# Patient Record
Sex: MALE | Race: BLACK OR AFRICAN AMERICAN | NOT HISPANIC OR LATINO | Employment: OTHER | URBAN - METROPOLITAN AREA
[De-identification: names, ages, dates, MRNs, and addresses within clinical notes are randomized per-mention and may not be internally consistent; named-entity substitution may affect disease eponyms.]

---

## 2017-12-01 ENCOUNTER — OFFICE VISIT (OUTPATIENT)
Dept: FAMILY MEDICINE | Facility: CLINIC | Age: 62
End: 2017-12-01
Payer: MEDICARE

## 2017-12-01 VITALS
SYSTOLIC BLOOD PRESSURE: 104 MMHG | WEIGHT: 315 LBS | HEIGHT: 77 IN | OXYGEN SATURATION: 95 % | HEART RATE: 57 BPM | BODY MASS INDEX: 37.19 KG/M2 | DIASTOLIC BLOOD PRESSURE: 70 MMHG | TEMPERATURE: 98 F

## 2017-12-01 DIAGNOSIS — E78.49 OTHER HYPERLIPIDEMIA: ICD-10-CM

## 2017-12-01 DIAGNOSIS — I10 ESSENTIAL HYPERTENSION: ICD-10-CM

## 2017-12-01 DIAGNOSIS — I50.42 CHRONIC COMBINED SYSTOLIC AND DIASTOLIC CONGESTIVE HEART FAILURE: ICD-10-CM

## 2017-12-01 PROCEDURE — 99999 PR PBB SHADOW E&M-NEW PATIENT-LVL III: CPT | Mod: PBBFAC,,, | Performed by: FAMILY MEDICINE

## 2017-12-01 PROCEDURE — 99204 OFFICE O/P NEW MOD 45 MIN: CPT | Mod: S$GLB,,, | Performed by: FAMILY MEDICINE

## 2017-12-01 RX ORDER — SPIRONOLACTONE 25 MG/1
25 TABLET ORAL DAILY
COMMUNITY
End: 2017-12-22 | Stop reason: SDUPTHER

## 2017-12-01 RX ORDER — CARVEDILOL 6.25 MG/1
6.25 TABLET ORAL 2 TIMES DAILY WITH MEALS
COMMUNITY
End: 2017-12-22 | Stop reason: SDUPTHER

## 2017-12-01 RX ORDER — PRAVASTATIN SODIUM 80 MG/1
80 TABLET ORAL DAILY
COMMUNITY
End: 2017-12-22 | Stop reason: SDUPTHER

## 2017-12-01 RX ORDER — ASPIRIN 325 MG
325 TABLET ORAL 2 TIMES DAILY
Status: ON HOLD | COMMUNITY
End: 2019-02-14 | Stop reason: HOSPADM

## 2017-12-01 RX ORDER — FUROSEMIDE 40 MG/1
40 TABLET ORAL DAILY
COMMUNITY
End: 2017-12-22 | Stop reason: SDUPTHER

## 2017-12-01 NOTE — PROGRESS NOTES
Chief Complaint   Patient presents with    Establish Care       HPI  Papito Bhakta is a 62 y.o. male with multiple medical diagnoses as listed in the medical history and problem list that presents for establishment of care . He was previously seen at [facility] but his insurance is not accepted there. He has a hx of hypertension, high cholesterol and heart failure. He would like to establish care with a cardiologist at [facility].     PAST MEDICAL HISTORY:  Past Medical History:   Diagnosis Date    CHF (congestive heart failure)     Hyperlipidemia        PAST SURGICAL HISTORY:  Past Surgical History:   Procedure Laterality Date    TESTICLE SURGERY         SOCIAL HISTORY:  Social History     Social History    Marital status:      Spouse name: N/A    Number of children: N/A    Years of education: N/A     Occupational History    Not on file.     Social History Main Topics    Smoking status: Former Smoker     Packs/day: 0.50     Years: 40.00     Types: Cigarettes, Cigars     Quit date: 2014    Smokeless tobacco: Never Used    Alcohol use Yes      Comment: once a month    Drug use: No    Sexual activity: Yes     Birth control/ protection: None      Comment: uses protection sometimes     Other Topics Concern    Not on file     Social History Narrative    No narrative on file       FAMILY HISTORY:  Family History   Problem Relation Age of Onset    Epilepsy Mother        ALLERGIES AND MEDICATIONS: updated and reviewed.  Review of patient's allergies indicates:  No Known Allergies  Current Outpatient Prescriptions   Medication Sig Dispense Refill    aspirin 325 MG tablet Take 325 mg by mouth once daily.      carvedilol (COREG) 6.25 MG tablet Take 6.25 mg by mouth 2 (two) times daily with meals.      furosemide (LASIX) 40 MG tablet Take 40 mg by mouth once daily. Take 1 1/2 tablets twice daily      pravastatin (PRAVACHOL) 80 MG tablet Take 80 mg by mouth once daily.      sacubitril-valsartan  "(ENTRESTO) 49-51 mg per tablet Take 1 tablet by mouth 2 (two) times daily. 180 tablet 3    spironolactone (ALDACTONE) 25 MG tablet Take 25 mg by mouth once daily.       No current facility-administered medications for this visit.        ROS  Review of Systems   Constitutional: Negative for chills, fatigue, fever and unexpected weight change.   HENT: Negative for ear pain, postnasal drip, rhinorrhea, sinus pressure and sore throat.    Eyes: Negative for photophobia and visual disturbance.   Respiratory: Negative for apnea, cough, chest tightness, shortness of breath and wheezing.    Cardiovascular: Negative for chest pain and palpitations.   Gastrointestinal: Negative for abdominal pain, blood in stool, constipation, diarrhea, nausea and vomiting.   Genitourinary: Negative for difficulty urinating.   Musculoskeletal: Negative for arthralgias and joint swelling.   Skin: Negative for rash.   Neurological: Negative for facial asymmetry, speech difficulty, weakness, numbness and headaches.   Psychiatric/Behavioral: Negative for dysphoric mood.       Physical Exam  Vitals:    12/01/17 0919   BP: 104/70   BP Location: Left arm   Patient Position: Sitting   BP Method: Large (Manual)   Pulse: (!) 57   Temp: 97.6 °F (36.4 °C)   TempSrc: Oral   SpO2: 95%   Weight: (!) 152.6 kg (336 lb 6.8 oz)   Height: 6' 5" (1.956 m)    Body mass index is 39.89 kg/m².  Weight: (!) 152.6 kg (336 lb 6.8 oz)   Height: 6' 5" (195.6 cm)     Physical Exam   Constitutional: He is oriented to person, place, and time. He appears well-developed and well-nourished.   HENT:   Head: Normocephalic and atraumatic.   Mouth/Throat: No oropharyngeal exudate.   Eyes: EOM are normal.   Cardiovascular: Normal rate, regular rhythm and normal heart sounds.  Exam reveals no gallop and no friction rub.    No murmur heard.  Pulmonary/Chest: Effort normal and breath sounds normal. No respiratory distress. He has no wheezes. He has no rales. He exhibits no tenderness. "   Lymphadenopathy:     He has no cervical adenopathy.   Neurological: He is alert and oriented to person, place, and time.   Skin: Skin is warm and dry.   Psychiatric: He has a normal mood and affect. His behavior is normal.   Nursing note and vitals reviewed.      Health Maintenance    Patient has no pending health maintenance at this time           ASSESSMENT     1. Other hyperlipidemia    2. Essential hypertension    3. Chronic combined systolic and diastolic congestive heart failure        PLAN:     Problem List Items Addressed This Visit        Cardiac/Vascular    Other hyperlipidemia  -This is a chronic medical condition that is stable under the current regimen. Continue to monitor and we will make medication adjustments as needed.       Essential hypertension  -This is a chronic medical condition that is stable under the current regimen. Continue to monitor and we will make medication adjustments as needed.       Relevant Orders    CBC auto differential (Completed)    Comprehensive metabolic panel (Completed)    Chronic combined systolic and diastolic congestive heart failure  -establish care with cardiology, check cholesterol and BNP    Relevant Orders    Ambulatory consult to Cardiology    Lipid panel (Completed)    Brain natriuretic peptide (Completed)            Brynn To MD  12/04/2017 9:44 AM        Return in about 3 months (around 3/1/2018) for Follow up.

## 2017-12-02 ENCOUNTER — LAB VISIT (OUTPATIENT)
Dept: LAB | Facility: HOSPITAL | Age: 62
End: 2017-12-02
Attending: FAMILY MEDICINE
Payer: MEDICARE

## 2017-12-02 DIAGNOSIS — I10 ESSENTIAL HYPERTENSION: ICD-10-CM

## 2017-12-02 DIAGNOSIS — I50.42 CHRONIC COMBINED SYSTOLIC AND DIASTOLIC CONGESTIVE HEART FAILURE: ICD-10-CM

## 2017-12-02 LAB
ALBUMIN SERPL BCP-MCNC: 3.6 G/DL
ALP SERPL-CCNC: 62 U/L
ALT SERPL W/O P-5'-P-CCNC: 11 U/L
ANION GAP SERPL CALC-SCNC: 10 MMOL/L
AST SERPL-CCNC: 17 U/L
BASOPHILS # BLD AUTO: 0.04 K/UL
BASOPHILS NFR BLD: 0.7 %
BILIRUB SERPL-MCNC: 1.1 MG/DL
BNP SERPL-MCNC: 151 PG/ML
BUN SERPL-MCNC: 17 MG/DL
CALCIUM SERPL-MCNC: 9.3 MG/DL
CHLORIDE SERPL-SCNC: 105 MMOL/L
CHOLEST SERPL-MCNC: 160 MG/DL
CHOLEST/HDLC SERPL: 3.5 {RATIO}
CO2 SERPL-SCNC: 27 MMOL/L
CREAT SERPL-MCNC: 1.4 MG/DL
DIFFERENTIAL METHOD: ABNORMAL
EOSINOPHIL # BLD AUTO: 0.2 K/UL
EOSINOPHIL NFR BLD: 4.4 %
ERYTHROCYTE [DISTWIDTH] IN BLOOD BY AUTOMATED COUNT: 14.4 %
EST. GFR  (AFRICAN AMERICAN): >60 ML/MIN/1.73 M^2
EST. GFR  (NON AFRICAN AMERICAN): 53.5 ML/MIN/1.73 M^2
GLUCOSE SERPL-MCNC: 105 MG/DL
HCT VFR BLD AUTO: 45.5 %
HDLC SERPL-MCNC: 46 MG/DL
HDLC SERPL: 28.8 %
HGB BLD-MCNC: 13.9 G/DL
IMM GRANULOCYTES # BLD AUTO: 0.01 K/UL
IMM GRANULOCYTES NFR BLD AUTO: 0.2 %
LDLC SERPL CALC-MCNC: 92.8 MG/DL
LYMPHOCYTES # BLD AUTO: 2.2 K/UL
LYMPHOCYTES NFR BLD: 40.7 %
MCH RBC QN AUTO: 25.1 PG
MCHC RBC AUTO-ENTMCNC: 30.5 G/DL
MCV RBC AUTO: 82 FL
MONOCYTES # BLD AUTO: 0.7 K/UL
MONOCYTES NFR BLD: 12.2 %
NEUTROPHILS # BLD AUTO: 2.3 K/UL
NEUTROPHILS NFR BLD: 41.8 %
NONHDLC SERPL-MCNC: 114 MG/DL
NRBC BLD-RTO: 0 /100 WBC
PLATELET # BLD AUTO: 183 K/UL
PMV BLD AUTO: 12.9 FL
POTASSIUM SERPL-SCNC: 4.3 MMOL/L
PROT SERPL-MCNC: 7.9 G/DL
RBC # BLD AUTO: 5.53 M/UL
SODIUM SERPL-SCNC: 142 MMOL/L
TRIGL SERPL-MCNC: 106 MG/DL
WBC # BLD AUTO: 5.48 K/UL

## 2017-12-02 PROCEDURE — 80061 LIPID PANEL: CPT

## 2017-12-02 PROCEDURE — 36415 COLL VENOUS BLD VENIPUNCTURE: CPT | Mod: PO

## 2017-12-02 PROCEDURE — 80053 COMPREHEN METABOLIC PANEL: CPT

## 2017-12-02 PROCEDURE — 83880 ASSAY OF NATRIURETIC PEPTIDE: CPT

## 2017-12-02 PROCEDURE — 85025 COMPLETE CBC W/AUTO DIFF WBC: CPT

## 2017-12-04 ENCOUNTER — TELEPHONE (OUTPATIENT)
Dept: ADMINISTRATIVE | Facility: HOSPITAL | Age: 62
End: 2017-12-04

## 2017-12-05 ENCOUNTER — TELEPHONE (OUTPATIENT)
Dept: FAMILY MEDICINE | Facility: CLINIC | Age: 62
End: 2017-12-05

## 2017-12-05 NOTE — TELEPHONE ENCOUNTER
Please let him know his blood work shows one of his liver labs is elevated, but mildly, we can recheck this at his next visit.His heart failure lab is elevated mildly but as long as he feels well, this is ok.     Brynn To MD

## 2017-12-08 DIAGNOSIS — Z12.11 COLON CANCER SCREENING: ICD-10-CM

## 2017-12-13 ENCOUNTER — OFFICE VISIT (OUTPATIENT)
Dept: CARDIOLOGY | Facility: CLINIC | Age: 62
End: 2017-12-13
Payer: MEDICARE

## 2017-12-13 VITALS
OXYGEN SATURATION: 100 % | WEIGHT: 315 LBS | BODY MASS INDEX: 39.95 KG/M2 | HEART RATE: 78 BPM | DIASTOLIC BLOOD PRESSURE: 84 MMHG | SYSTOLIC BLOOD PRESSURE: 134 MMHG

## 2017-12-13 DIAGNOSIS — I50.42 CHRONIC COMBINED SYSTOLIC AND DIASTOLIC CONGESTIVE HEART FAILURE: Primary | ICD-10-CM

## 2017-12-13 DIAGNOSIS — I10 ESSENTIAL HYPERTENSION: ICD-10-CM

## 2017-12-13 DIAGNOSIS — R06.02 SOB (SHORTNESS OF BREATH): ICD-10-CM

## 2017-12-13 DIAGNOSIS — Z72.0 TOBACCO ABUSE: ICD-10-CM

## 2017-12-13 DIAGNOSIS — E78.49 OTHER HYPERLIPIDEMIA: ICD-10-CM

## 2017-12-13 PROCEDURE — 99999 PR PBB SHADOW E&M-EST. PATIENT-LVL III: CPT | Mod: PBBFAC,,, | Performed by: INTERNAL MEDICINE

## 2017-12-13 PROCEDURE — 99204 OFFICE O/P NEW MOD 45 MIN: CPT | Mod: S$GLB,,, | Performed by: INTERNAL MEDICINE

## 2017-12-13 PROCEDURE — 93000 ELECTROCARDIOGRAM COMPLETE: CPT | Mod: S$GLB,,, | Performed by: INTERNAL MEDICINE

## 2017-12-13 NOTE — PROGRESS NOTES
Subjective:    Patient ID:  Papito Bhakta is a 62 y.o. male who presents for evaluation of Consult (transfer from [facility] )      HPI  Patient is here to establish care for history of systolic heart failure.  He was previously followed by the heart clinic and from his recollection has history of nonischemic cardiomyopathy.  He says after questioning that he possibly underwent heart catheterization showing no blockage.  He was wearing a LifeVest at 1. and then was told he didn't have to wear it anymore?  He somewhat of a poor historian cannot recall the details specifically.  He currently denies any chest pain, shortness of breath or palpitations.  He's express no PND, orthopnea and has stable lower extremity edema usually right greater than left due to inappropriate drainage after a bee sting remotely.  He's not expressing dizziness, presyncope or syncope.    Review of Systems   Constitution: Negative.   HENT: Negative.    Eyes: Negative.    Cardiovascular: Positive for leg swelling. Negative for chest pain, dyspnea on exertion, irregular heartbeat, near-syncope, orthopnea, palpitations, paroxysmal nocturnal dyspnea and syncope.   Respiratory: Negative for shortness of breath.    Skin: Negative.    Musculoskeletal: Negative.    Gastrointestinal: Negative for abdominal pain, constipation and diarrhea.   Genitourinary: Negative for dysuria.   Neurological: Negative for dizziness.   Psychiatric/Behavioral: Negative.      Past Medical History:   Diagnosis Date    CHF (congestive heart failure)     Hyperlipidemia      Past Surgical History:   Procedure Laterality Date    TESTICLE SURGERY       Social History   Substance Use Topics    Smoking status: Former Smoker     Packs/day: 0.50     Years: 40.00     Types: Cigarettes, Cigars     Quit date: 2014    Smokeless tobacco: Never Used    Alcohol use Yes      Comment: once a month     Family History   Problem Relation Age of Onset    Epilepsy Mother         Objective:     Physical Exam   Constitutional: He is oriented to person, place, and time. He appears well-developed and well-nourished. No distress.   HENT:   Head: Normocephalic and atraumatic.   Eyes: Conjunctivae and EOM are normal. Pupils are equal, round, and reactive to light.   Neck: Normal range of motion. Neck supple. No thyromegaly present.   Cardiovascular: Normal rate, regular rhythm and normal heart sounds.    No murmur heard.  Pulmonary/Chest: Effort normal and breath sounds normal. No respiratory distress. He has no wheezes. He has no rales. He exhibits no tenderness.   Abdominal: Soft. Bowel sounds are normal.   Musculoskeletal: He exhibits edema.   Neurological: He is alert and oriented to person, place, and time.   Skin: Skin is warm and dry.   Psychiatric: He has a normal mood and affect. His behavior is normal.       ekg sinus rhythm with IVCD    Assessment:       1. Chronic combined systolic and diastolic congestive heart failure    2. Essential hypertension    3. Other hyperlipidemia    4. Tobacco abuse         Plan:       -Currently stable without any signs of central congestion, EF normalized?  -Get records from the heart clinic  -Counseled on sodium restriction and diet/exercise  -Likely will need sleep study referral at next visit after reviewing records    Return to clinic in one month

## 2017-12-13 NOTE — LETTER
December 13, 2017      Brynn To MD  [address]  [place] [address]           [facility]  [address] [facility]  [place] [address]  Phone: [phone]          Patient: Papito Bhakta   MR Number: [id number]   YOB: 1955   Date of Visit: 12/13/2017       Dear Dr. Brynn To:    Thank you for referring Papito Bhakta to me for evaluation. Attached you will find relevant portions of my assessment and plan of care.    If you have questions, please do not hesitate to call me. I look forward to following Papito Bhakta along with you.    Sincerely,    Shailesh Eng MD    Enclosure  CC:  No Recipients    If you would like to receive this communication electronically, please contact [email or web address] or [phone] to request more information on [facility] access.    For providers and/or their staff who would like to refer a patient to [facility], please contact us through our one-stop-shop provider referral line, [facility] , at [phone].    If you feel you have received this communication in error or would no longer like to receive these types of communications, please e-mail [email or web address]

## 2017-12-21 ENCOUNTER — TELEPHONE (OUTPATIENT)
Dept: CARDIOLOGY | Facility: CLINIC | Age: 62
End: 2017-12-21

## 2017-12-21 NOTE — TELEPHONE ENCOUNTER
----- Message from Ely De Luna sent at 12/21/2017 11:39 AM CST -----  Contact: self  [phone]  Py is requesting to speak to you regarding refills on all meds.Pt said that during his last OV he was told that all meds were going to be called into the pharamcy. Pls call pt [phone]. Thanks...........Viviana

## 2017-12-22 ENCOUNTER — TELEPHONE (OUTPATIENT)
Dept: FAMILY MEDICINE | Facility: CLINIC | Age: 62
End: 2017-12-22

## 2017-12-22 NOTE — TELEPHONE ENCOUNTER
----- Message from Talia Monteiro sent at 12/22/2017  3:24 PM CST -----  Pt ask that Dr. To to please him about getting refills on his meds ASAP pt states that he is out of his meds. [phone]

## 2017-12-22 NOTE — TELEPHONE ENCOUNTER
Patient requesting a refill of all of his medications, medications reviewed with patient.  He is requesting a 30 day supply until he sees Dr. Eng again on 1/10/2018.  Patient states that he is taking 1 1/2 tablets of furosemide daily per the advice of his previous physician.  Adjustment made to furosemide order.  Orders forwarded for review.

## 2017-12-23 RX ORDER — FUROSEMIDE 40 MG/1
60 TABLET ORAL 2 TIMES DAILY
Qty: 45 TABLET | Refills: 0 | Status: SHIPPED | OUTPATIENT
Start: 2017-12-23 | End: 2018-01-10 | Stop reason: SDUPTHER

## 2017-12-23 RX ORDER — SPIRONOLACTONE 25 MG/1
25 TABLET ORAL DAILY
Qty: 30 TABLET | Refills: 0 | Status: SHIPPED | OUTPATIENT
Start: 2017-12-23 | End: 2018-01-10 | Stop reason: SDUPTHER

## 2017-12-23 RX ORDER — CARVEDILOL 6.25 MG/1
6.25 TABLET ORAL 2 TIMES DAILY WITH MEALS
Qty: 60 TABLET | Refills: 0 | Status: SHIPPED | OUTPATIENT
Start: 2017-12-23 | End: 2018-01-10 | Stop reason: SDUPTHER

## 2017-12-23 RX ORDER — PRAVASTATIN SODIUM 80 MG/1
80 TABLET ORAL DAILY
Qty: 30 TABLET | Refills: 0 | Status: SHIPPED | OUTPATIENT
Start: 2017-12-23 | End: 2018-01-10 | Stop reason: SDUPTHER

## 2017-12-26 NOTE — TELEPHONE ENCOUNTER
Voicemail message left for patient notifying him that his refill requests were approved and to notify this office of any additional concerns.

## 2018-01-10 ENCOUNTER — OFFICE VISIT (OUTPATIENT)
Dept: CARDIOLOGY | Facility: CLINIC | Age: 63
End: 2018-01-10
Payer: MEDICARE

## 2018-01-10 VITALS
DIASTOLIC BLOOD PRESSURE: 82 MMHG | HEART RATE: 67 BPM | OXYGEN SATURATION: 97 % | SYSTOLIC BLOOD PRESSURE: 140 MMHG | WEIGHT: 315 LBS | BODY MASS INDEX: 39.74 KG/M2

## 2018-01-10 DIAGNOSIS — R06.02 SOB (SHORTNESS OF BREATH): ICD-10-CM

## 2018-01-10 DIAGNOSIS — I50.42 CHRONIC COMBINED SYSTOLIC AND DIASTOLIC CONGESTIVE HEART FAILURE: Primary | ICD-10-CM

## 2018-01-10 DIAGNOSIS — E66.01 CLASS 2 SEVERE OBESITY DUE TO EXCESS CALORIES WITH SERIOUS COMORBIDITY AND BODY MASS INDEX (BMI) OF 37.0 TO 37.9 IN ADULT: ICD-10-CM

## 2018-01-10 DIAGNOSIS — Z72.0 TOBACCO ABUSE: ICD-10-CM

## 2018-01-10 DIAGNOSIS — E78.49 OTHER HYPERLIPIDEMIA: ICD-10-CM

## 2018-01-10 DIAGNOSIS — I10 ESSENTIAL HYPERTENSION: ICD-10-CM

## 2018-01-10 PROCEDURE — 99999 PR PBB SHADOW E&M-EST. PATIENT-LVL III: CPT | Mod: PBBFAC,,, | Performed by: INTERNAL MEDICINE

## 2018-01-10 PROCEDURE — 99214 OFFICE O/P EST MOD 30 MIN: CPT | Mod: S$GLB,,, | Performed by: INTERNAL MEDICINE

## 2018-01-10 RX ORDER — PRAVASTATIN SODIUM 80 MG/1
80 TABLET ORAL DAILY
Qty: 90 TABLET | Refills: 3 | Status: SHIPPED | OUTPATIENT
Start: 2018-01-10 | End: 2019-02-21 | Stop reason: SDUPTHER

## 2018-01-10 RX ORDER — CARVEDILOL 6.25 MG/1
6.25 TABLET ORAL 2 TIMES DAILY WITH MEALS
Qty: 180 TABLET | Refills: 3 | Status: ON HOLD | OUTPATIENT
Start: 2018-01-10 | End: 2019-01-13 | Stop reason: HOSPADM

## 2018-01-10 RX ORDER — FUROSEMIDE 40 MG/1
60 TABLET ORAL 2 TIMES DAILY
Qty: 180 TABLET | Refills: 3 | Status: SHIPPED | OUTPATIENT
Start: 2018-01-10 | End: 2018-08-03 | Stop reason: SDUPTHER

## 2018-01-10 RX ORDER — SPIRONOLACTONE 25 MG/1
25 TABLET ORAL DAILY
Qty: 90 TABLET | Refills: 3 | Status: SHIPPED | OUTPATIENT
Start: 2018-01-10 | End: 2019-02-28 | Stop reason: SDUPTHER

## 2018-01-10 NOTE — PROGRESS NOTES
Subjective:    Patient ID:  Papito Bhakta is a 62 y.o. male who presents for follow-up of No chief complaint on file.      HPI  Patient is here for follow-up of systolic heart failure.  He was previously followed by the heart clinic.  He did wear LifeVest for a period and was discontinued from this because his EF improved?  We did not receive records yet from the heart clinic regarding his LV function.  We discussed getting a echo today.  He denies any current worsening cardiopulmonary complaints.  He's good with his medicines but noncompliant with his diet.  He denies any PND, orthopnea or lower edema.  He's not expressing dizziness, presyncope or syncope.    Review of Systems   Constitution: Negative.   HENT: Negative.    Eyes: Negative.    Cardiovascular: Negative for chest pain, dyspnea on exertion, irregular heartbeat, leg swelling, near-syncope, orthopnea, palpitations, paroxysmal nocturnal dyspnea and syncope.   Respiratory: Negative for shortness of breath.    Skin: Negative.    Musculoskeletal: Negative.    Gastrointestinal: Negative for abdominal pain, constipation and diarrhea.   Genitourinary: Negative for dysuria.   Neurological: Negative for dizziness.   Psychiatric/Behavioral: Negative.         Objective:    Physical Exam   Constitutional: He is oriented to person, place, and time. He appears well-developed and well-nourished. No distress.   HENT:   Head: Normocephalic and atraumatic.   Eyes: Conjunctivae and EOM are normal. Pupils are equal, round, and reactive to light.   Neck: Normal range of motion. Neck supple. No thyromegaly present.   Cardiovascular: Normal rate, regular rhythm and normal heart sounds.    No murmur heard.  Pulmonary/Chest: Effort normal and breath sounds normal. No respiratory distress. He has no wheezes. He has no rales. He exhibits no tenderness.   Abdominal: Soft. Bowel sounds are normal.   Musculoskeletal: He exhibits no edema.   Neurological: He is alert and oriented to  person, place, and time.   Skin: Skin is warm and dry.   Psychiatric: He has a normal mood and affect. His behavior is normal.         Assessment:       1. Chronic combined systolic and diastolic congestive heart failure    2. Essential hypertension    3. Other hyperlipidemia    4. Tobacco abuse    5. SOB (shortness of breath)    6. Class 2 severe obesity due to excess calories with serious comorbidity and body mass index (BMI) of 37.0 to 37.9 in adult         Plan:       -Currently stable without any signs of central congestion, EF normalized?  -Check echocardiogram  -Get records from the heart clinic  -Counseled on sodium restriction and diet/exercise  -Likely will need sleep study referral  -Counseled on diet and exercise    Return to clinic in 3 month

## 2018-01-24 ENCOUNTER — HOSPITAL ENCOUNTER (OUTPATIENT)
Dept: CARDIOLOGY | Facility: HOSPITAL | Age: 63
Discharge: HOME | End: 2018-01-24
Attending: INTERNAL MEDICINE
Payer: MEDICARE

## 2018-01-24 DIAGNOSIS — I50.42 CHRONIC COMBINED SYSTOLIC AND DIASTOLIC CONGESTIVE HEART FAILURE: ICD-10-CM

## 2018-01-24 LAB
DIASTOLIC DYSFUNCTION: YES
ESTIMATED PA SYSTOLIC PRESSURE: 26.81
GLOBAL PERICARDIAL EFFUSION: ABNORMAL
MITRAL VALVE REGURGITATION: ABNORMAL
RETIRED EF AND QEF - SEE NOTES: 15 (ref 55–65)

## 2018-01-24 PROCEDURE — 93306 TTE W/DOPPLER COMPLETE: CPT

## 2018-01-24 PROCEDURE — 93306 TTE W/DOPPLER COMPLETE: CPT | Mod: 26,,, | Performed by: INTERNAL MEDICINE

## 2018-01-26 ENCOUNTER — OFFICE VISIT (OUTPATIENT)
Dept: CARDIOLOGY | Facility: CLINIC | Age: 63
End: 2018-01-26
Payer: MEDICARE

## 2018-01-26 VITALS
WEIGHT: 315 LBS | OXYGEN SATURATION: 97 % | HEART RATE: 61 BPM | BODY MASS INDEX: 40.52 KG/M2 | SYSTOLIC BLOOD PRESSURE: 133 MMHG | DIASTOLIC BLOOD PRESSURE: 67 MMHG

## 2018-01-26 DIAGNOSIS — I10 ESSENTIAL HYPERTENSION: ICD-10-CM

## 2018-01-26 DIAGNOSIS — I50.42 CHRONIC COMBINED SYSTOLIC AND DIASTOLIC CONGESTIVE HEART FAILURE: Primary | ICD-10-CM

## 2018-01-26 DIAGNOSIS — Z72.0 TOBACCO ABUSE: ICD-10-CM

## 2018-01-26 DIAGNOSIS — E78.49 OTHER HYPERLIPIDEMIA: ICD-10-CM

## 2018-01-26 PROCEDURE — 99999 PR PBB SHADOW E&M-EST. PATIENT-LVL III: CPT | Mod: PBBFAC,,, | Performed by: INTERNAL MEDICINE

## 2018-01-26 PROCEDURE — 99214 OFFICE O/P EST MOD 30 MIN: CPT | Mod: S$GLB,,, | Performed by: INTERNAL MEDICINE

## 2018-01-26 NOTE — PROGRESS NOTES
Subjective:    Patient ID:  Papito Villatoro is a 62 y.o. male who presents for follow-up of No chief complaint on file.      HPI   previous history:  Patient is here for follow-up of systolic heart failure.  He was previously followed by the heart clinic.  He did wear LifeVest for a period and was discontinued from this because his EF improved?  We did not receive records yet from the heart clinic regarding his LV function.  We discussed getting a echo today.  He denies any current worsening cardiopulmonary complaints.  He's good with his medicines but noncompliant with his diet.  He denies any PND, orthopnea or lower edema.  He's not expressing dizziness, presyncope or syncope.    Today:  Here for follow-up of systolic heart failure.  A repeat echocardiogram that shows still depressed LV function.  He denies any current chest pain, shortness of breath but does get swelling and is noncompliant with sodium intake.  He denies any PND or orthopnea.  He's express no dizziness, presyncope or syncope.  He's agreeable to cardiac catheterization.      Review of Systems   Constitution: Negative.   HENT: Negative.    Eyes: Negative.    Cardiovascular: Positive for dyspnea on exertion and leg swelling. Negative for chest pain, irregular heartbeat, near-syncope, orthopnea, palpitations, paroxysmal nocturnal dyspnea and syncope.   Respiratory: Positive for shortness of breath.    Skin: Negative.    Musculoskeletal: Negative.    Gastrointestinal: Negative for abdominal pain, constipation and diarrhea.   Genitourinary: Negative for dysuria.   Neurological: Negative for dizziness.   Psychiatric/Behavioral: Negative.         Objective:    Physical Exam   Constitutional: He is oriented to person, place, and time. He appears well-developed and well-nourished. No distress.   HENT:   Head: Normocephalic and atraumatic.   Eyes: Conjunctivae and EOM are normal. Pupils are equal, round, and reactive to light.   Neck: Normal range of  motion. Neck supple. No thyromegaly present.   Cardiovascular: Normal rate, regular rhythm and normal heart sounds.    No murmur heard.  Pulmonary/Chest: Effort normal and breath sounds normal. No respiratory distress. He has no wheezes. He has no rales. He exhibits no tenderness.   Abdominal: Soft. Bowel sounds are normal.   Musculoskeletal: He exhibits no edema.   Neurological: He is alert and oriented to person, place, and time.   Skin: Skin is warm and dry.   Psychiatric: He has a normal mood and affect. His behavior is normal.       echo:  CONCLUSIONS     1 - Severely depressed left ventricular systolic function (EF 15-20%).     2 - Eccentric hypertrophy.     3 - Severe left ventricular enlargement.     4 - Biatrial enlargement.     5 - Restrictive LV filling pattern, indicating markedly elevated LAP (grade 3 diastolic dysfunction).     6 - Right ventricular enlargement with normal systolic function.     7 - Mild mitral regurgitation.     8 - Mild pulmonic regurgitation.     9 - Trivial pericardial effusion.     Assessment:       1. Chronic combined systolic and diastolic congestive heart failure    2. Essential hypertension    3. Other hyperlipidemia    4. Tobacco abuse         Plan:       -Currently stable without any signs of central congestion, continue med therapy  -Awaiting records from the heart clinic  -Counseled on sodium restriction and diet/exercise  -Likely will need sleep study referral  -Counseled on diet and exercise    Return to clinic in 3 month

## 2018-01-30 ENCOUNTER — HOSPITAL ENCOUNTER (OUTPATIENT)
Dept: PREADMISSION TESTING | Facility: HOSPITAL | Age: 63
Discharge: HOME | End: 2018-01-30
Attending: INTERNAL MEDICINE
Payer: MEDICARE

## 2018-01-30 VITALS
HEIGHT: 77 IN | TEMPERATURE: 98 F | WEIGHT: 315 LBS | HEART RATE: 63 BPM | RESPIRATION RATE: 18 BRPM | SYSTOLIC BLOOD PRESSURE: 110 MMHG | BODY MASS INDEX: 37.19 KG/M2 | OXYGEN SATURATION: 97 % | DIASTOLIC BLOOD PRESSURE: 63 MMHG

## 2018-01-30 DIAGNOSIS — I50.42 CHRONIC COMBINED SYSTOLIC AND DIASTOLIC CONGESTIVE HEART FAILURE: ICD-10-CM

## 2018-01-30 LAB
ANION GAP SERPL CALC-SCNC: 7 MMOL/L
BASOPHILS # BLD AUTO: 0.04 K/UL
BASOPHILS NFR BLD: 0.8 %
BUN SERPL-MCNC: 15 MG/DL
CALCIUM SERPL-MCNC: 9.7 MG/DL
CHLORIDE SERPL-SCNC: 105 MMOL/L
CO2 SERPL-SCNC: 30 MMOL/L
CREAT SERPL-MCNC: 1.4 MG/DL
DIFFERENTIAL METHOD: ABNORMAL
EOSINOPHIL # BLD AUTO: 0.2 K/UL
EOSINOPHIL NFR BLD: 4.6 %
ERYTHROCYTE [DISTWIDTH] IN BLOOD BY AUTOMATED COUNT: 14.8 %
EST. GFR  (AFRICAN AMERICAN): >60 ML/MIN/1.73 M^2
EST. GFR  (NON AFRICAN AMERICAN): 53 ML/MIN/1.73 M^2
GLUCOSE SERPL-MCNC: 98 MG/DL
HCT VFR BLD AUTO: 43.2 %
HGB BLD-MCNC: 13.7 G/DL
INR PPP: 1
LYMPHOCYTES # BLD AUTO: 2.3 K/UL
LYMPHOCYTES NFR BLD: 46 %
MCH RBC QN AUTO: 25.1 PG
MCHC RBC AUTO-ENTMCNC: 31.7 G/DL
MCV RBC AUTO: 79 FL
MONOCYTES # BLD AUTO: 0.7 K/UL
MONOCYTES NFR BLD: 14.3 %
NEUTROPHILS # BLD AUTO: 1.7 K/UL
NEUTROPHILS NFR BLD: 34.3 %
PLATELET # BLD AUTO: 180 K/UL
PMV BLD AUTO: 12 FL
POTASSIUM SERPL-SCNC: 3.9 MMOL/L
PROTHROMBIN TIME: 10.7 SEC
RBC # BLD AUTO: 5.45 M/UL
SODIUM SERPL-SCNC: 142 MMOL/L
WBC # BLD AUTO: 5.02 K/UL

## 2018-01-30 PROCEDURE — 85025 COMPLETE CBC W/AUTO DIFF WBC: CPT

## 2018-01-30 PROCEDURE — 85610 PROTHROMBIN TIME: CPT

## 2018-01-30 PROCEDURE — 80048 BASIC METABOLIC PNL TOTAL CA: CPT

## 2018-01-30 PROCEDURE — 36415 COLL VENOUS BLD VENIPUNCTURE: CPT

## 2018-01-30 NOTE — DISCHARGE INSTRUCTIONS
Your angiogram is scheduled for___2/1/2018____________    Call [phone] between 2 pm and 5 pm on __1/31/2018__________to find out your arrival time for the day of your procedure.    You will go directly to Same Day Surgery on the 2nd floor of the hospital on the day of your procedure at __________    If you need wheelchair assistance, call [phone] from the lobby using your cell phone.  Or you may use the courtesy phone in the lobby.  The  will direct your call to the Same Day Surgery unit.    IMPORTANT INSTRUCTIONS:  Do not eat or drink anything after midnight.  This includes water and coffee.  It is okay to brush your teeth.  Do not chew gum or have hard candy or mints.    Take your morning medications with small sips of water.        Wash both groins with Hibiclens on the night before your angiogram, and on the morning of your angiogram.  If your doctor has told you that the wrist area will be used for the procedure, wash both wrists.  Be sure to rinse it off.  Do not put Hibiclens directly on your genitals or face.     Do not wear make-up.     You may wear deodorant, but do not wear body lotion, powder or cologne       Do not wear jewelry.     You do not need money or valuables.  You may bring your cell phone.     If you are going home the same day, you must arrange for someone to drive you home.     Wear comfortable, loose fitting clothes.     Do not take any diabetic medication.  Metformin should be held for 2 days before the angiogram.     Call your doctor if you have sickness or fever before your angiogram.     Our number in [facility] is [phone] if you need to contact us.

## 2018-01-31 NOTE — H&P
History of present illness:  Here for follow-up of systolic heart failure.  A repeat echocardiogram that shows still depressed LV function.  He denies any current chest pain, shortness of breath but does get swelling and is noncompliant with sodium intake.  He denies any PND or orthopnea.  He's express no dizziness, presyncope or syncope.  He's agreeable to cardiac catheterization.        Review of Systems   Constitution: Negative.   HENT: Negative.    Eyes: Negative.    Cardiovascular: Positive for dyspnea on exertion and leg swelling. Negative for chest pain, irregular heartbeat, near-syncope, orthopnea, palpitations, paroxysmal nocturnal dyspnea and syncope.   Respiratory: Positive for shortness of breath.    Skin: Negative.    Musculoskeletal: Negative.    Gastrointestinal: Negative for abdominal pain, constipation and diarrhea.   Genitourinary: Negative for dysuria.   Neurological: Negative for dizziness.   Psychiatric/Behavioral: Negative.       Objective:    Physical Exam   Constitutional: He is oriented to person, place, and time. He appears well-developed and well-nourished. No distress.   HENT:   Head: Normocephalic and atraumatic.   Eyes: Conjunctivae and EOM are normal. Pupils are equal, round, and reactive to light.   Neck: Normal range of motion. Neck supple. No thyromegaly present.   Cardiovascular: Normal rate, regular rhythm and normal heart sounds.    No murmur heard.  Pulmonary/Chest: Effort normal and breath sounds normal. No respiratory distress. He has no wheezes. He has no rales. He exhibits no tenderness.   Abdominal: Soft. Bowel sounds are normal.   Musculoskeletal: He exhibits no edema.   Neurological: He is alert and oriented to person, place, and time.   Skin: Skin is warm and dry.   Psychiatric: He has a normal mood and affect. His behavior is normal.        echo:  CONCLUSIONS     1 - Severely depressed left ventricular systolic function (EF 15-20%).     2 - Eccentric hypertrophy.     3  - Severe left ventricular enlargement.     4 - Biatrial enlargement.     5 - Restrictive LV filling pattern, indicating markedly elevated LAP (grade 3 diastolic dysfunction).     6 - Right ventricular enlargement with normal systolic function.     7 - Mild mitral regurgitation.     8 - Mild pulmonic regurgitation.     9 - Trivial pericardial effusion.      Assessment:       1. Chronic combined systolic and diastolic congestive heart failure    2. Essential hypertension    3. Other hyperlipidemia    4. Tobacco abuse       Plan:       -Currently stable without any signs of central congestion, continue med therapy  -Plan for right and left heart catheterization to evaluate etiology and filling pressures  -Awaiting records from the heart clinic  -Counseled on sodium restriction and diet/exercise  -Likely will need sleep study referral  -Counseled on diet and exercise     Return to clinic after the procedure    **Risks/benefits of the procedure were d/w the patient including bleeding, infection, death, mi, arrhythmia, kidney failure, stroke, etc.  Patient understands and consent was placed on the chart.

## 2018-02-01 ENCOUNTER — HOSPITAL ENCOUNTER (OUTPATIENT)
Facility: HOSPITAL | Age: 63
Discharge: HOME | End: 2018-02-01
Attending: INTERNAL MEDICINE | Admitting: INTERNAL MEDICINE
Payer: MEDICARE

## 2018-02-01 ENCOUNTER — SURGERY (OUTPATIENT)
Age: 63
End: 2018-02-01

## 2018-02-01 VITALS
TEMPERATURE: 97 F | DIASTOLIC BLOOD PRESSURE: 74 MMHG | RESPIRATION RATE: 18 BRPM | WEIGHT: 315 LBS | HEART RATE: 57 BPM | HEIGHT: 77 IN | OXYGEN SATURATION: 100 % | BODY MASS INDEX: 37.19 KG/M2 | SYSTOLIC BLOOD PRESSURE: 119 MMHG

## 2018-02-01 DIAGNOSIS — I50.42 CHRONIC COMBINED SYSTOLIC AND DIASTOLIC CONGESTIVE HEART FAILURE: ICD-10-CM

## 2018-02-01 DIAGNOSIS — E78.49 OTHER HYPERLIPIDEMIA: ICD-10-CM

## 2018-02-01 PROCEDURE — 93460 R&L HRT ART/VENTRICLE ANGIO: CPT | Mod: 26,,, | Performed by: INTERNAL MEDICINE

## 2018-02-01 PROCEDURE — 25000003 PHARM REV CODE 250

## 2018-02-01 PROCEDURE — 63600175 PHARM REV CODE 636 W HCPCS

## 2018-02-01 PROCEDURE — 99152 MOD SED SAME PHYS/QHP 5/>YRS: CPT | Mod: ,,, | Performed by: INTERNAL MEDICINE

## 2018-02-01 PROCEDURE — 25000003 PHARM REV CODE 250: Performed by: INTERNAL MEDICINE

## 2018-02-01 PROCEDURE — A4216 STERILE WATER/SALINE, 10 ML: HCPCS

## 2018-02-01 PROCEDURE — 99152 MOD SED SAME PHYS/QHP 5/>YRS: CPT

## 2018-02-01 PROCEDURE — 99900035 HC TECH TIME PER 15 MIN (STAT)

## 2018-02-01 RX ORDER — SODIUM CHLORIDE 9 MG/ML
INJECTION, SOLUTION INTRAVENOUS CONTINUOUS
Status: DISCONTINUED | OUTPATIENT
Start: 2018-02-01 | End: 2018-02-01 | Stop reason: HOSPADM

## 2018-02-01 RX ADMIN — SODIUM CHLORIDE: 0.9 INJECTION, SOLUTION INTRAVENOUS at 06:02

## 2018-02-01 NOTE — DISCHARGE INSTRUCTIONS
DIET: You may resume your home diet. If nausea is present, increase your diet gradually with fluids and bland foods    ACTIVITY LEVEL: You have received sedation or an anesthetic, you may feel sleepy for several hours. Rest until you are more awake. Gradually resume your normal activities    Medications: Pain medication should be taken only if needed and as directed. If antibiotics are prescribed, the medication should be taken until completed. You will be given an updated list of you medications.    No driving, alcoholic beverages or signing legal documents for next 24 hours or while taking pain medication.       CALL THE DOCTOR:    For any obvious bleeding (some dried blood over the incision is normal).      Redness, swelling, foul smell around incision or fever over 101.   Shortness of breath, Coughing up Bloody sputum, Pains or Swelling in your Calves .   Persistent pain or nausea not relieved by medication.    If any unusual problems or difficulties occur contact your doctor. If you cannot contact your doctor but feel your signs and symptoms warrant a physicians attention return to the emergency room.   Vascular Closure Device     -Re apply a clean, dry band aid and every day for five days or until a scab has formed at the site.  Change the band aid as needed  -Keep the site dry and clean.  -You may shower 24 hours after the procedure, but do not bathe or use a pool until the wound had completely closed.  -Gently clean your puncture site with soap and warm water.  -After showering , gently pat-dry the site with a clean towel; then let the site air dry before covering with a band aid  -Limit tight fitting clothes or underwear that my irritate the puncture site until the site has healed.    Daily Activities    Do not drive, drink alcohol, or sign legal documents for 24 hours, or if taking narcotic pain medication.    Day of discharge  -No driving  Modify activity for 3-5 days  -No heavy lifting of anything  over 5 pounds  (equivalent to a 1/2 gallon of milk)  -No pushing or pulling.  -No vigorous activity or straining  -Avoid stairs unless necessary : if necessary, take them slowly.  -Coughing, sneezing, or straining for a bowel movement:  Support your groin by pressing with your palm on top of the dressing/bandage.  -Sexual activity : check with your doctor  -No strenuous exercise.  -Avoid driving unless necessary.  Talk to your doctor about returning back to work, which depends on your type of work., your procedure and any medication you might be taking.    Fall Prevention  Millions of people fall every year and injure themselves. You may have had anesthesia or sedation which may increase your risk of falling. You may have health issues that put you at an increased risk of falling.     Here are ways to reduce your risk of falling.  ·   · Make your home safe by keeping walkways clear of objects you may trip over.  · Use non-slip pads under rugs. Do not use area rugs or small throw rugs.  · Use non-slip mats in bathtubs and showers.  · Install handrails and lights on staircases.  · Do not walk in poorly lit areas.  · Do not stand on chairs or wobbly ladders.  · Use caution when reaching overhead or looking upward. This position can cause a loss of balance.  · Be sure your shoes fit properly, have non-slip bottoms and are in good condition.   · Wear shoes both inside and out. Avoid going barefoot or wearing slippers.  · Be cautious when going up and down stairs, curbs, and when walking on uneven sidewalks.  · If your balance is poor, consider using a cane or walker.  · If your fall was related to alcohol use, stop or limit alcohol intake.   · If your fall was related to use of sleeping medicines, talk to your doctor about this. You may need to reduce your dosage at bedtime if you awaken during the night to go to the bathroom.    · To reduce the need for nighttime bathroom trips:  ¨ Avoid drinking fluids for several  hours before going to bed  ¨ Empty your bladder before going to bed  ¨ Men can keep a urinal at the bedside  · Stay as active as you can. Balance, flexibility, strength, and endurance all come from exercise. They all play a role in preventing falls. Ask your healthcare provider which types of activity are right for you.  · Get your vision checked on a regular basis.  · If you have pets, know where they are before you stand up or walk so you don't trip over them.  · Use night lights.

## 2018-02-01 NOTE — BRIEF OP NOTE
[facility]  Brief Operative Note     SUMMARY     Surgery Date: 2/1/2018     Surgeon(s) and Role:     * Shailesh Eng MD - Primary    Assisting Surgeon: None    Pre-op Diagnosis:  Chronic combined systolic and diastolic congestive heart failure [I50.42]    Post-op Diagnosis:  Post-Op Diagnosis Codes:     * Chronic combined systolic and diastolic congestive heart failure [I50.42]    Procedure(s) (LRB):  Heart Cath-Bilateral (Bilateral)    Anesthesia: RN IV Sedation    Description of the findings of the procedure: Right and left heart catheterization    Findings/Key Components: No significant coronary artery disease with normal filling pressures and mild pulmonary hypertension.    Recommendation:  -Continue med therapy  -Will need biventricular ICD placement    Estimated Blood Loss: Less than 50 cc         Specimens:   Specimen (12h ago through future)    None          Discharge Note    SUMMARY     Admit Date: 2/1/2018    Discharge Date and Time:  02/01/2018 8:11 AM    Hospital Course (synopsis of major diagnoses, care, treatment, and services provided during the course of the hospital stay): Elective admission for left and right heart catheterization to evaluate etiology and filling pressures.  He's found to have normal filling pressures with nonischemic cardiomyopathy.  With baseline left bundle branch block will plan for biventricular ICD as an outpatient.     Final Diagnosis: Post-Op Diagnosis Codes:     * Chronic combined systolic and diastolic congestive heart failure [I50.42]    Disposition: Home or Self Care    Follow Up/Patient Instructions:   Diet cardiac  Activity as tolerated  Routine post-catheter instructions given    Medications:  Reconciled Home Medications:   Current Discharge Medication List      CONTINUE these medications which have NOT CHANGED    Details   aspirin 325 MG tablet Take 325 mg by mouth once daily.      carvedilol (COREG) 6.25 MG tablet Take 1 tablet (6.25 mg  total) by mouth 2 (two) times daily with meals.  Qty: 180 tablet, Refills: 3      furosemide (LASIX) 40 MG tablet Take 1.5 tablets (60 mg total) by mouth 2 (two) times daily.  Qty: 180 tablet, Refills: 3      pravastatin (PRAVACHOL) 80 MG tablet Take 1 tablet (80 mg total) by mouth once daily.  Qty: 90 tablet, Refills: 3      sacubitril-valsartan (ENTRESTO) 49-51 mg per tablet Take 1 tablet by mouth 2 (two) times daily.  Qty: 180 tablet, Refills: 3      spironolactone (ALDACTONE) 25 MG tablet Take 1 tablet (25 mg total) by mouth once daily.  Qty: 90 tablet, Refills: 3           No discharge procedures on file.  Follow-up Information     Shailesh Eng MD In 1 week.    Specialties:  INTERVENTIONAL CARDIOLOGY, Cardiology  Contact information:  [address] [place], [place] [id number] [phone]

## 2018-02-09 ENCOUNTER — OFFICE VISIT (OUTPATIENT)
Dept: CARDIOLOGY | Facility: CLINIC | Age: 63
End: 2018-02-09
Payer: MEDICARE

## 2018-02-09 VITALS
HEART RATE: 83 BPM | WEIGHT: 315 LBS | DIASTOLIC BLOOD PRESSURE: 71 MMHG | BODY MASS INDEX: 40.26 KG/M2 | OXYGEN SATURATION: 93 % | SYSTOLIC BLOOD PRESSURE: 134 MMHG

## 2018-02-09 DIAGNOSIS — E78.49 OTHER HYPERLIPIDEMIA: ICD-10-CM

## 2018-02-09 DIAGNOSIS — I10 ESSENTIAL HYPERTENSION: ICD-10-CM

## 2018-02-09 DIAGNOSIS — Z72.0 TOBACCO ABUSE: ICD-10-CM

## 2018-02-09 DIAGNOSIS — I50.42 CHRONIC COMBINED SYSTOLIC AND DIASTOLIC CONGESTIVE HEART FAILURE: Primary | ICD-10-CM

## 2018-02-09 PROCEDURE — 99999 PR PBB SHADOW E&M-EST. PATIENT-LVL III: CPT | Mod: PBBFAC,,, | Performed by: INTERNAL MEDICINE

## 2018-02-09 PROCEDURE — 3008F BODY MASS INDEX DOCD: CPT | Mod: S$GLB,,, | Performed by: INTERNAL MEDICINE

## 2018-02-09 PROCEDURE — 99214 OFFICE O/P EST MOD 30 MIN: CPT | Mod: S$GLB,,, | Performed by: INTERNAL MEDICINE

## 2018-02-09 NOTE — PROGRESS NOTES
Subjective:    Patient ID:  Papito Bhakta is a 62 y.o. male who presents for follow-up of Post-op Evaluation      HPI   previous history:  Here for follow-up of systolic heart failure.  A repeat echocardiogram that shows still depressed LV function.  He denies any current chest pain, shortness of breath but does get swelling and is noncompliant with sodium intake.  He denies any PND or orthopnea.  He's express no dizziness, presyncope or syncope.  He's agreeable to cardiac catheterization.    Today:  Here for follow-up of systolic heart failure.  He underwent cardiac catheterization without any significant issues.  He had great filling pressures on right heart catheterization.  He's been doing well ever since starting on and trust [place].  We went over ICD placement which she didn't wishes to defer at this time.  He mainly has right lower extremity greater than left due to drainages issues.  He denies any PND or orthopnea.  He likely has sleep apnea but also wishes to defer sleep study.  He denies any dizziness, presyncope or syncope.    Review of Systems   Constitution: Negative.   HENT: Negative.    Eyes: Negative.    Cardiovascular: Positive for dyspnea on exertion and leg swelling. Negative for chest pain, irregular heartbeat, near-syncope, orthopnea, palpitations, paroxysmal nocturnal dyspnea and syncope.   Respiratory: Positive for shortness of breath.    Skin: Negative.    Musculoskeletal: Negative.    Gastrointestinal: Negative for abdominal pain, constipation and diarrhea.   Genitourinary: Negative for dysuria.   Neurological: Negative for dizziness.   Psychiatric/Behavioral: Negative.         Objective:    Physical Exam   Constitutional: He is oriented to person, place, and time. He appears well-developed and well-nourished. No distress.   HENT:   Head: Normocephalic and atraumatic.   Eyes: Conjunctivae and EOM are normal. Pupils are equal, round, and reactive to light.   Neck: Normal range of motion. Neck  supple. No thyromegaly present.   Cardiovascular: Normal rate, regular rhythm and normal heart sounds.    No murmur heard.  Pulmonary/Chest: Effort normal and breath sounds normal. No respiratory distress. He has no wheezes. He has no rales. He exhibits no tenderness.   Abdominal: Soft. Bowel sounds are normal.   Musculoskeletal: He exhibits edema.   Right greater than left   Neurological: He is alert and oriented to person, place, and time.   Skin: Skin is warm and dry.   Psychiatric: He has a normal mood and affect. His behavior is normal.       echo:  CONCLUSIONS     1 - Severely depressed left ventricular systolic function (EF 15-20%).     2 - Eccentric hypertrophy.     3 - Severe left ventricular enlargement.     4 - Biatrial enlargement.     5 - Restrictive LV filling pattern, indicating markedly elevated LAP (grade 3 diastolic dysfunction).     6 - Right ventricular enlargement with normal systolic function.     7 - Mild mitral regurgitation.     8 - Mild pulmonic regurgitation.     9 - Trivial pericardial effusion.     Right/left heart catheterization:  B. Summary/Post-Operative Diagnosis       Normal coronary arteries.    Systolic dysfunction.    Low right and left Filling Pressures.    Mild Pulmonary Hypertension.    Assessment:       1. Chronic combined systolic and diastolic congestive heart failure    2. Essential hypertension    3. Other hyperlipidemia    4. Tobacco abuse         Plan:       -Currently stable without any signs of central congestion, continue med therapy  -Wishes to defer sleep study and ICD placement *discussed risk/benefits and he understands and accepts  -Counseled on sodium restriction and diet/exercise  -Counseled on diet and exercise    Return to clinic in 3 months

## 2018-03-05 ENCOUNTER — LAB VISIT (OUTPATIENT)
Dept: LAB | Facility: HOSPITAL | Age: 63
End: 2018-03-05
Attending: FAMILY MEDICINE
Payer: MEDICARE

## 2018-03-05 ENCOUNTER — OFFICE VISIT (OUTPATIENT)
Dept: FAMILY MEDICINE | Facility: CLINIC | Age: 63
End: 2018-03-05
Payer: MEDICARE

## 2018-03-05 VITALS
WEIGHT: 315 LBS | OXYGEN SATURATION: 95 % | HEART RATE: 56 BPM | SYSTOLIC BLOOD PRESSURE: 110 MMHG | BODY MASS INDEX: 37.19 KG/M2 | HEIGHT: 77 IN | DIASTOLIC BLOOD PRESSURE: 78 MMHG | TEMPERATURE: 98 F

## 2018-03-05 DIAGNOSIS — Z11.59 NEED FOR HEPATITIS C SCREENING TEST: ICD-10-CM

## 2018-03-05 DIAGNOSIS — I10 ESSENTIAL HYPERTENSION: ICD-10-CM

## 2018-03-05 DIAGNOSIS — I50.42 CHRONIC COMBINED SYSTOLIC AND DIASTOLIC CONGESTIVE HEART FAILURE: Primary | ICD-10-CM

## 2018-03-05 PROCEDURE — 3074F SYST BP LT 130 MM HG: CPT | Mod: S$GLB,,, | Performed by: FAMILY MEDICINE

## 2018-03-05 PROCEDURE — 86803 HEPATITIS C AB TEST: CPT

## 2018-03-05 PROCEDURE — 99214 OFFICE O/P EST MOD 30 MIN: CPT | Mod: S$GLB,,, | Performed by: FAMILY MEDICINE

## 2018-03-05 PROCEDURE — 36415 COLL VENOUS BLD VENIPUNCTURE: CPT | Mod: PO

## 2018-03-05 PROCEDURE — 3078F DIAST BP <80 MM HG: CPT | Mod: S$GLB,,, | Performed by: FAMILY MEDICINE

## 2018-03-05 PROCEDURE — 99999 PR PBB SHADOW E&M-EST. PATIENT-LVL III: CPT | Mod: PBBFAC,,, | Performed by: FAMILY MEDICINE

## 2018-03-05 NOTE — PROGRESS NOTES
Chief Complaint   Patient presents with    Hypertension       HPI  Papito Bhakta is a 62 y.o. male with multiple medical diagnoses as listed in the medical history and problem list that presents for follow up for HTN and CHF. He has had an echo and a normal angiogram. They did offer him a pacemaker but he continues to decline. He does exercise daily. He has leg swelling but it has not gotten worse.    PAST MEDICAL HISTORY:  Past Medical History:   Diagnosis Date    CHF (congestive heart failure)     Hyperlipidemia     Hypertension        PAST SURGICAL HISTORY:  Past Surgical History:   Procedure Laterality Date    TESTICLE SURGERY         SOCIAL HISTORY:  Social History     Social History    Marital status:      Spouse name: N/A    Number of children: N/A    Years of education: N/A     Occupational History    Not on file.     Social History Main Topics    Smoking status: Former Smoker     Packs/day: 0.50     Years: 40.00     Types: Cigarettes, Cigars     Quit date: 2014    Smokeless tobacco: Never Used    Alcohol use Yes      Comment: once a month    Drug use: No    Sexual activity: Yes     Birth control/ protection: None      Comment: uses protection sometimes     Other Topics Concern    Not on file     Social History Narrative    No narrative on file       FAMILY HISTORY:  Family History   Problem Relation Age of Onset    Epilepsy Mother        ALLERGIES AND MEDICATIONS: updated and reviewed.  Review of patient's allergies indicates:  No Known Allergies  Current Outpatient Prescriptions   Medication Sig Dispense Refill    aspirin 325 MG tablet Take 325 mg by mouth once daily.      carvedilol (COREG) 6.25 MG tablet Take 1 tablet (6.25 mg total) by mouth 2 (two) times daily with meals. 180 tablet 3    furosemide (LASIX) 40 MG tablet Take 1.5 tablets (60 mg total) by mouth 2 (two) times daily. 180 tablet 3    pravastatin (PRAVACHOL) 80 MG tablet Take 1 tablet (80 mg total) by mouth once  "daily. 90 tablet 3    sacubitril-valsartan (ENTRESTO) 49-51 mg per tablet Take 1 tablet by mouth 2 (two) times daily. 180 tablet 3    spironolactone (ALDACTONE) 25 MG tablet Take 1 tablet (25 mg total) by mouth once daily. 90 tablet 3     No current facility-administered medications for this visit.        ROS  Review of Systems   Constitutional: Negative for chills, fatigue, fever and unexpected weight change.   HENT: Negative for ear pain, postnasal drip, rhinorrhea, sinus pressure and sore throat.    Eyes: Negative for photophobia and visual disturbance.   Respiratory: Negative for apnea, cough, chest tightness, shortness of breath and wheezing.    Cardiovascular: Positive for leg swelling. Negative for chest pain and palpitations.   Gastrointestinal: Negative for abdominal pain, blood in stool, constipation, diarrhea, nausea and vomiting.   Genitourinary: Negative for difficulty urinating.   Musculoskeletal: Negative for arthralgias and joint swelling.   Skin: Negative for rash.   Neurological: Negative for facial asymmetry, speech difficulty, weakness, numbness and headaches.   Psychiatric/Behavioral: Negative for dysphoric mood.       Physical Exam  Vitals:    03/05/18 0928   BP: 110/78   BP Location: Left arm   Patient Position: Sitting   BP Method: Large (Manual)   Pulse: (!) 56   Temp: 97.7 °F (36.5 °C)   TempSrc: Oral   SpO2: 95%   Weight: (!) 153.8 kg (339 lb 2.8 oz)   Height: 6' 5" (1.956 m)    Body mass index is 40.22 kg/m².  Weight: (!) 153.8 kg (339 lb 2.8 oz)   Height: 6' 5" (195.6 cm)     Physical Exam   Constitutional: He is oriented to person, place, and time. He appears well-developed and well-nourished.   HENT:   Head: Normocephalic and atraumatic.   Mouth/Throat: No oropharyngeal exudate.   Eyes: EOM are normal.   Cardiovascular: Normal rate, regular rhythm and normal heart sounds.  Exam reveals no gallop and no friction rub.    No murmur heard.  BLE greatest on the right with 3+ pitting edema "   Pulmonary/Chest: Effort normal and breath sounds normal. No respiratory distress. He has no wheezes. He has no rales. He exhibits no tenderness.   Lymphadenopathy:     He has no cervical adenopathy.   Neurological: He is alert and oriented to person, place, and time.   Skin: Skin is warm and dry.   Psychiatric: He has a normal mood and affect. His behavior is normal.   Nursing note and vitals reviewed.      Health Maintenance       Date Due Completion Date    Hepatitis C Screening 1955 ---    TETANUS VACCINE 05/05/1973 ---    Pneumococcal PPSV23 (Medium Risk) (1) 05/05/1973 ---    Colonoscopy 05/05/2005 ---    Zoster Vaccine 05/05/2015 ---    Lipid Panel 12/02/2022 12/2/2017            ASSESSMENT     1. Chronic combined systolic and diastolic congestive heart failure    2. Essential hypertension    3. BMI 40.0-44.9, adult    4. Need for hepatitis C screening test        PLAN:     Problem List Items Addressed This Visit        Cardiac/Vascular    Essential hypertension  -This is a chronic medical condition that is stable under the current regimen. Continue to monitor and we will make medication adjustments as needed.       Chronic combined systolic and diastolic congestive heart failure - Primary  -continue follow up with Dr. Eng, his cardiologist, on entresto, statin, BB, does not want pacemaker and understands the risks  -continue to elevate both legs as often as possible  -recommend he continue exercise       Endocrine    BMI 40.0-44.9, adult    Overview     -The patient is asked to make an attempt to improve diet and exercise patterns to aid in medical management of this problem.              Other Visit Diagnoses     Need for hepatitis C screening test        Relevant Orders    Hepatitis C antibody            Brynn To MD  03/05/2018 10:22 AM        Follow-up in about 3 months (around 6/5/2018) for Follow up.

## 2018-03-06 LAB — HCV AB SERPL QL IA: NEGATIVE

## 2018-05-10 ENCOUNTER — OFFICE VISIT (OUTPATIENT)
Dept: CARDIOLOGY | Facility: CLINIC | Age: 63
End: 2018-05-10
Payer: MEDICARE

## 2018-05-10 VITALS
SYSTOLIC BLOOD PRESSURE: 124 MMHG | RESPIRATION RATE: 20 BRPM | OXYGEN SATURATION: 99 % | BODY MASS INDEX: 40 KG/M2 | WEIGHT: 315 LBS | HEART RATE: 88 BPM | DIASTOLIC BLOOD PRESSURE: 84 MMHG

## 2018-05-10 DIAGNOSIS — I10 ESSENTIAL HYPERTENSION: ICD-10-CM

## 2018-05-10 DIAGNOSIS — Z72.0 TOBACCO ABUSE: ICD-10-CM

## 2018-05-10 DIAGNOSIS — I50.42 CHRONIC COMBINED SYSTOLIC AND DIASTOLIC CONGESTIVE HEART FAILURE: Primary | ICD-10-CM

## 2018-05-10 DIAGNOSIS — E66.01 CLASS 2 SEVERE OBESITY DUE TO EXCESS CALORIES WITH SERIOUS COMORBIDITY AND BODY MASS INDEX (BMI) OF 37.0 TO 37.9 IN ADULT: ICD-10-CM

## 2018-05-10 DIAGNOSIS — E78.49 OTHER HYPERLIPIDEMIA: ICD-10-CM

## 2018-05-10 PROCEDURE — 3008F BODY MASS INDEX DOCD: CPT | Mod: CPTII,S$GLB,, | Performed by: INTERNAL MEDICINE

## 2018-05-10 PROCEDURE — 3079F DIAST BP 80-89 MM HG: CPT | Mod: CPTII,S$GLB,, | Performed by: INTERNAL MEDICINE

## 2018-05-10 PROCEDURE — 99214 OFFICE O/P EST MOD 30 MIN: CPT | Mod: S$GLB,,, | Performed by: INTERNAL MEDICINE

## 2018-05-10 PROCEDURE — 3074F SYST BP LT 130 MM HG: CPT | Mod: CPTII,S$GLB,, | Performed by: INTERNAL MEDICINE

## 2018-05-10 PROCEDURE — 99999 PR PBB SHADOW E&M-EST. PATIENT-LVL III: CPT | Mod: PBBFAC,,, | Performed by: INTERNAL MEDICINE

## 2018-05-10 NOTE — PROGRESS NOTES
Subjective:    Patient ID:  Papito Bhakta is a 63 y.o. male who presents for follow-up of No chief complaint on file.      HPI   previous history:  Here for follow-up of systolic heart failure.  He underwent cardiac catheterization without any significant issues.  He had great filling pressures on right heart catheterization.  He's been doing well ever since starting on and [facility] [place].  We went over ICD placement which she didn't wishes to defer at this time.  He mainly has right lower extremity greater than left due to drainages issues.  He denies any PND or orthopnea.  He likely has sleep apnea but also wishes to defer sleep study.  He denies any dizziness, presyncope or syncope.    Today:  Here follow-up of systolic heart failure.  He has stable shortness of breath on heavier exertion but relieved with rest.  He denies any chest pain or palpitations.  He's express no PND, orthopnea but has stable lower extremity edema right greater than left which is chronic.  He denies any dizziness, presyncope or syncope.    Review of Systems   Constitution: Negative.   HENT: Negative.    Eyes: Negative.    Cardiovascular: Positive for dyspnea on exertion and leg swelling. Negative for chest pain, irregular heartbeat, near-syncope, orthopnea, palpitations, paroxysmal nocturnal dyspnea and syncope.   Respiratory: Positive for shortness of breath.    Skin: Negative.    Musculoskeletal: Negative.    Gastrointestinal: Negative for abdominal pain, constipation and diarrhea.   Genitourinary: Negative for dysuria.   Neurological: Negative for dizziness.   Psychiatric/Behavioral: Negative.         Objective:    Physical Exam   Constitutional: He is oriented to person, place, and time. He appears well-developed and well-nourished. No distress.   HENT:   Head: Normocephalic and atraumatic.   Eyes: Conjunctivae and EOM are normal. Pupils are equal, round, and reactive to light.   Neck: Normal range of motion. Neck supple. No thyromegaly  present.   Cardiovascular: Normal rate, regular rhythm and normal heart sounds.    No murmur heard.  Pulmonary/Chest: Effort normal and breath sounds normal. No respiratory distress. He has no wheezes. He has no rales. He exhibits no tenderness.   Abdominal: Soft. Bowel sounds are normal.   Musculoskeletal: He exhibits edema.   Right greater than left   Neurological: He is alert and oriented to person, place, and time.   Skin: Skin is warm and dry.   Psychiatric: He has a normal mood and affect. His behavior is normal.       echo:  1-18  CONCLUSIONS     1 - Severely depressed left ventricular systolic function (EF 15-20%).     2 - Eccentric hypertrophy.     3 - Severe left ventricular enlargement.     4 - Biatrial enlargement.     5 - Restrictive LV filling pattern, indicating markedly elevated LAP (grade 3 diastolic dysfunction).     6 - Right ventricular enlargement with normal systolic function.     7 - Mild mitral regurgitation.     8 - Mild pulmonic regurgitation.     9 - Trivial pericardial effusion.     Right/left heart catheterization:  2-18  B. Summary/Post-Operative Diagnosis       Normal coronary arteries.    Systolic dysfunction.    Low right and left Filling Pressures.    Mild Pulmonary Hypertension.    Assessment:       1. Chronic combined systolic and diastolic congestive heart failure    2. Essential hypertension    3. Other hyperlipidemia    4. Tobacco abuse    5. Class 2 severe obesity due to excess calories with serious comorbidity and body mass index (BMI) of 37.0 to 37.9 in adult         Plan:       -Currently stable without any signs of central congestion, continue med therapy  -Wishes to defer sleep study and ICD placement *discussed risk/benefits and he understands and accepts  -Chronic lower extremity edema 2nd venous stasis is stable on current medicines  -Counseled on sodium restriction and diet/exercise  -Counseled on diet and exercise    Return to clinic in 6 months

## 2018-06-06 ENCOUNTER — OFFICE VISIT (OUTPATIENT)
Dept: FAMILY MEDICINE | Facility: CLINIC | Age: 63
End: 2018-06-06
Payer: MEDICARE

## 2018-06-06 VITALS
WEIGHT: 315 LBS | SYSTOLIC BLOOD PRESSURE: 104 MMHG | RESPIRATION RATE: 16 BRPM | HEIGHT: 77 IN | OXYGEN SATURATION: 98 % | DIASTOLIC BLOOD PRESSURE: 66 MMHG | BODY MASS INDEX: 37.19 KG/M2 | HEART RATE: 58 BPM | TEMPERATURE: 99 F

## 2018-06-06 DIAGNOSIS — I10 ESSENTIAL HYPERTENSION: ICD-10-CM

## 2018-06-06 DIAGNOSIS — Z23 NEED FOR TETANUS BOOSTER: ICD-10-CM

## 2018-06-06 DIAGNOSIS — I50.42 CHRONIC COMBINED SYSTOLIC AND DIASTOLIC CONGESTIVE HEART FAILURE: Primary | ICD-10-CM

## 2018-06-06 DIAGNOSIS — Z23 NEED FOR PROPHYLACTIC VACCINATION WITH STREPTOCOCCUS PNEUMONIAE (PNEUMOCOCCUS) AND INFLUENZA VACCINES: ICD-10-CM

## 2018-06-06 PROCEDURE — 3078F DIAST BP <80 MM HG: CPT | Mod: CPTII,S$GLB,, | Performed by: FAMILY MEDICINE

## 2018-06-06 PROCEDURE — 3008F BODY MASS INDEX DOCD: CPT | Mod: CPTII,S$GLB,, | Performed by: FAMILY MEDICINE

## 2018-06-06 PROCEDURE — 90732 PPSV23 VACC 2 YRS+ SUBQ/IM: CPT | Mod: S$GLB,,, | Performed by: FAMILY MEDICINE

## 2018-06-06 PROCEDURE — 90714 TD VACC NO PRESV 7 YRS+ IM: CPT | Mod: S$GLB,,, | Performed by: FAMILY MEDICINE

## 2018-06-06 PROCEDURE — G0009 ADMIN PNEUMOCOCCAL VACCINE: HCPCS | Mod: 59,S$GLB,, | Performed by: FAMILY MEDICINE

## 2018-06-06 PROCEDURE — 99999 PR PBB SHADOW E&M-EST. PATIENT-LVL III: CPT | Mod: PBBFAC,,, | Performed by: FAMILY MEDICINE

## 2018-06-06 PROCEDURE — 90471 IMMUNIZATION ADMIN: CPT | Mod: S$GLB,,, | Performed by: FAMILY MEDICINE

## 2018-06-06 PROCEDURE — 3074F SYST BP LT 130 MM HG: CPT | Mod: CPTII,S$GLB,, | Performed by: FAMILY MEDICINE

## 2018-06-06 PROCEDURE — 99214 OFFICE O/P EST MOD 30 MIN: CPT | Mod: 25,S$GLB,, | Performed by: FAMILY MEDICINE

## 2018-06-06 NOTE — PROGRESS NOTES
Chief Complaint   Patient presents with    Hypertension    Follow-up       HPI  Papito Bhakta is a 63 y.o. male with multiple medical diagnoses as listed in the medical history and problem list that presents for follow-up for congestive heart failure and hypertension. He has been doing well and not having worsening of his shortness of breath. He denies orthopnea, chest pains. He has trouble sleeping due to his fluid pills as he wakes up frequently at night to urinate.     PAST MEDICAL HISTORY:  Past Medical History:   Diagnosis Date    CHF (congestive heart failure)     Hyperlipidemia     Hypertension        PAST SURGICAL HISTORY:  Past Surgical History:   Procedure Laterality Date    TESTICLE SURGERY         SOCIAL HISTORY:  Social History     Social History    Marital status:      Spouse name: N/A    Number of children: N/A    Years of education: N/A     Occupational History    Not on file.     Social History Main Topics    Smoking status: Former Smoker     Packs/day: 0.50     Years: 40.00     Types: Cigarettes, Cigars     Quit date: 2014    Smokeless tobacco: Never Used    Alcohol use Yes      Comment: once a month    Drug use: No    Sexual activity: Yes     Birth control/ protection: None      Comment: uses protection sometimes     Other Topics Concern    Not on file     Social History Narrative    No narrative on file       FAMILY HISTORY:  Family History   Problem Relation Age of Onset    Epilepsy Mother        ALLERGIES AND MEDICATIONS: updated and reviewed.  Review of patient's allergies indicates:  No Known Allergies  Current Outpatient Prescriptions   Medication Sig Dispense Refill    aspirin 325 MG tablet Take 325 mg by mouth once daily.      carvedilol (COREG) 6.25 MG tablet Take 1 tablet (6.25 mg total) by mouth 2 (two) times daily with meals. 180 tablet 3    furosemide (LASIX) 40 MG tablet Take 1.5 tablets (60 mg total) by mouth 2 (two) times daily. 180 tablet 3     "pravastatin (PRAVACHOL) 80 MG tablet Take 1 tablet (80 mg total) by mouth once daily. 90 tablet 3    spironolactone (ALDACTONE) 25 MG tablet Take 1 tablet (25 mg total) by mouth once daily. 90 tablet 3     No current facility-administered medications for this visit.        ROS  Review of Systems   Constitutional: Negative for chills, fatigue, fever and unexpected weight change.   HENT: Negative for ear pain, postnasal drip, rhinorrhea, sinus pressure and sore throat.    Eyes: Negative for photophobia and visual disturbance.   Respiratory: Negative for apnea, cough, chest tightness, shortness of breath and wheezing.    Cardiovascular: Positive for leg swelling. Negative for chest pain and palpitations.   Gastrointestinal: Negative for abdominal pain, blood in stool, constipation, diarrhea, nausea and vomiting.   Genitourinary: Negative for difficulty urinating.   Musculoskeletal: Negative for arthralgias and joint swelling.   Skin: Negative for rash.   Neurological: Negative for facial asymmetry, speech difficulty, weakness, numbness and headaches.   Psychiatric/Behavioral: Negative for dysphoric mood.       Physical Exam  Vitals:    06/06/18 0937   BP: 104/66   Pulse: (!) 58   Resp: 16   Temp: 98.6 °F (37 °C)   TempSrc: Oral   SpO2: 98%   Weight: (!) 149.7 kg (330 lb)   Height: 6' 5" (1.956 m)    Body mass index is 39.13 kg/m².  Weight: (!) 149.7 kg (330 lb)   Height: 6' 5" (195.6 cm)     Physical Exam   Constitutional: He is oriented to person, place, and time. He appears well-developed and well-nourished.   HENT:   Head: Normocephalic and atraumatic.   Eyes: EOM are normal.   Cardiovascular: Normal rate, regular rhythm and normal heart sounds.  Exam reveals no gallop and no friction rub.    No murmur heard.  Bilateral lower extremity edema, 1+ on the right lower ext, 2+ pitting on the right lower extremity   Pulmonary/Chest: Effort normal and breath sounds normal. No respiratory distress. He has no wheezes. He " has no rales. He exhibits no tenderness.   Neurological: He is alert and oriented to person, place, and time.   Skin: Skin is warm and dry.   Psychiatric: He has a normal mood and affect. His behavior is normal.   Nursing note and vitals reviewed.      Health Maintenance       Date Due Completion Date    TETANUS VACCINE 05/05/1973 ---    Pneumococcal PPSV23 (Medium Risk) (1) 05/05/1973 ---    Zoster Vaccine 05/05/2015 ---    Influenza Vaccine 08/01/2018 11/1/2017 (Done)    Override on 11/1/2017: Done ([facility])    Lipid Panel 12/02/2022 12/2/2017    Colonoscopy 10/10/2026 10/10/2016 (Done)    Override on 10/10/2016: Done            ASSESSMENT     1. Chronic combined systolic and diastolic congestive heart failure    2. Essential hypertension    3. BMI 40.0-44.9, adult    4. Need for prophylactic vaccination with Streptococcus pneumoniae (Pneumococcus) and Influenza vaccines    5. Need for tetanus booster        PLAN:     Problem List Items Addressed This Visit        Cardiac/Vascular    Essential hypertension  -blood pressure, on the lower end, he is asymptomatic without dizziness or orthostatic symptoms    Chronic combined systolic and diastolic congestive heart failure - Primary  On BB, aldactone, and lasix       Endocrine    BMI 40.0-44.9, adult    Overview     -The patient is asked to make an attempt to improve diet and exercise patterns to aid in medical management of this problem.              Other Visit Diagnoses     Need for prophylactic vaccination with Streptococcus pneumoniae (Pneumococcus) and Influenza vaccines      -as ordered       Relevant Orders    Pneumococcal Polysaccharide Vaccine (23 Valent) (SQ/IM)    Need for tetanus booster      -as ordered       Relevant Orders    (In Office Administered) Td Vaccine - Preservative Free            Brynn To MD  06/06/2018 10:38 AM        Follow-up in about 6 months (around 12/6/2018) for Follow up.

## 2018-06-30 ENCOUNTER — TELEPHONE (OUTPATIENT)
Dept: ADMINISTRATIVE | Facility: HOSPITAL | Age: 63
End: 2018-06-30

## 2018-08-03 RX ORDER — FUROSEMIDE 40 MG/1
60 TABLET ORAL 2 TIMES DAILY
Qty: 180 TABLET | Refills: 3 | Status: ON HOLD | OUTPATIENT
Start: 2018-08-03 | End: 2018-11-05 | Stop reason: HOSPADM

## 2018-10-26 ENCOUNTER — TELEPHONE (OUTPATIENT)
Dept: FAMILY MEDICINE | Facility: CLINIC | Age: 63
End: 2018-10-26

## 2018-10-26 ENCOUNTER — HOSPITAL ENCOUNTER (EMERGENCY)
Facility: HOSPITAL | Age: 63
Discharge: HOME | End: 2018-10-26
Attending: EMERGENCY MEDICINE
Payer: MEDICARE

## 2018-10-26 VITALS
BODY MASS INDEX: 37.19 KG/M2 | HEIGHT: 77 IN | SYSTOLIC BLOOD PRESSURE: 117 MMHG | DIASTOLIC BLOOD PRESSURE: 74 MMHG | HEART RATE: 65 BPM | TEMPERATURE: 97 F | OXYGEN SATURATION: 96 % | RESPIRATION RATE: 14 BRPM | WEIGHT: 315 LBS

## 2018-10-26 DIAGNOSIS — M10.9 ACUTE GOUT OF RIGHT FOOT, UNSPECIFIED CAUSE: ICD-10-CM

## 2018-10-26 DIAGNOSIS — M79.671 PAIN OF RIGHT HEEL: ICD-10-CM

## 2018-10-26 DIAGNOSIS — M72.2 PLANTAR FASCIITIS OF RIGHT FOOT: Primary | ICD-10-CM

## 2018-10-26 DIAGNOSIS — M72.2 PLANTAR FASCIITIS: Primary | ICD-10-CM

## 2018-10-26 LAB — URATE SERPL-MCNC: 10.5 MG/DL

## 2018-10-26 PROCEDURE — 99284 EMERGENCY DEPT VISIT MOD MDM: CPT | Mod: 25,HCNC

## 2018-10-26 PROCEDURE — 63600175 PHARM REV CODE 636 W HCPCS: Mod: HCNC | Performed by: EMERGENCY MEDICINE

## 2018-10-26 PROCEDURE — 96372 THER/PROPH/DIAG INJ SC/IM: CPT | Mod: HCNC

## 2018-10-26 PROCEDURE — 84550 ASSAY OF BLOOD/URIC ACID: CPT | Mod: HCNC

## 2018-10-26 RX ORDER — DEXAMETHASONE SODIUM PHOSPHATE 4 MG/ML
4 INJECTION, SOLUTION INTRA-ARTICULAR; INTRALESIONAL; INTRAMUSCULAR; INTRAVENOUS; SOFT TISSUE
Status: COMPLETED | OUTPATIENT
Start: 2018-10-26 | End: 2018-10-26

## 2018-10-26 RX ORDER — TRAMADOL HYDROCHLORIDE 50 MG/1
50 TABLET ORAL EVERY 6 HOURS PRN
Qty: 12 TABLET | Refills: 0 | Status: SHIPPED | OUTPATIENT
Start: 2018-10-26 | End: 2018-11-04

## 2018-10-26 RX ORDER — KETOROLAC TROMETHAMINE 30 MG/ML
30 INJECTION, SOLUTION INTRAMUSCULAR; INTRAVENOUS
Status: COMPLETED | OUTPATIENT
Start: 2018-10-26 | End: 2018-10-26

## 2018-10-26 RX ORDER — KETOROLAC TROMETHAMINE 10 MG/1
10 TABLET, FILM COATED ORAL EVERY 6 HOURS
Qty: 20 TABLET | Refills: 0 | Status: ON HOLD | OUTPATIENT
Start: 2018-10-26 | End: 2018-11-05 | Stop reason: HOSPADM

## 2018-10-26 RX ADMIN — KETOROLAC TROMETHAMINE 30 MG: 30 INJECTION INTRAMUSCULAR; INTRAVENOUS at 09:10

## 2018-10-26 RX ADMIN — DEXAMETHASONE SODIUM PHOSPHATE 4 MG: 4 INJECTION, SOLUTION INTRAMUSCULAR; INTRAVENOUS at 09:10

## 2018-10-26 NOTE — ED NOTES
Patient reporting he has had intermittent swelling to the leg since a bee sting 30 years ago.  Occurring off and on since the bee sting.  [place].  Has a hx of chf and he has been placed on lasix and potassium tablets.

## 2018-10-26 NOTE — TELEPHONE ENCOUNTER
----- Message from Flower Ramos sent at 10/26/2018 11:32 AM CDT -----  Contact: Self   Patient is asking for a referral to Podiatry. Please call at [phone].

## 2018-10-26 NOTE — TELEPHONE ENCOUNTER
Daughter states patient was seen in ER on today and dx with plantar fasciitis of right foot. Requesting podiatry referral. Please advise

## 2018-10-26 NOTE — ED PROVIDER NOTES
Encounter Date: 10/26/2018       History     Chief Complaint   Patient presents with    Ankle Pain     Patient reports 3 days of right ankle/lower leg pain and swelling w/o injury. Pt has hx of CHF    Leg Swelling     Chief complaint:  Foot pain and swelling  63-year-old complains of pain to his heel to his toes on the right.  Patient has had this pain and swelling intermittently for the last 2 days.  He describes the pain as throbbing and rates it as 9.5.  It worsens when he walks and improves when he elevates it.  The patient denies any unusual activities or trauma.  Patient admits that he has chronic swelling to the right leg for 30 years.  This has not worsened.  The new finding is pain to the heel and the sole of the foot.  Patient with history of CHF but denies chest pain or shortness of breath.      The history is provided by the patient.     Review of patient's allergies indicates:  No Known Allergies  Past Medical History:   Diagnosis Date    CHF (congestive heart failure)     Hyperlipidemia     Hypertension      Past Surgical History:   Procedure Laterality Date    Heart Cath-Bilateral Bilateral 2018    Performed by [facility] at [facility] C[facility] LAB    TESTICLE SURGERY       Family History   Problem Relation Age of Onset    Epilepsy Mother      Social History     Tobacco Use    Smoking status: Former Smoker     Packs/day: 0.50     Years: 40.00     Pack years: 20.00     Types: Cigarettes, Cigars     Last attempt to quit: 2014     Years since quittin.8    Smokeless tobacco: Never Used   Substance Use Topics    Alcohol use: Yes     Comment: once a month    Drug use: No     Review of Systems   Constitutional: Negative for fever.   Respiratory: Negative for shortness of breath.    Cardiovascular: Positive for leg swelling. Negative for chest pain.   Gastrointestinal: Negative for abdominal pain.   Genitourinary: Negative for decreased urine volume.   Musculoskeletal: Positive for gait  problem.   Skin: Negative for color change.       Physical Exam     Initial Vitals [10/26/18 0820]   BP Pulse Resp Temp SpO2   127/79 75 20 97.4 °F (36.3 °C) 97 %      MAP       --         Physical Exam    Constitutional: He appears well-developed and well-nourished.   HENT:   Head: Normocephalic and atraumatic.   Eyes: EOM are normal. Pupils are equal, round, and reactive to light.   Neck: Neck supple.   Cardiovascular: Normal rate, regular rhythm, normal heart sounds and intact distal pulses.   Pulmonary/Chest: Breath sounds normal.   Abdominal: Soft. There is no tenderness. There is no rebound and no guarding.   Musculoskeletal: Normal range of motion. He exhibits edema.   Chronic appearing edema to the right lower extremity with scaly skin.  Tenderness to the heel and plantar surface of the right foot without edema   Neurological: He is alert and oriented to person, place, and time. No cranial nerve deficit.   Skin: Skin is warm and dry.   Psychiatric: He has a normal mood and affect.         ED Course   Procedures  Labs Reviewed   URIC ACID          Imaging Results          X-Ray Foot Complete Right (In process)                  Medical Decision Making:   Initial Assessment:   63-year-old presents with pain and swelling to his right ftAnd heel.  Patient has chronic swelling to the right leg that has not worsened.  Vital signs are stable  ED Management:  I do not feel an ultrasound is necessary as patient mentions several times with the swelling to his leg is not new and has been present for 30 years.  X-ray of the right foot will be done uric acid will be sent.  Uric acid pending at time of discharge. Patient's x-ray showed a spur indicating probable plantar fasciitis.  Patient be treated with Toradol as well as Decadron.  He will be referred to Podiatry.  Will be discharged on Toradol and tramadol.   Uric acid was elevated. I called the patient and discussed his test results.                       Clinical  Impression:   The primary encounter diagnosis was Plantar fasciitis of right foot. A diagnosis of Pain of right heel was also pertinent to this visit.                             Angelia Ervin MD  10/26/18 0925       Angelia Ervin MD  10/26/18 154

## 2018-11-04 ENCOUNTER — HOSPITAL ENCOUNTER (INPATIENT)
Facility: HOSPITAL | Age: 63
LOS: 1 days | Discharge: HOME | DRG: 293 | End: 2018-11-05
Attending: EMERGENCY MEDICINE | Admitting: INTERNAL MEDICINE
Payer: MEDICARE

## 2018-11-04 DIAGNOSIS — R06.02 SOB (SHORTNESS OF BREATH): ICD-10-CM

## 2018-11-04 DIAGNOSIS — I50.9 HEART FAILURE: ICD-10-CM

## 2018-11-04 DIAGNOSIS — I50.9 ACUTE ON CHRONIC CONGESTIVE HEART FAILURE, UNSPECIFIED HEART FAILURE TYPE: Primary | ICD-10-CM

## 2018-11-04 LAB
ALBUMIN SERPL-MCNC: 3.5 G/DL (ref 3.3–5.5)
ALP SERPL-CCNC: 47 U/L (ref 42–141)
BILIRUB SERPL-MCNC: 1.7 MG/DL (ref 0.2–1.6)
BUN SERPL-MCNC: 15 MG/DL (ref 7–22)
CALCIUM SERPL-MCNC: 9.5 MG/DL (ref 8–10.3)
CHLORIDE SERPL-SCNC: 106 MMOL/L (ref 98–108)
CREAT SERPL-MCNC: 1.3 MG/DL (ref 0.6–1.2)
GLUCOSE SERPL-MCNC: 95 MG/DL (ref 73–118)
POC ALT (SGPT): 20 U/L (ref 10–47)
POC AST (SGOT): 25 U/L (ref 11–38)
POC B-TYPE NATRIURETIC PEPTIDE: 937 PG/ML (ref 0–100)
POC CARDIAC TROPONIN I: 0 NG/ML
POC D-DI: 374 NG/ML (ref 0–450)
POC TCO2: 29 MMOL/L (ref 18–33)
POTASSIUM BLD-SCNC: 4.5 MMOL/L (ref 3.6–5.1)
PROTEIN, POC: 6.9 G/DL (ref 6.4–8.1)
SAMPLE: NORMAL
SODIUM BLD-SCNC: 143 MMOL/L (ref 128–145)

## 2018-11-04 PROCEDURE — 93010 ELECTROCARDIOGRAM REPORT: CPT | Mod: HCNC,,, | Performed by: INTERNAL MEDICINE

## 2018-11-04 PROCEDURE — 94640 AIRWAY INHALATION TREATMENT: CPT | Mod: HCNC

## 2018-11-04 PROCEDURE — 63600175 PHARM REV CODE 636 W HCPCS: Mod: HCNC | Performed by: EMERGENCY MEDICINE

## 2018-11-04 PROCEDURE — 96374 THER/PROPH/DIAG INJ IV PUSH: CPT | Mod: HCNC

## 2018-11-04 PROCEDURE — 25000003 PHARM REV CODE 250: Mod: HCNC | Performed by: NURSE PRACTITIONER

## 2018-11-04 PROCEDURE — 93005 ELECTROCARDIOGRAM TRACING: CPT | Mod: HCNC

## 2018-11-04 PROCEDURE — 99285 EMERGENCY DEPT VISIT HI MDM: CPT | Mod: 25,HCNC

## 2018-11-04 PROCEDURE — 94761 N-INVAS EAR/PLS OXIMETRY MLT: CPT | Mod: HCNC

## 2018-11-04 PROCEDURE — 85025 COMPLETE CBC W/AUTO DIFF WBC: CPT | Mod: HCNC

## 2018-11-04 PROCEDURE — 83880 ASSAY OF NATRIURETIC PEPTIDE: CPT | Mod: HCNC

## 2018-11-04 PROCEDURE — 21400001 HC TELEMETRY ROOM: Mod: HCNC

## 2018-11-04 PROCEDURE — 84484 ASSAY OF TROPONIN QUANT: CPT | Mod: HCNC

## 2018-11-04 PROCEDURE — 27000221 HC OXYGEN, UP TO 24 HOURS: Mod: HCNC

## 2018-11-04 PROCEDURE — 85379 FIBRIN DEGRADATION QUANT: CPT | Mod: HCNC

## 2018-11-04 PROCEDURE — 96376 TX/PRO/DX INJ SAME DRUG ADON: CPT

## 2018-11-04 PROCEDURE — 25000242 PHARM REV CODE 250 ALT 637 W/ HCPCS: Mod: HCNC | Performed by: NURSE PRACTITIONER

## 2018-11-04 PROCEDURE — 63600175 PHARM REV CODE 636 W HCPCS: Mod: HCNC | Performed by: NURSE PRACTITIONER

## 2018-11-04 PROCEDURE — 80053 COMPREHEN METABOLIC PANEL: CPT | Mod: HCNC

## 2018-11-04 RX ORDER — FUROSEMIDE 10 MG/ML
80 INJECTION INTRAMUSCULAR; INTRAVENOUS 2 TIMES DAILY
Status: DISCONTINUED | OUTPATIENT
Start: 2018-11-04 | End: 2018-11-05 | Stop reason: HOSPADM

## 2018-11-04 RX ORDER — ASPIRIN 325 MG
325 TABLET ORAL 2 TIMES DAILY
Status: DISCONTINUED | OUTPATIENT
Start: 2018-11-04 | End: 2018-11-05 | Stop reason: HOSPADM

## 2018-11-04 RX ORDER — FUROSEMIDE 10 MG/ML
40 INJECTION INTRAMUSCULAR; INTRAVENOUS
Status: COMPLETED | OUTPATIENT
Start: 2018-11-04 | End: 2018-11-04

## 2018-11-04 RX ORDER — PRAVASTATIN SODIUM 40 MG/1
80 TABLET ORAL DAILY
Status: DISCONTINUED | OUTPATIENT
Start: 2018-11-04 | End: 2018-11-05 | Stop reason: HOSPADM

## 2018-11-04 RX ORDER — SPIRONOLACTONE 25 MG/1
25 TABLET ORAL DAILY
Status: DISCONTINUED | OUTPATIENT
Start: 2018-11-04 | End: 2018-11-05 | Stop reason: HOSPADM

## 2018-11-04 RX ORDER — ENOXAPARIN SODIUM 100 MG/ML
40 INJECTION SUBCUTANEOUS EVERY 24 HOURS
Status: DISCONTINUED | OUTPATIENT
Start: 2018-11-04 | End: 2018-11-05 | Stop reason: HOSPADM

## 2018-11-04 RX ORDER — IPRATROPIUM BROMIDE AND ALBUTEROL SULFATE 2.5; .5 MG/3ML; MG/3ML
3 SOLUTION RESPIRATORY (INHALATION) EVERY 6 HOURS
Status: DISCONTINUED | OUTPATIENT
Start: 2018-11-04 | End: 2018-11-05 | Stop reason: HOSPADM

## 2018-11-04 RX ORDER — CARVEDILOL 6.25 MG/1
6.25 TABLET ORAL 2 TIMES DAILY WITH MEALS
Status: DISCONTINUED | OUTPATIENT
Start: 2018-11-04 | End: 2018-11-05 | Stop reason: HOSPADM

## 2018-11-04 RX ADMIN — IPRATROPIUM BROMIDE AND ALBUTEROL SULFATE 3 ML: .5; 2.5 SOLUTION RESPIRATORY (INHALATION) at 03:11

## 2018-11-04 RX ADMIN — FUROSEMIDE 80 MG: 10 INJECTION, SOLUTION INTRAMUSCULAR; INTRAVENOUS at 03:11

## 2018-11-04 RX ADMIN — FUROSEMIDE 40 MG: 10 INJECTION, SOLUTION INTRAMUSCULAR; INTRAVENOUS at 08:11

## 2018-11-04 RX ADMIN — PRAVASTATIN SODIUM 80 MG: 40 TABLET ORAL at 03:11

## 2018-11-04 RX ADMIN — ASPIRIN 325 MG ORAL TABLET 325 MG: 325 PILL ORAL at 09:11

## 2018-11-04 RX ADMIN — ENOXAPARIN SODIUM 40 MG: 100 INJECTION SUBCUTANEOUS at 05:11

## 2018-11-04 RX ADMIN — CARVEDILOL 6.25 MG: 6.25 TABLET, FILM COATED ORAL at 05:11

## 2018-11-04 RX ADMIN — SPIRONOLACTONE 25 MG: 25 TABLET ORAL at 03:11

## 2018-11-04 RX ADMIN — SACUBITRIL AND VALSARTAN 2 TABLET: 24; 26 TABLET, FILM COATED ORAL at 09:11

## 2018-11-04 NOTE — SUBJECTIVE & OBJECTIVE
Past Medical History:   Diagnosis Date    CHF (congestive heart failure)     Hyperlipidemia     Hypertension        Past Surgical History:   Procedure Laterality Date    Heart Cath-Bilateral Bilateral 2018    Performed by [facility]ail[facility] Eng [facility] at [facility] CATH LAB    TESTICLE SURGERY         Review of patient's allergies indicates:  No Known Allergies    No current facility-administered medications on file prior to encounter.      Current Outpatient Medications on File Prior to Encounter   Medication Sig    aspirin 325 MG tablet Take 325 mg by mouth 2 (two) times daily.     carvedilol (COREG) 6.25 MG tablet Take 1 tablet (6.25 mg total) by mouth 2 (two) times daily with meals.    furosemide (LASIX) 40 MG tablet Take 1.5 tablets (60 mg total) by mouth 2 (two) times daily.    ketorolac (TORADOL) 10 mg tablet Take 1 tablet (10 mg total) by mouth every 6 (six) hours.    pravastatin (PRAVACHOL) 80 MG tablet Take 1 tablet (80 mg total) by mouth once daily.    sacubitril-valsartan (ENTRESTO) 49-51 mg per tablet Take 1 tablet by mouth 2 (two) times daily.    spironolactone (ALDACTONE) 25 MG tablet Take 1 tablet (25 mg total) by mouth once daily.    [DISCONTINUED] traMADol (ULTRAM) 50 mg tablet Take 1 tablet (50 mg total) by mouth every 6 (six) hours as needed for Pain.     Family History     Problem Relation (Age of Onset)    Epilepsy Mother        Tobacco Use    Smoking status: Former Smoker     Packs/day: 0.50     Years: 40.00     Pack years: 20.00     Types: Cigarettes, Cigars     Last attempt to quit: 2014     Years since quittin.8    Smokeless tobacco: Never Used   Substance and Sexual Activity    Alcohol use: Yes     Comment: once a month    Drug use: No    Sexual activity: Yes     Birth control/protection: None     Comment: uses protection sometimes     Review of Systems   Constitutional: Positive for activity change and fatigue. Negative for chills, diaphoresis, fever and unexpected weight  change.   HENT: Negative.    Eyes: Negative.    Respiratory: Negative.    Cardiovascular: Negative.    Gastrointestinal: Positive for abdominal distention. Negative for nausea and vomiting.   Endocrine: Negative.    Genitourinary: Negative.    Musculoskeletal: Positive for arthralgias.   Allergic/Immunologic: Negative.    Neurological: Negative.    Hematological: Negative.    Psychiatric/Behavioral: Negative.      Objective:     Vital Signs (Most Recent):  Temp: 97.4 °F (36.3 °C) (11/04/18 1257)  Pulse: 67 (11/04/18 1257)  Resp: 18 (11/04/18 1257)  BP: 137/77 (11/04/18 1257)  SpO2: 99 % (11/04/18 1257) Vital Signs (24h Range):  Temp:  [97.4 °F (36.3 °C)-98 °F (36.7 °C)] 97.4 °F (36.3 °C)  Pulse:  [67-83] 67  Resp:  [14-20] 18  SpO2:  [95 %-100 %] 99 %  BP: (116-138)/(77-92) 137/77     Weight: (!) 148.3 kg (327 lb)  Body mass index is 38.78 kg/m².    Physical Exam   Constitutional: He is oriented to person, place, and time. He appears well-developed and well-nourished.   HENT:   Head: Atraumatic.   Eyes: EOM are normal.   Neck: Neck supple.   Pulmonary/Chest: He is in respiratory distress. He has wheezes (mild scattered wheezes).   Inspiratory crackles through out.    Abdominal: Soft. Bowel sounds are normal. He exhibits distension. There is no tenderness.   Musculoskeletal: He exhibits edema (R>L (right leg chronic edema)).   Neurological: He is alert and oriented to person, place, and time.   Skin: Skin is warm and dry.   Psychiatric: He has a normal mood and affect.         CRANIAL NERVES     CN III, IV, VI   Extraocular motions are normal.        Significant Labs: All pertinent labs within the past 24 hours have been reviewed.    Significant Imaging: I have reviewed and interpreted all pertinent imaging results/findings within the past 24 hours.

## 2018-11-04 NOTE — H&P
"[facility] Hospital Medicine  History & Physical    Patient Name: Papito Bhakta  MRN: [id number]  Admission Date: 11/4/2018  Attending Physician: Deborah Edge MD   Primary Care Provider: Brynn To MD         Patient information was obtained from patient and ER records.     Subjective:     Principal Problem:<principal problem not specified>    Chief Complaint:   Chief Complaint   Patient presents with    Shortness of Breath     Pt states," I have been short of breath since last night. I think I have extra fluid on my body."        HPI: 64 yo AAM with significant history for hypertension, hyperlipidemia, chronic LLE edema due to venous statsis, chronic systolic diastolic heart failure EF 15% and ?AMELIA who presents to [place] ED for shortness of breath that have progressively worse since Monday (7 days ago) then transferred to [facility] for further evaluation of acute on chronic combined heart failure. He initially felt symptoms were due to a cold. Denies chest pain but can feel fluid in lungs. Productive cough with white sputum. Denies headaches, dizziness, syncope, change in vision, palpitations, diaphoresis, abdominal pain, nausea, vomiting, or focal extremities weakness/numbness. Patient compliance with low salt diet and diuretics spirolactone 25 d, lasix 60 BID. Patient took additional lasix pill without relief. Gain 7 lbs 325-332. Currently, 85% on RA at rest and not able to speak in full sentences.     2/1/18 [facility] normal coronary arteries and pulmonary htn. Patient was seen by   Cardiologist Dr. Cain on 5/10/18 and patient declined sleep study and ICD at that time.     Past Medical History:   Diagnosis Date    CHF (congestive heart failure)     Hyperlipidemia     Hypertension        Past Surgical History:   Procedure Laterality Date    Heart Cath-Bilateral Bilateral 2/1/2018    Performed by [facility] at [facility] CATH LAB    TESTICLE SURGERY         Review of patient's " allergies indicates:  No Known Allergies    No current facility-administered medications on file prior to encounter.      Current Outpatient Medications on File Prior to Encounter   Medication Sig    aspirin 325 MG tablet Take 325 mg by mouth 2 (two) times daily.     carvedilol (COREG) 6.25 MG tablet Take 1 tablet (6.25 mg total) by mouth 2 (two) times daily with meals.    furosemide (LASIX) 40 MG tablet Take 1.5 tablets (60 mg total) by mouth 2 (two) times daily.    ketorolac (TORADOL) 10 mg tablet Take 1 tablet (10 mg total) by mouth every 6 (six) hours.    pravastatin (PRAVACHOL) 80 MG tablet Take 1 tablet (80 mg total) by mouth once daily.    sacubitril-valsartan (ENTRESTO) 49-51 mg per tablet Take 1 tablet by mouth 2 (two) times daily.    spironolactone (ALDACTONE) 25 MG tablet Take 1 tablet (25 mg total) by mouth once daily.    [DISCONTINUED] traMADol (ULTRAM) 50 mg tablet Take 1 tablet (50 mg total) by mouth every 6 (six) hours as needed for Pain.     Family History     Problem Relation (Age of Onset)    Epilepsy Mother        Tobacco Use    Smoking status: Former Smoker     Packs/day: 0.50     Years: 40.00     Pack years: 20.00     Types: Cigarettes, Cigars     Last attempt to quit:      Years since quittin.8    Smokeless tobacco: Never Used   Substance and Sexual Activity    Alcohol use: Yes     Comment: once a month    Drug use: No    Sexual activity: Yes     Birth control/protection: None     Comment: uses protection sometimes     Review of Systems   Constitutional: Positive for activity change and fatigue. Negative for chills, diaphoresis, fever and unexpected weight change.   HENT: Negative.    Eyes: Negative.    Respiratory: Negative.    Cardiovascular: Negative.    Gastrointestinal: Positive for abdominal distention. Negative for nausea and vomiting.   Endocrine: Negative.    Genitourinary: Negative.    Musculoskeletal: Positive for arthralgias.   Allergic/Immunologic: Negative.     Neurological: Negative.    Hematological: Negative.    Psychiatric/Behavioral: Negative.    Objective:     Vital Signs (Most Recent):  Temp: 97.9 °F (36.6 °C) (11/04/18 1537)  Pulse: 71 (11/04/18 1537)  Resp: 16 (11/04/18 1537)  BP: 134/75 (11/04/18 1537)  SpO2: 96 % (11/04/18 1537) Vital Signs (24h Range):  Temp:  [97.4 °F (36.3 °C)-98 °F (36.7 °C)] 97.9 °F (36.6 °C)  Pulse:  [67-84] 71  Resp:  [14-20] 16  SpO2:  [95 %-100 %] 96 %  BP: (116-138)/(75-92) 134/75     Weight: (!) 148.3 kg (327 lb)  Body mass index is 38.78 kg/m².    Physical Exam   Constitutional: He is oriented to person, place, and time. He appears well-developed and well-nourished.   HENT:   Head: Atraumatic.   Eyes: EOM are normal.   Neck: Neck supple.   Pulmonary/Chest: He is in respiratory distress. He has wheezes (mild scattered wheezes).   Inspiratory crackles through out.    Abdominal: Soft. Bowel sounds are normal. He exhibits distension. There is no tenderness.   Musculoskeletal: He exhibits edema (R>L (right leg chronic edema)).   Neurological: He is alert and oriented to person, place, and time.   Skin: Skin is warm and dry.   Psychiatric: He has a normal mood and affect.         CRANIAL NERVES     CN III, IV, VI   Extraocular motions are normal.        Significant Labs: All pertinent labs within the past 24 hours have been reviewed.    Significant Imaging: I have reviewed and interpreted all pertinent imaging results/findings within the past 24 hours.    Assessment/Plan:     Acute on chronic congestive heart failure    EF 15% 9 months ago. No syncope or presyncope. Patient is followed by Cardiologist Dr. Eng. Reports compliance with medication and diuretics. CXR pulmonary edema. . Minimal relief with IV lasix 40 mg at [facility]. Currently, 85% on RA at rest and not able to speak in full sentences.    Start lasix 100 mg IV BID. Continue spirolactone, entresto, coreg. Strict IO. Repeat 2 D echo.      Essential hypertension     Controlled. Continue antihypertensives above.          VTE Risk Mitigation (From admission, onward)        Ordered     enoxaparin injection 40 mg  Daily      11/04/18 1306     IP VTE HIGH RISK PATIENT  Once      11/04/18 1306             Darlin Baker NP  Department of Hospital Medicine   [facility]

## 2018-11-04 NOTE — ASSESSMENT & PLAN NOTE
EF 15% 9 months ago. No syncope or presyncope. Patient is followed by Cardiologist Dr. Eng. Reports compliance with medication and diuretics. CXR pulmonary edema. . Minimal relief with IV lasix 40 mg at [place] [facility]. Start lasix 100 mg IV BID. Continue spirolactone, entresto, coreg. Strict IO. Repeat 2 D echo.

## 2018-11-04 NOTE — H&P
"[facility] Medicine  History & Physical    Patient Name: Papito Bhakta  MRN: [id number]  Admission Date: 11/4/2018  Attending Physician: Deborah Edge MD   Primary Care Provider: Brynn To MD         Patient information was obtained from patient and ER records.     Subjective:     Principal Problem:<principal problem not specified>    Chief Complaint:   Chief Complaint   Patient presents with    Shortness of Breath     Pt states," I have been short of breath since last night. I think I have extra fluid on my body."        HPI: No notes on file    Past Medical History:   Diagnosis Date    CHF (congestive heart failure)     Hyperlipidemia     Hypertension        Past Surgical History:   Procedure Laterality Date    Heart Cath-Bilateral Bilateral 2/1/2018    Performed by Shailesh Eng MD at [facility] CATH LAB    TESTICLE SURGERY         Review of patient's allergies indicates:  No Known Allergies    No current facility-administered medications on file prior to encounter.      Current Outpatient Medications on File Prior to Encounter   Medication Sig    aspirin 325 MG tablet Take 325 mg by mouth 2 (two) times daily.     carvedilol (COREG) 6.25 MG tablet Take 1 tablet (6.25 mg total) by mouth 2 (two) times daily with meals.    furosemide (LASIX) 40 MG tablet Take 1.5 tablets (60 mg total) by mouth 2 (two) times daily.    ketorolac (TORADOL) 10 mg tablet Take 1 tablet (10 mg total) by mouth every 6 (six) hours.    pravastatin (PRAVACHOL) 80 MG tablet Take 1 tablet (80 mg total) by mouth once daily.    sacubitril-valsartan (ENTRESTO) 49-51 mg per tablet Take 1 tablet by mouth 2 (two) times daily.    spironolactone (ALDACTONE) 25 MG tablet Take 1 tablet (25 mg total) by mouth once daily.    [DISCONTINUED] traMADol (ULTRAM) 50 mg tablet Take 1 tablet (50 mg total) by mouth every 6 (six) hours as needed for Pain.     Family History     Problem Relation (Age of Onset)    " Epilepsy Mother        Tobacco Use    Smoking status: Former Smoker     Packs/day: 0.50     Years: 40.00     Pack years: 20.00     Types: Cigarettes, Cigars     Last attempt to quit: 2014     Years since quittin.8    Smokeless tobacco: Never Used   Substance and Sexual Activity    Alcohol use: Yes     Comment: once a month    Drug use: No    Sexual activity: Yes     Birth control/protection: None     Comment: uses protection sometimes     Review of Systems   Constitutional: Positive for activity change and fatigue. Negative for chills, diaphoresis, fever and unexpected weight change.   HENT: Negative.    Eyes: Negative.    Respiratory: Negative.    Cardiovascular: Negative.    Gastrointestinal: Positive for abdominal distention. Negative for nausea and vomiting.   Endocrine: Negative.    Genitourinary: Negative.    Musculoskeletal: Positive for arthralgias.   Allergic/Immunologic: Negative.    Neurological: Negative.    Hematological: Negative.    Psychiatric/Behavioral: Negative.      Objective:     Vital Signs (Most Recent):  Temp: 97.4 °F (36.3 °C) (18 1257)  Pulse: 67 (18 1257)  Resp: 18 (18 1257)  BP: 137/77 (18 1257)  SpO2: 99 % (18 1257) Vital Signs (24h Range):  Temp:  [97.4 °F (36.3 °C)-98 °F (36.7 °C)] 97.4 °F (36.3 °C)  Pulse:  [67-83] 67  Resp:  [14-20] 18  SpO2:  [95 %-100 %] 99 %  BP: (116-138)/(77-92) 137/77     Weight: (!) 148.3 kg (327 lb)  Body mass index is 38.78 kg/m².    Physical Exam   Constitutional: He is oriented to person, place, and time. He appears well-developed and well-nourished.   HENT:   Head: Atraumatic.   Eyes: EOM are normal.   Neck: Neck supple.   Pulmonary/Chest: He is in respiratory distress. He has wheezes (mild scattered wheezes).   Inspiratory crackles through out.    Abdominal: Soft. Bowel sounds are normal. He exhibits distension. There is no tenderness.   Musculoskeletal: He exhibits edema (R>L (right leg chronic edema)).    Neurological: He is alert and oriented to person, place, and time.   Skin: Skin is warm and dry.   Psychiatric: He has a normal mood and affect.         CRANIAL NERVES     CN III, IV, VI   Extraocular motions are normal.        Significant Labs: All pertinent labs within the past 24 hours have been reviewed.    Significant Imaging: I have reviewed and interpreted all pertinent imaging results/findings within the past 24 hours.    Assessment/Plan:     Acute on chronic congestive heart failure    EF 15% 9 months ago. No syncope or presyncope. Patient is followed by Cardiologist Dr. Eng. Reports compliance with medication and diuretics. CXR pulmonary edema. . Minimal relief with IV lasix 40 mg at [place] [facility]. Start lasix 80 mg IV BID. Continue spirolactone, entresto, coreg. Strict IO. Repeat 2 D echo.     Essential hypertension    Controlled. Continue antihypertensives above.          VTE Risk Mitigation (From admission, onward)        Ordered     enoxaparin injection 40 mg  Daily      11/04/18 1306     IP VTE HIGH RISK PATIENT  Once      11/04/18 1306             Darlin Baker NP  Department of Hospital Medicine   [facility]

## 2018-11-04 NOTE — SUBJECTIVE & OBJECTIVE
Past Medical History:   Diagnosis Date    CHF (congestive heart failure)     Hyperlipidemia     Hypertension        Past Surgical History:   Procedure Laterality Date    Heart Cath-Bilateral Bilateral 2018    Performed by Shailesh Eng MD at [facility] CATH LAB    TESTICLE SURGERY         Review of patient's allergies indicates:  No Known Allergies    No current facility-administered medications on file prior to encounter.      Current Outpatient Medications on File Prior to Encounter   Medication Sig    aspirin 325 MG tablet Take 325 mg by mouth 2 (two) times daily.     carvedilol (COREG) 6.25 MG tablet Take 1 tablet (6.25 mg total) by mouth 2 (two) times daily with meals.    furosemide (LASIX) 40 MG tablet Take 1.5 tablets (60 mg total) by mouth 2 (two) times daily.    ketorolac (TORADOL) 10 mg tablet Take 1 tablet (10 mg total) by mouth every 6 (six) hours.    pravastatin (PRAVACHOL) 80 MG tablet Take 1 tablet (80 mg total) by mouth once daily.    sacubitril-valsartan (ENTRESTO) 49-51 mg per tablet Take 1 tablet by mouth 2 (two) times daily.    spironolactone (ALDACTONE) 25 MG tablet Take 1 tablet (25 mg total) by mouth once daily.    [DISCONTINUED] traMADol (ULTRAM) 50 mg tablet Take 1 tablet (50 mg total) by mouth every 6 (six) hours as needed for Pain.     Family History     Problem Relation (Age of Onset)    Epilepsy Mother        Tobacco Use    Smoking status: Former Smoker     Packs/day: 0.50     Years: 40.00     Pack years: 20.00     Types: Cigarettes, Cigars     Last attempt to quit: 2014     Years since quittin.8    Smokeless tobacco: Never Used   Substance and Sexual Activity    Alcohol use: Yes     Comment: once a month    Drug use: No    Sexual activity: Yes     Birth control/protection: None     Comment: uses protection sometimes     Review of Systems   Constitutional: Positive for activity change and fatigue. Negative for chills, diaphoresis, fever and unexpected weight  change.   HENT: Negative.    Eyes: Negative.    Respiratory: Negative.    Cardiovascular: Negative.    Gastrointestinal: Positive for abdominal distention. Negative for nausea and vomiting.   Endocrine: Negative.    Genitourinary: Negative.    Musculoskeletal: Positive for arthralgias.   Allergic/Immunologic: Negative.    Neurological: Negative.    Hematological: Negative.    Psychiatric/Behavioral: Negative.    Objective:     Vital Signs (Most Recent):  Temp: 97.9 °F (36.6 °C) (11/04/18 1537)  Pulse: 71 (11/04/18 1537)  Resp: 16 (11/04/18 1537)  BP: 134/75 (11/04/18 1537)  SpO2: 96 % (11/04/18 1537) Vital Signs (24h Range):  Temp:  [97.4 °F (36.3 °C)-98 °F (36.7 °C)] 97.9 °F (36.6 °C)  Pulse:  [67-84] 71  Resp:  [14-20] 16  SpO2:  [95 %-100 %] 96 %  BP: (116-138)/(75-92) 134/75     Weight: (!) 148.3 kg (327 lb)  Body mass index is 38.78 kg/m².    Physical Exam   Constitutional: He is oriented to person, place, and time. He appears well-developed and well-nourished.   HENT:   Head: Atraumatic.   Eyes: EOM are normal.   Neck: Neck supple.   Pulmonary/Chest: He is in respiratory distress. He has wheezes (mild scattered wheezes).   Inspiratory crackles through out.    Abdominal: Soft. Bowel sounds are normal. He exhibits distension. There is no tenderness.   Musculoskeletal: He exhibits edema (R>L (right leg chronic edema)).   Neurological: He is alert and oriented to person, place, and time.   Skin: Skin is warm and dry.   Psychiatric: He has a normal mood and affect.         CRANIAL NERVES     CN III, IV, VI   Extraocular motions are normal.        Significant Labs: All pertinent labs within the past 24 hours have been reviewed.    Significant Imaging: I have reviewed and interpreted all pertinent imaging results/findings within the past 24 hours.

## 2018-11-04 NOTE — ED PROVIDER NOTES
"Encounter Date: 2018       History     Chief Complaint   Patient presents with    Shortness of Breath     Pt states," I have been short of breath since last night. I think I have extra fluid on my body."     Chief complaint:  Shortness of breath  60-year-old complains of progressive shortness of breath for the last week.  Shortness of breath worsens when he walks or lays flat.  He does have a history of congested heart failure  Patient is having to sleep t the on pillows.  He has chronic swelling to the right leg that has not worsened.  Patient says that he takes 2-3 fluid pills  at a time without improvement.  He denies chest pain.  He has had a cough productive of white sputum.      The history is provided by the patient and medical records.     Review of patient's allergies indicates:  No Known Allergies  Past Medical History:   Diagnosis Date    CHF (congestive heart failure)     Hyperlipidemia     Hypertension      Past Surgical History:   Procedure Laterality Date    Heart Cath-Bilateral Bilateral 2018    Performed [facility] at [facility] CATH LAB    TESTICLE SURGERY       Family History   Problem Relation Age of Onset    Epilepsy Mother      Social History     Tobacco Use    Smoking status: Former Smoker     Packs/day: 0.50     Years: 40.00     Pack years: 20.00     Types: Cigarettes, Cigars     Last attempt to quit:      Years since quittin.8    Smokeless tobacco: Never Used   Substance Use Topics    Alcohol use: Yes     Comment: once a month    Drug use: No     Review of Systems   Constitutional: Negative for fever.   HENT: Positive for congestion. Negative for sore throat.    Respiratory: Positive for cough and shortness of breath.    Cardiovascular: Positive for leg swelling (Chronic, right). Negative for chest pain.   Gastrointestinal: Negative for nausea.   Genitourinary: Negative for dysuria.   Musculoskeletal: Negative for back pain.   Skin: Negative for rash. "   Neurological: Negative for weakness.   Hematological: Does not bruise/bleed easily.       Physical Exam     Initial Vitals [11/04/18 0739]   BP Pulse Resp Temp SpO2   (!) 131/90 83 20 98 °F (36.7 °C) 95 %      MAP       --         Physical Exam    Constitutional: He appears well-developed and well-nourished.   HENT:   Head: Normocephalic and atraumatic.   Eyes: EOM are normal. Pupils are equal, round, and reactive to light.   Neck: Neck supple.   Cardiovascular: Normal rate, regular rhythm and normal heart sounds.   Pulmonary/Chest: He has rhonchi in the right lower field and the left lower field.   Mildly short of breath   Abdominal: Soft. There is no tenderness. There is no rebound and no guarding.   Musculoskeletal: Normal range of motion.        Right lower leg: He exhibits edema (Chronic).   Neurological: He is alert and oriented to person, place, and time. No cranial nerve deficit.   Skin: Skin is warm and dry. Capillary refill takes less than 2 seconds.   Psychiatric: He has a normal mood and affect.         ED Course   Procedures  Labs Reviewed   POCT CBC   POCT CMP   POCT B-TYPE NATRIURETIC PEPTIDE (BNP)   POCT TROPONIN   POCT D DIMER     EKG Readings: (Independently Interpreted)   Initial Reading: No STEMI. Previous EKG: Compared with most recent EKG Previous EKG Date: December 2017. Conduction: LBBB.   Normal sinus rhythm, heart rate 66, left bundle branch block (chronic), normal QT, normal axis       Imaging Results          X-Ray Chest PA And Lateral (Final result)  Result time 11/04/18 08:31:32    Final result by Farrukh Hough III, MD (11/04/18 08:31:32)                 Impression:      See above    Pulmonary edema versus pneumonia.      Electronically signed by: Farrukh Hough MD  Date:    11/04/2018  Time:    08:31             Narrative:    EXAMINATION:  XR CHEST PA AND LATERAL    CLINICAL HISTORY:  Shortness of breath    FINDINGS:  Two views: There is cardiomegaly mild-moderate edema, and no  prior.                                 Medical Decision Making:   Initial Assessment:   63-year-old with a history of congestive heart failure complains of progressive shortness of breath for the last week.  On exam patient is not in severe respiratory distress does appear slightly uncomfortable.  He has bibasilar crackles.  He is speaking in complete sentences  ED Management:   Patient was given 40 mg of Lasix.  He did urinate.  Chest x-ray shows pulmonary edema.  Labs were also consistent with pulmonary edema with elevated BNP.  Troponin is negative.  Patient was placed in observation for continued diuresis excepted by Dr. Jim Fernandez                      Clinical Impression:   The primary encounter diagnosis was Acute on chronic congestive heart failure, unspecified heart failure type. A diagnosis of SOB (shortness of breath) was also pertinent to this visit.                             Angelia Ervin MD  11/04/18 1129

## 2018-11-04 NOTE — HPI
62 yo AAM with significant history for hypertension, hyperlipidemia, chronic LLE edema due to venous statsis, chronic systolic diastolic heart failure EF 15% and ?AMELIA who presents to [place] ED for shortness of breath that have progressively worse since Monday (7 days ago) then transferred to [facility] for further evaluation of acute on chronic combined heart failure. He initially felt symptoms were due to a cold. Denies chest pain but can feel fluid in lungs. Productive cough with white sputum. Denies headaches, dizziness, syncope, change in vision, palpitations, diaphoresis, abdominal pain, nausea, vomiting, or focal extremities weakness/numbness. Patient compliance with low salt diet and diuretics spirolactone 25 d, lasix 60 BID. Patient took additional lasix pill without relief. Gain 7 lbs 325-332. Currently, 85% on RA at rest and not able to speak in full sentences.     2/1/18 [facility] normal coronary arteries and pulmonary htn. Patient was seen by   Cardiologist Dr. Cain on 5/10/18 and patient declined sleep study and ICD at that time.

## 2018-11-04 NOTE — ASSESSMENT & PLAN NOTE
EF 15% 9 months ago. No syncope or presyncope. Patient is followed by Cardiologist Dr. Eng. Reports compliance with medication and diuretics. CXR pulmonary edema. . Minimal relief with IV lasix 40 mg at [facility]. Currently, 85% on RA at rest and not able to speak in full sentences.    Start lasix 100 mg IV BID. Continue spirolactone, entresto, coreg. Strict IO. Repeat 2 D echo.

## 2018-11-05 VITALS
TEMPERATURE: 99 F | BODY MASS INDEX: 37.19 KG/M2 | RESPIRATION RATE: 18 BRPM | WEIGHT: 315 LBS | HEIGHT: 77 IN | HEART RATE: 58 BPM | OXYGEN SATURATION: 95 % | SYSTOLIC BLOOD PRESSURE: 112 MMHG | DIASTOLIC BLOOD PRESSURE: 69 MMHG

## 2018-11-05 LAB
ALBUMIN SERPL BCP-MCNC: 3.1 G/DL
ALP SERPL-CCNC: 53 U/L
ALT SERPL W/O P-5'-P-CCNC: 18 U/L
ANION GAP SERPL CALC-SCNC: 9 MMOL/L
AORTIC ROOT ANNULUS: 3.42 CM
AORTIC VALVE CUSP SEPERATION: 2.02 CM
ASCENDING AORTA: 3.43 CM
AST SERPL-CCNC: 16 U/L
AV MEAN GRADIENT: 5.1 MMHG
AV PEAK GRADIENT: 8.22 MMHG
AV VALVE AREA: 2.87 CM2
BASOPHILS # BLD AUTO: 0.02 K/UL
BASOPHILS NFR BLD: 0.4 %
BILIRUB SERPL-MCNC: 1.6 MG/DL
BSA FOR ECHO PROCEDURE: 2.84 M2
BUN SERPL-MCNC: 17 MG/DL
CALCIUM SERPL-MCNC: 9.1 MG/DL
CHLORIDE SERPL-SCNC: 103 MMOL/L
CO2 SERPL-SCNC: 27 MMOL/L
CREAT SERPL-MCNC: 1.2 MG/DL
CV ECHO LV RWT: 0.33 CM
DIFFERENTIAL METHOD: ABNORMAL
DOP CALC AO PEAK VEL: 1.43 M/S
DOP CALC AO VTI: 24.82 CM
DOP CALC LVOT AREA: 4.52 CM2
DOP CALC LVOT DIAMETER: 2.4 CM
DOP CALC LVOT STROKE VOLUME: 71.12 CM3
DOP CALCLVOT PEAK VEL VTI: 15.73 CM
E WAVE DECELERATION TIME: 146.03 MSEC
E/A RATIO: 2.81
E/E' RATIO: 12.42
ECHO LV POSTERIOR WALL: 1.15 CM (ref 0.6–1.1)
EOSINOPHIL # BLD AUTO: 0.2 K/UL
EOSINOPHIL NFR BLD: 3.2 %
ERYTHROCYTE [DISTWIDTH] IN BLOOD BY AUTOMATED COUNT: 14.4 %
EST. GFR  (AFRICAN AMERICAN): >60 ML/MIN/1.73 M^2
EST. GFR  (NON AFRICAN AMERICAN): >60 ML/MIN/1.73 M^2
FRACTIONAL SHORTENING: 11 % (ref 28–44)
GLUCOSE SERPL-MCNC: 120 MG/DL
HCT VFR BLD AUTO: 39.6 %
HGB BLD-MCNC: 12.5 G/DL
INTERVENTRICULAR SEPTUM: 1.18 CM (ref 0.6–1.1)
IVRT: 0.09 MSEC
LA MAJOR: 6.77 CM
LA MINOR: 6.36 CM
LA WIDTH: 5.23 CM
LEFT ATRIUM SIZE: 5.85 CM
LEFT ATRIUM VOLUME INDEX: 60.1 ML/M2
LEFT ATRIUM VOLUME: 170.56 CM3
LEFT INTERNAL DIMENSION IN SYSTOLE: 6.19 CM (ref 2.1–4)
LEFT VENTRICLE DIASTOLIC VOLUME INDEX: 89.16 ML/M2
LEFT VENTRICLE DIASTOLIC VOLUME: 253.21 ML
LEFT VENTRICLE MASS INDEX: 136.7 G/M2
LEFT VENTRICLE SYSTOLIC VOLUME INDEX: 68 ML/M2
LEFT VENTRICLE SYSTOLIC VOLUME: 193.03 ML
LEFT VENTRICULAR INTERNAL DIMENSION IN DIASTOLE: 6.97 CM (ref 3.5–6)
LEFT VENTRICULAR MASS: 388.1 G
LV LATERAL E/E' RATIO: 11.8
LV SEPTAL E/E' RATIO: 13.11
LYMPHOCYTES # BLD AUTO: 1.4 K/UL
LYMPHOCYTES NFR BLD: 27.2 %
MCH RBC QN AUTO: 25.3 PG
MCHC RBC AUTO-ENTMCNC: 31.6 G/DL
MCV RBC AUTO: 80 FL
MONOCYTES # BLD AUTO: 0.7 K/UL
MONOCYTES NFR BLD: 13.7 %
MV PEAK A VEL: 0.42 M/S
MV PEAK E VEL: 1.18 M/S
MV STENOSIS PRESSURE HALF TIME: 42.35 MS
MV VALVE AREA P 1/2 METHOD: 5.19 CM2
NEUTROPHILS # BLD AUTO: 2.8 K/UL
NEUTROPHILS NFR BLD: 55.7 %
PISA TR MAX VEL: 2.44 M/S
PLATELET # BLD AUTO: 153 K/UL
PLATELET BLD QL SMEAR: ABNORMAL
PMV BLD AUTO: 12.2 FL
POTASSIUM SERPL-SCNC: 3.7 MMOL/L
PROT SERPL-MCNC: 6.8 G/DL
PV PEAK GRADIENT: 4.71 MMHG
PV PEAK VELOCITY: 1.09 CM/S
RA MAJOR: 5.39 CM
RA PRESSURE: 8 MMHG
RA WIDTH: 4.82 CM
RBC # BLD AUTO: 4.95 M/UL
RETIRED EF AND QEF - SEE NOTES: 23.77 %
RIGHT VENTRICULAR END-DIASTOLIC DIMENSION: 5.41 CM
SINUS: 3.73 CM
SODIUM SERPL-SCNC: 139 MMOL/L
STJ: 2.46 CM
TDI LATERAL: 0.1
TDI SEPTAL: 0.09
TDI: 0.1
TR MAX PG: 23.81 MMHG
TV REST PULMONARY ARTERY PRESSURE: 31.81 MMHG
WBC # BLD AUTO: 5.03 K/UL

## 2018-11-05 PROCEDURE — 25000003 PHARM REV CODE 250: Mod: HCNC | Performed by: NURSE PRACTITIONER

## 2018-11-05 PROCEDURE — 90686 IIV4 VACC NO PRSV 0.5 ML IM: CPT | Mod: HCNC | Performed by: INTERNAL MEDICINE

## 2018-11-05 PROCEDURE — 99900035 HC TECH TIME PER 15 MIN (STAT): Mod: HCNC

## 2018-11-05 PROCEDURE — 3E02340 INTRODUCTION OF INFLUENZA VACCINE INTO MUSCLE, PERCUTANEOUS APPROACH: ICD-10-PCS | Performed by: INTERNAL MEDICINE

## 2018-11-05 PROCEDURE — 94640 AIRWAY INHALATION TREATMENT: CPT | Mod: HCNC

## 2018-11-05 PROCEDURE — 25000242 PHARM REV CODE 250 ALT 637 W/ HCPCS: Mod: HCNC | Performed by: NURSE PRACTITIONER

## 2018-11-05 PROCEDURE — 63600175 PHARM REV CODE 636 W HCPCS: Mod: HCNC | Performed by: INTERNAL MEDICINE

## 2018-11-05 PROCEDURE — 80053 COMPREHEN METABOLIC PANEL: CPT | Mod: HCNC

## 2018-11-05 PROCEDURE — G0008 ADMIN INFLUENZA VIRUS VAC: HCPCS | Mod: HCNC | Performed by: INTERNAL MEDICINE

## 2018-11-05 PROCEDURE — 90471 IMMUNIZATION ADMIN: CPT | Mod: HCNC | Performed by: INTERNAL MEDICINE

## 2018-11-05 PROCEDURE — 63600175 PHARM REV CODE 636 W HCPCS: Mod: HCNC | Performed by: NURSE PRACTITIONER

## 2018-11-05 PROCEDURE — 94761 N-INVAS EAR/PLS OXIMETRY MLT: CPT | Mod: HCNC

## 2018-11-05 PROCEDURE — 85025 COMPLETE CBC W/AUTO DIFF WBC: CPT | Mod: HCNC

## 2018-11-05 PROCEDURE — 27000221 HC OXYGEN, UP TO 24 HOURS: Mod: HCNC

## 2018-11-05 PROCEDURE — 36415 COLL VENOUS BLD VENIPUNCTURE: CPT | Mod: HCNC

## 2018-11-05 RX ORDER — FUROSEMIDE 80 MG/1
80 TABLET ORAL 2 TIMES DAILY
Qty: 60 TABLET | Refills: 1 | Status: SHIPPED | OUTPATIENT
Start: 2018-11-05 | End: 2018-12-03 | Stop reason: SDUPTHER

## 2018-11-05 RX ADMIN — IPRATROPIUM BROMIDE AND ALBUTEROL SULFATE 3 ML: .5; 2.5 SOLUTION RESPIRATORY (INHALATION) at 12:11

## 2018-11-05 RX ADMIN — SPIRONOLACTONE 25 MG: 25 TABLET ORAL at 08:11

## 2018-11-05 RX ADMIN — PRAVASTATIN SODIUM 80 MG: 40 TABLET ORAL at 08:11

## 2018-11-05 RX ADMIN — FUROSEMIDE 80 MG: 10 INJECTION, SOLUTION INTRAMUSCULAR; INTRAVENOUS at 05:11

## 2018-11-05 RX ADMIN — ASPIRIN 325 MG ORAL TABLET 325 MG: 325 PILL ORAL at 08:11

## 2018-11-05 RX ADMIN — CARVEDILOL 6.25 MG: 6.25 TABLET, FILM COATED ORAL at 08:11

## 2018-11-05 RX ADMIN — FUROSEMIDE 80 MG: 10 INJECTION, SOLUTION INTRAMUSCULAR; INTRAVENOUS at 08:11

## 2018-11-05 RX ADMIN — INFLUENZA A VIRUS A/MICHIGAN/45/2015 X-275 (H1N1) ANTIGEN (FORMALDEHYDE INACTIVATED), INFLUENZA A VIRUS A/SINGAPORE/INFIMH-16-0019/2016 IVR-186 (H3N2) ANTIGEN (FORMALDEHYDE INACTIVATED), INFLUENZA B VIRUS B/PHUKET/3073/2013 ANTIGEN (FORMALDEHYDE INACTIVATED), AND INFLUENZA B VIRUS B/MARYLAND/15/2016 BX-69A ANTIGEN (FORMALDEHYDE INACTIVATED) 0.5 ML: 15; 15; 15; 15 INJECTION, SUSPENSION INTRAMUSCULAR at 05:11

## 2018-11-05 RX ADMIN — SACUBITRIL AND VALSARTAN 2 TABLET: 24; 26 TABLET, FILM COATED ORAL at 08:11

## 2018-11-05 RX ADMIN — IPRATROPIUM BROMIDE AND ALBUTEROL SULFATE 3 ML: .5; 2.5 SOLUTION RESPIRATORY (INHALATION) at 11:11

## 2018-11-05 NOTE — NURSING
Report received from LAUREN Link. Patient care assumed. Patient observed lying in bed with cardiac monitoring in progress. Vital signs stable and found in flow sheets with complete patient assessment. Skin dry and bilateral lower extremities noted dry and flaky. No complaints of pain and no signs of respiratory distress noted. Call light and urinal in reach and patient instructed to inform the nurse if anything is needed. Patient stable and will continue to be monitored.

## 2018-11-05 NOTE — PLAN OF CARE
"   11/05/18 1704   Final Note   Assessment Type Final Discharge Note   Anticipated Discharge Disposition Home   What phone number can be called within the next 1-3 days to see how you are doing after discharge? ([phone])   Hospital Follow Up  Appt(s) scheduled? Yes   Right Care Referral Info   Post Acute Recommendation No Care   EDUCATION:  Mr Luna provided with educational information on CHF.  Information reviewed and placed in :My Healthcare Packet" to be brought home for him to use as resource after discharge.  Information included:  signs and symptoms to look for and call the doctor if experiencing, and symptoms that may indicate a medical emergency: CALL 911.      All questions answered.  Teach back method used.    Patient stated, " I will weigh myself daily so that I can call Dr Eng if I have gained 3 or more pounds over night ".      NurseBrenna notified that pt ok to DC from a CM standpoint  "

## 2018-11-05 NOTE — PLAN OF CARE
Problem: Fall Risk (Adult)  Goal: Identify Related Risk Factors and Signs and Symptoms  Related risk factors and signs and symptoms are identified upon initiation of Human Response Clinical Practice Guideline (CPG)  Outcome: Ongoing (interventions implemented as appropriate)   11/05/18 1507   Fall Risk   Related Risk Factors (Fall Risk) culprit medication(s);polypharmacy   Signs and Symptoms (Fall Risk) presence of risk factors       Problem: Patient Care Overview  Goal: Plan of Care Review  Outcome: Ongoing (interventions implemented as appropriate)   11/05/18 1507   Coping/Psychosocial   Plan Of Care Reviewed With patient;daughter       Problem: Cardiac: Heart Failure (Adult)  Goal: Signs and Symptoms of Listed Potential Problems Will be Absent, Minimized or Managed (Cardiac: Heart Failure)  Signs and symptoms of listed potential problems will be absent, minimized or managed by discharge/transition of care (reference Cardiac: Heart Failure (Adult) CPG).  Outcome: Ongoing (interventions implemented as appropriate)   11/05/18 1507   Cardiac: Heart Failure   Problems Assessed (Heart Failure) fluid/electrolyte imbalance;situational response   Problems Present (Heart Failure) fluid/electrolyte imbalance;situational response

## 2018-11-05 NOTE — PLAN OF CARE
To patient's room to discuss patient managing her care at home.      TN Role Explained.  Patient identified by using 2 identifiers:  Name and date of birth    Patient stated that  His daughter WILL HELP AT HOME WITH his  RECOVERY.      TN name and contact info placed on the communication board    Preferred Pharmacy:  [facility]     11/05/18 3943   Discharge Assessment   Assessment Type Discharge Planning Assessment   Confirmed/corrected address and phone number on facesheet? Yes   Assessment information obtained from? Patient   Expected Length of Stay (days) (discharged)   Communicated expected length of stay with patient/caregiver yes   Prior to hospitilization cognitive status: Alert/Oriented   Prior to hospitalization functional status: Independent   Current cognitive status: Alert/Oriented   Lives With child(sourav), adult   Able to Return to Prior Arrangements yes   Is patient able to care for self after discharge? Yes   Patient's perception of discharge disposition home or selfcare   Readmission Within The Last 30 Days no previous admission in last 30 days   Patient currently being followed by outpatient case management? No   Patient currently receives any other outside agency services? No   Equipment Currently Used at Home none   Do you have any problems affording any of your prescribed medications? No   Is the patient taking medications as prescribed? yes   Does the patient have transportation home? Yes   Transportation Available family or friend will provide   Does the patient receive services at the Coumadin Clinic? No   Discharge Plan A Home with family   Patient/Family In Agreement With Plan yes

## 2018-11-06 NOTE — HOSPITAL COURSE
Mr. Melgoza is an AAF male who was admitted to hospital for acute on chronic combined systolic diastolic heart failure. D dimer negative. Per chart review, 2/1/18 [facility] normal coronary arteries and pulmonary htn. Patient was seen by   Cardiologist Dr. Cain on 5/10/18 and patient declined sleep study and ICD at that time. IO not accurate in chart. UOP approximately 6 L with IV diuresis. Lasix 80 IV BID.  Symptoms improved and RA O2 sat 95%. Repeat 2 D echo showed biventricular HF  EF 20% (previously 15%) and PAP 31.81 mmHg. Increase home lasix 60 to 80 mg BID. Instruct to stop ketorolac as contributing so sodium fluid retentions

## 2018-11-06 NOTE — DISCHARGE SUMMARY
[facility] Hospital Medicine  Discharge Summary      Patient Name: Papito Bhakta  MRN: [id number]  Admission Date: 11/4/2018  Hospital Length of Stay: 1 11/5/18  Attending Physician: No att. providers found   Discharging Provider: Darlin Baker NP  Primary Care Provider: Brynn To MD      HPI:   64 yo AAM with significant history for hypertension, hyperlipidemia, chronic LLE edema due to venous statsis, chronic systolic diastolic heart failure EF 15% and ?AMELIA who presents to [place] ED for shortness of breath that have progressively worse since Monday (7 days ago) then transferred to [facility] for further evaluation of acute on chronic combined heart failure. He initially felt symptoms were due to a cold. Denies chest pain but can feel fluid in lungs. Productive cough with white sputum. Denies headaches, dizziness, syncope, change in vision, palpitations, diaphoresis, abdominal pain, nausea, vomiting, or focal extremities weakness/numbness. Patient compliance with low salt diet and diuretics spirolactone 25 d, lasix 60 BID. Patient took additional lasix pill without relief. Gain 7 lbs 325-332. Currently, 85% on RA at rest and not able to speak in full sentences.     2/1/18 [facility] normal coronary arteries and pulmonary htn. Patient was seen by   Cardiologist Dr. Cain on 5/10/18 and patient declined sleep study and ICD at that time.     * No surgery found *      Hospital Course:   Mr. Melgoza is an AAF male who was admitted to hospital for acute on chronic combined systolic diastolic heart failure. D dimer negative. Per chart review, 2/1/18 [facility] normal coronary arteries and pulmonary htn and seen by Cardiologist Dr. Cain on 5/10/18 and patient declined sleep study and ICD at that time. IO not accurate in chart. UOP approximately 6 L with IV diuresis. Lasix 80 IV BID.  Symptoms improved and RA O2 sat 95%. Repeat 2 D echo showed biventricular HF  EF 20% (previously 15%) and PAP 31.81 mmHg.  Increase home lasix 60 to 80 mg BID. Instruct to stop ketorolac as contributing so sodium fluid retentions and log daily weights. RFP on Thursday 11/8. Patient stable for discharge with close follow up PCP and Cardiologist.      Consults:     Essential hypertension    Controlled. Continue antihypertensives above.          Final Active Diagnoses:    Diagnosis Date Noted POA    PRINCIPAL PROBLEM:  Acute on chronic congestive heart failure [I50.9] 11/04/2018 Yes    Essential hypertension [I10] 12/01/2017 Yes      Problems Resolved During this Admission:       Discharged Condition: fair    Disposition: Home or Self Care    Follow Up:  Follow-up Information     Shailesh Eng MD. Go on 11/12/2018.    Specialties:  INTERVENTIONAL CARDIOLOGY, Cardiology  Why:  Outpatient Services, please arrive at 1:45 PM   Contact information:  [facility]  [place] [address] [phone]                 Patient Instructions:      Diet Cardiac     Activity as tolerated         Pending Diagnostic Studies:     None         Medications:  Reconciled Home Medications:      Medication List      CHANGE how you take these medications    furosemide 80 MG tablet  Commonly known as:  LASIX  Take 1 tablet (80 mg total) by mouth 2 (two) times daily.  What changed:    · medication strength  · how much to take        CONTINUE taking these medications    aspirin 325 MG tablet  Take 325 mg by mouth 2 (two) times daily.     carvedilol 6.25 MG tablet  Commonly known as:  COREG  Take 1 tablet (6.25 mg total) by mouth 2 (two) times daily with meals.     ENTRESTO 49-51 mg per tablet  Generic drug:  sacubitril-valsartan  Take 1 tablet by mouth 2 (two) times daily.     pravastatin 80 MG tablet  Commonly known as:  PRAVACHOL  Take 1 tablet (80 mg total) by mouth once daily.     spironolactone 25 MG tablet  Commonly known as:  ALDACTONE  Take 1 tablet (25 mg total) by mouth once daily.        STOP taking these medications    ketorolac 10 mg  tablet  Commonly known as:  TORADOL     traMADol 50 mg tablet  Commonly known as:  ULTRAM            Indwelling Lines/Drains at time of discharge:   Lines/Drains/Airways          None          Time spent on the discharge of patient: 30 minutes  Patient was seen and examined on the date of discharge and determined to be suitable for discharge.         Darlin Baker NP  Department of Hospital Medicine  [facility]

## 2018-11-07 ENCOUNTER — PATIENT OUTREACH (OUTPATIENT)
Dept: ADMINISTRATIVE | Facility: CLINIC | Age: 63
End: 2018-11-07

## 2018-11-07 ENCOUNTER — NURSE TRIAGE (OUTPATIENT)
Dept: ADMINISTRATIVE | Facility: CLINIC | Age: 63
End: 2018-11-07

## 2018-11-07 NOTE — PROGRESS NOTES
C3 nurse attempted to contact patient. No answer. The following message was left for the patient to return the call:  Good morning, I am a nurse calling on behalf of [facility] from the [facility].  This is a Transitional Care Call for Papito Jaimeslveen. When you have a moment please contact us at [phone] or [phone] Monday through Friday, between the hours of 8 am to 4 pm. We look forward to speaking with you. On behalf of [facility] have a nice day.    The patient does not have a scheduled HOSFU appointment within 7 days post hospital discharge date 11\5. Message sent to Physician staff to assist with HOSFU appointment scheduling.

## 2018-11-07 NOTE — TELEPHONE ENCOUNTER
"    Reason for Disposition   [1] Follow-up call to recent contact AND [2] information only call, no triage required    Answer Assessment - Initial Assessment Questions  1. REASON FOR CALL or QUESTION: "What is your reason for calling today?" or "How can I best help you?" or "What question do you have that I can help answer?"      Returning phone call from Ashley with [facility]    Protocols used: ST INFORMATION ONLY CALL-A-      "

## 2018-11-07 NOTE — PATIENT INSTRUCTIONS
Discharge Instructions for Heart Failure  The heart is a muscle that pumps oxygen-rich blood to all parts of the body. When you have heart failure, the heart is not able to pump as well as it should. Blood and fluid may back up into the lungs (congestive heart failure), and some parts of the body dont get enough oxygen-rich blood to work normally. These problems lead to the symptoms of heart failure. Heart failure can occur due to an injury to the heart or from natural processes.  You can control symptoms of heart failure with some lifestyle changes and by following your doctor's advice.  Home care  Activity  Ask your healthcare provider about an exercise program. You can benefit from simple activities such as walking or gardening. Exercising most days of the week can make you feel better. Don't be discouraged if your progress is slow at first. Rest as needed. Stop activity if you develop symptoms such as chest pain, lightheadedness, or significant shortness of breath. Find activities that you enjoy, such as brisk walking, dancing, swimming, or gardening. These will help you stay active and strengthen your heart.  Diet  Follow a heart healthy diet. And make sure to limit the salt (sodium) in your diet. Salt causes your body to hold water. This makes your heart work harder as there is more fluid for the heart to pump. Limit your salt by doing the following:  · Limit canned, dried, packaged, and fast foods.  · Don't add salt to your food.  · Season foods with herbs instead of salt.  · Watch how much liquids you drink. Drinking too much can make heart failure worse. Talk with your health care provider about how much you should drink each day.  · Limit the amount of alcohol you drink. It may harm your heart. Women should have no more than 1 drink a day and men should have no more than 2.  · When you eat out, request that your meals have no added salt.  Tobacco  If you smoke, it's very important to quit. Smoking  increases your chances of having a heart attack by harming the blood vessels that provide oxygen to your heart. This makes heart failure worse. Quitting smoking is the number one thing you can do to improve your health. Enroll in a stop-smoking program to improve your chances of success. Talk with your healthcare provider about medicines or nicotine replacement therapy to help you quit smoking. Ask your healthcare provider about smoking cessation support groups.  Medicine  Take your medicines exactly as prescribed. Learn the names and purpose of each of your medicines. Keep an accurate medicine list and current dosages with you at all times. Don't skip doses. If you miss a dose of your medicine, take it as soon as you remember. If you miss a dose and it's almost time for your next dose, just wait and take your next dose at the normal time. Don't take a double dose. If you are unsure, call your doctor's office. Make sure not to mix up your medicines or forget what you've taken the same day.  Weight monitoring  Weigh yourself every day. A sudden weight gain can mean your heart failure is getting worse. Weigh yourself at the same time of day and in the same kind of clothes. Ideally, weigh yourself first thing in the morning after you empty your bladder, but before you eat breakfast. Your healthcare provider will show you how to track your weight. He or she will also discuss with you when you should call if you have a sudden, unexpected increase in your weight.  In general, your healthcare provider may ask you to report if your weight goes up by more than 2 pounds in 1 day,  5 pounds in 1 week, or whatever weight gain you were told by your doctor. This is a sign that you are retaining more fluid than you should be. Clues to weight gain include checking your ankles for swelling, or noticing you are short of breath when you lie down.  Follow-up care  Make a follow-up appointment as directed. Depending on the type and  severity of heart failure you have, you may need follow-up as early as 7 days from hospital discharge. Keep appointments for checkups and lab tests that are needed to check your medicines and condition.  Recognize that your health and even survival depend on your following medical recommendations.  Symptoms  Heart failure can cause a variety of symptoms, including:  · Shortness of breath  · Trouble breathing at night, especially when you lie down  · Swelling in the legs and feet or in the belly (abdomen)  · Becoming easily fatigued  · Irregular or rapid heartbeat  · Weakness or lightheadedness  · Swelling of the neck veins  It is important to know what to do if symptoms get worse or if you develop signs of worsening heart failure.     When to see your healthcare provider  Call your doctor right away if you have any of these signs of worsening heart failure:  · Sudden weight gain (more than 2 pounds in 1 day or 5 pounds in 1 week, or whatever weight gain you were told to report by your doctor)  · Trouble breathing not related to being active  · New or increased swelling of your legs or ankles  · Swelling or pain in your abdomen  · Breathing trouble at night (waking up short of breath, needing more pillows to breathe)  · Frequent coughing that doesn't go away  · Feeling much more tired than usual  Call 911  Call 911 right away if you have:  · Severe shortness of breath, such that you can't catch your breath even while resting  · Severe chest pain that does not resolve with rest or nitroglycerin  · Pink, foamy mucus with cough and shortness of breath  · A continuous rapid or irregular heartbeat  · Passing out or fainting  · Stroke symptoms such as sudden numbness or weakness on one side of your face, arm, or leg or sudden confusion, trouble speaking or vision changes   Date Last Reviewed: 3/21/2016  © 3502-9556 [employer]. [address] [place] [address], [place] [address]. All rights reserved. This information  is not intended as a substitute for professional medical care. Always follow your healthcare professional's instructions.        Taking a Diuretic  Your healthcare provider has prescribed a diuretic, or water pill, to help your body get rid of excess water and salt and maintain appropriate fluid balance. Taking your diuretic can help you feel better, breathe better, move more easily, and have more energy.     Take your medication early in the day at the same time each day.      The name of my diuretic is:     ________________________________________   Medicine tips  · Read the fact sheet that comes with your medicine. It tells you when and how to take it. Ask for a medicine sheet if you dont get one.  · If you take 2 or more doses each day, take the last one before dinner if you can. That way youll get up fewer times during the night to go to the bathroom. However, make sure you have enough time between doses during the day.   · If you miss a dose, take it as soon as you remember. If it is almost time for the next dose, skip the missed dose. Do not take a double dose.  · If you miss 2 or more consecutive doses, call your healthcare provider. You may be at risk for fluid buildup.  For your safety  · Follow your doctors guidelines for potassium intake. You may need to take a potassium supplement. Or, you may need to avoid potassium supplements, salt substitutes with potassium, or large amounts of high-potassium foods (such as bananas, potatoes, broccoli, and milk). Recommendations for potassium will depend on the type of diuretic you are prescribed along with your kidney function and other factors. Your healthcare provider will likely want to check your potassium level regularly while you are taking this medicine.  · Talk to your healthcare provider about whether it is safe for you to drink alcohol while taking this medicine.  · Get up slowly when you are sitting or lying down. This helps prevent dizziness  and falls due to dizziness.  · Ask your healthcare provider or pharmacist before you take any other prescription or nonprescription medicine or herbal supplements. Some of them may interact with your diuretic and keep it from working correctly.  · Limit exposure to sunlight. A diuretic may increase your sensitivity to the sun. Even brief sun exposure may cause skin rash, itching, redness, or other discoloration. It may also lead to severe sunburn. To protect your skin do the following:  ¨ Avoid direct sunlight, especially between 10 a.m. and 2 p.m., whenever possible.  ¨ Apply a daily sunblock of at least SPF 15 (or higher) to any exposed skin, including your lips.  ¨ Wear protective clothing, such as a hat and sunglasses, when you are outdoors.  ¨ Avoid sunlamps and tanning booths.  ¨ Long-sleeve shirts and pants in the summer can help protect your skin.        When to seek medical advice  Call your healthcare provider right away if any of these occur:  · Have diarrhea, constipation, nausea or vomiting.  · Lose your appetite or notice a rapid or excessive weight gain.  · Feel extremely tired or weak.  · Have shortness of breath or difficulty breathing or swallowing.  · Have numbness or tingling in your hands, feet, or lips or a ringing in your ears.  · Feel lightheaded when getting up after sitting or lying down.  · Have headaches, blurred vision, or feel a sense of confusion.  · Have muscle cramps or joint pain.  · Have chest pains or changes in your heartbeat.  · Have an excessive thirst or a dry mouth.  · Notice a skin rash.  · Gain more than 2 pounds in 1 day or 4 pounds in 1 week (ask your healthcare provider for his or her specific direction).  · Have any other unusual symptoms.   Date Last Reviewed: 6/1/2016  © 7103-2542 [employer]. [address] [place] [address], [place] [address]. All rights reserved. This information is not intended as a substitute for professional medical care. Always follow your  healthcare professional's instructions.

## 2018-11-12 ENCOUNTER — OFFICE VISIT (OUTPATIENT)
Dept: CARDIOLOGY | Facility: CLINIC | Age: 63
End: 2018-11-12
Payer: MEDICARE

## 2018-11-12 VITALS
DIASTOLIC BLOOD PRESSURE: 72 MMHG | HEART RATE: 67 BPM | RESPIRATION RATE: 20 BRPM | BODY MASS INDEX: 36.86 KG/M2 | SYSTOLIC BLOOD PRESSURE: 108 MMHG | OXYGEN SATURATION: 97 % | WEIGHT: 310.88 LBS

## 2018-11-12 DIAGNOSIS — E66.01 CLASS 2 SEVERE OBESITY DUE TO EXCESS CALORIES WITH SERIOUS COMORBIDITY AND BODY MASS INDEX (BMI) OF 37.0 TO 37.9 IN ADULT: ICD-10-CM

## 2018-11-12 DIAGNOSIS — Z72.0 TOBACCO ABUSE: ICD-10-CM

## 2018-11-12 DIAGNOSIS — I50.42 CHRONIC COMBINED SYSTOLIC AND DIASTOLIC CONGESTIVE HEART FAILURE: Primary | ICD-10-CM

## 2018-11-12 DIAGNOSIS — E78.49 OTHER HYPERLIPIDEMIA: ICD-10-CM

## 2018-11-12 DIAGNOSIS — I10 ESSENTIAL HYPERTENSION: ICD-10-CM

## 2018-11-12 PROCEDURE — 3074F SYST BP LT 130 MM HG: CPT | Mod: CPTII,HCNC,S$GLB, | Performed by: INTERNAL MEDICINE

## 2018-11-12 PROCEDURE — 3008F BODY MASS INDEX DOCD: CPT | Mod: CPTII,HCNC,S$GLB, | Performed by: INTERNAL MEDICINE

## 2018-11-12 PROCEDURE — 99999 PR PBB SHADOW E&M-EST. PATIENT-LVL III: CPT | Mod: PBBFAC,HCNC,, | Performed by: INTERNAL MEDICINE

## 2018-11-12 PROCEDURE — 99214 OFFICE O/P EST MOD 30 MIN: CPT | Mod: HCNC,S$GLB,, | Performed by: INTERNAL MEDICINE

## 2018-11-12 PROCEDURE — 3078F DIAST BP <80 MM HG: CPT | Mod: CPTII,HCNC,S$GLB, | Performed by: INTERNAL MEDICINE

## 2018-11-12 NOTE — PROGRESS NOTES
Subjective:    Patient ID:  Papito Bhakta is a 63 y.o. male who presents for follow-up of No chief complaint on file.      HPI   previous history:  Here follow-up of systolic heart failure.  He has stable shortness of breath on heavier exertion but relieved with rest.  He denies any chest pain or palpitations.  He's express no PND, orthopnea but has stable lower extremity edema right greater than left which is chronic.  He denies any dizziness, presyncope or syncope.    Today:  Here for follow-up of nonischemic cardiomyopathy.  He recently was admitted the hospital with worsening volume overload.  He is noncompliant with his diet.  He has been taking his medicines but they had increased his Lasix.  He currently denies any PND, orthopnea but has stable lower extremity edema.  Is usually improved as well with elevation.  He denies any dizziness, presyncope or syncope.  He still refuses sleep study and defibrillator placement.      Review of Systems   Constitution: Negative.   HENT: Negative.    Eyes: Negative.    Cardiovascular: Positive for dyspnea on exertion and leg swelling. Negative for chest pain, irregular heartbeat, near-syncope, orthopnea, palpitations, paroxysmal nocturnal dyspnea and syncope.   Respiratory: Positive for shortness of breath.    Skin: Negative.    Musculoskeletal: Negative.    Gastrointestinal: Negative for abdominal pain, constipation and diarrhea.   Genitourinary: Negative for dysuria.   Neurological: Negative for dizziness.   Psychiatric/Behavioral: Negative.         Objective:    Physical Exam   Constitutional: He is oriented to person, place, and time. He appears well-developed and well-nourished. No distress.   HENT:   Head: Normocephalic and atraumatic.   Eyes: Conjunctivae and EOM are normal. Pupils are equal, round, and reactive to light.   Neck: Normal range of motion. Neck supple. No thyromegaly present.   Cardiovascular: Normal rate, regular rhythm and normal heart sounds.   No  murmur heard.  Pulmonary/Chest: Effort normal and breath sounds normal. No respiratory distress. He has no wheezes. He has no rales. He exhibits no tenderness.   Abdominal: Soft. Bowel sounds are normal.   Musculoskeletal: He exhibits edema.   Right greater than left   Neurological: He is alert and oriented to person, place, and time.   Skin: Skin is warm and dry.   Psychiatric: He has a normal mood and affect. His behavior is normal.       echo:  1-18  CONCLUSIONS     1 - Severely depressed left ventricular systolic function (EF 15-20%).     2 - Eccentric hypertrophy.     3 - Severe left ventricular enlargement.     4 - Biatrial enlargement.     5 - Restrictive LV filling pattern, indicating markedly elevated LAP (grade 3 diastolic dysfunction).     6 - Right ventricular enlargement with normal systolic function.     7 - Mild mitral regurgitation.     8 - Mild pulmonic regurgitation.     9 - Trivial pericardial effusion.     Right/left heart catheterization:  2-18  B. Summary/Post-Operative Diagnosis       Normal coronary arteries.    Systolic dysfunction.    Low right and left Filling Pressures.    Mild Pulmonary Hypertension.    ECHO:  11-18  · Left ventricle ejection fraction is severely decreased at 20%  · The left ventricle cavity is mildly dilated.  · Left ventricle shows eccentric hypertrophy.  · Grade III (severe) left ventricular diastolic dysfunction consistent with restrictive physiology.  · LA pressure is elevated.  · RV systolic function is severely reduced.  · Left atrium is severely dilated.  · Moderately elevated central venous pressure (8 mm Hg).  · The estimated PA systolic pressure is 31.81 mm Hg       Assessment:       1. Chronic combined systolic and diastolic congestive heart failure    2. Essential hypertension    3. Other hyperlipidemia    4. Tobacco abuse    5. Class 2 severe obesity due to excess calories with serious comorbidity and body mass index (BMI) of 37.0 to 37.9 in adult          Plan:       -Currently stable without any signs of central congestion, continue med therapy  -Wishes to defer sleep study and ICD placement *discussed risk/benefits and he understands and accepts  -Chronic lower extremity edema 2nd venous stasis is stable on current medicines  -okay to continue maintenance Lasix as is  -Counseled on sodium restriction and diet/exercise  -Counseled on diet and exercise    Return to clinic in 6 months

## 2018-12-03 ENCOUNTER — OFFICE VISIT (OUTPATIENT)
Dept: FAMILY MEDICINE | Facility: CLINIC | Age: 63
End: 2018-12-03
Payer: MEDICARE

## 2018-12-03 VITALS
HEART RATE: 47 BPM | OXYGEN SATURATION: 99 % | SYSTOLIC BLOOD PRESSURE: 118 MMHG | WEIGHT: 315 LBS | BODY MASS INDEX: 37.19 KG/M2 | TEMPERATURE: 98 F | DIASTOLIC BLOOD PRESSURE: 80 MMHG | HEIGHT: 77 IN | RESPIRATION RATE: 18 BRPM

## 2018-12-03 DIAGNOSIS — I50.42 CHRONIC COMBINED SYSTOLIC AND DIASTOLIC CONGESTIVE HEART FAILURE: Primary | ICD-10-CM

## 2018-12-03 DIAGNOSIS — I10 ESSENTIAL HYPERTENSION: ICD-10-CM

## 2018-12-03 DIAGNOSIS — E78.49 OTHER HYPERLIPIDEMIA: ICD-10-CM

## 2018-12-03 PROBLEM — I50.9 ACUTE ON CHRONIC CONGESTIVE HEART FAILURE: Status: RESOLVED | Noted: 2018-11-04 | Resolved: 2018-12-03

## 2018-12-03 PROCEDURE — 3079F DIAST BP 80-89 MM HG: CPT | Mod: CPTII,HCNC,S$GLB, | Performed by: FAMILY MEDICINE

## 2018-12-03 PROCEDURE — 99214 OFFICE O/P EST MOD 30 MIN: CPT | Mod: HCNC,S$GLB,, | Performed by: FAMILY MEDICINE

## 2018-12-03 PROCEDURE — 3008F BODY MASS INDEX DOCD: CPT | Mod: CPTII,HCNC,S$GLB, | Performed by: FAMILY MEDICINE

## 2018-12-03 PROCEDURE — 99999 PR PBB SHADOW E&M-EST. PATIENT-LVL III: CPT | Mod: PBBFAC,HCNC,, | Performed by: FAMILY MEDICINE

## 2018-12-03 PROCEDURE — 3074F SYST BP LT 130 MM HG: CPT | Mod: CPTII,HCNC,S$GLB, | Performed by: FAMILY MEDICINE

## 2018-12-03 RX ORDER — FUROSEMIDE 80 MG/1
80 TABLET ORAL 2 TIMES DAILY
Qty: 180 TABLET | Refills: 3 | Status: SHIPPED | OUTPATIENT
Start: 2018-12-03 | End: 2018-12-19 | Stop reason: SDUPTHER

## 2018-12-03 NOTE — PROGRESS NOTES
Chief Complaint   Patient presents with    Congestive Heart Failure    Hyperlipidemia    Follow-up       HPI  Papito Bhakta is a 63 y.o. male with multiple medical diagnoses as listed in the medical history and problem list that presents for follow-up for HTN, HLD and CHF.     He was in the hospital for an exacerbation of his heart failure. His lasix was increased to 80mg BID. Since then he has had frequent urination but has not had shortness of breath. He has had swelling in his legs if he does not elevate them. He has deferred his sleep study as he urinates too often to complete it successfully. He reports getting broken rest at night.     Hospital Course:   Mr. Melgoza is an AAF male who was admitted to hospital for acute on chronic combined systolic diastolic heart failure. D dimer negative. Per chart review, 2/1/18 [facility] normal coronary arteries and pulmonary htn and seen by Cardiologist Dr. Cain on 5/10/18 and patient declined sleep study and ICD at that time. IO not accurate in chart. UOP approximately 6 L with IV diuresis. Lasix 80 IV BID.  Symptoms improved and RA O2 sat 95%. Repeat 2 D echo showed biventricular HF  EF 20% (previously 15%) and PAP 31.81 mmHg. Increase home lasix 60 to 80 mg BID. Instruct to stop ketorolac as contributing so sodium fluid retentions and log daily weights. RFP on Thursday 11/8. Patient stable for discharge with close follow up PCP and Cardiologist.         PAST MEDICAL HISTORY:  Past Medical History:   Diagnosis Date    CHF (congestive heart failure)     Hyperlipidemia     Hypertension        PAST SURGICAL HISTORY:  Past Surgical History:   Procedure Laterality Date    Heart Cath-Bilateral Bilateral 2/1/2018    Performed by [facility]ailesh Eng [facility] at [facility] CATH LAB    TESTICLE SURGERY         SOCIAL HISTORY:  Social History     Socioeconomic History    Marital status:      Spouse name: Not on file    Number of children: Not on file    Years of education: Not on  file    Highest education level: Not on file   Social Needs    Financial resource strain: Not on file    Food insecurity - worry: Not on file    Food insecurity - inability: Not on file    Transportation needs - medical: Not on file    Transportation needs - non-medical: Not on file   Occupational History    Not on file   Tobacco Use    Smoking status: Former Smoker     Packs/day: 0.50     Years: 40.00     Pack years: 20.00     Types: Cigarettes, Cigars     Last attempt to quit:      Years since quittin.9    Smokeless tobacco: Never Used   Substance and Sexual Activity    Alcohol use: Yes     Comment: once a month    Drug use: No    Sexual activity: Yes     Birth control/protection: None     Comment: uses protection sometimes   Other Topics Concern    Not on file   Social History Narrative    Not on file       FAMILY HISTORY:  Family History   Problem Relation Age of Onset    Epilepsy Mother        ALLERGIES AND MEDICATIONS: updated and reviewed.  Review of patient's allergies indicates:  No Known Allergies  Current Outpatient Medications   Medication Sig Dispense Refill    aspirin 325 MG tablet Take 325 mg by mouth 2 (two) times daily.       carvedilol (COREG) 6.25 MG tablet Take 1 tablet (6.25 mg total) by mouth 2 (two) times daily with meals. 180 tablet 3    furosemide (LASIX) 80 MG tablet Take 1 tablet (80 mg total) by mouth 2 (two) times daily. 180 tablet 3    pravastatin (PRAVACHOL) 80 MG tablet Take 1 tablet (80 mg total) by mouth once daily. 90 tablet 3    sacubitril-valsartan (ENTRESTO) 49-51 mg per tablet Take 1 tablet by mouth 2 (two) times daily.      spironolactone (ALDACTONE) 25 MG tablet Take 1 tablet (25 mg total) by mouth once daily. 90 tablet 3     No current facility-administered medications for this visit.        ROS  Review of Systems   Constitutional: Negative for chills, fatigue, fever and unexpected weight change.   HENT: Negative for ear pain, postnasal drip,  "rhinorrhea, sinus pressure and sore throat.    Eyes: Negative for photophobia and visual disturbance.   Respiratory: Negative for apnea, cough, chest tightness, shortness of breath and wheezing.    Cardiovascular: Positive for leg swelling. Negative for chest pain and palpitations.   Gastrointestinal: Negative for abdominal pain, blood in stool, constipation, diarrhea, nausea and vomiting.   Genitourinary: Negative for difficulty urinating.   Musculoskeletal: Negative for arthralgias and joint swelling.   Skin: Negative for rash.   Neurological: Negative for facial asymmetry, speech difficulty, weakness, numbness and headaches.   Psychiatric/Behavioral: Negative for dysphoric mood.       Physical Exam  Vitals:    12/03/18 1143   BP: 118/80   Pulse: (!) 47   Resp: 18   Temp: 98 °F (36.7 °C)   TempSrc: Oral   SpO2: 99%   Weight: (!) 146.1 kg (322 lb)   Height: 6' 5" (1.956 m)    Body mass index is 38.18 kg/m².  Weight: (!) 146.1 kg (322 lb)   Height: 6' 5" (195.6 cm)     Physical Exam   Constitutional: He is oriented to person, place, and time. He appears well-developed and well-nourished.   HENT:   Head: Normocephalic and atraumatic.   Eyes: EOM are normal.   Cardiovascular: Normal rate, regular rhythm and normal heart sounds. Exam reveals no gallop and no friction rub.   No murmur heard.  Lower ext swelling > right, 2+ pitting    Pulmonary/Chest: Effort normal and breath sounds normal. No respiratory distress. He has no wheezes. He has no rales. He exhibits no tenderness.   Lymphadenopathy:     He has no cervical adenopathy.   Neurological: He is alert and oriented to person, place, and time.   Skin: Skin is warm and dry.   Psychiatric: He has a normal mood and affect. His behavior is normal.   Nursing note and vitals reviewed.      Health Maintenance       Date Due Completion Date    Zoster Vaccine 05/05/2015 ---    Lipid Panel 12/02/2022 12/2/2017    Colonoscopy 10/10/2026 10/10/2016 (Done)    Override on " 10/10/2016: Done    TETANUS VACCINE 06/06/2028 6/6/2018            ASSESSMENT     1. Chronic combined systolic and diastolic congestive heart failure    2. Essential hypertension    3. Other hyperlipidemia    4. BMI 40.0-44.9, adult        PLAN:     Problem List Items Addressed This Visit        Cardiac/Vascular    Other hyperlipidemia    Current Assessment & Plan     On statin         Essential hypertension    Current Assessment & Plan     Controlled on current regimen         Chronic combined systolic and diastolic congestive heart failure - Primary    Current Assessment & Plan     On entresto, lasix increased to 80mg BID since recent hospital visit, on statin and coreg BID         Relevant Medications    furosemide (LASIX) 80 MG tablet       Endocrine    BMI 40.0-44.9, adult  -he is trying to exercise but his capacity is limited    Overview     -The patient is asked to make an attempt to improve diet and exercise patterns to aid in medical management of this problem.                    Brynn To MD  12/03/2018 12:04 PM        Follow-up in about 3 months (around 3/3/2019) for Follow up.

## 2018-12-04 ENCOUNTER — TELEPHONE (OUTPATIENT)
Dept: FAMILY MEDICINE | Facility: CLINIC | Age: 63
End: 2018-12-04

## 2018-12-04 NOTE — TELEPHONE ENCOUNTER
----- Message from Ely De Luna sent at 12/4/2018  8:36 AM CST -----  Contact: [phone]  Pt said that he was not able to get the shingles shot at [facility], won't have it until after 1-2019. Pt is requesting to get it here. Pls call pt [phone]. Thanks......Viviana

## 2018-12-04 NOTE — TELEPHONE ENCOUNTER
Spoke with PT informed him to call his pharmacy to see where he could have his shingles vaccine without out of pocket pay.

## 2018-12-19 DIAGNOSIS — I50.42 CHRONIC COMBINED SYSTOLIC AND DIASTOLIC CONGESTIVE HEART FAILURE: ICD-10-CM

## 2018-12-19 RX ORDER — FUROSEMIDE 80 MG/1
80 TABLET ORAL 2 TIMES DAILY
Qty: 180 TABLET | Refills: 3 | Status: ON HOLD | OUTPATIENT
Start: 2018-12-19 | End: 2019-01-13 | Stop reason: SDUPTHER

## 2018-12-19 NOTE — TELEPHONE ENCOUNTER
Spoke with patient and advised prescription refill for Lasix sent to [facility] pharmacy

## 2018-12-19 NOTE — TELEPHONE ENCOUNTER
"----- Message from Flower Ramos sent at 12/19/2018  1:30 PM CST -----  Contact: Self   Med refill- [phone].        Patient is checking on med refill but says pharmacy has not received it. Asked what medications he would like refilled he says its a whole list and "she knows about it" patient did want to give further info.He said he came in on the 3rd and had a convo with  about new meds.One is lasix the 80 mg. Please call at [phone].    [facility] - [place], [place] - [id number]      "

## 2018-12-19 NOTE — TELEPHONE ENCOUNTER
Spoke with patient    States that prescription for Lasix was sent to the wrong pharmacy     He would like prescription sent to [facility]. He states that he has enough medication to last 10 days     Advised patient to contact [facility] tomorrow to confirm that prescription refill was received from office     LOV 12-03-18

## 2019-01-05 ENCOUNTER — HOSPITAL ENCOUNTER (INPATIENT)
Facility: HOSPITAL | Age: 64
LOS: 8 days | Discharge: HOME | DRG: 291 | End: 2019-01-13
Attending: EMERGENCY MEDICINE | Admitting: HOSPITALIST
Payer: MEDICARE

## 2019-01-05 DIAGNOSIS — R09.02 HYPOXIA: ICD-10-CM

## 2019-01-05 DIAGNOSIS — I10 ESSENTIAL HYPERTENSION: Chronic | ICD-10-CM

## 2019-01-05 DIAGNOSIS — I50.33 ACUTE ON CHRONIC DIASTOLIC CONGESTIVE HEART FAILURE: ICD-10-CM

## 2019-01-05 DIAGNOSIS — I50.43 ACUTE ON CHRONIC COMBINED SYSTOLIC AND DIASTOLIC CONGESTIVE HEART FAILURE: ICD-10-CM

## 2019-01-05 DIAGNOSIS — R06.03 RESPIRATORY DISTRESS: ICD-10-CM

## 2019-01-05 DIAGNOSIS — R57.9 SHOCK: Primary | ICD-10-CM

## 2019-01-05 DIAGNOSIS — I50.20 HEART FAILURE WITH REDUCED LEFT VENTRICULAR FUNCTION: ICD-10-CM

## 2019-01-05 DIAGNOSIS — R06.02 SHORTNESS OF BREATH: ICD-10-CM

## 2019-01-05 DIAGNOSIS — I50.42 CHRONIC COMBINED SYSTOLIC AND DIASTOLIC CONGESTIVE HEART FAILURE: ICD-10-CM

## 2019-01-05 DIAGNOSIS — J18.9 COMMUNITY ACQUIRED PNEUMONIA, UNSPECIFIED LATERALITY: ICD-10-CM

## 2019-01-05 DIAGNOSIS — J81.0 ACUTE PULMONARY EDEMA: ICD-10-CM

## 2019-01-05 DIAGNOSIS — E78.5 HYPERLIPIDEMIA, UNSPECIFIED HYPERLIPIDEMIA TYPE: Chronic | ICD-10-CM

## 2019-01-05 DIAGNOSIS — G93.41 ENCEPHALOPATHY, METABOLIC: ICD-10-CM

## 2019-01-05 DIAGNOSIS — J96.01 ACUTE RESPIRATORY FAILURE WITH HYPOXIA: ICD-10-CM

## 2019-01-05 LAB
ALBUMIN SERPL BCP-MCNC: 3 G/DL
ALLENS TEST: ABNORMAL
ALP SERPL-CCNC: 69 U/L
ALT SERPL W/O P-5'-P-CCNC: 23 U/L
ANION GAP SERPL CALC-SCNC: 11 MMOL/L
AST SERPL-CCNC: 28 U/L
BASOPHILS # BLD AUTO: 0.02 K/UL
BASOPHILS NFR BLD: 0.4 %
BILIRUB SERPL-MCNC: 0.8 MG/DL
BNP SERPL-MCNC: 541 PG/ML
BUN SERPL-MCNC: 18 MG/DL
CALCIUM SERPL-MCNC: 8.9 MG/DL
CHLORIDE SERPL-SCNC: 102 MMOL/L
CO2 SERPL-SCNC: 27 MMOL/L
CREAT SERPL-MCNC: 1.1 MG/DL
DELSYS: ABNORMAL
DIFFERENTIAL METHOD: ABNORMAL
EOSINOPHIL # BLD AUTO: 0.1 K/UL
EOSINOPHIL NFR BLD: 0.9 %
EP: 10
EP: 5
ERYTHROCYTE [DISTWIDTH] IN BLOOD BY AUTOMATED COUNT: 14.9 %
ERYTHROCYTE [SEDIMENTATION RATE] IN BLOOD BY WESTERGREN METHOD: 20 MM/H
ERYTHROCYTE [SEDIMENTATION RATE] IN BLOOD BY WESTERGREN METHOD: 25 MM/H
EST. GFR  (AFRICAN AMERICAN): >60 ML/MIN/1.73 M^2
EST. GFR  (NON AFRICAN AMERICAN): >60 ML/MIN/1.73 M^2
FIO2: 40
GLUCOSE SERPL-MCNC: 123 MG/DL
HCO3 UR-SCNC: 28.6 MMOL/L (ref 24–28)
HCO3 UR-SCNC: 28.8 MMOL/L (ref 24–28)
HCO3 UR-SCNC: 29.6 MMOL/L (ref 24–28)
HCT VFR BLD AUTO: 39 %
HGB BLD-MCNC: 12.4 G/DL
INR PPP: 1.1
IP: 10
IP: 20
LYMPHOCYTES # BLD AUTO: 0.8 K/UL
LYMPHOCYTES NFR BLD: 14.2 %
MCH RBC QN AUTO: 25.2 PG
MCHC RBC AUTO-ENTMCNC: 31.8 G/DL
MCV RBC AUTO: 79 FL
MIN VOL: 11.4
MIN VOL: 15.3
MODE: ABNORMAL
MONOCYTES # BLD AUTO: 0.7 K/UL
MONOCYTES NFR BLD: 12.9 %
NEUTROPHILS # BLD AUTO: 3.9 K/UL
NEUTROPHILS NFR BLD: 71.8 %
PCO2 BLDA: 56.6 MMHG (ref 35–45)
PCO2 BLDA: 58.7 MMHG (ref 35–45)
PCO2 BLDA: 63.3 MMHG (ref 35–45)
PEEP: 5
PH SMN: 7.28 [PH] (ref 7.35–7.45)
PH SMN: 7.3 [PH] (ref 7.35–7.45)
PH SMN: 7.31 [PH] (ref 7.35–7.45)
PIP: 29
PLATELET # BLD AUTO: 151 K/UL
PMV BLD AUTO: 12 FL
PO2 BLDA: 117 MMHG (ref 80–100)
PO2 BLDA: 129 MMHG (ref 80–100)
PO2 BLDA: 92 MMHG (ref 80–100)
POC BE: 1 MMOL/L
POC SATURATED O2: 96 % (ref 95–100)
POC SATURATED O2: 98 % (ref 95–100)
POC SATURATED O2: 99 % (ref 95–100)
POC TCO2: 30 MMOL/L (ref 23–27)
POC TCO2: 31 MMOL/L (ref 23–27)
POC TCO2: 32 MMOL/L (ref 23–27)
POTASSIUM SERPL-SCNC: 3.6 MMOL/L
PROT SERPL-MCNC: 7.5 G/DL
PROTHROMBIN TIME: 11.2 SEC
RBC # BLD AUTO: 4.92 M/UL
SAMPLE: ABNORMAL
SITE: ABNORMAL
SODIUM SERPL-SCNC: 140 MMOL/L
SP02: 92
SP02: 97
SPONT RATE: 25
TROPONIN I SERPL DL<=0.01 NG/ML-MCNC: 0.04 NG/ML
TROPONIN I SERPL DL<=0.01 NG/ML-MCNC: 0.06 NG/ML
TROPONIN I SERPL DL<=0.01 NG/ML-MCNC: 0.08 NG/ML
VT: 500
WBC # BLD AUTO: 5.49 K/UL

## 2019-01-05 PROCEDURE — 99291 CRITICAL CARE FIRST HOUR: CPT | Mod: 25,HCNC

## 2019-01-05 PROCEDURE — 27000221 HC OXYGEN, UP TO 24 HOURS: Mod: HCNC

## 2019-01-05 PROCEDURE — 87205 SMEAR GRAM STAIN: CPT | Mod: HCNC

## 2019-01-05 PROCEDURE — 25000003 PHARM REV CODE 250: Mod: HCNC | Performed by: INTERNAL MEDICINE

## 2019-01-05 PROCEDURE — 63600175 PHARM REV CODE 636 W HCPCS: Mod: HCNC | Performed by: HOSPITALIST

## 2019-01-05 PROCEDURE — 96376 TX/PRO/DX INJ SAME DRUG ADON: CPT | Mod: HCNC

## 2019-01-05 PROCEDURE — 87070 CULTURE OTHR SPECIMN AEROBIC: CPT | Mod: HCNC

## 2019-01-05 PROCEDURE — 85025 COMPLETE CBC W/AUTO DIFF WBC: CPT | Mod: HCNC

## 2019-01-05 PROCEDURE — 25000003 PHARM REV CODE 250: Mod: HCNC

## 2019-01-05 PROCEDURE — 87086 URINE CULTURE/COLONY COUNT: CPT | Mod: HCNC

## 2019-01-05 PROCEDURE — 93010 ELECTROCARDIOGRAM REPORT: CPT | Mod: HCNC,,, | Performed by: INTERNAL MEDICINE

## 2019-01-05 PROCEDURE — 96374 THER/PROPH/DIAG INJ IV PUSH: CPT | Mod: HCNC

## 2019-01-05 PROCEDURE — 94660 CPAP INITIATION&MGMT: CPT

## 2019-01-05 PROCEDURE — 94761 N-INVAS EAR/PLS OXIMETRY MLT: CPT

## 2019-01-05 PROCEDURE — 87070 CULTURE OTHR SPECIMN AEROBIC: CPT | Mod: 59,HCNC

## 2019-01-05 PROCEDURE — 93010 EKG 12-LEAD: ICD-10-PCS | Mod: HCNC,,, | Performed by: INTERNAL MEDICINE

## 2019-01-05 PROCEDURE — 20000000 HC ICU ROOM: Mod: HCNC

## 2019-01-05 PROCEDURE — 99900035 HC TECH TIME PER 15 MIN (STAT): Mod: HCNC

## 2019-01-05 PROCEDURE — 36415 COLL VENOUS BLD VENIPUNCTURE: CPT | Mod: HCNC

## 2019-01-05 PROCEDURE — 84484 ASSAY OF TROPONIN QUANT: CPT | Mod: 91,HCNC

## 2019-01-05 PROCEDURE — 87040 BLOOD CULTURE FOR BACTERIA: CPT | Mod: 59,HCNC

## 2019-01-05 PROCEDURE — 85610 PROTHROMBIN TIME: CPT | Mod: HCNC

## 2019-01-05 PROCEDURE — 63600175 PHARM REV CODE 636 W HCPCS: Mod: HCNC

## 2019-01-05 PROCEDURE — 80053 COMPREHEN METABOLIC PANEL: CPT | Mod: HCNC

## 2019-01-05 PROCEDURE — 25000003 PHARM REV CODE 250: Mod: HCNC | Performed by: EMERGENCY MEDICINE

## 2019-01-05 PROCEDURE — 82803 BLOOD GASES ANY COMBINATION: CPT | Mod: HCNC

## 2019-01-05 PROCEDURE — 25000003 PHARM REV CODE 250: Mod: HCNC | Performed by: HOSPITALIST

## 2019-01-05 PROCEDURE — 63600175 PHARM REV CODE 636 W HCPCS: Performed by: EMERGENCY MEDICINE

## 2019-01-05 PROCEDURE — 37799 UNLISTED PX VASCULAR SURGERY: CPT | Mod: HCNC

## 2019-01-05 PROCEDURE — 83880 ASSAY OF NATRIURETIC PEPTIDE: CPT | Mod: HCNC

## 2019-01-05 PROCEDURE — 93005 ELECTROCARDIOGRAM TRACING: CPT

## 2019-01-05 PROCEDURE — 36600 WITHDRAWAL OF ARTERIAL BLOOD: CPT | Mod: HCNC

## 2019-01-05 PROCEDURE — 99900026 HC AIRWAY MAINTENANCE (STAT): Mod: HCNC

## 2019-01-05 PROCEDURE — 27000190 HC CPAP FULL FACE MASK W/VALVE

## 2019-01-05 PROCEDURE — 63600175 PHARM REV CODE 636 W HCPCS: Mod: HCNC | Performed by: INTERNAL MEDICINE

## 2019-01-05 PROCEDURE — 63600175 PHARM REV CODE 636 W HCPCS: Mod: HCNC | Performed by: EMERGENCY MEDICINE

## 2019-01-05 PROCEDURE — 94002 VENT MGMT INPAT INIT DAY: CPT | Mod: HCNC

## 2019-01-05 RX ORDER — FUROSEMIDE 10 MG/ML
20 INJECTION INTRAMUSCULAR; INTRAVENOUS 3 TIMES DAILY
Status: DISCONTINUED | OUTPATIENT
Start: 2019-01-05 | End: 2019-01-05

## 2019-01-05 RX ORDER — PRAVASTATIN SODIUM 40 MG/1
80 TABLET ORAL DAILY
Status: DISCONTINUED | OUTPATIENT
Start: 2019-01-05 | End: 2019-01-13 | Stop reason: HOSPADM

## 2019-01-05 RX ORDER — ACETAMINOPHEN 10 MG/ML
1000 INJECTION, SOLUTION INTRAVENOUS EVERY 8 HOURS
Status: DISCONTINUED | OUTPATIENT
Start: 2019-01-05 | End: 2019-01-05

## 2019-01-05 RX ORDER — SODIUM CHLORIDE 0.9 % (FLUSH) 0.9 %
5 SYRINGE (ML) INJECTION
Status: DISCONTINUED | OUTPATIENT
Start: 2019-01-05 | End: 2019-01-07

## 2019-01-05 RX ORDER — SPIRONOLACTONE 25 MG/1
25 TABLET ORAL DAILY
Status: DISCONTINUED | OUTPATIENT
Start: 2019-01-05 | End: 2019-01-07

## 2019-01-05 RX ORDER — CEFEPIME HYDROCHLORIDE 1 G/50ML
1 INJECTION, SOLUTION INTRAVENOUS
Status: DISCONTINUED | OUTPATIENT
Start: 2019-01-05 | End: 2019-01-07

## 2019-01-05 RX ORDER — NOREPINEPHRINE BITARTRATE/D5W 4MG/250ML
0.02 PLASTIC BAG, INJECTION (ML) INTRAVENOUS CONTINUOUS
Status: DISCONTINUED | OUTPATIENT
Start: 2019-01-05 | End: 2019-01-08

## 2019-01-05 RX ORDER — SUCCINYLCHOLINE CHLORIDE 20 MG/ML
INJECTION INTRAMUSCULAR; INTRAVENOUS
Status: COMPLETED
Start: 2019-01-05 | End: 2019-01-05

## 2019-01-05 RX ORDER — FUROSEMIDE 10 MG/ML
60 INJECTION INTRAMUSCULAR; INTRAVENOUS 2 TIMES DAILY
Status: DISCONTINUED | OUTPATIENT
Start: 2019-01-05 | End: 2019-01-05

## 2019-01-05 RX ORDER — FENTANYL CITRATE-0.9 % NACL/PF 10 MCG/ML
25 PLASTIC BAG, INJECTION (ML) INTRAVENOUS CONTINUOUS
Status: DISCONTINUED | OUTPATIENT
Start: 2019-01-05 | End: 2019-01-10

## 2019-01-05 RX ORDER — CARVEDILOL 6.25 MG/1
6.25 TABLET ORAL 2 TIMES DAILY WITH MEALS
Status: DISCONTINUED | OUTPATIENT
Start: 2019-01-05 | End: 2019-01-06

## 2019-01-05 RX ORDER — MIDAZOLAM HYDROCHLORIDE 1 MG/ML
2 INJECTION INTRAMUSCULAR; INTRAVENOUS ONCE
Status: COMPLETED | OUTPATIENT
Start: 2019-01-05 | End: 2019-01-05

## 2019-01-05 RX ORDER — MIDAZOLAM HYDROCHLORIDE 1 MG/ML
INJECTION INTRAMUSCULAR; INTRAVENOUS
Status: COMPLETED
Start: 2019-01-05 | End: 2019-01-05

## 2019-01-05 RX ORDER — FUROSEMIDE 10 MG/ML
100 INJECTION INTRAMUSCULAR; INTRAVENOUS 2 TIMES DAILY
Status: DISCONTINUED | OUTPATIENT
Start: 2019-01-06 | End: 2019-01-08

## 2019-01-05 RX ORDER — NOREPINEPHRINE BITARTRATE/D5W 4MG/250ML
PLASTIC BAG, INJECTION (ML) INTRAVENOUS
Status: COMPLETED
Start: 2019-01-05 | End: 2019-01-05

## 2019-01-05 RX ORDER — FUROSEMIDE 10 MG/ML
40 INJECTION INTRAMUSCULAR; INTRAVENOUS
Status: COMPLETED | OUTPATIENT
Start: 2019-01-05 | End: 2019-01-05

## 2019-01-05 RX ORDER — PANTOPRAZOLE SODIUM 40 MG/1
40 FOR SUSPENSION ORAL DAILY
Status: DISCONTINUED | OUTPATIENT
Start: 2019-01-06 | End: 2019-01-10

## 2019-01-05 RX ORDER — CHLORHEXIDINE GLUCONATE ORAL RINSE 1.2 MG/ML
15 SOLUTION DENTAL 2 TIMES DAILY
Status: DISCONTINUED | OUTPATIENT
Start: 2019-01-05 | End: 2019-01-10

## 2019-01-05 RX ORDER — ACETAMINOPHEN 10 MG/ML
1000 INJECTION, SOLUTION INTRAVENOUS EVERY 8 HOURS
Status: COMPLETED | OUTPATIENT
Start: 2019-01-05 | End: 2019-01-06

## 2019-01-05 RX ORDER — PROPOFOL 10 MG/ML
INJECTION, EMULSION INTRAVENOUS
Status: COMPLETED
Start: 2019-01-05 | End: 2019-01-05

## 2019-01-05 RX ORDER — ASPIRIN 325 MG
325 TABLET ORAL 2 TIMES DAILY
Status: DISCONTINUED | OUTPATIENT
Start: 2019-01-05 | End: 2019-01-07

## 2019-01-05 RX ORDER — ENOXAPARIN SODIUM 100 MG/ML
40 INJECTION SUBCUTANEOUS EVERY 24 HOURS
Status: DISCONTINUED | OUTPATIENT
Start: 2019-01-05 | End: 2019-01-13 | Stop reason: HOSPADM

## 2019-01-05 RX ORDER — PROPOFOL 10 MG/ML
5 INJECTION, EMULSION INTRAVENOUS CONTINUOUS
Status: DISCONTINUED | OUTPATIENT
Start: 2019-01-05 | End: 2019-01-07

## 2019-01-05 RX ORDER — ETOMIDATE 2 MG/ML
INJECTION INTRAVENOUS
Status: COMPLETED
Start: 2019-01-05 | End: 2019-01-05

## 2019-01-05 RX ORDER — ACETAMINOPHEN 325 MG/1
650 TABLET ORAL EVERY 6 HOURS PRN
Status: DISCONTINUED | OUTPATIENT
Start: 2019-01-05 | End: 2019-01-11

## 2019-01-05 RX ADMIN — VANCOMYCIN HYDROCHLORIDE 1500 MG: 1 INJECTION, POWDER, LYOPHILIZED, FOR SOLUTION INTRAVENOUS at 07:01

## 2019-01-05 RX ADMIN — PROPOFOL 5 MCG/KG/MIN: 10 INJECTION, EMULSION INTRAVENOUS at 08:01

## 2019-01-05 RX ADMIN — ETOMIDATE 30 MG: 2 INJECTION INTRAVENOUS at 07:01

## 2019-01-05 RX ADMIN — CARVEDILOL 6.25 MG: 6.25 TABLET, FILM COATED ORAL at 05:01

## 2019-01-05 RX ADMIN — FUROSEMIDE 40 MG: 10 INJECTION, SOLUTION INTRAVENOUS at 09:01

## 2019-01-05 RX ADMIN — MIDAZOLAM 1 MG/HR: 5 INJECTION INTRAMUSCULAR; INTRAVENOUS at 09:01

## 2019-01-05 RX ADMIN — NOREPINEPHRINE-DEXTROSE IV SOLUTION 4 MG/250ML-5% 0.2 MCG/KG/MIN: 4-5/250 SOLUTION at 10:01

## 2019-01-05 RX ADMIN — Medication 50 MCG/HR: at 09:01

## 2019-01-05 RX ADMIN — ENOXAPARIN SODIUM 40 MG: 100 INJECTION SUBCUTANEOUS at 05:01

## 2019-01-05 RX ADMIN — MIDAZOLAM HYDROCHLORIDE 2 MG: 1 INJECTION, SOLUTION INTRAMUSCULAR; INTRAVENOUS at 08:01

## 2019-01-05 RX ADMIN — ACETAMINOPHEN 1000 MG: 10 INJECTION, SOLUTION INTRAVENOUS at 06:01

## 2019-01-05 RX ADMIN — CEFEPIME HYDROCHLORIDE 1 G: 1 INJECTION, SOLUTION INTRAVENOUS at 06:01

## 2019-01-05 RX ADMIN — PRAVASTATIN SODIUM 80 MG: 40 TABLET ORAL at 11:01

## 2019-01-05 RX ADMIN — FUROSEMIDE 20 MG: 10 INJECTION, SOLUTION INTRAMUSCULAR; INTRAVENOUS at 08:01

## 2019-01-05 RX ADMIN — ACETAMINOPHEN 650 MG: 325 TABLET ORAL at 05:01

## 2019-01-05 RX ADMIN — FUROSEMIDE 60 MG: 10 INJECTION, SOLUTION INTRAMUSCULAR; INTRAVENOUS at 05:01

## 2019-01-05 RX ADMIN — MIDAZOLAM HYDROCHLORIDE 2 MG: 1 INJECTION INTRAMUSCULAR; INTRAVENOUS at 08:01

## 2019-01-05 RX ADMIN — SUCCINYLCHOLINE CHLORIDE 120 MG: 20 INJECTION, SOLUTION INTRAMUSCULAR; INTRAVENOUS at 08:01

## 2019-01-05 RX ADMIN — SPIRONOLACTONE 25 MG: 25 TABLET ORAL at 11:01

## 2019-01-05 RX ADMIN — CHLORHEXIDINE GLUCONATE 0.12% ORAL RINSE 15 ML: 1.2 LIQUID ORAL at 10:01

## 2019-01-05 RX ADMIN — NOREPINEPHRINE-DEXTROSE IV SOLUTION 4 MG/250ML-5% 0.02 MCG/KG/MIN: 4-5/250 SOLUTION at 07:01

## 2019-01-05 RX ADMIN — ASPIRIN 325 MG ORAL TABLET 325 MG: 325 PILL ORAL at 11:01

## 2019-01-05 NOTE — ASSESSMENT & PLAN NOTE
Duo to CHF exacerbation with acute hypoxic respiratory failure,he has been started on  supplemental oxygen,via bipap,patient is tachypnic at this time,will continue with biapap at this time,aleday received IV lasix.

## 2019-01-05 NOTE — SUBJECTIVE & OBJECTIVE
Past Medical History:   Diagnosis Date    CHF (congestive heart failure)     Hyperlipidemia     Hypertension        Past Surgical History:   Procedure Laterality Date    Heart Cath-Bilateral Bilateral 2018    Performed by Shailesh Eng MD at [facility] CATH LAB    TESTICLE SURGERY         Review of patient's allergies indicates:  No Known Allergies    No current facility-administered medications on file prior to encounter.      Current Outpatient Medications on File Prior to Encounter   Medication Sig    aspirin 325 MG tablet Take 325 mg by mouth 2 (two) times daily.     carvedilol (COREG) 6.25 MG tablet Take 1 tablet (6.25 mg total) by mouth 2 (two) times daily with meals.    furosemide (LASIX) 80 MG tablet Take 1 tablet (80 mg total) by mouth 2 (two) times daily.    pravastatin (PRAVACHOL) 80 MG tablet Take 1 tablet (80 mg total) by mouth once daily.    sacubitril-valsartan (ENTRESTO) 49-51 mg per tablet Take 1 tablet by mouth 2 (two) times daily.    spironolactone (ALDACTONE) 25 MG tablet Take 1 tablet (25 mg total) by mouth once daily.     Family History     Problem Relation (Age of Onset)    Epilepsy Mother        Tobacco Use    Smoking status: Former Smoker     Packs/day: 0.50     Years: 40.00     Pack years: 20.00     Types: Cigarettes, Cigars     Last attempt to quit: 2014     Years since quittin.0    Smokeless tobacco: Never Used   Substance and Sexual Activity    Alcohol use: Yes     Comment: once a month    Drug use: No    Sexual activity: Yes     Birth control/protection: None     Comment: uses protection sometimes     Review of Systems   Constitutional: Negative for activity change and appetite change.   HENT: Negative for dental problem.    Eyes: Negative for discharge and itching.   Respiratory: Positive for shortness of breath.    Cardiovascular: Negative for chest pain.   Gastrointestinal: Negative for abdominal pain and anal bleeding.   Endocrine: Negative for cold intolerance  and heat intolerance.   Genitourinary: Negative for enuresis.   Musculoskeletal: Negative for arthralgias and back pain.   Skin: Negative for color change and pallor.   Allergic/Immunologic: Negative for food allergies.   Neurological: Negative for dizziness and facial asymmetry.   Hematological: Negative for adenopathy. Does not bruise/bleed easily.   Psychiatric/Behavioral: Negative for agitation and behavioral problems.     Objective:     Vital Signs (Most Recent):  Temp: 98.6 °F (37 °C) (01/05/19 0824)  Pulse: 79 (01/05/19 0852)  Resp: (!) 49 (01/05/19 0852)  BP: 131/73 (01/05/19 0832)  SpO2: 100 % (01/05/19 0852) Vital Signs (24h Range):  Temp:  [98.6 °F (37 °C)] 98.6 °F (37 °C)  Pulse:  [79-88] 79  Resp:  [24-49] 49  SpO2:  [88 %-100 %] 100 %  BP: (131-137)/(72-73) 131/73     Weight: 136.1 kg (300 lb)  Body mass index is 35.57 kg/m².    Physical Exam   Constitutional: He is oriented to person, place, and time. No distress.   HENT:   Head: Atraumatic.   Eyes: EOM are normal. Pupils are equal, round, and reactive to light.   Neck: Normal range of motion. Neck supple.   Cardiovascular: Normal rate.   Pulmonary/Chest: He is in respiratory distress.   Diminished breath sound,bilateral,more basal,   Abdominal: Soft. Bowel sounds are normal. He exhibits distension. There is no tenderness.   Musculoskeletal: Normal range of motion. He exhibits edema.   Neurological: He is oriented to person, place, and time. No cranial nerve deficit. Coordination normal.   Skin: Skin is dry.   Psychiatric: He has a normal mood and affect. His behavior is normal.         CRANIAL NERVES     CN III, IV, VI   Pupils are equal, round, and reactive to light.  Extraocular motions are normal.        Significant Labs:   BMP:   Recent Labs   Lab 01/05/19 0837   *      K 3.6      CO2 27   BUN 18   CREATININE 1.1   CALCIUM 8.9     CBC:   Recent Labs   Lab 01/05/19 0837   WBC 5.49   HGB 12.4*   HCT 39.0*         Troponin:   Recent Labs   Lab 01/05/19  0837   TROPONINI 0.045*       Significant Imaging:reviewed.

## 2019-01-05 NOTE — PROGRESS NOTES
Patient transferred to room 326 on a 2lpm NC and once in room placed back on the BIPAP at the previous settings. Report given to RRT that has 3rd floor.

## 2019-01-05 NOTE — PROGRESS NOTES
01/05/19 1532   Vital Signs   Temp 100.2 °F (37.9 °C)     MD notified of above temp. Order given for PRN tylenol. Will follow through with orders and continue to monitor pt.

## 2019-01-05 NOTE — NURSING
Pt taken to ICU with RT , charge nurse and transport staff. Attempted to call report to ICU nurse, was informed that she was on another line and that she would call me back.

## 2019-01-05 NOTE — NURSING
Notified Dr. Urbina that pt is now with increased respiratory rate of 50 breaths per minutes and an increase in labored breathing. Order given to transfer pt to ICU and obtain ABGs. Notified house supervisor, charge nurse and RT of orders. Patient and family updated. Pt remains AAO. Waiting for ICU bed assignment.

## 2019-01-05 NOTE — ASSESSMENT & PLAN NOTE
With EF of 20% and grade 3 diastolic CHF on echo on 11/2018,will continue with IV lasix,BB  and Entresto,will monitor out put.

## 2019-01-05 NOTE — PROGRESS NOTES
Patient placed on BIPAP 10/5 40% fitted  With a large FFM. Pt face and nose checked for redness and skin break down, none noted at this time.

## 2019-01-05 NOTE — HPI
"Mr. Bhakta is a 64 yo man with combined HF (EF 20%, G3DD), AICD in place, Hyperlipidemia, and Hypertension who presented to the ED c/o gradually worsening and severe (10/10) SOB with associated bilateral lower extremity swelling x1 week. He also reports a hematemesis and productive cough with sputum described as "like grits." He is not on supplementary O2 at home. He is compliant with his Lasix 80mg x2/day. He denies fever, chills, diaphoresis, nausea, emesis, diarrhea, abdominal pain, chest pain, back pain, difficulty urinating and rash. Patient was hypoxic upon arrival to 80%. He was started on supplemental oxygen via BiPAP. CXR w/ acute pulmonary edema. He was started on IV Lasix. He claims to be complaint with a low salt diet and Lasix.  "

## 2019-01-05 NOTE — ED TRIAGE NOTES
Pt presents to ED c/o sob and coughing x 1 week.  States hx of CHF and HTN.  States he took his morning medications.  Denies chest pains, asthma.

## 2019-01-05 NOTE — ED PROVIDER NOTES
"Encounter Date: 2019    SCRIBE #1 NOTE: I, Jeremiah Nair, am scribing for, and in the presence of,  GREY Brunner MD. I have scribed the following portions of the note - Other sections scribed: HPI, ROS and PE.       History     Chief Complaint   Patient presents with    Shortness of Breath     sob x 1 week.  + swelling bilat lower ext.  Pt with increased effort to breathe.    Hematemesis     just started this am.     CC: Shortness of Breath and Hemoptysis     HPI: This 63 y.o M with CHF, Hyperlipidemia, and Hypertension presents to the ED c/o gradually worsening and severe (10/10) SOB with associated bilateral lower extremity swelling x1 week. He also reports a hematemesis and productive cough with sputum described as "like grits." He is not on supplementary O2 at home. He is compliant with his Lasix 80mg x2/day. He denies fever, chills, diaphoresis, nausea, emesis, hematemesis, diarrhea, abdominal pain, chest pain, back pain, difficulty urinating and rash. No prior tx.       The history is provided by the patient. No  was used.     Review of patient's allergies indicates:  No Known Allergies  Past Medical History:   Diagnosis Date    CHF (congestive heart failure)     Hyperlipidemia     Hypertension      Past Surgical History:   Procedure Laterality Date    Heart Cath-Bilateral Bilateral 2018    Performed by Shail[facility] Eng [facility] at [facility] CATH LAB    TESTICLE SURGERY       Family History   Problem Relation Age of Onset    Epilepsy Mother      Social History     Tobacco Use    Smoking status: Former Smoker     Packs/day: 0.50     Years: 40.00     Pack years: 20.00     Types: Cigarettes, Cigars     Last attempt to quit: 2014     Years since quittin.0    Smokeless tobacco: Never Used   Substance Use Topics    Alcohol use: Yes     Comment: once a month    Drug use: No     Review of Systems   Constitutional: Negative for chills and fever.   HENT: Negative for rhinorrhea " and sore throat.    Eyes: Negative for redness.   Respiratory: Positive for cough (productive) and shortness of breath.         (+) hemoptysis   Cardiovascular: Positive for leg swelling (bilateral). Negative for chest pain.   Gastrointestinal: Negative for abdominal pain, diarrhea, nausea and vomiting.        (-) hematemesis   Genitourinary: Negative for dysuria.   Musculoskeletal: Negative for back pain.   Skin: Negative for color change.   Neurological: Negative for syncope and weakness.   Psychiatric/Behavioral: The patient is not nervous/anxious.        Physical Exam     Initial Vitals [01/05/19 0824]   BP Pulse Resp Temp SpO2   137/72 88 (!) 24 98.6 °F (37 °C) (!) 88 %      MAP       --         Physical Exam    Vitals reviewed.  Constitutional: He appears well-developed and well-nourished. He appears distressed.   HENT:   Head: Normocephalic and atraumatic.   Eyes: EOM are normal. Pupils are equal, round, and reactive to light.   Neck: Normal range of motion. Neck supple.   Cardiovascular: Normal rate, regular rhythm, normal heart sounds and intact distal pulses.   Pulmonary/Chest: Accessory muscle usage present. Tachypnea noted. He is in respiratory distress. He has decreased breath sounds.   Abdominal: Soft. Bowel sounds are normal. He exhibits no distension. There is no tenderness.   Musculoskeletal: Normal range of motion.        Right lower leg: He exhibits edema.        Left lower leg: He exhibits edema.   Neurological: He is alert and oriented to person, place, and time.   Skin: Skin is warm and dry.   Psychiatric: He has a normal mood and affect.         ED Course   Critical Care  Date/Time: 1/5/2019 9:33 AM  Performed by: Jose Brunner MD  Authorized by: Jose Brunner MD   Total critical care time (exclusive of procedural time) : 37 minutes  Critical care was necessary to treat or prevent imminent or life-threatening deterioration of the following conditions: respiratory failure and  cardiac failure.  Critical care was time spent personally by me on the following activities: development of treatment plan with patient or surrogate, examination of patient, ordering and performing treatments and interventions, ordering and review of radiographic studies, re-evaluation of patient's condition, pulse oximetry, ordering and review of laboratory studies, obtaining history from patient or surrogate, evaluation of patient's response to treatment, discussions with consultants and review of old charts.        Labs Reviewed   CBC W/ AUTO DIFFERENTIAL - Abnormal; Notable for the following components:       Result Value    Hemoglobin 12.4 (*)     Hematocrit 39.0 (*)     MCV 79 (*)     MCH 25.2 (*)     MCHC 31.8 (*)     RDW 14.9 (*)     Lymph # 0.8 (*)     Lymph% 14.2 (*)     All other components within normal limits   COMPREHENSIVE METABOLIC PANEL - Abnormal; Notable for the following components:    Glucose 123 (*)     Albumin 3.0 (*)     All other components within normal limits   TROPONIN I - Abnormal; Notable for the following components:    Troponin I 0.045 (*)     All other components within normal limits   B-TYPE NATRIURETIC PEPTIDE - Abnormal; Notable for the following components:     (*)     All other components within normal limits   CULTURE, RESPIRATORY   PROTIME-INR     EKG Readings: (Independently Interpreted)   Initial Reading: No STEMI. Rhythm: Normal Sinus Rhythm. Clinical Impression: Idioventricular Rhythm       Imaging Results          X-Ray Chest AP Portable (Final result)  Result time 01/05/19 09:14:55    Final result by Danny Vital MD (01/05/19 09:14:55)                 Impression:      As above      Electronically signed by: Danny Vital  Date:    01/05/2019  Time:    09:14             Narrative:    EXAMINATION:  XR CHEST AP PORTABLE    CLINICAL HISTORY:  CHF;    TECHNIQUE:  Single frontal view of the chest was performed.    COMPARISON:  Chest PA and lateral dated  11/04/2018    FINDINGS:  There is increased pulmonary vascular congestion with edema and cardiomegaly.  No pneumothorax.  No acute osseous abnormality.                              X-Rays:   Independently Interpreted Readings:   Chest X-Ray: Increased vascular markings consistent with CHF are present.     Medical Decision Making:   History:   Old Records Summarized: other records.       <> Summary of Records: History of diastolic heart failure with ejection fraction in the 20s.  Initial Assessment:   Medical decision-making:    The patient received a medical screening exam. If performed, the EKG was independently evaluated by me and is pending final cardiology evaluation.  If performed, all radiographic studies were independently evaluated by me and are pending final radiology evaluation. If labs were ordered, they were reviewed. Vital signs are independently assessed by me.  If performed, the pulse oximetry was independently evaluated by me.  I decided to obtain the patient's past medical record.  If available, I reviewed the patient's past medical record, including most recent labs and radiology reports.    ED Management:  Patient presents to the emergency department with shortness of breath. He has increased work of breathing and is in mild respiratory distress. He is hypoxic in the 80s.  Patient was quickly placed on BiPAP with improvement of his respiratory mechanics.  His oxygenation went into the 90s to 100%.  Chest x-ray reveals diffuse pulmonary edema consistent with acute on chronic heart failure with decompensation.  Patient will need to be admitted to the hospital for diuresis and continued respiratory support.  Patient was given Lasix in the emergency department has already started to diurese.  Patient is chest pain-free and shows no evidence of ST segment elevations on EKG.  Troponin is mildly elevated likely due to some demand ischemia.  Will continue to trend troponins.    I discussed the patient's  presentation and workup with the hospitalist who agrees with placing the patient in the hospital.  I have placed orders for the hospitalist.   The results and physical exam findings were reviewed with the patient. Pt agrees with assessment, disposition and treatment plan and has no further questions or complaints at this time.    GREY Brunner M.D. 9:36 AM 1/5/2019              Scribe Attestation:   Scribe #1: I performed the above scribed service and the documentation accurately describes the services I performed. I attest to the accuracy of the note.    Attending Attestation:           Physician Attestation for Scribe:  Physician Attestation Statement for Scribe #1: I, GREY Brunner MD, reviewed documentation, as scribed by Jeremiah Nair in my presence, and it is both accurate and complete.                    Clinical Impression:   The primary encounter diagnosis was Acute on chronic diastolic congestive heart failure. Diagnoses of Shortness of breath, Hypoxia, and Respiratory distress were also pertinent to this visit.                             Jose Brunner MD  01/05/19 0936       Jose Brunner MD  01/05/19 1013

## 2019-01-05 NOTE — H&P
"[facility] Hospital Medicine  History & Physical    Patient Name: Papito Bhakta  MRN: [id number]  Admission Date: 1/5/2019  Attending Physician: Jihan Urias    Primary Care Provider: Brynn To MD         Patient information was obtained from patient and ER records.     Subjective:     Principal Problem:Acute pulmonary edema    Chief Complaint:   Chief Complaint   Patient presents with    Shortness of Breath     sob x 1 week.  + swelling bilat lower ext.  Pt with increased effort to breathe.    Hematemesis     just started this am.        HPI: This 63 y.o M with CHF with Ef of 20%,, Hyperlipidemia, and Hypertension presents to the ED c/o gradually worsening and severe (10/10) SOB with associated bilateral lower extremity swelling x1 week. He also reports a hematemesis and productive cough with sputum described as "like grits." He is not on supplementary O2 at home. He is compliant with his Lasix 80mg x2/day. He denies fever, chills, diaphoresis, nausea, emesis, diarrhea, abdominal pain, chest pain, back pain, difficulty urinating and rash,patient was hypoxic a arrival 80 %,was started on supplemental oxygen via bipap,chest  Ray show acute pulmonary edema,patient has been started on IV lasix,he say he is complaint with ow salt diet and lasix.        Past Medical History:   Diagnosis Date    CHF (congestive heart failure)     Hyperlipidemia     Hypertension        Past Surgical History:   Procedure Laterality Date    Heart Cath-Bilateral Bilateral 2/1/2018    [facility]    TESTICLE SURGERY         Review of patient's allergies indicates:  No Known Allergies    No current facility-administered medications on file prior to encounter.      Current Outpatient Medications on File Prior to Encounter   Medication Sig    aspirin 325 MG tablet Take 325 mg by mouth 2 (two) times daily.     carvedilol (COREG) 6.25 MG tablet Take 1 tablet (6.25 mg " total) by mouth 2 (two) times daily with meals.    furosemide (LASIX) 80 MG tablet Take 1 tablet (80 mg total) by mouth 2 (two) times daily.    pravastatin (PRAVACHOL) 80 MG tablet Take 1 tablet (80 mg total) by mouth once daily.    sacubitril-valsartan (ENTRESTO) 49-51 mg per tablet Take 1 tablet by mouth 2 (two) times daily.    spironolactone (ALDACTONE) 25 MG tablet Take 1 tablet (25 mg total) by mouth once daily.     Family History     Problem Relation (Age of Onset)    Epilepsy Mother        Tobacco Use    Smoking status: Former Smoker     Packs/day: 0.50     Years: 40.00     Pack years: 20.00     Types: Cigarettes, Cigars     Last attempt to quit: 2014     Years since quittin.0    Smokeless tobacco: Never Used   Substance and Sexual Activity    Alcohol use: Yes     Comment: once a month    Drug use: No    Sexual activity: Yes     Birth control/protection: None     Comment: uses protection sometimes     Review of Systems   Constitutional: Negative for activity change and appetite change.   HENT: Negative for dental problem.    Eyes: Negative for discharge and itching.   Respiratory: Positive for shortness of breath.    Cardiovascular: Negative for chest pain.   Gastrointestinal: Negative for abdominal pain and anal bleeding.   Endocrine: Negative for cold intolerance and heat intolerance.   Genitourinary: Negative for enuresis.   Musculoskeletal: Negative for arthralgias and back pain.   Skin: Negative for color change and pallor.   Allergic/Immunologic: Negative for food allergies.   Neurological: Negative for dizziness and facial asymmetry.   Hematological: Negative for adenopathy. Does not bruise/bleed easily.   Psychiatric/Behavioral: Negative for agitation and behavioral problems.     Objective:     Vital Signs (Most Recent):  Temp: 98.6 °F (37 °C) (19)  Pulse: 79 (19)  Resp: (!) 49 (19)  BP: 131/73 (1932)  SpO2: 100 % (19) Vital Signs (24h  Range):  Temp:  [98.6 °F (37 °C)] 98.6 °F (37 °C)  Pulse:  [79-88] 79  Resp:  [24-49] 49  SpO2:  [88 %-100 %] 100 %  BP: (131-137)/(72-73) 131/73     Weight: 136.1 kg (300 lb)  Body mass index is 35.57 kg/m².    Physical Exam   Constitutional: He is oriented to person, place, and time. No distress.   HENT:   Head: Atraumatic.   Eyes: EOM are normal. Pupils are equal, round, and reactive to light.   Neck: Normal range of motion. Neck supple.   Cardiovascular: Normal rate.   Pulmonary/Chest: He is in respiratory distress.   Diminished breath sound,bilateral,more basal,   Abdominal: Soft. Bowel sounds are normal. He exhibits distension. There is no tenderness.   Musculoskeletal: Normal range of motion. He exhibits edema.   Neurological: He is oriented to person, place, and time. No cranial nerve deficit. Coordination normal.   Skin: Skin is dry.   Psychiatric: He has a normal mood and affect. His behavior is normal.         CRANIAL NERVES     CN III, IV, VI   Pupils are equal, round, and reactive to light.  Extraocular motions are normal.        Significant Labs:   BMP:   Recent Labs   Lab 01/05/19  0837   *      K 3.6      CO2 27   BUN 18   CREATININE 1.1   CALCIUM 8.9     CBC:   Recent Labs   Lab 01/05/19  0837   WBC 5.49   HGB 12.4*   HCT 39.0*        Troponin:   Recent Labs   Lab 01/05/19  0837   TROPONINI 0.045*       Significant Imaging:reviewed.    Assessment/Plan:     * Acute pulmonary edema    Duo to CHF exacerbation with acute hypoxic respiratory failure,he has been started on  supplemental oxygen,via bipap,patient is tachypnic at this time,will continue with biapap at this time,aleday received IV lasix.       Acute on chronic combined systolic and diastolic congestive heart failure    With EF of 20% and grade 3 diastolic CHF on echo on 11/2018,will continue with IV lasix,BB  and Entresto,will monitor out put.       Acute respiratory failure with hypoxia    Duo to acute pulmonary  edema,on bipap.       Hyperlipidemia    On statin.       BMI 40.0-44.9, adult    Weight lose  as out patient.       Essential hypertension    Resume Entresto and coreg,IV lasix.         VTE Risk Mitigation (From admission, onward)    None             Jihan Urias MD  Department of Hospital Medicine   [facility]

## 2019-01-05 NOTE — NURSING
Pt arrived to room. Pt is AAO. Pt on bipap. Pt denies needs. Pt oriented to room and call system. Bed in lowest position, side rails up x2 and call light in reach.Will continue to monitor.

## 2019-01-06 PROBLEM — G93.41 ENCEPHALOPATHY, METABOLIC: Status: ACTIVE | Noted: 2019-01-06

## 2019-01-06 PROBLEM — J18.9 CAP (COMMUNITY ACQUIRED PNEUMONIA): Status: ACTIVE | Noted: 2019-01-06

## 2019-01-06 PROBLEM — I10 ESSENTIAL HYPERTENSION: Chronic | Status: ACTIVE | Noted: 2017-12-01

## 2019-01-06 PROBLEM — E78.5 HYPERLIPIDEMIA: Chronic | Status: ACTIVE | Noted: 2019-01-05

## 2019-01-06 LAB
ALLENS TEST: ABNORMAL
ANION GAP SERPL CALC-SCNC: 9 MMOL/L
ANION GAP SERPL CALC-SCNC: 9 MMOL/L
BASOPHILS # BLD AUTO: 0.03 K/UL
BASOPHILS NFR BLD: 0.6 %
BUN SERPL-MCNC: 13 MG/DL
BUN SERPL-MCNC: 17 MG/DL
CALCIUM SERPL-MCNC: 8.4 MG/DL
CALCIUM SERPL-MCNC: 8.7 MG/DL
CHLORIDE SERPL-SCNC: 100 MMOL/L
CHLORIDE SERPL-SCNC: 102 MMOL/L
CO2 SERPL-SCNC: 28 MMOL/L
CO2 SERPL-SCNC: 29 MMOL/L
CREAT SERPL-MCNC: 0.9 MG/DL
CREAT SERPL-MCNC: 1.1 MG/DL
DELSYS: ABNORMAL
DIFFERENTIAL METHOD: ABNORMAL
EOSINOPHIL # BLD AUTO: 0.1 K/UL
EOSINOPHIL NFR BLD: 1.2 %
ERYTHROCYTE [DISTWIDTH] IN BLOOD BY AUTOMATED COUNT: 14.8 %
ERYTHROCYTE [SEDIMENTATION RATE] IN BLOOD BY WESTERGREN METHOD: 25 MM/H
EST. GFR  (AFRICAN AMERICAN): >60 ML/MIN/1.73 M^2
EST. GFR  (AFRICAN AMERICAN): >60 ML/MIN/1.73 M^2
EST. GFR  (NON AFRICAN AMERICAN): >60 ML/MIN/1.73 M^2
EST. GFR  (NON AFRICAN AMERICAN): >60 ML/MIN/1.73 M^2
FIO2: 35
GLUCOSE SERPL-MCNC: 134 MG/DL
GLUCOSE SERPL-MCNC: 137 MG/DL
HCO3 UR-SCNC: 26.8 MMOL/L (ref 24–28)
HCT VFR BLD AUTO: 35.3 %
HGB BLD-MCNC: 11.8 G/DL
LYMPHOCYTES # BLD AUTO: 1 K/UL
LYMPHOCYTES NFR BLD: 20.7 %
MAGNESIUM SERPL-MCNC: 2 MG/DL
MCH RBC QN AUTO: 27.5 PG
MCHC RBC AUTO-ENTMCNC: 33.4 G/DL
MCV RBC AUTO: 82 FL
MIN VOL: 13.9
MODE: ABNORMAL
MONOCYTES # BLD AUTO: 0.6 K/UL
MONOCYTES NFR BLD: 12.5 %
NEUTROPHILS # BLD AUTO: 3.3 K/UL
NEUTROPHILS NFR BLD: 65.2 %
PCO2 BLDA: 31 MMHG (ref 35–45)
PEEP: 5
PH SMN: 7.54 [PH] (ref 7.35–7.45)
PHOSPHATE SERPL-MCNC: 1.4 MG/DL
PIP: 31
PLATELET # BLD AUTO: 184 K/UL
PLATELET BLD QL SMEAR: ABNORMAL
PMV BLD AUTO: 12.4 FL
PO2 BLDA: 88 MMHG (ref 80–100)
POC BE: 5 MMOL/L
POC SATURATED O2: 98 % (ref 95–100)
POC TCO2: 28 MMOL/L (ref 23–27)
POTASSIUM SERPL-SCNC: 3 MMOL/L
POTASSIUM SERPL-SCNC: 3 MMOL/L
PROCALCITONIN SERPL IA-MCNC: 0.1 NG/ML
RBC # BLD AUTO: 4.29 M/UL
SAMPLE: ABNORMAL
SITE: ABNORMAL
SODIUM SERPL-SCNC: 137 MMOL/L
SODIUM SERPL-SCNC: 140 MMOL/L
SP02: 100
VANCOMYCIN TROUGH SERPL-MCNC: 17.3 UG/ML
VT: 550
WBC # BLD AUTO: 5.03 K/UL

## 2019-01-06 PROCEDURE — 63600175 PHARM REV CODE 636 W HCPCS: Mod: HCNC | Performed by: EMERGENCY MEDICINE

## 2019-01-06 PROCEDURE — 94003 VENT MGMT INPAT SUBQ DAY: CPT | Mod: HCNC

## 2019-01-06 PROCEDURE — 99291 PR CRITICAL CARE, E/M 30-74 MINUTES: ICD-10-PCS | Mod: HCNC,,, | Performed by: INTERNAL MEDICINE

## 2019-01-06 PROCEDURE — 84145 PROCALCITONIN (PCT): CPT | Mod: HCNC

## 2019-01-06 PROCEDURE — 99900035 HC TECH TIME PER 15 MIN (STAT): Mod: HCNC

## 2019-01-06 PROCEDURE — 82803 BLOOD GASES ANY COMBINATION: CPT | Mod: HCNC

## 2019-01-06 PROCEDURE — 63600175 PHARM REV CODE 636 W HCPCS: Mod: HCNC | Performed by: INTERNAL MEDICINE

## 2019-01-06 PROCEDURE — 63600175 PHARM REV CODE 636 W HCPCS: Mod: HCNC | Performed by: HOSPITALIST

## 2019-01-06 PROCEDURE — 83735 ASSAY OF MAGNESIUM: CPT | Mod: HCNC

## 2019-01-06 PROCEDURE — 80202 ASSAY OF VANCOMYCIN: CPT | Mod: HCNC

## 2019-01-06 PROCEDURE — 25000003 PHARM REV CODE 250: Mod: HCNC | Performed by: INTERNAL MEDICINE

## 2019-01-06 PROCEDURE — 99291 CRITICAL CARE FIRST HOUR: CPT | Mod: HCNC,,, | Performed by: INTERNAL MEDICINE

## 2019-01-06 PROCEDURE — 85025 COMPLETE CBC W/AUTO DIFF WBC: CPT | Mod: HCNC

## 2019-01-06 PROCEDURE — 36600 WITHDRAWAL OF ARTERIAL BLOOD: CPT | Mod: HCNC

## 2019-01-06 PROCEDURE — 94761 N-INVAS EAR/PLS OXIMETRY MLT: CPT | Mod: HCNC

## 2019-01-06 PROCEDURE — 80048 BASIC METABOLIC PNL TOTAL CA: CPT | Mod: HCNC

## 2019-01-06 PROCEDURE — 80048 BASIC METABOLIC PNL TOTAL CA: CPT | Mod: 91,HCNC

## 2019-01-06 PROCEDURE — 36415 COLL VENOUS BLD VENIPUNCTURE: CPT | Mod: HCNC

## 2019-01-06 PROCEDURE — 20000000 HC ICU ROOM: Mod: HCNC

## 2019-01-06 PROCEDURE — 84100 ASSAY OF PHOSPHORUS: CPT | Mod: HCNC

## 2019-01-06 PROCEDURE — 25000003 PHARM REV CODE 250: Mod: HCNC | Performed by: HOSPITALIST

## 2019-01-06 PROCEDURE — 25000003 PHARM REV CODE 250: Mod: HCNC | Performed by: EMERGENCY MEDICINE

## 2019-01-06 PROCEDURE — 27000221 HC OXYGEN, UP TO 24 HOURS: Mod: HCNC

## 2019-01-06 PROCEDURE — 99900026 HC AIRWAY MAINTENANCE (STAT): Mod: HCNC

## 2019-01-06 RX ORDER — POTASSIUM CHLORIDE 7.45 MG/ML
10 INJECTION INTRAVENOUS
Status: COMPLETED | OUTPATIENT
Start: 2019-01-06 | End: 2019-01-06

## 2019-01-06 RX ORDER — MAGNESIUM SULFATE HEPTAHYDRATE 40 MG/ML
2 INJECTION, SOLUTION INTRAVENOUS ONCE
Status: COMPLETED | OUTPATIENT
Start: 2019-01-06 | End: 2019-01-06

## 2019-01-06 RX ADMIN — PANTOPRAZOLE SODIUM 40 MG: 40 GRANULE, DELAYED RELEASE ORAL at 08:01

## 2019-01-06 RX ADMIN — POTASSIUM CHLORIDE 10 MEQ: 7.46 INJECTION, SOLUTION INTRAVENOUS at 08:01

## 2019-01-06 RX ADMIN — VANCOMYCIN HYDROCHLORIDE 1500 MG: 1 INJECTION, POWDER, LYOPHILIZED, FOR SOLUTION INTRAVENOUS at 09:01

## 2019-01-06 RX ADMIN — CEFEPIME HYDROCHLORIDE 1 G: 1 INJECTION, SOLUTION INTRAVENOUS at 08:01

## 2019-01-06 RX ADMIN — NOREPINEPHRINE-DEXTROSE IV SOLUTION 4 MG/250ML-5% 0.02 MCG/KG/MIN: 4-5/250 SOLUTION at 05:01

## 2019-01-06 RX ADMIN — SPIRONOLACTONE 25 MG: 25 TABLET ORAL at 08:01

## 2019-01-06 RX ADMIN — MIDAZOLAM 7 MG/HR: 5 INJECTION INTRAMUSCULAR; INTRAVENOUS at 08:01

## 2019-01-06 RX ADMIN — ASPIRIN 325 MG ORAL TABLET 325 MG: 325 PILL ORAL at 08:01

## 2019-01-06 RX ADMIN — ENOXAPARIN SODIUM 40 MG: 100 INJECTION SUBCUTANEOUS at 05:01

## 2019-01-06 RX ADMIN — POTASSIUM CHLORIDE 10 MEQ: 7.46 INJECTION, SOLUTION INTRAVENOUS at 09:01

## 2019-01-06 RX ADMIN — NOREPINEPHRINE-DEXTROSE IV SOLUTION 4 MG/250ML-5% 0.08 MCG/KG/MIN: 4-5/250 SOLUTION at 10:01

## 2019-01-06 RX ADMIN — POTASSIUM CHLORIDE 10 MEQ: 7.46 INJECTION, SOLUTION INTRAVENOUS at 02:01

## 2019-01-06 RX ADMIN — VANCOMYCIN HYDROCHLORIDE 1500 MG: 1 INJECTION, POWDER, LYOPHILIZED, FOR SOLUTION INTRAVENOUS at 04:01

## 2019-01-06 RX ADMIN — PRAVASTATIN SODIUM 80 MG: 40 TABLET ORAL at 08:01

## 2019-01-06 RX ADMIN — MAGNESIUM SULFATE IN WATER 2 G: 40 INJECTION, SOLUTION INTRAVENOUS at 07:01

## 2019-01-06 RX ADMIN — ACETAMINOPHEN 1000 MG: 10 INJECTION, SOLUTION INTRAVENOUS at 06:01

## 2019-01-06 RX ADMIN — POTASSIUM CHLORIDE 10 MEQ: 7.46 INJECTION, SOLUTION INTRAVENOUS at 11:01

## 2019-01-06 RX ADMIN — CHLORHEXIDINE GLUCONATE 0.12% ORAL RINSE 15 ML: 1.2 LIQUID ORAL at 08:01

## 2019-01-06 RX ADMIN — NOREPINEPHRINE-DEXTROSE IV SOLUTION 4 MG/250ML-5% 0.08 MCG/KG/MIN: 4-5/250 SOLUTION at 07:01

## 2019-01-06 RX ADMIN — POTASSIUM CHLORIDE 10 MEQ: 7.46 INJECTION, SOLUTION INTRAVENOUS at 12:01

## 2019-01-06 RX ADMIN — VANCOMYCIN HYDROCHLORIDE 1500 MG: 1 INJECTION, POWDER, LYOPHILIZED, FOR SOLUTION INTRAVENOUS at 11:01

## 2019-01-06 RX ADMIN — NOREPINEPHRINE-DEXTROSE IV SOLUTION 4 MG/250ML-5% 0.18 MCG/KG/MIN: 4-5/250 SOLUTION at 12:01

## 2019-01-06 RX ADMIN — NOREPINEPHRINE-DEXTROSE IV SOLUTION 4 MG/250ML-5% 0.14 MCG/KG/MIN: 4-5/250 SOLUTION at 03:01

## 2019-01-06 RX ADMIN — FUROSEMIDE 100 MG: 10 INJECTION, SOLUTION INTRAMUSCULAR; INTRAVENOUS at 08:01

## 2019-01-06 RX ADMIN — CEFEPIME HYDROCHLORIDE 1 G: 1 INJECTION, SOLUTION INTRAVENOUS at 07:01

## 2019-01-06 RX ADMIN — ACETAMINOPHEN 1000 MG: 10 INJECTION, SOLUTION INTRAVENOUS at 02:01

## 2019-01-06 NOTE — HOSPITAL COURSE
Mr. Bhakta was admitted for respiratory failure thought to be due to CHF exacerbation. He was started on BiPAP and diuresis. He had no leukocytosis or fever at the time of presentation. CXR was consistent with pulmonary edema without evidence of consolidation on presentation. He had worsening respiratory failure the night following admission and required intubation. Upon intubation he was found to have a copious amount of thick, yellow sputum that was almost appearing purulent. He was started on broad spectrum antibiotics with cefepime and vancomycin. Respiratory stain with normal respiratory yung but cultures was NGTD. Blood cultures were negative final.  Episodes of Vtach noted and Cardiology consulted.  He as started on Dobutamine for better diuresis per Cards.  Patient extubated on 1/10 without further respiratory issues.  He completed full 7 days of Cefepime as per Pulmonary recommendations,  Dobutamine drip stopped as per Cardiology and Lasix was switched to oral regimen. His respiratory status continued to improve and he was assessed for possible O2 needs on the day of discharge and his sats were 90-91% on RA with activity and 93% on RA at rest. No O2 needed for discharge. Pt was arranged for home health with PT/OT and close follow up with PCP and Cardiology.

## 2019-01-06 NOTE — SIGNIFICANT EVENT
Pt in room 326 went into resp distress and required bipap. AB.278/63.3/117 on 10 Fio2 40%.  Settings increased to 20/10. AbG repeat in one hour. Temp 102.5F.  Blood and urine cultures. Sputum reviewed. Started on cefepime and vancomycin. Increased lasix 100mg IV BID and pending labs in am, likely decrease dose. Mcmahon being placed for strict I/Os and pt now on bipap. PPI IV while on BIPAP. Anesthesia at bedside and will follow up for possible intubation if needed.

## 2019-01-06 NOTE — CARE UPDATE
Cross-Cover Progress Note    I was called to Mr. Papito Bhakta's bedside by his nurse to evaluate him for hypotension and respiratory distress.  He was admitted earlier this morning for pulmonary edema due to acute heart failure and was later transferred to the ICU for worsened respiratory failure.    On my evaluation he was somnolent and somewhat arousable.  He was on BPAP with very poor air movement.  We obtained nd ABG which was significant for 7.299  58.7  92  28.8 on BPAP 20  10  40%.  I spoke to his son, daughter, and daughter-in-law (who is an RN) and explained to them my concerns with Mr. Bhakta's shock and respiratory failure.  They were emotional but understood the severity of his acute illness.  They said that he never spoke about his wishes in terms of intubation and heroic measures, and would think that he would like everything done for him.    I decided to start him on a norepinephrine infusion and intubate his trachea for hypercapnic respiratory failure and for airway protection.  During the intubation I was able to visualize copious amount of thick, yellow sputum, almost appearing purulent.  I also placed an arterial line and central venous catheter.  Please review the Procedure Note for details.    He is requiring high levels of sedative medications for adequate sedation.  I tried propofol initially but his blood pressure would not tolerate it.  I then switch to midazolam and fentanyl infusion for him to be adequately sedated.  I will consult Pulmonology for ventilator management.        Intubation  Date/Time: 1/5/2019 5:45 PM  Performed by: Libertad Faust MD  Authorized by: Libertad Faust MD   Consent Done: Emergent Situation  Indications: respiratory failure,  hypercapnia and  airway protection  Intubation method: video-assisted  Preoxygenation: BVM  Pretreatment medications: none  Sedatives: etomidate  Paralytic: succinylcholine  Laryngoscope size: Mac 3  Tube size: 7.5 mm  Tube type:  "cuffed  Number of attempts: 1  Cricoid pressure: no  Cords visualized: yes  Post-procedure assessment: CO2 detector  Breath sounds: equal and absent over the epigastrium  Cuff inflated: yes  ETT to teeth: 22 cm  Tube secured with: ETT zaidi  Chest x-ray interpreted by me.  Chest x-ray findings: endotracheal tube in appropriate position  Patient tolerance: Patient tolerated the procedure well with no immediate complications  Comments:   View: Cormack-Lehane Grade I  Cuff inflated to minimally-occlusive pressure  Teeth intact  Complications: Copious amount of thick, yellow sputum appreciated.    Arterial Line  Date/Time: 1/5/2019 8:30 PM  Performed by: Libertad Faust MD  Authorized by: Libertad Faust MD   Consent Done: Emergent Situation  Preparation: Patient was prepped and draped in the usual sterile fashion.  Indications: multiple ABGs, respiratory failure and hemodynamic monitoring  Location: right radial  Needle gauge: 20  Seldinger technique: Seldinger technique used  Number of attempts: 2  Estimated blood loss (mL): <5.  Specimens: No  Implants: No  Post-procedure: dressing applied  Patient tolerance: Patient tolerated the procedure well with no immediate complications  Comments: First attempt to the left radial artery failed as I could not cannulate with the catheter despite being able to advance the guidewire. Next attempt on the right radial artery was successful.    Central Line  Date/Time: 1/5/2019 10:08 PM  Performed by: Libertad Faust MD  Consent Done: Yes  Time out: Immediately prior to procedure a "time out" was called to verify the correct patient, procedure, equipment, support staff and site/side marked as required.  Indications: med administration and vascular access  Anesthesia: local infiltration    Anesthesia:  Local Anesthetic: lidocaine 1% without epinephrine  Anesthetic total: 2 mL  Preparation: skin prepped with ChloraPrep  Skin prep agent dried: skin prep agent completely dried prior to " procedure  Sterile barriers: all five maximum sterile barriers used - cap, mask, sterile gown, sterile gloves, and large sterile sheet  Hand hygiene: hand hygiene performed prior to central venous catheter insertion  Location details: right internal jugular  Catheter type: triple lumen  Catheter size: 7 Fr  Catheter Length: 16cm    Ultrasound guidance: yes  Vessel Caliber: large, patent, compressibility normal  Needle advanced into vessel with real time Ultrasound guidance.  Sterile sheath used.  Manometry: No   Number of attempts: 1  Assessment: placement verified by x-ray,  successful placement and no pneumothorax on x-ray  Technical procedures used: Seldinger  Complications: none  Estimated blood loss (mL): 10  Implants: No  Post-procedure: line sutured,  chlorhexidine patch,  sterile dressing applied and blood return through all ports  Complications: No        I will continue monitor the patient's progress throughout the night.          Critical Care Time: 120 minutes.          Libertad Faust M.D.  Staff Nocturnist  Department of Hospital Medicine  [facility]  Pager: [phone]

## 2019-01-06 NOTE — PROGRESS NOTES
Results reported to Dr Faust.   FIO2 decreased to 35%, VT increased to 550 per MD.   Results for COURT BUSTOS (MRN [id number]) as of 1/5/2019 22:39   Ref. Range 1/5/2019 21:19   POC PH Latest Ref Range: 7.35 - 7.45  7.312 (L)   POC PCO2 Latest Ref Range: 35 - 45 mmHg 56.6 (HH)   POC PO2 Latest Ref Range: 80 - 100 mmHg 129 (H)   POC BE Latest Ref Range: -2 to 2 mmol/L 1   POC HCO3 Latest Ref Range: 24 - 28 mmol/L 28.6 (H)   POC SATURATED O2 Latest Ref Range: 95 - 100 % 99   POC TCO2 Latest Ref Range: 23 - 27 mmol/L 30 (H)   FiO2 Unknown 40   Vt Unknown 500   PiP Unknown 29   PEEP Unknown 5   Sample Unknown ARTERIAL   DelSys Unknown Adult Vent   Allens Test Unknown N/A   Site Unknown Teo/UAC   Mode Unknown AC/PRVC   Rate Unknown 25

## 2019-01-06 NOTE — ASSESSMENT & PLAN NOTE
Diurese with IV Lasix. Also component of pneumonia. BNP is more elevated than previously. Monitor UOP.

## 2019-01-06 NOTE — PROGRESS NOTES
Pt recieved intubated on Servo i ventilator with the following settings;. PRVC 550 TV, 25 RR, 35% FI02,  +5 Peep.  Alarms are set and functioning, Ambu bag at bedside, Pt without distress RT will continue to monitor and wean as tolerated.

## 2019-01-06 NOTE — PROGRESS NOTES
"[facility] Medicine  Progress Note    Patient Name: Papito Bhakta  MRN: [id number]  Patient Class: IP- Inpatient   Admission Date: 1/5/2019  Length of Stay: 1 days  Attending Physician: Shira Damico MD  Primary Care Provider: Brynn To MD        Subjective:     Principal Problem:Acute pulmonary edema    HPI:  Mr. Bhakta is a 64 yo man with combined HF (EF 20%, G3DD), AICD in place, Hyperlipidemia, and Hypertension who presented to the ED c/o gradually worsening and severe (10/10) SOB with associated bilateral lower extremity swelling x1 week. He also reports a hematemesis and productive cough with sputum described as "like grits." He is not on supplementary O2 at home. He is compliant with his Lasix 80mg x2/day. He denies fever, chills, diaphoresis, nausea, emesis, diarrhea, abdominal pain, chest pain, back pain, difficulty urinating and rash. Patient was hypoxic upon arrival to 80%. He was started on supplemental oxygen via BiPAP. CXR w/ acute pulmonary edema. He was started on IV Lasix. He claims to be complaint with a low salt diet and Lasix.    Hospital Course:  Mr. Bhakta was admitted for respiratory failure thought to be due to CHF exacerbation. He was started on BiPAP and diuresis. He had no leukocytosis or fever at the time of presentation. CXR was consistent with pulmonary edema without evidence of consolidation on presentation. He had worsening respiratory failure the night following admission and required intubation. Upon intubation he was found to have a copious amount of thick, yellow sputum, almost appearing purulent. He had been started on broad spectrum antibiotics with cefepime and vancomycin. Resp culture growing G+ cocci in pairs and chains and GNR.    Interval History:  Intubated overnight. CXR with more apparent opacity at this time. Fever developed after admission.    Review of Systems   Unable to perform ROS: Intubated     Objective:     Vital Signs " (Most Recent):  Temp: 99.6 °F (37.6 °C) (01/06/19 1200)  Pulse: 62 (01/06/19 1200)  Resp: (!) 25 (01/06/19 1200)  BP: (!) 100/56 (01/06/19 1200)  SpO2: 100 % (01/06/19 1200) Vital Signs (24h Range):  Temp:  [98.8 °F (37.1 °C)-100.5 °F (38.1 °C)] 99.6 °F (37.6 °C)  Pulse:  [49-83] 62  Resp:  [20-50] 25  SpO2:  [93 %-100 %] 100 %  BP: ()/(42-84) 100/56  Arterial Line BP: ()/(47-86) 121/54     Weight: (!) 143 kg (315 lb 4.1 oz)  Body mass index is 37.38 kg/m².    Intake/Output Summary (Last 24 hours) at 1/6/2019 1356  Last data filed at 1/6/2019 1300  Gross per 24 hour   Intake 2486.41 ml   Output 1625 ml   Net 861.41 ml      Physical Exam   Constitutional: He appears well-developed and well-nourished.   HENT:   Right Ear: External ear normal.   Left Ear: External ear normal.   Nose: Nose normal.   ET/OG tubes in place   Eyes: Conjunctivae are normal. Right eye exhibits no discharge.   Neck: JVD present.   Cardiovascular: Normal rate and normal heart sounds.   No murmur heard.  Pulmonary/Chest: He has no wheezes. He has rales.   Mechanically ventilated. Coarse throughout, particularly over right base.   Abdominal: Soft. Bowel sounds are normal. He exhibits no distension. There is no tenderness.   Musculoskeletal: He exhibits edema (1+ BLE). He exhibits no deformity.   Neurological:   Sedated, withdraws to pain, agitated and pulls at ET tube when sedation weaned.   Skin: Skin is warm and dry.       Significant Labs: All pertinent labs within the past 24 hours have been reviewed.    Significant Imaging: I have reviewed and interpreted all pertinent imaging results/findings within the past 24 hours.    Assessment/Plan:      * Acute pulmonary edema    Diurese with IV Lasix. Also component of pneumonia. BNP is more elevated than previously. Monitor UOP.     CAP (community acquired pneumonia)    Empiric abx with Vanc/Cefepime. Resp Cx polymicrobial. Follow cultures.     Encephalopathy, metabolic    Agitated and  required sedation as he was reaching for ET tube. 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 Wean sedation as tolerated.     Hyperlipidemia    On statin.     Acute respiratory failure with hypoxia    Intubated for respiratory failure due to CHF exacerbation and CAP. Pulm/CC consulted. No wheezes on exam. Broad spectrum abx. Resp Cx polymicrobial. BCx NGTD. Diurese with IV Lasix. SBT daily.     Acute on chronic combined systolic and diastolic congestive heart failure    EF of 20% and grade 3 diastolic CHF on echo on 11/2018. Diurese with IV Lasix.      BMI 40.0-44.9, adult    Weight lose as out patient.     Essential hypertension    Hold parameters placed for home Entresto and Coreg. Given IV Lasix for diuresis.         VTE Risk Mitigation (From admission, onward)        Ordered     enoxaparin injection 40 mg  Daily      01/05/19 1036     IP VTE HIGH RISK PATIENT  Once      01/05/19 1037          Critical care time spent on the evaluation and treatment of severe organ dysfunction, review of pertinent labs and imaging studies, discussions with consulting providers and discussions with patient/family: 45 minutes.    Shira Damico MD  Department of Hospital Medicine   [facility]

## 2019-01-06 NOTE — PLAN OF CARE
Problem: Adult Inpatient Plan of Care  Goal: Patient-Specific Goal (Individualization)  Outcome: Ongoing (interventions implemented as appropriate)  Pt continues in ICU on vent to 7.5 ET 22 cm at lip set to PRVC, 35% FiO2, 25 RR, 550 TV, +5 PEEP. Versed gtt and Fentanyl gtt for sedation. Norepinephrine gtt to maintain MAP > 65. Adequate urine output. Soft wrist restraints with order good until 2346 tonight. RIJ TLC central line and R Art line placed by MD. OG placed by RN. No new injury of fall noted. Pt shows no signs or symptoms of distress.

## 2019-01-06 NOTE — SUBJECTIVE & OBJECTIVE
Interval History:  Intubated overnight. CXR with more apparent opacity at this time. Fever developed after admission.    Review of Systems   Unable to perform ROS: Intubated     Objective:     Vital Signs (Most Recent):  Temp: 99.6 °F (37.6 °C) (01/06/19 1200)  Pulse: 62 (01/06/19 1200)  Resp: (!) 25 (01/06/19 1200)  BP: (!) 100/56 (01/06/19 1200)  SpO2: 100 % (01/06/19 1200) Vital Signs (24h Range):  Temp:  [98.8 °F (37.1 °C)-100.5 °F (38.1 °C)] 99.6 °F (37.6 °C)  Pulse:  [49-83] 62  Resp:  [20-50] 25  SpO2:  [93 %-100 %] 100 %  BP: ()/(42-84) 100/56  Arterial Line BP: ()/(47-86) 121/54     Weight: (!) 143 kg (315 lb 4.1 oz)  Body mass index is 37.38 kg/m².    Intake/Output Summary (Last 24 hours) at 1/6/2019 1356  Last data filed at 1/6/2019 1300  Gross per 24 hour   Intake 2486.41 ml   Output 1625 ml   Net 861.41 ml      Physical Exam   Constitutional: He appears well-developed and well-nourished.   HENT:   Right Ear: External ear normal.   Left Ear: External ear normal.   Nose: Nose normal.   ET/OG tubes in place   Eyes: Conjunctivae are normal. Right eye exhibits no discharge.   Neck: JVD present.   Cardiovascular: Normal rate and normal heart sounds.   No murmur heard.  Pulmonary/Chest: He has no wheezes. He has rales.   Mechanically ventilated. Coarse throughout, particularly over right base.   Abdominal: Soft. Bowel sounds are normal. He exhibits no distension. There is no tenderness.   Musculoskeletal: He exhibits edema (1+ BLE). He exhibits no deformity.   Neurological:   Sedated, withdraws to pain, agitated and pulls at ET tube when sedation weaned.   Skin: Skin is warm and dry.       Significant Labs: All pertinent labs within the past 24 hours have been reviewed.    Significant Imaging: I have reviewed and interpreted all pertinent imaging results/findings within the past 24 hours.

## 2019-01-06 NOTE — CONSULTS
[facility]  Pulmonology  Consult Note    Patient Name: Papito Bhakta  MRN: [id number]  Admission Date: 2019  Hospital Length of Stay: 1 days  Code Status: Full Code  Attending Physician: Shira Damico MD  Primary Care Provider: Brynn To MD   Principal Problem: Acute pulmonary edema    Inpatient consult to Pulmonology  Consult performed by: David Trivedi MD  Consult ordered by: Libertad Faust MD        Subjective:     HPI:  Patient is 63 y.o. male  has a past medical history of CHF (congestive heart failure), Hyperlipidemia, and Hypertension. presented to [facility] on 18 with worsening sob.  Patient was diagnosed with decompensated chf.  Patient was treated with diuresis along nippv.  Patient with worsening of dypsnea, hypotention.  Patient was intubated overnight.  Pulmonary was consulted for vent management.    Currently on 35% fio2 and off pressor.  Patient is sedated on fentanyl and versed.        Past Medical History:   Diagnosis Date    CHF (congestive heart failure)     Hyperlipidemia     Hypertension        Past Surgical History:   Procedure Laterality Date    Heart Cath-Bilateral Bilateral 2018    Performed by [facility]ail[facility] at [facility]    TESTICLE SURGERY         Review of patient's allergies indicates:  No Known Allergies    Family History     Problem Relation (Age of Onset)    Epilepsy Mother        Tobacco Use    Smoking status: Former Smoker     Packs/day: 0.50     Years: 40.00     Pack years: 20.00     Types: Cigarettes, Cigars     Last attempt to quit:      Years since quittin.0    Smokeless tobacco: Never Used   Substance and Sexual Activity    Alcohol use: Yes     Comment: once a month    Drug use: No    Sexual activity: Yes     Birth control/protection: None     Comment: uses protection sometimes         Review of Systems   Unable to perform ROS: Intubated     Objective:     Vital Signs (Most Recent):  Temp: 99.6 °F (37.6  °C) (01/06/19 1200)  Pulse: 62 (01/06/19 1200)  Resp: (!) 25 (01/06/19 1200)  BP: (!) 100/56 (01/06/19 1200)  SpO2: 100 % (01/06/19 1200) Vital Signs (24h Range):  Temp:  [98.8 °F (37.1 °C)-100.5 °F (38.1 °C)] 99.6 °F (37.6 °C)  Pulse:  [49-83] 62  Resp:  [20-50] 25  SpO2:  [93 %-100 %] 100 %  BP: ()/(42-84) 100/56  Arterial Line BP: ()/(47-86) 121/54     Weight: (!) 143 kg (315 lb 4.1 oz)  Body mass index is 37.38 kg/m².      Intake/Output Summary (Last 24 hours) at 1/6/2019 1308  Last data filed at 1/6/2019 1300  Gross per 24 hour   Intake 2486.41 ml   Output 1625 ml   Net 861.41 ml       Physical Exam   Constitutional: He appears well-developed.   HENT:   Head: Normocephalic and atraumatic.   Mouth/Throat: Oropharynx is clear and moist.   ETT in place   Eyes: Conjunctivae and EOM are normal. Pupils are equal, round, and reactive to light.   Neck: Normal range of motion. Neck supple.   Cardiovascular: Normal rate, regular rhythm and normal heart sounds.   Pulmonary/Chest: Effort normal and breath sounds normal.   Abdominal: Soft. Bowel sounds are normal.   Genitourinary:   Genitourinary Comments: Mcmahon in place   Musculoskeletal: Normal range of motion.   Neurological: No cranial nerve deficit.   Sedated on vent.   Skin: Skin is warm.   Nursing note and vitals reviewed.      Vents:  Vent Mode: PRVC (01/06/19 1100)  Ventilator Initiated: Yes (01/05/19 1945)  Set Rate: 25 bmp (01/06/19 1100)  Vt Set: 550 mL (01/06/19 1100)  PEEP/CPAP: 5 cmH20 (01/06/19 1100)  Oxygen Concentration (%): 35 (01/06/19 1200)  Peak Airway Pressure: 27.6 cmH2O (01/06/19 1100)  Total Ve: 14 mL (01/06/19 1100)  F/VT Ratio<105 (RSBI): (!) 44.25 (01/06/19 1100)    Lines/Drains/Airways     Central Venous Catheter Line                 Tunneled Central Line Insertion/Assessment - Triple Lumen  01/05/19 2130 right internal jugular less than 1 day          Drain                 NG/OG Tube 01/05/19 2200 orogastric 12 Fr. Left mouth less  than 1 day         Urethral Catheter 01/05/19 1745 less than 1 day          Airway                 Airway - Non-Surgical 01/05/19 1945 Endotracheal Tube less than 1 day          Arterial Line                 Arterial Line 01/05/19 2030 Right Radial less than 1 day          Peripheral Intravenous Line                 Peripheral IV - Single Lumen 01/05/19 0837 Right Antecubital 1 day         Peripheral IV - Single Lumen 01/05/19 1700 Anterior;Left Wrist less than 1 day                Significant Labs:    CBC/Anemia Profile:  Recent Labs   Lab 01/05/19  0837 01/06/19  0441   WBC 5.49 5.03   HGB 12.4* 11.8*   HCT 39.0* 35.3*    184   MCV 79* 82   RDW 14.9* 14.8*        Chemistries:  Recent Labs   Lab 01/05/19  0837 01/06/19  0441    140   K 3.6 3.0*    102   CO2 27 29   BUN 18 17   CREATININE 1.1 1.1   CALCIUM 8.9 8.7   ALBUMIN 3.0*  --    PROT 7.5  --    BILITOT 0.8  --    ALKPHOS 69  --    ALT 23  --    AST 28  --        ABGs:   Recent Labs   Lab 01/06/19  0410   PH 7.545*   PCO2 31.0*   HCO3 26.8   POCSATURATED 98   BE 5     Echo 11/5/18  · Left ventricle ejection fraction is severely decreased at 20%  · The left ventricle cavity is mildly dilated.  · Left ventricle shows eccentric hypertrophy.  · Grade III (severe) left ventricular diastolic dysfunction consistent with restrictive physiology.  · LA pressure is elevated.  · RV systolic function is severely reduced.  · Left atrium is severely dilated.  · Moderately elevated central venous pressure (8 mm Hg).  · The estimated PA systolic pressure is 31.81 mm Hg       Significant Imaging:   CXR: I have reviewed all pertinent results/findings within the past 24 hours and my personal findings are:  airspace disease bilaterally.    Assessment/Plan:     CAP (community acquired pneumonia)    On broad spectrum antibiotic.  Sputum culture with gnr.  Await speciation.  Send for procal to help with decision in regard to de-escalation.       Acute respiratory  failure with hypoxia    abg normalized with vent.  Still with airspace disease on cxr.  Will monitor with serial cxr.  Hopefully, aeration will improve.  sbt in am.     Acute on chronic combined systolic and diastolic congestive heart failure    Currently on lasix, bb.  Will d/c coreg due to tenous bp and recent levophed requirement.             Thank you for your consult. I will follow-up with patient. Please contact us if you have any additional questions.     David Trivedi MD  Pulmonology  [facility]    Critical Care Time: 35  minutes  Critical secondary to respiratory failure     Critical care was time spent personally by me on the following activities: development of treatment plan with patient or surrogate and bedside caregivers, discussions with consultants, evaluation of patient's response to treatment, examination of patient, ordering and performing treatments and interventions, ordering and review of laboratory studies, ordering and review of radiographic studies, pulse oximetry, re-evaluation of patient's condition.  This critical care time did not overlap with that of any other provider or involve time for any procedures.

## 2019-01-06 NOTE — ASSESSMENT & PLAN NOTE
On broad spectrum antibiotic.  Sputum culture with gnr.  Await speciation.  Send for procal to help with decision in regard to de-escalation.

## 2019-01-06 NOTE — ASSESSMENT & PLAN NOTE
Intubated for respiratory failure due to CHF exacerbation and CAP. Pulm/CC consulted. No wheezes on exam. Broad spectrum abx. Resp Cx polymicrobial. BCx NGTD. Diurese with IV Lasix. SBT daily.

## 2019-01-06 NOTE — ASSESSMENT & PLAN NOTE
abg normalized with vent.  Still with airspace disease on cxr.  Will monitor with serial cxr.  Hopefully, aeration will improve.  sbt in am.

## 2019-01-06 NOTE — PROGRESS NOTES
Results for COURT BUSTOS (MRN [id number]) as of 1/6/2019 05:23   Ref. Range 1/6/2019 04:10   POC PH Latest Ref Range: 7.35 - 7.45  7.545 (H)   POC PCO2 Latest Ref Range: 35 - 45 mmHg 31.0 (L)   POC PO2 Latest Ref Range: 80 - 100 mmHg 88   POC BE Latest Ref Range: -2 to 2 mmol/L 5   POC HCO3 Latest Ref Range: 24 - 28 mmol/L 26.8   POC SATURATED O2 Latest Ref Range: 95 - 100 % 98   POC TCO2 Latest Ref Range: 23 - 27 mmol/L 28 (H)   FiO2 Unknown 35   Vt Unknown 550   PiP Unknown 31   PEEP Unknown 5   Sample Unknown ARTERIAL   DelSys Unknown Adult Vent   Allens Test Unknown N/A   Site Unknown Teo/UAC   Mode Unknown AC/PRVC   Rate Unknown 25

## 2019-01-06 NOTE — PROGRESS NOTES
Pt intubated by Dr Faust with 7.5 ETT secured with ET [place] at 22cm at the teeth. Ambu bag and mask used. Co2 detector used. Set up on Servo I vent with settings PRVC, Rate 25, , PEEP 5, FIO2 40%. All alarms on and functioning properly.

## 2019-01-06 NOTE — PROGRESS NOTES
Results reported to Dr Faust.   Results for COURT BUSTOS (MRN [id number]) as of 1/5/2019 22:39   Ref. Range 1/5/2019 19:30   POC PH Latest Ref Range: 7.35 - 7.45  7.299 (L)   POC PCO2 Latest Ref Range: 35 - 45 mmHg 58.7 (HH)   POC PO2 Latest Ref Range: 80 - 100 mmHg 92   POC BE Latest Ref Range: -2 to 2 mmol/L 1   POC HCO3 Latest Ref Range: 24 - 28 mmol/L 28.8 (H)   POC SATURATED O2 Latest Ref Range: 95 - 100 % 96   POC TCO2 Latest Ref Range: 23 - 27 mmol/L 31 (H)   FiO2 Unknown 40   Sample Unknown ARTERIAL   DelSys Unknown CPAP/BiPAP   Allens Test Unknown Pass   Site Unknown Teo/UAC   Mode Unknown BiPAP   Rate Unknown 20

## 2019-01-06 NOTE — PLAN OF CARE
Problem: Adult Inpatient Plan of Care  Goal: Plan of Care Review  Outcome: Ongoing (interventions implemented as appropriate)  Plan of care reviewed. Pt. Remains on ventilator. IV Levophed running at 0.04 mcg, and IV Fentanyl at 25 mcg. Continues with soft wrist restraints r/t attempting to grab at tubing/medical devices. Pt. Has thick tan/white secretions suctioned as needed. HR has been SB/SR on cardiac monitor. Tmax 100.5 axillary. Family at bedside and questions answered. No falls, or hospital acquired injuries this shift.

## 2019-01-06 NOTE — PLAN OF CARE
01/06/19 1458   Discharge Assessment   Assessment information obtained from? Other  ([id number] Jr [id number])   Expected Length of Stay (days) 3   Communicated expected length of stay with patient/caregiver yes   Prior to hospitilization cognitive status: Alert/Oriented   Prior to hospitalization functional status: Independent   Current cognitive status: Coma/Sedated/Intubated   Current Functional Status: Completely Dependent   Lives With child(sourav), adult   Able to Return to Prior Arrangements yes   Is patient able to care for self after discharge? Unable to determine at this time (comments)   Who are your caregiver(s) and their phone number(s)? daughterAnnie and son Papito Draper   Patient's perception of discharge disposition home health   Readmission Within the Last 30 Days no previous admission in last 30 days   Patient currently being followed by outpatient case management? No   Patient currently receives any other outside agency services? No   Equipment Currently Used at Home none   Do you have any problems affording any of your prescribed medications? No   Is the patient taking medications as prescribed? yes   Does the patient have transportation home? Yes   Transportation Anticipated family or friend will provide   Does the patient receive services at the Coumadin Clinic? No   Discharge Plan A Home Health   Discharge Plan B Skilled Nursing Facility   Patient/Family in Agreement with Plan yes   To patient's room to discuss patient managing his care at home.      TN Role Explained.  Patient identified by using 2 identifiers:  Name and date of birth    Patient stated that he and his sister WILL HELP AT HOME WITH his RECOVERY.      Preferred Pharmacy:  [facility]

## 2019-01-06 NOTE — ASSESSMENT & PLAN NOTE
Agitated and required sedation as he was reaching for ET tube. 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 Wean sedation as tolerated.

## 2019-01-06 NOTE — HPI
Patient is 63 y.o. male  has a past medical history of CHF (congestive heart failure), Hyperlipidemia, and Hypertension. presented to [facility] on 1/5/18 with worsening sob.  Patient was diagnosed with decompensated chf.  Patient was treated with diuresis along nippv.  Patient with worsening of dypsnea, hypotention.  Patient was intubated overnight.  Pulmonary was consulted for vent management.    Currently on 35% fio2 and off pressor.  Patient is sedated on fentanyl and versed.

## 2019-01-06 NOTE — SUBJECTIVE & OBJECTIVE
Past Medical History:   Diagnosis Date    CHF (congestive heart failure)     Hyperlipidemia     Hypertension        Past Surgical History:   Procedure Laterality Date    Heart Cath-Bilateral Bilateral 2018    Performed by Shailesh Eng MD at [facility] CATH LAB    TESTICLE SURGERY         Review of patient's allergies indicates:  No Known Allergies    Family History     Problem Relation (Age of Onset)    Epilepsy Mother        Tobacco Use    Smoking status: Former Smoker     Packs/day: 0.50     Years: 40.00     Pack years: 20.00     Types: Cigarettes, Cigars     Last attempt to quit: 2014     Years since quittin.0    Smokeless tobacco: Never Used   Substance and Sexual Activity    Alcohol use: Yes     Comment: once a month    Drug use: No    Sexual activity: Yes     Birth control/protection: None     Comment: uses protection sometimes         Review of Systems   Unable to perform ROS: Intubated     Objective:     Vital Signs (Most Recent):  Temp: 99.6 °F (37.6 °C) (19 1200)  Pulse: 62 (19 1200)  Resp: (!) 25 (19 1200)  BP: (!) 100/56 (19 1200)  SpO2: 100 % (19 1200) Vital Signs (24h Range):  Temp:  [98.8 °F (37.1 °C)-100.5 °F (38.1 °C)] 99.6 °F (37.6 °C)  Pulse:  [49-83] 62  Resp:  [20-50] 25  SpO2:  [93 %-100 %] 100 %  BP: ()/(42-84) 100/56  Arterial Line BP: ()/(47-86) 121/54     Weight: (!) 143 kg (315 lb 4.1 oz)  Body mass index is 37.38 kg/m².      Intake/Output Summary (Last 24 hours) at 2019 1308  Last data filed at 2019 1300  Gross per 24 hour   Intake 2486.41 ml   Output 1625 ml   Net 861.41 ml       Physical Exam   Constitutional: He appears well-developed.   HENT:   Head: Normocephalic and atraumatic.   Mouth/Throat: Oropharynx is clear and moist.   ETT in place   Eyes: Conjunctivae and EOM are normal. Pupils are equal, round, and reactive to light.   Neck: Normal range of motion. Neck supple.   Cardiovascular: Normal rate, regular rhythm  and normal heart sounds.   Pulmonary/Chest: Effort normal and breath sounds normal.   Abdominal: Soft. Bowel sounds are normal.   Genitourinary:   Genitourinary Comments: Mcmahon in place   Musculoskeletal: Normal range of motion.   Neurological: No cranial nerve deficit.   Sedated on vent.   Skin: Skin is warm.   Nursing note and vitals reviewed.      Vents:  Vent Mode: PRVC (01/06/19 1100)  Ventilator Initiated: Yes (01/05/19 1945)  Set Rate: 25 bmp (01/06/19 1100)  Vt Set: 550 mL (01/06/19 1100)  PEEP/CPAP: 5 cmH20 (01/06/19 1100)  Oxygen Concentration (%): 35 (01/06/19 1200)  Peak Airway Pressure: 27.6 cmH2O (01/06/19 1100)  Total Ve: 14 mL (01/06/19 1100)  F/VT Ratio<105 (RSBI): (!) 44.25 (01/06/19 1100)    Lines/Drains/Airways     Central Venous Catheter Line                 Tunneled Central Line Insertion/Assessment - Triple Lumen  01/05/19 2130 right internal jugular less than 1 day          Drain                 NG/OG Tube 01/05/19 2200 orogastric 12 Fr. Left mouth less than 1 day         Urethral Catheter 01/05/19 1745 less than 1 day          Airway                 Airway - Non-Surgical 01/05/19 1945 Endotracheal Tube less than 1 day          Arterial Line                 Arterial Line 01/05/19 2030 Right Radial less than 1 day          Peripheral Intravenous Line                 Peripheral IV - Single Lumen 01/05/19 0837 Right Antecubital 1 day         Peripheral IV - Single Lumen 01/05/19 1700 Anterior;Left Wrist less than 1 day                Significant Labs:    CBC/Anemia Profile:  Recent Labs   Lab 01/05/19  0837 01/06/19  0441   WBC 5.49 5.03   HGB 12.4* 11.8*   HCT 39.0* 35.3*    184   MCV 79* 82   RDW 14.9* 14.8*        Chemistries:  Recent Labs   Lab 01/05/19  0837 01/06/19  0441    140   K 3.6 3.0*    102   CO2 27 29   BUN 18 17   CREATININE 1.1 1.1   CALCIUM 8.9 8.7   ALBUMIN 3.0*  --    PROT 7.5  --    BILITOT 0.8  --    ALKPHOS 69  --    ALT 23  --    AST 28  --        ABGs:    Recent Labs   Lab 01/06/19  0410   PH 7.545*   PCO2 31.0*   HCO3 26.8   POCSATURATED 98   BE 5     Echo 11/5/18  · Left ventricle ejection fraction is severely decreased at 20%  · The left ventricle cavity is mildly dilated.  · Left ventricle shows eccentric hypertrophy.  · Grade III (severe) left ventricular diastolic dysfunction consistent with restrictive physiology.  · LA pressure is elevated.  · RV systolic function is severely reduced.  · Left atrium is severely dilated.  · Moderately elevated central venous pressure (8 mm Hg).  · The estimated PA systolic pressure is 31.81 mm Hg       Significant Imaging:   CXR: I have reviewed all pertinent results/findings within the past 24 hours and my personal findings are:  airspace disease bilaterally.

## 2019-01-07 PROBLEM — R57.9 SHOCK: Status: ACTIVE | Noted: 2019-01-07

## 2019-01-07 LAB
ALLENS TEST: ABNORMAL
ALLENS TEST: ABNORMAL
ANION GAP SERPL CALC-SCNC: 10 MMOL/L
ANION GAP SERPL CALC-SCNC: 9 MMOL/L
BACTERIA SPEC AEROBE CULT: NORMAL
BACTERIA SPEC AEROBE CULT: NORMAL
BACTERIA UR CULT: NO GROWTH
BASOPHILS # BLD AUTO: 0.08 K/UL
BASOPHILS NFR BLD: 1 %
BUN SERPL-MCNC: 12 MG/DL
BUN SERPL-MCNC: 12 MG/DL
CALCIUM SERPL-MCNC: 8.5 MG/DL
CALCIUM SERPL-MCNC: 8.6 MG/DL
CHLORIDE SERPL-SCNC: 100 MMOL/L
CHLORIDE SERPL-SCNC: 100 MMOL/L
CO2 SERPL-SCNC: 28 MMOL/L
CO2 SERPL-SCNC: 29 MMOL/L
CREAT SERPL-MCNC: 0.9 MG/DL
CREAT SERPL-MCNC: 1 MG/DL
DELSYS: ABNORMAL
DELSYS: ABNORMAL
DIFFERENTIAL METHOD: ABNORMAL
EOSINOPHIL # BLD AUTO: 0.1 K/UL
EOSINOPHIL NFR BLD: 1 %
ERYTHROCYTE [DISTWIDTH] IN BLOOD BY AUTOMATED COUNT: 14.6 %
ERYTHROCYTE [SEDIMENTATION RATE] IN BLOOD BY WESTERGREN METHOD: 16 MM/H
ERYTHROCYTE [SEDIMENTATION RATE] IN BLOOD BY WESTERGREN METHOD: 25 MM/H
EST. GFR  (AFRICAN AMERICAN): >60 ML/MIN/1.73 M^2
EST. GFR  (AFRICAN AMERICAN): >60 ML/MIN/1.73 M^2
EST. GFR  (NON AFRICAN AMERICAN): >60 ML/MIN/1.73 M^2
EST. GFR  (NON AFRICAN AMERICAN): >60 ML/MIN/1.73 M^2
FIO2: 30
FIO2: 35
GLUCOSE SERPL-MCNC: 121 MG/DL
GLUCOSE SERPL-MCNC: 130 MG/DL
GRAM STN SPEC: NORMAL
HCO3 UR-SCNC: 26 MMOL/L (ref 24–28)
HCO3 UR-SCNC: 28.4 MMOL/L (ref 24–28)
HCT VFR BLD AUTO: 34.8 %
HGB BLD-MCNC: 11.8 G/DL
LYMPHOCYTES # BLD AUTO: 1.5 K/UL
LYMPHOCYTES NFR BLD: 19 %
MAGNESIUM SERPL-MCNC: 1.8 MG/DL
MAGNESIUM SERPL-MCNC: 1.9 MG/DL
MCH RBC QN AUTO: 27 PG
MCHC RBC AUTO-ENTMCNC: 33.9 G/DL
MCV RBC AUTO: 80 FL
MODE: ABNORMAL
MODE: ABNORMAL
MONOCYTES # BLD AUTO: 0.8 K/UL
MONOCYTES NFR BLD: 10.4 %
NEUTROPHILS # BLD AUTO: 5.5 K/UL
NEUTROPHILS NFR BLD: 69 %
PCO2 BLDA: 33 MMHG (ref 35–45)
PCO2 BLDA: 46.9 MMHG (ref 35–45)
PEEP: 5
PEEP: 8
PH SMN: 7.39 [PH] (ref 7.35–7.45)
PH SMN: 7.5 [PH] (ref 7.35–7.45)
PLATELET # BLD AUTO: 204 K/UL
PMV BLD AUTO: 11.8 FL
PO2 BLDA: 38 MMHG (ref 40–60)
PO2 BLDA: 84 MMHG (ref 80–100)
POC BE: 3 MMOL/L
POC BE: 3 MMOL/L
POC SATURATED O2: 70 % (ref 95–100)
POC SATURATED O2: 97 % (ref 95–100)
POC TCO2: 27 MMOL/L (ref 23–27)
POC TCO2: 30 MMOL/L (ref 24–29)
POTASSIUM SERPL-SCNC: 3.4 MMOL/L
POTASSIUM SERPL-SCNC: 3.6 MMOL/L
POTASSIUM SERPL-SCNC: 3.8 MMOL/L
RBC # BLD AUTO: 4.37 M/UL
SAMPLE: ABNORMAL
SAMPLE: ABNORMAL
SITE: ABNORMAL
SITE: ABNORMAL
SODIUM SERPL-SCNC: 138 MMOL/L
SODIUM SERPL-SCNC: 138 MMOL/L
SP02: 95
VT: 530
VT: 550
WBC # BLD AUTO: 8.04 K/UL

## 2019-01-07 PROCEDURE — 84132 ASSAY OF SERUM POTASSIUM: CPT | Mod: HCNC

## 2019-01-07 PROCEDURE — 25000003 PHARM REV CODE 250: Mod: HCNC | Performed by: INTERNAL MEDICINE

## 2019-01-07 PROCEDURE — 99292 CRITICAL CARE ADDL 30 MIN: CPT | Mod: HCNC,,, | Performed by: EMERGENCY MEDICINE

## 2019-01-07 PROCEDURE — 80048 BASIC METABOLIC PNL TOTAL CA: CPT | Mod: HCNC

## 2019-01-07 PROCEDURE — 25000003 PHARM REV CODE 250: Mod: HCNC | Performed by: STUDENT IN AN ORGANIZED HEALTH CARE EDUCATION/TRAINING PROGRAM

## 2019-01-07 PROCEDURE — 85025 COMPLETE CBC W/AUTO DIFF WBC: CPT | Mod: HCNC

## 2019-01-07 PROCEDURE — 94003 VENT MGMT INPAT SUBQ DAY: CPT | Mod: HCNC

## 2019-01-07 PROCEDURE — 25000003 PHARM REV CODE 250: Mod: HCNC | Performed by: HOSPITALIST

## 2019-01-07 PROCEDURE — 99291 PR CRITICAL CARE, E/M 30-74 MINUTES: ICD-10-PCS | Mod: HCNC,,, | Performed by: NURSE PRACTITIONER

## 2019-01-07 PROCEDURE — 20000000 HC ICU ROOM: Mod: HCNC

## 2019-01-07 PROCEDURE — 80048 BASIC METABOLIC PNL TOTAL CA: CPT | Mod: 91,HCNC

## 2019-01-07 PROCEDURE — 25000003 PHARM REV CODE 250: Mod: HCNC | Performed by: EMERGENCY MEDICINE

## 2019-01-07 PROCEDURE — 83735 ASSAY OF MAGNESIUM: CPT | Mod: HCNC

## 2019-01-07 PROCEDURE — 94761 N-INVAS EAR/PLS OXIMETRY MLT: CPT | Mod: HCNC

## 2019-01-07 PROCEDURE — 99291 CRITICAL CARE FIRST HOUR: CPT | Mod: HCNC,,, | Performed by: NURSE PRACTITIONER

## 2019-01-07 PROCEDURE — 82803 BLOOD GASES ANY COMBINATION: CPT | Mod: HCNC

## 2019-01-07 PROCEDURE — 99900026 HC AIRWAY MAINTENANCE (STAT): Mod: HCNC

## 2019-01-07 PROCEDURE — 36415 COLL VENOUS BLD VENIPUNCTURE: CPT | Mod: HCNC

## 2019-01-07 PROCEDURE — 82330 ASSAY OF CALCIUM: CPT | Mod: HCNC

## 2019-01-07 PROCEDURE — 99900035 HC TECH TIME PER 15 MIN (STAT): Mod: HCNC

## 2019-01-07 PROCEDURE — 63600175 PHARM REV CODE 636 W HCPCS: Mod: HCNC | Performed by: NURSE PRACTITIONER

## 2019-01-07 PROCEDURE — 63600175 PHARM REV CODE 636 W HCPCS: Mod: HCNC | Performed by: EMERGENCY MEDICINE

## 2019-01-07 PROCEDURE — 27000221 HC OXYGEN, UP TO 24 HOURS: Mod: HCNC

## 2019-01-07 PROCEDURE — 63600175 PHARM REV CODE 636 W HCPCS: Mod: HCNC | Performed by: HOSPITALIST

## 2019-01-07 PROCEDURE — 36600 WITHDRAWAL OF ARTERIAL BLOOD: CPT | Mod: HCNC

## 2019-01-07 PROCEDURE — 83735 ASSAY OF MAGNESIUM: CPT | Mod: 91,HCNC

## 2019-01-07 PROCEDURE — 99292 PR CRITICAL CARE, ADDL 30 MIN: ICD-10-PCS | Mod: HCNC,,, | Performed by: EMERGENCY MEDICINE

## 2019-01-07 RX ORDER — POTASSIUM CHLORIDE 20 MEQ/15ML
40 SOLUTION ORAL EVERY 4 HOURS
Status: COMPLETED | OUTPATIENT
Start: 2019-01-07 | End: 2019-01-07

## 2019-01-07 RX ORDER — CEFEPIME HYDROCHLORIDE 1 G/50ML
1 INJECTION, SOLUTION INTRAVENOUS
Status: DISCONTINUED | OUTPATIENT
Start: 2019-01-07 | End: 2019-01-13

## 2019-01-07 RX ORDER — ASPIRIN 325 MG
325 TABLET ORAL DAILY
Status: DISCONTINUED | OUTPATIENT
Start: 2019-01-08 | End: 2019-01-13 | Stop reason: HOSPADM

## 2019-01-07 RX ORDER — POTASSIUM CHLORIDE 20 MEQ/1
40 TABLET, EXTENDED RELEASE ORAL EVERY 4 HOURS
Status: DISCONTINUED | OUTPATIENT
Start: 2019-01-07 | End: 2019-01-07

## 2019-01-07 RX ADMIN — VANCOMYCIN HYDROCHLORIDE 1500 MG: 1 INJECTION, POWDER, LYOPHILIZED, FOR SOLUTION INTRAVENOUS at 12:01

## 2019-01-07 RX ADMIN — PRAVASTATIN SODIUM 80 MG: 40 TABLET ORAL at 08:01

## 2019-01-07 RX ADMIN — FUROSEMIDE 100 MG: 10 INJECTION, SOLUTION INTRAMUSCULAR; INTRAVENOUS at 08:01

## 2019-01-07 RX ADMIN — NOREPINEPHRINE-DEXTROSE IV SOLUTION 4 MG/250ML-5% 0.04 MCG/KG/MIN: 4-5/250 SOLUTION at 08:01

## 2019-01-07 RX ADMIN — SPIRONOLACTONE 25 MG: 25 TABLET ORAL at 08:01

## 2019-01-07 RX ADMIN — CEFEPIME HYDROCHLORIDE 1 G: 1 INJECTION, SOLUTION INTRAVENOUS at 11:01

## 2019-01-07 RX ADMIN — ASPIRIN 325 MG ORAL TABLET 325 MG: 325 PILL ORAL at 08:01

## 2019-01-07 RX ADMIN — Medication 50 MCG/HR: at 06:01

## 2019-01-07 RX ADMIN — ACETAMINOPHEN 650 MG: 325 TABLET ORAL at 12:01

## 2019-01-07 RX ADMIN — VANCOMYCIN HYDROCHLORIDE 1500 MG: 1 INJECTION, POWDER, LYOPHILIZED, FOR SOLUTION INTRAVENOUS at 08:01

## 2019-01-07 RX ADMIN — NOREPINEPHRINE-DEXTROSE IV SOLUTION 4 MG/250ML-5% 0.1 MCG/KG/MIN: 4-5/250 SOLUTION at 02:01

## 2019-01-07 RX ADMIN — FUROSEMIDE 100 MG: 10 INJECTION, SOLUTION INTRAMUSCULAR; INTRAVENOUS at 05:01

## 2019-01-07 RX ADMIN — CHLORHEXIDINE GLUCONATE 0.12% ORAL RINSE 15 ML: 1.2 LIQUID ORAL at 08:01

## 2019-01-07 RX ADMIN — CEFEPIME HYDROCHLORIDE 1 G: 1 INJECTION, SOLUTION INTRAVENOUS at 02:01

## 2019-01-07 RX ADMIN — ACETAMINOPHEN 650 MG: 325 TABLET ORAL at 08:01

## 2019-01-07 RX ADMIN — SACUBITRIL AND VALSARTAN 2 TABLET: 24; 26 TABLET, FILM COATED ORAL at 08:01

## 2019-01-07 RX ADMIN — VANCOMYCIN HYDROCHLORIDE 1500 MG: 1 INJECTION, POWDER, LYOPHILIZED, FOR SOLUTION INTRAVENOUS at 03:01

## 2019-01-07 RX ADMIN — PANTOPRAZOLE SODIUM 40 MG: 40 GRANULE, DELAYED RELEASE ORAL at 08:01

## 2019-01-07 RX ADMIN — POTASSIUM CHLORIDE 40 MEQ: 20 SOLUTION ORAL at 01:01

## 2019-01-07 RX ADMIN — POTASSIUM CHLORIDE 40 MEQ: 20 SOLUTION ORAL at 05:01

## 2019-01-07 RX ADMIN — CEFEPIME HYDROCHLORIDE 1 G: 1 INJECTION, SOLUTION INTRAVENOUS at 07:01

## 2019-01-07 RX ADMIN — POTASSIUM PHOSPHATE, MONOBASIC AND POTASSIUM PHOSPHATE, DIBASIC 20 MMOL: 224; 236 INJECTION, SOLUTION, CONCENTRATE INTRAVENOUS at 01:01

## 2019-01-07 RX ADMIN — ENOXAPARIN SODIUM 40 MG: 100 INJECTION SUBCUTANEOUS at 05:01

## 2019-01-07 NOTE — ASSESSMENT & PLAN NOTE
Diurese with IV Lasix. Also component of pneumonia. BNP is more elevated than previously. Monitor UOP closely.

## 2019-01-07 NOTE — SUBJECTIVE & OBJECTIVE
"HPI:  Mr. Bhakta is a 64 yo man with combined HF (EF 20%) AICD in place, HTN  who presented to the ED c/o gradually worsening and severe  SOB with associated bilateral lower extremity swelling x1 week. He also reports a hematemesis and productive cough with sputum described as "like grits." He is not on supplementary O2 at home. He is compliant with his Lasix 80mg x2/day. He denies fever, chills, diaphoresis, nausea, emesis, diarrhea, abdominal pain, chest pain, back pain, difficulty urinating and rash. Patient was hypoxic upon arrival to 80%. He was started on supplemental oxygen via BiPAP. CXR w/ acute pulmonary edema. He was started on IV Lasix. He claims to be complaint with a low salt diet and Lasix.     Hospital Course:  Mr. Bhakta was admitted for respiratory failure thought to be due to CHF exacerbation. He was started on BiPAP and diuresis. He had no leukocytosis or fever at the time of presentation. CXR was consistent with pulmonary edema without evidence of consolidation on presentation. He had worsening respiratory failure the night following admission and required intubation. Upon intubation he was found to have a copious amount of thick, yellow sputum, almost appearing purulent. He had been started on broad spectrum antibiotics with cefepime and vancomycin. Resp culture growing G+ cocci in pairs and chains and GNR.    Interval History/Significant Events: failed SBT this AM per RN and RT with tachypnea, accessory muscle use. Afebrile overnight. Remains on low dose levophed.     Review of Systems   Unable to perform ROS: Intubated     Objective:     Vital Signs (Most Recent):  Temp: 99.9 °F (37.7 °C) (01/07/19 0715)  Pulse: 94 (01/07/19 0906)  Resp: (!) 47 (01/07/19 0906)  BP: 125/66 (01/07/19 0906)  SpO2: 95 % (01/07/19 0906) Vital Signs (24h Range):  Temp:  [98.8 °F (37.1 °C)-102.4 °F (39.1 °C)] 99.9 °F (37.7 °C)  Pulse:  [58-94] 94  Resp:  [23-52] 47  SpO2:  [95 %-100 %] 95 %  BP: ()/(54-90) " 125/66  Arterial Line BP: ()/(43-85) 129/56   Weight: (!) 141.4 kg (311 lb 11.7 oz)  Body mass index is 36.97 kg/m².      Intake/Output Summary (Last 24 hours) at 1/7/2019 1006  Last data filed at 1/7/2019 0858  Gross per 24 hour   Intake 2760.02 ml   Output 2720 ml   Net 40.02 ml       Physical Exam   Constitutional: He appears well-developed. He has a sickly appearance. He appears ill. No distress. He is intubated.   HENT:   Head: Normocephalic and atraumatic.   Eyes: Conjunctivae are normal. Pupils are equal, round, and reactive to light. Right eye exhibits no discharge. Left eye exhibits no discharge. No scleral icterus.   Neck: Normal range of motion. Neck supple. JVD: unable to assess; TLC in place. No tracheal deviation present. No thyromegaly present.   Cardiovascular: Normal rate, regular rhythm, S1 normal and S2 normal.   Pulses:       Radial pulses are 2+ on the right side, and 2+ on the left side.        Dorsalis pedis pulses are 1+ on the right side, and 1+ on the left side.   LE's cold distal to ankles bilaterally. 1+ DP pulses palpated bilaterally    Pulmonary/Chest: No accessory muscle usage. He is intubated. No respiratory distress. He has no decreased breath sounds. He has no wheezes. He has rales in the right upper field, the right middle field, the left upper field and the left middle field.   Abdominal: Soft. Bowel sounds are normal. He exhibits no distension. There is no tenderness. There is no guarding.   Lymphadenopathy:     He has no cervical adenopathy.   Neurological: GCS eye subscore is 3. GCS verbal subscore is 1. GCS motor subscore is 6.   RASS -1 on light sedation with fentanyl. Will open eyes, follow commands when prompted.    Skin: Skin is dry. He is not diaphoretic. There is pallor.       Vents:  Vent Mode: PRVC (01/07/19 0906)  Ventilator Initiated: Yes (01/05/19 1945)  Set Rate: 25 bmp (01/07/19 0906)  Vt Set: 550 mL (01/07/19 0906)  Pressure Support: 10 cmH20 (01/07/19  0906)  PEEP/CPAP: 5 cmH20 (01/07/19 0906)  Oxygen Concentration (%): 35 (01/07/19 0906)  Peak Airway Pressure: 27.2 cmH2O (01/07/19 0906)  Total Ve: 17 mL (01/07/19 0906)  F/VT Ratio<105 (RSBI): (!) 84.23 (01/07/19 0906)  Lines/Drains/Airways     Central Venous Catheter Line                 Tunneled Central Line Insertion/Assessment - Triple Lumen  01/05/19 2130 right internal jugular 1 day          Drain                 NG/OG Tube 01/05/19 2200 orogastric 12 Fr. Left mouth 1 day         Urethral Catheter 01/05/19 1745 1 day          Airway                 Airway - Non-Surgical 01/05/19 1945 Endotracheal Tube 1 day          Arterial Line                 Arterial Line 01/05/19 2030 Right Radial 1 day          Peripheral Intravenous Line                 Peripheral IV - Single Lumen 01/05/19 0837 Right Antecubital 2 days         Peripheral IV - Single Lumen 01/05/19 1700 Anterior;Left Wrist 1 day              Significant Labs:    CBC/Anemia Profile:  Recent Labs   Lab 01/06/19  0441 01/07/19  0421   WBC 5.03 8.04   HGB 11.8* 11.8*   HCT 35.3* 34.8*    204   MCV 82 80*   RDW 14.8* 14.6*        Chemistries:  Recent Labs   Lab 01/06/19  0441 01/06/19  2318 01/07/19  0421    137 138   K 3.0* 3.0* 3.4*    100 100   CO2 29 28 29   BUN 17 13 12   CREATININE 1.1 0.9 0.9   CALCIUM 8.7 8.4* 8.6*   MG  --  2.0 1.9   PHOS  --  1.4*  --      Significant Imaging:  I have reviewed all pertinent imaging results/findings within the past 24 hours.

## 2019-01-07 NOTE — ASSESSMENT & PLAN NOTE
Intubated for respiratory failure due to CHF exacerbation and CAP. Pulm/CC consulted. No wheezes on exam. Broad spectrum abx. Resp stain/Cx with resp yung but no bacterial growth on culture. BCx NGTD. Diurese with IV Lasix. SBT daily.

## 2019-01-07 NOTE — PLAN OF CARE
Problem: Adult Inpatient Plan of Care  Goal: Plan of Care Review  Recommendations     1. If unable to extubate pt w/in 24 hrs, rec TF initiation of Impact Peptide 1.5 @ 10 mL/hr; advance as tolerated to a goal rate of 55 mL/hr to provide 1980 cals, 124 g protein, & 1016 mL free fl     2. Additional free fl per MD discretion at this point     3. Otherwise, advance diet as tolerated s/p extubation       Goals: TF initiation or diet advancement w/in 24 hrs  Nutrition Goal Status: new

## 2019-01-07 NOTE — PROGRESS NOTES
"[facility] Medicine  Progress Note    Patient Name: Papito Bhakta  MRN: [id number]  Patient Class: IP- Inpatient   Admission Date: 1/5/2019  Length of Stay: 2 days  Attending Physician: Shira Damico MD  Primary Care Provider: Brynn To MD        Subjective:     Principal Problem:Acute respiratory failure with hypoxia    HPI:  Mr. Bhakta is a 64 yo man with combined HF (EF 20%, G3DD), AICD in place, Hyperlipidemia, and Hypertension who presented to the ED c/o gradually worsening and severe (10/10) SOB with associated bilateral lower extremity swelling x1 week. He also reports a hematemesis and productive cough with sputum described as "like grits." He is not on supplementary O2 at home. He is compliant with his Lasix 80mg x2/day. He denies fever, chills, diaphoresis, nausea, emesis, diarrhea, abdominal pain, chest pain, back pain, difficulty urinating and rash. Patient was hypoxic upon arrival to 80%. He was started on supplemental oxygen via BiPAP. CXR w/ acute pulmonary edema. He was started on IV Lasix. He claims to be complaint with a low salt diet and Lasix.    Hospital Course:  Mr. Bhakta was admitted for respiratory failure thought to be due to CHF exacerbation. He was started on BiPAP and diuresis. He had no leukocytosis or fever at the time of presentation. CXR was consistent with pulmonary edema without evidence of consolidation on presentation. He had worsening respiratory failure the night following admission and required intubation. Upon intubation he was found to have a copious amount of thick, yellow sputum that was almost appearing purulent. He had been started on broad spectrum antibiotics with cefepime and vancomycin. Resp stain with resp yung but NGTD. Blood cultures remain NGTD.    Interval History:  Failed SBT again today. Alert and much more redirectable when sedation weaned.    Review of Systems   Unable to perform ROS: Intubated     Objective: "     Vital Signs (Most Recent):  Temp: 99.9 °F (37.7 °C) (01/07/19 1500)  Pulse: 75 (01/07/19 1715)  Resp: (!) 22 (01/07/19 1715)  BP: 108/62 (01/07/19 1715)  SpO2: 96 % (01/07/19 1715) Vital Signs (24h Range):  Temp:  [98.8 °F (37.1 °C)-102.4 °F (39.1 °C)] 99.9 °F (37.7 °C)  Pulse:  [60-94] 75  Resp:  [18-52] 22  SpO2:  [90 %-100 %] 96 %  BP: ()/(52-90) 108/62  Arterial Line BP: ()/() 130/65     Weight: (!) 141.4 kg (311 lb 11.7 oz)  Body mass index is 36.97 kg/m².    Intake/Output Summary (Last 24 hours) at 1/7/2019 1748  Last data filed at 1/7/2019 1711  Gross per 24 hour   Intake 2562.52 ml   Output 3800 ml   Net -1237.48 ml      Physical Exam   Constitutional: He appears well-developed and well-nourished.   HENT:   Right Ear: External ear normal.   Left Ear: External ear normal.   Nose: Nose normal.   ET/OG tubes in place   Eyes: Conjunctivae are normal. Right eye exhibits no discharge.   Neck: JVD (decreasing) present.   Cardiovascular: Normal rate and normal heart sounds.   No murmur heard.  Pulmonary/Chest: He has no wheezes. He has rales.   Mechanically ventilated. Coarse particularly over right base, less so than previously   Abdominal: Soft. Bowel sounds are normal. He exhibits no distension. There is no tenderness.   Musculoskeletal: He exhibits edema (trace BLE ). He exhibits no deformity.   Neurological:   Alert, follows commands and nod appropriately   Skin: Skin is warm and dry.       Significant Labs: All pertinent labs within the past 24 hours have been reviewed.    Significant Imaging: I have reviewed and interpreted all pertinent imaging results/findings within the past 24 hours.    Assessment/Plan:      * Acute respiratory failure with hypoxia    Intubated for respiratory failure due to CHF exacerbation and CAP. Pulm/CC consulted. No wheezes on exam. Broad spectrum abx. Resp stain/Cx with resp yung but no bacterial growth on culture. BCx NGTD. Diurese with IV Lasix. SBT  daily.     Shock    Septic shock. Wean levophed as tolerated.     CAP (community acquired pneumonia)    Empiric abx with Vanc/Cefepime. Resp Cx polymicrobial. Follow cultures.     Encephalopathy, metabolic    Agitated and required sedation as he was reaching for ET tube. 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 Weaned sedation with improvement in mental status on 1/7/2019.     Hyperlipidemia    On statin.     Acute pulmonary edema    Diurese with IV Lasix. Also component of pneumonia. BNP is more elevated than previously. Monitor UOP closely.     Acute on chronic combined systolic and diastolic congestive heart failure    EF of 20% and grade 3 diastolic CHF on echo on 11/2018. Diurese with IV Lasix. Hold BB/Entresto while hypotensive.     BMI 40.0-44.9, adult    Weight lose as out patient.     Essential hypertension    Hold home Entresto and Coreg for shock requiring Levophed. Given IV Lasix for diuresis.         VTE Risk Mitigation (From admission, onward)        Ordered     enoxaparin injection 40 mg  Daily      01/05/19 1036     IP VTE HIGH RISK PATIENT  Once      01/05/19 1037          Critical care time spent on the evaluation and treatment of severe organ dysfunction, review of pertinent labs and imaging studies, discussions with consulting providers and discussions with patient/family: 30 minutes.    Shira Damico MD  Department of Hospital Medicine   [facility]

## 2019-01-07 NOTE — ASSESSMENT & PLAN NOTE
--presentation consistent with acute on chronic HF exacerbation/cardiogenic shock with pulmonary edema, hypoxemia, LE edema. However, given fever and pneumonia, septic shock could also be playing a role  --would continue levophed, titrate to MAP 65   --continue Abx as above for pneumonia  --continue diuresis as tolerated. Continue holding BB, ANRI

## 2019-01-07 NOTE — CONSULTS
"  [facility] [place], [place]  Adult Nutrition  Consult Note    SUMMARY     Recommendations    1. If unable to extubate pt w/in 24 hrs, rec TF initiation of Impact Peptide 1.5 @ 10 mL/hr; advance as tolerated to a goal rate of 55 mL/hr to provide 1980 cals, 124 g protein, & 1016 mL free fl     2. Additional free fl per MD discretion at this point     3. Otherwise, advance diet as tolerated s/p extubation      Goals: TF initiation or diet advancement w/in 24 hrs  Nutrition Goal Status: new  Communication of RD Recs: discussed on rounds    Reason for Assessment    Reason For Assessment: consult  Diagnosis: (acute respiratory failure)  Relevant Medical History: CHF, HLD, HTN  Interdisciplinary Rounds: attended  General Information Comments: Pt seen this AM intubated, sedated, on pressor support. Daughter @ bedside & reports pt was eating normally (good appetite) pta & had successfully lost ~15# intentionally pta due to needed diet changes. NFPE not performed today as pt w/ no indicators of malnutrition.   Nutrition Discharge Planning: Unable to determine @ this time.     Nutrition Risk Screen    Nutrition Risk Screen: no indicators present    Nutrition/Diet History    Patient Reported Diet/Restrictions/Preferences: low salt  Food Preferences: [place]  Spiritual, Cultural Beliefs, Bahai Practices, Values that Affect Care: no  Factors Affecting Nutritional Intake: on mechanical ventilation    Anthropometrics    Temp: 99.9 °F (37.7 °C)  Height Method: Stated  Height: 6' 5" (195.6 cm)  Height (inches): 77 in  Weight Method: Bed Scale  Weight: (!) 141.4 kg (311 lb 11.7 oz)  Weight (lb): (!) 311.73 lb  Ideal Body Weight (IBW), Male: 208 lb  % Ideal Body Weight, Male (lb): 149.87 lb  BMI (Calculated): 37  BMI Grade: 35 - 39.9 - obesity - grade II  Weight Loss: intentional  Usual Body Weight (UBW), kg: (!) 150 kg  % Usual Body Weight: 94.46  % Weight Change From Usual Weight: -5.73 %       Lab/Procedures/Meds    Pertinent " Labs Reviewed: reviewed  Pertinent Labs Comments: K 3.4, Glu 130, Phos 1.4  Pertinent Medications Reviewed: reviewed  Pertinent Medications Comments: fentanyl, levo, lasix, pantoprazole, KCl    Estimated/Assessed Needs    Weight Used For Calorie Calculations: (!) 141.4 kg (311 lb 11.7 oz)  Energy Calorie Requirements (kcal): 2375-1229  Energy Need Method: Kcal/kg(11-14 cals/kg)     Protein Requirements: 160 g (1.7 g/kg IBW)  Weight Used For Protein Calculations: 94.5 kg (208 lb 5.4 oz)(IBW)     Fluid Requirements (mL): 1mL/kcal or per MD  Estimated Fluid Requirement Method: RDA Method  RDA Method (mL): 1555         Nutrition Prescription Ordered    Current Diet Order: NPO    Evaluation of Received Nutrient/Fluid Intake    IV Fluid (mL): (IV meds noted)  I/O: reviewed  Energy Calories Required: not meeting needs  Protein Required: not meeting needs  Fluid Required: (per MD)  Comments: LBM 1/4; good uop  % Intake of Estimated Energy Needs: 0 - 25 %  % Meal Intake: NPO    Nutrition Risk    Level of Risk/Frequency of Follow-up: (F/u 2 x weekly)     Assessment and Plan    Nutrition Problem  Inadequate energy intake    Related to (etiology):   NPO w/ no alternate means of nutr in place    Signs and Symptoms (as evidenced by):   <85% of EEN/EPN being met    Interventions:  Collaboration of other providers     Nutrition Diagnosis Status:   New       Monitor and Evaluation    Food and Nutrient Intake: energy intake  Food and Nutrient Adminstration: diet order, enteral and parenteral nutrition administration  Anthropometric Measurements: weight change, weight  Biochemical Data, Medical Tests and Procedures: electrolyte and renal panel, glucose/endocrine profile  Nutrition-Focused Physical Findings: overall appearance     Nutrition Follow-Up    RD Follow-up?: Yes

## 2019-01-07 NOTE — SUBJECTIVE & OBJECTIVE
Interval History:  Failed SBT again today. Alert and much more redirectable when sedation weaned.    Review of Systems   Unable to perform ROS: Intubated     Objective:     Vital Signs (Most Recent):  Temp: 99.9 °F (37.7 °C) (01/07/19 1500)  Pulse: 75 (01/07/19 1715)  Resp: (!) 22 (01/07/19 1715)  BP: 108/62 (01/07/19 1715)  SpO2: 96 % (01/07/19 1715) Vital Signs (24h Range):  Temp:  [98.8 °F (37.1 °C)-102.4 °F (39.1 °C)] 99.9 °F (37.7 °C)  Pulse:  [60-94] 75  Resp:  [18-52] 22  SpO2:  [90 %-100 %] 96 %  BP: ()/(52-90) 108/62  Arterial Line BP: ()/() 130/65     Weight: (!) 141.4 kg (311 lb 11.7 oz)  Body mass index is 36.97 kg/m².    Intake/Output Summary (Last 24 hours) at 1/7/2019 1748  Last data filed at 1/7/2019 1711  Gross per 24 hour   Intake 2562.52 ml   Output 3800 ml   Net -1237.48 ml      Physical Exam   Constitutional: He appears well-developed and well-nourished.   HENT:   Right Ear: External ear normal.   Left Ear: External ear normal.   Nose: Nose normal.   ET/OG tubes in place   Eyes: Conjunctivae are normal. Right eye exhibits no discharge.   Neck: JVD (decreasing) present.   Cardiovascular: Normal rate and normal heart sounds.   No murmur heard.  Pulmonary/Chest: He has no wheezes. He has rales.   Mechanically ventilated. Coarse particularly over right base, less so than previously   Abdominal: Soft. Bowel sounds are normal. He exhibits no distension. There is no tenderness.   Musculoskeletal: He exhibits edema (trace BLE ). He exhibits no deformity.   Neurological:   Alert, follows commands and nod appropriately   Skin: Skin is warm and dry.       Significant Labs: All pertinent labs within the past 24 hours have been reviewed.    Significant Imaging: I have reviewed and interpreted all pertinent imaging results/findings within the past 24 hours.

## 2019-01-07 NOTE — SUBJECTIVE & OBJECTIVE
HPI:  Mr. Bhakta is a 64 yo male with HFrEF (EF 20%) and HTN who presented to the ED on 1/5/19 complaining of gradually worsening SOB with associated bilateral lower extremity swelling x1 week and productive cough. He is not on supplementary O2 at home. He reported compliance with his ;asix 80mg x2/day. He denied fever, chills, diaphoresis, nausea, emesis, diarrhea, abdominal pain, chest pain. Patient was hypoxic upon arrival to 80%. He was started on BiPAP and diuresis as his CXR noted significant pulmonary edema.     Hospital Course:  Mr. Bhakta was admitted for respiratory failure thought to be due to CHF exacerbation. He was started on BiPAP and diuresis. He had no leukocytosis or fever at the time of presentation. He had worsening respiratory failure the night of 1/5 and required intubation. Upon intubation he was found to have a copious amount of thick, yellow sputum, almost appearing purulent. He had been started on broad spectrum antibiotics with cefepime and vancomycin. Resp culture growing gram positive cocci in pairs and chains and GNR's. Shock also developed on the night of 1/5, suspected to be cardiogenic vs septic, so a central line was placed and levophed started. He developed a fever on 1/6 (102 F).     Interval History/Significant Events: failed SBT this AM per RN and RT with tachypnea, accessory muscle use. Afebrile overnight. Remains on low dose levophed.     Objective:     Vital Signs (Most Recent):  Temp: 99.9 °F (37.7 °C) (01/07/19 0715)  Pulse: 78 (01/07/19 1015)  Resp: (!) 25 (01/07/19 1015)  BP: (!) 108/55 (01/07/19 1015)  SpO2: 95 % (01/07/19 1015) Vital Signs (24h Range):  Temp:  [98.8 °F (37.1 °C)-102.4 °F (39.1 °C)] 99.9 °F (37.7 °C)  Pulse:  [58-94] 78  Resp:  [20-52] 25  SpO2:  [94 %-100 %] 95 %  BP: ()/(53-90) 108/55  Arterial Line BP: ()/(43-85) 96/51     Weight: (!) 141.4 kg (311 lb 11.7 oz)  Body mass index is 36.97 kg/m².      Intake/Output Summary (Last 24 hours)  at 1/7/2019 1131  Last data filed at 1/7/2019 1000  Gross per 24 hour   Intake 2728.96 ml   Output 3020 ml   Net -291.04 ml       Physical Exam   Constitutional: He appears well-developed. He has a sickly appearance. He appears ill. No distress. He is intubated.   HENT:   Head: Normocephalic and atraumatic.   Eyes: Conjunctivae are normal. Pupils are equal, round, and reactive to light. Right eye exhibits no discharge. Left eye exhibits no discharge. No scleral icterus.   Neck: Normal range of motion. Neck supple. JVD: unable to assess; TLC in place. No tracheal deviation present. No thyromegaly present.   Cardiovascular: Normal rate, regular rhythm, S1 normal and S2 normal.   Pulses:       Radial pulses are 2+ on the right side, and 2+ on the left side.        Dorsalis pedis pulses are 1+ on the right side, and 1+ on the left side.   LE's cold distal to ankles bilaterally. 1+ DP pulses palpated bilaterally    Pulmonary/Chest: No accessory muscle usage. He is intubated. No respiratory distress. He has no decreased breath sounds. He has no wheezes. He has rales in the right upper field, the right middle field, the left upper field and the left middle field.   Abdominal: Soft. Bowel sounds are normal. He exhibits no distension. There is no tenderness. There is no guarding.   Lymphadenopathy:     He has no cervical adenopathy.   Neurological: GCS eye subscore is 3. GCS verbal subscore is 1. GCS motor subscore is 6.   RASS -1 on light sedation with fentanyl. Will open eyes, follow commands when prompted.    Skin: Skin is dry. He is not diaphoretic. There is pallor.       Vents:  Vent Mode: PRVC (01/07/19 0906)  Ventilator Initiated: Yes (01/05/19 1945)  Set Rate: 16 bmp (01/07/19 1014)  Vt Set: 530 mL (01/07/19 1014)  Pressure Support: 10 cmH20 (01/07/19 0906)  PEEP/CPAP: 8 cmH20 (01/07/19 1014)  Oxygen Concentration (%): 30 (01/07/19 1015)  Peak Airway Pressure: 27.5 cmH2O (01/07/19 1014)  Total Ve: 12 mL (01/07/19  1014)  F/VT Ratio<105 (RSBI): (!) 35.97 (01/07/19 1014)    Lines/Drains/Airways     Central Venous Catheter Line                 Tunneled Central Line Insertion/Assessment - Triple Lumen  01/05/19 2130 right internal jugular 1 day          Drain                 NG/OG Tube 01/05/19 2200 orogastric 12 Fr. Left mouth 1 day         Urethral Catheter 01/05/19 1745 1 day          Airway                 Airway - Non-Surgical 01/05/19 1945 Endotracheal Tube 1 day          Arterial Line                 Arterial Line 01/05/19 2030 Right Radial 1 day          Peripheral Intravenous Line                 Peripheral IV - Single Lumen 01/05/19 0837 Right Antecubital 2 days         Peripheral IV - Single Lumen 01/05/19 1700 Anterior;Left Wrist 1 day                Significant Labs:    CBC/Anemia Profile:  Recent Labs   Lab 01/06/19  0441 01/07/19  0421   WBC 5.03 8.04   HGB 11.8* 11.8*   HCT 35.3* 34.8*    204   MCV 82 80*   RDW 14.8* 14.6*        Chemistries:  Recent Labs   Lab 01/06/19  0441 01/06/19  2318 01/07/19  0421    137 138   K 3.0* 3.0* 3.4*    100 100   CO2 29 28 29   BUN 17 13 12   CREATININE 1.1 0.9 0.9   CALCIUM 8.7 8.4* 8.6*   MG  --  2.0 1.9   PHOS  --  1.4*  --        Significant Imaging:  I have reviewed all pertinent imaging results/findings within the past 24 hours.

## 2019-01-07 NOTE — ASSESSMENT & PLAN NOTE
--2/2 cardiogenic pulmonary edema and concurrent pneumonia  --would continue Vanco, Cefepime given GPC's and GNR's in sputum culture. Awaiting speciation  --continue diuresis with 100 mg lasix BID for now to ensure patient is not net positive over next 24 hours and can aim for net negative 1 L if vasopressor requirements do not increase significantly with diuresis  --failed SBT this AM with tachypnea, accessory muscle use. Will plan to repeat in AM

## 2019-01-07 NOTE — PROGRESS NOTES
Pt was received on vent settings PRVC/16/530/+8/35%. SBT done pt did not luis RR increased 40's. Put pt back on PRVC and sedation was restated. VBG was done and reported.

## 2019-01-07 NOTE — PROGRESS NOTES
[facility]  Pulmonology  Progress Note    Patient Name: Papito Bhakta  MRN: [id number]  Admission Date: 1/5/2019  Hospital Length of Stay: 2 days  Code Status: Full Code  Attending Provider: Shira Damico MD  Primary Care Provider: Brynn To MD   Principal Problem: Acute respiratory failure with hypoxia    Subjective:     HPI:  Mr. Bhakta is a 62 yo male with HFrEF (EF 20%) and HTN who presented to the ED on 1/5/19 complaining of gradually worsening SOB with associated bilateral lower extremity swelling x1 week and productive cough. He is not on supplementary O2 at home. He reported compliance with his ;asix 80mg x2/day. He denied fever, chills, diaphoresis, nausea, emesis, diarrhea, abdominal pain, chest pain. Patient was hypoxic upon arrival to 80%. He was started on BiPAP and diuresis as his CXR noted significant pulmonary edema.     Hospital Course:  Mr. Bhakta was admitted for respiratory failure thought to be due to CHF exacerbation. He was started on BiPAP and diuresis. He had no leukocytosis or fever at the time of presentation. He had worsening respiratory failure the night of 1/5 and required intubation. Upon intubation he was found to have a copious amount of thick, yellow sputum, almost appearing purulent. He had been started on broad spectrum antibiotics with cefepime and vancomycin. Resp culture growing gram positive cocci in pairs and chains and GNR's. Shock also developed on the night of 1/5, suspected to be cardiogenic vs septic, so a central line was placed and levophed started. He developed a fever on 1/6 (102 F).     Interval History/Significant Events: failed SBT this AM per RN and RT with tachypnea, accessory muscle use. Afebrile overnight. Remains on low dose levophed.     Objective:     Vital Signs (Most Recent):  Temp: 99.9 °F (37.7 °C) (01/07/19 0715)  Pulse: 78 (01/07/19 1015)  Resp: (!) 25 (01/07/19 1015)  BP: (!) 108/55 (01/07/19 1015)  SpO2: 95 %  (01/07/19 1015) Vital Signs (24h Range):  Temp:  [98.8 °F (37.1 °C)-102.4 °F (39.1 °C)] 99.9 °F (37.7 °C)  Pulse:  [58-94] 78  Resp:  [20-52] 25  SpO2:  [94 %-100 %] 95 %  BP: ()/(53-90) 108/55  Arterial Line BP: ()/(43-85) 96/51     Weight: (!) 141.4 kg (311 lb 11.7 oz)  Body mass index is 36.97 kg/m².      Intake/Output Summary (Last 24 hours) at 1/7/2019 1131  Last data filed at 1/7/2019 1000  Gross per 24 hour   Intake 2728.96 ml   Output 3020 ml   Net -291.04 ml       Physical Exam   Constitutional: He appears well-developed. He has a sickly appearance. He appears ill. No distress. He is intubated.   HENT:   Head: Normocephalic and atraumatic.   Eyes: Conjunctivae are normal. Pupils are equal, round, and reactive to light. Right eye exhibits no discharge. Left eye exhibits no discharge. No scleral icterus.   Neck: Normal range of motion. Neck supple. JVD: unable to assess; TLC in place. No tracheal deviation present. No thyromegaly present.   Cardiovascular: Normal rate, regular rhythm, S1 normal and S2 normal.   Pulses:       Radial pulses are 2+ on the right side, and 2+ on the left side.        Dorsalis pedis pulses are 1+ on the right side, and 1+ on the left side.   LE's cold distal to ankles bilaterally. 1+ DP pulses palpated bilaterally    Pulmonary/Chest: No accessory muscle usage. He is intubated. No respiratory distress. He has no decreased breath sounds. He has no wheezes. He has rales in the right upper field, the right middle field, the left upper field and the left middle field.   Abdominal: Soft. Bowel sounds are normal. He exhibits no distension. There is no tenderness. There is no guarding.   Lymphadenopathy:     He has no cervical adenopathy.   Neurological: GCS eye subscore is 3. GCS verbal subscore is 1. GCS motor subscore is 6.   RASS -1 on light sedation with fentanyl. Will open eyes, follow commands when prompted.    Skin: Skin is dry. He is not diaphoretic. There is pallor.        Vents:  Vent Mode: PRVC (01/07/19 0906)  Ventilator Initiated: Yes (01/05/19 1945)  Set Rate: 16 bmp (01/07/19 1014)  Vt Set: 530 mL (01/07/19 1014)  Pressure Support: 10 cmH20 (01/07/19 0906)  PEEP/CPAP: 8 cmH20 (01/07/19 1014)  Oxygen Concentration (%): 30 (01/07/19 1015)  Peak Airway Pressure: 27.5 cmH2O (01/07/19 1014)  Total Ve: 12 mL (01/07/19 1014)  F/VT Ratio<105 (RSBI): (!) 35.97 (01/07/19 1014)    Lines/Drains/Airways     Central Venous Catheter Line                 Tunneled Central Line Insertion/Assessment - Triple Lumen  01/05/19 2130 right internal jugular 1 day          Drain                 NG/OG Tube 01/05/19 2200 orogastric 12 Fr. Left mouth 1 day         Urethral Catheter 01/05/19 1745 1 day          Airway                 Airway - Non-Surgical 01/05/19 1945 Endotracheal Tube 1 day          Arterial Line                 Arterial Line 01/05/19 2030 Right Radial 1 day          Peripheral Intravenous Line                 Peripheral IV - Single Lumen 01/05/19 0837 Right Antecubital 2 days         Peripheral IV - Single Lumen 01/05/19 1700 Anterior;Left Wrist 1 day                Significant Labs:    CBC/Anemia Profile:  Recent Labs   Lab 01/06/19  0441 01/07/19  0421   WBC 5.03 8.04   HGB 11.8* 11.8*   HCT 35.3* 34.8*    204   MCV 82 80*   RDW 14.8* 14.6*        Chemistries:  Recent Labs   Lab 01/06/19  0441 01/06/19  2318 01/07/19  0421    137 138   K 3.0* 3.0* 3.4*    100 100   CO2 29 28 29   BUN 17 13 12   CREATININE 1.1 0.9 0.9   CALCIUM 8.7 8.4* 8.6*   MG  --  2.0 1.9   PHOS  --  1.4*  --        Significant Imaging:  I have reviewed all pertinent imaging results/findings within the past 24 hours.    Assessment/Plan:     * Acute respiratory failure with hypoxia    --2/2 cardiogenic pulmonary edema and concurrent pneumonia  --would continue Vanco, Cefepime given GPC's and GNR's in sputum culture. Awaiting speciation  --continue diuresis with 100 mg lasix BID for now to  ensure patient is not net positive over next 24 hours and can aim for net negative 1 L if vasopressor requirements do not increase significantly with diuresis  --failed SBT this AM with tachypnea, accessory muscle use. Will plan to repeat in AM      Shock    --presentation consistent with acute on chronic HF exacerbation/cardiogenic shock with pulmonary edema, hypoxemia, LE edema. However, given fever and pneumonia, septic shock could also be playing a role  --would continue levophed, titrate to MAP 65   --continue Abx as above for pneumonia  --continue diuresis as tolerated. Continue holding BB, ANRI     Acute on chronic combined systolic and diastolic congestive heart failure    --would continue diuresis as above, as long as vasopressor requirements are not increasing significantly   --agree with holding BB in setting of shock. ANRI also on hold given shock   --continue levophed to target MAP of 65 mmHg     CAP (community acquired pneumonia)    --see above     Encephalopathy, metabolic    --2/2 sedation.   --continue low dose fentanyl gtt for pain/sedation. Titrate to RASS -1  --with SAT, patient awakens easily, follows commands and appears comfortable.  --would discontinue propofol         Above discussed with Dr. Vazquez.    Critical care time: 50 minutes         Carly Reynolds, BEE  Pulmonology  [facility] [place]

## 2019-01-07 NOTE — ASSESSMENT & PLAN NOTE
--would continue diuresis as above, as long as vasopressor requirements are not increasing significantly   --agree with holding BB in setting of shock. ANRI also on hold given shock   --continue levophed to target MAP of 65 mmHg

## 2019-01-07 NOTE — EICU
Notified of K 3 earlier today, given furosemide 100 mg and K riders 50 meqs    Ordered BMP Mg and Phos    · K 3.0 - Ordered potassium tablets 40 meqs x 2 doses  · Phos 1.4 - Ordered K phos 15 mmol IV

## 2019-01-07 NOTE — ASSESSMENT & PLAN NOTE
EF of 20% and grade 3 diastolic CHF on echo on 11/2018. Diurese with IV Lasix. Hold BB/Entresto while hypotensive.

## 2019-01-07 NOTE — ASSESSMENT & PLAN NOTE
Agitated and required sedation as he was reaching for ET tube. 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 Weaned sedation with improvement in mental status on 1/7/2019.

## 2019-01-07 NOTE — PROGRESS NOTES
Results for COURT BUSTOS (MRN [id number]) as of 1/7/2019 10:36   Ref. Range 1/7/2019 10:14   POC PH Latest Ref Range: 7.35 - 7.45  7.390   POC PCO2 Latest Ref Range: 35 - 45 mmHg 46.9 (H)   POC PO2 Latest Ref Range: 40 - 60 mmHg 38 (LL)   POC BE Latest Ref Range: -2 to 2 mmol/L 3   POC HCO3 Latest Ref Range: 24 - 28 mmol/L 28.4 (H)   POC SATURATED O2 Latest Ref Range: 95 - 100 % 70 (L)   POC TCO2 Latest Ref Range: 24 - 29 mmol/L 30 (H)   FiO2 Unknown 30   Vt Unknown 530   PEEP Unknown 8   Sample Unknown VENOUS   DelSys Unknown Adult Vent   Allens Test Unknown N/A   Site Unknown Other   Mode Unknown AC/PRVC   Rate Unknown 16   Sp02 Unknown 95   VBG was done and reported

## 2019-01-07 NOTE — ASSESSMENT & PLAN NOTE
--2/2 sedation.   --continue low dose fentanyl gtt for pain/sedation. Titrate to RASS -1  --with SAT, patient awakens easily, follows commands and appears comfortable.  --would discontinue propofol

## 2019-01-08 LAB
ALLENS TEST: ABNORMAL
ANION GAP SERPL CALC-SCNC: 8 MMOL/L
ANION GAP SERPL CALC-SCNC: 9 MMOL/L
BASOPHILS # BLD AUTO: 0.12 K/UL
BASOPHILS NFR BLD: 1.4 %
BSA FOR ECHO PROCEDURE: 2.77 M2
BUN SERPL-MCNC: 12 MG/DL
BUN SERPL-MCNC: 14 MG/DL
CA-I BLDV-SCNC: 1.15 MMOL/L
CALCIUM SERPL-MCNC: 8.6 MG/DL
CALCIUM SERPL-MCNC: 8.7 MG/DL
CHLORIDE SERPL-SCNC: 98 MMOL/L
CHLORIDE SERPL-SCNC: 99 MMOL/L
CO2 SERPL-SCNC: 29 MMOL/L
CO2 SERPL-SCNC: 31 MMOL/L
CREAT SERPL-MCNC: 0.9 MG/DL
CREAT SERPL-MCNC: 1.1 MG/DL
DELSYS: ABNORMAL
DIFFERENTIAL METHOD: ABNORMAL
EOSINOPHIL # BLD AUTO: 0.2 K/UL
EOSINOPHIL NFR BLD: 1.8 %
ERYTHROCYTE [DISTWIDTH] IN BLOOD BY AUTOMATED COUNT: 14.9 %
ERYTHROCYTE [SEDIMENTATION RATE] IN BLOOD BY WESTERGREN METHOD: 16 MM/H
EST. GFR  (AFRICAN AMERICAN): >60 ML/MIN/1.73 M^2
EST. GFR  (AFRICAN AMERICAN): >60 ML/MIN/1.73 M^2
EST. GFR  (NON AFRICAN AMERICAN): >60 ML/MIN/1.73 M^2
EST. GFR  (NON AFRICAN AMERICAN): >60 ML/MIN/1.73 M^2
FIO2: 0.3
GLUCOSE SERPL-MCNC: 107 MG/DL
GLUCOSE SERPL-MCNC: 96 MG/DL
HCO3 UR-SCNC: 27.8 MMOL/L (ref 24–28)
HCT VFR BLD AUTO: 32.6 %
HGB BLD-MCNC: 11.4 G/DL
LYMPHOCYTES # BLD AUTO: 1.5 K/UL
LYMPHOCYTES NFR BLD: 17.3 %
MAGNESIUM SERPL-MCNC: 1.8 MG/DL
MAGNESIUM SERPL-MCNC: 2.1 MG/DL
MCH RBC QN AUTO: 28.4 PG
MCHC RBC AUTO-ENTMCNC: 35 G/DL
MCV RBC AUTO: 81 FL
MIN VOL: 10.5
MODE: ABNORMAL
MONOCYTES # BLD AUTO: 0.8 K/UL
MONOCYTES NFR BLD: 9.9 %
NEUTROPHILS # BLD AUTO: 5.8 K/UL
NEUTROPHILS NFR BLD: 69.8 %
PCO2 BLDA: 42.2 MMHG (ref 35–45)
PEEP: 8
PH SMN: 7.43 [PH] (ref 7.35–7.45)
PLATELET # BLD AUTO: 210 K/UL
PMV BLD AUTO: 12 FL
PO2 BLDA: 63 MMHG (ref 80–100)
POC BE: 3 MMOL/L
POC SATURATED O2: 92 % (ref 95–100)
POC TCO2: 29 MMOL/L (ref 23–27)
POTASSIUM SERPL-SCNC: 3.4 MMOL/L
POTASSIUM SERPL-SCNC: 4.1 MMOL/L
RBC # BLD AUTO: 4.01 M/UL
SAMPLE: ABNORMAL
SITE: ABNORMAL
SODIUM SERPL-SCNC: 137 MMOL/L
SODIUM SERPL-SCNC: 137 MMOL/L
SP02: 96
VT: 540
WBC # BLD AUTO: 8.37 K/UL

## 2019-01-08 PROCEDURE — 83735 ASSAY OF MAGNESIUM: CPT | Mod: HCNC

## 2019-01-08 PROCEDURE — 83735 ASSAY OF MAGNESIUM: CPT | Mod: 91,HCNC

## 2019-01-08 PROCEDURE — 99292 PR CRITICAL CARE, ADDL 30 MIN: ICD-10-PCS | Mod: HCNC,,, | Performed by: EMERGENCY MEDICINE

## 2019-01-08 PROCEDURE — 99292 CRITICAL CARE ADDL 30 MIN: CPT | Mod: HCNC,,, | Performed by: EMERGENCY MEDICINE

## 2019-01-08 PROCEDURE — 25000003 PHARM REV CODE 250: Mod: HCNC | Performed by: NURSE PRACTITIONER

## 2019-01-08 PROCEDURE — 94003 VENT MGMT INPAT SUBQ DAY: CPT | Mod: HCNC

## 2019-01-08 PROCEDURE — 93005 ELECTROCARDIOGRAM TRACING: CPT | Mod: HCNC

## 2019-01-08 PROCEDURE — 93010 EKG 12-LEAD: ICD-10-PCS | Mod: HCNC,,, | Performed by: INTERNAL MEDICINE

## 2019-01-08 PROCEDURE — 94761 N-INVAS EAR/PLS OXIMETRY MLT: CPT | Mod: HCNC

## 2019-01-08 PROCEDURE — 93010 ELECTROCARDIOGRAM REPORT: CPT | Mod: HCNC,,, | Performed by: INTERNAL MEDICINE

## 2019-01-08 PROCEDURE — 20000000 HC ICU ROOM: Mod: HCNC

## 2019-01-08 PROCEDURE — 99900026 HC AIRWAY MAINTENANCE (STAT): Mod: HCNC

## 2019-01-08 PROCEDURE — 99900035 HC TECH TIME PER 15 MIN (STAT): Mod: HCNC

## 2019-01-08 PROCEDURE — 63600175 PHARM REV CODE 636 W HCPCS: Mod: HCNC | Performed by: HOSPITALIST

## 2019-01-08 PROCEDURE — 82803 BLOOD GASES ANY COMBINATION: CPT | Mod: HCNC

## 2019-01-08 PROCEDURE — 63600175 PHARM REV CODE 636 W HCPCS: Mod: HCNC | Performed by: EMERGENCY MEDICINE

## 2019-01-08 PROCEDURE — 25000003 PHARM REV CODE 250: Mod: HCNC | Performed by: EMERGENCY MEDICINE

## 2019-01-08 PROCEDURE — 99291 CRITICAL CARE FIRST HOUR: CPT | Mod: HCNC,,, | Performed by: NURSE PRACTITIONER

## 2019-01-08 PROCEDURE — 85025 COMPLETE CBC W/AUTO DIFF WBC: CPT | Mod: HCNC

## 2019-01-08 PROCEDURE — 36600 WITHDRAWAL OF ARTERIAL BLOOD: CPT | Mod: HCNC

## 2019-01-08 PROCEDURE — 63600175 PHARM REV CODE 636 W HCPCS: Mod: HCNC | Performed by: NURSE PRACTITIONER

## 2019-01-08 PROCEDURE — 99291 PR CRITICAL CARE, E/M 30-74 MINUTES: ICD-10-PCS | Mod: HCNC,25,, | Performed by: INTERNAL MEDICINE

## 2019-01-08 PROCEDURE — 25000003 PHARM REV CODE 250: Mod: HCNC | Performed by: INTERNAL MEDICINE

## 2019-01-08 PROCEDURE — 63600175 PHARM REV CODE 636 W HCPCS: Mod: HCNC | Performed by: INTERNAL MEDICINE

## 2019-01-08 PROCEDURE — 36415 COLL VENOUS BLD VENIPUNCTURE: CPT | Mod: HCNC

## 2019-01-08 PROCEDURE — 99291 PR CRITICAL CARE, E/M 30-74 MINUTES: ICD-10-PCS | Mod: HCNC,,, | Performed by: NURSE PRACTITIONER

## 2019-01-08 PROCEDURE — 99291 CRITICAL CARE FIRST HOUR: CPT | Mod: HCNC,25,, | Performed by: INTERNAL MEDICINE

## 2019-01-08 PROCEDURE — 80048 BASIC METABOLIC PNL TOTAL CA: CPT | Mod: HCNC

## 2019-01-08 PROCEDURE — 25000003 PHARM REV CODE 250: Mod: HCNC | Performed by: HOSPITALIST

## 2019-01-08 PROCEDURE — 27000221 HC OXYGEN, UP TO 24 HOURS: Mod: HCNC

## 2019-01-08 PROCEDURE — 80048 BASIC METABOLIC PNL TOTAL CA: CPT | Mod: 91,HCNC

## 2019-01-08 RX ORDER — DOBUTAMINE HYDROCHLORIDE 400 MG/100ML
2 INJECTION, SOLUTION INTRAVENOUS CONTINUOUS
Status: DISCONTINUED | OUTPATIENT
Start: 2019-01-08 | End: 2019-01-11

## 2019-01-08 RX ORDER — FUROSEMIDE 10 MG/ML
100 INJECTION INTRAMUSCULAR; INTRAVENOUS 3 TIMES DAILY
Status: DISCONTINUED | OUTPATIENT
Start: 2019-01-08 | End: 2019-01-09

## 2019-01-08 RX ORDER — POTASSIUM CHLORIDE 7.45 MG/ML
10 INJECTION INTRAVENOUS
Status: COMPLETED | OUTPATIENT
Start: 2019-01-08 | End: 2019-01-08

## 2019-01-08 RX ORDER — MAGNESIUM SULFATE 1 G/100ML
1 INJECTION INTRAVENOUS ONCE
Status: COMPLETED | OUTPATIENT
Start: 2019-01-08 | End: 2019-01-08

## 2019-01-08 RX ADMIN — DOBUTAMINE IN DEXTROSE 2 MCG/KG/MIN: 200 INJECTION, SOLUTION INTRAVENOUS at 01:01

## 2019-01-08 RX ADMIN — CHLORHEXIDINE GLUCONATE 0.12% ORAL RINSE 15 ML: 1.2 LIQUID ORAL at 08:01

## 2019-01-08 RX ADMIN — ENOXAPARIN SODIUM 40 MG: 100 INJECTION SUBCUTANEOUS at 04:01

## 2019-01-08 RX ADMIN — POTASSIUM CHLORIDE 10 MEQ: 7.46 INJECTION, SOLUTION INTRAVENOUS at 09:01

## 2019-01-08 RX ADMIN — CEFEPIME HYDROCHLORIDE 1 G: 1 INJECTION, SOLUTION INTRAVENOUS at 06:01

## 2019-01-08 RX ADMIN — CHLORHEXIDINE GLUCONATE 0.12% ORAL RINSE 15 ML: 1.2 LIQUID ORAL at 09:01

## 2019-01-08 RX ADMIN — PANTOPRAZOLE SODIUM 40 MG: 40 GRANULE, DELAYED RELEASE ORAL at 08:01

## 2019-01-08 RX ADMIN — VANCOMYCIN HYDROCHLORIDE 1500 MG: 1 INJECTION, POWDER, LYOPHILIZED, FOR SOLUTION INTRAVENOUS at 04:01

## 2019-01-08 RX ADMIN — CEFEPIME HYDROCHLORIDE 1 G: 1 INJECTION, SOLUTION INTRAVENOUS at 11:01

## 2019-01-08 RX ADMIN — FUROSEMIDE 100 MG: 10 INJECTION, SOLUTION INTRAMUSCULAR; INTRAVENOUS at 09:01

## 2019-01-08 RX ADMIN — VANCOMYCIN HYDROCHLORIDE 1500 MG: 1 INJECTION, POWDER, LYOPHILIZED, FOR SOLUTION INTRAVENOUS at 08:01

## 2019-01-08 RX ADMIN — FUROSEMIDE 100 MG: 10 INJECTION, SOLUTION INTRAMUSCULAR; INTRAVENOUS at 02:01

## 2019-01-08 RX ADMIN — Medication 100 MCG/HR: at 05:01

## 2019-01-08 RX ADMIN — FUROSEMIDE 100 MG: 10 INJECTION, SOLUTION INTRAMUSCULAR; INTRAVENOUS at 08:01

## 2019-01-08 RX ADMIN — ASPIRIN 325 MG ORAL TABLET 325 MG: 325 PILL ORAL at 08:01

## 2019-01-08 RX ADMIN — PRAVASTATIN SODIUM 80 MG: 40 TABLET ORAL at 08:01

## 2019-01-08 RX ADMIN — VANCOMYCIN HYDROCHLORIDE 1500 MG: 1 INJECTION, POWDER, LYOPHILIZED, FOR SOLUTION INTRAVENOUS at 11:01

## 2019-01-08 RX ADMIN — CEFEPIME HYDROCHLORIDE 1 G: 1 INJECTION, SOLUTION INTRAVENOUS at 02:01

## 2019-01-08 RX ADMIN — POTASSIUM CHLORIDE 10 MEQ: 7.46 INJECTION, SOLUTION INTRAVENOUS at 07:01

## 2019-01-08 RX ADMIN — MAGNESIUM SULFATE 1 G: 1 INJECTION INTRAVENOUS at 07:01

## 2019-01-08 NOTE — CONSULTS
"[facility]  Cardiology  Consult Note    Patient Name: Papito Bhakta  MRN: [id number]  Admission Date: 1/5/2019  Hospital Length of Stay: 3 days  Code Status: Full Code   Attending Provider: Jayden Gray MD   Consulting Provider: Shailesh Eng MD  Primary Care Physician: Brynn To MD  Principal Problem:Acute respiratory failure with hypoxia    Patient information was obtained from past medical records and ER records.     Inpatient consult to Cardiology  Consult performed by: Shailesh Eng MD  Consult ordered by: Carly Reynolds NP  Reason for consult: CHF        Subjective:     Chief Complaint:  Shortness of breath     HPI:   63 y.o M with CHF, Hyperlipidemia, and Hypertension presents to the ED c/o gradually worsening and severe (10/10) SOB with associated bilateral lower extremity swelling x1 week. He also reports a hematemesis and productive cough with sputum described as "like grits." He is not on supplementary O2 at home. He is compliant with his Lasix 80mg x2/day. He denies fever, chills, diaphoresis, nausea, emesis, hematemesis, diarrhea, abdominal pain, chest pain, back pain, difficulty urinating and rash. No prior tx.     Known to me from clinic and previously had refused biventricular ICD.  Currently intubated and unable to provide any adequate history.  Initially admitted for volume overload and on hospital day 2.  Developed significant respiratory distress felt initially to be secondary to aspiration requiring intubation.  He has had hypotension possibly secondary to sepsis versus cardiogenic.  He has been weaned from pressor and is currently stable but intubated.  Spoke with critical care nurse practitioner at bedside.  Currently having difficulty weaning ventilator despite decent diuresis.  Bedside echo performed shows severe biventricular dysfunction decreased from previous echo.  Initial recommendations for inotrope therapy.    Past Medical History:   Diagnosis " Date    CHF (congestive heart failure)     Hyperlipidemia     Hypertension        Past Surgical History:   Procedure Laterality Date    Heart Cath-Bilateral Bilateral 2018    Performed by Shailesh Eng [facility] at [facility] C[facility] LAB    TESTICLE SURGERY         Review of patient's allergies indicates:  No Known Allergies    No current facility-administered medications on file prior to encounter.      Current Outpatient Medications on File Prior to Encounter   Medication Sig    aspirin 325 MG tablet Take 325 mg by mouth 2 (two) times daily.     carvedilol (COREG) 6.25 MG tablet Take 1 tablet (6.25 mg total) by mouth 2 (two) times daily with meals.    furosemide (LASIX) 80 MG tablet Take 1 tablet (80 mg total) by mouth 2 (two) times daily.    pravastatin (PRAVACHOL) 80 MG tablet Take 1 tablet (80 mg total) by mouth once daily.    sacubitril-valsartan (ENTRESTO) 49-51 mg per tablet Take 1 tablet by mouth 2 (two) times daily.    spironolactone (ALDACTONE) 25 MG tablet Take 1 tablet (25 mg total) by mouth once daily.     Family History     Problem Relation (Age of Onset)    Epilepsy Mother        Tobacco Use    Smoking status: Former Smoker     Packs/day: 0.50     Years: 40.00     Pack years: 20.00     Types: Cigarettes, Cigars     Last attempt to quit: 2014     Years since quittin.0    Smokeless tobacco: Never Used   Substance and Sexual Activity    Alcohol use: Yes     Comment: once a month    Drug use: No    Sexual activity: Yes     Birth control/protection: None     Comment: uses protection sometimes     Review of Systems   Unable to perform ROS: intubated     Objective:     Vital Signs (Most Recent):  Temp: 98.9 °F (37.2 °C) (19 1100)  Pulse: 66 (19 1230)  Resp: 18 (19 1230)  BP: (!) 100/54 (19 1115)  SpO2: (!) 94 % (19 1230) Vital Signs (24h Range):  Temp:  [98.7 °F (37.1 °C)-100.3 °F (37.9 °C)] 98.9 °F (37.2 °C)  Pulse:  [65-96] 66  Resp:  [7-49] 18  SpO2:  [87 %-99 %]  94 %  BP: ()/(50-67) 100/54  Arterial Line BP: ()/() 135/68     Weight: (!) 141.1 kg (311 lb)  Body mass index is 36.88 kg/m².    SpO2: (!) 94 %  O2 Device (Oxygen Therapy): ventilator      Intake/Output Summary (Last 24 hours) at 1/8/2019 1331  Last data filed at 1/8/2019 1200  Gross per 24 hour   Intake 1507.03 ml   Output 2630 ml   Net -1122.97 ml       Lines/Drains/Airways     Central Venous Catheter Line                 Tunneled Central Line Insertion/Assessment - Triple Lumen  01/05/19 2130 right internal jugular 2 days          Drain                 NG/OG Tube 01/05/19 2200 orogastric 12 Fr. Left mouth 2 days         Urethral Catheter 01/05/19 1745 2 days          Airway                 Airway - Non-Surgical 01/05/19 1945 Endotracheal Tube 2 days                Physical Exam   Constitutional: He appears well-developed and well-nourished. No distress.   HENT:   Head: Normocephalic and atraumatic.   Eyes: Conjunctivae and EOM are normal. Pupils are equal, round, and reactive to light.   Neck: Normal range of motion. Neck supple. No thyromegaly present.   Cardiovascular: Normal rate, regular rhythm and normal heart sounds.   No murmur heard.  Pulmonary/Chest: Effort normal and breath sounds normal. No respiratory distress. He has no wheezes. He has no rales. He exhibits no tenderness.   Abdominal: Soft. Bowel sounds are normal.   Musculoskeletal: He exhibits no edema.   Neurological:   Intubated, sedated   Skin: Skin is warm and dry.   Psychiatric: He has a normal mood and affect. His behavior is normal.       Significant Labs:   ABG:   Recent Labs   Lab 01/07/19  0515 01/07/19  1014 01/08/19  0534   PH 7.504* 7.390 7.426   PCO2 33.0* 46.9* 42.2   HCO3 26.0 28.4* 27.8   POCSATURATED 97 70* 92*   BE 3 3 3   , Blood Culture: No results for input(s): LABBLOO in the last 48 hours., CMP   Recent Labs   Lab 01/07/19  2135 01/08/19  0334 01/08/19  1144    137 137   K 3.6 3.4* 4.1    98 99    CO2 28 31* 29   * 107 96   BUN 12 12 14   CREATININE 1.0 0.9 1.1   CALCIUM 8.5* 8.7 8.6*   ANIONGAP 10 8 9   ESTGFRAFRICA >60 >60 >60   EGFRNONAA >60 >60 >60   , CBC   Recent Labs   Lab 01/07/19  0421 01/08/19  0334   WBC 8.04 8.37   HGB 11.8* 11.4*   HCT 34.8* 32.6*    210   , INR No results for input(s): INR, PROTIME in the last 48 hours., Lipid Panel No results for input(s): CHOL, HDL, LDLCALC, TRIG, CHOLHDL in the last 48 hours. and Troponin No results for input(s): TROPONINI in the last 48 hours.    Significant Imaging: Echocardiogram:   2D echo with color flow doppler:   Results for orders placed or performed during the hospital encounter of 01/24/18   2D echo with color flow doppler   Result Value Ref Range    QEF 15 (A) 55 - 65    Mitral Valve Regurgitation MILD     Diastolic Dysfunction Yes (A)     Est. PA Systolic Pressure 26.81     Pericardial Effusion TRIVIAL      Echo today shows even further biventricular dysfunction    Bilateral heart catheterization:  2-18  B. Summary/Post-Operative Diagnosis       Normal coronary arteries.    Systolic dysfunction.    Low right and left Filling Pressures.    Mild Pulmonary Hypertension.    Assessment and Plan:     * Acute respiratory failure with hypoxia    Ventilator management per Pulmonary     Shock    Weaned off pressors  Antibiotics per primary     Acute on chronic combined systolic and diastolic congestive heart failure    Severe biventricular dysfunction  Known nonischemic etiology  Previously had refused biventricular ICD  Will benefit from low-dose inotrope     Essential hypertension    All meds held with hypotension     Hyperlipidemia    Statin         VTE Risk Mitigation (From admission, onward)        Ordered     enoxaparin injection 40 mg  Daily      01/05/19 1036     IP VTE HIGH RISK PATIENT  Once      01/05/19 1037        * total ICU time spent on case 35 min    Thank you for your consult. I will follow-up with patient. Please contact  us if you have any additional questions.    Shailesh Eng MD  Cardiology   [facility]

## 2019-01-08 NOTE — PROGRESS NOTES
[facility] [place]  Pulmonology  Progress Note    Patient Name: Papito Bhakta  MRN: [id number]  Admission Date: 1/5/2019  Hospital Length of Stay: 3 days  Code Status: Full Code  Attending Provider: Jayden Gray MD  Primary Care Provider: Brynn To MD   Principal Problem: Acute respiratory failure with hypoxia    Subjective:     HPI:  Mr. Bhakta is a 62 yo male with HFrEF (EF 20%) and HTN who presented to the ED on 1/5/19 complaining of gradually worsening SOB with associated bilateral lower extremity swelling x1 week and productive cough. He is not on supplementary O2 at home. He reported compliance with his ;asix 80mg x2/day. He denied fever, chills, diaphoresis, nausea, emesis, diarrhea, abdominal pain, chest pain. Patient was hypoxic upon arrival to 80%. He was started on BiPAP and diuresis as his CXR noted significant pulmonary edema.     Hospital Course:  Mr. Bhakta was admitted for respiratory failure thought to be due to CHF exacerbation. He was started on BiPAP and diuresis. He had no leukocytosis or fever at the time of presentation. He had worsening respiratory failure the night of 1/5 and required intubation. Upon intubation he was found to have a copious amount of thick, yellow sputum, almost appearing purulent. He had been started on broad spectrum antibiotics with cefepime and vancomycin. Respiratory culture growing gram positive cocci in pairs and chains and GNR's. Shock also developed on the night of 1/5, suspected to be cardiogenic vs septic, so a central line was placed and levophed started. He developed a fever on 1/6 (102 F), antibiotics continued. Shock resolved on 1/8 and he has failed SBT's due to tachypnea, high work of breathing despite diuresis since 1/7.     Interval History/Significant Events: failed SBT this AM again with tachypnea, accessory muscle use. Weaned from vasopressors. Diuresing well, negative nearly 2 L over past day.     Objective:     Vital Signs  (Most Recent):  Temp: 99.1 °F (37.3 °C) (01/08/19 0400)  Pulse: 68 (01/08/19 1000)  Resp: 17 (01/08/19 1000)  BP: (!) 94/51 (01/08/19 1000)  SpO2: 95 % (01/08/19 1000) Vital Signs (24h Range):  Temp:  [98.7 °F (37.1 °C)-100.3 °F (37.9 °C)] 99.1 °F (37.3 °C)  Pulse:  [66-96] 68  Resp:  [7-49] 17  SpO2:  [87 %-99 %] 95 %  BP: ()/(50-67) 94/51  Arterial Line BP: ()/() 135/68     Weight: (!) 141.4 kg (311 lb 11.7 oz)  Body mass index is 36.97 kg/m².      Intake/Output Summary (Last 24 hours) at 1/8/2019 1053  Last data filed at 1/8/2019 0900  Gross per 24 hour   Intake 1509.55 ml   Output 2880 ml   Net -1370.45 ml       Physical Exam   Constitutional: He appears well-developed. He has a sickly appearance. He appears ill. No distress. He is intubated.   HENT:   Head: Normocephalic and atraumatic.   Eyes: Conjunctivae are normal. Pupils are equal, round, and reactive to light. Right eye exhibits no discharge. Left eye exhibits no discharge. No scleral icterus.   Neck: Normal range of motion. Neck supple. JVD: unable to assess; TLC in place. No tracheal deviation present. No thyromegaly present.   Cardiovascular: Normal rate, regular rhythm, S1 normal and S2 normal.   Pulses:       Radial pulses are 2+ on the right side, and 2+ on the left side.        Dorsalis pedis pulses are 1+ on the right side, and 1+ on the left side.   LE's warm today. 1+ DP pulses palpated bilaterally    Pulmonary/Chest: No accessory muscle usage. He is intubated. No respiratory distress. He has no decreased breath sounds. He has no wheezes. He has rales in the right upper field, the right middle field, the left upper field and the left middle field.   Abdominal: Soft. Bowel sounds are normal. He exhibits no distension. There is no tenderness. There is no guarding.   Lymphadenopathy:     He has no cervical adenopathy.   Neurological: GCS eye subscore is 3. GCS verbal subscore is 1. GCS motor subscore is 6.   RASS -1 on light  sedation with fentanyl. Will open eyes, follow commands when prompted.    Skin: Skin is dry. He is not diaphoretic. There is pallor.       Vents:  Vent Mode: PRVC (01/08/19 0830)  Ventilator Initiated: Yes (01/05/19 1945)  Set Rate: 16 bmp (01/08/19 0830)  Vt Set: 540 mL (01/08/19 0830)  Pressure Support: 10 cmH20 (01/07/19 0906)  PEEP/CPAP: 8 cmH20 (01/08/19 0830)  Oxygen Concentration (%): 30 (01/08/19 1000)  Peak Airway Pressure: 34.5 cmH2O (01/08/19 0830)  Total Ve: 9.6 mL (01/08/19 0830)  F/VT Ratio<105 (RSBI): (!) 82.63(Simultaneous filing. User may not have seen previous data.) (01/08/19 0830)    Lines/Drains/Airways     Central Venous Catheter Line                 Tunneled Central Line Insertion/Assessment - Triple Lumen  01/05/19 2130 right internal jugular 2 days          Drain                 NG/OG Tube 01/05/19 2200 orogastric 12 Fr. Left mouth 2 days         Urethral Catheter 01/05/19 1745 2 days          Airway                 Airway - Non-Surgical 01/05/19 1945 Endotracheal Tube 2 days                Significant Labs:    CBC/Anemia Profile:  Recent Labs   Lab 01/07/19  0421 01/08/19  0334   WBC 8.04 8.37   HGB 11.8* 11.4*   HCT 34.8* 32.6*    210   MCV 80* 81*   RDW 14.6* 14.9*        Chemistries:  Recent Labs   Lab 01/06/19  2318 01/07/19  0421 01/07/19  1329 01/07/19  2135 01/08/19  0334    138  --  138 137   K 3.0* 3.4* 3.8 3.6 3.4*    100  --  100 98   CO2 28 29  --  28 31*   BUN 13 12  --  12 12   CREATININE 0.9 0.9  --  1.0 0.9   CALCIUM 8.4* 8.6*  --  8.5* 8.7   MG 2.0 1.9  --  1.8 1.8   PHOS 1.4*  --   --   --   --        Significant Imaging:  I have reviewed all pertinent imaging results/findings within the past 24 hours.    Assessment/Plan:     * Acute respiratory failure with hypoxia    --2/2 cardiogenic pulmonary edema and concurrent pneumonia. Concerned that failure to wean from ventilator is more related to patient's severe HFrEF as new echo notes EF 10% and patient  develops respiratory distress when on SBT's.   --have consulted cardiology to assist in optimization of HF regimen. Will likely need an inotrope, appreciate cardiology's assistance.   --continue diuresis with 100 mg lasix TID   --would continue Vanco, Cefepime given GPC's and GNR's in sputum culture  --failed SBT this AM with tachypnea, accessory muscle use. Will plan to repeat in AM      Shock    --resolved. Weaned from levophed 1/7     Acute on chronic combined systolic and diastolic congestive heart failure    --appreciate cardiology assistance  --continue diuresis as above. Likely needs inotrope        Encephalopathy, metabolic    --2/2 sedation.   --continue low dose fentanyl gtt for pain/sedation. Titrate to RASS -1  --with SAT, patient awakens easily, follows commands and appears comfortable.         Critical care time: 50 minutes    Above discussed with Dr. Vazquez.        Carly Reynolds NP  Pulmonology  [facility]

## 2019-01-08 NOTE — ASSESSMENT & PLAN NOTE
Intubated for respiratory failure due to CHF exacerbation and CAP.   Pulm/CC consulted. No wheezes on exam. Broad spectrum abx.   Resp stain/Cx with resp yung but no bacterial growth on culture. BCx NGTD. Diurese with IV Lasix. SBT daily.  Appreciate Pulmonary input.  Doing better, but still requiring vent.  Hopefully extubate soon.

## 2019-01-08 NOTE — PROGRESS NOTES
Received patient on vent on charted settings. Alarms set and functional. Ambu bag and mask at [facility]. Pt. Resting without distress. Will continue to monitor.

## 2019-01-08 NOTE — SUBJECTIVE & OBJECTIVE
Past Medical History:   Diagnosis Date    CHF (congestive heart failure)     Hyperlipidemia     Hypertension        Past Surgical History:   Procedure Laterality Date    Heart Cath-Bilateral Bilateral 2018    Performed by Shailesh Eng MD at [facility] CATH LAB    TESTICLE SURGERY         Review of patient's allergies indicates:  No Known Allergies    No current facility-administered medications on file prior to encounter.      Current Outpatient Medications on File Prior to Encounter   Medication Sig    aspirin 325 MG tablet Take 325 mg by mouth 2 (two) times daily.     carvedilol (COREG) 6.25 MG tablet Take 1 tablet (6.25 mg total) by mouth 2 (two) times daily with meals.    furosemide (LASIX) 80 MG tablet Take 1 tablet (80 mg total) by mouth 2 (two) times daily.    pravastatin (PRAVACHOL) 80 MG tablet Take 1 tablet (80 mg total) by mouth once daily.    sacubitril-valsartan (ENTRESTO) 49-51 mg per tablet Take 1 tablet by mouth 2 (two) times daily.    spironolactone (ALDACTONE) 25 MG tablet Take 1 tablet (25 mg total) by mouth once daily.     Family History     Problem Relation (Age of Onset)    Epilepsy Mother        Tobacco Use    Smoking status: Former Smoker     Packs/day: 0.50     Years: 40.00     Pack years: 20.00     Types: Cigarettes, Cigars     Last attempt to quit: 2014     Years since quittin.0    Smokeless tobacco: Never Used   Substance and Sexual Activity    Alcohol use: Yes     Comment: once a month    Drug use: No    Sexual activity: Yes     Birth control/protection: None     Comment: uses protection sometimes     Review of Systems   Unable to perform ROS: intubated     Objective:     Vital Signs (Most Recent):  Temp: 98.9 °F (37.2 °C) (19 1100)  Pulse: 66 (19 1230)  Resp: 18 (19 1230)  BP: (!) 100/54 (19 1115)  SpO2: (!) 94 % (19 1230) Vital Signs (24h Range):  Temp:  [98.7 °F (37.1 °C)-100.3 °F (37.9 °C)] 98.9 °F (37.2 °C)  Pulse:  [65-96]  66  Resp:  [7-49] 18  SpO2:  [87 %-99 %] 94 %  BP: ()/(50-67) 100/54  Arterial Line BP: ()/() 135/68     Weight: (!) 141.1 kg (311 lb)  Body mass index is 36.88 kg/m².    SpO2: (!) 94 %  O2 Device (Oxygen Therapy): ventilator      Intake/Output Summary (Last 24 hours) at 1/8/2019 1331  Last data filed at 1/8/2019 1200  Gross per 24 hour   Intake 1507.03 ml   Output 2630 ml   Net -1122.97 ml       Lines/Drains/Airways     Central Venous Catheter Line                 Tunneled Central Line Insertion/Assessment - Triple Lumen  01/05/19 2130 right internal jugular 2 days          Drain                 NG/OG Tube 01/05/19 2200 orogastric 12 Fr. Left mouth 2 days         Urethral Catheter 01/05/19 1745 2 days          Airway                 Airway - Non-Surgical 01/05/19 1945 Endotracheal Tube 2 days                Physical Exam   Constitutional: He appears well-developed and well-nourished. No distress.   HENT:   Head: Normocephalic and atraumatic.   Eyes: Conjunctivae and EOM are normal. Pupils are equal, round, and reactive to light.   Neck: Normal range of motion. Neck supple. No thyromegaly present.   Cardiovascular: Normal rate, regular rhythm and normal heart sounds.   No murmur heard.  Pulmonary/Chest: Effort normal and breath sounds normal. No respiratory distress. He has no wheezes. He has no rales. He exhibits no tenderness.   Abdominal: Soft. Bowel sounds are normal.   Musculoskeletal: He exhibits no edema.   Neurological:   Intubated, sedated   Skin: Skin is warm and dry.   Psychiatric: He has a normal mood and affect. His behavior is normal.       Significant Labs:   ABG:   Recent Labs   Lab 01/07/19  0515 01/07/19  1014 01/08/19  0534   PH 7.504* 7.390 7.426   PCO2 33.0* 46.9* 42.2   HCO3 26.0 28.4* 27.8   POCSATURATED 97 70* 92*   BE 3 3 3   , Blood Culture: No results for input(s): LABBLOO in the last 48 hours., CMP   Recent Labs   Lab 01/07/19  2135 01/08/19  0334 01/08/19  1144     137 137   K 3.6 3.4* 4.1    98 99   CO2 28 31* 29   * 107 96   BUN 12 12 14   CREATININE 1.0 0.9 1.1   CALCIUM 8.5* 8.7 8.6*   ANIONGAP 10 8 9   ESTGFRAFRICA >60 >60 >60   EGFRNONAA >60 >60 >60   , CBC   Recent Labs   Lab 01/07/19  0421 01/08/19  0334   WBC 8.04 8.37   HGB 11.8* 11.4*   HCT 34.8* 32.6*    210   , INR No results for input(s): INR, PROTIME in the last 48 hours., Lipid Panel No results for input(s): CHOL, HDL, LDLCALC, TRIG, CHOLHDL in the last 48 hours. and Troponin No results for input(s): TROPONINI in the last 48 hours.    Significant Imaging: Echocardiogram:   2D echo with color flow doppler:   Results for orders placed or performed during the hospital encounter of 01/24/18   2D echo with color flow doppler   Result Value Ref Range    QEF 15 (A) 55 - 65    Mitral Valve Regurgitation MILD     Diastolic Dysfunction Yes (A)     Est. PA Systolic Pressure 26.81     Pericardial Effusion TRIVIAL      Echo today shows even further biventricular dysfunction    Bilateral heart catheterization:  2-18  B. Summary/Post-Operative Diagnosis       Normal coronary arteries.    Systolic dysfunction.    Low right and left Filling Pressures.    Mild Pulmonary Hypertension.

## 2019-01-08 NOTE — PROGRESS NOTES
Pt recieved intubated on Servo i ventilator with the following settings;. PRVC  , 16 RR, 30% FI02, +8 Peep Alarms are set and functioning, Ambu bag at bedside, Pt without distress RT will continue to monitor and wean as tolerated.     08:17 Pt placed on a spontaneous breathing trial; pt maintained increased RR the entire trial however he stated he felt fine.  after 15-17 minutes Pt required suctioning, was in distress and requested to be put back on previous settings.  Pt sedated and is resting on previous settings.  RT will continue to monitor

## 2019-01-08 NOTE — PROGRESS NOTES
Pt recieved intubated on Servo i ventilator with the following settings;. PRVC, 540 fTV, 16 RR, 30% FI02, +8  Peep. Alarms are set and functioning, Ambu bag at bedside, Pt without distress RT will continue to monitor and wean as tolerated.     11:39  Per order RT advanced to tube from 22cm to 25 cm

## 2019-01-08 NOTE — SUBJECTIVE & OBJECTIVE
Interval History: Episodes of Vtach overnight.  Remains intubated.    Review of Systems   Unable to perform ROS: Intubated     Objective:     Vital Signs (Most Recent):  Temp: 98.9 °F (37.2 °C) (01/08/19 1100)  Pulse: 66 (01/08/19 1230)  Resp: 18 (01/08/19 1230)  BP: (!) 100/54 (01/08/19 1115)  SpO2: (!) 94 % (01/08/19 1230) Vital Signs (24h Range):  Temp:  [98.7 °F (37.1 °C)-100.3 °F (37.9 °C)] 98.9 °F (37.2 °C)  Pulse:  [65-96] 66  Resp:  [7-49] 18  SpO2:  [87 %-99 %] 94 %  BP: ()/(50-67) 100/54  Arterial Line BP: ()/() 135/68     Weight: (!) 141.1 kg (311 lb)  Body mass index is 36.88 kg/m².    Intake/Output Summary (Last 24 hours) at 1/8/2019 1342  Last data filed at 1/8/2019 1200  Gross per 24 hour   Intake 1507.03 ml   Output 2630 ml   Net -1122.97 ml      Physical Exam   Constitutional: He appears well-developed and well-nourished.   HENT:   Right Ear: External ear normal.   Left Ear: External ear normal.   Nose: Nose normal.   ET/OG tubes in place   Eyes: Conjunctivae are normal. Right eye exhibits no discharge.   Cardiovascular: Normal rate and normal heart sounds.   No murmur heard.  Pulmonary/Chest: He has no wheezes. He has rales.   Mechanically ventilated. Coarse particularly over right base, less so than previously   Abdominal: Soft. Bowel sounds are normal. He exhibits no distension. There is no tenderness.   Musculoskeletal: He exhibits edema (trace BLE ). He exhibits no deformity.   Neurological:   Alert, follows commands and nod appropriately   Skin: Skin is warm and dry.       Significant Labs:   BMP:   Recent Labs   Lab 01/08/19  1144   GLU 96      K 4.1   CL 99   CO2 29   BUN 14   CREATININE 1.1   CALCIUM 8.6*   MG 2.1     CBC:   Recent Labs   Lab 01/07/19  0421 01/08/19  0334   WBC 8.04 8.37   HGB 11.8* 11.4*   HCT 34.8* 32.6*    210       Significant Imaging: I have reviewed all pertinent imaging results/findings within the past 24 hours.

## 2019-01-08 NOTE — SUBJECTIVE & OBJECTIVE
HPI:  Mr. Bhakta is a 62 yo male with HFrEF (EF 20%) and HTN who presented to the ED on 1/5/19 complaining of gradually worsening SOB with associated bilateral lower extremity swelling x1 week and productive cough. He is not on supplementary O2 at home. He reported compliance with his ;asix 80mg x2/day. He denied fever, chills, diaphoresis, nausea, emesis, diarrhea, abdominal pain, chest pain. Patient was hypoxic upon arrival to 80%. He was started on BiPAP and diuresis as his CXR noted significant pulmonary edema.     Hospital Course:  Mr. Bhakta was admitted for respiratory failure thought to be due to CHF exacerbation. He was started on BiPAP and diuresis. He had no leukocytosis or fever at the time of presentation. He had worsening respiratory failure the night of 1/5 and required intubation. Upon intubation he was found to have a copious amount of thick, yellow sputum, almost appearing purulent. He had been started on broad spectrum antibiotics with cefepime and vancomycin. Respiratory culture growing gram positive cocci in pairs and chains and GNR's. Shock also developed on the night of 1/5, suspected to be cardiogenic vs septic, so a central line was placed and levophed started. He developed a fever on 1/6 (102 F), antibiotics continued. Shock resolved on 1/8 and he has failed SBT's due to tachypnea, high work of breathing despite diuresis since 1/7.     Interval History/Significant Events: failed SBT this AM again with tachypnea, accessory muscle use. Weaned from vasopressors. Diuresing well, negative nearly 2 L over past day.     Objective:     Vital Signs (Most Recent):  Temp: 99.1 °F (37.3 °C) (01/08/19 0400)  Pulse: 68 (01/08/19 1000)  Resp: 17 (01/08/19 1000)  BP: (!) 94/51 (01/08/19 1000)  SpO2: 95 % (01/08/19 1000) Vital Signs (24h Range):  Temp:  [98.7 °F (37.1 °C)-100.3 °F (37.9 °C)] 99.1 °F (37.3 °C)  Pulse:  [66-96] 68  Resp:  [7-49] 17  SpO2:  [87 %-99 %] 95 %  BP: ()/(50-67)  94/51  Arterial Line BP: ()/() 135/68     Weight: (!) 141.4 kg (311 lb 11.7 oz)  Body mass index is 36.97 kg/m².      Intake/Output Summary (Last 24 hours) at 1/8/2019 1053  Last data filed at 1/8/2019 0900  Gross per 24 hour   Intake 1509.55 ml   Output 2880 ml   Net -1370.45 ml       Physical Exam   Constitutional: He appears well-developed. He has a sickly appearance. He appears ill. No distress. He is intubated.   HENT:   Head: Normocephalic and atraumatic.   Eyes: Conjunctivae are normal. Pupils are equal, round, and reactive to light. Right eye exhibits no discharge. Left eye exhibits no discharge. No scleral icterus.   Neck: Normal range of motion. Neck supple. JVD: unable to assess; TLC in place. No tracheal deviation present. No thyromegaly present.   Cardiovascular: Normal rate, regular rhythm, S1 normal and S2 normal.   Pulses:       Radial pulses are 2+ on the right side, and 2+ on the left side.        Dorsalis pedis pulses are 1+ on the right side, and 1+ on the left side.   LE's warm today. 1+ DP pulses palpated bilaterally    Pulmonary/Chest: No accessory muscle usage. He is intubated. No respiratory distress. He has no decreased breath sounds. He has no wheezes. He has rales in the right upper field, the right middle field, the left upper field and the left middle field.   Abdominal: Soft. Bowel sounds are normal. He exhibits no distension. There is no tenderness. There is no guarding.   Lymphadenopathy:     He has no cervical adenopathy.   Neurological: GCS eye subscore is 3. GCS verbal subscore is 1. GCS motor subscore is 6.   RASS -1 on light sedation with fentanyl. Will open eyes, follow commands when prompted.    Skin: Skin is dry. He is not diaphoretic. There is pallor.       Vents:  Vent Mode: PRVC (01/08/19 0830)  Ventilator Initiated: Yes (01/05/19 1945)  Set Rate: 16 bmp (01/08/19 0830)  Vt Set: 540 mL (01/08/19 0830)  Pressure Support: 10 cmH20 (01/07/19 0906)  PEEP/CPAP: 8  cmH20 (01/08/19 0830)  Oxygen Concentration (%): 30 (01/08/19 1000)  Peak Airway Pressure: 34.5 cmH2O (01/08/19 0830)  Total Ve: 9.6 mL (01/08/19 0830)  F/VT Ratio<105 (RSBI): (!) 82.63(Simultaneous filing. User may not have seen previous data.) (01/08/19 0830)    Lines/Drains/Airways     Central Venous Catheter Line                 Tunneled Central Line Insertion/Assessment - Triple Lumen  01/05/19 2130 right internal jugular 2 days          Drain                 NG/OG Tube 01/05/19 2200 orogastric 12 Fr. Left mouth 2 days         Urethral Catheter 01/05/19 1745 2 days          Airway                 Airway - Non-Surgical 01/05/19 1945 Endotracheal Tube 2 days                Significant Labs:    CBC/Anemia Profile:  Recent Labs   Lab 01/07/19  0421 01/08/19  0334   WBC 8.04 8.37   HGB 11.8* 11.4*   HCT 34.8* 32.6*    210   MCV 80* 81*   RDW 14.6* 14.9*        Chemistries:  Recent Labs   Lab 01/06/19  2318 01/07/19  0421 01/07/19  1329 01/07/19 2135 01/08/19  0334    138  --  138 137   K 3.0* 3.4* 3.8 3.6 3.4*    100  --  100 98   CO2 28 29  --  28 31*   BUN 13 12  --  12 12   CREATININE 0.9 0.9  --  1.0 0.9   CALCIUM 8.4* 8.6*  --  8.5* 8.7   MG 2.0 1.9  --  1.8 1.8   PHOS 1.4*  --   --   --   --        Significant Imaging:  I have reviewed all pertinent imaging results/findings within the past 24 hours.

## 2019-01-08 NOTE — PLAN OF CARE
Problem: Adult Inpatient Plan of Care  Goal: Plan of Care Review  Outcome: Ongoing (interventions implemented as appropriate)  Pt remains in ICU. SBT failed today and pt remains intubated and requires frequent suctioning Cardiology was consulted and dobutamine drip has been initiated. Pt remains on fentanyl. isosource changed to Peptide 1.5 to advance to a goal of 55, currently at 40 and pt is tolerating well. K and Mag replaced and repeat draws wdl. Lasix increased from BID to TID. Reported to pulmonology of EICU MD's concern when consulted about VTAC over night that central line appeared deep. Pulmonology reviewed film and stated it was in place and there would be no change.

## 2019-01-08 NOTE — PROGRESS NOTES
"[facility] Medicine  Progress Note    Patient Name: Papito Bhakta  MRN: [id number]  Patient Class: IP- Inpatient   Admission Date: 1/5/2019  Length of Stay: 3 days  Attending Physician: Jayden Gray MD  Primary Care Provider: Brynn To MD        Subjective:     Principal Problem:Acute respiratory failure with hypoxia    HPI:  Mr. Bhakta is a 64 yo man with combined HF (EF 20%, G3DD), AICD in place, Hyperlipidemia, and Hypertension who presented to the ED c/o gradually worsening and severe (10/10) SOB with associated bilateral lower extremity swelling x1 week. He also reports a hematemesis and productive cough with sputum described as "like grits." He is not on supplementary O2 at home. He is compliant with his Lasix 80mg x2/day. He denies fever, chills, diaphoresis, nausea, emesis, diarrhea, abdominal pain, chest pain, back pain, difficulty urinating and rash. Patient was hypoxic upon arrival to 80%. He was started on supplemental oxygen via BiPAP. CXR w/ acute pulmonary edema. He was started on IV Lasix. He claims to be complaint with a low salt diet and Lasix.    Hospital Course:  Mr. Bhakta was admitted for respiratory failure thought to be due to CHF exacerbation. He was started on BiPAP and diuresis. He had no leukocytosis or fever at the time of presentation. CXR was consistent with pulmonary edema without evidence of consolidation on presentation. He had worsening respiratory failure the night following admission and required intubation. Upon intubation he was found to have a copious amount of thick, yellow sputum that was almost appearing purulent. He had been started on broad spectrum antibiotics with cefepime and vancomycin. Resp stain with resp yung but NGTD. Blood cultures remain NGTD.  Episodes of Vtach and Cardiology consulted.    Interval History: Episodes of Vtach overnight.  Remains intubated.    Review of Systems   Unable to perform ROS: Intubated "     Objective:     Vital Signs (Most Recent):  Temp: 98.9 °F (37.2 °C) (01/08/19 1100)  Pulse: 66 (01/08/19 1230)  Resp: 18 (01/08/19 1230)  BP: (!) 100/54 (01/08/19 1115)  SpO2: (!) 94 % (01/08/19 1230) Vital Signs (24h Range):  Temp:  [98.7 °F (37.1 °C)-100.3 °F (37.9 °C)] 98.9 °F (37.2 °C)  Pulse:  [65-96] 66  Resp:  [7-49] 18  SpO2:  [87 %-99 %] 94 %  BP: ()/(50-67) 100/54  Arterial Line BP: ()/() 135/68     Weight: (!) 141.1 kg (311 lb)  Body mass index is 36.88 kg/m².    Intake/Output Summary (Last 24 hours) at 1/8/2019 1342  Last data filed at 1/8/2019 1200  Gross per 24 hour   Intake 1507.03 ml   Output 2630 ml   Net -1122.97 ml      Physical Exam   Constitutional: He appears well-developed and well-nourished.   HENT:   Right Ear: External ear normal.   Left Ear: External ear normal.   Nose: Nose normal.   ET/OG tubes in place   Eyes: Conjunctivae are normal. Right eye exhibits no discharge.   Cardiovascular: Normal rate and normal heart sounds.   No murmur heard.  Pulmonary/Chest: He has no wheezes. He has rales.   Mechanically ventilated. Coarse particularly over right base, less so than previously   Abdominal: Soft. Bowel sounds are normal. He exhibits no distension. There is no tenderness.   Musculoskeletal: He exhibits edema (trace BLE ). He exhibits no deformity.   Neurological:   Alert, follows commands and nod appropriately   Skin: Skin is warm and dry.       Significant Labs:   BMP:   Recent Labs   Lab 01/08/19  1144   GLU 96      K 4.1   CL 99   CO2 29   BUN 14   CREATININE 1.1   CALCIUM 8.6*   MG 2.1     CBC:   Recent Labs   Lab 01/07/19  0421 01/08/19  0334   WBC 8.04 8.37   HGB 11.8* 11.4*   HCT 34.8* 32.6*    210       Significant Imaging: I have reviewed all pertinent imaging results/findings within the past 24 hours.    Assessment/Plan:      * Acute respiratory failure with hypoxia    Intubated for respiratory failure due to CHF exacerbation and CAP.   Pulm/CC  consulted. No wheezes on exam. Broad spectrum abx.   Resp stain/Cx with resp yung but no bacterial growth on culture. BCx NGTD. Diurese with IV Lasix. SBT daily.  Appreciate Pulmonary input.  Doing better, but still requiring vent.  Hopefully extubate soon.     Shock    Septic shock.  Able to wean off Levophed.     CAP (community acquired pneumonia)    Empiric abx with Vanc/Cefepime. Resp Cx polymicrobial. Follow cultures.     Encephalopathy, metabolic    Agitated and required sedation as he was reaching for ET tube. 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 Weaned sedation with improvement in mental status on 1/7/2019.     Hyperlipidemia    On statin.     Acute pulmonary edema    Diurese with IV Lasix. Also component of pneumonia. BNP is more elevated than previously. Monitor UOP closely.     Acute on chronic combined systolic and diastolic congestive heart failure    EF of 20% and grade 3 diastolic CHF on echo on 11/2018. Diurese with IV Lasix. Hold BB/Entresto while hypotensive.     BMI 40.0-44.9, adult    Weight lose as out patient.     Essential hypertension    Hold home Entresto and Coreg for shock requiring Levophed. Given IV Lasix for diuresis.  Able to wean off Levophed.         VTE Risk Mitigation (From admission, onward)        Ordered     enoxaparin injection 40 mg  Daily      01/05/19 1036     IP VTE HIGH RISK PATIENT  Once      01/05/19 1037          Critical care time spent on the evaluation and treatment of severe organ dysfunction, review of pertinent labs and imaging studies, discussions with consulting providers and discussions with patient/family: 50 minutes.    Jayden Gray MD  Department of Hospital Medicine   [facility]

## 2019-01-08 NOTE — ASSESSMENT & PLAN NOTE
--2/2 cardiogenic pulmonary edema and concurrent pneumonia. Concerned that failure to wean from ventilator is more related to patient's severe HFrEF as new echo notes EF 10% and patient develops respiratory distress when on SBT's.   --have consulted cardiology to assist in optimization of HF regimen. Will likely need an inotrope, appreciate cardiology's assistance.   --continue diuresis with 100 mg lasix TID   --would continue Vanco, Cefepime given GPC's and GNR's in sputum culture  --failed SBT this AM with tachypnea, accessory muscle use. Will plan to repeat in AM

## 2019-01-08 NOTE — NURSING
Informed Dr Vazquez of runs of [facility] overnight and orders received from EMELY KHAN. Consult cards? Will also notify Dr Gray. Transferred daughter to medical records to obtain last cxr cd results from before hospitalization per Dr Vazquez

## 2019-01-08 NOTE — PLAN OF CARE
Problem: Adult Inpatient Plan of Care  Goal: Plan of Care Review  Outcome: Ongoing (interventions implemented as appropriate)  Pt remains in ICU. SBT initiated today and failed d/t pt's increased respirations, he still remains intubated. He has been awake and alert, following all commands the majority of the day. Pt has been receiving lasix and urine output is very well. He has required frequent suctioning obtaining white/yellow thick sputum. Pt's OGT was inadvertently removed today and replaced. Pulmonology was present and notified. Tube was advanced and XR obtained. Pt was started on Isosource 1.5 tube feedings and is luis well thus far

## 2019-01-08 NOTE — ASSESSMENT & PLAN NOTE
Hold home Entresto and Coreg for shock requiring Levophed. Given IV Lasix for diuresis.  Able to wean off Levophed.

## 2019-01-08 NOTE — HPI
"63 y.o M with CHF, Hyperlipidemia, and Hypertension presents to the ED c/o gradually worsening and severe (10/10) SOB with associated bilateral lower extremity swelling x1 week. He also reports a hematemesis and productive cough with sputum described as "like grits." He is not on supplementary O2 at home. He is compliant with his Lasix 80mg x2/day. He denies fever, chills, diaphoresis, nausea, emesis, hematemesis, diarrhea, abdominal pain, chest pain, back pain, difficulty urinating and rash. No prior tx.     Known to me from clinic and previously had refused biventricular ICD.  Currently intubated and unable to provide any adequate history.  Initially admitted for volume overload and on hospital day 2.  Developed significant respiratory distress felt initially to be secondary to aspiration requiring intubation.  He has had hypotension possibly secondary to sepsis versus cardiogenic.  He has been weaned from pressor and is currently stable but intubated.  Spoke with critical care nurse practitioner at bedside.  Currently having difficulty weaning ventilator despite decent diuresis.  Bedside echo performed shows severe biventricular dysfunction decreased from previous echo.  Initial recommendations for inotrope therapy.  "

## 2019-01-09 LAB
ALLENS TEST: ABNORMAL
ANION GAP SERPL CALC-SCNC: 10 MMOL/L
BASOPHILS # BLD AUTO: 0.05 K/UL
BASOPHILS NFR BLD: 0.6 %
BUN SERPL-MCNC: 21 MG/DL
CALCIUM SERPL-MCNC: 8.7 MG/DL
CHLORIDE SERPL-SCNC: 98 MMOL/L
CO2 SERPL-SCNC: 30 MMOL/L
CREAT SERPL-MCNC: 1.4 MG/DL
DELSYS: ABNORMAL
DIFFERENTIAL METHOD: ABNORMAL
EOSINOPHIL # BLD AUTO: 0.3 K/UL
EOSINOPHIL NFR BLD: 3.1 %
ERYTHROCYTE [DISTWIDTH] IN BLOOD BY AUTOMATED COUNT: 16.2 %
ERYTHROCYTE [SEDIMENTATION RATE] IN BLOOD BY WESTERGREN METHOD: 16 MM/H
EST. GFR  (AFRICAN AMERICAN): >60 ML/MIN/1.73 M^2
EST. GFR  (NON AFRICAN AMERICAN): 53 ML/MIN/1.73 M^2
FIO2: 0.3
GLUCOSE SERPL-MCNC: 116 MG/DL
HCO3 UR-SCNC: 30.4 MMOL/L (ref 24–28)
HCT VFR BLD AUTO: 31.9 %
HGB BLD-MCNC: 11.2 G/DL
LYMPHOCYTES # BLD AUTO: 1.4 K/UL
LYMPHOCYTES NFR BLD: 15.8 %
MAGNESIUM SERPL-MCNC: 1.9 MG/DL
MCH RBC QN AUTO: 29.9 PG
MCHC RBC AUTO-ENTMCNC: 35.1 G/DL
MCV RBC AUTO: 85 FL
MIN VOL: 12.3
MODE: ABNORMAL
MONOCYTES # BLD AUTO: 1.1 K/UL
MONOCYTES NFR BLD: 12.1 %
NEUTROPHILS # BLD AUTO: 6 K/UL
NEUTROPHILS NFR BLD: 68.7 %
PCO2 BLDA: 45.1 MMHG (ref 35–45)
PEEP: 8
PH SMN: 7.44 [PH] (ref 7.35–7.45)
PHOSPHATE SERPL-MCNC: 4.2 MG/DL
PLATELET # BLD AUTO: 211 K/UL
PMV BLD AUTO: 11.7 FL
PO2 BLDA: 54 MMHG (ref 80–100)
POC BE: 5 MMOL/L
POC SATURATED O2: 89 % (ref 95–100)
POC TCO2: 32 MMOL/L (ref 23–27)
POTASSIUM SERPL-SCNC: 3.5 MMOL/L
RBC # BLD AUTO: 3.75 M/UL
SAMPLE: ABNORMAL
SITE: ABNORMAL
SODIUM SERPL-SCNC: 138 MMOL/L
SP02: 93
VT: 540
WBC # BLD AUTO: 8.71 K/UL

## 2019-01-09 PROCEDURE — 94761 N-INVAS EAR/PLS OXIMETRY MLT: CPT | Mod: HCNC

## 2019-01-09 PROCEDURE — 63600175 PHARM REV CODE 636 W HCPCS: Mod: HCNC | Performed by: HOSPITALIST

## 2019-01-09 PROCEDURE — 99291 PR CRITICAL CARE, E/M 30-74 MINUTES: ICD-10-PCS | Mod: HCNC,,, | Performed by: NURSE PRACTITIONER

## 2019-01-09 PROCEDURE — 36415 COLL VENOUS BLD VENIPUNCTURE: CPT | Mod: HCNC

## 2019-01-09 PROCEDURE — 84100 ASSAY OF PHOSPHORUS: CPT | Mod: HCNC

## 2019-01-09 PROCEDURE — 87632 RESP VIRUS 6-11 TARGETS: CPT | Mod: HCNC

## 2019-01-09 PROCEDURE — 99291 CRITICAL CARE FIRST HOUR: CPT | Mod: HCNC,,, | Performed by: NURSE PRACTITIONER

## 2019-01-09 PROCEDURE — 82803 BLOOD GASES ANY COMBINATION: CPT | Mod: HCNC

## 2019-01-09 PROCEDURE — 99900035 HC TECH TIME PER 15 MIN (STAT): Mod: HCNC

## 2019-01-09 PROCEDURE — 99233 PR SUBSEQUENT HOSPITAL CARE,LEVL III: ICD-10-PCS | Mod: HCNC,,, | Performed by: INTERNAL MEDICINE

## 2019-01-09 PROCEDURE — 63600175 PHARM REV CODE 636 W HCPCS: Mod: HCNC | Performed by: NURSE PRACTITIONER

## 2019-01-09 PROCEDURE — 63600175 PHARM REV CODE 636 W HCPCS: Mod: HCNC | Performed by: INTERNAL MEDICINE

## 2019-01-09 PROCEDURE — 80048 BASIC METABOLIC PNL TOTAL CA: CPT | Mod: HCNC

## 2019-01-09 PROCEDURE — 99900026 HC AIRWAY MAINTENANCE (STAT): Mod: HCNC

## 2019-01-09 PROCEDURE — 83735 ASSAY OF MAGNESIUM: CPT | Mod: HCNC

## 2019-01-09 PROCEDURE — 25000003 PHARM REV CODE 250: Mod: HCNC | Performed by: INTERNAL MEDICINE

## 2019-01-09 PROCEDURE — 27000221 HC OXYGEN, UP TO 24 HOURS: Mod: HCNC

## 2019-01-09 PROCEDURE — 84145 PROCALCITONIN (PCT): CPT | Mod: HCNC

## 2019-01-09 PROCEDURE — 25000003 PHARM REV CODE 250: Mod: HCNC | Performed by: EMERGENCY MEDICINE

## 2019-01-09 PROCEDURE — 25000003 PHARM REV CODE 250: Mod: HCNC | Performed by: HOSPITALIST

## 2019-01-09 PROCEDURE — 63600175 PHARM REV CODE 636 W HCPCS: Mod: HCNC | Performed by: EMERGENCY MEDICINE

## 2019-01-09 PROCEDURE — 20000000 HC ICU ROOM: Mod: HCNC

## 2019-01-09 PROCEDURE — 36600 WITHDRAWAL OF ARTERIAL BLOOD: CPT | Mod: HCNC

## 2019-01-09 PROCEDURE — 25000003 PHARM REV CODE 250: Mod: HCNC | Performed by: NURSE PRACTITIONER

## 2019-01-09 PROCEDURE — 85025 COMPLETE CBC W/AUTO DIFF WBC: CPT | Mod: HCNC

## 2019-01-09 PROCEDURE — 99233 SBSQ HOSP IP/OBS HIGH 50: CPT | Mod: HCNC,,, | Performed by: INTERNAL MEDICINE

## 2019-01-09 RX ORDER — FUROSEMIDE 10 MG/ML
140 INJECTION INTRAMUSCULAR; INTRAVENOUS ONCE
Status: DISCONTINUED | OUTPATIENT
Start: 2019-01-09 | End: 2019-01-09

## 2019-01-09 RX ORDER — FUROSEMIDE 10 MG/ML
40 INJECTION INTRAMUSCULAR; INTRAVENOUS 2 TIMES DAILY
Status: DISCONTINUED | OUTPATIENT
Start: 2019-01-09 | End: 2019-01-11

## 2019-01-09 RX ADMIN — CEFEPIME HYDROCHLORIDE 1 G: 1 INJECTION, SOLUTION INTRAVENOUS at 07:01

## 2019-01-09 RX ADMIN — PANTOPRAZOLE SODIUM 40 MG: 40 GRANULE, DELAYED RELEASE ORAL at 08:01

## 2019-01-09 RX ADMIN — CHLORHEXIDINE GLUCONATE 0.12% ORAL RINSE 15 ML: 1.2 LIQUID ORAL at 09:01

## 2019-01-09 RX ADMIN — CHLORHEXIDINE GLUCONATE 0.12% ORAL RINSE 15 ML: 1.2 LIQUID ORAL at 08:01

## 2019-01-09 RX ADMIN — Medication 25 MCG/HR: at 12:01

## 2019-01-09 RX ADMIN — DOBUTAMINE IN DEXTROSE 2 MCG/KG/MIN: 200 INJECTION, SOLUTION INTRAVENOUS at 07:01

## 2019-01-09 RX ADMIN — FUROSEMIDE 40 MG: 10 INJECTION, SOLUTION INTRAVENOUS at 10:01

## 2019-01-09 RX ADMIN — FUROSEMIDE 40 MG: 10 INJECTION, SOLUTION INTRAVENOUS at 07:01

## 2019-01-09 RX ADMIN — PRAVASTATIN SODIUM 80 MG: 40 TABLET ORAL at 08:01

## 2019-01-09 RX ADMIN — ASPIRIN 325 MG ORAL TABLET 325 MG: 325 PILL ORAL at 08:01

## 2019-01-09 RX ADMIN — VANCOMYCIN HYDROCHLORIDE 1500 MG: 1 INJECTION, POWDER, LYOPHILIZED, FOR SOLUTION INTRAVENOUS at 03:01

## 2019-01-09 RX ADMIN — ENOXAPARIN SODIUM 40 MG: 100 INJECTION SUBCUTANEOUS at 07:01

## 2019-01-09 NOTE — NURSING
Pt off unit to CT department via bed. Portable ventilator and hemodynamic monitor in use. Pt able to assist in transfer to CT table. During movement pt had a coughing spell that caused dislodged of mucus plug to ETT. Pt suctioned and O2 sat remained un compromised. Pt became too anxious to continue test and pt was brought back to the ICU with CT scan not completed.

## 2019-01-09 NOTE — SUBJECTIVE & OBJECTIVE
Interval History:  Patient is alert and responsive this a.m..  They are trying to wean ventilator.    Telemetry:  Normal sinus rhythm    Review of Systems   Unable to perform ROS: intubated     Objective:     Vital Signs (Most Recent):  Temp: 98.8 °F (37.1 °C) (01/09/19 0730)  Pulse: 74 (01/09/19 0815)  Resp: 19 (01/09/19 0815)  BP: (!) 99/56 (01/09/19 0800)  SpO2: 96 % (01/09/19 0815) Vital Signs (24h Range):  Temp:  [98.8 °F (37.1 °C)-99.7 °F (37.6 °C)] 98.8 °F (37.1 °C)  Pulse:  [65-96] 74  Resp:  [16-34] 19  SpO2:  [91 %-100 %] 96 %  BP: ()/(50-79) 99/56     Weight: (!) 141.1 kg (311 lb)  Body mass index is 36.88 kg/m².     SpO2: 96 %  O2 Device (Oxygen Therapy): ventilator      Intake/Output Summary (Last 24 hours) at 1/9/2019 0914  Last data filed at 1/9/2019 0730  Gross per 24 hour   Intake 1982.53 ml   Output 1720 ml   Net 262.53 ml       Lines/Drains/Airways     Central Venous Catheter Line                 Tunneled Central Line Insertion/Assessment - Triple Lumen  01/05/19 2130 right internal jugular 3 days          Drain                 NG/OG Tube 01/05/19 2200 orogastric 12 Fr. Left mouth 3 days         Urethral Catheter 01/05/19 1745 3 days          Airway                 Airway - Non-Surgical 01/05/19 1945 Endotracheal Tube 3 days                Physical Exam   Constitutional: He is oriented to person, place, and time. No distress.   Cardiovascular: Normal rate and regular rhythm.   Pulmonary/Chest: Effort normal and breath sounds normal.   Abdominal: Soft. Bowel sounds are normal.   Musculoskeletal: He exhibits no edema.   Neurological: He is alert and oriented to person, place, and time.   Intubated but awake and responsive   Skin: Skin is warm and dry.       Significant Labs:   ABG:   Recent Labs   Lab 01/07/19  1014 01/08/19  0534 01/09/19  0435   PH 7.390 7.426 7.436   PCO2 46.9* 42.2 45.1*   HCO3 28.4* 27.8 30.4*   POCSATURATED 70* 92* 89*   BE 3 3 5   , BMP:   Recent Labs   Lab  01/08/19  0334 01/08/19  1144 01/09/19  0409    96 116*    137 138   K 3.4* 4.1 3.5   CL 98 99 98   CO2 31* 29 30*   BUN 12 14 21   CREATININE 0.9 1.1 1.4   CALCIUM 8.7 8.6* 8.7   MG 1.8 2.1 1.9    and CBC   Recent Labs   Lab 01/08/19  0334 01/09/19  0409   WBC 8.37 8.71   HGB 11.4* 11.2*   HCT 32.6* 31.9*    211       Significant Imaging: Echocardiogram:   Transthoracic echo (TTE) complete (Cupid Only):   Results for orders placed or performed during the hospital encounter of 11/04/18   Transthoracic echo (TTE) complete (Cupid Only)   Result Value Ref Range    BSA 2.84 m2    Ascending aorta 3.43 cm    STJ 2.46 cm    AV mean gradient 5.10 mmHg    Ao peak nicola 1.43 m/s    Ao VTI 24.82 cm    IVRT 0.09 msec    IVS 1.18 (A) 0.6 - 1.1 cm    LA size 5.85 cm    Left Atrium Major Axis 6.77 cm    Left Atrium Minor Axis 6.36 cm    LVIDD 6.97 (A) 3.5 - 6.0 cm    LVIDS 6.19 (A) 2.1 - 4.0 cm    LVOT diameter 2.40 cm    LVOT peak VTI 15.73 cm    PW 1.15 (A) 0.6 - 1.1 cm    MV Peak A Nicola 0.42 m/s    E wave decelartion time 146.03 msec    MV Peak E Nicola 1.18 m/s    RA Major Axis 5.39 cm    RA Width 4.82 cm    RVDD 5.41 cm    Sinus 3.73 cm    TR Max Nicola 2.44 m/s    TDI LATERAL 0.10     TDI SEPTAL 0.09     LA WIDTH 5.23 cm    Ao root annulus 3.42 cm    AORTIC VALVE CUSP SEPERATION 2.02 cm    PV PEAK VELOCITY 1.09 cm/s    PV peak gradient 4.71 mmHg    MV stenosis pressure 1/2 time 42.35 ms    AV peak gradient 8.22 mmHg    LV Diastolic Volume 253.21 mL    LV Systolic Volume 193.03 mL    LV LATERAL E/E' RATIO 11.80     LV SEPTAL E/E' RATIO 13.11     FS 11 %    QEF 23.77 %    LA volume 170.56 cm3    LV mass 388.10 g    Left Ventricle Relative Wall Thickness 0.33 cm    AV valve area 2.87 cm2    MV valve area p 1/2 method 5.19 cm2    E/A ratio 2.81     Mean e' 0.10     LVOT area 4.52 cm2    LVOT stroke volume 71.12 cm3    E/E' ratio 12.42     LV Systolic Volume Index 68.0 mL/m2    LV Diastolic Volume Index 89.16 mL/m2     LA Volume Index 60.1 mL/m2    LV Mass Index 136.7 g/m2    Triscuspid Valve Regurgitation Peak Gradient 23.81 mmHg    Right Atrial Pressure (from IVC) 8.0 mmHg    TV rest pulmonary artery pressure 31.81 mmHg

## 2019-01-09 NOTE — SUBJECTIVE & OBJECTIVE
HPI:  Mr. Bhakta is a 64 yo male with HFrEF (EF 20%) and HTN who presented to the ED on 1/5/19 complaining of gradually worsening SOB with associated bilateral lower extremity swelling x1 week and productive cough. He is not on supplementary O2 at home. He reported compliance with his lasix 80mg x2/day. He denied fever, chills, diaphoresis, nausea, emesis, diarrhea, abdominal pain, chest pain. Patient was hypoxic upon arrival to 80%. He was started on BiPAP and diuresis as his CXR noted significant pulmonary edema.     Hospital Course:  Mr. Bhakta was admitted for respiratory failure thought to be due to CHF exacerbation. He was started on BiPAP and diuresed. He had no leukocytosis or fever at the time of presentation. He had worsening respiratory failure the night of 1/5 and required intubation. Upon intubation he was found to have a copious amount of thick, yellow sputum, almost appearing purulent. He had been started on broad spectrum antibiotics with cefepime and vancomycin. Respiratory culture grew gram positive cocci in pairs and chains and GNR's. Shock also developed on the night of 1/5, suspected to be cardiogenic vs septic, so a central line was placed and levophed started. He developed a fever on 1/6 (102 F), antibiotics continued. Shock resolved on 1/8 and he has failed SBT's due to tachypnea, high work of breathing despite diuresis since 1/7. Cardiology consulted on 1/8 to help with optimization of HF regimen as repeat 2D echo noted worsening of EF to 10%.  Dobutamine 2 mcg/kg/min stated on 1/8 and diuresis continued.     Interval History/Significant Events: 2L urine output over past day, however +400 ml/day (some is enteral feeding). Alert, without complaints today with no sedation. Will plan for SBT. Afebrile.    Objective:     Vital Signs (Most Recent):  Temp: 98.8 °F (37.1 °C) (01/09/19 0730)  Pulse: 73 (01/09/19 0930)  Resp: 18 (01/09/19 0930)  BP: (!) 99/56 (01/09/19 0800)  SpO2: 97 % (01/09/19  0930) Vital Signs (24h Range):  Temp:  [98.8 °F (37.1 °C)-99.7 °F (37.6 °C)] 98.8 °F (37.1 °C)  Pulse:  [65-96] 73  Resp:  [16-34] 18  SpO2:  [91 %-100 %] 97 %  BP: ()/(50-79) 99/56     Weight: (!) 141.1 kg (311 lb)  Body mass index is 36.88 kg/m².      Intake/Output Summary (Last 24 hours) at 1/9/2019 0951  Last data filed at 1/9/2019 0730  Gross per 24 hour   Intake 1982.53 ml   Output 1720 ml   Net 262.53 ml       Physical Exam   Constitutional: He appears well-developed. He has a sickly appearance. He appears ill. No distress. He is intubated.   HENT:   Head: Normocephalic and atraumatic.   Eyes: Conjunctivae are normal. Pupils are equal, round, and reactive to light. Right eye exhibits no discharge. Left eye exhibits no discharge. No scleral icterus.   Neck: Normal range of motion. Neck supple. JVD: unable to assess; TLC in place. No tracheal deviation present. No thyromegaly present.   Cardiovascular: Normal rate, regular rhythm, S1 normal and S2 normal.   Pulses:       Radial pulses are 2+ on the right side, and 2+ on the left side.        Dorsalis pedis pulses are 1+ on the right side, and 1+ on the left side.   LE's warm today. 1+ DP pulses palpated bilaterally    Pulmonary/Chest: No accessory muscle usage. He is intubated. No respiratory distress. He has no decreased breath sounds. He has no wheezes. He has rales in the right upper field, the right middle field, the left upper field and the left middle field.   Abdominal: Soft. Bowel sounds are normal. He exhibits no distension. There is no tenderness. There is no guarding.   Lymphadenopathy:     He has no cervical adenopathy.   Neurological: GCS eye subscore is 4. GCS verbal subscore is 1. GCS motor subscore is 6.   RASS 0, alert, interactive, able to follow commands consistently. Moves all extremities when prompted with good strength.    Skin: Skin is dry. He is not diaphoretic. There is pallor.       Vents:  Vent Mode: PRVC (01/09/19  0930)  Ventilator Initiated: Yes (01/05/19 1945)  Set Rate: 16 bmp (01/09/19 0930)  Vt Set: 540 mL (01/09/19 0930)  Pressure Support: 10 cmH20 (01/07/19 0906)  PEEP/CPAP: 8 cmH20 (01/09/19 0930)  Oxygen Concentration (%): 35 (01/09/19 0930)  Peak Airway Pressure: 30.1 cmH2O (01/09/19 0930)  Total Ve: 9.7 mL (01/09/19 0930)  F/VT Ratio<105 (RSBI): (!) 31.36 (01/09/19 0930)    Lines/Drains/Airways     Central Venous Catheter Line                 Tunneled Central Line Insertion/Assessment - Triple Lumen  01/05/19 2130 right internal jugular 3 days          Drain                 NG/OG Tube 01/05/19 2200 orogastric 12 Fr. Left mouth 3 days         Urethral Catheter 01/05/19 1745 3 days          Airway                 Airway - Non-Surgical 01/05/19 1945 Endotracheal Tube 3 days                Significant Labs:    CBC/Anemia Profile:  Recent Labs   Lab 01/08/19  0334 01/09/19  0409   WBC 8.37 8.71   HGB 11.4* 11.2*   HCT 32.6* 31.9*    211   MCV 81* 85   RDW 14.9* 16.2*        Chemistries:  Recent Labs   Lab 01/08/19  0334 01/08/19  1144 01/09/19  0409    137 138   K 3.4* 4.1 3.5   CL 98 99 98   CO2 31* 29 30*   BUN 12 14 21   CREATININE 0.9 1.1 1.4   CALCIUM 8.7 8.6* 8.7   MG 1.8 2.1 1.9   PHOS  --   --  4.2       Significant Imaging:  I have reviewed all pertinent imaging results/findings within the past 24 hours.

## 2019-01-09 NOTE — ASSESSMENT & PLAN NOTE
--2/2 sedation.   --continue low dose fentanyl gtt for pain/sedation. Titrate to RASS -1  --with SAT, patient awakens easily, follows commands and appears comfortable.

## 2019-01-09 NOTE — PLAN OF CARE
Problem: Adult Inpatient Plan of Care  Goal: Plan of Care Review  Outcome: Ongoing (interventions implemented as appropriate)  Pt resting well in bed, denies discomforts. Pt recovered well from episode earlier. Frequent oral care done for excessive secretions. Pt repositioned for comfort. Active ROM encouraged and pt able to demonstrate instructions. Frequent ET suctioning with lavage due to very thick secretions. Pt had low grade temp earlier. Pt free of injury. Complete bed bath done with linen change. Pt tolerated activity well. Pt remains on ventilator, restraints in use.

## 2019-01-09 NOTE — ASSESSMENT & PLAN NOTE
EF of 20% and grade 3 diastolic CHF on echo on 11/2018. Diurese with IV Lasix. Hold BB/Entresto while hypotensive.  Started on Dobutamine per Cards.  Creat increasing.  May need to cut back on diuresis.

## 2019-01-09 NOTE — PHYSICIAN QUERY
"PT Name: Papito Bhakta  MR #: [id number]     Physician Query Form - Documentation Clarification      CDS/: Nell Marcial RN CDI           Contact information: [email or web address]    This form is a permanent document in the medical record.     Query Date: January 9, 2019    By submitting this query, we are merely seeking further clarification of documentation. Please utilize your independent clinical judgment when addressing the question(s) below.    The Medical record reflects the following:    Supporting Clinical Findings Location in Medical Record   BMI = 35.6   1/5 0824  Weight = 136.1 KG  (300 lbs.)  Height = 6'5"    BMI = 36.96 1/7 701  Weight = 141.1 KG   Vital signs      BMI 40.0-44.9, adult    Weight lose  as out patient.     Hospital medicine note 1/5 1013 am                                                                            Doctor, Please specify diagnosis or diagnoses associated with above clinical findings.    Provider Use Only        [ x ] Obesity (BMI >30), specify cause                  ( x ) Due to excess calories                  (  ) Due to drugs                  (  ) Other, specify_____________      [  ] Morbid (BMI > 40), specify cause                  (  ) Due to excess calories                  (  ) Due to drugs                  (  ) Other, specify_____________      [  ] Other, specify_________________.                                                                                                                   [  ] Clinically Undetermined               "

## 2019-01-09 NOTE — PROGRESS NOTES
"[facility]  Progress Note    Patient Name: Papito Bhakta  MRN: [id number]  Patient Class: IP- Inpatient   Admission Date: 1/5/2019  Length of Stay: 4 days  Attending Physician: Jayden Gray MD  Primary Care Provider: Brynn To MD        Subjective:     Principal Problem:Acute respiratory failure with hypoxia    HPI:  Mr. Bhakta is a 64 yo man with combined HF (EF 20%, G3DD), AICD in place, Hyperlipidemia, and Hypertension who presented to the ED c/o gradually worsening and severe (10/10) SOB with associated bilateral lower extremity swelling x1 week. He also reports a hematemesis and productive cough with sputum described as "like grits." He is not on supplementary O2 at home. He is compliant with his Lasix 80mg x2/day. He denies fever, chills, diaphoresis, nausea, emesis, diarrhea, abdominal pain, chest pain, back pain, difficulty urinating and rash. Patient was hypoxic upon arrival to 80%. He was started on supplemental oxygen via BiPAP. CXR w/ acute pulmonary edema. He was started on IV Lasix. He claims to be complaint with a low salt diet and Lasix.    Hospital Course:  Mr. Bhakta was admitted for respiratory failure thought to be due to CHF exacerbation. He was started on BiPAP and diuresis. He had no leukocytosis or fever at the time of presentation. CXR was consistent with pulmonary edema without evidence of consolidation on presentation. He had worsening respiratory failure the night following admission and required intubation. Upon intubation he was found to have a copious amount of thick, yellow sputum that was almost appearing purulent. He had been started on broad spectrum antibiotics with cefepime and vancomycin. Resp stain with resp yung but NGTD. Blood cultures remain NGTD.  Episodes of Vtach and Cardiology consulted.  Started on Dobutamine per Cards.    Interval History: Remains intubated.    Review of Systems   Unable to perform ROS: Intubated "     Objective:     Vital Signs (Most Recent):  Temp: 98.1 °F (36.7 °C) (01/09/19 1500)  Pulse: 79 (01/09/19 1630)  Resp: 20 (01/09/19 1630)  BP: 127/71 (01/09/19 1630)  SpO2: 99 % (01/09/19 1630) Vital Signs (24h Range):  Temp:  [98.1 °F (36.7 °C)-99.7 °F (37.6 °C)] 98.1 °F (36.7 °C)  Pulse:  [] 79  Resp:  [16-32] 20  SpO2:  [94 %-100 %] 99 %  BP: ()/(50-79) 127/71     Weight: (!) 141.1 kg (311 lb)  Body mass index is 36.88 kg/m².    Intake/Output Summary (Last 24 hours) at 1/9/2019 1717  Last data filed at 1/9/2019 0730  Gross per 24 hour   Intake 1541.21 ml   Output 835 ml   Net 706.21 ml      Physical Exam   Constitutional: He appears well-developed and well-nourished.   HENT:   Right Ear: External ear normal.   Left Ear: External ear normal.   Nose: Nose normal.   ET/OG tubes in place   Eyes: Conjunctivae are normal. Right eye exhibits no discharge.   Cardiovascular: Normal rate and normal heart sounds.   No murmur heard.  Pulmonary/Chest: He has no wheezes. He has rales.   Mechanically ventilated. Coarse particularly over right base, less so than previously   Abdominal: Soft. Bowel sounds are normal. He exhibits no distension. There is no tenderness.   Musculoskeletal: He exhibits edema (trace BLE ). He exhibits no deformity.   Neurological:   Alert, follows commands and nod appropriately   Skin: Skin is warm and dry.       Significant Labs:   BMP:   Recent Labs   Lab 01/09/19  0409   *      K 3.5   CL 98   CO2 30*   BUN 21   CREATININE 1.4   CALCIUM 8.7   MG 1.9     CBC:   Recent Labs   Lab 01/08/19  0334 01/09/19  0409   WBC 8.37 8.71   HGB 11.4* 11.2*   HCT 32.6* 31.9*    211       Significant Imaging: I have reviewed all pertinent imaging results/findings within the past 24 hours.    Assessment/Plan:      * Acute respiratory failure with hypoxia    Intubated for respiratory failure due to CHF exacerbation and CAP.   Pulm/CC consulted. No wheezes on exam. Broad spectrum abx.    Resp stain/Cx with resp yung but no bacterial growth on culture. BCx NGTD. Diurese with IV Lasix. SBT daily.  Appreciate Pulmonary input.  Doing better, but still requiring vent.  Hopefully extubate soon.     Shock    Septic shock.  Able to wean off Levophed.     CAP (community acquired pneumonia)    Empiric abx with Vanc/Cefepime. Resp Cx polymicrobial. Follow cultures.     Encephalopathy, metabolic    Agitated and required sedation as he was reaching for ET tube. 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 Weaned sedation with improvement in mental status on 1/7/2019.     Hyperlipidemia    On statin.     Acute pulmonary edema    Diurese with IV Lasix. Also component of pneumonia. BNP is more elevated than previously. Monitor UOP closely.     Acute on chronic combined systolic and diastolic congestive heart failure    EF of 20% and grade 3 diastolic CHF on echo on 11/2018. Diurese with IV Lasix. Hold BB/Entresto while hypotensive.  Started on Dobutamine per Cards.  Creat increasing.  May need to cut back on diuresis.     BMI 40.0-44.9, adult    Weight lose as out patient.     Essential hypertension    Hold home Entresto and Coreg for shock requiring Levophed. Given IV Lasix for diuresis.  Able to wean off Levophed.         VTE Risk Mitigation (From admission, onward)        Ordered     enoxaparin injection 40 mg  Daily      01/05/19 1036     IP VTE HIGH RISK PATIENT  Once      01/05/19 1037          Critical care time spent on the evaluation and treatment of severe organ dysfunction, review of pertinent labs and imaging studies, discussions with consulting providers and discussions with patient/family: 40 minutes.    Jayden Gray MD  Department of Hospital Medicine   [facility]

## 2019-01-09 NOTE — PLAN OF CARE
01/09/19 1735   Discharge Reassessment   Assessment Type Discharge Planning Reassessment   Provided patient/caregiver education on the expected discharge date and the discharge plan No   Do you have any problems affording any of your prescribed medications? No   Discharge Plan A Home Health   Discharge Plan B Skilled Nursing Facility   Patient choice form signed by patient/caregiver No   Can the patient answer the patient profile reliably? No, cognitively impaired  (follows directions with decrease in sedation)   How does the patient rate their overall health at the present time? Fair   Describe the patient's ability to walk at the present time. Does not walk or unable to take any steps at all   How often would a person be available to care for the patient? Often   Number of comorbid conditions (as recorded on the chart) Four   During the past month, has the patient often been bothered by feeling down, depressed or hopeless? No   During the past month, has the patient often been bothered by little interest or pleasure in doing things? No   patient remains on vent (weaning) in ICU. BARB reviewed chart, discussed with Dr Gray in bed huddle. Will continue to follow in ICU and assist as needed.

## 2019-01-09 NOTE — SUBJECTIVE & OBJECTIVE
Interval History: Remains intubated.    Review of Systems   Unable to perform ROS: Intubated     Objective:     Vital Signs (Most Recent):  Temp: 98.1 °F (36.7 °C) (01/09/19 1500)  Pulse: 79 (01/09/19 1630)  Resp: 20 (01/09/19 1630)  BP: 127/71 (01/09/19 1630)  SpO2: 99 % (01/09/19 1630) Vital Signs (24h Range):  Temp:  [98.1 °F (36.7 °C)-99.7 °F (37.6 °C)] 98.1 °F (36.7 °C)  Pulse:  [] 79  Resp:  [16-32] 20  SpO2:  [94 %-100 %] 99 %  BP: ()/(50-79) 127/71     Weight: (!) 141.1 kg (311 lb)  Body mass index is 36.88 kg/m².    Intake/Output Summary (Last 24 hours) at 1/9/2019 1717  Last data filed at 1/9/2019 0730  Gross per 24 hour   Intake 1541.21 ml   Output 835 ml   Net 706.21 ml      Physical Exam   Constitutional: He appears well-developed and well-nourished.   HENT:   Right Ear: External ear normal.   Left Ear: External ear normal.   Nose: Nose normal.   ET/OG tubes in place   Eyes: Conjunctivae are normal. Right eye exhibits no discharge.   Cardiovascular: Normal rate and normal heart sounds.   No murmur heard.  Pulmonary/Chest: He has no wheezes. He has rales.   Mechanically ventilated. Coarse particularly over right base, less so than previously   Abdominal: Soft. Bowel sounds are normal. He exhibits no distension. There is no tenderness.   Musculoskeletal: He exhibits edema (trace BLE ). He exhibits no deformity.   Neurological:   Alert, follows commands and nod appropriately   Skin: Skin is warm and dry.       Significant Labs:   BMP:   Recent Labs   Lab 01/09/19  0409   *      K 3.5   CL 98   CO2 30*   BUN 21   CREATININE 1.4   CALCIUM 8.7   MG 1.9     CBC:   Recent Labs   Lab 01/08/19  0334 01/09/19  0409   WBC 8.37 8.71   HGB 11.4* 11.2*   HCT 32.6* 31.9*    211       Significant Imaging: I have reviewed all pertinent imaging results/findings within the past 24 hours.

## 2019-01-09 NOTE — ASSESSMENT & PLAN NOTE
Severe biventricular dysfunction  Previously had refused biventricular ICD  Will benefit from low-dose inotrope  Continue IV diuresis

## 2019-01-09 NOTE — PROGRESS NOTES
[facility]  Cardiology  Progress Note    Patient Name: Papito Bhakta  MRN: [id number]  Admission Date: 1/5/2019  Hospital Length of Stay: 4 days  Code Status: Full Code   Attending Physician: Jayden Gray MD   Primary Care Physician: Brynn To MD  Expected Discharge Date:   Principal Problem:Acute respiratory failure with hypoxia    Subjective:     Hospital Course:   1-7:  Intubated post respiratory failure  1-8:  Started on dobutamine infusion    Interval History:  Patient is alert and responsive this a.m..  They are trying to wean ventilator.    Telemetry:  Normal sinus rhythm    Review of Systems   Unable to perform ROS: intubated     Objective:     Vital Signs (Most Recent):  Temp: 98.8 °F (37.1 °C) (01/09/19 0730)  Pulse: 74 (01/09/19 0815)  Resp: 19 (01/09/19 0815)  BP: (!) 99/56 (01/09/19 0800)  SpO2: 96 % (01/09/19 0815) Vital Signs (24h Range):  Temp:  [98.8 °F (37.1 °C)-99.7 °F (37.6 °C)] 98.8 °F (37.1 °C)  Pulse:  [65-96] 74  Resp:  [16-34] 19  SpO2:  [91 %-100 %] 96 %  BP: ()/(50-79) 99/56     Weight: (!) 141.1 kg (311 lb)  Body mass index is 36.88 kg/m².     SpO2: 96 %  O2 Device (Oxygen Therapy): ventilator      Intake/Output Summary (Last 24 hours) at 1/9/2019 0914  Last data filed at 1/9/2019 0730  Gross per 24 hour   Intake 1982.53 ml   Output 1720 ml   Net 262.53 ml       Lines/Drains/Airways     Central Venous Catheter Line                 Tunneled Central Line Insertion/Assessment - Triple Lumen  01/05/19 2130 right internal jugular 3 days          Drain                 NG/OG Tube 01/05/19 2200 orogastric 12 Fr. Left mouth 3 days         Urethral Catheter 01/05/19 1745 3 days          Airway                 Airway - Non-Surgical 01/05/19 1945 Endotracheal Tube 3 days                Physical Exam   Constitutional: He is oriented to person, place, and time. No distress.   Cardiovascular: Normal rate and regular rhythm.   Pulmonary/Chest: Effort normal and breath  sounds normal.   Abdominal: Soft. Bowel sounds are normal.   Musculoskeletal: He exhibits no edema.   Neurological: He is alert and oriented to person, place, and time.   Intubated but awake and responsive   Skin: Skin is warm and dry.       Significant Labs:   ABG:   Recent Labs   Lab 01/07/19  1014 01/08/19  0534 01/09/19  0435   PH 7.390 7.426 7.436   PCO2 46.9* 42.2 45.1*   HCO3 28.4* 27.8 30.4*   POCSATURATED 70* 92* 89*   BE 3 3 5   , BMP:   Recent Labs   Lab 01/08/19  0334 01/08/19  1144 01/09/19  0409    96 116*    137 138   K 3.4* 4.1 3.5   CL 98 99 98   CO2 31* 29 30*   BUN 12 14 21   CREATININE 0.9 1.1 1.4   CALCIUM 8.7 8.6* 8.7   MG 1.8 2.1 1.9    and CBC   Recent Labs   Lab 01/08/19  0334 01/09/19  0409   WBC 8.37 8.71   HGB 11.4* 11.2*   HCT 32.6* 31.9*    211       Significant Imaging: Echocardiogram:   Transthoracic echo (TTE) complete (Cupid Only):   Results for orders placed or performed during the hospital encounter of 11/04/18   Transthoracic echo (TTE) complete (Cupid Only)   Result Value Ref Range    BSA 2.84 m2    Ascending aorta 3.43 cm    STJ 2.46 cm    AV mean gradient 5.10 mmHg    Ao peak nicola 1.43 m/s    Ao VTI 24.82 cm    IVRT 0.09 msec    IVS 1.18 (A) 0.6 - 1.1 cm    LA size 5.85 cm    Left Atrium Major Axis 6.77 cm    Left Atrium Minor Axis 6.36 cm    LVIDD 6.97 (A) 3.5 - 6.0 cm    LVIDS 6.19 (A) 2.1 - 4.0 cm    LVOT diameter 2.40 cm    LVOT peak VTI 15.73 cm    PW 1.15 (A) 0.6 - 1.1 cm    MV Peak A Nicola 0.42 m/s    E wave decelartion time 146.03 msec    MV Peak E Nicola 1.18 m/s    RA Major Axis 5.39 cm    RA Width 4.82 cm    RVDD 5.41 cm    Sinus 3.73 cm    TR Max Nicola 2.44 m/s    TDI LATERAL 0.10     TDI SEPTAL 0.09     LA WIDTH 5.23 cm    Ao root annulus 3.42 cm    AORTIC VALVE CUSP SEPERATION 2.02 cm    PV PEAK VELOCITY 1.09 cm/s    PV peak gradient 4.71 mmHg    MV stenosis pressure 1/2 time 42.35 ms    AV peak gradient 8.22 mmHg    LV Diastolic Volume 253.21 mL     LV Systolic Volume 193.03 mL    LV LATERAL E/E' RATIO 11.80     LV SEPTAL E/E' RATIO 13.11     FS 11 %    QEF 23.77 %    LA volume 170.56 cm3    LV mass 388.10 g    Left Ventricle Relative Wall Thickness 0.33 cm    AV valve area 2.87 cm2    MV valve area p 1/2 method 5.19 cm2    E/A ratio 2.81     Mean e' 0.10     LVOT area 4.52 cm2    LVOT stroke volume 71.12 cm3    E/E' ratio 12.42     LV Systolic Volume Index 68.0 mL/m2    LV Diastolic Volume Index 89.16 mL/m2    LA Volume Index 60.1 mL/m2    LV Mass Index 136.7 g/m2    Triscuspid Valve Regurgitation Peak Gradient 23.81 mmHg    Right Atrial Pressure (from IVC) 8.0 mmHg    TV rest pulmonary artery pressure 31.81 mmHg     Assessment and Plan:     Brief HPI:     * Acute respiratory failure with hypoxia    Ventilator management per Pulmonary     Shock    Weaned off pressors  Antibiotics per primary     Acute on chronic combined systolic and diastolic congestive heart failure    Severe biventricular dysfunction  Previously had refused biventricular ICD  Will benefit from low-dose inotrope  Continue IV diuresis     Essential hypertension    All meds held with hypotension     Hyperlipidemia    Statin         VTE Risk Mitigation (From admission, onward)        Ordered     enoxaparin injection 40 mg  Daily      01/05/19 1036     IP VTE HIGH RISK PATIENT  Once      01/05/19 1037          Shailesh Eng MD  Cardiology  [facility]

## 2019-01-09 NOTE — ASSESSMENT & PLAN NOTE
--2/2 cardiogenic pulmonary edema and concurrent pneumonia. Unclear which is leading cause of failure to wean from vent. CXR has not changed much with diuresis, however patient is not significantly net negative this admission. Additionally, patient is still having significant, thick secretions which point towards pneumonia.   --appreciate cardiology assistance in optimization of HF regimen.  started on 1/8 at 2 mcg/kg/min   --slight increase in creatinine with diuresis over past 2 days. Will discuss further about diuretics    --de-escalate to cefepime monotherapy with planned 7 day course (tentative stop date 1/12)  --continue daily SBT's. Failed today on 5/5 with accessory muscle use, tachypnea, small volumes. Able to tolerate 10/5 for 30 mintues

## 2019-01-09 NOTE — CARE UPDATE
Pt remains in icu on vent settings prvc rr 16 vt 540 peep 8 fio2 30%.  All alarms are on and workingpt is without distress at this time.  Will continue to monitor.  Ambu bag at hob

## 2019-01-09 NOTE — HOSPITAL COURSE
1-7:  Intubated post respiratory failure  1-8:  Started on dobutamine infusion  1-9:  Failed SBT  1-11 Extubated. Denies CP or SOB  1-13 Denies CP or SOB, wants to go home

## 2019-01-10 PROBLEM — R57.9 SHOCK: Status: RESOLVED | Noted: 2019-01-07 | Resolved: 2019-01-10

## 2019-01-10 LAB
ALLENS TEST: ABNORMAL
ANION GAP SERPL CALC-SCNC: 14 MMOL/L
BACTERIA BLD CULT: NORMAL
BACTERIA BLD CULT: NORMAL
BASOPHILS # BLD AUTO: 0.05 K/UL
BASOPHILS NFR BLD: 0.5 %
BNP SERPL-MCNC: 103 PG/ML
BUN SERPL-MCNC: 24 MG/DL
CALCIUM SERPL-MCNC: 8.8 MG/DL
CHLORIDE SERPL-SCNC: 98 MMOL/L
CO2 SERPL-SCNC: 27 MMOL/L
CREAT SERPL-MCNC: 1.5 MG/DL
CREAT UR-MCNC: 205.2 MG/DL
DELSYS: ABNORMAL
DIFFERENTIAL METHOD: ABNORMAL
EOSINOPHIL # BLD AUTO: 0.3 K/UL
EOSINOPHIL NFR BLD: 2.9 %
EOSINOPHIL URNS QL WRIGHT STN: NORMAL
ERYTHROCYTE [DISTWIDTH] IN BLOOD BY AUTOMATED COUNT: 16.4 %
ERYTHROCYTE [SEDIMENTATION RATE] IN BLOOD BY WESTERGREN METHOD: 16 MM/H
EST. GFR  (AFRICAN AMERICAN): 56 ML/MIN/1.73 M^2
EST. GFR  (NON AFRICAN AMERICAN): 49 ML/MIN/1.73 M^2
FIO2: 40
GLUCOSE SERPL-MCNC: 101 MG/DL
HCO3 UR-SCNC: 30.9 MMOL/L (ref 24–28)
HCT VFR BLD AUTO: 34.5 %
HGB BLD-MCNC: 11.5 G/DL
LYMPHOCYTES # BLD AUTO: 1.2 K/UL
LYMPHOCYTES NFR BLD: 11 %
MAGNESIUM SERPL-MCNC: 2 MG/DL
MCH RBC QN AUTO: 27.8 PG
MCHC RBC AUTO-ENTMCNC: 33.3 G/DL
MCV RBC AUTO: 83 FL
MIN VOL: 8.7
MODE: ABNORMAL
MONOCYTES # BLD AUTO: 1.4 K/UL
MONOCYTES NFR BLD: 13.3 %
NEUTROPHILS # BLD AUTO: 7.8 K/UL
NEUTROPHILS NFR BLD: 72.3 %
PCO2 BLDA: 51 MMHG (ref 35–45)
PEEP: 8
PH SMN: 7.39 [PH] (ref 7.35–7.45)
PHOSPHATE SERPL-MCNC: 4.3 MG/DL
PIP: 27
PLATELET # BLD AUTO: 245 K/UL
PMV BLD AUTO: 11.3 FL
PO2 BLDA: 70 MMHG (ref 80–100)
POC BE: 5 MMOL/L
POC SATURATED O2: 93 % (ref 95–100)
POC TCO2: 32 MMOL/L (ref 23–27)
POTASSIUM SERPL-SCNC: 3.8 MMOL/L
PROCALCITONIN SERPL IA-MCNC: 0.18 NG/ML
RBC # BLD AUTO: 4.14 M/UL
SAMPLE: ABNORMAL
SITE: ABNORMAL
SODIUM SERPL-SCNC: 139 MMOL/L
SODIUM UR-SCNC: 26 MMOL/L
SP02: 96
UUN UR-MCNC: 1041 MG/DL
VT: 540
WBC # BLD AUTO: 10.77 K/UL

## 2019-01-10 PROCEDURE — 36600 WITHDRAWAL OF ARTERIAL BLOOD: CPT | Mod: HCNC

## 2019-01-10 PROCEDURE — 99291 PR CRITICAL CARE, E/M 30-74 MINUTES: ICD-10-PCS | Mod: HCNC,,, | Performed by: NURSE PRACTITIONER

## 2019-01-10 PROCEDURE — 99291 CRITICAL CARE FIRST HOUR: CPT | Mod: HCNC,,, | Performed by: NURSE PRACTITIONER

## 2019-01-10 PROCEDURE — 63600175 PHARM REV CODE 636 W HCPCS: Mod: HCNC | Performed by: EMERGENCY MEDICINE

## 2019-01-10 PROCEDURE — 63600175 PHARM REV CODE 636 W HCPCS: Mod: HCNC | Performed by: NURSE PRACTITIONER

## 2019-01-10 PROCEDURE — 99900035 HC TECH TIME PER 15 MIN (STAT): Mod: HCNC

## 2019-01-10 PROCEDURE — 85025 COMPLETE CBC W/AUTO DIFF WBC: CPT | Mod: HCNC

## 2019-01-10 PROCEDURE — 84100 ASSAY OF PHOSPHORUS: CPT | Mod: HCNC

## 2019-01-10 PROCEDURE — 83735 ASSAY OF MAGNESIUM: CPT | Mod: HCNC

## 2019-01-10 PROCEDURE — 87205 SMEAR GRAM STAIN: CPT | Mod: HCNC

## 2019-01-10 PROCEDURE — 94660 CPAP INITIATION&MGMT: CPT | Mod: HCNC

## 2019-01-10 PROCEDURE — 84540 ASSAY OF URINE/UREA-N: CPT | Mod: HCNC

## 2019-01-10 PROCEDURE — 99900026 HC AIRWAY MAINTENANCE (STAT): Mod: HCNC

## 2019-01-10 PROCEDURE — 25000003 PHARM REV CODE 250: Mod: HCNC | Performed by: NURSE PRACTITIONER

## 2019-01-10 PROCEDURE — 27000190 HC CPAP FULL FACE MASK W/VALVE: Mod: HCNC

## 2019-01-10 PROCEDURE — 94761 N-INVAS EAR/PLS OXIMETRY MLT: CPT | Mod: HCNC

## 2019-01-10 PROCEDURE — 83880 ASSAY OF NATRIURETIC PEPTIDE: CPT | Mod: HCNC

## 2019-01-10 PROCEDURE — 25000003 PHARM REV CODE 250: Mod: HCNC | Performed by: HOSPITALIST

## 2019-01-10 PROCEDURE — 99233 SBSQ HOSP IP/OBS HIGH 50: CPT | Mod: HCNC,,, | Performed by: INTERNAL MEDICINE

## 2019-01-10 PROCEDURE — 82803 BLOOD GASES ANY COMBINATION: CPT | Mod: HCNC

## 2019-01-10 PROCEDURE — 27000221 HC OXYGEN, UP TO 24 HOURS: Mod: HCNC

## 2019-01-10 PROCEDURE — 25000003 PHARM REV CODE 250: Mod: HCNC | Performed by: INTERNAL MEDICINE

## 2019-01-10 PROCEDURE — 25000003 PHARM REV CODE 250: Mod: HCNC | Performed by: EMERGENCY MEDICINE

## 2019-01-10 PROCEDURE — 99233 PR SUBSEQUENT HOSPITAL CARE,LEVL III: ICD-10-PCS | Mod: HCNC,,, | Performed by: INTERNAL MEDICINE

## 2019-01-10 PROCEDURE — 84300 ASSAY OF URINE SODIUM: CPT | Mod: HCNC

## 2019-01-10 PROCEDURE — 20000000 HC ICU ROOM: Mod: HCNC

## 2019-01-10 PROCEDURE — 36415 COLL VENOUS BLD VENIPUNCTURE: CPT | Mod: HCNC

## 2019-01-10 PROCEDURE — 80048 BASIC METABOLIC PNL TOTAL CA: CPT | Mod: HCNC

## 2019-01-10 PROCEDURE — 63600175 PHARM REV CODE 636 W HCPCS: Mod: HCNC | Performed by: INTERNAL MEDICINE

## 2019-01-10 PROCEDURE — 82570 ASSAY OF URINE CREATININE: CPT | Mod: HCNC

## 2019-01-10 RX ORDER — AMOXICILLIN 250 MG
1 CAPSULE ORAL DAILY PRN
Status: DISCONTINUED | OUTPATIENT
Start: 2019-01-10 | End: 2019-01-11

## 2019-01-10 RX ADMIN — FUROSEMIDE 40 MG: 10 INJECTION, SOLUTION INTRAVENOUS at 07:01

## 2019-01-10 RX ADMIN — DOCUSATE SODIUM AND SENNOSIDES 1 TABLET: 8.6; 5 TABLET, FILM COATED ORAL at 07:01

## 2019-01-10 RX ADMIN — ENOXAPARIN SODIUM 40 MG: 100 INJECTION SUBCUTANEOUS at 04:01

## 2019-01-10 RX ADMIN — FUROSEMIDE 40 MG: 10 INJECTION, SOLUTION INTRAVENOUS at 09:01

## 2019-01-10 RX ADMIN — Medication 225 MCG/HR: at 01:01

## 2019-01-10 RX ADMIN — PANTOPRAZOLE SODIUM 40 MG: 40 GRANULE, DELAYED RELEASE ORAL at 09:01

## 2019-01-10 RX ADMIN — CEFEPIME HYDROCHLORIDE 1 G: 1 INJECTION, SOLUTION INTRAVENOUS at 03:01

## 2019-01-10 RX ADMIN — CEFEPIME HYDROCHLORIDE 1 G: 1 INJECTION, SOLUTION INTRAVENOUS at 12:01

## 2019-01-10 RX ADMIN — PRAVASTATIN SODIUM 80 MG: 40 TABLET ORAL at 09:01

## 2019-01-10 RX ADMIN — CEFEPIME HYDROCHLORIDE 1 G: 1 INJECTION, SOLUTION INTRAVENOUS at 08:01

## 2019-01-10 RX ADMIN — ASPIRIN 325 MG ORAL TABLET 325 MG: 325 PILL ORAL at 09:01

## 2019-01-10 RX ADMIN — CHLORHEXIDINE GLUCONATE 0.12% ORAL RINSE 15 ML: 1.2 LIQUID ORAL at 09:01

## 2019-01-10 NOTE — PT/OT/SLP PROGRESS
Physical Therapy      Patient Name:  Papito Bhakat   MRN:  [id number]    Patient not seen today for PT eval secondary to Other (Pt just extubated today.). Will follow-up tomorrow as appropriate.  Thank you.      Sue Hebert, PT

## 2019-01-10 NOTE — SUBJECTIVE & OBJECTIVE
HPI:  Mr. Bhakta is a 62 yo male with HFrEF (EF 20%) and HTN who presented to the ED on 1/5/19 complaining of gradually worsening SOB with associated bilateral lower extremity swelling x1 week and productive cough. He is not on supplementary O2 at home. He reported compliance with his lasix 80mg x2/day. He denied fever, chills, diaphoresis, nausea, emesis, diarrhea, abdominal pain, chest pain. Patient was hypoxic upon arrival to 80%. He was started on BiPAP and diuresis as his CXR noted significant pulmonary edema.     Hospital Course:  Mr. Bhakta was admitted for respiratory failure thought to be due to CHF exacerbation. He was started on BiPAP and diuresed. He had no leukocytosis or fever at the time of presentation. He had worsening respiratory failure the night of 1/5 and required intubation. Upon intubation he was found to have a copious amount of thick, yellow sputum, almost appearing purulent. He had been started on broad spectrum antibiotics with cefepime and vancomycin. Respiratory culture grew gram positive cocci in pairs and chains and GNR's. Shock also developed on the night of 1/5, suspected to be cardiogenic vs septic, so a central line was placed and levophed started. He developed a fever on 1/6 (102 F), antibiotics continued. Shock resolved on 1/8 and he has failed SBT's due to tachypnea, high work of breathing despite diuresis since 1/7. Cardiology consulted on 1/8 to help with optimization of HF regimen as repeat 2D echo noted worsening of EF to 10%.  Dobutamine 2 mcg/kg/min stated on 1/8 and diuresis continued.     Interval History/Significant Events: 2L urine output over past day, net negative 1.2 L over past day. Afebrile. Remains on  at 2 mcg/kg/min    Objective:     Vital Signs (Most Recent):  Temp: 98.7 °F (37.1 °C) (01/10/19 0730)  Pulse: 89 (01/10/19 0830)  Resp: 13 (01/10/19 0830)  BP: 129/69 (01/10/19 0830)  SpO2: 97 % (01/10/19 0830) Vital Signs (24h Range):  Temp:  [98.1 °F (36.7  °C)-98.9 °F (37.2 °C)] 98.7 °F (37.1 °C)  Pulse:  [] 89  Resp:  [11-43] 13  SpO2:  [94 %-100 %] 97 %  BP: (102-135)/(57-71) 129/69     Weight: (!) 141.1 kg (311 lb)  Body mass index is 36.88 kg/m².      Intake/Output Summary (Last 24 hours) at 1/10/2019 1112  Last data filed at 1/10/2019 0600  Gross per 24 hour   Intake 612.16 ml   Output 1829 ml   Net -1216.84 ml       Physical Exam   Constitutional: He appears well-developed. He does not have a sickly appearance. No distress. He is intubated.   HENT:   Head: Normocephalic and atraumatic.   Eyes: Conjunctivae are normal. Pupils are equal, round, and reactive to light. Right eye exhibits no discharge. Left eye exhibits no discharge. No scleral icterus.   Neck: Normal range of motion. Neck supple. JVD: unable to assess; TLC in place. No tracheal deviation present. No thyromegaly present.   Cardiovascular: Normal rate, regular rhythm, S1 normal and S2 normal.   Pulses:       Radial pulses are 2+ on the right side, and 2+ on the left side.        Dorsalis pedis pulses are 1+ on the right side, and 1+ on the left side.   LE's warm. 1+ DP pulses palpated bilaterally    Pulmonary/Chest: No accessory muscle usage. He is intubated. No respiratory distress. He has no decreased breath sounds. He has no wheezes. He has rales in the right middle field and the left middle field.   Abdominal: Soft. Bowel sounds are normal. He exhibits no distension. There is no tenderness. There is no guarding.   Lymphadenopathy:     He has no cervical adenopathy.   Neurological: GCS eye subscore is 4. GCS verbal subscore is 1. GCS motor subscore is 6.   RASS 0, alert, interactive, able to follow commands consistently. Moves all extremities when prompted with good strength.    Skin: Skin is dry. He is not diaphoretic. There is pallor.       Vents:  Vent Mode: PRVC (01/10/19 0930)  Ventilator Initiated: Yes (01/05/19 1945)  Set Rate: 16 bmp (01/10/19 0930)  Vt Set: 540 mL (01/10/19  0930)  Pressure Support: 10 cmH20 (01/09/19 1005)  PEEP/CPAP: 8 cmH20 (01/10/19 0930)  Oxygen Concentration (%): 40 (01/10/19 0930)  Peak Airway Pressure: 24.3 cmH2O (01/10/19 0930)  Total Ve: 11.5 mL (01/10/19 0930)  F/VT Ratio<105 (RSBI): (!) 32.48 (01/10/19 0730)    Lines/Drains/Airways     Central Venous Catheter Line                 Tunneled Central Line Insertion/Assessment - Triple Lumen  01/05/19 2130 right internal jugular 4 days          Drain                 NG/OG Tube 01/05/19 2200 orogastric 12 Fr. Left mouth 4 days         Urethral Catheter 01/05/19 1745 4 days          Airway                 Airway - Non-Surgical 01/05/19 1945 Endotracheal Tube 4 days                Significant Labs:    CBC/Anemia Profile:  Recent Labs   Lab 01/09/19  0409 01/10/19  0315   WBC 8.71 10.77   HGB 11.2* 11.5*   HCT 31.9* 34.5*    245   MCV 85 83   RDW 16.2* 16.4*        Chemistries:  Recent Labs   Lab 01/08/19  1144 01/09/19  0409 01/10/19  0315 01/10/19  0316    138  --  139   K 4.1 3.5  --  3.8   CL 99 98  --  98   CO2 29 30*  --  27   BUN 14 21  --  24*   CREATININE 1.1 1.4  --  1.5*   CALCIUM 8.6* 8.7  --  8.8   MG 2.1 1.9 2.0  --    PHOS  --  4.2 4.3  --        Significant Imaging:  I have reviewed all pertinent imaging results/findings within the past 24 hours.

## 2019-01-10 NOTE — PROGRESS NOTES
Results for COURT BUSTOS (MRN [id number]) as of 1/10/2019 06:53   Ref. Range 1/10/2019 04:56   POC PH Latest Ref Range: 7.35 - 7.45  7.390   POC PCO2 Latest Ref Range: 35 - 45 mmHg 51.0 (H)   POC PO2 Latest Ref Range: 80 - 100 mmHg 70 (L)   POC BE Latest Ref Range: -2 to 2 mmol/L 5   POC HCO3 Latest Ref Range: 24 - 28 mmol/L 30.9 (H)   POC SATURATED O2 Latest Ref Range: 95 - 100 % 93 (L)   POC TCO2 Latest Ref Range: 23 - 27 mmol/L 32 (H)   FiO2 Unknown 40   Vt Unknown 540   PiP Unknown 27   PEEP Unknown 8   Sample Unknown ARTERIAL   DelSys Unknown Adult Vent   Allens Test Unknown Pass   Site Unknown RR   Mode Unknown AC/PRVC   Rate Unknown 16

## 2019-01-10 NOTE — ASSESSMENT & PLAN NOTE
Intubated for respiratory failure due to CHF exacerbation and CAP.   Pulm/CC consulted. No wheezes on exam. Broad spectrum abx.   Resp stain/Cx with resp yung but no bacterial growth on culture. BCx NGTD. Diurese with IV Lasix. SBT daily.  Appreciate Pulmonary input.  Doing better, but still requiring vent.  Hopefully extubate today.

## 2019-01-10 NOTE — PROGRESS NOTES
"[facility] Medicine  Progress Note    Patient Name: Papito Bhakta  MRN: [id number]  Patient Class: IP- Inpatient   Admission Date: 1/5/2019  Length of Stay: 5 days  Attending Physician: Jayden Gray MD  Primary Care Provider: Brynn To MD        Subjective:     Principal Problem:Acute respiratory failure with hypoxia    HPI:  Mr. Bhakta is a 62 yo man with combined HF (EF 20%, G3DD), AICD in place, Hyperlipidemia, and Hypertension who presented to the ED c/o gradually worsening and severe (10/10) SOB with associated bilateral lower extremity swelling x1 week. He also reports a hematemesis and productive cough with sputum described as "like grits." He is not on supplementary O2 at home. He is compliant with his Lasix 80mg x2/day. He denies fever, chills, diaphoresis, nausea, emesis, diarrhea, abdominal pain, chest pain, back pain, difficulty urinating and rash. Patient was hypoxic upon arrival to 80%. He was started on supplemental oxygen via BiPAP. CXR w/ acute pulmonary edema. He was started on IV Lasix. He claims to be complaint with a low salt diet and Lasix.    Hospital Course:  Mr. Bhakta was admitted for respiratory failure thought to be due to CHF exacerbation. He was started on BiPAP and diuresis. He had no leukocytosis or fever at the time of presentation. CXR was consistent with pulmonary edema without evidence of consolidation on presentation. He had worsening respiratory failure the night following admission and required intubation. Upon intubation he was found to have a copious amount of thick, yellow sputum that was almost appearing purulent. He had been started on broad spectrum antibiotics with cefepime and vancomycin. Resp stain with resp yung but NGTD. Blood cultures remain NGTD.  Episodes of Vtach and Cardiology consulted.  Started on Dobutamine per Cards.    Interval History: No new issues overnight.    Review of Systems   Unable to perform ROS: Intubated "     Objective:     Vital Signs (Most Recent):  Temp: 98.6 °F (37 °C) (01/10/19 1130)  Pulse: 83 (01/10/19 1130)  Resp: (!) 25 (01/10/19 1130)  BP: 129/70 (01/10/19 1130)  SpO2: 98 % (01/10/19 1130) Vital Signs (24h Range):  Temp:  [98.1 °F (36.7 °C)-98.7 °F (37.1 °C)] 98.6 °F (37 °C)  Pulse:  [72-92] 83  Resp:  [6-43] 25  SpO2:  [94 %-100 %] 98 %  BP: (102-135)/(57-71) 129/70     Weight: (!) 141.1 kg (311 lb)  Body mass index is 36.88 kg/m².    Intake/Output Summary (Last 24 hours) at 1/10/2019 1359  Last data filed at 1/10/2019 0600  Gross per 24 hour   Intake 612.16 ml   Output 1719 ml   Net -1106.84 ml      Physical Exam   Constitutional: He appears well-developed and well-nourished.   HENT:   Right Ear: External ear normal.   Left Ear: External ear normal.   Nose: Nose normal.   ET/OG tubes in place   Eyes: Conjunctivae are normal. Right eye exhibits no discharge.   Cardiovascular: Normal rate and normal heart sounds.   No murmur heard.  Pulmonary/Chest: He has no wheezes. He has rales.   Mechanically ventilated. Coarse particularly over right base, less so than previously   Abdominal: Soft. Bowel sounds are normal. He exhibits no distension. There is no tenderness.   Musculoskeletal: He exhibits edema (trace BLE ). He exhibits no deformity.   Neurological:   Alert, follows commands and nod appropriately   Skin: Skin is warm and dry.       Significant Labs:   BMP:   Recent Labs   Lab 01/10/19  0315 01/10/19  0316   GLU  --  101   NA  --  139   K  --  3.8   CL  --  98   CO2  --  27   BUN  --  24*   CREATININE  --  1.5*   CALCIUM  --  8.8   MG 2.0  --      CBC:   Recent Labs   Lab 01/09/19  0409 01/10/19  0315   WBC 8.71 10.77   HGB 11.2* 11.5*   HCT 31.9* 34.5*    245       Significant Imaging: I have reviewed all pertinent imaging results/findings within the past 24 hours.    Assessment/Plan:      * Acute respiratory failure with hypoxia    Intubated for respiratory failure due to CHF exacerbation and  CAP.   Pulm/CC consulted. No wheezes on exam. Broad spectrum abx.   Resp stain/Cx with resp yung but no bacterial growth on culture. BCx NGTD. Diurese with IV Lasix. SBT daily.  Appreciate Pulmonary input.  Doing better, but still requiring vent.  Hopefully extubate today.     CAP (community acquired pneumonia)    Empiric abx with Vanc/Cefepime. Resp Cx polymicrobial. Follow cultures.     Encephalopathy, metabolic    Agitated and required sedation as he was reaching for ET tube. 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 Weaned sedation with improvement in mental status on 1/7/2019.     Hyperlipidemia    On statin.     Acute pulmonary edema    Diurese with IV Lasix. Also component of pneumonia. BNP is more elevated than previously. Monitor UOP closely.     Acute on chronic combined systolic and diastolic congestive heart failure    EF of 20% and grade 3 diastolic CHF on echo on 11/2018. Diurese with IV Lasix. Hold BB/Entresto while hypotensive.  Started on Dobutamine per Cards.  Creat increasing.  May need to cut back on diuresis.     BMI 40.0-44.9, adult    Weight lose as out patient.     Essential hypertension    Hold home Entresto and Coreg for shock requiring Levophed. Given IV Lasix for diuresis.  Able to wean off Levophed.         VTE Risk Mitigation (From admission, onward)        Ordered     enoxaparin injection 40 mg  Daily      01/05/19 1036     IP VTE HIGH RISK PATIENT  Once      01/05/19 1037          Critical care time spent on the evaluation and treatment of severe organ dysfunction, review of pertinent labs and imaging studies, discussions with consulting providers and discussions with patient/family: 40 minutes.    Jayden Gray MD  Department of Hospital Medicine   [facility]

## 2019-01-10 NOTE — PROGRESS NOTES
Patient returned to room _271___ from transport.  Patient placed back on ventilator on previous settings.  Ventilator and alarms on and functioning.  AMBU bag and mask at bedside.  Nurse notified

## 2019-01-10 NOTE — PROGRESS NOTES
[facility]  Pulmonology  Progress Note    Patient Name: Papito Bhakta  MRN: [id number]  Admission Date: 1/5/2019  Hospital Length of Stay: 5 days  Code Status: Full Code  Attending Provider: Jayden Gray MD  Primary Care Provider: Brynn To MD   Principal Problem: Acute respiratory failure with hypoxia    Subjective:     HPI:  Mr. Bhakta is a 62 yo male with HFrEF (EF 20%) and HTN who presented to the ED on 1/5/19 complaining of gradually worsening SOB with associated bilateral lower extremity swelling x1 week and productive cough. He is not on supplementary O2 at home. He reported compliance with his lasix 80mg x2/day. He denied fever, chills, diaphoresis, nausea, emesis, diarrhea, abdominal pain, chest pain. Patient was hypoxic upon arrival to 80%. He was started on BiPAP and diuresis as his CXR noted significant pulmonary edema.     Hospital Course:  Mr. Bhakta was admitted for respiratory failure thought to be due to CHF exacerbation. He was started on BiPAP and diuresed. He had no leukocytosis or fever at the time of presentation. He had worsening respiratory failure the night of 1/5 and required intubation. Upon intubation he was found to have a copious amount of thick, yellow sputum, almost appearing purulent. He had been started on broad spectrum antibiotics with cefepime and vancomycin. Respiratory culture grew gram positive cocci in pairs and chains and GNR's. Shock also developed on the night of 1/5, suspected to be cardiogenic vs septic, so a central line was placed and levophed started. He developed a fever on 1/6 (102 F), antibiotics continued. Shock resolved on 1/8 and he has failed SBT's due to tachypnea, high work of breathing despite diuresis since 1/7. Cardiology consulted on 1/8 to help with optimization of HF regimen as repeat 2D echo noted worsening of EF to 10%.  Dobutamine 2 mcg/kg/min stated on 1/8 and diuresis continued.     Interval History/Significant  Events: 2L urine output over past day, net negative 1.2 L over past day. Afebrile. Remains on  at 2 mcg/kg/min    Objective:     Vital Signs (Most Recent):  Temp: 98.7 °F (37.1 °C) (01/10/19 0730)  Pulse: 89 (01/10/19 0830)  Resp: 13 (01/10/19 0830)  BP: 129/69 (01/10/19 0830)  SpO2: 97 % (01/10/19 0830) Vital Signs (24h Range):  Temp:  [98.1 °F (36.7 °C)-98.9 °F (37.2 °C)] 98.7 °F (37.1 °C)  Pulse:  [] 89  Resp:  [11-43] 13  SpO2:  [94 %-100 %] 97 %  BP: (102-135)/(57-71) 129/69     Weight: (!) 141.1 kg (311 lb)  Body mass index is 36.88 kg/m².      Intake/Output Summary (Last 24 hours) at 1/10/2019 1112  Last data filed at 1/10/2019 0600  Gross per 24 hour   Intake 612.16 ml   Output 1829 ml   Net -1216.84 ml       Physical Exam   Constitutional: He appears well-developed. He does not have a sickly appearance. No distress. He is intubated.   HENT:   Head: Normocephalic and atraumatic.   Eyes: Conjunctivae are normal. Pupils are equal, round, and reactive to light. Right eye exhibits no discharge. Left eye exhibits no discharge. No scleral icterus.   Neck: Normal range of motion. Neck supple. JVD: unable to assess; TLC in place. No tracheal deviation present. No thyromegaly present.   Cardiovascular: Normal rate, regular rhythm, S1 normal and S2 normal.   Pulses:       Radial pulses are 2+ on the right side, and 2+ on the left side.        Dorsalis pedis pulses are 1+ on the right side, and 1+ on the left side.   LE's warm. 1+ DP pulses palpated bilaterally    Pulmonary/Chest: No accessory muscle usage. He is intubated. No respiratory distress. He has no decreased breath sounds. He has no wheezes. He has rales in the right middle field and the left middle field.   Abdominal: Soft. Bowel sounds are normal. He exhibits no distension. There is no tenderness. There is no guarding.   Lymphadenopathy:     He has no cervical adenopathy.   Neurological: GCS eye subscore is 4. GCS verbal subscore is 1. GCS motor  subscore is 6.   RASS 0, alert, interactive, able to follow commands consistently. Moves all extremities when prompted with good strength.    Skin: Skin is dry. He is not diaphoretic. There is pallor.       Vents:  Vent Mode: PRVC (01/10/19 0930)  Ventilator Initiated: Yes (01/05/19 1945)  Set Rate: 16 bmp (01/10/19 0930)  Vt Set: 540 mL (01/10/19 0930)  Pressure Support: 10 cmH20 (01/09/19 1005)  PEEP/CPAP: 8 cmH20 (01/10/19 0930)  Oxygen Concentration (%): 40 (01/10/19 0930)  Peak Airway Pressure: 24.3 cmH2O (01/10/19 0930)  Total Ve: 11.5 mL (01/10/19 0930)  F/VT Ratio<105 (RSBI): (!) 32.48 (01/10/19 0730)    Lines/Drains/Airways     Central Venous Catheter Line                 Tunneled Central Line Insertion/Assessment - Triple Lumen  01/05/19 2130 right internal jugular 4 days          Drain                 NG/OG Tube 01/05/19 2200 orogastric 12 Fr. Left mouth 4 days         Urethral Catheter 01/05/19 1745 4 days          Airway                 Airway - Non-Surgical 01/05/19 1945 Endotracheal Tube 4 days                Significant Labs:    CBC/Anemia Profile:  Recent Labs   Lab 01/09/19  0409 01/10/19  0315   WBC 8.71 10.77   HGB 11.2* 11.5*   HCT 31.9* 34.5*    245   MCV 85 83   RDW 16.2* 16.4*        Chemistries:  Recent Labs   Lab 01/08/19  1144 01/09/19  0409 01/10/19  0315 01/10/19  0316    138  --  139   K 4.1 3.5  --  3.8   CL 99 98  --  98   CO2 29 30*  --  27   BUN 14 21  --  24*   CREATININE 1.1 1.4  --  1.5*   CALCIUM 8.6* 8.7  --  8.8   MG 2.1 1.9 2.0  --    PHOS  --  4.2 4.3  --        Significant Imaging:  I have reviewed all pertinent imaging results/findings within the past 24 hours.    Assessment/Plan:     * Acute respiratory failure with hypoxia    --2/2 cardiogenic pulmonary edema and concurrent pneumonia. Unclear which is leading cause of failure to wean from vent. CXR has not changed much with diuresis, however patient is not significantly net negative this admission.  Additionally, patient is still having significant, thick secretions which point towards pneumonia.   --appreciate cardiology assistance in optimization of HF regimen.  started on 1/8 at 2 mcg/kg/min and continued  --slight increase in creatinine with diuresis over past 2 days, however urine output adequate. Continue for now and trend  --continue cefepime monotherapy with planned 7 day course (tentative stop date 1/12)  --SBT today, and likely extubation to bipap if does well.      Acute on chronic combined systolic and diastolic congestive heart failure    --appreciate cardiology assistance  --continue  per cardiology, continue lasix            Above discussed with Dr. Vazquez.    Critical care time: 50 minutes       Carly Reynolds NP  Pulmonology  [facility]

## 2019-01-10 NOTE — PLAN OF CARE
Problem: Adult Inpatient Plan of Care  Goal: Plan of Care Review  Outcome: Ongoing (interventions implemented as appropriate)  Patient remains in ICU on vent PRVC , FIO2 40%, rate 16,  and peep 8. Continues to have large amounts of thick yellow sputum from in-line suction. Is awake, alert and following commands on Fentanyl at 175mcg. Patient continues on dobutamine at 2mcg. TLC to right jugular clean and dry. Patient has bilateral soft wrist restraints and shakes head yes when asked if he understands the reason for them. Mcmahon with adequate amount cloudy yellow urine.

## 2019-01-10 NOTE — PROGRESS NOTES
Pt extubated to BIPAP 12/5 and 40% with NARN. Will monitor pt status and wean as tolerated. O2 saturation 97%.

## 2019-01-10 NOTE — PROGRESS NOTES
Pt received on Servo I vent with settings as followed: PRVC 16/540/+8 and 40%. Size 7.5 ETT in place and secure at 24 cm at the lip. Ambu bag and mask at bedside and all alarms on and functioning. NARN. RT will continue to monitor patient status.

## 2019-01-10 NOTE — SUBJECTIVE & OBJECTIVE
Interval History: No new issues overnight.    Review of Systems   Unable to perform ROS: Intubated     Objective:     Vital Signs (Most Recent):  Temp: 98.6 °F (37 °C) (01/10/19 1130)  Pulse: 83 (01/10/19 1130)  Resp: (!) 25 (01/10/19 1130)  BP: 129/70 (01/10/19 1130)  SpO2: 98 % (01/10/19 1130) Vital Signs (24h Range):  Temp:  [98.1 °F (36.7 °C)-98.7 °F (37.1 °C)] 98.6 °F (37 °C)  Pulse:  [72-92] 83  Resp:  [6-43] 25  SpO2:  [94 %-100 %] 98 %  BP: (102-135)/(57-71) 129/70     Weight: (!) 141.1 kg (311 lb)  Body mass index is 36.88 kg/m².    Intake/Output Summary (Last 24 hours) at 1/10/2019 1359  Last data filed at 1/10/2019 0600  Gross per 24 hour   Intake 612.16 ml   Output 1719 ml   Net -1106.84 ml      Physical Exam   Constitutional: He appears well-developed and well-nourished.   HENT:   Right Ear: External ear normal.   Left Ear: External ear normal.   Nose: Nose normal.   ET/OG tubes in place   Eyes: Conjunctivae are normal. Right eye exhibits no discharge.   Cardiovascular: Normal rate and normal heart sounds.   No murmur heard.  Pulmonary/Chest: He has no wheezes. He has rales.   Mechanically ventilated. Coarse particularly over right base, less so than previously   Abdominal: Soft. Bowel sounds are normal. He exhibits no distension. There is no tenderness.   Musculoskeletal: He exhibits edema (trace BLE ). He exhibits no deformity.   Neurological:   Alert, follows commands and nod appropriately   Skin: Skin is warm and dry.       Significant Labs:   BMP:   Recent Labs   Lab 01/10/19  0315 01/10/19  0316   GLU  --  101   NA  --  139   K  --  3.8   CL  --  98   CO2  --  27   BUN  --  24*   CREATININE  --  1.5*   CALCIUM  --  8.8   MG 2.0  --      CBC:   Recent Labs   Lab 01/09/19  0409 01/10/19  0315   WBC 8.71 10.77   HGB 11.2* 11.5*   HCT 31.9* 34.5*    245       Significant Imaging: I have reviewed all pertinent imaging results/findings within the past 24 hours.

## 2019-01-10 NOTE — ASSESSMENT & PLAN NOTE
--2/2 cardiogenic pulmonary edema and concurrent pneumonia. Unclear which is leading cause of failure to wean from vent. CXR has not changed much with diuresis, however patient is not significantly net negative this admission. Additionally, patient is still having significant, thick secretions which point towards pneumonia.   --appreciate cardiology assistance in optimization of HF regimen.  started on 1/8 at 2 mcg/kg/min and continued  --slight increase in creatinine with diuresis over past 2 days, however urine output adequate. Continue for now and trend  --continue cefepime monotherapy with planned 7 day course (tentative stop date 1/12)  --SBT today, and likely extubation to bipap if does well.

## 2019-01-11 LAB
ANION GAP SERPL CALC-SCNC: 10 MMOL/L
BASOPHILS # BLD AUTO: 0.03 K/UL
BASOPHILS NFR BLD: 0.4 %
BUN SERPL-MCNC: 25 MG/DL
CALCIUM SERPL-MCNC: 8.8 MG/DL
CHLORIDE SERPL-SCNC: 100 MMOL/L
CO2 SERPL-SCNC: 31 MMOL/L
CREAT SERPL-MCNC: 1.5 MG/DL
DIFFERENTIAL METHOD: ABNORMAL
EOSINOPHIL # BLD AUTO: 0.1 K/UL
EOSINOPHIL NFR BLD: 1.8 %
ERYTHROCYTE [DISTWIDTH] IN BLOOD BY AUTOMATED COUNT: 15.1 %
EST. GFR  (AFRICAN AMERICAN): 56 ML/MIN/1.73 M^2
EST. GFR  (NON AFRICAN AMERICAN): 49 ML/MIN/1.73 M^2
GLUCOSE SERPL-MCNC: 93 MG/DL
HCT VFR BLD AUTO: 44.3 %
HGB BLD-MCNC: 14.2 G/DL
LYMPHOCYTES # BLD AUTO: 0.9 K/UL
LYMPHOCYTES NFR BLD: 11.5 %
MAGNESIUM SERPL-MCNC: 2.2 MG/DL
MCH RBC QN AUTO: 25.1 PG
MCHC RBC AUTO-ENTMCNC: 32.1 G/DL
MCV RBC AUTO: 78 FL
MONOCYTES # BLD AUTO: 1.1 K/UL
MONOCYTES NFR BLD: 14.4 %
NEUTROPHILS # BLD AUTO: 5.6 K/UL
NEUTROPHILS NFR BLD: 71.9 %
PHOSPHATE SERPL-MCNC: 3.4 MG/DL
PLATELET # BLD AUTO: 218 K/UL
PMV BLD AUTO: 10.8 FL
POTASSIUM SERPL-SCNC: 3.5 MMOL/L
RBC # BLD AUTO: 5.66 M/UL
SODIUM SERPL-SCNC: 141 MMOL/L
WBC # BLD AUTO: 7.76 K/UL

## 2019-01-11 PROCEDURE — 25000003 PHARM REV CODE 250: Mod: HCNC | Performed by: HOSPITALIST

## 2019-01-11 PROCEDURE — 97162 PT EVAL MOD COMPLEX 30 MIN: CPT | Mod: HCNC

## 2019-01-11 PROCEDURE — 27000221 HC OXYGEN, UP TO 24 HOURS: Mod: HCNC

## 2019-01-11 PROCEDURE — G8987 SELF CARE CURRENT STATUS: HCPCS | Mod: CK,HCNC

## 2019-01-11 PROCEDURE — 94640 AIRWAY INHALATION TREATMENT: CPT | Mod: HCNC

## 2019-01-11 PROCEDURE — 25000003 PHARM REV CODE 250: Mod: HCNC | Performed by: NURSE PRACTITIONER

## 2019-01-11 PROCEDURE — 94761 N-INVAS EAR/PLS OXIMETRY MLT: CPT | Mod: HCNC

## 2019-01-11 PROCEDURE — 99233 PR SUBSEQUENT HOSPITAL CARE,LEVL III: ICD-10-PCS | Mod: HCNC,,, | Performed by: EMERGENCY MEDICINE

## 2019-01-11 PROCEDURE — 25000003 PHARM REV CODE 250: Mod: HCNC | Performed by: INTERNAL MEDICINE

## 2019-01-11 PROCEDURE — G8978 MOBILITY CURRENT STATUS: HCPCS | Mod: CK,HCNC

## 2019-01-11 PROCEDURE — 63600175 PHARM REV CODE 636 W HCPCS: Mod: HCNC | Performed by: NURSE PRACTITIONER

## 2019-01-11 PROCEDURE — 94660 CPAP INITIATION&MGMT: CPT | Mod: HCNC

## 2019-01-11 PROCEDURE — 99233 SBSQ HOSP IP/OBS HIGH 50: CPT | Mod: GT,HCNC,, | Performed by: INTERNAL MEDICINE

## 2019-01-11 PROCEDURE — 25000242 PHARM REV CODE 250 ALT 637 W/ HCPCS: Mod: HCNC | Performed by: EMERGENCY MEDICINE

## 2019-01-11 PROCEDURE — 63600175 PHARM REV CODE 636 W HCPCS: Mod: HCNC | Performed by: EMERGENCY MEDICINE

## 2019-01-11 PROCEDURE — G8979 MOBILITY GOAL STATUS: HCPCS | Mod: CI,HCNC

## 2019-01-11 PROCEDURE — 84100 ASSAY OF PHOSPHORUS: CPT | Mod: HCNC

## 2019-01-11 PROCEDURE — 63600175 PHARM REV CODE 636 W HCPCS: Mod: HCNC | Performed by: INTERNAL MEDICINE

## 2019-01-11 PROCEDURE — 83735 ASSAY OF MAGNESIUM: CPT | Mod: HCNC

## 2019-01-11 PROCEDURE — 99233 PR SUBSEQUENT HOSPITAL CARE,LEVL III: ICD-10-PCS | Mod: GT,HCNC,, | Performed by: INTERNAL MEDICINE

## 2019-01-11 PROCEDURE — 85025 COMPLETE CBC W/AUTO DIFF WBC: CPT | Mod: HCNC

## 2019-01-11 PROCEDURE — 97165 OT EVAL LOW COMPLEX 30 MIN: CPT | Mod: HCNC

## 2019-01-11 PROCEDURE — 80048 BASIC METABOLIC PNL TOTAL CA: CPT | Mod: HCNC

## 2019-01-11 PROCEDURE — 21400001 HC TELEMETRY ROOM: Mod: HCNC

## 2019-01-11 PROCEDURE — 99900035 HC TECH TIME PER 15 MIN (STAT): Mod: HCNC

## 2019-01-11 PROCEDURE — 25000242 PHARM REV CODE 250 ALT 637 W/ HCPCS: Mod: HCNC | Performed by: HOSPITALIST

## 2019-01-11 PROCEDURE — 36415 COLL VENOUS BLD VENIPUNCTURE: CPT | Mod: HCNC

## 2019-01-11 PROCEDURE — 99233 SBSQ HOSP IP/OBS HIGH 50: CPT | Mod: HCNC,,, | Performed by: EMERGENCY MEDICINE

## 2019-01-11 PROCEDURE — 25000003 PHARM REV CODE 250: Mod: HCNC | Performed by: EMERGENCY MEDICINE

## 2019-01-11 PROCEDURE — G8988 SELF CARE GOAL STATUS: HCPCS | Mod: CJ,HCNC

## 2019-01-11 RX ORDER — IPRATROPIUM BROMIDE AND ALBUTEROL SULFATE 2.5; .5 MG/3ML; MG/3ML
3 SOLUTION RESPIRATORY (INHALATION) EVERY 6 HOURS
Status: DISCONTINUED | OUTPATIENT
Start: 2019-01-11 | End: 2019-01-11

## 2019-01-11 RX ORDER — POLYETHYLENE GLYCOL 3350 17 G/17G
17 POWDER, FOR SOLUTION ORAL 2 TIMES DAILY PRN
Status: DISCONTINUED | OUTPATIENT
Start: 2019-01-11 | End: 2019-01-13 | Stop reason: HOSPADM

## 2019-01-11 RX ORDER — LISINOPRIL 5 MG/1
10 TABLET ORAL DAILY
Status: DISCONTINUED | OUTPATIENT
Start: 2019-01-11 | End: 2019-01-12

## 2019-01-11 RX ORDER — ACETAMINOPHEN 325 MG/1
650 TABLET ORAL EVERY 4 HOURS PRN
Status: DISCONTINUED | OUTPATIENT
Start: 2019-01-11 | End: 2019-01-13 | Stop reason: HOSPADM

## 2019-01-11 RX ORDER — SPIRONOLACTONE 25 MG/1
25 TABLET ORAL DAILY
Status: DISCONTINUED | OUTPATIENT
Start: 2019-01-11 | End: 2019-01-12

## 2019-01-11 RX ORDER — IPRATROPIUM BROMIDE AND ALBUTEROL SULFATE 2.5; .5 MG/3ML; MG/3ML
3 SOLUTION RESPIRATORY (INHALATION)
Status: DISCONTINUED | OUTPATIENT
Start: 2019-01-11 | End: 2019-01-13 | Stop reason: HOSPADM

## 2019-01-11 RX ORDER — COLCHICINE 0.6 MG/1
0.6 TABLET, FILM COATED ORAL DAILY
Status: DISCONTINUED | OUTPATIENT
Start: 2019-01-11 | End: 2019-01-13 | Stop reason: HOSPADM

## 2019-01-11 RX ORDER — ONDANSETRON 2 MG/ML
8 INJECTION INTRAMUSCULAR; INTRAVENOUS EVERY 6 HOURS PRN
Status: DISCONTINUED | OUTPATIENT
Start: 2019-01-11 | End: 2019-01-13 | Stop reason: HOSPADM

## 2019-01-11 RX ORDER — FUROSEMIDE 40 MG/1
40 TABLET ORAL 2 TIMES DAILY
Status: DISCONTINUED | OUTPATIENT
Start: 2019-01-11 | End: 2019-01-12

## 2019-01-11 RX ORDER — AMOXICILLIN 250 MG
1 CAPSULE ORAL 2 TIMES DAILY
Status: DISCONTINUED | OUTPATIENT
Start: 2019-01-11 | End: 2019-01-13 | Stop reason: HOSPADM

## 2019-01-11 RX ORDER — OXYCODONE AND ACETAMINOPHEN 5; 325 MG/1; MG/1
1 TABLET ORAL EVERY 4 HOURS PRN
Status: DISCONTINUED | OUTPATIENT
Start: 2019-01-11 | End: 2019-01-13 | Stop reason: HOSPADM

## 2019-01-11 RX ADMIN — ASPIRIN 325 MG ORAL TABLET 325 MG: 325 PILL ORAL at 08:01

## 2019-01-11 RX ADMIN — IPRATROPIUM BROMIDE AND ALBUTEROL SULFATE 3 ML: .5; 3 SOLUTION RESPIRATORY (INHALATION) at 10:01

## 2019-01-11 RX ADMIN — COLCHICINE 0.6 MG: 0.6 TABLET, FILM COATED ORAL at 06:01

## 2019-01-11 RX ADMIN — ENOXAPARIN SODIUM 40 MG: 100 INJECTION SUBCUTANEOUS at 06:01

## 2019-01-11 RX ADMIN — CEFEPIME HYDROCHLORIDE 1 G: 1 INJECTION, SOLUTION INTRAVENOUS at 11:01

## 2019-01-11 RX ADMIN — CEFEPIME HYDROCHLORIDE 1 G: 1 INJECTION, SOLUTION INTRAVENOUS at 04:01

## 2019-01-11 RX ADMIN — PRAVASTATIN SODIUM 80 MG: 40 TABLET ORAL at 08:01

## 2019-01-11 RX ADMIN — DOCUSATE SODIUM AND SENNOSIDES 1 TABLET: 8.6; 5 TABLET, FILM COATED ORAL at 09:01

## 2019-01-11 RX ADMIN — LISINOPRIL 10 MG: 5 TABLET ORAL at 08:01

## 2019-01-11 RX ADMIN — CEFEPIME HYDROCHLORIDE 1 G: 1 INJECTION, SOLUTION INTRAVENOUS at 09:01

## 2019-01-11 RX ADMIN — FUROSEMIDE 40 MG: 40 TABLET ORAL at 06:01

## 2019-01-11 RX ADMIN — FUROSEMIDE 40 MG: 40 TABLET ORAL at 08:01

## 2019-01-11 RX ADMIN — SPIRONOLACTONE 25 MG: 25 TABLET ORAL at 08:01

## 2019-01-11 RX ADMIN — DOBUTAMINE IN DEXTROSE 2 MCG/KG/MIN: 200 INJECTION, SOLUTION INTRAVENOUS at 03:01

## 2019-01-11 RX ADMIN — IPRATROPIUM BROMIDE AND ALBUTEROL SULFATE 3 ML: .5; 3 SOLUTION RESPIRATORY (INHALATION) at 07:01

## 2019-01-11 NOTE — PROGRESS NOTES
[facility]  Cardiology  Progress Note    Patient Name: Papito Bhakta  MRN: [id number]  Admission Date: 1/5/2019  Hospital Length of Stay: 5 days  Code Status: Full Code   Attending Physician: Jayden Gray MD   Primary Care Physician: Brynn To MD  Expected Discharge Date:   Principal Problem:Acute respiratory failure with hypoxia    Subjective:     Hospital Course:   1-7:  Intubated post respiratory failure  1-8:  Started on dobutamine infusion  1-9:  Failed SBT    Interval History:  Better today and discussed with Pulmonary.  He was successfully extubated and denies any complaints currently.    Telemetry:  Normal sinus rhythm    Review of Systems   All other systems reviewed and are negative.    Objective:     Vital Signs (Most Recent):  Temp: 98.6 °F (37 °C) (01/10/19 1530)  Pulse: 86 (01/10/19 1700)  Resp: 16 (01/10/19 1700)  BP: (!) 105/53 (01/10/19 1700)  SpO2: 95 % (01/10/19 1700) Vital Signs (24h Range):  Temp:  [98.2 °F (36.8 °C)-98.7 °F (37.1 °C)] 98.6 °F (37 °C)  Pulse:  [73-92] 86  Resp:  [6-43] 16  SpO2:  [94 %-100 %] 95 %  BP: (102-139)/(53-71) 105/53     Weight: (!) 141.1 kg (311 lb)  Body mass index is 36.88 kg/m².     SpO2: 95 %  O2 Device (Oxygen Therapy): ventilator      Intake/Output Summary (Last 24 hours) at 1/10/2019 1807  Last data filed at 1/10/2019 0600  Gross per 24 hour   Intake 612.16 ml   Output 1230 ml   Net -617.84 ml       Lines/Drains/Airways     Central Venous Catheter Line                 Tunneled Central Line Insertion/Assessment - Triple Lumen  01/05/19 2130 right internal jugular 4 days                Physical Exam   Constitutional: He is oriented to person, place, and time. No distress.   HENT:   Head: Normocephalic and atraumatic.   Eyes: Conjunctivae and EOM are normal. Pupils are equal, round, and reactive to light.   Neck: Normal range of motion. Neck supple.   Cardiovascular: Normal rate and regular rhythm.   Pulmonary/Chest: Effort normal and  breath sounds normal.   Abdominal: Soft. Bowel sounds are normal.   Musculoskeletal: He exhibits no edema.   Neurological: He is alert and oriented to person, place, and time.   Skin: Skin is warm and dry.   Psychiatric: He has a normal mood and affect. His behavior is normal.       Significant Labs:   BMP:   Recent Labs   Lab 01/09/19  0409 01/10/19  0315 01/10/19  0316   *  --  101     --  139   K 3.5  --  3.8   CL 98  --  98   CO2 30*  --  27   BUN 21  --  24*   CREATININE 1.4  --  1.5*   CALCIUM 8.7  --  8.8   MG 1.9 2.0  --     and CBC   Recent Labs   Lab 01/09/19  0409 01/10/19  0315   WBC 8.71 10.77   HGB 11.2* 11.5*   HCT 31.9* 34.5*    245       Significant Imaging: Echocardiogram:   Transthoracic echo (TTE) complete (Cupid Only):   Results for orders placed or performed during the hospital encounter of 11/04/18   Transthoracic echo (TTE) complete (Cupid Only)   Result Value Ref Range    BSA 2.84 m2    Ascending aorta 3.43 cm    STJ 2.46 cm    AV mean gradient 5.10 mmHg    Ao peak nicola 1.43 m/s    Ao VTI 24.82 cm    IVRT 0.09 msec    IVS 1.18 (A) 0.6 - 1.1 cm    LA size 5.85 cm    Left Atrium Major Axis 6.77 cm    Left Atrium Minor Axis 6.36 cm    LVIDD 6.97 (A) 3.5 - 6.0 cm    LVIDS 6.19 (A) 2.1 - 4.0 cm    LVOT diameter 2.40 cm    LVOT peak VTI 15.73 cm    PW 1.15 (A) 0.6 - 1.1 cm    MV Peak A Nicola 0.42 m/s    E wave decelartion time 146.03 msec    MV Peak E Nicola 1.18 m/s    RA Major Axis 5.39 cm    RA Width 4.82 cm    RVDD 5.41 cm    Sinus 3.73 cm    TR Max Nicola 2.44 m/s    TDI LATERAL 0.10     TDI SEPTAL 0.09     LA WIDTH 5.23 cm    Ao root annulus 3.42 cm    AORTIC VALVE CUSP SEPERATION 2.02 cm    PV PEAK VELOCITY 1.09 cm/s    PV peak gradient 4.71 mmHg    MV stenosis pressure 1/2 time 42.35 ms    AV peak gradient 8.22 mmHg    LV Diastolic Volume 253.21 mL    LV Systolic Volume 193.03 mL    LV LATERAL E/E' RATIO 11.80     LV SEPTAL E/E' RATIO 13.11     FS 11 %    QEF 23.77 %    LA  volume 170.56 cm3    LV mass 388.10 g    Left Ventricle Relative Wall Thickness 0.33 cm    AV valve area 2.87 cm2    MV valve area p 1/2 method 5.19 cm2    E/A ratio 2.81     Mean e' 0.10     LVOT area 4.52 cm2    LVOT stroke volume 71.12 cm3    E/E' ratio 12.42     LV Systolic Volume Index 68.0 mL/m2    LV Diastolic Volume Index 89.16 mL/m2    LA Volume Index 60.1 mL/m2    LV Mass Index 136.7 g/m2    Triscuspid Valve Regurgitation Peak Gradient 23.81 mmHg    Right Atrial Pressure (from IVC) 8.0 mmHg    TV rest pulmonary artery pressure 31.81 mmHg     Assessment and Plan:     Brief HPI:     * Acute respiratory failure with hypoxia    Stable post extubation 1-10     Acute on chronic combined systolic and diastolic congestive heart failure    Severe biventricular dysfunction  Previously had refused biventricular ICD  Stable on low-dose dobutamine  Likely wean in a.m. and add back medicines for secondary prevention if tolerated  Adjust diuretics as needed     Essential hypertension    All meds held with hypotension     Hyperlipidemia    Statin         VTE Risk Mitigation (From admission, onward)        Ordered     enoxaparin injection 40 mg  Daily      01/05/19 1036     IP VTE HIGH RISK PATIENT  Once      01/05/19 1037        * ICU time spent on case 20 min including face time with patient    Shailesh Eng MD  Cardiology  [facility]

## 2019-01-11 NOTE — PLAN OF CARE
Problem: Adult Inpatient Plan of Care  Goal: Plan of Care Review  Pt transferred to telemetry floor via wheelchair. Pt able to ambulate to bed C/O right heel pain upon standing, site assessed and palpated. No tenderness noted, no sign of pressure ulcer formation, will pass on to primary MD. Pt free of injury. Report given to receiving RN. Pt oriented to call light, bed and telephone. Pt has SOB on exertion. Cough deep breathing exercises demonstrated and encouraged. Pt still has a productive cough. Resp treatment ordered and administered.

## 2019-01-11 NOTE — SUBJECTIVE & OBJECTIVE
HPI:  Mr. Bhakta is a 64 yo male with HFrEF (EF 20%) and HTN who presented to the ED on 1/5/19 complaining of gradually worsening SOB with associated bilateral lower extremity swelling x1 week and productive cough. He is not on supplementary O2 at home. He reported compliance with his lasix 80mg x2/day. He denied fever, chills, diaphoresis, nausea, emesis, diarrhea, abdominal pain, chest pain. Patient was hypoxic upon arrival to 80%. He was started on BiPAP and diuresis as his CXR noted significant pulmonary edema.     Hospital Course:  Mr. Bhakta was admitted for respiratory failure thought to be due to CHF exacerbation. He was started on BiPAP and diuresed. He had no leukocytosis or fever at the time of presentation. He had worsening respiratory failure the night of 1/5 and required intubation. Upon intubation he was found to have a copious amount of thick, yellow sputum, almost appearing purulent. He had been started on broad spectrum antibiotics with cefepime and vancomycin. Respiratory culture grew gram positive cocci in pairs and chains and GNR's. Shock also developed on the night of 1/5, suspected to be cardiogenic vs septic, so a central line was placed and levophed started. He developed a fever on 1/6 (102 F), antibiotics continued. Shock resolved on 1/8 and he has failed SBT's due to tachypnea, high work of breathing despite diuresis since 1/7. Cardiology consulted on 1/8 to help with optimization of HF regimen as repeat 2D echo noted worsening of EF to 10%.  Dobutamine 2 mcg/kg/min stated on 1/8 and diuresis continued.     Interval History/Significant Events:    Extubated yesterday. Now tolerating NC without issues. Still with productive sputum. No complaints. Dobutamine weaned.     Objective:     Vital Signs (Most Recent):  Temp: 98.8 °F (37.1 °C) (01/11/19 0730)  Pulse: 79 (01/11/19 1055)  Resp: 18 (01/11/19 1055)  BP: 110/62 (01/11/19 1030)  SpO2: 100 % (01/11/19 1055) Vital Signs (24h  Range):  Temp:  [98.6 °F (37 °C)-99.5 °F (37.5 °C)] 98.8 °F (37.1 °C)  Pulse:  [77-93] 79  Resp:  [16-37] 18  SpO2:  [87 %-100 %] 100 %  BP: (105-134)/() 110/62     Weight: (!) 141.1 kg (311 lb)  Body mass index is 36.88 kg/m².      Intake/Output Summary (Last 24 hours) at 1/11/2019 1320  Last data filed at 1/11/2019 0600  Gross per 24 hour   Intake 354 ml   Output 1375 ml   Net -1021 ml     -3.3L since admission.     Physical Exam   Constitutional: He appears well-developed. He does not have a sickly appearance. No distress. Nasal cannula in place.   HENT:   Head: Normocephalic and atraumatic.   Eyes: Conjunctivae are normal. Pupils are equal, round, and reactive to light. Right eye exhibits no discharge. Left eye exhibits no discharge. No scleral icterus.   Neck: Normal range of motion. Neck supple. JVD: unable to assess; TLC in place. No tracheal deviation present. No thyromegaly present.   Cardiovascular: Normal rate, regular rhythm, S1 normal and S2 normal.   Pulses:       Radial pulses are 2+ on the right side, and 2+ on the left side.        Dorsalis pedis pulses are 1+ on the right side, and 1+ on the left side.   LE's warm. 1+ DP pulses palpated bilaterally    Pulmonary/Chest: No accessory muscle usage. No respiratory distress. He has no decreased breath sounds. He has no wheezes. He has rales in the right middle field and the left middle field.   Abdominal: Soft. Bowel sounds are normal. He exhibits no distension. There is no tenderness. There is no guarding.   Lymphadenopathy:     He has no cervical adenopathy.   Neurological: GCS eye subscore is 4. GCS verbal subscore is 1. GCS motor subscore is 6.   RASS 0, alert, interactive, able to follow commands consistently. Moves all extremities when prompted with good strength.    Skin: Skin is dry. He is not diaphoretic. There is pallor.       Vents:  Vent Mode: PS/CPAP (01/10/19 1130)  Ventilator Initiated: Yes (01/05/19 1945)  Set Rate: 16 bmp (01/10/19  0930)  Vt Set: 540 mL (01/10/19 0930)  Pressure Support: 5 cmH20 (01/10/19 1210)  PEEP/CPAP: 5 cmH20 (01/10/19 1210)  Oxygen Concentration (%): 40 (01/11/19 0500)  Peak Airway Pressure: 4 cmH2O (01/10/19 1210)  Total Ve: 0 mL (01/10/19 1210)  F/VT Ratio<105 (RSBI): (!) 54.47 (01/10/19 1130)    Lines/Drains/Airways     Central Venous Catheter Line                 Tunneled Central Line Insertion/Assessment - Triple Lumen  01/05/19 2130 right internal jugular 5 days                Significant Labs:    CBC/Anemia Profile:  Recent Labs   Lab 01/10/19  0315 01/11/19  0200   WBC 10.77 7.76   HGB 11.5* 14.2   HCT 34.5* 44.3    218   MCV 83 78*   RDW 16.4* 15.1*        Chemistries:  Recent Labs   Lab 01/10/19  0315 01/10/19  0316 01/11/19  0200   NA  --  139 141   K  --  3.8 3.5   CL  --  98 100   CO2  --  27 31*   BUN  --  24* 25*   CREATININE  --  1.5* 1.5*   CALCIUM  --  8.8 8.8   MG 2.0  --  2.2   PHOS 4.3  --  3.4       Significant Imaging:  I have reviewed all pertinent imaging results/findings within the past 24 hours.

## 2019-01-11 NOTE — ASSESSMENT & PLAN NOTE
Severe biventricular dysfunction  Previously had refused biventricular ICD  Stable on low-dose dobutamine - d/c today  Add aldactone and ACE-I  Add coreg tomorrow  Ok for telemetry

## 2019-01-11 NOTE — ASSESSMENT & PLAN NOTE
Multifactorial.. mixed clinical picture as persistent radiographic infiltrates, febrile on admission, purulent sputum.. However, Procal normal x 2, respiratory cultures unrevealing and profound BiV failure/NICM... Extubated yesterday without issue.     --  Plan to complete 7 day course of abx for PNA/aspiration pneumonitis.  --  Diuretics to maintain negative fluid balance  -- GDMT for HFrEF as outline above   -- Wean supplemental O2 to maintain SpO2>88%   -- Aggressive pulmonary toilet/cough encouragement.. Add Mucous clearance device/bronchodilators  -- Aggressive PT  -- NIPPV prn and QHS

## 2019-01-11 NOTE — PLAN OF CARE
Problem: Adult Inpatient Plan of Care  Goal: Plan of Care Review  Pt awake in bed denies discomforts. Pt coherent and able to use Yankauer suction. Call light within reach and pt able to use effectively. Pt aware of plan of care. Rigorous pulmonary toilet explained and demonstrated. Pt has productive cough. Rationale given for intervention and pt voiced understanding. Active ROM encouraged and frequent repositioning done for comfort. Skin intact. Pt free from injury. Pt remains on 4L nasal cannula.

## 2019-01-11 NOTE — SUBJECTIVE & OBJECTIVE
Interval History: Doing great after extubation yesterday.  No complaints.    Review of Systems   Constitutional: Negative for chills and fever.   HENT: Negative for ear discharge and ear pain.    Eyes: Negative for pain and itching.   Neurological: Negative for seizures and syncope.     Objective:     Vital Signs (Most Recent):  Temp: 98.8 °F (37.1 °C) (01/11/19 0730)  Pulse: 82 (01/11/19 0730)  Resp: (!) 23 (01/11/19 0730)  BP: 123/62 (01/11/19 0730)  SpO2: 97 % (01/11/19 0730) Vital Signs (24h Range):  Temp:  [98.6 °F (37 °C)-99.5 °F (37.5 °C)] 98.8 °F (37.1 °C)  Pulse:  [76-93] 82  Resp:  [12-37] 23  SpO2:  [87 %-100 %] 97 %  BP: (105-139)/() 123/62     Weight: (!) 141.1 kg (311 lb)  Body mass index is 36.88 kg/m².    Intake/Output Summary (Last 24 hours) at 1/11/2019 1029  Last data filed at 1/11/2019 0600  Gross per 24 hour   Intake 354 ml   Output 1375 ml   Net -1021 ml      Physical Exam   Constitutional: He is oriented to person, place, and time. He appears well-developed and well-nourished.   HENT:   Right Ear: External ear normal.   Left Ear: External ear normal.   Nose: Nose normal.   Eyes: Conjunctivae are normal. Right eye exhibits no discharge.   Cardiovascular: Normal rate and normal heart sounds.   No murmur heard.  Pulmonary/Chest: Effort normal. He has no wheezes.   Coarse particularly over right base, less so than previously   Abdominal: Soft. Bowel sounds are normal. He exhibits no distension. There is no tenderness.   Musculoskeletal: He exhibits edema (much improved from admission). He exhibits no deformity.   Neurological: He is alert and oriented to person, place, and time.   Skin: Skin is warm and dry.       Significant Labs:   BMP:   Recent Labs   Lab 01/11/19  0200   GLU 93      K 3.5      CO2 31*   BUN 25*   CREATININE 1.5*   CALCIUM 8.8   MG 2.2     CBC:   Recent Labs   Lab 01/10/19  0315 01/11/19  0200   WBC 10.77 7.76   HGB 11.5* 14.2   HCT 34.5* 44.3    218

## 2019-01-11 NOTE — ASSESSMENT & PLAN NOTE
Intubated for respiratory failure due to CHF exacerbation and CAP.  Pulm/CC consulted. No wheezes on exam. Broad spectrum abx.   Resp stain/Cx with resp yung but no bacterial growth on culture. BCx NGTD. Diurese with IV Lasix.   Appreciate Pulmonary input.  Dobutamine drip started per Cards for better diuresis.  Extubated on 1/10 and doing great on NC.  Transfer out of ICU.  PT/OT evaluation.

## 2019-01-11 NOTE — PLAN OF CARE
Problem: Adult Inpatient Plan of Care  Goal: Plan of Care Review  Recommendations     1. Continue current diet order & encourage continued adequate intake of meals daily    2. If meal intake is consistently <50%, order Boost Plus TID   Goals: TF initiation or diet advancement w/in 24 hrs  Nutrition Goal Status: goal met

## 2019-01-11 NOTE — PLAN OF CARE
Problem: Adult Inpatient Plan of Care  Goal: Plan of Care Review  Outcome: Ongoing (interventions implemented as appropriate)  Patient remains in ICU. Continues on dobutamine drip and cefepime IV. TLC to right jugular with clean dressing. Patient continues to cough up thick yellow sputum and is able to use Yankauer independently. BM x1 on bedpan. Using urinal and voiding adequate amount of dark yellow urine. Turns self in bed with assistance. Tolerating clear liquid diet, no trouble noted when drinking liquids. No complaints of pain.

## 2019-01-11 NOTE — PROGRESS NOTES
"  [facility]  Adult Nutrition  Consult Note    SUMMARY     Recommendations    1. Continue current diet order & encourage continued adequate intake of meals daily    2. If meal intake is consistently <50%, order Boost Plus TID   Goals: TF initiation or diet advancement w/in 24 hrs  Nutrition Goal Status: goal met  Communication of RD Recs: discussed on rounds    Reason for Assessment    Reason For Assessment: RD follow-up  Diagnosis: (acute respiratory failure)  Relevant Medical History: CHF, HLD, HTN  Interdisciplinary Rounds: attended  General Information Comments: TF discontinued as pt was extubated yesterday; diet advanced to Cardiac this AM. Pt reports a good appetite/tolerance & ate most of his meal. Pt plans to continue to follow a Low Na diet at home & hopes to continue to have gradual, desired wt loss. NFPE not performed again today as pt w/ no indicators of malnutrition.   Nutrition Discharge Planning: Adequate intake of meals to meet nutr needs    Nutrition Risk Screen    Nutrition Risk Screen: no indicators present    Nutrition/Diet History    Patient Reported Diet/Restrictions/Preferences: low salt  Food Preferences: [place]  Spiritual, Cultural Beliefs, Zoroastrian Practices, Values that Affect Care: no  Factors Affecting Nutritional Intake: None identified at this time    Anthropometrics    Temp: 98.8 °F (37.1 °C)  Height Method: Stated  Height: 6' 5" (195.6 cm)  Height (inches): 77 in  Weight Method: Bed Scale  Weight: (!) 141.1 kg (311 lb)  Weight (lb): (!) 311 lb  Ideal Body Weight (IBW), Male: 208 lb  % Ideal Body Weight, Male (lb): 149.52 lb  BMI (Calculated): 37  BMI Grade: 35 - 39.9 - obesity - grade II  Weight Loss: intentional  Usual Body Weight (UBW), kg: (!) 150 kg  % Usual Body Weight: 94.46  % Weight Change From Usual Weight: -5.73 %       Lab/Procedures/Meds    Pertinent Labs Reviewed: reviewed  Pertinent Labs Comments: CO2 31, BUN 25, Crt 1.5  Pertinent Medications Reviewed: " reviewed  Pertinent Medications Comments: dobutamine, statin, lasix    Estimated/Assessed Needs    Weight Used For Calorie Calculations: (!) 141.4 kg (311 lb 11.7 oz)  Energy Calorie Requirements (kcal): 2326  Energy Need Method: Minerva-St Jeor     Protein Requirements:  g (.6-.8 g/kg)  Weight Used For Protein Calculations: (!) 141.1 kg (311 lb 1.1 oz)     Fluid Requirements (mL): 1mL/kcal or per MD  Estimated Fluid Requirement Method: RDA Method  RDA Method (mL): 2326       Nutrition Prescription Ordered    Current Diet Order: Cardiac    Evaluation of Received Nutrient/Fluid Intake    IV Fluid (mL): (no continuous IVF)  I/O: reviewed  Energy Calories Required: meeting needs  Protein Required: meeting needs  Fluid Required: meeting needs  Comments: LBM 1/10  Tolerance: tolerating  % Intake of Estimated Energy Needs: 75 - 100 %  % Meal Intake: 75 - 100 %    Nutrition Risk    Level of Risk/Frequency of Follow-up: (F/u 1 x weekly)     Assessment and Plan    Nutrition Problem  Inadequate energy intake     Related to (etiology):   NPO w/ no alternate means of nutr in place     Signs and Symptoms (as evidenced by):   <85% of EEN/EPN being met     Interventions:  Collaboration of other providers      Nutrition Diagnosis Status:   Resolved     Monitor and Evaluation    Food and Nutrient Intake: energy intake, food and beverage intake  Food and Nutrient Adminstration: diet order, enteral and parenteral nutrition administration  Knowledge/Beliefs/Attitudes: beliefs and attitudes, food and nutrition knowledge/skill  Physical Activity and Function: nutrition-related ADLs and IADLs  Anthropometric Measurements: weight change, weight  Biochemical Data, Medical Tests and Procedures: electrolyte and renal panel, glucose/endocrine profile  Nutrition-Focused Physical Findings: overall appearance     Malnutrition Assessment         Subcutaneous Fat Loss (Final Summary): well nourished  Muscle Loss Evaluation (Final Summary):  well nourished         Nutrition Follow-Up    RD Follow-up?: Yes

## 2019-01-11 NOTE — PT/OT/SLP EVAL
"Occupational Therapy   Evaluation    Name: Papito Bhakta  MRN: [id number]  Admitting Diagnosis:  Acute respiratory failure with hypoxia      Recommendations:     Discharge Recommendations:    Discharge Equipment Recommendations:  none  Barriers to discharge:  None    Assessment:     Papito Bhakta is a 63 y.o. male with a medical diagnosis of Acute respiratory failure with hypoxia.  He presents with  independence for self-care and functional transfers. Performance deficits affecting function: weakness, impaired endurance, impaired self care skills, impaired functional mobilty, decreased upper extremity function, decreased safety awareness, pain, decreased ROM, impaired coordination, edema, impaired cardiopulmonary response to activity.      Rehab Prognosis: Good; patient would benefit from acute skilled OT services to address these deficits and reach maximum level of function.       Plan:     Patient to be seen 3 x/week to address the above listed problems via self-care/home management, therapeutic activities, therapeutic exercises  · Plan of Care Expires: 19  · Plan of Care Reviewed with: patient    Subjective     Chief Complaint: "My foot really hurts."  Patient/Family Comments/goals: "I need my medicine for my foot, it makes the pain go away."    Occupational Profile:  Living Environment: Patient lives with his daughter  Previous level of function: modified independent, including driving  Equipment Used at Home:  none  Assistance upon Discharge: from daughter    Pain/Comfort:  · Pain Rating 1: (unable to rate)  · Location - Side 1: Right  · Location 1: heel  · Pain Addressed 1: Reposition, Distraction    Patients cultural, spiritual, Yarsanism conflicts given the current situation: no    Objective:     Communicated with: nurse prior to session.  Patient found supine with oxygen, peripheral IV, telemetry upon OT entry to room.    General Precautions: Standard, fall, respiratory   Orthopedic " Precautions:N/A   Braces: N/A     Occupational Performance:    Bed Mobility:    · Patient completed Rolling/Turning to Left with  contact guard assistance  · Patient completed Scooting/Bridging with contact guard assistance  · Patient completed Supine to Sit with contact guard assistance    Functional Mobility/Transfers:  · Patient completed Sit <> Stand Transfer with minimum assistance  with  rolling walker   · Functional Mobility: able to take side steps along the bed limited secondary to pain    Activities of Daily Living:  · Feeding:  independence    · Upper Body Dressing: minimum assistance    · Lower Body Dressing: total assistance      Cognitive/Visual Perceptual:  Cognitive/Psychosocial Skills:     -       Oriented to: Person, Place and Situation   -       Follows Commands/attention:Follows one-step commands  -       Communication: clear/fluent  -       Memory: No Deficits noted  -       Safety awareness/insight to disability: intact   -       Mood/Affect/Coping skills/emotional control: Appropriate to situation    Physical Exam:  Balance:    -       sit balance good; standing balance fair plus  Postural examination/scapula alignment:    -       Rounded shoulders  Skin integrity: Visible skin intact  Upper Extremity Range of Motion:     -       Right Upper Extremity: WFL  -       Left Upper Extremity: WFL  Upper Extremity Strength:    -       Right Upper Extremity: WFL  -       Left Upper Extremity: WFL    AMPAC 6 Click ADL:  AMPAC Total Score: 16    Treatment & Education:  Evaluation   Education:    Patient left seated EOB with all lines intact, call button in reach and nurse notified    GOALS:   Multidisciplinary Problems     Occupational Therapy Goals        Problem: Occupational Therapy Goal    Goal Priority Disciplines Outcome Interventions   Occupational Therapy Goal     OT, PT/OT Ongoing (interventions implemented as appropriate)    Description:  Goals to be met by: 1/18/2019     Patient will increase  "functional independence with ADLs by performing:    UE Dressing with Modified [place].  LE Dressing with Set-up Assistance.  Grooming while standing at sink with Stand-by Assistance.  Toileting from bedside commode with Set-up Assistance for hygiene and clothing management.   Step transfer with Contact Guard Assistance with AE  Toilet transfer to bedside commode with Contact Guard Assistance.  Upper extremity exercise program with assistance as needed.                      History:     Past Medical History:   Diagnosis Date    CHF (congestive heart failure)     Hyperlipidemia     Hypertension        Past Surgical History:   Procedure Laterality Date    Heart Cath-Bilateral Bilateral 2018    Performed [facility]ail[facility] C[facility]    TESTICLE SURGERY         Clinical Decision Makin.  OT Low:  "Pt evaluation falls under low complexity for evaluation coding due to performance deficits noted in 1-3 areas as stated above and 0 co-morbities affecting current functional status. Data obtained from problem focused assessments. No modifications or assistance was required for completion of evaluation. Only brief occupational profile and history review completed."     Time Tracking:     OT Date of Treatment: 19  OT Start Time: 1650  OT Stop Time: 1703  OT Total Time (min): 13 min    Billable Minutes:Evaluation 13 minutes with PT    Lisa Wu OT  2019    "

## 2019-01-11 NOTE — PLAN OF CARE
Problem: Physical Therapy Goal  Goal: Physical Therapy Goal  Goals to be met by: 19     Patient will increase functional independence with mobility by performin. Supine to sit with Modified [place]  2. Rolling to Left and Right with Modified [place]  3. Sit to stand transfer with Modified [place] using RW  4. Bed to chair transfer with Modified [place] using Rolling Walker  5. Gait  x 250 feet with Modified [place] using Rolling Walker   6. Lower extremity exercise program 3 sets x10 reps per handout, with independence    Pt ambulated ~4-5 sidesteps along bedside with CGA using RW and 2L O2 NC, gait limited 2* R heel pain.

## 2019-01-11 NOTE — PLAN OF CARE
01/11/19 1523   Discharge Reassessment   Assessment Type Discharge Planning Reassessment   Provided patient/caregiver education on the expected discharge date and the discharge plan No   Do you have any problems affording any of your prescribed medications? No   Discharge Plan A Home with family;Home Health   Discharge Plan B Skilled Nursing Facility   DME Needed Upon Discharge  (TBD )   Patient choice form signed by patient/caregiver No   Can the patient answer the patient profile reliably? No, cognitively impaired   How does the patient rate their overall health at the present time? Fair   Describe the patient's ability to walk at the present time. Does not walk or unable to take any steps at all   How often would a person be available to care for the patient? Often   Number of comorbid conditions (as recorded on the chart) Four   During the past month, has the patient often been bothered by feeling down, depressed or hopeless? No   During the past month, has the patient often been bothered by little interest or pleasure in doing things? No   Post-Acute Status   Post-Acute Authorization (TBD )

## 2019-01-11 NOTE — SUBJECTIVE & OBJECTIVE
Interval History:  Better today and discussed with Pulmonary.  He was successfully extubated and denies any complaints currently.    Telemetry:  Normal sinus rhythm    Review of Systems   All other systems reviewed and are negative.    Objective:     Vital Signs (Most Recent):  Temp: 98.6 °F (37 °C) (01/10/19 1530)  Pulse: 86 (01/10/19 1700)  Resp: 16 (01/10/19 1700)  BP: (!) 105/53 (01/10/19 1700)  SpO2: 95 % (01/10/19 1700) Vital Signs (24h Range):  Temp:  [98.2 °F (36.8 °C)-98.7 °F (37.1 °C)] 98.6 °F (37 °C)  Pulse:  [73-92] 86  Resp:  [6-43] 16  SpO2:  [94 %-100 %] 95 %  BP: (102-139)/(53-71) 105/53     Weight: (!) 141.1 kg (311 lb)  Body mass index is 36.88 kg/m².     SpO2: 95 %  O2 Device (Oxygen Therapy): ventilator      Intake/Output Summary (Last 24 hours) at 1/10/2019 1807  Last data filed at 1/10/2019 0600  Gross per 24 hour   Intake 612.16 ml   Output 1230 ml   Net -617.84 ml       Lines/Drains/Airways     Central Venous Catheter Line                 Tunneled Central Line Insertion/Assessment - Triple Lumen  01/05/19 2130 right internal jugular 4 days                Physical Exam   Constitutional: He is oriented to person, place, and time. No distress.   HENT:   Head: Normocephalic and atraumatic.   Eyes: Conjunctivae and EOM are normal. Pupils are equal, round, and reactive to light.   Neck: Normal range of motion. Neck supple.   Cardiovascular: Normal rate and regular rhythm.   Pulmonary/Chest: Effort normal and breath sounds normal.   Abdominal: Soft. Bowel sounds are normal.   Musculoskeletal: He exhibits no edema.   Neurological: He is alert and oriented to person, place, and time.   Skin: Skin is warm and dry.   Psychiatric: He has a normal mood and affect. His behavior is normal.       Significant Labs:   BMP:   Recent Labs   Lab 01/09/19  0409 01/10/19  0315 01/10/19  0316   *  --  101     --  139   K 3.5  --  3.8   CL 98  --  98   CO2 30*  --  27   BUN 21  --  24*   CREATININE 1.4   --  1.5*   CALCIUM 8.7  --  8.8   MG 1.9 2.0  --     and CBC   Recent Labs   Lab 01/09/19  0409 01/10/19  0315   WBC 8.71 10.77   HGB 11.2* 11.5*   HCT 31.9* 34.5*    245       Significant Imaging: Echocardiogram:   Transthoracic echo (TTE) complete (Cupid Only):   Results for orders placed or performed during the hospital encounter of 11/04/18   Transthoracic echo (TTE) complete (Cupid Only)   Result Value Ref Range    BSA 2.84 m2    Ascending aorta 3.43 cm    STJ 2.46 cm    AV mean gradient 5.10 mmHg    Ao peak nicola 1.43 m/s    Ao VTI 24.82 cm    IVRT 0.09 msec    IVS 1.18 (A) 0.6 - 1.1 cm    LA size 5.85 cm    Left Atrium Major Axis 6.77 cm    Left Atrium Minor Axis 6.36 cm    LVIDD 6.97 (A) 3.5 - 6.0 cm    LVIDS 6.19 (A) 2.1 - 4.0 cm    LVOT diameter 2.40 cm    LVOT peak VTI 15.73 cm    PW 1.15 (A) 0.6 - 1.1 cm    MV Peak A Nicola 0.42 m/s    E wave decelartion time 146.03 msec    MV Peak E Nicola 1.18 m/s    RA Major Axis 5.39 cm    RA Width 4.82 cm    RVDD 5.41 cm    Sinus 3.73 cm    TR Max Nicola 2.44 m/s    TDI LATERAL 0.10     TDI SEPTAL 0.09     LA WIDTH 5.23 cm    Ao root annulus 3.42 cm    AORTIC VALVE CUSP SEPERATION 2.02 cm    PV PEAK VELOCITY 1.09 cm/s    PV peak gradient 4.71 mmHg    MV stenosis pressure 1/2 time 42.35 ms    AV peak gradient 8.22 mmHg    LV Diastolic Volume 253.21 mL    LV Systolic Volume 193.03 mL    LV LATERAL E/E' RATIO 11.80     LV SEPTAL E/E' RATIO 13.11     FS 11 %    QEF 23.77 %    LA volume 170.56 cm3    LV mass 388.10 g    Left Ventricle Relative Wall Thickness 0.33 cm    AV valve area 2.87 cm2    MV valve area p 1/2 method 5.19 cm2    E/A ratio 2.81     Mean e' 0.10     LVOT area 4.52 cm2    LVOT stroke volume 71.12 cm3    E/E' ratio 12.42     LV Systolic Volume Index 68.0 mL/m2    LV Diastolic Volume Index 89.16 mL/m2    LA Volume Index 60.1 mL/m2    LV Mass Index 136.7 g/m2    Triscuspid Valve Regurgitation Peak Gradient 23.81 mmHg    Right Atrial Pressure (from IVC) 8.0  mmHg    TV rest pulmonary artery pressure 31.81 mmHg

## 2019-01-11 NOTE — SUBJECTIVE & OBJECTIVE
Interval History:     Review of Systems   Constitution: Negative for decreased appetite.   HENT: Negative for ear discharge.    Eyes: Negative for blurred vision.   Endocrine: Negative for polyphagia.   Skin: Negative for nail changes.   Neurological: Negative for aphonia.   Psychiatric/Behavioral: Negative for hallucinations.     Objective:     Vital Signs (Most Recent):  Temp: 98.8 °F (37.1 °C) (01/11/19 0730)  Pulse: 82 (01/11/19 0730)  Resp: (!) 23 (01/11/19 0730)  BP: 123/62 (01/11/19 0730)  SpO2: 97 % (01/11/19 0730) Vital Signs (24h Range):  Temp:  [98.6 °F (37 °C)-99.5 °F (37.5 °C)] 98.8 °F (37.1 °C)  Pulse:  [75-93] 82  Resp:  [6-37] 23  SpO2:  [87 %-100 %] 97 %  BP: (105-139)/() 123/62     Weight: (!) 141.1 kg (311 lb)  Body mass index is 36.88 kg/m².     SpO2: 97 %  O2 Device (Oxygen Therapy): BiPAP      Intake/Output Summary (Last 24 hours) at 1/11/2019 0807  Last data filed at 1/11/2019 0600  Gross per 24 hour   Intake 354 ml   Output 1375 ml   Net -1021 ml       Lines/Drains/Airways     Central Venous Catheter Line                 Tunneled Central Line Insertion/Assessment - Triple Lumen  01/05/19 2130 right internal jugular 5 days                Physical Exam   Constitutional: He is oriented to person, place, and time. No distress.   HENT:   Head: Normocephalic and atraumatic.   Eyes: Conjunctivae and EOM are normal. Pupils are equal, round, and reactive to light.   Neck: Normal range of motion. Neck supple.   Cardiovascular: Normal rate and regular rhythm.   Pulmonary/Chest: Effort normal and breath sounds normal.   Abdominal: Soft. Bowel sounds are normal.   Musculoskeletal: He exhibits no edema.   Neurological: He is alert and oriented to person, place, and time.   Skin: Skin is warm and dry.   Psychiatric: He has a normal mood and affect. His behavior is normal.       Significant Labs: All pertinent lab results from the last 24 hours have been reviewed.    Significant Imaging:  Echocardiogram:   2D echo with color flow doppler:   Results for orders placed or performed during the hospital encounter of 01/24/18   2D echo with color flow doppler   Result Value Ref Range    QEF 15 (A) 55 - 65    Mitral Valve Regurgitation MILD     Diastolic Dysfunction Yes (A)     Est. PA Systolic Pressure 26.81     Pericardial Effusion TRIVIAL

## 2019-01-11 NOTE — PROVIDER TRANSFER
Transfer Note    Mr. Bhakta was admitted for respiratory failure thought to be due to CHF exacerbation. Echo shows EF of 10% with diastolic dysfunction. He was started on BiPAP and diuresis. He had no leukocytosis or fever at the time of presentation. CXR was consistent with pulmonary edema without evidence of consolidation on presentation. He had worsening respiratory failure the night following admission and required intubation. Upon intubation he was found to have a copious amount of thick, yellow sputum that was almost appearing purulent. He was started on broad spectrum antibiotics with cefepime and vancomycin. All cultures negative. Cardiology consulted.  Started on Dobutamine for better diuresis.  Patient extubated on 1/10 without further respiratory issues. Doing great on NC and tolerating diet. He has been continued on Cefepime.  Dobutamine drip stopped and Lasix switched to oral.  Medications adjustments per Cards. Transfer to Tele.  PT/OT for deconditioning.  Hopefully home in a couple of days.

## 2019-01-11 NOTE — PROGRESS NOTES
[facility]  Cardiology  Progress Note    Patient Name: Papito Bhakta  MRN: [id number]  Admission Date: 1/5/2019  Hospital Length of Stay: 6 days  Code Status: Full Code   Attending Physician: Jayden Gray MD   Primary Care Physician: Brynn To MD  Expected Discharge Date:   Principal Problem:Acute respiratory failure with hypoxia    Subjective:     Hospital Course:   1-7:  Intubated post respiratory failure  1-8:  Started on dobutamine infusion  1-9:  Failed SBT  1-11 Extubated. Denies CP or SOB    Interval History:     Review of Systems   Constitution: Negative for decreased appetite.   HENT: Negative for ear discharge.    Eyes: Negative for blurred vision.   Endocrine: Negative for polyphagia.   Skin: Negative for nail changes.   Neurological: Negative for aphonia.   Psychiatric/Behavioral: Negative for hallucinations.     Objective:     Vital Signs (Most Recent):  Temp: 98.8 °F (37.1 °C) (01/11/19 0730)  Pulse: 82 (01/11/19 0730)  Resp: (!) 23 (01/11/19 0730)  BP: 123/62 (01/11/19 0730)  SpO2: 97 % (01/11/19 0730) Vital Signs (24h Range):  Temp:  [98.6 °F (37 °C)-99.5 °F (37.5 °C)] 98.8 °F (37.1 °C)  Pulse:  [75-93] 82  Resp:  [6-37] 23  SpO2:  [87 %-100 %] 97 %  BP: (105-139)/() 123/62     Weight: (!) 141.1 kg (311 lb)  Body mass index is 36.88 kg/m².     SpO2: 97 %  O2 Device (Oxygen Therapy): BiPAP      Intake/Output Summary (Last 24 hours) at 1/11/2019 0807  Last data filed at 1/11/2019 0600  Gross per 24 hour   Intake 354 ml   Output 1375 ml   Net -1021 ml       Lines/Drains/Airways     Central Venous Catheter Line                 Tunneled Central Line Insertion/Assessment - Triple Lumen  01/05/19 2130 right internal jugular 5 days                Physical Exam   Constitutional: He is oriented to person, place, and time. No distress.   HENT:   Head: Normocephalic and atraumatic.   Eyes: Conjunctivae and EOM are normal. Pupils are equal, round, and reactive to light.   Neck:  Normal range of motion. Neck supple.   Cardiovascular: Normal rate and regular rhythm.   Pulmonary/Chest: Effort normal and breath sounds normal.   Abdominal: Soft. Bowel sounds are normal.   Musculoskeletal: He exhibits no edema.   Neurological: He is alert and oriented to person, place, and time.   Skin: Skin is warm and dry.   Psychiatric: He has a normal mood and affect. His behavior is normal.       Significant Labs: All pertinent lab results from the last 24 hours have been reviewed.    Significant Imaging: Echocardiogram:   2D echo with color flow doppler:   Results for orders placed or performed during the hospital encounter of 01/24/18   2D echo with color flow doppler   Result Value Ref Range    QEF 15 (A) 55 - 65    Mitral Valve Regurgitation MILD     Diastolic Dysfunction Yes (A)     Est. PA Systolic Pressure 26.81     Pericardial Effusion TRIVIAL      Assessment and Plan:     Brief HPI:     * Acute respiratory failure with hypoxia    Stable post extubation 1-10     Hyperlipidemia    Statin     Acute on chronic combined systolic and diastolic congestive heart failure    Severe biventricular dysfunction  Previously had refused biventricular ICD  Stable on low-dose dobutamine - d/c today  Add aldactone and ACE-I  Add coreg tomorrow  Ok for telemetry       Essential hypertension    All meds held with hypotension         VTE Risk Mitigation (From admission, onward)        Ordered     enoxaparin injection 40 mg  Daily      01/05/19 1036     IP VTE HIGH RISK PATIENT  Once      01/05/19 1037        Will follow briefly    Ron Kaplan MD  Cardiology  [facility] [place]

## 2019-01-11 NOTE — ASSESSMENT & PLAN NOTE
Severe biventricular dysfunction  Previously had refused biventricular ICD  Stable on low-dose dobutamine  Likely wean in a.m. and add back medicines for secondary prevention if tolerated  Adjust diuretics as needed

## 2019-01-11 NOTE — ASSESSMENT & PLAN NOTE
EF of 20% and grade 3 diastolic CHF on echo on 11/2018. Diurese with IV Lasix. Hold BB/Entresto while hypotensive.  Started on Dobutamine per Cards.  Slight increase in Creat.  Now off Dobutamine gtt.  Lasix switched to oral.  On Lisinopril and Aldactone.

## 2019-01-11 NOTE — ASSESSMENT & PLAN NOTE
Empiric abx with Vanc/Cefepime. Resp Cx polymicrobial.   Cultures negative.  Vanc stopped and continued on Cefepime.

## 2019-01-11 NOTE — PROGRESS NOTES
"[facility] Hospital Medicine  Progress Note    Patient Name: Papito Bhakta  MRN: [id number]  Patient Class: IP- Inpatient   Admission Date: 1/5/2019  Length of Stay: 6 days  Attending Physician: Jayden Gray MD  Primary Care Provider: Brynn To MD        Subjective:     Principal Problem:Acute respiratory failure with hypoxia    HPI:  Mr. Bhakta is a 64 yo man with combined HF (EF 20%, G3DD), AICD in place, Hyperlipidemia, and Hypertension who presented to the ED c/o gradually worsening and severe (10/10) SOB with associated bilateral lower extremity swelling x1 week. He also reports a hematemesis and productive cough with sputum described as "like grits." He is not on supplementary O2 at home. He is compliant with his Lasix 80mg x2/day. He denies fever, chills, diaphoresis, nausea, emesis, diarrhea, abdominal pain, chest pain, back pain, difficulty urinating and rash. Patient was hypoxic upon arrival to 80%. He was started on supplemental oxygen via BiPAP. CXR w/ acute pulmonary edema. He was started on IV Lasix. He claims to be complaint with a low salt diet and Lasix.    Hospital Course:  Mr. Bhakta was admitted for respiratory failure thought to be due to CHF exacerbation. He was started on BiPAP and diuresis. He had no leukocytosis or fever at the time of presentation. CXR was consistent with pulmonary edema without evidence of consolidation on presentation. He had worsening respiratory failure the night following admission and required intubation. Upon intubation he was found to have a copious amount of thick, yellow sputum that was almost appearing purulent. He had been started on broad spectrum antibiotics with cefepime and vancomycin. Resp stain with resp yung but NGTD. Blood cultures remain NGTD.  Episodes of Vtach and Cardiology consulted.  Started on Dobutamine for better diuresis per Cards.  Patient extubated on 1/10 without further respiratory issues.  He has been " continued on Cefepime.  Dobutamine drip stopped and Lasix switched to oral.  Medications adjustments per Cards.    Interval History: Doing great after extubation yesterday.  No complaints.    Review of Systems   Constitutional: Negative for chills and fever.   HENT: Negative for ear discharge and ear pain.    Eyes: Negative for pain and itching.   Neurological: Negative for seizures and syncope.     Objective:     Vital Signs (Most Recent):  Temp: 98.8 °F (37.1 °C) (01/11/19 0730)  Pulse: 82 (01/11/19 0730)  Resp: (!) 23 (01/11/19 0730)  BP: 123/62 (01/11/19 0730)  SpO2: 97 % (01/11/19 0730) Vital Signs (24h Range):  Temp:  [98.6 °F (37 °C)-99.5 °F (37.5 °C)] 98.8 °F (37.1 °C)  Pulse:  [76-93] 82  Resp:  [12-37] 23  SpO2:  [87 %-100 %] 97 %  BP: (105-139)/() 123/62     Weight: (!) 141.1 kg (311 lb)  Body mass index is 36.88 kg/m².    Intake/Output Summary (Last 24 hours) at 1/11/2019 1029  Last data filed at 1/11/2019 0600  Gross per 24 hour   Intake 354 ml   Output 1375 ml   Net -1021 ml      Physical Exam   Constitutional: He is oriented to person, place, and time. He appears well-developed and well-nourished.   HENT:   Right Ear: External ear normal.   Left Ear: External ear normal.   Nose: Nose normal.   Eyes: Conjunctivae are normal. Right eye exhibits no discharge.   Cardiovascular: Normal rate and normal heart sounds.   No murmur heard.  Pulmonary/Chest: Effort normal. He has no wheezes.   Coarse particularly over right base, less so than previously   Abdominal: Soft. Bowel sounds are normal. He exhibits no distension. There is no tenderness.   Musculoskeletal: He exhibits edema (much improved from admission). He exhibits no deformity.   Neurological: He is alert and oriented to person, place, and time.   Skin: Skin is warm and dry.       Significant Labs:   BMP:   Recent Labs   Lab 01/11/19  0200   GLU 93      K 3.5      CO2 31*   BUN 25*   CREATININE 1.5*   CALCIUM 8.8   MG 2.2     CBC:    Recent Labs   Lab 01/10/19  0315 01/11/19  0200   WBC 10.77 7.76   HGB 11.5* 14.2   HCT 34.5* 44.3    218         Assessment/Plan:      * Acute respiratory failure with hypoxia    Intubated for respiratory failure due to CHF exacerbation and CAP.  Pulm/CC consulted. No wheezes on exam. Broad spectrum abx.   Resp stain/Cx with resp yung but no bacterial growth on culture. BCx NGTD. Diurese with IV Lasix.   Appreciate Pulmonary input.  Dobutamine drip started per Cards for better diuresis.  Extubated on 1/10 and doing great on NC.  Transfer out of ICU.  PT/OT evaluation.     Acute on chronic combined systolic and diastolic congestive heart failure    EF of 20% and grade 3 diastolic CHF on echo on 11/2018. Diurese with IV Lasix. Hold BB/Entresto while hypotensive.  Started on Dobutamine per Cards.  Slight increase in Creat.  Now off Dobutamine gtt.  Lasix switched to oral.  On Lisinopril and Aldactone.     CAP (community acquired pneumonia)    Empiric abx with Vanc/Cefepime. Resp Cx polymicrobial.   Cultures negative.  Vanc stopped and continued on Cefepime.     Encephalopathy, metabolic    Agitated and required sedation as he was reaching for ET tube. 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 Weaned sedation with improvement in mental status on 1/7/2019.     Hyperlipidemia    On statin.     Acute pulmonary edema    Diurese with IV Lasix. Also component of pneumonia.     BMI 40.0-44.9, adult    Weight lose as out patient.     Essential hypertension    Held home Entresto and Coreg for shock requiring Levophed. Given IV Lasix for diuresis.  Able to wean off Levophed.  Follow Cards input.         VTE Risk Mitigation (From admission, onward)        Ordered     enoxaparin injection 40 mg  Daily      01/05/19 1036     IP VTE HIGH RISK PATIENT  Once      01/05/19 1037        Jayden Gray MD  Department of Hospital Medicine   [facility]

## 2019-01-11 NOTE — PROGRESS NOTES
[facility] [place]  [place] Note    Patient Name: Papito Bhakta  MRN: [id number]  Admission Date: 1/5/2019  Hospital Length of Stay: 6 days  Code Status: Full Code  Attending Provider: Jayden Gray MD  Primary Care Provider: Brynn To MD   Principal Problem: Acute respiratory failure with hypoxia    Subjective:     HPI:  Mr. Bhakta is a 64 yo male with HFrEF (EF 20%) and HTN who presented to the ED on 1/5/19 complaining of gradually worsening SOB with associated bilateral lower extremity swelling x1 week and productive cough. He is not on supplementary O2 at home. He reported compliance with his lasix 80mg x2/day. He denied fever, chills, diaphoresis, nausea, emesis, diarrhea, abdominal pain, chest pain. Patient was hypoxic upon arrival to 80%. He was started on BiPAP and diuresis as his CXR noted significant pulmonary edema.     Hospital Course:  Mr. Bhakta was admitted for respiratory failure thought to be due to CHF exacerbation. He was started on BiPAP and diuresed. He had no leukocytosis or fever at the time of presentation. He had worsening respiratory failure the night of 1/5 and required intubation. Upon intubation he was found to have a copious amount of thick, yellow sputum, almost appearing purulent. He had been started on broad spectrum antibiotics with cefepime and vancomycin. Respiratory culture grew gram positive cocci in pairs and chains and GNR's. Shock also developed on the night of 1/5, suspected to be cardiogenic vs septic, so a central line was placed and levophed started. He developed a fever on 1/6 (102 F), antibiotics continued. Shock resolved on 1/8 and he has failed SBT's due to tachypnea, high work of breathing despite diuresis since 1/7. Cardiology consulted on 1/8 to help with optimization of HF regimen as repeat 2D echo noted worsening of EF to 10%.  Dobutamine 2 mcg/kg/min stated on 1/8 and diuresis continued.     Interval History/Significant  Events:    Extubated yesterday. Now tolerating NC without issues. Still with productive sputum. No complaints. Dobutamine weaned.     Objective:     Vital Signs (Most Recent):  Temp: 98.8 °F (37.1 °C) (01/11/19 0730)  Pulse: 79 (01/11/19 1055)  Resp: 18 (01/11/19 1055)  BP: 110/62 (01/11/19 1030)  SpO2: 100 % (01/11/19 1055) Vital Signs (24h Range):  Temp:  [98.6 °F (37 °C)-99.5 °F (37.5 °C)] 98.8 °F (37.1 °C)  Pulse:  [77-93] 79  Resp:  [16-37] 18  SpO2:  [87 %-100 %] 100 %  BP: (105-134)/() 110/62     Weight: (!) 141.1 kg (311 lb)  Body mass index is 36.88 kg/m².      Intake/Output Summary (Last 24 hours) at 1/11/2019 1320  Last data filed at 1/11/2019 0600  Gross per 24 hour   Intake 354 ml   Output 1375 ml   Net -1021 ml     -3.3L since admission.     Physical Exam   Constitutional: He appears well-developed. He does not have a sickly appearance. No distress. Nasal cannula in place.   HENT:   Head: Normocephalic and atraumatic.   Eyes: Conjunctivae are normal. Pupils are equal, round, and reactive to light. Right eye exhibits no discharge. Left eye exhibits no discharge. No scleral icterus.   Neck: Normal range of motion. Neck supple. JVD: unable to assess; TLC in place. No tracheal deviation present. No thyromegaly present.   Cardiovascular: Normal rate, regular rhythm, S1 normal and S2 normal.   Pulses:       Radial pulses are 2+ on the right side, and 2+ on the left side.        Dorsalis pedis pulses are 1+ on the right side, and 1+ on the left side.   LE's warm. 1+ DP pulses palpated bilaterally    Pulmonary/Chest: No accessory muscle usage. No respiratory distress. He has no decreased breath sounds. He has no wheezes. He has rales in the right middle field and the left middle field.   Abdominal: Soft. Bowel sounds are normal. He exhibits no distension. There is no tenderness. There is no guarding.   Lymphadenopathy:     He has no cervical adenopathy.   Neurological: GCS eye subscore is 4. GCS verbal  subscore is 1. GCS motor subscore is 6.   RASS 0, alert, interactive, able to follow commands consistently. Moves all extremities when prompted with good strength.    Skin: Skin is dry. He is not diaphoretic. There is pallor.       Vents:  Vent Mode: PS/CPAP (01/10/19 1130)  Ventilator Initiated: Yes (01/05/19 1945)  Set Rate: 16 bmp (01/10/19 0930)  Vt Set: 540 mL (01/10/19 0930)  Pressure Support: 5 cmH20 (01/10/19 1210)  PEEP/CPAP: 5 cmH20 (01/10/19 1210)  Oxygen Concentration (%): 40 (01/11/19 0500)  Peak Airway Pressure: 4 cmH2O (01/10/19 1210)  Total Ve: 0 mL (01/10/19 1210)  F/VT Ratio<105 (RSBI): (!) 54.47 (01/10/19 1130)    Lines/Drains/Airways     Central Venous Catheter Line                 Tunneled Central Line Insertion/Assessment - Triple Lumen  01/05/19 2130 right internal jugular 5 days                Significant Labs:    CBC/Anemia Profile:  Recent Labs   Lab 01/10/19  0315 01/11/19  0200   WBC 10.77 7.76   HGB 11.5* 14.2   HCT 34.5* 44.3    218   MCV 83 78*   RDW 16.4* 15.1*        Chemistries:  Recent Labs   Lab 01/10/19  0315 01/10/19  0316 01/11/19  0200   NA  --  139 141   K  --  3.8 3.5   CL  --  98 100   CO2  --  27 31*   BUN  --  24* 25*   CREATININE  --  1.5* 1.5*   CALCIUM  --  8.8 8.8   MG 2.0  --  2.2   PHOS 4.3  --  3.4       Significant Imaging:  I have reviewed all pertinent imaging results/findings within the past 24 hours.    Assessment/Plan:     * Acute respiratory failure with hypoxia    Multifactorial.. mixed clinical picture as persistent radiographic infiltrates, febrile on admission, purulent sputum.. However, Procal normal x 2, respiratory cultures unrevealing and profound BiV failure/NICM... Extubated yesterday without issue.     --  Plan to complete 7 day course of abx for PNA/aspiration pneumonitis.  --  Diuretics to maintain negative fluid balance  -- GDMT for HFrEF as outline above   -- Wean supplemental O2 to maintain SpO2>88%   -- Aggressive pulmonary  toilet/cough encouragement.. Add Mucous clearance device/bronchodilators  -- Aggressive PT  -- NIPPV prn and QHS      Encephalopathy, metabolic    --2/2 sedation.   -- Resolved   -- Maintain delirium precautions.      Acute on chronic combined systolic and diastolic congestive heart failure    --appreciate cardiology assistance  -- Dobutamine weaned to off. Re-introducing GDMT today..  -- Will need to discuss timing of BIV placement.          DVT PPX- Lovenox   Nutrition- Advance PO diet   ICU care- Establish PIV.. Remove central line..     OK to step out of ICU from my standpoint. Please call if any issues. Consult appreciated.      Ronan Vazquez MD  Pulmonary/Critical Care Medicine   [facility] [place]

## 2019-01-11 NOTE — PT/OT/SLP EVAL
Physical Therapy Evaluation    Patient Name:  Papito Bhakta   MRN:  [id number]    Recommendations:     Discharge Recommendations:  (home health PT services with family assistance)   Discharge Equipment Recommendations: (TBD)   Barriers to discharge: None    Assessment:     Papito Bhakta is a 63 y.o. male admitted with a medical diagnosis of Acute respiratory failure with hypoxia.  He presents with the following impairments/functional limitations:  weakness, impaired endurance, impaired functional mobilty, gait instability, impaired balance, decreased lower extremity function, pain, impaired cardiopulmonary response to activity.    Rehab Prognosis: Good; patient would benefit from acute skilled PT services to address these deficits and reach maximum level of function.    Recent Surgery: * No surgery found *      Plan:     During this hospitalization, patient to be seen daily to address the identified rehab impairments via gait training, therapeutic activities, therapeutic exercises and progress toward the following goals:    · Plan of Care Expires:  01/25/19    Subjective     Chief Complaint: R heel pain  Patient/Family Comments/goals: to get his medicine from home for his R heel pain  Pain/Comfort:  Pain Rating 1: (Pt c/o R heel pain.)    Living Environment:  Pt lives with daughter in a  house with no concerns.   Prior to admission, patients level of function was independent and driving.  Equipment used at home: none.   Upon discharge, patient will have assistance from daughter.    Objective:     Patient found in bed with all lines intact and call button in reach oxygen, peripheral IV, telemetry upon PT entry to room.    General Precautions: Standard, fall, respiratory   Orthopedic Precautions:N/A   Braces: N/A     Exams:  · Cognitive Exam:  Patient was able to follow multiple commands.   · Gross Motor Coordination:  WFL  · Postural Exam:  Patient presented with the following abnormalities:    · -       Rounded  shoulders  · Sensation:    · -       Intact  light/touch BLE, c/o numbness to certain parts of his feet  · Skin Integrity/Edema:      · -       Skin integrity: Visible skin intact  · -       Edema: Mild BLE  · BLE ROM: WFL  · BLE Strength: WFL    Functional Mobility:  · Bed Mobility:     · Scooting: stand by assistance  · Supine to Sit: minimum assistance with BLE  · Transfers:     · Sit to Stand:  contact guard assistance with rolling walker and bed elevated   · Gait: Pt ambulated ~4-5 sidesteps along bedside with CGA using RW and 2L O2 NC, gait limited 2* R heel pain.  Pt with decreased weight shifting, foot clearance, and step length.  Pt appeared to be SOB.    · Balance: Pt with fair+ balance.       AM-PAC 6 CLICK MOBILITY  Total Score:18     Patient left seated EOB with all lines intact, call button in reach and nurse Janett (for nurse Edith) notified.  Dinner tray set up for pt.      GOALS:   Multidisciplinary Problems     Physical Therapy Goals        Problem: Physical Therapy Goal    Goal Priority Disciplines Outcome Goal Variances Interventions   Physical Therapy Goal     PT, PT/OT      Description:  Goals to be met by: 19     Patient will increase functional independence with mobility by performin. Supine to sit with Modified [place]  2. Rolling to Left and Right with Modified [place]  3. Sit to stand transfer with Modified [place] using RW  4. Bed to chair transfer with Modified [place] using Rolling Walker  5. Gait  x 250 feet with Modified [place] using Rolling Walker   6. Lower extremity exercise program 3 sets x10 reps per handout, with independence                      History:     Past Medical History:   Diagnosis Date    CHF (congestive heart failure)     Hyperlipidemia     Hypertension        Past Surgical History:   Procedure Laterality Date    Heart Cath-Bilateral Bilateral 2018    Performed [facility] Shail[facility] Eng [facility] at [facility] CATH LAB    TESTICLE  SURGERY         Clinical Decision Making:     History  Co-morbidities and personal factors that may impact the plan of care Examination  Body Structures and Functions, activity limitations and participation restrictions that may impact the plan of care Clinical Presentation   Decision Making/ Complexity Score   Co-morbidities:   [] Time since onset of injury / illness / exacerbation  [x] Status of current condition  []Patient's cognitive status and safety concerns    [x] Multiple Medical Problems (see med hx)  Personal Factors:   [] Patient's age  [] Prior Level of function   [] Patient's home situation (environment and family support)  [] Patient's level of motivation  [] Expected progression of patient      HISTORY:(criteria)    [] 40108 - no personal factors/history    [x] 21749 - has 1-2 personal factor/comorbidity     [] 71917 - has >3 personal factor/comorbidity     Body Regions:  [x] Objective examination findings  [] Head     []  Neck  [] Trunk   [] Upper Extremity  [x] Lower Extremity    Body Systems:  [x] For communication ability, affect, cognition, language, and learning style: the assessment of the ability to make needs known, consciousness, orientation (person, place, and time), expected emotional /behavioral responses, and learning preferences (eg, learning barriers, education  needs)  [x] For the neuromuscular system: a general assessment of gross coordinated movement (eg, balance, gait, locomotion, transfers, and transitions) and motor function  (motor control and motor learning)  [x] For the musculoskeletal system: the assessment of gross symmetry, gross range of motion, gross strength, height, and weight  [] For the integumentary system: the assessment of pliability(texture), presence of scar formation, skin color, and skin integrity  [x] For cardiovascular/pulmonary system: the assessment of heart rate, respiratory rate, blood pressure, and edema     Activity limitations:    [] Patient's  cognitive status and saf ety concerns          [x] Status of current condition      [] Weight bearing restriction  [x] Cardiopulmunary Restriction    Participation Restrictions:   [] Goals and goal agreement with the patient     [] Rehab potential (prognosis) and probable outcome      Examination of Body System: (criteria)    [] 69021 - addressing 1-2 elements    [] 78718 - addressing a total of 3 or more elements     [x] 46246 -  Addressing a total of 4 or more elements         Clinical Presentation: (criteria)  Evolving - 84067     On examination of body system using standardized tests and measures patient presents with 4 or more elements from any of the following: body structures and functions, activity limitations, and/or participation restrictions.  Leading to a clinical presentation that is considered evolving with changing characteristics                              Clinical Decision Making  (Eval Complexity):  Moderate - 15160     Time Tracking:     PT Received On: 01/11/19  PT Start Time: 1647     PT Stop Time: 1702  PT Total Time (min): 15 min     Billable Minutes: Evaluation 15 min co-eval with LILIAN Hebert, PT  01/11/2019

## 2019-01-11 NOTE — ASSESSMENT & PLAN NOTE
Held home Entresto and Coreg for shock requiring Levophed. Given IV Lasix for diuresis.  Able to wean off Levophed.  Follow Cards input.

## 2019-01-11 NOTE — PLAN OF CARE
Problem: Occupational Therapy Goal  Goal: Occupational Therapy Goal  Goals to be met by: 1/18/2019     Patient will increase functional independence with ADLs by performing:    UE Dressing with Modified [place].  LE Dressing with Set-up Assistance.  Grooming while standing at sink with Stand-by Assistance.  Toileting from bedside commode with Set-up Assistance for hygiene and clothing management.   Step transfer with Contact Guard Assistance with AE  Toilet transfer to bedside commode with Contact Guard Assistance.  Upper extremity exercise program with assistance as needed.    Outcome: Ongoing (interventions implemented as appropriate)  Patient tolerated evaluation well, good participation.  Patient will benefit from skilled OT services to address the above mentioned goals. KAELA Sadler, MS

## 2019-01-11 NOTE — PROGRESS NOTES
Pt received intubated and on the Servo i vent with the following settings:  PRVC 16, Vt 540, Peep +8, FIO2 35%. ETT 7.5 @ 24cm.    Ambu bag and mask at [facility]. All alarms are set and functioning. Will continue to monitor and wean as tolerated.

## 2019-01-12 LAB
ANION GAP SERPL CALC-SCNC: 8 MMOL/L
ANISOCYTOSIS BLD QL SMEAR: SLIGHT
BASOPHILS # BLD AUTO: 0.05 K/UL
BASOPHILS NFR BLD: 0.5 %
BUN SERPL-MCNC: 31 MG/DL
CALCIUM SERPL-MCNC: 9 MG/DL
CHLORIDE SERPL-SCNC: 99 MMOL/L
CO2 SERPL-SCNC: 32 MMOL/L
CREAT SERPL-MCNC: 1.7 MG/DL
DIFFERENTIAL METHOD: ABNORMAL
EOSINOPHIL # BLD AUTO: 0.2 K/UL
EOSINOPHIL NFR BLD: 2.2 %
ERYTHROCYTE [DISTWIDTH] IN BLOOD BY AUTOMATED COUNT: 15.4 %
EST. GFR  (AFRICAN AMERICAN): 49 ML/MIN/1.73 M^2
EST. GFR  (NON AFRICAN AMERICAN): 42 ML/MIN/1.73 M^2
GLUCOSE SERPL-MCNC: 94 MG/DL
HCT VFR BLD AUTO: 34.6 %
HGB BLD-MCNC: 11.3 G/DL
LYMPHOCYTES # BLD AUTO: 1.4 K/UL
LYMPHOCYTES NFR BLD: 13.7 %
MAGNESIUM SERPL-MCNC: 2.3 MG/DL
MCH RBC QN AUTO: 25.7 PG
MCHC RBC AUTO-ENTMCNC: 32.7 G/DL
MCV RBC AUTO: 79 FL
MONOCYTES # BLD AUTO: 1.4 K/UL
MONOCYTES NFR BLD: 13 %
NEUTROPHILS # BLD AUTO: 7.4 K/UL
NEUTROPHILS NFR BLD: 71.5 %
PHOSPHATE SERPL-MCNC: 3.7 MG/DL
PLATELET # BLD AUTO: 320 K/UL
PLATELET BLD QL SMEAR: ABNORMAL
PMV BLD AUTO: 10.5 FL
POTASSIUM SERPL-SCNC: 3.6 MMOL/L
RBC # BLD AUTO: 4.39 M/UL
SODIUM SERPL-SCNC: 139 MMOL/L
WBC # BLD AUTO: 10.45 K/UL

## 2019-01-12 PROCEDURE — 84100 ASSAY OF PHOSPHORUS: CPT | Mod: HCNC

## 2019-01-12 PROCEDURE — 21400001 HC TELEMETRY ROOM: Mod: HCNC

## 2019-01-12 PROCEDURE — 83735 ASSAY OF MAGNESIUM: CPT | Mod: HCNC

## 2019-01-12 PROCEDURE — 97110 THERAPEUTIC EXERCISES: CPT | Mod: HCNC

## 2019-01-12 PROCEDURE — 63600175 PHARM REV CODE 636 W HCPCS: Mod: HCNC | Performed by: EMERGENCY MEDICINE

## 2019-01-12 PROCEDURE — 25000003 PHARM REV CODE 250: Mod: HCNC | Performed by: EMERGENCY MEDICINE

## 2019-01-12 PROCEDURE — 97530 THERAPEUTIC ACTIVITIES: CPT | Mod: HCNC

## 2019-01-12 PROCEDURE — 99232 SBSQ HOSP IP/OBS MODERATE 35: CPT | Mod: GT,HCNC,, | Performed by: INTERNAL MEDICINE

## 2019-01-12 PROCEDURE — 25000003 PHARM REV CODE 250: Mod: HCNC | Performed by: NURSE PRACTITIONER

## 2019-01-12 PROCEDURE — 97116 GAIT TRAINING THERAPY: CPT | Mod: HCNC

## 2019-01-12 PROCEDURE — 63600175 PHARM REV CODE 636 W HCPCS: Mod: HCNC | Performed by: NURSE PRACTITIONER

## 2019-01-12 PROCEDURE — 25000003 PHARM REV CODE 250: Mod: HCNC | Performed by: HOSPITALIST

## 2019-01-12 PROCEDURE — 25000003 PHARM REV CODE 250: Mod: HCNC | Performed by: INTERNAL MEDICINE

## 2019-01-12 PROCEDURE — 94761 N-INVAS EAR/PLS OXIMETRY MLT: CPT | Mod: HCNC

## 2019-01-12 PROCEDURE — 99232 PR SUBSEQUENT HOSPITAL CARE,LEVL II: ICD-10-PCS | Mod: GT,HCNC,, | Performed by: INTERNAL MEDICINE

## 2019-01-12 PROCEDURE — 80048 BASIC METABOLIC PNL TOTAL CA: CPT | Mod: HCNC

## 2019-01-12 PROCEDURE — 25000242 PHARM REV CODE 250 ALT 637 W/ HCPCS: Mod: HCNC | Performed by: HOSPITALIST

## 2019-01-12 PROCEDURE — 36415 COLL VENOUS BLD VENIPUNCTURE: CPT | Mod: HCNC

## 2019-01-12 PROCEDURE — 94640 AIRWAY INHALATION TREATMENT: CPT | Mod: HCNC

## 2019-01-12 PROCEDURE — 94660 CPAP INITIATION&MGMT: CPT | Mod: HCNC

## 2019-01-12 PROCEDURE — 27000221 HC OXYGEN, UP TO 24 HOURS: Mod: HCNC

## 2019-01-12 PROCEDURE — 85025 COMPLETE CBC W/AUTO DIFF WBC: CPT | Mod: HCNC

## 2019-01-12 PROCEDURE — 99900035 HC TECH TIME PER 15 MIN (STAT): Mod: HCNC

## 2019-01-12 RX ORDER — RAMIPRIL 2.5 MG/1
2.5 CAPSULE ORAL DAILY
Status: DISCONTINUED | OUTPATIENT
Start: 2019-01-12 | End: 2019-01-13 | Stop reason: HOSPADM

## 2019-01-12 RX ORDER — CARVEDILOL 3.12 MG/1
3.12 TABLET ORAL 2 TIMES DAILY
Status: DISCONTINUED | OUTPATIENT
Start: 2019-01-12 | End: 2019-01-13 | Stop reason: HOSPADM

## 2019-01-12 RX ORDER — SPIRONOLACTONE 25 MG/1
25 TABLET ORAL DAILY
Status: DISCONTINUED | OUTPATIENT
Start: 2019-01-12 | End: 2019-01-13 | Stop reason: HOSPADM

## 2019-01-12 RX ADMIN — PRAVASTATIN SODIUM 80 MG: 40 TABLET ORAL at 08:01

## 2019-01-12 RX ADMIN — DOCUSATE SODIUM AND SENNOSIDES 1 TABLET: 8.6; 5 TABLET, FILM COATED ORAL at 09:01

## 2019-01-12 RX ADMIN — DOCUSATE SODIUM AND SENNOSIDES 1 TABLET: 8.6; 5 TABLET, FILM COATED ORAL at 08:01

## 2019-01-12 RX ADMIN — ASPIRIN 325 MG ORAL TABLET 325 MG: 325 PILL ORAL at 08:01

## 2019-01-12 RX ADMIN — ENOXAPARIN SODIUM 40 MG: 100 INJECTION SUBCUTANEOUS at 05:01

## 2019-01-12 RX ADMIN — OXYCODONE AND ACETAMINOPHEN 1 TABLET: 5; 325 TABLET ORAL at 10:01

## 2019-01-12 RX ADMIN — CARVEDILOL 3.12 MG: 3.12 TABLET, FILM COATED ORAL at 09:01

## 2019-01-12 RX ADMIN — LISINOPRIL 10 MG: 5 TABLET ORAL at 08:01

## 2019-01-12 RX ADMIN — IPRATROPIUM BROMIDE AND ALBUTEROL SULFATE 3 ML: .5; 3 SOLUTION RESPIRATORY (INHALATION) at 07:01

## 2019-01-12 RX ADMIN — IPRATROPIUM BROMIDE AND ALBUTEROL SULFATE 3 ML: .5; 3 SOLUTION RESPIRATORY (INHALATION) at 01:01

## 2019-01-12 RX ADMIN — RAMIPRIL 2.5 MG: 2.5 CAPSULE ORAL at 12:01

## 2019-01-12 RX ADMIN — CEFEPIME HYDROCHLORIDE 1 G: 1 INJECTION, SOLUTION INTRAVENOUS at 02:01

## 2019-01-12 RX ADMIN — CEFEPIME HYDROCHLORIDE 1 G: 1 INJECTION, SOLUTION INTRAVENOUS at 10:01

## 2019-01-12 RX ADMIN — FUROSEMIDE 40 MG: 40 TABLET ORAL at 08:01

## 2019-01-12 RX ADMIN — SPIRONOLACTONE 25 MG: 25 TABLET ORAL at 08:01

## 2019-01-12 RX ADMIN — COLCHICINE 0.6 MG: 0.6 TABLET, FILM COATED ORAL at 08:01

## 2019-01-12 RX ADMIN — CEFEPIME HYDROCHLORIDE 1 G: 1 INJECTION, SOLUTION INTRAVENOUS at 04:01

## 2019-01-12 NOTE — NURSING
Bedside report given to night nurse. Pt on 3L NC, has no sign of distress. IV site clean, dry, and intact. Safety precautions are maintained with bed alarm set, bed in lowest position, bed wheels locked, and call light in reach. Chart check completed.

## 2019-01-12 NOTE — ASSESSMENT & PLAN NOTE
Empiric abx with Vanc/Cefepime. Resp Cx polymicrobial.   Cultures negative.  Vanc stopped and continued on Cefepime.daljit for 7 days,one day more.

## 2019-01-12 NOTE — SUBJECTIVE & OBJECTIVE
Interval History:     Review of Systems   Constitution: Negative for decreased appetite.   HENT: Negative for ear discharge.    Eyes: Negative for blurred vision.   Endocrine: Negative for polyphagia.   Skin: Negative for nail changes.   Neurological: Negative for aphonia.   Psychiatric/Behavioral: Negative for hallucinations.     Objective:     Vital Signs (Most Recent):  Temp: 97.8 °F (36.6 °C) (01/12/19 1120)  Pulse: 74 (01/12/19 1120)  Resp: 18 (01/12/19 1120)  BP: (!) 106/57 (01/12/19 1120)  SpO2: (!) 94 % (01/12/19 1120) Vital Signs (24h Range):  Temp:  [97.6 °F (36.4 °C)-98.6 °F (37 °C)] 97.8 °F (36.6 °C)  Pulse:  [60-79] 74  Resp:  [18-35] 18  SpO2:  [94 %-100 %] 94 %  BP: ()/(56-69) 106/57     Weight: (!) 141.1 kg (311 lb)  Body mass index is 36.88 kg/m².     SpO2: (!) 94 %  O2 Device (Oxygen Therapy): room air      Intake/Output Summary (Last 24 hours) at 1/12/2019 1200  Last data filed at 1/12/2019 0435  Gross per 24 hour   Intake 570 ml   Output 500 ml   Net 70 ml       Lines/Drains/Airways     Peripheral Intravenous Line                 Peripheral IV - Single Lumen 01/11/19 1400 Right;Posterior Hand less than 1 day                Physical Exam   Constitutional: He is oriented to person, place, and time. No distress.   HENT:   Head: Normocephalic and atraumatic.   Eyes: Conjunctivae and EOM are normal. Pupils are equal, round, and reactive to light.   Neck: Normal range of motion. Neck supple.   Cardiovascular: Normal rate and regular rhythm.   Pulmonary/Chest: Effort normal and breath sounds normal.   Abdominal: Soft. Bowel sounds are normal.   Musculoskeletal: He exhibits no edema.   Neurological: He is alert and oriented to person, place, and time.   Skin: Skin is warm and dry.   Psychiatric: He has a normal mood and affect. His behavior is normal.       Significant Labs: All pertinent lab results from the last 24 hours have been reviewed.    Significant Imaging: Echocardiogram:   2D echo with  color flow doppler:   Results for orders placed or performed during the hospital encounter of 01/24/18   2D echo with color flow doppler   Result Value Ref Range    QEF 15 (A) 55 - 65    Mitral Valve Regurgitation MILD     Diastolic Dysfunction Yes (A)     Est. PA Systolic Pressure 26.81     Pericardial Effusion TRIVIAL

## 2019-01-12 NOTE — PLAN OF CARE
Problem: Fall Injury Risk  Goal: Absence of Fall and Fall-Related Injury  Outcome: Ongoing (interventions implemented as appropriate)  Intervention: Identify and Manage Contributors to Fall Injury Risk   01/12/19 0432   Manage Acute Allergic Reaction   Medication Review/Management medications reviewed;high risk medications identified   Identify and Manage Contributors to Fall Injury Risk   Self-Care Promotion BADL personal objects within reach;BADL personal routines maintained     Intervention: Promote Injury-Free Environment   01/12/19 0432   Optimize [place] and Functional Mobility   Environmental Safety Modification assistive device/personal items within reach;clutter free environment maintained;lighting adjusted;room organization consistent   Optimize Balance and Safe Activity   Safety Promotion/Fall Prevention assistive device/personal item within reach;bed alarm set;Fall Risk reviewed with patient/family;medications reviewed;side rails raised x 3;instructed to call staff for mobility         Problem: Adult Inpatient Plan of Care  Goal: Plan of Care Review   01/12/19 0432   Plan of Care Review   Plan of Care Reviewed With patient

## 2019-01-12 NOTE — PROGRESS NOTES
[facility]  Cardiology  Progress Note    Patient Name: Papito Bhakta  MRN: [id number]  Admission Date: 1/5/2019  Hospital Length of Stay: 7 days  Code Status: Full Code   Attending Physician: Jihan Urias MD   Primary Care Physician: Brynn To MD  Expected Discharge Date:   Principal Problem:Acute respiratory failure with hypoxia    Subjective:     Hospital Course:   1-7:  Intubated post respiratory failure  1-8:  Started on dobutamine infusion  1-9:  Failed SBT  1-11 Extubated. Denies CP or SOB  1-13 Denies CP or SOB, wants to go home    Interval History:     Review of Systems   Constitution: Negative for decreased appetite.   HENT: Negative for ear discharge.    Eyes: Negative for blurred vision.   Endocrine: Negative for polyphagia.   Skin: Negative for nail changes.   Neurological: Negative for aphonia.   Psychiatric/Behavioral: Negative for hallucinations.     Objective:     Vital Signs (Most Recent):  Temp: 97.8 °F (36.6 °C) (01/12/19 1120)  Pulse: 74 (01/12/19 1120)  Resp: 18 (01/12/19 1120)  BP: (!) 106/57 (01/12/19 1120)  SpO2: (!) 94 % (01/12/19 1120) Vital Signs (24h Range):  Temp:  [97.6 °F (36.4 °C)-98.6 °F (37 °C)] 97.8 °F (36.6 °C)  Pulse:  [60-79] 74  Resp:  [18-35] 18  SpO2:  [94 %-100 %] 94 %  BP: ()/(56-69) 106/57     Weight: (!) 141.1 kg (311 lb)  Body mass index is 36.88 kg/m².     SpO2: (!) 94 %  O2 Device (Oxygen Therapy): room air      Intake/Output Summary (Last 24 hours) at 1/12/2019 1200  Last data filed at 1/12/2019 0435  Gross per 24 hour   Intake 570 ml   Output 500 ml   Net 70 ml       Lines/Drains/Airways     Peripheral Intravenous Line                 Peripheral IV - Single Lumen 01/11/19 1400 Right;Posterior Hand less than 1 day                Physical Exam   Constitutional: He is oriented to person, place, and time. No distress.   HENT:   Head: Normocephalic and atraumatic.   Eyes: Conjunctivae and EOM are normal. Pupils are equal, round, and  reactive to light.   Neck: Normal range of motion. Neck supple.   Cardiovascular: Normal rate and regular rhythm.   Pulmonary/Chest: Effort normal and breath sounds normal.   Abdominal: Soft. Bowel sounds are normal.   Musculoskeletal: He exhibits no edema.   Neurological: He is alert and oriented to person, place, and time.   Skin: Skin is warm and dry.   Psychiatric: He has a normal mood and affect. His behavior is normal.       Significant Labs: All pertinent lab results from the last 24 hours have been reviewed.    Significant Imaging: Echocardiogram:   2D echo with color flow doppler:   Results for orders placed or performed during the hospital encounter of 01/24/18   2D echo with color flow doppler   Result Value Ref Range    QEF 15 (A) 55 - 65    Mitral Valve Regurgitation MILD     Diastolic Dysfunction Yes (A)     Est. PA Systolic Pressure 26.81     Pericardial Effusion TRIVIAL      Assessment and Plan:     Brief HPI:     * Acute respiratory failure with hypoxia    Stable post extubation 1-10     Hyperlipidemia    Statin     Acute on chronic combined systolic and diastolic congestive heart failure    Severe biventricular dysfunction  Previously had refused biventricular ICD  Stable on low-dose dobutamine - d/c today  Add aldactone and ACE-I  Add coreg tomorrow  Ok for telemetry       Essential hypertension    All meds held with hypotension         VTE Risk Mitigation (From admission, onward)        Ordered     enoxaparin injection 40 mg  Daily      01/05/19 1036     IP VTE HIGH RISK PATIENT  Once      01/05/19 1037         OK for d/c  Cardiology follow up 1 week    Ron Kaplan MD  Cardiology  [facility]

## 2019-01-12 NOTE — SUBJECTIVE & OBJECTIVE
Interval History: Doing well,stable on NC O 2.    Review of Systems   Constitutional: Negative for chills and fever.   HENT: Negative for ear discharge and ear pain.    Eyes: Negative for pain and itching.   Neurological: Negative for seizures and syncope.     Objective:     Vital Signs (Most Recent):  Temp: 97.8 °F (36.6 °C) (01/12/19 1120)  Pulse: 74 (01/12/19 1120)  Resp: 18 (01/12/19 1120)  BP: (!) 106/57 (01/12/19 1120)  SpO2: (!) 94 % (01/12/19 1120) Vital Signs (24h Range):  Temp:  [97.6 °F (36.4 °C)-98.6 °F (37 °C)] 97.8 °F (36.6 °C)  Pulse:  [60-80] 74  Resp:  [18-35] 18  SpO2:  [94 %-100 %] 94 %  BP: ()/(56-69) 106/57     Weight: (!) 141.1 kg (311 lb)  Body mass index is 36.88 kg/m².    Intake/Output Summary (Last 24 hours) at 1/12/2019 1122  Last data filed at 1/12/2019 0435  Gross per 24 hour   Intake 620 ml   Output 500 ml   Net 120 ml      Physical Exam   Constitutional: He is oriented to person, place, and time. He appears well-developed and well-nourished.   HENT:   Right Ear: External ear normal.   Left Ear: External ear normal.   Nose: Nose normal.   Eyes: Conjunctivae are normal. Right eye exhibits no discharge.   Cardiovascular: Normal rate and normal heart sounds.   No murmur heard.  Pulmonary/Chest: Effort normal. He has no wheezes.   Coarse particularly over right base, less so than previously   Abdominal: Soft. Bowel sounds are normal. He exhibits no distension. There is no tenderness.   Musculoskeletal: He exhibits edema (much improved from admission). He exhibits no deformity.   Neurological: He is alert and oriented to person, place, and time.   Skin: Skin is warm and dry.       Significant Labs:   BMP:   Recent Labs   Lab 01/12/19  0626   GLU 94      K 3.6   CL 99   CO2 32*   BUN 31*   CREATININE 1.7*   CALCIUM 9.0   MG 2.3     CBC:   Recent Labs   Lab 01/11/19  0200 01/12/19  0626   WBC 7.76 10.45   HGB 14.2 11.3*   HCT 44.3 34.6*    320

## 2019-01-12 NOTE — ASSESSMENT & PLAN NOTE
EF of 20% and grade 3 diastolic CHF on echo on 11/2018.was on  Diurese with IV Lasix. Hold BB/Entresto while hypotensive.  Was Started on Dobutamine per Cards.  Slight increase in Creat.  Now off Dobutamine gtt.  Lasix switched to oral.  Was on Lisinopril and Aldactone.  hold diuretic today for rising CRT,he is stable on NC O 2.

## 2019-01-12 NOTE — PLAN OF CARE
Problem: Physical Therapy Goal  Goal: Physical Therapy Goal  Goals to be met by: 19     Patient will increase functional independence with mobility by performin. Supine to sit with Modified [place]  2. Rolling to Left and Right with Modified [place]  3. Sit to stand transfer with Modified [place] using RW  4. Bed to chair transfer with Modified [place] using Rolling Walker  5. Gait  x 250 feet with Modified [place] using Rolling Walker   6. Lower extremity exercise program 3 sets x10 reps per handout, with independence     Outcome: Ongoing (interventions implemented as appropriate)  Pt required extra time to complete tasks 2* R heel pain. Pt ambulated ~ 12 ft with RW, CGA . Limited with gait training 2* fatigue and R heel pain . Pt pre-meded for activity . Pt will benefit from further therapy in order to get back to PLOF.

## 2019-01-12 NOTE — PT/OT/SLP PROGRESS
Physical Therapy Treatment    Patient Name:  Papito Bhakta   MRN:  [id number]    Recommendations:     Discharge Recommendations:  (home health PT services with family assistance)   Discharge Equipment Recommendations: (TBD)   Barriers to discharge: None    Assessment:     Papito Bhakta is a 63 y.o. male admitted with a medical diagnosis of Acute respiratory failure with hypoxia.  He presents with the following impairments/functional limitations:  weakness, impaired endurance, impaired self care skills, gait instability, impaired functional mobilty, impaired balance, decreased upper extremity function, decreased lower extremity function, decreased ROM, decreased safety awareness, pain, impaired cardiopulmonary response to activity, edema . Pt required extra time to complete tasks 2* R heel pain. Pt ambulated ~ 12 ft with RW, CGA . Limited with gait training 2* fatigue and R heel pain . Pt pre-meded for activity . Pt will benefit from further therapy in order to get back to PLOF.     Rehab Prognosis: Fair +; patient would benefit from acute skilled PT services to address these deficits and reach maximum level of function.    Recent Surgery: * No surgery found *      Plan:     During this hospitalization, patient to be seen daily to address the identified rehab impairments via gait training, therapeutic activities, therapeutic exercises and progress toward the following goals:    · Plan of Care Expires:  01/25/19    Subjective     Chief Complaint: R heel pain   Patient/Family Comments/goals: to get back to PLOF.   Pain/Comfort:  · Pain Rating 1: 6/10  · Location - Side 1: Right  · Location 1: heel  · Pain Addressed 1: Pre-medicate for activity, Nurse notified  · Pain Rating Post-Intervention 1: 6/10      Objective:     Communicated with nurse James prior to session.  Patient found all lines intact, call button in reach and bed alarm on bed alarm, telemetry, oxygen  upon PT entry to room.     General Precautions:  Standard, fall, respiratory   Orthopedic Precautions:N/A   Braces: N/A     Functional Mobility:  · Bed Mobility:     · Rolling Left:  stand by assistance  · Rolling Right: stand by assistance  · Scooting: stand by assistance  · Bridging: stand by assistance  · Supine to Sit: stand by assistance, HOB elevated, bedside rail   · Sit to Supine: contact guard assistance  · Transfers:     · Sit to Stand:  X 3 trials from bed contact guard assistance with rolling walker. VC's for safety technique and walker management.   · Gait:  Pt required extra time to complete tasks 2* R heel pain. Pt ambulated ~ 12 ft with RW, CGA (3L O2NC ) . Limited with gait training 2* fatigue and R heel pain . Pt pre-meded for activity . Pt with decreased roc, decreased step length , decreased weight shifting ability , decreased foot clearing. V/T cues for safety technique and walker management. VC's for proper breathing technique.       AM-PAC 6 CLICK MOBILITY  Turning over in bed (including adjusting bedclothes, sheets and blankets)?: 4  Sitting down on and standing up from a chair with arms (e.g., wheelchair, bedside commode, etc.): 3  Moving from lying on back to sitting on the side of the bed?: 4  Moving to and from a bed to a chair (including a wheelchair)?: 3  Need to walk in hospital room?: 3  Climbing 3-5 steps with a railing?: 3  Basic Mobility Total Score: 20       Therapeutic Activities and Exercises:   pt tolerated standing balance using RW, CGA to get bed pad changed.   Pt performed seated BLE x ~ 15 reps : AP, LAQ, HS, hip flexion and pillow squeezes. V/T cues for proper technique and sequence.  Pt required frequent rest breaks during activity 2* fatigue.   Encouraged pt to perform BLE ex's while in bed throughout the day. Pt verbalize understanding.     Patient left with bed in chair position BLE elevated with all lines intact, call button in reach, bed alarm on and nurse notified..    GOALS:   Multidisciplinary Problems      Physical Therapy Goals        Problem: Physical Therapy Goal    Goal Priority Disciplines Outcome Goal Variances Interventions   Physical Therapy Goal     PT, PT/OT Ongoing (interventions implemented as appropriate)     Description:  Goals to be met by: 19     Patient will increase functional independence with mobility by performin. Supine to sit with Modified [place]  2. Rolling to Left and Right with Modified [place]  3. Sit to stand transfer with Modified [place] using RW  4. Bed to chair transfer with Modified [place] using Rolling Walker  5. Gait  x 250 feet with Modified [place] using Rolling Walker   6. Lower extremity exercise program 3 sets x10 reps per handout, with independence                      Time Tracking:     PT Received On: 19  PT Start Time: 1120     PT Stop Time: 1158  PT Total Time (min): 38 min     Billable Minutes: Gait Training 14, Therapeutic Activity 12 and Therapeutic Exercise 12    Treatment Type: Treatment  PT/PTA: PTA     PTA Visit Number: 1     Tram T Edyta, PTA  2019

## 2019-01-12 NOTE — ASSESSMENT & PLAN NOTE
Agitated and required sedation as he was reaching for ET tube. 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 Weaned sedation with improvement in mental status on 1/7/2019.  Resolved.

## 2019-01-12 NOTE — PLAN OF CARE
Problem: Adult Inpatient Plan of Care  Goal: Absence of Hospital-Acquired Illness or Injury    Intervention: Prevent Skin Injury  Patient received on nasal cannulla 2.5 lpm. Aerosol treatment given. Placed on bipap 12/5, rate 12, FI02 30%. No facial skin breakdown noted.

## 2019-01-12 NOTE — PROGRESS NOTES
"[facility]  Progress Note    Patient Name: Papito Bhakta  MRN: [id number]  Patient Class: IP- Inpatient   Admission Date: 1/5/2019  Length of Stay: 7 days  Attending Physician: Jihan Urias MD  Primary Care Provider: Brynn To MD        Subjective:     Principal Problem:Acute respiratory failure with hypoxia    HPI:  Mr. Bhakta is a 64 yo man with combined HF (EF 20%, G3DD), AICD in place, Hyperlipidemia, and Hypertension who presented to the ED c/o gradually worsening and severe (10/10) SOB with associated bilateral lower extremity swelling x1 week. He also reports a hematemesis and productive cough with sputum described as "like grits." He is not on supplementary O2 at home. He is compliant with his Lasix 80mg x2/day. He denies fever, chills, diaphoresis, nausea, emesis, diarrhea, abdominal pain, chest pain, back pain, difficulty urinating and rash. Patient was hypoxic upon arrival to 80%. He was started on supplemental oxygen via BiPAP. CXR w/ acute pulmonary edema. He was started on IV Lasix. He claims to be complaint with a low salt diet and Lasix.    Hospital Course:  Mr. Bhakta was admitted for respiratory failure thought to be due to CHF exacerbation. He was started on BiPAP and diuresis. He had no leukocytosis or fever at the time of presentation. CXR was consistent with pulmonary edema without evidence of consolidation on presentation. He had worsening respiratory failure the night following admission and required intubation. Upon intubation he was found to have a copious amount of thick, yellow sputum that was almost appearing purulent. He had been started on broad spectrum antibiotics with cefepime and vancomycin. Resp stain with resp yung but NGTD. Blood cultures remain NGTD.  Episodes of Vtach and Cardiology consulted.  Started on Dobutamine for better diuresis per Cards.  Patient extubated on 1/10 without further respiratory issues.  He has been " continued on Cefepime.need for 7 days,one day more,  Dobutamine drip stopped and Lasix switched to oral.  Medications adjustments per Cards.hold diuretic today for rising CRT,he is stable on NC O 2.    Interval History: Doing well,stable on NC O 2.    Review of Systems   Constitutional: Negative for chills and fever.   HENT: Negative for ear discharge and ear pain.    Eyes: Negative for pain and itching.   Neurological: Negative for seizures and syncope.     Objective:     Vital Signs (Most Recent):  Temp: 97.8 °F (36.6 °C) (01/12/19 1120)  Pulse: 74 (01/12/19 1120)  Resp: 18 (01/12/19 1120)  BP: (!) 106/57 (01/12/19 1120)  SpO2: (!) 94 % (01/12/19 1120) Vital Signs (24h Range):  Temp:  [97.6 °F (36.4 °C)-98.6 °F (37 °C)] 97.8 °F (36.6 °C)  Pulse:  [60-80] 74  Resp:  [18-35] 18  SpO2:  [94 %-100 %] 94 %  BP: ()/(56-69) 106/57     Weight: (!) 141.1 kg (311 lb)  Body mass index is 36.88 kg/m².    Intake/Output Summary (Last 24 hours) at 1/12/2019 1122  Last data filed at 1/12/2019 0435  Gross per 24 hour   Intake 620 ml   Output 500 ml   Net 120 ml      Physical Exam   Constitutional: He is oriented to person, place, and time. He appears well-developed and well-nourished.   HENT:   Right Ear: External ear normal.   Left Ear: External ear normal.   Nose: Nose normal.   Eyes: Conjunctivae are normal. Right eye exhibits no discharge.   Cardiovascular: Normal rate and normal heart sounds.   No murmur heard.  Pulmonary/Chest: Effort normal. He has no wheezes.   Coarse particularly over right base, less so than previously   Abdominal: Soft. Bowel sounds are normal. He exhibits no distension. There is no tenderness.   Musculoskeletal: He exhibits edema (much improved from admission). He exhibits no deformity.   Neurological: He is alert and oriented to person, place, and time.   Skin: Skin is warm and dry.       Significant Labs:   BMP:   Recent Labs   Lab 01/12/19  0626   GLU 94      K 3.6   CL 99   CO2 32*    BUN 31*   CREATININE 1.7*   CALCIUM 9.0   MG 2.3     CBC:   Recent Labs   Lab 01/11/19  0200 01/12/19  0626   WBC 7.76 10.45   HGB 14.2 11.3*   HCT 44.3 34.6*    320         Assessment/Plan:      * Acute respiratory failure with hypoxia    Intubated for respiratory failure due to CHF exacerbation and CAP.  Pulm/CC consulted. No wheezes on exam. Broad spectrum abx.   Resp stain/Cx with resp yung but no bacterial growth on culture. BCx NGTD.was on  Diurese with IV Lasix.   Appreciate Pulmonary input.  Dobutamine drip started per Cards for better diuresis.  Extubated on 1/10 and doing great on NC.  Transfer out of ICU.  PT/OT evaluation.  Stable on NC O 2,       Acute pulmonary edema    Diurese with IV Lasix. Also component of pneumonia.     Acute on chronic combined systolic and diastolic congestive heart failure    EF of 20% and grade 3 diastolic CHF on echo on 11/2018.was on  Diurese with IV Lasix. Hold BB/Entresto while hypotensive.  Was Started on Dobutamine per Cards.  Slight increase in Creat.  Now off Dobutamine gtt.  Lasix switched to oral.  Was on Lisinopril and Aldactone.  hold diuretic today for rising CRT,he is stable on NC O 2.     CAP (community acquired pneumonia)    Empiric abx with Vanc/Cefepime. Resp Cx polymicrobial.   Cultures negative.  Vanc stopped and continued on Cefepime.daljit for 7 days,one day more.     Encephalopathy, metabolic    Agitated and required sedation as he was reaching for ET tube. 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 Weaned sedation with improvement in mental status on 1/7/2019.  Resolved.     Hyperlipidemia    On statin.     BMI 40.0-44.9, adult    Weight lose as out patient.     Essential hypertension    Held home Entresto and Coreg for shock requiring Levophed. Given IV Lasix for diuresis.  Able to wean off Levophed.  Follow Cards input.         VTE Risk Mitigation (From admission, onward)        Ordered     enoxaparin injection 40 mg  Daily      01/05/19 1036     IP VTE  HIGH RISK PATIENT  Once      01/05/19 [facility]              Jihan Urias MD  Department of Hospital Medicine   [facility]

## 2019-01-12 NOTE — PLAN OF CARE
Problem: Skin Injury Risk Increased  Goal: Skin Health and Integrity  Outcome: Ongoing (interventions implemented as appropriate)  Intervention: Optimize Skin Protection   01/12/19 0450   Prevent Additional Skin Injury   Head of Bed (HOB) HOB at 30-45 degrees   Pressure Reduction Devices specialty bed utilized;other (see comments)   Pressure Reduction Techniques frequent weight shift encouraged   Monitor and Manage Hypervolemia   Skin Protection tubing/devices free from skin contact     Intervention: Promote and Optimize Oral Intake   01/12/19 0450   Monitor and Manage Anemia   Oral Nutrition Promotion rest periods promoted

## 2019-01-13 VITALS
DIASTOLIC BLOOD PRESSURE: 54 MMHG | SYSTOLIC BLOOD PRESSURE: 96 MMHG | HEART RATE: 75 BPM | HEIGHT: 77 IN | RESPIRATION RATE: 18 BRPM | WEIGHT: 311 LBS | BODY MASS INDEX: 36.72 KG/M2 | OXYGEN SATURATION: 92 % | TEMPERATURE: 98 F

## 2019-01-13 LAB
ANION GAP SERPL CALC-SCNC: 8 MMOL/L
BASOPHILS # BLD AUTO: 0.05 K/UL
BASOPHILS NFR BLD: 0.6 %
BUN SERPL-MCNC: 32 MG/DL
CALCIUM SERPL-MCNC: 9.2 MG/DL
CHLORIDE SERPL-SCNC: 100 MMOL/L
CO2 SERPL-SCNC: 29 MMOL/L
CREAT SERPL-MCNC: 1.5 MG/DL
DIFFERENTIAL METHOD: ABNORMAL
EOSINOPHIL # BLD AUTO: 0.3 K/UL
EOSINOPHIL NFR BLD: 3.2 %
ERYTHROCYTE [DISTWIDTH] IN BLOOD BY AUTOMATED COUNT: 18.2 %
EST. GFR  (AFRICAN AMERICAN): 56 ML/MIN/1.73 M^2
EST. GFR  (NON AFRICAN AMERICAN): 49 ML/MIN/1.73 M^2
GLUCOSE SERPL-MCNC: 96 MG/DL
HCT VFR BLD AUTO: 32.5 %
HGB BLD-MCNC: 11.4 G/DL
LYMPHOCYTES # BLD AUTO: 1.5 K/UL
LYMPHOCYTES NFR BLD: 17.6 %
MAGNESIUM SERPL-MCNC: 2.4 MG/DL
MCH RBC QN AUTO: 30.5 PG
MCHC RBC AUTO-ENTMCNC: 35.1 G/DL
MCV RBC AUTO: 87 FL
MONOCYTES # BLD AUTO: 1 K/UL
MONOCYTES NFR BLD: 12 %
NEUTROPHILS # BLD AUTO: 5.6 K/UL
NEUTROPHILS NFR BLD: 67.3 %
PHOSPHATE SERPL-MCNC: 4.1 MG/DL
PLATELET # BLD AUTO: 363 K/UL
PMV BLD AUTO: 11 FL
POTASSIUM SERPL-SCNC: 3.5 MMOL/L
RBC # BLD AUTO: 3.74 M/UL
SODIUM SERPL-SCNC: 137 MMOL/L
WBC # BLD AUTO: 8.45 K/UL

## 2019-01-13 PROCEDURE — 63600175 PHARM REV CODE 636 W HCPCS: Mod: HCNC | Performed by: NURSE PRACTITIONER

## 2019-01-13 PROCEDURE — 25000003 PHARM REV CODE 250: Mod: HCNC | Performed by: HOSPITALIST

## 2019-01-13 PROCEDURE — 85025 COMPLETE CBC W/AUTO DIFF WBC: CPT | Mod: HCNC

## 2019-01-13 PROCEDURE — 94761 N-INVAS EAR/PLS OXIMETRY MLT: CPT | Mod: HCNC

## 2019-01-13 PROCEDURE — 25000003 PHARM REV CODE 250: Mod: HCNC | Performed by: EMERGENCY MEDICINE

## 2019-01-13 PROCEDURE — 27000221 HC OXYGEN, UP TO 24 HOURS: Mod: HCNC

## 2019-01-13 PROCEDURE — 25000242 PHARM REV CODE 250 ALT 637 W/ HCPCS: Mod: HCNC | Performed by: HOSPITALIST

## 2019-01-13 PROCEDURE — 63600175 PHARM REV CODE 636 W HCPCS: Mod: HCNC | Performed by: HOSPITALIST

## 2019-01-13 PROCEDURE — 94640 AIRWAY INHALATION TREATMENT: CPT | Mod: HCNC

## 2019-01-13 PROCEDURE — 25000003 PHARM REV CODE 250: Mod: HCNC | Performed by: NURSE PRACTITIONER

## 2019-01-13 PROCEDURE — 83735 ASSAY OF MAGNESIUM: CPT | Mod: HCNC

## 2019-01-13 PROCEDURE — 80048 BASIC METABOLIC PNL TOTAL CA: CPT | Mod: HCNC

## 2019-01-13 PROCEDURE — 36415 COLL VENOUS BLD VENIPUNCTURE: CPT | Mod: HCNC

## 2019-01-13 PROCEDURE — 25000003 PHARM REV CODE 250: Mod: HCNC | Performed by: INTERNAL MEDICINE

## 2019-01-13 PROCEDURE — 84100 ASSAY OF PHOSPHORUS: CPT | Mod: HCNC

## 2019-01-13 RX ORDER — FUROSEMIDE 80 MG/1
80 TABLET ORAL DAILY
Qty: 180 TABLET | Refills: 3
Start: 2019-01-13 | End: 2019-01-31 | Stop reason: SDUPTHER

## 2019-01-13 RX ORDER — RAMIPRIL 2.5 MG/1
2.5 CAPSULE ORAL DAILY
Qty: 30 CAPSULE | Refills: 1 | Status: SHIPPED | OUTPATIENT
Start: 2019-01-13 | End: 2019-01-31 | Stop reason: SDUPTHER

## 2019-01-13 RX ORDER — CARVEDILOL 3.12 MG/1
3.12 TABLET ORAL 2 TIMES DAILY
Qty: 60 TABLET | Refills: 1 | Status: SHIPPED | OUTPATIENT
Start: 2019-01-13 | End: 2019-01-31 | Stop reason: SDUPTHER

## 2019-01-13 RX ORDER — CEFEPIME HYDROCHLORIDE 1 G/50ML
1 INJECTION, SOLUTION INTRAVENOUS ONCE
Status: COMPLETED | OUTPATIENT
Start: 2019-01-13 | End: 2019-01-13

## 2019-01-13 RX ADMIN — CEFEPIME HYDROCHLORIDE 1 G: 1 INJECTION, SOLUTION INTRAVENOUS at 06:01

## 2019-01-13 RX ADMIN — COLCHICINE 0.6 MG: 0.6 TABLET, FILM COATED ORAL at 08:01

## 2019-01-13 RX ADMIN — SPIRONOLACTONE 25 MG: 25 TABLET ORAL at 08:01

## 2019-01-13 RX ADMIN — ASPIRIN 325 MG ORAL TABLET 325 MG: 325 PILL ORAL at 08:01

## 2019-01-13 RX ADMIN — CEFEPIME HYDROCHLORIDE 1 G: 1 INJECTION, SOLUTION INTRAVENOUS at 11:01

## 2019-01-13 RX ADMIN — CARVEDILOL 3.12 MG: 3.12 TABLET, FILM COATED ORAL at 09:01

## 2019-01-13 RX ADMIN — PRAVASTATIN SODIUM 80 MG: 40 TABLET ORAL at 08:01

## 2019-01-13 RX ADMIN — IPRATROPIUM BROMIDE AND ALBUTEROL SULFATE 3 ML: .5; 3 SOLUTION RESPIRATORY (INHALATION) at 08:01

## 2019-01-13 NOTE — PLAN OF CARE
Problem: Fall Injury Risk  Goal: Absence of Fall and Fall-Related Injury  Outcome: Ongoing (interventions implemented as appropriate)  Intervention: Identify and Manage Contributors to Fall Injury Risk   01/13/19 0538   Manage Acute Allergic Reaction   Medication Review/Management medications reviewed   Identify and Manage Contributors to Fall Injury Risk   Self-Care Promotion BADL personal objects within reach;BADL personal routines maintained     Intervention: Promote Injury-Free Environment   01/13/19 0538   Optimize [place] and Functional Mobility   Environmental Safety Modification assistive device/personal items within reach;clutter free environment maintained;lighting adjusted;room organization consistent   Optimize Balance and Safe Activity   Safety Promotion/Fall Prevention assistive device/personal item within reach;Fall Risk reviewed with patient/family;side rails raised x 3;instructed to call staff for mobility      01/13/19 0538   Optimize [place] and Functional Mobility   Environmental Safety Modification assistive device/personal items within reach;clutter free environment maintained;lighting adjusted;room organization consistent   Optimize Balance and Safe Activity   Safety Promotion/Fall Prevention assistive device/personal item within reach;Fall Risk reviewed with patient/family;side rails raised x 3;instructed to call staff for mobility         Problem: Adult Inpatient Plan of Care  Goal: Plan of Care Review   01/13/19 0538   Plan of Care Review   Plan of Care Reviewed With patient

## 2019-01-13 NOTE — PLAN OF CARE
[facility]    HOME HEALTH ORDERS  FACE TO FACE ENCOUNTER    Patient Name: Papito Bhakta  YOB: 1955    PCP: Brynn To MD   PCP Address: [address] [place] [address] / [place] [address]  PCP Phone Number: [phone]  PCP Fax: [phone]    Encounter Date: 01/13/2019    Admit to Home Health    Diagnoses:  Active Hospital Problems    Diagnosis  POA    *Acute respiratory failure with hypoxia [J96.01]  Yes    Encephalopathy, metabolic [G93.41]  Yes     Heavily sedated.  Will d/c versed today.  Fentanyl until am.  Will hold in am in anticipation of sbt.        CAP (community acquired pneumonia) [J18.9]  Yes    Acute on chronic combined systolic and diastolic congestive heart failure [I50.43]  Yes    Acute pulmonary edema [J81.0]  Yes    Hyperlipidemia [E78.5]  Yes     Chronic    BMI 40.0-44.9, adult [Z68.41]  Not Applicable     Chronic     -The patient is asked to make an attempt to improve diet and exercise patterns to aid in medical management of this problem.         Essential hypertension [I10]  Yes     Chronic      Resolved Hospital Problems    Diagnosis Date Resolved POA    Shock [R57.9] 01/10/2019 No       Future Appointments   Date Time Provider Department Center   3/8/2019  9:40 AM Brynn To MD [facility]     Follow-up Information     Brynn To MD In 1 week.    Specialty:  Internal Medicine  Contact information:  [address] [place] [address]  Thom BISHOP [id number] [phone]             Ron Kaplan MD In 1 week.    Specialty:  Cardiology  Contact information:  [address]  [address]  [facility] [address] [phone]                     I have seen and examined this patient face to face today. My clinical findings that support the need for the home health skilled services and home bound status are the following:  Weakness/numbness causing balance and gait disturbance due to Heart Failure, Infection and Weakness/Debility making it taxing to leave  home.    Allergies:Review of patient's allergies indicates:  No Known Allergies    Diet: cardiac diet    Activities: activity as tolerated    Nursing:   SN to complete comprehensive assessment including routine vital signs. Instruct on disease process and s/s of complications to report to MD. Review/verify medication list sent home with the patient at time of discharge  and instruct patient/caregiver as needed. Frequency may be adjusted depending on start of care date.    Notify MD if SBP > 160 or < 90; DBP > 90 or < 50; HR > 120 or < 50; Temp > 101.    CONSULTS:    Physical Therapy to evaluate and treat. Evaluate for home safety and equipment needs; Establish/upgrade home exercise program. Perform / instruct on therapeutic exercises, gait training, transfer training, and Range of Motion.  Occupational Therapy to evaluate and treat. Evaluate home environment for safety and equipment needs. Perform/Instruct on transfers, ADL training, ROM, and therapeutic exercises.      Medications: Review discharge medications with patient and family and provide education.      Current Discharge Medication List      START taking these medications    Details   ramipril (ALTACE) 2.5 MG capsule Take 1 capsule (2.5 mg total) by mouth once daily.  Qty: 30 capsule, Refills: 1         CONTINUE these medications which have CHANGED    Details   carvedilol (COREG) 3.125 MG tablet Take 1 tablet (3.125 mg total) by mouth 2 (two) times daily.  Qty: 60 tablet, Refills: 1      furosemide (LASIX) 80 MG tablet Take 1 tablet (80 mg total) by mouth once daily.  Qty: 180 tablet, Refills: 3    Associated Diagnoses: Chronic combined systolic and diastolic congestive heart failure         CONTINUE these medications which have NOT CHANGED    Details   aspirin 325 MG tablet Take 325 mg by mouth 2 (two) times daily.       pravastatin (PRAVACHOL) 80 MG tablet Take 1 tablet (80 mg total) by mouth once daily.  Qty: 90 tablet, Refills: 3      spironolactone  (ALDACTONE) 25 MG tablet Take 1 tablet (25 mg total) by mouth once daily.  Qty: 90 tablet, Refills: 3         STOP taking these medications       sacubitril-valsartan (ENTRESTO) 49-51 mg per tablet Comments:   Reason for Stopping:               I certify that this patient is confined to his home and needs intermittent skilled nursing care, physical therapy and occupational therapy.

## 2019-01-13 NOTE — PT/OT/SLP PROGRESS
Physical Therapy      Patient Name:  Papito Bhakta   MRN:  [id number]    Patient not seen today secondary to denial. Pt reported he was up walking with the nurse early in the morning and did not want to. Will follow-up tomorrow    Macario Maya PTA

## 2019-01-13 NOTE — NURSING
Discharge instructions and prescriptions reviewed with pt and family.  verbalized understanding. Pt and family refused to wait for case mangement. Pt notified that home health for physical therapy  has been refused  With one organization but per Shereen,  will try another one.    ShereenCase mangement states she will call and update on home health.

## 2019-01-13 NOTE — PLAN OF CARE
Problem: Fall Injury Risk  Goal: Absence of Fall and Fall-Related Injury    Intervention: Identify and Manage Contributors to Fall Injury Risk   01/12/19 1840   Manage Acute Allergic Reaction   Medication Review/Management medications reviewed   Identify and Manage Contributors to Fall Injury Risk   Self-Care Promotion independence encouraged;BADL personal objects within reach     Intervention: Promote Injury-Free Environment   01/12/19 1840   Optimize [place] and Functional Mobility   Environmental Safety Modification assistive device/personal items within reach;clutter free environment maintained;lighting adjusted   Optimize Balance and Safe Activity   Safety Promotion/Fall Prevention nonskid shoes/socks when out of bed;assistive device/personal item within reach;bed alarm set;commode/urinal/bedpan at bedside;instructed to call staff for mobility         Problem: Infection  Goal: Infection Symptom Resolution    Intervention: Prevent or Manage Infection   01/12/19 1840   Prevent or Manage Infection   Infection Management aseptic technique maintained         Problem: Skin Injury Risk Increased  Goal: Skin Health and Integrity    Intervention: Promote and Optimize Oral Intake   01/12/19 1840   Monitor and Manage Anemia   Oral Nutrition Promotion calorie dense foods provided;medicated;rest periods promoted

## 2019-01-14 ENCOUNTER — PATIENT OUTREACH (OUTPATIENT)
Dept: ADMINISTRATIVE | Facility: CLINIC | Age: 64
End: 2019-01-14

## 2019-01-14 LAB
ENTEROVIRUS: NOT DETECTED
HUMAN BOCAVIRUS: NOT DETECTED
HUMAN CORONAVIRUS, COMMON COLD VIRUS: NOT DETECTED
INFLUENZA A - H1N1-09: NOT DETECTED
PARAINFLUENZA: NOT DETECTED
RVP - ADENOVIRUS: NOT DETECTED
RVP - HUMAN METAPNEUMOVIRUS (HMPV): NOT DETECTED
RVP - INFLUENZA A: NOT DETECTED
RVP - INFLUENZA B: NOT DETECTED
RVP - RESPIRATORY SYNCTIAL VIRUS (RSV) A: NOT DETECTED
RVP - RESPIRATORY VIRAL PANEL, SOURCE: NORMAL
RVP - RHINOVIRUS: NOT DETECTED

## 2019-01-14 NOTE — PHYSICIAN QUERY
"PT Name: Papito Bhakta  MR #: [id number]    Physician Query Form -Present on Admission (POA) Diagnosis Clarification     CDS/: Nell Marcial RN CDI           Contact information:  [email or web address]    This form is a permanent document in the medical record.     Query Date: January 14, 2019    By submitting this query, we are merely seeking further clarification of documentation. Please utilize your independent clinical judgment when addressing the question(s) below.       The Medical record contains the following:    Diagnosis      Supporting Clinical Information   Location in Medical Record   Encephalopathy        CHF exacerbation with acute hypoxic respiratory failure,he has been started on  supplemental oxygen,via bipap,patient is tachypnic at this time,will continue with biapap    went into resp distress and required bipap.      was later transferred to the ICU for worsened respiratory failure--On my evaluation he was somnolent and somewhat arousable.        Encephalopathy, metabolic    Agitated and required sedation as he was reaching for ET tube. 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 Wean sedation as tolerated.   Hospital medicine H&P          Hospital medicine H&P  1/5 610 pm    Hospital medicine note   1/5 957 pm        Hospital medicine note   1/6 219 pm         Doctor, Please specify Present On Admission (POA) status of "Encephalopathy".    [ X ] Present on Admission    [  ] Not Present on Admission   [  ] Clinically Undetermined               "

## 2019-01-14 NOTE — PATIENT INSTRUCTIONS
"Discharge Instructions for Heart Failure  You have been diagnosed with heart failure. The term "heart failure" sounds scary as it suggests the heart has stopped working. But it actually means the heart is not doing its job as well as it should. Heart failure happens when your heart muscle cannot keep up with your body's need for blood flow. Symptoms of heart failure can be controlled by changes in your lifestyle and by following your doctor's advice.   Home Care  Activity   Ask your doctor about an exercise program. You can benefit from simple activities such as walking or gardening. Exercising most days of the week can make you feel better. Don't be discouraged if your progress is slow at first. Rest as needed and stop activity if you develop symptoms such as chest pain, lightheadedness, or significant shortness of breath. Your doctor may prescribe a cardiac rehabilitation program. This is a program to help recover from heart disease through professional lifestyle counseling and education and medically supervised physical activity.   Diet   Follow a heart healthy diet and work hard to remove salt from your diet. Try to limit total salt intake to 2000 mg a day or less. Salt causes your body to retain water, which can make it harder for your heart to pump. You can limit salt by doing the following:  Limit canned, dried, packaged, and fast foods.  Don't add salt to your food at the table.  Season foods with herbs instead of salt when you cook.  Monitor your fluid intake. Drinking too much fluid can make heart failure worse. It is commonly advised to limit total fluid intake to less than 66 ounces (2 liters) a day.  Limit alcohol. Too much alcohol can be harmful to the heart. Alcohol should be limited to no more than one serving a day for women and two servings a day for men.  Tobacco   Break the smoking habit. Smoking increases your chance of having a heart attack, which will worsen heart failure. Quitting smoking is " the number one thing you can do to improve your health. Enroll in a stop smoking program and ask your doctor about medications or nicotine replacement therapy. These methods improve your chances of success.  Medications   Take your medications exactly as prescribed. Learn the names and purpose of each of your medications. Keep an accurate medication list with you at all times including current dosages. Don't skip doses. If you miss a dose of your medication, take it as soon as you remember, unless it's almost time for your next dose. In that case, just wait and take your next dose at the normal time. Don't take a double dose. If you are unsure, contact your doctor or pharmacist.  Weight monitoring   Weigh yourself every day. Do this at the same time of day and in the same kind of clothes. Ideally, weigh yourself first thing every morning after you empty your bladder, but before you eat breakfast. Record your weight and take a record of it to each of your doctor's visit. If your weight increases by 3 pounds in one day or 5 pounds in one week, you should contact your doctor. This is a sign that you are retaining more fluid than you should be, which can worsen heart failure.  Symptoms   Heart failure can cause a variety of symptoms including the following:  Shortness of breath  Difficulty breathing at night  Swelling in the legs and feet or in the abdomen  Becoming easily fatigued  Irregular or rapid heartbeat  Weakness or lightheadedness  It is important to know what to do if you develop signs of worsening heart failure.  Follow-Up  Make a follow-up appointment as directed by our staff. They will provide specific instruction for timing of appointments. Depending on the type and severity of heart failure you have, you may require follow up as early as 7 days from hospital discharge. Keep appointments for checkups and lab tests that are needed to check your medications and condition.  .    When to Call Your Doctor  Call  your doctor right away if you have any of the following signs of worsening heart failure:  Sudden weight gain (3 or more pounds in one day or 5 or more pounds in one week)  Trouble breathing not related to being active  New or increased swelling of your legs or ankles  Swelling or pain in your abdomen  Breathing trouble at night (waking up short of breath, needing more pillows to breathe)  Frequent coughing that doesnt go away  Feeling much more tired than usual  When to Seek Emergency Medical Attention   Call 911 right away if you develop:  Severe shortness of breath, such that you cannot catch your breath, even resting  Severe chest pain that does not resolve with rest or nitroglycerin  Pink, foamy mucus with cough and shortness of breath  A continuous rapid or irregular heartbeat  Passing out or fainting  Acute stroke symptoms such as sudden numbness or weakness on one side of your face, arm, or leg, or sudden confusion, trouble speaking or vision changes.   © 4021-3043 [employer], [address] [place] [address], [place] [address]. All rights reserved. This information is not intended as a substitute for professional medical care. Always follow your healthcare professional's instructions.

## 2019-01-14 NOTE — DISCHARGE SUMMARY
"[facility]  Discharge Summary      Patient Name: Papito Bhakta  MRN: [id number]  Admission Date: 1/5/2019  Hospital Length of Stay: 8 days  Discharge Date and Time: 1/13/2019  1:34 PM  Attending Physician: Nena Martínez MD  Discharging Provider: Nena Martínez MD  Primary Care Provider: Brynn To MD      HPI:   Mr. Bhakta is a 64 yo man with combined HF (EF 20%, G3DD), AICD in place, Hyperlipidemia, and Hypertension who presented to the ED c/o gradually worsening and severe (10/10) SOB with associated bilateral lower extremity swelling x1 week. He also reports a hematemesis and productive cough with sputum described as "like grits." He is not on supplementary O2 at home. He is compliant with his Lasix 80mg x2/day. He denies fever, chills, diaphoresis, nausea, emesis, diarrhea, abdominal pain, chest pain, back pain, difficulty urinating and rash. Patient was hypoxic upon arrival to 80%. He was started on supplemental oxygen via BiPAP. CXR w/ acute pulmonary edema. He was started on IV Lasix. He claims to be complaint with a low salt diet and Lasix.    * No surgery found *      Hospital Course:   Mr. Bhakta was admitted for respiratory failure thought to be due to CHF exacerbation. He was started on BiPAP and diuresis. He had no leukocytosis or fever at the time of presentation. CXR was consistent with pulmonary edema without evidence of consolidation on presentation. He had worsening respiratory failure the night following admission and required intubation. Upon intubation he was found to have a copious amount of thick, yellow sputum that was almost appearing purulent. He was started on broad spectrum antibiotics with cefepime and vancomycin. Respiratory stain with normal respiratory yung but cultures was NGTD. Blood cultures were negative final.  Episodes of Vtach noted and Cardiology consulted.  He as started on Dobutamine for better diuresis per Cards.  Patient " extubated on 1/10 without further respiratory issues.  He completed full 7 days of Cefepime as per Pulmonary recommendations,  Dobutamine drip stopped as per Cardiology and Lasix was switched to oral regimen. His respiratory status continued to improve and he was assessed for possible O2 needs on the day of discharge and his sats were 90-91% on RA with activity and 93% on RA at rest. No O2 needed for discharge. Pt was arranged for home health with PT/OT and close follow up with PCP and Cardiology.     Consults:   Consults (From admission, onward)        Status Ordering Provider     Inpatient consult to Cardiology  Once     Provider:  Shailesh Eng MD    Completed LEILA FAIR     Inpatient consult to Pulmonology  Once     Provider:  (Not yet assigned)    Completed HUGO JUÁREZ     IP consult to dietary  Once     Provider:  (Not yet assigned)    HUGO Greco        Service: Hospital Medicine    Final Active Diagnoses:    Diagnosis Date Noted POA    PRINCIPAL PROBLEM:  Acute respiratory failure with hypoxia [J96.01] 01/05/2019 Yes    Encephalopathy, metabolic [G93.41] 01/06/2019 Yes    CAP (community acquired pneumonia) [J18.9] 01/06/2019 Yes    Acute on chronic combined systolic and diastolic congestive heart failure [I50.43] 01/05/2019 Yes    Acute pulmonary edema [J81.0] 01/05/2019 Yes    Hyperlipidemia [E78.5] 01/05/2019 Yes     Chronic    BMI 40.0-44.9, adult [Z68.41] 03/05/2018 Not Applicable     Chronic    Essential hypertension [I10] 12/01/2017 Yes     Chronic      Problems Resolved During this Admission:    Diagnosis Date Noted Date Resolved POA    Shock [R57.9] 01/07/2019 01/10/2019 No       Discharged Condition: good    Disposition: Home or Self Care    Follow Up:  Follow-up Information     Brynn To MD In 1 week.    Specialty:  Internal Medicine  Contact information:  [facility]  [place] [address] [phone]             Ron Kaplan MD In 1 week.     Specialty:  Cardiology  Contact information:  [address]  [address]  [place] [address]  [phone]                 Patient Instructions:      Diet Cardiac     Activity as tolerated       Significant Diagnostic Studies:     Pending Diagnostic Studies:     Procedure Component Value Units Date/Time    CT Chest Without Contrast [[id number]] Resulted:  01/09/19 1158    Order Status:  Sent Lab Status:  In process Updated:  01/09/19 1236         Medications:  Reconciled Home Medications:      Medication List      START taking these medications    ramipril 2.5 MG capsule  Commonly known as:  ALTACE  Take 1 capsule (2.5 mg total) by mouth once daily.        CHANGE how you take these medications    carvedilol 3.125 MG tablet  Commonly known as:  COREG  Take 1 tablet (3.125 mg total) by mouth 2 (two) times daily.  What changed:    · medication strength  · how much to take  · when to take this     furosemide 80 MG tablet  Commonly known as:  LASIX  Take 1 tablet (80 mg total) by mouth once daily.  What changed:  when to take this        CONTINUE taking these medications    aspirin 325 MG tablet  Take 325 mg by mouth 2 (two) times daily.     pravastatin 80 MG tablet  Commonly known as:  PRAVACHOL  Take 1 tablet (80 mg total) by mouth once daily.     spironolactone 25 MG tablet  Commonly known as:  ALDACTONE  Take 1 tablet (25 mg total) by mouth once daily.        STOP taking these medications    ENTRESTO 49-51 mg per tablet  Generic drug:  sacubitril-valsartan            Indwelling Lines/Drains at time of discharge:   Lines/Drains/Airways          None          Time spent on the discharge of patient: 35 minutes  Patient was seen and examined on the date of discharge and determined to be suitable for discharge.         Nena Martínez MD  Department of Hospital Medicine  [facility]

## 2019-01-14 NOTE — PT/OT/SLP DISCHARGE
Physical Therapy Discharge Summary    Name: Papito Bhakta  MRN: [id number]   Principal Problem: Acute respiratory failure with hypoxia     Patient Discharged from acute Physical Therapy on 19.  Please refer to prior PT notes for functional status.     Assessment:     Patient was discharged to home. Refer to therapy initial evaluation and last progress note for initial and most recent functional status and goal achievement.  Recommendations made may be found in medical record.    Objective:     GOALS:   Multidisciplinary Problems     Physical Therapy Goals        Problem: Physical Therapy Goal    Goal Priority Disciplines Outcome Goal Variances Interventions   Physical Therapy Goal     PT, PT/OT Ongoing (interventions implemented as appropriate)     Description:  Goals to be met by: 19     Patient will increase functional independence with mobility by performin. Supine to sit with Modified [place]  2. Rolling to Left and Right with Modified [place]  3. Sit to stand transfer with Modified [place] using RW  4. Bed to chair transfer with Modified [place] using Rolling Walker  5. Gait  x 250 feet with Modified [place] using Rolling Walker   6. Lower extremity exercise program 3 sets x10 reps per handout, with independence                      Reasons for Discontinuation of Therapy Services  Transfer to alternate level of care.      Plan:     Patient Discharged to: Home with Home Health Service.    Sue Hebert, PT  2019

## 2019-01-14 NOTE — Clinical Note
Please forward this important TCC information to your provider in order to maximize the post discharge care delivery of this patient.C3 nurse spoke with Papito Yony  for a TCC post hospital discharge follow up call. The patient does not have a scheduled HOSFU appointment with Brynn To MD  within 7-14 days post hospital discharge date 1/13/19. C3 nurse was unable to schedule HOSFU appointment in [facility].Please contact patient and schedule follow up appointment using HOSFU visit type on or before 1/20 with the latest appt being on 1/27..Pt has an appt w/Dr Eng(card) on 2/1 at 0900.THANKING YOU IN ADVANCE.Respectfully,Deloris Meehan, LAURENCare Coordination Center C3  [email or web address]   Please do not reply to this message, as this inbox is not routinely monitored.

## 2019-01-14 NOTE — PT/OT/SLP DISCHARGE
Occupational Therapy Discharge Summary    aPpito Bhakta  MRN: [id number]   Principal Problem: Acute respiratory failure with hypoxia      Patient Discharged from acute Occupational Therapy on 1/11/2019.  Please refer to prior OT note dated 1/10/2019 for functional status.    Assessment:      Patient was discharged unexpectedly.  Information required to complete an accurate discharge summary is unknown.  Refer to therapy initial evaluation and last progress note for initial and most recent functional status and goal achievement.  Recommendations made may be found in medical record.    Objective:     GOALS:   Multidisciplinary Problems     Occupational Therapy Goals        Problem: Occupational Therapy Goal    Goal Priority Disciplines Outcome Interventions   Occupational Therapy Goal     OT, PT/OT Ongoing (interventions implemented as appropriate)    Description:  Goals to be met by: 1/18/2019     Patient will increase functional independence with ADLs by performing:    UE Dressing with Modified [place].  LE Dressing with Set-up Assistance.  Grooming while standing at sink with Stand-by Assistance.  Toileting from bedside commode with Set-up Assistance for hygiene and clothing management.   Step transfer with Contact Guard Assistance with AE  Toilet transfer to bedside commode with Contact Guard Assistance.  Upper extremity exercise program with assistance as needed.                      Reasons for Discontinuation of Therapy Services  Patient declines to continue care. and elected to return home with family      Plan:     Patient Discharged to: Home no OT services needed    KAELA Sadler, MS  1/14/2019

## 2019-01-15 ENCOUNTER — TELEPHONE (OUTPATIENT)
Dept: FAMILY MEDICINE | Facility: CLINIC | Age: 64
End: 2019-01-15

## 2019-01-15 NOTE — TELEPHONE ENCOUNTER
----- Message from Deloris Meehan RN sent at 1/14/2019 11:30 AM CST -----  Please forward this important TCC information to your provider in order to maximize the post discharge care delivery of this patient.    C3 nurse spoke with Papito Yony  for a TCC post hospital discharge follow up call. The patient does not have a scheduled HOSFU appointment with Brynn To MD  within 7-14 days post hospital discharge date 1/13/19. C3 nurse was unable to schedule HOSFU appointment in [facility].  Please contact patient and schedule follow up appointment using HOSFU visit type on or before 1/20 with the latest appt being on 1/27..  Pt has an appt w/Dr Eng(card) on 2/1 at 0900.    THANKING YOU IN ADVANCE.    Respectfully,  Deloris Meehan RN    [facility] C3    [email or web address]       Please do not reply to this message, as this inbox is not routinely monitored.

## 2019-01-16 NOTE — PHYSICIAN QUERY
PT Name: Papito Bhakta  MR #: [id number]    Physician Query Form - Perfusion Diagnosis Clarification     CDS/: Nell Marcial RN CDI            Contact information: [email or web address]  This form is a permanent document in the medical record.     Query Date: January 16, 2019    By submitting this query, we are merely seeking further clarification of documentation. Please utilize your independent clinical judgment when addressing the question(s) below.    The medical record contains the following:    Indicators   Supporting Clinical Findings   Location in Medical Record   X Acute Illness (e.g. AMI, Sepsis, etc.) Shock    --presentation consistent with acute on chronic HF exacerbation/cardiogenic shock with pulmonary edema, hypoxemia, LE edema. However, given fever and pneumonia, septic shock could also be playing a role  --would continue levophed, titrate to MAP 65   --continue Abx as above for pneumonia  --continue diuresis as tolerated. Continue holding BB, ANRI  Resp culture growing gram positive cocci in pairs and chains and GNR's. Shock also developed on the night of 1/5, suspected to be cardiogenic vs septic, so a central line was placed and levophed started. He developed a fever on 1/6 (102 F).  CAP (community acquired pneumonia)     Acute on chronic combined systolic and diastolic congestive heart failure  Encephalopathy, metabolic  Acute respiratory failure with hypoxia   Pulmonary note  1/7 1150 am    Acidosis documented     X ABGs / Labs WBC 1/5 - 1/13 = 5.49 - 10.77  Procaclitonin = 1/6 = 0.10    1/9 = 0.18    -Urine culture 1/5 - no growth  -Blood culture 1/5 no growth  -Respiratory culture - 1/5 = Moderate Gram positive cocci in pairs and chains   Labs   X Vital Signs 1/5 - 1846 = 79/42  Mean - 56          1901 = 81/46          1916 = 81/45          1931 = 85/51          1946 = 89/42  Mean - 50          2046 = 90/54  Mean - 68 Vitals   X Hypotension or Low Blood Pressure documented Norepinephrine  gtt to maintain MAP > 65 RN note 1/6 713 am   X Altered Mental Status or Confusion On my evaluation he was somnolent and somewhat arousable.     Hospital medicine note 1/5 957 pm    Diaphoresis, Cold Extremities or Cyanosis      Oliguria     X Medication/Treatment:  -Vasopressors  -Inotropic Drugs  -IV Fluids  -Cardiac Assist Devices  -Hemodynamic Monitoring  -Blood/Blood Products --would continue levophed, titrate to MAP 65   --continue Abx as above for pneumonia  --continue diuresis as tolerated. Continue holding BB, ANRI    Norepinephrine drip IV 1/5 1915 - 1/8 1943 Pulmonary note  1/7 1150 am    Other:       Provider, please specify diagnosis or diagnoses associated with above clinical findings.    [   ] Cardiogenic Shock   [   ] Septic Shock   [   ] Other Shock (please specify):   [  X ] Shock Unspecified   [   ] Other Condition (please specify):   [  ] Clinically Undetermined         Please document in your progress notes daily for the duration of treatment until resolved and include in your discharge summary.

## 2019-01-18 NOTE — PROGRESS NOTES
Spoke with intake at [facility] who accepted patient on discharge. They state the patient/family refused H/H service stating his daughter helps him with everything and they did not need the service.

## 2019-01-31 ENCOUNTER — OFFICE VISIT (OUTPATIENT)
Dept: FAMILY MEDICINE | Facility: CLINIC | Age: 64
End: 2019-01-31
Payer: MEDICARE

## 2019-01-31 VITALS
WEIGHT: 303.88 LBS | SYSTOLIC BLOOD PRESSURE: 120 MMHG | OXYGEN SATURATION: 98 % | RESPIRATION RATE: 16 BRPM | HEIGHT: 77 IN | DIASTOLIC BLOOD PRESSURE: 76 MMHG | HEART RATE: 69 BPM | TEMPERATURE: 98 F | BODY MASS INDEX: 35.88 KG/M2

## 2019-01-31 DIAGNOSIS — J18.9 COMMUNITY ACQUIRED PNEUMONIA, UNSPECIFIED LATERALITY: ICD-10-CM

## 2019-01-31 DIAGNOSIS — K59.00 CONSTIPATION, UNSPECIFIED CONSTIPATION TYPE: ICD-10-CM

## 2019-01-31 DIAGNOSIS — I50.42 CHRONIC COMBINED SYSTOLIC AND DIASTOLIC CONGESTIVE HEART FAILURE: ICD-10-CM

## 2019-01-31 DIAGNOSIS — Z09 HOSPITAL DISCHARGE FOLLOW-UP: Primary | ICD-10-CM

## 2019-01-31 DIAGNOSIS — I10 ESSENTIAL HYPERTENSION: Chronic | ICD-10-CM

## 2019-01-31 PROCEDURE — 99999 PR PBB SHADOW E&M-EST. PATIENT-LVL III: ICD-10-PCS | Mod: PBBFAC,HCNC,, | Performed by: FAMILY MEDICINE

## 2019-01-31 PROCEDURE — 99214 OFFICE O/P EST MOD 30 MIN: CPT | Mod: HCNC,S$GLB,, | Performed by: FAMILY MEDICINE

## 2019-01-31 PROCEDURE — 99999 PR PBB SHADOW E&M-EST. PATIENT-LVL III: CPT | Mod: PBBFAC,HCNC,, | Performed by: FAMILY MEDICINE

## 2019-01-31 PROCEDURE — 99214 PR OFFICE/OUTPT VISIT, EST, LEVL IV, 30-39 MIN: ICD-10-PCS | Mod: HCNC,S$GLB,, | Performed by: FAMILY MEDICINE

## 2019-01-31 RX ORDER — RAMIPRIL 2.5 MG/1
2.5 CAPSULE ORAL DAILY
Qty: 30 CAPSULE | Refills: 1 | Status: SHIPPED | OUTPATIENT
Start: 2019-01-31 | End: 2019-02-28 | Stop reason: SDUPTHER

## 2019-01-31 RX ORDER — CARVEDILOL 3.12 MG/1
3.12 TABLET ORAL 2 TIMES DAILY
Qty: 60 TABLET | Refills: 1 | Status: SHIPPED | OUTPATIENT
Start: 2019-01-31 | End: 2019-02-28 | Stop reason: SDUPTHER

## 2019-01-31 RX ORDER — CEPHALEXIN 500 MG/1
500 CAPSULE ORAL EVERY 12 HOURS
Qty: 10 CAPSULE | Refills: 0 | Status: SHIPPED | OUTPATIENT
Start: 2019-01-31 | End: 2019-02-05

## 2019-01-31 RX ORDER — FUROSEMIDE 80 MG/1
80 TABLET ORAL DAILY
Qty: 180 TABLET | Refills: 3 | Status: SHIPPED | OUTPATIENT
Start: 2019-01-31 | End: 2020-03-03 | Stop reason: SDUPTHER

## 2019-01-31 NOTE — PROGRESS NOTES
Chief Complaint   Patient presents with    Hospital Follow Up       HPI  Papito Bhakta is a 63 y.o. male with multiple medical diagnoses as listed in the medical history and problem list that presents for follow-up from his hospital admission 01/5-01/13 for CHF exacerbation. He is s/p 7 days of IV Vanc and Cefepime. He has been having coughing with thick sputum that is clear now but is not resolved. He denies shortness of breath or wheezing. He has not had chest pain.     Hospital Course:   Mr. Bhakta was admitted for respiratory failure thought to be due to CHF exacerbation. He was started on BiPAP and diuresis. He had no leukocytosis or fever at the time of presentation. CXR was consistent with pulmonary edema without evidence of consolidation on presentation. He had worsening respiratory failure the night following admission and required intubation. Upon intubation he was found to have a copious amount of thick, yellow sputum that was almost appearing purulent. He was started on broad spectrum antibiotics with cefepime and vancomycin. Respiratory stain with normal respiratory yung but cultures was NGTD. Blood cultures were negative final.  Episodes of Vtach noted and Cardiology consulted.  He as started on Dobutamine for better diuresis per Cards.  Patient extubated on 1/10 without further respiratory issues.  He completed full 7 days of Cefepime as per Pulmonary recommendations,  Dobutamine drip stopped as per Cardiology and Lasix was switched to oral regimen. His respiratory status continued to improve and he was assessed for possible O2 needs on the day of discharge and his sats were 90-91% on RA with activity and 93% on RA at rest. No O2 needed for discharge. Pt was arranged for home health with PT/OT and close follow up with PCP and Cardiology.     PAST MEDICAL HISTORY:  Past Medical History:   Diagnosis Date    CHF (congestive heart failure)     Hyperlipidemia     Hypertension        PAST SURGICAL  HISTORY:  Past Surgical History:   Procedure Laterality Date    Heart Cath-Bilateral Bilateral 2018    Performed by Shailesh Eng MD at [facility] CATH LAB    TESTICLE SURGERY         SOCIAL HISTORY:  Social History     Socioeconomic History    Marital status:      Spouse name: Not on file    Number of children: Not on file    Years of education: Not on file    Highest education level: Not on file   Social Needs    Financial resource strain: Not on file    Food insecurity - worry: Not on file    Food insecurity - inability: Not on file    Transportation needs - medical: Not on file    Transportation needs - non-medical: Not on file   Occupational History    Not on file   Tobacco Use    Smoking status: Former Smoker     Packs/day: 0.50     Years: 40.00     Pack years: 20.00     Types: Cigarettes, Cigars     Last attempt to quit: 2014     Years since quittin.0    Smokeless tobacco: Never Used   Substance and Sexual Activity    Alcohol use: Yes     Comment: once a month    Drug use: No    Sexual activity: Yes     Birth control/protection: None     Comment: uses protection sometimes   Other Topics Concern    Not on file   Social History Narrative    Not on file       FAMILY HISTORY:  Family History   Problem Relation Age of Onset    Epilepsy Mother        ALLERGIES AND MEDICATIONS: updated and reviewed.  Review of patient's allergies indicates:  No Known Allergies  Current Outpatient Medications   Medication Sig Dispense Refill    aspirin 325 MG tablet Take 325 mg by mouth 2 (two) times daily.       carvedilol (COREG) 3.125 MG tablet Take 1 tablet (3.125 mg total) by mouth 2 (two) times daily. 60 tablet 1    furosemide (LASIX) 80 MG tablet Take 1 tablet (80 mg total) by mouth once daily. 180 tablet 3    pravastatin (PRAVACHOL) 80 MG tablet Take 1 tablet (80 mg total) by mouth once daily. 90 tablet 3    ramipril (ALTACE) 2.5 MG capsule Take 1 capsule (2.5 mg total) by mouth once  "daily. 30 capsule 1    spironolactone (ALDACTONE) 25 MG tablet Take 1 tablet (25 mg total) by mouth once daily. 90 tablet 3    cephALEXin (KEFLEX) 500 MG capsule Take 1 capsule (500 mg total) by mouth every 12 (twelve) hours. for 5 days 10 capsule 0     No current facility-administered medications for this visit.        ROS  Review of Systems   Constitutional: Negative for chills, fatigue, fever and unexpected weight change.   HENT: Negative for ear pain, postnasal drip, rhinorrhea, sinus pressure and sore throat.    Eyes: Negative for photophobia and visual disturbance.   Respiratory: Positive for cough. Negative for apnea, chest tightness, shortness of breath and wheezing.    Cardiovascular: Negative for chest pain and palpitations.   Gastrointestinal: Negative for abdominal pain, blood in stool, constipation, diarrhea, nausea and vomiting.   Genitourinary: Negative for difficulty urinating.   Musculoskeletal: Negative for arthralgias and joint swelling.   Skin: Negative for rash.   Neurological: Negative for facial asymmetry, speech difficulty, weakness, numbness and headaches.   Psychiatric/Behavioral: Negative for dysphoric mood.       Physical Exam  Vitals:    01/31/19 1057   BP: 120/76   Pulse: 69   Resp: 16   Temp: 98 °F (36.7 °C)   TempSrc: Oral   SpO2: 98%   Weight: (!) 137.9 kg (303 lb 14.5 oz)   Height: 6' 5" (1.956 m)    Body mass index is 36.04 kg/m².  Weight: (!) 137.9 kg (303 lb 14.5 oz)   Height: 6' 5" (195.6 cm)     Physical Exam   Constitutional: He is oriented to person, place, and time. He appears well-developed and well-nourished.   HENT:   Head: Normocephalic and atraumatic.   Mouth/Throat: No oropharyngeal exudate.   Eyes: EOM are normal.   Cardiovascular: Normal rate, regular rhythm and normal heart sounds. Exam reveals no gallop and no friction rub.   No murmur heard.  Pulmonary/Chest: Effort normal. No respiratory distress. He has no wheezes. He has rales in the left lower field. He " exhibits no tenderness.   Lymphadenopathy:     He has no cervical adenopathy.   Neurological: He is alert and oriented to person, place, and time.   Skin: Skin is warm and dry.   Psychiatric: He has a normal mood and affect. His behavior is normal.   Nursing note and vitals reviewed.      Health Maintenance       Date Due Completion Date    Zoster Vaccine 05/05/2015 ---    Lipid Panel 12/02/2022 12/2/2017    Colonoscopy 10/10/2026 10/10/2016 (Done)    Override on 10/10/2016: Done    TETANUS VACCINE 06/06/2028 6/6/2018            ASSESSMENT     1. Hospital discharge follow-up    2. Chronic combined systolic and diastolic congestive heart failure    3. Essential hypertension    4. Constipation, unspecified constipation type    5. Community acquired pneumonia, unspecified laterality        PLAN:     Problem List Items Addressed This Visit        Pulmonary    CAP (community acquired pneumonia)  -will resume abx due to chronicity of cough and severity of sx requiring intubation in the hospital  -keflex as he was covered with Cefepime inpatient    Relevant Medications    cephALEXin (KEFLEX) 500 MG capsule       Cardiac/Vascular    Essential hypertension (Chronic)  -controlled on current regimen    Chronic combined systolic and diastolic congestive heart failure  -we reviewed his medications as it has changed  -reviewed x rays and inpatient labs    Relevant Medications    carvedilol (COREG) 3.125 MG tablet    furosemide (LASIX) 80 MG tablet    ramipril (ALTACE) 2.5 MG capsule      Other Visit Diagnoses     Hospital discharge follow-up    -  Primary  -stable s/p discharge    Constipation, unspecified constipation type     -recommend adding stool softener             Brynn To MD  02/01/2019 11:18 AM        Follow-up in about 6 weeks (around 3/14/2019) for Follow up.

## 2019-02-01 ENCOUNTER — OFFICE VISIT (OUTPATIENT)
Dept: CARDIOLOGY | Facility: CLINIC | Age: 64
End: 2019-02-01
Payer: MEDICARE

## 2019-02-01 VITALS
OXYGEN SATURATION: 96 % | RESPIRATION RATE: 16 BRPM | HEART RATE: 67 BPM | BODY MASS INDEX: 36.08 KG/M2 | WEIGHT: 304.25 LBS | SYSTOLIC BLOOD PRESSURE: 120 MMHG | DIASTOLIC BLOOD PRESSURE: 72 MMHG

## 2019-02-01 DIAGNOSIS — E78.49 OTHER HYPERLIPIDEMIA: ICD-10-CM

## 2019-02-01 DIAGNOSIS — I10 ESSENTIAL HYPERTENSION: ICD-10-CM

## 2019-02-01 DIAGNOSIS — J96.01 ACUTE RESPIRATORY FAILURE WITH HYPOXIA: ICD-10-CM

## 2019-02-01 DIAGNOSIS — I50.22 CHRONIC SYSTOLIC CONGESTIVE HEART FAILURE: Primary | ICD-10-CM

## 2019-02-01 PROCEDURE — 3074F PR MOST RECENT SYSTOLIC BLOOD PRESSURE < 130 MM HG: ICD-10-PCS | Mod: HCNC,CPTII,S$GLB, | Performed by: INTERNAL MEDICINE

## 2019-02-01 PROCEDURE — 99214 OFFICE O/P EST MOD 30 MIN: CPT | Mod: HCNC,S$GLB,, | Performed by: INTERNAL MEDICINE

## 2019-02-01 PROCEDURE — 99999 PR PBB SHADOW E&M-EST. PATIENT-LVL III: ICD-10-PCS | Mod: PBBFAC,HCNC,, | Performed by: INTERNAL MEDICINE

## 2019-02-01 PROCEDURE — 3008F BODY MASS INDEX DOCD: CPT | Mod: HCNC,CPTII,S$GLB, | Performed by: INTERNAL MEDICINE

## 2019-02-01 PROCEDURE — 3078F DIAST BP <80 MM HG: CPT | Mod: HCNC,CPTII,S$GLB, | Performed by: INTERNAL MEDICINE

## 2019-02-01 PROCEDURE — 3074F SYST BP LT 130 MM HG: CPT | Mod: HCNC,CPTII,S$GLB, | Performed by: INTERNAL MEDICINE

## 2019-02-01 PROCEDURE — 3078F PR MOST RECENT DIASTOLIC BLOOD PRESSURE < 80 MM HG: ICD-10-PCS | Mod: HCNC,CPTII,S$GLB, | Performed by: INTERNAL MEDICINE

## 2019-02-01 PROCEDURE — 3008F PR BODY MASS INDEX (BMI) DOCUMENTED: ICD-10-PCS | Mod: HCNC,CPTII,S$GLB, | Performed by: INTERNAL MEDICINE

## 2019-02-01 PROCEDURE — 99214 PR OFFICE/OUTPT VISIT, EST, LEVL IV, 30-39 MIN: ICD-10-PCS | Mod: HCNC,S$GLB,, | Performed by: INTERNAL MEDICINE

## 2019-02-01 PROCEDURE — 99999 PR PBB SHADOW E&M-EST. PATIENT-LVL III: CPT | Mod: PBBFAC,HCNC,, | Performed by: INTERNAL MEDICINE

## 2019-02-01 NOTE — PROGRESS NOTES
Subjective:    Patient ID:  Papito Bhakta is a 63 y.o. male who presents for follow-up of Hospital Follow Up and Congestive Heart Failure      HPI  Previous history:  Here for follow-up of nonischemic cardiomyopathy.  He recently was admitted the hospital with worsening volume overload.  He is noncompliant with his diet.  He has been taking his medicines but they had increased his Lasix.  He currently denies any PND, orthopnea but has stable lower extremity edema.  Is usually improved as well with elevation.  He denies any dizziness, presyncope or syncope.  He still refuses sleep study and defibrillator placement.    Today:  Here for follow-up of nonischemic cardiomyopathy.        Review of Systems   Constitution: Negative.   HENT: Negative.    Eyes: Negative.    Cardiovascular: Positive for leg swelling. Negative for chest pain, dyspnea on exertion, irregular heartbeat, near-syncope, orthopnea, palpitations, paroxysmal nocturnal dyspnea and syncope.   Respiratory: Negative for shortness of breath.    Skin: Negative.    Musculoskeletal: Negative.    Gastrointestinal: Negative for abdominal pain, constipation and diarrhea.   Genitourinary: Negative for dysuria.   Neurological: Negative for dizziness.   Psychiatric/Behavioral: Negative.         Objective:    Physical Exam   Constitutional: He is oriented to person, place, and time. He appears well-developed and well-nourished. No distress.   HENT:   Head: Normocephalic and atraumatic.   Eyes: Conjunctivae and EOM are normal. Pupils are equal, round, and reactive to light.   Neck: Normal range of motion. Neck supple. No thyromegaly present.   Cardiovascular: Normal rate, regular rhythm and normal heart sounds.   No murmur heard.  Pulmonary/Chest: Effort normal and breath sounds normal. No respiratory distress. He has no wheezes. He has no rales. He exhibits no tenderness.   Abdominal: Soft. Bowel sounds are normal.   Musculoskeletal: He exhibits edema.   Neurological:  He is alert and oriented to person, place, and time.   Skin: Skin is warm and dry.   Psychiatric: He has a normal mood and affect. His behavior is normal.       ECHO:  1-19  · Limited study to assess function  · Severely decreased left ventricular systolic function. The estimated ejection fraction is 10%  · Left ventricular diastolic dysfunction.  · Severely reduced right ventricular systolic function.  · No thrombus     Right/left heart catheterization:  2-18  B. Summary/Post-Operative Diagnosis       Normal coronary arteries.    Systolic dysfunction.    Low right and left Filling Pressures.    Mild Pulmonary Hypertension.  Assessment:       1. Chronic systolic congestive heart failure    2. Acute respiratory failure with hypoxia    3. Essential hypertension    4. BMI 40.0-44.9, adult    5. Other hyperlipidemia         Plan:       -Currently stable without any signs of central congestion, continue med therapy  -now agreeable to a biventricular ICD placement post hospitalization  -life expectancy greater than 1 year, nonischemic dilated cardiomyopathy with QRS greater than 120 milliseconds and life expectancy greater than 1 year with New York heart Association class 3  -Chronic lower extremity edema 2nd venous stasis is stable on current medicines, unable to tolerate compression stockings currently  -okay to continue maintenance Lasix as is  -Counseled on sodium restriction and diet/exercise  -Counseled on diet and exercise     Return to clinic after the procedure    **Risks/benefits of the procedure were d/w the patient including bleeding, infection, ptx, etc.  Patient understands and wishes to proceed.  Consent was placed on the chart.

## 2019-02-06 ENCOUNTER — HOSPITAL ENCOUNTER (OUTPATIENT)
Dept: PREADMISSION TESTING | Facility: HOSPITAL | Age: 64
Discharge: HOME | End: 2019-02-06
Attending: INTERNAL MEDICINE
Payer: MEDICARE

## 2019-02-06 VITALS
DIASTOLIC BLOOD PRESSURE: 68 MMHG | TEMPERATURE: 98 F | SYSTOLIC BLOOD PRESSURE: 109 MMHG | BODY MASS INDEX: 35.61 KG/M2 | WEIGHT: 301.56 LBS | OXYGEN SATURATION: 96 % | RESPIRATION RATE: 18 BRPM | HEART RATE: 65 BPM | HEIGHT: 77 IN

## 2019-02-06 LAB
ANION GAP SERPL CALC-SCNC: 9 MMOL/L
BASOPHILS # BLD AUTO: 0.02 K/UL
BASOPHILS NFR BLD: 0.4 %
BUN SERPL-MCNC: 17 MG/DL
CALCIUM SERPL-MCNC: 9.8 MG/DL
CHLORIDE SERPL-SCNC: 104 MMOL/L
CO2 SERPL-SCNC: 28 MMOL/L
CREAT SERPL-MCNC: 1.1 MG/DL
DIFFERENTIAL METHOD: ABNORMAL
EOSINOPHIL # BLD AUTO: 0.3 K/UL
EOSINOPHIL NFR BLD: 5 %
ERYTHROCYTE [DISTWIDTH] IN BLOOD BY AUTOMATED COUNT: 16.2 %
EST. GFR  (AFRICAN AMERICAN): >60 ML/MIN/1.73 M^2
EST. GFR  (NON AFRICAN AMERICAN): >60 ML/MIN/1.73 M^2
GLUCOSE SERPL-MCNC: 92 MG/DL
HCT VFR BLD AUTO: 35.3 %
HGB BLD-MCNC: 11.3 G/DL
INR PPP: 1.1
LYMPHOCYTES # BLD AUTO: 1.5 K/UL
LYMPHOCYTES NFR BLD: 28.9 %
MCH RBC QN AUTO: 25.3 PG
MCHC RBC AUTO-ENTMCNC: 32 G/DL
MCV RBC AUTO: 79 FL
MONOCYTES # BLD AUTO: 0.6 K/UL
MONOCYTES NFR BLD: 12.4 %
NEUTROPHILS # BLD AUTO: 2.7 K/UL
NEUTROPHILS NFR BLD: 53.3 %
PLATELET # BLD AUTO: 181 K/UL
PMV BLD AUTO: 11.5 FL
POTASSIUM SERPL-SCNC: 3.6 MMOL/L
PROTHROMBIN TIME: 11.1 SEC
RBC # BLD AUTO: 4.47 M/UL
SODIUM SERPL-SCNC: 141 MMOL/L
WBC # BLD AUTO: 5.01 K/UL

## 2019-02-06 PROCEDURE — 85025 COMPLETE CBC W/AUTO DIFF WBC: CPT | Mod: HCNC

## 2019-02-06 PROCEDURE — 85610 PROTHROMBIN TIME: CPT | Mod: HCNC

## 2019-02-06 PROCEDURE — 93010 ELECTROCARDIOGRAM REPORT: CPT | Mod: HCNC,,, | Performed by: INTERNAL MEDICINE

## 2019-02-06 PROCEDURE — 93010 EKG 12-LEAD: ICD-10-PCS | Mod: HCNC,,, | Performed by: INTERNAL MEDICINE

## 2019-02-06 PROCEDURE — 93005 ELECTROCARDIOGRAM TRACING: CPT | Mod: HCNC

## 2019-02-06 PROCEDURE — 80048 BASIC METABOLIC PNL TOTAL CA: CPT | Mod: HCNC

## 2019-02-06 NOTE — DISCHARGE INSTRUCTIONS
Your surgery is scheduled for _Wednesday Feb. 13, 2019_.    Call [phone] between 2 p.m. and 5 p.m. on   Tuesday__ to find out your arrival time for the day of your surgery.      Please report to SAME DAY SURGERY UNIT on the 2nd FLOOR at _______ a.m.  Use front door entrance. The doors open at 0530 am.          INSTRUCTIONS IMPORTANT!!!  ¨ Do not eat or drink after 12 midnight-including water. OK to brush teeth, no   gum, candy or mints!    ¨ Take only these medicines with a small swallow of water-morning of surgery.  Take Altace and Coreg with swallow of water          _x___  Prep instructions:  SHOWER     _x___  Please shower using Hibiclens soap the night before AND  the morning of your surgery/procedure. Do not use Hibiclens on your face or genitals               If your surgery is around your belly button (Navel) be sure to wash inside your belly button also. Rinse hibiclens off completely.  _x___  No shaving of procedural area at least 4-5 days before surgery due to increased risk of skin irritation and/or possible infection.  ____  Do not wear makeup, including mascara. WEARING EYE MAKEUP MAY LEAD TO SERIOUS EYE INJURY during surgery.  _x___  No powder, lotions or creams to your body.  _x___  You may wear only deodorant on the day of surgery.  _x___  Please remove all jewelry, including piercings and leave at home.  _x___  No money or valuables needed. Please leave at home.  You may bring your cell phone.  ____  Please bring any documents given by your doctor.  _x___  If going home the same day, arrange for a ride home. You will not be able to   drive if Anesthesia was used.  ____  Children under 18 years require a parent / guardian present the entire time   they are in surgery / recovery.  _x___  Wear loose fitting clothing. Allow for dressings, bandages.  _x___  Stop Aspirin, Ibuprofen, Motrin and Aleve at least 3-5 days before  surgery, unless otherwise instructed by your doctor, or the nurse.               You MAY use Tylenol/acetaminophen until day of surgery.  ____  If you take diabetic medication, do not take am of surgery unless instructed by   Doctor.  _x___  Call MD for temperature above 101 degrees.        _x___ Stop taking any Fish Oil supplement or any Vitamins that contain Vitamin   E at least 5 days prior to surgery.          I have read or had read and explained to me, and understand the above information.  Additional comments or instructions:Please call   [phone] if you have any questions regarding the instructions above.

## 2019-02-12 ENCOUNTER — ANESTHESIA EVENT (OUTPATIENT)
Dept: CARDIOLOGY | Facility: HOSPITAL | Age: 64
End: 2019-02-12
Payer: MEDICARE

## 2019-02-13 ENCOUNTER — ANESTHESIA (OUTPATIENT)
Dept: CARDIOLOGY | Facility: HOSPITAL | Age: 64
End: 2019-02-13
Payer: MEDICARE

## 2019-02-13 ENCOUNTER — HOSPITAL ENCOUNTER (OUTPATIENT)
Facility: HOSPITAL | Age: 64
Discharge: HOME | End: 2019-02-14
Attending: INTERNAL MEDICINE | Admitting: INTERNAL MEDICINE
Payer: MEDICARE

## 2019-02-13 DIAGNOSIS — I50.22 CHRONIC SYSTOLIC CONGESTIVE HEART FAILURE: ICD-10-CM

## 2019-02-13 DIAGNOSIS — I50.42 CHRONIC COMBINED SYSTOLIC AND DIASTOLIC CONGESTIVE HEART FAILURE: Primary | ICD-10-CM

## 2019-02-13 LAB
ABO + RH BLD: NORMAL
BLD GP AB SCN CELLS X3 SERPL QL: NORMAL

## 2019-02-13 PROCEDURE — C1777 LEAD, AICD, ENDO SINGLE COIL: HCPCS | Mod: HCNC | Performed by: INTERNAL MEDICINE

## 2019-02-13 PROCEDURE — 99900035 HC TECH TIME PER 15 MIN (STAT): Mod: HCNC

## 2019-02-13 PROCEDURE — 93010 ELECTROCARDIOGRAM REPORT: CPT | Mod: HCNC,,, | Performed by: INTERNAL MEDICINE

## 2019-02-13 PROCEDURE — 63600175 PHARM REV CODE 636 W HCPCS: Mod: HCNC | Performed by: INTERNAL MEDICINE

## 2019-02-13 PROCEDURE — 25000003 PHARM REV CODE 250: Mod: HCNC | Performed by: NURSE ANESTHETIST, CERTIFIED REGISTERED

## 2019-02-13 PROCEDURE — 94761 N-INVAS EAR/PLS OXIMETRY MLT: CPT | Mod: HCNC

## 2019-02-13 PROCEDURE — 33249 INSJ/RPLCMT DEFIB W/LEAD(S): CPT | Mod: HCNC,,, | Performed by: INTERNAL MEDICINE

## 2019-02-13 PROCEDURE — 93010 EKG 12-LEAD: ICD-10-PCS | Mod: HCNC,,, | Performed by: INTERNAL MEDICINE

## 2019-02-13 PROCEDURE — 37000008 HC ANESTHESIA 1ST 15 MINUTES: Mod: HCNC | Performed by: INTERNAL MEDICINE

## 2019-02-13 PROCEDURE — 27100023 HC RADIAL ARTERY SENSOR/TENSYS: Mod: HCNC | Performed by: NURSE ANESTHETIST, CERTIFIED REGISTERED

## 2019-02-13 PROCEDURE — 36620 INSERTION CATHETER ARTERY: CPT | Mod: HCNC,59,, | Performed by: ANESTHESIOLOGY

## 2019-02-13 PROCEDURE — C1887 CATHETER, GUIDING: HCPCS | Mod: HCNC | Performed by: INTERNAL MEDICINE

## 2019-02-13 PROCEDURE — C1892 INTRO/SHEATH,FIXED,PEEL-AWAY: HCPCS | Mod: HCNC | Performed by: INTERNAL MEDICINE

## 2019-02-13 PROCEDURE — 33225 L VENTRIC PACING LEAD ADD-ON: CPT | Mod: HCNC,,, | Performed by: INTERNAL MEDICINE

## 2019-02-13 PROCEDURE — D9220A PRA ANESTHESIA: Mod: HCNC,ANES,, | Performed by: ANESTHESIOLOGY

## 2019-02-13 PROCEDURE — 63600175 PHARM REV CODE 636 W HCPCS: Mod: HCNC | Performed by: NURSE ANESTHETIST, CERTIFIED REGISTERED

## 2019-02-13 PROCEDURE — 33249 INSJ/RPLCMT DEFIB W/LEAD(S): CPT | Mod: HCNC | Performed by: INTERNAL MEDICINE

## 2019-02-13 PROCEDURE — C1769 GUIDE WIRE: HCPCS | Mod: HCNC | Performed by: INTERNAL MEDICINE

## 2019-02-13 PROCEDURE — 25000003 PHARM REV CODE 250: Mod: HCNC | Performed by: INTERNAL MEDICINE

## 2019-02-13 PROCEDURE — C1894 INTRO/SHEATH, NON-LASER: HCPCS | Mod: HCNC | Performed by: INTERNAL MEDICINE

## 2019-02-13 PROCEDURE — 33225 PR INSRT PACING ELECT,AT INSERT NEW DEVICE: ICD-10-PCS | Mod: HCNC,,, | Performed by: INTERNAL MEDICINE

## 2019-02-13 PROCEDURE — 93005 ELECTROCARDIOGRAM TRACING: CPT | Mod: HCNC

## 2019-02-13 PROCEDURE — 36415 COLL VENOUS BLD VENIPUNCTURE: CPT | Mod: HCNC

## 2019-02-13 PROCEDURE — 37000009 HC ANESTHESIA EA ADD 15 MINS: Mod: HCNC | Performed by: INTERNAL MEDICINE

## 2019-02-13 PROCEDURE — 25500020 PHARM REV CODE 255: Mod: HCNC | Performed by: INTERNAL MEDICINE

## 2019-02-13 PROCEDURE — 36620 PR INSERT CATH,ART,PERCUT,SHORTTERM: ICD-10-PCS | Mod: HCNC,59,, | Performed by: ANESTHESIOLOGY

## 2019-02-13 PROCEDURE — 27201423 OPTIME MED/SURG SUP & DEVICES STERILE SUPPLY: Mod: HCNC | Performed by: INTERNAL MEDICINE

## 2019-02-13 PROCEDURE — D9220A PRA ANESTHESIA: ICD-10-PCS | Mod: HCNC,ANES,, | Performed by: ANESTHESIOLOGY

## 2019-02-13 PROCEDURE — C1898 LEAD, PMKR, OTHER THAN TRANS: HCPCS | Mod: HCNC | Performed by: INTERNAL MEDICINE

## 2019-02-13 PROCEDURE — C1882 AICD, OTHER THAN SING/DUAL: HCPCS | Mod: HCNC | Performed by: INTERNAL MEDICINE

## 2019-02-13 PROCEDURE — 86850 RBC ANTIBODY SCREEN: CPT | Mod: HCNC

## 2019-02-13 PROCEDURE — 33249 PR ICD INSERT SNGL/DUAL W/LEADS: ICD-10-PCS | Mod: HCNC,,, | Performed by: INTERNAL MEDICINE

## 2019-02-13 PROCEDURE — 27000190 HC CPAP FULL FACE MASK W/VALVE: Mod: HCNC

## 2019-02-13 PROCEDURE — 33225 L VENTRIC PACING LEAD ADD-ON: CPT | Mod: HCNC | Performed by: INTERNAL MEDICINE

## 2019-02-13 DEVICE — LEAD 6935M62 QUATTRO SECURE S MRI US
Type: IMPLANTABLE DEVICE | Site: CORONARY | Status: NON-FUNCTIONAL
Brand: SPRINT QUATTRO SECURE S MRI™ SURESCAN™
Removed: 2020-06-17

## 2019-02-13 DEVICE — LEAD 407652 CAPSUREFIX NOVUS US MRI
Type: IMPLANTABLE DEVICE | Site: CORONARY | Status: NON-FUNCTIONAL
Brand: CAPSUREFIX NOVUS MRI™ SURESCAN™
Removed: 2020-06-17

## 2019-02-13 DEVICE — CRTD DTMB1QQ AMPLIA MRI QUAD CRTD US
Type: IMPLANTABLE DEVICE | Site: CHEST  WALL | Status: NON-FUNCTIONAL
Brand: AMPLIA MRI™ QUAD CRT-D SURESCAN™
Removed: 2020-06-17

## 2019-02-13 DEVICE — LEAD 439888 MRI STRAIGHT US
Type: IMPLANTABLE DEVICE | Site: CORONARY | Status: NON-FUNCTIONAL
Brand: ATTAIN PERFORMA™ STRAIGHT MRI SURESCAN™
Removed: 2020-06-17

## 2019-02-13 RX ORDER — CEFAZOLIN SODIUM 1 G/50ML
1 SOLUTION INTRAVENOUS
Status: DISCONTINUED | OUTPATIENT
Start: 2019-02-13 | End: 2019-02-14 | Stop reason: HOSPADM

## 2019-02-13 RX ORDER — HYDROCODONE BITARTRATE AND ACETAMINOPHEN 5; 325 MG/1; MG/1
1 TABLET ORAL EVERY 4 HOURS PRN
Status: DISCONTINUED | OUTPATIENT
Start: 2019-02-13 | End: 2019-02-14 | Stop reason: HOSPADM

## 2019-02-13 RX ORDER — MIDAZOLAM HYDROCHLORIDE 1 MG/ML
INJECTION, SOLUTION INTRAMUSCULAR; INTRAVENOUS
Status: DISCONTINUED | OUTPATIENT
Start: 2019-02-13 | End: 2019-02-13

## 2019-02-13 RX ORDER — ROCURONIUM BROMIDE 10 MG/ML
INJECTION, SOLUTION INTRAVENOUS
Status: DISCONTINUED | OUTPATIENT
Start: 2019-02-13 | End: 2019-02-13

## 2019-02-13 RX ORDER — SPIRONOLACTONE 25 MG/1
25 TABLET ORAL DAILY
Status: DISCONTINUED | OUTPATIENT
Start: 2019-02-14 | End: 2019-02-14 | Stop reason: HOSPADM

## 2019-02-13 RX ORDER — CEFAZOLIN SODIUM 1 G/3ML
INJECTION, POWDER, FOR SOLUTION INTRAMUSCULAR; INTRAVENOUS
Status: DISCONTINUED | OUTPATIENT
Start: 2019-02-13 | End: 2019-02-13

## 2019-02-13 RX ORDER — EPHEDRINE SULFATE 50 MG/ML
INJECTION, SOLUTION INTRAVENOUS
Status: DISCONTINUED | OUTPATIENT
Start: 2019-02-13 | End: 2019-02-13

## 2019-02-13 RX ORDER — FENTANYL CITRATE 50 UG/ML
INJECTION, SOLUTION INTRAMUSCULAR; INTRAVENOUS
Status: DISCONTINUED | OUTPATIENT
Start: 2019-02-13 | End: 2019-02-13

## 2019-02-13 RX ORDER — SODIUM CHLORIDE 9 MG/ML
INJECTION, SOLUTION INTRAVENOUS CONTINUOUS PRN
Status: DISCONTINUED | OUTPATIENT
Start: 2019-02-13 | End: 2019-02-13

## 2019-02-13 RX ORDER — ETOMIDATE 2 MG/ML
INJECTION INTRAVENOUS
Status: DISCONTINUED | OUTPATIENT
Start: 2019-02-13 | End: 2019-02-13

## 2019-02-13 RX ORDER — LIDOCAINE HCL/PF 100 MG/5ML
SYRINGE (ML) INTRAVENOUS
Status: DISCONTINUED | OUTPATIENT
Start: 2019-02-13 | End: 2019-02-13

## 2019-02-13 RX ORDER — LIDOCAINE HYDROCHLORIDE AND EPINEPHRINE 20; 10 MG/ML; UG/ML
INJECTION, SOLUTION INFILTRATION; PERINEURAL
Status: DISCONTINUED | OUTPATIENT
Start: 2019-02-13 | End: 2019-02-13 | Stop reason: HOSPADM

## 2019-02-13 RX ORDER — GLYCOPYRROLATE 0.2 MG/ML
INJECTION INTRAMUSCULAR; INTRAVENOUS
Status: DISCONTINUED | OUTPATIENT
Start: 2019-02-13 | End: 2019-02-13

## 2019-02-13 RX ORDER — CEFAZOLIN SODIUM 1 G/3ML
1 INJECTION, POWDER, FOR SOLUTION INTRAMUSCULAR; INTRAVENOUS
Status: DISCONTINUED | OUTPATIENT
Start: 2019-02-13 | End: 2019-02-13

## 2019-02-13 RX ORDER — FUROSEMIDE 40 MG/1
80 TABLET ORAL DAILY
Status: DISCONTINUED | OUTPATIENT
Start: 2019-02-14 | End: 2019-02-14 | Stop reason: HOSPADM

## 2019-02-13 RX ORDER — ACETAMINOPHEN 325 MG/1
650 TABLET ORAL EVERY 4 HOURS PRN
Status: DISCONTINUED | OUTPATIENT
Start: 2019-02-13 | End: 2019-02-14 | Stop reason: HOSPADM

## 2019-02-13 RX ORDER — SUCCINYLCHOLINE CHLORIDE 20 MG/ML
INJECTION INTRAMUSCULAR; INTRAVENOUS
Status: DISCONTINUED | OUTPATIENT
Start: 2019-02-13 | End: 2019-02-13

## 2019-02-13 RX ORDER — RAMIPRIL 2.5 MG/1
2.5 CAPSULE ORAL DAILY
Status: DISCONTINUED | OUTPATIENT
Start: 2019-02-14 | End: 2019-02-14 | Stop reason: HOSPADM

## 2019-02-13 RX ORDER — CARVEDILOL 3.12 MG/1
3.12 TABLET ORAL 2 TIMES DAILY
Status: DISCONTINUED | OUTPATIENT
Start: 2019-02-13 | End: 2019-02-14 | Stop reason: HOSPADM

## 2019-02-13 RX ORDER — NEOSTIGMINE METHYLSULFATE 1 MG/ML
INJECTION, SOLUTION INTRAVENOUS
Status: DISCONTINUED | OUTPATIENT
Start: 2019-02-13 | End: 2019-02-13

## 2019-02-13 RX ORDER — PRAVASTATIN SODIUM 40 MG/1
80 TABLET ORAL DAILY
Status: DISCONTINUED | OUTPATIENT
Start: 2019-02-14 | End: 2019-02-14 | Stop reason: HOSPADM

## 2019-02-13 RX ADMIN — EPHEDRINE SULFATE 5 MG: 50 INJECTION, SOLUTION INTRAMUSCULAR; INTRAVENOUS; SUBCUTANEOUS at 12:02

## 2019-02-13 RX ADMIN — NEOSTIGMINE METHYLSULFATE 5 MG: 1 INJECTION INTRAVENOUS at 01:02

## 2019-02-13 RX ADMIN — ETOMIDATE 20 MG: 2 INJECTION, SOLUTION INTRAVENOUS at 11:02

## 2019-02-13 RX ADMIN — ETOMIDATE 4 MG: 2 INJECTION, SOLUTION INTRAVENOUS at 12:02

## 2019-02-13 RX ADMIN — EPHEDRINE SULFATE 10 MG: 50 INJECTION, SOLUTION INTRAMUSCULAR; INTRAVENOUS; SUBCUTANEOUS at 12:02

## 2019-02-13 RX ADMIN — EPHEDRINE SULFATE 5 MG: 50 INJECTION, SOLUTION INTRAMUSCULAR; INTRAVENOUS; SUBCUTANEOUS at 01:02

## 2019-02-13 RX ADMIN — SODIUM CHLORIDE: 0.9 INJECTION, SOLUTION INTRAVENOUS at 11:02

## 2019-02-13 RX ADMIN — GLYCOPYRROLATE 0.5 MG: 0.2 INJECTION, SOLUTION INTRAMUSCULAR; INTRAVENOUS at 01:02

## 2019-02-13 RX ADMIN — MIDAZOLAM 2 MG: 1 INJECTION INTRAMUSCULAR; INTRAVENOUS at 11:02

## 2019-02-13 RX ADMIN — CEFAZOLIN SODIUM 1 G: 1 SOLUTION INTRAVENOUS at 09:02

## 2019-02-13 RX ADMIN — EPHEDRINE SULFATE 5 MG: 50 INJECTION, SOLUTION INTRAMUSCULAR; INTRAVENOUS; SUBCUTANEOUS at 11:02

## 2019-02-13 RX ADMIN — CARVEDILOL 3.12 MG: 3.12 TABLET, FILM COATED ORAL at 09:02

## 2019-02-13 RX ADMIN — LIDOCAINE HYDROCHLORIDE 70 MG: 20 INJECTION, SOLUTION INTRAVENOUS at 11:02

## 2019-02-13 RX ADMIN — FENTANYL CITRATE 100 MCG: 50 INJECTION, SOLUTION INTRAMUSCULAR; INTRAVENOUS at 11:02

## 2019-02-13 RX ADMIN — CEFAZOLIN 2 G: 1 INJECTION, POWDER, FOR SOLUTION INTRAVENOUS at 11:02

## 2019-02-13 RX ADMIN — SUCCINYLCHOLINE CHLORIDE 140 MG: 20 INJECTION, SOLUTION INTRAMUSCULAR; INTRAVENOUS at 11:02

## 2019-02-13 RX ADMIN — ROCURONIUM BROMIDE 25 MG: 10 INJECTION, SOLUTION INTRAVENOUS at 12:02

## 2019-02-13 RX ADMIN — EPHEDRINE SULFATE 10 MG: 50 INJECTION, SOLUTION INTRAMUSCULAR; INTRAVENOUS; SUBCUTANEOUS at 01:02

## 2019-02-13 NOTE — Clinical Note
The site was marked. Prepped: left chest. Prepped with: ChloraPrep. The site was clipped. The patient was draped.

## 2019-02-13 NOTE — ANESTHESIA PREPROCEDURE EVALUATION
02/13/2019  Papito Bhakta is a 63 y.o., male.    Anesthesia Evaluation    I have reviewed the Patient Summary Reports.    I have reviewed the Nursing Notes.   I have reviewed the Medications.     Review of Systems  Anesthesia Hx:  No problems with previous Anesthesia Denies Hx of Anesthetic complications  Neg history of prior surgery. Denies Family Hx of Anesthesia complications.   Denies Personal Hx of Anesthesia complications.   Social:  Former Smoker    Hematology/Oncology:  Hematology Normal   Oncology Normal     EENT/Dental:EENT/Dental Normal   Cardiovascular:   Exercise tolerance: good Hypertension CHF hyperlipidemia · Limited study to assess function  · Severely decreased left ventricular systolic function. The estimated ejection fraction is 10%  · Left ventricular diastolic dysfunction.  · Severely reduced right ventricular systolic function.  · No thrombus   Pulmonary:   Pneumonia    Renal/:  Renal/ Normal     Hepatic/GI:  Hepatic/GI Normal    Musculoskeletal:  Musculoskeletal Normal    Neurological:  Neurology Normal    Endocrine:  Endocrine Normal    Dermatological:  Skin Normal    Psych:  Psychiatric Normal           Physical Exam  General:  Well nourished, Obesity    Airway/Jaw/Neck:  Airway Findings: Mouth Opening: Normal General Airway Assessment: Adult  Mallampati: II  TM Distance: Normal, at least 6 cm         Dental:  Dental Findings:No upper teeth   Chest/Lungs:  Chest/Lungs Clear    Heart/Vascular:  Heart Findings: Normal Heart murmur: negative       Mental Status:  Mental Status Findings:  Cooperative, Alert and Oriented         Anesthesia Plan  Type of Anesthesia, risks & benefits discussed:  Anesthesia Type:  general  Patient's Preference:   Intra-op Monitoring Plan: standard ASA monitors and arterial line  Intra-op Monitoring Plan Comments:   Post Op Pain Control Plan: multimodal  analgesia  Post Op Pain Control Plan Comments:   Induction:   IV  Beta Blocker:  Patient is not currently on a Beta-Blocker (No further documentation required).       Informed Consent: Patient understands risks and agrees with Anesthesia plan.  Questions answered. Anesthesia consent signed with patient.  ASA Score: 4     Day of Surgery Review of History & Physical:            Ready For Surgery From Anesthesia Perspective.

## 2019-02-13 NOTE — H&P
HPI:  Here for follow-up of nonischemic cardiomyopathy.   he underwent recent admission for acute on chronic systolic heart failure.  He had respiratory failure requiring intubation and was placed on inotrope intermittently.  He currently is better and we had extensively discussed outpatient placement biventricular ICD which he is agreeable to schedule today.  He denies any current PND, orthopnea or lower extremity edema.  He has experiencing dizziness, presyncope or syncope.        Review of Systems   Constitution: Negative.   HENT: Negative.    Eyes: Negative.    Cardiovascular: Positive for leg swelling. Negative for chest pain, dyspnea on exertion, irregular heartbeat, near-syncope, orthopnea, palpitations, paroxysmal nocturnal dyspnea and syncope.   Respiratory: Negative for shortness of breath.    Skin: Negative.    Musculoskeletal: Negative.    Gastrointestinal: Negative for abdominal pain, constipation and diarrhea.   Genitourinary: Negative for dysuria.   Neurological: Negative for dizziness.   Psychiatric/Behavioral: Negative.       Objective:    Physical Exam   Constitutional: He is oriented to person, place, and time. He appears well-developed and well-nourished. No distress.   HENT:   Head: Normocephalic and atraumatic.   Eyes: Conjunctivae and EOM are normal. Pupils are equal, round, and reactive to light.   Neck: Normal range of motion. Neck supple. No thyromegaly present.   Cardiovascular: Normal rate, regular rhythm and normal heart sounds.   No murmur heard.  Pulmonary/Chest: Effort normal and breath sounds normal. No respiratory distress. He has no wheezes. He has no rales. He exhibits no tenderness.   Abdominal: Soft. Bowel sounds are normal.   Musculoskeletal: He exhibits edema.   Neurological: He is alert and oriented to person, place, and time.   Skin: Skin is warm and dry.   Psychiatric: He has a normal mood and affect. His behavior is normal.        ECHO:  1-19  · Limited study to assess  function  · Severely decreased left ventricular systolic function. The estimated ejection fraction is 10%  · Left ventricular diastolic dysfunction.  · Severely reduced right ventricular systolic function.  · No thrombus     Right/left heart catheterization:  2-18  B. Summary/Post-Operative Diagnosis       Normal coronary arteries.    Systolic dysfunction.    Low right and left Filling Pressures.    Mild Pulmonary Hypertension.  Assessment:       1. Chronic systolic congestive heart failure    2. Acute respiratory failure with hypoxia    3. Essential hypertension    4. BMI 40.0-44.9, adult    5. Other hyperlipidemia       Plan:       -Currently stable without any signs of central congestion, continue med therapy  -now agreeable to a biventricular ICD placement post hospitalization  -life expectancy greater than 1 year, nonischemic dilated cardiomyopathy with QRS greater than 120 milliseconds and life expectancy greater than 1 year with New [place] heart Association class 3  -Chronic lower extremity edema 2nd venous stasis is stable on current medicines, unable to tolerate compression stockings currently  -okay to continue maintenance Lasix as is  -Counseled on sodium restriction and diet/exercise  -Counseled on diet and exercise     Return to clinic after the procedure     **Risks/benefits of the procedure were d/w the patient including bleeding, infection, ptx, etc.  Patient understands and wishes to proceed.  Consent was placed on the chart.

## 2019-02-13 NOTE — Clinical Note
15 ml injected throughout the case. 35 mL total wasted during the case. 50 mL total used in the case.

## 2019-02-13 NOTE — TRANSFER OF CARE
"Anesthesia Transfer of Care Note    Patient: Papito Bhakta    Procedure(s) Performed: Procedure(s) (LRB):  INSERTION, ICD, BIVENTRICULAR (N/A)    Patient location: PACU    Anesthesia Type: general    Transport from OR: Transported from OR on 6-10 L/min O2 by face mask with adequate spontaneous ventilation    Post pain: adequate analgesia    Post assessment: no apparent anesthetic complications    Post vital signs: stable    Level of consciousness: awake, alert and oriented    Nausea/Vomiting: no nausea/vomiting    Complications: none    Transfer of care protocol was followedComments: Resp @ BS to place pt on Bipap, aao, no pain      Last vitals:   Visit Vitals  BP (!) 146/88   Pulse 82   Temp 36.6 °C (97.8 °F)   Resp (!) 28   Ht 6' 5" (1.956 m)   Wt (!) 136.8 kg (301 lb 9 oz)   SpO2 100%   BMI 35.76 kg/m²     "

## 2019-02-13 NOTE — BRIEF OP NOTE
[facility]  Brief Operative Note    SUMMARY     Surgery Date: 2/13/2019     Surgeon(s) and Role:     * Shailesh Eng MD - Primary    Assisting Surgeon: None    Pre-op Diagnosis:  Chronic systolic congestive heart failure [I50.22]    Post-op Diagnosis:  Post-Op Diagnosis Codes:     * Chronic systolic congestive heart failure [I50.22]    Procedure(s) (LRB):  INSERTION, ICD, BIVENTRICULAR (N/A)    Anesthesia: Monitor Anesthesia Care    Description of Procedure:  Biventricular ICD placement    Description of the findings of the procedure:  Successful placement via left subclavian vein with significant narrowing of QRS post implantation and synchronization with LV pacing    Estimated Blood Loss:  50 cc         Specimens:   Specimen (12h ago, onward)    None

## 2019-02-14 VITALS
SYSTOLIC BLOOD PRESSURE: 113 MMHG | DIASTOLIC BLOOD PRESSURE: 65 MMHG | HEART RATE: 77 BPM | OXYGEN SATURATION: 95 % | HEIGHT: 77 IN | WEIGHT: 301.56 LBS | RESPIRATION RATE: 18 BRPM | BODY MASS INDEX: 35.61 KG/M2 | TEMPERATURE: 99 F

## 2019-02-14 PROCEDURE — 36620 INSERTION CATHETER ARTERY: CPT | Mod: HCNC | Performed by: ANESTHESIOLOGY

## 2019-02-14 PROCEDURE — 99900035 HC TECH TIME PER 15 MIN (STAT): Mod: HCNC

## 2019-02-14 PROCEDURE — 25000003 PHARM REV CODE 250: Mod: HCNC | Performed by: INTERNAL MEDICINE

## 2019-02-14 PROCEDURE — 63600175 PHARM REV CODE 636 W HCPCS: Mod: HCNC | Performed by: INTERNAL MEDICINE

## 2019-02-14 RX ORDER — HYDROCODONE BITARTRATE AND ACETAMINOPHEN 5; 325 MG/1; MG/1
1 TABLET ORAL EVERY 4 HOURS PRN
Qty: 30 TABLET | Refills: 0 | Status: SHIPPED | OUTPATIENT
Start: 2019-02-14 | End: 2019-09-03 | Stop reason: ALTCHOICE

## 2019-02-14 RX ADMIN — CEFAZOLIN SODIUM 1 G: 1 SOLUTION INTRAVENOUS at 05:02

## 2019-02-14 RX ADMIN — FUROSEMIDE 80 MG: 40 TABLET ORAL at 08:02

## 2019-02-14 RX ADMIN — RAMIPRIL 2.5 MG: 2.5 CAPSULE ORAL at 08:02

## 2019-02-14 RX ADMIN — SPIRONOLACTONE 25 MG: 25 TABLET ORAL at 08:02

## 2019-02-14 RX ADMIN — PRAVASTATIN SODIUM 80 MG: 40 TABLET ORAL at 08:02

## 2019-02-14 RX ADMIN — CARVEDILOL 3.12 MG: 3.12 TABLET, FILM COATED ORAL at 08:02

## 2019-02-14 NOTE — ANESTHESIA PROCEDURE NOTES
Arterial    Diagnosis: CHF    Patient location during procedure: done out of OR  Procedure start time: 2/13/2019 1:38 PM  Timeout: 2/13/2019 1:38 PM  Procedure end time: 2/13/2019 1:39 PM  Staffing  Anesthesiologist: Tasia Donovan MD  Performed: anesthesiologist   Anesthesiologist was present at the time of the procedure.  Preanesthetic Checklist  Completed: patient identified, site marked, surgical consent, pre-op evaluation, timeout performed, IV checked, risks and benefits discussed, monitors and equipment checked and anesthesia consent givenArterial  Skin Prep: chlorhexidine gluconate  Local Infiltration: lidocaine  Orientation: right  Location: femoral  Catheter Size: 20 G  Catheter placement by Ultrasound guidance. Heme positive aspiration all ports.  Vessel Caliber: small, patent  Needle advanced into vessel with real time Ultrasound guidance.Insertion Attempts: 1  Assessment  Dressing: secured with tape and tegaderm  Patient: Tolerated well

## 2019-02-14 NOTE — PROGRESS NOTES
Report received from off going nurse, LAUREN Olmos. Patient AAO. No signs of distress noted. Call light in reach. Bed low and locked. Will continue to monitor.

## 2019-02-14 NOTE — PROGRESS NOTES
Patient is discharged. Telemetry removed. IV removed, tip intact. Discharged teaching given and understood. No questions asked. Sling remains in place per MD. Printed script given as well.

## 2019-02-14 NOTE — PROGRESS NOTES
Follow-up Information     Shailesh Eng MD On 2/27/2019.    Specialties:  INTERVENTIONAL CARDIOLOGY, Cardiology  Why:  Outpatient Services Cardiology Follow-Up Wednesday at 9:20 AM.  Contact information:  [address]  [phone]               PLEASE BRING TO ALL FOLLOW UP APPOINTMENTS:   A COPY YOUR DISCHARGE INSTRUCTIONS, Any new MEDICINES YOU ARE CURRENTLY TAKING IN THEIR ORIGINAL BOTTLES  And IDENTIFICATION AND INSURANCE CARD     **PLEASE ARRIVE 15 MINUTES AHEAD OF SCHEDULED APPOINTMENT TIME   ++PLEASE CALL 24 HOURS IN ADVANCE IF YOU MUST RESCHEDULE YOUR APPOINTMENT DAY AND/OR TIME     [facility]    WRITTEN HEALTHCARE AND DISCHARGE INFORMATION                        Help at Home           [phone]  After discharge for assistance [facility] On Call Nurse Care Line 24/7  Assistance    Things You are responsible For To Manage Your Care At Home:  1.    Getting your prescriptions filled   2.    Taking your medications as directed, DO NOT MISS ANY DOSES!  3.    Going to your follow-up doctor appointment. This is important because it  allow the doctor to monitor your progress and determine if  any changes need to made to your treatment plan.     Thank you for choosing [facility] for your care.  Please answer any calls you may receive from [facility] we want to continue to support you as you manage your healthcare needs. [facility] is happy to have the opportunity to serve you.     Sincerely,  Your [facility] Team,  [place], [place];  [phone]

## 2019-02-14 NOTE — PROGRESS NOTES
"1040 Cardiology post-op f/u appt already scheduled with Dr. Eng on 02/26/19 at 9:20 AM.    1105 [place] reviewed follow up appointment information as well as  "Post op discharge instructions" handout with patient using teach back while informing patient to concentrate on signs and symptoms to look for after discharge that would flag him that he needs to contact the doctor. Patient is in agreement and verbalized an understanding. Placed discharge information in blue discharge folder.  TN also reviewed patient responsibility checklist with him using teach back.     Patient was able to verbalize his responsibilities after discharge to manager his care at home being   1. Going to follow up appointments   2.  rx from the pharmacy when discharged  3. Taking his medication as prescribed     Patient's nurse, Lacey , informed that patient can discharge from  standpoint. Nurse can now complete discharge and review signs and symptoms teaching.   "

## 2019-02-14 NOTE — ANESTHESIA POSTPROCEDURE EVALUATION
"Anesthesia Post Evaluation    Patient: Papito Bhakta    Procedure(s) Performed: Procedure(s) (LRB):  INSERTION, ICD, BIVENTRICULAR (N/A)    Final Anesthesia Type: general  Patient location during evaluation: PACU  Patient participation: Yes- Able to Participate  Level of consciousness: awake and alert, oriented and awake  Post-procedure vital signs: reviewed and stable  Airway patency: patent  PONV status at discharge: No PONV  Anesthetic complications: no      Cardiovascular status: blood pressure returned to baseline  Respiratory status: unassisted, spontaneous ventilation and room air  Hydration status: euvolemic  Follow-up not needed.        Visit Vitals  /65 (BP Location: Right arm, Patient Position: Lying)   Pulse 77   Temp 36.9 °C (98.5 °F) (Oral)   Resp 18   Ht 6' 5" (1.956 m)   Wt (!) 136.8 kg (301 lb 9 oz)   SpO2 95%   BMI 35.76 kg/m²       Pain/Shira Score: Shira Score: 10 (2/13/2019  3:48 PM)        "

## 2019-02-14 NOTE — DISCHARGE SUMMARY
[facility] Hospital Medicine  Discharge Summary      Patient Name: Papito Bhakta  MRN: [id number]  Admission Date: 2/13/2019  Hospital Length of Stay: 0 days  Discharge Date and Time:  02/14/2019 10:05 AM  Attending Physician: Shailesh Eng MD   Discharging Provider: Shailesh Eng MD  Primary Care Provider: Brynn To MD        HPI:  Patient is a 63-year-old male with history of nonischemic dilated cardiomyopathy with left bundle-branch block with QRS greater than 120 milliseconds was brought in electively for biventricular ICD placement for primary prevention.  See admission H&P for full details.    Procedure(s) (LRB):  INSERTION, ICD, BIVENTRICULAR (N/A)      Hospital Course:  Patient was electively admitted underwent the procedure successfully.  He was observed overnight for any complications.  He will continue all his current medications apart from holding aspirin until staple removal.  He will wear his arm sling until that time.  He received 2 doses antibiotics overnight IV.  Chest x-ray showed no postoperative complications.  He had significant narrowing of his QRS post CS lead placement.  He will need device interrogation in 1 month.    Consults:  none    Final Active Diagnoses:    Diagnosis Date Noted POA    PRINCIPAL PROBLEM:  Chronic systolic congestive heart failure [I50.22] 02/13/2019 Yes      Problems Resolved During this Admission:      Discharged Condition: good    Disposition:     Follow Up:  Follow-up Information     Shailesh Eng MD In 1 week.    Specialties:  INTERVENTIONAL CARDIOLOGY, Cardiology  Contact information:  [address] [place], [place] [id number] [phone]                 Patient Instructions:   Diet cardiac  Activity as tolerated  Routine post ICD instructions given    Medications:  Reconciled Home Medications:      Medication List      START taking these medications    HYDROcodone-acetaminophen 5-325 mg per tablet  Commonly known as:   NORCO  Take 1 tablet by mouth every 4 (four) hours as needed (pain 5-10/10 pain scale).        CONTINUE taking these medications    carvedilol 3.125 MG tablet  Commonly known as:  COREG  Take 1 tablet (3.125 mg total) by mouth 2 (two) times daily.     furosemide 80 MG tablet  Commonly known as:  LASIX  Take 1 tablet (80 mg total) by mouth once daily.     pravastatin 80 MG tablet  Commonly known as:  PRAVACHOL  Take 1 tablet (80 mg total) by mouth once daily.     ramipril 2.5 MG capsule  Commonly known as:  ALTACE  Take 1 capsule (2.5 mg total) by mouth once daily.     spironolactone 25 MG tablet  Commonly known as:  ALDACTONE  Take 1 tablet (25 mg total) by mouth once daily.        STOP taking these medications    aspirin 325 MG tablet            Significant Diagnostic Studies: Radiology: X-Ray: CXR: X-Ray Chest 1 View (CXR): No results found for this visit on 02/13/19.    Pending Diagnostic Studies:     None        Indwelling Lines/Drains at time of discharge:   Lines/Drains/Airways     Arterial Line                 Arterial Line 02/13/19 1579 less than 1 day                Time spent on the discharge of patient: 30 minutes  Patient was seen and examined on the date of discharge and determined to be suitable for discharge.         Shailesh Eng MD  Department of Hospital Medicine  [facility]

## 2019-02-14 NOTE — PLAN OF CARE
02/14/19 1108   Final Note   Assessment Type Final Discharge Note   Anticipated Discharge Disposition Home   What phone number can be called within the next 1-3 days to see how you are doing after discharge? (Listed in chart)   Hospital Follow Up  Appt(s) scheduled? Yes   Discharge plans and expectations educations in teach back method with documentation complete? Yes   Right Care Referral Info   Post Acute Recommendation No Care

## 2019-02-14 NOTE — PLAN OF CARE
Problem: Fall Injury Risk  Goal: Absence of Fall and Fall-Related Injury  Outcome: Ongoing (interventions implemented as appropriate)  Intervention: Identify and Manage Contributors to Fall Injury Risk   02/14/19 1129   Manage Acute Allergic Reaction   Medication Review/Management medications reviewed   Identify and Manage Contributors to Fall Injury Risk   Self-Care Promotion independence encouraged     Intervention: Promote Injury-Free Environment   02/14/19 1129   Optimize [place] and Functional Mobility   Environmental Safety Modification assistive device/personal items within reach   Optimize Balance and Safe Activity   Safety Promotion/Fall Prevention assistive device/personal item within reach;side rails raised x 2

## 2019-02-22 RX ORDER — PRAVASTATIN SODIUM 80 MG/1
TABLET ORAL
Qty: 90 TABLET | Refills: 3 | Status: SHIPPED | OUTPATIENT
Start: 2019-02-22 | End: 2019-10-24 | Stop reason: SDUPTHER

## 2019-02-27 ENCOUNTER — OFFICE VISIT (OUTPATIENT)
Dept: CARDIOLOGY | Facility: CLINIC | Age: 64
End: 2019-02-27
Payer: MEDICARE

## 2019-02-27 VITALS
OXYGEN SATURATION: 93 % | SYSTOLIC BLOOD PRESSURE: 122 MMHG | WEIGHT: 299.81 LBS | BODY MASS INDEX: 35.55 KG/M2 | DIASTOLIC BLOOD PRESSURE: 78 MMHG | HEART RATE: 75 BPM | RESPIRATION RATE: 16 BRPM

## 2019-02-27 DIAGNOSIS — I10 ESSENTIAL HYPERTENSION: ICD-10-CM

## 2019-02-27 DIAGNOSIS — E78.49 OTHER HYPERLIPIDEMIA: ICD-10-CM

## 2019-02-27 DIAGNOSIS — I50.22 CHRONIC SYSTOLIC CONGESTIVE HEART FAILURE: Primary | ICD-10-CM

## 2019-02-27 PROCEDURE — 3078F DIAST BP <80 MM HG: CPT | Mod: HCNC,CPTII,S$GLB, | Performed by: INTERNAL MEDICINE

## 2019-02-27 PROCEDURE — 3008F BODY MASS INDEX DOCD: CPT | Mod: HCNC,CPTII,S$GLB, | Performed by: INTERNAL MEDICINE

## 2019-02-27 PROCEDURE — 3078F PR MOST RECENT DIASTOLIC BLOOD PRESSURE < 80 MM HG: ICD-10-PCS | Mod: HCNC,CPTII,S$GLB, | Performed by: INTERNAL MEDICINE

## 2019-02-27 PROCEDURE — 99999 PR PBB SHADOW E&M-EST. PATIENT-LVL III: CPT | Mod: PBBFAC,HCNC,, | Performed by: INTERNAL MEDICINE

## 2019-02-27 PROCEDURE — 99999 PR PBB SHADOW E&M-EST. PATIENT-LVL III: ICD-10-PCS | Mod: PBBFAC,HCNC,, | Performed by: INTERNAL MEDICINE

## 2019-02-27 PROCEDURE — 99024 POSTOP FOLLOW-UP VISIT: CPT | Mod: HCNC,S$GLB,, | Performed by: INTERNAL MEDICINE

## 2019-02-27 PROCEDURE — 99024 PR POST-OP FOLLOW-UP VISIT: ICD-10-PCS | Mod: HCNC,S$GLB,, | Performed by: INTERNAL MEDICINE

## 2019-02-27 PROCEDURE — 3074F SYST BP LT 130 MM HG: CPT | Mod: HCNC,CPTII,S$GLB, | Performed by: INTERNAL MEDICINE

## 2019-02-27 PROCEDURE — 3008F PR BODY MASS INDEX (BMI) DOCUMENTED: ICD-10-PCS | Mod: HCNC,CPTII,S$GLB, | Performed by: INTERNAL MEDICINE

## 2019-02-27 PROCEDURE — 3074F PR MOST RECENT SYSTOLIC BLOOD PRESSURE < 130 MM HG: ICD-10-PCS | Mod: HCNC,CPTII,S$GLB, | Performed by: INTERNAL MEDICINE

## 2019-02-27 NOTE — PROGRESS NOTES
Subjective:    Patient ID:  Papito Bhakta is a 63 y.o. male who presents for follow-up of Post-op Evaluation (ICD)      HPI    Previous history:  Here for follow-up of nonischemic cardiomyopathy.  He recently was admitted the hospital with worsening volume overload.  He is noncompliant with his diet.  He has been taking his medicines but they had increased his Lasix.  He currently denies any PND, orthopnea but has stable lower extremity edema.  Is usually improved as well with elevation.  He denies any dizziness, presyncope or syncope.  He still refuses sleep study and defibrillator placement.    Today:  Here for follow-up of nonischemic cardiomyopathy.  He underwent successful Bi V ICD placement.  He feels a good difference with the device in place.  He is able to get up on treadmill in go where he previously could not.  His wound site looks good without any issues.  He denies any chest pain or palpitations.  He gets shortness of breath with heavy activity but relieved with rest.  He has chronic lower extremity edema.  This is actually improved a little bit with elevation post procedure.  He denies any dizziness, presyncope or syncope.      Review of Systems   Constitution: Negative.   HENT: Negative.    Eyes: Negative.    Cardiovascular: Positive for leg swelling. Negative for chest pain, dyspnea on exertion, irregular heartbeat, near-syncope, orthopnea, palpitations, paroxysmal nocturnal dyspnea and syncope.   Respiratory: Negative for shortness of breath.    Skin: Negative.    Musculoskeletal: Negative.    Gastrointestinal: Negative for abdominal pain, constipation and diarrhea.   Genitourinary: Negative for dysuria.   Neurological: Negative for dizziness.   Psychiatric/Behavioral: Negative.         Objective:    Physical Exam   Constitutional: He is oriented to person, place, and time. He appears well-developed and well-nourished. No distress.   HENT:   Head: Normocephalic and atraumatic.   Eyes: Conjunctivae  and EOM are normal. Pupils are equal, round, and reactive to light.   Neck: Normal range of motion. Neck supple. No thyromegaly present.   Cardiovascular: Normal rate, regular rhythm and normal heart sounds.   No murmur heard.  Pulmonary/Chest: Effort normal and breath sounds normal. No respiratory distress. He has no wheezes. He has no rales. He exhibits no tenderness.   Abdominal: Soft. Bowel sounds are normal.   Musculoskeletal: He exhibits edema.   Neurological: He is alert and oriented to person, place, and time.   Skin: Skin is warm and dry.   Psychiatric: He has a normal mood and affect. His behavior is normal.       ECHO:  1-19  · Limited study to assess function  · Severely decreased left ventricular systolic function. The estimated ejection fraction is 10%  · Left ventricular diastolic dysfunction.  · Severely reduced right ventricular systolic function.  · No thrombus     Right/left heart catheterization:  2-18  B. Summary/Post-Operative Diagnosis       Normal coronary arteries.    Systolic dysfunction.    Low right and left Filling Pressures.    Mild Pulmonary Hypertension.    Assessment:       1. Chronic systolic congestive heart failure    2. Essential hypertension    3. Other hyperlipidemia    4. BMI 40.0-44.9, adult         Plan:       -Currently stable without any signs of central congestion, continue med therapy  -status post Bi V ICD   -Chronic lower extremity edema 2nd venous stasis is stable on current medicines, unable to tolerate compression stockings currently  -okay to continue maintenance Lasix as is  -Counseled on sodium restriction and diet/exercise  -Counseled on diet and exercise     Return to clinic in 1 month with device check

## 2019-02-28 DIAGNOSIS — I50.42 CHRONIC COMBINED SYSTOLIC AND DIASTOLIC CONGESTIVE HEART FAILURE: ICD-10-CM

## 2019-02-28 RX ORDER — RAMIPRIL 2.5 MG/1
CAPSULE ORAL
Qty: 60 CAPSULE | Refills: 1 | Status: SHIPPED | OUTPATIENT
Start: 2019-02-28 | End: 2019-05-01 | Stop reason: SDUPTHER

## 2019-02-28 RX ORDER — SPIRONOLACTONE 25 MG/1
TABLET ORAL
Qty: 90 TABLET | Refills: 3 | Status: SHIPPED | OUTPATIENT
Start: 2019-02-28 | End: 2019-10-24 | Stop reason: SDUPTHER

## 2019-02-28 RX ORDER — CARVEDILOL 3.12 MG/1
TABLET ORAL
Qty: 120 TABLET | Refills: 1 | Status: SHIPPED | OUTPATIENT
Start: 2019-02-28 | End: 2019-07-10 | Stop reason: SDUPTHER

## 2019-03-20 ENCOUNTER — TELEPHONE (OUTPATIENT)
Dept: CARDIOLOGY | Facility: CLINIC | Age: 64
End: 2019-03-20

## 2019-03-20 ENCOUNTER — OFFICE VISIT (OUTPATIENT)
Dept: FAMILY MEDICINE | Facility: CLINIC | Age: 64
End: 2019-03-20
Payer: MEDICARE

## 2019-03-20 VITALS
RESPIRATION RATE: 18 BRPM | OXYGEN SATURATION: 96 % | DIASTOLIC BLOOD PRESSURE: 78 MMHG | BODY MASS INDEX: 35.5 KG/M2 | HEIGHT: 77 IN | TEMPERATURE: 98 F | SYSTOLIC BLOOD PRESSURE: 124 MMHG | WEIGHT: 300.69 LBS | HEART RATE: 75 BPM

## 2019-03-20 DIAGNOSIS — J40 BRONCHITIS: ICD-10-CM

## 2019-03-20 DIAGNOSIS — R09.81 NASAL CONGESTION: ICD-10-CM

## 2019-03-20 DIAGNOSIS — I10 ESSENTIAL HYPERTENSION: Chronic | ICD-10-CM

## 2019-03-20 DIAGNOSIS — I50.42 CHRONIC COMBINED SYSTOLIC AND DIASTOLIC CONGESTIVE HEART FAILURE: Primary | ICD-10-CM

## 2019-03-20 PROBLEM — J96.01 ACUTE RESPIRATORY FAILURE WITH HYPOXIA: Status: RESOLVED | Noted: 2019-01-05 | Resolved: 2019-03-20

## 2019-03-20 PROBLEM — I50.43 ACUTE ON CHRONIC COMBINED SYSTOLIC AND DIASTOLIC CONGESTIVE HEART FAILURE: Status: RESOLVED | Noted: 2019-01-05 | Resolved: 2019-03-20

## 2019-03-20 PROBLEM — I50.22 CHRONIC SYSTOLIC CONGESTIVE HEART FAILURE: Status: RESOLVED | Noted: 2019-02-13 | Resolved: 2019-03-20

## 2019-03-20 PROBLEM — J81.0 ACUTE PULMONARY EDEMA: Status: RESOLVED | Noted: 2019-01-05 | Resolved: 2019-03-20

## 2019-03-20 PROBLEM — J18.9 CAP (COMMUNITY ACQUIRED PNEUMONIA): Status: RESOLVED | Noted: 2019-01-06 | Resolved: 2019-03-20

## 2019-03-20 PROCEDURE — 3078F DIAST BP <80 MM HG: CPT | Mod: HCNC,CPTII,S$GLB, | Performed by: FAMILY MEDICINE

## 2019-03-20 PROCEDURE — 99999 PR PBB SHADOW E&M-EST. PATIENT-LVL III: ICD-10-PCS | Mod: PBBFAC,HCNC,, | Performed by: FAMILY MEDICINE

## 2019-03-20 PROCEDURE — 3074F PR MOST RECENT SYSTOLIC BLOOD PRESSURE < 130 MM HG: ICD-10-PCS | Mod: HCNC,CPTII,S$GLB, | Performed by: FAMILY MEDICINE

## 2019-03-20 PROCEDURE — 99999 PR PBB SHADOW E&M-EST. PATIENT-LVL III: CPT | Mod: PBBFAC,HCNC,, | Performed by: FAMILY MEDICINE

## 2019-03-20 PROCEDURE — 3074F SYST BP LT 130 MM HG: CPT | Mod: HCNC,CPTII,S$GLB, | Performed by: FAMILY MEDICINE

## 2019-03-20 PROCEDURE — 3008F PR BODY MASS INDEX (BMI) DOCUMENTED: ICD-10-PCS | Mod: HCNC,CPTII,S$GLB, | Performed by: FAMILY MEDICINE

## 2019-03-20 PROCEDURE — 3078F PR MOST RECENT DIASTOLIC BLOOD PRESSURE < 80 MM HG: ICD-10-PCS | Mod: HCNC,CPTII,S$GLB, | Performed by: FAMILY MEDICINE

## 2019-03-20 PROCEDURE — 99214 PR OFFICE/OUTPT VISIT, EST, LEVL IV, 30-39 MIN: ICD-10-PCS | Mod: HCNC,S$GLB,, | Performed by: FAMILY MEDICINE

## 2019-03-20 PROCEDURE — 99214 OFFICE O/P EST MOD 30 MIN: CPT | Mod: HCNC,S$GLB,, | Performed by: FAMILY MEDICINE

## 2019-03-20 PROCEDURE — 3008F BODY MASS INDEX DOCD: CPT | Mod: HCNC,CPTII,S$GLB, | Performed by: FAMILY MEDICINE

## 2019-03-20 RX ORDER — FLUTICASONE PROPIONATE 50 MCG
1 SPRAY, SUSPENSION (ML) NASAL DAILY
Qty: 16 G | Refills: 0 | Status: SHIPPED | OUTPATIENT
Start: 2019-03-20 | End: 2019-09-03

## 2019-03-20 RX ORDER — CEPHALEXIN 500 MG/1
500 CAPSULE ORAL EVERY 12 HOURS
Qty: 20 CAPSULE | Refills: 0 | Status: SHIPPED | OUTPATIENT
Start: 2019-03-20 | End: 2019-03-30

## 2019-03-20 RX ORDER — ASPIRIN 325 MG
325 TABLET ORAL DAILY
Status: ON HOLD | COMMUNITY
End: 2020-10-11 | Stop reason: SDUPTHER

## 2019-03-20 NOTE — PROGRESS NOTES
Chief Complaint   Patient presents with    Hypertension    Cough     yellow mucus    Nasal Congestion       HPI  Papito Bhakta is a 63 y.o. male with multiple medical diagnoses as listed in the medical history and problem list that presents for follow-up for HTN and CHF. He has also had his cough return    He has congestion and productive cough with yellow sputum. Has concerns as he was admitted before for CAP requiring intubation. No fever or chills. Some SOB with exertion.   Chronic LE edema.     PAST MEDICAL HISTORY:  Past Medical History:   Diagnosis Date    Anticoagulant long-term use     Aspirin    CHF (congestive heart failure)     Hyperlipidemia     Hypertension        PAST SURGICAL HISTORY:  Past Surgical History:   Procedure Laterality Date    Heart Cath-Bilateral Bilateral 2018    Performed [facility]    INSERTION, ICD, BIVENTRICULAR N/A 2019    Performed by [facility] Eng MD at [facility] CATH LAB    oral extraction  2018    TESTICLE SURGERY         SOCIAL HISTORY:  Social History     Socioeconomic History    Marital status:      Spouse name: Not on file    Number of children: Not on file    Years of education: Not on file    Highest education level: Not on file   Social Needs    Financial resource strain: Not on file    Food insecurity - worry: Not on file    Food insecurity - inability: Not on file    Transportation needs - medical: Not on file    Transportation needs - non-medical: Not on file   Occupational History    Not on file   Tobacco Use    Smoking status: Former Smoker     Packs/day: 0.50     Years: 40.00     Pack years: 20.00     Types: Cigarettes, Cigars     Last attempt to quit:      Years since quittin.2    Smokeless tobacco: Never Used   Substance and Sexual Activity    Alcohol use: Yes     Comment: once a month    Drug use: No    Sexual activity: Yes     Birth control/protection: None     Comment: uses  protection sometimes   Other Topics Concern    Not on file   Social History Narrative    Not on file       FAMILY HISTORY:  Family History   Problem Relation Age of Onset    Epilepsy Mother        ALLERGIES AND MEDICATIONS: updated and reviewed.  Review of patient's allergies indicates:  No Known Allergies  Current Outpatient Medications   Medication Sig Dispense Refill    aspirin 325 MG tablet Take 325 mg by mouth once daily.      carvedilol (COREG) 3.125 MG tablet TAKE 1 TABLET TWICE DAILY 120 tablet 1    furosemide (LASIX) 80 MG tablet Take 1 tablet (80 mg total) by mouth once daily. 180 tablet 3    HYDROcodone-acetaminophen (NORCO) 5-325 mg per tablet Take 1 tablet by mouth every 4 (four) hours as needed (pain 5-10/10 pain scale). 30 tablet 0    pravastatin (PRAVACHOL) 80 MG tablet TAKE 1 TABLET ONE TIME DAILY 90 tablet 3    ramipril (ALTACE) 2.5 MG capsule TAKE 1 CAPSULE EVERY DAY 60 capsule 1    spironolactone (ALDACTONE) 25 MG tablet TAKE 1 TABLET ONE TIME DAILY 90 tablet 3    cephALEXin (KEFLEX) 500 MG capsule Take 1 capsule (500 mg total) by mouth every 12 (twelve) hours. for 10 days 20 capsule 0    fluticasone (FLONASE) 50 mcg/actuation nasal spray 1 spray (50 mcg total) by Each Nare route once daily. 16 g 0     No current facility-administered medications for this visit.        ROS  Review of Systems   Constitutional: Negative for chills, fatigue, fever and unexpected weight change.   HENT: Negative for ear pain, postnasal drip, rhinorrhea, sinus pressure and sore throat.    Eyes: Negative for photophobia and visual disturbance.   Respiratory: Positive for cough and shortness of breath. Negative for apnea, chest tightness and wheezing.    Cardiovascular: Positive for leg swelling. Negative for chest pain and palpitations.   Gastrointestinal: Negative for abdominal pain, blood in stool, constipation, diarrhea, nausea and vomiting.   Genitourinary: Negative for difficulty urinating.  "  Musculoskeletal: Negative for arthralgias and joint swelling.   Skin: Negative for rash.   Neurological: Negative for facial asymmetry, speech difficulty, weakness, numbness and headaches.   Psychiatric/Behavioral: Negative for dysphoric mood.       Physical Exam  Vitals:    03/20/19 1025   BP: 124/78   Pulse: 75   Resp: 18   Temp: 98.2 °F (36.8 °C)   TempSrc: Oral   SpO2: 96%   Weight: (!) 136.4 kg (300 lb 11.3 oz)   Height: 6' 5" (1.956 m)    Body mass index is 35.66 kg/m².  Weight: (!) 136.4 kg (300 lb 11.3 oz)   Height: 6' 5" (195.6 cm)     Physical Exam   Constitutional: He is oriented to person, place, and time. He appears well-developed and well-nourished.   HENT:   Head: Normocephalic and atraumatic.   Mouth/Throat: No oropharyngeal exudate.   Bilateral nasal turbinate swelling   Eyes: EOM are normal.   Cardiovascular: Normal rate, regular rhythm and normal heart sounds. Exam reveals no gallop and no friction rub.   No murmur heard.  R  LE edema 2+ > L LE edema    Pulmonary/Chest: Effort normal and breath sounds normal. No respiratory distress. He has no wheezes. He has no rales. He exhibits no tenderness.   Lymphadenopathy:     He has no cervical adenopathy.   Neurological: He is alert and oriented to person, place, and time.   Skin: Skin is warm and dry.   Psychiatric: He has a normal mood and affect. His behavior is normal.   Nursing note and vitals reviewed.      Health Maintenance       Date Due Completion Date    Zoster Vaccine 05/05/2015 ---    Lipid Panel 12/02/2022 12/2/2017    Colonoscopy 10/10/2026 10/10/2016 (Done)    Override on 10/10/2016: Done    TETANUS VACCINE 06/06/2028 6/6/2018            ASSESSMENT     1. Chronic combined systolic and diastolic congestive heart failure    2. Bronchitis    3. Essential hypertension    4. Nasal congestion        PLAN:     Problem List Items Addressed This Visit        Cardiac/Vascular    Essential hypertension (Chronic)    Current Assessment & Plan     " Continue current regimen of ramipril, coreg, lasix, aldactone         Chronic combined systolic and diastolic congestive heart failure - Primary    Current Assessment & Plan     Continue monitoring his weight at home, no rales today, LE edema at baseline           Other Visit Diagnoses     Bronchitis      -refill abx, s/p CAP treatment in hospital    Relevant Medications    cephALEXin (KEFLEX) 500 MG capsule    Nasal congestion      -begin flonase, he is also taking OTC chest congestion medicine    Relevant Medications    fluticasone (FLONASE) 50 mcg/actuation nasal spray            Brynn To MD  03/20/2019 10:49 AM        Follow-up in about 3 months (around 6/20/2019) for Follow up.

## 2019-03-22 NOTE — ADDENDUM NOTE
Addendum  created 03/22/19 1151 by Tasia Donovan MD    Intraprocedure Blocks edited, Sign clinical note

## 2019-05-01 ENCOUNTER — OFFICE VISIT (OUTPATIENT)
Dept: FAMILY MEDICINE | Facility: CLINIC | Age: 64
End: 2019-05-01
Payer: MEDICARE

## 2019-05-01 VITALS
TEMPERATURE: 98 F | HEIGHT: 77 IN | DIASTOLIC BLOOD PRESSURE: 80 MMHG | RESPIRATION RATE: 18 BRPM | WEIGHT: 297.94 LBS | BODY MASS INDEX: 35.18 KG/M2 | OXYGEN SATURATION: 95 % | SYSTOLIC BLOOD PRESSURE: 128 MMHG | HEART RATE: 72 BPM

## 2019-05-01 DIAGNOSIS — E66.01 SEVERE OBESITY (BMI 35.0-39.9) WITH COMORBIDITY: ICD-10-CM

## 2019-05-01 DIAGNOSIS — I10 ESSENTIAL HYPERTENSION: Chronic | ICD-10-CM

## 2019-05-01 DIAGNOSIS — I50.42 CHRONIC COMBINED SYSTOLIC AND DIASTOLIC CONGESTIVE HEART FAILURE: Primary | ICD-10-CM

## 2019-05-01 PROBLEM — G93.41 ENCEPHALOPATHY, METABOLIC: Status: RESOLVED | Noted: 2019-01-06 | Resolved: 2019-05-01

## 2019-05-01 PROCEDURE — 99999 PR PBB SHADOW E&M-EST. PATIENT-LVL III: ICD-10-PCS | Mod: PBBFAC,HCNC,, | Performed by: FAMILY MEDICINE

## 2019-05-01 PROCEDURE — 99214 PR OFFICE/OUTPT VISIT, EST, LEVL IV, 30-39 MIN: ICD-10-PCS | Mod: HCNC,S$GLB,, | Performed by: FAMILY MEDICINE

## 2019-05-01 PROCEDURE — 99214 OFFICE O/P EST MOD 30 MIN: CPT | Mod: HCNC,S$GLB,, | Performed by: FAMILY MEDICINE

## 2019-05-01 PROCEDURE — 3074F PR MOST RECENT SYSTOLIC BLOOD PRESSURE < 130 MM HG: ICD-10-PCS | Mod: HCNC,CPTII,S$GLB, | Performed by: FAMILY MEDICINE

## 2019-05-01 PROCEDURE — 3079F PR MOST RECENT DIASTOLIC BLOOD PRESSURE 80-89 MM HG: ICD-10-PCS | Mod: HCNC,CPTII,S$GLB, | Performed by: FAMILY MEDICINE

## 2019-05-01 PROCEDURE — 99999 PR PBB SHADOW E&M-EST. PATIENT-LVL III: CPT | Mod: PBBFAC,HCNC,, | Performed by: FAMILY MEDICINE

## 2019-05-01 PROCEDURE — 3079F DIAST BP 80-89 MM HG: CPT | Mod: HCNC,CPTII,S$GLB, | Performed by: FAMILY MEDICINE

## 2019-05-01 PROCEDURE — 3008F PR BODY MASS INDEX (BMI) DOCUMENTED: ICD-10-PCS | Mod: HCNC,CPTII,S$GLB, | Performed by: FAMILY MEDICINE

## 2019-05-01 PROCEDURE — 3074F SYST BP LT 130 MM HG: CPT | Mod: HCNC,CPTII,S$GLB, | Performed by: FAMILY MEDICINE

## 2019-05-01 PROCEDURE — 3008F BODY MASS INDEX DOCD: CPT | Mod: HCNC,CPTII,S$GLB, | Performed by: FAMILY MEDICINE

## 2019-05-01 RX ORDER — RAMIPRIL 2.5 MG/1
2.5 CAPSULE ORAL DAILY
Qty: 90 CAPSULE | Refills: 3 | Status: SHIPPED | OUTPATIENT
Start: 2019-05-01 | End: 2019-07-10 | Stop reason: SDUPTHER

## 2019-05-01 NOTE — PROGRESS NOTES
Chief Complaint   Patient presents with    Hypertension    Follow-up       HPI  Papito Bhakta is a 63 y.o. male with multiple medical diagnoses as listed in the medical history and problem list that presents for follow-up for HTN and CHF. We had been treating him for bronchitis w/ two rounds of kelfex since his hospital admission.    CHF- he reports his swelling is at baseline. He has no worsening of SOB. Discussed he has lost 3lbs    Bronchitis- improving, has some phlegm but not as often as before    PAST MEDICAL HISTORY:  Past Medical History:   Diagnosis Date    Anticoagulant long-term use     Aspirin    CHF (congestive heart failure)     Hyperlipidemia     Hypertension        PAST SURGICAL HISTORY:  Past Surgical History:   Procedure Laterality Date    Heart Cath-Bilateral Bilateral 2018    [facility]    INSERTION, ICD, BIVENTRICULAR N/A 2019    Per[facility] by [facility] at [facility] CATH LAB    oral extraction  2018    TESTICLE SURGERY         SOCIAL HISTORY:  Social History     Socioeconomic History    Marital status:      Spouse name: Not on file    Number of children: Not on file    Years of education: Not on file    Highest education level: Not on file   Occupational History    Not on file   Social Needs    Financial resource strain: Not on file    Food insecurity:     Worry: Not on file     Inability: Not on file    Transportation needs:     Medical: Not on file     Non-medical: Not on file   Tobacco Use    Smoking status: Former Smoker     Packs/day: 0.50     Years: 40.00     Pack years: 20.00     Types: Cigarettes, Cigars     Last attempt to quit:      Years since quittin.3    Smokeless tobacco: Never Used   Substance and Sexual Activity    Alcohol use: Yes     Comment: once a month    Drug use: No    Sexual activity: Yes     Birth control/protection: None     Comment: uses protection sometimes   Lifestyle     Physical activity:     Days per week: Not on file     Minutes per session: Not on file    Stress: Not on file   Relationships    Social connections:     Talks on phone: Not on file     Gets together: Not on file     Attends Christianity service: Not on file     Active member of club or organization: Not on file     Attends meetings of clubs or organizations: Not on file     Relationship status: Not on file   Other Topics Concern    Not on file   Social History Narrative    Not on file       FAMILY HISTORY:  Family History   Problem Relation Age of Onset    Epilepsy Mother        ALLERGIES AND MEDICATIONS: updated and reviewed.  Review of patient's allergies indicates:  No Known Allergies  Current Outpatient Medications   Medication Sig Dispense Refill    aspirin 325 MG tablet Take 325 mg by mouth once daily.      carvedilol (COREG) 3.125 MG tablet TAKE 1 TABLET TWICE DAILY 120 tablet 1    fluticasone (FLONASE) 50 mcg/actuation nasal spray 1 spray (50 mcg total) by Each Nare route once daily. 16 g 0    furosemide (LASIX) 80 MG tablet Take 1 tablet (80 mg total) by mouth once daily. 180 tablet 3    HYDROcodone-acetaminophen (NORCO) 5-325 mg per tablet Take 1 tablet by mouth every 4 (four) hours as needed (pain 5-10/10 pain scale). 30 tablet 0    pravastatin (PRAVACHOL) 80 MG tablet TAKE 1 TABLET ONE TIME DAILY 90 tablet 3    ramipril (ALTACE) 2.5 MG capsule Take 1 capsule (2.5 mg total) by mouth once daily. 90 capsule 3    spironolactone (ALDACTONE) 25 MG tablet TAKE 1 TABLET ONE TIME DAILY 90 tablet 3     No current facility-administered medications for this visit.        ROS  Review of Systems   Constitutional: Negative for chills, fatigue, fever and unexpected weight change.   HENT: Negative for ear pain, postnasal drip, rhinorrhea, sinus pressure and sore throat.    Eyes: Negative for photophobia and visual disturbance.   Respiratory: Negative for apnea, cough, chest tightness, shortness of breath and  "wheezing.    Cardiovascular: Negative for chest pain and palpitations.   Gastrointestinal: Negative for abdominal pain, blood in stool, constipation, diarrhea, nausea and vomiting.   Genitourinary: Negative for difficulty urinating.   Musculoskeletal: Negative for arthralgias and joint swelling.   Skin: Negative for rash.   Neurological: Negative for facial asymmetry, speech difficulty, weakness, numbness and headaches.   Psychiatric/Behavioral: Negative for dysphoric mood.       Physical Exam  Vitals:    05/01/19 1125   BP: 128/80   Pulse: 72   Resp: 18   Temp: 97.8 °F (36.6 °C)   TempSrc: Oral   SpO2: 95%   Weight: 135.2 kg (297 lb 15.2 oz)   Height: 6' 5" (1.956 m)    Body mass index is 35.33 kg/m².  Weight: 135.2 kg (297 lb 15.2 oz)   Height: 6' 5" (195.6 cm)     Physical Exam   Constitutional: He is oriented to person, place, and time. He appears well-developed and well-nourished.   HENT:   Head: Normocephalic and atraumatic.   Eyes: EOM are normal.   Cardiovascular: Normal rate, regular rhythm and normal heart sounds. Exam reveals no gallop and no friction rub.   No murmur heard.  Pulmonary/Chest: Effort normal and breath sounds normal. No respiratory distress. He has no wheezes. He has no rales. He exhibits no tenderness.   Neurological: He is alert and oriented to person, place, and time.   Skin: Skin is warm and dry.   Psychiatric: He has a normal mood and affect. His behavior is normal.   Nursing note and vitals reviewed.      Health Maintenance       Date Due Completion Date    Zoster Vaccine 05/05/2015 ---    Influenza Vaccine 08/01/2019 11/5/2018    Override on 11/1/2017: Done ([facility])    Lipid Panel 12/02/2022 12/2/2017    Colonoscopy 10/10/2026 10/10/2016 (Done)    Override on 10/10/2016: Done    TETANUS VACCINE 06/06/2028 6/6/2018          Health maintenance reviewed and addressed as ordered      ASSESSMENT     1. Chronic combined systolic and diastolic congestive heart failure    2. Essential " hypertension    3. BMI 40.0-44.9, adult    4. Severe obesity (BMI 35.0-39.9) with comorbidity        PLAN:     Problem List Items Addressed This Visit        Cardiac/Vascular    Chronic combined systolic and diastolic congestive heart failure - Primary  -continue current regimen, no signs of exacerbation    Relevant Medications    ramipril (ALTACE) 2.5 MG capsule    Essential hypertension (Chronic)  -controlled on current regimen       Endocrine    BMI 40.0-44.9, adult (Chronic)    Overview     -The patient is asked to make an attempt to improve diet and exercise patterns to aid in medical management of this problem.            Severe obesity (BMI 35.0-39.9) with comorbidity  --The patient is asked to make an attempt to improve diet and exercise patterns to aid in medical management of this problem.               Brynn To MD  05/01/2019 11:49 AM        Follow up in about 4 months (around 9/1/2019) for Follow up.

## 2019-05-14 ENCOUNTER — OFFICE VISIT (OUTPATIENT)
Dept: CARDIOLOGY | Facility: CLINIC | Age: 64
End: 2019-05-14
Payer: MEDICARE

## 2019-05-14 VITALS
SYSTOLIC BLOOD PRESSURE: 118 MMHG | HEART RATE: 120 BPM | WEIGHT: 297.19 LBS | OXYGEN SATURATION: 97 % | DIASTOLIC BLOOD PRESSURE: 66 MMHG | RESPIRATION RATE: 16 BRPM | BODY MASS INDEX: 35.24 KG/M2

## 2019-05-14 DIAGNOSIS — Z95.810 BIVENTRICULAR ICD (IMPLANTABLE CARDIOVERTER-DEFIBRILLATOR) IN PLACE: ICD-10-CM

## 2019-05-14 DIAGNOSIS — E78.49 OTHER HYPERLIPIDEMIA: ICD-10-CM

## 2019-05-14 DIAGNOSIS — I10 ESSENTIAL HYPERTENSION: ICD-10-CM

## 2019-05-14 DIAGNOSIS — I50.22 CHRONIC SYSTOLIC CONGESTIVE HEART FAILURE: Primary | ICD-10-CM

## 2019-05-14 PROCEDURE — 93289 INTERROG DEVICE EVAL HEART: CPT | Mod: 26,HCNC,, | Performed by: INTERNAL MEDICINE

## 2019-05-14 PROCEDURE — 3008F PR BODY MASS INDEX (BMI) DOCUMENTED: ICD-10-PCS | Mod: HCNC,CPTII,S$GLB, | Performed by: INTERNAL MEDICINE

## 2019-05-14 PROCEDURE — 3074F SYST BP LT 130 MM HG: CPT | Mod: HCNC,CPTII,S$GLB, | Performed by: INTERNAL MEDICINE

## 2019-05-14 PROCEDURE — 99999 PR PBB SHADOW E&M-EST. PATIENT-LVL III: ICD-10-PCS | Mod: PBBFAC,HCNC,, | Performed by: INTERNAL MEDICINE

## 2019-05-14 PROCEDURE — 99214 PR OFFICE/OUTPT VISIT, EST, LEVL IV, 30-39 MIN: ICD-10-PCS | Mod: HCNC,S$GLB,, | Performed by: INTERNAL MEDICINE

## 2019-05-14 PROCEDURE — 93289 PR INTERROG EVAL, IN PERSON,CARDVERT/DEFIB: ICD-10-PCS | Mod: 26,HCNC,, | Performed by: INTERNAL MEDICINE

## 2019-05-14 PROCEDURE — 99999 PR PBB SHADOW E&M-EST. PATIENT-LVL III: CPT | Mod: PBBFAC,HCNC,, | Performed by: INTERNAL MEDICINE

## 2019-05-14 PROCEDURE — 3078F PR MOST RECENT DIASTOLIC BLOOD PRESSURE < 80 MM HG: ICD-10-PCS | Mod: HCNC,CPTII,S$GLB, | Performed by: INTERNAL MEDICINE

## 2019-05-14 PROCEDURE — 3008F BODY MASS INDEX DOCD: CPT | Mod: HCNC,CPTII,S$GLB, | Performed by: INTERNAL MEDICINE

## 2019-05-14 PROCEDURE — 3078F DIAST BP <80 MM HG: CPT | Mod: HCNC,CPTII,S$GLB, | Performed by: INTERNAL MEDICINE

## 2019-05-14 PROCEDURE — 99214 OFFICE O/P EST MOD 30 MIN: CPT | Mod: HCNC,S$GLB,, | Performed by: INTERNAL MEDICINE

## 2019-05-14 PROCEDURE — 3074F PR MOST RECENT SYSTOLIC BLOOD PRESSURE < 130 MM HG: ICD-10-PCS | Mod: HCNC,CPTII,S$GLB, | Performed by: INTERNAL MEDICINE

## 2019-05-14 NOTE — PROGRESS NOTES
Subjective:    Patient ID:  Papito Bhakta is a 64 y.o. male who presents for follow-up of Follow-up (medtronic)      HPI    Previous history:  Here for follow-up of nonischemic cardiomyopathy.  He underwent successful Bi V ICD placement.  He feels a good difference with the device in place.  He is able to get up on treadmill in go where he previously could not.  His wound site looks good without any issues.  He denies any chest pain or palpitations.  He gets shortness of breath with heavy activity but relieved with rest.  He has chronic lower extremity edema.  This is actually improved a little bit with elevation post procedure.  He denies any dizziness, presyncope or syncope.    Today:  Here for follow-up of nonischemic cardiomyopathy and previous biventricular ICD placement.  He has felt much improved since device placement.  Has less swelling and shortness of breath.  He is able to go to the gym now without any issues.  He denies any chest pain or sustained tachycardia/palpitations.  He has experienced no PND, orthopnea or lower extremity edema.  He has experiencing dizziness, presyncope or syncope.      Review of Systems   Constitution: Negative.   HENT: Negative.    Eyes: Negative.    Cardiovascular: Positive for leg swelling. Negative for chest pain, dyspnea on exertion, irregular heartbeat, near-syncope, orthopnea, palpitations, paroxysmal nocturnal dyspnea and syncope.   Respiratory: Negative for shortness of breath.    Skin: Negative.    Musculoskeletal: Negative.    Gastrointestinal: Negative for abdominal pain, constipation and diarrhea.   Genitourinary: Negative for dysuria.   Neurological: Negative for dizziness.   Psychiatric/Behavioral: Negative.         Objective:    Physical Exam   Constitutional: He is oriented to person, place, and time. He appears well-developed and well-nourished. No distress.   HENT:   Head: Normocephalic and atraumatic.   Eyes: Pupils are equal, round, and reactive to light.  Conjunctivae and EOM are normal.   Neck: Normal range of motion. Neck supple. No thyromegaly present.   Cardiovascular: Normal rate, regular rhythm and normal heart sounds.   No murmur heard.  Pulmonary/Chest: Effort normal and breath sounds normal. No respiratory distress. He has no wheezes. He has no rales. He exhibits no tenderness.   Abdominal: Soft. Bowel sounds are normal.   Musculoskeletal: He exhibits edema.   Neurological: He is alert and oriented to person, place, and time.   Skin: Skin is warm and dry.   Psychiatric: He has a normal mood and affect. His behavior is normal.       ECHO:  1-19  · Limited study to assess function  · Severely decreased left ventricular systolic function. The estimated ejection fraction is 10%  · Left ventricular diastolic dysfunction.  · Severely reduced right ventricular systolic function.  · No thrombus     Right/left heart catheterization:  2-18  B. Summary/Post-Operative Diagnosis       Normal coronary arteries.    Systolic dysfunction.    Low right and left Filling Pressures.    Mild Pulmonary Hypertension.    Assessment:       1. Chronic systolic congestive heart failure    2. Essential hypertension    3. Other hyperlipidemia    4. BMI 40.0-44.9, adult    5. Biventricular ICD (implantable cardioverter-defibrillator) in place         Plan:       -Currently stable without any signs of central congestion, continue med therapy  -status post Bi V ICD   -Chronic lower extremity edema 2nd venous stasis is stable on current medicines, unable to tolerate compression stockings currently  -okay to continue maintenance Lasix as is  -Counseled on sodium restriction and diet/exercise  -Counseled on diet and exercise     Return to clinic in 6 months with device check      Medtronic biventricular ICD check:  Model:  Amplia    Mode DDD lower rate 60 beats per minute    A:  1.6 mV, 494 Ohms, 0.75 volts at 0.4 milliseconds  RV:  3.5 mV, 456 Ohms, 1 volt at 0.4 milliseconds  LV:  437 Ohms,  2 volts at 0.4 milliseconds    One nonsustained VT episode for 7 beats  No changes    Return to clinic in 6 months

## 2019-06-14 ENCOUNTER — TELEPHONE (OUTPATIENT)
Dept: FAMILY MEDICINE | Facility: CLINIC | Age: 64
End: 2019-06-14

## 2019-06-14 NOTE — TELEPHONE ENCOUNTER
----- Message from Radha Guzman sent at 6/14/2019 11:00 AM CDT -----  Contact: valeriy Valencia  [phone]  Type: Patient Call Back    Who called: valeriy Valencia    What is the request in detail: Calling to find out if paperwork for exercising/gym, is ready to be picked up. Annie states she dropped it off last week. Please call her in regards to this.    Would the patient rather a call back or a response via [facility]? Call back    Best call back number: [phone]

## 2019-07-10 DIAGNOSIS — I50.42 CHRONIC COMBINED SYSTOLIC AND DIASTOLIC CONGESTIVE HEART FAILURE: ICD-10-CM

## 2019-07-10 RX ORDER — RAMIPRIL 2.5 MG/1
2.5 CAPSULE ORAL DAILY
Qty: 90 CAPSULE | Refills: 3 | Status: SHIPPED | OUTPATIENT
Start: 2019-07-10 | End: 2019-09-03 | Stop reason: SDUPTHER

## 2019-07-10 RX ORDER — CARVEDILOL 3.12 MG/1
TABLET ORAL
Qty: 180 TABLET | Refills: 0 | Status: SHIPPED | OUTPATIENT
Start: 2019-07-10 | End: 2019-11-12

## 2019-07-10 NOTE — TELEPHONE ENCOUNTER
----- Message from Froilan Gooden sent at 7/10/2019 12:27 PM CDT -----  Contact: Pt's Father Annie Bhakta [phone]  Pt needs a refill on ramipril(Altace)2.5mg capsule( 90capsules) sent to [facility] [phone]  Take 1xday (90 day supply)  Provider: Yousuf  Pt can reached at [phone].    Thanks

## 2019-09-03 ENCOUNTER — OFFICE VISIT (OUTPATIENT)
Dept: FAMILY MEDICINE | Facility: CLINIC | Age: 64
End: 2019-09-03
Payer: MEDICARE

## 2019-09-03 VITALS
HEIGHT: 77 IN | OXYGEN SATURATION: 96 % | TEMPERATURE: 99 F | WEIGHT: 305.31 LBS | BODY MASS INDEX: 36.05 KG/M2 | HEART RATE: 63 BPM | SYSTOLIC BLOOD PRESSURE: 110 MMHG | DIASTOLIC BLOOD PRESSURE: 78 MMHG

## 2019-09-03 DIAGNOSIS — I50.42 CHRONIC COMBINED SYSTOLIC AND DIASTOLIC CONGESTIVE HEART FAILURE: Primary | ICD-10-CM

## 2019-09-03 DIAGNOSIS — I10 ESSENTIAL HYPERTENSION: Chronic | ICD-10-CM

## 2019-09-03 DIAGNOSIS — Z95.810 BIVENTRICULAR ICD (IMPLANTABLE CARDIOVERTER-DEFIBRILLATOR) IN PLACE: ICD-10-CM

## 2019-09-03 DIAGNOSIS — S46.002A INJURY OF LEFT ROTATOR CUFF, INITIAL ENCOUNTER: ICD-10-CM

## 2019-09-03 PROCEDURE — 99999 PR PBB SHADOW E&M-EST. PATIENT-LVL IV: ICD-10-PCS | Mod: PBBFAC,HCNC,, | Performed by: FAMILY MEDICINE

## 2019-09-03 PROCEDURE — 3078F PR MOST RECENT DIASTOLIC BLOOD PRESSURE < 80 MM HG: ICD-10-PCS | Mod: HCNC,CPTII,S$GLB, | Performed by: FAMILY MEDICINE

## 2019-09-03 PROCEDURE — 3074F SYST BP LT 130 MM HG: CPT | Mod: HCNC,CPTII,S$GLB, | Performed by: FAMILY MEDICINE

## 2019-09-03 PROCEDURE — 3008F PR BODY MASS INDEX (BMI) DOCUMENTED: ICD-10-PCS | Mod: HCNC,CPTII,S$GLB, | Performed by: FAMILY MEDICINE

## 2019-09-03 PROCEDURE — 3078F DIAST BP <80 MM HG: CPT | Mod: HCNC,CPTII,S$GLB, | Performed by: FAMILY MEDICINE

## 2019-09-03 PROCEDURE — 99999 PR PBB SHADOW E&M-EST. PATIENT-LVL IV: CPT | Mod: PBBFAC,HCNC,, | Performed by: FAMILY MEDICINE

## 2019-09-03 PROCEDURE — 99499 UNLISTED E&M SERVICE: CPT | Mod: HCNC,S$GLB,, | Performed by: FAMILY MEDICINE

## 2019-09-03 PROCEDURE — 99214 PR OFFICE/OUTPT VISIT, EST, LEVL IV, 30-39 MIN: ICD-10-PCS | Mod: HCNC,S$GLB,, | Performed by: FAMILY MEDICINE

## 2019-09-03 PROCEDURE — 3074F PR MOST RECENT SYSTOLIC BLOOD PRESSURE < 130 MM HG: ICD-10-PCS | Mod: HCNC,CPTII,S$GLB, | Performed by: FAMILY MEDICINE

## 2019-09-03 PROCEDURE — 99499 RISK ADDL DX/OHS AUDIT: ICD-10-PCS | Mod: HCNC,S$GLB,, | Performed by: FAMILY MEDICINE

## 2019-09-03 PROCEDURE — 3008F BODY MASS INDEX DOCD: CPT | Mod: HCNC,CPTII,S$GLB, | Performed by: FAMILY MEDICINE

## 2019-09-03 PROCEDURE — 99214 OFFICE O/P EST MOD 30 MIN: CPT | Mod: HCNC,S$GLB,, | Performed by: FAMILY MEDICINE

## 2019-09-03 RX ORDER — DICLOFENAC SODIUM 10 MG/G
2 GEL TOPICAL 4 TIMES DAILY
Qty: 100 G | Refills: 1 | Status: ON HOLD | OUTPATIENT
Start: 2019-09-03 | End: 2020-09-30

## 2019-09-03 RX ORDER — RAMIPRIL 2.5 MG/1
2.5 CAPSULE ORAL DAILY
Qty: 90 CAPSULE | Refills: 3 | Status: SHIPPED | OUTPATIENT
Start: 2019-09-03 | End: 2019-11-12

## 2019-09-03 NOTE — PROGRESS NOTES
Chief Complaint   Patient presents with    Hypertension     F/U       HPI  Papito Bhakta is a 64 y.o. male with multiple medical diagnoses as listed in the medical history and problem list that presents for follow-up for HTN and CHF, and left arm    CHF- he has not had and KING    HTN- BP has been controlled    Left shoulder pain- has been present since he had his pacemaker put in 2019. He has been having pain with lifting his arm. Worse with cutting grass, which he stopped doing due to pain with ROM    PAST MEDICAL HISTORY:  Past Medical History:   Diagnosis Date    Anticoagulant long-term use     Aspirin    CHF (congestive heart failure)     Hyperlipidemia     Hypertension        PAST SURGICAL HISTORY:  Past Surgical History:   Procedure Laterality Date    Heart Cath-Bilateral Bilateral 2018    Performed by [facility] MD at [facility] CATH LAB    INSERTION, ICD, BIVENTRICULAR N/A 2019    Performed by [facility]ailesh Eng MD at [facility] CATH LAB    oral extraction  2018    TESTICLE SURGERY         SOCIAL HISTORY:  Social History     Socioeconomic History    Marital status:      Spouse name: Not on file    Number of children: Not on file    Years of education: Not on file    Highest education level: Not on file   Occupational History    Not on file   Social Needs    Financial resource strain: Not on file    Food insecurity:     Worry: Not on file     Inability: Not on file    Transportation needs:     Medical: Not on file     Non-medical: Not on file   Tobacco Use    Smoking status: Former Smoker     Packs/day: 0.50     Years: 40.00     Pack years: 20.00     Types: Cigarettes, Cigars     Last attempt to quit:      Years since quittin.6    Smokeless tobacco: Never Used   Substance and Sexual Activity    Alcohol use: Yes     Comment: once a month    Drug use: No    Sexual activity: Yes     Birth control/protection: None     Comment: uses protection sometimes    Lifestyle    Physical activity:     Days per week: Not on file     Minutes per session: Not on file    Stress: Not on file   Relationships    Social connections:     Talks on phone: Not on file     Gets together: Not on file     Attends Restorationism service: Not on file     Active member of club or organization: Not on file     Attends meetings of clubs or organizations: Not on file     Relationship status: Not on file   Other Topics Concern    Not on file   Social History Narrative    Not on file       FAMILY HISTORY:  Family History   Problem Relation Age of Onset    Epilepsy Mother        ALLERGIES AND MEDICATIONS: updated and reviewed.  Review of patient's allergies indicates:  No Known Allergies  Current Outpatient Medications   Medication Sig Dispense Refill    aspirin 325 MG tablet Take 325 mg by mouth once daily.      carvedilol (COREG) 3.125 MG tablet TAKE 1 TABLET TWICE DAILY 180 tablet 0    pravastatin (PRAVACHOL) 80 MG tablet TAKE 1 TABLET ONE TIME DAILY 90 tablet 3    spironolactone (ALDACTONE) 25 MG tablet TAKE 1 TABLET ONE TIME DAILY 90 tablet 3    diclofenac sodium (VOLTAREN) 1 % Gel Apply 2 g topically 4 (four) times daily. 100 g 1    furosemide (LASIX) 80 MG tablet Take 1 tablet (80 mg total) by mouth once daily. 180 tablet 3    ramipril (ALTACE) 2.5 MG capsule Take 1 capsule (2.5 mg total) by mouth once daily. 90 capsule 3     No current facility-administered medications for this visit.        ROS  Review of Systems   Constitutional: Negative for chills, fatigue, fever and unexpected weight change.   HENT: Negative for ear pain, postnasal drip, rhinorrhea, sinus pressure and sore throat.    Eyes: Negative for photophobia and visual disturbance.   Respiratory: Negative for apnea, cough, chest tightness, shortness of breath and wheezing.    Cardiovascular: Negative for chest pain and palpitations.   Gastrointestinal: Negative for abdominal pain, blood in stool, constipation, diarrhea,  "nausea and vomiting.   Genitourinary: Negative for difficulty urinating.   Musculoskeletal: Positive for arthralgias. Negative for joint swelling.   Skin: Negative for rash.   Neurological: Negative for facial asymmetry, speech difficulty, weakness, numbness and headaches.   Psychiatric/Behavioral: Negative for dysphoric mood.       Physical Exam  Vitals:    09/03/19 1053   BP: 110/78   BP Location: Right arm   Patient Position: Sitting   BP Method: Large (Manual)   Pulse: 63   Temp: 98.5 °F (36.9 °C)   TempSrc: Oral   SpO2: 96%   Weight: (!) 138.5 kg (305 lb 5.4 oz)   Height: 6' 5" (1.956 m)    Body mass index is 36.21 kg/m².  Weight: (!) 138.5 kg (305 lb 5.4 oz)   Height: 6' 5" (195.6 cm)     Physical Exam   Constitutional: He is oriented to person, place, and time. He appears well-developed and well-nourished.   HENT:   Head: Normocephalic and atraumatic.   Eyes: EOM are normal.   Cardiovascular: Normal rate, regular rhythm and normal heart sounds. Exam reveals no gallop and no friction rub.   No murmur heard.  Pulmonary/Chest: Effort normal and breath sounds normal. No respiratory distress. He has no wheezes. He has no rales. He exhibits no tenderness.   Musculoskeletal:   Left shoulder- TTP over acromion, pain with abduction past 90 degrees, TTP with lujan test, negative empty can, negative apley test   Lymphadenopathy:     He has no cervical adenopathy.   Neurological: He is alert and oriented to person, place, and time.   Reflex Scores:       Bicep reflexes are 2+ on the right side and 2+ on the left side.       Brachioradialis reflexes are 2+ on the right side and 2+ on the left side.  Skin: Skin is warm and dry.   Psychiatric: He has a normal mood and affect. His behavior is normal.   Nursing note and vitals reviewed.      Health Maintenance       Date Due Completion Date    Shingles Vaccine (1 of 2) 05/05/2005 ---    Influenza Vaccine (1) 09/01/2019 11/5/2018    Lipid Panel 12/02/2022 12/2/2017    " Colonoscopy 10/10/2026 10/10/2016 (Done)    Override on 10/10/2016: Done    TETANUS VACCINE 06/06/2028 6/6/2018          Health maintenance reviewed and addressed as ordered      ASSESSMENT     1. Chronic combined systolic and diastolic congestive heart failure    2. Biventricular ICD (implantable cardioverter-defibrillator) in place    3. Essential hypertension    4. Injury of left rotator cuff, initial encounter        PLAN:     Problem List Items Addressed This Visit        Cardiac/Vascular    Biventricular ICD (implantable cardioverter-defibrillator) in place  -s/p pacemaker implant in February    Chronic combined systolic and diastolic congestive heart failure - Primary  -refill altace, Bp on the lower end but he is asymptomatic, on lasix current dose    Relevant Medications    ramipril (ALTACE) 2.5 MG capsule    Essential hypertension (Chronic)  -controlled on current regimen      Other Visit Diagnoses     Injury of left rotator cuff, initial encounter      -home exercise program given for 6 wks  -consult PT if no improvement  -topical voltaren applied    Relevant Medications    diclofenac sodium (VOLTAREN) 1 % Gel            Brynn To MD  09/03/2019 11:01 AM        Follow up in about 6 weeks (around 10/15/2019) for Follow up.

## 2019-10-16 ENCOUNTER — OFFICE VISIT (OUTPATIENT)
Dept: FAMILY MEDICINE | Facility: CLINIC | Age: 64
End: 2019-10-16
Payer: MEDICARE

## 2019-10-16 VITALS
TEMPERATURE: 99 F | HEIGHT: 77 IN | SYSTOLIC BLOOD PRESSURE: 104 MMHG | DIASTOLIC BLOOD PRESSURE: 70 MMHG | WEIGHT: 314.81 LBS | HEART RATE: 60 BPM | OXYGEN SATURATION: 97 % | BODY MASS INDEX: 37.17 KG/M2

## 2019-10-16 DIAGNOSIS — I50.42 CHRONIC COMBINED SYSTOLIC AND DIASTOLIC CONGESTIVE HEART FAILURE: ICD-10-CM

## 2019-10-16 DIAGNOSIS — Z23 FLU VACCINE NEED: ICD-10-CM

## 2019-10-16 DIAGNOSIS — S46.012A ROTATOR CUFF STRAIN, LEFT, INITIAL ENCOUNTER: Primary | ICD-10-CM

## 2019-10-16 PROCEDURE — 3008F BODY MASS INDEX DOCD: CPT | Mod: HCNC,CPTII,S$GLB, | Performed by: FAMILY MEDICINE

## 2019-10-16 PROCEDURE — G0008 FLU VACCINE (QUAD) GREATER THAN OR EQUAL TO 3YO PRESERVATIVE FREE IM: ICD-10-PCS | Mod: HCNC,S$GLB,, | Performed by: FAMILY MEDICINE

## 2019-10-16 PROCEDURE — 99214 PR OFFICE/OUTPT VISIT, EST, LEVL IV, 30-39 MIN: ICD-10-PCS | Mod: 25,HCNC,S$GLB, | Performed by: FAMILY MEDICINE

## 2019-10-16 PROCEDURE — 90686 IIV4 VACC NO PRSV 0.5 ML IM: CPT | Mod: HCNC,S$GLB,, | Performed by: FAMILY MEDICINE

## 2019-10-16 PROCEDURE — 3074F PR MOST RECENT SYSTOLIC BLOOD PRESSURE < 130 MM HG: ICD-10-PCS | Mod: HCNC,CPTII,S$GLB, | Performed by: FAMILY MEDICINE

## 2019-10-16 PROCEDURE — 99214 OFFICE O/P EST MOD 30 MIN: CPT | Mod: 25,HCNC,S$GLB, | Performed by: FAMILY MEDICINE

## 2019-10-16 PROCEDURE — 3074F SYST BP LT 130 MM HG: CPT | Mod: HCNC,CPTII,S$GLB, | Performed by: FAMILY MEDICINE

## 2019-10-16 PROCEDURE — 99999 PR PBB SHADOW E&M-EST. PATIENT-LVL IV: CPT | Mod: PBBFAC,HCNC,, | Performed by: FAMILY MEDICINE

## 2019-10-16 PROCEDURE — 3078F PR MOST RECENT DIASTOLIC BLOOD PRESSURE < 80 MM HG: ICD-10-PCS | Mod: HCNC,CPTII,S$GLB, | Performed by: FAMILY MEDICINE

## 2019-10-16 PROCEDURE — 90686 FLU VACCINE (QUAD) GREATER THAN OR EQUAL TO 3YO PRESERVATIVE FREE IM: ICD-10-PCS | Mod: HCNC,S$GLB,, | Performed by: FAMILY MEDICINE

## 2019-10-16 PROCEDURE — 3078F DIAST BP <80 MM HG: CPT | Mod: HCNC,CPTII,S$GLB, | Performed by: FAMILY MEDICINE

## 2019-10-16 PROCEDURE — 99999 PR PBB SHADOW E&M-EST. PATIENT-LVL IV: ICD-10-PCS | Mod: PBBFAC,HCNC,, | Performed by: FAMILY MEDICINE

## 2019-10-16 PROCEDURE — 3008F PR BODY MASS INDEX (BMI) DOCUMENTED: ICD-10-PCS | Mod: HCNC,CPTII,S$GLB, | Performed by: FAMILY MEDICINE

## 2019-10-16 PROCEDURE — G0008 ADMIN INFLUENZA VIRUS VAC: HCPCS | Mod: HCNC,S$GLB,, | Performed by: FAMILY MEDICINE

## 2019-10-16 NOTE — PROGRESS NOTES
Chief Complaint   Patient presents with    Shoulder Pain     left side       HPI  Papito Bhakta is a 64 y.o. male with multiple medical diagnoses as listed in the medical history and problem list that presents for follow-up for left shoulder pain    Left shoulder pain- has been doing exercises and using topical therapy with little relief. Has some good days in which he is able to cut his grass and use his left hand but has trouble lifting his arm over his head    CHF- he has had some weight gain and has eaten some boiled shrimp a week ago    PAST MEDICAL HISTORY:  Past Medical History:   Diagnosis Date    Anticoagulant long-term use     Aspirin    CHF (congestive heart failure)     Hyperlipidemia     Hypertension        PAST SURGICAL HISTORY:  Past Surgical History:   Procedure Laterality Date    INSERTION OF BIVENTRICULAR IMPLANTABLE CARDIOVERTER-DEFIBRILLATOR (ICD) N/A 2019    Procedure: INSERTION, ICD, BIVENTRICULAR;  Surgeon: Shailesh Eng MD;  Location: [facility] CATH LAB;  Service: Cardiology;  Laterality: N/A;  RN PRE OP 19  1ST CASE PER  RADHA. NOTIFIED RADHA THAT ANESTHESIA IS NOT PITO FOR 1ST CASE START-LO    oral extraction  2018    TESTICLE SURGERY         SOCIAL HISTORY:  Social History     Socioeconomic History    Marital status:      Spouse name: Not on file    Number of children: Not on file    Years of education: Not on file    Highest education level: Not on file   Occupational History    Not on file   Social Needs    Financial resource strain: Not on file    Food insecurity:     Worry: Not on file     Inability: Not on file    Transportation needs:     Medical: Not on file     Non-medical: Not on file   Tobacco Use    Smoking status: Former Smoker     Packs/day: 0.50     Years: 40.00     Pack years: 20.00     Types: Cigarettes, Cigars     Last attempt to quit:      Years since quittin.7    Smokeless tobacco: Never Used   Substance and Sexual Activity     Alcohol use: Yes     Comment: once a month    Drug use: No    Sexual activity: Yes     Birth control/protection: None     Comment: uses protection sometimes   Lifestyle    Physical activity:     Days per week: Not on file     Minutes per session: Not on file    Stress: Not on file   Relationships    Social connections:     Talks on phone: Not on file     Gets together: Not on file     Attends Islam service: Not on file     Active member of club or organization: Not on file     Attends meetings of clubs or organizations: Not on file     Relationship status: Not on file   Other Topics Concern    Not on file   Social History Narrative    Not on file       FAMILY HISTORY:  Family History   Problem Relation Age of Onset    Epilepsy Mother        ALLERGIES AND MEDICATIONS: updated and reviewed.  Review of patient's allergies indicates:  No Known Allergies  Current Outpatient Medications   Medication Sig Dispense Refill    aspirin 325 MG tablet Take 325 mg by mouth once daily.      carvedilol (COREG) 3.125 MG tablet TAKE 1 TABLET TWICE DAILY 180 tablet 0    diclofenac sodium (VOLTAREN) 1 % Gel Apply 2 g topically 4 (four) times daily. 100 g 1    furosemide (LASIX) 80 MG tablet Take 1 tablet (80 mg total) by mouth once daily. 180 tablet 3    pravastatin (PRAVACHOL) 80 MG tablet TAKE 1 TABLET ONE TIME DAILY 90 tablet 3    ramipril (ALTACE) 2.5 MG capsule Take 1 capsule (2.5 mg total) by mouth once daily. 90 capsule 3    spironolactone (ALDACTONE) 25 MG tablet TAKE 1 TABLET ONE TIME DAILY 90 tablet 3     No current facility-administered medications for this visit.        ROS  Review of Systems   Constitutional: Negative for chills, fatigue, fever and unexpected weight change.   HENT: Negative for ear pain, postnasal drip, rhinorrhea, sinus pressure and sore throat.    Eyes: Negative for photophobia and visual disturbance.   Respiratory: Negative for apnea, cough, chest tightness, shortness of breath and  "wheezing.    Cardiovascular: Negative for chest pain and palpitations.   Gastrointestinal: Negative for abdominal pain, blood in stool, constipation, diarrhea, nausea and vomiting.   Genitourinary: Negative for difficulty urinating.   Musculoskeletal: Positive for arthralgias. Negative for joint swelling.   Skin: Negative for rash.   Neurological: Negative for facial asymmetry, speech difficulty, weakness, numbness and headaches.   Psychiatric/Behavioral: Negative for dysphoric mood.       Physical Exam  Vitals:    10/16/19 1044   BP: 104/70   Pulse: 60   Temp: 98.5 °F (36.9 °C)   TempSrc: Oral   SpO2: 97%   Weight: (!) 142.8 kg (314 lb 13.1 oz)   Height: 6' 5" (1.956 m)    Body mass index is 37.33 kg/m².  Weight: (!) 142.8 kg (314 lb 13.1 oz)   Height: 6' 5" (195.6 cm)     Physical Exam   Constitutional: He is oriented to person, place, and time. He appears well-developed and well-nourished.   HENT:   Head: Normocephalic and atraumatic.   Eyes: EOM are normal.   Neurological: He is alert and oriented to person, place, and time.   Skin: Skin is warm and dry. No rash noted.   Psychiatric: He has a normal mood and affect. His behavior is normal.   Nursing note and vitals reviewed.      Health Maintenance       Date Due Completion Date    Shingles Vaccine (1 of 2) 05/05/2005 ---    Lipid Panel 12/02/2022 12/2/2017    Colonoscopy 10/10/2026 10/10/2016 (Done)    Override on 10/10/2016: Done    TETANUS VACCINE 06/06/2028 6/6/2018          Health maintenance reviewed and addressed as ordered      ASSESSMENT     1. Rotator cuff strain, left, initial encounter    2. Flu vaccine need    3. Chronic combined systolic and diastolic congestive heart failure        PLAN:     Problem List Items Addressed This Visit        Cardiac/Vascular    Chronic combined systolic and diastolic congestive heart failure  -increase lasix 80mg BID for 3 days  -discussed avoid high Na foods    Relevant Orders    Ambulatory consult to Physical " Therapy      Other Visit Diagnoses     Rotator cuff strain, left, initial encounter    -  Primary  -will start PT to improve symptoms  -consider ortho if no improvement    Relevant Orders    Ambulatory consult to Physical Therapy    Flu vaccine need     -as ordered        Relevant Orders    Influenza - Quadrivalent (6 months+) (PF) (Completed)            Brynn To MD  10/16/2019 11:08 AM        Follow up in about 2 months (around 12/16/2019) for Follow up.

## 2019-10-24 ENCOUNTER — CLINICAL SUPPORT (OUTPATIENT)
Dept: REHABILITATION | Facility: HOSPITAL | Age: 64
End: 2019-10-24
Attending: FAMILY MEDICINE
Payer: MEDICARE

## 2019-10-24 DIAGNOSIS — E78.49 OTHER HYPERLIPIDEMIA: Primary | ICD-10-CM

## 2019-10-24 DIAGNOSIS — M25.512 CHRONIC LEFT SHOULDER PAIN: ICD-10-CM

## 2019-10-24 DIAGNOSIS — G89.29 CHRONIC LEFT SHOULDER PAIN: ICD-10-CM

## 2019-10-24 DIAGNOSIS — R29.898 DECREASED STRENGTH OF UPPER EXTREMITY: Primary | ICD-10-CM

## 2019-10-24 DIAGNOSIS — M25.612 DECREASED RANGE OF MOTION OF LEFT SHOULDER: ICD-10-CM

## 2019-10-24 PROCEDURE — 97162 PT EVAL MOD COMPLEX 30 MIN: CPT | Mod: HCNC,PN

## 2019-10-24 PROCEDURE — 97110 THERAPEUTIC EXERCISES: CPT | Mod: HCNC,PN

## 2019-10-24 RX ORDER — SPIRONOLACTONE 25 MG/1
25 TABLET ORAL DAILY
Qty: 90 TABLET | Refills: 3 | Status: ON HOLD | OUTPATIENT
Start: 2019-10-24 | End: 2020-10-11 | Stop reason: SDUPTHER

## 2019-10-24 RX ORDER — PRAVASTATIN SODIUM 80 MG/1
80 TABLET ORAL DAILY
Qty: 90 TABLET | Refills: 3 | Status: ON HOLD | OUTPATIENT
Start: 2019-10-24 | End: 2020-10-11 | Stop reason: SDUPTHER

## 2019-10-24 NOTE — PROGRESS NOTES
Please See Full Physical Therapy Evaluation in Plan of Care                                                        Physical Therapy Initial Evaluation     Name: Papito Bhakta  Clinic Number: [id number]    Therapy Diagnosis:   Encounter Diagnoses   Name Primary?    Chronic left shoulder pain     Decreased range of motion of left shoulder     Decreased strength of upper extremity Yes     Physician: Brynn To MD    Physician Orders: PT Eval and Treat   Medical Diagnosis from Referral:   S46.012A (ICD-10-CM) - Rotator cuff strain, left, initial encounter   I50.42 (ICD-10-CM) - Chronic combined systolic and diastolic congestive heart failure     Evaluation Date: 10/24/2019  Authorization Period Expiration: 10/15/2020  Plan of Care Expiration: 1/24/19  Visit # / Visits authorized: 1/ 1    Time In: 1:00pm  Time Out: 2:00pm  Total Billable Time: 60 minutes    Precautions: Standard, CHF and pacemaker    Functional Limitations Reports - G Codes  Category: Mobility  Tool: FOTO Shoulder Survey  Score: 41% Limitation    TEST SCORE  Modifier  Impairment Limitation Restriction    0  CH  0 % impaired, limited or restricted   1-23  CI  @ least 1% but less than 20% impaired, limited or restricted   24-47  CJ  @ least 20%<40% impaired, limited or restricted   48-71  CK  @ least 40%<60% impaired, limited or restricted   72-95  CL  @ least 60% <80% impaired, limited or restricted     CM  @ least 80%<100% impaired limited or restricted   120  CN  100% impaired, limited or restricted     Current ():  CK = 40-60% Limitation  Goal (): CJ = 20-40% Limitation    TREATMENT     Treatment Time In: 1:50pm  Treatment Time Out: 2:00pm  Total Treatment time separate from Evaluation: 10 minutes    Papito received therapeutic exercises to develop strength, endurance, ROM, flexibility, posture and core stabilization for 10 minutes including:    SL ER 15x  Prone Row 15x  Table Slides 15x  -add next session scap retraction, rows,  pulleys    Home Exercises and Patient Education Provided    Education provided:     Written Home Exercises Provided: yes.  Exercises were reviewed and Papito was able to demonstrate them prior to the end of the session.  Papito demonstrated good  understanding of the education provided.     See EMR under Patient Instructions for exercises provided 10/24/2019.      Assessment     Pt's spiritual, cultural and educational needs considered and pt agreeable to plan of care and goals as stated below:     Short Term GOALS: 4 weeks. Pt agrees with goals set.  1. Patient demonstrates independence with HEP.   2. Patient demonstrates independence with Postural Awareness.   3. Patient demonstrates independence with body mechanics.   4. Patient will report pain of 4/10 at worst, on 0-10 pain scale, with all activity  5. Pt will increase GHJ ROM by 10 degrees in elevation to improve functional reach pain free.   6. Pt will increase UE strength by 1/3 muscle grade or greater on MMT to improve tolerance to all functional activities pain free.     Long Term Goals 8 Weeks. Pt agrees with goals set:  1. Pt will increase GHJ ROM to 115 degrees actively elevation or greater improve functional reach pain free.   2. Pt will increase UE strength to 4/5 to improve tolerance to all functional activities pain free.   3. Pt demonstrates improved function per FOTO Shoulder Survey to 30% Limitation or less.   4. Patient will report pain of 2/10 at worst, on 0-10 pain scale, with all activity  5. Patient demonstrates ability to lift object overhead, proper movement pattern, no pain    Plan     Plan of care Certification: 10/24/2019 to 1/24/20.    Outpatient Physical Therapy 2 times weekly for 8 weeks to include the following interventions: Cervical/Lumbar Traction, Electrical Stimulation IFC/NMES, Iontophoresis (with Dexamethasone), Manual Therapy, Moist Heat/ Ice, Neuromuscular Re-ed, Patient Education, Self Care, Therapeutic Activites,  Therapeutic Exercise, Ultrasound and Dry Needling. Pt may be seen by PTA as part of the rehabilitation team.    Piero Baker, PT  10/24/2019    I have seen the patient, reviewed the therapist's plan of care, and I agree with the plan of care.      I certify the need for these services furnished under this plan of treatment and while under my care.     ___________________ ________ Physician/Referring Practitioner            ___________________________ Date of Signature

## 2019-10-24 NOTE — PLAN OF CARE
Physical Therapy Initial Evaluation     Name: Papito Bhakta  Clinic Number: [id number]    Therapy Diagnosis:   Encounter Diagnoses   Name Primary?    Chronic left shoulder pain     Decreased range of motion of left shoulder     Decreased strength of upper extremity Yes     Physician: Brynn To MD    Physician Orders: PT Eval and Treat   Medical Diagnosis from Referral:   S46.012A (ICD-10-CM) - Rotator cuff strain, left, initial encounter   I50.42 (ICD-10-CM) - Chronic combined systolic and diastolic congestive heart failure     Evaluation Date: 10/24/2019  Authorization Period Expiration: 10/15/2020  Plan of Care Expiration: 1/24/19  Visit # / Visits authorized: 1/ 1    Time In: 1:00pm  Time Out: 2:00pm  Total Billable Time: 60 minutes    Precautions: Standard, CHF and pacemaker    Subjective     Medical History:   Past Medical History:   Diagnosis Date    Anticoagulant long-term use     Aspirin    CHF (congestive heart failure)     Hyperlipidemia     Hypertension      Surgical History:   Papito Bhakta  has a past surgical history that includes Testicle surgery; oral extraction (11/2018); and Insertion of biventricular implantable cardioverter-defibrillator (ICD) (N/A, 2/13/2019).    Medications:   Papito has a current medication list which includes the following prescription(s): aspirin, carvedilol, diclofenac sodium, furosemide, pravastatin, ramipril, and spironolactone.    Allergies:   Review of patient's allergies indicates:  No Known Allergies     Date of onset: ~February 2019 (8 months ago)  History of current condition - Papito reports: L shoulder began earlier in the year (~8 months ago) due to medical co-morbidities. Had pneumonia and was in ICU in January 2019. Pacemaker was implanted in February 2019, resulting in chronic, prolonged use of L UE sling. After using sling the pain began to worsen.  Pt is L-handed.Denies pain prior to pacemaker  placement. Pain primarily in anterior/superior LGHJ. Pain worseness with shoulder flexion/elbow extension and over-head elevation. Increased pain with yard-work involving elevation. No pain while carrying/lifting objects by his side, but if he elevates arm it will hurt.   No previous surgeries to L UE.    Imaging - none on file for L shoulder    Prior Therapy: None  Social History: lives with daughter and grand-son  Occupation: Retired/Disabled - due to health issues - previously   Prior Level of Function: Indep  DME owned/used: none  Current Level of Function: Mod indep    Pain:  Current 2/10, worst 8/10, best 1/10   Location: left shoulder   Description: sharp, stabbing  Aggravating Factors: lifting arm, reaching up, L side-lying  Easing Factors: rest, not lifting arm    Pts goals: Pain-free, improve strength and mobility of L UE, improve performance of yard work    Objective     Posture: slouched sitting posture, rounded shoulders, forward head, sacral sitting  Dermatomes: intact  Myotomes: L UE grossly weak in all planes of GHJ motion  DTRs: intact  Palpation: no specific tenderness to palpation    Cervical Screen:   WNL - pain-free    Shoulder Range of Motion:   ACTIVE ROM LEFT RIGHT   Flexion 90 160   Abduction 80 160   Functional IR T12 T7   Functional ER Upper trap/C7 T3     PASSIVE ROM LEFT RIGHT   Flexion 140 170   Abduction 115 170   IR/90deg 80 90   ER/90deg 55 90     STRENGTH LEFT RIGHT   Flexion 3-/5 4/5   Abduction 3-/5 4/5   IR (neutral) 3+/5 4/5   ER (neutral) 3+/5 4/5   Middle Trap 3-/5 3-/5   Lower Trap 3-/5 3-/5     Joint Mobility: L GHJ hypomobility     Scapular Control/Dyskinesis:    Normal / Subtle / Obvious   Left Obvious shoulder hike with elevation, contralateral trunk lean   Right Subtle Shoulder hike with elevation     Special Tests:    Left Right   Empty Can (Supraspinatus) Positive Negative   Mila (Supraspinatus) Positive Negative   Neer Impingement Positive Negative   Lift  Off (Subscap) Negative Negative   Belly Press (Subscap) Negative Negative   Drop Arm  Positive Negative   Anterior Apprehension Inconsistent - occasional apprehension Negative     Functional Limitations Reports - G Codes  Category: Mobility  Tool: FOTO Shoulder Survey  Score: 41% Limitation    TEST SCORE  Modifier  Impairment Limitation Restriction    0  CH  0 % impaired, limited or restricted   1-23  CI  @ least 1% but less than 20% impaired, limited or restricted   24-47  CJ  @ least 20%<40% impaired, limited or restricted   48-71  CK  @ least 40%<60% impaired, limited or restricted   72-95  CL  @ least 60% <80% impaired, limited or restricted     CM  @ least 80%<100% impaired limited or restricted   120  CN  100% impaired, limited or restricted     Current ():  CK = 40-60% Limitation  Goal (): CJ = 20-40% Limitation    TREATMENT     Treatment Time In: 1:50pm  Treatment Time Out: 2:00pm  Total Treatment time separate from Evaluation: 10 minutes    Papito received therapeutic exercises to develop strength, endurance, ROM, flexibility, posture and core stabilization for 10 minutes including:    SL ER 15x  Prone Row 15x  Table Slides 15x  -add next session scap retraction, rows, pulleys    Home Exercises and Patient Education Provided    Education provided:     Written Home Exercises Provided: yes.  Exercises were reviewed and Papito was able to demonstrate them prior to the end of the session.  Papito demonstrated good  understanding of the education provided.     See EMR under Patient Instructions for exercises provided 10/24/2019.      Assessment     Papito is a 64 y.o. male referred to outpatient Physical Therapy with a medical diagnosis of Rotator cuff strain. with signs and symptoms including:: increased shoulder pain, decreased UE ROM, decreased UE Strength, soft tissue dysfunction, postural imbalance,impaired joint mobility, and decreased tolerance to functional activities. Pain primarily located  in superiorlateral L GHJ, with pain provoked primarily during eluviation/horizontal abduction planes. No pain upon palpation throughout GHJ. Pt with intermittent signs of GHJ instability, with occasional apprehension and pain during full horizontal abduction or abduction/external rotation motion resulting in Pt guarding L UE with R UE in adduction/internal rotation against trunk. Positive drop arm test and weakness noted in rotator cuff musculature, indicating rotator cuff pathology. Positive on all special tests involving elevation of L UE. Pt with mild SOB and respiratory distress during clinic distance ambulation likely related to CHF/physical deconditioning. Pt with good motivation to perform physical activity and responds well to cueing.    Pt prognosis is Good.  Pt will benefit from skilled outpatient physical therapy to address the above stated deficits, provide pt/family education and to maximize pt's level of independence.     Plan of care discussed with patient: Yes  Pt's spiritual, cultural and educational needs considered and patient is agreeable to the plan of care and goals as stated below:     Anticipated Barriers for therapy: CHF, Pacemaker    Medical Necessity is demonstrated by the following  History  Co-morbidities and personal factors that may impact the plan of care Co-morbidities:   CHF and HTN, pacemaker, physical deconditioning    Personal Factors:   no deficits     moderate   Examination  Body Structures and Functions, activity limitations and participation restrictions that may impact the plan of care Body Regions:   neck  back  upper extremities  trunk    Body Systems:    gross symmetry  ROM  strength  gross coordinated movement  transfers  transitions  motor control  motor learning  heart rate  respiratory rate    Participation Restrictions:   Reaching, lifting, carrying, pushing/pulling, moving equipment, throwing    Activity limitations:   Learning and applying knowledge  no  deficits    General Tasks and Commands  no deficits    Communication  no deficits    Mobility  lifting and carrying objects  fine hand use (grasping/picking up)  driving (bike, car, motorcycle)    Self care  washing oneself (bathing, drying, washing hands)  caring for body parts (brushing teeth, shaving, grooming)  toileting  dressing  looking after one's health    Domestic Life  shopping  cooking  doing house work (cleaning house, washing dishes, laundry)  assisting others    Interactions/Relationships  No deficits    Life Areas  No deficits    Community and Social Life  No deficits         high   Clinical Presentation evolving clinical presentation with changing clinical characteristics moderate   Decision Making/ Complexity Score: moderate     Pt's spiritual, cultural and educational needs considered and pt agreeable to plan of care and goals as stated below:     Short Term GOALS: 4 weeks. Pt agrees with goals set.  1. Patient demonstrates independence with HEP.   2. Patient demonstrates independence with Postural Awareness.   3. Patient demonstrates independence with body mechanics.   4. Patient will report pain of 4/10 at worst, on 0-10 pain scale, with all activity  5. Pt will increase GHJ ROM by 10 degrees in elevation to improve functional reach pain free.   6. Pt will increase UE strength by 1/3 muscle grade or greater on MMT to improve tolerance to all functional activities pain free.     Long Term Goals 8 Weeks. Pt agrees with goals set:  1. Pt will increase GHJ ROM to 115 degrees actively elevation or greater improve functional reach pain free.   2. Pt will increase UE strength to 4/5 to improve tolerance to all functional activities pain free.   3. Pt demonstrates improved function per FOTO Shoulder Survey to 30% Limitation or less.   4. Patient will report pain of 2/10 at worst, on 0-10 pain scale, with all activity  5. Patient demonstrates ability to lift object overhead, proper movement pattern, no  pain    Plan     Plan of care Certification: 10/24/2019 to 1/24/20.    Outpatient Physical Therapy 2 times weekly for 8 weeks to include the following interventions: Cervical/Lumbar Traction, Electrical Stimulation IFC/NMES, Iontophoresis (with Dexamethasone), Manual Therapy, Moist Heat/ Ice, Neuromuscular Re-ed, Patient Education, Self Care, Therapeutic Activites, Therapeutic Exercise, Ultrasound and Dry Needling. Pt may be seen by PTA as part of the rehabilitation team.    Piero Baker, PT  10/24/2019    I have seen the patient, reviewed the therapist's plan of care, and I agree with the plan of care.      I certify the need for these services furnished under this plan of treatment and while under my care.     ___________________ ________ Physician/Referring Practitioner            ___________________________ Date of Signature

## 2019-10-30 ENCOUNTER — CLINICAL SUPPORT (OUTPATIENT)
Dept: REHABILITATION | Facility: HOSPITAL | Age: 64
End: 2019-10-30
Attending: FAMILY MEDICINE
Payer: MEDICARE

## 2019-10-30 DIAGNOSIS — G89.29 CHRONIC LEFT SHOULDER PAIN: ICD-10-CM

## 2019-10-30 DIAGNOSIS — M25.512 CHRONIC LEFT SHOULDER PAIN: ICD-10-CM

## 2019-10-30 DIAGNOSIS — M25.612 DECREASED RANGE OF MOTION OF LEFT SHOULDER: ICD-10-CM

## 2019-10-30 PROCEDURE — 97110 THERAPEUTIC EXERCISES: CPT | Mod: HCNC,PN

## 2019-10-30 NOTE — PROGRESS NOTES
Physical Therapy Daily Treatment Note     Name: Papito Bhakta  Clinic Number: [id number]    Therapy Diagnosis:   Encounter Diagnoses   Name Primary?    Chronic left shoulder pain     Decreased range of motion of left shoulder      Physician: Brynn To MD    Visit Date: 10/30/2019  Physician Orders: PT Eval and Treat   Medical Diagnosis from Referral:   S46.012A (ICD-10-CM) - Rotator cuff strain, left, initial encounter   I50.42 (ICD-10-CM) - Chronic combined systolic and diastolic congestive heart failure     Evaluation Date: 10/30/2019  Authorization Period Expiration: 10/15/2020  Plan of Care Expiration: 1/24/19  Visit # / Visits authorized: 2/ 20    Time In: 1:00pm  Time Out: 2:00pm  Total Billable Time: 40 minutes    Precautions: Standard, CHF and pacemaker    Subjective     Pt reports: His L shoulder is not feeling too bad today. He is still apprehensive to lift L UE overhead  He was compliant with home exercise program.  Response to previous treatment: Decreased pain  Functional change: none to report yet    Pain: 2/10  Location: left shoulder      Objective     Papito received therapeutic exercises to develop strength, endurance, ROM, flexibility, posture and core stabilization for 60 minutes including:    UBE 4'/4'  Pulleys Flex/Abd 3'/3' mins  Scap Retraction 2x10  Supine Wand Flexion 2x10    SL ER 1# 3x10  Prone Row 1# 3x10  Table Slides - scaption 2x10    Papito received the following manual therapy techniques: Joint mobilizations and Soft tissue Mobilization were applied to the: L Shoulder for 0 minutes, including    Papito received hot pack for 10 minutes to L Shoulder.    Home Exercises Provided and Patient Education Provided     Education provided:   Written Home Exercises Provided: Patient instructed to cont prior HEP.  Exercises were reviewed and Papito was able to demonstrate them prior to the end of the session.  Papito demonstrated good  understanding of the education provided.     See EMR  under Patient Instructions for exercises provided prior visit.    Assessment     Pt tolerated treatment session very well. Pt with WNL elevation AAROM during pulleys activity today, with no pain reproduced or difficulty noted. Continues to default into slouched sitting posture, with heavy education for postural awareness and scapular retraction activation/endurance throughout session.    Papito is progressing well towards his goals.   Pt prognosis is Good.     Pt will continue to benefit from skilled outpatient physical therapy to address the deficits listed in the problem list box on initial evaluation, provide pt/family education and to maximize pt's level of independence in the home and community environment.     Pt's spiritual, cultural and educational needs considered and pt agreeable to plan of care and goals.     Anticipated barriers to physical therapy: Deconditioning, pacemaker    Goals:  Short Term GOALS: 4 weeks. Pt agrees with goals set.  1. Patient demonstrates independence with HEP. In progress  2. Patient demonstrates independence with Postural Awareness. In progress  3. Patient demonstrates independence with body mechanics.  In progress  4. Patient will report pain of 4/10 at worst, on 0-10 pain scale, with all activity In progress  5. Pt will increase GHJ ROM by 10 degrees in elevation to improve functional reach pain free.  In progress  6. Pt will increase UE strength by 1/3 muscle grade or greater on MMT to improve tolerance to all functional activities pain free.  In progress    Long Term Goals 8 Weeks. Pt agrees with goals set:  1. Pt will increase GHJ ROM to 115 degrees actively elevation or greater improve functional reach pain free.  In progress  2. Pt will increase UE strength to 4/5 to improve tolerance to all functional activities pain free.  In progress  3. Pt demonstrates improved function per FOTO Shoulder Survey to 30% Limitation or less.  In progress  4. Patient will report pain of  2/10 at worst, on 0-10 pain scale, with all activity In progress  5. Patient demonstrates ability to lift object overhead, proper movement pattern, no pain In progress    Plan     Progress ROM/strengthening activities as tolerated    Piero Baker, PT

## 2019-11-06 ENCOUNTER — CLINICAL SUPPORT (OUTPATIENT)
Dept: REHABILITATION | Facility: HOSPITAL | Age: 64
End: 2019-11-06
Attending: FAMILY MEDICINE
Payer: MEDICARE

## 2019-11-06 DIAGNOSIS — M25.612 DECREASED RANGE OF MOTION OF LEFT SHOULDER: ICD-10-CM

## 2019-11-06 DIAGNOSIS — G89.29 CHRONIC LEFT SHOULDER PAIN: ICD-10-CM

## 2019-11-06 DIAGNOSIS — M25.512 CHRONIC LEFT SHOULDER PAIN: ICD-10-CM

## 2019-11-06 PROCEDURE — 97110 THERAPEUTIC EXERCISES: CPT | Mod: HCNC,PN

## 2019-11-06 NOTE — PROGRESS NOTES
"  Physical Therapy Daily Treatment Note     Name: Papito Bhakta  Clinic Number: [id number]    Therapy Diagnosis:   Encounter Diagnoses   Name Primary?    Chronic left shoulder pain     Decreased range of motion of left shoulder      Physician: Brynn To MD    Visit Date: 11/6/2019  Physician Orders: PT Eval and Treat   Medical Diagnosis from Referral:   S46.012A (ICD-10-CM) - Rotator cuff strain, left, initial encounter   I50.42 (ICD-10-CM) - Chronic combined systolic and diastolic congestive heart failure     Evaluation Date: 10/30/2019  Authorization Period Expiration: 10/15/2020  Plan of Care Expiration: 1/24/19  Visit # / Visits authorized: 3/ 20    Time In: 1255  Time Out: 1410  Total Time: 75 minutes  Total Billable Time: 35 minutes (One on One with PTA)    Precautions: Standard, CHF and pacemaker    Subjective     Pt reports: His L shoulder is not feeling too bad today. He is still apprehensive to lift L UE overhead  He was compliant with home exercise program.  Response to previous treatment: Decreased pain  Functional change: none to report yet    Pain: 4/10  Location: left shoulder      Objective     Papito received therapeutic exercises to develop strength, endurance, ROM, flexibility, posture and core stabilization for 57 minutes including:    UBE 4'/4'  Pulleys Flex/Abd 3'/3' mins    Scap Retraction 2x10 (3" hold)  Supine Wand Flexion 2x10    Standing:  +Wall walks with LUE x10  +Roll ball up wall 5" hold at top x10 reps    SL ER 1# 3x10  Prone Row 1# 3x10  Table Slides - scaption 2x10 (hold 3-5")    Papito received the following manual therapy techniques: Joint mobilizations and PROM were applied to the: L Shoulder for 8 minutes, including    PROM to L shoulder in all planes.  ER at 45* abduction  Papito received hot pack for 10 minutes to L Shoulder.    Home Exercises Provided and Patient Education Provided     Education provided:   Written Home Exercises Provided: Patient instructed to cont prior " HEP.  Exercises were reviewed and Papito was able to demonstrate them prior to the end of the session.  Papito demonstrated good  understanding of the education provided.     See EMR under Patient Instructions for exercises provided prior visit.    Assessment     Pt tolerated treatment session very well.  Challenged with new therex with proper muscle fatigue achieved.  Continues to display improved AAROM with flexion and abduction. Requires max cues for upright posture/postural awareness with all exercises.  Improved ROM since initial evaluation, but experiences discomfort with end range flexion, abduction, and ER.  Early scapular elevated with shoulder flexion/abduction and would benefit from further periscapular strengthening.     Papito is progressing well towards his goals.   Pt prognosis is Good.     Pt will continue to benefit from skilled outpatient physical therapy to address the deficits listed in the problem list box on initial evaluation, provide pt/family education and to maximize pt's level of independence in the home and community environment.     Pt's spiritual, cultural and educational needs considered and pt agreeable to plan of care and goals.     Anticipated barriers to physical therapy: Deconditioning, pacemaker    Goals:  Short Term GOALS: 4 weeks. Pt agrees with goals set.  1. Patient demonstrates independence with HEP. In progress  2. Patient demonstrates independence with Postural Awareness. In progress  3. Patient demonstrates independence with body mechanics.  In progress  4. Patient will report pain of 4/10 at worst, on 0-10 pain scale, with all activity In progress  5. Pt will increase GHJ ROM by 10 degrees in elevation to improve functional reach pain free.  In progress  6. Pt will increase UE strength by 1/3 muscle grade or greater on MMT to improve tolerance to all functional activities pain free.  In progress    Long Term Goals 8 Weeks. Pt agrees with goals set:  1. Pt will increase GHJ  ROM to 115 degrees actively elevation or greater improve functional reach pain free.  In progress  2. Pt will increase UE strength to 4/5 to improve tolerance to all functional activities pain free.  In progress  3. Pt demonstrates improved function per FOTO Shoulder Survey to 30% Limitation or less.  In progress  4. Patient will report pain of 2/10 at worst, on 0-10 pain scale, with all activity In progress  5. Patient demonstrates ability to lift object overhead, proper movement pattern, no pain In progress    Plan     Progress ROM/strengthening activities as tolerated    Valarie Jacobs, PTA

## 2019-11-12 ENCOUNTER — OFFICE VISIT (OUTPATIENT)
Dept: CARDIOLOGY | Facility: CLINIC | Age: 64
End: 2019-11-12
Payer: MEDICARE

## 2019-11-12 VITALS
OXYGEN SATURATION: 96 % | HEIGHT: 77 IN | WEIGHT: 310.19 LBS | HEART RATE: 83 BPM | BODY MASS INDEX: 36.63 KG/M2 | DIASTOLIC BLOOD PRESSURE: 74 MMHG | SYSTOLIC BLOOD PRESSURE: 131 MMHG

## 2019-11-12 DIAGNOSIS — I10 HYPERTENSION: ICD-10-CM

## 2019-11-12 DIAGNOSIS — I50.9 CONGESTIVE HEART FAILURE, UNSPECIFIED HF CHRONICITY, UNSPECIFIED HEART FAILURE TYPE: Primary | ICD-10-CM

## 2019-11-12 PROCEDURE — 93000 ELECTROCARDIOGRAM COMPLETE: CPT | Mod: HCNC,S$GLB,, | Performed by: INTERNAL MEDICINE

## 2019-11-12 PROCEDURE — 3075F PR MOST RECENT SYSTOLIC BLOOD PRESS GE 130-139MM HG: ICD-10-PCS | Mod: HCNC,CPTII,S$GLB, | Performed by: INTERNAL MEDICINE

## 2019-11-12 PROCEDURE — 3008F BODY MASS INDEX DOCD: CPT | Mod: HCNC,CPTII,S$GLB, | Performed by: INTERNAL MEDICINE

## 2019-11-12 PROCEDURE — 99999 PR PBB SHADOW E&M-EST. PATIENT-LVL III: ICD-10-PCS | Mod: PBBFAC,HCNC,, | Performed by: INTERNAL MEDICINE

## 2019-11-12 PROCEDURE — 3075F SYST BP GE 130 - 139MM HG: CPT | Mod: HCNC,CPTII,S$GLB, | Performed by: INTERNAL MEDICINE

## 2019-11-12 PROCEDURE — 3078F PR MOST RECENT DIASTOLIC BLOOD PRESSURE < 80 MM HG: ICD-10-PCS | Mod: HCNC,CPTII,S$GLB, | Performed by: INTERNAL MEDICINE

## 2019-11-12 PROCEDURE — 99214 PR OFFICE/OUTPT VISIT, EST, LEVL IV, 30-39 MIN: ICD-10-PCS | Mod: HCNC,S$GLB,, | Performed by: INTERNAL MEDICINE

## 2019-11-12 PROCEDURE — 3008F PR BODY MASS INDEX (BMI) DOCUMENTED: ICD-10-PCS | Mod: HCNC,CPTII,S$GLB, | Performed by: INTERNAL MEDICINE

## 2019-11-12 PROCEDURE — 99214 OFFICE O/P EST MOD 30 MIN: CPT | Mod: HCNC,S$GLB,, | Performed by: INTERNAL MEDICINE

## 2019-11-12 PROCEDURE — 3078F DIAST BP <80 MM HG: CPT | Mod: HCNC,CPTII,S$GLB, | Performed by: INTERNAL MEDICINE

## 2019-11-12 PROCEDURE — 93000 EKG 12-LEAD: ICD-10-PCS | Mod: HCNC,S$GLB,, | Performed by: INTERNAL MEDICINE

## 2019-11-12 PROCEDURE — 99999 PR PBB SHADOW E&M-EST. PATIENT-LVL III: CPT | Mod: PBBFAC,HCNC,, | Performed by: INTERNAL MEDICINE

## 2019-11-12 RX ORDER — RAMIPRIL 5 MG/1
5 CAPSULE ORAL DAILY
Qty: 90 CAPSULE | Refills: 3 | Status: SHIPPED | OUTPATIENT
Start: 2019-11-12 | End: 2020-01-22

## 2019-11-12 RX ORDER — CARVEDILOL 6.25 MG/1
6.25 TABLET ORAL 2 TIMES DAILY
Qty: 180 TABLET | Refills: 3 | Status: SHIPPED | OUTPATIENT
Start: 2019-11-12 | End: 2020-01-22

## 2019-11-12 NOTE — PROGRESS NOTES
CARDIOVASCULAR CONSULTATION    REASON FOR CONSULT:   Papito Bhakta is a 64 y.o. male who presents for nonischemic cardiomyopathy.      HISTORY OF PRESENT ILLNESS:     Patient is a 64-year-old man who used to follow with .  He has a history of nonischemic cardiomyopathy status post Bi V AICD implantation.  For nonischemic cardiomyopathy.  His last known ejection fraction is 10%.  Patient states that he has been doing fine.  Denies any chest pains at rest on exertion, orthopnea, PND, shortness of breath at rest or dyspnea on exertion.  States can go up or down 3 to flight flights of stairs without any problem.  Does have chronic pedal edema in his right foot which has been there for many years.  Denies any worsening of pedal edema.       ECHO:  1-19  · Limited study to assess function  · Severely decreased left ventricular systolic function. The estimated ejection fraction is 10%  · Left ventricular diastolic dysfunction.  · Severely reduced right ventricular systolic function.  · No thrombus     Right/left heart catheterization:  2-18  B. Summary/Post-Operative Diagnosis       Normal coronary arteries.    Systolic dysfunction.    Low right and left Filling Pressures.    Mild Pulmonary Hypertension.               PAST MEDICAL HISTORY:     Past Medical History:   Diagnosis Date    Anticoagulant long-term use     Aspirin    CHF (congestive heart failure)     Hyperlipidemia     Hypertension        PAST SURGICAL HISTORY:     Past Surgical History:   Procedure Laterality Date    INSERTION OF BIVENTRICULAR IMPLANTABLE CARDIOVERTER-DEFIBRILLATOR (ICD) N/A 2/13/2019    Procedure: INSERTION, ICD, BIVENTRICULAR;  Surgeon: Shailesh Eng MD;  Location: [facility] CATH LAB;  Service: Cardiology;  Laterality: N/A;  RN PRE OP 2-6-19  1ST CASE PER  [facility]. NOTIFIED RADHA THAT ANESTHESIA IS NOT PITO FOR 1ST CASE START-LO    oral extraction  11/2018    TESTICLE SURGERY         ALLERGIES AND MEDICATION:   Review of  patient's allergies indicates:  No Known Allergies     Medication List           Accurate as of 2019  9:27 AM. If you have any questions, ask your nurse or doctor.               CONTINUE taking these medications    aspirin 325 MG tablet     carvedilol 3.125 MG tablet  Commonly known as:  COREG  TAKE 1 TABLET TWICE DAILY     diclofenac sodium 1 % Gel  Commonly known as:  VOLTAREN  Apply 2 g topically 4 (four) times daily.     furosemide 80 MG tablet  Commonly known as:  LASIX  Take 1 tablet (80 mg total) by mouth once daily.     pravastatin 80 MG tablet  Commonly known as:  PRAVACHOL  Take 1 tablet (80 mg total) by mouth once daily.     ramipril 2.5 MG capsule  Commonly known as:  ALTACE  Take 1 capsule (2.5 mg total) by mouth once daily.     spironolactone 25 MG tablet  Commonly known as:  ALDACTONE  Take 1 tablet (25 mg total) by mouth once daily.            SOCIAL HISTORY:     Social History     Socioeconomic History    Marital status:      Spouse name: Not on file    Number of children: Not on file    Years of education: Not on file    Highest education level: Not on file   Occupational History    Not on file   Social Needs    Financial resource strain: Not on file    Food insecurity:     Worry: Not on file     Inability: Not on file    Transportation needs:     Medical: Not on file     Non-medical: Not on file   Tobacco Use    Smoking status: Former Smoker     Packs/day: 0.50     Years: 40.00     Pack years: 20.00     Types: Cigarettes, Cigars     Last attempt to quit:      Years since quittin.8    Smokeless tobacco: Never Used   Substance and Sexual Activity    Alcohol use: Yes     Comment: once a month    Drug use: No    Sexual activity: Yes     Birth control/protection: None     Comment: uses protection sometimes   Lifestyle    Physical activity:     Days per week: Not on file     Minutes per session: Not on file    Stress: Not on file   Relationships    Social  "connections:     Talks on phone: Not on file     Gets together: Not on file     Attends Confucianism service: Not on file     Active member of club or organization: Not on file     Attends meetings of clubs or organizations: Not on file     Relationship status: Not on file   Other Topics Concern    Not on file   Social History Narrative    Not on file       FAMILY HISTORY:     Family History   Problem Relation Age of Onset    Epilepsy Mother        REVIEW OF SYSTEMS:   Review of Systems   Constitution: Negative.   HENT: Negative.    Eyes: Negative.    Cardiovascular: Positive for leg swelling.   Respiratory: Negative.    Endocrine: Negative.    Hematologic/Lymphatic: Negative.    Skin: Negative.    Musculoskeletal: Negative.    Gastrointestinal: Negative.    Genitourinary: Negative.    Neurological: Negative.    Psychiatric/Behavioral: Negative.    Allergic/Immunologic: Negative.        A 10 point review of systems was performed and all the pertinent positives have been mentioned. Rest of review of systems was negative.        PHYSICAL EXAM:     Vitals:    11/12/19 0914   BP: 131/74   Pulse: 83    Body mass index is 36.78 kg/m².  Weight: (!) 140.7 kg (310 lb 3 oz)   Height: 6' 5" (195.6 cm)     Physical Exam   Constitutional: He is oriented to person, place, and time. He appears well-developed and well-nourished.   HENT:   Head: Normocephalic.   Eyes: Pupils are equal, round, and reactive to light. Conjunctivae are normal.   Neck: Normal range of motion. Neck supple.   Cardiovascular: Normal rate, regular rhythm and normal heart sounds.   Pulmonary/Chest: Effort normal and breath sounds normal.   Abdominal: Soft. Bowel sounds are normal.   Neurological: He is alert and oriented to person, place, and time.   Skin: Skin is warm.   Vitals reviewed.        DATA:     Laboratory:  CBC:  Recent Labs   Lab 01/12/19  0626 01/13/19  0658 02/06/19  1530   WBC 10.45 8.45 5.01   Hemoglobin 11.3 L 11.4 L 11.3 L   Hematocrit 34.6 " L 32.5 L 35.3 L   Platelets 320 363 H 181       CHEMISTRIES:  Recent Labs   Lab 01/11/19  0200 01/12/19  0626 01/13/19  0658 02/06/19  1530   Glucose 93 94 96 92   Sodium 141 139 137 141   Potassium 3.5 3.6 3.5 3.6   BUN, Bld 25 H 31 H 32 H 17   Creatinine 1.5 H 1.7 H 1.5 H 1.1   eGFR if  56 A 49 A 56 A >60   eGFR if non  49 A 42 A 49 A >60   Calcium 8.8 9.0 9.2 9.8   Magnesium 2.2 2.3 2.4  --        CARDIAC BIOMARKERS:  Recent Labs   Lab 01/05/19  0837 01/05/19  1432 01/05/19  2113   Troponin I 0.045 H 0.059 H 0.084 H       COAGS:  Recent Labs   Lab 01/30/18  1615 01/05/19  0837 02/06/19  1530   INR 1.0 1.1 1.1       LIPIDS/LFTS:  Recent Labs   Lab 12/02/17  0823 11/05/18  0619 01/05/19  0837   Cholesterol 160  --   --    Triglycerides 106  --   --    HDL 46  --   --    LDL Cholesterol 92.8  --   --    Non-HDL Cholesterol 114  --   --    AST 17 16 28   ALT 11 18 23       No results found for: HGBA1C    TSH        The 10-year ASCVD risk score (Shaila ANN Jr., et al., 2013) is: 15.6%    Values used to calculate the score:      Age: 64 years      Sex: Male      Is Non- : Yes      Diabetic: No      Tobacco smoker: No      Systolic Blood Pressure: 131 mmHg      Is BP treated: Yes      HDL Cholesterol: 46 mg/dL      Total Cholesterol: 160 mg/dL           Cardiovascular Testing:    Personally reviewed as above      ASSESSMENT AND PLAN     Patient Active Problem List   Diagnosis    Other hyperlipidemia    Essential hypertension    Chronic combined systolic and diastolic congestive heart failure    BMI 40.0-44.9, adult    Hyperlipidemia    Severe obesity (BMI 35.0-39.9) with comorbidity    Biventricular ICD (implantable cardioverter-defibrillator) in place    Chronic left shoulder pain    Decreased range of motion of left shoulder    Decreased strength of upper extremity         1.  Nonischemic cardiomyopathy with ejection fraction of 10%.  Patient on Coreg and  ramipril and spironolactone.  I will increase the dose of Coreg to 6.25 mg b.i.d. as well as the dose of ramipril to 5 mg daily with the goal to titrate up to the maximally tolerated dose.  Patient is currently euvolemic on current dose of Lasix.  Continue current therapy.    2.  AICD:  Patient status post Bi V AICD placement.  We will to ICD check at the next clinic visit.    3.  Continue aggressive risk factor modification low-salt diet has been recommended.        Thank you very much for involving me in the care of your patient.  Please do not hesitate to contact me if there are any questions.      Makayla Long MD, FACC, [facility]  Interventional Cardiologist, [facility].           This note was dictated with the help of speech recognition software.  There might be un-intended errors and/or substitutions.

## 2019-11-13 ENCOUNTER — CLINICAL SUPPORT (OUTPATIENT)
Dept: REHABILITATION | Facility: HOSPITAL | Age: 64
End: 2019-11-13
Attending: FAMILY MEDICINE
Payer: MEDICARE

## 2019-11-13 DIAGNOSIS — G89.29 CHRONIC LEFT SHOULDER PAIN: ICD-10-CM

## 2019-11-13 DIAGNOSIS — M25.612 DECREASED RANGE OF MOTION OF LEFT SHOULDER: ICD-10-CM

## 2019-11-13 DIAGNOSIS — M25.512 CHRONIC LEFT SHOULDER PAIN: ICD-10-CM

## 2019-11-13 PROCEDURE — 97110 THERAPEUTIC EXERCISES: CPT | Mod: HCNC,PN

## 2019-11-13 NOTE — PROGRESS NOTES
"  Physical Therapy Daily Treatment Note     Name: Papito Bhakta  Clinic Number: [id number]    Therapy Diagnosis:   Encounter Diagnoses   Name Primary?    Chronic left shoulder pain     Decreased range of motion of left shoulder      Physician: Brynn To MD    Visit Date: 11/13/2019  Physician Orders: PT Eval and Treat   Medical Diagnosis from Referral:   S46.012A (ICD-10-CM) - Rotator cuff strain, left, initial encounter   I50.42 (ICD-10-CM) - Chronic combined systolic and diastolic congestive heart failure     Evaluation Date: 10/30/2019  Authorization Period Expiration: 10/15/2020  Plan of Care Expiration: 1/24/19  Visit # / Visits authorized: 4/ 20    Time In: 1250  Time Out: 1358  Total Time: 68 minutes  Total Billable Time: 35 minutes (One on One with PTA)    Precautions: Standard, CHF and pacemaker    Subjective     Pt reports: His L shoulder is not feeling too bad today. He is still apprehensive to lift L UE overhead  He was compliant with home exercise program.  Response to previous treatment: Decreased pain  Functional change: none to report yet    Pain: 4/10  Location: left shoulder      Objective     Papito received therapeutic exercises to develop strength, endurance, ROM, flexibility, posture and core stabilization for 58 minutes including:        UBE 4'/4'  Pulleys Flex/Scaption 3'/3' mins (2 blue foam pads in pt's seat)  Seated table slides (scaption) (2 blue foam pads in pt's seat)    Standing:  Wall walks with LUE 2x10  Roll green SB ball up wall 5" hold at top x10 reps  Wall circles (CW/CCW using pillow case) x20 each  Scap Retraction with RED TB 2x10   +Shoulder Extension with RED TB 2x10  Standing wall slides flexion x10 (3" hold)- painful      SL ER 1# 3x10  Prone Row 1# 3x10  Supine Wand Flexion 2x10        Papito received the following manual therapy techniques: Joint mobilizations and PROM were applied to the: L Shoulder for 00 minutes, including    PROM to L shoulder in all planes.  ER " at 45* abduction  Papito received hot pack for 10 minutes to L Shoulder.    Home Exercises Provided and Patient Education Provided     Education provided:   Written Home Exercises Provided: Patient instructed to cont prior HEP.  Exercises were reviewed and Papito was able to demonstrate them prior to the end of the session.  Papito demonstrated good  understanding of the education provided.     See EMR under Patient Instructions for exercises provided prior visit.    Assessment     Pt tolerated treatment session very well.  Attempted wall slides on wall, however, painful for pt to perform.  Continues to display improved AAROM with flexion Requires max cues for upright posture/postural awareness with all exercises.  Improved ROM since initial evaluation, but experiences discomfort with end range flexion, abduction, and ER.  Early scapular elevated with shoulder flexion/abduction and would benefit from further periscapular strengthening.     Papito is progressing well towards his goals.   Pt prognosis is Good.     Pt will continue to benefit from skilled outpatient physical therapy to address the deficits listed in the problem list box on initial evaluation, provide pt/family education and to maximize pt's level of independence in the home and community environment.     Pt's spiritual, cultural and educational needs considered and pt agreeable to plan of care and goals.     Anticipated barriers to physical therapy: Deconditioning, pacemaker    Goals:  Short Term GOALS: 4 weeks. Pt agrees with goals set.  1. Patient demonstrates independence with HEP. In progress  2. Patient demonstrates independence with Postural Awareness. In progress  3. Patient demonstrates independence with body mechanics.  In progress  4. Patient will report pain of 4/10 at worst, on 0-10 pain scale, with all activity In progress  5. Pt will increase GHJ ROM by 10 degrees in elevation to improve functional reach pain free.  In progress  6. Pt will  increase UE strength by 1/3 muscle grade or greater on MMT to improve tolerance to all functional activities pain free.  In progress    Long Term Goals 8 Weeks. Pt agrees with goals set:  1. Pt will increase GHJ ROM to 115 degrees actively elevation or greater improve functional reach pain free.  In progress  2. Pt will increase UE strength to 4/5 to improve tolerance to all functional activities pain free.  In progress  3. Pt demonstrates improved function per FOTO Shoulder Survey to 30% Limitation or less.  In progress  4. Patient will report pain of 2/10 at worst, on 0-10 pain scale, with all activity In progress  5. Patient demonstrates ability to lift object overhead, proper movement pattern, no pain In progress    Plan     Progress ROM/strengthening activities as tolerated    Valarie Jacobs, PTA

## 2019-11-20 ENCOUNTER — CLINICAL SUPPORT (OUTPATIENT)
Dept: REHABILITATION | Facility: HOSPITAL | Age: 64
End: 2019-11-20
Attending: FAMILY MEDICINE
Payer: MEDICARE

## 2019-11-20 DIAGNOSIS — G89.29 CHRONIC LEFT SHOULDER PAIN: ICD-10-CM

## 2019-11-20 DIAGNOSIS — M25.612 DECREASED RANGE OF MOTION OF LEFT SHOULDER: ICD-10-CM

## 2019-11-20 DIAGNOSIS — M25.512 CHRONIC LEFT SHOULDER PAIN: ICD-10-CM

## 2019-11-20 DIAGNOSIS — R29.898 DECREASED STRENGTH OF UPPER EXTREMITY: ICD-10-CM

## 2019-11-20 PROCEDURE — 97110 THERAPEUTIC EXERCISES: CPT | Mod: HCNC,PN

## 2019-11-20 NOTE — PROGRESS NOTES
Physical Therapy Reassessment Note     Name: Papito Bhakta  Clinic Number: [id number]    Therapy Diagnosis:   Encounter Diagnoses   Name Primary?    Chronic left shoulder pain     Decreased range of motion of left shoulder     Decreased strength of upper extremity      Physician: Brynn To MD    Visit Date: 11/20/2019  Physician Orders: PT Eval and Treat   Medical Diagnosis from Referral:   S46.012A (ICD-10-CM) - Rotator cuff strain, left, initial encounter   I50.42 (ICD-10-CM) - Chronic combined systolic and diastolic congestive heart failure     Evaluation Date: 10/30/2019  Authorization Period Expiration: 10/15/2020  Plan of Care Expiration: 1/24/19  Visit # / Visits authorized: 5/ 20 (PN to be performed by 12/20)    Time In: 1:11pm  Time Out: 2:20  Total Time: 60 minutes  Total Billable Time: 60 minutes  Precautions: Standard, CHF and pacemaker    Subjective     Pt reports: He is feeling improved overall. Cold weather can cause pain and stiffness to return  He was compliant with home exercise program.  Response to previous treatment: Decreased pain  Functional change: none to report yet    Pain: 2/10  Location: left shoulder      Objective     Shoulder Range of Motion:   ACTIVE ROM LEFT RIGHT   Flexion 110 160   Abduction 120 160   Functional IR T12 T7   Functional ER Upper trap/C7 T3      PASSIVE ROM LEFT RIGHT   Flexion 140 170   Abduction 140 170   IR/90deg 80 90   ER/90deg 75 90      STRENGTH LEFT RIGHT   Flexion 3+/5 4/5   Abduction 3+/5 4/5   IR (neutral) 4-/5 4/5   ER (neutral) 3+/5 4/5   Middle Trap 3-/5 3-/5   Lower Trap 3-/5 3-/5      Papito received therapeutic exercises to develop strength, endurance, ROM, flexibility, posture and core stabilization for 58 minutes including:    UBE 4'/4'   +Pec Str on 1/2 Foam 3 mins  +Supine Wand Flexion on 1/2 Foam 2 x 10  +Supine Serratus Punch on 1/2 Foam 2x10  +Chest Press on 1/2 Foam 3 x 10  Pulleys Flex/Scaption 3'/3' mins (2 blue foam pads in pt's  "seat)  Seated table slides (scaption) 2x10 (2 blue foam pads in pt's seat)  SL ER 1# 3x10  Prone Row 2# 3x10    Standing:  Wall walks with LUE 2x10  Roll green SB ball up wall 5" hold at top x10 reps  Wall circles (CW/CCW using pillow case) x20 each  Standing Rows with Red TB 3 x 10   +Shoulder Extension with Red TB 3 x 10  Standing wall slides flexion x10 (3" hold)- painful    Papito received the following manual therapy techniques: Joint mobilizations and PROM were applied to the: L Shoulder for 00 minutes, including    PROM to L shoulder in all planes.  ER at 45* abduction  Papito received hot pack for 10 minutes to L Shoulder.    Home Exercises Provided and Patient Education Provided     Education provided:   Written Home Exercises Provided: Patient instructed to cont prior HEP.  Exercises were reviewed and Papito was able to demonstrate them prior to the end of the session.  Papito demonstrated good  understanding of the education provided.     See EMR under Patient Instructions for exercises provided prior visit.    Assessment     Pt tolerated treatment session very well, with reassessment note performed. Pt progressing with active L UE ROM in elevation planes, with increased motion excursion with pain at available end-range. Mild improvements in L GHJ Strength, however continues to have difficulty with AAROM activiites. Therex performed on 1/2 foam today for promotion of neutral trunk alignment and improved scapulohumeral rhythm     Papito is progressing well towards his goals.   Pt prognosis is Good.     Pt will continue to benefit from skilled outpatient physical therapy to address the deficits listed in the problem list box on initial evaluation, provide pt/family education and to maximize pt's level of independence in the home and community environment.     Pt's spiritual, cultural and educational needs considered and pt agreeable to plan of care and goals.     Anticipated barriers to physical therapy: " Deconditioning, pacemaker    Goals:  Short Term GOALS: 4 weeks. Pt agrees with goals set. Goals updated 11/20  1. Patient demonstrates independence with HEP. In progress  2. Patient demonstrates independence with Postural Awareness. In progress  3. Patient demonstrates independence with body mechanics.  In progress  4. Patient will report pain of 4/10 at worst, on 0-10 pain scale, with all activity In progress  5. Pt will increase GHJ ROM by 10 degrees in elevation to improve functional reach pain free.  In progress  6. Pt will increase UE strength by 1/3 muscle grade or greater on MMT to improve tolerance to all functional activities pain free.  In progress    Long Term Goals 8 Weeks. Pt agrees with goals set:  1. Pt will increase GHJ ROM to 115 degrees actively elevation or greater improve functional reach pain free.  In progress  2. Pt will increase UE strength to 4/5 to improve tolerance to all functional activities pain free.  In progress  3. Pt demonstrates improved function per FOTO Shoulder Survey to 30% Limitation or less.  In progress  4. Patient will report pain of 2/10 at worst, on 0-10 pain scale, with all activity In progress  5. Patient demonstrates ability to lift object overhead, proper movement pattern, no pain In progress    Plan     Progress ROM/strengthening activities as tolerated    Piero Baker, PT

## 2019-12-04 ENCOUNTER — CLINICAL SUPPORT (OUTPATIENT)
Dept: REHABILITATION | Facility: HOSPITAL | Age: 64
End: 2019-12-04
Attending: FAMILY MEDICINE
Payer: MEDICARE

## 2019-12-04 DIAGNOSIS — G89.29 CHRONIC LEFT SHOULDER PAIN: Primary | ICD-10-CM

## 2019-12-04 DIAGNOSIS — R29.898 DECREASED STRENGTH OF UPPER EXTREMITY: ICD-10-CM

## 2019-12-04 DIAGNOSIS — M25.612 DECREASED RANGE OF MOTION OF LEFT SHOULDER: ICD-10-CM

## 2019-12-04 DIAGNOSIS — M25.512 CHRONIC LEFT SHOULDER PAIN: Primary | ICD-10-CM

## 2019-12-04 PROCEDURE — 97110 THERAPEUTIC EXERCISES: CPT | Mod: HCNC,PN

## 2019-12-04 NOTE — PROGRESS NOTES
"  Physical Therapy Progress Note     Name: Papito Bhakta  Clinic Number: [id number]    Therapy Diagnosis:   Encounter Diagnoses   Name Primary?    Chronic left shoulder pain Yes    Decreased range of motion of left shoulder     Decreased strength of upper extremity      Physician: Brynn To MD    Visit Date: 12/4/2019  Physician Orders: PT Eval and Treat   Medical Diagnosis from Referral:   S46.012A (ICD-10-CM) - Rotator cuff strain, left, initial encounter   I50.42 (ICD-10-CM) - Chronic combined systolic and diastolic congestive heart failure     Evaluation Date: 10/30/2019  Authorization Period Expiration: 10/15/2020  Plan of Care Expiration: 1/24/19  Visit # / Visits authorized: 6/ 20 (PN to be performed by 12/20)    Time In: 1:00pm  Time Out: 1:40pm  Total Time: 30 minutes  Total Billable Time: 30 minutes  Precautions: Standard, CHF and pacemaker    Subjective     Pt reports: His shoulder is feeling similar throughout episode of care, minimal improvement. He feels like he "might be catching a cold". Pt skipped lunch today and didn't eat since the morning time.  He was compliant with home exercise program.  Response to previous treatment: Decreased pain  Functional change: none to report yet    Pain: 2/10  Location: left shoulder      Objective     Shoulder Range of Motion:   ACTIVE ROM LEFT RIGHT   Flexion 110 160   Abduction 120 160   Functional IR T12 T7   Functional ER Upper trap/C7 T3      PASSIVE ROM LEFT RIGHT   Flexion 140 170   Abduction 140 170   IR/90deg 80 90   ER/90deg 75 90      STRENGTH LEFT RIGHT   Flexion 3+/5 4/5   Abduction 3+/5 4/5   IR (neutral) 4-/5 4/5   ER (neutral) 3+/5 4/5   Middle Trap 3-/5 3-/5   Lower Trap 3-/5 3-/5      Papito received therapeutic exercises to develop strength, endurance, ROM, flexibility, posture and core stabilization for 58 minutes including:    UBE 3'/3'   +Seated Table Slides with inclined mat 2x10  Pulleys Flex/Scaption 3'/3' mins (2 blue foam pads in " "pt's seat)    +Pec Str on 1/2 Foam 3 mins  +Supine Wand Flexion on 1/2 Foam 2 x 10  +Supine Serratus Punch on 1/2 Foam 2x10  +Chest Press on 1/2 Foam 3 x 10  SL ER 1# 3x10  Prone Row 2# 3x10    Standing:  Wall walks with LUE 2x10  Roll green SB ball up wall 5" hold at top x10 reps  Wall circles (CW/CCW using pillow case) x20 each  Standing Rows with Red TB 3 x 10   +Shoulder Extension with Red TB 3 x 10  Standing wall slides flexion x10 (3" hold)- painful    Papito received the following manual therapy techniques: Joint mobilizations and PROM were applied to the: L Shoulder for 00 minutes, including    PROM to L shoulder in all planes.  ER at 45* abduction  Papito received hot pack for 10 minutes to L Shoulder.    Home Exercises Provided and Patient Education Provided     Education provided:   Written Home Exercises Provided: Patient instructed to cont prior HEP.  Exercises were reviewed and Papito was able to demonstrate them prior to the end of the session.  Papito demonstrated good  understanding of the education provided.     See EMR under Patient Instructions for exercises provided prior visit.    Assessment     Pt tolerated treatment session poorly this session. Pt began feeling malaise and light-headed during standing exercises this session, resulting in sitting rest break and water/sugar candy provided. Pt requested to perform shortened treatment session due to feeling ill. Pt left clinic in no observable distress, LOB, or nausea. Plan to return to full treatment session pending symptoms next visit     Papito is progressing well towards his goals.   Pt prognosis is Good.     Pt will continue to benefit from skilled outpatient physical therapy to address the deficits listed in the problem list box on initial evaluation, provide pt/family education and to maximize pt's level of independence in the home and community environment.     Pt's spiritual, cultural and educational needs considered and pt agreeable to plan " of care and goals.     Anticipated barriers to physical therapy: Deconditioning, pacemaker    Goals:  Short Term GOALS: 4 weeks. Pt agrees with goals set. Goals updated 11/20  1. Patient demonstrates independence with HEP. In progress  2. Patient demonstrates independence with Postural Awareness. In progress  3. Patient demonstrates independence with body mechanics.  In progress  4. Patient will report pain of 4/10 at worst, on 0-10 pain scale, with all activity In progress  5. Pt will increase GHJ ROM by 10 degrees in elevation to improve functional reach pain free.  In progress  6. Pt will increase UE strength by 1/3 muscle grade or greater on MMT to improve tolerance to all functional activities pain free.  In progress    Long Term Goals 8 Weeks. Pt agrees with goals set:  1. Pt will increase GHJ ROM to 115 degrees actively elevation or greater improve functional reach pain free.  In progress  2. Pt will increase UE strength to 4/5 to improve tolerance to all functional activities pain free.  In progress  3. Pt demonstrates improved function per FOTO Shoulder Survey to 30% Limitation or less.  In progress  4. Patient will report pain of 2/10 at worst, on 0-10 pain scale, with all activity In progress  5. Patient demonstrates ability to lift object overhead, proper movement pattern, no pain In progress    Plan     Progress ROM/strengthening activities as tolerated    Piero Baker, PT

## 2019-12-11 ENCOUNTER — CLINICAL SUPPORT (OUTPATIENT)
Dept: REHABILITATION | Facility: HOSPITAL | Age: 64
End: 2019-12-11
Attending: FAMILY MEDICINE
Payer: MEDICARE

## 2019-12-11 DIAGNOSIS — G89.29 CHRONIC LEFT SHOULDER PAIN: Primary | ICD-10-CM

## 2019-12-11 DIAGNOSIS — M25.612 DECREASED RANGE OF MOTION OF LEFT SHOULDER: ICD-10-CM

## 2019-12-11 DIAGNOSIS — R29.898 DECREASED STRENGTH OF UPPER EXTREMITY: ICD-10-CM

## 2019-12-11 DIAGNOSIS — M25.512 CHRONIC LEFT SHOULDER PAIN: Primary | ICD-10-CM

## 2019-12-11 PROCEDURE — 97110 THERAPEUTIC EXERCISES: CPT | Mod: HCNC,PN

## 2019-12-11 NOTE — PROGRESS NOTES
"  Physical Therapy Progress Note     Name: Papito Bhakta  Clinic Number: [id number]    Therapy Diagnosis:   Encounter Diagnoses   Name Primary?    Chronic left shoulder pain Yes    Decreased range of motion of left shoulder     Decreased strength of upper extremity      Physician: Brynn To MD    Visit Date: 12/11/2019  Physician Orders: PT Eval and Treat   Medical Diagnosis from Referral:   S46.012A (ICD-10-CM) - Rotator cuff strain, left, initial encounter   I50.42 (ICD-10-CM) - Chronic combined systolic and diastolic congestive heart failure     Evaluation Date: 10/30/2019  Authorization Period Expiration: 10/15/2020  Plan of Care Expiration: 1/24/19  Visit # / Visits authorized: 7/ 20 (PN to be performed by 12/20)    Time In: 1:15pm  Time Out: 2:25pm  Total Time: 30 minutes  Total Billable Time: 30 minutes  Precautions: Standard, CHF and pacemaker    Subjective     Pt reports: He came down with a cold this past week, but is feeling better. No issues with L shoulder  He was compliant with home exercise program.  Response to previous treatment: Decreased pain  Functional change: none to report yet    Pain: 2/10  Location: left shoulder      Objective        Papito received therapeutic exercises to develop strength, endurance, ROM, flexibility, posture and core stabilization for 55 minutes including:    UBE 3'/3'   +Seated Table Slides with inclined mat 2x10  Pulleys Flex/Scaption 3'/3' mins (2 blue foam pads in pt's seat)    +Pec Str on 1/2 Foam 3 mins  +Supine Wand Flexion on 1/2 Foam 2 x 10  +Supine Serratus Punch on 1/2 Foam 2x10  +Chest Press on 1/2 Foam 3 x 10  SL ER 1# 3x10  Prone Row 2# 3x10    Standing:  Wall walks with LUE 2x10  Roll green SB ball up wall 5" hold at top x10 reps  Wall circles (CW/CCW using pillow case) x20 each  Standing Rows with Red TB 3 x 10   +Shoulder Extension with Red TB 3 x 10  Standing wall slides flexion x10 (3" hold)- painful    Papito received the following manual therapy " techniques: Joint mobilizations and PROM were applied to the: L Shoulder for 05 minutes, including    PROM to L shoulder in all planes.  ER at 45* abduction    Papito received hot pack for 10 minutes to L Shoulder.    Home Exercises Provided and Patient Education Provided     Education provided:   Written Home Exercises Provided: Patient instructed to cont prior HEP.  Exercises were reviewed and Papito was able to demonstrate them prior to the end of the session.  Papito demonstrated good  understanding of the education provided.     See EMR under Patient Instructions for exercises provided prior visit.    Assessment     Pt tolerated treatment session very well this session. Pt able to achieve L GHJ elevation passively to WNL with no pain noted. Pt progressing well with elpidio-scapular strength and GHJ mobility activities overall. Pt prepared for increased elpidio-scapular challenges next session.     Papito is progressing well towards his goals.   Pt prognosis is Good.     Pt will continue to benefit from skilled outpatient physical therapy to address the deficits listed in the problem list box on initial evaluation, provide pt/family education and to maximize pt's level of independence in the home and community environment.     Pt's spiritual, cultural and educational needs considered and pt agreeable to plan of care and goals.     Anticipated barriers to physical therapy: Deconditioning, pacemaker    Goals:  Short Term GOALS: 4 weeks. Pt agrees with goals set. Goals updated 11/20  1. Patient demonstrates independence with HEP. In progress  2. Patient demonstrates independence with Postural Awareness. In progress  3. Patient demonstrates independence with body mechanics.  In progress  4. Patient will report pain of 4/10 at worst, on 0-10 pain scale, with all activity In progress  5. Pt will increase GHJ ROM by 10 degrees in elevation to improve functional reach pain free.  In progress  6. Pt will increase UE strength by  1/3 muscle grade or greater on MMT to improve tolerance to all functional activities pain free.  In progress    Long Term Goals 8 Weeks. Pt agrees with goals set:  1. Pt will increase GHJ ROM to 115 degrees actively elevation or greater improve functional reach pain free.  In progress  2. Pt will increase UE strength to 4/5 to improve tolerance to all functional activities pain free.  In progress  3. Pt demonstrates improved function per FOTO Shoulder Survey to 30% Limitation or less.  In progress  4. Patient will report pain of 2/10 at worst, on 0-10 pain scale, with all activity In progress  5. Patient demonstrates ability to lift object overhead, proper movement pattern, no pain In progress    Plan     Progress ROM/strengthening activities as tolerated    Piero Baker, PT

## 2019-12-18 ENCOUNTER — CLINICAL SUPPORT (OUTPATIENT)
Dept: REHABILITATION | Facility: HOSPITAL | Age: 64
End: 2019-12-18
Attending: FAMILY MEDICINE
Payer: MEDICARE

## 2019-12-18 DIAGNOSIS — M25.512 CHRONIC LEFT SHOULDER PAIN: Primary | ICD-10-CM

## 2019-12-18 DIAGNOSIS — M25.612 DECREASED RANGE OF MOTION OF LEFT SHOULDER: ICD-10-CM

## 2019-12-18 DIAGNOSIS — G89.29 CHRONIC LEFT SHOULDER PAIN: Primary | ICD-10-CM

## 2019-12-18 DIAGNOSIS — R29.898 DECREASED STRENGTH OF UPPER EXTREMITY: ICD-10-CM

## 2019-12-18 PROCEDURE — 97110 THERAPEUTIC EXERCISES: CPT | Mod: HCNC,PN

## 2019-12-18 NOTE — PROGRESS NOTES
"  Physical Therapy Progress Note     Name: Papito Bhakta  Clinic Number: [id number]    Therapy Diagnosis:   Encounter Diagnoses   Name Primary?    Chronic left shoulder pain Yes    Decreased range of motion of left shoulder     Decreased strength of upper extremity      Physician: Brynn To MD    Visit Date: 12/18/2019  Physician Orders: PT Eval and Treat   Medical Diagnosis from Referral:   S46.012A (ICD-10-CM) - Rotator cuff strain, left, initial encounter   I50.42 (ICD-10-CM) - Chronic combined systolic and diastolic congestive heart failure     Evaluation Date: 10/30/2019  Authorization Period Expiration: 10/15/2020  Plan of Care Expiration: 1/24/19  Visit # / Visits authorized: 8/ 20 (PN to be performed by 1/04)    Time In: 1:30  Time Out: 2:45pm  Total Time: 30 minutes  Total Billable Time: 30 minutes  Precautions: Standard, CHF and pacemaker    Subjective     Pt reports: He is doing well, slowly improving.  He was compliant with home exercise program.  Response to previous treatment: Decreased pain  Functional change: none to report yet    Pain: 2/10  Location: left shoulder      Objective        Papito received therapeutic exercises to develop strength, endurance, ROM, flexibility, posture and core stabilization for 55 minutes including:    UBE 3'/3'   +Seated Table Slides with inclined mat 2x10  Pulleys Flex/Scaption 3'/3' mins (2 blue foam pads in pt's seat)    Pec Str on 1/2 Foam 3 mins  Supine Wand Flexion on 1/2 Foam 2 x 10  Supine Serratus Punch on 1/2 Foam 2x10  Chest Press on 1/2 Foam 3 x 10  +Horiz Abd c/ YTB on 1/2 Foam 3x10  SL ER 1# 3x10  Prone Row 2# 3x10    Standing:  Wall walks with LUE 2x10  Roll green SB ball up wall 5" hold at top x10 reps  Wall circles (CW/CCW using pillow case) x20 each  Standing Rows with Red TB 3 x 10   +Shoulder Extension with Red TB 3 x 10  Standing wall slides flexion x10 (3" hold)- painful    Papito received the following manual therapy techniques: Joint " mobilizations and PROM were applied to the: L Shoulder for 05 minutes, including  Mid-thoracic P->A Grade I-III    PROM to L shoulder in all planes.  ER at 45* abduction    Papito received hot pack for 10 minutes to L Shoulder.    Home Exercises Provided and Patient Education Provided     Education provided:   Written Home Exercises Provided: Patient instructed to cont prior HEP.  Exercises were reviewed and Papito was able to demonstrate them prior to the end of the session.  Papito demonstrated good  understanding of the education provided.     See EMR under Patient Instructions for exercises provided prior visit.    Assessment     Pt tolerated treatment session very well this session. Able to perform periscapular strengthening on foam roll for emphasis on proper postural algnment. Mid-thoracic joint mobilizations P->A performed for improved joint/soft tissue mobility. Prepare Pt for discharge in upcoming sessions.     Papito is progressing well towards his goals.   Pt prognosis is Good.     Pt will continue to benefit from skilled outpatient physical therapy to address the deficits listed in the problem list box on initial evaluation, provide pt/family education and to maximize pt's level of independence in the home and community environment.     Pt's spiritual, cultural and educational needs considered and pt agreeable to plan of care and goals.     Anticipated barriers to physical therapy: Deconditioning, pacemaker    Goals:  Short Term GOALS: 4 weeks. Pt agrees with goals set. Goals updated 11/20  1. Patient demonstrates independence with HEP. In progress  2. Patient demonstrates independence with Postural Awareness. In progress  3. Patient demonstrates independence with body mechanics.  In progress  4. Patient will report pain of 4/10 at worst, on 0-10 pain scale, with all activity In progress  5. Pt will increase GHJ ROM by 10 degrees in elevation to improve functional reach pain free.  In progress  6. Pt will  increase UE strength by 1/3 muscle grade or greater on MMT to improve tolerance to all functional activities pain free.  In progress    Long Term Goals 8 Weeks. Pt agrees with goals set:  1. Pt will increase GHJ ROM to 115 degrees actively elevation or greater improve functional reach pain free.  In progress  2. Pt will increase UE strength to 4/5 to improve tolerance to all functional activities pain free.  In progress  3. Pt demonstrates improved function per FOTO Shoulder Survey to 30% Limitation or less.  In progress  4. Patient will report pain of 2/10 at worst, on 0-10 pain scale, with all activity In progress  5. Patient demonstrates ability to lift object overhead, proper movement pattern, no pain In progress    Plan     Progress ROM/strengthening activities as tolerated    Piero Baker, PT

## 2020-01-06 DIAGNOSIS — I10 ESSENTIAL HYPERTENSION: ICD-10-CM

## 2020-01-22 ENCOUNTER — OFFICE VISIT (OUTPATIENT)
Dept: CARDIOLOGY | Facility: CLINIC | Age: 65
End: 2020-01-22
Payer: MEDICARE

## 2020-01-22 VITALS
BODY MASS INDEX: 36.94 KG/M2 | RESPIRATION RATE: 16 BRPM | SYSTOLIC BLOOD PRESSURE: 113 MMHG | OXYGEN SATURATION: 96 % | DIASTOLIC BLOOD PRESSURE: 63 MMHG | HEART RATE: 60 BPM | WEIGHT: 311.5 LBS

## 2020-01-22 DIAGNOSIS — I42.8 NICM (NONISCHEMIC CARDIOMYOPATHY): Primary | ICD-10-CM

## 2020-01-22 PROCEDURE — 99214 OFFICE O/P EST MOD 30 MIN: CPT | Mod: 25,HCNC,S$GLB, | Performed by: INTERNAL MEDICINE

## 2020-01-22 PROCEDURE — 3078F PR MOST RECENT DIASTOLIC BLOOD PRESSURE < 80 MM HG: ICD-10-PCS | Mod: HCNC,CPTII,S$GLB, | Performed by: INTERNAL MEDICINE

## 2020-01-22 PROCEDURE — 93289 INTERROG DEVICE EVAL HEART: CPT | Mod: 26,HCNC,, | Performed by: INTERNAL MEDICINE

## 2020-01-22 PROCEDURE — 3078F DIAST BP <80 MM HG: CPT | Mod: HCNC,CPTII,S$GLB, | Performed by: INTERNAL MEDICINE

## 2020-01-22 PROCEDURE — 3008F BODY MASS INDEX DOCD: CPT | Mod: HCNC,CPTII,S$GLB, | Performed by: INTERNAL MEDICINE

## 2020-01-22 PROCEDURE — 3074F SYST BP LT 130 MM HG: CPT | Mod: HCNC,CPTII,S$GLB, | Performed by: INTERNAL MEDICINE

## 2020-01-22 PROCEDURE — 99499 RISK ADDL DX/OHS AUDIT: ICD-10-PCS | Mod: HCNC,S$GLB,, | Performed by: INTERNAL MEDICINE

## 2020-01-22 PROCEDURE — 3008F PR BODY MASS INDEX (BMI) DOCUMENTED: ICD-10-PCS | Mod: HCNC,CPTII,S$GLB, | Performed by: INTERNAL MEDICINE

## 2020-01-22 PROCEDURE — 99499 UNLISTED E&M SERVICE: CPT | Mod: HCNC,S$GLB,, | Performed by: INTERNAL MEDICINE

## 2020-01-22 PROCEDURE — 99214 PR OFFICE/OUTPT VISIT, EST, LEVL IV, 30-39 MIN: ICD-10-PCS | Mod: 25,HCNC,S$GLB, | Performed by: INTERNAL MEDICINE

## 2020-01-22 PROCEDURE — 99999 PR PBB SHADOW E&M-EST. PATIENT-LVL III: CPT | Mod: PBBFAC,HCNC,, | Performed by: INTERNAL MEDICINE

## 2020-01-22 PROCEDURE — 3074F PR MOST RECENT SYSTOLIC BLOOD PRESSURE < 130 MM HG: ICD-10-PCS | Mod: HCNC,CPTII,S$GLB, | Performed by: INTERNAL MEDICINE

## 2020-01-22 PROCEDURE — 93289 PR INTERROG EVAL, IN PERSON,CARDVERT/DEFIB: ICD-10-PCS | Mod: 26,HCNC,, | Performed by: INTERNAL MEDICINE

## 2020-01-22 PROCEDURE — 99999 PR PBB SHADOW E&M-EST. PATIENT-LVL III: ICD-10-PCS | Mod: PBBFAC,HCNC,, | Performed by: INTERNAL MEDICINE

## 2020-01-22 RX ORDER — CARVEDILOL 12.5 MG/1
6.25 TABLET ORAL 2 TIMES DAILY
Qty: 90 TABLET | Refills: 3 | Status: SHIPPED | OUTPATIENT
Start: 2020-01-22 | End: 2020-02-14

## 2020-01-22 RX ORDER — RAMIPRIL 10 MG/1
10 CAPSULE ORAL DAILY
Qty: 90 CAPSULE | Refills: 3 | Status: SHIPPED | OUTPATIENT
Start: 2020-01-22 | End: 2020-07-08

## 2020-01-22 NOTE — PROGRESS NOTES
CARDIOVASCULAR CONSULTATION    REASON FOR CONSULT:   Papito Bhakta is a 64 y.o. male who presents for nonischemic cardiomyopathy.      HISTORY OF PRESENT ILLNESS:     Patient is a 64-year-old man who used to follow with .  He has a history of nonischemic cardiomyopathy status post Bi V AICD implantation.  For nonischemic cardiomyopathy.  His last known ejection fraction is 10%.  Patient states that he has been doing fine.  Denies any chest pains at rest on exertion, orthopnea, PND, shortness of breath at rest or dyspnea on exertion.  States can go up or down 3 to flight flights of stairs without any problem.  Does have chronic pedal edema in his right foot which has been there for many years.  Denies any worsening of pedal edema.       ECHO:  1-19  · Limited study to assess function  · Severely decreased left ventricular systolic function. The estimated ejection fraction is 10%  · Left ventricular diastolic dysfunction.  · Severely reduced right ventricular systolic function.  · No thrombus     Right/left heart catheterization:  2-18  B. Summary/Post-Operative Diagnosis       Normal coronary arteries.    Systolic dysfunction.    Low right and left Filling Pressures.    Mild Pulmonary Hypertension.     Notes from January 2020:  Patient here for follow-up.  Bi V AICD checked today.  Doing fine.  Had 1 episode of 1 sec long nonsustained ventricular tachycardia.  No other episodes of atrial tachycardias noted.  No other ventricular tachycardias noted apart from that 1 episode.  Patient doing fine.  Denies any chest pains at rest on exertion, orthopnea, PND, swelling of feet.              PAST MEDICAL HISTORY:     Past Medical History:   Diagnosis Date    Anticoagulant long-term use     Aspirin    CHF (congestive heart failure)     Hyperlipidemia     Hypertension        PAST SURGICAL HISTORY:     Past Surgical History:   Procedure Laterality Date    INSERTION OF BIVENTRICULAR IMPLANTABLE  CARDIOVERTER-DEFIBRILLATOR (ICD) N/A 2/13/2019    Procedure: INSERTION, ICD, BIVENTRICULAR;  Surgeon: Shailesh Eng MD;  Location: [facility] CATH LAB;  Service: Cardiology;  Laterality: N/A;  RN PRE OP 2-6-19  1ST CASE PER  RADHA. NOTIFIED RADHA THAT ANESTHESIA IS NOT PITO FOR 1ST CASE START-LO    oral extraction  11/2018    TESTICLE SURGERY         ALLERGIES AND MEDICATION:   Review of patient's allergies indicates:  No Known Allergies     Medication List           Accurate as of January 22, 2020  2:04 PM. If you have any questions, ask your nurse or doctor.               CONTINUE taking these medications    aspirin 325 MG tablet     carvedilol 6.25 MG tablet  Commonly known as:  COREG  Take 1 tablet (6.25 mg total) by mouth 2 (two) times daily.     diclofenac sodium 1 % Gel  Commonly known as:  VOLTAREN  Apply 2 g topically 4 (four) times daily.     furosemide 80 MG tablet  Commonly known as:  LASIX  Take 1 tablet (80 mg total) by mouth once daily.     pravastatin 80 MG tablet  Commonly known as:  PRAVACHOL  Take 1 tablet (80 mg total) by mouth once daily.     ramipril 5 MG capsule  Commonly known as:  ALTACE  Take 1 capsule (5 mg total) by mouth once daily.     spironolactone 25 MG tablet  Commonly known as:  ALDACTONE  Take 1 tablet (25 mg total) by mouth once daily.            SOCIAL HISTORY:     Social History     Socioeconomic History    Marital status:      Spouse name: Not on file    Number of children: Not on file    Years of education: Not on file    Highest education level: Not on file   Occupational History    Not on file   Social Needs    Financial resource strain: Not on file    Food insecurity:     Worry: Not on file     Inability: Not on file    Transportation needs:     Medical: Not on file     Non-medical: Not on file   Tobacco Use    Smoking status: Former Smoker     Packs/day: 0.50     Years: 40.00     Pack years: 20.00     Types: Cigarettes, Cigars     Last attempt to quit: 2014      Years since quittin.0    Smokeless tobacco: Never Used   Substance and Sexual Activity    Alcohol use: Yes     Comment: once a month    Drug use: No    Sexual activity: Yes     Birth control/protection: None     Comment: uses protection sometimes   Lifestyle    Physical activity:     Days per week: Not on file     Minutes per session: Not on file    Stress: Not on file   Relationships    Social connections:     Talks on phone: Not on file     Gets together: Not on file     Attends Christian service: Not on file     Active member of club or organization: Not on file     Attends meetings of clubs or organizations: Not on file     Relationship status: Not on file   Other Topics Concern    Not on file   Social History Narrative    Not on file       FAMILY HISTORY:     Family History   Problem Relation Age of Onset    Epilepsy Mother        REVIEW OF SYSTEMS:   Review of Systems   Constitution: Negative.   HENT: Negative.    Eyes: Negative.    Cardiovascular: Positive for leg swelling.   Respiratory: Negative.    Endocrine: Negative.    Hematologic/Lymphatic: Negative.    Skin: Negative.    Musculoskeletal: Negative.    Gastrointestinal: Negative.    Genitourinary: Negative.    Neurological: Negative.    Psychiatric/Behavioral: Negative.    Allergic/Immunologic: Negative.        A 10 point review of systems was performed and all the pertinent positives have been mentioned. Rest of review of systems was negative.        PHYSICAL EXAM:     Vitals:    20 1335   BP: 113/63   Pulse: 60   Resp: 16    Body mass index is 36.94 kg/m².  Weight: (!) 141.3 kg (311 lb 8.2 oz)         Physical Exam   Constitutional: He is oriented to person, place, and time. He appears well-developed and well-nourished.   HENT:   Head: Normocephalic.   Eyes: Pupils are equal, round, and reactive to light. Conjunctivae are normal.   Neck: Normal range of motion. Neck supple.   Cardiovascular: Normal rate, regular rhythm and  normal heart sounds.   Pulmonary/Chest: Effort normal and breath sounds normal.   Abdominal: Soft. Bowel sounds are normal.   Neurological: He is alert and oriented to person, place, and time.   Skin: Skin is warm.   Vitals reviewed.        DATA:     Laboratory:  CBC:  Recent Labs   Lab 01/12/19  0626 01/13/19  0658 02/06/19  1530   WBC 10.45 8.45 5.01   Hemoglobin 11.3 L 11.4 L 11.3 L   Hematocrit 34.6 L 32.5 L 35.3 L   Platelets 320 363 H 181       CHEMISTRIES:  Recent Labs   Lab 01/11/19  0200 01/12/19  0626 01/13/19  0658 02/06/19  1530   Glucose 93 94 96 92   Sodium 141 139 137 141   Potassium 3.5 3.6 3.5 3.6   BUN, Bld 25 H 31 H 32 H 17   Creatinine 1.5 H 1.7 H 1.5 H 1.1   eGFR if  56 A 49 A 56 A >60   eGFR if non  49 A 42 A 49 A >60   Calcium 8.8 9.0 9.2 9.8   Magnesium 2.2 2.3 2.4  --        CARDIAC BIOMARKERS:  Recent Labs   Lab 01/05/19  0837 01/05/19  1432 01/05/19  2113   Troponin I 0.045 H 0.059 H 0.084 H       COAGS:  Recent Labs   Lab 01/30/18  1615 01/05/19  0837 02/06/19  1530   INR 1.0 1.1 1.1       LIPIDS/LFTS:  Recent Labs   Lab 12/02/17  0823 11/05/18  0619 01/05/19  0837   Cholesterol 160  --   --    Triglycerides 106  --   --    HDL 46  --   --    LDL Cholesterol 92.8  --   --    Non-HDL Cholesterol 114  --   --    AST 17 16 28   ALT 11 18 23       No results found for: HGBA1C    TSH        The 10-year ASCVD risk score (Shaila ANN Jr., et al., 2013) is: 12%    Values used to calculate the score:      Age: 64 years      Sex: Male      Is Non- : Yes      Diabetic: No      Tobacco smoker: No      Systolic Blood Pressure: 113 mmHg      Is BP treated: Yes      HDL Cholesterol: 46 mg/dL      Total Cholesterol: 160 mg/dL           Cardiovascular Testing:    Personally reviewed as above      ASSESSMENT AND PLAN     Patient Active Problem List   Diagnosis    Other hyperlipidemia    Essential hypertension    Chronic combined systolic and diastolic  congestive heart failure    BMI 40.0-44.9, adult    Hyperlipidemia    Severe obesity (BMI 35.0-39.9) with comorbidity    Biventricular ICD (implantable cardioverter-defibrillator) in place    Chronic left shoulder pain    Decreased range of motion of left shoulder    Decreased strength of upper extremity    Congestive heart failure         1.  Nonischemic cardiomyopathy with ejection fraction of 10%.  Patient on Coreg and ramipril and spironolactone.  I will increase the dose of Coreg to 12.5 mg b.i.d. as well as the dose of ramipril to 10 mg daily with the goal to titrate up to the maximally tolerated dose.  Patient is currently euvolemic on current dose of Lasix.  Continue current therapy.    2.  AICD:  Patient status post Bi V AICD placement.  We will to ICD check at the next clinic visit.    3.  Continue aggressive risk factor modification low-salt diet has been recommended.        Thank you very much for involving me in the care of your patient.  Please do not hesitate to contact me if there are any questions.      Makayla Long MD, FACC, [facility]  Interventional Cardiologist, [facility].           This note was dictated with the help of speech recognition software.  There might be un-intended errors and/or substitutions.

## 2020-01-29 ENCOUNTER — PES CALL (OUTPATIENT)
Dept: ADMINISTRATIVE | Facility: CLINIC | Age: 65
End: 2020-01-29

## 2020-02-12 ENCOUNTER — OFFICE VISIT (OUTPATIENT)
Dept: FAMILY MEDICINE | Facility: CLINIC | Age: 65
End: 2020-02-12
Payer: MEDICARE

## 2020-02-12 VITALS
OXYGEN SATURATION: 95 % | TEMPERATURE: 98 F | SYSTOLIC BLOOD PRESSURE: 122 MMHG | HEART RATE: 60 BPM | BODY MASS INDEX: 37.19 KG/M2 | DIASTOLIC BLOOD PRESSURE: 80 MMHG | WEIGHT: 315 LBS | HEIGHT: 77 IN

## 2020-02-12 DIAGNOSIS — Z95.810 BIVENTRICULAR ICD (IMPLANTABLE CARDIOVERTER-DEFIBRILLATOR) IN PLACE: ICD-10-CM

## 2020-02-12 DIAGNOSIS — E66.01 SEVERE OBESITY (BMI 35.0-39.9) WITH COMORBIDITY: ICD-10-CM

## 2020-02-12 DIAGNOSIS — I50.42 CHRONIC COMBINED SYSTOLIC AND DIASTOLIC CONGESTIVE HEART FAILURE: ICD-10-CM

## 2020-02-12 DIAGNOSIS — Z00.00 ENCOUNTER FOR PREVENTIVE HEALTH EXAMINATION: Primary | ICD-10-CM

## 2020-02-12 DIAGNOSIS — E78.5 HYPERLIPIDEMIA, UNSPECIFIED HYPERLIPIDEMIA TYPE: Chronic | ICD-10-CM

## 2020-02-12 DIAGNOSIS — I10 ESSENTIAL HYPERTENSION: Chronic | ICD-10-CM

## 2020-02-12 PROCEDURE — 3074F SYST BP LT 130 MM HG: CPT | Mod: HCNC,CPTII,S$GLB, | Performed by: NURSE PRACTITIONER

## 2020-02-12 PROCEDURE — G0439 PR MEDICARE ANNUAL WELLNESS SUBSEQUENT VISIT: ICD-10-PCS | Mod: HCNC,S$GLB,, | Performed by: NURSE PRACTITIONER

## 2020-02-12 PROCEDURE — 3074F PR MOST RECENT SYSTOLIC BLOOD PRESSURE < 130 MM HG: ICD-10-PCS | Mod: HCNC,CPTII,S$GLB, | Performed by: NURSE PRACTITIONER

## 2020-02-12 PROCEDURE — 99999 PR PBB SHADOW E&M-EST. PATIENT-LVL IV: CPT | Mod: PBBFAC,HCNC,, | Performed by: NURSE PRACTITIONER

## 2020-02-12 PROCEDURE — G0439 PPPS, SUBSEQ VISIT: HCPCS | Mod: HCNC,S$GLB,, | Performed by: NURSE PRACTITIONER

## 2020-02-12 PROCEDURE — 3079F DIAST BP 80-89 MM HG: CPT | Mod: HCNC,CPTII,S$GLB, | Performed by: NURSE PRACTITIONER

## 2020-02-12 PROCEDURE — 3079F PR MOST RECENT DIASTOLIC BLOOD PRESSURE 80-89 MM HG: ICD-10-PCS | Mod: HCNC,CPTII,S$GLB, | Performed by: NURSE PRACTITIONER

## 2020-02-12 PROCEDURE — 99999 PR PBB SHADOW E&M-EST. PATIENT-LVL IV: ICD-10-PCS | Mod: PBBFAC,HCNC,, | Performed by: NURSE PRACTITIONER

## 2020-02-12 NOTE — PROGRESS NOTES
"Papito Bhakta presented for a  Medicare AWV and comprehensive Health Risk Assessment today. The following components were reviewed and updated:    · Medical history  · Family History  · Social history  · Allergies and Current Medications  · Health Risk Assessment  · Health Maintenance  · Care Team     ** See Completed Assessments for Annual Wellness Visit within the encounter summary.**       The following assessments were completed:  · Living Situation  · CAGE  · Depression Screening  · Timed Get Up and Go  · Whisper Test  · Cognitive Function Screening  ·   ·   · Nutrition Screening  · ADL Screening  · PAQ Screening    Vitals:    02/12/20 1340   BP: 122/80   BP Location: Left arm   Patient Position: Sitting   BP Method: Large (Manual)   Pulse: 60   Temp: 97.7 °F (36.5 °C)   TempSrc: Oral   SpO2: 95%   Weight: (!) 143.8 kg (317 lb 0.3 oz)   Height: 6' 5" (1.956 m)     Body mass index is 37.59 kg/m².  Physical Exam   Constitutional: He is oriented to person, place, and time.   Cardiovascular: Normal rate, regular rhythm and normal heart sounds.   Pulmonary/Chest: Effort normal and breath sounds normal.   Musculoskeletal: He exhibits edema (bilateral +2 pretibial edema ).   Neurological: He is alert and oriented to person, place, and time.   Skin: Skin is warm. Capillary refill takes less than 2 seconds.   Psychiatric: He has a normal mood and affect. His behavior is normal. Thought content normal.   Vitals reviewed.        Diagnoses and health risks identified today and associated recommendations/orders:    1. Encounter for preventive health examination  Education provided about preventive health examinations and procedures; addressed and discussed patient's health concerns. Additionally, reviewed medical record for risk factors and documented the results during this encounter.    2. Chronic combined systolic and diastolic congestive heart failure  Stable and monitored. Continue current treatment plan as previously " prescribed.   We discussed fluid intake and monitoring, sodium restrictive diet.  Patient verbalized an understanding.     3. Severe obesity (BMI 35.0-39.9) with comorbidity  Patient engaged in structured fitness activities; advised to follow up with cardiology to discuss tolerance and endurance activities.     4. Essential hypertension  Presently at goal. Continue as advised regarding dietary and lifestyle modifications.     5. Hyperlipidemia, unspecified hyperlipidemia type  Stable and monitored. Continue current treatment plan as previously prescribed.     6. Biventricular ICD (implantable cardioverter-defibrillator) in place  Stable, patient evaluated/monitored by [facility] cardiology dept; continue as advised.     Reviewed health maintenance, educated about recommended examinations, procedures (labs & images), and immunizations. Deferred shingles vaccine at today's visit.  Patient advised of HIV screening.     Provided Papito with a 5-10 year written screening schedule and personal prevention plan. Recommendations were developed using the USPSTF age appropriate recommendations. Education, counseling, and referrals were provided as needed. After Visit Summary printed and given to patient which includes a list of additional screenings\tests needed.    Follow up in about 1 year (around 2/12/2021) for assessment .    Elliot Guzman Jr, NP   I offered to discuss end of life issues, including information on how to make advance directives that the patient could use to name someone who would make medical decisions on their behalf if they became too ill to make themselves.    ___Patient declined  _X_Patient is interested, I provided paper work and offered to discuss.

## 2020-02-12 NOTE — PATIENT INSTRUCTIONS
Counseling and Referral of Other Preventative  (Italic type indicates deductible and co-insurance are waived)    Patient Name: Papito Bhakta  Today's Date: 2/12/2020    Health Maintenance       Date Due Completion Date    HIV Screening 05/05/1970 Patient aware of recommendation for HIV screening.     Shingles Vaccine (1 of 2) 05/05/2005 Patient aware of recommendation for shingles vaccine.     Lipid Panel 12/02/2022 12/2/2017    Colonoscopy 10/10/2026 10/10/2016 (Done)    Override on 10/10/2016: Done    TETANUS VACCINE 06/06/2028 6/6/2018        No orders of the defined types were placed in this encounter.    The following information is provided to all patients.  This information is to help you find resources for any of the problems found today that may be affecting your health:                Living healthy guide: www.Count includes the [facility].[place].gov      Understanding Diabetes: www.diabetes.org      Eating healthy: www.cdc.gov/healthyweight      CDC home safety checklist: www.cdc.gov/steadi/patient.html      Agency on Aging: www.goea.[place].gov      Alcoholics anonymous (AA): www.aa.org      Physical Activity: www.keron.nih.gov/ls6ypdh      Tobacco use: www.quitwithusla.org

## 2020-02-14 ENCOUNTER — OFFICE VISIT (OUTPATIENT)
Dept: CARDIOLOGY | Facility: CLINIC | Age: 65
End: 2020-02-14
Payer: MEDICARE

## 2020-02-14 VITALS
RESPIRATION RATE: 16 BRPM | WEIGHT: 315 LBS | OXYGEN SATURATION: 94 % | SYSTOLIC BLOOD PRESSURE: 134 MMHG | HEART RATE: 67 BPM | DIASTOLIC BLOOD PRESSURE: 83 MMHG | BODY MASS INDEX: 37.38 KG/M2

## 2020-02-14 DIAGNOSIS — I50.9 CONGESTIVE HEART FAILURE, UNSPECIFIED HF CHRONICITY, UNSPECIFIED HEART FAILURE TYPE: Primary | ICD-10-CM

## 2020-02-14 PROCEDURE — 3008F BODY MASS INDEX DOCD: CPT | Mod: HCNC,CPTII,S$GLB, | Performed by: INTERNAL MEDICINE

## 2020-02-14 PROCEDURE — 99214 OFFICE O/P EST MOD 30 MIN: CPT | Mod: HCNC,S$GLB,, | Performed by: INTERNAL MEDICINE

## 2020-02-14 PROCEDURE — 99999 PR PBB SHADOW E&M-EST. PATIENT-LVL III: CPT | Mod: PBBFAC,HCNC,, | Performed by: INTERNAL MEDICINE

## 2020-02-14 PROCEDURE — 3079F DIAST BP 80-89 MM HG: CPT | Mod: HCNC,CPTII,S$GLB, | Performed by: INTERNAL MEDICINE

## 2020-02-14 PROCEDURE — 3075F SYST BP GE 130 - 139MM HG: CPT | Mod: HCNC,CPTII,S$GLB, | Performed by: INTERNAL MEDICINE

## 2020-02-14 PROCEDURE — 99214 PR OFFICE/OUTPT VISIT, EST, LEVL IV, 30-39 MIN: ICD-10-PCS | Mod: HCNC,S$GLB,, | Performed by: INTERNAL MEDICINE

## 2020-02-14 PROCEDURE — 99499 UNLISTED E&M SERVICE: CPT | Mod: HCNC,S$GLB,, | Performed by: INTERNAL MEDICINE

## 2020-02-14 PROCEDURE — 3079F PR MOST RECENT DIASTOLIC BLOOD PRESSURE 80-89 MM HG: ICD-10-PCS | Mod: HCNC,CPTII,S$GLB, | Performed by: INTERNAL MEDICINE

## 2020-02-14 PROCEDURE — 99999 PR PBB SHADOW E&M-EST. PATIENT-LVL III: ICD-10-PCS | Mod: PBBFAC,HCNC,, | Performed by: INTERNAL MEDICINE

## 2020-02-14 PROCEDURE — 99499 RISK ADDL DX/OHS AUDIT: ICD-10-PCS | Mod: HCNC,S$GLB,, | Performed by: INTERNAL MEDICINE

## 2020-02-14 PROCEDURE — 3008F PR BODY MASS INDEX (BMI) DOCUMENTED: ICD-10-PCS | Mod: HCNC,CPTII,S$GLB, | Performed by: INTERNAL MEDICINE

## 2020-02-14 PROCEDURE — 3075F PR MOST RECENT SYSTOLIC BLOOD PRESS GE 130-139MM HG: ICD-10-PCS | Mod: HCNC,CPTII,S$GLB, | Performed by: INTERNAL MEDICINE

## 2020-02-14 RX ORDER — CARVEDILOL 25 MG/1
25 TABLET ORAL 2 TIMES DAILY
Qty: 180 TABLET | Refills: 3 | Status: ON HOLD | OUTPATIENT
Start: 2020-02-14 | End: 2020-06-19 | Stop reason: HOSPADM

## 2020-02-14 NOTE — PROGRESS NOTES
CARDIOVASCULAR CONSULTATION    REASON FOR CONSULT:   Papito Bhakta is a 64 y.o. male who presents for nonischemic cardiomyopathy.      HISTORY OF PRESENT ILLNESS:     Patient is a 64-year-old man who used to follow with .  He has a history of nonischemic cardiomyopathy status post Bi V AICD implantation.  For nonischemic cardiomyopathy.  His last known ejection fraction is 10%.  Patient states that he has been doing fine.  Denies any chest pains at rest on exertion, orthopnea, PND, shortness of breath at rest or dyspnea on exertion.  States can go up or down 3 to flight flights of stairs without any problem.  Does have chronic pedal edema in his right foot which has been there for many years.  Denies any worsening of pedal edema.       ECHO:  1-19  · Limited study to assess function  · Severely decreased left ventricular systolic function. The estimated ejection fraction is 10%  · Left ventricular diastolic dysfunction.  · Severely reduced right ventricular systolic function.  · No thrombus     Right/left heart catheterization:  2-18  B. Summary/Post-Operative Diagnosis       Normal coronary arteries.    Systolic dysfunction.    Low right and left Filling Pressures.    Mild Pulmonary Hypertension.     Notes from January 2020:  Patient here for follow-up.  Bi V AICD checked today.  Doing fine.  Had 1 episode of 1 sec long nonsustained ventricular tachycardia.  No other episodes of atrial tachycardias noted.  No other ventricular tachycardias noted apart from that 1 episode.  Patient doing fine.  Denies any chest pains at rest on exertion, orthopnea, PND, swelling of feet.      Notes from February 2020:  Patient here for follow-up.  Denies any chest pains at rest on exertion, orthopnea, PND, swelling of feet.  Tolerating the higher dose of Coreg well.        PAST MEDICAL HISTORY:     Past Medical History:   Diagnosis Date    Anticoagulant long-term use     Aspirin    CHF (congestive heart failure)      Hyperlipidemia     Hypertension     Obesity        PAST SURGICAL HISTORY:     Past Surgical History:   Procedure Laterality Date    INSERTION OF BIVENTRICULAR IMPLANTABLE CARDIOVERTER-DEFIBRILLATOR (ICD) N/A 2/13/2019    Procedure: INSERTION, ICD, BIVENTRICULAR;  Surgeon: Shailesh Eng MD;  Location: [facility] CATH LAB;  Service: Cardiology;  Laterality: N/A;  RN PRE OP 2-6-19  1ST CASE PER  RADHA. NOTIFIED RADHA THAT ANESTHESIA IS NOT PITO FOR 1ST CASE START-LO    oral extraction  11/2018    TESTICLE SURGERY         ALLERGIES AND MEDICATION:   Review of patient's allergies indicates:  No Known Allergies     Medication List           Accurate as of February 14, 2020  3:34 PM. If you have any questions, ask your nurse or doctor.               CHANGE how you take these medications    carvediloL 25 MG tablet  Commonly known as:  COREG  Take 1 tablet (25 mg total) by mouth 2 (two) times daily.  What changed:    · medication strength  · how much to take  Changed by:  Makayla Long MD        CONTINUE taking these medications    aspirin 325 MG tablet     diclofenac sodium 1 % Gel  Commonly known as:  VOLTAREN  Apply 2 g topically 4 (four) times daily.     furosemide 80 MG tablet  Commonly known as:  LASIX  Take 1 tablet (80 mg total) by mouth once daily.     pravastatin 80 MG tablet  Commonly known as:  PRAVACHOL  Take 1 tablet (80 mg total) by mouth once daily.     ramipril 10 MG capsule  Commonly known as:  ALTACE  Take 1 capsule (10 mg total) by mouth once daily.     spironolactone 25 MG tablet  Commonly known as:  ALDACTONE  Take 1 tablet (25 mg total) by mouth once daily.           Where to Get Your Medications      These medications were sent to [facility] - [facility] [address]   [address], [facility] [address]    Phone:  [phone]   · carvediloL 25 MG tablet         SOCIAL HISTORY:     Social History     Socioeconomic History    Marital status:      Spouse  name: Not on file    Number of children: Not on file    Years of education: Not on file    Highest education level: Not on file   Occupational History    Not on file   Social Needs    Financial resource strain: Not on file    Food insecurity:     Worry: Not on file     Inability: Not on file    Transportation needs:     Medical: Not on file     Non-medical: Not on file   Tobacco Use    Smoking status: Former Smoker     Packs/day: 0.50     Years: 40.00     Pack years: 20.00     Types: Cigarettes, Cigars     Last attempt to quit:      Years since quittin.1    Smokeless tobacco: Never Used   Substance and Sexual Activity    Alcohol use: Yes     Comment: once a month    Drug use: No    Sexual activity: Yes     Birth control/protection: None     Comment: uses protection sometimes   Lifestyle    Physical activity:     Days per week: Not on file     Minutes per session: Not on file    Stress: Not on file   Relationships    Social connections:     Talks on phone: Not on file     Gets together: Not on file     Attends U[employer] service: Not on file     Active member of club or organization: Not on file     Attends meetings of clubs or organizations: Not on file     Relationship status: Not on file   Other Topics Concern    Not on file   Social History Narrative    Not on file       FAMILY HISTORY:     Family History   Problem Relation Age of Onset    Epilepsy Mother        REVIEW OF SYSTEMS:   Review of Systems   Constitution: Negative.   HENT: Negative.    Eyes: Negative.    Cardiovascular: Positive for leg swelling.   Respiratory: Negative.    Endocrine: Negative.    Hematologic/Lymphatic: Negative.    Skin: Negative.    Musculoskeletal: Negative.    Gastrointestinal: Negative.    Genitourinary: Negative.    Neurological: Negative.    Psychiatric/Behavioral: Negative.    Allergic/Immunologic: Negative.        A 10 point review of systems was performed and all the pertinent positives have been  mentioned. Rest of review of systems was negative.        PHYSICAL EXAM:     Vitals:    02/14/20 1438   BP: 134/83   Pulse: 67   Resp: 16    Body mass index is 37.38 kg/m².  Weight: (!) 143 kg (315 lb 4.1 oz)         Physical Exam   Constitutional: He is oriented to person, place, and time. He appears well-developed and well-nourished.   HENT:   Head: Normocephalic.   Eyes: Pupils are equal, round, and reactive to light. Conjunctivae are normal.   Neck: Normal range of motion. Neck supple.   Cardiovascular: Normal rate, regular rhythm and normal heart sounds.   Pulmonary/Chest: Effort normal and breath sounds normal.   Abdominal: Soft. Bowel sounds are normal.   Neurological: He is alert and oriented to person, place, and time.   Skin: Skin is warm.   Vitals reviewed.        DATA:     Laboratory:  CBC:  Recent Labs   Lab 01/12/19  0626 01/13/19  0658 02/06/19  1530   WBC 10.45 8.45 5.01   Hemoglobin 11.3 L 11.4 L 11.3 L   Hematocrit 34.6 L 32.5 L 35.3 L   Platelets 320 363 H 181       CHEMISTRIES:  Recent Labs   Lab 01/11/19  0200 01/12/19  0626 01/13/19  0658 02/06/19  1530   Glucose 93 94 96 92   Sodium 141 139 137 141   Potassium 3.5 3.6 3.5 3.6   BUN, Bld 25 H 31 H 32 H 17   Creatinine 1.5 H 1.7 H 1.5 H 1.1   eGFR if  56 A 49 A 56 A >60   eGFR if non  49 A 42 A 49 A >60   Calcium 8.8 9.0 9.2 9.8   Magnesium 2.2 2.3 2.4  --        CARDIAC BIOMARKERS:  Recent Labs   Lab 01/05/19  0837 01/05/19  1432 01/05/19  2113   Troponin I 0.045 H 0.059 H 0.084 H       COAGS:  Recent Labs   Lab 01/30/18  1615 01/05/19  0837 02/06/19  1530   INR 1.0 1.1 1.1       LIPIDS/LFTS:  Recent Labs   Lab 12/02/17  0823 11/05/18  0619 01/05/19  0837   Cholesterol 160  --   --    Triglycerides 106  --   --    HDL 46  --   --    LDL Cholesterol 92.8  --   --    Non-HDL Cholesterol 114  --   --    AST 17 16 28   ALT 11 18 23       No results found for: HGBA1C    TSH        The 10-year ASCVD risk score (Shaila DC  , et al., 2013) is: 16.2%    Values used to calculate the score:      Age: 64 years      Sex: Male      Is Non- : Yes      Diabetic: No      Tobacco smoker: No      Systolic Blood Pressure: 134 mmHg      Is BP treated: Yes      HDL Cholesterol: 46 mg/dL      Total Cholesterol: 160 mg/dL           Cardiovascular Testing:    Personally reviewed as above      ASSESSMENT AND PLAN     Patient Active Problem List   Diagnosis    Other hyperlipidemia    Essential hypertension    Chronic combined systolic and diastolic congestive heart failure    Hyperlipidemia    Severe obesity (BMI 35.0-39.9) with comorbidity    Biventricular ICD (implantable cardioverter-defibrillator) in place    Chronic left shoulder pain    Decreased range of motion of left shoulder    Decreased strength of upper extremity    Congestive heart failure         1.  Nonischemic cardiomyopathy with ejection fraction of 10%.  Patient on Coreg and ramipril and spironolactone.  I will increase the dose of Coreg to 25 mg b.i.d. as well as the dose of ramipril to 10 mg daily with the goal to titrate up to the maximally tolerated dose.  Continue spironolactone.  Patient is currently euvolemic on current dose of Lasix.  Continue current therapy.    2.  AICD:  Patient status post Bi V AICD placement.  We will to ICD check in 6 months  3.  Continue aggressive risk factor modification low-salt diet has been recommended.        Thank you very much for involving me in the care of your patient.  Please do not hesitate to contact me if there are any questions.      Makayla Long MD, FACC, [facility]  Interventional Cardiologist, [facility].       Follow-up in 2 months with echocardiogram prior.    This note was dictated with the help of speech recognition software.  There might be un-intended errors and/or substitutions.

## 2020-02-14 NOTE — PROGRESS NOTES
CARDIOVASCULAR CONSULTATION    REASON FOR CONSULT:   Papito Bhakta is a 64 y.o. male who presents for nonischemic cardiomyopathy.      HISTORY OF PRESENT ILLNESS:     Patient is a 64-year-old man who used to follow with .  He has a history of nonischemic cardiomyopathy status post Bi V AICD implantation.  For nonischemic cardiomyopathy.  His last known ejection fraction is 10%.  Patient states that he has been doing fine.  Denies any chest pains at rest on exertion, orthopnea, PND, shortness of breath at rest or dyspnea on exertion.  States can go up or down 3 to flight flights of stairs without any problem.  Does have chronic pedal edema in his right foot which has been there for many years.  Denies any worsening of pedal edema.       ECHO:  1-19  · Limited study to assess function  · Severely decreased left ventricular systolic function. The estimated ejection fraction is 10%  · Left ventricular diastolic dysfunction.  · Severely reduced right ventricular systolic function.  · No thrombus     Right/left heart catheterization:  2-18  B. Summary/Post-Operative Diagnosis       Normal coronary arteries.    Systolic dysfunction.    Low right and left Filling Pressures.    Mild Pulmonary Hypertension.     Notes from January 2020:  Patient here for follow-up.  Bi V AICD checked today.  Doing fine.  Had 1 episode of 1 sec long nonsustained ventricular tachycardia.  No other episodes of atrial tachycardias noted.  No other ventricular tachycardias noted apart from that 1 episode.  Patient doing fine.  Denies any chest pains at rest on exertion, orthopnea, PND, swelling of feet.              PAST MEDICAL HISTORY:     Past Medical History:   Diagnosis Date    Anticoagulant long-term use     Aspirin    CHF (congestive heart failure)     Hyperlipidemia     Hypertension     Obesity        PAST SURGICAL HISTORY:     Past Surgical History:   Procedure Laterality Date    INSERTION OF BIVENTRICULAR IMPLANTABLE  CARDIOVERTER-DEFIBRILLATOR (ICD) N/A 2/13/2019    Procedure: INSERTION, ICD, BIVENTRICULAR;  Surgeon: Shailesh Eng MD;  Location: [facility] CATH LAB;  Service: Cardiology;  Laterality: N/A;  RN PRE OP 2-6-19  1ST CASE PER  RADHA. NOTIFIED RADHA THAT ANESTHESIA IS NOT PITO FOR 1ST CASE START-LO    oral extraction  11/2018    TESTICLE SURGERY         ALLERGIES AND MEDICATION:   Review of patient's allergies indicates:  No Known Allergies     Medication List           Accurate as of February 14, 2020  3:26 PM. If you have any questions, ask your nurse or doctor.               CONTINUE taking these medications    aspirin 325 MG tablet     carvediloL 12.5 MG tablet  Commonly known as:  COREG  Take 0.5 tablets (6.25 mg total) by mouth 2 (two) times daily.     diclofenac sodium 1 % Gel  Commonly known as:  VOLTAREN  Apply 2 g topically 4 (four) times daily.     furosemide 80 MG tablet  Commonly known as:  LASIX  Take 1 tablet (80 mg total) by mouth once daily.     pravastatin 80 MG tablet  Commonly known as:  PRAVACHOL  Take 1 tablet (80 mg total) by mouth once daily.     ramipril 10 MG capsule  Commonly known as:  ALTACE  Take 1 capsule (10 mg total) by mouth once daily.     spironolactone 25 MG tablet  Commonly known as:  ALDACTONE  Take 1 tablet (25 mg total) by mouth once daily.            SOCIAL HISTORY:     Social History     Socioeconomic History    Marital status:      Spouse name: Not on file    Number of children: Not on file    Years of education: Not on file    Highest education level: Not on file   Occupational History    Not on file   Social Needs    Financial resource strain: Not on file    Food insecurity:     Worry: Not on file     Inability: Not on file    Transportation needs:     Medical: Not on file     Non-medical: Not on file   Tobacco Use    Smoking status: Former Smoker     Packs/day: 0.50     Years: 40.00     Pack years: 20.00     Types: Cigarettes, Cigars     Last attempt to  quit: 2014     Years since quittin.1    Smokeless tobacco: Never Used   Substance and Sexual Activity    Alcohol use: Yes     Comment: once a month    Drug use: No    Sexual activity: Yes     Birth control/protection: None     Comment: uses protection sometimes   Lifestyle    Physical activity:     Days per week: Not on file     Minutes per session: Not on file    Stress: Not on file   Relationships    Social connections:     Talks on phone: Not on file     Gets together: Not on file     Attends [employer] service: Not on file     Active member of club or organization: Not on file     Attends meetings of clubs or organizations: Not on file     Relationship status: Not on file   Other Topics Concern    Not on file   Social History Narrative    Not on file       FAMILY HISTORY:     Family History   Problem Relation Age of Onset    Epilepsy Mother        REVIEW OF SYSTEMS:   Review of Systems   Constitution: Negative.   HENT: Negative.    Eyes: Negative.    Cardiovascular: Positive for leg swelling.   Respiratory: Negative.    Endocrine: Negative.    Hematologic/Lymphatic: Negative.    Skin: Negative.    Musculoskeletal: Negative.    Gastrointestinal: Negative.    Genitourinary: Negative.    Neurological: Negative.    Psychiatric/Behavioral: Negative.    Allergic/Immunologic: Negative.        A 10 point review of systems was performed and all the pertinent positives have been mentioned. Rest of review of systems was negative.        PHYSICAL EXAM:     Vitals:    20 1438   BP: 134/83   Pulse: 67   Resp: 16    Body mass index is 37.38 kg/m².  Weight: (!) 143 kg (315 lb 4.1 oz)         Physical Exam   Constitutional: He is oriented to person, place, and time. He appears well-developed and well-nourished.   HENT:   Head: Normocephalic.   Eyes: Pupils are equal, round, and reactive to light. Conjunctivae are normal.   Neck: Normal range of motion. Neck supple.   Cardiovascular: Normal rate, regular rhythm  and normal heart sounds.   Pulmonary/Chest: Effort normal and breath sounds normal.   Abdominal: Soft. Bowel sounds are normal.   Neurological: He is alert and oriented to person, place, and time.   Skin: Skin is warm.   Vitals reviewed.        DATA:     Laboratory:  CBC:  Recent Labs   Lab 01/12/19  0626 01/13/19  0658 02/06/19  1530   WBC 10.45 8.45 5.01   Hemoglobin 11.3 L 11.4 L 11.3 L   Hematocrit 34.6 L 32.5 L 35.3 L   Platelets 320 363 H 181       CHEMISTRIES:  Recent Labs   Lab 01/11/19  0200 01/12/19  0626 01/13/19  0658 02/06/19  1530   Glucose 93 94 96 92   Sodium 141 139 137 141   Potassium 3.5 3.6 3.5 3.6   BUN, Bld 25 H 31 H 32 H 17   Creatinine 1.5 H 1.7 H 1.5 H 1.1   eGFR if  56 A 49 A 56 A >60   eGFR if non  49 A 42 A 49 A >60   Calcium 8.8 9.0 9.2 9.8   Magnesium 2.2 2.3 2.4  --        CARDIAC BIOMARKERS:  Recent Labs   Lab 01/05/19  0837 01/05/19  1432 01/05/19  2113   Troponin I 0.045 H 0.059 H 0.084 H       COAGS:  Recent Labs   Lab 01/30/18  1615 01/05/19  0837 02/06/19  1530   INR 1.0 1.1 1.1       LIPIDS/LFTS:  Recent Labs   Lab 12/02/17  0823 11/05/18  0619 01/05/19  0837   Cholesterol 160  --   --    Triglycerides 106  --   --    HDL 46  --   --    LDL Cholesterol 92.8  --   --    Non-HDL Cholesterol 114  --   --    AST 17 16 28   ALT 11 18 23       No results found for: HGBA1C    TSH        The 10-year ASCVD risk score (Shaila ANN Jr., et al., 2013) is: 16.2%    Values used to calculate the score:      Age: 64 years      Sex: Male      Is Non- : Yes      Diabetic: No      Tobacco smoker: No      Systolic Blood Pressure: 134 mmHg      Is BP treated: Yes      HDL Cholesterol: 46 mg/dL      Total Cholesterol: 160 mg/dL           Cardiovascular Testing:    Personally reviewed as above      ASSESSMENT AND PLAN     Patient Active Problem List   Diagnosis    Other hyperlipidemia    Essential hypertension    Chronic combined systolic and  diastolic congestive heart failure    Hyperlipidemia    Severe obesity (BMI 35.0-39.9) with comorbidity    Biventricular ICD (implantable cardioverter-defibrillator) in place    Chronic left shoulder pain    Decreased range of motion of left shoulder    Decreased strength of upper extremity    Congestive heart failure         1.  Nonischemic cardiomyopathy with ejection fraction of 10%.  Patient on Coreg and ramipril and spironolactone.  I will increase the dose of Coreg to 12.5 mg b.i.d. as well as the dose of ramipril to 10 mg daily with the goal to titrate up to the maximally tolerated dose.  Patient is currently euvolemic on current dose of Lasix.  Continue current therapy.    2.  AICD:  Patient status post Bi V AICD placement.  We will to ICD check at the next clinic visit.    3.  Continue aggressive risk factor modification low-salt diet has been recommended.        Thank you very much for involving me in the care of your patient.  Please do not hesitate to contact me if there are any questions.      Makayla Long MD, FACC, [facility]  Interventional Cardiologist, [facility].           This note was dictated with the help of speech recognition software.  There might be un-intended errors and/or substitutions.

## 2020-03-03 DIAGNOSIS — I50.42 CHRONIC COMBINED SYSTOLIC AND DIASTOLIC CONGESTIVE HEART FAILURE: ICD-10-CM

## 2020-03-03 RX ORDER — FUROSEMIDE 80 MG/1
80 TABLET ORAL DAILY
Qty: 90 TABLET | Refills: 3 | Status: SHIPPED | OUTPATIENT
Start: 2020-03-03 | End: 2020-07-07 | Stop reason: SDUPTHER

## 2020-03-03 NOTE — TELEPHONE ENCOUNTER
----- Message from Ximena Gooden sent at 3/3/2020  8:58 AM CST -----  Contact: Self  Type: Patient Call Back    Who called: Self    What is the request in detail: Patient is requesting refill on fluid medication. Please advise.      [facility] - [place], [place] - [address]  [address] [address]  Phone: [phone] Fax: [phone]      Can the clinic reply by [facility]? No    Would the patient rather a call back or a response via [facility]? Call    Best call back number: [phone]    Additional Information:n/a

## 2020-06-15 ENCOUNTER — HOSPITAL ENCOUNTER (INPATIENT)
Facility: HOSPITAL | Age: 65
LOS: 5 days | Discharge: HOME HEALTH | DRG: 261 | End: 2020-06-20
Attending: EMERGENCY MEDICINE | Admitting: HOSPITALIST
Payer: MEDICARE

## 2020-06-15 DIAGNOSIS — T82.9XXA PACEMAKER COMPLICATIONS, INITIAL ENCOUNTER: ICD-10-CM

## 2020-06-15 DIAGNOSIS — I42.8 NICM (NONISCHEMIC CARDIOMYOPATHY): ICD-10-CM

## 2020-06-15 DIAGNOSIS — T82.7XXD INFECTION OF PACEMAKER POCKET, SUBSEQUENT ENCOUNTER: ICD-10-CM

## 2020-06-15 DIAGNOSIS — T82.7XXA INFECTION OF PACEMAKER POCKET: ICD-10-CM

## 2020-06-15 DIAGNOSIS — T82.7XXD: ICD-10-CM

## 2020-06-15 DIAGNOSIS — T82.897S EROSION OF PACEMAKER POCKET DUE TO AND NOT CONCURRENT WITH IMPLANTATION OF CARDIAC PACEMAKER: Primary | ICD-10-CM

## 2020-06-15 DIAGNOSIS — T82.7XXA INFECTION OF IMPLANTABLE CARDIOVERTER-DEFIBRILLATOR (ICD) LEAD: ICD-10-CM

## 2020-06-15 DIAGNOSIS — I50.42 CHRONIC COMBINED SYSTOLIC AND DIASTOLIC CONGESTIVE HEART FAILURE: ICD-10-CM

## 2020-06-15 DIAGNOSIS — M19.90 OSTEOARTHRITIS, UNSPECIFIED OSTEOARTHRITIS TYPE, UNSPECIFIED SITE: ICD-10-CM

## 2020-06-15 LAB — SARS-COV-2 RDRP RESP QL NAA+PROBE: NEGATIVE

## 2020-06-15 PROCEDURE — 99223 1ST HOSP IP/OBS HIGH 75: CPT | Mod: HCNC,AI,, | Performed by: NURSE PRACTITIONER

## 2020-06-15 PROCEDURE — U0002 COVID-19 LAB TEST NON-CDC: HCPCS | Mod: HCNC

## 2020-06-15 PROCEDURE — 25000003 PHARM REV CODE 250: Mod: HCNC | Performed by: EMERGENCY MEDICINE

## 2020-06-15 PROCEDURE — 20600001 HC STEP DOWN PRIVATE ROOM: Mod: HCNC

## 2020-06-15 PROCEDURE — 99223 PR INITIAL HOSPITAL CARE,LEVL III: ICD-10-PCS | Mod: HCNC,AI,, | Performed by: NURSE PRACTITIONER

## 2020-06-15 PROCEDURE — 99285 EMERGENCY DEPT VISIT HI MDM: CPT | Mod: HCNC

## 2020-06-15 RX ORDER — POTASSIUM CHLORIDE 20 MEQ/15ML
40 SOLUTION ORAL
Status: DISCONTINUED | OUTPATIENT
Start: 2020-06-16 | End: 2020-06-17

## 2020-06-15 RX ORDER — PRAVASTATIN SODIUM 40 MG/1
80 TABLET ORAL DAILY
Status: DISCONTINUED | OUTPATIENT
Start: 2020-06-16 | End: 2020-06-20 | Stop reason: HOSPADM

## 2020-06-15 RX ORDER — SODIUM,POTASSIUM PHOSPHATES 280-250MG
2 POWDER IN PACKET (EA) ORAL
Status: DISCONTINUED | OUTPATIENT
Start: 2020-06-16 | End: 2020-06-17

## 2020-06-15 RX ORDER — ACETAMINOPHEN 325 MG/1
650 TABLET ORAL EVERY 4 HOURS PRN
Status: DISCONTINUED | OUTPATIENT
Start: 2020-06-16 | End: 2020-06-20 | Stop reason: HOSPADM

## 2020-06-15 RX ORDER — ONDANSETRON 2 MG/ML
4 INJECTION INTRAMUSCULAR; INTRAVENOUS EVERY 8 HOURS PRN
Status: DISCONTINUED | OUTPATIENT
Start: 2020-06-16 | End: 2020-06-18

## 2020-06-15 RX ORDER — LANOLIN ALCOHOL/MO/W.PET/CERES
800 CREAM (GRAM) TOPICAL
Status: DISCONTINUED | OUTPATIENT
Start: 2020-06-16 | End: 2020-06-16

## 2020-06-15 RX ORDER — HEPARIN SODIUM 5000 [USP'U]/ML
5000 INJECTION, SOLUTION INTRAVENOUS; SUBCUTANEOUS EVERY 8 HOURS
Status: DISCONTINUED | OUTPATIENT
Start: 2020-06-16 | End: 2020-06-16

## 2020-06-15 RX ORDER — KETOROLAC TROMETHAMINE 30 MG/ML
15 INJECTION, SOLUTION INTRAMUSCULAR; INTRAVENOUS EVERY 6 HOURS PRN
Status: DISCONTINUED | OUTPATIENT
Start: 2020-06-16 | End: 2020-06-19

## 2020-06-15 RX ORDER — RAMIPRIL 2.5 MG/1
10 CAPSULE ORAL DAILY
Status: DISCONTINUED | OUTPATIENT
Start: 2020-06-16 | End: 2020-06-20 | Stop reason: HOSPADM

## 2020-06-15 RX ORDER — SODIUM CHLORIDE 0.9 % (FLUSH) 0.9 %
10 SYRINGE (ML) INJECTION
Status: DISCONTINUED | OUTPATIENT
Start: 2020-06-16 | End: 2020-06-20 | Stop reason: HOSPADM

## 2020-06-15 RX ORDER — ONDANSETRON 8 MG/1
8 TABLET, ORALLY DISINTEGRATING ORAL EVERY 8 HOURS PRN
Status: DISCONTINUED | OUTPATIENT
Start: 2020-06-16 | End: 2020-06-20 | Stop reason: HOSPADM

## 2020-06-15 RX ORDER — ASPIRIN 325 MG
325 TABLET ORAL DAILY
Status: DISCONTINUED | OUTPATIENT
Start: 2020-06-16 | End: 2020-06-20 | Stop reason: HOSPADM

## 2020-06-15 RX ORDER — SPIRONOLACTONE 25 MG/1
25 TABLET ORAL DAILY
Status: DISCONTINUED | OUTPATIENT
Start: 2020-06-16 | End: 2020-06-20 | Stop reason: HOSPADM

## 2020-06-15 RX ORDER — CARVEDILOL 25 MG/1
25 TABLET ORAL 2 TIMES DAILY
Status: DISCONTINUED | OUTPATIENT
Start: 2020-06-16 | End: 2020-06-16

## 2020-06-15 RX ORDER — FUROSEMIDE 80 MG/1
80 TABLET ORAL DAILY
Status: DISCONTINUED | OUTPATIENT
Start: 2020-06-16 | End: 2020-06-20 | Stop reason: HOSPADM

## 2020-06-15 RX ADMIN — BACITRACIN ZINC, NEOMYCIN, POLYMYXIN B: 400; 3.5; 5 OINTMENT TOPICAL at 07:06

## 2020-06-15 NOTE — Clinical Note
ICD GEN MDT Amplia MRI QUAD SN: [id number] DOI: 2/13/2019 was explanted due to infection and returned to

## 2020-06-15 NOTE — ED PROVIDER NOTES
Encounter Date: 6/15/2020    SCRIBE #1 NOTE: I, Liudmila Valladares, am scribing for, and in the presence of,  Jm Retana MD. I have scribed the following portions of the note - Other sections scribed: HPI,ROS.       History     Chief Complaint   Patient presents with    Pacemaker Problem     Pt pacemaker is protruding through pt skin of chest, no wounds, no bleeding. no pain, Pt denies pain or discomfort. Pt denies pacemaker shocking him. Pt was suppose to be seen july,08,2020 to follow up with cardiologist.     65-year-old male with past medical history of HTN, HLD, Obesity, and CHF presenting to ED for pacemaker that is eroded through the skin in his left pectoral chest that happened today. Pt reports he was cutting grass today when he noticed his pacemake wound was leaking. He was later watching television and noticed there was shiny stainless steel on his left pectoral chest. He reports having his pacemake done on 02/13/19. He denies any further complaints. No known drug allergies. He denies being a smoker.     The history is provided by the patient. No  was used.     Review of patient's allergies indicates:  No Known Allergies  Past Medical History:   Diagnosis Date    Anticoagulant long-term use     Aspirin    CHF (congestive heart failure)     Hyperlipidemia     Hypertension     NICM (nonischemic cardiomyopathy) 6/16/2020    Obesity      Past Surgical History:   Procedure Laterality Date    INSERTION OF BIVENTRICULAR IMPLANTABLE CARDIOVERTER-DEFIBRILLATOR (ICD) N/A 2/13/2019    Procedure: INSERTION, ICD, BIVENTRICULAR;  Surgeon: Shailesh Eng MD;  Location: [facility] CATH LAB;  Service: Cardiology;  Laterality: N/A;  RN PRE OP 2-6-19  1ST CASE PER  [facility]. NOTIFIED RADHA THAT ANESTHESIA IS NOT PITO FOR 1ST CASE START-LO    oral extraction  11/2018    TESTICLE SURGERY       Family History   Problem Relation Age of Onset    Epilepsy Mother      Social History     Tobacco Use    Smoking  status: Former Smoker     Packs/day: 0.50     Years: 40.00     Pack years: 20.00     Types: Cigarettes, Cigars     Quit date:      Years since quittin.4    Smokeless tobacco: Never Used   Substance Use Topics    Alcohol use: Yes     Comment: once a month    Drug use: No     Review of Systems   Constitutional: Negative for chills, diaphoresis and fever.   HENT: Negative for ear pain and sore throat.    Eyes: Negative for pain.   Respiratory: Negative for cough and shortness of breath.    Cardiovascular: Negative for chest pain.   Gastrointestinal: Negative for abdominal pain, diarrhea and vomiting.   Genitourinary: Negative for dysuria.   Musculoskeletal: Negative for back pain.   Skin: Negative for rash.   Neurological: Negative for headaches.       Physical Exam     Initial Vitals [06/15/20 1605]   BP Pulse Resp Temp SpO2   109/60 60 17 97.7 °F (36.5 °C) 98 %      MAP       --         Physical Exam  The patient was examined specifically for the following:   General:No significant distress, Good color, Warm and dry. Head and neck:Scalp atraumatic, Neck supple. Neurological:Appropriate conversation, Gross motor deficits. Eyes:Conjugate gaze, Clear corneas. ENT: No epistaxis. Cardiac: Regular rate and rhythm, Grossly normal heart tones. Pulmonary: Wheezing, Rales. Gastrointestinal: Abdominal tenderness, Abdominal distention. Musculoskeletal: Extremity deformity, Apparent pain with range of motion of the joints. Skin: Rash.   The findings on examination were normal except for the following:  This patient has a pacemaker that is eroded through the skin in his left pectoral chest.  There is 1 cm of shunt any stainless steel showing through the wound.  The lungs are clear the heart tones are normal there is no surrounding erythema warmth or ecchymosis the patient does not appear to be septic ill or toxic.  ED Course   Procedures  Labs Reviewed - No data to display     ECG Results    None       Imaging Results     None       Medical decision making:  Given the above, this patient presents to the emergency with a pacemaker that is eroded through the skin of his chest wall.  I will treat with Neosporin.  I will dress this.  I will transfer the patient to [facility] for evaluation by Dr. Kwong.  I discussed this case with him on the phone at 7:00 p.m..  I also discussed the case with Dr. Kaplan, cardiology who suggested the plan.  Patient is otherwise well                Scribe Attestation:   Scribe #1: I performed the above scribed service and the documentation accurately describes the services I performed. I attest to the accuracy of the note.                          Clinical Impression:       ICD-10-CM ICD-9-CM   1. Erosion of pacemaker pocket due to and not concurrent with implantation of cardiac pacemaker  T82.897S 909.3   2. Chronic combined systolic and diastolic congestive heart failure  I50.42 428.42     428.0   3. Pacemaker complications, initial encounter  T82.9XXA 996.72   4. NICM (nonischemic cardiomyopathy)  I42.8 425.4   5. Infection of pacemaker pocket  T82.7XXA 996.61   6. Infection of implantable cardioverter-defibrillator (ICD) lead  T82.7XXA 996.61   7. Infection of biventricular implantable cardioverter-defibrillator (ICD), subsequent encounter  T82.7XXD V58.89   8. Infection of pacemaker pocket, subsequent encounter  T82.7XXD V58.89   9. Osteoarthritis, unspecified osteoarthritis type, unspecified site  M19.90 715.90             ED Disposition Condition    Transfer to Another Facility Stable                          Jm Retana MD  06/20/20 1881

## 2020-06-15 NOTE — Clinical Note
Prepped: groin and chest. Prepped with: ChloraPrep and Betadine. The site was clipped. The patient was draped.

## 2020-06-15 NOTE — ED TRIAGE NOTES
Pt presents to ED c/o open wound where is pacemaker is in his upper left chest wall.  Pt states he was puss coming out, but denies fever, chills, n/v

## 2020-06-15 NOTE — PROVIDER PROGRESS NOTES - EMERGENCY DEPT.
" Emergency Department TeleTRIAGE Encounter Note      CHIEF COMPLAINT    Chief Complaint   Patient presents with    Pacemaker Problem     Pt pacemaker is protruding through pt skin of chest, no wounds, no bleeding. no pain, Pt denies pain or discomfort. Pt denies pacemaker shocking him. Pt was suppose to be seen july,08,2020 to follow up with cardiologist.       VITAL SIGNS   Initial Vitals [06/15/20 1605]   BP Pulse Resp Temp SpO2   109/60 60 17 97.7 °F (36.5 °C) 98 %      MAP       --            ALLERGIES    Review of patient's allergies indicates:  No Known Allergies    PROVIDER TRIAGE NOTE  Patient with PMH CHF, hyperlipidemia, hypertension presents for evaluation of ICD problem. Patient reports pacemaker is "protruding through chest." ICD placed on 2/13/2019.  Patient states he was cutting the grass earlier today and felt something rubbing against his shirt.  He noticed that there was a break in the skin and the ICD was protruding through.  He denies chest pain or discomfort, shortness of breath, bleeding, or inappropriate shocks.       ORDERS  Labs Reviewed - No data to display    ED Orders (720h ago, onward)    None            Virtual Visit Note: The provider triage portion of this emergency department evaluation and documentation was performed via Dr. Jerry's Smooth [employer], a HIPAA-compliant telemedicine application, in concert with a tele-presenter in the room. A face to face patient evaluation with one of my colleagues will occur once the patient is placed in an emergency department room.      DISCLAIMER: This note was prepared with [employer] voice recognition transcription software. Garbled syntax, mangled pronouns, and other bizarre constructions may be attributed to that software system.  "

## 2020-06-16 ENCOUNTER — ANESTHESIA EVENT (OUTPATIENT)
Dept: MEDSURG UNIT | Facility: HOSPITAL | Age: 65
DRG: 261 | End: 2020-06-16
Payer: MEDICARE

## 2020-06-16 ENCOUNTER — PATIENT OUTREACH (OUTPATIENT)
Dept: ADMINISTRATIVE | Facility: OTHER | Age: 65
End: 2020-06-16

## 2020-06-16 PROBLEM — I42.8 NICM (NONISCHEMIC CARDIOMYOPATHY): Chronic | Status: ACTIVE | Noted: 2020-06-16

## 2020-06-16 PROBLEM — T82.7XXA: Status: ACTIVE | Noted: 2020-06-16

## 2020-06-16 LAB
ALBUMIN SERPL BCP-MCNC: 3.4 G/DL (ref 3.5–5.2)
ALP SERPL-CCNC: 61 U/L (ref 55–135)
ALT SERPL W/O P-5'-P-CCNC: 9 U/L (ref 10–44)
ANION GAP SERPL CALC-SCNC: 10 MMOL/L (ref 8–16)
AST SERPL-CCNC: 16 U/L (ref 10–40)
BASOPHILS # BLD AUTO: 0.04 K/UL (ref 0–0.2)
BASOPHILS NFR BLD: 0.7 % (ref 0–1.9)
BILIRUB SERPL-MCNC: 0.6 MG/DL (ref 0.1–1)
BUN SERPL-MCNC: 22 MG/DL (ref 8–23)
CALCIUM SERPL-MCNC: 9 MG/DL (ref 8.7–10.5)
CHLORIDE SERPL-SCNC: 107 MMOL/L (ref 95–110)
CO2 SERPL-SCNC: 27 MMOL/L (ref 23–29)
CREAT SERPL-MCNC: 1.4 MG/DL (ref 0.5–1.4)
CRP SERPL-MCNC: 4.4 MG/L (ref 0–8.2)
DIFFERENTIAL METHOD: ABNORMAL
EOSINOPHIL # BLD AUTO: 0.2 K/UL (ref 0–0.5)
EOSINOPHIL NFR BLD: 3.6 % (ref 0–8)
ERYTHROCYTE [DISTWIDTH] IN BLOOD BY AUTOMATED COUNT: 13.9 % (ref 11.5–14.5)
ERYTHROCYTE [SEDIMENTATION RATE] IN BLOOD BY WESTERGREN METHOD: 38 MM/HR (ref 0–23)
EST. GFR  (AFRICAN AMERICAN): >60 ML/MIN/1.73 M^2
EST. GFR  (NON AFRICAN AMERICAN): 52.4 ML/MIN/1.73 M^2
GLUCOSE SERPL-MCNC: 105 MG/DL (ref 70–110)
HCT VFR BLD AUTO: 41.2 % (ref 40–54)
HGB BLD-MCNC: 12 G/DL (ref 14–18)
IMM GRANULOCYTES # BLD AUTO: 0.01 K/UL (ref 0–0.04)
IMM GRANULOCYTES NFR BLD AUTO: 0.2 % (ref 0–0.5)
INR PPP: 1.1 (ref 0.8–1.2)
LYMPHOCYTES # BLD AUTO: 1.9 K/UL (ref 1–4.8)
LYMPHOCYTES NFR BLD: 32 % (ref 18–48)
MAGNESIUM SERPL-MCNC: 1.9 MG/DL (ref 1.6–2.6)
MCH RBC QN AUTO: 24.7 PG (ref 27–31)
MCHC RBC AUTO-ENTMCNC: 29.1 G/DL (ref 32–36)
MCV RBC AUTO: 85 FL (ref 82–98)
MONOCYTES # BLD AUTO: 0.9 K/UL (ref 0.3–1)
MONOCYTES NFR BLD: 14.4 % (ref 4–15)
NEUTROPHILS # BLD AUTO: 2.9 K/UL (ref 1.8–7.7)
NEUTROPHILS NFR BLD: 49.1 % (ref 38–73)
NRBC BLD-RTO: 0 /100 WBC
PHOSPHATE SERPL-MCNC: 3.4 MG/DL (ref 2.7–4.5)
PLATELET # BLD AUTO: 165 K/UL (ref 150–350)
PMV BLD AUTO: 12 FL (ref 9.2–12.9)
POTASSIUM SERPL-SCNC: 3.9 MMOL/L (ref 3.5–5.1)
PROCALCITONIN SERPL IA-MCNC: 0.04 NG/ML
PROT SERPL-MCNC: 7.1 G/DL (ref 6–8.4)
PROTHROMBIN TIME: 11.4 SEC (ref 9–12.5)
RBC # BLD AUTO: 4.85 M/UL (ref 4.6–6.2)
SODIUM SERPL-SCNC: 144 MMOL/L (ref 136–145)
WBC # BLD AUTO: 5.9 K/UL (ref 3.9–12.7)

## 2020-06-16 PROCEDURE — 20600001 HC STEP DOWN PRIVATE ROOM: Mod: HCNC

## 2020-06-16 PROCEDURE — 99233 SBSQ HOSP IP/OBS HIGH 50: CPT | Mod: HCNC,,, | Performed by: HOSPITALIST

## 2020-06-16 PROCEDURE — 99223 PR INITIAL HOSPITAL CARE,LEVL III: ICD-10-PCS | Mod: HCNC,,, | Performed by: PHYSICIAN ASSISTANT

## 2020-06-16 PROCEDURE — 93010 EKG 12-LEAD: ICD-10-PCS | Mod: HCNC,,, | Performed by: INTERNAL MEDICINE

## 2020-06-16 PROCEDURE — 36415 COLL VENOUS BLD VENIPUNCTURE: CPT | Mod: HCNC

## 2020-06-16 PROCEDURE — 84100 ASSAY OF PHOSPHORUS: CPT | Mod: HCNC

## 2020-06-16 PROCEDURE — 80053 COMPREHEN METABOLIC PANEL: CPT | Mod: HCNC

## 2020-06-16 PROCEDURE — 25000003 PHARM REV CODE 250: Mod: HCNC | Performed by: HOSPITALIST

## 2020-06-16 PROCEDURE — 99223 1ST HOSP IP/OBS HIGH 75: CPT | Mod: HCNC,,, | Performed by: PHYSICIAN ASSISTANT

## 2020-06-16 PROCEDURE — 25000003 PHARM REV CODE 250: Mod: HCNC | Performed by: NURSE PRACTITIONER

## 2020-06-16 PROCEDURE — 85652 RBC SED RATE AUTOMATED: CPT | Mod: HCNC

## 2020-06-16 PROCEDURE — 99233 PR SUBSEQUENT HOSPITAL CARE,LEVL III: ICD-10-PCS | Mod: HCNC,,, | Performed by: HOSPITALIST

## 2020-06-16 PROCEDURE — 99233 PR SUBSEQUENT HOSPITAL CARE,LEVL III: ICD-10-PCS | Mod: 57,HCNC,, | Performed by: INTERNAL MEDICINE

## 2020-06-16 PROCEDURE — 99233 SBSQ HOSP IP/OBS HIGH 50: CPT | Mod: 57,HCNC,, | Performed by: INTERNAL MEDICINE

## 2020-06-16 PROCEDURE — 84145 PROCALCITONIN (PCT): CPT | Mod: HCNC

## 2020-06-16 PROCEDURE — 93010 ELECTROCARDIOGRAM REPORT: CPT | Mod: HCNC,,, | Performed by: INTERNAL MEDICINE

## 2020-06-16 PROCEDURE — 93005 ELECTROCARDIOGRAM TRACING: CPT | Mod: HCNC

## 2020-06-16 PROCEDURE — 83735 ASSAY OF MAGNESIUM: CPT | Mod: HCNC

## 2020-06-16 PROCEDURE — 86140 C-REACTIVE PROTEIN: CPT | Mod: HCNC

## 2020-06-16 PROCEDURE — 85610 PROTHROMBIN TIME: CPT | Mod: HCNC

## 2020-06-16 PROCEDURE — 87040 BLOOD CULTURE FOR BACTERIA: CPT | Mod: HCNC

## 2020-06-16 PROCEDURE — 85025 COMPLETE CBC W/AUTO DIFF WBC: CPT | Mod: HCNC

## 2020-06-16 PROCEDURE — 63600175 PHARM REV CODE 636 W HCPCS: Mod: HCNC | Performed by: NURSE PRACTITIONER

## 2020-06-16 RX ORDER — CARVEDILOL 25 MG/1
25 TABLET ORAL 2 TIMES DAILY
Status: DISCONTINUED | OUTPATIENT
Start: 2020-06-16 | End: 2020-06-17

## 2020-06-16 RX ORDER — HYDROCODONE BITARTRATE AND ACETAMINOPHEN 500; 5 MG/1; MG/1
TABLET ORAL
Status: DISCONTINUED | OUTPATIENT
Start: 2020-06-16 | End: 2020-06-18

## 2020-06-16 RX ORDER — LIDOCAINE 50 MG/G
1 PATCH TOPICAL
Status: DISCONTINUED | OUTPATIENT
Start: 2020-06-16 | End: 2020-06-20 | Stop reason: HOSPADM

## 2020-06-16 RX ORDER — HEPARIN SODIUM 5000 [USP'U]/ML
5000 INJECTION, SOLUTION INTRAVENOUS; SUBCUTANEOUS EVERY 8 HOURS
Status: DISCONTINUED | OUTPATIENT
Start: 2020-06-16 | End: 2020-06-16

## 2020-06-16 RX ADMIN — LIDOCAINE 1 PATCH: 50 PATCH TOPICAL at 03:06

## 2020-06-16 RX ADMIN — ASPIRIN 325 MG ORAL TABLET 325 MG: 325 PILL ORAL at 08:06

## 2020-06-16 RX ADMIN — CARVEDILOL 25 MG: 25 TABLET, FILM COATED ORAL at 08:06

## 2020-06-16 RX ADMIN — HEPARIN SODIUM 5000 UNITS: 5000 INJECTION INTRAVENOUS; SUBCUTANEOUS at 05:06

## 2020-06-16 RX ADMIN — CARVEDILOL 25 MG: 25 TABLET, FILM COATED ORAL at 12:06

## 2020-06-16 RX ADMIN — HEPARIN SODIUM 5000 UNITS: 5000 INJECTION INTRAVENOUS; SUBCUTANEOUS at 02:06

## 2020-06-16 RX ADMIN — RAMIPRIL 10 MG: 2.5 CAPSULE ORAL at 08:06

## 2020-06-16 RX ADMIN — FUROSEMIDE 80 MG: 80 TABLET ORAL at 08:06

## 2020-06-16 RX ADMIN — PRAVASTATIN SODIUM 80 MG: 40 TABLET ORAL at 08:06

## 2020-06-16 RX ADMIN — SPIRONOLACTONE 25 MG: 25 TABLET ORAL at 08:06

## 2020-06-16 NOTE — CONSULTS
[facility]  Infectious Disease  Consult Note    Patient Name: Papito Bhakta  MRN: [id number]  Admission Date: 6/15/2020  Hospital Length of Stay: 1 days  Attending Physician: Carlos Medrano  Primary Care Provider: Brynn To MD     Inpatient consult to Infectious Diseases  Consult performed by: YASH Sloan Jr.  Consult ordered by: Nieves Medrano MD      Consult received.  Full consult to follow.    YASH Marie  Infectious Disease  [facility]

## 2020-06-16 NOTE — HPI
"Papito Bhakta is a 65 y.o. male with a significant medical history of CHF, HLD, HTN, s/p AICD placement who presents to the hospital as a transfer from [facility] for evaluation of AICD/pacemaker protrusion through skin.  The patient was in his usual state of health and states he had been cutting grass earlier in the day when he noted a "sticking" sensation near his pacemaker.  He subsequently took his shirt off and noted the device protruding through his skin prompting him to present to the OSH for evaluation.  The patient denies any pain, numbness, weakness, fever, CP, SOB, or device discharges.  He did not report anything that exacerbated or alleviated his symptom.  He was transferred to [facility] for higher level of care.  Currently the patient is AA&Ox3.  He is c/o RLE pain (that he associates w/arthritis) but has no other complaints at this time.  AICD device easily seen protruding through the skin of the patient's left chest wall.  No drainage or erythema.  The patient is admitted to Hospital Medicine.   "

## 2020-06-16 NOTE — PLAN OF CARE
Pt NPO tonight at midnight to prepare for procedure tomorrow. Pt heparin discontinued tonight prior to procedure. Vital signs WNL, no complaints at this time. Will continue to monitor.

## 2020-06-16 NOTE — ASSESSMENT & PLAN NOTE
Presented with acute dehiscence of left-sided Bi V ICD pocket infection. Original implant Bi-V ICD - [employer] 2/13/19- [place]    Plan:  Blood cultures are pending  Recommend ID consult for consideration for duration of IV antibiotics  No fever or white count.  ESR is mildly elevated.  Plan on complete device extraction on 06/17/2020 with Dr. Kwong  Device interrogation today to determine pacing dependence to see if the temporary pacing wires indicated  Patient will require LifeVest prior to discharge.  After extraction and completion of antibiotic therapy, will have patient follow up in clinic for consideration of right-sided Bi-V ICD implantation for primary prevention

## 2020-06-16 NOTE — PROGRESS NOTES
"Hospital Medicine   Progress note      [place] [facility] [place] Nieves Medrano MD   Admit Date: 6/15/2020   Hospital Day: 1  MARIIA: 6/17/2020   Code status: Full Code   Principal Problem: Erosion of pacemaker pocket due to and not concurrent with implantation of cardiac pacemaker     Summary:  Papito Bhakta is a 65 y.o. male with a PMHx of combined HF (EF 20%, G3DD), AICD in place, HLD, HTN, , chronic LLE edema due to venous statsis, who presents to the hospital as a transfer from [facility] for evaluation of AICD/pacemaker protrusion through skin.  The patient was in his usual state of health and states he had been cutting grass earlier in the day when he noted a "sticking" sensation near his pacemaker.  He subsequently took his shirt off and noted the device protruding through his skin prompting him to present to the OSH for evaluation.   he did note some drainage out of that area for about 1 week.  The patient denies any pain, numbness, weakness, fever, CP, SOB, or device discharges. He was transferred to [facility] for higher level of care.  AICD device easily seen protruding through the skin of the patient's left chest wall.  No drainage or erythema.  The patient is admitted to Hospital Medicine, EP consulted.  Patient will undergo device extraction on 06/17.  ICD interrogation ordered to ensure that patient is not pacemaker dependent.    Interval hx:   Pt was seen and examined at bedside. Pt had no acute events overnight, and no new complaints this morning. Pt remained hemodynamically stable and afebrile.  Continues to have Bi V ICD device eroding to the skin.  Some tenderness over that area and general arthritis pain over bilateral lower extremities.  Pt has been tolerating PO intake well without any nausea, vomiting, diarrhea, constipation, blood in stools or trouble urinating. Pt denies any fevers, chills, malaise, headaches, chest pain, SOB, cough.     ROS (Positive in Bold, otherwise " negative)  Constitutional: fever, chills, night sweats  CV: chest pain, edema, palpitations  Resp: SOB, cough, sputum production  GI: changes in appetite, NVDC, pain, melena, hematochezia, GERD, hematemesis  : Dysuria, hematuria, urinary urgency, frequency  MSK: arthralgia/myalgia, joint swelling  Neuro/Psych: anxiety, depression    PEx   Temp:  [97.5 °F (36.4 °C)-98 °F (36.7 °C)]   Pulse:  [59-69]   Resp:  [16-18]   BP: (109-137)/(58-85)   SpO2:  [96 %-99 %]      I & O (Last 24H):     Intake/Output Summary (Last 24 hours) at 6/16/2020 0827  Last data filed at 6/16/2020 0600  Gross per 24 hour   Intake 240 ml   Output 600 ml   Net -360 ml       General:  male  in no acute distress. Nontoxic. Resting in bed. Cooperative.  HEENT: NCAT. PERRL. EOMI. Sclera Anicteric.  CVS: RRR. Normal S1 S2. No murmurs.  ICD device seen protruding out of skin over left chest wall.  Pulm: CTAB. Normal respiratory effort. No wheezes, rhonchi, or crackles.  Abdomen: Soft. Non-distended. No tenderness to palpation. No rebound or guarding. +BS.  Extremities: No edema. No cyanosis. Full ROM.  Neuro: Alert, oriented x 4, Spont mvt of all extremities with no focal deficits noted.    Recent Results (from the past 24 hour(s))   COVID-19 Rapid Screening    Collection Time: 06/15/20  8:01 PM   Result Value Ref Range    SARS-CoV-2 RNA, Amplification, Qual Negative Negative   Comprehensive Metabolic Panel (CMP)    Collection Time: 06/16/20 12:29 AM   Result Value Ref Range    Sodium 144 136 - 145 mmol/L    Potassium 3.9 3.5 - 5.1 mmol/L    Chloride 107 95 - 110 mmol/L    CO2 27 23 - 29 mmol/L    Glucose 105 70 - 110 mg/dL    BUN, Bld 22 8 - 23 mg/dL    Creatinine 1.4 0.5 - 1.4 mg/dL    Calcium 9.0 8.7 - 10.5 mg/dL    Total Protein 7.1 6.0 - 8.4 g/dL    Albumin 3.4 (L) 3.5 - 5.2 g/dL    Total Bilirubin 0.6 0.1 - 1.0 mg/dL    Alkaline Phosphatase 61 55 - 135 U/L    AST 16 10 - 40 U/L    ALT 9 (L) 10 - 44 U/L    Anion Gap 10 8 - 16  mmol/L    eGFR if African American >60.0 >60 mL/min/1.73 m^2    eGFR if non African American 52.4 (A) >60 mL/min/1.73 m^2   Magnesium    Collection Time: 06/16/20 12:29 AM   Result Value Ref Range    Magnesium 1.9 1.6 - 2.6 mg/dL   Phosphorus    Collection Time: 06/16/20 12:29 AM   Result Value Ref Range    Phosphorus 3.4 2.7 - 4.5 mg/dL   CBC with Automated Differential    Collection Time: 06/16/20 12:29 AM   Result Value Ref Range    WBC 5.90 3.90 - 12.70 K/uL    RBC 4.85 4.60 - 6.20 M/uL    Hemoglobin 12.0 (L) 14.0 - 18.0 g/dL    Hematocrit 41.2 40.0 - 54.0 %    Mean Corpuscular Volume 85 82 - 98 fL    Mean Corpuscular Hemoglobin 24.7 (L) 27.0 - 31.0 pg    Mean Corpuscular Hemoglobin Conc 29.1 (L) 32.0 - 36.0 g/dL    RDW 13.9 11.5 - 14.5 %    Platelets 165 150 - 350 K/uL    MPV 12.0 9.2 - 12.9 fL    Immature Granulocytes 0.2 0.0 - 0.5 %    Gran # (ANC) 2.9 1.8 - 7.7 K/uL    Immature Grans (Abs) 0.01 0.00 - 0.04 K/uL    Lymph # 1.9 1.0 - 4.8 K/uL    Mono # 0.9 0.3 - 1.0 K/uL    Eos # 0.2 0.0 - 0.5 K/uL    Baso # 0.04 0.00 - 0.20 K/uL    nRBC 0 0 /100 WBC    Gran% 49.1 38.0 - 73.0 %    Lymph% 32.0 18.0 - 48.0 %    Mono% 14.4 4.0 - 15.0 %    Eosinophil% 3.6 0.0 - 8.0 %    Basophil% 0.7 0.0 - 1.9 %    Differential Method Automated    Sedimentation rate    Collection Time: 06/16/20 12:29 AM   Result Value Ref Range    Sed Rate 38 (H) 0 - 23 mm/Hr   C-Reactive Protein    Collection Time: 06/16/20 12:29 AM   Result Value Ref Range    CRP 4.4 0.0 - 8.2 mg/L   Procalcitonin    Collection Time: 06/16/20 12:29 AM   Result Value Ref Range    Procalcitonin 0.04 <0.25 ng/mL   Blood culture (site 1)    Collection Time: 06/16/20 12:31 AM    Specimen: Blood   Result Value Ref Range    Blood Culture, Routine No Growth to date    Blood culture (site 2)    Collection Time: 06/16/20 12:31 AM    Specimen: Blood   Result Value Ref Range    Blood Culture, Routine No Growth to date        No results for input(s): POCTGLUCOSE in the last  168 hours.    No results found for: HGBA1C     Active Hospital Problems    Diagnosis  POA    *Erosion of pacemaker pocket due to and not concurrent with implantation of cardiac pacemaker [T82.897S]  Not Applicable    NICM (nonischemic cardiomyopathy) [I42.8]  Yes     Chronic    Pacemaker complications, initial encounter [T82.9XXA]  Yes    Hyperlipidemia [E78.5]  Yes     Chronic    Essential hypertension [I10]  Yes     Chronic    Chronic combined systolic and diastolic congestive heart failure [I50.42]  Yes      Resolved Hospital Problems   No resolved problems to display.          Assessment and Plan for problems addressed today:      aspirin  325 mg Oral Daily    carvediloL  25 mg Oral BID    furosemide  80 mg Oral Daily    heparin (porcine)  5,000 Units Subcutaneous Q8H    pravastatin  80 mg Oral Daily    ramipriL  10 mg Oral Daily    spironolactone  25 mg Oral Daily     acetaminophen, ketorolac, magnesium oxide, magnesium oxide, ondansetron, ondansetron, potassium chloride 10%, potassium chloride 10%, potassium, sodium phosphates, potassium, sodium phosphates, potassium, sodium phosphates, sodium chloride 0.9%    Erosion of pacemaker pocket due to and not concurrent with implantation of cardiac pacemaker  -patient with combined systolic and diastolic congestive heart failure, s/p ICD BIV placement in 02/2019  -Consulted Electrophysiology for AICD evaluation/stabilization  -device check ordered  -plan for extraction on 06/17/2020  -will need to obtain daily blood cultures.  Blood cultures from 06/15 with no growth to date  -patient has not been started any antibiotics yet.  Will follow blood cultures first  -patient without leukocytosis, fevers, any other signs or symptoms of infections.  -hold antibiotics for now, but low threshold to start antibiotics if pt were to become hemodynamically unstable or febrile   -Telemetry, Vitals per unit protocol  -monitor I/Os to r/o oliguria, end organ damage,  maintain UOP   -will consult infectious disease for antibiotic recommendations.        Chronic combined systolic and diastolic congestive heart failure  -Most recent echo on (1/8/2019) reveals an EF of 10%, left ventricular diastolic dysfunction, severely reduced right ventricular systolic function  -Continue symptomatic management by diuresis with lasix 80 mg p.o. daily  -Cont asa 325mg, ramipril, Coreg 25 mg b.i.d., pravastatin  -continue spironolactone given EF < 35%, NYHA class > II  -Continue to monitor on telemetry  -Monitor intake and output and obtain daily STANDING weights  -Fluid restriction to 1.5L per day and cardiac diet with salt restriction  -Supplemental O2 to keep Spo2 >90%, with PRN BiPAP for dyspnea/orthopnea  -Elevate head of bed     Essential hypertension  -Continue home ramipril, Coreg, spironolactone, Lasix  -monitor vitals q4h  -SBP goal of <160 in hospital      Hyperlipidemia  -Continue home pravastatin 80 mg daily     DVT PPx:  Heparin    Discharge plan and follow up: DC home once medically stable     Nieves Medrano MD  Hospital Medicine Staff  [phone] pager

## 2020-06-16 NOTE — PLAN OF CARE
Problem: Adult Inpatient Plan of Care  Goal: Plan of Care Review  Outcome: Ongoing, Progressing    Pt remained free from falls/trauma/injuries. No complaints of CP/SOB/discomfort.  Pt consult to EP for Pacemaker eval. Pt maintained NPO status after midnight. VSS. Fall bundle in place. POC explained, no questions at this time. Pt tolerating care.

## 2020-06-16 NOTE — HPI
Mr. Bhakta is a 65-year-old male with a past medical history significant for hypertension, hyperlipidemia, nonischemic cardiomyopathy status post Bi-V ICD 02/13/2013 [employer] with Dr. Shailesh Eng  EF 10%.  He notes increased drainage from his pocket site over the past 1 week.  Yesterday morning he was performing yardd work and noticed pain in his left pectoral region with acute dehiscence of his pocket.  This is the 1st issues with pocket that he has had the past. Denies fever, chills. Nausea. Vomiting. chest pain. or shortness of breath.  No issues of syncope.  No ICD shocks.     Discussed with patient today need for complete device extraction due to pocket infection.  Blood cultures are pending.    EF 10%, severely reduced RV dysfunction.

## 2020-06-16 NOTE — ASSESSMENT & PLAN NOTE
Papito Bhakta is a 65 y.o. male with a significant medical history of CHF, HLD, HTN, s/p AICD placement who presents to the hospital as a transfer from [facility] for evaluation of AICD/pacemaker protrusion through skin.  The patient reported no prior symptoms, fever, pain, or trauma.    -Consult Electrophysiology for AICD evaluation/stabilization

## 2020-06-16 NOTE — ASSESSMENT & PLAN NOTE
1. JASSON for evaluation of ICD extraction  -No absolute contraindications of esophageal stricture, tumor, perforation, laceration,or diverticulum and/or active GI bleed  -The risks, benefits & alternatives of the procedure were explained to the patient.   -The risks of transesophageal echo include but are not limited to:  Dental trauma, esophageal trauma/perforation, bleeding, laryngospasm/brochospasm, aspiration, sore throat/hoarseness, & dislodgement of the endotracheal tube/nasogastric tube (where applicable).    -The risks of moderate sedation include hypotension, respiratory depression, arrhythmias, bronchospasm, & death.    -Informed consent was obtained. The patient is agreeable to proceed with the procedure and all questions and concerns addressed.    Case discussed with an attending in echocardiography lab.     Further recommendations per attending addendum

## 2020-06-16 NOTE — SUBJECTIVE & OBJECTIVE
Past Medical History:   Diagnosis Date    Anticoagulant long-term use     Aspirin    CHF (congestive heart failure)     Hyperlipidemia     Hypertension     NICM (nonischemic cardiomyopathy) 6/16/2020    Obesity        Past Surgical History:   Procedure Laterality Date    INSERTION OF BIVENTRICULAR IMPLANTABLE CARDIOVERTER-DEFIBRILLATOR (ICD) N/A 2/13/2019    Procedure: INSERTION, ICD, BIVENTRICULAR;  Surgeon: Shailesh Eng MD;  Location: [facility] CATH LAB;  Service: Cardiology;  Laterality: N/A;  RN PRE OP 2-6-19  1ST CASE PER  RADHA. NOTIFIED RADHA THAT ANESTHESIA IS NOT PITO FOR 1ST CASE START-LO    oral extraction  11/2018    TESTICLE SURGERY         Review of patient's allergies indicates:  No Known Allergies    Medications:  Medications Prior to Admission   Medication Sig    aspirin 325 MG tablet Take 325 mg by mouth once daily.    carvediloL (COREG) 25 MG tablet Take 1 tablet (25 mg total) by mouth 2 (two) times daily.    pravastatin (PRAVACHOL) 80 MG tablet Take 1 tablet (80 mg total) by mouth once daily.    ramipril (ALTACE) 10 MG capsule Take 1 capsule (10 mg total) by mouth once daily.    spironolactone (ALDACTONE) 25 MG tablet Take 1 tablet (25 mg total) by mouth once daily.    diclofenac sodium (VOLTAREN) 1 % Gel Apply 2 g topically 4 (four) times daily.    furosemide (LASIX) 80 MG tablet Take 1 tablet (80 mg total) by mouth once daily.     Antibiotics (From admission, onward)    None        Antifungals (From admission, onward)    None        Antivirals (From admission, onward)    None           Immunization History   Administered Date(s) Administered    Influenza 10/30/2017    Influenza - Quadrivalent - PF (6 months and older) 11/05/2018, 10/16/2019    Pneumococcal Polysaccharide - 23 Valent 06/06/2018    Td (ADULT) 06/06/2018    Td - PF (ADULT) 06/06/2018       Family History     Problem Relation (Age of Onset)    Epilepsy Mother        Social History     Socioeconomic History     Marital status:      Spouse name: Not on file    Number of children: Not on file    Years of education: Not on file    Highest education level: Not on file   Occupational History    Not on file   Social Needs    Financial resource strain: Not on file    Food insecurity     Worry: Not on file     Inability: Not on file    Transportation needs     Medical: Not on file     Non-medical: Not on file   Tobacco Use    Smoking status: Former Smoker     Packs/day: 0.50     Years: 40.00     Pack years: 20.00     Types: Cigarettes, Cigars     Quit date:      Years since quittin.4    Smokeless tobacco: Never Used   Substance and Sexual Activity    Alcohol use: Yes     Comment: once a month    Drug use: No    Sexual activity: Yes     Birth control/protection: None     Comment: uses protection sometimes   Lifestyle    Physical activity     Days per week: Not on file     Minutes per session: Not on file    Stress: Not on file   Relationships    Social connections     Talks on phone: Not on file     Gets together: Not on file     Attends Hindu service: Not on file     Active member of club or organization: Not on file     Attends meetings of clubs or organizations: Not on file     Relationship status: Not on file   Other Topics Concern    Not on file   Social History Narrative    Not on file     Review of Systems   Constitutional: Negative for appetite change, chills, diaphoresis, fatigue, fever and unexpected weight change.   HENT: Negative for congestion, ear pain, sore throat and tinnitus.    Eyes: Negative for pain, redness and visual disturbance.   Respiratory: Negative for cough, shortness of breath and wheezing.    Cardiovascular: Negative for chest pain, palpitations and leg swelling.   Gastrointestinal: Negative for abdominal pain, constipation, diarrhea, rectal pain and vomiting.   Endocrine: Negative for cold intolerance and heat intolerance.   Genitourinary: Negative for dysuria,  flank pain, frequency, hematuria and urgency.   Musculoskeletal: Negative for arthralgias, back pain, myalgias and neck pain.   Skin: Positive for wound. Negative for rash.   Allergic/Immunologic: Negative for immunocompromised state.   Neurological: Negative for dizziness, light-headedness, numbness and headaches.   Hematological: Negative for adenopathy. Does not bruise/bleed easily.   Psychiatric/Behavioral: Negative for confusion and sleep disturbance. The patient is not nervous/anxious.      Objective:     Vital Signs (Most Recent):  Temp: 97.9 °F (36.6 °C) (06/16/20 0737)  Pulse: 60 (06/16/20 0737)  Resp: 17 (06/16/20 0737)  BP: 120/81 (06/16/20 0737)  SpO2: 97 % (06/16/20 0737) Vital Signs (24h Range):  Temp:  [97.5 °F (36.4 °C)-98 °F (36.7 °C)] 97.9 °F (36.6 °C)  Pulse:  [59-69] 60  Resp:  [16-18] 17  SpO2:  [96 %-99 %] 97 %  BP: (109-137)/(58-85) 120/81     Weight: (!) 145.8 kg (321 lb 6.4 oz)  Body mass index is 38.11 kg/m².    Estimated Creatinine Clearance: 83.2 mL/min (based on SCr of 1.4 mg/dL).    Physical Exam  Constitutional:       General: He is not in acute distress.     Appearance: He is well-developed. He is not diaphoretic.       HENT:      Head: Normocephalic and atraumatic.   Cardiovascular:      Rate and Rhythm: Normal rate and regular rhythm.      Heart sounds: Normal heart sounds. No murmur. No friction rub. No gallop.    Pulmonary:      Effort: Pulmonary effort is normal. No respiratory distress.      Breath sounds: Normal breath sounds. No wheezing or rales.   Abdominal:      General: Bowel sounds are normal. There is no distension.      Palpations: Abdomen is soft. There is no mass.      Tenderness: There is no abdominal tenderness. There is no guarding or rebound.   Skin:     General: Skin is warm and dry.   Neurological:      Mental Status: He is alert and oriented to person, place, and time.   Psychiatric:         Behavior: Behavior normal.                 Significant Labs:   Blood  Culture:   Recent Labs   Lab 06/16/20  0031   LABBLOO No Growth to date  No Growth to date     CBC:   Recent Labs   Lab 06/16/20  0029   WBC 5.90   HGB 12.0*   HCT 41.2        CMP:   Recent Labs   Lab 06/16/20  0029      K 3.9      CO2 27      BUN 22   CREATININE 1.4   CALCIUM 9.0   PROT 7.1   ALBUMIN 3.4*   BILITOT 0.6   ALKPHOS 61   AST 16   ALT 9*   ANIONGAP 10   EGFRNONAA 52.4*     Wound Culture: No results for input(s): LABAERO in the last 4320 hours.    Significant Imaging: I have reviewed all pertinent imaging results/findings within the past 24 hours. None

## 2020-06-16 NOTE — CONSULTS
[facility]  Cardiology  Consult Note    Patient Name: Papito Bhakta  MRN: [id number]  Admission Date: 6/15/2020  Hospital Length of Stay: 1 days  Code Status: Full Code   Attending Provider: Carlos Medrano   Consulting Provider: Morales Dickerson MD  Primary Care Physician: Brynn To MD  Principal Problem:Erosion of pacemaker pocket due to and not concurrent with implantation of cardiac pacemaker    Patient information was obtained from patient and ER records.     Consults  Subjective:     Chief Complaint:  ICD Infection     HPI:   Mr. Bhakta is a 65-year-old male with a past medical history significant for hypertension, hyperlipidemia, nonischemic cardiomyopathy status post Bi-V ICD 02/13/2013 [employer] with Dr. Shailesh Eng  EF 10%.  He notes increased drainage from his pocket site over the past 1 week.  Yesterday morning he was performing yardd work and noticed pain in his left pectoral region with acute dehiscence of his pocket.  This is the 1st issues with pocket that he has had the past. Denies fever, chills. Nausea. Vomiting. chest pain. or shortness of breath.  No issues of syncope.  No ICD shocks.      Discussed with patient today need for complete device extraction due to pocket infection.    · Limited study to assess function  · Severely decreased left ventricular systolic function. The estimated ejection fraction is 10%  · Left ventricular diastolic dysfunction.  · Severely reduced right ventricular systolic function.  · No thrombus         Past Medical History:   Diagnosis Date    Anticoagulant long-term use     Aspirin    CHF (congestive heart failure)     Hyperlipidemia     Hypertension     NICM (nonischemic cardiomyopathy) 6/16/2020    Obesity        Past Surgical History:   Procedure Laterality Date    INSERTION OF BIVENTRICULAR IMPLANTABLE CARDIOVERTER-DEFIBRILLATOR (ICD) N/A 2/13/2019    Procedure: INSERTION, ICD, BIVENTRICULAR;  Surgeon: Shailesh Eng MD;   Location: [facility] CATH LAB;  Service: Cardiology;  Laterality: N/A;  RN PRE OP 19  1ST CASE PER  RAD[facility]. NOTIFIED RADHA THAT ANESTHESIA IS NOT PITO FOR 1ST CASE START-LO    oral extraction  2018    TESTICLE SURGERY         Review of patient's allergies indicates:  No Known Allergies    No current facility-administered medications on file prior to encounter.      Current Outpatient Medications on File Prior to Encounter   Medication Sig    aspirin 325 MG tablet Take 325 mg by mouth once daily.    carvediloL (COREG) 25 MG tablet Take 1 tablet (25 mg total) by mouth 2 (two) times daily.    pravastatin (PRAVACHOL) 80 MG tablet Take 1 tablet (80 mg total) by mouth once daily.    ramipril (ALTACE) 10 MG capsule Take 1 capsule (10 mg total) by mouth once daily.    spironolactone (ALDACTONE) 25 MG tablet Take 1 tablet (25 mg total) by mouth once daily.    diclofenac sodium (VOLTAREN) 1 % Gel Apply 2 g topically 4 (four) times daily.    furosemide (LASIX) 80 MG tablet Take 1 tablet (80 mg total) by mouth once daily.     Family History     Problem Relation (Age of Onset)    Epilepsy Mother        Tobacco Use    Smoking status: Former Smoker     Packs/day: 0.50     Years: 40.00     Pack years: 20.00     Types: Cigarettes, Cigars     Quit date:      Years since quittin.4    Smokeless tobacco: Never Used   Substance and Sexual Activity    Alcohol use: Yes     Comment: once a month    Drug use: No    Sexual activity: Yes     Birth control/protection: None     Comment: uses protection sometimes     Review of Systems   Constitution: Negative for chills, decreased appetite and diaphoresis.   HENT: Negative for congestion and ear discharge.    Eyes: Negative for blurred vision and discharge.   Cardiovascular: Negative for chest pain, dyspnea on exertion, irregular heartbeat, leg swelling and paroxysmal nocturnal dyspnea.   Respiratory: Negative for cough, hemoptysis and shortness of breath.     Gastrointestinal: Negative for abdominal pain.     Objective:     Vital Signs (Most Recent):  Temp: 98.1 °F (36.7 °C) (06/16/20 1111)  Pulse: 60 (06/16/20 1111)  Resp: 20 (06/16/20 1111)  BP: 135/65 (06/16/20 1111)  SpO2: 97 % (06/16/20 1111) Vital Signs (24h Range):  Temp:  [97.5 °F (36.4 °C)-98.1 °F (36.7 °C)] 98.1 °F (36.7 °C)  Pulse:  [59-69] 60  Resp:  [16-20] 20  SpO2:  [96 %-99 %] 97 %  BP: (109-137)/(58-85) 135/65     Weight: (!) 145.8 kg (321 lb 6.4 oz)  Body mass index is 38.11 kg/m².    SpO2: 97 %  O2 Device (Oxygen Therapy): room air      Intake/Output Summary (Last 24 hours) at 6/16/2020 1129  Last data filed at 6/16/2020 0600  Gross per 24 hour   Intake 240 ml   Output 600 ml   Net -360 ml       Lines/Drains/Airways     Arterial Line                 Arterial Line 02/13/19 1338 488 days          Peripheral Intravenous Line                 Peripheral IV - Single Lumen 06/16/20 0733 20 G Anterior;Proximal;Right Forearm less than 1 day                Physical Exam   Constitutional: He is oriented to person, place, and time. He appears well-developed and well-nourished. No distress.   Eyes: Pupils are equal, round, and reactive to light. Conjunctivae are normal.   Neck: No tracheal deviation present. No thyromegaly present.   Cardiovascular: Normal rate, regular rhythm, normal heart sounds and intact distal pulses. Exam reveals no gallop and no friction rub.   No murmur heard.  Pulses:       Radial pulses are 2+ on the right side and 2+ on the left side.        Femoral pulses are 2+ on the right side and 2+ on the left side.  Wound from draining ICD on L chest   Pulmonary/Chest: Effort normal and breath sounds normal. No respiratory distress. He has no wheezes. He has no rales.   Abdominal: Soft. Bowel sounds are normal. He exhibits no distension. There is no abdominal tenderness.   Musculoskeletal:         General: Edema present. No deformity.   Neurological: He is alert and oriented to person, place,  and time. No cranial nerve deficit. Coordination normal.   Skin: Skin is warm and dry. He is not diaphoretic.   Psychiatric: He has a normal mood and affect. His behavior is normal.       Significant Labs:   BMP:   Recent Labs   Lab 06/16/20  0029         K 3.9      CO2 27   BUN 22   CREATININE 1.4   CALCIUM 9.0   MG 1.9       Significant Imaging: Echocardiogram:   Transthoracic echo (TTE) complete (Cupid Only):   Results for orders placed or performed during the hospital encounter of 11/04/18   Transthoracic echo (TTE) complete (Cupid Only)   Result Value Ref Range    BSA 2.84 m2    Ascending aorta 3.43 cm    STJ 2.46 cm    AV mean gradient 5.10 mmHg    Ao peak nicola 1.43 m/s    Ao VTI 24.82 cm    IVRT 0.09 msec    IVS 1.18 (A) 0.6 - 1.1 cm    LA size 5.85 cm    Left Atrium Major Axis 6.77 cm    Left Atrium Minor Axis 6.36 cm    LVIDD 6.97 (A) 3.5 - 6.0 cm    LVIDS 6.19 (A) 2.1 - 4.0 cm    LVOT diameter 2.40 cm    LVOT peak VTI 15.73 cm    PW 1.15 (A) 0.6 - 1.1 cm    MV Peak A Nicola 0.42 m/s    E wave decelartion time 146.03 msec    MV Peak E Nicola 1.18 m/s    RA Major Axis 5.39 cm    RA Width 4.82 cm    RVDD 5.41 cm    Sinus 3.73 cm    TR Max Nicola 2.44 m/s    TDI LATERAL 0.10     TDI SEPTAL 0.09     LA WIDTH 5.23 cm    Ao root annulus 3.42 cm    AORTIC VALVE CUSP SEPERATION 2.02 cm    PV PEAK VELOCITY 1.09 cm/s    PV peak gradient 4.71 mmHg    MV stenosis pressure 1/2 time 42.35 ms    AV peak gradient 8.22 mmHg    LV Diastolic Volume 253.21 mL    LV Systolic Volume 193.03 mL    LV LATERAL E/E' RATIO 11.80     LV SEPTAL E/E' RATIO 13.11     FS 11 %    QEF 23.77 %    LA volume 170.56 cm3    LV mass 388.10 g    Left Ventricle Relative Wall Thickness 0.33 cm    AV valve area 2.87 cm2    MV valve area p 1/2 method 5.19 cm2    E/A ratio 2.81     Mean e' 0.10     LVOT area 4.52 cm2    LVOT stroke volume 71.12 cm3    E/E' ratio 12.42     LV Systolic Volume Index 68.0 mL/m2    LV Diastolic Volume Index 89.16  mL/m2    LA Volume Index 60.1 mL/m2    LV Mass Index 136.7 g/m2    Triscuspid Valve Regurgitation Peak Gradient 23.81 mmHg    Right Atrial Pressure (from IVC) 8.0 mmHg    TV rest pulmonary artery pressure 31.81 mmHg    Narrative    · Left ventricle ejection fraction is severely decreased at 20%  · The left ventricle cavity is mildly dilated.  · Left ventricle shows eccentric hypertrophy.  · Grade III (severe) left ventricular diastolic dysfunction consistent   with restrictive physiology.  · LA pressure is elevated.  · RV systolic function is severely reduced.  · Left atrium is severely dilated.  · Moderately elevated central venous pressure (8 mm Hg).  · The estimated PA systolic pressure is 31.81 mm Hg        Assessment and Plan:     Infection of biventricular implantable cardioverter-defibrillator (ICD)  1. JASSON for evaluation of ICD extraction  -No absolute contraindications of esophageal stricture, tumor, perforation, laceration,or diverticulum and/or active GI bleed  -The risks, benefits & alternatives of the procedure were explained to the patient.   -The risks of transesophageal echo include but are not limited to:  Dental trauma, esophageal trauma/perforation, bleeding, laryngospasm/brochospasm, aspiration, sore throat/hoarseness, & dislodgement of the endotracheal tube/nasogastric tube (where applicable).    -The risks of moderate sedation include hypotension, respiratory depression, arrhythmias, bronchospasm, & death.    -Informed consent was obtained. The patient is agreeable to proceed with the procedure and all questions and concerns addressed.    Case discussed with an attending in echocardiography lab.     Further recommendations per attending addendum        VTE Risk Mitigation (From admission, onward)         Ordered     heparin (porcine) injection 5,000 Units  Every 8 hours      06/15/20 2354     IP VTE HIGH RISK PATIENT  Once      06/15/20 2354     Place sequential compression device  Until  discontinued      06/15/20 1637                Thank you for your consult. I will follow-up with patient. Please contact us if you have any additional questions.    Morales Dickerson MD  Cardiology   [facility]

## 2020-06-16 NOTE — CONSULTS
[facility]  Cardiac Electrophysiology  Consult Note    Admission Date: 6/15/2020  Code Status: Full Code   Attending Provider: Carlos Medrano  Consulting Provider: Shelton Montana MD  Principal Problem:Erosion of pacemaker pocket due to and not concurrent with implantation of cardiac pacemaker    Inpatient consult to Electrophysiology  Consult performed by: Shelton Montana MD  Consult ordered by: Neetu Diaz, DNP, NP        Subjective:     Chief Complaint:  Pocket infection     HPI:   Mr. Bhakta is a 65-year-old male with a past medical history significant for hypertension, hyperlipidemia, nonischemic cardiomyopathy status post Bi-V ICD 02/13/2013 [employer] with Dr. Shailesh Eng  EF 10%.  He notes increased drainage from his pocket site over the past 1 week.  Yesterday morning he was performing yardd work and noticed pain in his left pectoral region with acute dehiscence of his pocket.  This is the 1st issues with pocket that he has had the past. Denies fever, chills. Nausea. Vomiting. chest pain. or shortness of breath.  No issues of syncope.  No ICD shocks.     Discussed with patient today need for complete device extraction due to pocket infection.  Blood cultures are pending.    EF 10%, severely reduced RV dysfunction.    Past Medical History:   Diagnosis Date    Anticoagulant long-term use     Aspirin    CHF (congestive heart failure)     Hyperlipidemia     Hypertension     NICM (nonischemic cardiomyopathy) 6/16/2020    Obesity        Past Surgical History:   Procedure Laterality Date    INSERTION OF BIVENTRICULAR IMPLANTABLE CARDIOVERTER-DEFIBRILLATOR (ICD) N/A 2/13/2019    Procedure: INSERTION, ICD, BIVENTRICULAR;  Surgeon: Shailesh Eng MD;  Location: [facility] CATH LAB;  Service: Cardiology;  Laterality: N/A;  RN PRE OP 2-6-19  1ST CASE PER  RADHA. NOTIFIED RADHA THAT ANESTHESIA IS NOT PITO FOR 1ST CASE START-LO    oral extraction  11/2018    TESTICLE SURGERY         Review  of patient's allergies indicates:  No Known Allergies    No current facility-administered medications on file prior to encounter.      Current Outpatient Medications on File Prior to Encounter   Medication Sig    aspirin 325 MG tablet Take 325 mg by mouth once daily.    carvediloL (COREG) 25 MG tablet Take 1 tablet (25 mg total) by mouth 2 (two) times daily.    pravastatin (PRAVACHOL) 80 MG tablet Take 1 tablet (80 mg total) by mouth once daily.    ramipril (ALTACE) 10 MG capsule Take 1 capsule (10 mg total) by mouth once daily.    spironolactone (ALDACTONE) 25 MG tablet Take 1 tablet (25 mg total) by mouth once daily.    diclofenac sodium (VOLTAREN) 1 % Gel Apply 2 g topically 4 (four) times daily.    furosemide (LASIX) 80 MG tablet Take 1 tablet (80 mg total) by mouth once daily.     Family History     Problem Relation (Age of Onset)    Epilepsy Mother        Tobacco Use    Smoking status: Former Smoker     Packs/day: 0.50     Years: 40.00     Pack years: 20.00     Types: Cigarettes, Cigars     Quit date:      Years since quittin.4    Smokeless tobacco: Never Used   Substance and Sexual Activity    Alcohol use: Yes     Comment: once a month    Drug use: No    Sexual activity: Yes     Birth control/protection: None     Comment: uses protection sometimes     Review of Systems   Constitution: Negative for chills.   HENT: Negative for congestion.    Eyes: Negative for blurred vision.   Cardiovascular: Negative for chest pain, leg swelling, near-syncope, palpitations and syncope.   Respiratory: Negative for cough and shortness of breath.    Endocrine: Negative for polyuria.   Skin: Negative for itching and rash.   Musculoskeletal: Negative for back pain.   Gastrointestinal: Negative for abdominal pain, constipation, diarrhea, nausea and vomiting.   Genitourinary: Negative for dysuria.   Neurological: Negative for dizziness, headaches and light-headedness.   Psychiatric/Behavioral: Negative for  altered mental status.     Objective:     Vital Signs (Most Recent):  Temp: 97.9 °F (36.6 °C) (06/16/20 0737)  Pulse: 60 (06/16/20 0737)  Resp: 17 (06/16/20 0737)  BP: 120/81 (06/16/20 0737)  SpO2: 97 % (06/16/20 0737) Vital Signs (24h Range):  Temp:  [97.5 °F (36.4 °C)-98 °F (36.7 °C)] 97.9 °F (36.6 °C)  Pulse:  [59-69] 60  Resp:  [16-18] 17  SpO2:  [96 %-99 %] 97 %  BP: (109-137)/(58-85) 120/81       Weight: (!) 145.8 kg (321 lb 6.4 oz)  Body mass index is 38.11 kg/m².    SpO2: 97 %  O2 Device (Oxygen Therapy): room air    Physical Exam   Constitutional: He is oriented to person, place, and time. He appears well-developed and well-nourished.   HENT:   Head: Normocephalic and atraumatic.   Eyes: Pupils are equal, round, and reactive to light. Conjunctivae are normal.   Neck: Normal range of motion. Neck supple.   Cardiovascular: Normal rate, regular rhythm, S1 normal, S2 normal and normal heart sounds. Exam reveals no gallop and no friction rub.   No murmur heard.  Pulses:       Radial pulses are 2+ on the right side and 2+ on the left side.   Pulmonary/Chest: Effort normal and breath sounds normal. No respiratory distress. He has no wheezes. He has no rales. He exhibits no tenderness.   Abdominal: Soft. Bowel sounds are normal. He exhibits no distension and no mass. There is no abdominal tenderness. There is no rebound and no guarding.   Musculoskeletal:         General: No edema.   Neurological: He is alert and oriented to person, place, and time.   Skin: Skin is warm and dry.   Acute dehiscence at left pectoral pocket site. Exposed can and wire.    Psychiatric: He has a normal mood and affect.       Significant Labs:   EP:   Recent Labs   Lab 06/16/20  0029 06/16/20 0745     --    K 3.9  --      --    CO2 27  --      --    BUN 22  --    CREATININE 1.4  --    CALCIUM 9.0  --    PROT 7.1  --    ALBUMIN 3.4*  --    BILITOT 0.6  --    ALKPHOS 61  --    AST 16  --    ALT 9*  --    ANIONGAP 10  --     ESTGFRAFRICA >60.0  --    EGFRNONAA 52.4*  --    WBC 5.90  --    HGB 12.0*  --    HCT 41.2  --      --    INR  --  1.1   , Blood Culture:   Recent Labs   Lab 06/16/20  0031   LABBLOO No Growth to date  No Growth to date   , BMP:   Recent Labs   Lab 06/16/20  0029         K 3.9      CO2 27   BUN 22   CREATININE 1.4   CALCIUM 9.0   MG 1.9   , CMP:   Recent Labs   Lab 06/16/20  0029      K 3.9      CO2 27      BUN 22   CREATININE 1.4   CALCIUM 9.0   PROT 7.1   ALBUMIN 3.4*   BILITOT 0.6   ALKPHOS 61   AST 16   ALT 9*   ANIONGAP 10   ESTGFRAFRICA >60.0   EGFRNONAA 52.4*   , CBC:   Recent Labs   Lab 06/16/20  0029   WBC 5.90   HGB 12.0*   HCT 41.2      , INR:   Recent Labs   Lab 06/16/20  0745   INR 1.1   , Lipid Panel No results for input(s): CHOL, HDL, LDLCALC, TRIG, CHOLHDL in the last 48 hours. and All pertinent lab results from the last 24 hours have been reviewed.    Significant Imaging: CT scan: CT ABDOMEN PELVIS WITH CONTRAST: No results found for this visit on 06/15/20. and CT ABDOMEN PELVIS WITHOUT CONTRAST: No results found for this visit on 06/15/20., Echocardiogram:   2D echo with color flow doppler:   Results for orders placed or performed during the hospital encounter of 01/24/18   2D echo with color flow doppler   Result Value Ref Range    QEF 15 (A) 55 - 65    Mitral Valve Regurgitation MILD     Diastolic Dysfunction Yes (A)     Est. PA Systolic Pressure 26.81     Pericardial Effusion TRIVIAL     Narrative    Date of Procedure: 01/24/2018        TEST DESCRIPTION       Aorta: The aortic root is normal in size, measuring 2.7 cm at sinotubular junction and 3.6 cm at Sinuses of Valsalva. The proximal ascending aorta is normal in size, measuring 3.0 cm across.     Left Atrium: The left atrial volume index is severely enlarged, measuring 53.77 cc/m2.     Left Ventricle: The left ventricle is severely enlarged, with an end-diastolic diameter of 7.0 cm, and  an end-systolic diameter of 6.0 cm. LV wall thickness is normal, with the septum measuring 1.5 cm and the posterior wall measuring 1.1 cm across.   Relative wall thickness was normal at 0.31, and the LV mass index was increased at 197.8 g/m2 consistent with eccentric left ventricular hypertrophy. There are no regional wall motion abnormalities. Left ventricular systolic function appears severely   depressed. Visually estimated ejection fraction is 15-20%. The LV Doppler derived stroke volume equals 70.0 ccs.     Diastolic indices: E wave velocity 1.2 m/s, E/A ratio 3.9,  msec., E/e' ratio(avg) 14. There is diastolic dysfunction secondary to restrictive abnormality.     Right Atrium: The right atrium is enlarged, measuring 5.1 cm in length and 4.4 cm in width in the apical view.     Right Ventricle: The right ventricle is moderately enlarged measuring 4.8 cm at the base in the apical right ventricle-focused view. Global right ventricular systolic function appears normal. Tricuspid annular plane systolic excursion (TAPSE) is 1.9 cm.   Tissue Doppler-derived tricuspid annular peak systolic velocity (S prime) is 12.0 cm/s. The estimated PA systolic pressure is 27 mmHg.     Mitral Valve:  There is mild mitral regurgitation.     Pulmonary Valve:  There is mild pulmonic regurgitation.     Pericardium: There is evidence of a trivial posterior pericardial effusion.     IVC: IVC is normal in size and collapses > 50% with a sniff, suggesting normal right atrial pressure of 3 mmHg.     Intracavitary: There is no evidence of intracavity mass, thrombi, or vegetation.         CONCLUSIONS     1 - Severely depressed left ventricular systolic function (EF 15-20%).     2 - Eccentric hypertrophy.     3 - Severe left ventricular enlargement.     4 - Biatrial enlargement.     5 - Restrictive LV filling pattern, indicating markedly elevated LAP (grade 3 diastolic dysfunction).     6 - Right ventricular enlargement with normal  systolic function.     7 - Mild mitral regurgitation.     8 - Mild pulmonic regurgitation.     9 - Trivial pericardial effusion.             This document has been electronically    SIGNED BY: Ron Kaplan MD On: 01/24/2018 17:01    and Transthoracic echo (TTE) complete (Cupid Only):   Results for orders placed or performed during the hospital encounter of 11/04/18   Transthoracic echo (TTE) complete (Cupid Only)   Result Value Ref Range    BSA 2.84 m2    Ascending aorta 3.43 cm    STJ 2.46 cm    AV mean gradient 5.10 mmHg    Ao peak nicola 1.43 m/s    Ao VTI 24.82 cm    IVRT 0.09 msec    IVS 1.18 (A) 0.6 - 1.1 cm    LA size 5.85 cm    Left Atrium Major Axis 6.77 cm    Left Atrium Minor Axis 6.36 cm    LVIDD 6.97 (A) 3.5 - 6.0 cm    LVIDS 6.19 (A) 2.1 - 4.0 cm    LVOT diameter 2.40 cm    LVOT peak VTI 15.73 cm    PW 1.15 (A) 0.6 - 1.1 cm    MV Peak A Nicola 0.42 m/s    E wave decelartion time 146.03 msec    MV Peak E Nicola 1.18 m/s    RA Major Axis 5.39 cm    RA Width 4.82 cm    RVDD 5.41 cm    Sinus 3.73 cm    TR Max Nicola 2.44 m/s    TDI LATERAL 0.10     TDI SEPTAL 0.09     LA WIDTH 5.23 cm    Ao root annulus 3.42 cm    AORTIC VALVE CUSP SEPERATION 2.02 cm    PV PEAK VELOCITY 1.09 cm/s    PV peak gradient 4.71 mmHg    MV stenosis pressure 1/2 time 42.35 ms    AV peak gradient 8.22 mmHg    LV Diastolic Volume 253.21 mL    LV Systolic Volume 193.03 mL    LV LATERAL E/E' RATIO 11.80     LV SEPTAL E/E' RATIO 13.11     FS 11 %    QEF 23.77 %    LA volume 170.56 cm3    LV mass 388.10 g    Left Ventricle Relative Wall Thickness 0.33 cm    AV valve area 2.87 cm2    MV valve area p 1/2 method 5.19 cm2    E/A ratio 2.81     Mean e' 0.10     LVOT area 4.52 cm2    LVOT stroke volume 71.12 cm3    E/E' ratio 12.42     LV Systolic Volume Index 68.0 mL/m2    LV Diastolic Volume Index 89.16 mL/m2    LA Volume Index 60.1 mL/m2    LV Mass Index 136.7 g/m2    Triscuspid Valve Regurgitation Peak Gradient 23.81 mmHg    Right Atrial Pressure  (from IVC) 8.0 mmHg    TV rest pulmonary artery pressure 31.81 mmHg    Narrative    · Left ventricle ejection fraction is severely decreased at 20%  · The left ventricle cavity is mildly dilated.  · Left ventricle shows eccentric hypertrophy.  · Grade III (severe) left ventricular diastolic dysfunction consistent   with restrictive physiology.  · LA pressure is elevated.  · RV systolic function is severely reduced.  · Left atrium is severely dilated.  · Moderately elevated central venous pressure (8 mm Hg).  · The estimated PA systolic pressure is 31.81 mm Hg       and X-Ray: CXR: X-Ray Chest 1 View (CXR): No results found for this visit on 06/15/20. and X-Ray Chest PA and Lateral (CXR): No results found for this visit on 06/15/20. and KUB: X-Ray Abdomen AP 1 View (KUB): No results found for this visit on 06/15/20.              Assessment and Plan:     Infection of biventricular implantable cardioverter-defibrillator (ICD)  Presented with acute dehiscence of left-sided Bi V ICD pocket infection. Original implant Bi-V ICD - [employer] 2/13/19- [place]    Plan:  Blood cultures are pending  Recommend ID consult for consideration for duration of IV antibiotics  No fever or white count.  ESR is mildly elevated.  Plan on complete device extraction on 06/17/2020 with Dr. Kwong  Device interrogation today to determine pacing dependence to see if the temporary pacing wires indicated  Patient will require LifeVest prior to discharge.  After extraction and completion of antibiotic therapy, will have patient follow up in clinic for consideration of right-sided Bi-V ICD implantation for primary prevention            Thank you for your consult. I will follow-up with patient. Please contact us if you have any additional questions.    Shelton Montana MD  Cardiac Electrophysiology  [facility]

## 2020-06-16 NOTE — SUBJECTIVE & OBJECTIVE
Past Medical History:   Diagnosis Date    Anticoagulant long-term use     Aspirin    CHF (congestive heart failure)     Hyperlipidemia     Hypertension     NICM (nonischemic cardiomyopathy) 2020    Obesity        Past Surgical History:   Procedure Laterality Date    INSERTION OF BIVENTRICULAR IMPLANTABLE CARDIOVERTER-DEFIBRILLATOR (ICD) N/A 2019    Procedure: INSERTION, ICD, BIVENTRICULAR;  Surgeon: Shailesh Eng MD;  Location: [facility] CATH LAB;  Service: Cardiology;  Laterality: N/A;  RN PRE OP 19  1ST CASE PER  RADHA. NOTIFIED RADHA THAT ANESTHESIA IS NOT PITO FOR 1ST CASE START-LO    oral extraction  2018    TESTICLE SURGERY         Review of patient's allergies indicates:  No Known Allergies    No current facility-administered medications on file prior to encounter.      Current Outpatient Medications on File Prior to Encounter   Medication Sig    aspirin 325 MG tablet Take 325 mg by mouth once daily.    carvediloL (COREG) 25 MG tablet Take 1 tablet (25 mg total) by mouth 2 (two) times daily.    pravastatin (PRAVACHOL) 80 MG tablet Take 1 tablet (80 mg total) by mouth once daily.    ramipril (ALTACE) 10 MG capsule Take 1 capsule (10 mg total) by mouth once daily.    spironolactone (ALDACTONE) 25 MG tablet Take 1 tablet (25 mg total) by mouth once daily.    diclofenac sodium (VOLTAREN) 1 % Gel Apply 2 g topically 4 (four) times daily.    furosemide (LASIX) 80 MG tablet Take 1 tablet (80 mg total) by mouth once daily.     Family History     Problem Relation (Age of Onset)    Epilepsy Mother        Tobacco Use    Smoking status: Former Smoker     Packs/day: 0.50     Years: 40.00     Pack years: 20.00     Types: Cigarettes, Cigars     Quit date:      Years since quittin.4    Smokeless tobacco: Never Used   Substance and Sexual Activity    Alcohol use: Yes     Comment: once a month    Drug use: No    Sexual activity: Yes     Birth control/protection: None     Comment:  uses protection sometimes     Review of Systems   Constitution: Negative for chills.   HENT: Negative for congestion.    Eyes: Negative for blurred vision.   Cardiovascular: Negative for chest pain, leg swelling, near-syncope, palpitations and syncope.   Respiratory: Negative for cough and shortness of breath.    Endocrine: Negative for polyuria.   Skin: Negative for itching and rash.   Musculoskeletal: Negative for back pain.   Gastrointestinal: Negative for abdominal pain, constipation, diarrhea, nausea and vomiting.   Genitourinary: Negative for dysuria.   Neurological: Negative for dizziness, headaches and light-headedness.   Psychiatric/Behavioral: Negative for altered mental status.     Objective:     Vital Signs (Most Recent):  Temp: 97.9 °F (36.6 °C) (06/16/20 0737)  Pulse: 60 (06/16/20 0737)  Resp: 17 (06/16/20 0737)  BP: 120/81 (06/16/20 0737)  SpO2: 97 % (06/16/20 0737) Vital Signs (24h Range):  Temp:  [97.5 °F (36.4 °C)-98 °F (36.7 °C)] 97.9 °F (36.6 °C)  Pulse:  [59-69] 60  Resp:  [16-18] 17  SpO2:  [96 %-99 %] 97 %  BP: (109-137)/(58-85) 120/81       Weight: (!) 145.8 kg (321 lb 6.4 oz)  Body mass index is 38.11 kg/m².    SpO2: 97 %  O2 Device (Oxygen Therapy): room air    Physical Exam   Constitutional: He is oriented to person, place, and time. He appears well-developed and well-nourished.   HENT:   Head: Normocephalic and atraumatic.   Eyes: Pupils are equal, round, and reactive to light. Conjunctivae are normal.   Neck: Normal range of motion. Neck supple.   Cardiovascular: Normal rate, regular rhythm, S1 normal, S2 normal and normal heart sounds. Exam reveals no gallop and no friction rub.   No murmur heard.  Pulses:       Radial pulses are 2+ on the right side and 2+ on the left side.   Pulmonary/Chest: Effort normal and breath sounds normal. No respiratory distress. He has no wheezes. He has no rales. He exhibits no tenderness.   Abdominal: Soft. Bowel sounds are normal. He exhibits no  distension and no mass. There is no abdominal tenderness. There is no rebound and no guarding.   Musculoskeletal:         General: No edema.   Neurological: He is alert and oriented to person, place, and time.   Skin: Skin is warm and dry.   Acute dehiscence at left pectoral pocket site. Exposed can and wire.    Psychiatric: He has a normal mood and affect.       Significant Labs:   EP:   Recent Labs   Lab 06/16/20  0029 06/16/20  0745     --    K 3.9  --      --    CO2 27  --      --    BUN 22  --    CREATININE 1.4  --    CALCIUM 9.0  --    PROT 7.1  --    ALBUMIN 3.4*  --    BILITOT 0.6  --    ALKPHOS 61  --    AST 16  --    ALT 9*  --    ANIONGAP 10  --    ESTGFRAFRICA >60.0  --    EGFRNONAA 52.4*  --    WBC 5.90  --    HGB 12.0*  --    HCT 41.2  --      --    INR  --  1.1   , Blood Culture:   Recent Labs   Lab 06/16/20  0031   LABBLOO No Growth to date  No Growth to date   , BMP:   Recent Labs   Lab 06/16/20  0029         K 3.9      CO2 27   BUN 22   CREATININE 1.4   CALCIUM 9.0   MG 1.9   , CMP:   Recent Labs   Lab 06/16/20  0029      K 3.9      CO2 27      BUN 22   CREATININE 1.4   CALCIUM 9.0   PROT 7.1   ALBUMIN 3.4*   BILITOT 0.6   ALKPHOS 61   AST 16   ALT 9*   ANIONGAP 10   ESTGFRAFRICA >60.0   EGFRNONAA 52.4*   , CBC:   Recent Labs   Lab 06/16/20  0029   WBC 5.90   HGB 12.0*   HCT 41.2      , INR:   Recent Labs   Lab 06/16/20  0745   INR 1.1   , Lipid Panel No results for input(s): CHOL, HDL, LDLCALC, TRIG, CHOLHDL in the last 48 hours. and All pertinent lab results from the last 24 hours have been reviewed.    Significant Imaging: CT scan: CT ABDOMEN PELVIS WITH CONTRAST: No results found for this visit on 06/15/20. and CT ABDOMEN PELVIS WITHOUT CONTRAST: No results found for this visit on 06/15/20., Echocardiogram:   2D echo with color flow doppler:   Results for orders placed or performed during the hospital encounter of 01/24/18    2D echo with color flow doppler   Result Value Ref Range    QEF 15 (A) 55 - 65    Mitral Valve Regurgitation MILD     Diastolic Dysfunction Yes (A)     Est. PA Systolic Pressure 26.81     Pericardial Effusion TRIVIAL     Narrative    Date of Procedure: 01/24/2018        TEST DESCRIPTION       Aorta: The aortic root is normal in size, measuring 2.7 cm at sinotubular junction and 3.6 cm at Sinuses of Valsalva. The proximal ascending aorta is normal in size, measuring 3.0 cm across.     Left Atrium: The left atrial volume index is severely enlarged, measuring 53.77 cc/m2.     Left Ventricle: The left ventricle is severely enlarged, with an end-diastolic diameter of 7.0 cm, and an end-systolic diameter of 6.0 cm. LV wall thickness is normal, with the septum measuring 1.5 cm and the posterior wall measuring 1.1 cm across.   Relative wall thickness was normal at 0.31, and the LV mass index was increased at 197.8 g/m2 consistent with eccentric left ventricular hypertrophy. There are no regional wall motion abnormalities. Left ventricular systolic function appears severely   depressed. Visually estimated ejection fraction is 15-20%. The LV Doppler derived stroke volume equals 70.0 ccs.     Diastolic indices: E wave velocity 1.2 m/s, E/A ratio 3.9,  msec., E/e' ratio(avg) 14. There is diastolic dysfunction secondary to restrictive abnormality.     Right Atrium: The right atrium is enlarged, measuring 5.1 cm in length and 4.4 cm in width in the apical view.     Right Ventricle: The right ventricle is moderately enlarged measuring 4.8 cm at the base in the apical right ventricle-focused view. Global right ventricular systolic function appears normal. Tricuspid annular plane systolic excursion (TAPSE) is 1.9 cm.   Tissue Doppler-derived tricuspid annular peak systolic velocity (S prime) is 12.0 cm/s. The estimated PA systolic pressure is 27 mmHg.     Mitral Valve:  There is mild mitral regurgitation.     Pulmonary  Valve:  There is mild pulmonic regurgitation.     Pericardium: There is evidence of a trivial posterior pericardial effusion.     IVC: IVC is normal in size and collapses > 50% with a sniff, suggesting normal right atrial pressure of 3 mmHg.     Intracavitary: There is no evidence of intracavity mass, thrombi, or vegetation.         CONCLUSIONS     1 - Severely depressed left ventricular systolic function (EF 15-20%).     2 - Eccentric hypertrophy.     3 - Severe left ventricular enlargement.     4 - Biatrial enlargement.     5 - Restrictive LV filling pattern, indicating markedly elevated LAP (grade 3 diastolic dysfunction).     6 - Right ventricular enlargement with normal systolic function.     7 - Mild mitral regurgitation.     8 - Mild pulmonic regurgitation.     9 - Trivial pericardial effusion.             This document has been electronically    SIGNED BY: Ron Kaplan MD On: 01/24/2018 17:01    and Transthoracic echo (TTE) complete (Cupid Only):   Results for orders placed or performed during the hospital encounter of 11/04/18   Transthoracic echo (TTE) complete (Cupid Only)   Result Value Ref Range    BSA 2.84 m2    Ascending aorta 3.43 cm    STJ 2.46 cm    AV mean gradient 5.10 mmHg    Ao peak nicola 1.43 m/s    Ao VTI 24.82 cm    IVRT 0.09 msec    IVS 1.18 (A) 0.6 - 1.1 cm    LA size 5.85 cm    Left Atrium Major Axis 6.77 cm    Left Atrium Minor Axis 6.36 cm    LVIDD 6.97 (A) 3.5 - 6.0 cm    LVIDS 6.19 (A) 2.1 - 4.0 cm    LVOT diameter 2.40 cm    LVOT peak VTI 15.73 cm    PW 1.15 (A) 0.6 - 1.1 cm    MV Peak A Nicola 0.42 m/s    E wave decelartion time 146.03 msec    MV Peak E Nicola 1.18 m/s    RA Major Axis 5.39 cm    RA Width 4.82 cm    RVDD 5.41 cm    Sinus 3.73 cm    TR Max Nicola 2.44 m/s    TDI LATERAL 0.10     TDI SEPTAL 0.09     LA WIDTH 5.23 cm    Ao root annulus 3.42 cm    AORTIC VALVE CUSP SEPERATION 2.02 cm    PV PEAK VELOCITY 1.09 cm/s    PV peak gradient 4.71 mmHg    MV stenosis pressure 1/2 time  42.35 ms    AV peak gradient 8.22 mmHg    LV Diastolic Volume 253.21 mL    LV Systolic Volume 193.03 mL    LV LATERAL E/E' RATIO 11.80     LV SEPTAL E/E' RATIO 13.11     FS 11 %    QEF 23.77 %    LA volume 170.56 cm3    LV mass 388.10 g    Left Ventricle Relative Wall Thickness 0.33 cm    AV valve area 2.87 cm2    MV valve area p 1/2 method 5.19 cm2    E/A ratio 2.81     Mean e' 0.10     LVOT area 4.52 cm2    LVOT stroke volume 71.12 cm3    E/E' ratio 12.42     LV Systolic Volume Index 68.0 mL/m2    LV Diastolic Volume Index 89.16 mL/m2    LA Volume Index 60.1 mL/m2    LV Mass Index 136.7 g/m2    Triscuspid Valve Regurgitation Peak Gradient 23.81 mmHg    Right Atrial Pressure (from IVC) 8.0 mmHg    TV rest pulmonary artery pressure 31.81 mmHg    Narrative    · Left ventricle ejection fraction is severely decreased at 20%  · The left ventricle cavity is mildly dilated.  · Left ventricle shows eccentric hypertrophy.  · Grade III (severe) left ventricular diastolic dysfunction consistent   with restrictive physiology.  · LA pressure is elevated.  · RV systolic function is severely reduced.  · Left atrium is severely dilated.  · Moderately elevated central venous pressure (8 mm Hg).  · The estimated PA systolic pressure is 31.81 mm Hg       and X-Ray: CXR: X-Ray Chest 1 View (CXR): No results found for this visit on 06/15/20. and X-Ray Chest PA and Lateral (CXR): No results found for this visit on 06/15/20. and KUB: X-Ray Abdomen AP 1 View (KUB): No results found for this visit on 06/15/20.

## 2020-06-16 NOTE — HPI
"Papito Bhakta is a 65 y.o. male with a significant medical history of CHF, HLD, HTN, s/p AICD placement who presents to the hospital as a transfer from [facility] for evaluation of AICD/pacemaker protrusion through skin.  The patient was in his usual state of health and states he had been cutting grass earlier in the day when he noted a "sticking" sensation near his pacemaker.  He subsequently took his shirt off and noted the device protruding through his skin prompting him to present to the OSH for evaluation.  The patient denies any pain, numbness, weakness, fever, CP, SOB, or device discharges.  He did not report anything that exacerbated or alleviated his symptom.  He was transferred to [facility] for higher level of care.  AICD device was easily seen protruding through the skin of the patient's left chest wall.  No drainage or erythema noted.  The patient was admitted to Hospital Medicine.    Interval History:  ID consulted for ABX recs.  Patient has been afebrile and WBC WNL.  CRP WNL and procalcitonin 0.04. Blood cultures NGTD.  The patient denies any recent fever, chills, or sweats.  He is to undergo removal 6/17.  He is not currently on abx.         "

## 2020-06-16 NOTE — ANESTHESIA PREPROCEDURE EVALUATION
[facility]  Anesthesia Pre-Operative Evaluation         Patient Name: Papito Bhakta  YOB: 1955  MRN: [id number]    SUBJECTIVE:     Pre-operative evaluation for Procedure(s) (LRB):  EXTRACTION, ELECTRODE LEAD (Left)  ECHOCARDIOGRAM,TRANSESOPHAGEAL (N/A)     06/16/2020    Papito Bhakta is a 65 y.o. male w/ a significant PMHx of hypertension, hyperlipidemia, nonischemic cardiomyopathy status post Bi-V ICD 02/13/2013 [employer] with Dr. Shailesh Eng  EF 10%. Now with increased drainage from pocket site.     · Limited study to assess function  · Severely decreased left ventricular systolic function. The estimated ejection fraction is 10%  · Left ventricular diastolic dysfunction.  · Severely reduced right ventricular systolic function.  · No thrombus    Patient now presents for the above procedure(s).      LDA: None documented.       Peripheral IV - Single Lumen 06/16/20 0733 20 G Anterior;Proximal;Right Forearm (Active)   Site Assessment Clean;No redness;Intact;Dry;No swelling 06/16/20 0758   Line Status Saline locked 06/16/20 0758   Dressing Status Clean;Dry;Intact 06/16/20 0758   Dressing Intervention First dressing 06/16/20 0758   Dressing Change Due 06/19/20 06/16/20 0758   Site Change Due 06/19/20 06/16/20 0758   Reason Not Rotated Not due 06/16/20 0758   Number of days: 0            Arterial Line 02/13/19 1338 (Active)   Number of days: 488       Prev airway:     Placement Date: 02/13/19; Placement Time: 1150; Method of Intubation: Direct laryngoscopy; Inserted by: CRNA; Airway Device: Endotracheal Tube-Hi/Lo; Mask Ventilation: Easy - oral; Intubated: Postinduction; Blade: Meehan #2; Airway Device Size: 8.0; Style: Cuffed; Cuff Inflation: Minimal occlusive pressure; Placement Verified By: Auscultation, Capnometry, ETT Condensation; Grade: Grade I; Complicating Factors: None; Intubation Findings: Positive EtCO2, Bilateral breath sounds, Atraumatic/Condition of teeth unchanged;   Depth of Insertion: 21; Securment: Lips; Complications: None; Breath Sounds: Equal Bilateral; Insertion Attempts: 1; Removal Date: 02/13/19;  Removal Time: 1316      Drips: None documented.      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      Current Outpatient Medications on File Prior to Encounter   Medication Sig Dispense Refill    aspirin 325 MG tablet Take 325 mg by mouth once daily.      carvediloL (COREG) 25 MG tablet Take 1 tablet (25 mg total) by mouth 2 (two) times daily. 180 tablet 3    pravastatin (PRAVACHOL) 80 MG tablet Take 1 tablet (80 mg total) by mouth once daily. 90 tablet 3    ramipril (ALTACE) 10 MG capsule Take 1 capsule (10 mg total) by mouth once daily. 90 capsule 3    spironolactone (ALDACTONE) 25 MG tablet Take 1 tablet (25 mg total) by mouth once daily. 90 tablet 3    diclofenac sodium (VOLTAREN) 1 % Gel Apply 2 g topically 4 (four) times  daily. 100 g 1    furosemide (LASIX) 80 MG tablet Take 1 tablet (80 mg total) by mouth once daily. 90 tablet 3       Past Surgical History:   Procedure Laterality Date    INSERTION OF BIVENTRICULAR IMPLANTABLE CARDIOVERTER-DEFIBRILLATOR (ICD) N/A 2019    Procedure: INSERTION, ICD, BIVENTRICULAR;  Surgeon: Shailesh Eng MD;  Location: [facility] CATH LAB;  Service: Cardiology;  Laterality: N/A;  RN PRE OP 19  1ST CASE PER  RADHA. NOTIFIED RADHA THAT ANESTHESIA IS NOT PITO FOR 1ST CASE START-LO    oral extraction  2018    TESTICLE SURGERY         Social History     Socioeconomic History    Marital status:      Spouse name: Not on file    Number of children: Not on file    Years of education: Not on file    Highest education level: Not on file   Occupational History    Not on file   Social Needs    Financial resource strain: Not on file    Food insecurity     Worry: Not on file     Inability: Not on file    Transportation needs     Medical: Not on file     Non-medical: Not on file   Tobacco Use    Smoking status: Former Smoker     Packs/day: 0.50     Years: 40.00     Pack years: 20.00     Types: Cigarettes, Cigars     Quit date:      Years since quittin.4    Smokeless tobacco: Never Used   Substance and Sexual Activity    Alcohol use: Yes     Comment: once a month    Drug use: No    Sexual activity: Yes     Birth control/protection: None     Comment: uses protection sometimes   Lifestyle    Physical activity     Days per week: Not on file     Minutes per session: Not on file    Stress: Not on file   Relationships    Social connections     Talks on phone: Not on file     Gets together: Not on file     Attends Yazdanism service: Not on file     Active member of club or organization: Not on file     Attends meetings of clubs or organizations: Not on file     Relationship status: Not on file   Other Topics Concern    Not on file   Social History Narrative    Not on file        OBJECTIVE:     Vital Signs Range (Last 24H):  Temp:  [36.4 °C (97.5 °F)-36.7 °C (98.1 °F)]   Pulse:  [59-69]   Resp:  [16-20]   BP: (109-137)/(58-85)   SpO2:  [96 %-99 %]       Significant Labs:  Lab Results   Component Value Date    WBC 5.90 06/16/2020    HGB 12.0 (L) 06/16/2020    HCT 41.2 06/16/2020     06/16/2020    CHOL 160 12/02/2017    TRIG 106 12/02/2017    HDL 46 12/02/2017    ALT 9 (L) 06/16/2020    AST 16 06/16/2020     06/16/2020    K 3.9 06/16/2020     06/16/2020    CREATININE 1.4 06/16/2020    BUN 22 06/16/2020    CO2 27 06/16/2020    INR 1.1 06/16/2020       Diagnostic Studies: No relevant studies.    EKG:   Results for orders placed or performed during the hospital encounter of 06/15/20   EKG 12-lead    Collection Time: 06/16/20  7:04 AM    Narrative    Test Reason : I42.8,    Vent. Rate : 060 BPM     Atrial Rate : 060 BPM     P-R Int : 238 ms          QRS Dur : 134 ms      QT Int : 456 ms       P-R-T Axes : 005 014 020 degrees     QTc Int : 456 ms    AV dual-paced rhythm with prolonged AV conduction  Abnormal ECG  When compared with ECG of 12-NOV-2019 09:34,  Vent. rate has decreased BY   7 BPM  Confirmed by TANMAY MILLS MD ([id number]) on 6/16/2020 12:55:57 PM    Referred By: [email or web address]   SELF           Confirmed By:TANMAY MILLS MD       2D ECHO:  TTE:  Results for orders placed or performed during the hospital encounter of 11/04/18   Transthoracic echo (TTE) complete (Cupid Only)   Result Value Ref Range    BSA 2.84 m2    Ascending aorta 3.43 cm    STJ 2.46 cm    AV mean gradient 5.10 mmHg    Ao peak nicola 1.43 m/s    Ao VTI 24.82 cm    IVRT 0.09 msec    IVS 1.18 (A) 0.6 - 1.1 cm    LA size 5.85 cm    Left Atrium Major Axis 6.77 cm    Left Atrium Minor Axis 6.36 cm    LVIDD 6.97 (A) 3.5 - 6.0 cm    LVIDS 6.19 (A) 2.1 - 4.0 cm    LVOT diameter 2.40 cm    LVOT peak VTI 15.73 cm    PW 1.15 (A) 0.6 - 1.1 cm    MV Peak A Nicola 0.42 m/s    E wave decelartion time 146.03 msec    MV Peak  E Nicola 1.18 m/s    RA Major Axis 5.39 cm    RA Width 4.82 cm    RVDD 5.41 cm    Sinus 3.73 cm    TR Max Nicola 2.44 m/s    TDI LATERAL 0.10     TDI SEPTAL 0.09     LA WIDTH 5.23 cm    Ao root annulus 3.42 cm    AORTIC VALVE CUSP SEPERATION 2.02 cm    PV PEAK VELOCITY 1.09 cm/s    PV peak gradient 4.71 mmHg    MV stenosis pressure 1/2 time 42.35 ms    AV peak gradient 8.22 mmHg    LV Diastolic Volume 253.21 mL    LV Systolic Volume 193.03 mL    LV LATERAL E/E' RATIO 11.80     LV SEPTAL E/E' RATIO 13.11     FS 11 %    QEF 23.77 %    LA volume 170.56 cm3    LV mass 388.10 g    Left Ventricle Relative Wall Thickness 0.33 cm    AV valve area 2.87 cm2    MV valve area p 1/2 method 5.19 cm2    E/A ratio 2.81     Mean e' 0.10     LVOT area 4.52 cm2    LVOT stroke volume 71.12 cm3    E/E' ratio 12.42     LV Systolic Volume Index 68.0 mL/m2    LV Diastolic Volume Index 89.16 mL/m2    LA Volume Index 60.1 mL/m2    LV Mass Index 136.7 g/m2    Triscuspid Valve Regurgitation Peak Gradient 23.81 mmHg    Right Atrial Pressure (from IVC) 8.0 mmHg    TV rest pulmonary artery pressure 31.81 mmHg    Narrative    · Left ventricle ejection fraction is severely decreased at 20%  · The left ventricle cavity is mildly dilated.  · Left ventricle shows eccentric hypertrophy.  · Grade III (severe) left ventricular diastolic dysfunction consistent   with restrictive physiology.  · LA pressure is elevated.  · RV systolic function is severely reduced.  · Left atrium is severely dilated.  · Moderately elevated central venous pressure (8 mm Hg).  · The estimated PA systolic pressure is 31.81 mm Hg          JASSON:  No results found for this or any previous visit.    ASSESSMENT/PLAN:                                                                                                                  06/16/2020  Papito Bhakta is a 65 y.o., male.    Anesthesia Evaluation    I have reviewed the Patient Summary Reports.    I have reviewed the Nursing Notes.    I  have reviewed the Medications.     Review of Systems  Anesthesia Hx:  No problems with previous Anesthesia Denies Hx of Anesthetic complications  Neg history of prior surgery. Denies Family Hx of Anesthesia complications.   Denies Personal Hx of Anesthesia complications.   Social:  Former Smoker    Hematology/Oncology:  Hematology Normal   Oncology Normal     EENT/Dental:EENT/Dental Normal   Cardiovascular:   Exercise tolerance: good Hypertension CHF hyperlipidemia · Limited study to assess function  · Severely decreased left ventricular systolic function. The estimated ejection fraction is 10%  · Left ventricular diastolic dysfunction.  · Severely reduced right ventricular systolic function.  · No thrombus   Pulmonary:   Pneumonia    Renal/:  Renal/ Normal     Hepatic/GI:  Hepatic/GI Normal    Musculoskeletal:  Musculoskeletal Normal    Neurological:  Neurology Normal    Endocrine:  Endocrine Normal    Dermatological:  Skin Normal    Psych:  Psychiatric Normal           Physical Exam  General:  Well nourished, Obesity    Airway/Jaw/Neck:  Airway Findings: Mouth Opening: Normal Tongue: Normal  General Airway Assessment: Adult  Mallampati: II  TM Distance: Normal, at least 6 cm        Eyes/Ears/Nose:  Eyes/Ears/Nose Findings:    Dental:  Dental Findings: In tactNo upper teeth   Chest/Lungs:  Chest/Lungs Findings: Clear to auscultation, Normal Respiratory Rate     Heart/Vascular:  Heart Findings: Rate: Normal  Rhythm: Regular Rhythm  Heart murmur: negative Vascular Findings:        Mental Status:  Mental Status Findings:  Cooperative, Alert and Oriented         Anesthesia Plan  Type of Anesthesia, risks & benefits discussed:  Anesthesia Type:  general  Patient's Preference: General  Intra-op Monitoring Plan: standard ASA monitors, arterial line and central line  Intra-op Monitoring Plan Comments:   Post Op Pain Control Plan: multimodal analgesia and IV/PO Opioids PRN  Post Op Pain Control Plan Comments:    Induction:   IV  Beta Blocker:  Patient is on a Beta-Blocker and has received one dose within the past 24 hours (No further documentation required).       Informed Consent: Patient understands risks and agrees with Anesthesia plan.  Questions answered. Anesthesia consent signed with patient.  ASA Score: 4     Day of Surgery Review of History & Physical:    H&P update referred to the surgeon.     Anesthesia Plan Notes: Discussed plan for general endotracheal anesthesia, pt understands and agrees with plan        Ready For Surgery From Anesthesia Perspective.

## 2020-06-16 NOTE — PLAN OF CARE
(Physician in Lead of Transfers)   Outside Transfer Acceptance Note / Regional Referral Center      Upon patient arrival to floor, please call extension [phone] (if no answer, this will flip to a beeper, so enter your call back number) for Hospital Medicine admit team assignment and for additional admit orders for the patient.  Do not page the attending physician associated with the patient on arrival (this physician may not be on duty at the time of arrival).  Rather, always call [phone] to reach the triage physician for orders and team assignment.      Transferring Physician: Dr. Retana    Accepting Physician: Akshat Strong MD    Date of Acceptance: 06/15/2020    Transferring Facility: [facility]    Reason for Transfer: needs EP     Report from Transferring Physician/Hospital course: The patient is a 64 y/o male with PMH of NICM, AICD, HTN, and HLP who presents with his AICD lead protruding though the skin of his chest. The case was discussed with Dr. Kwong in EP and hew ill most likely take the patient on Wednesday for evaluation. Patient clinically stable otherwise.         Labs & Radiographs: see EPIC      To Do List:   1) Telemetry  2) EP consult          Akshat Strong MD  Hospital Medicine Staff

## 2020-06-16 NOTE — DISCHARGE INSTRUCTIONS
Go to the emergency room at [facility] on [address] and tell them that you are transfer to the service of Dr. Kwong, for pacemaker that has eroded through the skin.  Please show them this paper.

## 2020-06-16 NOTE — SUBJECTIVE & OBJECTIVE
Past Medical History:   Diagnosis Date    Anticoagulant long-term use     Aspirin    CHF (congestive heart failure)     Hyperlipidemia     Hypertension     Obesity        Past Surgical History:   Procedure Laterality Date    INSERTION OF BIVENTRICULAR IMPLANTABLE CARDIOVERTER-DEFIBRILLATOR (ICD) N/A 2019    Procedure: INSERTION, ICD, BIVENTRICULAR;  Surgeon: Shailesh Eng MD;  Location: [facility] CATH LAB;  Service: Cardiology;  Laterality: N/A;  RN PRE OP 2  1ST CASE PER  [facility]. NOTIFIED RADHA THAT ANESTHESIA IS NOT PITO FOR 1ST CASE START-LO    oral extraction  2018    TESTICLE SURGERY         Review of patient's allergies indicates:  No Known Allergies    No current facility-administered medications on file prior to encounter.      Current Outpatient Medications on File Prior to Encounter   Medication Sig    aspirin 325 MG tablet Take 325 mg by mouth once daily.    carvediloL (COREG) 25 MG tablet Take 1 tablet (25 mg total) by mouth 2 (two) times daily.    pravastatin (PRAVACHOL) 80 MG tablet Take 1 tablet (80 mg total) by mouth once daily.    ramipril (ALTACE) 10 MG capsule Take 1 capsule (10 mg total) by mouth once daily.    spironolactone (ALDACTONE) 25 MG tablet Take 1 tablet (25 mg total) by mouth once daily.    diclofenac sodium (VOLTAREN) 1 % Gel Apply 2 g topically 4 (four) times daily.    furosemide (LASIX) 80 MG tablet Take 1 tablet (80 mg total) by mouth once daily.     Family History     Problem Relation (Age of Onset)    Epilepsy Mother        Tobacco Use    Smoking status: Former Smoker     Packs/day: 0.50     Years: 40.00     Pack years: 20.00     Types: Cigarettes, Cigars     Quit date:      Years since quittin.4    Smokeless tobacco: Never Used   Substance and Sexual Activity    Alcohol use: Yes     Comment: once a month    Drug use: No    Sexual activity: Yes     Birth control/protection: None     Comment: uses protection sometimes     Review of Systems    Constitutional: Negative for chills, fatigue and fever.   HENT: Negative for drooling, facial swelling and trouble swallowing.    Eyes: Negative for photophobia, pain and visual disturbance.   Respiratory: Negative for cough, shortness of breath and wheezing.    Cardiovascular: Positive for leg swelling (baseline, R>L). Negative for chest pain and palpitations.   Gastrointestinal: Negative for abdominal pain, constipation, diarrhea, nausea and vomiting.   Endocrine: Negative for cold intolerance and heat intolerance.   Genitourinary: Negative for dysuria and hematuria.   Musculoskeletal: Negative for neck pain and neck stiffness.   Skin: Positive for wound. Negative for rash.   Allergic/Immunologic: Negative for environmental allergies and food allergies.   Neurological: Negative for dizziness, tremors, light-headedness, numbness and headaches.   Hematological: Negative for adenopathy. Does not bruise/bleed easily.   Psychiatric/Behavioral: Negative for agitation, confusion and hallucinations.     Objective:     Vital Signs (Most Recent):  Temp: 97.7 °F (36.5 °C) (06/16/20 0025)  Pulse: 69 (06/16/20 0025)  Resp: 18 (06/16/20 0025)  BP: 128/72 (06/16/20 0025)  SpO2: 96 % (06/16/20 0025) Vital Signs (24h Range):  Temp:  [97.5 °F (36.4 °C)-98 °F (36.7 °C)] 97.7 °F (36.5 °C)  Pulse:  [59-69] 69  Resp:  [16-18] 18  SpO2:  [96 %-99 %] 96 %  BP: (109-128)/(58-72) 128/72     Weight: (!) 145.8 kg (321 lb 6.4 oz)  Body mass index is 38.11 kg/m².    Physical Exam  Vitals signs and nursing note reviewed.   Constitutional:       General: He is not in acute distress.     Appearance: He is well-developed. He is not diaphoretic.   HENT:      Head: Normocephalic and atraumatic.      Right Ear: External ear normal.      Left Ear: External ear normal.      Nose: Nose normal.      Mouth/Throat:      Pharynx: No oropharyngeal exudate.   Eyes:      General: No scleral icterus.        Right eye: No discharge.         Left eye: No  discharge.      Conjunctiva/sclera: Conjunctivae normal.      Pupils: Pupils are equal, round, and reactive to light.   Neck:      Musculoskeletal: Normal range of motion and neck supple.   Cardiovascular:      Rate and Rhythm: Normal rate and regular rhythm.      Heart sounds: Murmur present.   Pulmonary:      Effort: Pulmonary effort is normal. No respiratory distress.      Breath sounds: Normal breath sounds.   Abdominal:      General: Bowel sounds are normal. There is no distension.      Palpations: Abdomen is soft.      Tenderness: There is no abdominal tenderness.   Musculoskeletal:         General: Swelling (BLE, R>L; 2+pitting R, 1+pitting L) present.   Skin:     General: Skin is warm and dry.      Capillary Refill: Capillary refill takes less than 2 seconds.      Findings: Wound present.          Neurological:      Mental Status: He is alert and oriented to person, place, and time.           CRANIAL NERVES     CN III, IV, VI   Pupils are equal, round, and reactive to light.       Significant Labs: All pertinent labs within the past 24 hours have been reviewed.    Significant Imaging: I have reviewed all pertinent imaging results/findings within the past 24 hours.

## 2020-06-16 NOTE — ASSESSMENT & PLAN NOTE
- ICD infection given erosion through skin  - no surrounding or systemic signs of infection  - no on abx      Plan:  - hold abx as no active signs of infection - though, can start abx for fever or signs of infection  - ICD and lead removal recommended  - during removal, please send cultures of pocket and leads for culture  - immediately post extraction, start empiric vancomycin and ceftriaxone  - will follow

## 2020-06-16 NOTE — H&P
"[facility] Hospital Medicine  History & Physical    Patient Name: Papito Bhakta  MRN: [id number]  Admission Date: 6/15/2020  Attending Physician: Nerissa  Primary Care Provider: Brynn To MD    [facility] Medicine Team: [facility] MED RIK Diaz, LORE, NP     Patient information was obtained from patient, relative(s) and ER records.     Subjective:     Principal Problem:Erosion of pacemaker pocket due to and not concurrent with implantation of cardiac pacemaker    Chief Complaint:   Chief Complaint   Patient presents with    Pacemaker Problem     Pt pacemaker is protruding through pt skin of chest, no wounds, no bleeding. no pain, Pt denies pain or discomfort. Pt denies pacemaker shocking him. Pt was suppose to be seen july,08,2020 to follow up with cardiologist.        HPI: Papito Bhakta is a 65 y.o. male with a significant medical history of CHF, HLD, HTN, s/p AICD placement who presents to the hospital as a transfer from [facility] for evaluation of AICD/pacemaker protrusion through skin.  The patient was in his usual state of health and states he had been cutting grass earlier in the day when he noted a "sticking" sensation near his pacemaker.  He subsequently took his shirt off and noted the device protruding through his skin prompting him to present to the OSH for evaluation.  The patient denies any pain, numbness, weakness, fever, CP, SOB, or device discharges.  He did not report anything that exacerbated or alleviated his symptom.  He was transferred to [facility] for higher level of care.  Currently the patient is AA&Ox3.  He is c/o RLE pain (that he associates w/arthritis) but has no other complaints at this time.  AICD device easily seen protruding through the skin of the patient's left chest wall.  No drainage or erythema.  The patient is admitted to Hospital Medicine.     Past Medical History:   Diagnosis Date    Anticoagulant long-term use     Aspirin    CHF " (congestive heart failure)     Hyperlipidemia     Hypertension     Obesity        Past Surgical History:   Procedure Laterality Date    INSERTION OF BIVENTRICULAR IMPLANTABLE CARDIOVERTER-DEFIBRILLATOR (ICD) N/A 2019    Procedure: INSERTION, ICD, BIVENTRICULAR;  Surgeon: Shailesh Eng MD;  Location: [facility] CATH LAB;  Service: Cardiology;  Laterality: N/A;  RN PRE OP 2-6-19  1ST CASE PER  RADHA. NOTIFIED RADHA THAT ANESTHESIA IS NOT PITO FOR 1ST CASE START-LO    oral extraction  2018    TESTICLE SURGERY         Review of patient's allergies indicates:  No Known Allergies    No current facility-administered medications on file prior to encounter.      Current Outpatient Medications on File Prior to Encounter   Medication Sig    aspirin 325 MG tablet Take 325 mg by mouth once daily.    carvediloL (COREG) 25 MG tablet Take 1 tablet (25 mg total) by mouth 2 (two) times daily.    pravastatin (PRAVACHOL) 80 MG tablet Take 1 tablet (80 mg total) by mouth once daily.    ramipril (ALTACE) 10 MG capsule Take 1 capsule (10 mg total) by mouth once daily.    spironolactone (ALDACTONE) 25 MG tablet Take 1 tablet (25 mg total) by mouth once daily.    diclofenac sodium (VOLTAREN) 1 % Gel Apply 2 g topically 4 (four) times daily.    furosemide (LASIX) 80 MG tablet Take 1 tablet (80 mg total) by mouth once daily.     Family History     Problem Relation (Age of Onset)    Epilepsy Mother        Tobacco Use    Smoking status: Former Smoker     Packs/day: 0.50     Years: 40.00     Pack years: 20.00     Types: Cigarettes, Cigars     Quit date:      Years since quittin.4    Smokeless tobacco: Never Used   Substance and Sexual Activity    Alcohol use: Yes     Comment: once a month    Drug use: No    Sexual activity: Yes     Birth control/protection: None     Comment: uses protection sometimes     Review of Systems   Constitutional: Negative for chills, fatigue and fever.   HENT: Negative for drooling,  facial swelling and trouble swallowing.    Eyes: Negative for photophobia, pain and visual disturbance.   Respiratory: Negative for cough, shortness of breath and wheezing.    Cardiovascular: Positive for leg swelling (baseline, R>L). Negative for chest pain and palpitations.   Gastrointestinal: Negative for abdominal pain, constipation, diarrhea, nausea and vomiting.   Endocrine: Negative for cold intolerance and heat intolerance.   Genitourinary: Negative for dysuria and hematuria.   Musculoskeletal: Negative for neck pain and neck stiffness.   Skin: Positive for wound. Negative for rash.   Allergic/Immunologic: Negative for environmental allergies and food allergies.   Neurological: Negative for dizziness, tremors, light-headedness, numbness and headaches.   Hematological: Negative for adenopathy. Does not bruise/bleed easily.   Psychiatric/Behavioral: Negative for agitation, confusion and hallucinations.     Objective:     Vital Signs (Most Recent):  Temp: 97.7 °F (36.5 °C) (06/16/20 0025)  Pulse: 69 (06/16/20 0025)  Resp: 18 (06/16/20 0025)  BP: 128/72 (06/16/20 0025)  SpO2: 96 % (06/16/20 0025) Vital Signs (24h Range):  Temp:  [97.5 °F (36.4 °C)-98 °F (36.7 °C)] 97.7 °F (36.5 °C)  Pulse:  [59-69] 69  Resp:  [16-18] 18  SpO2:  [96 %-99 %] 96 %  BP: (109-128)/(58-72) 128/72     Weight: (!) 145.8 kg (321 lb 6.4 oz)  Body mass index is 38.11 kg/m².    Physical Exam  Vitals signs and nursing note reviewed.   Constitutional:       General: He is not in acute distress.     Appearance: He is well-developed. He is not diaphoretic.   HENT:      Head: Normocephalic and atraumatic.      Right Ear: External ear normal.      Left Ear: External ear normal.      Nose: Nose normal.      Mouth/Throat:      Pharynx: No oropharyngeal exudate.   Eyes:      General: No scleral icterus.        Right eye: No discharge.         Left eye: No discharge.      Conjunctiva/sclera: Conjunctivae normal.      Pupils: Pupils are equal, round,  and reactive to light.   Neck:      Musculoskeletal: Normal range of motion and neck supple.   Cardiovascular:      Rate and Rhythm: Normal rate and regular rhythm.      Heart sounds: Murmur present.   Pulmonary:      Effort: Pulmonary effort is normal. No respiratory distress.      Breath sounds: Normal breath sounds.   Abdominal:      General: Bowel sounds are normal. There is no distension.      Palpations: Abdomen is soft.      Tenderness: There is no abdominal tenderness.   Musculoskeletal:         General: Swelling (BLE, R>L; 2+pitting R, 1+pitting L) present.   Skin:     General: Skin is warm and dry.      Capillary Refill: Capillary refill takes less than 2 seconds.      Findings: Wound present.          Neurological:      Mental Status: He is alert and oriented to person, place, and time.           CRANIAL NERVES     CN III, IV, VI   Pupils are equal, round, and reactive to light.       Significant Labs: All pertinent labs within the past 24 hours have been reviewed.    Significant Imaging: I have reviewed all pertinent imaging results/findings within the past 24 hours.    Assessment/Plan:     * Erosion of pacemaker pocket due to and not concurrent with implantation of cardiac pacemaker  Papito Bhakta is a 65 y.o. male with a significant medical history of CHF, HLD, HTN, s/p AICD placement who presents to the hospital as a transfer from [facility] for evaluation of AICD/pacemaker protrusion through skin.  The patient reported no prior symptoms, fever, pain, or trauma.    -Consult Electrophysiology for AICD evaluation/stabilization         Chronic combined systolic and diastolic congestive heart failure  -Strict Is&Os  -Continue home meds lasix, coreg, spironolactone      Essential hypertension  -Continue home ramipril       Pacemaker complications, initial encounter  -See erosion of pacemaker pocket for full plan      Hyperlipidemia  -Continue home pravastatin 80 mg daily      VTE Risk Mitigation  (From admission, onward)         Ordered     heparin (porcine) injection 5,000 Units  Every 8 hours      06/15/20 2354     IP VTE HIGH RISK PATIENT  Once      06/15/20 2354     Place sequential compression device  Until discontinued      06/15/20 2354                   Neetu Diaz, LORE, NP  Department of Hospital Medicine   [facility]

## 2020-06-16 NOTE — ED NOTES
Called lab because covid hadn't resulted; Covid is negative; Spoke with LAUREN Aleman at [facility] and gave results

## 2020-06-16 NOTE — SUBJECTIVE & OBJECTIVE
Past Medical History:   Diagnosis Date    Anticoagulant long-term use     Aspirin    CHF (congestive heart failure)     Hyperlipidemia     Hypertension     NICM (nonischemic cardiomyopathy) 2020    Obesity        Past Surgical History:   Procedure Laterality Date    INSERTION OF BIVENTRICULAR IMPLANTABLE CARDIOVERTER-DEFIBRILLATOR (ICD) N/A 2019    Procedure: INSERTION, ICD, BIVENTRICULAR;  Surgeon: Shailesh Eng MD;  Location: [facility] CATH LAB;  Service: Cardiology;  Laterality: N/A;  RN PRE OP 19  1ST CASE PER  [facility]. NOTIFIED RADHA THAT ANESTHESIA IS NOT PITO FOR 1ST CASE START-LO    oral extraction  2018    TESTICLE SURGERY         Review of patient's allergies indicates:  No Known Allergies    No current facility-administered medications on file prior to encounter.      Current Outpatient Medications on File Prior to Encounter   Medication Sig    aspirin 325 MG tablet Take 325 mg by mouth once daily.    carvediloL (COREG) 25 MG tablet Take 1 tablet (25 mg total) by mouth 2 (two) times daily.    pravastatin (PRAVACHOL) 80 MG tablet Take 1 tablet (80 mg total) by mouth once daily.    ramipril (ALTACE) 10 MG capsule Take 1 capsule (10 mg total) by mouth once daily.    spironolactone (ALDACTONE) 25 MG tablet Take 1 tablet (25 mg total) by mouth once daily.    diclofenac sodium (VOLTAREN) 1 % Gel Apply 2 g topically 4 (four) times daily.    furosemide (LASIX) 80 MG tablet Take 1 tablet (80 mg total) by mouth once daily.     Family History     Problem Relation (Age of Onset)    Epilepsy Mother        Tobacco Use    Smoking status: Former Smoker     Packs/day: 0.50     Years: 40.00     Pack years: 20.00     Types: Cigarettes, Cigars     Quit date:      Years since quittin.4    Smokeless tobacco: Never Used   Substance and Sexual Activity    Alcohol use: Yes     Comment: once a month    Drug use: No    Sexual activity: Yes     Birth control/protection: None     Comment:  uses protection sometimes     Review of Systems   Constitution: Negative for chills, decreased appetite and diaphoresis.   HENT: Negative for congestion and ear discharge.    Eyes: Negative for blurred vision and discharge.   Cardiovascular: Negative for chest pain, dyspnea on exertion, irregular heartbeat, leg swelling and paroxysmal nocturnal dyspnea.   Respiratory: Negative for cough, hemoptysis and shortness of breath.    Gastrointestinal: Negative for abdominal pain.     Objective:     Vital Signs (Most Recent):  Temp: 98.1 °F (36.7 °C) (06/16/20 1111)  Pulse: 60 (06/16/20 1111)  Resp: 20 (06/16/20 1111)  BP: 135/65 (06/16/20 1111)  SpO2: 97 % (06/16/20 1111) Vital Signs (24h Range):  Temp:  [97.5 °F (36.4 °C)-98.1 °F (36.7 °C)] 98.1 °F (36.7 °C)  Pulse:  [59-69] 60  Resp:  [16-20] 20  SpO2:  [96 %-99 %] 97 %  BP: (109-137)/(58-85) 135/65     Weight: (!) 145.8 kg (321 lb 6.4 oz)  Body mass index is 38.11 kg/m².    SpO2: 97 %  O2 Device (Oxygen Therapy): room air      Intake/Output Summary (Last 24 hours) at 6/16/2020 1129  Last data filed at 6/16/2020 0600  Gross per 24 hour   Intake 240 ml   Output 600 ml   Net -360 ml       Lines/Drains/Airways     Arterial Line                 Arterial Line 02/13/19 1338 488 days          Peripheral Intravenous Line                 Peripheral IV - Single Lumen 06/16/20 0733 20 G Anterior;Proximal;Right Forearm less than 1 day                Physical Exam   Constitutional: He is oriented to person, place, and time. He appears well-developed and well-nourished. No distress.   Eyes: Pupils are equal, round, and reactive to light. Conjunctivae are normal.   Neck: No tracheal deviation present. No thyromegaly present.   Cardiovascular: Normal rate, regular rhythm, normal heart sounds and intact distal pulses. Exam reveals no gallop and no friction rub.   No murmur heard.  Pulses:       Radial pulses are 2+ on the right side and 2+ on the left side.        Femoral pulses are 2+ on  the right side and 2+ on the left side.  Wound from draining ICD on L chest   Pulmonary/Chest: Effort normal and breath sounds normal. No respiratory distress. He has no wheezes. He has no rales.   Abdominal: Soft. Bowel sounds are normal. He exhibits no distension. There is no abdominal tenderness.   Musculoskeletal:         General: Edema present. No deformity.   Neurological: He is alert and oriented to person, place, and time. No cranial nerve deficit. Coordination normal.   Skin: Skin is warm and dry. He is not diaphoretic.   Psychiatric: He has a normal mood and affect. His behavior is normal.       Significant Labs:   BMP:   Recent Labs   Lab 06/16/20  0029         K 3.9      CO2 27   BUN 22   CREATININE 1.4   CALCIUM 9.0   MG 1.9       Significant Imaging: Echocardiogram:   Transthoracic echo (TTE) complete (Cupid Only):   Results for orders placed or performed during the hospital encounter of 11/04/18   Transthoracic echo (TTE) complete (Cupid Only)   Result Value Ref Range    BSA 2.84 m2    Ascending aorta 3.43 cm    STJ 2.46 cm    AV mean gradient 5.10 mmHg    Ao peak nicola 1.43 m/s    Ao VTI 24.82 cm    IVRT 0.09 msec    IVS 1.18 (A) 0.6 - 1.1 cm    LA size 5.85 cm    Left Atrium Major Axis 6.77 cm    Left Atrium Minor Axis 6.36 cm    LVIDD 6.97 (A) 3.5 - 6.0 cm    LVIDS 6.19 (A) 2.1 - 4.0 cm    LVOT diameter 2.40 cm    LVOT peak VTI 15.73 cm    PW 1.15 (A) 0.6 - 1.1 cm    MV Peak A Nicola 0.42 m/s    E wave decelartion time 146.03 msec    MV Peak E Nicola 1.18 m/s    RA Major Axis 5.39 cm    RA Width 4.82 cm    RVDD 5.41 cm    Sinus 3.73 cm    TR Max Nicola 2.44 m/s    TDI LATERAL 0.10     TDI SEPTAL 0.09     LA WIDTH 5.23 cm    Ao root annulus 3.42 cm    AORTIC VALVE CUSP SEPERATION 2.02 cm    PV PEAK VELOCITY 1.09 cm/s    PV peak gradient 4.71 mmHg    MV stenosis pressure 1/2 time 42.35 ms    AV peak gradient 8.22 mmHg    LV Diastolic Volume 253.21 mL    LV Systolic Volume 193.03 mL    LV  LATERAL E/E' RATIO 11.80     LV SEPTAL E/E' RATIO 13.11     FS 11 %    QEF 23.77 %    LA volume 170.56 cm3    LV mass 388.10 g    Left Ventricle Relative Wall Thickness 0.33 cm    AV valve area 2.87 cm2    MV valve area p 1/2 method 5.19 cm2    E/A ratio 2.81     Mean e' 0.10     LVOT area 4.52 cm2    LVOT stroke volume 71.12 cm3    E/E' ratio 12.42     LV Systolic Volume Index 68.0 mL/m2    LV Diastolic Volume Index 89.16 mL/m2    LA Volume Index 60.1 mL/m2    LV Mass Index 136.7 g/m2    Triscuspid Valve Regurgitation Peak Gradient 23.81 mmHg    Right Atrial Pressure (from IVC) 8.0 mmHg    TV rest pulmonary artery pressure 31.81 mmHg    Narrative    · Left ventricle ejection fraction is severely decreased at 20%  · The left ventricle cavity is mildly dilated.  · Left ventricle shows eccentric hypertrophy.  · Grade III (severe) left ventricular diastolic dysfunction consistent   with restrictive physiology.  · LA pressure is elevated.  · RV systolic function is severely reduced.  · Left atrium is severely dilated.  · Moderately elevated central venous pressure (8 mm Hg).  · The estimated PA systolic pressure is 31.81 mm Hg

## 2020-06-16 NOTE — PLAN OF CARE
06/16/20 1219   Discharge Assessment   Assessment Type Discharge Planning Assessment   Confirmed/corrected address and phone number on facesheet? Yes   Assessment information obtained from? Patient;Medical Record   Expected Length of Stay (days) 3   Prior to hospitilization cognitive status: Alert/Oriented   Prior to hospitalization functional status: Independent;Assistive Equipment   Current cognitive status: Alert/Oriented   Current Functional Status: Independent;Assistive Equipment   Lives With child(sourav), adult   Able to Return to Prior Arrangements yes   Is patient able to care for self after discharge? Yes   Patient's perception of discharge disposition home or selfcare;home health   Readmission Within the Last 30 Days no previous admission in last 30 days   Patient currently being followed by outpatient case management? No   Patient currently receives any other outside agency services? No   Equipment Currently Used at Home walker, rolling   Do you have any problems affording any of your prescribed medications? TBD   Is the patient taking medications as prescribed? yes   Does the patient have transportation home? Yes   Transportation Anticipated family or friend will provide   Does the patient receive services at the Coumadin Clinic? No   Discharge Plan A Home with family;Home Health   Discharge Plan B Home with family   DME Needed Upon Discharge  other (see comments)  ([facility]est)   Admitted with ICD infection. Lives with daughter and is independent in his ADLs. Pt to have device extracted tomorrow. LifeVest for DC is already being arranged. Will most likely need home IV Abx. Mentioned this to the pt and will follow up with that and other needs as DC is in sight.

## 2020-06-16 NOTE — HPI
Mr. Bhakta is a 65-year-old male with a past medical history significant for hypertension, hyperlipidemia, nonischemic cardiomyopathy status post Bi-V ICD 02/13/2013 [employer] with Dr. Shailesh Eng  EF 10%.  He notes increased drainage from his pocket site over the past 1 week.  Yesterday morning he was performing yardd work and noticed pain in his left pectoral region with acute dehiscence of his pocket.  This is the 1st issues with pocket that he has had the past. Denies fever, chills. Nausea. Vomiting. chest pain. or shortness of breath.  No issues of syncope.  No ICD shocks.      Discussed with patient today need for complete device extraction due to pocket infection.    · Limited study to assess function  · Severely decreased left ventricular systolic function. The estimated ejection fraction is 10%  · Left ventricular diastolic dysfunction.  · Severely reduced right ventricular systolic function.  · No thrombus

## 2020-06-17 ENCOUNTER — ANESTHESIA (OUTPATIENT)
Dept: MEDSURG UNIT | Facility: HOSPITAL | Age: 65
DRG: 261 | End: 2020-06-17
Payer: MEDICARE

## 2020-06-17 LAB
ABO + RH BLD: NORMAL
ALBUMIN SERPL BCP-MCNC: 3.2 G/DL (ref 3.5–5.2)
ALP SERPL-CCNC: 65 U/L (ref 55–135)
ALT SERPL W/O P-5'-P-CCNC: 9 U/L (ref 10–44)
ANION GAP SERPL CALC-SCNC: 8 MMOL/L (ref 8–16)
AST SERPL-CCNC: 14 U/L (ref 10–40)
BASOPHILS # BLD AUTO: 0.03 K/UL (ref 0–0.2)
BASOPHILS NFR BLD: 0.4 % (ref 0–1.9)
BILIRUB SERPL-MCNC: 0.9 MG/DL (ref 0.1–1)
BLD GP AB SCN CELLS X3 SERPL QL: NORMAL
BSA FOR ECHO PROCEDURE: 2.81 M2
BUN SERPL-MCNC: 15 MG/DL (ref 8–23)
CALCIUM SERPL-MCNC: 8.8 MG/DL (ref 8.7–10.5)
CHLORIDE SERPL-SCNC: 108 MMOL/L (ref 95–110)
CO2 SERPL-SCNC: 22 MMOL/L (ref 23–29)
CREAT SERPL-MCNC: 1.1 MG/DL (ref 0.5–1.4)
DIFFERENTIAL METHOD: ABNORMAL
EOSINOPHIL # BLD AUTO: 0.1 K/UL (ref 0–0.5)
EOSINOPHIL NFR BLD: 1.9 % (ref 0–8)
ERYTHROCYTE [DISTWIDTH] IN BLOOD BY AUTOMATED COUNT: 13.9 % (ref 11.5–14.5)
EST. GFR  (AFRICAN AMERICAN): >60 ML/MIN/1.73 M^2
EST. GFR  (NON AFRICAN AMERICAN): >60 ML/MIN/1.73 M^2
GLUCOSE SERPL-MCNC: 100 MG/DL (ref 70–110)
GLUCOSE SERPL-MCNC: 111 MG/DL (ref 70–110)
HCO3 UR-SCNC: 26.1 MMOL/L (ref 24–28)
HCT VFR BLD AUTO: 38.2 % (ref 40–54)
HCT VFR BLD CALC: 36 %PCV (ref 36–54)
HGB BLD-MCNC: 11.7 G/DL (ref 14–18)
IMM GRANULOCYTES # BLD AUTO: 0.02 K/UL (ref 0–0.04)
IMM GRANULOCYTES NFR BLD AUTO: 0.3 % (ref 0–0.5)
INR PPP: 1.1 (ref 0.8–1.2)
LYMPHOCYTES # BLD AUTO: 1.3 K/UL (ref 1–4.8)
LYMPHOCYTES NFR BLD: 17.8 % (ref 18–48)
MAGNESIUM SERPL-MCNC: 1.8 MG/DL (ref 1.6–2.6)
MCH RBC QN AUTO: 25.3 PG (ref 27–31)
MCHC RBC AUTO-ENTMCNC: 30.6 G/DL (ref 32–36)
MCV RBC AUTO: 83 FL (ref 82–98)
MONOCYTES # BLD AUTO: 0.8 K/UL (ref 0.3–1)
MONOCYTES NFR BLD: 12 % (ref 4–15)
NEUTROPHILS # BLD AUTO: 4.7 K/UL (ref 1.8–7.7)
NEUTROPHILS NFR BLD: 67.6 % (ref 38–73)
NRBC BLD-RTO: 0 /100 WBC
PCO2 BLDA: 41.4 MMHG (ref 35–45)
PH SMN: 7.41 [PH] (ref 7.35–7.45)
PHOSPHATE SERPL-MCNC: 3.4 MG/DL (ref 2.7–4.5)
PISA TR MAX VEL: 3.5 M/S
PISA TR RADIUS: 0.8 CM
PISA TR VN NYQUIST: 0.36 M/S
PLATELET # BLD AUTO: 160 K/UL (ref 150–350)
PMV BLD AUTO: 12.4 FL (ref 9.2–12.9)
PO2 BLDA: 211 MMHG (ref 80–100)
POC ACTIVATED CLOTTING TIME K: 136 SEC (ref 74–137)
POC BE: 1 MMOL/L
POC IONIZED CALCIUM: 1.23 MMOL/L (ref 1.06–1.42)
POC SATURATED O2: 100 % (ref 95–100)
POC TCO2: 27 MMOL/L (ref 23–27)
POTASSIUM BLD-SCNC: 4.3 MMOL/L (ref 3.5–5.1)
POTASSIUM SERPL-SCNC: 4.4 MMOL/L (ref 3.5–5.1)
PROT SERPL-MCNC: 7.1 G/DL (ref 6–8.4)
PROTHROMBIN TIME: 10.9 SEC (ref 9–12.5)
RBC # BLD AUTO: 4.62 M/UL (ref 4.6–6.2)
SAMPLE: ABNORMAL
SAMPLE: NORMAL
SODIUM BLD-SCNC: 140 MMOL/L (ref 136–145)
SODIUM SERPL-SCNC: 138 MMOL/L (ref 136–145)
TR MAX PG: 49 MMHG
TV AREA PISA: 0.41 CM2
WBC # BLD AUTO: 7.01 K/UL (ref 3.9–12.7)

## 2020-06-17 PROCEDURE — D9220A PRA ANESTHESIA: ICD-10-PCS | Mod: HCNC,CRNA,, | Performed by: NURSE ANESTHETIST, CERTIFIED REGISTERED

## 2020-06-17 PROCEDURE — 84100 ASSAY OF PHOSPHORUS: CPT | Mod: HCNC

## 2020-06-17 PROCEDURE — 25000003 PHARM REV CODE 250: Mod: HCNC | Performed by: NURSE PRACTITIONER

## 2020-06-17 PROCEDURE — 63600175 PHARM REV CODE 636 W HCPCS: Mod: HCNC | Performed by: NURSE ANESTHETIST, CERTIFIED REGISTERED

## 2020-06-17 PROCEDURE — D9220A PRA ANESTHESIA: Mod: HCNC,CRNA,, | Performed by: NURSE ANESTHETIST, CERTIFIED REGISTERED

## 2020-06-17 PROCEDURE — 93010 ELECTROCARDIOGRAM REPORT: CPT | Mod: HCNC,,, | Performed by: INTERNAL MEDICINE

## 2020-06-17 PROCEDURE — 27201423 OPTIME MED/SURG SUP & DEVICES STERILE SUPPLY: Mod: HCNC | Performed by: INTERNAL MEDICINE

## 2020-06-17 PROCEDURE — 99232 SBSQ HOSP IP/OBS MODERATE 35: CPT | Mod: HCNC,,, | Performed by: HOSPITALIST

## 2020-06-17 PROCEDURE — 36415 COLL VENOUS BLD VENIPUNCTURE: CPT | Mod: HCNC

## 2020-06-17 PROCEDURE — 93005 ELECTROCARDIOGRAM TRACING: CPT | Mod: HCNC

## 2020-06-17 PROCEDURE — D9220A PRA ANESTHESIA: Mod: HCNC,ANES,, | Performed by: ANESTHESIOLOGY

## 2020-06-17 PROCEDURE — 83735 ASSAY OF MAGNESIUM: CPT | Mod: HCNC

## 2020-06-17 PROCEDURE — 25000003 PHARM REV CODE 250: Mod: HCNC | Performed by: INTERNAL MEDICINE

## 2020-06-17 PROCEDURE — 86920 COMPATIBILITY TEST SPIN: CPT | Mod: HCNC

## 2020-06-17 PROCEDURE — 86901 BLOOD TYPING SEROLOGIC RH(D): CPT | Mod: HCNC

## 2020-06-17 PROCEDURE — 85025 COMPLETE CBC W/AUTO DIFF WBC: CPT | Mod: HCNC

## 2020-06-17 PROCEDURE — D9220A PRA ANESTHESIA: ICD-10-PCS | Mod: HCNC,ANES,, | Performed by: ANESTHESIOLOGY

## 2020-06-17 PROCEDURE — 87075 CULTR BACTERIA EXCEPT BLOOD: CPT | Mod: 59,HCNC

## 2020-06-17 PROCEDURE — 33241 REMOVE PULSE GENERATOR: CPT | Mod: 22,HCNC,, | Performed by: INTERNAL MEDICINE

## 2020-06-17 PROCEDURE — 33244 PR RMV PACER/ELECTRD,TRANSVEN EXTRCT: ICD-10-PCS | Mod: 22,HCNC,, | Performed by: INTERNAL MEDICINE

## 2020-06-17 PROCEDURE — 25000003 PHARM REV CODE 250: Mod: HCNC | Performed by: HOSPITALIST

## 2020-06-17 PROCEDURE — C1894 INTRO/SHEATH, NON-LASER: HCPCS | Mod: HCNC | Performed by: INTERNAL MEDICINE

## 2020-06-17 PROCEDURE — 11046 DBRDMT MUSC&/FSCA EA ADDL: CPT | Mod: 59,HCNC,, | Performed by: INTERNAL MEDICINE

## 2020-06-17 PROCEDURE — 37000008 HC ANESTHESIA 1ST 15 MINUTES: Mod: HCNC | Performed by: INTERNAL MEDICINE

## 2020-06-17 PROCEDURE — 37000009 HC ANESTHESIA EA ADD 15 MINS: Mod: HCNC | Performed by: INTERNAL MEDICINE

## 2020-06-17 PROCEDURE — 33244 REMOVE ELCTRD TRANSVENOUSLY: CPT | Mod: 22,HCNC,, | Performed by: INTERNAL MEDICINE

## 2020-06-17 PROCEDURE — 25000003 PHARM REV CODE 250: Mod: HCNC | Performed by: NURSE ANESTHETIST, CERTIFIED REGISTERED

## 2020-06-17 PROCEDURE — 33241 REMOVE PULSE GENERATOR: CPT | Mod: 22,HCNC | Performed by: INTERNAL MEDICINE

## 2020-06-17 PROCEDURE — 36620 INSERTION CATHETER ARTERY: CPT | Mod: 59,HCNC,, | Performed by: ANESTHESIOLOGY

## 2020-06-17 PROCEDURE — 11046 DBRDMT MUSC&/FSCA EA ADDL: CPT | Mod: 59,HCNC | Performed by: INTERNAL MEDICINE

## 2020-06-17 PROCEDURE — 11043 DBRDMT MUSC&/FSCA 1ST 20/<: CPT | Mod: 59,HCNC | Performed by: INTERNAL MEDICINE

## 2020-06-17 PROCEDURE — 99232 PR SUBSEQUENT HOSPITAL CARE,LEVL II: ICD-10-PCS | Mod: HCNC,,, | Performed by: HOSPITALIST

## 2020-06-17 PROCEDURE — 87070 CULTURE OTHR SPECIMN AEROBIC: CPT | Mod: HCNC

## 2020-06-17 PROCEDURE — 33241 PR RMV PULSE GENERATOR,SNGL/DUAL: ICD-10-PCS | Mod: 22,HCNC,, | Performed by: INTERNAL MEDICINE

## 2020-06-17 PROCEDURE — 93010 EKG 12-LEAD: ICD-10-PCS | Mod: HCNC,,, | Performed by: INTERNAL MEDICINE

## 2020-06-17 PROCEDURE — 20600001 HC STEP DOWN PRIVATE ROOM: Mod: HCNC

## 2020-06-17 PROCEDURE — 25000003 PHARM REV CODE 250: Mod: HCNC | Performed by: ANESTHESIOLOGY

## 2020-06-17 PROCEDURE — 25000003 PHARM REV CODE 250: Mod: HCNC | Performed by: STUDENT IN AN ORGANIZED HEALTH CARE EDUCATION/TRAINING PROGRAM

## 2020-06-17 PROCEDURE — 87070 CULTURE OTHR SPECIMN AEROBIC: CPT | Mod: 59,HCNC

## 2020-06-17 PROCEDURE — 87075 CULTR BACTERIA EXCEPT BLOOD: CPT | Mod: HCNC

## 2020-06-17 PROCEDURE — 85610 PROTHROMBIN TIME: CPT | Mod: HCNC

## 2020-06-17 PROCEDURE — 11046 PR DEB MUSC/FASCIA ADD-ON: ICD-10-PCS | Mod: 59,HCNC,, | Performed by: INTERNAL MEDICINE

## 2020-06-17 PROCEDURE — 11043 DBRDMT MUSC&/FSCA 1ST 20/<: CPT | Mod: 59,51,HCNC, | Performed by: INTERNAL MEDICINE

## 2020-06-17 PROCEDURE — 33244 REMOVE ELCTRD TRANSVENOUSLY: CPT | Mod: 22,HCNC | Performed by: INTERNAL MEDICINE

## 2020-06-17 PROCEDURE — 36620 PR INSERT CATH,ART,PERCUT,SHORTTERM: ICD-10-PCS | Mod: 59,HCNC,, | Performed by: ANESTHESIOLOGY

## 2020-06-17 PROCEDURE — 11043 PR DEBRIDEMENT, SKIN, SUB-Q TISSUE,MUSCLE,=<20 SQ CM: ICD-10-PCS | Mod: 59,51,HCNC, | Performed by: INTERNAL MEDICINE

## 2020-06-17 PROCEDURE — 80053 COMPREHEN METABOLIC PANEL: CPT | Mod: HCNC

## 2020-06-17 PROCEDURE — 63600175 PHARM REV CODE 636 W HCPCS: Mod: HCNC | Performed by: HOSPITALIST

## 2020-06-17 RX ORDER — LIDOCAINE HYDROCHLORIDE 20 MG/ML
INJECTION, SOLUTION INFILTRATION; PERINEURAL
Status: DISCONTINUED | OUTPATIENT
Start: 2020-06-17 | End: 2020-06-18

## 2020-06-17 RX ORDER — FENTANYL CITRATE 50 UG/ML
25 INJECTION, SOLUTION INTRAMUSCULAR; INTRAVENOUS EVERY 5 MIN PRN
Status: DISCONTINUED | OUTPATIENT
Start: 2020-06-17 | End: 2020-06-18

## 2020-06-17 RX ORDER — EPHEDRINE SULFATE 50 MG/ML
INJECTION, SOLUTION INTRAVENOUS
Status: DISCONTINUED | OUTPATIENT
Start: 2020-06-17 | End: 2020-06-17

## 2020-06-17 RX ORDER — ACETAMINOPHEN 325 MG/1
650 TABLET ORAL EVERY 4 HOURS PRN
Status: DISCONTINUED | OUTPATIENT
Start: 2020-06-17 | End: 2020-06-18

## 2020-06-17 RX ORDER — DEXMEDETOMIDINE HYDROCHLORIDE 100 UG/ML
INJECTION, SOLUTION INTRAVENOUS
Status: DISCONTINUED | OUTPATIENT
Start: 2020-06-17 | End: 2020-06-17

## 2020-06-17 RX ORDER — CARVEDILOL 6.25 MG/1
12.5 TABLET ORAL 2 TIMES DAILY
Status: DISCONTINUED | OUTPATIENT
Start: 2020-06-17 | End: 2020-06-20 | Stop reason: HOSPADM

## 2020-06-17 RX ORDER — EPINEPHRINE 0.1 MG/ML
INJECTION INTRAVENOUS
Status: DISCONTINUED | OUTPATIENT
Start: 2020-06-17 | End: 2020-06-17

## 2020-06-17 RX ORDER — ONDANSETRON 2 MG/ML
INJECTION INTRAMUSCULAR; INTRAVENOUS
Status: DISCONTINUED | OUTPATIENT
Start: 2020-06-17 | End: 2020-06-17

## 2020-06-17 RX ORDER — HYDROCODONE BITARTRATE AND ACETAMINOPHEN 500; 5 MG/1; MG/1
TABLET ORAL
Status: DISCONTINUED | OUTPATIENT
Start: 2020-06-17 | End: 2020-06-18

## 2020-06-17 RX ORDER — BUPIVACAINE HYDROCHLORIDE 2.5 MG/ML
INJECTION, SOLUTION EPIDURAL; INFILTRATION; INTRACAUDAL
Status: DISCONTINUED | OUTPATIENT
Start: 2020-06-17 | End: 2020-06-20 | Stop reason: HOSPADM

## 2020-06-17 RX ORDER — DEXMEDETOMIDINE HYDROCHLORIDE 4 UG/ML
INJECTION, SOLUTION INTRAVENOUS
Status: DISPENSED
Start: 2020-06-17 | End: 2020-06-18

## 2020-06-17 RX ORDER — ROCURONIUM BROMIDE 10 MG/ML
INJECTION, SOLUTION INTRAVENOUS
Status: DISCONTINUED | OUTPATIENT
Start: 2020-06-17 | End: 2020-06-17

## 2020-06-17 RX ORDER — CEFAZOLIN SODIUM 1 G/3ML
INJECTION, POWDER, FOR SOLUTION INTRAMUSCULAR; INTRAVENOUS
Status: DISCONTINUED | OUTPATIENT
Start: 2020-06-17 | End: 2020-06-17

## 2020-06-17 RX ORDER — ETOMIDATE 2 MG/ML
INJECTION INTRAVENOUS
Status: DISCONTINUED | OUTPATIENT
Start: 2020-06-17 | End: 2020-06-17

## 2020-06-17 RX ORDER — LIDOCAINE HYDROCHLORIDE 20 MG/ML
INJECTION INTRAVENOUS
Status: DISCONTINUED | OUTPATIENT
Start: 2020-06-17 | End: 2020-06-17

## 2020-06-17 RX ORDER — SODIUM CHLORIDE 9 MG/ML
INJECTION, SOLUTION INTRAVENOUS CONTINUOUS PRN
Status: DISCONTINUED | OUTPATIENT
Start: 2020-06-17 | End: 2020-06-17

## 2020-06-17 RX ORDER — MIDAZOLAM HYDROCHLORIDE 1 MG/ML
INJECTION, SOLUTION INTRAMUSCULAR; INTRAVENOUS
Status: DISCONTINUED | OUTPATIENT
Start: 2020-06-17 | End: 2020-06-17

## 2020-06-17 RX ORDER — PHENYLEPHRINE HYDROCHLORIDE 10 MG/ML
INJECTION INTRAVENOUS
Status: DISCONTINUED | OUTPATIENT
Start: 2020-06-17 | End: 2020-06-17

## 2020-06-17 RX ORDER — FENTANYL CITRATE 50 UG/ML
INJECTION, SOLUTION INTRAMUSCULAR; INTRAVENOUS
Status: DISCONTINUED | OUTPATIENT
Start: 2020-06-17 | End: 2020-06-17

## 2020-06-17 RX ORDER — ONDANSETRON 2 MG/ML
4 INJECTION INTRAMUSCULAR; INTRAVENOUS DAILY PRN
Status: DISCONTINUED | OUTPATIENT
Start: 2020-06-17 | End: 2020-06-20 | Stop reason: HOSPADM

## 2020-06-17 RX ORDER — CEFAZOLIN SODIUM 1 G/3ML
1 INJECTION, POWDER, FOR SOLUTION INTRAMUSCULAR; INTRAVENOUS
Status: DISCONTINUED | OUTPATIENT
Start: 2020-06-17 | End: 2020-06-17

## 2020-06-17 RX ADMIN — PRAVASTATIN SODIUM 80 MG: 40 TABLET ORAL at 08:06

## 2020-06-17 RX ADMIN — EPHEDRINE SULFATE 5 MG: 50 INJECTION, SOLUTION INTRAMUSCULAR; INTRAVENOUS; SUBCUTANEOUS at 11:06

## 2020-06-17 RX ADMIN — EPHEDRINE SULFATE 10 MG: 50 INJECTION, SOLUTION INTRAMUSCULAR; INTRAVENOUS; SUBCUTANEOUS at 12:06

## 2020-06-17 RX ADMIN — ROCURONIUM BROMIDE 20 MG: 10 INJECTION, SOLUTION INTRAVENOUS at 11:06

## 2020-06-17 RX ADMIN — DEXMEDETOMIDINE HYDROCHLORIDE 20 MCG: 100 INJECTION, SOLUTION, CONCENTRATE INTRAVENOUS at 02:06

## 2020-06-17 RX ADMIN — ETOMIDATE 10 MG: 2 INJECTION, SOLUTION INTRAVENOUS at 10:06

## 2020-06-17 RX ADMIN — SODIUM CHLORIDE, SODIUM GLUCONATE, SODIUM ACETATE, POTASSIUM CHLORIDE, MAGNESIUM CHLORIDE, SODIUM PHOSPHATE, DIBASIC, AND POTASSIUM PHOSPHATE: .53; .5; .37; .037; .03; .012; .00082 INJECTION, SOLUTION INTRAVENOUS at 10:06

## 2020-06-17 RX ADMIN — VANCOMYCIN HYDROCHLORIDE 2500 MG: 1.25 INJECTION, POWDER, LYOPHILIZED, FOR SOLUTION INTRAVENOUS at 11:06

## 2020-06-17 RX ADMIN — SUGAMMADEX 400 MG: 100 INJECTION, SOLUTION INTRAVENOUS at 02:06

## 2020-06-17 RX ADMIN — EPINEPHRINE 10 MCG: 0.1 INJECTION, SOLUTION ENDOTRACHEAL; INTRACARDIAC; INTRAVENOUS at 01:06

## 2020-06-17 RX ADMIN — PHENYLEPHRINE HYDROCHLORIDE 100 MCG: 10 INJECTION INTRAVENOUS at 10:06

## 2020-06-17 RX ADMIN — EPHEDRINE SULFATE 10 MG: 50 INJECTION, SOLUTION INTRAMUSCULAR; INTRAVENOUS; SUBCUTANEOUS at 11:06

## 2020-06-17 RX ADMIN — DEXMEDETOMIDINE HYDROCHLORIDE 0.5 MCG/KG/HR: 100 INJECTION, SOLUTION INTRAVENOUS at 03:06

## 2020-06-17 RX ADMIN — SPIRONOLACTONE 25 MG: 25 TABLET ORAL at 08:06

## 2020-06-17 RX ADMIN — ROCURONIUM BROMIDE 10 MG: 10 INJECTION, SOLUTION INTRAVENOUS at 12:06

## 2020-06-17 RX ADMIN — ROCURONIUM BROMIDE 10 MG: 10 INJECTION, SOLUTION INTRAVENOUS at 01:06

## 2020-06-17 RX ADMIN — SODIUM CHLORIDE, SODIUM GLUCONATE, SODIUM ACETATE, POTASSIUM CHLORIDE, MAGNESIUM CHLORIDE, SODIUM PHOSPHATE, DIBASIC, AND POTASSIUM PHOSPHATE: .53; .5; .37; .037; .03; .012; .00082 INJECTION, SOLUTION INTRAVENOUS at 11:06

## 2020-06-17 RX ADMIN — LIDOCAINE HYDROCHLORIDE 100 MG: 20 INJECTION, SOLUTION INTRAVENOUS at 10:06

## 2020-06-17 RX ADMIN — MIDAZOLAM 2 MG: 1 INJECTION INTRAMUSCULAR; INTRAVENOUS at 10:06

## 2020-06-17 RX ADMIN — SODIUM CHLORIDE: 9 INJECTION, SOLUTION INTRAVENOUS at 12:06

## 2020-06-17 RX ADMIN — RAMIPRIL 10 MG: 2.5 CAPSULE ORAL at 08:06

## 2020-06-17 RX ADMIN — EPINEPHRINE 20 MCG: 0.1 INJECTION, SOLUTION ENDOTRACHEAL; INTRACARDIAC; INTRAVENOUS at 02:06

## 2020-06-17 RX ADMIN — CEFTRIAXONE 2 G: 2 INJECTION, SOLUTION INTRAVENOUS at 10:06

## 2020-06-17 RX ADMIN — EPHEDRINE SULFATE 5 MG: 50 INJECTION, SOLUTION INTRAMUSCULAR; INTRAVENOUS; SUBCUTANEOUS at 12:06

## 2020-06-17 RX ADMIN — FENTANYL CITRATE 25 MCG: 50 INJECTION, SOLUTION INTRAMUSCULAR; INTRAVENOUS at 10:06

## 2020-06-17 RX ADMIN — EPINEPHRINE 20 MCG: 0.1 INJECTION, SOLUTION ENDOTRACHEAL; INTRACARDIAC; INTRAVENOUS at 01:06

## 2020-06-17 RX ADMIN — ONDANSETRON 4 MG: 2 INJECTION INTRAMUSCULAR; INTRAVENOUS at 01:06

## 2020-06-17 RX ADMIN — CARVEDILOL 12.5 MG: 25 TABLET, FILM COATED ORAL at 08:06

## 2020-06-17 RX ADMIN — CEFAZOLIN 3 G: 330 INJECTION, POWDER, FOR SOLUTION INTRAMUSCULAR; INTRAVENOUS at 01:06

## 2020-06-17 RX ADMIN — CARVEDILOL 12.5 MG: 25 TABLET, FILM COATED ORAL at 10:06

## 2020-06-17 RX ADMIN — FUROSEMIDE 80 MG: 80 TABLET ORAL at 08:06

## 2020-06-17 RX ADMIN — ROCURONIUM BROMIDE 10 MG: 10 INJECTION, SOLUTION INTRAVENOUS at 11:06

## 2020-06-17 RX ADMIN — ROCURONIUM BROMIDE 40 MG: 10 INJECTION, SOLUTION INTRAVENOUS at 10:06

## 2020-06-17 NOTE — TRANSFER OF CARE
"Anesthesia Transfer of Care Note    Patient: Papito Bhakta    Procedure(s) Performed: Procedure(s) (LRB):  EXTRACTION, ELECTRODE LEAD (Left)  ECHOCARDIOGRAM,TRANSESOPHAGEAL (N/A)    Patient location: PACU    Anesthesia Type: general    Transport from OR: Transported from OR on 6-10 L/min O2 by face mask with adequate spontaneous ventilation    Post pain: adequate analgesia    Post assessment: no apparent anesthetic complications and tolerated procedure well    Post vital signs: stable    Level of consciousness: awake    Nausea/Vomiting: no nausea/vomiting    Complications: none    Transfer of care protocol was followed      Last vitals:   Visit Vitals  /73   Pulse (!) 52   Temp 36.9 °C (98.4 °F) (Oral)   Resp 18   Ht 6' 5" (1.956 m)   Wt (!) 145.6 kg (321 lb)   SpO2 98%   BMI 38.07 kg/m²     "

## 2020-06-17 NOTE — PROGRESS NOTES
Patient arrived to unit. VSS. See Epic for a complete assessment. Patient instructed on pain scale and patient verbalized understanding. Patient arrived and noncooperative. Pt attempting to sit up and climb out of bed. Anesthesia at bedside. Ordered precedex drip until bedrest complete (see MAR).  Called patient's daughter and updated on patient location and condition with the permission of the patient.

## 2020-06-17 NOTE — BRIEF OP NOTE
Patient is s/p extraction of infected ICD :   Tolerated procedure well. No acute complication noted.  Will monitor in recovery on tele overnight  Ancef 1 gram q8 hours x 2 doses (ordered)  NO HEPARIN PRODUCTS  Keflex 500 mg TID for 5 days at discharge  Aquacel dressing to be removed by device clinic nurse in 1 week  Chest Xray (ordered)  Groin site - b/l - hemostasis with manual pressure .    Other instruction:   ==============================  Sling to left arm - wear for 48 hours, then only at night for 6 weeks.  No lifting left elbow above shoulder height  No lifting over 5 pounds  No driving for 1 week and for 4 weeks if patient uses left arm to make turns  Do not let beam of shower/water hit site directly and no scrubbing in area  Follow up in device clinic in 1 week and with Ep clinic  in 3 months.  Notify Cardiology/EP increased redness, warmth, drainage, or re-opening of the wound   Please call [phone] option 2 during business hours or the main [facility] number and ask for on-call for device clinic after hours.

## 2020-06-17 NOTE — PROGRESS NOTES
Report given to Rossi pacu RN. Patient being transferred from EP pacu to 2nd floor pacu on precedex drip until bedrest complete at 1900.

## 2020-06-17 NOTE — PROGRESS NOTES
Pharmacokinetic Initial Assessment: IV Vancomycin    Assessment/Plan:    Initiate intravenous vancomycin with loading dose of 2500 mg once followed by a maintenance dose of vancomycin 2000mg IV every 12 hours  Desired empiric serum trough concentration is 10 to 20 mcg/mL  Draw vancomycin trough level 30 min prior to fourth dose on 6/19 at approximately 0500 with AM lab draw.  Pharmacy will continue to follow and monitor vancomycin.      Please contact pharmacy at extension [phone] with any questions regarding this assessment.     Thank you for the consult,   Samuel Gray       Patient brief summary:  Papito Bhakta is a 65 y.o. male initiated on antimicrobial therapy with IV Vancomycin for treatment of suspected skin & soft tissue infection    Drug Allergies:   Review of patient's allergies indicates:  No Known Allergies    Actual Body Weight:   145.6 kg    Renal Function:   Estimated Creatinine Clearance: 105.8 mL/min (based on SCr of 1.1 mg/dL).,       CBC (last 72 hours):  Recent Labs   Lab Result Units 06/16/20  0029 06/17/20  1013   WBC K/uL 5.90 7.01   Hemoglobin g/dL 12.0* 11.7*   Hematocrit % 41.2 38.2*   Platelets K/uL 165 160   Gran% % 49.1 67.6   Lymph% % 32.0 17.8*   Mono% % 14.4 12.0   Eosinophil% % 3.6 1.9   Basophil% % 0.7 0.4   Differential Method  Automated Automated       Metabolic Panel (last 72 hours):  Recent Labs   Lab Result Units 06/16/20  0029 06/17/20  1013   Sodium mmol/L 144 138   Potassium mmol/L 3.9 4.4   Chloride mmol/L 107 108   CO2 mmol/L 27 22*   Glucose mg/dL 105 100   BUN, Bld mg/dL 22 15   Creatinine mg/dL 1.4 1.1   Albumin g/dL 3.4* 3.2*   Total Bilirubin mg/dL 0.6 0.9   Alkaline Phosphatase U/L 61 65   AST U/L 16 14   ALT U/L 9* 9*   Magnesium mg/dL 1.9 1.8   Phosphorus mg/dL 3.4 3.4       Drug levels (last 3 results):  No results for input(s): VANCOMYCINRA, VANCOMYCINPE, VANCOMYCINTR in the last 72 hours.    Microbiologic Results:  Microbiology Results (last 7 days)      Procedure Component Value Units Date/Time    Culture, Anaerobe [[id number]] Collected: 06/17/20 1320    Order Status: Sent Specimen: Incision site from Heart Updated: 06/17/20 1335    Aerobic culture [[id number]] Collected: 06/17/20 1320    Order Status: Sent Specimen: Incision site from Heart Updated: 06/17/20 1335    Culture, Anaerobe [[id number]] Collected: 06/17/20 1310    Order Status: Sent Specimen: Incision site from Heart Updated: 06/17/20 1334    Aerobic culture [[id number]] Collected: 06/17/20 1310    Order Status: Sent Specimen: Incision site from Heart Updated: 06/17/20 1334    Culture, Anaerobe [[id number]] Collected: 06/17/20 1305    Order Status: Sent Specimen: Incision site from Heart Updated: 06/17/20 1333    Aerobic culture [[id number]] Collected: 06/17/20 1305    Order Status: Sent Specimen: Incision site from Heart Updated: 06/17/20 1333    Blood culture (site 2) [[id number]] Collected: 06/16/20 0031    Order Status: Completed Specimen: Blood Updated: 06/17/20 0613     Blood Culture, Routine No Growth to date      No Growth to date    Narrative:      Site # 2, aerobic only    Blood culture (site 1) [[id number]] Collected: 06/16/20 0031    Order Status: Completed Specimen: Blood Updated: 06/17/20 0613     Blood Culture, Routine No Growth to date      No Growth to date    Narrative:      Site # 1, aerobic and anaerobic (central line, if patient has  one)

## 2020-06-17 NOTE — PROGRESS NOTES
Rapid Response Nurse Chart Check     Chart check completed, abnormal VS noted. bedside RNGenevieve contacted, no concerns verbalized at this time, Patient is still in EP PACU, instructed to call [phone] for further concerns or assistance.

## 2020-06-17 NOTE — PLAN OF CARE
Problem: Adult Inpatient Plan of Care  Goal: Plan of Care Review  Outcome: Ongoing, Progressing    Pt remained free from falls/trauma/injuries. No complaints of CP/SOB/discomfort. Pt going down for a pacemaker electrode extraction. NPO status maintained. VSS. Fall bundle in place. POC explained, no questions at this time. Pt tolerating care.

## 2020-06-17 NOTE — PROGRESS NOTES
"Hospital Medicine   Progress note      Team: [facility] G Nieves Medrano MD   Admit Date: 6/15/2020   Hospital Day: 2  MARIIA: 6/17/2020   Code status: Full Code   Principal Problem: Erosion of pacemaker pocket due to and not concurrent with implantation of cardiac pacemaker     Summary:  Papito Bhakta is a 65 y.o. male with a PMHx of combined HF (EF 20%, G3DD), AICD in place, HLD, HTN, , chronic LLE edema due to venous statsis, who presents to the hospital as a transfer from [facility] for evaluation of AICD/pacemaker protrusion through skin.  The patient was in his usual state of health and states he had been cutting grass earlier in the day when he noted a "sticking" sensation near his pacemaker.  He subsequently took his shirt off and noted the device protruding through his skin prompting him to present to the OSH for evaluation.   he did note some drainage out of that area for about 1 week.  The patient denies any pain, numbness, weakness, fever, CP, SOB, or device discharges. He was transferred to [facility] for higher level of care.  AICD device easily seen protruding through the skin of the patient's left chest wall.  No drainage or erythema.  The patient is admitted to Hospital Medicine, EP consulted.  Patient will undergo device extraction on 06/17.  ICD interrogation ordered to ensure that patient is not pacemaker dependent.    Interval hx:   Pt was seen and examined at bedside. Pt had no acute events overnight, and no new complaints this morning. Pt remained hemodynamically stable and afebrile.  BiV ICD still eroding to skin over left chest wall, appear unchanged from yesterday.  Patient denies any chest pain, shortness of breath, cough, sinus congestion rhinorrhea, nausea, vomiting, diarrhea.  Reports a lidocaine patches of helped his knee pain.  Patient has been NPO after midnight for extraction procedure today.    ROS (Positive in Bold, otherwise negative)  Constitutional: fever, " chills, night sweats  CV: chest pain, edema, palpitations  Resp: SOB, cough, sputum production  GI: changes in appetite, NVDC, pain, melena, hematochezia, GERD, hematemesis  : Dysuria, hematuria, urinary urgency, frequency  MSK: arthralgia/myalgia, joint swelling  Neuro/Psych: anxiety, depression    PEx   Temp:  [97.8 °F (36.6 °C)-98.5 °F (36.9 °C)]   Pulse:  [52-87]   Resp:  [16-20]   BP: (111-135)/(58-79)   SpO2:  [96 %-99 %]      I & O (Last 24H):     Intake/Output Summary (Last 24 hours) at 6/17/2020 0852  Last data filed at 6/17/2020 0500  Gross per 24 hour   Intake 762 ml   Output 1175 ml   Net -413 ml       General:  male  in no acute distress. Nontoxic. Resting in bed. Cooperative.  HEENT: NCAT. PERRL. EOMI. Sclera Anicteric.  CVS: RRR. Normal S1 S2. No murmurs.  ICD device seen protruding out of skin over left chest wall.  Pulm: CTAB. Normal respiratory effort. No wheezes, rhonchi, or crackles.  Abdomen: Soft. Non-distended. No tenderness to palpation. No rebound or guarding. +BS.  Extremities: +1 ble  edema. No cyanosis. Full ROM.  Neuro: Alert, oriented x 4, Spont mvt of all extremities with no focal deficits noted.    Recent Results (from the past 24 hour(s))   Type & Screen    Collection Time: 06/17/20  4:43 AM   Result Value Ref Range    Group & Rh O POS     Indirect Davi NEG    Protime-INR    Collection Time: 06/17/20  4:43 AM   Result Value Ref Range    Prothrombin Time 10.9 9.0 - 12.5 sec    INR 1.1 0.8 - 1.2   Prepare RBC 4 Units; IN EP LAB FOR PROCEDURE    Collection Time: 06/17/20  4:43 AM   Result Value Ref Range    UNIT NUMBER [id number]     Product Code [id number]     DISPENSE STATUS ISSUED     CODING SYSTEM YIAV129     Unit Blood Type Code 5100     Unit Blood Type O POS     Unit Expiration [id number]     UNIT NUMBER [id number]     Product Code [id number]     DISPENSE STATUS ISSUED     CODING SYSTEM [id number]     Unit Blood Type Code 5100     Unit Blood Type O POS      Unit Expiration [id number]     UNIT NUMBER [id number]     Product Code R4744R95     DISPENSE STATUS ISSUED     CODING SYSTEM PDGL769     Unit Blood Type Code 5100     Unit Blood Type O POS     Unit Expiration [id number]     UNIT NUMBER [id number]     Product Code [id number]     DISPENSE STATUS ISSUED     CODING SYSTEM XOSF320     Unit Blood Type Code 5100     Unit Blood Type O POS     Unit Expiration [id number]        No results for input(s): POCTGLUCOSE in the last 168 hours.    No results found for: HGBA1C     Active Hospital Problems    Diagnosis  POA    *Erosion of pacemaker pocket due to and not concurrent with implantation of cardiac pacemaker [T82.897S]  Not Applicable    NICM (nonischemic cardiomyopathy) [I42.8]  Yes     Chronic    Infection of biventricular implantable cardioverter-defibrillator (ICD) [T82.7XXA]  Yes    Pacemaker complications, initial encounter [T82.9XXA]  Yes    Hyperlipidemia [E78.5]  Yes     Chronic    Essential hypertension [I10]  Yes     Chronic    Chronic combined systolic and diastolic congestive heart failure [I50.42]  Yes      Resolved Hospital Problems   No resolved problems to display.          Assessment and Plan for problems addressed today:      aspirin  325 mg Oral Daily    carvediloL  12.5 mg Oral BID    furosemide  80 mg Oral Daily    lidocaine  1 patch Transdermal Q24H    pravastatin  80 mg Oral Daily    ramipriL  10 mg Oral Daily    spironolactone  25 mg Oral Daily     sodium chloride, sodium chloride, acetaminophen, ketorolac, ondansetron, ondansetron, potassium chloride 10%, potassium chloride 10%, potassium, sodium phosphates, potassium, sodium phosphates, potassium, sodium phosphates, sodium chloride 0.9%    Erosion of pacemaker pocket due to and not concurrent with implantation of cardiac pacemaker  -patient with combined systolic and diastolic congestive heart failure, s/p ICD BIV placement in 02/2019  -Consulted Electrophysiology for AICD  evaluation/stabilization  -device check ordered  -plan for extraction on 06/17/2020  -patient without leukocytosis, fevers, any other signs or symptoms of infections.  -hold antibiotics for now, but low threshold to start antibiotics if pt were to become hemodynamically unstable or febrile .  -Blood cultures from 06/15 with no growth to date  -during ICD extraction, cultures of pocket and leads to be obtained by EP  -immediately post extraction, will start empiric vancomycin and ceftriaxone per ID recommendations  -Telemetry, Vitals per unit protocol  -monitor I/Os to r/o oliguria, end organ damage, maintain UOP   -will consult infectious disease for antibiotic recommendations.        Chronic combined systolic and diastolic congestive heart failure  -Most recent echo on (1/8/2019) reveals an EF of 10%, left ventricular diastolic dysfunction, severely reduced right ventricular systolic function  -Continue symptomatic management by diuresis with lasix 80 mg p.o. daily  -Cont asa 325mg, ramipril, pravastatin, Coreg 12.5 mg b.i.d.  -continue spironolactone given EF < 35%, NYHA class > II  -Continue to monitor on telemetry  -Monitor intake and output and obtain daily STANDING weights  -Fluid restriction to 1.5L per day and cardiac diet with salt restriction  -Supplemental O2 to keep Spo2 >90%, with PRN BiPAP for dyspnea/orthopnea  -Elevate head of bed     Essential hypertension  -Continue home ramipril, Coreg, spironolactone, Lasix  -monitor vitals q4h  -SBP goal of <160 in hospital      Hyperlipidemia  -Continue home pravastatin 80 mg daily     DVT PPx:  Heparin    Discharge plan and follow up: DC home once medically stable     Nieves Medrano MD  Hospital Medicine Staff  [phone] pager

## 2020-06-17 NOTE — CARE UPDATE
Brief Electrophysiology Note    Patient planned for extraction today. Instrinsic NSR 60, little pacing requirement - decreased Coreg to 12.5mg bid in anticipation of device removal, can uptitrate as tolerated starting tomorrow and subsequently in clinic.      Raul Aguilera MD  PGY-4, Cardiology  Pager [phone]

## 2020-06-18 LAB
ALBUMIN SERPL BCP-MCNC: 3 G/DL (ref 3.5–5.2)
ALP SERPL-CCNC: 63 U/L (ref 55–135)
ALT SERPL W/O P-5'-P-CCNC: 6 U/L (ref 10–44)
ANION GAP SERPL CALC-SCNC: 7 MMOL/L (ref 8–16)
AST SERPL-CCNC: 13 U/L (ref 10–40)
BASOPHILS # BLD AUTO: 0.01 K/UL (ref 0–0.2)
BASOPHILS NFR BLD: 0.1 % (ref 0–1.9)
BILIRUB SERPL-MCNC: 1 MG/DL (ref 0.1–1)
BUN SERPL-MCNC: 16 MG/DL (ref 8–23)
CALCIUM SERPL-MCNC: 8.7 MG/DL (ref 8.7–10.5)
CHLORIDE SERPL-SCNC: 103 MMOL/L (ref 95–110)
CO2 SERPL-SCNC: 28 MMOL/L (ref 23–29)
CREAT SERPL-MCNC: 1.3 MG/DL (ref 0.5–1.4)
DIFFERENTIAL METHOD: ABNORMAL
EOSINOPHIL # BLD AUTO: 0.1 K/UL (ref 0–0.5)
EOSINOPHIL NFR BLD: 1.3 % (ref 0–8)
ERYTHROCYTE [DISTWIDTH] IN BLOOD BY AUTOMATED COUNT: 13.8 % (ref 11.5–14.5)
EST. GFR  (AFRICAN AMERICAN): >60 ML/MIN/1.73 M^2
EST. GFR  (NON AFRICAN AMERICAN): 57.3 ML/MIN/1.73 M^2
GLUCOSE SERPL-MCNC: 88 MG/DL (ref 70–110)
HCT VFR BLD AUTO: 39 % (ref 40–54)
HGB BLD-MCNC: 11.8 G/DL (ref 14–18)
IMM GRANULOCYTES # BLD AUTO: 0.02 K/UL (ref 0–0.04)
IMM GRANULOCYTES NFR BLD AUTO: 0.3 % (ref 0–0.5)
LACTATE SERPL-SCNC: 0.8 MMOL/L (ref 0.5–2.2)
LYMPHOCYTES # BLD AUTO: 1.2 K/UL (ref 1–4.8)
LYMPHOCYTES NFR BLD: 17.9 % (ref 18–48)
MAGNESIUM SERPL-MCNC: 1.9 MG/DL (ref 1.6–2.6)
MCH RBC QN AUTO: 25.1 PG (ref 27–31)
MCHC RBC AUTO-ENTMCNC: 30.3 G/DL (ref 32–36)
MCV RBC AUTO: 83 FL (ref 82–98)
MONOCYTES # BLD AUTO: 0.8 K/UL (ref 0.3–1)
MONOCYTES NFR BLD: 11.5 % (ref 4–15)
NEUTROPHILS # BLD AUTO: 4.7 K/UL (ref 1.8–7.7)
NEUTROPHILS NFR BLD: 68.9 % (ref 38–73)
NRBC BLD-RTO: 0 /100 WBC
PHOSPHATE SERPL-MCNC: 3.8 MG/DL (ref 2.7–4.5)
PLATELET # BLD AUTO: 133 K/UL (ref 150–350)
PMV BLD AUTO: 12.2 FL (ref 9.2–12.9)
POTASSIUM SERPL-SCNC: 4.7 MMOL/L (ref 3.5–5.1)
PROT SERPL-MCNC: 7.1 G/DL (ref 6–8.4)
RBC # BLD AUTO: 4.7 M/UL (ref 4.6–6.2)
SODIUM SERPL-SCNC: 138 MMOL/L (ref 136–145)
WBC # BLD AUTO: 6.86 K/UL (ref 3.9–12.7)

## 2020-06-18 PROCEDURE — 25000003 PHARM REV CODE 250: Mod: HCNC | Performed by: NURSE PRACTITIONER

## 2020-06-18 PROCEDURE — 94761 N-INVAS EAR/PLS OXIMETRY MLT: CPT | Mod: HCNC

## 2020-06-18 PROCEDURE — 25000003 PHARM REV CODE 250: Mod: HCNC | Performed by: STUDENT IN AN ORGANIZED HEALTH CARE EDUCATION/TRAINING PROGRAM

## 2020-06-18 PROCEDURE — 99233 SBSQ HOSP IP/OBS HIGH 50: CPT | Mod: HCNC,,, | Performed by: PHYSICIAN ASSISTANT

## 2020-06-18 PROCEDURE — 25000003 PHARM REV CODE 250: Mod: HCNC | Performed by: HOSPITALIST

## 2020-06-18 PROCEDURE — 99233 SBSQ HOSP IP/OBS HIGH 50: CPT | Mod: HCNC,,, | Performed by: HOSPITALIST

## 2020-06-18 PROCEDURE — 83605 ASSAY OF LACTIC ACID: CPT | Mod: HCNC

## 2020-06-18 PROCEDURE — 84100 ASSAY OF PHOSPHORUS: CPT | Mod: HCNC

## 2020-06-18 PROCEDURE — 99233 PR SUBSEQUENT HOSPITAL CARE,LEVL III: ICD-10-PCS | Mod: HCNC,,, | Performed by: PHYSICIAN ASSISTANT

## 2020-06-18 PROCEDURE — 99233 PR SUBSEQUENT HOSPITAL CARE,LEVL III: ICD-10-PCS | Mod: HCNC,,, | Performed by: HOSPITALIST

## 2020-06-18 PROCEDURE — 36415 COLL VENOUS BLD VENIPUNCTURE: CPT | Mod: HCNC

## 2020-06-18 PROCEDURE — 20600001 HC STEP DOWN PRIVATE ROOM: Mod: HCNC

## 2020-06-18 PROCEDURE — 83735 ASSAY OF MAGNESIUM: CPT | Mod: HCNC

## 2020-06-18 PROCEDURE — 63600175 PHARM REV CODE 636 W HCPCS: Mod: HCNC | Performed by: HOSPITALIST

## 2020-06-18 PROCEDURE — 80053 COMPREHEN METABOLIC PANEL: CPT | Mod: HCNC

## 2020-06-18 PROCEDURE — 85025 COMPLETE CBC W/AUTO DIFF WBC: CPT | Mod: HCNC

## 2020-06-18 RX ORDER — LIDOCAINE 50 MG/G
1 PATCH TOPICAL
Status: DISCONTINUED | OUTPATIENT
Start: 2020-06-18 | End: 2020-06-20 | Stop reason: HOSPADM

## 2020-06-18 RX ORDER — HEPARIN SODIUM 5000 [USP'U]/ML
5000 INJECTION, SOLUTION INTRAVENOUS; SUBCUTANEOUS EVERY 8 HOURS
Status: DISCONTINUED | OUTPATIENT
Start: 2020-06-18 | End: 2020-06-20 | Stop reason: HOSPADM

## 2020-06-18 RX ADMIN — HEPARIN SODIUM 5000 UNITS: 5000 INJECTION INTRAVENOUS; SUBCUTANEOUS at 09:06

## 2020-06-18 RX ADMIN — SPIRONOLACTONE 25 MG: 25 TABLET ORAL at 08:06

## 2020-06-18 RX ADMIN — PRAVASTATIN SODIUM 80 MG: 40 TABLET ORAL at 08:06

## 2020-06-18 RX ADMIN — DEXTROSE MONOHYDRATE 2000 MG: 50 INJECTION, SOLUTION INTRAVENOUS at 02:06

## 2020-06-18 RX ADMIN — ASPIRIN 325 MG ORAL TABLET 325 MG: 325 PILL ORAL at 08:06

## 2020-06-18 RX ADMIN — HEPARIN SODIUM 5000 UNITS: 5000 INJECTION INTRAVENOUS; SUBCUTANEOUS at 02:06

## 2020-06-18 RX ADMIN — FUROSEMIDE 80 MG: 80 TABLET ORAL at 08:06

## 2020-06-18 RX ADMIN — CEFTRIAXONE 2 G: 2 INJECTION, SOLUTION INTRAVENOUS at 09:06

## 2020-06-18 RX ADMIN — CARVEDILOL 12.5 MG: 25 TABLET, FILM COATED ORAL at 09:06

## 2020-06-18 RX ADMIN — CARVEDILOL 12.5 MG: 25 TABLET, FILM COATED ORAL at 08:06

## 2020-06-18 RX ADMIN — ACETAMINOPHEN 650 MG: 325 TABLET ORAL at 05:06

## 2020-06-18 RX ADMIN — LIDOCAINE 1 PATCH: 50 PATCH TOPICAL at 02:06

## 2020-06-18 RX ADMIN — RAMIPRIL 10 MG: 2.5 CAPSULE ORAL at 08:06

## 2020-06-18 NOTE — PROGRESS NOTES
06/18/20 0522   Vital Signs   Temp (!) 100.7 °F (38.2 °C)     SHERICE BERRIOS notified. Tylenol 650mg given

## 2020-06-18 NOTE — PROGRESS NOTES
"Hospital Medicine   Progress note      Team: [facility] [place] [facility] MED G Nieves Medrano MD   Admit Date: 6/15/2020   Hospital Day: 3  MARIIA: 6/19/2020   Code status: Full Code   Principal Problem: Erosion of pacemaker pocket due to and not concurrent with implantation of cardiac pacemaker     Summary:  Papito Bhakta is a 65 y.o. male with a PMHx of combined HF (EF 20%, G3DD), AICD in place, HLD, HTN, , chronic LLE edema due to venous statsis, who presents to the hospital as a transfer from [facility] for evaluation of AICD/pacemaker protrusion through skin.  The patient was in his usual state of health and states he had been cutting grass earlier in the day when he noted a "sticking" sensation near his pacemaker.  He subsequently took his shirt off and noted the device protruding through his skin prompting him to present to the OSH for evaluation.   he did note some drainage out of that area for about 1 week.  The patient denies any pain, numbness, weakness, fever, CP, SOB, or device discharges. He was transferred to [facility] for higher level of care.  AICD device easily seen protruding through the skin of the patient's left chest wall.  No drainage or erythema.  The patient is admitted to Hospital Medicine, EP consulted. ICD interrogation ordered to ensure that patient is not pacemaker dependent.  Patient underwent device extraction on 06/17, and tolerated the procedure well.  Id was consulted.  Patient started on vancomycin, ceftriaxone immediately after the procedure.    Interval hx:   Pt was seen and examined at bedside.  Patient tolerated his device extraction procedure well.  Overnight, he was noted to be afebrile with T-max of 100.7°.  He mentions at the time that he spiked a fever, he had several blankets on him and the AC in the room was turned off.  He was noted to be bradycardic with HR in the 50s.  Otherwise hemodynamically stable.  This morning, his only complaint is bilateral knee pain/chronic " arthralgia and myalgias.  He denies any chest pain, shortness of breath, palpitations, fever, chills, malaise, nausea, vomiting, diarrhea, urinary urgency, frequency, dysuria.  Will continue to follow intraoperative cultures.  Continue vancomycin/ceftriaxone.  Discussed further antibiotic management with Infectious Disease.    ROS (Positive in Bold, otherwise negative)  Constitutional: fever, chills, night sweats  CV: chest pain, edema, palpitations  Resp: SOB, cough, sputum production  GI: changes in appetite, NVDC, pain, melena, hematochezia, GERD, hematemesis  : Dysuria, hematuria, urinary urgency, frequency  MSK: arthralgia/myalgia, joint swelling  Neuro/Psych: anxiety, depression    PEx   Temp:  [97.5 °F (36.4 °C)-100.7 °F (38.2 °C)]   Pulse:  [48-68]   Resp:  [17-49]   BP: ()/(52-76)   SpO2:  [85 %-100 %]      I & O (Last 24H):     Intake/Output Summary (Last 24 hours) at 6/18/2020 1159  Last data filed at 6/18/2020 0600  Gross per 24 hour   Intake 700 ml   Output 1745 ml   Net -1045 ml       General:  male  in no acute distress. Nontoxic. Resting in bed. Cooperative.  HEENT: NCAT. PERRL. EOMI. Sclera Anicteric.  CVS: RRR. Normal S1 S2. No murmurs.  ICD device removed from left chest wall/Band-Aid over surgical site.  No erythema, drainage noted.  Pulm: CTAB. Normal respiratory effort. No wheezes, rhonchi, or crackles.  Abdomen: Soft. Non-distended. No tenderness to palpation. No rebound or guarding. +BS.  Extremities: +1 ble  edema. No cyanosis. Full ROM.  Neuro: Alert, oriented x 4, Spont mvt of all extremities with no focal deficits noted.    Recent Results (from the past 24 hour(s))   Culture, Anaerobe    Collection Time: 06/17/20  1:05 PM    Specimen: Heart; Incision site   Result Value Ref Range    Anaerobic Culture Culture in progress    Aerobic culture    Collection Time: 06/17/20  1:05 PM    Specimen: Heart; Incision site   Result Value Ref Range    Aerobic Bacterial Culture No  growth    Culture, Anaerobe    Collection Time: 06/17/20  1:10 PM    Specimen: Heart; Incision site   Result Value Ref Range    Anaerobic Culture Culture in progress    Aerobic culture    Collection Time: 06/17/20  1:10 PM    Specimen: Heart; Incision site   Result Value Ref Range    Aerobic Bacterial Culture No growth    Culture, Anaerobe    Collection Time: 06/17/20  1:20 PM    Specimen: Heart; Incision site   Result Value Ref Range    Anaerobic Culture Culture in progress    Aerobic culture    Collection Time: 06/17/20  1:20 PM    Specimen: Heart; Incision site   Result Value Ref Range    Aerobic Bacterial Culture No growth    Transesophageal echo (JASSON)    Collection Time: 06/17/20  1:37 PM   Result Value Ref Range    BSA 2.81 m2    TV area PISA 0.41 cm2    PISA TR VN Nyquist 0.36 m/s    PISA TR Radius 0.8 cm    TR Max Nicola 3.50 m/s    Triscuspid Valve Regurgitation Peak Gradient 49 mmHg   CBC auto differential    Collection Time: 06/18/20  4:20 AM   Result Value Ref Range    WBC 6.86 3.90 - 12.70 K/uL    RBC 4.70 4.60 - 6.20 M/uL    Hemoglobin 11.8 (L) 14.0 - 18.0 g/dL    Hematocrit 39.0 (L) 40.0 - 54.0 %    Mean Corpuscular Volume 83 82 - 98 fL    Mean Corpuscular Hemoglobin 25.1 (L) 27.0 - 31.0 pg    Mean Corpuscular Hemoglobin Conc 30.3 (L) 32.0 - 36.0 g/dL    RDW 13.8 11.5 - 14.5 %    Platelets 133 (L) 150 - 350 K/uL    MPV 12.2 9.2 - 12.9 fL    Immature Granulocytes 0.3 0.0 - 0.5 %    Gran # (ANC) 4.7 1.8 - 7.7 K/uL    Immature Grans (Abs) 0.02 0.00 - 0.04 K/uL    Lymph # 1.2 1.0 - 4.8 K/uL    Mono # 0.8 0.3 - 1.0 K/uL    Eos # 0.1 0.0 - 0.5 K/uL    Baso # 0.01 0.00 - 0.20 K/uL    nRBC 0 0 /100 WBC    Gran% 68.9 38.0 - 73.0 %    Lymph% 17.9 (L) 18.0 - 48.0 %    Mono% 11.5 4.0 - 15.0 %    Eosinophil% 1.3 0.0 - 8.0 %    Basophil% 0.1 0.0 - 1.9 %    Differential Method Automated    Comprehensive metabolic panel    Collection Time: 06/18/20  4:20 AM   Result Value Ref Range    Sodium 138 136 - 145 mmol/L     Potassium 4.7 3.5 - 5.1 mmol/L    Chloride 103 95 - 110 mmol/L    CO2 28 23 - 29 mmol/L    Glucose 88 70 - 110 mg/dL    BUN, Bld 16 8 - 23 mg/dL    Creatinine 1.3 0.5 - 1.4 mg/dL    Calcium 8.7 8.7 - 10.5 mg/dL    Total Protein 7.1 6.0 - 8.4 g/dL    Albumin 3.0 (L) 3.5 - 5.2 g/dL    Total Bilirubin 1.0 0.1 - 1.0 mg/dL    Alkaline Phosphatase 63 55 - 135 U/L    AST 13 10 - 40 U/L    ALT 6 (L) 10 - 44 U/L    Anion Gap 7 (L) 8 - 16 mmol/L    eGFR if African American >60.0 >60 mL/min/1.73 m^2    eGFR if non  57.3 (A) >60 mL/min/1.73 m^2   Magnesium    Collection Time: 06/18/20  4:20 AM   Result Value Ref Range    Magnesium 1.9 1.6 - 2.6 mg/dL   Phosphorus    Collection Time: 06/18/20  4:20 AM   Result Value Ref Range    Phosphorus 3.8 2.7 - 4.5 mg/dL   Lactic Acid, Plasma    Collection Time: 06/18/20  5:53 AM   Result Value Ref Range    Lactate (Lactic Acid) 0.8 0.5 - 2.2 mmol/L       No results for input(s): POCTGLUCOSE in the last 168 hours.    No results found for: HGBA1C     Active Hospital Problems    Diagnosis  POA    *Erosion of pacemaker pocket due to and not concurrent with implantation of cardiac pacemaker [T82.897S]  Not Applicable    NICM (nonischemic cardiomyopathy) [I42.8]  Yes     Chronic    Infection of biventricular implantable cardioverter-defibrillator (ICD) [T82.7XXA]  Yes    Pacemaker complications, initial encounter [T82.9XXA]  Yes    Hyperlipidemia [E78.5]  Yes     Chronic    Essential hypertension [I10]  Yes     Chronic    Chronic combined systolic and diastolic congestive heart failure [I50.42]  Yes      Resolved Hospital Problems   No resolved problems to display.          Assessment and Plan for problems addressed today:      aspirin  325 mg Oral Daily    carvediloL  12.5 mg Oral BID    cefTRIAXone (ROCEPHIN) IVPB  2 g Intravenous Q24H    furosemide  80 mg Oral Daily    lidocaine  1 patch Transdermal Q24H    pravastatin  80 mg Oral Daily    ramipriL  10 mg Oral  Daily    spironolactone  25 mg Oral Daily    vancomycin (VANCOCIN) IVPB  2,000 mg Intravenous Q12H     sodium chloride, sodium chloride, acetaminophen, acetaminophen, bupivacaine (PF) 0.25% (2.5 mg/ml), fentaNYL, ketorolac, lidocaine HCL 20 mg/ml (2%), ondansetron, ondansetron, ondansetron, sodium chloride 0.9%, Pharmacy to dose Vancomycin consult **AND** vancomycin - pharmacy to dose    Erosion of pacemaker pocket due to and not concurrent with implantation of cardiac pacemaker  -patient with combined systolic and diastolic congestive heart failure, s/p ICD BIV placement in 02/2019  -Consulted Electrophysiology for AICD evaluation/stabilization  -patient underwent device extraction on 06/17/2020  -infectious disease consulted, patient started on vancomycin and ceftriaxone  -Blood cultures from 06/15 with no growth to date  -continue to follow up intraoperative cultures from pocket and leads as obtained by EP  -Telemetry, Vitals per unit protocol  -monitor I/Os to r/o oliguria, end organ damage, maintain UOP   -will consult infectious disease for antibiotic recommendations.        Chronic combined systolic and diastolic congestive heart failure  -Most recent echo on (1/8/2019) reveals an EF of 10%, left ventricular diastolic dysfunction, severely reduced right ventricular systolic function  -Continue symptomatic management by diuresis with lasix 80 mg p.o. daily  -Cont asa 325mg, ramipril, pravastatin, Coreg 12.5 mg b.i.d.  -continue spironolactone given EF < 35%, NYHA class > II  -Continue to monitor on telemetry  -Monitor intake and output and obtain daily STANDING weights  -Fluid restriction to 1.5L per day and cardiac diet with salt restriction  -Supplemental O2 to keep Spo2 >90%, with PRN BiPAP for dyspnea/orthopnea  -Elevate head of bed     Essential hypertension  -Continue home ramipril, Coreg, spironolactone, Lasix  -monitor vitals q4h  -SBP goal of <160 in hospital      Hyperlipidemia  -Continue home  pravastatin 80 mg daily    Osteoarthritis:  -lidocaine patch for pain control  -OT/PT consult     DVT PPx:  Heparin    Discharge plan and follow up: DC home once medically stable     Nieves Medrano MD  Hospital Medicine Staff  [phone] pager

## 2020-06-18 NOTE — ANESTHESIA RELEASE NOTE
Anesthesia Release from PACU Note    Patient: Papito Bhakta    Procedure(s) Performed: Procedure(s) (LRB):  EXTRACTION, ELECTRODE LEAD (Left)  ECHOCARDIOGRAM,TRANSESOPHAGEAL (N/A)    Anesthesia type: general    Post pain: Adequate analgesia    Post assessment: no apparent anesthetic complications, tolerated procedure well and no evidence of recall    Last Vitals: There were no vitals taken for this visit.    Post vital signs: stable    Level of consciousness: awake, alert  and oriented    Nausea/Vomiting: no nausea/no vomiting    Complications: none    Airway Patency: patent    Respiratory: unassisted    Cardiovascular: stable and blood pressure at baseline    Hydration: euvolemic

## 2020-06-18 NOTE — SUBJECTIVE & OBJECTIVE
Interval History: No AEON.  Tmax 100.7, T current 97.4.  Blood cultures NGTD and lead cultures NGTD.  Was cold post sx and sweated but denies fever, chills or sweats.      Review of Systems   Constitutional: Negative for chills, diaphoresis and fever.   Respiratory: Negative for shortness of breath.    Cardiovascular: Negative for chest pain.   Gastrointestinal: Negative for abdominal pain, diarrhea, nausea and vomiting.   Genitourinary: Negative for dysuria and hematuria.   Skin: Positive for wound.     Objective:     Vital Signs (Most Recent):  Temp: 97.4 °F (36.3 °C) (06/18/20 1553)  Pulse: 70 (06/18/20 1553)  Resp: 18 (06/18/20 1553)  BP: 109/66 (06/18/20 1553)  SpO2: 99 % (06/18/20 1553) Vital Signs (24h Range):  Temp:  [97.4 °F (36.3 °C)-100.7 °F (38.2 °C)] 97.4 °F (36.3 °C)  Pulse:  [48-84] 70  Resp:  [17-33] 18  SpO2:  [85 %-100 %] 99 %  BP: ()/(52-76) 109/66     Weight: (!) 145.6 kg (321 lb)  Body mass index is 38.07 kg/m².    Estimated Creatinine Clearance: 89.5 mL/min (based on SCr of 1.3 mg/dL).    Physical Exam  Constitutional:       General: He is not in acute distress.     Appearance: He is well-developed. He is not diaphoretic.       HENT:      Head: Normocephalic and atraumatic.   Cardiovascular:      Rate and Rhythm: Normal rate and regular rhythm.      Heart sounds: Normal heart sounds. No murmur. No friction rub. No gallop.    Pulmonary:      Effort: Pulmonary effort is normal. No respiratory distress.      Breath sounds: Normal breath sounds. No wheezing or rales.   Abdominal:      General: Bowel sounds are normal. There is no distension.      Palpations: Abdomen is soft. There is no mass.      Tenderness: There is no abdominal tenderness. There is no guarding or rebound.   Skin:     General: Skin is warm and dry.   Neurological:      Mental Status: He is alert and oriented to person, place, and time.   Psychiatric:         Behavior: Behavior normal.         Significant Labs:   Blood  Culture:   Recent Labs   Lab 06/16/20  0031   LABBLOO No Growth to date  No Growth to date  No Growth to date  No Growth to date  No Growth to date  No Growth to date     CBC:   Recent Labs   Lab 06/17/20  1013 06/17/20  1130 06/18/20  0420   WBC 7.01  --  6.86   HGB 11.7*  --  11.8*   HCT 38.2* 36 39.0*     --  133*     CMP:   Recent Labs   Lab 06/17/20  1013 06/18/20  0420    138   K 4.4 4.7    103   CO2 22* 28    88   BUN 15 16   CREATININE 1.1 1.3   CALCIUM 8.8 8.7   PROT 7.1 7.1   ALBUMIN 3.2* 3.0*   BILITOT 0.9 1.0   ALKPHOS 65 63   AST 14 13   ALT 9* 6*   ANIONGAP 8 7*   EGFRNONAA >60.0 57.3*     Wound Culture:   Recent Labs   Lab 06/17/20  1305 06/17/20  1310 06/17/20  1320   LABAERO No growth No growth No growth     All pertinent labs within the past 24 hours have been reviewed.    Significant Imaging: I have reviewed all pertinent imaging results/findings within the past 24 hours.

## 2020-06-18 NOTE — ASSESSMENT & PLAN NOTE
- ICD infection given erosion through skin  - no surrounding or systemic signs of infection  - ICD and leads extracted - Lead CXs NGTD  - No pocket cultures done  - on vanc and CTX  - Intra Op JASSON - no mention of vegetation    Plan:  - continue Vanc and Ceftriaxone  - fu cultures   - if no growth then can replace on opposite side in 3 days  - will follow

## 2020-06-18 NOTE — PROGRESS NOTES
Pt transferred from PACU by transport techs to room 324. BiLAT groin sites checked and arm immobilized from ICD extraction. Meds given. Pt oriented to room and unit. Bed in lowest position with siderails up x2. Call bell within reach; pt instructed to call for assistance.  Pt updated with POC.  Will continue to monitor.

## 2020-06-18 NOTE — PROGRESS NOTES
"[facility]  Infectious Disease  Progress Note    Patient Name: Papito Bhakta  MRN: [id number]  Admission Date: 6/15/2020  Length of Stay: 3 days  Attending Physician: Nieves Medrano MD  Primary Care Provider: Brynn To MD    Isolation Status: No active isolations  Assessment/Plan:      * Erosion of pacemaker pocket due to and not concurrent with implantation of cardiac pacemaker  - ICD infection given erosion through skin  - no surrounding or systemic signs of infection  - ICD and leads extracted - Lead CXs NGTD  - No pocket cultures done  - on vanc and CTX  - Intra Op JASSON - no mention of vegetation    Plan:  - continue Vanc and Ceftriaxone  - fu cultures   - if no growth then can replace on opposite side in 3 days  - will follow        Anticipated Disposition: tbd    Thank you for your consult. I will follow-up with patient. Please contact us if you have any additional questions.    YASH Marie  Infectious Disease  [facility]    Subjective:     Principal Problem:Erosion of pacemaker pocket due to and not concurrent with implantation of cardiac pacemaker    HPI: Papito Bhakta is a 65 y.o. male with a significant medical history of CHF, HLD, HTN, s/p AICD placement who presents to the hospital as a transfer from [facility] for evaluation of AICD/pacemaker protrusion through skin.  The patient was in his usual state of health and states he had been cutting grass earlier in the day when he noted a "sticking" sensation near his pacemaker.  He subsequently took his shirt off and noted the device protruding through his skin prompting him to present to the OSH for evaluation.  The patient denies any pain, numbness, weakness, fever, CP, SOB, or device discharges.  He did not report anything that exacerbated or alleviated his symptom.  He was transferred to [facility] for higher level of care.  AICD device was easily seen protruding through the skin of " the patient's left chest wall.  No drainage or erythema noted.  The patient was admitted to Hospital Medicine.    Interval History:  ID consulted for ABX recs.  Patient has been afebrile and WBC WNL.  CRP WNL and procalcitonin 0.04. Blood cultures NGTD.  The patient denies any recent fever, chills, or sweats.  He is to undergo removal 6/17.  He is not currently on abx.         Interval History: No AEON.  Tmax 100.7, T current 97.4.  Blood cultures NGTD and lead cultures NGTD.  Was cold post sx and sweated but denies fever, chills or sweats.      Review of Systems   Constitutional: Negative for chills, diaphoresis and fever.   Respiratory: Negative for shortness of breath.    Cardiovascular: Negative for chest pain.   Gastrointestinal: Negative for abdominal pain, diarrhea, nausea and vomiting.   Genitourinary: Negative for dysuria and hematuria.   Skin: Positive for wound.     Objective:     Vital Signs (Most Recent):  Temp: 97.4 °F (36.3 °C) (06/18/20 1553)  Pulse: 70 (06/18/20 1553)  Resp: 18 (06/18/20 1553)  BP: 109/66 (06/18/20 1553)  SpO2: 99 % (06/18/20 1553) Vital Signs (24h Range):  Temp:  [97.4 °F (36.3 °C)-100.7 °F (38.2 °C)] 97.4 °F (36.3 °C)  Pulse:  [48-84] 70  Resp:  [17-33] 18  SpO2:  [85 %-100 %] 99 %  BP: ()/(52-76) 109/66     Weight: (!) 145.6 kg (321 lb)  Body mass index is 38.07 kg/m².    Estimated Creatinine Clearance: 89.5 mL/min (based on SCr of 1.3 mg/dL).    Physical Exam  Constitutional:       General: He is not in acute distress.     Appearance: He is well-developed. He is not diaphoretic.       HENT:      Head: Normocephalic and atraumatic.   Cardiovascular:      Rate and Rhythm: Normal rate and regular rhythm.      Heart sounds: Normal heart sounds. No murmur. No friction rub. No gallop.    Pulmonary:      Effort: Pulmonary effort is normal. No respiratory distress.      Breath sounds: Normal breath sounds. No wheezing or rales.   Abdominal:      General: Bowel sounds are normal.  There is no distension.      Palpations: Abdomen is soft. There is no mass.      Tenderness: There is no abdominal tenderness. There is no guarding or rebound.   Skin:     General: Skin is warm and dry.   Neurological:      Mental Status: He is alert and oriented to person, place, and time.   Psychiatric:         Behavior: Behavior normal.         Significant Labs:   Blood Culture:   Recent Labs   Lab 06/16/20  0031   LABBLOO No Growth to date  No Growth to date  No Growth to date  No Growth to date  No Growth to date  No Growth to date     CBC:   Recent Labs   Lab 06/17/20  1013 06/17/20  1130 06/18/20  0420   WBC 7.01  --  6.86   HGB 11.7*  --  11.8*   HCT 38.2* 36 39.0*     --  133*     CMP:   Recent Labs   Lab 06/17/20  1013 06/18/20  0420    138   K 4.4 4.7    103   CO2 22* 28    88   BUN 15 16   CREATININE 1.1 1.3   CALCIUM 8.8 8.7   PROT 7.1 7.1   ALBUMIN 3.2* 3.0*   BILITOT 0.9 1.0   ALKPHOS 65 63   AST 14 13   ALT 9* 6*   ANIONGAP 8 7*   EGFRNONAA >60.0 57.3*     Wound Culture:   Recent Labs   Lab 06/17/20  1305 06/17/20  1310 06/17/20  1320   LABAERO No growth No growth No growth     All pertinent labs within the past 24 hours have been reviewed.    Significant Imaging: I have reviewed all pertinent imaging results/findings within the past 24 hours.

## 2020-06-18 NOTE — ANESTHESIA POSTPROCEDURE EVALUATION
Anesthesia Post Evaluation    Patient: Papito Bhakta    Procedure(s) Performed: Procedure(s) (LRB):  EXTRACTION, ELECTRODE LEAD (Left)  ECHOCARDIOGRAM,TRANSESOPHAGEAL (N/A)    Final Anesthesia Type: general    Patient location during evaluation: PACU  Patient participation: Yes- Able to Participate  Level of consciousness: awake and alert and oriented  Post-procedure vital signs: reviewed and stable  Pain management: adequate  Airway patency: patent    PONV status at discharge: No PONV  Anesthetic complications: no      Cardiovascular status: blood pressure returned to baseline  Respiratory status: unassisted  Hydration status: euvolemic  Follow-up not needed.          Vitals Value Taken Time   /69 06/17/20 2115   Temp 36.6 °C (97.9 °F) 06/17/20 2100   Pulse 58 06/17/20 2115   Resp 18 06/17/20 2115   SpO2 100 % 06/17/20 2115         No case tracking events are documented in the log.      Pain/Shira Score: Shira Score: 8 (6/17/2020  8:00 PM)

## 2020-06-18 NOTE — NURSING TRANSFER
Nursing Transfer Note      6/17/2020     Transfer To: 324    Transfer via bed    Transfer with 2LO2 nasal canula,  to O2, cardiac monitoring    Transported by transport    Medicines sent: NA    Chart send with patient: Yes    Patient reassessed at: 06/17/20 @8701

## 2020-06-19 PROBLEM — T82.7XXA INFECTION OF PACEMAKER POCKET: Status: ACTIVE | Noted: 2020-06-15

## 2020-06-19 LAB
ALBUMIN SERPL BCP-MCNC: 2.6 G/DL (ref 3.5–5.2)
ALP SERPL-CCNC: 58 U/L (ref 55–135)
ALT SERPL W/O P-5'-P-CCNC: 7 U/L (ref 10–44)
ANION GAP SERPL CALC-SCNC: 7 MMOL/L (ref 8–16)
AST SERPL-CCNC: 12 U/L (ref 10–40)
BASOPHILS # BLD AUTO: 0.02 K/UL (ref 0–0.2)
BASOPHILS NFR BLD: 0.3 % (ref 0–1.9)
BILIRUB SERPL-MCNC: 0.7 MG/DL (ref 0.1–1)
BUN SERPL-MCNC: 16 MG/DL (ref 8–23)
CALCIUM SERPL-MCNC: 8.3 MG/DL (ref 8.7–10.5)
CHLORIDE SERPL-SCNC: 104 MMOL/L (ref 95–110)
CO2 SERPL-SCNC: 26 MMOL/L (ref 23–29)
CREAT SERPL-MCNC: 1.1 MG/DL (ref 0.5–1.4)
DIFFERENTIAL METHOD: ABNORMAL
EOSINOPHIL # BLD AUTO: 0.1 K/UL (ref 0–0.5)
EOSINOPHIL NFR BLD: 1.2 % (ref 0–8)
ERYTHROCYTE [DISTWIDTH] IN BLOOD BY AUTOMATED COUNT: 13.6 % (ref 11.5–14.5)
EST. GFR  (AFRICAN AMERICAN): >60 ML/MIN/1.73 M^2
EST. GFR  (NON AFRICAN AMERICAN): >60 ML/MIN/1.73 M^2
GLUCOSE SERPL-MCNC: 133 MG/DL (ref 70–110)
HCT VFR BLD AUTO: 35.5 % (ref 40–54)
HGB BLD-MCNC: 10.5 G/DL (ref 14–18)
IMM GRANULOCYTES # BLD AUTO: 0.01 K/UL (ref 0–0.04)
IMM GRANULOCYTES NFR BLD AUTO: 0.2 % (ref 0–0.5)
LYMPHOCYTES # BLD AUTO: 1.4 K/UL (ref 1–4.8)
LYMPHOCYTES NFR BLD: 20.8 % (ref 18–48)
MAGNESIUM SERPL-MCNC: 1.8 MG/DL (ref 1.6–2.6)
MCH RBC QN AUTO: 25.2 PG (ref 27–31)
MCHC RBC AUTO-ENTMCNC: 29.6 G/DL (ref 32–36)
MCV RBC AUTO: 85 FL (ref 82–98)
MONOCYTES # BLD AUTO: 0.9 K/UL (ref 0.3–1)
MONOCYTES NFR BLD: 14.2 % (ref 4–15)
NEUTROPHILS # BLD AUTO: 4.1 K/UL (ref 1.8–7.7)
NEUTROPHILS NFR BLD: 63.3 % (ref 38–73)
NRBC BLD-RTO: 0 /100 WBC
PHOSPHATE SERPL-MCNC: 3.1 MG/DL (ref 2.7–4.5)
PLATELET # BLD AUTO: 109 K/UL (ref 150–350)
PMV BLD AUTO: 13 FL (ref 9.2–12.9)
POCT GLUCOSE: 118 MG/DL (ref 70–110)
POTASSIUM SERPL-SCNC: 3.8 MMOL/L (ref 3.5–5.1)
PROT SERPL-MCNC: 6.3 G/DL (ref 6–8.4)
RBC # BLD AUTO: 4.17 M/UL (ref 4.6–6.2)
SODIUM SERPL-SCNC: 137 MMOL/L (ref 136–145)
VANCOMYCIN TROUGH SERPL-MCNC: 18.7 UG/ML (ref 10–22)
WBC # BLD AUTO: 6.49 K/UL (ref 3.9–12.7)

## 2020-06-19 PROCEDURE — 84100 ASSAY OF PHOSPHORUS: CPT | Mod: HCNC

## 2020-06-19 PROCEDURE — 97530 THERAPEUTIC ACTIVITIES: CPT | Mod: HCNC

## 2020-06-19 PROCEDURE — 83735 ASSAY OF MAGNESIUM: CPT | Mod: HCNC

## 2020-06-19 PROCEDURE — 63700000 PHARM REV CODE 250 ALT 637 W/O HCPCS: Mod: HCNC | Performed by: HOSPITALIST

## 2020-06-19 PROCEDURE — 94761 N-INVAS EAR/PLS OXIMETRY MLT: CPT | Mod: HCNC

## 2020-06-19 PROCEDURE — 76937 US GUIDE VASCULAR ACCESS: CPT | Mod: HCNC

## 2020-06-19 PROCEDURE — 20600001 HC STEP DOWN PRIVATE ROOM: Mod: HCNC

## 2020-06-19 PROCEDURE — 97162 PT EVAL MOD COMPLEX 30 MIN: CPT | Mod: HCNC

## 2020-06-19 PROCEDURE — 63600175 PHARM REV CODE 636 W HCPCS: Mod: HCNC | Performed by: HOSPITALIST

## 2020-06-19 PROCEDURE — 99232 SBSQ HOSP IP/OBS MODERATE 35: CPT | Mod: HCNC,,, | Performed by: HOSPITALIST

## 2020-06-19 PROCEDURE — 85025 COMPLETE CBC W/AUTO DIFF WBC: CPT | Mod: HCNC

## 2020-06-19 PROCEDURE — 25000003 PHARM REV CODE 250: Mod: HCNC | Performed by: HOSPITALIST

## 2020-06-19 PROCEDURE — 36415 COLL VENOUS BLD VENIPUNCTURE: CPT | Mod: HCNC

## 2020-06-19 PROCEDURE — 80202 ASSAY OF VANCOMYCIN: CPT | Mod: HCNC

## 2020-06-19 PROCEDURE — 99233 PR SUBSEQUENT HOSPITAL CARE,LEVL III: ICD-10-PCS | Mod: HCNC,,, | Performed by: PHYSICIAN ASSISTANT

## 2020-06-19 PROCEDURE — 94799 UNLISTED PULMONARY SVC/PX: CPT | Mod: HCNC

## 2020-06-19 PROCEDURE — 99232 PR SUBSEQUENT HOSPITAL CARE,LEVL II: ICD-10-PCS | Mod: HCNC,,, | Performed by: HOSPITALIST

## 2020-06-19 PROCEDURE — 97166 OT EVAL MOD COMPLEX 45 MIN: CPT | Mod: HCNC

## 2020-06-19 PROCEDURE — 99233 SBSQ HOSP IP/OBS HIGH 50: CPT | Mod: HCNC,,, | Performed by: PHYSICIAN ASSISTANT

## 2020-06-19 PROCEDURE — C1751 CATH, INF, PER/CENT/MIDLINE: HCPCS | Mod: HCNC

## 2020-06-19 PROCEDURE — 27000646 HC AEROBIKA DEVICE: Mod: HCNC

## 2020-06-19 PROCEDURE — 27000221 HC OXYGEN, UP TO 24 HOURS: Mod: HCNC

## 2020-06-19 PROCEDURE — 25000003 PHARM REV CODE 250: Mod: HCNC | Performed by: NURSE PRACTITIONER

## 2020-06-19 PROCEDURE — 94664 DEMO&/EVAL PT USE INHALER: CPT | Mod: HCNC

## 2020-06-19 PROCEDURE — 25000003 PHARM REV CODE 250: Mod: HCNC | Performed by: STUDENT IN AN ORGANIZED HEALTH CARE EDUCATION/TRAINING PROGRAM

## 2020-06-19 PROCEDURE — 80053 COMPREHEN METABOLIC PANEL: CPT | Mod: HCNC

## 2020-06-19 PROCEDURE — 36573 INSJ PICC RS&I 5 YR+: CPT | Mod: HCNC

## 2020-06-19 PROCEDURE — 99900035 HC TECH TIME PER 15 MIN (STAT): Mod: HCNC

## 2020-06-19 PROCEDURE — 97535 SELF CARE MNGMENT TRAINING: CPT | Mod: HCNC

## 2020-06-19 RX ORDER — LIDOCAINE 50 MG/G
1 PATCH TOPICAL DAILY
Qty: 30 PATCH | Refills: 0 | Status: ON HOLD | OUTPATIENT
Start: 2020-06-19 | End: 2020-09-30

## 2020-06-19 RX ORDER — LIDOCAINE HYDROCHLORIDE 10 MG/ML
1 INJECTION INFILTRATION; PERINEURAL ONCE AS NEEDED
Status: DISCONTINUED | OUTPATIENT
Start: 2020-06-19 | End: 2020-06-20 | Stop reason: HOSPADM

## 2020-06-19 RX ORDER — GABAPENTIN 100 MG/1
100 CAPSULE ORAL 3 TIMES DAILY
Status: DISCONTINUED | OUTPATIENT
Start: 2020-06-19 | End: 2020-06-19

## 2020-06-19 RX ORDER — GABAPENTIN 100 MG/1
100 CAPSULE ORAL 3 TIMES DAILY
Qty: 90 CAPSULE | Refills: 1 | Status: ON HOLD | OUTPATIENT
Start: 2020-06-19 | End: 2020-09-30

## 2020-06-19 RX ORDER — SODIUM CHLORIDE 0.9 % (FLUSH) 0.9 %
10 SYRINGE (ML) INJECTION
Status: DISCONTINUED | OUTPATIENT
Start: 2020-06-19 | End: 2020-06-20 | Stop reason: HOSPADM

## 2020-06-19 RX ORDER — GABAPENTIN 100 MG/1
100 CAPSULE ORAL 3 TIMES DAILY
Status: DISCONTINUED | OUTPATIENT
Start: 2020-06-19 | End: 2020-06-20 | Stop reason: HOSPADM

## 2020-06-19 RX ORDER — SODIUM CHLORIDE 0.9 % (FLUSH) 0.9 %
10 SYRINGE (ML) INJECTION EVERY 6 HOURS
Status: DISCONTINUED | OUTPATIENT
Start: 2020-06-19 | End: 2020-06-20 | Stop reason: HOSPADM

## 2020-06-19 RX ORDER — POTASSIUM CHLORIDE 20 MEQ/15ML
20 SOLUTION ORAL ONCE
Status: COMPLETED | OUTPATIENT
Start: 2020-06-19 | End: 2020-06-19

## 2020-06-19 RX ORDER — CARVEDILOL 12.5 MG/1
12.5 TABLET ORAL 2 TIMES DAILY
Qty: 60 TABLET | Refills: 11 | Status: ON HOLD | OUTPATIENT
Start: 2020-06-19 | End: 2020-10-11 | Stop reason: SDUPTHER

## 2020-06-19 RX ADMIN — HEPARIN SODIUM 5000 UNITS: 5000 INJECTION INTRAVENOUS; SUBCUTANEOUS at 09:06

## 2020-06-19 RX ADMIN — HEPARIN SODIUM 5000 UNITS: 5000 INJECTION INTRAVENOUS; SUBCUTANEOUS at 03:06

## 2020-06-19 RX ADMIN — POTASSIUM CHLORIDE 20 MEQ: 20 SOLUTION ORAL at 11:06

## 2020-06-19 RX ADMIN — SPIRONOLACTONE 25 MG: 25 TABLET ORAL at 08:06

## 2020-06-19 RX ADMIN — GABAPENTIN 100 MG: 100 CAPSULE ORAL at 03:06

## 2020-06-19 RX ADMIN — LIDOCAINE 1 PATCH: 50 PATCH TOPICAL at 03:06

## 2020-06-19 RX ADMIN — HEPARIN SODIUM 5000 UNITS: 5000 INJECTION INTRAVENOUS; SUBCUTANEOUS at 05:06

## 2020-06-19 RX ADMIN — CARVEDILOL 12.5 MG: 25 TABLET, FILM COATED ORAL at 08:06

## 2020-06-19 RX ADMIN — GABAPENTIN 100 MG: 100 CAPSULE ORAL at 08:06

## 2020-06-19 RX ADMIN — CEFTRIAXONE 2 G: 2 INJECTION, SOLUTION INTRAVENOUS at 09:06

## 2020-06-19 RX ADMIN — DEXTROSE MONOHYDRATE 2000 MG: 50 INJECTION, SOLUTION INTRAVENOUS at 03:06

## 2020-06-19 RX ADMIN — MAGNESIUM SULFATE HEPTAHYDRATE 1 G: 500 INJECTION, SOLUTION INTRAMUSCULAR; INTRAVENOUS at 12:06

## 2020-06-19 RX ADMIN — PRAVASTATIN SODIUM 80 MG: 40 TABLET ORAL at 08:06

## 2020-06-19 RX ADMIN — ASPIRIN 325 MG ORAL TABLET 325 MG: 325 PILL ORAL at 08:06

## 2020-06-19 RX ADMIN — FUROSEMIDE 80 MG: 80 TABLET ORAL at 08:06

## 2020-06-19 RX ADMIN — RAMIPRIL 10 MG: 2.5 CAPSULE ORAL at 08:06

## 2020-06-19 NOTE — SUBJECTIVE & OBJECTIVE
Interval History: No AEON. Afebrile and WBC WNL.   Blood cultures NGTD and lead cultures NGTD.  Denies fever, chills or sweats.      Review of Systems   Constitutional: Negative for chills, diaphoresis and fever.   Respiratory: Negative for shortness of breath.    Cardiovascular: Negative for chest pain.   Gastrointestinal: Negative for abdominal pain, diarrhea, nausea and vomiting.   Genitourinary: Negative for dysuria and hematuria.   Skin: Positive for wound.     Objective:     Vital Signs (Most Recent):  Temp: 97.8 °F (36.6 °C) (06/19/20 1133)  Pulse: (!) 53 (06/19/20 1509)  Resp: 20 (06/19/20 1133)  BP: (!) 89/53 (06/19/20 1133)  SpO2: (!) 93 % (06/19/20 1133) Vital Signs (24h Range):  Temp:  [97.4 °F (36.3 °C)-99.5 °F (37.5 °C)] 97.8 °F (36.6 °C)  Pulse:  [52-73] 53  Resp:  [18-20] 20  SpO2:  [93 %-99 %] 93 %  BP: ()/(53-66) 89/53     Weight: (!) 145.6 kg (321 lb)  Body mass index is 38.07 kg/m².    Estimated Creatinine Clearance: 105.8 mL/min (based on SCr of 1.1 mg/dL).    Physical Exam  Constitutional:       General: He is not in acute distress.     Appearance: He is well-developed. He is not diaphoretic.       HENT:      Head: Normocephalic and atraumatic.   Cardiovascular:      Rate and Rhythm: Normal rate and regular rhythm.      Heart sounds: Normal heart sounds. No murmur. No friction rub. No gallop.    Pulmonary:      Effort: Pulmonary effort is normal. No respiratory distress.      Breath sounds: Normal breath sounds. No wheezing or rales.   Abdominal:      General: Bowel sounds are normal. There is no distension.      Palpations: Abdomen is soft. There is no mass.      Tenderness: There is no abdominal tenderness. There is no guarding or rebound.   Skin:     General: Skin is warm and dry.   Neurological:      Mental Status: He is alert and oriented to person, place, and time.   Psychiatric:         Behavior: Behavior normal.         Significant Labs:   Blood Culture:   Recent Labs   Lab  06/16/20  0031   LABBLOO No Growth to date  No Growth to date  No Growth to date  No Growth to date  No Growth to date  No Growth to date  No Growth to date  No Growth to date     CBC:   Recent Labs   Lab 06/18/20  0420 06/19/20  0454   WBC 6.86 6.49   HGB 11.8* 10.5*   HCT 39.0* 35.5*   * 109*     CMP:   Recent Labs   Lab 06/18/20  0420 06/19/20  0454    137   K 4.7 3.8    104   CO2 28 26   GLU 88 133*   BUN 16 16   CREATININE 1.3 1.1   CALCIUM 8.7 8.3*   PROT 7.1 6.3   ALBUMIN 3.0* 2.6*   BILITOT 1.0 0.7   ALKPHOS 63 58   AST 13 12   ALT 6* 7*   ANIONGAP 7* 7*   EGFRNONAA 57.3* >60.0     Wound Culture:   Recent Labs   Lab 06/17/20  1305 06/17/20  1310 06/17/20  1320   LABAERO No growth No growth No growth     All pertinent labs within the past 24 hours have been reviewed.    Significant Imaging: I have reviewed all pertinent imaging results/findings within the past 24 hours.

## 2020-06-19 NOTE — PROGRESS NOTES
Pharmacokinetic Assessment Follow Up: IV Vancomycin    Vancomycin serum concentration assessment(s):    The trough level was drawn correctly and can be used to guide therapy at this time. The measurement is within the desired definitive target range of 10 to 20 mcg/mL.    Vancomycin Regimen Plan:    Continue regimen to Vancomycin 2000 mg IV every 12 hours with next serum trough concentration measured at 1400 prior to 4th dose on 6/21    Drug levels (last 3 results):  Recent Labs   Lab Result Units 06/19/20  1408   Vancomycin-Trough ug/mL 18.7       Pharmacy will continue to follow and monitor vancomycin.    Please contact pharmacy at extension [phone] for questions regarding this assessment.    Thank you for the consult,   Lillie Smith       Patient brief summary:  Papito Bhakta is a 65 y.o. male initiated on antimicrobial therapy with IV Vancomycin for treatment of skin & soft tissue infection    The patient's current regimen is Vancomycin 2000 mg q12h    Drug Allergies:   Review of patient's allergies indicates:  No Known Allergies    Actual Body Weight:   145.6 kg    Renal Function:   Estimated Creatinine Clearance: 105.8 mL/min (based on SCr of 1.1 mg/dL).,     Dialysis Method (if applicable):  N/A    CBC (last 72 hours):  Recent Labs   Lab Result Units 06/17/20  1013 06/18/20  0420 06/19/20  0454   WBC K/uL 7.01 6.86 6.49   Hemoglobin g/dL 11.7* 11.8* 10.5*   Hematocrit % 38.2* 39.0* 35.5*   Platelets K/uL 160 133* 109*   Gran% % 67.6 68.9 63.3   Lymph% % 17.8* 17.9* 20.8   Mono% % 12.0 11.5 14.2   Eosinophil% % 1.9 1.3 1.2   Basophil% % 0.4 0.1 0.3   Differential Method  Automated Automated Automated       Metabolic Panel (last 72 hours):  Recent Labs   Lab Result Units 06/17/20  1013 06/18/20  0420 06/19/20  0454   Sodium mmol/L 138 138 137   Potassium mmol/L 4.4 4.7 3.8   Chloride mmol/L 108 103 104   CO2 mmol/L 22* 28 26   Glucose mg/dL 100 88 133*   BUN, Bld mg/dL 15 16 16   Creatinine mg/dL 1.1 1.3 1.1    Albumin g/dL 3.2* 3.0* 2.6*   Total Bilirubin mg/dL 0.9 1.0 0.7   Alkaline Phosphatase U/L 65 63 58   AST U/L 14 13 12   ALT U/L 9* 6* 7*   Magnesium mg/dL 1.8 1.9 1.8   Phosphorus mg/dL 3.4 3.8 3.1       Vancomycin Administrations:  vancomycin given in the last 96 hours                   vancomycin 2 g in dextrose 5 % 500 mL IVPB (mg) 2,000 mg New Bag 06/19/20 1530     2,000 mg New Bag  0330     2,000 mg New Bag 06/18/20 1430    vancomycin (VANCOCIN) 2,500 mg in dextrose 5 % 500 mL IVPB (mg) 2,500 mg New Bag 06/17/20 2345                Microbiologic Results:  Microbiology Results (last 7 days)     Procedure Component Value Units Date/Time    Culture, Anaerobe [[id number]] Collected: 06/17/20 1320    Order Status: Completed Specimen: Incision site from Heart Updated: 06/19/20 0930     Anaerobic Culture Culture in progress    Narrative:      RV lead tip    Culture, Anaerobe [[id number]] Collected: 06/17/20 1310    Order Status: Completed Specimen: Incision site from Heart Updated: 06/19/20 0929     Anaerobic Culture Culture in progress    Narrative:      RA lead tip    Culture, Anaerobe [[id number]] Collected: 06/17/20 1305    Order Status: Completed Specimen: Incision site from Heart Updated: 06/19/20 0929     Anaerobic Culture Culture in progress    Narrative:      LV lead tip    Blood culture (site 1) [[id number]] Collected: 06/16/20 0031    Order Status: Completed Specimen: Blood Updated: 06/19/20 0614     Blood Culture, Routine No Growth to date      No Growth to date      No Growth to date      No Growth to date    Narrative:      Site # 1, aerobic and anaerobic (central line, if patient has  one)    Blood culture (site 2) [[id number]] Collected: 06/16/20 0031    Order Status: Completed Specimen: Blood Updated: 06/19/20 0614     Blood Culture, Routine No Growth to date      No Growth to date      No Growth to date      No Growth to date    Narrative:      Site # 2, aerobic only    Aerobic culture [[id number]]  Collected: 06/17/20 1320    Order Status: Completed Specimen: Incision site from Heart Updated: 06/18/20 0730     Aerobic Bacterial Culture No growth    Narrative:      RV lead tip    Aerobic culture [[id number]] Collected: 06/17/20 1305    Order Status: Completed Specimen: Incision site from Heart Updated: 06/18/20 0730     Aerobic Bacterial Culture No growth    Narrative:      LV lead tip    Aerobic culture [[id number]] Collected: 06/17/20 1310    Order Status: Completed Specimen: Incision site from Heart Updated: 06/18/20 0730     Aerobic Bacterial Culture No growth    Narrative:      RA lead tip

## 2020-06-19 NOTE — PT/OT/SLP EVAL
Physical Therapy Evaluation    Patient Name:  Papito Bhakta   MRN:  [id number]  Admit Date: 6/15/2020  Admitting Diagnosis:  Erosion of pacemaker pocket due to and not concurrent with implantation of cardiac pacemaker   Length of Stay: 4 days  Recent Surgery: Procedure(s) (LRB):  EXTRACTION, ELECTRODE LEAD (Left)  ECHOCARDIOGRAM,TRANSESOPHAGEAL (N/A) 2 Days Post-Op    Recommendations:     Discharge Recommendations:  home health PT   Discharge Equipment Recommendations: other (see comments)(tbd pending pain mgmt and discharge dispo)   Barriers to discharge: new onset of pain limiting function    Assessment:     Papito Bhakta is a 65 y.o. male admitted with a medical diagnosis of Erosion of pacemaker pocket due to and not concurrent with implantation of cardiac pacemaker.  He presents with the following impairments/functional limitations: impaired endurance, impaired self care skills, impaired functional mobilty, gait instability, pain, impaired skin, impaired cardiopulmonary response to activity, decreased lower extremity function, decreased ROM, impaired joint extensibility. Pt tolerated initial evaluation fair today. Pt demonstrating decent functional strength on this date but is unable to perform any mobility due to new onset of severe, bilateral foot pain. Pt describes pain as burning, sharp pain that increases with wbing through both feet. Due to this pt is functioning below baseline and will continue to do so until pain levels decrease or subside completely. Both RN and MD notified of findings on this date and need for finding of appropriate pain management regimen. Pt will continue to benefit from skilled PT services during this hospital admit to continue to improve transfer ability and efficiency as well as continue to progress pt's ambulation distance and cardiopulmonary endurance to maximize pt's functional independence and return to PLOF. After discharge pt will benefit from HHPT to further progress  functional mobility and cardiopulmonary endurance as well as for home safety and energy conservation techniques.    Rehab Prognosis: Good; patient would benefit from acute skilled PT services to address these deficits and reach maximum level of function.      Plan:     During this hospitalization, patient to be seen 4 x/week to address the identified rehab impairments via gait training, therapeutic activities, therapeutic exercises, neuromuscular re-education and progress towards the established goals.    · Plan of Care Expires:  07/19/20    Subjective     RN notified prior to session. No family present upon PT entrance into room.    Chief Complaint: Pt reporting new onset foot pain in BLE described as burning, sharp pain  Patient/Family Comments/goals: pt wants to go home  Pain/Comfort:  · Pain Rating 1: other (see comments)(did not rate but severe burning pain reported with standing)  · Location - Side 1: Bilateral  · Location 1: foot  · Pain Addressed 1: Pre-medicate for activity, Reposition, Distraction, Cessation of Activity, Nurse notified(MD notified)  · Pain Rating Post-Intervention 1: other (see comments)(did not rate but overall decrease in pain when not weightbearing)    Living Environment:  Patient lives with daughter and victor hugo (6 y.o.) in a single family home with 0 SHIRA with tub/shower.   Prior Level of Function: Patient reports being independent with mobility & with ADLs. Pt reports only using RW when he is having a bad day pain wise for both of his knees. Patient uses DME as follows: walker, rolling, raised toilet. DME owned (not currently used): none.  Roles/Repsonsibilities: Hand Dominance: left Work: no. Drive: no. Managing Medicines/Managing Home: yes.     Patient reports they will have assistance from daughter upon discharge.    Objective:     Additional staff present: OT for co-evaluation    Patient found HOB elevated with: telemetry, oxygen(life vest)     General Precautions: Standard,  Cardiac fall   Orthopedic Precautions:N/A   Braces: N/A   Body mass index is 38.07 kg/m².  Oxygen Device: Nasal Cannula 1L    Exams:  · Mental Status: Patient is AxOx4 and follows all multi-step verbal commands. Pt is Alert and Cooperative during session.  · Skin Integrity: Visible skin intact - dried, cracked skin noted bilateral shins to feet  · Edema: yes - with Rt > Lt   · Sensation: Intact  · Hearing: Intact  · Vision:  Intact  · Postural Assessment: slouched posture and rounded shoulders  · Range of Motion:  · RUE: WFL  · LUE: WFL  · RLE: WFL  · LLE: WFL  · Strength Exam:  · Lower Extremity Strength: BLE WFL  · Ankle DF/PF not tested due to pt's c/o pain with ankle ROM    Outcome Measures:  AM-PAC 6 CLICK MOBILITY  Turning over in bed (including adjusting bedclothes, sheets and blankets)?: 3  Sitting down on and standing up from a chair with arms (e.g., wheelchair, bedside commode, etc.): 1  Moving from lying on back to sitting on the side of the bed?: 3  Moving to and from a bed to a chair (including a wheelchair)?: 1  Need to walk in hospital room?: 1  Climbing 3-5 steps with a railing?: 1  Basic Mobility Total Score: 10     Functional Mobility:    Bed Mobility:   · Supine to Sit: stand by assistance and with HOB elevated; from Rt side of bed  · Scooting anteriorly to EOB to have both feet planted on floor: stand by assistance    Sitting Balance at Edge of Bed:   Assistance Level Required: Supervision   Time: ~25 minutes   Postural deviations noted: slouched posture, rounded shoulders and forward head    Transfers:   · Sit <> Stand Transfer: total assistance and of 2 persons with rolling walker   · Stand <> Sit Transfer: total assistance and of 2 persons with rolling walker   · [id number] from EOB   · Pt unable to complete full stand due to severe pain in bilateral feet, pt barely able to tolerate weightbearing  · Pt demo's functional strength and mechanics to perform stand but is pain limited at this  time    Education:   Time provided for education, counseling and discussion of health disposition in regards to patient's current status   All questions answered within PT scope of practice and to patient's satisfaction   PT role in POC to address current functional deficits   Pt educated on proper body mechanics, safety techniques, and energy conservation with PT facilitation and cueing throughout session   Whiteboard updated with pt's current mobility status documented above    Pt safe to sit EOB with intermittent supervision. Pt and RN notified.   Pt educated on current limitations and concerns for returning home. Pt stated understanding.   Pt educated on importance of informing care team about new types of pain in order to prevent further functional decline    Patient left seated EOB with all lines intact, call button in reach and RN notified.    GOALS:   Multidisciplinary Problems     Physical Therapy Goals        Problem: Physical Therapy Goal    Goal Priority Disciplines Outcome Goal Variances Interventions   Physical Therapy Goal     PT, PT/OT Ongoing, Progressing     Description: Goals to be met by: 7/3/2020     Patient will increase functional independence with mobility by performin. Sit to stand transfer with Supervision using LRAD  2. Bed to chair transfer with Supervision using LRAD  3. Gait  x 50 feet with Stand-by Assistance using LRAD.   4. Stand for 3 minutes with Supervision using LRAD while performing dynamic task with BUE to mimic functional tasks performed in the home  5. Lower extremity exercise program x20 reps per handout, with independence                     History:     Past Medical History:   Diagnosis Date    Anticoagulant long-term use     Aspirin    CHF (congestive heart failure)     Hyperlipidemia     Hypertension     NICM (nonischemic cardiomyopathy) 2020    Obesity        Past Surgical History:   Procedure Laterality Date    INSERTION OF BIVENTRICULAR  IMPLANTABLE CARDIOVERTER-DEFIBRILLATOR (ICD) N/A 2/13/2019    Procedure: INSERTION, ICD, BIVENTRICULAR;  Surgeon: Shailesh Eng MD;  Location: [facility] CATH LAB;  Service: Cardiology;  Laterality: N/A;  RN PRE OP 2-6-19  1ST CASE PER  RADHA. NOTIFIED RADHA THAT ANESTHESIA IS NOT PITO FOR 1ST CASE START-LO    oral extraction  11/2018    TESTICLE SURGERY         Time Tracking:     PT Received On: 06/19/20  PT Start Time: 1013     PT Stop Time: 1055  PT Total Time (min): 42 min     Billable Minutes: Evaluation 15 and Therapeutic Activity 27    Ana Palacios, PT, DPT  6/19/2020  Pager: [phone]

## 2020-06-19 NOTE — PROGRESS NOTES
"Hospital Medicine   Progress note      Team: [facility] HOSP MED G Nieves Medrano MD   Admit Date: 6/15/2020   Hospital Day: 4  MARIIA: 6/19/2020   Code status: Full Code   Principal Problem: Erosion of pacemaker pocket due to and not concurrent with implantation of cardiac pacemaker     Summary:  Papito Bhakta is a 65 y.o. male with a PMHx of combined HF (EF 20%, G3DD), AICD in place, HLD, HTN, , chronic LLE edema due to venous statsis, who presents to the hospital as a transfer from [facility] for evaluation of AICD/pacemaker protrusion through skin.  The patient was in his usual state of health and states he had been cutting grass earlier in the day when he noted a "sticking" sensation near his pacemaker.  He subsequently took his shirt off and noted the device protruding through his skin prompting him to present to the OSH for evaluation.   he did note some drainage out of that area for about 1 week.  The patient denies any pain, numbness, weakness, fever, CP, SOB, or device discharges. He was transferred to [facility] for higher level of care.  AICD device easily seen protruding through the skin of the patient's left chest wall.  No drainage or erythema.  The patient is admitted to Hospital Medicine, EP consulted. ICD interrogation ordered to ensure that patient is not pacemaker dependent.  Patient underwent device extraction on 06/17, and tolerated the procedure well.  Id was consulted.  Patient started on vancomycin, ceftriaxone immediately after the procedure.    Interval hx:   Pt was seen and examined at bedside.  Patient tolerated his device extraction procedure well.  Patient remained afebrile and hemodynamically stable overnight.  Remained on 2 L O2 via nasal cannula overnight, weaned down to 1 L this morning.  Continue to wean off oxygen further.  Patient attempted to work with OT/PT this morning however, he was unable to weightbear due to lower extremity pain which is likely secondary to " neuropathy.  Started on low-dose gabapentin 100 mg t.i.d. patient was seen by physical therapy again later in the afternoon, and performed much better.  OT/PT suggest discharge home with home health. Cultures from pacemaker pocket pending, infectious disease following.  Plan will be to discharge patient home with vancomycin/ceftriaxone for total 2 weeks from device extraction.  PICC line placement ordered, still pending.  Home health orders placed.  Will transition patient to [facility] medicine.  Likely discharge patient home with home health and home antibiotics tomorrow once PICC line is placed.    ROS (Positive in Bold, otherwise negative)  Constitutional: fever, chills, night sweats  CV: chest pain, edema, palpitations  Resp: SOB, cough, sputum production  GI: changes in appetite, NVDC, pain, melena, hematochezia, GERD, hematemesis  : Dysuria, hematuria, urinary urgency, frequency  MSK: arthralgia/myalgia, joint swelling  Neuro/Psych: anxiety, depression    PEx   Temp:  [97.4 °F (36.3 °C)-99.5 °F (37.5 °C)]   Pulse:  [52-73]   Resp:  [18-20]   BP: ()/(53-66)   SpO2:  [93 %-99 %]      I & O (Last 24H):     Intake/Output Summary (Last 24 hours) at 6/19/2020 1324  Last data filed at 6/19/2020 1200  Gross per 24 hour   Intake 2255 ml   Output 1970 ml   Net 285 ml       General:  male  in no acute distress. Nontoxic. Resting in bed. Cooperative.  HEENT: NCAT. PERRL. EOMI. Sclera Anicteric.  CVS: RRR. Normal S1 S2. No murmurs.  ICD device removed from left chest wall/Band-Aid over surgical site.  No erythema, drainage noted.  Pulm: CTAB. Normal respiratory effort. No wheezes, rhonchi, or crackles.  Abdomen: Soft. Non-distended. No tenderness to palpation. No rebound or guarding. +BS.  Extremities: +1 ble  edema. No cyanosis. Full ROM.  Neuro: Alert, oriented x 4, Spont mvt of all extremities with no focal deficits noted.    Recent Results (from the past 24 hour(s))   CBC auto differential     Collection Time: 06/19/20  4:54 AM   Result Value Ref Range    WBC 6.49 3.90 - 12.70 K/uL    RBC 4.17 (L) 4.60 - 6.20 M/uL    Hemoglobin 10.5 (L) 14.0 - 18.0 g/dL    Hematocrit 35.5 (L) 40.0 - 54.0 %    Mean Corpuscular Volume 85 82 - 98 fL    Mean Corpuscular Hemoglobin 25.2 (L) 27.0 - 31.0 pg    Mean Corpuscular Hemoglobin Conc 29.6 (L) 32.0 - 36.0 g/dL    RDW 13.6 11.5 - 14.5 %    Platelets 109 (L) 150 - 350 K/uL    MPV 13.0 (H) 9.2 - 12.9 fL    Immature Granulocytes 0.2 0.0 - 0.5 %    Gran # (ANC) 4.1 1.8 - 7.7 K/uL    Immature Grans (Abs) 0.01 0.00 - 0.04 K/uL    Lymph # 1.4 1.0 - 4.8 K/uL    Mono # 0.9 0.3 - 1.0 K/uL    Eos # 0.1 0.0 - 0.5 K/uL    Baso # 0.02 0.00 - 0.20 K/uL    nRBC 0 0 /100 WBC    Gran% 63.3 38.0 - 73.0 %    Lymph% 20.8 18.0 - 48.0 %    Mono% 14.2 4.0 - 15.0 %    Eosinophil% 1.2 0.0 - 8.0 %    Basophil% 0.3 0.0 - 1.9 %    Differential Method Automated    Comprehensive metabolic panel    Collection Time: 06/19/20  4:54 AM   Result Value Ref Range    Sodium 137 136 - 145 mmol/L    Potassium 3.8 3.5 - 5.1 mmol/L    Chloride 104 95 - 110 mmol/L    CO2 26 23 - 29 mmol/L    Glucose 133 (H) 70 - 110 mg/dL    BUN, Bld 16 8 - 23 mg/dL    Creatinine 1.1 0.5 - 1.4 mg/dL    Calcium 8.3 (L) 8.7 - 10.5 mg/dL    Total Protein 6.3 6.0 - 8.4 g/dL    Albumin 2.6 (L) 3.5 - 5.2 g/dL    Total Bilirubin 0.7 0.1 - 1.0 mg/dL    Alkaline Phosphatase 58 55 - 135 U/L    AST 12 10 - 40 U/L    ALT 7 (L) 10 - 44 U/L    Anion Gap 7 (L) 8 - 16 mmol/L    eGFR if African American >60.0 >60 mL/min/1.73 m^2    eGFR if non African American >60.0 >60 mL/min/1.73 m^2   Magnesium    Collection Time: 06/19/20  4:54 AM   Result Value Ref Range    Magnesium 1.8 1.6 - 2.6 mg/dL   Phosphorus    Collection Time: 06/19/20  4:54 AM   Result Value Ref Range    Phosphorus 3.1 2.7 - 4.5 mg/dL       Recent Labs   Lab 06/17/20  1647   POCTGLUCOSE 118*       No results found for: HGBA1C     Active Hospital Problems    Diagnosis  POA     *Erosion of pacemaker pocket due to and not concurrent with implantation of cardiac pacemaker [T82.897S]  Not Applicable    NICM (nonischemic cardiomyopathy) [I42.8]  Yes     Chronic    Infection of biventricular implantable cardioverter-defibrillator (ICD) [T82.7XXA]  Yes    Pacemaker complications, initial encounter [T82.9XXA]  Yes    Hyperlipidemia [E78.5]  Yes     Chronic    Essential hypertension [I10]  Yes     Chronic    Chronic combined systolic and diastolic congestive heart failure [I50.42]  Yes      Resolved Hospital Problems   No resolved problems to display.          Assessment and Plan for problems addressed today:      aspirin  325 mg Oral Daily    carvediloL  12.5 mg Oral BID    cefTRIAXone (ROCEPHIN) IVPB  2 g Intravenous Q24H    furosemide  80 mg Oral Daily    gabapentin  100 mg Oral TID    heparin (porcine)  5,000 Units Subcutaneous Q8H    lidocaine  1 patch Transdermal Q24H    lidocaine  1 patch Transdermal Q24H    magnesium sulfate IVPB  1 g Intravenous Once    pravastatin  80 mg Oral Daily    ramipriL  10 mg Oral Daily    spironolactone  25 mg Oral Daily    vancomycin (VANCOCIN) IVPB  2,000 mg Intravenous Q12H     acetaminophen, bupivacaine (PF) 0.25% (2.5 mg/ml), ondansetron, ondansetron, sodium chloride 0.9%, Pharmacy to dose Vancomycin consult **AND** vancomycin - pharmacy to dose    Erosion of pacemaker pocket due to and not concurrent with implantation of cardiac pacemaker  -patient with combined systolic and diastolic congestive heart failure, s/p ICD BIV placement in 02/2019  -Consulted Electrophysiology for AICD evaluation/stabilization  -patient underwent device extraction on 06/17/2020. Will need to wear life vest at discharge (already at bedside)  -infectious disease consulted, patient started on vancomycin and ceftriaxone  -Blood cultures from 06/15 with no growth to date  -continue to follow up intraoperative cultures from pocket and leads as obtained by EP  -plan  to discharge patient home with vancomycin/ceftriaxone for total 2 week course from device extraction.  End date 07/02/2020.  -PICC line consult placed  -Telemetry, Vitals per unit protocol  -monitor I/Os to r/o oliguria, end organ damage, maintain UOP .        Chronic combined systolic and diastolic congestive heart failure  -Most recent echo on (1/8/2019) reveals an EF of 10%, left ventricular diastolic dysfunction, severely reduced right ventricular systolic function  -Continue symptomatic management by diuresis with lasix 80 mg p.o. daily  -Cont asa 325mg, ramipril, pravastatin, Coreg 12.5 mg b.i.d.  -continue spironolactone given EF < 35%, NYHA class > II  -Continue to monitor on telemetry  -Monitor intake and output and obtain daily STANDING weights  -Fluid restriction to 1.5L per day and cardiac diet with salt restriction  -Supplemental O2 to keep Spo2 >90%, with PRN BiPAP for dyspnea/orthopnea  -Elevate head of bed    Essential hypertension  -Continue home ramipril, Coreg, spironolactone, Lasix  -monitor vitals q4h  -SBP goal of <160 in hospital      Hyperlipidemia  -Continue home pravastatin 80 mg daily    Osteoarthritis:  -lidocaine patch for pain control  -started on gabapentin 100 mg t.i.d.  -OT/PT consult     DVT PPx:  Heparin    Discharge plan and follow up: Plan will be to discharge patient home with vancomycin/ceftriaxone for total 2 weeks from device extraction.  PICC line placement ordered, still pending.  Home health orders placed.  Will transition patient to [facility] medicine.  Likely discharge patient home with home health and home antibiotics tomorrow once PICC line is placed and antibiotics set up. Life vest arranged and at bedside already.      Nieves Medrano MD  [facility] Medicine Staff  [phone] pager

## 2020-06-19 NOTE — CONSULTS
D/L PICC placed in R BASILIC vein, 45cm in length with 0cm exposed. Arm circumference 40cm. Lot#[id number]

## 2020-06-19 NOTE — PLAN OF CARE
[facility]    HOME HEALTH ORDERS  FACE TO FACE ENCOUNTER    Patient Name: Papito Bhakta  YOB: 1955    PCP: Brynn To MD   PCP Address: [address] [place] [address] / [place] [address]  PCP Phone Number: [phone]  PCP Fax: [phone]    Encounter Date: 06/19/2020    Admit to Home Health    Diagnoses:  Active Hospital Problems    Diagnosis  POA    *Erosion of pacemaker pocket due to and not concurrent with implantation of cardiac pacemaker [T82.897S]  Not Applicable    NICM (nonischemic cardiomyopathy) [I42.8]  Yes     Chronic    Infection of biventricular implantable cardioverter-defibrillator (ICD) [T82.7XXA]  Yes    Infection of pacemaker pocket [T82.7XXA]  Yes    Hyperlipidemia [E78.5]  Yes     Chronic    Essential hypertension [I10]  Yes     Chronic    Chronic combined systolic and diastolic congestive heart failure [I50.42]  Yes      Resolved Hospital Problems   No resolved problems to display.       Future Appointments   Date Time Provider Department Center   7/7/2020 10:00 AM Brynn To MD [facility]   7/8/2020  8:40 AM Makayla Long MD [facility] CARDIO [facility]     Follow-up Information     Jim Kwong MD.    Specialties: Electrophysiology, Cardiology  Contact information:  [address]  [facility] [id number] [phone]             Brynn To MD.    Specialty: Internal Medicine  Contact information:  [address] [address]  [phone]             [facility] INFECTIOUS DISEASE. Schedule an appointment as soon as possible for a visit in 2 weeks.    Specialty: Infectious Diseases  Contact information:  [address]  [facility] [address]  [phone]           [facility].    Specialty: Emergency Medicine  Why: As needed, If symptoms worsen  Contact information:  [address]  [facility] [id number] [phone]                   I have seen and examined this patient face to face  today. My clinical findings that support the need for the home health skilled services and home bound status are the following:  Weakness/numbness causing balance and gait disturbance due to Infection and Weakness/Debility making it taxing to leave home.    Allergies:Review of patient's allergies indicates:  No Known Allergies    Diet: cardiac diet and diabetic diet: 2000 calorie    Activities: activity as tolerated    Nursing:   SN to complete comprehensive assessment including routine vital signs. Instruct on disease process and s/s of complications to report to MD. Review/verify medication list sent home with the patient at time of discharge  and instruct patient/caregiver as needed. Frequency may be adjusted depending on start of care date.    Notify MD if SBP > 160 or < 90; DBP > 90 or < 50; HR > 120 or < 50; Temp > 101;       CONSULTS:    Physical Therapy to evaluate and treat. Evaluate for home safety and equipment needs; Establish/upgrade home exercise program. Perform / instruct on therapeutic exercises, gait training, transfer training, and Range of Motion.  Occupational Therapy to evaluate and treat. Evaluate home environment for safety and equipment needs. Perform/Instruct on transfers, ADL training, ROM, and therapeutic exercises.  Aide to provide assistance with personal care, ADLs, and vital signs.    MISCELLANEOUS CARE:  Home Infusion Therapy:   SN to perform Infusion Therapy/Central Line Care.  Review Central Line Care & Central Line Flush with patient.    1. Vancomycin 2g IV q12h  2. Ceftriaxone 2g IV q24h  - plan for 2 weeks from extraction 6/17     End date of IV antibiotics: 7/2/20     Weekly outpatient laboratory on Monday or Tuesday while on IV antibiotics.   · CBC  · CMP or BMP  · ESR and CRP  · Vancomycin trough. Target 15-20    Last dose given: 6/19/2020                       Home dose due: 6/20/2020    Scrub the Hub: Prior to accessing the line, always perform a 30 second alcohol  scrub  Each lumen of the central line is to be flushed at least daily with 10 mL Normal Saline and 3 mL Heparin flush (10 units/mL)  Skilled Nurse (SN) may draw blood from IV access  Blood Draw Procedure:   - Aspirate at least 5 mL of blood   - Discard   - Obtain specimen   - Change injection cap   - Flush with 20 mL Normal Saline followed by a                 3-5 mL Heparin flush (10 units/mL)  Central :   - Sterile dressing changes are done weekly and as needed.   - Use chlor-hexadine scrub to cleanse site, apply Biopatch to insertion site,       apply securement device dressing   - Injection caps are changed weekly and after EVERY lab draw.   - If sterile gauze is under dressing to control oozing,                 dressing change must be performed every 24 hours until gauze is not needed.    Medications: Review discharge medications with patient and family and provide education.      Current Discharge Medication List      START taking these medications    Details   cefTRIAXone (ROCEPHIN) 2 g/50 mL PgBk IVPB Inject 50 mLs (2 g total) into the vein once daily. for 13 days  Qty: 13 each, Refills: 0      gabapentin (NEURONTIN) 100 MG capsule Take 1 capsule (100 mg total) by mouth 3 (three) times daily.  Qty: 90 capsule, Refills: 1      lidocaine (LIDODERM) 5 % Place 1 patch onto the skin once daily. Remove & Discard patch within 12 hours or as directed by MD  Qty: 30 patch, Refills: 0      vancomycin HCl in 5 % dextrose (VANCOMYCIN IN DEXTROSE 5 %) 2 gram/500 mL Soln Inject 500 mLs (2,000 mg total) into the vein every 12 (twelve) hours. for 12 days         CONTINUE these medications which have CHANGED    Details   carvediloL (COREG) 12.5 MG tablet Take 1 tablet (12.5 mg total) by mouth 2 (two) times daily.  Qty: 60 tablet, Refills: 11    Comments: .         CONTINUE these medications which have NOT CHANGED    Details   aspirin 325 MG tablet Take 325 mg by mouth once daily.      pravastatin (PRAVACHOL)  80 MG tablet Take 1 tablet (80 mg total) by mouth once daily.  Qty: 90 tablet, Refills: 3    Associated Diagnoses: Other hyperlipidemia      ramipril (ALTACE) 10 MG capsule Take 1 capsule (10 mg total) by mouth once daily.  Qty: 90 capsule, Refills: 3      spironolactone (ALDACTONE) 25 MG tablet Take 1 tablet (25 mg total) by mouth once daily.  Qty: 90 tablet, Refills: 3      diclofenac sodium (VOLTAREN) 1 % Gel Apply 2 g topically 4 (four) times daily.  Qty: 100 g, Refills: 1    Associated Diagnoses: Injury of left rotator cuff, initial encounter      furosemide (LASIX) 80 MG tablet Take 1 tablet (80 mg total) by mouth once daily.  Qty: 90 tablet, Refills: 3    Associated Diagnoses: Chronic combined systolic and diastolic congestive heart failure             I certify that this patient is confined to his home and needs intermittent skilled nursing care, physical therapy and occupational therapy.    Nieves Medrano MD  6/19/2020 3:38 PM  Department of Hospital medicine

## 2020-06-19 NOTE — ASSESSMENT & PLAN NOTE
- ICD infection given erosion through skin  - no surrounding or systemic signs of infection  - ICD and leads extracted - Lead CXs NGTD  - No pocket cultures done but per EP necrotic tissue seen no true purulence  - on vanc and CTX  - Intra Op JASSON - no mention of vegetation    Plan:  - continue Vanc and Ceftriaxone  - fu cultures   - if no growth then can replace on opposite side in 3 days  - sign off - see final plan below    Discharge antibiotics:   1. Vancomycin 2g IV q12h  2. Ceftriaxone 2g IV q24h  - plan for 2 weeks from extraction 6/17    End date of IV antibiotics: 7/2/20    Weekly outpatient laboratory on Monday or Tuesday while on IV antibiotics.    CBC   CMP or BMP   ESR and CRP   Vancomycin trough. Target 15-20      If vancomycin trough is not at target (15-20) prior to discharge, the please perform vancomycin trough before their fourth outpatient dose.    Fax laboratory results to [facility] at [phone] with attn: Meena Rutledge NP    Outpatient Infectious Diseases clinic follow up will be arranged and found in patient calendar.    Prior to discharge, please ensure the patient's follow-up has been scheduled.  If there is still no follow-up scheduled in Infectious Diseases clinic, please send an EPIC message to Bonita Keita LPN in Infectious Diseases.

## 2020-06-19 NOTE — PLAN OF CARE
Discharge Recommendation: HHPT - pending medical pain mgmt    Evaluation completed today. PT goals appropriate.    Patient is safe to perform EOB activity with supervision with nursing staff.    Ana Palacios, PT, DPT  2020  Pager: [phone]        Problem: Physical Therapy Goal  Goal: Physical Therapy Goal  Description: Goals to be met by: 7/3/2020     Patient will increase functional independence with mobility by performin. Sit to stand transfer with Supervision using LRAD  2. Bed to chair transfer with Supervision using LRAD  3. Gait  x 50 feet with Stand-by Assistance using LRAD.   4. Stand for 3 minutes with Supervision using LRAD while performing dynamic task with BUE to mimic functional tasks performed in the home  5. Lower extremity exercise program x20 reps per handout, with independence    Outcome: Ongoing, Progressing

## 2020-06-19 NOTE — PROGRESS NOTES
[facility]  Infectious Disease  Progress Note    Patient Name: Papito Bhakta  MRN: [id number]  Admission Date: 6/15/2020  Length of Stay: 4 days  Attending Physician: Nieves Medrano MD  Primary Care Provider: Brynn To MD    Isolation Status: No active isolations  Assessment/Plan:      * Erosion of pacemaker pocket due to and not concurrent with implantation of cardiac pacemaker  - ICD infection given erosion through skin  - no surrounding or systemic signs of infection  - ICD and leads extracted - Lead CXs NGTD  - No pocket cultures done but per EP necrotic tissue seen no true purulence  - given blood cultures negative, lead tips NGTD, JASSON without mention of vegetations suspect pocket infection only  - on vanc and CTX  - Intra Op JASSON - no mention of vegetation    Plan:  - continue Vanc and Ceftriaxone  - fu cultures   - if no growth then can replace on opposite side in 3 days  - sign off - see final plan below  - FU 7/2 in ID CLinic    Discharge antibiotics:   1. Vancomycin 2g IV q12h  2. Ceftriaxone 2g IV q24h  - plan for 2 weeks from extraction 6/17    End date of IV antibiotics: 7/2/20    Weekly outpatient laboratory on Monday or Tuesday while on IV antibiotics.    CBC   CMP or BMP   ESR and CRP   Vancomycin trough. Target 15-20      If vancomycin trough is not at target (15-20) prior to discharge, the please perform vancomycin trough before their fourth outpatient dose.    Fax laboratory results to [facility] at [phone] with attn: Meena Rutledge NP    Outpatient Infectious Diseases clinic follow up will be arranged and found in patient calendar.    Prior to discharge, please ensure the patient's follow-up has been scheduled.  If there is still no follow-up scheduled in Infectious Diseases clinic, please send an EPIC message to Bonita Keita LPN in Infectious Diseases.                  Anticipated Disposition: tbd    Thank you for your consult. I will sign off. Please contact  "us if you have any additional questions.    YASH Marie  Infectious Disease  [facility]    Subjective:     Principal Problem:Erosion of pacemaker pocket due to and not concurrent with implantation of cardiac pacemaker    HPI: Papito Bhakta is a 65 y.o. male with a significant medical history of CHF, HLD, HTN, s/p AICD placement who presents to the hospital as a transfer from [facility] for evaluation of AICD/pacemaker protrusion through skin.  The patient was in his usual state of health and states he had been cutting grass earlier in the day when he noted a "sticking" sensation near his pacemaker.  He subsequently took his shirt off and noted the device protruding through his skin prompting him to present to the OSH for evaluation.  The patient denies any pain, numbness, weakness, fever, CP, SOB, or device discharges.  He did not report anything that exacerbated or alleviated his symptom.  He was transferred to [facility] for higher level of care.  AICD device was easily seen protruding through the skin of the patient's left chest wall.  No drainage or erythema noted.  The patient was admitted to Hospital Medicine.    Interval History:  ID consulted for ABX recs.  Patient has been afebrile and WBC WNL.  CRP WNL and procalcitonin 0.04. Blood cultures NGTD.  The patient denies any recent fever, chills, or sweats.  He is to undergo removal 6/17.  He is not currently on abx.         Interval History: No AEON. Afebrile and WBC WNL.   Blood cultures NGTD and lead cultures NGTD.  Denies fever, chills or sweats.      Review of Systems   Constitutional: Negative for chills, diaphoresis and fever.   Respiratory: Negative for shortness of breath.    Cardiovascular: Negative for chest pain.   Gastrointestinal: Negative for abdominal pain, diarrhea, nausea and vomiting.   Genitourinary: Negative for dysuria and hematuria.   Skin: Positive for wound.     Objective:     Vital Signs " (Most Recent):  Temp: 97.8 °F (36.6 °C) (06/19/20 1133)  Pulse: (!) 53 (06/19/20 1509)  Resp: 20 (06/19/20 1133)  BP: (!) 89/53 (06/19/20 1133)  SpO2: (!) 93 % (06/19/20 1133) Vital Signs (24h Range):  Temp:  [97.4 °F (36.3 °C)-99.5 °F (37.5 °C)] 97.8 °F (36.6 °C)  Pulse:  [52-73] 53  Resp:  [18-20] 20  SpO2:  [93 %-99 %] 93 %  BP: ()/(53-66) 89/53     Weight: (!) 145.6 kg (321 lb)  Body mass index is 38.07 kg/m².    Estimated Creatinine Clearance: 105.8 mL/min (based on SCr of 1.1 mg/dL).    Physical Exam  Constitutional:       General: He is not in acute distress.     Appearance: He is well-developed. He is not diaphoretic.       HENT:      Head: Normocephalic and atraumatic.   Cardiovascular:      Rate and Rhythm: Normal rate and regular rhythm.      Heart sounds: Normal heart sounds. No murmur. No friction rub. No gallop.    Pulmonary:      Effort: Pulmonary effort is normal. No respiratory distress.      Breath sounds: Normal breath sounds. No wheezing or rales.   Abdominal:      General: Bowel sounds are normal. There is no distension.      Palpations: Abdomen is soft. There is no mass.      Tenderness: There is no abdominal tenderness. There is no guarding or rebound.   Skin:     General: Skin is warm and dry.   Neurological:      Mental Status: He is alert and oriented to person, place, and time.   Psychiatric:         Behavior: Behavior normal.         Significant Labs:   Blood Culture:   Recent Labs   Lab 06/16/20  0031   LABBLOO No Growth to date  No Growth to date  No Growth to date  No Growth to date  No Growth to date  No Growth to date  No Growth to date  No Growth to date     CBC:   Recent Labs   Lab 06/18/20  0420 06/19/20  0454   WBC 6.86 6.49   HGB 11.8* 10.5*   HCT 39.0* 35.5*   * 109*     CMP:   Recent Labs   Lab 06/18/20  0420 06/19/20  0454    137   K 4.7 3.8    104   CO2 28 26   GLU 88 133*   BUN 16 16   CREATININE 1.3 1.1   CALCIUM 8.7 8.3*   PROT 7.1 6.3    ALBUMIN 3.0* 2.6*   BILITOT 1.0 0.7   ALKPHOS 63 58   AST 13 12   ALT 6* 7*   ANIONGAP 7* 7*   EGFRNONAA 57.3* >60.0     Wound Culture:   Recent Labs   Lab 06/17/20  1305 06/17/20  1310 06/17/20  1320   LABAERO No growth No growth No growth     All pertinent labs within the past 24 hours have been reviewed.    Significant Imaging: I have reviewed all pertinent imaging results/findings within the past 24 hours.

## 2020-06-19 NOTE — PT/OT/SLP EVAL
Occupational Therapy   Evaluation    Name: Papito Bhakta  MRN: [id number]  Admitting Diagnosis:  Erosion of pacemaker pocket due to and not concurrent with implantation of cardiac pacemaker 2 Days Post-Op    Recommendations:     Discharge Recommendations:    Discharge Equipment Recommendations:     Barriers to discharge:  None    Assessment:     Papito Bhakta is a 65 y.o. 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 Performance deficits affecting function: impaired balance, impaired cardiopulmonary response to activity, impaired coordination, impaired endurance, impaired functional mobilty, impaired self care skills, impaired skin, decreased coordination, decreased lower extremity function, decreased ROM.      Rehab Prognosis: Fair; patient would benefit from acute skilled OT services to address these deficits and reach maximum level of function.       Plan:     Patient to be seen 4 x/week to address the above listed problems via self-care/home management, therapeutic activities, therapeutic exercises  · Plan of Care Expires: 06/17/20  · Plan of Care Reviewed with: patient    Subjective     Chief Complaint: BLE ankle and knee pain  Patient/Family Comments/goals: Pain management.     Occupational Profile:  Living Environment:Patient lives with daughter and victor hugo (6 y.o.) in a single family home with 0 SHIRA with tub/shower  Previous level of function: Independent w/ ADLs/IADLs and occasionally needs a walker for mobility.   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   Equipment Used at Home:  walker, rolling, commode(commode over toilet)  Assistance upon Discharge: Yes from  family.    Pain/Comfort:  · Pain Rating 1: (severe BLE pain)  · Location - Side 1: Bilateral  · Location - Orientation 1: lower  · Location 1: leg(leg)  · Pain Addressed 1: Pre-medicate for activity, Distraction, Cessation of Activity, Reposition, Nurse notified, Other (see comments)(discussed pain management w/ MD via secure chat.)    Patients cultural, spiritual, Faith conflicts given the current situation: no    Objective:     Communicated with: RN prior to session.  Patient found HOB elevated with telemetry upon OT entry to room.    General Precautions: Standard, fall   Orthopedic Precautions:N/A   Braces: N/A     Occupational Performance:    Bed Mobility:    · Patient completed Rolling/Turning to Right with stand by assistance  · Patient completed Scooting/Bridging with stand by assistance  · Patient completed Supine to Sit with stand by assistance    Functional Mobility/Transfers:  · Patient completed Sit <> Stand Transfer with dependence  with  hand-held assist  unable to complete 2* pain.   · Functional Mobility: Attempted one stand unsuccessfully, but Pt moved around bed w/ stand bye assistance.      Activities of Daily Living:  · Feeding:  supervision seated at EOB.   · Grooming: supervision seated at EOB   · Bathing: supervision seated at EOB  · Lower Body Dressing: maximal assistance seated at EOB w/ leg kickout.     Cognitive/Visual Perceptual:  Completely cognitively intact adult w/ no glasses worn or present during eval.       Physical Exam:  Balance:    -       seated: Fair +, standing: NT  Skin integrity: Dry and cracking skin present on lower legs.   Edema:  Moderate on lower right leg and left ankle  Dominant hand:    -       Left  Upper Extremity Range of Motion:     -       Right Upper Extremity: WFL  -       Left Upper Extremity: WFL  Upper Extremity Strength:    -       Right Upper Extremity: WFL  -       Left Upper Extremity: WFL   Strength:    -       Right Upper Extremity: WFL  -        Left Upper Extremity: WFL  Fine Motor Coordination:    -       Intact  Gross motor coordination:   WFL    AMPAC 6 Click ADL:  AMPAC Total Score: 12    Treatment & Education:  -Pt edu on OT role/POC  -Importance of OOB activity with staff assistance  -Safety during functional t/f and mobility  - White board updated  - Multiple self care tasks/functional mobility completed-- assistance level noted above  - All questions/concerns answered within OT scope of practice    Spent 10 mins w/ team discussing the plan for addressing BLE pain to allow us to save function.   Education:    Patient left seated at EOB  with all lines intact, call button in reach and RN notified    GOALS:   Multidisciplinary Problems     Occupational Therapy Goals        Problem: Occupational Therapy Goal    Goal Priority Disciplines Outcome Interventions   Occupational Therapy Goal     OT, PT/OT Ongoing, Progressing    Description: Goals to be met by: 7/17/2020     Patient will increase functional independence with ADLs by performing:    UE Dressing with Modified [place] and Assistive Devices as needed.  LE Dressing with Supervision and Assistive Devices as needed.  Grooming while standing at sink with Supervision and Assistive Devices as needed.  Toileting from bedside commode with Supervision for hygiene and clothing management.   Bathing from  standing at sink with Supervision and Assistive Devices as needed.  Toilet transfer to bedside commode with Supervision and Assistive Devices as needed.                     History:     Past Medical History:   Diagnosis Date    Anticoagulant long-term use     Aspirin    CHF (congestive heart failure)     Hyperlipidemia     Hypertension     NICM (nonischemic cardiomyopathy) 6/16/2020    Obesity        Past Surgical History:   Procedure Laterality Date    INSERTION OF BIVENTRICULAR IMPLANTABLE CARDIOVERTER-DEFIBRILLATOR (ICD) N/A 2/13/2019    Procedure: INSERTION, ICD, BIVENTRICULAR;   Surgeon: Shailesh Eng MD;  Location: [facility] CATH LAB;  Service: Cardiology;  Laterality: N/A;  RN PRE OP 2-6-19  1ST CASE PER  RADHA. NOTIFIED RADHA THAT ANESTHESIA IS NOT PITO FOR 1ST CASE START-LO    oral extraction  11/2018    TESTICLE SURGERY         Time Tracking:     OT Date of Treatment: 06/19/20  OT Start Time: 1014  OT Stop Time: 1059  OT Total Time (min): 45 min    Billable Minutes:Evaluation 7 mins  Self Care/Home Management 38 mins    Tez Rolon, OT  6/19/2020

## 2020-06-19 NOTE — PLAN OF CARE
06/19/20 0852   Discharge Reassessment   Assessment Type Discharge Planning Reassessment   Do you have any problems affording any of your prescribed medications? TBD   Discharge Plan A Home with family;Home Health;Other  (IV Abx?)   DME Needed Upon Discharge  other (see comments)  ([employer])

## 2020-06-19 NOTE — PLAN OF CARE
Pt remained free from falls/trauma/injuries. No complaints of CP/SOB/discomfort. Extraction site CDI. Will d/c home with a life vest. Remains on abx therapy, Vanc and Rocephin. Remains afebrile. VSS. Fall bundle in place. POC explained, no questions at this time. Pt tolerating care.

## 2020-06-19 NOTE — PLAN OF CARE
Plan of care updated with patient. No falls during shift. No skin breakdown. Maintained fall protocol. Mcmahon to be removed on night shift. PRN insulin increased. BG monitoring continued. Pt did require insulin for both lunch and dinner. Pt receiving IV Vancomycin QD.  Addressed all issues throughout shift.

## 2020-06-19 NOTE — MEDICAL/APP STUDENT
I spoke with Annie to update that Papito is doing well today and waiting on cultures and PICC placement tomorrow pending. Also updated on Papito's foot pain, starting gabapentin, and that PT will check on him again today. Patient had questions about D/C plan and location.

## 2020-06-19 NOTE — PLAN OF CARE
Plan of care updated with patient. No falls during shift. No skin breakdown. Maintained fall protocol. Mcmahon to be removed on night shift. Pt receiving IV Vancomycin QD. Pt is 2 person assist and has poor trunk control. Addressed all issues throughout shift.

## 2020-06-19 NOTE — PLAN OF CARE
Patient is s/p extraction of infected ICD : POD 2  Tolerated procedure well. No acute complication noted.  Sinus rhythm in the 50s. HD stable   Antibiotics per ID (minimum of 5 days for post procedure prophylaxis)  NO HEPARIN PRODUCTS  Aquacel dressing to be removed by device clinic nurse in 1 week  Groin site - b/l - hemostasis with manual pressure .  Life vest fitted , he will return to the EP clinic for discussion of reimplantation in future .     Other instruction:   ==============================  No lifting over 5 pounds from left arm for 1 week.   Do not let beam of shower/water hit site directly and no scrubbing in area  Follow up in device clinic in 1 week and with Ep clinic  in 3 months.  Notify Cardiology/EP increased redness, warmth, drainage, or re-opening of the wound   Please call [phone] option 2 during business hours or the main [facility] number and ask for on-call for device clinic after hours.

## 2020-06-19 NOTE — PROGRESS NOTES
Mcmahon catheter removed. Educated patient to notify nurse after first urination post removal within 6 hours.

## 2020-06-19 NOTE — PROCEDURES
"Papito Bhakta is a 65 y.o. male patient.    Temp: 98.4 °F (36.9 °C) (06/19/20 1550)  Pulse: (!) 54 (06/19/20 1600)  Resp: (!) 22 (06/19/20 1550)  BP: 105/68 (06/19/20 1550)  SpO2: 97 % (06/19/20 1600)  Weight: (!) 145.6 kg (321 lb) (06/17/20 1118)  Height: 6' 5" (195.6 cm) (06/17/20 1118)    PICC  Date/Time: 6/19/2020 5:00 PM  Performed by: Mara Mcdonald RN  Assisting provider: Mari Augustine LPN  Consent Done: Yes  Time out: Immediately prior to procedure a time out was called to verify the correct patient, procedure, equipment, support staff and site/side marked as required  Indications: med administration and vascular access  Anesthesia: local infiltration  Local anesthetic: lidocaine 1% without epinephrine  Anesthetic Total (mL): 2  Preparation: skin prepped with ChloraPrep  Skin prep agent dried: skin prep agent completely dried prior to procedure  Sterile barriers: all five maximum sterile barriers used - cap, mask, sterile gown, sterile gloves, and large sterile sheet  Hand hygiene: hand hygiene performed prior to central venous catheter insertion  Location details: right basilic  Catheter type: double lumen  Catheter size: 5 Fr  Catheter Length: 45cm    Ultrasound guidance: yes  Vessel Caliber: medium and patent, compressibility normal  Vascular Doppler: not done  Needle advanced into vessel with real time Ultrasound guidance.  Guidewire confirmed in vessel.  Image recorded and saved.  Sterile sheath used.  Number of attempts: 1  Post-procedure: blood return through all ports, chlorhexidine patch and sterile dressing applied  Technical procedures used: 3CG  Specimens: No  Implants: No  Assessment: placement verified by x-ray  Complications: none          Mari Augustine  6/19/2020  "

## 2020-06-20 VITALS
HEART RATE: 56 BPM | RESPIRATION RATE: 15 BRPM | TEMPERATURE: 99 F | SYSTOLIC BLOOD PRESSURE: 97 MMHG | OXYGEN SATURATION: 93 % | HEIGHT: 77 IN | WEIGHT: 315 LBS | DIASTOLIC BLOOD PRESSURE: 59 MMHG | BODY MASS INDEX: 37.19 KG/M2

## 2020-06-20 LAB
ALBUMIN SERPL BCP-MCNC: 2.4 G/DL (ref 3.5–5.2)
ALP SERPL-CCNC: 57 U/L (ref 55–135)
ALT SERPL W/O P-5'-P-CCNC: 6 U/L (ref 10–44)
ANION GAP SERPL CALC-SCNC: 8 MMOL/L (ref 8–16)
AST SERPL-CCNC: 12 U/L (ref 10–40)
BACTERIA SPEC AEROBE CULT: NO GROWTH
BASOPHILS # BLD AUTO: 0.03 K/UL (ref 0–0.2)
BASOPHILS NFR BLD: 0.5 % (ref 0–1.9)
BILIRUB SERPL-MCNC: 0.5 MG/DL (ref 0.1–1)
BUN SERPL-MCNC: 18 MG/DL (ref 8–23)
CALCIUM SERPL-MCNC: 8.6 MG/DL (ref 8.7–10.5)
CHLORIDE SERPL-SCNC: 103 MMOL/L (ref 95–110)
CO2 SERPL-SCNC: 25 MMOL/L (ref 23–29)
CREAT SERPL-MCNC: 1 MG/DL (ref 0.5–1.4)
DIFFERENTIAL METHOD: ABNORMAL
EOSINOPHIL # BLD AUTO: 0.2 K/UL (ref 0–0.5)
EOSINOPHIL NFR BLD: 3.6 % (ref 0–8)
ERYTHROCYTE [DISTWIDTH] IN BLOOD BY AUTOMATED COUNT: 13.6 % (ref 11.5–14.5)
EST. GFR  (AFRICAN AMERICAN): >60 ML/MIN/1.73 M^2
EST. GFR  (NON AFRICAN AMERICAN): >60 ML/MIN/1.73 M^2
GLUCOSE SERPL-MCNC: 127 MG/DL (ref 70–110)
HCT VFR BLD AUTO: 35.7 % (ref 40–54)
HGB BLD-MCNC: 10.6 G/DL (ref 14–18)
IMM GRANULOCYTES # BLD AUTO: 0.01 K/UL (ref 0–0.04)
IMM GRANULOCYTES NFR BLD AUTO: 0.2 % (ref 0–0.5)
LYMPHOCYTES # BLD AUTO: 1.4 K/UL (ref 1–4.8)
LYMPHOCYTES NFR BLD: 23.4 % (ref 18–48)
MAGNESIUM SERPL-MCNC: 2 MG/DL (ref 1.6–2.6)
MCH RBC QN AUTO: 25.2 PG (ref 27–31)
MCHC RBC AUTO-ENTMCNC: 29.7 G/DL (ref 32–36)
MCV RBC AUTO: 85 FL (ref 82–98)
MONOCYTES # BLD AUTO: 0.9 K/UL (ref 0.3–1)
MONOCYTES NFR BLD: 14.5 % (ref 4–15)
NEUTROPHILS # BLD AUTO: 3.5 K/UL (ref 1.8–7.7)
NEUTROPHILS NFR BLD: 57.8 % (ref 38–73)
NRBC BLD-RTO: 0 /100 WBC
PHOSPHATE SERPL-MCNC: 3.5 MG/DL (ref 2.7–4.5)
PLATELET # BLD AUTO: 122 K/UL (ref 150–350)
PMV BLD AUTO: 13.1 FL (ref 9.2–12.9)
POTASSIUM SERPL-SCNC: 4.1 MMOL/L (ref 3.5–5.1)
PROT SERPL-MCNC: 6.3 G/DL (ref 6–8.4)
RBC # BLD AUTO: 4.21 M/UL (ref 4.6–6.2)
SODIUM SERPL-SCNC: 136 MMOL/L (ref 136–145)
WBC # BLD AUTO: 6.08 K/UL (ref 3.9–12.7)

## 2020-06-20 PROCEDURE — 25000003 PHARM REV CODE 250: Mod: HCNC | Performed by: HOSPITALIST

## 2020-06-20 PROCEDURE — 63600175 PHARM REV CODE 636 W HCPCS: Mod: HCNC | Performed by: HOSPITALIST

## 2020-06-20 PROCEDURE — 85025 COMPLETE CBC W/AUTO DIFF WBC: CPT | Mod: HCNC

## 2020-06-20 PROCEDURE — 25000003 PHARM REV CODE 250: Mod: HCNC | Performed by: NURSE PRACTITIONER

## 2020-06-20 PROCEDURE — 97535 SELF CARE MNGMENT TRAINING: CPT | Mod: HCNC

## 2020-06-20 PROCEDURE — 94664 DEMO&/EVAL PT USE INHALER: CPT | Mod: HCNC

## 2020-06-20 PROCEDURE — 97530 THERAPEUTIC ACTIVITIES: CPT | Mod: HCNC

## 2020-06-20 PROCEDURE — 94799 UNLISTED PULMONARY SVC/PX: CPT | Mod: HCNC

## 2020-06-20 PROCEDURE — 94761 N-INVAS EAR/PLS OXIMETRY MLT: CPT | Mod: HCNC

## 2020-06-20 PROCEDURE — 99239 PR HOSPITAL DISCHARGE DAY,>30 MIN: ICD-10-PCS | Mod: HCNC,,, | Performed by: HOSPITALIST

## 2020-06-20 PROCEDURE — 83735 ASSAY OF MAGNESIUM: CPT | Mod: HCNC

## 2020-06-20 PROCEDURE — 80053 COMPREHEN METABOLIC PANEL: CPT | Mod: HCNC

## 2020-06-20 PROCEDURE — 84100 ASSAY OF PHOSPHORUS: CPT | Mod: HCNC

## 2020-06-20 PROCEDURE — 27000221 HC OXYGEN, UP TO 24 HOURS: Mod: HCNC

## 2020-06-20 PROCEDURE — 99239 HOSP IP/OBS DSCHRG MGMT >30: CPT | Mod: HCNC,,, | Performed by: HOSPITALIST

## 2020-06-20 PROCEDURE — 25000003 PHARM REV CODE 250: Mod: HCNC | Performed by: STUDENT IN AN ORGANIZED HEALTH CARE EDUCATION/TRAINING PROGRAM

## 2020-06-20 PROCEDURE — A4216 STERILE WATER/SALINE, 10 ML: HCPCS | Mod: HCNC | Performed by: HOSPITALIST

## 2020-06-20 PROCEDURE — 36415 COLL VENOUS BLD VENIPUNCTURE: CPT | Mod: HCNC

## 2020-06-20 PROCEDURE — 99900035 HC TECH TIME PER 15 MIN (STAT): Mod: HCNC

## 2020-06-20 RX ADMIN — LIDOCAINE 1 PATCH: 50 PATCH TOPICAL at 02:06

## 2020-06-20 RX ADMIN — FUROSEMIDE 80 MG: 80 TABLET ORAL at 09:06

## 2020-06-20 RX ADMIN — DEXTROSE MONOHYDRATE 2000 MG: 50 INJECTION, SOLUTION INTRAVENOUS at 02:06

## 2020-06-20 RX ADMIN — SPIRONOLACTONE 25 MG: 25 TABLET ORAL at 09:06

## 2020-06-20 RX ADMIN — PRAVASTATIN SODIUM 80 MG: 40 TABLET ORAL at 09:06

## 2020-06-20 RX ADMIN — GABAPENTIN 100 MG: 100 CAPSULE ORAL at 09:06

## 2020-06-20 RX ADMIN — ASPIRIN 325 MG ORAL TABLET 325 MG: 325 PILL ORAL at 09:06

## 2020-06-20 RX ADMIN — GABAPENTIN 100 MG: 100 CAPSULE ORAL at 02:06

## 2020-06-20 RX ADMIN — RAMIPRIL 10 MG: 2.5 CAPSULE ORAL at 09:06

## 2020-06-20 RX ADMIN — HEPARIN SODIUM 5000 UNITS: 5000 INJECTION INTRAVENOUS; SUBCUTANEOUS at 02:06

## 2020-06-20 RX ADMIN — HEPARIN SODIUM 5000 UNITS: 5000 INJECTION INTRAVENOUS; SUBCUTANEOUS at 05:06

## 2020-06-20 RX ADMIN — CARVEDILOL 12.5 MG: 25 TABLET, FILM COATED ORAL at 09:06

## 2020-06-20 NOTE — PLAN OF CARE
Pt goals remain appropriate, continue w/ OT POC.    Problem: Occupational Therapy Goal  Goal: Occupational Therapy Goal  Description: Goals to be met by: 7/17/2020     Patient will increase functional independence with ADLs by performing:    UE Dressing with Modified [place] and Assistive Devices as needed.  LE Dressing with Supervision and Assistive Devices as needed.  Grooming while standing at sink with Supervision and Assistive Devices as needed.  Toileting from bedside commode with Supervision for hygiene and clothing management.   Bathing from  standing at sink with Supervision and Assistive Devices as needed.  Toilet transfer to bedside commode with Supervision and Assistive Devices as needed.    6/20/2020 1613 by Tez Rolon, OT  Outcome: Ongoing, Progressing   Tez Rolon LOTR  6/20/2020

## 2020-06-20 NOTE — PT/OT/SLP PROGRESS
Occupational Therapy   Treatment    Name: Papito Bhakta  MRN: [id number]  Admitting Diagnosis:  Erosion of pacemaker pocket due to and not concurrent with implantation of cardiac pacemaker  3 Days Post-Op    Recommendations:     Discharge Recommendations: home health PT  Discharge Equipment Recommendations:  wheelchair  Barriers to discharge:  None    Assessment:     Papito Bhakta is a 65 y.o. 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  He presents with improving mobility and ability to transfer.  Pt still reports pain in both ankles, but the knee pain is no longer an issue.  He tolerated standing from a w/c and walking two steps forward and back w/ minimal support for the stand and moderate support for the walk mostly due to balance issues. Performance deficits affecting function are gait instability, decreased lower extremity function, decreased ROM, impaired balance, impaired cardiopulmonary response to activity, impaired coordination, impaired endurance, impaired functional mobilty, impaired self care skills.     Rehab Prognosis:  Fair; patient would benefit from acute skilled OT services to address these deficits and reach maximum level of function.       Plan:     Patient to be seen 4 x/week to address the above listed problems via self-care/home management, therapeutic activities, therapeutic exercises  · Plan of Care Expires: 06/17/20  · Plan of Care Reviewed with: patient, daughter, son    Subjective     Pain/Comfort:  · Pain Rating 1: (did not rate pain has improved today and is assumed to be moderate.)  · Location - Side 1: Bilateral  · Location - Orientation 1: proximal  · Location 1: ankle  · Pain Rating Post-Intervention 1: (no change)    Objective:     Communicated with: RN prior to session.  Patient found seated up in a w/c.  with telemetry upon OT entry to room.    General Precautions: Standard, fall   Orthopedic  Precautions:N/A   Braces: N/A     Occupational Performance:     Functional Mobility/Transfers:  · Patient completed Sit <> Stand Transfer with minimum assistance and of 1 persons  with  hand-held assist   · Functional Mobility: Able to take 2 steps forward and 2 steps backward.         [facility] 6 Click ADL: 16    Treatment & Education:  -Pt edu on OT role/POC  -Importance of OOB activity with staff assistance  -Safety during functional t/f and mobility  - White board updated  - Multiple self care tasks/functional mobility completed-- assistance level noted above  - All questions/concerns answered within OT scope of practice    Completed some family (son, and daughter) transfer training.  Discussed how to get into and out of a SUV safely.     Patient left seated in w/c.  with all lines intact, call button in reach, RN notified and Daughter, son presentEducation:      GOALS:   Multidisciplinary Problems     Occupational Therapy Goals        Problem: Occupational Therapy Goal    Goal Priority Disciplines Outcome Interventions   Occupational Therapy Goal     OT, PT/OT Ongoing, Progressing    Description: Goals to be met by: 7/17/2020     Patient will increase functional independence with ADLs by performing:    UE Dressing with Modified [place] and Assistive Devices as needed.  LE Dressing with Supervision and Assistive Devices as needed.  Grooming while standing at sink with Supervision and Assistive Devices as needed.  Toileting from bedside commode with Supervision for hygiene and clothing management.   Bathing from  standing at sink with Supervision and Assistive Devices as needed.  Toilet transfer to bedside commode with Supervision and Assistive Devices as needed.                     Time Tracking:     OT Date of Treatment: 06/20/20  OT Start Time: 1424  OT Stop Time: 1512  OT Total Time (min): 48 min    Billable Minutes:Self Care/Home Management 15 mins  Therapeutic Activity 33 mins    Tez Rolon,  OT  6/20/2020

## 2020-06-20 NOTE — DISCHARGE SUMMARY
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       H&P/Summary:  Papito Bhakta is a 65 y.o. male with a PMHx of combined HF (EF 20%, G3DD), AICD in place, HLD, HTN, , chronic LLE edema due to venous statsis, who presents to the hospital as a transfer from [facility] for evaluation of AICD/pacemaker protrusion through skin.  The patient was in his usual state of health and states he had been cutting grass earlier in the day when he noted a "sticking" sensation near his pacemaker.  He subsequently took his shirt off and noted the device protruding through his skin prompting him to present to the OSH for evaluation.   he did note some drainage out of that area for about 1 week.  The patient denies any pain, numbness, weakness, fever, CP, SOB, or device discharges. He was transferred to [facility] for higher level of care.  AICD device easily seen protruding through the skin of the patient's left chest wall.  No drainage or erythema.  The patient is admitted to Hospital Medicine, EP consulted. ICD " interrogation ordered to ensure that patient is not pacemaker dependent.  Patient underwent device extraction on 06/17, and tolerated the procedure well.  Id was consulted.  Patient started on vancomycin, ceftriaxone immediately after the procedure.    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 Will need to wear life vest at discharge (already at bedside).  EP follow-up in 2 weeks  -infectious disease consulted, patient started on vancomycin and ceftriaxone  -Blood cultures from 06/15 and intraoperative cultures with no growth to date  -plan to discharge patient home with vancomycin/ceftriaxone for total 2 week course from device extraction.  End date 07/02/2020.  -PICC line placed  Discharge antibiotics:           1. Vancomycin 2g IV q12h  2. Ceftriaxone 2g IV q24h  - plan for 2 weeks from extraction 6/17  End date of IV antibiotics: 7/2/20  Weekly outpatient laboratory on Monday or Tuesday while on IV antibiotics.   · CBC  · CMP or BMP  · ESR and CRP  · Vancomycin trough. Target 15-20   If vancomycin trough is not at target (15-20) prior to discharge, the please perform vancomycin trough before their fourth outpatient dose.  Fax laboratory results to [facility] at [phone] with attn: Meena Rutledge NP  Outpatient Infectious Diseases clinic follow up will be arranged and found in patient calendar.     Chronic combined systolic and diastolic congestive heart failure  -Most recent echo on (1/8/2019) reveals an EF of 10%, left ventricular diastolic dysfunction, severely reduced right ventricular systolic function  -Continue symptomatic management by diuresis with lasix 80 mg p.o. daily  -Cont asa 325mg, ramipril,  pravastatin, Coreg 12.5 mg b.i.d.  -continue spironolactone given EF < 35%, NYHA class > II  -Continue to monitor on telemetry  -Monitor intake and output and obtain daily STANDING weights  -Fluid restriction to 1.5L per day and cardiac diet with salt restriction  -outpatient follow-up with cardiology     Essential hypertension  -Continue home ramipril, spironolactone, Lasix  -Coreg at reduced dose of 12.5 mg b.i.d.      Hyperlipidemia  -Continue home pravastatin 80 mg daily     Osteoarthritis:  -lidocaine patch for pain control  -started on gabapentin 100 mg t.i.d.  -patient did have some difficulty with mobility.  -OT/PT consulted, discharge with home health.  Discussed possible SNF placement with daughter inpatient, they are not interested in SNF placement at this time and preferred to go home with home health.  Will also place referral to social work outpatient.  In case, patient does not progress as expected with home health, they will have the option of pursuing SNF placement at that time.  -wheelchair ordered.       Labs:  Recent Labs   Lab 06/18/20 0420 06/19/20 0454 06/20/20 0312   WBC 6.86 6.49 6.08   HGB 11.8* 10.5* 10.6*   HCT 39.0* 35.5* 35.7*   * 109* 122*     Recent Labs   Lab 06/18/20 0420 06/19/20 0454 06/20/20 0312    137 136   K 4.7 3.8 4.1    104 103   CO2 28 26 25   BUN 16 16 18   CREATININE 1.3 1.1 1.0   GLU 88 133* 127*   CALCIUM 8.7 8.3* 8.6*   MG 1.9 1.8 2.0   PHOS 3.8 3.1 3.5     Recent Labs   Lab 06/16/20  0745 06/17/20  0443  06/18/20  0420 06/19/20 0454 06/20/20 0312   ALKPHOS  --   --    < > 63 58 57   ALT  --   --    < > 6* 7* 6*   AST  --   --    < > 13 12 12   ALBUMIN  --   --    < > 3.0* 2.6* 2.4*   PROT  --   --    < > 7.1 6.3 6.3   BILITOT  --   --    < > 1.0 0.7 0.5   INR 1.1 1.1  --   --   --   --     < > = values in this interval not displayed.      Recent Labs   Lab 06/17/20  1647   POCTGLUCOSE 118*     No results for input(s): CPK, CPKATELIN, MB,  TROPONINI in the last 72 hours.    ROS (Positive in Bold, otherwise negative)  Constitutional: fever, chills, night sweats  CV: chest pain, edema, palpitations  Resp: SOB, cough, sputum production  GI: changes in appetite, NVDC, pain, melena, hematochezia, GERD, hematemesis  : Dysuria, hematuria, urinary urgency, frequency  MSK: arthralgia/myalgia, joint swelling  Neuro/Psych: anxiety, depression    PEx   Temp:  [97.6 °F (36.4 °C)-98.9 °F (37.2 °C)]   Pulse:  [49-62]   Resp:  [17-24]   BP: ()/(51-68)   SpO2:  [89 %-97 %]      General: no distress   Lungs: clear to ausculation anteriorly and posteriorly   Heart: regular rate and rhythm   Abdomen: normal bowel sounds, soft, no tenderness   Extremities: no edema. No clubbing or cyanosis       Procedures:  ICD extraction, CXR    Consultants:  EP, ID, OT/PT    Current Discharge Medication List      START taking these medications    Details   cefTRIAXone (ROCEPHIN) 2 g/50 mL PgBk IVPB Inject 50 mLs (2 g total) into the vein once daily. for 13 days  Qty: 13 each, Refills: 0      gabapentin (NEURONTIN) 100 MG capsule Take 1 capsule (100 mg total) by mouth 3 (three) times daily.  Qty: 90 capsule, Refills: 1      lidocaine (LIDODERM) 5 % Place 1 patch onto the skin once daily. Remove & Discard patch within 12 hours or as directed by MD  Qty: 30 patch, Refills: 0      vancomycin HCl in 5 % dextrose (VANCOMYCIN IN DEXTROSE 5 %) 2 gram/500 mL Soln Inject 500 mLs (2,000 mg total) into the vein every 12 (twelve) hours. for 12 days         CONTINUE these medications which have CHANGED    Details   carvediloL (COREG) 12.5 MG tablet Take 1 tablet (12.5 mg total) by mouth 2 (two) times daily.  Qty: 60 tablet, Refills: 11    Comments: .         CONTINUE these medications which have NOT CHANGED    Details   aspirin 325 MG tablet Take 325 mg by mouth once daily.      pravastatin (PRAVACHOL) 80 MG tablet Take 1 tablet (80 mg total) by mouth once daily.  Qty: 90 tablet, Refills: 3     Associated Diagnoses: Other hyperlipidemia      ramipril (ALTACE) 10 MG capsule Take 1 capsule (10 mg total) by mouth once daily.  Qty: 90 capsule, Refills: 3      spironolactone (ALDACTONE) 25 MG tablet Take 1 tablet (25 mg total) by mouth once daily.  Qty: 90 tablet, Refills: 3      diclofenac sodium (VOLTAREN) 1 % Gel Apply 2 g topically 4 (four) times daily.  Qty: 100 g, Refills: 1    Associated Diagnoses: Injury of left rotator cuff, initial encounter      furosemide (LASIX) 80 MG tablet Take 1 tablet (80 mg total) by mouth once daily.  Qty: 90 tablet, Refills: 3    Associated Diagnoses: Chronic combined systolic and diastolic congestive heart failure             The relevant and important risks, side effects, and benefits of their medications were reviewed with patient during hospitalization and at discharge. The patient was given the opportunity to discuss and ask questions about their medications, including target symptoms, potential risks, side effects and benefits of their medications, as well as their expected prognosis if non-medication treatment options were chosen.  The patient expresses understanding of all these options and information and voluntarily consents to treatment.    Discharge Diet:cardiac diet and diabetic diet: 2000 calorie with Normal Fluid intake of 1500 - 2000 mL per day    Activity: activity as tolerated    Discharge Condition: Stable    Disposition: Home-Health Care Svc    Tests pending at the time of discharge: none      Time spent  on the discharge of the patient including review of hospital course with the patient. reviewing discharge medications and arranging follow-up care: 40 mins    Discharge examination Patient was seen and examined on the date of discharge and determined to be suitable for discharge.    Discharge plan and follow up:  It is critical that you make your follow-up appointment(s). If you are discharged on the weekend or after business hours, or if we are  unable to schedule these appointments for you for any reason, you or a family member need to call during the next business day to schedule your appointment(s).    -Follow up with you PCP, Brynn To MD within 1-2 weeks as arranged with  and treatment team prior to discharge  -Take all medications as prescribed and listed above.     - Please return to ED or call your physician if you have:         1. Fevers > 101.5 unresponsive to tylenol.       2. Abdominal pain and/or distention       3. Intractable nausea, vomiting or diarrhea       4. Inability to tolerate adequate oral intake of food       5. Neurologic changes, chest pain or shortness of breath         Future Appointments   Date Time Provider Department Center   7/6/2020  9:30 AM YASH Sloan Jr. [facility] [address]   7/7/2020 10:00 AM Brynn To MD [facility]   7/8/2020  8:40 AM Makayla Long MD [facility]     Follow-up with PCP, EP, infectious disease      Nieves Medrano MD  Hospital Medicine Staff  [phone] pager

## 2020-06-20 NOTE — PLAN OF CARE
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       Future Appointments   Date Time Provider Department Center   7/6/2020  9:30 AM [facility] [address]   7/7/2020 10:00 AM Brynn To MD [facility]   7/8/2020  8:40 AM Makayla Long MD [facility]     Follow-up Information     Jim Kwong MD.    Specialties: Electrophysiology, Cardiology  Contact information:  [address]  [facility] [address]  [phone]             Brynn To MD.    Specialty: Internal Medicine  Contact information:  [address]  [place] [id number] [phone]             [facility] INFECTIOUS DISEASE. Schedule an appointment as soon as possible for a visit in 2 weeks.    Specialty: Infectious Diseases  Contact information:  [address]  [facility] [address]  [phone]           [facility].    Specialty: Emergency Medicine  Why: As needed, If symptoms worsen  Contact information:  [address] [facility] [id number] [phone]                    I have seen and examined this patient face to face today. My clinical findings that support the need for the home health skilled services and home bound status are the following:  Weakness/numbness causing balance and gait disturbance due to Infection and Weakness/Debility making it taxing to leave home.    Allergies:Review of patient's allergies indicates:  No Known Allergies    Diet: cardiac diet and diabetic diet: 2000 calorie    Activities: activity as tolerated    Nursing:   SN to complete comprehensive assessment including routine vital signs. Instruct on disease process and s/s of complications to report to MD. Review/verify medication list sent home with the patient at time of discharge  and instruct patient/caregiver as needed. Frequency may be adjusted depending on start of care date.    Notify MD if SBP > 160 or < 90; DBP > 90 or < 50; HR > 120 or < 50; Temp > 101;       CONSULTS:    Physical Therapy to evaluate and treat. Evaluate for home safety and equipment needs; Establish/upgrade home exercise program. Perform / instruct on therapeutic exercises, gait training, transfer training, and Range of Motion.  Occupational Therapy to evaluate and treat. Evaluate home environment for safety and equipment needs. Perform/Instruct on transfers, ADL training, ROM, and therapeutic exercises.  Aide to provide assistance with personal care, ADLs, and vital signs.    MISCELLANEOUS CARE:  Home Infusion Therapy:   SN to perform Infusion Therapy/Central Line Care.  Review Central Line Care & Central Line Flush with patient.    1. Vancomycin 2g IV q12h  2. Ceftriaxone 2g IV q24h  - plan for 2 weeks from extraction 6/17     End date of IV antibiotics: 7/2/20     Weekly outpatient laboratory on Monday or Tuesday while on IV antibiotics.   · CBC  · CMP or BMP  · ESR and CRP  · Vancomycin trough. Target 15-20    Last dose given: 6/19/2020                       Home dose due: 6/20/2020    If vancomycin trough is  not at target (15-20) prior to discharge, the please perform vancomycin trough before their fourth outpatient dose.     Fax laboratory results to [facility] at [phone] with attn: Meena Rutledge NP     Outpatient Infectious Diseases clinic follow up will be arranged and found in patient calendar.    Scrub the Hub: Prior to accessing the line, always perform a 30 second alcohol scrub  Each lumen of the central line is to be flushed at least daily with 10 mL Normal Saline and 3 mL Heparin flush (10 units/mL)  Skilled Nurse (SN) may draw blood from IV access  Blood Draw Procedure:   - Aspirate at least 5 mL of blood   - Discard   - Obtain specimen   - Change injection cap   - Flush with 20 mL Normal Saline followed by a                 3-5 mL Heparin flush (10 units/mL)  Central :   - Sterile dressing changes are done weekly and as needed.   - Use chlor-hexadine scrub to cleanse site, apply Biopatch to insertion site,       apply securement device dressing   - Injection caps are changed weekly and after EVERY lab draw.   - If sterile gauze is under dressing to control oozing,                 dressing change must be performed every 24 hours until gauze is not needed.    Medications: Review discharge medications with patient and family and provide education.      Current Discharge Medication List      START taking these medications    Details   cefTRIAXone (ROCEPHIN) 2 g/50 mL PgBk IVPB Inject 50 mLs (2 g total) into the vein once daily. for 13 days  Qty: 13 each, Refills: 0      gabapentin (NEURONTIN) 100 MG capsule Take 1 capsule (100 mg total) by mouth 3 (three) times daily.  Qty: 90 capsule, Refills: 1      lidocaine (LIDODERM) 5 % Place 1 patch onto the skin once daily. Remove & Discard patch within 12 hours or as directed by MD  Qty: 30 patch, Refills: 0      vancomycin HCl in 5 % dextrose (VANCOMYCIN IN DEXTROSE 5 %) 2 gram/500 mL Soln Inject 500 mLs (2,000 mg total) into the vein every 12  (twelve) hours. for 12 days         CONTINUE these medications which have CHANGED    Details   carvediloL (COREG) 12.5 MG tablet Take 1 tablet (12.5 mg total) by mouth 2 (two) times daily.  Qty: 60 tablet, Refills: 11    Comments: .         CONTINUE these medications which have NOT CHANGED    Details   aspirin 325 MG tablet Take 325 mg by mouth once daily.      pravastatin (PRAVACHOL) 80 MG tablet Take 1 tablet (80 mg total) by mouth once daily.  Qty: 90 tablet, Refills: 3    Associated Diagnoses: Other hyperlipidemia      ramipril (ALTACE) 10 MG capsule Take 1 capsule (10 mg total) by mouth once daily.  Qty: 90 capsule, Refills: 3      spironolactone (ALDACTONE) 25 MG tablet Take 1 tablet (25 mg total) by mouth once daily.  Qty: 90 tablet, Refills: 3      diclofenac sodium (VOLTAREN) 1 % Gel Apply 2 g topically 4 (four) times daily.  Qty: 100 g, Refills: 1    Associated Diagnoses: Injury of left rotator cuff, initial encounter      furosemide (LASIX) 80 MG tablet Take 1 tablet (80 mg total) by mouth once daily.  Qty: 90 tablet, Refills: 3    Associated Diagnoses: Chronic combined systolic and diastolic congestive heart failure             I certify that this patient is confined to his home and needs intermittent skilled nursing care, physical therapy and occupational therapy.    Nieves Medrano MD  6/20/2020 3:38 PM  Department of Hospital medicine

## 2020-06-20 NOTE — PLAN OF CARE
Pt to be discharged home today with IV antibiotics and home health services.  CM confirmed patient's address and phone.     CM put in call to [facility] and [facility].  Awaiting call back.    Pt states at discharge his family will .    [facility] to bring meds to hospital and do teaching with patient, before he is discharged.     CM  Updated nurse on discharge plan for today.      Millie PATRICIO, RN

## 2020-06-20 NOTE — PLAN OF CARE
D/c instructions given to patient and daughter. IV and telemetry removed. AVS confirmed in Epic. Pt verbalized complete understanding of home care discharge instructions and follow up appointments. Pt had prescriptions delivered via bedside delivery. Escort notified of discharge. Pt left with all belongings.

## 2020-06-20 NOTE — PT/OT/SLP PROGRESS
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  He presents with improved pain control and improved mobility. Pt was able to stand up w/ assistance and take a few steps forward and back.  Pt wants to return home, but is at odds w/ his daughter that worries about him falling and her not being able to get him up.  Family was appreciative of the family training regarding t/f to and from a vehicle and wheelchair. Performance deficits affecting function are gait instability, decreased lower extremity function, decreased ROM, impaired balance, impaired cardiopulmonary response to activity, impaired coordination, impaired endurance, impaired functional mobilty, impaired self care skills.     Rehab Prognosis:  Fair; patient would benefit from acute skilled OT services to address these deficits and reach maximum level of function.       Plan:     Patient to be seen 4 x/week to address the above listed problems via self-care/home management, therapeutic activities, therapeutic exercises  · Plan of Care Expires: 06/17/20  · Plan of Care Reviewed with: patient, daughter, son    Subjective     Pain/Comfort:  ·      Objective:     Communicated with: RN prior to session.  Patient found in wheelchair with telemetry upon OT entry to room.    General Precautions: Standard, fall   Orthopedic Precautions:N/A   Braces:       Occupational Performance:     Functional Mobility/Transfers:  · Patient completed Sit <> Stand Transfer with minimum assistance  with  hand-held  assist   Functional Mobility:  Pt was able to walk 2-3 steps forward/backward w/ HHAx1, and Moderate Assistance (no AD)      [facility] 6 Click ADL:      Treatment & Education:  -Pt edu on OT role/POC  -Importance of OOB activity with staff assistance  -Safety during functional t/f and mobility  - White board updated  - Multiple self care tasks/functional mobility completed-- assistance level noted above  - All questions/concerns answered within OT scope of practice    Discussed and demonstrated safety during transfers for family.  Discussed setup for transfer from wheelchair to car and back w/ family.     Patient left in wheelchair with all lines intact, call button in reach, RN notified and son & daughter presentEducation:      GOALS:   Multidisciplinary Problems     Occupational Therapy Goals        Problem: Occupational Therapy Goal    Goal Priority Disciplines Outcome Interventions   Occupational Therapy Goal     OT, PT/OT Ongoing, Progressing    Description: Goals to be met by: 7/17/2020     Patient will increase functional independence with ADLs by performing:    UE Dressing with Modified [place] and Assistive Devices as needed.  LE Dressing with Supervision and Assistive Devices as needed.  Grooming while standing at sink with Supervision and Assistive Devices as needed.  Toileting from bedside commode with Supervision for hygiene and clothing management.   Bathing from  standing at sink with Supervision and Assistive Devices as needed.  Toilet transfer to bedside commode with Supervision and Assistive Devices as needed.                     Time Tracking:     OT Date of Treatment: 06/20/20  OT Start Time: 1424  OT Stop Time: 1512  OT Total Time (min): 48 min    Billable Minutes:Self Care/Home Management 30 mins  Therapeutic Activity 18 mins    Tez Rolon OT  6/22/2020

## 2020-06-20 NOTE — PLAN OF CARE
Feli complete. Pt tolerated session well. Initiate OT POC.  Discharge Recommendation: Home with home health after pain management   DME rec: None      Problem: Occupational Therapy Goal  Goal: Occupational Therapy Goal  Description: Goals to be met by: 7/17/2020     Patient will increase functional independence with ADLs by performing:    UE Dressing with Modified [place] and Assistive Devices as needed.  LE Dressing with Supervision and Assistive Devices as needed.  Grooming while standing at sink with Supervision and Assistive Devices as needed.  Toileting from bedside commode with Supervision for hygiene and clothing management.   Bathing from  standing at sink with Supervision and Assistive Devices as needed.  Toilet transfer to bedside commode with Supervision and Assistive Devices as needed.    Outcome: Ongoing, Progressing   Tez SHERWOOD  6/19/2020

## 2020-06-20 NOTE — PLAN OF CARE
CM did receive a call from [facility], who stated they would be mixing drugs and will deliver to hospital and come and do teaching.  Also, [facility] could not take patient.  Pt set up with [facility], [facility] who will see patient tomorrow.  Both referrals were sent thru the [facility].      After patient receives his IV med delivery, and has his teaching, he will be able to discharge home.     CM also updated patient's daughter on  Discharge and let her know there will be a copay, per [facility], they stated they would not know what it is till about 2 pm.    Millie AGUILAR, CM

## 2020-06-20 NOTE — PLAN OF CARE
CM did go to room to discuss discharge plan with both daughter and patient.  MD did put in order for wheelchair, CM wanted to make sure they did not need any other equipment.  They stated they did not.  Also, CM did offer to send patient home in ambulance and daughter declined.  Wheelchair will be delivered to the patient's room.      MD also, spoke with family about medications and discharge plan.  Family wanted to know if they felt patient was not progressing at home, would they be able to still go to SNF.  CM let them know that home health nurses would be able to help patient with that.      Pt and daughter are awaiting IV antibiotics and teaching.  Once this is done and wheelchair is delivered, pt will be ready to discharge home. They will have antibiotics thru [facility] and home health services thru [facility] home [facility].    CM let them know that home health provided PT, OT, and aide to provide personal care, with ADLs.  Nurses would draw labs and do line care.      Nurse was updated on plan.

## 2020-06-20 NOTE — CONSULTS
[facility] consulted for  Papito Bhakta to be followed through telemedicine modalities.    The patient is currently unable to be transferred due to service capacity.    Please re-consult.    Laura Valladares

## 2020-06-21 LAB
BACTERIA BLD CULT: NORMAL
BACTERIA BLD CULT: NORMAL
BLD PROD TYP BPU: NORMAL
BLOOD UNIT EXPIRATION DATE: NORMAL
BLOOD UNIT TYPE CODE: 5100
BLOOD UNIT TYPE: NORMAL
CODING SYSTEM: NORMAL
DISPENSE STATUS: NORMAL
NUM UNITS TRANS PACKED RBC: NORMAL

## 2020-06-21 NOTE — PLAN OF CARE
06/21/20 0716   Final Note   Assessment Type Final Discharge Note   Anticipated Discharge Disposition IV Therapy   Hospital Follow Up  Appt(s) scheduled? Yes

## 2020-06-22 ENCOUNTER — PATIENT OUTREACH (OUTPATIENT)
Dept: ADMINISTRATIVE | Facility: CLINIC | Age: 65
End: 2020-06-22

## 2020-06-22 LAB
BACTERIA SPEC ANAEROBE CULT: NORMAL
BACTERIA SPEC ANAEROBE CULT: NORMAL

## 2020-06-22 NOTE — PATIENT INSTRUCTIONS
Discharge Instructions for Pacemaker Implantation  You have had a procedure to insert a pacemaker. Once inside your body, this small electronic device helps keep your heart from beating too slowly. A pacemaker cant fix existing heart problems. But it can help you feel better and have more energy. As you recover, follow all of the instructions you are given, including those below.  Activity  · Dont drive until your doctor says its OK. Plan to have someone drive you home after the procedure.  · Follow the instructions you are given about limiting your activity.  · If you are fitted with an arm sling, keep your arm in the sling for as long as your doctor tells you to. Most often, the sling will be removed the following day though you may be instructed to sleep with it on for a period to prevent damage to the pacemaker while it's healing.  · Do not raise your arm on the incision side above shoulder level or stretch your arm behind your back for as long as directed by your doctor. This gives the leads a chance to secure themselves inside your heart.  · Ask your doctor when you can expect to return to work. Depending on the type of work you do, you may have restrictions until your cardiologist clears you for unrestricted activity.  · You can still exercise. Its good for your body and your heart. Talk with your doctor about an exercise plan and the types of exercise to minimize the risk of damaging your pacemaker.  Other precautions  · Follow your doctor's directions carefully for wound care. If there is a dressing, ask whether you should remove it or keep it on until your next visit. Never put any creams, lotions, or products like peroxide on an incision unless your doctor tells you to. Do not get the incision wet until your doctor says it's OK.  · Every day, take your temperature and check your incision for signs of infection (redness, swelling, drainage, or warmth). Do this for 7 days or as advised by your  doctor.  · Learn to take your own pulse. Keep a record of your results. Ask your doctor what pulse rate means you should call for medical attention.  · Before you receive any treatment, tell all healthcare providers (including your dentist) that you have a pacemaker.  · You will be given an ID card that contains information about your pacemaker. Always carry this card with you. You can show this card if your pacemaker sets off a metal detector. You should also show it to avoid screening with a hand-held security wand.  · Keep your cell phone away from your pacemaker. Dont carry the phone in your shirt pocket overlying the pacemaker, even when its turned off.  · Avoid strong magnets. Examples are those used in MRIs or in hand-held security wands. Some pacemakers are now safe to use with MRI scanners. Ask your doctor if you have such a pacemaker.  · Avoid strong electrical fields. Examples are those made by radio transmitting towers, ham radios, and heavy-duty electrical equipment.  · Avoid leaning over the open vasquez of a running car. A running engine creates an electrical field. Most household and yard appliances will not cause any problems. If you use any large power tools, such as an industrial , talk with your doctor.   Follow-up care  · See your cardiologist in the next 7 to 10 days. Call and make an appointment as soon as you get home.  · Make regular follow-up appointments with your doctor. He or she will check the pacemaker to make sure its working properly.  · Plan on having periodic check-ups with your healthcare provider to evaluate the battery life of your pacemaker. Depending on your device and how much your body uses the pacing functions of the pacemaker, you will need a new device generator implanted at some point, generally about every 5 to 7 years.  · Some pacemakers have a built-in antenna that can transmit information such as trouble alerts over the internet to your doctor. Ask your  doctor if your pacemaker is capable of remote monitoring.  When should I call my healthcare provider?  Call 911 if you have:  · Chest pain  · Severe trouble breathing     Call your healthcare provider right away if you have any of these:  · Dizziness  · Lack of energy  · Fainting spells  · Twitching chest muscles  · Rapid pulse or pounding heartbeat  · Shortness of breath  · Pain around your pacemaker  · Fever above 100.4°F (38°C) or other signs of infection (redness, swelling, drainage, or warmth at the incision site)  · Your incision is not healing or your incision separates or opens  · Hiccups that wont stop  · Redness, severe swelling, drainage, worsening pain, bleeding, or warmth at the incision site  · If your pacemaker generator feels loose or like it is wiggling in the pocket under the skin     © 2445-1641 [employer]. [address] [place] [address]. All rights reserved. This information is not intended as a substitute for professional medical care. Always follow your healthcare professional's instructions.    Sepsis   Sepsis occurs when your body responds to bacteremia - the presence of bacteria in your bloodstream. Sepsis can be deadly. Blood pressure may drop and the lungs and kidneys may start to fail. Emergency care for sepsis is crucial   When to Go to the Emergency Department (ED)   Sepsis is a medical emergency. Go to the nearest emergency department if a fever is present with any of these symptoms:   Chills and shaking   Fever or low body temperature (hypothermia)   Rapid heartbeat and breathing; shortness of breath   Nausea or vomiting   Confusion, dizziness   Skin rash   Decreased urination  © 2453-3401 [employer], [address] [place], [place] [address]. All rights reserved. This information is not intended as a substitute for professional medical care. Always follow your healthcare professional's instructions

## 2020-06-24 ENCOUNTER — TELEPHONE (OUTPATIENT)
Dept: INFECTIOUS DISEASES | Facility: HOSPITAL | Age: 65
End: 2020-06-24

## 2020-06-24 LAB — BACTERIA SPEC ANAEROBE CULT: NORMAL

## 2020-06-24 NOTE — TELEPHONE ENCOUNTER
Infectious Disease - Lab follow up     Dx: Pacemaker pocket infection s/p extraction 6/17. Cultures negative  Antibiotics: Vancomycin 2 g IV q 12 hours, ceftriaxone 2 grams IV q 24 hours  Estimated End Date: 7/2/20    WBC - 5.3  H/H - 10.5/33.5  Platelets - 160  Creatinine - .96  ESR - 17  CRP - 67.6  (pre-operative wnl).  Will trend  AST/ALT - 14/10  Vancomycin Trough - 18.4    Continue weekly labs.  Trend CRP  Trough therapeutic.  Continue current dose of vancomycin

## 2020-06-25 ENCOUNTER — TELEPHONE (OUTPATIENT)
Dept: ELECTROPHYSIOLOGY | Facility: CLINIC | Age: 65
End: 2020-06-25

## 2020-06-25 DIAGNOSIS — I42.8 NICM (NONISCHEMIC CARDIOMYOPATHY): ICD-10-CM

## 2020-06-25 DIAGNOSIS — I50.42 CHRONIC COMBINED SYSTOLIC AND DIASTOLIC CONGESTIVE HEART FAILURE: Primary | ICD-10-CM

## 2020-06-25 DIAGNOSIS — I50.9 CONGESTIVE HEART FAILURE, UNSPECIFIED HF CHRONICITY, UNSPECIFIED HEART FAILURE TYPE: ICD-10-CM

## 2020-06-26 PROCEDURE — G0180 MD CERTIFICATION HHA PATIENT: HCPCS | Mod: ,,, | Performed by: FAMILY MEDICINE

## 2020-06-26 PROCEDURE — G0180 PR HOME HEALTH MD CERTIFICATION: ICD-10-PCS | Mod: ,,, | Performed by: FAMILY MEDICINE

## 2020-06-30 ENCOUNTER — TELEPHONE (OUTPATIENT)
Dept: INFECTIOUS DISEASES | Facility: CLINIC | Age: 65
End: 2020-06-30

## 2020-06-30 ENCOUNTER — NURSE TRIAGE (OUTPATIENT)
Dept: ADMINISTRATIVE | Facility: CLINIC | Age: 65
End: 2020-06-30

## 2020-06-30 NOTE — TELEPHONE ENCOUNTER
Attempted to contact the pt x1 at [phone] regarding day 13 of [facility] Post Procedural Symptom Tracker.  Unable to reach patient.  Someone will reach out again tomorrow.      Reason for Disposition   No answer.  First attempt to contact caller.  Follow-up call scheduled within 15 minutes.    Protocols used: NO CONTACT OR DUPLICATE CONTACT CALL-A-OH

## 2020-07-01 ENCOUNTER — TELEPHONE (OUTPATIENT)
Dept: INFECTIOUS DISEASES | Facility: HOSPITAL | Age: 65
End: 2020-07-01

## 2020-07-01 NOTE — TELEPHONE ENCOUNTER
Infectious Disease - Lab follow up   Labs of 6/29/20     Dx: Pacemaker pocket infection s/p extraction 6/17. Cultures negative  Antibiotics: Vancomycin 2 g IV q 12 hours, ceftriaxone 2 grams IV q 24 hours  Estimated End Date: 7/2/20     WBC - 5.6  H/H - 11.2/37  Platelets - 251  Creatinine - 1.06  ESR - 17  CRP - 17.6.  Down from 67.6  (pre-operative was wnl).  Will trend  AST/ALT - 14/10  Vancomycin Trough - 20.4     Patient has ID follow up on 7/6.  Will continue IV abx until can be seen in follow up given persistent elevated inflammatory markers (though downtrending)  Trough 20.4,  Appears to be accumulating.  Reviewed with ID PharmD.   Will hold dose tonight and decrease to 1,750 q 12 hours starting tomorrow.   Repeat trough and bmp before 3rd dose on Friday and fax results to me.   Orders given to Pasquale at [facility] who will contact patient.

## 2020-07-03 ENCOUNTER — LAB VISIT (OUTPATIENT)
Dept: LAB | Facility: HOSPITAL | Age: 65
End: 2020-07-03
Attending: INTERNAL MEDICINE
Payer: MEDICARE

## 2020-07-03 DIAGNOSIS — D59.4: Primary | ICD-10-CM

## 2020-07-03 LAB
ANION GAP SERPL CALC-SCNC: 10 MMOL/L (ref 8–16)
BUN SERPL-MCNC: 16 MG/DL (ref 8–23)
CALCIUM SERPL-MCNC: 9.1 MG/DL (ref 8.7–10.5)
CHLORIDE SERPL-SCNC: 102 MMOL/L (ref 95–110)
CO2 SERPL-SCNC: 29 MMOL/L (ref 23–29)
CREAT SERPL-MCNC: 1.1 MG/DL (ref 0.5–1.4)
EST. GFR  (AFRICAN AMERICAN): >60 ML/MIN/1.73 M^2
EST. GFR  (NON AFRICAN AMERICAN): >60 ML/MIN/1.73 M^2
GLUCOSE SERPL-MCNC: 94 MG/DL (ref 70–110)
POTASSIUM SERPL-SCNC: 3.8 MMOL/L (ref 3.5–5.1)
SODIUM SERPL-SCNC: 141 MMOL/L (ref 136–145)
VANCOMYCIN TROUGH SERPL-MCNC: 19.8 UG/ML (ref 10–22)

## 2020-07-03 PROCEDURE — 80202 ASSAY OF VANCOMYCIN: CPT | Mod: HCNC

## 2020-07-03 PROCEDURE — 80048 BASIC METABOLIC PNL TOTAL CA: CPT | Mod: HCNC

## 2020-07-06 ENCOUNTER — OFFICE VISIT (OUTPATIENT)
Dept: INFECTIOUS DISEASES | Facility: CLINIC | Age: 65
End: 2020-07-06
Payer: MEDICARE

## 2020-07-06 ENCOUNTER — CLINICAL SUPPORT (OUTPATIENT)
Dept: CARDIOLOGY | Facility: CLINIC | Age: 65
End: 2020-07-06
Attending: PHYSICIAN ASSISTANT
Payer: MEDICARE

## 2020-07-06 ENCOUNTER — TELEPHONE (OUTPATIENT)
Dept: WOUND CARE | Facility: CLINIC | Age: 65
End: 2020-07-06

## 2020-07-06 ENCOUNTER — INFUSION (OUTPATIENT)
Dept: INFECTIOUS DISEASES | Facility: HOSPITAL | Age: 65
End: 2020-07-06
Attending: INTERNAL MEDICINE
Payer: MEDICARE

## 2020-07-06 VITALS
HEIGHT: 77 IN | HEART RATE: 67 BPM | TEMPERATURE: 98 F | DIASTOLIC BLOOD PRESSURE: 81 MMHG | BODY MASS INDEX: 37.19 KG/M2 | WEIGHT: 315 LBS | SYSTOLIC BLOOD PRESSURE: 143 MMHG

## 2020-07-06 DIAGNOSIS — R60.0 LEG EDEMA, RIGHT: Primary | ICD-10-CM

## 2020-07-06 DIAGNOSIS — R60.0 LEG EDEMA, RIGHT: ICD-10-CM

## 2020-07-06 DIAGNOSIS — T81.30XA WOUND DEHISCENCE: ICD-10-CM

## 2020-07-06 DIAGNOSIS — T82.897S EROSION OF PACEMAKER POCKET DUE TO AND NOT CONCURRENT WITH IMPLANTATION OF CARDIAC PACEMAKER: ICD-10-CM

## 2020-07-06 PROCEDURE — 1101F PR PT FALLS ASSESS DOC 0-1 FALLS W/OUT INJ PAST YR: ICD-10-PCS | Mod: HCNC,CPTII,S$GLB, | Performed by: PHYSICIAN ASSISTANT

## 2020-07-06 PROCEDURE — 3008F PR BODY MASS INDEX (BMI) DOCUMENTED: ICD-10-PCS | Mod: HCNC,CPTII,S$GLB, | Performed by: PHYSICIAN ASSISTANT

## 2020-07-06 PROCEDURE — 99213 PR OFFICE/OUTPT VISIT, EST, LEVL III, 20-29 MIN: ICD-10-PCS | Mod: HCNC,S$GLB,, | Performed by: PHYSICIAN ASSISTANT

## 2020-07-06 PROCEDURE — 1101F PT FALLS ASSESS-DOCD LE1/YR: CPT | Mod: HCNC,CPTII,S$GLB, | Performed by: PHYSICIAN ASSISTANT

## 2020-07-06 PROCEDURE — 99213 OFFICE O/P EST LOW 20 MIN: CPT | Mod: HCNC,S$GLB,, | Performed by: PHYSICIAN ASSISTANT

## 2020-07-06 PROCEDURE — 99999 PR PBB SHADOW E&M-EST. PATIENT-LVL III: ICD-10-PCS | Mod: PBBFAC,HCNC,, | Performed by: PHYSICIAN ASSISTANT

## 2020-07-06 PROCEDURE — 3079F PR MOST RECENT DIASTOLIC BLOOD PRESSURE 80-89 MM HG: ICD-10-PCS | Mod: HCNC,CPTII,S$GLB, | Performed by: PHYSICIAN ASSISTANT

## 2020-07-06 PROCEDURE — 93971 EXTREMITY STUDY: CPT | Mod: HCNC,RT,S$GLB, | Performed by: INTERNAL MEDICINE

## 2020-07-06 PROCEDURE — 3077F SYST BP >= 140 MM HG: CPT | Mod: HCNC,CPTII,S$GLB, | Performed by: PHYSICIAN ASSISTANT

## 2020-07-06 PROCEDURE — 99999 PR PBB SHADOW E&M-EST. PATIENT-LVL III: CPT | Mod: PBBFAC,HCNC,, | Performed by: PHYSICIAN ASSISTANT

## 2020-07-06 PROCEDURE — 3077F PR MOST RECENT SYSTOLIC BLOOD PRESSURE >= 140 MM HG: ICD-10-PCS | Mod: HCNC,CPTII,S$GLB, | Performed by: PHYSICIAN ASSISTANT

## 2020-07-06 PROCEDURE — 93971 CV US DOPPLER VENOUS LEG RIGHT (CUPID ONLY): ICD-10-PCS | Mod: HCNC,RT,S$GLB, | Performed by: INTERNAL MEDICINE

## 2020-07-06 PROCEDURE — 3008F BODY MASS INDEX DOCD: CPT | Mod: HCNC,CPTII,S$GLB, | Performed by: PHYSICIAN ASSISTANT

## 2020-07-06 PROCEDURE — 3079F DIAST BP 80-89 MM HG: CPT | Mod: HCNC,CPTII,S$GLB, | Performed by: PHYSICIAN ASSISTANT

## 2020-07-06 RX ORDER — DOXYCYCLINE 100 MG/1
100 CAPSULE ORAL EVERY 12 HOURS
Qty: 30 CAPSULE | Refills: 0 | Status: ON HOLD | OUTPATIENT
Start: 2020-07-06 | End: 2020-09-30

## 2020-07-06 NOTE — PROGRESS NOTES
PICC line removed from RUE antecubital after sterile site prepped per protocol. PICC catheter tip visualized and intact. Pressure dressing applied with Vaseline gauze, 2x2 gauze and Coban. No redness, ecchymosis, edema, swelling, or drainage noted at site. Monitored for 30 minutes. Pt left in NAD. Instructions provided on post PICC discharge care, including followup notification instructions.

## 2020-07-06 NOTE — TELEPHONE ENCOUNTER
----- Message from Luis Carlos Ray MA sent at 7/6/2020 11:11 AM CDT -----  Good morning,   Patient has been referred to be seen with wound care. I am unable to schedule through [facility]. Can someone please assist me in scheduling this patient appointment?     Thank you,   Luis Carlos TINOCO   Infectious Disease  Ext: [phone]

## 2020-07-06 NOTE — PATIENT INSTRUCTIONS
Instructed to keep dressing in place for twenty four hours (11am 7/7/2020), Instructed to monitor for s/s of infections which includes, but is not limited to; fever greater than 100.4, redness, swelling/drainage to site, or any pain that is not relieved, Please call [phone] with any questions/concerns.

## 2020-07-07 ENCOUNTER — OFFICE VISIT (OUTPATIENT)
Dept: FAMILY MEDICINE | Facility: CLINIC | Age: 65
End: 2020-07-07
Payer: MEDICARE

## 2020-07-07 VITALS
OXYGEN SATURATION: 94 % | DIASTOLIC BLOOD PRESSURE: 80 MMHG | HEART RATE: 62 BPM | SYSTOLIC BLOOD PRESSURE: 114 MMHG | BODY MASS INDEX: 37.19 KG/M2 | TEMPERATURE: 98 F | HEIGHT: 77 IN | WEIGHT: 315 LBS

## 2020-07-07 DIAGNOSIS — R60.0 LOWER EXTREMITY EDEMA: ICD-10-CM

## 2020-07-07 DIAGNOSIS — I10 ESSENTIAL HYPERTENSION: Chronic | ICD-10-CM

## 2020-07-07 DIAGNOSIS — I50.42 CHRONIC COMBINED SYSTOLIC AND DIASTOLIC CONGESTIVE HEART FAILURE: ICD-10-CM

## 2020-07-07 DIAGNOSIS — T82.7XXD INFECTION OF PACEMAKER POCKET, SUBSEQUENT ENCOUNTER: ICD-10-CM

## 2020-07-07 DIAGNOSIS — E78.49 OTHER HYPERLIPIDEMIA: ICD-10-CM

## 2020-07-07 DIAGNOSIS — Z09 HOSPITAL DISCHARGE FOLLOW-UP: Primary | ICD-10-CM

## 2020-07-07 PROCEDURE — 99999 PR PBB SHADOW E&M-EST. PATIENT-LVL IV: ICD-10-PCS | Mod: PBBFAC,HCNC,, | Performed by: FAMILY MEDICINE

## 2020-07-07 PROCEDURE — 99999 PR PBB SHADOW E&M-EST. PATIENT-LVL IV: CPT | Mod: PBBFAC,HCNC,, | Performed by: FAMILY MEDICINE

## 2020-07-07 PROCEDURE — 99499 RISK ADDL DX/OHS AUDIT: ICD-10-PCS | Mod: HCNC,S$GLB,, | Performed by: FAMILY MEDICINE

## 2020-07-07 PROCEDURE — 99499 UNLISTED E&M SERVICE: CPT | Mod: HCNC,S$GLB,, | Performed by: FAMILY MEDICINE

## 2020-07-07 PROCEDURE — 99214 PR OFFICE/OUTPT VISIT, EST, LEVL IV, 30-39 MIN: ICD-10-PCS | Mod: HCNC,S$GLB,, | Performed by: FAMILY MEDICINE

## 2020-07-07 PROCEDURE — 99214 OFFICE O/P EST MOD 30 MIN: CPT | Mod: HCNC,S$GLB,, | Performed by: FAMILY MEDICINE

## 2020-07-07 RX ORDER — FUROSEMIDE 80 MG/1
80 TABLET ORAL DAILY
Qty: 90 TABLET | Refills: 1 | Status: ON HOLD | OUTPATIENT
Start: 2020-07-07 | End: 2020-10-15 | Stop reason: HOSPADM

## 2020-07-07 NOTE — PROGRESS NOTES
Chief Complaint   Patient presents with    Hospital Follow Up       HPI  Papito Bhakta is a 65 y.o. male with multiple medical diagnoses as listed in the medical history and problem list that presents for follow-up for inpatient visit for infected pacemaker pocket 6/15/2020    Since his hospital stay he has had persistent swelling of his right lower extremity greater than his left. He has also had a negative US for blood clot. Taking lasix 80mg but the swelling use to improve however does not anymore  Saw ID yesterday and was placed on oral doxy as his wound has not fully healed  He is wearing a life vest    Hospital Course:  Erosion of pacemaker pocket due to and not concurrent with implantation of cardiac pacemaker  -patient with combined systolic and diastolic congestive heart failure, s/p ICD BIV placement in 02/2019  -Consulted Electrophysiology for AICD evaluation/stabilization  -patient underwent device extraction on 06/17/2020. Will need to wear life vest at discharge (already at bedside).  EP follow-up in 2 weeks  -infectious disease consulted, patient started on vancomycin and ceftriaxone  -Blood cultures from 06/15 and intraoperative cultures with no growth to date  -plan to discharge patient home with vancomycin/ceftriaxone for total 2 week course from device extraction.  End date 07/02/2020.  -PICC line placed  Discharge antibiotics:           1. Vancomycin 2g IV q12h  2. Ceftriaxone 2g IV q24h  - plan for 2 weeks from extraction 6/17  End date of IV antibiotics: 7/2/20  Weekly outpatient laboratory on Monday or Tuesday while on IV antibiotics.   · CBC  · CMP or BMP  · ESR and CRP  · Vancomycin trough. Target 15-20   If vancomycin trough is not at target (15-20) prior to discharge, the please perform vancomycin trough before their fourth outpatient dose.  Fax laboratory results to [facility] at [phone] with attn: Meena Rutledge NP  Outpatient Infectious Diseases clinic follow up will be arranged  "and found in patient calendar.  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   HENT: Negative for ear pain, postnasal drip, rhinorrhea, sinus pressure and sore throat.    Eyes: Negative for photophobia and visual disturbance.   Respiratory: Positive for shortness of breath. Negative for apnea, cough, chest tightness and wheezing.    Cardiovascular: Positive for leg swelling. Negative for chest pain and palpitations.   Gastrointestinal: Negative for abdominal pain, blood in stool, constipation, diarrhea, nausea and vomiting.   Genitourinary: Negative for difficulty urinating.   Musculoskeletal: Negative for arthralgias and joint swelling.   Skin: Negative for rash.   Neurological: Negative for facial asymmetry, speech difficulty, weakness, numbness and headaches.   Psychiatric/Behavioral: Negative for dysphoric mood.       Physical Exam  Vitals:    07/07/20 1019   BP: 114/80   BP Location: Left arm   Patient Position: Sitting   BP Method: Large (Manual)   Pulse: 62   Temp: 98.1 °F (36.7 °C)   TempSrc: Temporal   SpO2: (!) 94%   Weight: (!) 153.7 kg (338 lb 11.8 oz)   Height: 6' 5" (1.956 m)    Body mass index is 40.17 kg/m².  Weight: (!) 153.7 kg (338 lb " "11.8 oz)   Height: 6' 5" (195.6 cm)     Physical Exam  Vitals signs and nursing note reviewed.   Constitutional:       Appearance: He is well-developed.   HENT:      Head: Normocephalic and atraumatic.      Mouth/Throat:      Pharynx: No oropharyngeal exudate.   Cardiovascular:      Rate and Rhythm: Normal rate and regular rhythm.      Heart sounds: Normal heart sounds. No murmur. No friction rub. No gallop.    Pulmonary:      Effort: Pulmonary effort is normal. No respiratory distress.      Breath sounds: Wheezing present. No rales.      Comments: Expiratory wheeze present  Chest:      Chest wall: No tenderness.   Musculoskeletal:      Right lower leg: 3+ Edema present.   Lymphadenopathy:      Cervical: No cervical adenopathy.   Skin:     General: Skin is warm and dry.   Neurological:      Mental Status: He is alert and oriented to person, place, and time.   Psychiatric:         Behavior: Behavior normal.         ALLERGIES AND MEDICATIONS: updated and reviewed.  Review of patient's allergies indicates:  No Known Allergies  Medication List with Changes/Refills   Current Medications    ASPIRIN 325 MG TABLET    Take 325 mg by mouth once daily.    CARVEDILOL (COREG) 12.5 MG TABLET    Take 1 tablet (12.5 mg total) by mouth 2 (two) times daily.    DICLOFENAC SODIUM (VOLTAREN) 1 % GEL    Apply 2 g topically 4 (four) times daily.    DOXYCYCLINE (VIBRAMYCIN) 100 MG CAP    Take 1 capsule (100 mg total) by mouth every 12 (twelve) hours.    GABAPENTIN (NEURONTIN) 100 MG CAPSULE    Take 1 capsule (100 mg total) by mouth 3 (three) times daily.    LIDOCAINE (LIDODERM) 5 %    Place 1 patch onto the skin once daily. Remove & Discard patch within 12 hours or as directed by MD    PRAVASTATIN (PRAVACHOL) 80 MG TABLET    Take 1 tablet (80 mg total) by mouth once daily.    RAMIPRIL (ALTACE) 10 MG CAPSULE    Take 1 capsule (10 mg total) by mouth once daily.    SPIRONOLACTONE (ALDACTONE) 25 MG TABLET    Take 1 tablet (25 mg total) by " mouth once daily.   Changed and/or Refilled Medications    Modified Medication Previous Medication    FUROSEMIDE (LASIX) 80 MG TABLET furosemide (LASIX) 80 MG tablet       Take 1 tablet (80 mg total) by mouth once daily.    Take 1 tablet (80 mg total) by mouth once daily.       Assessment & Plan  1. Hospital discharge follow-up    2. Chronic combined systolic and diastolic congestive heart failure    3. Other hyperlipidemia    4. Infection of pacemaker pocket, subsequent encounter    5. Essential hypertension    6. Lower extremity edema        Problem List Items Addressed This Visit        Cardiac/Vascular    Chronic combined systolic and diastolic congestive heart failure  -PVD and CHF causing volume overload and KING    Relevant Medications    furosemide (LASIX) 80 MG tablet    Essential hypertension (Chronic)  -controlled    Other hyperlipidemia  -stable       ID    Infection of pacemaker pocket  -on doxy, continue follow up with ID      Other Visit Diagnoses     Hospital discharge follow-up    -  Primary  -stable s/p discharge, however increasing LE edema may benefit from lasix increase or aldactone increase    Lower extremity edema      -consider increasing aldactone vs lasix, will route to cardiology since he sees them tomorrow          Follow up in about 3 months (around 10/7/2020) for Follow up.    Other Orders Placed This Visit:  No orders of the defined types were placed in this encounter.

## 2020-07-07 NOTE — PROGRESS NOTES
Subjective:      Patient ID: Papito Kirkland is a 65 y.o. male.    Chief Complaint:No chief complaint on file.      History of Present Illness    Mr. Kirkland is a 65 year male who who was recently seen as inpt as his PPM had eroded through the skin.  It was removed with Dr. Kwong on 6/17/20.  Blood cultures and lead tips cultured negative.  No pocket cultures done and per prior convo with EP, no purulence seen, only necrosis.  Intraop JASSON was negative for vegetations.  He was discharged on Vancomycin and ceftriaxone empirically to complete 2 weeks.  His abx were recently extended as CRP was still a little elevated.  He was due to end on 7/2.  He is currently on a Life Vest.  Dr. Kwong is unable to replace device till later in July.  He is seen at this time.    Patient says he has been doing OK and tolerating the abx without event.  He has been retaining mor fluid and has had some increased SOB.  He has chronic LE edema R>L and it has worsened particularly in the RLE and it has become painful.  They improve with elevation.  He has been sedentary. He denies wound or PICC problems.  The patient denies any recent fever, chills, or sweats.      Review of Systems   Constitution: Positive for malaise/fatigue. Negative for chills, decreased appetite, fever, night sweats, weight gain and weight loss.   HENT: Negative for congestion, ear pain, hearing loss, hoarse voice, sore throat and tinnitus.    Eyes: Negative for blurred vision, redness and visual disturbance.   Cardiovascular: Positive for leg swelling. Negative for chest pain and palpitations.   Respiratory: Negative for cough, hemoptysis, shortness of breath, sputum production and wheezing.    Endocrine: Negative for cold intolerance and heat intolerance.   Hematologic/Lymphatic: Negative for adenopathy. Does not bruise/bleed easily.   Skin: Negative for dry skin, itching, rash and suspicious lesions.   Musculoskeletal: Negative for back pain, joint pain, myalgias and  neck pain.   Gastrointestinal: Negative for abdominal pain, constipation, diarrhea, heartburn, nausea and vomiting.   Genitourinary: Negative for dysuria, flank pain, frequency, hematuria, hesitancy and urgency.   Neurological: Negative for dizziness, headaches, numbness, paresthesias and weakness.   Psychiatric/Behavioral: Negative for depression and memory loss. The patient does not have insomnia and is not nervous/anxious.    Allergic/Immunologic: Negative for environmental allergies, HIV exposure, hives and persistent infections.     Objective:   Physical Exam  Constitutional:       General: He is not in acute distress.     Appearance: He is well-developed. He is not diaphoretic.       HENT:      Head: Normocephalic and atraumatic.   Cardiovascular:      Rate and Rhythm: Normal rate and regular rhythm.      Heart sounds: Normal heart sounds. No murmur. No friction rub. No gallop.    Pulmonary:      Effort: Pulmonary effort is normal. No respiratory distress.      Breath sounds: Normal breath sounds. No wheezing or rales.   Abdominal:      General: Bowel sounds are normal. There is no distension.      Palpations: Abdomen is soft. There is no mass.      Tenderness: There is no abdominal tenderness. There is no guarding or rebound.   Skin:     General: Skin is warm and dry.   Neurological:      Mental Status: He is alert and oriented to person, place, and time.   Psychiatric:         Behavior: Behavior normal.           Left chest wound        Assessment:       1. Leg edema, right    2. Erosion of pacemaker pocket due to and not concurrent with implantation of cardiac pacemaker    3. Wound dehiscence        No active sign of infection about wound - suspect possible post op hematoma underlying - completed > 2 weeks of empiric IV abx for pocket infection as lead cx and JASSON negative.  Concern that wound may dehisce  RLE pain and edema CF poor fluid management but DVT is considered given pain, tenderness and has been  more sedentary    Plan:   1. Stop IV abx and remove CVC  2. Start doxycycline 100mg po bid given wound slightly dehisced x 2 weeks  3. RLE US to check for DVT  4. INstructed patient to increase vitamin c and zinc for connective tissue.support  5. FU on 7/8 with cardiologist for fluid management  6. Referred to wound care for LUE wound management  7. OK to replace PPM on opposite side  8. FU 2 weeks with me in clinic to check on wound.

## 2020-07-07 NOTE — PROGRESS NOTES
Patient, Papito Bhakta (MRN #[id number]), presented with a recent Platelet count less than 150 K/uL consistent with the definition of thrombocytopenia (ICD10 - D69.6).    Platelets   Date Value Ref Range Status   06/20/2020 122 (L) 150 - 350 K/uL Final     The patient's thrombocytopenia was monitored, evaluated, addressed and/or treated. This addendum to the medical record is made on 07/07/2020.

## 2020-07-07 NOTE — TELEPHONE ENCOUNTER
Called patient no answer left voice message asking to return call to schedule appointment from referral from ID - called daughter's mobile number and spoke with daughter who got in touch with her father and appointment scheduled and accepted for 3pm tomorrow 7/8/2020 with Carmen Curiel.  Daughter instructed to report to 5th floor clinic [facility] location

## 2020-07-07 NOTE — Clinical Note
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 Renal function stable. I considered increasing lasix vs increasing aldactone but he is seeing you tomorrow so I held off incase you had different plans.     Brynn To MD

## 2020-07-08 ENCOUNTER — OFFICE VISIT (OUTPATIENT)
Dept: CARDIOLOGY | Facility: CLINIC | Age: 65
End: 2020-07-08
Payer: MEDICARE

## 2020-07-08 ENCOUNTER — OFFICE VISIT (OUTPATIENT)
Dept: WOUND CARE | Facility: CLINIC | Age: 65
End: 2020-07-08
Payer: MEDICARE

## 2020-07-08 VITALS
HEART RATE: 61 BPM | WEIGHT: 315 LBS | SYSTOLIC BLOOD PRESSURE: 114 MMHG | DIASTOLIC BLOOD PRESSURE: 68 MMHG | BODY MASS INDEX: 40.52 KG/M2 | RESPIRATION RATE: 20 BRPM | TEMPERATURE: 99 F

## 2020-07-08 VITALS
RESPIRATION RATE: 16 BRPM | OXYGEN SATURATION: 94 % | WEIGHT: 315 LBS | HEART RATE: 55 BPM | SYSTOLIC BLOOD PRESSURE: 124 MMHG | DIASTOLIC BLOOD PRESSURE: 80 MMHG | BODY MASS INDEX: 40.26 KG/M2

## 2020-07-08 DIAGNOSIS — E78.5 DYSLIPIDEMIA: ICD-10-CM

## 2020-07-08 DIAGNOSIS — T82.897S EROSION OF PACEMAKER POCKET DUE TO AND NOT CONCURRENT WITH IMPLANTATION OF CARDIAC PACEMAKER: ICD-10-CM

## 2020-07-08 DIAGNOSIS — I10 ESSENTIAL HYPERTENSION: ICD-10-CM

## 2020-07-08 DIAGNOSIS — T81.30XA WOUND DEHISCENCE: ICD-10-CM

## 2020-07-08 DIAGNOSIS — E66.01 MORBID OBESITY: ICD-10-CM

## 2020-07-08 DIAGNOSIS — I50.42 CHRONIC COMBINED SYSTOLIC AND DIASTOLIC HEART FAILURE: Primary | ICD-10-CM

## 2020-07-08 DIAGNOSIS — S21.102A OPEN WOUND OF LEFT CHEST WALL, INITIAL ENCOUNTER: Primary | ICD-10-CM

## 2020-07-08 PROCEDURE — 3078F PR MOST RECENT DIASTOLIC BLOOD PRESSURE < 80 MM HG: ICD-10-PCS | Mod: HCNC,CPTII,S$GLB, | Performed by: NURSE PRACTITIONER

## 2020-07-08 PROCEDURE — 3008F BODY MASS INDEX DOCD: CPT | Mod: HCNC,CPTII,S$GLB, | Performed by: INTERNAL MEDICINE

## 2020-07-08 PROCEDURE — 3078F DIAST BP <80 MM HG: CPT | Mod: HCNC,CPTII,S$GLB, | Performed by: NURSE PRACTITIONER

## 2020-07-08 PROCEDURE — 3079F DIAST BP 80-89 MM HG: CPT | Mod: HCNC,CPTII,S$GLB, | Performed by: INTERNAL MEDICINE

## 2020-07-08 PROCEDURE — 3074F PR MOST RECENT SYSTOLIC BLOOD PRESSURE < 130 MM HG: ICD-10-PCS | Mod: HCNC,CPTII,S$GLB, | Performed by: INTERNAL MEDICINE

## 2020-07-08 PROCEDURE — 99999 PR PBB SHADOW E&M-EST. PATIENT-LVL IV: ICD-10-PCS | Mod: PBBFAC,HCNC,, | Performed by: NURSE PRACTITIONER

## 2020-07-08 PROCEDURE — 3008F BODY MASS INDEX DOCD: CPT | Mod: HCNC,CPTII,S$GLB, | Performed by: NURSE PRACTITIONER

## 2020-07-08 PROCEDURE — 99203 PR OFFICE/OUTPT VISIT, NEW, LEVL III, 30-44 MIN: ICD-10-PCS | Mod: 25,HCNC,S$GLB, | Performed by: NURSE PRACTITIONER

## 2020-07-08 PROCEDURE — 3008F PR BODY MASS INDEX (BMI) DOCUMENTED: ICD-10-PCS | Mod: HCNC,CPTII,S$GLB, | Performed by: INTERNAL MEDICINE

## 2020-07-08 PROCEDURE — 3074F PR MOST RECENT SYSTOLIC BLOOD PRESSURE < 130 MM HG: ICD-10-PCS | Mod: HCNC,CPTII,S$GLB, | Performed by: NURSE PRACTITIONER

## 2020-07-08 PROCEDURE — 99499 UNLISTED E&M SERVICE: CPT | Mod: HCNC,S$GLB,, | Performed by: INTERNAL MEDICINE

## 2020-07-08 PROCEDURE — 99214 PR OFFICE/OUTPT VISIT, EST, LEVL IV, 30-39 MIN: ICD-10-PCS | Mod: HCNC,S$GLB,, | Performed by: INTERNAL MEDICINE

## 2020-07-08 PROCEDURE — 11042 DBRDMT SUBQ TIS 1ST 20SQCM/<: CPT | Mod: HCNC,S$GLB,, | Performed by: NURSE PRACTITIONER

## 2020-07-08 PROCEDURE — 99214 OFFICE O/P EST MOD 30 MIN: CPT | Mod: HCNC,S$GLB,, | Performed by: INTERNAL MEDICINE

## 2020-07-08 PROCEDURE — 1101F PR PT FALLS ASSESS DOC 0-1 FALLS W/OUT INJ PAST YR: ICD-10-PCS | Mod: HCNC,CPTII,S$GLB, | Performed by: INTERNAL MEDICINE

## 2020-07-08 PROCEDURE — 1101F PT FALLS ASSESS-DOCD LE1/YR: CPT | Mod: HCNC,CPTII,S$GLB, | Performed by: NURSE PRACTITIONER

## 2020-07-08 PROCEDURE — 3008F PR BODY MASS INDEX (BMI) DOCUMENTED: ICD-10-PCS | Mod: HCNC,CPTII,S$GLB, | Performed by: NURSE PRACTITIONER

## 2020-07-08 PROCEDURE — 3074F SYST BP LT 130 MM HG: CPT | Mod: HCNC,CPTII,S$GLB, | Performed by: NURSE PRACTITIONER

## 2020-07-08 PROCEDURE — 99999 PR PBB SHADOW E&M-EST. PATIENT-LVL III: ICD-10-PCS | Mod: PBBFAC,HCNC,, | Performed by: INTERNAL MEDICINE

## 2020-07-08 PROCEDURE — 11042 DEBRIDEMENT: ICD-10-PCS | Mod: HCNC,S$GLB,, | Performed by: NURSE PRACTITIONER

## 2020-07-08 PROCEDURE — 99999 PR PBB SHADOW E&M-EST. PATIENT-LVL III: CPT | Mod: PBBFAC,HCNC,, | Performed by: INTERNAL MEDICINE

## 2020-07-08 PROCEDURE — 99203 OFFICE O/P NEW LOW 30 MIN: CPT | Mod: 25,HCNC,S$GLB, | Performed by: NURSE PRACTITIONER

## 2020-07-08 PROCEDURE — 99999 PR PBB SHADOW E&M-EST. PATIENT-LVL IV: CPT | Mod: PBBFAC,HCNC,, | Performed by: NURSE PRACTITIONER

## 2020-07-08 PROCEDURE — 1101F PR PT FALLS ASSESS DOC 0-1 FALLS W/OUT INJ PAST YR: ICD-10-PCS | Mod: HCNC,CPTII,S$GLB, | Performed by: NURSE PRACTITIONER

## 2020-07-08 PROCEDURE — 3079F PR MOST RECENT DIASTOLIC BLOOD PRESSURE 80-89 MM HG: ICD-10-PCS | Mod: HCNC,CPTII,S$GLB, | Performed by: INTERNAL MEDICINE

## 2020-07-08 PROCEDURE — 3074F SYST BP LT 130 MM HG: CPT | Mod: HCNC,CPTII,S$GLB, | Performed by: INTERNAL MEDICINE

## 2020-07-08 PROCEDURE — 99499 RISK ADDL DX/OHS AUDIT: ICD-10-PCS | Mod: HCNC,S$GLB,, | Performed by: INTERNAL MEDICINE

## 2020-07-08 PROCEDURE — 1101F PT FALLS ASSESS-DOCD LE1/YR: CPT | Mod: HCNC,CPTII,S$GLB, | Performed by: INTERNAL MEDICINE

## 2020-07-08 NOTE — PATIENT INSTRUCTIONS
Apply Hydraferra Blue Ready to wound base itself and cover with mepore dressing to wound  Change dressings every 3 days or sooner if soiled or wet  Do not get wound dressings wet, if wet remove soiled dressings and apply new dressings  Observe for signs and symptoms of infection and report symptoms to clinic

## 2020-07-08 NOTE — PROGRESS NOTES
CARDIOVASCULAR CONSULTATION    REASON FOR CONSULT:   Papito Bhakta is a 65 y.o. male who presents for nonischemic cardiomyopathy.      HISTORY OF PRESENT ILLNESS:     Patient is a 64-year-old man who used to follow with .  He has a history of nonischemic cardiomyopathy status post Bi V AICD implantation.  For nonischemic cardiomyopathy.  His last known ejection fraction is 10%.  Patient states that he has been doing fine.  Denies any chest pains at rest on exertion, orthopnea, PND, shortness of breath at rest or dyspnea on exertion.  States can go up or down 3 to flight flights of stairs without any problem.  Does have chronic pedal edema in his right foot which has been there for many years.  Denies any worsening of pedal edema.       ECHO:  1-19  · Limited study to assess function  · Severely decreased left ventricular systolic function. The estimated ejection fraction is 10%  · Left ventricular diastolic dysfunction.  · Severely reduced right ventricular systolic function.  · No thrombus     Right/left heart catheterization:  2-18  B. Summary/Post-Operative Diagnosis       Normal coronary arteries.    Systolic dysfunction.    Low right and left Filling Pressures.    Mild Pulmonary Hypertension.     Notes from January 2020:  Patient here for follow-up.  Bi V AICD checked today.  Doing fine.  Had 1 episode of 1 sec long nonsustained ventricular tachycardia.  No other episodes of atrial tachycardias noted.  No other ventricular tachycardias noted apart from that 1 episode.  Patient doing fine.  Denies any chest pains at rest on exertion, orthopnea, PND, swelling of feet.      Notes from February 2020:  Patient here for follow-up.  Denies any chest pains at rest on exertion, orthopnea, PND, swelling of feet.  Tolerating the higher dose of Coreg well.    Notes from July 2020:  Patient here for follow-up.  Since his last visit with me his ICD was explanted because it got infected.  The patient states that  it eroded through his skin.  Was explanted by Dr. Kwong at the [facility].  Denies any chest pains at rest on exertion, orthopnea, PND.  Wearing LifeVest while he is waiting for implantation of a Bi V ICD.    PAST MEDICAL HISTORY:     Past Medical History:   Diagnosis Date    Anticoagulant long-term use     Aspirin    CHF (congestive heart failure)     Hyperlipidemia     Hypertension     NICM (nonischemic cardiomyopathy) 6/16/2020    Obesity        PAST SURGICAL HISTORY:     Past Surgical History:   Procedure Laterality Date    INSERTION OF BIVENTRICULAR IMPLANTABLE CARDIOVERTER-DEFIBRILLATOR (ICD) N/A 2/13/2019    Procedure: INSERTION, ICD, BIVENTRICULAR;  Surgeon: Shailesh Eng MD;  Location: [facility] CATH LAB;  Service: Cardiology;  Laterality: N/A;  RN PRE OP 2-6-19  1ST CASE PER  RAD[facility]. NOTIFIED RADHA THAT ANESTHESIA IS NOT PITO FOR 1ST CASE START-LO    oral extraction  11/2018    TESTICLE SURGERY         ALLERGIES AND MEDICATION:   Review of patient's allergies indicates:  No Known Allergies     Medication List          Accurate as of July 8, 2020  9:13 AM. If you have any questions, ask your nurse or doctor.            CONTINUE taking these medications    aspirin 325 MG tablet     carvediloL 12.5 MG tablet  Commonly known as: COREG  Take 1 tablet (12.5 mg total) by mouth 2 (two) times daily.     diclofenac sodium 1 % Gel  Commonly known as: VOLTAREN  Apply 2 g topically 4 (four) times daily.     doxycycline 100 MG Cap  Commonly known as: VIBRAMYCIN  Take 1 capsule (100 mg total) by mouth every 12 (twelve) hours.     furosemide 80 MG tablet  Commonly known as: LASIX  Take 1 tablet (80 mg total) by mouth once daily.     gabapentin 100 MG capsule  Commonly known as: NEURONTIN  Take 1 capsule (100 mg total) by mouth 3 (three) times daily.     lidocaine 5 %  Commonly known as: LIDODERM  Place 1 patch onto the skin once daily. Remove & Discard patch within 12 hours or as directed by MD     pravastatin 80  MG tablet  Commonly known as: PRAVACHOL  Take 1 tablet (80 mg total) by mouth once daily.     spironolactone 25 MG tablet  Commonly known as: ALDACTONE  Take 1 tablet (25 mg total) by mouth once daily.        STOP taking these medications    ramipriL 10 MG capsule  Commonly known as: ALTACE  Stopped by: Makayla Long MD            SOCIAL HISTORY:     Social History     Socioeconomic History    Marital status:      Spouse name: Not on file    Number of children: Not on file    Years of education: Not on file    Highest education level: Not on file   Occupational History    Not on file   Social Needs    Financial resource strain: Not on file    Food insecurity     Worry: Not on file     Inability: Not on file    Transportation needs     Medical: Not on file     Non-medical: Not on file   Tobacco Use    Smoking status: Former Smoker     Packs/day: 0.50     Years: 40.00     Pack years: 20.00     Types: Cigarettes, Cigars     Quit date:      Years since quittin.5    Smokeless tobacco: Never Used   Substance and Sexual Activity    Alcohol use: Yes     Comment: once a month    Drug use: No    Sexual activity: Yes     Birth control/protection: None     Comment: uses protection sometimes   Lifestyle    Physical activity     Days per week: Not on file     Minutes per session: Not on file    Stress: Not on file   Relationships    Social connections     Talks on phone: Not on file     Gets together: Not on file     Attends [employer] service: Not on file     Active member of club or organization: Not on file     Attends meetings of clubs or organizations: Not on file     Relationship status: Not on file   Other Topics Concern    Not on file   Social History Narrative    Not on file       FAMILY HISTORY:     Family History   Problem Relation Age of Onset    Epilepsy Mother        REVIEW OF SYSTEMS:   Review of Systems   Constitution: Negative.   HENT: Negative.    Eyes: Negative.     Cardiovascular: Positive for leg swelling.   Respiratory: Negative.    Endocrine: Negative.    Hematologic/Lymphatic: Negative.    Skin: Negative.    Musculoskeletal: Negative.    Gastrointestinal: Negative.    Genitourinary: Negative.    Neurological: Negative.    Psychiatric/Behavioral: Negative.    Allergic/Immunologic: Negative.        A 10 point review of systems was performed and all the pertinent positives have been mentioned. Rest of review of systems was negative.        PHYSICAL EXAM:     Vitals:    07/08/20 0834   BP: 124/80   Pulse: (!) 55   Resp: 16    Body mass index is 40.26 kg/m².  Weight: (!) 154 kg (339 lb 8.1 oz)         Physical Exam   Constitutional: He is oriented to person, place, and time. He appears well-developed and well-nourished.   HENT:   Head: Normocephalic.   Eyes: Pupils are equal, round, and reactive to light. Conjunctivae are normal.   Neck: Normal range of motion. Neck supple.   Cardiovascular: Normal rate, regular rhythm and normal heart sounds.   Pulmonary/Chest: Effort normal and breath sounds normal.   Abdominal: Soft. Bowel sounds are normal.   Neurological: He is alert and oriented to person, place, and time.   Skin: Skin is warm.   Vitals reviewed.        DATA:     Laboratory:  CBC:  Recent Labs   Lab 06/18/20  0420 06/19/20  0454 06/20/20  0312   WBC 6.86 6.49 6.08   Hemoglobin 11.8 L 10.5 L 10.6 L   Hematocrit 39.0 L 35.5 L 35.7 L   Platelets 133 L 109 L 122 L       CHEMISTRIES:  Recent Labs   Lab 06/18/20  0420 06/19/20  0454 06/20/20  0312 07/03/20  1055   Glucose 88 133 H 127 H 94   Sodium 138 137 136 141   Potassium 4.7 3.8 4.1 3.8   BUN, Bld 16 16 18 16   Creatinine 1.3 1.1 1.0 1.1   eGFR if African American >60.0 >60.0 >60.0 >60   eGFR if non  57.3 A >60.0 >60.0 >60   Calcium 8.7 8.3 L 8.6 L 9.1   Magnesium 1.9 1.8 2.0  --        CARDIAC BIOMARKERS:  Recent Labs   Lab 01/05/19  0837 01/05/19  1432 01/05/19  2113   Troponin I 0.045 H 0.059 H 0.084 H        COAGS:  Recent Labs   Lab 02/06/19  1530 06/16/20  0745 06/17/20  0443   INR 1.1 1.1 1.1       LIPIDS/LFTS:  Recent Labs   Lab 12/02/17  0823  06/18/20  0420 06/19/20  0454 06/20/20  0312   Cholesterol 160  --   --   --   --    Triglycerides 106  --   --   --   --    HDL 46  --   --   --   --    LDL Cholesterol 92.8  --   --   --   --    Non-HDL Cholesterol 114  --   --   --   --    AST 17   < > 13 12 12   ALT 11   < > 6 L 7 L 6 L    < > = values in this interval not displayed.       No results found for: HGBA1C    TSH        The 10-year ASCVD risk score (Shaila ANN Jr., et al., 2013) is: 14.6%    Values used to calculate the score:      Age: 65 years      Sex: Male      Is Non- : Yes      Diabetic: No      Tobacco smoker: No      Systolic Blood Pressure: 124 mmHg      Is BP treated: Yes      HDL Cholesterol: 46 mg/dL      Total Cholesterol: 160 mg/dL           Cardiovascular Testing:    Personally reviewed as above      ASSESSMENT AND PLAN     Patient Active Problem List   Diagnosis    Other hyperlipidemia    Essential hypertension    Chronic combined systolic and diastolic congestive heart failure    Hyperlipidemia    Severe obesity (BMI 35.0-39.9) with comorbidity    Biventricular ICD (implantable cardioverter-defibrillator) in place    Chronic left shoulder pain    Decreased range of motion of left shoulder    Decreased strength of upper extremity    Congestive heart failure    Infection of pacemaker pocket    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  Nonischemic cardiomyopathy with ejection fraction of 10%.  Patient on Coreg and ramipril and spironolactone.    Continue spironolactone, Coreg and ramipril.  Patient is currently euvolemic on current dose of Lasix.  Continue current therapy.    2.   AICD:  Patient status post Bi V AICD placement.  This was recently explanted because infection.  Is awaiting implantation of a Bi V ICD on the right side by Dr. Kwong at the [facility].   LifeVest in the meanwhile.    3.  Continue aggressive risk factor modification low-salt diet has been recommended.        Thank you very much for involving me in the care of your patient.  Please do not hesitate to contact me if there are any questions.      Makayla Long MD, FACC, [facility]  Interventional Cardiologist, [facility].       Follow-up in 3 months     This note was dictated with the help of speech recognition software.  There might be un-intended errors and/or substitutions.

## 2020-07-08 NOTE — PROGRESS NOTES
Subjective:      Patient ID: Papito Kirkland is a 65 y.o. male.    Chief Complaint:Wound Check      History of Present Illness    Mr. Kirkland is a 65 year male presents with open wound to left chest wall from wound dehiscence secondary to infection of pacemaker.  His pacemaker eroded through the skin and had it removed by Dr. Kwong on 6/17/2020.  He was treated with 2 weeks of IV ABX of Vancomycin and ceftriaxone which was completed on 7/2/2020.  Now on doxycycline per ID.  He has a Life Vest with plan to replace device later in July.  He has edema to BLE, R>L and improves with elevation.  He is sedentary.  Denies fever, chills, pain, erythema, warmth, drainage. Wound care has been dry dressing.  His lives with his daughter who will do the dressing changes.       Review of Systems   Constitution: Positive for malaise/fatigue. Negative for chills, fever, night sweats, weight gain and weight loss.   HENT: Negative for congestion, hoarse voice, sore throat and tinnitus.    Eyes: Negative for blurred vision and visual disturbance.   Cardiovascular: Positive for leg swelling. Negative for chest pain and palpitations.   Respiratory: Negative for cough, shortness of breath and wheezing.    Skin: Negative for dry skin, itching and rash.   Musculoskeletal: Negative for back pain, joint pain, myalgias and neck pain.   Gastrointestinal: Negative for abdominal pain, constipation, diarrhea, heartburn, nausea and vomiting.   Neurological: Negative for dizziness, headaches, numbness, paresthesias and weakness.   Psychiatric/Behavioral: Negative for depression and memory loss. The patient is not nervous/anxious.    All other systems reviewed and are negative.    Objective:   Physical Exam  Constitutional:       General: He is not in acute distress.     Appearance: Normal appearance. He is well-developed. He is not diaphoretic.   HENT:      Head: Normocephalic and atraumatic.      Nose: Nose normal.   Eyes:      Pupils: Pupils are  equal, round, and reactive to light.   Cardiovascular:      Rate and Rhythm: Normal rate and regular rhythm.   Pulmonary:      Effort: Pulmonary effort is normal. No respiratory distress.      Breath sounds: Normal breath sounds.   Musculoskeletal:      Right lower leg: Edema present.   Skin:     General: Skin is warm and dry.   Neurological:      Mental Status: He is alert and oriented to person, place, and time.   Psychiatric:         Behavior: Behavior normal.         Thought Content: Thought content normal.                Wound 07/08/20 1519  Left upper Chest #1 (Active)   07/08/20 1519    Pre-existing: Yes   Primary Wound Type:    Side: Left   Orientation: upper   Location: Chest   Wound Number (optional): #1   Ankle-Brachial Index:    Pulses:    Removal Indication and Assessment:    Wound Outcome:    (Retired) Wound Type:    (Retired) Wound Length (cm):    (Retired) Wound Width (cm):    (Retired) Depth (cm):    Wound Description (Comments):    Removal Indications:    Wound Image   07/08/20 1519   Dressing Appearance Dry;Intact 07/08/20 1519   Drainage Amount Scant 07/08/20 1519   Drainage Characteristics/Odor Serosanguineous 07/08/20 1519   Appearance Red;Foreign body;Slough 07/08/20 1519   Red (%), Wound Tissue Color 50 % 07/08/20 1519   Yellow (%), Wound Tissue Color 50 % 07/08/20 1519   Periwound Area Intact;Scar tissue 07/08/20 1519   Wound Edges Defined 07/08/20 1519   Wound Length (cm) 1.8 cm 07/08/20 1519   Wound Width (cm) 0.5 cm 07/08/20 1519   Wound Depth (cm) 0.1 cm 07/08/20 1519   Wound Volume (cm^3) 0.09 cm^3 07/08/20 1519   Wound Surface Area (cm^2) 0.9 cm^2 07/08/20 1519   Care Wound cleanser 07/08/20 1519   Dressing Applied;Methylene blue/gentian violet;Island/border 07/08/20 1519           Assessment:       1. Open wound of left chest wall, initial encounter    2. Wound dehiscence    3. Erosion of pacemaker pocket due to and not concurrent with implantation of cardiac pacemaker             Plan:      Wound cleaned with wound cleanser  Wound debrided per procedure note with suture material removed  Continue antibiotics as prescribed  Hydraferra Blue Ready and mepore dressing to wound, change dressings every 3 days or if soiled or wet  Do not get wound dressings wet, if wet remove soiled dressings and apply new dressings  Observe for signs and symptoms of infection and report symptoms to clinic  RTC 2 weeks after he sees ID

## 2020-07-09 ENCOUNTER — DOCUMENT SCAN (OUTPATIENT)
Dept: HOME HEALTH SERVICES | Facility: HOSPITAL | Age: 65
End: 2020-07-09
Payer: MEDICARE

## 2020-07-16 ENCOUNTER — EXTERNAL HOME HEALTH (OUTPATIENT)
Dept: HOME HEALTH SERVICES | Facility: HOSPITAL | Age: 65
End: 2020-07-16
Payer: MEDICARE

## 2020-07-20 NOTE — PROGRESS NOTES
Subjective:      Patient ID: Papito Kirkland is a 65 y.o. male.    Chief Complaint:Wound Check      History of Present Illness    Mr. Kirkland is a 65 year male presents with open wound to left chest wall from wound dehiscence secondary to infection of pacemaker.  His pacemaker eroded through the skin and had it removed by Dr. Kwong on 6/17/2020.  He was treated with 2 weeks of IV ABX of Vancomycin and ceftriaxone which was completed on 7/2/2020.  Now on doxycycline per ID and had appointment 7/22/2020 with ID.  He has a Life Vest with plan to replace device later in July.  He has edema to BLE, R>L and improves with elevation.  He is sedentary.  Denies fever, chills, pain, erythema, warmth, drainage. Wound care is Hydrofera Blue Ready and mepore dressing to wound .  His lives with his daughter who does the dressing changes.       Review of Systems   Constitution: Negative for chills, diaphoresis, fever and malaise/fatigue.   Eyes: Negative for blurred vision and visual disturbance.   Cardiovascular: Negative for chest pain, leg swelling and palpitations.   Respiratory: Negative for cough, shortness of breath and wheezing.    Skin: Negative for dry skin, itching and rash.        wound   Musculoskeletal: Negative for back pain, joint pain, myalgias and neck pain.   Gastrointestinal: Negative for nausea and vomiting.   Neurological: Negative for dizziness, headaches, numbness, paresthesias and weakness.   Psychiatric/Behavioral: Negative for depression and memory loss. The patient is not nervous/anxious.    All other systems reviewed and are negative.    Objective:   Physical Exam  Constitutional:       General: He is not in acute distress.     Appearance: Normal appearance. He is well-developed. He is not diaphoretic.   HENT:      Head: Normocephalic and atraumatic.      Nose: Nose normal.   Eyes:      Pupils: Pupils are equal, round, and reactive to light.   Neck:      Musculoskeletal: Normal range of motion.    Cardiovascular:      Rate and Rhythm: Normal rate and regular rhythm.      Pulses: Normal pulses.   Pulmonary:      Effort: Pulmonary effort is normal. No respiratory distress.      Breath sounds: Normal breath sounds.   Skin:     General: Skin is warm and dry.   Neurological:      Mental Status: He is alert and oriented to person, place, and time.   Psychiatric:         Behavior: Behavior normal.         Thought Content: Thought content normal.                Wound 07/08/20 1519  Left upper Chest #1 (Active)   07/08/20 1519    Pre-existing: Yes   Primary Wound Type:    Side: Left   Orientation: upper   Location: Chest   Wound Number (optional): #1   Ankle-Brachial Index:    Pulses:    Removal Indication and Assessment:    Wound Outcome:    (Retired) Wound Type:    (Retired) Wound Length (cm):    (Retired) Wound Width (cm):    (Retired) Depth (cm):    Wound Description (Comments):    Removal Indications:    Wound Image   07/22/20 1038   Dressing Appearance Dry;Intact;Clean 07/22/20 1038   Drainage Amount None 07/22/20 1038   Appearance Pink 07/22/20 1038   Red (%), Wound Tissue Color 100 % 07/22/20 1038   Periwound Area Intact 07/22/20 1038   Wound Edges Defined 07/22/20 1038   Wound Length (cm) 0.3 cm 07/22/20 1038   Wound Width (cm) 1.2 cm 07/22/20 1038   Wound Depth (cm) 0.1 cm 07/22/20 1038   Wound Volume (cm^3) 0.04 cm^3 07/22/20 1038   Wound Surface Area (cm^2) 0.36 cm^2 07/22/20 1038   Care Wound cleanser 07/22/20 1038   Dressing Applied;Collagen;Island/border 07/22/20 1038           Assessment:       1. Open wound of left chest wall, initial encounter    2. Wound dehiscence    3. Erosion of pacemaker pocket due to and not concurrent with implantation of cardiac pacemaker            Plan:      Wound cleaned with wound cleanser  Apply Prism to wound base and cover with dry dressing, change every 3 days  Do not get wound dressings wet, if wet remove soiled dressings and apply new dressings  Observe for signs  and symptoms of infection and report symptoms to clinic

## 2020-07-22 ENCOUNTER — OFFICE VISIT (OUTPATIENT)
Dept: WOUND CARE | Facility: CLINIC | Age: 65
End: 2020-07-22
Payer: MEDICARE

## 2020-07-22 ENCOUNTER — OFFICE VISIT (OUTPATIENT)
Dept: INFECTIOUS DISEASES | Facility: CLINIC | Age: 65
End: 2020-07-22
Payer: MEDICARE

## 2020-07-22 ENCOUNTER — CLINICAL SUPPORT (OUTPATIENT)
Dept: INFECTIOUS DISEASES | Facility: CLINIC | Age: 65
End: 2020-07-22
Payer: MEDICARE

## 2020-07-22 VITALS
DIASTOLIC BLOOD PRESSURE: 64 MMHG | WEIGHT: 315 LBS | TEMPERATURE: 99 F | BODY MASS INDEX: 37.19 KG/M2 | HEART RATE: 50 BPM | SYSTOLIC BLOOD PRESSURE: 106 MMHG | HEIGHT: 77 IN

## 2020-07-22 VITALS
HEIGHT: 77 IN | TEMPERATURE: 99 F | BODY MASS INDEX: 37.19 KG/M2 | WEIGHT: 315 LBS | HEART RATE: 50 BPM | DIASTOLIC BLOOD PRESSURE: 64 MMHG | SYSTOLIC BLOOD PRESSURE: 106 MMHG

## 2020-07-22 DIAGNOSIS — T82.897S EROSION OF PACEMAKER POCKET DUE TO AND NOT CONCURRENT WITH IMPLANTATION OF CARDIAC PACEMAKER: ICD-10-CM

## 2020-07-22 DIAGNOSIS — T82.7XXD INFECTION OF PACEMAKER POCKET, SUBSEQUENT ENCOUNTER: ICD-10-CM

## 2020-07-22 DIAGNOSIS — S21.102A OPEN WOUND OF LEFT CHEST WALL, INITIAL ENCOUNTER: Primary | ICD-10-CM

## 2020-07-22 DIAGNOSIS — T81.30XA WOUND DEHISCENCE: ICD-10-CM

## 2020-07-22 DIAGNOSIS — Z23 NEED FOR STREPTOCOCCUS PNEUMONIAE VACCINATION: ICD-10-CM

## 2020-07-22 DIAGNOSIS — T82.7XXD: Primary | ICD-10-CM

## 2020-07-22 PROCEDURE — 3074F PR MOST RECENT SYSTOLIC BLOOD PRESSURE < 130 MM HG: ICD-10-PCS | Mod: HCNC,CPTII,S$GLB, | Performed by: PHYSICIAN ASSISTANT

## 2020-07-22 PROCEDURE — 3008F PR BODY MASS INDEX (BMI) DOCUMENTED: ICD-10-PCS | Mod: HCNC,CPTII,S$GLB, | Performed by: NURSE PRACTITIONER

## 2020-07-22 PROCEDURE — 99213 OFFICE O/P EST LOW 20 MIN: CPT | Mod: HCNC,S$GLB,, | Performed by: NURSE PRACTITIONER

## 2020-07-22 PROCEDURE — 99213 PR OFFICE/OUTPT VISIT, EST, LEVL III, 20-29 MIN: ICD-10-PCS | Mod: HCNC,S$GLB,, | Performed by: PHYSICIAN ASSISTANT

## 2020-07-22 PROCEDURE — 3008F PR BODY MASS INDEX (BMI) DOCUMENTED: ICD-10-PCS | Mod: HCNC,CPTII,S$GLB, | Performed by: PHYSICIAN ASSISTANT

## 2020-07-22 PROCEDURE — 3078F PR MOST RECENT DIASTOLIC BLOOD PRESSURE < 80 MM HG: ICD-10-PCS | Mod: HCNC,CPTII,S$GLB, | Performed by: NURSE PRACTITIONER

## 2020-07-22 PROCEDURE — 99213 OFFICE O/P EST LOW 20 MIN: CPT | Mod: HCNC,S$GLB,, | Performed by: PHYSICIAN ASSISTANT

## 2020-07-22 PROCEDURE — 99213 PR OFFICE/OUTPT VISIT, EST, LEVL III, 20-29 MIN: ICD-10-PCS | Mod: HCNC,S$GLB,, | Performed by: NURSE PRACTITIONER

## 2020-07-22 PROCEDURE — 3074F SYST BP LT 130 MM HG: CPT | Mod: HCNC,CPTII,S$GLB, | Performed by: PHYSICIAN ASSISTANT

## 2020-07-22 PROCEDURE — 99999 PR PBB SHADOW E&M-EST. PATIENT-LVL V: ICD-10-PCS | Mod: PBBFAC,HCNC,, | Performed by: NURSE PRACTITIONER

## 2020-07-22 PROCEDURE — 1101F PT FALLS ASSESS-DOCD LE1/YR: CPT | Mod: HCNC,CPTII,S$GLB, | Performed by: NURSE PRACTITIONER

## 2020-07-22 PROCEDURE — 3078F DIAST BP <80 MM HG: CPT | Mod: HCNC,CPTII,S$GLB, | Performed by: PHYSICIAN ASSISTANT

## 2020-07-22 PROCEDURE — 99999 PR PBB SHADOW E&M-EST. PATIENT-LVL IV: ICD-10-PCS | Mod: PBBFAC,HCNC,, | Performed by: PHYSICIAN ASSISTANT

## 2020-07-22 PROCEDURE — 1101F PT FALLS ASSESS-DOCD LE1/YR: CPT | Mod: HCNC,CPTII,S$GLB, | Performed by: PHYSICIAN ASSISTANT

## 2020-07-22 PROCEDURE — 99999 PR PBB SHADOW E&M-EST. PATIENT-LVL V: CPT | Mod: PBBFAC,HCNC,, | Performed by: NURSE PRACTITIONER

## 2020-07-22 PROCEDURE — G0009 ADMIN PNEUMOCOCCAL VACCINE: HCPCS | Mod: HCNC,S$GLB,, | Performed by: INTERNAL MEDICINE

## 2020-07-22 PROCEDURE — 3078F DIAST BP <80 MM HG: CPT | Mod: HCNC,CPTII,S$GLB, | Performed by: NURSE PRACTITIONER

## 2020-07-22 PROCEDURE — 3008F BODY MASS INDEX DOCD: CPT | Mod: HCNC,CPTII,S$GLB, | Performed by: NURSE PRACTITIONER

## 2020-07-22 PROCEDURE — 3074F SYST BP LT 130 MM HG: CPT | Mod: HCNC,CPTII,S$GLB, | Performed by: NURSE PRACTITIONER

## 2020-07-22 PROCEDURE — 3008F BODY MASS INDEX DOCD: CPT | Mod: HCNC,CPTII,S$GLB, | Performed by: PHYSICIAN ASSISTANT

## 2020-07-22 PROCEDURE — 3078F PR MOST RECENT DIASTOLIC BLOOD PRESSURE < 80 MM HG: ICD-10-PCS | Mod: HCNC,CPTII,S$GLB, | Performed by: PHYSICIAN ASSISTANT

## 2020-07-22 PROCEDURE — 1101F PR PT FALLS ASSESS DOC 0-1 FALLS W/OUT INJ PAST YR: ICD-10-PCS | Mod: HCNC,CPTII,S$GLB, | Performed by: PHYSICIAN ASSISTANT

## 2020-07-22 PROCEDURE — 1101F PR PT FALLS ASSESS DOC 0-1 FALLS W/OUT INJ PAST YR: ICD-10-PCS | Mod: HCNC,CPTII,S$GLB, | Performed by: NURSE PRACTITIONER

## 2020-07-22 PROCEDURE — 90670 PCV13 VACCINE IM: CPT | Mod: HCNC,S$GLB,, | Performed by: INTERNAL MEDICINE

## 2020-07-22 PROCEDURE — 3074F PR MOST RECENT SYSTOLIC BLOOD PRESSURE < 130 MM HG: ICD-10-PCS | Mod: HCNC,CPTII,S$GLB, | Performed by: NURSE PRACTITIONER

## 2020-07-22 PROCEDURE — G0009 PNEUMOCOCCAL CONJUGATE VACCINE 13-VALENT LESS THAN 5YO & GREATER THAN: ICD-10-PCS | Mod: HCNC,S$GLB,, | Performed by: INTERNAL MEDICINE

## 2020-07-22 PROCEDURE — 99999 PR PBB SHADOW E&M-EST. PATIENT-LVL IV: CPT | Mod: PBBFAC,HCNC,, | Performed by: PHYSICIAN ASSISTANT

## 2020-07-22 PROCEDURE — 90670 PNEUMOCOCCAL CONJUGATE VACCINE 13-VALENT LESS THAN 5YO & GREATER THAN: ICD-10-PCS | Mod: HCNC,S$GLB,, | Performed by: INTERNAL MEDICINE

## 2020-07-22 NOTE — PROGRESS NOTES
Subjective:      Patient ID: Papito Kirkland is a 65 y.o. male.    Chief Complaint:Follow-up      History of Present Illness    Mr. Kirkland is a 65 year male who who was recently seen as inpt as his PPM had eroded through the skin.  It was removed with Dr. Kwong on 6/17/20.  Blood cultures and lead tips cultured negative.  No pocket cultures done and per prior convo with EP, no purulence seen, only necrosis.  Intraop JASSON was negative for vegetations.  He was discharged on Vancomycin and ceftriaxone empirically to complete 2 weeks.  His abx were recently extended as CRP was still a little elevated.  He was due to end on 7/2.  He is currently on a Life Vest.  Dr. Kwong is unable to replace device till later in July.  He is seen at this time.    Patient says he has been doing OK and tolerating the abx without event.  He has been retaining mor fluid and has had some increased SOB.  He has chronic LE edema R>L and it has worsened particularly in the RLE and it has become painful.  They improve with elevation.  He has been sedentary. He denies wound or PICC problems.  The patient denies any recent fever, chills, or sweats.      Review of Systems   Constitution: Positive for malaise/fatigue. Negative for chills, decreased appetite, fever, night sweats, weight gain and weight loss.   HENT: Negative for congestion, ear pain, hearing loss, hoarse voice, sore throat and tinnitus.    Eyes: Negative for blurred vision, redness and visual disturbance.   Cardiovascular: Positive for leg swelling. Negative for chest pain and palpitations.   Respiratory: Negative for cough, hemoptysis, shortness of breath, sputum production and wheezing.    Endocrine: Negative for cold intolerance and heat intolerance.   Hematologic/Lymphatic: Negative for adenopathy. Does not bruise/bleed easily.   Skin: Negative for dry skin, itching, rash and suspicious lesions.   Musculoskeletal: Negative for back pain, joint pain, myalgias and neck pain.    Gastrointestinal: Negative for abdominal pain, constipation, diarrhea, heartburn, nausea and vomiting.   Genitourinary: Negative for dysuria, flank pain, frequency, hematuria, hesitancy and urgency.   Neurological: Negative for dizziness, headaches, numbness, paresthesias and weakness.   Psychiatric/Behavioral: Negative for depression and memory loss. The patient does not have insomnia and is not nervous/anxious.    Allergic/Immunologic: Negative for environmental allergies, HIV exposure, hives and persistent infections.     Objective:   Physical Exam  Constitutional:       General: He is not in acute distress.     Appearance: He is well-developed. He is not diaphoretic.       HENT:      Head: Normocephalic and atraumatic.   Cardiovascular:      Rate and Rhythm: Normal rate and regular rhythm.      Heart sounds: Normal heart sounds. No murmur. No friction rub. No gallop.    Pulmonary:      Effort: Pulmonary effort is normal. No respiratory distress.      Breath sounds: Normal breath sounds. No wheezing or rales.   Abdominal:      General: Bowel sounds are normal. There is no distension.      Palpations: Abdomen is soft. There is no mass.      Tenderness: There is no abdominal tenderness. There is no guarding or rebound.   Skin:     General: Skin is warm and dry.   Neurological:      Mental Status: He is alert and oriented to person, place, and time.   Psychiatric:         Behavior: Behavior normal.           Left chest wound        Assessment:       No diagnosis found.    No active sign of infection about wound - suspect possible post op hematoma underlying - completed > 2 weeks of empiric IV abx for pocket infection as lead cx and JASSON negative.  Concern that wound may dehisce  RLE pain and edema CF poor fluid management but DVT is considered given pain, tenderness and has been more sedentary    Plan:   1. Stop IV abx and remove CVC  2. Start doxycycline 100mg po bid given wound slightly dehisced x 2 weeks  3. RLE  US to check for DVT  4. INstructed patient to increase vitamin c and zinc for connective tissue.support  5. FU on 7/8 with cardiologist for fluid management  6. Referred to wound care for LUE wound management  7. OK to replace PPM on opposite side  8. FU 2 weeks with me in clinic to check on wound.

## 2020-07-22 NOTE — PATIENT INSTRUCTIONS
Clean wounds with mild soap and water and pat dry  Apply Prism to wound base and cover with dry dressing, change every 3 days  Do not get wound dressings wet, if wet remove soiled dressings and apply new dressings  Observe for signs and symptoms of infection and report symptoms to clinic

## 2020-07-22 NOTE — PROGRESS NOTES
Subjective:      Patient ID: Papito Kirkland is a 65 y.o. male.    Chief Complaint:Follow-up      History of Present Illness  Mr. Kirkland is a 65 year male who who was recently seen as inpt as his PPM had eroded through the skin.  It was removed with Dr. Kwong on 6/17/20.  Blood cultures and lead tips cultured negative.  No pocket cultures done and per prior convo with EP, no purulence seen, only necrosis.  Intraop JASSON was negative for vegetations.  He was discharged on Vancomycin and ceftriaxone empirically to complete 2 weeks.  His abx were recently extended as CRP was still a little elevated.  He was due to end on 7/2.  He is currently on a Life Vest.  Dr. Kwong is unable to replace device till later in July.  He is seen at this time.    Patient says he has been doing well since his last visit earlier this month.  He was sent to wound care and has been treated with Hydrafera Blue to wound and he says it has healed well.  He has been to see his cardiologist as well.  Leg edema about the same as prior. He had a LLE US fo possible DVT at last visit which was negative.  He is tolerating the Doxycycline without event. The patient denies any recent fever, chills, or sweats.    Review of Systems   Constitution: Negative for chills, decreased appetite, fever, malaise/fatigue, night sweats, weight gain and weight loss.   HENT: Negative for congestion, ear pain, hearing loss, hoarse voice, sore throat and tinnitus.    Eyes: Negative for blurred vision, redness and visual disturbance.   Cardiovascular: Positive for leg swelling (chronic). Negative for chest pain and palpitations.   Respiratory: Negative for cough, hemoptysis, shortness of breath, sputum production and wheezing.    Endocrine: Negative for cold intolerance and heat intolerance.   Hematologic/Lymphatic: Negative for adenopathy. Does not bruise/bleed easily.   Skin: Negative for dry skin, itching, rash and suspicious lesions.        KALYAN chest wound    Musculoskeletal: Negative for back pain, joint pain, myalgias and neck pain.   Gastrointestinal: Negative for abdominal pain, constipation, diarrhea, heartburn, nausea and vomiting.   Genitourinary: Negative for dysuria, flank pain, frequency, hematuria, hesitancy and urgency.   Neurological: Negative for dizziness, headaches, numbness, paresthesias and weakness.   Psychiatric/Behavioral: Negative for depression and memory loss. The patient does not have insomnia and is not nervous/anxious.    Allergic/Immunologic: Negative for environmental allergies, HIV exposure, hives and persistent infections.     Objective:   Physical Exam  Constitutional:       General: He is not in acute distress.     Appearance: He is well-developed. He is not diaphoretic.       HENT:      Head: Normocephalic and atraumatic.   Cardiovascular:      Rate and Rhythm: Normal rate and regular rhythm.      Heart sounds: Normal heart sounds. No murmur. No friction rub. No gallop.    Pulmonary:      Effort: Pulmonary effort is normal. No respiratory distress.      Breath sounds: Normal breath sounds. No wheezing or rales.   Abdominal:      General: Bowel sounds are normal. There is no distension.      Palpations: Abdomen is soft. There is no mass.      Tenderness: There is no abdominal tenderness. There is no guarding or rebound.   Skin:     General: Skin is warm and dry.   Neurological:      Mental Status: He is alert and oriented to person, place, and time.   Psychiatric:         Behavior: Behavior normal.     7/22/20 7/6/20      Imaging:  Reading physician: Igor Vizcarra MD Ordering physician: YASH Sloan Jr. Study date: 7/6/20   Conclusion    · No right LE DVT.  · Subcutaneous edema is present.      Performing Clinician    Cortney Louis   Vitals    Height Weight BMI (Calculated) BSA (Calculated - sq m) BP           Reason for Exam  Priority: Routine  CF DVT   Dx: Leg edema, right [R60.0 (ICD-10-CM)]   Comments:     Reviewed By    YASH Sloan Jr. on 7/6/2020 19:47      Result Image Hyperlink     Show images for CV Ultrasound doppler venous DVT leg right   Order-Level Epiphany Scans:    There are no order-level epiphany scans.  Findings    Right Lower Venous Right external iliac vein is normal.  Right common femoral vein is normal.   Right deep femoral vein is normal.   Right superficial femoral proximal vein is normal.   Right superficial femoral middle vein is normal.  Right superficial femoral distal vein is normal.  Right popliteal vein is is normal.  Right anterior tibial vein is normal.  Right posterior tibial vein is normal.  Right peroneal vein is normal.  Right saphenofemoral junction is is normal.  There is a subcutaneous edema located in the distal thigh, proximal calf and distal calf veins         Assessment:       1. Infection of biventricular implantable cardioverter-defibrillator (ICD), subsequent encounter    2. Infection of pacemaker pocket, subsequent encounter    3. Need for Streptococcus pneumoniae vaccination        Doing well without signs of infection.  KALYAN chest wound healing and about to complete doxycycline - no signs of infection - suspect needs wound care only  Leg edema - stable chronic  Plan:   1. Complete Doxy and continue localized wound care  2. Notify ID for any signs sx of infection - reviewed with patient and family.  Business card provided  3.  OK to reimplant PPM on opposite side per EP.  4.  FU prn

## 2020-07-23 ENCOUNTER — IMMUNIZATION (OUTPATIENT)
Dept: PHARMACY | Facility: CLINIC | Age: 65
End: 2020-07-23
Payer: MEDICARE

## 2020-08-03 NOTE — PROGRESS NOTES
Subjective:      Patient ID: Papito Kirkland is a 65 y.o. male.    Chief Complaint:Wound Check      History of Present Illness    Mr. Kirkland is a 65 year male presents with open wound to left chest wall from wound dehiscence secondary to infection of pacemaker.  His pacemaker eroded through the skin and had it removed by Dr. Kwong on 6/17/2020.  He was treated with antibiotics per ID.  He has a Life Vest with plan to replace device later in July.  He has edema to BLE, R>L and improves with elevation.  He is sedentary.  Denies fever, chills, pain, erythema, warmth, drainage. Wound care is Asya and mepore dressing to wound .  His lives with his daughter who does the dressing changes.      Review of Systems   Constitution: Negative for chills, diaphoresis, fever and malaise/fatigue.   Eyes: Negative for blurred vision and visual disturbance.   Cardiovascular: Negative for chest pain, leg swelling and palpitations.   Respiratory: Negative for cough, shortness of breath and wheezing.    Skin: Negative for dry skin, itching and rash.        wound   Musculoskeletal: Negative for back pain, joint pain, myalgias and neck pain.   Gastrointestinal: Negative for nausea and vomiting.   Neurological: Negative for dizziness, headaches, numbness, paresthesias and weakness.   Psychiatric/Behavioral: Negative for depression and memory loss. The patient is not nervous/anxious.    All other systems reviewed and are negative.    Objective:   Physical Exam  Constitutional:       General: He is not in acute distress.     Appearance: Normal appearance. He is well-developed. He is not diaphoretic.   HENT:      Head: Normocephalic and atraumatic.      Nose: Nose normal.   Eyes:      Pupils: Pupils are equal, round, and reactive to light.   Neck:      Musculoskeletal: Normal range of motion.   Cardiovascular:      Rate and Rhythm: Normal rate and regular rhythm.      Pulses: Normal pulses.   Pulmonary:      Effort: Pulmonary effort is  normal. No respiratory distress.      Breath sounds: Normal breath sounds.   Skin:     General: Skin is warm and dry.      Comments: Wound is almost resolved, has small opening.  No s/s of infection.    Neurological:      Mental Status: He is alert and oriented to person, place, and time.   Psychiatric:         Behavior: Behavior normal.         Thought Content: Thought content normal.                Wound 07/08/20 1519  Left upper Chest #1 (Active)   07/08/20 1519    Pre-existing: Yes   Primary Wound Type:    Side: Left   Orientation: upper   Location: Chest   Wound Number (optional): #1   Ankle-Brachial Index:    Pulses:    Removal Indication and Assessment:    Wound Outcome:    (Retired) Wound Type:    (Retired) Wound Length (cm):    (Retired) Wound Width (cm):    (Retired) Depth (cm):    Wound Description (Comments):    Removal Indications:    Wound Image   08/05/20 1049   Dressing Appearance Dry;Intact 08/05/20 1049   Drainage Amount None 08/05/20 1049   Appearance Pink;Epithelialization 08/05/20 1049   Periwound Area Intact 08/05/20 1049   Wound Edges Defined 08/05/20 1049   Wound Length (cm) 0.1 cm 08/05/20 1049   Wound Width (cm) 0.2 cm 08/05/20 1049   Wound Depth (cm) 0.1 cm 08/05/20 1049   Wound Volume (cm^3) 0 cm^3 08/05/20 1049   Wound Surface Area (cm^2) 0.02 cm^2 08/05/20 1049           Assessment:       1. Open wound of left chest wall, initial encounter    2. Wound dehiscence            Plan:      Wound cleaned with wound cleanser  Apply Prism to wound base and cover with dry dressing, change every 3 days  Do not get wound dressings wet, if wet remove soiled dressings and apply new dressings  Observe for signs and symptoms of infection and report symptoms to clinic

## 2020-08-05 ENCOUNTER — OFFICE VISIT (OUTPATIENT)
Dept: WOUND CARE | Facility: CLINIC | Age: 65
End: 2020-08-05
Payer: MEDICARE

## 2020-08-05 VITALS
DIASTOLIC BLOOD PRESSURE: 72 MMHG | HEART RATE: 67 BPM | SYSTOLIC BLOOD PRESSURE: 130 MMHG | WEIGHT: 315 LBS | HEIGHT: 77 IN | TEMPERATURE: 99 F | BODY MASS INDEX: 37.19 KG/M2

## 2020-08-05 DIAGNOSIS — S21.102A OPEN WOUND OF LEFT CHEST WALL, INITIAL ENCOUNTER: Primary | ICD-10-CM

## 2020-08-05 DIAGNOSIS — T81.30XA WOUND DEHISCENCE: ICD-10-CM

## 2020-08-05 PROCEDURE — 3078F PR MOST RECENT DIASTOLIC BLOOD PRESSURE < 80 MM HG: ICD-10-PCS | Mod: HCNC,CPTII,S$GLB, | Performed by: NURSE PRACTITIONER

## 2020-08-05 PROCEDURE — 1101F PT FALLS ASSESS-DOCD LE1/YR: CPT | Mod: HCNC,CPTII,S$GLB, | Performed by: NURSE PRACTITIONER

## 2020-08-05 PROCEDURE — 3075F PR MOST RECENT SYSTOLIC BLOOD PRESS GE 130-139MM HG: ICD-10-PCS | Mod: HCNC,CPTII,S$GLB, | Performed by: NURSE PRACTITIONER

## 2020-08-05 PROCEDURE — 3008F PR BODY MASS INDEX (BMI) DOCUMENTED: ICD-10-PCS | Mod: HCNC,CPTII,S$GLB, | Performed by: NURSE PRACTITIONER

## 2020-08-05 PROCEDURE — 99213 OFFICE O/P EST LOW 20 MIN: CPT | Mod: HCNC,S$GLB,, | Performed by: NURSE PRACTITIONER

## 2020-08-05 PROCEDURE — 99999 PR PBB SHADOW E&M-EST. PATIENT-LVL IV: CPT | Mod: PBBFAC,HCNC,, | Performed by: NURSE PRACTITIONER

## 2020-08-05 PROCEDURE — 3075F SYST BP GE 130 - 139MM HG: CPT | Mod: HCNC,CPTII,S$GLB, | Performed by: NURSE PRACTITIONER

## 2020-08-05 PROCEDURE — 3078F DIAST BP <80 MM HG: CPT | Mod: HCNC,CPTII,S$GLB, | Performed by: NURSE PRACTITIONER

## 2020-08-05 PROCEDURE — 99999 PR PBB SHADOW E&M-EST. PATIENT-LVL IV: ICD-10-PCS | Mod: PBBFAC,HCNC,, | Performed by: NURSE PRACTITIONER

## 2020-08-05 PROCEDURE — 3008F BODY MASS INDEX DOCD: CPT | Mod: HCNC,CPTII,S$GLB, | Performed by: NURSE PRACTITIONER

## 2020-08-05 PROCEDURE — 1101F PR PT FALLS ASSESS DOC 0-1 FALLS W/OUT INJ PAST YR: ICD-10-PCS | Mod: HCNC,CPTII,S$GLB, | Performed by: NURSE PRACTITIONER

## 2020-08-05 PROCEDURE — 99213 PR OFFICE/OUTPT VISIT, EST, LEVL III, 20-29 MIN: ICD-10-PCS | Mod: HCNC,S$GLB,, | Performed by: NURSE PRACTITIONER

## 2020-08-10 NOTE — PROGRESS NOTES
"Mr. Bhakta is a patient of Dr. Kwong.      Subjective:   Patient ID:  Papito Bhakta is a 65 y.o. male who presents for follow-up of Cardiomyopathy  .     HPI:    Mr. Bhakta is a 65 y.o. male with HTN, HLD, NICM s/p CRT-D (2/13/2019) here for follow up after device extraction.     Background:    Cardiologist: Makayla Long MD.    6/15/2020: Mr. Bhakta is a 65-year-old male with a past medical history significant for hypertension, hyperlipidemia, nonischemic cardiomyopathy status post Bi-V ICD 02/13/2019 [employer] with Dr. Shailesh Eng  EF 10%.  He notes increased drainage from his pocket site over the past 1 week.  Yesterday morning he was performing yard work and noticed pain in his left pectoral region with acute dehiscence of his pocket.     6/17/2020: Successful device extraction. Complex/difficult due to dense adhesions on lead, requiring use of a mechanical cutting sheath. Successful excision of necrotic/infected tissue (5x10x3 cm volume). Complex wound closure.  Plan: ABx per ID consult. Follow up with me in clinic at the end of the antibiotic course. LifeVest until a new right-sided biV ICD can be implanted.     Per EP, new ICD to be placed in mid-July or when cleared by ID (whichever is LATER).    ID appt 7/22/2020: "OK to reimplant PPM on opposite side per EP."    Update (08/12/2020):    Today he says his wound site has healed well. Superficial wound being treated by wound care. Incision is closed with no drainage. He has noticed more leg edema since having his device removed. Chronic KING. Denies CP, LH, syncope. In wheelchair.    I have personally reviewed the patient's EKG today, which shows SB with IVCD at 56bpm. OK interval is 204. QRS is 202. QTc is 528.    Recent Cardiac Tests:    2D Echo (6/2020):  · Severely decreased left ventricular systolic function. The estimated ejection fraction is 25%. Note: this is an increase from prior to his device implantation (was 10%)  · Low normal right " ventricular systolic function.  · Normal appearing left atrial appendage. No thrombus is present in the appendage.  · RA and RV ICD leads visualized in the posterior SVC along the vessel wall before extraction.  · S/p Successful device and lead extraction. No pericardial effusion before or after procedure. No worsening TR post procedure. Normal SVC anatomy post procedure. Wire seen in SVC post extraction is an amplatzer wire.  · PISA radius 0.8 cm, EROA 41 mm2, RVOL 53mL, Systolic flow reversal in Left superior pulmonary vein. Severe mitral regurgitation by PISA.  · Moderate tricuspid regurgitation.  · Mild to moderate pulmonic regurgitation.  · Pulmonary hypertension present.     Current Outpatient Medications   Medication Sig    aspirin 325 MG tablet Take 325 mg by mouth once daily.    carvediloL (COREG) 12.5 MG tablet Take 1 tablet (12.5 mg total) by mouth 2 (two) times daily.    diclofenac sodium (VOLTAREN) 1 % Gel Apply 2 g topically 4 (four) times daily.    doxycycline (VIBRAMYCIN) 100 MG Cap Take 1 capsule (100 mg total) by mouth every 12 (twelve) hours.    furosemide (LASIX) 80 MG tablet Take 1 tablet (80 mg total) by mouth once daily.    gabapentin (NEURONTIN) 100 MG capsule Take 1 capsule (100 mg total) by mouth 3 (three) times daily.    pravastatin (PRAVACHOL) 80 MG tablet Take 1 tablet (80 mg total) by mouth once daily.    spironolactone (ALDACTONE) 25 MG tablet Take 1 tablet (25 mg total) by mouth once daily.    lidocaine (LIDODERM) 5 % Place 1 patch onto the skin once daily. Remove & Discard patch within 12 hours or as directed by MD (Patient not taking: Reported on 8/12/2020)    varicella-zoster [id number], PF, (SHINGRIX, PF,) 50 mcg/0.5 mL injection Inject into the muscle.     No current facility-administered medications for this visit.      Review of Systems   Constitution: Negative for malaise/fatigue.   Cardiovascular: Positive for dyspnea on exertion and leg swelling. Negative for chest  "pain, irregular heartbeat and palpitations.   Respiratory: Negative for shortness of breath.    Hematologic/Lymphatic: Negative for bleeding problem.   Skin: Negative for rash.   Musculoskeletal: Negative for myalgias.   Gastrointestinal: Negative for hematemesis, hematochezia and nausea.   Genitourinary: Negative for hematuria.   Neurological: Negative for light-headedness.   Psychiatric/Behavioral: Negative for altered mental status.   Allergic/Immunologic: Negative for persistent infections.     Objective:        /74   Pulse (!) 56   Ht 6' 5" (1.956 m)   Wt (!) 154.9 kg (341 lb 7.9 oz)   BMI 40.50 kg/m²     Physical Exam   Constitutional: He is oriented to person, place, and time. He appears well-developed and well-nourished.   HENT:   Head: Normocephalic.   Nose: Nose normal.   Eyes: Pupils are equal, round, and reactive to light.   Cardiovascular: Regular rhythm, S1 normal and S2 normal. Bradycardia present.   No murmur heard.  Pulses:       Radial pulses are 2+ on the right side and 2+ on the left side.   Pulmonary/Chest: Breath sounds normal. No respiratory distress.   Incision without s/s infection - see media   Abdominal: Normal appearance.   Musculoskeletal: Normal range of motion.         General: No edema.   Neurological: He is alert and oriented to person, place, and time.   Skin: Skin is warm and dry. No erythema.   Psychiatric: He has a normal mood and affect. His speech is normal and behavior is normal.   Nursing note and vitals reviewed.            Lab Results   Component Value Date     07/03/2020    K 3.8 07/03/2020    MG 2.0 06/20/2020    BUN 16 07/03/2020    CREATININE 1.1 07/03/2020    ALT 6 (L) 06/20/2020    AST 12 06/20/2020    HGB 10.6 (L) 06/20/2020    HCT 35.7 (L) 06/20/2020    HCT 36 06/17/2020    LDLCALC 92.8 12/02/2017       Recent Labs   Lab 01/05/19  0837 02/06/19  1530 06/16/20  0745 06/17/20  0443   INR 1.1 1.1 1.1 1.1       Assessment:     1. Biventricular ICD " (implantable cardioverter-defibrillator) in place    2. NICM (nonischemic cardiomyopathy)    3. Essential hypertension      Plan:     In summary, Mr. Bhakta is a 65 y.o. male with HTN, HLD, NICM s/p CRT-D (2/13/2019) here for follow up after device extraction.   He is 2 months s/p extraction of CRT-D due to infection. He is now wearing a Life Vest. Echo 6/2020 showed EF of 25% (vs pre-CRT EF of 10%).   Plan per chart is to proceed with right-sided CRT-D in Mid-July if approved by ID. Per ID note, patient is cleared to proceed.    Right-sided CRT-D  Continue Life Vest until procedure  RTC as scheduled following procedure    *A copy of this note has been sent to Dr. Kwong*    Follow up as scheduled following procedure.    ------------------------------------------------------------------    SERINA Riggs, NP-C  Cardiac Electrophysiology

## 2020-08-12 ENCOUNTER — OFFICE VISIT (OUTPATIENT)
Dept: ELECTROPHYSIOLOGY | Facility: CLINIC | Age: 65
End: 2020-08-12
Payer: MEDICARE

## 2020-08-12 ENCOUNTER — HOSPITAL ENCOUNTER (OUTPATIENT)
Dept: CARDIOLOGY | Facility: CLINIC | Age: 65
Discharge: HOME | End: 2020-08-12
Payer: MEDICARE

## 2020-08-12 VITALS
BODY MASS INDEX: 37.19 KG/M2 | HEIGHT: 77 IN | HEART RATE: 56 BPM | DIASTOLIC BLOOD PRESSURE: 74 MMHG | WEIGHT: 315 LBS | SYSTOLIC BLOOD PRESSURE: 118 MMHG

## 2020-08-12 DIAGNOSIS — I10 ESSENTIAL HYPERTENSION: Chronic | ICD-10-CM

## 2020-08-12 DIAGNOSIS — I50.42 CHRONIC COMBINED SYSTOLIC AND DIASTOLIC CONGESTIVE HEART FAILURE: ICD-10-CM

## 2020-08-12 DIAGNOSIS — Z95.810 BIVENTRICULAR ICD (IMPLANTABLE CARDIOVERTER-DEFIBRILLATOR) IN PLACE: Primary | ICD-10-CM

## 2020-08-12 DIAGNOSIS — I50.9 CONGESTIVE HEART FAILURE, UNSPECIFIED HF CHRONICITY, UNSPECIFIED HEART FAILURE TYPE: ICD-10-CM

## 2020-08-12 DIAGNOSIS — I42.8 NICM (NONISCHEMIC CARDIOMYOPATHY): Chronic | ICD-10-CM

## 2020-08-12 DIAGNOSIS — I42.8 NICM (NONISCHEMIC CARDIOMYOPATHY): ICD-10-CM

## 2020-08-12 PROCEDURE — 99999 PR PBB SHADOW E&M-EST. PATIENT-LVL IV: ICD-10-PCS | Mod: PBBFAC,HCNC,, | Performed by: NURSE PRACTITIONER

## 2020-08-12 PROCEDURE — 99214 PR OFFICE/OUTPT VISIT, EST, LEVL IV, 30-39 MIN: ICD-10-PCS | Mod: HCNC,S$GLB,, | Performed by: NURSE PRACTITIONER

## 2020-08-12 PROCEDURE — 99214 OFFICE O/P EST MOD 30 MIN: CPT | Mod: HCNC,S$GLB,, | Performed by: NURSE PRACTITIONER

## 2020-08-12 PROCEDURE — 93010 ELECTROCARDIOGRAM REPORT: CPT | Mod: HCNC,S$GLB,, | Performed by: INTERNAL MEDICINE

## 2020-08-12 PROCEDURE — 93005 RHYTHM STRIP: ICD-10-PCS | Mod: HCNC,S$GLB,, | Performed by: INTERNAL MEDICINE

## 2020-08-12 PROCEDURE — 3008F BODY MASS INDEX DOCD: CPT | Mod: HCNC,CPTII,S$GLB, | Performed by: NURSE PRACTITIONER

## 2020-08-12 PROCEDURE — 93005 ELECTROCARDIOGRAM TRACING: CPT | Mod: HCNC,S$GLB,, | Performed by: INTERNAL MEDICINE

## 2020-08-12 PROCEDURE — 93010 RHYTHM STRIP: ICD-10-PCS | Mod: HCNC,S$GLB,, | Performed by: INTERNAL MEDICINE

## 2020-08-12 PROCEDURE — 3074F PR MOST RECENT SYSTOLIC BLOOD PRESSURE < 130 MM HG: ICD-10-PCS | Mod: HCNC,CPTII,S$GLB, | Performed by: NURSE PRACTITIONER

## 2020-08-12 PROCEDURE — 3074F SYST BP LT 130 MM HG: CPT | Mod: HCNC,CPTII,S$GLB, | Performed by: NURSE PRACTITIONER

## 2020-08-12 PROCEDURE — 99999 PR PBB SHADOW E&M-EST. PATIENT-LVL IV: CPT | Mod: PBBFAC,HCNC,, | Performed by: NURSE PRACTITIONER

## 2020-08-12 PROCEDURE — 3078F DIAST BP <80 MM HG: CPT | Mod: HCNC,CPTII,S$GLB, | Performed by: NURSE PRACTITIONER

## 2020-08-12 PROCEDURE — 3078F PR MOST RECENT DIASTOLIC BLOOD PRESSURE < 80 MM HG: ICD-10-PCS | Mod: HCNC,CPTII,S$GLB, | Performed by: NURSE PRACTITIONER

## 2020-08-12 PROCEDURE — 1101F PT FALLS ASSESS-DOCD LE1/YR: CPT | Mod: HCNC,CPTII,S$GLB, | Performed by: NURSE PRACTITIONER

## 2020-08-12 PROCEDURE — 1101F PR PT FALLS ASSESS DOC 0-1 FALLS W/OUT INJ PAST YR: ICD-10-PCS | Mod: HCNC,CPTII,S$GLB, | Performed by: NURSE PRACTITIONER

## 2020-08-12 PROCEDURE — 3008F PR BODY MASS INDEX (BMI) DOCUMENTED: ICD-10-PCS | Mod: HCNC,CPTII,S$GLB, | Performed by: NURSE PRACTITIONER

## 2020-08-25 ENCOUNTER — TELEPHONE (OUTPATIENT)
Dept: ELECTROPHYSIOLOGY | Facility: CLINIC | Age: 65
End: 2020-08-25

## 2020-08-25 NOTE — TELEPHONE ENCOUNTER
----- Message from Chelo Manuel MA sent at 8/25/2020  8:06 AM CDT -----  Farrah please call the patient daughter Annie she would like to talk you about  a procedure  [phone] . Thank you.

## 2020-08-27 ENCOUNTER — TELEPHONE (OUTPATIENT)
Dept: ELECTROPHYSIOLOGY | Facility: CLINIC | Age: 65
End: 2020-08-27

## 2020-08-27 DIAGNOSIS — I50.42 CHRONIC COMBINED SYSTOLIC AND DIASTOLIC CONGESTIVE HEART FAILURE: ICD-10-CM

## 2020-08-27 DIAGNOSIS — I42.8 NICM (NONISCHEMIC CARDIOMYOPATHY): Primary | ICD-10-CM

## 2020-08-27 NOTE — TELEPHONE ENCOUNTER
----- Message from Nazia Montgomery MA sent at 8/27/2020 11:31 AM CDT -----  Regarding: FW: PA for life vest  Contact: patient's daughter [phone]  Please advise     Thank you,   Nazia Montgomery MA  [facility]     ----- Message -----  From: Lucretia Shafer  Sent: 8/27/2020  10:31 AM CDT  To: Elenita SEBASTIAN Staff  Subject: PA for life vest                                 The pt's daughter is calling to get a PA ([phone]) for a life vest. Please call her back @ [phone]. Thanks, Lucretia

## 2020-09-25 ENCOUNTER — TELEPHONE (OUTPATIENT)
Dept: ELECTROPHYSIOLOGY | Facility: CLINIC | Age: 65
End: 2020-09-25

## 2020-09-25 ENCOUNTER — LAB VISIT (OUTPATIENT)
Dept: LAB | Facility: HOSPITAL | Age: 65
End: 2020-09-25
Attending: INTERNAL MEDICINE
Payer: MEDICARE

## 2020-09-25 ENCOUNTER — LAB VISIT (OUTPATIENT)
Dept: SURGERY | Facility: CLINIC | Age: 65
End: 2020-09-25
Payer: MEDICARE

## 2020-09-25 DIAGNOSIS — I42.8 NICM (NONISCHEMIC CARDIOMYOPATHY): ICD-10-CM

## 2020-09-25 DIAGNOSIS — I50.42 CHRONIC COMBINED SYSTOLIC AND DIASTOLIC CONGESTIVE HEART FAILURE: ICD-10-CM

## 2020-09-25 LAB
ANION GAP SERPL CALC-SCNC: 8 MMOL/L (ref 8–16)
APTT BLDCRRT: 30.7 SEC (ref 21–32)
BASOPHILS # BLD AUTO: 0.05 K/UL (ref 0–0.2)
BASOPHILS NFR BLD: 1 % (ref 0–1.9)
BUN SERPL-MCNC: 16 MG/DL (ref 8–23)
CALCIUM SERPL-MCNC: 8.9 MG/DL (ref 8.7–10.5)
CHLORIDE SERPL-SCNC: 102 MMOL/L (ref 95–110)
CO2 SERPL-SCNC: 30 MMOL/L (ref 23–29)
CREAT SERPL-MCNC: 1.3 MG/DL (ref 0.5–1.4)
DIFFERENTIAL METHOD: ABNORMAL
EOSINOPHIL # BLD AUTO: 0.1 K/UL (ref 0–0.5)
EOSINOPHIL NFR BLD: 2.5 % (ref 0–8)
ERYTHROCYTE [DISTWIDTH] IN BLOOD BY AUTOMATED COUNT: 15.8 % (ref 11.5–14.5)
EST. GFR  (AFRICAN AMERICAN): >60 ML/MIN/1.73 M^2
EST. GFR  (NON AFRICAN AMERICAN): 57.3 ML/MIN/1.73 M^2
GLUCOSE SERPL-MCNC: 101 MG/DL (ref 70–110)
HCT VFR BLD AUTO: 38.6 % (ref 40–54)
HGB BLD-MCNC: 11.2 G/DL (ref 14–18)
IMM GRANULOCYTES # BLD AUTO: 0.02 K/UL (ref 0–0.04)
IMM GRANULOCYTES NFR BLD AUTO: 0.4 % (ref 0–0.5)
INR PPP: 1.2 (ref 0.8–1.2)
LYMPHOCYTES # BLD AUTO: 1.6 K/UL (ref 1–4.8)
LYMPHOCYTES NFR BLD: 30.1 % (ref 18–48)
MCH RBC QN AUTO: 23.5 PG (ref 27–31)
MCHC RBC AUTO-ENTMCNC: 29 G/DL (ref 32–36)
MCV RBC AUTO: 81 FL (ref 82–98)
MONOCYTES # BLD AUTO: 0.8 K/UL (ref 0.3–1)
MONOCYTES NFR BLD: 15.4 % (ref 4–15)
NEUTROPHILS # BLD AUTO: 2.7 K/UL (ref 1.8–7.7)
NEUTROPHILS NFR BLD: 50.6 % (ref 38–73)
NRBC BLD-RTO: 0 /100 WBC
PLATELET # BLD AUTO: 229 K/UL (ref 150–350)
PMV BLD AUTO: 10.8 FL (ref 9.2–12.9)
POTASSIUM SERPL-SCNC: 3.9 MMOL/L (ref 3.5–5.1)
PROTHROMBIN TIME: 13 SEC (ref 9–12.5)
RBC # BLD AUTO: 4.76 M/UL (ref 4.6–6.2)
SARS-COV-2 RNA RESP QL NAA+PROBE: NOT DETECTED
SODIUM SERPL-SCNC: 140 MMOL/L (ref 136–145)
WBC # BLD AUTO: 5.25 K/UL (ref 3.9–12.7)

## 2020-09-25 PROCEDURE — 85730 THROMBOPLASTIN TIME PARTIAL: CPT | Mod: HCNC

## 2020-09-25 PROCEDURE — 80048 BASIC METABOLIC PNL TOTAL CA: CPT | Mod: HCNC

## 2020-09-25 PROCEDURE — 85025 COMPLETE CBC W/AUTO DIFF WBC: CPT | Mod: HCNC

## 2020-09-25 PROCEDURE — U0003 INFECTIOUS AGENT DETECTION BY NUCLEIC ACID (DNA OR RNA); SEVERE ACUTE RESPIRATORY SYNDROME CORONAVIRUS 2 (SARS-COV-2) (CORONAVIRUS DISEASE [COVID-19]), AMPLIFIED PROBE TECHNIQUE, MAKING USE OF HIGH THROUGHPUT TECHNOLOGIES AS DESCRIBED BY CMS-2020-01-R: HCPCS | Mod: HCNC

## 2020-09-25 PROCEDURE — 36415 COLL VENOUS BLD VENIPUNCTURE: CPT | Mod: HCNC

## 2020-09-25 PROCEDURE — 85610 PROTHROMBIN TIME: CPT | Mod: HCNC

## 2020-09-25 NOTE — TELEPHONE ENCOUNTER
Attempted to contact Mr. Bhakta to confirm procedure details for Monday. No answer. Left detailed voicemail including: arrival time change, NPO after midnight, medication instructions, as well as callback number.     Labs done, no alerts    Covid pending

## 2020-09-25 NOTE — TELEPHONE ENCOUNTER
Returned call to pt. Spoke with daughter who wanted to know if results of covid test were back. Advised her it would take a couple days. Reviewed pre op instructions with her including: arrival time of 12 noon, NPO after MN, okay to take morning meds. She voiced understanding.          ----- Message from Lucretia Shafer sent at 9/25/2020  1:46 PM CDT -----  Regarding: returning a call  Contact: patient  The pt is returning a call. Please call him back [phone]. Thanks, Lucretia

## 2020-09-28 ENCOUNTER — IMMUNIZATION (OUTPATIENT)
Dept: PHARMACY | Facility: CLINIC | Age: 65
End: 2020-09-28
Payer: MEDICARE

## 2020-09-28 ENCOUNTER — HOSPITAL ENCOUNTER (INPATIENT)
Facility: HOSPITAL | Age: 65
LOS: 15 days | Discharge: NURSING, REHAB OR LONG-TERM CARE | DRG: 226 | End: 2020-10-15
Attending: INTERNAL MEDICINE | Admitting: INTERNAL MEDICINE
Payer: MEDICARE

## 2020-09-28 ENCOUNTER — ANESTHESIA (OUTPATIENT)
Dept: MEDSURG UNIT | Facility: HOSPITAL | Age: 65
DRG: 226 | End: 2020-09-28
Payer: MEDICARE

## 2020-09-28 ENCOUNTER — ANESTHESIA EVENT (OUTPATIENT)
Dept: MEDSURG UNIT | Facility: HOSPITAL | Age: 65
DRG: 226 | End: 2020-09-28
Payer: MEDICARE

## 2020-09-28 DIAGNOSIS — I50.9 CHF (CONGESTIVE HEART FAILURE): ICD-10-CM

## 2020-09-28 DIAGNOSIS — E78.49 OTHER HYPERLIPIDEMIA: ICD-10-CM

## 2020-09-28 DIAGNOSIS — R31.9 HEMATURIA, UNSPECIFIED TYPE: ICD-10-CM

## 2020-09-28 DIAGNOSIS — I50.43 ACUTE ON CHRONIC COMBINED SYSTOLIC AND DIASTOLIC HEART FAILURE: ICD-10-CM

## 2020-09-28 DIAGNOSIS — Z95.9 CARDIAC DEVICE IN SITU: ICD-10-CM

## 2020-09-28 DIAGNOSIS — N17.9 AKI (ACUTE KIDNEY INJURY): ICD-10-CM

## 2020-09-28 DIAGNOSIS — I50.42 CHRONIC COMBINED SYSTOLIC AND DIASTOLIC CONGESTIVE HEART FAILURE: ICD-10-CM

## 2020-09-28 DIAGNOSIS — J96.01 ACUTE HYPOXEMIC RESPIRATORY FAILURE: ICD-10-CM

## 2020-09-28 DIAGNOSIS — A41.9 SEVERE SEPSIS: ICD-10-CM

## 2020-09-28 DIAGNOSIS — R06.02 SOB (SHORTNESS OF BREATH): ICD-10-CM

## 2020-09-28 DIAGNOSIS — I50.9 CONGESTIVE HEART DISEASE: ICD-10-CM

## 2020-09-28 DIAGNOSIS — I42.8 NICM (NONISCHEMIC CARDIOMYOPATHY): ICD-10-CM

## 2020-09-28 DIAGNOSIS — R65.20 SEVERE SEPSIS: ICD-10-CM

## 2020-09-28 LAB
ALLENS TEST: ABNORMAL
ALLENS TEST: ABNORMAL
DELSYS: ABNORMAL
DELSYS: ABNORMAL
EP: 5
EP: 8
ERYTHROCYTE [SEDIMENTATION RATE] IN BLOOD BY WESTERGREN METHOD: 10 MM/H
ERYTHROCYTE [SEDIMENTATION RATE] IN BLOOD BY WESTERGREN METHOD: 12 MM/H
FIO2: 40
FIO2: 40
GLUCOSE SERPL-MCNC: 97 MG/DL (ref 70–110)
HCO3 UR-SCNC: 30.5 MMOL/L (ref 24–28)
HCO3 UR-SCNC: 31 MMOL/L (ref 24–28)
HCO3 UR-SCNC: 32.6 MMOL/L (ref 24–28)
HCT VFR BLD CALC: 37 %PCV (ref 36–54)
IP: 12
IP: 18
MIN VOL: 15
MODE: ABNORMAL
MODE: ABNORMAL
PCO2 BLDA: 63.8 MMHG (ref 35–45)
PCO2 BLDA: 65 MMHG (ref 35–45)
PCO2 BLDA: 67.9 MMHG (ref 35–45)
PH SMN: 7.27 [PH] (ref 7.35–7.45)
PH SMN: 7.28 [PH] (ref 7.35–7.45)
PH SMN: 7.32 [PH] (ref 7.35–7.45)
PO2 BLDA: 102 MMHG (ref 80–100)
PO2 BLDA: 19 MMHG (ref 40–60)
PO2 BLDA: 87 MMHG (ref 80–100)
POC BE: 4 MMOL/L
POC BE: 4 MMOL/L
POC BE: 6 MMOL/L
POC IONIZED CALCIUM: 1.22 MMOL/L (ref 1.06–1.42)
POC SATURATED O2: 25 % (ref 95–100)
POC SATURATED O2: 95 % (ref 95–100)
POC SATURATED O2: 97 % (ref 95–100)
POC TCO2: 32 MMOL/L (ref 23–27)
POC TCO2: 33 MMOL/L (ref 23–27)
POC TCO2: 35 MMOL/L (ref 24–29)
POCT GLUCOSE: 97 MG/DL (ref 70–110)
POTASSIUM BLD-SCNC: 3.7 MMOL/L (ref 3.5–5.1)
SAMPLE: ABNORMAL
SITE: ABNORMAL
SITE: ABNORMAL
SODIUM BLD-SCNC: 142 MMOL/L (ref 136–145)
SP02: 97
SPONT RATE: 18
SPONT RATE: 33

## 2020-09-28 PROCEDURE — 36600 WITHDRAWAL OF ARTERIAL BLOOD: CPT | Mod: HCNC

## 2020-09-28 PROCEDURE — 25000003 PHARM REV CODE 250: Mod: HCNC

## 2020-09-28 PROCEDURE — 25500020 PHARM REV CODE 255: Mod: HCNC | Performed by: INTERNAL MEDICINE

## 2020-09-28 PROCEDURE — 94660 CPAP INITIATION&MGMT: CPT | Mod: HCNC

## 2020-09-28 PROCEDURE — D9220A PRA ANESTHESIA: Mod: HCNC,CRNA,, | Performed by: NURSE ANESTHETIST, CERTIFIED REGISTERED

## 2020-09-28 PROCEDURE — 33225 PR INSRT PACING ELECT,AT INSERT NEW DEVICE: ICD-10-PCS | Mod: HCNC,,, | Performed by: INTERNAL MEDICINE

## 2020-09-28 PROCEDURE — 93010 ELECTROCARDIOGRAM REPORT: CPT | Mod: HCNC,,, | Performed by: INTERNAL MEDICINE

## 2020-09-28 PROCEDURE — D9220A PRA ANESTHESIA: Mod: HCNC,ANES,, | Performed by: ANESTHESIOLOGY

## 2020-09-28 PROCEDURE — 33249 INSJ/RPLCMT DEFIB W/LEAD(S): CPT | Mod: HCNC,22,, | Performed by: INTERNAL MEDICINE

## 2020-09-28 PROCEDURE — C2628 CATHETER, OCCLUSION: HCPCS | Mod: HCNC | Performed by: INTERNAL MEDICINE

## 2020-09-28 PROCEDURE — A4216 STERILE WATER/SALINE, 10 ML: HCPCS | Mod: HCNC | Performed by: NURSE ANESTHETIST, CERTIFIED REGISTERED

## 2020-09-28 PROCEDURE — 93010 EKG 12-LEAD: ICD-10-PCS | Mod: HCNC,,, | Performed by: INTERNAL MEDICINE

## 2020-09-28 PROCEDURE — C1898 LEAD, PMKR, OTHER THAN TRANS: HCPCS | Mod: HCNC | Performed by: INTERNAL MEDICINE

## 2020-09-28 PROCEDURE — 99220 PR INITIAL OBSERVATION CARE,LEVL III: CPT | Mod: HCNC,,, | Performed by: INTERNAL MEDICINE

## 2020-09-28 PROCEDURE — 25000003 PHARM REV CODE 250: Mod: HCNC | Performed by: INTERNAL MEDICINE

## 2020-09-28 PROCEDURE — 33249 INSJ/RPLCMT DEFIB W/LEAD(S): CPT | Mod: HCNC | Performed by: INTERNAL MEDICINE

## 2020-09-28 PROCEDURE — 27000190 HC CPAP FULL FACE MASK W/VALVE: Mod: HCNC

## 2020-09-28 PROCEDURE — C1900 LEAD, CORONARY VENOUS: HCPCS | Mod: HCNC | Performed by: INTERNAL MEDICINE

## 2020-09-28 PROCEDURE — A4216 STERILE WATER/SALINE, 10 ML: HCPCS | Mod: HCNC | Performed by: INTERNAL MEDICINE

## 2020-09-28 PROCEDURE — 99900035 HC TECH TIME PER 15 MIN (STAT): Mod: HCNC

## 2020-09-28 PROCEDURE — D9220A PRA ANESTHESIA: ICD-10-PCS | Mod: HCNC,CRNA,, | Performed by: NURSE ANESTHETIST, CERTIFIED REGISTERED

## 2020-09-28 PROCEDURE — 37000008 HC ANESTHESIA 1ST 15 MINUTES: Mod: HCNC | Performed by: INTERNAL MEDICINE

## 2020-09-28 PROCEDURE — C1777 LEAD, AICD, ENDO SINGLE COIL: HCPCS | Mod: HCNC | Performed by: INTERNAL MEDICINE

## 2020-09-28 PROCEDURE — 37000009 HC ANESTHESIA EA ADD 15 MINS: Mod: HCNC | Performed by: INTERNAL MEDICINE

## 2020-09-28 PROCEDURE — 25000003 PHARM REV CODE 250: Mod: HCNC | Performed by: NURSE ANESTHETIST, CERTIFIED REGISTERED

## 2020-09-28 PROCEDURE — 27000221 HC OXYGEN, UP TO 24 HOURS: Mod: HCNC

## 2020-09-28 PROCEDURE — 63600175 PHARM REV CODE 636 W HCPCS: Mod: HCNC | Performed by: INTERNAL MEDICINE

## 2020-09-28 PROCEDURE — C1882 AICD, OTHER THAN SING/DUAL: HCPCS | Mod: HCNC | Performed by: INTERNAL MEDICINE

## 2020-09-28 PROCEDURE — 27201423 OPTIME MED/SURG SUP & DEVICES STERILE SUPPLY: Mod: HCNC | Performed by: INTERNAL MEDICINE

## 2020-09-28 PROCEDURE — 33249 PR ICD INSERT SNGL/DUAL W/LEADS: ICD-10-PCS | Mod: HCNC,22,, | Performed by: INTERNAL MEDICINE

## 2020-09-28 PROCEDURE — 93005 ELECTROCARDIOGRAM TRACING: CPT | Mod: HCNC

## 2020-09-28 PROCEDURE — D9220A PRA ANESTHESIA: ICD-10-PCS | Mod: HCNC,ANES,, | Performed by: ANESTHESIOLOGY

## 2020-09-28 PROCEDURE — 82803 BLOOD GASES ANY COMBINATION: CPT | Mod: HCNC

## 2020-09-28 PROCEDURE — 63600175 PHARM REV CODE 636 W HCPCS: Mod: HCNC

## 2020-09-28 PROCEDURE — 33225 L VENTRIC PACING LEAD ADD-ON: CPT | Mod: HCNC,,, | Performed by: INTERNAL MEDICINE

## 2020-09-28 PROCEDURE — 82962 GLUCOSE BLOOD TEST: CPT | Mod: HCNC | Performed by: INTERNAL MEDICINE

## 2020-09-28 PROCEDURE — 63600175 PHARM REV CODE 636 W HCPCS: Mod: HCNC | Performed by: NURSE ANESTHETIST, CERTIFIED REGISTERED

## 2020-09-28 PROCEDURE — C1769 GUIDE WIRE: HCPCS | Mod: HCNC | Performed by: INTERNAL MEDICINE

## 2020-09-28 PROCEDURE — 33225 L VENTRIC PACING LEAD ADD-ON: CPT | Mod: HCNC | Performed by: INTERNAL MEDICINE

## 2020-09-28 PROCEDURE — C1894 INTRO/SHEATH, NON-LASER: HCPCS | Mod: HCNC | Performed by: INTERNAL MEDICINE

## 2020-09-28 PROCEDURE — 94761 N-INVAS EAR/PLS OXIMETRY MLT: CPT | Mod: HCNC

## 2020-09-28 PROCEDURE — C1892 INTRO/SHEATH,FIXED,PEEL-AWAY: HCPCS | Mod: HCNC | Performed by: INTERNAL MEDICINE

## 2020-09-28 PROCEDURE — C1893 INTRO/SHEATH, FIXED,NON-PEEL: HCPCS | Mod: HCNC | Performed by: INTERNAL MEDICINE

## 2020-09-28 PROCEDURE — 63600175 PHARM REV CODE 636 W HCPCS: Mod: HCNC | Performed by: NURSE PRACTITIONER

## 2020-09-28 PROCEDURE — 99220 PR INITIAL OBSERVATION CARE,LEVL III: ICD-10-PCS | Mod: HCNC,,, | Performed by: INTERNAL MEDICINE

## 2020-09-28 PROCEDURE — C1730 CATH, EP, 19 OR FEW ELECT: HCPCS | Mod: HCNC | Performed by: INTERNAL MEDICINE

## 2020-09-28 DEVICE — LEFT HEART LEAD
Type: IMPLANTABLE DEVICE | Site: HEART | Status: FUNCTIONAL
Brand: QUARTET™

## 2020-09-28 DEVICE — DEFIBRILLATION LEAD
Type: IMPLANTABLE DEVICE | Site: HEART | Status: FUNCTIONAL
Brand: DURATA™

## 2020-09-28 DEVICE — PACING LEAD
Type: IMPLANTABLE DEVICE | Site: HEART | Status: FUNCTIONAL
Brand: TENDRIL™

## 2020-09-28 DEVICE — CARDIAC RESYNCHRONIZATION DEVICE, TIERED-THERAPY CARDIOVERTER/DEFIBRILLATOR VVED DDDRV
Type: IMPLANTABLE DEVICE | Site: CHEST | Status: FUNCTIONAL
Brand: QUADRA ASSURA MP™

## 2020-09-28 RX ORDER — GABAPENTIN 100 MG/1
100 CAPSULE ORAL 3 TIMES DAILY
Status: DISCONTINUED | OUTPATIENT
Start: 2020-09-28 | End: 2020-10-15 | Stop reason: HOSPADM

## 2020-09-28 RX ORDER — CEFAZOLIN SODIUM 1 G/3ML
2 INJECTION, POWDER, FOR SOLUTION INTRAMUSCULAR; INTRAVENOUS
Status: DISCONTINUED | OUTPATIENT
Start: 2020-09-28 | End: 2020-09-28 | Stop reason: HOSPADM

## 2020-09-28 RX ORDER — PRAVASTATIN SODIUM 40 MG/1
80 TABLET ORAL DAILY
Status: DISCONTINUED | OUTPATIENT
Start: 2020-09-29 | End: 2020-10-15 | Stop reason: HOSPADM

## 2020-09-28 RX ORDER — FUROSEMIDE 10 MG/ML
60 INJECTION INTRAMUSCULAR; INTRAVENOUS ONCE
Status: COMPLETED | OUTPATIENT
Start: 2020-09-28 | End: 2020-09-28

## 2020-09-28 RX ORDER — KETAMINE HCL IN 0.9 % NACL 50 MG/5 ML
SYRINGE (ML) INTRAVENOUS
Status: DISCONTINUED | OUTPATIENT
Start: 2020-09-28 | End: 2020-09-28

## 2020-09-28 RX ORDER — DEXMEDETOMIDINE HYDROCHLORIDE 100 UG/ML
INJECTION, SOLUTION INTRAVENOUS
Status: DISCONTINUED | OUTPATIENT
Start: 2020-09-28 | End: 2020-09-28

## 2020-09-28 RX ORDER — FUROSEMIDE 10 MG/ML
80 INJECTION INTRAMUSCULAR; INTRAVENOUS ONCE
Status: DISCONTINUED | OUTPATIENT
Start: 2020-09-28 | End: 2020-09-28

## 2020-09-28 RX ORDER — SPIRONOLACTONE 25 MG/1
25 TABLET ORAL DAILY
Status: DISCONTINUED | OUTPATIENT
Start: 2020-09-29 | End: 2020-09-29

## 2020-09-28 RX ORDER — LIDOCAINE HYDROCHLORIDE 20 MG/ML
INJECTION, SOLUTION INFILTRATION; PERINEURAL
Status: DISCONTINUED | OUTPATIENT
Start: 2020-09-28 | End: 2020-09-28

## 2020-09-28 RX ORDER — CARVEDILOL 12.5 MG/1
12.5 TABLET ORAL 2 TIMES DAILY
Status: DISCONTINUED | OUTPATIENT
Start: 2020-09-28 | End: 2020-10-01

## 2020-09-28 RX ORDER — CEFAZOLIN SODIUM 1 G/3ML
2 INJECTION, POWDER, FOR SOLUTION INTRAMUSCULAR; INTRAVENOUS
Status: DISCONTINUED | OUTPATIENT
Start: 2020-09-28 | End: 2020-09-29

## 2020-09-28 RX ORDER — SODIUM CHLORIDE 9 MG/ML
INJECTION, SOLUTION INTRAVENOUS CONTINUOUS PRN
Status: DISCONTINUED | OUTPATIENT
Start: 2020-09-28 | End: 2020-09-28

## 2020-09-28 RX ORDER — MIDAZOLAM HYDROCHLORIDE 1 MG/ML
INJECTION, SOLUTION INTRAMUSCULAR; INTRAVENOUS
Status: DISCONTINUED | OUTPATIENT
Start: 2020-09-28 | End: 2020-09-28

## 2020-09-28 RX ORDER — BUPIVACAINE HYDROCHLORIDE 2.5 MG/ML
INJECTION, SOLUTION EPIDURAL; INFILTRATION; INTRACAUDAL
Status: DISCONTINUED | OUTPATIENT
Start: 2020-09-28 | End: 2020-09-28

## 2020-09-28 RX ORDER — SODIUM CHLORIDE 0.9 % (FLUSH) 0.9 %
3 SYRINGE (ML) INJECTION
Status: DISCONTINUED | OUTPATIENT
Start: 2020-09-28 | End: 2020-09-28

## 2020-09-28 RX ORDER — CEPHALEXIN 500 MG/1
500 CAPSULE ORAL EVERY 8 HOURS
Status: COMPLETED | OUTPATIENT
Start: 2020-09-29 | End: 2020-10-03

## 2020-09-28 RX ORDER — SODIUM CHLORIDE 9 MG/ML
INJECTION, SOLUTION INTRAMUSCULAR; INTRAVENOUS; SUBCUTANEOUS
Status: DISCONTINUED | OUTPATIENT
Start: 2020-09-28 | End: 2020-09-28

## 2020-09-28 RX ORDER — FENTANYL CITRATE 50 UG/ML
INJECTION, SOLUTION INTRAMUSCULAR; INTRAVENOUS
Status: DISCONTINUED | OUTPATIENT
Start: 2020-09-28 | End: 2020-09-28

## 2020-09-28 RX ORDER — IODIXANOL 320 MG/ML
INJECTION, SOLUTION INTRAVASCULAR
Status: DISCONTINUED | OUTPATIENT
Start: 2020-09-28 | End: 2020-09-28

## 2020-09-28 RX ORDER — VANCOMYCIN HYDROCHLORIDE 1 G/20ML
INJECTION, POWDER, LYOPHILIZED, FOR SOLUTION INTRAVENOUS
Status: DISCONTINUED | OUTPATIENT
Start: 2020-09-28 | End: 2020-09-28

## 2020-09-28 RX ORDER — VASOPRESSIN 20 [USP'U]/ML
INJECTION, SOLUTION INTRAMUSCULAR; SUBCUTANEOUS
Status: DISCONTINUED | OUTPATIENT
Start: 2020-09-28 | End: 2020-09-28

## 2020-09-28 RX ORDER — ACETAMINOPHEN 325 MG/1
650 TABLET ORAL EVERY 4 HOURS PRN
Status: DISCONTINUED | OUTPATIENT
Start: 2020-09-28 | End: 2020-10-15 | Stop reason: HOSPADM

## 2020-09-28 RX ADMIN — VASOPRESSIN 1 UNITS: 20 INJECTION, SOLUTION INTRAMUSCULAR; SUBCUTANEOUS at 04:09

## 2020-09-28 RX ADMIN — Medication 5 MG: at 02:09

## 2020-09-28 RX ADMIN — FENTANYL CITRATE 25 MCG: 50 INJECTION INTRAMUSCULAR; INTRAVENOUS at 01:09

## 2020-09-28 RX ADMIN — DEXMEDETOMIDINE HYDROCHLORIDE 8 MCG: 100 INJECTION, SOLUTION, CONCENTRATE INTRAVENOUS at 02:09

## 2020-09-28 RX ADMIN — Medication 10 MG: at 02:09

## 2020-09-28 RX ADMIN — CEFAZOLIN 2 G: 1 INJECTION, POWDER, FOR SOLUTION INTRAMUSCULAR; INTRAVENOUS at 07:09

## 2020-09-28 RX ADMIN — SODIUM CHLORIDE: 0.9 INJECTION, SOLUTION INTRAVENOUS at 01:09

## 2020-09-28 RX ADMIN — FENTANYL CITRATE 25 MCG: 50 INJECTION INTRAMUSCULAR; INTRAVENOUS at 02:09

## 2020-09-28 RX ADMIN — MIDAZOLAM 1 MG: 1 INJECTION INTRAMUSCULAR; INTRAVENOUS at 01:09

## 2020-09-28 RX ADMIN — DEXMEDETOMIDINE HYDROCHLORIDE 0.2 MCG/KG/HR: 100 INJECTION, SOLUTION, CONCENTRATE INTRAVENOUS at 02:09

## 2020-09-28 RX ADMIN — SODIUM CHLORIDE, SODIUM GLUCONATE, SODIUM ACETATE, POTASSIUM CHLORIDE, MAGNESIUM CHLORIDE, SODIUM PHOSPHATE, DIBASIC, AND POTASSIUM PHOSPHATE: .53; .5; .37; .037; .03; .012; .00082 INJECTION, SOLUTION INTRAVENOUS at 01:09

## 2020-09-28 RX ADMIN — NALXONE HYDROCHLORIDE: 0.4 INJECTION INTRAMUSCULAR; INTRAVENOUS; SUBCUTANEOUS at 06:09

## 2020-09-28 RX ADMIN — FLUMAZENIL: 0.1 INJECTION, SOLUTION INTRAVENOUS at 06:09

## 2020-09-28 NOTE — Clinical Note
Prepped: right chest. Prepped with: ChloraPrep and Ioban. The site was clipped. The patient was draped.

## 2020-09-28 NOTE — Clinical Note
Lead was connected to generator. A new lead was attached to the following location: right atrium.

## 2020-09-28 NOTE — SUBJECTIVE & OBJECTIVE
Past Medical History:   Diagnosis Date    Anticoagulant long-term use     Aspirin    CHF (congestive heart failure)     Hyperlipidemia     Hypertension     NICM (nonischemic cardiomyopathy) 6/16/2020    Obesity        Past Surgical History:   Procedure Laterality Date    INSERTION OF BIVENTRICULAR IMPLANTABLE CARDIOVERTER-DEFIBRILLATOR (ICD) N/A 2/13/2019    Procedure: INSERTION, ICD, BIVENTRICULAR;  Surgeon: Shailesh Eng MD;  Location: [facility] CATH LAB;  Service: Cardiology;  Laterality: N/A;  RN PRE OP 2-6-19  1ST CASE PER  RADHA. NOTIFIED RADHA THAT ANESTHESIA IS NOT PITO FOR 1ST CASE START-LO    oral extraction  11/2018    TESTICLE SURGERY         Review of patient's allergies indicates:  No Known Allergies    No current facility-administered medications on file prior to encounter.      Current Outpatient Medications on File Prior to Encounter   Medication Sig    aspirin 325 MG tablet Take 325 mg by mouth once daily.    carvediloL (COREG) 12.5 MG tablet Take 1 tablet (12.5 mg total) by mouth 2 (two) times daily.    diclofenac sodium (VOLTAREN) 1 % Gel Apply 2 g topically 4 (four) times daily.    doxycycline (VIBRAMYCIN) 100 MG Cap Take 1 capsule (100 mg total) by mouth every 12 (twelve) hours.    furosemide (LASIX) 80 MG tablet Take 1 tablet (80 mg total) by mouth once daily.    gabapentin (NEURONTIN) 100 MG capsule Take 1 capsule (100 mg total) by mouth 3 (three) times daily.    lidocaine (LIDODERM) 5 % Place 1 patch onto the skin once daily. Remove & Discard patch within 12 hours or as directed by MD (Patient not taking: Reported on 8/12/2020)    pravastatin (PRAVACHOL) 80 MG tablet Take 1 tablet (80 mg total) by mouth once daily.    spironolactone (ALDACTONE) 25 MG tablet Take 1 tablet (25 mg total) by mouth once daily.    [DISCONTINUED] ramipril (ALTACE) 10 MG capsule Take 1 capsule (10 mg total) by mouth once daily.     Family History     Problem Relation (Age of Onset)    Epilepsy  Mother        Tobacco Use    Smoking status: Former Smoker     Packs/day: 0.50     Years: 40.00     Pack years: 20.00     Types: Cigarettes, Cigars     Quit date:      Years since quittin.7    Smokeless tobacco: Never Used   Substance and Sexual Activity    Alcohol use: Yes     Comment: once a month    Drug use: No    Sexual activity: Yes     Birth control/protection: None     Comment: uses protection sometimes     Review of Systems   Constitution: Positive for weight gain. Negative for decreased appetite, fever and malaise/fatigue.   HENT: Negative for congestion, hearing loss and nosebleeds.    Eyes: Negative for visual disturbance.   Cardiovascular: Positive for dyspnea on exertion, leg swelling and orthopnea. Negative for chest pain, irregular heartbeat, palpitations and syncope.   Respiratory: Negative for cough, shortness of breath and sleep disturbances due to breathing.    Endocrine: Negative for cold intolerance and heat intolerance.   Hematologic/Lymphatic: Negative for bleeding problem. Does not bruise/bleed easily.   Gastrointestinal: Negative for bloating, abdominal pain, change in bowel habit and heartburn.   Neurological: Negative for dizziness, loss of balance and numbness.   Psychiatric/Behavioral: Negative for altered mental status. The patient is not nervous/anxious.      Objective:     Vital Signs (Most Recent):  Temp: 97.1 °F (36.2 °C) (20 1223)  Pulse: 62 (20 1223)  Resp: 16 (20 1223)  BP: 111/62 (20 1223)  SpO2: (!) 94 % (20 1223) Vital Signs (24h Range):  Temp:  [97.1 °F (36.2 °C)] 97.1 °F (36.2 °C)  Pulse:  [62] 62  Resp:  [16] 16  SpO2:  [94 %] 94 %  BP: (111)/(62) 111/62          There is no height or weight on file to calculate BMI.    SpO2: (!) 94 %  O2 Device (Oxygen Therapy): room air    Physical Exam   Constitutional: He is oriented to person, place, and time. Vital signs are normal. He appears well-developed and well-nourished.   HENT:    Head: Normocephalic.   Eyes: Pupils are equal, round, and reactive to light.   Neck: Normal range of motion. Neck supple. JVD present.   Cardiovascular: Normal rate, regular rhythm, S1 normal, S2 normal, normal heart sounds and intact distal pulses.   Pulses:       Radial pulses are 2+ on the right side and 2+ on the left side.        Dorsalis pedis pulses are 2+ on the right side and 2+ on the left side.   LifeVest  Left upper chest incision well healed, no s/s infxn    Pulmonary/Chest: Effort normal and breath sounds normal.   Abdominal: Soft. Normal appearance and bowel sounds are normal.   Musculoskeletal: Normal range of motion.         General: Edema present.      Comments: BLE +2 pitting edema    Neurological: He is alert and oriented to person, place, and time. He has normal strength.   Skin: Skin is warm, dry and intact.   Psychiatric: He has a normal mood and affect. His speech is normal and behavior is normal. Judgment and thought content normal. Cognition and memory are normal.   Vitals reviewed.

## 2020-09-28 NOTE — PLAN OF CARE
Patient arrived to SSCU for pacemaker placement. PIVs placed to bilateral arms. No labs ordered. Anesthesia and procedure consents signed. Daughter at bedside. Patient refused nonskid socks, yellow armband placed on patient, stretcher in lowest locked position, call system in reach.

## 2020-09-28 NOTE — ASSESSMENT & PLAN NOTE
- Prior to procedure, we discussed the alternatives, benefits and risks of the procedure including pain, infection, bleeding, injury to lung causing pneumothorax requiring tube placement, injury to heart valves, puncture of the heart leading to pericardial effusion or tamponade requiring tube drainage, heart attack, stroke and death.  He voices understanding and all questions have been answered.   - Proceed with implantation of right sided CRT-D (SJM)   - Anesthesia for sedation

## 2020-09-28 NOTE — PROGRESS NOTES
Dr. Davila at bedside. Patient extremely hard to awaken and not responding to physical stimuli. VSS and oxygen saturation 100% on 6L SFM. MD speaking to respiratory therapist; plan to place on BiPaP.

## 2020-09-28 NOTE — HPI
Papito Bhakta presents to short stay cardiac unit on 09/28/2020 for planned CRT-D implant (right side) with Dr. Kwong.     Mr. Bhakta is a 65 y.o. male with a past medical history significant for hypertension, hyperlipidemia, nonischemic cardiomyopathy status post Bi-V ICD 02/13/2019 [employer] with Dr. Shailesh Eng (EF 10%).     In the past year he noted increased drainage from his pocket site and then following yard work, he had acute dehiscence of his pocket.     On 06/17/20 he underwent successful device extraction. Complex/difficult due to dense adhesions on lead, requiring use of a mechanical cutting sheath. Successful excision of necrotic/infected tissue (5x10x3 cm volume). LifeVest until a new right-sided biV ICD can be implanted.  Mr. Bhakta returned to arrhythmia clinic in 08/2020 for follow-up.  At that time his wound site was well healed and he was asymptomatic.  Echo from 06/2020 with LVEF 25%.     Today, Mr. Bhakta reports feeling fatigued and has ongoing KING.  He estimates that he is approximately +20 lb above his dry weight with worsening BLE edema.  He is prescribed Lasix 80 mg daily, reports taking it bid per his cardiologists recommendation.    Denies bleeding, chest pain, palpitations, fever, or other acute symptoms.     EKG:   My independent visualization of most recent EKG is sinus rhythm with first degree AV block and LBBB.  ms. QRS 168ms.  ms.

## 2020-09-28 NOTE — ANESTHESIA PREPROCEDURE EVALUATION
[facility]  Anesthesia Pre-Operative Evaluation         Patient Name: Papito Bhakta  YOB: 1955  MRN: [id number]    SUBJECTIVE:     Pre-operative evaluation for Procedure(s) (LRB):    : INSERTION, ICD  09/28/2020    Papito Bhakta is a 65 y.o. male w/ a significant PMHx of hypertension, hyperlipidemia, nonischemic cardiomyopathy status post Bi-V ICD 02/13/2013 [employer] with Dr. Shailesh Eng  EF 10%. Now with increased drainage from pocket site.     · Limited study to assess function  · Severely decreased left ventricular systolic function. The estimated ejection fraction is 10%  · Left ventricular diastolic dysfunction.  · Severely reduced right ventricular systolic function.  · No thrombus    Patient now presents for the above procedure(s).      LDA: None documented.       Peripheral IV - Single Lumen 06/16/20 0733 20 G Anterior;Proximal;Right Forearm (Active)   Site Assessment Clean;No redness;Intact;Dry;No swelling 06/16/20 0758   Line Status Saline locked 06/16/20 0758   Dressing Status Clean;Dry;Intact 06/16/20 0758   Dressing Intervention First dressing 06/16/20 0758   Dressing Change Due 06/19/20 06/16/20 0758   Site Change Due 06/19/20 06/16/20 0758   Reason Not Rotated Not due 06/16/20 0758   Number of days: 0            Arterial Line 02/13/19 1338 (Active)   Number of days: 488       Prev airway:     Placement Date: 02/13/19; Placement Time: 1150; Method of Intubation: Direct laryngoscopy; Inserted by: CRNA; Airway Device: Endotracheal Tube-Hi/Lo; Mask Ventilation: Easy - oral; Intubated: Postinduction; Blade: Meehan #2; Airway Device Size: 8.0; Style: Cuffed; Cuff Inflation: Minimal occlusive pressure; Placement Verified By: Auscultation, Capnometry, ETT Condensation; Grade: Grade I; Complicating Factors: None; Intubation Findings: Positive EtCO2, Bilateral breath sounds, Atraumatic/Condition of teeth unchanged;  Depth of Insertion: 21; Securment: Lips; Complications:  None; Breath Sounds: Equal Bilateral; Insertion Attempts: 1; Removal Date: 02/13/19;  Removal Time: 1316      Drips: None documented.      Patient Active Problem List   Diagnosis    Other hyperlipidemia    Essential hypertension    Chronic combined systolic and diastolic congestive heart failure    Hyperlipidemia    Severe obesity (BMI 35.0-39.9) with comorbidity    Biventricular ICD (implantable cardioverter-defibrillator) in place    Chronic left shoulder pain    Decreased range of motion of left shoulder    Decreased strength of upper extremity    Congestive heart failure    Infection of pacemaker pocket    Erosion of pacemaker pocket due to and not concurrent with implantation of cardiac pacemaker    NICM (nonischemic cardiomyopathy)    Infection of biventricular implantable cardioverter-defibrillator (ICD)       Review of patient's allergies indicates:  No Known Allergies    Current Inpatient Medications:      Current Facility-Administered Medications on File Prior to Visit   Medication Dose Route Frequency Provider Last Rate Last Dose    ceFAZolin injection 2 g  2 g Intravenous On Call Procedure Ana Cerrato NP        sodium chloride 0.9% bolus 1,000 mL  1,000 mL Intravenous Once Ana Cerrato NP         Current Outpatient Medications on File Prior to Visit   Medication Sig Dispense Refill    aspirin 325 MG tablet Take 325 mg by mouth once daily.      carvediloL (COREG) 12.5 MG tablet Take 1 tablet (12.5 mg total) by mouth 2 (two) times daily. 60 tablet 11    diclofenac sodium (VOLTAREN) 1 % Gel Apply 2 g topically 4 (four) times daily. 100 g 1    doxycycline (VIBRAMYCIN) 100 MG Cap Take 1 capsule (100 mg total) by mouth every 12 (twelve) hours. 30 capsule 0    furosemide (LASIX) 80 MG tablet Take 1 tablet (80 mg total) by mouth once daily. 90 tablet 1    gabapentin (NEURONTIN) 100 MG capsule Take 1 capsule (100 mg total) by mouth 3 (three) times daily. 90 capsule 1    lidocaine  (LIDODERM) 5 % Place 1 patch onto the skin once daily. Remove & Discard patch within 12 hours or as directed by MD (Patient not taking: Reported on 2020) 30 patch 0    pravastatin (PRAVACHOL) 80 MG tablet Take 1 tablet (80 mg total) by mouth once daily. 90 tablet 3    spironolactone (ALDACTONE) 25 MG tablet Take 1 tablet (25 mg total) by mouth once daily. 90 tablet 3    varicella-zoster gE-AS01B, PF, (SHINGRIX) 50 mcg/0.5 mL injection Inject into the muscle. 1 each 0    [DISCONTINUED] ramipril (ALTACE) 10 MG capsule Take 1 capsule (10 mg total) by mouth once daily. 90 capsule 3       Past Surgical History:   Procedure Laterality Date    INSERTION OF BIVENTRICULAR IMPLANTABLE CARDIOVERTER-DEFIBRILLATOR (ICD) N/A 2019    Procedure: INSERTION, ICD, BIVENTRICULAR;  Surgeon: Shailesh Eng MD;  Location: [facility] CATH LAB;  Service: Cardiology;  Laterality: N/A;  RN PRE OP 19  1ST CASE PER  RADHA. NOTIFIED RADHA THAT ANESTHESIA IS NOT PITO FOR 1ST CASE START-LO    oral extraction  2018    TESTICLE SURGERY         Social History     Socioeconomic History    Marital status:      Spouse name: Not on file    Number of children: Not on file    Years of education: Not on file    Highest education level: Not on file   Occupational History    Not on file   Social Needs    Financial resource strain: Not on file    Food insecurity     Worry: Not on file     Inability: Not on file    Transportation needs     Medical: Not on file     Non-medical: Not on file   Tobacco Use    Smoking status: Former Smoker     Packs/day: 0.50     Years: 40.00     Pack years: 20.00     Types: Cigarettes, Cigars     Quit date:      Years since quittin.7    Smokeless tobacco: Never Used   Substance and Sexual Activity    Alcohol use: Yes     Comment: once a month    Drug use: No    Sexual activity: Yes     Birth control/protection: None     Comment: uses protection sometimes   Lifestyle    Physical  activity     Days per week: Not on file     Minutes per session: Not on file    Stress: Not on file   Relationships    Social connections     Talks on phone: Not on file     Gets together: Not on file     Attends Restoration service: Not on file     Active member of club or organization: Not on file     Attends meetings of clubs or organizations: Not on file     Relationship status: Not on file   Other Topics Concern    Not on file   Social History Narrative    Not on file       OBJECTIVE:     Vital Signs Range (Last 24H):         Significant Labs:  Lab Results   Component Value Date    WBC 5.25 09/25/2020    HGB 11.2 (L) 09/25/2020    HCT 38.6 (L) 09/25/2020     09/25/2020    CHOL 160 12/02/2017    TRIG 106 12/02/2017    HDL 46 12/02/2017    ALT 6 (L) 06/20/2020    AST 12 06/20/2020     09/25/2020    K 3.9 09/25/2020     09/25/2020    CREATININE 1.3 09/25/2020    BUN 16 09/25/2020    CO2 30 (H) 09/25/2020    INR 1.2 09/25/2020       Diagnostic Studies: No relevant studies.    EKG:   Results for orders placed or performed during the hospital encounter of 06/15/20   EKG 12-lead    Collection Time: 06/17/20  5:47 AM    Narrative    Test Reason : I42.8,    Vent. Rate : 059 BPM     Atrial Rate : 059 BPM     P-R Int : 204 ms          QRS Dur : 166 ms      QT Int : 532 ms       P-R-T Axes : 089 005 127 degrees     QTc Int : 526 ms    Sinus bradycardia with sinus arrhythmia  Left bundle branch block  Abnormal ECG  When compared with ECG of 16-JUN-2020 07:04,  Sinus rhythm has replaced Electronic ventricular pacemaker  Confirmed by TANMAY MILLS MD ([id number]) on 6/17/2020 4:02:57 PM    Referred By: [email or web address]   SELF           Confirmed By:TANMAY MILLS MD       2D ECHO:  TTE:  Results for orders placed or performed during the hospital encounter of 11/04/18   Transthoracic echo (TTE) complete (Cupid Only)   Result Value Ref Range    BSA 2.84 m2    Ascending aorta 3.43 cm    STJ 2.46 cm    AV mean gradient  5.10 mmHg    Ao peak nicola 1.43 m/s    Ao VTI 24.82 cm    IVRT 0.09 msec    IVS 1.18 (A) 0.6 - 1.1 cm    LA size 5.85 cm    Left Atrium Major Axis 6.77 cm    Left Atrium Minor Axis 6.36 cm    LVIDd 6.97 (A) 3.5 - 6.0 cm    LVIDs 6.19 (A) 2.1 - 4.0 cm    LVOT diameter 2.40 cm    LVOT peak VTI 15.73 cm    Posterior Wall 1.15 (A) 0.6 - 1.1 cm    MV Peak A Nicola 0.42 m/s    E wave decelartion time 146.03 msec    MV Peak E Nicola 1.18 m/s    RA Major Axis 5.39 cm    RA Width 4.82 cm    RVDD 5.41 cm    Sinus 3.73 cm    TR Max Nicola 2.44 m/s    TDI LATERAL 0.10     TDI SEPTAL 0.09     LA WIDTH 5.23 cm    Ao root annulus 3.42 cm    AORTIC VALVE CUSP SEPERATION 2.02 cm    PV PEAK VELOCITY 1.09 cm/s    PV peak gradient 4.71 mmHg    MV stenosis pressure 1/2 time 42.35 ms    AV peak gradient 8.22 mmHg    LV Diastolic Volume 253.21 mL    LV Systolic Volume 193.03 mL    LV LATERAL E/E' RATIO 11.80     LV SEPTAL E/E' RATIO 13.11     FS 11 %    QEF 23.77 %    LA volume 170.56 cm3    LV mass 388.10 g    Left Ventricle Relative Wall Thickness 0.33 cm    AV valve area 2.87 cm2    MV valve area p 1/2 method 5.19 cm2    E/A ratio 2.81     Mean e' 0.10     LVOT area 4.52 cm2    LVOT stroke volume 71.12 cm3    E/E' ratio 12.42     LV Systolic Volume Index 68.0 mL/m2    LV Diastolic Volume Index 89.16 mL/m2    LA Volume Index 60.1 mL/m2    LV Mass Index 136.7 g/m2    Triscuspid Valve Regurgitation Peak Gradient 23.81 mmHg    Right Atrial Pressure (from IVC) 8.0 mmHg    TV rest pulmonary artery pressure 31.81 mmHg    Narrative    · Left ventricle ejection fraction is severely decreased at 20%  · The left ventricle cavity is mildly dilated.  · Left ventricle shows eccentric hypertrophy.  · Grade III (severe) left ventricular diastolic dysfunction consistent   with restrictive physiology.  · LA pressure is elevated.  · RV systolic function is severely reduced.  · Left atrium is severely dilated.  · Moderately elevated central venous pressure (8 mm  Hg).  · The estimated PA systolic pressure is 31.81 mm Hg          JASSON:  Results for orders placed or performed during the hospital encounter of 06/15/20   Transesophageal echo (JASSON)   Result Value Ref Range    BSA 2.81 m2    TV area PISA 0.41 cm2    PISA TR VN Nyquist 0.36 m/s    PISA TR Radius 0.8 cm    TR Max Nicola 3.50 m/s    Triscuspid Valve Regurgitation Peak Gradient 49 mmHg    Narrative    · Severely decreased left ventricular systolic function. The estimated   ejection fraction is 25%.  · Low normal right ventricular systolic function.  · Normal appearing left atrial appendage. No thrombus is present in the   appendage.  · RA and RV ICD leads visualized in the posterior SVC along the vessel   wall before extraction.  · S/p Successful device and lead extraction. No pericardial effusion   before or after procedure. No worsening TR post procedure. Normal SVC   anatomy post procedure. Wire seen in SVC post extraction is an amplatzer   wire.  · PISA radius 0.8 cm, EROA 41 mm2, RVOL 53mL, Systolic flow reversal in   Left superior pulmonary vein. Severe mitral regurgitation by PISA.  · Moderate tricuspid regurgitation.  · Mild to moderate pulmonic regurgitation.  · Pulmonary hypertension present.          ASSESSMENT/PLAN:                                                                                                                  09/28/2020  Papito Bhakta is a 65 y.o., male.    Pre-op Assessment    I have reviewed the Patient Summary Reports.     I have reviewed the Nursing Notes.    I have reviewed the Medications.     Review of Systems  Anesthesia Hx:  No problems with previous Anesthesia Denies Hx of Anesthetic complications  Neg history of prior surgery. Denies Family Hx of Anesthesia complications.   Denies Personal Hx of Anesthesia complications.   Social:  Former Smoker    Hematology/Oncology:  Hematology Normal   Oncology Normal     EENT/Dental:EENT/Dental Normal   Cardiovascular:   Exercise tolerance:  good Hypertension CHF hyperlipidemia · Limited study to assess function  · Severely decreased left ventricular systolic function. The estimated ejection fraction is 10%  · Left ventricular diastolic dysfunction.  · Severely reduced right ventricular systolic function.  · No thrombus   Pulmonary:   Pneumonia    Renal/:  Renal/ Normal     Hepatic/GI:  Hepatic/GI Normal    Musculoskeletal:  Musculoskeletal Normal    Neurological:  Neurology Normal    Endocrine:  Endocrine Normal    Dermatological:  Skin Normal    Psych:  Psychiatric Normal           Physical Exam  General:  Well nourished, Obesity    Airway/Jaw/Neck:  Airway Findings: Mouth Opening: Normal Tongue: Normal  General Airway Assessment: Adult  Mallampati: II  TM Distance: Normal, at least 6 cm        Eyes/Ears/Nose:  Eyes/Ears/Nose Findings:    Dental:  Dental Findings: In tactNo upper teeth    Heart/Vascular:  Heart murmur: negative       Mental Status:  Mental Status Findings:  Cooperative, Alert and Oriented         Anesthesia Plan  Type of Anesthesia, risks & benefits discussed:  Anesthesia Type:  general, MAC  Patient's Preference: General  Intra-op Monitoring Plan: standard ASA monitors  Intra-op Monitoring Plan Comments:   Post Op Pain Control Plan: multimodal analgesia  Post Op Pain Control Plan Comments:   Induction:   IV  Beta Blocker:  Patient is on a Beta-Blocker and has received one dose within the past 24 hours (No further documentation required).       Informed Consent: Patient understands risks and agrees with Anesthesia plan.  Questions answered. Anesthesia consent signed with patient.  ASA Score: 4     Day of Surgery Review of History & Physical:    H&P update referred to the surgeon.     Anesthesia Plan Notes: Discussed plan for general endotracheal anesthesia, pt understands and agrees with plan        Ready For Surgery From Anesthesia Perspective.

## 2020-09-28 NOTE — Clinical Note
Lead was connected to generator. A new lead was attached to the following location: coronary sinus.

## 2020-09-28 NOTE — TRANSFER OF CARE
"Anesthesia Transfer of Care Note    Patient: Papito Bhakta    Procedure(s) Performed: Procedure(s) (LRB):  INSERTION, ICD, BIVENTRICULAR (N/A)    Patient location: PACU    Anesthesia Type: general    Transport from OR: Transported from OR on 6-10 L/min O2 by face mask with adequate spontaneous ventilation    Post pain: adequate analgesia    Post assessment: no apparent anesthetic complications and tolerated procedure well    Post vital signs: stable    Level of consciousness: sedated and responds to stimulation    Nausea/Vomiting: no nausea/vomiting    Complications: none    Transfer of care protocol was followed      Last vitals:   Visit Vitals  /68 (BP Location: Left arm, Patient Position: Lying)   Pulse 62   Temp 36.2 °C (97.1 °F) (Oral)   Resp 16   Ht 6' 5" (1.956 m)   Wt (!) 158.8 kg (350 lb)   SpO2 (!) 94%   BMI 41.50 kg/m²     "

## 2020-09-28 NOTE — Clinical Note
A venogram was performed in the right axillary vein. The vessel was injected with hand injection with 10 mL of contrast. Per CRNA via right arm PIV

## 2020-09-28 NOTE — H&P
[facility] Short Stay Cardiac Unit  Cardiac Electrophysiology  History and Physical     Admission Date: 9/28/2020  Code Status: Prior   Attending Provider: Jim Kwong MD   Principal Problem:<principal problem not specified>    Subjective:     Chief Complaint:  NICM, CRT-D      HPI:  Papito Bhakta presents to short stay cardiac unit on 09/28/2020 for planned CRT-D implant (right side) with Dr. Kwong.     Mr. Bhakta is a 65 y.o. male with a past medical history significant for hypertension, hyperlipidemia, nonischemic cardiomyopathy status post Bi-V ICD 02/13/2019 [employer] with Dr. Shailesh Eng (EF 10%).     In the past year he noted increased drainage from his pocket site and then following yard work, he had acute dehiscence of his pocket.     On 06/17/20 he underwent successful device extraction. Complex/difficult due to dense adhesions on lead, requiring use of a mechanical cutting sheath. Successful excision of necrotic/infected tissue (5x10x3 cm volume). LifeVest until a new right-sided biV ICD can be implanted.  Mr. Bhakta returned to arrhythmia clinic in 08/2020 for follow-up.  At that time his wound site was well healed and he was asymptomatic.  Echo from 06/2020 with LVEF 25%.     Today, Mr. Bhakta reports feeling well. He denies any bleeding, overt shortness of breath, chest pains, fevers, chills, or any other acute symptoms.     EKG:   My independent visualization of most recent EKG is sinus rhythm with first degree AV block and LBBB.  ms. QRS 168ms.  ms.    Past Medical History:   Diagnosis Date    Anticoagulant long-term use     Aspirin    CHF (congestive heart failure)     Hyperlipidemia     Hypertension     NICM (nonischemic cardiomyopathy) 6/16/2020    Obesity        Past Surgical History:   Procedure Laterality Date    INSERTION OF BIVENTRICULAR IMPLANTABLE CARDIOVERTER-DEFIBRILLATOR (ICD) N/A 2/13/2019    Procedure: INSERTION, ICD, BIVENTRICULAR;   Surgeon: Shailesh Eng MD;  Location: [facility] CATH LAB;  Service: Cardiology;  Laterality: N/A;  RN PRE OP 19  1ST CASE PER  RAD[facility]. NOTIFIED RADHA THAT ANESTHESIA IS NOT PITO FOR 1ST CASE START-LO    oral extraction  2018    TESTICLE SURGERY         Review of patient's allergies indicates:  No Known Allergies    No current facility-administered medications on file prior to encounter.      Current Outpatient Medications on File Prior to Encounter   Medication Sig    aspirin 325 MG tablet Take 325 mg by mouth once daily.    carvediloL (COREG) 12.5 MG tablet Take 1 tablet (12.5 mg total) by mouth 2 (two) times daily.    diclofenac sodium (VOLTAREN) 1 % Gel Apply 2 g topically 4 (four) times daily.    doxycycline (VIBRAMYCIN) 100 MG Cap Take 1 capsule (100 mg total) by mouth every 12 (twelve) hours.    furosemide (LASIX) 80 MG tablet Take 1 tablet (80 mg total) by mouth once daily.    gabapentin (NEURONTIN) 100 MG capsule Take 1 capsule (100 mg total) by mouth 3 (three) times daily.    lidocaine (LIDODERM) 5 % Place 1 patch onto the skin once daily. Remove & Discard patch within 12 hours or as directed by MD (Patient not taking: Reported on 2020)    pravastatin (PRAVACHOL) 80 MG tablet Take 1 tablet (80 mg total) by mouth once daily.    spironolactone (ALDACTONE) 25 MG tablet Take 1 tablet (25 mg total) by mouth once daily.    [DISCONTINUED] ramipril (ALTACE) 10 MG capsule Take 1 capsule (10 mg total) by mouth once daily.     Family History     Problem Relation (Age of Onset)    Epilepsy Mother        Tobacco Use    Smoking status: Former Smoker     Packs/day: 0.50     Years: 40.00     Pack years: 20.00     Types: Cigarettes, Cigars     Quit date:      Years since quittin.7    Smokeless tobacco: Never Used   Substance and Sexual Activity    Alcohol use: Yes     Comment: once a month    Drug use: No    Sexual activity: Yes     Birth control/protection: None     Comment: uses  protection sometimes     Review of Systems   Constitution: Positive for weight gain. Negative for decreased appetite, fever and malaise/fatigue.   HENT: Negative for congestion, hearing loss and nosebleeds.    Eyes: Negative for visual disturbance.   Cardiovascular: Positive for dyspnea on exertion, leg swelling and orthopnea. Negative for chest pain, irregular heartbeat, palpitations and syncope.   Respiratory: Negative for cough, shortness of breath and sleep disturbances due to breathing.    Endocrine: Negative for cold intolerance and heat intolerance.   Hematologic/Lymphatic: Negative for bleeding problem. Does not bruise/bleed easily.   Gastrointestinal: Negative for bloating, abdominal pain, change in bowel habit and heartburn.   Neurological: Negative for dizziness, loss of balance and numbness.   Psychiatric/Behavioral: Negative for altered mental status. The patient is not nervous/anxious.      Objective:     Vital Signs (Most Recent):  Temp: 97.1 °F (36.2 °C) (09/28/20 1223)  Pulse: 62 (09/28/20 1223)  Resp: 16 (09/28/20 1223)  BP: 111/62 (09/28/20 1223)  SpO2: (!) 94 % (09/28/20 1223) Vital Signs (24h Range):  Temp:  [97.1 °F (36.2 °C)] 97.1 °F (36.2 °C)  Pulse:  [62] 62  Resp:  [16] 16  SpO2:  [94 %] 94 %  BP: (111)/(62) 111/62          There is no height or weight on file to calculate BMI.    SpO2: (!) 94 %  O2 Device (Oxygen Therapy): room air    Physical Exam   Constitutional: He is oriented to person, place, and time. Vital signs are normal. He appears well-developed and well-nourished.   HENT:   Head: Normocephalic.   Eyes: Pupils are equal, round, and reactive to light.   Neck: Normal range of motion. Neck supple. JVD present.   Cardiovascular: Normal rate, regular rhythm, S1 normal, S2 normal, normal heart sounds and intact distal pulses.   Pulses:       Radial pulses are 2+ on the right side and 2+ on the left side.        Dorsalis pedis pulses are 2+ on the right side and 2+ on the left side.    LifeVest  Left upper chest incision well healed, no s/s infxn    Pulmonary/Chest: Effort normal and breath sounds normal.   Abdominal: Soft. Normal appearance and bowel sounds are normal.   Musculoskeletal: Normal range of motion.         General: Edema present.      Comments: BLE +2 pitting edema    Neurological: He is alert and oriented to person, place, and time. He has normal strength.   Skin: Skin is warm, dry and intact.   Psychiatric: He has a normal mood and affect. His speech is normal and behavior is normal. Judgment and thought content normal. Cognition and memory are normal.   Vitals reviewed.        Assessment and Plan:     NICM (nonischemic cardiomyopathy)  - Prior to procedure, we discussed the alternatives, benefits and risks of the procedure including pain, infection, bleeding, injury to lung causing pneumothorax requiring tube placement, injury to heart valves, puncture of the heart leading to pericardial effusion or tamponade requiring tube drainage, heart attack, stroke and death.  He voices understanding and all questions have been answered.   - Proceed with implantation of right sided CRT-D (SJM)   - Anesthesia for sedation     Ana Cerrato NP  Cardiac Electrophysiology  [facility] Short Stay Cardiac Unit

## 2020-09-28 NOTE — PROGRESS NOTES
ABG's drawn per Dr. Davila. Dr. Hudson and Jenna at bedside. Plan to redraw ABG's in 30 minutes. Patient starting to arouse to voice.

## 2020-09-28 NOTE — Clinical Note
Injectable Influenza Immunization Documentation    1.  Is the person to be vaccinated sick today?   No    2. Does the person to be vaccinated have an allergy to a component   of the vaccine?   No  Egg Allergy Algorithm Link    3. Has the person to be vaccinated ever had a serious reaction   to influenza vaccine in the past?   No    4. Has the person to be vaccinated ever had Guillain-Barré syndrome?   No    Form completed by Margartete Zayas            A venogram was performed in the coronary sinus. The vessel was injected with hand injection with 8 mL of contrast.

## 2020-09-28 NOTE — ANESTHESIA PROCEDURE NOTES
"Post Anesthesia Patient Management  Reason for Note: I returned to PACU bedside to examine the patient.   Surgery related to: EP    Staffing  Authorizing Provider: Ant Davila MD  Performing Provider: Ant Davila MD      CARDIOVASCULAR:      NEUROLOGICAL:      Confusion / Somnolent / Unresponsive  Initial Presenting Signs/Symptoms: Unresponsive  Etiology Suspected: Hypercarbia, Residual Anesthetic    Additional Notes:   Patient less responsive than when leaving procedure room and noted to be shallow breathing. When leaving procedure room, was arousable to vigorous tactile and verbal stimulus. Essentially unresponsive now and noted to be breathing shallow with continued "abdominal rocking" (although baseline respiratory status was noted as poor and with abdominal breathing).     Placed on BiPAP with improvement in air exchange (550TV), will monitor closely.           "

## 2020-09-28 NOTE — Clinical Note
A venogram was performed in the coronary sinus. The vessel was injected with hand injection with 1 mL of contrast.

## 2020-09-29 LAB
ALBUMIN SERPL BCP-MCNC: 2.7 G/DL (ref 3.5–5.2)
ALBUMIN SERPL BCP-MCNC: 2.8 G/DL (ref 3.5–5.2)
ALP SERPL-CCNC: 87 U/L (ref 55–135)
ALT SERPL W/O P-5'-P-CCNC: 32 U/L (ref 10–44)
ANION GAP SERPL CALC-SCNC: 10 MMOL/L (ref 8–16)
ANION GAP SERPL CALC-SCNC: 8 MMOL/L (ref 8–16)
ANION GAP SERPL CALC-SCNC: 9 MMOL/L (ref 8–16)
AST SERPL-CCNC: 36 U/L (ref 10–40)
BASOPHILS # BLD AUTO: 0.04 K/UL (ref 0–0.2)
BASOPHILS NFR BLD: 0.6 % (ref 0–1.9)
BILIRUB SERPL-MCNC: 1.4 MG/DL (ref 0.1–1)
BNP SERPL-MCNC: 584 PG/ML (ref 0–99)
BUN SERPL-MCNC: 17 MG/DL (ref 8–23)
BUN SERPL-MCNC: 18 MG/DL (ref 8–23)
BUN SERPL-MCNC: 18 MG/DL (ref 8–23)
CALCIUM SERPL-MCNC: 8.4 MG/DL (ref 8.7–10.5)
CALCIUM SERPL-MCNC: 8.5 MG/DL (ref 8.7–10.5)
CALCIUM SERPL-MCNC: 8.7 MG/DL (ref 8.7–10.5)
CHLORIDE SERPL-SCNC: 100 MMOL/L (ref 95–110)
CHLORIDE SERPL-SCNC: 100 MMOL/L (ref 95–110)
CHLORIDE SERPL-SCNC: 98 MMOL/L (ref 95–110)
CO2 SERPL-SCNC: 32 MMOL/L (ref 23–29)
CO2 SERPL-SCNC: 33 MMOL/L (ref 23–29)
CO2 SERPL-SCNC: 34 MMOL/L (ref 23–29)
CREAT SERPL-MCNC: 1.1 MG/DL (ref 0.5–1.4)
CREAT SERPL-MCNC: 1.2 MG/DL (ref 0.5–1.4)
CREAT SERPL-MCNC: 1.2 MG/DL (ref 0.5–1.4)
DIFFERENTIAL METHOD: ABNORMAL
EOSINOPHIL # BLD AUTO: 0.1 K/UL (ref 0–0.5)
EOSINOPHIL NFR BLD: 1.5 % (ref 0–8)
ERYTHROCYTE [DISTWIDTH] IN BLOOD BY AUTOMATED COUNT: 15.8 % (ref 11.5–14.5)
EST. GFR  (AFRICAN AMERICAN): >60 ML/MIN/1.73 M^2
EST. GFR  (NON AFRICAN AMERICAN): >60 ML/MIN/1.73 M^2
GLUCOSE SERPL-MCNC: 108 MG/DL (ref 70–110)
GLUCOSE SERPL-MCNC: 111 MG/DL (ref 70–110)
GLUCOSE SERPL-MCNC: 66 MG/DL (ref 70–110)
HCT VFR BLD AUTO: 36 % (ref 40–54)
HGB BLD-MCNC: 10.5 G/DL (ref 14–18)
IMM GRANULOCYTES # BLD AUTO: 0.02 K/UL (ref 0–0.04)
IMM GRANULOCYTES NFR BLD AUTO: 0.3 % (ref 0–0.5)
LYMPHOCYTES # BLD AUTO: 1 K/UL (ref 1–4.8)
LYMPHOCYTES NFR BLD: 15.2 % (ref 18–48)
MAGNESIUM SERPL-MCNC: 1.8 MG/DL (ref 1.6–2.6)
MCH RBC QN AUTO: 23.2 PG (ref 27–31)
MCHC RBC AUTO-ENTMCNC: 29.2 G/DL (ref 32–36)
MCV RBC AUTO: 80 FL (ref 82–98)
MONOCYTES # BLD AUTO: 0.9 K/UL (ref 0.3–1)
MONOCYTES NFR BLD: 13.7 % (ref 4–15)
NEUTROPHILS # BLD AUTO: 4.6 K/UL (ref 1.8–7.7)
NEUTROPHILS NFR BLD: 68.7 % (ref 38–73)
NRBC BLD-RTO: 0 /100 WBC
PHOSPHATE SERPL-MCNC: 3 MG/DL (ref 2.7–4.5)
PHOSPHATE SERPL-MCNC: 3.2 MG/DL (ref 2.7–4.5)
PLATELET # BLD AUTO: 201 K/UL (ref 150–350)
PMV BLD AUTO: 10.6 FL (ref 9.2–12.9)
POTASSIUM SERPL-SCNC: 3.2 MMOL/L (ref 3.5–5.1)
POTASSIUM SERPL-SCNC: 3.6 MMOL/L (ref 3.5–5.1)
POTASSIUM SERPL-SCNC: 3.8 MMOL/L (ref 3.5–5.1)
PROT SERPL-MCNC: 6.9 G/DL (ref 6–8.4)
RBC # BLD AUTO: 4.53 M/UL (ref 4.6–6.2)
SARS-COV-2 RDRP RESP QL NAA+PROBE: NEGATIVE
SODIUM SERPL-SCNC: 140 MMOL/L (ref 136–145)
SODIUM SERPL-SCNC: 142 MMOL/L (ref 136–145)
SODIUM SERPL-SCNC: 142 MMOL/L (ref 136–145)
TROPONIN I SERPL DL<=0.01 NG/ML-MCNC: 0.04 NG/ML (ref 0–0.03)
WBC # BLD AUTO: 6.63 K/UL (ref 3.9–12.7)

## 2020-09-29 PROCEDURE — 84484 ASSAY OF TROPONIN QUANT: CPT | Mod: HCNC

## 2020-09-29 PROCEDURE — 96372 THER/PROPH/DIAG INJ SC/IM: CPT | Mod: 59

## 2020-09-29 PROCEDURE — 85025 COMPLETE CBC W/AUTO DIFF WBC: CPT | Mod: HCNC

## 2020-09-29 PROCEDURE — 25000003 PHARM REV CODE 250: Mod: HCNC | Performed by: STUDENT IN AN ORGANIZED HEALTH CARE EDUCATION/TRAINING PROGRAM

## 2020-09-29 PROCEDURE — 96374 THER/PROPH/DIAG INJ IV PUSH: CPT

## 2020-09-29 PROCEDURE — 83735 ASSAY OF MAGNESIUM: CPT | Mod: HCNC

## 2020-09-29 PROCEDURE — 25000003 PHARM REV CODE 250: Mod: HCNC | Performed by: NURSE PRACTITIONER

## 2020-09-29 PROCEDURE — 94660 CPAP INITIATION&MGMT: CPT | Mod: HCNC

## 2020-09-29 PROCEDURE — 99220 PR INITIAL OBSERVATION CARE,LEVL III: CPT | Mod: HCNC,GC,, | Performed by: INTERNAL MEDICINE

## 2020-09-29 PROCEDURE — 94761 N-INVAS EAR/PLS OXIMETRY MLT: CPT | Mod: HCNC

## 2020-09-29 PROCEDURE — 27000221 HC OXYGEN, UP TO 24 HOURS: Mod: HCNC

## 2020-09-29 PROCEDURE — 80069 RENAL FUNCTION PANEL: CPT | Mod: HCNC

## 2020-09-29 PROCEDURE — U0002 COVID-19 LAB TEST NON-CDC: HCPCS | Mod: HCNC

## 2020-09-29 PROCEDURE — 80053 COMPREHEN METABOLIC PANEL: CPT | Mod: HCNC

## 2020-09-29 PROCEDURE — 99220 PR INITIAL OBSERVATION CARE,LEVL III: ICD-10-PCS | Mod: HCNC,GC,, | Performed by: INTERNAL MEDICINE

## 2020-09-29 PROCEDURE — 83880 ASSAY OF NATRIURETIC PEPTIDE: CPT | Mod: HCNC

## 2020-09-29 PROCEDURE — G0378 HOSPITAL OBSERVATION PER HR: HCPCS | Mod: HCNC

## 2020-09-29 PROCEDURE — 63600175 PHARM REV CODE 636 W HCPCS: Mod: HCNC | Performed by: NURSE PRACTITIONER

## 2020-09-29 PROCEDURE — 36415 COLL VENOUS BLD VENIPUNCTURE: CPT | Mod: HCNC

## 2020-09-29 PROCEDURE — 63600175 PHARM REV CODE 636 W HCPCS: Mod: HCNC | Performed by: STUDENT IN AN ORGANIZED HEALTH CARE EDUCATION/TRAINING PROGRAM

## 2020-09-29 PROCEDURE — 99900035 HC TECH TIME PER 15 MIN (STAT): Mod: HCNC

## 2020-09-29 PROCEDURE — 27000190 HC CPAP FULL FACE MASK W/VALVE: Mod: HCNC

## 2020-09-29 PROCEDURE — 80048 BASIC METABOLIC PNL TOTAL CA: CPT | Mod: HCNC

## 2020-09-29 PROCEDURE — 84100 ASSAY OF PHOSPHORUS: CPT | Mod: HCNC

## 2020-09-29 RX ORDER — SODIUM CHLORIDE 0.9 % (FLUSH) 0.9 %
10 SYRINGE (ML) INJECTION
Status: DISCONTINUED | OUTPATIENT
Start: 2020-09-29 | End: 2020-10-15 | Stop reason: HOSPADM

## 2020-09-29 RX ORDER — POTASSIUM CHLORIDE 20 MEQ/1
40 TABLET, EXTENDED RELEASE ORAL
Status: COMPLETED | OUTPATIENT
Start: 2020-09-29 | End: 2020-09-29

## 2020-09-29 RX ORDER — FUROSEMIDE 10 MG/ML
60 INJECTION INTRAMUSCULAR; INTRAVENOUS ONCE
Status: COMPLETED | OUTPATIENT
Start: 2020-09-29 | End: 2020-09-29

## 2020-09-29 RX ORDER — GLUCAGON 1 MG
1 KIT INJECTION
Status: DISCONTINUED | OUTPATIENT
Start: 2020-09-29 | End: 2020-10-15 | Stop reason: HOSPADM

## 2020-09-29 RX ORDER — IBUPROFEN 200 MG
16 TABLET ORAL
Status: DISCONTINUED | OUTPATIENT
Start: 2020-09-29 | End: 2020-10-15 | Stop reason: HOSPADM

## 2020-09-29 RX ORDER — HEPARIN SODIUM 5000 [USP'U]/ML
7500 INJECTION, SOLUTION INTRAVENOUS; SUBCUTANEOUS EVERY 8 HOURS
Status: DISCONTINUED | OUTPATIENT
Start: 2020-09-29 | End: 2020-10-06

## 2020-09-29 RX ORDER — CEPHALEXIN 500 MG/1
500 CAPSULE ORAL EVERY 8 HOURS
Qty: 15 CAPSULE | Refills: 0 | Status: SHIPPED | OUTPATIENT
Start: 2020-09-29 | End: 2020-10-11 | Stop reason: HOSPADM

## 2020-09-29 RX ORDER — FUROSEMIDE 10 MG/ML
80 INJECTION INTRAMUSCULAR; INTRAVENOUS 2 TIMES DAILY
Status: DISCONTINUED | OUTPATIENT
Start: 2020-09-29 | End: 2020-09-29

## 2020-09-29 RX ORDER — POLYETHYLENE GLYCOL 3350 17 G/17G
17 POWDER, FOR SOLUTION ORAL DAILY PRN
Status: DISCONTINUED | OUTPATIENT
Start: 2020-09-29 | End: 2020-10-03

## 2020-09-29 RX ORDER — IBUPROFEN 200 MG
24 TABLET ORAL
Status: DISCONTINUED | OUTPATIENT
Start: 2020-09-29 | End: 2020-10-15 | Stop reason: HOSPADM

## 2020-09-29 RX ORDER — TALC
6 POWDER (GRAM) TOPICAL NIGHTLY PRN
Status: DISCONTINUED | OUTPATIENT
Start: 2020-09-29 | End: 2020-10-15 | Stop reason: HOSPADM

## 2020-09-29 RX ORDER — CEPHALEXIN 500 MG/1
500 CAPSULE ORAL EVERY 8 HOURS
Qty: 15 CAPSULE | Refills: 0 | Status: SHIPPED | OUTPATIENT
Start: 2020-09-29 | End: 2020-09-29

## 2020-09-29 RX ADMIN — FUROSEMIDE 60 MG: 10 INJECTION, SOLUTION INTRAMUSCULAR; INTRAVENOUS at 02:09

## 2020-09-29 RX ADMIN — GABAPENTIN 100 MG: 100 CAPSULE ORAL at 08:09

## 2020-09-29 RX ADMIN — POTASSIUM CHLORIDE 40 MEQ: 1500 TABLET, EXTENDED RELEASE ORAL at 06:09

## 2020-09-29 RX ADMIN — GABAPENTIN 100 MG: 100 CAPSULE ORAL at 02:09

## 2020-09-29 RX ADMIN — HEPARIN SODIUM 7500 UNITS: 5000 INJECTION INTRAVENOUS; SUBCUTANEOUS at 10:09

## 2020-09-29 RX ADMIN — CEPHALEXIN 500 MG: 500 CAPSULE ORAL at 02:09

## 2020-09-29 RX ADMIN — CEPHALEXIN 500 MG: 500 CAPSULE ORAL at 10:09

## 2020-09-29 RX ADMIN — CARVEDILOL 12.5 MG: 12.5 TABLET, FILM COATED ORAL at 08:09

## 2020-09-29 RX ADMIN — PRAVASTATIN SODIUM 80 MG: 40 TABLET ORAL at 08:09

## 2020-09-29 RX ADMIN — CEPHALEXIN 500 MG: 500 CAPSULE ORAL at 05:09

## 2020-09-29 RX ADMIN — SPIRONOLACTONE 25 MG: 25 TABLET ORAL at 08:09

## 2020-09-29 RX ADMIN — POTASSIUM CHLORIDE 40 MEQ: 1500 TABLET, EXTENDED RELEASE ORAL at 05:09

## 2020-09-29 RX ADMIN — FUROSEMIDE 60 MG: 10 INJECTION, SOLUTION INTRAMUSCULAR; INTRAVENOUS at 08:09

## 2020-09-29 NOTE — NURSING TRANSFER
Nursing Transfer Note      9/28/2020     Transfer to room 348    Transfer via stretcher    Transfer with bipap    Transported by nurse    Chart send with patient: yes    Notified: daughter    Patient reassessed at: 2100

## 2020-09-29 NOTE — RESPIRATORY THERAPY
Rapid Response Respiratory Therapy Proactive Rounding Note      Time of visit: 0740    Code Status: Full Code   : 1955  Bed: 348/348 A:   MRN: [id number]    SITUATION     Evaluated patient for: Non-Invasive Positive Pressure Ventilation Compliance     BACKGROUND     Patient has a past medical history of Anticoagulant long-term use, CHF (congestive heart failure), Hyperlipidemia, Hypertension, NICM (nonischemic cardiomyopathy), and Obesity.    ASSESSMENT/INTERVENTIONS     Upon arrival in room pt on room air and doing well.  I discontinued the BIPAP order.    Pulse:  Respiratory rate:  SpO2:    Level of Consciousness: Level of Consciousness (AVPU): alert  Respiratory Effort: Respiratory Effort: Normal, Unlabored Expansion/Accessory Muscle Usage: Expansion/Accessory Muscles/Retractions: expansion symmetric, no retractions, no use of accessory muscles  All Lung Field Breath Sounds: All Lung Fields Breath Sounds: diminished, clear  O2 Device/Concentration:   Most recent blood gas:   Recent Labs     20   PH 7.316*   PCO2 63.8*   PO2 19*   HCO3 32.6*   POCSATURATED 25*   BE 6     NIPPV: No   Ambu at bedside: Ambu bag with the patient?: Yes, Adult Ambu    Current Respiratory Care Orders:  Oxygen PRN Use PRN (0 of 50449 released)    Release   References: Oxygen Titration Protocol   Question Answer Comment   Device type: Low flow    Device: Nasal Cannula (1- 5 Liters)    LPM: 1-10    Titrate O2 per Oxygen Titration Protocol: Yes    To maintain SpO2 goal of: >= 90%    Notify MD of: Inability to achieve desired SpO2; Sudden change in patient status and requires 20% increase in FiO2; Patient requires >60% FiO2              RECOMMENDATIONS     We recommend:     ESCALATION      Physician Escalation (Yes/No)    Care discussed with:   Discussed plan of care primary RTMarleen. CRT     FOLLOW-UP     Please call back the Rapid Response RTRamila, RRT at x [phone] for any questions or concerns.

## 2020-09-29 NOTE — ASSESSMENT & PLAN NOTE
Non-O2 dependent with need for BiPAP following cardiac resynchronization therapy pacemaker placement on 9/27. Subsequently weaned to RA with diuresis. 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 CXR with pulmonary edema, pleural effusions, and cardiomegaly. Received 2 doses of 60 mg IV lasix since procedure with good urine output.  DDX: Acute heart failure exacerbation 2/2, ACS (less likely), COPD (no history of this but patient does have smoking history), PNA (no fevers, chills)    - BNP  - check troponin to r/o ischemia  - one time lasix 60 IV today - will check lab CMP tomorrow  - repeat TTE  - continue home carvedilol 12.5 BID  - hold ACEi and spironolactone  - can consider HTS consultation

## 2020-09-29 NOTE — PLAN OF CARE
POC EP Brief Note    Mr. Bhakta is s/p CRT-D with Dr. Kwong on 09/27/20 for NICM with EF 10%.  On arrival he appeared volume overloaded, reports that he is +20 lb above dry weight.  He reports KING and worsening BLE edema.  He is prescribed Lasix 80 mg daily, however has been taking Lasix 80 mg bid.     Proceeded with CRT-D implantation without any acute events. Following the procedure he was admitted to OP extended stay for overnight monitoring and diuresis.  Post-procedure, Mr. Bhakta required BiPAP, likely due to anesthesia as well as volume overload.  CXR post-procuedre revealing of pulmonary edema and small bilateral pleural effusions. No evidence of pneumothorax.    He was diuresed with Lasix 60 mg IVP overnight with approximately 1 L UOP.  An additional Lasix 60 mg IVP administered this morning. Lytes and renal function stable.     Tolerated BiPAP wean to room air, however continues to demonstrate symptoms of volume overload.    Plan of care discussed with Dr. Kwong and in agreement with transfer of care and admission to hospital medicine service for further diuresis and management of CHF exacerbation. Covid-19 rapid screen pending.         EP Recommendations:  - HTS consult for CHF management   - Please continue Keflex 500 mg TID x 5 days for surgical prophylaxis (ordered)  - Discharge home with CRT-D Home Monitoring System (at bedside with patient)  - Aquacel dressing to remain in place until 1 week EP clinic follow up (10/5/20)  - Please wear Right arm sling continuously for 48 hours s/p CRT-D implant, then continue to wear only at night for 6 weeks  - Do not lift more than 5-10 lbs with your device-sided arm for 6 weeks  - Do not raise device side arm above your shoulder for 6 weeks               Ana SMALLS, NP-C  Cardiac Electrophysiology

## 2020-09-29 NOTE — ASSESSMENT & PLAN NOTE
Home meds: carvedilol 12.5 BID, ramipiril, aldactone  - continue carvedilol and aldactone  - hold ACEi and aldactone

## 2020-09-29 NOTE — ANESTHESIA POSTPROCEDURE EVALUATION
Anesthesia Post Evaluation    Patient: Papito Bhakta    Procedure(s) Performed: Procedure(s) (LRB):  INSERTION, ICD, BIVENTRICULAR (N/A)                  Comments: Patient did well overnight on BiPAP. Back to baseline this AM. Fully alert, oriented and now off BiPAP.  Neuro intact.          Vitals Value Taken Time   /58 09/28/20 2146   Temp 36.4 °C (97.5 °F) 09/28/20 2142   Pulse 62 09/29/20 0300   Resp 17 09/28/20 2142   SpO2 96 % 09/29/20 0438   Vitals shown include unvalidated device data.      Event Time   Out of Recovery 20:56:00         Pain/Shira Score: Shira Score: 8 (9/28/2020  9:00 PM)

## 2020-09-29 NOTE — HPI
Mr. Bhakta is a 64 yo gentleman with PMH of NICM with AICD and recent R CRT-D placement 9/27, hypertension, HFpEF/HFrEF (EF 25% June 2020), hyperlipidemia, and obesity presenting for shortness of breath and hypoxia. On September 27 he underwent placement of cardiac resynchronization therapy pacemaker with Dr. Kwong. After procedure it was difficult to awaken patient. His pH was 7.26 and CO2 was elevated, and he was subsequently placed on BiPAP then weaned down to room air after diuresis. It was recommended he be admitted for volume overload in the setting of hypoxemia. He has been experiencing increased shortness of breath and lower extremity swelling for the past few months since his bi-V ICD was removed in June 2020 secondary to infection. He reports 2 pillow orthopnea, dyspnea ambulating from his room to the kitchen, and a 20# weight gain. Previously he was more active and able to do household chores and yard work. He has been compliant with his home medications and has been alternating between taking 80 mg of lasix once to twice daily though he was prescribed daily on discharge from the hospital in June. He denies chest pain, fevers, chills, weight loss, nausea, vomiting, diarrhea, melena/hematochezia, and dysuria.     ED/Post-Op Course:  On room air. CXR showed cardiomegaly, pulmonary edema, and pleural effusion. Received 60 mg IV lasix x2 with good urine output and improved respiratory status. CMP revealed increased CO2.

## 2020-09-29 NOTE — NURSING
Called cardio  on call-Cristobal. Informed her pt had a v-tachy rhythm, she stated was more artifact. Ordered stat BMP, Later informed her on pt K+ and Mag levels ordered ordered oral replacement potassium 40MEq

## 2020-09-29 NOTE — SUBJECTIVE & OBJECTIVE
Past Medical History:   Diagnosis Date    Anticoagulant long-term use     Aspirin    CHF (congestive heart failure)     Hyperlipidemia     Hypertension     NICM (nonischemic cardiomyopathy) 6/16/2020    Obesity        Past Surgical History:   Procedure Laterality Date    INSERTION OF BIVENTRICULAR IMPLANTABLE CARDIOVERTER-DEFIBRILLATOR (ICD) N/A 2/13/2019    Procedure: INSERTION, ICD, BIVENTRICULAR;  Surgeon: Shailesh Eng MD;  Location: [facility] CATH LAB;  Service: Cardiology;  Laterality: N/A;  RN PRE OP 2-6-19  1ST CASE PER  RADHA. NOTIFIED RADHA THAT ANESTHESIA IS NOT PITO FOR 1ST CASE START-LO    INSERTION OF BIVENTRICULAR IMPLANTABLE CARDIOVERTER-DEFIBRILLATOR (ICD) N/A 9/28/2020    Procedure: INSERTION, ICD, BIVENTRICULAR;  Surgeon: Jim Kwong MD;  Location: [facility] EP LAB;  Service: Cardiology;  Laterality: N/A;  NICM, CRT-D, SJM,, MAC, DM, 3 Prep*Wearing LifeVest*    oral extraction  11/2018    TESTICLE SURGERY         Review of patient's allergies indicates:  No Known Allergies    No current facility-administered medications on file prior to encounter.      Current Outpatient Medications on File Prior to Encounter   Medication Sig    aspirin 325 MG tablet Take 325 mg by mouth once daily.    carvediloL (COREG) 12.5 MG tablet Take 1 tablet (12.5 mg total) by mouth 2 (two) times daily.    furosemide (LASIX) 80 MG tablet Take 1 tablet (80 mg total) by mouth once daily.    pravastatin (PRAVACHOL) 80 MG tablet Take 1 tablet (80 mg total) by mouth once daily.    spironolactone (ALDACTONE) 25 MG tablet Take 1 tablet (25 mg total) by mouth once daily.    diclofenac sodium (VOLTAREN) 1 % Gel Apply 2 g topically 4 (four) times daily.    doxycycline (VIBRAMYCIN) 100 MG Cap Take 1 capsule (100 mg total) by mouth every 12 (twelve) hours.    gabapentin (NEURONTIN) 100 MG capsule Take 1 capsule (100 mg total) by mouth 3 (three) times daily.    lidocaine (LIDODERM) 5 % Place 1 patch onto the skin once  daily. Remove & Discard patch within 12 hours or as directed by MD (Patient not taking: Reported on 2020)    [DISCONTINUED] ramipril (ALTACE) 10 MG capsule Take 1 capsule (10 mg total) by mouth once daily.     Family History     Problem Relation (Age of Onset)    Epilepsy Mother        Tobacco Use    Smoking status: Former Smoker     Packs/day: 0.50     Years: 40.00     Pack years: 20.00     Types: Cigarettes, Cigars     Quit date:      Years since quittin.7    Smokeless tobacco: Never Used   Substance and Sexual Activity    Alcohol use: Yes     Comment: once a month    Drug use: No    Sexual activity: Yes     Birth control/protection: None     Comment: uses protection sometimes     Review of Systems   Constitutional: Positive for activity change and unexpected weight change (weight gain ). Negative for chills, diaphoresis, fatigue and fever.   HENT: Negative for facial swelling and trouble swallowing.    Eyes: Negative for visual disturbance.   Respiratory: Positive for cough and shortness of breath. Negative for apnea, choking, chest tightness and wheezing.    Cardiovascular: Positive for leg swelling. Negative for chest pain and palpitations.   Gastrointestinal: Positive for abdominal distention. Negative for abdominal pain, blood in stool, constipation, diarrhea, nausea and vomiting.   Endocrine: Negative for cold intolerance, heat intolerance and polyuria.   Genitourinary: Negative for dysuria and hematuria.   Musculoskeletal: Negative for back pain and myalgias.   Skin: Negative for color change, pallor and wound.   Neurological: Negative for dizziness, tremors and weakness.   Hematological: Negative for adenopathy.   Psychiatric/Behavioral: Negative for agitation.     Objective:     Vital Signs (Most Recent):  Temp: 97.5 °F (36.4 °C) (20 1421)  Pulse: 70 (20 1512)  Resp: 18 (20 1421)  BP: 119/63 (20 1421)  SpO2: 95 % (20 1421) Vital Signs (24h Range):  Temp:   [97.5 °F (36.4 °C)-99 °F (37.2 °C)] 97.5 °F (36.4 °C)  Pulse:  [53-84] 70  Resp:  [6-32] 18  SpO2:  [92 %-100 %] 95 %  BP: (106-143)/(59-91) 119/63     Weight: (!) 159.4 kg (351 lb 6.6 oz)  Body mass index is 41.67 kg/m².    Physical Exam  Vitals signs and nursing note reviewed.   Constitutional:       Appearance: Normal appearance. He is obese. He is not ill-appearing, toxic-appearing or diaphoretic.      Comments: Appears mildly uncomfortable   HENT:      Head: Normocephalic and atraumatic.      Mouth/Throat:      Mouth: Mucous membranes are moist.      Pharynx: No oropharyngeal exudate.   Eyes:      General: No scleral icterus.     Extraocular Movements: Extraocular movements intact.      Conjunctiva/sclera: Conjunctivae normal.      Pupils: Pupils are equal, round, and reactive to light.   Neck:      Musculoskeletal: Normal range of motion and neck supple.   Cardiovascular:      Rate and Rhythm: Normal rate and regular rhythm.      Pulses: Normal pulses.      Heart sounds: No murmur.   Pulmonary:      Comments: On room air  Increased effort  Talking in complete sentences, no accessory muscle use  Course bilateral breath sounds      Chest:      Chest wall: No tenderness.   Abdominal:      General: Abdomen is flat. Bowel sounds are normal. There is distension.      Tenderness: There is no abdominal tenderness. There is no guarding or rebound.   Musculoskeletal:      Right lower leg: Edema present.      Left lower leg: Edema present.      Comments: 2+ pitting edema bilaterally   Lymphadenopathy:      Cervical: No cervical adenopathy.   Skin:     General: Skin is warm and dry.      Capillary Refill: Capillary refill takes less than 2 seconds.   Neurological:      General: No focal deficit present.      Mental Status: He is alert and oriented to person, place, and time. Mental status is at baseline.   Psychiatric:         Mood and Affect: Mood normal.           CRANIAL NERVES     CN III, IV, VI   Pupils are equal,  round, and reactive to light.       Significant Labs:   CBC:   Recent Labs   Lab 09/28/20  1932   HCT 37     CMP:   Recent Labs   Lab 09/29/20  0241      K 3.6      CO2 32*   GLU 66*   BUN 18   CREATININE 1.2   CALCIUM 8.7   ANIONGAP 10   EGFRNONAA >60.0       Significant Imaging: CXR: I have reviewed all pertinent results/findings within the past 24 hours and my personal findings are:  pleural effusion, cardiomegaly, congestion

## 2020-09-29 NOTE — H&P
[facility] Medicine  History & Physical    Patient Name: Papito Bhakta  MRN: [id number]  Admission Date: 9/28/2020  Attending Physician: Hamlet Morales MD   Primary Care Provider: Brynn To MD    [facility] Medicine Team: [facility] MED 3 Jennifer Ledezma MD     Patient information was obtained from patient, past medical records and ER records.     Subjective:     Principal Problem:Acute hypoxemic respiratory failure    Chief Complaint:   Chief Complaint   Patient presents with    Shortness of Breath        HPI: Mr. Bhakta is a 66 yo gentleman with PMH of NICM with AICD and recent R CRT-D placement 9/27, hypertension, HFpEF/HFrEF (EF 25% June 2020), hyperlipidemia, and obesity presenting for shortness of breath and hypoxia. On September 27 he underwent placement of cardiac resynchronization therapy pacemaker with Dr. Kwong. After procedure it was difficult to awaken patient. His pH was 7.26 and CO2 was elevated, and he was subsequently placed on BiPAP then weaned down to room air after diuresis. It was recommended he be admitted for volume overload in the setting of hypoxemia. He has been experiencing increased shortness of breath and lower extremity swelling for the past few months since his bi-V ICD was removed in June 2020 secondary to infection. He reports 2 pillow orthopnea, dyspnea ambulating from his room to the kitchen, and a 20# weight gain. Previously he was more active and able to do household chores and yard work. He has been compliant with his home medications and has been alternating between taking 80 mg of lasix once to twice daily though he was prescribed daily on discharge from the hospital in June. He denies chest pain, fevers, chills, weight loss, nausea, vomiting, diarrhea, melena/hematochezia, and dysuria.     ED/Post-Op Course:  On room air. CXR showed cardiomegaly, pulmonary edema, and pleural effusion. Received 60 mg IV lasix x2 with good urine output and  improved respiratory status. CMP revealed increased CO2.     Past Medical History:   Diagnosis Date    Anticoagulant long-term use     Aspirin    CHF (congestive heart failure)     Hyperlipidemia     Hypertension     NICM (nonischemic cardiomyopathy) 6/16/2020    Obesity        Past Surgical History:   Procedure Laterality Date    INSERTION OF BIVENTRICULAR IMPLANTABLE CARDIOVERTER-DEFIBRILLATOR (ICD) N/A 2/13/2019    Procedure: INSERTION, ICD, BIVENTRICULAR;  Surgeon: Shailesh Eng MD;  Location: [facility] CATH LAB;  Service: Cardiology;  Laterality: N/A;  RN PRE OP 2-6-19  1ST CASE PER  RADHA. NOTIFIED RADHA THAT ANESTHESIA IS NOT PITO FOR 1ST CASE START-LO    INSERTION OF BIVENTRICULAR IMPLANTABLE CARDIOVERTER-DEFIBRILLATOR (ICD) N/A 9/28/2020    Procedure: INSERTION, ICD, BIVENTRICULAR;  Surgeon: Jim Kwong MD;  Location: [facility] EP LAB;  Service: Cardiology;  Laterality: N/A;  NICM, CRT-D, SJM,, MAC, DM, 3 Prep*Wearing LifeVest*    oral extraction  11/2018    TESTICLE SURGERY         Review of patient's allergies indicates:  No Known Allergies    No current facility-administered medications on file prior to encounter.      Current Outpatient Medications on File Prior to Encounter   Medication Sig    aspirin 325 MG tablet Take 325 mg by mouth once daily.    carvediloL (COREG) 12.5 MG tablet Take 1 tablet (12.5 mg total) by mouth 2 (two) times daily.    furosemide (LASIX) 80 MG tablet Take 1 tablet (80 mg total) by mouth once daily.    pravastatin (PRAVACHOL) 80 MG tablet Take 1 tablet (80 mg total) by mouth once daily.    spironolactone (ALDACTONE) 25 MG tablet Take 1 tablet (25 mg total) by mouth once daily.    diclofenac sodium (VOLTAREN) 1 % Gel Apply 2 g topically 4 (four) times daily.    doxycycline (VIBRAMYCIN) 100 MG Cap Take 1 capsule (100 mg total) by mouth every 12 (twelve) hours.    gabapentin (NEURONTIN) 100 MG capsule Take 1 capsule (100 mg total) by mouth 3 (three) times daily.     lidocaine (LIDODERM) 5 % Place 1 patch onto the skin once daily. Remove & Discard patch within 12 hours or as directed by MD (Patient not taking: Reported on 2020)    [DISCONTINUED] ramipril (ALTACE) 10 MG capsule Take 1 capsule (10 mg total) by mouth once daily.     Family History     Problem Relation (Age of Onset)    Epilepsy Mother        Tobacco Use    Smoking status: Former Smoker     Packs/day: 0.50     Years: 40.00     Pack years: 20.00     Types: Cigarettes, Cigars     Quit date:      Years since quittin.7    Smokeless tobacco: Never Used   Substance and Sexual Activity    Alcohol use: Yes     Comment: once a month    Drug use: No    Sexual activity: Yes     Birth control/protection: None     Comment: uses protection sometimes     Review of Systems   Constitutional: Positive for activity change and unexpected weight change (weight gain ). Negative for chills, diaphoresis, fatigue and fever.   HENT: Negative for facial swelling and trouble swallowing.    Eyes: Negative for visual disturbance.   Respiratory: Positive for cough and shortness of breath. Negative for apnea, choking, chest tightness and wheezing.    Cardiovascular: Positive for leg swelling. Negative for chest pain and palpitations.   Gastrointestinal: Positive for abdominal distention. Negative for abdominal pain, blood in stool, constipation, diarrhea, nausea and vomiting.   Endocrine: Negative for cold intolerance, heat intolerance and polyuria.   Genitourinary: Negative for dysuria and hematuria.   Musculoskeletal: Negative for back pain and myalgias.   Skin: Negative for color change, pallor and wound.   Neurological: Negative for dizziness, tremors and weakness.   Hematological: Negative for adenopathy.   Psychiatric/Behavioral: Negative for agitation.     Objective:     Vital Signs (Most Recent):  Temp: 97.5 °F (36.4 °C) (20 1421)  Pulse: 70 (20 1512)  Resp: 18 (20 1421)  BP: 119/63 (20  1421)  SpO2: 95 % (09/29/20 1421) Vital Signs (24h Range):  Temp:  [97.5 °F (36.4 °C)-99 °F (37.2 °C)] 97.5 °F (36.4 °C)  Pulse:  [53-84] 70  Resp:  [6-32] 18  SpO2:  [92 %-100 %] 95 %  BP: (106-143)/(59-91) 119/63     Weight: (!) 159.4 kg (351 lb 6.6 oz)  Body mass index is 41.67 kg/m².    Physical Exam  Vitals signs and nursing note reviewed.   Constitutional:       Appearance: Normal appearance. He is obese. He is not ill-appearing, toxic-appearing or diaphoretic.      Comments: Appears mildly uncomfortable   HENT:      Head: Normocephalic and atraumatic.      Mouth/Throat:      Mouth: Mucous membranes are moist.      Pharynx: No oropharyngeal exudate.   Eyes:      General: No scleral icterus.     Extraocular Movements: Extraocular movements intact.      Conjunctiva/sclera: Conjunctivae normal.      Pupils: Pupils are equal, round, and reactive to light.   Neck:      Musculoskeletal: Normal range of motion and neck supple.   Cardiovascular:      Rate and Rhythm: Normal rate and regular rhythm.      Pulses: Normal pulses.      Heart sounds: No murmur.   Pulmonary:      Comments: On room air  Increased effort  Talking in complete sentences, no accessory muscle use  Course bilateral breath sounds      Chest:      Chest wall: No tenderness.   Abdominal:      General: Abdomen is flat. Bowel sounds are normal. There is distension.      Tenderness: There is no abdominal tenderness. There is no guarding or rebound.   Musculoskeletal:      Right lower leg: Edema present.      Left lower leg: Edema present.      Comments: 2+ pitting edema bilaterally   Lymphadenopathy:      Cervical: No cervical adenopathy.   Skin:     General: Skin is warm and dry.      Capillary Refill: Capillary refill takes less than 2 seconds.   Neurological:      General: No focal deficit present.      Mental Status: He is alert and oriented to person, place, and time. Mental status is at baseline.   Psychiatric:         Mood and Affect: Mood normal.            CRANIAL NERVES     CN III, IV, VI   Pupils are equal, round, and reactive to light.       Significant Labs:   CBC:   Recent Labs   Lab 09/28/20  1932   HCT 37     CMP:   Recent Labs   Lab 09/29/20  0241      K 3.6      CO2 32*   GLU 66*   BUN 18   CREATININE 1.2   CALCIUM 8.7   ANIONGAP 10   EGFRNONAA >60.0       Significant Imaging: CXR: I have reviewed all pertinent results/findings within the past 24 hours and my personal findings are:  pleural effusion, cardiomegaly, congestion    Assessment/Plan:     * Acute hypoxemic respiratory failure  Non-O2 dependent with need for BiPAP following cardiac resynchronization therapy pacemaker placement on 9/27. Subsequently weaned to RA with diuresis. 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 CXR with pulmonary edema, pleural effusions, and cardiomegaly. Received 2 doses of 60 mg IV lasix since procedure with good urine output.  DDX: Acute heart failure exacerbation 2/2, ACS (less likely), COPD (no history of this but patient does have smoking history), PNA (no fevers, chills)    - BNP  - check troponin to r/o ischemia  - one time lasix 60 IV today - will check lab CMP tomorrow  - repeat TTE  - continue home carvedilol 12.5 BID  - hold ACEi and spironolactone  - can consider HTS consultation      Chronic combined systolic and diastolic congestive heart failure  History of combined systolic and diastolic dysfunction, s/p CRT-D 9/27 with continued volume overload. Has AICD. Compliant with home medications.    - Please see Acute Hypoxemic Respiratory Failure      Essential hypertension  Home meds: carvedilol 12.5 BID, ramipiril, aldactone  - continue carvedilol and aldactone  - hold ACEi and aldactone      Hyperlipidemia  Continue home pravastatin    NICM (nonischemic cardiomyopathy)  Please see Acute Hypoxemic Respiratory Failure      Biventricular ICD (implantable  cardioverter-defibrillator) in place  Placed 9/27 with Dr. Kwong.    - Keflex 500 TID for 5 days (end date 10/1)      VTE Risk Mitigation (From admission, onward)         Ordered     heparin (porcine) injection 7,500 Units  Every 8 hours      09/29/20 1417     IP VTE HIGH RISK PATIENT  Once      09/29/20 1417     Place sequential compression device  Until discontinued      09/29/20 1417                   Jennifer Ledezma MD  Department of Hospital Medicine   [facility]

## 2020-09-29 NOTE — HOSPITAL COURSE
Mr. Bhakta underwent right-sdied CRT-D implantation.  He tolerated the procedure well and there were no acute complications.   Right pectoral site CDI.  No bleeding or hematoma noted. Aquacel and pressure dressings in place.  Right arm sling in place.   Post-procedure CXR stable without evidence of pneumothorax.  ECG and tele reviewed without evidence of arrhythmias.  Completed intra op abx, plan to discharge home on Keflex 500 mg TID x 5 days for surgical prophylaxis.

## 2020-09-29 NOTE — ASSESSMENT & PLAN NOTE
History of combined systolic and diastolic dysfunction, s/p CRT-D 9/27 with continued volume overload. Has AICD. Compliant with home medications.    - Please see Acute Hypoxemic Respiratory Failure

## 2020-09-29 NOTE — PLAN OF CARE
09/29/20 1251   Discharge Assessment   Assessment Type Discharge Planning Assessment   Confirmed/corrected address and phone number on facesheet? Yes   Assessment information obtained from? Patient;Caregiver;Medical Record   Expected Length of Stay (days) 3   Communicated expected length of stay with patient/caregiver yes   Prior to hospitilization cognitive status: Alert/Oriented   Prior to hospitalization functional status: Independent;Assistive Equipment   Current cognitive status: Alert/Oriented   Current Functional Status: Independent;Assistive Equipment   Lives With child(sourav), adult;grandchild(sourav)   Able to Return to Prior Arrangements yes   Is patient able to care for self after discharge? Yes   Who are your caregiver(s) and their phone number(s)? Annie crooks  [phone]   Patient's perception of discharge disposition home or selfcare;home health   Readmission Within the Last 30 Days no previous admission in last 30 days   Patient currently being followed by outpatient case management? No   Patient currently receives any other outside agency services? No   Equipment Currently Used at Home walker, rolling;raised toilet;wheelchair   Do you have any problems affording any of your prescribed medications? TBD   Is the patient taking medications as prescribed? yes   Does the patient have transportation home? Yes   Transportation Anticipated family or friend will provide   Does the patient receive services at the [facility]mad[facility] Clinic? No   Discharge Plan A Home with family;Home Health   DME Needed Upon Discharge  other (see comments)  (TBD)   Placed in Obs for CHF post CRT-D implant. Lives with daughter and grandchild and is independent in his ADLs. Daughter is available if/when needs arise. Plan is to DC home. Last DC was sent home with [facility] and would like to use them again if needed.

## 2020-09-29 NOTE — ANESTHESIA POSTPROCEDURE EVALUATION
Anesthesia Post Evaluation    Patient: Papito Bhakta    Procedure(s) Performed: Procedure(s) (LRB):  INSERTION, ICD, BIVENTRICULAR (N/A)    Final Anesthesia Type: general    Patient location: Cardiac stepdown.  Patient participation: Yes- Able to Participate  Post-procedure mental status: Easily arousable to voice.  Post-procedure vital signs: reviewed and stable  Pain management: adequate  Airway patency: patent    PONV status at discharge: No PONV  Anesthetic complications: yes  Perioperative Events: prolonged PACU stay - patient condition      Tamar-operative Events Comments: I assumed care of patient at 1830 from MAGDA Davila. Per his report, pt responded to stimulation in the procedure room post op but was obtunded and minimally responsive upon arrival to PACU. ABG drawn prior to my arrival was 7.21/67. On my initial assessment at 1830, pt on BiPAP 15/5, minimally responsive and without grimace to aggressive jaw thrust. ABG recheck at 1730 incrementally improved with ph 7.28/PCO2 65. BiPAP increased to 18/8. CXR with evidence of pulmonary edema. 2100 Lasix dose  (60mg IV) given early at 2000 with improvement in pt's clinical status. Pt now easily arousable to voice, following commands, MAEW. VBG recheck at 2030 again with incremental improvement, pH 7.31/PCO2 63. Given patient's clinical improvement and dispo to cardiac stepdown unit, will transfer to step down in BiPAP. NAN [place]   Cardiovascular status: blood pressure returned to baseline  Respiratory status: BIPAP  Follow-up not needed.          Vitals Value Taken Time   /92 09/28/20 2031   Temp 36.4 °C (97.5 °F) 09/28/20 1900   Pulse 60 09/28/20 2042   Resp 20 09/28/20 2042   SpO2 74 % 09/28/20 2037   Vitals shown include unvalidated device data.      No case tracking events are documented in the log.      Pain/Shira Score: Shira Score: 8 (9/28/2020  7:00 PM)

## 2020-09-30 ENCOUNTER — DOCUMENT SCAN (OUTPATIENT)
Dept: HOME HEALTH SERVICES | Facility: HOSPITAL | Age: 65
End: 2020-09-30
Payer: MEDICARE

## 2020-09-30 PROBLEM — I50.43 ACUTE ON CHRONIC COMBINED SYSTOLIC AND DIASTOLIC HEART FAILURE: Status: ACTIVE | Noted: 2020-09-30

## 2020-09-30 LAB
ALBUMIN SERPL BCP-MCNC: 2.7 G/DL (ref 3.5–5.2)
ALP SERPL-CCNC: 81 U/L (ref 55–135)
ALT SERPL W/O P-5'-P-CCNC: 27 U/L (ref 10–44)
ANION GAP SERPL CALC-SCNC: 11 MMOL/L (ref 8–16)
ANION GAP SERPL CALC-SCNC: 7 MMOL/L (ref 8–16)
ASCENDING AORTA: 3.54 CM
AST SERPL-CCNC: 29 U/L (ref 10–40)
AV INDEX (PROSTH): 0.75
AV MEAN GRADIENT: 5 MMHG
AV PEAK GRADIENT: 10 MMHG
AV VALVE AREA: 3.82 CM2
AV VELOCITY RATIO: 0.73
BASOPHILS # BLD AUTO: 0.03 K/UL (ref 0–0.2)
BASOPHILS NFR BLD: 0.4 % (ref 0–1.9)
BILIRUB SERPL-MCNC: 1.1 MG/DL (ref 0.1–1)
BSA FOR ECHO PROCEDURE: 2.94 M2
BUN SERPL-MCNC: 16 MG/DL (ref 8–23)
BUN SERPL-MCNC: 17 MG/DL (ref 8–23)
CALCIUM SERPL-MCNC: 8.3 MG/DL (ref 8.7–10.5)
CALCIUM SERPL-MCNC: 8.7 MG/DL (ref 8.7–10.5)
CHLORIDE SERPL-SCNC: 100 MMOL/L (ref 95–110)
CHLORIDE SERPL-SCNC: 100 MMOL/L (ref 95–110)
CO2 SERPL-SCNC: 31 MMOL/L (ref 23–29)
CO2 SERPL-SCNC: 35 MMOL/L (ref 23–29)
CREAT SERPL-MCNC: 1.1 MG/DL (ref 0.5–1.4)
CREAT SERPL-MCNC: 1.2 MG/DL (ref 0.5–1.4)
CV ECHO LV RWT: 0.32 CM
DIFFERENTIAL METHOD: ABNORMAL
DOP CALC AO PEAK VEL: 1.55 M/S
DOP CALC AO VTI: 27.15 CM
DOP CALC LVOT AREA: 5.1 CM2
DOP CALC LVOT DIAMETER: 2.55 CM
DOP CALC LVOT PEAK VEL: 1.13 M/S
DOP CALC LVOT STROKE VOLUME: 103.77 CM3
DOP CALCLVOT PEAK VEL VTI: 20.33 CM
E WAVE DECELERATION TIME: 177.18 MSEC
E/A RATIO: 2.23
E/E' RATIO: 19.43 M/S
ECHO LV POSTERIOR WALL: 1.23 CM (ref 0.6–1.1)
EOSINOPHIL # BLD AUTO: 0.1 K/UL (ref 0–0.5)
EOSINOPHIL NFR BLD: 1.5 % (ref 0–8)
ERYTHROCYTE [DISTWIDTH] IN BLOOD BY AUTOMATED COUNT: 15.8 % (ref 11.5–14.5)
EST. GFR  (AFRICAN AMERICAN): >60 ML/MIN/1.73 M^2
EST. GFR  (AFRICAN AMERICAN): >60 ML/MIN/1.73 M^2
EST. GFR  (NON AFRICAN AMERICAN): >60 ML/MIN/1.73 M^2
EST. GFR  (NON AFRICAN AMERICAN): >60 ML/MIN/1.73 M^2
FRACTIONAL SHORTENING: 4 % (ref 28–44)
GLUCOSE SERPL-MCNC: 101 MG/DL (ref 70–110)
GLUCOSE SERPL-MCNC: 87 MG/DL (ref 70–110)
HCT VFR BLD AUTO: 34.3 % (ref 40–54)
HGB BLD-MCNC: 10 G/DL (ref 14–18)
IMM GRANULOCYTES # BLD AUTO: 0.01 K/UL (ref 0–0.04)
IMM GRANULOCYTES NFR BLD AUTO: 0.1 % (ref 0–0.5)
INTERVENTRICULAR SEPTUM: 1.02 CM (ref 0.6–1.1)
LA MAJOR: 7.53 CM
LA MINOR: 7.51 CM
LA WIDTH: 5.8 CM
LEFT ATRIUM SIZE: 4.98 CM
LEFT ATRIUM VOLUME INDEX: 65 ML/M2
LEFT ATRIUM VOLUME: 184.63 CM3
LEFT INTERNAL DIMENSION IN SYSTOLE: 7.41 CM (ref 2.1–4)
LEFT VENTRICLE DIASTOLIC VOLUME INDEX: 111.62 ML/M2
LEFT VENTRICLE DIASTOLIC VOLUME: 317.08 ML
LEFT VENTRICLE MASS INDEX: 155 G/M2
LEFT VENTRICLE SYSTOLIC VOLUME INDEX: 102.2 ML/M2
LEFT VENTRICLE SYSTOLIC VOLUME: 290.45 ML
LEFT VENTRICULAR INTERNAL DIMENSION IN DIASTOLE: 7.71 CM (ref 3.5–6)
LEFT VENTRICULAR MASS: 441.34 G
LV LATERAL E/E' RATIO: 17 M/S
LV SEPTAL E/E' RATIO: 22.67 M/S
LYMPHOCYTES # BLD AUTO: 1.5 K/UL (ref 1–4.8)
LYMPHOCYTES NFR BLD: 21.4 % (ref 18–48)
MAGNESIUM SERPL-MCNC: 1.8 MG/DL (ref 1.6–2.6)
MAGNESIUM SERPL-MCNC: 2 MG/DL (ref 1.6–2.6)
MCH RBC QN AUTO: 23.4 PG (ref 27–31)
MCHC RBC AUTO-ENTMCNC: 29.2 G/DL (ref 32–36)
MCV RBC AUTO: 80 FL (ref 82–98)
MONOCYTES # BLD AUTO: 1.1 K/UL (ref 0.3–1)
MONOCYTES NFR BLD: 16.6 % (ref 4–15)
MV PEAK A VEL: 0.61 M/S
MV PEAK E VEL: 1.36 M/S
MV STENOSIS PRESSURE HALF TIME: 51.38 MS
MV VALVE AREA P 1/2 METHOD: 4.28 CM2
NEUTROPHILS # BLD AUTO: 4.1 K/UL (ref 1.8–7.7)
NEUTROPHILS NFR BLD: 60 % (ref 38–73)
NRBC BLD-RTO: 0 /100 WBC
PHOSPHATE SERPL-MCNC: 3.6 MG/DL (ref 2.7–4.5)
PISA TR MAX VEL: 3.5 M/S
PLATELET # BLD AUTO: 227 K/UL (ref 150–350)
PMV BLD AUTO: 11.6 FL (ref 9.2–12.9)
POTASSIUM SERPL-SCNC: 3.8 MMOL/L (ref 3.5–5.1)
POTASSIUM SERPL-SCNC: 4 MMOL/L (ref 3.5–5.1)
PROT SERPL-MCNC: 6.7 G/DL (ref 6–8.4)
RA MAJOR: 6.64 CM
RA WIDTH: 4.95 CM
RBC # BLD AUTO: 4.27 M/UL (ref 4.6–6.2)
RIGHT VENTRICULAR END-DIASTOLIC DIMENSION: 5.29 CM
RV TISSUE DOPPLER FREE WALL SYSTOLIC VELOCITY 1 (APICAL 4 CHAMBER VIEW): 9.94 CM/S
SINUS: 3.59 CM
SODIUM SERPL-SCNC: 142 MMOL/L (ref 136–145)
SODIUM SERPL-SCNC: 142 MMOL/L (ref 136–145)
STJ: 3.29 CM
TDI LATERAL: 0.08 M/S
TDI SEPTAL: 0.06 M/S
TDI: 0.07 M/S
TR MAX PG: 49 MMHG
TRICUSPID ANNULAR PLANE SYSTOLIC EXCURSION: 1.91 CM
WBC # BLD AUTO: 6.86 K/UL (ref 3.9–12.7)

## 2020-09-30 PROCEDURE — 25000003 PHARM REV CODE 250: Mod: HCNC | Performed by: NURSE PRACTITIONER

## 2020-09-30 PROCEDURE — 96372 THER/PROPH/DIAG INJ SC/IM: CPT

## 2020-09-30 PROCEDURE — 99232 PR SUBSEQUENT HOSPITAL CARE,LEVL II: ICD-10-PCS | Mod: HCNC,GC,, | Performed by: INTERNAL MEDICINE

## 2020-09-30 PROCEDURE — 80053 COMPREHEN METABOLIC PANEL: CPT | Mod: HCNC

## 2020-09-30 PROCEDURE — 63600175 PHARM REV CODE 636 W HCPCS: Mod: HCNC | Performed by: INTERNAL MEDICINE

## 2020-09-30 PROCEDURE — 25000003 PHARM REV CODE 250: Mod: HCNC | Performed by: STUDENT IN AN ORGANIZED HEALTH CARE EDUCATION/TRAINING PROGRAM

## 2020-09-30 PROCEDURE — 85025 COMPLETE CBC W/AUTO DIFF WBC: CPT | Mod: HCNC

## 2020-09-30 PROCEDURE — 99232 SBSQ HOSP IP/OBS MODERATE 35: CPT | Mod: HCNC,GC,, | Performed by: INTERNAL MEDICINE

## 2020-09-30 PROCEDURE — 84100 ASSAY OF PHOSPHORUS: CPT | Mod: HCNC

## 2020-09-30 PROCEDURE — 36415 COLL VENOUS BLD VENIPUNCTURE: CPT | Mod: HCNC

## 2020-09-30 PROCEDURE — 83735 ASSAY OF MAGNESIUM: CPT | Mod: 91,HCNC

## 2020-09-30 PROCEDURE — 27000221 HC OXYGEN, UP TO 24 HOURS: Mod: HCNC

## 2020-09-30 PROCEDURE — 63600175 PHARM REV CODE 636 W HCPCS: Mod: HCNC | Performed by: STUDENT IN AN ORGANIZED HEALTH CARE EDUCATION/TRAINING PROGRAM

## 2020-09-30 PROCEDURE — 80048 BASIC METABOLIC PNL TOTAL CA: CPT | Mod: HCNC

## 2020-09-30 PROCEDURE — 99900035 HC TECH TIME PER 15 MIN (STAT): Mod: HCNC

## 2020-09-30 PROCEDURE — 20600001 HC STEP DOWN PRIVATE ROOM: Mod: HCNC

## 2020-09-30 PROCEDURE — 94761 N-INVAS EAR/PLS OXIMETRY MLT: CPT | Mod: HCNC

## 2020-09-30 PROCEDURE — 94660 CPAP INITIATION&MGMT: CPT | Mod: HCNC

## 2020-09-30 RX ORDER — FUROSEMIDE 10 MG/ML
60 INJECTION INTRAMUSCULAR; INTRAVENOUS
Status: DISCONTINUED | OUTPATIENT
Start: 2020-09-30 | End: 2020-09-30

## 2020-09-30 RX ORDER — FUROSEMIDE 10 MG/ML
80 INJECTION INTRAMUSCULAR; INTRAVENOUS
Status: DISCONTINUED | OUTPATIENT
Start: 2020-09-30 | End: 2020-09-30

## 2020-09-30 RX ORDER — MAGNESIUM SULFATE HEPTAHYDRATE 40 MG/ML
2 INJECTION, SOLUTION INTRAVENOUS ONCE
Status: COMPLETED | OUTPATIENT
Start: 2020-09-30 | End: 2020-09-30

## 2020-09-30 RX ORDER — IBUPROFEN 200 MG
200 TABLET ORAL NIGHTLY PRN
Status: ON HOLD | COMMUNITY
End: 2020-10-11 | Stop reason: SDUPTHER

## 2020-09-30 RX ORDER — POTASSIUM CHLORIDE 20 MEQ/1
40 TABLET, EXTENDED RELEASE ORAL ONCE
Status: COMPLETED | OUTPATIENT
Start: 2020-09-30 | End: 2020-09-30

## 2020-09-30 RX ORDER — FUROSEMIDE 10 MG/ML
80 INJECTION INTRAMUSCULAR; INTRAVENOUS
Status: DISCONTINUED | OUTPATIENT
Start: 2020-09-30 | End: 2020-10-01

## 2020-09-30 RX ADMIN — GABAPENTIN 100 MG: 100 CAPSULE ORAL at 09:09

## 2020-09-30 RX ADMIN — POTASSIUM CHLORIDE 40 MEQ: 1500 TABLET, EXTENDED RELEASE ORAL at 10:09

## 2020-09-30 RX ADMIN — CEPHALEXIN 500 MG: 500 CAPSULE ORAL at 03:09

## 2020-09-30 RX ADMIN — HEPARIN SODIUM 7500 UNITS: 5000 INJECTION INTRAVENOUS; SUBCUTANEOUS at 09:09

## 2020-09-30 RX ADMIN — HUMAN ALBUMIN MICROSPHERES AND PERFLUTREN 0.66 MG: 10; .22 INJECTION, SOLUTION INTRAVENOUS at 09:09

## 2020-09-30 RX ADMIN — GABAPENTIN 100 MG: 100 CAPSULE ORAL at 03:09

## 2020-09-30 RX ADMIN — CEPHALEXIN 500 MG: 500 CAPSULE ORAL at 09:09

## 2020-09-30 RX ADMIN — FUROSEMIDE 80 MG: 10 INJECTION, SOLUTION INTRAVENOUS at 09:09

## 2020-09-30 RX ADMIN — HEPARIN SODIUM 7500 UNITS: 5000 INJECTION INTRAVENOUS; SUBCUTANEOUS at 03:09

## 2020-09-30 RX ADMIN — CARVEDILOL 12.5 MG: 12.5 TABLET, FILM COATED ORAL at 09:09

## 2020-09-30 RX ADMIN — GABAPENTIN 100 MG: 100 CAPSULE ORAL at 10:09

## 2020-09-30 RX ADMIN — PRAVASTATIN SODIUM 80 MG: 40 TABLET ORAL at 10:09

## 2020-09-30 RX ADMIN — MAGNESIUM SULFATE 2 G: 2 INJECTION INTRAVENOUS at 11:09

## 2020-09-30 RX ADMIN — CARVEDILOL 12.5 MG: 12.5 TABLET, FILM COATED ORAL at 10:09

## 2020-09-30 RX ADMIN — FUROSEMIDE 80 MG: 10 INJECTION, SOLUTION INTRAVENOUS at 10:09

## 2020-09-30 RX ADMIN — CEPHALEXIN 500 MG: 500 CAPSULE ORAL at 05:09

## 2020-09-30 RX ADMIN — HEPARIN SODIUM 7500 UNITS: 5000 INJECTION INTRAVENOUS; SUBCUTANEOUS at 05:09

## 2020-09-30 NOTE — HPI
64 y/o male with NICM, EF 18%, HTN, HLP, S/P Bi-V ICD(initially placed 02/13/2019-->removed 6/2020 due to infection-->re-implanted 9/28), who is admitted to Medicine for ADHF after his ICD re-implantation. Last RHC/LHC in 2018 with normal coronaries, PW: (7), PA: 42, CO THERM: 7.51, RA:  (1), LVEDP: 15. Echo today with EF 18%, Mod MR, Mild-Mod TR, LVIDD 7.17, TAPSE 1.91.  He states that he has had the diagnosis of heart failure since 1995. Currently volume overloaded on exam and states that he has been this way for a few months. He states that his legs have been edematous since his first ICD was removed. He has been admitted for HF exacerbation at least once in the last year. Currently describes NYHA class IV symptoms. Unable to lie flat comfortably. Denies CP, palpitations, syncope, presyncope, fevers, n/v/d.

## 2020-09-30 NOTE — ASSESSMENT & PLAN NOTE
Home meds: carvedilol 12.5 BID, ramipiril, aldactone  - continue carvedilol  - entresto   - hold aldactone

## 2020-09-30 NOTE — CONSULTS
[facility]  Heart Transplant  Consult Note    Patient Name: Papito Bhakta  MRN: [id number]  Admission Date: 9/28/2020  Hospital Length of Stay: 0 days  Attending Physician: Hamlet Morales MD  Primary Care Provider: Brynn To MD   Principal Problem:Acute on chronic combined systolic and diastolic heart failure    Consults  Subjective:     History of Present Illness:  64 y/o male with NICM, EF 18%, HTN, HLP, S/P Bi-V ICD(initially placed 02/13/2019-->removed 6/2020 due to infection-->re-implanted 9/28), who is admitted to Medicine for ADHF after his ICD re-implantation. Last RHC/LHC in 2018 with normal coronaries, PW: (7), PA: 42, CO THERM: 7.51, RA:  (1), LVEDP: 15. Echo today with EF 18%, Mod MR, Mild-Mod TR, LVIDD 7.17, TAPSE 1.91.  He states that he has had the diagnosis of heart failure since 1995. Currently volume overloaded on exam and states that he has been this way for a few months. He states that his legs have been edematous since his first ICD was removed. He has been admitted for HF exacerbation at least once in the last year. Currently describes NYHA class IV symptoms. Unable to lie flat comfortably. Denies CP, palpitations, syncope, presyncope, fevers, n/v/d.      Past Medical History:   Diagnosis Date    Anticoagulant long-term use     Aspirin    CHF (congestive heart failure)     Hyperlipidemia     Hypertension     NICM (nonischemic cardiomyopathy) 6/16/2020    Obesity      Past Surgical History:   Procedure Laterality Date    INSERTION OF BIVENTRICULAR IMPLANTABLE CARDIOVERTER-DEFIBRILLATOR (ICD) N/A 2/13/2019    Procedure: INSERTION, ICD, BIVENTRICULAR;  Surgeon: Shailesh Eng MD;  Location: [facility] CATH LAB;  Service: Cardiology;  Laterality: N/A;  RN PRE OP 2-6-19  1ST CASE PER  RADHA. NOTIFIED RADHA THAT ANESTHESIA IS NOT PITO FOR 1ST CASE START-LO    INSERTION OF BIVENTRICULAR IMPLANTABLE CARDIOVERTER-DEFIBRILLATOR (ICD) N/A 9/28/2020    Procedure: INSERTION,  ICD, BIVENTRICULAR;  Surgeon: Jim Kwong MD;  [facility] LAB;  Service: Cardiology;  Laterality: N/A;  NICM, CRT-D, SJM,, MAC, DM, 3 Prep*Wearing LifeVest*    oral extraction  2018    TESTICLE SURGERY       Review of patient's allergies indicates:  No Known Allergies    Current Facility-Administered Medications   Medication    acetaminophen tablet 650 mg    carvediloL tablet 12.5 mg    cephALEXin capsule 500 mg    dextrose 50% injection 12.5 g    dextrose 50% injection 25 g    furosemide injection 80 mg    gabapentin capsule 100 mg    glucagon (human recombinant) injection 1 mg    glucose chewable tablet 16 g    glucose chewable tablet 24 g    heparin (porcine) injection 7,500 Units    melatonin tablet 6 mg    polyethylene glycol packet 17 g    pravastatin tablet 80 mg    sacubitriL-valsartan 24-26 mg per tablet 1 tablet    sodium chloride 0.9% flush 10 mL     Family History     Problem Relation (Age of Onset)    Epilepsy Mother        Tobacco Use    Smoking status: Former Smoker     Packs/day: 0.50     Years: 40.00     Pack years: 20.00     Types: Cigarettes, Cigars     Quit date:      Years since quittin.7    Smokeless tobacco: Never Used   Substance and Sexual Activity    Alcohol use: Yes     Comment: once a month    Drug use: No    Sexual activity: Yes     Birth control/protection: None     Comment: uses protection sometimes     Review of Systems   Constitutional: Positive for fatigue.   HENT: Negative.    Eyes: Negative.    Respiratory: Positive for shortness of breath.    Cardiovascular: Negative.    Gastrointestinal: Positive for abdominal distention.   Genitourinary: Negative.    Neurological: Negative.    Psychiatric/Behavioral: Negative.    All other systems reviewed and are negative.    Objective:     Vital Signs (Most Recent):  Temp: 99.5 °F (37.5 °C) (20)  Pulse: 64 (20)  Resp: 18 (20)  BP: 126/74 (20)  SpO2: (!)  90 % (09/30/20 0808) Vital Signs (24h Range):  Temp:  [97.5 °F (36.4 °C)-99.5 °F (37.5 °C)] 99.5 °F (37.5 °C)  Pulse:  [52-99] 64  Resp:  [16-20] 18  SpO2:  [90 %-95 %] 90 %  BP: (113-126)/(63-74) 126/74     Patient Vitals for the past 72 hrs (Last 3 readings):   Weight   09/30/20 0927 (!) 159.2 kg (351 lb)   09/29/20 1500 (!) 159.4 kg (351 lb 6.6 oz)   09/28/20 1245 (!) 158.8 kg (350 lb)     Body mass index is 41.62 kg/m².    Intake/Output Summary (Last 24 hours) at 9/30/2020 1417  Last data filed at 9/30/2020 1025  Gross per 24 hour   Intake 822 ml   Output 1200 ml   Net -378 ml     Physical Exam  Vitals signs and nursing note reviewed.   Constitutional:       Appearance: He is well-developed.   HENT:      Head: Normocephalic.   Eyes:      Pupils: Pupils are equal, round, and reactive to light.   Neck:      Musculoskeletal: Normal range of motion and neck supple.      Comments: + JVD  Cardiovascular:      Rate and Rhythm: Normal rate and regular rhythm.      Heart sounds: Murmur present.      Comments: R upper chest wall dressing CDI.   Pulmonary:      Effort: Pulmonary effort is normal.      Breath sounds: Normal breath sounds.   Abdominal:      General: Bowel sounds are normal. There is distension.      Palpations: Abdomen is soft.   Musculoskeletal: Normal range of motion.      Right lower leg: Edema present.      Left lower leg: Edema present.   Skin:     General: Skin is warm and dry.   Neurological:      Mental Status: He is alert and oriented to person, place, and time.   Psychiatric:         Behavior: Behavior normal.     Significant Labs:  CBC:  Recent Labs   Lab 09/25/20  0940 09/28/20  1932 09/29/20  1539 09/30/20  0303   WBC 5.25  --  6.63 6.86   RBC 4.76  --  4.53* 4.27*   HGB 11.2*  --  10.5* 10.0*   HCT 38.6* 37 36.0* 34.3*     --  201 227   MCV 81*  --  80* 80*   MCH 23.5*  --  23.2* 23.4*   MCHC 29.0*  --  29.2* 29.2*     BNP:  Recent Labs   Lab 09/29/20  1539   *     CMP:  Recent  Labs   Lab 09/29/20  1539 09/29/20  2131 09/30/20  0303    111* 101   CALCIUM 8.5* 8.4* 8.3*   ALBUMIN 2.8* 2.7* 2.7*   PROT 6.9  --  6.7    142 142   K 3.2* 3.8 3.8   CO2 34* 33* 31*   CL 98 100 100   BUN 18 17 17   CREATININE 1.1 1.2 1.1   ALKPHOS 87  --  81   ALT 32  --  27   AST 36  --  29   BILITOT 1.4*  --  1.1*      Coagulation:   Recent Labs   Lab 09/25/20  0940   INR 1.2   APTT 30.7     LDH:  No results for input(s): LDH in the last 72 hours.  Microbiology:  Microbiology Results (last 7 days)     ** No results found for the last 168 hours. **        I have reviewed all pertinent labs within the past 24 hours.    Diagnostic Results:  I have reviewed all pertinent imaging results/findings within the past 24 hours.    Assessment/Plan:     * Acute on chronic combined systolic and diastolic heart failure  -NICM. EF 18%. NYHA class IV symptoms.   -Volume up on exam. Would recommend increasing diuresis. Would aim to be 2L net negative daily. Can start continuous Lasix gtt if necessary.  -GDMT: Continue Carvedilol 12.5mg BID, Entresto(can increase to 49-51mg BID). Can restart spironolactone as well. If hypotension becomes an issue, can transition to metoprolol succinate from carvedilol to give more BP room for entresto.  -Echo 9/30: EF 18%, Mod MR, Mild-Mod TR, LVIDD 7.17, TAPSE 1.91.      Thank you for your consult. I will follow-up with patient. Please contact us if you have any additional questions.    Boris Valiente, NP  Heart Transplant  [facility]

## 2020-09-30 NOTE — ASSESSMENT & PLAN NOTE
Non-O2 dependent with need for BiPAP following bi-V ICD placement. Subsequently weaned to RA with diuresis. 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 CXR with pulmonary edema, pleural effusions, and cardiomegaly. Received 2 doses of 60 mg IV lasix since procedure with good urine output.  DDX: Acute heart failure exacerbation , ACS (less likely), COPD (no history of this but patient does have smoking history), PNA (no fevers, chills)    -   - check troponin to r/o ischemia  - lasix 80 IV BID  - repeat TTE  - continue home carvedilol 12.5 BID  - entresto   - hold aldactone   - HTS consultation per EP recs, appreciate recommendations  - K>4, Mg >2 - replete as needed

## 2020-09-30 NOTE — HOSPITAL COURSE
Admitted to hospital medicine for management of acute hypoxemic respiratory failure likely secondary to acute on chronic combined HFpEF/HFrEF s/p Bi-V ICD placement. Diursed on the floor with 1 day of lasix gtt and IV pushes. CXR with cardiomegaly, cephalization, pleural effusions, and congestion. Repeat EF 18%, G3DD, PAP 50s. Had to be transferred to MICU requiring pressor support 2/2 septic shock from acute complicated cystitis of serratia. Started on cefepime and transitioned to levaquin, course completed. Was weaned off pressors and restarted on GDMT. Discharged to inpatient rehab. Recommend follow up with Dr. Long within 10 days for GDMT titration as well as PCP follow up.

## 2020-09-30 NOTE — SUBJECTIVE & OBJECTIVE
Interval History: Runs of v-tach overnight, renal function wnl. Low flow nasal cannula at night but overall doing better, was on room air this AM. UO 2.2 L with net negative 1.5 L.     Review of Systems   Constitutional: Positive for activity change and unexpected weight change (weight gain ). Negative for chills, diaphoresis, fatigue and fever.   HENT: Negative for facial swelling and trouble swallowing.    Eyes: Negative for visual disturbance.   Respiratory: Positive for cough and shortness of breath. Negative for apnea, choking, chest tightness and wheezing.    Cardiovascular: Positive for leg swelling. Negative for chest pain and palpitations.   Gastrointestinal: Positive for abdominal distention. Negative for abdominal pain, blood in stool, constipation, diarrhea, nausea and vomiting.   Endocrine: Negative for cold intolerance, heat intolerance and polyuria.   Genitourinary: Negative for dysuria and hematuria.   Musculoskeletal: Negative for back pain and myalgias.   Skin: Negative for color change, pallor and wound.   Neurological: Negative for dizziness, tremors and weakness.   Hematological: Negative for adenopathy.   Psychiatric/Behavioral: Negative for agitation.     Objective:     Vital Signs (Most Recent):  Temp: 99.5 °F (37.5 °C) (09/30/20 0808)  Pulse: 64 (09/30/20 0927)  Resp: 18 (09/30/20 0808)  BP: 126/74 (09/30/20 0927)  SpO2: (!) 90 % (09/30/20 0808) Vital Signs (24h Range):  Temp:  [97.5 °F (36.4 °C)-99.5 °F (37.5 °C)] 99.5 °F (37.5 °C)  Pulse:  [52-99] 64  Resp:  [16-20] 18  SpO2:  [90 %-95 %] 90 %  BP: (106-126)/(62-74) 126/74     Weight: (!) 159.2 kg (351 lb)  Body mass index is 41.62 kg/m².    Intake/Output Summary (Last 24 hours) at 9/30/2020 1003  Last data filed at 9/30/2020 0500  Gross per 24 hour   Intake 804 ml   Output 2025 ml   Net -1221 ml      Physical Exam  Vitals signs and nursing note reviewed.   Constitutional:       Appearance: Normal appearance. He is obese. He is not  ill-appearing, toxic-appearing or diaphoretic.   HENT:      Head: Normocephalic and atraumatic.      Mouth/Throat:      Mouth: Mucous membranes are moist.      Pharynx: No oropharyngeal exudate.   Eyes:      General: No scleral icterus.     Extraocular Movements: Extraocular movements intact.      Conjunctiva/sclera: Conjunctivae normal.      Pupils: Pupils are equal, round, and reactive to light.   Neck:      Musculoskeletal: Normal range of motion and neck supple.   Cardiovascular:      Rate and Rhythm: Normal rate and regular rhythm.      Pulses: Normal pulses.      Heart sounds: No murmur.   Pulmonary:      Comments: On room air  Appears more comfortable today  Talking in complete sentences, no accessory muscle use  Course bilateral breath sounds      Chest:      Chest wall: No tenderness.   Abdominal:      General: Abdomen is flat. Bowel sounds are normal. There is distension.      Tenderness: There is no abdominal tenderness. There is no guarding or rebound.   Musculoskeletal:      Right lower leg: Edema (3+) present.      Left lower leg: Edema (2+) present.   Lymphadenopathy:      Cervical: No cervical adenopathy.   Skin:     General: Skin is warm and dry.      Capillary Refill: Capillary refill takes less than 2 seconds.   Neurological:      General: No focal deficit present.      Mental Status: He is alert and oriented to person, place, and time. Mental status is at baseline.   Psychiatric:         Mood and Affect: Mood normal.         Significant Labs: All pertinent labs within the past 24 hours have been reviewed.    Significant Imaging: I have reviewed all pertinent imaging results/findings within the past 24 hours.

## 2020-09-30 NOTE — PROGRESS NOTES
[facility] Hospital Medicine  Progress Note    Patient Name: Papito Bhakta  MRN: [id number]  Patient Class: IP- Inpatient   Admission Date: 9/28/2020  Length of Stay: 0 days  Attending Physician: Hamlet Morales MD  Primary Care Provider: Brynn To MD    Hospital Medicine Team: [facility] 3 Jennifer Ledezma MD    Subjective:     Principal Problem:Acute hypoxemic respiratory failure        HPI:  Mr. Bhakta is a 64 yo gentleman with PMH of NICM with AICD and recent R CRT-D placement 9/27, hypertension, HFpEF/HFrEF (EF 25% June 2020), hyperlipidemia, and obesity presenting for shortness of breath and hypoxia. On September 27 he underwent placement of cardiac resynchronization therapy pacemaker with Dr. Kwong. After procedure it was difficult to awaken patient. His pH was 7.26 and CO2 was elevated, and he was subsequently placed on BiPAP then weaned down to room air after diuresis. It was recommended he be admitted for volume overload in the setting of hypoxemia. He has been experiencing increased shortness of breath and lower extremity swelling for the past few months since his bi-V ICD was removed in June 2020 secondary to infection. He reports 2 pillow orthopnea, dyspnea ambulating from his room to the kitchen, and a 20# weight gain. Previously he was more active and able to do household chores and yard work. He has been compliant with his home medications and has been alternating between taking 80 mg of lasix once to twice daily though he was prescribed daily on discharge from the hospital in June. He denies chest pain, fevers, chills, weight loss, nausea, vomiting, diarrhea, melena/hematochezia, and dysuria.     ED/Post-Op Course:  On room air. CXR showed cardiomegaly, pulmonary edema, and pleural effusion. Received 60 mg IV lasix x2 with good urine output and improved respiratory status. CMP revealed increased CO2.     Overview/Hospital Course:  Admitted to hospital medicine for  management of acute hypoxemic respiratory failure likely secondary to acute on chronic combined HFpEF/HFrEF s/p Bi-V ICD placement. No further bipap requirements in hospital stay. CXR with cardiomegaly, cephalization, pleural effusions, and congestion. Receiving IV lasix with good urine output. HTS consulted per EP recommendations. Respiratory status improved, used low flow nasal cannula overnight but on room air during day.    Interval History: Runs of v-tach overnight, renal function wnl. Low flow nasal cannula at night but overall doing better, was on room air this AM. UO 2.2 L with net negative 1.5 L.     Review of Systems   Constitutional: Positive for activity change and unexpected weight change (weight gain ). Negative for chills, diaphoresis, fatigue and fever.   HENT: Negative for facial swelling and trouble swallowing.    Eyes: Negative for visual disturbance.   Respiratory: Positive for cough and shortness of breath. Negative for apnea, choking, chest tightness and wheezing.    Cardiovascular: Positive for leg swelling. Negative for chest pain and palpitations.   Gastrointestinal: Positive for abdominal distention. Negative for abdominal pain, blood in stool, constipation, diarrhea, nausea and vomiting.   Endocrine: Negative for cold intolerance, heat intolerance and polyuria.   Genitourinary: Negative for dysuria and hematuria.   Musculoskeletal: Negative for back pain and myalgias.   Skin: Negative for color change, pallor and wound.   Neurological: Negative for dizziness, tremors and weakness.   Hematological: Negative for adenopathy.   Psychiatric/Behavioral: Negative for agitation.     Objective:     Vital Signs (Most Recent):  Temp: 99.5 °F (37.5 °C) (09/30/20 0808)  Pulse: 64 (09/30/20 0927)  Resp: 18 (09/30/20 0808)  BP: 126/74 (09/30/20 0927)  SpO2: (!) 90 % (09/30/20 0808) Vital Signs (24h Range):  Temp:  [97.5 °F (36.4 °C)-99.5 °F (37.5 °C)] 99.5 °F (37.5 °C)  Pulse:  [52-99] 64  Resp:  [16-20]  18  SpO2:  [90 %-95 %] 90 %  BP: (106-126)/(62-74) 126/74     Weight: (!) 159.2 kg (351 lb)  Body mass index is 41.62 kg/m².    Intake/Output Summary (Last 24 hours) at 9/30/2020 1003  Last data filed at 9/30/2020 0500  Gross per 24 hour   Intake 804 ml   Output 2025 ml   Net -1221 ml      Physical Exam  Vitals signs and nursing note reviewed.   Constitutional:       Appearance: Normal appearance. He is obese. He is not ill-appearing, toxic-appearing or diaphoretic.   HENT:      Head: Normocephalic and atraumatic.      Mouth/Throat:      Mouth: Mucous membranes are moist.      Pharynx: No oropharyngeal exudate.   Eyes:      General: No scleral icterus.     Extraocular Movements: Extraocular movements intact.      Conjunctiva/sclera: Conjunctivae normal.      Pupils: Pupils are equal, round, and reactive to light.   Neck:      Musculoskeletal: Normal range of motion and neck supple.   Cardiovascular:      Rate and Rhythm: Normal rate and regular rhythm.      Pulses: Normal pulses.      Heart sounds: No murmur.   Pulmonary:      Comments: On room air  Appears more comfortable today  Talking in complete sentences, no accessory muscle use  Course bilateral breath sounds      Chest:      Chest wall: No tenderness.   Abdominal:      General: Abdomen is flat. Bowel sounds are normal. There is distension.      Tenderness: There is no abdominal tenderness. There is no guarding or rebound.   Musculoskeletal:      Right lower leg: Edema (3+) present.      Left lower leg: Edema (2+) present.   Lymphadenopathy:      Cervical: No cervical adenopathy.   Skin:     General: Skin is warm and dry.      Capillary Refill: Capillary refill takes less than 2 seconds.   Neurological:      General: No focal deficit present.      Mental Status: He is alert and oriented to person, place, and time. Mental status is at baseline.   Psychiatric:         Mood and Affect: Mood normal.         Significant Labs: All pertinent labs within the past 24  hours have been reviewed.    Significant Imaging: I have reviewed all pertinent imaging results/findings within the past 24 hours.      Assessment/Plan:      * Acute hypoxemic respiratory failure  Non-O2 dependent with need for BiPAP following bi-V ICD placement. Subsequently weaned to RA with diuresis. 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 CXR with pulmonary edema, pleural effusions, and cardiomegaly. Received 2 doses of 60 mg IV lasix since procedure with good urine output.  DDX: Acute heart failure exacerbation , ACS (less likely), COPD (no history of this but patient does have smoking history), PNA (no fevers, chills)    -   - check troponin to r/o ischemia  - lasix 80 IV BID  - repeat TTE  - continue home carvedilol 12.5 BID  - entresto   - hold aldactone   - HTS consultation per EP recs, appreciate recommendations  - K>4, Mg >2 - replete as needed      Chronic combined systolic and diastolic congestive heart failure  History of combined systolic and diastolic dysfunction, s/p CRT-D 9/27 with continued volume overload. Has AICD. Compliant with home medications.    - Please see Acute Hypoxemic Respiratory Failure      Essential hypertension  Home meds: carvedilol 12.5 BID, ramipiril, aldactone  - continue carvedilol  - entresto   - hold aldactone    Hyperlipidemia  Continue home pravastatin    NICM (nonischemic cardiomyopathy)  Please see Acute Hypoxemic Respiratory Failure      Biventricular ICD (implantable cardioverter-defibrillator) in place  Placed 9/28 with Dr. Kwong.    - Keflex 500 TID for 5 days (end date 10/1)        VTE Risk Mitigation (From admission, onward)         Ordered     heparin (porcine) injection 7,500 Units  Every 8 hours      09/29/20 1417     IP VTE HIGH RISK PATIENT  Once      09/29/20 1417     Place sequential compression device  Until discontinued      09/29/20 1417                Discharge Planning    MARIIA: 10/2/2020     Code Status: Full Code   Is the patient medically ready for discharge?: No    Reason for patient still in hospital (select all that apply): Treatment  Discharge Plan A: Home with family, Home Health                  Jennifer Ledezma MD  Department of Hospital Medicine   [facility]

## 2020-09-30 NOTE — ASSESSMENT & PLAN NOTE
-NICM. EF 18%. NYHA class IV symptoms.   -Volume up on exam. Would recommend increasing diuresis. Would aim to be 2L net negative daily. Can start continuous Lasix gtt if necessary.  -GDMT: Continue Carvedilol 12.5mg BID, Entresto(can increase to 49-51mg BID). Can restart spironolactone as well. If hypotension becomes an issue, can transition to metoprolol succinate from carvedilol to give more BP room for entresto.  -Echo 9/30: EF 18%, Mod MR, Mild-Mod TR, LVIDD 7.17, TAPSE 1.91.

## 2020-09-30 NOTE — SUBJECTIVE & OBJECTIVE
Past Medical History:   Diagnosis Date    Anticoagulant long-term use     Aspirin    CHF (congestive heart failure)     Hyperlipidemia     Hypertension     NICM (nonischemic cardiomyopathy) 2020    Obesity      Past Surgical History:   Procedure Laterality Date    INSERTION OF BIVENTRICULAR IMPLANTABLE CARDIOVERTER-DEFIBRILLATOR (ICD) N/A 2019    Procedure: INSERTION, ICD, BIVENTRICULAR;  Surgeon: Shailesh Eng MD;  Location: [facility] CATH LAB;  Service: Cardiology;  Laterality: N/A;  RN PRE OP 2-19  1ST CASE PER  RADHA. NOTIFIED RADHA THAT ANESTHESIA IS NOT PITO FOR 1ST CASE START-LO    INSERTION OF BIVENTRICULAR IMPLANTABLE CARDIOVERTER-DEFIBRILLATOR (ICD) N/A 2020    Procedure: INSERTION, ICD, BIVENTRICULAR;  Surgeon: Jim Kwong MD;  Location: [facility] EP LAB;  Service: Cardiology;  Laterality: N/A;  NICM, CRT-D, SJM,, MAC, DM, 3 Prep*Wearing LifeVest*    oral extraction  2018    TESTICLE SURGERY       Review of patient's allergies indicates:  No Known Allergies    Current Facility-Administered Medications   Medication    acetaminophen tablet 650 mg    carvediloL tablet 12.5 mg    cephALEXin capsule 500 mg    dextrose 50% injection 12.5 g    dextrose 50% injection 25 g    furosemide injection 80 mg    gabapentin capsule 100 mg    glucagon (human recombinant) injection 1 mg    glucose chewable tablet 16 g    glucose chewable tablet 24 g    heparin (porcine) injection 7,500 Units    melatonin tablet 6 mg    polyethylene glycol packet 17 g    pravastatin tablet 80 mg    sacubitriL-valsartan 24-26 mg per tablet 1 tablet    sodium chloride 0.9% flush 10 mL     Family History     Problem Relation (Age of Onset)    Epilepsy Mother        Tobacco Use    Smoking status: Former Smoker     Packs/day: 0.50     Years: 40.00     Pack years: 20.00     Types: Cigarettes, Cigars     Quit date:      Years since quittin.7    Smokeless tobacco: Never Used   Substance and  Sexual Activity    Alcohol use: Yes     Comment: once a month    Drug use: No    Sexual activity: Yes     Birth control/protection: None     Comment: uses protection sometimes     Review of Systems   Constitutional: Positive for fatigue.   HENT: Negative.    Eyes: Negative.    Respiratory: Positive for shortness of breath.    Cardiovascular: Negative.    Gastrointestinal: Positive for abdominal distention.   Genitourinary: Negative.    Neurological: Negative.    Psychiatric/Behavioral: Negative.    All other systems reviewed and are negative.    Objective:     Vital Signs (Most Recent):  Temp: 99.5 °F (37.5 °C) (09/30/20 0808)  Pulse: 64 (09/30/20 0927)  Resp: 18 (09/30/20 0808)  BP: 126/74 (09/30/20 0927)  SpO2: (!) 90 % (09/30/20 0808) Vital Signs (24h Range):  Temp:  [97.5 °F (36.4 °C)-99.5 °F (37.5 °C)] 99.5 °F (37.5 °C)  Pulse:  [52-99] 64  Resp:  [16-20] 18  SpO2:  [90 %-95 %] 90 %  BP: (113-126)/(63-74) 126/74     Patient Vitals for the past 72 hrs (Last 3 readings):   Weight   09/30/20 0927 (!) 159.2 kg (351 lb)   09/29/20 1500 (!) 159.4 kg (351 lb 6.6 oz)   09/28/20 1245 (!) 158.8 kg (350 lb)     Body mass index is 41.62 kg/m².    Intake/Output Summary (Last 24 hours) at 9/30/2020 1417  Last data filed at 9/30/2020 1025  Gross per 24 hour   Intake 822 ml   Output 1200 ml   Net -378 ml     Physical Exam  Vitals signs and nursing note reviewed.   Constitutional:       Appearance: He is well-developed.   HENT:      Head: Normocephalic.   Eyes:      Pupils: Pupils are equal, round, and reactive to light.   Neck:      Musculoskeletal: Normal range of motion and neck supple.      Comments: + JVD  Cardiovascular:      Rate and Rhythm: Normal rate and regular rhythm.      Heart sounds: Murmur present.      Comments: R upper chest wall dressing CDI.   Pulmonary:      Effort: Pulmonary effort is normal.      Breath sounds: Normal breath sounds.   Abdominal:      General: Bowel sounds are normal. There is  distension.      Palpations: Abdomen is soft.   Musculoskeletal: Normal range of motion.      Right lower leg: Edema present.      Left lower leg: Edema present.   Skin:     General: Skin is warm and dry.   Neurological:      Mental Status: He is alert and oriented to person, place, and time.   Psychiatric:         Behavior: Behavior normal.     Significant Labs:  CBC:  Recent Labs   Lab 09/25/20  0940 09/28/20  1932 09/29/20  1539 09/30/20  0303   WBC 5.25  --  6.63 6.86   RBC 4.76  --  4.53* 4.27*   HGB 11.2*  --  10.5* 10.0*   HCT 38.6* 37 36.0* 34.3*     --  201 227   MCV 81*  --  80* 80*   MCH 23.5*  --  23.2* 23.4*   MCHC 29.0*  --  29.2* 29.2*     BNP:  Recent Labs   Lab 09/29/20  1539   *     CMP:  Recent Labs   Lab 09/29/20  1539 09/29/20  2131 09/30/20  0303    111* 101   CALCIUM 8.5* 8.4* 8.3*   ALBUMIN 2.8* 2.7* 2.7*   PROT 6.9  --  6.7    142 142   K 3.2* 3.8 3.8   CO2 34* 33* 31*   CL 98 100 100   BUN 18 17 17   CREATININE 1.1 1.2 1.1   ALKPHOS 87  --  81   ALT 32  --  27   AST 36  --  29   BILITOT 1.4*  --  1.1*      Coagulation:   Recent Labs   Lab 09/25/20  0940   INR 1.2   APTT 30.7     LDH:  No results for input(s): LDH in the last 72 hours.  Microbiology:  Microbiology Results (last 7 days)     ** No results found for the last 168 hours. **        I have reviewed all pertinent labs within the past 24 hours.    Diagnostic Results:  I have reviewed all pertinent imaging results/findings within the past 24 hours.

## 2020-09-30 NOTE — PT/OT/SLP PROGRESS
"Occupational Therapy      Patient Name:  Papito Bhakta   MRN:  [id number]    OT orders received and acknowledged 9/30/2020. OT attempted evaluation at 1323 and was present in pt's room from [id number]. OT initiated evaluation, education pt on role of OT, OT POC, and obtained living history from pt.  Pt lives with his daughter and grandchild in a [facility] with no SHIRA. Pt has a tub/shower combo with grab bar and bath bench present. PTA, pt was (I) with ADLs and functional mobility. Pt owns a RW, wheelchair, and bath bench. Pt does not work but was driving. Pt was completing IADLs around the home, PTA. Pt reports his daughter can assist upon d/c. After obtaining living history, pt adamantly refused OOB mobility due to fatigue and wanting to visit with his son whom was present in room. Pt stated, " I just ate and I wanted to rest and now y'all come in here." Pt educated on the purpose of OT evaluation in the acute setting in order to assess is pt is safe to return home or may require post acute Rehab. Pt verbalized all understanding but adamantly refused to perform supine <> sit or any other OOB mobility despite max encouragement. OT provided education on PPM precautions for R UE. Pt was able to reposition trunk towards the left using bed rail with L UE. Increased time provided for repositioning sling and swathe. LUE MMT 3-/5 while sitting supported in bed. Unable to assess R UE MMT due to PPM precautions. No billable units charged as therapist was unable to assess OOB mobility and make appropriate post acute discharge recommendation due to pt's refusal to perform OOB mobility.       Florence Dorsey OTR/L  Pager: [phone]  9/30/2020    "

## 2020-09-30 NOTE — PLAN OF CARE
Pt free of falls/trauma/injuries.  Denies c/o SOB, CP, or discomfort.  Generalized skin remains CDI;Except for a surgical placement to right chest wall.  Plus 2 edema noted to bilateral legs.Pt being diuresed with Lasix ; diuresing well.  Electrolytes replaced as ordered.Pt had several beats of Vtach earlier spoke with oncall and he ordered a renal panel..  Pt tolerating plan of care.

## 2020-09-30 NOTE — PLAN OF CARE
Echo done. EF 18%. K+ 3.8. Mag 1.8. Replaced. Pt being diuresed with 80mg lasix IV BID. Plan of care discussed with patient. Fall precautions in place. Patient has no complaints of pain. Discussed medications and care. Patient has no questions at this time. Will continue to monitor.

## 2020-10-01 LAB
ALBUMIN SERPL BCP-MCNC: 2.8 G/DL (ref 3.5–5.2)
ALP SERPL-CCNC: 84 U/L (ref 55–135)
ALT SERPL W/O P-5'-P-CCNC: 21 U/L (ref 10–44)
ANION GAP SERPL CALC-SCNC: 10 MMOL/L (ref 8–16)
ANION GAP SERPL CALC-SCNC: 11 MMOL/L (ref 8–16)
ANION GAP SERPL CALC-SCNC: 9 MMOL/L (ref 8–16)
ANION GAP SERPL CALC-SCNC: 9 MMOL/L (ref 8–16)
AST SERPL-CCNC: 22 U/L (ref 10–40)
BASOPHILS # BLD AUTO: 0.03 K/UL (ref 0–0.2)
BASOPHILS # BLD AUTO: 0.04 K/UL (ref 0–0.2)
BASOPHILS NFR BLD: 0.4 % (ref 0–1.9)
BASOPHILS NFR BLD: 0.6 % (ref 0–1.9)
BILIRUB SERPL-MCNC: 1 MG/DL (ref 0.1–1)
BUN SERPL-MCNC: 15 MG/DL (ref 8–23)
CALCIUM SERPL-MCNC: 8.4 MG/DL (ref 8.7–10.5)
CALCIUM SERPL-MCNC: 8.4 MG/DL (ref 8.7–10.5)
CALCIUM SERPL-MCNC: 8.5 MG/DL (ref 8.7–10.5)
CALCIUM SERPL-MCNC: 8.6 MG/DL (ref 8.7–10.5)
CHLORIDE SERPL-SCNC: 100 MMOL/L (ref 95–110)
CHLORIDE SERPL-SCNC: 100 MMOL/L (ref 95–110)
CHLORIDE SERPL-SCNC: 101 MMOL/L (ref 95–110)
CHLORIDE SERPL-SCNC: 99 MMOL/L (ref 95–110)
CO2 SERPL-SCNC: 33 MMOL/L (ref 23–29)
CO2 SERPL-SCNC: 36 MMOL/L (ref 23–29)
CO2 SERPL-SCNC: 36 MMOL/L (ref 23–29)
CO2 SERPL-SCNC: 37 MMOL/L (ref 23–29)
CREAT SERPL-MCNC: 0.9 MG/DL (ref 0.5–1.4)
CREAT SERPL-MCNC: 1 MG/DL (ref 0.5–1.4)
DIFFERENTIAL METHOD: ABNORMAL
DIFFERENTIAL METHOD: ABNORMAL
EOSINOPHIL # BLD AUTO: 0.2 K/UL (ref 0–0.5)
EOSINOPHIL # BLD AUTO: 0.2 K/UL (ref 0–0.5)
EOSINOPHIL NFR BLD: 2.1 % (ref 0–8)
EOSINOPHIL NFR BLD: 2.2 % (ref 0–8)
ERYTHROCYTE [DISTWIDTH] IN BLOOD BY AUTOMATED COUNT: 15.7 % (ref 11.5–14.5)
ERYTHROCYTE [DISTWIDTH] IN BLOOD BY AUTOMATED COUNT: 15.9 % (ref 11.5–14.5)
EST. GFR  (AFRICAN AMERICAN): >60 ML/MIN/1.73 M^2
EST. GFR  (NON AFRICAN AMERICAN): >60 ML/MIN/1.73 M^2
GLUCOSE SERPL-MCNC: 101 MG/DL (ref 70–110)
GLUCOSE SERPL-MCNC: 101 MG/DL (ref 70–110)
GLUCOSE SERPL-MCNC: 84 MG/DL (ref 70–110)
GLUCOSE SERPL-MCNC: 99 MG/DL (ref 70–110)
HCT VFR BLD AUTO: 36.7 % (ref 40–54)
HCT VFR BLD AUTO: 37.3 % (ref 40–54)
HGB BLD-MCNC: 10.4 G/DL (ref 14–18)
HGB BLD-MCNC: 10.5 G/DL (ref 14–18)
IMM GRANULOCYTES # BLD AUTO: 0.01 K/UL (ref 0–0.04)
IMM GRANULOCYTES # BLD AUTO: 0.03 K/UL (ref 0–0.04)
IMM GRANULOCYTES NFR BLD AUTO: 0.1 % (ref 0–0.5)
IMM GRANULOCYTES NFR BLD AUTO: 0.4 % (ref 0–0.5)
LYMPHOCYTES # BLD AUTO: 1.2 K/UL (ref 1–4.8)
LYMPHOCYTES # BLD AUTO: 1.3 K/UL (ref 1–4.8)
LYMPHOCYTES NFR BLD: 15.8 % (ref 18–48)
LYMPHOCYTES NFR BLD: 19.2 % (ref 18–48)
MAGNESIUM SERPL-MCNC: 1.8 MG/DL (ref 1.6–2.6)
MAGNESIUM SERPL-MCNC: 1.9 MG/DL (ref 1.6–2.6)
MCH RBC QN AUTO: 23.2 PG (ref 27–31)
MCH RBC QN AUTO: 23.3 PG (ref 27–31)
MCHC RBC AUTO-ENTMCNC: 28.2 G/DL (ref 32–36)
MCHC RBC AUTO-ENTMCNC: 28.3 G/DL (ref 32–36)
MCV RBC AUTO: 82 FL (ref 82–98)
MCV RBC AUTO: 83 FL (ref 82–98)
MONOCYTES # BLD AUTO: 1.1 K/UL (ref 0.3–1)
MONOCYTES # BLD AUTO: 1.3 K/UL (ref 0.3–1)
MONOCYTES NFR BLD: 15.6 % (ref 4–15)
MONOCYTES NFR BLD: 17.8 % (ref 4–15)
NEUTROPHILS # BLD AUTO: 4.3 K/UL (ref 1.8–7.7)
NEUTROPHILS # BLD AUTO: 4.7 K/UL (ref 1.8–7.7)
NEUTROPHILS NFR BLD: 62.3 % (ref 38–73)
NEUTROPHILS NFR BLD: 63.5 % (ref 38–73)
NRBC BLD-RTO: 0 /100 WBC
NRBC BLD-RTO: 0 /100 WBC
PHOSPHATE SERPL-MCNC: 4.1 MG/DL (ref 2.7–4.5)
PLATELET # BLD AUTO: 208 K/UL (ref 150–350)
PLATELET # BLD AUTO: 226 K/UL (ref 150–350)
PMV BLD AUTO: 11.3 FL (ref 9.2–12.9)
PMV BLD AUTO: 11.9 FL (ref 9.2–12.9)
POTASSIUM SERPL-SCNC: 3.7 MMOL/L (ref 3.5–5.1)
POTASSIUM SERPL-SCNC: 3.9 MMOL/L (ref 3.5–5.1)
POTASSIUM SERPL-SCNC: 3.9 MMOL/L (ref 3.5–5.1)
POTASSIUM SERPL-SCNC: 4 MMOL/L (ref 3.5–5.1)
PROT SERPL-MCNC: 7.4 G/DL (ref 6–8.4)
RBC # BLD AUTO: 4.46 M/UL (ref 4.6–6.2)
RBC # BLD AUTO: 4.52 M/UL (ref 4.6–6.2)
SODIUM SERPL-SCNC: 145 MMOL/L (ref 136–145)
SODIUM SERPL-SCNC: 146 MMOL/L (ref 136–145)
WBC # BLD AUTO: 6.94 K/UL (ref 3.9–12.7)
WBC # BLD AUTO: 7.46 K/UL (ref 3.9–12.7)

## 2020-10-01 PROCEDURE — 25000003 PHARM REV CODE 250: Mod: HCNC | Performed by: NURSE PRACTITIONER

## 2020-10-01 PROCEDURE — 94660 CPAP INITIATION&MGMT: CPT | Mod: HCNC

## 2020-10-01 PROCEDURE — 97530 THERAPEUTIC ACTIVITIES: CPT | Mod: HCNC

## 2020-10-01 PROCEDURE — 80053 COMPREHEN METABOLIC PANEL: CPT | Mod: HCNC

## 2020-10-01 PROCEDURE — 99900035 HC TECH TIME PER 15 MIN (STAT): Mod: HCNC

## 2020-10-01 PROCEDURE — 99233 PR SUBSEQUENT HOSPITAL CARE,LEVL III: ICD-10-PCS | Mod: HCNC,,, | Performed by: HOSPITALIST

## 2020-10-01 PROCEDURE — 63600175 PHARM REV CODE 636 W HCPCS: Mod: HCNC | Performed by: STUDENT IN AN ORGANIZED HEALTH CARE EDUCATION/TRAINING PROGRAM

## 2020-10-01 PROCEDURE — 80048 BASIC METABOLIC PNL TOTAL CA: CPT | Mod: 91,HCNC

## 2020-10-01 PROCEDURE — 80048 BASIC METABOLIC PNL TOTAL CA: CPT | Mod: HCNC

## 2020-10-01 PROCEDURE — 83735 ASSAY OF MAGNESIUM: CPT | Mod: HCNC

## 2020-10-01 PROCEDURE — 20600001 HC STEP DOWN PRIVATE ROOM: Mod: HCNC

## 2020-10-01 PROCEDURE — 97161 PT EVAL LOW COMPLEX 20 MIN: CPT | Mod: HCNC

## 2020-10-01 PROCEDURE — 97535 SELF CARE MNGMENT TRAINING: CPT | Mod: HCNC

## 2020-10-01 PROCEDURE — 85025 COMPLETE CBC W/AUTO DIFF WBC: CPT | Mod: HCNC

## 2020-10-01 PROCEDURE — 27000221 HC OXYGEN, UP TO 24 HOURS: Mod: HCNC

## 2020-10-01 PROCEDURE — 94761 N-INVAS EAR/PLS OXIMETRY MLT: CPT | Mod: HCNC

## 2020-10-01 PROCEDURE — 83735 ASSAY OF MAGNESIUM: CPT | Mod: 91,HCNC

## 2020-10-01 PROCEDURE — 84100 ASSAY OF PHOSPHORUS: CPT | Mod: HCNC

## 2020-10-01 PROCEDURE — 25000003 PHARM REV CODE 250: Mod: HCNC | Performed by: INTERNAL MEDICINE

## 2020-10-01 PROCEDURE — 36415 COLL VENOUS BLD VENIPUNCTURE: CPT | Mod: HCNC

## 2020-10-01 PROCEDURE — 99233 SBSQ HOSP IP/OBS HIGH 50: CPT | Mod: HCNC,,, | Performed by: HOSPITALIST

## 2020-10-01 PROCEDURE — 25000003 PHARM REV CODE 250: Mod: HCNC | Performed by: STUDENT IN AN ORGANIZED HEALTH CARE EDUCATION/TRAINING PROGRAM

## 2020-10-01 PROCEDURE — 97165 OT EVAL LOW COMPLEX 30 MIN: CPT | Mod: HCNC

## 2020-10-01 RX ORDER — FUROSEMIDE 10 MG/ML
80 INJECTION INTRAMUSCULAR; INTRAVENOUS EVERY 6 HOURS
Status: DISCONTINUED | OUTPATIENT
Start: 2020-10-01 | End: 2020-10-02

## 2020-10-01 RX ORDER — METOPROLOL TARTRATE 25 MG/1
25 TABLET, FILM COATED ORAL 2 TIMES DAILY
Status: DISCONTINUED | OUTPATIENT
Start: 2020-10-01 | End: 2020-10-03

## 2020-10-01 RX ORDER — FUROSEMIDE 10 MG/ML
80 INJECTION INTRAMUSCULAR; INTRAVENOUS
Status: DISCONTINUED | OUTPATIENT
Start: 2020-10-01 | End: 2020-10-01

## 2020-10-01 RX ADMIN — CEPHALEXIN 500 MG: 500 CAPSULE ORAL at 09:10

## 2020-10-01 RX ADMIN — HEPARIN SODIUM 7500 UNITS: 5000 INJECTION INTRAVENOUS; SUBCUTANEOUS at 09:10

## 2020-10-01 RX ADMIN — CARVEDILOL 12.5 MG: 12.5 TABLET, FILM COATED ORAL at 08:10

## 2020-10-01 RX ADMIN — SACUBITRIL AND VALSARTAN 1 TABLET: 24; 26 TABLET, FILM COATED ORAL at 08:10

## 2020-10-01 RX ADMIN — METOPROLOL TARTRATE 25 MG: 25 TABLET, FILM COATED ORAL at 09:10

## 2020-10-01 RX ADMIN — GABAPENTIN 100 MG: 100 CAPSULE ORAL at 09:10

## 2020-10-01 RX ADMIN — CEPHALEXIN 500 MG: 500 CAPSULE ORAL at 06:10

## 2020-10-01 RX ADMIN — SACUBITRIL AND VALSARTAN 1 TABLET: 24; 26 TABLET, FILM COATED ORAL at 10:10

## 2020-10-01 RX ADMIN — PRAVASTATIN SODIUM 80 MG: 40 TABLET ORAL at 08:10

## 2020-10-01 RX ADMIN — SACUBITRIL AND VALSARTAN 1 TABLET: 49; 51 TABLET, FILM COATED ORAL at 09:10

## 2020-10-01 RX ADMIN — FUROSEMIDE 80 MG: 10 INJECTION, SOLUTION INTRAVENOUS at 05:10

## 2020-10-01 RX ADMIN — FUROSEMIDE 80 MG: 10 INJECTION, SOLUTION INTRAVENOUS at 08:10

## 2020-10-01 RX ADMIN — CEPHALEXIN 500 MG: 500 CAPSULE ORAL at 03:10

## 2020-10-01 RX ADMIN — HEPARIN SODIUM 7500 UNITS: 5000 INJECTION INTRAVENOUS; SUBCUTANEOUS at 03:10

## 2020-10-01 RX ADMIN — HEPARIN SODIUM 7500 UNITS: 5000 INJECTION INTRAVENOUS; SUBCUTANEOUS at 06:10

## 2020-10-01 RX ADMIN — GABAPENTIN 100 MG: 100 CAPSULE ORAL at 03:10

## 2020-10-01 RX ADMIN — GABAPENTIN 100 MG: 100 CAPSULE ORAL at 08:10

## 2020-10-01 RX ADMIN — FUROSEMIDE 80 MG: 10 INJECTION, SOLUTION INTRAVENOUS at 11:10

## 2020-10-01 NOTE — PLAN OF CARE
OT harvey completed and goals established 10/1/2020  ERICK Reyes/KASIE  Pager #: [phone]  10/1/2020    Problem: Occupational Therapy Goal  Goal: Occupational Therapy Goal  Description: Goals to be met by: 10/14/2020    Patient will increase functional independence with ADLs by performing:    UE Dressing with Supervision.  LE Dressing with Minimal Assistance.  Grooming while standing with Contact Guard Assistance.  Supine to sit with Minimal Assistance.  Toilet transfer to toilet with Minimal Assistance.    Outcome: Ongoing, Progressing

## 2020-10-01 NOTE — PROGRESS NOTES
[facility] Hospital Medicine  Progress Note    Patient Name: Papito Bhakta  MRN: [id number]  Patient Class: IP- Inpatient   Admission Date: 9/28/2020  Length of Stay: 1 days  Attending Physician: Fara Moyer MD  Primary Care Provider: Brynn To MD    [facility] Medicine Team: [facility]OSP MED 3 Patricia Mendiola MD    Subjective:     Principal Problem:Acute on chronic combined systolic and diastolic heart failure        HPI:  Mr. Bhakta is a 64 yo gentleman with PMH of NICM with AICD and recent R CRT-D placement 9/27, hypertension, HFpEF/HFrEF (EF 25% June 2020), hyperlipidemia, and obesity presenting for shortness of breath and hypoxia. On September 27 he underwent placement of cardiac resynchronization therapy pacemaker with Dr. Kwong. After procedure it was difficult to awaken patient. His pH was 7.26 and CO2 was elevated, and he was subsequently placed on BiPAP then weaned down to room air after diuresis. It was recommended he be admitted for volume overload in the setting of hypoxemia. He has been experiencing increased shortness of breath and lower extremity swelling for the past few months since his bi-V ICD was removed in June 2020 secondary to infection. He reports 2 pillow orthopnea, dyspnea ambulating from his room to the kitchen, and a 20# weight gain. Previously he was more active and able to do household chores and yard work. He has been compliant with his home medications and has been alternating between taking 80 mg of lasix once to twice daily though he was prescribed daily on discharge from the hospital in June. He denies chest pain, fevers, chills, weight loss, nausea, vomiting, diarrhea, melena/hematochezia, and dysuria.     ED/Post-Op Course:  On room air. CXR showed cardiomegaly, pulmonary edema, and pleural effusion. Received 60 mg IV lasix x2 with good urine output and improved respiratory status. CMP revealed increased CO2.     Overview/Hospital Course:  Admitted  to hospital medicine for management of acute hypoxemic respiratory failure likely secondary to acute on chronic combined HFpEF/HFrEF s/p Bi-V ICD placement. No further bipap requirements in hospital stay. CXR with cardiomegaly, cephalization, pleural effusions, and congestion. Receiving IV lasix with good urine output. HTS consulted per EP recommendations. Respiratory status improved, used low flow nasal cannula overnight but on room air during day.    Interval History: Runs of v-tach overnight, otherwise NAEO. Otherwise renal function wnl. Low flow nasal cannula at night but overall doing better, was on room air this AM. UO remains WNL. Patient w/ no complaints this morning.      Review of Systems   Constitutional: Positive for unexpected weight change (weight gain ). Negative for activity change, chills, diaphoresis, fatigue and fever.   HENT: Negative for facial swelling and trouble swallowing.    Eyes: Negative for visual disturbance.   Respiratory: Negative for apnea, cough, choking, chest tightness, shortness of breath and wheezing.    Cardiovascular: Positive for leg swelling. Negative for chest pain and palpitations.   Gastrointestinal: Positive for abdominal distention. Negative for abdominal pain, blood in stool, constipation, diarrhea, nausea and vomiting.   Endocrine: Negative for cold intolerance, heat intolerance and polyuria.   Genitourinary: Negative for dysuria and hematuria.   Musculoskeletal: Negative for back pain and myalgias.   Skin: Negative for color change, pallor and wound.   Neurological: Negative for dizziness, tremors and weakness.   Hematological: Negative for adenopathy.   Psychiatric/Behavioral: Negative for agitation, behavioral problems and confusion.     Objective:     Vital Signs (Most Recent):  Temp: 97.6 °F (36.4 °C) (10/01/20 1101)  Pulse: 60 (10/01/20 1101)  Resp: 19 (10/01/20 1101)  BP: 108/60 (10/01/20 1101)  SpO2: (!) 93 % (10/01/20 1101) Vital Signs (24h Range):  Temp:   [97.6 °F (36.4 °C)-98.2 °F (36.8 °C)] 97.6 °F (36.4 °C)  Pulse:  [60-68] 60  Resp:  [16-25] 19  SpO2:  [93 %-100 %] 93 %  BP: (107-138)/(58-77) 108/60     Weight: (!) 159.2 kg (351 lb)  Body mass index is 41.62 kg/m².    Intake/Output Summary (Last 24 hours) at 10/1/2020 1415  Last data filed at 10/1/2020 0900  Gross per 24 hour   Intake 1018 ml   Output 2225 ml   Net -1207 ml      Physical Exam  Vitals signs and nursing note reviewed.   Constitutional:       Appearance: Normal appearance. He is obese. He is not ill-appearing, toxic-appearing or diaphoretic.   HENT:      Head: Normocephalic and atraumatic.      Mouth/Throat:      Mouth: Mucous membranes are moist.      Pharynx: No oropharyngeal exudate.   Eyes:      General: No scleral icterus.     Extraocular Movements: Extraocular movements intact.      Conjunctiva/sclera: Conjunctivae normal.      Pupils: Pupils are equal, round, and reactive to light.   Neck:      Musculoskeletal: Normal range of motion and neck supple.   Cardiovascular:      Rate and Rhythm: Normal rate and regular rhythm.      Pulses: Normal pulses.      Heart sounds: No murmur.   Pulmonary:      Comments: On room air  Appears more comfortable today  Talking in complete sentences, no accessory muscle use  Course bilateral breath sounds      Chest:      Chest wall: No tenderness.   Abdominal:      General: Abdomen is flat. Bowel sounds are normal. There is distension.      Tenderness: There is no abdominal tenderness. There is no guarding or rebound.   Musculoskeletal:      Right lower leg: Edema (3+) present.      Left lower leg: Edema (2+) present.   Lymphadenopathy:      Cervical: No cervical adenopathy.   Skin:     General: Skin is warm and dry.      Capillary Refill: Capillary refill takes less than 2 seconds.   Neurological:      General: No focal deficit present.      Mental Status: He is alert and oriented to person, place, and time. Mental status is at baseline.   Psychiatric:          Mood and Affect: Mood normal.         Significant Labs: All pertinent labs within the past 24 hours have been reviewed.    Significant Imaging: I have reviewed all pertinent imaging results/findings within the past 24 hours.      Assessment/Plan:      NICM (nonischemic cardiomyopathy)  Please see Acute Hypoxemic Respiratory Failure      Biventricular ICD (implantable cardioverter-defibrillator) in place  Placed 9/28 with Dr. Kwong.    - Keflex 500 TID for 5 days (end date 10/1)      Hyperlipidemia  Continue home pravastatin    Acute hypoxemic respiratory failure  Non-O2 dependent with need for BiPAP following bi-V ICD placement. Subsequently weaned to RA with diuresis. 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 CXR with pulmonary edema, pleural effusions, and cardiomegaly. Received 2 doses of 60 mg IV lasix since procedure with good urine output.  DDX: Acute heart failure exacerbation , ACS (less likely), COPD (no history of this but patient does have smoking history), PNA (no fevers, chills)    -   - check troponin to r/o ischemia  - lasix 80 IV TID  - continue home carvedilol 12.5 BID; will deescalate if needed for entresto  - entresto   - hold aldactone   - HTS consultation per EP recs, appreciate recommendations  - K>4, Mg >2 - replete as needed      Chronic combined systolic and diastolic congestive heart failure  History of combined systolic and diastolic dysfunction, s/p CRT-D 9/27 with continued volume overload. Has AICD. Compliant with home medications.    - Please see Acute Hypoxemic Respiratory Failure      Essential hypertension  Home meds: carvedilol 12.5 BID, ramipiril, aldactone  - continue carvedilol  - entresto   - hold aldactone      VTE Risk Mitigation (From admission, onward)         Ordered     heparin (porcine) injection 7,500 Units  Every 8 hours      09/29/20 1417     IP VTE HIGH RISK PATIENT  Once      09/29/20 1417      Place sequential compression device  Until discontinued      09/29/20 1417              Discharge Planning   MARIIA: 10/2/2020     Code Status: Full Code   Is the patient medically ready for discharge?: No    Reason for patient still in hospital (select all that apply): Treatment  Discharge Plan A: Home with family, Home Health         Patricia Mendiola MD  Department of Hospital Medicine   [facility]                      10/01/2020                             STAFF PHYSICIAN NOTE                                   Attending Attestation for Rounds with Resident  I have reviewed and concur with the resident's history, physical, assessment, and plan.  I have personally interviewed and examined the patient at bedside and agree with the resident's findings.           66 yo gentleman with PMH of NICM with AICD and recent R CRT-D placement 9/27, hypertension, HFpEF/HFrEF (EF 25% June 2020), hyperlipidemia, and obesity presenting for shortness of breath and hypoxia s/p AICD placement transferred to medicine for HFE; has been stable on low flow oxygen; getting diuresis and GDMT. Had episode of v tack. On entresto per cardiology.                               Fara Moyer MD  Senior Hospitalist  [id number], [phone]

## 2020-10-01 NOTE — NURSING
Pt had a 15 beat run of v-tach. Pt is asymptomatic and denies chest pain, shortness of breath, and dizziness. VSS. Dr. Hurley notified. Directed to continue to monitor and notify MD if v-tach continues to occur or become sustained.

## 2020-10-01 NOTE — SUBJECTIVE & OBJECTIVE
Interval History: Runs of v-tach overnight, otherwise NAEO. Otherwise renal function wnl. Low flow nasal cannula at night but overall doing better, was on room air this AM. UO remains WNL. Patient w/ no complaints this morning.      Review of Systems   Constitutional: Positive for unexpected weight change (weight gain ). Negative for activity change, chills, diaphoresis, fatigue and fever.   HENT: Negative for facial swelling and trouble swallowing.    Eyes: Negative for visual disturbance.   Respiratory: Negative for apnea, cough, choking, chest tightness, shortness of breath and wheezing.    Cardiovascular: Positive for leg swelling. Negative for chest pain and palpitations.   Gastrointestinal: Positive for abdominal distention. Negative for abdominal pain, blood in stool, constipation, diarrhea, nausea and vomiting.   Endocrine: Negative for cold intolerance, heat intolerance and polyuria.   Genitourinary: Negative for dysuria and hematuria.   Musculoskeletal: Negative for back pain and myalgias.   Skin: Negative for color change, pallor and wound.   Neurological: Negative for dizziness, tremors and weakness.   Hematological: Negative for adenopathy.   Psychiatric/Behavioral: Negative for agitation, behavioral problems and confusion.     Objective:     Vital Signs (Most Recent):  Temp: 97.6 °F (36.4 °C) (10/01/20 1101)  Pulse: 60 (10/01/20 1101)  Resp: 19 (10/01/20 1101)  BP: 108/60 (10/01/20 1101)  SpO2: (!) 93 % (10/01/20 1101) Vital Signs (24h Range):  Temp:  [97.6 °F (36.4 °C)-98.2 °F (36.8 °C)] 97.6 °F (36.4 °C)  Pulse:  [60-68] 60  Resp:  [16-25] 19  SpO2:  [93 %-100 %] 93 %  BP: (107-138)/(58-77) 108/60     Weight: (!) 159.2 kg (351 lb)  Body mass index is 41.62 kg/m².    Intake/Output Summary (Last 24 hours) at 10/1/2020 1415  Last data filed at 10/1/2020 0900  Gross per 24 hour   Intake 1018 ml   Output 2225 ml   Net -1207 ml      Physical Exam  Vitals signs and nursing note reviewed.   Constitutional:        Appearance: Normal appearance. He is obese. He is not ill-appearing, toxic-appearing or diaphoretic.   HENT:      Head: Normocephalic and atraumatic.      Mouth/Throat:      Mouth: Mucous membranes are moist.      Pharynx: No oropharyngeal exudate.   Eyes:      General: No scleral icterus.     Extraocular Movements: Extraocular movements intact.      Conjunctiva/sclera: Conjunctivae normal.      Pupils: Pupils are equal, round, and reactive to light.   Neck:      Musculoskeletal: Normal range of motion and neck supple.   Cardiovascular:      Rate and Rhythm: Normal rate and regular rhythm.      Pulses: Normal pulses.      Heart sounds: No murmur.   Pulmonary:      Comments: On room air  Appears more comfortable today  Talking in complete sentences, no accessory muscle use  Course bilateral breath sounds      Chest:      Chest wall: No tenderness.   Abdominal:      General: Abdomen is flat. Bowel sounds are normal. There is distension.      Tenderness: There is no abdominal tenderness. There is no guarding or rebound.   Musculoskeletal:      Right lower leg: Edema (3+) present.      Left lower leg: Edema (2+) present.   Lymphadenopathy:      Cervical: No cervical adenopathy.   Skin:     General: Skin is warm and dry.      Capillary Refill: Capillary refill takes less than 2 seconds.   Neurological:      General: No focal deficit present.      Mental Status: He is alert and oriented to person, place, and time. Mental status is at baseline.   Psychiatric:         Mood and Affect: Mood normal.         Significant Labs: All pertinent labs within the past 24 hours have been reviewed.    Significant Imaging: I have reviewed all pertinent imaging results/findings within the past 24 hours.

## 2020-10-01 NOTE — PLAN OF CARE
Problem: Physical Therapy Goal  Goal: Physical Therapy Goal  Description: Goals to be met by: 10/13/20     Patient will increase functional independence with mobility by performin. Supine to sit with Moderate Assistance.  2. Sit to supine with Moderate Assistance.  3. Sit to stand transfer with Moderate Assistance, with ankit-walker PRN, maintaining Pacemaker precautions.  4. Bed to chair transfer with Moderate Assistance, with ankit-walker PRN, maintaining Pacemaker precautions.  5. Gait  x 50 feet with Moderate Assistance, with ankit-walker PRN, maintaining Pacemaker precautions.   6. Ascend/Descend 6 inch curb step with Moderate Assistance, with ankit-walker PRN, maintaining Pacemaker precautions.  7. Lower extremity exercise program x 20 reps per handout, with assistance as needed.    Outcome: Ongoing, Progressing     PT goals established.

## 2020-10-01 NOTE — SUBJECTIVE & OBJECTIVE
Interval History: Feeling a bit better but still dyspneic during our conversation. Diuresed marginally overnight. A few runs of NSVT as well.     Continuous Infusions:  Scheduled Meds:   carvediloL  12.5 mg Oral BID    cephALEXin  500 mg Oral Q8H    furosemide (LASIX) IV  80 mg Intravenous Q12H    gabapentin  100 mg Oral TID    heparin (porcine)  7,500 Units Subcutaneous Q8H    pravastatin  80 mg Oral Daily    sacubitriL-valsartan  1 tablet Oral BID     PRN Meds:acetaminophen, dextrose 50%, dextrose 50%, glucagon (human recombinant), glucose, glucose, melatonin, polyethylene glycol, sodium chloride 0.9%    Review of patient's allergies indicates:  No Known Allergies  Objective:     Vital Signs (Most Recent):  Temp: 98 °F (36.7 °C) (10/01/20 0427)  Pulse: 62 (10/01/20 0600)  Resp: 20 (10/01/20 0427)  BP: 107/60 (10/01/20 0427)  SpO2: 96 % (10/01/20 0427) Vital Signs (24h Range):  Temp:  [97.8 °F (36.6 °C)-99.5 °F (37.5 °C)] 98 °F (36.7 °C)  Pulse:  [60-68] 62  Resp:  [16-25] 20  SpO2:  [90 %-100 %] 96 %  BP: (107-138)/(58-77) 107/60     Patient Vitals for the past 72 hrs (Last 3 readings):   Weight   09/30/20 0927 (!) 159.2 kg (351 lb)   09/29/20 1500 (!) 159.4 kg (351 lb 6.6 oz)   09/28/20 1245 (!) 158.8 kg (350 lb)     Body mass index is 41.62 kg/m².      Intake/Output Summary (Last 24 hours) at 10/1/2020 0739  Last data filed at 10/1/2020 0500  Gross per 24 hour   Intake 1498 ml   Output 2225 ml   Net -727 ml        Telemetry: NSR with occasional NSVT    Physical Exam  Vitals signs and nursing note reviewed.   Constitutional:       Appearance: He is well-developed.   HENT:      Head: Normocephalic.   Eyes:      Pupils: Pupils are equal, round, and reactive to light.   Neck:      Musculoskeletal: Normal range of motion and neck supple.      Comments: + JVD.  Cardiovascular:      Rate and Rhythm: Normal rate and regular rhythm.      Heart sounds: Murmur present.   Pulmonary:      Effort: Pulmonary effort is  normal.      Breath sounds: Normal breath sounds.   Abdominal:      General: Bowel sounds are normal. There is distension.      Palpations: Abdomen is soft.   Musculoskeletal: Normal range of motion.      Right lower leg: Edema present.      Left lower leg: Edema present.   Skin:     General: Skin is warm and dry.   Neurological:      Mental Status: He is alert and oriented to person, place, and time.   Psychiatric:         Behavior: Behavior normal.       Significant Labs:  CBC:  Recent Labs   Lab 09/30/20  0303 10/01/20  0259 10/01/20  0435   WBC 6.86 7.46 6.94   RBC 4.27* 4.52* 4.46*   HGB 10.0* 10.5* 10.4*   HCT 34.3* 37.3* 36.7*    226 208   MCV 80* 83 82   MCH 23.4* 23.2* 23.3*   MCHC 29.2* 28.2* 28.3*     BNP:  Recent Labs   Lab 09/29/20  1539   *     CMP:  Recent Labs   Lab 09/29/20  1539 09/29/20  2131 09/30/20  0303 09/30/20  1606 10/01/20  0259 10/01/20  0435    111* 101 87 101  101 99   CALCIUM 8.5* 8.4* 8.3* 8.7 8.4*  8.4* 8.5*   ALBUMIN 2.8* 2.7* 2.7*  --  2.8*  --    PROT 6.9  --  6.7  --  7.4  --     142 142 142 145  145 145   K 3.2* 3.8 3.8 4.0 3.9  3.9 4.0   CO2 34* 33* 31* 35* 36*  36* 33*   CL 98 100 100 100 100  100 101   BUN 18 17 17 16 15  15 15   CREATININE 1.1 1.2 1.1 1.2 1.0  1.0 1.0   ALKPHOS 87  --  81  --  84  --    ALT 32  --  27  --  21  --    AST 36  --  29  --  22  --    BILITOT 1.4*  --  1.1*  --  1.0  --       Coagulation:   Recent Labs   Lab 09/25/20  0940   INR 1.2   APTT 30.7     LDH:  No results for input(s): LDH in the last 72 hours.  Microbiology:  Microbiology Results (last 7 days)     ** No results found for the last 168 hours. **        I have reviewed all pertinent labs within the past 24 hours.    Estimated Creatinine Clearance: 122 mL/min (based on SCr of 1 mg/dL).    Diagnostic Results:  I have reviewed all pertinent imaging results/findings within the past 24 hours.

## 2020-10-01 NOTE — PROGRESS NOTES
[facility]  Heart Transplant  Progress Note    Patient Name: Papito Bhakta  MRN: [id number]  Admission Date: 9/28/2020  Hospital Length of Stay: 1 days  Attending Physician: Fara Moyer MD  Primary Care Provider: Brynn To MD  Principal Problem:Acute on chronic combined systolic and diastolic heart failure    Subjective:     Interval History: Feeling a bit better but still dyspneic during our conversation. Diuresed marginally overnight. A few runs of NSVT as well.     Continuous Infusions:  Scheduled Meds:   carvediloL  12.5 mg Oral BID    cephALEXin  500 mg Oral Q8H    furosemide (LASIX) IV  80 mg Intravenous Q12H    gabapentin  100 mg Oral TID    heparin (porcine)  7,500 Units Subcutaneous Q8H    pravastatin  80 mg Oral Daily    sacubitriL-valsartan  1 tablet Oral BID     PRN Meds:acetaminophen, dextrose 50%, dextrose 50%, glucagon (human recombinant), glucose, glucose, melatonin, polyethylene glycol, sodium chloride 0.9%    Review of patient's allergies indicates:  No Known Allergies  Objective:     Vital Signs (Most Recent):  Temp: 98 °F (36.7 °C) (10/01/20 0427)  Pulse: 62 (10/01/20 0600)  Resp: 20 (10/01/20 0427)  BP: 107/60 (10/01/20 0427)  SpO2: 96 % (10/01/20 0427) Vital Signs (24h Range):  Temp:  [97.8 °F (36.6 °C)-99.5 °F (37.5 °C)] 98 °F (36.7 °C)  Pulse:  [60-68] 62  Resp:  [16-25] 20  SpO2:  [90 %-100 %] 96 %  BP: (107-138)/(58-77) 107/60     Patient Vitals for the past 72 hrs (Last 3 readings):   Weight   09/30/20 0927 (!) 159.2 kg (351 lb)   09/29/20 1500 (!) 159.4 kg (351 lb 6.6 oz)   09/28/20 1245 (!) 158.8 kg (350 lb)     Body mass index is 41.62 kg/m².      Intake/Output Summary (Last 24 hours) at 10/1/2020 0739  Last data filed at 10/1/2020 0500  Gross per 24 hour   Intake 1498 ml   Output 2225 ml   Net -727 ml        Telemetry: NSR with occasional NSVT    Physical Exam  Vitals signs and nursing note reviewed.   Constitutional:       Appearance: He is  well-developed.   HENT:      Head: Normocephalic.   Eyes:      Pupils: Pupils are equal, round, and reactive to light.   Neck:      Musculoskeletal: Normal range of motion and neck supple.      Comments: + JVD.  Cardiovascular:      Rate and Rhythm: Normal rate and regular rhythm.      Heart sounds: Murmur present.   Pulmonary:      Effort: Pulmonary effort is normal.      Breath sounds: Normal breath sounds.   Abdominal:      General: Bowel sounds are normal. There is distension.      Palpations: Abdomen is soft.   Musculoskeletal: Normal range of motion.      Right lower leg: Edema present.      Left lower leg: Edema present.   Skin:     General: Skin is warm and dry.   Neurological:      Mental Status: He is alert and oriented to person, place, and time.   Psychiatric:         Behavior: Behavior normal.       Significant Labs:  CBC:  Recent Labs   Lab 09/30/20  0303 10/01/20  0259 10/01/20  0435   WBC 6.86 7.46 6.94   RBC 4.27* 4.52* 4.46*   HGB 10.0* 10.5* 10.4*   HCT 34.3* 37.3* 36.7*    226 208   MCV 80* 83 82   MCH 23.4* 23.2* 23.3*   MCHC 29.2* 28.2* 28.3*     BNP:  Recent Labs   Lab 09/29/20  1539   *     CMP:  Recent Labs   Lab 09/29/20  1539 09/29/20  2131 09/30/20  0303 09/30/20  1606 10/01/20  0259 10/01/20  0435    111* 101 87 101  101 99   CALCIUM 8.5* 8.4* 8.3* 8.7 8.4*  8.4* 8.5*   ALBUMIN 2.8* 2.7* 2.7*  --  2.8*  --    PROT 6.9  --  6.7  --  7.4  --     142 142 142 145  145 145   K 3.2* 3.8 3.8 4.0 3.9  3.9 4.0   CO2 34* 33* 31* 35* 36*  36* 33*   CL 98 100 100 100 100  100 101   BUN 18 17 17 16 15  15 15   CREATININE 1.1 1.2 1.1 1.2 1.0  1.0 1.0   ALKPHOS 87  --  81  --  84  --    ALT 32  --  27  --  21  --    AST 36  --  29  --  22  --    BILITOT 1.4*  --  1.1*  --  1.0  --       Coagulation:   Recent Labs   Lab 09/25/20  0940   INR 1.2   APTT 30.7     LDH:  No results for input(s): LDH in the last 72 hours.  Microbiology:  Microbiology Results (last 7 days)      ** No results found for the last 168 hours. **        I have reviewed all pertinent labs within the past 24 hours.    Estimated Creatinine Clearance: 122 mL/min (based on SCr of 1 mg/dL).    Diagnostic Results:  I have reviewed all pertinent imaging results/findings within the past 24 hours.    Assessment and Plan:     64 y/o male with NICM, EF 18%, HTN, HLP, S/P Bi-V ICD(initially placed 02/13/2019-->removed 6/2020 due to infection-->re-implanted 9/28), who is admitted to Medicine for ADHF after his ICD re-implantation. Last RHC/LHC in 2018 with normal coronaries, PW: (7), PA: 42, CO THERM: 7.51, RA:  (1), LVEDP: 15. Echo today with EF 18%, Mod MR, Mild-Mod TR, LVIDD 7.17, TAPSE 1.91.  He states that he has had the diagnosis of heart failure since 1995. Currently volume overloaded on exam and states that he has been this way for a few months. He states that his legs have been edematous since his first ICD was removed. He has been admitted for HF exacerbation at least once in the last year. Currently describes NYHA class IV symptoms. Unable to lie flat comfortably. Denies CP, palpitations, syncope, presyncope, fevers, n/v/d.      * Acute on chronic combined systolic and diastolic heart failure  -NICM. EF 18%. NYHA class IV symptoms.   -Volume up on exam. Diuresed marginally. Would rec Lasix bolus followed by continuous gtt.  -GDMT: Continue Carvedilol 12.5mg BID, [id number](Hopefully he will agree to take it). Can restart spironolactone as well. If hypotension becomes an issue, can transition to metoprolol succinate from carvedilol to give more BP room for entresto.  -Echo 9/30: EF 18%, Mod MR, Mild-Mod TR, LVIDD 7.17, TAPSE 1.91.      Boris Valiente, NP  Heart Transplant  [facility]

## 2020-10-01 NOTE — PT/OT/SLP EVAL
Occupational Therapy   Evaluation    Name: Papito Bhakta  MRN: [id number]  Admitting Diagnosis:  Acute on chronic combined systolic and diastolic heart failure     3 Days Post-Op  Pre-op Diagnosis: Chronic combined systolic and diastolic congestive heart failure [I50.42]NICM (nonischemic cardiomyopathy) [I42.8]    Procedure(s):  INSERTION, ICD, BIVENTRICULAR     Recommendations:     Discharge Recommendations: nursing facility, skilled  Discharge Equipment Recommendations:  (TBD)  Barriers to discharge:  (Patient requiring increased assist with ADLs and mobility)    Assessment:     Papito Bhakta is a 65 y.o. male with a medical diagnosis of Acute on chronic combined systolic and diastolic heart failure. He presents with the following performance deficits affecting function: weakness, impaired endurance, impaired self care skills, impaired functional mobilty, impaired balance, decreased coordination, decreased upper extremity function, gait instability, decreased lower extremity function, edema, decreased safety awareness, impaired cognition, impaired cardiopulmonary response to activity, decreased ROM. Patient required encouragement to participate in therapy evaluation and EOB activity. Patient presents with poor safety awareness and not receptive to therapists instruction at times during session, especially regarding pacemaker precautions. At this time, patient will continue to benefit from acute skilled therapy intervention to address deficits/underlying impairments and progress towards prior level of function. After discharge, patient would benefit from continued skilled therapy intervention at SNF to progress more towards independence in ADLs and functional mobility before going home.    Rehab Prognosis: Good; patient would benefit from acute skilled OT services to address these deficits and reach maximum level of function.       Plan:     Patient to be seen 4 x/week to address the above listed problems via  "self-care/home management, therapeutic activities, therapeutic exercises  · Plan of Care Expires: 11/29/20  · Plan of Care Reviewed with: patient    Subjective     Chief Complaint: pacemaker precautions  Patient/Family Comments/goals: "Yall don't understand. I do things my own way at home"    Occupational Profile:  Living Environment: Patient lives with his daughter and grandchild in [facility] with 0 SHIRA. Patient has a tub/shower with bench and grab bar.  Previous level of function: Daughter assists with getting legs in and out of bed 2* LE edema. Patient reports independence with ADLs.   Roles and Routines: Does not drive; does not work  Equipment Used at Home: bath bench, grab bar  Assistance upon Discharge: Family will be able to provide assist when not at work    Pain/Comfort:  · Pain Rating 1: 0/10    Patients cultural, spiritual, Adventist conflicts given the current situation: no    Objective:     Communicated with: RN prior to session. Patient found HOB elevated with oxygen, telemetry upon OT entry to room. PT present for co-evaluation as appropriate for patient.    General Precautions: Standard, fall, pacemaker   Orthopedic Precautions:N/A   Braces: (slight and swathe at night)     Occupational Performance:    Bed Mobility:    · Patient completed Supine to Sit with maximal assistance and 2 persons  · Patient completed Sit to Supine with maximal assistance and 2 persons    Functional Mobility/Transfers:  · Patient completed Sit <> Stand Transfer with maximal assistance and of 2 persons with no assistive device   · Multiple attempts at sit > stand before being able to achieve full standing position  · Cues provided for maintaining R pacemaker precautions and OT supported R arm to prevent patient from pushing through R UE  · Functional Mobility: Not assessed  · Patient declined transfer to chair 2* patient reporting chairs are too low for him to get out of without being able to use his R UE    Balance:   · Sitting: " SBA  · Standing: mod assist of 2    Activities of Daily Living:  · Grooming: stand by assistance sitting EOB; patient unable to perform in standing 2* assist needed for balance    Cognitive/Visual Perceptual:  Cognitive/Psychosocial Skills:     -       Follows Commands/attention: Follows multistep commands  -       Communication: clear/fluent  -       Memory: No Deficits noted  -       Safety awareness/insight to disability: impaired   -       Mood/Affect/Coping skills/emotional control: Cooperative; not receptive to therapist feedback at times    Physical Exam:  Postural examination/scapula alignment:   -       Rounded shoulders  -       Forward head  Edema:    -        B LEs  Sensation:    -       Intact  Dominant hand:    -       Left  Upper Extremity Range of Motion:     -       Right Upper Extremity: Not assessed 2* pacemaker precautions  -       Left Upper Extremity: WFL  Upper Extremity Strength:   -       Right Upper Extremity: Not assessed 2* pacemaker precautions  -       Left Upper Extremity: WFL   Strength:    -       Right Upper Extremity: WFL  -       Left Upper Extremity: WFL  Fine Motor Coordination:    -       Intact  Gross motor coordination: WFL    AMPAC 6 Click ADL:  AMPAC Total Score: 15    Treatment & Education:   Educated patient on pacemaker precautions at start of session. Required reminders/cues throughout session.    Therapist provided facilitation and instruction of proper body mechanics and fall prevention strategies during tasks listed above.   Instructed patient to use call light to have nursing staff assist with needs/transfers.   Discussed OT POC and answered all questions within OT scope of practice.   Whiteboard updated   Education:    Patient left HOB elevated with all lines intact and call button in reach    GOALS:   Multidisciplinary Problems     Occupational Therapy Goals        Problem: Occupational Therapy Goal    Goal Priority Disciplines Outcome Interventions    Occupational Therapy Goal     OT, PT/OT Ongoing, Progressing    Description: Goals to be met by: 10/14/2020    Patient will increase functional independence with ADLs by performing:    UE Dressing with Supervision.  LE Dressing with Minimal Assistance.  Grooming while standing with Contact Guard Assistance.  Supine to sit with Minimal Assistance.  Toilet transfer to toilet with Minimal Assistance.                     History:     Past Medical History:   Diagnosis Date    Anticoagulant long-term use     Aspirin    CHF (congestive heart failure)     Hyperlipidemia     Hypertension     NICM (nonischemic cardiomyopathy) 6/16/2020    Obesity        Past Surgical History:   Procedure Laterality Date    INSERTION OF BIVENTRICULAR IMPLANTABLE CARDIOVERTER-DEFIBRILLATOR (ICD) N/A 2/13/2019    Procedure: INSERTION, ICD, BIVENTRICULAR;  Surgeon: Shailesh Eng MD;  Location: [facility] CATH LAB;  Service: Cardiology;  Laterality: N/A;  RN PRE OP 2-6-19  1ST CASE PER  RADHA. NOTIFIED RADHA THAT ANESTHESIA IS NOT PITO FOR 1ST CASE START-LO    INSERTION OF BIVENTRICULAR IMPLANTABLE CARDIOVERTER-DEFIBRILLATOR (ICD) N/A 9/28/2020    Procedure: INSERTION, ICD, BIVENTRICULAR;  Surgeon: Jim Kwong MD;  Location: [facility] EP LAB;  Service: Cardiology;  Laterality: N/A;  NICM, CRT-D, SJM,, MAC, DM, 3 Prep*Wearing LifeVest*    oral extraction  11/2018    TESTICLE SURGERY         Time Tracking:     OT Date of Treatment: 10/01/20  OT Start Time: 0903  OT Stop Time: 0930  OT Total Time (min): 27 min    Billable Minutes:Evaluation 15  Self Care/Home Management 12    Elaine Zamudio, OT  10/1/2020

## 2020-10-01 NOTE — PT/OT/SLP EVAL
Physical Therapy Evaluation    Patient Name:  Papito Bhakta   MRN:  [id number]    Recommendations:     Discharge Recommendations:  nursing facility, skilled   Discharge Equipment Recommendations: (TBD)   Barriers to discharge: Inaccessible home and Decreased caregiver support    Assessment:     Papito Bhakta is a 65 y.o. male admitted with a medical diagnosis of Acute on chronic combined systolic and diastolic heart failure.  Pt is s/p ICD implantation, as of 9/28/20.      Upon evaluation, pt requires MaxA x 2 persons with all functional mobility assessed.  Highest level of function upon evaluation is sit<>stand.  Unable to address gait training at this time sec to decreased safety, but also pt refused to don hospital socks with non-skid bottoms.  Therefore discussed with pt to have his daughter bring him proper footwear.  Currently with R UE Pacemaker precautions, verbally reviewed with the patient.  Patient was non-compliant with precautions throughout evaluation.  He presents with the following impairments/functional limitations:  weakness, impaired endurance, impaired self care skills, impaired functional mobilty, gait instability, impaired balance, impaired cognition, decreased upper extremity function, decreased safety awareness, impaired skin, edema, impaired cardiopulmonary response to activity, orthopedic precautions.    Rehab Prognosis: Good; patient would benefit from acute skilled PT services to address these deficits and reach maximum level of function.    Recent Surgery: Procedure(s) (LRB):  INSERTION, ICD, BIVENTRICULAR (N/A) 3 Days Post-Op    Plan:     During this hospitalization, patient to be seen 5 x/week to address the identified rehab impairments via gait training, therapeutic activities, therapeutic exercises, neuromuscular re-education and progress toward the following goals:    · Plan of Care Expires:  10/27/20    Subjective     Chief Complaint: Not being able to use his R UE  Patient/Family  "Comments/goals: "Regular chairs don't have arms on it."  Pain/Comfort:  · Pain Rating 1: 0/10    Patients cultural, spiritual, Faith conflicts given the current situation: no    Living Environment:  Pt lives with daughter and grandchild, [facility], 0 SHIRA.  Prior to admission, patients level of function was I with gait, required A with bed mobility, Mod I with ADLs.  Equipment used at home: bath bench, grab bar.  DME owned (not currently used): none.  Upon discharge, patient will have assistance from daughter.    Objective:     Communicated with nursing prior to session.  Patient found supine with oxygen, peripheral IV, PICC line, telemetry  upon PT entry to room.    General Precautions: Standard, fall, respiratory, pacemaker   Orthopedic Precautions:RUE partial weight bearing(No > than 5 lbs on R UE)   Braces: N/A(sling discontinued)     Exams:    · Fine Motor Coordination:    · -       Intact  · Gross Motor Coordination:  WFL  · Postural Exam:  Patient presented with the following abnormalities:    · -       No postural abnormalities identified  · Sensation:    · -       Intact  · Skin Integrity/Edema:      · -       Skin integrity: Dry  · -       Edema: Pitting B LEs  · RUE ROM: WFL  · RUE Strength: not tested  · LUE ROM: WFL  · LUE Strength: WFL  · RLE ROM: WFL  · RLE Strength: WFL  · LLE ROM: WFL  · LLE Strength: WFL    Functional Mobility:  · Bed Mobility:     · Scooting: maximal assistance and of 2 persons  · Supine to Sit: maximal assistance and of 2 persons  · Sit to Supine: maximal assistance and of 2 persons    · Transfers:     · Sit to Stand:  maximal assistance and of 2 persons with increased time taken, multiple attempts to stand, continued use of R UE despite verbal and tactile cuing to inhibit use, ed on anterior weight shift    · Balance: I: static sitting balance EOB; ModA x 2 persons: static stance - with weight placed through RW B UEs - with ModA to inhibit - ant lean in stance    Therapeutic " Activities and Exercises:   Whiteboard updated    On 3LPM throughout eval - noted wheezing and SOB       AM-PAC 6 CLICK MOBILITY  Total Score:9     Patient left supine with all lines intact, call button in reach, nursing notified and PCT present.    GOALS:   Multidisciplinary Problems     Physical Therapy Goals        Problem: Physical Therapy Goal    Goal Priority Disciplines Outcome Goal Variances Interventions   Physical Therapy Goal     PT, PT/OT Ongoing, Progressing     Description: Goals to be met by: 10/13/20     Patient will increase functional independence with mobility by performin. Supine to sit with Moderate Assistance.  2. Sit to supine with Moderate Assistance.  3. Sit to stand transfer with Moderate Assistance, with ankit-walker PRN, maintaining Pacemaker precautions.  4. Bed to chair transfer with Moderate Assistance, with ankit-walker PRN, maintaining Pacemaker precautions.  5. Gait  x 50 feet with Moderate Assistance, with ankit-walker PRN, maintaining Pacemaker precautions.   6. Ascend/Descend 6 inch curb step with Moderate Assistance, with ankit-walker PRN, maintaining Pacemaker precautions.  7. Lower extremity exercise program x 20 reps per handout, with assistance as needed.                     History:     Past Medical History:   Diagnosis Date    Anticoagulant long-term use     Aspirin    CHF (congestive heart failure)     Hyperlipidemia     Hypertension     NICM (nonischemic cardiomyopathy) 2020    Obesity        Past Surgical History:   Procedure Laterality Date    INSERTION OF BIVENTRICULAR IMPLANTABLE CARDIOVERTER-DEFIBRILLATOR (ICD) N/A 2019    Procedure: INSERTION, ICD, BIVENTRICULAR;  Surgeon: Shailesh Eng MD;  Location: [facility] CATH LAB;  Service: Cardiology;  Laterality: N/A;  RN PRE OP 2-19  1ST CASE PER  RADHA. NOTIFIED RADHA THAT ANESTHESIA IS NOT PITO FOR 1ST CASE START-LO    INSERTION OF BIVENTRICULAR IMPLANTABLE CARDIOVERTER-DEFIBRILLATOR (ICD) N/A  9/28/2020    Procedure: INSERTION, ICD, BIVENTRICULAR;  Surgeon: Jim Kwong MD;  Location: [facility] EP LAB;  Service: Cardiology;  Laterality: N/A;  NICM, CRT-D, SJM,, MAC, DM, 3 Prep*Wearing LifeVest*    oral extraction  11/2018    TESTICLE SURGERY         Time Tracking:     PT Received On: 10/01/20  PT Start Time: 0900     PT Stop Time: 0931  PT Total Time (min): 31 min     Billable Minutes: Evaluation 8 and Therapeutic Activity 23      Gela Hunt, PT  10/01/2020

## 2020-10-01 NOTE — ASSESSMENT & PLAN NOTE
-NICM. EF 18%. NYHA class IV symptoms.   -Volume up on exam. Diuresed marginally. Would rec Lasix bolus followed by continuous gtt.  -GDMT: Continue Carvedilol 12.5mg BID, [id number](Hopefully he will agree to take it). Can restart spironolactone as well. If hypotension becomes an issue, can transition to metoprolol succinate from carvedilol to give more BP room for entresto.  -Echo 9/30: EF 18%, Mod MR, Mild-Mod TR, LVIDD 7.17, TAPSE 1.91.

## 2020-10-01 NOTE — PLAN OF CARE
Pt free of falls and injury. Fall precautions remain in place. Pt remains on O2 2L nasal cannula, continues to refuse BiPAP at night. NSR on telemetry. Educated pt on importance of accurate I/Os. Pt voiding per urinal. Pt is being diuresed with IV lasix. No signs of bleeding near ICD incision site, dressing remains clean, dry, and intact. R arm remains immobilized in sling. Plan of care reviewed with pt. Pt resting comfortably with no complaints of pain. Pt VSS, no distress, will continue to monitor.

## 2020-10-01 NOTE — PLAN OF CARE
Pt being diuresed with 80mg lasix IV push q6h. Plan of care discussed with patient. Fall precautions in place. Patient has no complaints of pain. Discussed medications and care. Patient has no questions at this time. Will continue to monitor.

## 2020-10-01 NOTE — ASSESSMENT & PLAN NOTE
Non-O2 dependent with need for BiPAP following bi-V ICD placement. Subsequently weaned to RA with diuresis. 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 CXR with pulmonary edema, pleural effusions, and cardiomegaly. Received 2 doses of 60 mg IV lasix since procedure with good urine output.  DDX: Acute heart failure exacerbation , ACS (less likely), COPD (no history of this but patient does have smoking history), PNA (no fevers, chills)    -   - check troponin to r/o ischemia  - lasix 80 IV TID  - continue home carvedilol 12.5 BID; will deescalate if needed for entresto  - entresto   - hold aldactone   - HTS consultation per EP recs, appreciate recommendations  - K>4, Mg >2 - replete as needed

## 2020-10-02 LAB
ALBUMIN SERPL BCP-MCNC: 2.5 G/DL (ref 3.5–5.2)
ALP SERPL-CCNC: 76 U/L (ref 55–135)
ALT SERPL W/O P-5'-P-CCNC: 15 U/L (ref 10–44)
ANION GAP SERPL CALC-SCNC: 10 MMOL/L (ref 8–16)
ANION GAP SERPL CALC-SCNC: 9 MMOL/L (ref 8–16)
AST SERPL-CCNC: 21 U/L (ref 10–40)
BASOPHILS # BLD AUTO: 0.04 K/UL (ref 0–0.2)
BASOPHILS NFR BLD: 0.6 % (ref 0–1.9)
BILIRUB SERPL-MCNC: 1 MG/DL (ref 0.1–1)
BUN SERPL-MCNC: 14 MG/DL (ref 8–23)
CALCIUM SERPL-MCNC: 8.4 MG/DL (ref 8.7–10.5)
CALCIUM SERPL-MCNC: 8.6 MG/DL (ref 8.7–10.5)
CALCIUM SERPL-MCNC: 8.6 MG/DL (ref 8.7–10.5)
CALCIUM SERPL-MCNC: 8.9 MG/DL (ref 8.7–10.5)
CHLORIDE SERPL-SCNC: 94 MMOL/L (ref 95–110)
CHLORIDE SERPL-SCNC: 97 MMOL/L (ref 95–110)
CHLORIDE SERPL-SCNC: 99 MMOL/L (ref 95–110)
CHLORIDE SERPL-SCNC: 99 MMOL/L (ref 95–110)
CO2 SERPL-SCNC: 31 MMOL/L (ref 23–29)
CO2 SERPL-SCNC: 38 MMOL/L (ref 23–29)
CO2 SERPL-SCNC: 38 MMOL/L (ref 23–29)
CO2 SERPL-SCNC: 41 MMOL/L (ref 23–29)
CREAT SERPL-MCNC: 0.9 MG/DL (ref 0.5–1.4)
CREAT SERPL-MCNC: 1.1 MG/DL (ref 0.5–1.4)
DIFFERENTIAL METHOD: ABNORMAL
EOSINOPHIL # BLD AUTO: 0.1 K/UL (ref 0–0.5)
EOSINOPHIL NFR BLD: 2 % (ref 0–8)
ERYTHROCYTE [DISTWIDTH] IN BLOOD BY AUTOMATED COUNT: 15.9 % (ref 11.5–14.5)
EST. GFR  (AFRICAN AMERICAN): >60 ML/MIN/1.73 M^2
EST. GFR  (NON AFRICAN AMERICAN): >60 ML/MIN/1.73 M^2
GLUCOSE SERPL-MCNC: 108 MG/DL (ref 70–110)
GLUCOSE SERPL-MCNC: 83 MG/DL (ref 70–110)
GLUCOSE SERPL-MCNC: 83 MG/DL (ref 70–110)
GLUCOSE SERPL-MCNC: 95 MG/DL (ref 70–110)
HCT VFR BLD AUTO: 36.5 % (ref 40–54)
HGB BLD-MCNC: 10.2 G/DL (ref 14–18)
IMM GRANULOCYTES # BLD AUTO: 0.01 K/UL (ref 0–0.04)
IMM GRANULOCYTES NFR BLD AUTO: 0.1 % (ref 0–0.5)
LYMPHOCYTES # BLD AUTO: 1.5 K/UL (ref 1–4.8)
LYMPHOCYTES NFR BLD: 20.4 % (ref 18–48)
MAGNESIUM SERPL-MCNC: 1.6 MG/DL (ref 1.6–2.6)
MCH RBC QN AUTO: 22.9 PG (ref 27–31)
MCHC RBC AUTO-ENTMCNC: 27.9 G/DL (ref 32–36)
MCV RBC AUTO: 82 FL (ref 82–98)
MONOCYTES # BLD AUTO: 1.1 K/UL (ref 0.3–1)
MONOCYTES NFR BLD: 15 % (ref 4–15)
NEUTROPHILS # BLD AUTO: 4.4 K/UL (ref 1.8–7.7)
NEUTROPHILS NFR BLD: 61.9 % (ref 38–73)
NRBC BLD-RTO: 0 /100 WBC
PHOSPHATE SERPL-MCNC: 3 MG/DL (ref 2.7–4.5)
PLATELET # BLD AUTO: 222 K/UL (ref 150–350)
PMV BLD AUTO: 11.6 FL (ref 9.2–12.9)
POTASSIUM SERPL-SCNC: 3.7 MMOL/L (ref 3.5–5.1)
POTASSIUM SERPL-SCNC: 3.7 MMOL/L (ref 3.5–5.1)
POTASSIUM SERPL-SCNC: 4 MMOL/L (ref 3.5–5.1)
POTASSIUM SERPL-SCNC: 4.1 MMOL/L (ref 3.5–5.1)
PROT SERPL-MCNC: 6.5 G/DL (ref 6–8.4)
RBC # BLD AUTO: 4.45 M/UL (ref 4.6–6.2)
SODIUM SERPL-SCNC: 137 MMOL/L (ref 136–145)
SODIUM SERPL-SCNC: 145 MMOL/L (ref 136–145)
SODIUM SERPL-SCNC: 146 MMOL/L (ref 136–145)
SODIUM SERPL-SCNC: 146 MMOL/L (ref 136–145)
WBC # BLD AUTO: 7.11 K/UL (ref 3.9–12.7)

## 2020-10-02 PROCEDURE — 97535 SELF CARE MNGMENT TRAINING: CPT | Mod: HCNC

## 2020-10-02 PROCEDURE — 99233 PR SUBSEQUENT HOSPITAL CARE,LEVL III: ICD-10-PCS | Mod: HCNC,,, | Performed by: HOSPITALIST

## 2020-10-02 PROCEDURE — 97530 THERAPEUTIC ACTIVITIES: CPT | Mod: HCNC

## 2020-10-02 PROCEDURE — 85025 COMPLETE CBC W/AUTO DIFF WBC: CPT | Mod: HCNC

## 2020-10-02 PROCEDURE — 97112 NEUROMUSCULAR REEDUCATION: CPT | Mod: HCNC

## 2020-10-02 PROCEDURE — 63600175 PHARM REV CODE 636 W HCPCS: Mod: HCNC | Performed by: STUDENT IN AN ORGANIZED HEALTH CARE EDUCATION/TRAINING PROGRAM

## 2020-10-02 PROCEDURE — 25000003 PHARM REV CODE 250: Mod: HCNC | Performed by: NURSE PRACTITIONER

## 2020-10-02 PROCEDURE — 83735 ASSAY OF MAGNESIUM: CPT | Mod: HCNC

## 2020-10-02 PROCEDURE — 80048 BASIC METABOLIC PNL TOTAL CA: CPT | Mod: HCNC

## 2020-10-02 PROCEDURE — 25000003 PHARM REV CODE 250: Mod: HCNC | Performed by: HOSPITALIST

## 2020-10-02 PROCEDURE — 99900035 HC TECH TIME PER 15 MIN (STAT): Mod: HCNC

## 2020-10-02 PROCEDURE — 80053 COMPREHEN METABOLIC PANEL: CPT | Mod: HCNC

## 2020-10-02 PROCEDURE — 27000221 HC OXYGEN, UP TO 24 HOURS: Mod: HCNC

## 2020-10-02 PROCEDURE — 84100 ASSAY OF PHOSPHORUS: CPT | Mod: HCNC

## 2020-10-02 PROCEDURE — 80048 BASIC METABOLIC PNL TOTAL CA: CPT | Mod: 91,HCNC

## 2020-10-02 PROCEDURE — 94761 N-INVAS EAR/PLS OXIMETRY MLT: CPT | Mod: HCNC

## 2020-10-02 PROCEDURE — 25000003 PHARM REV CODE 250: Mod: HCNC | Performed by: STUDENT IN AN ORGANIZED HEALTH CARE EDUCATION/TRAINING PROGRAM

## 2020-10-02 PROCEDURE — 36415 COLL VENOUS BLD VENIPUNCTURE: CPT | Mod: HCNC

## 2020-10-02 PROCEDURE — 99233 SBSQ HOSP IP/OBS HIGH 50: CPT | Mod: HCNC,,, | Performed by: HOSPITALIST

## 2020-10-02 PROCEDURE — 27000190 HC CPAP FULL FACE MASK W/VALVE: Mod: HCNC

## 2020-10-02 PROCEDURE — 20600001 HC STEP DOWN PRIVATE ROOM: Mod: HCNC

## 2020-10-02 RX ORDER — MAGNESIUM SULFATE HEPTAHYDRATE 40 MG/ML
2 INJECTION, SOLUTION INTRAVENOUS ONCE
Status: COMPLETED | OUTPATIENT
Start: 2020-10-02 | End: 2020-10-02

## 2020-10-02 RX ORDER — POTASSIUM CHLORIDE 20 MEQ/1
40 TABLET, EXTENDED RELEASE ORAL ONCE
Status: COMPLETED | OUTPATIENT
Start: 2020-10-02 | End: 2020-10-02

## 2020-10-02 RX ADMIN — METOPROLOL TARTRATE 25 MG: 25 TABLET, FILM COATED ORAL at 09:10

## 2020-10-02 RX ADMIN — GABAPENTIN 100 MG: 100 CAPSULE ORAL at 02:10

## 2020-10-02 RX ADMIN — HEPARIN SODIUM 7500 UNITS: 5000 INJECTION INTRAVENOUS; SUBCUTANEOUS at 02:10

## 2020-10-02 RX ADMIN — MAGNESIUM SULFATE 2 G: 2 INJECTION INTRAVENOUS at 09:10

## 2020-10-02 RX ADMIN — GABAPENTIN 100 MG: 100 CAPSULE ORAL at 09:10

## 2020-10-02 RX ADMIN — SACUBITRIL AND VALSARTAN 1 TABLET: 49; 51 TABLET, FILM COATED ORAL at 09:10

## 2020-10-02 RX ADMIN — CEPHALEXIN 500 MG: 500 CAPSULE ORAL at 05:10

## 2020-10-02 RX ADMIN — HEPARIN SODIUM 7500 UNITS: 5000 INJECTION INTRAVENOUS; SUBCUTANEOUS at 05:10

## 2020-10-02 RX ADMIN — HEPARIN SODIUM 7500 UNITS: 5000 INJECTION INTRAVENOUS; SUBCUTANEOUS at 09:10

## 2020-10-02 RX ADMIN — CEPHALEXIN 500 MG: 500 CAPSULE ORAL at 02:10

## 2020-10-02 RX ADMIN — CEPHALEXIN 500 MG: 500 CAPSULE ORAL at 09:10

## 2020-10-02 RX ADMIN — FUROSEMIDE 80 MG: 10 INJECTION, SOLUTION INTRAVENOUS at 02:10

## 2020-10-02 RX ADMIN — PRAVASTATIN SODIUM 80 MG: 40 TABLET ORAL at 09:10

## 2020-10-02 RX ADMIN — FUROSEMIDE 15 MG/HR: 10 INJECTION, SOLUTION INTRAMUSCULAR; INTRAVENOUS at 04:10

## 2020-10-02 RX ADMIN — FUROSEMIDE 80 MG: 10 INJECTION, SOLUTION INTRAVENOUS at 05:10

## 2020-10-02 RX ADMIN — POTASSIUM CHLORIDE 40 MEQ: 1500 TABLET, EXTENDED RELEASE ORAL at 09:10

## 2020-10-02 NOTE — PLAN OF CARE
10/02/20 1632   Discharge Reassessment   Assessment Type Discharge Planning Reassessment   Provided patient/caregiver education on the expected discharge date and the discharge plan Yes   Do you have any problems affording any of your prescribed medications? No   Discharge Plan A Skilled Nursing Facility   Discharge Plan B Home with family;Home Health   DME Needed Upon Discharge  none   Anticipated Discharge Disposition SNF     Family spoke with care team and they do not want SNF. They would prefer home health. Will reevaluate d/c plan after the weekend.    Julie Haase RN  Case Management [phone]

## 2020-10-02 NOTE — SUBJECTIVE & OBJECTIVE
Interval History: No events overnight. UO 2.7 L, net negative 1.1. Total net neg 3 L for stay. HDS between 2-3 L nasal cannula, no complaints at this time. Has agreed to take entresto.     Review of Systems   Constitutional: Positive for unexpected weight change (weight gain ). Negative for activity change, chills, diaphoresis, fatigue and fever.   HENT: Negative for facial swelling and trouble swallowing.    Eyes: Negative for visual disturbance.   Respiratory: Negative for apnea, cough, choking, chest tightness, shortness of breath and wheezing.    Cardiovascular: Positive for leg swelling. Negative for chest pain and palpitations.   Gastrointestinal: Positive for abdominal distention. Negative for abdominal pain, blood in stool, constipation, diarrhea, nausea and vomiting.   Endocrine: Negative for cold intolerance, heat intolerance and polyuria.   Genitourinary: Negative for dysuria and hematuria.   Musculoskeletal: Negative for back pain and myalgias.   Skin: Negative for color change, pallor and wound.   Neurological: Negative for dizziness, tremors and weakness.   Hematological: Negative for adenopathy.   Psychiatric/Behavioral: Negative for agitation, behavioral problems and confusion.     Objective:     Vital Signs (Most Recent):  Temp: 98.6 °F (37 °C) (10/02/20 0900)  Pulse: 68 (10/02/20 1132)  Resp: (!) 24 (10/02/20 0922)  BP: (!) 114/59 (10/02/20 0900)  SpO2: 97 % (10/02/20 0922) Vital Signs (24h Range):  Temp:  [98 °F (36.7 °C)-99.8 °F (37.7 °C)] 98.6 °F (37 °C)  Pulse:  [60-80] 68  Resp:  [18-24] 24  SpO2:  [91 %-99 %] 97 %  BP: (109-136)/(59-73) 114/59     Weight: (!) 159.2 kg (351 lb)  Body mass index is 41.62 kg/m².    Intake/Output Summary (Last 24 hours) at 10/2/2020 1133  Last data filed at 10/2/2020 0900  Gross per 24 hour   Intake 1630 ml   Output 3600 ml   Net -1970 ml      Physical Exam  Vitals signs and nursing note reviewed.   Constitutional:       Appearance: Normal appearance. He is  obese. He is not ill-appearing, toxic-appearing or diaphoretic.   HENT:      Head: Normocephalic and atraumatic.      Mouth/Throat:      Mouth: Mucous membranes are moist.      Pharynx: No oropharyngeal exudate.   Eyes:      General: No scleral icterus.     Extraocular Movements: Extraocular movements intact.      Conjunctiva/sclera: Conjunctivae normal.      Pupils: Pupils are equal, round, and reactive to light.   Neck:      Musculoskeletal: Normal range of motion and neck supple.   Cardiovascular:      Rate and Rhythm: Normal rate and regular rhythm.      Pulses: Normal pulses.      Heart sounds: No murmur.   Pulmonary:      Comments: On room air  Appears more comfortable today  Talking in complete sentences, no accessory muscle use  Course bilateral breath sounds      Chest:      Chest wall: No tenderness.   Abdominal:      General: Abdomen is flat. Bowel sounds are normal. There is distension.      Tenderness: There is no abdominal tenderness. There is no guarding or rebound.   Musculoskeletal:      Right lower leg: Edema (3+) present.      Left lower leg: Edema (2+) present.   Lymphadenopathy:      Cervical: No cervical adenopathy.   Skin:     General: Skin is warm and dry.      Capillary Refill: Capillary refill takes less than 2 seconds.   Neurological:      General: No focal deficit present.      Mental Status: He is alert and oriented to person, place, and time. Mental status is at baseline.   Psychiatric:         Mood and Affect: Mood normal.         Significant Labs: All pertinent labs within the past 24 hours have been reviewed.    Significant Imaging: I have reviewed all pertinent imaging results/findings within the past 24 hours.

## 2020-10-02 NOTE — NURSING
Patient experienced 6 beats of v. Tach; asymptomatic. MD notified. Stated he would further investigate chart. No orders given at this time. Will continue to monitor.

## 2020-10-02 NOTE — SUBJECTIVE & OBJECTIVE
Interval History: Feeling a bit better today. Seems frustrated with his care over the last few months regarding medication changes/adjustments and current situation.     Scheduled Meds:   cephALEXin  500 mg Oral Q8H    furosemide (LASIX) IV  80 mg Intravenous Q6H    gabapentin  100 mg Oral TID    heparin (porcine)  7,500 Units Subcutaneous Q8H    metoprolol tartrate  25 mg Oral BID    pravastatin  80 mg Oral Daily    sacubitriL-valsartan  1 tablet Oral BID     PRN Meds:acetaminophen, dextrose 50%, dextrose 50%, glucagon (human recombinant), glucose, glucose, melatonin, polyethylene glycol, sodium chloride 0.9%    Review of patient's allergies indicates:  No Known Allergies  Objective:     Vital Signs (Most Recent):  Temp: 98.6 °F (37 °C) (10/02/20 0900)  Pulse: 68 (10/02/20 1132)  Resp: (!) 24 (10/02/20 0922)  BP: (!) 114/59 (10/02/20 0900)  SpO2: 97 % (10/02/20 0922) Vital Signs (24h Range):  Temp:  [98 °F (36.7 °C)-99.8 °F (37.7 °C)] 98.6 °F (37 °C)  Pulse:  [60-80] 68  Resp:  [18-24] 24  SpO2:  [91 %-99 %] 97 %  BP: (109-136)/(59-73) 114/59     Patient Vitals for the past 72 hrs (Last 3 readings):   Weight   09/30/20 0927 (!) 159.2 kg (351 lb)   09/29/20 1500 (!) 159.4 kg (351 lb 6.6 oz)     Body mass index is 41.62 kg/m².      Intake/Output Summary (Last 24 hours) at 10/2/2020 1224  Last data filed at 10/2/2020 0900  Gross per 24 hour   Intake 1630 ml   Output 3600 ml   Net -1970 ml        Telemetry: NSR with occasional NSVT    Physical Exam  Vitals signs and nursing note reviewed.   Constitutional:       Appearance: He is well-developed.   HENT:      Head: Normocephalic.   Eyes:      Pupils: Pupils are equal, round, and reactive to light.   Neck:      Musculoskeletal: Normal range of motion and neck supple.      Comments: + JVD.  Cardiovascular:      Rate and Rhythm: Normal rate and regular rhythm.      Heart sounds: Murmur present.   Pulmonary:      Effort: Pulmonary effort is normal.      Breath  sounds: Normal breath sounds.   Abdominal:      General: Bowel sounds are normal. There is distension.      Palpations: Abdomen is soft.   Musculoskeletal: Normal range of motion.      Right lower leg: Edema present.      Left lower leg: Edema present.   Skin:     General: Skin is warm and dry.   Neurological:      Mental Status: He is alert and oriented to person, place, and time.   Psychiatric:         Behavior: Behavior normal.       Significant Labs:  CBC:  Recent Labs   Lab 10/01/20  0259 10/01/20  0435 10/02/20  0514   WBC 7.46 6.94 7.11   RBC 4.52* 4.46* 4.45*   HGB 10.5* 10.4* 10.2*   HCT 37.3* 36.7* 36.5*    208 222   MCV 83 82 82   MCH 23.2* 23.3* 22.9*   MCHC 28.2* 28.3* 27.9*     BNP:  Recent Labs   Lab 09/29/20  1539   *     CMP:  Recent Labs   Lab 09/30/20  0303  10/01/20  0259 10/01/20  0435 10/01/20  2039 10/02/20  0514      < > 101  101 99 84 83  83   CALCIUM 8.3*   < > 8.4*  8.4* 8.5* 8.6* 8.6*  8.6*   ALBUMIN 2.7*  --  2.8*  --   --  2.5*   PROT 6.7  --  7.4  --   --  6.5      < > 145  145 145 146* 146*  146*   K 3.8   < > 3.9  3.9 4.0 3.7 3.7  3.7   CO2 31*   < > 36*  36* 33* 37* 38*  38*      < > 100  100 101 99 99  99   BUN 17   < > 15  15 15 15 14  14   CREATININE 1.1   < > 1.0  1.0 1.0 0.9 0.9  0.9   ALKPHOS 81  --  84  --   --  76   ALT 27  --  21  --   --  15   AST 29  --  22  --   --  21   BILITOT 1.1*  --  1.0  --   --  1.0    < > = values in this interval not displayed.      Coagulation:   No results for input(s): PT, INR, APTT in the last 168 hours.  LDH:  No results for input(s): LDH in the last 72 hours.  Microbiology:  Microbiology Results (last 7 days)     ** No results found for the last 168 hours. **        I have reviewed all pertinent labs within the past 24 hours.    Estimated Creatinine Clearance: 135.5 mL/min (based on SCr of 0.9 mg/dL).    Diagnostic Results:  I have reviewed all pertinent imaging results/findings within the  past 24 hours.

## 2020-10-02 NOTE — PROGRESS NOTES
[facility] Hospital Medicine  Progress Note    Patient Name: Papito Bhakta  MRN: [id number]  Patient Class: IP- Inpatient   Admission Date: 9/28/2020  Length of Stay: 2 days  Attending Physician: Fara Moyer MD  Primary Care Provider: Brynn To MD    [facility] Medicine Team: [facility] MED 3 Jennifer Ledezma MD    Subjective:     Principal Problem:Chronic combined systolic and diastolic congestive heart failure        HPI:  Mr. Bhakta is a 66 yo gentleman with PMH of NICM with AICD and recent R CRT-D placement 9/27, hypertension, HFpEF/HFrEF (EF 25% June 2020), hyperlipidemia, and obesity presenting for shortness of breath and hypoxia. On September 27 he underwent placement of cardiac resynchronization therapy pacemaker with Dr. Kwong. After procedure it was difficult to awaken patient. His pH was 7.26 and CO2 was elevated, and he was subsequently placed on BiPAP then weaned down to room air after diuresis. It was recommended he be admitted for volume overload in the setting of hypoxemia. He has been experiencing increased shortness of breath and lower extremity swelling for the past few months since his bi-V ICD was removed in June 2020 secondary to infection. He reports 2 pillow orthopnea, dyspnea ambulating from his room to the kitchen, and a 20# weight gain. Previously he was more active and able to do household chores and yard work. He has been compliant with his home medications and has been alternating between taking 80 mg of lasix once to twice daily though he was prescribed daily on discharge from the hospital in June. He denies chest pain, fevers, chills, weight loss, nausea, vomiting, diarrhea, melena/hematochezia, and dysuria.     ED/Post-Op Course:  On room air. CXR showed cardiomegaly, pulmonary edema, and pleural effusion. Received 60 mg IV lasix x2 with good urine output and improved respiratory status. CMP revealed increased CO2.     Overview/Hospital Course:  Admitted to  hospital medicine for management of acute hypoxemic respiratory failure likely secondary to acute on chronic combined HFpEF/HFrEF s/p Bi-V ICD placement. No further bipap requirements in hospital stay. CXR with cardiomegaly, cephalization, pleural effusions, and congestion. Receiving IV lasix with good urine output. HTS consulted. Started on entresto, lopressor, and continuing IV lasix. Remains stable on low flow nc    Interval History: No events overnight. UO 2.7 L, net negative 1.1. Total net neg 3 L for stay. HDS between 2-3 L nasal cannula, no complaints at this time. Has agreed to take entresto.     Review of Systems   Constitutional: Positive for unexpected weight change (weight gain ). Negative for activity change, chills, diaphoresis, fatigue and fever.   HENT: Negative for facial swelling and trouble swallowing.    Eyes: Negative for visual disturbance.   Respiratory: Negative for apnea, cough, choking, chest tightness, shortness of breath and wheezing.    Cardiovascular: Positive for leg swelling. Negative for chest pain and palpitations.   Gastrointestinal: Positive for abdominal distention. Negative for abdominal pain, blood in stool, constipation, diarrhea, nausea and vomiting.   Endocrine: Negative for cold intolerance, heat intolerance and polyuria.   Genitourinary: Negative for dysuria and hematuria.   Musculoskeletal: Negative for back pain and myalgias.   Skin: Negative for color change, pallor and wound.   Neurological: Negative for dizziness, tremors and weakness.   Hematological: Negative for adenopathy.   Psychiatric/Behavioral: Negative for agitation, behavioral problems and confusion.     Objective:     Vital Signs (Most Recent):  Temp: 98.6 °F (37 °C) (10/02/20 0900)  Pulse: 68 (10/02/20 1132)  Resp: (!) 24 (10/02/20 0922)  BP: (!) 114/59 (10/02/20 0900)  SpO2: 97 % (10/02/20 0922) Vital Signs (24h Range):  Temp:  [98 °F (36.7 °C)-99.8 °F (37.7 °C)] 98.6 °F (37 °C)  Pulse:  [60-80] 68  Resp:   [18-24] 24  SpO2:  [91 %-99 %] 97 %  BP: (109-136)/(59-73) 114/59     Weight: (!) 159.2 kg (351 lb)  Body mass index is 41.62 kg/m².    Intake/Output Summary (Last 24 hours) at 10/2/2020 1133  Last data filed at 10/2/2020 0900  Gross per 24 hour   Intake 1630 ml   Output 3600 ml   Net -1970 ml      Physical Exam  Vitals signs and nursing note reviewed.   Constitutional:       Appearance: Normal appearance. He is obese. He is not ill-appearing, toxic-appearing or diaphoretic.   HENT:      Head: Normocephalic and atraumatic.      Mouth/Throat:      Mouth: Mucous membranes are moist.      Pharynx: No oropharyngeal exudate.   Eyes:      General: No scleral icterus.     Extraocular Movements: Extraocular movements intact.      Conjunctiva/sclera: Conjunctivae normal.      Pupils: Pupils are equal, round, and reactive to light.   Neck:      Musculoskeletal: Normal range of motion and neck supple.   Cardiovascular:      Rate and Rhythm: Normal rate and regular rhythm.      Pulses: Normal pulses.      Heart sounds: No murmur.   Pulmonary:      Comments: On room air  Appears more comfortable today  Talking in complete sentences, no accessory muscle use  Course bilateral breath sounds      Chest:      Chest wall: No tenderness.   Abdominal:      General: Abdomen is flat. Bowel sounds are normal. There is distension.      Tenderness: There is no abdominal tenderness. There is no guarding or rebound.   Musculoskeletal:      Right lower leg: Edema (3+) present.      Left lower leg: Edema (2+) present.   Lymphadenopathy:      Cervical: No cervical adenopathy.   Skin:     General: Skin is warm and dry.      Capillary Refill: Capillary refill takes less than 2 seconds.   Neurological:      General: No focal deficit present.      Mental Status: He is alert and oriented to person, place, and time. Mental status is at baseline.   Psychiatric:         Mood and Affect: Mood normal.         Significant Labs: All pertinent labs within  the past 24 hours have been reviewed.    Significant Imaging: I have reviewed all pertinent imaging results/findings within the past 24 hours.      Assessment/Plan:      * Acute hypoxemic respiratory failure  Non-O2 dependent with need for BiPAP following bi-V ICD placement. Subsequently weaned to RA with diuresis. 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 CXR with pulmonary edema, pleural effusions, and cardiomegaly. Received 2 doses of 60 mg IV lasix since procedure with good urine output.  DDX: Acute heart failure exacerbation , ACS (less likely), COPD (no history of this but patient does have smoking history), PNA (no fevers, chills)    -   - lasix 80q6 hr  - repeat BMP this PM to check Cr  - lopressor 25 BID  - entresto 49-51  - hold aldactone, consider restarting tomorrow depending on bp  - HTS consultation per EP recs, appreciate recommendations  - K>4, Mg >2 - replete as needed      Chronic combined systolic and diastolic congestive heart failure  History of combined systolic and diastolic dysfunction, s/p CRT-D 9/28 with continued volume overload. Has AICD. Compliant with home medications.    - Please see Acute Hypoxemic Respiratory Failure      Essential hypertension  Home meds: carvedilol 12.5 BID, ramipiril, aldactone  - switched from carvedilol to lopressor 25 BID  - entresto 49-51  - hold aldactone, can consider restarting if blood pressure tolerates increased entresto dose      Hyperlipidemia  Continue home pravastatin    NICM (nonischemic cardiomyopathy)  Please see Acute Hypoxemic Respiratory Failure      Biventricular ICD (implantable cardioverter-defibrillator) in place  Placed 9/28 with Dr. Kwong.    - Keflex 500 TID for 5 days (end date 10/2)      VTE Risk Mitigation (From admission, onward)         Ordered     heparin (porcine) injection 7,500 Units  Every 8 hours      09/29/20 1417     IP VTE HIGH RISK PATIENT  Once       09/29/20 1417     Place sequential compression device  Until discontinued      09/29/20 1417                Discharge Planning   MARIIA: 10/3/2020     Code Status: Full Code   Is the patient medically ready for discharge?: No    Reason for patient still in hospital (select all that apply): Treatment  Discharge Plan A: Home with family, Home Health                  Jennifer Ledezma MD  Department of Hospital Medicine   [facility]                      10/03/2020                             STAFF PHYSICIAN NOTE                                   Attending Attestation for Rounds with Resident  I have reviewed and concur with the resident's history, physical, assessment, and plan.  I have personally interviewed and examined the patient at bedside and agree with the resident's findings.             66 yo gentleman with PMH of NICM with AICD and recent R CRT-D placement 9/27, hypertension, HFpEF/HFrEF (EF 18%) hyperlipidemia, and obesity presenting for shortness of breath and hypoxia s/p AICD placement transferred to medicine for HFE.  has been stable on low flow oxygen; getting diuresis and GDMT. Titrating meds and monitoring UO. On lasix gtt.                            Fara Moyer MD  Senior Hospitalist  [id number], [phone]

## 2020-10-02 NOTE — ASSESSMENT & PLAN NOTE
Home meds: carvedilol 12.5 BID, ramipiril, aldactone  - switched from carvedilol to lopressor 25 BID  - entresto 49-51  - hold aldactone, can consider restarting if blood pressure tolerates increased entresto dose

## 2020-10-02 NOTE — PT/OT/SLP PROGRESS
"Occupational Therapy   Treatment    Name: Papito Bhakta  MRN: [id number]  Admitting Diagnosis:  Acute hypoxemic respiratory failure      4 Days Post-Op  Pre-op Diagnosis: Chronic combined systolic and diastolic congestive heart failure [I50.42]NICM (nonischemic cardiomyopathy) [I42.8]    Procedure(s):  INSERTION, ICD, BIVENTRICULAR     Recommendations:     Discharge Recommendations: nursing facility, skilled  Discharge Equipment Recommendations: TBD  Barriers to discharge: Patient requiring increased assist with ADLs and functional mobility    Assessment:     Papito Bhakta is a 65 y.o. male with a medical diagnosis of Acute hypoxemic respiratory failure. He presents with the following performance deficits affecting function: weakness, impaired endurance, impaired self care skills, impaired functional mobilty, gait instability, impaired balance, pain, edema, decreased upper extremity function, decreased coordination, impaired cardiopulmonary response to activity, impaired coordination. Patient required encouragement to participate in therapy session. Patient required max cues for maintaining pacemaker precautions during bed mobility and sit <> stand transfers. At this time, patient will continue to benefit from acute skilled therapy intervention to address deficits/underlying impairments and progress towards prior level of function. After discharge, patient would benefit from continued skilled therapy intervention at SNF to progress more towards independence in ADLs and functional mobility before going home.    Rehab Prognosis:  Good; patient would benefit from acute skilled OT services to address these deficits and reach maximum level of function.       Plan:     Patient to be seen 4 x/week to address the above listed problems via self-care/home management, therapeutic activities, therapeutic exercises  · Plan of Care Expires: 11/29/20  · Plan of Care Reviewed with: patient, son    Subjective     Patient stated "yall " "come at the wrong times".    Pain/Comfort:  · Pain Rating 1: (Patient initially reported no pain but screamed in pain during bed mobility and reported pain in L hip)    Objective:     Communicated with: RN prior to session. Patient found HOB elevated with oxygen, telemetry upon OT entry to room. PT and PT student present for co-tx as appropriate for patient. Patient's son arrived in the middle of tx session.    General Precautions: Standard, fall, pacemaker   Orthopedic Precautions: N/A   Braces: (sling at night)     Occupational Performance:     Bed Mobility:    · Patient completed Supine to Sit with moderate assistance   · Cues provided for sequencing of steps and maintaining pacemaker precautions  · Patient completed lateral Scooting while sitting EOB with maximal assistance and 2 persons    Functional Mobility/Transfers:  · Patient completed Sit <> Stand Transfer with maximal assistance and of 2 persons with no assistive device   · PT on patient's R side to assist in maintaining pacemaker precautions and OT on patient's L side  · Functional Mobility: Patient attempted 1 side step along EOB with mod assist of 2 with hand-held assist    Activities of Daily Living:  · Feeding: independence sitting EOB  · Grooming: supervision sitting EOB; CGA in standing  · Lower Body Dressing: total assistance to edilberto non-skid socks sitting EOB    AMPA 6 Click ADL: 14    Treatment & Education:   Reviewed pacemaker precautions at start of session.   Therapist provided facilitation and instruction of proper body mechanics and fall prevention strategies during tasks listed above.   Instructed patient to sit in EOB daily to increase activity tolerance.   Instructed patient to use call light to have nursing staff assist with needs/transfers.   Discussed OT POC and answered all questions within OT scope of practice.   Whiteboard updated     Patient left sitting EOB eating lunch with all lines intact, call button in reach, RN " notified and patient's son presentEducation:      GOALS:   Multidisciplinary Problems     Occupational Therapy Goals        Problem: Occupational Therapy Goal    Goal Priority Disciplines Outcome Interventions   Occupational Therapy Goal     OT, PT/OT Ongoing, Progressing    Description: Goals to be met by: 10/14/2020    Patient will increase functional independence with ADLs by performing:    UE Dressing with Supervision.  LE Dressing with Minimal Assistance.  Grooming while standing with Contact Guard Assistance.  Supine to sit with Minimal Assistance.  Toilet transfer to toilet with Minimal Assistance.                     Time Tracking:     OT Date of Treatment: 10/02/20  OT Start Time: 1106  OT Stop Time: 1145  OT Total Time (min): 39 min    Billable Minutes:Self Care/Home Management 30  Therapeutic Activity 9    Elaine Zamudio OT  10/2/2020

## 2020-10-02 NOTE — PHARMACY MED REC
"Admission Medication Reconciliation - Pharmacy Consult Note    The home medication history was taken by Sita Zarate Pharmacy Tech.     Based on information gathered and subsequent review by the clinical pharmacist, the items below may need attention.     You may go to "Admission" then "Reconcile Home Medications" tabs to review and/or act upon these items.     Potentially problematic discrepancies with current MAR  o Patient IS taking the following which was not ordered upon admit  o Spironolactone 25 mg PO daily  o Aspirin 325 mg PO daily (consider 81 mg)  o Patient IS NOT taking the following which was ordered upon admit  o Gabapentin    Potential issues to be addressed PRIOR TO DISCHARGE  o Started Entresto and transition home carvedilol to metoprolol succinate per HTS    Please address this information as you see fit.  Feel free to contact us if you have any questions or require assistance.    Macario Zuluaga, Pharm.D., BCPS  [id number]                    .    .            "

## 2020-10-02 NOTE — ASSESSMENT & PLAN NOTE
Non-O2 dependent with need for BiPAP following bi-V ICD placement. Subsequently weaned to RA with diuresis. 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 CXR with pulmonary edema, pleural effusions, and cardiomegaly. Received 2 doses of 60 mg IV lasix since procedure with good urine output.  DDX: Acute heart failure exacerbation , ACS (less likely), COPD (no history of this but patient does have smoking history), PNA (no fevers, chills)    -   - lasix 80q6 hr  - repeat BMP this PM to check Cr  - lopressor 25 BID  - entresto 49-51  - hold aldactone, consider restarting tomorrow depending on bp  - HTS consultation per EP recs, appreciate recommendations  - K>4, Mg >2 - replete as needed

## 2020-10-02 NOTE — PLAN OF CARE
Pt remains free from fall and injury. Does not complain of any pain or discomfort at this time. Paced rhythm on tele; no S/S of cardiac distress noted. Patient is diuresing and adhering to fluid restrictions. Abx therapy maintained. VSS on 2.5L O2 per nasal cannula. Plan of care reviewed with patient. Call light within reach. Bed in lowest locked position. Will continue to monitor.

## 2020-10-02 NOTE — ASSESSMENT & PLAN NOTE
-NICM. EF 18%. NYHA class IV symptoms.   -Volume up on exam. Diuresed marginally. Would rec Lasix bolus followed by continuous gtt.  -GDMT: Continue Entresto 49-51. Can restart spironolactone as well. Transitioned to metoprolol tartrate. Would recommend metoprolol succinate in heart failure population. Would keep BB at low dose for now as he is decompensated. Can up-titrate very slowly as outpt once euvolemic.   -Echo 9/30: EF 18%, Mod MR, Mild-Mod TR, LVIDD 7.17, TAPSE 1.91.

## 2020-10-02 NOTE — PROGRESS NOTES
[facility]  Heart Transplant  Progress Note    Patient Name: Papito Bhakta  MRN: [id number]  Admission Date: 9/28/2020  Hospital Length of Stay: 2 days  Attending Physician: Fara Moyer MD  Primary Care Provider: Brynn To MD  Principal Problem:Acute hypoxemic respiratory failure    Subjective:     Interval History: Feeling a bit better today. Seems frustrated with his care over the last few months regarding medication changes/adjustments and current situation.     Scheduled Meds:   cephALEXin  500 mg Oral Q8H    furosemide (LASIX) IV  80 mg Intravenous Q6H    gabapentin  100 mg Oral TID    heparin (porcine)  7,500 Units Subcutaneous Q8H    metoprolol tartrate  25 mg Oral BID    pravastatin  80 mg Oral Daily    sacubitriL-valsartan  1 tablet Oral BID     PRN Meds:acetaminophen, dextrose 50%, dextrose 50%, glucagon (human recombinant), glucose, glucose, melatonin, polyethylene glycol, sodium chloride 0.9%    Review of patient's allergies indicates:  No Known Allergies  Objective:     Vital Signs (Most Recent):  Temp: 98.6 °F (37 °C) (10/02/20 0900)  Pulse: 68 (10/02/20 1132)  Resp: (!) 24 (10/02/20 0922)  BP: (!) 114/59 (10/02/20 0900)  SpO2: 97 % (10/02/20 0922) Vital Signs (24h Range):  Temp:  [98 °F (36.7 °C)-99.8 °F (37.7 °C)] 98.6 °F (37 °C)  Pulse:  [60-80] 68  Resp:  [18-24] 24  SpO2:  [91 %-99 %] 97 %  BP: (109-136)/(59-73) 114/59     Patient Vitals for the past 72 hrs (Last 3 readings):   Weight   09/30/20 0927 (!) 159.2 kg (351 lb)   09/29/20 1500 (!) 159.4 kg (351 lb 6.6 oz)     Body mass index is 41.62 kg/m².      Intake/Output Summary (Last 24 hours) at 10/2/2020 1224  Last data filed at 10/2/2020 0900  Gross per 24 hour   Intake 1630 ml   Output 3600 ml   Net -1970 ml        Telemetry: NSR with occasional NSVT    Physical Exam  Vitals signs and nursing note reviewed.   Constitutional:       Appearance: He is well-developed.   HENT:      Head: Normocephalic.   Eyes:       Pupils: Pupils are equal, round, and reactive to light.   Neck:      Musculoskeletal: Normal range of motion and neck supple.      Comments: + JVD.  Cardiovascular:      Rate and Rhythm: Normal rate and regular rhythm.      Heart sounds: Murmur present.   Pulmonary:      Effort: Pulmonary effort is normal.      Breath sounds: Normal breath sounds.   Abdominal:      General: Bowel sounds are normal. There is distension.      Palpations: Abdomen is soft.   Musculoskeletal: Normal range of motion.      Right lower leg: Edema present.      Left lower leg: Edema present.   Skin:     General: Skin is warm and dry.   Neurological:      Mental Status: He is alert and oriented to person, place, and time.   Psychiatric:         Behavior: Behavior normal.       Significant Labs:  CBC:  Recent Labs   Lab 10/01/20  0259 10/01/20  0435 10/02/20  0514   WBC 7.46 6.94 7.11   RBC 4.52* 4.46* 4.45*   HGB 10.5* 10.4* 10.2*   HCT 37.3* 36.7* 36.5*    208 222   MCV 83 82 82   MCH 23.2* 23.3* 22.9*   MCHC 28.2* 28.3* 27.9*     BNP:  Recent Labs   Lab 09/29/20  1539   *     CMP:  Recent Labs   Lab 09/30/20  0303  10/01/20  0259 10/01/20  0435 10/01/20  2039 10/02/20  0514      < > 101  101 99 84 83  83   CALCIUM 8.3*   < > 8.4*  8.4* 8.5* 8.6* 8.6*  8.6*   ALBUMIN 2.7*  --  2.8*  --   --  2.5*   PROT 6.7  --  7.4  --   --  6.5      < > 145  145 145 146* 146*  146*   K 3.8   < > 3.9  3.9 4.0 3.7 3.7  3.7   CO2 31*   < > 36*  36* 33* 37* 38*  38*      < > 100  100 101 99 99  99   BUN 17   < > 15  15 15 15 14  14   CREATININE 1.1   < > 1.0  1.0 1.0 0.9 0.9  0.9   ALKPHOS 81  --  84  --   --  76   ALT 27  --  21  --   --  15   AST 29  --  22  --   --  21   BILITOT 1.1*  --  1.0  --   --  1.0    < > = values in this interval not displayed.      Coagulation:   No results for input(s): PT, INR, APTT in the last 168 hours.  LDH:  No results for input(s): LDH in the last 72  hours.  Microbiology:  Microbiology Results (last 7 days)     ** No results found for the last 168 hours. **        I have reviewed all pertinent labs within the past 24 hours.    Estimated Creatinine Clearance: 135.5 mL/min (based on SCr of 0.9 mg/dL).    Diagnostic Results:  I have reviewed all pertinent imaging results/findings within the past 24 hours.    Assessment and Plan:     64 y/o male with NICM, EF 18%, HTN, HLP, S/P Bi-V ICD(initially placed 02/13/2019-->removed 6/2020 due to infection-->re-implanted 9/28), who is admitted to Medicine for ADHF after his ICD re-implantation. Last RHC/LHC in 2018 with normal coronaries, PW: (7), PA: 42, CO THERM: 7.51, RA:  (1), LVEDP: 15. Echo today with EF 18%, Mod MR, Mild-Mod TR, LVIDD 7.17, TAPSE 1.91.  He states that he has had the diagnosis of heart failure since 1995. Currently volume overloaded on exam and states that he has been this way for a few months. He states that his legs have been edematous since his first ICD was removed. He has been admitted for HF exacerbation at least once in the last year. Currently describes NYHA class IV symptoms. Unable to lie flat comfortably. Denies CP, palpitations, syncope, presyncope, fevers, n/v/d.      Acute on chronic combined systolic and diastolic heart failure  -NICM. EF 18%. NYHA class IV symptoms.   -Volume up on exam. Diuresed marginally. Would rec Lasix bolus followed by continuous gtt.  -GDMT: Continue Entresto 49-51. Can restart spironolactone as well. Transitioned to metoprolol tartrate. Would recommend metoprolol succinate in heart failure population. Would keep BB at low dose for now as he is decompensated. Can up-titrate very slowly as outpt once euvolemic.   -Echo 9/30: EF 18%, Mod MR, Mild-Mod TR, LVIDD 7.17, TAPSE 1.91.        Boris Valiente NP  Heart Transplant  [facility]

## 2020-10-02 NOTE — ASSESSMENT & PLAN NOTE
History of combined systolic and diastolic dysfunction, s/p CRT-D 9/28 with continued volume overload. Has AICD. Compliant with home medications.    - Please see Acute Hypoxemic Respiratory Failure

## 2020-10-02 NOTE — PT/OT/SLP PROGRESS
Physical Therapy Treatment    Patient Name:  Papito Bhakta   MRN:  [id number]    Recommendations:     Discharge Recommendations:  nursing facility, skilled   Discharge Equipment Recommendations: (TBD)   Barriers to discharge: Decreased caregiver support at current functional level     Assessment:     Papito Bhakta is a 65 y.o. male admitted with a medical diagnosis of Acute hypoxemic respiratory failure.  He presents with the following impairments/functional limitations:  weakness, impaired endurance, impaired self care skills, impaired functional mobilty, gait instability, impaired balance, impaired cardiopulmonary response to activity, pain, decreased lower extremity function, decreased upper extremity function, edema, impaired skin, decreased safety awareness. S/p ICD insertion 9/28/2020 with RUE pacemaker precautions in place. Pt demo'ing poor insight into current condition and decreased compliance with pacemaker precautions throughout session. He continues to require increased assist to complete functional mobility as well as increased cueing in attempt to maintain precautions. He was able to complete self-care tasks in standing this date, but was unable to progress mobility further due to increased difficulty advancing LEs when attempting steps. Pt would continue to benefit from skilled acute PT in order to address current deficits and progress functional mobility.     Rehab Prognosis: Good; patient would benefit from acute skilled PT services to address these deficits and reach maximum level of function.    Recent Surgery: Procedure(s) (LRB):  INSERTION, ICD, BIVENTRICULAR (N/A) 4 Days Post-Op    Plan:     During this hospitalization, patient to be seen 4 x/week to address the identified rehab impairments via gait training, therapeutic activities, therapeutic exercises, neuromuscular re-education and progress toward the following goals:    · Plan of Care Expires:  10/27/20    Subjective     Chief Complaint: L  "hip pain with attempt to assist pt with bed mobility   Patient/Family Comments/goals: "Y'all come at the wrong times."  Pain/Comfort:  · Pain Rating 1: (initially reported no pain, but pt then screaming out in pain when PT attempted to assist with management of LLE during bed mobility)  · Pain Addressed 1: Reposition, Distraction, Cessation of Activity      Objective:     Communicated with RN prior to session.  Patient found supine with oxygen, peripheral IV, telemetry upon PT entry to room.     General Precautions: Standard, fall, pacemaker   Orthopedic Precautions:N/A   Braces: Sling and swathe(RUE; at night)     Functional Mobility:  · Bed Mobility:     · Rolling Right: maximal assistance using side rail   · Scooting: maximal assistance and of 2 persons   · Seated scooting along EOB x2 reps with max cues for hand placement, appropriate weight-shifts, and technique  · Supine to Sit: moderate assistance   · With HOB elevated and using side rail   · Decreased attention to therapists cues for technique and maintaining ppm precautions with pt performing per his own technique instead  · Transfers:     · Sit to Stand:  maximal assistance and of 2 persons with hand-held assist  · Max cues for hand placement, LE placement, anterior weight-shift, use of forward momentum to assist with ease of transfer  · Gait: 1 R lateral step with modAx2 and HHAx2  · Increased difficulty weight-shifting appropriately and clearing LE to advance; thus, unable to progress further  · Balance:   · Standing to complete oral care with CGA for balance and at least unilateral UE support throughout       AM-PAC 6 CLICK MOBILITY  Turning over in bed (including adjusting bedclothes, sheets and blankets)?: 2  Sitting down on and standing up from a chair with arms (e.g., wheelchair, bedside commode, etc.): 2  Moving from lying on back to sitting on the side of the bed?: 2  Moving to and from a bed to a chair (including a wheelchair)?: 1  Need to walk " in hospital room?: 1  Climbing 3-5 steps with a railing?: 1  Basic Mobility Total Score: 9       Therapeutic Activities and Exercises:  Pt educated on role of PT and PT POC. Pt verbalized understanding.   Pt educated on RUE pacemaker precautions. Required max cues and repeated education throughout session on the importance of maintaining precautions. Pt repeatedly attempting to push and pull with RUE and required intermittent assist for management of RUE in order to assist with maintaining precautions.  Required increased encouragement and education on importance of participating in OOB activity with therapists' assist. Pt initially inattentive and becoming agitated with therapist's cues. However, pt gradually more receptive as session progressed.     Patient left seated EOB with all lines intact, call button in reach, RN notified and pt's son present. Pt's son monitoring pt and instructed to notify RN prior to leaving. RN notified of pt position and level of assist needed for return to supine.    GOALS:   Multidisciplinary Problems     Physical Therapy Goals        Problem: Physical Therapy Goal    Goal Priority Disciplines Outcome Goal Variances Interventions   Physical Therapy Goal     PT, PT/OT Ongoing, Progressing     Description: Goals to be met by: 10/13/20     Patient will increase functional independence with mobility by performin. Supine to sit with Moderate Assistance.  2. Sit to supine with Moderate Assistance.  3. Sit to stand transfer with Moderate Assistance, with ankit-walker PRN, maintaining Pacemaker precautions.  4. Bed to chair transfer with Moderate Assistance, with ankit-walker PRN, maintaining Pacemaker precautions.  5. Gait  x 50 feet with Moderate Assistance, with ankit-walker PRN, maintaining Pacemaker precautions.   6. Ascend/Descend 6 inch curb step with Moderate Assistance, with ankit-walker PRN, maintaining Pacemaker precautions.  7. Lower extremity exercise program x 20 reps per  handout, with assistance as needed.                     Time Tracking:     PT Received On: 10/02/20  PT Start Time: 1106     PT Stop Time: 1144  PT Total Time (min): 38 min     Billable Minutes: Therapeutic Activity 30 and Neuromuscular Re-education 8   (co-tx with OT performed this date due to level of skilled assist needed to ensure pt safety and accommodate for decreased activity tolerance)    Treatment Type: Treatment  PT/PTA: PT     PTA Visit Number: 0     Megan Colin PT, DPT   10/2/2020  [phone]

## 2020-10-03 LAB
ALBUMIN SERPL BCP-MCNC: 2.6 G/DL (ref 3.5–5.2)
ALP SERPL-CCNC: 85 U/L (ref 55–135)
ALT SERPL W/O P-5'-P-CCNC: 14 U/L (ref 10–44)
ANION GAP SERPL CALC-SCNC: 10 MMOL/L (ref 8–16)
ANION GAP SERPL CALC-SCNC: 11 MMOL/L (ref 8–16)
AST SERPL-CCNC: 20 U/L (ref 10–40)
BASOPHILS # BLD AUTO: 0.06 K/UL (ref 0–0.2)
BASOPHILS NFR BLD: 0.8 % (ref 0–1.9)
BILIRUB SERPL-MCNC: 1.2 MG/DL (ref 0.1–1)
BUN SERPL-MCNC: 14 MG/DL (ref 8–23)
BUN SERPL-MCNC: 17 MG/DL (ref 8–23)
CALCIUM SERPL-MCNC: 8.8 MG/DL (ref 8.7–10.5)
CALCIUM SERPL-MCNC: 8.9 MG/DL (ref 8.7–10.5)
CHLORIDE SERPL-SCNC: 92 MMOL/L (ref 95–110)
CHLORIDE SERPL-SCNC: 94 MMOL/L (ref 95–110)
CO2 SERPL-SCNC: 40 MMOL/L (ref 23–29)
CO2 SERPL-SCNC: 43 MMOL/L (ref 23–29)
CREAT SERPL-MCNC: 0.9 MG/DL (ref 0.5–1.4)
CREAT SERPL-MCNC: 1 MG/DL (ref 0.5–1.4)
DIFFERENTIAL METHOD: ABNORMAL
EOSINOPHIL # BLD AUTO: 0.2 K/UL (ref 0–0.5)
EOSINOPHIL NFR BLD: 2.1 % (ref 0–8)
ERYTHROCYTE [DISTWIDTH] IN BLOOD BY AUTOMATED COUNT: 15.8 % (ref 11.5–14.5)
EST. GFR  (AFRICAN AMERICAN): >60 ML/MIN/1.73 M^2
EST. GFR  (AFRICAN AMERICAN): >60 ML/MIN/1.73 M^2
EST. GFR  (NON AFRICAN AMERICAN): >60 ML/MIN/1.73 M^2
EST. GFR  (NON AFRICAN AMERICAN): >60 ML/MIN/1.73 M^2
GLUCOSE SERPL-MCNC: 112 MG/DL (ref 70–110)
GLUCOSE SERPL-MCNC: 88 MG/DL (ref 70–110)
HCT VFR BLD AUTO: 40.3 % (ref 40–54)
HGB BLD-MCNC: 11.4 G/DL (ref 14–18)
IMM GRANULOCYTES # BLD AUTO: 0.02 K/UL (ref 0–0.04)
IMM GRANULOCYTES NFR BLD AUTO: 0.3 % (ref 0–0.5)
LYMPHOCYTES # BLD AUTO: 1.7 K/UL (ref 1–4.8)
LYMPHOCYTES NFR BLD: 21.5 % (ref 18–48)
MAGNESIUM SERPL-MCNC: 1.9 MG/DL (ref 1.6–2.6)
MCH RBC QN AUTO: 23.3 PG (ref 27–31)
MCHC RBC AUTO-ENTMCNC: 28.3 G/DL (ref 32–36)
MCV RBC AUTO: 82 FL (ref 82–98)
MONOCYTES # BLD AUTO: 1.1 K/UL (ref 0.3–1)
MONOCYTES NFR BLD: 13.3 % (ref 4–15)
NEUTROPHILS # BLD AUTO: 4.9 K/UL (ref 1.8–7.7)
NEUTROPHILS NFR BLD: 62 % (ref 38–73)
NRBC BLD-RTO: 0 /100 WBC
PHOSPHATE SERPL-MCNC: 3.2 MG/DL (ref 2.7–4.5)
PLATELET # BLD AUTO: 233 K/UL (ref 150–350)
PMV BLD AUTO: 11.5 FL (ref 9.2–12.9)
POTASSIUM SERPL-SCNC: 3.7 MMOL/L (ref 3.5–5.1)
POTASSIUM SERPL-SCNC: 4 MMOL/L (ref 3.5–5.1)
PROT SERPL-MCNC: 7.5 G/DL (ref 6–8.4)
RBC # BLD AUTO: 4.89 M/UL (ref 4.6–6.2)
SODIUM SERPL-SCNC: 144 MMOL/L (ref 136–145)
SODIUM SERPL-SCNC: 146 MMOL/L (ref 136–145)
WBC # BLD AUTO: 7.92 K/UL (ref 3.9–12.7)

## 2020-10-03 PROCEDURE — 80053 COMPREHEN METABOLIC PANEL: CPT | Mod: HCNC

## 2020-10-03 PROCEDURE — 94761 N-INVAS EAR/PLS OXIMETRY MLT: CPT | Mod: HCNC

## 2020-10-03 PROCEDURE — 27000221 HC OXYGEN, UP TO 24 HOURS: Mod: HCNC

## 2020-10-03 PROCEDURE — 99900035 HC TECH TIME PER 15 MIN (STAT): Mod: HCNC

## 2020-10-03 PROCEDURE — 94660 CPAP INITIATION&MGMT: CPT | Mod: HCNC

## 2020-10-03 PROCEDURE — 20600001 HC STEP DOWN PRIVATE ROOM: Mod: HCNC

## 2020-10-03 PROCEDURE — 99233 SBSQ HOSP IP/OBS HIGH 50: CPT | Mod: HCNC,,, | Performed by: HOSPITALIST

## 2020-10-03 PROCEDURE — 80048 BASIC METABOLIC PNL TOTAL CA: CPT | Mod: HCNC

## 2020-10-03 PROCEDURE — 83735 ASSAY OF MAGNESIUM: CPT | Mod: HCNC

## 2020-10-03 PROCEDURE — 25000003 PHARM REV CODE 250: Mod: HCNC | Performed by: STUDENT IN AN ORGANIZED HEALTH CARE EDUCATION/TRAINING PROGRAM

## 2020-10-03 PROCEDURE — 63600175 PHARM REV CODE 636 W HCPCS: Mod: HCNC | Performed by: STUDENT IN AN ORGANIZED HEALTH CARE EDUCATION/TRAINING PROGRAM

## 2020-10-03 PROCEDURE — 99233 PR SUBSEQUENT HOSPITAL CARE,LEVL III: ICD-10-PCS | Mod: HCNC,,, | Performed by: HOSPITALIST

## 2020-10-03 PROCEDURE — 84100 ASSAY OF PHOSPHORUS: CPT | Mod: HCNC

## 2020-10-03 PROCEDURE — 85025 COMPLETE CBC W/AUTO DIFF WBC: CPT | Mod: HCNC

## 2020-10-03 PROCEDURE — 25000003 PHARM REV CODE 250: Mod: HCNC | Performed by: HOSPITALIST

## 2020-10-03 PROCEDURE — 25000003 PHARM REV CODE 250: Mod: HCNC | Performed by: NURSE PRACTITIONER

## 2020-10-03 PROCEDURE — 36415 COLL VENOUS BLD VENIPUNCTURE: CPT | Mod: HCNC

## 2020-10-03 RX ORDER — AMOXICILLIN 250 MG
1 CAPSULE ORAL 2 TIMES DAILY
Status: DISCONTINUED | OUTPATIENT
Start: 2020-10-03 | End: 2020-10-15 | Stop reason: HOSPADM

## 2020-10-03 RX ORDER — FUROSEMIDE 10 MG/ML
80 INJECTION INTRAMUSCULAR; INTRAVENOUS EVERY 8 HOURS
Status: DISCONTINUED | OUTPATIENT
Start: 2020-10-03 | End: 2020-10-03

## 2020-10-03 RX ORDER — FUROSEMIDE 10 MG/ML
80 INJECTION INTRAMUSCULAR; INTRAVENOUS EVERY 6 HOURS
Status: DISCONTINUED | OUTPATIENT
Start: 2020-10-03 | End: 2020-10-03

## 2020-10-03 RX ORDER — POTASSIUM CHLORIDE 20 MEQ/1
60 TABLET, EXTENDED RELEASE ORAL ONCE
Status: COMPLETED | OUTPATIENT
Start: 2020-10-03 | End: 2020-10-03

## 2020-10-03 RX ORDER — POLYETHYLENE GLYCOL 3350 17 G/17G
17 POWDER, FOR SOLUTION ORAL DAILY
Status: DISCONTINUED | OUTPATIENT
Start: 2020-10-03 | End: 2020-10-15 | Stop reason: HOSPADM

## 2020-10-03 RX ORDER — FUROSEMIDE 10 MG/ML
80 INJECTION INTRAMUSCULAR; INTRAVENOUS EVERY 8 HOURS
Status: DISCONTINUED | OUTPATIENT
Start: 2020-10-03 | End: 2020-10-04

## 2020-10-03 RX ORDER — METOPROLOL TARTRATE 25 MG/1
25 TABLET, FILM COATED ORAL 2 TIMES DAILY
Status: COMPLETED | OUTPATIENT
Start: 2020-10-03 | End: 2020-10-03

## 2020-10-03 RX ORDER — MAGNESIUM SULFATE HEPTAHYDRATE 40 MG/ML
2 INJECTION, SOLUTION INTRAVENOUS ONCE
Status: COMPLETED | OUTPATIENT
Start: 2020-10-03 | End: 2020-10-03

## 2020-10-03 RX ORDER — METOPROLOL SUCCINATE 50 MG/1
50 TABLET, EXTENDED RELEASE ORAL DAILY
Status: DISCONTINUED | OUTPATIENT
Start: 2020-10-04 | End: 2020-10-06

## 2020-10-03 RX ADMIN — GABAPENTIN 100 MG: 100 CAPSULE ORAL at 08:10

## 2020-10-03 RX ADMIN — POLYETHYLENE GLYCOL 3350 17 G: 17 POWDER, FOR SOLUTION ORAL at 09:10

## 2020-10-03 RX ADMIN — SACUBITRIL AND VALSARTAN 1 TABLET: 49; 51 TABLET, FILM COATED ORAL at 08:10

## 2020-10-03 RX ADMIN — HEPARIN SODIUM 7500 UNITS: 5000 INJECTION INTRAVENOUS; SUBCUTANEOUS at 09:10

## 2020-10-03 RX ADMIN — METOPROLOL TARTRATE 25 MG: 25 TABLET, FILM COATED ORAL at 09:10

## 2020-10-03 RX ADMIN — FUROSEMIDE 80 MG: 10 INJECTION, SOLUTION INTRAVENOUS at 05:10

## 2020-10-03 RX ADMIN — HEPARIN SODIUM 7500 UNITS: 5000 INJECTION INTRAVENOUS; SUBCUTANEOUS at 03:10

## 2020-10-03 RX ADMIN — PRAVASTATIN SODIUM 80 MG: 40 TABLET ORAL at 08:10

## 2020-10-03 RX ADMIN — METOPROLOL TARTRATE 25 MG: 25 TABLET, FILM COATED ORAL at 08:10

## 2020-10-03 RX ADMIN — CEPHALEXIN 500 MG: 500 CAPSULE ORAL at 03:10

## 2020-10-03 RX ADMIN — GABAPENTIN 100 MG: 100 CAPSULE ORAL at 09:10

## 2020-10-03 RX ADMIN — CEPHALEXIN 500 MG: 500 CAPSULE ORAL at 09:10

## 2020-10-03 RX ADMIN — CEPHALEXIN 500 MG: 500 CAPSULE ORAL at 05:10

## 2020-10-03 RX ADMIN — MELATONIN TAB 3 MG 6 MG: 3 TAB at 09:10

## 2020-10-03 RX ADMIN — GABAPENTIN 100 MG: 100 CAPSULE ORAL at 03:10

## 2020-10-03 RX ADMIN — MAGNESIUM SULFATE 2 G: 2 INJECTION INTRAVENOUS at 09:10

## 2020-10-03 RX ADMIN — DOCUSATE SODIUM 50MG AND SENNOSIDES 8.6MG 1 TABLET: 8.6; 5 TABLET, FILM COATED ORAL at 09:10

## 2020-10-03 RX ADMIN — POTASSIUM CHLORIDE 60 MEQ: 1500 TABLET, EXTENDED RELEASE ORAL at 09:10

## 2020-10-03 RX ADMIN — SACUBITRIL AND VALSARTAN 1 TABLET: 49; 51 TABLET, FILM COATED ORAL at 09:10

## 2020-10-03 RX ADMIN — HEPARIN SODIUM 7500 UNITS: 5000 INJECTION INTRAVENOUS; SUBCUTANEOUS at 05:10

## 2020-10-03 NOTE — ASSESSMENT & PLAN NOTE
Non-O2 dependent with need for BiPAP following bi-V ICD placement. Subsequently weaned to RA with diuresis. 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 CXR with pulmonary edema, pleural effusions, and cardiomegaly. Received 2 doses of 60 mg IV lasix since procedure with good urine output.  DDX: Acute heart failure exacerbation , ACS (less likely), COPD (no history of this but patient does have smoking history), PNA (no fevers, chills)  - improved with lasix gtt    -   - lasix gtt yesterday with 7 L output  - dc drip 10/3, will give bolus this PM  - repeat BMP this PM to check Cr  - lopressor 25 BID, last dose 10/3  - toprol 50 10/4  - entresto 49-51  - hold aldactone, consider restarting tomorrow depending on bp  - HTS consultation per EP recs, appreciate recommendations  - K>4, Mg >2 - replete as needed

## 2020-10-03 NOTE — PLAN OF CARE
Lasix GTT discontinued. BUN/Creatine: 1/14. K 3.7 and replaced with 60mEq. Mag 1.9 replaced with 2g IV. Paced on monitor with HR 60-70s. Patient on 3L NC. 1500 FR encouraged. Intake and output measured. VSS, AOX4, no reports of pain. No occurrences of falls throughout shift. POC reviewed with pt and pt verbalized understanding. Pt in bed at lowest position with wheels locked, 2x upper side rails raised, call light within reach. Will continue to monitor.

## 2020-10-03 NOTE — ASSESSMENT & PLAN NOTE
Home meds: carvedilol 12.5 BID, ramipiril, aldactone  - switched from carvedilol to lopressor 25 BID  - start toprol 50 10/4  - entresto 49-51  - hold aldactone, can consider restarting if blood pressure tolerates increased entresto dose

## 2020-10-03 NOTE — PROGRESS NOTES
[facility] Hospital Medicine  Progress Note    Patient Name: Papito Bhakta  MRN: [id number]  Patient Class: IP- Inpatient   Admission Date: 9/28/2020  Length of Stay: 3 days  Attending Physician: Fara Moyer MD  Primary Care Provider: Brynn To MD    [facility] Medicine Team: [facility] HOSP MED 3 Jennifer Ledezma MD    Subjective:     Principal Problem:Acute hypoxemic respiratory failure        HPI:  Mr. Bhakta is a 66 yo gentleman with PMH of NICM with AICD and recent R CRT-D placement 9/27, hypertension, HFpEF/HFrEF (EF 25% June 2020), hyperlipidemia, and obesity presenting for shortness of breath and hypoxia. On September 27 he underwent placement of cardiac resynchronization therapy pacemaker with Dr. Kwong. After procedure it was difficult to awaken patient. His pH was 7.26 and CO2 was elevated, and he was subsequently placed on BiPAP then weaned down to room air after diuresis. It was recommended he be admitted for volume overload in the setting of hypoxemia. He has been experiencing increased shortness of breath and lower extremity swelling for the past few months since his bi-V ICD was removed in June 2020 secondary to infection. He reports 2 pillow orthopnea, dyspnea ambulating from his room to the kitchen, and a 20# weight gain. Previously he was more active and able to do household chores and yard work. He has been compliant with his home medications and has been alternating between taking 80 mg of lasix once to twice daily though he was prescribed daily on discharge from the hospital in June. He denies chest pain, fevers, chills, weight loss, nausea, vomiting, diarrhea, melena/hematochezia, and dysuria.     ED/Post-Op Course:  On room air. CXR showed cardiomegaly, pulmonary edema, and pleural effusion. Received 60 mg IV lasix x2 with good urine output and improved respiratory status. CMP revealed increased CO2.     Overview/Hospital Course:  Admitted to hospital medicine for  management of acute hypoxemic respiratory failure likely secondary to acute on chronic combined HFpEF/HFrEF s/p Bi-V ICD placement. No further bipap requirements in hospital stay. CXR with cardiomegaly, cephalization, pleural effusions, and congestion. Repeat EF 18%, G3DD, PAP 50s. Diuresed and HTS following. Started on entresto, toprol, and continuing IV lasix. Remains stable on low flow nc.    Interval History: Placed on lasix gtt yesterday PM. UO 7L. Overnight patient used bipap.     Review of Systems   Constitutional: Positive for unexpected weight change (weight gain ). Negative for activity change, chills, diaphoresis, fatigue and fever.   HENT: Negative for facial swelling and trouble swallowing.    Eyes: Negative for visual disturbance.   Respiratory: Negative for apnea, cough, choking, chest tightness, shortness of breath and wheezing.    Cardiovascular: Positive for leg swelling. Negative for chest pain and palpitations.   Gastrointestinal: Positive for abdominal distention. Negative for abdominal pain, blood in stool, constipation, diarrhea, nausea and vomiting.   Endocrine: Negative for cold intolerance, heat intolerance and polyuria.   Genitourinary: Negative for dysuria and hematuria.   Musculoskeletal: Negative for back pain and myalgias.   Skin: Negative for color change, pallor and wound.   Neurological: Negative for dizziness, tremors and weakness.   Hematological: Negative for adenopathy.   Psychiatric/Behavioral: Negative for agitation, behavioral problems and confusion.     Objective:     Vital Signs (Most Recent):  Temp: 97.8 °F (36.6 °C) (10/03/20 1126)  Pulse: 66 (10/03/20 1126)  Resp: (!) 25 (10/03/20 1126)  BP: 109/75 (10/03/20 1126)  SpO2: 98 % (10/03/20 1126) Vital Signs (24h Range):  Temp:  [97.5 °F (36.4 °C)-100.4 °F (38 °C)] 97.8 °F (36.6 °C)  Pulse:  [66-78] 66  Resp:  [18-34] 25  SpO2:  [97 %-99 %] 98 %  BP: ()/(55-77) 109/75     Weight: (!) 159.2 kg (351 lb)  Body mass index  is 41.62 kg/m².    Intake/Output Summary (Last 24 hours) at 10/3/2020 1235  Last data filed at 10/3/2020 1122  Gross per 24 hour   Intake 1553.65 ml   Output 6685 ml   Net -5131.35 ml      Physical Exam  Vitals signs and nursing note reviewed.   Constitutional:       Appearance: Normal appearance. He is obese. He is not ill-appearing, toxic-appearing or diaphoretic.   HENT:      Head: Normocephalic and atraumatic.      Mouth/Throat:      Mouth: Mucous membranes are moist.      Pharynx: No oropharyngeal exudate.   Eyes:      General: No scleral icterus.     Extraocular Movements: Extraocular movements intact.      Conjunctiva/sclera: Conjunctivae normal.      Pupils: Pupils are equal, round, and reactive to light.   Neck:      Musculoskeletal: Normal range of motion and neck supple.   Cardiovascular:      Rate and Rhythm: Normal rate and regular rhythm.      Pulses: Normal pulses.      Heart sounds: No murmur.   Pulmonary:      Comments: On room air  Appears more comfortable today  Talking in complete sentences, no accessory muscle use  Course bilateral breath sounds      Chest:      Chest wall: No tenderness.   Abdominal:      General: Abdomen is flat. Bowel sounds are normal. There is distension.      Tenderness: There is no abdominal tenderness. There is no guarding or rebound.   Musculoskeletal:      Right lower leg: Edema present.      Left lower leg: Edema present.      Comments: Swelling improved today   Lymphadenopathy:      Cervical: No cervical adenopathy.   Skin:     General: Skin is warm and dry.      Capillary Refill: Capillary refill takes less than 2 seconds.   Neurological:      General: No focal deficit present.      Mental Status: He is alert and oriented to person, place, and time. Mental status is at baseline.   Psychiatric:         Mood and Affect: Mood normal.         Significant Labs: All pertinent labs within the past 24 hours have been reviewed.    Significant Imaging: I have reviewed all  pertinent imaging results/findings within the past 24 hours.      Assessment/Plan:      * Acute hypoxemic respiratory failure  Non-O2 dependent with need for BiPAP following bi-V ICD placement. Subsequently weaned to RA with diuresis. 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 CXR with pulmonary edema, pleural effusions, and cardiomegaly. Received 2 doses of 60 mg IV lasix since procedure with good urine output.  DDX: Acute heart failure exacerbation , ACS (less likely), COPD (no history of this but patient does have smoking history), PNA (no fevers, chills)  - improved with lasix gtt    -   - lasix gtt yesterday with 7 L output  - dc drip 10/3, will give bolus this PM  - repeat BMP this PM to check Cr  - lopressor 25 BID, last dose 10/3  - toprol 50 10/4  - entresto 49-51  - hold aldactone, consider restarting tomorrow depending on bp  - HTS consultation per EP recs, appreciate recommendations  - K>4, Mg >2 - replete as needed      Chronic combined systolic and diastolic congestive heart failure  History of combined systolic and diastolic dysfunction, s/p CRT-D 9/28 with continued volume overload. Has AICD. Compliant with home medications.    - Please see Acute Hypoxemic Respiratory Failure      Essential hypertension  Home meds: carvedilol 12.5 BID, ramipiril, aldactone  - switched from carvedilol to lopressor 25 BID  - start toprol 50 10/4  - entresto 49-51  - hold aldactone, can consider restarting if blood pressure tolerates increased entresto dose      Hyperlipidemia  Continue home pravastatin    NICM (nonischemic cardiomyopathy)  Please see Acute Hypoxemic Respiratory Failure      Biventricular ICD (implantable cardioverter-defibrillator) in place  Placed 9/28 with Dr. Kwong.    - Keflex 500 TID for 5 days (end date 10/3)        VTE Risk Mitigation (From admission, onward)         Ordered     heparin (porcine) injection 7,500 Units   Every 8 hours      09/29/20 1417     IP VTE HIGH RISK PATIENT  Once      09/29/20 1417     Place sequential compression device  Until discontinued      09/29/20 1417                Discharge Planning   MARIIA: 10/5/2020     Code Status: Full Code   Is the patient medically ready for discharge?: No    Reason for patient still in hospital (select all that apply): Treatment  Discharge Plan A: Skilled Nursing Facility        Jennifer Ledezma MD  Department of Hospital Medicine   [facility]                      10/03/2020                             STAFF PHYSICIAN NOTE                                   Attending Attestation for Rounds with Resident  I have reviewed and concur with the resident's history, physical, assessment, and plan.  I have personally interviewed and examined the patient at bedside and agree with the resident's findings.      64 yo gentleman with PMH of NICM with AICD and recent R CRT-D placement 9/27, hypertension, HFpEF/HFrEF (EF 18%) hyperlipidemia, and obesity presenting for shortness of breath and hypoxia s/p AICD placement transferred to medicine for HFE.  has been stable on low flow oxygen; getting diuresis and GDMT. Titrating meds and monitoring UO. On lasix gtt.                             Fara Moyer MD  Senior Hospitalist  [id number], [phone]

## 2020-10-03 NOTE — SUBJECTIVE & OBJECTIVE
Interval History: Placed on lasix gtt yesterday PM. UO 7L. Overnight patient used bipap.     Review of Systems   Constitutional: Positive for unexpected weight change (weight gain ). Negative for activity change, chills, diaphoresis, fatigue and fever.   HENT: Negative for facial swelling and trouble swallowing.    Eyes: Negative for visual disturbance.   Respiratory: Negative for apnea, cough, choking, chest tightness, shortness of breath and wheezing.    Cardiovascular: Positive for leg swelling. Negative for chest pain and palpitations.   Gastrointestinal: Positive for abdominal distention. Negative for abdominal pain, blood in stool, constipation, diarrhea, nausea and vomiting.   Endocrine: Negative for cold intolerance, heat intolerance and polyuria.   Genitourinary: Negative for dysuria and hematuria.   Musculoskeletal: Negative for back pain and myalgias.   Skin: Negative for color change, pallor and wound.   Neurological: Negative for dizziness, tremors and weakness.   Hematological: Negative for adenopathy.   Psychiatric/Behavioral: Negative for agitation, behavioral problems and confusion.     Objective:     Vital Signs (Most Recent):  Temp: 97.8 °F (36.6 °C) (10/03/20 1126)  Pulse: 66 (10/03/20 1126)  Resp: (!) 25 (10/03/20 1126)  BP: 109/75 (10/03/20 1126)  SpO2: 98 % (10/03/20 1126) Vital Signs (24h Range):  Temp:  [97.5 °F (36.4 °C)-100.4 °F (38 °C)] 97.8 °F (36.6 °C)  Pulse:  [66-78] 66  Resp:  [18-34] 25  SpO2:  [97 %-99 %] 98 %  BP: ()/(55-77) 109/75     Weight: (!) 159.2 kg (351 lb)  Body mass index is 41.62 kg/m².    Intake/Output Summary (Last 24 hours) at 10/3/2020 1235  Last data filed at 10/3/2020 1122  Gross per 24 hour   Intake 1553.65 ml   Output 6685 ml   Net -5131.35 ml      Physical Exam  Vitals signs and nursing note reviewed.   Constitutional:       Appearance: Normal appearance. He is obese. He is not ill-appearing, toxic-appearing or diaphoretic.   HENT:      Head:  Normocephalic and atraumatic.      Mouth/Throat:      Mouth: Mucous membranes are moist.      Pharynx: No oropharyngeal exudate.   Eyes:      General: No scleral icterus.     Extraocular Movements: Extraocular movements intact.      Conjunctiva/sclera: Conjunctivae normal.      Pupils: Pupils are equal, round, and reactive to light.   Neck:      Musculoskeletal: Normal range of motion and neck supple.   Cardiovascular:      Rate and Rhythm: Normal rate and regular rhythm.      Pulses: Normal pulses.      Heart sounds: No murmur.   Pulmonary:      Comments: On room air  Appears more comfortable today  Talking in complete sentences, no accessory muscle use  Course bilateral breath sounds      Chest:      Chest wall: No tenderness.   Abdominal:      General: Abdomen is flat. Bowel sounds are normal. There is distension.      Tenderness: There is no abdominal tenderness. There is no guarding or rebound.   Musculoskeletal:      Right lower leg: Edema present.      Left lower leg: Edema present.      Comments: Swelling improved today   Lymphadenopathy:      Cervical: No cervical adenopathy.   Skin:     General: Skin is warm and dry.      Capillary Refill: Capillary refill takes less than 2 seconds.   Neurological:      General: No focal deficit present.      Mental Status: He is alert and oriented to person, place, and time. Mental status is at baseline.   Psychiatric:         Mood and Affect: Mood normal.         Significant Labs: All pertinent labs within the past 24 hours have been reviewed.    Significant Imaging: I have reviewed all pertinent imaging results/findings within the past 24 hours.

## 2020-10-03 NOTE — NURSING
Critical lab CO2 41. MD Mei notified. Instructed to encourage pt to be placed on BiPAP overnight. Patient agreed to use BiPAP; respiratory notified. Will continue to monitor.

## 2020-10-03 NOTE — PLAN OF CARE
Pt remains free from fall and injury. Does not complain of any pain or discomfort at this time. Paced rhythm on tele; no S/S of cardiac distress noted. Patient is diuresing and adhering to fluid restrictions. Abx therapy maintained. Lasix gtt maintained; patient diuresing and adhering to fluid restrictions. VSS. Patient on BiPAP. Plan of care reviewed with patient. Call light within reach. Bed in lowest locked position. Will continue to monitor.

## 2020-10-04 LAB
ALBUMIN SERPL BCP-MCNC: 2.5 G/DL (ref 3.5–5.2)
ALP SERPL-CCNC: 83 U/L (ref 55–135)
ALT SERPL W/O P-5'-P-CCNC: 14 U/L (ref 10–44)
ANION GAP SERPL CALC-SCNC: 9 MMOL/L (ref 8–16)
AST SERPL-CCNC: 19 U/L (ref 10–40)
BASOPHILS # BLD AUTO: 0.06 K/UL (ref 0–0.2)
BASOPHILS NFR BLD: 0.9 % (ref 0–1.9)
BILIRUB SERPL-MCNC: 1.1 MG/DL (ref 0.1–1)
BUN SERPL-MCNC: 20 MG/DL (ref 8–23)
CALCIUM SERPL-MCNC: 9.1 MG/DL (ref 8.7–10.5)
CHLORIDE SERPL-SCNC: 93 MMOL/L (ref 95–110)
CO2 SERPL-SCNC: 42 MMOL/L (ref 23–29)
CREAT SERPL-MCNC: 0.9 MG/DL (ref 0.5–1.4)
DIFFERENTIAL METHOD: ABNORMAL
EOSINOPHIL # BLD AUTO: 0.3 K/UL (ref 0–0.5)
EOSINOPHIL NFR BLD: 4.3 % (ref 0–8)
ERYTHROCYTE [DISTWIDTH] IN BLOOD BY AUTOMATED COUNT: 16 % (ref 11.5–14.5)
EST. GFR  (AFRICAN AMERICAN): >60 ML/MIN/1.73 M^2
EST. GFR  (NON AFRICAN AMERICAN): >60 ML/MIN/1.73 M^2
GLUCOSE SERPL-MCNC: 95 MG/DL (ref 70–110)
HCT VFR BLD AUTO: 41.9 % (ref 40–54)
HGB BLD-MCNC: 11.9 G/DL (ref 14–18)
IMM GRANULOCYTES # BLD AUTO: 0.02 K/UL (ref 0–0.04)
IMM GRANULOCYTES NFR BLD AUTO: 0.3 % (ref 0–0.5)
LYMPHOCYTES # BLD AUTO: 1.7 K/UL (ref 1–4.8)
LYMPHOCYTES NFR BLD: 24.6 % (ref 18–48)
MAGNESIUM SERPL-MCNC: 2.1 MG/DL (ref 1.6–2.6)
MCH RBC QN AUTO: 22.9 PG (ref 27–31)
MCHC RBC AUTO-ENTMCNC: 28.4 G/DL (ref 32–36)
MCV RBC AUTO: 81 FL (ref 82–98)
MONOCYTES # BLD AUTO: 0.9 K/UL (ref 0.3–1)
MONOCYTES NFR BLD: 12.5 % (ref 4–15)
NEUTROPHILS # BLD AUTO: 4 K/UL (ref 1.8–7.7)
NEUTROPHILS NFR BLD: 57.4 % (ref 38–73)
NRBC BLD-RTO: 0 /100 WBC
PHOSPHATE SERPL-MCNC: 3.3 MG/DL (ref 2.7–4.5)
PLATELET # BLD AUTO: 229 K/UL (ref 150–350)
PMV BLD AUTO: 11 FL (ref 9.2–12.9)
POTASSIUM SERPL-SCNC: 4.4 MMOL/L (ref 3.5–5.1)
PROT SERPL-MCNC: 7.3 G/DL (ref 6–8.4)
RBC # BLD AUTO: 5.19 M/UL (ref 4.6–6.2)
SODIUM SERPL-SCNC: 144 MMOL/L (ref 136–145)
WBC # BLD AUTO: 6.95 K/UL (ref 3.9–12.7)

## 2020-10-04 PROCEDURE — 80053 COMPREHEN METABOLIC PANEL: CPT | Mod: HCNC

## 2020-10-04 PROCEDURE — 85025 COMPLETE CBC W/AUTO DIFF WBC: CPT | Mod: HCNC

## 2020-10-04 PROCEDURE — 25000003 PHARM REV CODE 250: Mod: HCNC | Performed by: STUDENT IN AN ORGANIZED HEALTH CARE EDUCATION/TRAINING PROGRAM

## 2020-10-04 PROCEDURE — 25000003 PHARM REV CODE 250: Mod: HCNC | Performed by: NURSE PRACTITIONER

## 2020-10-04 PROCEDURE — 99233 PR SUBSEQUENT HOSPITAL CARE,LEVL III: ICD-10-PCS | Mod: HCNC,,, | Performed by: HOSPITALIST

## 2020-10-04 PROCEDURE — 36415 COLL VENOUS BLD VENIPUNCTURE: CPT | Mod: HCNC

## 2020-10-04 PROCEDURE — 84100 ASSAY OF PHOSPHORUS: CPT | Mod: HCNC

## 2020-10-04 PROCEDURE — 94660 CPAP INITIATION&MGMT: CPT | Mod: HCNC

## 2020-10-04 PROCEDURE — 99900035 HC TECH TIME PER 15 MIN (STAT): Mod: HCNC

## 2020-10-04 PROCEDURE — 83735 ASSAY OF MAGNESIUM: CPT | Mod: HCNC

## 2020-10-04 PROCEDURE — 63600175 PHARM REV CODE 636 W HCPCS: Mod: HCNC | Performed by: STUDENT IN AN ORGANIZED HEALTH CARE EDUCATION/TRAINING PROGRAM

## 2020-10-04 PROCEDURE — 27000221 HC OXYGEN, UP TO 24 HOURS: Mod: HCNC

## 2020-10-04 PROCEDURE — 20600001 HC STEP DOWN PRIVATE ROOM: Mod: HCNC

## 2020-10-04 PROCEDURE — 94761 N-INVAS EAR/PLS OXIMETRY MLT: CPT | Mod: HCNC

## 2020-10-04 PROCEDURE — 99233 SBSQ HOSP IP/OBS HIGH 50: CPT | Mod: HCNC,,, | Performed by: HOSPITALIST

## 2020-10-04 RX ORDER — FUROSEMIDE 10 MG/ML
80 INJECTION INTRAMUSCULAR; INTRAVENOUS EVERY 8 HOURS
Status: COMPLETED | OUTPATIENT
Start: 2020-10-04 | End: 2020-10-05

## 2020-10-04 RX ORDER — FUROSEMIDE 10 MG/ML
80 INJECTION INTRAMUSCULAR; INTRAVENOUS ONCE
Status: DISCONTINUED | OUTPATIENT
Start: 2020-10-04 | End: 2020-10-04

## 2020-10-04 RX ADMIN — ACETAMINOPHEN 650 MG: 325 TABLET ORAL at 05:10

## 2020-10-04 RX ADMIN — DOCUSATE SODIUM 50MG AND SENNOSIDES 8.6MG 1 TABLET: 8.6; 5 TABLET, FILM COATED ORAL at 09:10

## 2020-10-04 RX ADMIN — GABAPENTIN 100 MG: 100 CAPSULE ORAL at 08:10

## 2020-10-04 RX ADMIN — HEPARIN SODIUM 7500 UNITS: 5000 INJECTION INTRAVENOUS; SUBCUTANEOUS at 09:10

## 2020-10-04 RX ADMIN — SACUBITRIL AND VALSARTAN 1 TABLET: 49; 51 TABLET, FILM COATED ORAL at 08:10

## 2020-10-04 RX ADMIN — HEPARIN SODIUM 7500 UNITS: 5000 INJECTION INTRAVENOUS; SUBCUTANEOUS at 02:10

## 2020-10-04 RX ADMIN — METOPROLOL SUCCINATE 50 MG: 50 TABLET, EXTENDED RELEASE ORAL at 08:10

## 2020-10-04 RX ADMIN — FUROSEMIDE 80 MG: 10 INJECTION, SOLUTION INTRAMUSCULAR; INTRAVENOUS at 08:10

## 2020-10-04 RX ADMIN — SACUBITRIL AND VALSARTAN 1 TABLET: 49; 51 TABLET, FILM COATED ORAL at 09:10

## 2020-10-04 RX ADMIN — DOCUSATE SODIUM 50MG AND SENNOSIDES 8.6MG 1 TABLET: 8.6; 5 TABLET, FILM COATED ORAL at 08:10

## 2020-10-04 RX ADMIN — GABAPENTIN 100 MG: 100 CAPSULE ORAL at 09:10

## 2020-10-04 RX ADMIN — FUROSEMIDE 80 MG: 10 INJECTION, SOLUTION INTRAMUSCULAR; INTRAVENOUS at 09:10

## 2020-10-04 RX ADMIN — PRAVASTATIN SODIUM 80 MG: 40 TABLET ORAL at 08:10

## 2020-10-04 RX ADMIN — HEPARIN SODIUM 7500 UNITS: 5000 INJECTION INTRAVENOUS; SUBCUTANEOUS at 05:10

## 2020-10-04 RX ADMIN — FUROSEMIDE 80 MG: 10 INJECTION, SOLUTION INTRAMUSCULAR; INTRAVENOUS at 02:10

## 2020-10-04 RX ADMIN — FUROSEMIDE 80 MG: 10 INJECTION, SOLUTION INTRAVENOUS at 01:10

## 2020-10-04 RX ADMIN — POLYETHYLENE GLYCOL 3350 17 G: 17 POWDER, FOR SOLUTION ORAL at 08:10

## 2020-10-04 RX ADMIN — GABAPENTIN 100 MG: 100 CAPSULE ORAL at 02:10

## 2020-10-04 NOTE — PLAN OF CARE
Discussed plan of care with Pt., NAD, Denied CP or SOB, VSS for Pt., 1.5 L NC Sats >93%, Will continue to monitor.

## 2020-10-04 NOTE — ASSESSMENT & PLAN NOTE
Home meds: carvedilol 12.5 BID, ramipiril, aldactone  - switched from carvedilol to lopressor 25 BID  - start toprol 50 10/4  - entresto 49-51  - hold aldactone, can consider restarting if blood pressure tolerates increased entresto dose and once pt is on PO diuresis

## 2020-10-04 NOTE — ASSESSMENT & PLAN NOTE
Non-O2 dependent with need for BiPAP following bi-V ICD placement. Subsequently weaned to RA with diuresis. 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 CXR with pulmonary edema, pleural effusions, and cardiomegaly. Received 2 doses of 60 mg IV lasix since procedure with good urine output.  DDX: Acute heart failure exacerbation , ACS (less likely), COPD (no history of this but patient does have smoking history), PNA (no fevers, chills)  - improved with lasix gtt    -   - lasix gtt 10/2, dc 10/3  - furosemide 80 TID  - repeat BMP this PM to check Cr  - lopressor 25 BID, last dose 10/3  - toprol 50  - entresto 49-51  - hold aldactone, consider restarting once on PO lasix  - HTS consultation per EP recs, appreciate recommendations  - K>4, Mg >2 - replete as needed

## 2020-10-04 NOTE — PROGRESS NOTES
[facility] Hospital Medicine  Progress Note    Patient Name: Papito Bhakta  MRN: [id number]  Patient Class: IP- Inpatient   Admission Date: 9/28/2020  Length of Stay: 4 days  Attending Physician: Fara Moyer MD  Primary Care Provider: Brynn To MD    [facility] Medicine Team: [facility] 3 Jennifer Ledezma MD    Subjective:     Principal Problem:Acute hypoxemic respiratory failure        HPI:  Mr. Bhakta is a 64 yo gentleman with PMH of NICM with AICD and recent R CRT-D placement 9/27, hypertension, HFpEF/HFrEF (EF 25% June 2020), hyperlipidemia, and obesity presenting for shortness of breath and hypoxia. On September 27 he underwent placement of cardiac resynchronization therapy pacemaker with Dr. Kwong. After procedure it was difficult to awaken patient. His pH was 7.26 and CO2 was elevated, and he was subsequently placed on BiPAP then weaned down to room air after diuresis. It was recommended he be admitted for volume overload in the setting of hypoxemia. He has been experiencing increased shortness of breath and lower extremity swelling for the past few months since his bi-V ICD was removed in June 2020 secondary to infection. He reports 2 pillow orthopnea, dyspnea ambulating from his room to the kitchen, and a 20# weight gain. Previously he was more active and able to do household chores and yard work. He has been compliant with his home medications and has been alternating between taking 80 mg of lasix once to twice daily though he was prescribed daily on discharge from the hospital in June. He denies chest pain, fevers, chills, weight loss, nausea, vomiting, diarrhea, melena/hematochezia, and dysuria.     ED/Post-Op Course:  On room air. CXR showed cardiomegaly, pulmonary edema, and pleural effusion. Received 60 mg IV lasix x2 with good urine output and improved respiratory status. CMP revealed increased CO2.     Overview/Hospital Course:  Admitted to hospital medicine for  management of acute hypoxemic respiratory failure likely secondary to acute on chronic combined HFpEF/HFrEF s/p Bi-V ICD placement. No further bipap requirements in hospital stay. CXR with cardiomegaly, cephalization, pleural effusions, and congestion. Repeat EF 18%, G3DD, PAP 50s. Diuresed and HTS following. Started on entresto, toprol, and continuing IV lasix. Remains stable on low flow nc.    Interval History: No events overnight. Patient weaned to 1.5 L oxygen. UO 2.9 L, net 1.1 negative (total negative 10L for stay). No BM in a few days. Feels subjectively improved, was able to sit up in the bed yesterday.    Review of Systems   Constitutional: Positive for unexpected weight change (weight gain ). Negative for activity change, chills, diaphoresis, fatigue and fever.   HENT: Negative for facial swelling and trouble swallowing.    Eyes: Negative for visual disturbance.   Respiratory: Negative for apnea, cough, choking, chest tightness, shortness of breath and wheezing.    Cardiovascular: Positive for leg swelling. Negative for chest pain and palpitations.   Gastrointestinal: Positive for abdominal distention. Negative for abdominal pain, blood in stool, constipation, diarrhea, nausea and vomiting.   Endocrine: Negative for cold intolerance, heat intolerance and polyuria.   Genitourinary: Negative for dysuria and hematuria.   Musculoskeletal: Negative for back pain and myalgias.   Skin: Negative for color change, pallor and wound.   Neurological: Negative for dizziness, tremors and weakness.   Hematological: Negative for adenopathy.   Psychiatric/Behavioral: Negative for agitation, behavioral problems and confusion.     Objective:     Vital Signs (Most Recent):  Temp: 97.4 °F (36.3 °C) (10/04/20 0518)  Pulse: 76 (10/04/20 0530)  Resp: (!) 32 (10/04/20 0530)  BP: 98/66 (10/04/20 0518)  SpO2: (!) 91 % (10/04/20 0530) Vital Signs (24h Range):  Temp:  [97.4 °F (36.3 °C)-98.1 °F (36.7 °C)] 97.4 °F (36.3 °C)  Pulse:   [65-86] 76  Resp:  [21-32] 32  SpO2:  [91 %-99 %] 91 %  BP: ()/(54-75) 98/66     Weight: (!) 159.2 kg (351 lb)  Body mass index is 41.62 kg/m².    Intake/Output Summary (Last 24 hours) at 10/4/2020 0810  Last data filed at 10/4/2020 0700  Gross per 24 hour   Intake 1824 ml   Output 4025 ml   Net -2201 ml      Physical Exam  Vitals signs and nursing note reviewed.   Constitutional:       Appearance: Normal appearance. He is obese. He is not ill-appearing, toxic-appearing or diaphoretic.   HENT:      Head: Normocephalic and atraumatic.      Mouth/Throat:      Mouth: Mucous membranes are moist.      Pharynx: No oropharyngeal exudate.   Eyes:      General: No scleral icterus.     Extraocular Movements: Extraocular movements intact.      Conjunctiva/sclera: Conjunctivae normal.      Pupils: Pupils are equal, round, and reactive to light.   Neck:      Musculoskeletal: Normal range of motion and neck supple.   Cardiovascular:      Rate and Rhythm: Normal rate and regular rhythm.      Pulses: Normal pulses.      Heart sounds: No murmur.   Pulmonary:      Comments: On 1.5 L  Appears more comfortable today  Talking in complete sentences, no accessory muscle use  Course bilateral breath sounds      Chest:      Chest wall: No tenderness.   Abdominal:      General: Abdomen is flat. Bowel sounds are normal. There is distension.      Tenderness: There is no abdominal tenderness. There is no guarding or rebound.   Musculoskeletal:      Right lower leg: Edema present.      Left lower leg: Edema present.      Comments: Swelling improved today   Lymphadenopathy:      Cervical: No cervical adenopathy.   Skin:     General: Skin is warm and dry.      Capillary Refill: Capillary refill takes less than 2 seconds.   Neurological:      General: No focal deficit present.      Mental Status: He is alert and oriented to person, place, and time. Mental status is at baseline.   Psychiatric:         Mood and Affect: Mood normal.          Significant Labs: All pertinent labs within the past 24 hours have been reviewed.    Significant Imaging: I have reviewed all pertinent imaging results/findings within the past 24 hours.      Assessment/Plan:      * Acute hypoxemic respiratory failure  Non-O2 dependent with need for BiPAP following bi-V ICD placement. Subsequently weaned to RA with diuresis. 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 CXR with pulmonary edema, pleural effusions, and cardiomegaly. Received 2 doses of 60 mg IV lasix since procedure with good urine output.  DDX: Acute heart failure exacerbation , ACS (less likely), COPD (no history of this but patient does have smoking history), PNA (no fevers, chills)  - improved with lasix gtt    -   - lasix gtt 10/2, dc 10/3  - furosemide 80 TID  - repeat BMP this PM to check Cr  - lopressor 25 BID, last dose 10/3  - toprol 50  - entresto 49-51  - hold aldactone, consider restarting once on PO lasix  - HTS consultation per EP recs, appreciate recommendations  - K>4, Mg >2 - replete as needed      Chronic combined systolic and diastolic congestive heart failure  History of combined systolic and diastolic dysfunction, s/p CRT-D 9/28 with continued volume overload. Has AICD. Compliant with home medications.    - Please see Acute Hypoxemic Respiratory Failure      Essential hypertension  Home meds: carvedilol 12.5 BID, ramipiril, aldactone  - switched from carvedilol to lopressor 25 BID  - start toprol 50 10/4  - entresto 49-51  - hold aldactone, can consider restarting if blood pressure tolerates increased entresto dose and once pt is on PO diuresis      Hyperlipidemia  Continue home pravastatin    NICM (nonischemic cardiomyopathy)  Please see Acute Hypoxemic Respiratory Failure      Biventricular ICD (implantable cardioverter-defibrillator) in place  Placed 9/28 with Dr. Kwong.    - completed prophylactic keflex  dose        VTE Risk Mitigation (From admission, onward)         Ordered     heparin (porcine) injection 7,500 Units  Every 8 hours      09/29/20 1417     IP VTE HIGH RISK PATIENT  Once      09/29/20 1417     Place sequential compression device  Until discontinued      09/29/20 1417                Discharge Planning   MARIIA: 10/5/2020     Code Status: Full Code   Is the patient medically ready for discharge?: No    Reason for patient still in hospital (select all that apply): Treatment  Discharge Plan A: Skilled Nursing Facility                  Jennifer Ledezma MD  Department of Hospital Medicine   [facility]                      10/04/2020                             STAFF PHYSICIAN NOTE                                   Attending Attestation for Rounds with Resident  I have reviewed and concur with the resident's history, physical, assessment, and plan.  I have personally interviewed and examined the patient at bedside and agree with the resident's findings.           66 yo gentleman with PMH of NICM with AICD and recent R CRT-D placement 9/27, hypertension, HFpEF/HFrEF (EF 18%) hyperlipidemia, and obesity presenting for shortness of breath and hypoxia s/p AICD placement transferred to medicine for HFE.  has been stable on low flow oxygen; getting diuresis and GDMT. Titrating meds and monitoring UO.                       Fara Moyer MD  Senior Hospitalist  [id number], [phone]

## 2020-10-04 NOTE — SUBJECTIVE & OBJECTIVE
Interval History: No events overnight. Patient weaned to 1.5 L oxygen. UO 2.9 L, net 1.1 negative (total negative 10L for stay). No BM in a few days.    Review of Systems   Constitutional: Positive for unexpected weight change (weight gain ). Negative for activity change, chills, diaphoresis, fatigue and fever.   HENT: Negative for facial swelling and trouble swallowing.    Eyes: Negative for visual disturbance.   Respiratory: Negative for apnea, cough, choking, chest tightness, shortness of breath and wheezing.    Cardiovascular: Positive for leg swelling. Negative for chest pain and palpitations.   Gastrointestinal: Positive for abdominal distention. Negative for abdominal pain, blood in stool, constipation, diarrhea, nausea and vomiting.   Endocrine: Negative for cold intolerance, heat intolerance and polyuria.   Genitourinary: Negative for dysuria and hematuria.   Musculoskeletal: Negative for back pain and myalgias.   Skin: Negative for color change, pallor and wound.   Neurological: Negative for dizziness, tremors and weakness.   Hematological: Negative for adenopathy.   Psychiatric/Behavioral: Negative for agitation, behavioral problems and confusion.     Objective:     Vital Signs (Most Recent):  Temp: 97.4 °F (36.3 °C) (10/04/20 0518)  Pulse: 76 (10/04/20 0530)  Resp: (!) 32 (10/04/20 0530)  BP: 98/66 (10/04/20 0518)  SpO2: (!) 91 % (10/04/20 0530) Vital Signs (24h Range):  Temp:  [97.4 °F (36.3 °C)-98.1 °F (36.7 °C)] 97.4 °F (36.3 °C)  Pulse:  [65-86] 76  Resp:  [21-32] 32  SpO2:  [91 %-99 %] 91 %  BP: ()/(54-75) 98/66     Weight: (!) 159.2 kg (351 lb)  Body mass index is 41.62 kg/m².    Intake/Output Summary (Last 24 hours) at 10/4/2020 0810  Last data filed at 10/4/2020 0700  Gross per 24 hour   Intake 1824 ml   Output 4025 ml   Net -2201 ml      Physical Exam  Vitals signs and nursing note reviewed.   Constitutional:       Appearance: Normal appearance. He is obese. He is not ill-appearing,  toxic-appearing or diaphoretic.   HENT:      Head: Normocephalic and atraumatic.      Mouth/Throat:      Mouth: Mucous membranes are moist.      Pharynx: No oropharyngeal exudate.   Eyes:      General: No scleral icterus.     Extraocular Movements: Extraocular movements intact.      Conjunctiva/sclera: Conjunctivae normal.      Pupils: Pupils are equal, round, and reactive to light.   Neck:      Musculoskeletal: Normal range of motion and neck supple.   Cardiovascular:      Rate and Rhythm: Normal rate and regular rhythm.      Pulses: Normal pulses.      Heart sounds: No murmur.   Pulmonary:      Comments: On 1.5 L  Appears more comfortable today  Talking in complete sentences, no accessory muscle use  Course bilateral breath sounds      Chest:      Chest wall: No tenderness.   Abdominal:      General: Abdomen is flat. Bowel sounds are normal. There is distension.      Tenderness: There is no abdominal tenderness. There is no guarding or rebound.   Musculoskeletal:      Right lower leg: Edema present.      Left lower leg: Edema present.      Comments: Swelling improved today   Lymphadenopathy:      Cervical: No cervical adenopathy.   Skin:     General: Skin is warm and dry.      Capillary Refill: Capillary refill takes less than 2 seconds.   Neurological:      General: No focal deficit present.      Mental Status: He is alert and oriented to person, place, and time. Mental status is at baseline.   Psychiatric:         Mood and Affect: Mood normal.         Significant Labs: All pertinent labs within the past 24 hours have been reviewed.    Significant Imaging: I have reviewed all pertinent imaging results/findings within the past 24 hours.

## 2020-10-04 NOTE — CARE UPDATE
Rapid Response Nurse Chart Check     Chart check completed, abnormal VS noted. Please call [phone] for further concerns or assistance.

## 2020-10-05 ENCOUNTER — DOCUMENTATION ONLY (OUTPATIENT)
Dept: CARDIOLOGY | Facility: HOSPITAL | Age: 65
End: 2020-10-05

## 2020-10-05 ENCOUNTER — TELEPHONE (OUTPATIENT)
Dept: PHARMACY | Facility: CLINIC | Age: 65
End: 2020-10-05

## 2020-10-05 LAB
ALBUMIN SERPL BCP-MCNC: 2.6 G/DL (ref 3.5–5.2)
ALP SERPL-CCNC: 86 U/L (ref 55–135)
ALT SERPL W/O P-5'-P-CCNC: 15 U/L (ref 10–44)
ANION GAP SERPL CALC-SCNC: 11 MMOL/L (ref 8–16)
ANION GAP SERPL CALC-SCNC: 12 MMOL/L (ref 8–16)
AST SERPL-CCNC: 20 U/L (ref 10–40)
BASOPHILS # BLD AUTO: 0.05 K/UL (ref 0–0.2)
BASOPHILS NFR BLD: 0.7 % (ref 0–1.9)
BILIRUB SERPL-MCNC: 1.1 MG/DL (ref 0.1–1)
BUN SERPL-MCNC: 27 MG/DL (ref 8–23)
BUN SERPL-MCNC: 27 MG/DL (ref 8–23)
CALCIUM SERPL-MCNC: 9.3 MG/DL (ref 8.7–10.5)
CALCIUM SERPL-MCNC: 9.6 MG/DL (ref 8.7–10.5)
CHLORIDE SERPL-SCNC: 93 MMOL/L (ref 95–110)
CHLORIDE SERPL-SCNC: 94 MMOL/L (ref 95–110)
CO2 SERPL-SCNC: 35 MMOL/L (ref 23–29)
CO2 SERPL-SCNC: 36 MMOL/L (ref 23–29)
CREAT SERPL-MCNC: 1 MG/DL (ref 0.5–1.4)
CREAT SERPL-MCNC: 1.1 MG/DL (ref 0.5–1.4)
DIFFERENTIAL METHOD: ABNORMAL
EOSINOPHIL # BLD AUTO: 0.3 K/UL (ref 0–0.5)
EOSINOPHIL NFR BLD: 3.4 % (ref 0–8)
ERYTHROCYTE [DISTWIDTH] IN BLOOD BY AUTOMATED COUNT: 16 % (ref 11.5–14.5)
EST. GFR  (AFRICAN AMERICAN): >60 ML/MIN/1.73 M^2
EST. GFR  (AFRICAN AMERICAN): >60 ML/MIN/1.73 M^2
EST. GFR  (NON AFRICAN AMERICAN): >60 ML/MIN/1.73 M^2
EST. GFR  (NON AFRICAN AMERICAN): >60 ML/MIN/1.73 M^2
GLUCOSE SERPL-MCNC: 90 MG/DL (ref 70–110)
GLUCOSE SERPL-MCNC: 93 MG/DL (ref 70–110)
HCT VFR BLD AUTO: 41.8 % (ref 40–54)
HGB BLD-MCNC: 12.1 G/DL (ref 14–18)
IMM GRANULOCYTES # BLD AUTO: 0.02 K/UL (ref 0–0.04)
IMM GRANULOCYTES NFR BLD AUTO: 0.3 % (ref 0–0.5)
LYMPHOCYTES # BLD AUTO: 1.7 K/UL (ref 1–4.8)
LYMPHOCYTES NFR BLD: 23.5 % (ref 18–48)
MAGNESIUM SERPL-MCNC: 2.1 MG/DL (ref 1.6–2.6)
MCH RBC QN AUTO: 22.9 PG (ref 27–31)
MCHC RBC AUTO-ENTMCNC: 28.9 G/DL (ref 32–36)
MCV RBC AUTO: 79 FL (ref 82–98)
MONOCYTES # BLD AUTO: 0.9 K/UL (ref 0.3–1)
MONOCYTES NFR BLD: 12.5 % (ref 4–15)
NEUTROPHILS # BLD AUTO: 4.4 K/UL (ref 1.8–7.7)
NEUTROPHILS NFR BLD: 59.6 % (ref 38–73)
NRBC BLD-RTO: 0 /100 WBC
PHOSPHATE SERPL-MCNC: 3.9 MG/DL (ref 2.7–4.5)
PLATELET # BLD AUTO: 233 K/UL (ref 150–350)
PMV BLD AUTO: 11.9 FL (ref 9.2–12.9)
POTASSIUM SERPL-SCNC: 4.1 MMOL/L (ref 3.5–5.1)
POTASSIUM SERPL-SCNC: 4.4 MMOL/L (ref 3.5–5.1)
PROT SERPL-MCNC: 7.7 G/DL (ref 6–8.4)
RBC # BLD AUTO: 5.29 M/UL (ref 4.6–6.2)
SODIUM SERPL-SCNC: 140 MMOL/L (ref 136–145)
SODIUM SERPL-SCNC: 141 MMOL/L (ref 136–145)
WBC # BLD AUTO: 7.37 K/UL (ref 3.9–12.7)

## 2020-10-05 PROCEDURE — 99900035 HC TECH TIME PER 15 MIN (STAT): Mod: HCNC

## 2020-10-05 PROCEDURE — 36415 COLL VENOUS BLD VENIPUNCTURE: CPT | Mod: HCNC

## 2020-10-05 PROCEDURE — 97112 NEUROMUSCULAR REEDUCATION: CPT | Mod: HCNC

## 2020-10-05 PROCEDURE — 25000003 PHARM REV CODE 250: Mod: HCNC | Performed by: STUDENT IN AN ORGANIZED HEALTH CARE EDUCATION/TRAINING PROGRAM

## 2020-10-05 PROCEDURE — 94660 CPAP INITIATION&MGMT: CPT | Mod: HCNC

## 2020-10-05 PROCEDURE — 63600175 PHARM REV CODE 636 W HCPCS: Mod: HCNC | Performed by: STUDENT IN AN ORGANIZED HEALTH CARE EDUCATION/TRAINING PROGRAM

## 2020-10-05 PROCEDURE — 97116 GAIT TRAINING THERAPY: CPT | Mod: HCNC

## 2020-10-05 PROCEDURE — 85025 COMPLETE CBC W/AUTO DIFF WBC: CPT | Mod: HCNC

## 2020-10-05 PROCEDURE — 84100 ASSAY OF PHOSPHORUS: CPT | Mod: HCNC

## 2020-10-05 PROCEDURE — 25000003 PHARM REV CODE 250: Mod: HCNC | Performed by: NURSE PRACTITIONER

## 2020-10-05 PROCEDURE — 80048 BASIC METABOLIC PNL TOTAL CA: CPT | Mod: HCNC

## 2020-10-05 PROCEDURE — 97530 THERAPEUTIC ACTIVITIES: CPT | Mod: HCNC

## 2020-10-05 PROCEDURE — 83735 ASSAY OF MAGNESIUM: CPT | Mod: HCNC

## 2020-10-05 PROCEDURE — 97535 SELF CARE MNGMENT TRAINING: CPT | Mod: HCNC

## 2020-10-05 PROCEDURE — 99233 PR SUBSEQUENT HOSPITAL CARE,LEVL III: ICD-10-PCS | Mod: HCNC,,, | Performed by: INTERNAL MEDICINE

## 2020-10-05 PROCEDURE — 97110 THERAPEUTIC EXERCISES: CPT | Mod: HCNC

## 2020-10-05 PROCEDURE — 80053 COMPREHEN METABOLIC PANEL: CPT | Mod: HCNC

## 2020-10-05 PROCEDURE — 99233 SBSQ HOSP IP/OBS HIGH 50: CPT | Mod: HCNC,,, | Performed by: INTERNAL MEDICINE

## 2020-10-05 PROCEDURE — 99232 SBSQ HOSP IP/OBS MODERATE 35: CPT | Mod: HCNC,GC,, | Performed by: INTERNAL MEDICINE

## 2020-10-05 PROCEDURE — 20600001 HC STEP DOWN PRIVATE ROOM: Mod: HCNC

## 2020-10-05 PROCEDURE — 94761 N-INVAS EAR/PLS OXIMETRY MLT: CPT | Mod: HCNC

## 2020-10-05 PROCEDURE — 99232 PR SUBSEQUENT HOSPITAL CARE,LEVL II: ICD-10-PCS | Mod: HCNC,GC,, | Performed by: INTERNAL MEDICINE

## 2020-10-05 RX ORDER — FUROSEMIDE 80 MG/1
80 TABLET ORAL 2 TIMES DAILY
Status: DISCONTINUED | OUTPATIENT
Start: 2020-10-06 | End: 2020-10-06

## 2020-10-05 RX ORDER — SPIRONOLACTONE 25 MG/1
25 TABLET ORAL DAILY
Status: DISCONTINUED | OUTPATIENT
Start: 2020-10-06 | End: 2020-10-06

## 2020-10-05 RX ORDER — BISACODYL 10 MG
10 SUPPOSITORY, RECTAL RECTAL DAILY
Status: DISCONTINUED | OUTPATIENT
Start: 2020-10-05 | End: 2020-10-06

## 2020-10-05 RX ADMIN — DOCUSATE SODIUM 50MG AND SENNOSIDES 8.6MG 1 TABLET: 8.6; 5 TABLET, FILM COATED ORAL at 09:10

## 2020-10-05 RX ADMIN — HEPARIN SODIUM 7500 UNITS: 5000 INJECTION INTRAVENOUS; SUBCUTANEOUS at 02:10

## 2020-10-05 RX ADMIN — POLYETHYLENE GLYCOL 3350 17 G: 17 POWDER, FOR SOLUTION ORAL at 09:10

## 2020-10-05 RX ADMIN — HEPARIN SODIUM 7500 UNITS: 5000 INJECTION INTRAVENOUS; SUBCUTANEOUS at 05:10

## 2020-10-05 RX ADMIN — SACUBITRIL AND VALSARTAN 1 TABLET: 49; 51 TABLET, FILM COATED ORAL at 09:10

## 2020-10-05 RX ADMIN — HEPARIN SODIUM 7500 UNITS: 5000 INJECTION INTRAVENOUS; SUBCUTANEOUS at 10:10

## 2020-10-05 RX ADMIN — GABAPENTIN 100 MG: 100 CAPSULE ORAL at 03:10

## 2020-10-05 RX ADMIN — METOPROLOL SUCCINATE 50 MG: 50 TABLET, EXTENDED RELEASE ORAL at 09:10

## 2020-10-05 RX ADMIN — FUROSEMIDE 80 MG: 10 INJECTION, SOLUTION INTRAMUSCULAR; INTRAVENOUS at 10:10

## 2020-10-05 RX ADMIN — FUROSEMIDE 80 MG: 10 INJECTION, SOLUTION INTRAMUSCULAR; INTRAVENOUS at 02:10

## 2020-10-05 RX ADMIN — BISACODYL 10 MG: 10 SUPPOSITORY RECTAL at 03:10

## 2020-10-05 RX ADMIN — GABAPENTIN 100 MG: 100 CAPSULE ORAL at 09:10

## 2020-10-05 RX ADMIN — GABAPENTIN 100 MG: 100 CAPSULE ORAL at 08:10

## 2020-10-05 RX ADMIN — PRAVASTATIN SODIUM 80 MG: 40 TABLET ORAL at 09:10

## 2020-10-05 RX ADMIN — SACUBITRIL AND VALSARTAN 1 TABLET: 49; 51 TABLET, FILM COATED ORAL at 08:10

## 2020-10-05 RX ADMIN — DOCUSATE SODIUM 50MG AND SENNOSIDES 8.6MG 1 TABLET: 8.6; 5 TABLET, FILM COATED ORAL at 08:10

## 2020-10-05 RX ADMIN — FUROSEMIDE 80 MG: 10 INJECTION, SOLUTION INTRAMUSCULAR; INTRAVENOUS at 05:10

## 2020-10-05 NOTE — PLAN OF CARE
Plan of care reviewed with pt, verbalized understanding  Answered questions  Free from falls and injury  Afebrile  SR/ST on tele  PT OT seen  Will continue to monitor

## 2020-10-05 NOTE — PLAN OF CARE
Discharge Recommendation: SNF.    0 goals met today. PT goals appropriate.    Patient is safe to perform sitting EOB with SBA with nursing staff.    Ana Palacios, PT, DPT  10/5/2020  Pager: [phone]        Problem: Physical Therapy Goal  Goal: Physical Therapy Goal  Description: Goals to be met by: 10/13/20     Patient will increase functional independence with mobility by performin. Supine to sit with Moderate Assistance.  2. Sit to supine with Moderate Assistance.  3. Sit to stand transfer with Moderate Assistance, with ankit-walker PRN, maintaining Pacemaker precautions.  4. Bed to chair transfer with Moderate Assistance, with ankit-walker PRN, maintaining Pacemaker precautions.  5. Gait  x 50 feet with Moderate Assistance, with ankit-walker PRN, maintaining Pacemaker precautions.   6. Ascend/Descend 6 inch curb step with Moderate Assistance, with ankit-walker PRN, maintaining Pacemaker precautions.  7. Lower extremity exercise program x 20 reps per handout, with assistance as needed.    Outcome: Ongoing, Progressing

## 2020-10-05 NOTE — ASSESSMENT & PLAN NOTE
Non-O2 dependent with need for BiPAP following bi-V ICD placement. Subsequently weaned to RA with diuresis. 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 CXR with pulmonary edema, pleural effusions, and cardiomegaly. Received 2 doses of 60 mg IV lasix s/p procedure.  DDX: Acute heart failure exacerbation , ACS (less likely), COPD (no history of this but patient does have smoking history), PNA (no fevers, chills)  - improving - appears more euvolemic on exam  - unable to obtain daily weights  - HTS consultation per EP recs, have signed off    Plan:  -   - lasix gtt 10/2, dc 10/3  - furosemide 80 TID - plan to transition to PO tomorrow  - repeat BMP this PM to check Cr  - lopressor 25 BID, last dose 10/3  - toprol 50  - entresto 49-51  - hold aldactone, consider restarting once on PO lasix  - K>4, Mg >2 - replete as needed

## 2020-10-05 NOTE — SUBJECTIVE & OBJECTIVE
Interval History: No events overnight. Vital signs stable, oxygen sats >95% room air. No bowel movement in multiple days. Urine output 4.9 L overnight, net negative 3L.     Review of Systems   Constitutional: Positive for unexpected weight change (weight gain ). Negative for activity change, chills, diaphoresis, fatigue and fever.   HENT: Negative for facial swelling and trouble swallowing.    Eyes: Negative for visual disturbance.   Respiratory: Negative for apnea, cough, choking, chest tightness, shortness of breath and wheezing.    Cardiovascular: Positive for leg swelling. Negative for chest pain and palpitations.   Gastrointestinal: Positive for abdominal distention. Negative for abdominal pain, blood in stool, constipation, diarrhea, nausea and vomiting.   Endocrine: Negative for cold intolerance, heat intolerance and polyuria.   Genitourinary: Negative for dysuria and hematuria.   Musculoskeletal: Negative for back pain and myalgias.   Skin: Negative for color change, pallor and wound.   Neurological: Negative for dizziness, tremors and weakness.   Hematological: Negative for adenopathy.   Psychiatric/Behavioral: Negative for agitation, behavioral problems and confusion.     Objective:     Vital Signs (Most Recent):  Temp: 98.2 °F (36.8 °C) (10/05/20 1121)  Pulse: 85 (10/05/20 1121)  Resp: 18 (10/05/20 1121)  BP: (!) 90/59 (10/05/20 1121)  SpO2: (!) 91 % (10/05/20 1121) Vital Signs (24h Range):  Temp:  [97.7 °F (36.5 °C)-98.6 °F (37 °C)] 98.2 °F (36.8 °C)  Pulse:  [75-90] 85  Resp:  [18] 18  SpO2:  [91 %-99 %] 91 %  BP: ()/(56-89) 90/59     Weight: (!) 159.2 kg (351 lb)  Body mass index is 41.62 kg/m².    Intake/Output Summary (Last 24 hours) at 10/5/2020 1254  Last data filed at 10/5/2020 1100  Gross per 24 hour   Intake 1542 ml   Output 4325 ml   Net -2783 ml      Physical Exam  Vitals signs and nursing note reviewed.   Constitutional:       Appearance: Normal appearance. He is obese. He is not  ill-appearing, toxic-appearing or diaphoretic.   HENT:      Head: Normocephalic and atraumatic.      Mouth/Throat:      Mouth: Mucous membranes are moist.      Pharynx: No oropharyngeal exudate.   Eyes:      General: No scleral icterus.     Extraocular Movements: Extraocular movements intact.      Conjunctiva/sclera: Conjunctivae normal.      Pupils: Pupils are equal, round, and reactive to light.   Neck:      Musculoskeletal: Normal range of motion and neck supple.   Cardiovascular:      Rate and Rhythm: Normal rate and regular rhythm.      Pulses: Normal pulses.      Heart sounds: No murmur.   Pulmonary:      Comments: On room air  Appears more comfortable today  Talking in complete sentences, no accessory muscle use  Bilateral wheeze      Chest:      Chest wall: No tenderness.   Abdominal:      General: Abdomen is flat. Bowel sounds are normal. There is distension.      Tenderness: There is no abdominal tenderness. There is no guarding or rebound.   Musculoskeletal:      Comments: Swelling improved today  No pitting edema   Lymphadenopathy:      Cervical: No cervical adenopathy.   Skin:     General: Skin is warm and dry.      Capillary Refill: Capillary refill takes less than 2 seconds.   Neurological:      General: No focal deficit present.      Mental Status: He is alert and oriented to person, place, and time. Mental status is at baseline.   Psychiatric:         Mood and Affect: Mood normal.         Significant Labs: All pertinent labs within the past 24 hours have been reviewed.    Significant Imaging: I have reviewed all pertinent imaging results/findings within the past 24 hours.

## 2020-10-05 NOTE — PLAN OF CARE
Lasix IV push administered as ordered. BUN/Creatine: 0.9/20. K 4.4 and Mag 2.1. Paced on monitor with HR 60-70s. Patient on O2 saturation with 97% on room air. 1500 FR encouraged. Intake and output measured. VSS, AOX4, no reports of pain. No occurrences of falls throughout shift. POC reviewed with pt and pt verbalized understanding. Pt in bed at lowest position with wheels locked, 2x upper side rails raised, call light within reach. Will continue to monitor.

## 2020-10-05 NOTE — PLAN OF CARE
Patient alert and responsive to nursing care.  Vital signs WNL   No c/o pain and discomfort . No cardiopulmonary distress. Paced on monitor PIV patent and Lasix 80 mg IVP.  Tolerating medication, no adverse reaction.  Safety and comfort measure maintained.  Call light and bedside table with reach.   Fall precaution maintained.  Planned of care received and patient education provided. Needs met in timely matter.  Continue to monitor patient for changes of condition.

## 2020-10-05 NOTE — PT/OT/SLP PROGRESS
Physical Therapy Treatment    Patient Name:  Papito Bhakta   MRN:  [id number]  Admitting Diagnosis:  Acute hypoxemic respiratory failure   Recent Surgery: Procedure(s) (LRB):  INSERTION, ICD, BIVENTRICULAR (N/A) 7 Days Post-Op  Admit Date: 9/28/2020  Length of Stay: 5 days    Recommendations:     Discharge Recommendations:  nursing facility, skilled   Discharge Equipment Recommendations: other (see comments)(tbd pending progress and discharge disposition)   Barriers to discharge: current level of assist required    Assessment:     Papito Bhakta is a 65 y.o. male admitted with a medical diagnosis of Acute hypoxemic respiratory failure.  He presents with the following impairments/functional limitations:  weakness, impaired endurance, impaired self care skills, impaired functional mobilty, gait instability, impaired balance, decreased upper extremity function, decreased lower extremity function, pain, impaired skin, impaired fine motor, impaired cardiopulmonary response to activity. Pt with good participation in session on this date. Pt still with difficulty/non-compliance with pacemaker precautions despite cueing but appeared more amenable to education on this date. Pt continues to require increased assist for all aspects of mobility due to pt stature as well as overall deconditioning/weakness. Pt will benefit from SNF after discharge from acute services in order to continue to progress pt's strength, endurance, and balance to help pt maximize independence at or near PLOF Pt will continue to benefit from skilled PT services during this hospital admit to continue to improve transfer ability and efficiency as well as continue to progress pt's ambulation distance and cardiopulmonary endurance to maximize pt's functional independence and return to PLOF.    Rehab Prognosis: Good; patient would benefit from acute skilled PT services to address these deficits and reach maximum level of function.    Recent Surgery:  Procedure(s) (LRB):  INSERTION, ICD, BIVENTRICULAR (N/A) 7 Days Post-Op    Plan:     During this hospitalization, patient to be seen 4 x/week to address the identified rehab impairments via gait training, therapeutic activities, therapeutic exercises, neuromuscular re-education and progress toward the following goals:    · Plan of Care Expires:  10/27/20    Subjective     RN notified prior to session. No family/friends present upon PT entrance into room.    Chief Complaint: Pt reporting difficulty maintaing pacemaker precautions  Patient/Family Comments/goals: get stronger  Pain/Comfort:  · Pain Rating 1: other (see comments)(did not rate)  · Location 1: (bilat hips, knees, ankles)  · Pain Addressed 1: Pre-medicate for activity, Reposition, Distraction, Cessation of Activity, Nurse notified  · Pain Rating Post-Intervention 1: other (see comments)(no change)      Objective:     Additional staff present: OT for cotx due to pt's multiple deficits req'ing two skilled therapists to appropriately progress pt's musculoskeletal strength and endurance while appropriately facilitating neuromuscular postural balance and control    Patient found HOB elevated with: telemetry   Mental Status: Patient is oriented to AxOx4 and follows multistep commands. Patient is Alert and Cooperative during session.    General Precautions: Standard, Cardiac fall, pacemaker   Orthopedic Precautions:N/A   Braces: Sling and swathe   Body mass index is 41.62 kg/m².  Oxygen Device: Room Air    Outcome Measures:  AM-PAC 6 CLICK MOBILITY  Turning over in bed (including adjusting bedclothes, sheets and blankets)?: 2  Sitting down on and standing up from a chair with arms (e.g., wheelchair, bedside commode, etc.): 2  Moving from lying on back to sitting on the side of the bed?: 2  Moving to and from a bed to a chair (including a wheelchair)?: 2  Need to walk in hospital room?: 1  Climbing 3-5 steps with a railing?: 1  Basic Mobility Total Score:  10     Functional Mobility:    Bed Mobility:   · Supine to Sit: maximal assistance and with HOB elevated; from Rt side of bed  · Increased time for sequencing and cueing  · Scooting anteriorly to EOB to have both feet planted on floor: contact guard assistance  · Sit to Supine: maximal assistance and 2 persons; to Rt side of bed    Sitting Balance at Edge of Bed:   Assistance Level Required: Supervision   Comments: Pt used sitting rest breaks for recovery with SBA/Supervision level. Pt with noted SOB after therapist assisted pt with dang smithing and ilianae    Transfers:   · Sit <> Stand Transfer: moderate assistance and of 2 persons with no assistive device   · Stand <> Sit Transfer: moderate assistance and of 2 persons with hand-held assist   · [id number] from EOB     Standing Balance:   Assistance Level Required: Contact Guard Assistance and Minimal Assistance and of 2 persons   Patient used: no assistive device    Time: 5 minutes; 3 minutes (seated rest breaks)   Postural deviations noted: slouched posture and rounded shoulders   Comments: Pt with noted offloading of RLE and resting of posterior legs on back of bed for support. Pt required cueing for upright posture and was only able to maintain with use of LUE on bedside table. Pt able to perform reaches inside DANTE with use of hip strategy and no LOB. Pt with noted increase in SOB once standing which increased with activity      Gait:  · Patient ambulated: ~5 side steps to Rt to HOB   · Patient required: moderate assist and of 2 persons  · Patient used:  hand-held assist   · Impairments due to: pain, decreased strength and decreased endurance  · all lines remained intact throughout ambulation trial  · Comments: Pt requires Mod A to maintain balance during lateral weight shift to clear opposite foot. Pt with decreased hip flexion strength to clear leg requiring compensatory lateral lean.    Education:   Time provided for education, counseling and  discussion of health disposition in regards to patient's current status   All questions answered within PT scope of practice and to patient's satisfaction   PT role in POC to address current functional deficits   Pt educated on proper body mechanics, safety techniques, and energy conservation with PT facilitation and cueing throughout session   Whiteboard updated with pt's current mobility status documented above    Pacemaker precautions reviewed prior to start of session but patient continues to require max assist to maintain during session    Patient left HOB elevated with all lines intact, call button in reach and RN present.    GOALS:   Multidisciplinary Problems     Physical Therapy Goals        Problem: Physical Therapy Goal    Goal Priority Disciplines Outcome Goal Variances Interventions   Physical Therapy Goal     PT, PT/OT Ongoing, Progressing     Description: Goals to be met by: 10/13/20     Patient will increase functional independence with mobility by performin. Supine to sit with Moderate Assistance.  2. Sit to supine with Moderate Assistance.  3. Sit to stand transfer with Moderate Assistance, with ankit-walker PRN, maintaining Pacemaker precautions.  4. Bed to chair transfer with Moderate Assistance, with ankit-walker PRN, maintaining Pacemaker precautions.  5. Gait  x 50 feet with Moderate Assistance, with ankit-walker PRN, maintaining Pacemaker precautions.   6. Ascend/Descend 6 inch curb step with Moderate Assistance, with ankit-walker PRN, maintaining Pacemaker precautions.  7. Lower extremity exercise program x 20 reps per handout, with assistance as needed.                   Time Tracking:     PT Received On: 10/05/20  PT Start Time: 1402     PT Stop Time: 1440  PT Total Time (min): 38 min       Billable Minutes:   · Therapeutic Activity 15 and Neuromuscular Re-education 23    Treatment Type: Treatment  PT/PTA: PT       Ana Palacios PT, DPT  10/5/2020  Pager: [phone]

## 2020-10-05 NOTE — PROGRESS NOTES
Aquacel dressing removed from RUCW incision. No drainage or hematoma noted. Glue residue intact. Merlin monitor initialized today. Device interrogation done. Report will be in [place].

## 2020-10-05 NOTE — SUBJECTIVE & OBJECTIVE
Interval History: No acute events overnight.     Scheduled Meds:   furosemide (LASIX) IV  80 mg Intravenous Q8H    gabapentin  100 mg Oral TID    heparin (porcine)  7,500 Units Subcutaneous Q8H    metoprolol succinate  50 mg Oral Daily    polyethylene glycol  17 g Oral Daily    pravastatin  80 mg Oral Daily    sacubitriL-valsartan  1 tablet Oral BID    senna-docusate 8.6-50 mg  1 tablet Oral BID     PRN Meds:acetaminophen, dextrose 50%, dextrose 50%, glucagon (human recombinant), glucose, glucose, melatonin, sodium chloride 0.9%    Review of patient's allergies indicates:  No Known Allergies  Objective:     Vital Signs (Most Recent):  Temp: 98.1 °F (36.7 °C) (10/05/20 0739)  Pulse: 85 (10/05/20 0739)  Resp: 18 (10/05/20 0739)  BP: 135/89 (10/05/20 0739)  SpO2: 98 % (10/05/20 0739) Vital Signs (24h Range):  Temp:  [97.7 °F (36.5 °C)-98.6 °F (37 °C)] 98.1 °F (36.7 °C)  Pulse:  [70-88] 85  Resp:  [18] 18  SpO2:  [82 %-99 %] 98 %  BP: (103-135)/(56-89) 135/89     No data found.  Body mass index is 41.62 kg/m².      Intake/Output Summary (Last 24 hours) at 10/5/2020 1050  Last data filed at 10/5/2020 1000  Gross per 24 hour   Intake 1782 ml   Output 4625 ml   Net -2843 ml        Telemetry: NSR with occasional NSVT    Physical Exam  Vitals signs and nursing note reviewed.   Constitutional:       Appearance: He is well-developed.   HENT:      Head: Normocephalic.   Eyes:      Pupils: Pupils are equal, round, and reactive to light.   Neck:      Musculoskeletal: Normal range of motion and neck supple.      Comments: + JVD.  Cardiovascular:      Rate and Rhythm: Normal rate and regular rhythm.      Heart sounds: Murmur present.   Pulmonary:      Effort: Pulmonary effort is normal.      Breath sounds: Normal breath sounds.   Abdominal:      General: Bowel sounds are normal. There is distension.      Palpations: Abdomen is soft.   Musculoskeletal: Normal range of motion.      Right lower leg: Edema (improving) present.       Left lower leg: Edema (improving) present.   Skin:     General: Skin is warm and dry.   Neurological:      Mental Status: He is alert and oriented to person, place, and time.   Psychiatric:         Behavior: Behavior normal.       Significant Labs:  CBC:  Recent Labs   Lab 10/03/20  0512 10/04/20  0434 10/05/20  0340   WBC 7.92 6.95 7.37   RBC 4.89 5.19 5.29   HGB 11.4* 11.9* 12.1*   HCT 40.3 41.9 41.8    229 233   MCV 82 81* 79*   MCH 23.3* 22.9* 22.9*   MCHC 28.3* 28.4* 28.9*     BNP:  Recent Labs   Lab 09/29/20  1539   *     CMP:  Recent Labs   Lab 10/03/20  0512 10/03/20  1402 10/04/20  0434 10/05/20  0340   GLU 88 112* 95 90   CALCIUM 8.9 8.8 9.1 9.3   ALBUMIN 2.6*  --  2.5* 2.6*   PROT 7.5  --  7.3 7.7   * 144 144 141   K 3.7 4.0 4.4 4.4   CO2 43* 40* 42* 36*   CL 92* 94* 93* 94*   BUN 14 17 20 27*   CREATININE 1.0 0.9 0.9 1.0   ALKPHOS 85  --  83 86   ALT 14  --  14 15   AST 20  --  19 20   BILITOT 1.2*  --  1.1* 1.1*      Coagulation:   No results for input(s): PT, INR, APTT in the last 168 hours.  LDH:  No results for input(s): LDH in the last 72 hours.  Microbiology:  Microbiology Results (last 7 days)     ** No results found for the last 168 hours. **        I have reviewed all pertinent labs within the past 24 hours.    Estimated Creatinine Clearance: 122 mL/min (based on SCr of 1 mg/dL).    Diagnostic Results:  I have reviewed all pertinent imaging results/findings within the past 24 hours.

## 2020-10-05 NOTE — CONSULTS
[facility]  Heart Transplant  Progress Note    Patient Name: Papito Bhakta  MRN: [id number]  Admission Date: 9/28/2020  Hospital Length of Stay: 5 days  Attending Physician: Skye Inman MD  Primary Care Provider: Brynn To MD  Principal Problem:Acute hypoxemic respiratory failure    Subjective:     Interval History: No acute events overnight.     Scheduled Meds:   furosemide (LASIX) IV  80 mg Intravenous Q8H    gabapentin  100 mg Oral TID    heparin (porcine)  7,500 Units Subcutaneous Q8H    metoprolol succinate  50 mg Oral Daily    polyethylene glycol  17 g Oral Daily    pravastatin  80 mg Oral Daily    sacubitriL-valsartan  1 tablet Oral BID    senna-docusate 8.6-50 mg  1 tablet Oral BID     PRN Meds:acetaminophen, dextrose 50%, dextrose 50%, glucagon (human recombinant), glucose, glucose, melatonin, sodium chloride 0.9%    Review of patient's allergies indicates:  No Known Allergies  Objective:     Vital Signs (Most Recent):  Temp: 98.1 °F (36.7 °C) (10/05/20 0739)  Pulse: 85 (10/05/20 0739)  Resp: 18 (10/05/20 0739)  BP: 135/89 (10/05/20 0739)  SpO2: 98 % (10/05/20 0739) Vital Signs (24h Range):  Temp:  [97.7 °F (36.5 °C)-98.6 °F (37 °C)] 98.1 °F (36.7 °C)  Pulse:  [70-88] 85  Resp:  [18] 18  SpO2:  [82 %-99 %] 98 %  BP: (103-135)/(56-89) 135/89     No data found.  Body mass index is 41.62 kg/m².      Intake/Output Summary (Last 24 hours) at 10/5/2020 1050  Last data filed at 10/5/2020 1000  Gross per 24 hour   Intake 1782 ml   Output 4625 ml   Net -2843 ml        Telemetry: NSR with occasional NSVT    Physical Exam  Vitals signs and nursing note reviewed.   Constitutional:       Appearance: He is well-developed.   HENT:      Head: Normocephalic.   Eyes:      Pupils: Pupils are equal, round, and reactive to light.   Neck:      Musculoskeletal: Normal range of motion and neck supple.      Comments: + JVD.  Cardiovascular:      Rate and Rhythm: Normal rate and regular rhythm.       Heart sounds: Murmur present.   Pulmonary:      Effort: Pulmonary effort is normal.      Breath sounds: Normal breath sounds.   Abdominal:      General: Bowel sounds are normal. There is distension.      Palpations: Abdomen is soft.   Musculoskeletal: Normal range of motion.      Right lower leg: Edema (improving) present.      Left lower leg: Edema (improving) present.   Skin:     General: Skin is warm and dry.   Neurological:      Mental Status: He is alert and oriented to person, place, and time.   Psychiatric:         Behavior: Behavior normal.       Significant Labs:  CBC:  Recent Labs   Lab 10/03/20  0512 10/04/20  0434 10/05/20  0340   WBC 7.92 6.95 7.37   RBC 4.89 5.19 5.29   HGB 11.4* 11.9* 12.1*   HCT 40.3 41.9 41.8    229 233   MCV 82 81* 79*   MCH 23.3* 22.9* 22.9*   MCHC 28.3* 28.4* 28.9*     BNP:  Recent Labs   Lab 09/29/20  1539   *     CMP:  Recent Labs   Lab 10/03/20  0512 10/03/20  1402 10/04/20  0434 10/05/20  0340   GLU 88 112* 95 90   CALCIUM 8.9 8.8 9.1 9.3   ALBUMIN 2.6*  --  2.5* 2.6*   PROT 7.5  --  7.3 7.7   * 144 144 141   K 3.7 4.0 4.4 4.4   CO2 43* 40* 42* 36*   CL 92* 94* 93* 94*   BUN 14 17 20 27*   CREATININE 1.0 0.9 0.9 1.0   ALKPHOS 85  --  83 86   ALT 14  --  14 15   AST 20  --  19 20   BILITOT 1.2*  --  1.1* 1.1*      Coagulation:   No results for input(s): PT, INR, APTT in the last 168 hours.  LDH:  No results for input(s): LDH in the last 72 hours.  Microbiology:  Microbiology Results (last 7 days)     ** No results found for the last 168 hours. **        I have reviewed all pertinent labs within the past 24 hours.    Estimated Creatinine Clearance: 122 mL/min (based on SCr of 1 mg/dL).    Diagnostic Results:  I have reviewed all pertinent imaging results/findings within the past 24 hours.    Assessment and Plan:     66 y/o male with NICM, EF 18%, HTN, HLP, S/P Bi-V ICD(initially placed 02/13/2019-->removed 6/2020 due to infection-->re-implanted 9/28), who  is admitted to Medicine for ADHF after his ICD re-implantation. Last RHC/LHC in 2018 with normal coronaries, PW: (7), PA: 42, CO THERM: 7.51, RA:  (1), LVEDP: 15. Echo today with EF 18%, Mod MR, Mild-Mod TR, LVIDD 7.17, TAPSE 1.91.  He states that he has had the diagnosis of heart failure since 1995. Currently volume overloaded on exam and states that he has been this way for a few months. He states that his legs have been edematous since his first ICD was removed. He has been admitted for HF exacerbation at least once in the last year. Currently describes NYHA class IV symptoms. Unable to lie flat comfortably. Denies CP, palpitations, syncope, presyncope, fevers, n/v/d.      Acute on chronic combined systolic and diastolic heart failure  -NICM. EF 18%. NYHA class IV symptoms.   -Volume up on exam but improving since last week. Continue IV Lasix until euvolemic.  -GDMT: Continue Entresto 49-51(can up titrate to  vs  restart spironolactone). Transitioned to metoprolol succinate(Would keep BB at low dose for now and can up-titrate very slowly as outpt once euvolemic).   -Echo 9/30: EF 18%, Mod MR, Mild-Mod TR, LVIDD 7.17, TAPSE 1.91.      Thank you for your consult. We sill sign off. Please contact us if you have any additional questions.    Boris Valiente NP  Heart Transplant  [facility]

## 2020-10-05 NOTE — PT/OT/SLP PROGRESS
"Occupational Therapy   Treatment    Name: Papito Bhakta  MRN: [id number]  Admitting Diagnosis:  Acute hypoxemic respiratory failure      7 Days Post-Op  Pre-op Diagnosis: Chronic combined systolic and diastolic congestive heart failure [I50.42]NICM (nonischemic cardiomyopathy) [I42.8]    Procedure(s):  INSERTION, ICD, BIVENTRICULAR     Recommendations:     Discharge Recommendations: nursing facility, skilled  Discharge Equipment Recommendations:  (TBD)  Barriers to discharge:  (requiring increased level of assist with ADLs and mobility)    Assessment:     Papito Bhakta is a 65 y.o. male with a medical diagnosis of Acute hypoxemic respiratory failure.  He presents with the following performance deficits affecting function: weakness, impaired endurance, impaired self care skills, impaired functional mobilty, gait instability, impaired balance, pain, decreased upper extremity function, edema, impaired cardiopulmonary response to activity. Patient with improved participation this session and more receptive to therapist instruction/feedback throughout session. Patient continues to require assist of 2 for bed mobility and sit <> stand transfers. At this time, patient will continue to benefit from acute skilled therapy intervention to address deficits/underlying impairments and progress towards prior level of function. After discharge, patient would benefit from continued skilled therapy intervention at SNF to progress more towards independence in ADLs and functional mobility before going home.    Rehab Prognosis:  Good; patient would benefit from acute skilled OT services to address these deficits and reach maximum level of function.       Plan:     Patient to be seen 4 x/week to address the above listed problems via self-care/home management, therapeutic activities, therapeutic exercises  · Plan of Care Expires: 11/29/20  · Plan of Care Reviewed with: patient    Subjective     Patient stated "it's hard when you can only " "use 1 hand".     Pain/Comfort:  · Pain Rating 1: (Did not rate but c/o pain at B knees, B ankles, and L hip)  · Pain Addressed 1: Pre-medicate for activity, Reposition, Distraction, Cessation of Activity    Objective:     Communicated with: RN prior to session. Patient found HOB elevated with telemetry upon OT entry to room. PT present for co-tx as appropriate for patient 2* decreased activity tolerance and level of skilled assist needed to complete tasks.    General Precautions: Standard, fall, pacemaker   Orthopedic Precautions:N/A   Braces: UE Sling     Occupational Performance:     Bed Mobility:    · Patient completed Supine to Sit with maximal assistance; HOB elevated  · Cues provided for sequencing of steps and maintaining pacemaker precautions  · Patient completed Sit to Supine with maximal assistance and 2 persons; HOB elevated    Functional Mobility/Transfers:  · Patient completed Sit <> Stand Transfer with moderate assistance and of 2 persons from elevated bed  · 2x from EOB  · R UE sling donned before sit <> stand transfers attempted this session to assist with maintaining pacemaker precautions  · Patient presents with difficulty releasing L UE from bed to grab therapist's hand for hand-held assist in standing  · Functional Mobility: Patient took side steps along EOB towards R side with mod assist of 2 with hand-held assist     Balance:   · Static sitting: supervision/SBA EOB  · Static standing: CGA/min assist standing EOB  · Verbal and tactile cues provided for upright posture  · Patient tolerated standing ~5 min 1st trial and ~3 min 2nd trial    Activities of Daily Living:  · Grooming: minimum assistance in standing  · Lower Body Dressing: total assistance to doff/edilberto socks sitting EOB  · Toileting: minimum assistance for balance standing to urinate    AMPA 6 Click ADL: 14    Treatment & Education:   Reviewed pacemaker precautions at start of session. Continues to need reinforcement.    Therapist " provided facilitation and instruction of proper body mechanics and fall prevention strategies during tasks listed above.   Performed 2x10 L UE shoulder flexion AAROM exercises sitting EOB.   Instructed patient to sit EOB daily to increase OOB/activity tolerance.   Instructed patient to use call light to have nursing staff assist with needs/transfers.   Discussed OT POC and answered all questions within OT scope of practice.   Whiteboard updated     Patient left HOB elevated with all lines intact, call button in reach and RN notifiedEducation:      GOALS:   Multidisciplinary Problems     Occupational Therapy Goals        Problem: Occupational Therapy Goal    Goal Priority Disciplines Outcome Interventions   Occupational Therapy Goal     OT, PT/OT Ongoing, Progressing    Description: Goals to be met by: 10/14/2020    Patient will increase functional independence with ADLs by performing:    UE Dressing with Supervision.  LE Dressing with Minimal Assistance.  Grooming while standing with Contact Guard Assistance.  Supine to sit with Minimal Assistance.  Toilet transfer to toilet with Minimal Assistance.                     Time Tracking:     OT Date of Treatment: 10/05/20  OT Start Time: 1402  OT Stop Time: 1440  OT Total Time (min): 38 min    Billable Minutes:Self Care/Home Management 15  Therapeutic Activity 10  Therapeutic Exercise 13    Elaine Zamudio, OT  10/5/2020

## 2020-10-05 NOTE — PROGRESS NOTES
[facility] Hospital Medicine  Progress Note    Patient Name: Papito Bhakta  MRN: [id number]  Patient Class: IP- Inpatient   Admission Date: 9/28/2020  Length of Stay: 5 days  Attending Physician: Skye Inman MD  Primary Care Provider: Brynn To MD    Hospital Medicine Team: [facility]OS[facility] MED 3 Jennifer Ledezma MD    Subjective:     Principal Problem:Acute hypoxemic respiratory failure      HPI:  Mr. Bhakta is a 66 yo gentleman with PMH of NICM with AICD and recent R CRT-D placement 9/27, hypertension, HFpEF/HFrEF (EF 25% June 2020), hyperlipidemia, and obesity presenting for shortness of breath and hypoxia. On September 27 he underwent placement of cardiac resynchronization therapy pacemaker with Dr. Kwong. After procedure it was difficult to awaken patient. His pH was 7.26 and CO2 was elevated, and he was subsequently placed on BiPAP then weaned down to room air after diuresis. It was recommended he be admitted for volume overload in the setting of hypoxemia. He has been experiencing increased shortness of breath and lower extremity swelling for the past few months since his bi-V ICD was removed in June 2020 secondary to infection. He reports 2 pillow orthopnea, dyspnea ambulating from his room to the kitchen, and a 20# weight gain. Previously he was more active and able to do household chores and yard work. He has been compliant with his home medications and has been alternating between taking 80 mg of lasix once to twice daily though he was prescribed daily on discharge from the hospital in June. He denies chest pain, fevers, chills, weight loss, nausea, vomiting, diarrhea, melena/hematochezia, and dysuria.     ED/Post-Op Course:  On room air. CXR showed cardiomegaly, pulmonary edema, and pleural effusion. Received 60 mg IV lasix x2 with good urine output and improved respiratory status. CMP revealed increased CO2.     Overview/Hospital Course:  Admitted to hospital medicine for  management of acute hypoxemic respiratory failure likely secondary to acute on chronic combined HFpEF/HFrEF s/p Bi-V ICD placement. No further bipap requirements in hospital stay. CXR with cardiomegaly, cephalization, pleural effusions, and congestion. Repeat EF 18%, G3DD, PAP 50s. Diuresed and HTS following. Started on entresto, toprol, and continuing IV lasix.     Interval History: No events overnight. Vital signs stable, oxygen sats >95% room air. No bowel movement in multiple days. Urine output 4.9 L overnight, net negative 3L.     Review of Systems   Constitutional: Positive for unexpected weight change (weight gain ). Negative for activity change, chills, diaphoresis, fatigue and fever.   HENT: Negative for facial swelling and trouble swallowing.    Eyes: Negative for visual disturbance.   Respiratory: Negative for apnea, cough, choking, chest tightness, shortness of breath and wheezing.    Cardiovascular: Positive for leg swelling. Negative for chest pain and palpitations.   Gastrointestinal: Positive for abdominal distention. Negative for abdominal pain, blood in stool, constipation, diarrhea, nausea and vomiting.   Endocrine: Negative for cold intolerance, heat intolerance and polyuria.   Genitourinary: Negative for dysuria and hematuria.   Musculoskeletal: Negative for back pain and myalgias.   Skin: Negative for color change, pallor and wound.   Neurological: Negative for dizziness, tremors and weakness.   Hematological: Negative for adenopathy.   Psychiatric/Behavioral: Negative for agitation, behavioral problems and confusion.     Objective:     Vital Signs (Most Recent):  Temp: 98.2 °F (36.8 °C) (10/05/20 1121)  Pulse: 85 (10/05/20 1121)  Resp: 18 (10/05/20 1121)  BP: (!) 90/59 (10/05/20 1121)  SpO2: (!) 91 % (10/05/20 1121) Vital Signs (24h Range):  Temp:  [97.7 °F (36.5 °C)-98.6 °F (37 °C)] 98.2 °F (36.8 °C)  Pulse:  [75-90] 85  Resp:  [18] 18  SpO2:  [91 %-99 %] 91 %  BP: ()/(56-89) 90/59      Weight: (!) 159.2 kg (351 lb)  Body mass index is 41.62 kg/m².    Intake/Output Summary (Last 24 hours) at 10/5/2020 1254  Last data filed at 10/5/2020 1100  Gross per 24 hour   Intake 1542 ml   Output 4325 ml   Net -2783 ml      Physical Exam  Vitals signs and nursing note reviewed.   Constitutional:       Appearance: Normal appearance. He is obese. He is not ill-appearing, toxic-appearing or diaphoretic.   HENT:      Head: Normocephalic and atraumatic.      Mouth/Throat:      Mouth: Mucous membranes are moist.      Pharynx: No oropharyngeal exudate.   Eyes:      General: No scleral icterus.     Extraocular Movements: Extraocular movements intact.      Conjunctiva/sclera: Conjunctivae normal.      Pupils: Pupils are equal, round, and reactive to light.   Neck:      Musculoskeletal: Normal range of motion and neck supple.   Cardiovascular:      Rate and Rhythm: Normal rate and regular rhythm.      Pulses: Normal pulses.      Heart sounds: No murmur.   Pulmonary:      Comments: On room air  Appears more comfortable today  Talking in complete sentences, no accessory muscle use  Bilateral wheeze      Chest:      Chest wall: No tenderness.   Abdominal:      General: Abdomen is flat. Bowel sounds are normal. There is distension.      Tenderness: There is no abdominal tenderness. There is no guarding or rebound.   Musculoskeletal:      Comments: Swelling improved today  No pitting edema   Lymphadenopathy:      Cervical: No cervical adenopathy.   Skin:     General: Skin is warm and dry.      Capillary Refill: Capillary refill takes less than 2 seconds.   Neurological:      General: No focal deficit present.      Mental Status: He is alert and oriented to person, place, and time. Mental status is at baseline.   Psychiatric:         Mood and Affect: Mood normal.         Significant Labs: All pertinent labs within the past 24 hours have been reviewed.    Significant Imaging: I have reviewed all pertinent imaging  results/findings within the past 24 hours.      Assessment/Plan:      * Acute hypoxemic respiratory failure  Non-O2 dependent with need for BiPAP following bi-V ICD placement. Subsequently weaned to RA with diuresis. 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 CXR with pulmonary edema, pleural effusions, and cardiomegaly. Received 2 doses of 60 mg IV lasix s/p procedure.  DDX: Acute heart failure exacerbation , ACS (less likely), COPD (no history of this but patient does have smoking history), PNA (no fevers, chills)  - improving - appears more euvolemic on exam  - unable to obtain daily weights  - HTS consultation per EP recs, have signed off    Plan:  -   - lasix gtt 10/2, dc 10/3  - furosemide 80 TID - plan to transition to PO tomorrow  - repeat BMP this PM to check Cr  - lopressor 25 BID, last dose 10/3  - toprol 50  - entresto 49-51  - hold aldactone, consider restarting once on PO lasix  - K>4, Mg >2 - replete as needed      Chronic combined systolic and diastolic congestive heart failure  History of combined systolic and diastolic dysfunction, s/p CRT-D 9/28 with continued volume overload. Has AICD. Compliant with home medications.    - Please see Acute Hypoxemic Respiratory Failure      Essential hypertension  Home meds: carvedilol 12.5 BID, ramipiril, aldactone  - switched from carvedilol to lopressor 25 BID  - start toprol 50 10/4  - entresto 49-51  - hold aldactone, can consider restarting if blood pressure tolerates increased entresto dose and once pt is on PO diuresis      Hyperlipidemia  Continue home pravastatin    NICM (nonischemic cardiomyopathy)  Please see Acute Hypoxemic Respiratory Failure      Biventricular ICD (implantable cardioverter-defibrillator) in place  Placed 9/28 with Dr. Kwong.    - completed prophylactic keflex dose        VTE Risk Mitigation (From admission, onward)         Ordered     heparin (porcine)  injection 7,500 Units  Every 8 hours      09/29/20 1417     IP VTE HIGH RISK PATIENT  Once      09/29/20 1417     Place sequential compression device  Until discontinued      09/29/20 1417                Discharge Planning   MARIIA: 10/7/2020     Code Status: Full Code   Is the patient medically ready for discharge?: No    Reason for patient still in hospital (select all that apply): Treatment  Discharge Plan A: Skilled Nursing Facility              Jennifer Ledezma MD  Department of Hospital Medicine   [facility]

## 2020-10-05 NOTE — MEDICAL/APP STUDENT
[facility]  Progress Note    Patient Name: Papito Bhakta  MRN: [id number]  Patient Class: IP- Inpatient   Admission Date: 9/28/2020  Length of Stay: 5 days  Attending Physician: Skye Inman MD  Primary Care Provider: Brynn To MD    Active Diagnoses:    Diagnosis Date Noted POA    PRINCIPAL PROBLEM:  Acute hypoxemic respiratory failure [J96.01] 01/05/2019 Yes    Acute on chronic combined systolic and diastolic heart failure [I50.43] 09/30/2020 Yes    NICM (nonischemic cardiomyopathy) [I42.8] 06/16/2020 Yes     Chronic    Biventricular ICD (implantable cardioverter-defibrillator) in place [Z95.810] 05/14/2019 Yes    Hyperlipidemia [E78.5] 01/05/2019 Yes     Chronic    Chronic combined systolic and diastolic congestive heart failure [I50.42] 12/01/2017 Yes    Essential hypertension [I10] 12/01/2017 Yes     Chronic      Problems Resolved During this Admission:           Subjective:           HPI:  Mr. 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 Patient had ICD replacement 9/28/20 here w/o initial complication. Following the procedure, the patient was noted to be difficult to arouse, acidotic with increased CO2. BiPap was started and patient eventually weaned off onto room air following diuresis with 60 mg Lasix x2. He was then admitted for volume overload in setting of hypoxemia likely secondary to acute on chronic combined HF s/p BiVICD placement. Pt reportedly had been complaining of increased SOB with LE edema for past several months since ICD removal in June 2020. Endorses orthopnea with use of 2 pillows at night, KING when ambulating to the kitchen and recent 20 lb weight gain. Prior to this, he was more active and completed daily chores/yard work. Reports compliance with home meds including Lasix 80, which he sometimes has been taking BID as  "opposed to qd as prescribed. Denies fever, chills, N/V, diarrhea, melena/hematochezia or dysuria. No further complaints or concerns at this time.         "ED/Post-Op Course:  On room air. CXR showed cardiomegaly, pulmonary edema, and pleural effusion. Received 60 mg IV lasix x2 with good urine output and improved respiratory status. CMP revealed increased CO2."     "Overview/Hospital Course:  Admitted to hospital medicine for management of acute hypoxemic respiratory failure likely secondary to acute on chronic combined HFpEF/HFrEF s/p Bi-V ICD placement. No further bipap requirements in hospital stay. CXR with cardiomegaly, cephalization, pleural effusions, and congestion. Repeat EF 18%, G3DD, PAP 50s. Diuresed and HTS following. Started on entresto, toprol, and continuing IV lasix. Remains stable on low flow nc."    Interval History: No acute events overnight. Pt on BiPap to sleep but otherwise has no further oxygen requirements. Off of nasal cannula at this time. UO -3.1 L in last 24 hours, 14.3 L net negative fluid loss since admission. Still no BM since admission. Has attempted to pass BM but only able to pass air. Denies any abdominal pain. Also denies any fever, chills, CP, SOB, N/V, dizziness/light headedness. Feels about the same as yesterday.     Review of Systems   Constitutional: Positive for unexpected weight change (weight gain ). Negative for activity change, chills, diaphoresis, fatigue and fever.   HENT: Negative for facial swelling and trouble swallowing.    Eyes: Negative for visual disturbance.   Respiratory: Negative for apnea, cough, choking, chest tightness, shortness of breath and wheezing.    Cardiovascular: Positive for leg swelling(up to just superior to knee bilaterally). Negative for chest pain and palpitations.   Gastrointestinal: Positive for abdominal distention. Negative for abdominal pain, blood in stool, diarrhea, nausea and vomiting. Has not passed BM during admission. "   Endocrine: Negative for cold intolerance, heat intolerance and polyuria.   Genitourinary: Negative for dysuria and hematuria.   Musculoskeletal: Negative for back pain and myalgias.   Skin: Negative for color change, pallor and wound.  Neurological: Negative for dizziness, tremors and weakness.   Hematological: Negative for adenopathy.   Psychiatric/Behavioral: Negative for agitation, behavioral problems and confusion.            Vitals:    10/05/20 1121   BP: (!) 90/59   Pulse: 85   Resp: 18   Temp: 98.2 °F (36.8 °C)           Intake/Output Summary (Last 24 hours) at 10/5/2020 1259  Last data filed at 10/5/2020 1100  Gross per 24 hour   Intake 1542 ml   Output 4325 ml   Net -2783 ml          Physical Exam  Vitals signs and nursing note reviewed.   Constitutional:       Appearance: Normal appearance. He is obese. He is not ill-appearing, toxic-appearing or diaphoretic.   HENT:      Head: Normocephalic and atraumatic.      Mouth/Throat:      Mouth: Mucous membranes are moist.      Pharynx: No oropharyngeal exudate.   Eyes:      General: No scleral icterus.     Extraocular Movements: Extraocular movements intact.      Conjunctiva/sclera: Conjunctivae normal.      Pupils: Pupils are equal, round, and reactive to light.   Neck:      Musculoskeletal: Normal range of motion and neck supple.   Cardiovascular:      Rate and Rhythm: Normal rate and regular rhythm.      Pulses: Normal pulses.      Heart sounds: No murmur.   Pulmonary:      Comments: On room air  Appears comfortable  Talking in complete sentences, no accessory muscle use  Course bilateral breath sounds with bilateral expiratory wheeze  Chest:      Chest wall: No tenderness.   Abdominal:      General: Abdomen is flat. Bowel sounds are normal. There is distension.      Tenderness: There is no abdominal tenderness. There is no guarding or rebound.   Musculoskeletal:      Right lower leg: Edema present.      Left lower leg: Edema present.      Comments: Swelling  improved today and is currently at patient's baseline to bilateral LEs  Lymphadenopathy:      Cervical: No cervical adenopathy.   Skin:     General: Skin is warm and dry.      Capillary Refill: Capillary refill takes less than 2 seconds. Scaling, wrinkling and dryness to bilateral LE's consistent with diuresis  Neurological:      General: No focal deficit present.      Mental Status: He is alert and oriented to person, place, and time. Mental status is at baseline.   Psychiatric:         Mood and Affect: Mood normal.            Significant Labs: All pertinent labs within the past 24 hours have been reviewed.     Significant Imaging: I have reviewed all pertinent imaging results/findings within the past 24 hours.              Assessment/Plan:      1. Acute Hypoxic Respiratory Failure secondary to Acute on Chronic Combined Heart Failure Exacerbation  - IV Lasix 80 mg TID - BUN/Cr ratio today was 27/1, plan to recheck CMP this afternoon and adjust Lasix dose accordingly depending on Cr.   -Metoprolol succinate 50 mg qd per HTS recs  -Entresto 49-51 mg BID  -Electrolytes: K>4; Mg>2, replenish prn  -HTS consulted today. Recommended continuing Entresto, titrating up to  VS restart Aldactone. Also transitioned to Metoprolol succinate as above. Appreciate consult.     2. HLD  -continue home Pravastatin 80 mg qd    3. Essential HTN  -Metoprolol 50 mg  -Entresto 49-51 mg  -Hold home Aldactone pending PO Lasix transition and ability to tolerate Entresto  -continue to monitor BP    4. S/p Bi Ventricular ICD replacement 9/28/20  -Given Keflex prophylaxis        VTE Risk Mitigation (From admission, onward)         Ordered     heparin (porcine) injection 7,500 Units  Every 8 hours      09/29/20 1417     IP VTE HIGH RISK PATIENT  Once      09/29/20 1417     Place sequential compression device  Until discontinued      09/29/20 1417                   Randall Diaz  [facility]

## 2020-10-06 PROBLEM — R65.20 SEVERE SEPSIS: Status: ACTIVE | Noted: 2020-10-06

## 2020-10-06 PROBLEM — A41.9 SEVERE SEPSIS: Status: ACTIVE | Noted: 2020-10-06

## 2020-10-06 LAB
ALBUMIN SERPL BCP-MCNC: 2.6 G/DL (ref 3.5–5.2)
ALLENS TEST: ABNORMAL
ALP SERPL-CCNC: 98 U/L (ref 55–135)
ALT SERPL W/O P-5'-P-CCNC: 16 U/L (ref 10–44)
ANION GAP SERPL CALC-SCNC: 13 MMOL/L (ref 8–16)
AST SERPL-CCNC: 25 U/L (ref 10–40)
BACTERIA #/AREA URNS AUTO: ABNORMAL /HPF
BASOPHILS # BLD AUTO: 0.05 K/UL (ref 0–0.2)
BASOPHILS NFR BLD: 0.4 % (ref 0–1.9)
BILIRUB SERPL-MCNC: 1.4 MG/DL (ref 0.1–1)
BILIRUB UR QL STRIP: ABNORMAL
BNP SERPL-MCNC: 214 PG/ML (ref 0–99)
BUN SERPL-MCNC: 29 MG/DL (ref 8–23)
CALCIUM SERPL-MCNC: 9.3 MG/DL (ref 8.7–10.5)
CHLORIDE SERPL-SCNC: 94 MMOL/L (ref 95–110)
CLARITY UR REFRACT.AUTO: ABNORMAL
CO2 SERPL-SCNC: 32 MMOL/L (ref 23–29)
COLOR UR AUTO: ABNORMAL
CREAT SERPL-MCNC: 1.2 MG/DL (ref 0.5–1.4)
DELSYS: ABNORMAL
DIFFERENTIAL METHOD: ABNORMAL
EOSINOPHIL # BLD AUTO: 0 K/UL (ref 0–0.5)
EOSINOPHIL NFR BLD: 0.1 % (ref 0–8)
ERYTHROCYTE [DISTWIDTH] IN BLOOD BY AUTOMATED COUNT: 16.7 % (ref 11.5–14.5)
EST. GFR  (AFRICAN AMERICAN): >60 ML/MIN/1.73 M^2
EST. GFR  (NON AFRICAN AMERICAN): >60 ML/MIN/1.73 M^2
GLUCOSE SERPL-MCNC: 102 MG/DL (ref 70–110)
GLUCOSE UR QL STRIP: NEGATIVE
HCO3 UR-SCNC: 34.6 MMOL/L (ref 24–28)
HCT VFR BLD AUTO: 45.1 % (ref 40–54)
HGB BLD-MCNC: 13.5 G/DL (ref 14–18)
HGB UR QL STRIP: ABNORMAL
HYALINE CASTS UR QL AUTO: 12 /LPF
IMM GRANULOCYTES # BLD AUTO: 0.06 K/UL (ref 0–0.04)
IMM GRANULOCYTES NFR BLD AUTO: 0.4 % (ref 0–0.5)
KETONES UR QL STRIP: NEGATIVE
LACTATE SERPL-SCNC: 1.3 MMOL/L (ref 0.5–2.2)
LACTATE SERPL-SCNC: 2.7 MMOL/L (ref 0.5–2.2)
LEUKOCYTE ESTERASE UR QL STRIP: ABNORMAL
LYMPHOCYTES # BLD AUTO: 1.6 K/UL (ref 1–4.8)
LYMPHOCYTES NFR BLD: 11.2 % (ref 18–48)
MAGNESIUM SERPL-MCNC: 2 MG/DL (ref 1.6–2.6)
MCH RBC QN AUTO: 22.7 PG (ref 27–31)
MCHC RBC AUTO-ENTMCNC: 29.9 G/DL (ref 32–36)
MCV RBC AUTO: 76 FL (ref 82–98)
MICROSCOPIC COMMENT: ABNORMAL
MONOCYTES # BLD AUTO: 1.5 K/UL (ref 0.3–1)
MONOCYTES NFR BLD: 10.2 % (ref 4–15)
NEUTROPHILS # BLD AUTO: 11.1 K/UL (ref 1.8–7.7)
NEUTROPHILS NFR BLD: 77.7 % (ref 38–73)
NITRITE UR QL STRIP: NEGATIVE
NRBC BLD-RTO: 0 /100 WBC
PCO2 BLDA: 49.6 MMHG (ref 35–45)
PH SMN: 7.45 [PH] (ref 7.35–7.45)
PH UR STRIP: 5 [PH] (ref 5–8)
PHOSPHATE SERPL-MCNC: 3 MG/DL (ref 2.7–4.5)
PLATELET # BLD AUTO: 226 K/UL (ref 150–350)
PMV BLD AUTO: 11.5 FL (ref 9.2–12.9)
PO2 BLDA: 80 MMHG (ref 80–100)
POC BE: 11 MMOL/L
POC SATURATED O2: 96 % (ref 95–100)
POC TCO2: 36 MMOL/L (ref 23–27)
POTASSIUM SERPL-SCNC: 4.3 MMOL/L (ref 3.5–5.1)
PROT SERPL-MCNC: 8.2 G/DL (ref 6–8.4)
PROT UR QL STRIP: ABNORMAL
RBC # BLD AUTO: 5.94 M/UL (ref 4.6–6.2)
RBC #/AREA URNS AUTO: 5 /HPF (ref 0–4)
SAMPLE: ABNORMAL
SARS-COV-2 RDRP RESP QL NAA+PROBE: NEGATIVE
SITE: ABNORMAL
SODIUM SERPL-SCNC: 139 MMOL/L (ref 136–145)
SP GR UR STRIP: 1.02 (ref 1–1.03)
SQUAMOUS #/AREA URNS AUTO: 6 /HPF
URN SPEC COLLECT METH UR: ABNORMAL
WBC # BLD AUTO: 14.27 K/UL (ref 3.9–12.7)
WBC #/AREA URNS AUTO: 89 /HPF (ref 0–5)

## 2020-10-06 PROCEDURE — 63600175 PHARM REV CODE 636 W HCPCS: Mod: HCNC | Performed by: INTERNAL MEDICINE

## 2020-10-06 PROCEDURE — 87088 URINE BACTERIA CULTURE: CPT | Mod: HCNC

## 2020-10-06 PROCEDURE — 27000221 HC OXYGEN, UP TO 24 HOURS: Mod: HCNC

## 2020-10-06 PROCEDURE — 84100 ASSAY OF PHOSPHORUS: CPT | Mod: HCNC

## 2020-10-06 PROCEDURE — 63600175 PHARM REV CODE 636 W HCPCS: Mod: HCNC | Performed by: STUDENT IN AN ORGANIZED HEALTH CARE EDUCATION/TRAINING PROGRAM

## 2020-10-06 PROCEDURE — 25000003 PHARM REV CODE 250: Mod: HCNC | Performed by: STUDENT IN AN ORGANIZED HEALTH CARE EDUCATION/TRAINING PROGRAM

## 2020-10-06 PROCEDURE — 83735 ASSAY OF MAGNESIUM: CPT | Mod: HCNC

## 2020-10-06 PROCEDURE — 81001 URINALYSIS AUTO W/SCOPE: CPT | Mod: HCNC

## 2020-10-06 PROCEDURE — 83880 ASSAY OF NATRIURETIC PEPTIDE: CPT | Mod: HCNC

## 2020-10-06 PROCEDURE — 99900035 HC TECH TIME PER 15 MIN (STAT): Mod: HCNC

## 2020-10-06 PROCEDURE — 97112 NEUROMUSCULAR REEDUCATION: CPT | Mod: HCNC

## 2020-10-06 PROCEDURE — 80053 COMPREHEN METABOLIC PANEL: CPT | Mod: HCNC

## 2020-10-06 PROCEDURE — 99233 SBSQ HOSP IP/OBS HIGH 50: CPT | Mod: HCNC,GC,, | Performed by: INTERNAL MEDICINE

## 2020-10-06 PROCEDURE — 20000000 HC ICU ROOM: Mod: HCNC

## 2020-10-06 PROCEDURE — 37799 UNLISTED PX VASCULAR SURGERY: CPT | Mod: HCNC

## 2020-10-06 PROCEDURE — U0002 COVID-19 LAB TEST NON-CDC: HCPCS | Mod: HCNC

## 2020-10-06 PROCEDURE — 85025 COMPLETE CBC W/AUTO DIFF WBC: CPT | Mod: HCNC

## 2020-10-06 PROCEDURE — 87040 BLOOD CULTURE FOR BACTERIA: CPT | Mod: HCNC

## 2020-10-06 PROCEDURE — 99223 PR INITIAL HOSPITAL CARE,LEVL III: ICD-10-PCS | Mod: HCNC,GC,, | Performed by: INTERNAL MEDICINE

## 2020-10-06 PROCEDURE — 83605 ASSAY OF LACTIC ACID: CPT | Mod: HCNC

## 2020-10-06 PROCEDURE — 36415 COLL VENOUS BLD VENIPUNCTURE: CPT | Mod: HCNC

## 2020-10-06 PROCEDURE — 87077 CULTURE AEROBIC IDENTIFY: CPT | Mod: HCNC

## 2020-10-06 PROCEDURE — 99233 PR SUBSEQUENT HOSPITAL CARE,LEVL III: ICD-10-PCS | Mod: HCNC,GC,, | Performed by: INTERNAL MEDICINE

## 2020-10-06 PROCEDURE — 94660 CPAP INITIATION&MGMT: CPT | Mod: HCNC

## 2020-10-06 PROCEDURE — 97530 THERAPEUTIC ACTIVITIES: CPT | Mod: HCNC

## 2020-10-06 PROCEDURE — 99223 1ST HOSP IP/OBS HIGH 75: CPT | Mod: HCNC,GC,, | Performed by: INTERNAL MEDICINE

## 2020-10-06 PROCEDURE — 87186 SC STD MICRODIL/AGAR DIL: CPT | Mod: HCNC

## 2020-10-06 PROCEDURE — 83605 ASSAY OF LACTIC ACID: CPT | Mod: 91,HCNC

## 2020-10-06 PROCEDURE — 82803 BLOOD GASES ANY COMBINATION: CPT | Mod: HCNC

## 2020-10-06 PROCEDURE — 25000003 PHARM REV CODE 250: Mod: HCNC | Performed by: NURSE PRACTITIONER

## 2020-10-06 PROCEDURE — 94761 N-INVAS EAR/PLS OXIMETRY MLT: CPT | Mod: HCNC

## 2020-10-06 PROCEDURE — 87086 URINE CULTURE/COLONY COUNT: CPT | Mod: HCNC

## 2020-10-06 PROCEDURE — 97535 SELF CARE MNGMENT TRAINING: CPT | Mod: HCNC

## 2020-10-06 RX ORDER — NOREPINEPHRINE BITARTRATE/D5W 4MG/250ML
0.02 PLASTIC BAG, INJECTION (ML) INTRAVENOUS CONTINUOUS
Status: DISCONTINUED | OUTPATIENT
Start: 2020-10-06 | End: 2020-10-08

## 2020-10-06 RX ORDER — ACETAMINOPHEN 650 MG/1
650 SUPPOSITORY RECTAL EVERY 6 HOURS PRN
Status: DISCONTINUED | OUTPATIENT
Start: 2020-10-06 | End: 2020-10-15 | Stop reason: HOSPADM

## 2020-10-06 RX ORDER — HEPARIN SODIUM 5000 [USP'U]/ML
5000 INJECTION, SOLUTION INTRAVENOUS; SUBCUTANEOUS EVERY 8 HOURS
Status: DISCONTINUED | OUTPATIENT
Start: 2020-10-06 | End: 2020-10-09

## 2020-10-06 RX ORDER — FUROSEMIDE 10 MG/ML
40 INJECTION INTRAMUSCULAR; INTRAVENOUS 2 TIMES DAILY
Status: DISCONTINUED | OUTPATIENT
Start: 2020-10-06 | End: 2020-10-06

## 2020-10-06 RX ADMIN — HEPARIN SODIUM 5000 UNITS: 5000 INJECTION INTRAVENOUS; SUBCUTANEOUS at 09:10

## 2020-10-06 RX ADMIN — PIPERACILLIN SODIUM AND TAZOBACTAM SODIUM 4.5 G: 4; .5 INJECTION, POWDER, LYOPHILIZED, FOR SOLUTION INTRAVENOUS at 02:10

## 2020-10-06 RX ADMIN — Medication 0.02 MCG/KG/MIN: at 08:10

## 2020-10-06 RX ADMIN — PRAVASTATIN SODIUM 80 MG: 40 TABLET ORAL at 08:10

## 2020-10-06 RX ADMIN — HEPARIN SODIUM 7500 UNITS: 5000 INJECTION INTRAVENOUS; SUBCUTANEOUS at 06:10

## 2020-10-06 RX ADMIN — METOPROLOL SUCCINATE 50 MG: 50 TABLET, EXTENDED RELEASE ORAL at 11:10

## 2020-10-06 RX ADMIN — PIPERACILLIN SODIUM AND TAZOBACTAM SODIUM 4.5 G: 4; .5 INJECTION, POWDER, LYOPHILIZED, FOR SOLUTION INTRAVENOUS at 09:10

## 2020-10-06 RX ADMIN — GABAPENTIN 100 MG: 100 CAPSULE ORAL at 08:10

## 2020-10-06 RX ADMIN — HEPARIN SODIUM 7500 UNITS: 5000 INJECTION INTRAVENOUS; SUBCUTANEOUS at 01:10

## 2020-10-06 RX ADMIN — ACETAMINOPHEN 650 MG: 650 SUPPOSITORY RECTAL at 05:10

## 2020-10-06 RX ADMIN — VANCOMYCIN HYDROCHLORIDE 2500 MG: 1.25 INJECTION, POWDER, LYOPHILIZED, FOR SOLUTION INTRAVENOUS at 06:10

## 2020-10-06 NOTE — SUBJECTIVE & OBJECTIVE
Past Medical History:   Diagnosis Date    Anticoagulant long-term use     Aspirin    CHF (congestive heart failure)     Hyperlipidemia     Hypertension     NICM (nonischemic cardiomyopathy) 2020    Obesity        Past Surgical History:   Procedure Laterality Date    INSERTION OF BIVENTRICULAR IMPLANTABLE CARDIOVERTER-DEFIBRILLATOR (ICD) N/A 2019    Procedure: INSERTION, ICD, BIVENTRICULAR;  Surgeon: Shailesh Eng MD;  Location: [facility] CATH LAB;  Service: Cardiology;  Laterality: N/A;  RN PRE OP -  1ST CASE PER  RADHA. NOTIFIED RADHA THAT ANESTHESIA IS NOT PITO FOR 1ST CASE START-LO    INSERTION OF BIVENTRICULAR IMPLANTABLE CARDIOVERTER-DEFIBRILLATOR (ICD) N/A 2020    Procedure: INSERTION, ICD, BIVENTRICULAR;  Surgeon: Jim Kwong MD;  Location: [facility] EP LAB;  Service: Cardiology;  Laterality: N/A;  NICM, CRT-D, SJM,, MAC, DM, 3 Prep*Wearing LifeVest*    oral extraction  2018    TESTICLE SURGERY         Review of patient's allergies indicates:  No Known Allergies    Family History     Problem Relation (Age of Onset)    Epilepsy Mother        Tobacco Use    Smoking status: Former Smoker     Packs/day: 0.50     Years: 40.00     Pack years: 20.00     Types: Cigarettes, Cigars     Quit date:      Years since quittin.7    Smokeless tobacco: Never Used   Substance and Sexual Activity    Alcohol use: Yes     Comment: once a month    Drug use: No    Sexual activity: Yes     Birth control/protection: None     Comment: uses protection sometimes      Review of Systems   Constitutional: Negative for activity change, chills, diaphoresis, fatigue, fever and unexpected weight change (weight gain ).   HENT: Negative for facial swelling and trouble swallowing.    Eyes: Negative for visual disturbance.   Respiratory: Negative for apnea, cough, choking, chest tightness, shortness of breath and wheezing.    Cardiovascular: Positive for leg swelling. Negative for chest pain and  palpitations.   Gastrointestinal: Positive for abdominal distention. Negative for abdominal pain, blood in stool, constipation, diarrhea, nausea and vomiting.   Endocrine: Negative for cold intolerance, heat intolerance and polyuria.   Genitourinary: Negative for dysuria, enuresis, frequency, hematuria and urgency.   Musculoskeletal: Negative for back pain and myalgias.   Skin: Negative for color change, pallor and wound.   Neurological: Negative for dizziness, tremors and weakness.   Hematological: Negative for adenopathy.   Psychiatric/Behavioral: Positive for confusion. Negative for agitation and behavioral problems.     Objective:     Vital Signs (Most Recent):  Temp: 98.1 °F (36.7 °C) (10/06/20 1106)  Pulse: 91 (10/06/20 1510)  Resp: (!) 30 (10/06/20 1111)  BP: (!) 90/44 (10/06/20 1111)  SpO2: 95 % (10/06/20 1111) Vital Signs (24h Range):  Temp:  [97.7 °F (36.5 °C)-99.1 °F (37.3 °C)] 98.1 °F (36.7 °C)  Pulse:  [] 91  Resp:  [14-30] 30  SpO2:  [84 %-99 %] 95 %  BP: ()/(44-83) 90/44   Weight: 134 kg (295 lb 6.7 oz)  Body mass index is 35.03 kg/m².      Intake/Output Summary (Last 24 hours) at 10/6/2020 1537  Last data filed at 10/5/2020 2000  Gross per 24 hour   Intake 480 ml   Output 810 ml   Net -330 ml       Physical Exam  Vitals signs and nursing note reviewed.   Constitutional:       Appearance: Normal appearance. He is obese. He is not ill-appearing, toxic-appearing or diaphoretic.   HENT:      Head: Normocephalic and atraumatic.      Mouth/Throat:      Mouth: Mucous membranes are moist.      Pharynx: No oropharyngeal exudate.   Eyes:      General: No scleral icterus.     Extraocular Movements: Extraocular movements intact.      Conjunctiva/sclera: Conjunctivae normal.      Pupils: Pupils are equal, round, and reactive to light.   Neck:      Musculoskeletal: Normal range of motion and neck supple.   Cardiovascular:      Rate and Rhythm: Normal rate and regular rhythm.      Pulses: Normal pulses.       Heart sounds: No murmur.   Pulmonary:      Breath sounds: Wheezing present.      Comments: Patient tachypneaic on 5L via nasal canula, with bilateral wheeze  Chest:      Chest wall: No tenderness.   Abdominal:      General: Abdomen is flat. Bowel sounds are normal. There is distension.      Tenderness: There is no abdominal tenderness. There is no guarding or rebound.   Lymphadenopathy:      Cervical: No cervical adenopathy.   Skin:     General: Skin is warm and dry.      Capillary Refill: Capillary refill takes less than 2 seconds.      Comments: Wrinkled, firm, and dark skin of lower extremities to the knees   Neurological:      General: No focal deficit present.      Mental Status: He is alert and oriented to person, place, and time.      Comments: Patient somnolent throughout exam, required frequent reorientation         Vents:  Oxygen Concentration (%): 28 (10/06/20 0350)  Lines/Drains/Airways     Peripheral Intravenous Line                 Peripheral IV - Single Lumen 10/03/20 1526 20 G Anterior;Left;Proximal Forearm 3 days              Significant Labs:    CBC/Anemia Profile:  Recent Labs   Lab 10/05/20  0340 10/06/20  0358   WBC 7.37 14.27*   HGB 12.1* 13.5*   HCT 41.8 45.1    226   MCV 79* 76*   RDW 16.0* 16.7*        Chemistries:  Recent Labs   Lab 10/05/20  0340 10/05/20  1322 10/06/20  0358    140 139   K 4.4 4.1 4.3   CL 94* 93* 94*   CO2 36* 35* 32*   BUN 27* 27* 29*   CREATININE 1.0 1.1 1.2   CALCIUM 9.3 9.6 9.3   ALBUMIN 2.6*  --  2.6*   PROT 7.7  --  8.2   BILITOT 1.1*  --  1.4*   ALKPHOS 86  --  98   ALT 15  --  16   AST 20  --  25   MG 2.1  --  2.0   PHOS 3.9  --  3.0       All pertinent labs within the past 24 hours have been reviewed.    Significant Imaging: I have reviewed and interpreted all pertinent imaging results/findings within the past 24 hours.

## 2020-10-06 NOTE — CONSULTS
Wound care consult received from nursing for assessment of penis. Patient is a 65-year-old man with HTN, suspected AMELIA, and combined systolic and diastolic hear failure s/p Bi-V ICD in February 2019 s/p explant in 6/17/2020 for lead infection who is admitted for device replacement, completed without initial complication on 9/28.     Upon assessment to penis noted resolved full thickness skin breakdown with new pink transparent epithelium that appears to have been a medical device related skin injury. No odor. No drainage. Patient is incontinent of urine and stool. Recommend nursing to clean penis with cleansing cloth, then apply 3M barrier film spray to affected area to protect new skin from excess moisture to prevent any new breakdown.     Nursing and MD team notified with recommendations.   Wound care to sign off. Please re-consult for any other concerns [phone]    Penis

## 2020-10-06 NOTE — ASSESSMENT & PLAN NOTE
Patient on carvedilol, ramipril, and aldactone at home. Switched to metoprolol succinate from carvedilol and entresto since admission. Aldactone held since admission with plan to consider restarting if blood pressure tolerates.    Plan:  - hold antihypertensives in the setting of sepsis

## 2020-10-06 NOTE — PROGRESS NOTES
[facility] Hospital Medicine  Progress Note    Patient Name: Papito Bhakta  MRN: [id number]  Patient Class: IP- Inpatient   Admission Date: 9/28/2020  Length of Stay: 6 days  Attending Physician: Silvia Katz MD  Primary Care Provider: Brynn To MD    [facility] Medicine Team: [facility] MEDICINE Patricia Mendiola MD    Subjective:     Principal Problem:Acute hypoxemic respiratory failure        HPI:  Mr. Bhakta is a 64 yo gentleman with PMH of NICM with AICD and recent R CRT-D placement 9/27, hypertension, HFpEF/HFrEF (EF 25% June 2020), hyperlipidemia, and obesity presenting for shortness of breath and hypoxia. On September 27 he underwent placement of cardiac resynchronization therapy pacemaker with Dr. Kwong. After procedure it was difficult to awaken patient. His pH was 7.26 and CO2 was elevated, and he was subsequently placed on BiPAP then weaned down to room air after diuresis. It was recommended he be admitted for volume overload in the setting of hypoxemia. He has been experiencing increased shortness of breath and lower extremity swelling for the past few months since his bi-V ICD was removed in June 2020 secondary to infection. He reports 2 pillow orthopnea, dyspnea ambulating from his room to the kitchen, and a 20# weight gain. Previously he was more active and able to do household chores and yard work. He has been compliant with his home medications and has been alternating between taking 80 mg of lasix once to twice daily though he was prescribed daily on discharge from the hospital in June. He denies chest pain, fevers, chills, weight loss, nausea, vomiting, diarrhea, melena/hematochezia, and dysuria.     ED/Post-Op Course:  On room air. CXR showed cardiomegaly, pulmonary edema, and pleural effusion. Received 60 mg IV lasix x2 with good urine output and improved respiratory status. CMP revealed increased CO2.     Overview/Hospital Course:  Admitted to  hospital medicine for management of acute hypoxemic respiratory failure likely secondary to acute on chronic combined HFpEF/HFrEF s/p Bi-V ICD placement. No further bipap requirements in hospital stay. CXR with cardiomegaly, cephalization, pleural effusions, and congestion. Repeat EF 18%, G3DD, PAP 50s. Diuresed and HTS following. Started on entresto, toprol, and continuing IV lasix.     Interval History:NAEO. AM vitals concerning with hypotension. AM meds were held. Patient became increasingly somnolent throughout the morning. Infectious work up with leukocytosis, lactic acidosis, and dirty UA. Patient had worsening encephalopathy throughout the day as well. MICU consulted when MAPS < 65. ABG was obtained and was WNL. Patient transferred to the MICU    Review of Systems   Constitutional: Positive for unexpected weight change (weight gain ). Negative for activity change, chills, diaphoresis, fatigue and fever.   HENT: Negative for facial swelling and trouble swallowing.    Eyes: Negative for visual disturbance.   Respiratory: Negative for apnea, cough, choking, chest tightness, shortness of breath and wheezing.    Cardiovascular: Positive for leg swelling. Negative for chest pain and palpitations.   Gastrointestinal: Positive for abdominal distention. Negative for abdominal pain, blood in stool, constipation, diarrhea, nausea and vomiting.   Endocrine: Negative for cold intolerance, heat intolerance and polyuria.   Genitourinary: Negative for dysuria and hematuria.   Musculoskeletal: Negative for back pain and myalgias.   Skin: Negative for color change, pallor and wound.   Neurological: Negative for dizziness, tremors and weakness.   Hematological: Negative for adenopathy.   Psychiatric/Behavioral: Positive for confusion. Negative for agitation and behavioral problems.     Objective:     Vital Signs (Most Recent):  Temp: 98.1 °F (36.7 °C) (10/06/20 1106)  Pulse: 92 (10/06/20 1547)  Resp: (!) 36 (10/06/20 1547)  BP:  (!) 90/44 (10/06/20 1111)  SpO2: 97 % (10/06/20 1547) Vital Signs (24h Range):  Temp:  [97.7 °F (36.5 °C)-99.1 °F (37.3 °C)] 98.1 °F (36.7 °C)  Pulse:  [] 92  Resp:  [14-36] 36  SpO2:  [84 %-99 %] 97 %  BP: ()/(44-81) 90/44     Weight: 134 kg (295 lb 6.7 oz)  Body mass index is 35.03 kg/m².    Intake/Output Summary (Last 24 hours) at 10/6/2020 1632  Last data filed at 10/5/2020 2000  Gross per 24 hour   Intake 240 ml   Output 400 ml   Net -160 ml      Physical Exam  Vitals signs and nursing note reviewed.   Constitutional:       Appearance: Normal appearance. He is obese. He is not ill-appearing, toxic-appearing or diaphoretic.   HENT:      Head: Normocephalic and atraumatic.      Mouth/Throat:      Mouth: Mucous membranes are moist.      Pharynx: No oropharyngeal exudate.   Eyes:      General: No scleral icterus.     Extraocular Movements: Extraocular movements intact.      Conjunctiva/sclera: Conjunctivae normal.      Pupils: Pupils are equal, round, and reactive to light.   Neck:      Musculoskeletal: Normal range of motion and neck supple.   Cardiovascular:      Rate and Rhythm: Normal rate and regular rhythm.      Pulses: Normal pulses.      Heart sounds: No murmur.   Pulmonary:      Comments: On room air  Appears more comfortable today  Talking in complete sentences, no accessory muscle use  Bilateral wheeze      Chest:      Chest wall: No tenderness.   Abdominal:      General: Abdomen is flat. Bowel sounds are normal. There is distension.      Tenderness: There is no abdominal tenderness. There is no guarding or rebound.   Musculoskeletal:      Comments: Swelling improved today  No pitting edema   Lymphadenopathy:      Cervical: No cervical adenopathy.   Skin:     General: Skin is warm and dry.      Capillary Refill: Capillary refill takes less than 2 seconds.   Neurological:      General: No focal deficit present.      Mental Status: He is alert and oriented to person, place, and time. Mental  status is at baseline.   Psychiatric:         Mood and Affect: Mood normal.         Significant Labs: All pertinent labs within the past 24 hours have been reviewed.    Significant Imaging: I have reviewed all pertinent imaging results/findings within the past 24 hours.      Assessment/Plan:      * Acute hypoxemic respiratory failure  Non-O2 dependent with need for BiPAP following bi-V ICD placement. Subsequently weaned to RA with diuresis. 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 CXR with pulmonary edema, pleural effusions, and cardiomegaly. Received 2 doses of 60 mg IV lasix s/p procedure.  DDX: Acute heart failure exacerbation , ACS (less likely), COPD (no history of this but patient does have smoking history), PNA (no fevers, chills)  - improving - appears more euvolemic on exam  - unable to obtain daily weights  - HTS consultation per EP recs, have signed off    Plan:  -   - lasix gtt 10/2, dc 10/3  - help PO meds 2/2 hypotension   - lopressor 25 BID, last dose 10/3  - toprol 50  - entresto 49-51  - hold aldactone, consider restarting once on PO lasix  - K>4, Mg >2 - replete as needed      Severe sepsis  Likely 2/2 skin breakdown on penis. Additionally patient with dirty U/A, leukocyotisis, lactic acidosis, hypotension and encephalopathy. S/p 500 cc bolus and Zosyn.    - continue care per micu for Sepsis     NICM (nonischemic cardiomyopathy)  Please see Acute Hypoxemic Respiratory Failure      Biventricular ICD (implantable cardioverter-defibrillator) in place  Placed 9/28 with Dr. Kwong.    - completed prophylactic keflex dose      Hyperlipidemia  Continue home pravastatin    Chronic combined systolic and diastolic congestive heart failure  History of combined systolic and diastolic dysfunction, s/p CRT-D 9/28 with continued volume overload. Has AICD. Compliant with home medications.    - Please see Acute Hypoxemic Respiratory  Failure      Essential hypertension  Home meds: carvedilol 12.5 BID, ramipiril, aldactone  - switched from carvedilol to lopressor 25 BID  - start toprol 50 10/4  - entresto 49-51  - hold aldactone, can consider restarting if blood pressure tolerates increased entresto dose and once pt is on PO diuresis        VTE Risk Mitigation (From admission, onward)         Ordered     heparin (porcine) injection 5,000 Units  Every 8 hours      10/06/20 1410     IP VTE HIGH RISK PATIENT  Once      09/29/20 1417     Place sequential compression device  Until discontinued      09/29/20 1417                Discharge Planning   MARIIA: 10/9/2020     Code Status: Full Code   Is the patient medically ready for discharge?: No    Reason for patient still in hospital (select all that apply): Treatment  Discharge Plan A: Home with family, Home Health                  Patricia Mendiola MD  Department of Hospital Medicine   [facility]

## 2020-10-06 NOTE — SUBJECTIVE & OBJECTIVE
Interval History:NAEO. AM vitals concerning with hypotension. AM meds were held. Patient became increasingly somnolent throughout the morning. Infectious work up with leukocytosis, lactic acidosis, and dirty UA. Patient had worsening encephalopathy throughout the day as well. MICU consulted when MAPS < 65. ABG was obtained and was WNL. Patient transferred to the MICU    Review of Systems   Constitutional: Positive for unexpected weight change (weight gain ). Negative for activity change, chills, diaphoresis, fatigue and fever.   HENT: Negative for facial swelling and trouble swallowing.    Eyes: Negative for visual disturbance.   Respiratory: Negative for apnea, cough, choking, chest tightness, shortness of breath and wheezing.    Cardiovascular: Positive for leg swelling. Negative for chest pain and palpitations.   Gastrointestinal: Positive for abdominal distention. Negative for abdominal pain, blood in stool, constipation, diarrhea, nausea and vomiting.   Endocrine: Negative for cold intolerance, heat intolerance and polyuria.   Genitourinary: Negative for dysuria and hematuria.   Musculoskeletal: Negative for back pain and myalgias.   Skin: Negative for color change, pallor and wound.   Neurological: Negative for dizziness, tremors and weakness.   Hematological: Negative for adenopathy.   Psychiatric/Behavioral: Positive for confusion. Negative for agitation and behavioral problems.     Objective:     Vital Signs (Most Recent):  Temp: 98.1 °F (36.7 °C) (10/06/20 1106)  Pulse: 92 (10/06/20 1547)  Resp: (!) 36 (10/06/20 1547)  BP: (!) 90/44 (10/06/20 1111)  SpO2: 97 % (10/06/20 1547) Vital Signs (24h Range):  Temp:  [97.7 °F (36.5 °C)-99.1 °F (37.3 °C)] 98.1 °F (36.7 °C)  Pulse:  [] 92  Resp:  [14-36] 36  SpO2:  [84 %-99 %] 97 %  BP: ()/(44-81) 90/44     Weight: 134 kg (295 lb 6.7 oz)  Body mass index is 35.03 kg/m².    Intake/Output Summary (Last 24 hours) at 10/6/2020 9595  Last data filed at  10/5/2020 2000  Gross per 24 hour   Intake 240 ml   Output 400 ml   Net -160 ml      Physical Exam  Vitals signs and nursing note reviewed.   Constitutional:       Appearance: Normal appearance. He is obese. He is not ill-appearing, toxic-appearing or diaphoretic.   HENT:      Head: Normocephalic and atraumatic.      Mouth/Throat:      Mouth: Mucous membranes are moist.      Pharynx: No oropharyngeal exudate.   Eyes:      General: No scleral icterus.     Extraocular Movements: Extraocular movements intact.      Conjunctiva/sclera: Conjunctivae normal.      Pupils: Pupils are equal, round, and reactive to light.   Neck:      Musculoskeletal: Normal range of motion and neck supple.   Cardiovascular:      Rate and Rhythm: Normal rate and regular rhythm.      Pulses: Normal pulses.      Heart sounds: No murmur.   Pulmonary:      Comments: On room air  Appears more comfortable today  Talking in complete sentences, no accessory muscle use  Bilateral wheeze      Chest:      Chest wall: No tenderness.   Abdominal:      General: Abdomen is flat. Bowel sounds are normal. There is distension.      Tenderness: There is no abdominal tenderness. There is no guarding or rebound.   Musculoskeletal:      Comments: Swelling improved today  No pitting edema   Lymphadenopathy:      Cervical: No cervical adenopathy.   Skin:     General: Skin is warm and dry.      Capillary Refill: Capillary refill takes less than 2 seconds.   Neurological:      General: No focal deficit present.      Mental Status: He is alert and oriented to person, place, and time. Mental status is at baseline.   Psychiatric:         Mood and Affect: Mood normal.         Significant Labs: All pertinent labs within the past 24 hours have been reviewed.    Significant Imaging: I have reviewed all pertinent imaging results/findings within the past 24 hours.

## 2020-10-06 NOTE — ASSESSMENT & PLAN NOTE
On 10/6 patient found to be more tachypneic and hypotensive, with waning mental status. He is febrile with a leukocytosis of 14 and lactate of 2.7 in the setting of urinalysis with 89 WBC and many bacteria. Patient with 90/55 mmHg, MAP 67 and given 250 ml NS boluses without improvement. Chest radiographs describe improving congestive heart failure without obvious evidence of infection but patient is requiring supplemental oxygen. Initial ABG, 7.45/50/80. Some evidence of penile skin breakdown. Currently has several second apneic/hypoventilatory events followed by tachypnea.     Plan:  - start vancomycin and piperacillin tazobactam  - levophed ordered, start if required to maintain MAP >65  - repeat ABG  - hold further IV fluids in the setting of heart failure  - trend lactate  - repeat COVID-19  - blood and urine cultures pending  - hold antihypertensives

## 2020-10-06 NOTE — ASSESSMENT & PLAN NOTE
Non-O2 dependent with need for BiPAP following bi-V ICD placement. Subsequently weaned to RA with diuresis. 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 CXR with pulmonary edema, pleural effusions, and cardiomegaly. Received 2 doses of 60 mg IV lasix s/p procedure.  DDX: Acute heart failure exacerbation , ACS (less likely), COPD (no history of this but patient does have smoking history), PNA (no fevers, chills)  - improving - appears more euvolemic on exam  - unable to obtain daily weights  - HTS consultation per EP recs, have signed off    Plan:  -   - lasix gtt 10/2, dc 10/3  - help PO meds 2/2 hypotension   - lopressor 25 BID, last dose 10/3  - toprol 50  - entresto 49-51  - hold aldactone, consider restarting once on PO lasix  - K>4, Mg >2 - replete as needed

## 2020-10-06 NOTE — ASSESSMENT & PLAN NOTE
Patient currently on 3L via nasal canula    Plan:  - see chronic combine systolic and diastolic congestive heart failure  - see severe sepsis

## 2020-10-06 NOTE — HOSPITAL COURSE
Patient was admitted on 9/29 for shortness of breath and hypoxia due to acute heart failure exacerbation, followed by heart transplant service. He was briefly placed on BIPAP, now room air. He has been diuresed, initially requiring a lasix drip, and is net -16L since admission. He was initally planned to transition to PO lasix 10/6 and discharge 10/7 but patient was found in the morning with tachypnea requiring supplemental oxygen and hypotension unresolved by fluid boluses. Additionally, patient has had worsening mental status during this time. Patient being admitted to critical care in the setting of likely sepsis and requiring vasopressors. Patient briefly required norepinephrine and is now on midodrine. His mental status has improved significantly overnight, now at baseline. Currently on 3L.

## 2020-10-06 NOTE — SUBJECTIVE & OBJECTIVE
Interval History: NAEON. HDS on 3 L nasal cannula with bipap use at night. Does admit to coughing up yellow sputum in the AM ongoing for multiple days. Denies chills, chest pain, pleuritic chest pain, dyspnea, or abdominal discomfort.     Review of Systems   Constitutional: Negative for activity change, chills, diaphoresis, fatigue, fever and unexpected weight change (weight gain ).   HENT: Negative for facial swelling and trouble swallowing.    Eyes: Negative for visual disturbance.   Respiratory: Positive for cough. Negative for apnea, choking, chest tightness, shortness of breath and wheezing.    Cardiovascular: Negative for chest pain, palpitations and leg swelling.   Gastrointestinal: Positive for abdominal distention. Negative for abdominal pain, blood in stool, constipation, diarrhea, nausea and vomiting.   Endocrine: Negative for cold intolerance, heat intolerance and polyuria.   Genitourinary: Negative for dysuria, enuresis, frequency, hematuria and urgency.   Musculoskeletal: Negative for back pain and myalgias.   Skin: Negative for color change, pallor and wound.   Neurological: Negative for dizziness, tremors and weakness.   Hematological: Negative for adenopathy.   Psychiatric/Behavioral: Negative for agitation, behavioral problems and confusion.     Objective:     Vital Signs (Most Recent):  Temp: (!) 101.9 °F (38.8 °C) (10/06/20 1718)  Pulse: 89 (10/06/20 1600)  Resp: (!) 44 (10/06/20 1600)  BP: (!) 95/46 (10/06/20 1600)  SpO2: (!) 93 % (10/06/20 1600) Vital Signs (24h Range):  Temp:  [97.7 °F (36.5 °C)-101.9 °F (38.8 °C)] 101.9 °F (38.8 °C)  Pulse:  [] 89  Resp:  [14-44] 44  SpO2:  [84 %-99 %] 93 %  BP: ()/(44-81) 95/46     Weight: 134 kg (295 lb 6.7 oz)  Body mass index is 35.03 kg/m².    Intake/Output Summary (Last 24 hours) at 10/6/2020 7302  Last data filed at 10/5/2020 2000  Gross per 24 hour   Intake 240 ml   Output --   Net 240 ml      Physical Exam  Vitals signs and nursing note  reviewed.   Constitutional:       Appearance: Normal appearance. He is obese. He is not ill-appearing, toxic-appearing or diaphoretic.   HENT:      Head: Normocephalic and atraumatic.      Mouth/Throat:      Mouth: Mucous membranes are moist.      Pharynx: No oropharyngeal exudate.   Eyes:      General: No scleral icterus.     Extraocular Movements: Extraocular movements intact.      Conjunctiva/sclera: Conjunctivae normal.      Pupils: Pupils are equal, round, and reactive to light.   Neck:      Musculoskeletal: Normal range of motion and neck supple.   Cardiovascular:      Rate and Rhythm: Normal rate and regular rhythm.      Pulses: Normal pulses.      Heart sounds: No murmur.   Pulmonary:      Breath sounds: No wheezing.      Comments: Patient currently on 3L  Course breath sounds right lung base  No TTP chest wall  Chest:      Chest wall: No tenderness.   Abdominal:      General: Abdomen is flat. Bowel sounds are normal. There is distension.      Tenderness: There is no abdominal tenderness. There is no guarding or rebound.   Lymphadenopathy:      Cervical: No cervical adenopathy.   Skin:     General: Skin is warm and dry.      Capillary Refill: Capillary refill takes less than 2 seconds.      Comments: Wrinkled, firm, and dark skin of lower extremities to the knees   Neurological:      General: No focal deficit present.      Mental Status: He is alert and oriented to person, place, and time.      Comments: Patient at baseline mental status per daughter         Significant Labs: All pertinent labs within the past 24 hours have been reviewed.    Significant Imaging: I have reviewed all pertinent imaging results/findings within the past 24 hours.

## 2020-10-06 NOTE — HPI
Paipto Bhakta is a 65 year old man with non-ischemic cardiomyopathy with AICD and recent cardiac resynchronization therapy with defibrillation 9/27, mixed diastolic and systolic heart failure (EF 18% with grade III diastolic dysfunction 9/30), hyperlipidemia, hypertension who is being admitted to critical care for worsening hypotension. Patient was admitted on 9/29 for shortness of breath and hypoxia due to acute heart failure exacerbation, was followed by heart transplant service. He was briefly placed on BIPAP, now room air. He has been diuresed, initially requiring a lasix drip, and is net -16L since admission. He was planned to transition to PO lasix today and discharge tomorrow.    Patient was found in the morning with tachypnea and accessory muscle use with blood pressure of 90/55 mmHg, MAP 67, and oxygen saturation of 87%. He was placed on 4L oxygen initially, now 3L. He has had worsening mental status. Initially patient, found to have delayed answers now more somnolent. He was given 2x 250mL NS bolus. Urinalysis relevant for many bacteria and 89 WBC. His lactate was 2.7. He now has leukocytosis of 14.2 from 7.3. He was started on piperacillin tazobactam. No history of resistant urinary tract infections. He was found to have penile skin breakdown, per wound care notes. Patient being admitted to critical care in the setting of likely sepsis and requiring vasopressors.

## 2020-10-06 NOTE — PROGRESS NOTES
Pharmacokinetic Initial Assessment: IV Vancomycin    Assessment/Plan:    · Initiate intravenous Vancomycin with loading dose of 2500 mg once on 10/6 at 1830  · Maintenance dose of Vancomycin 1750 mg IV every 12 hours to follow  · Desired empiric serum trough concentration is 15 to 20 mcg/mL   · Draw vancomycin trough level 1 hour prior to fourth dose on 10/8 at 0530  Pharmacy will continue to follow and monitor vancomycin.      Please contact pharmacy at extension [phone] with any questions regarding this assessment.     Thank you for the consult,   Ernesto Tapia       Patient brief summary:  Papito Bhakta is a 65 y.o. male initiated on antimicrobial therapy with IV Vancomycin for treatment of suspected urinary tract infection    Drug Allergies:   Review of patient's allergies indicates:  No Known Allergies    Actual Body Weight:   134 kg    Renal Function:   Estimated Creatinine Clearance: 93 mL/min (based on SCr of 1.2 mg/dL).,     Dialysis Method (if applicable):  N/A    CBC (last 72 hours):  Recent Labs   Lab Result Units 10/04/20  0434 10/05/20  0340 10/06/20  0358   WBC K/uL 6.95 7.37 14.27*   Hemoglobin g/dL 11.9* 12.1* 13.5*   Hematocrit % 41.9 41.8 45.1   Platelets K/uL 229 233 226   Gran% % 57.4 59.6 77.7*   Lymph% % 24.6 23.5 11.2*   Mono% % 12.5 12.5 10.2   Eosinophil% % 4.3 3.4 0.1   Basophil% % 0.9 0.7 0.4   Differential Method  Automated Automated Automated       Metabolic Panel (last 72 hours):  Recent Labs   Lab Result Units 10/04/20  0434 10/05/20  0340 10/05/20  1322 10/06/20  0358 10/06/20  1246   Sodium mmol/L 144 141 140 139  --    Potassium mmol/L 4.4 4.4 4.1 4.3  --    Chloride mmol/L 93* 94* 93* 94*  --    CO2 mmol/L 42* 36* 35* 32*  --    Glucose mg/dL 95 90 93 102  --    Glucose, UA   --   --   --   --  Negative   BUN, Bld mg/dL 20 27* 27* 29*  --    Creatinine mg/dL 0.9 1.0 1.1 1.2  --    Albumin g/dL 2.5* 2.6*  --  2.6*  --    Total Bilirubin mg/dL 1.1* 1.1*  --  1.4*  --    Alkaline  Phosphatase U/L 83 86  --  98  --    AST U/L 19 20  --  25  --    ALT U/L 14 15  --  16  --    Magnesium mg/dL 2.1 2.1  --  2.0  --    Phosphorus mg/dL 3.3 3.9  --  3.0  --        Drug levels (last 3 results):  No results for input(s): VANCOMYCINRA, VANCOMYCINPE, VANCOMYCINTR in the last 72 hours.    Microbiologic Results:  Microbiology Results (last 7 days)     Procedure Component Value Units Date/Time    Urine culture [[id number]] Collected: 10/06/20 1246    Order Status: No result Specimen: Urine Updated: 10/06/20 1323    Blood culture [[id number]] Collected: 10/06/20 1143    Order Status: Sent Specimen: Blood Updated: 10/06/20 1208    Blood culture [[id number]] Collected: 10/06/20 1143    Order Status: Sent Specimen: Blood Updated: 10/06/20 1200

## 2020-10-06 NOTE — PT/OT/SLP PROGRESS
Physical Therapy Treatment    Patient Name:  Papito Bhakta   MRN:  [id number]    Recommendations:     Discharge Recommendations:  nursing facility, skilled   Discharge Equipment Recommendations: (TBD)   Barriers to discharge: Decreased caregiver support at current functional level     Assessment:     Papito Bhakta is a 65 y.o. male admitted with a medical diagnosis of Acute hypoxemic respiratory failure.  He presents with the following impairments/functional limitations: weakness, impaired balance, decreased safety awareness, impaired endurance, impaired cognition, pain, impaired cardiopulmonary response to activity, impaired self care skills, impaired functional mobilty, gait instability, decreased lower extremity function, decreased coordination. Patient presented with fatigue/lethargy, being less expressive and with delayed responses to act this date. Pt required increased time to answer questions and follow commands this session. He continues to require increased cues to maintain pacemaker precautions and max-total assist to complete bed mobility. Towards end of session pt became increasingly lethargic with decreased responsiveness requiring return to supine. RN aware and monitoring. Patient would benefit from acute skilled PT services to address current deficits and increase functional mobility.     Rehab Prognosis: Good; patient would benefit from acute skilled PT services to address these deficits and reach maximum level of function.    Recent Surgery: Procedure(s) (LRB):  INSERTION, ICD, BIVENTRICULAR (N/A) 8 Days Post-Op    Plan:     During this hospitalization, patient to be seen 4 x/week to address the identified rehab impairments via gait training, therapeutic activities, therapeutic exercises, neuromuscular re-education and progress toward the following goals:    · Plan of Care Expires:  10/27/20    Subjective     Chief Complaint: none noted  Patient/Family Comments/goals: Pt presented with increased  fatigue and was less verbal this date.  Pain/Comfort:  · Pain Rating 1: 0/10       Objective:     Communicated with nurse prior to session.  Patient found supine with telemetry upon PT entry to room.     General Precautions: Standard, fall, pacemaker   Orthopedic Precautions:N/A   Braces: Sling and swathe     Functional Mobility:  · Bed Mobility:     · Rolling Right: maximal assistance for trunk and LE support with HOB elevated and use of siderail  · Maintained position to allow for toileting hygiene   · Cued pt for technique, sequencing and pacemaker precautions   · Rolling Left: maximal assistance for trunk and LE support with HOB elevated and use of siderail  · Scooting:   · Seated EOB: total assistance for hip movement and cues for weight shifting   · Supine towards HOB using draw sheet: total assistance and of 2 persons   · Supine to Sit: maximal assistance and of 2 persons for trunk elevation and LE placement with HOB elevated and use of siderail  · Sit to Supine: total assistance and of 2 persons for trunk and LE placement and midline positioning with HOB flat   · Balance: ~15 minutes seated at EOB with CGA-modA   · Pt demo'd increased posterior lean and thoracic kyphosis requiring intermittent assistance to prevent posterior LOB   · Cues provided for anterior trunk lean and weight shift, upright posture, and to prevent use of RUE for pacemaker precautions   · Pt SpO2 92-94%, HR  during seated balance tasks. Pt demo'd increased lethargy, increased WOB and became less responsive to questions so RN called to bedside. Attempted to obtain BP reading while pt seated EOB but BP machine unable to produce a reading. Per RN brief decrease in SpO2 to 78%. Returned pt to supine, and RN placed pt on 4L O2. At end of session pt SpO2 98% on 4L O2 and able to obtain BP reading in supine: BP 89/52. Left pt supine with RN present.       AM-PAC 6 CLICK MOBILITY  Turning over in bed (including adjusting bedclothes, sheets  and blankets)?: 2  Sitting down on and standing up from a chair with arms (e.g., wheelchair, bedside commode, etc.): 2  Moving from lying on back to sitting on the side of the bed?: 2  Moving to and from a bed to a chair (including a wheelchair)?: 2  Need to walk in hospital room?: 1  Climbing 3-5 steps with a railing?: 1  Basic Mobility Total Score: 10       Therapeutic Activities and Exercises:  Pt educated on role of PT and PT POC. Pt verbalized understanding.   Reviewed RUE pacemaker precautions. Pt required cues and intermittent assist throughout to maintain precautions.   Provided verbal and tactile cues for postural corrections throughout seated balance while pt completed self-care tasks with OT.   Pt oriented to call bell and instructed to call for staff assist with standing tasks/transfers. Pt verbalized understanding.     Patient left HOB elevated with all lines intact, call button in reach and nurse present.    GOALS:   Multidisciplinary Problems     Physical Therapy Goals        Problem: Physical Therapy Goal    Goal Priority Disciplines Outcome Goal Variances Interventions   Physical Therapy Goal     PT, PT/OT Ongoing, Progressing     Description: Goals to be met by: 10/13/20     Patient will increase functional independence with mobility by performin. Supine to sit with Moderate Assistance.  2. Sit to supine with Moderate Assistance.  3. Sit to stand transfer with Moderate Assistance, with ankit-walker PRN, maintaining Pacemaker precautions.  4. Bed to chair transfer with Moderate Assistance, with ankit-walker PRN, maintaining Pacemaker precautions.  5. Gait  x 50 feet with Moderate Assistance, with ankit-walker PRN, maintaining Pacemaker precautions.   6. Ascend/Descend 6 inch curb step with Moderate Assistance, with ankit-walker PRN, maintaining Pacemaker precautions.  7. Lower extremity exercise program x 20 reps per handout, with assistance as needed.                     Time Tracking:     PT  Received On: 10/06/20  PT Start Time: 1023     PT Stop Time: 1050  PT Total Time (min): 27 min     Billable Minutes: Therapeutic Activity 12 and Neuromuscular Re-education 15  Co-treat with OT.    Treatment Type: Treatment  PT/PTA: PT     PTA Visit Number: 0     Sridhar Armaniflavio, SPT  10/06/2020

## 2020-10-06 NOTE — PLAN OF CARE
10/06/20 1404   Post-Acute Status   Post-Acute Authorization Home Health   Home Health Status Awaiting Internal Medical Clearance     SW informed by treatment team that pt is refusing SNF and will be discharged tomorrow with HH.  SW sent referral to [facility] per pt's choice but they declined stating that they do not accept pt's [employer] plan.  SW then informed by treatment team that discharge date has been pushed back due to pt becoming septic.  SW also informed treatment team that (per therapists Elaine and Megan) therapy does not believe that pt is able to go home safely.  SW will continue to follow.    Juliana Barfield LMSW  [facility]  [phone]

## 2020-10-06 NOTE — PLAN OF CARE
Pt free of falls/trauma/injuries.  Denies c/o SOB, CP, or discomfort.  Generalized skin remains CDI except for a issue to the foreskin on penis,  moderate edema noted.  Pt being diuresed with Lasix IVP diuresing well.Pt continues with bipap Qhs.  Pt tolerating plan of care.

## 2020-10-06 NOTE — CONSULTS
Consult received and acknowledged by Critical Care Medicine.     Full Consult note to follow    Alanna Lyons MD  Internal Medicine PGY2

## 2020-10-06 NOTE — MEDICAL/APP STUDENT
[facility]  Progress Note    Patient Name: Papito Bhakta  MRN: [id number]  Patient Class: IP- Inpatient   Admission Date: 9/28/2020  Length of Stay: 7 days  Attending Physician: Skye Inman MD  Primary Care Provider: Brynn To MD    Active Diagnoses:    Diagnosis Date Noted POA    PRINCIPAL PROBLEM:  Acute hypoxemic respiratory failure [J96.01] 01/05/2019 Yes    Acute on chronic combined systolic and diastolic heart failure [I50.43] 09/30/2020 Yes    NICM (nonischemic cardiomyopathy) [I42.8] 06/16/2020 Yes     Chronic    Biventricular ICD (implantable cardioverter-defibrillator) in place [Z95.810] 05/14/2019 Yes    Hyperlipidemia [E78.5] 01/05/2019 Yes     Chronic    Chronic combined systolic and diastolic congestive heart failure [I50.42] 12/01/2017 Yes    Essential hypertension [I10] 12/01/2017 Yes     Chronic      Problems Resolved During this Admission:           Subjective:           HPI:  Mr. Bhakta is a 66 yo gentleman with PMH HTN, HLD, Obesity and Combined systolic/diastolic HF s/p BiVICD placement in February 2019, subsequently with lead infection and explant 6/17/20. Patient had ICD replacement 9/28/20 here w/o initial complication. Following the procedure, the patient was noted to be difficult to arouse, acidotic with increased CO2. BiPap was started and patient eventually weaned off onto room air following diuresis with 60 mg Lasix x2. He was then admitted for volume overload in setting of hypoxemia likely secondary to acute on chronic combined HF s/p BiVICD placement. Pt reportedly had been complaining of increased SOB with LE edema for past several months since ICD removal in June 2020. Endorses orthopnea with use of 2 pillows at night, KING when ambulating to the kitchen and recent 20 lb weight gain. Prior to this, he was more active and completed daily chores/yard work. Reports compliance with home meds including Lasix 80, which he sometimes has been taking BID as  "opposed to qd as prescribed. Denies fever, chills, N/V, diarrhea, melena/hematochezia or dysuria. No further complaints or concerns at this time.         "ED/Post-Op Course:  On room air. CXR showed cardiomegaly, pulmonary edema, and pleural effusion. Received 60 mg IV lasix x2 with good urine output and improved respiratory status. CMP revealed increased CO2."     "Overview/Hospital Course:  Admitted to hospital medicine for management of acute hypoxemic respiratory failure likely secondary to acute on chronic combined HFpEF/HFrEF s/p Bi-V ICD placement. No further bipap requirements in hospital stay. CXR with cardiomegaly, cephalization, pleural effusions, and congestion. Repeat EF 18%, G3DD, PAP 50s. Diuresed and HTS following. Started on entresto, toprol, and continuing IV lasix. Remains stable on low flow nc."    Interval History: No acute events overnight. Pt on BiPap to sleep but otherwise has no further oxygen requirements. Remains off of nasal cannula.  UO -1.8 L in last 24 hours, 16.1 L net negative fluid loss since admission. Still no BM since admission. Has attempted to pass BM but only able to pass air. Denies any abdominal pain. Also denies any fever, chills, CP, SOB, N/V, dizziness/light headedness. Feels about the same as yesterday.     Further Interval:   After my initial evaluation this am, patient had episode of transient tachycardia, tachypnea with accessory muscle use following progressive hypotension this morning. Evaluated at bedside. Pt became stable, O2 sats normal at 95/96%, now breathing comfortably and eating lunch. Pt remains afebrile. Speaking in full sentences. With this episode and patient's new elevated WBC at 14, will plan for septic workup including CXR to evaluate for PE/PNA, urinalysis and complete blood cultures.     Review of Systems   Constitutional: Positive for unexpected weight change (weight gain ). Negative for activity change, chills, diaphoresis, fatigue and fever. "   HENT: Negative for facial swelling and trouble swallowing.    Eyes: Negative for visual disturbance.   Respiratory: Negative for apnea, cough, choking, chest tightness, shortness of breath and wheezing.    Cardiovascular: Positive for leg swelling(up to just superior to knee bilaterally). Negative for chest pain and palpitations.   Gastrointestinal: Positive for abdominal distention. Negative for abdominal pain, blood in stool, diarrhea, nausea and vomiting. Has not passed BM during admission.   Endocrine: Negative for cold intolerance, heat intolerance and polyuria.   Genitourinary: Negative for dysuria and hematuria.   Musculoskeletal: Negative for back pain and myalgias.   Skin: No rash  Neurological: Negative for dizziness, tremors and weakness.   Hematological: Negative for adenopathy.   Psychiatric/Behavioral: Negative for agitation, behavioral problems and confusion.            Vitals:    10/06/20 1111   BP: (!) 90/44   Pulse: 96   Resp: (!) 30   Temp:        Net Urinary output last 24 hours: Negative 1,840 mL  Net Loss during admission: Negative 16.1 L         Physical Exam  Vitals signs and nursing note reviewed.   Constitutional:       Appearance: Normal appearance. He is obese. He is not ill-appearing, toxic-appearing or diaphoretic.   HENT:      Head: Normocephalic and atraumatic.      Mouth/Throat:      Mouth: Mucous membranes are moist.      Pharynx: No oropharyngeal exudate.   Eyes:      General: No scleral icterus.     Extraocular Movements: Extraocular movements intact.      Conjunctiva/sclera: Conjunctivae normal.      Pupils: Pupils are equal, round, and reactive to light.   Neck:      Musculoskeletal: Normal range of motion and neck supple.   Cardiovascular:      Rate and Rhythm: Normal rate and regular rhythm.      Pulses: Normal pulses.      Heart sounds: No murmur.   Pulmonary:      Comments: On room air  Appears comfortable  Talking in complete sentences, no accessory muscle use  Course  bilateral breath sounds with bilateral expiratory wheeze. Somewhat improved from yesterday  Chest:      Chest wall: No tenderness.   Abdominal:      General: Abdomen is flat. Bowel sounds are normal. There is distension.      Tenderness: There is no abdominal tenderness. There is no guarding or rebound.   Musculoskeletal:      Right lower leg: Edema present.      Left lower leg: Edema present.      Comments: Swelling remains improved today and is currently at patient's baseline to bilateral LEs.   Lymphadenopathy:      Cervical: No cervical adenopathy.   Skin:     General: Skin is warm and dry.      Capillary Refill: Capillary refill takes less than 2 seconds. Scaling, wrinkling and dryness to bilateral LE's consistent with stasis dermatitis.  Neurological:      General: No focal deficit present.      Mental Status: He is alert and oriented to person, place, and time. Mental status is at baseline.   Psychiatric:         Mood and Affect: Mood normal.            Significant Labs: All pertinent labs within the past 24 hours have been reviewed.     Significant Imaging: I have reviewed all pertinent imaging results/findings within the past 24 hours.              Assessment/Plan:      1. Acute Hypoxic Respiratory Failure secondary to Acute on Chronic Combined Heart Failure Exacerbation  - Transitioned to PO Lasix 80 mg BID, first dose at 9 am this morning  -Metoprolol succinate 50 mg qd per HTS recs.  -Entresto 49-51 mg BID  -Started Aldactone 25 mg qd, first dose 9 am today.   -Electrolytes: K>4; Mg>2, replenish prn      2. HLD  -continue home Pravastatin 80 mg qd    3. Essential HTN  -Metoprolol succinate 50 mg  -Entresto 49-51 mg  -Started Aldactone this morning  -continue to monitor BP    4. S/p Bi Ventricular ICD replacement 9/28/20  -Given Keflex prophylaxis    5. Leukocytosis of 14.27  -with hypotension down to 80s/50s this am  -transient episode of tachypnea, tachycardia, O2 sats remain stable, remains  afebrile  -Plan for full septic workup: CXR to r/o PNA/PE, Urinalysis, Complete blood cultures  -Hold BP medications except for Metoprolol at this time.           VTE Risk Mitigation (From admission, onward)         Ordered     heparin (porcine) injection 7,500 Units  Every 8 hours      09/29/20 1417     IP VTE HIGH RISK PATIENT  Once      09/29/20 1417     Place sequential compression device  Until discontinued      09/29/20 1417                   Randall Diaz  [facility]

## 2020-10-06 NOTE — NURSING
REPORT:  EEG.      INDICATION:  Study is indicated to evaluate a 77-year-old woman after status   epilepticus.      MEDICATIONS:  Include phenobarbital, levetiracetam, and lacosamide.      DESCRIPTION:  The posterior predominant rhythm in the waking record was not   seen as it had no clear periods of wakefulness.  The EKG rhythm strip showed a   regular rate and rhythm.  Hyperventilation could not be performed.  Photic   stimulation was performed and showed nothing significant.      Throughout the record, there were frequent right temporal epileptiform sharp   waves.  These did not organize into electrographic seizures.  There were no   other focal epileptiform discharges.  There were no bilateral spike and wave   discharges.      There were also frequent generalized slow waves of 2-4 per second.      No clear sleep architecture was noted.      IMPRESSION:  Abnormal study.  The frequent occurrence of right temporal   epileptiform sharp waves constitutes an actively discharging focus.  No   electrographic seizures, however, were noted.      There was no evidence of nonconvulsive status epilepticus.      Thank you for the opportunity to participate in this patient's care.  Please   do not hesitate to contact me with any further questions.                  MD KEYON Limon/Aure     DD:  06/20/2017 13:02:07 / DT:  06/20/2017 14:14:51     Job #:   [id number]         RN staff called to bedside by PT/OT. Patient found sitting on edge of bed demonstrating tachypnea and abdominal muscle use during breathing. BP 90/55 MAP 67. Patient placed on 4L of oxygen d/t O2 saturation of 87%. Patient is delayed in responses to orientation questions. Dr. Mendiola called to bedside and notified. Verbal orders taken to administer 250mL NS bolus and to obtain U/A.

## 2020-10-06 NOTE — ASSESSMENT & PLAN NOTE
Likely 2/2 skin breakdown on penis. Additionally patient with dirty U/A, leukocyotisis, lactic acidosis, hypotension and encephalopathy. S/p 500 cc bolus and Zosyn.    - continue care per micu for Sepsis

## 2020-10-06 NOTE — ASSESSMENT & PLAN NOTE
2/2 acute complicated cystitis speciated to serratia. White count resolved.     - Day 2 of cefepime  - will dc on PO abx

## 2020-10-06 NOTE — PT/OT/SLP PROGRESS
Occupational Therapy   Treatment    Name: Papito Bhakta  MRN: [id number]  Admitting Diagnosis:  Acute hypoxemic respiratory failure  8 Days Post-Op    Recommendations:     Discharge Recommendations: nursing facility, skilled  Discharge Equipment Recommendations:  (TBD)  Barriers to discharge:  (requiring increased level of assist with ADLs and mobility)    Assessment:     Papito Bhakta is a 65 y.o. male with a medical diagnosis of Acute hypoxemic respiratory failure.  He presents with the following performance deficits affecting function: weakness, impaired endurance, impaired self care skills, impaired functional mobilty, gait instability, impaired balance, impaired cognition, decreased coordination, decreased upper extremity function, impaired cardiopulmonary response to activity, pain. Patient agreeable to therapy session and EOB activity. While sitting EOB, patient demonstrated flat affect, lethargy, and delayed response to questions, which is different from previous sessions. Patient's SPO2 92-94% on room air initially when sitting EOB. RN notified of symptoms patient displaying and came to assess patient at this time. RN got a brief reading of 78% and placed patient on 4L. Patient returned to bed and BP 89/52 and SPO2 98% on 4L.     At this time, patient will continue to benefit from acute skilled therapy intervention to address deficits/underlying impairments and progress towards prior level of function. After discharge, patient would benefit from continued skilled therapy intervention at SNF to progress more towards independence in ADLs and functional mobility before going home.    Rehab Prognosis:  Good; patient would benefit from acute skilled OT services to address these deficits and reach maximum level of function.       Plan:     Patient to be seen 4 x/week to address the above listed problems via self-care/home management, therapeutic activities, therapeutic exercises  · Plan of Care Expires:  "11/29/20  · Plan of Care Reviewed with: patient    Subjective     Patient stated "that's it" when therapist adjusted hips in bed at end of session.    Pain/Comfort:  · Pain Rating 1: (Did not c/o pain)    Objective:     Communicated with: RN prior to session. Patient found HOB elevated with telemetry upon OT entry to room. PT and PT student present for co-tx as appropriate for patient 2* decreased activity and level of skilled assist needed for task completion.    General Precautions: Standard, fall, pacemaker   Orthopedic Precautions:N/A   Braces: Sling and swathe (at night)     Occupational Performance:     Bed Mobility:    · Patient completed Rolling/Turning to Left with maximal assistance  · Patient completed Rolling/Turning to Right with maximal assistance  · Patient completed Supine to Sit with maximal assistance  · Patient completed Scooting hips to place B feet on floor when sitting EOB with maximal assistance  · Patient completed Sit to Supine with total assistance and 2 persons   · Patient completed Scooting/briding towards HOB via draw sheet with total assist of 4 people at end of session    Functional Mobility/Transfers:  · Not assessed this session; patient returned to supine to assess vitals    Balance:  · Sitting: patient tolerated sitting EOB ~15 min with brief periods of CGA progressing to mod assist  · Patient presented with posterior lean this session requiring max verbal and tactile cues for anterior weight shifting and upright posture  · Standing: not assessed    Activities of Daily Living:  · Grooming: minimum assistance sitting EOB to complete oral hygiene  · Lower Body Dressing: total assistance to adjust socks sitting EOB  · Toileting: total assistance for hygiene in bed via rolling    AMPAC 6 Click ADL: 14    Treatment & Education:   Reviewed pacemaker precautions at start of session.   Therapist provided facilitation and instruction of proper body mechanics and fall prevention strategies " during tasks listed above.   Instructed patient to use call light to have nursing staff assist with needs/transfers.   Discussed OT POC and answered all questions within OT scope of practice.   Whiteboard updated     Patient left HOB elevated with all lines intact, call button in reach and RN presentEducation:      GOALS:   Multidisciplinary Problems     Occupational Therapy Goals        Problem: Occupational Therapy Goal    Goal Priority Disciplines Outcome Interventions   Occupational Therapy Goal     OT, PT/OT Ongoing, Progressing    Description: Goals to be met by: 10/14/2020    Patient will increase functional independence with ADLs by performing:    UE Dressing with Supervision.  LE Dressing with Minimal Assistance.  Grooming while standing with Contact Guard Assistance.  Supine to sit with Minimal Assistance.  Toilet transfer to toilet with Minimal Assistance.                     Time Tracking:     OT Date of Treatment: 10/06/20  OT Start Time: 1025  OT Stop Time: 1050  OT Total Time (min): 25 min    Billable Minutes:Self Care/Home Management 15  Therapeutic Activity 10    Elaine Zamudio OT  10/6/2020

## 2020-10-06 NOTE — PLAN OF CARE
10/06/20 0804   Discharge Reassessment   Assessment Type Discharge Planning Reassessment   Do you have any problems affording any of your prescribed medications? TBD   Discharge Plan A Home with family;Home Health   Discharge Plan B Skilled Nursing Facility   Thus far, the pt has refused SNF which is recommended by PT & OT.

## 2020-10-06 NOTE — ASSESSMENT & PLAN NOTE
Patient was initially admitted to hospital medicine on 9/29 for shortness of breath and hypoxia due to acute heart failure exacerbation. Patient with ejection fraction of 18% and grade III diastolic dysfunction on 9/29 ECHO. He was briefly placed on BIPAP, now room air. He has been diuresed, initially requiring a lasix drip, and is net -16L since admission. Initially, he was planned to transition to 80 mg BID PO lasix 10/6 and discharge 10/7.    Current decompensation could be related to underlying cardiogenic shock but likely sepsis.    Plan:  - hold lasix today, plan to restart tomorrow if pressures tolerate  - hold antihypertensives and goal directed therapy in the setting of potential sepsis

## 2020-10-06 NOTE — H&P
[facility]  Critical Care Medicine  History & Physical    Patient Name: Papito Bhakta  MRN: [id number]  Admission Date: 9/28/2020  Hospital Length of Stay: 6 days  Code Status: Full Code  Attending Physician: Silvia Katz MD   Primary Care Provider: Brynn To MD   Principal Problem: Acute hypoxemic respiratory failure    Subjective:     HPI:  Papito Bhakta is a 65 year old man with non-ischemic cardiomyopathy with AICD and recent cardiac resynchronization therapy with defibrillation 9/27, mixed diastolic and systolic heart failure (EF 18% with grade III diastolic dysfunction 9/30), hyperlipidemia, hypertension who is being admitted to critical care for worsening hypotension. Patient was admitted on 9/29 for shortness of breath and hypoxia due to acute heart failure exacerbation, was followed by heart transplant service. He was briefly placed on BIPAP, now room air. He has been diuresed, initially requiring a lasix drip, and is net -16L since admission. He was planned to transition to PO lasix today and discharge tomorrow.    Patient was found in the morning with tachypnea and accessory muscle use with blood pressure of 90/55 mmHg, MAP 67, and oxygen saturation of 87%. He was placed on 4L oxygen initially, now 3L. He has had worsening mental status. Initially patient, found to have delayed answers now more somnolent. He was given 2x 250mL NS bolus. Urinalysis relevant for many bacteria and 89 WBC. His lactate was 2.7. He now has leukocytosis of 14.2 from 7.3. He was started on piperacillin tazobactam. No history of resistant urinary tract infections. He was found to have penile skin breakdown, per wound care notes. Patient being admitted to critical care in the setting of likely sepsis and requiring vasopressors.     Hospital/ICU Course:  Patient was admitted on 9/29 for shortness of breath and hypoxia due to acute heart failure exacerbation, followed by heart transplant service. He  was briefly placed on BIPAP, now room air. He has been diuresed, initially requiring a lasix drip, and is net -16L since admission. He was initally planned to transition to PO lasix 10/6 and discharge 10/7 but patient was found in the morning with tachypnea requiring supplemental oxygen and hypotension unresolved by fluid boluses. Additionally, patient has had worsening mental status during this time. Patient being admitted to critical care in the setting of likely sepsis and requiring vasopressors.      Past Medical History:   Diagnosis Date    Anticoagulant long-term use     Aspirin    CHF (congestive heart failure)     Hyperlipidemia     Hypertension     NICM (nonischemic cardiomyopathy) 2020    Obesity        Past Surgical History:   Procedure Laterality Date    INSERTION OF BIVENTRICULAR IMPLANTABLE CARDIOVERTER-DEFIBRILLATOR (ICD) N/A 2019    Procedure: INSERTION, ICD, BIVENTRICULAR;  Surgeon: Shailesh Eng MD;  Location: [facility] CATH LAB;  Service: Cardiology;  Laterality: N/A;  RN PRE OP 2-19  1ST CASE PER  RADHA. NOTIFIED RADHA THAT ANESTHESIA IS NOT PITO FOR 1ST CASE START-LO    INSERTION OF BIVENTRICULAR IMPLANTABLE CARDIOVERTER-DEFIBRILLATOR (ICD) N/A 2020    Procedure: INSERTION, ICD, BIVENTRICULAR;  Surgeon: Jim Kwong MD;  Location: [facility] EP LAB;  Service: Cardiology;  Laterality: N/A;  NICM, CRT-D, SJM,, MAC, DM, 3 Prep*Wearing LifeVest*    oral extraction  2018    TESTICLE SURGERY         Review of patient's allergies indicates:  No Known Allergies    Family History     Problem Relation (Age of Onset)    Epilepsy Mother        Tobacco Use    Smoking status: Former Smoker     Packs/day: 0.50     Years: 40.00     Pack years: 20.00     Types: Cigarettes, Cigars     Quit date:      Years since quittin.7    Smokeless tobacco: Never Used   Substance and Sexual Activity    Alcohol use: Yes     Comment: once a month    Drug use: No    Sexual activity: Yes      Birth control/protection: None     Comment: uses protection sometimes      Review of Systems   Constitutional: Negative for activity change, chills, diaphoresis, fatigue, fever and unexpected weight change (weight gain ).   HENT: Negative for facial swelling and trouble swallowing.    Eyes: Negative for visual disturbance.   Respiratory: Negative for apnea, cough, choking, chest tightness, shortness of breath and wheezing.    Cardiovascular: Positive for leg swelling. Negative for chest pain and palpitations.   Gastrointestinal: Positive for abdominal distention. Negative for abdominal pain, blood in stool, constipation, diarrhea, nausea and vomiting.   Endocrine: Negative for cold intolerance, heat intolerance and polyuria.   Genitourinary: Negative for dysuria, enuresis, frequency, hematuria and urgency.   Musculoskeletal: Negative for back pain and myalgias.   Skin: Negative for color change, pallor and wound.   Neurological: Negative for dizziness, tremors and weakness.   Hematological: Negative for adenopathy.   Psychiatric/Behavioral: Positive for confusion. Negative for agitation and behavioral problems.     Objective:     Vital Signs (Most Recent):  Temp: 98.1 °F (36.7 °C) (10/06/20 1106)  Pulse: 91 (10/06/20 1510)  Resp: (!) 30 (10/06/20 1111)  BP: (!) 90/44 (10/06/20 1111)  SpO2: 95 % (10/06/20 1111) Vital Signs (24h Range):  Temp:  [97.7 °F (36.5 °C)-99.1 °F (37.3 °C)] 98.1 °F (36.7 °C)  Pulse:  [] 91  Resp:  [14-30] 30  SpO2:  [84 %-99 %] 95 %  BP: ()/(44-83) 90/44   Weight: 134 kg (295 lb 6.7 oz)  Body mass index is 35.03 kg/m².      Intake/Output Summary (Last 24 hours) at 10/6/2020 1537  Last data filed at 10/5/2020 2000  Gross per 24 hour   Intake 480 ml   Output 810 ml   Net -330 ml       Physical Exam  Vitals signs and nursing note reviewed.   Constitutional:       Appearance: Normal appearance. He is obese. He is not ill-appearing, toxic-appearing or diaphoretic.   HENT:      Head:  Normocephalic and atraumatic.      Mouth/Throat:      Mouth: Mucous membranes are moist.      Pharynx: No oropharyngeal exudate.   Eyes:      General: No scleral icterus.     Extraocular Movements: Extraocular movements intact.      Conjunctiva/sclera: Conjunctivae normal.      Pupils: Pupils are equal, round, and reactive to light.   Neck:      Musculoskeletal: Normal range of motion and neck supple.   Cardiovascular:      Rate and Rhythm: Normal rate and regular rhythm.      Pulses: Normal pulses.      Heart sounds: No murmur.   Pulmonary:      Breath sounds: Wheezing present.      Comments: Patient tachypneaic on 5L via nasal canula, with bilateral wheeze  Chest:      Chest wall: No tenderness.   Abdominal:      General: Abdomen is flat. Bowel sounds are normal. There is distension.      Tenderness: There is no abdominal tenderness. There is no guarding or rebound.   Lymphadenopathy:      Cervical: No cervical adenopathy.   Skin:     General: Skin is warm and dry.      Capillary Refill: Capillary refill takes less than 2 seconds.      Comments: Wrinkled, firm, and dark skin of lower extremities to the knees   Neurological:      General: No focal deficit present.      Mental Status: He is alert and oriented to person, place, and time.      Comments: Patient somnolent throughout exam, required frequent reorientation         Vents:  Oxygen Concentration (%): 28 (10/06/20 0350)  Lines/Drains/Airways     Peripheral Intravenous Line                 Peripheral IV - Single Lumen 10/03/20 1526 20 G Anterior;Left;Proximal Forearm 3 days              Significant Labs:    CBC/Anemia Profile:  Recent Labs   Lab 10/05/20  0340 10/06/20  0358   WBC 7.37 14.27*   HGB 12.1* 13.5*   HCT 41.8 45.1    226   MCV 79* 76*   RDW 16.0* 16.7*        Chemistries:  Recent Labs   Lab 10/05/20  0340 10/05/20  1322 10/06/20  0358    140 139   K 4.4 4.1 4.3   CL 94* 93* 94*   CO2 36* 35* 32*   BUN 27* 27* 29*   CREATININE 1.0 1.1  1.2   CALCIUM 9.3 9.6 9.3   ALBUMIN 2.6*  --  2.6*   PROT 7.7  --  8.2   BILITOT 1.1*  --  1.4*   ALKPHOS 86  --  98   ALT 15  --  16   AST 20  --  25   MG 2.1  --  2.0   PHOS 3.9  --  3.0       All pertinent labs within the past 24 hours have been reviewed.    Significant Imaging: I have reviewed and interpreted all pertinent imaging results/findings within the past 24 hours.    Assessment/Plan:     Pulmonary  * Acute hypoxemic respiratory failure  Patient currently on 3L via nasal canula    Plan:  - see chronic combine systolic and diastolic congestive heart failure  - see severe sepsis    Cardiac/Vascular  Biventricular ICD (implantable cardioverter-defibrillator) in place  Placed on 9/28 with Dr. Kwong    Chronic combined systolic and diastolic congestive heart failure  Patient was initially admitted to hospital medicine on 9/29 for shortness of breath and hypoxia due to acute heart failure exacerbation. Patient with ejection fraction of 18% and grade III diastolic dysfunction on 9/29 ECHO. He was briefly placed on BIPAP, now room air. He has been diuresed, initially requiring a lasix drip, and is net -16L since admission. He was planned to transition to PO lasix today and discharge tomorrow.    Current decompensation could be related to underlying cardiogenic shock but likely sepsis.    Plan:  - hold lasix today, plan to restart tomorrow  - hold antihypertensives and goal directed therapy in the setting of potential sepsis    Essential hypertension  Patient on carvedilol, ramipril, and aldactone at home. Switched to metoprolol succinate from carvedilol and entresto since admission. Aldactone held since admission with plan to consider restarting if blood pressure tolerates.    Plan:  - hold antihypertensives in the setting of sepsis    ID  Severe sepsis  On 10/6 patient found to be more tachypneic and hypotensive, with waning mental status. He is febrile with a leukocytosis of 14 and lactate of 2.7 in the  setting of urinalysis with 89 WBC and many bacteria. Patient with 90/55 mmHg, MAP 67 and given 250 ml NS boluses without improvement. Chest radiographs describe improving congestive heart failure without obvious evidence of infection but patient is requiring supplemental oxygen. Initial ABG, 7.45/50/80. Some evidence of penile skin breakdown. Currently has several second apneic/hypoventilatory events followed by tachypnea.     Plan:  - start vancomycin and piperacillin tazobactam  - levophed ordered, start if required to maintain MAP >65  - repeat ABG  - hold further IV fluids in the setting of heart failure  - trend lactate  - repeat COVID-19  - blood and urine cultures pending  - hold antihypertensives          Critical Care Daily Checklist:    A: Awake: RASS Goal/Actual Goal: RASS Goal: 0-->alert and calm  Actual:     B: Spontaneous Breathing Trial Performed?     C: SAT & SBT Coordinated?  N/A                      ` Delirium: CAM-ICU     E: Early Mobility Performed? Yes   F: Feeding Goal:    Status:     Current Diet Order   Procedures        Diet NPO      AS: Analgesia/Sedation N/A   T: Thromboembolic Prophylaxis Heparin SQ   H: HOB > 300 Yes   U: Stress Ulcer Prophylaxis (if needed) N/A   G: Glucose Control Goal 140-180   B: Bowel Function Stool Occurrence: 1   I: Indwelling Catheter (Lines & Mcmahon) Necessity Peripheral left forearm   D: De-escalation of Antimicrobials/Pharmacotherapies Started vancomycin and zosyn    Plan for the day/ETD Monitor hemodynamics, start pressors if needed, antibiotics, follow cultures, repeat lactate    Code Status:  Family/Goals of Care: Full Code   Discussed with family       Critical secondary to Patient has a condition that poses threat to life and bodily function: hypotension requiring pressors     Critical care was time spent personally by me on the following activities: development of treatment plan with patient or surrogate and bedside caregivers, discussions with  consultants, evaluation of patient's response to treatment, examination of patient, ordering and performing treatments and interventions, ordering and review of laboratory studies, ordering and review of radiographic studies, pulse oximetry, re-evaluation of patient's condition. This critical care time did not overlap with that of any other provider or involve time for any procedures.     Tigre Pollock MD  Critical Care Medicine  [facility]

## 2020-10-06 NOTE — PT/OT/SLP DISCHARGE
Physical Therapy Discharge Summary    Name: Papito Bhakta  MRN: [id number]   Principal Problem: Acute hypoxemic respiratory failure     Patient Discharged from acute Physical Therapy on 10/6/2020.  Please refer to prior PT noted date on 10/6/2020 for functional status.     Assessment:     Patient was discharged unexpectedly.  Information required to complete an accurate discharge summary is unknown.  Refer to therapy initial evaluation and last progress note for initial and most recent functional status and goal achievement.  Recommendations made may be found in medical record.    Objective:     GOALS:   Multidisciplinary Problems     Physical Therapy Goals        Problem: Physical Therapy Goal    Goal Priority Disciplines Outcome Goal Variances Interventions   Physical Therapy Goal     PT, PT/OT Ongoing, Progressing     Description: Goals to be met by: 10/13/20     Patient will increase functional independence with mobility by performin. Supine to sit with Moderate Assistance.  2. Sit to supine with Moderate Assistance.  3. Sit to stand transfer with Moderate Assistance, with ankit-walker PRN, maintaining Pacemaker precautions.  4. Bed to chair transfer with Moderate Assistance, with ankit-walker PRN, maintaining Pacemaker precautions.  5. Gait  x 50 feet with Moderate Assistance, with ankit-walker PRN, maintaining Pacemaker precautions.   6. Ascend/Descend 6 inch curb step with Moderate Assistance, with ankit-walker PRN, maintaining Pacemaker precautions.  7. Lower extremity exercise program x 20 reps per handout, with assistance as needed.                     Reasons for Discontinuation of Therapy Services  Transfer to alternate level of care. and Patient is unable to continue work toward goals because of medical or psychosocial complications.      Plan:     Patient Discharged to: CMICU. PT orders discontinued at this time. Please place new PT orders when pt medically appropriate for PT re-evaluation and  treatment.     Megan Colin, PT, DPT   10/6/2020  [phone]

## 2020-10-07 PROBLEM — N17.9 AKI (ACUTE KIDNEY INJURY): Status: ACTIVE | Noted: 2020-10-07

## 2020-10-07 LAB
ALBUMIN SERPL BCP-MCNC: 2.4 G/DL (ref 3.5–5.2)
ALBUMIN SERPL BCP-MCNC: 2.5 G/DL (ref 3.5–5.2)
ALP SERPL-CCNC: 100 U/L (ref 55–135)
ALP SERPL-CCNC: 101 U/L (ref 55–135)
ALT SERPL W/O P-5'-P-CCNC: 23 U/L (ref 10–44)
ALT SERPL W/O P-5'-P-CCNC: 23 U/L (ref 10–44)
AMMONIA PLAS-SCNC: 55 UMOL/L (ref 10–50)
ANION GAP SERPL CALC-SCNC: 11 MMOL/L (ref 8–16)
ANION GAP SERPL CALC-SCNC: 13 MMOL/L (ref 8–16)
AST SERPL-CCNC: 33 U/L (ref 10–40)
AST SERPL-CCNC: 39 U/L (ref 10–40)
BASOPHILS # BLD AUTO: 0.09 K/UL (ref 0–0.2)
BASOPHILS NFR BLD: 0.4 % (ref 0–1.9)
BILIRUB SERPL-MCNC: 1.3 MG/DL (ref 0.1–1)
BILIRUB SERPL-MCNC: 1.6 MG/DL (ref 0.1–1)
BUN SERPL-MCNC: 43 MG/DL (ref 8–23)
BUN SERPL-MCNC: 48 MG/DL (ref 8–23)
CALCIUM SERPL-MCNC: 9.4 MG/DL (ref 8.7–10.5)
CALCIUM SERPL-MCNC: 9.5 MG/DL (ref 8.7–10.5)
CHLORIDE SERPL-SCNC: 98 MMOL/L (ref 95–110)
CHLORIDE SERPL-SCNC: 98 MMOL/L (ref 95–110)
CO2 SERPL-SCNC: 31 MMOL/L (ref 23–29)
CO2 SERPL-SCNC: 31 MMOL/L (ref 23–29)
CREAT SERPL-MCNC: 1.7 MG/DL (ref 0.5–1.4)
CREAT SERPL-MCNC: 2.2 MG/DL (ref 0.5–1.4)
DIFFERENTIAL METHOD: ABNORMAL
EOSINOPHIL # BLD AUTO: 0 K/UL (ref 0–0.5)
EOSINOPHIL NFR BLD: 0.1 % (ref 0–8)
ERYTHROCYTE [DISTWIDTH] IN BLOOD BY AUTOMATED COUNT: 17.5 % (ref 11.5–14.5)
EST. GFR  (AFRICAN AMERICAN): 35 ML/MIN/1.73 M^2
EST. GFR  (AFRICAN AMERICAN): 47.9 ML/MIN/1.73 M^2
EST. GFR  (NON AFRICAN AMERICAN): 30.3 ML/MIN/1.73 M^2
EST. GFR  (NON AFRICAN AMERICAN): 41.4 ML/MIN/1.73 M^2
GLUCOSE SERPL-MCNC: 121 MG/DL (ref 70–110)
GLUCOSE SERPL-MCNC: 128 MG/DL (ref 70–110)
HCT VFR BLD AUTO: 46.8 % (ref 40–54)
HGB BLD-MCNC: 13.7 G/DL (ref 14–18)
IMM GRANULOCYTES # BLD AUTO: 0.13 K/UL (ref 0–0.04)
IMM GRANULOCYTES NFR BLD AUTO: 0.6 % (ref 0–0.5)
LACTATE SERPL-SCNC: 0.9 MMOL/L (ref 0.5–2.2)
LYMPHOCYTES # BLD AUTO: 1.7 K/UL (ref 1–4.8)
LYMPHOCYTES NFR BLD: 8.3 % (ref 18–48)
MAGNESIUM SERPL-MCNC: 2.3 MG/DL (ref 1.6–2.6)
MAGNESIUM SERPL-MCNC: 2.4 MG/DL (ref 1.6–2.6)
MCH RBC QN AUTO: 23 PG (ref 27–31)
MCHC RBC AUTO-ENTMCNC: 29.3 G/DL (ref 32–36)
MCV RBC AUTO: 79 FL (ref 82–98)
MONOCYTES # BLD AUTO: 2.2 K/UL (ref 0.3–1)
MONOCYTES NFR BLD: 11 % (ref 4–15)
NEUTROPHILS # BLD AUTO: 16 K/UL (ref 1.8–7.7)
NEUTROPHILS NFR BLD: 79.6 % (ref 38–73)
NRBC BLD-RTO: 0 /100 WBC
PHOSPHATE SERPL-MCNC: 3.6 MG/DL (ref 2.7–4.5)
PHOSPHATE SERPL-MCNC: 4 MG/DL (ref 2.7–4.5)
PLATELET # BLD AUTO: 166 K/UL (ref 150–350)
PMV BLD AUTO: 11.5 FL (ref 9.2–12.9)
POTASSIUM SERPL-SCNC: 3.9 MMOL/L (ref 3.5–5.1)
POTASSIUM SERPL-SCNC: 4 MMOL/L (ref 3.5–5.1)
PROT SERPL-MCNC: 8 G/DL (ref 6–8.4)
PROT SERPL-MCNC: 8.2 G/DL (ref 6–8.4)
RBC # BLD AUTO: 5.96 M/UL (ref 4.6–6.2)
SODIUM SERPL-SCNC: 140 MMOL/L (ref 136–145)
SODIUM SERPL-SCNC: 142 MMOL/L (ref 136–145)
TSH SERPL DL<=0.005 MIU/L-ACNC: 1.21 UIU/ML (ref 0.4–4)
WBC # BLD AUTO: 20.13 K/UL (ref 3.9–12.7)

## 2020-10-07 PROCEDURE — 94660 CPAP INITIATION&MGMT: CPT | Mod: HCNC

## 2020-10-07 PROCEDURE — 83605 ASSAY OF LACTIC ACID: CPT | Mod: HCNC

## 2020-10-07 PROCEDURE — 82140 ASSAY OF AMMONIA: CPT | Mod: HCNC

## 2020-10-07 PROCEDURE — 99233 SBSQ HOSP IP/OBS HIGH 50: CPT | Mod: HCNC,GC,, | Performed by: INTERNAL MEDICINE

## 2020-10-07 PROCEDURE — 63600175 PHARM REV CODE 636 W HCPCS: Mod: HCNC | Performed by: STUDENT IN AN ORGANIZED HEALTH CARE EDUCATION/TRAINING PROGRAM

## 2020-10-07 PROCEDURE — 25000003 PHARM REV CODE 250: Mod: HCNC | Performed by: STUDENT IN AN ORGANIZED HEALTH CARE EDUCATION/TRAINING PROGRAM

## 2020-10-07 PROCEDURE — 84443 ASSAY THYROID STIM HORMONE: CPT | Mod: HCNC

## 2020-10-07 PROCEDURE — 99900035 HC TECH TIME PER 15 MIN (STAT): Mod: HCNC

## 2020-10-07 PROCEDURE — 99233 PR SUBSEQUENT HOSPITAL CARE,LEVL III: ICD-10-PCS | Mod: HCNC,GC,, | Performed by: INTERNAL MEDICINE

## 2020-10-07 PROCEDURE — 94761 N-INVAS EAR/PLS OXIMETRY MLT: CPT | Mod: HCNC

## 2020-10-07 PROCEDURE — 20000000 HC ICU ROOM: Mod: HCNC

## 2020-10-07 PROCEDURE — 83735 ASSAY OF MAGNESIUM: CPT | Mod: 91,HCNC

## 2020-10-07 PROCEDURE — 80053 COMPREHEN METABOLIC PANEL: CPT | Mod: HCNC

## 2020-10-07 PROCEDURE — 63600175 PHARM REV CODE 636 W HCPCS: Mod: HCNC | Performed by: INTERNAL MEDICINE

## 2020-10-07 PROCEDURE — 80053 COMPREHEN METABOLIC PANEL: CPT | Mod: 91,HCNC

## 2020-10-07 PROCEDURE — 84100 ASSAY OF PHOSPHORUS: CPT | Mod: HCNC

## 2020-10-07 PROCEDURE — 84100 ASSAY OF PHOSPHORUS: CPT | Mod: 91,HCNC

## 2020-10-07 PROCEDURE — 25000003 PHARM REV CODE 250: Mod: HCNC | Performed by: NURSE PRACTITIONER

## 2020-10-07 PROCEDURE — 83735 ASSAY OF MAGNESIUM: CPT | Mod: HCNC

## 2020-10-07 PROCEDURE — 27000221 HC OXYGEN, UP TO 24 HOURS: Mod: HCNC

## 2020-10-07 PROCEDURE — 85025 COMPLETE CBC W/AUTO DIFF WBC: CPT | Mod: HCNC

## 2020-10-07 RX ORDER — MIDODRINE HYDROCHLORIDE 5 MG/1
10 TABLET ORAL 3 TIMES DAILY
Status: DISCONTINUED | OUTPATIENT
Start: 2020-10-07 | End: 2020-10-08

## 2020-10-07 RX ORDER — FUROSEMIDE 10 MG/ML
80 INJECTION INTRAMUSCULAR; INTRAVENOUS ONCE
Status: COMPLETED | OUTPATIENT
Start: 2020-10-07 | End: 2020-10-07

## 2020-10-07 RX ADMIN — POLYETHYLENE GLYCOL 3350 17 G: 17 POWDER, FOR SOLUTION ORAL at 09:10

## 2020-10-07 RX ADMIN — PIPERACILLIN SODIUM AND TAZOBACTAM SODIUM 4.5 G: 4; .5 INJECTION, POWDER, LYOPHILIZED, FOR SOLUTION INTRAVENOUS at 10:10

## 2020-10-07 RX ADMIN — HEPARIN SODIUM 5000 UNITS: 5000 INJECTION INTRAVENOUS; SUBCUTANEOUS at 02:10

## 2020-10-07 RX ADMIN — PIPERACILLIN SODIUM AND TAZOBACTAM SODIUM 4.5 G: 4; .5 INJECTION, POWDER, LYOPHILIZED, FOR SOLUTION INTRAVENOUS at 06:10

## 2020-10-07 RX ADMIN — PRAVASTATIN SODIUM 80 MG: 40 TABLET ORAL at 09:10

## 2020-10-07 RX ADMIN — HEPARIN SODIUM 5000 UNITS: 5000 INJECTION INTRAVENOUS; SUBCUTANEOUS at 09:10

## 2020-10-07 RX ADMIN — GABAPENTIN 100 MG: 100 CAPSULE ORAL at 09:10

## 2020-10-07 RX ADMIN — PIPERACILLIN SODIUM AND TAZOBACTAM SODIUM 4.5 G: 4; .5 INJECTION, POWDER, LYOPHILIZED, FOR SOLUTION INTRAVENOUS at 02:10

## 2020-10-07 RX ADMIN — GABAPENTIN 100 MG: 100 CAPSULE ORAL at 03:10

## 2020-10-07 RX ADMIN — HEPARIN SODIUM 5000 UNITS: 5000 INJECTION INTRAVENOUS; SUBCUTANEOUS at 06:10

## 2020-10-07 RX ADMIN — FUROSEMIDE 80 MG: 10 INJECTION, SOLUTION INTRAMUSCULAR; INTRAVENOUS at 09:10

## 2020-10-07 RX ADMIN — MIDODRINE HYDROCHLORIDE 10 MG: 5 TABLET ORAL at 09:10

## 2020-10-07 RX ADMIN — MIDODRINE HYDROCHLORIDE 10 MG: 5 TABLET ORAL at 03:10

## 2020-10-07 RX ADMIN — DOCUSATE SODIUM 50MG AND SENNOSIDES 8.6MG 1 TABLET: 8.6; 5 TABLET, FILM COATED ORAL at 09:10

## 2020-10-07 RX ADMIN — DOCUSATE SODIUM 50MG AND SENNOSIDES 8.6MG 1 TABLET: 8.6; 5 TABLET, FILM COATED ORAL at 08:10

## 2020-10-07 RX ADMIN — GABAPENTIN 100 MG: 100 CAPSULE ORAL at 08:10

## 2020-10-07 RX ADMIN — VANCOMYCIN HYDROCHLORIDE 1750 MG: 750 INJECTION, POWDER, LYOPHILIZED, FOR SOLUTION INTRAVENOUS at 06:10

## 2020-10-07 RX ADMIN — MIDODRINE HYDROCHLORIDE 10 MG: 5 TABLET ORAL at 08:10

## 2020-10-07 NOTE — PROGRESS NOTES
Pharmacokinetic Assessment Follow Up: IV Vancomycin    Vancomycin Regimen Assessment & Plan:  - Patient with RIGOBERTO, SCr 1.2 --> 2.2 over 24h.  - Empirically changing from scheduled regimen to pulse dosing.   - Draw vancomycin level with morning labs tomorrow. Will re-dose as needed vs schedule new regimen depending on level and renal function. Goal trough 10-20 ug/mL.    Drug levels (last 3 results):  No results for input(s): VANCOMYCINRA, VANCOMYCINPE, VANCOMYCINTR, VANCOTROUGH in the last 72 hours.    Pharmacy will continue to follow and monitor vancomycin.    Please contact pharmacy at extension [phone] for questions regarding this assessment.    Thank you for the consult,   Jan Wiggins     Patient brief summary:  Papito Bhakta is a 65 y.o. male initiated on antimicrobial therapy with IV Vancomycin for treatment of urinary tract infection    The patient's current regimen is pulse dosing.    Drug Allergies:   Review of patient's allergies indicates:  No Known Allergies    Actual Body Weight:   133.5 kg    Renal Function:   Estimated Creatinine Clearance: 65.5 mL/min (A) (based on SCr of 1.7 mg/dL (H)).,     Dialysis Method (if applicable):  N/A

## 2020-10-07 NOTE — SUBJECTIVE & OBJECTIVE
Past Medical History:   Diagnosis Date    Anticoagulant long-term use     Aspirin    CHF (congestive heart failure)     Hyperlipidemia     Hypertension     NICM (nonischemic cardiomyopathy) 2020    Obesity        Past Surgical History:   Procedure Laterality Date    INSERTION OF BIVENTRICULAR IMPLANTABLE CARDIOVERTER-DEFIBRILLATOR (ICD) N/A 2019    Procedure: INSERTION, ICD, BIVENTRICULAR;  Surgeon: Shailesh Eng MD;  Location: [facility] CATH LAB;  Service: Cardiology;  Laterality: N/A;  RN PRE OP -  1ST CASE PER  RAD[facility]. NOTIFIED [facility] THAT ANESTHESIA IS NOT PITO FOR 1ST CASE START-LO    INSERTION OF BIVENTRICULAR IMPLANTABLE CARDIOVERTER-DEFIBRILLATOR (ICD) N/A 2020    Procedure: INSERTION, ICD, BIVENTRICULAR;  Surgeon: Jim Kwong MD;  Location: [facility] EP LAB;  Service: Cardiology;  Laterality: N/A;  NICM, CRT-D, SJM,, MAC, DM, 3 Prep*Wearing LifeVest*    oral extraction  2018    TESTICLE SURGERY         Review of patient's allergies indicates:  No Known Allergies    Family History     Problem Relation (Age of Onset)    Epilepsy Mother        Tobacco Use    Smoking status: Former Smoker     Packs/day: 0.50     Years: 40.00     Pack years: 20.00     Types: Cigarettes, Cigars     Quit date:      Years since quittin.7    Smokeless tobacco: Never Used   Substance and Sexual Activity    Alcohol use: Yes     Comment: once a month    Drug use: No    Sexual activity: Yes     Birth control/protection: None     Comment: uses protection sometimes      Review of Systems   Constitutional: Negative for activity change, chills, diaphoresis, fatigue, fever and unexpected weight change (weight gain ).   HENT: Negative for facial swelling and trouble swallowing.    Eyes: Negative for visual disturbance.   Respiratory: Negative for apnea, cough, choking, chest tightness, shortness of breath and wheezing.    Cardiovascular: Positive for leg swelling. Negative for chest pain and  palpitations.   Gastrointestinal: Positive for abdominal distention. Negative for abdominal pain, blood in stool, constipation, diarrhea, nausea and vomiting.   Endocrine: Negative for cold intolerance, heat intolerance and polyuria.   Genitourinary: Negative for dysuria, enuresis, frequency, hematuria and urgency.   Musculoskeletal: Negative for back pain and myalgias.   Skin: Negative for color change, pallor and wound.   Neurological: Negative for dizziness, tremors and weakness.   Hematological: Negative for adenopathy.   Psychiatric/Behavioral: Negative for agitation, behavioral problems and confusion.     Objective:     Vital Signs (Most Recent):  Temp: 98.3 °F (36.8 °C) (10/07/20 1500)  Pulse: 78 (10/07/20 1500)  Resp: 17 (10/07/20 1500)  BP: 96/62 (10/07/20 1500)  SpO2: 99 % (10/07/20 1500) Vital Signs (24h Range):  Temp:  [97.8 °F (36.6 °C)-101.9 °F (38.8 °C)] 98.3 °F (36.8 °C)  Pulse:  [67-92] 78  Resp:  [17-44] 17  SpO2:  [93 %-100 %] 99 %  BP: ()/(44-71) 96/62   Weight: 133.5 kg (294 lb 5 oz)  Body mass index is 34.9 kg/m².      Intake/Output Summary (Last 24 hours) at 10/7/2020 1526  Last data filed at 10/7/2020 1500  Gross per 24 hour   Intake 1452.63 ml   Output 1385 ml   Net 67.63 ml       Physical Exam  Vitals signs and nursing note reviewed.   Constitutional:       Appearance: Normal appearance. He is obese. He is not ill-appearing, toxic-appearing or diaphoretic.   HENT:      Head: Normocephalic and atraumatic.      Mouth/Throat:      Mouth: Mucous membranes are moist.      Pharynx: No oropharyngeal exudate.   Eyes:      General: No scleral icterus.     Extraocular Movements: Extraocular movements intact.      Conjunctiva/sclera: Conjunctivae normal.      Pupils: Pupils are equal, round, and reactive to light.   Neck:      Musculoskeletal: Normal range of motion and neck supple.   Cardiovascular:      Rate and Rhythm: Normal rate and regular rhythm.      Pulses: Normal pulses.      Heart  sounds: No murmur.   Pulmonary:      Breath sounds: No wheezing.      Comments: Patient currently on 3L  Chest:      Chest wall: No tenderness.   Abdominal:      General: Abdomen is flat. Bowel sounds are normal. There is distension.      Tenderness: There is no abdominal tenderness. There is no guarding or rebound.   Lymphadenopathy:      Cervical: No cervical adenopathy.   Skin:     General: Skin is warm and dry.      Capillary Refill: Capillary refill takes less than 2 seconds.      Comments: Wrinkled, firm, and dark skin of lower extremities to the knees   Neurological:      General: No focal deficit present.      Mental Status: He is alert and oriented to person, place, and time.      Comments: Patient at baseline mental status per daughter         Vents:  Oxygen Concentration (%): 28 (10/07/20 0409)  Lines/Drains/Airways     Peripheral Intravenous Line                 Peripheral IV - Single Lumen 10/06/20 1800 20 G Posterior;Right Hand less than 1 day         Peripheral IV - Single Lumen 10/07/20 0920 20 G;1 3/4 in Right Upper Arm less than 1 day         Peripheral IV - Single Lumen 10/07/20 0922 20 G Left;Posterior Hand less than 1 day              Significant Labs:    CBC/Anemia Profile:  Recent Labs   Lab 10/06/20  0358 10/07/20  0409   WBC 14.27* 20.13*   HGB 13.5* 13.7*   HCT 45.1 46.8    166   MCV 76* 79*   RDW 16.7* 17.5*        Chemistries:  Recent Labs   Lab 10/06/20  0358 10/07/20  0409 10/07/20  1055    142 140   K 4.3 3.9 4.0   CL 94* 98 98   CO2 32* 31* 31*   BUN 29* 48* 43*   CREATININE 1.2 2.2* 1.7*   CALCIUM 9.3 9.5 9.4   ALBUMIN 2.6* 2.5* 2.4*   PROT 8.2 8.0 8.2   BILITOT 1.4* 1.6* 1.3*   ALKPHOS 98 101 100   ALT 16 23 23   AST 25 33 39   MG 2.0 2.3 2.4   PHOS 3.0 4.0 3.6       All pertinent labs within the past 24 hours have been reviewed.    Significant Imaging: I have reviewed and interpreted all pertinent imaging results/findings within the past 24 hours.

## 2020-10-07 NOTE — PT/OT/SLP DISCHARGE
Occupational Therapy Discharge Summary    Papito Bhakta  MRN: [id number]   Principal Problem: Acute hypoxemic respiratory failure      Patient Discharged from acute Occupational Therapy 2* transfer to ICU. Please refer to prior OT note dated 10/6/2020 for functional status.    Assessment:      Patient transferred to ICU 10/6 2* tachypnea and hypotension. OT orders discontinued following transfer and will need new orders when medically appropriate.     Objective:     GOALS:   Multidisciplinary Problems     Occupational Therapy Goals        Problem: Occupational Therapy Goal    Goal Priority Disciplines Outcome Interventions   Occupational Therapy Goal     OT, PT/OT Ongoing, Progressing    Description: Goals to be met by: 10/14/2020    Patient will increase functional independence with ADLs by performing:    UE Dressing with Supervision.  LE Dressing with Minimal Assistance.  Grooming while standing with Contact Guard Assistance.  Supine to sit with Minimal Assistance.  Toilet transfer to toilet with Minimal Assistance.                   Reasons for Discontinuation of Therapy Services  Transfer to ICU      Plan:     Patient Discharged to: ICU    Elaine Zamudio OT  10/7/2020

## 2020-10-07 NOTE — PROGRESS NOTES
[facility]  Critical Care Medicine  Progress Note    Patient Name: Papito Bhakta  MRN: [id number]  Admission Date: 9/28/2020  Hospital Length of Stay: 7 days  Code Status: Full Code  Attending Provider: Silvia Katz MD  Primary Care Provider: Brynn To MD   Principal Problem: Acute hypoxemic respiratory failure    Subjective:     HPI:  Papito Bhakta is a 65 year old man with non-ischemic cardiomyopathy with AICD and recent cardiac resynchronization therapy with defibrillation 9/27, mixed diastolic and systolic heart failure (EF 18% with grade III diastolic dysfunction 9/30), hyperlipidemia, hypertension who is being admitted to critical care for worsening hypotension. Patient was admitted on 9/29 for shortness of breath and hypoxia due to acute heart failure exacerbation, was followed by heart transplant service. He was briefly placed on BIPAP, now room air. He has been diuresed, initially requiring a lasix drip, and is net -16L since admission. He was planned to transition to PO lasix today and discharge tomorrow.    Patient was found in the morning with tachypnea and accessory muscle use with blood pressure of 90/55 mmHg, MAP 67, and oxygen saturation of 87%. He was placed on 4L oxygen initially, now 3L. He has had worsening mental status. Initially patient, found to have delayed answers now more somnolent. He was given 2x 250mL NS bolus. Urinalysis relevant for many bacteria and 89 WBC. His lactate was 2.7. He now has leukocytosis of 14.2 from 7.3. He was started on piperacillin tazobactam. No history of resistant urinary tract infections. He was found to have penile skin breakdown, per wound care notes. Patient being admitted to critical care in the setting of likely sepsis and requiring vasopressors.     Hospital/ICU Course:  Patient was admitted on 9/29 for shortness of breath and hypoxia due to acute heart failure exacerbation. He was briefly placed on BIPAP, now room air. He  has been diuresed, initially requiring a lasix drip, and is net -16L since admission. He was initally planned to transition to PO lasix 10/6 and discharge 10/7 but patient was found in the morning with tachypnea requiring supplemental oxygen and hypotension unresolved by fluid boluses. Additionally, patient has had worsening mental status during this time. Patient being admitted to critical care in the setting of likely sepsis and requiring vasopressors. Patient briefly required norepinephrine and is now on midodrine. His mental status has improved significantly overnight, now at baseline. Currently on 3L.    Past Medical History:   Diagnosis Date    Anticoagulant long-term use     Aspirin    CHF (congestive heart failure)     Hyperlipidemia     Hypertension     NICM (nonischemic cardiomyopathy) 2020    Obesity        Past Surgical History:   Procedure Laterality Date    INSERTION OF BIVENTRICULAR IMPLANTABLE CARDIOVERTER-DEFIBRILLATOR (ICD) N/A 2019    Procedure: INSERTION, ICD, BIVENTRICULAR;  Surgeon: Shailesh Eng MD;  Location: [facility] CATH LAB;  Service: Cardiology;  Laterality: N/A;  RN PRE OP 19  1ST CASE PER  RADHA. NOTIFIED [facility] THAT ANESTHESIA IS NOT PITO FOR 1ST CASE START-LO    INSERTION OF BIVENTRICULAR IMPLANTABLE CARDIOVERTER-DEFIBRILLATOR (ICD) N/A 2020    Procedure: INSERTION, ICD, BIVENTRICULAR;  Surgeon: Jim Kwong MD;  Location: [facility] EP LAB;  Service: Cardiology;  Laterality: N/A;  NICM, CRT-D, SJM,, MAC, DM, 3 Prep*Wearing LifeVest*    oral extraction  2018    TESTICLE SURGERY         Review of patient's allergies indicates:  No Known Allergies    Family History     Problem Relation (Age of Onset)    Epilepsy Mother        Tobacco Use    Smoking status: Former Smoker     Packs/day: 0.50     Years: 40.00     Pack years: 20.00     Types: Cigarettes, Cigars     Quit date:      Years since quittin.7    Smokeless tobacco: Never Used   Substance and  Sexual Activity    Alcohol use: Yes     Comment: once a month    Drug use: No    Sexual activity: Yes     Birth control/protection: None     Comment: uses protection sometimes      Review of Systems   Constitutional: Negative for activity change, chills, diaphoresis, fatigue, fever and unexpected weight change (weight gain ).   HENT: Negative for facial swelling and trouble swallowing.    Eyes: Negative for visual disturbance.   Respiratory: Negative for apnea, cough, choking, chest tightness, shortness of breath and wheezing.    Cardiovascular: Positive for leg swelling. Negative for chest pain and palpitations.   Gastrointestinal: Positive for abdominal distention. Negative for abdominal pain, blood in stool, constipation, diarrhea, nausea and vomiting.   Endocrine: Negative for cold intolerance, heat intolerance and polyuria.   Genitourinary: Negative for dysuria, enuresis, frequency, hematuria and urgency.   Musculoskeletal: Negative for back pain and myalgias.   Skin: Negative for color change, pallor and wound.   Neurological: Negative for dizziness, tremors and weakness.   Hematological: Negative for adenopathy.   Psychiatric/Behavioral: Negative for agitation, behavioral problems and confusion.     Objective:     Vital Signs (Most Recent):  Temp: 98.3 °F (36.8 °C) (10/07/20 1500)  Pulse: 78 (10/07/20 1500)  Resp: 17 (10/07/20 1500)  BP: 96/62 (10/07/20 1500)  SpO2: 99 % (10/07/20 1500) Vital Signs (24h Range):  Temp:  [97.8 °F (36.6 °C)-101.9 °F (38.8 °C)] 98.3 °F (36.8 °C)  Pulse:  [67-92] 78  Resp:  [17-44] 17  SpO2:  [93 %-100 %] 99 %  BP: ()/(44-71) 96/62   Weight: 133.5 kg (294 lb 5 oz)  Body mass index is 34.9 kg/m².      Intake/Output Summary (Last 24 hours) at 10/7/2020 1526  Last data filed at 10/7/2020 1500  Gross per 24 hour   Intake 1452.63 ml   Output 1385 ml   Net 67.63 ml       Physical Exam  Vitals signs and nursing note reviewed.   Constitutional:       Appearance: Normal  appearance. He is obese. He is not ill-appearing, toxic-appearing or diaphoretic.   HENT:      Head: Normocephalic and atraumatic.      Mouth/Throat:      Mouth: Mucous membranes are moist.      Pharynx: No oropharyngeal exudate.   Eyes:      General: No scleral icterus.     Extraocular Movements: Extraocular movements intact.      Conjunctiva/sclera: Conjunctivae normal.      Pupils: Pupils are equal, round, and reactive to light.   Neck:      Musculoskeletal: Normal range of motion and neck supple.   Cardiovascular:      Rate and Rhythm: Normal rate and regular rhythm.      Pulses: Normal pulses.      Heart sounds: No murmur.   Pulmonary:      Breath sounds: No wheezing.      Comments: Patient currently on 3L  Chest:      Chest wall: No tenderness.   Abdominal:      General: Abdomen is flat. Bowel sounds are normal. There is distension.      Tenderness: There is no abdominal tenderness. There is no guarding or rebound.   Lymphadenopathy:      Cervical: No cervical adenopathy.   Skin:     General: Skin is warm and dry.      Capillary Refill: Capillary refill takes less than 2 seconds.      Comments: Wrinkled, firm, and dark skin of lower extremities to the knees   Neurological:      General: No focal deficit present.      Mental Status: He is alert and oriented to person, place, and time.      Comments: Patient at baseline mental status per daughter         Vents:  Oxygen Concentration (%): 28 (10/07/20 0409)  Lines/Drains/Airways     Peripheral Intravenous Line                 Peripheral IV - Single Lumen 10/06/20 1800 20 G Posterior;Right Hand less than 1 day         Peripheral IV - Single Lumen 10/07/20 0920 20 G;1 3/4 in Right Upper Arm less than 1 day         Peripheral IV - Single Lumen 10/07/20 0922 20 G Left;Posterior Hand less than 1 day              Significant Labs:    CBC/Anemia Profile:  Recent Labs   Lab 10/06/20  0358 10/07/20  0409   WBC 14.27* 20.13*   HGB 13.5* 13.7*   HCT 45.1 46.8    166    MCV 76* 79*   RDW 16.7* 17.5*        Chemistries:  Recent Labs   Lab 10/06/20  0358 10/07/20  0409 10/07/20  1055    142 140   K 4.3 3.9 4.0   CL 94* 98 98   CO2 32* 31* 31*   BUN 29* 48* 43*   CREATININE 1.2 2.2* 1.7*   CALCIUM 9.3 9.5 9.4   ALBUMIN 2.6* 2.5* 2.4*   PROT 8.2 8.0 8.2   BILITOT 1.4* 1.6* 1.3*   ALKPHOS 98 101 100   ALT 16 23 23   AST 25 33 39   MG 2.0 2.3 2.4   PHOS 3.0 4.0 3.6       All pertinent labs within the past 24 hours have been reviewed.    Significant Imaging: I have reviewed and interpreted all pertinent imaging results/findings within the past 24 hours.      ABG  Recent Labs   Lab 10/06/20  1416   PH 7.451*   PO2 80   PCO2 49.6*   HCO3 34.6*   BE 11     Assessment/Plan:     Pulmonary  * Acute hypoxemic respiratory failure  Patient currently on 3L via nasal canula    Plan:  - see chronic combine systolic and diastolic congestive heart failure  - see severe sepsis    Cardiac/Vascular  Biventricular ICD (implantable cardioverter-defibrillator) in place  Placed on 9/28 with Dr. Kwong    Chronic combined systolic and diastolic congestive heart failure  Patient was initially admitted to hospital medicine on 9/29 for shortness of breath and hypoxia due to acute heart failure exacerbation. Patient with ejection fraction of 18% and grade III diastolic dysfunction on 9/29 ECHO. He was briefly placed on BIPAP, now room air. He has been diuresed, initially requiring a lasix drip, and is net -16L since admission. Initially, he was planned to transition to 80 mg BID PO lasix 10/6 and discharge 10/7.    Current decompensation could be related to underlying cardiogenic shock but likely sepsis.    Plan:  - given 80 mg lasix once today  - hold antihypertensives and goal directed therapy in the setting of potential sepsis    Essential hypertension  Patient on carvedilol, ramipril, and aldactone at home. Switched to metoprolol succinate from carvedilol and entresto since admission. Aldactone held  since admission with plan to consider restarting if blood pressure tolerates.    Plan:  - hold antihypertensives in the setting of sepsis      Renal  Acute kidney injury      Likely prerenal in the setting of hypotension, improved since AM          Plan:        - continue to trend cmp daily        - avoid nephrotoxic medications and renally dose medications        - maintain MAP >65    ID  Severe sepsis  On 10/6 patient found to be more tachypneic and hypotensive, with waning mental status. He is febrile with a leukocytosis of 14 and lactate of 2.7 in the setting of urinalysis with 89 WBC and many bacteria. Patient with 90/55 mmHg, MAP 67 and given 250 ml NS boluses without improvement. Chest radiographs describe improving congestive heart failure without obvious evidence of infection but patient is requiring supplemental oxygen. Initial ABG, 7.45/50/80. Some evidence of penile skin breakdown. He had several second apneic/hypoventilatory events followed by tachypnea upon admission to ICU. COVID negative.    Required norepinephrine briefly overnight, now on midodrine. Lactate improved to 0.7.    Plan:  - continue vancomycin and piperacillin tazobactam  - continue midodrine 15 mg TID  - hold further IV fluids in the setting of heart failure  - hold antihypertensives         Critical Care Daily Checklist:     A: Awake: RASS Goal/Actual Goal: RASS Goal: 0-->alert and calm  Actual:     B: Spontaneous Breathing Trial Performed?    C: SAT & SBT Coordinated?  N/A                      ` Delirium: CAM-ICU    E: Early Mobility Performed? Yes   F: Feeding Goal:    Status:         Cardiac, low sodium, 1500 ml fluid restriction   AS: Analgesia/Sedation N/A   T: Thromboembolic Prophylaxis Heparin SQ   H: HOB > 300 Yes   U: Stress Ulcer Prophylaxis (if needed) N/A   G: Glucose Control Goal 140-180   B: Bowel Function Stool Occurrence: 1   I: Indwelling Catheter (Lines & Mcmahon) Necessity Peripheral left forearm   D: De-escalation of  Antimicrobials/Pharmacotherapies Vancomycin and zosyn     Plan for the day/ETD Monitor hemodynamics     Code Status:  Family/Goals of Care: Full Code   Discussed with family        Critical secondary to Patient has a condition that poses threat to life and bodily function: sepsis requiring pressors      Critical care was time spent personally by me on the following activities: development of treatment plan with patient or surrogate and bedside caregivers, discussions with consultants, evaluation of patient's response to treatment, examination of patient, ordering and performing treatments and interventions, ordering and review of laboratory studies, ordering and review of radiographic studies, pulse oximetry, re-evaluation of patient's condition. This critical care time did not overlap with that of any other provider or involve time for any procedures.     Tigre Pollock MD  Critical Care Medicine  [facility]

## 2020-10-07 NOTE — NURSING
Critical care team called at #[phone] and notified that pt. had a 7 beat run of V tac. Strip has been printed and placed in chart. Mr. Bhakta is not complaining of anything new. BP is 108/70. Per team, 12 lead ekg obtained (printed and sent to [facility]), will collect CMP, Mag, and phos.

## 2020-10-07 NOTE — ASSESSMENT & PLAN NOTE
Likely prerenal in the setting of hypotension, improved to 1.5 and 1.7    Plan:  - continue to trend cmp daily  - avoid nephrotoxic medications and renally dose medications  - maintain MAP >65

## 2020-10-07 NOTE — ASSESSMENT & PLAN NOTE
Patient was initially admitted to hospital medicine on 9/29 for shortness of breath and hypoxia due to acute heart failure exacerbation. Patient with ejection fraction of 18% and grade III diastolic dysfunction on 9/29 ECHO. He was briefly placed on BIPAP, now room air. He has been diuresed, initially requiring a lasix drip, and is net -16L since admission. Initially, he was planned to transition to 80 mg BID PO lasix 10/6 and discharge 10/7.    Current decompensation could be related to underlying cardiogenic shock but likely sepsis.    Plan:  - given 80 mg lasix once today  - hold antihypertensives and goal directed therapy in the setting of potential sepsis

## 2020-10-07 NOTE — ASSESSMENT & PLAN NOTE
On 10/6 patient found to be more tachypneic and hypotensive, with waning mental status. He is febrile with a leukocytosis of 14 and lactate of 2.7 in the setting of urinalysis with 89 WBC and many bacteria. Patient with 90/55 mmHg, MAP 67 and given 250 ml NS boluses without improvement. Chest radiographs describe improving congestive heart failure without obvious evidence of infection but patient is requiring supplemental oxygen. Initial ABG, 7.45/50/80. Some evidence of penile skin breakdown. He had several second apneic/hypoventilatory events followed by tachypnea upon admission to ICU. COVID negative.    Required norepinephrine briefly overnight, now on midodrine. Lactate improved to 0.7.    Plan:  - continue vancomycin and piperacillin tazobactam  - continue midodrine 15 mg TID  - hold further IV fluids in the setting of heart failure  - hold antihypertensives

## 2020-10-07 NOTE — PLAN OF CARE
CMICU DAILY GOALS       A: Awake    RASS: Goal - RASS Goal: 0-->alert and calm  Actual - RASS ([place] Agitation-Sedation Scale): -1-->drowsy   Restraint necessity:    B: Breath   SBT: Not intubated   C: Coordinate A & B, analgesics/sedatives   Pain: managed    SAT: Not intubated  D: Delirium   CAM-ICU: Overall CAM-ICU: Negative  E: Early(intubated/ Progressive (non-intubated) Mobility   MOVE Screen: Pass   Activity: Activity Management: rolling - L1  FAS: Feeding/Nutrition   Diet order: Diet/Nutrition Received: NPO,   Fluid restriction:    T: Thrombus   DVT prophylaxis: VTE Required Core Measure: Pharmacological prophylaxis initiated/maintained  H: HOB Elevation   Head of Bed (HOB): HOB elevated  U: Ulcer Prophylaxis   GI: no  G: Glucose control   managed Glycemic Management: blood glucose monitoring  S: Skin   Bundle compliance: yes   Bathing/Skin Care: dressed/undressed, bath, chlorhexidine, incontinence care Date: [unfilled]  B: Bowel Function   no issues   I: Indwelling Catheters   Mcmahon necessity:     CVC necessity: No   IPAD offered: No  D: De-escalation Antibx   Yes  Plan for the day   Monitor BP to wean pressors. Monitor for temp.  Family/Goals of care/Code Status   Code Status: Full Code     VS and assessment per flow sheet, patient progressing towards goals as tolerated, plan of care reviewed with Papito Bhakta, all concerns addressed, will continue to monitor.

## 2020-10-08 LAB
ALBUMIN SERPL BCP-MCNC: 2.3 G/DL (ref 3.5–5.2)
ALP SERPL-CCNC: 95 U/L (ref 55–135)
ALT SERPL W/O P-5'-P-CCNC: 22 U/L (ref 10–44)
ANION GAP SERPL CALC-SCNC: 12 MMOL/L (ref 8–16)
AST SERPL-CCNC: 31 U/L (ref 10–40)
BACTERIA UR CULT: ABNORMAL
BASOPHILS # BLD AUTO: 0.05 K/UL (ref 0–0.2)
BASOPHILS NFR BLD: 0.4 % (ref 0–1.9)
BILIRUB SERPL-MCNC: 0.9 MG/DL (ref 0.1–1)
BUN SERPL-MCNC: 43 MG/DL (ref 8–23)
CALCIUM SERPL-MCNC: 9 MG/DL (ref 8.7–10.5)
CHLORIDE SERPL-SCNC: 99 MMOL/L (ref 95–110)
CO2 SERPL-SCNC: 32 MMOL/L (ref 23–29)
CREAT SERPL-MCNC: 1.5 MG/DL (ref 0.5–1.4)
DIFFERENTIAL METHOD: ABNORMAL
EOSINOPHIL # BLD AUTO: 0.2 K/UL (ref 0–0.5)
EOSINOPHIL NFR BLD: 1.5 % (ref 0–8)
ERYTHROCYTE [DISTWIDTH] IN BLOOD BY AUTOMATED COUNT: 16.9 % (ref 11.5–14.5)
EST. GFR  (AFRICAN AMERICAN): 55.7 ML/MIN/1.73 M^2
EST. GFR  (NON AFRICAN AMERICAN): 48.2 ML/MIN/1.73 M^2
GLUCOSE SERPL-MCNC: 100 MG/DL (ref 70–110)
HCT VFR BLD AUTO: 45.2 % (ref 40–54)
HGB BLD-MCNC: 13.1 G/DL (ref 14–18)
IMM GRANULOCYTES # BLD AUTO: 0.05 K/UL (ref 0–0.04)
IMM GRANULOCYTES NFR BLD AUTO: 0.4 % (ref 0–0.5)
LYMPHOCYTES # BLD AUTO: 1.5 K/UL (ref 1–4.8)
LYMPHOCYTES NFR BLD: 12.2 % (ref 18–48)
MAGNESIUM SERPL-MCNC: 2.2 MG/DL (ref 1.6–2.6)
MCH RBC QN AUTO: 22.8 PG (ref 27–31)
MCHC RBC AUTO-ENTMCNC: 29 G/DL (ref 32–36)
MCV RBC AUTO: 79 FL (ref 82–98)
MONOCYTES # BLD AUTO: 1.2 K/UL (ref 0.3–1)
MONOCYTES NFR BLD: 9.5 % (ref 4–15)
NEUTROPHILS # BLD AUTO: 9.2 K/UL (ref 1.8–7.7)
NEUTROPHILS NFR BLD: 76 % (ref 38–73)
NRBC BLD-RTO: 0 /100 WBC
PHOSPHATE SERPL-MCNC: 3.3 MG/DL (ref 2.7–4.5)
PLATELET # BLD AUTO: 162 K/UL (ref 150–350)
PMV BLD AUTO: 12.1 FL (ref 9.2–12.9)
POTASSIUM SERPL-SCNC: 3.8 MMOL/L (ref 3.5–5.1)
PROT SERPL-MCNC: 7.5 G/DL (ref 6–8.4)
RBC # BLD AUTO: 5.75 M/UL (ref 4.6–6.2)
SODIUM SERPL-SCNC: 143 MMOL/L (ref 136–145)
VANCOMYCIN SERPL-MCNC: 20.1 UG/ML
WBC # BLD AUTO: 12.07 K/UL (ref 3.9–12.7)

## 2020-10-08 PROCEDURE — 63600175 PHARM REV CODE 636 W HCPCS: Mod: HCNC | Performed by: INTERNAL MEDICINE

## 2020-10-08 PROCEDURE — 25000003 PHARM REV CODE 250: Mod: HCNC | Performed by: NURSE PRACTITIONER

## 2020-10-08 PROCEDURE — 94660 CPAP INITIATION&MGMT: CPT | Mod: HCNC

## 2020-10-08 PROCEDURE — 63600175 PHARM REV CODE 636 W HCPCS: Mod: HCNC | Performed by: STUDENT IN AN ORGANIZED HEALTH CARE EDUCATION/TRAINING PROGRAM

## 2020-10-08 PROCEDURE — 25000003 PHARM REV CODE 250: Mod: HCNC | Performed by: STUDENT IN AN ORGANIZED HEALTH CARE EDUCATION/TRAINING PROGRAM

## 2020-10-08 PROCEDURE — 97168 OT RE-EVAL EST PLAN CARE: CPT | Mod: HCNC

## 2020-10-08 PROCEDURE — 99233 SBSQ HOSP IP/OBS HIGH 50: CPT | Mod: HCNC,GC,, | Performed by: INTERNAL MEDICINE

## 2020-10-08 PROCEDURE — 85025 COMPLETE CBC W/AUTO DIFF WBC: CPT | Mod: HCNC

## 2020-10-08 PROCEDURE — 63600175 PHARM REV CODE 636 W HCPCS: Mod: HCNC

## 2020-10-08 PROCEDURE — 99900035 HC TECH TIME PER 15 MIN (STAT): Mod: HCNC

## 2020-10-08 PROCEDURE — 80202 ASSAY OF VANCOMYCIN: CPT | Mod: HCNC

## 2020-10-08 PROCEDURE — 99233 PR SUBSEQUENT HOSPITAL CARE,LEVL III: ICD-10-PCS | Mod: HCNC,GC,, | Performed by: INTERNAL MEDICINE

## 2020-10-08 PROCEDURE — 80053 COMPREHEN METABOLIC PANEL: CPT | Mod: HCNC

## 2020-10-08 PROCEDURE — 27000221 HC OXYGEN, UP TO 24 HOURS: Mod: HCNC

## 2020-10-08 PROCEDURE — 97530 THERAPEUTIC ACTIVITIES: CPT | Mod: HCNC

## 2020-10-08 PROCEDURE — 83735 ASSAY OF MAGNESIUM: CPT | Mod: HCNC

## 2020-10-08 PROCEDURE — 94761 N-INVAS EAR/PLS OXIMETRY MLT: CPT | Mod: HCNC

## 2020-10-08 PROCEDURE — 20600001 HC STEP DOWN PRIVATE ROOM: Mod: HCNC

## 2020-10-08 PROCEDURE — 97164 PT RE-EVAL EST PLAN CARE: CPT | Mod: HCNC

## 2020-10-08 PROCEDURE — 97110 THERAPEUTIC EXERCISES: CPT | Mod: HCNC

## 2020-10-08 PROCEDURE — 84100 ASSAY OF PHOSPHORUS: CPT | Mod: HCNC

## 2020-10-08 RX ORDER — CIPROFLOXACIN 500 MG/1
500 TABLET ORAL EVERY 12 HOURS
Status: DISCONTINUED | OUTPATIENT
Start: 2020-10-08 | End: 2020-10-08

## 2020-10-08 RX ORDER — CEFEPIME HYDROCHLORIDE 1 G/1
1 INJECTION, POWDER, FOR SOLUTION INTRAMUSCULAR; INTRAVENOUS
Status: COMPLETED | OUTPATIENT
Start: 2020-10-08 | End: 2020-10-11

## 2020-10-08 RX ORDER — MIDODRINE HYDROCHLORIDE 5 MG/1
5 TABLET ORAL EVERY 8 HOURS
Status: DISCONTINUED | OUTPATIENT
Start: 2020-10-09 | End: 2020-10-09

## 2020-10-08 RX ORDER — MIDODRINE HYDROCHLORIDE 5 MG/1
10 TABLET ORAL 3 TIMES DAILY
Status: COMPLETED | OUTPATIENT
Start: 2020-10-08 | End: 2020-10-08

## 2020-10-08 RX ADMIN — DOCUSATE SODIUM 50MG AND SENNOSIDES 8.6MG 1 TABLET: 8.6; 5 TABLET, FILM COATED ORAL at 09:10

## 2020-10-08 RX ADMIN — GABAPENTIN 100 MG: 100 CAPSULE ORAL at 03:10

## 2020-10-08 RX ADMIN — POLYETHYLENE GLYCOL 3350 17 G: 17 POWDER, FOR SOLUTION ORAL at 09:10

## 2020-10-08 RX ADMIN — MIDODRINE HYDROCHLORIDE 10 MG: 5 TABLET ORAL at 03:10

## 2020-10-08 RX ADMIN — GABAPENTIN 100 MG: 100 CAPSULE ORAL at 09:10

## 2020-10-08 RX ADMIN — CEFEPIME 1 G: 1 INJECTION, POWDER, FOR SOLUTION INTRAMUSCULAR; INTRAVENOUS at 09:10

## 2020-10-08 RX ADMIN — HEPARIN SODIUM 5000 UNITS: 5000 INJECTION INTRAVENOUS; SUBCUTANEOUS at 02:10

## 2020-10-08 RX ADMIN — PRAVASTATIN SODIUM 80 MG: 40 TABLET ORAL at 09:10

## 2020-10-08 RX ADMIN — MIDODRINE HYDROCHLORIDE 10 MG: 5 TABLET ORAL at 09:10

## 2020-10-08 RX ADMIN — CEFEPIME 1 G: 1 INJECTION, POWDER, FOR SOLUTION INTRAMUSCULAR; INTRAVENOUS at 12:10

## 2020-10-08 RX ADMIN — HEPARIN SODIUM 5000 UNITS: 5000 INJECTION INTRAVENOUS; SUBCUTANEOUS at 05:10

## 2020-10-08 RX ADMIN — HEPARIN SODIUM 5000 UNITS: 5000 INJECTION INTRAVENOUS; SUBCUTANEOUS at 09:10

## 2020-10-08 RX ADMIN — PIPERACILLIN SODIUM AND TAZOBACTAM SODIUM 4.5 G: 4; .5 INJECTION, POWDER, LYOPHILIZED, FOR SOLUTION INTRAVENOUS at 05:10

## 2020-10-08 NOTE — PROVIDER TRANSFER
Hospital Medicine ICU Acceptance Note    Date of Admit: 9/28/2020  Date of Transfer / Stepdown: 10/8/2020  Keely, VY/J, L, Onc (IV chemo w/in 1 month), Gyn/Onc, or other special case?: no   ICU team stepping patient down: MICU  ICU team member giving verbal handoff: Tigre Pollock MD  Accepting  team: [facility]    Brief History of Present Illness:      Papito Bhakta is a 65 year old man with non-ischemic cardiomyopathy with AICD and recent cardiac resynchronization therapy with defibrillation 9/27, mixed diastolic and systolic heart failure (EF 18% with grade III diastolic dysfunction 9/30), hyperlipidemia, hypertension who is being admitted to critical care for worsening hypotension. Patient was admitted on 9/29 for shortness of breath and hypoxia due to acute heart failure exacerbation, was followed by heart transplant service. He was briefly placed on BIPAP, now room air. He has been diuresed, initially requiring a lasix drip, and is net -16L since admission. He was planned to transition to PO lasix today and discharge tomorrow.     Patient was found in the morning with tachypnea and accessory muscle use with blood pressure of 90/55 mmHg, MAP 67, and oxygen saturation of 87%. He was placed on 4L oxygen initially, now 3L. He has had worsening mental status. Initially patient, found to have delayed answers now more somnolent. He was given 2x 250mL NS bolus. Urinalysis relevant for many bacteria and 89 WBC. His lactate was 2.7. He now has leukocytosis of 14.2 from 7.3. He was started on piperacillin tazobactam. No history of resistant urinary tract infections. He was found to have penile skin breakdown, per wound care notes. Patient being admitted to critical care in the setting of likely sepsis and requiring vasopressors.      Hospital/ICU Course:     Patient was admitted on 9/29 for shortness of breath and hypoxia due to acute heart failure exacerbation. He was briefly placed on BIPAP, now room air. He  has been diuresed, initially requiring a lasix drip, and is net -16L since admission. He was initally planned to transition to PO lasix 10/6 and discharge 10/7 but patient was found in the morning with tachypnea requiring supplemental oxygen and hypotension unresolved by fluid boluses. Additionally, patient has had worsening mental status during this time. Patient being admitted to critical care in the setting of likely sepsis and requiring vasopressors. Patient briefly required norepinephrine and is now on midodrine. His mental status has improved significantly overnight, now at baseline. Currently on 3L.    Scheduled Meds:   gabapentin  100 mg Oral TID    heparin (porcine)  5,000 Units Subcutaneous Q8H    midodrine  10 mg Oral TID    piperacillin-tazobactam (ZOSYN) IVPB  4.5 g Intravenous Q8H    polyethylene glycol  17 g Oral Daily    pravastatin  80 mg Oral Daily    senna-docusate 8.6-50 mg  1 tablet Oral BID    sodium chloride 0.9%  250 mL Intravenous Once     Continuous Infusions:   norepinephrine bitartrate-D5W Stopped (10/07/20 1145)     PRN Meds:.acetaminophen, acetaminophen, dextrose 50%, dextrose 50%, glucagon (human recombinant), glucose, glucose, melatonin, sodium chloride 0.9%, Pharmacy to dose Vancomycin consult **AND** vancomycin - pharmacy to dose         Acute hypoxemic respiratory failure  Chronic combined systolic and diastolic congestive heart failure  - Patient was initially admitted to hospital medicine on 9/29 for shortness of breath and hypoxia due to acute heart failure exacerbation. Patient with ejection fraction of 18% and grade III diastolic dysfunction on 9/29 ECHO. He was briefly placed on BIPAP, now room air. He has been diuresed, initially requiring a lasix drip, and is net -16L since admission. Initially, he was planned to transition to 80 mg BID PO lasix 10/6 and discharge 10/7.  - given 80 mg lasix once today  - hold antihypertensives and goal directed therapy in the  setting of potential sepsis     Essential hypertension  - Patient on carvedilol, ramipril, and aldactone at home. Switched to metoprolol succinate from carvedilol and entresto since admission. Aldactone held since admission with plan to consider restarting if blood pressure tolerates.  - hold antihypertensives in the setting of sepsis     Acute kidney injury  - Likely prerenal in the setting of hypotension, improved since AM  - continue to trend cmp daily  - avoid nephrotoxic medications and renally dose medications        Severe sepsis  - On 10/6 patient found to be more tachypneic and hypotensive, with waning mental status. He is febrile with a leukocytosis of 14 and lactate of 2.7 in the setting of urinalysis with 89 WBC and many bacteria. Patient with 90/55 mmHg, MAP 67 and given 250 ml NS boluses without improvement. Chest radiographs describe improving congestive heart failure without obvious evidence of infection but patient is requiring supplemental oxygen. Initial ABG, 7.45/50/80. Some evidence of penile skin breakdown. He had several second apneic/hypoventilatory events followed by tachypnea upon admission to ICU. COVID negative.  - Required norepinephrine briefly overnight, now on midodrine. Lactate improved to 0.7.  - continue vancomycin and piperacillin tazobactam  - continue midodrine 15 mg TID  - hold further IV fluids in the setting of heart failure  - hold antihypertensives      Biventricular ICD (implantable cardioverter-defibrillator) in place  - Placed on 9/28 with Dr. Kwong    Consultants and Procedures:     Consultants:  Heart Transplant Service   Wound care    Procedures:    None    Transfer Information:     Diet:  Cardiac, low sodium     Physical Activity:  PT/OT    To Do / Pending Studies / Follow ups:    - off norepinephrine  - continue midodrine  - hold home antihypertensives   - on vancomycin and zosyn   - s/p diuresis  - weaned to room air  - BiPAP HS  - monitor BMP due to RIGOBERTO  (improving)  - resume Entresto when clinically appropriate   - outpatient cardiology followup   - PT/OT recommend SNF. followu with SW     Patient has been accepted by Hospital Medicine Team IMB, who will assume care of the patient upon arrival to the floor from the ICU. Please contact ICU team with any concerns prior to arrival. Please contact Hospital Medicine at [phone] or [phone] (please do NOT leave a voicemail) when patient arrives to the floor.    Brodie Galvan MD  Hospital Medicine Staff  Pager: [phone]; Spectra: [phone]

## 2020-10-08 NOTE — PT/OT/SLP RE-EVAL
Occupational Therapy   Re-evaluation    Name: Papito Bhakta  MRN: [id number]  Admitting Diagnosis:  Acute hypoxemic respiratory failure 10 Days Post-Op  Pt returned to ICU 10/7/2020 with sepsis and vasopressor support needed. New therapy orders in place.   Pt with NICM and s/p AICD placement.   Recommendations:     Discharge Recommendations: nursing facility, skilled      Assessment:     Papito Bhakta is a 65 y.o. male with a medical diagnosis of Acute hypoxemic respiratory failure.  .  Performance deficits affecting function are weakness, impaired self care skills, impaired functional mobilty, impaired endurance, gait instability, impaired balance, decreased upper extremity function.    Pt tolerated session well with good effort and performance. Pt continues to have difficulty with following right UE restrictions. Pt also with decreased MAP with sitting EOB. Pt is not safe for d/c home and to benefit from post acute therapy prior to return home.   Initial goals and POC remain appropriate.   Rehab Prognosis:  Good ; patient would benefit from acute skilled OT services to address these deficits and reach maximum level of function.       Plan:     Patient to be seen 4 x/week to address the above listed problems via self-care/home management, therapeutic activities, therapeutic exercises  · Plan of Care Expires: 11/29/20  · Plan of Care Reviewed with: patient    Subjective     Pt agreeable to therapy     Communicated with: natividad prior to session.  Pain/Comfort:  · Pain Rating 1: 0/10    Objective:     Communicated with: natividad prior to session.  Pt found supine in bed   Co-re-eval completed this date to optimize functional performance given impaired tolerance for activity and acuity of medical status in the ICU.   General Precautions: Standard, fall, pacemaker       Occupational Performance:    Bed Mobility:    Supine>sit with MAX A   Sit>Supine with MAX A     Activities of Daily Living:  · Feeding: set-up  · G/H set-up  seated  · UE dressing: MAX A   · LE dressing: TOTAL A     Cognitive/Visual Perceptual:  Pt awake, alert and following commands.   Physical Exam:  Pt is right hand dominant. R UE strength NT due to recent AICD plaecment.   L UE strength WFL.     [facility] 6 Click:  [facility] Total Score: 8    Treatment & Education:  Pt found to have MAP 66 at rest. MAP goal 65. With sitting EOB MAP decreased to 61 and returned to 72 with return to supine.   Pt tolerated sitting EOB approx 10 min with SBA. Pt denies dizziness but reports not feeling well overall.  R UE sling placed to allow as a reminder for pt to not push with right UE. Pt tends to fully push with right UE with bed mobility skills. Sling did assist well as a reminder for right UE restrictions. Further education and cues provided re: current R UE restrictions.  Education provided re: OT POC and safety with functional mobility/ADl skills.     Patient left supine with all lines intact, call button in reach and nsg notified    GOALS:   Multidisciplinary Problems     Occupational Therapy Goals        Problem: Occupational Therapy Goal    Goal Priority Disciplines Outcome Interventions   Occupational Therapy Goal     OT, PT/OT Ongoing, Progressing    Description: Goals to be met by: 10/14/2020    Patient will increase functional independence with ADLs by performing:    UE Dressing with Supervision.  LE Dressing with Minimal Assistance.  Grooming while standing with Contact Guard Assistance.  Supine to sit with Minimal Assistance.  Toilet transfer to toilet with Minimal Assistance.                     History:     Past Medical History:   Diagnosis Date    RIGOBERTO (acute kidney injury) 10/7/2020    Anticoagulant long-term use     Aspirin    CHF (congestive heart failure)     Hyperlipidemia     Hypertension     NICM (nonischemic cardiomyopathy) 6/16/2020    Obesity        Past Surgical History:   Procedure Laterality Date    INSERTION OF BIVENTRICULAR IMPLANTABLE  CARDIOVERTER-DEFIBRILLATOR (ICD) N/A 2/13/2019    Procedure: INSERTION, ICD, BIVENTRICULAR;  Surgeon: Shailesh Eng MD;  Location: [facility] CATH LAB;  Service: Cardiology;  Laterality: N/A;  RN PRE OP 2-6-19  1ST CASE PER  RADHA. NOTIFIED RADHA THAT ANESTHESIA IS NOT PITO FOR 1ST CASE START-LO    INSERTION OF BIVENTRICULAR IMPLANTABLE CARDIOVERTER-DEFIBRILLATOR (ICD) N/A 9/28/2020    Procedure: INSERTION, ICD, BIVENTRICULAR;  Surgeon: Jim Kwong MD;  Location: [facility] EP LAB;  Service: Cardiology;  Laterality: N/A;  NICM, CRT-D, SJM,, MAC, DM, 3 Prep*Wearing LifeVest*    oral extraction  11/2018    TESTICLE SURGERY         Time Tracking:     OT Date of Treatment: 10/08/20  OT Start Time: 0916  OT Stop Time: 0943  OT Total Time (min): 27 min    Billable Minutes:Re-eval 15  Therapeutic Activity 12    KAELA Regan  10/8/2020

## 2020-10-08 NOTE — PLAN OF CARE
Discharge Recommendation: SNF.    Re-evaluation completed today. PT goals appropriate.    Patient is safe to perform bed level exercises with nursing staff.    Ana Palacios PT, DPT  10/8/2020  Pager: [phone]        Problem: Physical Therapy Goal  Goal: Physical Therapy Goal  Description: Goals to be met by: 10/22/20     Patient will increase functional independence with mobility by performin. Supine to sit with Moderate Assistance.  2. Sit to supine with Moderate Assistance.  3. Sit to stand transfer with Moderate Assistance with LRAD while maintaining Pacemaker precautions.  4. Bed to chair transfer with Moderate Assistance with LRAD while maintaining Pacemaker precautions.  5. Gait  x 10 feet with Moderate Assistance, with LRAD while maintaining Pacemaker precautions.   6. Lower extremity exercise program x 20 reps per handout, with assistance as needed.  7. Pt will recite 3/3 pacemaker precautions and remain compliant with no verbal cueing throughout session    Outcome: Ongoing, Progressing

## 2020-10-08 NOTE — SUBJECTIVE & OBJECTIVE
Past Medical History:   Diagnosis Date    RIGOBERTO (acute kidney injury) 10/7/2020    Anticoagulant long-term use     Aspirin    CHF (congestive heart failure)     Hyperlipidemia     Hypertension     NICM (nonischemic cardiomyopathy) 2020    Obesity        Past Surgical History:   Procedure Laterality Date    INSERTION OF BIVENTRICULAR IMPLANTABLE CARDIOVERTER-DEFIBRILLATOR (ICD) N/A 2019    Procedure: INSERTION, ICD, BIVENTRICULAR;  Surgeon: Shailesh Eng MD;  Location: [facility] CATH LAB;  Service: Cardiology;  Laterality: N/A;  RN PRE OP 2--19  1ST CASE PER  RADHA. NOTIFIED RADHA THAT ANESTHESIA IS NOT PITO FOR 1ST CASE START-LO    INSERTION OF BIVENTRICULAR IMPLANTABLE CARDIOVERTER-DEFIBRILLATOR (ICD) N/A 2020    Procedure: INSERTION, ICD, BIVENTRICULAR;  Surgeon: Jim Kwong MD;  Location: [facility] EP LAB;  Service: Cardiology;  Laterality: N/A;  NICM, CRT-D, SJM,, MAC, DM, 3 Prep*Wearing LifeVest*    oral extraction  2018    TESTICLE SURGERY         Review of patient's allergies indicates:  No Known Allergies    Family History     Problem Relation (Age of Onset)    Epilepsy Mother        Tobacco Use    Smoking status: Former Smoker     Packs/day: 0.50     Years: 40.00     Pack years: 20.00     Types: Cigarettes, Cigars     Quit date:      Years since quittin.7    Smokeless tobacco: Never Used   Substance and Sexual Activity    Alcohol use: Yes     Comment: once a month    Drug use: No    Sexual activity: Yes     Birth control/protection: None     Comment: uses protection sometimes      Review of Systems   Constitutional: Negative for activity change, chills, diaphoresis, fatigue, fever and unexpected weight change (weight gain ).   HENT: Negative for facial swelling and trouble swallowing.    Eyes: Negative for visual disturbance.   Respiratory: Negative for apnea, cough, choking, chest tightness, shortness of breath and wheezing.    Cardiovascular: Positive for leg  swelling. Negative for chest pain and palpitations.   Gastrointestinal: Positive for abdominal distention. Negative for abdominal pain, blood in stool, constipation, diarrhea, nausea and vomiting.   Endocrine: Negative for cold intolerance, heat intolerance and polyuria.   Genitourinary: Negative for dysuria, enuresis, frequency, hematuria and urgency.   Musculoskeletal: Negative for back pain and myalgias.   Skin: Negative for color change, pallor and wound.   Neurological: Negative for dizziness, tremors and weakness.   Hematological: Negative for adenopathy.   Psychiatric/Behavioral: Negative for agitation, behavioral problems and confusion.     Objective:     Vital Signs (Most Recent):  Temp: 99.4 °F (37.4 °C) (10/08/20 1500)  Pulse: 91 (10/08/20 1600)  Resp: 17 (10/08/20 1600)  BP: (!) 121/98 (10/08/20 1600)  SpO2: 100 % (10/08/20 1600) Vital Signs (24h Range):  Temp:  [97.9 °F (36.6 °C)-99.4 °F (37.4 °C)] 99.4 °F (37.4 °C)  Pulse:  [72-91] 91  Resp:  [12-34] 17  SpO2:  [93 %-100 %] 100 %  BP: ()/(51-98) 121/98   Weight: 132.2 kg (291 lb 7.2 oz)  Body mass index is 34.56 kg/m².      Intake/Output Summary (Last 24 hours) at 10/8/2020 1651  Last data filed at 10/8/2020 1501  Gross per 24 hour   Intake 2050 ml   Output 1625 ml   Net 425 ml       Physical Exam  Vitals signs and nursing note reviewed.   Constitutional:       Appearance: Normal appearance. He is obese. He is not ill-appearing, toxic-appearing or diaphoretic.   HENT:      Head: Normocephalic and atraumatic.      Mouth/Throat:      Mouth: Mucous membranes are moist.      Pharynx: No oropharyngeal exudate.   Eyes:      General: No scleral icterus.     Extraocular Movements: Extraocular movements intact.      Conjunctiva/sclera: Conjunctivae normal.      Pupils: Pupils are equal, round, and reactive to light.   Neck:      Musculoskeletal: Normal range of motion and neck supple.   Cardiovascular:      Rate and Rhythm: Normal rate and regular rhythm.       Pulses: Normal pulses.      Heart sounds: No murmur.   Pulmonary:      Breath sounds: No wheezing.      Comments: Patient currently on 3L  Chest:      Chest wall: No tenderness.   Abdominal:      General: Abdomen is flat. Bowel sounds are normal. There is distension.      Tenderness: There is no abdominal tenderness. There is no guarding or rebound.   Lymphadenopathy:      Cervical: No cervical adenopathy.   Skin:     General: Skin is warm and dry.      Capillary Refill: Capillary refill takes less than 2 seconds.      Comments: Wrinkled, firm, and dark skin of lower extremities to the knees   Neurological:      General: No focal deficit present.      Mental Status: He is alert and oriented to person, place, and time.      Comments: Patient at baseline mental status per daughter         Vents:  Oxygen Concentration (%): 24 (10/07/20 2248)  Lines/Drains/Airways     Peripheral Intravenous Line                 Peripheral IV - Single Lumen 10/06/20 1800 20 G Posterior;Right Hand 1 day         Peripheral IV - Single Lumen 10/07/20 0920 20 G;1 3/4 in Right Upper Arm 1 day         Peripheral IV - Single Lumen 10/07/20 0922 20 G Left;Posterior Hand 1 day              Significant Labs:    CBC/Anemia Profile:  Recent Labs   Lab 10/07/20  0409 10/08/20  0323   WBC 20.13* 12.07   HGB 13.7* 13.1*   HCT 46.8 45.2    162   MCV 79* 79*   RDW 17.5* 16.9*        Chemistries:  Recent Labs   Lab 10/07/20  0409 10/07/20  1055 10/08/20  0323    140 143   K 3.9 4.0 3.8   CL 98 98 99   CO2 31* 31* 32*   BUN 48* 43* 43*   CREATININE 2.2* 1.7* 1.5*   CALCIUM 9.5 9.4 9.0   ALBUMIN 2.5* 2.4* 2.3*   PROT 8.0 8.2 7.5   BILITOT 1.6* 1.3* 0.9   ALKPHOS 101 100 95   ALT 23 23 22   AST 33 39 31   MG 2.3 2.4 2.2   PHOS 4.0 3.6 3.3       All pertinent labs within the past 24 hours have been reviewed.    Significant Imaging: I have reviewed and interpreted all pertinent imaging results/findings within the past 24 hours.

## 2020-10-08 NOTE — PLAN OF CARE
Mr. Bhakta is a 65 year old male with PMH of NICM with AICD, HFrEF/HFpEF (EF 18% and g3DD), HLD, and HTN admitted to hospital medicine for acute on chronic heart failure exacerbation and hypoxia. He received IV diuresis and was briefly placed on a furosemide gtt with good urine output (net -16 L) and return to dry weight and was able to be weaned to room air. On 10/6 he had acute worsening of respiratory status, somnolence, and hypotension. Transferred to MICU and required pressor support and broad spectrum antibiotics. UA obtained has grown serratia marcescens and patient switched to cefepime, now on day 1 of course. He has clinically improved and is now being stepped down to hospital medicine.     Plan of Care:  1) Acute hypoxemic respiratory failure likely 2/2 sepsis and acute heart failure exacerbation  -- patient did receive 250cc bolus x 2 during acute decompensation  -- has received 1x 80 IV lasix on 10/7  -- will resume diuresis as tolerated  -- wean O2 as tolerated    2) Hypotension  --history of hypertension, before transfer to unit was on toprol 50, entresto 49/51, and planned to start home aldactone 25  -- now on midodrine 10 TID  -- will wean midodrine as tolerated (5 TID tomorrow)  -- will restart previous regimen as tolerated  -- MAP > 65    3) HFrEF/HFpEF  -- net -16 L during hospital course  -- will need to resume diuresis once blood pressures improve, also patient with new RIGOBERTO  -- plan for lasix 80 BID prior to MICU course + aldactone 25, will resume as tolerated  -- K>4, Mg>2    4) RIGOBERTO, likely 2/2 pre-renal due to hypotension   -- improving  -- monitor CMP  -- will renally dose medications, avoid nephrotoxins    5) Acute complicated cystitis  -- speciated to serratia marcescens  -- in ICU was on vanc/zosyn however zosyn not typical agent for serratia   -- Day 1 of cefepime today  -- will take off cefepime and switch to orals, like FQ, once closer to discharge  -- blood cx: no growth to  death    6) HLD  -- continue home pravastatin

## 2020-10-08 NOTE — PT/OT/SLP RE-EVAL
Physical Therapy Co-Reevaluation and Co-Treatment  Patient Name:  Papito Bhakta   MRN:  [id number]  Admit Date: 9/28/2020  Admitting Diagnosis:  Acute hypoxemic respiratory failure   Length of Stay: 8 days  Recent Surgery: Procedure(s) (LRB):  INSERTION, ICD, BIVENTRICULAR (N/A) 10 Days Post-Op    Hospital Course:  9/28/2020: Admit date  10/1/2020: PT initial evaluation  Pt initially presented to hospital after placement of ICD. After placement pt was unable to be awoken and was found to be volume overloaded and hypoxemic. Due to this pt was admitted to [facility]. On 10/6 pt was found to be hypotensive, tachypneic, and somnolent. Pt was admitted to ICU on this date due to likely sepsis and need of vasopressor support. Patient presents on this date for re-evaluation after ICU transfer.      Please refer to PT initial evaluation for PLOF and social history.  Recommendations:     Discharge Recommendations:  rehabilitation facility   Discharge Equipment Recommendations: other (see comments)(tbd pending progress and discharge dispo)   Barriers to discharge: Decreased caregiver support    Assessment:     Papito Bhakta is a 65 y.o. male admitted with a medical diagnosis of Acute hypoxemic respiratory failure. Pt tolerated evaluation fair on this date. Pt with difficulty maintaining PPM precautions but demonstrates improved compliance with use of sling and swathe. Pt demonstrated good effort and able to tolerate 10 minutes EOB on this date - but does have decreased overall activity tolerance and endurance. Session limited due to pt's low MAP once upright - causing pt to be returned to supine. Pt will benefit from SNF after discharge from acute services in order to continue to progress pt's strength, endurance, and balance to help pt maximize independence at or near PLOF. Pt will continue to benefit from skilled PT services during this hospital admit to continue to improve transfer ability and efficiency as well as continue to  "progress pt's ambulation distance and cardiopulmonary endurance to maximize pt's functional independence and return to PLOF.      Problem List: weakness, impaired endurance, impaired self care skills, impaired functional mobilty, decreased upper extremity function, decreased lower extremity function, decreased safety awareness, impaired cardiopulmonary response to activity  Rehab Prognosis: Good     GOALS:   Multidisciplinary Problems     Physical Therapy Goals        Problem: Physical Therapy Goal    Goal Priority Disciplines Outcome Goal Variances Interventions   Physical Therapy Goal     PT, PT/OT Ongoing, Progressing     Description: Goals to be met by: 10/22/20     Patient will increase functional independence with mobility by performin. Supine to sit with Moderate Assistance.  2. Sit to supine with Moderate Assistance.  3. Sit to stand transfer with Moderate Assistance with LRAD while maintaining Pacemaker precautions.  4. Bed to chair transfer with Moderate Assistance with LRAD while maintaining Pacemaker precautions.  5. Gait  x 10 feet with Moderate Assistance, with LRAD while maintaining Pacemaker precautions.   6. Lower extremity exercise program x 20 reps per handout, with assistance as needed.  7. Pt will recite 3/3 pacemaker precautions and remain compliant with no verbal cueing throughout session                   Plan:     During this hospitalization, patient to be seen 4 x/week to address the above listed problems via gait training, therapeutic activities, therapeutic exercises, neuromuscular re-education  · Plan of Care Expires:  20   Plan of Care Reviewed with: patient    Subjective     RN notified prior to session. No family/friends present upon PT entrance into room.    Chief Complaint: "I've been in bed for a few days now. It really makes you weak"  Patient/Family Comments/goals: get stronger  Pain/Comfort:  · Pain Rating 1: 0/10  · Pain Rating Post-Intervention 1: " 0/10    Objective:     Patient found HOB elevated with: peripheral IV, telemetry, blood pressure cuff, pulse ox (continuous), oxygen     General Precautions: Standard, Cardiac fall, pacemaker   Orthopedic Precautions:N/A   Braces: Sling and swathe   Oxygen Device: Nasal Cannula 3L    Exam:  · Mental Status: Patient is AxOx4 and follows multistep commands. Pt is Alert and Cooperative during session.  · Skin Integrity: Visible skin intact  · Edema: pitting edema of BLE  · Sensation: Intact  · Hearing: Intact  · Vision:  Intact  · Posture: slouched posture and rounded shoulders  · Range of Motion:  · RUE: WFL  · LUE: WFL  · RLE: WFL  · LLE: WFL  · Strength Exam:  Lower Extremity Strength: BLE major muscle groups grossly 4-/5    Outcome Measures:  AM-PAC 6 CLICK MOBILITY  Turning over in bed (including adjusting bedclothes, sheets and blankets)?: 2  Sitting down on and standing up from a chair with arms (e.g., wheelchair, bedside commode, etc.): 2  Moving from lying on back to sitting on the side of the bed?: 2  Moving to and from a bed to a chair (including a wheelchair)?: 2  Need to walk in hospital room?: 1  Climbing 3-5 steps with a railing?: 1  Basic Mobility Total Score: 10     Functional Mobility:  Additional staff present: OT present for co-evaluation with progression to co-treatment. OT for cotx due to pt's multiple deficits req'ing two skilled therapists to appropriately progress pt's musculoskeletal strength and endurance while appropriately facilitating neuromuscular postural balance and control    Bed Mobility:   · Scooting to HOB via supine bridge: contact guard assistance, with side rail and with bed in trendelenburg  · Pt required CGA to stabilize BLE to bridge  · Supine to Sit: maximal assistance and with HOB elevated; from Lt side of bed  · Scooting anteriorly to EOB to have both feet planted on floor: stand by assistance  · Sit to Supine: maximal assistance; to Lt side of bed    Sitting Balance at  Edge of Bed:   Assistance Level Required: Stand-by Assistance   Time: 10 minutes   Postural deviations noted: slouched posture, rounded shoulders and forward head   Comments: Pt able to tolerate 10 minutes EOB with no UE support. Time EOB limited due to pt's drop in MAP from goal of 65. (66 supine at rest, 61 while EOB). Pt with no symptoms and c/o lightheadedness or dizziness. Once supine pt's MAP saray to 72. Patient's time EOB with PT focused on activity tolerance and cardiopulmonary response to continued upright activity while OT performed ADL tasks and reviewed PPM sling and precautions.    Transfers/Gait: deferred due to pt's MAP below goal of 65 while seated EOB.     Education:   Time provided for education, counseling and discussion of health disposition in regards to patient's current status   All questions answered within PT scope of practice and to patient's satisfaction   PT role in POC to address current functional deficits   Pt educated on proper body mechanics, safety techniques, and energy conservation with PT facilitation and cueing throughout session   Call nursing/pct to transfer to chair/use bathroom. Pt stated understanding.    Patient left HOB elevated with all lines intact, call button in reach and RN notified.    Time Tracking:     PT Received On: 10/08/20  PT Start Time: 0916     PT Stop Time: 0941  PT Total Time (min): 25 min     Billable Minutes: Re-eval 10 and Therapeutic Exercise 15    Ana Palacios PT, DPT  10/8/2020  Pager: [phone]

## 2020-10-08 NOTE — ASSESSMENT & PLAN NOTE
On 10/6 patient found to be more tachypneic and hypotensive, with waning mental status. He is febrile with a leukocytosis of 14 and lactate of 2.7 in the setting of urinalysis with 89 WBC and many bacteria. Patient with 90/55 mmHg, MAP 67 and given 250 ml NS boluses without improvement. Chest radiographs describe improving congestive heart failure without obvious evidence of infection but patient is requiring supplemental oxygen. Initial ABG, 7.45/50/80. Some evidence of penile skin breakdown. He had several second apneic/hypoventilatory events followed by tachypnea upon admission to ICU. COVID negative.    Required norepinephrine briefly, now on midodrine. Lactate improved to 0.7. Urine culture grew serratia with intermediate sensitivity to piperacillin tazobactam, otherwise pansensitive.    Plan:  - started cefepime  - wean midodrine 15 mg TID  - hold further IV fluids in the setting of heart failure  - hold antihypertensives

## 2020-10-08 NOTE — RESIDENT HANDOFF
Handoff     Primary Team: [facility] Room Number: [id number]     Patient Name: Papito Bhakta MRN: [id number]     Date of Birth: [id number] Allergies: Patient has no known allergies.     Age: 65 y.o. Admit Date: 9/28/2020     Sex: male  BMI: Body mass index is 34.56 kg/m².     Code Status: Full Code        Illness Level (current clinical status): Watcher - No    Reason for Admission: Acute hypoxemic respiratory failure    Brief HPI (pertinent PMH and diagnosis or differential diagnosis): Papito Bhakta is a 65 year old man with non-ischemic cardiomyopathy with AICD and recent cardiac resynchronization therapy with defibrillation 9/27, mixed diastolic and systolic heart failure (EF 18% with grade III diastolic dysfunction 9/30), hyperlipidemia, hypertension who is being admitted to critical care for worsening hypotension. Patient was admitted on 9/29 to hospital medicine for shortness of breath and hypoxia due to acute heart failure exacerbation. He has been diuresed, initially requiring a lasix drip, and is net -16L since admission.     Patient was found tachypneic with accessory muscle use and blood pressure of 90/55 mmHg, MAP 67, and oxygen saturation of 87%. He was placed on 4L oxygen initially, now 3L. He has had worsening mental status. Initially patient, found to have delayed answers now more somnolent. He was given 2x 250mL NS bolus. Urinalysis relevant for many bacteria and 89 WBC. His lactate was 2.7. He now has leukocytosis of 14.2 from 7.3. He was started on piperacillin tazobactam. No history of resistant urinary tract infections. He was found to have penile skin breakdown, per wound care notes. Patient being admitted to critical care in the setting of likely sepsis and requiring vasopressors.    Hospital Course (updated, brief assessment by system or problem, significant events):     Patient's mental status improved significantly overnight. He required norepinephrine to maintain MAP >65 but  has been weaned off and started on midodrine. Urine culture resulted in serratia, intermediate resistance to piperacillin tazobactam. Started cefepime. He received 80 mg of lasix once while in ICU, have been holding diuresis in the setting of hypotension.    Tasks (specific, using if-then statements):   1. Wean midodrine as tolerated  2. Restart diuresis as tolerated  3. Continue antibiotics    Estimated Discharge Date: 10/11    Discharge Disposition: Skilled Nursing Facility    Mentored By: Dr. Katz

## 2020-10-08 NOTE — PLAN OF CARE
CMICU DAILY GOALS       A: Awake    RASS: Goal - RASS Goal: 0-->alert and calm  Actual - RASS ([place] Agitation-Sedation Scale): -1-->drowsy   Restraint necessity:    B: Breath   SBT: NA   C: Coordinate A & B, analgesics/sedatives   Pain: managed    SAT: NA  D: Delirium   CAM-ICU: Overall CAM-ICU: Negative  E: Early(intubated/ Progressive (non-intubated) Mobility   MOVE Screen:      Activity: Activity Management: rolling - L1  FAS: Feeding/Nutrition   Diet order: Diet/Nutrition Received: low saturated fat/low cholesterol,   Fluid restriction: Fluid Requirement: 1000 FR  T: Thrombus   DVT prophylaxis: VTE Required Core Measure: (SCDs) Sequential compression device initiated/maintained  H: HOB Elevation   Head of Bed (HOB): HOB at 30 degrees  U: Ulcer Prophylaxis   GI: no  G: Glucose control   managed Glycemic Management: blood glucose monitoring  S: Skin   Bundle compliance: mepilex dressings to be applied   Bathing/Skin Care: dressed/undressed, incontinence care, linen changed, back care Date: pm bath   B: Bowel Function   diarrhea   I: Indwelling Catheters   [place]mahon necessity:     CVC necessity: No   IPAD offered: No  D: De-escalation Antibx   See emar and md plan of care   Plan for the day   Pt. Much more alert today. Levo has been weaned off.   Family/Goals of care/Code Status   Code Status: Full Code     No acute events throughout day, VS and assessment per flow sheet, patient progressing towards goals as tolerated, plan of care reviewed with Papito Bhakta and family, all concerns addressed, will continue to monitor.

## 2020-10-08 NOTE — ASSESSMENT & PLAN NOTE
Patient was initially admitted to hospital medicine on 9/29 for shortness of breath and hypoxia due to acute heart failure exacerbation. Patient with ejection fraction of 18% and grade III diastolic dysfunction on 9/29 ECHO. He was briefly placed on BIPAP, now room air. He has been diuresed, initially requiring a lasix drip, and is net -16L since admission. Initially, he was planned to transition to 80 mg BID PO lasix 10/6 and discharge 10/7.    Plan:  - lasix held in the setting of hypotension  - hold antihypertensives and goal directed therapy in the setting of potential sepsis

## 2020-10-08 NOTE — PLAN OF CARE
10/08/20 0935   Discharge Reassessment   Assessment Type Discharge Planning Reassessment   Provided patient/caregiver education on the expected discharge date and the discharge plan Yes   Do you have any problems affording any of your prescribed medications? TBD   Discharge Plan A Home with family   Discharge Plan B Home Health   DME Needed Upon Discharge  other (see comments)  (TBD)   Anticipated Discharge Disposition Home-Health   Post-Acute Status   Post-Acute Authorization Other  (TBD)   Home Health Status Awaiting Internal Medical Clearance   Discharge Delays None known at this time       Yumiko Spann LMSW  [facility]  [phone]

## 2020-10-08 NOTE — NURSING
Patient transferred from CMICU. Upon initial assessment, patient is alert and oriented x4. Patient is currently on 2L NC, and does not appear to be in any distress. Patient does have an open sore on the bottom of 2nd left toe. Patient reports of decreased sensation of the BLE. Patient is re-educated on the 1L fluid restrictions per day, and patient verbalized understanding.

## 2020-10-08 NOTE — PLAN OF CARE
Problem: Occupational Therapy Goal  Goal: Occupational Therapy Goal  Description: Goals to be met by: 10/14/2020    Patient will increase functional independence with ADLs by performing:    UE Dressing with Supervision.  LE Dressing with Minimal Assistance.  Grooming while standing with Contact Guard Assistance.  Supine to sit with Minimal Assistance.  Toilet transfer to toilet with Minimal Assistance.    Outcome: Ongoing, Progressing

## 2020-10-08 NOTE — PROGRESS NOTES
[facility]  Critical Care Medicine  Progress Note    Patient Name: Papito Bhakta  MRN: [id number]  Admission Date: 9/28/2020  Hospital Length of Stay: 8 days  Code Status: Full Code  Attending Provider: Silvia Katz MD  Primary Care Provider: Brynn To MD   Principal Problem: Acute hypoxemic respiratory failure    Subjective:     HPI:  Papito Bhakta is a 65 year old man with non-ischemic cardiomyopathy with AICD and recent cardiac resynchronization therapy with defibrillation 9/27, mixed diastolic and systolic heart failure (EF 18% with grade III diastolic dysfunction 9/30), hyperlipidemia, hypertension who is being admitted to critical care for worsening hypotension. Patient was admitted on 9/29 for shortness of breath and hypoxia due to acute heart failure exacerbation, was followed by heart transplant service. He was briefly placed on BIPAP, now room air. He has been diuresed, initially requiring a lasix drip, and is net -16L since admission. He was planned to transition to PO lasix today and discharge tomorrow.    Patient was found in the morning with tachypnea and accessory muscle use with blood pressure of 90/55 mmHg, MAP 67, and oxygen saturation of 87%. He was placed on 4L oxygen initially, now 3L. He has had worsening mental status. Initially patient, found to have delayed answers now more somnolent. He was given 2x 250mL NS bolus. Urinalysis relevant for many bacteria and 89 WBC. His lactate was 2.7. He now has leukocytosis of 14.2 from 7.3. He was started on piperacillin tazobactam. No history of resistant urinary tract infections. He was found to have penile skin breakdown, per wound care notes. Patient being admitted to critical care in the setting of likely sepsis and requiring vasopressors.     Hospital/ICU Course:  Patient was admitted on 9/29 for shortness of breath and hypoxia due to acute heart failure exacerbation, followed by heart transplant service. He was  briefly placed on BIPAP, now room air. He has been diuresed, initially requiring a lasix drip, and is net -16L since admission. He was initally planned to transition to PO lasix 10/6 and discharge 10/7 but patient was found in the morning with tachypnea requiring supplemental oxygen and hypotension unresolved by fluid boluses. Additionally, patient has had worsening mental status during this time. Patient being admitted to critical care in the setting of likely sepsis and requiring vasopressors. Patient briefly required norepinephrine and is now on midodrine. His mental status has improved significantly overnight, now at baseline. Currently on 3L.    Past Medical History:   Diagnosis Date    RIGOBERTO (acute kidney injury) 10/7/2020    Anticoagulant long-term use     Aspirin    CHF (congestive heart failure)     Hyperlipidemia     Hypertension     NICM (nonischemic cardiomyopathy) 6/16/2020    Obesity        Past Surgical History:   Procedure Laterality Date    INSERTION OF BIVENTRICULAR IMPLANTABLE CARDIOVERTER-DEFIBRILLATOR (ICD) N/A 2/13/2019    Procedure: INSERTION, ICD, BIVENTRICULAR;  Surgeon: Shailesh Eng MD;  Location: [facility] CATH LAB;  Service: Cardiology;  Laterality: N/A;  RN PRE OP 2-6-19  1ST CASE PER  RADHA. NOTIFIED RADHA THAT ANESTHESIA IS NOT PITO FOR 1ST CASE START-LO    INSERTION OF BIVENTRICULAR IMPLANTABLE CARDIOVERTER-DEFIBRILLATOR (ICD) N/A 9/28/2020    Procedure: INSERTION, ICD, BIVENTRICULAR;  Surgeon: Jim Kwong MD;  Location: [facility] EP LAB;  Service: Cardiology;  Laterality: N/A;  NICM, CRT-D, SJM,, MAC, DM, 3 Prep*Wearing LifeVest*    oral extraction  11/2018    TESTICLE SURGERY         Review of patient's allergies indicates:  No Known Allergies    Family History     Problem Relation (Age of Onset)    Epilepsy Mother        Tobacco Use    Smoking status: Former Smoker     Packs/day: 0.50     Years: 40.00     Pack years: 20.00     Types: Cigarettes, Cigars     Quit date: 2014      Years since quittin.7    Smokeless tobacco: Never Used   Substance and Sexual Activity    Alcohol use: Yes     Comment: once a month    Drug use: No    Sexual activity: Yes     Birth control/protection: None     Comment: uses protection sometimes      Review of Systems   Constitutional: Negative for activity change, chills, diaphoresis, fatigue, fever and unexpected weight change (weight gain ).   HENT: Negative for facial swelling and trouble swallowing.    Eyes: Negative for visual disturbance.   Respiratory: Negative for apnea, cough, choking, chest tightness, shortness of breath and wheezing.    Cardiovascular: Positive for leg swelling. Negative for chest pain and palpitations.   Gastrointestinal: Positive for abdominal distention. Negative for abdominal pain, blood in stool, constipation, diarrhea, nausea and vomiting.   Endocrine: Negative for cold intolerance, heat intolerance and polyuria.   Genitourinary: Negative for dysuria, enuresis, frequency, hematuria and urgency.   Musculoskeletal: Negative for back pain and myalgias.   Skin: Negative for color change, pallor and wound.   Neurological: Negative for dizziness, tremors and weakness.   Hematological: Negative for adenopathy.   Psychiatric/Behavioral: Negative for agitation, behavioral problems and confusion.     Objective:     Vital Signs (Most Recent):  Temp: 99.4 °F (37.4 °C) (10/08/20 1500)  Pulse: 91 (10/08/20 1600)  Resp: 17 (10/08/20 1600)  BP: (!) 121/98 (10/08/20 1600)  SpO2: 100 % (10/08/20 1600) Vital Signs (24h Range):  Temp:  [97.9 °F (36.6 °C)-99.4 °F (37.4 °C)] 99.4 °F (37.4 °C)  Pulse:  [72-91] 91  Resp:  [12-34] 17  SpO2:  [93 %-100 %] 100 %  BP: ()/(51-98) 121/98   Weight: 132.2 kg (291 lb 7.2 oz)  Body mass index is 34.56 kg/m².      Intake/Output Summary (Last 24 hours) at 10/8/2020 1651  Last data filed at 10/8/2020 1501  Gross per 24 hour   Intake 2050 ml   Output 1625 ml   Net 425 ml       Physical Exam  Vitals  signs and nursing note reviewed.   Constitutional:       Appearance: Normal appearance. He is obese. He is not ill-appearing, toxic-appearing or diaphoretic.   HENT:      Head: Normocephalic and atraumatic.      Mouth/Throat:      Mouth: Mucous membranes are moist.      Pharynx: No oropharyngeal exudate.   Eyes:      General: No scleral icterus.     Extraocular Movements: Extraocular movements intact.      Conjunctiva/sclera: Conjunctivae normal.      Pupils: Pupils are equal, round, and reactive to light.   Neck:      Musculoskeletal: Normal range of motion and neck supple.   Cardiovascular:      Rate and Rhythm: Normal rate and regular rhythm.      Pulses: Normal pulses.      Heart sounds: No murmur.   Pulmonary:      Breath sounds: No wheezing.      Comments: Patient currently on 3L  Chest:      Chest wall: No tenderness.   Abdominal:      General: Abdomen is flat. Bowel sounds are normal. There is distension.      Tenderness: There is no abdominal tenderness. There is no guarding or rebound.   Lymphadenopathy:      Cervical: No cervical adenopathy.   Skin:     General: Skin is warm and dry.      Capillary Refill: Capillary refill takes less than 2 seconds.      Comments: Wrinkled, firm, and dark skin of lower extremities to the knees   Neurological:      General: No focal deficit present.      Mental Status: He is alert and oriented to person, place, and time.      Comments: Patient at baseline mental status per daughter         Vents:  Oxygen Concentration (%): 24 (10/07/20 2248)  Lines/Drains/Airways     Peripheral Intravenous Line                 Peripheral IV - Single Lumen 10/06/20 1800 20 G Posterior;Right Hand 1 day         Peripheral IV - Single Lumen 10/07/20 0920 20 G;1 3/4 in Right Upper Arm 1 day         Peripheral IV - Single Lumen 10/07/20 0922 20 G Left;Posterior Hand 1 day              Significant Labs:    CBC/Anemia Profile:  Recent Labs   Lab 10/07/20  0409 10/08/20  0323   WBC 20.13* 12.07    HGB 13.7* 13.1*   HCT 46.8 45.2    162   MCV 79* 79*   RDW 17.5* 16.9*        Chemistries:  Recent Labs   Lab 10/07/20  0409 10/07/20  1055 10/08/20  0323    140 143   K 3.9 4.0 3.8   CL 98 98 99   CO2 31* 31* 32*   BUN 48* 43* 43*   CREATININE 2.2* 1.7* 1.5*   CALCIUM 9.5 9.4 9.0   ALBUMIN 2.5* 2.4* 2.3*   PROT 8.0 8.2 7.5   BILITOT 1.6* 1.3* 0.9   ALKPHOS 101 100 95   ALT 23 23 22   AST 33 39 31   MG 2.3 2.4 2.2   PHOS 4.0 3.6 3.3       All pertinent labs within the past 24 hours have been reviewed.    Significant Imaging: I have reviewed and interpreted all pertinent imaging results/findings within the past 24 hours.      ABG  Recent Labs   Lab 10/06/20  1416   PH 7.451*   PO2 80   PCO2 49.6*   HCO3 34.6*   BE 11     Assessment/Plan:     Pulmonary  * Acute hypoxemic respiratory failure  Patient currently on 3L via nasal canula    Plan:  - see chronic combine systolic and diastolic congestive heart failure  - see severe sepsis    Cardiac/Vascular  Biventricular ICD (implantable cardioverter-defibrillator) in place  Placed on 9/28 with Dr. Kwong    Chronic combined systolic and diastolic congestive heart failure  Patient was initially admitted to hospital medicine on 9/29 for shortness of breath and hypoxia due to acute heart failure exacerbation. Patient with ejection fraction of 18% and grade III diastolic dysfunction on 9/29 ECHO. He was briefly placed on BIPAP, now room air. He has been diuresed, initially requiring a lasix drip, and is net -16L since admission. Initially, he was planned to transition to 80 mg BID PO lasix 10/6 and discharge 10/7.    Plan:  - lasix held in the setting of hypotension  - hold antihypertensives and goal directed therapy in the setting of potential sepsis    Essential hypertension  Patient on carvedilol, ramipril, and aldactone at home. Switched to metoprolol succinate from carvedilol and entresto since admission. Aldactone held since admission with plan to consider  restarting if blood pressure tolerates.    Plan:  - hold antihypertensives in the setting of sepsis    Renal/  RIGOBERTO (acute kidney injury)  Likely prerenal in the setting of hypotension, improved to 1.5 and 1.7    Plan:  - continue to trend cmp daily  - avoid nephrotoxic medications and renally dose medications  - maintain MAP >65    ID  Severe sepsis  On 10/6 patient found to be more tachypneic and hypotensive, with waning mental status. He is febrile with a leukocytosis of 14 and lactate of 2.7 in the setting of urinalysis with 89 WBC and many bacteria. Patient with 90/55 mmHg, MAP 67 and given 250 ml NS boluses without improvement. Chest radiographs describe improving congestive heart failure without obvious evidence of infection but patient is requiring supplemental oxygen. Initial ABG, 7.45/50/80. Some evidence of penile skin breakdown. He had several second apneic/hypoventilatory events followed by tachypnea upon admission to ICU. COVID negative.    Required norepinephrine briefly, now on midodrine. Lactate improved to 0.7. Urine culture grew serratia with intermediate sensitivity to piperacillin tazobactam, otherwise pansensitive.    Plan:  - started cefepime  - wean midodrine 15 mg TID  - hold further IV fluids in the setting of heart failure  - hold antihypertensives       Critical Care Daily Checklist:    A: Awake: RASS Goal/Actual Goal: RASS Goal: 0-->alert and calm  Actual: [place] Agitation Sedation Scale (RASS): Alert and calm   B: Spontaneous Breathing Trial Performed?     C: SAT & SBT Coordinated?  N/A                      D: Delirium: CAM-ICU Overall CAM-ICU: Negative   E: Early Mobility Performed? Yes   F: Feeding Goal:    Status:     Current Diet Order   Procedures    Diet Cardiac    Diet Cardiac [facility]; Low Sodium,2gm; Fluid - 1000mL     Order Specific Question:   Indicate patient location for additional diet options:     Answer:   [facility]     Order Specific Question:    Additional Diet Options:     Answer:   Low Sodium,2gm     Order Specific Question:   Fluid restriction:     Answer:   Fluid - 1000mL      AS: Analgesia/Sedation N/A   T: Thromboembolic Prophylaxis Heparin sq   H: HOB > 300 Yes   U: Stress Ulcer Prophylaxis (if needed) N/A   G: Glucose Control Goal 140-180   B: Bowel Function Stool Occurrence: 1   I: Indwelling Catheter (Lines & Mcmahon) Necessity Peripheral left forearm   D: De-escalation of Antimicrobials/Pharmacotherapies Cefepime    Plan for the day/ETD Stepdown    Code Status:  Family/Goals of Care: Full Code       Critical secondary to Patient has a condition that poses threat to life and bodily function: Stepdown today, previously required pressors      Critical care was time spent personally by me on the following activities: development of treatment plan with patient or surrogate and bedside caregivers, discussions with consultants, evaluation of patient's response to treatment, examination of patient, ordering and performing treatments and interventions, ordering and review of laboratory studies, ordering and review of radiographic studies, pulse oximetry, re-evaluation of patient's condition. This critical care time did not overlap with that of any other provider or involve time for any procedures.     Tigre Pollock MD  Critical Care Medicine  [facility]

## 2020-10-08 NOTE — PLAN OF CARE
Pt aao x 3, confused intermittently when off bipap but easy to reorient. All fall and safety precautions in place. Afebrile. Perrla.  Hr paced 80s, map > 65, pulses palpable. NC @ 3L or Bipap qhs. Sats > 98%. BM x 2, incontinent of urine and stool. Full chg bath/linen/gown change with skin care and wound/elpidio care x 2. scds in place. No issues overnight. wctm.      CMICU DAILY GOALS       A: Awake    RASS: Goal - RASS Goal: 0-->alert and calm  Actual - RASS ([place] Agitation-Sedation Scale): 0-->alert and calm   Restraint necessity:    B: Breath   SBT: Not intubated   C: Coordinate A & B, analgesics/sedatives   Pain: managed    SAT: Not intubated  D: Delirium   CAM-ICU: Overall CAM-ICU: Negative  E: Early(intubated/ Progressive (non-intubated) Mobility   MOVE Screen: Pass   Activity: Activity Management: rolling - L1  FAS: Feeding/Nutrition   Diet order: Diet/Nutrition Received: low saturated fat/low cholesterol,   Fluid restriction: Fluid Requirement: 1000 cc fr  T: Thrombus   DVT prophylaxis: VTE Required Core Measure: Pharmacological prophylaxis initiated/maintained  SCD pumps in place  H: HOB Elevation   Head of Bed (HOB): HOB elevated  U: Ulcer Prophylaxis   GI: no  G: Glucose control   managed Glycemic Management: blood glucose monitoring  S: Skin   Bundle compliance: yes   Bathing/Skin Care: bath, chlorhexidine, dressed/undressed, incontinence care, linen changed, bedtime care Date: [unfilled] 10/7/20 full chg/linen/gown change done pm shift  B: Bowel Function   no issues   I: Indwelling Catheters   Mcmahon necessity:     CVC necessity: No   IPAD offered: No  D: De-escalation Antibx   Yes  Plan for the day   Monitor VS, Neuro status and labs. PT/OT.  Family/Goals of care/Code Status   Code Status: Full Code     VS and assessment per flow sheet, patient progressing towards goals as tolerated, plan of care reviewed with Papito Bhakta, all concerns addressed, will continue to monitor.

## 2020-10-09 LAB
ALBUMIN SERPL BCP-MCNC: 2.3 G/DL (ref 3.5–5.2)
ALP SERPL-CCNC: 114 U/L (ref 55–135)
ALT SERPL W/O P-5'-P-CCNC: 26 U/L (ref 10–44)
ANION GAP SERPL CALC-SCNC: 11 MMOL/L (ref 8–16)
AST SERPL-CCNC: 39 U/L (ref 10–40)
BASOPHILS # BLD AUTO: 0.04 K/UL (ref 0–0.2)
BASOPHILS NFR BLD: 0.5 % (ref 0–1.9)
BILIRUB SERPL-MCNC: 0.8 MG/DL (ref 0.1–1)
BUN SERPL-MCNC: 33 MG/DL (ref 8–23)
CALCIUM SERPL-MCNC: 9.1 MG/DL (ref 8.7–10.5)
CHLORIDE SERPL-SCNC: 99 MMOL/L (ref 95–110)
CO2 SERPL-SCNC: 31 MMOL/L (ref 23–29)
CREAT SERPL-MCNC: 1.2 MG/DL (ref 0.5–1.4)
DIFFERENTIAL METHOD: ABNORMAL
EOSINOPHIL # BLD AUTO: 0.1 K/UL (ref 0–0.5)
EOSINOPHIL NFR BLD: 1.9 % (ref 0–8)
ERYTHROCYTE [DISTWIDTH] IN BLOOD BY AUTOMATED COUNT: 16.6 % (ref 11.5–14.5)
EST. GFR  (AFRICAN AMERICAN): >60 ML/MIN/1.73 M^2
EST. GFR  (NON AFRICAN AMERICAN): >60 ML/MIN/1.73 M^2
GLUCOSE SERPL-MCNC: 93 MG/DL (ref 70–110)
HCT VFR BLD AUTO: 44.4 % (ref 40–54)
HGB BLD-MCNC: 12.4 G/DL (ref 14–18)
IMM GRANULOCYTES # BLD AUTO: 0.02 K/UL (ref 0–0.04)
IMM GRANULOCYTES NFR BLD AUTO: 0.3 % (ref 0–0.5)
LYMPHOCYTES # BLD AUTO: 1.6 K/UL (ref 1–4.8)
LYMPHOCYTES NFR BLD: 22.3 % (ref 18–48)
MAGNESIUM SERPL-MCNC: 2.2 MG/DL (ref 1.6–2.6)
MCH RBC QN AUTO: 22.4 PG (ref 27–31)
MCHC RBC AUTO-ENTMCNC: 27.9 G/DL (ref 32–36)
MCV RBC AUTO: 80 FL (ref 82–98)
MONOCYTES # BLD AUTO: 1.3 K/UL (ref 0.3–1)
MONOCYTES NFR BLD: 17.3 % (ref 4–15)
NEUTROPHILS # BLD AUTO: 4.2 K/UL (ref 1.8–7.7)
NEUTROPHILS NFR BLD: 57.7 % (ref 38–73)
NRBC BLD-RTO: 0 /100 WBC
PHOSPHATE SERPL-MCNC: 3.2 MG/DL (ref 2.7–4.5)
PLATELET # BLD AUTO: 168 K/UL (ref 150–350)
PMV BLD AUTO: 13 FL (ref 9.2–12.9)
POTASSIUM SERPL-SCNC: 3.8 MMOL/L (ref 3.5–5.1)
PROT SERPL-MCNC: 7.8 G/DL (ref 6–8.4)
RBC # BLD AUTO: 5.53 M/UL (ref 4.6–6.2)
SODIUM SERPL-SCNC: 141 MMOL/L (ref 136–145)
WBC # BLD AUTO: 7.35 K/UL (ref 3.9–12.7)

## 2020-10-09 PROCEDURE — 80053 COMPREHEN METABOLIC PANEL: CPT | Mod: HCNC

## 2020-10-09 PROCEDURE — 94761 N-INVAS EAR/PLS OXIMETRY MLT: CPT | Mod: HCNC

## 2020-10-09 PROCEDURE — 99233 PR SUBSEQUENT HOSPITAL CARE,LEVL III: ICD-10-PCS | Mod: HCNC,GC,, | Performed by: INTERNAL MEDICINE

## 2020-10-09 PROCEDURE — 83735 ASSAY OF MAGNESIUM: CPT | Mod: HCNC

## 2020-10-09 PROCEDURE — 25000003 PHARM REV CODE 250: Mod: HCNC | Performed by: STUDENT IN AN ORGANIZED HEALTH CARE EDUCATION/TRAINING PROGRAM

## 2020-10-09 PROCEDURE — 63600175 PHARM REV CODE 636 W HCPCS: Mod: HCNC

## 2020-10-09 PROCEDURE — 85025 COMPLETE CBC W/AUTO DIFF WBC: CPT | Mod: HCNC

## 2020-10-09 PROCEDURE — 25000003 PHARM REV CODE 250: Mod: HCNC | Performed by: NURSE PRACTITIONER

## 2020-10-09 PROCEDURE — 99900035 HC TECH TIME PER 15 MIN (STAT): Mod: HCNC

## 2020-10-09 PROCEDURE — 84100 ASSAY OF PHOSPHORUS: CPT | Mod: HCNC

## 2020-10-09 PROCEDURE — 94799 UNLISTED PULMONARY SVC/PX: CPT | Mod: HCNC

## 2020-10-09 PROCEDURE — 27000221 HC OXYGEN, UP TO 24 HOURS: Mod: HCNC

## 2020-10-09 PROCEDURE — 36415 COLL VENOUS BLD VENIPUNCTURE: CPT | Mod: HCNC

## 2020-10-09 PROCEDURE — 63600175 PHARM REV CODE 636 W HCPCS: Mod: HCNC | Performed by: STUDENT IN AN ORGANIZED HEALTH CARE EDUCATION/TRAINING PROGRAM

## 2020-10-09 PROCEDURE — 99233 SBSQ HOSP IP/OBS HIGH 50: CPT | Mod: HCNC,GC,, | Performed by: INTERNAL MEDICINE

## 2020-10-09 PROCEDURE — 94660 CPAP INITIATION&MGMT: CPT | Mod: HCNC

## 2020-10-09 PROCEDURE — 63600175 PHARM REV CODE 636 W HCPCS: Mod: HCNC | Performed by: INTERNAL MEDICINE

## 2020-10-09 PROCEDURE — 20600001 HC STEP DOWN PRIVATE ROOM: Mod: HCNC

## 2020-10-09 RX ORDER — POTASSIUM CHLORIDE 20 MEQ/1
40 TABLET, EXTENDED RELEASE ORAL ONCE
Status: COMPLETED | OUTPATIENT
Start: 2020-10-09 | End: 2020-10-09

## 2020-10-09 RX ORDER — MIDODRINE HYDROCHLORIDE 5 MG/1
5 TABLET ORAL EVERY 8 HOURS
Status: COMPLETED | OUTPATIENT
Start: 2020-10-09 | End: 2020-10-09

## 2020-10-09 RX ORDER — METOPROLOL SUCCINATE 50 MG/1
50 TABLET, EXTENDED RELEASE ORAL DAILY
Status: DISCONTINUED | OUTPATIENT
Start: 2020-10-09 | End: 2020-10-14

## 2020-10-09 RX ORDER — ENOXAPARIN SODIUM 100 MG/ML
40 INJECTION SUBCUTANEOUS EVERY 24 HOURS
Status: DISCONTINUED | OUTPATIENT
Start: 2020-10-09 | End: 2020-10-15 | Stop reason: HOSPADM

## 2020-10-09 RX ORDER — FUROSEMIDE 80 MG/1
80 TABLET ORAL 2 TIMES DAILY
Status: DISCONTINUED | OUTPATIENT
Start: 2020-10-09 | End: 2020-10-15

## 2020-10-09 RX ADMIN — POLYETHYLENE GLYCOL 3350 17 G: 17 POWDER, FOR SOLUTION ORAL at 08:10

## 2020-10-09 RX ADMIN — MIDODRINE HYDROCHLORIDE 5 MG: 5 TABLET ORAL at 02:10

## 2020-10-09 RX ADMIN — CEFEPIME 1 G: 1 INJECTION, POWDER, FOR SOLUTION INTRAMUSCULAR; INTRAVENOUS at 12:10

## 2020-10-09 RX ADMIN — HEPARIN SODIUM 5000 UNITS: 5000 INJECTION INTRAVENOUS; SUBCUTANEOUS at 05:10

## 2020-10-09 RX ADMIN — POTASSIUM CHLORIDE 40 MEQ: 1500 TABLET, EXTENDED RELEASE ORAL at 08:10

## 2020-10-09 RX ADMIN — CEFEPIME 1 G: 1 INJECTION, POWDER, FOR SOLUTION INTRAMUSCULAR; INTRAVENOUS at 05:10

## 2020-10-09 RX ADMIN — MIDODRINE HYDROCHLORIDE 5 MG: 5 TABLET ORAL at 05:10

## 2020-10-09 RX ADMIN — GABAPENTIN 100 MG: 100 CAPSULE ORAL at 09:10

## 2020-10-09 RX ADMIN — GABAPENTIN 100 MG: 100 CAPSULE ORAL at 08:10

## 2020-10-09 RX ADMIN — DOCUSATE SODIUM 50MG AND SENNOSIDES 8.6MG 1 TABLET: 8.6; 5 TABLET, FILM COATED ORAL at 09:10

## 2020-10-09 RX ADMIN — METOPROLOL SUCCINATE 50 MG: 50 TABLET, EXTENDED RELEASE ORAL at 12:10

## 2020-10-09 RX ADMIN — GABAPENTIN 100 MG: 100 CAPSULE ORAL at 03:10

## 2020-10-09 RX ADMIN — DOCUSATE SODIUM 50MG AND SENNOSIDES 8.6MG 1 TABLET: 8.6; 5 TABLET, FILM COATED ORAL at 08:10

## 2020-10-09 RX ADMIN — FUROSEMIDE 80 MG: 80 TABLET ORAL at 06:10

## 2020-10-09 RX ADMIN — CEFEPIME 1 G: 1 INJECTION, POWDER, FOR SOLUTION INTRAMUSCULAR; INTRAVENOUS at 09:10

## 2020-10-09 RX ADMIN — MIDODRINE HYDROCHLORIDE 5 MG: 5 TABLET ORAL at 09:10

## 2020-10-09 RX ADMIN — PRAVASTATIN SODIUM 80 MG: 40 TABLET ORAL at 08:10

## 2020-10-09 RX ADMIN — ENOXAPARIN SODIUM 40 MG: 40 INJECTION SUBCUTANEOUS at 04:10

## 2020-10-09 NOTE — PLAN OF CARE
No acute changes overnight. Pt slept with Bipap on, otherwise on 3L nasal cannula. Call light in reach. TM

## 2020-10-09 NOTE — ASSESSMENT & PLAN NOTE
Non-O2 dependent with need for BiPAP following bi-V ICD placement. Subsequently weaned to RA with diuresis. 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 CXR with pulmonary edema, pleural effusions, and cardiomegaly. Received 2 doses of 60 mg IV lasix s/p procedure.  DDX: Acute heart failure exacerbation  - lasix gtt 10/2, dc 10/3  - improving - euvolemic on exam  - net negative 16 L since admit  - HTS consultation per EP recs, have signed off  - was put back on oxygen during MICU course, weaning off on floor  - BNP down to 200s (500s on admit)    Plan:  - restart 80 mg lasix PO today   - restart toprol 50 mg today  - holding entresto 49-51 while on midodrine  - weaning off midodrine (currently on 5 mg TID, will dc 10/10)  - hold aldactone, consider restarting tomorrow   - K>4, Mg >2 - replete as needed  - goal O2 > 93%

## 2020-10-09 NOTE — MEDICAL/APP STUDENT
[facility] Hospital Medicine  Progress Note     Patient Name: Papito Bhakta  MRN: [id number]  Patient Class: IP- Inpatient     Admission Date: 9/28/2020  Length of Stay: 9 days  Attending Physician: Skye Inman MD  Primary Care Provider: Brynn To MD     [facility] Medicine Team: [facility] 3 Randall Diaz: Med student     Subjective:      Principal Problem: Acute hypoxemic respiratory failure           HPI:  Mr. Bhakta is a 64 yo gentleman with PMH of NICM with AICD and recent R CRT-D placement 9/27, hypertension, HFpEF/HFrEF (EF 25% June 2020), hyperlipidemia, and obesity presenting for shortness of breath and hypoxia. On September 27 he underwent placement of cardiac resynchronization therapy pacemaker with Dr. Kwong. After procedure it was difficult to awaken patient. His pH was 7.26 and CO2 was elevated, and he was subsequently placed on BiPAP then weaned down to room air after diuresis. It was recommended he be admitted for volume overload in the setting of hypoxemia. He has been experiencing increased shortness of breath and lower extremity swelling for the past few months since his bi-V ICD was removed in June 2020 secondary to infection. He reports 2 pillow orthopnea, dyspnea ambulating from his room to the kitchen, and a 20# weight gain. Previously he was more active and able to do household chores and yard work. He has been compliant with his home medications and has been alternating between taking 80 mg of lasix once to twice daily though he was prescribed daily on discharge from the hospital in June. He denies chest pain, fevers, chills, weight loss, nausea, vomiting, diarrhea, melena/hematochezia, and dysuria.      ED/Post-Op Course:  On room air. CXR showed cardiomegaly, pulmonary edema, and pleural effusion. Received 60 mg IV lasix x2 with good urine output and improved respiratory status. CMP revealed increased CO2.      Overview/Hospital Course:  Patient initially  admitted to the hospital following BiVICD replacement on 9/28 for acute hypoxemic respiratory failure secondary to acute on chronic heart failure exacerbation requiring Lasix gtt plus IV push for several days. Over the course of stay, the patient's clinical status improved with continuous diuresis, returning close to baseline. CXR with cardiomegaly, cephalization, pleural effusions, and congestion. Repeat EF 18%, G3DD, PAP 50s. Subsequently, the patient became tachypnic and hypotensive with leukocytosis of 14 likely secondary to Urosepsis with Serratia infection. Was given 250 mL IV fluid bolus x2, started on Piptaz and Vanc(now switched to Cefepime) and admitted to the MICU. He was then treated with Norepinephrine for a brief period of time, was weaned off and started on Midadrine. The patient was then stepped down back to the floor under our care on 3 L O2 nasal cannula, with lasix and antihypertensive medications on hold in setting of sepsis and hypotension.    Interval History:   No acute events over night. Patients states he feels somewhat improved today, resting comfortably in bed. Only complaint is throat dryness from Bipap overnight. States he coughs up somewhat yellow tinged sputum in the morning when clearing his throat, but denies any fever, chills, cough, SOB, CP, dizziness, light headedness, abdominal pain, N/V. Patient still not ambulatory at this time.                Review of Systems   Constitutional: Negative for chills and fever.   HENT: Positive for sore throat (dryness in morning with production of yellow sputum when clearing). Negative for trouble swallowing.    Eyes: Negative for visual disturbance.   Respiratory: Positive for cough. Negative for chest tightness and shortness of breath.    Gastrointestinal: Negative for abdominal pain, nausea and vomiting.   Genitourinary: Negative for difficulty urinating.   Musculoskeletal: Negative for back pain and neck pain.   Integumentary:  Negative for  rash.   Neurological: Negative for dizziness and light-headedness.   Psychiatric/Behavioral: Negative for confusion.         Objective:         Vital Signs (Most Recent):  Temp: (!) 101.9 °F (38.8 °C) (10/06/20 1718)  Pulse: 89 (10/06/20 1600)  Resp: (!) 44 (10/06/20 1600)  BP: (!) 95/46 (10/06/20 1600)  SpO2: (!) 93 % (10/06/20 1600) Vital Signs (24h Range):  Temp:  [97.7 °F (36.5 °C)-101.9 °F (38.8 °C)] 101.9 °F (38.8 °C)  Pulse:  [] 89  Resp:  [14-44] 44  SpO2:  [84 %-99 %] 93 %  BP: ()/(44-81) 95/46      Weight: 134 kg (295 lb 6.7 oz)  Body mass index is 35.03 kg/m².     Intake/Output Summary (Last 24 hours) at 10/6/2020 1752  Last data filed at 10/5/2020 2000      Gross per 24 hour   Intake 240 ml   Output --   Net 240 ml         Physical Exam  Constitutional:       General: He is not in acute distress.     Appearance: Normal appearance. He is obese. He is not diaphoretic.   HENT:      Head: Normocephalic and atraumatic.      Nose: Nose normal.      Mouth/Throat:      Mouth: Mucous membranes are moist.   Eyes:      Extraocular Movements: Extraocular movements intact.      Conjunctiva/sclera: Conjunctivae normal.      Pupils: Pupils are equal, round, and reactive to light.   Neck:      Musculoskeletal: Normal range of motion and neck supple. No muscular tenderness.   Cardiovascular:      Rate and Rhythm: Normal rate and regular rhythm.      Pulses: Normal pulses.      Heart sounds: Normal heart sounds.   Pulmonary:      Effort: Pulmonary effort is normal. No respiratory distress.      Breath sounds: No wheezing.   Abdominal:      General: Bowel sounds are normal. There is distension (mild).      Tenderness: There is no abdominal tenderness. There is no guarding or rebound.   Musculoskeletal: Normal range of motion.         General: Swelling (to bilateral LEs, much improved with chronic stasis dermatitis) present.   Lymphadenopathy:      Cervical: No cervical adenopathy.   Skin:     General: Skin is  warm and dry.      Capillary Refill: Capillary refill takes less than 2 seconds.      Findings: No rash.   Neurological:      General: No focal deficit present.      Mental Status: He is alert and oriented to person, place, and time. Mental status is at baseline.      Coordination: Coordination normal.   Psychiatric:         Mood and Affect: Mood normal.         Behavior: Behavior normal.         Thought Content: Thought content normal.                Significant Labs: All pertinent labs within the past 24 hours have been reviewed.     Significant Imaging: I have reviewed all pertinent imaging results/findings within the past 24 hours.        Assessment/Plan:      Acute hypoxemic respiratory failure in setting of acute on chronic combined HF(9/30 EF 18%)  -Pt continuing to improve on exam. No further wheezing auscultated. Bilateral LE edema significantly improved.   -Lasix currently on hold secondary to acquired urosepsis and hypotension, plan to restart on PO lasix 80 mg today  -Antihypertensives on hold for same reasons, plan to restart with Metoprolol succinate 50 today. Continue to hold Entresto and Aldactone at this time.   -Continue to wean Midodrine  - Currently off O2 requirement, sating well   - Continue to hold IV fluids as much as possible.   -Keep K > 4 and Mg > 2   -goal O2 sat >93%    Severe Sepsis  -Patient remains afebrile with white count down to 7.35 from 12.4 yesterday, will repeat CBC tomorrow morning.   -BP stable at 118/67, trending upwards. Will continue to monitor, and wean Midodrine currently at 5 mg q8 hours.  -Continue Cefepime for urosepsis treatment, urine cultures positive for serratia. Will discharge on PO Abx  -Continue to hold IV fluids as much as possible       Chronic combined systolic and diastolic congestive heart failure  History of combined systolic and diastolic dysfunction, s/p CRT-D 9/28 with continued volume overload. Has AICD. Compliant with home medications.     - Please  see Acute Hypoxemic Respiratory Failure        RIGOBERTO (acute kidney injury)  RIGOBERTO during MICU course, likely pre-renal in nature secondary to hypotension in setting of urosepsis. BUN down to 33 from 43 yesterday, and Cr down to 1.2 from 1.5 yesterday. Given resolution will restart PO lasix today with repeat CMP tomorrow morning.         Essential hypertension  Home meds: carvedilol 12.5 BID, ramipiril, aldactone  - required brief pressor support with norepinephrine and then midodrine in ICU  - Hold entresto and aldactone at this time  - Continue to wean midodrine  - Restarting Metoprolol succinate 50 today        Hyperlipidemia  Continue home pravastatin 80 mg     NICM (nonischemic cardiomyopathy)  Please see Acute Hypoxemic Respiratory Failure        Biventricular ICD (implantable cardioverter-defibrillator) in place  Placed 9/28 with Dr. Kwong.     - completed prophylactic keflex dose           VTE Risk Mitigation (From admission, onward)                  Ordered        heparin (porcine) injection 5,000 Units  Every 8 hours      10/06/20 1410        IP VTE HIGH RISK PATIENT  Once      09/29/20 1417        Place sequential compression device  Until discontinued      09/29/20 1417                      Discharge planning:   Expected discharge date: 10/10/20. Will continue to monitor blood pressure, recheck CBC, CMP in the morning, and make sure patient remains hemodynamically stable without change in mentation. Will re-evaluate tomorrow morning. Spoke with the patient's daughter, she seems more willing to have the patient proceed with inpatient rehab on a temporary basis. Unsure of what the brother and patient want at this time.

## 2020-10-09 NOTE — PHYSICIAN QUERY
PT Name: Papito Bhakta  MR #: [id number]     Perfusion Diagnosis Clarification     CDS/: Triston Mendez Jr, RN               Contact information:[email or web address]  This form is a permanent document in the medical record.     Query Date: October 9, 2020    By submitting this query, we are merely seeking further clarification of documentation.  Please utilize your independent clinical judgment when addressing the question(s) below.  The Medical Record contains the following:  Indicators Supporting Clinical Findings Location in Medical Record   x Acute Illness (e.g. AMI, Sepsis, etc.) Severe sepsis     Current decompensation could be related to underlying   cardiogenic shock but likely sepsis    Acute hypoxemic respiratory failure likely 2/2 sepsis and acute heart failure exacerbation   10/6 Hospital Medicine Progress Note    10/6 H&P        10/8 Hospital Medicine Plan of Care Note   x Vital Signs  BP=72/44-->82/52-->80/55-->75/54   10/6-10/8 VS Flowsheet     Acidosis documented     x ABGs/Labs Infectious work up with leukocytosis, lactic acidosis, and dirty UA   10/6 Hospital Medicine Progress Note   x Hypotension or Low Blood Pressure documented BP 90/55 MAP 67      Also hypotensive, may require pressors due to his CHF   10/6 Nursing Progress Note      10/6 Hospital Medicine Progress Note   x Altered Mental Status or Confusion RN called to bedside 2* increased WOB, increasing lethargy, decreased responses   PT/OT Progress Note    Diaphoresis, Cold Extremities or Cyanosis      Oliguria     x Medication/Treatment:  -Vasopressors  -Inotropic Drugs  -IV Fluids   -IV Antibiotics  -Cardiac Assist Devices  -Hemodynamic Monitoring  -Blood/Blood Products Patient being admitted to critical care in the setting of likely sepsis and requiring vasopressors    patient did receive 250cc bolus x 2 during acute decompensation    will need to resume diuresis once blood pressures improve, also patient with new  RIGOBERTO    norepinephrine 4 mg mL infusion     piperacillin-tazobactam 4.5 g  IVPB  10/7 Critical Care Progress note        10/8 Hospital Medicine Plan of care Note    10/8 Hospital Medicine Plan of care Note      10/6-10/8 MAR    10/6-10/8 MAR      Other       Provider, please specify diagnosis or diagnoses associated with above clinical findings.  Specify the Type of Shock    [ X   ] Septic Shock     [    ] Cardiogenic Shock     [    ] Cardiogenic and Septic Shock     [    ] Other Condition (please specify): _________     [   ] Clinically Undetermined       Please document in your progress notes daily for the duration of treatment until resolved and include in your discharge summary.

## 2020-10-09 NOTE — PLAN OF CARE
Recommendations     1.) Continue cardiac diet with fluid restriction per MD recommendation.     Goals: 1.) Pt to meet >75% of estimated energy and protein needs over the course of 7 days.  Nutrition Goal Status: new  Communication of RD Recs: (POC)

## 2020-10-09 NOTE — ASSESSMENT & PLAN NOTE
Home meds: carvedilol 12.5 BID, ramipiril, aldactone  - required pressor support and then midodrine in ICU, all bp meds held  - entresto 49-51 held for now  - wean off midodrine  - hold aldactone   - restart toprol

## 2020-10-09 NOTE — PROGRESS NOTES
"[facility]  Adult Nutrition  Progress Note    SUMMARY       Recommendations    1.) Continue cardiac diet with fluid restriction per MD recommendation.    Goals: 1.) Pt to meet >75% of estimated energy and protein needs over the course of 7 days.  Nutrition Goal Status: new  Communication of RD Recs: (POC)    Reason for Assessment    Reason For Assessment: length of stay  Diagnosis: (Acute hypoxemic respiratory failure)  Relevant Medical History: CHF< hyperlipidemia, HTN< obesity, RIGOBERTO  Interdisciplinary Rounds: did not attend  General Information Comments: Pt resting during RD visit. Pt appears well nourished, NFPE not warranted at this time. Per nsg, pt consuming % of meals. GI- LBM 10/8.  Nutrition Discharge Planning: Pt to discharge on cardiac diet with fluid restriciton.    Nutrition Risk Screen    Nutrition Risk Screen: no indicators present    Nutrition/Diet History    Spiritual, Cultural Beliefs, Zoroastrian Practices, Values that Affect Care: no  Food Allergies: NKFA  Factors Affecting Nutritional Intake: None identified at this time    Anthropometrics    Temp: 98.1 °F (36.7 °C)  Height Method: Stated  Height: 6' 5" (195.6 cm)  Height (inches): 77 in  Weight Method: Bed Scale  Weight: 134 kg (295 lb 6.7 oz)  Weight (lb): 295.42 lb  Ideal Body Weight (IBW), Male: 208 lb  % Ideal Body Weight, Male (lb): 168.75 %  BMI (Calculated): 35  BMI Grade: 35 - 39.9 - obesity - grade II       Lab/Procedures/Meds    Pertinent Labs Reviewed: reviewed  Pertinent Labs Comments: BUN 33, Alb 2.3  Pertinent Medications Reviewed: reviewed  Pertinent Medications Comments: Cefepime, heparin, polyethylene glycol, senna-docusate    Physical Findings/Assessment     Edema 2+ generalized  Skin wound left anterior foot diabetic foot ulcer; wound bottom    Estimated/Assessed Needs    Weight Used For Calorie Calculations: 134 kg (295 lb 6.7 oz)  Energy Calorie Requirements (kcal): 2690  Energy Need Method: " [facility](x1.2 PAL)  Protein Requirements: 107gm (0.8gm/kg)  Weight Used For Protein Calculations: 134 kg (295 lb 6.7 oz)  Fluid Requirements (mL): 1mL/kcal or per MD recommendations  Estimated Fluid Requirement Method: RDA Method  RDA Method (mL): 2690         Nutrition Prescription Ordered    Current Diet Order: cardaic, low sodium  Nutrition Order Comments: 1000mL fluid restriction    Evaluation of Received Nutrient/Fluid Intake    I/O: -16.2L since admisison  Tolerance: tolerating  % Intake of Estimated Energy Needs: 81  % Meal Intake: 100    Nutrition Risk    Level of Risk/Frequency of Follow-up: low , 1x weekly    Assessment and Plan    No new Assessment & Plan notes have been filed under this hospital service since the last note was generated.  Service: Nutrition       Monitor and Evaluation    Food and Nutrient Intake: energy intake, food and beverage intake  Food and Nutrient Adminstration: diet order  Knowledge/Beliefs/Attitudes: food and nutrition knowledge/skill  Anthropometric Measurements: weight, weight change  Biochemical Data, Medical Tests and Procedures: electrolyte and renal panel, gastrointestinal profile  Nutrition-Focused Physical Findings: overall appearance, skin       Nutrition Follow-Up    RD Follow-up?: Yes

## 2020-10-09 NOTE — ASSESSMENT & PLAN NOTE
RIGOBERTO during MICU course, likely 2/2 hypotension. Resolved    - given resolution of RIGOBERTO will restart lasix  - CMP daily

## 2020-10-09 NOTE — PROGRESS NOTES
[facility] Hospital Medicine  Progress Note    Patient Name: Papito Bhakta  MRN: [id number]  Patient Class: IP- Inpatient   Admission Date: 9/28/2020  Length of Stay: 9 days  Attending Physician: Skye Inman MD  Primary Care Provider: Brynn To MD    Hospital Medicine Team: [facility] [place] HOSP MED 3 Jennifer Ledezma MD    Subjective:     Principal Problem:Acute hypoxemic respiratory failure        HPI:  Mr. Bhakta is a 64 yo gentleman with PMH of NICM with AICD and recent R CRT-D placement 9/27, hypertension, HFpEF/HFrEF (EF 25% June 2020), hyperlipidemia, and obesity presenting for shortness of breath and hypoxia. On September 27 he underwent placement of cardiac resynchronization therapy pacemaker with Dr. Kwong. After procedure it was difficult to awaken patient. His pH was 7.26 and CO2 was elevated, and he was subsequently placed on BiPAP then weaned down to room air after diuresis. It was recommended he be admitted for volume overload in the setting of hypoxemia. He has been experiencing increased shortness of breath and lower extremity swelling for the past few months since his bi-V ICD was removed in June 2020 secondary to infection. He reports 2 pillow orthopnea, dyspnea ambulating from his room to the kitchen, and a 20# weight gain. Previously he was more active and able to do household chores and yard work. He has been compliant with his home medications and has been alternating between taking 80 mg of lasix once to twice daily though he was prescribed daily on discharge from the hospital in June. He denies chest pain, fevers, chills, weight loss, nausea, vomiting, diarrhea, melena/hematochezia, and dysuria.     ED/Post-Op Course:  On room air. CXR showed cardiomegaly, pulmonary edema, and pleural effusion. Received 60 mg IV lasix x2 with good urine output and improved respiratory status. CMP revealed increased CO2.     Overview/Hospital Course:  Admitted to hospital medicine for  management of acute hypoxemic respiratory failure likely secondary to acute on chronic combined HFpEF/HFrEF s/p Bi-V ICD placement. Diursed on the floor with 1 day of lasix gtt and IV pushes. CXR with cardiomegaly, cephalization, pleural effusions, and congestion. Repeat EF 18%, G3DD, PAP 50s. Diuresed and HTS following. Had to be transferred to MICU requiring pressor support 2/2 septic shock from acute complicated cystitis of serratia. Started on cefepime. Weaned off pressors and transferred back to the floor with oxygen requirement of 3 L.     Interval History: NAEON. HDS on 3 L nasal cannula with bipap use at night. Does admit to coughing up yellow sputum in the AM ongoing for multiple days. Denies chills, chest pain, pleuritic chest pain, dyspnea, or abdominal discomfort.     Review of Systems   Constitutional: Negative for activity change, chills, diaphoresis, fatigue, fever and unexpected weight change (weight gain ).   HENT: Negative for facial swelling and trouble swallowing.    Eyes: Negative for visual disturbance.   Respiratory: Positive for cough. Negative for apnea, choking, chest tightness, shortness of breath and wheezing.    Cardiovascular: Negative for chest pain, palpitations and leg swelling.   Gastrointestinal: Positive for abdominal distention. Negative for abdominal pain, blood in stool, constipation, diarrhea, nausea and vomiting.   Endocrine: Negative for cold intolerance, heat intolerance and polyuria.   Genitourinary: Negative for dysuria, enuresis, frequency, hematuria and urgency.   Musculoskeletal: Negative for back pain and myalgias.   Skin: Negative for color change, pallor and wound.   Neurological: Negative for dizziness, tremors and weakness.   Hematological: Negative for adenopathy.   Psychiatric/Behavioral: Negative for agitation, behavioral problems and confusion.     Objective:     Vital Signs (Most Recent):  Temp: (!) 101.9 °F (38.8 °C) (10/06/20 1718)  Pulse: 89 (10/06/20  1600)  Resp: (!) 44 (10/06/20 1600)  BP: (!) 95/46 (10/06/20 1600)  SpO2: (!) 93 % (10/06/20 1600) Vital Signs (24h Range):  Temp:  [97.7 °F (36.5 °C)-101.9 °F (38.8 °C)] 101.9 °F (38.8 °C)  Pulse:  [] 89  Resp:  [14-44] 44  SpO2:  [84 %-99 %] 93 %  BP: ()/(44-81) 95/46     Weight: 134 kg (295 lb 6.7 oz)  Body mass index is 35.03 kg/m².    Intake/Output Summary (Last 24 hours) at 10/6/2020 1752  Last data filed at 10/5/2020 2000  Gross per 24 hour   Intake 240 ml   Output --   Net 240 ml      Physical Exam  Vitals signs and nursing note reviewed.   Constitutional:       Appearance: Normal appearance. He is obese. He is not ill-appearing, toxic-appearing or diaphoretic.   HENT:      Head: Normocephalic and atraumatic.      Mouth/Throat:      Mouth: Mucous membranes are moist.      Pharynx: No oropharyngeal exudate.   Eyes:      General: No scleral icterus.     Extraocular Movements: Extraocular movements intact.      Conjunctiva/sclera: Conjunctivae normal.      Pupils: Pupils are equal, round, and reactive to light.   Neck:      Musculoskeletal: Normal range of motion and neck supple.   Cardiovascular:      Rate and Rhythm: Normal rate and regular rhythm.      Pulses: Normal pulses.      Heart sounds: No murmur.   Pulmonary:      Breath sounds: No wheezing.      Comments: Patient currently on RA  CTA bilaterally  No TTP chest wall  Chest:      Chest wall: No tenderness.   Abdominal:      General: Abdomen is flat. Bowel sounds are normal. There is distension.      Tenderness: There is no abdominal tenderness. There is no guarding or rebound.   Lymphadenopathy:      Cervical: No cervical adenopathy.   Skin:     General: Skin is warm and dry.      Capillary Refill: Capillary refill takes less than 2 seconds.      Comments: Wrinkled, firm, and dark skin of lower extremities to the knees   Neurological:      General: No focal deficit present.      Mental Status: He is alert and oriented to person, place, and  time.      Comments: Patient at baseline mental status per daughter         Significant Labs: All pertinent labs within the past 24 hours have been reviewed.    Significant Imaging: I have reviewed all pertinent imaging results/findings within the past 24 hours.      Assessment/Plan:      * Acute hypoxemic respiratory failure  Non-O2 dependent with need for BiPAP following bi-V ICD placement. Subsequently weaned to RA with diuresis. 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 CXR with pulmonary edema, pleural effusions, and cardiomegaly. Received 2 doses of 60 mg IV lasix s/p procedure.  DDX: Acute heart failure exacerbation  - lasix gtt 10/2, dc 10/3  - improving - euvolemic on exam  - net negative 16 L since admit  - HTS consultation per EP recs, have signed off  - was put back on oxygen during MICU course, weaning off on floor  - BNP down to 200s (500s on admit)    Plan:  - restart 80 mg lasix PO today   - restart toprol 50 mg today  - holding entresto 49-51 while on midodrine  - weaning off midodrine (currently on 5 mg TID, will dc 10/10)  - hold aldactone, consider restarting tomorrow   - K>4, Mg >2 - replete as needed  - goal O2 > 93%      Severe sepsis  2/2 acute complicated cystitis speciated to serratia. White count resolved.     - Day 2 of cefepime  - will dc on PO abx     Chronic combined systolic and diastolic congestive heart failure  History of combined systolic and diastolic dysfunction, s/p CRT-D 9/28 with continued volume overload. Has AICD. Compliant with home medications.    - Please see Acute Hypoxemic Respiratory Failure      RIGOBERTO (acute kidney injury)  RIGOBERTO during MICU course, likely 2/2 hypotension. Resolved    - given resolution of RIGOBERTO will restart lasix  - CMP daily      Essential hypertension  Home meds: carvedilol 12.5 BID, ramipiril, aldactone  - required pressor support and then midodrine in ICU, all bp meds held  -  entresto 49-51 held for now  - wean off midodrine  - hold aldactone   - restart toprol      Hyperlipidemia  Continue home pravastatin    NICM (nonischemic cardiomyopathy)  Please see Acute Hypoxemic Respiratory Failure      Biventricular ICD (implantable cardioverter-defibrillator) in place  Placed 9/28 with Dr. Kwong.    - completed prophylactic keflex dose      VTE Risk Mitigation (From admission, onward)         Ordered     heparin (porcine) injection 5,000 Units  Every 8 hours      10/06/20 1410     IP VTE HIGH RISK PATIENT  Once      09/29/20 1417     Place sequential compression device  Until discontinued      09/29/20 1417                Discharge Planning   MARIIA: 10/9/2020     Code Status: Full Code   Is the patient medically ready for discharge?: No    Reason for patient still in hospital (select all that apply): Treatment  Discharge Plan A: Home with family   Discharge Delays: None known at this time        Jennifer Ledezma MD  Department of Hospital Medicine   [facility]

## 2020-10-10 PROBLEM — R31.9 HEMATURIA: Status: ACTIVE | Noted: 2020-10-10

## 2020-10-10 LAB
ALBUMIN SERPL BCP-MCNC: 2.3 G/DL (ref 3.5–5.2)
ALP SERPL-CCNC: 108 U/L (ref 55–135)
ALT SERPL W/O P-5'-P-CCNC: 22 U/L (ref 10–44)
ANION GAP SERPL CALC-SCNC: 10 MMOL/L (ref 8–16)
AST SERPL-CCNC: 31 U/L (ref 10–40)
BACTERIA #/AREA URNS AUTO: ABNORMAL /HPF
BASOPHILS # BLD AUTO: 0.07 K/UL (ref 0–0.2)
BASOPHILS NFR BLD: 1.1 % (ref 0–1.9)
BILIRUB SERPL-MCNC: 0.7 MG/DL (ref 0.1–1)
BILIRUB UR QL STRIP: NEGATIVE
BUN SERPL-MCNC: 26 MG/DL (ref 8–23)
CALCIUM SERPL-MCNC: 9.3 MG/DL (ref 8.7–10.5)
CHLORIDE SERPL-SCNC: 103 MMOL/L (ref 95–110)
CLARITY UR REFRACT.AUTO: ABNORMAL
CO2 SERPL-SCNC: 29 MMOL/L (ref 23–29)
COLOR UR AUTO: ABNORMAL
CREAT SERPL-MCNC: 1 MG/DL (ref 0.5–1.4)
DIFFERENTIAL METHOD: ABNORMAL
EOSINOPHIL # BLD AUTO: 0.2 K/UL (ref 0–0.5)
EOSINOPHIL NFR BLD: 3.3 % (ref 0–8)
ERYTHROCYTE [DISTWIDTH] IN BLOOD BY AUTOMATED COUNT: 16.6 % (ref 11.5–14.5)
EST. GFR  (AFRICAN AMERICAN): >60 ML/MIN/1.73 M^2
EST. GFR  (NON AFRICAN AMERICAN): >60 ML/MIN/1.73 M^2
GLUCOSE SERPL-MCNC: 95 MG/DL (ref 70–110)
GLUCOSE UR QL STRIP: NEGATIVE
HCT VFR BLD AUTO: 43.2 % (ref 40–54)
HGB BLD-MCNC: 12.7 G/DL (ref 14–18)
HGB UR QL STRIP: ABNORMAL
HYALINE CASTS UR QL AUTO: 0 /LPF
IMM GRANULOCYTES # BLD AUTO: 0.03 K/UL (ref 0–0.04)
IMM GRANULOCYTES NFR BLD AUTO: 0.5 % (ref 0–0.5)
KETONES UR QL STRIP: NEGATIVE
LEUKOCYTE ESTERASE UR QL STRIP: ABNORMAL
LYMPHOCYTES # BLD AUTO: 1.9 K/UL (ref 1–4.8)
LYMPHOCYTES NFR BLD: 28.2 % (ref 18–48)
MAGNESIUM SERPL-MCNC: 2.2 MG/DL (ref 1.6–2.6)
MCH RBC QN AUTO: 23 PG (ref 27–31)
MCHC RBC AUTO-ENTMCNC: 29.4 G/DL (ref 32–36)
MCV RBC AUTO: 78 FL (ref 82–98)
MICROSCOPIC COMMENT: ABNORMAL
MONOCYTES # BLD AUTO: 1.1 K/UL (ref 0.3–1)
MONOCYTES NFR BLD: 17.4 % (ref 4–15)
NEUTROPHILS # BLD AUTO: 3.3 K/UL (ref 1.8–7.7)
NEUTROPHILS NFR BLD: 49.5 % (ref 38–73)
NITRITE UR QL STRIP: NEGATIVE
NRBC BLD-RTO: 0 /100 WBC
PH UR STRIP: 6 [PH] (ref 5–8)
PHOSPHATE SERPL-MCNC: 3.8 MG/DL (ref 2.7–4.5)
PLATELET # BLD AUTO: 173 K/UL (ref 150–350)
PMV BLD AUTO: 12 FL (ref 9.2–12.9)
POTASSIUM SERPL-SCNC: 4.2 MMOL/L (ref 3.5–5.1)
PROT SERPL-MCNC: 7.3 G/DL (ref 6–8.4)
PROT UR QL STRIP: ABNORMAL
RBC # BLD AUTO: 5.52 M/UL (ref 4.6–6.2)
RBC #/AREA URNS AUTO: >100 /HPF (ref 0–4)
SODIUM SERPL-SCNC: 142 MMOL/L (ref 136–145)
SP GR UR STRIP: 1.02 (ref 1–1.03)
SQUAMOUS #/AREA URNS AUTO: 2 /HPF
URN SPEC COLLECT METH UR: ABNORMAL
WBC # BLD AUTO: 6.57 K/UL (ref 3.9–12.7)
WBC #/AREA URNS AUTO: >100 /HPF (ref 0–5)

## 2020-10-10 PROCEDURE — 94761 N-INVAS EAR/PLS OXIMETRY MLT: CPT | Mod: HCNC

## 2020-10-10 PROCEDURE — 81001 URINALYSIS AUTO W/SCOPE: CPT | Mod: HCNC

## 2020-10-10 PROCEDURE — 87086 URINE CULTURE/COLONY COUNT: CPT | Mod: HCNC

## 2020-10-10 PROCEDURE — 83735 ASSAY OF MAGNESIUM: CPT | Mod: HCNC

## 2020-10-10 PROCEDURE — 99232 PR SUBSEQUENT HOSPITAL CARE,LEVL II: ICD-10-PCS | Mod: HCNC,GC,, | Performed by: INTERNAL MEDICINE

## 2020-10-10 PROCEDURE — 25000003 PHARM REV CODE 250: Mod: HCNC | Performed by: NURSE PRACTITIONER

## 2020-10-10 PROCEDURE — 25000003 PHARM REV CODE 250: Mod: HCNC | Performed by: STUDENT IN AN ORGANIZED HEALTH CARE EDUCATION/TRAINING PROGRAM

## 2020-10-10 PROCEDURE — 84100 ASSAY OF PHOSPHORUS: CPT | Mod: HCNC

## 2020-10-10 PROCEDURE — 63600175 PHARM REV CODE 636 W HCPCS: Mod: HCNC

## 2020-10-10 PROCEDURE — 36415 COLL VENOUS BLD VENIPUNCTURE: CPT | Mod: HCNC

## 2020-10-10 PROCEDURE — 20600001 HC STEP DOWN PRIVATE ROOM: Mod: HCNC

## 2020-10-10 PROCEDURE — 99232 SBSQ HOSP IP/OBS MODERATE 35: CPT | Mod: HCNC,GC,, | Performed by: INTERNAL MEDICINE

## 2020-10-10 PROCEDURE — 63600175 PHARM REV CODE 636 W HCPCS: Mod: HCNC | Performed by: STUDENT IN AN ORGANIZED HEALTH CARE EDUCATION/TRAINING PROGRAM

## 2020-10-10 PROCEDURE — 99900035 HC TECH TIME PER 15 MIN (STAT): Mod: HCNC

## 2020-10-10 PROCEDURE — 94660 CPAP INITIATION&MGMT: CPT | Mod: HCNC

## 2020-10-10 PROCEDURE — 85025 COMPLETE CBC W/AUTO DIFF WBC: CPT | Mod: HCNC

## 2020-10-10 PROCEDURE — 94799 UNLISTED PULMONARY SVC/PX: CPT | Mod: HCNC

## 2020-10-10 PROCEDURE — 80053 COMPREHEN METABOLIC PANEL: CPT | Mod: HCNC

## 2020-10-10 RX ORDER — PHENAZOPYRIDINE HYDROCHLORIDE 200 MG/1
200 TABLET, FILM COATED ORAL
Qty: 8 TABLET | Refills: 0 | Status: CANCELLED | OUTPATIENT
Start: 2020-10-10

## 2020-10-10 RX ORDER — METOPROLOL SUCCINATE 50 MG/1
50 TABLET, EXTENDED RELEASE ORAL DAILY
Qty: 30 TABLET | Refills: 11 | Status: CANCELLED | OUTPATIENT
Start: 2020-10-11 | End: 2021-10-11

## 2020-10-10 RX ORDER — FUROSEMIDE 80 MG/1
80 TABLET ORAL 2 TIMES DAILY
Qty: 60 TABLET | Refills: 2 | Status: CANCELLED | OUTPATIENT
Start: 2020-10-10

## 2020-10-10 RX ORDER — PHENAZOPYRIDINE HYDROCHLORIDE 100 MG/1
200 TABLET, FILM COATED ORAL
Status: DISCONTINUED | OUTPATIENT
Start: 2020-10-10 | End: 2020-10-13

## 2020-10-10 RX ADMIN — GABAPENTIN 100 MG: 100 CAPSULE ORAL at 09:10

## 2020-10-10 RX ADMIN — FUROSEMIDE 80 MG: 80 TABLET ORAL at 05:10

## 2020-10-10 RX ADMIN — PHENAZOPYRIDINE HYDROCHLORIDE 200 MG: 100 TABLET ORAL at 05:10

## 2020-10-10 RX ADMIN — CEFEPIME 1 G: 1 INJECTION, POWDER, FOR SOLUTION INTRAMUSCULAR; INTRAVENOUS at 09:10

## 2020-10-10 RX ADMIN — CEFEPIME 1 G: 1 INJECTION, POWDER, FOR SOLUTION INTRAMUSCULAR; INTRAVENOUS at 04:10

## 2020-10-10 RX ADMIN — GABAPENTIN 100 MG: 100 CAPSULE ORAL at 08:10

## 2020-10-10 RX ADMIN — SACUBITRIL AND VALSARTAN 1 TABLET: 49; 51 TABLET, FILM COATED ORAL at 01:10

## 2020-10-10 RX ADMIN — SACUBITRIL AND VALSARTAN 1 TABLET: 49; 51 TABLET, FILM COATED ORAL at 09:10

## 2020-10-10 RX ADMIN — METOPROLOL SUCCINATE 50 MG: 50 TABLET, EXTENDED RELEASE ORAL at 08:10

## 2020-10-10 RX ADMIN — ENOXAPARIN SODIUM 40 MG: 40 INJECTION SUBCUTANEOUS at 05:10

## 2020-10-10 RX ADMIN — FUROSEMIDE 80 MG: 80 TABLET ORAL at 08:10

## 2020-10-10 RX ADMIN — GABAPENTIN 100 MG: 100 CAPSULE ORAL at 03:10

## 2020-10-10 RX ADMIN — PRAVASTATIN SODIUM 80 MG: 40 TABLET ORAL at 08:10

## 2020-10-10 RX ADMIN — PHENAZOPYRIDINE HYDROCHLORIDE 200 MG: 100 TABLET ORAL at 01:10

## 2020-10-10 RX ADMIN — DOCUSATE SODIUM 50MG AND SENNOSIDES 8.6MG 1 TABLET: 8.6; 5 TABLET, FILM COATED ORAL at 08:10

## 2020-10-10 RX ADMIN — CEFEPIME 1 G: 1 INJECTION, POWDER, FOR SOLUTION INTRAMUSCULAR; INTRAVENOUS at 01:10

## 2020-10-10 NOTE — PLAN OF CARE
10/10/20 5338   Post-Acute Status   Post-Acute Authorization Placement   Post-Acute Placement Status Referrals Sent     SW faxed referral to [facility], and [facility] via  for review. SW will follow up as needed.    Tasia Costa LMSW  Case Management   [facility]   Ext. [phone]

## 2020-10-10 NOTE — ASSESSMENT & PLAN NOTE
2/2 acute complicated cystitis speciated to serratia. White count resolved.     - Day 3 of cefepime  - will dc on PO abx

## 2020-10-10 NOTE — PT/OT/SLP PROGRESS
Occupational Therapy      Patient Name:  Papito Bhakta   MRN:  [id number]    Patient not seen today secondary to getting bed bath with PCT. Will follow-up.    KAELA Millan  10/10/2020

## 2020-10-10 NOTE — PROGRESS NOTES
[facility] Hospital Medicine  Progress Note    Patient Name: Papito Bhakta  MRN: [id number]  Patient Class: IP- Inpatient   Admission Date: 9/28/2020  Length of Stay: 10 days  Attending Physician: Skye Inman MD  Primary Care Provider: Brynn To MD    Hospital Medicine Team: [facility]OS[facility] MED 3 Jennifer Ledezma MD    Subjective:     Principal Problem:Acute hypoxemic respiratory failure        HPI:  Mr. Bhakta is a 66 yo gentleman with PMH of NICM with AICD and recent R CRT-D placement 9/27, hypertension, HFpEF/HFrEF (EF 25% June 2020), hyperlipidemia, and obesity presenting for shortness of breath and hypoxia. On September 27 he underwent placement of cardiac resynchronization therapy pacemaker with Dr. Kwong. After procedure it was difficult to awaken patient. His pH was 7.26 and CO2 was elevated, and he was subsequently placed on BiPAP then weaned down to room air after diuresis. It was recommended he be admitted for volume overload in the setting of hypoxemia. He has been experiencing increased shortness of breath and lower extremity swelling for the past few months since his bi-V ICD was removed in June 2020 secondary to infection. He reports 2 pillow orthopnea, dyspnea ambulating from his room to the kitchen, and a 20# weight gain. Previously he was more active and able to do household chores and yard work. He has been compliant with his home medications and has been alternating between taking 80 mg of lasix once to twice daily though he was prescribed daily on discharge from the hospital in June. He denies chest pain, fevers, chills, weight loss, nausea, vomiting, diarrhea, melena/hematochezia, and dysuria.     ED/Post-Op Course:  On room air. CXR showed cardiomegaly, pulmonary edema, and pleural effusion. Received 60 mg IV lasix x2 with good urine output and improved respiratory status. CMP revealed increased CO2.     Overview/Hospital Course:  Admitted to hospital medicine for  management of acute hypoxemic respiratory failure likely secondary to acute on chronic combined HFpEF/HFrEF s/p Bi-V ICD placement. Diursed on the floor with 1 day of lasix gtt and IV pushes. CXR with cardiomegaly, cephalization, pleural effusions, and congestion. Repeat EF 18%, G3DD, PAP 50s. Diuresed and HTS following. Had to be transferred to MICU requiring pressor support 2/2 septic shock from acute complicated cystitis of serratia. Started on cefepime. Weaned off pressors and transferred back to the floor with oxygen requirement of 3 L.     Interval History: No acute events overnight, remained off oxygen. Did have new onset hematuria and dysuria. Also has open cut on his genital worsening from prior. Has agreed to rehab upon discharge.     Review of Systems   Constitutional: Negative for activity change, chills, diaphoresis, fatigue, fever and unexpected weight change (weight gain ).   HENT: Negative for facial swelling and trouble swallowing.    Eyes: Negative for visual disturbance.   Respiratory: Positive for cough. Negative for apnea, choking, chest tightness, shortness of breath and wheezing.    Cardiovascular: Negative for chest pain, palpitations and leg swelling.   Gastrointestinal: Positive for abdominal distention. Negative for abdominal pain, blood in stool, constipation, diarrhea, nausea and vomiting.   Endocrine: Negative for cold intolerance, heat intolerance and polyuria.   Genitourinary: Positive for dysuria and hematuria. Negative for enuresis, frequency and urgency.   Musculoskeletal: Negative for back pain and myalgias.   Skin: Negative for color change, pallor and wound.   Neurological: Negative for dizziness, tremors and weakness.   Hematological: Negative for adenopathy.   Psychiatric/Behavioral: Negative for agitation, behavioral problems and confusion.     Objective:     Vital Signs (Most Recent):  Temp: 97.9 °F (36.6 °C) (10/10/20 1148)  Pulse: 74 (10/10/20 1100)  Resp: 16 (10/10/20  0741)  BP: 122/72 (10/10/20 1331)  SpO2: 98 % (10/10/20 0741) Vital Signs (24h Range):  Temp:  [97.9 °F (36.6 °C)-98.7 °F (37.1 °C)] 97.9 °F (36.6 °C)  Pulse:  [74-90] 74  Resp:  [16-39] 16  SpO2:  [91 %-100 %] 98 %  BP: (110-130)/(68-78) 122/72     Weight: 134.2 kg (295 lb 13.7 oz)  Body mass index is 35.08 kg/m².    Intake/Output Summary (Last 24 hours) at 10/10/2020 1436  Last data filed at 10/10/2020 1332  Gross per 24 hour   Intake 360 ml   Output 975 ml   Net -615 ml      Physical Exam  Vitals signs and nursing note reviewed.   Constitutional:       Appearance: Normal appearance. He is obese. He is not ill-appearing, toxic-appearing or diaphoretic.   HENT:      Head: Normocephalic and atraumatic.      Mouth/Throat:      Mouth: Mucous membranes are moist.      Pharynx: No oropharyngeal exudate.   Eyes:      General: No scleral icterus.     Extraocular Movements: Extraocular movements intact.      Conjunctiva/sclera: Conjunctivae normal.      Pupils: Pupils are equal, round, and reactive to light.   Neck:      Musculoskeletal: Normal range of motion and neck supple.   Cardiovascular:      Rate and Rhythm: Normal rate and regular rhythm.      Pulses: Normal pulses.      Heart sounds: No murmur.   Pulmonary:      Breath sounds: No wheezing.      Comments: Patient currently on RA  Chest:      Chest wall: No tenderness.   Abdominal:      General: Abdomen is flat. Bowel sounds are normal. There is no distension.      Tenderness: There is no abdominal tenderness. There is no guarding or rebound.   Genitourinary:     Comments: Red tinged urine in urinal   Lymphadenopathy:      Cervical: No cervical adenopathy.   Skin:     General: Skin is warm and dry.      Capillary Refill: Capillary refill takes less than 2 seconds.      Comments: Wrinkled, firm, and dark skin of lower extremities to the knees   Neurological:      General: No focal deficit present.      Mental Status: He is alert and oriented to person, place, and  time.      Comments: Patient at baseline mental status per daughter         Significant Labs: All pertinent labs within the past 24 hours have been reviewed.    Significant Imaging: I have reviewed all pertinent imaging results/findings within the past 24 hours.      Assessment/Plan:      * Acute hypoxemic respiratory failure  Non-O2 dependent with need for BiPAP following bi-V ICD placement. Subsequently weaned to RA with diuresis. 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 CXR with pulmonary edema, pleural effusions, and cardiomegaly. Received 2 doses of 60 mg IV lasix s/p procedure.  DDX: Acute heart failure exacerbation  - lasix gtt 10/2, dc 10/3  - improving - euvolemic on exam  - net negative 16 L since admit  - HTS consultation per EP recs, have signed off  - was put back on oxygen during MICU course, weaning off on floor  - BNP down to 200s (500s on admit)    Plan:  - 80 mg lasix PO  - toprol 50 qd  - off midodrine  - start entresto 49/51  - hold aldactone, consider restarting tomorrow   - K>4, Mg >2 - replete as needed  - goal O2 > 93%      Severe sepsis  2/2 acute complicated cystitis speciated to serratia. White count resolved.     - Day 3 of cefepime  - will dc on PO abx     Chronic combined systolic and diastolic congestive heart failure  History of combined systolic and diastolic dysfunction, s/p CRT-D 9/28 with continued volume overload. Has AICD. Compliant with home medications.    - Please see Acute Hypoxemic Respiratory Failure      RIGOBERTO (acute kidney injury)  RIGOBERTO during MICU course, likely 2/2 hypotension. Resolved    - given resolution of RIGOBERTO will restart lasix  - CMP daily      Essential hypertension  Home meds: carvedilol 12.5 BID, ramipiril, aldactone  - required pressor support and then midodrine in ICU, all bp meds held  - entresto 49-51  - weaned off midodrine  - hold aldactone   - restart  toprol      Hyperlipidemia  Continue home pravastatin    NICM (nonischemic cardiomyopathy)  Please see Acute Hypoxemic Respiratory Failure      Hematuria  New onset hematuria, also dysuria and open cut on genital 2/2 trauma during friend removal    Plan  - repeat UA with RBC > 100 and 3+ occult blood  - pyridium for dysuria  - wound care consultation placed      Biventricular ICD (implantable cardioverter-defibrillator) in place  Placed 9/28 with Dr. Kwong.    - completed prophylactic keflex dose        VTE Risk Mitigation (From admission, onward)         Ordered     enoxaparin injection 40 mg  Every 24 hours      10/09/20 1304     IP VTE HIGH RISK PATIENT  Once      09/29/20 1417     Place sequential compression device  Until discontinued      09/29/20 1417                Discharge Planning   MARIIA: 10/10/2020     Code Status: Full Code   Is the patient medically ready for discharge?: No    Reason for patient still in hospital (select all that apply): Treatment  Discharge Plan A: Home with family   Discharge Delays: None known at this time          Jennifer Ledezma MD  Department of Hospital Medicine   [facility]

## 2020-10-10 NOTE — PLAN OF CARE
Problem: Infection  Goal: Infection Symptom Resolution  Outcome: Ongoing, Progressing     Problem: Wound  Goal: Optimal Wound Healing  Outcome: Ongoing, Progressing     Problem: Adult Inpatient Plan of Care  Goal: Plan of Care Review  Outcome: Ongoing, Progressing     Problem: Bariatric Environmental Safety  Goal: Safety Maintained with Care  Outcome: Ongoing, Progressing     Problem: Fall Injury Risk  Goal: Absence of Fall and Fall-Related Injury  Outcome: Ongoing, Progressing     Problem: Skin Injury Risk Increased  Goal: Skin Health and Integrity  Outcome: Ongoing, Progressing     Problem: Fluid Imbalance (Heart Failure)  Goal: Fluid Balance  Outcome: Ongoing, Progressing     Problem: Infection (Sepsis/Septic Shock)  Goal: Absence of Infection Signs/Symptoms  Outcome: Ongoing, Progressing     Problem: Respiratory Compromise (Sepsis/Septic Shock)  Goal: Effective Oxygenation and Ventilation  Outcome: Ongoing, Progressing     Problem: Fluid Imbalance (Acute Kidney Injury/Impairment)  Goal: Optimal Fluid Balance  Outcome: Ongoing, Progressing

## 2020-10-10 NOTE — PLAN OF CARE
Problem: Adult Inpatient Plan of Care  Goal: Plan of Care Review  Outcome: Ongoing, Progressing  Goal: Patient-Specific Goal (Individualization)  Outcome: Ongoing, Progressing  Flowsheets (Taken 10/9/2020 1947)  Individualized Care Needs: assist with turning  Anxieties, Fears or Concerns: not being able to walk  Patient-Specific Goals (Include Timeframe): being able to walk so can get discharged  Goal: Optimal Comfort and Wellbeing  Outcome: Ongoing, Progressing  Intervention: Provide Person-Centered Care  Flowsheets (Taken 10/9/2020 1947)  Trust Relationship/Rapport:   care explained   choices provided   emotional support provided   empathic listening provided   questions answered   questions encouraged   reassurance provided   thoughts/feelings acknowledged     Problem: Adjustment to Illness (Heart Failure)  Goal: Optimal Coping  Outcome: Ongoing, Progressing  Intervention: Support and Optimize Psychosocial Response to Chronic Illness  Flowsheets (Taken 10/9/2020 1947)  Supportive Measures:   active listening utilized   counseling provided   decision-making supported   self-care encouraged   verbalization of feelings encouraged     Pt AAOx4, VSS, denies pain. Pt titrated off of 02 today. SPO2 WNL. Plan is possible d/c tomorrow. Plan of care reviewed with pt.    Suturegard Retention Suture: 2-0 Nylon

## 2020-10-10 NOTE — ASSESSMENT & PLAN NOTE
New onset hematuria, also dysuria and open cut on genital 2/2 trauma during friend removal    Plan  - repeat UA with RBC > 100 and 3+ occult blood  - pyridium for dysuria  - wound care consultation placed

## 2020-10-10 NOTE — ASSESSMENT & PLAN NOTE
Non-O2 dependent with need for BiPAP following bi-V ICD placement. Subsequently weaned to RA with diuresis. 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 CXR with pulmonary edema, pleural effusions, and cardiomegaly. Received 2 doses of 60 mg IV lasix s/p procedure.  DDX: Acute heart failure exacerbation  - lasix gtt 10/2, dc 10/3  - improving - euvolemic on exam  - net negative 16 L since admit  - HTS consultation per EP recs, have signed off  - was put back on oxygen during MICU course, weaning off on floor  - BNP down to 200s (500s on admit)    Plan:  - 80 mg lasix PO  - toprol 50 qd  - off midodrine  - start entresto 49/51  - hold aldactone, consider restarting tomorrow   - K>4, Mg >2 - replete as needed  - goal O2 > 93%

## 2020-10-10 NOTE — SUBJECTIVE & OBJECTIVE
Interval History: No acute events overnight, remained off oxygen. Did have new onset hematuria and dysuria. Also has open cut on his genital worsening from prior.     Review of Systems   Constitutional: Negative for activity change, chills, diaphoresis, fatigue, fever and unexpected weight change (weight gain ).   HENT: Negative for facial swelling and trouble swallowing.    Eyes: Negative for visual disturbance.   Respiratory: Positive for cough. Negative for apnea, choking, chest tightness, shortness of breath and wheezing.    Cardiovascular: Negative for chest pain, palpitations and leg swelling.   Gastrointestinal: Positive for abdominal distention. Negative for abdominal pain, blood in stool, constipation, diarrhea, nausea and vomiting.   Endocrine: Negative for cold intolerance, heat intolerance and polyuria.   Genitourinary: Positive for dysuria and hematuria. Negative for enuresis, frequency and urgency.   Musculoskeletal: Negative for back pain and myalgias.   Skin: Negative for color change, pallor and wound.   Neurological: Negative for dizziness, tremors and weakness.   Hematological: Negative for adenopathy.   Psychiatric/Behavioral: Negative for agitation, behavioral problems and confusion.     Objective:     Vital Signs (Most Recent):  Temp: 97.9 °F (36.6 °C) (10/10/20 1148)  Pulse: 74 (10/10/20 1100)  Resp: 16 (10/10/20 0741)  BP: 122/72 (10/10/20 1331)  SpO2: 98 % (10/10/20 0741) Vital Signs (24h Range):  Temp:  [97.9 °F (36.6 °C)-98.7 °F (37.1 °C)] 97.9 °F (36.6 °C)  Pulse:  [74-90] 74  Resp:  [16-39] 16  SpO2:  [91 %-100 %] 98 %  BP: (110-130)/(68-78) 122/72     Weight: 134.2 kg (295 lb 13.7 oz)  Body mass index is 35.08 kg/m².    Intake/Output Summary (Last 24 hours) at 10/10/2020 1436  Last data filed at 10/10/2020 1332  Gross per 24 hour   Intake 360 ml   Output 975 ml   Net -615 ml      Physical Exam  Vitals signs and nursing note reviewed.   Constitutional:       Appearance: Normal  appearance. He is obese. He is not ill-appearing, toxic-appearing or diaphoretic.   HENT:      Head: Normocephalic and atraumatic.      Mouth/Throat:      Mouth: Mucous membranes are moist.      Pharynx: No oropharyngeal exudate.   Eyes:      General: No scleral icterus.     Extraocular Movements: Extraocular movements intact.      Conjunctiva/sclera: Conjunctivae normal.      Pupils: Pupils are equal, round, and reactive to light.   Neck:      Musculoskeletal: Normal range of motion and neck supple.   Cardiovascular:      Rate and Rhythm: Normal rate and regular rhythm.      Pulses: Normal pulses.      Heart sounds: No murmur.   Pulmonary:      Breath sounds: No wheezing.      Comments: Patient currently on RA  Chest:      Chest wall: No tenderness.   Abdominal:      General: Abdomen is flat. Bowel sounds are normal. There is no distension.      Tenderness: There is no abdominal tenderness. There is no guarding or rebound.   Genitourinary:     Comments: Red tinged urine in urinal   Lymphadenopathy:      Cervical: No cervical adenopathy.   Skin:     General: Skin is warm and dry.      Capillary Refill: Capillary refill takes less than 2 seconds.      Comments: Wrinkled, firm, and dark skin of lower extremities to the knees   Neurological:      General: No focal deficit present.      Mental Status: He is alert and oriented to person, place, and time.      Comments: Patient at baseline mental status per daughter         Significant Labs: All pertinent labs within the past 24 hours have been reviewed.    Significant Imaging: I have reviewed all pertinent imaging results/findings within the past 24 hours.

## 2020-10-10 NOTE — PLAN OF CARE
Problem: Adult Inpatient Plan of Care  Goal: Plan of Care Review  Outcome: Ongoing, Progressing  Goal: Patient-Specific Goal (Individualization)  Outcome: Ongoing, Progressing  Flowsheets (Taken 10/10/2020 1823)  Individualized Care Needs: assist with turning  Anxieties, Fears or Concerns: being incontient of BM  Patient-Specific Goals (Include Timeframe): getting discharged to rehab center  Goal: Optimal Comfort and Wellbeing  Outcome: Ongoing, Progressing  Intervention: Provide Person-Centered Care  Flowsheets (Taken 10/10/2020 1823)  Trust Relationship/Rapport:   care explained   choices provided   emotional support provided   empathic listening provided   questions answered   questions encouraged   reassurance provided   thoughts/feelings acknowledged     Problem: Skin Injury Risk Increased  Goal: Skin Health and Integrity  Outcome: Ongoing, Progressing  Intervention: Optimize Skin Protection  Flowsheets (Taken 10/10/2020 1823)  Pressure Reduction Techniques: frequent weight shift encouraged  Pressure Reduction Devices: positioning supports utilized  Skin Protection: adhesive use limited  Head of Bed (HOB): HOB elevated     Pt AAOx4, VSS, denies pain. Pt on room air, SPO2 WNL. Pt having frequent BMs today. Plan to discharge pt to rehab. Plan of care reviewed with pt.

## 2020-10-10 NOTE — TREATMENT PLAN
Patient needs treatment for possible UTI and then a repeat urine culture and urinalysis to document that hematuria is not associated with cystitis.   Continue antibiotics and treatment per primary.   If hematuria persists after UTI has clear patient needs outpatient work-up in clinic.   He is emptying his bladder well and is not in retention.   No urological intervention needed at this time.      Patsy Arndt  PGY 2

## 2020-10-10 NOTE — ASSESSMENT & PLAN NOTE
Home meds: carvedilol 12.5 BID, ramipiril, aldactone  - required pressor support and then midodrine in ICU, all bp meds held  - entresto 49-51  - weaned off midodrine  - hold aldactone   - restart toprol

## 2020-10-11 LAB
ALBUMIN SERPL BCP-MCNC: 2.3 G/DL (ref 3.5–5.2)
ALP SERPL-CCNC: 109 U/L (ref 55–135)
ALT SERPL W/O P-5'-P-CCNC: 22 U/L (ref 10–44)
ANION GAP SERPL CALC-SCNC: 9 MMOL/L (ref 8–16)
AST SERPL-CCNC: 31 U/L (ref 10–40)
BACTERIA BLD CULT: NORMAL
BACTERIA BLD CULT: NORMAL
BACTERIA UR CULT: NO GROWTH
BASOPHILS # BLD AUTO: 0.06 K/UL (ref 0–0.2)
BASOPHILS NFR BLD: 0.8 % (ref 0–1.9)
BILIRUB SERPL-MCNC: 0.7 MG/DL (ref 0.1–1)
BUN SERPL-MCNC: 22 MG/DL (ref 8–23)
CALCIUM SERPL-MCNC: 8.9 MG/DL (ref 8.7–10.5)
CHLORIDE SERPL-SCNC: 103 MMOL/L (ref 95–110)
CO2 SERPL-SCNC: 28 MMOL/L (ref 23–29)
CREAT SERPL-MCNC: 0.9 MG/DL (ref 0.5–1.4)
DIFFERENTIAL METHOD: ABNORMAL
EOSINOPHIL # BLD AUTO: 0.3 K/UL (ref 0–0.5)
EOSINOPHIL NFR BLD: 4.2 % (ref 0–8)
ERYTHROCYTE [DISTWIDTH] IN BLOOD BY AUTOMATED COUNT: 16.9 % (ref 11.5–14.5)
EST. GFR  (AFRICAN AMERICAN): >60 ML/MIN/1.73 M^2
EST. GFR  (NON AFRICAN AMERICAN): >60 ML/MIN/1.73 M^2
GLUCOSE SERPL-MCNC: 93 MG/DL (ref 70–110)
HCT VFR BLD AUTO: 43.3 % (ref 40–54)
HGB BLD-MCNC: 12.8 G/DL (ref 14–18)
IMM GRANULOCYTES # BLD AUTO: 0.06 K/UL (ref 0–0.04)
IMM GRANULOCYTES NFR BLD AUTO: 0.8 % (ref 0–0.5)
LYMPHOCYTES # BLD AUTO: 2 K/UL (ref 1–4.8)
LYMPHOCYTES NFR BLD: 26.8 % (ref 18–48)
MAGNESIUM SERPL-MCNC: 2 MG/DL (ref 1.6–2.6)
MCH RBC QN AUTO: 22.8 PG (ref 27–31)
MCHC RBC AUTO-ENTMCNC: 29.6 G/DL (ref 32–36)
MCV RBC AUTO: 77 FL (ref 82–98)
MONOCYTES # BLD AUTO: 1.1 K/UL (ref 0.3–1)
MONOCYTES NFR BLD: 14.3 % (ref 4–15)
NEUTROPHILS # BLD AUTO: 4 K/UL (ref 1.8–7.7)
NEUTROPHILS NFR BLD: 53.1 % (ref 38–73)
NRBC BLD-RTO: 0 /100 WBC
PHOSPHATE SERPL-MCNC: 3.4 MG/DL (ref 2.7–4.5)
PLATELET # BLD AUTO: 185 K/UL (ref 150–350)
PMV BLD AUTO: 11.8 FL (ref 9.2–12.9)
POTASSIUM SERPL-SCNC: 4 MMOL/L (ref 3.5–5.1)
PROT SERPL-MCNC: 7.2 G/DL (ref 6–8.4)
RBC # BLD AUTO: 5.61 M/UL (ref 4.6–6.2)
SODIUM SERPL-SCNC: 140 MMOL/L (ref 136–145)
WBC # BLD AUTO: 7.57 K/UL (ref 3.9–12.7)

## 2020-10-11 PROCEDURE — 63600175 PHARM REV CODE 636 W HCPCS: Mod: HCNC

## 2020-10-11 PROCEDURE — 36415 COLL VENOUS BLD VENIPUNCTURE: CPT | Mod: HCNC

## 2020-10-11 PROCEDURE — 63600175 PHARM REV CODE 636 W HCPCS: Mod: HCNC | Performed by: STUDENT IN AN ORGANIZED HEALTH CARE EDUCATION/TRAINING PROGRAM

## 2020-10-11 PROCEDURE — 20600001 HC STEP DOWN PRIVATE ROOM: Mod: HCNC

## 2020-10-11 PROCEDURE — 83735 ASSAY OF MAGNESIUM: CPT | Mod: HCNC

## 2020-10-11 PROCEDURE — 25000003 PHARM REV CODE 250: Mod: HCNC | Performed by: NURSE PRACTITIONER

## 2020-10-11 PROCEDURE — 99900035 HC TECH TIME PER 15 MIN (STAT): Mod: HCNC

## 2020-10-11 PROCEDURE — 25000003 PHARM REV CODE 250: Mod: HCNC | Performed by: STUDENT IN AN ORGANIZED HEALTH CARE EDUCATION/TRAINING PROGRAM

## 2020-10-11 PROCEDURE — 85025 COMPLETE CBC W/AUTO DIFF WBC: CPT | Mod: HCNC

## 2020-10-11 PROCEDURE — 99232 PR SUBSEQUENT HOSPITAL CARE,LEVL II: ICD-10-PCS | Mod: HCNC,GC,, | Performed by: INTERNAL MEDICINE

## 2020-10-11 PROCEDURE — 80053 COMPREHEN METABOLIC PANEL: CPT | Mod: HCNC

## 2020-10-11 PROCEDURE — 94761 N-INVAS EAR/PLS OXIMETRY MLT: CPT | Mod: HCNC

## 2020-10-11 PROCEDURE — 99232 SBSQ HOSP IP/OBS MODERATE 35: CPT | Mod: HCNC,GC,, | Performed by: INTERNAL MEDICINE

## 2020-10-11 PROCEDURE — 84100 ASSAY OF PHOSPHORUS: CPT | Mod: HCNC

## 2020-10-11 RX ORDER — SPIRONOLACTONE 25 MG/1
25 TABLET ORAL DAILY
Qty: 90 TABLET | Refills: 3 | Status: CANCELLED
Start: 2020-10-11

## 2020-10-11 RX ORDER — IBUPROFEN 200 MG
200 TABLET ORAL NIGHTLY PRN
Refills: 0 | Status: ON HOLD
Start: 2020-10-11 | End: 2020-11-21 | Stop reason: SDUPTHER

## 2020-10-11 RX ORDER — PRAVASTATIN SODIUM 80 MG/1
80 TABLET ORAL DAILY
Qty: 90 TABLET | Refills: 3
Start: 2020-10-11 | End: 2021-04-12 | Stop reason: SDUPTHER

## 2020-10-11 RX ORDER — LEVOFLOXACIN 750 MG/1
750 TABLET ORAL DAILY
Status: COMPLETED | OUTPATIENT
Start: 2020-10-12 | End: 2020-10-14

## 2020-10-11 RX ORDER — SPIRONOLACTONE 25 MG/1
25 TABLET ORAL DAILY
Status: DISCONTINUED | OUTPATIENT
Start: 2020-10-12 | End: 2020-10-14

## 2020-10-11 RX ORDER — FUROSEMIDE 80 MG/1
80 TABLET ORAL 2 TIMES DAILY
Qty: 180 TABLET | Refills: 3
Start: 2020-10-11 | End: 2020-10-15

## 2020-10-11 RX ORDER — LEVOFLOXACIN 750 MG/1
750 TABLET ORAL DAILY
Qty: 3 TABLET
Start: 2020-10-12 | End: 2020-10-13

## 2020-10-11 RX ORDER — ASPIRIN 325 MG
325 TABLET ORAL DAILY
Refills: 0 | Status: ON HOLD
Start: 2020-10-11 | End: 2020-11-21 | Stop reason: SDUPTHER

## 2020-10-11 RX ORDER — CARVEDILOL 12.5 MG/1
12.5 TABLET ORAL 2 TIMES DAILY
Qty: 60 TABLET | Refills: 11
Start: 2020-10-11 | End: 2020-10-14 | Stop reason: HOSPADM

## 2020-10-11 RX ORDER — SPIRONOLACTONE 25 MG/1
25 TABLET ORAL DAILY
Qty: 90 TABLET | Refills: 3
Start: 2020-10-11 | End: 2020-10-14 | Stop reason: SDUPTHER

## 2020-10-11 RX ADMIN — GABAPENTIN 100 MG: 100 CAPSULE ORAL at 09:10

## 2020-10-11 RX ADMIN — METOPROLOL SUCCINATE 50 MG: 50 TABLET, EXTENDED RELEASE ORAL at 08:10

## 2020-10-11 RX ADMIN — CEFEPIME 1 G: 1 INJECTION, POWDER, FOR SOLUTION INTRAMUSCULAR; INTRAVENOUS at 02:10

## 2020-10-11 RX ADMIN — GABAPENTIN 100 MG: 100 CAPSULE ORAL at 08:10

## 2020-10-11 RX ADMIN — ENOXAPARIN SODIUM 40 MG: 40 INJECTION SUBCUTANEOUS at 05:10

## 2020-10-11 RX ADMIN — PRAVASTATIN SODIUM 80 MG: 40 TABLET ORAL at 08:10

## 2020-10-11 RX ADMIN — CEFEPIME 1 G: 1 INJECTION, POWDER, FOR SOLUTION INTRAMUSCULAR; INTRAVENOUS at 09:10

## 2020-10-11 RX ADMIN — PHENAZOPYRIDINE HYDROCHLORIDE 200 MG: 100 TABLET ORAL at 08:10

## 2020-10-11 RX ADMIN — CEFEPIME 1 G: 1 INJECTION, POWDER, FOR SOLUTION INTRAMUSCULAR; INTRAVENOUS at 05:10

## 2020-10-11 RX ADMIN — FUROSEMIDE 80 MG: 80 TABLET ORAL at 05:10

## 2020-10-11 RX ADMIN — FUROSEMIDE 80 MG: 80 TABLET ORAL at 08:10

## 2020-10-11 RX ADMIN — SACUBITRIL AND VALSARTAN 1 TABLET: 49; 51 TABLET, FILM COATED ORAL at 09:10

## 2020-10-11 RX ADMIN — SACUBITRIL AND VALSARTAN 1 TABLET: 49; 51 TABLET, FILM COATED ORAL at 08:10

## 2020-10-11 RX ADMIN — GABAPENTIN 100 MG: 100 CAPSULE ORAL at 02:10

## 2020-10-11 RX ADMIN — PHENAZOPYRIDINE HYDROCHLORIDE 200 MG: 100 TABLET ORAL at 11:10

## 2020-10-11 RX ADMIN — PHENAZOPYRIDINE HYDROCHLORIDE 200 MG: 100 TABLET ORAL at 05:10

## 2020-10-11 NOTE — ASSESSMENT & PLAN NOTE
Non-O2 dependent with need for BiPAP following bi-V ICD placement. Subsequently weaned to RA with diuresis. 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 CXR with pulmonary edema, pleural effusions, and cardiomegaly. Received 2 doses of 60 mg IV lasix s/p procedure.  DDX: Acute heart failure exacerbation  - lasix gtt 10/2, dc 10/3  - improving - euvolemic on exam  - net negative 16 L since admit  - HTS consultation per EP recs, have signed off  - was put back on oxygen during MICU course, weaning off on floor  - BNP down to 200s (500s on admit)    Plan:  - 80 mg lasix PO  - toprol 50 qd  - off midodrine  - start entresto 49/51  - aldactone, restarting tomorrow   - K>4, Mg >2 - replete as needed  - goal O2 > 93%

## 2020-10-11 NOTE — ASSESSMENT & PLAN NOTE
2/2 acute complicated cystitis speciated to serratia. White count resolved.     - Day 4 of cefepime  - will dc on PO abx

## 2020-10-11 NOTE — CONSULTS
Inpatient consult to Physical Medicine Rehab  Consult performed by: Deborah Aguayo NP  Consult ordered by: Jennifer Ledezma MD  Reason for consult: Assess rehab needs      Reviewed patient history and current admission.  Rehab team following.  Full consult to follow.    SERINA Cortez, FNP-C  Physical Medicine & Rehabilitation   10/11/2020  Spectralink: [id number]

## 2020-10-11 NOTE — PROGRESS NOTES
[facility] Hospital Medicine  Progress Note    Patient Name: Papito Bhakta  MRN: [id number]  Patient Class: IP- Inpatient   Admission Date: 9/28/2020  Length of Stay: 11 days  Attending Physician: Skye Inman MD  Primary Care Provider: Brynn To MD    Hospital Medicine Team: [facility] [place] HOSP MED 3 Patricia Mendiola MD    Subjective:     Principal Problem:Acute hypoxemic respiratory failure        HPI:  Mr. Bhakta is a 64 yo gentleman with PMH of NICM with AICD and recent R CRT-D placement 9/27, hypertension, HFpEF/HFrEF (EF 25% June 2020), hyperlipidemia, and obesity presenting for shortness of breath and hypoxia. On September 27 he underwent placement of cardiac resynchronization therapy pacemaker with Dr. Kwong. After procedure it was difficult to awaken patient. His pH was 7.26 and CO2 was elevated, and he was subsequently placed on BiPAP then weaned down to room air after diuresis. It was recommended he be admitted for volume overload in the setting of hypoxemia. He has been experiencing increased shortness of breath and lower extremity swelling for the past few months since his bi-V ICD was removed in June 2020 secondary to infection. He reports 2 pillow orthopnea, dyspnea ambulating from his room to the kitchen, and a 20# weight gain. Previously he was more active and able to do household chores and yard work. He has been compliant with his home medications and has been alternating between taking 80 mg of lasix once to twice daily though he was prescribed daily on discharge from the hospital in June. He denies chest pain, fevers, chills, weight loss, nausea, vomiting, diarrhea, melena/hematochezia, and dysuria.     ED/Post-Op Course:  On room air. CXR showed cardiomegaly, pulmonary edema, and pleural effusion. Received 60 mg IV lasix x2 with good urine output and improved respiratory status. CMP revealed increased CO2.     Overview/Hospital Course:  Admitted to hospital medicine for  management of acute hypoxemic respiratory failure likely secondary to acute on chronic combined HFpEF/HFrEF s/p Bi-V ICD placement. Diursed on the floor with 1 day of lasix gtt and IV pushes. CXR with cardiomegaly, cephalization, pleural effusions, and congestion. Repeat EF 18%, G3DD, PAP 50s. Diuresed and HTS following. Had to be transferred to MICU requiring pressor support 2/2 septic shock from acute complicated cystitis of serratia. Started on cefepime. Weaned off pressors and transferred back to the floor with oxygen requirement of 3 L.     Interval History: No acute events overnight, remained off oxygen.Dysuria and hematuria stable. Patient without any new complaints today.     Review of Systems   Constitutional: Negative for activity change, chills, diaphoresis, fatigue, fever and unexpected weight change (weight gain ).   HENT: Negative for facial swelling and trouble swallowing.    Eyes: Negative for visual disturbance.   Respiratory: Negative for apnea, cough, choking, chest tightness, shortness of breath and wheezing.    Cardiovascular: Negative for chest pain, palpitations and leg swelling.   Gastrointestinal: Negative for abdominal distention, abdominal pain, blood in stool, constipation, diarrhea, nausea and vomiting.   Endocrine: Negative for cold intolerance, heat intolerance and polyuria.   Genitourinary: Positive for dysuria and hematuria. Negative for enuresis, frequency and urgency.   Musculoskeletal: Negative for back pain and myalgias.   Skin: Negative for color change, pallor and wound.   Neurological: Negative for dizziness, tremors and weakness.   Hematological: Negative for adenopathy.   Psychiatric/Behavioral: Negative for agitation, behavioral problems and confusion.     Objective:     Vital Signs (Most Recent):  Temp: 97.8 °F (36.6 °C) (10/11/20 1100)  Pulse: 79 (10/11/20 1500)  Resp: 20 (10/11/20 1230)  BP: 108/74 (10/11/20 1230)  SpO2: 95 % (10/11/20 1500) Vital Signs (24h  Range):  Temp:  [97.8 °F (36.6 °C)-98.4 °F (36.9 °C)] 97.8 °F (36.6 °C)  Pulse:  [76-97] 79  Resp:  [19-35] 20  SpO2:  [91 %-99 %] 95 %  BP: (108-119)/(66-79) 108/74     Weight: 134.2 kg (295 lb 13.7 oz)  Body mass index is 35.08 kg/m².    Intake/Output Summary (Last 24 hours) at 10/11/2020 1521  Last data filed at 10/11/2020 1300  Gross per 24 hour   Intake --   Output 1300 ml   Net -1300 ml      Physical Exam  Vitals signs and nursing note reviewed.   Constitutional:       Appearance: Normal appearance. He is obese. He is not ill-appearing, toxic-appearing or diaphoretic.   HENT:      Head: Normocephalic and atraumatic.      Mouth/Throat:      Mouth: Mucous membranes are moist.      Pharynx: No oropharyngeal exudate.   Eyes:      General: No scleral icterus.     Extraocular Movements: Extraocular movements intact.      Conjunctiva/sclera: Conjunctivae normal.      Pupils: Pupils are equal, round, and reactive to light.   Neck:      Musculoskeletal: Normal range of motion and neck supple.   Cardiovascular:      Rate and Rhythm: Normal rate and regular rhythm.      Pulses: Normal pulses.      Heart sounds: No murmur.   Pulmonary:      Breath sounds: No wheezing.      Comments: Patient currently on RA  Chest:      Chest wall: No tenderness.   Abdominal:      General: Abdomen is flat. Bowel sounds are normal. There is no distension.      Tenderness: There is no abdominal tenderness. There is no guarding or rebound.   Genitourinary:     Comments: Red tinged urine in urinal   Lymphadenopathy:      Cervical: No cervical adenopathy.   Skin:     General: Skin is warm and dry.      Capillary Refill: Capillary refill takes less than 2 seconds.      Comments: Wrinkled, firm, and dark skin of lower extremities to the knees   Neurological:      General: No focal deficit present.      Mental Status: He is alert and oriented to person, place, and time.      Comments: Patient at baseline mental status per daughter          Significant Labs: All pertinent labs within the past 24 hours have been reviewed.    Significant Imaging: I have reviewed all pertinent imaging results/findings within the past 24 hours.      Assessment/Plan:      * Acute hypoxemic respiratory failure  Non-O2 dependent with need for BiPAP following bi-V ICD placement. Subsequently weaned to RA with diuresis. 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 CXR with pulmonary edema, pleural effusions, and cardiomegaly. Received 2 doses of 60 mg IV lasix s/p procedure.  DDX: Acute heart failure exacerbation  - lasix gtt 10/2, dc 10/3  - improving - euvolemic on exam  - net negative 16 L since admit  - HTS consultation per EP recs, have signed off  - was put back on oxygen during MICU course, weaning off on floor  - BNP down to 200s (500s on admit)    Plan:  - 80 mg lasix PO  - toprol 50 qd  - off midodrine  - start entresto 49/51  - aldactone, restarting tomorrow   - K>4, Mg >2 - replete as needed  - goal O2 > 93%      Hematuria  New onset hematuria, also dysuria and open cut on genital 2/2 trauma during friend removal    Plan  - repeat UA with RBC > 100 and 3+ occult blood  - pyridium for dysuria  - wound care consultation placed      RIGOBERTO (acute kidney injury)  RIGOBERTO during MICU course, likely 2/2 hypotension. Resolved    - given resolution of RIGOBERTO will restart lasix  - CMP daily      Severe sepsis  2/2 acute complicated cystitis speciated to serratia. White count resolved.     - Day 4 of cefepime  - will dc on PO abx     NICM (nonischemic cardiomyopathy)  Please see Acute Hypoxemic Respiratory Failure      Biventricular ICD (implantable cardioverter-defibrillator) in place  Placed 9/28 with Dr. Kwong.    - completed prophylactic keflex dose      Hyperlipidemia  Continue home pravastatin    Chronic combined systolic and diastolic congestive heart failure  History of combined systolic and diastolic  dysfunction, s/p CRT-D 9/28 with continued volume overload. Has AICD. Compliant with home medications.    - Please see Acute Hypoxemic Respiratory Failure      Essential hypertension  Home meds: carvedilol 12.5 BID, ramipiril, aldactone  - required pressor support and then midodrine in ICU, all bp meds held  - entresto 49-51  - weaned off midodrine  - resume aldactone   - restart toprol        VTE Risk Mitigation (From admission, onward)         Ordered     enoxaparin injection 40 mg  Every 24 hours      10/09/20 1304     IP VTE HIGH RISK PATIENT  Once      09/29/20 1417     Place sequential compression device  Until discontinued      09/29/20 1417                Discharge Planning   MARIIA: 10/12/2020     Code Status: Full Code   Is the patient medically ready for discharge?: Yes    Reason for patient still in hospital (select all that apply): Pending disposition  Discharge Plan A: Home with family   Discharge Delays: None known at this time    Patricia Mendiola MD  Department of Hospital Medicine   [facility]

## 2020-10-11 NOTE — PLAN OF CARE
Problem: Adult Inpatient Plan of Care  Goal: Plan of Care Review  Outcome: Ongoing, Progressing  Goal: Patient-Specific Goal (Individualization)  Outcome: Ongoing, Progressing  Flowsheets (Taken 10/11/2020 1624)  Individualized Care Needs: walk pt when PT is available  Anxieties, Fears or Concerns: not being able to go home because unable to walk  Patient-Specific Goals (Include Timeframe): discharge  Goal: Optimal Comfort and Wellbeing  Outcome: Ongoing, Progressing  Intervention: Provide Person-Centered Care  Flowsheets (Taken 10/11/2020 1624)  Trust Relationship/Rapport:   care explained   choices provided   emotional support provided   empathic listening provided   questions answered   questions encouraged   reassurance provided   thoughts/feelings acknowledged     Problem: Skin Injury Risk Increased  Goal: Skin Health and Integrity  Outcome: Ongoing, Progressing  Intervention: Optimize Skin Protection  Flowsheets (Taken 10/11/2020 1624)  Pressure Reduction Techniques:   frequent weight shift encouraged   heels elevated off bed  Pressure Reduction Devices: positioning supports utilized  Skin Protection: adhesive use limited  Head of Bed (HOB): HOB elevated     Problem: Oral Intake Inadequate (Acute Kidney Injury/Impairment)  Goal: Optimal Nutrition Intake  Outcome: Ongoing, Progressing  Intervention: Promote and Optimize Nutrition  Flowsheets (Taken 10/11/2020 1624)  Oral Nutrition Promotion:   calorie dense foods provided   rest periods promoted   safe use of adaptive equipment encouraged   social interaction promoted     Pt AAOx4, VSS, denies pain. No acute changes today. Pt on room air, SPO2 WNL. Spoke with PT about ambulating pt and stated they will see him tomorrow and that pt is not safe to ambulate without PT. Planning for discharge after ambulation. Plan of care reviewed with pt.

## 2020-10-11 NOTE — PLAN OF CARE
[facility]    FACILITY TRANSFER ORDERS      Patient Name: Papito Bhakta  YOB: 1955    PCP: Brynn To MD   PCP Address: [address] [place] [address] / [place] [facility]  PCP Phone Number: [phone]  PCP Fax: [phone]    Encounter Date: 10/11/2020    Admit to: Inpatient Rehab    Vital Signs:  Routine    Diagnoses:   Active Hospital Problems    Diagnosis  POA    *Acute hypoxemic respiratory failure [J96.01]  Yes    Hematuria [R31.9]  Unknown    RIGOBERTO (acute kidney injury) [N17.9]  Yes    Severe sepsis [A41.9, R65.20]  Yes    Acute on chronic combined systolic and diastolic heart failure [I50.43]  Yes    NICM (nonischemic cardiomyopathy) [I42.8]  Yes     Chronic    Biventricular ICD (implantable cardioverter-defibrillator) in place [Z95.810]  Yes    Hyperlipidemia [E78.5]  Yes     Chronic    Chronic combined systolic and diastolic congestive heart failure [I50.42]  Yes    Essential hypertension [I10]  Yes     Chronic      Resolved Hospital Problems   No resolved problems to display.       Allergies:Review of patient's allergies indicates:  No Known Allergies    Diet: cardiac diet    Activities: Activity as tolerated    Nursing: Per Facility Protocol      Labs: CBC and CMP Once     CONSULTS:    Physical Therapy to evaluate and treat. , Occupational Therapy to evaluate and treat. and  to evaluate for community resources/long-range planning.    MISCELLANEOUS CARE: Heart Failure Instructions:   For Weight Gain > 2-3 lbs in 1 day or 4-6 lbs over 1 week:     Increase Lasix (oral diuretic) dose to three times a day for 5 days temporarily     Obtain BMP lab test in 3 days      If weight does not decrease by 5 lbs after 5 days of increased diuretic usage:   Notify Dr. Long      WOUND CARE ORDERS  Yes: Recommend nursing to clean penis with cleansing cloth, then apply 3M barrier film spray to affected area to protect new skin from excess moisture to prevent any new breakdown.      Medications: Review discharge medications with patient and family and provide education.      Current Discharge Medication List      START taking these medications    Details   levoFLOXacin (LEVAQUIN) 750 MG tablet Take 1 tablet (750 mg total) by mouth once daily. for 3 days  Qty: 3 tablet      sacubitriL-valsartan (ENTRESTO) 49-51 mg per tablet Take 1 tablet by mouth 2 (two) times daily.  Qty: 60 tablet, Refills: 3         CONTINUE these medications which have CHANGED    Details   aspirin 325 MG tablet Take 1 tablet (325 mg total) by mouth once daily.  Refills: 0      carvediloL (COREG) 12.5 MG tablet Take 1 tablet (12.5 mg total) by mouth 2 (two) times daily.  Qty: 60 tablet, Refills: 11    Comments: .      furosemide (LASIX) 80 MG tablet Take 1 tablet (80 mg total) by mouth 2 (two) times a day.  Qty: 180 tablet, Refills: 3    Associated Diagnoses: Chronic combined systolic and diastolic congestive heart failure      ibuprofen (ADVIL,MOTRIN) 200 MG tablet Take 1 tablet (200 mg total) by mouth nightly as needed for Pain.  Refills: 0      pravastatin (PRAVACHOL) 80 MG tablet Take 1 tablet (80 mg total) by mouth once daily.  Qty: 90 tablet, Refills: 3    Associated Diagnoses: Other hyperlipidemia      spironolactone (ALDACTONE) 25 MG tablet Take 1 tablet (25 mg total) by mouth once daily.  Qty: 90 tablet, Refills: 3    Comments: .         STOP taking these medications       gabapentin (NEURONTIN) 100 MG capsule Comments:   Reason for Stopping:         ramipril (ALTACE) 10 MG capsule Comments:   Reason for Stopping:                    _________________________________  Patricia Mendiola MD  10/11/2020

## 2020-10-11 NOTE — PLAN OF CARE
No acute changes overnight. VSS. Pt on room air all night. Bedside monitoring in use. Pt resting. No needs at this time. Call bell in reach. Will continue to monitor.

## 2020-10-11 NOTE — ASSESSMENT & PLAN NOTE
Home meds: carvedilol 12.5 BID, ramipiril, aldactone  - required pressor support and then midodrine in ICU, all bp meds held  - entresto 49-51  - weaned off midodrine  - resume aldactone   - restart toprol

## 2020-10-11 NOTE — SUBJECTIVE & OBJECTIVE
Interval History: No acute events overnight, remained off oxygen.Dysuria and hematuria stable. Patient without any new complaints today.     Review of Systems   Constitutional: Negative for activity change, chills, diaphoresis, fatigue, fever and unexpected weight change (weight gain ).   HENT: Negative for facial swelling and trouble swallowing.    Eyes: Negative for visual disturbance.   Respiratory: Negative for apnea, cough, choking, chest tightness, shortness of breath and wheezing.    Cardiovascular: Negative for chest pain, palpitations and leg swelling.   Gastrointestinal: Negative for abdominal distention, abdominal pain, blood in stool, constipation, diarrhea, nausea and vomiting.   Endocrine: Negative for cold intolerance, heat intolerance and polyuria.   Genitourinary: Positive for dysuria and hematuria. Negative for enuresis, frequency and urgency.   Musculoskeletal: Negative for back pain and myalgias.   Skin: Negative for color change, pallor and wound.   Neurological: Negative for dizziness, tremors and weakness.   Hematological: Negative for adenopathy.   Psychiatric/Behavioral: Negative for agitation, behavioral problems and confusion.     Objective:     Vital Signs (Most Recent):  Temp: 97.8 °F (36.6 °C) (10/11/20 1100)  Pulse: 79 (10/11/20 1500)  Resp: 20 (10/11/20 1230)  BP: 108/74 (10/11/20 1230)  SpO2: 95 % (10/11/20 1500) Vital Signs (24h Range):  Temp:  [97.8 °F (36.6 °C)-98.4 °F (36.9 °C)] 97.8 °F (36.6 °C)  Pulse:  [76-97] 79  Resp:  [19-35] 20  SpO2:  [91 %-99 %] 95 %  BP: (108-119)/(66-79) 108/74     Weight: 134.2 kg (295 lb 13.7 oz)  Body mass index is 35.08 kg/m².    Intake/Output Summary (Last 24 hours) at 10/11/2020 1521  Last data filed at 10/11/2020 1300  Gross per 24 hour   Intake --   Output 1300 ml   Net -1300 ml      Physical Exam  Vitals signs and nursing note reviewed.   Constitutional:       Appearance: Normal appearance. He is obese. He is not ill-appearing, toxic-appearing  or diaphoretic.   HENT:      Head: Normocephalic and atraumatic.      Mouth/Throat:      Mouth: Mucous membranes are moist.      Pharynx: No oropharyngeal exudate.   Eyes:      General: No scleral icterus.     Extraocular Movements: Extraocular movements intact.      Conjunctiva/sclera: Conjunctivae normal.      Pupils: Pupils are equal, round, and reactive to light.   Neck:      Musculoskeletal: Normal range of motion and neck supple.   Cardiovascular:      Rate and Rhythm: Normal rate and regular rhythm.      Pulses: Normal pulses.      Heart sounds: No murmur.   Pulmonary:      Breath sounds: No wheezing.      Comments: Patient currently on RA  Chest:      Chest wall: No tenderness.   Abdominal:      General: Abdomen is flat. Bowel sounds are normal. There is no distension.      Tenderness: There is no abdominal tenderness. There is no guarding or rebound.   Genitourinary:     Comments: Red tinged urine in urinal   Lymphadenopathy:      Cervical: No cervical adenopathy.   Skin:     General: Skin is warm and dry.      Capillary Refill: Capillary refill takes less than 2 seconds.      Comments: Wrinkled, firm, and dark skin of lower extremities to the knees   Neurological:      General: No focal deficit present.      Mental Status: He is alert and oriented to person, place, and time.      Comments: Patient at baseline mental status per daughter         Significant Labs: All pertinent labs within the past 24 hours have been reviewed.    Significant Imaging: I have reviewed all pertinent imaging results/findings within the past 24 hours.

## 2020-10-12 LAB
ALBUMIN SERPL BCP-MCNC: 2.4 G/DL (ref 3.5–5.2)
ALP SERPL-CCNC: 103 U/L (ref 55–135)
ALT SERPL W/O P-5'-P-CCNC: 27 U/L (ref 10–44)
ANION GAP SERPL CALC-SCNC: 10 MMOL/L (ref 8–16)
ANISOCYTOSIS BLD QL SMEAR: SLIGHT
AST SERPL-CCNC: 36 U/L (ref 10–40)
BASOPHILS # BLD AUTO: 0.05 K/UL (ref 0–0.2)
BASOPHILS NFR BLD: 0.6 % (ref 0–1.9)
BILIRUB SERPL-MCNC: 0.6 MG/DL (ref 0.1–1)
BUN SERPL-MCNC: 18 MG/DL (ref 8–23)
CALCIUM SERPL-MCNC: 9 MG/DL (ref 8.7–10.5)
CHLORIDE SERPL-SCNC: 102 MMOL/L (ref 95–110)
CO2 SERPL-SCNC: 26 MMOL/L (ref 23–29)
CREAT SERPL-MCNC: 0.9 MG/DL (ref 0.5–1.4)
DIFFERENTIAL METHOD: ABNORMAL
EOSINOPHIL # BLD AUTO: 0.3 K/UL (ref 0–0.5)
EOSINOPHIL NFR BLD: 3.8 % (ref 0–8)
ERYTHROCYTE [DISTWIDTH] IN BLOOD BY AUTOMATED COUNT: 17.3 % (ref 11.5–14.5)
EST. GFR  (AFRICAN AMERICAN): >60 ML/MIN/1.73 M^2
EST. GFR  (NON AFRICAN AMERICAN): >60 ML/MIN/1.73 M^2
GLUCOSE SERPL-MCNC: 99 MG/DL (ref 70–110)
HCT VFR BLD AUTO: 44.2 % (ref 40–54)
HGB BLD-MCNC: 13.3 G/DL (ref 14–18)
HYPOCHROMIA BLD QL SMEAR: ABNORMAL
IMM GRANULOCYTES # BLD AUTO: 0.06 K/UL (ref 0–0.04)
IMM GRANULOCYTES NFR BLD AUTO: 0.7 % (ref 0–0.5)
LYMPHOCYTES # BLD AUTO: 2.1 K/UL (ref 1–4.8)
LYMPHOCYTES NFR BLD: 25 % (ref 18–48)
MAGNESIUM SERPL-MCNC: 1.7 MG/DL (ref 1.6–2.6)
MCH RBC QN AUTO: 23 PG (ref 27–31)
MCHC RBC AUTO-ENTMCNC: 30.1 G/DL (ref 32–36)
MCV RBC AUTO: 76 FL (ref 82–98)
MONOCYTES # BLD AUTO: 1.1 K/UL (ref 0.3–1)
MONOCYTES NFR BLD: 12.4 % (ref 4–15)
NEUTROPHILS # BLD AUTO: 4.9 K/UL (ref 1.8–7.7)
NEUTROPHILS NFR BLD: 57.5 % (ref 38–73)
NRBC BLD-RTO: 0 /100 WBC
OVALOCYTES BLD QL SMEAR: ABNORMAL
PHOSPHATE SERPL-MCNC: 3.5 MG/DL (ref 2.7–4.5)
PLATELET # BLD AUTO: 197 K/UL (ref 150–350)
PMV BLD AUTO: 11.9 FL (ref 9.2–12.9)
POIKILOCYTOSIS BLD QL SMEAR: SLIGHT
POLYCHROMASIA BLD QL SMEAR: ABNORMAL
POTASSIUM SERPL-SCNC: 3.7 MMOL/L (ref 3.5–5.1)
PROT SERPL-MCNC: 7.4 G/DL (ref 6–8.4)
RBC # BLD AUTO: 5.79 M/UL (ref 4.6–6.2)
SODIUM SERPL-SCNC: 138 MMOL/L (ref 136–145)
WBC # BLD AUTO: 8.47 K/UL (ref 3.9–12.7)

## 2020-10-12 PROCEDURE — 20600001 HC STEP DOWN PRIVATE ROOM: Mod: HCNC

## 2020-10-12 PROCEDURE — 99232 PR SUBSEQUENT HOSPITAL CARE,LEVL II: ICD-10-PCS | Mod: HCNC,,, | Performed by: HOSPITALIST

## 2020-10-12 PROCEDURE — 25000003 PHARM REV CODE 250: Mod: HCNC | Performed by: NURSE PRACTITIONER

## 2020-10-12 PROCEDURE — 99222 1ST HOSP IP/OBS MODERATE 55: CPT | Mod: HCNC,,, | Performed by: NURSE PRACTITIONER

## 2020-10-12 PROCEDURE — 63600175 PHARM REV CODE 636 W HCPCS: Mod: HCNC | Performed by: STUDENT IN AN ORGANIZED HEALTH CARE EDUCATION/TRAINING PROGRAM

## 2020-10-12 PROCEDURE — 25000003 PHARM REV CODE 250: Mod: HCNC | Performed by: STUDENT IN AN ORGANIZED HEALTH CARE EDUCATION/TRAINING PROGRAM

## 2020-10-12 PROCEDURE — 93010 EKG 12-LEAD: ICD-10-PCS | Mod: HCNC,,, | Performed by: INTERNAL MEDICINE

## 2020-10-12 PROCEDURE — 36415 COLL VENOUS BLD VENIPUNCTURE: CPT | Mod: HCNC

## 2020-10-12 PROCEDURE — 99900035 HC TECH TIME PER 15 MIN (STAT): Mod: HCNC

## 2020-10-12 PROCEDURE — 94761 N-INVAS EAR/PLS OXIMETRY MLT: CPT | Mod: HCNC

## 2020-10-12 PROCEDURE — 97535 SELF CARE MNGMENT TRAINING: CPT | Mod: HCNC

## 2020-10-12 PROCEDURE — 93010 ELECTROCARDIOGRAM REPORT: CPT | Mod: HCNC,,, | Performed by: INTERNAL MEDICINE

## 2020-10-12 PROCEDURE — 97530 THERAPEUTIC ACTIVITIES: CPT | Mod: HCNC

## 2020-10-12 PROCEDURE — 93005 ELECTROCARDIOGRAM TRACING: CPT | Mod: HCNC

## 2020-10-12 PROCEDURE — 99232 SBSQ HOSP IP/OBS MODERATE 35: CPT | Mod: HCNC,,, | Performed by: HOSPITALIST

## 2020-10-12 PROCEDURE — 94799 UNLISTED PULMONARY SVC/PX: CPT | Mod: HCNC

## 2020-10-12 PROCEDURE — 84100 ASSAY OF PHOSPHORUS: CPT | Mod: HCNC

## 2020-10-12 PROCEDURE — 85025 COMPLETE CBC W/AUTO DIFF WBC: CPT | Mod: HCNC

## 2020-10-12 PROCEDURE — 83735 ASSAY OF MAGNESIUM: CPT | Mod: HCNC

## 2020-10-12 PROCEDURE — 99222 PR INITIAL HOSPITAL CARE,LEVL II: ICD-10-PCS | Mod: HCNC,,, | Performed by: NURSE PRACTITIONER

## 2020-10-12 PROCEDURE — 80053 COMPREHEN METABOLIC PANEL: CPT | Mod: HCNC

## 2020-10-12 RX ORDER — ALUMINUM HYDROXIDE, MAGNESIUM HYDROXIDE, AND SIMETHICONE 2400; 240; 2400 MG/30ML; MG/30ML; MG/30ML
30 SUSPENSION ORAL EVERY 6 HOURS PRN
Status: DISCONTINUED | OUTPATIENT
Start: 2020-10-12 | End: 2020-10-15 | Stop reason: HOSPADM

## 2020-10-12 RX ORDER — POTASSIUM CHLORIDE 20 MEQ/1
40 TABLET, EXTENDED RELEASE ORAL ONCE
Status: COMPLETED | OUTPATIENT
Start: 2020-10-12 | End: 2020-10-12

## 2020-10-12 RX ORDER — MAGNESIUM SULFATE HEPTAHYDRATE 40 MG/ML
2 INJECTION, SOLUTION INTRAVENOUS ONCE
Status: COMPLETED | OUTPATIENT
Start: 2020-10-12 | End: 2020-10-12

## 2020-10-12 RX ADMIN — MAGNESIUM SULFATE IN WATER 2 G: 40 INJECTION, SOLUTION INTRAVENOUS at 07:10

## 2020-10-12 RX ADMIN — DOCUSATE SODIUM 50MG AND SENNOSIDES 8.6MG 1 TABLET: 8.6; 5 TABLET, FILM COATED ORAL at 08:10

## 2020-10-12 RX ADMIN — PHENAZOPYRIDINE HYDROCHLORIDE 200 MG: 100 TABLET ORAL at 11:10

## 2020-10-12 RX ADMIN — PHENAZOPYRIDINE HYDROCHLORIDE 200 MG: 100 TABLET ORAL at 07:10

## 2020-10-12 RX ADMIN — PRAVASTATIN SODIUM 80 MG: 40 TABLET ORAL at 08:10

## 2020-10-12 RX ADMIN — GABAPENTIN 100 MG: 100 CAPSULE ORAL at 11:10

## 2020-10-12 RX ADMIN — GABAPENTIN 100 MG: 100 CAPSULE ORAL at 08:10

## 2020-10-12 RX ADMIN — POLYETHYLENE GLYCOL 3350 17 G: 17 POWDER, FOR SOLUTION ORAL at 08:10

## 2020-10-12 RX ADMIN — POTASSIUM CHLORIDE 40 MEQ: 1500 TABLET, EXTENDED RELEASE ORAL at 07:10

## 2020-10-12 RX ADMIN — GABAPENTIN 100 MG: 100 CAPSULE ORAL at 02:10

## 2020-10-12 RX ADMIN — SACUBITRIL AND VALSARTAN 1 TABLET: 49; 51 TABLET, FILM COATED ORAL at 11:10

## 2020-10-12 RX ADMIN — ALUMINUM HYDROXIDE, MAGNESIUM HYDROXIDE, AND DIMETHICONE 30 ML: 400; 400; 40 SUSPENSION ORAL at 02:10

## 2020-10-12 RX ADMIN — PHENAZOPYRIDINE HYDROCHLORIDE 200 MG: 100 TABLET ORAL at 05:10

## 2020-10-12 RX ADMIN — LEVOFLOXACIN 750 MG: 750 TABLET, FILM COATED ORAL at 08:10

## 2020-10-12 RX ADMIN — ENOXAPARIN SODIUM 40 MG: 40 INJECTION SUBCUTANEOUS at 05:10

## 2020-10-12 RX ADMIN — SPIRONOLACTONE 25 MG: 25 TABLET ORAL at 08:10

## 2020-10-12 RX ADMIN — FUROSEMIDE 80 MG: 80 TABLET ORAL at 08:10

## 2020-10-12 RX ADMIN — METOPROLOL SUCCINATE 50 MG: 50 TABLET, EXTENDED RELEASE ORAL at 08:10

## 2020-10-12 RX ADMIN — FUROSEMIDE 80 MG: 80 TABLET ORAL at 05:10

## 2020-10-12 RX ADMIN — SACUBITRIL AND VALSARTAN 1 TABLET: 49; 51 TABLET, FILM COATED ORAL at 08:10

## 2020-10-12 NOTE — ASSESSMENT & PLAN NOTE
- C- xray with pulmonary edema, pleural effusions, and cardiomegaly.  - Was on Lasix gtt and now all improved and on Lasix PO

## 2020-10-12 NOTE — SUBJECTIVE & OBJECTIVE
Past Medical History:   Diagnosis Date    RIGOBERTO (acute kidney injury) 10/7/2020    Anticoagulant long-term use     Aspirin    CHF (congestive heart failure)     Hyperlipidemia     Hypertension     NICM (nonischemic cardiomyopathy) 2020    Obesity      Past Surgical History:   Procedure Laterality Date    INSERTION OF BIVENTRICULAR IMPLANTABLE CARDIOVERTER-DEFIBRILLATOR (ICD) N/A 2019    Procedure: INSERTION, ICD, BIVENTRICULAR;  Surgeon: Shailesh Eng MD;  Location: [facility] CATH LAB;  Service: Cardiology;  Laterality: N/A;  RN PRE OP 2-6-19  1ST CASE PER  RADHA. NOTIFIED [facility] THAT ANESTHESIA IS NOT PITO FOR 1ST CASE START-LO    INSERTION OF BIVENTRICULAR IMPLANTABLE CARDIOVERTER-DEFIBRILLATOR (ICD) N/A 2020    Procedure: INSERTION, ICD, BIVENTRICULAR;  Surgeon: Jim Kwong MD;  Location: [facility] EP LAB;  Service: Cardiology;  Laterality: N/A;  NICM, CRT-D, SJM,, MAC, DM, 3 Prep*Wearing LifeVest*    oral extraction  2018    TESTICLE SURGERY       Review of patient's allergies indicates:  No Known Allergies    Scheduled Medications:    enoxaparin  40 mg Subcutaneous Q24H    furosemide  80 mg Oral BID    gabapentin  100 mg Oral TID    levoFLOXacin  750 mg Oral Daily    metoprolol succinate  50 mg Oral Daily    phenazopyridine  200 mg Oral TID WM    polyethylene glycol  17 g Oral Daily    pravastatin  80 mg Oral Daily    sacubitriL-valsartan  1 tablet Oral BID    senna-docusate 8.6-50 mg  1 tablet Oral BID    spironolactone  25 mg Oral Daily       PRN Medications: acetaminophen, acetaminophen, dextrose 50%, dextrose 50%, glucagon (human recombinant), glucose, glucose, melatonin, sodium chloride 0.9%    Family History     Problem Relation (Age of Onset)    Epilepsy Mother        Tobacco Use    Smoking status: Former Smoker     Packs/day: 0.50     Years: 40.00     Pack years: 20.00     Types: Cigarettes, Cigars     Quit date:      Years since quittin.7    Smokeless  tobacco: Never Used   Substance and Sexual Activity    Alcohol use: Yes     Comment: once a month    Drug use: No    Sexual activity: Yes     Birth control/protection: None     Comment: uses protection sometimes     Review of Systems   Constitutional: Negative for fatigue and fever.   HENT: Negative for trouble swallowing and voice change.    Respiratory: Negative for cough and shortness of breath.    Cardiovascular: Positive for leg swelling (since last placement). Negative for chest pain.   Gastrointestinal: Negative for abdominal distention and abdominal pain.   Genitourinary: Negative for difficulty urinating and flank pain.   Musculoskeletal: Positive for gait problem. Negative for back pain.   Skin: Negative for color change and rash.   Neurological: Positive for weakness. Negative for speech difficulty and numbness.   Psychiatric/Behavioral: Negative for agitation and confusion.     Objective:     Vital Signs (Most Recent):  Temp: 98.5 °F (36.9 °C) (10/12/20 0730)  Pulse: 86 (10/12/20 0747)  Resp: (!) 26 (10/12/20 0730)  BP: 128/83 (10/12/20 0730)  SpO2: 98 % (10/12/20 0850)    Vital Signs (24h Range):  Temp:  [98.1 °F (36.7 °C)-99.1 °F (37.3 °C)] 98.5 °F (36.9 °C)  Pulse:  [79-93] 86  Resp:  [18-31] 26  SpO2:  [92 %-98 %] 98 %  BP: (108-128)/(70-83) 128/83     Body mass index is 35.08 kg/m².    Physical Exam  Vitals signs and nursing note reviewed.   HENT:      Head: Normocephalic and atraumatic.      Nose: Nose normal.      Mouth/Throat:      Mouth: Mucous membranes are moist.   Eyes:      Extraocular Movements: Extraocular movements intact.      Pupils: Pupils are equal, round, and reactive to light.   Neck:      Musculoskeletal: Normal range of motion.   Pulmonary:      Effort: Pulmonary effort is normal. No respiratory distress.   Musculoskeletal:         General: Swelling (BLE) present. No tenderness or signs of injury.      Comments: weakness present to BLE   Skin:     General: Skin is warm.    Neurological:      Mental Status: He is alert and oriented to person, place, and time. Mental status is at baseline.   Psychiatric:         Mood and Affect: Mood normal.         Behavior: Behavior normal.         Thought Content: Thought content normal.         Judgment: Judgment normal.       NEUROLOGICAL EXAMINATION:     MENTAL STATUS   Oriented to person, place, and time.     CRANIAL NERVES     CN III, IV, VI   Pupils are equal, round, and reactive to light.      Diagnostic Results: Labs: Reviewed  ECG: Reviewed  X-Ray: Reviewed  CT: Reviewed

## 2020-10-12 NOTE — PLAN OF CARE
Pt goals remain appropriate, continue w/ OT POC.    Problem: Occupational Therapy Goal  Goal: Occupational Therapy Goal  Description: Goals to be met by: 10/14/2020    Patient will increase functional independence with ADLs by performing:    UE Dressing with Supervision.  LE Dressing with Minimal Assistance.  Grooming while standing with Contact Guard Assistance.  Supine to sit with Minimal Assistance.  Toilet transfer to toilet with Minimal Assistance.    Outcome: Ongoing, Progressing   Tez SHERWOOD  10/12/2020

## 2020-10-12 NOTE — PT/OT/SLP PROGRESS
Occupational Therapy   Treatment    Name: Papito Bhakta  MRN: [id number]  Admitting Diagnosis:  Acute hypoxemic respiratory failure  14 Days Post-Op    Recommendations:     Discharge Recommendations: rehabilitation facility  Discharge Equipment Recommendations:  walker, rolling  Barriers to discharge:  None    Assessment:     Papito Bhakta is a 65 y.o. male with a medical diagnosis of Acute hypoxemic respiratory failure.  He presents with two weeks post op and still has Pacemaker precautions in place.  Pt had improved mobility and self care.  He was able to complete a BM seated on the toilet w/ Min assistance for clothing management and setup for his hygiene. Pt is motivated to continuing to participate in therapy. Performance deficits affecting function are impaired endurance, impaired self care skills, impaired functional mobilty, impaired balance, impaired cardiopulmonary response to activity, weakness, decreased upper extremity function, gait instability, decreased safety awareness.     Rehab Prognosis:  Good; patient would benefit from acute skilled OT services to address these deficits and reach maximum level of function.       Plan:     Patient to be seen 4 x/week to address the above listed problems via self-care/home management, therapeutic activities, therapeutic exercises  · Plan of Care Expires: 11/29/20  · Plan of Care Reviewed with: patient    Subjective     Pain/Comfort:  · Pain Rating 1: 0/10  · Pain Rating Post-Intervention 1: 0/10    Objective:     Communicated with: RN prior to session.  Patient found up in chair with telemetry, peripheral IV, blood pressure cuff, pulse ox (continuous) upon OT entry to room.    General Precautions: Standard, fall, pacemaker   Orthopedic Precautions:N/A   Braces: N/A     Occupational Performance:     Bed Mobility:    · Pt was up in the room chair at the beginning and end of the session.      Functional Mobility/Transfers:  · Patient completed Sit <> Stand Transfer  with minimum assistance  with  hand-held assist and rolling walker   · Patient completed Toilet Transfer Step Transfer technique with contact guard assistance with  hand-held assist and rolling walker  Functional Mobility:  Pt was able to walk to/from toilet w/ SBA, and RW.      Activities of Daily Living:  · Bathing: stand by assistance standing at sink to wash hand w/ RW  · Toileting: stand by assistance seated on toilet w/ setup and assistance w/ clothing management.       [facility] 6 Click ADL: 19    Treatment & Education:  -Pt edu on OT role/POC  -Importance of OOB activity with staff assistance  -Safety during functional t/f and mobility  - White board updated  - Multiple self care tasks/functional mobility completed-- assistance level noted above  - All questions/concerns answered within OT scope of practice      Patient left up in chair with all lines intact, call button in reach and RN notifiedEducation:      GOALS:   Multidisciplinary Problems     Occupational Therapy Goals        Problem: Occupational Therapy Goal    Goal Priority Disciplines Outcome Interventions   Occupational Therapy Goal     OT, PT/OT Ongoing, Progressing    Description: Goals to be met by: 10/14/2020    Patient will increase functional independence with ADLs by performing:    UE Dressing with Supervision.  LE Dressing with Minimal Assistance.  Grooming while standing with Contact Guard Assistance.  Supine to sit with Minimal Assistance.  Toilet transfer to toilet with Minimal Assistance.                     Time Tracking:     OT Date of Treatment: 10/12/20  OT Start Time: 1352  OT Stop Time: 1418  OT Total Time (min): 26 min    Billable Minutes:Self Care/Home Management 26 mins    Tez Rolon, OT  10/12/2020

## 2020-10-12 NOTE — PLAN OF CARE
Problem: Physical Therapy Goal  Goal: Physical Therapy Goal  Description: Goals to be met by: 10/22/20     Patient will increase functional independence with mobility by performin. Supine to sit with Moderate Assistance.- Met 10/12  Supine to sit with contact guard assist   2. Sit to supine with Moderate Assistance.  3. Sit to stand transfer with Moderate Assistance with LRAD while maintaining Pacemaker precautions.  4. Bed to chair transfer with Moderate Assistance with LRAD while maintaining Pacemaker precautions.  5. Gait  x 10 feet with Moderate Assistance, with LRAD while maintaining Pacemaker precautions.   6. Lower extremity exercise program x 20 reps per handout, with assistance as needed.  7. Pt will recite 3/3 pacemaker precautions and remain compliant with no verbal cueing throughout session    10/12/2020 1314 by Heather Vidal, PT  Outcome: Ongoing, Progressing  10/12/2020 1309 by Heather Vidal, PT  Outcome: Ongoing, Progressing     Continue with plan of care.   Heather Vidal, PT  10/12/2020

## 2020-10-12 NOTE — PT/OT/SLP PROGRESS
"Physical Therapy Treatment    Patient Name:  Papito Bhakta   MRN:  [id number]    Recommendations:     Discharge Recommendations:  rehabilitation facility   Discharge Equipment Recommendations: (TBD pending progress)   Barriers to discharge: Inaccessible home, Decreased caregiver support and risk of falls    Assessment:     Papito Bhakta is a 65 y.o. male admitted with a medical diagnosis of Acute hypoxemic respiratory failure.  He presents with the following impairments/functional limitations:  weakness, impaired endurance, impaired self care skills, decreased upper extremity function, impaired functional mobilty, gait instability, decreased safety awareness, impaired balance, impaired cardiopulmonary response to activity, orthopedic precautions. The patient demonstrates significant improvement in functional mobility this session, however unable to maintain NWB to R UE during transfers and static standing balance. Performed sit to stand with moderate assistance with no AD- unable to achieve full extension through hips/knees/trunk. Patient insistent on using RW for support to stand, putting >10lbs through UE for stand pivot with RW to chair. Sling not at bedside, patient stated it "got lost in the other room". Based on the patient's progress with therapy, motivation to participate in treatment, and prior level of independence, they are an excellent candidate for inpatient rehabilitation and they would benefit from rehab to maximize their functional potential.      Rehab Prognosis: Good; patient would benefit from acute skilled PT services to address these deficits and reach maximum level of function.    Recent Surgery: Procedure(s) (LRB):  INSERTION, ICD, BIVENTRICULAR (N/A) 14 Days Post-Op    Plan:     During this hospitalization, patient to be seen 4 x/week to address the identified rehab impairments via gait training, therapeutic activities, therapeutic exercises, neuromuscular re-education and progress toward the " following goals:    · Plan of Care Expires:  11/07/20    Subjective     Chief Complaint: mild dizziness initially on standing, subsided with rest  Patient/Family Comments/goals: motivated to stand and sit up in chair  Pain/Comfort:  · Pain Rating 1: 0/10      Objective:     Communicated with RN prior to session.  Patient found HOB elevated with peripheral IV, blood pressure cuff, pulse ox (continuous), telemetry upon PT entry to room.     General Precautions: Standard, fall, pacemaker(R pacemaker)   Orthopedic Precautions:N/A   Braces: Sling and swathe     Functional Mobility:    Bed Mobility  Supine to Sit on the L side:  minimum assistance, cued to avoid WBing through R UE, pushing up from bed with L UE   Transfers Sit to Stand:  moderate assistance, cued for pacemaker precautions with sit to stand; 2 reps from EOB; 1 reps from chair; unable to maintain NWB through R UE   Stand pivot bed to chair: moderate assistance with RW, hand resting on RW, unable to sufficiently unweight R UE during t/f in spite of manual/verbal cues            AM-PAC 6 CLICK MOBILITY  Turning over in bed (including adjusting bedclothes, sheets and blankets)?: 3  Sitting down on and standing up from a chair with arms (e.g., wheelchair, bedside commode, etc.): 3  Moving from lying on back to sitting on the side of the bed?: 4  Moving to and from a bed to a chair (including a wheelchair)?: 3  Need to walk in hospital room?: 2  Climbing 3-5 steps with a railing?: 1  Basic Mobility Total Score: 16       Therapeutic Activities and Exercises:   Patient safe to t/f bed to chair with RN and 1 person assist, RN alerted, whiteboard updated.   Wrote pacemaker precautions on the board, NWB R UE.   Patient ed on inability to use R UE on RW due to precautions, patient insistent on attempting to stand with RW in spite of education. RW removed from room. RN alerted to pacemaker precautions. RN ordered new sling for night use.   Performed 2 reps sit to stand  with RW, patient having BM in standing. Once in standing- patient cued to remove R hand from RW- able to maintain balance with contact guard assist and L hand on RW- stood 10 sec, stood 2 min for pericare.         Patient left up in chair with all lines intact, call button in reach, chair alarm on and RN and OT notified..    GOALS:   Multidisciplinary Problems     Physical Therapy Goals        Problem: Physical Therapy Goal    Goal Priority Disciplines Outcome Goal Variances Interventions   Physical Therapy Goal     PT, PT/OT Ongoing, Progressing     Description: Goals to be met by: 10/22/20     Patient will increase functional independence with mobility by performin. Supine to sit with Moderate Assistance.- Met 10/12  Supine to sit with contact guard assist   2. Sit to supine with Moderate Assistance.  3. Sit to stand transfer with Moderate Assistance with LRAD while maintaining Pacemaker precautions.  4. Bed to chair transfer with Moderate Assistance with LRAD while maintaining Pacemaker precautions.  5. Gait  x 10 feet with Moderate Assistance, with LRAD while maintaining Pacemaker precautions.   6. Lower extremity exercise program x 20 reps per handout, with assistance as needed.  7. Pt will recite 3/3 pacemaker precautions and remain compliant with no verbal cueing throughout session                     Time Tracking:     PT Received On: 10/12/20  PT Start Time: 1116     PT Stop Time: 1146  PT Total Time (min): 30 min     Billable Minutes: Therapeutic Activity 30     Treatment Type: Treatment  PT/PTA: PT     PTA Visit Number: 0     Heather Vidal, PT  10/12/2020

## 2020-10-12 NOTE — CARE UPDATE
Called Papito Bhakta 's family member, Annie, to discuss patient's current clinical status and disposition.   Patient and family are now in agreement with Rehab placement. CM/SW sent referrals on 10/10 to rehab facilities. Rehab Orders placed on 10/11. Will continue to coordinate placement with CM/SW.    Addressed family's questions and concerns in addition to updating the current clinical status of the patient. Patient/Family member verbalized understanding of situation and plan.     Patricia Mendiola MD/MS  [facility]   Internal Medicine, PGY-3

## 2020-10-12 NOTE — PROGRESS NOTES
[facility] Hospital Medicine  Progress Note    Patient Name: Papito Bhakta  MRN: [id number]  Patient Class: IP- Inpatient   Admission Date: 9/28/2020  Length of Stay: 12 days  Attending Physician: Fara Moyer MD  Primary Care Provider: Brynn To MD    [facility] Medicine Team: [facility] [place] HOSP MED 3 Jennifer Ledezma MD    Subjective:     Principal Problem:Acute hypoxemic respiratory failure        HPI:  Mr. Bhakta is a 66 yo gentleman with PMH of NICM with AICD and recent R CRT-D placement 9/27, hypertension, HFpEF/HFrEF (EF 25% June 2020), hyperlipidemia, and obesity presenting for shortness of breath and hypoxia. On September 27 he underwent placement of cardiac resynchronization therapy pacemaker with Dr. Kwong. After procedure it was difficult to awaken patient. His pH was 7.26 and CO2 was elevated, and he was subsequently placed on BiPAP then weaned down to room air after diuresis. It was recommended he be admitted for volume overload in the setting of hypoxemia. He has been experiencing increased shortness of breath and lower extremity swelling for the past few months since his bi-V ICD was removed in June 2020 secondary to infection. He reports 2 pillow orthopnea, dyspnea ambulating from his room to the kitchen, and a 20# weight gain. Previously he was more active and able to do household chores and yard work. He has been compliant with his home medications and has been alternating between taking 80 mg of lasix once to twice daily though he was prescribed daily on discharge from the hospital in June. He denies chest pain, fevers, chills, weight loss, nausea, vomiting, diarrhea, melena/hematochezia, and dysuria.     ED/Post-Op Course:  On room air. CXR showed cardiomegaly, pulmonary edema, and pleural effusion. Received 60 mg IV lasix x2 with good urine output and improved respiratory status. CMP revealed increased CO2.     Overview/Hospital Course:  Admitted to hospital medicine for  management of acute hypoxemic respiratory failure likely secondary to acute on chronic combined HFpEF/HFrEF s/p Bi-V ICD placement. Diursed on the floor with 1 day of lasix gtt and IV pushes. CXR with cardiomegaly, cephalization, pleural effusions, and congestion. Repeat EF 18%, G3DD, PAP 50s. Diuresed and HTS following. Had to be transferred to MICU requiring pressor support 2/2 septic shock from acute complicated cystitis of serratia. Started on cefepime. Was weaned off pressors and restarted on GDMT. Awaiting placement for inpatient rehab.     Interval History: No events overnight. HDS on RA. Dysuria improved. Sitting in chair today.     Review of Systems   Constitutional: Negative for activity change, chills, diaphoresis, fatigue, fever and unexpected weight change (weight gain ).   HENT: Negative for facial swelling and trouble swallowing.    Eyes: Negative for visual disturbance.   Respiratory: Negative for apnea, cough, choking, chest tightness, shortness of breath and wheezing.    Cardiovascular: Negative for chest pain, palpitations and leg swelling.   Gastrointestinal: Negative for abdominal distention, abdominal pain, blood in stool, constipation, diarrhea, nausea and vomiting.   Endocrine: Negative for cold intolerance, heat intolerance and polyuria.   Genitourinary: Positive for dysuria and hematuria. Negative for enuresis, frequency and urgency.   Musculoskeletal: Negative for back pain and myalgias.   Skin: Negative for color change, pallor and wound.   Neurological: Negative for dizziness, tremors and weakness.   Hematological: Negative for adenopathy.   Psychiatric/Behavioral: Negative for agitation, behavioral problems and confusion.     Objective:     Vital Signs (Most Recent):  Temp: 98.5 °F (36.9 °C) (10/12/20 0730)  Pulse: 86 (10/12/20 0747)  Resp: (!) 26 (10/12/20 0730)  BP: 128/83 (10/12/20 0730)  SpO2: 98 % (10/12/20 0850) Vital Signs (24h Range):  Temp:  [98.1 °F (36.7 °C)-99.1 °F (37.3  °C)] 98.5 °F (36.9 °C)  Pulse:  [79-93] 86  Resp:  [18-31] 26  SpO2:  [92 %-98 %] 98 %  BP: (108-128)/(70-83) 128/83     Weight: 134.2 kg (295 lb 13.7 oz)  Body mass index is 35.08 kg/m².    Intake/Output Summary (Last 24 hours) at 10/12/2020 1448  Last data filed at 10/12/2020 0830  Gross per 24 hour   Intake 290 ml   Output 300 ml   Net -10 ml      Physical Exam  Vitals signs and nursing note reviewed.   Constitutional:       Appearance: Normal appearance. He is obese. He is not ill-appearing, toxic-appearing or diaphoretic.   HENT:      Head: Normocephalic and atraumatic.      Mouth/Throat:      Mouth: Mucous membranes are moist.      Pharynx: No oropharyngeal exudate.   Eyes:      General: No scleral icterus.     Extraocular Movements: Extraocular movements intact.      Conjunctiva/sclera: Conjunctivae normal.      Pupils: Pupils are equal, round, and reactive to light.   Neck:      Musculoskeletal: Normal range of motion and neck supple.   Cardiovascular:      Rate and Rhythm: Normal rate and regular rhythm.      Pulses: Normal pulses.      Heart sounds: No murmur.   Pulmonary:      Breath sounds: No wheezing.      Comments: Patient currently on RA  Chest:      Chest wall: No tenderness.   Abdominal:      General: Abdomen is flat. Bowel sounds are normal. There is no distension.      Tenderness: There is no abdominal tenderness. There is no guarding or rebound.   Lymphadenopathy:      Cervical: No cervical adenopathy.   Skin:     General: Skin is warm and dry.      Capillary Refill: Capillary refill takes less than 2 seconds.      Comments: Wrinkled, firm, and dark skin of lower extremities to the knees   Neurological:      General: No focal deficit present.      Mental Status: He is alert and oriented to person, place, and time.      Comments: Patient at baseline mental status per daughter         Significant Labs: All pertinent labs within the past 24 hours have been reviewed.    Significant Imaging: I have  reviewed all pertinent imaging results/findings within the past 24 hours.      Assessment/Plan:      * Acute hypoxemic respiratory failure  Non-O2 dependent with need for BiPAP following bi-V ICD placement. Subsequently weaned to RA with diuresis. 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 CXR with pulmonary edema, pleural effusions, and cardiomegaly. Received 2 doses of 60 mg IV lasix s/p procedure.  DDX: Acute heart failure exacerbation  - lasix gtt 10/2, dc 10/3  - improving - euvolemic on exam  - net negative 16 L since admit  - HTS consultation per EP recs, have signed off  - was put back on oxygen during MICU course, weaning off on floor  - BNP down to 200s (500s on admit)    Plan:  - 80 mg lasix PO  - toprol 50 qd  - off midodrine   - start entresto 49/51  - aldactone 25 mg  - K>4, Mg >2 - replete as needed  - goal O2 > 93%  - awaiting acceptance to inpatient rehab facility      Severe sepsis  2/2 acute complicated cystitis speciated to serratia. White count resolved.     - completed 4 days of cefepime  - levaquin 750 mg daily x 3 days  - will dc on PO abx     Chronic combined systolic and diastolic congestive heart failure  History of combined systolic and diastolic dysfunction, s/p CRT-D 9/28 with continued volume overload. Has AICD. Compliant with home medications.    - Please see Acute Hypoxemic Respiratory Failure      RIGOBERTO (acute kidney injury)  RIGOBERTO during MICU course, likely 2/2 hypotension. Resolved    - given resolution of RIGOBERTO will restart lasix  - CMP daily      Essential hypertension  Home meds: carvedilol 12.5 BID, ramipiril, aldactone  - required pressor support and then midodrine in ICU, all bp meds held  - entresto 49-51  - weaned off midodrine  - resume aldactone   - restart toprol      Hyperlipidemia  Continue home pravastatin    NICM (nonischemic cardiomyopathy)  Please see Acute Hypoxemic Respiratory  Failure      Hematuria  New onset hematuria, also dysuria and open cut on genital 2/2 trauma during friend removal    Plan  - repeat UA with RBC > 100 and 3+ occult blood  - pyridium for dysuria  - wound care consultation placed      Biventricular ICD (implantable cardioverter-defibrillator) in place  Placed 9/28 with Dr. Kwong.    - completed prophylactic keflex dose        VTE Risk Mitigation (From admission, onward)         Ordered     enoxaparin injection 40 mg  Every 24 hours      10/09/20 1304     IP VTE HIGH RISK PATIENT  Once      09/29/20 1417     Place sequential compression device  Until discontinued      09/29/20 1417                Discharge Planning   MARIIA: 10/13/2020     Code Status: Full Code   Is the patient medically ready for discharge?: Yes    Reason for patient still in hospital (select all that apply): Pending disposition  Discharge Plan A: Home with family   Discharge Delays: None known at this time        Jennifer Ledezma MD  Department of Hospital Medicine   [facility]                      10/12/2020                             STAFF PHYSICIAN NOTE                                   Attending Attestation for Rounds with Resident  I have reviewed and concur with the resident's history, physical, assessment, and plan.  I have personally interviewed and examined the patient at bedside and agree with the resident's findings.             66 yo gentleman with PMH of NICM with AICD and recent R CRT-D placement 9/27, hypertension, HFpEF/HFrEF (EF 18%) hyperlipidemia, and obesity presenting for shortness of breath and hypoxia s/p AICD and was diuresed with lasix Iv/gtt. Course complicated by urosepsis and transferred to MICU. Now medically ready for discharge, on GDMT and back on RA. Awaiting rehab placement.          Fara Moyer MD  Senior Hospitalist  [id number], [phone]

## 2020-10-12 NOTE — ASSESSMENT & PLAN NOTE
2/2 acute complicated cystitis speciated to serratia. White count resolved.     - completed 4 days of cefepime  - levaquin 750 mg daily x 3 days  - will dc on PO abx

## 2020-10-12 NOTE — CONSULTS
Wound care consult received. Pt known to wound care team with recommendations already in place for skin barrier to healing penis wound. Spoke with nurse and reiterated current wound care orders and cleansing of penis foreskin.   Wound care team to sign off. [id number]    Penis healing wounds-

## 2020-10-12 NOTE — HPI
Papito Bhakta is a 65-year-old male with PMHx of HF, HTN, and HLD. Patient presented to [facility] on 9/28 for a planned R CRT-D placement. Patient admitted after placement acute heart failure. C- xray with pulmonary edema, pleural effusions, and cardiomegaly. Was on Lasix gtt and now all improved and on Lasix PO, began Entresto, and Toprol. Hospital course complicated by hematuria (open cut 2/2 trauma from urinary catheter removal, RIGOBERTO (improving), and sepsis 2/2 acute complicated cystitis speciated to serratia (was on Cefepime will transition to PO abx upon d/c).     Functional History: Patient lives with daughter. Prior to admission, I. KEV: HARINI.

## 2020-10-12 NOTE — ASSESSMENT & PLAN NOTE
- 2/2 acute complicated cystitis speciated to serratia was on Cefepime will transition to PO abx upon d/c per HM

## 2020-10-12 NOTE — SUBJECTIVE & OBJECTIVE
Interval History: No events overnight. HDS on RA. Dysuria improved. Sitting in chair today.     Review of Systems   Constitutional: Negative for activity change, chills, diaphoresis, fatigue, fever and unexpected weight change (weight gain ).   HENT: Negative for facial swelling and trouble swallowing.    Eyes: Negative for visual disturbance.   Respiratory: Negative for apnea, cough, choking, chest tightness, shortness of breath and wheezing.    Cardiovascular: Negative for chest pain, palpitations and leg swelling.   Gastrointestinal: Negative for abdominal distention, abdominal pain, blood in stool, constipation, diarrhea, nausea and vomiting.   Endocrine: Negative for cold intolerance, heat intolerance and polyuria.   Genitourinary: Positive for dysuria and hematuria. Negative for enuresis, frequency and urgency.   Musculoskeletal: Negative for back pain and myalgias.   Skin: Negative for color change, pallor and wound.   Neurological: Negative for dizziness, tremors and weakness.   Hematological: Negative for adenopathy.   Psychiatric/Behavioral: Negative for agitation, behavioral problems and confusion.     Objective:     Vital Signs (Most Recent):  Temp: 98.5 °F (36.9 °C) (10/12/20 0730)  Pulse: 86 (10/12/20 0747)  Resp: (!) 26 (10/12/20 0730)  BP: 128/83 (10/12/20 0730)  SpO2: 98 % (10/12/20 0850) Vital Signs (24h Range):  Temp:  [98.1 °F (36.7 °C)-99.1 °F (37.3 °C)] 98.5 °F (36.9 °C)  Pulse:  [79-93] 86  Resp:  [18-31] 26  SpO2:  [92 %-98 %] 98 %  BP: (108-128)/(70-83) 128/83     Weight: 134.2 kg (295 lb 13.7 oz)  Body mass index is 35.08 kg/m².    Intake/Output Summary (Last 24 hours) at 10/12/2020 1448  Last data filed at 10/12/2020 0830  Gross per 24 hour   Intake 290 ml   Output 300 ml   Net -10 ml      Physical Exam  Vitals signs and nursing note reviewed.   Constitutional:       Appearance: Normal appearance. He is obese. He is not ill-appearing, toxic-appearing or diaphoretic.   HENT:      Head:  Normocephalic and atraumatic.      Mouth/Throat:      Mouth: Mucous membranes are moist.      Pharynx: No oropharyngeal exudate.   Eyes:      General: No scleral icterus.     Extraocular Movements: Extraocular movements intact.      Conjunctiva/sclera: Conjunctivae normal.      Pupils: Pupils are equal, round, and reactive to light.   Neck:      Musculoskeletal: Normal range of motion and neck supple.   Cardiovascular:      Rate and Rhythm: Normal rate and regular rhythm.      Pulses: Normal pulses.      Heart sounds: No murmur.   Pulmonary:      Breath sounds: No wheezing.      Comments: Patient currently on RA  Chest:      Chest wall: No tenderness.   Abdominal:      General: Abdomen is flat. Bowel sounds are normal. There is no distension.      Tenderness: There is no abdominal tenderness. There is no guarding or rebound.   Lymphadenopathy:      Cervical: No cervical adenopathy.   Skin:     General: Skin is warm and dry.      Capillary Refill: Capillary refill takes less than 2 seconds.      Comments: Wrinkled, firm, and dark skin of lower extremities to the knees   Neurological:      General: No focal deficit present.      Mental Status: He is alert and oriented to person, place, and time.      Comments: Patient at baseline mental status per daughter         Significant Labs: All pertinent labs within the past 24 hours have been reviewed.    Significant Imaging: I have reviewed all pertinent imaging results/findings within the past 24 hours.

## 2020-10-12 NOTE — ASSESSMENT & PLAN NOTE
Non-O2 dependent with need for BiPAP following bi-V ICD placement. Subsequently weaned to RA with diuresis. 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 CXR with pulmonary edema, pleural effusions, and cardiomegaly. Received 2 doses of 60 mg IV lasix s/p procedure.  DDX: Acute heart failure exacerbation  - lasix gtt 10/2, dc 10/3  - improving - euvolemic on exam  - net negative 16 L since admit  - HTS consultation per EP recs, have signed off  - was put back on oxygen during MICU course, weaning off on floor  - BNP down to 200s (500s on admit)    Plan:  - 80 mg lasix PO  - toprol 50 qd  - off midodrine   - start entresto 49/51  - aldactone 25 mg  - K>4, Mg >2 - replete as needed  - goal O2 > 93%  - awaiting acceptance to inpatient rehab facility

## 2020-10-12 NOTE — CONSULTS
[facility]  Physical Medicine & Rehab  Consult Note    Patient Name: Papito Bhakta  MRN: [id number]  Admission Date: 9/28/2020  Hospital Length of Stay: 12 days  Attending Physician: Fara Moyer MD     Inpatient consult to Physical Medicine & Rehabilitation  Consult performed by: Deborah Aguayo NP  Consult requested by:  Fara Moyer MD    Collaborating Physician: Mary Anne Ayers MD  Reason for Consult:  Assess rehabilitation needs     Consults  Subjective:     Principal Problem: Acute hypoxemic respiratory failure    HPI: Papito Bhakta is a 65-year-old male with PMHx of HF, HTN, and HLD. Patient [place] [facility] [place]28 for a planned R CRT-D placement. Patient admitted after placement acute heart failure. C- xray with pulmonary edema, pleural effusions, and cardiomegaly. Was on Lasix gtt and now all improved and on Lasix PO, began Entresto, and Toprol. Hospital course complicated by hematuria (open cut 2/2 trauma from urinary catheter removal, RIGOBERTO (improving), and sepsis 2/2 acute complicated cystitis speciated to serratia (was on Cefepime will transition to PO abx upon d/c).     Functional History: Patient lives with daughter. Prior to admission, I. DME: RW.    Hospital Course: 10/8/20: Evaluated by OT & PT. Bed mob [facility]- maxA. Sat WOB 10 mins SBA.     Past Medical History:   Diagnosis Date    RIGOBERTO (acute kidney injury) 10/7/2020    Anticoagulant long-term use     Aspirin    CHF (congestive heart failure)     Hyperlipidemia     Hypertension     NICM (nonischemic cardiomyopathy) 6/16/2020    Obesity      Past Surgical History:   Procedure Laterality Date    INSERTION OF BIVENTRICULAR IMPLANTABLE CARDIOVERTER-DEFIBRILLATOR (ICD) N/A 2/13/2019    Procedure: INSERTION, ICD, BIVENTRICULAR;  Surgeon: Shailesh Eng MD;  Location: [facility] CATH LAB;  Service: Cardiology;  Laterality: N/A;  RN PRE OP 2-6-19  1ST CASE PER  RADHA. NOTIFIED ARDHA THAT ANESTHESIA IS NOT PITO FOR 1ST CASE  START-LO    INSERTION OF BIVENTRICULAR IMPLANTABLE CARDIOVERTER-DEFIBRILLATOR (ICD) N/A 2020    Procedure: INSERTION, ICD, BIVENTRICULAR;  Surgeon: Jim Kwong MD;  Location: [facility];  Service: Cardiology;  Laterality: N/A;  NICM, CRT-D, SJM,, MAC, DM, 3 Prep*Wearing LifeVest*    oral extraction  2018    TESTICLE SURGERY       Review of patient's allergies indicates:  No Known Allergies    Scheduled Medications:    enoxaparin  40 mg Subcutaneous Q24H    furosemide  80 mg Oral BID    gabapentin  100 mg Oral TID    levoFLOXacin  750 mg Oral Daily    metoprolol succinate  50 mg Oral Daily    phenazopyridine  200 mg Oral TID WM    polyethylene glycol  17 g Oral Daily    pravastatin  80 mg Oral Daily    sacubitriL-valsartan  1 tablet Oral BID    senna-docusate 8.6-50 mg  1 tablet Oral BID    spironolactone  25 mg Oral Daily       PRN Medications: acetaminophen, acetaminophen, dextrose 50%, dextrose 50%, glucagon (human recombinant), glucose, glucose, melatonin, sodium chloride 0.9%    Family History     Problem Relation (Age of Onset)    Epilepsy Mother        Tobacco Use    Smoking status: Former Smoker     Packs/day: 0.50     Years: 40.00     Pack years: 20.00     Types: Cigarettes, Cigars     Quit date:      Years since quittin.7    Smokeless tobacco: Never Used   Substance and Sexual Activity    Alcohol use: Yes     Comment: once a month    Drug use: No    Sexual activity: Yes     Birth control/protection: None     Comment: uses protection sometimes     Review of Systems   Constitutional: Negative for fatigue and fever.   HENT: Negative for trouble swallowing and voice change.    Respiratory: Negative for cough and shortness of breath.    Cardiovascular: Positive for leg swelling (since last placement). Negative for chest pain.   Gastrointestinal: Negative for abdominal distention and abdominal pain.   Genitourinary: Negative for difficulty urinating and flank pain.    Musculoskeletal: Positive for gait problem. Negative for back pain.   Skin: Negative for color change and rash.   Neurological: Positive for weakness. Negative for speech difficulty and numbness.   Psychiatric/Behavioral: Negative for agitation and confusion.     Objective:     Vital Signs (Most Recent):  Temp: 98.5 °F (36.9 °C) (10/12/20 0730)  Pulse: 86 (10/12/20 0747)  Resp: (!) 26 (10/12/20 0730)  BP: 128/83 (10/12/20 0730)  SpO2: 98 % (10/12/20 0850)    Vital Signs (24h Range):  Temp:  [98.1 °F (36.7 °C)-99.1 °F (37.3 °C)] 98.5 °F (36.9 °C)  Pulse:  [79-93] 86  Resp:  [18-31] 26  SpO2:  [92 %-98 %] 98 %  BP: (108-128)/(70-83) 128/83     Body mass index is 35.08 kg/m².    Physical Exam  Vitals signs and nursing note reviewed.   HENT:      Head: Normocephalic and atraumatic.      Nose: Nose normal.      Mouth/Throat:      Mouth: Mucous membranes are moist.   Eyes:      Extraocular Movements: Extraocular movements intact.      Pupils: Pupils are equal, round, and reactive to light.   Neck:      Musculoskeletal: Normal range of motion.   Pulmonary:      Effort: Pulmonary effort is normal. No respiratory distress.   Musculoskeletal:         General: Swelling (BLE) present. No tenderness or signs of injury.      Comments: weakness present to BLE   Skin:     General: Skin is warm.   Neurological:      Mental Status: He is alert and oriented to person, place, and time. Mental status is at baseline.   Psychiatric:         Mood and Affect: Mood normal.         Behavior: Behavior normal.         Thought Content: Thought content normal.         Judgment: Judgment normal.       Diagnostic Results:   Labs: Reviewed  ECG: Reviewed  X-Ray: Reviewed  CT: Reviewed    Assessment/Plan:     * Acute hypoxemic respiratory failure  - C- xray with pulmonary edema, pleural effusions, and cardiomegaly.  - Was on Lasix gtt and now all improved and on Lasix PO    Hematuria  - open cut 2/2 trauma from urinary catheter removal      RIGOBERTO  (acute kidney injury)  improving    Severe sepsis  - 2/2 acute complicated cystitis speciated to serratia was on Cefepime will transition to PO abx upon d/c per      Acute on chronic combined systolic and diastolic heart failure  - began Entresto and on Toprol    - Related to prolonged/acute hospital course.     Recommendations  -  Encourage mobility, OOB in chair at least 3 hours per day, and early ambulation as appropriate  -  PT/OT evaluate and treat  -  Pain management  -  Monitor for and prevent skin breakdown and pressure ulcers  · Early mobility, repositioning/weight shifting every 20-30 minutes when sitting, turn patient every 2 hours, proper mattress/overlay and chair cushioning, pressure relief/heel protector boots  -  DVT prophylaxis    -  Reviewed discharge options (IP rehab, SNF, HH therapy, and OP therapy)    Recommend Inpatient Rehab.      Thank you for your consult.     Deborah Aguayo NP  Department of Physical Medicine & Rehab  [facility]

## 2020-10-13 LAB
ALBUMIN SERPL BCP-MCNC: 2.3 G/DL (ref 3.5–5.2)
ALP SERPL-CCNC: 99 U/L (ref 55–135)
ALT SERPL W/O P-5'-P-CCNC: 28 U/L (ref 10–44)
ANION GAP SERPL CALC-SCNC: 9 MMOL/L (ref 8–16)
ANISOCYTOSIS BLD QL SMEAR: SLIGHT
AST SERPL-CCNC: 40 U/L (ref 10–40)
BASOPHILS # BLD AUTO: 0.06 K/UL (ref 0–0.2)
BASOPHILS NFR BLD: 0.7 % (ref 0–1.9)
BILIRUB SERPL-MCNC: 0.7 MG/DL (ref 0.1–1)
BUN SERPL-MCNC: 17 MG/DL (ref 8–23)
CALCIUM SERPL-MCNC: 9.2 MG/DL (ref 8.7–10.5)
CHLORIDE SERPL-SCNC: 101 MMOL/L (ref 95–110)
CO2 SERPL-SCNC: 25 MMOL/L (ref 23–29)
CREAT SERPL-MCNC: 0.9 MG/DL (ref 0.5–1.4)
DIFFERENTIAL METHOD: ABNORMAL
EOSINOPHIL # BLD AUTO: 0.3 K/UL (ref 0–0.5)
EOSINOPHIL NFR BLD: 3.3 % (ref 0–8)
ERYTHROCYTE [DISTWIDTH] IN BLOOD BY AUTOMATED COUNT: 16.8 % (ref 11.5–14.5)
EST. GFR  (AFRICAN AMERICAN): >60 ML/MIN/1.73 M^2
EST. GFR  (NON AFRICAN AMERICAN): >60 ML/MIN/1.73 M^2
GIANT PLATELETS BLD QL SMEAR: PRESENT
GLUCOSE SERPL-MCNC: 94 MG/DL (ref 70–110)
HCT VFR BLD AUTO: 41.7 % (ref 40–54)
HGB BLD-MCNC: 12.5 G/DL (ref 14–18)
HYPOCHROMIA BLD QL SMEAR: ABNORMAL
IMM GRANULOCYTES # BLD AUTO: 0.09 K/UL (ref 0–0.04)
IMM GRANULOCYTES NFR BLD AUTO: 1.1 % (ref 0–0.5)
LYMPHOCYTES # BLD AUTO: 2.4 K/UL (ref 1–4.8)
LYMPHOCYTES NFR BLD: 29.5 % (ref 18–48)
MAGNESIUM SERPL-MCNC: 1.8 MG/DL (ref 1.6–2.6)
MCH RBC QN AUTO: 22.7 PG (ref 27–31)
MCHC RBC AUTO-ENTMCNC: 30 G/DL (ref 32–36)
MCV RBC AUTO: 76 FL (ref 82–98)
MONOCYTES # BLD AUTO: 1.1 K/UL (ref 0.3–1)
MONOCYTES NFR BLD: 13.8 % (ref 4–15)
NEUTROPHILS # BLD AUTO: 4.2 K/UL (ref 1.8–7.7)
NEUTROPHILS NFR BLD: 51.6 % (ref 38–73)
NRBC BLD-RTO: 0 /100 WBC
OVALOCYTES BLD QL SMEAR: ABNORMAL
PHOSPHATE SERPL-MCNC: 3.3 MG/DL (ref 2.7–4.5)
PLATELET # BLD AUTO: 220 K/UL (ref 150–350)
PLATELET BLD QL SMEAR: ABNORMAL
PMV BLD AUTO: 11.8 FL (ref 9.2–12.9)
POIKILOCYTOSIS BLD QL SMEAR: SLIGHT
POLYCHROMASIA BLD QL SMEAR: ABNORMAL
POTASSIUM SERPL-SCNC: 4.1 MMOL/L (ref 3.5–5.1)
PROT SERPL-MCNC: 7.2 G/DL (ref 6–8.4)
RBC # BLD AUTO: 5.5 M/UL (ref 4.6–6.2)
SODIUM SERPL-SCNC: 135 MMOL/L (ref 136–145)
WBC # BLD AUTO: 8.17 K/UL (ref 3.9–12.7)

## 2020-10-13 PROCEDURE — 97116 GAIT TRAINING THERAPY: CPT | Mod: HCNC

## 2020-10-13 PROCEDURE — 36415 COLL VENOUS BLD VENIPUNCTURE: CPT | Mod: HCNC

## 2020-10-13 PROCEDURE — 94660 CPAP INITIATION&MGMT: CPT | Mod: HCNC

## 2020-10-13 PROCEDURE — 25000003 PHARM REV CODE 250: Mod: HCNC | Performed by: STUDENT IN AN ORGANIZED HEALTH CARE EDUCATION/TRAINING PROGRAM

## 2020-10-13 PROCEDURE — 97535 SELF CARE MNGMENT TRAINING: CPT | Mod: HCNC

## 2020-10-13 PROCEDURE — 99900035 HC TECH TIME PER 15 MIN (STAT): Mod: HCNC

## 2020-10-13 PROCEDURE — 63600175 PHARM REV CODE 636 W HCPCS: Mod: HCNC | Performed by: STUDENT IN AN ORGANIZED HEALTH CARE EDUCATION/TRAINING PROGRAM

## 2020-10-13 PROCEDURE — 99232 PR SUBSEQUENT HOSPITAL CARE,LEVL II: ICD-10-PCS | Mod: HCNC,,, | Performed by: HOSPITALIST

## 2020-10-13 PROCEDURE — 80053 COMPREHEN METABOLIC PANEL: CPT | Mod: HCNC

## 2020-10-13 PROCEDURE — 20600001 HC STEP DOWN PRIVATE ROOM: Mod: HCNC

## 2020-10-13 PROCEDURE — 94761 N-INVAS EAR/PLS OXIMETRY MLT: CPT | Mod: HCNC

## 2020-10-13 PROCEDURE — 99232 SBSQ HOSP IP/OBS MODERATE 35: CPT | Mod: HCNC,,, | Performed by: HOSPITALIST

## 2020-10-13 PROCEDURE — 85025 COMPLETE CBC W/AUTO DIFF WBC: CPT | Mod: HCNC

## 2020-10-13 PROCEDURE — 83735 ASSAY OF MAGNESIUM: CPT | Mod: HCNC

## 2020-10-13 PROCEDURE — 97530 THERAPEUTIC ACTIVITIES: CPT | Mod: HCNC

## 2020-10-13 PROCEDURE — 25000003 PHARM REV CODE 250: Mod: HCNC | Performed by: NURSE PRACTITIONER

## 2020-10-13 PROCEDURE — 84100 ASSAY OF PHOSPHORUS: CPT | Mod: HCNC

## 2020-10-13 RX ORDER — LEVOFLOXACIN 750 MG/1
750 TABLET ORAL DAILY
Qty: 1 TABLET
Start: 2020-10-13 | End: 2020-10-14 | Stop reason: HOSPADM

## 2020-10-13 RX ORDER — MAGNESIUM SULFATE HEPTAHYDRATE 40 MG/ML
2 INJECTION, SOLUTION INTRAVENOUS ONCE
Status: COMPLETED | OUTPATIENT
Start: 2020-10-13 | End: 2020-10-13

## 2020-10-13 RX ADMIN — SACUBITRIL AND VALSARTAN 1 TABLET: 49; 51 TABLET, FILM COATED ORAL at 10:10

## 2020-10-13 RX ADMIN — GABAPENTIN 100 MG: 100 CAPSULE ORAL at 09:10

## 2020-10-13 RX ADMIN — GABAPENTIN 100 MG: 100 CAPSULE ORAL at 08:10

## 2020-10-13 RX ADMIN — PRAVASTATIN SODIUM 80 MG: 40 TABLET ORAL at 08:10

## 2020-10-13 RX ADMIN — GABAPENTIN 100 MG: 100 CAPSULE ORAL at 03:10

## 2020-10-13 RX ADMIN — DOCUSATE SODIUM 50MG AND SENNOSIDES 8.6MG 1 TABLET: 8.6; 5 TABLET, FILM COATED ORAL at 09:10

## 2020-10-13 RX ADMIN — DOCUSATE SODIUM 50MG AND SENNOSIDES 8.6MG 1 TABLET: 8.6; 5 TABLET, FILM COATED ORAL at 08:10

## 2020-10-13 RX ADMIN — FUROSEMIDE 80 MG: 80 TABLET ORAL at 06:10

## 2020-10-13 RX ADMIN — FUROSEMIDE 80 MG: 80 TABLET ORAL at 08:10

## 2020-10-13 RX ADMIN — SPIRONOLACTONE 25 MG: 25 TABLET ORAL at 08:10

## 2020-10-13 RX ADMIN — METOPROLOL SUCCINATE 50 MG: 50 TABLET, EXTENDED RELEASE ORAL at 08:10

## 2020-10-13 RX ADMIN — MAGNESIUM SULFATE IN WATER 2 G: 40 INJECTION, SOLUTION INTRAVENOUS at 09:10

## 2020-10-13 RX ADMIN — LEVOFLOXACIN 750 MG: 750 TABLET, FILM COATED ORAL at 08:10

## 2020-10-13 RX ADMIN — SACUBITRIL AND VALSARTAN 1 TABLET: 49; 51 TABLET, FILM COATED ORAL at 08:10

## 2020-10-13 RX ADMIN — ENOXAPARIN SODIUM 40 MG: 40 INJECTION SUBCUTANEOUS at 05:10

## 2020-10-13 NOTE — ASSESSMENT & PLAN NOTE
2/2 acute complicated cystitis speciated to serratia. White count resolved.     - completed 4 days of cefepime  - levaquin 750 mg daily x 3 days (day 2)  - will dc on PO abx

## 2020-10-13 NOTE — CARE UPDATE
Rapid Response Nurse Chart Check     Chart check completed, abnormal VS noted. bedside RNFlower contacted. No concerns verbalized at this time. Instructed to call [phone] for further concerns or assistance.

## 2020-10-13 NOTE — PLAN OF CARE
Problem: Occupational Therapy Goal  Goal: Occupational Therapy Goal  Description: Goals to be met by: 10/14/2020    Patient will increase functional independence with ADLs by performing:    UE Dressing with Supervision.  LE Dressing with Minimal Assistance.  Grooming while standing with Contact Guard Assistance.  Supine to sit with Minimal Assistance.  Toilet transfer to toilet with Minimal Assistance.  Pt to recite 3/3 pacemaker precautions.    Goals remain appropriate. Continue OT as tolerated.  Carmen Jaimes OT  10/13/2020    Outcome: Ongoing, Progressing

## 2020-10-13 NOTE — NURSING
Tele called to get tele box. No boxes available. Pt put on list for tele box. Continuing bedside monitoring.

## 2020-10-13 NOTE — PLAN OF CARE
Call placed to patient's daughter Annie ([phone]) to discuss her father's discharge plan. Left voicemail requesting callback. Will continue to follow.    Leia Gallegos RN  Ext [phone]

## 2020-10-13 NOTE — PT/OT/SLP PROGRESS
Physical Therapy Treatment    Co-tx with OT due to level of skilled therapist assist needed to maintain precautions while safely mobilizing    Patient Name:  Papito Bhakta   MRN:  [id number]    Recommendations:     Discharge Recommendations:  rehabilitation facility   Discharge Equipment Recommendations: walker, rolling   Barriers to discharge: Inaccessible home, Decreased caregiver support and risk of falls    Assessment:     Papito Bhakta is a 65 y.o. male admitted with a medical diagnosis of Acute hypoxemic respiratory failure.  He presents with the following impairments/functional limitations:  impaired functional mobilty, weakness, gait instability, impaired endurance, impaired balance, impaired self care skills, decreased lower extremity function, impaired cognition, decreased safety awareness. The patient demonstrates impaired insight into deficits and precautions, inability to maintain precautions consistent with mobility tasks in spite of max cuing. Re-iterated precautions and methods to mobilize while maintaining precautions- showing improvement in ability to mobilize with decreased use of R UE for support. Performed sit to stand with therapist maintaining NWB R UE with moderate assistance progressing to minimum assistance. Ambulated 8' with DOROTHY HHA and moderate assistance x2, unable to maintain R UE NWB- pushing through therapist's R hand for support.  Based on the patient's progress with therapy, motivation to participate in treatment, and prior level of independence, they are an excellent candidate for inpatient rehabilitation and they would benefit from rehab to maximize their functional potential.      Rehab Prognosis: Good and Fair; patient would benefit from acute skilled PT services to address these deficits and reach maximum level of function.    Recent Surgery: Procedure(s) (LRB):  INSERTION, ICD, BIVENTRICULAR (N/A) 15 Days Post-Op    Plan:     During this hospitalization, patient to be seen 4  "x/week to address the identified rehab impairments via gait training, therapeutic activities, therapeutic exercises, neuromuscular re-education and progress toward the following goals:    · Plan of Care Expires:  11/07/20    Subjective     Chief Complaint: "I need to take a break", "you can leave a walker in here, I won't get up and use it"  Patient/Family Comments/goals: go to rehab   Pain/Comfort:  · Pain Rating 1: 0/10      Objective:     Communicated with RN prior to session.  Patient found found standing in bathroom with RW for support with peripheral IV, telemetry, blood pressure cuff, pulse ox (continuous) upon PT entry to room.     General Precautions: Standard, pacemaker, fall   Orthopedic Precautions:N/A   Braces: N/A     Functional Mobility:    Found up with RN   Transfers Sit to Stand:  moderate assistance 1st attempt from bedside chair, minimum assistance 2nd attempt  -PT holding R UE to prevent WBing to push to stand  -Cued to scoot to edge of chair  -Cued for foot placement  -Demonstrated and cued for anterior rocking and use of L UE for pushing to stand  -Patient showed improvement in understanding, unable to maintain NWB R UE precautions without therapist manually assisting   Gait  Gait Distance: 8 ft with DOROTHY HHA  Assistance Level: moderate assistance x2   Description: flexed at hips, impaired weight shift, forefoot contact, minimal hip flexion in swing phase, unable to maintain R UE NWB    Gait training: cued and facilitation for weight shift with stepping, cued for R UE NWB, cues for upright posture, cued for reciprocal strides           AM-PAC 6 CLICK MOBILITY  Turning over in bed (including adjusting bedclothes, sheets and blankets)?: 3  Sitting down on and standing up from a chair with arms (e.g., wheelchair, bedside commode, etc.): 3  Moving from lying on back to sitting on the side of the bed?: 3  Moving to and from a bed to a chair (including a wheelchair)?: 2  Need to walk in hospital " room?: 2  Climbing 3-5 steps with a railing?: 1  Basic Mobility Total Score: 14       Therapeutic Activities and Exercises:   Patient safe to t/f with RN assist of 1 person with NO AD, whiteboard updated and with Pacemaker precautions. Clarified with RN that patient cannot use R UE to push to stand, he is not able to use an AD for gait. Removed RW from room. Re-iterated that he is not able to use AD, with precautions.   Re-iterated pacemaker precautions and as they relate to mobility, patient verbalized that he is aware that he is not supposed to use R UE during transfers or with use of AD; however he needs max cues and assist with tasks to maintain them consistently during mobility.     Patient ed on importance of sitting up in chair 3x/day to prevent deconditioning, patient in agreement to call for RN assist to t/f back to bed.   Patient educated on role of therapy, goals of session, benefits of out of bed mobility. Patient agreeable to mobilize with therapy.      Patient left up in chair with all lines intact, call button in reach, chair alarm on and RN notified..    GOALS:   Multidisciplinary Problems     Physical Therapy Goals        Problem: Physical Therapy Goal    Goal Priority Disciplines Outcome Goal Variances Interventions   Physical Therapy Goal     PT, PT/OT Ongoing, Progressing     Description: Goals to be met by: 10/22/20     Patient will increase functional independence with mobility by performin. Supine to sit with Moderate Assistance.- Met 10/12  Supine to sit with contact guard assist   2. Sit to supine with Moderate Assistance.  3. Sit to stand transfer with Moderate Assistance with LRAD while maintaining Pacemaker precautions.  4. Bed to chair transfer with Moderate Assistance with LRAD while maintaining Pacemaker precautions.  5. Gait  x 10 feet with Moderate Assistance, with LRAD while maintaining Pacemaker precautions.   6. Lower extremity exercise program x 20 reps per handout, with  assistance as needed.  7. Pt will recite 3/3 pacemaker precautions and remain compliant with no verbal cueing throughout session                     Time Tracking:     PT Received On: 10/13/20  PT Start Time: 1103     PT Stop Time: 1135  PT Total Time (min): 32 min     Billable Minutes: Gait Training 15 and Therapeutic Activity 15 (co-tx with OT)    Treatment Type: Treatment  PT/PTA: PT     PTA Visit Number: 0     Heather Vidal, PT  10/13/2020

## 2020-10-13 NOTE — ASSESSMENT & PLAN NOTE
Non-O2 dependent with need for BiPAP following bi-V ICD placement. Subsequently weaned to RA with diuresis. 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 CXR with pulmonary edema, pleural effusions, and cardiomegaly. Received 2 doses of 60 mg IV lasix s/p procedure.  DDX: Acute heart failure exacerbation  - lasix gtt 10/2, dc 10/3  - improving - euvolemic on exam  - net negative 16 L since admit  - HTS consultation per EP recs, have signed off  - was put back on oxygen during MICU course, weaning off on floor  - BNP down to 200s (500s on admit)    Plan:  - 80 mg lasix PO  - toprol 50 qd  - off midodrine   - start entresto 49/51  - aldactone 25 mg  - K>4, Mg >2 - replete as needed  - goal O2 > 93%    Patient accepted to [facility] - waiting for prior authorization

## 2020-10-13 NOTE — PLAN OF CARE
Patient has been accepted to [facility], pending Authorization for placement. CM team will continue to follow.    Tasia Costa LMSW  Case Management   [facility]   Ext. [phone]

## 2020-10-13 NOTE — PLAN OF CARE
10/13/20 1358   Discharge Reassessment   Assessment Type Discharge Planning Reassessment   Provided patient/caregiver education on the expected discharge date and the discharge plan Yes   Discharge Plan A Rehab  ([facility])   Discharge Plan B Home Health   DME Needed Upon Discharge  other (see comments)  (TBD)   Anticipated Discharge Disposition Rehab   Post-Acute Status   Post-Acute Authorization Placement   Post-Acute Placement Status Pending Payor Review

## 2020-10-13 NOTE — SUBJECTIVE & OBJECTIVE
Interval History: No events overnight. HDS on RA. UO 1.3 L. Up in the chair today with PT/OT. Denies chest pain, dyspnea, and cough.     Review of Systems   Constitutional: Negative for activity change, chills, diaphoresis, fatigue, fever and unexpected weight change (weight gain ).   HENT: Negative for facial swelling and trouble swallowing.    Eyes: Negative for visual disturbance.   Respiratory: Negative for apnea, cough, choking, chest tightness, shortness of breath and wheezing.    Cardiovascular: Negative for chest pain, palpitations and leg swelling.   Gastrointestinal: Negative for abdominal distention, abdominal pain, blood in stool, constipation, diarrhea, nausea and vomiting.   Endocrine: Negative for cold intolerance, heat intolerance and polyuria.   Genitourinary: Positive for dysuria and hematuria. Negative for enuresis, frequency and urgency.   Musculoskeletal: Negative for back pain and myalgias.   Skin: Negative for color change, pallor and wound.   Neurological: Negative for dizziness, tremors and weakness.   Hematological: Negative for adenopathy.   Psychiatric/Behavioral: Negative for agitation, behavioral problems and confusion.     Objective:     Vital Signs (Most Recent):  Temp: 97.8 °F (36.6 °C) (10/13/20 1149)  Pulse: 86 (10/13/20 1149)  Resp: 18 (10/13/20 1149)  BP: 109/79 (10/13/20 1149)  SpO2: 97 % (10/13/20 1149) Vital Signs (24h Range):  Temp:  [97.7 °F (36.5 °C)-98.7 °F (37.1 °C)] 97.8 °F (36.6 °C)  Pulse:  [] 86  Resp:  [10-34] 18  SpO2:  [91 %-98 %] 97 %  BP: ()/(61-91) 109/79     Weight: 134.2 kg (295 lb 13.7 oz)  Body mass index is 35.08 kg/m².    Intake/Output Summary (Last 24 hours) at 10/13/2020 1350  Last data filed at 10/13/2020 1200  Gross per 24 hour   Intake 840 ml   Output 1350 ml   Net -510 ml      Physical Exam  Vitals signs and nursing note reviewed.   Constitutional:       Appearance: Normal appearance. He is obese. He is not ill-appearing, toxic-appearing  or diaphoretic.   HENT:      Head: Normocephalic and atraumatic.      Mouth/Throat:      Mouth: Mucous membranes are moist.      Pharynx: No oropharyngeal exudate.   Eyes:      General: No scleral icterus.     Extraocular Movements: Extraocular movements intact.      Conjunctiva/sclera: Conjunctivae normal.      Pupils: Pupils are equal, round, and reactive to light.   Neck:      Musculoskeletal: Normal range of motion and neck supple.   Cardiovascular:      Rate and Rhythm: Normal rate and regular rhythm.      Pulses: Normal pulses.      Heart sounds: No murmur.   Pulmonary:      Breath sounds: No wheezing.      Comments: Patient currently on RA  Chest:      Chest wall: No tenderness.   Abdominal:      General: Abdomen is flat. Bowel sounds are normal. There is no distension.      Tenderness: There is no abdominal tenderness. There is no guarding or rebound.   Lymphadenopathy:      Cervical: No cervical adenopathy.   Skin:     General: Skin is warm and dry.      Capillary Refill: Capillary refill takes less than 2 seconds.      Comments: Wrinkled, firm, and dark skin of lower extremities to the knees   Neurological:      General: No focal deficit present.      Mental Status: He is alert and oriented to person, place, and time.      Comments: Patient at baseline mental status per daughter         Significant Labs: All pertinent labs within the past 24 hours have been reviewed.    Significant Imaging: I have reviewed all pertinent imaging results/findings within the past 24 hours.

## 2020-10-13 NOTE — PROGRESS NOTES
[facility] Hospital Medicine  Progress Note    Patient Name: Papito Bhakta  MRN: [id number]  Patient Class: IP- Inpatient   Admission Date: 9/28/2020  Length of Stay: 13 days  Attending Physician: Fara Moyer MD  Primary Care Provider: Brynn To MD    [facility] Medicine Team: [facility] HOSP MED 3 Jennifer Ledezma MD    Subjective:     Principal Problem:Acute hypoxemic respiratory failure        HPI:  Mr. Bhakta is a 64 yo gentleman with PMH of NICM with AICD and recent R CRT-D placement 9/27, hypertension, HFpEF/HFrEF (EF 25% June 2020), hyperlipidemia, and obesity presenting for shortness of breath and hypoxia. On September 27 he underwent placement of cardiac resynchronization therapy pacemaker with Dr. Kwong. After procedure it was difficult to awaken patient. His pH was 7.26 and CO2 was elevated, and he was subsequently placed on BiPAP then weaned down to room air after diuresis. It was recommended he be admitted for volume overload in the setting of hypoxemia. He has been experiencing increased shortness of breath and lower extremity swelling for the past few months since his bi-V ICD was removed in June 2020 secondary to infection. He reports 2 pillow orthopnea, dyspnea ambulating from his room to the kitchen, and a 20# weight gain. Previously he was more active and able to do household chores and yard work. He has been compliant with his home medications and has been alternating between taking 80 mg of lasix once to twice daily though he was prescribed daily on discharge from the hospital in June. He denies chest pain, fevers, chills, weight loss, nausea, vomiting, diarrhea, melena/hematochezia, and dysuria.     ED/Post-Op Course:  On room air. CXR showed cardiomegaly, pulmonary edema, and pleural effusion. Received 60 mg IV lasix x2 with good urine output and improved respiratory status. CMP revealed increased CO2.     Overview/Hospital Course:  Admitted to hospital medicine for  management of acute hypoxemic respiratory failure likely secondary to acute on chronic combined HFpEF/HFrEF s/p Bi-V ICD placement. Diursed on the floor with 1 day of lasix gtt and IV pushes. CXR with cardiomegaly, cephalization, pleural effusions, and congestion. Repeat EF 18%, G3DD, PAP 50s. Diuresed and HTS following. Had to be transferred to MICU requiring pressor support 2/2 septic shock from acute complicated cystitis of serratia. Started on cefepime. Was weaned off pressors and restarted on GDMT. Awaiting placement for inpatient rehab.     Interval History: No events overnight. HDS on RA. UO 1.3 L. Up in the chair today with PT/OT. Denies chest pain, dyspnea, and cough.     Review of Systems   Constitutional: Negative for activity change, chills, diaphoresis, fatigue, fever and unexpected weight change (weight gain ).   HENT: Negative for facial swelling and trouble swallowing.    Eyes: Negative for visual disturbance.   Respiratory: Negative for apnea, cough, choking, chest tightness, shortness of breath and wheezing.    Cardiovascular: Negative for chest pain, palpitations and leg swelling.   Gastrointestinal: Negative for abdominal distention, abdominal pain, blood in stool, constipation, diarrhea, nausea and vomiting.   Endocrine: Negative for cold intolerance, heat intolerance and polyuria.   Genitourinary: Positive for dysuria and hematuria. Negative for enuresis, frequency and urgency.   Musculoskeletal: Negative for back pain and myalgias.   Skin: Negative for color change, pallor and wound.   Neurological: Negative for dizziness, tremors and weakness.   Hematological: Negative for adenopathy.   Psychiatric/Behavioral: Negative for agitation, behavioral problems and confusion.     Objective:     Vital Signs (Most Recent):  Temp: 97.8 °F (36.6 °C) (10/13/20 1149)  Pulse: 86 (10/13/20 1149)  Resp: 18 (10/13/20 1149)  BP: 109/79 (10/13/20 1149)  SpO2: 97 % (10/13/20 1149) Vital Signs (24h Range):  Temp:   [97.7 °F (36.5 °C)-98.7 °F (37.1 °C)] 97.8 °F (36.6 °C)  Pulse:  [] 86  Resp:  [10-34] 18  SpO2:  [91 %-98 %] 97 %  BP: ()/(61-91) 109/79     Weight: 134.2 kg (295 lb 13.7 oz)  Body mass index is 35.08 kg/m².    Intake/Output Summary (Last 24 hours) at 10/13/2020 1350  Last data filed at 10/13/2020 1200  Gross per 24 hour   Intake 840 ml   Output 1350 ml   Net -510 ml      Physical Exam  Vitals signs and nursing note reviewed.   Constitutional:       Appearance: Normal appearance. He is obese. He is not ill-appearing, toxic-appearing or diaphoretic.   HENT:      Head: Normocephalic and atraumatic.      Mouth/Throat:      Mouth: Mucous membranes are moist.      Pharynx: No oropharyngeal exudate.   Eyes:      General: No scleral icterus.     Extraocular Movements: Extraocular movements intact.      Conjunctiva/sclera: Conjunctivae normal.      Pupils: Pupils are equal, round, and reactive to light.   Neck:      Musculoskeletal: Normal range of motion and neck supple.   Cardiovascular:      Rate and Rhythm: Normal rate and regular rhythm.      Pulses: Normal pulses.      Heart sounds: No murmur.   Pulmonary:      Breath sounds: No wheezing.      Comments: Patient currently on RA  Chest:      Chest wall: No tenderness.   Abdominal:      General: Abdomen is flat. Bowel sounds are normal. There is no distension.      Tenderness: There is no abdominal tenderness. There is no guarding or rebound.   Lymphadenopathy:      Cervical: No cervical adenopathy.   Skin:     General: Skin is warm and dry.      Capillary Refill: Capillary refill takes less than 2 seconds.      Comments: Wrinkled, firm, and dark skin of lower extremities to the knees   Neurological:      General: No focal deficit present.      Mental Status: He is alert and oriented to person, place, and time.      Comments: Patient at baseline mental status per daughter         Significant Labs: All pertinent labs within the past 24 hours have been  reviewed.    Significant Imaging: I have reviewed all pertinent imaging results/findings within the past 24 hours.      Assessment/Plan:      * Acute hypoxemic respiratory failure  Non-O2 dependent with need for BiPAP following bi-V ICD placement. Subsequently weaned to RA with diuresis. 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 CXR with pulmonary edema, pleural effusions, and cardiomegaly. Received 2 doses of 60 mg IV lasix s/p procedure.  DDX: Acute heart failure exacerbation  - lasix gtt 10/2, dc 10/3  - improving - euvolemic on exam  - net negative 16 L since admit  - HTS consultation per EP recs, have signed off  - was put back on oxygen during MICU course, weaning off on floor  - BNP down to 200s (500s on admit)    Plan:  - 80 mg lasix PO  - toprol 50 qd  - off midodrine   - start entresto 49/51  - aldactone 25 mg  - K>4, Mg >2 - replete as needed  - goal O2 > 93%    Patient accepted to [facility] - waiting for prior authorization      Severe sepsis  2/2 acute complicated cystitis speciated to serratia. White count resolved.     - completed 4 days of cefepime  - levaquin 750 mg daily x 3 days (day 2)  - will dc on PO abx     Chronic combined systolic and diastolic congestive heart failure  History of combined systolic and diastolic dysfunction, s/p CRT-D 9/28 with continued volume overload. Has AICD. Compliant with home medications.    - Please see Acute Hypoxemic Respiratory Failure      RIGOBERTO (acute kidney injury)  RIGOBERTO during MICU course, likely 2/2 hypotension. Resolved    - given resolution of RIGOBERTO will restart lasix  - CMP daily      Essential hypertension  Home meds: carvedilol 12.5 BID, ramipiril, aldactone  - required pressor support and then midodrine in ICU, all bp meds held  - entresto 49-51  - weaned off midodrine  - resume aldactone   - restart toprol      Hyperlipidemia  Continue home pravastatin    NICM (nonischemic  cardiomyopathy)  Please see Acute Hypoxemic Respiratory Failure      Hematuria  New onset hematuria, also dysuria and open cut on genital 2/2 trauma during friend removal    Plan  - repeat UA with RBC > 100 and 3+ occult blood  - pyridium for dysuria  - wound care consultation placed      Biventricular ICD (implantable cardioverter-defibrillator) in place  Placed 9/28 with Dr. Kwong.    - completed prophylactic keflex dose        VTE Risk Mitigation (From admission, onward)         Ordered     enoxaparin injection 40 mg  Every 24 hours      10/09/20 1304     IP VTE HIGH RISK PATIENT  Once      09/29/20 1417     Place sequential compression device  Until discontinued      09/29/20 1417                Discharge Planning   MARIIA: 10/13/2020     Code Status: Full Code   Is the patient medically ready for discharge?: Yes    Reason for patient still in hospital (select all that apply): Pending disposition  Discharge Plan A: Home with family   Discharge Delays: None known at this time          Jennifer Ledezma MD  Department of Hospital Medicine   [facility]                      10/13/2020                             STAFF PHYSICIAN NOTE                                   Attending Attestation for Rounds with Resident  I have reviewed and concur with the resident's history, physical, assessment, and plan.  I have personally interviewed and examined the patient at bedside and agree with the resident's findings.                                     66 yo gentleman with PMH of NICM with AICD and recent R CRT-D placement 9/27, hypertension, HFpEF/HFrEF (EF 18%) hyperlipidemia, and obesity presenting for shortness of breath and hypoxia s/p AICD and was diuresed with lasix Iv/gtt. Course complicated by urosepsis and transferred to MICU. on GDMT and back on RA. [facility]DC-Awaiting rehab placement.     Fara Moyer MD  Senior Hospitalist  [id number], [phone]

## 2020-10-13 NOTE — PT/OT/SLP PROGRESS
Occupational Therapy   Treatment    Name: Papito Bhakta  MRN: [id number]  Admitting Diagnosis:  Acute hypoxemic respiratory failure  15 Days Post-Op    Recommendations:     Discharge Recommendations: rehabilitation facility  Discharge Equipment Recommendations:  walker, rolling  Barriers to discharge:  None    Assessment:     Papito Bhakta is a 65 y.o. male with a medical diagnosis of Acute hypoxemic respiratory failure.  He presents with impaired ADL and mobility performance defiicts. Pt found mobilizing in restroom with [facility] staff, standing using RW for balance. OT/PT treatment team gently educated patient that due to pt's recent pacemaker placement, RW usage not deemed safe at this time as pt relying heavily on bearing down on RW. Pt assisted from commode to bed chair with mod (A)X2 with HHA. Pt required encouragement and extensive education in relation to pacemaker precautions during functional tasks (sit<stand from various surfaces, inability to bear weight (NWB RUE) on sink during ADLs, etc). Pt also educated that these precautions are temporary for healing process for RUE for ~6 wks. Pt then tolerated standing trials using HHA from chair with min-mod (A)-HHA while using LUE and lower body for strength. Pt remains a high fall risk at this time and not safe to return home. Pt receptive to POC and voiced appreciation of session and associated education to keep pt safe.   Performance deficits affecting function are weakness, impaired self care skills, impaired balance, decreased coordination, decreased safety awareness, impaired endurance, impaired functional mobilty, orthopedic precautions, impaired cardiopulmonary response to activity, gait instability, decreased upper extremity function, decreased lower extremity function. Pt would benefit from continued OT skilled services 4x/wk to improve daily living skills to optimize QOL. Pt is recommended to discharge to Rehab at this time.      Rehab Prognosis:  Good;  patient would benefit from acute skilled OT services to address these deficits and reach maximum level of function.       Plan:     Patient to be seen 4 x/week to address the above listed problems via self-care/home management, therapeutic activities, therapeutic exercises  · Plan of Care Expires: 11/29/20  · Plan of Care Reviewed with: patient    Subjective     Pain/Comfort:  · Pain Rating 1: 0/10  · Pain Rating Post-Intervention 2: 0/10    Objective:     Communicated with: RN prior to session.  Patient found standing at commode with LAUREN Waller. with peripheral IV, telemetry, blood pressure cuff, pulse ox (continuous) upon OT entry to room.    General Precautions: Standard, fall, pacemaker   Orthopedic Precautions:N/A   Braces: N/A     Occupational Performance:     Bed Mobility:    NT as pt found mobilizing     Functional Mobility/Transfers:  · Patient completed Sit <> Stand Transfer with minimum assistance and moderate assistance  with  hand-held assist (first trial mod A-HHA, second trial min A-HHA). Pt pushing up from LUE to adhere to pacemaker precautions.   · Patient completed Toilet Transfer Step Transfer technique with moderate assistance with  hand-held assist (demo with RN as well as OT/PT team, discussed level of A required).   · Functional Mobility: Pt completed bedroom mobility from toilet to bedside chair with mod (A)-HHA during gait. Pt then completed x2 sit>stand trials from bedside chair with min-mod (A) needed.     Activities of Daily Living:  · Feeding:  setup (A) while up in chair for fresh drinking water   · Toileting: moderate assistance with pericare in standing (RN present)      [facility] 6 Click ADL: 19    Treatment & Education:  Pt educated on role of occupational therapy, POC, and safety during ADLs and functional mobility. Pt and OT discussed importance of safe, continued mobility to optimize daily living skills. Pt verbalized understanding.  LAUREN Waller also aware of pt's pacemaker  precautions this date. Pt completed the following during session: toileting, bedroom and bathroom mobility, significant education provided in reference to pt's daily care ability (t/f, balance during standing ADLs, deferring utilization of RW and pacemaker precaution safety), feeding.White board updated during session. Pt given instruction to call for medical staff/nurse for assistance.       Patient left up in chair with all lines intact, call button in reach and RN Sridhar notifiedEducation:      GOALS:   Multidisciplinary Problems     Occupational Therapy Goals        Problem: Occupational Therapy Goal    Goal Priority Disciplines Outcome Interventions   Occupational Therapy Goal     OT, PT/OT Ongoing, Progressing    Description: Goals to be met by: 10/14/2020    Patient will increase functional independence with ADLs by performing:    UE Dressing with Supervision.  LE Dressing with Minimal Assistance.  Grooming while standing with Contact Guard Assistance.  Supine to sit with Minimal Assistance.  Toilet transfer to toilet with Minimal Assistance.                     Time Tracking:     OT Date of Treatment: 10/13/20  OT Start Time: 1103  OT Stop Time: 1135  OT Total Time (min): 32 min    Billable Minutes:Self Care/Home Management 32 min    Carmen Jaimes OT  10/13/2020

## 2020-10-13 NOTE — PLAN OF CARE
Problem: Physical Therapy Goal  Goal: Physical Therapy Goal  Description: Goals to be met by: 10/22/20     Patient will increase functional independence with mobility by performin. Supine to sit with Moderate Assistance.- Met 10/12  Supine to sit with contact guard assist   2. Sit to supine with Moderate Assistance.  3. Sit to stand transfer with Moderate Assistance with LRAD while maintaining Pacemaker precautions.  4. Bed to chair transfer with Moderate Assistance with LRAD while maintaining Pacemaker precautions.  5. Gait  x 10 feet with Moderate Assistance, with LRAD while maintaining Pacemaker precautions.   6. Lower extremity exercise program x 20 reps per handout, with assistance as needed.  7. Pt will recite 3/3 pacemaker precautions and remain compliant with no verbal cueing throughout session    Outcome: Ongoing, Progressing   Continue with plan of care.   Heather Vidal, PT  10/13/2020

## 2020-10-14 LAB
ALBUMIN SERPL BCP-MCNC: 2.3 G/DL (ref 3.5–5.2)
ALP SERPL-CCNC: 98 U/L (ref 55–135)
ALT SERPL W/O P-5'-P-CCNC: 23 U/L (ref 10–44)
ANION GAP SERPL CALC-SCNC: 8 MMOL/L (ref 8–16)
AST SERPL-CCNC: 33 U/L (ref 10–40)
BASOPHILS # BLD AUTO: 0.05 K/UL (ref 0–0.2)
BASOPHILS NFR BLD: 0.7 % (ref 0–1.9)
BILIRUB SERPL-MCNC: 0.6 MG/DL (ref 0.1–1)
BUN SERPL-MCNC: 21 MG/DL (ref 8–23)
CALCIUM SERPL-MCNC: 9.1 MG/DL (ref 8.7–10.5)
CHLORIDE SERPL-SCNC: 101 MMOL/L (ref 95–110)
CO2 SERPL-SCNC: 25 MMOL/L (ref 23–29)
CREAT SERPL-MCNC: 1.1 MG/DL (ref 0.5–1.4)
DIFFERENTIAL METHOD: ABNORMAL
EOSINOPHIL # BLD AUTO: 0.3 K/UL (ref 0–0.5)
EOSINOPHIL NFR BLD: 3.5 % (ref 0–8)
ERYTHROCYTE [DISTWIDTH] IN BLOOD BY AUTOMATED COUNT: 17.2 % (ref 11.5–14.5)
EST. GFR  (AFRICAN AMERICAN): >60 ML/MIN/1.73 M^2
EST. GFR  (NON AFRICAN AMERICAN): >60 ML/MIN/1.73 M^2
GLUCOSE SERPL-MCNC: 91 MG/DL (ref 70–110)
HCT VFR BLD AUTO: 41.6 % (ref 40–54)
HGB BLD-MCNC: 12.1 G/DL (ref 14–18)
IMM GRANULOCYTES # BLD AUTO: 0.09 K/UL (ref 0–0.04)
IMM GRANULOCYTES NFR BLD AUTO: 1.2 % (ref 0–0.5)
LACTATE SERPL-SCNC: 1.2 MMOL/L (ref 0.5–2.2)
LYMPHOCYTES # BLD AUTO: 2.3 K/UL (ref 1–4.8)
LYMPHOCYTES NFR BLD: 31 % (ref 18–48)
MAGNESIUM SERPL-MCNC: 1.9 MG/DL (ref 1.6–2.6)
MCH RBC QN AUTO: 22.2 PG (ref 27–31)
MCHC RBC AUTO-ENTMCNC: 29.1 G/DL (ref 32–36)
MCV RBC AUTO: 77 FL (ref 82–98)
MONOCYTES # BLD AUTO: 1 K/UL (ref 0.3–1)
MONOCYTES NFR BLD: 13.9 % (ref 4–15)
NEUTROPHILS # BLD AUTO: 3.7 K/UL (ref 1.8–7.7)
NEUTROPHILS NFR BLD: 49.7 % (ref 38–73)
NRBC BLD-RTO: 0 /100 WBC
PHOSPHATE SERPL-MCNC: 4.1 MG/DL (ref 2.7–4.5)
PLATELET # BLD AUTO: 245 K/UL (ref 150–350)
PMV BLD AUTO: 11.5 FL (ref 9.2–12.9)
POTASSIUM SERPL-SCNC: 4.5 MMOL/L (ref 3.5–5.1)
PROT SERPL-MCNC: 7.1 G/DL (ref 6–8.4)
RBC # BLD AUTO: 5.44 M/UL (ref 4.6–6.2)
SODIUM SERPL-SCNC: 134 MMOL/L (ref 136–145)
WBC # BLD AUTO: 7.36 K/UL (ref 3.9–12.7)

## 2020-10-14 PROCEDURE — 36415 COLL VENOUS BLD VENIPUNCTURE: CPT | Mod: HCNC

## 2020-10-14 PROCEDURE — 20600001 HC STEP DOWN PRIVATE ROOM: Mod: HCNC

## 2020-10-14 PROCEDURE — 99233 PR SUBSEQUENT HOSPITAL CARE,LEVL III: ICD-10-PCS | Mod: HCNC,,, | Performed by: HOSPITALIST

## 2020-10-14 PROCEDURE — 25000003 PHARM REV CODE 250: Mod: HCNC | Performed by: NURSE PRACTITIONER

## 2020-10-14 PROCEDURE — 63600175 PHARM REV CODE 636 W HCPCS: Mod: HCNC | Performed by: STUDENT IN AN ORGANIZED HEALTH CARE EDUCATION/TRAINING PROGRAM

## 2020-10-14 PROCEDURE — 97535 SELF CARE MNGMENT TRAINING: CPT | Mod: HCNC

## 2020-10-14 PROCEDURE — 85025 COMPLETE CBC W/AUTO DIFF WBC: CPT | Mod: HCNC

## 2020-10-14 PROCEDURE — 80053 COMPREHEN METABOLIC PANEL: CPT | Mod: HCNC

## 2020-10-14 PROCEDURE — 25000003 PHARM REV CODE 250: Mod: HCNC | Performed by: STUDENT IN AN ORGANIZED HEALTH CARE EDUCATION/TRAINING PROGRAM

## 2020-10-14 PROCEDURE — 97116 GAIT TRAINING THERAPY: CPT | Mod: HCNC

## 2020-10-14 PROCEDURE — 97530 THERAPEUTIC ACTIVITIES: CPT | Mod: HCNC

## 2020-10-14 PROCEDURE — 27000221 HC OXYGEN, UP TO 24 HOURS: Mod: HCNC

## 2020-10-14 PROCEDURE — 84100 ASSAY OF PHOSPHORUS: CPT | Mod: HCNC

## 2020-10-14 PROCEDURE — 94761 N-INVAS EAR/PLS OXIMETRY MLT: CPT | Mod: HCNC

## 2020-10-14 PROCEDURE — 99900035 HC TECH TIME PER 15 MIN (STAT): Mod: HCNC

## 2020-10-14 PROCEDURE — 83735 ASSAY OF MAGNESIUM: CPT | Mod: HCNC

## 2020-10-14 PROCEDURE — 99233 SBSQ HOSP IP/OBS HIGH 50: CPT | Mod: HCNC,,, | Performed by: HOSPITALIST

## 2020-10-14 PROCEDURE — 83605 ASSAY OF LACTIC ACID: CPT | Mod: HCNC

## 2020-10-14 RX ORDER — METOPROLOL SUCCINATE 50 MG/1
50 TABLET, EXTENDED RELEASE ORAL DAILY
Qty: 30 TABLET | Refills: 11 | Status: SHIPPED | OUTPATIENT
Start: 2020-10-14 | End: 2020-10-14 | Stop reason: HOSPADM

## 2020-10-14 RX ORDER — METOPROLOL SUCCINATE 25 MG/1
25 TABLET, EXTENDED RELEASE ORAL DAILY
Qty: 30 TABLET | Refills: 11 | Status: SHIPPED | OUTPATIENT
Start: 2020-10-15 | End: 2020-12-31

## 2020-10-14 RX ORDER — SPIRONOLACTONE 25 MG/1
25 TABLET ORAL DAILY
Qty: 90 TABLET | Refills: 3
Start: 2020-10-14 | End: 2020-10-14 | Stop reason: SDUPTHER

## 2020-10-14 RX ORDER — SPIRONOLACTONE 25 MG/1
12.5 TABLET ORAL DAILY
Qty: 90 TABLET | Refills: 3
Start: 2020-10-14 | End: 2021-04-12 | Stop reason: SDUPTHER

## 2020-10-14 RX ORDER — MAGNESIUM SULFATE HEPTAHYDRATE 40 MG/ML
2 INJECTION, SOLUTION INTRAVENOUS ONCE
Status: COMPLETED | OUTPATIENT
Start: 2020-10-14 | End: 2020-10-14

## 2020-10-14 RX ORDER — METOPROLOL SUCCINATE 25 MG/1
25 TABLET, EXTENDED RELEASE ORAL DAILY
Status: DISCONTINUED | OUTPATIENT
Start: 2020-10-15 | End: 2020-10-15 | Stop reason: HOSPADM

## 2020-10-14 RX ADMIN — DOCUSATE SODIUM 50MG AND SENNOSIDES 8.6MG 1 TABLET: 8.6; 5 TABLET, FILM COATED ORAL at 08:10

## 2020-10-14 RX ADMIN — GABAPENTIN 100 MG: 100 CAPSULE ORAL at 09:10

## 2020-10-14 RX ADMIN — ENOXAPARIN SODIUM 40 MG: 40 INJECTION SUBCUTANEOUS at 05:10

## 2020-10-14 RX ADMIN — LEVOFLOXACIN 750 MG: 750 TABLET, FILM COATED ORAL at 08:10

## 2020-10-14 RX ADMIN — SPIRONOLACTONE 25 MG: 25 TABLET ORAL at 08:10

## 2020-10-14 RX ADMIN — SODIUM CHLORIDE 250 ML: 0.9 INJECTION, SOLUTION INTRAVENOUS at 07:10

## 2020-10-14 RX ADMIN — GABAPENTIN 100 MG: 100 CAPSULE ORAL at 08:10

## 2020-10-14 RX ADMIN — PRAVASTATIN SODIUM 80 MG: 40 TABLET ORAL at 08:10

## 2020-10-14 RX ADMIN — FUROSEMIDE 80 MG: 80 TABLET ORAL at 08:10

## 2020-10-14 RX ADMIN — DOCUSATE SODIUM 50MG AND SENNOSIDES 8.6MG 1 TABLET: 8.6; 5 TABLET, FILM COATED ORAL at 09:10

## 2020-10-14 RX ADMIN — FUROSEMIDE 80 MG: 80 TABLET ORAL at 05:10

## 2020-10-14 RX ADMIN — GABAPENTIN 100 MG: 100 CAPSULE ORAL at 02:10

## 2020-10-14 RX ADMIN — MAGNESIUM SULFATE 2 G: 2 INJECTION INTRAVENOUS at 08:10

## 2020-10-14 RX ADMIN — POLYETHYLENE GLYCOL 3350 17 G: 17 POWDER, FOR SOLUTION ORAL at 08:10

## 2020-10-14 NOTE — PT/OT/SLP PROGRESS
"Occupational Therapy   Co-treatment with PT    Name: Papito Bhakta  MRN: [id number]  Admitting Diagnosis:  Acute hypoxemic respiratory failure   16 Days Post-Op  Procedure(s):  INSERTION, ICD, BIVENTRICULAR     Recommendations:     Discharge Recommendations: rehabilitation facility  Discharge Equipment Recommendations:  (TBD pending progress)  Barriers to discharge: (Increased assistance at current functional level)    Assessment:     Papito Bhakta is a 65 y.o. male with a medical diagnosis of Acute hypoxemic respiratory failure.  He presents with the following performance deficits affecting function: weakness, impaired functional mobilty, decreased safety awareness, pain, impaired endurance, gait instability, impaired balance, impaired self care skills, decreased lower extremity function, impaired cardiopulmonary response to activity, decreased upper extremity function. Pt tolerated therapy session fairly well this date. Pt continues to have poor carryover with pacemaker precautions and has difficulty maintaining precautions during functional activities despite max verbal cues provided. Overall, pt is demonstrating progress towards functional independence. At this time, OT is recommending pt d/c to rehab facility due to pt's increased assistance required to complete ADLs and functional mobility safely. Pt should continue receiving skilled OT services to promote independence and safety during completion of functional mobility and ADL activities.      Rehab Prognosis:  Good; patient would benefit from acute skilled OT services to address these deficits and reach maximum level of function.       Plan:     Patient to be seen 4 x/week to address the above listed problems via self-care/home management, therapeutic activities, therapeutic exercises  · Plan of Care Expires: 11/29/20  · Plan of Care Reviewed with: patient    Subjective   "Im sorry for being a butt head"    Pain/Comfort:  · Pain Rating 1: (Pain in L hip and " back. Did not rate)  · Pain Addressed 1: Distraction, Reposition    Objective:     Communicated with: RN prior to session.  Patient found supine with pulse ox (continuous), oxygen, peripheral IV, telemetry upon OT entry to room. Pt agreeable to therapy session. PT present for co-treatment to maximize pt's functional performance in therapy session.     General Precautions: Standard, fall, pacemaker   Orthopedic Precautions:N/A   Braces: N/A     Occupational Performance:     Bed Mobility:    · Patient completed Scooting/Bridging anteriorly towards EOB with minimum assistance  · Verbal cues provided for pt to avoid use of RUE to maintain pacemaker precautions   · Patient completed Supine to Sit with HOB elevated minimum assistance  · Verbal cues provided for pt to avoid use of RUE to maintain pacemaker precautions   · Pt not following verbal cues. RUE held to keep pt from breaking precautions    Functional Mobility/Transfers:  · Patient completed Sit <> Stand Transfer from EOB with moderate assistance with quad cane on L and HHA on R   · Verbal cues provided for upright posture and forward gaze  · Verbal cues provided for hand placement on AD  · Functional Mobility: Pt engaged in functional mobility to simulate household/community distances from hospital room<>bathroom with mod A and utilizing quad cane on L and HHA on R in order to maximize functional activity tolerance and standing balance required for engagement in occupations of choice.   · Pt not following verbal cues provided for step sequence   · Verbal cues provided for upright posture and forward gaze     Activities of Daily Living:  · Grooming: CGA for dynamic standing balance while completing oral care and facial hygiene standing at bathroom sink   · Verbal cues provided for pt to use L hand to support self when holding onto sink instead of R hand  · Upper Body Dressing: minimum assistance for donning hospital gown like robe while sitting EOB  · Assistance  provided for threading BUE      [facility] 6 Click ADL: 18    Treatment & Education:  -Pt educated on role of OT, POC, and goals for therapy  -Pt educated on pacemaker precautions and strategies to maintain precautions during functional activities  -Pt educated on importance of OOB activities to improve activity tolerance to maintain functional strength  -Pt is appropriate to transfer with [facility] staff and PCT requiring mod A with quad cane on L and HHA on R    -Pt verbalized understanding and expressed no further questions or concerns  -Whiteboard updated     Patient left up in chair with all lines intact, call button in reach and chair alarm onEducation:      GOALS:   Multidisciplinary Problems     Occupational Therapy Goals        Problem: Occupational Therapy Goal    Goal Priority Disciplines Outcome Interventions   Occupational Therapy Goal     OT, PT/OT Ongoing, Progressing    Description: Goals to be met by: 10/14/2020    Patient will increase functional independence with ADLs by performing:    UE Dressing with Supervision.  LE Dressing with Minimal Assistance.  Grooming while standing with Contact Guard Assistance.  Supine to sit with Minimal Assistance.  Toilet transfer to toilet with Minimal Assistance.                     Time Tracking:     OT Date of Treatment: 10/14/20  OT Start Time: 1430  OT Stop Time: 1455  OT Total Time (min): 25 min (Co-treat with PT)    Billable Minutes:Self Care/Home Management 15  Therapeutic Activity 9    BARB Henning  10/14/2020

## 2020-10-14 NOTE — PLAN OF CARE
Problem: Occupational Therapy Goal  Goal: Occupational Therapy Goal  Description: Goals to be met by: 10/22/2020    Patient will increase functional independence with ADLs by performing:    UE Dressing with Supervision.  LE Dressing with Minimal Assistance.  Grooming while standing with Contact Guard Assistance.  Supine to sit with Minimal Assistance. MET 10/14/2020  Revised: Supine to sit with supervision  Toilet transfer to toilet with Minimal Assistance.    Outcome: Ongoing, Progressing   BARB Henning

## 2020-10-14 NOTE — ASSESSMENT & PLAN NOTE
Non-O2 dependent with need for BiPAP following bi-V ICD placement. Subsequently weaned to RA with diuresis. 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 CXR with pulmonary edema, pleural effusions, and cardiomegaly. Received 2 doses of 60 mg IV lasix s/p procedure.  DDX: Acute heart failure exacerbation  - lasix gtt 10/2, dc 10/3  - improving - euvolemic on exam  - net negative 16 L since admit  - HTS consultation per EP recs, have signed off  - was put back on oxygen during MICU course, weaning off on floor  - BNP down to 200s (500s on admit)  - Patient asymptomatic but hypotensive last night and this AM.     Plan:  - 80 mg lasix PO  - toprol 50 qd -- change to 25 QD  - off midodrine    - start entresto 49/51-- change to 24/26 BID  - aldactone 25 mg -- change to 12.5   - K>4, Mg >2 - replete as needed  - goal O2 > 93%    Patient accepted to [facility] and insurance approved.

## 2020-10-14 NOTE — PLAN OF CARE
Pt pending placement to rehab facility, waiting on insurance approval; pt resting comfortably      Problem: Wound  Goal: Optimal Wound Healing  Outcome: Ongoing, Progressing     Problem: Adult Inpatient Plan of Care  Goal: Plan of Care Review  Outcome: Ongoing, Progressing  Goal: Patient-Specific Goal (Individualization)  Outcome: Ongoing, Progressing  Goal: Absence of Hospital-Acquired Illness or Injury  Outcome: Ongoing, Progressing  Goal: Optimal Comfort and Wellbeing  Outcome: Ongoing, Progressing  Goal: Readiness for Transition of Care  Outcome: Ongoing, Progressing  Goal: Rounds/Family Conference  Outcome: Ongoing, Progressing     Problem: Bariatric Environmental Safety  Goal: Safety Maintained with Care  Outcome: Ongoing, Progressing     Problem: Fall Injury Risk  Goal: Absence of Fall and Fall-Related Injury  Outcome: Ongoing, Progressing     Problem: Skin Injury Risk Increased  Goal: Skin Health and Integrity  Outcome: Ongoing, Progressing     Problem: Adjustment to Illness (Heart Failure)  Goal: Optimal Coping  Outcome: Ongoing, Progressing     Problem: Cardiac Output Decreased (Heart Failure)  Goal: Optimal Cardiac Output  Outcome: Ongoing, Progressing     Problem: Fluid Imbalance (Heart Failure)  Goal: Fluid Balance  Outcome: Ongoing, Progressing     Problem: Adjustment to Illness (Sepsis/Septic Shock)  Goal: Optimal Coping  Outcome: Ongoing, Progressing     Problem: Nutrition Impaired (Sepsis/Septic Shock)  Goal: Optimal Nutrition Intake  Outcome: Ongoing, Progressing     Problem: Electrolyte Imbalance (Acute Kidney Injury/Impairment)  Goal: Serum Electrolyte Balance  Outcome: Ongoing, Progressing     Problem: Fluid Imbalance (Acute Kidney Injury/Impairment)  Goal: Optimal Fluid Balance  Outcome: Ongoing, Progressing     Problem: Hematologic Alteration (Acute Kidney Injury/Impairment)  Goal: Hemoglobin, Hematocrit and Platelets Within Normal Range  Outcome: Ongoing, Progressing     Problem: Oral Intake  Inadequate (Acute Kidney Injury/Impairment)  Goal: Optimal Nutrition Intake  Outcome: Ongoing, Progressing     Problem: Renal Function Impairment (Acute Kidney Injury/Impairment)  Goal: Effective Renal Function  Outcome: Ongoing, Progressing

## 2020-10-14 NOTE — PLAN OF CARE
Problem: Physical Therapy Goal  Goal: Physical Therapy Goal  Description: Goals to be met by: 10/22/20     Patient will increase functional independence with mobility by performin. Supine to sit with Moderate Assistance.- Met 10/12  Supine to sit with contact guard assist   2. Sit to supine with Moderate Assistance.  3. Sit to stand transfer with Moderate Assistance with LRAD while maintaining Pacemaker precautions.  4. Bed to chair transfer with Moderate Assistance with LRAD while maintaining Pacemaker precautions.  5. Gait  x 10 feet with Moderate Assistance, with LRAD while maintaining Pacemaker precautions.   6. Lower extremity exercise program x 20 reps per handout, with assistance as needed.  7. Pt will recite 3/3 pacemaker precautions and remain compliant with no verbal cueing throughout session    Outcome: Ongoing, Progressing   Continue with plan of care.   Heather Vidal, PT  10/14/2020

## 2020-10-14 NOTE — PROGRESS NOTES
[facility] Hospital Medicine  Progress Note    Patient Name: Papito Bhakta  MRN: [id number]  Patient Class: IP- Inpatient   Admission Date: 9/28/2020  Length of Stay: 14 days  Attending Physician: Fara Moyer MD  Primary Care Provider: Brynn To MD    [facility] Medicine Team: [facility] MED 3 Jennifer Ledezma MD    Subjective:     Principal Problem:Acute hypoxemic respiratory failure        HPI:  Mr. Bhakta is a 66 yo gentleman with PMH of NICM with AICD and recent R CRT-D placement 9/27, hypertension, HFpEF/HFrEF (EF 25% June 2020), hyperlipidemia, and obesity presenting for shortness of breath and hypoxia. On September 27 he underwent placement of cardiac resynchronization therapy pacemaker with Dr. Kwong. After procedure it was difficult to awaken patient. His pH was 7.26 and CO2 was elevated, and he was subsequently placed on BiPAP then weaned down to room air after diuresis. It was recommended he be admitted for volume overload in the setting of hypoxemia. He has been experiencing increased shortness of breath and lower extremity swelling for the past few months since his bi-V ICD was removed in June 2020 secondary to infection. He reports 2 pillow orthopnea, dyspnea ambulating from his room to the kitchen, and a 20# weight gain. Previously he was more active and able to do household chores and yard work. He has been compliant with his home medications and has been alternating between taking 80 mg of lasix once to twice daily though he was prescribed daily on discharge from the hospital in June. He denies chest pain, fevers, chills, weight loss, nausea, vomiting, diarrhea, melena/hematochezia, and dysuria.     ED/Post-Op Course:  On room air. CXR showed cardiomegaly, pulmonary edema, and pleural effusion. Received 60 mg IV lasix x2 with good urine output and improved respiratory status. CMP revealed increased CO2.     Overview/Hospital Course:  Admitted to hospital medicine for  management of acute hypoxemic respiratory failure likely secondary to acute on chronic combined HFpEF/HFrEF s/p Bi-V ICD placement. Diursed on the floor with 1 day of lasix gtt and IV pushes. CXR with cardiomegaly, cephalization, pleural effusions, and congestion. Repeat EF 18%, G3DD, PAP 50s. Diuresed and HTS following. Had to be transferred to MICU requiring pressor support 2/2 septic shock from acute complicated cystitis of serratia. Started on cefepime. Was weaned off pressors and restarted on GDMT. Awaiting placement for inpatient rehab.     Interval History: No acute events overnight. BP 82/52 overnight with a MAP of 60. Patient asymptomatic. He also was using 2 L nasal cannula overnight though he denies dyspnea and no evidence of hypoxia. This morning he denies dizziness, lightheadedness, dyspnea, and chest pain. UO 1.4 L overnight, net negative 145cc.     Review of Systems   Constitutional: Negative for activity change, chills, diaphoresis, fatigue, fever and unexpected weight change (weight gain ).   HENT: Negative for facial swelling and trouble swallowing.    Eyes: Negative for visual disturbance.   Respiratory: Negative for apnea, cough, choking, chest tightness, shortness of breath and wheezing.    Cardiovascular: Negative for chest pain, palpitations and leg swelling.   Gastrointestinal: Negative for abdominal distention, abdominal pain, blood in stool, constipation, diarrhea, nausea and vomiting.   Endocrine: Negative for cold intolerance, heat intolerance and polyuria.   Genitourinary: Negative for dysuria, enuresis, frequency, hematuria and urgency.   Musculoskeletal: Negative for back pain and myalgias.   Skin: Negative for color change, pallor and wound.   Neurological: Negative for dizziness, tremors and weakness.   Hematological: Negative for adenopathy.   Psychiatric/Behavioral: Negative for agitation, behavioral problems and confusion.     Objective:     Vital Signs (Most Recent):  Temp: 98.1  °F (36.7 °C) (10/14/20 0801)  Pulse: 81 (10/14/20 0941)  Resp: 16 (10/14/20 0801)  BP: (!) 92/50 (10/14/20 0941)  SpO2: (!) 93 % (10/14/20 0801) Vital Signs (24h Range):  Temp:  [97.6 °F (36.4 °C)-98.9 °F (37.2 °C)] 98.1 °F (36.7 °C)  Pulse:  [78-92] 81  Resp:  [16-18] 16  SpO2:  [93 %-97 %] 93 %  BP: ()/(50-79) 92/50     Weight: 134.2 kg (295 lb 13.7 oz)  Body mass index is 35.08 kg/m².    Intake/Output Summary (Last 24 hours) at 10/14/2020 0955  Last data filed at 10/14/2020 0900  Gross per 24 hour   Intake 1060 ml   Output 1470 ml   Net -410 ml      Physical Exam  Vitals signs and nursing note reviewed.   Constitutional:       Appearance: Normal appearance. He is obese. He is not ill-appearing, toxic-appearing or diaphoretic.   HENT:      Head: Normocephalic and atraumatic.      Mouth/Throat:      Mouth: Mucous membranes are moist.      Pharynx: No oropharyngeal exudate.   Eyes:      General: No scleral icterus.     Extraocular Movements: Extraocular movements intact.      Conjunctiva/sclera: Conjunctivae normal.      Pupils: Pupils are equal, round, and reactive to light.   Neck:      Musculoskeletal: Normal range of motion and neck supple.   Cardiovascular:      Rate and Rhythm: Normal rate and regular rhythm.      Pulses: Normal pulses.      Heart sounds: No murmur.   Pulmonary:      Breath sounds: No wheezing.      Comments: Patient currently on RA  Chest:      Chest wall: No tenderness.   Abdominal:      General: Abdomen is flat. Bowel sounds are normal. There is no distension.      Tenderness: There is no abdominal tenderness. There is no guarding or rebound.   Lymphadenopathy:      Cervical: No cervical adenopathy.   Skin:     General: Skin is warm and dry.      Capillary Refill: Capillary refill takes less than 2 seconds.      Comments: Wrinkled, firm, and dark skin of lower extremities to the knees   Neurological:      General: No focal deficit present.      Mental Status: He is alert and oriented  to person, place, and time.      Comments: Patient at baseline mental status per daughter         Significant Labs: All pertinent labs within the past 24 hours have been reviewed.    Significant Imaging: I have reviewed all pertinent imaging results/findings within the past 24 hours.      Assessment/Plan:      * Acute hypoxemic respiratory failure  Non-O2 dependent with need for BiPAP following bi-V ICD placement. Subsequently weaned to RA with diuresis. 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 CXR with pulmonary edema, pleural effusions, and cardiomegaly. Received 2 doses of 60 mg IV lasix s/p procedure.  DDX: Acute heart failure exacerbation  - lasix gtt 10/2, dc 10/3  - improving - euvolemic on exam  - net negative 16 L since admit  - HTS consultation per EP recs, have signed off  - was put back on oxygen during MICU course, weaning off on floor  - BNP down to 200s (500s on admit)  - Patient asymptomatic but hypotensive last night and this AM.     Plan:  - 80 mg lasix PO  - toprol 50 qd -- change to 25 QD  - off midodrine    - start entresto 49/51-- change to 24/26 BID  - aldactone 25 mg -- change to 12.5   - K>4, Mg >2 - replete as needed  - goal O2 > 93%    Patient accepted to [facility] and insurance approved.       Severe sepsis  2/2 acute complicated cystitis speciated to serratia. White count resolved.     - completed 4 days of cefepime  - levaquin 750 mg daily x 3 days (day 2)  - will dc on PO abx     Chronic combined systolic and diastolic congestive heart failure  History of combined systolic and diastolic dysfunction, s/p CRT-D 9/28 with continued volume overload. Has AICD. Compliant with home medications.    - Please see Acute Hypoxemic Respiratory Failure      RIGOBERTO (acute kidney injury)  RIGOBERTO during MICU course, likely 2/2 hypotension. Resolved    - given resolution of RIGOBERTO will restart lasix  - CMP daily      Essential  hypertension  Home meds: carvedilol 12.5 BID, ramipiril, aldactone  - required pressor support and then midodrine in ICU, all bp meds held  - entresto 49-51 -- 24/26 today  - weaned off midodrine  - resume aldactone - start 12.5 dose given hypotension  - restart toprol      Hyperlipidemia  Continue home pravastatin    NICM (nonischemic cardiomyopathy)  Please see Acute Hypoxemic Respiratory Failure      Hematuria  New onset hematuria, also dysuria and open cut on genital 2/2 trauma during friend removal    Plan  - repeat UA with RBC > 100 and 3+ occult blood  - pyridium for dysuria  - wound care consultation placed      Biventricular ICD (implantable cardioverter-defibrillator) in place  Placed 9/28 with Dr. Kwong.    - completed prophylactic keflex dose        VTE Risk Mitigation (From admission, onward)         Ordered     enoxaparin injection 40 mg  Every 24 hours      10/09/20 1304     IP VTE HIGH RISK PATIENT  Once      09/29/20 1417     Place sequential compression device  Until discontinued      09/29/20 1417                Discharge Planning   MARIIA: 10/15/2020     Code Status: Full Code   Is the patient medically ready for discharge?: Yes    Reason for patient still in hospital (select all that apply): Treatment   Discharge Plan A: Rehab([facility])   Discharge Delays: None known at this time          Jennifer Ledezma MD  Department of Hospital Medicine   [facility]                      10/14/2020                             STAFF PHYSICIAN NOTE                                   Attending Attestation for Rounds with Resident  I have reviewed and concur with the resident's history, physical, assessment, and plan.  I have personally interviewed and examined the patient at bedside and agree with the resident's findings.          64 yo gentleman with PMH of NICM with AICD and recent R CRT-D placement 9/27, hypertension, HFpEF/HFrEF (EF 18%) hyperlipidemia, and obesity presenting for  shortness of breath and hypoxia s/p AICD and was diuresed with lasix Iv/gtt. Course complicated by urosepsis and transferred to MICU. on GDMT and back on RA. [facility].  BP low - titrating meds.                               Fara Moyer MD  Senior Hospitalist  [id number], [phone]

## 2020-10-14 NOTE — PLAN OF CARE
[facility]    FACILITY TRANSFER ORDERS      Patient Name: Papito Bhakta  YOB: 1955    PCP: Brynn To MD   PCP Address: [address] [place] [address] / [id number]  PCP Phone Number: [phone]  PCP Fax: [phone]    Encounter Date: 10/14/2020    Admit to: Inpatient Rehab    Vital Signs:  Routine    Diagnoses:   Active Hospital Problems    Diagnosis  POA    *Acute hypoxemic respiratory failure [J96.01]  Yes    Severe sepsis [A41.9, R65.20]  Yes     Priority: 2     Chronic combined systolic and diastolic congestive heart failure [I50.42]  Yes     Priority: 2     RIGOBERTO (acute kidney injury) [N17.9]  Yes     Priority: 3     Essential hypertension [I10]  Yes     Priority: 3      Chronic    Hyperlipidemia [E78.5]  Yes     Priority: 4      Chronic    NICM (nonischemic cardiomyopathy) [I42.8]  Yes     Priority: 5      Chronic    Hematuria [R31.9]  Unknown     Priority: 6     Acute on chronic combined systolic and diastolic heart failure [I50.43]  Yes    Biventricular ICD (implantable cardioverter-defibrillator) in place [Z95.810]  Yes      Resolved Hospital Problems   No resolved problems to display.       Allergies:Review of patient's allergies indicates:  No Known Allergies    Diet: cardiac diet    Activities: Activity as tolerated    Nursing: Per Facility Protocol      Labs: CBC and CMP Once     CONSULTS:    Physical Therapy to evaluate and treat. , Occupational Therapy to evaluate and treat. and  to evaluate for community resources/long-range planning.    MISCELLANEOUS CARE: Heart Failure Instructions:   For Weight Gain > 2-3 lbs in 1 day or 4-6 lbs over 1 week:     Increase Lasix (oral diuretic) dose to three times a day for 5 days temporarily     Obtain BMP lab test in 3 days      If weight does not decrease by 5 lbs after 5 days of increased diuretic usage:   Notify Dr. Long      WOUND CARE ORDERS  Yes: Recommend nursing to clean penis with cleansing cloth, then  apply 3M barrier film spray to affected area to protect new skin from excess moisture to prevent any new breakdown.     Medications: Review discharge medications with patient and family and provide education.      Current Discharge Medication List      START taking these medications    Details   metoprolol succinate (TOPROL-XL) 25 MG 24 hr tablet Take 1 tablet (25 mg total) by mouth once daily.  Qty: 30 tablet, Refills: 11    Comments: .      sacubitriL-valsartan (ENTRESTO) 24-26 mg per tablet Take 1 tablet by mouth 2 (two) times daily. Hold if SBP < 110  Qty: 60 tablet, Refills: 0         CONTINUE these medications which have CHANGED    Details   aspirin 325 MG tablet Take 1 tablet (325 mg total) by mouth once daily.  Refills: 0      furosemide (LASIX) 80 MG tablet Take 1 tablet (80 mg total) by mouth 2 (two) times a day.  Qty: 180 tablet, Refills: 3    Associated Diagnoses: Chronic combined systolic and diastolic congestive heart failure      ibuprofen (ADVIL,MOTRIN) 200 MG tablet Take 1 tablet (200 mg total) by mouth nightly as needed for Pain.  Refills: 0      pravastatin (PRAVACHOL) 80 MG tablet Take 1 tablet (80 mg total) by mouth once daily.  Qty: 90 tablet, Refills: 3    Associated Diagnoses: Other hyperlipidemia      spironolactone (ALDACTONE) 25 MG tablet Take 0.5 tablets (12.5 mg total) by mouth once daily. Hold if SBP < 110  Qty: 90 tablet, Refills: 3    Comments: .         STOP taking these medications       gabapentin (NEURONTIN) 100 MG capsule Comments:   Reason for Stopping:         ramipril (ALTACE) 10 MG capsule Comments:   Reason for Stopping:                    _________________________________  Jennifer Ledezma MD  10/14/2020

## 2020-10-14 NOTE — SUBJECTIVE & OBJECTIVE
Interval History: No acute events overnight. BP 82/52 overnight with a MAP of 60. Patient asymptomatic. He also was using 2 L nasal cannula overnight though he denies dyspnea and no evidence of hypoxia. This morning he denies dizziness, lightheadedness, dyspnea, and chest pain. UO 1.4 L overnight, net negative 145cc.     Review of Systems   Constitutional: Negative for activity change, chills, diaphoresis, fatigue, fever and unexpected weight change (weight gain ).   HENT: Negative for facial swelling and trouble swallowing.    Eyes: Negative for visual disturbance.   Respiratory: Negative for apnea, cough, choking, chest tightness, shortness of breath and wheezing.    Cardiovascular: Negative for chest pain, palpitations and leg swelling.   Gastrointestinal: Negative for abdominal distention, abdominal pain, blood in stool, constipation, diarrhea, nausea and vomiting.   Endocrine: Negative for cold intolerance, heat intolerance and polyuria.   Genitourinary: Negative for dysuria, enuresis, frequency, hematuria and urgency.   Musculoskeletal: Negative for back pain and myalgias.   Skin: Negative for color change, pallor and wound.   Neurological: Negative for dizziness, tremors and weakness.   Hematological: Negative for adenopathy.   Psychiatric/Behavioral: Negative for agitation, behavioral problems and confusion.     Objective:     Vital Signs (Most Recent):  Temp: 98.1 °F (36.7 °C) (10/14/20 0801)  Pulse: 81 (10/14/20 0941)  Resp: 16 (10/14/20 0801)  BP: (!) 92/50 (10/14/20 0941)  SpO2: (!) 93 % (10/14/20 0801) Vital Signs (24h Range):  Temp:  [97.6 °F (36.4 °C)-98.9 °F (37.2 °C)] 98.1 °F (36.7 °C)  Pulse:  [78-92] 81  Resp:  [16-18] 16  SpO2:  [93 %-97 %] 93 %  BP: ()/(50-79) 92/50     Weight: 134.2 kg (295 lb 13.7 oz)  Body mass index is 35.08 kg/m².    Intake/Output Summary (Last 24 hours) at 10/14/2020 0955  Last data filed at 10/14/2020 0900  Gross per 24 hour   Intake 1060 ml   Output 1470 ml   Net  -410 ml      Physical Exam  Vitals signs and nursing note reviewed.   Constitutional:       Appearance: Normal appearance. He is obese. He is not ill-appearing, toxic-appearing or diaphoretic.   HENT:      Head: Normocephalic and atraumatic.      Mouth/Throat:      Mouth: Mucous membranes are moist.      Pharynx: No oropharyngeal exudate.   Eyes:      General: No scleral icterus.     Extraocular Movements: Extraocular movements intact.      Conjunctiva/sclera: Conjunctivae normal.      Pupils: Pupils are equal, round, and reactive to light.   Neck:      Musculoskeletal: Normal range of motion and neck supple.   Cardiovascular:      Rate and Rhythm: Normal rate and regular rhythm.      Pulses: Normal pulses.      Heart sounds: No murmur.   Pulmonary:      Breath sounds: No wheezing.      Comments: Patient currently on RA  Chest:      Chest wall: No tenderness.   Abdominal:      General: Abdomen is flat. Bowel sounds are normal. There is no distension.      Tenderness: There is no abdominal tenderness. There is no guarding or rebound.   Lymphadenopathy:      Cervical: No cervical adenopathy.   Skin:     General: Skin is warm and dry.      Capillary Refill: Capillary refill takes less than 2 seconds.      Comments: Wrinkled, firm, and dark skin of lower extremities to the knees   Neurological:      General: No focal deficit present.      Mental Status: He is alert and oriented to person, place, and time.      Comments: Patient at baseline mental status per daughter         Significant Labs: All pertinent labs within the past 24 hours have been reviewed.    Significant Imaging: I have reviewed all pertinent imaging results/findings within the past 24 hours.

## 2020-10-14 NOTE — PLAN OF CARE
Patient alert and oriented with no c/o on this nurses shift. Patients BP low this am and BP meds were held, MD made aware. No acute distress noted, resp even and unlabored on 2l n/c. Call light within reach, bed locked and in lowest position. Patient worked with Pt today and is up in the chair, ongoing assessment being made.

## 2020-10-14 NOTE — ASSESSMENT & PLAN NOTE
Home meds: carvedilol 12.5 BID, ramipiril, aldactone  - required pressor support and then midodrine in ICU, all bp meds held  - entresto 49-51 -- 24/26 today  - weaned off midodrine  - resume aldactone - start 12.5 dose given hypotension  - restart toprol

## 2020-10-14 NOTE — PLAN OF CARE
Message received from Izabella at [facility] informing this CM that Mr. Bhakta has been approved for [facility]. This CM will contact [facility] to see when patient can be transferred today. IM3 team notified, will continue to follow.    8:30AM  CM spoke with Serafin at [facility] who informed this CM that they are ready for patient to be transferred this morning. This CM spoke with IM3 team who stated they will be rounding on Mr. Bhakta shortly and they will notify this CM when transportation for patient can be arranged.    9:45AM  CM notified by IM3 team that they have changed some of Mr. Bhakta's medication doses and would like to see how he responds to the new regimen. They will reassess patient this afternoon to see if he is stable for transfer. [facility] notified of above conversation.      Leia Gallegos RN  Ext [phone]

## 2020-10-14 NOTE — PT/OT/SLP PROGRESS
Physical Therapy Treatment    Co-tx with OT, level of skilled assist required for patient to mobilize while maintaining precautions, PT facilitating standing balance and gait to bathroom while OT simultaneously performing ADLs    Patient Name:  Papito Bhakta   MRN:  [id number]    Recommendations:     Discharge Recommendations:  rehabilitation facility   Discharge Equipment Recommendations: walker, rolling   Barriers to discharge: Inaccessible home, Decreased caregiver support and level of assist required for mobility, fall risk, unable to maintain pacemaker precautions    Assessment:     Papito Bhakta is a 65 y.o. male admitted with a medical diagnosis of Acute hypoxemic respiratory failure.  He presents with the following impairments/functional limitations:  weakness, impaired functional mobilty, gait instability, impaired endurance, impaired balance, impaired self care skills, decreased upper extremity function, decreased safety awareness, pain. The patient recalled 1/3 pacemaker precautions for R UE, required constant cues to avoid WBing through R UE with mobility tasks. Performed bed mobility with minimum assistance, cued to avoid pushing with R UE. Patient recalled good set up for sit to stand transfer with DOROTHY HHA and moderate assistance- cues and manual assist to avoid WBing through R UE. Ambulated 8' x2 with quad cane on L to and from bathroom with moderate assistance on R HHA, minimum assistance on L, very kyphotic posture, antalgic gait due to L hip pain.  Based on the patient's progress with therapy, motivation to participate in treatment, and prior level of independence, they are an excellent candidate for inpatient rehabilitation and they would benefit from rehab to maximize their functional potential.      Rehab Prognosis: Good and Fair; patient would benefit from acute skilled PT services to address these deficits and reach maximum level of function.    Recent Surgery: Procedure(s) (LRB):  INSERTION,  "ICD, BIVENTRICULAR (N/A) 16 Days Post-Op    Plan:     During this hospitalization, patient to be seen 4 x/week to address the identified rehab impairments via gait training, therapeutic activities, therapeutic exercises, neuromuscular re-education and progress toward the following goals:    · Plan of Care Expires:  11/07/20    Subjective     Chief Complaint: "I was doing better before I came in here, I was cutting my grass"  Patient/Family Comments/goals: walk to bathroom, increase independence with mobility  Pain/Comfort:  · Pain Rating 1: 0/10  · Location - Side 1: Left  · Location - Orientation 1: generalized  · Location 1: hip  · Pain Addressed 1: Reposition, Distraction  · Pain Rating Post-Intervention 1: (pain in WBing, pain with gait)      Objective:     Communicated with RN prior to session.  Patient found HOB elevated with telemetry, oxygen upon PT entry to room.     General Precautions: Standard, fall, pacemaker(R UE pacemaker precautions)   Orthopedic Precautions:N/A   Braces: N/A     Functional Mobility:    Bed Mobility  Supine to Sit on the L side:  minimum assistance, cues to use L UE for support, avoid pulling with R UE to sit   Transfers Sit to Stand:  moderate assistance, patient recalled set up for sit to stand, recalled anterior rocking technique, PT holding R UE to prevent WBing through R UE to push to stand   Gait  Gait Distance: 8 x2 ft with Quad cane on L  Assistance Level: moderate assistance PT on R supporting R UE, OT on L for balance  Description: short shuffling strides, antalgic gait due to L hip pain, very kyphotic posture, 3 point gait leading with L foot    Gait training: cued to look up, cued for hip extension, cued for 3 point gait leading with cane/R then L LE, patient not attempting to modify gait to follow therapist cues, cued to avoid WBing to R UE for support- patient with minimal attempt to modify gait            AM-PAC 6 CLICK MOBILITY  Turning over in bed (including " adjusting bedclothes, sheets and blankets)?: 3  Sitting down on and standing up from a chair with arms (e.g., wheelchair, bedside commode, etc.): 3  Moving from lying on back to sitting on the side of the bed?: 3  Moving to and from a bed to a chair (including a wheelchair)?: 2  Need to walk in hospital room?: 2  Climbing 3-5 steps with a railing?: 1  Basic Mobility Total Score: 14       Therapeutic Activities and Exercises:   Patient safe to t/f with RN assist of 2 people, re-iterated to RN that patient is essentially NWB to R UE for t/f- whiteboard updated with precautions.  Standing balance, contact guard assist to stand by assistance, for 8 minutes  -Demonstrates trunk/hip/knee flexion in standing with R lateral lean, very kyphotic posture  -Manual/verbal cues and facilitation for hip extension, trunk extension, cues to look up  -Manual/verbal cues for quad and glute engagement  -Patient able to maintain balance with no UE support in static standing for ~30 sec intervals while performing ADLs at sink with OT, cued for upright posture; with anterior reaching with L hand patient weight bearing significantly through R UE- PT reminding patient of inability to put more than 5lbs through R UE, patient became aggravated and resistant to cuing and insistent on using R UE   Re-iterated consequences of breaking precautions- possible need for surgery revision, he later apologized for resisting  therapist intervention     Patient educated on role of therapy, goals of session, benefits of out of bed mobility. Patient agreeable to mobilize with therapy.      Patient left up in chair with all lines intact, call button in reach, chair alarm on, RN notified and patient requesting ice water- ok'd by RN for half pitcher of ice water and left at bedside..    GOALS:   Multidisciplinary Problems     Physical Therapy Goals        Problem: Physical Therapy Goal    Goal Priority Disciplines Outcome Goal Variances Interventions    Physical Therapy Goal     PT, PT/OT Ongoing, Progressing     Description: Goals to be met by: 10/22/20     Patient will increase functional independence with mobility by performin. Supine to sit with Moderate Assistance.- Met 10/12  Supine to sit with contact guard assist   2. Sit to supine with Moderate Assistance.  3. Sit to stand transfer with Moderate Assistance with LRAD while maintaining Pacemaker precautions.  4. Bed to chair transfer with Moderate Assistance with LRAD while maintaining Pacemaker precautions.  5. Gait  x 10 feet with Moderate Assistance, with LRAD while maintaining Pacemaker precautions.   6. Lower extremity exercise program x 20 reps per handout, with assistance as needed.  7. Pt will recite 3/3 pacemaker precautions and remain compliant with no verbal cueing throughout session                     Time Tracking:     PT Received On: 10/14/20  PT Start Time: 1430     PT Stop Time: 1456  PT Total Time (min): 26 min     Billable Minutes: Gait Training 16 and Therapeutic Activity 8 (co-tx with OT)    Treatment Type: Treatment  PT/PTA: PT     PTA Visit Number: 0     Heather Vidal, PT  10/14/2020

## 2020-10-15 VITALS
HEIGHT: 77 IN | BODY MASS INDEX: 34.94 KG/M2 | SYSTOLIC BLOOD PRESSURE: 114 MMHG | HEART RATE: 91 BPM | WEIGHT: 295.88 LBS | RESPIRATION RATE: 19 BRPM | DIASTOLIC BLOOD PRESSURE: 53 MMHG | OXYGEN SATURATION: 96 % | TEMPERATURE: 98 F

## 2020-10-15 LAB
ALBUMIN SERPL BCP-MCNC: 2.5 G/DL (ref 3.5–5.2)
ALP SERPL-CCNC: 98 U/L (ref 55–135)
ALT SERPL W/O P-5'-P-CCNC: 22 U/L (ref 10–44)
ANION GAP SERPL CALC-SCNC: 9 MMOL/L (ref 8–16)
AST SERPL-CCNC: 28 U/L (ref 10–40)
BASOPHILS # BLD AUTO: 0.05 K/UL (ref 0–0.2)
BASOPHILS NFR BLD: 0.7 % (ref 0–1.9)
BILIRUB SERPL-MCNC: 0.6 MG/DL (ref 0.1–1)
BUN SERPL-MCNC: 19 MG/DL (ref 8–23)
CALCIUM SERPL-MCNC: 9.3 MG/DL (ref 8.7–10.5)
CHLORIDE SERPL-SCNC: 100 MMOL/L (ref 95–110)
CO2 SERPL-SCNC: 26 MMOL/L (ref 23–29)
CREAT SERPL-MCNC: 1 MG/DL (ref 0.5–1.4)
DIFFERENTIAL METHOD: ABNORMAL
EOSINOPHIL # BLD AUTO: 0.2 K/UL (ref 0–0.5)
EOSINOPHIL NFR BLD: 2.8 % (ref 0–8)
ERYTHROCYTE [DISTWIDTH] IN BLOOD BY AUTOMATED COUNT: 17.2 % (ref 11.5–14.5)
EST. GFR  (AFRICAN AMERICAN): >60 ML/MIN/1.73 M^2
EST. GFR  (NON AFRICAN AMERICAN): >60 ML/MIN/1.73 M^2
GLUCOSE SERPL-MCNC: 87 MG/DL (ref 70–110)
HCT VFR BLD AUTO: 41.8 % (ref 40–54)
HGB BLD-MCNC: 12.5 G/DL (ref 14–18)
IMM GRANULOCYTES # BLD AUTO: 0.06 K/UL (ref 0–0.04)
IMM GRANULOCYTES NFR BLD AUTO: 0.9 % (ref 0–0.5)
LYMPHOCYTES # BLD AUTO: 2.1 K/UL (ref 1–4.8)
LYMPHOCYTES NFR BLD: 29.4 % (ref 18–48)
MAGNESIUM SERPL-MCNC: 1.8 MG/DL (ref 1.6–2.6)
MCH RBC QN AUTO: 22.8 PG (ref 27–31)
MCHC RBC AUTO-ENTMCNC: 29.9 G/DL (ref 32–36)
MCV RBC AUTO: 76 FL (ref 82–98)
MONOCYTES # BLD AUTO: 0.9 K/UL (ref 0.3–1)
MONOCYTES NFR BLD: 12.5 % (ref 4–15)
NEUTROPHILS # BLD AUTO: 3.8 K/UL (ref 1.8–7.7)
NEUTROPHILS NFR BLD: 53.7 % (ref 38–73)
NRBC BLD-RTO: 0 /100 WBC
PHOSPHATE SERPL-MCNC: 3.5 MG/DL (ref 2.7–4.5)
PLATELET # BLD AUTO: 251 K/UL (ref 150–350)
PMV BLD AUTO: 11.8 FL (ref 9.2–12.9)
POTASSIUM SERPL-SCNC: 4.5 MMOL/L (ref 3.5–5.1)
PROT SERPL-MCNC: 7.4 G/DL (ref 6–8.4)
RBC # BLD AUTO: 5.49 M/UL (ref 4.6–6.2)
SODIUM SERPL-SCNC: 135 MMOL/L (ref 136–145)
WBC # BLD AUTO: 7.04 K/UL (ref 3.9–12.7)

## 2020-10-15 PROCEDURE — 83735 ASSAY OF MAGNESIUM: CPT | Mod: HCNC

## 2020-10-15 PROCEDURE — 84100 ASSAY OF PHOSPHORUS: CPT | Mod: HCNC

## 2020-10-15 PROCEDURE — 25000003 PHARM REV CODE 250: Mod: HCNC | Performed by: STUDENT IN AN ORGANIZED HEALTH CARE EDUCATION/TRAINING PROGRAM

## 2020-10-15 PROCEDURE — 85025 COMPLETE CBC W/AUTO DIFF WBC: CPT | Mod: HCNC

## 2020-10-15 PROCEDURE — 36415 COLL VENOUS BLD VENIPUNCTURE: CPT | Mod: HCNC

## 2020-10-15 PROCEDURE — 97112 NEUROMUSCULAR REEDUCATION: CPT | Mod: HCNC

## 2020-10-15 PROCEDURE — 99239 PR HOSPITAL DISCHARGE DAY,>30 MIN: ICD-10-PCS | Mod: HCNC,,, | Performed by: HOSPITALIST

## 2020-10-15 PROCEDURE — 99239 HOSP IP/OBS DSCHRG MGMT >30: CPT | Mod: HCNC,,, | Performed by: HOSPITALIST

## 2020-10-15 PROCEDURE — 25000003 PHARM REV CODE 250: Mod: HCNC | Performed by: NURSE PRACTITIONER

## 2020-10-15 PROCEDURE — 97535 SELF CARE MNGMENT TRAINING: CPT | Mod: HCNC

## 2020-10-15 PROCEDURE — 27000221 HC OXYGEN, UP TO 24 HOURS: Mod: HCNC

## 2020-10-15 PROCEDURE — 99900035 HC TECH TIME PER 15 MIN (STAT): Mod: HCNC

## 2020-10-15 PROCEDURE — 97530 THERAPEUTIC ACTIVITIES: CPT | Mod: HCNC

## 2020-10-15 PROCEDURE — 63600175 PHARM REV CODE 636 W HCPCS: Mod: HCNC | Performed by: STUDENT IN AN ORGANIZED HEALTH CARE EDUCATION/TRAINING PROGRAM

## 2020-10-15 PROCEDURE — 97803 MED NUTRITION INDIV SUBSEQ: CPT | Mod: HCNC

## 2020-10-15 PROCEDURE — 97116 GAIT TRAINING THERAPY: CPT | Mod: HCNC

## 2020-10-15 PROCEDURE — 80053 COMPREHEN METABOLIC PANEL: CPT | Mod: HCNC

## 2020-10-15 RX ORDER — FUROSEMIDE 80 MG/1
80 TABLET ORAL DAILY
Status: DISCONTINUED | OUTPATIENT
Start: 2020-10-15 | End: 2020-10-15 | Stop reason: HOSPADM

## 2020-10-15 RX ORDER — MAGNESIUM SULFATE HEPTAHYDRATE 40 MG/ML
2 INJECTION, SOLUTION INTRAVENOUS ONCE
Status: COMPLETED | OUTPATIENT
Start: 2020-10-15 | End: 2020-10-15

## 2020-10-15 RX ORDER — FUROSEMIDE 80 MG/1
80 TABLET ORAL 2 TIMES DAILY
Qty: 180 TABLET | Refills: 3
Start: 2020-10-15 | End: 2020-10-15

## 2020-10-15 RX ORDER — FUROSEMIDE 80 MG/1
80 TABLET ORAL 2 TIMES DAILY
Qty: 180 TABLET | Refills: 3 | Status: ON HOLD
Start: 2020-10-15 | End: 2020-11-21 | Stop reason: SDUPTHER

## 2020-10-15 RX ADMIN — MAGNESIUM SULFATE 2 G: 2 INJECTION INTRAVENOUS at 08:10

## 2020-10-15 RX ADMIN — FUROSEMIDE 80 MG: 80 TABLET ORAL at 08:10

## 2020-10-15 RX ADMIN — DOCUSATE SODIUM 50MG AND SENNOSIDES 8.6MG 1 TABLET: 8.6; 5 TABLET, FILM COATED ORAL at 08:10

## 2020-10-15 RX ADMIN — METOPROLOL SUCCINATE 25 MG: 25 TABLET, EXTENDED RELEASE ORAL at 09:10

## 2020-10-15 RX ADMIN — PRAVASTATIN SODIUM 80 MG: 40 TABLET ORAL at 08:10

## 2020-10-15 RX ADMIN — POLYETHYLENE GLYCOL 3350 17 G: 17 POWDER, FOR SOLUTION ORAL at 08:10

## 2020-10-15 RX ADMIN — GABAPENTIN 100 MG: 100 CAPSULE ORAL at 08:10

## 2020-10-15 NOTE — PLAN OF CARE
Problem: Occupational Therapy Goal  Goal: Occupational Therapy Goal  Description: Goals to be met by: 10/24/2020    Patient will increase functional independence with ADLs by performing:    UE Dressing with Supervision.  LE Dressing with Minimal Assistance.  Grooming while standing with Contact Guard Assistance.  Supine to sit with Minimal Assistance.  Toilet transfer to toilet with Minimal Assistance.    Extended date of goals. Goals remain appropriate. Continue OT as tolerated.  Carmen Jaimes OT  10/15/2020    Outcome: Ongoing, Progressing

## 2020-10-15 NOTE — PLAN OF CARE
[facility]    FACILITY TRANSFER ORDERS      Patient Name: Papito Bhakta  YOB: 1955    PCP: Brynn To MD   PCP Address: [address] [place] [address] / [id number]  PCP Phone Number: [phone]  PCP Fax: [phone]    Encounter Date: 10/15/2020    Admit to: Inpatient Rehab    Vital Signs:  Routine    Diagnoses:   Active Hospital Problems    Diagnosis  POA    *Acute hypoxemic respiratory failure [J96.01]  Yes    Severe sepsis [A41.9, R65.20]  Yes     Priority: 2     Chronic combined systolic and diastolic congestive heart failure [I50.42]  Yes     Priority: 2     RIGOBERTO (acute kidney injury) [N17.9]  Yes     Priority: 3     Essential hypertension [I10]  Yes     Priority: 3      Chronic    Hyperlipidemia [E78.5]  Yes     Priority: 4      Chronic    NICM (nonischemic cardiomyopathy) [I42.8]  Yes     Priority: 5      Chronic    Hematuria [R31.9]  Unknown     Priority: 6     Acute on chronic combined systolic and diastolic heart failure [I50.43]  Yes    Biventricular ICD (implantable cardioverter-defibrillator) in place [Z95.810]  Yes      Resolved Hospital Problems   No resolved problems to display.       Allergies:Review of patient's allergies indicates:  No Known Allergies    Diet: cardiac diet    Activities: Activity as tolerated    Nursing: Per Facility Protocol      Labs: CBC and CMP Once     CONSULTS:    Physical Therapy to evaluate and treat. , Occupational Therapy to evaluate and treat. and  to evaluate for community resources/long-range planning.    MISCELLANEOUS CARE: Heart Failure Instructions:   For Weight Gain > 2-3 lbs in 1 day or 4-6 lbs over 1 week:     Increase Lasix (oral diuretic) dose to two times a day until weight returns to normal     Obtain BMP lab test in 3 days      If weight does not decrease by 5 lbs after 5 days of increased diuretic usage:   Notify Dr. Long      WOUND CARE ORDERS  Yes: Recommend nursing to clean penis with cleansing cloth,  then apply 3M barrier film spray to affected area to protect new skin from excess moisture to prevent any new breakdown.     Medications: Review discharge medications with patient and family and provide education.        Current Discharge Medication List      START taking these medications    Details   metoprolol succinate (TOPROL-XL) 25 MG 24 hr tablet Take 1 tablet (25 mg total) by mouth once daily.  Qty: 30 tablet, Refills: 11    Comments: .      sacubitriL-valsartan (ENTRESTO) 24-26 mg per tablet Take 1 tablet by mouth 2 (two) times daily. Hold if SBP < 110  Qty: 60 tablet, Refills: 0         CONTINUE these medications which have CHANGED    Details   aspirin 325 MG tablet Take 1 tablet (325 mg total) by mouth once daily.  Refills: 0      furosemide (LASIX) 80 MG tablet Take 1 tablet (80 mg total) by mouth 2 (two) times a day. If weight increases by 5 pounds in 3 days or 3 lbs in 1 day, take 2 pills daily until weight decreases to baseline. If weight is stable, then take only 1 tablet daily.  Qty: 180 tablet, Refills: 3    Associated Diagnoses: Chronic combined systolic and diastolic congestive heart failure      ibuprofen (ADVIL,MOTRIN) 200 MG tablet Take 1 tablet (200 mg total) by mouth nightly as needed for Pain.  Refills: 0      pravastatin (PRAVACHOL) 80 MG tablet Take 1 tablet (80 mg total) by mouth once daily.  Qty: 90 tablet, Refills: 3    Associated Diagnoses: Other hyperlipidemia      spironolactone (ALDACTONE) 25 MG tablet Take 0.5 tablets (12.5 mg total) by mouth once daily. Hold if SBP < 110  Qty: 90 tablet, Refills: 3    Comments: .         STOP taking these medications       gabapentin (NEURONTIN) 100 MG capsule Comments:   Reason for Stopping:         ramipril (ALTACE) 10 MG capsule Comments:   Reason for Stopping:                    _________________________________  Jennifer Ledezma MD  10/15/2020

## 2020-10-15 NOTE — PLAN OF CARE
Recommendations     1. Continue current Cardiac diet, fluid restriction per MD.   2. RD to monitor & follow-up.     Goals: Pt to meet >75% of estimated energy and protein needs over the course of 7 days.  Nutrition Goal Status: goal met  Communication of RD Recs: (POC)

## 2020-10-15 NOTE — PLAN OF CARE
Pt accepted to [facility] as per Serafin in admissions.  [facility] RN should call report to [phone] at [phone].  Transportation order placed in pt chart for next available  and all questions regarding transfer should be directed to extension [phone], option 5.  Transport packet printed and sent to nurses's station desk on 8th floor WT.  Pt's daughter Annie ([phone]) notified via VM.  She & pt and in agreement with DC plan.              Anusha James, ZEINAB  Ext [phone]

## 2020-10-15 NOTE — PLAN OF CARE
Future Appointments   Date Time Provider Department Center   12/26/2020 10:00 [facility]   2/15/2021  9:15 AM EKG, APPT [facility] EKG [facility]   2/15/2021  9:40 AM COORDINATED DEVICE CHECK [facility]   2/15/2021 10:30 AM Juliana Mckenzie NP [facility] ARRHYTH [facility]           10/15/20 1540   Final Note   Assessment Type Final Discharge Note   Anticipated Discharge Disposition Rehab   What phone number can be called within the next 1-3 days to see how you are doing after discharge? [phone]   Hospital Follow Up  Appt(s) scheduled?   (Ambulatory referral sent to Cardiology.)   Right Care Referral Info   Post Acute Recommendation IRF   Facility Name [facility]   Post-Acute Status   Post-Acute Authorization Placement   Post-Acute Placement Status Set-up Complete   Discharge Delays None known at this time

## 2020-10-15 NOTE — PLAN OF CARE
Problem: Physical Therapy Goal  Goal: Physical Therapy Goal  Description: Goals to be met by: 10/22/20     Patient will increase functional independence with mobility by performin. Supine to sit with Moderate Assistance.- Met 10/12  Supine to sit with contact guard assist   2. Sit to supine with Moderate Assistance.  3. Sit to stand transfer with Moderate Assistance with LRAD while maintaining Pacemaker precautions.  4. Bed to chair transfer with Moderate Assistance with LRAD while maintaining Pacemaker precautions.  5. Gait  x 10 feet with Moderate Assistance, with LRAD while maintaining Pacemaker precautions.   6. Lower extremity exercise program x 20 reps per handout, with assistance as needed.  7. Pt will recite 3/3 pacemaker precautions and remain compliant with no verbal cueing throughout session    Outcome: Ongoing, Progressing   Continue with plan of care.   Heather Vidal, PT  10/15/2020

## 2020-10-15 NOTE — DISCHARGE INSTRUCTIONS
"Follow up with Dr. Long and your PCP.  Continue to check your blood pressure and check your weight daily.   If you notice weight gain of 5#, Increase Lasix (oral diuretic) dose to two times a day until weight returns to normal     Obtain BMP lab test in 3 days      If weight does not decrease by 5 lbs after 5 days of increased diuretic usage, notify Dr. Long    Discharge Instructions following CRT-D     Do not lift more than 5-10 lbs with your device-sided arm for 6 weeks.  Do not raise your device side arm above your shoulder for 6 weeks.      Do not get your incision wet for 48 hours following the procedure.  You make take a sponge bath during this period.  AFTER 48 hours, you may shower but avoid direct water contact with the incision (okay to shower with AQUACEL dressing).  Try to keep your affected area as dry as possible.   You may take regular showers after 2 weeks.     Do not submerge the incision in a tub, pool, or body of water for 6 weeks.    You will be discharged in a sling.  You should wear this continuously for 48 hours.   THEN, the sling may remain off during the day, but worn at night for 2-4 weeks (depending on how active a sleeper you are).     If you were driving prior to the procedure, you may resume driving after your first follow-up appointment (1-2 weeks).    No heavy activity with the affected arm for 6 weeks.    Avoid "rough contact" at the device site for 6 weeks.    You may return to work within 3-5 days, unless told otherwise.    You may participate in sexual activity unless instructed otherwise.     Keep your pacemaker or defibrillator ID card with you at ALL TIMES.      "

## 2020-10-15 NOTE — PT/OT/SLP PROGRESS
Occupational Therapy   Treatment    Name: Papito Bhakta  MRN: [id number]  Admitting Diagnosis:  Acute hypoxemic respiratory failure  17 Days Post-Op    Recommendations:     Discharge Recommendations: rehabilitation facility  Discharge Equipment Recommendations:  walker, rolling  Barriers to discharge:  None    Assessment:     Papito Bhakta is a 65 y.o. male with a medical diagnosis of Acute hypoxemic respiratory failure.  He presents with impaired ADL and mobility performance deficits. Pt session included bed mobility, toileting, recall/education in relation to pacemaker precautions, sit<Stand t/f trials, and safe setup in bedside chair. Pt required min (A) for bed mobility and mod (A) for sit<Stand with HHA. Pt continues to show difficulty adhering to NWB to RUE during t/f's requiring verbal cues to correct. Pt stood ~6 min at EOB requiring min (A)x2  for BP readings with Dr. Moyer and her MD team present. At this time, pt remains a high fall risk and is not safe to return home. Pt would benefit from continued OT skilled services 4x/wk to improve daily living skills to optimize QOL. Pt is recommended to discharge to Rehab at this time.    Performance deficits affecting function are weakness, impaired self care skills, impaired endurance, impaired functional mobilty, gait instability, decreased upper extremity function, orthopedic precautions, impaired balance, decreased safety awareness, decreased coordination.     Rehab Prognosis:  Good; patient would benefit from acute skilled OT services to address these deficits and reach maximum level of function.       Plan:     Patient to be seen 4 x/week to address the above listed problems via self-care/home management, therapeutic activities, therapeutic exercises  · Plan of Care Expires: 11/29/20  · Plan of Care Reviewed with: patient    Subjective     Pain/Comfort:  · Pain Rating 1: 0/10  · Pain Rating Post-Intervention 1: 0/10    Objective:     Communicated with: RN  prior to session.  Patient found HOB elevated with telemetry, blood pressure cuff upon OT entry to room.    General Precautions: Standard, fall, pacemaker   Orthopedic Precautions:RUE non weight bearing   Braces: N/A     Occupational Performance:     Bed Mobility:    · Patient completed Rolling/Turning to Left with  minimum assistance  · Patient completed Scooting/Bridging with minimum assistance  · Patient completed Supine to Sit with minimum assistance     Functional Mobility/Transfers:  · Patient completed Sit <> Stand Transfer with moderate assistance and of 2 persons  with  hand-held assist   · Patient completed Bed <> Chair Transfer using Step Transfer technique with moderate assistance and of 2 persons with hand-held assist  · Functional Mobility: Pt stood with mod (A)x2 with HHA for ~6 minutes. During standing, pt progressed to min (A)X2 for balance needs while requiring verbal cues for upright posturing and for pacemaker precautions. Pt then performed pivot to chair with mod (A)x2.     Activities of Daily Living:  · Feeding:  setup (A) with fresh ice water while up in chair   · Upper Body Dressing: minimum assistance donning gown around back at edge of bed   · Toileting: setup (A) of urinal at EOB       [facility] 6 Click ADL: 18    Treatment & Education:  Pt educated on role of occupational therapy, POC, and safety during ADLs and functional mobility. Pt and OT discussed importance of safe, continued mobility to optimize daily living skills. Pt verbalized understanding.   Pt completed the following during session: bed mobility, UB dressing, toileting, sit<Stand t/f, standing balance ax, recall/education on pacemaker precautions with teach back method utilized, mobility to bedside chair. Pt able to recall 1/3 pacemaker precautions initially and 2/3 pacemaker precautions after teach back and cues. White board updated during session. Pt given instruction to call for medical staff/nurse for assistance.        Patient left up in chair with all lines intact, call button in reach, chair alarm on and RN  notifiedEducation:      GOALS:   Multidisciplinary Problems     Occupational Therapy Goals        Problem: Occupational Therapy Goal    Goal Priority Disciplines Outcome Interventions   Occupational Therapy Goal     OT, PT/OT Ongoing, Progressing    Description: Goals to be met by: 10/14/2020    Patient will increase functional independence with ADLs by performing:    UE Dressing with Supervision.  LE Dressing with Minimal Assistance.  Grooming while standing with Contact Guard Assistance.  Supine to sit with Minimal Assistance.  Toilet transfer to toilet with Minimal Assistance.                     Time Tracking:     OT Date of Treatment: 10/15/20  OT Start Time: 1102  OT Stop Time: 1131  OT Total Time (min): 29 min    Billable Minutes:Self Care/Home Management 19 MIN  Therapeutic Activity 10 MIN    Carmen Jaimes OT  10/15/2020

## 2020-10-15 NOTE — PROGRESS NOTES
"  [facility]  Adult Nutrition  Consult Note    SUMMARY     Recommendations    1. Continue current Cardiac diet, fluid restriction per MD.   2. RD to monitor & follow-up.    Goals: Pt to meet >75% of estimated energy and protein needs over the course of 7 days.  Nutrition Goal Status: goal met  Communication of RD Recs: (POC)    Reason for Assessment    Reason For Assessment: RD follow-up  Diagnosis: (Acute hypoxemic respiratory failure)  Relevant Medical History: CHF< hyperlipidemia, HTN< obesity, RIGOBERTO  Interdisciplinary Rounds: did not attend    General Information Comments: Pt w/ adequate PO intake, consuming % of meals & tolerating diet. Good appetite, stable weight PTA. Pt w/ no indicators of malnutrition, NFPE not warranted. Pt awaiting rehab placement.   Nutrition Discharge Planning: Adequate PO intake    Nutrition/Diet History    Patient Reported Diet/Restrictions/Preferences: low salt  Spiritual, Cultural Beliefs, Alevism Practices, Values that Affect Care: no  Food Allergies: NKFA  Factors Affecting Nutritional Intake: None identified at this time    Anthropometrics    Temp: 98.3 °F (36.8 °C)  Height Method: Stated  Height: 6' 5" (195.6 cm)  Height (inches): 77 in  Weight Method: Bed Scale  Weight: 134.2 kg (295 lb 13.7 oz)  Weight (lb): 295.86 lb  Ideal Body Weight (IBW), Male: 208 lb  % Ideal Body Weight, Male (lb): 142.24 %  BMI (Calculated): 35.1  BMI Grade: 35 - 39.9 - obesity - grade II    Lab/Procedures/Meds    Pertinent Labs Reviewed: reviewed  Pertinent Labs Comments: Na 135  Pertinent Medications Reviewed: reviewed  Pertinent Medications Comments: -    Estimated/Assessed Needs    Weight Used For Calorie Calculations: 134 kg (295 lb 6.7 oz)     Energy Calorie Requirements (kcal): 2242 kcal/d  Energy Need Method: Pinal-St Jeor(1.0 PAL)     Protein Requirements: 134 g/d (1 g/kg)  Weight Used For Protein Calculations: 134 kg (295 lb 6.7 oz)     Fluid Requirements (mL): " 1mL/kcal or per MD recommendations   RDA Method (mL): 2242    Nutrition Prescription Ordered    Current Diet Order: Cardiac  Nutrition Order Comments: 1000 mL FR    Evaluation of Received Nutrient/Fluid Intake    Comments: LBM: 10/13    Tolerance: tolerating    Nutrition Risk    Level of Risk/Frequency of Follow-up: (1x/week)     Assessment and Plan    No nutritional dx at this time.     Monitor and Evaluation    Food and Nutrient Intake: energy intake, food and beverage intake  Food and Nutrient Adminstration: diet order  Knowledge/Beliefs/Attitudes: food and nutrition knowledge/skill  Anthropometric Measurements: weight, weight change  Biochemical Data, Medical Tests and Procedures: electrolyte and renal panel, gastrointestinal profile  Nutrition-Focused Physical Findings: overall appearance, skin     Nutrition Follow-Up    RD Follow-up?: Yes

## 2020-10-15 NOTE — PT/OT/SLP PROGRESS
Physical Therapy Treatment    Co-tx with OT, level of skilled assist needed for patient to mobilize safely while maintaining pacemaker precautions    Patient Name:  Papito Bhakta   MRN:  [id number]    Recommendations:     Discharge Recommendations:  rehabilitation facility   Discharge Equipment Recommendations: wheelchair, bedside commode, cane, quad, shower chair(TBD pending progress)   Barriers to discharge: Inaccessible home, Decreased caregiver support and level of assist required for mobility    Assessment:     Papito Bhakta is a 65 y.o. male admitted with a medical diagnosis of Acute hypoxemic respiratory failure.  He presents with the following impairments/functional limitations:  weakness, gait instability, decreased upper extremity function, impaired balance, impaired endurance, decreased safety awareness, impaired functional mobilty, impaired self care skills, edema. The patient demonstrates improvement in recall of pacemaker precautions with mobility. Treatment today emphasized static standing balance and tolerance- patient stood ~8 min with DOROTHY HHA and minimum assistance for balance. Orthostatics negative in standing. Ambulated 3' with dorothy HHA and moderate assistance x2 for balance and support.  Based on the patient's progress with therapy, motivation to participate in treatment, and prior level of independence, they are an excellent candidate for inpatient rehabilitation and they would benefit from rehab to maximize their functional potential.      Rehab Prognosis: Good; patient would benefit from acute skilled PT services to address these deficits and reach maximum level of function.    Recent Surgery: Procedure(s) (LRB):  INSERTION, ICD, BIVENTRICULAR (N/A) 17 Days Post-Op    Plan:     During this hospitalization, patient to be seen 4 x/week to address the identified rehab impairments via gait training, therapeutic activities, therapeutic exercises, neuromuscular re-education and progress toward the  "following goals:    · Plan of Care Expires:  11/07/20    Subjective     Chief Complaint: "I guess I could get up to the chair, I don't have a reason not to"   Patient/Family Comments/goals: go to rehab  Pain/Comfort:  · Pain Rating 1: 0/10      Objective:     Communicated with RN prior to session.  Patient found HOB elevated with telemetry, pulse ox (continuous) upon PT entry to room.     General Precautions: Standard, fall, pacemaker(R UE pacemaker)   Orthopedic Precautions:N/A   Braces: N/A     Orthostatic Hypotension negative in standing, asymptomatic        Position Blood Pressure MAP   Sitting  111/71 81   Standing  114/53 72              Functional Mobility:    Bed Mobility  Supine to Sit on the L side:  minimum assistance, cues to avoid pulling up with R arm, cues to scoot hips forward with momentum   Transfers Sit to Stand:  moderate assistance x2, therapist supporting R UE to avoid excessive WBing through UE to push to stand; patient with good recall of set up for t/f, cues only for sequencing, rocked anteriorly to initiate t/f   Gait  Gait Distance: 3 ft with DOROTHY HHA  Assistance Level: moderate assistance x2  Description: kyphotic posture, antalgic gait L hip discomfort, short shuffling strides, unable to avoid WBing through R UE for support    Gait training: cued for upright posture, facilitation for weight shift, cued for reciprocal strides and to avoid WBing through R UE            AM-PAC 6 CLICK MOBILITY  Turning over in bed (including adjusting bedclothes, sheets and blankets)?: 3  Sitting down on and standing up from a chair with arms (e.g., wheelchair, bedside commode, etc.): 3  Moving from lying on back to sitting on the side of the bed?: 3  Moving to and from a bed to a chair (including a wheelchair)?: 2  Need to walk in hospital room?: 2       Therapeutic Activities and Exercises:   Patient safe to t/f with RN assist of 2 people, reyna updated.   Standing balance, minimum assistance with DOROTHY " HHA, for 8 minutes  -Demonstrates trunk/hip/knee flexion in standing with R lateral lean  -Manual/verbal cues and facilitation for hip extension, trunk extension, cues to look up  -Manual/verbal cues for quad and glute engagement  -Blocking colton feet  -Cued for midline alignment and to avoid UE support for balance     Patient educated on role of therapy, goals of session, benefits of out of bed mobility. Patient agreeable to mobilize with therapy.      Patient left HOB elevated with all lines intact, call button in reach, chair alarm on and RN notified..    GOALS:   Multidisciplinary Problems     Physical Therapy Goals        Problem: Physical Therapy Goal    Goal Priority Disciplines Outcome Goal Variances Interventions   Physical Therapy Goal     PT, PT/OT Ongoing, Progressing     Description: Goals to be met by: 10/22/20     Patient will increase functional independence with mobility by performin. Supine to sit with Moderate Assistance.- Met 10/12  Supine to sit with contact guard assist   2. Sit to supine with Moderate Assistance.  3. Sit to stand transfer with Moderate Assistance with LRAD while maintaining Pacemaker precautions.  4. Bed to chair transfer with Moderate Assistance with LRAD while maintaining Pacemaker precautions.  5. Gait  x 10 feet with Moderate Assistance, with LRAD while maintaining Pacemaker precautions.   6. Lower extremity exercise program x 20 reps per handout, with assistance as needed.  7. Pt will recite 3/3 pacemaker precautions and remain compliant with no verbal cueing throughout session                     Time Tracking:     PT Received On: 10/15/20  PT Start Time: 1109     PT Stop Time: 1133  PT Total Time (min): 24 min     Billable Minutes: Gait Training 16 and Neuromuscular Re-education 8     Treatment Type: Treatment  PT/PTA: PT     PTA Visit Number: 0     Heather Vidal, PT  10/15/2020

## 2020-10-15 NOTE — DISCHARGE SUMMARY
[facility] Hospital Medicine  Discharge Summary      Patient Name: Papito Bhakta  MRN: [id number]  Admission Date: 9/28/2020  Hospital Length of Stay: 15 days  Discharge Date and Time:  10/15/2020 12:29 PM  Attending Physician: Fara Moyer MD   Discharging Provider: Jennifer Ledezma MD  Primary Care Provider: Brynn To MD  [facility] Medicine Team: [facility] MED 3 Jennifer Ledezma MD    HPI:   Mr. Bhakta is a 66 yo gentleman with PMH of NICM with AICD and recent R CRT-D placement 9/27, hypertension, HFpEF/HFrEF (EF 25% June 2020), hyperlipidemia, and obesity presenting for shortness of breath and hypoxia. On September 27 he underwent placement of cardiac resynchronization therapy pacemaker with Dr. Kwong. After procedure it was difficult to awaken patient. His pH was 7.26 and CO2 was elevated, and he was subsequently placed on BiPAP then weaned down to room air after diuresis. It was recommended he be admitted for volume overload in the setting of hypoxemia. He has been experiencing increased shortness of breath and lower extremity swelling for the past few months since his bi-V ICD was removed in June 2020 secondary to infection. He reports 2 pillow orthopnea, dyspnea ambulating from his room to the kitchen, and a 20# weight gain. Previously he was more active and able to do household chores and yard work. He has been compliant with his home medications and has been alternating between taking 80 mg of lasix once to twice daily though he was prescribed daily on discharge from the hospital in June. He denies chest pain, fevers, chills, weight loss, nausea, vomiting, diarrhea, melena/hematochezia, and dysuria.     ED/Post-Op Course:  On room air. CXR showed cardiomegaly, pulmonary edema, and pleural effusion. Received 60 mg IV lasix x2 with good urine output and improved respiratory status. CMP revealed increased CO2.     Procedure(s) (LRB):  INSERTION, ICD, BIVENTRICULAR (N/A)   "    Hospital Course:   Admitted to hospital medicine for management of acute hypoxemic respiratory failure likely secondary to acute on chronic combined HFpEF/HFrEF s/p Bi-V ICD placement. Diursed on the floor with 1 day of lasix gtt and IV pushes. CXR with cardiomegaly, cephalization, pleural effusions, and congestion. Repeat EF 18%, G3DD, PAP 50s. Had to be transferred to MICU requiring pressor support 2/2 septic shock from acute complicated cystitis of serratia. Started on cefepime and transitioned to levaquin, course completed. Was weaned off pressors and restarted on GDMT. Discharged to inpatient rehab. Recommend follow up with Dr. Long within 10 days for GDMT titration as well as PCP follow up.      Consults:   Consults (From admission, onward)        Status Ordering Provider     Inpatient consult to Critical Care Medicine  Once     Provider:  (Not yet assigned)    Completed HEIDE DASILVA     Inpatient consult to Midline team  Once     Provider:  (Not yet assigned)    Completed EUGENE BOYD     Inpatient consult to Physical Medicine Rehab  Once     Provider:  (Not yet assigned)    Completed RONALD PAK        Vitals:    10/15/20 0900 10/15/20 0945 10/15/20 1100 10/15/20 1121   BP:  109/62     BP Location:       Patient Position:       Pulse:  88  106   Resp:       Temp:   98.1 °F (36.7 °C)    TempSrc:       SpO2:       Weight: 134.2 kg (295 lb 13.7 oz)      Height: 6' 5" (1.956 m)        Physical Exam   Constitutional: He is oriented to person, place, and time and well-developed, well-nourished, and in no distress. No distress.   HENT:   Head: Normocephalic and atraumatic.   Eyes: Pupils are equal, round, and reactive to light. Conjunctivae are normal.   Neck: Normal range of motion. Neck supple.   Cardiovascular: Normal rate and regular rhythm.   Pulmonary/Chest: Effort normal and breath sounds normal.   Abdominal: Soft. Bowel sounds are normal. He exhibits no distension. "   Musculoskeletal: Normal range of motion.         General: No edema.   Neurological: He is alert and oriented to person, place, and time.   Skin: Skin is warm and dry. He is not diaphoretic.           * Acute hypoxemic respiratory failure  Non-O2 dependent with need for BiPAP following bi-V ICD placement. Subsequently weaned to RA with diuresis. 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 CXR with pulmonary edema, pleural effusions, and cardiomegaly. Received 2 doses of 60 mg IV lasix s/p procedure.  DDX: Acute heart failure exacerbation  - lasix gtt 10/2, dc 10/3  - improving - euvolemic on exam  - net negative 16 L since admit  - HTS consultation per EP recs, have signed off  - was put back on oxygen during MICU course, weaning off on floor  - BNP down to 200s (500s on admit)  - Patient asymptomatic but hypotensive last night and this AM.     Plan:  - 80 mg lasix PO  - toprol 50 qd -- change to 25 QD  - off midodrine    - start entresto 49/51-- change to 24/26 BID  - aldactone 25 mg -- change to 12.5   - K>4, Mg >2 - replete as needed  - goal O2 > 93%    Patient accepted to [facility] and insurance approved.       Severe sepsis  2/2 acute complicated cystitis speciated to serratia. White count resolved.     - completed 4 days of cefepime  - levaquin 750 mg daily x 3 days (day 2)  - will dc on PO abx     Chronic combined systolic and diastolic congestive heart failure  History of combined systolic and diastolic dysfunction, s/p CRT-D 9/28 with continued volume overload. Has AICD. Compliant with home medications.    - Please see Acute Hypoxemic Respiratory Failure      RIGOBERTO (acute kidney injury)  RIGOBERTO during MICU course, likely 2/2 hypotension. Resolved    - given resolution of RIGOBERTO will restart lasix  - CMP daily      Essential hypertension  Home meds: carvedilol 12.5 BID, ramipiril, aldactone  - required pressor support and then midodrine in ICU,  all bp meds held  - entresto 49-51 -- 24/26 today  - weaned off midodrine  - resume aldactone - start 12.5 dose given hypotension  - restart toprol      Hyperlipidemia  Continue home pravastatin    NICM (nonischemic cardiomyopathy)  Please see Acute Hypoxemic Respiratory Failure        Final Active Diagnoses:    Diagnosis Date Noted POA    PRINCIPAL PROBLEM:  Acute hypoxemic respiratory failure [J96.01] 01/05/2019 Yes    Severe sepsis [A41.9, R65.20] 10/06/2020 Yes    Chronic combined systolic and diastolic congestive heart failure [I50.42] 12/01/2017 Yes    RIGOBERTO (acute kidney injury) [N17.9] 10/07/2020 Yes    Essential hypertension [I10] 12/01/2017 Yes     Chronic    Hyperlipidemia [E78.5] 01/05/2019 Yes     Chronic    NICM (nonischemic cardiomyopathy) [I42.8] 06/16/2020 Yes     Chronic    Hematuria [R31.9] 10/10/2020 Unknown    Acute on chronic combined systolic and diastolic heart failure [I50.43] 09/30/2020 Yes    Biventricular ICD (implantable cardioverter-defibrillator) in place [Z95.810] 05/14/2019 Yes      Problems Resolved During this Admission:       Discharged Condition: good    Disposition: Hospice/Medical Facility    Follow Up:  Follow-up Information     Jim Kwong MD In 4 months.    Specialties: Electrophysiology, Cardiology  Why: s/p CRT-D  Contact information:  [address] [facility] [id number]  [phone]                 Patient Instructions:      Ambulatory referral/consult to Cardiology   Standing Status: Future   Referral Priority: Routine Referral Type: Consultation   Referral Reason: Specialty Services Required   Requested Specialty: Cardiology   Number of Visits Requested: 1     Diet Cardiac     Other restrictions (specify):   Order Comments: Do not get your incision wet for 48 hours following the procedure.  You make take a sponge bath during this period.  AFTER 48 hours, you may shower but avoid direct water contact with the incision (okay to shower with AQUACEL  "dressing).  Try to keep your affected area as dry as possible.   You may take regular showers after 2 weeks.     Do not submerge the incision in a tub, pool, or body of water for 6 weeks.    You will be discharged in a sling.  You should wear this continuously for 48 hours.   THEN, the sling may remain off during the day, but worn at night for 2-4 weeks (depending on how active a sleeper you are).     If you were driving prior to the procedure, you may resume driving after your first follow-up appointment (1-2 weeks).    No heavy activity with the affected arm for 6 weeks.    Avoid "rough contact" at the device site for 6 weeks.    You may return to work within 3-5 days, unless told otherwise.    You may participate in sexual activity unless instructed otherwise.     Keep your pacemaker or defibrillator ID card with you at ALL TIMES.     Lifting restrictions   Order Comments: Do not lift more than 5-10 lbs with your device-sided arm for 6 weeks.  Do not raise your device side arm above your shoulder for 6 weeks.     Notify your health care provider if you experience any of the following:  temperature >100.4     Notify your health care provider if you experience any of the following:  severe uncontrolled pain     Notify your health care provider if you experience any of the following:  redness, tenderness, or signs of infection (pain, swelling, redness, odor or green/yellow discharge around incision site)     Notify your health care provider if you experience any of the following:  difficulty breathing or increased cough     Notify your health care provider if you experience any of the following:  persistent dizziness, light-headedness, or visual disturbances       Significant Diagnostic Studies: Labs:   CMP   Recent Labs   Lab 10/14/20  0500 10/15/20  0447   * 135*   K 4.5 4.5    100   CO2 25 26   GLU 91 87   BUN 21 19   CREATININE 1.1 1.0   CALCIUM 9.1 9.3   PROT 7.1 7.4   ALBUMIN 2.3* 2.5*   BILITOT " 0.6 0.6   ALKPHOS 98 98   AST 33 28   ALT 23 22   ANIONGAP 8 9   ESTGFRAFRICA >60.0 >60.0   EGFRNONAA >60.0 >60.0       Pending Diagnostic Studies:     None         Medications:  Transfer Medications (for Discharge Readmit only):   Current Facility-Administered Medications   Medication Dose Route Frequency Provider Last Rate Last Dose    acetaminophen suppository 650 mg  650 mg Rectal Q6H PRN Tigre Pollock MD   650 mg at 10/06/20 1718    acetaminophen tablet 650 mg  650 mg Oral Q4H PRN Ana Cerrato NP   650 mg at 10/04/20 0518    aluminum & magnesium hydroxide-simethicone 400-400-40 mg/5 mL suspension 30 mL  30 mL Oral Q6H PRN Jennifer Ledezma MD   30 mL at 10/12/20 1440    dextrose 50% injection 12.5 g  12.5 g Intravenous PRN Jennifer Ledezma MD        dextrose 50% injection 25 g  25 g Intravenous PRN Jennifer Ledezma MD        enoxaparin injection 40 mg  40 mg Subcutaneous Q24H Jennifer Ledezma MD   40 mg at 10/14/20 1729    furosemide tablet 80 mg  80 mg Oral Daily Jennifer Ledezma MD   80 mg at 10/15/20 0803    gabapentin capsule 100 mg  100 mg Oral TID Ana Cerrato NP   100 mg at 10/15/20 0803    glucagon (human recombinant) injection 1 mg  1 mg Intramuscular PRN Jennifer Ledezma MD        glucose chewable tablet 16 g  16 g Oral PRN Jennifer Ledezma MD        glucose chewable tablet 24 g  24 g Oral PRN Jennifer Ledezma MD        melatonin tablet 6 mg  6 mg Oral Nightly PRN Jennifer Ledezma MD   6 mg at 10/03/20 2112    metoprolol succinate (TOPROL-XL) 24 hr tablet 25 mg  25 mg Oral Daily Bogdan Dos Santos DO   25 mg at 10/15/20 0945    polyethylene glycol packet 17 g  17 g Oral Daily Jennifer Ledezma MD   17 g at 10/15/20 0803    pravastatin tablet 80 mg  80 mg Oral Daily Ana Cerrato NP   80 mg at 10/15/20 0803    sacubitriL-valsartan 24-26 mg per tablet 1 tablet  1 tablet Oral BID Jennifer Ledezma MD   Stopped at 10/14/20 0900    senna-docusate 8.6-50 mg per tablet 1 tablet  1 tablet  Oral BID Jennifer Ledezma MD   1 tablet at 10/15/20 0803    sodium chloride 0.9% flush 10 mL  10 mL Intravenous PRN Jennifer Ledezma MD        spironolactone split tablet 12.5 mg  12.5 mg Oral Daily Bogdan Dos Santos DO   Stopped at 10/15/20 0900       Indwelling Lines/Drains at time of discharge:   Lines/Drains/Airways     None                 Time spent on the discharge of patient: 45 minutes  Patient was seen and examined on the date of discharge and determined to be suitable for discharge.         Jennifer Ledezma MD  Department of Hospital Medicine  [facility]

## 2020-11-18 ENCOUNTER — TELEPHONE (OUTPATIENT)
Dept: FAMILY MEDICINE | Facility: CLINIC | Age: 65
End: 2020-11-18

## 2020-11-18 ENCOUNTER — HOSPITAL ENCOUNTER (INPATIENT)
Facility: HOSPITAL | Age: 65
LOS: 3 days | Discharge: HOME | DRG: 293 | End: 2020-11-21
Attending: EMERGENCY MEDICINE | Admitting: HOSPITALIST
Payer: MEDICARE

## 2020-11-18 ENCOUNTER — HOSPITAL ENCOUNTER (OUTPATIENT)
Dept: RADIOLOGY | Facility: HOSPITAL | Age: 65
Discharge: HOME | DRG: 293 | End: 2020-11-18
Attending: NURSE PRACTITIONER
Payer: MEDICARE

## 2020-11-18 ENCOUNTER — OFFICE VISIT (OUTPATIENT)
Dept: FAMILY MEDICINE | Facility: CLINIC | Age: 65
DRG: 293 | End: 2020-11-18
Payer: MEDICARE

## 2020-11-18 VITALS
SYSTOLIC BLOOD PRESSURE: 130 MMHG | WEIGHT: 301.5 LBS | BODY MASS INDEX: 35.6 KG/M2 | HEIGHT: 77 IN | TEMPERATURE: 98 F | OXYGEN SATURATION: 96 % | HEART RATE: 94 BPM | DIASTOLIC BLOOD PRESSURE: 76 MMHG

## 2020-11-18 DIAGNOSIS — Z95.810 BIVENTRICULAR ICD (IMPLANTABLE CARDIOVERTER-DEFIBRILLATOR) IN PLACE: ICD-10-CM

## 2020-11-18 DIAGNOSIS — I50.43 ACUTE ON CHRONIC COMBINED SYSTOLIC AND DIASTOLIC HEART FAILURE: ICD-10-CM

## 2020-11-18 DIAGNOSIS — I50.42 CHRONIC COMBINED SYSTOLIC AND DIASTOLIC CONGESTIVE HEART FAILURE: ICD-10-CM

## 2020-11-18 DIAGNOSIS — R07.9 CHEST PAIN: ICD-10-CM

## 2020-11-18 DIAGNOSIS — R06.09 DOE (DYSPNEA ON EXERTION): ICD-10-CM

## 2020-11-18 DIAGNOSIS — I50.9 CONGESTIVE HEART FAILURE, UNSPECIFIED HF CHRONICITY, UNSPECIFIED HEART FAILURE TYPE: ICD-10-CM

## 2020-11-18 DIAGNOSIS — I42.8 NICM (NONISCHEMIC CARDIOMYOPATHY): Chronic | ICD-10-CM

## 2020-11-18 DIAGNOSIS — Z09 HOSPITAL DISCHARGE FOLLOW-UP: Primary | ICD-10-CM

## 2020-11-18 DIAGNOSIS — E87.70 HYPERVOLEMIA, UNSPECIFIED HYPERVOLEMIA TYPE: ICD-10-CM

## 2020-11-18 DIAGNOSIS — R65.20 SEVERE SEPSIS: ICD-10-CM

## 2020-11-18 DIAGNOSIS — E66.01 SEVERE OBESITY (BMI 35.0-39.9) WITH COMORBIDITY: ICD-10-CM

## 2020-11-18 DIAGNOSIS — A41.9 SEVERE SEPSIS: ICD-10-CM

## 2020-11-18 DIAGNOSIS — J96.01 ACUTE HYPOXEMIC RESPIRATORY FAILURE: ICD-10-CM

## 2020-11-18 DIAGNOSIS — Z11.4 ENCOUNTER FOR SCREENING FOR HIV: ICD-10-CM

## 2020-11-18 DIAGNOSIS — I50.43 ACUTE ON CHRONIC COMBINED SYSTOLIC AND DIASTOLIC HEART FAILURE: Primary | ICD-10-CM

## 2020-11-18 DIAGNOSIS — Z13.6 SCREENING FOR AAA (ABDOMINAL AORTIC ANEURYSM): ICD-10-CM

## 2020-11-18 LAB
CTP QC/QA: YES
SARS-COV-2 RDRP RESP QL NAA+PROBE: NEGATIVE

## 2020-11-18 PROCEDURE — 3288F PR FALLS RISK ASSESSMENT DOCUMENTED: ICD-10-PCS | Mod: HCNC,CPTII,S$GLB, | Performed by: NURSE PRACTITIONER

## 2020-11-18 PROCEDURE — 99284 EMERGENCY DEPT VISIT MOD MDM: CPT | Mod: ,,, | Performed by: PHYSICIAN ASSISTANT

## 2020-11-18 PROCEDURE — 3078F PR MOST RECENT DIASTOLIC BLOOD PRESSURE < 80 MM HG: ICD-10-PCS | Mod: HCNC,CPTII,S$GLB, | Performed by: NURSE PRACTITIONER

## 2020-11-18 PROCEDURE — 3078F DIAST BP <80 MM HG: CPT | Mod: HCNC,CPTII,S$GLB, | Performed by: NURSE PRACTITIONER

## 2020-11-18 PROCEDURE — 99285 EMERGENCY DEPT VISIT HI MDM: CPT | Mod: 25,HCNC

## 2020-11-18 PROCEDURE — 3008F PR BODY MASS INDEX (BMI) DOCUMENTED: ICD-10-PCS | Mod: HCNC,CPTII,S$GLB, | Performed by: NURSE PRACTITIONER

## 2020-11-18 PROCEDURE — 1125F PR PAIN SEVERITY QUANTIFIED, PAIN PRESENT: ICD-10-PCS | Mod: HCNC,S$GLB,, | Performed by: NURSE PRACTITIONER

## 2020-11-18 PROCEDURE — 99499 RISK ADDL DX/OHS AUDIT: ICD-10-PCS | Mod: S$GLB,,, | Performed by: NURSE PRACTITIONER

## 2020-11-18 PROCEDURE — 96374 THER/PROPH/DIAG INJ IV PUSH: CPT | Mod: HCNC

## 2020-11-18 PROCEDURE — 63600175 PHARM REV CODE 636 W HCPCS: Mod: HCNC | Performed by: PHYSICIAN ASSISTANT

## 2020-11-18 PROCEDURE — G0378 HOSPITAL OBSERVATION PER HR: HCPCS | Mod: HCNC

## 2020-11-18 PROCEDURE — 99999 PR PBB SHADOW E&M-EST. PATIENT-LVL IV: CPT | Mod: PBBFAC,HCNC,, | Performed by: NURSE PRACTITIONER

## 2020-11-18 PROCEDURE — 1101F PT FALLS ASSESS-DOCD LE1/YR: CPT | Mod: HCNC,CPTII,S$GLB, | Performed by: NURSE PRACTITIONER

## 2020-11-18 PROCEDURE — 11000001 HC ACUTE MED/SURG PRIVATE ROOM: Mod: HCNC

## 2020-11-18 PROCEDURE — 99214 PR OFFICE/OUTPT VISIT, EST, LEVL IV, 30-39 MIN: ICD-10-PCS | Mod: HCNC,S$GLB,, | Performed by: NURSE PRACTITIONER

## 2020-11-18 PROCEDURE — 1125F AMNT PAIN NOTED PAIN PRSNT: CPT | Mod: HCNC,S$GLB,, | Performed by: NURSE PRACTITIONER

## 2020-11-18 PROCEDURE — 99499 UNLISTED E&M SERVICE: CPT | Mod: S$GLB,,, | Performed by: NURSE PRACTITIONER

## 2020-11-18 PROCEDURE — 3288F FALL RISK ASSESSMENT DOCD: CPT | Mod: HCNC,CPTII,S$GLB, | Performed by: NURSE PRACTITIONER

## 2020-11-18 PROCEDURE — 71046 X-RAY EXAM CHEST 2 VIEWS: CPT | Mod: TC,HCNC,FY,PO

## 2020-11-18 PROCEDURE — U0002 COVID-19 LAB TEST NON-CDC: HCPCS | Mod: HCNC | Performed by: PHYSICIAN ASSISTANT

## 2020-11-18 PROCEDURE — 3075F SYST BP GE 130 - 139MM HG: CPT | Mod: HCNC,CPTII,S$GLB, | Performed by: NURSE PRACTITIONER

## 2020-11-18 PROCEDURE — 99220 PR INITIAL OBSERVATION CARE,LEVL III: CPT | Mod: HCNC,,, | Performed by: PHYSICIAN ASSISTANT

## 2020-11-18 PROCEDURE — 71046 XR CHEST PA AND LATERAL: ICD-10-PCS | Mod: 26,HCNC,, | Performed by: RADIOLOGY

## 2020-11-18 PROCEDURE — 71046 X-RAY EXAM CHEST 2 VIEWS: CPT | Mod: 26,HCNC,, | Performed by: RADIOLOGY

## 2020-11-18 PROCEDURE — 1101F PR PT FALLS ASSESS DOC 0-1 FALLS W/OUT INJ PAST YR: ICD-10-PCS | Mod: HCNC,CPTII,S$GLB, | Performed by: NURSE PRACTITIONER

## 2020-11-18 PROCEDURE — 99214 OFFICE O/P EST MOD 30 MIN: CPT | Mod: HCNC,S$GLB,, | Performed by: NURSE PRACTITIONER

## 2020-11-18 PROCEDURE — 3008F BODY MASS INDEX DOCD: CPT | Mod: HCNC,CPTII,S$GLB, | Performed by: NURSE PRACTITIONER

## 2020-11-18 PROCEDURE — 99220 PR INITIAL OBSERVATION CARE,LEVL III: ICD-10-PCS | Mod: HCNC,,, | Performed by: PHYSICIAN ASSISTANT

## 2020-11-18 PROCEDURE — 99999 PR PBB SHADOW E&M-EST. PATIENT-LVL IV: ICD-10-PCS | Mod: PBBFAC,HCNC,, | Performed by: NURSE PRACTITIONER

## 2020-11-18 PROCEDURE — 3075F PR MOST RECENT SYSTOLIC BLOOD PRESS GE 130-139MM HG: ICD-10-PCS | Mod: HCNC,CPTII,S$GLB, | Performed by: NURSE PRACTITIONER

## 2020-11-18 PROCEDURE — 99284 PR EMERGENCY DEPT VISIT,LEVEL IV: ICD-10-PCS | Mod: ,,, | Performed by: PHYSICIAN ASSISTANT

## 2020-11-18 RX ORDER — INFLUENZA A VIRUS A/MICHIGAN/45/2015 X-275 (H1N1) ANTIGEN (FORMALDEHYDE INACTIVATED), INFLUENZA A VIRUS A/SINGAPORE/INFIMH-16-0019/2016 IVR-186 (H3N2) ANTIGEN (FORMALDEHYDE INACTIVATED), INFLUENZA B VIRUS B/PHUKET/3073/2013 ANTIGEN (FORMALDEHYDE INACTIVATED), AND INFLUENZA B VIRUS B/MARYLAND/15/2016 BX-69A ANTIGEN (FORMALDEHYDE INACTIVATED) 60; 60; 60; 60 UG/.7ML; UG/.7ML; UG/.7ML; UG/.7ML
INJECTION, SUSPENSION INTRAMUSCULAR
COMMUNITY
Start: 2020-08-25 | End: 2020-11-18

## 2020-11-18 RX ORDER — FUROSEMIDE 10 MG/ML
80 INJECTION INTRAMUSCULAR; INTRAVENOUS
Status: COMPLETED | OUTPATIENT
Start: 2020-11-18 | End: 2020-11-18

## 2020-11-18 RX ORDER — GABAPENTIN 100 MG/1
100 CAPSULE ORAL 3 TIMES DAILY
COMMUNITY
Start: 2020-10-31 | End: 2020-12-31 | Stop reason: SDUPTHER

## 2020-11-18 RX ADMIN — FUROSEMIDE 80 MG: 10 INJECTION, SOLUTION INTRAMUSCULAR; INTRAVENOUS at 08:11

## 2020-11-18 NOTE — TELEPHONE ENCOUNTER
I spoke with the patient's daughter who reports that the patient is taking 80 mg Lasix BID. She has noticed increase in lower extremity edema, KING, and ~25# weight gain since discharge from rehab facility. I have instructed her to bring patient back to the ER for emergent evaluation and IV diuresis. Report called to [facility], Carolee AGUILAR. Patient will travel by private vehicle this evening.    Thanks!  Rossi Landry, BEE-C

## 2020-11-18 NOTE — TELEPHONE ENCOUNTER
Please contact the patient- his labs and chest x-ray show that he may in fact be building up fluid again. I would like to know if he is taking the 80 mg Lasix once or twice daily. If he is taking it once daily, I would like him to increase to twice daily. If he is taking it twice daily and still showing signs of fluid overload- we may need to get him back to the ER to get the medicine in the IV to help pull the fluid off.     I would also like to help get him set up with cardiology, Dr. Long for the next available appointment.    Thanks!  Rossi Landry NP-C

## 2020-11-18 NOTE — PROGRESS NOTES
History of Present Illness   Papito Bhakta is a 65 y.o. man with medical history as listed below who presents today for hospital discharge follow-up, acute on chronic CHF. Please see HPI and hospital course below per discharge summary.     HPI:   Mr. Bhakta is a 64 yo gentleman with PMH of NICM with AICD and recent R CRT-D placement 9/27, hypertension, HFpEF/HFrEF (EF 25% June 2020), hyperlipidemia, and obesity presenting for shortness of breath and hypoxia. On September 27 he underwent placement of cardiac resynchronization therapy pacemaker with Dr. Kwong. After procedure it was difficult to awaken patient. His pH was 7.26 and CO2 was elevated, and he was subsequently placed on BiPAP then weaned down to room air after diuresis. It was recommended he be admitted for volume overload in the setting of hypoxemia. He has been experiencing increased shortness of breath and lower extremity swelling for the past few months since his bi-V ICD was removed in June 2020 secondary to infection. He reports 2 pillow orthopnea, dyspnea ambulating from his room to the kitchen, and a 20# weight gain. Previously he was more active and able to do household chores and yard work. He has been compliant with his home medications and has been alternating between taking 80 mg of lasix once to twice daily though he was prescribed daily on discharge from the hospital in June. He denies chest pain, fevers, chills, weight loss, nausea, vomiting, diarrhea, melena/hematochezia, and dysuria.      ED/Post-Op Course:  On room air. CXR showed cardiomegaly, pulmonary edema, and pleural effusion. Received 60 mg IV lasix x2 with good urine output and improved respiratory status. CMP revealed increased CO2.      Procedure(s) (LRB):  INSERTION, ICD, BIVENTRICULAR (N/A)       Hospital Course:   Admitted to hospital medicine for management of acute hypoxemic respiratory failure likely secondary to acute on chronic combined HFpEF/HFrEF s/p Bi-V ICD  placement. Diursed on the floor with 1 day of lasix gtt and IV pushes. CXR with cardiomegaly, cephalization, pleural effusions, and congestion. Repeat EF 18%, G3DD, PAP 50s. Had to be transferred to MICU requiring pressor support 2/2 septic shock from acute complicated cystitis of serratia. Started on cefepime and transitioned to levaquin, course completed. Was weaned off pressors and restarted on GDMT. Discharged to inpatient rehab. Recommend follow up with Dr. Long within 10 days for GDMT titration as well as PCP follow up.      Post Discharge:  Overall, he reports he is doing well since discharge. He reports improvement in swelling to lower extremities, still having dependent edema that improves with elevation. He reports shortness of breath with walking long distances that is relieved with rest. He is able to walk around his house without shortness of breath. He denies orthopnea, not needing extra pillows at night when lying down. He denies chest pain or palpitations. He has had follow-up with his electrophysiologist at [facility], but has not yet had follow-up with cardiology. In regards to UTI/sepsis, he has had no urinary symptoms, fever, or other complaints. He has no additional complaints and is otherwise healthy on today's visit.    Transitional Care Note    Family and/or Caretaker present at visit?  No.  Diagnostic tests reviewed/disposition: No diagnosic tests pending after this hospitalization.  Disease/illness education: None.  Home health/community services discussion/referrals: Patient does not have home health established from hospital visit.  They do not need home health.  If needed, we will set up home health for the patient.   Establishment or re-establishment of referral orders for community resources: No other necessary community resources.   Discussion with other health care providers: No discussion with other health care providers necessary.     Past Medical History:   Diagnosis Date    RIGOBERTO  (acute kidney injury) 10/7/2020    Anticoagulant long-term use     Aspirin    CHF (congestive heart failure)     Hyperlipidemia     Hypertension     NICM (nonischemic cardiomyopathy) 2020    Obesity        Past Surgical History:   Procedure Laterality Date    INSERTION OF BIVENTRICULAR IMPLANTABLE CARDIOVERTER-DEFIBRILLATOR (ICD) N/A 2019    Procedure: INSERTION, ICD, BIVENTRICULAR;  Surgeon: Shailesh Eng MD;  Location: [facility] CATH LAB;  Service: Cardiology;  Laterality: N/A;  RN PRE OP 2--19  1ST CASE PER  RADHA. NOTIFIED RADHA THAT ANESTHESIA IS NOT PITO FOR 1ST CASE START-LO    INSERTION OF BIVENTRICULAR IMPLANTABLE CARDIOVERTER-DEFIBRILLATOR (ICD) N/A 2020    Procedure: INSERTION, ICD, BIVENTRICULAR;  Surgeon: Jim Kwong MD;  Location: [facility] EP LAB;  Service: Cardiology;  Laterality: N/A;  NICM, CRT-D, SJM,, MAC, DM, 3 Prep*Wearing LifeVest*    oral extraction  2018    TESTICLE SURGERY         Social History     Socioeconomic History    Marital status:      Spouse name: Not on file    Number of children: Not on file    Years of education: Not on file    Highest education level: Not on file   Occupational History    Not on file   Social Needs    Financial resource strain: Not on file    Food insecurity     Worry: Not on file     Inability: Not on file    Transportation needs     Medical: Not on file     Non-medical: Not on file   Tobacco Use    Smoking status: Former Smoker     Packs/day: 0.50     Years: 40.00     Pack years: 20.00     Types: Cigarettes, Cigars     Quit date:      Years since quittin.8    Smokeless tobacco: Never Used   Substance and Sexual Activity    Alcohol use: Yes     Comment: once a month    Drug use: No    Sexual activity: Yes     Birth control/protection: None     Comment: uses protection sometimes   Lifestyle    Physical activity     Days per week: Not on file     Minutes per session: Not on file    Stress: Not on file  "  Relationships    Social connections     Talks on phone: Not on file     Gets together: Not on file     Attends [employer] service: Not on file     Active member of club or organization: Not on file     Attends meetings of clubs or organizations: Not on file     Relationship status: Not on file   Other Topics Concern    Not on file   Social History Narrative    Not on file       Family History   Problem Relation Age of Onset    Epilepsy Mother        Review of Systems  Review of Systems   Constitutional: Negative for chills, fever, malaise/fatigue and weight loss.   Respiratory: Positive for shortness of breath. Negative for cough, hemoptysis, sputum production and wheezing.    Cardiovascular: Positive for leg swelling. Negative for chest pain, palpitations, orthopnea, claudication and PND.   Gastrointestinal: Negative for blood in stool and melena.   Genitourinary: Negative for dysuria, flank pain, frequency, hematuria and urgency.   Neurological: Negative for dizziness, loss of consciousness and headaches.     A complete review of systems was otherwise negative.    Physical Exam  /76   Pulse 94   Temp 97.9 °F (36.6 °C) (Oral)   Ht 6' 5" (1.956 m)   Wt (!) 136.7 kg (301 lb 7.7 oz)   SpO2 96%   BMI 35.75 kg/m²   General appearance: alert, appears stated age, cooperative and no distress  Neck: no carotid bruit, no JVD, supple, symmetrical, trachea midline and thyroid not enlarged, symmetric, no tenderness/mass/nodules  Lungs: crackles noted to bilateral lung bases with increased rate, normal effort and no accesory muscle use, no wheezes, ortherwise normal breath sounds throughout  Heart: regular rate and rhythm, S1, S2 normal, no murmur, click, rub or gallop, pacemaker present  Extremities: edema dermatitis noted to BLE, Homans sign is negative, no sign of DVT and dermatitis noted to BLE with 2+ pitting edema  Pulses: 2+ and symmetric  Skin: Skin color, texture, turgor normal. No rashes or " lesions  Neurologic: Grossly normal, no focal neurological deficits present    Assessment/Plan  Hospital discharge follow-up  Overall, improving. Concern today given tachypnea and KING, although weight and swelling to lower extremities is stable. We will proceed with labs as listed below and x-ray chest PA and lateral. Recommend he call today to schedule follow-up with cardiologist, Dr. Long, phone number provided. Keep follow-up with electrophysioligist as scheduled. Return in one month for follow-up with PCP. ER return precautions discussed.  -     CBC Auto Differential; Future; Expected date: 11/18/2020  -     Comprehensive Metabolic Panel; Future; Expected date: 11/18/2020  -     BNP; Future; Expected date: 11/18/2020  -     Lipid Panel; Future; Expected date: 11/18/2020  -     X-Ray Chest PA And Lateral; Future; Expected date: 11/18/2020    Acute on chronic combined systolic and diastolic heart failure  Gradual improvement since hospitalization.  Check labs and x-ray today.  Follow-up with cardiology and electrophysiology.  Follow-up with PCP.  Continue current medication management.  -     Lipid Panel; Future; Expected date: 11/18/2020  -     X-Ray Chest PA And Lateral; Future; Expected date: 11/18/2020    Acute hypoxemic respiratory failure  Stable. Gradual improvement.  -     X-Ray Chest PA And Lateral; Future; Expected date: 11/18/2020    Severe sepsis  Resolved  -     CBC Auto Differential; Future; Expected date: 11/18/2020  -     Comprehensive Metabolic Panel; Future; Expected date: 11/18/2020    KING (dyspnea on exertion)  Noted today during exam, proceed with chest x-ray and labs as listed above.  -     X-Ray Chest PA And Lateral; Future; Expected date: 11/18/2020    Chronic combined systolic and diastolic congestive heart failure  Gradual improvement since hospitalization.  Check labs and x-ray today.  Follow-up with cardiology and electrophysiology.  Follow-up with PCP.  Continue current medication  management.  -     BNP; Future; Expected date: 11/18/2020    Congestive heart failure, unspecified HF chronicity, unspecified heart failure type  Gradual improvement since hospitalization.  Check labs and x-ray today.  Follow-up with cardiology and electrophysiology.  Follow-up with PCP.  Continue current medication management.  -     BNP; Future; Expected date: 11/18/2020    NICM (nonischemic cardiomyopathy)  Gradual improvement since hospitalization.  Check labs and x-ray today.  Follow-up with cardiology and electrophysiology.  Follow-up with PCP.  Continue current medication management.  -     BNP; Future; Expected date: 11/18/2020    Biventricular ICD (implantable cardioverter-defibrillator) in place  Stable. Followed by electrophysiology.  -     CBC Auto Differential; Future; Expected date: 11/18/2020    Severe obesity (BMI 35.0-39.9) with comorbidity  The patient is asked to make an attempt to improve diet and exercise patterns to aid in medical management of this problem.    Encounter for screening for HIV  Routine screening.  -     HIV 1/2 Ag/Ab (4th Gen); Future; Expected date: 11/18/2020    Screening for AAA (abdominal aortic aneurysm)  Former smoker, routine screening ordered.  -     US AAA Screening; Future; Expected date: 11/18/2020    Patient has verbalized understanding and is in agreement with plan of care.    Follow up in about 1 month (around 12/18/2020) for follow-up with PCP.

## 2020-11-19 PROBLEM — R31.9 HEMATURIA: Status: RESOLVED | Noted: 2020-10-10 | Resolved: 2020-11-19

## 2020-11-19 PROBLEM — A41.9 SEVERE SEPSIS: Status: RESOLVED | Noted: 2020-10-06 | Resolved: 2020-11-19

## 2020-11-19 PROBLEM — E87.70 HYPERVOLEMIA: Status: RESOLVED | Noted: 2020-11-18 | Resolved: 2020-11-19

## 2020-11-19 PROBLEM — J96.01 ACUTE HYPOXEMIC RESPIRATORY FAILURE: Status: RESOLVED | Noted: 2019-01-05 | Resolved: 2020-11-19

## 2020-11-19 PROBLEM — R29.898 DECREASED STRENGTH OF UPPER EXTREMITY: Status: RESOLVED | Noted: 2019-10-24 | Resolved: 2020-11-19

## 2020-11-19 PROBLEM — I50.9 CONGESTIVE HEART FAILURE: Status: RESOLVED | Noted: 2019-11-12 | Resolved: 2020-11-19

## 2020-11-19 PROBLEM — R65.20 SEVERE SEPSIS: Status: RESOLVED | Noted: 2020-10-06 | Resolved: 2020-11-19

## 2020-11-19 PROBLEM — N17.9 AKI (ACUTE KIDNEY INJURY): Status: RESOLVED | Noted: 2020-10-07 | Resolved: 2020-11-19

## 2020-11-19 LAB
ANION GAP SERPL CALC-SCNC: 8 MMOL/L (ref 8–16)
BASOPHILS # BLD AUTO: 0.03 K/UL (ref 0–0.2)
BASOPHILS NFR BLD: 0.6 % (ref 0–1.9)
BUN SERPL-MCNC: 16 MG/DL (ref 8–23)
CALCIUM SERPL-MCNC: 8.7 MG/DL (ref 8.7–10.5)
CHLORIDE SERPL-SCNC: 103 MMOL/L (ref 95–110)
CO2 SERPL-SCNC: 31 MMOL/L (ref 23–29)
CREAT SERPL-MCNC: 1.1 MG/DL (ref 0.5–1.4)
DIFFERENTIAL METHOD: ABNORMAL
EOSINOPHIL # BLD AUTO: 0.2 K/UL (ref 0–0.5)
EOSINOPHIL NFR BLD: 4 % (ref 0–8)
ERYTHROCYTE [DISTWIDTH] IN BLOOD BY AUTOMATED COUNT: 19.5 % (ref 11.5–14.5)
EST. GFR  (AFRICAN AMERICAN): >60 ML/MIN/1.73 M^2
EST. GFR  (NON AFRICAN AMERICAN): >60 ML/MIN/1.73 M^2
ESTIMATED AVG GLUCOSE: 131 MG/DL (ref 68–131)
GLUCOSE SERPL-MCNC: 89 MG/DL (ref 70–110)
HBA1C MFR BLD HPLC: 6.2 % (ref 4–5.6)
HCT VFR BLD AUTO: 34.8 % (ref 40–54)
HGB BLD-MCNC: 10.3 G/DL (ref 14–18)
IMM GRANULOCYTES # BLD AUTO: 0.01 K/UL (ref 0–0.04)
IMM GRANULOCYTES NFR BLD AUTO: 0.2 % (ref 0–0.5)
LYMPHOCYTES # BLD AUTO: 1.6 K/UL (ref 1–4.8)
LYMPHOCYTES NFR BLD: 30.7 % (ref 18–48)
MAGNESIUM SERPL-MCNC: 1.8 MG/DL (ref 1.6–2.6)
MCH RBC QN AUTO: 22.5 PG (ref 27–31)
MCHC RBC AUTO-ENTMCNC: 29.6 G/DL (ref 32–36)
MCV RBC AUTO: 76 FL (ref 82–98)
MONOCYTES # BLD AUTO: 0.5 K/UL (ref 0.3–1)
MONOCYTES NFR BLD: 9.3 % (ref 4–15)
NEUTROPHILS # BLD AUTO: 2.9 K/UL (ref 1.8–7.7)
NEUTROPHILS NFR BLD: 55.2 % (ref 38–73)
NRBC BLD-RTO: 0 /100 WBC
PHOSPHATE SERPL-MCNC: 4.5 MG/DL (ref 2.7–4.5)
PLATELET # BLD AUTO: 159 K/UL (ref 150–350)
PMV BLD AUTO: 11.6 FL (ref 9.2–12.9)
POTASSIUM SERPL-SCNC: 3.1 MMOL/L (ref 3.5–5.1)
RBC # BLD AUTO: 4.58 M/UL (ref 4.6–6.2)
SODIUM SERPL-SCNC: 142 MMOL/L (ref 136–145)
WBC # BLD AUTO: 5.27 K/UL (ref 3.9–12.7)

## 2020-11-19 PROCEDURE — 83036 HEMOGLOBIN GLYCOSYLATED A1C: CPT | Mod: HCNC

## 2020-11-19 PROCEDURE — 11000001 HC ACUTE MED/SURG PRIVATE ROOM: Mod: HCNC

## 2020-11-19 PROCEDURE — 80048 BASIC METABOLIC PNL TOTAL CA: CPT | Mod: HCNC

## 2020-11-19 PROCEDURE — 25000003 PHARM REV CODE 250: Mod: HCNC | Performed by: PHYSICIAN ASSISTANT

## 2020-11-19 PROCEDURE — 96376 TX/PRO/DX INJ SAME DRUG ADON: CPT

## 2020-11-19 PROCEDURE — G0378 HOSPITAL OBSERVATION PER HR: HCPCS | Mod: HCNC

## 2020-11-19 PROCEDURE — 36415 COLL VENOUS BLD VENIPUNCTURE: CPT | Mod: HCNC

## 2020-11-19 PROCEDURE — 85025 COMPLETE CBC W/AUTO DIFF WBC: CPT | Mod: HCNC

## 2020-11-19 PROCEDURE — 96372 THER/PROPH/DIAG INJ SC/IM: CPT

## 2020-11-19 PROCEDURE — 99226 PR SUBSEQUENT OBSERVATION CARE,LEVEL III: ICD-10-PCS | Mod: HCNC,GC,, | Performed by: INTERNAL MEDICINE

## 2020-11-19 PROCEDURE — 63600175 PHARM REV CODE 636 W HCPCS: Mod: HCNC | Performed by: PHYSICIAN ASSISTANT

## 2020-11-19 PROCEDURE — 84100 ASSAY OF PHOSPHORUS: CPT | Mod: HCNC

## 2020-11-19 PROCEDURE — 99226 PR SUBSEQUENT OBSERVATION CARE,LEVEL III: CPT | Mod: HCNC,GC,, | Performed by: INTERNAL MEDICINE

## 2020-11-19 PROCEDURE — 83735 ASSAY OF MAGNESIUM: CPT | Mod: HCNC

## 2020-11-19 RX ORDER — IBUPROFEN 200 MG
24 TABLET ORAL
Status: DISCONTINUED | OUTPATIENT
Start: 2020-11-19 | End: 2020-11-21 | Stop reason: HOSPADM

## 2020-11-19 RX ORDER — ONDANSETRON 8 MG/1
8 TABLET, ORALLY DISINTEGRATING ORAL EVERY 8 HOURS PRN
Status: DISCONTINUED | OUTPATIENT
Start: 2020-11-19 | End: 2020-11-21 | Stop reason: HOSPADM

## 2020-11-19 RX ORDER — METOPROLOL SUCCINATE 25 MG/1
25 TABLET, EXTENDED RELEASE ORAL DAILY
Status: DISCONTINUED | OUTPATIENT
Start: 2020-11-19 | End: 2020-11-21 | Stop reason: HOSPADM

## 2020-11-19 RX ORDER — GABAPENTIN 100 MG/1
100 CAPSULE ORAL 3 TIMES DAILY
Status: DISCONTINUED | OUTPATIENT
Start: 2020-11-19 | End: 2020-11-21 | Stop reason: HOSPADM

## 2020-11-19 RX ORDER — TALC
6 POWDER (GRAM) TOPICAL NIGHTLY PRN
Status: DISCONTINUED | OUTPATIENT
Start: 2020-11-19 | End: 2020-11-21 | Stop reason: HOSPADM

## 2020-11-19 RX ORDER — BISACODYL 10 MG
10 SUPPOSITORY, RECTAL RECTAL DAILY PRN
Status: DISCONTINUED | OUTPATIENT
Start: 2020-11-19 | End: 2020-11-21 | Stop reason: HOSPADM

## 2020-11-19 RX ORDER — ACETAMINOPHEN 325 MG/1
650 TABLET ORAL EVERY 4 HOURS PRN
Status: DISCONTINUED | OUTPATIENT
Start: 2020-11-19 | End: 2020-11-21 | Stop reason: HOSPADM

## 2020-11-19 RX ORDER — IPRATROPIUM BROMIDE AND ALBUTEROL SULFATE 2.5; .5 MG/3ML; MG/3ML
3 SOLUTION RESPIRATORY (INHALATION) EVERY 4 HOURS PRN
Status: DISCONTINUED | OUTPATIENT
Start: 2020-11-19 | End: 2020-11-21 | Stop reason: HOSPADM

## 2020-11-19 RX ORDER — SODIUM CHLORIDE 0.9 % (FLUSH) 0.9 %
5 SYRINGE (ML) INJECTION
Status: DISCONTINUED | OUTPATIENT
Start: 2020-11-19 | End: 2020-11-21 | Stop reason: HOSPADM

## 2020-11-19 RX ORDER — IBUPROFEN 200 MG
16 TABLET ORAL
Status: DISCONTINUED | OUTPATIENT
Start: 2020-11-19 | End: 2020-11-21 | Stop reason: HOSPADM

## 2020-11-19 RX ORDER — ENOXAPARIN SODIUM 100 MG/ML
40 INJECTION SUBCUTANEOUS EVERY 24 HOURS
Status: DISCONTINUED | OUTPATIENT
Start: 2020-11-19 | End: 2020-11-21 | Stop reason: HOSPADM

## 2020-11-19 RX ORDER — GLUCAGON 1 MG
1 KIT INJECTION
Status: DISCONTINUED | OUTPATIENT
Start: 2020-11-19 | End: 2020-11-21 | Stop reason: HOSPADM

## 2020-11-19 RX ORDER — PRAVASTATIN SODIUM 40 MG/1
80 TABLET ORAL DAILY
Status: DISCONTINUED | OUTPATIENT
Start: 2020-11-19 | End: 2020-11-21 | Stop reason: HOSPADM

## 2020-11-19 RX ORDER — ACETAMINOPHEN 500 MG
1000 TABLET ORAL EVERY 8 HOURS PRN
Status: DISCONTINUED | OUTPATIENT
Start: 2020-11-19 | End: 2020-11-21 | Stop reason: HOSPADM

## 2020-11-19 RX ORDER — FUROSEMIDE 10 MG/ML
80 INJECTION INTRAMUSCULAR; INTRAVENOUS 2 TIMES DAILY
Status: DISCONTINUED | OUTPATIENT
Start: 2020-11-19 | End: 2020-11-21 | Stop reason: HOSPADM

## 2020-11-19 RX ORDER — ASPIRIN 325 MG
325 TABLET ORAL DAILY
Status: DISCONTINUED | OUTPATIENT
Start: 2020-11-19 | End: 2020-11-21 | Stop reason: HOSPADM

## 2020-11-19 RX ORDER — POLYETHYLENE GLYCOL 3350 17 G/17G
17 POWDER, FOR SOLUTION ORAL DAILY
Status: DISCONTINUED | OUTPATIENT
Start: 2020-11-19 | End: 2020-11-21 | Stop reason: HOSPADM

## 2020-11-19 RX ADMIN — FUROSEMIDE 80 MG: 10 INJECTION, SOLUTION INTRAMUSCULAR; INTRAVENOUS at 08:11

## 2020-11-19 RX ADMIN — PRAVASTATIN SODIUM 80 MG: 40 TABLET ORAL at 08:11

## 2020-11-19 RX ADMIN — SACUBITRIL AND VALSARTAN 1 TABLET: 24; 26 TABLET, FILM COATED ORAL at 01:11

## 2020-11-19 RX ADMIN — SPIRONOLACTONE 12.5 MG: 25 TABLET, FILM COATED ORAL at 01:11

## 2020-11-19 RX ADMIN — ASPIRIN 325 MG ORAL TABLET 325 MG: 325 PILL ORAL at 08:11

## 2020-11-19 RX ADMIN — GABAPENTIN 100 MG: 100 CAPSULE ORAL at 09:11

## 2020-11-19 RX ADMIN — ENOXAPARIN SODIUM 40 MG: 40 INJECTION SUBCUTANEOUS at 04:11

## 2020-11-19 RX ADMIN — SACUBITRIL AND VALSARTAN 1 TABLET: 24; 26 TABLET, FILM COATED ORAL at 09:11

## 2020-11-19 RX ADMIN — GABAPENTIN 100 MG: 100 CAPSULE ORAL at 02:11

## 2020-11-19 RX ADMIN — FUROSEMIDE 80 MG: 10 INJECTION, SOLUTION INTRAMUSCULAR; INTRAVENOUS at 09:11

## 2020-11-19 RX ADMIN — METOPROLOL SUCCINATE 25 MG: 25 TABLET, EXTENDED RELEASE ORAL at 08:11

## 2020-11-19 RX ADMIN — MELATONIN TAB 3 MG 6 MG: 3 TAB at 09:11

## 2020-11-19 RX ADMIN — POLYETHYLENE GLYCOL 3350 17 G: 17 POWDER, FOR SOLUTION ORAL at 08:11

## 2020-11-19 RX ADMIN — GABAPENTIN 100 MG: 100 CAPSULE ORAL at 08:11

## 2020-11-19 NOTE — ED NOTES
Papito Bhakta, a 65 y.o. male presents to the ED w/ complaint of sent from [place] for elevated BNP and xray that showed fluid as well. Pt took all diuretics today this morning, still missing evening dose. Pt denies CP, SOB, NVD, changes to urinary or bowel patterns, fevers.    Triage note:  Chief Complaint   Patient presents with    Abnormal Lab     Pt sent from clinic for elevated BNP on labs drawn this am.  Pt denies chest pain or SOB.   Pt had pacemaker placed in September     Review of patient's allergies indicates:  No Known Allergies  Past Medical History:   Diagnosis Date    RIGOBERTO (acute kidney injury) 10/7/2020    Anticoagulant long-term use     Aspirin    CHF (congestive heart failure)     Hyperlipidemia     Hypertension     NICM (nonischemic cardiomyopathy) 6/16/2020    Obesity      Adult Physical Assessment  LOC: Papito Bhakta, 65 y.o. male verified via two identifiers.  The patient is awake, alert, oriented and speaking appropriately at this time.  APPEARANCE: Patient resting comfortably and appears to be in no acute distress at this time. Patient is clean and well groomed, patient's clothing is properly fastened.  SKIN:The skin is warm and dry, color consistent with ethnicity, patient has normal skin turgor and moist mucus membranes, skin intact, no breakdown or brusing noted.  MUSCULOSKELETAL: Patient moving all extremities well, no obvious swelling or deformities noted. BLE swelling, pt reports right calf is more swollen and tight than the left.  RESPIRATORY: Airway is open and patent, respirations are spontaneous, patient has a normal effort and rate, no accessory muscle use noted.  CARDIAC: Patient has a normal rate and rhythm, no periphreal edema noted in any extremity, capillary refill < 3 seconds in all extremities  ABDOMEN: Soft and non tender to palpation, no abdominal distention noted. Bowel sounds present in all four quadrants.  NEUROLOGIC: Eyes open spontaneously, behavior  appropriate to situation, follows commands, facial expression symmetrical, bilateral hand grasp equal and even, purposeful motor response noted, normal sensation in all extremities when touched with a finger.

## 2020-11-19 NOTE — PLAN OF CARE
Patient/ family updated on plan of care, all questions answered up to now regarding plan of care, will continue to monitor Bart Crow 11/19/2020 5:30 PM

## 2020-11-19 NOTE — ED PROVIDER NOTES
65-year-old male arrives Encounter Date: 11/18/2020       History     Chief Complaint   Patient presents with    Abnormal Lab     Pt sent from clinic for elevated BNP on labs drawn this am.  Pt denies chest pain or SOB.   Pt had pacemaker placed in September     65-year-old male arrives to the ER after being referred from clinic for abnormal labs and x-ray.  Past medical history significant for nonischemic cardiomyopathy, CHF with EF of 25%.  The patient was recently admitted to the hospital with an extensive stay, see discharge summaries for further details.  Since the patient was discharged at the end of October he has reported a 20-30 lb weight gain.  Patient has normal dry weight is 270 lb.  This morning the patient weighed 301 lb.  He denies any chest pain or shortness of breath.  He has been compliant with his Lasix.  Initially he was taking 80 mg once daily.  This was changed to 60 mg twice a day.    Patient had a clinic appointment today in a chest x-ray revealed pulmonary edema with an elevated BNP.  He referred to the ER.    Upon my initial assessment he is awake alert and oriented.  He is nondistressed appearing.  He does have lower extremity pitting edema up to the hips bilaterally.        Review of patient's allergies indicates:  No Known Allergies  Past Medical History:   Diagnosis Date    RIGOBERTO (acute kidney injury) 10/7/2020    Anticoagulant long-term use     Aspirin    CHF (congestive heart failure)     Hyperlipidemia     Hypertension     NICM (nonischemic cardiomyopathy) 6/16/2020    Obesity      Past Surgical History:   Procedure Laterality Date    INSERTION OF BIVENTRICULAR IMPLANTABLE CARDIOVERTER-DEFIBRILLATOR (ICD) N/A 2/13/2019    Procedure: INSERTION, ICD, BIVENTRICULAR;  Surgeon: Shailesh Eng MD;  Location: [facility] CATH LAB;  Service: Cardiology;  Laterality: N/A;  RN PRE OP 2-6-19  1ST CASE PER  RADHA. NOTIFIED RADHA THAT ANESTHESIA IS NOT PITO FOR 1ST CASE START-LO    INSERTION  OF BIVENTRICULAR IMPLANTABLE CARDIOVERTER-DEFIBRILLATOR (ICD) N/A 2020    Procedure: INSERTION, ICD, BIVENTRICULAR;  Surgeon: Jim Kwong MD;  Location: [facility];  Service: Cardiology;  Laterality: N/A;  NICM, CRT-D, SJM,, MAC, DM, 3 Prep*Wearing LifeVest*    oral extraction  2018    TESTICLE SURGERY       Family History   Problem Relation Age of Onset    Epilepsy Mother      Social History     Tobacco Use    Smoking status: Former Smoker     Packs/day: 0.50     Years: 40.00     Pack years: 20.00     Types: Cigarettes, Cigars     Quit date:      Years since quittin.8    Smokeless tobacco: Never Used   Substance Use Topics    Alcohol use: Yes     Comment: once a month    Drug use: No     Review of Systems   Constitutional: Negative for fever.   HENT: Negative for sore throat.    Respiratory: Negative for shortness of breath.    Cardiovascular: Negative for chest pain.   Gastrointestinal: Negative for nausea.   Genitourinary: Negative for dysuria.   Musculoskeletal: Negative for back pain.   Skin: Negative for rash.   Neurological: Negative for weakness.   Hematological: Does not bruise/bleed easily.       Physical Exam     Initial Vitals [20 1842]   BP Pulse Resp Temp SpO2   112/69 92 20 97.5 °F (36.4 °C) 99 %      MAP       --         Physical Exam    Constitutional: Vital signs are normal. He appears well-developed and well-nourished. He is not diaphoretic. No distress.   HENT:   Head: Normocephalic and atraumatic.   Right Ear: Hearing and external ear normal.   Left Ear: Hearing and external ear normal.   Eyes: Conjunctivae are normal.   Cardiovascular: Normal rate and regular rhythm.   Bilateral pitting edema to the lower extremities   Pulmonary/Chest:   Clear to auscultation bilateral   Abdominal: Soft. Normal appearance and bowel sounds are normal.   Soft and nontender   Musculoskeletal: Normal range of motion.   Neurological: He is alert and oriented to person, place, and  time.   Skin: Skin is warm and intact.   Psychiatric: He has a normal mood and affect. His speech is normal and behavior is normal. Cognition and memory are normal.         ED Course   Procedures  Labs Reviewed   SARS-COV-2 RDRP GENE          Imaging Results    None          Medical Decision Making:   Initial Assessment:   65-year-old male presenting for abnormal labs and chest x-ray  Differential Diagnosis:   CHF exacerbation, fluid overload, pneumonia, electrolyte abnormality  Clinical Tests:   Lab Tests: Reviewed and Ordered  Radiological Study: Reviewed  Medical Tests: Reviewed  ED Management:  65-year-old male with a CHF exacerbation, patient has had a 30 lb weight gain over the past 3 weeks despite compliance with his Lasix.  I have discussed the case with Hospital Medicine and the patient will be admitted to observation.  We will begin diuresis in the ER with 80 mg of Lasix 1 time IV.  Labs reviewed from clinic did not show an elevated troponin.  The patient does not have chest pain.  I doubt ACS.  I suspect his symptoms are all secondary to fluid overload   Other:   I have discussed this case with another health care provider.                             Clinical Impression:       ICD-10-CM ICD-9-CM   1. Hypervolemia, unspecified hypervolemia type  E87.70 276.69                      Disposition:   Disposition: Admitted  Condition: Stable     ED Disposition Condition    Observation                             Keyshawn Srivastava PA-C  11/18/20 [id number]

## 2020-11-19 NOTE — H&P
[facility] Hospital Medicine  History & Physical    Patient Name: Papito Bhakta  MRN: [id number]  Admission Date: 11/18/2020  Attending Physician: Derik Ma MD   Primary Care Provider: Brynn To MD    [facility] Medicine Team: [facility]P MED A Tony Quinn PA-C     Patient information was obtained from patient, past medical records and ER records.     Subjective:     Principal Problem:Acute on chronic combined systolic and diastolic heart failure    Chief Complaint:   Chief Complaint   Patient presents with    Abnormal Lab     Pt sent from clinic for elevated BNP on labs drawn this am.  Pt denies chest pain or SOB.   Pt had pacemaker placed in September        HPI: Papito Bhakta is a 65M with NICM with AICD and recent R CRT-D placement 9/27, hypertension, combined CHF (EF 18%, G3DD Sept 2020), hyperlipidemia, and obesity who presents from clinic for evaluation of weight gain and SOB. Patient with recent admission 09/28-10/15 for fluid overload, complicated by MICU admission for septic shock from serratia UTI, then discharged to rehab. He was started on entresto in the hospital and his BB was changed to MTP to allow better BP buffer. Apparently while in rehab his entresto was discontinued after being held for several days 2/2 borderline low BP and his lasix dose was decreased to support his BP better. His spironolactone was also discontinued. He was discharged from Rehab 10/29. Since being home he has gained 30#, dry weight around 270, today 301 in clinic with edema on CXR and . He was recommended to come into the ED for IV diuresis. The patient reports subjective dyspnea with minimal exertion, LE edema, and orthopnea. He reports compliance with meds, doubled up on his lasix. He admits he can do better with his diet. Denies cough, CP, fevers, urinary complaints.     Regarding his diuresis plan during last admission, he had several days of IV lasix pushes with moderate output  eventually requiring lasix gtt. He required BiPAP that admission, today comfortable on RA.     ED: AFVSS, no oxygen requirements, , CXR with edema, no leukocytosis    Past Medical History:   Diagnosis Date    RIGOBERTO (acute kidney injury) 10/7/2020    Anticoagulant long-term use     Aspirin    CHF (congestive heart failure)     Hyperlipidemia     Hypertension     NICM (nonischemic cardiomyopathy) 6/16/2020    Obesity        Past Surgical History:   Procedure Laterality Date    INSERTION OF BIVENTRICULAR IMPLANTABLE CARDIOVERTER-DEFIBRILLATOR (ICD) N/A 2/13/2019    Procedure: INSERTION, ICD, BIVENTRICULAR;  Surgeon: Shailesh Eng MD;  Location: [facility] CATH LAB;  Service: Cardiology;  Laterality: N/A;  RN PRE OP 2-6-19  1ST CASE PER  RADHA. NOTIFIED [facility] THAT ANESTHESIA IS NOT PITO FOR 1ST CASE START-LO    INSERTION OF BIVENTRICULAR IMPLANTABLE CARDIOVERTER-DEFIBRILLATOR (ICD) N/A 9/28/2020    Procedure: INSERTION, ICD, BIVENTRICULAR;  Surgeon: Jim Kwong MD;  Location: [facility] EP LAB;  Service: Cardiology;  Laterality: N/A;  NICM, CRT-D, SJM,, MAC, DM, 3 Prep*Wearing LifeVest*    oral extraction  11/2018    TESTICLE SURGERY         Review of patient's allergies indicates:  No Known Allergies    No current facility-administered medications on file prior to encounter.      Current Outpatient Medications on File Prior to Encounter   Medication Sig    furosemide (LASIX) 80 MG tablet Take 1 tablet (80 mg total) by mouth 2 (two) times a day. If weight increases by 5 pounds in 3 days or 3 lbs in 1 day, take 2 pills daily until weight decreases to baseline. If weight is stable, then take only 1 tablet daily. (Patient taking differently: Take 60 mg by mouth 2 (two) times a day. If weight increases by 5 pounds in 3 days or 3 lbs in 1 day, take 2 pills daily until weight decreases to baseline. If weight is stable, then take only 1 tablet daily.)    gabapentin (NEURONTIN) 100 MG capsule Take 100 mg by mouth  3 (three) times daily.    metoprolol succinate (TOPROL-XL) 25 MG 24 hr tablet Take 1 tablet (25 mg total) by mouth once daily.    pravastatin (PRAVACHOL) 80 MG tablet Take 1 tablet (80 mg total) by mouth once daily.    aspirin 325 MG tablet Take 1 tablet (325 mg total) by mouth once daily.    ibuprofen (ADVIL,MOTRIN) 200 MG tablet Take 1 tablet (200 mg total) by mouth nightly as needed for Pain.    sacubitriL-valsartan (ENTRESTO) 24-26 mg per tablet Take 1 tablet by mouth 2 (two) times daily. Hold if SBP < 110    spironolactone (ALDACTONE) 25 MG tablet Take 0.5 tablets (12.5 mg total) by mouth once daily. Hold if SBP < 110    [DISCONTINUED] ramipril (ALTACE) 10 MG capsule Take 1 capsule (10 mg total) by mouth once daily.     Family History     Problem Relation (Age of Onset)    Epilepsy Mother        Tobacco Use    Smoking status: Former Smoker     Packs/day: 0.50     Years: 40.00     Pack years: 20.00     Types: Cigarettes, Cigars     Quit date:      Years since quittin.8    Smokeless tobacco: Never Used   Substance and Sexual Activity    Alcohol use: Yes     Comment: once a month    Drug use: No    Sexual activity: Yes     Birth control/protection: None     Comment: uses protection sometimes     Review of Systems   Constitutional: Positive for unexpected weight change (weight gain ). Negative for activity change, chills, diaphoresis, fatigue and fever.   HENT: Negative for facial swelling and trouble swallowing.    Eyes: Negative for pain and visual disturbance.   Respiratory: Positive for shortness of breath. Negative for apnea, cough, choking, chest tightness and wheezing.    Cardiovascular: Positive for leg swelling. Negative for chest pain and palpitations.   Gastrointestinal: Negative for abdominal distention, abdominal pain, blood in stool, constipation, diarrhea, nausea and vomiting.   Endocrine: Negative for cold intolerance, heat intolerance and polyuria.   Genitourinary: Negative for  dysuria, enuresis, frequency and urgency.   Musculoskeletal: Negative for back pain and myalgias.   Skin: Negative for color change, pallor and wound.   Allergic/Immunologic: Negative for immunocompromised state.   Neurological: Negative for dizziness, tremors and weakness.   Hematological: Does not bruise/bleed easily.   Psychiatric/Behavioral: Negative for agitation, behavioral problems and confusion.     Objective:     Vital Signs (Most Recent):  Temp: 97.1 °F (36.2 °C) (11/19/20 0027)  Pulse: 87 (11/19/20 0027)  Resp: 18 (11/19/20 0027)  BP: 117/74 (11/19/20 0027)  SpO2: 96 % (11/19/20 0027) Vital Signs (24h Range):  Temp:  [97 °F (36.1 °C)-97.9 °F (36.6 °C)] 97.1 °F (36.2 °C)  Pulse:  [83-96] 87  Resp:  [18-22] 18  SpO2:  [95 %-99 %] 96 %  BP: (101-130)/(65-83) 117/74     Weight: 135.5 kg (298 lb 11.6 oz)  Body mass index is 35.42 kg/m².    Physical Exam  Vitals signs and nursing note reviewed.   Constitutional:       Appearance: Normal appearance. He is obese. He is not ill-appearing, toxic-appearing or diaphoretic.   HENT:      Head: Normocephalic and atraumatic.      Mouth/Throat:      Mouth: Mucous membranes are moist.      Pharynx: No oropharyngeal exudate.   Eyes:      General: No scleral icterus.     Extraocular Movements: Extraocular movements intact.      Conjunctiva/sclera: Conjunctivae normal.      Pupils: Pupils are equal, round, and reactive to light.   Neck:      Musculoskeletal: Normal range of motion and neck supple.   Cardiovascular:      Rate and Rhythm: Normal rate and regular rhythm.      Pulses: Normal pulses.      Heart sounds: No murmur.   Pulmonary:      Comments: On room air  Increased effort  Talking in complete sentences, no accessory muscle use  Course bilateral breath sounds      Chest:      Chest wall: No tenderness.   Abdominal:      General: Abdomen is flat. Bowel sounds are normal. There is distension.      Tenderness: There is no abdominal tenderness. There is no guarding or  rebound.   Musculoskeletal:      Right lower leg: Edema present.      Left lower leg: Edema present.      Comments: 2+ pitting edema bilaterally, woody texture   Lymphadenopathy:      Cervical: No cervical adenopathy.   Skin:     General: Skin is warm and dry.      Capillary Refill: Capillary refill takes less than 2 seconds.      Coloration: Skin is not jaundiced.   Neurological:      General: No focal deficit present.      Mental Status: He is alert and oriented to person, place, and time. Mental status is at baseline.   Psychiatric:         Mood and Affect: Mood normal.           CRANIAL NERVES     CN III, IV, VI   Pupils are equal, round, and reactive to light.       Significant Labs:   CBC:   Recent Labs   Lab 11/18/20  1058   WBC 6.59   HGB 11.9*   HCT 41.7        CMP:   Recent Labs   Lab 11/18/20  1058      K 3.6      CO2 27   GLU 86   BUN 15   CREATININE 1.1   CALCIUM 9.3   PROT 8.1   ALBUMIN 3.3*   BILITOT 1.1*   ALKPHOS 80   AST 15   ALT 9*   ANIONGAP 11   EGFRNONAA >60.0     Cardiac Markers:   Recent Labs   Lab 11/18/20  1058   *     Troponin: No results for input(s): TROPONINI in the last 48 hours.  TSH:   Recent Labs   Lab 10/07/20  0409   TSH 1.206     Urine Culture: No results for input(s): LABURIN in the last 48 hours.  Urine Studies: No results for input(s): COLORU, APPEARANCEUA, PHUR, SPECGRAV, PROTEINUA, GLUCUA, KETONESU, BILIRUBINUA, OCCULTUA, NITRITE, UROBILINOGEN, LEUKOCYTESUR, RBCUA, WBCUA, BACTERIA, SQUAMEPITHEL, HYALINECASTS in the last 48 hours.    Invalid input(s): WRIGHTSUR    Significant Imaging: I have reviewed all pertinent imaging results/findings within the past 24 hours.    Assessment/Plan:     * Acute on chronic combined systolic and diastolic heart failure  - subjective SOB with gross volume overload, no oxygen requirements, 30# weight gain ()  - did well with toprol 25 daily, entresto 24/26, aldactone 12.5 mg while at hospital, then discharged to  rehab  - while at rehab entresto discontinued, spironolactone discontinued, and lasix decreased 2/2 hypotension  - prior to above changes, was on carvedilol 12.5 BID, ramipiril, aldactone  - start lasix 80 mv IVP BID (was on lasix gtt last admission, but required bipap at that time)  - restart entresto 24/26, hold for SBP < 110  - continue MTP XL 25 mg daily  - continue to hold spironolactone pending BP trend  - strict I/Os, daily weights, fluid restrict, low salt diet    Results for orders placed during the hospital encounter of 09/28/20   Echo Color Flow Doppler? Yes    Narrative · The left ventricle is severely enlarged with severely decreased systolic   function. The estimated ejection fraction is 18%.  · There are segmental left ventricular wall motion abnormalities.  · There is moderate left ventricular eccentric hypertrophy.  · Grade III diastolic dysfunction.  · Severe left atrial enlargement.  · Moderate right ventricular enlargement.  · Low normal right ventricular systolic function.  · Severe right atrial enlargement.  · Moderate mitral regurgitation.  · Mild to moderate tricuspid regurgitation.  · There is pulmonary hypertension at least in the 50s.          Biventricular ICD (implantable cardioverter-defibrillator) in place  - stable    Severe obesity (BMI 35.0-39.9) with comorbidity  - obesity complicates all aspects of disease management from diagnostic modalities to treatment. Weight loss encouraged and health benefits explained to patient.      Essential hypertension  - see above    VTE Risk Mitigation (From admission, onward)         Ordered     enoxaparin injection 40 mg  Every 24 hours      11/19/20 0032     IP VTE HIGH RISK PATIENT  Once      11/19/20 0032                   Tony Quinn PA-C  Department of Hospital Medicine   [facility]

## 2020-11-19 NOTE — PROGRESS NOTES
[facility] Hospital Medicine  Progress Note    Patient Name: Papito Bhakta  MRN: [id number]  Patient Class: OP- Observation   Admission Date: 11/18/2020  Length of Stay: 0 days  Attending Physician: Brodie Galvan MD  Primary Care Provider: Brynn To MD    [facility] Medicine Team: [facility] A Brodie Galvan MD    HPI:     Papito Bhakta is a 65M with NICM with AICD and recent R CRT-D placement 9/27, hypertension, combined CHF (EF 18%, G3DD Sept 2020), hyperlipidemia, and obesity who presents from clinic for evaluation of weight gain and SOB. Patient with recent admission 09/28-10/15 for fluid overload, complicated by MICU admission for septic shock from serratia UTI, then discharged to rehab. He was started on entresto in the hospital and his BB was changed to MTP to allow better BP buffer. Apparently while in rehab his entresto was discontinued after being held for several days 2/2 borderline low BP and his lasix dose was decreased to support his BP better. His spironolactone was also discontinued. He was discharged from Rehab 10/29. Since being home he has gained 30#, dry weight around 270, today 301 in clinic with edema on CXR and . He was recommended to come into the ED for IV diuresis. The patient reports subjective dyspnea with minimal exertion, LE edema, and orthopnea. He reports compliance with meds, doubled up on his lasix. He admits he can do better with his diet. Denies cough, CP, fevers, urinary complaints.      Regarding his diuresis plan during last admission, he had several days of IV lasix pushes with moderate output eventually requiring lasix gtt. He required BiPAP that admission, today comfortable on RA.      ED: AFVSS, no oxygen requirements, , CXR with edema, no leukocytosis    Subjective:     Principal Problem:Acute on chronic combined systolic and diastolic heart failure    Interval History: Patient lying in bed, no acute distress. Son at  bedside. Reports SOB and lower extremity swelling improving. Patient had some questions regarding his medications. Reports responding well to diuresis regarding his urine output.       Review of Systems   Constitutional: Positive for activity change. Negative for appetite change, chills and fever.   HENT: Negative for congestion.    Eyes: Negative for visual disturbance.   Respiratory: Positive for shortness of breath. Negative for cough.    Cardiovascular: Positive for leg swelling. Negative for chest pain.   Gastrointestinal: Negative for abdominal distention, abdominal pain, nausea and vomiting.   Genitourinary: Negative for dysuria.   Neurological: Positive for weakness. Negative for dizziness and headaches.   Psychiatric/Behavioral: Negative for confusion.        Objective:     Vital Signs (Most Recent):  Temp: (!) 95.4 °F (35.2 °C) (11/19/20 1053)  Pulse: 90 (11/19/20 1053)  Resp: (!) 32 (11/19/20 1053)  BP: 117/84 (11/19/20 1053)  SpO2: (!) 92 % (11/19/20 1053) Vital Signs (24h Range):  Temp:  [95.4 °F (35.2 °C)-97.5 °F (36.4 °C)] 95.4 °F (35.2 °C)  Pulse:  [83-96] 90  Resp:  [18-32] 32  SpO2:  [92 %-99 %] 92 %  BP: (101-127)/(65-87) 117/84     Weight: 136 kg (299 lb 13.2 oz)  Body mass index is 35.55 kg/m².    Intake/Output Summary (Last 24 hours) at 11/19/2020 1100  Last data filed at 11/19/2020 1000  Gross per 24 hour   Intake 180 ml   Output 4650 ml   Net -4470 ml      Physical Exam  Constitutional:       Appearance: Normal appearance.   HENT:      Head: Normocephalic.      Mouth/Throat:      Mouth: Mucous membranes are moist.   Eyes:      Extraocular Movements: Extraocular movements intact.      Pupils: Pupils are equal, round, and reactive to light.   Neck:      Musculoskeletal: Normal range of motion.      Comments: JVD  Cardiovascular:      Rate and Rhythm: Normal rate and regular rhythm.      Pulses: Normal pulses.      Heart sounds: Normal heart sounds.   Pulmonary:      Effort: Pulmonary effort is  normal.      Breath sounds: Normal breath sounds.   Abdominal:      General: There is no distension.      Tenderness: There is no abdominal tenderness.   Musculoskeletal: Normal range of motion.      Right lower leg: Edema present.      Left lower leg: Edema present.   Skin:     General: Skin is warm.   Neurological:      General: No focal deficit present.      Mental Status: He is alert.   Psychiatric:         Mood and Affect: Mood normal.           Significant Labs:   A1C:   Recent Labs   Lab 11/19/20  0300   HGBA1C 6.2*     CBC:   Recent Labs   Lab 11/18/20  1058 11/19/20  0300   WBC 6.59 5.27   HGB 11.9* 10.3*   HCT 41.7 34.8*    159     CMP:   Recent Labs   Lab 11/18/20  1058 11/19/20  0300    142   K 3.6 3.1*    103   CO2 27 31*   GLU 86 89   BUN 15 16   CREATININE 1.1 1.1   CALCIUM 9.3 8.7   PROT 8.1  --    ALBUMIN 3.3*  --    BILITOT 1.1*  --    ALKPHOS 80  --    AST 15  --    ALT 9*  --    ANIONGAP 11 8   EGFRNONAA >60.0 >60.0     Coagulation: No results for input(s): PT, INR, APTT in the last 48 hours.  Magnesium:   Recent Labs   Lab 11/19/20  0300   MG 1.8     POCT Glucose: No results for input(s): POCTGLUCOSE in the last 48 hours.  Troponin: No results for input(s): TROPONINI in the last 48 hours.  TSH:   Recent Labs   Lab 10/07/20  0409   TSH 1.206     Urine Studies: No results for input(s): COLORU, APPEARANCEUA, PHUR, SPECGRAV, PROTEINUA, GLUCUA, KETONESU, BILIRUBINUA, OCCULTUA, NITRITE, UROBILINOGEN, LEUKOCYTESUR, RBCUA, WBCUA, BACTERIA, SQUAMEPITHEL, HYALINECASTS in the last 48 hours.    Invalid input(s): WRIGHTSUR    Significant Imaging: I have reviewed and interpreted all pertinent imaging results/findings within the past 24 hours.    Assessment/Plan:      Active Diagnoses:    Diagnosis Date Noted POA    PRINCIPAL PROBLEM:  Acute on chronic combined systolic and diastolic heart failure [I50.43] 09/30/2020 Yes    Biventricular ICD (implantable cardioverter-defibrillator) in  place [Z95.810] 05/14/2019 Yes    Severe obesity (BMI 35.0-39.9) with comorbidity [E66.01] 05/01/2019 Yes    Essential hypertension [I10] 12/01/2017 Yes     Chronic      Problems Resolved During this Admission:     Scheduled Meds:   aspirin  325 mg Oral Daily    enoxaparin  40 mg Subcutaneous Q24H    furosemide (LASIX) IV  80 mg Intravenous BID    gabapentin  100 mg Oral TID    metoprolol succinate  25 mg Oral Daily    polyethylene glycol  17 g Oral Daily    pravastatin  80 mg Oral Daily    sacubitriL-valsartan  1 tablet Oral BID    spironolactone  12.5 mg Oral Daily     Continuous Infusions:  PRN Meds:.acetaminophen, acetaminophen, albuterol-ipratropium, bisacodyL, dextrose 50%, dextrose 50%, glucagon (human recombinant), glucose, glucose, melatonin, ondansetron, promethazine (PHENERGAN) IVPB, sodium chloride 0.9%    PLAN:    Acute on chronic combined systolic and diastolic heart failure  - subjective SOB with gross volume overload, no oxygen requirements, 30# weight gain ()  - did well with toprol 25 daily, entresto 24/26, aldactone 12.5 mg while at hospital, then discharged to rehab  - while at rehab entresto discontinued, spironolactone discontinued, and lasix decreased 2/2 hypotension  - prior to above changes, was on carvedilol 12.5 BID, ramipiril, aldactone  - start lasix 80 mv IVP BID (was on lasix gtt last admission, but required bipap at that time)  - restart entresto 24/26, hold for SBP < 110  - continue MTP XL 25 mg daily  - continue to hold spironolactone pending BP trend  - strict I/Os, daily weights, fluid restrict, low salt diet      Biventricular ICD (implantable cardioverter-defibrillator) in place  - stable     Severe obesity (BMI 35.0-39.9) with comorbidity  - obesity complicates all aspects of disease management from diagnostic modalities to treatment. Weight loss encouraged and health benefits explained to patient.        Essential hypertension  - see above      VTE Risk  Mitigation (From admission, onward)         Ordered     enoxaparin injection 40 mg  Every 24 hours      11/19/20 0032     IP VTE HIGH RISK PATIENT  Once      11/19/20 0032              Time spent in care of patient: > 35 minutes       Brodie Galvan MD  Department of Hospital Medicine   [facility]

## 2020-11-19 NOTE — ED NOTES
Patient on stretcher, Bed placed in low/locked position, side rails up x 2, call light is within reach of patient or family, Patient placed into position of comfort. Patient in NAD and offers no complaints at this time. Pain goals addressed. Will continue to monitor.

## 2020-11-19 NOTE — HPI
Papito Bhakta is a 65M with NICM with AICD and recent R CRT-D placement 9/27, hypertension, combined CHF (EF 18%, G3DD Sept 2020), hyperlipidemia, and obesity who presents from clinic for evaluation of weight gain and SOB. Patient with recent admission 09/28-10/15 for fluid overload, complicated by MICU admission for septic shock from serratia UTI, then discharged to rehab. He was started on entresto in the hospital and his BB was changed to MTP to allow better BP buffer. Apparently while in rehab his entresto was discontinued after being held for several days 2/2 borderline low BP and his lasix dose was decreased to support his BP better. His spironolactone was also discontinued. He was discharged from Rehab 10/29. Since being home he has gained 30#, dry weight around 270, today 301 in clinic with edema on CXR and . He was recommended to come into the ED for IV diuresis. The patient reports subjective dyspnea with minimal exertion, LE edema, and orthopnea. He reports compliance with meds, doubled up on his lasix. He admits he can do better with his diet. Denies cough, CP, fevers, urinary complaints.     Regarding his diuresis plan during last admission, he had several days of IV lasix pushes with moderate output eventually requiring lasix gtt. He required BiPAP that admission, today comfortable on RA.     ED: AFVSS, no oxygen requirements, , CXR with edema, no leukocytosis

## 2020-11-19 NOTE — ED NOTES
Gianna RN accepted report, nurse aware of pt status and last set of vitals. MD cleared patient for admission, pt stable for transport. Nurse informed of orders completed, outstanding orders, and orders unable to be completed in the ED. RN informed if any isolation precautions are required. RN agreeable with report and agrees to accept patient. Pt belongings and valuables remain with patient for transport.

## 2020-11-19 NOTE — SUBJECTIVE & OBJECTIVE
Past Medical History:   Diagnosis Date    RIGOBERTO (acute kidney injury) 10/7/2020    Anticoagulant long-term use     Aspirin    CHF (congestive heart failure)     Hyperlipidemia     Hypertension     NICM (nonischemic cardiomyopathy) 6/16/2020    Obesity        Past Surgical History:   Procedure Laterality Date    INSERTION OF BIVENTRICULAR IMPLANTABLE CARDIOVERTER-DEFIBRILLATOR (ICD) N/A 2/13/2019    Procedure: INSERTION, ICD, BIVENTRICULAR;  Surgeon: Shailesh Eng MD;  Location: [facility] CATH LAB;  Service: Cardiology;  Laterality: N/A;  RN PRE OP 2-6-19  1ST CASE PER  RADHA. NOTIFIED RADHA THAT ANESTHESIA IS NOT PITO FOR 1ST CASE START-LO    INSERTION OF BIVENTRICULAR IMPLANTABLE CARDIOVERTER-DEFIBRILLATOR (ICD) N/A 9/28/2020    Procedure: INSERTION, ICD, BIVENTRICULAR;  Surgeon: Jim Kwong MD;  Location: [facility] EP LAB;  Service: Cardiology;  Laterality: N/A;  NICM, CRT-D, SJM,, MAC, DM, 3 Prep*Wearing LifeVest*    oral extraction  11/2018    TESTICLE SURGERY         Review of patient's allergies indicates:  No Known Allergies    No current facility-administered medications on file prior to encounter.      Current Outpatient Medications on File Prior to Encounter   Medication Sig    furosemide (LASIX) 80 MG tablet Take 1 tablet (80 mg total) by mouth 2 (two) times a day. If weight increases by 5 pounds in 3 days or 3 lbs in 1 day, take 2 pills daily until weight decreases to baseline. If weight is stable, then take only 1 tablet daily. (Patient taking differently: Take 60 mg by mouth 2 (two) times a day. If weight increases by 5 pounds in 3 days or 3 lbs in 1 day, take 2 pills daily until weight decreases to baseline. If weight is stable, then take only 1 tablet daily.)    gabapentin (NEURONTIN) 100 MG capsule Take 100 mg by mouth 3 (three) times daily.    metoprolol succinate (TOPROL-XL) 25 MG 24 hr tablet Take 1 tablet (25 mg total) by mouth once daily.    pravastatin (PRAVACHOL) 80 MG tablet Take  1 tablet (80 mg total) by mouth once daily.    aspirin 325 MG tablet Take 1 tablet (325 mg total) by mouth once daily.    ibuprofen (ADVIL,MOTRIN) 200 MG tablet Take 1 tablet (200 mg total) by mouth nightly as needed for Pain.    sacubitriL-valsartan (ENTRESTO) 24-26 mg per tablet Take 1 tablet by mouth 2 (two) times daily. Hold if SBP < 110    spironolactone (ALDACTONE) 25 MG tablet Take 0.5 tablets (12.5 mg total) by mouth once daily. Hold if SBP < 110    [DISCONTINUED] ramipril (ALTACE) 10 MG capsule Take 1 capsule (10 mg total) by mouth once daily.     Family History     Problem Relation (Age of Onset)    Epilepsy Mother        Tobacco Use    Smoking status: Former Smoker     Packs/day: 0.50     Years: 40.00     Pack years: 20.00     Types: Cigarettes, Cigars     Quit date:      Years since quittin.8    Smokeless tobacco: Never Used   Substance and Sexual Activity    Alcohol use: Yes     Comment: once a month    Drug use: No    Sexual activity: Yes     Birth control/protection: None     Comment: uses protection sometimes     Review of Systems   Constitutional: Positive for unexpected weight change (weight gain ). Negative for activity change, chills, diaphoresis, fatigue and fever.   HENT: Negative for facial swelling and trouble swallowing.    Eyes: Negative for pain and visual disturbance.   Respiratory: Positive for shortness of breath. Negative for apnea, cough, choking, chest tightness and wheezing.    Cardiovascular: Positive for leg swelling. Negative for chest pain and palpitations.   Gastrointestinal: Negative for abdominal distention, abdominal pain, blood in stool, constipation, diarrhea, nausea and vomiting.   Endocrine: Negative for cold intolerance, heat intolerance and polyuria.   Genitourinary: Negative for dysuria, enuresis, frequency and urgency.   Musculoskeletal: Negative for back pain and myalgias.   Skin: Negative for color change, pallor and wound.   Allergic/Immunologic:  Negative for immunocompromised state.   Neurological: Negative for dizziness, tremors and weakness.   Hematological: Does not bruise/bleed easily.   Psychiatric/Behavioral: Negative for agitation, behavioral problems and confusion.     Objective:     Vital Signs (Most Recent):  Temp: 97.1 °F (36.2 °C) (11/19/20 0027)  Pulse: 87 (11/19/20 0027)  Resp: 18 (11/19/20 0027)  BP: 117/74 (11/19/20 0027)  SpO2: 96 % (11/19/20 0027) Vital Signs (24h Range):  Temp:  [97 °F (36.1 °C)-97.9 °F (36.6 °C)] 97.1 °F (36.2 °C)  Pulse:  [83-96] 87  Resp:  [18-22] 18  SpO2:  [95 %-99 %] 96 %  BP: (101-130)/(65-83) 117/74     Weight: 135.5 kg (298 lb 11.6 oz)  Body mass index is 35.42 kg/m².    Physical Exam  Vitals signs and nursing note reviewed.   Constitutional:       Appearance: Normal appearance. He is obese. He is not ill-appearing, toxic-appearing or diaphoretic.   HENT:      Head: Normocephalic and atraumatic.      Mouth/Throat:      Mouth: Mucous membranes are moist.      Pharynx: No oropharyngeal exudate.   Eyes:      General: No scleral icterus.     Extraocular Movements: Extraocular movements intact.      Conjunctiva/sclera: Conjunctivae normal.      Pupils: Pupils are equal, round, and reactive to light.   Neck:      Musculoskeletal: Normal range of motion and neck supple.   Cardiovascular:      Rate and Rhythm: Normal rate and regular rhythm.      Pulses: Normal pulses.      Heart sounds: No murmur.   Pulmonary:      Comments: On room air  Increased effort  Talking in complete sentences, no accessory muscle use  Course bilateral breath sounds      Chest:      Chest wall: No tenderness.   Abdominal:      General: Abdomen is flat. Bowel sounds are normal. There is distension.      Tenderness: There is no abdominal tenderness. There is no guarding or rebound.   Musculoskeletal:      Right lower leg: Edema present.      Left lower leg: Edema present.      Comments: 2+ pitting edema bilaterally, woody texture    Lymphadenopathy:      Cervical: No cervical adenopathy.   Skin:     General: Skin is warm and dry.      Capillary Refill: Capillary refill takes less than 2 seconds.      Coloration: Skin is not jaundiced.   Neurological:      General: No focal deficit present.      Mental Status: He is alert and oriented to person, place, and time. Mental status is at baseline.   Psychiatric:         Mood and Affect: Mood normal.           CRANIAL NERVES     CN III, IV, VI   Pupils are equal, round, and reactive to light.       Significant Labs:   CBC:   Recent Labs   Lab 11/18/20  1058   WBC 6.59   HGB 11.9*   HCT 41.7        CMP:   Recent Labs   Lab 11/18/20  1058      K 3.6      CO2 27   GLU 86   BUN 15   CREATININE 1.1   CALCIUM 9.3   PROT 8.1   ALBUMIN 3.3*   BILITOT 1.1*   ALKPHOS 80   AST 15   ALT 9*   ANIONGAP 11   EGFRNONAA >60.0     Cardiac Markers:   Recent Labs   Lab 11/18/20  1058   *     Troponin: No results for input(s): TROPONINI in the last 48 hours.  TSH:   Recent Labs   Lab 10/07/20  0409   TSH 1.206     Urine Culture: No results for input(s): LABURIN in the last 48 hours.  Urine Studies: No results for input(s): COLORU, APPEARANCEUA, PHUR, SPECGRAV, PROTEINUA, GLUCUA, KETONESU, BILIRUBINUA, OCCULTUA, NITRITE, UROBILINOGEN, LEUKOCYTESUR, RBCUA, WBCUA, BACTERIA, SQUAMEPITHEL, HYALINECASTS in the last 48 hours.    Invalid input(s): WRIGHTSUR    Significant Imaging: I have reviewed all pertinent imaging results/findings within the past 24 hours.

## 2020-11-19 NOTE — PLAN OF CARE
Pt resting in bed in NAD. Has had good urine output through the condom cath. Slept well this shift and no c/o pain. No safety issues this shift

## 2020-11-19 NOTE — ED NOTES
Patient on stretcher, Bed placed in low/locked position, side rails up x 2, call light is within reach of patient or family, Patient placed into position of comfort. Patient in NAD and offers no complaints at this time. Pain goals addressed. Will continue to monitor.\

## 2020-11-19 NOTE — ASSESSMENT & PLAN NOTE
- subjective SOB with gross volume overload, no oxygen requirements, 30# weight gain ()  - did well with toprol 25 daily, entresto 24/26, aldactone 12.5 mg while at hospital, then discharged to rehab  - while at rehab entresto discontinued, spironolactone discontinued, and lasix decreased 2/2 hypotension  - prior to above changes, was on carvedilol 12.5 BID, ramipiril, aldactone  - start lasix 80 mv IVP BID (was on lasix gtt last admission, but required bipap at that time)  - restart entresto 24/26, hold for SBP < 110  - continue MTP XL 25 mg daily  - continue to hold spironolactone pending BP trend  - strict I/Os, daily weights, fluid restrict, low salt diet    Results for orders placed during the hospital encounter of 09/28/20   Echo Color Flow Doppler? Yes    Narrative · The left ventricle is severely enlarged with severely decreased systolic   function. The estimated ejection fraction is 18%.  · There are segmental left ventricular wall motion abnormalities.  · There is moderate left ventricular eccentric hypertrophy.  · Grade III diastolic dysfunction.  · Severe left atrial enlargement.  · Moderate right ventricular enlargement.  · Low normal right ventricular systolic function.  · Severe right atrial enlargement.  · Moderate mitral regurgitation.  · Mild to moderate tricuspid regurgitation.  · There is pulmonary hypertension at least in the 50s.

## 2020-11-19 NOTE — PLAN OF CARE
Pt sleeping and no family at bedside. CM completed assessment with medical records and bedside nurse assistance. Pt lives with daughter and grandchild. Daughter is available to assist at discharge.        11/19/20 1708   Discharge Assessment   Assessment Type Discharge Planning Assessment   Confirmed/corrected address and phone number on facesheet? Yes   Assessment information obtained from? Medical Record  (Pt sleeping)   Expected Length of Stay (days) 5   Communicated expected length of stay with patient/caregiver no   Prior to hospitilization cognitive status: Alert/Oriented   Prior to hospitalization functional status: Independent;Assistive Equipment   Current cognitive status: Unable to Assess  (pt sleeping)   Current Functional Status: Independent;Assistive Equipment   Lives With child(sourav), adult;grandchild(sourav)   Able to Return to Prior Arrangements yes   Is patient able to care for self after discharge? Unable to determine at this time (comments)   Who are your caregiver(s) and their phone number(s)? Annie Pyle [phone]   Readmission Within the Last 30 Days no previous admission in last 30 days   Patient currently receives any other outside agency services? No   Equipment Currently Used at Home walker, rolling;raised toilet;wheelchair   Do you have any problems affording any of your prescribed medications? TBD   Is the patient taking medications as prescribed? yes   Does the patient have transportation home? Yes   Transportation Anticipated family or friend will provide   Does the patient receive services at the [facility]? No   Discharge Plan A Home with family   Discharge Plan B Home Health   DME Needed Upon Discharge  other (see comments)  (TBD)   Patient/Family in Agreement with Plan unable to assess  (pt sleeping)       Shaina Hills RN  PRN  - [facility]  [id number]

## 2020-11-20 PROBLEM — E87.70 HYPERVOLEMIA: Status: ACTIVE | Noted: 2020-11-20

## 2020-11-20 LAB
ANION GAP SERPL CALC-SCNC: 10 MMOL/L (ref 8–16)
BASOPHILS # BLD AUTO: 0.03 K/UL (ref 0–0.2)
BASOPHILS NFR BLD: 0.5 % (ref 0–1.9)
BUN SERPL-MCNC: 14 MG/DL (ref 8–23)
CALCIUM SERPL-MCNC: 8.8 MG/DL (ref 8.7–10.5)
CHLORIDE SERPL-SCNC: 103 MMOL/L (ref 95–110)
CO2 SERPL-SCNC: 29 MMOL/L (ref 23–29)
CREAT SERPL-MCNC: 0.9 MG/DL (ref 0.5–1.4)
DIFFERENTIAL METHOD: ABNORMAL
EOSINOPHIL # BLD AUTO: 0.2 K/UL (ref 0–0.5)
EOSINOPHIL NFR BLD: 2.6 % (ref 0–8)
ERYTHROCYTE [DISTWIDTH] IN BLOOD BY AUTOMATED COUNT: 19.6 % (ref 11.5–14.5)
EST. GFR  (AFRICAN AMERICAN): >60 ML/MIN/1.73 M^2
EST. GFR  (NON AFRICAN AMERICAN): >60 ML/MIN/1.73 M^2
GLUCOSE SERPL-MCNC: 84 MG/DL (ref 70–110)
HCT VFR BLD AUTO: 37.3 % (ref 40–54)
HGB BLD-MCNC: 11 G/DL (ref 14–18)
IMM GRANULOCYTES # BLD AUTO: 0.01 K/UL (ref 0–0.04)
IMM GRANULOCYTES NFR BLD AUTO: 0.2 % (ref 0–0.5)
LYMPHOCYTES # BLD AUTO: 1.7 K/UL (ref 1–4.8)
LYMPHOCYTES NFR BLD: 27.7 % (ref 18–48)
MAGNESIUM SERPL-MCNC: 1.7 MG/DL (ref 1.6–2.6)
MCH RBC QN AUTO: 22.5 PG (ref 27–31)
MCHC RBC AUTO-ENTMCNC: 29.5 G/DL (ref 32–36)
MCV RBC AUTO: 76 FL (ref 82–98)
MONOCYTES # BLD AUTO: 0.6 K/UL (ref 0.3–1)
MONOCYTES NFR BLD: 9.1 % (ref 4–15)
NEUTROPHILS # BLD AUTO: 3.7 K/UL (ref 1.8–7.7)
NEUTROPHILS NFR BLD: 59.9 % (ref 38–73)
NRBC BLD-RTO: 0 /100 WBC
PHOSPHATE SERPL-MCNC: 3.4 MG/DL (ref 2.7–4.5)
PLATELET # BLD AUTO: 166 K/UL (ref 150–350)
PMV BLD AUTO: ABNORMAL FL (ref 9.2–12.9)
POTASSIUM SERPL-SCNC: 3.2 MMOL/L (ref 3.5–5.1)
RBC # BLD AUTO: 4.88 M/UL (ref 4.6–6.2)
SODIUM SERPL-SCNC: 142 MMOL/L (ref 136–145)
WBC # BLD AUTO: 6.13 K/UL (ref 3.9–12.7)

## 2020-11-20 PROCEDURE — 94761 N-INVAS EAR/PLS OXIMETRY MLT: CPT | Mod: HCNC

## 2020-11-20 PROCEDURE — 84100 ASSAY OF PHOSPHORUS: CPT | Mod: HCNC

## 2020-11-20 PROCEDURE — 11000001 HC ACUTE MED/SURG PRIVATE ROOM: Mod: HCNC

## 2020-11-20 PROCEDURE — 25000003 PHARM REV CODE 250: Mod: HCNC | Performed by: PHYSICIAN ASSISTANT

## 2020-11-20 PROCEDURE — 63600175 PHARM REV CODE 636 W HCPCS: Mod: HCNC | Performed by: PHYSICIAN ASSISTANT

## 2020-11-20 PROCEDURE — 80048 BASIC METABOLIC PNL TOTAL CA: CPT | Mod: HCNC

## 2020-11-20 PROCEDURE — 96376 TX/PRO/DX INJ SAME DRUG ADON: CPT

## 2020-11-20 PROCEDURE — 83735 ASSAY OF MAGNESIUM: CPT | Mod: HCNC

## 2020-11-20 PROCEDURE — 99232 SBSQ HOSP IP/OBS MODERATE 35: CPT | Mod: HCNC,,, | Performed by: INTERNAL MEDICINE

## 2020-11-20 PROCEDURE — 85025 COMPLETE CBC W/AUTO DIFF WBC: CPT | Mod: HCNC

## 2020-11-20 PROCEDURE — 36415 COLL VENOUS BLD VENIPUNCTURE: CPT | Mod: HCNC

## 2020-11-20 PROCEDURE — 99900035 HC TECH TIME PER 15 MIN (STAT): Mod: HCNC

## 2020-11-20 PROCEDURE — 99232 PR SUBSEQUENT HOSPITAL CARE,LEVL II: ICD-10-PCS | Mod: HCNC,,, | Performed by: INTERNAL MEDICINE

## 2020-11-20 PROCEDURE — 25000003 PHARM REV CODE 250: Mod: HCNC | Performed by: INTERNAL MEDICINE

## 2020-11-20 RX ORDER — POTASSIUM CHLORIDE 20 MEQ/1
40 TABLET, EXTENDED RELEASE ORAL EVERY 4 HOURS
Status: DISPENSED | OUTPATIENT
Start: 2020-11-20 | End: 2020-11-20

## 2020-11-20 RX ADMIN — SPIRONOLACTONE 12.5 MG: 25 TABLET, FILM COATED ORAL at 08:11

## 2020-11-20 RX ADMIN — POLYETHYLENE GLYCOL 3350 17 G: 17 POWDER, FOR SOLUTION ORAL at 08:11

## 2020-11-20 RX ADMIN — WHITE PETROLATUM: 1.75 OINTMENT TOPICAL at 08:11

## 2020-11-20 RX ADMIN — POTASSIUM CHLORIDE 40 MEQ: 1500 TABLET, EXTENDED RELEASE ORAL at 02:11

## 2020-11-20 RX ADMIN — GABAPENTIN 100 MG: 100 CAPSULE ORAL at 05:11

## 2020-11-20 RX ADMIN — METOPROLOL SUCCINATE 25 MG: 25 TABLET, EXTENDED RELEASE ORAL at 08:11

## 2020-11-20 RX ADMIN — PRAVASTATIN SODIUM 80 MG: 40 TABLET ORAL at 08:11

## 2020-11-20 RX ADMIN — GABAPENTIN 100 MG: 100 CAPSULE ORAL at 08:11

## 2020-11-20 RX ADMIN — FUROSEMIDE 80 MG: 10 INJECTION, SOLUTION INTRAMUSCULAR; INTRAVENOUS at 08:11

## 2020-11-20 RX ADMIN — ASPIRIN 325 MG ORAL TABLET 325 MG: 325 PILL ORAL at 08:11

## 2020-11-20 RX ADMIN — ENOXAPARIN SODIUM 40 MG: 40 INJECTION SUBCUTANEOUS at 05:11

## 2020-11-20 RX ADMIN — SACUBITRIL AND VALSARTAN 1 TABLET: 24; 26 TABLET, FILM COATED ORAL at 08:11

## 2020-11-20 RX ADMIN — WHITE PETROLATUM: 1.75 OINTMENT TOPICAL at 02:11

## 2020-11-20 NOTE — PLAN OF CARE
Problem: Fall Injury Risk  Goal: Absence of Fall and Fall-Related Injury  Outcome: Ongoing, Progressing     Problem: Adult Inpatient Plan of Care  Goal: Plan of Care Review  Outcome: Ongoing, Progressing  Goal: Patient-Specific Goal (Individualization)  Outcome: Ongoing, Progressing  Goal: Absence of Hospital-Acquired Illness or Injury  Outcome: Ongoing, Progressing  Goal: Optimal Comfort and Wellbeing  Outcome: Ongoing, Progressing  Goal: Readiness for Transition of Care  Outcome: Ongoing, Progressing  Goal: Rounds/Family Conference  Outcome: Ongoing, Progressing     Problem: Adjustment to Illness (Sepsis/Septic Shock)  Goal: Optimal Coping  Outcome: Ongoing, Progressing     Problem: Bleeding (Sepsis/Septic Shock)  Goal: Absence of Bleeding  Outcome: Ongoing, Progressing     Problem: Glycemic Control Impaired (Sepsis/Septic Shock)  Goal: Blood Glucose Level Within Desired Range  Outcome: Ongoing, Progressing     Problem: Hemodynamic Instability (Sepsis/Septic Shock)  Goal: Effective Tissue Perfusion  Outcome: Ongoing, Progressing     Problem: Infection (Sepsis/Septic Shock)  Goal: Absence of Infection Signs/Symptoms  Outcome: Ongoing, Progressing     Problem: Nutrition Impaired (Sepsis/Septic Shock)  Goal: Optimal Nutrition Intake  Outcome: Ongoing, Progressing     Problem: Respiratory Compromise (Sepsis/Septic Shock)  Goal: Effective Oxygenation and Ventilation  Outcome: Ongoing, Progressing     Problem: Electrolyte Imbalance (Acute Kidney Injury/Impairment)  Goal: Serum Electrolyte Balance  Outcome: Ongoing, Progressing     Problem: Fluid Imbalance (Acute Kidney Injury/Impairment)  Goal: Optimal Fluid Balance  Outcome: Ongoing, Progressing     Problem: Hematologic Alteration (Acute Kidney Injury/Impairment)  Goal: Hemoglobin, Hematocrit and Platelets Within Normal Range  Outcome: Ongoing, Progressing     Problem: Oral Intake Inadequate (Acute Kidney Injury/Impairment)  Goal: Optimal Nutrition Intake  Outcome:  Ongoing, Progressing     Problem: Renal Function Impairment (Acute Kidney Injury/Impairment)  Goal: Effective Renal Function  Outcome: Ongoing, Progressing     Problem: Wound  Goal: Optimal Wound Healing  Outcome: Ongoing, Progressing     Problem: Skin Injury Risk Increased  Goal: Skin Health and Integrity  Outcome: Ongoing, Progressing     Problem: Infection  Goal: Infection Symptom Resolution  Outcome: Ongoing, Progressing

## 2020-11-21 VITALS
HEIGHT: 77 IN | SYSTOLIC BLOOD PRESSURE: 114 MMHG | BODY MASS INDEX: 33.73 KG/M2 | TEMPERATURE: 97 F | HEART RATE: 63 BPM | OXYGEN SATURATION: 94 % | WEIGHT: 285.69 LBS | RESPIRATION RATE: 20 BRPM | DIASTOLIC BLOOD PRESSURE: 78 MMHG

## 2020-11-21 PROBLEM — I50.43 ACUTE ON CHRONIC COMBINED SYSTOLIC AND DIASTOLIC HEART FAILURE: Status: RESOLVED | Noted: 2020-09-30 | Resolved: 2020-11-21

## 2020-11-21 PROBLEM — E87.70 HYPERVOLEMIA: Status: RESOLVED | Noted: 2020-11-20 | Resolved: 2020-11-21

## 2020-11-21 LAB
ANION GAP SERPL CALC-SCNC: 8 MMOL/L (ref 8–16)
BASOPHILS # BLD AUTO: 0.05 K/UL (ref 0–0.2)
BASOPHILS NFR BLD: 0.8 % (ref 0–1.9)
BUN SERPL-MCNC: 13 MG/DL (ref 8–23)
CALCIUM SERPL-MCNC: 9 MG/DL (ref 8.7–10.5)
CHLORIDE SERPL-SCNC: 105 MMOL/L (ref 95–110)
CO2 SERPL-SCNC: 29 MMOL/L (ref 23–29)
CREAT SERPL-MCNC: 0.8 MG/DL (ref 0.5–1.4)
DIFFERENTIAL METHOD: ABNORMAL
EOSINOPHIL # BLD AUTO: 0.2 K/UL (ref 0–0.5)
EOSINOPHIL NFR BLD: 3.9 % (ref 0–8)
ERYTHROCYTE [DISTWIDTH] IN BLOOD BY AUTOMATED COUNT: 20 % (ref 11.5–14.5)
EST. GFR  (AFRICAN AMERICAN): >60 ML/MIN/1.73 M^2
EST. GFR  (NON AFRICAN AMERICAN): >60 ML/MIN/1.73 M^2
GLUCOSE SERPL-MCNC: 90 MG/DL (ref 70–110)
HCT VFR BLD AUTO: 40.5 % (ref 40–54)
HGB BLD-MCNC: 12.1 G/DL (ref 14–18)
IMM GRANULOCYTES # BLD AUTO: 0.01 K/UL (ref 0–0.04)
IMM GRANULOCYTES NFR BLD AUTO: 0.2 % (ref 0–0.5)
LYMPHOCYTES # BLD AUTO: 1.6 K/UL (ref 1–4.8)
LYMPHOCYTES NFR BLD: 25.8 % (ref 18–48)
MAGNESIUM SERPL-MCNC: 1.7 MG/DL (ref 1.6–2.6)
MCH RBC QN AUTO: 22.9 PG (ref 27–31)
MCHC RBC AUTO-ENTMCNC: 29.9 G/DL (ref 32–36)
MCV RBC AUTO: 77 FL (ref 82–98)
MONOCYTES # BLD AUTO: 0.5 K/UL (ref 0.3–1)
MONOCYTES NFR BLD: 8.1 % (ref 4–15)
NEUTROPHILS # BLD AUTO: 3.8 K/UL (ref 1.8–7.7)
NEUTROPHILS NFR BLD: 61.2 % (ref 38–73)
NRBC BLD-RTO: 0 /100 WBC
PHOSPHATE SERPL-MCNC: 3.4 MG/DL (ref 2.7–4.5)
PLATELET # BLD AUTO: 187 K/UL (ref 150–350)
PMV BLD AUTO: 10.8 FL (ref 9.2–12.9)
POTASSIUM SERPL-SCNC: 3.5 MMOL/L (ref 3.5–5.1)
RBC # BLD AUTO: 5.29 M/UL (ref 4.6–6.2)
SODIUM SERPL-SCNC: 142 MMOL/L (ref 136–145)
WBC # BLD AUTO: 6.17 K/UL (ref 3.9–12.7)

## 2020-11-21 PROCEDURE — 97161 PT EVAL LOW COMPLEX 20 MIN: CPT | Mod: HCNC

## 2020-11-21 PROCEDURE — 25000003 PHARM REV CODE 250: Mod: HCNC | Performed by: PHYSICIAN ASSISTANT

## 2020-11-21 PROCEDURE — 63600175 PHARM REV CODE 636 W HCPCS: Mod: HCNC | Performed by: PHYSICIAN ASSISTANT

## 2020-11-21 PROCEDURE — 36415 COLL VENOUS BLD VENIPUNCTURE: CPT | Mod: HCNC

## 2020-11-21 PROCEDURE — 99239 PR HOSPITAL DISCHARGE DAY,>30 MIN: ICD-10-PCS | Mod: HCNC,,, | Performed by: INTERNAL MEDICINE

## 2020-11-21 PROCEDURE — 84100 ASSAY OF PHOSPHORUS: CPT | Mod: HCNC

## 2020-11-21 PROCEDURE — 99239 HOSP IP/OBS DSCHRG MGMT >30: CPT | Mod: HCNC,,, | Performed by: INTERNAL MEDICINE

## 2020-11-21 PROCEDURE — 83735 ASSAY OF MAGNESIUM: CPT | Mod: HCNC

## 2020-11-21 PROCEDURE — 97165 OT EVAL LOW COMPLEX 30 MIN: CPT | Mod: HCNC

## 2020-11-21 PROCEDURE — 85025 COMPLETE CBC W/AUTO DIFF WBC: CPT | Mod: HCNC

## 2020-11-21 PROCEDURE — 80048 BASIC METABOLIC PNL TOTAL CA: CPT | Mod: HCNC

## 2020-11-21 PROCEDURE — 25000003 PHARM REV CODE 250: Mod: HCNC | Performed by: INTERNAL MEDICINE

## 2020-11-21 RX ORDER — POTASSIUM CHLORIDE 20 MEQ/1
40 TABLET, EXTENDED RELEASE ORAL ONCE
Status: COMPLETED | OUTPATIENT
Start: 2020-11-21 | End: 2020-11-21

## 2020-11-21 RX ORDER — POTASSIUM CHLORIDE 1500 MG/1
20 TABLET, EXTENDED RELEASE ORAL DAILY
Qty: 90 TABLET | Refills: 3 | Status: SHIPPED | OUTPATIENT
Start: 2020-11-21 | End: 2021-02-01 | Stop reason: SDUPTHER

## 2020-11-21 RX ORDER — FUROSEMIDE 80 MG/1
80 TABLET ORAL 2 TIMES DAILY
Qty: 180 TABLET | Refills: 3
Start: 2020-11-21 | End: 2020-12-16

## 2020-11-21 RX ORDER — ASPIRIN 325 MG
325 TABLET, DELAYED RELEASE (ENTERIC COATED) ORAL DAILY
Qty: 90 TABLET | Refills: 3 | Status: SHIPPED | OUTPATIENT
Start: 2020-11-21 | End: 2021-09-15 | Stop reason: SDUPTHER

## 2020-11-21 RX ORDER — IBUPROFEN 200 MG
200 TABLET ORAL NIGHTLY PRN
Qty: 30 TABLET | Refills: 3 | Status: SHIPPED | OUTPATIENT
Start: 2020-11-21 | End: 2020-12-31

## 2020-11-21 RX ADMIN — PRAVASTATIN SODIUM 80 MG: 40 TABLET ORAL at 08:11

## 2020-11-21 RX ADMIN — SPIRONOLACTONE 12.5 MG: 25 TABLET, FILM COATED ORAL at 08:11

## 2020-11-21 RX ADMIN — SACUBITRIL AND VALSARTAN 1 TABLET: 24; 26 TABLET, FILM COATED ORAL at 08:11

## 2020-11-21 RX ADMIN — WHITE PETROLATUM: 1.75 OINTMENT TOPICAL at 08:11

## 2020-11-21 RX ADMIN — GABAPENTIN 100 MG: 100 CAPSULE ORAL at 08:11

## 2020-11-21 RX ADMIN — METOPROLOL SUCCINATE 25 MG: 25 TABLET, EXTENDED RELEASE ORAL at 08:11

## 2020-11-21 RX ADMIN — POTASSIUM CHLORIDE 40 MEQ: 1500 TABLET, EXTENDED RELEASE ORAL at 08:11

## 2020-11-21 RX ADMIN — ASPIRIN 325 MG ORAL TABLET 325 MG: 325 PILL ORAL at 08:11

## 2020-11-21 RX ADMIN — FUROSEMIDE 80 MG: 10 INJECTION, SOLUTION INTRAMUSCULAR; INTRAVENOUS at 08:11

## 2020-11-21 RX ADMIN — POLYETHYLENE GLYCOL 3350 17 G: 17 POWDER, FOR SOLUTION ORAL at 08:11

## 2020-11-21 NOTE — PT/OT/SLP EVAL
Physical Therapy Evaluation  CoEval with OT     Patient Name:  Papito Bhakta   MRN:  [id number]    Co-treatment performed due to patient's multiple deficits requiring two skilled therapists to appropriately and safely assess patient's strength and endurance while facilitating functional tasks in addition to accommodating for patient's activity tolerance.   Recommendations:     Discharge Recommendations:  home   Discharge Equipment Recommendations: none   Barriers to discharge: None    Assessment:     Papito Bhakta is a 65 y.o. male admitted with a medical diagnosis of Acute on chronic combined systolic and diastolic heart failure.  He presents with the following impairments/functional limitations:  weakness, impaired endurance, impaired self care skills, impaired functional mobilty, gait instability, impaired balance . Patient tolerated session well. He reports that he is safe and confident to return home.  Patient will benefit from PT services to address the mentioned deficits in order to promote an improve functional mobility status. Upon d/c rec of home.    Rehab Prognosis: Good; patient would benefit from acute skilled PT services to address these deficits and reach maximum level of function.    Recent Surgery: * No surgery found *      Plan:     During this hospitalization, patient to be seen 3 x/week to address the identified rehab impairments via gait training, therapeutic activities, therapeutic exercises, neuromuscular re-education and progress toward the following goals:    · Plan of Care Expires:  12/21/20    History:     Past Medical History:   Diagnosis Date    RIGOBERTO (acute kidney injury) 10/7/2020    Anticoagulant long-term use     Aspirin    CHF (congestive heart failure)     Hyperlipidemia     Hypertension     NICM (nonischemic cardiomyopathy) 6/16/2020    Obesity        Past Surgical History:   Procedure Laterality Date    INSERTION OF BIVENTRICULAR IMPLANTABLE CARDIOVERTER-DEFIBRILLATOR (ICD)  N/A 2/13/2019    Procedure: INSERTION, ICD, BIVENTRICULAR;  Surgeon: Shailesh Eng MD;  Location: [facility] CATH LAB;  Service: Cardiology;  Laterality: N/A;  RN PRE OP 2-6-19  1ST CASE PER  RADHA. NOTIFIED RADHA THAT ANESTHESIA IS NOT PITO FOR 1ST CASE START-LO    INSERTION OF BIVENTRICULAR IMPLANTABLE CARDIOVERTER-DEFIBRILLATOR (ICD) N/A 9/28/2020    Procedure: INSERTION, ICD, BIVENTRICULAR;  Surgeon: Jim Kwong MD;  Location: [facility] EP LAB;  Service: Cardiology;  Laterality: N/A;  NICM, CRT-D, SJM,, MAC, DM, 3 Prep*Wearing LifeVest*    oral extraction  11/2018    TESTICLE SURGERY         Subjective     Chief Complaint: none   Patient/Family Comments/goals: to go home  Pain/Comfort:  · Pain Rating 1: 0/10  · Pain Rating Post-Intervention 1: 0/10    Patients cultural, spiritual, Confucianism conflicts given the current situation: no    Living Environment:  Patient's living environment is as follows:  Home type House    1 or 2 stories  [facility]   Number of SHIRA/ rails 0 SHIRA   AD used?/Owned?  RW, raised toilet, w/c, bath bench, grab bars, hip kit    Bathroom set-up  Tub/shower   Working? no   Driving? no   Individuals living with patient:  dtr and grandchildren   Hobbies/Roles: Babysitting his 6 yr old grandson   Prior to admission, patients level of function was (I) for ADLs and mostly (I) for mobility with prn use of RW.  Equipment used at home: walker, rolling, raised toilet, wheelchair, bath bench, grab bar, hip kit.  DME owned (not currently used): none.  Upon discharge, patient will have assistance from family.    Objective:     Communicated with RN prior to session.  Patient found sitting EOB  with telemetry, peripheral IV, friend catheter  upon PT entry to room. See below for detailed functional assessment. Patient agreeable to participate in initial evaluation.    General Precautions: Standard, fall   Orthopedic Precautions:N/A   Braces: N/A     Exams:  · Cognitive Exam:  Patient is oriented to Person, Place,  Time and Situation  · Postural Exam:  Patient presented with the following abnormalities:    · -       Rounded shoulders  · -       Forward head  · Sensation:    LEFT LE: -       Intact  light/touch LE RIGHT LE:   -       Intact  light/touch LE      ROM and Strength   Right Lower Extremity  Left Lower Extremity    Hip Flexion WFL WFL   Knee Extension  WFL WFL   Knee Flexion  WFL WFL   Ankle DF WFL WFL   Ankle PF  WFL WFL     Functional Mobility:  · Bed Mobility:  Patient sitting EOB upon arrival   · Transfers:     · Sit to Stand:  stand by assistance with no AD  · Gait: 30ft with CGA and HHA  ·  decreased roc, FFP  ·  initially step-to gait pattern which progressed with improved mechanics  ·  mildly unsteady, no LOB   · Balance: sitting EOB - (I); static standing - SBA; dynamic standing - CGA     Therapeutic Activities and Exercises:  -Patient educated on the role and goal of PT services during acute care LOS. Question and concerns acknowledged and answered as appropriate.   -Will continue to educated as needed.    -White board updated in patients room to reflect level of assistance needed with nursing. RNx1 - SBA-CGA     AM-PAC 6 CLICK MOBILITY  Total Score:21     Patient left seated EOB  with all lines intact, call button in reach and RN notified.  White board updated in patient room to reflect level of function with nursing.     GOALS:   Multidisciplinary Problems     Physical Therapy Goals        Problem: Physical Therapy Goal    Goal Priority Disciplines Outcome Goal Variances Interventions   Physical Therapy Goal     PT, PT/OT Ongoing, Progressing     Description: Goals to be met by: 20     Patient will increase functional independence with mobility by performin. Supine to sit with [place]  2. Sit to supine with [place]  3. Sit to stand transfer with Modified [place]  4. Gait  x 100 feet with Modified [place] using LRAD, if needed.                      Time Tracking:     PT  Received On: 11/21/20  PT Start Time: 1028     PT Stop Time: 1040  PT Total Time (min): 12 min     Billable Minutes: Evaluation 12min      Colleen Mancera, PT  11/21/2020

## 2020-11-21 NOTE — PROGRESS NOTES
[facility] Hospital Medicine  Progress Note    Patient Name: Papito Bhakta  MRN: [id number]  Patient Class: IP- Inpatient   Admission Date: 11/18/2020  Length of Stay: 0 days  Attending Physician: Brodie Galvan MD  Primary Care Provider: Brynn To MD    [facility] Medicine Team: [facility] [place] Brodie Galvan MD    HPI:     Papito Bhakta is a 65M with NICM with AICD and recent R CRT-D placement 9/27, hypertension, combined CHF (EF 18%, G3DD Sept 2020), hyperlipidemia, and obesity who presents from clinic for evaluation of weight gain and SOB. Patient with recent admission 09/28-10/15 for fluid overload, complicated by MICU admission for septic shock from serratia UTI, then discharged to rehab. He was started on entresto in the hospital and his BB was changed to MTP to allow better BP buffer. Apparently while in rehab his entresto was discontinued after being held for several days 2/2 borderline low BP and his lasix dose was decreased to support his BP better. His spironolactone was also discontinued. He was discharged from Rehab 10/29. Since being home he has gained 30#, dry weight around 270, today 301 in clinic with edema on CXR and . He was recommended to come into the ED for IV diuresis. The patient reports subjective dyspnea with minimal exertion, LE edema, and orthopnea. He reports compliance with meds, doubled up on his lasix. He admits he can do better with his diet. Denies cough, CP, fevers, urinary complaints.      Regarding his diuresis plan during last admission, he had several days of IV lasix pushes with moderate output eventually requiring lasix gtt. He required BiPAP that admission, today comfortable on RA.      ED: AFVSS, no oxygen requirements, , CXR with edema, no leukocytosis    Subjective:     Principal Problem:Acute on chronic combined systolic and diastolic heart failure    Interval History: Patient lying in bed, no acute distress. Reports SOB  improving and close to normal. Reports lower extremity swelling improving as well. Notes good urine output in response to diuretics.       Review of Systems   Constitutional: Positive for activity change. Negative for appetite change, chills and fever.   HENT: Negative for congestion.    Eyes: Negative for visual disturbance.   Respiratory: Positive for shortness of breath. Negative for cough.    Cardiovascular: Positive for leg swelling. Negative for chest pain.   Gastrointestinal: Negative for abdominal distention, abdominal pain, nausea and vomiting.   Genitourinary: Negative for dysuria.   Neurological: Positive for weakness. Negative for dizziness and headaches.   Psychiatric/Behavioral: Negative for confusion.        Objective:     Vital Signs (Most Recent):  Temp: 98 °F (36.7 °C) (11/20/20 2048)  Pulse: 86 (11/20/20 2050)  Resp: 18 (11/20/20 2048)  BP: 108/69 (11/20/20 2050)  SpO2: 96 % (11/20/20 2048) Vital Signs (24h Range):  Temp:  [96.3 °F (35.7 °C)-98.1 °F (36.7 °C)] 98 °F (36.7 °C)  Pulse:  [75-86] 86  Resp:  [16-28] 18  SpO2:  [91 %-98 %] 96 %  BP: ()/(53-81) 108/69     Weight: 132 kg (291 lb 0.1 oz)  Body mass index is 34.51 kg/m².    Intake/Output Summary (Last 24 hours) at 11/20/2020 2227  Last data filed at 11/20/2020 1700  Gross per 24 hour   Intake 1044 ml   Output 4525 ml   Net -3481 ml      Physical Exam  Constitutional:       Appearance: Normal appearance.   HENT:      Head: Normocephalic.      Mouth/Throat:      Mouth: Mucous membranes are moist.   Eyes:      Extraocular Movements: Extraocular movements intact.      Pupils: Pupils are equal, round, and reactive to light.   Neck:      Musculoskeletal: Normal range of motion.      Comments: JVD  Cardiovascular:      Rate and Rhythm: Normal rate and regular rhythm.      Pulses: Normal pulses.      Heart sounds: Normal heart sounds.   Pulmonary:      Effort: Pulmonary effort is normal.      Breath sounds: Normal breath sounds.   Abdominal:       General: There is no distension.      Tenderness: There is no abdominal tenderness.   Musculoskeletal: Normal range of motion.      Right lower leg: Edema present.      Left lower leg: Edema present.   Skin:     General: Skin is warm.   Neurological:      General: No focal deficit present.      Mental Status: He is alert.   Psychiatric:         Mood and Affect: Mood normal.           Significant Labs:   A1C:   Recent Labs   Lab 11/19/20 0300   HGBA1C 6.2*     CBC:   Recent Labs   Lab 11/19/20 0300 11/20/20  0243   WBC 5.27 6.13   HGB 10.3* 11.0*   HCT 34.8* 37.3*    166     CMP:   Recent Labs   Lab 11/19/20 0300 11/20/20  0243    142   K 3.1* 3.2*    103   CO2 31* 29   GLU 89 84   BUN 16 14   CREATININE 1.1 0.9   CALCIUM 8.7 8.8   ANIONGAP 8 10   EGFRNONAA >60.0 >60.0     Coagulation: No results for input(s): PT, INR, APTT in the last 48 hours.  Magnesium:   Recent Labs   Lab 11/19/20 0300 11/20/20  0243   MG 1.8 1.7     POCT Glucose: No results for input(s): POCTGLUCOSE in the last 48 hours.  Troponin: No results for input(s): TROPONINI in the last 48 hours.  TSH:   Recent Labs   Lab 10/07/20  0409   TSH 1.206     Urine Studies: No results for input(s): COLORU, APPEARANCEUA, PHUR, SPECGRAV, PROTEINUA, GLUCUA, KETONESU, BILIRUBINUA, OCCULTUA, NITRITE, UROBILINOGEN, LEUKOCYTESUR, RBCUA, WBCUA, BACTERIA, SQUAMEPITHEL, HYALINECASTS in the last 48 hours.    Invalid input(s): WRIGHTSUR    Significant Imaging: I have reviewed and interpreted all pertinent imaging results/findings within the past 24 hours.    Assessment/Plan:      Active Diagnoses:    Diagnosis Date Noted POA    PRINCIPAL PROBLEM:  Acute on chronic combined systolic and diastolic heart failure [I50.43] 09/30/2020 Yes    Hypervolemia [E87.70] 11/20/2020 Yes    Biventricular ICD (implantable cardioverter-defibrillator) in place [Z95.810] 05/14/2019 Yes    Severe obesity (BMI 35.0-39.9) with comorbidity [E66.01] 05/01/2019 Yes     Essential hypertension [I10] 12/01/2017 Yes     Chronic      Problems Resolved During this Admission:     Scheduled Meds:   aspirin  325 mg Oral Daily    enoxaparin  40 mg Subcutaneous Q24H    furosemide (LASIX) IV  80 mg Intravenous BID    gabapentin  100 mg Oral TID    metoprolol succinate  25 mg Oral Daily    polyethylene glycol  17 g Oral Daily    pravastatin  80 mg Oral Daily    sacubitriL-valsartan  1 tablet Oral BID    spironolactone  12.5 mg Oral Daily    white petrolatum   Topical (Top) BID     Continuous Infusions:  PRN Meds:.acetaminophen, acetaminophen, albuterol-ipratropium, bisacodyL, dextrose 50%, dextrose 50%, glucagon (human recombinant), glucose, glucose, melatonin, ondansetron, promethazine (PHENERGAN) IVPB, sodium chloride 0.9%    PLAN:    Acute on chronic combined systolic and diastolic heart failure- improving   - subjective SOB with gross volume overload, no oxygen requirements, 30# weight gain ()  - did well with toprol 25 daily, entresto 24/26, aldactone 12.5 mg while at hospital, then discharged to rehab  - while at rehab entresto discontinued, spironolactone discontinued, and lasix decreased 2/2 hypotension  - prior to above changes, was on carvedilol 12.5 BID, ramipiril, aldactone  - start lasix 80 mv IVP BID (was on lasix gtt last admission, but required bipap at that time). Plan to switch to oral lasix tomorrow   - restart entresto 24/26, hold for SBP < 110  - continue MTP XL 25 mg daily  - continue to hold spironolactone pending BP trend  - strict I/Os, daily weights, fluid restrict, low salt diet      Biventricular ICD (implantable cardioverter-defibrillator) in place  - stable     Severe obesity (BMI 35.0-39.9) with comorbidity  - obesity complicates all aspects of disease management from diagnostic modalities to treatment. Weight loss encouraged and health benefits explained to patient.        Essential hypertension  - continue home antihypertensives with  holding parameters     Debility  - PT/OT ordered       VTE Risk Mitigation (From admission, onward)         Ordered     enoxaparin injection 40 mg  Every 24 hours      11/19/20 0032     IP VTE HIGH RISK PATIENT  Once      11/19/20 0032              Time spent in care of patient: > 35 minutes       Brodie Galvan MD  Department of Hospital Medicine   [facility]

## 2020-11-21 NOTE — PLAN OF CARE
Problem: Physical Therapy Goal  Goal: Physical Therapy Goal  Description: Goals to be met by: 20     Patient will increase functional independence with mobility by performin. Supine to sit with Branch  2. Sit to supine with Branch  3. Sit to stand transfer with Modified Branch  4. Gait  x 100 feet with Modified Branch using LRAD, if needed.     Outcome: Ongoing, Progressing  Initial evaluation completed. Patient tolerated assessment well. Established POC and goals. Patient would continue to benefit from skilled PT services in order to improve functional mobility independence.     Colleen Mancera, PT, DPT  2020

## 2020-11-21 NOTE — DISCHARGE SUMMARY
[facility] Hospital Medicine  Discharge Summary      Patient Name: Papito Bhakta  MRN: [id number]  Admission Date: 11/18/2020  Hospital Length of Stay: 1 days  Discharge Date and Time: 11/21/2020 12:12 PM  Attending Physician: Brodie Galvan MD   Discharging Provider: Brodie Galvan MD  Primary Care Provider: Brynn To MD    Hospital Medicine Team: [facility] MED A Brodie Galvan MD    HPI:     Papito Bhakta is a 65M with NICM with AICD and recent R CRT-D placement 9/27, hypertension, combined CHF (EF 18%, G3DD Sept 2020), hyperlipidemia, and obesity who presents from clinic for evaluation of weight gain and SOB. Patient with recent admission 09/28-10/15 for fluid overload, complicated by MICU admission for septic shock from serratia UTI, then discharged to rehab. He was started on entresto in the hospital and his BB was changed to MTP to allow better BP buffer. Apparently while in rehab his entresto was discontinued after being held for several days 2/2 borderline low BP and his lasix dose was decreased to support his BP better. His spironolactone was also discontinued. He was discharged from Rehab 10/29. Since being home he has gained 30#, dry weight around 270, today 301 in clinic with edema on CXR and . He was recommended to come into the ED for IV diuresis. The patient reports subjective dyspnea with minimal exertion, LE edema, and orthopnea. He reports compliance with meds, doubled up on his lasix. He admits he can do better with his diet. Denies cough, CP, fevers, urinary complaints.      Regarding his diuresis plan during last admission, he had several days of IV lasix pushes with moderate output eventually requiring lasix gtt. He required BiPAP that admission, today comfortable on RA.      ED: AFVSS, no oxygen requirements, , CXR with edema, no leukocytosis    * No surgery found *      Hospital Course:     Acute on chronic combined systolic and diastolic heart  failure- improving    CRT-D placement   - Echo (9/30) showed EF 18%, grade III diastolic dysfunction, moderate MR, mild to moderate TR, pulmonary hypertension  - subjective SOB with gross volume overload, no oxygen requirements, 30# weight gain ()  - did well with toprol 25 daily, entresto 24/26, aldactone 12.5 mg while at hospital, then discharged to rehab  - while at rehab entresto discontinued, spironolactone discontinued, and lasix decreased 2/2 hypotension  - prior to above changes, was on carvedilol 12.5 BID, ramipiril, aldactone  - start lasix 80 mv IVP BID (was on lasix gtt last admission, but required bipap at that time). Transitioned to oral lasix on 11/21 upon discharge   - continue entresto 24/26, hold for SBP < 110  - continue MTP XL 25 mg daily  - resumed spironolactone  - strict I/Os, daily weights, fluid restrict, low salt diet  - outpatient cardiology followup  - net negative 11.2 L since admission   - weight on admission 301 lbs. Weight upon discharge 285 pounds         Biventricular ICD (implantable cardioverter-defibrillator) in place  - stable     Severe obesity (BMI 35.0-39.9) with comorbidity  - obesity complicates all aspects of disease management from diagnostic modalities to treatment. Weight loss encouraged and health benefits explained to patient.        Essential hypertension  - continue home antihypertensives with holding parameters      Debility  - PT/OT     Consults:     Final Active Diagnoses:    Diagnosis Date Noted POA    PRINCIPAL PROBLEM:  Acute on chronic combined systolic and diastolic heart failure [I50.43] 09/30/2020 Yes    Hypervolemia [E87.70] 11/20/2020 Yes    Biventricular ICD (implantable cardioverter-defibrillator) in place [Z95.810] 05/14/2019 Yes    Severe obesity (BMI 35.0-39.9) with comorbidity [E66.01] 05/01/2019 Yes    Essential hypertension [I10] 12/01/2017 Yes     Chronic      Problems Resolved During this Admission:      Discharged Condition:  good    Disposition: Home or Self Care    Follow Up:  PCP within 1 week  Cardiology in 1-2 weeks    Patient Instructions:   No discharge procedures on file.  Medications:  Reconciled Home Medications:      Medication List      START taking these medications    potassium chloride 20 mEq  Commonly known as: K-TAB  Take 1 tablet (20 mEq total) by mouth once daily.        CHANGE how you take these medications    furosemide 80 MG tablet  Commonly known as: LASIX  Take 1 tablet (80 mg total) by mouth 2 (two) times a day. If weight increases by 5 pounds in 3 days or 3 lbs in 1 day, take 2 pills daily until weight decreases to baseline. If weight is stable, then take only 1 tablet daily.  What changed: how much to take        CONTINUE taking these medications    aspirin 325 MG tablet  Take 1 tablet (325 mg total) by mouth once daily.     gabapentin 100 MG capsule  Commonly known as: NEURONTIN  Take 100 mg by mouth 3 (three) times daily.     ibuprofen 200 MG tablet  Commonly known as: ADVIL,MOTRIN  Take 1 tablet (200 mg total) by mouth nightly as needed for Pain.     metoprolol succinate 25 MG 24 hr tablet  Commonly known as: TOPROL-XL  Take 1 tablet (25 mg total) by mouth once daily.     pravastatin 80 MG tablet  Commonly known as: PRAVACHOL  Take 1 tablet (80 mg total) by mouth once daily.     sacubitriL-valsartan 24-26 mg per tablet  Commonly known as: ENTRESTO  Take 1 tablet by mouth 2 (two) times daily. Hold if SBP < 110     spironolactone 25 MG tablet  Commonly known as: ALDACTONE  Take 0.5 tablets (12.5 mg total) by mouth once daily. Hold if SBP < 110        STOP taking these medications    FLUZONE HIGHDOSE QUAD 20-21  mcg/0.7 mL Syrg  Generic drug: flu vacc [id number](65yr up)-PF     ramipriL 10 MG capsule  Commonly known as: ALTACE            Significant Diagnostic Studies: Labs:   [facility]   Recent Labs   Lab 11/20/20  0243      K 3.2*      CO2 29   GLU 84   BUN 14   CREATININE 0.9   CALCIUM 8.8    ANIONGAP 10   ESTGFRAFRICA >60.0   EGFRNONAA >60.0   , CBC   Recent Labs   Lab 11/20/20  0243   WBC 6.13   HGB 11.0*   HCT 37.3*      , INR   Lab Results   Component Value Date    INR 1.2 09/25/2020    INR 1.1 06/17/2020    INR 1.1 06/16/2020   , Troponin No results for input(s): TROPONINI in the last 168 hours. and A1C:   Recent Labs   Lab 11/19/20  0300   HGBA1C 6.2*       Pending Diagnostic Studies:     Procedure Component Value Units Date/Time    Basic Metabolic Panel (BMP) [[id number]] Collected: 11/21/20 0622    Order Status: Sent Lab Status: In process Updated: 11/21/20 0643    Specimen: Blood     CBC with Automated Differential [[id number]] Collected: 11/21/20 0622    Order Status: Sent Lab Status: In process Updated: 11/21/20 0643    Specimen: Blood     Magnesium [[id number]] Collected: 11/21/20 0622    Order Status: Sent Lab Status: In process Updated: 11/21/20 0643    Specimen: Blood     Phosphorus [[id number]] Collected: 11/21/20 0622    Order Status: Sent Lab Status: In process Updated: 11/21/20 0643    Specimen: Blood         Echo (9/30):    · The left ventricle is severely enlarged with severely decreased systolic function. The estimated ejection fraction is 18%.  · There are segmental left ventricular wall motion abnormalities.  · There is moderate left ventricular eccentric hypertrophy.  · Grade III diastolic dysfunction.  · Severe left atrial enlargement.  · Moderate right ventricular enlargement.  · Low normal right ventricular systolic function.  · Severe right atrial enlargement.  · Moderate mitral regurgitation.  · Mild to moderate tricuspid regurgitation.  · There is pulmonary hypertension at least in the 50s.    Indwelling Lines/Drains at time of discharge:   Lines/Drains/Airways     None                 Time spent on the discharge of patient: 45 minutes  Patient was seen and examined on the date of discharge and determined to be suitable for discharge.         Brodie Galvan,  MD  Department of Hospital Medicine  [facility]

## 2020-11-21 NOTE — PLAN OF CARE
Patient discharged to home. IV removed,discharge instructions given. Patient verbalized his understanding.  Patient has taken all personal belongings. Patient escorted to car in a wheelchair.

## 2020-11-21 NOTE — PLAN OF CARE
[facility]    HOME HEALTH ORDERS  FACE TO FACE ENCOUNTER    Patient Name: Papito Bhakta  YOB: 1955    PCP: Brynn To MD   PCP Address: [address] / [place] [address]  PCP Phone Number: [phone]  PCP Fax: [phone]    Encounter Date: 11/21/2020    Admit to Home Health    Diagnoses:  Active Hospital Problems    Diagnosis  POA    Biventricular ICD (implantable cardioverter-defibrillator) in place [Z95.810]  Yes    Severe obesity (BMI 35.0-39.9) with comorbidity [E66.01]  Yes    Essential hypertension [I10]  Yes     Chronic      Resolved Hospital Problems    Diagnosis Date Resolved POA    *Acute on chronic combined systolic and diastolic heart failure [I50.43] 11/21/2020 Yes    Hypervolemia [E87.70] 11/21/2020 Yes       Future Appointments   Date Time Provider Department Center   12/26/2020 10:00 AM HOME MONITOR DEVICE CHECK, [facility] IAN Andres [facility]   12/31/2020 10:20 AM Brynn To MD [facility] MED [place]   2/15/2021  9:15 AM EKG, APPT [facility] EKG [facility]   2/15/2021  9:40 AM COORDINATED DEVICE CHECK [facility] IAN Andres [facility]   2/15/2021 10:30 AM Juliana Mckenzie NP [facility]           I have seen and examined this patient face to face today. My clinical findings that support the need for the home health skilled services and home bound status are the following:  Weakness/numbness causing balance and gait disturbance due to Heart Failure and Weakness/Debility making it taxing to leave home.    Allergies:Review of patient's allergies indicates:  No Known Allergies    Diet: fluid restriction: 1.5 L  and 2 gram sodium diet    Activities: activity as tolerated    Nursing:   SN to complete comprehensive assessment including routine vital signs. Instruct on disease process and s/s of complications to report to MD. Review/verify medication list sent home with the patient at time of discharge  and instruct patient/caregiver as needed. Frequency may be  adjusted depending on start of care date.    Notify MD if SBP > 160 or < 90; DBP > 90 or < 50; HR > 120 or < 50; Temp > 101      CONSULTS:    Physical Therapy to evaluate and treat. Evaluate for home safety and equipment needs; Establish/upgrade home exercise program. Perform / instruct on therapeutic exercises, gait training, transfer training, and Range of Motion.  Occupational Therapy to evaluate and treat. Evaluate home environment for safety and equipment needs. Perform/Instruct on transfers, ADL training, ROM, and therapeutic exercises.    MISCELLANEOUS CARE:  Heart Failure:      SN to instruct on the following:    Instruct on the definition of CHF.   Instruct on the signs/sympoms of CHF to be reported.   Instruct on and monitor daily weights.   Instruct on factors that cause exacerbation.   Instruct on action, dose, schedule, and side effects of medications.   Instruct on diet as prescribed.   Instruct on activity allowed.   Instruct on life-style modifications for life long management of CHF   SN to assess compliance with daily weights, diet, medications, fluid retention,    safety precautions, activities permitted and life-style modifications.   Additional 1-2 SN visits per week as needed for signs and symptoms     of CHF exacerbation.      For Weight Gain > 2-3 lbs in 1 day or 4-6 lbs over 1 week notify PCP    WOUND CARE ORDERS  n/a      Medications: Review discharge medications with patient and family and provide education.      Current Discharge Medication List      START taking these medications    Details   potassium chloride (K-TAB) 20 mEq Take 1 tablet (20 mEq total) by mouth once daily.  Qty: 90 tablet, Refills: 3         CONTINUE these medications which have CHANGED    Details   aspirin 325 MG tablet Take 1 tablet (325 mg total) by mouth once daily.  Qty: 90 tablet, Refills: 3      furosemide (LASIX) 80 MG tablet Take 1 tablet (80 mg total) by mouth 2 (two) times a day. If weight increases by 5  pounds in 3 days or 3 lbs in 1 day, take 2 pills daily until weight decreases to baseline. If weight is stable, then take only 1 tablet daily.  Qty: 180 tablet, Refills: 3    Associated Diagnoses: Chronic combined systolic and diastolic congestive heart failure      ibuprofen (ADVIL,MOTRIN) 200 MG tablet Take 1 tablet (200 mg total) by mouth nightly as needed for Pain.  Qty: 30 tablet, Refills: 3         CONTINUE these medications which have NOT CHANGED    Details   gabapentin (NEURONTIN) 100 MG capsule Take 100 mg by mouth 3 (three) times daily.      metoprolol succinate (TOPROL-XL) 25 MG 24 hr tablet Take 1 tablet (25 mg total) by mouth once daily.  Qty: 30 tablet, Refills: 11    Comments: .      pravastatin (PRAVACHOL) 80 MG tablet Take 1 tablet (80 mg total) by mouth once daily.  Qty: 90 tablet, Refills: 3    Associated Diagnoses: Other hyperlipidemia      sacubitriL-valsartan (ENTRESTO) 24-26 mg per tablet Take 1 tablet by mouth 2 (two) times daily. Hold if SBP < 110  Qty: 60 tablet, Refills: 0      spironolactone (ALDACTONE) 25 MG tablet Take 0.5 tablets (12.5 mg total) by mouth once daily. Hold if SBP < 110  Qty: 90 tablet, Refills: 3    Comments: .         STOP taking these medications       FLUZONE HIGHDOSE QUAD 20-21  mcg/0.7 mL Syrg Comments:   Reason for Stopping:         ramipril (ALTACE) 10 MG capsule Comments:   Reason for Stopping:               I certify that this patient is confined to his home and needs intermittent skilled nursing care, physical therapy and occupational therapy.

## 2020-11-21 NOTE — PLAN OF CARE
Problem: Fall Injury Risk  Goal: Absence of Fall and Fall-Related Injury  Outcome: Ongoing, Progressing     Problem: Adult Inpatient Plan of Care  Goal: Plan of Care Review  Outcome: Ongoing, Progressing     Problem: Hemodynamic Instability (Sepsis/Septic Shock)  Goal: Effective Tissue Perfusion  Outcome: Ongoing, Progressing     Problem: Infection  Goal: Infection Symptom Resolution  Outcome: Ongoing, Progressing

## 2020-11-21 NOTE — PT/OT/SLP EVAL
"Occupational Therapy   Evaluation    Name: Papito Bhakta  MRN: [id number]  Admitting Diagnosis:  Acute on chronic combined systolic and diastolic heart failure      Recommendations:     Discharge Recommendations: home health OT  Discharge Equipment Recommendations:  none  Barriers to discharge:  None    Assessment:     Papito Bhakta is a 65 y.o. male with a medical diagnosis of Acute on chronic combined systolic and diastolic heart failure.  He presents with impaired ADL and mobility performance deficits. Pt participated fairly in evaluation this date found sitting at EOB. Pt explains he was recently at [facility] then returned home and has been doing well in relation to mobility. He is (I) with ADLs and mobilizes in his home with a RW. During evaluation, pt required CGA-HHA to perform bedroom mobility. Pt would benefit from continued OT skilled services 3x/wk to improve daily living skills to optimize QOL. Pt is recommended to discharge to OT at this time.Performance deficits affecting function: weakness, impaired functional mobilty, impaired self care skills, impaired endurance, gait instability, impaired balance.      Rehab Prognosis: Good; patient would benefit from acute skilled OT services to address these deficits and reach maximum level of function.       Plan:     Patient to be seen 3 x/week to address the above listed problems via self-care/home management, therapeutic activities, therapeutic exercises  · Plan of Care Expires: 12/21/20  · Plan of Care Reviewed with: patient    Subjective     Chief Complaint: none stated   Patient/Family Comments/goals: "They were waiting on you guys for the paperwork so I can go home!"    Occupational Profile:  Living Environment: pt lives with his daughter and grandchildren in a [facility] with no SHIRA.  Previous level of function: I with ADLs and mod (I) using RW for mobility   Roles and Routines: Enjoys babysitting his 6 yr old grandson   Equipment Used at Home:  walker, " rolling, raised toilet, wheelchair, hip kit, bath bench, grab bar  Assistance upon Discharge: Family    Pain/Comfort:  · Pain Rating 1: 0/10  · Pain Rating 2: 0/10    Patients cultural, spiritual, Islam conflicts given the current situation: no    Objective:     Communicated with: RN prior to session.  Patient found at EOB with Condom Catheter upon OT entry to room.    General Precautions: Standard, fall   Orthopedic Precautions:N/A   Braces: N/A     Occupational Performance:    Bed Mobility:    · Patient completed Scooting/Bridging with stand by assistance    Functional Mobility/Transfers:  · Patient completed Sit <> Stand Transfer with contact guard assistance  with  hand-held assist   · Functional Mobility: Pt completed bedroom mobility with CGA-HHA    Activities of Daily Living:  · Upper Body Dressing: minimum assistance adjusting gown fit at EOB    Cognitive/Visual Perceptual:  Cognitive/Psychosocial Skills:     -       Oriented to: Person, Place, Time and Situation   -       Follows Commands/attention:Follows multistep  commands  -       Communication: clear/fluent  -       Memory: No Deficits noted  -       Safety awareness/insight to disability: intact   -       Mood/Affect/Coping skills/emotional control: Appropriate to situation    Physical Exam:  Balance:    -       demo good sitting balance; fair standing balance with HHA  Upper Extremity Range of Motion:     -       Right Upper Extremity: WNL  -       Left Upper Extremity: WNL  Upper Extremity Strength:    -       Right Upper Extremity: WNL  -       Left Upper Extremity: WNL   Strength:    -       Right Upper Extremity: WNL  -       Left Upper Extremity: WNL    AMPAC 6 Click ADL:  AMPAC Total Score: 20    Treatment & Education:  Pt educated on role of occupational therapy, POC, and safety during ADLs and functional mobility. Pt and OT discussed importance of safe, continued mobility to optimize daily living skills. Pt verbalized understanding.  White board updated during session. Pt given instruction to call for medical staff/nurse for assistance.     Education:    Patient left Sitting at EOB  with all lines intact, call button in reach and RN notified    GOALS:   Multidisciplinary Problems     Occupational Therapy Goals        Problem: Occupational Therapy Goal    Goal Priority Disciplines Outcome Interventions   Occupational Therapy Goal     OT, PT/OT Ongoing, Progressing                    History:     Past Medical History:   Diagnosis Date    RIGOBERTO (acute kidney injury) 10/7/2020    Anticoagulant long-term use     Aspirin    CHF (congestive heart failure)     Hyperlipidemia     Hypertension     NICM (nonischemic cardiomyopathy) 6/16/2020    Obesity        Past Surgical History:   Procedure Laterality Date    INSERTION OF BIVENTRICULAR IMPLANTABLE CARDIOVERTER-DEFIBRILLATOR (ICD) N/A 2/13/2019    Procedure: INSERTION, ICD, BIVENTRICULAR;  Surgeon: Shailesh Eng MD;  Location: [facility] CATH LAB;  Service: Cardiology;  Laterality: N/A;  RN PRE OP 2-6-19  1ST CASE PER  RADHA. NOTIFIED RADHA THAT ANESTHESIA IS NOT PITO FOR 1ST CASE START-LO    INSERTION OF BIVENTRICULAR IMPLANTABLE CARDIOVERTER-DEFIBRILLATOR (ICD) N/A 9/28/2020    Procedure: INSERTION, ICD, BIVENTRICULAR;  Surgeon: Jim Kwong MD;  Location: [facility] EP LAB;  Service: Cardiology;  Laterality: N/A;  NICM, CRT-D, SJM,, MAC, DM, 3 Prep*Wearing LifeVest*    oral extraction  11/2018    TESTICLE SURGERY         Time Tracking:     OT Date of Treatment: 11/21/20  OT Start Time: 1028  OT Stop Time: 1039  OT Total Time (min): 11 min    Billable Minutes:Evaluation 11 min    Carmen Jaimes OT  11/21/2020

## 2020-11-21 NOTE — PLAN OF CARE
No acute OT needs.  Carmen Jaimes, OT  11/21/2020    Problem: Occupational Therapy Goal  Goal: Occupational Therapy Goal  Outcome: Ongoing, Progressing

## 2020-12-11 NOTE — PROGRESS NOTES
[facility]  Pulmonology  Progress Note    Patient Name: Papito Bhakta  MRN: [id number]  Admission Date: 1/5/2019  Hospital Length of Stay: 4 days  Code Status: Full Code  Attending Provider: Jayden Gray MD  Primary Care Provider: Brynn To MD   Principal Problem: Acute respiratory failure with hypoxia    Subjective:     HPI:  Mr. Bhakta is a 64 yo male with HFrEF (EF 20%) and HTN who presented to the ED on 1/5/19 complaining of gradually worsening SOB with associated bilateral lower extremity swelling x1 week and productive cough. He is not on supplementary O2 at home. He reported compliance with his lasix 80mg x2/day. He denied fever, chills, diaphoresis, nausea, emesis, diarrhea, abdominal pain, chest pain. Patient was hypoxic upon arrival to 80%. He was started on BiPAP and diuresis as his CXR noted significant pulmonary edema.     Hospital Course:  Mr. Bhakta was admitted for respiratory failure thought to be due to CHF exacerbation. He was started on BiPAP and diuresed. He had no leukocytosis or fever at the time of presentation. He had worsening respiratory failure the night of 1/5 and required intubation. Upon intubation he was found to have a copious amount of thick, yellow sputum, almost appearing purulent. He had been started on broad spectrum antibiotics with cefepime and vancomycin. Respiratory culture grew gram positive cocci in pairs and chains and GNR's. Shock also developed on the night of 1/5, suspected to be cardiogenic vs septic, so a central line was placed and levophed started. He developed a fever on 1/6 (102 F), antibiotics continued. Shock resolved on 1/8 and he has failed SBT's due to tachypnea, high work of breathing despite diuresis since 1/7. Cardiology consulted on 1/8 to help with optimization of HF regimen as repeat 2D echo noted worsening of EF to 10%.  Dobutamine 2 mcg/kg/min stated on 1/8 and diuresis continued.     Interval History/Significant  Events: 2L urine output over past day, however +400 ml/day (some is enteral feeding). Alert, without complaints today with no sedation. Will plan for SBT. Afebrile.    Objective:     Vital Signs (Most Recent):  Temp: 98.8 °F (37.1 °C) (01/09/19 0730)  Pulse: 73 (01/09/19 0930)  Resp: 18 (01/09/19 0930)  BP: (!) 99/56 (01/09/19 0800)  SpO2: 97 % (01/09/19 0930) Vital Signs (24h Range):  Temp:  [98.8 °F (37.1 °C)-99.7 °F (37.6 °C)] 98.8 °F (37.1 °C)  Pulse:  [65-96] 73  Resp:  [16-34] 18  SpO2:  [91 %-100 %] 97 %  BP: ()/(50-79) 99/56     Weight: (!) 141.1 kg (311 lb)  Body mass index is 36.88 kg/m².      Intake/Output Summary (Last 24 hours) at 1/9/2019 0951  Last data filed at 1/9/2019 0730  Gross per 24 hour   Intake 1982.53 ml   Output 1720 ml   Net 262.53 ml       Physical Exam   Constitutional: He appears well-developed. He has a sickly appearance. He appears ill. No distress. He is intubated.   HENT:   Head: Normocephalic and atraumatic.   Eyes: Conjunctivae are normal. Pupils are equal, round, and reactive to light. Right eye exhibits no discharge. Left eye exhibits no discharge. No scleral icterus.   Neck: Normal range of motion. Neck supple. JVD: unable to assess; TLC in place. No tracheal deviation present. No thyromegaly present.   Cardiovascular: Normal rate, regular rhythm, S1 normal and S2 normal.   Pulses:       Radial pulses are 2+ on the right side, and 2+ on the left side.        Dorsalis pedis pulses are 1+ on the right side, and 1+ on the left side.   LE's warm today. 1+ DP pulses palpated bilaterally    Pulmonary/Chest: No accessory muscle usage. He is intubated. No respiratory distress. He has no decreased breath sounds. He has no wheezes. He has rales in the right upper field, the right middle field, the left upper field and the left middle field.   Abdominal: Soft. Bowel sounds are normal. He exhibits no distension. There is no tenderness. There is no guarding.   Lymphadenopathy:     He  has no cervical adenopathy.   Neurological: GCS eye subscore is 4. GCS verbal subscore is 1. GCS motor subscore is 6.   RASS 0, alert, interactive, able to follow commands consistently. Moves all extremities when prompted with good strength.    Skin: Skin is dry. He is not diaphoretic. There is pallor.       Vents:  Vent Mode: PRVC (01/09/19 0930)  Ventilator Initiated: Yes (01/05/19 1945)  Set Rate: 16 bmp (01/09/19 0930)  Vt Set: 540 mL (01/09/19 0930)  Pressure Support: 10 cmH20 (01/07/19 0906)  PEEP/CPAP: 8 cmH20 (01/09/19 0930)  Oxygen Concentration (%): 35 (01/09/19 0930)  Peak Airway Pressure: 30.1 cmH2O (01/09/19 0930)  Total Ve: 9.7 mL (01/09/19 0930)  F/VT Ratio<105 (RSBI): (!) 31.36 (01/09/19 0930)    Lines/Drains/Airways     Central Venous Catheter Line                 Tunneled Central Line Insertion/Assessment - Triple Lumen  01/05/19 2130 right internal jugular 3 days          Drain                 NG/OG Tube 01/05/19 2200 orogastric 12 Fr. Left mouth 3 days         Urethral Catheter 01/05/19 1745 3 days          Airway                 Airway - Non-Surgical 01/05/19 1945 Endotracheal Tube 3 days                Significant Labs:    CBC/Anemia Profile:  Recent Labs   Lab 01/08/19  0334 01/09/19  0409   WBC 8.37 8.71   HGB 11.4* 11.2*   HCT 32.6* 31.9*    211   MCV 81* 85   RDW 14.9* 16.2*        Chemistries:  Recent Labs   Lab 01/08/19  0334 01/08/19  1144 01/09/19  0409    137 138   K 3.4* 4.1 3.5   CL 98 99 98   CO2 31* 29 30*   BUN 12 14 21   CREATININE 0.9 1.1 1.4   CALCIUM 8.7 8.6* 8.7   MG 1.8 2.1 1.9   PHOS  --   --  4.2       Significant Imaging:  I have reviewed all pertinent imaging results/findings within the past 24 hours.    Assessment/Plan:     * Acute respiratory failure with hypoxia    --2/2 cardiogenic pulmonary edema and concurrent pneumonia. Unclear which is leading cause of failure to wean from vent. CXR has not changed much with diuresis, however patient is not  significantly net negative this admission. Additionally, patient is still having significant, thick secretions which point towards pneumonia.   --appreciate cardiology assistance in optimization of HF regimen.  started on 1/8 at 2 mcg/kg/min   --slight increase in creatinine with diuresis over past 2 days. Will discuss further about diuretics    --de-escalate to cefepime monotherapy with planned 7 day course (tentative stop date 1/12)  --continue daily SBT's. Failed today on 5/5 with accessory muscle use, tachypnea, small volumes. Able to tolerate 10/5 for 30 mintues     Acute on chronic combined systolic and diastolic congestive heart failure    --appreciate cardiology assistance  --continue  per cardiology. Discuss plans for diuretics        Encephalopathy, metabolic    --2/2 sedation.   --continue low dose fentanyl gtt for pain/sedation. Titrate to RASS -1  --with SAT, patient awakens easily, follows commands and appears comfortable.         Discussed with Dr. Vazquez.    Critical care time: 50 minutes  Carly Reynolds NP  Pulmonology  [facility] [place]     Consent was obtained from the patient. The risks and benefits to therapy were discussed in detail. Specifically, the risks of infection, scarring, bleeding, prolonged wound healing, incomplete removal, allergy to anesthesia, nerve injury and recurrence were addressed. Prior to the procedure, the treatment site was clearly identified and confirmed by the patient. All components of Universal Protocol/PAUSE Rule completed.

## 2020-12-16 DIAGNOSIS — I50.42 CHRONIC COMBINED SYSTOLIC AND DIASTOLIC CONGESTIVE HEART FAILURE: ICD-10-CM

## 2020-12-16 RX ORDER — FUROSEMIDE 80 MG/1
TABLET ORAL
Qty: 90 TABLET | Refills: 1 | Status: SHIPPED | OUTPATIENT
Start: 2020-12-16 | End: 2021-02-01

## 2020-12-26 ENCOUNTER — CLINICAL SUPPORT (OUTPATIENT)
Dept: CARDIOLOGY | Facility: HOSPITAL | Age: 65
End: 2020-12-26
Payer: MEDICARE

## 2020-12-26 DIAGNOSIS — I50.42 CHRONIC COMBINED SYSTOLIC (CONGESTIVE) AND DIASTOLIC (CONGESTIVE) HEART FAILURE: ICD-10-CM

## 2020-12-26 DIAGNOSIS — I42.9 CARDIOMYOPATHY, UNSPECIFIED: ICD-10-CM

## 2020-12-26 DIAGNOSIS — Z95.810 PRESENCE OF AUTOMATIC (IMPLANTABLE) CARDIAC DEFIBRILLATOR: ICD-10-CM

## 2020-12-26 PROCEDURE — 93295 DEV INTERROG REMOTE 1/2/MLT: CPT | Mod: ,,, | Performed by: INTERNAL MEDICINE

## 2020-12-26 PROCEDURE — 93296 REM INTERROG EVL PM/IDS: CPT | Performed by: INTERNAL MEDICINE

## 2020-12-26 PROCEDURE — 93295 CARDIAC DEVICE CHECK - REMOTE: ICD-10-PCS | Mod: ,,, | Performed by: INTERNAL MEDICINE

## 2020-12-31 ENCOUNTER — OFFICE VISIT (OUTPATIENT)
Dept: FAMILY MEDICINE | Facility: CLINIC | Age: 65
End: 2020-12-31
Payer: MEDICARE

## 2020-12-31 ENCOUNTER — TELEPHONE (OUTPATIENT)
Dept: CARDIOLOGY | Facility: HOSPITAL | Age: 65
End: 2020-12-31

## 2020-12-31 ENCOUNTER — TELEPHONE (OUTPATIENT)
Dept: ELECTROPHYSIOLOGY | Facility: CLINIC | Age: 65
End: 2020-12-31

## 2020-12-31 VITALS
TEMPERATURE: 98 F | SYSTOLIC BLOOD PRESSURE: 110 MMHG | DIASTOLIC BLOOD PRESSURE: 78 MMHG | BODY MASS INDEX: 35.66 KG/M2 | HEART RATE: 71 BPM | OXYGEN SATURATION: 97 % | WEIGHT: 302 LBS | HEIGHT: 77 IN

## 2020-12-31 DIAGNOSIS — Z09 HOSPITAL DISCHARGE FOLLOW-UP: Primary | ICD-10-CM

## 2020-12-31 DIAGNOSIS — I50.42 CHRONIC COMBINED SYSTOLIC AND DIASTOLIC CONGESTIVE HEART FAILURE: ICD-10-CM

## 2020-12-31 DIAGNOSIS — Z95.810 BIVENTRICULAR ICD (IMPLANTABLE CARDIOVERTER-DEFIBRILLATOR) IN PLACE: ICD-10-CM

## 2020-12-31 DIAGNOSIS — M25.559 HIP PAIN: ICD-10-CM

## 2020-12-31 DIAGNOSIS — Z86.79 HX OF VENTRICULAR FIBRILLATION: ICD-10-CM

## 2020-12-31 PROCEDURE — 1101F PR PT FALLS ASSESS DOC 0-1 FALLS W/OUT INJ PAST YR: ICD-10-PCS | Mod: CPTII,S$GLB,, | Performed by: FAMILY MEDICINE

## 2020-12-31 PROCEDURE — 3074F PR MOST RECENT SYSTOLIC BLOOD PRESSURE < 130 MM HG: ICD-10-PCS | Mod: CPTII,S$GLB,, | Performed by: FAMILY MEDICINE

## 2020-12-31 PROCEDURE — 99999 PR PBB SHADOW E&M-EST. PATIENT-LVL III: CPT | Mod: PBBFAC,,, | Performed by: FAMILY MEDICINE

## 2020-12-31 PROCEDURE — 3074F SYST BP LT 130 MM HG: CPT | Mod: CPTII,S$GLB,, | Performed by: FAMILY MEDICINE

## 2020-12-31 PROCEDURE — 3078F PR MOST RECENT DIASTOLIC BLOOD PRESSURE < 80 MM HG: ICD-10-PCS | Mod: CPTII,S$GLB,, | Performed by: FAMILY MEDICINE

## 2020-12-31 PROCEDURE — 99215 OFFICE O/P EST HI 40 MIN: CPT | Mod: S$GLB,,, | Performed by: FAMILY MEDICINE

## 2020-12-31 PROCEDURE — 3288F PR FALLS RISK ASSESSMENT DOCUMENTED: ICD-10-PCS | Mod: CPTII,S$GLB,, | Performed by: FAMILY MEDICINE

## 2020-12-31 PROCEDURE — 93005 EKG 12-LEAD: ICD-10-PCS | Mod: S$GLB,,, | Performed by: FAMILY MEDICINE

## 2020-12-31 PROCEDURE — 1125F AMNT PAIN NOTED PAIN PRSNT: CPT | Mod: S$GLB,,, | Performed by: FAMILY MEDICINE

## 2020-12-31 PROCEDURE — 99499 RISK ADDL DX/OHS AUDIT: ICD-10-PCS | Mod: S$GLB,,, | Performed by: FAMILY MEDICINE

## 2020-12-31 PROCEDURE — 3288F FALL RISK ASSESSMENT DOCD: CPT | Mod: CPTII,S$GLB,, | Performed by: FAMILY MEDICINE

## 2020-12-31 PROCEDURE — 3078F DIAST BP <80 MM HG: CPT | Mod: CPTII,S$GLB,, | Performed by: FAMILY MEDICINE

## 2020-12-31 PROCEDURE — 93010 EKG 12-LEAD: ICD-10-PCS | Mod: S$GLB,,, | Performed by: INTERNAL MEDICINE

## 2020-12-31 PROCEDURE — 3008F PR BODY MASS INDEX (BMI) DOCUMENTED: ICD-10-PCS | Mod: CPTII,S$GLB,, | Performed by: FAMILY MEDICINE

## 2020-12-31 PROCEDURE — 99999 PR PBB SHADOW E&M-EST. PATIENT-LVL III: ICD-10-PCS | Mod: PBBFAC,,, | Performed by: FAMILY MEDICINE

## 2020-12-31 PROCEDURE — 93010 ELECTROCARDIOGRAM REPORT: CPT | Mod: S$GLB,,, | Performed by: INTERNAL MEDICINE

## 2020-12-31 PROCEDURE — 99499 UNLISTED E&M SERVICE: CPT | Mod: S$GLB,,, | Performed by: FAMILY MEDICINE

## 2020-12-31 PROCEDURE — 1101F PT FALLS ASSESS-DOCD LE1/YR: CPT | Mod: CPTII,S$GLB,, | Performed by: FAMILY MEDICINE

## 2020-12-31 PROCEDURE — 1125F PR PAIN SEVERITY QUANTIFIED, PAIN PRESENT: ICD-10-PCS | Mod: S$GLB,,, | Performed by: FAMILY MEDICINE

## 2020-12-31 PROCEDURE — 93005 ELECTROCARDIOGRAM TRACING: CPT | Mod: S$GLB,,, | Performed by: FAMILY MEDICINE

## 2020-12-31 PROCEDURE — 99215 PR OFFICE/OUTPT VISIT, EST, LEVL V, 40-54 MIN: ICD-10-PCS | Mod: S$GLB,,, | Performed by: FAMILY MEDICINE

## 2020-12-31 PROCEDURE — 3008F BODY MASS INDEX DOCD: CPT | Mod: CPTII,S$GLB,, | Performed by: FAMILY MEDICINE

## 2020-12-31 RX ORDER — GABAPENTIN 100 MG/1
100 CAPSULE ORAL 3 TIMES DAILY
Qty: 270 CAPSULE | Refills: 1 | Status: SHIPPED | OUTPATIENT
Start: 2020-12-31 | End: 2023-02-03 | Stop reason: SDUPTHER

## 2020-12-31 RX ORDER — NAPROXEN SODIUM 550 MG/1
550 TABLET ORAL 2 TIMES DAILY WITH MEALS
Qty: 60 TABLET | Refills: 0 | Status: SHIPPED | OUTPATIENT
Start: 2020-12-31 | End: 2021-01-30

## 2020-12-31 RX ORDER — METOPROLOL SUCCINATE 50 MG/1
50 TABLET, EXTENDED RELEASE ORAL DAILY
Qty: 30 TABLET | Refills: 11 | Status: SHIPPED | OUTPATIENT
Start: 2020-12-31 | End: 2021-09-15 | Stop reason: SDUPTHER

## 2020-12-31 NOTE — TELEPHONE ENCOUNTER
Discussed VT event with Dr. Valderrama.  If bp permits, increase Toprol XL from 25mg po daily to 50 mg po daily.  Pts bp runs 110/78. Pt will increase to 50 mg po and keep track of bps and will report if bp is < 100 bpm or feels lightheaded, sluggish or dizzy.   Pt wishes to send prescription to [facility].    Will forward information to Dr. Kwong. Pt has 3mos post implant follow up on 2/15/21

## 2020-12-31 NOTE — TELEPHONE ENCOUNTER
Spoke with  Yony to get him a sooner appt with our NP.  The pt is now scheduled in January. The pt verbalized understanding and a letter of reminder will be mailed out.

## 2020-12-31 NOTE — TELEPHONE ENCOUNTER
RE: Treated VT event-orders received from Dr. Valderrama  Received: Today  Message Contents   MD Annie Martinez, RN; Juliana Mckenzie NP             thanks. I asked for him to get an appt with Jacquelyn sometime soon, to check vitals, increase BB, and start amiodarone   DPM    Previous

## 2020-12-31 NOTE — TELEPHONE ENCOUNTER
LVM for patient's daughter, Annie, re: ICD transmission received this morning.  Attempted to reach patient on mobile # with no answer.

## 2020-12-31 NOTE — PROGRESS NOTES
Chief Complaint   Patient presents with    Hospital Follow Up       HPI  Papito Bhakta is a 65 y.o. male with multiple medical diagnoses as listed in the medical history and problem list that presents for follow-up for recent hospital stay 11/18/20 for CHF with LE edema    HPI:      Papito Bhakta is a 65M with NICM with AICD and recent R CRT-D placement 9/27, hypertension, combined CHF (EF 18%, G3DD Sept 2020), hyperlipidemia, and obesity who presents from clinic for evaluation of weight gain and SOB. Patient with recent admission 09/28-10/15 for fluid overload, complicated by MICU admission for septic shock from serratia UTI, then discharged to rehab. He was started on entresto in the hospital and his BB was changed to MTP to allow better BP buffer. Apparently while in rehab his entresto was discontinued after being held for several days 2/2 borderline low BP and his lasix dose was decreased to support his BP better. His spironolactone was also discontinued. He was discharged from Rehab 10/29. Since being home he has gained 30#, dry weight around 270, today 301 in clinic with edema on CXR and . He was recommended to come into the ED for IV diuresis. The patient reports subjective dyspnea with minimal exertion, LE edema, and orthopnea. He reports compliance with meds, doubled up on his lasix. He admits he can do better with his diet. Denies cough, CP, fevers, urinary complaints.      Regarding his diuresis plan during last admission, he had several days of IV lasix pushes with moderate output eventually requiring lasix gtt. He required BiPAP that admission, today comfortable on RA.      ED: AFVSS, no oxygen requirements, , CXR with edema, no leukocytosis     * No surgery found *      Hospital Course:      Acute on chronic combined systolic and diastolic heart failure- improving    CRT-D placement   - Echo (9/30) showed EF 18%, grade III diastolic dysfunction, moderate MR, mild to moderate TR,  pulmonary hypertension  - subjective SOB with gross volume overload, no oxygen requirements, 30# weight gain ()  - did well with toprol 25 daily, entresto 24/26, aldactone 12.5 mg while at hospital, then discharged to rehab  - while at rehab entresto discontinued, spironolactone discontinued, and lasix decreased 2/2 hypotension  - prior to above changes, was on carvedilol 12.5 BID, ramipiril, aldactone  - start lasix 80 mv IVP BID (was on lasix gtt last admission, but required bipap at that time). Transitioned to oral lasix on 11/21 upon discharge   - continue entresto 24/26, hold for SBP < 110  - continue MTP XL 25 mg daily  - resumed spironolactone  - strict I/Os, daily weights, fluid restrict, low salt diet  - outpatient cardiology followup  - net negative 11.2 L since admission   - weight on admission 301 lbs. Weight upon discharge 285 pounds        Post hospital course:  He has gained his weight back since discharge. He is aware that he should increase his medication if his breathing worsens or his weight increases. He seems to be accustomed to baseline SOB. He has also had his defibrillator fire yesterday at 2 pm while he was working out. Per rhythm strip he was in VTach. He was working out at the time. He has felt fine since. His cardiologist has not been able to reach him but spoke to his daughter.   HPI    Patient Active Problem List   Diagnosis    Other hyperlipidemia    Essential hypertension    Chronic combined systolic and diastolic congestive heart failure    Hyperlipidemia    Severe obesity (BMI 35.0-39.9) with comorbidity    Biventricular ICD (implantable cardioverter-defibrillator) in place    Chronic left shoulder pain    Decreased range of motion of left shoulder    Infection of pacemaker pocket    Erosion of pacemaker pocket due to and not concurrent with implantation of cardiac pacemaker    NICM (nonischemic cardiomyopathy)    Infection of biventricular implantable  "cardioverter-defibrillator (ICD)         ROS  Review of Systems    Physical Exam  Vitals:    12/31/20 1011   BP: 110/78   BP Location: Left arm   Patient Position: Sitting   BP Method: Large (Automatic)   Pulse: 71   Temp: 98.2 °F (36.8 °C)   TempSrc: Oral   SpO2: 97%   Weight: (!) 137 kg (302 lb 0.5 oz)   Height: 6' 5" (1.956 m)    Body mass index is 35.82 kg/m².  Weight: (!) 137 kg (302 lb 0.5 oz)   Height: 6' 5" (195.6 cm)     Physical Exam  Vitals signs and nursing note reviewed.   Constitutional:       Appearance: He is well-developed.   HENT:      Head: Normocephalic and atraumatic.      Mouth/Throat:      Pharynx: No oropharyngeal exudate.   Cardiovascular:      Rate and Rhythm: Normal rate and regular rhythm.      Heart sounds: Normal heart sounds. No murmur. No friction rub. No gallop.    Pulmonary:      Effort: Pulmonary effort is normal. No respiratory distress.      Breath sounds: Rales present. No wheezing.      Comments: Bibasilar rales  Chest:      Chest wall: No tenderness.   Lymphadenopathy:      Cervical: No cervical adenopathy.   Skin:     General: Skin is warm and dry.   Neurological:      Mental Status: He is alert and oriented to person, place, and time.   Psychiatric:         Behavior: Behavior normal.         ALLERGIES AND MEDICATIONS: updated and reviewed.  Review of patient's allergies indicates:  No Known Allergies  Medication List with Changes/Refills   New Medications    NAPROXEN SODIUM (ANAPROX) 550 MG TABLET    Take 1 tablet (550 mg total) by mouth 2 (two) times daily with meals.   Current Medications    ASPIRIN (ECOTRIN) 325 MG EC TABLET    Take 1 tablet (325 mg total) by mouth once daily.    FUROSEMIDE (LASIX) 80 MG TABLET    TAKE 1 TABLET EVERY DAY    POTASSIUM CHLORIDE (K-TAB) 20 MEQ    Take 1 tablet (20 mEq total) by mouth once daily.    PRAVASTATIN (PRAVACHOL) 80 MG TABLET    Take 1 tablet (80 mg total) by mouth once daily.    SACUBITRIL-VALSARTAN (ENTRESTO) 24-26 MG PER TABLET   "  Take 1 tablet by mouth 2 (two) times daily. Hold if SBP < 110    SPIRONOLACTONE (ALDACTONE) 25 MG TABLET    Take 0.5 tablets (12.5 mg total) by mouth once daily. Hold if SBP < 110   Changed and/or Refilled Medications    Modified Medication Previous Medication    GABAPENTIN (NEURONTIN) 100 MG CAPSULE gabapentin (NEURONTIN) 100 MG capsule       Take 1 capsule (100 mg total) by mouth 3 (three) times daily. Take 100 mg by mouth 3 (three) times daily.    Take 100 mg by mouth 3 (three) times daily.    METOPROLOL SUCCINATE (TOPROL-XL) 50 MG 24 HR TABLET metoprolol succinate (TOPROL-XL) 25 MG 24 hr tablet       Take 1 tablet (50 mg total) by mouth once daily.    Take 1 tablet (25 mg total) by mouth once daily.   Discontinued Medications    IBUPROFEN (ADVIL,MOTRIN) 200 MG TABLET    Take 1 tablet (200 mg total) by mouth nightly as needed for Pain.       Assessment & Plan  1. Hospital discharge follow-up    2. Chronic combined systolic and diastolic congestive heart failure    3. Biventricular ICD (implantable cardioverter-defibrillator) in place    4. Hx of ventricular fibrillation    5. Hip pain        Problem List Items Addressed This Visit        Cardiac/Vascular    Biventricular ICD (implantable cardioverter-defibrillator) in place  -has not had any recurrence of firing since yesterday    Chronic combined systolic and diastolic congestive heart failure  He has regained the weight he lost prior to his inpatient stay  He has not been taking lasix BID as he did not realize he had gained weight until recently this week  He was not weight himself and has been eating foods over the holiday that have increased his weight  I did let him know his heart is very weak and he should not stress it further with volume overload  Increase to BID dosin      Other Visit Diagnoses     Hospital discharge follow-up    -  Primary  He has regained the weight he lost prior to his inpatient stay  He has not been taking lasix BID as he did not  realize he had gained weight until recently this week  He was not weight himself and has been eating foods over the holiday that have increased his weight  I did let him know his heart is very weak and he should not stress it further with volume overload  Increase to BID dosing and we will call him on Monday to assess if his weight has improved or if IV diuresis is needed again    Hx of ventricular fibrillation     -EKG WNL with some PVCs  Will epic msg his cardiologist to see if further instruction is needed for pacemaker fire yesterday        Relevant Orders    EKG 12-lead    Hip pain      -he would like to take naproxen for the pain in his hip     Relevant Medications    gabapentin (NEURONTIN) 100 MG capsule    naproxen sodium (ANAPROX) 550 MG tablet          Follow up in about 1 month (around 1/31/2021) for Follow up.    Other Orders Placed This Visit:  Orders Placed This Encounter   Procedures    EKG 12-lead

## 2020-12-31 NOTE — TELEPHONE ENCOUNTER
BiV ICD 9/28/2020 for NICM and CHF  Remote device transmission received; Merlin alert for HV therapy    Event Date/Time:  12/30/2020 @ 2:04 pm  Event Type:  Initially c/w MMVT in VF zone, following ATP -> rhythm degenerates to VF  Ventricular CL:  266 ms  [place]:  18 secs  Therapy Delivered:  ATP + Shock  Appropriate Therapy:  Yes  Successful:  Yes, HV therapy converted rhythm  Pt Symptomatic:  No symptoms reported.  S/w patient's daughter, Annie, who thinks he could have been taking a nap at that time yesterday.  When asked, she also noted he has not complained of chest pain or other symptoms recently.    No noted hx of VT or VT ablation  Toprol XL 25mg daily and Entresto  TTE 9/30/20 showed EF 18%  Last seen EP clinic 8/12/20 (pre-implant), next follow-up 2/15/2021

## 2020-12-31 NOTE — TELEPHONE ENCOUNTER
----- Message from Jim Kwong MD sent at 12/31/2020  1:32 PM CST -----  Regarding: RE: device going off  ok  ----- Message -----  From: Carrie Coburn  Sent: 12/31/2020   1:24 PM CST  To: Jim Kwong MD, Annie Newton, RN, #  Subject: RE: device going off                             His beta blocker has been titrated via phone order.  The VT event was reviewed with Dr. Valderrama this morning.  He has an appt for 1/15/2021 with Juliana Mckenzie.  OK to keep that date to discuss amio?    -Carrie  ----- Message -----  From: Jim Kwong MD  Sent: 12/31/2020  11:25 AM CST  To: Carrie Coburn, Juliana Mckenzie NP, #  Subject: FW: device going off                             Thanks  Please get him an appointment with Jacquelyn Mckenzie, so we can uptitrate the beta blocker if his BP will tolerate it, and also start amiodarone.  thanks  DPM  ----- Message -----  From: Brynn To MD  Sent: 12/31/2020  11:09 AM CST  To: Jim Kwong MD  Subject: device going off                                 I'm seeing him in clinic today and his defibrillator fired yesterday around 2 pm. Looks like he went into ventricular fibrillation. He says he was doing some floor exercises when he felt the shock. Has not fired since. We will repeat the EKG but your staff was only able to get in touch with his daughter so I did not know if you had further instructions for him    Brynn To MD

## 2021-01-05 ENCOUNTER — TELEPHONE (OUTPATIENT)
Dept: FAMILY MEDICINE | Facility: CLINIC | Age: 66
End: 2021-01-05

## 2021-01-13 ENCOUNTER — TELEPHONE (OUTPATIENT)
Dept: FAMILY MEDICINE | Facility: CLINIC | Age: 66
End: 2021-01-13

## 2021-01-15 ENCOUNTER — CLINICAL SUPPORT (OUTPATIENT)
Dept: CARDIOLOGY | Facility: HOSPITAL | Age: 66
End: 2021-01-15
Attending: INTERNAL MEDICINE
Payer: MEDICARE

## 2021-01-15 ENCOUNTER — HOSPITAL ENCOUNTER (OUTPATIENT)
Dept: CARDIOLOGY | Facility: CLINIC | Age: 66
Discharge: HOME | End: 2021-01-15
Payer: MEDICARE

## 2021-01-15 ENCOUNTER — OFFICE VISIT (OUTPATIENT)
Dept: ELECTROPHYSIOLOGY | Facility: CLINIC | Age: 66
End: 2021-01-15
Payer: MEDICARE

## 2021-01-15 VITALS
SYSTOLIC BLOOD PRESSURE: 112 MMHG | HEART RATE: 66 BPM | HEIGHT: 77 IN | WEIGHT: 291 LBS | BODY MASS INDEX: 34.36 KG/M2 | DIASTOLIC BLOOD PRESSURE: 64 MMHG

## 2021-01-15 DIAGNOSIS — Z95.810 BIVENTRICULAR ICD (IMPLANTABLE CARDIOVERTER-DEFIBRILLATOR) IN PLACE: ICD-10-CM

## 2021-01-15 DIAGNOSIS — I50.42 CHRONIC COMBINED SYSTOLIC AND DIASTOLIC CONGESTIVE HEART FAILURE: ICD-10-CM

## 2021-01-15 DIAGNOSIS — I42.8 NICM (NONISCHEMIC CARDIOMYOPATHY): ICD-10-CM

## 2021-01-15 DIAGNOSIS — I42.8 NICM (NONISCHEMIC CARDIOMYOPATHY): Primary | Chronic | ICD-10-CM

## 2021-01-15 DIAGNOSIS — I10 ESSENTIAL HYPERTENSION: Chronic | ICD-10-CM

## 2021-01-15 DIAGNOSIS — I50.9 CONGESTIVE HEART FAILURE, UNSPECIFIED HF CHRONICITY, UNSPECIFIED HEART FAILURE TYPE: ICD-10-CM

## 2021-01-15 PROCEDURE — 3008F BODY MASS INDEX DOCD: CPT | Mod: CPTII,S$GLB,, | Performed by: NURSE PRACTITIONER

## 2021-01-15 PROCEDURE — 93005 ELECTROCARDIOGRAM TRACING: CPT | Mod: S$GLB,,, | Performed by: INTERNAL MEDICINE

## 2021-01-15 PROCEDURE — 3078F DIAST BP <80 MM HG: CPT | Mod: CPTII,S$GLB,, | Performed by: NURSE PRACTITIONER

## 2021-01-15 PROCEDURE — 93010 RHYTHM STRIP: ICD-10-PCS | Mod: S$GLB,,, | Performed by: INTERNAL MEDICINE

## 2021-01-15 PROCEDURE — 3008F PR BODY MASS INDEX (BMI) DOCUMENTED: ICD-10-PCS | Mod: CPTII,S$GLB,, | Performed by: NURSE PRACTITIONER

## 2021-01-15 PROCEDURE — 99214 OFFICE O/P EST MOD 30 MIN: CPT | Mod: S$GLB,,, | Performed by: NURSE PRACTITIONER

## 2021-01-15 PROCEDURE — 93005 RHYTHM STRIP: ICD-10-PCS | Mod: S$GLB,,, | Performed by: INTERNAL MEDICINE

## 2021-01-15 PROCEDURE — 93010 ELECTROCARDIOGRAM REPORT: CPT | Mod: S$GLB,,, | Performed by: INTERNAL MEDICINE

## 2021-01-15 PROCEDURE — 3074F SYST BP LT 130 MM HG: CPT | Mod: CPTII,S$GLB,, | Performed by: NURSE PRACTITIONER

## 2021-01-15 PROCEDURE — 3074F PR MOST RECENT SYSTOLIC BLOOD PRESSURE < 130 MM HG: ICD-10-PCS | Mod: CPTII,S$GLB,, | Performed by: NURSE PRACTITIONER

## 2021-01-15 PROCEDURE — 93284 PRGRMG EVAL IMPLANTABLE DFB: CPT

## 2021-01-15 PROCEDURE — 1125F AMNT PAIN NOTED PAIN PRSNT: CPT | Mod: S$GLB,,, | Performed by: NURSE PRACTITIONER

## 2021-01-15 PROCEDURE — 93284 CARDIAC DEVICE CHECK - IN CLINIC & HOSPITAL: ICD-10-PCS | Mod: 26,,, | Performed by: INTERNAL MEDICINE

## 2021-01-15 PROCEDURE — 1125F PR PAIN SEVERITY QUANTIFIED, PAIN PRESENT: ICD-10-PCS | Mod: S$GLB,,, | Performed by: NURSE PRACTITIONER

## 2021-01-15 PROCEDURE — 99999 PR PBB SHADOW E&M-EST. PATIENT-LVL IV: ICD-10-PCS | Mod: PBBFAC,,, | Performed by: NURSE PRACTITIONER

## 2021-01-15 PROCEDURE — 99214 PR OFFICE/OUTPT VISIT, EST, LEVL IV, 30-39 MIN: ICD-10-PCS | Mod: S$GLB,,, | Performed by: NURSE PRACTITIONER

## 2021-01-15 PROCEDURE — 3288F PR FALLS RISK ASSESSMENT DOCUMENTED: ICD-10-PCS | Mod: CPTII,S$GLB,, | Performed by: NURSE PRACTITIONER

## 2021-01-15 PROCEDURE — 93284 PRGRMG EVAL IMPLANTABLE DFB: CPT | Mod: 26,,, | Performed by: INTERNAL MEDICINE

## 2021-01-15 PROCEDURE — 3288F FALL RISK ASSESSMENT DOCD: CPT | Mod: CPTII,S$GLB,, | Performed by: NURSE PRACTITIONER

## 2021-01-15 PROCEDURE — 1101F PR PT FALLS ASSESS DOC 0-1 FALLS W/OUT INJ PAST YR: ICD-10-PCS | Mod: CPTII,S$GLB,, | Performed by: NURSE PRACTITIONER

## 2021-01-15 PROCEDURE — 3078F PR MOST RECENT DIASTOLIC BLOOD PRESSURE < 80 MM HG: ICD-10-PCS | Mod: CPTII,S$GLB,, | Performed by: NURSE PRACTITIONER

## 2021-01-15 PROCEDURE — 99999 PR PBB SHADOW E&M-EST. PATIENT-LVL IV: CPT | Mod: PBBFAC,,, | Performed by: NURSE PRACTITIONER

## 2021-01-15 PROCEDURE — 1101F PT FALLS ASSESS-DOCD LE1/YR: CPT | Mod: CPTII,S$GLB,, | Performed by: NURSE PRACTITIONER

## 2021-01-15 RX ORDER — AMIODARONE HYDROCHLORIDE 200 MG/1
200 TABLET ORAL DAILY
Qty: 60 TABLET | Refills: 1 | Status: SHIPPED | OUTPATIENT
Start: 2021-01-15 | End: 2021-01-19 | Stop reason: SDUPTHER

## 2021-01-19 ENCOUNTER — TELEPHONE (OUTPATIENT)
Dept: ELECTROPHYSIOLOGY | Facility: CLINIC | Age: 66
End: 2021-01-19

## 2021-01-19 RX ORDER — AMIODARONE HYDROCHLORIDE 200 MG/1
200 TABLET ORAL DAILY
Qty: 90 TABLET | Refills: 1 | Status: SHIPPED | OUTPATIENT
Start: 2021-01-19 | End: 2021-08-02

## 2021-02-01 ENCOUNTER — OFFICE VISIT (OUTPATIENT)
Dept: FAMILY MEDICINE | Facility: CLINIC | Age: 66
End: 2021-02-01
Payer: MEDICARE

## 2021-02-01 VITALS
SYSTOLIC BLOOD PRESSURE: 122 MMHG | DIASTOLIC BLOOD PRESSURE: 60 MMHG | HEIGHT: 77 IN | OXYGEN SATURATION: 95 % | WEIGHT: 303.44 LBS | HEART RATE: 61 BPM | TEMPERATURE: 98 F | RESPIRATION RATE: 18 BRPM | BODY MASS INDEX: 35.83 KG/M2

## 2021-02-01 DIAGNOSIS — Z86.39 HISTORY OF LOW POTASSIUM: ICD-10-CM

## 2021-02-01 DIAGNOSIS — E66.01 SEVERE OBESITY (BMI 35.0-39.9) WITH COMORBIDITY: ICD-10-CM

## 2021-02-01 DIAGNOSIS — I73.9 PERIPHERAL VASCULAR DISEASE, UNSPECIFIED: ICD-10-CM

## 2021-02-01 DIAGNOSIS — Z95.810 BIVENTRICULAR ICD (IMPLANTABLE CARDIOVERTER-DEFIBRILLATOR) IN PLACE: ICD-10-CM

## 2021-02-01 DIAGNOSIS — I50.42 CHRONIC COMBINED SYSTOLIC AND DIASTOLIC CONGESTIVE HEART FAILURE: Primary | ICD-10-CM

## 2021-02-01 DIAGNOSIS — D69.6 THROMBOCYTOPENIA, UNSPECIFIED: ICD-10-CM

## 2021-02-01 DIAGNOSIS — I10 ESSENTIAL HYPERTENSION: Chronic | ICD-10-CM

## 2021-02-01 PROBLEM — T82.7XXA: Status: RESOLVED | Noted: 2020-06-16 | Resolved: 2021-02-01

## 2021-02-01 PROCEDURE — 3008F BODY MASS INDEX DOCD: CPT | Mod: CPTII,S$GLB,, | Performed by: FAMILY MEDICINE

## 2021-02-01 PROCEDURE — 1125F AMNT PAIN NOTED PAIN PRSNT: CPT | Mod: S$GLB,,, | Performed by: FAMILY MEDICINE

## 2021-02-01 PROCEDURE — 99499 RISK ADDL DX/OHS AUDIT: ICD-10-PCS | Mod: S$PBB,,, | Performed by: FAMILY MEDICINE

## 2021-02-01 PROCEDURE — 3074F PR MOST RECENT SYSTOLIC BLOOD PRESSURE < 130 MM HG: ICD-10-PCS | Mod: CPTII,S$GLB,, | Performed by: FAMILY MEDICINE

## 2021-02-01 PROCEDURE — 99499 UNLISTED E&M SERVICE: CPT | Mod: S$PBB,,, | Performed by: FAMILY MEDICINE

## 2021-02-01 PROCEDURE — 99999 PR PBB SHADOW E&M-EST. PATIENT-LVL IV: ICD-10-PCS | Mod: PBBFAC,,, | Performed by: FAMILY MEDICINE

## 2021-02-01 PROCEDURE — 3288F FALL RISK ASSESSMENT DOCD: CPT | Mod: CPTII,S$GLB,, | Performed by: FAMILY MEDICINE

## 2021-02-01 PROCEDURE — 3078F DIAST BP <80 MM HG: CPT | Mod: CPTII,S$GLB,, | Performed by: FAMILY MEDICINE

## 2021-02-01 PROCEDURE — 3008F PR BODY MASS INDEX (BMI) DOCUMENTED: ICD-10-PCS | Mod: CPTII,S$GLB,, | Performed by: FAMILY MEDICINE

## 2021-02-01 PROCEDURE — 1101F PT FALLS ASSESS-DOCD LE1/YR: CPT | Mod: CPTII,S$GLB,, | Performed by: FAMILY MEDICINE

## 2021-02-01 PROCEDURE — 99999 PR PBB SHADOW E&M-EST. PATIENT-LVL IV: CPT | Mod: PBBFAC,,, | Performed by: FAMILY MEDICINE

## 2021-02-01 PROCEDURE — 3288F PR FALLS RISK ASSESSMENT DOCUMENTED: ICD-10-PCS | Mod: CPTII,S$GLB,, | Performed by: FAMILY MEDICINE

## 2021-02-01 PROCEDURE — 3078F PR MOST RECENT DIASTOLIC BLOOD PRESSURE < 80 MM HG: ICD-10-PCS | Mod: CPTII,S$GLB,, | Performed by: FAMILY MEDICINE

## 2021-02-01 PROCEDURE — 3074F SYST BP LT 130 MM HG: CPT | Mod: CPTII,S$GLB,, | Performed by: FAMILY MEDICINE

## 2021-02-01 PROCEDURE — 1125F PR PAIN SEVERITY QUANTIFIED, PAIN PRESENT: ICD-10-PCS | Mod: S$GLB,,, | Performed by: FAMILY MEDICINE

## 2021-02-01 PROCEDURE — 99214 OFFICE O/P EST MOD 30 MIN: CPT | Mod: S$GLB,,, | Performed by: FAMILY MEDICINE

## 2021-02-01 PROCEDURE — 99214 PR OFFICE/OUTPT VISIT, EST, LEVL IV, 30-39 MIN: ICD-10-PCS | Mod: S$GLB,,, | Performed by: FAMILY MEDICINE

## 2021-02-01 PROCEDURE — 1101F PR PT FALLS ASSESS DOC 0-1 FALLS W/OUT INJ PAST YR: ICD-10-PCS | Mod: CPTII,S$GLB,, | Performed by: FAMILY MEDICINE

## 2021-02-01 RX ORDER — FUROSEMIDE 80 MG/1
TABLET ORAL
Qty: 180 TABLET | Refills: 1 | Status: SHIPPED | OUTPATIENT
Start: 2021-02-01 | End: 2022-07-15 | Stop reason: SDUPTHER

## 2021-02-01 RX ORDER — POTASSIUM CHLORIDE 1500 MG/1
20 TABLET, EXTENDED RELEASE ORAL DAILY
Qty: 90 TABLET | Refills: 3 | Status: SHIPPED | OUTPATIENT
Start: 2021-02-01 | End: 2022-07-15 | Stop reason: SDUPTHER

## 2021-02-03 ENCOUNTER — TELEPHONE (OUTPATIENT)
Dept: FAMILY MEDICINE | Facility: CLINIC | Age: 66
End: 2021-02-03

## 2021-03-11 ENCOUNTER — OFFICE VISIT (OUTPATIENT)
Dept: FAMILY MEDICINE | Facility: CLINIC | Age: 66
End: 2021-03-11
Payer: MEDICARE

## 2021-03-11 ENCOUNTER — LAB VISIT (OUTPATIENT)
Dept: LAB | Facility: HOSPITAL | Age: 66
End: 2021-03-11
Attending: FAMILY MEDICINE
Payer: MEDICARE

## 2021-03-11 VITALS
DIASTOLIC BLOOD PRESSURE: 52 MMHG | HEART RATE: 62 BPM | SYSTOLIC BLOOD PRESSURE: 100 MMHG | OXYGEN SATURATION: 98 % | HEIGHT: 77 IN | TEMPERATURE: 99 F | BODY MASS INDEX: 34.05 KG/M2 | WEIGHT: 288.38 LBS

## 2021-03-11 DIAGNOSIS — I10 ESSENTIAL HYPERTENSION: ICD-10-CM

## 2021-03-11 DIAGNOSIS — Z95.810 BIVENTRICULAR ICD (IMPLANTABLE CARDIOVERTER-DEFIBRILLATOR) IN PLACE: ICD-10-CM

## 2021-03-11 DIAGNOSIS — I42.8 NICM (NONISCHEMIC CARDIOMYOPATHY): Chronic | ICD-10-CM

## 2021-03-11 DIAGNOSIS — I50.42 CHRONIC COMBINED SYSTOLIC AND DIASTOLIC CONGESTIVE HEART FAILURE: Primary | ICD-10-CM

## 2021-03-11 LAB
ANION GAP SERPL CALC-SCNC: 10 MMOL/L (ref 8–16)
BUN SERPL-MCNC: 27 MG/DL (ref 8–23)
CALCIUM SERPL-MCNC: 9.1 MG/DL (ref 8.7–10.5)
CHLORIDE SERPL-SCNC: 105 MMOL/L (ref 95–110)
CO2 SERPL-SCNC: 25 MMOL/L (ref 23–29)
CREAT SERPL-MCNC: 1.5 MG/DL (ref 0.5–1.4)
EST. GFR  (AFRICAN AMERICAN): 56 ML/MIN/1.73 M^2
EST. GFR  (NON AFRICAN AMERICAN): 48 ML/MIN/1.73 M^2
GLUCOSE SERPL-MCNC: 88 MG/DL (ref 70–110)
POTASSIUM SERPL-SCNC: 4.7 MMOL/L (ref 3.5–5.1)
SODIUM SERPL-SCNC: 140 MMOL/L (ref 136–145)

## 2021-03-11 PROCEDURE — 3008F PR BODY MASS INDEX (BMI) DOCUMENTED: ICD-10-PCS | Mod: CPTII,S$GLB,, | Performed by: FAMILY MEDICINE

## 2021-03-11 PROCEDURE — 3008F BODY MASS INDEX DOCD: CPT | Mod: CPTII,S$GLB,, | Performed by: FAMILY MEDICINE

## 2021-03-11 PROCEDURE — 3078F DIAST BP <80 MM HG: CPT | Mod: CPTII,S$GLB,, | Performed by: FAMILY MEDICINE

## 2021-03-11 PROCEDURE — 3288F FALL RISK ASSESSMENT DOCD: CPT | Mod: CPTII,S$GLB,, | Performed by: FAMILY MEDICINE

## 2021-03-11 PROCEDURE — 99999 PR PBB SHADOW E&M-EST. PATIENT-LVL III: ICD-10-PCS | Mod: PBBFAC,,, | Performed by: FAMILY MEDICINE

## 2021-03-11 PROCEDURE — 3078F PR MOST RECENT DIASTOLIC BLOOD PRESSURE < 80 MM HG: ICD-10-PCS | Mod: CPTII,S$GLB,, | Performed by: FAMILY MEDICINE

## 2021-03-11 PROCEDURE — 1101F PT FALLS ASSESS-DOCD LE1/YR: CPT | Mod: CPTII,S$GLB,, | Performed by: FAMILY MEDICINE

## 2021-03-11 PROCEDURE — 3288F PR FALLS RISK ASSESSMENT DOCUMENTED: ICD-10-PCS | Mod: CPTII,S$GLB,, | Performed by: FAMILY MEDICINE

## 2021-03-11 PROCEDURE — 99999 PR PBB SHADOW E&M-EST. PATIENT-LVL III: CPT | Mod: PBBFAC,,, | Performed by: FAMILY MEDICINE

## 2021-03-11 PROCEDURE — 36415 COLL VENOUS BLD VENIPUNCTURE: CPT | Mod: PO | Performed by: FAMILY MEDICINE

## 2021-03-11 PROCEDURE — 3074F PR MOST RECENT SYSTOLIC BLOOD PRESSURE < 130 MM HG: ICD-10-PCS | Mod: CPTII,S$GLB,, | Performed by: FAMILY MEDICINE

## 2021-03-11 PROCEDURE — 1125F PR PAIN SEVERITY QUANTIFIED, PAIN PRESENT: ICD-10-PCS | Mod: S$GLB,,, | Performed by: FAMILY MEDICINE

## 2021-03-11 PROCEDURE — 80048 BASIC METABOLIC PNL TOTAL CA: CPT | Performed by: FAMILY MEDICINE

## 2021-03-11 PROCEDURE — 99214 OFFICE O/P EST MOD 30 MIN: CPT | Mod: S$GLB,,, | Performed by: FAMILY MEDICINE

## 2021-03-11 PROCEDURE — 1125F AMNT PAIN NOTED PAIN PRSNT: CPT | Mod: S$GLB,,, | Performed by: FAMILY MEDICINE

## 2021-03-11 PROCEDURE — 3074F SYST BP LT 130 MM HG: CPT | Mod: CPTII,S$GLB,, | Performed by: FAMILY MEDICINE

## 2021-03-11 PROCEDURE — 1101F PR PT FALLS ASSESS DOC 0-1 FALLS W/OUT INJ PAST YR: ICD-10-PCS | Mod: CPTII,S$GLB,, | Performed by: FAMILY MEDICINE

## 2021-03-11 PROCEDURE — 99214 PR OFFICE/OUTPT VISIT, EST, LEVL IV, 30-39 MIN: ICD-10-PCS | Mod: S$GLB,,, | Performed by: FAMILY MEDICINE

## 2021-03-26 ENCOUNTER — CLINICAL SUPPORT (OUTPATIENT)
Dept: CARDIOLOGY | Facility: HOSPITAL | Age: 66
End: 2021-03-26
Payer: MEDICARE

## 2021-03-26 DIAGNOSIS — Z95.810 PRESENCE OF AUTOMATIC (IMPLANTABLE) CARDIAC DEFIBRILLATOR: ICD-10-CM

## 2021-03-26 PROCEDURE — 93295 CARDIAC DEVICE CHECK - REMOTE: ICD-10-PCS | Mod: ,,, | Performed by: INTERNAL MEDICINE

## 2021-03-26 PROCEDURE — 93296 REM INTERROG EVL PM/IDS: CPT | Performed by: INTERNAL MEDICINE

## 2021-03-26 PROCEDURE — 93295 DEV INTERROG REMOTE 1/2/MLT: CPT | Mod: ,,, | Performed by: INTERNAL MEDICINE

## 2021-03-31 ENCOUNTER — HOSPITAL ENCOUNTER (EMERGENCY)
Facility: HOSPITAL | Age: 66
Discharge: HOME | End: 2021-03-31
Attending: EMERGENCY MEDICINE
Payer: MEDICARE

## 2021-03-31 VITALS
BODY MASS INDEX: 33.06 KG/M2 | HEIGHT: 77 IN | OXYGEN SATURATION: 98 % | WEIGHT: 280 LBS | SYSTOLIC BLOOD PRESSURE: 124 MMHG | HEART RATE: 72 BPM | DIASTOLIC BLOOD PRESSURE: 66 MMHG | RESPIRATION RATE: 18 BRPM | TEMPERATURE: 98 F
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
## 2021-03-31 DIAGNOSIS — S02.85XB ORBITAL FRACTURE, OPEN, INITIAL ENCOUNTER: Primary | ICD-10-CM

## 2021-03-31 DIAGNOSIS — S01.81XA FACIAL LACERATION, INITIAL ENCOUNTER: ICD-10-CM

## 2021-03-31 DIAGNOSIS — H57.04 TRAUMATIC MYDRIASIS: ICD-10-CM

## 2021-03-31 LAB
ABO + RH BLD: NORMAL
ALBUMIN SERPL BCP-MCNC: 3.5 G/DL (ref 3.5–5.2)
ALP SERPL-CCNC: 63 U/L (ref 55–135)
ALT SERPL W/O P-5'-P-CCNC: 8 U/L (ref 10–44)
ANION GAP SERPL CALC-SCNC: 9 MMOL/L (ref 8–16)
AST SERPL-CCNC: 16 U/L (ref 10–40)
BASOPHILS # BLD AUTO: 0.04 K/UL (ref 0–0.2)
BASOPHILS NFR BLD: 0.8 % (ref 0–1.9)
BILIRUB SERPL-MCNC: 0.6 MG/DL (ref 0.1–1)
BLD GP AB SCN CELLS X3 SERPL QL: NORMAL
BUN SERPL-MCNC: 16 MG/DL (ref 8–23)
CALCIUM SERPL-MCNC: 9 MG/DL (ref 8.7–10.5)
CHLORIDE SERPL-SCNC: 104 MMOL/L (ref 95–110)
CO2 SERPL-SCNC: 28 MMOL/L (ref 23–29)
CREAT SERPL-MCNC: 1.3 MG/DL (ref 0.5–1.4)
DIFFERENTIAL METHOD: ABNORMAL
EOSINOPHIL # BLD AUTO: 0.1 K/UL (ref 0–0.5)
EOSINOPHIL NFR BLD: 2.6 % (ref 0–8)
ERYTHROCYTE [DISTWIDTH] IN BLOOD BY AUTOMATED COUNT: 15.9 % (ref 11.5–14.5)
EST. GFR  (AFRICAN AMERICAN): >60 ML/MIN/1.73 M^2
EST. GFR  (NON AFRICAN AMERICAN): 57.3 ML/MIN/1.73 M^2
GLUCOSE SERPL-MCNC: 90 MG/DL (ref 70–110)
HCT VFR BLD AUTO: 41.3 % (ref 40–54)
HGB BLD-MCNC: 12.6 G/DL (ref 14–18)
IMM GRANULOCYTES # BLD AUTO: 0.01 K/UL (ref 0–0.04)
IMM GRANULOCYTES NFR BLD AUTO: 0.2 % (ref 0–0.5)
INR PPP: 1 (ref 0.8–1.2)
LYMPHOCYTES # BLD AUTO: 1.2 K/UL (ref 1–4.8)
LYMPHOCYTES NFR BLD: 23.2 % (ref 18–48)
MCH RBC QN AUTO: 24.3 PG (ref 27–31)
MCHC RBC AUTO-ENTMCNC: 30.5 G/DL (ref 32–36)
MCV RBC AUTO: 80 FL (ref 82–98)
MONOCYTES # BLD AUTO: 0.4 K/UL (ref 0.3–1)
MONOCYTES NFR BLD: 8 % (ref 4–15)
NEUTROPHILS # BLD AUTO: 3.3 K/UL (ref 1.8–7.7)
NEUTROPHILS NFR BLD: 65.2 % (ref 38–73)
NRBC BLD-RTO: 0 /100 WBC
PLATELET # BLD AUTO: 150 K/UL (ref 150–450)
PMV BLD AUTO: 11.5 FL (ref 9.2–12.9)
POTASSIUM SERPL-SCNC: 4.3 MMOL/L (ref 3.5–5.1)
PROT SERPL-MCNC: 7.8 G/DL (ref 6–8.4)
PROTHROMBIN TIME: 11.2 SEC (ref 9–12.5)
RBC # BLD AUTO: 5.19 M/UL (ref 4.6–6.2)
SODIUM SERPL-SCNC: 141 MMOL/L (ref 136–145)
WBC # BLD AUTO: 5.01 K/UL (ref 3.9–12.7)

## 2021-03-31 PROCEDURE — 25000003 PHARM REV CODE 250: Performed by: EMERGENCY MEDICINE

## 2021-03-31 PROCEDURE — 99285 PR EMERGENCY DEPT VISIT,LEVEL V: ICD-10-PCS | Mod: 25,,, | Performed by: EMERGENCY MEDICINE

## 2021-03-31 PROCEDURE — 12011 RPR F/E/E/N/L/M 2.5 CM/<: CPT | Mod: ,,, | Performed by: EMERGENCY MEDICINE

## 2021-03-31 PROCEDURE — 86803 HEPATITIS C AB TEST: CPT | Performed by: EMERGENCY MEDICINE

## 2021-03-31 PROCEDURE — 99284 EMERGENCY DEPT VISIT MOD MDM: CPT | Mod: 25

## 2021-03-31 PROCEDURE — 86900 BLOOD TYPING SEROLOGIC ABO: CPT | Performed by: EMERGENCY MEDICINE

## 2021-03-31 PROCEDURE — 85610 PROTHROMBIN TIME: CPT | Performed by: EMERGENCY MEDICINE

## 2021-03-31 PROCEDURE — 80053 COMPREHEN METABOLIC PANEL: CPT | Performed by: EMERGENCY MEDICINE

## 2021-03-31 PROCEDURE — 12011 RPR F/E/E/N/L/M 2.5 CM/<: CPT

## 2021-03-31 PROCEDURE — 99285 EMERGENCY DEPT VISIT HI MDM: CPT | Mod: 25,,, | Performed by: EMERGENCY MEDICINE

## 2021-03-31 PROCEDURE — 12011 PR RESUPERF WND FACE <2.5 CM: ICD-10-PCS | Mod: ,,, | Performed by: EMERGENCY MEDICINE

## 2021-03-31 PROCEDURE — 85025 COMPLETE CBC W/AUTO DIFF WBC: CPT | Performed by: EMERGENCY MEDICINE

## 2021-03-31 RX ORDER — AMOXICILLIN AND CLAVULANATE POTASSIUM 875; 125 MG/1; MG/1
1 TABLET, FILM COATED ORAL
Status: COMPLETED | OUTPATIENT
Start: 2021-03-31 | End: 2021-03-31

## 2021-03-31 RX ORDER — OXYMETAZOLINE HCL 0.05 %
1 SPRAY, NON-AEROSOL (ML) NASAL 3 TIMES DAILY
Qty: 15 ML | Refills: 0 | Status: SHIPPED | OUTPATIENT
Start: 2021-03-31 | End: 2021-04-03

## 2021-03-31 RX ORDER — AMOXICILLIN AND CLAVULANATE POTASSIUM 875; 125 MG/1; MG/1
1 TABLET, FILM COATED ORAL 2 TIMES DAILY
Qty: 14 TABLET | Refills: 0 | Status: SHIPPED | OUTPATIENT
Start: 2021-03-31 | End: 2021-06-11 | Stop reason: ALTCHOICE

## 2021-03-31 RX ORDER — BACITRACIN ZINC 500 UNIT/G
1 OINTMENT (GRAM) TOPICAL
Status: DISCONTINUED | OUTPATIENT
Start: 2021-03-31 | End: 2021-03-31

## 2021-03-31 RX ORDER — ACETAMINOPHEN 500 MG
1000 TABLET ORAL
Status: COMPLETED | OUTPATIENT
Start: 2021-03-31 | End: 2021-03-31

## 2021-03-31 RX ORDER — PREDNISOLONE ACETATE 10 MG/ML
1 SUSPENSION/ DROPS OPHTHALMIC 4 TIMES DAILY
Qty: 15 ML | Refills: 0 | Status: SHIPPED | OUTPATIENT
Start: 2021-03-31 | End: 2021-04-10

## 2021-03-31 RX ORDER — CYCLOPENTOLATE HYDROCHLORIDE 10 MG/ML
2 SOLUTION/ DROPS OPHTHALMIC 3 TIMES DAILY
Qty: 15 ML | Refills: 0 | Status: SHIPPED | OUTPATIENT
Start: 2021-03-31 | End: 2021-06-11 | Stop reason: ALTCHOICE

## 2021-03-31 RX ORDER — LIDOCAINE HYDROCHLORIDE AND EPINEPHRINE 10; 10 MG/ML; UG/ML
10 INJECTION, SOLUTION INFILTRATION; PERINEURAL ONCE
Status: COMPLETED | OUTPATIENT
Start: 2021-03-31 | End: 2021-03-31

## 2021-03-31 RX ORDER — BACITRACIN ZINC 500 [USP'U]/G
OINTMENT TOPICAL
Status: COMPLETED | OUTPATIENT
Start: 2021-03-31 | End: 2021-03-31

## 2021-03-31 RX ADMIN — ACETAMINOPHEN 1000 MG: 500 TABLET ORAL at 08:03

## 2021-03-31 RX ADMIN — BACITRACIN 1 EACH: 500 OINTMENT TOPICAL at 11:03

## 2021-03-31 RX ADMIN — AMOXICILLIN AND CLAVULANATE POTASSIUM 1 TABLET: 875; 125 TABLET, FILM COATED ORAL at 10:03

## 2021-03-31 RX ADMIN — LIDOCAINE HYDROCHLORIDE AND EPINEPHRINE 10 ML: 10; 10 INJECTION, SOLUTION INFILTRATION; PERINEURAL at 07:03

## 2021-04-01 ENCOUNTER — TELEPHONE (OUTPATIENT)
Dept: EMERGENCY MEDICINE | Facility: HOSPITAL | Age: 66
End: 2021-04-01

## 2021-04-01 LAB — HCV AB SERPL QL IA: NEGATIVE

## 2021-04-02 ENCOUNTER — NURSE TRIAGE (OUTPATIENT)
Dept: ADMINISTRATIVE | Facility: CLINIC | Age: 66
End: 2021-04-02

## 2021-04-02 ENCOUNTER — HOSPITAL ENCOUNTER (EMERGENCY)
Facility: HOSPITAL | Age: 66
Discharge: HOME | End: 2021-04-02
Attending: EMERGENCY MEDICINE
Payer: MEDICARE

## 2021-04-02 VITALS
TEMPERATURE: 98 F | HEIGHT: 77 IN | SYSTOLIC BLOOD PRESSURE: 109 MMHG | WEIGHT: 285 LBS | HEART RATE: 66 BPM | RESPIRATION RATE: 18 BRPM | DIASTOLIC BLOOD PRESSURE: 64 MMHG | OXYGEN SATURATION: 100 % | BODY MASS INDEX: 33.65 KG/M2

## 2021-04-02 DIAGNOSIS — Z51.89 WOUND CHECK, ABSCESS: Primary | ICD-10-CM

## 2021-04-02 DIAGNOSIS — I50.9 CHF (CONGESTIVE HEART FAILURE): ICD-10-CM

## 2021-04-02 LAB
ALBUMIN SERPL-MCNC: 3.7 G/DL (ref 3.3–5.5)
ALP SERPL-CCNC: 49 U/L (ref 42–141)
BILIRUB SERPL-MCNC: 0.8 MG/DL (ref 0.2–1.6)
BILIRUBIN, POC UA: NEGATIVE
BLOOD, POC UA: NEGATIVE
BUN SERPL-MCNC: 15 MG/DL (ref 7–22)
CALCIUM SERPL-MCNC: 9.2 MG/DL (ref 8–10.3)
CHLORIDE SERPL-SCNC: 105 MMOL/L (ref 98–108)
CLARITY, POC UA: CLEAR
COLOR, POC UA: YELLOW
CREAT SERPL-MCNC: 1.3 MG/DL (ref 0.6–1.2)
GLUCOSE SERPL-MCNC: 98 MG/DL (ref 73–118)
GLUCOSE, POC UA: NEGATIVE
KETONES, POC UA: NEGATIVE
LEUKOCYTE EST, POC UA: NEGATIVE
NITRITE, POC UA: NEGATIVE
PH UR STRIP: 5.5 [PH]
POC ALT (SGPT): 13 U/L (ref 10–47)
POC AST (SGOT): 30 U/L (ref 11–38)
POC B-TYPE NATRIURETIC PEPTIDE: 203 PG/ML (ref 0–100)
POC CARDIAC TROPONIN I: 0.01 NG/ML
POC PTINR: 1 (ref 0.9–1.2)
POC PTWBT: 11.8 SEC (ref 9.7–14.3)
POC TCO2: 29 MMOL/L (ref 18–33)
POTASSIUM BLD-SCNC: 4.9 MMOL/L (ref 3.6–5.1)
PROTEIN, POC UA: NEGATIVE
PROTEIN, POC: 7.6 G/DL (ref 6.4–8.1)
SAMPLE: NORMAL
SAMPLE: NORMAL
SODIUM BLD-SCNC: 139 MMOL/L (ref 128–145)
SPECIFIC GRAVITY, POC UA: 1.02
UROBILINOGEN, POC UA: 0.2 E.U./DL

## 2021-04-02 PROCEDURE — 93010 EKG 12-LEAD: ICD-10-PCS | Mod: ,,, | Performed by: INTERNAL MEDICINE

## 2021-04-02 PROCEDURE — 25000003 PHARM REV CODE 250: Mod: ER | Performed by: EMERGENCY MEDICINE

## 2021-04-02 PROCEDURE — 99285 EMERGENCY DEPT VISIT HI MDM: CPT | Mod: 25,ER

## 2021-04-02 PROCEDURE — 81003 URINALYSIS AUTO W/O SCOPE: CPT | Mod: ER

## 2021-04-02 PROCEDURE — 80053 COMPREHEN METABOLIC PANEL: CPT | Mod: ER

## 2021-04-02 PROCEDURE — 93010 ELECTROCARDIOGRAM REPORT: CPT | Mod: ,,, | Performed by: INTERNAL MEDICINE

## 2021-04-02 PROCEDURE — 63600175 PHARM REV CODE 636 W HCPCS: Mod: ER | Performed by: EMERGENCY MEDICINE

## 2021-04-02 PROCEDURE — 83880 ASSAY OF NATRIURETIC PEPTIDE: CPT | Mod: ER

## 2021-04-02 PROCEDURE — 93005 ELECTROCARDIOGRAM TRACING: CPT | Mod: ER

## 2021-04-02 PROCEDURE — 85610 PROTHROMBIN TIME: CPT | Mod: ER

## 2021-04-02 PROCEDURE — 85025 COMPLETE CBC W/AUTO DIFF WBC: CPT | Mod: ER

## 2021-04-02 PROCEDURE — 96374 THER/PROPH/DIAG INJ IV PUSH: CPT | Mod: ER

## 2021-04-02 PROCEDURE — 84484 ASSAY OF TROPONIN QUANT: CPT | Mod: ER

## 2021-04-02 RX ORDER — MUPIROCIN 20 MG/G
OINTMENT TOPICAL
Status: COMPLETED | OUTPATIENT
Start: 2021-04-02 | End: 2021-04-02

## 2021-04-02 RX ORDER — MUPIROCIN 20 MG/G
OINTMENT TOPICAL 3 TIMES DAILY
Qty: 1 TUBE | Refills: 0 | Status: SHIPPED | OUTPATIENT
Start: 2021-04-02 | End: 2021-04-12

## 2021-04-02 RX ORDER — FUROSEMIDE 10 MG/ML
80 INJECTION INTRAMUSCULAR; INTRAVENOUS
Status: COMPLETED | OUTPATIENT
Start: 2021-04-02 | End: 2021-04-02

## 2021-04-02 RX ADMIN — FUROSEMIDE 80 MG: 10 INJECTION, SOLUTION INTRAMUSCULAR; INTRAVENOUS at 11:04

## 2021-04-02 RX ADMIN — MUPIROCIN: 20 OINTMENT TOPICAL at 11:04

## 2021-04-06 ENCOUNTER — OFFICE VISIT (OUTPATIENT)
Dept: OPHTHALMOLOGY | Facility: CLINIC | Age: 66
End: 2021-04-06
Payer: MEDICARE

## 2021-04-06 DIAGNOSIS — S02.85XA ORBITAL FRACTURE, CLOSED, INITIAL ENCOUNTER: Primary | ICD-10-CM

## 2021-04-06 DIAGNOSIS — H20.9 TRAUMATIC IRITIS: ICD-10-CM

## 2021-04-06 PROCEDURE — 1126F PR PAIN SEVERITY QUANTIFIED, NO PAIN PRESENT: ICD-10-PCS | Mod: S$GLB,,, | Performed by: OPHTHALMOLOGY

## 2021-04-06 PROCEDURE — 99204 OFFICE O/P NEW MOD 45 MIN: CPT | Mod: S$GLB,,, | Performed by: OPHTHALMOLOGY

## 2021-04-06 PROCEDURE — 1126F AMNT PAIN NOTED NONE PRSNT: CPT | Mod: S$GLB,,, | Performed by: OPHTHALMOLOGY

## 2021-04-06 PROCEDURE — 99204 PR OFFICE/OUTPT VISIT, NEW, LEVL IV, 45-59 MIN: ICD-10-PCS | Mod: S$GLB,,, | Performed by: OPHTHALMOLOGY

## 2021-04-06 PROCEDURE — 1101F PT FALLS ASSESS-DOCD LE1/YR: CPT | Mod: CPTII,S$GLB,, | Performed by: OPHTHALMOLOGY

## 2021-04-06 PROCEDURE — 3288F FALL RISK ASSESSMENT DOCD: CPT | Mod: CPTII,S$GLB,, | Performed by: OPHTHALMOLOGY

## 2021-04-06 PROCEDURE — 99999 PR PBB SHADOW E&M-EST. PATIENT-LVL III: ICD-10-PCS | Mod: PBBFAC,,, | Performed by: OPHTHALMOLOGY

## 2021-04-06 PROCEDURE — 92285 EXTERNAL PHOTOGRAPHY - OU - BOTH EYES: ICD-10-PCS | Mod: S$GLB,,, | Performed by: OPHTHALMOLOGY

## 2021-04-06 PROCEDURE — 99999 PR PBB SHADOW E&M-EST. PATIENT-LVL III: CPT | Mod: PBBFAC,,, | Performed by: OPHTHALMOLOGY

## 2021-04-06 PROCEDURE — 1101F PR PT FALLS ASSESS DOC 0-1 FALLS W/OUT INJ PAST YR: ICD-10-PCS | Mod: CPTII,S$GLB,, | Performed by: OPHTHALMOLOGY

## 2021-04-06 PROCEDURE — 3288F PR FALLS RISK ASSESSMENT DOCUMENTED: ICD-10-PCS | Mod: CPTII,S$GLB,, | Performed by: OPHTHALMOLOGY

## 2021-04-06 PROCEDURE — 92285 EXTERNAL OCULAR PHOTOGRAPHY: CPT | Mod: S$GLB,,, | Performed by: OPHTHALMOLOGY

## 2021-04-06 RX ORDER — METHYLPREDNISOLONE 4 MG/1
TABLET ORAL
Qty: 21 TABLET | Refills: 0 | Status: SHIPPED | OUTPATIENT
Start: 2021-04-06 | End: 2021-06-11 | Stop reason: ALTCHOICE

## 2021-04-08 NOTE — ANESTHESIA PROCEDURE NOTES
Arterial    Diagnosis: AICD infection    Patient location during procedure: done in OR  Procedure start time: 6/17/2020 10:48 AM  Procedure end time: 6/17/2020 10:53 AM    Staffing  Authorizing Provider: Keyshawn Shafer MD  Performing Provider: Keyshawn Shafer MD    Anesthesiologist was present at the time of the procedure.    Preanesthetic Checklist  Completed: patient identified, surgical consent, pre-op evaluation, timeout performed, IV checked, risks and benefits discussed, monitors and equipment checked and anesthesia consent givenArterial  Skin Prep: chlorhexidine gluconate and isopropyl alcohol  Local Infiltration: lidocaine  Orientation: right  Location: radial  Catheter Size: 20 G  Catheter placement by Anatomical landmarks. Heme positive aspiration all ports.Insertion Attempts: 1  Assessment  Dressing: secured with tape and tegaderm  Patient: Tolerated well            
S/P tendon repair  R foot, R elbow

## 2021-04-12 ENCOUNTER — PATIENT MESSAGE (OUTPATIENT)
Dept: FAMILY MEDICINE | Facility: CLINIC | Age: 66
End: 2021-04-12

## 2021-04-12 DIAGNOSIS — E78.49 OTHER HYPERLIPIDEMIA: ICD-10-CM

## 2021-04-13 RX ORDER — SPIRONOLACTONE 25 MG/1
12.5 TABLET ORAL DAILY
Qty: 90 TABLET | Refills: 1 | Status: SHIPPED | OUTPATIENT
Start: 2021-04-13 | End: 2021-09-15 | Stop reason: SDUPTHER

## 2021-04-13 RX ORDER — PRAVASTATIN SODIUM 80 MG/1
80 TABLET ORAL DAILY
Qty: 90 TABLET | Refills: 1 | Status: SHIPPED | OUTPATIENT
Start: 2021-04-13 | End: 2021-10-08

## 2021-05-12 ENCOUNTER — OFFICE VISIT (OUTPATIENT)
Dept: ELECTROPHYSIOLOGY | Facility: CLINIC | Age: 66
End: 2021-05-12
Attending: INTERNAL MEDICINE
Payer: MEDICARE

## 2021-05-12 ENCOUNTER — CLINICAL SUPPORT (OUTPATIENT)
Dept: CARDIOLOGY | Facility: HOSPITAL | Age: 66
End: 2021-05-12
Attending: INTERNAL MEDICINE
Payer: MEDICARE

## 2021-05-12 ENCOUNTER — HOSPITAL ENCOUNTER (OUTPATIENT)
Dept: CARDIOLOGY | Facility: CLINIC | Age: 66
Discharge: HOME | End: 2021-05-12
Attending: INTERNAL MEDICINE
Payer: MEDICARE

## 2021-05-12 VITALS
HEART RATE: 60 BPM | SYSTOLIC BLOOD PRESSURE: 148 MMHG | HEIGHT: 77 IN | BODY MASS INDEX: 33.19 KG/M2 | DIASTOLIC BLOOD PRESSURE: 61 MMHG | WEIGHT: 281.06 LBS

## 2021-05-12 DIAGNOSIS — I50.42 CHRONIC COMBINED SYSTOLIC AND DIASTOLIC CONGESTIVE HEART FAILURE: ICD-10-CM

## 2021-05-12 DIAGNOSIS — I10 ESSENTIAL HYPERTENSION: Chronic | ICD-10-CM

## 2021-05-12 DIAGNOSIS — I42.8 NICM (NONISCHEMIC CARDIOMYOPATHY): Chronic | ICD-10-CM

## 2021-05-12 DIAGNOSIS — Z79.899 LONG TERM CURRENT USE OF AMIODARONE: ICD-10-CM

## 2021-05-12 DIAGNOSIS — I50.9 CONGESTIVE HEART FAILURE, UNSPECIFIED HF CHRONICITY, UNSPECIFIED HEART FAILURE TYPE: ICD-10-CM

## 2021-05-12 DIAGNOSIS — I42.8 NICM (NONISCHEMIC CARDIOMYOPATHY): ICD-10-CM

## 2021-05-12 DIAGNOSIS — E66.01 SEVERE OBESITY (BMI 35.0-39.9) WITH COMORBIDITY: ICD-10-CM

## 2021-05-12 DIAGNOSIS — Z95.810 BIVENTRICULAR ICD (IMPLANTABLE CARDIOVERTER-DEFIBRILLATOR) IN PLACE: Primary | ICD-10-CM

## 2021-05-12 PROCEDURE — 93005 ELECTROCARDIOGRAM TRACING: CPT | Mod: S$GLB,,, | Performed by: INTERNAL MEDICINE

## 2021-05-12 PROCEDURE — 1101F PT FALLS ASSESS-DOCD LE1/YR: CPT | Mod: CPTII,S$GLB,, | Performed by: NURSE PRACTITIONER

## 2021-05-12 PROCEDURE — 1159F MED LIST DOCD IN RCRD: CPT | Mod: S$GLB,,, | Performed by: NURSE PRACTITIONER

## 2021-05-12 PROCEDURE — 99214 PR OFFICE/OUTPT VISIT, EST, LEVL IV, 30-39 MIN: ICD-10-PCS | Mod: S$GLB,,, | Performed by: NURSE PRACTITIONER

## 2021-05-12 PROCEDURE — 93284 PRGRMG EVAL IMPLANTABLE DFB: CPT | Mod: 26,,, | Performed by: INTERNAL MEDICINE

## 2021-05-12 PROCEDURE — 93284 CARDIAC DEVICE CHECK - IN CLINIC & HOSPITAL: ICD-10-PCS | Mod: 26,,, | Performed by: INTERNAL MEDICINE

## 2021-05-12 PROCEDURE — 93284 PRGRMG EVAL IMPLANTABLE DFB: CPT

## 2021-05-12 PROCEDURE — 99999 PR PBB SHADOW E&M-EST. PATIENT-LVL IV: CPT | Mod: PBBFAC,,, | Performed by: NURSE PRACTITIONER

## 2021-05-12 PROCEDURE — 3008F PR BODY MASS INDEX (BMI) DOCUMENTED: ICD-10-PCS | Mod: CPTII,S$GLB,, | Performed by: NURSE PRACTITIONER

## 2021-05-12 PROCEDURE — 3288F PR FALLS RISK ASSESSMENT DOCUMENTED: ICD-10-PCS | Mod: CPTII,S$GLB,, | Performed by: NURSE PRACTITIONER

## 2021-05-12 PROCEDURE — 99214 OFFICE O/P EST MOD 30 MIN: CPT | Mod: S$GLB,,, | Performed by: NURSE PRACTITIONER

## 2021-05-12 PROCEDURE — 93010 RHYTHM STRIP: ICD-10-PCS | Mod: S$GLB,,, | Performed by: INTERNAL MEDICINE

## 2021-05-12 PROCEDURE — 93005 RHYTHM STRIP: ICD-10-PCS | Mod: S$GLB,,, | Performed by: INTERNAL MEDICINE

## 2021-05-12 PROCEDURE — 1126F AMNT PAIN NOTED NONE PRSNT: CPT | Mod: S$GLB,,, | Performed by: NURSE PRACTITIONER

## 2021-05-12 PROCEDURE — 3008F BODY MASS INDEX DOCD: CPT | Mod: CPTII,S$GLB,, | Performed by: NURSE PRACTITIONER

## 2021-05-12 PROCEDURE — 1159F PR MEDICATION LIST DOCUMENTED IN MEDICAL RECORD: ICD-10-PCS | Mod: S$GLB,,, | Performed by: NURSE PRACTITIONER

## 2021-05-12 PROCEDURE — 99999 PR PBB SHADOW E&M-EST. PATIENT-LVL IV: ICD-10-PCS | Mod: PBBFAC,,, | Performed by: NURSE PRACTITIONER

## 2021-05-12 PROCEDURE — 99499 RISK ADDL DX/OHS AUDIT: ICD-10-PCS | Mod: S$GLB,,, | Performed by: NURSE PRACTITIONER

## 2021-05-12 PROCEDURE — 3288F FALL RISK ASSESSMENT DOCD: CPT | Mod: CPTII,S$GLB,, | Performed by: NURSE PRACTITIONER

## 2021-05-12 PROCEDURE — 93010 ELECTROCARDIOGRAM REPORT: CPT | Mod: S$GLB,,, | Performed by: INTERNAL MEDICINE

## 2021-05-12 PROCEDURE — 1101F PR PT FALLS ASSESS DOC 0-1 FALLS W/OUT INJ PAST YR: ICD-10-PCS | Mod: CPTII,S$GLB,, | Performed by: NURSE PRACTITIONER

## 2021-05-12 PROCEDURE — 99499 UNLISTED E&M SERVICE: CPT | Mod: S$GLB,,, | Performed by: NURSE PRACTITIONER

## 2021-05-12 PROCEDURE — 1126F PR PAIN SEVERITY QUANTIFIED, NO PAIN PRESENT: ICD-10-PCS | Mod: S$GLB,,, | Performed by: NURSE PRACTITIONER

## 2021-06-11 ENCOUNTER — OFFICE VISIT (OUTPATIENT)
Dept: FAMILY MEDICINE | Facility: CLINIC | Age: 66
End: 2021-06-11
Payer: MEDICARE

## 2021-06-11 VITALS
HEIGHT: 77 IN | DIASTOLIC BLOOD PRESSURE: 60 MMHG | SYSTOLIC BLOOD PRESSURE: 110 MMHG | BODY MASS INDEX: 33.73 KG/M2 | RESPIRATION RATE: 16 BRPM | TEMPERATURE: 98 F | WEIGHT: 285.69 LBS | OXYGEN SATURATION: 97 % | HEART RATE: 60 BPM

## 2021-06-11 DIAGNOSIS — S02.32XD CLOSED FRACTURE OF LEFT ORBITAL FLOOR WITH ROUTINE HEALING, SUBSEQUENT ENCOUNTER: ICD-10-CM

## 2021-06-11 DIAGNOSIS — M25.512 CHRONIC LEFT SHOULDER PAIN: ICD-10-CM

## 2021-06-11 DIAGNOSIS — I50.42 CHRONIC COMBINED SYSTOLIC AND DIASTOLIC CONGESTIVE HEART FAILURE: Primary | ICD-10-CM

## 2021-06-11 DIAGNOSIS — Z95.810 BIVENTRICULAR ICD (IMPLANTABLE CARDIOVERTER-DEFIBRILLATOR) IN PLACE: ICD-10-CM

## 2021-06-11 DIAGNOSIS — G89.29 CHRONIC LEFT SHOULDER PAIN: ICD-10-CM

## 2021-06-11 DIAGNOSIS — R52 PAINFUL VEINS: ICD-10-CM

## 2021-06-11 DIAGNOSIS — I10 ESSENTIAL HYPERTENSION: Chronic | ICD-10-CM

## 2021-06-11 DIAGNOSIS — R09.89 PAINFUL VEINS: ICD-10-CM

## 2021-06-11 PROBLEM — T82.7XXA INFECTION OF PACEMAKER POCKET: Status: RESOLVED | Noted: 2020-06-15 | Resolved: 2021-06-11

## 2021-06-11 PROCEDURE — 1159F PR MEDICATION LIST DOCUMENTED IN MEDICAL RECORD: ICD-10-PCS | Mod: S$GLB,,, | Performed by: FAMILY MEDICINE

## 2021-06-11 PROCEDURE — 99999 PR PBB SHADOW E&M-EST. PATIENT-LVL IV: ICD-10-PCS | Mod: PBBFAC,,, | Performed by: FAMILY MEDICINE

## 2021-06-11 PROCEDURE — 1125F AMNT PAIN NOTED PAIN PRSNT: CPT | Mod: S$GLB,,, | Performed by: FAMILY MEDICINE

## 2021-06-11 PROCEDURE — 1125F PR PAIN SEVERITY QUANTIFIED, PAIN PRESENT: ICD-10-PCS | Mod: S$GLB,,, | Performed by: FAMILY MEDICINE

## 2021-06-11 PROCEDURE — 3078F PR MOST RECENT DIASTOLIC BLOOD PRESSURE < 80 MM HG: ICD-10-PCS | Mod: CPTII,S$GLB,, | Performed by: FAMILY MEDICINE

## 2021-06-11 PROCEDURE — 3078F DIAST BP <80 MM HG: CPT | Mod: CPTII,S$GLB,, | Performed by: FAMILY MEDICINE

## 2021-06-11 PROCEDURE — 99214 OFFICE O/P EST MOD 30 MIN: CPT | Mod: S$GLB,,, | Performed by: FAMILY MEDICINE

## 2021-06-11 PROCEDURE — 3074F PR MOST RECENT SYSTOLIC BLOOD PRESSURE < 130 MM HG: ICD-10-PCS | Mod: CPTII,S$GLB,, | Performed by: FAMILY MEDICINE

## 2021-06-11 PROCEDURE — 99214 PR OFFICE/OUTPT VISIT, EST, LEVL IV, 30-39 MIN: ICD-10-PCS | Mod: S$GLB,,, | Performed by: FAMILY MEDICINE

## 2021-06-11 PROCEDURE — 3074F SYST BP LT 130 MM HG: CPT | Mod: CPTII,S$GLB,, | Performed by: FAMILY MEDICINE

## 2021-06-11 PROCEDURE — 3288F FALL RISK ASSESSMENT DOCD: CPT | Mod: CPTII,S$GLB,, | Performed by: FAMILY MEDICINE

## 2021-06-11 PROCEDURE — 1100F PR PT FALLS ASSESS DOC 2+ FALLS/FALL W/INJURY/YR: ICD-10-PCS | Mod: CPTII,S$GLB,, | Performed by: FAMILY MEDICINE

## 2021-06-11 PROCEDURE — 1100F PTFALLS ASSESS-DOCD GE2>/YR: CPT | Mod: CPTII,S$GLB,, | Performed by: FAMILY MEDICINE

## 2021-06-11 PROCEDURE — 3008F PR BODY MASS INDEX (BMI) DOCUMENTED: ICD-10-PCS | Mod: CPTII,S$GLB,, | Performed by: FAMILY MEDICINE

## 2021-06-11 PROCEDURE — 3288F PR FALLS RISK ASSESSMENT DOCUMENTED: ICD-10-PCS | Mod: CPTII,S$GLB,, | Performed by: FAMILY MEDICINE

## 2021-06-11 PROCEDURE — 1159F MED LIST DOCD IN RCRD: CPT | Mod: S$GLB,,, | Performed by: FAMILY MEDICINE

## 2021-06-11 PROCEDURE — 99999 PR PBB SHADOW E&M-EST. PATIENT-LVL IV: CPT | Mod: PBBFAC,,, | Performed by: FAMILY MEDICINE

## 2021-06-11 PROCEDURE — 3008F BODY MASS INDEX DOCD: CPT | Mod: CPTII,S$GLB,, | Performed by: FAMILY MEDICINE

## 2021-06-11 RX ORDER — DOXYCYCLINE 100 MG/1
100 CAPSULE ORAL 2 TIMES DAILY
Qty: 20 CAPSULE | Refills: 0 | Status: SHIPPED | OUTPATIENT
Start: 2021-06-11 | End: 2021-06-21

## 2021-06-24 ENCOUNTER — CLINICAL SUPPORT (OUTPATIENT)
Dept: CARDIOLOGY | Facility: HOSPITAL | Age: 66
End: 2021-06-24
Payer: MEDICARE

## 2021-06-24 DIAGNOSIS — Z95.810 PRESENCE OF AUTOMATIC (IMPLANTABLE) CARDIAC DEFIBRILLATOR: ICD-10-CM

## 2021-06-24 PROCEDURE — 93295 DEV INTERROG REMOTE 1/2/MLT: CPT | Mod: ,,, | Performed by: INTERNAL MEDICINE

## 2021-06-24 PROCEDURE — 93296 REM INTERROG EVL PM/IDS: CPT | Performed by: INTERNAL MEDICINE

## 2021-06-24 PROCEDURE — 93295 CARDIAC DEVICE CHECK - REMOTE: ICD-10-PCS | Mod: ,,, | Performed by: INTERNAL MEDICINE

## 2021-06-25 NOTE — PT/OT/SLP PROGRESS
"Physical Therapy  Consult    Patient Name:  Papito Bhakta   MRN:  [id number]  Admitting Diagnosis:  Acute on chronic combined systolic and diastolic heart failure   Recent Surgery: Procedure(s) (LRB):  INSERTION, ICD, BIVENTRICULAR (N/A) 2 Days Post-Op  Admit Date: 9/28/2020  Length of Stay: 0 days    Physical Therapy orders received and acknowledged. PT attempted evaluation on this date at 1323.     Upon entrance into room PT explained reason for consult, role of PT in the hospital, and PT POC. Pt stated understanding and PT was able to obtain the following home information:    Pt lives with daughter and grandchild in a [facility] with 0 SHIRA. Pt has a tub/shower with grab bar and bath bench. Patient owns RW and WC but does not utilize. Pt reports he was independent with all activities and ADLs prior to admit.    After obtaining home information PT attempted to initiate OOB mobility for continued PT evaluation and assessment of discharge dispo. Patient adamantly declined despite max encouragement from therapist citing fatigue and frustration with constant visits from various members of medical team. Patient told therapists, " I just ate and I wanted to rest and now y'all come in here." PT continued to encourage patient to mobilize and pt continued to adamantly decline any mobility including supine <> sit or rolling.    Before leaving room pt was repositioned in bed as well as educated extensively on pacemaker precautions as well as how to properly don sling and swathe. Pt also educated on appropriate wearing schedule. Pt stated understanding.     PT to attempt when Papito Bhakta is agreeable to progressive mobility and PT evaluation.    Ana Palacios, PT, DPT  9/30/2020   Pager: [phone]    " No significant past surgical history

## 2021-09-15 ENCOUNTER — OFFICE VISIT (OUTPATIENT)
Dept: FAMILY MEDICINE | Facility: CLINIC | Age: 66
End: 2021-09-15
Payer: MEDICARE

## 2021-09-15 ENCOUNTER — LAB VISIT (OUTPATIENT)
Dept: LAB | Facility: HOSPITAL | Age: 66
End: 2021-09-15
Attending: FAMILY MEDICINE
Payer: MEDICARE

## 2021-09-15 VITALS
HEIGHT: 77 IN | OXYGEN SATURATION: 97 % | WEIGHT: 291.88 LBS | HEART RATE: 64 BPM | SYSTOLIC BLOOD PRESSURE: 112 MMHG | TEMPERATURE: 98 F | BODY MASS INDEX: 34.46 KG/M2 | DIASTOLIC BLOOD PRESSURE: 62 MMHG

## 2021-09-15 DIAGNOSIS — Z87.891 PERSONAL HISTORY OF NICOTINE DEPENDENCE: ICD-10-CM

## 2021-09-15 DIAGNOSIS — I10 ESSENTIAL HYPERTENSION: Chronic | ICD-10-CM

## 2021-09-15 DIAGNOSIS — Z95.810 BIVENTRICULAR ICD (IMPLANTABLE CARDIOVERTER-DEFIBRILLATOR) IN PLACE: ICD-10-CM

## 2021-09-15 DIAGNOSIS — I10 ESSENTIAL HYPERTENSION: Primary | Chronic | ICD-10-CM

## 2021-09-15 DIAGNOSIS — Z79.899 LONG TERM CURRENT USE OF AMIODARONE: ICD-10-CM

## 2021-09-15 DIAGNOSIS — I50.42 CHRONIC COMBINED SYSTOLIC AND DIASTOLIC CONGESTIVE HEART FAILURE: ICD-10-CM

## 2021-09-15 LAB
ANION GAP SERPL CALC-SCNC: 9 MMOL/L (ref 8–16)
BUN SERPL-MCNC: 15 MG/DL (ref 8–23)
CALCIUM SERPL-MCNC: 9.4 MG/DL (ref 8.7–10.5)
CHLORIDE SERPL-SCNC: 103 MMOL/L (ref 95–110)
CO2 SERPL-SCNC: 25 MMOL/L (ref 23–29)
CREAT SERPL-MCNC: 1.2 MG/DL (ref 0.5–1.4)
EST. GFR  (AFRICAN AMERICAN): >60 ML/MIN/1.73 M^2
EST. GFR  (NON AFRICAN AMERICAN): >60 ML/MIN/1.73 M^2
GLUCOSE SERPL-MCNC: 90 MG/DL (ref 70–110)
POTASSIUM SERPL-SCNC: 4.5 MMOL/L (ref 3.5–5.1)
SODIUM SERPL-SCNC: 137 MMOL/L (ref 136–145)

## 2021-09-15 PROCEDURE — 99214 OFFICE O/P EST MOD 30 MIN: CPT | Mod: S$GLB,,, | Performed by: FAMILY MEDICINE

## 2021-09-15 PROCEDURE — 3288F FALL RISK ASSESSMENT DOCD: CPT | Mod: CPTII,S$GLB,, | Performed by: FAMILY MEDICINE

## 2021-09-15 PROCEDURE — 80048 BASIC METABOLIC PNL TOTAL CA: CPT | Performed by: FAMILY MEDICINE

## 2021-09-15 PROCEDURE — 3288F PR FALLS RISK ASSESSMENT DOCUMENTED: ICD-10-PCS | Mod: CPTII,S$GLB,, | Performed by: FAMILY MEDICINE

## 2021-09-15 PROCEDURE — 3078F DIAST BP <80 MM HG: CPT | Mod: CPTII,S$GLB,, | Performed by: FAMILY MEDICINE

## 2021-09-15 PROCEDURE — 3074F PR MOST RECENT SYSTOLIC BLOOD PRESSURE < 130 MM HG: ICD-10-PCS | Mod: CPTII,S$GLB,, | Performed by: FAMILY MEDICINE

## 2021-09-15 PROCEDURE — 3078F PR MOST RECENT DIASTOLIC BLOOD PRESSURE < 80 MM HG: ICD-10-PCS | Mod: CPTII,S$GLB,, | Performed by: FAMILY MEDICINE

## 2021-09-15 PROCEDURE — 36415 COLL VENOUS BLD VENIPUNCTURE: CPT | Mod: PO | Performed by: FAMILY MEDICINE

## 2021-09-15 PROCEDURE — 1101F PT FALLS ASSESS-DOCD LE1/YR: CPT | Mod: CPTII,S$GLB,, | Performed by: FAMILY MEDICINE

## 2021-09-15 PROCEDURE — 4010F PR ACE/ARB THEARPY RXD/TAKEN: ICD-10-PCS | Mod: CPTII,S$GLB,, | Performed by: FAMILY MEDICINE

## 2021-09-15 PROCEDURE — 99214 PR OFFICE/OUTPT VISIT, EST, LEVL IV, 30-39 MIN: ICD-10-PCS | Mod: S$GLB,,, | Performed by: FAMILY MEDICINE

## 2021-09-15 PROCEDURE — 1101F PR PT FALLS ASSESS DOC 0-1 FALLS W/OUT INJ PAST YR: ICD-10-PCS | Mod: CPTII,S$GLB,, | Performed by: FAMILY MEDICINE

## 2021-09-15 PROCEDURE — 1125F AMNT PAIN NOTED PAIN PRSNT: CPT | Mod: CPTII,S$GLB,, | Performed by: FAMILY MEDICINE

## 2021-09-15 PROCEDURE — 4010F ACE/ARB THERAPY RXD/TAKEN: CPT | Mod: CPTII,S$GLB,, | Performed by: FAMILY MEDICINE

## 2021-09-15 PROCEDURE — 3008F PR BODY MASS INDEX (BMI) DOCUMENTED: ICD-10-PCS | Mod: CPTII,S$GLB,, | Performed by: FAMILY MEDICINE

## 2021-09-15 PROCEDURE — 3008F BODY MASS INDEX DOCD: CPT | Mod: CPTII,S$GLB,, | Performed by: FAMILY MEDICINE

## 2021-09-15 PROCEDURE — 1159F MED LIST DOCD IN RCRD: CPT | Mod: CPTII,S$GLB,, | Performed by: FAMILY MEDICINE

## 2021-09-15 PROCEDURE — 1125F PR PAIN SEVERITY QUANTIFIED, PAIN PRESENT: ICD-10-PCS | Mod: CPTII,S$GLB,, | Performed by: FAMILY MEDICINE

## 2021-09-15 PROCEDURE — 99499 UNLISTED E&M SERVICE: CPT | Mod: S$GLB,,, | Performed by: FAMILY MEDICINE

## 2021-09-15 PROCEDURE — 99999 PR PBB SHADOW E&M-EST. PATIENT-LVL IV: ICD-10-PCS | Mod: PBBFAC,,, | Performed by: FAMILY MEDICINE

## 2021-09-15 PROCEDURE — 99999 PR PBB SHADOW E&M-EST. PATIENT-LVL IV: CPT | Mod: PBBFAC,,, | Performed by: FAMILY MEDICINE

## 2021-09-15 PROCEDURE — 1159F PR MEDICATION LIST DOCUMENTED IN MEDICAL RECORD: ICD-10-PCS | Mod: CPTII,S$GLB,, | Performed by: FAMILY MEDICINE

## 2021-09-15 PROCEDURE — 99499 RISK ADDL DX/OHS AUDIT: ICD-10-PCS | Mod: S$GLB,,, | Performed by: FAMILY MEDICINE

## 2021-09-15 PROCEDURE — 3074F SYST BP LT 130 MM HG: CPT | Mod: CPTII,S$GLB,, | Performed by: FAMILY MEDICINE

## 2021-09-15 RX ORDER — ASPIRIN 325 MG
325 TABLET, DELAYED RELEASE (ENTERIC COATED) ORAL DAILY
Qty: 90 TABLET | Refills: 3 | Status: SHIPPED | OUTPATIENT
Start: 2021-09-15

## 2021-09-15 RX ORDER — METOPROLOL SUCCINATE 50 MG/1
50 TABLET, EXTENDED RELEASE ORAL DAILY
Qty: 90 TABLET | Refills: 1 | Status: SHIPPED | OUTPATIENT
Start: 2021-09-15 | End: 2022-02-23

## 2021-09-15 RX ORDER — SPIRONOLACTONE 25 MG/1
12.5 TABLET ORAL DAILY
Qty: 90 TABLET | Refills: 1 | Status: SHIPPED | OUTPATIENT
Start: 2021-09-15 | End: 2022-07-15 | Stop reason: SDUPTHER

## 2021-09-17 ENCOUNTER — TELEPHONE (OUTPATIENT)
Dept: ELECTROPHYSIOLOGY | Facility: CLINIC | Age: 66
End: 2021-09-17

## 2021-09-17 ENCOUNTER — PATIENT MESSAGE (OUTPATIENT)
Dept: FAMILY MEDICINE | Facility: CLINIC | Age: 66
End: 2021-09-17

## 2021-09-22 ENCOUNTER — CLINICAL SUPPORT (OUTPATIENT)
Dept: CARDIOLOGY | Facility: HOSPITAL | Age: 66
End: 2021-09-22
Payer: MEDICARE

## 2021-09-22 DIAGNOSIS — Z95.810 PRESENCE OF AUTOMATIC (IMPLANTABLE) CARDIAC DEFIBRILLATOR: ICD-10-CM

## 2021-09-22 PROCEDURE — 93296 REM INTERROG EVL PM/IDS: CPT | Performed by: INTERNAL MEDICINE

## 2021-09-22 PROCEDURE — 93295 DEV INTERROG REMOTE 1/2/MLT: CPT | Mod: ,,, | Performed by: INTERNAL MEDICINE

## 2021-09-22 PROCEDURE — 93295 CARDIAC DEVICE CHECK - REMOTE: ICD-10-PCS | Mod: ,,, | Performed by: INTERNAL MEDICINE

## 2021-09-23 ENCOUNTER — HOSPITAL ENCOUNTER (OUTPATIENT)
Dept: RADIOLOGY | Facility: HOSPITAL | Age: 66
Discharge: HOME | End: 2021-09-23
Attending: FAMILY MEDICINE
Payer: MEDICARE

## 2021-09-23 DIAGNOSIS — Z87.891 PERSONAL HISTORY OF NICOTINE DEPENDENCE: ICD-10-CM

## 2021-09-23 PROCEDURE — 71271 CT THORAX LUNG CANCER SCR C-: CPT | Mod: TC

## 2021-09-23 PROCEDURE — 71271 CT THORAX LUNG CANCER SCR C-: CPT | Mod: 26,,, | Performed by: RADIOLOGY

## 2021-09-23 PROCEDURE — 71271 CT CHEST LUNG SCREENING LOW DOSE: ICD-10-PCS | Mod: 26,,, | Performed by: RADIOLOGY

## 2021-09-23 NOTE — CONSULTS
"[facility]  Infectious Disease  Consult Note    Patient Name: Papito Bhakta  MRN: [id number]  Admission Date: 6/15/2020  Hospital Length of Stay: 1 days  Attending Physician: Carlos Medrano  Primary Care Provider: Brynn To MD     Isolation Status: No active isolations    Patient information was obtained from patient and ER records.      Consults  Assessment/Plan:     * Erosion of pacemaker pocket due to and not concurrent with implantation of cardiac pacemaker  - ICD infection given erosion through skin  - no surrounding or systemic signs of infection  - no on abx      Plan:  - hold abx as no active signs of infection - though, can start abx for fever or signs of infection  - ICD and lead removal recommended  - during removal, please send cultures of pocket and leads for culture  - immediately post extraction, start empiric vancomycin and ceftriaxone  - will follow      Thank you for your consult. I will follow-up with patient. Please contact us if you have any additional questions.    YASH Marie  Infectious Disease  [facility]    Subjective:     Principal Problem: Erosion of pacemaker pocket due to and not concurrent with implantation of cardiac pacemaker    HPI: Papito Bhakta is a 65 y.o. male with a significant medical history of CHF, HLD, HTN, s/p AICD placement who presents to the hospital as a transfer from [facility] for evaluation of AICD/pacemaker protrusion through skin.  The patient was in his usual state of health and states he had been cutting grass earlier in the day when he noted a "sticking" sensation near his pacemaker.  He subsequently took his shirt off and noted the device protruding through his skin prompting him to present to the OSH for evaluation.  The patient denies any pain, numbness, weakness, fever, CP, SOB, or device discharges.  He did not report anything that exacerbated or alleviated his symptom.  He was transferred to " [facility] for higher level of care.  AICD device was easily seen protruding through the skin of the patient's left chest wall.  No drainage or erythema noted.  The patient was admitted to Hospital Medicine.    Interval History:  ID consulted for ABX recs.  Patient has been afebrile and WBC WNL.  CRP WNL and procalcitonin 0.04. Blood cultures NGTD.  The patient denies any recent fever, chills, or sweats.  He is to undergo removal 6/17.  He is not currently on abx.           Past Medical History:   Diagnosis Date    Anticoagulant long-term use     Aspirin    CHF (congestive heart failure)     Hyperlipidemia     Hypertension     NICM (nonischemic cardiomyopathy) 6/16/2020    Obesity        Past Surgical History:   Procedure Laterality Date    INSERTION OF BIVENTRICULAR IMPLANTABLE CARDIOVERTER-DEFIBRILLATOR (ICD) N/A 2/13/2019    Procedure: INSERTION, ICD, BIVENTRICULAR;  Surgeon: Shailesh Eng MD;  Location: [facility] CATH LAB;  Service: Cardiology;  Laterality: N/A;  RN PRE OP 2-6-19  1ST CASE PER  RADHA. NOTIFIED RADHA THAT ANESTHESIA IS NOT PITO FOR 1ST CASE START-LO    oral extraction  11/2018    TESTICLE SURGERY         Review of patient's allergies indicates:  No Known Allergies    Medications:  Medications Prior to Admission   Medication Sig    aspirin 325 MG tablet Take 325 mg by mouth once daily.    carvediloL (COREG) 25 MG tablet Take 1 tablet (25 mg total) by mouth 2 (two) times daily.    pravastatin (PRAVACHOL) 80 MG tablet Take 1 tablet (80 mg total) by mouth once daily.    ramipril (ALTACE) 10 MG capsule Take 1 capsule (10 mg total) by mouth once daily.    spironolactone (ALDACTONE) 25 MG tablet Take 1 tablet (25 mg total) by mouth once daily.    diclofenac sodium (VOLTAREN) 1 % Gel Apply 2 g topically 4 (four) times daily.    furosemide (LASIX) 80 MG tablet Take 1 tablet (80 mg total) by mouth once daily.     Antibiotics (From admission, onward)    None        Antifungals (From  admission, onward)    None        Antivirals (From admission, onward)    None           Immunization History   Administered Date(s) Administered    Influenza 10/30/2017    Influenza - Quadrivalent - PF (6 months and older) 2018, 10/16/2019    Pneumococcal Polysaccharide - 23 Valent 2018    Td (ADULT) 2018    Td - PF (ADULT) 2018       Family History     Problem Relation (Age of Onset)    Epilepsy Mother        Social History     Socioeconomic History    Marital status:      Spouse name: Not on file    Number of children: Not on file    Years of education: Not on file    Highest education level: Not on file   Occupational History    Not on file   Social Needs    Financial resource strain: Not on file    Food insecurity     Worry: Not on file     Inability: Not on file    Transportation needs     Medical: Not on file     Non-medical: Not on file   Tobacco Use    Smoking status: Former Smoker     Packs/day: 0.50     Years: 40.00     Pack years: 20.00     Types: Cigarettes, Cigars     Quit date:      Years since quittin.4    Smokeless tobacco: Never Used   Substance and Sexual Activity    Alcohol use: Yes     Comment: once a month    Drug use: No    Sexual activity: Yes     Birth control/protection: None     Comment: uses protection sometimes   Lifestyle    Physical activity     Days per week: Not on file     Minutes per session: Not on file    Stress: Not on file   Relationships    Social connections     Talks on phone: Not on file     Gets together: Not on file     Attends Yazidism service: Not on file     Active member of club or organization: Not on file     Attends meetings of clubs or organizations: Not on file     Relationship status: Not on file   Other Topics Concern    Not on file   Social History Narrative    Not on file     Review of Systems   Constitutional: Negative for appetite change, chills, diaphoresis, fatigue, fever and unexpected weight  home change.   HENT: Negative for congestion, ear pain, sore throat and tinnitus.    Eyes: Negative for pain, redness and visual disturbance.   Respiratory: Negative for cough, shortness of breath and wheezing.    Cardiovascular: Negative for chest pain, palpitations and leg swelling.   Gastrointestinal: Negative for abdominal pain, constipation, diarrhea, rectal pain and vomiting.   Endocrine: Negative for cold intolerance and heat intolerance.   Genitourinary: Negative for dysuria, flank pain, frequency, hematuria and urgency.   Musculoskeletal: Negative for arthralgias, back pain, myalgias and neck pain.   Skin: Positive for wound. Negative for rash.   Allergic/Immunologic: Negative for immunocompromised state.   Neurological: Negative for dizziness, light-headedness, numbness and headaches.   Hematological: Negative for adenopathy. Does not bruise/bleed easily.   Psychiatric/Behavioral: Negative for confusion and sleep disturbance. The patient is not nervous/anxious.      Objective:     Vital Signs (Most Recent):  Temp: 97.9 °F (36.6 °C) (06/16/20 0737)  Pulse: 60 (06/16/20 0737)  Resp: 17 (06/16/20 0737)  BP: 120/81 (06/16/20 0737)  SpO2: 97 % (06/16/20 0737) Vital Signs (24h Range):  Temp:  [97.5 °F (36.4 °C)-98 °F (36.7 °C)] 97.9 °F (36.6 °C)  Pulse:  [59-69] 60  Resp:  [16-18] 17  SpO2:  [96 %-99 %] 97 %  BP: (109-137)/(58-85) 120/81     Weight: (!) 145.8 kg (321 lb 6.4 oz)  Body mass index is 38.11 kg/m².    Estimated Creatinine Clearance: 83.2 mL/min (based on SCr of 1.4 mg/dL).    Physical Exam  Constitutional:       General: He is not in acute distress.     Appearance: He is well-developed. He is not diaphoretic.       HENT:      Head: Normocephalic and atraumatic.   Cardiovascular:      Rate and Rhythm: Normal rate and regular rhythm.      Heart sounds: Normal heart sounds. No murmur. No friction rub. No gallop.    Pulmonary:      Effort: Pulmonary effort is normal. No respiratory distress.      Breath  sounds: Normal breath sounds. No wheezing or rales.   Abdominal:      General: Bowel sounds are normal. There is no distension.      Palpations: Abdomen is soft. There is no mass.      Tenderness: There is no abdominal tenderness. There is no guarding or rebound.   Skin:     General: Skin is warm and dry.   Neurological:      Mental Status: He is alert and oriented to person, place, and time.   Psychiatric:         Behavior: Behavior normal.                 Significant Labs:   Blood Culture:   Recent Labs   Lab 06/16/20  0031   LABBLOO No Growth to date  No Growth to date     CBC:   Recent Labs   Lab 06/16/20  0029   WBC 5.90   HGB 12.0*   HCT 41.2        CMP:   Recent Labs   Lab 06/16/20  0029      K 3.9      CO2 27      BUN 22   CREATININE 1.4   CALCIUM 9.0   PROT 7.1   ALBUMIN 3.4*   BILITOT 0.6   ALKPHOS 61   AST 16   ALT 9*   ANIONGAP 10   EGFRNONAA 52.4*     Wound Culture: No results for input(s): LABAERO in the last 4320 hours.    Significant Imaging: I have reviewed all pertinent imaging results/findings within the past 24 hours. None

## 2021-10-08 ENCOUNTER — TELEPHONE (OUTPATIENT)
Dept: FAMILY MEDICINE | Facility: CLINIC | Age: 66
End: 2021-10-08

## 2021-10-08 DIAGNOSIS — R91.8 LUNG MASS: Primary | ICD-10-CM

## 2021-10-08 DIAGNOSIS — E78.49 OTHER HYPERLIPIDEMIA: ICD-10-CM

## 2021-10-08 RX ORDER — PRAVASTATIN SODIUM 80 MG/1
TABLET ORAL
Qty: 90 TABLET | Refills: 3 | Status: SHIPPED | OUTPATIENT
Start: 2021-10-08 | End: 2023-02-04

## 2021-10-15 ENCOUNTER — CLINICAL SUPPORT (OUTPATIENT)
Dept: FAMILY MEDICINE | Facility: CLINIC | Age: 66
End: 2021-10-15
Payer: MEDICARE

## 2021-10-15 DIAGNOSIS — Z23 FLU VACCINE NEED: Primary | ICD-10-CM

## 2021-10-30 NOTE — PROCEDURES
"Debridement    Date/Time: 7/8/2020 3:00 PM  Performed by: Carmen Curiel NP  Authorized by: Carmen Curiel NP     Time out: Immediately prior to procedure a "time out" was called to verify the correct patient, procedure, equipment, support staff and site/side marked as required.    Consent Done?:  Yes (Verbal)  Local anesthesia used?: No      Debridement - 1st Wound - General Location: left chest wall.    Type of Debridement:  Excisional       Length (cm):  1.8       Area (sq cm):  0.9       Width (cm):  0.5       Percent Debrided (%):  100       Depth (cm):  0.1       Total Area Debrided (sq cm):  0.9    Depth of debridement:  Subcutaneous tissue    Tissue debrided:  Dermis, Epidermis and Subcutaneous    Devitalized tissue debrided:  Biofilm, Exudate, Fibrin and Other (suture material)    Instruments:  Curette, Forceps and Scissors    Bleeding:  Minimal  Hemostasis Achieved: Yes    Method Used:  Pressure  Patient tolerance:  Patient tolerated the procedure well with no immediate complications      "
Yes

## 2021-12-09 ENCOUNTER — OFFICE VISIT (OUTPATIENT)
Dept: PULMONOLOGY | Facility: CLINIC | Age: 66
End: 2021-12-09
Payer: MEDICARE

## 2021-12-09 VITALS
HEIGHT: 77 IN | TEMPERATURE: 98 F | OXYGEN SATURATION: 96 % | HEART RATE: 60 BPM | WEIGHT: 296.19 LBS | SYSTOLIC BLOOD PRESSURE: 113 MMHG | BODY MASS INDEX: 34.97 KG/M2 | DIASTOLIC BLOOD PRESSURE: 61 MMHG

## 2021-12-09 DIAGNOSIS — J43.2 CENTRILOBULAR EMPHYSEMA: ICD-10-CM

## 2021-12-09 DIAGNOSIS — Z71.89 ADVANCE CARE PLANNING: ICD-10-CM

## 2021-12-09 DIAGNOSIS — R91.8 LUNG MASS: ICD-10-CM

## 2021-12-09 DIAGNOSIS — R91.1 PULMONARY NODULE: ICD-10-CM

## 2021-12-09 DIAGNOSIS — I42.8 NICM (NONISCHEMIC CARDIOMYOPATHY): Chronic | ICD-10-CM

## 2021-12-09 PROBLEM — J44.9 COPD (CHRONIC OBSTRUCTIVE PULMONARY DISEASE): Status: ACTIVE | Noted: 2021-12-09

## 2021-12-09 PROCEDURE — 1157F ADVNC CARE PLAN IN RCRD: CPT | Mod: ,,, | Performed by: INTERNAL MEDICINE

## 2021-12-09 PROCEDURE — 99999 PR PBB SHADOW E&M-EST. PATIENT-LVL V: ICD-10-PCS | Mod: PBBFAC,,, | Performed by: INTERNAL MEDICINE

## 2021-12-09 PROCEDURE — 99497 ADVNCD CARE PLAN 30 MIN: CPT | Mod: S$PBB,,, | Performed by: INTERNAL MEDICINE

## 2021-12-09 PROCEDURE — 99499 UNLISTED E&M SERVICE: CPT | Mod: S$GLB,,, | Performed by: INTERNAL MEDICINE

## 2021-12-09 PROCEDURE — 99497 PR ADVNCD CARE PLAN 30 MIN: ICD-10-PCS | Mod: S$PBB,,, | Performed by: INTERNAL MEDICINE

## 2021-12-09 PROCEDURE — 99214 OFFICE O/P EST MOD 30 MIN: CPT | Mod: S$PBB,,, | Performed by: INTERNAL MEDICINE

## 2021-12-09 PROCEDURE — 99999 PR PBB SHADOW E&M-EST. PATIENT-LVL V: CPT | Mod: PBBFAC,,, | Performed by: INTERNAL MEDICINE

## 2021-12-09 PROCEDURE — 99214 PR OFFICE/OUTPT VISIT, EST, LEVL IV, 30-39 MIN: ICD-10-PCS | Mod: S$PBB,,, | Performed by: INTERNAL MEDICINE

## 2021-12-09 PROCEDURE — 99499 RISK ADDL DX/OHS AUDIT: ICD-10-PCS | Mod: S$GLB,,, | Performed by: INTERNAL MEDICINE

## 2021-12-09 PROCEDURE — 1157F PR ADVANCE CARE PLAN OR EQUIV PRESENT IN MEDICAL RECORD: ICD-10-PCS | Mod: ,,, | Performed by: INTERNAL MEDICINE

## 2021-12-15 ENCOUNTER — HOSPITAL ENCOUNTER (OUTPATIENT)
Dept: RESPIRATORY THERAPY | Facility: HOSPITAL | Age: 66
Discharge: HOME | End: 2021-12-15
Attending: INTERNAL MEDICINE
Payer: MEDICARE

## 2021-12-15 VITALS — RESPIRATION RATE: 20 BRPM | OXYGEN SATURATION: 99 % | HEART RATE: 60 BPM

## 2021-12-15 DIAGNOSIS — R91.1 PULMONARY NODULE: ICD-10-CM

## 2021-12-15 PROCEDURE — 94727 PR PULM FUNCTION TEST BY GAS: ICD-10-PCS | Mod: 26,,, | Performed by: INTERNAL MEDICINE

## 2021-12-15 PROCEDURE — 94727 GAS DIL/WSHOT DETER LNG VOL: CPT

## 2021-12-15 PROCEDURE — 25000242 PHARM REV CODE 250 ALT 637 W/ HCPCS: Performed by: INTERNAL MEDICINE

## 2021-12-15 PROCEDURE — 94729 PR C02/MEMBANE DIFFUSE CAPACITY: ICD-10-PCS | Mod: 26,,, | Performed by: INTERNAL MEDICINE

## 2021-12-15 PROCEDURE — 94010 BREATHING CAPACITY TEST: CPT

## 2021-12-15 PROCEDURE — 94729 DIFFUSING CAPACITY: CPT

## 2021-12-15 PROCEDURE — 94060 EVALUATION OF WHEEZING: CPT | Mod: 26,,, | Performed by: INTERNAL MEDICINE

## 2021-12-15 PROCEDURE — 94729 DIFFUSING CAPACITY: CPT | Mod: 26,,, | Performed by: INTERNAL MEDICINE

## 2021-12-15 PROCEDURE — 94060 PR EVAL OF BRONCHOSPASM: ICD-10-PCS | Mod: 26,,, | Performed by: INTERNAL MEDICINE

## 2021-12-15 PROCEDURE — 94727 GAS DIL/WSHOT DETER LNG VOL: CPT | Mod: 26,,, | Performed by: INTERNAL MEDICINE

## 2021-12-15 RX ORDER — ALBUTEROL SULFATE 2.5 MG/.5ML
2.5 SOLUTION RESPIRATORY (INHALATION) ONCE
Status: COMPLETED | OUTPATIENT
Start: 2021-12-15 | End: 2021-12-15

## 2021-12-15 RX ADMIN — ALBUTEROL SULFATE 2.5 MG: 2.5 SOLUTION RESPIRATORY (INHALATION) at 09:12

## 2021-12-17 LAB
BRPFT: NORMAL
DLCO ADJ PRE: 23.84 ML/(MIN*MMHG)
DLCO SINGLE BREATH LLN: 26.75
DLCO SINGLE BREATH PRE REF: 70.8 %
DLCO SINGLE BREATH REF: 33.68
DLCOC SBVA LLN: 2.98
DLCOC SBVA PRE REF: 92.1 %
DLCOC SBVA REF: 3.96
DLCOC SINGLE BREATH LLN: 26.75
DLCOC SINGLE BREATH PRE REF: 70.8 %
DLCOC SINGLE BREATH REF: 33.68
DLCOVA LLN: 2.98
DLCOVA PRE REF: 92.1 %
DLCOVA PRE: 3.65 ML/(MIN*MMHG*L)
DLCOVA REF: 3.96
DLVAADJ PRE: 3.65 ML/(MIN*MMHG*L)
ERVN2 LLN: -16448.71
ERVN2 PRE REF: 85 %
ERVN2 PRE: 1.1 L
ERVN2 REF: 1.29
FEF 25 75 CHG: -3.2 %
FEF 25 75 LLN: 1.01
FEF 25 75 POST REF: 96.8 %
FEF 25 75 PRE REF: 100 %
FEF 25 75 REF: 2.66
FET100 CHG: 8.1 %
FEV1 CHG: 14.5 %
FEV1 FVC CHG: 11.9 %
FEV1 FVC LLN: 64
FEV1 FVC POST REF: 107.7 %
FEV1 FVC PRE REF: 96.2 %
FEV1 FVC REF: 76
FEV1 LLN: 2.45
FEV1 POST REF: 101.9 %
FEV1 PRE REF: 89 %
FEV1 REF: 3.5
FRCN2 LLN: 3.08
FRCN2 PRE REF: 70.8 %
FRCN2 REF: 4.07
FVC CHG: 2.3 %
FVC LLN: 3.37
FVC POST REF: 94.1 %
FVC PRE REF: 92 %
FVC REF: 4.63
IVC PRE: 4.49 L
IVC SINGLE BREATH LLN: 3.37
IVC SINGLE BREATH PRE REF: 97 %
IVC SINGLE BREATH REF: 4.63
PEF CHG: -3.9 %
PEF LLN: 6.73
PEF POST REF: 72.8 %
PEF PRE REF: 75.7 %
PEF REF: 9.92
POST FEF 25 75: 2.58 L/S
POST FET 100: 8.7 SEC
POST FEV1 FVC: 81.82 %
POST FEV1: 3.56 L
POST FVC: 4.36 L
POST PEF: 7.22 L/S
PRE DLCO: 23.84 ML/(MIN*MMHG)
PRE FEF 25 75: 2.67 L/S
PRE FET 100: 8.05 SEC
PRE FEV1 FVC: 73.11 %
PRE FEV1: 3.11 L
PRE FRC N2: 2.88 L
PRE FVC: 4.26 L
PRE PEF: 7.51 L/S
RVN2 LLN: 2.1
RVN2 PRE REF: 45.4 %
RVN2 PRE: 1.26 L
RVN2 REF: 2.78
RVN2TLCN2 LLN: 30.72
RVN2TLCN2 PRE REF: 55.4 %
RVN2TLCN2 PRE: 21.99 %
RVN2TLCN2 REF: 39.7
TLCN2 LLN: 7.35
TLCN2 PRE REF: 67.4 %
TLCN2 PRE: 5.73 L
TLCN2 REF: 8.5
VA PRE: 6.53 L
VA SINGLE BREATH LLN: 8.35
VA SINGLE BREATH PRE REF: 78.2 %
VA SINGLE BREATH REF: 8.35
VCMAXN2 LLN: 3.37
VCMAXN2 PRE REF: 96.6 %
VCMAXN2 PRE: 4.47 L
VCMAXN2 REF: 4.63

## 2021-12-21 ENCOUNTER — CLINICAL SUPPORT (OUTPATIENT)
Dept: CARDIOLOGY | Facility: HOSPITAL | Age: 66
End: 2021-12-21
Payer: MEDICARE

## 2021-12-21 DIAGNOSIS — Z95.810 PRESENCE OF AUTOMATIC (IMPLANTABLE) CARDIAC DEFIBRILLATOR: ICD-10-CM

## 2021-12-21 DIAGNOSIS — I42.9 CARDIOMYOPATHY, UNSPECIFIED: ICD-10-CM

## 2021-12-21 PROCEDURE — 93295 CARDIAC DEVICE CHECK - REMOTE: ICD-10-PCS | Mod: ,,, | Performed by: INTERNAL MEDICINE

## 2021-12-21 PROCEDURE — 93296 REM INTERROG EVL PM/IDS: CPT | Performed by: INTERNAL MEDICINE

## 2021-12-21 PROCEDURE — 93295 DEV INTERROG REMOTE 1/2/MLT: CPT | Mod: ,,, | Performed by: INTERNAL MEDICINE

## 2022-01-03 ENCOUNTER — HOSPITAL ENCOUNTER (OUTPATIENT)
Dept: RADIOLOGY | Facility: HOSPITAL | Age: 67
Discharge: HOME | End: 2022-01-03
Attending: INTERNAL MEDICINE
Payer: MEDICARE

## 2022-01-03 DIAGNOSIS — R91.1 PULMONARY NODULE: ICD-10-CM

## 2022-01-03 PROCEDURE — 71250 CT THORAX DX C-: CPT | Mod: 26,,, | Performed by: RADIOLOGY

## 2022-01-03 PROCEDURE — 71250 CT CHEST WITHOUT CONTRAST: ICD-10-PCS | Mod: 26,,, | Performed by: RADIOLOGY

## 2022-01-03 PROCEDURE — 71250 CT THORAX DX C-: CPT | Mod: TC

## 2022-01-07 ENCOUNTER — OFFICE VISIT (OUTPATIENT)
Dept: PULMONOLOGY | Facility: CLINIC | Age: 67
End: 2022-01-07
Payer: MEDICARE

## 2022-01-07 VITALS
BODY MASS INDEX: 34.26 KG/M2 | DIASTOLIC BLOOD PRESSURE: 66 MMHG | HEART RATE: 70 BPM | WEIGHT: 290.13 LBS | TEMPERATURE: 97 F | HEIGHT: 77 IN | SYSTOLIC BLOOD PRESSURE: 111 MMHG | OXYGEN SATURATION: 97 %

## 2022-01-07 DIAGNOSIS — R91.1 PULMONARY NODULE: ICD-10-CM

## 2022-01-07 DIAGNOSIS — Z71.89 ADVANCE CARE PLANNING: ICD-10-CM

## 2022-01-07 DIAGNOSIS — G47.33 OSA (OBSTRUCTIVE SLEEP APNEA): ICD-10-CM

## 2022-01-07 DIAGNOSIS — I42.8 NICM (NONISCHEMIC CARDIOMYOPATHY): Chronic | ICD-10-CM

## 2022-01-07 PROBLEM — J44.9 COPD (CHRONIC OBSTRUCTIVE PULMONARY DISEASE): Status: RESOLVED | Noted: 2021-12-09 | Resolved: 2022-01-07

## 2022-01-07 PROCEDURE — 1101F PR PT FALLS ASSESS DOC 0-1 FALLS W/OUT INJ PAST YR: ICD-10-PCS | Mod: CPTII,S$GLB,, | Performed by: INTERNAL MEDICINE

## 2022-01-07 PROCEDURE — 99499 RISK ADDL DX/OHS AUDIT: ICD-10-PCS | Mod: S$GLB,,, | Performed by: FAMILY MEDICINE

## 2022-01-07 PROCEDURE — 3008F BODY MASS INDEX DOCD: CPT | Mod: CPTII,S$GLB,, | Performed by: INTERNAL MEDICINE

## 2022-01-07 PROCEDURE — 1101F PT FALLS ASSESS-DOCD LE1/YR: CPT | Mod: CPTII,S$GLB,, | Performed by: INTERNAL MEDICINE

## 2022-01-07 PROCEDURE — 99999 PR PBB SHADOW E&M-EST. PATIENT-LVL IV: CPT | Mod: PBBFAC,,, | Performed by: INTERNAL MEDICINE

## 2022-01-07 PROCEDURE — 1126F PR PAIN SEVERITY QUANTIFIED, NO PAIN PRESENT: ICD-10-PCS | Mod: CPTII,S$GLB,, | Performed by: INTERNAL MEDICINE

## 2022-01-07 PROCEDURE — 99999 PR PBB SHADOW E&M-EST. PATIENT-LVL IV: ICD-10-PCS | Mod: PBBFAC,,, | Performed by: INTERNAL MEDICINE

## 2022-01-07 PROCEDURE — 3288F PR FALLS RISK ASSESSMENT DOCUMENTED: ICD-10-PCS | Mod: CPTII,S$GLB,, | Performed by: INTERNAL MEDICINE

## 2022-01-07 PROCEDURE — 99499 UNLISTED E&M SERVICE: CPT | Mod: S$GLB,,, | Performed by: FAMILY MEDICINE

## 2022-01-07 PROCEDURE — 3008F PR BODY MASS INDEX (BMI) DOCUMENTED: ICD-10-PCS | Mod: CPTII,S$GLB,, | Performed by: INTERNAL MEDICINE

## 2022-01-07 PROCEDURE — 99499 RISK ADDL DX/OHS AUDIT: ICD-10-PCS | Mod: S$GLB,,, | Performed by: INTERNAL MEDICINE

## 2022-01-07 PROCEDURE — 3288F FALL RISK ASSESSMENT DOCD: CPT | Mod: CPTII,S$GLB,, | Performed by: INTERNAL MEDICINE

## 2022-01-07 PROCEDURE — 3078F DIAST BP <80 MM HG: CPT | Mod: CPTII,S$GLB,, | Performed by: INTERNAL MEDICINE

## 2022-01-07 PROCEDURE — 1159F MED LIST DOCD IN RCRD: CPT | Mod: CPTII,S$GLB,, | Performed by: INTERNAL MEDICINE

## 2022-01-07 PROCEDURE — 3074F SYST BP LT 130 MM HG: CPT | Mod: CPTII,S$GLB,, | Performed by: INTERNAL MEDICINE

## 2022-01-07 PROCEDURE — 1126F AMNT PAIN NOTED NONE PRSNT: CPT | Mod: CPTII,S$GLB,, | Performed by: INTERNAL MEDICINE

## 2022-01-07 PROCEDURE — 3078F PR MOST RECENT DIASTOLIC BLOOD PRESSURE < 80 MM HG: ICD-10-PCS | Mod: CPTII,S$GLB,, | Performed by: INTERNAL MEDICINE

## 2022-01-07 PROCEDURE — 99214 OFFICE O/P EST MOD 30 MIN: CPT | Mod: S$GLB,,, | Performed by: INTERNAL MEDICINE

## 2022-01-07 PROCEDURE — 99214 PR OFFICE/OUTPT VISIT, EST, LEVL IV, 30-39 MIN: ICD-10-PCS | Mod: S$GLB,,, | Performed by: INTERNAL MEDICINE

## 2022-01-07 PROCEDURE — 3074F PR MOST RECENT SYSTOLIC BLOOD PRESSURE < 130 MM HG: ICD-10-PCS | Mod: CPTII,S$GLB,, | Performed by: INTERNAL MEDICINE

## 2022-01-07 PROCEDURE — 99499 UNLISTED E&M SERVICE: CPT | Mod: S$GLB,,, | Performed by: INTERNAL MEDICINE

## 2022-01-07 PROCEDURE — 1159F PR MEDICATION LIST DOCUMENTED IN MEDICAL RECORD: ICD-10-PCS | Mod: CPTII,S$GLB,, | Performed by: INTERNAL MEDICINE

## 2022-01-07 NOTE — PROGRESS NOTES
Papito Bhakta  was seen as a follow up.    CHIEF COMPLAINT:  Results      HISTORY OF PRESENT ILLNESS: Papito Bhakta is a 66 y.o. male  has a past medical history of RIGOBERTO (acute kidney injury) (10/7/2020), Anticoagulant long-term use, CHF (congestive heart failure), Hyperlipidemia, Hypertension, NICM (nonischemic cardiomyopathy) (6/16/2020), Obesity, AMELIA (obstructive sleep apnea) (1/7/2022), and Peripheral vascular disease, unspecified (2/1/2021).  Patient used to smoke 1/2 ppd x 40 years.  Quit 2010.  Patient underwent ldct 9/23/21 for lung cancer screening.  Ct demonstrated rul cluster of nodules.  Patient was referred for further inputs.      Patient denied coughing or wheezing.  No chest pain.  Chronic castellanos x 1 block.  +h/o pacemaker implantation 2/13/2019, extraction 6/17/2020, reimplantation 9/28/2020.  Patient is walker dependent since 2020 due to leg weakness and balance issue. No hemoptysis.  No weight loss.  No fever/chill.  Chronic amiodarone 1/2021 for VT.  No h/o tb or tb exposure.  Retire  x 20 years.      PAST MEDICAL HISTORY:    Active Ambulatory Problems     Diagnosis Date Noted    Other hyperlipidemia 12/01/2017    Essential hypertension 12/01/2017    Chronic combined systolic and diastolic congestive heart failure 12/01/2017    Hyperlipidemia 01/05/2019    Severe obesity (BMI 35.0-39.9) with comorbidity 05/01/2019    Biventricular ICD (implantable cardioverter-defibrillator) in place 05/14/2019    Chronic left shoulder pain 10/24/2019    Decreased range of motion of left shoulder 10/24/2019    Erosion of pacemaker pocket due to and not concurrent with implantation of cardiac pacemaker 06/15/2020    NICM (nonischemic cardiomyopathy) 06/16/2020    Thrombocytopenia, unspecified 02/01/2021    Peripheral vascular disease, unspecified 02/01/2021    Pulmonary nodule 12/09/2021    Advance care planning 12/09/2021    AMELIA (obstructive sleep apnea) 01/07/2022     Resolved Ambulatory  Problems     Diagnosis Date Noted    BMI 40.0-44.9, adult 03/05/2018    Acute on chronic congestive heart failure 11/04/2018    Acute on chronic combined systolic and diastolic congestive heart failure 01/05/2019    Acute pulmonary edema 01/05/2019    Acute hypoxemic respiratory failure 01/05/2019    Encephalopathy, metabolic 01/06/2019    CAP (community acquired pneumonia) 01/06/2019    Shock 01/07/2019    Chronic systolic congestive heart failure 02/13/2019    Decreased strength of upper extremity 10/24/2019    Congestive heart failure 11/12/2019    Infection of pacemaker pocket 06/15/2020    Infection of biventricular implantable cardioverter-defibrillator (ICD) 06/16/2020    Acute on chronic combined systolic and diastolic heart failure 09/30/2020    Severe sepsis 10/06/2020    RIGOBERTO (acute kidney injury) 10/07/2020    Hematuria 10/10/2020    Hypervolemia 11/18/2020    Hypervolemia 11/20/2020    COPD (chronic obstructive pulmonary disease) 12/09/2021     Past Medical History:   Diagnosis Date    Anticoagulant long-term use     CHF (congestive heart failure)     Hypertension     Obesity                 PAST SURGICAL HISTORY:    Past Surgical History:   Procedure Laterality Date    INSERTION OF BIVENTRICULAR IMPLANTABLE CARDIOVERTER-DEFIBRILLATOR (ICD) N/A 2/13/2019    Procedure: INSERTION, ICD, BIVENTRICULAR;  Surgeon: Shailesh Eng MD;  Location: [facility] CATH LAB;  Service: Cardiology;  Laterality: N/A;  RN PRE OP 2-6-19  1ST CASE PER  RADHA. NOTIFIED RADHA THAT ANESTHESIA IS NOT PITO FOR 1ST CASE START-LO    INSERTION OF BIVENTRICULAR IMPLANTABLE CARDIOVERTER-DEFIBRILLATOR (ICD) N/A 9/28/2020    Procedure: INSERTION, ICD, BIVENTRICULAR;  Surgeon: Jim Kwong MD;  Location: [facility] EP LAB;  Service: Cardiology;  Laterality: N/A;  NICM, CRT-D, SJM,, MAC, DM, 3 Prep*Wearing LifeVest*    oral extraction  11/2018    TESTICLE SURGERY           FAMILY HISTORY:                Family History    Problem Relation Age of Onset    Epilepsy Mother        SOCIAL HISTORY:          Tobacco:   Social History     Tobacco Use   Smoking Status Former Smoker    Packs/day: 0.50    Years: 40.00    Pack years: 20.00    Types: Cigarettes, Cigars    Quit date:     Years since quittin.0   Smokeless Tobacco Never Used     alcohol use:    Social History     Substance and Sexual Activity   Alcohol Use Yes    Comment: once a month               Occupation:  Former     ALLERGIES:  Review of patient's allergies indicates:  No Known Allergies    CURRENT MEDICATIONS:    Current Outpatient Medications   Medication Sig Dispense Refill    amiodarone (PACERONE) 200 MG Tab TAKE 1 TABLET EVERY DAY 90 tablet 1    aspirin (ECOTRIN) 325 MG EC tablet Take 1 tablet (325 mg total) by mouth once daily. 90 tablet 3    furosemide (LASIX) 80 MG tablet One tablet twice a day every other day 180 tablet 1    potassium chloride (K-TAB) 20 mEq Take 1 tablet (20 mEq total) by mouth once daily. 90 tablet 3    pravastatin (PRAVACHOL) 80 MG tablet TAKE 1 TABLET EVERY DAY 90 tablet 3    sacubitril/valsartan (ENTRESTO ORAL) Take 1 tablet by mouth once daily. Mg unknown, did not bring.      spironolactone (ALDACTONE) 25 MG tablet Take 0.5 tablets (12.5 mg total) by mouth once daily. Hold if SBP < 110 90 tablet 1    gabapentin (NEURONTIN) 100 MG capsule Take 1 capsule (100 mg total) by mouth 3 (three) times daily. Take 100 mg by mouth 3 (three) times daily. 270 capsule 1    metoprolol succinate (TOPROL-XL) 50 MG 24 hr tablet Take 1 tablet (50 mg total) by mouth once daily. 90 tablet 1     No current facility-administered medications for this visit.                  REVIEW OF SYSTEMS:     Pulmonary related symptoms as per HPI.  Gen:  no weight loss, no fever, no night sweat  HEENT:  no visual changes, no sore throat, no hearing loss  CV:  No chest pain, no orthopnea, no PND  GI:  no melena, no hematochezia, no diarhea, no  "constipation.  :  no dysuria, no hematuria, no hesistancy, no dribbling  Neuro:  no syncope, no vertigo, no tinitus  Psych:  No homocide or suicide ideation; no depression.  Endocrine:  No heat or cold intolerance.  Sleep:  + snoring; no witnessed apnea.  Otherwise, a balance of systems reviewed is negative.          PHYSICAL EXAM:  Vitals:    01/07/22 0921   BP: 111/66   Pulse: 70   Temp: 97.3 °F (36.3 °C)   SpO2: 97%   Weight: 131.6 kg (290 lb 2 oz)   Height: 6' 5" (1.956 m)   PainSc: 0-No pain     Body mass index is 34.4 kg/m².     GENERAL:  well develop; no apparent distress  HEENT:  no nasal congestion; no discharge noted; class 3 modified mallampatti.   NECK:  supple; no palpable masses.  CARDIO: regular rate and rhythm  PULM:  clear to auscultation bilaterally; no intercostals retractions; no accessory muscle usage   ABDOMEN:  soft nontender/nondistended.  +bowel sound  EXTREMITIES no cc; +edema  NEURO:  CN II-XII intact.  5/5 motor in all extremities.  sensation grossly intact   to light touch.  PSYCH:  normal affect.  Alert and oriented x 4    LABS  Pulmonary Functions Testing Results(personally reviewed):    PFT 12.15/21 Ratio of 73%; FVC 4.26 L (92%); FEV1 3.11 L (89%); TLC 5.73 L (67%); dlco 24 (71%)     ABG (personally reviewed):  none    CT CHEST(personally reviewed):    9/15/21 rul cluster of nodular opacities.  New when compared to cervical ct 3/31/21.  +paraseptal emphysematous changes  1/3/22 resolution of rul infiltrate    JASSON 6/17/21  · Severely decreased left ventricular systolic function. The estimated ejection fraction is 25%.  · Low normal right ventricular systolic function.  · Normal appearing left atrial appendage. No thrombus is present in the appendage.  · RA and RV ICD leads visualized in the posterior SVC along the vessel wall before extraction.  · S/p Successful device and lead extraction. No pericardial effusion before or after procedure. No worsening TR post procedure. Normal SVC " anatomy post procedure. Wire seen in SVC post extraction is an amplatzer wire.  · PISA radius 0.8 cm, EROA 41 mm2, RVOL 53mL, Systolic flow reversal in Left superior pulmonary vein. Severe mitral regurgitation by PISA.  · Moderate tricuspid regurgitation.  · Mild to moderate pulmonic regurgitation.  · Pulmonary hypertension present.        ASSESSMENT/PLAN  Problem List Items Addressed This Visit     Advance care planning    Overview     Advance Care Planning    Goals of care During this visit, I engaged the patient in the advance care planning process.  The patient and I reviewed the role for advance directives and their purpose in directing future healthcare if the patient's unable to speak for him/herself.  At this point in time, the patient does have full decision-making capacity.  We discussed different extreme health states that patient could experience, and reviewed what kind of medical care patient would want in those situations.  The patient communicated that if he were comatose and had little chance of a meaningful recovery, he does not want machines/life-sustaining treatments used.  In addition to the above preference, phe patient  HAS NOT completed a living will to reflect these preferences.      In addition, patient does want CPR or cardioversion in the case of cardiopulmonary arrest.  The patient HAS designated a healthcare power of  to make decisions on his behalf. His daughter, Annie Bhakta, be the decision maker.                  NICM (nonischemic cardiomyopathy) (Chronic)    Overview     EF 10%.  S/p ppm and currently on amiodarone for vt.  Follow by Dr. Mckenzie         AMELIA (obstructive sleep apnea)    Overview     The patient symptomatically has snoring, frequent awakening with findings of chf. This warrants further investigation for possible obstructive sleep apnea.  Patient declined at this time.  Per patient, prior sleep study at [facility] was normal.  Advise patient that his risk for amelia  changes with aging and weight gain.  Aware of cardiovascular morbidities/mortality a/w untreated ani.  Patient will contact patient should he changes his mind.            Pulmonary nodule    Overview     Cluster of nodular infiltrate in rul on 9/15/21 CT.  New when compared to 3/31/21.  resolved per most recent ct 1/3/22 suggesting benign etiology.  pft wnl.  Further work up is not recommended.                 Patient will No follow-ups on file. with md/np.

## 2022-01-31 ENCOUNTER — TELEPHONE (OUTPATIENT)
Dept: FAMILY MEDICINE | Facility: CLINIC | Age: 67
End: 2022-01-31
Payer: MEDICARE

## 2022-01-31 NOTE — TELEPHONE ENCOUNTER
----- Message from Bobbi Luu sent at 1/31/2022  7:05 AM CST -----  Regarding: Patient call back  Who called:JULICOURT SEPINOZA [[id number]]    What is the request in detail: Patient is requesting a call back. Patient states he needs a letter to excuse him for jury duty. He states he requested one earlier this month and never heard back. He would like to further discuss.   Please advise.    Can the clinic reply by [facility]? No    Best call back number: [phone]    Additional Information: N/A

## 2022-01-31 NOTE — LETTER
[facility]  [address]  [place] [address]  Phone: [phone]  Fax: [phone] January 31, 2022    Papito Bhakta  [address] [place] [id number]      To Whom It May Concern:    Papito Bhakta is unable to participate in jury duty due to a medical condition that causes him to have frequent restroom breaks.    If you have any questions or concerns, please feel free to call my office.    Sincerely,          Brynn To MD

## 2022-01-31 NOTE — TELEPHONE ENCOUNTER
Patient main reason not able to attend jury duty to him taking fluid pills and frequent restroom usage and patient states that he is still on crutches.

## 2022-02-14 ENCOUNTER — OFFICE VISIT (OUTPATIENT)
Dept: FAMILY MEDICINE | Facility: CLINIC | Age: 67
End: 2022-02-14
Payer: MEDICARE

## 2022-02-14 VITALS
WEIGHT: 299.69 LBS | OXYGEN SATURATION: 98 % | BODY MASS INDEX: 35.39 KG/M2 | DIASTOLIC BLOOD PRESSURE: 68 MMHG | HEIGHT: 77 IN | TEMPERATURE: 98 F | SYSTOLIC BLOOD PRESSURE: 118 MMHG | HEART RATE: 60 BPM | RESPIRATION RATE: 16 BRPM

## 2022-02-14 DIAGNOSIS — I47.20 VENTRICULAR TACHYCARDIA: ICD-10-CM

## 2022-02-14 DIAGNOSIS — E66.01 SEVERE OBESITY (BMI 35.0-39.9) WITH COMORBIDITY: ICD-10-CM

## 2022-02-14 DIAGNOSIS — I73.9 PERIPHERAL VASCULAR DISEASE, UNSPECIFIED: ICD-10-CM

## 2022-02-14 DIAGNOSIS — R91.1 PULMONARY NODULE: ICD-10-CM

## 2022-02-14 DIAGNOSIS — D69.6 THROMBOCYTOPENIA, UNSPECIFIED: ICD-10-CM

## 2022-02-14 DIAGNOSIS — Z99.89 USES CRUTCHES: ICD-10-CM

## 2022-02-14 DIAGNOSIS — I50.42 CHRONIC COMBINED SYSTOLIC AND DIASTOLIC CONGESTIVE HEART FAILURE: Primary | ICD-10-CM

## 2022-02-14 PROCEDURE — 99214 PR OFFICE/OUTPT VISIT, EST, LEVL IV, 30-39 MIN: ICD-10-PCS | Mod: S$GLB,,, | Performed by: FAMILY MEDICINE

## 2022-02-14 PROCEDURE — 1126F PR PAIN SEVERITY QUANTIFIED, NO PAIN PRESENT: ICD-10-PCS | Mod: CPTII,S$GLB,, | Performed by: FAMILY MEDICINE

## 2022-02-14 PROCEDURE — 3008F PR BODY MASS INDEX (BMI) DOCUMENTED: ICD-10-PCS | Mod: CPTII,S$GLB,, | Performed by: FAMILY MEDICINE

## 2022-02-14 PROCEDURE — 99214 OFFICE O/P EST MOD 30 MIN: CPT | Mod: S$GLB,,, | Performed by: FAMILY MEDICINE

## 2022-02-14 PROCEDURE — 1101F PT FALLS ASSESS-DOCD LE1/YR: CPT | Mod: CPTII,S$GLB,, | Performed by: FAMILY MEDICINE

## 2022-02-14 PROCEDURE — 1101F PR PT FALLS ASSESS DOC 0-1 FALLS W/OUT INJ PAST YR: ICD-10-PCS | Mod: CPTII,S$GLB,, | Performed by: FAMILY MEDICINE

## 2022-02-14 PROCEDURE — 99999 PR PBB SHADOW E&M-EST. PATIENT-LVL IV: ICD-10-PCS | Mod: PBBFAC,,, | Performed by: FAMILY MEDICINE

## 2022-02-14 PROCEDURE — 3074F PR MOST RECENT SYSTOLIC BLOOD PRESSURE < 130 MM HG: ICD-10-PCS | Mod: CPTII,S$GLB,, | Performed by: FAMILY MEDICINE

## 2022-02-14 PROCEDURE — 99499 RISK ADDL DX/OHS AUDIT: ICD-10-PCS | Mod: S$GLB,,, | Performed by: FAMILY MEDICINE

## 2022-02-14 PROCEDURE — 3078F PR MOST RECENT DIASTOLIC BLOOD PRESSURE < 80 MM HG: ICD-10-PCS | Mod: CPTII,S$GLB,, | Performed by: FAMILY MEDICINE

## 2022-02-14 PROCEDURE — 1159F PR MEDICATION LIST DOCUMENTED IN MEDICAL RECORD: ICD-10-PCS | Mod: CPTII,S$GLB,, | Performed by: FAMILY MEDICINE

## 2022-02-14 PROCEDURE — 1159F MED LIST DOCD IN RCRD: CPT | Mod: CPTII,S$GLB,, | Performed by: FAMILY MEDICINE

## 2022-02-14 PROCEDURE — 3288F FALL RISK ASSESSMENT DOCD: CPT | Mod: CPTII,S$GLB,, | Performed by: FAMILY MEDICINE

## 2022-02-14 PROCEDURE — 3008F BODY MASS INDEX DOCD: CPT | Mod: CPTII,S$GLB,, | Performed by: FAMILY MEDICINE

## 2022-02-14 PROCEDURE — 3078F DIAST BP <80 MM HG: CPT | Mod: CPTII,S$GLB,, | Performed by: FAMILY MEDICINE

## 2022-02-14 PROCEDURE — 1126F AMNT PAIN NOTED NONE PRSNT: CPT | Mod: CPTII,S$GLB,, | Performed by: FAMILY MEDICINE

## 2022-02-14 PROCEDURE — 3074F SYST BP LT 130 MM HG: CPT | Mod: CPTII,S$GLB,, | Performed by: FAMILY MEDICINE

## 2022-02-14 PROCEDURE — 99999 PR PBB SHADOW E&M-EST. PATIENT-LVL IV: CPT | Mod: PBBFAC,,, | Performed by: FAMILY MEDICINE

## 2022-02-14 PROCEDURE — 99499 UNLISTED E&M SERVICE: CPT | Mod: S$GLB,,, | Performed by: FAMILY MEDICINE

## 2022-02-14 PROCEDURE — 3288F PR FALLS RISK ASSESSMENT DOCUMENTED: ICD-10-PCS | Mod: CPTII,S$GLB,, | Performed by: FAMILY MEDICINE

## 2022-02-14 NOTE — PROGRESS NOTES
Chief Complaint   Patient presents with    Follow-up    Congestive Heart Failure       HPI  Papito Bhakta is a 66 y.o. male with a past medical history in the problem list  below     Patient Active Problem List   Diagnosis    Other hyperlipidemia    Essential hypertension    Chronic combined systolic and diastolic congestive heart failure    Hyperlipidemia    Severe obesity (BMI 35.0-39.9) with comorbidity    Biventricular ICD (implantable cardioverter-defibrillator) in place    Chronic left shoulder pain    Decreased range of motion of left shoulder    Erosion of pacemaker pocket due to and not concurrent with implantation of cardiac pacemaker    NICM (nonischemic cardiomyopathy)    Thrombocytopenia, unspecified    Peripheral vascular disease, unspecified    Pulmonary nodule    Advance care planning    AMELIA (obstructive sleep apnea)    Ventricular tachycardia       Presenting for follow-up for congestive heart failure  He has not had any recent falls, has also been maintaining his weight  He is due for follow up with cardiology  He has been recommended a sleep study for frequent waking which he attributes to his fluid pills; we discussed that his nighttime waking may improve with tx of sleep apnea      ROS  Review of Systems   Constitutional: Negative for chills, fatigue, fever and unexpected weight change.   HENT: Negative for ear pain, postnasal drip, rhinorrhea, sinus pressure and sore throat.    Eyes: Negative for photophobia and visual disturbance.   Respiratory: Positive for shortness of breath. Negative for apnea, cough, chest tightness and wheezing.    Cardiovascular: Positive for leg swelling. Negative for chest pain and palpitations.   Gastrointestinal: Negative for abdominal pain, blood in stool, constipation, diarrhea, nausea and vomiting.   Genitourinary: Negative for difficulty urinating.   Musculoskeletal: Negative for arthralgias and joint swelling.   Skin: Negative for rash.  "  Neurological: Negative for facial asymmetry, speech difficulty, weakness, numbness and headaches.   Psychiatric/Behavioral: Negative for dysphoric mood.       Physical Exam  Vitals:    02/14/22 0816   BP: 118/68   Pulse: 60   Resp: 16   Temp: 98 °F (36.7 °C)   TempSrc: Oral   SpO2: 98%   Weight: 136 kg (299 lb 11.5 oz)   Height: 6' 5" (1.956 m)    Body mass index is 35.54 kg/m².  Weight: 136 kg (299 lb 11.5 oz)   Height: 6' 5" (195.6 cm)     Physical Exam  Vitals and nursing note reviewed.   Constitutional:       Appearance: He is well-developed and well-nourished.   HENT:      Head: Normocephalic and atraumatic.      Mouth/Throat:      Pharynx: No oropharyngeal exudate.   Eyes:      Extraocular Movements: EOM normal.   Cardiovascular:      Rate and Rhythm: Normal rate and regular rhythm.      Heart sounds: Normal heart sounds. No murmur heard.  No friction rub. No gallop.    Pulmonary:      Effort: Pulmonary effort is normal. No respiratory distress.      Breath sounds: Normal breath sounds. No wheezing or rales.   Chest:      Chest wall: No tenderness.   Lymphadenopathy:      Cervical: No cervical adenopathy.   Skin:     General: Skin is warm and dry.   Neurological:      Mental Status: He is alert and oriented to person, place, and time.   Psychiatric:         Mood and Affect: Mood and affect normal.         Behavior: Behavior normal.         ALLERGIES AND MEDICATIONS: updated and reviewed.  Review of patient's allergies indicates:  No Known Allergies  Medication List with Changes/Refills   Current Medications    AMIODARONE (PACERONE) 200 MG TAB    TAKE 1 TABLET EVERY DAY    ASPIRIN (ECOTRIN) 325 MG EC TABLET    Take 1 tablet (325 mg total) by mouth once daily.    FUROSEMIDE (LASIX) 80 MG TABLET    One tablet twice a day every other day    GABAPENTIN (NEURONTIN) 100 MG CAPSULE    Take 1 capsule (100 mg total) by mouth 3 (three) times daily. Take 100 mg by mouth 3 (three) times daily.    METOPROLOL SUCCINATE " (TOPROL-XL) 50 MG 24 HR TABLET    Take 1 tablet (50 mg total) by mouth once daily.    POTASSIUM CHLORIDE (K-TAB) 20 MEQ    Take 1 tablet (20 mEq total) by mouth once daily.    PRAVASTATIN (PRAVACHOL) 80 MG TABLET    TAKE 1 TABLET EVERY DAY    SACUBITRIL/VALSARTAN (ENTRESTO ORAL)    Take 1 tablet by mouth once daily. Mg unknown, did not bring.    SPIRONOLACTONE (ALDACTONE) 25 MG TABLET    Take 0.5 tablets (12.5 mg total) by mouth once daily. Hold if SBP < 110       Assessment & Plan  1. Chronic combined systolic and diastolic congestive heart failure    2. Pulmonary nodule    3. Thrombocytopenia, unspecified    4. Ventricular tachycardia    5. Severe obesity (BMI 35.0-39.9) with comorbidity    6. Peripheral vascular disease, unspecified    7. Uses crutches        Problem List Items Addressed This Visit        Pulmonary    Pulmonary nodule    Overview     Cluster of nodular infiltrate in rul on 9/15/21 CT.  New when compared to 3/31/21.  resolved per most recent ct 1/3/22 suggesting benign etiology.  pft wnl.  Further work up is not recommended.              Cardiac/Vascular    Chronic combined systolic and diastolic congestive heart failure - Primary  Recommend sleep study as his CHF may improve  He is not sure he wants it at this time    Peripheral vascular disease, unspecified  Maintaining weight     Ventricular tachycardia  Due for f/u with cardiology  Has pace maker       Hematology    Thrombocytopenia, unspecified  Stable, monitor with routine labs       Endocrine    Severe obesity (BMI 35.0-39.9) with comorbidity  Encourage weight loss through physical activity and dietary modification      Other Visit Diagnoses     Uses crutches      Has some weakness with ambulation          Follow up in about 5 months (around 7/14/2022) for management of chronic conditions.    Other Orders Placed This Visit:  No orders of the defined types were placed in this encounter.

## 2022-02-22 DIAGNOSIS — I10 ESSENTIAL HYPERTENSION: Chronic | ICD-10-CM

## 2022-02-22 DIAGNOSIS — I49.8 OTHER SPECIFIED CARDIAC ARRHYTHMIAS: Primary | ICD-10-CM

## 2022-02-22 RX ORDER — AMIODARONE HYDROCHLORIDE 200 MG/1
TABLET ORAL
Qty: 90 TABLET | Refills: 0 | Status: SHIPPED | OUTPATIENT
Start: 2022-02-22 | End: 2022-11-23 | Stop reason: SDUPTHER

## 2022-02-22 NOTE — TELEPHONE ENCOUNTER
Care Due:                  Date            Visit Type   Department     Provider  --------------------------------------------------------------------------------                                [facility]                              PRIMARY      MED/ INTERNAL  Last Visit: 02-      CARE ([facility])   MED/ PEDS      Brynn To                              [facility]                              PRIMARY      MEDICINE/  Next Visit: 07-      CARE ([facility])   INTERNAL MED   Brynn To                                                            Last  Test          Frequency    Reason                     Performed    Due Date  --------------------------------------------------------------------------------    CMP.........  12 months..  pravastatin..............  03- 05-    Lipid Panel.  12 months..  pravastatin..............  11- 11-    Powered by [employer]. Reference number: [id number].   2/22/2022 12:35:51 PM CST

## 2022-02-23 RX ORDER — METOPROLOL SUCCINATE 50 MG/1
TABLET, EXTENDED RELEASE ORAL
Qty: 90 TABLET | Refills: 3 | Status: SHIPPED | OUTPATIENT
Start: 2022-02-23

## 2022-02-23 NOTE — TELEPHONE ENCOUNTER
Refill Routing Note   Medication(s) are not appropriate for processing by [facility] for the following reason(s):      - Drug-Disease Interaction (metoprolol succinate and Peripheral vascular disease, unspecified)    [facility] action(s):  Defer Medication-related problems identified:   Requires labs  Drug-disease interaction     Medication Therapy Plan: CDMR. LABS(CMP, lipids). DDI; defer  --->Care Gap information included in message below if applicable.   Medication reconciliation completed: No   Automatic Epic Generated Protocol Data:        Requested Prescriptions   Pending Prescriptions Disp Refills    metoprolol succinate (TOPROL-XL) 50 MG 24 hr tablet [Pharmacy Med Name: METOPROLOL SUCCINATE ER 50 MG Tablet Extended Release 24 Hour] 90 tablet 3     Sig: TAKE 1 TABLET EVERY DAY       Cardiovascular:  Beta Blockers Passed - 2/22/2022 12:35 PM        Passed - Patient is at least 18 years old        Passed - Last BP in normal range within 360 days     BP Readings from Last 1 Encounters:   02/14/22 118/68               Passed - Last Heart Rate in normal range within 360 days     Pulse Readings from Last 1 Encounters:   02/14/22 60              Passed - Valid encounter within last 15 months     Recent Visits  Date Type Provider Dept   02/14/22 Office Visit MD [facility]   09/15/21 Office Visit MD [facility]   06/11/21 Office Visit MD [facility]   03/11/21 Office Visit MD [facility]   02/01/21 Office Visit MD [facility]   12/31/20 Office Visit MD [facility]   07/07/20 Office Visit MD [facility]   Showing recent visits within past 720 days and meeting all other requirements  Future Appointments  No visits were found meeting these  conditions.  Showing future appointments within next 150 days and meeting all other requirements      Future Appointments              In 2 months EKG, APPT [facility], [facility]    In 2 months PACEMAKER, ICD [facility] Fl, [facility]    In 2 months Juliana Mckenzie, NP [facility] - Elect[facility]iology 3rd Fl, [facility]    In 4 months Brynn To MD [facility]                      Appointments  past 12m or future 3m with PCP    Date Provider   Last Visit   2/14/2022 Brynn To MD   Next Visit   7/15/2022 Brynn To MD   ED visits in past 90 days: 0        Note composed:2:04 PM 02/23/2022

## 2022-03-07 ENCOUNTER — TELEPHONE (OUTPATIENT)
Dept: PODIATRY | Facility: CLINIC | Age: 67
End: 2022-03-07
Payer: MEDICARE

## 2022-03-10 ENCOUNTER — OFFICE VISIT (OUTPATIENT)
Dept: PODIATRY | Facility: CLINIC | Age: 67
End: 2022-03-10
Payer: MEDICARE

## 2022-03-10 VITALS — BODY MASS INDEX: 35.4 KG/M2 | HEIGHT: 77 IN | WEIGHT: 299.81 LBS

## 2022-03-10 DIAGNOSIS — I73.9 PERIPHERAL VASCULAR DISEASE, UNSPECIFIED: Primary | ICD-10-CM

## 2022-03-10 DIAGNOSIS — B35.1 ONYCHOMYCOSIS DUE TO DERMATOPHYTE: ICD-10-CM

## 2022-03-10 PROCEDURE — 1159F PR MEDICATION LIST DOCUMENTED IN MEDICAL RECORD: ICD-10-PCS | Mod: CPTII,S$GLB,, | Performed by: PODIATRIST

## 2022-03-10 PROCEDURE — 3288F FALL RISK ASSESSMENT DOCD: CPT | Mod: CPTII,S$GLB,, | Performed by: PODIATRIST

## 2022-03-10 PROCEDURE — 3008F BODY MASS INDEX DOCD: CPT | Mod: CPTII,S$GLB,, | Performed by: PODIATRIST

## 2022-03-10 PROCEDURE — 1159F MED LIST DOCD IN RCRD: CPT | Mod: CPTII,S$GLB,, | Performed by: PODIATRIST

## 2022-03-10 PROCEDURE — 1101F PR PT FALLS ASSESS DOC 0-1 FALLS W/OUT INJ PAST YR: ICD-10-PCS | Mod: CPTII,S$GLB,, | Performed by: PODIATRIST

## 2022-03-10 PROCEDURE — 99999 PR PBB SHADOW E&M-EST. PATIENT-LVL III: ICD-10-PCS | Mod: PBBFAC,,, | Performed by: PODIATRIST

## 2022-03-10 PROCEDURE — 3008F PR BODY MASS INDEX (BMI) DOCUMENTED: ICD-10-PCS | Mod: CPTII,S$GLB,, | Performed by: PODIATRIST

## 2022-03-10 PROCEDURE — 1101F PT FALLS ASSESS-DOCD LE1/YR: CPT | Mod: CPTII,S$GLB,, | Performed by: PODIATRIST

## 2022-03-10 PROCEDURE — 99203 OFFICE O/P NEW LOW 30 MIN: CPT | Mod: S$GLB,,, | Performed by: PODIATRIST

## 2022-03-10 PROCEDURE — 99999 PR PBB SHADOW E&M-EST. PATIENT-LVL III: CPT | Mod: PBBFAC,,, | Performed by: PODIATRIST

## 2022-03-10 PROCEDURE — 99499 UNLISTED E&M SERVICE: CPT | Mod: S$GLB,,, | Performed by: PODIATRIST

## 2022-03-10 PROCEDURE — 3288F PR FALLS RISK ASSESSMENT DOCUMENTED: ICD-10-PCS | Mod: CPTII,S$GLB,, | Performed by: PODIATRIST

## 2022-03-10 PROCEDURE — 99203 PR OFFICE/OUTPT VISIT, NEW, LEVL III, 30-44 MIN: ICD-10-PCS | Mod: S$GLB,,, | Performed by: PODIATRIST

## 2022-03-10 PROCEDURE — 99499 RISK ADDL DX/OHS AUDIT: ICD-10-PCS | Mod: S$GLB,,, | Performed by: PODIATRIST

## 2022-03-14 NOTE — PROGRESS NOTES
Subjective:      Patient ID: Papito Bhakta is a 66 y.o. male.    Chief Complaint: Nail Care and Peripheral Vascular Disease    Papito is a 66 y.o. male who presents to the clinic for evaluation and treatment of high risk feet. Papito has a past medical history of RIGOBERTO (acute kidney injury) (10/7/2020), Anticoagulant long-term use, CHF (congestive heart failure), Hyperlipidemia, Hypertension, NICM (nonischemic cardiomyopathy) (6/16/2020), Obesity, AMELIA (obstructive sleep apnea) (1/7/2022), and Peripheral vascular disease, unspecified (2/1/2021). The patient's chief complaint is long, thick toenails. This patient has documented high risk feet requiring routine maintenance secondary to peripheral vascular disease. Reports elongated nails that are difficult to trim.     PCP: Brynn To MD    Date Last Seen by PCP: 2/14/22    Current shoe gear:  Affected Foot: Tennis shoes     Unaffected Foot: Tennis shoes    Last encounter in this department: Visit date not found    Hemoglobin A1C   Date Value Ref Range Status   11/19/2020 6.2 (H) 4.0 - 5.6 % Final     Comment:     ADA Screening Guidelines:  5.7-6.4%  Consistent with prediabetes  >or=6.5%  Consistent with diabetes  High levels of fetal hemoglobin interfere with the HbA1C  assay. Heterozygous hemoglobin variants (HbS, HgC, etc)do  not significantly interfere with this assay.   However, presence of multiple variants may affect accuracy.         Review of Systems   Constitutional: Negative for chills.   Cardiovascular: Negative for chest pain and claudication.   Respiratory: Negative for cough.    Skin: Positive for color change, dry skin and nail changes.   Musculoskeletal: Positive for joint pain.   Gastrointestinal: Negative for nausea.   Neurological: Positive for paresthesias. Negative for numbness.   Psychiatric/Behavioral: The patient is not nervous/anxious.            Objective:      Physical Exam  Constitutional:       Appearance: He is well-developed.    Cardiovascular:      Comments: Dorsalis pedis and posterior tibial pulses are diminished Bilaterally. Toes are cool to touch. Feet are warm proximally.There is decreased digital hair. Skin is atrophic, slightly hyperpigmented, and mildly edematous   Pulmonary:      Effort: No respiratory distress.   Musculoskeletal:         General: Tenderness present.      Comments: Adequate joint range of motion without pain, limitation, nor crepitation Bilateral feet and ankle joints. Muscle strength is 5/5 in all groups bilaterally.    Feet:      Right foot:      Skin integrity: Callus and dry skin present. No ulcer or skin breakdown.      Left foot:      Skin integrity: Dry skin present. No ulcer.   Skin:     General: Skin is dry.      Findings: No erythema.      Comments: Nails x10 are elongated by 2-6mm's, thickened by 3-5 mm's, dystrophic, and are darkened in coloration . Xerosis Bilaterally. No open lesions noted      Neurological:      Mental Status: He is alert.      Comments: [place]-Sean 5.07 monofilamant testing is diminished Wilman feet. Sharp/dull sensation diminished Bilaterally. Light touch absent Bilaterally.                Assessment:       Encounter Diagnoses   Name Primary?    Peripheral vascular disease, unspecified Yes    Onychomycosis due to dermatophyte          Plan:       Papito was seen today for nail care and peripheral vascular disease.    Diagnoses and all orders for this visit:    Peripheral vascular disease, unspecified    Onychomycosis due to dermatophyte      I counseled the patient on his conditions, their implications and medical management.      - Shoe inspection. Diabetic Foot Education. Patient reminded of the importance of good nutrition and blood sugar control to help prevent podiatric complications of diabetes. Patient instructed on proper foot hygeine. We discussed wearing proper shoe gear, daily foot inspections, never walking without protective shoe gear, never putting sharp  instruments to feet, routine podiatric nail visits every 2-3 months.   - With patient's permission, nails were aggressively reduced and debrided x 10 to their soft tissue attachment mechanically and with electric , removing all offending nail and debris. Patient relates relief following the procedure. He will continue to monitor the areas daily, inspect his feet, wear protective shoe gear when ambulatory, moisturizer to maintain skin integrity and follow in this office in approximately 2-3 months, sooner p.r.n.

## 2022-03-21 ENCOUNTER — CLINICAL SUPPORT (OUTPATIENT)
Dept: CARDIOLOGY | Facility: HOSPITAL | Age: 67
End: 2022-03-21
Payer: MEDICARE

## 2022-03-21 DIAGNOSIS — I42.5 OTHER RESTRICTIVE CARDIOMYOPATHY: ICD-10-CM

## 2022-03-21 DIAGNOSIS — Z95.810 PRESENCE OF AUTOMATIC (IMPLANTABLE) CARDIAC DEFIBRILLATOR: ICD-10-CM

## 2022-03-21 PROCEDURE — 93296 REM INTERROG EVL PM/IDS: CPT | Performed by: INTERNAL MEDICINE

## 2022-04-20 NOTE — PROGRESS NOTES
"Mr. Bhakta is a patient of Dr. Kwong and was last seen in clinic 5/12/2021.      Subjective:   Patient ID:  Papito Bhakta is a 66 y.o. male who presents for follow-up of CRT-D  .     HPI:    Mr. Bhakta is a 66 y.o. male with HTN, HLD, NICM s/p CRT-D (2/13/2019, extracted 6/17/2020, reimplanted rt side 9/28/2020) here for follow up.     Background:    Cardiologist: Makayla Long MD.    6/15/2020: Mr. Bhakta is a 65-year-old male with a past medical history significant for hypertension, hyperlipidemia, nonischemic cardiomyopathy status post Bi-V ICD 02/13/2019 [employer] with Dr. Shailesh Eng  EF 10%.  He notes increased drainage from his pocket site over the past 1 week.  Yesterday morning he was performing yard work and noticed pain in his left pectoral region with acute dehiscence of his pocket.     6/17/2020: Successful device extraction. Complex/difficult due to dense adhesions on lead, requiring use of a mechanical cutting sheath. Successful excision of necrotic/infected tissue (5x10x3 cm volume). Complex wound closure.  Plan: ABx per ID consult. Follow up with me in clinic at the end of the antibiotic course. LifeVest until a new right-sided biV ICD can be implanted.     Per EP, new ICD to be placed in mid-July or when cleared by ID.    ID appt 7/22/2020: "OK to reimplant PPM on opposite side per EP."    9/28/2020: Successful implantation of ICD BIV placed for primary intervention (challenging/difficult due to R sided anatomy, extremely large heart, acute subclavian/SVC angle).    12/30/2020: Defibrillator fired yesterday around 2 pm. Looks like he went into ventricular fibrillation. He says he was doing some floor exercises when he felt the shock. His beta blocker was increased.    1/15/2021: He is almost 4 mo s/p rt-sided CRT-D implantation. Had ICD discharge shortly thereafter due to MMVT. Continues to have NSVT episodes. Plan per Dr. Kwong is to increase metoprolol and start amiodarone. Now taking " metoprolol 50mg. BP too low for additional increase. Will add amiodarone per plan. Stable from device standpoint. 93% Biventricular pacing, which may increase if amio reduces PVC %. He is feeling well.     5/12/2021: Mr. Bhakta is doing well from a device perspective with stable lead and device function. Now on amiodarone for MMVT (started Jan 2021). No subsequent arrhythmia noted. 95% biventricular pacing. On GDMT. No CHF symptoms. He feels well.    Update (04/28/2022):    Today he says he feels at baseline. Some chronic KING that has not worsened. Babysits his grandchild. No CP, palps, LH, syncope.    He is currently on amiodarone 200mg daily for VT. LFTs are WNL 5/2021. TSH is WNL 5/2021. Last PFT on 12/2021 showed a DLCO ratio of 70.     Device Interrogation (4/28/2022) reveals an intrinsic SB with 1st deg AVB with stable lead and device function. No arrhythmias or treated episodes were noted.  He paces 97% in the RA and 99% in the BiV. Estimated battery longevity 5.6-5.9 years.     I have personally reviewed the patient's EKG today, which shows APVP at 60bpm. RI interval is 264. QRS is 176. QTc is 496.    Relevant Cardiac Test Results:    2D Echo (9/13/2020):  · The left ventricle is severely enlarged with severely decreased systolic function. The estimated ejection fraction is 18%.  · There are segmental left ventricular wall motion abnormalities.  · There is moderate left ventricular eccentric hypertrophy.  · Grade III diastolic dysfunction.  · Severe left atrial enlargement.  · Moderate right ventricular enlargement.  · Low normal right ventricular systolic function.  · Severe right atrial enlargement.  · Moderate mitral regurgitation.  · Mild to moderate tricuspid regurgitation.  · There is pulmonary hypertension at least in the 50s.      Current Outpatient Medications   Medication Sig    amiodarone (PACERONE) 200 MG Tab TAKE 1 TABLET EVERY DAY    aspirin (ECOTRIN) 325 MG EC tablet Take 1 tablet (325 mg  "total) by mouth once daily.    furosemide (LASIX) 80 MG tablet One tablet twice a day every other day    metoprolol succinate (TOPROL-XL) 50 MG 24 hr tablet TAKE 1 TABLET EVERY DAY    potassium chloride (K-TAB) 20 mEq Take 1 tablet (20 mEq total) by mouth once daily.    pravastatin (PRAVACHOL) 80 MG tablet TAKE 1 TABLET EVERY DAY    sacubitril/valsartan (ENTRESTO ORAL) Take 1 tablet by mouth once daily. Mg unknown, did not bring.    spironolactone (ALDACTONE) 25 MG tablet Take 0.5 tablets (12.5 mg total) by mouth once daily. Hold if SBP < 110    gabapentin (NEURONTIN) 100 MG capsule Take 1 capsule (100 mg total) by mouth 3 (three) times daily. Take 100 mg by mouth 3 (three) times daily.     No current facility-administered medications for this visit.       Review of Systems   Constitutional: Negative for malaise/fatigue.   Cardiovascular: Positive for dyspnea on exertion (mild, chronic). Negative for chest pain, irregular heartbeat, leg swelling and palpitations.   Respiratory: Negative for shortness of breath.    Hematologic/Lymphatic: Negative for bleeding problem.   Skin: Negative for rash.   Musculoskeletal: Negative for myalgias.   Gastrointestinal: Negative for hematemesis, hematochezia and nausea.   Genitourinary: Negative for hematuria.   Neurological: Negative for light-headedness.   Psychiatric/Behavioral: Negative for altered mental status.   Allergic/Immunologic: Negative for persistent infections.       Objective:          /70   Pulse 60   Ht 6' 5" (1.956 m)   Wt 132.6 kg (292 lb 5.3 oz)   BMI 34.67 kg/m²     Physical Exam  Vitals and nursing note reviewed.   Constitutional:       Appearance: Normal appearance. He is well-developed.   HENT:      Head: Normocephalic.      Nose: Nose normal.   Eyes:      Pupils: Pupils are equal, round, and reactive to light.   Cardiovascular:      Rate and Rhythm: Normal rate and regular rhythm.   Pulmonary:      Effort: No respiratory distress.      " Breath sounds: Normal breath sounds.   Chest:      Comments: Device to RUW. Incision and pocket in good repair.    Musculoskeletal:         General: Normal range of motion.   Skin:     General: Skin is warm and dry.      Findings: No erythema.   Neurological:      Mental Status: He is alert and oriented to person, place, and time.   Psychiatric:         Speech: Speech normal.         Behavior: Behavior normal.           Lab Results   Component Value Date     09/15/2021    K 4.5 09/15/2021    MG 1.7 11/21/2020    BUN 15 09/15/2021    CREATININE 1.2 09/15/2021    ALT 6 (L) 05/12/2021    AST 15 05/12/2021    HGB 12.6 (L) 03/31/2021    HCT 41.3 03/31/2021    HCT 37 09/28/2020    TSH 3.421 05/12/2021    LDLCALC 77.6 11/18/2020       Recent Labs   Lab 06/16/20  0745 06/17/20  0443 09/25/20  0940 03/31/21  1808   INR 1.1 1.1 1.2 1.0         Assessment:     1. Biventricular ICD (implantable cardioverter-defibrillator) in place    2. NICM (nonischemic cardiomyopathy)    3. Essential hypertension    4. Ventricular tachycardia    5. AMELIA (obstructive sleep apnea)    6. Severe obesity (BMI 35.0-39.9) with comorbidity    7. Long term current use of amiodarone      Plan:     In summary, Mr. Bhakta is a 66 y.o. male with HTN, HLD, NICM s/p CRT-D (2/13/2019, extracted 6/17/2020, reimplanted rt side 9/28/2020) here for follow up.   Mr. Bhakta is doing well from a device perspective with stable lead and device function. No arrhythmia noted. On amiodarone for VT with stable amio tests. 99% biventricular pacing. No hospitalizations for CHF since last clinic visit. On GDMT.    TSH, LFT to next set of labs  Continue current medication regimen and device settings.   Follow up in device clinic as scheduled.   Follow up in EP clinic in 6 mo, sooner as needed.     *A copy of this note has been sent to Dr. Kwong*    Follow up in about 6 months (around  10/28/2022).    ------------------------------------------------------------------    SERINA Riggs, NP-C  Cardiac Electrophysiology

## 2022-04-28 ENCOUNTER — CLINICAL SUPPORT (OUTPATIENT)
Dept: CARDIOLOGY | Facility: HOSPITAL | Age: 67
End: 2022-04-28
Attending: INTERNAL MEDICINE
Payer: MEDICARE

## 2022-04-28 ENCOUNTER — HOSPITAL ENCOUNTER (OUTPATIENT)
Dept: CARDIOLOGY | Facility: CLINIC | Age: 67
Discharge: HOME | End: 2022-04-28
Payer: MEDICARE

## 2022-04-28 ENCOUNTER — OFFICE VISIT (OUTPATIENT)
Dept: ELECTROPHYSIOLOGY | Facility: CLINIC | Age: 67
End: 2022-04-28
Payer: MEDICARE

## 2022-04-28 VITALS
SYSTOLIC BLOOD PRESSURE: 135 MMHG | HEART RATE: 60 BPM | WEIGHT: 292.31 LBS | DIASTOLIC BLOOD PRESSURE: 70 MMHG | HEIGHT: 77 IN | BODY MASS INDEX: 34.51 KG/M2

## 2022-04-28 DIAGNOSIS — I49.8 OTHER SPECIFIED CARDIAC ARRHYTHMIAS: ICD-10-CM

## 2022-04-28 DIAGNOSIS — Z79.899 LONG TERM CURRENT USE OF AMIODARONE: ICD-10-CM

## 2022-04-28 DIAGNOSIS — Z95.810 BIVENTRICULAR ICD (IMPLANTABLE CARDIOVERTER-DEFIBRILLATOR) IN PLACE: Primary | ICD-10-CM

## 2022-04-28 DIAGNOSIS — I42.8 NICM (NONISCHEMIC CARDIOMYOPATHY): Chronic | ICD-10-CM

## 2022-04-28 DIAGNOSIS — I10 ESSENTIAL HYPERTENSION: Chronic | ICD-10-CM

## 2022-04-28 DIAGNOSIS — I47.20 VENTRICULAR TACHYCARDIA: ICD-10-CM

## 2022-04-28 DIAGNOSIS — E66.01 SEVERE OBESITY (BMI 35.0-39.9) WITH COMORBIDITY: ICD-10-CM

## 2022-04-28 DIAGNOSIS — G47.33 OSA (OBSTRUCTIVE SLEEP APNEA): ICD-10-CM

## 2022-04-28 PROCEDURE — 3078F DIAST BP <80 MM HG: CPT | Mod: CPTII,S$GLB,, | Performed by: NURSE PRACTITIONER

## 2022-04-28 PROCEDURE — 93284 PRGRMG EVAL IMPLANTABLE DFB: CPT | Mod: 26,,, | Performed by: INTERNAL MEDICINE

## 2022-04-28 PROCEDURE — 99499 RISK ADDL DX/OHS AUDIT: ICD-10-PCS | Mod: S$GLB,,, | Performed by: NURSE PRACTITIONER

## 2022-04-28 PROCEDURE — 99214 PR OFFICE/OUTPT VISIT, EST, LEVL IV, 30-39 MIN: ICD-10-PCS | Mod: S$GLB,,, | Performed by: NURSE PRACTITIONER

## 2022-04-28 PROCEDURE — 93005 RHYTHM STRIP: ICD-10-PCS | Mod: S$GLB,,, | Performed by: INTERNAL MEDICINE

## 2022-04-28 PROCEDURE — 3078F PR MOST RECENT DIASTOLIC BLOOD PRESSURE < 80 MM HG: ICD-10-PCS | Mod: CPTII,S$GLB,, | Performed by: NURSE PRACTITIONER

## 2022-04-28 PROCEDURE — 93005 ELECTROCARDIOGRAM TRACING: CPT | Mod: S$GLB,,, | Performed by: INTERNAL MEDICINE

## 2022-04-28 PROCEDURE — 93010 RHYTHM STRIP: ICD-10-PCS | Mod: S$GLB,,, | Performed by: INTERNAL MEDICINE

## 2022-04-28 PROCEDURE — 93010 ELECTROCARDIOGRAM REPORT: CPT | Mod: S$GLB,,, | Performed by: INTERNAL MEDICINE

## 2022-04-28 PROCEDURE — 99214 OFFICE O/P EST MOD 30 MIN: CPT | Mod: S$GLB,,, | Performed by: NURSE PRACTITIONER

## 2022-04-28 PROCEDURE — 1160F PR REVIEW ALL MEDS BY PRESCRIBER/CLIN PHARMACIST DOCUMENTED: ICD-10-PCS | Mod: CPTII,S$GLB,, | Performed by: NURSE PRACTITIONER

## 2022-04-28 PROCEDURE — 1160F RVW MEDS BY RX/DR IN RCRD: CPT | Mod: CPTII,S$GLB,, | Performed by: NURSE PRACTITIONER

## 2022-04-28 PROCEDURE — 3008F BODY MASS INDEX DOCD: CPT | Mod: CPTII,S$GLB,, | Performed by: NURSE PRACTITIONER

## 2022-04-28 PROCEDURE — 93284 CARDIAC DEVICE CHECK - IN CLINIC & HOSPITAL: ICD-10-PCS | Mod: 26,,, | Performed by: INTERNAL MEDICINE

## 2022-04-28 PROCEDURE — 99499 UNLISTED E&M SERVICE: CPT | Mod: S$GLB,,, | Performed by: NURSE PRACTITIONER

## 2022-04-28 PROCEDURE — 99999 PR PBB SHADOW E&M-EST. PATIENT-LVL IV: ICD-10-PCS | Mod: PBBFAC,,, | Performed by: NURSE PRACTITIONER

## 2022-04-28 PROCEDURE — 3008F PR BODY MASS INDEX (BMI) DOCUMENTED: ICD-10-PCS | Mod: CPTII,S$GLB,, | Performed by: NURSE PRACTITIONER

## 2022-04-28 PROCEDURE — 1126F AMNT PAIN NOTED NONE PRSNT: CPT | Mod: CPTII,S$GLB,, | Performed by: NURSE PRACTITIONER

## 2022-04-28 PROCEDURE — 93284 PRGRMG EVAL IMPLANTABLE DFB: CPT

## 2022-04-28 PROCEDURE — 3075F PR MOST RECENT SYSTOLIC BLOOD PRESS GE 130-139MM HG: ICD-10-PCS | Mod: CPTII,S$GLB,, | Performed by: NURSE PRACTITIONER

## 2022-04-28 PROCEDURE — 1126F PR PAIN SEVERITY QUANTIFIED, NO PAIN PRESENT: ICD-10-PCS | Mod: CPTII,S$GLB,, | Performed by: NURSE PRACTITIONER

## 2022-04-28 PROCEDURE — 1159F PR MEDICATION LIST DOCUMENTED IN MEDICAL RECORD: ICD-10-PCS | Mod: CPTII,S$GLB,, | Performed by: NURSE PRACTITIONER

## 2022-04-28 PROCEDURE — 99999 PR PBB SHADOW E&M-EST. PATIENT-LVL IV: CPT | Mod: PBBFAC,,, | Performed by: NURSE PRACTITIONER

## 2022-04-28 PROCEDURE — 3075F SYST BP GE 130 - 139MM HG: CPT | Mod: CPTII,S$GLB,, | Performed by: NURSE PRACTITIONER

## 2022-04-28 PROCEDURE — 1159F MED LIST DOCD IN RCRD: CPT | Mod: CPTII,S$GLB,, | Performed by: NURSE PRACTITIONER

## 2022-06-16 ENCOUNTER — OFFICE VISIT (OUTPATIENT)
Dept: PODIATRY | Facility: CLINIC | Age: 67
End: 2022-06-16
Payer: MEDICARE

## 2022-06-16 VITALS — WEIGHT: 292.31 LBS | BODY MASS INDEX: 34.51 KG/M2 | HEIGHT: 77 IN

## 2022-06-16 DIAGNOSIS — I73.9 PERIPHERAL VASCULAR DISEASE, UNSPECIFIED: Primary | ICD-10-CM

## 2022-06-16 DIAGNOSIS — B35.1 ONYCHOMYCOSIS DUE TO DERMATOPHYTE: ICD-10-CM

## 2022-06-16 DIAGNOSIS — L84 CORN OR CALLUS: ICD-10-CM

## 2022-06-16 PROCEDURE — 3288F FALL RISK ASSESSMENT DOCD: CPT | Mod: CPTII,S$GLB,, | Performed by: PODIATRIST

## 2022-06-16 PROCEDURE — 99499 UNLISTED E&M SERVICE: CPT | Mod: Q9,S$GLB,, | Performed by: PODIATRIST

## 2022-06-16 PROCEDURE — 99999 PR PBB SHADOW E&M-EST. PATIENT-LVL III: ICD-10-PCS | Mod: PBBFAC,,, | Performed by: PODIATRIST

## 2022-06-16 PROCEDURE — 1101F PT FALLS ASSESS-DOCD LE1/YR: CPT | Mod: CPTII,S$GLB,, | Performed by: PODIATRIST

## 2022-06-16 PROCEDURE — 1101F PR PT FALLS ASSESS DOC 0-1 FALLS W/OUT INJ PAST YR: ICD-10-PCS | Mod: CPTII,S$GLB,, | Performed by: PODIATRIST

## 2022-06-16 PROCEDURE — 3008F BODY MASS INDEX DOCD: CPT | Mod: CPTII,S$GLB,, | Performed by: PODIATRIST

## 2022-06-16 PROCEDURE — 1125F PR PAIN SEVERITY QUANTIFIED, PAIN PRESENT: ICD-10-PCS | Mod: CPTII,S$GLB,, | Performed by: PODIATRIST

## 2022-06-16 PROCEDURE — 11721 PR DEBRIDEMENT OF NAILS, 6 OR MORE: ICD-10-PCS | Mod: 59,Q9,S$GLB, | Performed by: PODIATRIST

## 2022-06-16 PROCEDURE — 1125F AMNT PAIN NOTED PAIN PRSNT: CPT | Mod: CPTII,S$GLB,, | Performed by: PODIATRIST

## 2022-06-16 PROCEDURE — 11056 PARNG/CUTG B9 HYPRKR LES 2-4: CPT | Mod: Q9,S$GLB,, | Performed by: PODIATRIST

## 2022-06-16 PROCEDURE — 11721 DEBRIDE NAIL 6 OR MORE: CPT | Mod: 59,Q9,S$GLB, | Performed by: PODIATRIST

## 2022-06-16 PROCEDURE — 99499 NO LOS: ICD-10-PCS | Mod: Q9,S$GLB,, | Performed by: PODIATRIST

## 2022-06-16 PROCEDURE — 99999 PR PBB SHADOW E&M-EST. PATIENT-LVL III: CPT | Mod: PBBFAC,,, | Performed by: PODIATRIST

## 2022-06-16 PROCEDURE — 3288F PR FALLS RISK ASSESSMENT DOCUMENTED: ICD-10-PCS | Mod: CPTII,S$GLB,, | Performed by: PODIATRIST

## 2022-06-16 PROCEDURE — 11056 PR TRIM BENIGN HYPERKERATOTIC SKIN LESION,2-4: ICD-10-PCS | Mod: Q9,S$GLB,, | Performed by: PODIATRIST

## 2022-06-16 PROCEDURE — 1159F MED LIST DOCD IN RCRD: CPT | Mod: CPTII,S$GLB,, | Performed by: PODIATRIST

## 2022-06-16 PROCEDURE — 3008F PR BODY MASS INDEX (BMI) DOCUMENTED: ICD-10-PCS | Mod: CPTII,S$GLB,, | Performed by: PODIATRIST

## 2022-06-16 PROCEDURE — 99499 UNLISTED E&M SERVICE: CPT | Mod: S$GLB,,, | Performed by: PODIATRIST

## 2022-06-16 PROCEDURE — 1159F PR MEDICATION LIST DOCUMENTED IN MEDICAL RECORD: ICD-10-PCS | Mod: CPTII,S$GLB,, | Performed by: PODIATRIST

## 2022-06-16 NOTE — PROGRESS NOTES
Subjective:      Patient ID: Papito Bhakta is a 67 y.o. male.    Chief Complaint: Nail Care, Peripheral Vascular Disease (2/14/22 Dr To PCP), and Callouses    Papito is a 67 y.o. male who presents to the clinic for evaluation and treatment of high risk feet. Papito has a past medical history of RIGOBERTO (acute kidney injury) (10/7/2020), Anticoagulant long-term use, CHF (congestive heart failure), Hyperlipidemia, Hypertension, NICM (nonischemic cardiomyopathy) (6/16/2020), Obesity, AMELIA (obstructive sleep apnea) (1/7/2022), and Peripheral vascular disease, unspecified (2/1/2021). The patient's chief complaint is long, thick toenails. This patient has documented high risk feet requiring routine maintenance secondary to peripheral vascular disease. Reports elongated nails that are difficult to trim. Painful callus right plantar foot.     PCP: Brynn To MD    Date Last Seen by PCP: 2/14/22    Current shoe gear:  Affected Foot: Tennis shoes     Unaffected Foot: Tennis shoes    Last encounter in this department: Visit date not found    Hemoglobin A1C   Date Value Ref Range Status   11/19/2020 6.2 (H) 4.0 - 5.6 % Final     Comment:     ADA Screening Guidelines:  5.7-6.4%  Consistent with prediabetes  >or=6.5%  Consistent with diabetes  High levels of fetal hemoglobin interfere with the HbA1C  assay. Heterozygous hemoglobin variants (HbS, HgC, etc)do  not significantly interfere with this assay.   However, presence of multiple variants may affect accuracy.         Review of Systems   Constitutional: Negative for chills.   Cardiovascular: Negative for chest pain and claudication.   Respiratory: Negative for cough.    Skin: Positive for color change, dry skin and nail changes.   Musculoskeletal: Positive for joint pain.   Gastrointestinal: Negative for nausea.   Neurological: Positive for paresthesias. Negative for numbness.   Psychiatric/Behavioral: The patient is not nervous/anxious.            Objective:      Physical  Exam  Constitutional:       Appearance: He is well-developed.   Cardiovascular:      Comments: Dorsalis pedis and posterior tibial pulses are diminished Bilaterally. Toes are cool to touch. Feet are warm proximally.There is decreased digital hair. Skin is atrophic, slightly hyperpigmented, and mildly edematous   Pulmonary:      Effort: No respiratory distress.   Musculoskeletal:         General: Tenderness present.      Comments: Adequate joint range of motion without pain, limitation, nor crepitation Bilateral feet and ankle joints. Muscle strength is 5/5 in all groups bilaterally.    Feet:      Right foot:      Skin integrity: Callus and dry skin present. No ulcer or skin breakdown.      Left foot:      Skin integrity: Dry skin present. No ulcer.   Skin:     General: Skin is dry.      Findings: No erythema.      Comments: Nails x10 are elongated by 2-6mm's, thickened by 3-5 mm's, dystrophic, and are darkened in coloration . Xerosis Bilaterally. No open lesions noted      Neurological:      Mental Status: He is alert.      Comments: [place]-Sean 5.07 monofilamant testing is diminished Wilman feet. Sharp/dull sensation diminished Bilaterally. Light touch absent Bilaterally.                Assessment:       Encounter Diagnoses   Name Primary?    Peripheral vascular disease, unspecified Yes    Onychomycosis due to dermatophyte     Corn or callus          Plan:       Papito was seen today for nail care, peripheral vascular disease and callouses.    Diagnoses and all orders for this visit:    Peripheral vascular disease, unspecified    Onychomycosis due to dermatophyte    Corn or callus      I counseled the patient on his conditions, their implications and medical management.      - Shoe inspection. Diabetic Foot Education. Patient reminded of the importance of good nutrition and blood sugar control to help prevent podiatric complications of diabetes. Patient instructed on proper foot hygeine. We discussed wearing  proper shoe gear, daily foot inspections, never walking without protective shoe gear, never putting sharp instruments to feet, routine podiatric nail visits every 2-3 months.   - With patient's permission, nails were aggressively reduced and debrided x 10 to their soft tissue attachment mechanically and with electric , removing all offending nail and debris. Patient relates relief following the procedure. He will continue to monitor the areas daily, inspect his feet, wear protective shoe gear when ambulatory, moisturizer to maintain skin integrity and follow in this office in approximately 2-3 months, sooner p.r.n.     - After cleansing the area w/ alcohol prep pad the above mentioned hyperkeratosis was trimmed utilizing No 3 curette right plantar foot.

## 2022-06-18 NOTE — TELEPHONE ENCOUNTER
Pt was confused with a recall letter he received to sched appt, I had already sched his appt for 04/16 for device check.. Pt keeping appt     ef        ----- Message from Fatimah Gabriel sent at 3/20/2019 10:05 AM CDT -----  Contact: pt  Name of Who is Calling: pt      What is the request in detail: pt has appt in April but received a letter to come in in may. He just wants to know if he has to reschedule or what he needs to do. Call pt      Can the clinic reply by [facility]: no      What Number to Call Back if not in [facility]: [phone]                                      
CONGESTION/COUGH/DIARRHEA/FEVER/VOMITING

## 2022-06-19 ENCOUNTER — CLINICAL SUPPORT (OUTPATIENT)
Dept: CARDIOLOGY | Facility: HOSPITAL | Age: 67
End: 2022-06-19
Payer: MEDICARE

## 2022-06-19 DIAGNOSIS — Z95.810 PRESENCE OF AUTOMATIC (IMPLANTABLE) CARDIAC DEFIBRILLATOR: ICD-10-CM

## 2022-06-19 DIAGNOSIS — I47.20 VENTRICULAR TACHYCARDIA: ICD-10-CM

## 2022-06-19 DIAGNOSIS — I42.9 CARDIOMYOPATHY, UNSPECIFIED: ICD-10-CM

## 2022-06-19 DIAGNOSIS — I50.9 HEART FAILURE, UNSPECIFIED: ICD-10-CM

## 2022-06-19 PROCEDURE — 93295 DEV INTERROG REMOTE 1/2/MLT: CPT | Mod: ,,, | Performed by: INTERNAL MEDICINE

## 2022-06-19 PROCEDURE — 93296 REM INTERROG EVL PM/IDS: CPT | Performed by: INTERNAL MEDICINE

## 2022-06-19 PROCEDURE — 93295 CARDIAC DEVICE CHECK - REMOTE: ICD-10-PCS | Mod: ,,, | Performed by: INTERNAL MEDICINE

## 2022-07-15 ENCOUNTER — OFFICE VISIT (OUTPATIENT)
Dept: FAMILY MEDICINE | Facility: CLINIC | Age: 67
End: 2022-07-15
Payer: MEDICARE

## 2022-07-15 VITALS
HEIGHT: 77 IN | RESPIRATION RATE: 18 BRPM | OXYGEN SATURATION: 98 % | BODY MASS INDEX: 35.48 KG/M2 | TEMPERATURE: 98 F | DIASTOLIC BLOOD PRESSURE: 72 MMHG | HEART RATE: 60 BPM | WEIGHT: 300.5 LBS | SYSTOLIC BLOOD PRESSURE: 122 MMHG

## 2022-07-15 DIAGNOSIS — Z86.39 HISTORY OF LOW POTASSIUM: ICD-10-CM

## 2022-07-15 DIAGNOSIS — I50.42 CHRONIC COMBINED SYSTOLIC AND DIASTOLIC CONGESTIVE HEART FAILURE: ICD-10-CM

## 2022-07-15 DIAGNOSIS — R73.03 PREDIABETES: ICD-10-CM

## 2022-07-15 DIAGNOSIS — I42.8 NICM (NONISCHEMIC CARDIOMYOPATHY): Primary | Chronic | ICD-10-CM

## 2022-07-15 DIAGNOSIS — E78.2 MIXED HYPERLIPIDEMIA: Chronic | ICD-10-CM

## 2022-07-15 DIAGNOSIS — Z13.6 ENCOUNTER FOR ABDOMINAL AORTIC ANEURYSM SCREENING: ICD-10-CM

## 2022-07-15 DIAGNOSIS — I10 ESSENTIAL HYPERTENSION: Chronic | ICD-10-CM

## 2022-07-15 PROCEDURE — 1101F PT FALLS ASSESS-DOCD LE1/YR: CPT | Mod: CPTII,S$GLB,, | Performed by: NURSE PRACTITIONER

## 2022-07-15 PROCEDURE — 99214 PR OFFICE/OUTPT VISIT, EST, LEVL IV, 30-39 MIN: ICD-10-PCS | Mod: S$GLB,,, | Performed by: NURSE PRACTITIONER

## 2022-07-15 PROCEDURE — 3008F BODY MASS INDEX DOCD: CPT | Mod: CPTII,S$GLB,, | Performed by: NURSE PRACTITIONER

## 2022-07-15 PROCEDURE — 3288F PR FALLS RISK ASSESSMENT DOCUMENTED: ICD-10-PCS | Mod: CPTII,S$GLB,, | Performed by: NURSE PRACTITIONER

## 2022-07-15 PROCEDURE — 3008F PR BODY MASS INDEX (BMI) DOCUMENTED: ICD-10-PCS | Mod: CPTII,S$GLB,, | Performed by: NURSE PRACTITIONER

## 2022-07-15 PROCEDURE — 99999 PR PBB SHADOW E&M-EST. PATIENT-LVL IV: CPT | Mod: PBBFAC,,, | Performed by: NURSE PRACTITIONER

## 2022-07-15 PROCEDURE — 3078F PR MOST RECENT DIASTOLIC BLOOD PRESSURE < 80 MM HG: ICD-10-PCS | Mod: CPTII,S$GLB,, | Performed by: NURSE PRACTITIONER

## 2022-07-15 PROCEDURE — 1101F PR PT FALLS ASSESS DOC 0-1 FALLS W/OUT INJ PAST YR: ICD-10-PCS | Mod: CPTII,S$GLB,, | Performed by: NURSE PRACTITIONER

## 2022-07-15 PROCEDURE — 1126F PR PAIN SEVERITY QUANTIFIED, NO PAIN PRESENT: ICD-10-PCS | Mod: CPTII,S$GLB,, | Performed by: NURSE PRACTITIONER

## 2022-07-15 PROCEDURE — 1159F MED LIST DOCD IN RCRD: CPT | Mod: CPTII,S$GLB,, | Performed by: NURSE PRACTITIONER

## 2022-07-15 PROCEDURE — 3288F FALL RISK ASSESSMENT DOCD: CPT | Mod: CPTII,S$GLB,, | Performed by: NURSE PRACTITIONER

## 2022-07-15 PROCEDURE — 1126F AMNT PAIN NOTED NONE PRSNT: CPT | Mod: CPTII,S$GLB,, | Performed by: NURSE PRACTITIONER

## 2022-07-15 PROCEDURE — 3074F SYST BP LT 130 MM HG: CPT | Mod: CPTII,S$GLB,, | Performed by: NURSE PRACTITIONER

## 2022-07-15 PROCEDURE — 3074F PR MOST RECENT SYSTOLIC BLOOD PRESSURE < 130 MM HG: ICD-10-PCS | Mod: CPTII,S$GLB,, | Performed by: NURSE PRACTITIONER

## 2022-07-15 PROCEDURE — 1160F RVW MEDS BY RX/DR IN RCRD: CPT | Mod: CPTII,S$GLB,, | Performed by: NURSE PRACTITIONER

## 2022-07-15 PROCEDURE — 1160F PR REVIEW ALL MEDS BY PRESCRIBER/CLIN PHARMACIST DOCUMENTED: ICD-10-PCS | Mod: CPTII,S$GLB,, | Performed by: NURSE PRACTITIONER

## 2022-07-15 PROCEDURE — 1159F PR MEDICATION LIST DOCUMENTED IN MEDICAL RECORD: ICD-10-PCS | Mod: CPTII,S$GLB,, | Performed by: NURSE PRACTITIONER

## 2022-07-15 PROCEDURE — 99214 OFFICE O/P EST MOD 30 MIN: CPT | Mod: S$GLB,,, | Performed by: NURSE PRACTITIONER

## 2022-07-15 PROCEDURE — 99999 PR PBB SHADOW E&M-EST. PATIENT-LVL IV: ICD-10-PCS | Mod: PBBFAC,,, | Performed by: NURSE PRACTITIONER

## 2022-07-15 PROCEDURE — 3078F DIAST BP <80 MM HG: CPT | Mod: CPTII,S$GLB,, | Performed by: NURSE PRACTITIONER

## 2022-07-15 RX ORDER — FUROSEMIDE 80 MG/1
80 TABLET ORAL DAILY
Qty: 90 TABLET | Refills: 0 | Status: SHIPPED | OUTPATIENT
Start: 2022-07-15 | End: 2022-07-28 | Stop reason: SDUPTHER

## 2022-07-15 RX ORDER — POTASSIUM CHLORIDE 1500 MG/1
20 TABLET, EXTENDED RELEASE ORAL DAILY
Qty: 90 TABLET | Refills: 0 | Status: SHIPPED | OUTPATIENT
Start: 2022-07-15 | End: 2022-09-08

## 2022-07-15 RX ORDER — SPIRONOLACTONE 25 MG/1
12.5 TABLET ORAL DAILY
Qty: 45 TABLET | Refills: 0 | Status: SHIPPED | OUTPATIENT
Start: 2022-07-15 | End: 2022-09-08

## 2022-07-15 NOTE — PROGRESS NOTES
Routine Office Visit    Patient Name: Papito Bhakta    : 1955  MRN: [id number]    Chief Complaint:  Follow-up, med refill    Subjective:  Papito is a 67 y.o. male who presents today for:    1. Follow-up, med refill - patient who is new to me with a history of hypertension, hyperlipidemia, cardiomyopathy, heart failure, ICD reports today for evaluation.  He needs a refill of a few of his medications.  He is taking the Lasix 80 mg every day.  He is taking 12.5 of the spironolactone daily as well as the potassium daily as well.  He denies any acute problems or concerns.  He states when he does not take the fluid pills he does get right lower extremity swelling, but he has no swelling today.  He is a former smoker.    Past Medical History  Past Medical History:   Diagnosis Date    RIGOBERTO (acute kidney injury) 10/7/2020    Anticoagulant long-term use     Aspirin    CHF (congestive heart failure)     Hyperlipidemia     Hypertension     NICM (nonischemic cardiomyopathy) 2020    Obesity     AMELIA (obstructive sleep apnea) 2022    Peripheral vascular disease, unspecified 2021       Past Surgical History  Past Surgical History:   Procedure Laterality Date    INSERTION OF BIVENTRICULAR IMPLANTABLE CARDIOVERTER-DEFIBRILLATOR (ICD) N/A 2019    Procedure: INSERTION, ICD, BIVENTRICULAR;  Surgeon: Shailesh Eng MD;  Location: [facility] CATH LAB;  Service: Cardiology;  Laterality: N/A;  RN PRE OP -19  1ST CASE PER  RADHA. NOTIFIED [facility] THAT ANESTHESIA IS NOT PITO FOR 1ST CASE START-LO    INSERTION OF BIVENTRICULAR IMPLANTABLE CARDIOVERTER-DEFIBRILLATOR (ICD) N/A 2020    Procedure: INSERTION, ICD, BIVENTRICULAR;  Surgeon: Jim Kwong MD;  Location: [facility] EP LAB;  Service: Cardiology;  Laterality: N/A;  NICM, CRT-D, SJM,, MAC, DM, 3 Prep*Wearing LifeVest*    oral extraction  2018    TESTICLE SURGERY         Family History  Family History   Problem Relation Age of Onset    Epilepsy Mother         Social History  Social History     Socioeconomic History    Marital status:    Tobacco Use    Smoking status: Former Smoker     Packs/day: 0.50     Years: 40.00     Pack years: 20.00     Types: Cigarettes, Cigars     Quit date:      Years since quittin.5    Smokeless tobacco: Never Used   Substance and Sexual Activity    Alcohol use: Yes     Comment: once a month    Drug use: No    Sexual activity: Yes     Birth control/protection: None     Comment: uses protection sometimes       Current Medications  Current Outpatient Medications on File Prior to Visit   Medication Sig Dispense Refill    amiodarone (PACERONE) 200 MG Tab TAKE 1 TABLET EVERY DAY 90 tablet 0    aspirin (ECOTRIN) 325 MG EC tablet Take 1 tablet (325 mg total) by mouth once daily. 90 tablet 3    metoprolol succinate (TOPROL-XL) 50 MG 24 hr tablet TAKE 1 TABLET EVERY DAY 90 tablet 3    pravastatin (PRAVACHOL) 80 MG tablet TAKE 1 TABLET EVERY DAY 90 tablet 3    sacubitril/valsartan (ENTRESTO ORAL) Take 1 tablet by mouth once daily. Mg unknown, did not bring.      [DISCONTINUED] furosemide (LASIX) 80 MG tablet One tablet twice a day every other day 180 tablet 1    [DISCONTINUED] potassium chloride (K-TAB) 20 mEq Take 1 tablet (20 mEq total) by mouth once daily. 90 tablet 3    [DISCONTINUED] spironolactone (ALDACTONE) 25 MG tablet Take 0.5 tablets (12.5 mg total) by mouth once daily. Hold if SBP < 110 90 tablet 1    gabapentin (NEURONTIN) 100 MG capsule Take 1 capsule (100 mg total) by mouth 3 (three) times daily. Take 100 mg by mouth 3 (three) times daily. 270 capsule 1    [DISCONTINUED] ramipril (ALTACE) 10 MG capsule Take 1 capsule (10 mg total) by mouth once daily. 90 capsule 3     No current facility-administered medications on file prior to visit.       Allergies   Review of patient's allergies indicates:  No Known Allergies    Review of Systems (Pertinent positives)  Review of Systems   Constitutional: Negative.   "Negative for chills, fever and weight loss.   HENT: Negative.    Eyes: Negative.    Respiratory: Negative for cough and hemoptysis.    Cardiovascular: Positive for leg swelling (chronic). Negative for chest pain, palpitations, orthopnea, claudication and PND.   Gastrointestinal: Negative.  Negative for abdominal pain, heartburn, nausea and vomiting.   Genitourinary: Negative.    Musculoskeletal: Negative.    Skin: Negative.    Neurological: Negative.  Negative for dizziness, tingling, tremors and headaches.   Endo/Heme/Allergies: Negative.    Psychiatric/Behavioral: Negative.        /72 (BP Location: Right arm, Patient Position: Sitting, BP Method: Large (Manual))   Pulse 60   Temp 97.8 °F (36.6 °C) (Oral)   Resp 18   Ht 6' 5" (1.956 m)   Wt (!) 136.3 kg (300 lb 7.8 oz)   SpO2 98%   BMI 35.63 kg/m²     Physical Exam  Vitals reviewed.   Constitutional:       General: He is not in acute distress.     Appearance: Normal appearance. He is not ill-appearing, toxic-appearing or diaphoretic.   HENT:      Head: Normocephalic and atraumatic.   Cardiovascular:      Rate and Rhythm: Normal rate and regular rhythm.      Pulses: Normal pulses.      Heart sounds: Normal heart sounds.   Pulmonary:      Effort: Pulmonary effort is normal.      Breath sounds: Normal breath sounds. No wheezing or rales.   Abdominal:      General: Bowel sounds are normal. There is no distension.      Palpations: Abdomen is soft. There is no mass.      Tenderness: There is no abdominal tenderness.   Musculoskeletal:      Cervical back: Normal range of motion and neck supple.      Right lower leg: No edema.      Left lower leg: No edema.   Skin:     General: Skin is warm and dry.      Capillary Refill: Capillary refill takes less than 2 seconds.   Neurological:      General: No focal deficit present.      Mental Status: He is alert and oriented to person, place, and time.   Psychiatric:         Mood and Affect: Mood normal.         " Behavior: Behavior normal.          Assessment/Plan:  Papito Bhakta is a 67 y.o. male who presents today for :    Diagnoses and all orders for this visit:    NICM (nonischemic cardiomyopathy)  -     COMPREHENSIVE METABOLIC PANEL; Future  -     Lipid Panel; Future  -     CBC W/ AUTO DIFFERENTIAL; Future    Chronic combined systolic and diastolic congestive heart failure  -     furosemide (LASIX) 80 MG tablet; Take 1 tablet (80 mg total) by mouth once daily. One tablet twice a day every other day    Essential hypertension  -     COMPREHENSIVE METABOLIC PANEL; Future  -     Lipid Panel; Future  -     TSH; Future  -     CBC W/ AUTO DIFFERENTIAL; Future  -     spironolactone (ALDACTONE) 25 MG tablet; Take 0.5 tablets (12.5 mg total) by mouth once daily. Hold if SBP < 110    Mixed hyperlipidemia  -     COMPREHENSIVE METABOLIC PANEL; Future  -     Lipid Panel; Future    Prediabetes  -     HEMOGLOBIN A1C; Future    History of low potassium  -     potassium chloride (K-TAB) 20 mEq; Take 1 tablet (20 mEq total) by mouth once daily.    Encounter for abdominal aortic aneurysm screening  -     US Abdominal Aorta; Future    - meds refilled  - check fasting labs  - check abd us - former smoker  - f/u 3 months        This office note has been dictated.  This dictation has been generated using M-Modal Fluency Direct dictation; some phonetic errors may occur.   My collaborating physician is Dr. Ovidio Yee.

## 2022-07-18 ENCOUNTER — LAB VISIT (OUTPATIENT)
Dept: LAB | Facility: HOSPITAL | Age: 67
End: 2022-07-18
Attending: FAMILY MEDICINE
Payer: MEDICARE

## 2022-07-18 DIAGNOSIS — R73.03 PREDIABETES: ICD-10-CM

## 2022-07-18 DIAGNOSIS — E78.2 MIXED HYPERLIPIDEMIA: Chronic | ICD-10-CM

## 2022-07-18 DIAGNOSIS — I10 ESSENTIAL HYPERTENSION: Chronic | ICD-10-CM

## 2022-07-18 DIAGNOSIS — I42.8 NICM (NONISCHEMIC CARDIOMYOPATHY): Chronic | ICD-10-CM

## 2022-07-18 LAB
ALBUMIN SERPL BCP-MCNC: 3.8 G/DL (ref 3.5–5.2)
ALP SERPL-CCNC: 53 U/L (ref 55–135)
ALT SERPL W/O P-5'-P-CCNC: 9 U/L (ref 10–44)
ANION GAP SERPL CALC-SCNC: 10 MMOL/L (ref 8–16)
AST SERPL-CCNC: 15 U/L (ref 10–40)
BASOPHILS # BLD AUTO: 0.02 K/UL (ref 0–0.2)
BASOPHILS NFR BLD: 0.4 % (ref 0–1.9)
BILIRUB SERPL-MCNC: 1.1 MG/DL (ref 0.1–1)
BUN SERPL-MCNC: 21 MG/DL (ref 8–23)
CALCIUM SERPL-MCNC: 9.5 MG/DL (ref 8.7–10.5)
CHLORIDE SERPL-SCNC: 105 MMOL/L (ref 95–110)
CHOLEST SERPL-MCNC: 148 MG/DL (ref 120–199)
CHOLEST/HDLC SERPL: 2.9 {RATIO} (ref 2–5)
CO2 SERPL-SCNC: 23 MMOL/L (ref 23–29)
CREAT SERPL-MCNC: 1.5 MG/DL (ref 0.5–1.4)
DIFFERENTIAL METHOD: ABNORMAL
EOSINOPHIL # BLD AUTO: 0.1 K/UL (ref 0–0.5)
EOSINOPHIL NFR BLD: 2.2 % (ref 0–8)
ERYTHROCYTE [DISTWIDTH] IN BLOOD BY AUTOMATED COUNT: 13.9 % (ref 11.5–14.5)
EST. GFR  (AFRICAN AMERICAN): 54.9 ML/MIN/1.73 M^2
EST. GFR  (NON AFRICAN AMERICAN): 47.5 ML/MIN/1.73 M^2
ESTIMATED AVG GLUCOSE: 114 MG/DL (ref 68–131)
GLUCOSE SERPL-MCNC: 94 MG/DL (ref 70–110)
HBA1C MFR BLD: 5.6 % (ref 4–5.6)
HCT VFR BLD AUTO: 46.4 % (ref 40–54)
HDLC SERPL-MCNC: 51 MG/DL (ref 40–75)
HDLC SERPL: 34.5 % (ref 20–50)
HGB BLD-MCNC: 13.7 G/DL (ref 14–18)
IMM GRANULOCYTES # BLD AUTO: 0 K/UL (ref 0–0.04)
IMM GRANULOCYTES NFR BLD AUTO: 0 % (ref 0–0.5)
LDLC SERPL CALC-MCNC: 79 MG/DL (ref 63–159)
LYMPHOCYTES # BLD AUTO: 1.3 K/UL (ref 1–4.8)
LYMPHOCYTES NFR BLD: 25.7 % (ref 18–48)
MCH RBC QN AUTO: 25.8 PG (ref 27–31)
MCHC RBC AUTO-ENTMCNC: 29.5 G/DL (ref 32–36)
MCV RBC AUTO: 88 FL (ref 82–98)
MONOCYTES # BLD AUTO: 0.5 K/UL (ref 0.3–1)
MONOCYTES NFR BLD: 10.4 % (ref 4–15)
NEUTROPHILS # BLD AUTO: 3.1 K/UL (ref 1.8–7.7)
NEUTROPHILS NFR BLD: 61.3 % (ref 38–73)
NONHDLC SERPL-MCNC: 97 MG/DL
NRBC BLD-RTO: 0 /100 WBC
PLATELET # BLD AUTO: 157 K/UL (ref 150–450)
PMV BLD AUTO: 12.9 FL (ref 9.2–12.9)
POTASSIUM SERPL-SCNC: 4.6 MMOL/L (ref 3.5–5.1)
PROT SERPL-MCNC: 7.6 G/DL (ref 6–8.4)
RBC # BLD AUTO: 5.3 M/UL (ref 4.6–6.2)
SODIUM SERPL-SCNC: 138 MMOL/L (ref 136–145)
TRIGL SERPL-MCNC: 90 MG/DL (ref 30–150)
TSH SERPL DL<=0.005 MIU/L-ACNC: 1.73 UIU/ML (ref 0.4–4)
WBC # BLD AUTO: 5.01 K/UL (ref 3.9–12.7)

## 2022-07-18 PROCEDURE — 84443 ASSAY THYROID STIM HORMONE: CPT | Performed by: NURSE PRACTITIONER

## 2022-07-18 PROCEDURE — 80061 LIPID PANEL: CPT | Performed by: NURSE PRACTITIONER

## 2022-07-18 PROCEDURE — 85025 COMPLETE CBC W/AUTO DIFF WBC: CPT | Performed by: NURSE PRACTITIONER

## 2022-07-18 PROCEDURE — 36415 COLL VENOUS BLD VENIPUNCTURE: CPT | Mod: PO | Performed by: NURSE PRACTITIONER

## 2022-07-18 PROCEDURE — 83036 HEMOGLOBIN GLYCOSYLATED A1C: CPT | Performed by: NURSE PRACTITIONER

## 2022-07-18 PROCEDURE — 80053 COMPREHEN METABOLIC PANEL: CPT | Performed by: NURSE PRACTITIONER

## 2022-07-22 ENCOUNTER — TELEPHONE (OUTPATIENT)
Dept: FAMILY MEDICINE | Facility: CLINIC | Age: 67
End: 2022-07-22
Payer: MEDICARE

## 2022-07-22 DIAGNOSIS — R94.4 DECREASED GFR: Primary | ICD-10-CM

## 2022-07-22 NOTE — TELEPHONE ENCOUNTER
----- Message from Ozzy Martines NP sent at 7/22/2022  4:10 PM CDT -----    Please call patient let him know that his labs look good except his kidney function is a little decreased from prior.  Please make sure the patient stays hydrated.   I would like to recheck this in a couple weeks.  Thank you please schedule BMP for that time.  Thank you

## 2022-07-22 NOTE — TELEPHONE ENCOUNTER
Rt patient call and explained our staff called him but wasn't able to reach him , His daughter Annie assisted us with scheduling his repeat bmp for 8/5 at 11:15 am .

## 2022-07-22 NOTE — TELEPHONE ENCOUNTER
----- Message from Pao Villeda sent at 7/22/2022  4:26 PM CDT -----  Type:  Patient Returning Call    Who Called: self     Who Left Message for Patient: Joselo Fried RN     Does the patient know what this is regarding?: no     Would the patient rather a call back or a response via [facility]? Call back     Best Call Back Number: [phone]

## 2022-07-26 ENCOUNTER — HOSPITAL ENCOUNTER (OUTPATIENT)
Dept: RADIOLOGY | Facility: HOSPITAL | Age: 67
Discharge: HOME | End: 2022-07-26
Attending: NURSE PRACTITIONER
Payer: MEDICARE

## 2022-07-26 DIAGNOSIS — Z13.6 ENCOUNTER FOR ABDOMINAL AORTIC ANEURYSM SCREENING: ICD-10-CM

## 2022-07-26 PROCEDURE — 76775 US EXAM ABDO BACK WALL LIM: CPT | Mod: 26,,, | Performed by: RADIOLOGY

## 2022-07-26 PROCEDURE — 76775 US EXAM ABDO BACK WALL LIM: CPT | Mod: TC

## 2022-07-26 PROCEDURE — 76775 US ABDOMINAL AORTA: ICD-10-PCS | Mod: 26,,, | Performed by: RADIOLOGY

## 2022-07-27 ENCOUNTER — TELEPHONE (OUTPATIENT)
Dept: FAMILY MEDICINE | Facility: CLINIC | Age: 67
End: 2022-07-27
Payer: MEDICARE

## 2022-07-27 NOTE — TELEPHONE ENCOUNTER
Please advise and clarify order; instructions say: Take 1 tablet (80 mg total) by mouth once daily. One tablet twice a day every other day,

## 2022-07-27 NOTE — TELEPHONE ENCOUNTER
----- Message from Burton aGgnon sent at 7/27/2022  3:24 PM CDT -----  Contact: [employer] mail / Elaine [phone]  Pharmacy is calling to clarify an RX.  RX name:  furosemide (LASIX) 80 MG tablet  What do they need to clarify:  Which direction is the right one  Comments:

## 2022-07-28 DIAGNOSIS — I50.42 CHRONIC COMBINED SYSTOLIC AND DIASTOLIC CONGESTIVE HEART FAILURE: ICD-10-CM

## 2022-07-28 RX ORDER — FUROSEMIDE 80 MG/1
80 TABLET ORAL DAILY
Qty: 90 TABLET | Refills: 0 | Status: SHIPPED | OUTPATIENT
Start: 2022-07-28 | End: 2022-09-08

## 2022-08-05 ENCOUNTER — LAB VISIT (OUTPATIENT)
Dept: LAB | Facility: HOSPITAL | Age: 67
End: 2022-08-05
Payer: MEDICARE

## 2022-08-05 DIAGNOSIS — R94.4 DECREASED GFR: ICD-10-CM

## 2022-08-05 LAB
ANION GAP SERPL CALC-SCNC: 8 MMOL/L (ref 8–16)
BUN SERPL-MCNC: 19 MG/DL (ref 8–23)
CALCIUM SERPL-MCNC: 9.6 MG/DL (ref 8.7–10.5)
CHLORIDE SERPL-SCNC: 103 MMOL/L (ref 95–110)
CO2 SERPL-SCNC: 28 MMOL/L (ref 23–29)
CREAT SERPL-MCNC: 1.5 MG/DL (ref 0.5–1.4)
EST. GFR  (NO RACE VARIABLE): 50.7 ML/MIN/1.73 M^2
GLUCOSE SERPL-MCNC: 86 MG/DL (ref 70–110)
POTASSIUM SERPL-SCNC: 4.3 MMOL/L (ref 3.5–5.1)
SODIUM SERPL-SCNC: 139 MMOL/L (ref 136–145)

## 2022-08-05 PROCEDURE — 36415 COLL VENOUS BLD VENIPUNCTURE: CPT | Mod: PO | Performed by: NURSE PRACTITIONER

## 2022-08-05 PROCEDURE — 80048 BASIC METABOLIC PNL TOTAL CA: CPT | Performed by: NURSE PRACTITIONER

## 2022-09-13 ENCOUNTER — OFFICE VISIT (OUTPATIENT)
Dept: PODIATRY | Facility: CLINIC | Age: 67
End: 2022-09-13
Payer: MEDICARE

## 2022-09-13 VITALS — BODY MASS INDEX: 35.48 KG/M2 | HEIGHT: 77 IN | WEIGHT: 300.5 LBS

## 2022-09-13 DIAGNOSIS — B35.1 ONYCHOMYCOSIS DUE TO DERMATOPHYTE: ICD-10-CM

## 2022-09-13 DIAGNOSIS — I73.9 PERIPHERAL VASCULAR DISEASE, UNSPECIFIED: Primary | ICD-10-CM

## 2022-09-13 DIAGNOSIS — L84 CORN OR CALLUS: ICD-10-CM

## 2022-09-13 PROCEDURE — 3044F PR MOST RECENT HEMOGLOBIN A1C LEVEL <7.0%: ICD-10-PCS | Mod: CPTII,S$GLB,, | Performed by: PODIATRIST

## 2022-09-13 PROCEDURE — 11056 PR TRIM BENIGN HYPERKERATOTIC SKIN LESION,2-4: ICD-10-PCS | Mod: Q9,S$GLB,, | Performed by: PODIATRIST

## 2022-09-13 PROCEDURE — 99499 NO LOS: ICD-10-PCS | Mod: S$GLB,,, | Performed by: PODIATRIST

## 2022-09-13 PROCEDURE — 1101F PT FALLS ASSESS-DOCD LE1/YR: CPT | Mod: CPTII,S$GLB,, | Performed by: PODIATRIST

## 2022-09-13 PROCEDURE — 3288F PR FALLS RISK ASSESSMENT DOCUMENTED: ICD-10-PCS | Mod: CPTII,S$GLB,, | Performed by: PODIATRIST

## 2022-09-13 PROCEDURE — 11056 PARNG/CUTG B9 HYPRKR LES 2-4: CPT | Mod: Q9,S$GLB,, | Performed by: PODIATRIST

## 2022-09-13 PROCEDURE — 3008F BODY MASS INDEX DOCD: CPT | Mod: CPTII,S$GLB,, | Performed by: PODIATRIST

## 2022-09-13 PROCEDURE — 99499 UNLISTED E&M SERVICE: CPT | Mod: S$GLB,,, | Performed by: PODIATRIST

## 2022-09-13 PROCEDURE — 11721 DEBRIDE NAIL 6 OR MORE: CPT | Mod: 59,Q9,S$GLB, | Performed by: PODIATRIST

## 2022-09-13 PROCEDURE — 1159F PR MEDICATION LIST DOCUMENTED IN MEDICAL RECORD: ICD-10-PCS | Mod: CPTII,S$GLB,, | Performed by: PODIATRIST

## 2022-09-13 PROCEDURE — 1159F MED LIST DOCD IN RCRD: CPT | Mod: CPTII,S$GLB,, | Performed by: PODIATRIST

## 2022-09-13 PROCEDURE — 1126F PR PAIN SEVERITY QUANTIFIED, NO PAIN PRESENT: ICD-10-PCS | Mod: CPTII,S$GLB,, | Performed by: PODIATRIST

## 2022-09-13 PROCEDURE — 11721 PR DEBRIDEMENT OF NAILS, 6 OR MORE: ICD-10-PCS | Mod: 59,Q9,S$GLB, | Performed by: PODIATRIST

## 2022-09-13 PROCEDURE — 3008F PR BODY MASS INDEX (BMI) DOCUMENTED: ICD-10-PCS | Mod: CPTII,S$GLB,, | Performed by: PODIATRIST

## 2022-09-13 PROCEDURE — 1126F AMNT PAIN NOTED NONE PRSNT: CPT | Mod: CPTII,S$GLB,, | Performed by: PODIATRIST

## 2022-09-13 PROCEDURE — 1101F PR PT FALLS ASSESS DOC 0-1 FALLS W/OUT INJ PAST YR: ICD-10-PCS | Mod: CPTII,S$GLB,, | Performed by: PODIATRIST

## 2022-09-13 PROCEDURE — 99999 PR PBB SHADOW E&M-EST. PATIENT-LVL III: ICD-10-PCS | Mod: PBBFAC,,, | Performed by: PODIATRIST

## 2022-09-13 PROCEDURE — 3288F FALL RISK ASSESSMENT DOCD: CPT | Mod: CPTII,S$GLB,, | Performed by: PODIATRIST

## 2022-09-13 PROCEDURE — 99999 PR PBB SHADOW E&M-EST. PATIENT-LVL III: CPT | Mod: PBBFAC,,, | Performed by: PODIATRIST

## 2022-09-13 PROCEDURE — 3044F HG A1C LEVEL LT 7.0%: CPT | Mod: CPTII,S$GLB,, | Performed by: PODIATRIST

## 2022-09-13 NOTE — PROGRESS NOTES
Subjective:      Patient ID: Papito Bhakta is a 67 y.o. male.    Chief Complaint: Nail Care and Peripheral Vascular Disease (7/15/22 Conrad)    Papito is a 67 y.o. male who presents to the clinic for evaluation and treatment of high risk feet. Papito has a past medical history of RIGOBERTO (acute kidney injury) (10/7/2020), Anticoagulant long-term use, CHF (congestive heart failure), Hyperlipidemia, Hypertension, NICM (nonischemic cardiomyopathy) (6/16/2020), Obesity, AMELIA (obstructive sleep apnea) (1/7/2022), and Peripheral vascular disease, unspecified (2/1/2021). The patient's chief complaint is long, thick toenails. This patient has documented high risk feet requiring routine maintenance secondary to peripheral vascular disease. Reports elongated nails that are difficult to trim. Painful callus right plantar foot.     PCP: Brynn To MD    Date Last Seen by PCP: 2/14/22    Current shoe gear:  Affected Foot: Tennis shoes     Unaffected Foot: Tennis shoes    Last encounter in this department: Visit date not found    Hemoglobin A1C   Date Value Ref Range Status   07/18/2022 5.6 4.0 - 5.6 % Final     Comment:     ADA Screening Guidelines:  5.7-6.4%  Consistent with prediabetes  >or=6.5%  Consistent with diabetes    High levels of fetal hemoglobin interfere with the HbA1C  assay. Heterozygous hemoglobin variants (HbS, HgC, etc)do  not significantly interfere with this assay.   However, presence of multiple variants may affect accuracy.     11/19/2020 6.2 (H) 4.0 - 5.6 % Final     Comment:     ADA Screening Guidelines:  5.7-6.4%  Consistent with prediabetes  >or=6.5%  Consistent with diabetes  High levels of fetal hemoglobin interfere with the HbA1C  assay. Heterozygous hemoglobin variants (HbS, HgC, etc)do  not significantly interfere with this assay.   However, presence of multiple variants may affect accuracy.         Review of Systems   Constitutional: Negative for chills.   Cardiovascular:  Negative for chest pain  and claudication.   Respiratory:  Negative for cough.    Skin:  Positive for color change, dry skin and nail changes.   Musculoskeletal:  Positive for joint pain.   Gastrointestinal:  Negative for nausea.   Neurological:  Positive for paresthesias. Negative for numbness.   Psychiatric/Behavioral:  The patient is not nervous/anxious.          Objective:      Physical Exam  Constitutional:       Appearance: He is well-developed.   Cardiovascular:      Comments: Dorsalis pedis and posterior tibial pulses are diminished Bilaterally. Toes are cool to touch. Feet are warm proximally.There is decreased digital hair. Skin is atrophic, slightly hyperpigmented, and mildly edematous   Pulmonary:      Effort: No respiratory distress.   Musculoskeletal:         General: Tenderness present.      Comments: Adequate joint range of motion without pain, limitation, nor crepitation Bilateral feet and ankle joints. Muscle strength is 5/5 in all groups bilaterally.    Feet:      Right foot:      Skin integrity: Callus and dry skin present. No ulcer or skin breakdown.      Left foot:      Skin integrity: Dry skin present. No ulcer.   Skin:     General: Skin is dry.      Findings: No erythema.      Comments: Nails x10 are elongated by 2-6mm's, thickened by 3-5 mm's, dystrophic, and are darkened in coloration . Xerosis Bilaterally. No open lesions noted     Focal hyperkeratotic lesion consisting entirely of hyperkeratotic tissue without open skin, drainage, pus, fluctuance, malodor, or signs of infection: right plantar foot right plantar 2nd toe      Neurological:      Mental Status: He is alert.      Comments: Kimberly-Sean 5.07 monofilamant testing is diminished Wilman feet. Sharp/dull sensation diminished Bilaterally. Light touch absent Bilaterally.              Assessment:       No diagnosis found.        Plan:       There are no diagnoses linked to this encounter.    I counseled the patient on his conditions, their implications and  medical management.      - Shoe inspection. Diabetic Foot Education. Patient reminded of the importance of good nutrition and blood sugar control to help prevent podiatric complications of diabetes. Patient instructed on proper foot hygeine. We discussed wearing proper shoe gear, daily foot inspections, never walking without protective shoe gear, never putting sharp instruments to feet, routine podiatric nail visits every 2-3 months.   - With patient's permission, nails were aggressively reduced and debrided x 10 to their soft tissue attachment mechanically and with electric , removing all offending nail and debris. Patient relates relief following the procedure. He will continue to monitor the areas daily, inspect his feet, wear protective shoe gear when ambulatory, moisturizer to maintain skin integrity and follow in this office in approximately 2-3 months, sooner p.r.n.     - After cleansing the area w/ alcohol prep pad the above mentioned hyperkeratosis was trimmed utilizing No 3 curette right plantar foot.

## 2022-09-17 ENCOUNTER — CLINICAL SUPPORT (OUTPATIENT)
Dept: CARDIOLOGY | Facility: HOSPITAL | Age: 67
End: 2022-09-17
Payer: MEDICARE

## 2022-09-17 DIAGNOSIS — Z95.810 PRESENCE OF AUTOMATIC (IMPLANTABLE) CARDIAC DEFIBRILLATOR: ICD-10-CM

## 2022-09-17 DIAGNOSIS — I42.9 CARDIOMYOPATHY, UNSPECIFIED: ICD-10-CM

## 2022-09-17 PROCEDURE — 93296 REM INTERROG EVL PM/IDS: CPT | Performed by: INTERNAL MEDICINE

## 2022-10-14 ENCOUNTER — OFFICE VISIT (OUTPATIENT)
Dept: FAMILY MEDICINE | Facility: CLINIC | Age: 67
End: 2022-10-14
Payer: MEDICARE

## 2022-10-14 ENCOUNTER — LAB VISIT (OUTPATIENT)
Dept: LAB | Facility: HOSPITAL | Age: 67
End: 2022-10-14
Attending: FAMILY MEDICINE
Payer: MEDICARE

## 2022-10-14 VITALS
HEIGHT: 77 IN | RESPIRATION RATE: 16 BRPM | SYSTOLIC BLOOD PRESSURE: 126 MMHG | HEART RATE: 60 BPM | TEMPERATURE: 99 F | BODY MASS INDEX: 32.02 KG/M2 | WEIGHT: 271.19 LBS | DIASTOLIC BLOOD PRESSURE: 74 MMHG | OXYGEN SATURATION: 98 %

## 2022-10-14 DIAGNOSIS — I50.42 CHRONIC COMBINED SYSTOLIC AND DIASTOLIC CONGESTIVE HEART FAILURE: Primary | ICD-10-CM

## 2022-10-14 DIAGNOSIS — R94.4 DECREASED GFR: ICD-10-CM

## 2022-10-14 DIAGNOSIS — I10 ESSENTIAL HYPERTENSION: Chronic | ICD-10-CM

## 2022-10-14 DIAGNOSIS — Z23 NEED FOR IMMUNIZATION AGAINST INFLUENZA: ICD-10-CM

## 2022-10-14 DIAGNOSIS — H00.014 HORDEOLUM EXTERNUM OF LEFT UPPER EYELID: ICD-10-CM

## 2022-10-14 DIAGNOSIS — Z71.89 ADVANCED CARE PLANNING/COUNSELING DISCUSSION: ICD-10-CM

## 2022-10-14 LAB
ALBUMIN SERPL BCP-MCNC: 3.9 G/DL (ref 3.5–5.2)
ALP SERPL-CCNC: 56 U/L (ref 55–135)
ALT SERPL W/O P-5'-P-CCNC: 10 U/L (ref 10–44)
ANION GAP SERPL CALC-SCNC: 10 MMOL/L (ref 8–16)
AST SERPL-CCNC: 15 U/L (ref 10–40)
BILIRUB SERPL-MCNC: 0.8 MG/DL (ref 0.1–1)
BUN SERPL-MCNC: 21 MG/DL (ref 8–23)
CALCIUM SERPL-MCNC: 9.5 MG/DL (ref 8.7–10.5)
CHLORIDE SERPL-SCNC: 106 MMOL/L (ref 95–110)
CO2 SERPL-SCNC: 24 MMOL/L (ref 23–29)
CREAT SERPL-MCNC: 1.4 MG/DL (ref 0.5–1.4)
EST. GFR  (NO RACE VARIABLE): 55 ML/MIN/1.73 M^2
GLUCOSE SERPL-MCNC: 86 MG/DL (ref 70–110)
POTASSIUM SERPL-SCNC: 4.4 MMOL/L (ref 3.5–5.1)
PROT SERPL-MCNC: 7.7 G/DL (ref 6–8.4)
SODIUM SERPL-SCNC: 140 MMOL/L (ref 136–145)

## 2022-10-14 PROCEDURE — G0008 ADMIN INFLUENZA VIRUS VAC: HCPCS | Mod: S$GLB,,, | Performed by: FAMILY MEDICINE

## 2022-10-14 PROCEDURE — 80053 COMPREHEN METABOLIC PANEL: CPT | Performed by: FAMILY MEDICINE

## 2022-10-14 PROCEDURE — 1159F PR MEDICATION LIST DOCUMENTED IN MEDICAL RECORD: ICD-10-PCS | Mod: CPTII,S$GLB,, | Performed by: FAMILY MEDICINE

## 2022-10-14 PROCEDURE — 90694 FLU VACCINE - QUADRIVALENT - ADJUVANTED: ICD-10-PCS | Mod: S$GLB,,, | Performed by: FAMILY MEDICINE

## 2022-10-14 PROCEDURE — 3288F PR FALLS RISK ASSESSMENT DOCUMENTED: ICD-10-PCS | Mod: CPTII,S$GLB,, | Performed by: FAMILY MEDICINE

## 2022-10-14 PROCEDURE — 1101F PR PT FALLS ASSESS DOC 0-1 FALLS W/OUT INJ PAST YR: ICD-10-PCS | Mod: CPTII,S$GLB,, | Performed by: FAMILY MEDICINE

## 2022-10-14 PROCEDURE — 3044F PR MOST RECENT HEMOGLOBIN A1C LEVEL <7.0%: ICD-10-PCS | Mod: CPTII,S$GLB,, | Performed by: FAMILY MEDICINE

## 2022-10-14 PROCEDURE — 3044F HG A1C LEVEL LT 7.0%: CPT | Mod: CPTII,S$GLB,, | Performed by: FAMILY MEDICINE

## 2022-10-14 PROCEDURE — 1126F AMNT PAIN NOTED NONE PRSNT: CPT | Mod: CPTII,S$GLB,, | Performed by: FAMILY MEDICINE

## 2022-10-14 PROCEDURE — 3078F DIAST BP <80 MM HG: CPT | Mod: CPTII,S$GLB,, | Performed by: FAMILY MEDICINE

## 2022-10-14 PROCEDURE — 1159F MED LIST DOCD IN RCRD: CPT | Mod: CPTII,S$GLB,, | Performed by: FAMILY MEDICINE

## 2022-10-14 PROCEDURE — 3078F PR MOST RECENT DIASTOLIC BLOOD PRESSURE < 80 MM HG: ICD-10-PCS | Mod: CPTII,S$GLB,, | Performed by: FAMILY MEDICINE

## 2022-10-14 PROCEDURE — 99999 PR PBB SHADOW E&M-EST. PATIENT-LVL IV: CPT | Mod: PBBFAC,,, | Performed by: FAMILY MEDICINE

## 2022-10-14 PROCEDURE — 99999 PR PBB SHADOW E&M-EST. PATIENT-LVL IV: ICD-10-PCS | Mod: PBBFAC,,, | Performed by: FAMILY MEDICINE

## 2022-10-14 PROCEDURE — 99214 PR OFFICE/OUTPT VISIT, EST, LEVL IV, 30-39 MIN: ICD-10-PCS | Mod: S$GLB,,, | Performed by: FAMILY MEDICINE

## 2022-10-14 PROCEDURE — G0008 FLU VACCINE - QUADRIVALENT - ADJUVANTED: ICD-10-PCS | Mod: S$GLB,,, | Performed by: FAMILY MEDICINE

## 2022-10-14 PROCEDURE — 1101F PT FALLS ASSESS-DOCD LE1/YR: CPT | Mod: CPTII,S$GLB,, | Performed by: FAMILY MEDICINE

## 2022-10-14 PROCEDURE — 1126F PR PAIN SEVERITY QUANTIFIED, NO PAIN PRESENT: ICD-10-PCS | Mod: CPTII,S$GLB,, | Performed by: FAMILY MEDICINE

## 2022-10-14 PROCEDURE — 90694 VACC AIIV4 NO PRSRV 0.5ML IM: CPT | Mod: S$GLB,,, | Performed by: FAMILY MEDICINE

## 2022-10-14 PROCEDURE — 36415 COLL VENOUS BLD VENIPUNCTURE: CPT | Mod: PN | Performed by: FAMILY MEDICINE

## 2022-10-14 PROCEDURE — 3074F PR MOST RECENT SYSTOLIC BLOOD PRESSURE < 130 MM HG: ICD-10-PCS | Mod: CPTII,S$GLB,, | Performed by: FAMILY MEDICINE

## 2022-10-14 PROCEDURE — 3074F SYST BP LT 130 MM HG: CPT | Mod: CPTII,S$GLB,, | Performed by: FAMILY MEDICINE

## 2022-10-14 PROCEDURE — 99214 OFFICE O/P EST MOD 30 MIN: CPT | Mod: S$GLB,,, | Performed by: FAMILY MEDICINE

## 2022-10-14 PROCEDURE — 3288F FALL RISK ASSESSMENT DOCD: CPT | Mod: CPTII,S$GLB,, | Performed by: FAMILY MEDICINE

## 2022-10-14 NOTE — PROGRESS NOTES
Patient given flu vaccine to right deltoid, no complaints or reactions noted. Vis given 8/6/21 to patient. Instructed to wait in clinic for 15 minutes to note for reactions, verbalized understanding.

## 2022-10-14 NOTE — PROGRESS NOTES
Chief Complaint   Patient presents with    Hypertension       HPI  Papito Bhakta is a 67 y.o. male with multiple medical diagnoses as listed in the medical history and problem list that presents for follow-up for CHF      He is taking 80mg of lasix once a day and an extra half tablet every other day  He has not had any increased swelling        ALLERGIES AND MEDICATIONS: updated and reviewed.  Review of patient's allergies indicates:  No Known Allergies  Medication List with Changes/Refills   Current Medications    AMIODARONE (PACERONE) 200 MG TAB    TAKE 1 TABLET EVERY DAY    ASPIRIN (ECOTRIN) 325 MG EC TABLET    Take 1 tablet (325 mg total) by mouth once daily.    FUROSEMIDE (LASIX) 80 MG TABLET    TAKE 1 TABLET EVERY DAY    GABAPENTIN (NEURONTIN) 100 MG CAPSULE    Take 1 capsule (100 mg total) by mouth 3 (three) times daily. Take 100 mg by mouth 3 (three) times daily.    METOPROLOL SUCCINATE (TOPROL-XL) 50 MG 24 HR TABLET    TAKE 1 TABLET EVERY DAY    POTASSIUM CHLORIDE (K-TAB) 20 MEQ    TAKE 1 TABLET EVERY DAY    PRAVASTATIN (PRAVACHOL) 80 MG TABLET    TAKE 1 TABLET EVERY DAY    SACUBITRIL/VALSARTAN (ENTRESTO ORAL)    Take 1 tablet by mouth once daily. Mg unknown, did not bring.    SPIRONOLACTONE (ALDACTONE) 25 MG TABLET    TAKE 1/2 TABLET (12.5 MG TOTAL) ONCE DAILY. HOLD IF SYSTOLIC BLOOD PRESSURE IS LESS THAN 110       ROS  Review of Systems   Constitutional:  Negative for chills, fatigue, fever and unexpected weight change.   HENT:  Negative for ear pain, postnasal drip, rhinorrhea, sinus pressure and sore throat.    Eyes:  Negative for photophobia and visual disturbance.   Respiratory:  Negative for apnea, cough, chest tightness, shortness of breath and wheezing.    Cardiovascular:  Positive for leg swelling. Negative for chest pain and palpitations.   Gastrointestinal:  Negative for abdominal pain, blood in stool, constipation, diarrhea, nausea and vomiting.   Genitourinary:  Negative for difficulty  "urinating.   Musculoskeletal:  Negative for arthralgias and joint swelling.   Skin:  Negative for rash.   Neurological:  Negative for facial asymmetry, speech difficulty, weakness, numbness and headaches.   Psychiatric/Behavioral:  Negative for dysphoric mood.      Physical Exam  Vitals:    10/14/22 0914   BP: 126/74   Pulse: 60   Resp: 16   Temp: 98.5 °F (36.9 °C)   TempSrc: Oral   SpO2: 98%   Weight: 123 kg (271 lb 2.7 oz)   Height: 6' 5" (1.956 m)    Body mass index is 32.16 kg/m².  Weight: 123 kg (271 lb 2.7 oz)   Height: 6' 5" (195.6 cm)     Physical Exam  Vitals and nursing note reviewed.   Constitutional:       Appearance: He is well-developed.   HENT:      Head: Normocephalic and atraumatic.      Mouth/Throat:      Pharynx: No oropharyngeal exudate.   Cardiovascular:      Rate and Rhythm: Normal rate and regular rhythm.      Heart sounds: Normal heart sounds. No murmur heard.    No friction rub. No gallop.   Pulmonary:      Effort: Pulmonary effort is normal. No respiratory distress.      Breath sounds: Normal breath sounds. No wheezing or rales.   Chest:      Chest wall: No tenderness.   Lymphadenopathy:      Cervical: No cervical adenopathy.   Skin:     General: Skin is warm and dry.   Neurological:      Mental Status: He is alert. Mental status is at baseline.   Psychiatric:         Behavior: Behavior normal.       Health Maintenance         Date Due Completion Date    COVID-19 Vaccine (4 - Booster for Moderna series) 06/06/2022 4/11/2022    Influenza Vaccine (1) 09/01/2022 10/15/2021    Override on 8/25/2020: Done    LDCT Lung Screen 01/03/2023 1/3/2022    Pneumococcal Vaccines (Age 65+) (3 - PPSV23 if available, else PCV20) 06/06/2023 7/22/2020    Colorectal Cancer Screening 10/10/2026 10/10/2016 (Done)    Override on 10/10/2016: Done    Lipid Panel 07/18/2027 7/18/2022    TETANUS VACCINE 06/06/2028 6/6/2018            Health maintenance reviewed and addressed as ordered      ASSESSMENT     1. Chronic " combined systolic and diastolic congestive heart failure    2. Need for immunization against influenza    3. Decreased GFR    4. Hordeolum externum of left upper eyelid    5. Essential hypertension    6. Advanced care planning/counseling discussion        PLAN:     Problem List Items Addressed This Visit          Cardiac/Vascular    Essential hypertension (Chronic)  stable    Chronic combined systolic and diastolic congestive heart failure - Primary  Continue lasix 80mg with 40mg extra every other day     Other Visit Diagnoses       Need for immunization against influenza      as ordered       Relevant Orders    Influenza (FLUAD) - Quadrivalent (Adjuvanted) *Preferred* (65+) (PF) (Completed)    Decreased GFR      Repeat labs  Encourage hydration    Relevant Orders    Comprehensive Metabolic Panel    Hordeolum externum of left upper eyelid        Advanced care planning/counseling discussion      Reviewed that his daughter Annie is his primary decision maker  His goal is to be around for his grandchildren as he is helping to raise them, he is a full code  We completed a lapost today              Brynn To MD  10/14/2022 9:28 AM      Follow up in about 3 months (around 1/14/2023) for management of chronic conditions.

## 2022-10-20 NOTE — PROGRESS NOTES
"Mr. Bhakta is a patient of Dr. Kwong and was last seen in clinic 4/28/2022.      Subjective:   Patient ID:  Papito Bhakta is a 67 y.o. male who presents for follow-up of Pacemaker Check ([facility]  CRT-D)  .     HPI:    Mr. Bhakta is a 67 y.o. male with HTN, HLD, NICM s/p CRT-D (2/13/2019, extracted 6/17/2020, reimplanted rt side 9/28/2020) here for follow up.     Background:    Cardiologist: Makayla Long MD.    6/15/2020: Mr. Bhakta is a 65-year-old male with a past medical history significant for hypertension, hyperlipidemia, nonischemic cardiomyopathy status post Bi-V ICD 02/13/2019 [employer] with Dr. Shailesh Eng  EF 10%.  He notes increased drainage from his pocket site over the past 1 week.  Yesterday morning he was performing yard work and noticed pain in his left pectoral region with acute dehiscence of his pocket.     6/17/2020: Successful device extraction. Complex/difficult due to dense adhesions on lead, requiring use of a mechanical cutting sheath. Successful excision of necrotic/infected tissue (5x10x3 cm volume). Complex wound closure.  Plan: ABx per ID consult. Follow up with me in clinic at the end of the antibiotic course. LifeVest until a new right-sided biV ICD can be implanted.     Per EP, new ICD to be placed in mid-July or when cleared by ID.    ID appt 7/22/2020: "OK to reimplant PPM on opposite side per EP."    9/28/2020: Successful implantation of ICD BIV placed for primary intervention (challenging/difficult due to R sided anatomy, extremely large heart, acute subclavian/SVC angle).    12/30/2020: Defibrillator fired yesterday around 2 pm. Looks like he went into ventricular fibrillation. He says he was doing some floor exercises when he felt the shock. His beta blocker was increased.    1/15/2021: He is almost 4 mo s/p rt-sided CRT-D implantation. Had ICD discharge shortly thereafter due to MMVT. Continues to have NSVT episodes. Plan per Dr. Kwong is to increase metoprolol and start " amiodarone. Now taking metoprolol 50mg. BP too low for additional increase. Will add amiodarone per plan. Stable from device standpoint. 93% Biventricular pacing, which may increase if amio reduces PVC %. He is feeling well.     5/12/2021: Mr. Bhakta is doing well from a device perspective with stable lead and device function. Now on amiodarone for MMVT (started Jan 2021). No subsequent arrhythmia noted. 95% biventricular pacing. On GDMT. No CHF symptoms. He feels well.    4/28/2022: Mr. Bhakta is doing well from a device perspective with stable lead and device function. No arrhythmia noted. On amiodarone for VT with stable amio tests. 99% biventricular pacing. No hospitalizations for CHF since last clinic visit. On GDMT.    Update (10/27/2022):    Today he says he has been feeling well with no new cardiac complaints. Mr. Bhakta reports no chest pain with exertion or at rest, palpitations, SOB, KING, dizziness, or syncope.    He is currently being treated with amiodarone 200mg daily for rhythm (VT) control and toprol 50mg daily for HR control.  Kidney function is stable, with a creatinine of 1.4 on 10/14/2022. TSH WNL 7/2022. LFTs WNL 10/2022. PFTs 12/2021 DLCO ratio 70.8.    Device Interrogation (10/26/2022) reveals an intrinsic SB with stable lead and device function. No arrhythmias or treated episodes were noted.  He paces 71% in the RA and 96% in the BiV. Estimated battery longevity 5.1 years.     I have personally reviewed the patient's EKG today, which shows APVP at APBP at 60bpmbpm. MN interval is 212. QRS is 172. QTc is 512.    Relevant Cardiac Test Results:    2D Echo (9/13/2020):  The left ventricle is severely enlarged with severely decreased systolic function. The estimated ejection fraction is 18%.  There are segmental left ventricular wall motion abnormalities.  There is moderate left ventricular eccentric hypertrophy.  Grade III diastolic dysfunction.  Severe left atrial enlargement.  Moderate  "right ventricular enlargement.  Low normal right ventricular systolic function.  Severe right atrial enlargement.  Moderate mitral regurgitation.  Mild to moderate tricuspid regurgitation.  There is pulmonary hypertension at least in the 50s.    Current Outpatient Medications   Medication Sig    amiodarone (PACERONE) 200 MG Tab TAKE 1 TABLET EVERY DAY    aspirin (ECOTRIN) 325 MG EC tablet Take 1 tablet (325 mg total) by mouth once daily.    furosemide (LASIX) 80 MG tablet TAKE 1 TABLET EVERY DAY    gabapentin (NEURONTIN) 100 MG capsule Take 1 capsule (100 mg total) by mouth 3 (three) times daily. Take 100 mg by mouth 3 (three) times daily.    metoprolol succinate (TOPROL-XL) 50 MG 24 hr tablet TAKE 1 TABLET EVERY DAY    potassium chloride (K-TAB) 20 mEq TAKE 1 TABLET EVERY DAY    pravastatin (PRAVACHOL) 80 MG tablet TAKE 1 TABLET EVERY DAY    sacubitril/valsartan (ENTRESTO ORAL) Take 1 tablet by mouth once daily. Mg unknown, did not bring.    spironolactone (ALDACTONE) 25 MG tablet TAKE 1/2 TABLET (12.5 MG TOTAL) ONCE DAILY. HOLD IF SYSTOLIC BLOOD PRESSURE IS LESS THAN 110     No current facility-administered medications for this visit.       Review of Systems   Constitutional: Negative for malaise/fatigue.   Cardiovascular:  Negative for chest pain, dyspnea on exertion, irregular heartbeat, leg swelling and palpitations.   Respiratory:  Negative for shortness of breath.    Hematologic/Lymphatic: Negative for bleeding problem.   Skin:  Negative for rash.   Musculoskeletal:  Negative for myalgias.   Gastrointestinal:  Negative for hematemesis, hematochezia and nausea.   Genitourinary:  Negative for hematuria.   Neurological:  Negative for light-headedness.   Psychiatric/Behavioral:  Negative for altered mental status.    Allergic/Immunologic: Negative for persistent infections.     Objective:          /80 (BP Location: Left arm, Patient Position: Sitting, BP Method: Large (Manual))   Pulse 60   Ht 6' 5" " (1.956 m)   Wt (!) 137.1 kg (302 lb 4 oz)   BMI 35.84 kg/m²     Physical Exam  Vitals and nursing note reviewed.   Constitutional:       Appearance: Normal appearance. He is well-developed.   HENT:      Head: Normocephalic.      Nose: Nose normal.   Eyes:      Pupils: Pupils are equal, round, and reactive to light.   Cardiovascular:      Rate and Rhythm: Normal rate and regular rhythm.   Pulmonary:      Effort: No respiratory distress.      Breath sounds: Normal breath sounds.   Chest:      Comments: Device to RUCW. Incision and pocket in good repair.    Musculoskeletal:         General: Normal range of motion.   Skin:     General: Skin is warm and dry.      Findings: No erythema.   Neurological:      Mental Status: He is alert and oriented to person, place, and time.   Psychiatric:         Speech: Speech normal.         Behavior: Behavior normal.         Lab Results   Component Value Date     10/14/2022    K 4.4 10/14/2022    MG 1.7 11/21/2020    BUN 21 10/14/2022    CREATININE 1.4 10/14/2022    ALT 10 10/14/2022    AST 15 10/14/2022    HGB 13.7 (L) 07/18/2022    HCT 46.4 07/18/2022    HCT 37 09/28/2020    TSH 1.729 07/18/2022    LDLCALC 79.0 07/18/2022       Recent Labs   Lab 06/16/20  0745 06/17/20  0443 09/25/20  0940 03/31/21  1808   INR 1.1 1.1 1.2 1.0         Assessment:     1. Biventricular ICD (implantable cardioverter-defibrillator) in place    2. NICM (nonischemic cardiomyopathy)    3. Essential hypertension    4. Chronic combined systolic and diastolic congestive heart failure    5. Ventricular tachycardia    6. AMELIA (obstructive sleep apnea)    7. Severe obesity (BMI 35.0-39.9) with comorbidity    8. Long term current use of amiodarone      Plan:     In summary, Mr. Bhakta is a 67 y.o. male with HTN, HLD, NICM s/p CRT-D (2/13/2019, extracted 6/17/2020, reimplanted rt side 9/28/2020) here for follow up.   Mr. Bhakta is doing well from a device perspective with stable lead and device function.  No arrhythmia noted. On amiodarone for VT. Amio labs ok. 96% biventricular pacing. No CHF symptoms. He is feeling well.    Continue current medication regimen and device settings.   Follow up in device clinic as scheduled.   Follow up in EP clinic in 12 mo with amio testing, sooner as needed.     *A copy of this note has been sent to Dr. Kwong*    Follow up in about 1 year (around 10/26/2023).    ------------------------------------------------------------------    SERINA Riggs, NP-C  Cardiac Electrophysiology

## 2022-10-26 ENCOUNTER — OFFICE VISIT (OUTPATIENT)
Dept: ELECTROPHYSIOLOGY | Facility: CLINIC | Age: 67
End: 2022-10-26
Payer: MEDICARE

## 2022-10-26 ENCOUNTER — CLINICAL SUPPORT (OUTPATIENT)
Dept: CARDIOLOGY | Facility: HOSPITAL | Age: 67
End: 2022-10-26
Attending: INTERNAL MEDICINE
Payer: MEDICARE

## 2022-10-26 VITALS
HEART RATE: 60 BPM | BODY MASS INDEX: 35.69 KG/M2 | WEIGHT: 302.25 LBS | HEIGHT: 77 IN | DIASTOLIC BLOOD PRESSURE: 80 MMHG | SYSTOLIC BLOOD PRESSURE: 128 MMHG

## 2022-10-26 DIAGNOSIS — G47.33 OSA (OBSTRUCTIVE SLEEP APNEA): ICD-10-CM

## 2022-10-26 DIAGNOSIS — Z79.899 LONG TERM CURRENT USE OF AMIODARONE: ICD-10-CM

## 2022-10-26 DIAGNOSIS — I10 ESSENTIAL HYPERTENSION: Chronic | ICD-10-CM

## 2022-10-26 DIAGNOSIS — E66.01 SEVERE OBESITY (BMI 35.0-39.9) WITH COMORBIDITY: ICD-10-CM

## 2022-10-26 DIAGNOSIS — Z95.810 BIVENTRICULAR ICD (IMPLANTABLE CARDIOVERTER-DEFIBRILLATOR) IN PLACE: Primary | ICD-10-CM

## 2022-10-26 DIAGNOSIS — I49.8 OTHER SPECIFIED CARDIAC ARRHYTHMIAS: ICD-10-CM

## 2022-10-26 DIAGNOSIS — I50.42 CHRONIC COMBINED SYSTOLIC AND DIASTOLIC CONGESTIVE HEART FAILURE: ICD-10-CM

## 2022-10-26 DIAGNOSIS — I42.8 NICM (NONISCHEMIC CARDIOMYOPATHY): Chronic | ICD-10-CM

## 2022-10-26 DIAGNOSIS — I47.20 VENTRICULAR TACHYCARDIA: ICD-10-CM

## 2022-10-26 PROCEDURE — 93010 RHYTHM STRIP: ICD-10-PCS | Mod: S$GLB,,, | Performed by: INTERNAL MEDICINE

## 2022-10-26 PROCEDURE — 99499 RISK ADDL DX/OHS AUDIT: ICD-10-PCS | Mod: S$GLB,,, | Performed by: NURSE PRACTITIONER

## 2022-10-26 PROCEDURE — 1159F PR MEDICATION LIST DOCUMENTED IN MEDICAL RECORD: ICD-10-PCS | Mod: CPTII,S$GLB,, | Performed by: NURSE PRACTITIONER

## 2022-10-26 PROCEDURE — 3288F PR FALLS RISK ASSESSMENT DOCUMENTED: ICD-10-PCS | Mod: CPTII,S$GLB,, | Performed by: NURSE PRACTITIONER

## 2022-10-26 PROCEDURE — 3074F SYST BP LT 130 MM HG: CPT | Mod: CPTII,S$GLB,, | Performed by: NURSE PRACTITIONER

## 2022-10-26 PROCEDURE — 1126F PR PAIN SEVERITY QUANTIFIED, NO PAIN PRESENT: ICD-10-PCS | Mod: CPTII,S$GLB,, | Performed by: NURSE PRACTITIONER

## 2022-10-26 PROCEDURE — 1160F RVW MEDS BY RX/DR IN RCRD: CPT | Mod: CPTII,S$GLB,, | Performed by: NURSE PRACTITIONER

## 2022-10-26 PROCEDURE — 1160F PR REVIEW ALL MEDS BY PRESCRIBER/CLIN PHARMACIST DOCUMENTED: ICD-10-PCS | Mod: CPTII,S$GLB,, | Performed by: NURSE PRACTITIONER

## 2022-10-26 PROCEDURE — 3074F PR MOST RECENT SYSTOLIC BLOOD PRESSURE < 130 MM HG: ICD-10-PCS | Mod: CPTII,S$GLB,, | Performed by: NURSE PRACTITIONER

## 2022-10-26 PROCEDURE — 1100F PR PT FALLS ASSESS DOC 2+ FALLS/FALL W/INJURY/YR: ICD-10-PCS | Mod: CPTII,S$GLB,, | Performed by: NURSE PRACTITIONER

## 2022-10-26 PROCEDURE — 1159F MED LIST DOCD IN RCRD: CPT | Mod: CPTII,S$GLB,, | Performed by: NURSE PRACTITIONER

## 2022-10-26 PROCEDURE — 3288F FALL RISK ASSESSMENT DOCD: CPT | Mod: CPTII,S$GLB,, | Performed by: NURSE PRACTITIONER

## 2022-10-26 PROCEDURE — 1100F PTFALLS ASSESS-DOCD GE2>/YR: CPT | Mod: CPTII,S$GLB,, | Performed by: NURSE PRACTITIONER

## 2022-10-26 PROCEDURE — 99999 PR PBB SHADOW E&M-EST. PATIENT-LVL III: ICD-10-PCS | Mod: PBBFAC,,, | Performed by: NURSE PRACTITIONER

## 2022-10-26 PROCEDURE — 1126F AMNT PAIN NOTED NONE PRSNT: CPT | Mod: CPTII,S$GLB,, | Performed by: NURSE PRACTITIONER

## 2022-10-26 PROCEDURE — 93284 CARDIAC DEVICE CHECK - IN CLINIC & HOSPITAL: ICD-10-PCS | Mod: 26,,, | Performed by: INTERNAL MEDICINE

## 2022-10-26 PROCEDURE — 99999 PR PBB SHADOW E&M-EST. PATIENT-LVL III: CPT | Mod: PBBFAC,,, | Performed by: NURSE PRACTITIONER

## 2022-10-26 PROCEDURE — 3079F PR MOST RECENT DIASTOLIC BLOOD PRESSURE 80-89 MM HG: ICD-10-PCS | Mod: CPTII,S$GLB,, | Performed by: NURSE PRACTITIONER

## 2022-10-26 PROCEDURE — 99214 PR OFFICE/OUTPT VISIT, EST, LEVL IV, 30-39 MIN: ICD-10-PCS | Mod: S$GLB,,, | Performed by: NURSE PRACTITIONER

## 2022-10-26 PROCEDURE — 3044F PR MOST RECENT HEMOGLOBIN A1C LEVEL <7.0%: ICD-10-PCS | Mod: CPTII,S$GLB,, | Performed by: NURSE PRACTITIONER

## 2022-10-26 PROCEDURE — 93005 RHYTHM STRIP: ICD-10-PCS | Mod: S$GLB,,, | Performed by: INTERNAL MEDICINE

## 2022-10-26 PROCEDURE — 93284 PRGRMG EVAL IMPLANTABLE DFB: CPT | Mod: 26,,, | Performed by: INTERNAL MEDICINE

## 2022-10-26 PROCEDURE — 93010 ELECTROCARDIOGRAM REPORT: CPT | Mod: S$GLB,,, | Performed by: INTERNAL MEDICINE

## 2022-10-26 PROCEDURE — 99499 UNLISTED E&M SERVICE: CPT | Mod: S$GLB,,, | Performed by: NURSE PRACTITIONER

## 2022-10-26 PROCEDURE — 93284 PRGRMG EVAL IMPLANTABLE DFB: CPT

## 2022-10-26 PROCEDURE — 99214 OFFICE O/P EST MOD 30 MIN: CPT | Mod: S$GLB,,, | Performed by: NURSE PRACTITIONER

## 2022-10-26 PROCEDURE — 3079F DIAST BP 80-89 MM HG: CPT | Mod: CPTII,S$GLB,, | Performed by: NURSE PRACTITIONER

## 2022-10-26 PROCEDURE — 93005 ELECTROCARDIOGRAM TRACING: CPT | Mod: S$GLB,,, | Performed by: INTERNAL MEDICINE

## 2022-10-26 PROCEDURE — 3044F HG A1C LEVEL LT 7.0%: CPT | Mod: CPTII,S$GLB,, | Performed by: NURSE PRACTITIONER

## 2022-10-27 ENCOUNTER — TELEPHONE (OUTPATIENT)
Dept: ELECTROPHYSIOLOGY | Facility: CLINIC | Age: 67
End: 2022-10-27
Payer: MEDICARE

## 2022-10-27 NOTE — TELEPHONE ENCOUNTER
----- Message from Juliana Mckenzie NP sent at 10/27/2022  2:41 PM CDT -----  Hi - Dr. Kwong says this pt can have a 1 yr follow up instead of 6 months - he already has an appt with Dr. Kwong for April - can we move that to Oct? PFT appt that day too. Thanks!

## 2022-12-13 ENCOUNTER — OFFICE VISIT (OUTPATIENT)
Dept: PODIATRY | Facility: CLINIC | Age: 67
End: 2022-12-13
Payer: MEDICARE

## 2022-12-13 VITALS
WEIGHT: 302.25 LBS | BODY MASS INDEX: 35.69 KG/M2 | DIASTOLIC BLOOD PRESSURE: 80 MMHG | HEIGHT: 77 IN | HEART RATE: 73 BPM | SYSTOLIC BLOOD PRESSURE: 138 MMHG

## 2022-12-13 DIAGNOSIS — B35.1 ONYCHOMYCOSIS DUE TO DERMATOPHYTE: ICD-10-CM

## 2022-12-13 DIAGNOSIS — I73.9 PERIPHERAL VASCULAR DISEASE, UNSPECIFIED: Primary | ICD-10-CM

## 2022-12-13 DIAGNOSIS — L84 CORN OR CALLUS: ICD-10-CM

## 2022-12-13 PROCEDURE — 3008F PR BODY MASS INDEX (BMI) DOCUMENTED: ICD-10-PCS | Mod: CPTII,S$GLB,, | Performed by: PODIATRIST

## 2022-12-13 PROCEDURE — 3288F FALL RISK ASSESSMENT DOCD: CPT | Mod: CPTII,S$GLB,, | Performed by: PODIATRIST

## 2022-12-13 PROCEDURE — 11721 DEBRIDE NAIL 6 OR MORE: CPT | Mod: 59,Q9,S$GLB, | Performed by: PODIATRIST

## 2022-12-13 PROCEDURE — 3288F PR FALLS RISK ASSESSMENT DOCUMENTED: ICD-10-PCS | Mod: CPTII,S$GLB,, | Performed by: PODIATRIST

## 2022-12-13 PROCEDURE — 3079F DIAST BP 80-89 MM HG: CPT | Mod: CPTII,S$GLB,, | Performed by: PODIATRIST

## 2022-12-13 PROCEDURE — 99999 PR PBB SHADOW E&M-EST. PATIENT-LVL III: CPT | Mod: PBBFAC,,, | Performed by: PODIATRIST

## 2022-12-13 PROCEDURE — 1159F PR MEDICATION LIST DOCUMENTED IN MEDICAL RECORD: ICD-10-PCS | Mod: CPTII,S$GLB,, | Performed by: PODIATRIST

## 2022-12-13 PROCEDURE — 3008F BODY MASS INDEX DOCD: CPT | Mod: CPTII,S$GLB,, | Performed by: PODIATRIST

## 2022-12-13 PROCEDURE — 1126F AMNT PAIN NOTED NONE PRSNT: CPT | Mod: CPTII,S$GLB,, | Performed by: PODIATRIST

## 2022-12-13 PROCEDURE — 11056 PARNG/CUTG B9 HYPRKR LES 2-4: CPT | Mod: Q9,S$GLB,, | Performed by: PODIATRIST

## 2022-12-13 PROCEDURE — 3075F SYST BP GE 130 - 139MM HG: CPT | Mod: CPTII,S$GLB,, | Performed by: PODIATRIST

## 2022-12-13 PROCEDURE — 3075F PR MOST RECENT SYSTOLIC BLOOD PRESS GE 130-139MM HG: ICD-10-PCS | Mod: CPTII,S$GLB,, | Performed by: PODIATRIST

## 2022-12-13 PROCEDURE — 11721 PR DEBRIDEMENT OF NAILS, 6 OR MORE: ICD-10-PCS | Mod: 59,Q9,S$GLB, | Performed by: PODIATRIST

## 2022-12-13 PROCEDURE — 3044F PR MOST RECENT HEMOGLOBIN A1C LEVEL <7.0%: ICD-10-PCS | Mod: CPTII,S$GLB,, | Performed by: PODIATRIST

## 2022-12-13 PROCEDURE — 3079F PR MOST RECENT DIASTOLIC BLOOD PRESSURE 80-89 MM HG: ICD-10-PCS | Mod: CPTII,S$GLB,, | Performed by: PODIATRIST

## 2022-12-13 PROCEDURE — 1101F PT FALLS ASSESS-DOCD LE1/YR: CPT | Mod: CPTII,S$GLB,, | Performed by: PODIATRIST

## 2022-12-13 PROCEDURE — 11056 PR TRIM BENIGN HYPERKERATOTIC SKIN LESION,2-4: ICD-10-PCS | Mod: Q9,S$GLB,, | Performed by: PODIATRIST

## 2022-12-13 PROCEDURE — 1101F PR PT FALLS ASSESS DOC 0-1 FALLS W/OUT INJ PAST YR: ICD-10-PCS | Mod: CPTII,S$GLB,, | Performed by: PODIATRIST

## 2022-12-13 PROCEDURE — 99999 PR PBB SHADOW E&M-EST. PATIENT-LVL III: ICD-10-PCS | Mod: PBBFAC,,, | Performed by: PODIATRIST

## 2022-12-13 PROCEDURE — 3044F HG A1C LEVEL LT 7.0%: CPT | Mod: CPTII,S$GLB,, | Performed by: PODIATRIST

## 2022-12-13 PROCEDURE — 99499 UNLISTED E&M SERVICE: CPT | Mod: S$GLB,,, | Performed by: PODIATRIST

## 2022-12-13 PROCEDURE — 1126F PR PAIN SEVERITY QUANTIFIED, NO PAIN PRESENT: ICD-10-PCS | Mod: CPTII,S$GLB,, | Performed by: PODIATRIST

## 2022-12-13 PROCEDURE — 1159F MED LIST DOCD IN RCRD: CPT | Mod: CPTII,S$GLB,, | Performed by: PODIATRIST

## 2022-12-13 PROCEDURE — 99499 NO LOS: ICD-10-PCS | Mod: S$GLB,,, | Performed by: PODIATRIST

## 2022-12-15 NOTE — PROGRESS NOTES
Subjective:      Patient ID: Papito Bhakta is a 67 y.o. male.    Chief Complaint: Nail Care and Peripheral Vascular Disease      Papito is a 67 y.o. male who presents to the clinic for evaluation and treatment of high risk feet. Papito has a past medical history of RIGOBERTO (acute kidney injury) (10/7/2020), Anticoagulant long-term use, CHF (congestive heart failure), Hyperlipidemia, Hypertension, NICM (nonischemic cardiomyopathy) (6/16/2020), Obesity, AMELIA (obstructive sleep apnea) (1/7/2022), and Peripheral vascular disease, unspecified (2/1/2021). The patient's chief complaint is long, thick toenails. This patient has documented high risk feet requiring routine maintenance secondary to peripheral vascular disease. Reports elongated nails that are difficult to trim. Painful callus right plantar foot.     PCP: Brynn To MD    Date Last Seen by PCP: 2/14/22    Current shoe gear:  Affected Foot: Tennis shoes     Unaffected Foot: Tennis shoes    Last encounter in this department: Visit date not found    Hemoglobin A1C   Date Value Ref Range Status   07/18/2022 5.6 4.0 - 5.6 % Final     Comment:     ADA Screening Guidelines:  5.7-6.4%  Consistent with prediabetes  >or=6.5%  Consistent with diabetes    High levels of fetal hemoglobin interfere with the HbA1C  assay. Heterozygous hemoglobin variants (HbS, HgC, etc)do  not significantly interfere with this assay.   However, presence of multiple variants may affect accuracy.     11/19/2020 6.2 (H) 4.0 - 5.6 % Final     Comment:     ADA Screening Guidelines:  5.7-6.4%  Consistent with prediabetes  >or=6.5%  Consistent with diabetes  High levels of fetal hemoglobin interfere with the HbA1C  assay. Heterozygous hemoglobin variants (HbS, HgC, etc)do  not significantly interfere with this assay.   However, presence of multiple variants may affect accuracy.         Review of Systems   Constitutional: Negative for chills.   Cardiovascular:  Negative for chest pain and  claudication.   Respiratory:  Negative for cough.    Skin:  Positive for color change, dry skin and nail changes.   Musculoskeletal:  Positive for joint pain.   Gastrointestinal:  Negative for nausea.   Neurological:  Positive for paresthesias. Negative for numbness.   Psychiatric/Behavioral:  The patient is not nervous/anxious.          Objective:      Physical Exam  Constitutional:       Appearance: He is well-developed.   Cardiovascular:      Comments: Dorsalis pedis and posterior tibial pulses are diminished Bilaterally. Toes are cool to touch. Feet are warm proximally.There is decreased digital hair. Skin is atrophic, slightly hyperpigmented, and mildly edematous   Pulmonary:      Effort: No respiratory distress.   Musculoskeletal:         General: Tenderness present.      Comments: Adequate joint range of motion without pain, limitation, nor crepitation Bilateral feet and ankle joints. Muscle strength is 5/5 in all groups bilaterally.    Feet:      Right foot:      Skin integrity: Callus and dry skin present. No ulcer or skin breakdown.      Left foot:      Skin integrity: Dry skin present. No ulcer.   Skin:     General: Skin is dry.      Findings: No erythema.      Comments: Nails x10 are elongated by 2-6mm's, thickened by 3-5 mm's, dystrophic, and are darkened in coloration . Xerosis Bilaterally. No open lesions noted     Focal hyperkeratotic lesion consisting entirely of hyperkeratotic tissue without open skin, drainage, pus, fluctuance, malodor, or signs of infection: right plantar foot right plantar 2nd toe      Neurological:      Mental Status: He is alert.      Comments: [place]-Sean 5.07 monofilamant testing is diminished Wilman feet. Sharp/dull sensation diminished Bilaterally. Light touch absent Bilaterally.              Assessment:       Encounter Diagnoses   Name Primary?    Peripheral vascular disease, unspecified Yes    Onychomycosis due to dermatophyte     Corn or callus            Plan:        Papito was seen today for nail care and peripheral vascular disease.    Diagnoses and all orders for this visit:    Peripheral vascular disease, unspecified    Onychomycosis due to dermatophyte    Corn or callus        I counseled the patient on his conditions, their implications and medical management.      - Shoe inspection. Diabetic Foot Education. Patient reminded of the importance of good nutrition and blood sugar control to help prevent podiatric complications of diabetes. Patient instructed on proper foot hygeine. We discussed wearing proper shoe gear, daily foot inspections, never walking without protective shoe gear, never putting sharp instruments to feet, routine podiatric nail visits every 2-3 months.   - With patient's permission, nails were aggressively reduced and debrided x 10 to their soft tissue attachment mechanically and with electric , removing all offending nail and debris. Patient relates relief following the procedure. He will continue to monitor the areas daily, inspect his feet, wear protective shoe gear when ambulatory, moisturizer to maintain skin integrity and follow in this office in approximately 2-3 months, sooner p.r.n.     - After cleansing the area w/ alcohol prep pad the above mentioned hyperkeratosis was trimmed utilizing No 3 curette right plantar foot.

## 2022-12-16 ENCOUNTER — CLINICAL SUPPORT (OUTPATIENT)
Dept: CARDIOLOGY | Facility: HOSPITAL | Age: 67
End: 2022-12-16
Payer: MEDICARE

## 2022-12-16 DIAGNOSIS — I42.9 CARDIOMYOPATHY, UNSPECIFIED: ICD-10-CM

## 2022-12-16 DIAGNOSIS — Z95.810 PRESENCE OF AUTOMATIC (IMPLANTABLE) CARDIAC DEFIBRILLATOR: ICD-10-CM

## 2022-12-16 PROCEDURE — 93295 DEV INTERROG REMOTE 1/2/MLT: CPT | Mod: ,,, | Performed by: INTERNAL MEDICINE

## 2022-12-16 PROCEDURE — 93295 CARDIAC DEVICE CHECK - REMOTE: ICD-10-PCS | Mod: ,,, | Performed by: INTERNAL MEDICINE

## 2022-12-16 PROCEDURE — 93296 REM INTERROG EVL PM/IDS: CPT | Performed by: INTERNAL MEDICINE

## 2023-01-01 NOTE — Clinical Note
Generator Pocket opened at the left  upper chest  with blunt dissection, electrocautery, plasma blade and sharp dissection. N/A

## 2023-02-07 DIAGNOSIS — Z00.00 ENCOUNTER FOR MEDICARE ANNUAL WELLNESS EXAM: ICD-10-CM

## 2023-02-09 DIAGNOSIS — Z00.00 ENCOUNTER FOR MEDICARE ANNUAL WELLNESS EXAM: ICD-10-CM

## 2023-03-03 ENCOUNTER — OFFICE VISIT (OUTPATIENT)
Dept: FAMILY MEDICINE | Facility: CLINIC | Age: 68
End: 2023-03-03
Payer: MEDICARE

## 2023-03-03 VITALS
DIASTOLIC BLOOD PRESSURE: 76 MMHG | OXYGEN SATURATION: 97 % | SYSTOLIC BLOOD PRESSURE: 126 MMHG | HEART RATE: 70 BPM | WEIGHT: 305.75 LBS | HEIGHT: 77 IN | RESPIRATION RATE: 16 BRPM | TEMPERATURE: 98 F | BODY MASS INDEX: 36.1 KG/M2

## 2023-03-03 DIAGNOSIS — D69.6 THROMBOCYTOPENIA, UNSPECIFIED: ICD-10-CM

## 2023-03-03 DIAGNOSIS — I73.9 PERIPHERAL VASCULAR DISEASE, UNSPECIFIED: ICD-10-CM

## 2023-03-03 DIAGNOSIS — I50.42 CHRONIC COMBINED SYSTOLIC AND DIASTOLIC CONGESTIVE HEART FAILURE: Primary | ICD-10-CM

## 2023-03-03 DIAGNOSIS — I10 ESSENTIAL HYPERTENSION: Chronic | ICD-10-CM

## 2023-03-03 DIAGNOSIS — E66.01 SEVERE OBESITY (BMI 35.0-39.9) WITH COMORBIDITY: ICD-10-CM

## 2023-03-03 DIAGNOSIS — I47.20 VENTRICULAR TACHYCARDIA: ICD-10-CM

## 2023-03-03 PROCEDURE — 1126F PR PAIN SEVERITY QUANTIFIED, NO PAIN PRESENT: ICD-10-PCS | Mod: CPTII,S$GLB,, | Performed by: FAMILY MEDICINE

## 2023-03-03 PROCEDURE — 99999 PR PBB SHADOW E&M-EST. PATIENT-LVL IV: ICD-10-PCS | Mod: PBBFAC,,, | Performed by: FAMILY MEDICINE

## 2023-03-03 PROCEDURE — 1101F PT FALLS ASSESS-DOCD LE1/YR: CPT | Mod: CPTII,S$GLB,, | Performed by: FAMILY MEDICINE

## 2023-03-03 PROCEDURE — 1160F RVW MEDS BY RX/DR IN RCRD: CPT | Mod: CPTII,S$GLB,, | Performed by: FAMILY MEDICINE

## 2023-03-03 PROCEDURE — 3074F PR MOST RECENT SYSTOLIC BLOOD PRESSURE < 130 MM HG: ICD-10-PCS | Mod: CPTII,S$GLB,, | Performed by: FAMILY MEDICINE

## 2023-03-03 PROCEDURE — 3008F BODY MASS INDEX DOCD: CPT | Mod: CPTII,S$GLB,, | Performed by: FAMILY MEDICINE

## 2023-03-03 PROCEDURE — 99214 OFFICE O/P EST MOD 30 MIN: CPT | Mod: S$GLB,,, | Performed by: FAMILY MEDICINE

## 2023-03-03 PROCEDURE — 3078F DIAST BP <80 MM HG: CPT | Mod: CPTII,S$GLB,, | Performed by: FAMILY MEDICINE

## 2023-03-03 PROCEDURE — 3288F FALL RISK ASSESSMENT DOCD: CPT | Mod: CPTII,S$GLB,, | Performed by: FAMILY MEDICINE

## 2023-03-03 PROCEDURE — 3288F PR FALLS RISK ASSESSMENT DOCUMENTED: ICD-10-PCS | Mod: CPTII,S$GLB,, | Performed by: FAMILY MEDICINE

## 2023-03-03 PROCEDURE — 1101F PR PT FALLS ASSESS DOC 0-1 FALLS W/OUT INJ PAST YR: ICD-10-PCS | Mod: CPTII,S$GLB,, | Performed by: FAMILY MEDICINE

## 2023-03-03 PROCEDURE — 1159F MED LIST DOCD IN RCRD: CPT | Mod: CPTII,S$GLB,, | Performed by: FAMILY MEDICINE

## 2023-03-03 PROCEDURE — 99214 PR OFFICE/OUTPT VISIT, EST, LEVL IV, 30-39 MIN: ICD-10-PCS | Mod: S$GLB,,, | Performed by: FAMILY MEDICINE

## 2023-03-03 PROCEDURE — 1159F PR MEDICATION LIST DOCUMENTED IN MEDICAL RECORD: ICD-10-PCS | Mod: CPTII,S$GLB,, | Performed by: FAMILY MEDICINE

## 2023-03-03 PROCEDURE — 1126F AMNT PAIN NOTED NONE PRSNT: CPT | Mod: CPTII,S$GLB,, | Performed by: FAMILY MEDICINE

## 2023-03-03 PROCEDURE — 1160F PR REVIEW ALL MEDS BY PRESCRIBER/CLIN PHARMACIST DOCUMENTED: ICD-10-PCS | Mod: CPTII,S$GLB,, | Performed by: FAMILY MEDICINE

## 2023-03-03 PROCEDURE — 3008F PR BODY MASS INDEX (BMI) DOCUMENTED: ICD-10-PCS | Mod: CPTII,S$GLB,, | Performed by: FAMILY MEDICINE

## 2023-03-03 PROCEDURE — 3074F SYST BP LT 130 MM HG: CPT | Mod: CPTII,S$GLB,, | Performed by: FAMILY MEDICINE

## 2023-03-03 PROCEDURE — 99999 PR PBB SHADOW E&M-EST. PATIENT-LVL IV: CPT | Mod: PBBFAC,,, | Performed by: FAMILY MEDICINE

## 2023-03-03 PROCEDURE — 3078F PR MOST RECENT DIASTOLIC BLOOD PRESSURE < 80 MM HG: ICD-10-PCS | Mod: CPTII,S$GLB,, | Performed by: FAMILY MEDICINE

## 2023-03-03 RX ORDER — SPIRONOLACTONE 25 MG/1
TABLET ORAL
Qty: 45 TABLET | Refills: 5 | Status: ON HOLD | OUTPATIENT
Start: 2023-03-03 | End: 2024-01-10

## 2023-03-03 RX ORDER — FUROSEMIDE 80 MG/1
80 TABLET ORAL DAILY
Qty: 90 TABLET | Refills: 3 | Status: SHIPPED | OUTPATIENT
Start: 2023-03-03 | End: 2023-05-17

## 2023-03-03 NOTE — PROGRESS NOTES
Chief Complaint   Patient presents with    Hypertension    Congestive Heart Failure       HPI  Papito Bhakta is a 67 y.o. male with multiple medical diagnoses as listed in the medical history and problem list that presents for follow-up for CHF    CHF-doubles up on his fluid pills, on 80mg of lasix and a half tablet every other day; at times he has taken two pills; he does not weigh himself more than once weekly, he has gained almost 20lbs since his last visit and does admit to eating saltier foods throughout the holidays, he is not having shortness of breath      ALLERGIES AND MEDICATIONS: updated and reviewed.  Review of patient's allergies indicates:  No Known Allergies  Medication List with Changes/Refills   Current Medications    AMIODARONE (PACERONE) 200 MG TAB    Take 1 tablet (200 mg total) by mouth once daily.    ASPIRIN (ECOTRIN) 325 MG EC TABLET    Take 1 tablet (325 mg total) by mouth once daily.    GABAPENTIN (NEURONTIN) 100 MG CAPSULE    Take 1 capsule (100 mg total) by mouth 3 (three) times daily. Take 100 mg by mouth 3 (three) times daily.    METOPROLOL SUCCINATE (TOPROL-XL) 50 MG 24 HR TABLET    TAKE 1 TABLET EVERY DAY    POTASSIUM CHLORIDE (K-TAB) 20 MEQ    TAKE 1 TABLET EVERY DAY    PRAVASTATIN (PRAVACHOL) 80 MG TABLET    TAKE 1 TABLET EVERY DAY    SACUBITRIL/VALSARTAN (ENTRESTO ORAL)    Take 1 tablet by mouth once daily. Mg unknown, did not bring.   Changed and/or Refilled Medications    Modified Medication Previous Medication    FUROSEMIDE (LASIX) 80 MG TABLET furosemide (LASIX) 80 MG tablet       Take 1 tablet (80 mg total) by mouth once daily.    TAKE 1 TABLET EVERY DAY    SPIRONOLACTONE (ALDACTONE) 25 MG TABLET spironolactone (ALDACTONE) 25 MG tablet       TAKE 1/2 TABLET (12.5 MG TOTAL) ONCE DAILY. HOLD IF SYSTOLIC BLOOD PRESSURE IS LESS THAN 110    TAKE 1/2 TABLET (12.5 MG TOTAL) ONCE DAILY. HOLD IF SYSTOLIC BLOOD PRESSURE IS LESS THAN 110       ROS  Review of Systems   Constitutional:   "Negative for chills, fatigue, fever and unexpected weight change.   HENT:  Negative for ear pain, postnasal drip, rhinorrhea, sinus pressure and sore throat.    Eyes:  Negative for photophobia and visual disturbance.   Respiratory:  Negative for apnea, cough, chest tightness, shortness of breath and wheezing.    Cardiovascular:  Negative for chest pain and palpitations.   Gastrointestinal:  Negative for abdominal pain, blood in stool, constipation, diarrhea, nausea and vomiting.   Genitourinary:  Negative for difficulty urinating.   Musculoskeletal:  Negative for arthralgias and joint swelling.   Skin:  Negative for rash.   Neurological:  Negative for facial asymmetry, speech difficulty, weakness, numbness and headaches.   Psychiatric/Behavioral:  Negative for dysphoric mood.      Physical Exam  Vitals:    03/03/23 0943   BP: 126/76   BP Location: Left arm   Patient Position: Sitting   BP Method: Large (Manual)   Pulse: 70   Resp: 16   Temp: 98 °F (36.7 °C)   TempSrc: Oral   SpO2: 97%   Weight: (!) 138.7 kg (305 lb 12.5 oz)   Height: 6' 5" (1.956 m)    Body mass index is 36.26 kg/m².  Weight: (!) 138.7 kg (305 lb 12.5 oz)   Height: 6' 5" (195.6 cm)     Physical Exam  Vitals and nursing note reviewed.   Constitutional:       Appearance: He is well-developed.   HENT:      Head: Normocephalic and atraumatic.      Mouth/Throat:      Pharynx: No oropharyngeal exudate.   Cardiovascular:      Rate and Rhythm: Normal rate and regular rhythm.      Heart sounds: Normal heart sounds. No murmur heard.    No friction rub. No gallop.   Pulmonary:      Effort: Pulmonary effort is normal. No respiratory distress.      Breath sounds: Rales present. No wheezing.   Chest:      Chest wall: No tenderness.   Lymphadenopathy:      Cervical: No cervical adenopathy.   Skin:     General: Skin is warm and dry.   Neurological:      Mental Status: He is alert. Mental status is at baseline.   Psychiatric:         Behavior: Behavior normal. "       Health Maintenance         Date Due Completion Date    COVID-19 Vaccine (4 - Booster for Moderna series) 06/06/2022 4/11/2022    LDCT Lung Screen 01/03/2023 1/3/2022    Pneumococcal Vaccines (Age 65+) (3) 06/06/2023 7/22/2020    Hemoglobin A1c (Prediabetes) 07/18/2023 7/18/2022    Colorectal Cancer Screening 10/10/2026 10/10/2016 (Done)    Override on 10/10/2016: Done    Lipid Panel 07/18/2027 7/18/2022    TETANUS VACCINE 06/06/2028 6/6/2018            Health maintenance reviewed and addressed as ordered      ASSESSMENT/PLAN       1. Chronic combined systolic and diastolic congestive heart failure  Recommend daily weights  Ok to take two pills if weight has increased by 5-7 lbs  Avoid salty foods as much as possible  Recommend he take two lasix pills for the next three days   - furosemide (LASIX) 80 MG tablet; Take 1 tablet (80 mg total) by mouth once daily.  Dispense: 90 tablet; Refill: 3    2. Essential hypertension  controlled  - spironolactone (ALDACTONE) 25 MG tablet; TAKE 1/2 TABLET (12.5 MG TOTAL) ONCE DAILY. HOLD IF SYSTOLIC BLOOD PRESSURE IS LESS THAN 110  Dispense: 45 tablet; Refill: 5    3. Severe obesity (BMI 35.0-39.9) with comorbidity  Na restriction and exercise as tolerated    4. Thrombocytopenia, unspecified  Monitor with routine labs    5. Ventricular tachycardia  stable    6. Peripheral vascular disease, unspecified  stable        Brynn To MD  03/03/2023 10:06 AM        Follow up in about 3 months (around 6/3/2023) for management of chronic conditions.    No orders of the defined types were placed in this encounter.

## 2023-03-06 ENCOUNTER — PES CALL (OUTPATIENT)
Dept: ADMINISTRATIVE | Facility: CLINIC | Age: 68
End: 2023-03-06
Payer: MEDICARE

## 2023-03-16 ENCOUNTER — CLINICAL SUPPORT (OUTPATIENT)
Dept: CARDIOLOGY | Facility: HOSPITAL | Age: 68
End: 2023-03-16
Payer: MEDICARE

## 2023-03-16 DIAGNOSIS — Z95.810 PRESENCE OF AUTOMATIC (IMPLANTABLE) CARDIAC DEFIBRILLATOR: ICD-10-CM

## 2023-03-16 PROCEDURE — 93296 REM INTERROG EVL PM/IDS: CPT | Performed by: INTERNAL MEDICINE

## 2023-03-20 ENCOUNTER — PES CALL (OUTPATIENT)
Dept: ADMINISTRATIVE | Facility: CLINIC | Age: 68
End: 2023-03-20
Payer: MEDICARE

## 2023-03-21 ENCOUNTER — OFFICE VISIT (OUTPATIENT)
Dept: PODIATRY | Facility: CLINIC | Age: 68
End: 2023-03-21
Payer: MEDICARE

## 2023-03-21 VITALS — BODY MASS INDEX: 36.1 KG/M2 | WEIGHT: 305.75 LBS | HEIGHT: 77 IN

## 2023-03-21 DIAGNOSIS — I73.9 PERIPHERAL VASCULAR DISEASE, UNSPECIFIED: Primary | ICD-10-CM

## 2023-03-21 DIAGNOSIS — B35.1 ONYCHOMYCOSIS DUE TO DERMATOPHYTE: ICD-10-CM

## 2023-03-21 DIAGNOSIS — L84 CORN OR CALLUS: ICD-10-CM

## 2023-03-21 PROCEDURE — 99999 PR PBB SHADOW E&M-EST. PATIENT-LVL III: ICD-10-PCS | Mod: PBBFAC,,, | Performed by: PODIATRIST

## 2023-03-21 PROCEDURE — 3288F FALL RISK ASSESSMENT DOCD: CPT | Mod: CPTII,S$GLB,, | Performed by: PODIATRIST

## 2023-03-21 PROCEDURE — 3288F PR FALLS RISK ASSESSMENT DOCUMENTED: ICD-10-PCS | Mod: CPTII,S$GLB,, | Performed by: PODIATRIST

## 2023-03-21 PROCEDURE — 11721 PR DEBRIDEMENT OF NAILS, 6 OR MORE: ICD-10-PCS | Mod: 59,Q9,S$GLB, | Performed by: PODIATRIST

## 2023-03-21 PROCEDURE — 1125F AMNT PAIN NOTED PAIN PRSNT: CPT | Mod: CPTII,S$GLB,, | Performed by: PODIATRIST

## 2023-03-21 PROCEDURE — 1101F PR PT FALLS ASSESS DOC 0-1 FALLS W/OUT INJ PAST YR: ICD-10-PCS | Mod: CPTII,S$GLB,, | Performed by: PODIATRIST

## 2023-03-21 PROCEDURE — 3008F PR BODY MASS INDEX (BMI) DOCUMENTED: ICD-10-PCS | Mod: CPTII,S$GLB,, | Performed by: PODIATRIST

## 2023-03-21 PROCEDURE — 1101F PT FALLS ASSESS-DOCD LE1/YR: CPT | Mod: CPTII,S$GLB,, | Performed by: PODIATRIST

## 2023-03-21 PROCEDURE — 3008F BODY MASS INDEX DOCD: CPT | Mod: CPTII,S$GLB,, | Performed by: PODIATRIST

## 2023-03-21 PROCEDURE — 99499 NO LOS: ICD-10-PCS | Mod: S$GLB,,, | Performed by: PODIATRIST

## 2023-03-21 PROCEDURE — 11056 PARNG/CUTG B9 HYPRKR LES 2-4: CPT | Mod: Q9,S$GLB,, | Performed by: PODIATRIST

## 2023-03-21 PROCEDURE — 11721 DEBRIDE NAIL 6 OR MORE: CPT | Mod: 59,Q9,S$GLB, | Performed by: PODIATRIST

## 2023-03-21 PROCEDURE — 99999 PR PBB SHADOW E&M-EST. PATIENT-LVL III: CPT | Mod: PBBFAC,,, | Performed by: PODIATRIST

## 2023-03-21 PROCEDURE — 1125F PR PAIN SEVERITY QUANTIFIED, PAIN PRESENT: ICD-10-PCS | Mod: CPTII,S$GLB,, | Performed by: PODIATRIST

## 2023-03-21 PROCEDURE — 99499 UNLISTED E&M SERVICE: CPT | Mod: S$GLB,,, | Performed by: PODIATRIST

## 2023-03-21 PROCEDURE — 11056 PR TRIM BENIGN HYPERKERATOTIC SKIN LESION,2-4: ICD-10-PCS | Mod: Q9,S$GLB,, | Performed by: PODIATRIST

## 2023-03-21 PROCEDURE — 1159F PR MEDICATION LIST DOCUMENTED IN MEDICAL RECORD: ICD-10-PCS | Mod: CPTII,S$GLB,, | Performed by: PODIATRIST

## 2023-03-21 PROCEDURE — 1159F MED LIST DOCD IN RCRD: CPT | Mod: CPTII,S$GLB,, | Performed by: PODIATRIST

## 2023-03-21 NOTE — PROGRESS NOTES
Subjective:      Patient ID: Papito Bhakta is a 67 y.o. male.    Chief Complaint: Diabetes Mellitus (3/3/23 Dr To PCP), Nail Care, and Callouses      Papito is a 67 y.o. male who presents to the clinic for evaluation and treatment of high risk feet. Papito has a past medical history of RIGOBERTO (acute kidney injury) (10/7/2020), Anticoagulant long-term use, CHF (congestive heart failure), Hyperlipidemia, Hypertension, NICM (nonischemic cardiomyopathy) (6/16/2020), Obesity, AMELIA (obstructive sleep apnea) (1/7/2022), and Peripheral vascular disease, unspecified (2/1/2021). The patient's chief complaint is long, thick toenails. This patient has documented high risk feet requiring routine maintenance secondary to peripheral vascular disease. Reports elongated nails that are difficult to trim. Painful callus right plantar foot.     PCP: Brynn To MD    Date Last Seen by PCP: 2/14/22    Current shoe gear:  Affected Foot: Tennis shoes     Unaffected Foot: Tennis shoes    Last encounter in this department: Visit date not found    Hemoglobin A1C   Date Value Ref Range Status   07/18/2022 5.6 4.0 - 5.6 % Final     Comment:     ADA Screening Guidelines:  5.7-6.4%  Consistent with prediabetes  >or=6.5%  Consistent with diabetes    High levels of fetal hemoglobin interfere with the HbA1C  assay. Heterozygous hemoglobin variants (HbS, HgC, etc)do  not significantly interfere with this assay.   However, presence of multiple variants may affect accuracy.     11/19/2020 6.2 (H) 4.0 - 5.6 % Final     Comment:     ADA Screening Guidelines:  5.7-6.4%  Consistent with prediabetes  >or=6.5%  Consistent with diabetes  High levels of fetal hemoglobin interfere with the HbA1C  assay. Heterozygous hemoglobin variants (HbS, HgC, etc)do  not significantly interfere with this assay.   However, presence of multiple variants may affect accuracy.         Review of Systems   Constitutional: Negative for chills.   Cardiovascular:  Negative for  chest pain and claudication.   Respiratory:  Negative for cough.    Skin:  Positive for color change, dry skin and nail changes.   Musculoskeletal:  Positive for joint pain.   Gastrointestinal:  Negative for nausea.   Neurological:  Positive for paresthesias. Negative for numbness.   Psychiatric/Behavioral:  The patient is not nervous/anxious.          Objective:      Physical Exam  Constitutional:       Appearance: He is well-developed.   Cardiovascular:      Comments: Dorsalis pedis and posterior tibial pulses are diminished Bilaterally. Toes are cool to touch. Feet are warm proximally.There is decreased digital hair. Skin is atrophic, slightly hyperpigmented, and mildly edematous   Pulmonary:      Effort: No respiratory distress.   Musculoskeletal:         General: Tenderness present.      Comments: Adequate joint range of motion without pain, limitation, nor crepitation Bilateral feet and ankle joints. Muscle strength is 5/5 in all groups bilaterally.    Feet:      Right foot:      Skin integrity: Callus and dry skin present. No ulcer or skin breakdown.      Left foot:      Skin integrity: Dry skin present. No ulcer.   Skin:     General: Skin is dry.      Findings: No erythema.      Comments: Nails x10 are elongated by 2-6mm's, thickened by 3-5 mm's, dystrophic, and are darkened in coloration . Xerosis Bilaterally. No open lesions noted     Focal hyperkeratotic lesion consisting entirely of hyperkeratotic tissue without open skin, drainage, pus, fluctuance, malodor, or signs of infection: right plantar foot right plantar 2nd toe      Neurological:      Mental Status: He is alert.      Comments: [place] 5.07 monofilamant testing is diminished Wilman feet. Sharp/dull sensation diminished Bilaterally. Light touch absent Bilaterally.              Assessment:       No diagnosis found.          Plan:       There are no diagnoses linked to this encounter.      I counseled the patient on his conditions, their  implications and medical management.      - Shoe inspection. Diabetic Foot Education. Patient reminded of the importance of good nutrition and blood sugar control to help prevent podiatric complications of diabetes. Patient instructed on proper foot hygeine. We discussed wearing proper shoe gear, daily foot inspections, never walking without protective shoe gear, never putting sharp instruments to feet, routine podiatric nail visits every 2-3 months.   - With patient's permission, nails were aggressively reduced and debrided x 10 to their soft tissue attachment mechanically and with electric , removing all offending nail and debris. Patient relates relief following the procedure. He will continue to monitor the areas daily, inspect his feet, wear protective shoe gear when ambulatory, moisturizer to maintain skin integrity and follow in this office in approximately 2-3 months, sooner p.r.n.     - After cleansing the area w/ alcohol prep pad the above mentioned hyperkeratosis was trimmed utilizing No 3 curette right plantar foot.

## 2023-05-12 ENCOUNTER — HOSPITAL ENCOUNTER (EMERGENCY)
Facility: HOSPITAL | Age: 68
Discharge: HOME | End: 2023-05-12
Attending: EMERGENCY MEDICINE
Payer: MEDICARE

## 2023-05-12 VITALS
DIASTOLIC BLOOD PRESSURE: 64 MMHG | HEIGHT: 77 IN | TEMPERATURE: 98 F | WEIGHT: 300 LBS | SYSTOLIC BLOOD PRESSURE: 118 MMHG | BODY MASS INDEX: 35.42 KG/M2 | OXYGEN SATURATION: 97 % | RESPIRATION RATE: 13 BRPM | HEART RATE: 60 BPM

## 2023-05-12 DIAGNOSIS — R10.9 LEFT FLANK PAIN: Primary | ICD-10-CM

## 2023-05-12 DIAGNOSIS — M53.9 MULTILEVEL DEGENERATIVE DISC DISEASE: ICD-10-CM

## 2023-05-12 LAB
ALBUMIN SERPL BCP-MCNC: 3.6 G/DL (ref 3.5–5.2)
ALP SERPL-CCNC: 61 U/L (ref 55–135)
ALT SERPL W/O P-5'-P-CCNC: 9 U/L (ref 10–44)
ANION GAP SERPL CALC-SCNC: 10 MMOL/L (ref 8–16)
AST SERPL-CCNC: 18 U/L (ref 10–40)
BASOPHILS # BLD AUTO: 0.03 K/UL (ref 0–0.2)
BASOPHILS NFR BLD: 0.6 % (ref 0–1.9)
BILIRUB SERPL-MCNC: 0.9 MG/DL (ref 0.1–1)
BILIRUB UR QL STRIP: NEGATIVE
BUN SERPL-MCNC: 21 MG/DL (ref 8–23)
CALCIUM SERPL-MCNC: 9 MG/DL (ref 8.7–10.5)
CHLORIDE SERPL-SCNC: 105 MMOL/L (ref 95–110)
CLARITY UR: CLEAR
CO2 SERPL-SCNC: 24 MMOL/L (ref 23–29)
COLOR UR: YELLOW
CREAT SERPL-MCNC: 1.4 MG/DL (ref 0.5–1.4)
DIFFERENTIAL METHOD: ABNORMAL
EOSINOPHIL # BLD AUTO: 0.1 K/UL (ref 0–0.5)
EOSINOPHIL NFR BLD: 2.6 % (ref 0–8)
ERYTHROCYTE [DISTWIDTH] IN BLOOD BY AUTOMATED COUNT: 14.5 % (ref 11.5–14.5)
EST. GFR  (NO RACE VARIABLE): 55 ML/MIN/1.73 M^2
GLUCOSE SERPL-MCNC: 101 MG/DL (ref 70–110)
GLUCOSE UR QL STRIP: NEGATIVE
HCT VFR BLD AUTO: 39.8 % (ref 40–54)
HGB BLD-MCNC: 12.8 G/DL (ref 14–18)
HGB UR QL STRIP: NEGATIVE
IMM GRANULOCYTES # BLD AUTO: 0.01 K/UL (ref 0–0.04)
IMM GRANULOCYTES NFR BLD AUTO: 0.2 % (ref 0–0.5)
KETONES UR QL STRIP: NEGATIVE
LEUKOCYTE ESTERASE UR QL STRIP: NEGATIVE
LIPASE SERPL-CCNC: 28 U/L (ref 4–60)
LYMPHOCYTES # BLD AUTO: 1.5 K/UL (ref 1–4.8)
LYMPHOCYTES NFR BLD: 32.3 % (ref 18–48)
MCH RBC QN AUTO: 25 PG (ref 27–31)
MCHC RBC AUTO-ENTMCNC: 32.2 G/DL (ref 32–36)
MCV RBC AUTO: 78 FL (ref 82–98)
MONOCYTES # BLD AUTO: 0.6 K/UL (ref 0.3–1)
MONOCYTES NFR BLD: 13.2 % (ref 4–15)
NEUTROPHILS # BLD AUTO: 2.4 K/UL (ref 1.8–7.7)
NEUTROPHILS NFR BLD: 51.1 % (ref 38–73)
NITRITE UR QL STRIP: NEGATIVE
NRBC BLD-RTO: 0 /100 WBC
PH UR STRIP: 5 [PH] (ref 5–8)
PLATELET # BLD AUTO: 145 K/UL (ref 150–450)
PMV BLD AUTO: 11.4 FL (ref 9.2–12.9)
POTASSIUM SERPL-SCNC: 3.9 MMOL/L (ref 3.5–5.1)
PROT SERPL-MCNC: 7.4 G/DL (ref 6–8.4)
PROT UR QL STRIP: NEGATIVE
RBC # BLD AUTO: 5.11 M/UL (ref 4.6–6.2)
SODIUM SERPL-SCNC: 139 MMOL/L (ref 136–145)
SP GR UR STRIP: 1.01 (ref 1–1.03)
URN SPEC COLLECT METH UR: NORMAL
UROBILINOGEN UR STRIP-ACNC: NEGATIVE EU/DL
WBC # BLD AUTO: 4.68 K/UL (ref 3.9–12.7)

## 2023-05-12 PROCEDURE — 85025 COMPLETE CBC W/AUTO DIFF WBC: CPT | Performed by: EMERGENCY MEDICINE

## 2023-05-12 PROCEDURE — 83690 ASSAY OF LIPASE: CPT | Performed by: EMERGENCY MEDICINE

## 2023-05-12 PROCEDURE — 99284 EMERGENCY DEPT VISIT MOD MDM: CPT | Mod: 25

## 2023-05-12 PROCEDURE — 81003 URINALYSIS AUTO W/O SCOPE: CPT | Performed by: PHYSICIAN ASSISTANT

## 2023-05-12 PROCEDURE — 80053 COMPREHEN METABOLIC PANEL: CPT | Performed by: EMERGENCY MEDICINE

## 2023-05-12 RX ORDER — ACETAMINOPHEN 500 MG
1000 TABLET ORAL EVERY 8 HOURS PRN
Qty: 30 TABLET | Refills: 0 | Status: ON HOLD | OUTPATIENT
Start: 2023-05-12 | End: 2024-01-02 | Stop reason: CLARIF

## 2023-05-12 RX ORDER — METHOCARBAMOL 500 MG/1
1000 TABLET, FILM COATED ORAL 3 TIMES DAILY PRN
Qty: 30 TABLET | Refills: 0 | Status: SHIPPED | OUTPATIENT
Start: 2023-05-12 | End: 2023-12-06

## 2023-05-12 RX ORDER — MELOXICAM 15 MG/1
15 TABLET ORAL DAILY PRN
Qty: 30 TABLET | Refills: 0 | Status: ON HOLD | OUTPATIENT
Start: 2023-05-12 | End: 2023-11-15 | Stop reason: HOSPADM

## 2023-05-12 NOTE — ED PROVIDER NOTES
"Encounter Date: 5/12/2023    SCRIBE #1 NOTE: I, Henok Odom, am scribing for, and in the presence of,  Rut Cobb MD.     History     Chief Complaint   Patient presents with    Abdominal Pain     Pt to the ED with complaints of left sided abdominal pain that radiates to his right back described as "cramps" x3 days. Pt reports he was feeling a little constipated, so he took a laxative this morning and had a bowel movement this morning. Pt denies bloody or painful stools. Pt denies N/V, chest pain, shortness of breath.      This is a 68 y.o. male, with a PMHx of HTN, HLD, CHF, NICM, who presents to the Emergency Department complaining of intermittent LUQ "cramping" abdominal pain for 3 days. He has attempted treatment with laxatives with no relief. No exacerbating or alleviating factors. Patient notes chronic dyspnea on exertion and RLE swelling. Patient denies chest pain, new/worsening shortness of breath, new/worsening leg swelling, nausea, vomiting, diarrhea, blood in stool, dysuria, increased/decreased urinary frequency, fever, or any other associated symptoms. He endorses compliance with all medications, including ASA, furosemide, gabapentin, metoprolol succinate, pravastatin, Entresto.     The history is provided by the patient. No  was used.   Review of patient's allergies indicates:  No Known Allergies  Past Medical History:   Diagnosis Date    RIGOBERTO (acute kidney injury) 10/7/2020    Anticoagulant long-term use     Aspirin    CHF (congestive heart failure)     Hyperlipidemia     Hypertension     NICM (nonischemic cardiomyopathy) 6/16/2020    Obesity     AMELIA (obstructive sleep apnea) 1/7/2022    Peripheral vascular disease, unspecified 2/1/2021     Past Surgical History:   Procedure Laterality Date    INSERTION OF BIVENTRICULAR IMPLANTABLE CARDIOVERTER-DEFIBRILLATOR (ICD) N/A 2/13/2019    Procedure: INSERTION, ICD, BIVENTRICULAR;  Surgeon: Shailesh Eng MD;  Location: [facility] CATH LAB; "  Service: Cardiology;  Laterality: N/A;  RN PRE OP 2-6-19  1ST CASE PER  RADHA. NOTIFIED RADHA THAT ANESTHESIA IS NOT PITO FOR 1ST CASE START-LO    INSERTION OF BIVENTRICULAR IMPLANTABLE CARDIOVERTER-DEFIBRILLATOR (ICD) N/A 2020    Procedure: INSERTION, ICD, BIVENTRICULAR;  Surgeon: Jim Kwong MD;  Location: [facility] EP LAB;  Service: Cardiology;  Laterality: N/A;  NICM, CRT-D, SJM,, MAC, DM, 3 Prep*Wearing LifeVest*    oral extraction  2018    TESTICLE SURGERY       Family History   Problem Relation Age of Onset    Epilepsy Mother      Social History     Tobacco Use    Smoking status: Former     Packs/day: 0.50     Years: 40.00     Pack years: 20.00     Types: Cigarettes, Cigars     Quit date:      Years since quittin.3    Smokeless tobacco: Never   Substance Use Topics    Alcohol use: Yes     Comment: once a month    Drug use: No     Review of Systems   Constitutional:  Negative for fever.   HENT:  Negative for facial swelling and sore throat.    Eyes:  Negative for pain and discharge.   Respiratory:  Negative for choking and shortness of breath.    Cardiovascular:  Negative for chest pain and leg swelling.   Gastrointestinal:  Positive for abdominal pain. Negative for blood in stool, diarrhea, nausea and vomiting.   Genitourinary:  Negative for difficulty urinating, dysuria and frequency.   Musculoskeletal:  Negative for back pain.   Skin:  Negative for rash.   Neurological:  Negative for weakness and numbness.     Physical Exam     Initial Vitals [23 1507]   BP Pulse Resp Temp SpO2   123/78 60 18 97.7 °F (36.5 °C) 97 %      MAP       --         Physical Exam    Nursing note and vitals reviewed.  Constitutional: He is not diaphoretic. No distress.   HENT:   Head: Normocephalic and atraumatic.   Protecting airway   Eyes: Conjunctivae and EOM are normal. No scleral icterus.   Neck: Neck supple. No tracheal deviation present.   Normal range of motion.  Cardiovascular:  Normal rate, regular  rhythm and intact distal pulses.           Pulmonary/Chest: No stridor. No respiratory distress.   Speaking in full sentences   Abdominal: Abdomen is soft. He exhibits no distension. There is no abdominal tenderness.   There is left CVA tenderness. There is no rebound and no guarding.   Musculoskeletal:         General: No tenderness.      Cervical back: Normal range of motion and neck supple.      Right lower leg: Edema present.     Neurological: He is alert. He has normal strength. No cranial nerve deficit or sensory deficit.   Skin: Skin is warm and dry.   Psychiatric: He has a normal mood and affect.       ED Course   Procedures  Labs Reviewed   CBC W/ AUTO DIFFERENTIAL - Abnormal; Notable for the following components:       Result Value    Hemoglobin 12.8 (*)     Hematocrit 39.8 (*)     MCV 78 (*)     MCH 25.0 (*)     Platelets 145 (*)     All other components within normal limits   COMPREHENSIVE METABOLIC PANEL - Abnormal; Notable for the following components:    ALT 9 (*)     eGFR 55 (*)     All other components within normal limits   URINALYSIS, REFLEX TO URINE CULTURE    Narrative:     Specimen Source->Urine   LIPASE          Imaging Results              CT Renal Stone Study ABD Pelvis WO (Final result)  Result time 05/12/23 18:37:06      Final result by Keyshawn Ledezma MD (05/12/23 18:37:06)                   Impression:      1. Bilateral perinephric fat stranding, nonspecific.  Correlation with urinalysis recommended to exclude sequela of infection.  2. Hepatomegaly, correlation with LFTs recommended.  3. Prostatomegaly, correlation with PSA advised.  4. Please see above for several additional findings.      Electronically signed by: Keyshawn Ledezma MD  Date:    05/12/2023  Time:    18:37               Narrative:    EXAMINATION:  CT RENAL STONE STUDY ABD PELVIS WO    CLINICAL HISTORY:  Flank pain, kidney stone suspected;    TECHNIQUE:  Low dose axial images, sagittal and coronal reformations were  obtained from the lung bases to the pubic symphysis.  Contrast was not administered.    COMPARISON:  CT chest 01/03/2022    FINDINGS:  Images of the lower thorax are remarkable for bilateral dependent atelectasis.  The heart is prominent.  Cardiac pacing leads noted.    The liver is enlarged, correlation with LFTs recommended.  There are a few scattered punctate foci of low attenuation within the hepatic parenchyma, too small for characterization.  The spleen, pancreas, and adrenal glands have a grossly unremarkable noncontrast appearance.  There is cholelithiasis without secondary findings to suggest acute cholecystitis.  The stomach is decompressed without wall thickening.  There are a few scattered abdominal lymph nodes.    There is bilateral perinephric fat stranding.  There is a low attenuating lesion arising from the upper pole of the right kidney measuring 2.6 cm, attenuation of which suggests cyst.  There are subcentimeter low attenuating lesions arise from the interpolar region of the left kidney, too small for characterization.  The bilateral ureters are unremarkable without calculi seen.  The urinary bladder is nondistended.  The prostate is enlarged.    There are a few scattered colonic diverticula without inflammation.  The terminal ileum and appendix are unremarkable.  The small bowel is grossly unremarkable.  There are several scattered shotty periaortic, pericaval, and mesenteric lymph nodes.  There is atherosclerotic calcification of the aorta and its branches.  No focal organized pelvic fluid collection.  Calcification noted of the scrotum.    There is osteopenia.  There are prominent degenerative changes of the bilateral femoroacetabular joints.  There are degenerative changes of the pubic symphysis, sacroiliac joints, and spine.  No significant inguinal lymphadenopathy.                                       Medications - No data to display  Medical Decision Making:   History:   Old Medical  Records: I decided to obtain old medical records.  Differential Diagnosis:   Includes but is not limited to: musculoskeletal pain, renal stone, constipation, diverticulitis, colitis  Clinical Tests:   Lab Tests: Ordered and Reviewed  Radiological Study: Ordered and Reviewed  ED Management:  Patient is afebrile and in no acute distress at time history and physical.  He has some left-sided flank discomfort.  He does not have focal abdominal tenderness.  Labs without leukocytosis or granulocytosis suggest infectious process.  There is no significant electrolyte abnormality.  Urinalysis does not suggest UTI.  CT of the abdomen pelvis notes bilateral perinephric fat stranding that is nonspecific.  Given patient's normal urinalysis, adequate renal function have low clinical suspicion of pyelonephritis.  Patient denies difficulty urinating.  Reports he is compliant with Lasix and has no difficulty with his stream.  CT scan does note significant degenerative changes symptoms may be related to musculoskeletal pain.  Patient is clinically stable in the emergency department he is fit for discharge on trial of management with analgesia to follow-up with primary care physician.  Patient and family at the bedside counseled on supportive care, appropriate medication usage, concerning symptoms for which to return to ER and the importance of follow up. Understanding and agreement with treatment plan was expressed.   This chart was completed using dictation software, as a result there may be some transcription errors.           Scribe Attestation:   Scribe #1: I performed the above scribed service and the documentation accurately describes the services I performed. I attest to the accuracy of the note.            I, Rut Cobb , personally performed the services described in this documentation.  All medical record entries made by the scribe were at my direction and in my presence.  I have reviewed the chart and agree that the  record reflects my personal performance and is accurate and complete.       Clinical Impression:   Final diagnoses:  [R10.9] Left flank pain (Primary)  [M53.9] Multilevel degenerative disc disease        ED Disposition Condition    Discharge Stable          ED Prescriptions       Medication Sig Dispense Start Date End Date Auth. Provider    acetaminophen (TYLENOL) 500 MG tablet Take 2 tablets (1,000 mg total) by mouth every 8 (eight) hours as needed for Pain or Temperature greater than (100.5). 30 tablet 5/12/2023 -- Rut Cobb MD    meloxicam (MOBIC) 15 MG tablet Take 1 tablet (15 mg total) by mouth daily as needed for Pain. 30 tablet 5/12/2023 -- Rut Cobb MD    methocarbamoL (ROBAXIN) 500 MG Tab Take 2 tablets (1,000 mg total) by mouth 3 (three) times daily as needed (Pain not improved by other medications). 30 tablet 5/12/2023 -- Rut Cobb MD          Follow-up Information       Follow up With Specialties Details Why Contact Info    Brynn To MD Family Medicine Schedule an appointment as soon as possible for a visit   [address] [place] [address]  [place] [address]  [phone]               Rut Cobb MD  05/14/23 0334

## 2023-05-13 NOTE — DISCHARGE INSTRUCTIONS
Emergency department testing is within acceptable ranges.  CT scan does not show infection or kidney stone.  Urinalysis does not show UTI.  Your symptoms may be related to arthritis.  Use Tylenol as needed for pain.  You can attempt meloxicam daily as needed for pain not improved by Tylenol.  Schedule close follow-up with your primary physician.  Return to the emergency department for any new, worsening or significantly concerning symptoms.    Thank you for coming to our Emergency Department today. It is important to remember that some problems are difficult to diagnose and may not be found during your first visit. Be sure to follow up with your primary care doctor and review any labs/imaging that was performed with them. If you do not have a primary care doctor, you may contact the one listed on your discharge paperwork or you may also call the [facility] Appointment Desk at [phone] to schedule an appointment with one.     All medications may potentially have side effects and it is impossible to predict which medications may give you side effects. If you feel that you are having a negative effect of any medication you should immediately stop taking them and seek medical attention.    Return to the ER with any questions/concerns, new/concerning symptoms, worsening or failure to improve. Do not drive or make any important decisions for 24 hours if you have received any pain medications, sedatives or mood altering drugs during your ER visit.

## 2023-05-16 DIAGNOSIS — Z86.39 HISTORY OF LOW POTASSIUM: ICD-10-CM

## 2023-05-16 DIAGNOSIS — I50.42 CHRONIC COMBINED SYSTOLIC AND DIASTOLIC CONGESTIVE HEART FAILURE: ICD-10-CM

## 2023-05-16 NOTE — TELEPHONE ENCOUNTER
Care Due:                  Date            Visit Type   Department     Provider  --------------------------------------------------------------------------------                                [facility]                              PRIMARY      MEDICINE/  Last Visit: 03-      CARE ([facility])   LIAM To                              [facility]                              PRIMARY      MEDICINE/  Next Visit: 06-      CARE ([facility])   LIAM Hays  Test          Frequency    Reason                     Performed    Due Date  --------------------------------------------------------------------------------    Lipid Panel.  12 months..  pravastatin..............  07- 07-    Health [facility] Embedded Care Due Messages. Reference number: [id number].   5/16/2023 1:21:22 PM CDT

## 2023-05-17 RX ORDER — POTASSIUM CHLORIDE 1500 MG/1
TABLET, EXTENDED RELEASE ORAL
Qty: 90 TABLET | Refills: 1 | Status: ON HOLD | OUTPATIENT
Start: 2023-05-17 | End: 2024-01-10 | Stop reason: HOSPADM

## 2023-05-17 RX ORDER — FUROSEMIDE 80 MG/1
TABLET ORAL
Qty: 90 TABLET | Refills: 1 | Status: ON HOLD | OUTPATIENT
Start: 2023-05-17 | End: 2024-01-10 | Stop reason: HOSPADM

## 2023-05-19 ENCOUNTER — PES CALL (OUTPATIENT)
Dept: ADMINISTRATIVE | Facility: CLINIC | Age: 68
End: 2023-05-19
Payer: MEDICARE

## 2023-06-12 ENCOUNTER — OFFICE VISIT (OUTPATIENT)
Dept: FAMILY MEDICINE | Facility: CLINIC | Age: 68
End: 2023-06-12
Payer: MEDICARE

## 2023-06-12 VITALS
TEMPERATURE: 98 F | SYSTOLIC BLOOD PRESSURE: 114 MMHG | RESPIRATION RATE: 16 BRPM | HEIGHT: 77 IN | DIASTOLIC BLOOD PRESSURE: 80 MMHG | HEART RATE: 60 BPM | WEIGHT: 310.44 LBS | OXYGEN SATURATION: 98 % | BODY MASS INDEX: 36.65 KG/M2

## 2023-06-12 DIAGNOSIS — Z95.810 BIVENTRICULAR ICD (IMPLANTABLE CARDIOVERTER-DEFIBRILLATOR) IN PLACE: ICD-10-CM

## 2023-06-12 DIAGNOSIS — I50.42 CHRONIC COMBINED SYSTOLIC AND DIASTOLIC CONGESTIVE HEART FAILURE: Primary | ICD-10-CM

## 2023-06-12 DIAGNOSIS — I10 ESSENTIAL HYPERTENSION: Chronic | ICD-10-CM

## 2023-06-12 PROCEDURE — 3074F PR MOST RECENT SYSTOLIC BLOOD PRESSURE < 130 MM HG: ICD-10-PCS | Mod: CPTII,S$GLB,, | Performed by: FAMILY MEDICINE

## 2023-06-12 PROCEDURE — 1159F MED LIST DOCD IN RCRD: CPT | Mod: CPTII,S$GLB,, | Performed by: FAMILY MEDICINE

## 2023-06-12 PROCEDURE — 99999 PR PBB SHADOW E&M-EST. PATIENT-LVL V: CPT | Mod: PBBFAC,,, | Performed by: FAMILY MEDICINE

## 2023-06-12 PROCEDURE — 1159F PR MEDICATION LIST DOCUMENTED IN MEDICAL RECORD: ICD-10-PCS | Mod: CPTII,S$GLB,, | Performed by: FAMILY MEDICINE

## 2023-06-12 PROCEDURE — 3008F BODY MASS INDEX DOCD: CPT | Mod: CPTII,S$GLB,, | Performed by: FAMILY MEDICINE

## 2023-06-12 PROCEDURE — 3079F PR MOST RECENT DIASTOLIC BLOOD PRESSURE 80-89 MM HG: ICD-10-PCS | Mod: CPTII,S$GLB,, | Performed by: FAMILY MEDICINE

## 2023-06-12 PROCEDURE — 1126F AMNT PAIN NOTED NONE PRSNT: CPT | Mod: CPTII,S$GLB,, | Performed by: FAMILY MEDICINE

## 2023-06-12 PROCEDURE — 3008F PR BODY MASS INDEX (BMI) DOCUMENTED: ICD-10-PCS | Mod: CPTII,S$GLB,, | Performed by: FAMILY MEDICINE

## 2023-06-12 PROCEDURE — 99999 PR PBB SHADOW E&M-EST. PATIENT-LVL V: ICD-10-PCS | Mod: PBBFAC,,, | Performed by: FAMILY MEDICINE

## 2023-06-12 PROCEDURE — 3079F DIAST BP 80-89 MM HG: CPT | Mod: CPTII,S$GLB,, | Performed by: FAMILY MEDICINE

## 2023-06-12 PROCEDURE — 3074F SYST BP LT 130 MM HG: CPT | Mod: CPTII,S$GLB,, | Performed by: FAMILY MEDICINE

## 2023-06-12 PROCEDURE — 99214 OFFICE O/P EST MOD 30 MIN: CPT | Mod: S$GLB,,, | Performed by: FAMILY MEDICINE

## 2023-06-12 PROCEDURE — 1126F PR PAIN SEVERITY QUANTIFIED, NO PAIN PRESENT: ICD-10-PCS | Mod: CPTII,S$GLB,, | Performed by: FAMILY MEDICINE

## 2023-06-12 PROCEDURE — 99214 PR OFFICE/OUTPT VISIT, EST, LEVL IV, 30-39 MIN: ICD-10-PCS | Mod: S$GLB,,, | Performed by: FAMILY MEDICINE

## 2023-06-12 NOTE — PROGRESS NOTES
Chief Complaint   Patient presents with    Hypertension    Congestive Heart Failure       HPI  Papito Bhakta is a 68 y.o. male with multiple medical diagnoses as listed in the medical history and problem list that presents for follow-up for HTN and CHF    He was seen in the ED for left flank pain and had a negative workup, the pain was described as a cramping pain which has since resolved    CHF- he is up 5lbs in his weight, he has eaten some salty foods recently but has otherwise been maintaining his weight, he is not followed by a cardiologist only has pacemaker checks, we discussed a length that he needs follow up due to his EF being at 18%, he is not very trusting as there were complications during his pacemaker insertion      ALLERGIES AND MEDICATIONS: updated and reviewed.  Review of patient's allergies indicates:  No Known Allergies  Medication List with Changes/Refills   Current Medications    ACETAMINOPHEN (TYLENOL) 500 MG TABLET    Take 2 tablets (1,000 mg total) by mouth every 8 (eight) hours as needed for Pain or Temperature greater than (100.5).    AMIODARONE (PACERONE) 200 MG TAB    Take 1 tablet (200 mg total) by mouth once daily.    ASPIRIN (ECOTRIN) 325 MG EC TABLET    Take 1 tablet (325 mg total) by mouth once daily.    FUROSEMIDE (LASIX) 80 MG TABLET    TAKE 1 TABLET EVERY DAY    GABAPENTIN (NEURONTIN) 100 MG CAPSULE    Take 1 capsule (100 mg total) by mouth 3 (three) times daily. Take 100 mg by mouth 3 (three) times daily.    MELOXICAM (MOBIC) 15 MG TABLET    Take 1 tablet (15 mg total) by mouth daily as needed for Pain.    METHOCARBAMOL (ROBAXIN) 500 MG TAB    Take 2 tablets (1,000 mg total) by mouth 3 (three) times daily as needed (Pain not improved by other medications).    METOPROLOL SUCCINATE (TOPROL-XL) 50 MG 24 HR TABLET    TAKE 1 TABLET EVERY DAY    POTASSIUM CHLORIDE (K-TAB) 20 MEQ    TAKE 1 TABLET EVERY DAY    PRAVASTATIN (PRAVACHOL) 80 MG TABLET    TAKE 1 TABLET EVERY DAY     "SACUBITRIL/VALSARTAN (ENTRESTO ORAL)    Take 1 tablet by mouth once daily. Mg unknown, did not bring.    SPIRONOLACTONE (ALDACTONE) 25 MG TABLET    TAKE 1/2 TABLET (12.5 MG TOTAL) ONCE DAILY. HOLD IF SYSTOLIC BLOOD PRESSURE IS LESS THAN 110       ROS  Review of Systems   Constitutional:  Negative for chills, fatigue, fever and unexpected weight change.   HENT:  Negative for ear pain, postnasal drip, rhinorrhea, sinus pressure and sore throat.    Eyes:  Negative for photophobia and visual disturbance.   Respiratory:  Positive for shortness of breath. Negative for apnea, cough, chest tightness and wheezing.    Cardiovascular:  Negative for chest pain and palpitations.   Gastrointestinal:  Negative for abdominal pain, blood in stool, constipation, diarrhea, nausea and vomiting.   Genitourinary:  Negative for difficulty urinating.   Musculoskeletal:  Negative for arthralgias and joint swelling.   Skin:  Negative for rash.   Neurological:  Negative for facial asymmetry, speech difficulty, weakness, numbness and headaches.   Psychiatric/Behavioral:  Negative for dysphoric mood.      Physical Exam  Vitals:    06/12/23 1122   BP: 114/80   BP Location: Left arm   Patient Position: Sitting   BP Method: X-Large (Manual)   Pulse: 60   Resp: 16   Temp: 98 °F (36.7 °C)   TempSrc: Oral   SpO2: 98%   Weight: (!) 140.8 kg (310 lb 6.5 oz)   Height: 6' 5" (1.956 m)    Body mass index is 36.81 kg/m².  Weight: (!) 140.8 kg (310 lb 6.5 oz)   Height: 6' 5" (195.6 cm)     Physical Exam  Vitals and nursing note reviewed.   Constitutional:       Appearance: He is well-developed.   HENT:      Head: Normocephalic and atraumatic.   Skin:     General: Skin is warm and dry.      Findings: No rash.   Neurological:      Mental Status: He is alert. Mental status is at baseline.   Psychiatric:         Behavior: Behavior normal.       Health Maintenance         Date Due Completion Date    COVID-19 Vaccine (5 - Moderna series) 12/29/2022 11/3/2022    " LDCT Lung Screen 01/03/2023 1/3/2022    Pneumococcal Vaccines (Age 65+) (3 - PPSV23 if available, else PCV20) 06/06/2023 7/22/2020    Hemoglobin A1c (Prediabetes) 07/18/2023 7/18/2022    Colorectal Cancer Screening 10/10/2026 10/10/2016 (Done)    Override on 10/10/2016: Done    Lipid Panel 07/18/2027 7/18/2022    TETANUS VACCINE 06/06/2028 6/6/2018            Health maintenance reviewed and addressed as ordered      ASSESSMENT/PLAN       1. Chronic combined systolic and diastolic congestive heart failure  I strongly recommend he resume cardiology follow up due to his condition, I have explained that he is getting a device checked but not following up for his heart    2. Essential hypertension  stable    3. Biventricular ICD (implantable cardioverter-defibrillator) in place  Follow up for pacemaker checks        Brynn To MD  06/14/2023 11:36 AM        Follow up in about 3 months (around 9/12/2023).    No orders of the defined types were placed in this encounter.

## 2023-06-14 ENCOUNTER — CLINICAL SUPPORT (OUTPATIENT)
Dept: CARDIOLOGY | Facility: HOSPITAL | Age: 68
End: 2023-06-14
Payer: MEDICARE

## 2023-06-14 DIAGNOSIS — Z95.810 PRESENCE OF AUTOMATIC (IMPLANTABLE) CARDIAC DEFIBRILLATOR: ICD-10-CM

## 2023-06-14 PROCEDURE — 93296 REM INTERROG EVL PM/IDS: CPT | Performed by: INTERNAL MEDICINE

## 2023-06-14 PROCEDURE — 93295 DEV INTERROG REMOTE 1/2/MLT: CPT | Mod: ,,, | Performed by: INTERNAL MEDICINE

## 2023-06-14 PROCEDURE — 93295 CARDIAC DEVICE CHECK - REMOTE: ICD-10-PCS | Mod: ,,, | Performed by: INTERNAL MEDICINE

## 2023-06-20 ENCOUNTER — OFFICE VISIT (OUTPATIENT)
Dept: PODIATRY | Facility: CLINIC | Age: 68
End: 2023-06-20
Payer: MEDICARE

## 2023-06-20 VITALS — BODY MASS INDEX: 36.65 KG/M2 | WEIGHT: 310.44 LBS | HEIGHT: 77 IN

## 2023-06-20 DIAGNOSIS — B35.1 ONYCHOMYCOSIS DUE TO DERMATOPHYTE: ICD-10-CM

## 2023-06-20 DIAGNOSIS — I73.9 PERIPHERAL VASCULAR DISEASE, UNSPECIFIED: Primary | ICD-10-CM

## 2023-06-20 DIAGNOSIS — L84 CORN OR CALLUS: ICD-10-CM

## 2023-06-20 PROCEDURE — 99999 PR PBB SHADOW E&M-EST. PATIENT-LVL III: CPT | Mod: PBBFAC,,, | Performed by: PODIATRIST

## 2023-06-20 PROCEDURE — 99999 PR PBB SHADOW E&M-EST. PATIENT-LVL III: ICD-10-PCS | Mod: PBBFAC,,, | Performed by: PODIATRIST

## 2023-06-20 PROCEDURE — 99499 NO LOS: ICD-10-PCS | Mod: S$GLB,,, | Performed by: PODIATRIST

## 2023-06-20 PROCEDURE — 11056 PARNG/CUTG B9 HYPRKR LES 2-4: CPT | Mod: Q9,S$GLB,, | Performed by: PODIATRIST

## 2023-06-20 PROCEDURE — 99499 UNLISTED E&M SERVICE: CPT | Mod: S$GLB,,, | Performed by: PODIATRIST

## 2023-06-20 PROCEDURE — 11721 PR DEBRIDEMENT OF NAILS, 6 OR MORE: ICD-10-PCS | Mod: 59,Q9,S$GLB, | Performed by: PODIATRIST

## 2023-06-20 PROCEDURE — 11056 PR TRIM BENIGN HYPERKERATOTIC SKIN LESION,2-4: ICD-10-PCS | Mod: Q9,S$GLB,, | Performed by: PODIATRIST

## 2023-06-20 PROCEDURE — 11721 DEBRIDE NAIL 6 OR MORE: CPT | Mod: 59,Q9,S$GLB, | Performed by: PODIATRIST

## 2023-06-20 NOTE — PROGRESS NOTES
Subjective:      Patient ID: Papito Bhakta is a 68 y.o. male.    Chief Complaint: Diabetes Mellitus (6/12/23 Dr To PCP) and Nail Care      Papito is a 68 y.o. male who presents to the clinic for evaluation and treatment of high risk feet. Papito has a past medical history of RIGOBERTO (acute kidney injury) (10/7/2020), Anticoagulant long-term use, CHF (congestive heart failure), Hyperlipidemia, Hypertension, NICM (nonischemic cardiomyopathy) (6/16/2020), Obesity, AMELIA (obstructive sleep apnea) (1/7/2022), and Peripheral vascular disease, unspecified (2/1/2021). The patient's chief complaint is long, thick toenails. This patient has documented high risk feet requiring routine maintenance secondary to peripheral vascular disease. Reports elongated nails that are difficult to trim. Painful callus right plantar foot.     PCP: Brynn To MD    Date Last Seen by PCP: 2/14/22    Current shoe gear:  Affected Foot: Tennis shoes     Unaffected Foot: Tennis shoes    Last encounter in this department: Visit date not found    Hemoglobin A1C   Date Value Ref Range Status   07/18/2022 5.6 4.0 - 5.6 % Final     Comment:     ADA Screening Guidelines:  5.7-6.4%  Consistent with prediabetes  >or=6.5%  Consistent with diabetes    High levels of fetal hemoglobin interfere with the HbA1C  assay. Heterozygous hemoglobin variants (HbS, HgC, etc)do  not significantly interfere with this assay.   However, presence of multiple variants may affect accuracy.     11/19/2020 6.2 (H) 4.0 - 5.6 % Final     Comment:     ADA Screening Guidelines:  5.7-6.4%  Consistent with prediabetes  >or=6.5%  Consistent with diabetes  High levels of fetal hemoglobin interfere with the HbA1C  assay. Heterozygous hemoglobin variants (HbS, HgC, etc)do  not significantly interfere with this assay.   However, presence of multiple variants may affect accuracy.         Review of Systems   Constitutional: Negative for chills.   Cardiovascular:  Negative for chest pain  and claudication.   Respiratory:  Negative for cough.    Skin:  Positive for color change, dry skin and nail changes.   Musculoskeletal:  Positive for joint pain.   Gastrointestinal:  Negative for nausea.   Neurological:  Positive for paresthesias. Negative for numbness.   Psychiatric/Behavioral:  The patient is not nervous/anxious.          Objective:      Physical Exam  Constitutional:       Appearance: He is well-developed.   Cardiovascular:      Comments: Dorsalis pedis and posterior tibial pulses are diminished Bilaterally. Toes are cool to touch. Feet are warm proximally.There is decreased digital hair. Skin is atrophic, slightly hyperpigmented, and mildly edematous   Pulmonary:      Effort: No respiratory distress.   Musculoskeletal:         General: Tenderness present.      Comments: Adequate joint range of motion without pain, limitation, nor crepitation Bilateral feet and ankle joints. Muscle strength is 5/5 in all groups bilaterally.    Feet:      Right foot:      Skin integrity: Callus and dry skin present. No ulcer or skin breakdown.      Left foot:      Skin integrity: Dry skin present. No ulcer.   Skin:     General: Skin is dry.      Findings: No erythema.      Comments: Nails x10 are elongated by 2-6mm's, thickened by 3-5 mm's, dystrophic, and are darkened in coloration . Xerosis Bilaterally. No open lesions noted     Focal hyperkeratotic lesion consisting entirely of hyperkeratotic tissue without open skin, drainage, pus, fluctuance, malodor, or signs of infection: right plantar foot right plantar 2nd toe      Neurological:      Mental Status: He is alert.      Comments: Ault-Sean 5.07 monofilamant testing is diminished Wilman feet. Sharp/dull sensation diminished Bilaterally. Light touch absent Bilaterally.              Assessment:       Encounter Diagnoses   Name Primary?    Peripheral vascular disease, unspecified Yes    Onychomycosis due to dermatophyte     Corn or callus              Plan:        Papito was seen today for diabetes mellitus and nail care.    Diagnoses and all orders for this visit:    Peripheral vascular disease, unspecified    Onychomycosis due to dermatophyte    Corn or callus          I counseled the patient on his conditions, their implications and medical management.      - Shoe inspection. Diabetic Foot Education. Patient reminded of the importance of good nutrition and blood sugar control to help prevent podiatric complications of diabetes. Patient instructed on proper foot hygeine. We discussed wearing proper shoe gear, daily foot inspections, never walking without protective shoe gear, never putting sharp instruments to feet, routine podiatric nail visits every 2-3 months.   - With patient's permission, nails were aggressively reduced and debrided x 10 to their soft tissue attachment mechanically and with electric , removing all offending nail and debris. Patient relates relief following the procedure. He will continue to monitor the areas daily, inspect his feet, wear protective shoe gear when ambulatory, moisturizer to maintain skin integrity and follow in this office in approximately 2-3 months, sooner p.r.n.     - After cleansing the area w/ alcohol prep pad the above mentioned hyperkeratosis was trimmed utilizing No 3 curette right plantar foot.

## 2023-07-31 ENCOUNTER — PES CALL (OUTPATIENT)
Dept: ADMINISTRATIVE | Facility: CLINIC | Age: 68
End: 2023-07-31
Payer: MEDICARE

## 2023-08-01 ENCOUNTER — TELEPHONE (OUTPATIENT)
Dept: ELECTROPHYSIOLOGY | Facility: CLINIC | Age: 68
End: 2023-08-01
Payer: MEDICARE

## 2023-08-01 DIAGNOSIS — I42.8 NICM (NONISCHEMIC CARDIOMYOPATHY): Primary | Chronic | ICD-10-CM

## 2023-08-01 NOTE — PROGRESS NOTES
"Mr. Bhakta is a patient of Dr. Kwong and was last seen in clinic 10/26/2022.      Subjective:   Patient ID:  Papito Bhakta is a 68 y.o. male who presents for follow up of CRT-D  .     HPI:    Mr. Bhakta is a 68 y.o. male with HTN, HLD, NICM s/p CRT-D (2/13/2019, extracted 6/17/2020, reimplanted rt side 9/28/2020) here for follow up.     Background:    Cardiologist: Makayla Long MD.    6/15/2020: Mr. Bhakta is a 65-year-old male with a past medical history significant for hypertension, hyperlipidemia, nonischemic cardiomyopathy status post Bi-V ICD 02/13/2019 [employer] with Dr. Shailesh Eng  EF 10%.  He notes increased drainage from his pocket site over the past 1 week.  Yesterday morning he was performing yard work and noticed pain in his left pectoral region with acute dehiscence of his pocket.     6/17/2020: Successful device extraction. Complex/difficult due to dense adhesions on lead, requiring use of a mechanical cutting sheath. Successful excision of necrotic/infected tissue (5x10x3 cm volume). Complex wound closure.  Plan: ABx per ID consult. Follow up with me in clinic at the end of the antibiotic course. LifeVest until a new right-sided biV ICD can be implanted.     Per EP, new ICD to be placed in mid-July or when cleared by ID.    ID appt 7/22/2020: "OK to reimplant PPM on opposite side per EP."    9/28/2020: Successful implantation of ICD BIV placed for primary intervention (challenging/difficult due to R sided anatomy, extremely large heart, acute subclavian/SVC angle).    12/30/2020: Defibrillator fired yesterday around 2 pm. Looks like he went into ventricular fibrillation. He says he was doing some floor exercises when he felt the shock. His beta blocker was increased.    1/15/2021: He is almost 4 mo s/p rt-sided CRT-D implantation. Had ICD discharge shortly thereafter due to MMVT. Continues to have NSVT episodes. Plan per Dr. Kwong is to increase metoprolol and start amiodarone. Now taking " metoprolol 50mg. BP too low for additional increase. Will add amiodarone per plan. Stable from device standpoint. 93% Biventricular pacing, which may increase if amio reduces PVC %. He is feeling well.     5/12/2021: Mr. Bhakta is doing well from a device perspective with stable lead and device function. Now on amiodarone for MMVT (started Jan 2021). No subsequent arrhythmia noted. 95% biventricular pacing. On GDMT. No CHF symptoms. He feels well.    4/28/2022: Mr. Bhakta is doing well from a device perspective with stable lead and device function. No arrhythmia noted. On amiodarone for VT with stable amio tests. 99% biventricular pacing. No hospitalizations for CHF since last clinic visit. On GDMT.    10/27/2022: Mr. Bhakta is doing well from a device perspective with stable lead and device function. No arrhythmia noted. On amiodarone for VT. Amio labs ok. 96% biventricular pacing. No CHF symptoms. He is feeling well.    Update (08/02/2023):    Today he reports chronic KING. On lasix daily but does have to double dose PRN. Some fatigue, leg edema. No exertional CP, palps, LH, syncope reported.    He is currently being treated with amiodarone 200mg daily for rhythm (VT) control and toprol 50mg daily for HR control.  Kidney function is stable, with a creatinine of 1.4 on 5/12/2023. TSH WNL 7/2022. LFTs WNL 5/12/2023. PFTs 12/2021 DLCO ratio 70.8.    Device Interrogation (8/2/2023) reveals an intrinsic SR with 1st deg AVB. A sense has trended down somewhat since Feb and LV capture has trended up since Feb. No arrhythmias or treated episodes were noted. He paces 61% in the RA and 93% in the BiV. Estimated battery longevity 4.3 years.     I have personally reviewed the patient's EKG today, which shows ASVP with occ PVC at 62bpm. DE interval is 188. QRS is 182. QTc is 542.    Relevant Cardiac Test Results:    2D Echo (9/13/2020):  The left ventricle is severely enlarged with severely decreased systolic function. The  estimated ejection fraction is 18%.  There are segmental left ventricular wall motion abnormalities.  There is moderate left ventricular eccentric hypertrophy.  Grade III diastolic dysfunction.  Severe left atrial enlargement.  Moderate right ventricular enlargement.  Low normal right ventricular systolic function.  Severe right atrial enlargement.  Moderate mitral regurgitation.  Mild to moderate tricuspid regurgitation.  There is pulmonary hypertension at least in the 50s.    Current Outpatient Medications   Medication Sig    acetaminophen (TYLENOL) 500 MG tablet Take 2 tablets (1,000 mg total) by mouth every 8 (eight) hours as needed for Pain or Temperature greater than (100.5).    amiodarone (PACERONE) 200 MG Tab Take 1 tablet (200 mg total) by mouth once daily.    aspirin (ECOTRIN) 325 MG EC tablet Take 1 tablet (325 mg total) by mouth once daily.    furosemide (LASIX) 80 MG tablet TAKE 1 TABLET EVERY DAY    gabapentin (NEURONTIN) 100 MG capsule Take 1 capsule (100 mg total) by mouth 3 (three) times daily. Take 100 mg by mouth 3 (three) times daily.    meloxicam (MOBIC) 15 MG tablet Take 1 tablet (15 mg total) by mouth daily as needed for Pain.    methocarbamoL (ROBAXIN) 500 MG Tab Take 2 tablets (1,000 mg total) by mouth 3 (three) times daily as needed (Pain not improved by other medications).    metoprolol succinate (TOPROL-XL) 50 MG 24 hr tablet TAKE 1 TABLET EVERY DAY    potassium chloride (K-TAB) 20 mEq TAKE 1 TABLET EVERY DAY    pravastatin (PRAVACHOL) 80 MG tablet TAKE 1 TABLET EVERY DAY    sacubitril/valsartan (ENTRESTO ORAL) Take 1 tablet by mouth once daily. Mg unknown, did not bring.    spironolactone (ALDACTONE) 25 MG tablet TAKE 1/2 TABLET (12.5 MG TOTAL) ONCE DAILY. HOLD IF SYSTOLIC BLOOD PRESSURE IS LESS THAN 110     No current facility-administered medications for this visit.       Review of Systems   Constitutional: Positive for malaise/fatigue.   Cardiovascular:  Positive for dyspnea on  "exertion and leg swelling. Negative for chest pain, irregular heartbeat and palpitations.   Respiratory:  Positive for shortness of breath.    Hematologic/Lymphatic: Negative for bleeding problem.   Skin:  Negative for rash.   Musculoskeletal:  Negative for myalgias.   Gastrointestinal:  Negative for hematemesis, hematochezia and nausea.   Genitourinary:  Negative for hematuria.   Neurological:  Negative for light-headedness.   Psychiatric/Behavioral:  Negative for altered mental status.    Allergic/Immunologic: Negative for persistent infections.       Objective:          /76   Pulse 62   Ht 6' 5" (1.956 m)   Wt (!) 137.2 kg (302 lb 7.5 oz)   BMI 35.87 kg/m²     Physical Exam  Vitals and nursing note reviewed.   Constitutional:       Appearance: Normal appearance. He is well-developed.   HENT:      Head: Normocephalic.      Nose: Nose normal.   Eyes:      Pupils: Pupils are equal, round, and reactive to light.   Cardiovascular:      Rate and Rhythm: Normal rate and regular rhythm.   Pulmonary:      Effort: No respiratory distress.      Breath sounds: Normal breath sounds.   Chest:      Comments: Device to LUCW. Incision and pocket in good repair.    Musculoskeletal:         General: Normal range of motion.   Skin:     General: Skin is warm and dry.      Findings: No erythema.   Neurological:      Mental Status: He is alert and oriented to person, place, and time.   Psychiatric:         Speech: Speech normal.         Behavior: Behavior normal.           Lab Results   Component Value Date     05/12/2023    K 3.9 05/12/2023    MG 1.7 11/21/2020    BUN 21 05/12/2023    CREATININE 1.4 05/12/2023    ALT 9 (L) 05/12/2023    AST 18 05/12/2023    HGB 12.8 (L) 05/12/2023    HCT 39.8 (L) 05/12/2023    HCT 37 09/28/2020    TSH 1.729 07/18/2022    LDLCALC 79.0 07/18/2022       Recent Labs   Lab 09/25/20  0940 03/31/21  1808 04/02/21  1157   INR 1.2 1.0 1.0       Assessment:     1. Biventricular ICD (implantable " cardioverter-defibrillator) in place    2. NICM (nonischemic cardiomyopathy)    3. Essential hypertension    4. Chronic combined systolic and diastolic congestive heart failure    5. Ventricular tachycardia    6. AMELIA (obstructive sleep apnea)    7. Severe obesity (BMI 35.0-39.9) with comorbidity    8. On amiodarone therapy        Plan:     In summary, Mr. Bhakta is a 68 y.o. male with HTN, HLD, NICM s/p CRT-D (2/13/2019, extracted 6/17/2020, reimplanted rt side 9/28/2020) here for follow up.   Pt here for routine follow up. Device function stable. RA lead sense down slightly since Feb and LV capture up slightly since Feb. Will continue to monitor trend.   93% biV pacing although review of trend shows this is higher recently. Stable ECG. He is reporting chronic KING and edema - he is overdue for visit with general cardiology.  On amiodarone for VT. No arrhythmias noted. Update amio testing.    Update TSH and PFTs  Appt with general cardiology.  Continue current medication regimen and device settings.   Follow up in device clinic as scheduled.   Follow up in EP clinic in 6 mo, sooner as needed.     *A copy of this note has been sent to Dr. Kwong*    Follow up in about 6 months (around 2/2/2024).    ------------------------------------------------------------------    SERINA Riggs, NP-C  Cardiac Electrophysiology

## 2023-08-02 ENCOUNTER — OFFICE VISIT (OUTPATIENT)
Dept: ELECTROPHYSIOLOGY | Facility: CLINIC | Age: 68
End: 2023-08-02
Payer: MEDICARE

## 2023-08-02 ENCOUNTER — CLINICAL SUPPORT (OUTPATIENT)
Dept: CARDIOLOGY | Facility: HOSPITAL | Age: 68
End: 2023-08-02
Attending: NURSE PRACTITIONER
Payer: MEDICARE

## 2023-08-02 VITALS
HEIGHT: 77 IN | WEIGHT: 302.5 LBS | HEART RATE: 62 BPM | DIASTOLIC BLOOD PRESSURE: 76 MMHG | BODY MASS INDEX: 35.72 KG/M2 | SYSTOLIC BLOOD PRESSURE: 116 MMHG

## 2023-08-02 DIAGNOSIS — Z95.810 BIVENTRICULAR ICD (IMPLANTABLE CARDIOVERTER-DEFIBRILLATOR) IN PLACE: Primary | ICD-10-CM

## 2023-08-02 DIAGNOSIS — E66.01 SEVERE OBESITY (BMI 35.0-39.9) WITH COMORBIDITY: ICD-10-CM

## 2023-08-02 DIAGNOSIS — Z79.899 ON AMIODARONE THERAPY: ICD-10-CM

## 2023-08-02 DIAGNOSIS — I10 ESSENTIAL HYPERTENSION: Chronic | ICD-10-CM

## 2023-08-02 DIAGNOSIS — I50.42 CHRONIC COMBINED SYSTOLIC AND DIASTOLIC CONGESTIVE HEART FAILURE: ICD-10-CM

## 2023-08-02 DIAGNOSIS — I42.8 NICM (NONISCHEMIC CARDIOMYOPATHY): Chronic | ICD-10-CM

## 2023-08-02 DIAGNOSIS — I47.20 VENTRICULAR TACHYCARDIA: ICD-10-CM

## 2023-08-02 DIAGNOSIS — G47.33 OSA (OBSTRUCTIVE SLEEP APNEA): ICD-10-CM

## 2023-08-02 PROCEDURE — 93010 RHYTHM STRIP: ICD-10-PCS | Mod: S$GLB,,, | Performed by: INTERNAL MEDICINE

## 2023-08-02 PROCEDURE — 93284 CARDIAC DEVICE CHECK - IN CLINIC & HOSPITAL: ICD-10-PCS | Mod: 26,,, | Performed by: NURSE PRACTITIONER

## 2023-08-02 PROCEDURE — 3078F DIAST BP <80 MM HG: CPT | Mod: CPTII,S$GLB,, | Performed by: NURSE PRACTITIONER

## 2023-08-02 PROCEDURE — 93284 PRGRMG EVAL IMPLANTABLE DFB: CPT

## 2023-08-02 PROCEDURE — 3008F BODY MASS INDEX DOCD: CPT | Mod: CPTII,S$GLB,, | Performed by: NURSE PRACTITIONER

## 2023-08-02 PROCEDURE — 1159F MED LIST DOCD IN RCRD: CPT | Mod: CPTII,S$GLB,, | Performed by: NURSE PRACTITIONER

## 2023-08-02 PROCEDURE — 3288F FALL RISK ASSESSMENT DOCD: CPT | Mod: CPTII,S$GLB,, | Performed by: NURSE PRACTITIONER

## 2023-08-02 PROCEDURE — 1101F PR PT FALLS ASSESS DOC 0-1 FALLS W/OUT INJ PAST YR: ICD-10-PCS | Mod: CPTII,S$GLB,, | Performed by: NURSE PRACTITIONER

## 2023-08-02 PROCEDURE — 1160F RVW MEDS BY RX/DR IN RCRD: CPT | Mod: CPTII,S$GLB,, | Performed by: NURSE PRACTITIONER

## 2023-08-02 PROCEDURE — 1160F PR REVIEW ALL MEDS BY PRESCRIBER/CLIN PHARMACIST DOCUMENTED: ICD-10-PCS | Mod: CPTII,S$GLB,, | Performed by: NURSE PRACTITIONER

## 2023-08-02 PROCEDURE — 93005 RHYTHM STRIP: ICD-10-PCS | Mod: S$GLB,,, | Performed by: INTERNAL MEDICINE

## 2023-08-02 PROCEDURE — 99999 PR PBB SHADOW E&M-EST. PATIENT-LVL IV: CPT | Mod: PBBFAC,,, | Performed by: NURSE PRACTITIONER

## 2023-08-02 PROCEDURE — 1159F PR MEDICATION LIST DOCUMENTED IN MEDICAL RECORD: ICD-10-PCS | Mod: CPTII,S$GLB,, | Performed by: NURSE PRACTITIONER

## 2023-08-02 PROCEDURE — 3074F PR MOST RECENT SYSTOLIC BLOOD PRESSURE < 130 MM HG: ICD-10-PCS | Mod: CPTII,S$GLB,, | Performed by: NURSE PRACTITIONER

## 2023-08-02 PROCEDURE — 93010 ELECTROCARDIOGRAM REPORT: CPT | Mod: S$GLB,,, | Performed by: INTERNAL MEDICINE

## 2023-08-02 PROCEDURE — 1101F PT FALLS ASSESS-DOCD LE1/YR: CPT | Mod: CPTII,S$GLB,, | Performed by: NURSE PRACTITIONER

## 2023-08-02 PROCEDURE — 3074F SYST BP LT 130 MM HG: CPT | Mod: CPTII,S$GLB,, | Performed by: NURSE PRACTITIONER

## 2023-08-02 PROCEDURE — 93005 ELECTROCARDIOGRAM TRACING: CPT | Mod: S$GLB,,, | Performed by: INTERNAL MEDICINE

## 2023-08-02 PROCEDURE — 93284 PRGRMG EVAL IMPLANTABLE DFB: CPT | Mod: 26,,, | Performed by: NURSE PRACTITIONER

## 2023-08-02 PROCEDURE — 1125F AMNT PAIN NOTED PAIN PRSNT: CPT | Mod: CPTII,S$GLB,, | Performed by: NURSE PRACTITIONER

## 2023-08-02 PROCEDURE — 99214 OFFICE O/P EST MOD 30 MIN: CPT | Mod: S$GLB,,, | Performed by: NURSE PRACTITIONER

## 2023-08-02 PROCEDURE — 99214 PR OFFICE/OUTPT VISIT, EST, LEVL IV, 30-39 MIN: ICD-10-PCS | Mod: S$GLB,,, | Performed by: NURSE PRACTITIONER

## 2023-08-02 PROCEDURE — 3078F PR MOST RECENT DIASTOLIC BLOOD PRESSURE < 80 MM HG: ICD-10-PCS | Mod: CPTII,S$GLB,, | Performed by: NURSE PRACTITIONER

## 2023-08-02 PROCEDURE — 3288F PR FALLS RISK ASSESSMENT DOCUMENTED: ICD-10-PCS | Mod: CPTII,S$GLB,, | Performed by: NURSE PRACTITIONER

## 2023-08-02 PROCEDURE — 1125F PR PAIN SEVERITY QUANTIFIED, PAIN PRESENT: ICD-10-PCS | Mod: CPTII,S$GLB,, | Performed by: NURSE PRACTITIONER

## 2023-08-02 PROCEDURE — 3008F PR BODY MASS INDEX (BMI) DOCUMENTED: ICD-10-PCS | Mod: CPTII,S$GLB,, | Performed by: NURSE PRACTITIONER

## 2023-08-02 PROCEDURE — 99999 PR PBB SHADOW E&M-EST. PATIENT-LVL IV: ICD-10-PCS | Mod: PBBFAC,,, | Performed by: NURSE PRACTITIONER

## 2023-08-18 ENCOUNTER — HOSPITAL ENCOUNTER (OUTPATIENT)
Dept: CARDIOLOGY | Facility: HOSPITAL | Age: 68
Discharge: HOME | End: 2023-08-18
Attending: STUDENT IN AN ORGANIZED HEALTH CARE EDUCATION/TRAINING PROGRAM
Payer: MEDICARE

## 2023-08-18 ENCOUNTER — HOSPITAL ENCOUNTER (OUTPATIENT)
Dept: CARDIOLOGY | Facility: CLINIC | Age: 68
Discharge: HOME | End: 2023-08-18
Payer: MEDICARE

## 2023-08-18 ENCOUNTER — TELEPHONE (OUTPATIENT)
Dept: CARDIOLOGY | Facility: CLINIC | Age: 68
End: 2023-08-18
Payer: MEDICARE

## 2023-08-18 ENCOUNTER — OFFICE VISIT (OUTPATIENT)
Dept: CARDIOLOGY | Facility: CLINIC | Age: 68
End: 2023-08-18
Payer: MEDICARE

## 2023-08-18 VITALS
SYSTOLIC BLOOD PRESSURE: 150 MMHG | OXYGEN SATURATION: 97 % | HEART RATE: 63 BPM | WEIGHT: 293.44 LBS | DIASTOLIC BLOOD PRESSURE: 75 MMHG | BODY MASS INDEX: 34.8 KG/M2

## 2023-08-18 VITALS
HEIGHT: 77 IN | WEIGHT: 293 LBS | DIASTOLIC BLOOD PRESSURE: 82 MMHG | SYSTOLIC BLOOD PRESSURE: 132 MMHG | HEART RATE: 74 BPM | BODY MASS INDEX: 34.59 KG/M2

## 2023-08-18 DIAGNOSIS — Z95.810 BIVENTRICULAR ICD (IMPLANTABLE CARDIOVERTER-DEFIBRILLATOR) IN PLACE: ICD-10-CM

## 2023-08-18 DIAGNOSIS — R00.2 PALPITATIONS: Primary | ICD-10-CM

## 2023-08-18 DIAGNOSIS — I50.42 CHRONIC COMBINED SYSTOLIC AND DIASTOLIC CONGESTIVE HEART FAILURE: ICD-10-CM

## 2023-08-18 DIAGNOSIS — I42.8 NICM (NONISCHEMIC CARDIOMYOPATHY): Chronic | ICD-10-CM

## 2023-08-18 DIAGNOSIS — E78.2 MIXED HYPERLIPIDEMIA: Chronic | ICD-10-CM

## 2023-08-18 DIAGNOSIS — R00.2 PALPITATIONS: ICD-10-CM

## 2023-08-18 DIAGNOSIS — I73.9 PERIPHERAL VASCULAR DISEASE, UNSPECIFIED: ICD-10-CM

## 2023-08-18 DIAGNOSIS — I10 ESSENTIAL HYPERTENSION: Primary | Chronic | ICD-10-CM

## 2023-08-18 DIAGNOSIS — G47.33 OSA (OBSTRUCTIVE SLEEP APNEA): ICD-10-CM

## 2023-08-18 DIAGNOSIS — E66.01 SEVERE OBESITY (BMI 35.0-39.9) WITH COMORBIDITY: ICD-10-CM

## 2023-08-18 LAB
ASCENDING AORTA: 3.81 CM
AV INDEX (PROSTH): 0.83
AV MEAN GRADIENT: 2 MMHG
AV PEAK GRADIENT: 5 MMHG
AV VALVE AREA BY VELOCITY RATIO: 3.93 CM²
AV VALVE AREA: 3.92 CM²
AV VELOCITY RATIO: 0.83
BSA FOR ECHO PROCEDURE: 2.69 M2
CV ECHO LV RWT: 0.23 CM
DOP CALC AO PEAK VEL: 1.09 M/S
DOP CALC AO VTI: 17.35 CM
DOP CALC LVOT AREA: 4.7 CM2
DOP CALC LVOT DIAMETER: 2.45 CM
DOP CALC LVOT PEAK VEL: 0.91 M/S
DOP CALC LVOT STROKE VOLUME: 67.99 CM3
DOP CALCLVOT PEAK VEL VTI: 14.43 CM
E WAVE DECELERATION TIME: 148.59 MSEC
E/A RATIO: 6.78
E/E' RATIO: 34.67 M/S
ECHO LV POSTERIOR WALL: 0.96 CM (ref 0.6–1.1)
FRACTIONAL SHORTENING: 5 % (ref 28–44)
INTERVENTRICULAR SEPTUM: 0.95 CM (ref 0.6–1.1)
LA MAJOR: 8.78 CM
LA MINOR: 8.57 CM
LA WIDTH: 6.47 CM
LEFT ATRIUM SIZE: 6.61 CM
LEFT ATRIUM VOLUME INDEX MOD: 102.4 ML/M2
LEFT ATRIUM VOLUME INDEX: 119.9 ML/M2
LEFT ATRIUM VOLUME MOD: 269.39 CM3
LEFT ATRIUM VOLUME: 315.3 CM3
LEFT INTERNAL DIMENSION IN SYSTOLE: 7.77 CM (ref 2.1–4)
LEFT VENTRICLE DIASTOLIC VOLUME INDEX: 137.56 ML/M2
LEFT VENTRICLE DIASTOLIC VOLUME: 361.79 ML
LEFT VENTRICLE MASS INDEX: 152 G/M2
LEFT VENTRICLE SYSTOLIC VOLUME INDEX: 122.8 ML/M2
LEFT VENTRICLE SYSTOLIC VOLUME: 322.84 ML
LEFT VENTRICULAR INTERNAL DIMENSION IN DIASTOLE: 8.18 CM (ref 3.5–6)
LEFT VENTRICULAR MASS: 399.88 G
LV LATERAL E/E' RATIO: 31.2 M/S
LV SEPTAL E/E' RATIO: 39 M/S
MV PEAK A VEL: 0.23 M/S
MV PEAK E VEL: 1.56 M/S
PISA TR MAX VEL: 3.3 M/S
PULM VEIN S/D RATIO: 0.24
PV PEAK D VEL: 0.93 M/S
PV PEAK S VEL: 0.22 M/S
RA MAJOR: 7.16 CM
RA PRESSURE ESTIMATED: 15 MMHG
RA WIDTH: 5.82 CM
RIGHT VENTRICULAR END-DIASTOLIC DIMENSION: 5.4 CM
RV TB RVSP: 18 MMHG
SINUS: 3.68 CM
STJ: 2.59 CM
TDI LATERAL: 0.05 M/S
TDI SEPTAL: 0.04 M/S
TDI: 0.05 M/S
TR MAX PG: 44 MMHG
TRICUSPID ANNULAR PLANE SYSTOLIC EXCURSION: 1.34 CM
TV REST PULMONARY ARTERY PRESSURE: 59 MMHG
Z-SCORE OF LEFT VENTRICULAR DIMENSION IN END DIASTOLE: -9.07
Z-SCORE OF LEFT VENTRICULAR DIMENSION IN END SYSTOLE: -3.34

## 2023-08-18 PROCEDURE — 4010F PR ACE/ARB THEARPY RXD/TAKEN: ICD-10-PCS | Mod: CPTII,GC,S$GLB, | Performed by: STUDENT IN AN ORGANIZED HEALTH CARE EDUCATION/TRAINING PROGRAM

## 2023-08-18 PROCEDURE — 3077F PR MOST RECENT SYSTOLIC BLOOD PRESSURE >= 140 MM HG: ICD-10-PCS | Mod: CPTII,GC,S$GLB, | Performed by: STUDENT IN AN ORGANIZED HEALTH CARE EDUCATION/TRAINING PROGRAM

## 2023-08-18 PROCEDURE — 99204 PR OFFICE/OUTPT VISIT, NEW, LEVL IV, 45-59 MIN: ICD-10-PCS | Mod: 25,GC,S$GLB, | Performed by: STUDENT IN AN ORGANIZED HEALTH CARE EDUCATION/TRAINING PROGRAM

## 2023-08-18 PROCEDURE — 99999 PR PBB SHADOW E&M-EST. PATIENT-LVL IV: ICD-10-PCS | Mod: PBBFAC,GC,, | Performed by: STUDENT IN AN ORGANIZED HEALTH CARE EDUCATION/TRAINING PROGRAM

## 2023-08-18 PROCEDURE — 93306 TTE W/DOPPLER COMPLETE: CPT | Mod: 26,,, | Performed by: INTERNAL MEDICINE

## 2023-08-18 PROCEDURE — 25500020 PHARM REV CODE 255: Performed by: STUDENT IN AN ORGANIZED HEALTH CARE EDUCATION/TRAINING PROGRAM

## 2023-08-18 PROCEDURE — 99204 OFFICE O/P NEW MOD 45 MIN: CPT | Mod: 25,GC,S$GLB, | Performed by: STUDENT IN AN ORGANIZED HEALTH CARE EDUCATION/TRAINING PROGRAM

## 2023-08-18 PROCEDURE — 3077F SYST BP >= 140 MM HG: CPT | Mod: CPTII,GC,S$GLB, | Performed by: STUDENT IN AN ORGANIZED HEALTH CARE EDUCATION/TRAINING PROGRAM

## 2023-08-18 PROCEDURE — 1101F PT FALLS ASSESS-DOCD LE1/YR: CPT | Mod: CPTII,GC,S$GLB, | Performed by: STUDENT IN AN ORGANIZED HEALTH CARE EDUCATION/TRAINING PROGRAM

## 2023-08-18 PROCEDURE — 93000 ELECTROCARDIOGRAM COMPLETE: CPT | Mod: S$GLB,ICN,, | Performed by: INTERNAL MEDICINE

## 2023-08-18 PROCEDURE — 93306 ECHO (CUPID ONLY): ICD-10-PCS | Mod: 26,,, | Performed by: INTERNAL MEDICINE

## 2023-08-18 PROCEDURE — 1159F MED LIST DOCD IN RCRD: CPT | Mod: CPTII,GC,S$GLB, | Performed by: STUDENT IN AN ORGANIZED HEALTH CARE EDUCATION/TRAINING PROGRAM

## 2023-08-18 PROCEDURE — 3078F PR MOST RECENT DIASTOLIC BLOOD PRESSURE < 80 MM HG: ICD-10-PCS | Mod: CPTII,GC,S$GLB, | Performed by: STUDENT IN AN ORGANIZED HEALTH CARE EDUCATION/TRAINING PROGRAM

## 2023-08-18 PROCEDURE — 1159F PR MEDICATION LIST DOCUMENTED IN MEDICAL RECORD: ICD-10-PCS | Mod: CPTII,GC,S$GLB, | Performed by: STUDENT IN AN ORGANIZED HEALTH CARE EDUCATION/TRAINING PROGRAM

## 2023-08-18 PROCEDURE — 3288F PR FALLS RISK ASSESSMENT DOCUMENTED: ICD-10-PCS | Mod: CPTII,GC,S$GLB, | Performed by: STUDENT IN AN ORGANIZED HEALTH CARE EDUCATION/TRAINING PROGRAM

## 2023-08-18 PROCEDURE — 93000 EKG 12-LEAD: ICD-10-PCS | Mod: S$GLB,ICN,, | Performed by: INTERNAL MEDICINE

## 2023-08-18 PROCEDURE — 3288F FALL RISK ASSESSMENT DOCD: CPT | Mod: CPTII,GC,S$GLB, | Performed by: STUDENT IN AN ORGANIZED HEALTH CARE EDUCATION/TRAINING PROGRAM

## 2023-08-18 PROCEDURE — 3078F DIAST BP <80 MM HG: CPT | Mod: CPTII,GC,S$GLB, | Performed by: STUDENT IN AN ORGANIZED HEALTH CARE EDUCATION/TRAINING PROGRAM

## 2023-08-18 PROCEDURE — 93306 TTE W/DOPPLER COMPLETE: CPT

## 2023-08-18 PROCEDURE — 3008F BODY MASS INDEX DOCD: CPT | Mod: CPTII,GC,S$GLB, | Performed by: STUDENT IN AN ORGANIZED HEALTH CARE EDUCATION/TRAINING PROGRAM

## 2023-08-18 PROCEDURE — 99999 PR PBB SHADOW E&M-EST. PATIENT-LVL IV: CPT | Mod: PBBFAC,GC,, | Performed by: STUDENT IN AN ORGANIZED HEALTH CARE EDUCATION/TRAINING PROGRAM

## 2023-08-18 PROCEDURE — 1101F PR PT FALLS ASSESS DOC 0-1 FALLS W/OUT INJ PAST YR: ICD-10-PCS | Mod: CPTII,GC,S$GLB, | Performed by: STUDENT IN AN ORGANIZED HEALTH CARE EDUCATION/TRAINING PROGRAM

## 2023-08-18 PROCEDURE — 3008F PR BODY MASS INDEX (BMI) DOCUMENTED: ICD-10-PCS | Mod: CPTII,GC,S$GLB, | Performed by: STUDENT IN AN ORGANIZED HEALTH CARE EDUCATION/TRAINING PROGRAM

## 2023-08-18 PROCEDURE — 4010F ACE/ARB THERAPY RXD/TAKEN: CPT | Mod: CPTII,GC,S$GLB, | Performed by: STUDENT IN AN ORGANIZED HEALTH CARE EDUCATION/TRAINING PROGRAM

## 2023-08-18 RX ORDER — LOSARTAN POTASSIUM 25 MG/1
25 TABLET ORAL DAILY
Qty: 90 TABLET | Refills: 3 | Status: SHIPPED | OUTPATIENT
Start: 2023-08-18 | End: 2023-09-13

## 2023-08-18 RX ORDER — SACUBITRIL AND VALSARTAN 24; 26 MG/1; MG/1
1 TABLET, FILM COATED ORAL 2 TIMES DAILY
Qty: 180 TABLET | Refills: 3 | Status: SHIPPED | OUTPATIENT
Start: 2023-08-18 | End: 2023-08-18

## 2023-08-18 RX ORDER — LOSARTAN POTASSIUM 25 MG/1
25 TABLET ORAL DAILY
Qty: 90 TABLET | Refills: 3 | Status: SHIPPED | OUTPATIENT
Start: 2023-08-18 | End: 2023-08-18

## 2023-08-18 RX ADMIN — HUMAN ALBUMIN MICROSPHERES AND PERFLUTREN 0.66 MG: 10; .22 INJECTION, SOLUTION INTRAVENOUS at 11:08

## 2023-08-18 NOTE — PROGRESS NOTES
PCP - Brynn To MD  Referring Physician:     Subjective:   Patient ID:  Papito Bhakta is a 68 y.o. y.o. male with HTN, HLD, NICM s/p CRT-D (2/13/2019, extracted due to infection 6/17/2020, reimplanted right side 9/28/2020) here to establish care.     Patient has followed with EP for CRT-D s/p extraction and reimplantation 2020. Denies chest pain, SOB, palpitations, syncope. No ICD shocks. Recent interrogation doing well.     Background:  Cardiologist: Makayla Long MD.     6/15/2020: Mr. Bhakta has a past medical history significant for hypertension, hyperlipidemia, nonischemic cardiomyopathy status post Bi-V ICD 02/13/2019 [employer] with Dr. Shailesh Eng  EF 10%.  He notes increased drainage from his pocket site over the past 1 week.  Yesterday morning he was performing yard work and noticed pain in his left pectoral region with acute dehiscence of his pocket.      9/28/2020: Successful implantation of ICD BIV placed for primary intervention (challenging/difficult due to R sided anatomy, extremely large heart, acute subclavian/SVC angle).     EP visit (08/02/2023):     He reports chronic KING. On lasix daily but does have to double dose PRN. Some fatigue, leg edema. No exertional CP, palps, LH, syncope reported.     He is currently being treated with amiodarone 200mg daily for rhythm (VT) control and toprol 50mg daily for HR control.  Kidney function is stable, with a creatinine of 1.4 on 5/12/2023. TSH WNL 7/2022. LFTs WNL 5/12/2023. PFTs 12/2021 DLCO ratio 70.8.     Device Interrogation (8/2/2023) reveals an intrinsic SR with 1st deg AVB. A sense has trended down somewhat since Feb and LV capture has trended up since Feb. No arrhythmias or treated episodes were noted. He paces 61% in the RA and 93% in the BiV. Estimated battery longevity 4.3 years.     History:     Social History     Tobacco Use    Smoking status: Former     Current packs/day: 0.00     Average packs/day: 0.5 packs/day for 40.0 years  (20.0 ttl pk-yrs)     Types: Cigarettes, Cigars     Start date:      Quit date:      Years since quittin.6    Smokeless tobacco: Never   Substance Use Topics    Alcohol use: Yes     Comment: once a month     Meds:   Review of patient's allergies indicates:  No Known Allergies    Current Outpatient Medications:     acetaminophen (TYLENOL) 500 MG tablet, Take 2 tablets (1,000 mg total) by mouth every 8 (eight) hours as needed for Pain or Temperature greater than (100.5)., Disp: 30 tablet, Rfl: 0    amiodarone (PACERONE) 200 MG Tab, Take 1 tablet (200 mg total) by mouth once daily., Disp: 90 tablet, Rfl: 3    aspirin (ECOTRIN) 325 MG EC tablet, Take 1 tablet (325 mg total) by mouth once daily., Disp: 90 tablet, Rfl: 3    furosemide (LASIX) 80 MG tablet, TAKE 1 TABLET EVERY DAY, Disp: 90 tablet, Rfl: 1    meloxicam (MOBIC) 15 MG tablet, Take 1 tablet (15 mg total) by mouth daily as needed for Pain., Disp: 30 tablet, Rfl: 0    methocarbamoL (ROBAXIN) 500 MG Tab, Take 2 tablets (1,000 mg total) by mouth 3 (three) times daily as needed (Pain not improved by other medications)., Disp: 30 tablet, Rfl: 0    metoprolol succinate (TOPROL-XL) 50 MG 24 hr tablet, TAKE 1 TABLET EVERY DAY, Disp: 90 tablet, Rfl: 3    potassium chloride (K-TAB) 20 mEq, TAKE 1 TABLET EVERY DAY, Disp: 90 tablet, Rfl: 1    pravastatin (PRAVACHOL) 80 MG tablet, TAKE 1 TABLET EVERY DAY, Disp: 90 tablet, Rfl: 1    spironolactone (ALDACTONE) 25 MG tablet, TAKE 1/2 TABLET (12.5 MG TOTAL) ONCE DAILY. HOLD IF SYSTOLIC BLOOD PRESSURE IS LESS THAN 110, Disp: 45 tablet, Rfl: 5    gabapentin (NEURONTIN) 100 MG capsule, Take 1 capsule (100 mg total) by mouth 3 (three) times daily. Take 100 mg by mouth 3 (three) times daily., Disp: 270 capsule, Rfl: 1    sacubitriL-valsartan (ENTRESTO) 24-26 mg per tablet, Take 1 tablet by mouth 2 (two) times daily., Disp: 180 tablet, Rfl: 3    Review of Systems   Constitutional:  Negative for chills, diaphoresis, fever  and malaise/fatigue.   HENT:  Negative for sore throat.    Eyes:  Negative for double vision.   Respiratory:  Negative for cough, shortness of breath and wheezing.    Cardiovascular:  Negative for chest pain, palpitations, orthopnea, leg swelling and PND.   Gastrointestinal:  Negative for abdominal pain, blood in stool, constipation, heartburn, nausea and vomiting.   Genitourinary:  Negative for hematuria.   Musculoskeletal:  Negative for falls and myalgias.   Neurological:  Negative for dizziness, loss of consciousness, weakness and headaches.   Psychiatric/Behavioral:  The patient is not nervous/anxious.        Objective:   BP (!) 150/75   Pulse 63   Wt 133.1 kg (293 lb 6.9 oz)   SpO2 97%   BMI 34.80 kg/m²   Physical Exam  Vitals and nursing note reviewed.   Constitutional:       General: He is not in acute distress.     Appearance: Normal appearance. He is not diaphoretic.   HENT:      Head: Normocephalic and atraumatic.      Mouth/Throat:      Mouth: Mucous membranes are moist.   Eyes:      General: No scleral icterus.     Extraocular Movements: Extraocular movements intact.   Neck:      Vascular: No carotid bruit, hepatojugular reflux or JVD.   Cardiovascular:      Rate and Rhythm: Normal rate and regular rhythm.      Pulses: Normal pulses.      Heart sounds: Murmur (MR at apex, TR at L sternal border) heard.      No friction rub.   Pulmonary:      Effort: Pulmonary effort is normal. No respiratory distress.      Breath sounds: Normal breath sounds. No wheezing.   Chest:      Chest wall: No tenderness.   Abdominal:      General: Abdomen is flat. Bowel sounds are normal. There is no distension.      Tenderness: There is no abdominal tenderness.   Musculoskeletal:         General: Swelling (chronic edema R>L) present.   Skin:     General: Skin is warm and dry.      Capillary Refill: Capillary refill takes less than 2 seconds.      Findings: No rash.   Neurological:      General: No focal deficit present.       Mental Status: He is alert and oriented to person, place, and time.   Psychiatric:         Mood and Affect: Mood normal.         Thought Content: Thought content normal.       Labs:     Lab Results   Component Value Date     05/12/2023    K 3.9 05/12/2023     05/12/2023    CO2 24 05/12/2023    BUN 21 05/12/2023    CREATININE 1.4 05/12/2023    ANIONGAP 10 05/12/2023     Lab Results   Component Value Date    HGBA1C 5.6 07/18/2022      Lab Results   Component Value Date    WBC 4.68 05/12/2023    HGB 12.8 (L) 05/12/2023    HCT 39.8 (L) 05/12/2023    HCT 37 09/28/2020     (L) 05/12/2023    GRAN 2.4 05/12/2023    GRAN 51.1 05/12/2023     Lab Results   Component Value Date    CHOL 148 07/18/2022    HDL 51 07/18/2022    LDLCALC 79.0 07/18/2022    TRIG 90 07/18/2022        Cardiovascular Imaging:     Echo 9/13/2020:  The left ventricle is severely enlarged with severely decreased systolic function. The estimated ejection fraction is 18%.  There are segmental left ventricular wall motion abnormalities.  There is moderate left ventricular eccentric hypertrophy.  Grade III diastolic dysfunction.  Severe left atrial enlargement.  Moderate right ventricular enlargement.  Low normal right ventricular systolic function.  Severe right atrial enlargement.  Moderate mitral regurgitation.  Mild to moderate tricuspid regurgitation.  There is pulmonary hypertension at least in the 50s.  [facility] 2/2018: normal coronaries    Assessment & Plan:     1. Essential hypertension    2. NICM (nonischemic cardiomyopathy)    3. Chronic combined systolic and diastolic congestive heart failure    4. Mixed hyperlipidemia    5. Biventricular ICD (implantable cardioverter-defibrillator) in place    6. Severe obesity (BMI 35.0-39.9) with comorbidity    7. AMELIA (obstructive sleep apnea)    8. Peripheral vascular disease, unspecified      #NICM  -Last echo 2020  -repeat echo ordered  -GDMT toprol 50, spironolactone 12.5mg, lasix 80mg.   -start  entresto 24-26 BID and CMP in 1 week. BNP ordered for baseline  -if tolerating entresto (losartan if too expensive, cost was reason he stopped in 2020) then will add jardiance 10mg in 1-2 weeks  -plan to titrate up entresto and spironolactone    Entresto $47, patient requesting losartan instead.    Case discussed with Dr. Valderrama. Will get lab work in a week and decide on adding jardiance. Unless there are intervening problems, patient should return for re-evaluation in 4 weeks.     Signed:  Kayden Ferreira M.D.  Cardiovascular Fellow  [facility]

## 2023-08-22 ENCOUNTER — OFFICE VISIT (OUTPATIENT)
Dept: PODIATRY | Facility: CLINIC | Age: 68
End: 2023-08-22
Payer: MEDICARE

## 2023-08-22 VITALS — BODY MASS INDEX: 34.59 KG/M2 | HEIGHT: 77 IN | WEIGHT: 293 LBS

## 2023-08-22 DIAGNOSIS — I73.9 PERIPHERAL VASCULAR DISEASE, UNSPECIFIED: Primary | ICD-10-CM

## 2023-08-22 DIAGNOSIS — L84 CORN OR CALLUS: ICD-10-CM

## 2023-08-22 DIAGNOSIS — B35.1 ONYCHOMYCOSIS DUE TO DERMATOPHYTE: ICD-10-CM

## 2023-08-22 PROCEDURE — 99499 UNLISTED E&M SERVICE: CPT | Mod: S$GLB,,, | Performed by: PODIATRIST

## 2023-08-22 PROCEDURE — 99499 NO LOS: ICD-10-PCS | Mod: S$GLB,,, | Performed by: PODIATRIST

## 2023-08-22 PROCEDURE — 11721 DEBRIDE NAIL 6 OR MORE: CPT | Mod: 59,Q9,S$GLB, | Performed by: PODIATRIST

## 2023-08-22 PROCEDURE — 11056 PR TRIM BENIGN HYPERKERATOTIC SKIN LESION,2-4: ICD-10-PCS | Mod: Q9,S$GLB,, | Performed by: PODIATRIST

## 2023-08-22 PROCEDURE — 99999 PR PBB SHADOW E&M-EST. PATIENT-LVL III: CPT | Mod: PBBFAC,,, | Performed by: PODIATRIST

## 2023-08-22 PROCEDURE — 11056 PARNG/CUTG B9 HYPRKR LES 2-4: CPT | Mod: Q9,S$GLB,, | Performed by: PODIATRIST

## 2023-08-22 PROCEDURE — 99999 PR PBB SHADOW E&M-EST. PATIENT-LVL III: ICD-10-PCS | Mod: PBBFAC,,, | Performed by: PODIATRIST

## 2023-08-22 PROCEDURE — 11721 PR DEBRIDEMENT OF NAILS, 6 OR MORE: ICD-10-PCS | Mod: 59,Q9,S$GLB, | Performed by: PODIATRIST

## 2023-08-22 NOTE — PROGRESS NOTES
Subjective:     Patient ID: Papito Bhakta is a 68 y.o. male.    Chief Complaint: Nail Care and Peripheral Vascular Disease (6/12/23 Dr To PCP)    Papito is a 68 y.o. male who presents to the clinic for evaluation and treatment of high risk feet. Papito has a past medical history of RIGOBERTO (acute kidney injury) (10/7/2020), Anticoagulant long-term use, CHF (congestive heart failure), Hyperlipidemia, Hypertension, NICM (nonischemic cardiomyopathy) (6/16/2020), Obesity, AMELIA (obstructive sleep apnea) (1/7/2022), and Peripheral vascular disease, unspecified (2/1/2021). The patient's chief complaint is long, thick toenails. This patient has documented high risk feet requiring routine maintenance secondary to peripheral vascular disease.    PCP: Brynn To MD    Date Last Seen by PCP: per above    Current shoe gear:  Affected Foot: Tennis shoes     Unaffected Foot: Tennis shoes    Last encounter in this department: 6/20/2023    Hemoglobin A1C   Date Value Ref Range Status   07/18/2022 5.6 4.0 - 5.6 % Final     Comment:     ADA Screening Guidelines:  5.7-6.4%  Consistent with prediabetes  >or=6.5%  Consistent with diabetes    High levels of fetal hemoglobin interfere with the HbA1C  assay. Heterozygous hemoglobin variants (HbS, HgC, etc)do  not significantly interfere with this assay.   However, presence of multiple variants may affect accuracy.     11/19/2020 6.2 (H) 4.0 - 5.6 % Final     Comment:     ADA Screening Guidelines:  5.7-6.4%  Consistent with prediabetes  >or=6.5%  Consistent with diabetes  High levels of fetal hemoglobin interfere with the HbA1C  assay. Heterozygous hemoglobin variants (HbS, HgC, etc)do  not significantly interfere with this assay.   However, presence of multiple variants may affect accuracy.         Review of Systems   Constitutional: Negative for chills.   Cardiovascular:  Negative for chest pain and claudication.   Respiratory:  Negative for cough.    Skin:  Positive for color change,  dry skin and nail changes.   Musculoskeletal:  Positive for joint pain.   Gastrointestinal:  Negative for nausea.   Neurological:  Positive for paresthesias. Negative for numbness.   Psychiatric/Behavioral:  The patient is not nervous/anxious.         Objective:     Physical Exam  Constitutional:       Appearance: He is well-developed.   Cardiovascular:      Comments: Dorsalis pedis and posterior tibial pulses are diminished Bilaterally. Toes are cool to touch. Feet are warm proximally.There is decreased digital hair. Skin is atrophic, slightly hyperpigmented, and mildly edematous   Pulmonary:      Effort: No respiratory distress.   Musculoskeletal:         General: Tenderness present.      Comments: Adequate joint range of motion without pain, limitation, nor crepitation Bilateral feet and ankle joints. Muscle strength is 5/5 in all groups bilaterally.    Feet:      Right foot:      Skin integrity: Callus and dry skin present. No ulcer or skin breakdown.      Left foot:      Skin integrity: Dry skin present. No ulcer.   Skin:     General: Skin is dry.      Findings: No erythema.      Comments: Nails x10 are elongated by 2-6mm's, thickened by 3-5 mm's, dystrophic, and are darkened in coloration . Xerosis Bilaterally. No open lesions noted     Focal hyperkeratotic lesion consisting entirely of hyperkeratotic tissue without open skin, drainage, pus, fluctuance, malodor, or signs of infection: Right 2nd toe and right plantar forefoot.    Neurological:      Mental Status: He is alert.      Comments: [place] 5.07 monofilamant testing is diminished Wilman feet. Sharp/dull sensation diminished Bilaterally. Light touch absent Bilaterally.            Assessment:      Encounter Diagnoses   Name Primary?    Peripheral vascular disease, unspecified Yes    Onychomycosis due to dermatophyte     Corn or callus      Plan:     Papito was seen today for nail care and peripheral vascular disease.    Diagnoses and all orders for  this visit:    Peripheral vascular disease, unspecified    Onychomycosis due to dermatophyte    Corn or callus      I counseled the patient on his conditions, their implications and medical management.        - Shoe inspection. Diabetic Foot Education. Patient reminded of the importance of good nutrition and blood sugar control to help prevent podiatric complications of diabetes. Patient instructed on proper foot hygeine. We discussed wearing proper shoe gear, daily foot inspections, never walking without protective shoe gear, never putting sharp instruments to feet, routine podiatric nail visits every 2-3 months.   - With patient's permission, nails were aggressively reduced and debrided x 10 to their soft tissue attachment mechanically and with electric , removing all offending nail and debris. Patient relates relief following the procedure. He will continue to monitor the areas daily, inspect his feet, wear protective shoe gear when ambulatory, moisturizer to maintain skin integrity and follow in this office in approximately 2-3 months, sooner p.r.n.   - After cleansing the area w/ alcohol prep pad the above mentioned hyperkeratosis was trimmed utilizing No 15 scapel, to a smooth base with out incident. Patient tolerated this well and reported comfort to the area of right 2nd and right plantar forefoot.

## 2023-08-25 ENCOUNTER — LAB VISIT (OUTPATIENT)
Dept: LAB | Facility: HOSPITAL | Age: 68
End: 2023-08-25
Payer: MEDICARE

## 2023-08-25 ENCOUNTER — TELEPHONE (OUTPATIENT)
Dept: CARDIOLOGY | Facility: HOSPITAL | Age: 68
End: 2023-08-25
Payer: MEDICARE

## 2023-08-25 DIAGNOSIS — I42.8 NICM (NONISCHEMIC CARDIOMYOPATHY): Chronic | ICD-10-CM

## 2023-08-25 DIAGNOSIS — I50.42 CHRONIC COMBINED SYSTOLIC AND DIASTOLIC CONGESTIVE HEART FAILURE: ICD-10-CM

## 2023-08-25 DIAGNOSIS — Z95.810 BIVENTRICULAR ICD (IMPLANTABLE CARDIOVERTER-DEFIBRILLATOR) IN PLACE: ICD-10-CM

## 2023-08-25 LAB
ALBUMIN SERPL BCP-MCNC: 3.3 G/DL (ref 3.5–5.2)
ALP SERPL-CCNC: 67 U/L (ref 55–135)
ALT SERPL W/O P-5'-P-CCNC: 16 U/L (ref 10–44)
ANION GAP SERPL CALC-SCNC: 10 MMOL/L (ref 8–16)
AST SERPL-CCNC: 19 U/L (ref 10–40)
BILIRUB SERPL-MCNC: 1.7 MG/DL (ref 0.1–1)
BNP SERPL-MCNC: 525 PG/ML (ref 0–99)
BUN SERPL-MCNC: 21 MG/DL (ref 8–23)
CALCIUM SERPL-MCNC: 9.2 MG/DL (ref 8.7–10.5)
CHLORIDE SERPL-SCNC: 104 MMOL/L (ref 95–110)
CO2 SERPL-SCNC: 24 MMOL/L (ref 23–29)
CREAT SERPL-MCNC: 1.4 MG/DL (ref 0.5–1.4)
EST. GFR  (NO RACE VARIABLE): 54.7 ML/MIN/1.73 M^2
GLUCOSE SERPL-MCNC: 79 MG/DL (ref 70–110)
POTASSIUM SERPL-SCNC: 4 MMOL/L (ref 3.5–5.1)
PROT SERPL-MCNC: 7.1 G/DL (ref 6–8.4)
SODIUM SERPL-SCNC: 138 MMOL/L (ref 136–145)

## 2023-08-25 PROCEDURE — 36415 COLL VENOUS BLD VENIPUNCTURE: CPT | Mod: PO | Performed by: STUDENT IN AN ORGANIZED HEALTH CARE EDUCATION/TRAINING PROGRAM

## 2023-08-25 PROCEDURE — 83880 ASSAY OF NATRIURETIC PEPTIDE: CPT | Performed by: STUDENT IN AN ORGANIZED HEALTH CARE EDUCATION/TRAINING PROGRAM

## 2023-08-25 PROCEDURE — 80053 COMPREHEN METABOLIC PANEL: CPT | Performed by: STUDENT IN AN ORGANIZED HEALTH CARE EDUCATION/TRAINING PROGRAM

## 2023-08-25 NOTE — TELEPHONE ENCOUNTER
Cardiology Telephone Encounter    Patient seen in clinic last week with NICM and chronic heart failure. Entresto was too expensive and patient started on losartan.     Echo results of LV EF 15-20% with volume overload communicated to patient and consistent with prior echo. Discussed the need to titrate GDMT.    He is getting labs right now with plan to call today and discuss adding jardiance (confirmed insurance coverage). Will continue to titrate up GDMT as tolerated.    Kayden Ferreira MD  Cardiovascular Disease PGY-V  [facility]

## 2023-08-26 ENCOUNTER — TELEPHONE (OUTPATIENT)
Dept: CARDIOLOGY | Facility: HOSPITAL | Age: 68
End: 2023-08-26
Payer: MEDICARE

## 2023-08-26 DIAGNOSIS — I42.8 NICM (NONISCHEMIC CARDIOMYOPATHY): Primary | Chronic | ICD-10-CM

## 2023-08-26 DIAGNOSIS — I50.20 HFREF (HEART FAILURE WITH REDUCED EJECTION FRACTION): ICD-10-CM

## 2023-08-26 NOTE — TELEPHONE ENCOUNTER
Cardiology Telephone Encounter     Patient in need of GDMT titration for HFrEF. Add jardiance 10mg daily. CMP reviewed. Will continue to titrate up GDMT for HFrEF (losartan 25, spironolactone 25, metoprolol 50).       Kayden Ferreira MD  Cardiovascular Disease PGY-V  [facility]

## 2023-09-11 ENCOUNTER — PATIENT MESSAGE (OUTPATIENT)
Dept: CARDIOLOGY | Facility: CLINIC | Age: 68
End: 2023-09-11
Payer: MEDICARE

## 2023-09-11 NOTE — TELEPHONE ENCOUNTER
Spoke w. daughter . Pt w. rash to back and chest just sl improving. No resp/ swelling issue except sl to legs. Told to bring him to ER if it were to happen. Scheduled for 1mth f/u and evaluate med replacement in [place] w. Ms Mclean and she agreed to date/time of appointment(s).

## 2023-09-12 ENCOUNTER — CLINICAL SUPPORT (OUTPATIENT)
Dept: CARDIOLOGY | Facility: HOSPITAL | Age: 68
End: 2023-09-12
Payer: MEDICARE

## 2023-09-12 ENCOUNTER — OFFICE VISIT (OUTPATIENT)
Dept: CARDIOLOGY | Facility: CLINIC | Age: 68
End: 2023-09-12
Payer: MEDICARE

## 2023-09-12 VITALS
WEIGHT: 294.75 LBS | SYSTOLIC BLOOD PRESSURE: 109 MMHG | DIASTOLIC BLOOD PRESSURE: 70 MMHG | HEIGHT: 77 IN | HEART RATE: 60 BPM | BODY MASS INDEX: 34.8 KG/M2

## 2023-09-12 DIAGNOSIS — G47.33 OSA (OBSTRUCTIVE SLEEP APNEA): ICD-10-CM

## 2023-09-12 DIAGNOSIS — L28.2 PRURITIC RASH: ICD-10-CM

## 2023-09-12 DIAGNOSIS — I50.9 HEART FAILURE, UNSPECIFIED: ICD-10-CM

## 2023-09-12 DIAGNOSIS — Z95.810 BIVENTRICULAR ICD (IMPLANTABLE CARDIOVERTER-DEFIBRILLATOR) IN PLACE: ICD-10-CM

## 2023-09-12 DIAGNOSIS — I10 ESSENTIAL HYPERTENSION: Chronic | ICD-10-CM

## 2023-09-12 DIAGNOSIS — Z95.810 PRESENCE OF AUTOMATIC (IMPLANTABLE) CARDIAC DEFIBRILLATOR: ICD-10-CM

## 2023-09-12 DIAGNOSIS — I42.8 NICM (NONISCHEMIC CARDIOMYOPATHY): Primary | Chronic | ICD-10-CM

## 2023-09-12 DIAGNOSIS — I73.9 PERIPHERAL VASCULAR DISEASE, UNSPECIFIED: ICD-10-CM

## 2023-09-12 DIAGNOSIS — E78.2 MIXED HYPERLIPIDEMIA: Chronic | ICD-10-CM

## 2023-09-12 PROCEDURE — 99999 PR PBB SHADOW E&M-EST. PATIENT-LVL III: CPT | Mod: PBBFAC,,, | Performed by: PHYSICIAN ASSISTANT

## 2023-09-12 PROCEDURE — 3078F PR MOST RECENT DIASTOLIC BLOOD PRESSURE < 80 MM HG: ICD-10-PCS | Mod: CPTII,S$GLB,, | Performed by: PHYSICIAN ASSISTANT

## 2023-09-12 PROCEDURE — 93296 REM INTERROG EVL PM/IDS: CPT | Performed by: INTERNAL MEDICINE

## 2023-09-12 PROCEDURE — 1125F AMNT PAIN NOTED PAIN PRSNT: CPT | Mod: CPTII,S$GLB,, | Performed by: PHYSICIAN ASSISTANT

## 2023-09-12 PROCEDURE — 1159F MED LIST DOCD IN RCRD: CPT | Mod: CPTII,S$GLB,, | Performed by: PHYSICIAN ASSISTANT

## 2023-09-12 PROCEDURE — 3288F PR FALLS RISK ASSESSMENT DOCUMENTED: ICD-10-PCS | Mod: CPTII,S$GLB,, | Performed by: PHYSICIAN ASSISTANT

## 2023-09-12 PROCEDURE — 4010F PR ACE/ARB THEARPY RXD/TAKEN: ICD-10-PCS | Mod: CPTII,S$GLB,, | Performed by: PHYSICIAN ASSISTANT

## 2023-09-12 PROCEDURE — 3074F SYST BP LT 130 MM HG: CPT | Mod: CPTII,S$GLB,, | Performed by: PHYSICIAN ASSISTANT

## 2023-09-12 PROCEDURE — 1101F PT FALLS ASSESS-DOCD LE1/YR: CPT | Mod: CPTII,S$GLB,, | Performed by: PHYSICIAN ASSISTANT

## 2023-09-12 PROCEDURE — 3074F PR MOST RECENT SYSTOLIC BLOOD PRESSURE < 130 MM HG: ICD-10-PCS | Mod: CPTII,S$GLB,, | Performed by: PHYSICIAN ASSISTANT

## 2023-09-12 PROCEDURE — 99999 PR PBB SHADOW E&M-EST. PATIENT-LVL III: ICD-10-PCS | Mod: PBBFAC,,, | Performed by: PHYSICIAN ASSISTANT

## 2023-09-12 PROCEDURE — 3008F BODY MASS INDEX DOCD: CPT | Mod: CPTII,S$GLB,, | Performed by: PHYSICIAN ASSISTANT

## 2023-09-12 PROCEDURE — 3288F FALL RISK ASSESSMENT DOCD: CPT | Mod: CPTII,S$GLB,, | Performed by: PHYSICIAN ASSISTANT

## 2023-09-12 PROCEDURE — 1101F PR PT FALLS ASSESS DOC 0-1 FALLS W/OUT INJ PAST YR: ICD-10-PCS | Mod: CPTII,S$GLB,, | Performed by: PHYSICIAN ASSISTANT

## 2023-09-12 PROCEDURE — 1125F PR PAIN SEVERITY QUANTIFIED, PAIN PRESENT: ICD-10-PCS | Mod: CPTII,S$GLB,, | Performed by: PHYSICIAN ASSISTANT

## 2023-09-12 PROCEDURE — 99214 PR OFFICE/OUTPT VISIT, EST, LEVL IV, 30-39 MIN: ICD-10-PCS | Mod: S$GLB,,, | Performed by: PHYSICIAN ASSISTANT

## 2023-09-12 PROCEDURE — 1159F PR MEDICATION LIST DOCUMENTED IN MEDICAL RECORD: ICD-10-PCS | Mod: CPTII,S$GLB,, | Performed by: PHYSICIAN ASSISTANT

## 2023-09-12 PROCEDURE — 3078F DIAST BP <80 MM HG: CPT | Mod: CPTII,S$GLB,, | Performed by: PHYSICIAN ASSISTANT

## 2023-09-12 PROCEDURE — 99214 OFFICE O/P EST MOD 30 MIN: CPT | Mod: S$GLB,,, | Performed by: PHYSICIAN ASSISTANT

## 2023-09-12 PROCEDURE — 4010F ACE/ARB THERAPY RXD/TAKEN: CPT | Mod: CPTII,S$GLB,, | Performed by: PHYSICIAN ASSISTANT

## 2023-09-12 PROCEDURE — 3008F PR BODY MASS INDEX (BMI) DOCUMENTED: ICD-10-PCS | Mod: CPTII,S$GLB,, | Performed by: PHYSICIAN ASSISTANT

## 2023-09-12 RX ORDER — HYDROCORTISONE 1 %
CREAM (GRAM) TOPICAL 2 TIMES DAILY
Qty: 30 G | Refills: 0 | Status: SHIPPED | OUTPATIENT
Start: 2023-09-12 | End: 2023-12-06

## 2023-09-12 RX ORDER — HYDROCORTISONE 1 %
CREAM (GRAM) TOPICAL 2 TIMES DAILY
Qty: 30 G | Refills: 0 | Status: SHIPPED | OUTPATIENT
Start: 2023-09-12 | End: 2023-09-12 | Stop reason: SDUPTHER

## 2023-09-12 NOTE — PROGRESS NOTES
Cardiology Clinic Note  Reason for Visit: NICM, rash related to losartan   LOV w/ Dr. Ferreira: 8/18/2023    HPI:     PMHx:  HTN  HLD  NICM s/p CRT-D  - 2/13/2019, extracted due to infection 6/17/2020, reimplanted right side 9/28/2020      Papito Bhakta is a 68 y.o. male, who presents with complaint of rash affecting his trunk and legs since beginning losartan 8/18/2023. He stopped taking losartan around 9/4. He doesn't feel that the rash is improving just yet. He has not tried any OTC medications or topicals for symptomatic relief. Entresto has been too expensive in the past. He previously took ramipril for several years beginning in 2019, but states that it caused gynecomastia and worsened leg swelling. He did get jardiance and has been taking for the past 2-3 weeks. He does not monitor his BP at home. EF on 8/18 TTE is 15-20%.       ROS:    Pertinent ROS included in HPI and below.  PMH:     Past Medical History:   Diagnosis Date    RIGOBERTO (acute kidney injury) 10/7/2020    Anticoagulant long-term use     Aspirin    CHF (congestive heart failure)     Hyperlipidemia     Hypertension     NICM (nonischemic cardiomyopathy) 6/16/2020    Obesity     AMELIA (obstructive sleep apnea) 1/7/2022    Peripheral vascular disease, unspecified 2/1/2021     Past Surgical History:   Procedure Laterality Date    INSERTION OF BIVENTRICULAR IMPLANTABLE CARDIOVERTER-DEFIBRILLATOR (ICD) N/A 2/13/2019    Procedure: INSERTION, ICD, BIVENTRICULAR;  Surgeon: Shailesh Eng MD;  Location: [facility] CATH LAB;  Service: Cardiology;  Laterality: N/A;  RN PRE OP 2-6-19  1ST CASE PER  [facility]. NOTIFIED [facility] THAT ANESTHESIA IS NOT PITO FOR 1ST CASE START-LO    INSERTION OF BIVENTRICULAR IMPLANTABLE CARDIOVERTER-DEFIBRILLATOR (ICD) N/A 9/28/2020    Procedure: INSERTION, ICD, BIVENTRICULAR;  Surgeon: Jim Kwong MD;  Location: [facility] EP LAB;  Service: Cardiology;  Laterality: N/A;  NICM, CRT-D, SJM,, MAC, DM, 3 Prep*Wearing LifeVest*    oral  extraction  11/2018    TESTICLE SURGERY       Allergies:     Review of patient's allergies indicates:   Allergen Reactions    Losartan Rash     Medications:     Current Outpatient Medications on File Prior to Visit   Medication Sig Dispense Refill    acetaminophen (TYLENOL) 500 MG tablet Take 2 tablets (1,000 mg total) by mouth every 8 (eight) hours as needed for Pain or Temperature greater than (100.5). 30 tablet 0    amiodarone (PACERONE) 200 MG Tab Take 1 tablet (200 mg total) by mouth once daily. 90 tablet 3    aspirin (ECOTRIN) 325 MG EC tablet Take 1 tablet (325 mg total) by mouth once daily. 90 tablet 3    empagliflozin (JARDIANCE) 10 mg tablet Take 1 tablet (10 mg total) by mouth once daily. 90 tablet 3    furosemide (LASIX) 80 MG tablet TAKE 1 TABLET EVERY DAY 90 tablet 1    gabapentin (NEURONTIN) 100 MG capsule Take 1 capsule (100 mg total) by mouth 3 (three) times daily. Take 100 mg by mouth 3 (three) times daily. 270 capsule 1    meloxicam (MOBIC) 15 MG tablet Take 1 tablet (15 mg total) by mouth daily as needed for Pain. 30 tablet 0    metoprolol succinate (TOPROL-XL) 50 MG 24 hr tablet TAKE 1 TABLET EVERY DAY 90 tablet 3    potassium chloride (K-TAB) 20 mEq TAKE 1 TABLET EVERY DAY 90 tablet 1    pravastatin (PRAVACHOL) 80 MG tablet TAKE 1 TABLET EVERY DAY 90 tablet 1    spironolactone (ALDACTONE) 25 MG tablet TAKE 1/2 TABLET (12.5 MG TOTAL) ONCE DAILY. HOLD IF SYSTOLIC BLOOD PRESSURE IS LESS THAN 110 45 tablet 5    methocarbamoL (ROBAXIN) 500 MG Tab Take 2 tablets (1,000 mg total) by mouth 3 (three) times daily as needed (Pain not improved by other medications). 30 tablet 0    [DISCONTINUED] losartan (COZAAR) 25 MG tablet Take 1 tablet (25 mg total) by mouth once daily. (Patient not taking: Reported on 9/12/2023) 90 tablet 3    [DISCONTINUED] ramipril (ALTACE) 10 MG capsule Take 1 capsule (10 mg total) by mouth once daily. 90 capsule 3     No current facility-administered medications on file prior to  "visit.     Social History:     Social History     Tobacco Use    Smoking status: Former     Current packs/day: 0.00     Average packs/day: 0.5 packs/day for 40.0 years (20.0 ttl pk-yrs)     Types: Cigarettes, Cigars     Start date:      Quit date: 2014     Years since quittin.7    Smokeless tobacco: Never   Substance Use Topics    Alcohol use: Yes     Comment: once a month     Family History:     Family History   Problem Relation Age of Onset    Epilepsy Mother      Physical Exam:   /70   Pulse 60   Ht 6' 5" (1.956 m)   Wt 133.7 kg (294 lb 12.1 oz)   BMI 34.95 kg/m²      Physical Exam  Vitals and nursing note reviewed.   Constitutional:       Appearance: Normal appearance.   HENT:      Head: Normocephalic and atraumatic.   Neck:      Vascular: No carotid bruit or hepatojugular reflux.   Cardiovascular:      Rate and Rhythm: Normal rate and regular rhythm.      Pulses:           Carotid pulses are 2+ on the right side and 2+ on the left side.       Radial pulses are 2+ on the right side and 2+ on the left side.      Heart sounds: S1 normal and S2 normal. Murmur heard.      Systolic murmur is present with a grade of 2/6.   Pulmonary:      Effort: Pulmonary effort is normal.      Breath sounds: Normal breath sounds.   Abdominal:      General: Bowel sounds are normal.      Palpations: Abdomen is soft.      Tenderness: There is no abdominal tenderness.   Musculoskeletal:      Right lower leg: 3+ Edema present.      Left lower leg: 3+ Edema present.   Feet:      Right foot:      Skin integrity: Skin integrity normal.      Left foot:      Skin integrity: Skin integrity normal.   Neurological:      Mental Status: He is alert.   Psychiatric:         Behavior: Behavior is cooperative.          Labs:     Blood Tests:  Lab Results   Component Value Date     (H) 2023     2023    K 4.0 2023     2023    CO2 24 2023    BUN 21 2023    CREATININE 1.4 2023 "    GLU 79 2023    HGBA1C 5.6 2022    MG 1.7 2020    AST 19 2023    ALT 16 2023    ALBUMIN 3.3 (L) 2023    PROT 7.1 2023    BILITOT 1.7 (H) 2023    WBC 4.68 2023    HGB 12.8 (L) 2023    HCT 39.8 (L) 2023    HCT 37 2020    MCV 78 (L) 2023     (L) 2023    INR 1.0 2021    TSH 1.729 2022       Lab Results   Component Value Date    CHOL 148 2022    HDL 51 2022    TRIG 90 2022       Lab Results   Component Value Date    LDLCALC 79.0 2022         Imaging:     Echocardiogram  TTE 2023    Left Ventricle: The left ventricle is severely dilated. Increased ventricular mass. Normal wall thickness. Global hypokinesis present. There is severely reduced systolic function with a visually estimated ejection fraction of 15 - 20%. There is diastolic dysfunction.    Left Atrium: Left atrium is severely dilated.    Right Ventricle: Severe right ventricular enlargement. Wall thickness is normal. Systolic function is moderately reduced. Pacemaker lead present in the ventricle.    Right Atrium: Right atrium is dilated.    Mitral Valve: Mild mitral annular calcification. There is moderate regurgitation.    Tricuspid Valve: There is mild regurgitation.    Pulmonic Valve: There is mild regurgitation.    Pulmonary Artery: The estimated pulmonary artery systolic pressure is 59 mmHg.    IVC/SVC: Elevated venous pressure at 15 mmHg.    Cath Procedure  [facility] 2018    Normal coronary arteries.     Systolic dysfunction.     Low right and left Filling Pressures.     Mild Pulmonary Hypertension.     [id number]  Atrial-sensed ventricular-paced rhythm   Biventricular pacemaker detected   When compared with ECG of 02-AUG-2023 08:50,   Premature ventricular complexes are no longer Present     Assessment:     1. NICM (nonischemic cardiomyopathy)    2. Biventricular ICD (implantable cardioverter-defibrillator) in place     3. Pruritic rash    4. Essential hypertension    5. Mixed hyperlipidemia    6. Peripheral vascular disease, unspecified    7. AMELIA (obstructive sleep apnea)    8. BMI 34.0-34.9,adult        Plan:     NICM (nonischemic cardiomyopathy)    Biventricular ICD (implantable cardioverter-defibrillator) in place    Pruritic rash  -     hydrocortisone 1 % cream; Apply topically 2 (two) times daily.  Dispense: 30 g; Refill: 0    Essential hypertension    Mixed hyperlipidemia    Peripheral vascular disease, unspecified    AMELIA (obstructive sleep apnea)    BMI 34.0-34.9,adult    Continue jardiance 10 mg, furosemide 80 mg qD (with an extra half tablet as needed for leg swelling), metoprolol succinate 50 mg qD and spironolactone 12.6 mg qD. Intolerant to losartan and ramipril. Entresto has been to expensive. BP too low in clinic to try an additional ACE or ARB right now. Recommend recording BP at home and quick follow up to discuss possible alternative. Consider bidil.     Signed:  Ashley Mclean PA-C  Cardiology     9/13/2023 3:20 PM    Follow-up:     Future Appointments   Date Time Provider Department Center   9/14/2023  3:30 PM Ozzy Martines NP [facility] -    10/24/2023  9:00 AM Ashley Mclean PA-C [facility] CARDIO [place]   11/21/2023 10:15 AM Stefanie Torrez DPM [facility] POD [place]   12/11/2023 10:00 AM HOME MONITOR DEVICE CHECK, EMILIA [place]

## 2023-09-12 NOTE — PATIENT INSTRUCTIONS
It's ok to stay off the losartan     You can continue taking a full tablet of spironolactone every other day    Please check your blood pressure daily and record the numbers. Please bring those numbers to your follow up appointment.     Please bring you blood pressure cuff to your next appontment

## 2023-09-14 ENCOUNTER — LAB VISIT (OUTPATIENT)
Dept: LAB | Facility: HOSPITAL | Age: 68
End: 2023-09-14
Attending: NURSE PRACTITIONER
Payer: MEDICARE

## 2023-09-14 ENCOUNTER — OFFICE VISIT (OUTPATIENT)
Dept: FAMILY MEDICINE | Facility: CLINIC | Age: 68
End: 2023-09-14
Payer: MEDICARE

## 2023-09-14 VITALS
RESPIRATION RATE: 16 BRPM | HEART RATE: 60 BPM | SYSTOLIC BLOOD PRESSURE: 108 MMHG | TEMPERATURE: 98 F | DIASTOLIC BLOOD PRESSURE: 68 MMHG | HEIGHT: 77 IN | BODY MASS INDEX: 35.04 KG/M2 | OXYGEN SATURATION: 98 % | WEIGHT: 296.75 LBS

## 2023-09-14 DIAGNOSIS — E78.2 MIXED HYPERLIPIDEMIA: Chronic | ICD-10-CM

## 2023-09-14 DIAGNOSIS — R73.03 PREDIABETES: ICD-10-CM

## 2023-09-14 DIAGNOSIS — E04.1 THYROID NODULE: ICD-10-CM

## 2023-09-14 DIAGNOSIS — R93.89 ABNORMAL CT OF THE CHEST: ICD-10-CM

## 2023-09-14 DIAGNOSIS — I42.8 NICM (NONISCHEMIC CARDIOMYOPATHY): Primary | Chronic | ICD-10-CM

## 2023-09-14 DIAGNOSIS — I10 ESSENTIAL HYPERTENSION: Chronic | ICD-10-CM

## 2023-09-14 PROCEDURE — 1125F PR PAIN SEVERITY QUANTIFIED, PAIN PRESENT: ICD-10-PCS | Mod: CPTII,S$GLB,, | Performed by: NURSE PRACTITIONER

## 2023-09-14 PROCEDURE — 1159F PR MEDICATION LIST DOCUMENTED IN MEDICAL RECORD: ICD-10-PCS | Mod: CPTII,S$GLB,, | Performed by: NURSE PRACTITIONER

## 2023-09-14 PROCEDURE — 4010F ACE/ARB THERAPY RXD/TAKEN: CPT | Mod: CPTII,S$GLB,, | Performed by: NURSE PRACTITIONER

## 2023-09-14 PROCEDURE — 99999 PR PBB SHADOW E&M-EST. PATIENT-LVL V: CPT | Mod: PBBFAC,,, | Performed by: NURSE PRACTITIONER

## 2023-09-14 PROCEDURE — 3008F PR BODY MASS INDEX (BMI) DOCUMENTED: ICD-10-PCS | Mod: CPTII,S$GLB,, | Performed by: NURSE PRACTITIONER

## 2023-09-14 PROCEDURE — 3074F PR MOST RECENT SYSTOLIC BLOOD PRESSURE < 130 MM HG: ICD-10-PCS | Mod: CPTII,S$GLB,, | Performed by: NURSE PRACTITIONER

## 2023-09-14 PROCEDURE — 1125F AMNT PAIN NOTED PAIN PRSNT: CPT | Mod: CPTII,S$GLB,, | Performed by: NURSE PRACTITIONER

## 2023-09-14 PROCEDURE — 99214 OFFICE O/P EST MOD 30 MIN: CPT | Mod: S$GLB,,, | Performed by: NURSE PRACTITIONER

## 2023-09-14 PROCEDURE — 1101F PR PT FALLS ASSESS DOC 0-1 FALLS W/OUT INJ PAST YR: ICD-10-PCS | Mod: CPTII,S$GLB,, | Performed by: NURSE PRACTITIONER

## 2023-09-14 PROCEDURE — 3288F FALL RISK ASSESSMENT DOCD: CPT | Mod: CPTII,S$GLB,, | Performed by: NURSE PRACTITIONER

## 2023-09-14 PROCEDURE — 3078F DIAST BP <80 MM HG: CPT | Mod: CPTII,S$GLB,, | Performed by: NURSE PRACTITIONER

## 2023-09-14 PROCEDURE — 1159F MED LIST DOCD IN RCRD: CPT | Mod: CPTII,S$GLB,, | Performed by: NURSE PRACTITIONER

## 2023-09-14 PROCEDURE — 4010F PR ACE/ARB THEARPY RXD/TAKEN: ICD-10-PCS | Mod: CPTII,S$GLB,, | Performed by: NURSE PRACTITIONER

## 2023-09-14 PROCEDURE — 1160F PR REVIEW ALL MEDS BY PRESCRIBER/CLIN PHARMACIST DOCUMENTED: ICD-10-PCS | Mod: CPTII,S$GLB,, | Performed by: NURSE PRACTITIONER

## 2023-09-14 PROCEDURE — 80061 LIPID PANEL: CPT | Performed by: NURSE PRACTITIONER

## 2023-09-14 PROCEDURE — 1101F PT FALLS ASSESS-DOCD LE1/YR: CPT | Mod: CPTII,S$GLB,, | Performed by: NURSE PRACTITIONER

## 2023-09-14 PROCEDURE — 99214 PR OFFICE/OUTPT VISIT, EST, LEVL IV, 30-39 MIN: ICD-10-PCS | Mod: S$GLB,,, | Performed by: NURSE PRACTITIONER

## 2023-09-14 PROCEDURE — 36415 COLL VENOUS BLD VENIPUNCTURE: CPT | Mod: PO | Performed by: NURSE PRACTITIONER

## 2023-09-14 PROCEDURE — 3074F SYST BP LT 130 MM HG: CPT | Mod: CPTII,S$GLB,, | Performed by: NURSE PRACTITIONER

## 2023-09-14 PROCEDURE — 99999 PR PBB SHADOW E&M-EST. PATIENT-LVL V: ICD-10-PCS | Mod: PBBFAC,,, | Performed by: NURSE PRACTITIONER

## 2023-09-14 PROCEDURE — 3288F PR FALLS RISK ASSESSMENT DOCUMENTED: ICD-10-PCS | Mod: CPTII,S$GLB,, | Performed by: NURSE PRACTITIONER

## 2023-09-14 PROCEDURE — 3008F BODY MASS INDEX DOCD: CPT | Mod: CPTII,S$GLB,, | Performed by: NURSE PRACTITIONER

## 2023-09-14 PROCEDURE — 83036 HEMOGLOBIN GLYCOSYLATED A1C: CPT | Performed by: NURSE PRACTITIONER

## 2023-09-14 PROCEDURE — 1160F RVW MEDS BY RX/DR IN RCRD: CPT | Mod: CPTII,S$GLB,, | Performed by: NURSE PRACTITIONER

## 2023-09-14 PROCEDURE — 3078F PR MOST RECENT DIASTOLIC BLOOD PRESSURE < 80 MM HG: ICD-10-PCS | Mod: CPTII,S$GLB,, | Performed by: NURSE PRACTITIONER

## 2023-09-14 PROCEDURE — 84443 ASSAY THYROID STIM HORMONE: CPT | Performed by: NURSE PRACTITIONER

## 2023-09-14 NOTE — PROGRESS NOTES
Routine Office Visit    Patient Name: Papito Bhakta    : 1955  MRN: [id number]    Chief Complaint:  Follow-up hypertension, hyperlipidemia, cardiomyopathy    Subjective:  Papito is a 68 y.o. male who presents today for:    1. Follow-up hypertension, hyperlipidemia, cardiomyopathy - patient who is known to me with the below documented medical history reports today for follow-up of chronic conditions.  He states he had to miss his PCP appointment yesterday.  Overall he feels well in stable condition.  He does have some chronic exertional shortness of breath but this has not worsened as of late.  He deals with chronic leg swelling as well which is unchanged.  He is compliant with his current medications.  States that he had a rash due to losartan recently which has been improving with use of hydrocortisone cream.    He does have a history of abnormal chest CT with thyroid nodules.  He is due for follow-up with pulmonology.    Past Medical History  Past Medical History:   Diagnosis Date    RIGOBERTO (acute kidney injury) 10/7/2020    Anticoagulant long-term use     Aspirin    CHF (congestive heart failure)     Hyperlipidemia     Hypertension     NICM (nonischemic cardiomyopathy) 2020    Obesity     AMELIA (obstructive sleep apnea) 2022    Peripheral vascular disease, unspecified 2021       Past Surgical History  Past Surgical History:   Procedure Laterality Date    INSERTION OF BIVENTRICULAR IMPLANTABLE CARDIOVERTER-DEFIBRILLATOR (ICD) N/A 2019    Procedure: INSERTION, ICD, BIVENTRICULAR;  Surgeon: Shailesh Eng MD;  Location: [facility] CATH LAB;  Service: Cardiology;  Laterality: N/A;  RN PRE OP 2-19  1ST CASE PER  [facility]. NOTIFIED [facility] THAT ANESTHESIA IS NOT PITO FOR 1ST CASE START-LO    INSERTION OF BIVENTRICULAR IMPLANTABLE CARDIOVERTER-DEFIBRILLATOR (ICD) N/A 2020    Procedure: INSERTION, ICD, BIVENTRICULAR;  Surgeon: Jim Kwong MD;  Location: [facility] EP LAB;  Service: Cardiology;   Laterality: N/A;  NICM, CRT-D, SJM,, MAC, DM, 3 Prep*Wearing LifeVest*    oral extraction  2018    TESTICLE SURGERY         Family History  Family History   Problem Relation Age of Onset    Epilepsy Mother        Social History  Social History     Socioeconomic History    Marital status:    Tobacco Use    Smoking status: Former     Current packs/day: 0.00     Average packs/day: 0.5 packs/day for 40.0 years (20.0 ttl pk-yrs)     Types: Cigarettes, Cigars     Start date:      Quit date:      Years since quittin.7    Smokeless tobacco: Never   Substance and Sexual Activity    Alcohol use: Yes     Comment: once a month    Drug use: No    Sexual activity: Yes     Birth control/protection: None     Comment: uses protection sometimes       Current Medications  Current Outpatient Medications on File Prior to Visit   Medication Sig Dispense Refill    acetaminophen (TYLENOL) 500 MG tablet Take 2 tablets (1,000 mg total) by mouth every 8 (eight) hours as needed for Pain or Temperature greater than (100.5). 30 tablet 0    amiodarone (PACERONE) 200 MG Tab Take 1 tablet (200 mg total) by mouth once daily. 90 tablet 3    aspirin (ECOTRIN) 325 MG EC tablet Take 1 tablet (325 mg total) by mouth once daily. 90 tablet 3    empagliflozin (JARDIANCE) 10 mg tablet Take 1 tablet (10 mg total) by mouth once daily. 90 tablet 3    furosemide (LASIX) 80 MG tablet TAKE 1 TABLET EVERY DAY 90 tablet 1    hydrocortisone 1 % cream Apply topically 2 (two) times daily. 30 g 0    meloxicam (MOBIC) 15 MG tablet Take 1 tablet (15 mg total) by mouth daily as needed for Pain. 30 tablet 0    methocarbamoL (ROBAXIN) 500 MG Tab Take 2 tablets (1,000 mg total) by mouth 3 (three) times daily as needed (Pain not improved by other medications). 30 tablet 0    metoprolol succinate (TOPROL-XL) 50 MG 24 hr tablet TAKE 1 TABLET EVERY DAY 90 tablet 3    potassium chloride (K-TAB) 20 mEq TAKE 1 TABLET EVERY DAY 90 tablet 1    pravastatin  "(PRAVACHOL) 80 MG tablet TAKE 1 TABLET EVERY DAY 90 tablet 1    spironolactone (ALDACTONE) 25 MG tablet TAKE 1/2 TABLET (12.5 MG TOTAL) ONCE DAILY. HOLD IF SYSTOLIC BLOOD PRESSURE IS LESS THAN 110 45 tablet 5    gabapentin (NEURONTIN) 100 MG capsule Take 1 capsule (100 mg total) by mouth 3 (three) times daily. Take 100 mg by mouth 3 (three) times daily. 270 capsule 1    [DISCONTINUED] ramipril (ALTACE) 10 MG capsule Take 1 capsule (10 mg total) by mouth once daily. 90 capsule 3     No current facility-administered medications on file prior to visit.       Allergies   Review of patient's allergies indicates:   Allergen Reactions    Losartan Rash       Review of Systems (Pertinent positives)  Review of Systems   Constitutional: Negative.  Negative for chills and fever.   HENT: Negative.  Negative for congestion, sinus pain and sore throat.    Eyes: Negative.    Respiratory:  Negative for cough, hemoptysis, sputum production and wheezing.    Cardiovascular:  Negative for chest pain, palpitations, orthopnea and claudication.   Gastrointestinal: Negative.  Negative for abdominal pain, diarrhea, nausea and vomiting.   Genitourinary: Negative.  Negative for dysuria, frequency and urgency.   Musculoskeletal: Negative.  Negative for back pain, joint pain and neck pain.   Skin: Negative.    Neurological: Negative.  Negative for dizziness, tingling, loss of consciousness and headaches.   Endo/Heme/Allergies: Negative.    Psychiatric/Behavioral: Negative.         /68 (BP Location: Right arm, Patient Position: Sitting, BP Method: X-Large (Manual))   Pulse 60   Temp 97.6 °F (36.4 °C) (Oral)   Ht 6' 5" (1.956 m)   Wt 134.6 kg (296 lb 11.8 oz)   SpO2 98%   BMI 35.19 kg/m²     Physical Exam  Vitals reviewed.   Constitutional:       General: He is not in acute distress.     Appearance: Normal appearance. He is not ill-appearing, toxic-appearing or diaphoretic.   HENT:      Head: Normocephalic and atraumatic. "   Cardiovascular:      Rate and Rhythm: Normal rate and regular rhythm.      Pulses: Normal pulses.      Heart sounds: Normal heart sounds.   Pulmonary:      Effort: Pulmonary effort is normal. No respiratory distress.      Breath sounds: Normal breath sounds. No wheezing.   Abdominal:      General: Bowel sounds are normal. There is no distension.      Palpations: Abdomen is soft.      Tenderness: There is no abdominal tenderness.   Musculoskeletal:         General: No swelling, tenderness or deformity. Normal range of motion.   Skin:     General: Skin is warm and dry.      Capillary Refill: Capillary refill takes less than 2 seconds.   Neurological:      General: No focal deficit present.      Mental Status: He is alert and oriented to person, place, and time.   Psychiatric:         Mood and Affect: Mood normal.         Behavior: Behavior normal.          Assessment/Plan:  Papito Bhakta is a 68 y.o. male who presents today for :    Papito was seen today for follow-up.    Diagnoses and all orders for this visit:    NICM (nonischemic cardiomyopathy)    Essential hypertension  -     Lipid Panel; Future    Prediabetes  -     HEMOGLOBIN A1C; Future    Mixed hyperlipidemia  -     Lipid Panel; Future    Abnormal CT of the chest  -     Ambulatory referral/consult to Pulmonology; Future    Thyroid nodule  -     TSH; Future  -     US Soft Tissue Head Neck Thyroid; Future    - HTN and cardiomyopathy stable  - recommend pulm follow up  - check labs with thyroid US  - continue current meds - pt states he does not need any refills  - f/u 3-6 months        This office note has been dictated.  This dictation has been generated using M-Modal Fluency Direct dictation; some phonetic errors may occur.

## 2023-09-15 LAB
CHOLEST SERPL-MCNC: 103 MG/DL (ref 120–199)
CHOLEST/HDLC SERPL: 2.9 {RATIO} (ref 2–5)
ESTIMATED AVG GLUCOSE: 120 MG/DL (ref 68–131)
HBA1C MFR BLD: 5.8 % (ref 4–5.6)
HDLC SERPL-MCNC: 35 MG/DL (ref 40–75)
HDLC SERPL: 34 % (ref 20–50)
LDLC SERPL CALC-MCNC: 57.8 MG/DL (ref 63–159)
NONHDLC SERPL-MCNC: 68 MG/DL
TRIGL SERPL-MCNC: 51 MG/DL (ref 30–150)
TSH SERPL DL<=0.005 MIU/L-ACNC: 2.76 UIU/ML (ref 0.4–4)

## 2023-10-05 ENCOUNTER — HOSPITAL ENCOUNTER (OUTPATIENT)
Dept: RADIOLOGY | Facility: HOSPITAL | Age: 68
Discharge: HOME | End: 2023-10-05
Attending: NURSE PRACTITIONER
Payer: MEDICARE

## 2023-10-05 DIAGNOSIS — E04.1 THYROID NODULE: ICD-10-CM

## 2023-10-05 PROCEDURE — 76536 US EXAM OF HEAD AND NECK: CPT | Mod: 26,,, | Performed by: RADIOLOGY

## 2023-10-05 PROCEDURE — 76536 US EXAM OF HEAD AND NECK: CPT | Mod: TC

## 2023-10-05 PROCEDURE — 76536 US SOFT TISSUE HEAD NECK THYROID: ICD-10-PCS | Mod: 26,,, | Performed by: RADIOLOGY

## 2023-10-24 ENCOUNTER — OFFICE VISIT (OUTPATIENT)
Dept: CARDIOLOGY | Facility: CLINIC | Age: 68
End: 2023-10-24
Payer: MEDICARE

## 2023-10-24 VITALS
DIASTOLIC BLOOD PRESSURE: 66 MMHG | SYSTOLIC BLOOD PRESSURE: 102 MMHG | WEIGHT: 284.38 LBS | HEIGHT: 77 IN | BODY MASS INDEX: 33.58 KG/M2 | HEART RATE: 60 BPM

## 2023-10-24 DIAGNOSIS — I42.8 NICM (NONISCHEMIC CARDIOMYOPATHY): Chronic | ICD-10-CM

## 2023-10-24 DIAGNOSIS — E78.2 MIXED HYPERLIPIDEMIA: Chronic | ICD-10-CM

## 2023-10-24 DIAGNOSIS — Z95.810 BIVENTRICULAR ICD (IMPLANTABLE CARDIOVERTER-DEFIBRILLATOR) IN PLACE: ICD-10-CM

## 2023-10-24 DIAGNOSIS — I47.20 VENTRICULAR TACHYCARDIA: ICD-10-CM

## 2023-10-24 DIAGNOSIS — I73.9 PERIPHERAL VASCULAR DISEASE, UNSPECIFIED: ICD-10-CM

## 2023-10-24 DIAGNOSIS — G47.33 OSA (OBSTRUCTIVE SLEEP APNEA): ICD-10-CM

## 2023-10-24 DIAGNOSIS — I10 ESSENTIAL HYPERTENSION: Chronic | ICD-10-CM

## 2023-10-24 DIAGNOSIS — I50.42 CHRONIC COMBINED SYSTOLIC AND DIASTOLIC CONGESTIVE HEART FAILURE: Primary | ICD-10-CM

## 2023-10-24 PROCEDURE — 4010F PR ACE/ARB THEARPY RXD/TAKEN: ICD-10-PCS | Mod: CPTII,S$GLB,, | Performed by: PHYSICIAN ASSISTANT

## 2023-10-24 PROCEDURE — 1101F PR PT FALLS ASSESS DOC 0-1 FALLS W/OUT INJ PAST YR: ICD-10-PCS | Mod: CPTII,S$GLB,, | Performed by: PHYSICIAN ASSISTANT

## 2023-10-24 PROCEDURE — 3078F PR MOST RECENT DIASTOLIC BLOOD PRESSURE < 80 MM HG: ICD-10-PCS | Mod: CPTII,S$GLB,, | Performed by: PHYSICIAN ASSISTANT

## 2023-10-24 PROCEDURE — 99214 OFFICE O/P EST MOD 30 MIN: CPT | Mod: S$GLB,,, | Performed by: PHYSICIAN ASSISTANT

## 2023-10-24 PROCEDURE — 3008F PR BODY MASS INDEX (BMI) DOCUMENTED: ICD-10-PCS | Mod: CPTII,S$GLB,, | Performed by: PHYSICIAN ASSISTANT

## 2023-10-24 PROCEDURE — 3288F PR FALLS RISK ASSESSMENT DOCUMENTED: ICD-10-PCS | Mod: CPTII,S$GLB,, | Performed by: PHYSICIAN ASSISTANT

## 2023-10-24 PROCEDURE — 1126F PR PAIN SEVERITY QUANTIFIED, NO PAIN PRESENT: ICD-10-PCS | Mod: CPTII,S$GLB,, | Performed by: PHYSICIAN ASSISTANT

## 2023-10-24 PROCEDURE — 3044F PR MOST RECENT HEMOGLOBIN A1C LEVEL <7.0%: ICD-10-PCS | Mod: CPTII,S$GLB,, | Performed by: PHYSICIAN ASSISTANT

## 2023-10-24 PROCEDURE — 99999 PR PBB SHADOW E&M-EST. PATIENT-LVL IV: CPT | Mod: PBBFAC,,, | Performed by: PHYSICIAN ASSISTANT

## 2023-10-24 PROCEDURE — 1159F PR MEDICATION LIST DOCUMENTED IN MEDICAL RECORD: ICD-10-PCS | Mod: CPTII,S$GLB,, | Performed by: PHYSICIAN ASSISTANT

## 2023-10-24 PROCEDURE — 3078F DIAST BP <80 MM HG: CPT | Mod: CPTII,S$GLB,, | Performed by: PHYSICIAN ASSISTANT

## 2023-10-24 PROCEDURE — 3008F BODY MASS INDEX DOCD: CPT | Mod: CPTII,S$GLB,, | Performed by: PHYSICIAN ASSISTANT

## 2023-10-24 PROCEDURE — 4010F ACE/ARB THERAPY RXD/TAKEN: CPT | Mod: CPTII,S$GLB,, | Performed by: PHYSICIAN ASSISTANT

## 2023-10-24 PROCEDURE — 1101F PT FALLS ASSESS-DOCD LE1/YR: CPT | Mod: CPTII,S$GLB,, | Performed by: PHYSICIAN ASSISTANT

## 2023-10-24 PROCEDURE — 3288F FALL RISK ASSESSMENT DOCD: CPT | Mod: CPTII,S$GLB,, | Performed by: PHYSICIAN ASSISTANT

## 2023-10-24 PROCEDURE — 3074F SYST BP LT 130 MM HG: CPT | Mod: CPTII,S$GLB,, | Performed by: PHYSICIAN ASSISTANT

## 2023-10-24 PROCEDURE — 99214 PR OFFICE/OUTPT VISIT, EST, LEVL IV, 30-39 MIN: ICD-10-PCS | Mod: S$GLB,,, | Performed by: PHYSICIAN ASSISTANT

## 2023-10-24 PROCEDURE — 3044F HG A1C LEVEL LT 7.0%: CPT | Mod: CPTII,S$GLB,, | Performed by: PHYSICIAN ASSISTANT

## 2023-10-24 PROCEDURE — 1159F MED LIST DOCD IN RCRD: CPT | Mod: CPTII,S$GLB,, | Performed by: PHYSICIAN ASSISTANT

## 2023-10-24 PROCEDURE — 1126F AMNT PAIN NOTED NONE PRSNT: CPT | Mod: CPTII,S$GLB,, | Performed by: PHYSICIAN ASSISTANT

## 2023-10-24 PROCEDURE — 99999 PR PBB SHADOW E&M-EST. PATIENT-LVL IV: ICD-10-PCS | Mod: PBBFAC,,, | Performed by: PHYSICIAN ASSISTANT

## 2023-10-24 PROCEDURE — 3074F PR MOST RECENT SYSTOLIC BLOOD PRESSURE < 130 MM HG: ICD-10-PCS | Mod: CPTII,S$GLB,, | Performed by: PHYSICIAN ASSISTANT

## 2023-10-24 NOTE — PROGRESS NOTES
Cardiology Clinic Note  Reason for Visit: NICM  LOV w/ YASH Mclean: 9/12/2023    HPI:     PMHx:  HTN  HLD  NICM s/p CRT-D  - 2/13/2019, extracted due to infection 6/17/2020, reimplanted right side 9/28/2020  - Dr. Kwong      Papito Bhakta is a 68 y.o. male, who presents for follow up and titration of GDMT. He has previously been intolerant to losartan, ramipril and entresto was too expensive. He has had low BP readings at several clinic visits. He does not monitor his BP at home. He denies feeling dizzy or light headed. He reports fatigue and shortness of breath. His shortness of breath is chronic and not recently worsened. He denies PND and orthopnea. He has chronic lower extremity swelling. He states that this symptom first developed when he was stung by a wasp in 1986 and has never fully resolved. Furosemide 80 mg qD causes noticeable diuresis. He limits dietary sodium. He has lost 10 lbs in the past month that he attributes to GI illness, now resolved.     YASH Mclean HPI 9/12/2023  Complaint of rash affecting his trunk and legs since beginning losartan 8/18/2023. He stopped taking losartan around 9/4. He doesn't feel that the rash is improving just yet. He has not tried any OTC medications or topicals for symptomatic relief. Entresto has been too expensive in the past. He previously took ramipril for several years beginning in 2019, but states that it caused gynecomastia and worsened leg swelling. He did get jardiance and has been taking for the past 2-3 weeks. He does not monitor his BP at home. EF on 8/18 TTE is 15-20%.       ROS:    Pertinent ROS included in HPI and below.  PMH:     Past Medical History:   Diagnosis Date    RIGOBERTO (acute kidney injury) 10/7/2020    Anticoagulant long-term use     Aspirin    CHF (congestive heart failure)     Hyperlipidemia     Hypertension     NICM (nonischemic cardiomyopathy) 6/16/2020    Obesity     AMELIA (obstructive sleep apnea) 1/7/2022    Peripheral vascular disease,  unspecified 2/1/2021     Past Surgical History:   Procedure Laterality Date    INSERTION OF BIVENTRICULAR IMPLANTABLE CARDIOVERTER-DEFIBRILLATOR (ICD) N/A 02/13/2019    Procedure: INSERTION, ICD, BIVENTRICULAR;  Surgeon: Shailesh Eng MD;  [facility] CATH LAB;  Service: Cardiology;  Laterality: N/A;  RN PRE OP 2-6-19  1ST CASE PER  RADHA. NOTIFIED RADHA THAT ANESTHESIA IS NOT PITO FOR 1ST CASE START-LO    INSERTION OF BIVENTRICULAR IMPLANTABLE CARDIOVERTER-DEFIBRILLATOR (ICD) N/A 09/28/2020    Procedure: INSERTION, ICD, BIVENTRICULAR;  Surgeon: Jim Kwong MD;  Location: [facility] EP LAB;  Service: Cardiology;  Laterality: N/A;  NICM, CRT-D, SJM,, MAC, DM, 3 Prep*Wearing LifeVest*    oral extraction  11/2018    TESTICLE SURGERY       Allergies:     Review of patient's allergies indicates:   Allergen Reactions    Ramipril      Leg swelling and gynecomastia     Losartan Rash     Medications:     Current Outpatient Medications on File Prior to Visit   Medication Sig Dispense Refill    acetaminophen (TYLENOL) 500 MG tablet Take 2 tablets (1,000 mg total) by mouth every 8 (eight) hours as needed for Pain or Temperature greater than (100.5). 30 tablet 0    amiodarone (PACERONE) 200 MG Tab Take 1 tablet (200 mg total) by mouth once daily. 90 tablet 3    aspirin (ECOTRIN) 325 MG EC tablet Take 1 tablet (325 mg total) by mouth once daily. 90 tablet 3    empagliflozin (JARDIANCE) 10 mg tablet Take 1 tablet (10 mg total) by mouth once daily. 90 tablet 3    furosemide (LASIX) 80 MG tablet TAKE 1 TABLET EVERY DAY 90 tablet 1    gabapentin (NEURONTIN) 100 MG capsule Take 1 capsule (100 mg total) by mouth 3 (three) times daily. Take 100 mg by mouth 3 (three) times daily. 270 capsule 1    hydrocortisone 1 % cream Apply topically 2 (two) times daily. 30 g 0    meloxicam (MOBIC) 15 MG tablet Take 1 tablet (15 mg total) by mouth daily as needed for Pain. 30 tablet 0    methocarbamoL (ROBAXIN) 500 MG Tab Take 2 tablets (1,000 mg  "total) by mouth 3 (three) times daily as needed (Pain not improved by other medications). 30 tablet 0    metoprolol succinate (TOPROL-XL) 50 MG 24 hr tablet TAKE 1 TABLET EVERY DAY 90 tablet 3    potassium chloride (K-TAB) 20 mEq TAKE 1 TABLET EVERY DAY 90 tablet 1    pravastatin (PRAVACHOL) 80 MG tablet TAKE 1 TABLET EVERY DAY 90 tablet 1    spironolactone (ALDACTONE) 25 MG tablet TAKE 1/2 TABLET (12.5 MG TOTAL) ONCE DAILY. HOLD IF SYSTOLIC BLOOD PRESSURE IS LESS THAN 110 45 tablet 5    [DISCONTINUED] ramipril (ALTACE) 10 MG capsule Take 1 capsule (10 mg total) by mouth once daily. 90 capsule 3     No current facility-administered medications on file prior to visit.     Social History:     Social History     Tobacco Use    Smoking status: Former     Current packs/day: 0.00     Average packs/day: 0.5 packs/day for 40.0 years (20.0 ttl pk-yrs)     Types: Cigarettes, Cigars     Start date:      Quit date:      Years since quittin.8    Smokeless tobacco: Never   Substance Use Topics    Alcohol use: Yes     Comment: once a month     Family History:     Family History   Problem Relation Age of Onset    Epilepsy Mother      Physical Exam:   /66   Pulse 60   Ht 6' 5" (1.956 m)   Wt 129 kg (284 lb 6.3 oz)   BMI 33.72 kg/m²      Physical Exam  Vitals and nursing note reviewed.   Constitutional:       Appearance: Normal appearance.   HENT:      Head: Normocephalic and atraumatic.   Neck:      Vascular: No carotid bruit or hepatojugular reflux.   Cardiovascular:      Rate and Rhythm: Normal rate and regular rhythm.      Pulses:           Carotid pulses are 2+ on the right side and 2+ on the left side.       Radial pulses are 2+ on the right side and 2+ on the left side.      Heart sounds: S1 normal and S2 normal. Murmur heard.      Systolic murmur is present with a grade of 2/6.   Pulmonary:      Effort: Pulmonary effort is normal.      Breath sounds: Normal breath sounds.   Abdominal:      General: Bowel " sounds are normal.      Palpations: Abdomen is soft.      Tenderness: There is no abdominal tenderness.   Musculoskeletal:      Right lower leg: 3+ Edema present.      Left lower leg: 3+ Edema present.   Feet:      Right foot:      Skin integrity: Skin integrity normal.      Left foot:      Skin integrity: Skin integrity normal.   Neurological:      Mental Status: He is alert.   Psychiatric:         Behavior: Behavior is cooperative.          Labs:     Blood Tests:  Lab Results   Component Value Date     (H) 08/25/2023     08/25/2023    K 4.0 08/25/2023     08/25/2023    CO2 24 08/25/2023    BUN 21 08/25/2023    CREATININE 1.4 08/25/2023    GLU 79 08/25/2023    HGBA1C 5.8 (H) 09/14/2023    MG 1.7 11/21/2020    AST 19 08/25/2023    ALT 16 08/25/2023    ALBUMIN 3.3 (L) 08/25/2023    PROT 7.1 08/25/2023    BILITOT 1.7 (H) 08/25/2023    WBC 4.68 05/12/2023    HGB 12.8 (L) 05/12/2023    HCT 39.8 (L) 05/12/2023    HCT 37 09/28/2020    MCV 78 (L) 05/12/2023     (L) 05/12/2023    INR 1.0 04/02/2021    TSH 2.764 09/14/2023       Lab Results   Component Value Date    CHOL 103 (L) 09/14/2023    HDL 35 (L) 09/14/2023    TRIG 51 09/14/2023       Lab Results   Component Value Date    LDLCALC 57.8 (L) 09/14/2023         Imaging:     Echocardiogram  TTE 8/18/2023    Left Ventricle: The left ventricle is severely dilated. Increased ventricular mass. Normal wall thickness. Global hypokinesis present. There is severely reduced systolic function with a visually estimated ejection fraction of 15 - 20%. There is diastolic dysfunction.    Left Atrium: Left atrium is severely dilated.    Right Ventricle: Severe right ventricular enlargement. Wall thickness is normal. Systolic function is moderately reduced. Pacemaker lead present in the ventricle.    Right Atrium: Right atrium is dilated.    Mitral Valve: Mild mitral annular calcification. There is moderate regurgitation.    Tricuspid Valve: There is mild  regurgitation.    Pulmonic Valve: There is mild regurgitation.    Pulmonary Artery: The estimated pulmonary artery systolic pressure is 59 mmHg.    IVC/SVC: Elevated venous pressure at 15 mmHg.    Cath Procedure  [facility] 2018    Normal coronary arteries.     Systolic dysfunction.     Low right and left Filling Pressures.     Mild Pulmonary Hypertension.     [id number]  Atrial-sensed ventricular-paced rhythm   Biventricular pacemaker detected   When compared with ECG of 02-AUG-2023 08:50,   Premature ventricular complexes are no longer Present     Assessment:     1. Chronic combined systolic and diastolic congestive heart failure    2. NICM (nonischemic cardiomyopathy)    3. Biventricular ICD (implantable cardioverter-defibrillator) in place    4. Ventricular tachycardia    5. Essential hypertension    6. Mixed hyperlipidemia    7. Peripheral vascular disease, unspecified    8. AMELIA (obstructive sleep apnea)    9. BMI 33.0-33.9,adult          Plan:     Chronic combined systolic and diastolic congestive heart failure  NICM (nonischemic cardiomyopathy)  Limit dietary sodium to < 2g/day   Limit fluid intake to <1500 cc/day   Daily weights   Continue the following: Jardiance 10 mg qD, metoprolol succinate 50 mg qD and spironolactone 12.5 mg qD   Previous side effects to ramipril and losartan, entresto was too expensive   Could consider Bidil at f/u if BP will allow    Biventricular ICD (implantable cardioverter-defibrillator) in place  Ventricular tachycardia  Continue amiodarone 200 mg qD  Follows with Dr. Kwong, EP    Essential hypertension  Continue medications as above  Recommend monitoring BP at home     Mixed hyperlipidemia  Continue pravastatin 80 mg qD  Last FLP 2023: LDL 57.8  Discussed and recommended patient adhere to Mediterranean and DASH diets.     Peripheral vascular disease, unspecified  Recommend limiting dietary sodium to < 2 g per day  Elevate legs when possible   Compression stockings during  the day as tolerable, especially when traveling   Continue furosemide 80 mg qD and potassium 20 mEq qD    AMELIA (obstructive sleep apnea)  Encouraged CPAP compliance     BMI 33.0-33.9,adult  Weight loss through a combination of diet and exercise encouraged  Recommend 150 minutes a week (30 minutes per day, 5 days per week) of moderate intensity exercise        Signed:  Ashley Mclean PA-C  Cardiology     10/24/2023 3:20 PM    Follow-up:     Future Appointments   Date Time Provider Department Center   11/2/2023 10:00 AM Christina Diaz FNP [facility]   11/21/2023 10:15 AM Stefanie Torrez, DPM [facility] POD [facility]   12/11/2023 10:00 AM HOME MONITOR DEVICE CHECK, [facility]   12/28/2023  2:00 PM Herminio Brewster MD [facility]

## 2023-11-02 ENCOUNTER — OFFICE VISIT (OUTPATIENT)
Dept: FAMILY MEDICINE | Facility: CLINIC | Age: 68
End: 2023-11-02
Payer: MEDICARE

## 2023-11-02 VITALS
TEMPERATURE: 98 F | WEIGHT: 281.88 LBS | OXYGEN SATURATION: 99 % | HEIGHT: 77 IN | SYSTOLIC BLOOD PRESSURE: 110 MMHG | HEART RATE: 69 BPM | BODY MASS INDEX: 33.28 KG/M2 | DIASTOLIC BLOOD PRESSURE: 62 MMHG

## 2023-11-02 DIAGNOSIS — Z00.00 ENCOUNTER FOR PREVENTIVE HEALTH EXAMINATION: Primary | ICD-10-CM

## 2023-11-02 DIAGNOSIS — E66.09 CLASS 1 OBESITY DUE TO EXCESS CALORIES WITH SERIOUS COMORBIDITY AND BODY MASS INDEX (BMI) OF 33.0 TO 33.9 IN ADULT: ICD-10-CM

## 2023-11-02 DIAGNOSIS — I10 ESSENTIAL HYPERTENSION: Chronic | ICD-10-CM

## 2023-11-02 DIAGNOSIS — I50.42 CHRONIC COMBINED SYSTOLIC AND DIASTOLIC CONGESTIVE HEART FAILURE: ICD-10-CM

## 2023-11-02 DIAGNOSIS — I73.9 PERIPHERAL VASCULAR DISEASE, UNSPECIFIED: ICD-10-CM

## 2023-11-02 DIAGNOSIS — E78.5 HYPERLIPIDEMIA, UNSPECIFIED HYPERLIPIDEMIA TYPE: Chronic | ICD-10-CM

## 2023-11-02 DIAGNOSIS — D69.6 THROMBOCYTOPENIA, UNSPECIFIED: ICD-10-CM

## 2023-11-02 DIAGNOSIS — I47.20 VENTRICULAR TACHYCARDIA: ICD-10-CM

## 2023-11-02 DIAGNOSIS — Z95.810 BIVENTRICULAR ICD (IMPLANTABLE CARDIOVERTER-DEFIBRILLATOR) IN PLACE: ICD-10-CM

## 2023-11-02 DIAGNOSIS — N18.31 STAGE 3A CHRONIC KIDNEY DISEASE: ICD-10-CM

## 2023-11-02 DIAGNOSIS — I42.8 NICM (NONISCHEMIC CARDIOMYOPATHY): Chronic | ICD-10-CM

## 2023-11-02 PROBLEM — E78.49 OTHER HYPERLIPIDEMIA: Status: RESOLVED | Noted: 2017-12-01 | Resolved: 2023-11-02

## 2023-11-02 PROBLEM — E66.01 SEVERE OBESITY (BMI 35.0-39.9) WITH COMORBIDITY: Status: RESOLVED | Noted: 2019-05-01 | Resolved: 2023-11-02

## 2023-11-02 PROCEDURE — 4010F ACE/ARB THERAPY RXD/TAKEN: CPT | Mod: CPTII,S$GLB,, | Performed by: NURSE PRACTITIONER

## 2023-11-02 PROCEDURE — 1159F MED LIST DOCD IN RCRD: CPT | Mod: CPTII,S$GLB,, | Performed by: NURSE PRACTITIONER

## 2023-11-02 PROCEDURE — 3078F PR MOST RECENT DIASTOLIC BLOOD PRESSURE < 80 MM HG: ICD-10-PCS | Mod: CPTII,S$GLB,, | Performed by: NURSE PRACTITIONER

## 2023-11-02 PROCEDURE — 99999 PR PBB SHADOW E&M-EST. PATIENT-LVL V: CPT | Mod: PBBFAC,,, | Performed by: NURSE PRACTITIONER

## 2023-11-02 PROCEDURE — 1101F PR PT FALLS ASSESS DOC 0-1 FALLS W/OUT INJ PAST YR: ICD-10-PCS | Mod: CPTII,S$GLB,, | Performed by: NURSE PRACTITIONER

## 2023-11-02 PROCEDURE — 1126F AMNT PAIN NOTED NONE PRSNT: CPT | Mod: CPTII,S$GLB,, | Performed by: NURSE PRACTITIONER

## 2023-11-02 PROCEDURE — 1170F PR FUNCTIONAL STATUS ASSESSED: ICD-10-PCS | Mod: CPTII,S$GLB,, | Performed by: NURSE PRACTITIONER

## 2023-11-02 PROCEDURE — 3044F HG A1C LEVEL LT 7.0%: CPT | Mod: CPTII,S$GLB,, | Performed by: NURSE PRACTITIONER

## 2023-11-02 PROCEDURE — 1126F PR PAIN SEVERITY QUANTIFIED, NO PAIN PRESENT: ICD-10-PCS | Mod: CPTII,S$GLB,, | Performed by: NURSE PRACTITIONER

## 2023-11-02 PROCEDURE — 3288F FALL RISK ASSESSMENT DOCD: CPT | Mod: CPTII,S$GLB,, | Performed by: NURSE PRACTITIONER

## 2023-11-02 PROCEDURE — 1170F FXNL STATUS ASSESSED: CPT | Mod: CPTII,S$GLB,, | Performed by: NURSE PRACTITIONER

## 2023-11-02 PROCEDURE — 3074F PR MOST RECENT SYSTOLIC BLOOD PRESSURE < 130 MM HG: ICD-10-PCS | Mod: CPTII,S$GLB,, | Performed by: NURSE PRACTITIONER

## 2023-11-02 PROCEDURE — 3078F DIAST BP <80 MM HG: CPT | Mod: CPTII,S$GLB,, | Performed by: NURSE PRACTITIONER

## 2023-11-02 PROCEDURE — 1160F PR REVIEW ALL MEDS BY PRESCRIBER/CLIN PHARMACIST DOCUMENTED: ICD-10-PCS | Mod: CPTII,S$GLB,, | Performed by: NURSE PRACTITIONER

## 2023-11-02 PROCEDURE — 3288F PR FALLS RISK ASSESSMENT DOCUMENTED: ICD-10-PCS | Mod: CPTII,S$GLB,, | Performed by: NURSE PRACTITIONER

## 2023-11-02 PROCEDURE — G0439 PR MEDICARE ANNUAL WELLNESS SUBSEQUENT VISIT: ICD-10-PCS | Mod: S$GLB,,, | Performed by: NURSE PRACTITIONER

## 2023-11-02 PROCEDURE — 3074F SYST BP LT 130 MM HG: CPT | Mod: CPTII,S$GLB,, | Performed by: NURSE PRACTITIONER

## 2023-11-02 PROCEDURE — 3044F PR MOST RECENT HEMOGLOBIN A1C LEVEL <7.0%: ICD-10-PCS | Mod: CPTII,S$GLB,, | Performed by: NURSE PRACTITIONER

## 2023-11-02 PROCEDURE — 1101F PT FALLS ASSESS-DOCD LE1/YR: CPT | Mod: CPTII,S$GLB,, | Performed by: NURSE PRACTITIONER

## 2023-11-02 PROCEDURE — 1159F PR MEDICATION LIST DOCUMENTED IN MEDICAL RECORD: ICD-10-PCS | Mod: CPTII,S$GLB,, | Performed by: NURSE PRACTITIONER

## 2023-11-02 PROCEDURE — 99999 PR PBB SHADOW E&M-EST. PATIENT-LVL V: ICD-10-PCS | Mod: PBBFAC,,, | Performed by: NURSE PRACTITIONER

## 2023-11-02 PROCEDURE — 4010F PR ACE/ARB THEARPY RXD/TAKEN: ICD-10-PCS | Mod: CPTII,S$GLB,, | Performed by: NURSE PRACTITIONER

## 2023-11-02 PROCEDURE — 1160F RVW MEDS BY RX/DR IN RCRD: CPT | Mod: CPTII,S$GLB,, | Performed by: NURSE PRACTITIONER

## 2023-11-02 PROCEDURE — G0439 PPPS, SUBSEQ VISIT: HCPCS | Mod: S$GLB,,, | Performed by: NURSE PRACTITIONER

## 2023-11-02 NOTE — PROGRESS NOTES
"  Papito Bhakta presented for a  Medicare AWV and comprehensive Health Risk Assessment today. The following components were reviewed and updated:    Medical history  Family History  Social history  Allergies and Current Medications  Health Risk Assessment  Health Maintenance  Care Team       ** See Completed Assessments for Annual Wellness Visit within the encounter summary.**       The following assessments were completed:  Living Situation  CAGE  Depression Screening  Timed Get Up and Go  Whisper Test  Cognitive Function Screening  Nutrition Screening  ADL Screening  PAQ Screening          Vitals:    11/02/23 1005   BP: 110/62   Pulse: 69   Temp: 97.9 °F (36.6 °C)   TempSrc: Axillary   SpO2: 99%   Weight: 127.9 kg (281 lb 13.7 oz)   Height: 6' 5" (1.956 m)     Body mass index is 33.42 kg/m².  Physical Exam  Vitals and nursing note reviewed.   Constitutional:       Appearance: Normal appearance.   Cardiovascular:      Rate and Rhythm: Normal rate.      Pulses: Normal pulses.      Heart sounds: Normal heart sounds.   Pulmonary:      Effort: Pulmonary effort is normal.      Breath sounds: Normal breath sounds.   Musculoskeletal:      Comments: Ambulates with crutches   Skin:         Neurological:      Mental Status: He is alert and oriented to person, place, and time.   Psychiatric:         Mood and Affect: Mood normal.         Behavior: Behavior normal.             Diagnoses and health risks identified today and associated recommendations/orders:    1. Encounter for preventive health examination  Pt was seen today for an Annual Wellness visit. Healthcare maintenance and screening recommendations were discussed and updated as indicated. Return in one year for AWV.    Review current opioid prescriptions:n/a  Screen for potential Substance Use Disorders:n/a    2. Chronic combined systolic and diastolic congestive heart failure  Stable. The current medical regimen is effective;  continue present plan and " medications.    3. NICM (nonischemic cardiomyopathy)  The current medical regimen is effective;  continue present plan and medications.    4. Peripheral vascular disease, unspecified  The current medical regimen is effective;  continue present plan and medications.    5. Stage 3a chronic kidney disease  Stable. The current medical regimen is effective;  continue present plan and medications.    6. Ventricular tachycardia  Right chest wall ICD. The current medical regimen is effective;  continue present plan and medications.    7. Thrombocytopenia, unspecified  The current medical regimen is effective;  continue present plan and medications.    8. Class 1 obesity due to excess calories with serious comorbidity and body mass index (BMI) of 33.0 to 33.9 in adult  The patient is asked to make an attempt to improve diet and exercise patterns to aid in medical management of this problem.    9. Biventricular ICD (implantable cardioverter-defibrillator) in place  The current medical regimen is effective;  continue present plan and medications.    10. Essential hypertension  Stable. The current medical regimen is effective;  continue present plan and medications.    11. Hyperlipidemia, unspecified hyperlipidemia type  The current medical regimen is effective;  continue present plan and medications.        Provided Papito with a 5-10 year written screening schedule and personal prevention plan. Recommendations were developed using the USPSTF age appropriate recommendations. Education, counseling, and referrals were provided as needed. After Visit Summary printed and given to patient which includes a list of additional screenings\tests needed.    Follow up in about 1 year (around 11/2/2024).    SANJAY Reina  I offered to discuss advanced care planning, including how to pick a person who would make decisions for you if you were unable to make them for yourself, called a health care power of , and what kind of  decisions you might make such as use of life sustaining treatments such as ventilators and tube feeding when faced with a life limiting illness recorded on a living will that they will need to know. (How you want to be cared for as you near the end of your natural life)     X  Patient has advanced directives on file, which we reviewed, and they do not wish to make changes.

## 2023-11-08 DIAGNOSIS — I42.8 NICM (NONISCHEMIC CARDIOMYOPATHY): Primary | ICD-10-CM

## 2023-11-10 ENCOUNTER — HOSPITAL ENCOUNTER (INPATIENT)
Facility: HOSPITAL | Age: 68
LOS: 3 days | Discharge: HOME | DRG: 291 | End: 2023-11-15
Attending: EMERGENCY MEDICINE | Admitting: EMERGENCY MEDICINE
Payer: MEDICARE

## 2023-11-10 DIAGNOSIS — I50.9 CHF EXACERBATION: ICD-10-CM

## 2023-11-10 DIAGNOSIS — R10.13 EPIGASTRIC PAIN: ICD-10-CM

## 2023-11-10 DIAGNOSIS — I50.9 CHF (CONGESTIVE HEART FAILURE): ICD-10-CM

## 2023-11-10 DIAGNOSIS — I50.42 CHRONIC COMBINED SYSTOLIC AND DIASTOLIC CONGESTIVE HEART FAILURE: ICD-10-CM

## 2023-11-10 DIAGNOSIS — I50.43 ACUTE ON CHRONIC COMBINED SYSTOLIC AND DIASTOLIC CHF (CONGESTIVE HEART FAILURE): Primary | ICD-10-CM

## 2023-11-10 PROBLEM — R11.0 NAUSEA: Status: ACTIVE | Noted: 2023-11-10

## 2023-11-10 LAB
ALBUMIN SERPL BCP-MCNC: 3.2 G/DL (ref 3.5–5.2)
ALBUMIN SERPL-MCNC: 3.5 G/DL (ref 3.3–5.5)
ALBUMIN SERPL-MCNC: 3.6 G/DL (ref 3.3–5.5)
ALP SERPL-CCNC: 87 U/L (ref 42–141)
ALP SERPL-CCNC: 89 U/L (ref 42–141)
ALP SERPL-CCNC: 99 U/L (ref 55–135)
ALT SERPL W/O P-5'-P-CCNC: 46 U/L (ref 10–44)
ANION GAP SERPL CALC-SCNC: 15 MMOL/L (ref 8–16)
APAP SERPL-MCNC: <3 UG/ML (ref 10–20)
AST SERPL-CCNC: 46 U/L (ref 10–40)
BASOPHILS # BLD AUTO: 0.05 K/UL (ref 0–0.2)
BASOPHILS NFR BLD: 1 % (ref 0–1.9)
BILIRUB SERPL-MCNC: 2.2 MG/DL (ref 0.2–1.6)
BILIRUB SERPL-MCNC: 4.1 MG/DL (ref 0.2–1.6)
BILIRUB SERPL-MCNC: 4.9 MG/DL (ref 0.1–1)
BNP SERPL-MCNC: 1592 PG/ML (ref 0–99)
BUN SERPL-MCNC: 31 MG/DL (ref 7–22)
BUN SERPL-MCNC: 33 MG/DL (ref 8–23)
CALCIUM SERPL-MCNC: 9.6 MG/DL (ref 8–10.3)
CALCIUM SERPL-MCNC: 9.8 MG/DL (ref 8.7–10.5)
CHLORIDE SERPL-SCNC: 100 MMOL/L (ref 98–108)
CHLORIDE SERPL-SCNC: 103 MMOL/L (ref 95–110)
CO2 SERPL-SCNC: 21 MMOL/L (ref 23–29)
CREAT SERPL-MCNC: 1.6 MG/DL (ref 0.6–1.2)
CREAT SERPL-MCNC: 1.9 MG/DL (ref 0.5–1.4)
DIFFERENTIAL METHOD: ABNORMAL
EOSINOPHIL # BLD AUTO: 0 K/UL (ref 0–0.5)
EOSINOPHIL NFR BLD: 0.4 % (ref 0–8)
ERYTHROCYTE [DISTWIDTH] IN BLOOD BY AUTOMATED COUNT: 19.2 % (ref 11.5–14.5)
EST. GFR  (NO RACE VARIABLE): 38 ML/MIN/1.73 M^2
GLUCOSE SERPL-MCNC: 100 MG/DL (ref 73–118)
GLUCOSE SERPL-MCNC: 90 MG/DL (ref 70–110)
HCT VFR BLD AUTO: 41.6 % (ref 40–54)
HGB BLD-MCNC: 12.9 G/DL (ref 14–18)
IMM GRANULOCYTES # BLD AUTO: 0.01 K/UL (ref 0–0.04)
IMM GRANULOCYTES NFR BLD AUTO: 0.2 % (ref 0–0.5)
LYMPHOCYTES # BLD AUTO: 1.6 K/UL (ref 1–4.8)
LYMPHOCYTES NFR BLD: 30.7 % (ref 18–48)
MAGNESIUM SERPL-MCNC: 2 MG/DL (ref 1.6–2.6)
MCH RBC QN AUTO: 22.8 PG (ref 27–31)
MCHC RBC AUTO-ENTMCNC: 31 G/DL (ref 32–36)
MCV RBC AUTO: 74 FL (ref 82–98)
MONOCYTES # BLD AUTO: 0.9 K/UL (ref 0.3–1)
MONOCYTES NFR BLD: 16.7 % (ref 4–15)
NEUTROPHILS # BLD AUTO: 2.6 K/UL (ref 1.8–7.7)
NEUTROPHILS NFR BLD: 51 % (ref 38–73)
NRBC BLD-RTO: 0 /100 WBC
PLATELET # BLD AUTO: 116 K/UL (ref 150–450)
PMV BLD AUTO: ABNORMAL FL (ref 9.2–12.9)
POC ALT (SGPT): 35 U/L (ref 10–47)
POC ALT (SGPT): 45 U/L (ref 10–47)
POC AMYLASE: 46 U/L (ref 14–97)
POC AST (SGOT): 56 U/L (ref 11–38)
POC AST (SGOT): 65 U/L (ref 11–38)
POC B-TYPE NATRIURETIC PEPTIDE: 1270 PG/ML (ref 0–100)
POC CARDIAC TROPONIN I: 0.01 NG/ML (ref 0–0.08)
POC GGT: 39 U/L (ref 5–65)
POC TCO2: 28 MMOL/L (ref 18–33)
POTASSIUM BLD-SCNC: 5 MMOL/L (ref 3.6–5.1)
POTASSIUM SERPL-SCNC: 4.3 MMOL/L (ref 3.5–5.1)
PROT SERPL-MCNC: 7.2 G/DL (ref 6–8.4)
PROTEIN, POC: 7.3 G/DL (ref 6.4–8.1)
PROTEIN, POC: 7.5 G/DL (ref 6.4–8.1)
RBC # BLD AUTO: 5.65 M/UL (ref 4.6–6.2)
SAMPLE: NORMAL
SODIUM BLD-SCNC: 139 MMOL/L (ref 128–145)
SODIUM SERPL-SCNC: 139 MMOL/L (ref 136–145)
WBC # BLD AUTO: 5.15 K/UL (ref 3.9–12.7)

## 2023-11-10 PROCEDURE — 82040 ASSAY OF SERUM ALBUMIN: CPT | Mod: ER

## 2023-11-10 PROCEDURE — 85025 COMPLETE CBC W/AUTO DIFF WBC: CPT | Mod: ER

## 2023-11-10 PROCEDURE — 83880 ASSAY OF NATRIURETIC PEPTIDE: CPT | Performed by: PHYSICIAN ASSISTANT

## 2023-11-10 PROCEDURE — 93010 ELECTROCARDIOGRAM REPORT: CPT | Mod: ,,, | Performed by: INTERNAL MEDICINE

## 2023-11-10 PROCEDURE — 84484 ASSAY OF TROPONIN QUANT: CPT | Performed by: PHYSICIAN ASSISTANT

## 2023-11-10 PROCEDURE — G0378 HOSPITAL OBSERVATION PER HR: HCPCS | Mod: ER

## 2023-11-10 PROCEDURE — 99285 EMERGENCY DEPT VISIT HI MDM: CPT | Mod: 25,ER

## 2023-11-10 PROCEDURE — 83036 HEMOGLOBIN GLYCOSYLATED A1C: CPT | Performed by: PHYSICIAN ASSISTANT

## 2023-11-10 PROCEDURE — 96374 THER/PROPH/DIAG INJ IV PUSH: CPT | Mod: ER

## 2023-11-10 PROCEDURE — 93005 ELECTROCARDIOGRAM TRACING: CPT | Mod: ER

## 2023-11-10 PROCEDURE — 83735 ASSAY OF MAGNESIUM: CPT | Performed by: PHYSICIAN ASSISTANT

## 2023-11-10 PROCEDURE — 36415 COLL VENOUS BLD VENIPUNCTURE: CPT | Performed by: PHYSICIAN ASSISTANT

## 2023-11-10 PROCEDURE — 80053 COMPREHEN METABOLIC PANEL: CPT | Performed by: PHYSICIAN ASSISTANT

## 2023-11-10 PROCEDURE — 83880 ASSAY OF NATRIURETIC PEPTIDE: CPT | Mod: ER

## 2023-11-10 PROCEDURE — 25000003 PHARM REV CODE 250: Mod: ER

## 2023-11-10 PROCEDURE — 85025 COMPLETE CBC W/AUTO DIFF WBC: CPT | Performed by: PHYSICIAN ASSISTANT

## 2023-11-10 PROCEDURE — 93010 EKG 12-LEAD: ICD-10-PCS | Mod: ,,, | Performed by: INTERNAL MEDICINE

## 2023-11-10 PROCEDURE — 63600175 PHARM REV CODE 636 W HCPCS: Mod: ER | Performed by: EMERGENCY MEDICINE

## 2023-11-10 PROCEDURE — G0378 HOSPITAL OBSERVATION PER HR: HCPCS

## 2023-11-10 PROCEDURE — 80143 DRUG ASSAY ACETAMINOPHEN: CPT | Performed by: PHYSICIAN ASSISTANT

## 2023-11-10 PROCEDURE — 80053 COMPREHEN METABOLIC PANEL: CPT | Mod: ER

## 2023-11-10 PROCEDURE — 96375 TX/PRO/DX INJ NEW DRUG ADDON: CPT | Mod: ER

## 2023-11-10 PROCEDURE — 84484 ASSAY OF TROPONIN QUANT: CPT | Mod: ER

## 2023-11-10 RX ORDER — AMOXICILLIN 250 MG
1 CAPSULE ORAL DAILY PRN
Status: DISCONTINUED | OUTPATIENT
Start: 2023-11-10 | End: 2023-11-15 | Stop reason: HOSPADM

## 2023-11-10 RX ORDER — FUROSEMIDE 10 MG/ML
40 INJECTION INTRAMUSCULAR; INTRAVENOUS
Status: COMPLETED | OUTPATIENT
Start: 2023-11-10 | End: 2023-11-10

## 2023-11-10 RX ORDER — METOPROLOL SUCCINATE 50 MG/1
50 TABLET, EXTENDED RELEASE ORAL DAILY
Status: DISCONTINUED | OUTPATIENT
Start: 2023-11-11 | End: 2023-11-12

## 2023-11-10 RX ORDER — LOSARTAN POTASSIUM 25 MG/1
25 TABLET ORAL DAILY
COMMUNITY
Start: 2023-10-25 | End: 2023-11-29

## 2023-11-10 RX ORDER — SODIUM CHLORIDE 0.9 % (FLUSH) 0.9 %
10 SYRINGE (ML) INJECTION
Status: DISCONTINUED | OUTPATIENT
Start: 2023-11-10 | End: 2023-11-15 | Stop reason: HOSPADM

## 2023-11-10 RX ORDER — FAMOTIDINE 10 MG/ML
20 INJECTION INTRAVENOUS
Status: COMPLETED | OUTPATIENT
Start: 2023-11-10 | End: 2023-11-10

## 2023-11-10 RX ORDER — AMIODARONE HYDROCHLORIDE 200 MG/1
200 TABLET ORAL DAILY
Status: DISCONTINUED | OUTPATIENT
Start: 2023-11-11 | End: 2023-11-10

## 2023-11-10 RX ORDER — FUROSEMIDE 10 MG/ML
60 INJECTION INTRAMUSCULAR; INTRAVENOUS
Status: DISCONTINUED | OUTPATIENT
Start: 2023-11-11 | End: 2023-11-11

## 2023-11-10 RX ORDER — MAG HYDROX/ALUMINUM HYD/SIMETH 200-200-20
30 SUSPENSION, ORAL (FINAL DOSE FORM) ORAL
Status: COMPLETED | OUTPATIENT
Start: 2023-11-10 | End: 2023-11-10

## 2023-11-10 RX ORDER — ACETAMINOPHEN 325 MG/1
650 TABLET ORAL EVERY 6 HOURS PRN
Status: DISCONTINUED | OUTPATIENT
Start: 2023-11-10 | End: 2023-11-15 | Stop reason: HOSPADM

## 2023-11-10 RX ORDER — PRAVASTATIN SODIUM 40 MG/1
80 TABLET ORAL DAILY
Status: DISCONTINUED | OUTPATIENT
Start: 2023-11-11 | End: 2023-11-10

## 2023-11-10 RX ORDER — NAPROXEN SODIUM 220 MG/1
81 TABLET, FILM COATED ORAL DAILY
Status: DISCONTINUED | OUTPATIENT
Start: 2023-11-11 | End: 2023-11-15 | Stop reason: HOSPADM

## 2023-11-10 RX ORDER — ASPIRIN 325 MG
325 TABLET, DELAYED RELEASE (ENTERIC COATED) ORAL DAILY
Status: DISCONTINUED | OUTPATIENT
Start: 2023-11-11 | End: 2023-11-10

## 2023-11-10 RX ORDER — TALC
6 POWDER (GRAM) TOPICAL NIGHTLY PRN
Status: DISCONTINUED | OUTPATIENT
Start: 2023-11-10 | End: 2023-11-15 | Stop reason: HOSPADM

## 2023-11-10 RX ADMIN — FUROSEMIDE 40 MG: 10 INJECTION, SOLUTION INTRAVENOUS at 05:11

## 2023-11-10 RX ADMIN — FAMOTIDINE 20 MG: 10 INJECTION INTRAVENOUS at 05:11

## 2023-11-10 RX ADMIN — ALUMINUM HYDROXIDE, MAGNESIUM HYDROXIDE, AND DIMETHICONE 30 ML: 200; 20; 200 SUSPENSION ORAL at 05:11

## 2023-11-10 NOTE — ED PROVIDER NOTES
Encounter Date: 11/10/2023       History     Chief Complaint   Patient presents with    Abdominal Pain     Pt has been having N/V/D without abd pain for 3 weeks.      Patient is a 68-year-old male with a past medical history of congestive heart failure, hypertension, hyperlipidemia who presents to the emergency department for evaluation of sharp intermittent epigastric pain x 2 months.  Reports nausea, vomiting, diarrhea x 1 week. Patient reports his daughter made him come in for evaluation.  Denies history of acid reflux or indigestion.  Reports EGD in the past, but states he has not had 1 recently.  Denies history of gallstones, pancreatitis, heavy NSAID use.  Denies hematemesis.  Denies blood in the stool.  Reports baseline shortness of breath, but states nothing worsening.  Reports baseline leg swelling, but states nothing worsening.  Denies chest pain.  Denies fever, chills.  Denies cough, congestion, sore throat, difficulty swallowing, rhinorrhea, otalgia.  Denies dysuria, urinary frequency, hematuria, flank pain.    The history is provided by the patient.     Review of patient's allergies indicates:   Allergen Reactions    Ramipril      Leg swelling and gynecomastia     Losartan Rash     Past Medical History:   Diagnosis Date    RIGOBERTO (acute kidney injury) 10/7/2020    Anticoagulant long-term use     Aspirin    CHF (congestive heart failure)     Hyperlipidemia     Hypertension     NICM (nonischemic cardiomyopathy) 6/16/2020    Obesity     AMELIA (obstructive sleep apnea) 1/7/2022    Peripheral vascular disease, unspecified 2/1/2021     Past Surgical History:   Procedure Laterality Date    INSERTION OF BIVENTRICULAR IMPLANTABLE CARDIOVERTER-DEFIBRILLATOR (ICD) N/A 02/13/2019    Procedure: INSERTION, ICD, BIVENTRICULAR;  Surgeon: Shailesh Eng MD;  Location: [facility] CATH LAB;  Service: Cardiology;  Laterality: N/A;  RN PRE OP 2-6-19  1ST CASE PER  RADHA. NOTIFIED RADHA THAT ANESTHESIA IS NOT PITO FOR 1ST CASE  START-LO    INSERTION OF BIVENTRICULAR IMPLANTABLE CARDIOVERTER-DEFIBRILLATOR (ICD) N/A 2020    Procedure: INSERTION, ICD, BIVENTRICULAR;  Surgeon: Jim Kwong MD;  Location: [facility] LAB;  Service: Cardiology;  Laterality: N/A;  NICM, CRT-D, SJM,, MAC, DM, 3 Prep*Wearing LifeVest*    oral extraction  2018    TESTICLE SURGERY       Family History   Problem Relation Age of Onset    Epilepsy Mother      Social History     Tobacco Use    Smoking status: Former     Current packs/day: 0.00     Average packs/day: 0.5 packs/day for 40.0 years (20.0 ttl pk-yrs)     Types: Cigarettes, Cigars     Start date:      Quit date:      Years since quittin.8     Passive exposure: Current    Smokeless tobacco: Never   Substance Use Topics    Alcohol use: Yes     Comment: once a month beer or liquor    Drug use: No     Review of Systems   Constitutional:  Negative for chills and fever.   HENT:  Negative for congestion, ear pain, rhinorrhea, sore throat and trouble swallowing.    Respiratory:  Positive for shortness of breath. Negative for cough.    Cardiovascular:  Positive for leg swelling. Negative for chest pain.   Gastrointestinal:  Positive for abdominal pain, diarrhea, nausea and vomiting. Negative for blood in stool.   Genitourinary:  Negative for dysuria, flank pain, frequency and hematuria.   Musculoskeletal:  Negative for neck pain and neck stiffness.   Neurological:  Negative for headaches.       Physical Exam     Initial Vitals   BP Pulse Resp Temp SpO2   11/10/23 1511 11/10/23 1511 11/10/23 1511 11/10/23 1516 11/10/23 1511   (!) 140/56 66 19 98 °F (36.7 °C) 100 %      MAP       --                Physical Exam    Nursing note and vitals reviewed.  Constitutional: He appears well-developed and well-nourished.   HENT:   Head: Normocephalic and atraumatic.   Right Ear: External ear normal.   Left Ear: External ear normal.   Neck:   Normal range of motion.  Cardiovascular:  Normal rate, regular rhythm,  normal heart sounds and intact distal pulses.     Exam reveals no gallop and no friction rub.       No murmur heard.  Pulmonary/Chest: No respiratory distress. He has no wheezes. He has no rhonchi. He has rales.   Abdominal: Abdomen is soft. Bowel sounds are normal. He exhibits no distension. There is no abdominal tenderness. There is no rebound and no guarding.   Musculoskeletal:         General: Normal range of motion.      Cervical back: Normal range of motion.     Neurological: He is alert and oriented to person, place, and time. GCS score is 15. GCS eye subscore is 4. GCS verbal subscore is 5. GCS motor subscore is 6.   Psychiatric: He has a normal mood and affect.         ED Course   Critical Care    Date/Time: 11/11/2023 2:14 PM    Performed by: Tammy Muse DO  Authorized by: Tammy Muse DO  Direct patient critical care time: 8 minutes  Additional history critical care time: 8 minutes  Ordering / reviewing critical care time: 8 minutes  Documentation critical care time: 8 minutes  Consulting other physicians critical care time: 8 minutes  Total critical care time (exclusive of procedural time) : 40 minutes  Critical care was necessary to treat or prevent imminent or life-threatening deterioration of the following conditions: cardiac failure and circulatory failure.  Critical care was time spent personally by me on the following activities: evaluation of patient's response to treatment, examination of patient, obtaining history from patient or surrogate, ordering and performing treatments and interventions, ordering and review of laboratory studies, ordering and review of radiographic studies, pulse oximetry, re-evaluation of patient's condition and review of old charts.  Comments: Medical decision-making.  Differential diagnosis includes but is not limited to STEMI, NSTEMI, cardiac arrhythmia, CHF, and CHF exacerbation.    I provided a face-to-face evaluation of the patient.  Nurse's notes reviewed, vital  signs reviewed  Labs ordered and reviewed  Radiology ordered and reviewed  Patient is agreeable to transfer and admission at [facility]  I agree with HPI and physical exam as documented.        Labs Reviewed   POCT CMP - Abnormal; Notable for the following components:       Result Value    AST (SGOT), POC 65 (*)     POC BUN 31 (*)     POC Creatinine 1.6 (*)     Bilirubin, POC 2.2 (*)     All other components within normal limits   POCT LIVER PANEL - Abnormal; Notable for the following components:    AST (SGOT), POC 56 (*)     Bilirubin, POC 4.1 (*)     All other components within normal limits   POCT B-TYPE NATRIURETIC PEPTIDE (BNP) - Abnormal; Notable for the following components:    POC B-Type Natriuretic Peptide 1270 (*)     All other components within normal limits   TROPONIN ISTAT   POCT CBC   POCT URINALYSIS(INSTRUMENT)   POCT CMP   POCT LIVER PANEL   POCT TROPONIN   POCT B-TYPE NATRIURETIC PEPTIDE (BNP)   POCT TROPONIN     EKG Readings: (Independently Interpreted)   Initial Reading: No STEMI.   paced rhythm with ventricular rate of 66, no STEMI, signed by Dr. Muse       Imaging Results              X-Ray Chest PA And Lateral (Final result)  Result time 11/10/23 17:33:49      Final result by Roberto Shrestha MD (11/10/23 17:33:49)                   Impression:      Stable cardiomegaly with mild pulmonary vascular congestion.      Electronically signed by: Roberto Shrestha MD  Date:    11/10/2023  Time:    17:33               Narrative:    EXAMINATION:  XR CHEST PA AND LATERAL    CLINICAL HISTORY:  Epigastric pain    TECHNIQUE:  PA and lateral views of the chest were performed.    COMPARISON:  04/02/2021.    FINDINGS:  There is stable appearance of a right-sided AICD.  The trachea is unremarkable.  There is stable enlargement cardiomediastinal silhouette.  There is no evidence of free air beneath the hemidiaphragms.  There are no pleural effusions.  There is no evidence of a pneumothorax.  There is  no evidence of pneumomediastinum.  No airspace opacity is present.  There is mild pulmonary vascular congestion.  There are degenerative changes in the osseous structures.                                       Medications   famotidine (PF) injection 20 mg (20 mg Intravenous Given 11/10/23 1714)   aluminum-magnesium hydroxide-simethicone 200-200-20 mg/5 mL suspension 30 mL (30 mLs Oral Given 11/10/23 1712)   furosemide injection 40 mg (40 mg Intravenous Given 11/10/23 1749)   furosemide injection 40 mg (40 mg Intravenous Given 11/10/23 1748)     Medical Decision Making  This is an emergent evaluation of a 68-year-old male with a past medical history of congestive heart failure, hypertension, hyperlipidemia who presents to the emergency department for sharp intermittent epigastric pain x 2 months.  Reports nausea, vomiting, diarrhea x 1 week. Patient reports his daughter made him come in for evaluation. Physical exam unremarkable. Regular rate rhythm without murmurs. Diffuse rales throughout lung fields.  Abdomen is soft, nontender, non distended, with normal bowel sounds.  Differential diagnosis includes but is not limited to GERD/gastritis, appendicitis, cholecystitis, pancreatitis, bowel obstruction, acute coronary syndrome, aortic dissection, pneumonia, CHF exacerbation.  Workup initiated with basic labs, cardiac enzymes, EKG, chest x-ray. Ordered GI cocktail and pepcid. CBC without leukocytosis and anemia.  CMP with AST of 65, BUN of 31, creatinine of 1.6, bilirubin of 2.2, no significant changes on labs from 2 years ago, no recent labs from since 2021.  BNP of 1270, this is suggestive of heart failure exacerbation.  Troponin within normal limits, no evidence for acute infarct.  EKG shows paced rhythm with ventricular rate of 66, no STEMI, signed by Dr. Muse.  Chest x-ray shows stable cardiomegaly with mild pulmonary vascular congestion.  Patient reports some improvement of symptoms after medications.  Serial  abdominal exam benign.  40 mg of Lasix ordered prior to labs and after labs by my attending physician Dr. Muse.  Patient will need to be admitted for heart failure exacerbation.  Patient meets criteria for observation.  I have placed consult to speak to observation provider.  I have spoken to Jim Oleary PA-C who agreed to accept the patient to the hospitalist service.  I have spoken to my attending physician, Dr. Muse, who has also seen and evaluated this patient and agrees with my assessment and plan. Admission orders placed.    Peter Cox PA-C    DISCLAIMER: This note was prepared with [employer] voice recognition transcription software. Garbled syntax, mangled pronouns, and other bizarre constructions may be attributed to that software system.     Amount and/or Complexity of Data Reviewed  Labs: ordered.  Radiology: ordered.    Risk  OTC drugs.  Prescription drug management.               ED Course as of 11/10/23 1855   Fri Nov 10, 2023   1730 EKG interpreted by Dr. Muse.  No STEMI.  Paced rhythm with ventricular rate of 66.  Left axis.  Abnormal EKG, QTC prolonged at 5:19 a.m..  When compared to previous EKG done on 08/18/2023 rate has increased by 1 beats per minute today [RF]      ED Course User Index  [RF] Tammy Muse DO                    Clinical Impression:   Final diagnoses:  [R10.13] Epigastric pain  [I50.9] CHF exacerbation        ED Disposition Condition    Observation Stable                Peter Cox PA-C  11/10/23 1855       Tammy Muse DO  11/11/23 [id number]

## 2023-11-11 PROBLEM — N17.9 ACUTE RENAL FAILURE SUPERIMPOSED ON STAGE 3A CHRONIC KIDNEY DISEASE: Status: ACTIVE | Noted: 2023-11-02

## 2023-11-11 PROBLEM — R79.1 ELEVATED INR: Status: ACTIVE | Noted: 2023-11-11

## 2023-11-11 PROBLEM — R11.2 NAUSEA & VOMITING: Status: ACTIVE | Noted: 2023-11-10

## 2023-11-11 PROBLEM — J43.9 EMPHYSEMA LUNG: Status: ACTIVE | Noted: 2021-12-09

## 2023-11-11 LAB
ALBUMIN SERPL BCP-MCNC: 3.2 G/DL (ref 3.5–5.2)
ALP SERPL-CCNC: 94 U/L (ref 55–135)
ALT SERPL W/O P-5'-P-CCNC: 47 U/L (ref 10–44)
ANION GAP SERPL CALC-SCNC: 16 MMOL/L (ref 8–16)
AST SERPL-CCNC: 53 U/L (ref 10–40)
BILIRUB DIRECT SERPL-MCNC: 3.3 MG/DL (ref 0.1–0.3)
BILIRUB DIRECT SERPL-MCNC: 3.4 MG/DL (ref 0.1–0.3)
BILIRUB SERPL-MCNC: 5.2 MG/DL (ref 0.1–1)
BUN SERPL-MCNC: 33 MG/DL (ref 8–23)
CALCIUM SERPL-MCNC: 9.6 MG/DL (ref 8.7–10.5)
CHLORIDE SERPL-SCNC: 100 MMOL/L (ref 95–110)
CO2 SERPL-SCNC: 22 MMOL/L (ref 23–29)
CREAT SERPL-MCNC: 1.8 MG/DL (ref 0.5–1.4)
EST. GFR  (NO RACE VARIABLE): 40 ML/MIN/1.73 M^2
ESTIMATED AVG GLUCOSE: 120 MG/DL (ref 68–131)
GLUCOSE SERPL-MCNC: 82 MG/DL (ref 70–110)
HAV IGM SERPL QL IA: NORMAL
HBA1C MFR BLD: 5.8 % (ref 4–5.6)
HBV CORE IGM SERPL QL IA: NORMAL
HBV SURFACE AG SERPL QL IA: NORMAL
HCV AB SERPL QL IA: NORMAL
INR PPP: 1.7 (ref 0.8–1.2)
LIPASE SERPL-CCNC: 13 U/L (ref 4–60)
POTASSIUM SERPL-SCNC: 4.1 MMOL/L (ref 3.5–5.1)
PROT SERPL-MCNC: 7.2 G/DL (ref 6–8.4)
PROTHROMBIN TIME: 17.2 SEC (ref 9–12.5)
SODIUM SERPL-SCNC: 138 MMOL/L (ref 136–145)
TROPONIN I SERPL DL<=0.01 NG/ML-MCNC: 0.01 NG/ML (ref 0–0.03)
TROPONIN I SERPL DL<=0.01 NG/ML-MCNC: 0.03 NG/ML (ref 0–0.03)

## 2023-11-11 PROCEDURE — 25000003 PHARM REV CODE 250: Performed by: HOSPITALIST

## 2023-11-11 PROCEDURE — 94761 N-INVAS EAR/PLS OXIMETRY MLT: CPT

## 2023-11-11 PROCEDURE — 80048 BASIC METABOLIC PNL TOTAL CA: CPT | Performed by: PHYSICIAN ASSISTANT

## 2023-11-11 PROCEDURE — 96375 TX/PRO/DX INJ NEW DRUG ADDON: CPT

## 2023-11-11 PROCEDURE — 96372 THER/PROPH/DIAG INJ SC/IM: CPT | Performed by: HOSPITALIST

## 2023-11-11 PROCEDURE — 27000221 HC OXYGEN, UP TO 24 HOURS

## 2023-11-11 PROCEDURE — 96376 TX/PRO/DX INJ SAME DRUG ADON: CPT

## 2023-11-11 PROCEDURE — G0378 HOSPITAL OBSERVATION PER HR: HCPCS

## 2023-11-11 PROCEDURE — 80076 HEPATIC FUNCTION PANEL: CPT | Performed by: PHYSICIAN ASSISTANT

## 2023-11-11 PROCEDURE — 85610 PROTHROMBIN TIME: CPT | Performed by: PHYSICIAN ASSISTANT

## 2023-11-11 PROCEDURE — C9113 INJ PANTOPRAZOLE SODIUM, VIA: HCPCS | Performed by: HOSPITALIST

## 2023-11-11 PROCEDURE — 83690 ASSAY OF LIPASE: CPT | Performed by: PHYSICIAN ASSISTANT

## 2023-11-11 PROCEDURE — 82248 BILIRUBIN DIRECT: CPT | Performed by: PHYSICIAN ASSISTANT

## 2023-11-11 PROCEDURE — 63600175 PHARM REV CODE 636 W HCPCS: Performed by: HOSPITALIST

## 2023-11-11 PROCEDURE — 84484 ASSAY OF TROPONIN QUANT: CPT | Performed by: PHYSICIAN ASSISTANT

## 2023-11-11 PROCEDURE — 80074 ACUTE HEPATITIS PANEL: CPT | Performed by: PHYSICIAN ASSISTANT

## 2023-11-11 PROCEDURE — 25000003 PHARM REV CODE 250: Performed by: PHYSICIAN ASSISTANT

## 2023-11-11 RX ORDER — PROCHLORPERAZINE EDISYLATE 5 MG/ML
5 INJECTION INTRAMUSCULAR; INTRAVENOUS EVERY 6 HOURS PRN
Status: DISCONTINUED | OUTPATIENT
Start: 2023-11-11 | End: 2023-11-15 | Stop reason: HOSPADM

## 2023-11-11 RX ORDER — HEPARIN SODIUM 5000 [USP'U]/ML
5000 INJECTION, SOLUTION INTRAVENOUS; SUBCUTANEOUS EVERY 8 HOURS
Status: DISCONTINUED | OUTPATIENT
Start: 2023-11-11 | End: 2023-11-15 | Stop reason: HOSPADM

## 2023-11-11 RX ORDER — ONDANSETRON 2 MG/ML
4 INJECTION INTRAMUSCULAR; INTRAVENOUS EVERY 6 HOURS PRN
Status: DISCONTINUED | OUTPATIENT
Start: 2023-11-11 | End: 2023-11-15 | Stop reason: HOSPADM

## 2023-11-11 RX ORDER — FUROSEMIDE 10 MG/ML
80 INJECTION INTRAMUSCULAR; INTRAVENOUS
Status: DISCONTINUED | OUTPATIENT
Start: 2023-11-11 | End: 2023-11-12

## 2023-11-11 RX ORDER — PRAVASTATIN SODIUM 40 MG/1
80 TABLET ORAL DAILY
Status: DISCONTINUED | OUTPATIENT
Start: 2023-11-11 | End: 2023-11-15 | Stop reason: HOSPADM

## 2023-11-11 RX ORDER — PANTOPRAZOLE SODIUM 40 MG/10ML
40 INJECTION, POWDER, LYOPHILIZED, FOR SOLUTION INTRAVENOUS 2 TIMES DAILY
Status: DISCONTINUED | OUTPATIENT
Start: 2023-11-11 | End: 2023-11-15 | Stop reason: HOSPADM

## 2023-11-11 RX ORDER — AMIODARONE HYDROCHLORIDE 200 MG/1
200 TABLET ORAL DAILY
Status: DISCONTINUED | OUTPATIENT
Start: 2023-11-11 | End: 2023-11-15 | Stop reason: HOSPADM

## 2023-11-11 RX ADMIN — HEPARIN SODIUM 5000 UNITS: 5000 INJECTION INTRAVENOUS; SUBCUTANEOUS at 01:11

## 2023-11-11 RX ADMIN — HEPARIN SODIUM 5000 UNITS: 5000 INJECTION INTRAVENOUS; SUBCUTANEOUS at 09:11

## 2023-11-11 RX ADMIN — PANTOPRAZOLE SODIUM 40 MG: 40 INJECTION, POWDER, FOR SOLUTION INTRAVENOUS at 09:11

## 2023-11-11 RX ADMIN — ASPIRIN 81 MG CHEWABLE TABLET 81 MG: 81 TABLET CHEWABLE at 09:11

## 2023-11-11 RX ADMIN — FUROSEMIDE 80 MG: 10 INJECTION, SOLUTION INTRAVENOUS at 08:11

## 2023-11-11 RX ADMIN — PRAVASTATIN SODIUM 80 MG: 40 TABLET ORAL at 10:11

## 2023-11-11 RX ADMIN — METOPROLOL SUCCINATE 50 MG: 50 TABLET, EXTENDED RELEASE ORAL at 09:11

## 2023-11-11 RX ADMIN — ONDANSETRON 4 MG: 2 INJECTION INTRAMUSCULAR; INTRAVENOUS at 09:11

## 2023-11-11 RX ADMIN — FUROSEMIDE 80 MG: 10 INJECTION, SOLUTION INTRAVENOUS at 09:11

## 2023-11-11 RX ADMIN — AMIODARONE HYDROCHLORIDE 200 MG: 200 TABLET ORAL at 10:11

## 2023-11-11 NOTE — CONSULTS
Food & Nutrition  Education    Diet Education: Low Salt Diet - Fluid Restriction    Learners: Papito Bhakta      Nutrition Education provided with handouts: Low Salt/ Fluid Restrictive Diet      Comments: RD consult for education fluid restriction low salt diet. RD working remotely. Educations attached to pt chart for in person follow up with onsite RD        Thanks!     Elaine Leon, Registration Eligible, Provisional LDN

## 2023-11-11 NOTE — ASSESSMENT & PLAN NOTE
Presents with SOB and lower extremity edema, BNP 1200, chest x-ray with pulmonary vascular congestion. He reports he began adding salt to his food due to concern that he would lose all of his salt due to diuretic    Patient is identified as having Combined Systolic and Diastolic heart failure that is Acute on chronic. CHF is currently uncontrolled due to Continued edema of extremities, Dyspnea not returned to baseline after 1 doses of IV diuretic and Pulmonary edema/pleural effusion on CXR. Latest ECHO performed and demonstrates- Results for orders placed during the hospital encounter of 08/18/23    Echo    Interpretation Summary    Left Ventricle: The left ventricle is severely dilated. Increased ventricular mass. Normal wall thickness. Global hypokinesis present. There is severely reduced systolic function with a visually estimated ejection fraction of 15 - 20%. There is diastolic dysfunction.    Left Atrium: Left atrium is severely dilated.    Right Ventricle: Severe right ventricular enlargement. Wall thickness is normal. Systolic function is moderately reduced. Pacemaker lead present in the ventricle.    Right Atrium: Right atrium is dilated.    Mitral Valve: Mild mitral annular calcification. There is moderate regurgitation.    Tricuspid Valve: There is mild regurgitation.    Pulmonic Valve: There is mild regurgitation.    Pulmonary Artery: The estimated pulmonary artery systolic pressure is 59 mmHg.    IVC/SVC: Elevated venous pressure at 15 mmHg.  . Continue Beta Blocker and Furosemide and monitor clinical status closely. Monitor on telemetry. Patient is on CHF pathway.  Monitor strict Is&Os and daily weights.  Place on fluid restriction of 1.5 L. Cardiology has not been any consulted. Continue to stress to patient importance of self efficacy and  on diet for CHF. Last BNP reviewed- and noted below   Recent Labs   Lab 11/10/23  2146   BNP 1,592*     - with RIGOBERTO on CKD3a- suspect cardiorenal  -  with elevated TBili- suspect hepatic congestion

## 2023-11-11 NOTE — HOSPITAL COURSE
Mr Papito Bhakta was placed in observation with acute on chronic systolic/diastolic CHF associated with cardiorenal syndrome/RIGOBERTO on CKD and congestive hepatopathy/elevated Tbili. Cause is increased dietary salt intake. Started IV lasix. Also with nausea, vomiting, with no abdominal pain/fever/diarrhea/hematemesis. Started PPI IV BID and antiemetics with improvement. Not diuresing well and renal/liver function worsening. Lactic up to 3.5, signifying developing cardiogenic shock. Cardiology following, increased diuretics. Renal function stable, Tbili downtrending.   11/14: dec Lasix iv to bid and d/c metolazone.   11/15: Now transitioned to Po lasix. Cards ok'd to d/c. Pt declines SNF or HH. Deemed stable to be d/c.

## 2023-11-11 NOTE — ASSESSMENT & PLAN NOTE
Per chart review history of CKD. Cr today of 1.6. baseline of 1.4 to 1.5.     Creatine stable for now. BMP reviewed- noted CrCl cannot be calculated (Patient's most recent lab result is older than the maximum 7 days allowed.). according to latest data. Based on current GFR, CKD stage is stage 3 - GFR 30-59.  Monitor UOP and serial BMP and adjust therapy as needed. Renally dose meds. Avoid nephrotoxic medications and procedures.

## 2023-11-11 NOTE — ASSESSMENT & PLAN NOTE
Patient was found to have thrombocytopenia  No active bleeding  With elevated TBili and elevated INR, concern for liver disease. Last CT 5/2023 showed hepatomegatly  The patient's platelet results have been reviewed and are listed below.  Recent Labs   Lab 11/10/23  2146   *   - check liver US  - monitor CBC

## 2023-11-11 NOTE — ASSESSMENT & PLAN NOTE
Per chart review history of CKD. Cr 1.9 on admit from baseline of 1.4 to 1.5.     Creatine stable for now. BMP reviewed- noted Estimated Creatinine Clearance: 57.5 mL/min (A) (based on SCr of 1.8 mg/dL (H)). according to latest data. Based on current GFR, CKD stage is stage 3 - GFR 30-59.  Monitor UOP and serial BMP and adjust therapy as needed. Renally dose meds. Avoid nephrotoxic medications and procedures.  - this is most likely cardiorenal  - continue IV lasix  - holding ARB and spironolactone  - CMP again in AM

## 2023-11-11 NOTE — ASSESSMENT & PLAN NOTE
Chronic, controlled. Latest blood pressure and vitals reviewed-     Temp:  [97.7 °F (36.5 °C)-98.9 °F (37.2 °C)]   Pulse:  [58-71]   Resp:  [18-30]   BP: (104-140)/(55-79)   SpO2:  [94 %-100 %] .   Home meds for hypertension were reviewed and noted below.   Hypertension Medications             furosemide (LASIX) 80 MG tablet TAKE 1 TABLET EVERY DAY    losartan (COZAAR) 25 MG tablet Take 25 mg by mouth once daily.    metoprolol succinate (TOPROL-XL) 50 MG 24 hr tablet TAKE 1 TABLET EVERY DAY    spironolactone (ALDACTONE) 25 MG tablet TAKE 1/2 TABLET (12.5 MG TOTAL) ONCE DAILY. HOLD IF SYSTOLIC BLOOD PRESSURE IS LESS THAN 110          While in the hospital, will manage blood pressure as follows; Continue home antihypertensive regimen    Will utilize p.r.n. blood pressure medication only if patient's blood pressure greater than 180/110 and he develops symptoms such as worsening chest pain or shortness of breath.

## 2023-11-11 NOTE — SUBJECTIVE & OBJECTIVE
Past Medical History:   Diagnosis Date    RIGOBERTO (acute kidney injury) 10/7/2020    Anticoagulant long-term use     Aspirin    CHF (congestive heart failure)     Hyperlipidemia     Hypertension     NICM (nonischemic cardiomyopathy) 6/16/2020    Obesity     AMELIA (obstructive sleep apnea) 1/7/2022    Peripheral vascular disease, unspecified 2/1/2021       Past Surgical History:   Procedure Laterality Date    INSERTION OF BIVENTRICULAR IMPLANTABLE CARDIOVERTER-DEFIBRILLATOR (ICD) N/A 02/13/2019    Procedure: INSERTION, ICD, BIVENTRICULAR;  Surgeon: Shailesh Eng MD;  Location: [facility] CATH LAB;  Service: Cardiology;  Laterality: N/A;  RN PRE OP 2-6-19  1ST CASE PER  RADHA. NOTIFIED RADHA THAT ANESTHESIA IS NOT PITO FOR 1ST CASE START-LO    INSERTION OF BIVENTRICULAR IMPLANTABLE CARDIOVERTER-DEFIBRILLATOR (ICD) N/A 09/28/2020    Procedure: INSERTION, ICD, BIVENTRICULAR;  Surgeon: Jim Kwong MD;  Location: [facility] EP LAB;  Service: Cardiology;  Laterality: N/A;  NICM, CRT-D, SJM,, MAC, DM, 3 Prep*Wearing LifeVest*    oral extraction  11/2018    TESTICLE SURGERY         Review of patient's allergies indicates:   Allergen Reactions    Ramipril      Leg swelling and gynecomastia     Losartan Rash       No current facility-administered medications on file prior to encounter.     Current Outpatient Medications on File Prior to Encounter   Medication Sig    acetaminophen (TYLENOL) 500 MG tablet Take 2 tablets (1,000 mg total) by mouth every 8 (eight) hours as needed for Pain or Temperature greater than (100.5).    amiodarone (PACERONE) 200 MG Tab Take 1 tablet (200 mg total) by mouth once daily.    aspirin (ECOTRIN) 325 MG EC tablet Take 1 tablet (325 mg total) by mouth once daily.    empagliflozin (JARDIANCE) 10 mg tablet Take 1 tablet (10 mg total) by mouth once daily.    furosemide (LASIX) 80 MG tablet TAKE 1 TABLET EVERY DAY    gabapentin (NEURONTIN) 100 MG capsule Take 1 capsule (100 mg total) by mouth 3 (three) times  daily. Take 100 mg by mouth 3 (three) times daily.    hydrocortisone 1 % cream Apply topically 2 (two) times daily.    losartan (COZAAR) 25 MG tablet Take 25 mg by mouth once daily.    meloxicam (MOBIC) 15 MG tablet Take 1 tablet (15 mg total) by mouth daily as needed for Pain.    methocarbamoL (ROBAXIN) 500 MG Tab Take 2 tablets (1,000 mg total) by mouth 3 (three) times daily as needed (Pain not improved by other medications).    metoprolol succinate (TOPROL-XL) 50 MG 24 hr tablet TAKE 1 TABLET EVERY DAY    potassium chloride (K-TAB) 20 mEq TAKE 1 TABLET EVERY DAY    pravastatin (PRAVACHOL) 80 MG tablet TAKE 1 TABLET EVERY DAY    spironolactone (ALDACTONE) 25 MG tablet TAKE 1/2 TABLET (12.5 MG TOTAL) ONCE DAILY. HOLD IF SYSTOLIC BLOOD PRESSURE IS LESS THAN 110    [DISCONTINUED] ramipril (ALTACE) 10 MG capsule Take 1 capsule (10 mg total) by mouth once daily.     Family History       Problem Relation (Age of Onset)    Epilepsy Mother          Tobacco Use    Smoking status: Former     Current packs/day: 0.00     Average packs/day: 0.5 packs/day for 40.0 years (20.0 ttl pk-yrs)     Types: Cigarettes, Cigars     Start date:      Quit date:      Years since quittin.8     Passive exposure: Current    Smokeless tobacco: Never   Substance and Sexual Activity    Alcohol use: Yes     Comment: once a month beer or liquor    Drug use: No    Sexual activity: Yes     Birth control/protection: None     Comment: uses protection sometimes     Review of Systems   Constitutional:  Negative for chills and fever.   HENT:  Negative for nosebleeds and tinnitus.    Eyes:  Negative for photophobia and visual disturbance.   Respiratory:  Positive for shortness of breath. Negative for wheezing.    Cardiovascular:  Positive for leg swelling. Negative for chest pain and palpitations.   Gastrointestinal:  Positive for nausea and vomiting. Negative for abdominal distention, abdominal pain and diarrhea.   Genitourinary:  Negative for  dysuria, flank pain and hematuria.   Musculoskeletal:  Negative for gait problem and joint swelling.   Skin:  Negative for rash and wound.   Neurological:  Negative for seizures and syncope.     Objective:     Vital Signs (Most Recent):  Temp: 97.7 °F (36.5 °C) (11/10/23 2047)  Pulse: 71 (11/10/23 2047)  Resp: 18 (11/10/23 2047)  BP: 107/71 (11/10/23 2047)  SpO2: 97 % (11/10/23 2047) Vital Signs (24h Range):  Temp:  [97.7 °F (36.5 °C)-98.9 °F (37.2 °C)] 97.7 °F (36.5 °C)  Pulse:  [58-71] 71  Resp:  [18-30] 18  SpO2:  [94 %-100 %] 97 %  BP: (104-140)/(55-79) 107/71     Weight: 125 kg (275 lb 9.2 oz)  Body mass index is 32.68 kg/m².     Physical Exam  Vitals and nursing note reviewed.   Constitutional:       General: He is not in acute distress.     Appearance: He is well-developed. He is not diaphoretic.   HENT:      Head: Normocephalic and atraumatic.      Right Ear: External ear normal.      Left Ear: External ear normal.   Eyes:      General:         Right eye: No discharge.         Left eye: No discharge.      Conjunctiva/sclera: Conjunctivae normal.   Neck:      Thyroid: No thyromegaly.   Cardiovascular:      Rate and Rhythm: Normal rate and regular rhythm.      Heart sounds: No murmur heard.  Pulmonary:      Effort: Pulmonary effort is normal. No respiratory distress.      Breath sounds: Rales present.      Comments: On supplemental oxygen speaking in full sentences  Abdominal:      General: Bowel sounds are normal. There is no distension.      Palpations: Abdomen is soft. There is no mass.      Tenderness: There is no abdominal tenderness.   Musculoskeletal:         General: No deformity.      Cervical back: Normal range of motion and neck supple.      Right lower leg: Edema present.      Left lower leg: Edema present.      Comments: Symmetric bilateral lower extremity edema to above knees   Skin:     General: Skin is warm and dry.   Neurological:      Mental Status: He is alert and oriented to person, place,  and time.      Sensory: No sensory deficit.   Psychiatric:         Behavior: Behavior normal.                Significant Labs:   CBC  WBC 5.1  H&H: 12&40  PLTs: 168    CMP  Sodium: 139  Potassium: 5.0  Chloride: 100  BUN: 31  Cr: 1.6   Alk Phos: 89  Albumin: 3.6  AST/ALT: 56/35  GGT: 39  Amylase: 46  Glucose: 100  Troponin: 0.01  BNP: 1270  Calcium: 9.6    Significant Imaging:   Imaging Results              X-Ray Chest PA And Lateral (Final result)  Result time 11/10/23 17:33:49      Final result by Roberto Shrestha MD (11/10/23 17:33:49)                   Impression:      Stable cardiomegaly with mild pulmonary vascular congestion.      Electronically signed by: Roberto Shrestha MD  Date:    11/10/2023  Time:    17:33               Narrative:    EXAMINATION:  XR CHEST PA AND LATERAL    CLINICAL HISTORY:  Epigastric pain    TECHNIQUE:  PA and lateral views of the chest were performed.    COMPARISON:  04/02/2021.    FINDINGS:  There is stable appearance of a right-sided AICD.  The trachea is unremarkable.  There is stable enlargement cardiomediastinal silhouette.  There is no evidence of free air beneath the hemidiaphragms.  There are no pleural effusions.  There is no evidence of a pneumothorax.  There is no evidence of pneumomediastinum.  No airspace opacity is present.  There is mild pulmonary vascular congestion.  There are degenerative changes in the osseous structures.

## 2023-11-11 NOTE — ASSESSMENT & PLAN NOTE
Body mass index is 32.68 kg/m². Morbid obesity complicates all aspects of disease management from diagnostic modalities to treatment. Weight loss encouraged and health benefits explained to patient.

## 2023-11-11 NOTE — CONSULTS
Food & Nutrition  Education    Diet Education: Low Salt Diet - Fluid Restriction    Learners: Papito Bhakta       Nutrition Education provided with handouts: Low Salt/ Fluid Restrictive Diet      Comments: RD consult for education on fluid restriction low salt diet. Pt with other care at time of re-attempt. Educations attached to pt chart for in person follow up with onsite RD    11/13/23 Pt educated.    Thanks!     Elaine Leon, Registration Eligible, Provisional LDN

## 2023-11-11 NOTE — ED NOTES
Attempted to call report to RN assuming care of pt; was told she is in a room with a pt and will call back for report when she is done

## 2023-11-11 NOTE — ASSESSMENT & PLAN NOTE
Presents with intermittent nausea with vomiting for 2 weeks. TBili elevated at 4.9 on admit, direct predominance. With elevated INR and low plts.   - active vomiting on 11/11 AM with trying to eat grits-- clear liquids  - PPI IV BID  - PRN antiemetics  - check abdominal US- cirrhosis? Ascites?  - if not improving, will consult GI

## 2023-11-11 NOTE — ED NOTES
RN assuming care of pt at [facility] called to get report which was given in sbar format

## 2023-11-11 NOTE — NURSING
[facility]  Nurses Note -- 4 Eyes      11/10/2023       Skin assessed on: Admit      [x] No Pressure Injuries Present    [x]Prevention Measures Documented    [] Yes LDA  for Pressure Injury Previously documented     [] Yes New Pressure Injury Discovered   [] LDA for New Pressure Injury Added      Attending RN:  Kishore Morris RN     Second RN:  Melnoie EDMONDS RN

## 2023-11-11 NOTE — ASSESSMENT & PLAN NOTE
Presents with SOB and lower extremity edema, BNP 1200, chest x-ray with pulmonary vascular congestion. He reports he began adding salt to his food due to concern that he would lose all of his salt due to diuretic  Continue metoprolol  Started on 60mg IV lasix  Previous allergies to ACE and ARB outpt. Entresto price prohibitive in past  Potassium 5.0 in [place] will repeat CMP prior to resuming.         Patient is identified as having Combined Systolic and Diastolic heart failure that is Acute on chronic. CHF is currently uncontrolled due to Continued edema of extremities, Dyspnea not returned to baseline after 1 doses of IV diuretic and Pulmonary edema/pleural effusion on CXR. Latest ECHO performed and demonstrates- Results for orders placed during the hospital encounter of 08/18/23    Echo    Interpretation Summary    Left Ventricle: The left ventricle is severely dilated. Increased ventricular mass. Normal wall thickness. Global hypokinesis present. There is severely reduced systolic function with a visually estimated ejection fraction of 15 - 20%. There is diastolic dysfunction.    Left Atrium: Left atrium is severely dilated.    Right Ventricle: Severe right ventricular enlargement. Wall thickness is normal. Systolic function is moderately reduced. Pacemaker lead present in the ventricle.    Right Atrium: Right atrium is dilated.    Mitral Valve: Mild mitral annular calcification. There is moderate regurgitation.    Tricuspid Valve: There is mild regurgitation.    Pulmonic Valve: There is mild regurgitation.    Pulmonary Artery: The estimated pulmonary artery systolic pressure is 59 mmHg.    IVC/SVC: Elevated venous pressure at 15 mmHg.  . Continue Beta Blocker and Furosemide and monitor clinical status closely. Monitor on telemetry. Patient is on CHF pathway.  Monitor strict Is&Os and daily weights.  Place on fluid restriction of 1.5 L. Cardiology has not been any consulted. Continue to stress to  "patient importance of self efficacy and  on diet for CHF. Last BNP reviewed- and noted below No results for input(s): "BNP", "BNPTRIAGEBLO" in the last 168 hours..  "

## 2023-11-11 NOTE — ASSESSMENT & PLAN NOTE
Chronic, controlled. Latest blood pressure and vitals reviewed-     Temp:  [97.7 °F (36.5 °C)-98.9 °F (37.2 °C)]   Pulse:  [58-71]   Resp:  [18-30]   BP: (104-140)/(55-79)   SpO2:  [94 %-100 %] .   Home meds for hypertension were reviewed and noted below.   Hypertension Medications             furosemide (LASIX) 80 MG tablet TAKE 1 TABLET EVERY DAY    losartan (COZAAR) 25 MG tablet Take 25 mg by mouth once daily.    metoprolol succinate (TOPROL-XL) 50 MG 24 hr tablet TAKE 1 TABLET EVERY DAY    spironolactone (ALDACTONE) 25 MG tablet TAKE 1/2 TABLET (12.5 MG TOTAL) ONCE DAILY. HOLD IF SYSTOLIC BLOOD PRESSURE IS LESS THAN 110        - continue home metoprolol  - hold losartan and spironolactone in setting of RIGOBERTO on CKD  - changed PO lasix to IV for CHF exacerbation    Will utilize p.r.n. blood pressure medication only if patient's blood pressure greater than 180/110 and he develops symptoms such as worsening chest pain or shortness of breath.

## 2023-11-11 NOTE — HPI
Papito Bhakta 68 y.o. male with HTN, HLD, CHF S/P AICD, on amiodarone for VT prevention since the hospital multiple complaints including shortness of breath lower extremity edema and painless nausea and vomiting.  Regarding his shortness breath lower extremity edema in the symptoms have been progressive for the last week.  He reports compliance with his home Lasix of 80 mg.  He reports he began to add salt to his food as he was concerned that he would lose all of his salt due to diuretic use.  He is also had painless nausea and vomiting that occur as occurred intermittently for the last 2 weeks.  Reports a grandson had similar symptoms.  There has been no pain with vomiting.  There has been no blood in the emesis.  He is not currently nauseated.    In the ED, afebrile without leukocytosis chest x-ray with cardiomegaly mild pulmonary vascular congestion troponin negative BNP 1270 creatinine 1.6 point of care bilirubin 4.1.

## 2023-11-11 NOTE — SUBJECTIVE & OBJECTIVE
Interval History: Trying to eat grits, then developed vomiting while I was in the room. No hematemesis. No abdominal pain. No diarrhea. Still has shortness of breath and leg swelling.     Review of Systems   Constitutional:  Negative for chills and fever.   Respiratory:  Positive for shortness of breath. Negative for cough, chest tightness and wheezing.    Cardiovascular:  Positive for leg swelling. Negative for chest pain and palpitations.   Gastrointestinal:  Positive for nausea and vomiting. Negative for abdominal distention, abdominal pain, blood in stool, constipation and diarrhea.   Genitourinary:  Negative for difficulty urinating.   Psychiatric/Behavioral:  Negative for confusion.      Objective:     Vital Signs (Most Recent):  Temp: 97.9 °F (36.6 °C) (11/11/23 0718)  Pulse: 66 (11/11/23 0718)  Resp: 20 (11/11/23 0718)  BP: 117/73 (11/11/23 0718)  SpO2: 99 % (11/11/23 0836) Vital Signs (24h Range):  Temp:  [97.7 °F (36.5 °C)-98.9 °F (37.2 °C)] 97.9 °F (36.6 °C)  Pulse:  [58-71] 66  Resp:  [18-30] 20  SpO2:  [94 %-100 %] 99 %  BP: (104-140)/(55-79) 117/73     Weight: 125 kg (275 lb 9.2 oz)  Body mass index is 32.68 kg/m².    Intake/Output Summary (Last 24 hours) at 11/11/2023 0904  Last data filed at 11/11/2023 0902  Gross per 24 hour   Intake 0 ml   Output 800 ml   Net -800 ml         Physical Exam  Vitals and nursing note reviewed.   Constitutional:       General: He is not in acute distress.     Appearance: He is obese. He is ill-appearing. He is not toxic-appearing.   HENT:      Head: Normocephalic and atraumatic.      Nose: Nose normal.      Mouth/Throat:      Mouth: Mucous membranes are moist.   Eyes:      General: Scleral icterus present.   Cardiovascular:      Rate and Rhythm: Normal rate and regular rhythm.   Pulmonary:      Effort: Pulmonary effort is normal.      Breath sounds: Wheezing present.      Comments: 3L NC  Abdominal:      General: Bowel sounds are normal. There is no distension.       Palpations: Abdomen is soft. There is no mass.      Tenderness: There is no abdominal tenderness. There is no guarding or rebound.   Musculoskeletal:      Right lower leg: Edema present.      Left lower leg: Edema present.   Skin:     General: Skin is warm and dry.   Neurological:      Mental Status: He is alert. Mental status is at baseline.             Significant Labs: All pertinent labs within the past 24 hours have been reviewed.    Significant Imaging: I have reviewed all pertinent imaging results/findings within the past 24 hours.

## 2023-11-11 NOTE — PLAN OF CARE
Case Management Assessment     PCP: Brynn To MD  Pharmacy: [facility] by mail     Patient Arrived From: Home  Existing Help at Home: Annie Bhakta (daughter)    Barriers to Discharge: None    Discharge Plan:    A. Home   B. Home      SW completed brief assessment with patient at bedside. Preferred pharmacy is thru [facility] by mail order. Patient is not on dialysis. Patient says that he thinks he does take a blood thinner but cant remember the name. Patient uses crutches at this time for ambulation assistance. Patient lives at home with his daughter and grandson who will pick him up at discharge.           11/11/23 1014   Discharge Planning   Assessment Type Discharge Planning Brief Assessment   Resource/Environmental Concerns none   Support Systems Children   Equipment Currently Used at Home crutches   Current Living Arrangements home   Patient/Family Anticipates Transition to home with family   Patient/Family Anticipated Services at Transition none   DME Needed Upon Discharge  none   Discharge Plan A Home with family   Discharge Plan B Home

## 2023-11-11 NOTE — ASSESSMENT & PLAN NOTE
Lab Results   Component Value Date    INR 1.7 (H) 11/11/2023    INR 1.0 04/02/2021    INR 1.0 03/31/2021     - with elevated TBili and low plts, concern for liver disease  - check abdominal US  - no signs of bleeding

## 2023-11-11 NOTE — ASSESSMENT & PLAN NOTE
Stable no acute issues, he denies discharge. QTc chronically prolonged. Continue home amiodarone for NSVT prevention. Monitor on telemetry

## 2023-11-11 NOTE — H&P
[facility] Medicine  History & Physical    Patient Name: Papito Bhakta  MRN: [id number]  Patient Class: OP- Observation  Admission Date: 11/10/2023  Attending Physician: Jayden Gray MD   Primary Care Provider: Brynn To MD         Patient information was obtained from patient, past medical records and ER records.     Subjective:     Principal Problem:Acute on chronic combined systolic and diastolic CHF (congestive heart failure)    Chief Complaint:   Chief Complaint   Patient presents with    Abdominal Pain     Pt has been having N/V/D without abd pain for 3 weeks.         HPI: Papito Bhakta 68 y.o. male with HTN, HLD, CHF S/P AICD, on amiodarone for VT prevention since the hospital multiple complaints including shortness of breath lower extremity edema and painless nausea and vomiting.  Regarding his shortness breath lower extremity edema in the symptoms have been progressive for the last week.  He reports compliance with his home Lasix of 80 mg.  He reports he began to add salt to his food as he was concerned that he would lose all of his salt due to diuretic use.  He is also had painless nausea and vomiting that occur as occurred intermittently for the last 2 weeks.  Reports a grandson had similar symptoms.  There has been no pain with vomiting.  There has been no blood in the emesis.  He is not currently nauseated.    In the ED, afebrile without leukocytosis chest x-ray with cardiomegaly mild pulmonary vascular congestion troponin negative BNP 1270 creatinine 1.6 point of care bilirubin 4.1.      Past Medical History:   Diagnosis Date    RIGOBERTO (acute kidney injury) 10/7/2020    Anticoagulant long-term use     Aspirin    CHF (congestive heart failure)     Hyperlipidemia     Hypertension     NICM (nonischemic cardiomyopathy) 6/16/2020    Obesity     AMELIA (obstructive sleep apnea) 1/7/2022    Peripheral vascular disease, unspecified 2/1/2021       Past Surgical History:   Procedure  Laterality Date    INSERTION OF BIVENTRICULAR IMPLANTABLE CARDIOVERTER-DEFIBRILLATOR (ICD) N/A 02/13/2019    Procedure: INSERTION, ICD, BIVENTRICULAR;  Surgeon: Shailesh Eng MD;  Location: [facility] CATH LAB;  Service: Cardiology;  Laterality: N/A;  RN PRE OP 2-6-19  1ST CASE PER  RADHA. NOTIFIED RADHA THAT ANESTHESIA IS NOT PITO FOR 1ST CASE START-LO    INSERTION OF BIVENTRICULAR IMPLANTABLE CARDIOVERTER-DEFIBRILLATOR (ICD) N/A 09/28/2020    Procedure: INSERTION, ICD, BIVENTRICULAR;  Surgeon: Jim Kwong MD;  Location: [facility] EP LAB;  Service: Cardiology;  Laterality: N/A;  NICM, CRT-D, SJM,, MAC, DM, 3 Prep*Wearing LifeVest*    oral extraction  11/2018    TESTICLE SURGERY         Review of patient's allergies indicates:   Allergen Reactions    Ramipril      Leg swelling and gynecomastia     Losartan Rash       No current facility-administered medications on file prior to encounter.     Current Outpatient Medications on File Prior to Encounter   Medication Sig    acetaminophen (TYLENOL) 500 MG tablet Take 2 tablets (1,000 mg total) by mouth every 8 (eight) hours as needed for Pain or Temperature greater than (100.5).    amiodarone (PACERONE) 200 MG Tab Take 1 tablet (200 mg total) by mouth once daily.    aspirin (ECOTRIN) 325 MG EC tablet Take 1 tablet (325 mg total) by mouth once daily.    empagliflozin (JARDIANCE) 10 mg tablet Take 1 tablet (10 mg total) by mouth once daily.    furosemide (LASIX) 80 MG tablet TAKE 1 TABLET EVERY DAY    gabapentin (NEURONTIN) 100 MG capsule Take 1 capsule (100 mg total) by mouth 3 (three) times daily. Take 100 mg by mouth 3 (three) times daily.    hydrocortisone 1 % cream Apply topically 2 (two) times daily.    losartan (COZAAR) 25 MG tablet Take 25 mg by mouth once daily.    meloxicam (MOBIC) 15 MG tablet Take 1 tablet (15 mg total) by mouth daily as needed for Pain.    methocarbamoL (ROBAXIN) 500 MG Tab Take 2 tablets (1,000 mg total) by mouth 3 (three) times daily as needed  (Pain not improved by other medications).    metoprolol succinate (TOPROL-XL) 50 MG 24 hr tablet TAKE 1 TABLET EVERY DAY    potassium chloride (K-TAB) 20 mEq TAKE 1 TABLET EVERY DAY    pravastatin (PRAVACHOL) 80 MG tablet TAKE 1 TABLET EVERY DAY    spironolactone (ALDACTONE) 25 MG tablet TAKE 1/2 TABLET (12.5 MG TOTAL) ONCE DAILY. HOLD IF SYSTOLIC BLOOD PRESSURE IS LESS THAN 110    [DISCONTINUED] ramipril (ALTACE) 10 MG capsule Take 1 capsule (10 mg total) by mouth once daily.     Family History       Problem Relation (Age of Onset)    Epilepsy Mother          Tobacco Use    Smoking status: Former     Current packs/day: 0.00     Average packs/day: 0.5 packs/day for 40.0 years (20.0 ttl pk-yrs)     Types: Cigarettes, Cigars     Start date:      Quit date:      Years since quittin.8     Passive exposure: Current    Smokeless tobacco: Never   Substance and Sexual Activity    Alcohol use: Yes     Comment: once a month beer or liquor    Drug use: No    Sexual activity: Yes     Birth control/protection: None     Comment: uses protection sometimes     Review of Systems   Constitutional:  Negative for chills and fever.   HENT:  Negative for nosebleeds and tinnitus.    Eyes:  Negative for photophobia and visual disturbance.   Respiratory:  Positive for shortness of breath. Negative for wheezing.    Cardiovascular:  Positive for leg swelling. Negative for chest pain and palpitations.   Gastrointestinal:  Positive for nausea and vomiting. Negative for abdominal distention, abdominal pain and diarrhea.   Genitourinary:  Negative for dysuria, flank pain and hematuria.   Musculoskeletal:  Negative for gait problem and joint swelling.   Skin:  Negative for rash and wound.   Neurological:  Negative for seizures and syncope.     Objective:     Vital Signs (Most Recent):  Temp: 97.7 °F (36.5 °C) (11/10/23 2047)  Pulse: 71 (11/10/23 2047)  Resp: 18 (11/10/23 2047)  BP: 107/71 (11/10/23 2047)  SpO2: 97 % (11/10/23 2047)  Vital Signs (24h Range):  Temp:  [97.7 °F (36.5 °C)-98.9 °F (37.2 °C)] 97.7 °F (36.5 °C)  Pulse:  [58-71] 71  Resp:  [18-30] 18  SpO2:  [94 %-100 %] 97 %  BP: (104-140)/(55-79) 107/71     Weight: 125 kg (275 lb 9.2 oz)  Body mass index is 32.68 kg/m².     Physical Exam  Vitals and nursing note reviewed.   Constitutional:       General: He is not in acute distress.     Appearance: He is well-developed. He is not diaphoretic.   HENT:      Head: Normocephalic and atraumatic.      Right Ear: External ear normal.      Left Ear: External ear normal.   Eyes:      General:         Right eye: No discharge.         Left eye: No discharge.      Conjunctiva/sclera: Conjunctivae normal.   Neck:      Thyroid: No thyromegaly.   Cardiovascular:      Rate and Rhythm: Normal rate and regular rhythm.      Heart sounds: No murmur heard.  Pulmonary:      Effort: Pulmonary effort is normal. No respiratory distress.      Breath sounds: Rales present.      Comments: On supplemental oxygen speaking in full sentences  Abdominal:      General: Bowel sounds are normal. There is no distension.      Palpations: Abdomen is soft. There is no mass.      Tenderness: There is no abdominal tenderness.   Musculoskeletal:         General: No deformity.      Cervical back: Normal range of motion and neck supple.      Right lower leg: Edema present.      Left lower leg: Edema present.      Comments: Symmetric bilateral lower extremity edema to above knees   Skin:     General: Skin is warm and dry.   Neurological:      Mental Status: He is alert and oriented to person, place, and time.      Sensory: No sensory deficit.   Psychiatric:         Behavior: Behavior normal.                Significant Labs:   CBC  WBC 5.1  H&H: 12&40  PLTs: 168    CMP  Sodium: 139  Potassium: 5.0  Chloride: 100  BUN: 31  Cr: 1.6   Alk Phos: 89  Albumin: 3.6  AST/ALT: 56/35  GGT: 39  Amylase: 46  Glucose: 100  Troponin: 0.01  BNP: 1270  Calcium: 9.6    Significant Imaging:    Imaging Results              X-Ray Chest PA And Lateral (Final result)  Result time 11/10/23 17:33:49      Final result by Roberto Shrestha MD (11/10/23 17:33:49)                   Impression:      Stable cardiomegaly with mild pulmonary vascular congestion.      Electronically signed by: Roberto Shrestha MD  Date:    11/10/2023  Time:    17:33               Narrative:    EXAMINATION:  XR CHEST PA AND LATERAL    CLINICAL HISTORY:  Epigastric pain    TECHNIQUE:  PA and lateral views of the chest were performed.    COMPARISON:  04/02/2021.    FINDINGS:  There is stable appearance of a right-sided AICD.  The trachea is unremarkable.  There is stable enlargement cardiomediastinal silhouette.  There is no evidence of free air beneath the hemidiaphragms.  There are no pleural effusions.  There is no evidence of a pneumothorax.  There is no evidence of pneumomediastinum.  No airspace opacity is present.  There is mild pulmonary vascular congestion.  There are degenerative changes in the osseous structures.                                        Assessment/Plan:     * Acute on chronic combined systolic and diastolic CHF (congestive heart failure)  Presents with SOB and lower extremity edema, BNP 1200, chest x-ray with pulmonary vascular congestion. He reports he began adding salt to his food due to concern that he would lose all of his salt due to diuretic  Continue metoprolol  Started on 60mg IV lasix  Previous allergies to ACE and ARB outpt. Entresto price prohibitive in past  Potassium 5.0 in [place] will repeat CMP prior to resuming.         Patient is identified as having Combined Systolic and Diastolic heart failure that is Acute on chronic. CHF is currently uncontrolled due to Continued edema of extremities, Dyspnea not returned to baseline after 1 doses of IV diuretic and Pulmonary edema/pleural effusion on CXR. Latest ECHO performed and demonstrates- Results for orders placed during the hospital encounter of  "08/18/23    Echo    Interpretation Summary    Left Ventricle: The left ventricle is severely dilated. Increased ventricular mass. Normal wall thickness. Global hypokinesis present. There is severely reduced systolic function with a visually estimated ejection fraction of 15 - 20%. There is diastolic dysfunction.    Left Atrium: Left atrium is severely dilated.    Right Ventricle: Severe right ventricular enlargement. Wall thickness is normal. Systolic function is moderately reduced. Pacemaker lead present in the ventricle.    Right Atrium: Right atrium is dilated.    Mitral Valve: Mild mitral annular calcification. There is moderate regurgitation.    Tricuspid Valve: There is mild regurgitation.    Pulmonic Valve: There is mild regurgitation.    Pulmonary Artery: The estimated pulmonary artery systolic pressure is 59 mmHg.    IVC/SVC: Elevated venous pressure at 15 mmHg.  . Continue Beta Blocker and Furosemide and monitor clinical status closely. Monitor on telemetry. Patient is on CHF pathway.  Monitor strict Is&Os and daily weights.  Place on fluid restriction of 1.5 L. Cardiology has not been any consulted. Continue to stress to patient importance of self efficacy and  on diet for CHF. Last BNP reviewed- and noted below No results for input(s): "BNP", "BNPTRIAGEBLO" in the last 168 hours..    Nausea  Presents with intermittent nausea with vomiting for 2 weeks. LFTs within normal limits in ED, though POC bilirubin 4.1  Will check Lipase, formal CMP and US abdomen limited  He denies nausea now and requests diet  ADDENDUM: CMP returned with elevation in bilirubin similar to [facility] labs. Will check INR, hepatitis panel, tylenol. LFTs minimally elevated, will hold amiodarone. Lipase pending. Fractionate bilirubin    Stage 3a chronic kidney disease  Per chart review history of CKD. Cr today of 1.6. baseline of 1.4 to 1.5.     Creatine stable for now. BMP reviewed- noted CrCl cannot be calculated (Patient's most " recent lab result is older than the maximum 7 days allowed.). according to latest data. Based on current GFR, CKD stage is stage 3 - GFR 30-59.  Monitor UOP and serial BMP and adjust therapy as needed. Renally dose meds. Avoid nephrotoxic medications and procedures.    AMELIA (obstructive sleep apnea)  CPAP nightly    NICM (nonischemic cardiomyopathy)  As above    Biventricular ICD (implantable cardioverter-defibrillator) in place  Stable no acute issues, he denies discharge. QTc chronically prolonged. Continue home amiodarone for NSVT prevention. Monitor on telemetry    Hyperlipidemia  continue home statin    Essential hypertension  Chronic, controlled. Latest blood pressure and vitals reviewed-     Temp:  [97.7 °F (36.5 °C)-98.9 °F (37.2 °C)]   Pulse:  [58-71]   Resp:  [18-30]   BP: (104-140)/(55-79)   SpO2:  [94 %-100 %] .   Home meds for hypertension were reviewed and noted below.   Hypertension Medications               furosemide (LASIX) 80 MG tablet TAKE 1 TABLET EVERY DAY    losartan (COZAAR) 25 MG tablet Take 25 mg by mouth once daily.    metoprolol succinate (TOPROL-XL) 50 MG 24 hr tablet TAKE 1 TABLET EVERY DAY    spironolactone (ALDACTONE) 25 MG tablet TAKE 1/2 TABLET (12.5 MG TOTAL) ONCE DAILY. HOLD IF SYSTOLIC BLOOD PRESSURE IS LESS THAN 110            While in the hospital, will manage blood pressure as follows; Continue home antihypertensive regimen    Will utilize p.r.n. blood pressure medication only if patient's blood pressure greater than 180/110 and he develops symptoms such as worsening chest pain or shortness of breath.      VTE Risk Mitigation (From admission, onward)           Ordered     IP VTE HIGH RISK PATIENT  Once         11/10/23 2117     Place sequential compression device  Until discontinued         11/10/23 2117                   Discussed with ED physician.    VTE: SCD  Code: Full   Diet: cardiac  Dispo: pending diuresis      On 11/10/2023, patient should be placed in hospital  observation services under my care in collaboration with Jayden thomason MD.      MITCHELL PaizC  Department of Hospital Medicine  [facility] - Med Surg    N/A  Family History   Problem Relation Age of Onset    Epilepsy Mother      Pertinent information:

## 2023-11-11 NOTE — PROGRESS NOTES
[facility] Medicine  Progress Note    Patient Name: Papito Bhakta  MRN: [id number]  Patient Class: OP- Observation   Admission Date: 11/10/2023  Length of Stay: 0 days  Attending Physician: Kelin Kennedy MD  Primary Care Provider: Brynn To MD        Subjective:     Principal Problem:Acute on chronic combined systolic and diastolic CHF (congestive heart failure)        HPI:  Papito Bhakta 68 y.o. male with HTN, HLD, CHF S/P AICD, on amiodarone for VT prevention since the hospital multiple complaints including shortness of breath lower extremity edema and painless nausea and vomiting.  Regarding his shortness breath lower extremity edema in the symptoms have been progressive for the last week.  He reports compliance with his home Lasix of 80 mg.  He reports he began to add salt to his food as he was concerned that he would lose all of his salt due to diuretic use.  He is also had painless nausea and vomiting that occur as occurred intermittently for the last 2 weeks.  Reports a grandson had similar symptoms.  There has been no pain with vomiting.  There has been no blood in the emesis.  He is not currently nauseated.    In the ED, afebrile without leukocytosis chest x-ray with cardiomegaly mild pulmonary vascular congestion troponin negative BNP 1270 creatinine 1.6 point of care bilirubin 4.1.      Overview/Hospital Course:  Mr Papito Bhakta was placed in observation with acute on chronic systolic/diastolic CHF associated with RIGOBERTO on CKD and elevated Tbili. Cause is dietary salt intake. Started IV lasix. Also with nausea, vomiting, with no abdominal pain/fever/diarrhea/hematemesis. Started PPI IV BID and antiemetics.       Interval History: Trying to eat grits, then developed vomiting while I was in the room. No hematemesis. No abdominal pain. No diarrhea. Still has shortness of breath and leg swelling.     Review of Systems   Constitutional:  Negative for chills and fever.    Respiratory:  Positive for shortness of breath. Negative for cough, chest tightness and wheezing.    Cardiovascular:  Positive for leg swelling. Negative for chest pain and palpitations.   Gastrointestinal:  Positive for nausea and vomiting. Negative for abdominal distention, abdominal pain, blood in stool, constipation and diarrhea.   Genitourinary:  Negative for difficulty urinating.   Psychiatric/Behavioral:  Negative for confusion.      Objective:     Vital Signs (Most Recent):  Temp: 97.9 °F (36.6 °C) (11/11/23 0718)  Pulse: 66 (11/11/23 0718)  Resp: 20 (11/11/23 0718)  BP: 117/73 (11/11/23 0718)  SpO2: 99 % (11/11/23 0836) Vital Signs (24h Range):  Temp:  [97.7 °F (36.5 °C)-98.9 °F (37.2 °C)] 97.9 °F (36.6 °C)  Pulse:  [58-71] 66  Resp:  [18-30] 20  SpO2:  [94 %-100 %] 99 %  BP: (104-140)/(55-79) 117/73     Weight: 125 kg (275 lb 9.2 oz)  Body mass index is 32.68 kg/m².    Intake/Output Summary (Last 24 hours) at 11/11/2023 0904  Last data filed at 11/11/2023 0902  Gross per 24 hour   Intake 0 ml   Output 800 ml   Net -800 ml         Physical Exam  Vitals and nursing note reviewed.   Constitutional:       General: He is not in acute distress.     Appearance: He is obese. He is ill-appearing. He is not toxic-appearing.   HENT:      Head: Normocephalic and atraumatic.      Nose: Nose normal.      Mouth/Throat:      Mouth: Mucous membranes are moist.   Eyes:      General: Scleral icterus present.   Cardiovascular:      Rate and Rhythm: Normal rate and regular rhythm.   Pulmonary:      Effort: Pulmonary effort is normal.      Breath sounds: Wheezing present.      Comments: 3L NC  Abdominal:      General: Bowel sounds are normal. There is no distension.      Palpations: Abdomen is soft. There is no mass.      Tenderness: There is no abdominal tenderness. There is no guarding or rebound.   Musculoskeletal:      Right lower leg: Edema present.      Left lower leg: Edema present.   Skin:     General: Skin is warm  and dry.   Neurological:      Mental Status: He is alert. Mental status is at baseline.             Significant Labs: All pertinent labs within the past 24 hours have been reviewed.    Significant Imaging: I have reviewed all pertinent imaging results/findings within the past 24 hours.      Assessment/Plan:      * Acute on chronic combined systolic and diastolic CHF (congestive heart failure)  Presents with SOB and lower extremity edema, BNP 1200, chest x-ray with pulmonary vascular congestion. He reports he began adding salt to his food due to concern that he would lose all of his salt due to diuretic    Patient is identified as having Combined Systolic and Diastolic heart failure that is Acute on chronic. CHF is currently uncontrolled due to Continued edema of extremities, Dyspnea not returned to baseline after 1 doses of IV diuretic and Pulmonary edema/pleural effusion on CXR. Latest ECHO performed and demonstrates- Results for orders placed during the hospital encounter of 08/18/23    Echo    Interpretation Summary    Left Ventricle: The left ventricle is severely dilated. Increased ventricular mass. Normal wall thickness. Global hypokinesis present. There is severely reduced systolic function with a visually estimated ejection fraction of 15 - 20%. There is diastolic dysfunction.    Left Atrium: Left atrium is severely dilated.    Right Ventricle: Severe right ventricular enlargement. Wall thickness is normal. Systolic function is moderately reduced. Pacemaker lead present in the ventricle.    Right Atrium: Right atrium is dilated.    Mitral Valve: Mild mitral annular calcification. There is moderate regurgitation.    Tricuspid Valve: There is mild regurgitation.    Pulmonic Valve: There is mild regurgitation.    Pulmonary Artery: The estimated pulmonary artery systolic pressure is 59 mmHg.    IVC/SVC: Elevated venous pressure at 15 mmHg.  . Continue Beta Blocker and Furosemide and monitor clinical  status closely. Monitor on telemetry. Patient is on CHF pathway.  Monitor strict Is&Os and daily weights.  Place on fluid restriction of 1.5 L. Cardiology has not been any consulted. Continue to stress to patient importance of self efficacy and  on diet for CHF. Last BNP reviewed- and noted below   Recent Labs   Lab 11/10/23  2146   BNP 1,592*     - with RIGOBERTO on CKD3a- suspect cardiorenal  - with elevated TBili- suspect hepatic congestion    Elevated INR  Lab Results   Component Value Date    INR 1.7 (H) 11/11/2023    INR 1.0 04/02/2021    INR 1.0 03/31/2021     - with elevated TBili and low plts, concern for liver disease  - check abdominal US  - no signs of bleeding       Nausea & vomiting  Presents with intermittent nausea with vomiting for 2 weeks. TBili elevated at 4.9 on admit, direct predominance. With elevated INR and low plts.   - active vomiting on 11/11 AM with trying to eat grits-- clear liquids  - PPI IV BID  - PRN antiemetics  - check abdominal US- cirrhosis? Ascites?  - if not improving, will consult GI    Acute renal failure superimposed on stage 3a chronic kidney disease  Per chart review history of CKD. Cr 1.9 on admit from baseline of 1.4 to 1.5.     Creatine stable for now. BMP reviewed- noted Estimated Creatinine Clearance: 57.5 mL/min (A) (based on SCr of 1.8 mg/dL (H)). according to latest data. Based on current GFR, CKD stage is stage 3 - GFR 30-59.  Monitor UOP and serial BMP and adjust therapy as needed. Renally dose meds. Avoid nephrotoxic medications and procedures.  - this is most likely cardiorenal  - continue IV lasix  - holding ARB and spironolactone  - CMP again in AM    Class 1 obesity due to excess calories with serious comorbidity in adult  Body mass index is 32.68 kg/m². Morbid obesity complicates all aspects of disease management from diagnostic modalities to treatment. Weight loss encouraged and health benefits explained to patient.         AMELIA (obstructive sleep  apnea)  CPAP nightly    Thrombocytopenia, unspecified  Patient was found to have thrombocytopenia  No active bleeding  With elevated TBili and elevated INR, concern for liver disease. Last CT 5/2023 showed hepatomegatly  The patient's platelet results have been reviewed and are listed below.  Recent Labs   Lab 11/10/23  2146   *   - check liver US  - monitor CBC     NICM (nonischemic cardiomyopathy)  As above    Biventricular ICD (implantable cardioverter-defibrillator) in place  Stable no acute issues, he denies discharge. QTc chronically prolonged. Continue home amiodarone for NSVT prevention. Monitor on telemetry    Hyperlipidemia  Lipids are well controlled  Continue home pravastatin    Essential hypertension  Chronic, controlled. Latest blood pressure and vitals reviewed-     Temp:  [97.7 °F (36.5 °C)-98.9 °F (37.2 °C)]   Pulse:  [58-71]   Resp:  [18-30]   BP: (104-140)/(55-79)   SpO2:  [94 %-100 %] .   Home meds for hypertension were reviewed and noted below.   Hypertension Medications             furosemide (LASIX) 80 MG tablet TAKE 1 TABLET EVERY DAY    losartan (COZAAR) 25 MG tablet Take 25 mg by mouth once daily.    metoprolol succinate (TOPROL-XL) 50 MG 24 hr tablet TAKE 1 TABLET EVERY DAY    spironolactone (ALDACTONE) 25 MG tablet TAKE 1/2 TABLET (12.5 MG TOTAL) ONCE DAILY. HOLD IF SYSTOLIC BLOOD PRESSURE IS LESS THAN 110        - continue home metoprolol  - hold losartan and spironolactone in setting of RIGOBERTO on CKD  - changed PO lasix to IV for CHF exacerbation    Will utilize p.r.n. blood pressure medication only if patient's blood pressure greater than 180/110 and he develops symptoms such as worsening chest pain or shortness of breath.    VTE Risk Mitigation (From admission, onward)         Ordered     heparin (porcine) injection 5,000 Units  Every 8 hours         11/11/23 0913     IP VTE HIGH RISK PATIENT  Once         11/10/23 2117     Place sequential compression device  Until discontinued          11/10/23 2117                Discharge Planning   MARIIA:      Code Status: Full Code   Is the patient medically ready for discharge?:     Reason for patient still in hospital (select all that apply): Patient trending condition                     Kelin Kennedy MD  Department of Hospital Medicine   [facility]

## 2023-11-11 NOTE — ASSESSMENT & PLAN NOTE
Presents with intermittent nausea without vomiting for 2 weeks. LFTs within normal limits in ED, though POC bilirubin 4.1  Will check Lipase, formal CMP and US abdomen limited  He denies nausea now and requests diet

## 2023-11-12 PROBLEM — R79.89 ELEVATED LFTS: Status: ACTIVE | Noted: 2023-11-12

## 2023-11-12 LAB
ALBUMIN SERPL BCP-MCNC: 3.2 G/DL (ref 3.5–5.2)
ALP SERPL-CCNC: 91 U/L (ref 55–135)
ALT SERPL W/O P-5'-P-CCNC: 61 U/L (ref 10–44)
ANION GAP SERPL CALC-SCNC: 17 MMOL/L (ref 8–16)
AST SERPL-CCNC: 74 U/L (ref 10–40)
BASOPHILS # BLD AUTO: 0.05 K/UL (ref 0–0.2)
BASOPHILS NFR BLD: 1 % (ref 0–1.9)
BILIRUB SERPL-MCNC: 6 MG/DL (ref 0.1–1)
BUN SERPL-MCNC: 36 MG/DL (ref 8–23)
CALCIUM SERPL-MCNC: 9.7 MG/DL (ref 8.7–10.5)
CHLORIDE SERPL-SCNC: 100 MMOL/L (ref 95–110)
CO2 SERPL-SCNC: 20 MMOL/L (ref 23–29)
CREAT SERPL-MCNC: 2.1 MG/DL (ref 0.5–1.4)
DIFFERENTIAL METHOD: ABNORMAL
EOSINOPHIL # BLD AUTO: 0 K/UL (ref 0–0.5)
EOSINOPHIL NFR BLD: 0.6 % (ref 0–8)
ERYTHROCYTE [DISTWIDTH] IN BLOOD BY AUTOMATED COUNT: 19.4 % (ref 11.5–14.5)
EST. GFR  (NO RACE VARIABLE): 34 ML/MIN/1.73 M^2
GLUCOSE SERPL-MCNC: 77 MG/DL (ref 70–110)
HCT VFR BLD AUTO: 41.5 % (ref 40–54)
HGB BLD-MCNC: 12.3 G/DL (ref 14–18)
IMM GRANULOCYTES # BLD AUTO: 0.02 K/UL (ref 0–0.04)
IMM GRANULOCYTES NFR BLD AUTO: 0.4 % (ref 0–0.5)
LACTATE SERPL-SCNC: 3.5 MMOL/L (ref 0.5–2.2)
LYMPHOCYTES # BLD AUTO: 1.3 K/UL (ref 1–4.8)
LYMPHOCYTES NFR BLD: 24.4 % (ref 18–48)
MCH RBC QN AUTO: 22.7 PG (ref 27–31)
MCHC RBC AUTO-ENTMCNC: 29.6 G/DL (ref 32–36)
MCV RBC AUTO: 76 FL (ref 82–98)
MONOCYTES # BLD AUTO: 0.8 K/UL (ref 0.3–1)
MONOCYTES NFR BLD: 16.2 % (ref 4–15)
NEUTROPHILS # BLD AUTO: 3 K/UL (ref 1.8–7.7)
NEUTROPHILS NFR BLD: 57.4 % (ref 38–73)
NRBC BLD-RTO: 1 /100 WBC
PLATELET # BLD AUTO: 130 K/UL (ref 150–450)
PMV BLD AUTO: ABNORMAL FL (ref 9.2–12.9)
POTASSIUM SERPL-SCNC: 4.5 MMOL/L (ref 3.5–5.1)
PROT SERPL-MCNC: 7.2 G/DL (ref 6–8.4)
RBC # BLD AUTO: 5.43 M/UL (ref 4.6–6.2)
SODIUM SERPL-SCNC: 137 MMOL/L (ref 136–145)
WBC # BLD AUTO: 5.17 K/UL (ref 3.9–12.7)

## 2023-11-12 PROCEDURE — 63600175 PHARM REV CODE 636 W HCPCS: Performed by: INTERNAL MEDICINE

## 2023-11-12 PROCEDURE — 11000001 HC ACUTE MED/SURG PRIVATE ROOM

## 2023-11-12 PROCEDURE — 83605 ASSAY OF LACTIC ACID: CPT | Performed by: HOSPITALIST

## 2023-11-12 PROCEDURE — 80053 COMPREHEN METABOLIC PANEL: CPT | Performed by: HOSPITALIST

## 2023-11-12 PROCEDURE — 96376 TX/PRO/DX INJ SAME DRUG ADON: CPT

## 2023-11-12 PROCEDURE — 99213 PR OFFICE/OUTPT VISIT, EST, LEVL III, 20-29 MIN: ICD-10-PCS | Mod: 25,,, | Performed by: INTERNAL MEDICINE

## 2023-11-12 PROCEDURE — 94760 N-INVAS EAR/PLS OXIMETRY 1: CPT

## 2023-11-12 PROCEDURE — 99213 OFFICE O/P EST LOW 20 MIN: CPT | Mod: 25,,, | Performed by: INTERNAL MEDICINE

## 2023-11-12 PROCEDURE — 36415 COLL VENOUS BLD VENIPUNCTURE: CPT | Performed by: HOSPITALIST

## 2023-11-12 PROCEDURE — 25000003 PHARM REV CODE 250: Performed by: HOSPITALIST

## 2023-11-12 PROCEDURE — 27000221 HC OXYGEN, UP TO 24 HOURS

## 2023-11-12 PROCEDURE — C9113 INJ PANTOPRAZOLE SODIUM, VIA: HCPCS | Performed by: HOSPITALIST

## 2023-11-12 PROCEDURE — 63600175 PHARM REV CODE 636 W HCPCS: Performed by: HOSPITALIST

## 2023-11-12 PROCEDURE — 85025 COMPLETE CBC W/AUTO DIFF WBC: CPT | Performed by: HOSPITALIST

## 2023-11-12 PROCEDURE — 96372 THER/PROPH/DIAG INJ SC/IM: CPT | Performed by: HOSPITALIST

## 2023-11-12 PROCEDURE — 25000003 PHARM REV CODE 250: Performed by: PHYSICIAN ASSISTANT

## 2023-11-12 RX ORDER — FUROSEMIDE 10 MG/ML
80 INJECTION INTRAMUSCULAR; INTRAVENOUS EVERY 8 HOURS
Status: DISCONTINUED | OUTPATIENT
Start: 2023-11-12 | End: 2023-11-14

## 2023-11-12 RX ADMIN — HEPARIN SODIUM 5000 UNITS: 5000 INJECTION INTRAVENOUS; SUBCUTANEOUS at 09:11

## 2023-11-12 RX ADMIN — PANTOPRAZOLE SODIUM 40 MG: 40 INJECTION, POWDER, FOR SOLUTION INTRAVENOUS at 09:11

## 2023-11-12 RX ADMIN — ASPIRIN 81 MG CHEWABLE TABLET 81 MG: 81 TABLET CHEWABLE at 08:11

## 2023-11-12 RX ADMIN — FUROSEMIDE 80 MG: 10 INJECTION, SOLUTION INTRAVENOUS at 08:11

## 2023-11-12 RX ADMIN — HEPARIN SODIUM 5000 UNITS: 5000 INJECTION INTRAVENOUS; SUBCUTANEOUS at 05:11

## 2023-11-12 RX ADMIN — PANTOPRAZOLE SODIUM 40 MG: 40 INJECTION, POWDER, FOR SOLUTION INTRAVENOUS at 08:11

## 2023-11-12 RX ADMIN — AMIODARONE HYDROCHLORIDE 200 MG: 200 TABLET ORAL at 08:11

## 2023-11-12 RX ADMIN — HEPARIN SODIUM 5000 UNITS: 5000 INJECTION INTRAVENOUS; SUBCUTANEOUS at 01:11

## 2023-11-12 RX ADMIN — PRAVASTATIN SODIUM 80 MG: 40 TABLET ORAL at 08:11

## 2023-11-12 RX ADMIN — FUROSEMIDE 80 MG: 10 INJECTION, SOLUTION INTRAVENOUS at 09:11

## 2023-11-12 RX ADMIN — FUROSEMIDE 80 MG: 10 INJECTION, SOLUTION INTRAVENOUS at 01:11

## 2023-11-12 NOTE — HPI
68 y.o. male with HTN, HLD, CHF S/P AICD, on amiodarone for VT prevention since the hospital multiple complaints including shortness of breath lower extremity edema and painless nausea and vomiting.  Regarding his shortness breath lower extremity edema in the symptoms have been progressive for the last week.  He reports compliance with his home Lasix of 80 mg.  He reports he began to add salt to his food as he was concerned that he would lose all of his salt due to diuretic use.  He is also had painless nausea and vomiting that occur as occurred intermittently for the last 2 weeks.  Reports a grandson had similar symptoms.  There has been no pain with vomiting.  There has been no blood in the emesis.  He is not currently nauseated.  In the ED, afebrile without leukocytosis chest x-ray with cardiomegaly mild pulmonary vascular congestion troponin negative BNP 1270 creatinine 1.6 point of care bilirubin 4.1.  Overview/Hospital Course:  Mr Papito Bhakta was placed in observation with acute on chronic systolic/diastolic CHF associated with RIGOBERTO on CKD and elevated Tbili. Cause is dietary salt intake. Started IV lasix. Also with nausea, vomiting, with no abdominal pain/fever/diarrhea/hematemesis. Started PPI IV BID and antiemetics.     Follows with [facility]    Cardiology consulted for CHF.    Patient is a pleasant 68-year-old man with a history of nonischemic cardiomyopathy (negative catheterization 2018) status post biventricular ICD placement.  He presents with heart failure exacerbation, and has been somewhat unresponsive to diuretics with a rising creatinine.  He has a history of intolerance to ACE inhibitor, and was unable to afford Entresto.  The patient follows with the heart failure service at [facility] and consideration had been made to the initiation of hydralazine/isosorbide.  At the present time, the patient denies any chest pain.  He does appear to have some oxygen and has lower extremity edema.

## 2023-11-12 NOTE — ASSESSMENT & PLAN NOTE
Presents with intermittent nausea with vomiting for 2 weeks. TBili elevated at 4.9 on admit, direct predominance. With elevated INR and low plts.   - active vomiting on 11/11 AM with trying to eat grits. Improving on 11/12  - patient request to conintu clear liquids  - PPI IV BID  - PRN antiemetics
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
## 2023-11-12 NOTE — SUBJECTIVE & OBJECTIVE
Interval History: No more nausea or vomiting today. Discussed heart failure at length.     Review of Systems   Constitutional:  Negative for chills and fever.   Respiratory:  Positive for shortness of breath. Negative for cough, chest tightness and wheezing.    Cardiovascular:  Positive for leg swelling. Negative for chest pain and palpitations.   Gastrointestinal:  Negative for abdominal distention, abdominal pain, blood in stool, constipation, diarrhea, nausea and vomiting.   Genitourinary:  Negative for difficulty urinating.   Psychiatric/Behavioral:  Negative for confusion.      Objective:     Vital Signs (Most Recent):  Temp: 97.9 °F (36.6 °C) (11/12/23 1130)  Pulse: 60 (11/12/23 1130)  Resp: 20 (11/12/23 1130)  BP: 117/66 (11/12/23 1130)  SpO2: 96 % (11/12/23 1130) Vital Signs (24h Range):  Temp:  [97 °F (36.1 °C)-97.9 °F (36.6 °C)] 97.9 °F (36.6 °C)  Pulse:  [] 60  Resp:  [18-20] 20  SpO2:  [94 %-100 %] 96 %  BP: ()/(57-84) 117/66     Weight: 125 kg (275 lb 9.2 oz)  Body mass index is 32.68 kg/m².    Intake/Output Summary (Last 24 hours) at 11/12/2023 1250  Last data filed at 11/12/2023 1136  Gross per 24 hour   Intake 200 ml   Output 750 ml   Net -550 ml           Physical Exam  Vitals and nursing note reviewed.   Constitutional:       General: He is not in acute distress.     Appearance: He is obese. He is ill-appearing. He is not toxic-appearing.   HENT:      Head: Normocephalic and atraumatic.      Nose: Nose normal.      Mouth/Throat:      Mouth: Mucous membranes are moist.   Eyes:      General: Scleral icterus present.   Cardiovascular:      Rate and Rhythm: Normal rate and regular rhythm.   Pulmonary:      Effort: Pulmonary effort is normal.      Breath sounds: No wheezing or rhonchi.      Comments: 3L NC  Abdominal:      General: Bowel sounds are normal. There is no distension.      Palpations: Abdomen is soft. There is no mass.      Tenderness: There is no abdominal tenderness. There is  no guarding or rebound.   Musculoskeletal:      Right lower leg: Edema present.      Left lower leg: Edema present.   Skin:     General: Skin is warm and dry.   Neurological:      Mental Status: He is alert. Mental status is at baseline.             Significant Labs: All pertinent labs within the past 24 hours have been reviewed.    Significant Imaging: I have reviewed all pertinent imaging results/findings within the past 24 hours.

## 2023-11-12 NOTE — ASSESSMENT & PLAN NOTE
Per chart review history of CKD. Cr 1.9 on admit from baseline of 1.4 to 1.5.     Creatine worsening. BMP reviewed- noted Estimated Creatinine Clearance: 49.3 mL/min (A) (based on SCr of 2.1 mg/dL (H)). according to latest data. Based on current GFR, CKD stage is stage 3 - GFR 30-59.  Monitor UOP and serial BMP and adjust therapy as needed. Renally dose meds. Avoid nephrotoxic medications and procedures.  - this is cardiorenal syndrome  - increase lasix to 80mg IV TID  - holding ARB and spironolactone  - CMP again in AM

## 2023-11-12 NOTE — ASSESSMENT & PLAN NOTE
Patient's COPD is controlled currently.  Patient is currently off COPD Pathway. Monitor respiratory status closely.   - PFTs reviewed  - prior CT with emphysema

## 2023-11-12 NOTE — PLAN OF CARE
Plan of care is ongoing.    Problem: Adult Inpatient Plan of Care  Goal: Plan of Care Review  Outcome: Ongoing, Progressing  Goal: Patient-Specific Goal (Individualized)  Outcome: Ongoing, Progressing  Goal: Absence of Hospital-Acquired Illness or Injury  Outcome: Ongoing, Progressing  Goal: Optimal Comfort and Wellbeing  Outcome: Ongoing, Progressing  Goal: Readiness for Transition of Care  Outcome: Ongoing, Progressing     Problem: Oral Intake Inadequate (Acute Kidney Injury/Impairment)  Goal: Optimal Nutrition Intake  Outcome: Ongoing, Progressing     Problem: Renal Function Impairment (Acute Kidney Injury/Impairment)  Goal: Effective Renal Function  Outcome: Ongoing, Progressing

## 2023-11-12 NOTE — NURSING
[facility] [place]  Nurses Note -- 4 Eyes      11/11/2023       Skin assessed on: Q Shift      [x] No Pressure Injuries Present    []Prevention Measures Documented    [] Yes LDA  for Pressure Injury Previously documented     [] Yes New Pressure Injury Discovered   [] LDA for New Pressure Injury Added      Attending RN:  Blake Yap RN     Second RN:  calvin Carreon

## 2023-11-12 NOTE — ASSESSMENT & PLAN NOTE
Presents with SOB and lower extremity edema, BNP 1200, chest x-ray with pulmonary vascular congestion. He reports he began adding salt to his food due to concern that he would lose all of his salt due to diuretic    Patient is identified as having Combined Systolic and Diastolic heart failure that is Acute on chronic. CHF is currently uncontrolled due to Continued edema of extremities, Dyspnea not returned to baseline after 1 doses of IV diuretic and Pulmonary edema/pleural effusion on CXR. Latest ECHO performed and demonstrates- Results for orders placed during the hospital encounter of 08/18/23    Echo    Interpretation Summary    Left Ventricle: The left ventricle is severely dilated. Increased ventricular mass. Normal wall thickness. Global hypokinesis present. There is severely reduced systolic function with a visually estimated ejection fraction of 15 - 20%. There is diastolic dysfunction.    Left Atrium: Left atrium is severely dilated.    Right Ventricle: Severe right ventricular enlargement. Wall thickness is normal. Systolic function is moderately reduced. Pacemaker lead present in the ventricle.    Right Atrium: Right atrium is dilated.    Mitral Valve: Mild mitral annular calcification. There is moderate regurgitation.    Tricuspid Valve: There is mild regurgitation.    Pulmonic Valve: There is mild regurgitation.    Pulmonary Artery: The estimated pulmonary artery systolic pressure is 59 mmHg.    IVC/SVC: Elevated venous pressure at 15 mmHg.  Monitor on telemetry. Patient is on CHF pathway.  Monitor strict Is&Os and daily weights.  Place on fluid restriction of 1.5 L. Cardiology has been consulted. Continue to stress to patient importance of self efficacy and  on diet for CHF. Last BNP reviewed- and noted below   Recent Labs   Lab 11/10/23  2146   BNP 1,592*     - with RIGOBERTO on CKD3a- cardiorenal syndrome  - with elevated TBili- due to hepatic congestion  - with lactic 3.5-- developing  cardiogenic shock  - hold BB, spironolactone. entresto was too expensive and did not tolerate ACEi/ARB. Consider bidil when improves  - increase lasix to 80mg IV TID  - Palliative consult

## 2023-11-12 NOTE — ASSESSMENT & PLAN NOTE
Patient was found to have thrombocytopenia  No active bleeding  With elevated TBili and elevated INR, concern for congestive hepatopathy. Liver US shows hepatomegaly, no cirrhosis   The patient's platelet results have been reviewed and are listed below.  Recent Labs   Lab 11/12/23  0408   *   - monitor CBC

## 2023-11-12 NOTE — CONSULTS
[facility] S[facility]  Cardiology  Consult Note    Patient Name: Papito Bhakta  MRN: [id number]  Admission Date: 11/10/2023  Hospital Length of Stay: 0 days  Code Status: Full Code   Attending Provider: Kelin Kennedy MD   Consulting Provider: Sridhar Hathaway MD  Primary Care Physician: Brynn To MD  Principal Problem:Acute on chronic combined systolic and diastolic CHF (congestive heart failure)    Patient information was obtained from patient and ER records.     Inpatient consult to Cardiology  Consult performed by: Sridhar Hathaway MD  Consult ordered by: Kelin Kennedy MD  Reason for consult: ADHF        Subjective:     Chief Complaint:  SOB     HPI:   68 y.o. male with HTN, HLD, CHF S/P AICD, on amiodarone for VT prevention since the hospital multiple complaints including shortness of breath lower extremity edema and painless nausea and vomiting.  Regarding his shortness breath lower extremity edema in the symptoms have been progressive for the last week.  He reports compliance with his home Lasix of 80 mg.  He reports he began to add salt to his food as he was concerned that he would lose all of his salt due to diuretic use.  He is also had painless nausea and vomiting that occur as occurred intermittently for the last 2 weeks.  Reports a grandson had similar symptoms.  There has been no pain with vomiting.  There has been no blood in the emesis.  He is not currently nauseated.  In the ED, afebrile without leukocytosis chest x-ray with cardiomegaly mild pulmonary vascular congestion troponin negative BNP 1270 creatinine 1.6 point of care bilirubin 4.1.  Overview/Hospital Course:  Mr Paipto Bhakta was placed in observation with acute on chronic systolic/diastolic CHF associated with RIGOBERTO on CKD and elevated Tbili. Cause is dietary salt intake. Started IV lasix. Also with nausea, vomiting, with no abdominal pain/fever/diarrhea/hematemesis. Started PPI IV BID and antiemetics.     Follows with  [facility]    Cardiology consulted for CHF.    Patient is a pleasant 68-year-old man with a history of nonischemic cardiomyopathy (negative catheterization 2018) status post biventricular ICD placement.  He presents with heart failure exacerbation, and has been somewhat unresponsive to diuretics with a rising creatinine.  He has a history of intolerance to ACE inhibitor, and was unable to afford Entresto.  The patient follows with the heart failure service at [facility] and consideration had been made to the initiation of hydralazine/isosorbide.  At the present time, the patient denies any chest pain.  He does appear to have some oxygen and has lower extremity edema.      Past Medical History:   Diagnosis Date    RIGOBERTO (acute kidney injury) 10/7/2020    Anticoagulant long-term use     Aspirin    CHF (congestive heart failure)     Hyperlipidemia     Hypertension     NICM (nonischemic cardiomyopathy) 6/16/2020    Obesity     AMELIA (obstructive sleep apnea) 1/7/2022    Peripheral vascular disease, unspecified 2/1/2021       Past Surgical History:   Procedure Laterality Date    INSERTION OF BIVENTRICULAR IMPLANTABLE CARDIOVERTER-DEFIBRILLATOR (ICD) N/A 02/13/2019    Procedure: INSERTION, ICD, BIVENTRICULAR;  Surgeon: Shailesh Eng MD;  Location: [facility] CATH LAB;  Service: Cardiology;  Laterality: N/A;  RN PRE OP 2-6-19  1ST CASE PER  RADHA. NOTIFIED [facility] THAT ANESTHESIA IS NOT PITO FOR 1ST CASE START-LO    INSERTION OF BIVENTRICULAR IMPLANTABLE CARDIOVERTER-DEFIBRILLATOR (ICD) N/A 09/28/2020    Procedure: INSERTION, ICD, BIVENTRICULAR;  Surgeon: Jim Kwong MD;  Location: [facility] EP LAB;  Service: Cardiology;  Laterality: N/A;  NICM, CRT-D, SJM,, MAC, DM, 3 Prep*Wearing LifeVest*    oral extraction  11/2018    TESTICLE SURGERY         Review of patient's allergies indicates:   Allergen Reactions    Ramipril      Leg swelling and gynecomastia     Losartan Rash       No current facility-administered medications on  file prior to encounter.     Current Outpatient Medications on File Prior to Encounter   Medication Sig    acetaminophen (TYLENOL) 500 MG tablet Take 2 tablets (1,000 mg total) by mouth every 8 (eight) hours as needed for Pain or Temperature greater than (100.5).    amiodarone (PACERONE) 200 MG Tab Take 1 tablet (200 mg total) by mouth once daily.    aspirin (ECOTRIN) 325 MG EC tablet Take 1 tablet (325 mg total) by mouth once daily.    empagliflozin (JARDIANCE) 10 mg tablet Take 1 tablet (10 mg total) by mouth once daily.    furosemide (LASIX) 80 MG tablet TAKE 1 TABLET EVERY DAY    gabapentin (NEURONTIN) 100 MG capsule Take 1 capsule (100 mg total) by mouth 3 (three) times daily. Take 100 mg by mouth 3 (three) times daily.    hydrocortisone 1 % cream Apply topically 2 (two) times daily.    losartan (COZAAR) 25 MG tablet Take 25 mg by mouth once daily.    meloxicam (MOBIC) 15 MG tablet Take 1 tablet (15 mg total) by mouth daily as needed for Pain.    methocarbamoL (ROBAXIN) 500 MG Tab Take 2 tablets (1,000 mg total) by mouth 3 (three) times daily as needed (Pain not improved by other medications).    metoprolol succinate (TOPROL-XL) 50 MG 24 hr tablet TAKE 1 TABLET EVERY DAY    potassium chloride (K-TAB) 20 mEq TAKE 1 TABLET EVERY DAY    pravastatin (PRAVACHOL) 80 MG tablet TAKE 1 TABLET EVERY DAY    spironolactone (ALDACTONE) 25 MG tablet TAKE 1/2 TABLET (12.5 MG TOTAL) ONCE DAILY. HOLD IF SYSTOLIC BLOOD PRESSURE IS LESS THAN 110    [DISCONTINUED] ramipril (ALTACE) 10 MG capsule Take 1 capsule (10 mg total) by mouth once daily.     Family History       Problem Relation (Age of Onset)    Epilepsy Mother          Tobacco Use    Smoking status: Former     Current packs/day: 0.00     Average packs/day: 0.5 packs/day for 40.0 years (20.0 ttl pk-yrs)     Types: Cigarettes, Cigars     Start date:      Quit date:      Years since quittin.8     Passive exposure: Current    Smokeless tobacco: Never   Substance  and Sexual Activity    Alcohol use: Yes     Comment: once a month beer or liquor    Drug use: No    Sexual activity: Yes     Birth control/protection: None     Comment: uses protection sometimes     Review of Systems   Constitutional: Negative for chills, diaphoresis, fever and malaise/fatigue.   HENT:  Negative for nosebleeds.    Eyes:  Negative for blurred vision and double vision.   Cardiovascular:  Positive for leg swelling. Negative for chest pain, claudication, cyanosis, dyspnea on exertion, orthopnea, palpitations, paroxysmal nocturnal dyspnea and syncope.   Respiratory:  Positive for shortness of breath. Negative for cough and wheezing.    Skin:  Negative for dry skin and poor wound healing.   Musculoskeletal:  Negative for back pain, joint swelling and myalgias.   Gastrointestinal:  Negative for abdominal pain, nausea and vomiting.   Genitourinary:  Negative for hematuria.   Neurological:  Negative for dizziness, headaches, numbness, seizures and weakness.   Psychiatric/Behavioral:  Negative for altered mental status and depression.      Objective:     Vital Signs (Most Recent):  Temp: 97.5 °F (36.4 °C) (11/12/23 0725)  Pulse: 60 (11/12/23 0725)  Resp: 20 (11/12/23 0725)  BP: 111/72 (11/12/23 0725)  SpO2: 100 % (11/12/23 0725) Vital Signs (24h Range):  Temp:  [97 °F (36.1 °C)-98.3 °F (36.8 °C)] 97.5 °F (36.4 °C)  Pulse:  [] 60  Resp:  [18-20] 20  SpO2:  [94 %-100 %] 100 %  BP: ()/(57-84) 111/72     Weight: 125 kg (275 lb 9.2 oz)  Body mass index is 32.68 kg/m².    SpO2: 100 %         Intake/Output Summary (Last 24 hours) at 11/12/2023 0828  Last data filed at 11/12/2023 0811  Gross per 24 hour   Intake 200 ml   Output 800 ml   Net -600 ml       Lines/Drains/Airways       Peripheral Intravenous Line  Duration                  Peripheral IV - Single Lumen 11/10/23 1659 20 G Left Antecubital 1 day                     Physical Exam  Constitutional:       General: He is not in acute distress.      Appearance: He is well-developed. He is obese. He is not ill-appearing, toxic-appearing or diaphoretic.   HENT:      Head: Normocephalic and atraumatic.   Eyes:      General: No scleral icterus.     Extraocular Movements: Extraocular movements intact.      Conjunctiva/sclera: Conjunctivae normal.      Pupils: Pupils are equal, round, and reactive to light.   Neck:      Thyroid: No thyromegaly.      Vascular: JVD present.      Trachea: No tracheal deviation.   Cardiovascular:      Rate and Rhythm: Normal rate and regular rhythm.      Heart sounds: S1 normal and S2 normal. Heart sounds are distant. No murmur heard.     No friction rub. No gallop.   Pulmonary:      Effort: Pulmonary effort is normal. No respiratory distress.      Breath sounds: Normal breath sounds. No stridor. No wheezing, rhonchi or rales.   Chest:      Chest wall: No tenderness.   Abdominal:      General: There is no distension.      Palpations: Abdomen is soft.   Musculoskeletal:         General: No tenderness. Normal range of motion.      Cervical back: Normal range of motion and neck supple. No rigidity.      Right lower leg: Edema present.      Left lower leg: Edema present.   Skin:     General: Skin is warm and dry.      Coloration: Skin is not jaundiced.   Neurological:      General: No focal deficit present.      Mental Status: He is alert and oriented to person, place, and time.      Cranial Nerves: No cranial nerve deficit.   Psychiatric:         Mood and Affect: Mood normal.         Behavior: Behavior normal.          Current Medications:   amiodarone  200 mg Oral Daily    aspirin  81 mg Oral Daily    furosemide (LASIX) injection  80 mg Intravenous Q12H    heparin (porcine)  5,000 Units Subcutaneous Q8H    metoprolol succinate  50 mg Oral Daily    pantoprazole  40 mg Intravenous BID    pravastatin  80 mg Oral Daily       acetaminophen, melatonin, ondansetron, prochlorperazine, senna-docusate 8.6-50 mg, sodium chloride 0.9%    Laboratory  (all labs reviewed):  CBC:  Recent Labs   Lab 03/31/21  1808 07/18/22  1251 05/12/23  1622 11/10/23  2146 11/12/23  0408   WBC 5.01 5.01 4.68 5.15 5.17   Hemoglobin 12.6 L 13.7 L 12.8 L 12.9 L 12.3 L   Hematocrit 41.3 46.4 39.8 L 41.6 41.5   Platelets 150 157 145 L 116 L 130 L       CHEMISTRIES:  Recent Labs   Lab 11/19/20  0300 11/20/20  0243 11/21/20  0622 03/11/21  1009 03/31/21  1808 09/15/21  0940 07/18/22  1251 08/05/22  1119 05/12/23  1622 08/25/23  0940 11/10/23  2146 11/11/23  0344 11/12/23  0408   Glucose 89 84 90 88 90 90 94   < > 101 79 90 82 77   Sodium 142 142 142 140 141 137 138   < > 139 138 139 138 137   Potassium 3.1 L 3.2 L 3.5 4.7 4.3 4.5 4.6   < > 3.9 4.0 4.3 4.1 4.5   BUN 16 14 13 27 H 16 15 21   < > 21 21 33 H 33 H 36 H   Creatinine 1.1 0.9 0.8 1.5 H 1.3 1.2 1.5 H   < > 1.4 1.4 1.9 H 1.8 H 2.1 H   eGFR if non African American >60.0 >60.0 >60.0 48 A 57.3 A >60.0 47.5 A  --   --   --   --   --   --    eGFR  --   --   --   --   --   --   --    < > 55 A 54.7 A 38 A 40 A 34 A   Calcium 8.7 8.8 9.0 9.1 9.0 9.4 9.5   < > 9.0 9.2 9.8 9.6 9.7   Magnesium 1.8 1.7 1.7  --   --   --   --   --   --   --  2.0  --   --     < > = values in this interval not displayed.       CARDIAC BIOMARKERS:  Recent Labs   Lab 11/10/23  2146 11/11/23  0344   Troponin I 0.014 0.028 H       COAGS:  Recent Labs   Lab 03/31/21  1808 04/02/21  1157 11/11/23  0344   INR 1.0 1.0 1.7 H       LIPIDS/LFTS:  Recent Labs   Lab 11/18/20  1058 03/31/21  1808 07/18/22  1251 10/14/22  1020 05/12/23  1622 08/25/23  0940 09/14/23  1625 11/10/23  2146 11/11/23  0344 11/12/23  0408   Cholesterol 143  --  148  --   --   --  103 L  --   --   --    Triglycerides 82  --  90  --   --   --  51  --   --   --    HDL 49  --  51  --   --   --  35 L  --   --   --    LDL Cholesterol 77.6  --  79.0  --   --   --  57.8 L  --   --   --    Non-HDL Cholesterol 94  --  97  --   --   --  68  --   --   --    AST 15   < > 15   < > 18 19  --  46 H 53 H 74 H   ALT 9  L   < > 9 L   < > 9 L 16  --  46 H 47 H 61 H    < > = values in this interval not displayed.       BNP:  Recent Labs   Lab 11/18/20  1058 08/25/23  0940 11/10/23  2146    H 525 H 1,592 H       TSH:  Recent Labs   Lab 05/12/21  0929 07/18/22  1251 09/14/23  1625   TSH 3.421 1.729 2.764       Free T4:        Diagnostic Results:  ECG (personally reviewed and interpreted tracing(s)):  11/10/23 1746 AS-BiV paced 66    Chest X-Ray (personally reviewed and interpreted image(s)): 11/10/23 CHF, CMeg, R PPM 3 leads    Echo: 8/18/23 (repeat ordered)    Left Ventricle: The left ventricle is severely dilated. Increased ventricular mass. Normal wall thickness. Global hypokinesis present. There is severely reduced systolic function with a visually estimated ejection fraction of 15 - 20%. There is diastolic dysfunction.    Left Atrium: Left atrium is severely dilated.    Right Ventricle: Severe right ventricular enlargement. Wall thickness is normal. Systolic function is moderately reduced. Pacemaker lead present in the ventricle.    Right Atrium: Right atrium is dilated.    Mitral Valve: Mild mitral annular calcification. There is moderate regurgitation.    Tricuspid Valve: There is mild regurgitation.    Pulmonic Valve: There is mild regurgitation.    Pulmonary Artery: The estimated pulmonary artery systolic pressure is 59 mmHg.    IVC/SVC: Elevated venous pressure at 15 mmHg.    Cath: 2/1/18    Normal coronary arteries.     Systolic dysfunction.     Low right and left Filling Pressures.     Mild Pulmonary Hypertension.       Assessment and Plan:     * Acute on chronic combined systolic and diastolic CHF (congestive heart failure)  Known severe NICM (neg cath 2018) s/p BiV ICD  Prev intol of ACE/ARB, entresto was too pricey  Now with progressive CHF and elev creat c/w cardiorenal syndrome  Inc lasix 80mg iv tid  Consider lasix gtt +/-  as contingency  Eventual hydrala/Imdur if BP will allow  Hold BBL/aldactone  Check  echo  Consider as a candidate for CardioMEMS implant  Palliative care consult    NICM (nonischemic cardiomyopathy)  As above    Acute renal failure superimposed on stage 3a chronic kidney disease  As above    Essential hypertension  As above    Hyperlipidemia  Cont statin    Biventricular ICD (implantable cardioverter-defibrillator) in place  As above, appears to be functioning normally        VTE Risk Mitigation (From admission, onward)           Ordered     heparin (porcine) injection 5,000 Units  Every 8 hours         11/11/23 0913     IP VTE HIGH RISK PATIENT  Once         11/10/23 2117     Place sequential compression device  Until discontinued         11/10/23 2117                    Thank you for your consult. I will follow-up with patient. Please contact us if you have any additional questions.    Sridhar Hathaway MD  Cardiology   [facility]

## 2023-11-12 NOTE — PLAN OF CARE
Problem: Adult Inpatient Plan of Care  Goal: Plan of Care Review  Outcome: Ongoing, Progressing  Flowsheets (Taken 11/12/2023 0815)  Plan of Care Reviewed With:   patient   daughter  Goal: Patient-Specific Goal (Individualized)  Outcome: Ongoing, Progressing     Problem: Fluid and Electrolyte Imbalance (Acute Kidney Injury/Impairment)  Goal: Fluid and Electrolyte Balance  Outcome: Ongoing, Progressing  Intervention: Monitor and Manage Fluid and Electrolyte Balance  Flowsheets (Taken 11/12/2023 0815)  Fluid/Electrolyte Management: fluids restricted     Problem: Oral Intake Inadequate (Acute Kidney Injury/Impairment)  Goal: Optimal Nutrition Intake  Outcome: Ongoing, Progressing     Problem: Adult Inpatient Plan of Care  Goal: Plan of Care Review  Outcome: Ongoing, Progressing  Flowsheets (Taken 11/12/2023 0815)  Plan of Care Reviewed With:   patient   daughter     Problem: Adult Inpatient Plan of Care  Goal: Plan of Care Review  Outcome: Ongoing, Progressing  Flowsheets (Taken 11/12/2023 0815)  Plan of Care Reviewed With:   patient   daughter     Problem: Adult Inpatient Plan of Care  Goal: Patient-Specific Goal (Individualized)  Outcome: Ongoing, Progressing     Problem: Adult Inpatient Plan of Care  Goal: Patient-Specific Goal (Individualized)  Outcome: Ongoing, Progressing     Problem: Fluid and Electrolyte Imbalance (Acute Kidney Injury/Impairment)  Goal: Fluid and Electrolyte Balance  Outcome: Ongoing, Progressing  Intervention: Monitor and Manage Fluid and Electrolyte Balance  Flowsheets (Taken 11/12/2023 0815)  Fluid/Electrolyte Management: fluids restricted     Problem: Fluid and Electrolyte Imbalance (Acute Kidney Injury/Impairment)  Goal: Fluid and Electrolyte Balance  Outcome: Ongoing, Progressing     Problem: Fluid and Electrolyte Imbalance (Acute Kidney Injury/Impairment)  Goal: Fluid and Electrolyte Balance  Intervention: Monitor and Manage Fluid and Electrolyte Balance  Flowsheets (Taken 11/12/2023  0815)  Fluid/Electrolyte Management: fluids restricted     Problem: Fluid and Electrolyte Imbalance (Acute Kidney Injury/Impairment)  Goal: Fluid and Electrolyte Balance  Intervention: Monitor and Manage Fluid and Electrolyte Balance  Flowsheets (Taken 11/12/2023 0815)  Fluid/Electrolyte Management: fluids restricted     Problem: Oral Intake Inadequate (Acute Kidney Injury/Impairment)  Goal: Optimal Nutrition Intake  Outcome: Ongoing, Progressing     Problem: Oral Intake Inadequate (Acute Kidney Injury/Impairment)  Goal: Optimal Nutrition Intake  Outcome: Ongoing, Progressing

## 2023-11-12 NOTE — SUBJECTIVE & OBJECTIVE
Past Medical History:   Diagnosis Date    RIGOBERTO (acute kidney injury) 10/7/2020    Anticoagulant long-term use     Aspirin    CHF (congestive heart failure)     Hyperlipidemia     Hypertension     NICM (nonischemic cardiomyopathy) 6/16/2020    Obesity     AMELIA (obstructive sleep apnea) 1/7/2022    Peripheral vascular disease, unspecified 2/1/2021       Past Surgical History:   Procedure Laterality Date    INSERTION OF BIVENTRICULAR IMPLANTABLE CARDIOVERTER-DEFIBRILLATOR (ICD) N/A 02/13/2019    Procedure: INSERTION, ICD, BIVENTRICULAR;  Surgeon: Shailesh Eng MD;  Location: [facility] CATH LAB;  Service: Cardiology;  Laterality: N/A;  RN PRE OP 2-6-19  1ST CASE PER  RADHA. NOTIFIED RADHA THAT ANESTHESIA IS NOT PITO FOR 1ST CASE START-LO    INSERTION OF BIVENTRICULAR IMPLANTABLE CARDIOVERTER-DEFIBRILLATOR (ICD) N/A 09/28/2020    Procedure: INSERTION, ICD, BIVENTRICULAR;  Surgeon: Jim Kwong MD;  Location: [facility] EP LAB;  Service: Cardiology;  Laterality: N/A;  NICM, CRT-D, SJM,, MAC, DM, 3 Prep*Wearing LifeVest*    oral extraction  11/2018    TESTICLE SURGERY         Review of patient's allergies indicates:   Allergen Reactions    Ramipril      Leg swelling and gynecomastia     Losartan Rash       No current facility-administered medications on file prior to encounter.     Current Outpatient Medications on File Prior to Encounter   Medication Sig    acetaminophen (TYLENOL) 500 MG tablet Take 2 tablets (1,000 mg total) by mouth every 8 (eight) hours as needed for Pain or Temperature greater than (100.5).    amiodarone (PACERONE) 200 MG Tab Take 1 tablet (200 mg total) by mouth once daily.    aspirin (ECOTRIN) 325 MG EC tablet Take 1 tablet (325 mg total) by mouth once daily.    empagliflozin (JARDIANCE) 10 mg tablet Take 1 tablet (10 mg total) by mouth once daily.    furosemide (LASIX) 80 MG tablet TAKE 1 TABLET EVERY DAY    gabapentin (NEURONTIN) 100 MG capsule Take 1 capsule (100 mg total) by mouth 3 (three) times  daily. Take 100 mg by mouth 3 (three) times daily.    hydrocortisone 1 % cream Apply topically 2 (two) times daily.    losartan (COZAAR) 25 MG tablet Take 25 mg by mouth once daily.    meloxicam (MOBIC) 15 MG tablet Take 1 tablet (15 mg total) by mouth daily as needed for Pain.    methocarbamoL (ROBAXIN) 500 MG Tab Take 2 tablets (1,000 mg total) by mouth 3 (three) times daily as needed (Pain not improved by other medications).    metoprolol succinate (TOPROL-XL) 50 MG 24 hr tablet TAKE 1 TABLET EVERY DAY    potassium chloride (K-TAB) 20 mEq TAKE 1 TABLET EVERY DAY    pravastatin (PRAVACHOL) 80 MG tablet TAKE 1 TABLET EVERY DAY    spironolactone (ALDACTONE) 25 MG tablet TAKE 1/2 TABLET (12.5 MG TOTAL) ONCE DAILY. HOLD IF SYSTOLIC BLOOD PRESSURE IS LESS THAN 110    [DISCONTINUED] ramipril (ALTACE) 10 MG capsule Take 1 capsule (10 mg total) by mouth once daily.     Family History       Problem Relation (Age of Onset)    Epilepsy Mother          Tobacco Use    Smoking status: Former     Current packs/day: 0.00     Average packs/day: 0.5 packs/day for 40.0 years (20.0 ttl pk-yrs)     Types: Cigarettes, Cigars     Start date:      Quit date:      Years since quittin.8     Passive exposure: Current    Smokeless tobacco: Never   Substance and Sexual Activity    Alcohol use: Yes     Comment: once a month beer or liquor    Drug use: No    Sexual activity: Yes     Birth control/protection: None     Comment: uses protection sometimes     Review of Systems   Constitutional: Negative for chills, diaphoresis, fever and malaise/fatigue.   HENT:  Negative for nosebleeds.    Eyes:  Negative for blurred vision and double vision.   Cardiovascular:  Positive for leg swelling. Negative for chest pain, claudication, cyanosis, dyspnea on exertion, orthopnea, palpitations, paroxysmal nocturnal dyspnea and syncope.   Respiratory:  Positive for shortness of breath. Negative for cough and wheezing.    Skin:  Negative for dry skin  and poor wound healing.   Musculoskeletal:  Negative for back pain, joint swelling and myalgias.   Gastrointestinal:  Negative for abdominal pain, nausea and vomiting.   Genitourinary:  Negative for hematuria.   Neurological:  Negative for dizziness, headaches, numbness, seizures and weakness.   Psychiatric/Behavioral:  Negative for altered mental status and depression.      Objective:     Vital Signs (Most Recent):  Temp: 97.5 °F (36.4 °C) (11/12/23 0725)  Pulse: 60 (11/12/23 0725)  Resp: 20 (11/12/23 0725)  BP: 111/72 (11/12/23 0725)  SpO2: 100 % (11/12/23 0725) Vital Signs (24h Range):  Temp:  [97 °F (36.1 °C)-98.3 °F (36.8 °C)] 97.5 °F (36.4 °C)  Pulse:  [] 60  Resp:  [18-20] 20  SpO2:  [94 %-100 %] 100 %  BP: ()/(57-84) 111/72     Weight: 125 kg (275 lb 9.2 oz)  Body mass index is 32.68 kg/m².    SpO2: 100 %         Intake/Output Summary (Last 24 hours) at 11/12/2023 0828  Last data filed at 11/12/2023 0811  Gross per 24 hour   Intake 200 ml   Output 800 ml   Net -600 ml       Lines/Drains/Airways       Peripheral Intravenous Line  Duration                  Peripheral IV - Single Lumen 11/10/23 1659 20 G Left Antecubital 1 day                     Physical Exam  Constitutional:       General: He is not in acute distress.     Appearance: He is well-developed. He is obese. He is not ill-appearing, toxic-appearing or diaphoretic.   HENT:      Head: Normocephalic and atraumatic.   Eyes:      General: No scleral icterus.     Extraocular Movements: Extraocular movements intact.      Conjunctiva/sclera: Conjunctivae normal.      Pupils: Pupils are equal, round, and reactive to light.   Neck:      Thyroid: No thyromegaly.      Vascular: JVD present.      Trachea: No tracheal deviation.   Cardiovascular:      Rate and Rhythm: Normal rate and regular rhythm.      Heart sounds: S1 normal and S2 normal. Heart sounds are distant. No murmur heard.     No friction rub. No gallop.   Pulmonary:      Effort: Pulmonary  effort is normal. No respiratory distress.      Breath sounds: Normal breath sounds. No stridor. No wheezing, rhonchi or rales.   Chest:      Chest wall: No tenderness.   Abdominal:      General: There is no distension.      Palpations: Abdomen is soft.   Musculoskeletal:         General: No tenderness. Normal range of motion.      Cervical back: Normal range of motion and neck supple. No rigidity.      Right lower leg: Edema present.      Left lower leg: Edema present.   Skin:     General: Skin is warm and dry.      Coloration: Skin is not jaundiced.   Neurological:      General: No focal deficit present.      Mental Status: He is alert and oriented to person, place, and time.      Cranial Nerves: No cranial nerve deficit.   Psychiatric:         Mood and Affect: Mood normal.         Behavior: Behavior normal.          Current Medications:   amiodarone  200 mg Oral Daily    aspirin  81 mg Oral Daily    furosemide (LASIX) injection  80 mg Intravenous Q12H    heparin (porcine)  5,000 Units Subcutaneous Q8H    metoprolol succinate  50 mg Oral Daily    pantoprazole  40 mg Intravenous BID    pravastatin  80 mg Oral Daily       acetaminophen, melatonin, ondansetron, prochlorperazine, senna-docusate 8.6-50 mg, sodium chloride 0.9%    Laboratory (all labs reviewed):  CBC:  Recent Labs   Lab 03/31/21  1808 07/18/22  1251 05/12/23  1622 11/10/23  2146 11/12/23  0408   WBC 5.01 5.01 4.68 5.15 5.17   Hemoglobin 12.6 L 13.7 L 12.8 L 12.9 L 12.3 L   Hematocrit 41.3 46.4 39.8 L 41.6 41.5   Platelets 150 157 145 L 116 L 130 L       CHEMISTRIES:  Recent Labs   Lab 11/19/20  0300 11/20/20  0243 11/21/20  0622 03/11/21  1009 03/31/21  1808 09/15/21  0940 07/18/22  1251 08/05/22  1119 05/12/23  1622 08/25/23  0940 11/10/23  2146 11/11/23  0344 11/12/23  0408   Glucose 89 84 90 88 90 90 94   < > 101 79 90 82 77   Sodium 142 142 142 140 141 137 138   < > 139 138 139 138 137   Potassium 3.1 L 3.2 L 3.5 4.7 4.3 4.5 4.6   < > 3.9 4.0 4.3  4.1 4.5   BUN 16 14 13 27 H 16 15 21   < > 21 21 33 H 33 H 36 H   Creatinine 1.1 0.9 0.8 1.5 H 1.3 1.2 1.5 H   < > 1.4 1.4 1.9 H 1.8 H 2.1 H   eGFR if non African American >60.0 >60.0 >60.0 48 A 57.3 A >60.0 47.5 A  --   --   --   --   --   --    eGFR  --   --   --   --   --   --   --    < > 55 A 54.7 A 38 A 40 A 34 A   Calcium 8.7 8.8 9.0 9.1 9.0 9.4 9.5   < > 9.0 9.2 9.8 9.6 9.7   Magnesium 1.8 1.7 1.7  --   --   --   --   --   --   --  2.0  --   --     < > = values in this interval not displayed.       CARDIAC BIOMARKERS:  Recent Labs   Lab 11/10/23  2146 11/11/23  0344   Troponin I 0.014 0.028 H       COAGS:  Recent Labs   Lab 03/31/21 1808 04/02/21  1157 11/11/23  0344   INR 1.0 1.0 1.7 H       LIPIDS/LFTS:  Recent Labs   Lab 11/18/20  1058 03/31/21  1808 07/18/22  1251 10/14/22  1020 05/12/23  1622 08/25/23  0940 09/14/23  1625 11/10/23  2146 11/11/23  0344 11/12/23  0408   Cholesterol 143  --  148  --   --   --  103 L  --   --   --    Triglycerides 82  --  90  --   --   --  51  --   --   --    HDL 49  --  51  --   --   --  35 L  --   --   --    LDL Cholesterol 77.6  --  79.0  --   --   --  57.8 L  --   --   --    Non-HDL Cholesterol 94  --  97  --   --   --  68  --   --   --    AST 15   < > 15   < > 18 19  --  46 H 53 H 74 H   ALT 9 L   < > 9 L   < > 9 L 16  --  46 H 47 H 61 H    < > = values in this interval not displayed.       BNP:  Recent Labs   Lab 11/18/20  1058 08/25/23  0940 11/10/23  2146    H 525 H 1,592 H       TSH:  Recent Labs   Lab 05/12/21  0929 07/18/22  1251 09/14/23  1625   TSH 3.421 1.729 2.764       Free T4:        Diagnostic Results:  ECG (personally reviewed and interpreted tracing(s)):  11/10/23 1746 AS-BiV paced 66    Chest X-Ray (personally reviewed and interpreted image(s)): 11/10/23 CHF, CMeg, R PPM 3 leads    Echo: 8/18/23    Left Ventricle: The left ventricle is severely dilated. Increased ventricular mass. Normal wall thickness. Global hypokinesis present. There is  severely reduced systolic function with a visually estimated ejection fraction of 15 - 20%. There is diastolic dysfunction.    Left Atrium: Left atrium is severely dilated.    Right Ventricle: Severe right ventricular enlargement. Wall thickness is normal. Systolic function is moderately reduced. Pacemaker lead present in the ventricle.    Right Atrium: Right atrium is dilated.    Mitral Valve: Mild mitral annular calcification. There is moderate regurgitation.    Tricuspid Valve: There is mild regurgitation.    Pulmonic Valve: There is mild regurgitation.    Pulmonary Artery: The estimated pulmonary artery systolic pressure is 59 mmHg.    IVC/SVC: Elevated venous pressure at 15 mmHg.    Cath: 2/1/18    Normal coronary arteries.     Systolic dysfunction.     Low right and left Filling Pressures.     Mild Pulmonary Hypertension.
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
## 2023-11-12 NOTE — ASSESSMENT & PLAN NOTE
Known severe NICM (neg cath 2018) s/p BiV ICD  Prev intol of ACE/ARB, entresto was too pricey  Now with progressive CHF and elev creat c/w cardiorenal syndrome  Cont lasix 80mg iv tid  Add metolazone  Consider lasix gtt +/-  as contingency  Eventual hydrala/Imdur if BP will allow  Hold BBL/aldactone  Check echo  Consider as a candidate for CardioMEMS implant  Palliative care consult

## 2023-11-12 NOTE — PLAN OF CARE
Problem: Adult Inpatient Plan of Care  Goal: Patient-Specific Goal (Individualized)  Outcome: Ongoing, Progressing  Goal: Absence of Hospital-Acquired Illness or Injury  Outcome: Ongoing, Progressing  Goal: Optimal Comfort and Wellbeing  Outcome: Ongoing, Progressing     Problem: Oral Intake Inadequate (Acute Kidney Injury/Impairment)  Goal: Optimal Nutrition Intake  Outcome: Ongoing, Progressing

## 2023-11-12 NOTE — NURSING
Patient remained free of fall/injury during shift.Plan oF care reviewed with patient. Patient verbalizes understanding. Patient in bed, eyes closed RR even and unlabored. Fall/safety precautions in place. No acute distress noted. Will report to night nurse.

## 2023-11-12 NOTE — ASSESSMENT & PLAN NOTE
Lab Results   Component Value Date    INR 1.7 (H) 11/11/2023    INR 1.0 04/02/2021    INR 1.0 03/31/2021     - with elevated TBili and low plts, concern for liver disease  - abdominal US no cirrhosis  - suspect congestive hepatopathy  - check INR in AM  - no signs of bleeding

## 2023-11-12 NOTE — ASSESSMENT & PLAN NOTE
Known severe NICM (neg cath 2018) s/p BiV ICD  Prev intol of ACE/ARB, entresto was too pricey  Now with progressive CHF and elev creat c/w cardiorenal syndrome  Inc lasix 80mg iv tid  Consider lasix gtt +/-  as contingency  Eventual hydrala/Imdur if BP will allow  Hold BBL/aldactone  Consider as a candidate for CardioMEMS implant  Palliative care consult

## 2023-11-12 NOTE — NURSING
[facility]  Nurses Note -- 4 Eyes      11/12/2023       Skin assessed on: Q Shift      [x] No Pressure Injuries Present    []Prevention Measures Documented    [] Yes LDA  for Pressure Injury Previously documented     [] Yes New Pressure Injury Discovered   [] LDA for New Pressure Injury Added      Attending RN:  Kim Alcantara RN     Second RN:  Blake

## 2023-11-12 NOTE — ASSESSMENT & PLAN NOTE
Chronic, controlled. Latest blood pressure and vitals reviewed-     Temp:  [97 °F (36.1 °C)-97.9 °F (36.6 °C)]   Pulse:  []   Resp:  [18-20]   BP: ()/(57-84)   SpO2:  [94 %-100 %] .   Home meds for hypertension were reviewed and noted below.   Hypertension Medications             furosemide (LASIX) 80 MG tablet TAKE 1 TABLET EVERY DAY    losartan (COZAAR) 25 MG tablet Take 25 mg by mouth once daily.    metoprolol succinate (TOPROL-XL) 50 MG 24 hr tablet TAKE 1 TABLET EVERY DAY    spironolactone (ALDACTONE) 25 MG tablet TAKE 1/2 TABLET (12.5 MG TOTAL) ONCE DAILY. HOLD IF SYSTOLIC BLOOD PRESSURE IS LESS THAN 110        - holding metoprolol with concerns for developing cardiogenic shock  - hold losartan and spironolactone in setting of RIGOBERTO on CKD  - changed PO lasix to IV for CHF exacerbation  - BP is trending lower-- monitoring     Will utilize p.r.n. blood pressure medication only if patient's blood pressure greater than 180/110 and he develops symptoms such as worsening chest pain or shortness of breath.

## 2023-11-13 ENCOUNTER — PATIENT OUTREACH (OUTPATIENT)
Dept: ADMINISTRATIVE | Facility: HOSPITAL | Age: 68
End: 2023-11-13
Payer: MEDICARE

## 2023-11-13 LAB
ALBUMIN SERPL BCP-MCNC: 3 G/DL (ref 3.5–5.2)
ALP SERPL-CCNC: 85 U/L (ref 55–135)
ALT SERPL W/O P-5'-P-CCNC: 84 U/L (ref 10–44)
ANION GAP SERPL CALC-SCNC: 14 MMOL/L (ref 8–16)
AORTIC ROOT ANNULUS: 3.69 CM
AORTIC VALVE CUSP SEPERATION: 2.22 CM
ASCENDING AORTA: 3.04 CM
AST SERPL-CCNC: 104 U/L (ref 10–40)
AV INDEX (PROSTH): 0.45
AV MEAN GRADIENT: 4 MMHG
AV PEAK GRADIENT: 7 MMHG
AV VALVE AREA BY VELOCITY RATIO: 2.22 CM²
AV VALVE AREA: 1.78 CM²
AV VELOCITY RATIO: 0.56
BASOPHILS # BLD AUTO: 0.06 K/UL (ref 0–0.2)
BASOPHILS NFR BLD: 1 % (ref 0–1.9)
BILIRUB SERPL-MCNC: 5.5 MG/DL (ref 0.1–1)
BSA FOR ECHO PROCEDURE: 2.61 M2
BUN SERPL-MCNC: 39 MG/DL (ref 8–23)
CALCIUM SERPL-MCNC: 9.4 MG/DL (ref 8.7–10.5)
CHLORIDE SERPL-SCNC: 101 MMOL/L (ref 95–110)
CO2 SERPL-SCNC: 21 MMOL/L (ref 23–29)
CREAT SERPL-MCNC: 2.1 MG/DL (ref 0.5–1.4)
CV ECHO LV RWT: 0.35 CM
DIFFERENTIAL METHOD: ABNORMAL
DOP CALC AO PEAK VEL: 1.35 M/S
DOP CALC AO VTI: 27 CM
DOP CALC LVOT AREA: 3.9 CM2
DOP CALC LVOT DIAMETER: 2.24 CM
DOP CALC LVOT PEAK VEL: 0.76 M/S
DOP CALC LVOT STROKE VOLUME: 48.05 CM3
DOP CALCLVOT PEAK VEL VTI: 12.2 CM
E WAVE DECELERATION TIME: 155.66 MSEC
E/A RATIO: 3.04
E/E' RATIO: 31 M/S
ECHO LV POSTERIOR WALL: 1.39 CM (ref 0.6–1.1)
EOSINOPHIL # BLD AUTO: 0 K/UL (ref 0–0.5)
EOSINOPHIL NFR BLD: 0.7 % (ref 0–8)
ERYTHROCYTE [DISTWIDTH] IN BLOOD BY AUTOMATED COUNT: 18.7 % (ref 11.5–14.5)
EST. GFR  (NO RACE VARIABLE): 34 ML/MIN/1.73 M^2
FRACTIONAL SHORTENING: 6 % (ref 28–44)
GLUCOSE SERPL-MCNC: 97 MG/DL (ref 70–110)
HCT VFR BLD AUTO: 37.5 % (ref 40–54)
HGB BLD-MCNC: 11.8 G/DL (ref 14–18)
IMM GRANULOCYTES # BLD AUTO: 0.02 K/UL (ref 0–0.04)
IMM GRANULOCYTES NFR BLD AUTO: 0.3 % (ref 0–0.5)
INTERVENTRICULAR SEPTUM: 1.38 CM (ref 0.6–1.1)
IVC DIAMETER: 3.1 CM
LA MAJOR: 9.42 CM
LA MINOR: 8.97 CM
LA WIDTH: 7 CM
LACTATE SERPL-SCNC: 2.5 MMOL/L (ref 0.5–2.2)
LEFT ATRIUM SIZE: 5.6 CM
LEFT ATRIUM VOLUME INDEX: 119.6 ML/M2
LEFT ATRIUM VOLUME: 306.19 CM3
LEFT INTERNAL DIMENSION IN SYSTOLE: 7.36 CM (ref 2.1–4)
LEFT VENTRICLE DIASTOLIC VOLUME INDEX: 128.66 ML/M2
LEFT VENTRICLE DIASTOLIC VOLUME: 329.37 ML
LEFT VENTRICLE MASS INDEX: 232 G/M2
LEFT VENTRICLE SYSTOLIC VOLUME INDEX: 111.6 ML/M2
LEFT VENTRICLE SYSTOLIC VOLUME: 285.73 ML
LEFT VENTRICULAR INTERNAL DIMENSION IN DIASTOLE: 7.84 CM (ref 3.5–6)
LEFT VENTRICULAR MASS: 593.4 G
LV LATERAL E/E' RATIO: 31 M/S
LV SEPTAL E/E' RATIO: 31 M/S
LVOT MG: 1.25 MMHG
LVOT MV: 0.52 CM/S
LYMPHOCYTES # BLD AUTO: 1.2 K/UL (ref 1–4.8)
LYMPHOCYTES NFR BLD: 19.6 % (ref 18–48)
MCH RBC QN AUTO: 22.7 PG (ref 27–31)
MCHC RBC AUTO-ENTMCNC: 31.5 G/DL (ref 32–36)
MCV RBC AUTO: 72 FL (ref 82–98)
MONOCYTES # BLD AUTO: 1.1 K/UL (ref 0.3–1)
MONOCYTES NFR BLD: 18 % (ref 4–15)
MV PEAK A VEL: 0.51 M/S
MV PEAK E VEL: 1.55 M/S
MV STENOSIS PRESSURE HALF TIME: 45.14 MS
MV VALVE AREA P 1/2 METHOD: 4.87 CM2
NEUTROPHILS # BLD AUTO: 3.6 K/UL (ref 1.8–7.7)
NEUTROPHILS NFR BLD: 60.4 % (ref 38–73)
NRBC BLD-RTO: 0 /100 WBC
PISA TR MAX VEL: 3.6 M/S
PLATELET # BLD AUTO: 128 K/UL (ref 150–450)
PMV BLD AUTO: ABNORMAL FL (ref 9.2–12.9)
POTASSIUM SERPL-SCNC: 4.2 MMOL/L (ref 3.5–5.1)
PROT SERPL-MCNC: 6.8 G/DL (ref 6–8.4)
PV PEAK GRADIENT: 5 MMHG
PV PEAK VELOCITY: 1.13 M/S
RA MAJOR: 7.17 CM
RA PRESSURE ESTIMATED: 15 MMHG
RA WIDTH: 6.9 CM
RBC # BLD AUTO: 5.2 M/UL (ref 4.6–6.2)
RIGHT VENTRICULAR END-DIASTOLIC DIMENSION: 6.42 CM
RV TB RVSP: 19 MMHG
RV TISSUE DOPPLER FREE WALL SYSTOLIC VELOCITY 1 (APICAL 4 CHAMBER VIEW): 8.67 CM/S
SINUS: 3.59 CM
SODIUM SERPL-SCNC: 136 MMOL/L (ref 136–145)
STJ: 2.75 CM
TDI LATERAL: 0.05 M/S
TDI SEPTAL: 0.05 M/S
TDI: 0.05 M/S
TR MAX PG: 52 MMHG
TRICUSPID ANNULAR PLANE SYSTOLIC EXCURSION: 1.73 CM
TV REST PULMONARY ARTERY PRESSURE: 67 MMHG
WBC # BLD AUTO: 5.88 K/UL (ref 3.9–12.7)
Z-SCORE OF LEFT VENTRICULAR DIMENSION IN END DIASTOLE: -7.39
Z-SCORE OF LEFT VENTRICULAR DIMENSION IN END SYSTOLE: -2.16

## 2023-11-13 PROCEDURE — 25000003 PHARM REV CODE 250: Performed by: HOSPITALIST

## 2023-11-13 PROCEDURE — 36415 COLL VENOUS BLD VENIPUNCTURE: CPT | Performed by: HOSPITALIST

## 2023-11-13 PROCEDURE — 63600175 PHARM REV CODE 636 W HCPCS: Performed by: INTERNAL MEDICINE

## 2023-11-13 PROCEDURE — 63600175 PHARM REV CODE 636 W HCPCS: Performed by: HOSPITALIST

## 2023-11-13 PROCEDURE — 85025 COMPLETE CBC W/AUTO DIFF WBC: CPT | Performed by: HOSPITALIST

## 2023-11-13 PROCEDURE — 80053 COMPREHEN METABOLIC PANEL: CPT | Performed by: HOSPITALIST

## 2023-11-13 PROCEDURE — 99233 PR SUBSEQUENT HOSPITAL CARE,LEVL III: ICD-10-PCS | Mod: ,,, | Performed by: INTERNAL MEDICINE

## 2023-11-13 PROCEDURE — 99223 1ST HOSP IP/OBS HIGH 75: CPT | Mod: ,,, | Performed by: INTERNAL MEDICINE

## 2023-11-13 PROCEDURE — C9113 INJ PANTOPRAZOLE SODIUM, VIA: HCPCS | Performed by: HOSPITALIST

## 2023-11-13 PROCEDURE — 99233 SBSQ HOSP IP/OBS HIGH 50: CPT | Mod: ,,, | Performed by: INTERNAL MEDICINE

## 2023-11-13 PROCEDURE — 83605 ASSAY OF LACTIC ACID: CPT | Performed by: HOSPITALIST

## 2023-11-13 PROCEDURE — 11000001 HC ACUTE MED/SURG PRIVATE ROOM

## 2023-11-13 PROCEDURE — 99223 PR INITIAL HOSPITAL CARE,LEVL III: ICD-10-PCS | Mod: ,,, | Performed by: INTERNAL MEDICINE

## 2023-11-13 PROCEDURE — 99497 PR ADVNCD CARE PLAN 30 MIN: ICD-10-PCS | Mod: ,,, | Performed by: INTERNAL MEDICINE

## 2023-11-13 PROCEDURE — 27000221 HC OXYGEN, UP TO 24 HOURS

## 2023-11-13 PROCEDURE — 25000003 PHARM REV CODE 250: Performed by: INTERNAL MEDICINE

## 2023-11-13 PROCEDURE — 99497 PR ADVNCD CARE PLAN 30 MIN: ICD-10-PCS | Mod: 25,,, | Performed by: INTERNAL MEDICINE

## 2023-11-13 PROCEDURE — 25000003 PHARM REV CODE 250: Performed by: PHYSICIAN ASSISTANT

## 2023-11-13 PROCEDURE — 94761 N-INVAS EAR/PLS OXIMETRY MLT: CPT

## 2023-11-13 PROCEDURE — 99497 ADVNCD CARE PLAN 30 MIN: CPT | Mod: 25,,, | Performed by: INTERNAL MEDICINE

## 2023-11-13 PROCEDURE — 99497 ADVNCD CARE PLAN 30 MIN: CPT | Mod: ,,, | Performed by: INTERNAL MEDICINE

## 2023-11-13 RX ORDER — METOLAZONE 5 MG/1
5 TABLET ORAL DAILY
Status: DISCONTINUED | OUTPATIENT
Start: 2023-11-13 | End: 2023-11-14

## 2023-11-13 RX ADMIN — PRAVASTATIN SODIUM 80 MG: 40 TABLET ORAL at 08:11

## 2023-11-13 RX ADMIN — FUROSEMIDE 80 MG: 10 INJECTION, SOLUTION INTRAVENOUS at 05:11

## 2023-11-13 RX ADMIN — PANTOPRAZOLE SODIUM 40 MG: 40 INJECTION, POWDER, FOR SOLUTION INTRAVENOUS at 10:11

## 2023-11-13 RX ADMIN — ASPIRIN 81 MG CHEWABLE TABLET 81 MG: 81 TABLET CHEWABLE at 08:11

## 2023-11-13 RX ADMIN — HEPARIN SODIUM 5000 UNITS: 5000 INJECTION INTRAVENOUS; SUBCUTANEOUS at 05:11

## 2023-11-13 RX ADMIN — FUROSEMIDE 80 MG: 10 INJECTION, SOLUTION INTRAVENOUS at 02:11

## 2023-11-13 RX ADMIN — AMIODARONE HYDROCHLORIDE 200 MG: 200 TABLET ORAL at 08:11

## 2023-11-13 RX ADMIN — FUROSEMIDE 80 MG: 10 INJECTION, SOLUTION INTRAVENOUS at 10:11

## 2023-11-13 RX ADMIN — METOLAZONE 5 MG: 5 TABLET ORAL at 08:11

## 2023-11-13 RX ADMIN — HEPARIN SODIUM 5000 UNITS: 5000 INJECTION INTRAVENOUS; SUBCUTANEOUS at 02:11

## 2023-11-13 RX ADMIN — PANTOPRAZOLE SODIUM 40 MG: 40 INJECTION, POWDER, FOR SOLUTION INTRAVENOUS at 08:11

## 2023-11-13 RX ADMIN — HEPARIN SODIUM 5000 UNITS: 5000 INJECTION INTRAVENOUS; SUBCUTANEOUS at 10:11

## 2023-11-13 NOTE — PLAN OF CARE
Problem: Adult Inpatient Plan of Care  Goal: Plan of Care Review  Flowsheets (Taken 11/13/2023 1632)  Plan of Care Reviewed With: patient  Goal: Absence of Hospital-Acquired Illness or Injury  Intervention: Identify and Manage Fall Risk  Flowsheets (Taken 11/13/2023 1632)  Safety Promotion/Fall Prevention:   assistive device/personal item within reach   nonskid shoes/socks when out of bed   side rails raised x 2   instructed to call staff for mobility   bed alarm set   Fall Risk reviewed with patient/family   high risk medications identified   medications reviewed  Intervention: Prevent Skin Injury  Flowsheets (Taken 11/13/2023 1632)  Body Position:   position changed independently   weight shifting  Skin Protection:   adhesive use limited   tubing/devices free from skin contact  Intervention: Prevent and Manage VTE (Venous Thromboembolism) Risk  Flowsheets (Taken 11/13/2023 1632)  Activity Management:   Ankle pumps - L1   Arm raise - L1   Straight leg raise - L1   Rolling - L1   Sitting at edge of bed - L2  VTE Prevention/Management:   ambulation promoted   bleeding precations maintained   bleeding risk assessed  Range of Motion: active ROM (range of motion) encouraged  Intervention: Prevent Infection  Flowsheets (Taken 11/13/2023 1632)  Infection Prevention: hand hygiene promoted     Problem: Oral Intake Inadequate (Acute Kidney Injury/Impairment)  Goal: Optimal Nutrition Intake  Intervention: Promote and Optimize Nutrition  Flowsheets (Taken 11/13/2023 1632)  Oral Nutrition Promotion: safe use of adaptive equipment encouraged     Problem: Renal Function Impairment (Acute Kidney Injury/Impairment)  Goal: Effective Renal Function  Intervention: Monitor and Support Renal Function  Flowsheets (Taken 11/13/2023 1632)  Medication Review/Management:   medications reviewed   high-risk medications identified     Problem: Coping Ineffective  Goal: Effective Coping  Intervention: Support and Enhance Coping  Strategies  Flowsheets (Taken 11/13/2023 1632)  Supportive Measures:   active listening utilized   verbalization of feelings encouraged   positive reinforcement provided   self-responsibility promoted   self-reflection promoted   relaxation techniques promoted   self-care encouraged   problem-solving facilitated  Family/Support System Care: self-care encouraged  Environmental Support: calm environment promoted     Problem: Fall Injury Risk  Goal: Absence of Fall and Fall-Related Injury  Intervention: Identify and Manage Contributors  Flowsheets (Taken 11/13/2023 1632)  Self-Care Promotion:   independence encouraged   BADL personal objects within reach   BADL personal routines maintained   meal set-up provided   safe use of adaptive equipment encouraged  Medication Review/Management:   medications reviewed   high-risk medications identified  Intervention: Promote Injury-Free Environment  Flowsheets (Taken 11/13/2023 1632)  Safety Promotion/Fall Prevention:   assistive device/personal item within reach   nonskid shoes/socks when out of bed   side rails raised x 2   instructed to call staff for mobility   bed alarm set   Fall Risk reviewed with patient/family   high risk medications identified   medications reviewed     Problem: Nausea and Vomiting  Goal: Fluid and Electrolyte Balance  Intervention: Prevent and Manage Nausea and Vomiting  Flowsheets (Taken 11/13/2023 1632)  Environmental Support: calm environment promoted   Pt alert able to make needs known,luis meds well,no s/s adverse reaction noted,reposition self q 2hrs,pain controlled by prn pain medication,POC explained,remains free from falls and pressure injuries,safety maintained,continue monitoring.

## 2023-11-13 NOTE — HOSPITAL COURSE
Interval Hx: no cp/sob.  Looks weak.  NSVT noted overnight.    Tele: VPR  70s (personally reviewed and interpreted)

## 2023-11-13 NOTE — CONSULTS
[facility] Surg  Palliative Medicine  Consult Note    Patient Name: Papito Bhakta  MRN: [id number]  Admission Date: 11/10/2023  Hospital Length of Stay: 1 days  Code Status: Full Code   Attending Provider: Kelin Kennedy MD  Consulting Provider: Larissa Isabel MD  Primary Care Physician: Brynn To MD  Principal Problem:Acute on chronic combined systolic and diastolic CHF (congestive heart failure)    Patient information was obtained from patient, past medical records, ER records, and primary team.      Inpatient consult to Palliative Care  Consult performed by: Larissa Isabel MD  Consult ordered by: Sridhar Hathaway MD  Reason for consult: Advance Care Planning        Assessment/Plan:     Advance Care Planning    - Consult for introduction to palliative medicine and advance care planning in pt with severe cardiomyopathy (EF 15% on last echo, updated echo pending) currently in hospital with acute on chronic heart failure, and RIGOBERTO on CKD (likely cardiorenal syndrome). Chart reviewed; discussed pt in depth with primary staff, Dr Kennedy.   - Visited with pt at bedside; during entirety of visit, he was alert, polite, and readily conversational. Introduced role of palliative medicine, and learned more about pt outside of hospital. He has been  for several years, and has two children. He lives with his daughter Annie (preferred [facility] in the event he is unable to make his own medical decisions, son Papito Draper is alternate), as well as his 10 yo grandson. He has several grandchildren (some grown), and actually just recently became a great-grandfather!   - He was not able to serve in the  when younger due to being an only child, though had an interest in traveling and chose a career as a  to help facilitate this. 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   - Extensively discussed his underlying illnesses, and provided him with illness trajectory education. In discussing his overall care preferences, he said he recently talked about this with his daughter, and he told her "do whatever it takes" to keep him alive. His care preferences are consistent with maximal medical therapy, including maintaining full code status. Can further discuss this as his clinical course evolves, as he would likely have poor outcome with CPR and/or prolonged intubation.   - Updated primary team and cardiology after visit; our team will follow up with pt and family - will likely reach out to his daughter Annie tomorrow.     Pulmonary  Emphysema lung  - Noted underlying disease, though is not on home oxygen. Has a history of working as a .     Cardiac/Vascular  Acute on chronic combined systolic and diastolic CHF (congestive heart failure)  - Diuresis and management per primary team   - Discussed importance of healthy diet, taking all of his medications, and monitoring his fluid intake     NICM (nonischemic cardiomyopathy)  - EF 15-20% on last echo; updated echo pending   - Illness trajectory education provided to pt     Biventricular ICD (implantable cardioverter-defibrillator) in place  - Secondary to underlying HF     Renal/  Acute renal failure superimposed on stage 3a chronic kidney disease  - Likely cardiorenal syndrome; hopefully will improve with diuresis. Should he worsen to the point of needing RRT, he would be willing to undergo this.   - Illness trajectory education provided     Hematology  Thrombocytopenia, unspecified  - Monitor CBC per primary team     GI  Elevated LFTs  - Likely from congestive hepatopathy; monitoring per primary team     Thank you for your consult. I will follow-up with patient. Please contact us if you have any additional " "questions.    ANKIT Wong  Palliative Medicine Staff   [phone]    Total visit time: 86 minutes    > 50% of 70 min visit spent in chart review, face to face discussion of symptom assessment, coordination of care with other specialists, and discharge planning.    16 min ACP time spent: goals of care, emotional support, formulating and communicating prognosis, exploring burden/ benefit of various approaches of treatment.      Subjective:     HPI:   (From H&P) "Mr Papito Bhakta was placed in observation with acute on chronic systolic/diastolic CHF associated with cardiorenal syndrome/RIGOBERTO on CKD and congestive hepatopathy/elevated Tbili. Cause is increased dietary salt intake. Started IV lasix. Also with nausea, vomiting, with no abdominal pain/fever/diarrhea/hematemesis. Started PPI IV BID and antiemetics with improvement. Not diuresing well and renal/liver function worsening. Lactic up to 3.5, signifying developing cardiogenic shock. Cardiology following, increased diuretics."     Palliative medicine is consulted for advance care planning; for details of visit with pt, see ACP section of plan.        Past Medical History:   Diagnosis Date    RIGOBERTO (acute kidney injury) 10/7/2020    Anticoagulant long-term use     Aspirin    CHF (congestive heart failure)     Hyperlipidemia     Hypertension     NICM (nonischemic cardiomyopathy) 6/16/2020    Obesity     AMELIA (obstructive sleep apnea) 1/7/2022    Peripheral vascular disease, unspecified 2/1/2021       Past Surgical History:   Procedure Laterality Date    INSERTION OF BIVENTRICULAR IMPLANTABLE CARDIOVERTER-DEFIBRILLATOR (ICD) N/A 02/13/2019    Procedure: INSERTION, ICD, BIVENTRICULAR;  Surgeon: Shailesh Eng MD;  Location: [facility] CATH LAB;  Service: Cardiology;  Laterality: N/A;  RN PRE OP 2-6-19  1ST CASE PER  RADHA. NOTIFIED RADHA THAT ANESTHESIA IS NOT PITO FOR 1ST CASE START-LO    INSERTION OF BIVENTRICULAR IMPLANTABLE CARDIOVERTER-DEFIBRILLATOR (ICD) N/A " 2020    Procedure: INSERTION, ICD, BIVENTRICULAR;  Surgeon: Jim Kwong MD;  Location: [facility] EP LAB;  Service: Cardiology;  Laterality: N/A;  NICM, CRT-D, SJM,, MAC, DM, 3 Prep*Wearing LifeVest*    oral extraction  2018    TESTICLE SURGERY         Review of patient's allergies indicates:   Allergen Reactions    Ramipril      Leg swelling and gynecomastia     Losartan Rash       Medications:  Continuous Infusions:  Scheduled Meds:   amiodarone  200 mg Oral Daily    aspirin  81 mg Oral Daily    furosemide (LASIX) injection  80 mg Intravenous Q8H    heparin (porcine)  5,000 Units Subcutaneous Q8H    metOLazone  5 mg Oral Daily    pantoprazole  40 mg Intravenous BID    pravastatin  80 mg Oral Daily     PRN Meds:acetaminophen, melatonin, ondansetron, prochlorperazine, senna-docusate 8.6-50 mg, sodium chloride 0.9%    Family History       Problem Relation (Age of Onset)    Epilepsy Mother          Tobacco Use    Smoking status: Former     Current packs/day: 0.00     Average packs/day: 0.5 packs/day for 40.0 years (20.0 ttl pk-yrs)     Types: Cigarettes, Cigars     Start date:      Quit date:      Years since quittin.8     Passive exposure: Current    Smokeless tobacco: Never   Substance and Sexual Activity    Alcohol use: Yes     Comment: once a month beer or liquor    Drug use: No    Sexual activity: Yes     Birth control/protection: None     Comment: uses protection sometimes       Review of Systems   Respiratory:  Positive for shortness of breath.      Objective:     Vital Signs (Most Recent):  Temp: 97.5 °F (36.4 °C) (23 1115)  Pulse: 60 (23 1115)  Resp: 20 (23 1115)  BP: 117/77 (23 1115)  SpO2: 100 % (23 1115) Vital Signs (24h Range):  Temp:  [97.3 °F (36.3 °C)-97.9 °F (36.6 °C)] 97.5 °F (36.4 °C)  Pulse:  [60-72] 60  Resp:  [18-23] 20  SpO2:  [98 %-100 %] 100 %  BP: ()/(54-79) 117/77     Weight: 125 kg (275 lb 9.2 oz)  Body mass index is 32.68 kg/m².        Physical Exam  Constitutional:       Comments: Appears older than stated age and chronically ill, polite, conversational    Cardiovascular:      Rate and Rhythm: Normal rate.   Pulmonary:      Effort: Pulmonary effort is normal.      Comments: NC oxygen   Neurological:      General: No focal deficit present.      Mental Status: He is alert and oriented to person, place, and time.         Significant Labs: All pertinent labs within the past 24 hours have been reviewed.  CBC:   Recent Labs   Lab 11/13/23  0539   WBC 5.88   HGB 11.8*   HCT 37.5*   MCV 72*   *     BMP:  Recent Labs   Lab 11/13/23  0539   GLU 97      K 4.2      CO2 21*   BUN 39*   CREATININE 2.1*   CALCIUM 9.4     LFT:  Lab Results   Component Value Date     (H) 11/13/2023    ALKPHOS 85 11/13/2023    BILITOT 5.5 (H) 11/13/2023     Albumin:   Albumin   Date Value Ref Range Status   11/13/2023 3.0 (L) 3.5 - 5.2 g/dL Final     Protein:   Total Protein   Date Value Ref Range Status   11/13/2023 6.8 6.0 - 8.4 g/dL Final     Lactic acid:   Lab Results   Component Value Date    LACTATE 2.5 (H) 11/13/2023    LACTATE 3.5 (HH) 11/12/2023       Significant Imaging: I have reviewed all pertinent imaging results/findings within the past 24 hours.

## 2023-11-13 NOTE — SUBJECTIVE & OBJECTIVE
Interval History: Feeling better today, no shortness of breath, nausea, vomiting, abdominal pain. Still has leg swelling, less tense than prior.     Review of Systems   Constitutional:  Negative for chills and fever.   Respiratory:  Negative for cough, chest tightness, shortness of breath and wheezing.    Cardiovascular:  Positive for leg swelling. Negative for chest pain and palpitations.   Gastrointestinal:  Negative for abdominal distention, abdominal pain, blood in stool, constipation, diarrhea, nausea and vomiting.   Genitourinary:  Negative for difficulty urinating.   Psychiatric/Behavioral:  Negative for confusion.      Objective:     Vital Signs (Most Recent):  Temp: 97.9 °F (36.6 °C) (11/13/23 0800)  Pulse: 71 (11/13/23 0827)  Resp: 19 (11/13/23 0827)  BP: 130/79 (11/13/23 0800)  SpO2: 98 % (11/13/23 0827) Vital Signs (24h Range):  Temp:  [97.3 °F (36.3 °C)-97.9 °F (36.6 °C)] 97.9 °F (36.6 °C)  Pulse:  [60-72] 71  Resp:  [18-23] 19  SpO2:  [96 %-100 %] 98 %  BP: ()/(54-79) 130/79     Weight: 125 kg (275 lb 9.2 oz)  Body mass index is 32.68 kg/m².    Intake/Output Summary (Last 24 hours) at 11/13/2023 1041  Last data filed at 11/13/2023 0800  Gross per 24 hour   Intake 220 ml   Output 750 ml   Net -530 ml           Physical Exam  Vitals and nursing note reviewed.   Constitutional:       General: He is not in acute distress.     Appearance: He is obese. He is ill-appearing (chronically). He is not toxic-appearing.   HENT:      Head: Normocephalic and atraumatic.      Nose: Nose normal.      Mouth/Throat:      Mouth: Mucous membranes are moist.   Eyes:      General: Scleral icterus present.   Cardiovascular:      Rate and Rhythm: Normal rate and regular rhythm.      Comments: R chest BiV ICD  Pulmonary:      Effort: Pulmonary effort is normal.      Breath sounds: No wheezing or rhonchi.      Comments: 3L NC  Abdominal:      General: Bowel sounds are normal. There is no distension.      Palpations:  Abdomen is soft. There is no mass.      Tenderness: There is no abdominal tenderness. There is no guarding or rebound.   Musculoskeletal:      Right lower leg: Edema present.      Left lower leg: Edema present.   Skin:     General: Skin is warm and dry.   Neurological:      Mental Status: He is alert. Mental status is at baseline.             Significant Labs: All pertinent labs within the past 24 hours have been reviewed.    Significant Imaging: I have reviewed all pertinent imaging results/findings within the past 24 hours.

## 2023-11-13 NOTE — ASSESSMENT & PLAN NOTE
- Likely cardiorenal syndrome; hopefully will improve with diuresis. Should he worsen to the point of needing RRT, he would be willing to undergo this.   - Illness trajectory education provided

## 2023-11-13 NOTE — ASSESSMENT & PLAN NOTE
- Diuresis and management per primary team   - Discussed importance of healthy diet, taking all of his medications, and monitoring his fluid intake

## 2023-11-13 NOTE — PROGRESS NOTES
[facility] Surg  Cardiology  Progress Note    Patient Name: Papito Bhakta  MRN: [id number]  Admission Date: 11/10/2023  Hospital Length of Stay: 1 days  Code Status: Full Code   Attending Physician: Kelin Kennedy MD   Primary Care Physician: Brynn To MD  Expected Discharge Date: 11/13/2023  Principal Problem:Acute on chronic combined systolic and diastolic CHF (congestive heart failure)    Subjective:     Interval Hx: no cp/sob.  Case d/w Dr. Kennedy.    Tele: SR 70s, VO (personally reviewed and interpreted)      Past Medical History:   Diagnosis Date    RIGOBERTO (acute kidney injury) 10/7/2020    Anticoagulant long-term use     Aspirin    CHF (congestive heart failure)     Hyperlipidemia     Hypertension     NICM (nonischemic cardiomyopathy) 6/16/2020    Obesity     AMELIA (obstructive sleep apnea) 1/7/2022    Peripheral vascular disease, unspecified 2/1/2021       Past Surgical History:   Procedure Laterality Date    INSERTION OF BIVENTRICULAR IMPLANTABLE CARDIOVERTER-DEFIBRILLATOR (ICD) N/A 02/13/2019    Procedure: INSERTION, ICD, BIVENTRICULAR;  Surgeon: Shailesh Eng MD;  Location: [facility] CATH LAB;  Service: Cardiology;  Laterality: N/A;  RN PRE OP 2-6-19  1ST CASE PER  RADHA. NOTIFIED RADHA THAT ANESTHESIA IS NOT PITO FOR 1ST CASE START-LO    INSERTION OF BIVENTRICULAR IMPLANTABLE CARDIOVERTER-DEFIBRILLATOR (ICD) N/A 09/28/2020    Procedure: INSERTION, ICD, BIVENTRICULAR;  Surgeon: Jim Kwong MD;  Location: [facility] EP LAB;  Service: Cardiology;  Laterality: N/A;  NICM, CRT-D, SJM,, MAC, DM, 3 Prep*Wearing LifeVest*    oral extraction  11/2018    TESTICLE SURGERY         Review of patient's allergies indicates:   Allergen Reactions    Ramipril      Leg swelling and gynecomastia     Losartan Rash       No current facility-administered medications on file prior to encounter.     Current Outpatient Medications on File Prior to Encounter   Medication Sig    acetaminophen (TYLENOL) 500 MG tablet Take 2  tablets (1,000 mg total) by mouth every 8 (eight) hours as needed for Pain or Temperature greater than (100.5).    amiodarone (PACERONE) 200 MG Tab Take 1 tablet (200 mg total) by mouth once daily.    aspirin (ECOTRIN) 325 MG EC tablet Take 1 tablet (325 mg total) by mouth once daily.    empagliflozin (JARDIANCE) 10 mg tablet Take 1 tablet (10 mg total) by mouth once daily.    furosemide (LASIX) 80 MG tablet TAKE 1 TABLET EVERY DAY    gabapentin (NEURONTIN) 100 MG capsule Take 1 capsule (100 mg total) by mouth 3 (three) times daily. Take 100 mg by mouth 3 (three) times daily.    hydrocortisone 1 % cream Apply topically 2 (two) times daily.    losartan (COZAAR) 25 MG tablet Take 25 mg by mouth once daily.    meloxicam (MOBIC) 15 MG tablet Take 1 tablet (15 mg total) by mouth daily as needed for Pain.    methocarbamoL (ROBAXIN) 500 MG Tab Take 2 tablets (1,000 mg total) by mouth 3 (three) times daily as needed (Pain not improved by other medications).    metoprolol succinate (TOPROL-XL) 50 MG 24 hr tablet TAKE 1 TABLET EVERY DAY    potassium chloride (K-TAB) 20 mEq TAKE 1 TABLET EVERY DAY    pravastatin (PRAVACHOL) 80 MG tablet TAKE 1 TABLET EVERY DAY    spironolactone (ALDACTONE) 25 MG tablet TAKE 1/2 TABLET (12.5 MG TOTAL) ONCE DAILY. HOLD IF SYSTOLIC BLOOD PRESSURE IS LESS THAN 110    [DISCONTINUED] ramipril (ALTACE) 10 MG capsule Take 1 capsule (10 mg total) by mouth once daily.     Family History       Problem Relation (Age of Onset)    Epilepsy Mother          Tobacco Use    Smoking status: Former     Current packs/day: 0.00     Average packs/day: 0.5 packs/day for 40.0 years (20.0 ttl pk-yrs)     Types: Cigarettes, Cigars     Start date:      Quit date:      Years since quittin.8     Passive exposure: Current    Smokeless tobacco: Never   Substance and Sexual Activity    Alcohol use: Yes     Comment: once a month beer or liquor    Drug use: No    Sexual activity: Yes     Birth control/protection:  None     Comment: uses protection sometimes     Review of Systems   Gastrointestinal:  Negative for melena.   Genitourinary:  Negative for hematuria.     Objective:     Vital Signs (Most Recent):  Temp: 97.3 °F (36.3 °C) (11/13/23 0530)  Pulse: 70 (11/13/23 0530)  Resp: (!) 23 (11/13/23 0530)  BP: 104/68 (11/13/23 0530)  SpO2: 99 % (11/13/23 0530) Vital Signs (24h Range):  Temp:  [97.3 °F (36.3 °C)-97.9 °F (36.6 °C)] 97.3 °F (36.3 °C)  Pulse:  [60-70] 70  Resp:  [18-23] 23  SpO2:  [96 %-100 %] 99 %  BP: ()/(54-75) 104/68     Weight: 125 kg (275 lb 9.2 oz)  Body mass index is 32.68 kg/m².    SpO2: 99 %         Intake/Output Summary (Last 24 hours) at 11/13/2023 0759  Last data filed at 11/13/2023 0636  Gross per 24 hour   Intake 200 ml   Output 900 ml   Net -700 ml         Lines/Drains/Airways       Peripheral Intravenous Line  Duration                  Peripheral IV - Single Lumen 11/10/23 1659 20 G Left Antecubital 2 days                   Exam unchanged vs 11/12/23  Physical Exam  Constitutional:       General: He is not in acute distress.     Appearance: He is well-developed. He is obese. He is not ill-appearing, toxic-appearing or diaphoretic.   HENT:      Head: Normocephalic and atraumatic.   Eyes:      General: No scleral icterus.     Extraocular Movements: Extraocular movements intact.      Conjunctiva/sclera: Conjunctivae normal.      Pupils: Pupils are equal, round, and reactive to light.   Neck:      Thyroid: No thyromegaly.      Vascular: JVD present.      Trachea: No tracheal deviation.   Cardiovascular:      Rate and Rhythm: Normal rate and regular rhythm.      Heart sounds: S1 normal and S2 normal. Heart sounds are distant. No murmur heard.     No friction rub. No gallop.   Pulmonary:      Effort: Pulmonary effort is normal. No respiratory distress.      Breath sounds: Normal breath sounds. No stridor. No wheezing, rhonchi or rales.   Chest:      Chest wall: No tenderness.   Abdominal:       General: There is no distension.      Palpations: Abdomen is soft.   Musculoskeletal:         General: No tenderness. Normal range of motion.      Cervical back: Normal range of motion and neck supple. No rigidity.      Right lower leg: Edema present.      Left lower leg: Edema present.   Skin:     General: Skin is warm and dry.      Coloration: Skin is not jaundiced.   Neurological:      General: No focal deficit present.      Mental Status: He is alert and oriented to person, place, and time.      Cranial Nerves: No cranial nerve deficit.   Psychiatric:         Mood and Affect: Mood normal.         Behavior: Behavior normal.          Current Medications:   amiodarone  200 mg Oral Daily    aspirin  81 mg Oral Daily    furosemide (LASIX) injection  80 mg Intravenous Q8H    heparin (porcine)  5,000 Units Subcutaneous Q8H    pantoprazole  40 mg Intravenous BID    pravastatin  80 mg Oral Daily       acetaminophen, melatonin, ondansetron, prochlorperazine, senna-docusate 8.6-50 mg, sodium chloride 0.9%    Laboratory (all labs reviewed):  CBC:  Recent Labs   Lab 07/18/22  1251 05/12/23  1622 11/10/23  2146 11/12/23  0408 11/13/23  0539   WBC 5.01 4.68 5.15 5.17 5.88   Hemoglobin 13.7 L 12.8 L 12.9 L 12.3 L 11.8 L   Hematocrit 46.4 39.8 L 41.6 41.5 37.5 L   Platelets 157 145 L 116 L 130 L 128 L         CHEMISTRIES:  Recent Labs   Lab 11/19/20  0300 11/20/20  0243 11/21/20  0622 03/11/21  1009 03/31/21  1808 09/15/21  0940 07/18/22  1251 08/05/22  1119 08/25/23  0940 11/10/23  2146 11/11/23  0344 11/12/23  0408 11/13/23  0539   Glucose 89 84 90 88 90 90 94   < > 79 90 82 77 97   Sodium 142 142 142 140 141 137 138   < > 138 139 138 137 136   Potassium 3.1 L 3.2 L 3.5 4.7 4.3 4.5 4.6   < > 4.0 4.3 4.1 4.5 4.2   BUN 16 14 13 27 H 16 15 21   < > 21 33 H 33 H 36 H 39 H   Creatinine 1.1 0.9 0.8 1.5 H 1.3 1.2 1.5 H   < > 1.4 1.9 H 1.8 H 2.1 H 2.1 H   eGFR if non African American >60.0 >60.0 >60.0 48 A 57.3 A >60.0 47.5 A  --   --    --   --   --   --    eGFR  --   --   --   --   --   --   --    < > 54.7 A 38 A 40 A 34 A 34 A   Calcium 8.7 8.8 9.0 9.1 9.0 9.4 9.5   < > 9.2 9.8 9.6 9.7 9.4   Magnesium 1.8 1.7 1.7  --   --   --   --   --   --  2.0  --   --   --     < > = values in this interval not displayed.         CARDIAC BIOMARKERS:  Recent Labs   Lab 11/10/23  2146 11/11/23  0344   Troponin I 0.014 0.028 H         COAGS:  Recent Labs   Lab 03/31/21 1808 04/02/21  1157 11/11/23  0344   INR 1.0 1.0 1.7 H         LIPIDS/LFTS:  Recent Labs   Lab 11/18/20  1058 03/31/21 1808 07/18/22  1251 10/14/22  1020 08/25/23  0940 09/14/23  1625 11/10/23  2146 11/11/23  0344 11/12/23  0408 11/13/23  0539   Cholesterol 143  --  148  --   --  103 L  --   --   --   --    Triglycerides 82  --  90  --   --  51  --   --   --   --    HDL 49  --  51  --   --  35 L  --   --   --   --    LDL Cholesterol 77.6  --  79.0  --   --  57.8 L  --   --   --   --    Non-HDL Cholesterol 94  --  97  --   --  68  --   --   --   --    AST 15   < > 15   < > 19  --  46 H 53 H 74 H 104 H   ALT 9 L   < > 9 L   < > 16  --  46 H 47 H 61 H 84 H    < > = values in this interval not displayed.         BNP:  Recent Labs   Lab 11/18/20  1058 08/25/23  0940 11/10/23  2146    H 525 H 1,592 H         TSH:  Recent Labs   Lab 05/12/21  0929 07/18/22  1251 09/14/23  1625   TSH 3.421 1.729 2.764         Free T4:            Diagnostic Results:  ECG (personally reviewed and interpreted tracing(s)):  11/10/23 1746 AS-BiV paced 66    Chest X-Ray (personally reviewed and interpreted image(s)): 11/10/23 CHF, CMeg, R PPM 3 leads    Echo: 8/18/23 (repeat pending)    Left Ventricle: The left ventricle is severely dilated. Increased ventricular mass. Normal wall thickness. Global hypokinesis present. There is severely reduced systolic function with a visually estimated ejection fraction of 15 - 20%. There is diastolic dysfunction.    Left Atrium: Left atrium is severely dilated.    Right Ventricle:  Severe right ventricular enlargement. Wall thickness is normal. Systolic function is moderately reduced. Pacemaker lead present in the ventricle.    Right Atrium: Right atrium is dilated.    Mitral Valve: Mild mitral annular calcification. There is moderate regurgitation.    Tricuspid Valve: There is mild regurgitation.    Pulmonic Valve: There is mild regurgitation.    Pulmonary Artery: The estimated pulmonary artery systolic pressure is 59 mmHg.    IVC/SVC: Elevated venous pressure at 15 mmHg.    Cath: 2/1/18    Normal coronary arteries.     Systolic dysfunction.     Low right and left Filling Pressures.     Mild Pulmonary Hypertension.     Assessment and Plan:     * Acute on chronic combined systolic and diastolic CHF (congestive heart failure)  Known severe NICM (neg cath 2018) s/p BiV ICD  Prev intol of ACE/ARB, entresto was too pricey  Now with progressive CHF and elev creat c/w cardiorenal syndrome  Cont lasix 80mg iv tid  Add metolazone  Consider lasix gtt +/-  as contingency  Eventual hydrala/Imdur if BP will allow  Hold BBL/aldactone  Check echo  Consider as a candidate for CardioMEMS implant  Palliative care consult    NICM (nonischemic cardiomyopathy)  As above    Acute renal failure superimposed on stage 3a chronic kidney disease  As above  Creat stable at 2.1    Essential hypertension  As above    Hyperlipidemia  Cont statin    Biventricular ICD (implantable cardioverter-defibrillator) in place  As above, appears to be functioning normally    Advance care planning  Palliative care consult ordered.    20 minute(s) ACP time spent: Goals of care, emotional support, formulating and communicating prognosis, exploring burden/benefits of various approaches of treatment.          VTE Risk Mitigation (From admission, onward)           Ordered     heparin (porcine) injection 5,000 Units  Every 8 hours         11/11/23 0913     IP VTE HIGH RISK PATIENT  Once         11/10/23 2117     Place sequential  compression device  Until discontinued         11/10/23 2117                    Sridhar Hathaway MD  Cardiology  [facility] Surg

## 2023-11-13 NOTE — PLAN OF CARE
Problem: Adult Inpatient Plan of Care  Goal: Patient-Specific Goal (Individualized)  11/13/2023 0425 by Blake Yap RN  Outcome: Ongoing, Progressing  11/13/2023 0425 by Blake Yap RN  Outcome: Ongoing, Progressing  11/13/2023 0423 by Blake Yap RN  Outcome: Ongoing, Progressing  Goal: Absence of Hospital-Acquired Illness or Injury  11/13/2023 0425 by Blake Yap RN  Outcome: Ongoing, Progressing  11/13/2023 0425 by Blake Yap RN  Outcome: Ongoing, Progressing  11/13/2023 0423 by Blake Yap RN  Outcome: Ongoing, Progressing  Goal: Optimal Comfort and Wellbeing  11/13/2023 0425 by Blake Yap RN  Outcome: Ongoing, Progressing  11/13/2023 0425 by Blake Yap RN  Outcome: Ongoing, Progressing  11/13/2023 0423 by Blake Yap RN  Outcome: Ongoing, Progressing     Problem: Oral Intake Inadequate (Acute Kidney Injury/Impairment)  Goal: Optimal Nutrition Intake  Outcome: Ongoing, Progressing     Problem: Fall Injury Risk  Goal: Absence of Fall and Fall-Related Injury  11/13/2023 0425 by Blake Yap RN  Outcome: Ongoing, Progressing  11/13/2023 0425 by Blake Yap RN  Outcome: Ongoing, Progressing     Problem: Gas Exchange Impaired  Goal: Optimal Gas Exchange  Outcome: Ongoing, Progressing

## 2023-11-13 NOTE — HPI
"(From H&P) "Mr Papito Bhakta was placed in observation with acute on chronic systolic/diastolic CHF associated with cardiorenal syndrome/RIGOBERTO on CKD and congestive hepatopathy/elevated Tbili. Cause is increased dietary salt intake. Started IV lasix. Also with nausea, vomiting, with no abdominal pain/fever/diarrhea/hematemesis. Started PPI IV BID and antiemetics with improvement. Not diuresing well and renal/liver function worsening. Lactic up to 3.5, signifying developing cardiogenic shock. Cardiology following, increased diuretics."     Palliative medicine is consulted for advance care planning; for details of visit with pt, see ACP section of plan.    "
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
## 2023-11-13 NOTE — NURSING
[facility]  Nurses Note -- 4 Eyes      11/12/2023       Skin assessed on: Q Shift      [x] No Pressure Injuries Present    []Prevention Measures Documented    [] Yes LDA  for Pressure Injury Previously documented     [] Yes New Pressure Injury Discovered   [] LDA for New Pressure Injury Added      Attending RN:  Blake Yap RN     Second RN:  LAUREN Alvarez        show

## 2023-11-13 NOTE — ASSESSMENT & PLAN NOTE
Lab Results   Component Value Date    INR 1.7 (H) 11/11/2023    INR 1.0 04/02/2021    INR 1.0 03/31/2021     - with elevated TBili and low plts, concern for liver disease  - abdominal US no cirrhosis  - suspect congestive hepatopathy  - no signs of bleeding

## 2023-11-13 NOTE — SUBJECTIVE & OBJECTIVE
Past Medical History:   Diagnosis Date    RIGOBERTO (acute kidney injury) 10/7/2020    Anticoagulant long-term use     Aspirin    CHF (congestive heart failure)     Hyperlipidemia     Hypertension     NICM (nonischemic cardiomyopathy) 6/16/2020    Obesity     AMELIA (obstructive sleep apnea) 1/7/2022    Peripheral vascular disease, unspecified 2/1/2021       Past Surgical History:   Procedure Laterality Date    INSERTION OF BIVENTRICULAR IMPLANTABLE CARDIOVERTER-DEFIBRILLATOR (ICD) N/A 02/13/2019    Procedure: INSERTION, ICD, BIVENTRICULAR;  Surgeon: Shailseh Eng MD;  Location: [facility] CATH LAB;  Service: Cardiology;  Laterality: N/A;  RN PRE OP 2-6-19  1ST CASE PER  RADHA. NOTIFIED RADHA THAT ANESTHESIA IS NOT PITO FOR 1ST CASE START-LO    INSERTION OF BIVENTRICULAR IMPLANTABLE CARDIOVERTER-DEFIBRILLATOR (ICD) N/A 09/28/2020    Procedure: INSERTION, ICD, BIVENTRICULAR;  Surgeon: Jim Kwong MD;  Location: [facility] EP LAB;  Service: Cardiology;  Laterality: N/A;  NICM, CRT-D, SJM,, MAC, DM, 3 Prep*Wearing LifeVest*    oral extraction  11/2018    TESTICLE SURGERY         Review of patient's allergies indicates:   Allergen Reactions    Ramipril      Leg swelling and gynecomastia     Losartan Rash       Medications:  Continuous Infusions:  Scheduled Meds:   amiodarone  200 mg Oral Daily    aspirin  81 mg Oral Daily    furosemide (LASIX) injection  80 mg Intravenous Q8H    heparin (porcine)  5,000 Units Subcutaneous Q8H    metOLazone  5 mg Oral Daily    pantoprazole  40 mg Intravenous BID    pravastatin  80 mg Oral Daily     PRN Meds:acetaminophen, melatonin, ondansetron, prochlorperazine, senna-docusate 8.6-50 mg, sodium chloride 0.9%    Family History       Problem Relation (Age of Onset)    Epilepsy Mother          Tobacco Use    Smoking status: Former     Current packs/day: 0.00     Average packs/day: 0.5 packs/day for 40.0 years (20.0 ttl pk-yrs)     Types: Cigarettes, Cigars     Start date: 1974     Quit date: 2014      Years since quittin.8     Passive exposure: Current    Smokeless tobacco: Never   Substance and Sexual Activity    Alcohol use: Yes     Comment: once a month beer or liquor    Drug use: No    Sexual activity: Yes     Birth control/protection: None     Comment: uses protection sometimes       Review of Systems   Respiratory:  Positive for shortness of breath.      Objective:     Vital Signs (Most Recent):  Temp: 97.5 °F (36.4 °C) (23 1115)  Pulse: 60 (23 1115)  Resp: 20 (23 111)  BP: 117/77 (23 1115)  SpO2: 100 % (23 111) Vital Signs (24h Range):  Temp:  [97.3 °F (36.3 °C)-97.9 °F (36.6 °C)] 97.5 °F (36.4 °C)  Pulse:  [60-72] 60  Resp:  [18-23] 20  SpO2:  [98 %-100 %] 100 %  BP: ()/(54-79) 117/77     Weight: 125 kg (275 lb 9.2 oz)  Body mass index is 32.68 kg/m².       Physical Exam  Constitutional:       Comments: Appears older than stated age and chronically ill, polite, conversational    Cardiovascular:      Rate and Rhythm: Normal rate.   Pulmonary:      Effort: Pulmonary effort is normal.      Comments: NC oxygen   Neurological:      General: No focal deficit present.      Mental Status: He is alert and oriented to person, place, and time.         Significant Labs: All pertinent labs within the past 24 hours have been reviewed.  CBC:   Recent Labs   Lab 23  0539   WBC 5.88   HGB 11.8*   HCT 37.5*   MCV 72*   *     BMP:  Recent Labs   Lab 23  0539   GLU 97      K 4.2      CO2 21*   BUN 39*   CREATININE 2.1*   CALCIUM 9.4     LFT:  Lab Results   Component Value Date     (H) 2023    ALKPHOS 85 2023    BILITOT 5.5 (H) 2023     Albumin:   Albumin   Date Value Ref Range Status   2023 3.0 (L) 3.5 - 5.2 g/dL Final     Protein:   Total Protein   Date Value Ref Range Status   2023 6.8 6.0 - 8.4 g/dL Final     Lactic acid:   Lab Results   Component Value Date    LACTATE 2.5 (H) 2023    LACTATE 3.5 (HH)  11/12/2023       Significant Imaging: I have reviewed all pertinent imaging results/findings within the past 24 hours.

## 2023-11-13 NOTE — SUBJECTIVE & OBJECTIVE
Past Medical History:   Diagnosis Date    RIGOBERTO (acute kidney injury) 10/7/2020    Anticoagulant long-term use     Aspirin    CHF (congestive heart failure)     Hyperlipidemia     Hypertension     NICM (nonischemic cardiomyopathy) 6/16/2020    Obesity     AMELIA (obstructive sleep apnea) 1/7/2022    Peripheral vascular disease, unspecified 2/1/2021       Past Surgical History:   Procedure Laterality Date    INSERTION OF BIVENTRICULAR IMPLANTABLE CARDIOVERTER-DEFIBRILLATOR (ICD) N/A 02/13/2019    Procedure: INSERTION, ICD, BIVENTRICULAR;  Surgeon: Shailesh Eng MD;  Location: [facility] CATH LAB;  Service: Cardiology;  Laterality: N/A;  RN PRE OP 2-6-19  1ST CASE PER  RADHA. NOTIFIED RADHA THAT ANESTHESIA IS NOT PITO FOR 1ST CASE START-LO    INSERTION OF BIVENTRICULAR IMPLANTABLE CARDIOVERTER-DEFIBRILLATOR (ICD) N/A 09/28/2020    Procedure: INSERTION, ICD, BIVENTRICULAR;  Surgeon: Jim Kwong MD;  Location: [facility] EP LAB;  Service: Cardiology;  Laterality: N/A;  NICM, CRT-D, SJM,, MAC, DM, 3 Prep*Wearing LifeVest*    oral extraction  11/2018    TESTICLE SURGERY         Review of patient's allergies indicates:   Allergen Reactions    Ramipril      Leg swelling and gynecomastia     Losartan Rash       No current facility-administered medications on file prior to encounter.     Current Outpatient Medications on File Prior to Encounter   Medication Sig    acetaminophen (TYLENOL) 500 MG tablet Take 2 tablets (1,000 mg total) by mouth every 8 (eight) hours as needed for Pain or Temperature greater than (100.5).    amiodarone (PACERONE) 200 MG Tab Take 1 tablet (200 mg total) by mouth once daily.    aspirin (ECOTRIN) 325 MG EC tablet Take 1 tablet (325 mg total) by mouth once daily.    empagliflozin (JARDIANCE) 10 mg tablet Take 1 tablet (10 mg total) by mouth once daily.    furosemide (LASIX) 80 MG tablet TAKE 1 TABLET EVERY DAY    gabapentin (NEURONTIN) 100 MG capsule Take 1 capsule (100 mg total) by mouth 3 (three) times  daily. Take 100 mg by mouth 3 (three) times daily.    hydrocortisone 1 % cream Apply topically 2 (two) times daily.    losartan (COZAAR) 25 MG tablet Take 25 mg by mouth once daily.    meloxicam (MOBIC) 15 MG tablet Take 1 tablet (15 mg total) by mouth daily as needed for Pain.    methocarbamoL (ROBAXIN) 500 MG Tab Take 2 tablets (1,000 mg total) by mouth 3 (three) times daily as needed (Pain not improved by other medications).    metoprolol succinate (TOPROL-XL) 50 MG 24 hr tablet TAKE 1 TABLET EVERY DAY    potassium chloride (K-TAB) 20 mEq TAKE 1 TABLET EVERY DAY    pravastatin (PRAVACHOL) 80 MG tablet TAKE 1 TABLET EVERY DAY    spironolactone (ALDACTONE) 25 MG tablet TAKE 1/2 TABLET (12.5 MG TOTAL) ONCE DAILY. HOLD IF SYSTOLIC BLOOD PRESSURE IS LESS THAN 110    [DISCONTINUED] ramipril (ALTACE) 10 MG capsule Take 1 capsule (10 mg total) by mouth once daily.     Family History       Problem Relation (Age of Onset)    Epilepsy Mother          Tobacco Use    Smoking status: Former     Current packs/day: 0.00     Average packs/day: 0.5 packs/day for 40.0 years (20.0 ttl pk-yrs)     Types: Cigarettes, Cigars     Start date:      Quit date:      Years since quittin.8     Passive exposure: Current    Smokeless tobacco: Never   Substance and Sexual Activity    Alcohol use: Yes     Comment: once a month beer or liquor    Drug use: No    Sexual activity: Yes     Birth control/protection: None     Comment: uses protection sometimes     Review of Systems   Gastrointestinal:  Negative for melena.   Genitourinary:  Negative for hematuria.     Objective:     Vital Signs (Most Recent):  Temp: 97.3 °F (36.3 °C) (23)  Pulse: 70 (23)  Resp: (!) 23 (23)  BP: 104/68 (23)  SpO2: 99 % (23) Vital Signs (24h Range):  Temp:  [97.3 °F (36.3 °C)-97.9 °F (36.6 °C)] 97.3 °F (36.3 °C)  Pulse:  [60-70] 70  Resp:  [18-23] 23  SpO2:  [96 %-100 %] 99 %  BP: ()/(54-75) 104/68      Weight: 125 kg (275 lb 9.2 oz)  Body mass index is 32.68 kg/m².    SpO2: 99 %         Intake/Output Summary (Last 24 hours) at 11/13/2023 0759  Last data filed at 11/13/2023 0636  Gross per 24 hour   Intake 200 ml   Output 900 ml   Net -700 ml         Lines/Drains/Airways       Peripheral Intravenous Line  Duration                  Peripheral IV - Single Lumen 11/10/23 1659 20 G Left Antecubital 2 days                   Exam unchanged vs 11/12/23  Physical Exam  Constitutional:       General: He is not in acute distress.     Appearance: He is well-developed. He is obese. He is not ill-appearing, toxic-appearing or diaphoretic.   HENT:      Head: Normocephalic and atraumatic.   Eyes:      General: No scleral icterus.     Extraocular Movements: Extraocular movements intact.      Conjunctiva/sclera: Conjunctivae normal.      Pupils: Pupils are equal, round, and reactive to light.   Neck:      Thyroid: No thyromegaly.      Vascular: JVD present.      Trachea: No tracheal deviation.   Cardiovascular:      Rate and Rhythm: Normal rate and regular rhythm.      Heart sounds: S1 normal and S2 normal. Heart sounds are distant. No murmur heard.     No friction rub. No gallop.   Pulmonary:      Effort: Pulmonary effort is normal. No respiratory distress.      Breath sounds: Normal breath sounds. No stridor. No wheezing, rhonchi or rales.   Chest:      Chest wall: No tenderness.   Abdominal:      General: There is no distension.      Palpations: Abdomen is soft.   Musculoskeletal:         General: No tenderness. Normal range of motion.      Cervical back: Normal range of motion and neck supple. No rigidity.      Right lower leg: Edema present.      Left lower leg: Edema present.   Skin:     General: Skin is warm and dry.      Coloration: Skin is not jaundiced.   Neurological:      General: No focal deficit present.      Mental Status: He is alert and oriented to person, place, and time.      Cranial Nerves: No cranial nerve  deficit.   Psychiatric:         Mood and Affect: Mood normal.         Behavior: Behavior normal.          Current Medications:   amiodarone  200 mg Oral Daily    aspirin  81 mg Oral Daily    furosemide (LASIX) injection  80 mg Intravenous Q8H    heparin (porcine)  5,000 Units Subcutaneous Q8H    pantoprazole  40 mg Intravenous BID    pravastatin  80 mg Oral Daily       acetaminophen, melatonin, ondansetron, prochlorperazine, senna-docusate 8.6-50 mg, sodium chloride 0.9%    Laboratory (all labs reviewed):  CBC:  Recent Labs   Lab 07/18/22  1251 05/12/23  1622 11/10/23  2146 11/12/23  0408 11/13/23  0539   WBC 5.01 4.68 5.15 5.17 5.88   Hemoglobin 13.7 L 12.8 L 12.9 L 12.3 L 11.8 L   Hematocrit 46.4 39.8 L 41.6 41.5 37.5 L   Platelets 157 145 L 116 L 130 L 128 L         CHEMISTRIES:  Recent Labs   Lab 11/19/20  0300 11/20/20  0243 11/21/20  0622 03/11/21  1009 03/31/21  1808 09/15/21  0940 07/18/22  1251 08/05/22  1119 08/25/23  0940 11/10/23  2146 11/11/23  0344 11/12/23  0408 11/13/23  0539   Glucose 89 84 90 88 90 90 94   < > 79 90 82 77 97   Sodium 142 142 142 140 141 137 138   < > 138 139 138 137 136   Potassium 3.1 L 3.2 L 3.5 4.7 4.3 4.5 4.6   < > 4.0 4.3 4.1 4.5 4.2   BUN 16 14 13 27 H 16 15 21   < > 21 33 H 33 H 36 H 39 H   Creatinine 1.1 0.9 0.8 1.5 H 1.3 1.2 1.5 H   < > 1.4 1.9 H 1.8 H 2.1 H 2.1 H   eGFR if non African American >60.0 >60.0 >60.0 48 A 57.3 A >60.0 47.5 A  --   --   --   --   --   --    eGFR  --   --   --   --   --   --   --    < > 54.7 A 38 A 40 A 34 A 34 A   Calcium 8.7 8.8 9.0 9.1 9.0 9.4 9.5   < > 9.2 9.8 9.6 9.7 9.4   Magnesium 1.8 1.7 1.7  --   --   --   --   --   --  2.0  --   --   --     < > = values in this interval not displayed.         CARDIAC BIOMARKERS:  Recent Labs   Lab 11/10/23  2146 11/11/23  0344   Troponin I 0.014 0.028 H         COAGS:  Recent Labs   Lab 03/31/21  1808 04/02/21  1157 11/11/23  0344   INR 1.0 1.0 1.7 H         LIPIDS/LFTS:  Recent Labs   Lab  11/18/20  1058 03/31/21  1808 07/18/22  1251 10/14/22  1020 08/25/23  0940 09/14/23  1625 11/10/23  2146 11/11/23  0344 11/12/23  0408 11/13/23  0539   Cholesterol 143  --  148  --   --  103 L  --   --   --   --    Triglycerides 82  --  90  --   --  51  --   --   --   --    HDL 49  --  51  --   --  35 L  --   --   --   --    LDL Cholesterol 77.6  --  79.0  --   --  57.8 L  --   --   --   --    Non-HDL Cholesterol 94  --  97  --   --  68  --   --   --   --    AST 15   < > 15   < > 19  --  46 H 53 H 74 H 104 H   ALT 9 L   < > 9 L   < > 16  --  46 H 47 H 61 H 84 H    < > = values in this interval not displayed.         BNP:  Recent Labs   Lab 11/18/20  1058 08/25/23  0940 11/10/23  2146    H 525 H 1,592 H         TSH:  Recent Labs   Lab 05/12/21  0929 07/18/22  1251 09/14/23  1625   TSH 3.421 1.729 2.764         Free T4:            Diagnostic Results:  ECG (personally reviewed and interpreted tracing(s)):  11/10/23 1746 AS-BiV paced 66    Chest X-Ray (personally reviewed and interpreted image(s)): 11/10/23 CHF, CMeg, R PPM 3 leads    Echo: 8/18/23 (repeat pending)    Left Ventricle: The left ventricle is severely dilated. Increased ventricular mass. Normal wall thickness. Global hypokinesis present. There is severely reduced systolic function with a visually estimated ejection fraction of 15 - 20%. There is diastolic dysfunction.    Left Atrium: Left atrium is severely dilated.    Right Ventricle: Severe right ventricular enlargement. Wall thickness is normal. Systolic function is moderately reduced. Pacemaker lead present in the ventricle.    Right Atrium: Right atrium is dilated.    Mitral Valve: Mild mitral annular calcification. There is moderate regurgitation.    Tricuspid Valve: There is mild regurgitation.    Pulmonic Valve: There is mild regurgitation.    Pulmonary Artery: The estimated pulmonary artery systolic pressure is 59 mmHg.    IVC/SVC: Elevated venous pressure at 15 mmHg.    Cath: 2/1/18     Normal coronary arteries.     Systolic dysfunction.     Low right and left Filling Pressures.     Mild Pulmonary Hypertension.

## 2023-11-13 NOTE — ASSESSMENT & PLAN NOTE
Patient was found to have thrombocytopenia  No active bleeding  With elevated TBili and elevated INR, concern for congestive hepatopathy. Liver US shows hepatomegaly, no cirrhosis   The patient's platelet results have been reviewed and are listed below.  Recent Labs   Lab 11/13/23  0539   *     - monitor CBC

## 2023-11-13 NOTE — ASSESSMENT & PLAN NOTE
Palliative care consult ordered.    20 minute(s) ACP time spent: Goals of care, emotional support, formulating and communicating prognosis, exploring burden/benefits of various approaches of treatment.

## 2023-11-13 NOTE — ASSESSMENT & PLAN NOTE
Per chart review history of CKD. Cr 1.9 on admit from baseline of 1.4 to 1.5.     Creatine worsened from baseline. BMP reviewed- noted Estimated Creatinine Clearance: 49.3 mL/min (A) (based on SCr of 2.1 mg/dL (H)). according to latest data. Based on current GFR, CKD stage is stage 3 - GFR 30-59.  Monitor UOP and serial BMP and adjust therapy as needed. Renally dose meds. Avoid nephrotoxic medications and procedures.  - this is cardiorenal syndrome  - increased lasix to 80mg IV TID on 11/12 and added metolazone 5mg daily on 11/13   - holding ARB and spironolactone  - CMP again in AM

## 2023-11-13 NOTE — ASSESSMENT & PLAN NOTE
Chronic, controlled. Latest blood pressure and vitals reviewed-     Temp:  [97.3 °F (36.3 °C)-97.9 °F (36.6 °C)]   Pulse:  [60-72]   Resp:  [18-23]   BP: ()/(54-79)   SpO2:  [96 %-100 %] .   Home meds for hypertension were reviewed and noted below.   Hypertension Medications               furosemide (LASIX) 80 MG tablet TAKE 1 TABLET EVERY DAY    losartan (COZAAR) 25 MG tablet Take 25 mg by mouth once daily.    metoprolol succinate (TOPROL-XL) 50 MG 24 hr tablet TAKE 1 TABLET EVERY DAY    spironolactone (ALDACTONE) 25 MG tablet TAKE 1/2 TABLET (12.5 MG TOTAL) ONCE DAILY. HOLD IF SYSTOLIC BLOOD PRESSURE IS LESS THAN 110          - holding metoprolol with concerns for normotensive cardiogenic shock  - hold losartan and spironolactone in setting of RIGOBERTO on CKD  - changed PO lasix to IV for CHF exacerbation    Will utilize p.r.n. blood pressure medication only if patient's blood pressure greater than 180/110 and he develops symptoms such as worsening chest pain or shortness of breath.

## 2023-11-13 NOTE — ASSESSMENT & PLAN NOTE
Presents with SOB and lower extremity edema, BNP 1200, chest x-ray with pulmonary vascular congestion. He reports he began adding salt to his food due to concern that he would lose all of his salt due to diuretic    Patient is identified as having Combined Systolic and Diastolic heart failure that is Acute on chronic. CHF is currently uncontrolled due to Continued edema of extremities, Dyspnea not returned to baseline after 1 doses of IV diuretic and Pulmonary edema/pleural effusion on CXR. Latest ECHO performed and demonstrates- Results for orders placed during the hospital encounter of 08/18/23    Echo    Interpretation Summary    Left Ventricle: The left ventricle is severely dilated. Increased ventricular mass. Normal wall thickness. Global hypokinesis present. There is severely reduced systolic function with a visually estimated ejection fraction of 15 - 20%. There is diastolic dysfunction.    Left Atrium: Left atrium is severely dilated.    Right Ventricle: Severe right ventricular enlargement. Wall thickness is normal. Systolic function is moderately reduced. Pacemaker lead present in the ventricle.    Right Atrium: Right atrium is dilated.    Mitral Valve: Mild mitral annular calcification. There is moderate regurgitation.    Tricuspid Valve: There is mild regurgitation.    Pulmonic Valve: There is mild regurgitation.    Pulmonary Artery: The estimated pulmonary artery systolic pressure is 59 mmHg.    IVC/SVC: Elevated venous pressure at 15 mmHg.  Monitor on telemetry. Patient is on CHF pathway.  Monitor strict Is&Os and daily weights.  Place on fluid restriction of 1.5 L. Cardiology has been consulted. Continue to stress to patient importance of self efficacy and  on diet for CHF. Last BNP reviewed- and noted below   Recent Labs   Lab 11/10/23  2146   BNP 1,592*       - with RIGOBERTO on CKD3a- cardiorenal syndrome  - with elevated TBili- due to hepatic congestion  - with lactic 3.5-- normotensive  cardiogenic shock  - hold BB, spironolactone. entresto was too expensive and did not tolerate ACEi/ARB. Consider bidil when improves  - increased lasix to 80mg IV TID on 11/12 and added metolazone 5mg daily on 11/13   - Cardiology following- next step would be dobutamine gtt in ICU  - Palliative consulted

## 2023-11-13 NOTE — ASSESSMENT & PLAN NOTE
"- Consult for introduction to palliative medicine and advance care planning in pt with severe cardiomyopathy (EF 15% on last echo, updated echo pending) currently in hospital with acute on chronic heart failure, and RIGOBERTO on CKD (likely cardiorenal syndrome). Chart reviewed; discussed pt in depth with primary staff, Dr Kennedy.   - Visited with pt at bedside; during entirety of visit, he was alert, polite, and readily conversational. Introduced role of palliative medicine, and learned more about pt outside of hospital. He has been  for several years, and has two children. He lives with his daughter Annie (preferred [facility] in the event he is unable to make his own medical decisions, son Papito Draper is alternate), as well as his 8 yo grandson. He has several grandchildren (some grown), and actually just recently became a great-grandfather!   - He was not able to serve in the  when younger due to being an only child, though had an interest in traveling and chose a career as a  to help facilitate this. 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   - Extensively discussed his underlying illnesses, and provided him with illness trajectory education. In discussing his overall care preferences, he said he recently talked about this with his daughter, and he told her "do whatever it takes" to keep him alive. His care preferences are consistent with maximal medical therapy, including maintaining full code status. Can further discuss this as his clinical course evolves, as he would likely have poor outcome with CPR and/or prolonged intubation.   - Updated primary team and cardiology after visit; our team will follow up with pt and family - will likely reach out to his " daughter Annie tomorrow.
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
## 2023-11-13 NOTE — NURSING
[facility], [facility]  Nurses Note -- 4 Eyes      11/13/2023       Skin assessed on: Q Shift      [x] No Pressure Injuries Present    []Prevention Measures Documented    [] Yes LDA  for Pressure Injury Previously documented     [] Yes New Pressure Injury Discovered   [] LDA for New Pressure Injury Added      Attending RN:  Stacey Olsen LPN     Second RN:  LAUREN Lozano

## 2023-11-14 LAB
ALBUMIN SERPL BCP-MCNC: 3.1 G/DL (ref 3.5–5.2)
ALP SERPL-CCNC: 95 U/L (ref 55–135)
ALT SERPL W/O P-5'-P-CCNC: 85 U/L (ref 10–44)
ANION GAP SERPL CALC-SCNC: 15 MMOL/L (ref 8–16)
ANION GAP SERPL CALC-SCNC: 15 MMOL/L (ref 8–16)
AST SERPL-CCNC: 92 U/L (ref 10–40)
BASOPHILS # BLD AUTO: 0.03 K/UL (ref 0–0.2)
BASOPHILS NFR BLD: 0.5 % (ref 0–1.9)
BILIRUB SERPL-MCNC: 5.1 MG/DL (ref 0.1–1)
BUN SERPL-MCNC: 34 MG/DL (ref 8–23)
BUN SERPL-MCNC: 36 MG/DL (ref 8–23)
CALCIUM SERPL-MCNC: 9.5 MG/DL (ref 8.7–10.5)
CALCIUM SERPL-MCNC: 9.9 MG/DL (ref 8.7–10.5)
CHLORIDE SERPL-SCNC: 95 MMOL/L (ref 95–110)
CHLORIDE SERPL-SCNC: 98 MMOL/L (ref 95–110)
CO2 SERPL-SCNC: 26 MMOL/L (ref 23–29)
CO2 SERPL-SCNC: 28 MMOL/L (ref 23–29)
CREAT SERPL-MCNC: 1.7 MG/DL (ref 0.5–1.4)
CREAT SERPL-MCNC: 1.8 MG/DL (ref 0.5–1.4)
DIFFERENTIAL METHOD: ABNORMAL
EOSINOPHIL # BLD AUTO: 0.1 K/UL (ref 0–0.5)
EOSINOPHIL NFR BLD: 0.9 % (ref 0–8)
ERYTHROCYTE [DISTWIDTH] IN BLOOD BY AUTOMATED COUNT: 18.9 % (ref 11.5–14.5)
EST. GFR  (NO RACE VARIABLE): 40 ML/MIN/1.73 M^2
EST. GFR  (NO RACE VARIABLE): 43 ML/MIN/1.73 M^2
GLUCOSE SERPL-MCNC: 100 MG/DL (ref 70–110)
GLUCOSE SERPL-MCNC: 101 MG/DL (ref 70–110)
HCT VFR BLD AUTO: 39.2 % (ref 40–54)
HGB BLD-MCNC: 12.1 G/DL (ref 14–18)
IMM GRANULOCYTES # BLD AUTO: 0.02 K/UL (ref 0–0.04)
IMM GRANULOCYTES NFR BLD AUTO: 0.3 % (ref 0–0.5)
LYMPHOCYTES # BLD AUTO: 0.9 K/UL (ref 1–4.8)
LYMPHOCYTES NFR BLD: 15.2 % (ref 18–48)
MAGNESIUM SERPL-MCNC: 1.8 MG/DL (ref 1.6–2.6)
MCH RBC QN AUTO: 23 PG (ref 27–31)
MCHC RBC AUTO-ENTMCNC: 30.9 G/DL (ref 32–36)
MCV RBC AUTO: 74 FL (ref 82–98)
MONOCYTES # BLD AUTO: 0.9 K/UL (ref 0.3–1)
MONOCYTES NFR BLD: 15.7 % (ref 4–15)
NEUTROPHILS # BLD AUTO: 3.9 K/UL (ref 1.8–7.7)
NEUTROPHILS NFR BLD: 67.4 % (ref 38–73)
NRBC BLD-RTO: 0 /100 WBC
PLATELET # BLD AUTO: 132 K/UL (ref 150–450)
PMV BLD AUTO: ABNORMAL FL (ref 9.2–12.9)
POTASSIUM SERPL-SCNC: 3.6 MMOL/L (ref 3.5–5.1)
POTASSIUM SERPL-SCNC: 3.6 MMOL/L (ref 3.5–5.1)
PROT SERPL-MCNC: 6.9 G/DL (ref 6–8.4)
RBC # BLD AUTO: 5.27 M/UL (ref 4.6–6.2)
SODIUM SERPL-SCNC: 138 MMOL/L (ref 136–145)
SODIUM SERPL-SCNC: 139 MMOL/L (ref 136–145)
WBC # BLD AUTO: 5.73 K/UL (ref 3.9–12.7)

## 2023-11-14 PROCEDURE — 85025 COMPLETE CBC W/AUTO DIFF WBC: CPT | Performed by: HOSPITALIST

## 2023-11-14 PROCEDURE — 63600175 PHARM REV CODE 636 W HCPCS: Performed by: INTERNAL MEDICINE

## 2023-11-14 PROCEDURE — 80053 COMPREHEN METABOLIC PANEL: CPT | Performed by: HOSPITALIST

## 2023-11-14 PROCEDURE — 63600175 PHARM REV CODE 636 W HCPCS: Performed by: HOSPITALIST

## 2023-11-14 PROCEDURE — 11000001 HC ACUTE MED/SURG PRIVATE ROOM

## 2023-11-14 PROCEDURE — 97161 PT EVAL LOW COMPLEX 20 MIN: CPT

## 2023-11-14 PROCEDURE — 99233 SBSQ HOSP IP/OBS HIGH 50: CPT | Mod: ,,, | Performed by: INTERNAL MEDICINE

## 2023-11-14 PROCEDURE — 99900035 HC TECH TIME PER 15 MIN (STAT)

## 2023-11-14 PROCEDURE — 83735 ASSAY OF MAGNESIUM: CPT | Performed by: FAMILY MEDICINE

## 2023-11-14 PROCEDURE — 97165 OT EVAL LOW COMPLEX 30 MIN: CPT

## 2023-11-14 PROCEDURE — 97530 THERAPEUTIC ACTIVITIES: CPT

## 2023-11-14 PROCEDURE — 94761 N-INVAS EAR/PLS OXIMETRY MLT: CPT

## 2023-11-14 PROCEDURE — 80048 BASIC METABOLIC PNL TOTAL CA: CPT | Mod: XB | Performed by: FAMILY MEDICINE

## 2023-11-14 PROCEDURE — 25000003 PHARM REV CODE 250: Performed by: HOSPITALIST

## 2023-11-14 PROCEDURE — 25000003 PHARM REV CODE 250: Performed by: FAMILY MEDICINE

## 2023-11-14 PROCEDURE — 25000003 PHARM REV CODE 250: Performed by: INTERNAL MEDICINE

## 2023-11-14 PROCEDURE — 99233 PR SUBSEQUENT HOSPITAL CARE,LEVL III: ICD-10-PCS | Mod: ,,, | Performed by: INTERNAL MEDICINE

## 2023-11-14 PROCEDURE — C9113 INJ PANTOPRAZOLE SODIUM, VIA: HCPCS | Performed by: HOSPITALIST

## 2023-11-14 PROCEDURE — 36415 COLL VENOUS BLD VENIPUNCTURE: CPT | Performed by: FAMILY MEDICINE

## 2023-11-14 PROCEDURE — 25000003 PHARM REV CODE 250: Performed by: PHYSICIAN ASSISTANT

## 2023-11-14 PROCEDURE — 36415 COLL VENOUS BLD VENIPUNCTURE: CPT | Performed by: HOSPITALIST

## 2023-11-14 RX ORDER — FUROSEMIDE 10 MG/ML
80 INJECTION INTRAMUSCULAR; INTRAVENOUS
Status: DISCONTINUED | OUTPATIENT
Start: 2023-11-14 | End: 2023-11-15

## 2023-11-14 RX ORDER — DICLOFENAC SODIUM 10 MG/G
2 GEL TOPICAL 2 TIMES DAILY
Status: DISCONTINUED | OUTPATIENT
Start: 2023-11-14 | End: 2023-11-15 | Stop reason: HOSPADM

## 2023-11-14 RX ADMIN — HEPARIN SODIUM 5000 UNITS: 5000 INJECTION INTRAVENOUS; SUBCUTANEOUS at 09:11

## 2023-11-14 RX ADMIN — DICLOFENAC SODIUM 2 G: 10 GEL TOPICAL at 09:11

## 2023-11-14 RX ADMIN — FUROSEMIDE 80 MG: 10 INJECTION, SOLUTION INTRAVENOUS at 09:11

## 2023-11-14 RX ADMIN — ACETAMINOPHEN 650 MG: 325 TABLET ORAL at 06:11

## 2023-11-14 RX ADMIN — PANTOPRAZOLE SODIUM 40 MG: 40 INJECTION, POWDER, FOR SOLUTION INTRAVENOUS at 08:11

## 2023-11-14 RX ADMIN — PRAVASTATIN SODIUM 80 MG: 40 TABLET ORAL at 08:11

## 2023-11-14 RX ADMIN — FUROSEMIDE 80 MG: 10 INJECTION, SOLUTION INTRAVENOUS at 08:11

## 2023-11-14 RX ADMIN — AMIODARONE HYDROCHLORIDE 200 MG: 200 TABLET ORAL at 08:11

## 2023-11-14 RX ADMIN — ASPIRIN 81 MG CHEWABLE TABLET 81 MG: 81 TABLET CHEWABLE at 08:11

## 2023-11-14 RX ADMIN — PANTOPRAZOLE SODIUM 40 MG: 40 INJECTION, POWDER, FOR SOLUTION INTRAVENOUS at 09:11

## 2023-11-14 RX ADMIN — HEPARIN SODIUM 5000 UNITS: 5000 INJECTION INTRAVENOUS; SUBCUTANEOUS at 02:11

## 2023-11-14 RX ADMIN — HEPARIN SODIUM 5000 UNITS: 5000 INJECTION INTRAVENOUS; SUBCUTANEOUS at 06:11

## 2023-11-14 RX ADMIN — METOLAZONE 5 MG: 5 TABLET ORAL at 08:11

## 2023-11-14 NOTE — PLAN OF CARE
Problem: Adult Inpatient Plan of Care  Goal: Plan of Care Review  Outcome: Ongoing, Progressing  Flowsheets (Taken 11/14/2023 1616)  Plan of Care Reviewed With: patient  Goal: Absence of Hospital-Acquired Illness or Injury  Intervention: Identify and Manage Fall Risk  Flowsheets (Taken 11/14/2023 1616)  Safety Promotion/Fall Prevention:   assistive device/personal item within reach   Fall Risk reviewed with patient/family   high risk medications identified   nonskid shoes/socks when out of bed   side rails raised x 2   instructed to call staff for mobility   room near unit station   medications reviewed   bed alarm set  Intervention: Prevent Skin Injury  Flowsheets (Taken 11/14/2023 1616)  Body Position:   position changed independently   weight shifting  Skin Protection:   adhesive use limited   tubing/devices free from skin contact  Intervention: Prevent and Manage VTE (Venous Thromboembolism) Risk  Flowsheets (Taken 11/14/2023 1616)  Activity Management:   Ankle pumps - L1   Arm raise - L1   Rolling - L1   Straight leg raise - L1  VTE Prevention/Management:   ambulation promoted   bleeding precations maintained   bleeding risk assessed  Range of Motion: active ROM (range of motion) encouraged  Intervention: Prevent Infection  Flowsheets (Taken 11/14/2023 1616)  Infection Prevention: hand hygiene promoted  Goal: Optimal Comfort and Wellbeing  Intervention: Monitor Pain and Promote Comfort  Flowsheets (Taken 11/14/2023 1616)  Pain Management Interventions: pain management plan reviewed with patient/caregiver  Intervention: Provide Person-Centered Care  Flowsheets (Taken 11/14/2023 1616)  Trust Relationship/Rapport:   care explained   questions encouraged   choices provided   empathic listening provided   emotional support provided   questions answered   thoughts/feelings acknowledged   reassurance provided     Problem: Coping Ineffective  Goal: Effective Coping  Intervention: Support and Enhance Coping  Strategies  Flowsheets (Taken 11/14/2023 1616)  Supportive Measures:   guided imagery facilitated   positive reinforcement provided   problem-solving facilitated   self-responsibility promoted   self-reflection promoted   self-care encouraged   relaxation techniques promoted   verbalization of feelings encouraged  Family/Support System Care: self-care encouraged  Environmental Support: calm environment promoted     Problem: Fall Injury Risk  Goal: Absence of Fall and Fall-Related Injury  Intervention: Identify and Manage Contributors  Flowsheets (Taken 11/14/2023 1616)  Self-Care Promotion:   independence encouraged   meal set-up provided   BADL personal objects within reach   BADL personal routines maintained   safe use of adaptive equipment encouraged  Medication Review/Management:   medications reviewed   high-risk medications identified  Intervention: Promote Injury-Free Environment  Flowsheets (Taken 11/14/2023 1616)  Safety Promotion/Fall Prevention:   assistive device/personal item within reach   Fall Risk reviewed with patient/family   high risk medications identified   nonskid shoes/socks when out of bed   side rails raised x 2   instructed to call staff for mobility   room near unit station   medications reviewed   bed alarm set     Problem: Nausea and Vomiting  Goal: Fluid and Electrolyte Balance  Intervention: Prevent and Manage Nausea and Vomiting  Flowsheets (Taken 11/14/2023 1616)  Environmental Support: calm environment promoted   Pt alert able to make needs known,luis meds well,IV Lasix remains in progress,no s/s adverse reaction noted,reposition self q 2hrs,pain controlled by prn pain medication,POC explained,remains free from falls and pressure injuries,safety maintained,continue monitoring.

## 2023-11-14 NOTE — PLAN OF CARE
Problem: Adult Inpatient Plan of Care  Goal: Plan of Care Review  Outcome: Ongoing, Progressing  Goal: Patient-Specific Goal (Individualized)  Outcome: Ongoing, Progressing  Goal: Optimal Comfort and Wellbeing  Outcome: Ongoing, Progressing  Goal: Readiness for Transition of Care  Outcome: Ongoing, Progressing     Problem: Fluid and Electrolyte Imbalance (Acute Kidney Injury/Impairment)  Goal: Fluid and Electrolyte Balance  Outcome: Ongoing, Progressing     Problem: Coping Ineffective  Goal: Effective Coping  Outcome: Ongoing, Progressing     Problem: Gas Exchange Impaired  Goal: Optimal Gas Exchange  Outcome: Ongoing, Progressing

## 2023-11-14 NOTE — NURSING
[facility] [place]  Nurses Note -- 4 Eyes    11-13-23      Skin assessed on: Q Shift      [x] No Pressure Injuries Present    []Prevention Measures Documented    [] Yes LDA  for Pressure Injury Previously documented     [] Yes New Pressure Injury Discovered   [] LDA for New Pressure Injury Added      Attending RN:  Dotty Pantoja, RN     Second RN:  Stacey Olsen LPN

## 2023-11-14 NOTE — SUBJECTIVE & OBJECTIVE
Past Medical History:   Diagnosis Date    RIGOBERTO (acute kidney injury) 10/7/2020    Anticoagulant long-term use     Aspirin    CHF (congestive heart failure)     Hyperlipidemia     Hypertension     NICM (nonischemic cardiomyopathy) 6/16/2020    Obesity     AMELIA (obstructive sleep apnea) 1/7/2022    Peripheral vascular disease, unspecified 2/1/2021       Past Surgical History:   Procedure Laterality Date    INSERTION OF BIVENTRICULAR IMPLANTABLE CARDIOVERTER-DEFIBRILLATOR (ICD) N/A 02/13/2019    Procedure: INSERTION, ICD, BIVENTRICULAR;  Surgeon: Shailesh Eng MD;  Location: [facility] CATH LAB;  Service: Cardiology;  Laterality: N/A;  RN PRE OP 2-6-19  1ST CASE PER  RADHA. NOTIFIED RADHA THAT ANESTHESIA IS NOT PITO FOR 1ST CASE START-LO    INSERTION OF BIVENTRICULAR IMPLANTABLE CARDIOVERTER-DEFIBRILLATOR (ICD) N/A 09/28/2020    Procedure: INSERTION, ICD, BIVENTRICULAR;  Surgeon: Jim Kwong MD;  Location: [facility] EP LAB;  Service: Cardiology;  Laterality: N/A;  NICM, CRT-D, SJM,, MAC, DM, 3 Prep*Wearing LifeVest*    oral extraction  11/2018    TESTICLE SURGERY         Review of patient's allergies indicates:   Allergen Reactions    Ramipril      Leg swelling and gynecomastia     Losartan Rash       No current facility-administered medications on file prior to encounter.     Current Outpatient Medications on File Prior to Encounter   Medication Sig    acetaminophen (TYLENOL) 500 MG tablet Take 2 tablets (1,000 mg total) by mouth every 8 (eight) hours as needed for Pain or Temperature greater than (100.5).    amiodarone (PACERONE) 200 MG Tab Take 1 tablet (200 mg total) by mouth once daily.    aspirin (ECOTRIN) 325 MG EC tablet Take 1 tablet (325 mg total) by mouth once daily.    empagliflozin (JARDIANCE) 10 mg tablet Take 1 tablet (10 mg total) by mouth once daily.    furosemide (LASIX) 80 MG tablet TAKE 1 TABLET EVERY DAY    gabapentin (NEURONTIN) 100 MG capsule Take 1 capsule (100 mg total) by mouth 3 (three) times  daily. Take 100 mg by mouth 3 (three) times daily.    hydrocortisone 1 % cream Apply topically 2 (two) times daily.    losartan (COZAAR) 25 MG tablet Take 25 mg by mouth once daily.    meloxicam (MOBIC) 15 MG tablet Take 1 tablet (15 mg total) by mouth daily as needed for Pain.    methocarbamoL (ROBAXIN) 500 MG Tab Take 2 tablets (1,000 mg total) by mouth 3 (three) times daily as needed (Pain not improved by other medications).    metoprolol succinate (TOPROL-XL) 50 MG 24 hr tablet TAKE 1 TABLET EVERY DAY    potassium chloride (K-TAB) 20 mEq TAKE 1 TABLET EVERY DAY    pravastatin (PRAVACHOL) 80 MG tablet TAKE 1 TABLET EVERY DAY    spironolactone (ALDACTONE) 25 MG tablet TAKE 1/2 TABLET (12.5 MG TOTAL) ONCE DAILY. HOLD IF SYSTOLIC BLOOD PRESSURE IS LESS THAN 110    [DISCONTINUED] ramipril (ALTACE) 10 MG capsule Take 1 capsule (10 mg total) by mouth once daily.     Family History       Problem Relation (Age of Onset)    Epilepsy Mother          Tobacco Use    Smoking status: Former     Current packs/day: 0.00     Average packs/day: 0.5 packs/day for 40.0 years (20.0 ttl pk-yrs)     Types: Cigarettes, Cigars     Start date:      Quit date:      Years since quittin.8     Passive exposure: Current    Smokeless tobacco: Never   Substance and Sexual Activity    Alcohol use: Yes     Comment: once a month beer or liquor    Drug use: No    Sexual activity: Yes     Birth control/protection: None     Comment: uses protection sometimes     Review of Systems   Gastrointestinal:  Negative for melena.   Genitourinary:  Negative for hematuria.     Objective:     Vital Signs (Most Recent):  Temp: 97.4 °F (36.3 °C) (23)  Pulse: 61 (23)  Resp: 17 (23)  BP: 101/64 (23)  SpO2: 99 % (23) Vital Signs (24h Range):  Temp:  [97.4 °F (36.3 °C)-97.9 °F (36.6 °C)] 97.4 °F (36.3 °C)  Pulse:  [60-71] 61  Resp:  [17-20] 17  SpO2:  [97 %-100 %] 99 %  BP: (101-117)/(64-78) 101/64      Weight: 125 kg (275 lb 9.2 oz)  Body mass index is 32.68 kg/m².    SpO2: 99 %         Intake/Output Summary (Last 24 hours) at 11/14/2023 0803  Last data filed at 11/14/2023 0635  Gross per 24 hour   Intake 600 ml   Output 3450 ml   Net -2850 ml         Lines/Drains/Airways       None                    Physical Exam  Constitutional:       General: He is not in acute distress.     Appearance: He is well-developed. He is obese. He is not ill-appearing, toxic-appearing or diaphoretic.   HENT:      Head: Normocephalic and atraumatic.   Eyes:      General: No scleral icterus.     Extraocular Movements: Extraocular movements intact.      Conjunctiva/sclera: Conjunctivae normal.      Pupils: Pupils are equal, round, and reactive to light.   Neck:      Thyroid: No thyromegaly.      Vascular: JVD present.      Trachea: No tracheal deviation.   Cardiovascular:      Rate and Rhythm: Normal rate and regular rhythm.      Heart sounds: S1 normal and S2 normal. Heart sounds are distant. No murmur heard.     No friction rub. No gallop.   Pulmonary:      Effort: Pulmonary effort is normal. No respiratory distress.      Breath sounds: Normal breath sounds. No stridor. No wheezing, rhonchi or rales.   Chest:      Chest wall: No tenderness.   Abdominal:      General: There is no distension.      Palpations: Abdomen is soft.   Musculoskeletal:         General: No tenderness. Normal range of motion.      Cervical back: Normal range of motion and neck supple. No rigidity.      Right lower leg: Edema present.      Left lower leg: Edema present.   Skin:     General: Skin is warm and dry.      Coloration: Skin is not jaundiced.   Neurological:      General: No focal deficit present.      Mental Status: He is alert and oriented to person, place, and time.      Cranial Nerves: No cranial nerve deficit.   Psychiatric:         Mood and Affect: Mood normal.         Behavior: Behavior normal.          Current Medications:   amiodarone  200 mg  Oral Daily    aspirin  81 mg Oral Daily    furosemide (LASIX) injection  80 mg Intravenous Q8H    heparin (porcine)  5,000 Units Subcutaneous Q8H    metOLazone  5 mg Oral Daily    pantoprazole  40 mg Intravenous BID    pravastatin  80 mg Oral Daily       acetaminophen, melatonin, ondansetron, prochlorperazine, senna-docusate 8.6-50 mg, sodium chloride 0.9%    Laboratory (all labs reviewed):  CBC:  Recent Labs   Lab 05/12/23  1622 11/10/23  2146 11/12/23  0408 11/13/23  0539 11/14/23  0532   WBC 4.68 5.15 5.17 5.88 5.73   Hemoglobin 12.8 L 12.9 L 12.3 L 11.8 L 12.1 L   Hematocrit 39.8 L 41.6 41.5 37.5 L 39.2 L   Platelets 145 L 116 L 130 L 128 L 132 L         CHEMISTRIES:  Recent Labs   Lab 11/19/20  0300 11/20/20  0243 11/21/20  0622 03/11/21  1009 03/31/21  1808 09/15/21  0940 07/18/22  1251 08/05/22  1119 11/10/23  2146 11/11/23  0344 11/12/23  0408 11/13/23  0539 11/14/23  0532   Glucose 89 84 90 88 90 90 94   < > 90 82 77 97 100   Sodium 142 142 142 140 141 137 138   < > 139 138 137 136 139   Potassium 3.1 L 3.2 L 3.5 4.7 4.3 4.5 4.6   < > 4.3 4.1 4.5 4.2 3.6   BUN 16 14 13 27 H 16 15 21   < > 33 H 33 H 36 H 39 H 36 H   Creatinine 1.1 0.9 0.8 1.5 H 1.3 1.2 1.5 H   < > 1.9 H 1.8 H 2.1 H 2.1 H 1.8 H   eGFR if non African American >60.0 >60.0 >60.0 48 A 57.3 A >60.0 47.5 A  --   --   --   --   --   --    eGFR  --   --   --   --   --   --   --    < > 38 A 40 A 34 A 34 A 40 A   Calcium 8.7 8.8 9.0 9.1 9.0 9.4 9.5   < > 9.8 9.6 9.7 9.4 9.5   Magnesium 1.8 1.7 1.7  --   --   --   --   --  2.0  --   --   --   --     < > = values in this interval not displayed.         CARDIAC BIOMARKERS:  Recent Labs   Lab 11/10/23  2146 11/11/23  0344   Troponin I 0.014 0.028 H         COAGS:  Recent Labs   Lab 03/31/21  1808 04/02/21  1157 11/11/23  0344   INR 1.0 1.0 1.7 H         LIPIDS/LFTS:  Recent Labs   Lab 11/18/20  1058 03/31/21  1808 07/18/22  1251 10/14/22  1020 09/14/23  1625 11/10/23  2146 11/11/23  0344 11/12/23  0408  11/13/23  0539 11/14/23  0532   Cholesterol 143  --  148  --  103 L  --   --   --   --   --    Triglycerides 82  --  90  --  51  --   --   --   --   --    HDL 49  --  51  --  35 L  --   --   --   --   --    LDL Cholesterol 77.6  --  79.0  --  57.8 L  --   --   --   --   --    Non-HDL Cholesterol 94  --  97  --  68  --   --   --   --   --    AST 15   < > 15   < >  --  46 H 53 H 74 H 104 H 92 H   ALT 9 L   < > 9 L   < >  --  46 H 47 H 61 H 84 H 85 H    < > = values in this interval not displayed.         BNP:  Recent Labs   Lab 11/18/20  1058 08/25/23  0940 11/10/23  2146    H 525 H 1,592 H         TSH:  Recent Labs   Lab 05/12/21  0929 07/18/22  1251 09/14/23  1625   TSH 3.421 1.729 2.764         Free T4:            Diagnostic Results:  ECG (personally reviewed and interpreted tracing(s)):  11/10/23 1746 AS-BiV paced 66    Chest X-Ray (personally reviewed and interpreted image(s)): 11/10/23 CHF, CMeg, R PPM 3 leads    Echo: 11/12/23 (images prev personally reviewed and interpreted)  stable findings vs report 8/18/23    Left Ventricle: The left ventricle is severely dilated. Mildly increased wall thickness. There is mild concentric hypertrophy. Severe global hypokinesis present. There is severely reduced systolic function with a visually estimated ejection fraction of 10 -15%. Grade III diastolic dysfunction.    Right Ventricle: Severe right ventricular enlargement. Systolic function is severely reduced.    Mitral Valve: There is no stenosis. There is mild to moderate regurgitation with a centrally directed jet.    Tricuspid Valve: There is mild to moderate regurgitation.    Pulmonary Artery: The estimated pulmonary artery systolic pressure is 67 mmHg.    Cath: 2/1/18    Normal coronary arteries.     Systolic dysfunction.     Low right and left Filling Pressures.     Mild Pulmonary Hypertension.

## 2023-11-14 NOTE — PLAN OF CARE
Problem: Physical Therapy  Goal: Physical Therapy Goal  Description: Goals to be met by: 23     Patient will increase functional independence with mobility by performin. Supine to sit with Modified [place]  2. Sit to stand transfer with Modified [place]  3. Gait  x 75 feet with Modified [place] using RW vs Axillary crutches.   4. Lower extremity exercise program x10 reps per handout, with supervision    Outcome: Ongoing, Progressing   Initial PT evaluation performed today.  Pt could benefit from skilled PT services 3-5x/k in order to maximize function prior to D/C.  Low Intensity therapy recommended at time of D/C.

## 2023-11-14 NOTE — PROGRESS NOTES
[facility]  Palliative Medicine  Progress Note    Patient Name: Papito Bhakta  MRN: [id number]  Admission Date: 11/10/2023  Hospital Length of Stay: 2 days  Code Status: Full Code   Attending Provider: Serenity Baker MD  Consulting Provider: Larissa Isabel MD  Primary Care Physician: Brynn To MD  Principal Problem:Acute on chronic combined systolic and diastolic CHF (congestive heart failure)    Assessment/Plan:     Advance Care Planning    11/14/23  - Chart and interval history reviewed; pt improving with diuresis.   - Visited with pt at bedside; he was alert, sitting up in chair at bedside, and said his leg swelling is improving, though he is having some leg cramps - offered to get him a heat pack. As PCT was in room, only stayed for a brief visit.   - With his permission, called and spoke with his very supportive daughter, Annie; introduced role of palliative medicine. As she has worked as a nursing assistance, she does have medical knowledge.   - Thorough medical update provided, as well as illness trajectory education. I let her know that her father has voiced a desire to continue maximal medical therapy, in hopes of having as much time as possible. On hearing this, she said she will always support her father's decisions; told her that is wonderful to hear that, and it sounds like she is doing a great job caring for him at home.   - She is open to home health and home-based palliative care for pt, though said final decision will be up to her father  - Let her know our team will continue to check in on pt during this and any father hospital stays    11/13/23  - Consult for introduction to palliative medicine and advance care planning in pt with severe cardiomyopathy (EF 15% on last echo, updated echo pending) currently in hospital with acute on chronic heart failure, and RIGOBERTO on CKD (likely cardiorenal syndrome). Chart reviewed; discussed pt in depth with primary staff, Dr Kennedy.   -  "Visited with pt at bedside; during entirety of visit, he was alert, polite, and readily conversational. Introduced role of palliative medicine, and learned more about pt outside of hospital. He has been  for several years, and has two children. He lives with his daughter Annie (preferred [facility] in the event he is unable to make his own medical decisions, son Papito Draper is alternate), as well as his 10 yo grandson. He has several grandchildren (some grown), and actually just recently became a great-grandfather!   - He was not able to serve in the  when younger due to being an only child, though had an interest in traveling and chose a career as a  to help facilitate this. 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   - Extensively discussed his underlying illnesses, and provided him with illness trajectory education. In discussing his overall care preferences, he said he recently talked about this with his daughter, and he told her "do whatever it takes" to keep him alive. His care preferences are consistent with maximal medical therapy, including maintaining full code status. Can further discuss this as his clinical course evolves, as he would likely have poor outcome with CPR and/or prolonged intubation.   - Updated primary team and cardiology after visit; our team will follow up with pt and family - will likely reach out to his daughter Annie tomorrow.     Cardiac/Vascular  Acute on chronic combined systolic and diastolic CHF (congestive heart failure)  - Diuresis and management per primary team and cardiology  - Discussed importance of healthy diet, taking all of his medications, and monitoring his fluid intake     NICM (nonischemic cardiomyopathy)  - EF " 10-15% on most recent echo; has ICD in place secondary to CHF  - Illness trajectory education provided to pt and his daughter  - Management per cardiology    Biventricular ICD (implantable cardioverter-defibrillator) in place  - Secondary to underlying HF     Renal/  Acute renal failure superimposed on stage 3a chronic kidney disease  - Likely cardiorenal syndrome; seems to be improving with diuresis. Should he worsen to the point of needing RRT, he would be willing to undergo this.   - Illness trajectory education provided to pt and his daughter     I will follow-up with patient. Please contact us if you have any additional questions.    ANKIT Wong  Palliative Medicine Staff   [phone]    > 50% of  35 min visit spent in chart review, face to face discussion of symptom assessment, coordination of care with other specialists, goals of care, emotional support, formulating and communicating prognosis, exploring burden/ benefit of various approaches of treatment, and discharge planning.      Subjective:     Interval History: Swelling is improving.     Medications:  Continuous Infusions:  Scheduled Meds:   amiodarone  200 mg Oral Daily    aspirin  81 mg Oral Daily    furosemide (LASIX) injection  80 mg Intravenous Q12H    heparin (porcine)  5,000 Units Subcutaneous Q8H    pantoprazole  40 mg Intravenous BID    pravastatin  80 mg Oral Daily     PRN Meds:acetaminophen, melatonin, ondansetron, prochlorperazine, senna-docusate 8.6-50 mg, sodium chloride 0.9%    Objective:     Vital Signs (Most Recent):  Temp: 97.7 °F (36.5 °C) (11/14/23 1207)  Pulse: 75 (11/14/23 1207)  Resp: (!) 21 (11/14/23 1207)  BP: 111/65 (11/14/23 1207)  SpO2: (!) 94 % (11/14/23 1207) Vital Signs (24h Range):  Temp:  [97.4 °F (36.3 °C)-97.9 °F (36.6 °C)] 97.7 °F (36.5 °C)  Pulse:  [61-75] 75  Resp:  [17-21] 21  SpO2:  [94 %-99 %] 94 %  BP: (101-111)/(64-78) 111/65     Weight: 125 kg (275 lb 9.2 oz)  Body mass index is 32.68 kg/m².        Physical Exam  Constitutional:       Comments: Sitting up in chair at bedside, polite, readily conversational    Cardiovascular:      Rate and Rhythm: Normal rate.   Pulmonary:      Effort: Pulmonary effort is normal.   Neurological:      General: No focal deficit present.      Mental Status: He is oriented to person, place, and time.       Significant Labs: All pertinent labs within the past 24 hours have been reviewed.  CBC:   Recent Labs   Lab 11/14/23  0532   WBC 5.73   HGB 12.1*   HCT 39.2*   MCV 74*   *     BMP:  Recent Labs   Lab 11/14/23  0532         K 3.6   CL 98   CO2 26   BUN 36*   CREATININE 1.8*   CALCIUM 9.5     LFT:  Lab Results   Component Value Date    AST 92 (H) 11/14/2023    ALKPHOS 95 11/14/2023    BILITOT 5.1 (H) 11/14/2023     Albumin:   Albumin   Date Value Ref Range Status   11/14/2023 3.1 (L) 3.5 - 5.2 g/dL Final     Protein:   Total Protein   Date Value Ref Range Status   11/14/2023 6.9 6.0 - 8.4 g/dL Final     Lactic acid:   Lab Results   Component Value Date    LACTATE 2.5 (H) 11/13/2023    LACTATE 3.5 (HH) 11/12/2023       Significant Imaging: I have reviewed all pertinent imaging results/findings within the past 24 hours.

## 2023-11-14 NOTE — ASSESSMENT & PLAN NOTE
- EF 10-15% on most recent echo; has ICD in place secondary to CHF  - Illness trajectory education provided to pt and his daughter  - Management per cardiology

## 2023-11-14 NOTE — ASSESSMENT & PLAN NOTE
11/14/23  - Chart and interval history reviewed; pt improving with diuresis.   - Visited with pt at bedside; he was alert, sitting up in chair at bedside, and said his leg swelling is improving, though he is having some leg cramps - offered to get him a heat pack. As PCT was in room, only stayed for a brief visit.   - With his permission, called and spoke with his very supportive daughter, Annie; introduced role of palliative medicine. As she has worked as a nursing assistance, she does have medical knowledge.   - Thorough medical update provided, as well as illness trajectory education. I let her know that her father has voiced a desire to continue maximal medical therapy, in hopes of having as much time as possible. On hearing this, she said she will always support her father's decisions; told her that is wonderful to hear that, and it sounds like she is doing a great job caring for him at home.   - She is open to home health and home-based palliative care for pt, though said final decision will be up to her father  - Let her know our team will continue to check in on pt during this and any father hospital stays    11/13/23  - Consult for introduction to palliative medicine and advance care planning in pt with severe cardiomyopathy (EF 15% on last echo, updated echo pending) currently in hospital with acute on chronic heart failure, and RIGOBERTO on CKD (likely cardiorenal syndrome). Chart reviewed; discussed pt in depth with primary staff, Dr Kennedy.   - Visited with pt at bedside; during entirety of visit, he was alert, polite, and readily conversational. Introduced role of palliative medicine, and learned more about pt outside of hospital. He has been  for several years, and has two children. He lives with his daughter Annie (preferred [facility] in the event he is unable to make his own medical decisions, son Papito Draper is alternate), as well as his 8 yo grandson. He has several grandchildren (some grown), and  "actually just recently became a great-grandfather!   - He was not able to serve in the  when younger due to being an only child, though had an interest in traveling and chose a career as a  to help facilitate this. 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   - Extensively discussed his underlying illnesses, and provided him with illness trajectory education. In discussing his overall care preferences, he said he recently talked about this with his daughter, and he told her "do whatever it takes" to keep him alive. His care preferences are consistent with maximal medical therapy, including maintaining full code status. Can further discuss this as his clinical course evolves, as he would likely have poor outcome with CPR and/or prolonged intubation.   - Updated primary team and cardiology after visit; our team will follow up with pt and family - will likely reach out to his daughter Annie tomorrow.   "

## 2023-11-14 NOTE — ASSESSMENT & PLAN NOTE
- Diuresis and management per primary team and cardiology  - Discussed importance of healthy diet, taking all of his medications, and monitoring his fluid intake

## 2023-11-14 NOTE — PROGRESS NOTES
[facility]  Cardiology  Progress Note    Patient Name: Papito Bhakta  MRN: [id number]  Admission Date: 11/10/2023  Hospital Length of Stay: 2 days  Code Status: Full Code   Attending Physician: Serenity Baker MD   Primary Care Physician: Brynn To MD  Expected Discharge Date: 11/13/2023  Principal Problem:Acute on chronic combined systolic and diastolic CHF (congestive heart failure)    Subjective:     Interval Hx: no cp/sob.  Neg about 3L since yesterday.  Feeling better.  LE edema much improved.    Tele: AS- 70s (personally reviewed and interpreted)      Past Medical History:   Diagnosis Date    RIGOBERTO (acute kidney injury) 10/7/2020    Anticoagulant long-term use     Aspirin    CHF (congestive heart failure)     Hyperlipidemia     Hypertension     NICM (nonischemic cardiomyopathy) 6/16/2020    Obesity     AMELIA (obstructive sleep apnea) 1/7/2022    Peripheral vascular disease, unspecified 2/1/2021       Past Surgical History:   Procedure Laterality Date    INSERTION OF BIVENTRICULAR IMPLANTABLE CARDIOVERTER-DEFIBRILLATOR (ICD) N/A 02/13/2019    Procedure: INSERTION, ICD, BIVENTRICULAR;  Surgeon: Shailesh Eng MD;  Location: [facility] CATH LAB;  Service: Cardiology;  Laterality: N/A;  RN PRE OP 2-6-19  1ST CASE PER  RADHA. NOTIFIED RADHA THAT ANESTHESIA IS NOT PITO FOR 1ST CASE START-LO    INSERTION OF BIVENTRICULAR IMPLANTABLE CARDIOVERTER-DEFIBRILLATOR (ICD) N/A 09/28/2020    Procedure: INSERTION, ICD, BIVENTRICULAR;  Surgeon: Jim Kwong MD;  Location: [facility] EP LAB;  Service: Cardiology;  Laterality: N/A;  NICM, CRT-D, SJM,, MAC, DM, 3 Prep*Wearing LifeVest*    oral extraction  11/2018    TESTICLE SURGERY         Review of patient's allergies indicates:   Allergen Reactions    Ramipril      Leg swelling and gynecomastia     Losartan Rash       No current facility-administered medications on file prior to encounter.     Current Outpatient Medications on File Prior to Encounter   Medication  Sig    acetaminophen (TYLENOL) 500 MG tablet Take 2 tablets (1,000 mg total) by mouth every 8 (eight) hours as needed for Pain or Temperature greater than (100.5).    amiodarone (PACERONE) 200 MG Tab Take 1 tablet (200 mg total) by mouth once daily.    aspirin (ECOTRIN) 325 MG EC tablet Take 1 tablet (325 mg total) by mouth once daily.    empagliflozin (JARDIANCE) 10 mg tablet Take 1 tablet (10 mg total) by mouth once daily.    furosemide (LASIX) 80 MG tablet TAKE 1 TABLET EVERY DAY    gabapentin (NEURONTIN) 100 MG capsule Take 1 capsule (100 mg total) by mouth 3 (three) times daily. Take 100 mg by mouth 3 (three) times daily.    hydrocortisone 1 % cream Apply topically 2 (two) times daily.    losartan (COZAAR) 25 MG tablet Take 25 mg by mouth once daily.    meloxicam (MOBIC) 15 MG tablet Take 1 tablet (15 mg total) by mouth daily as needed for Pain.    methocarbamoL (ROBAXIN) 500 MG Tab Take 2 tablets (1,000 mg total) by mouth 3 (three) times daily as needed (Pain not improved by other medications).    metoprolol succinate (TOPROL-XL) 50 MG 24 hr tablet TAKE 1 TABLET EVERY DAY    potassium chloride (K-TAB) 20 mEq TAKE 1 TABLET EVERY DAY    pravastatin (PRAVACHOL) 80 MG tablet TAKE 1 TABLET EVERY DAY    spironolactone (ALDACTONE) 25 MG tablet TAKE 1/2 TABLET (12.5 MG TOTAL) ONCE DAILY. HOLD IF SYSTOLIC BLOOD PRESSURE IS LESS THAN 110    [DISCONTINUED] ramipril (ALTACE) 10 MG capsule Take 1 capsule (10 mg total) by mouth once daily.     Family History       Problem Relation (Age of Onset)    Epilepsy Mother          Tobacco Use    Smoking status: Former     Current packs/day: 0.00     Average packs/day: 0.5 packs/day for 40.0 years (20.0 ttl pk-yrs)     Types: Cigarettes, Cigars     Start date:      Quit date:      Years since quittin.8     Passive exposure: Current    Smokeless tobacco: Never   Substance and Sexual Activity    Alcohol use: Yes     Comment: once a month beer or liquor    Drug use: No     Sexual activity: Yes     Birth control/protection: None     Comment: uses protection sometimes     Review of Systems   Gastrointestinal:  Negative for melena.   Genitourinary:  Negative for hematuria.     Objective:     Vital Signs (Most Recent):  Temp: 97.4 °F (36.3 °C) (11/14/23 0736)  Pulse: 61 (11/14/23 0736)  Resp: 17 (11/14/23 0736)  BP: 101/64 (11/14/23 0736)  SpO2: 99 % (11/14/23 0736) Vital Signs (24h Range):  Temp:  [97.4 °F (36.3 °C)-97.9 °F (36.6 °C)] 97.4 °F (36.3 °C)  Pulse:  [60-71] 61  Resp:  [17-20] 17  SpO2:  [97 %-100 %] 99 %  BP: (101-117)/(64-78) 101/64     Weight: 125 kg (275 lb 9.2 oz)  Body mass index is 32.68 kg/m².    SpO2: 99 %         Intake/Output Summary (Last 24 hours) at 11/14/2023 0803  Last data filed at 11/14/2023 0635  Gross per 24 hour   Intake 600 ml   Output 3450 ml   Net -2850 ml         Lines/Drains/Airways       None                    Physical Exam  Constitutional:       General: He is not in acute distress.     Appearance: He is well-developed. He is obese. He is not ill-appearing, toxic-appearing or diaphoretic.   HENT:      Head: Normocephalic and atraumatic.   Eyes:      General: No scleral icterus.     Extraocular Movements: Extraocular movements intact.      Conjunctiva/sclera: Conjunctivae normal.      Pupils: Pupils are equal, round, and reactive to light.   Neck:      Thyroid: No thyromegaly.      Vascular: JVD present.      Trachea: No tracheal deviation.   Cardiovascular:      Rate and Rhythm: Normal rate and regular rhythm.      Heart sounds: S1 normal and S2 normal. Heart sounds are distant. No murmur heard.     No friction rub. No gallop.   Pulmonary:      Effort: Pulmonary effort is normal. No respiratory distress.      Breath sounds: Normal breath sounds. No stridor. No wheezing, rhonchi or rales.   Chest:      Chest wall: No tenderness.   Abdominal:      General: There is no distension.      Palpations: Abdomen is soft.   Musculoskeletal:         General:  No tenderness. Normal range of motion.      Cervical back: Normal range of motion and neck supple. No rigidity.      Right lower leg: Edema present.      Left lower leg: Edema present.   Skin:     General: Skin is warm and dry.      Coloration: Skin is not jaundiced.   Neurological:      General: No focal deficit present.      Mental Status: He is alert and oriented to person, place, and time.      Cranial Nerves: No cranial nerve deficit.   Psychiatric:         Mood and Affect: Mood normal.         Behavior: Behavior normal.          Current Medications:   amiodarone  200 mg Oral Daily    aspirin  81 mg Oral Daily    furosemide (LASIX) injection  80 mg Intravenous Q8H    heparin (porcine)  5,000 Units Subcutaneous Q8H    metOLazone  5 mg Oral Daily    pantoprazole  40 mg Intravenous BID    pravastatin  80 mg Oral Daily       acetaminophen, melatonin, ondansetron, prochlorperazine, senna-docusate 8.6-50 mg, sodium chloride 0.9%    Laboratory (all labs reviewed):  CBC:  Recent Labs   Lab 05/12/23  1622 11/10/23  2146 11/12/23  0408 11/13/23  0539 11/14/23  0532   WBC 4.68 5.15 5.17 5.88 5.73   Hemoglobin 12.8 L 12.9 L 12.3 L 11.8 L 12.1 L   Hematocrit 39.8 L 41.6 41.5 37.5 L 39.2 L   Platelets 145 L 116 L 130 L 128 L 132 L         CHEMISTRIES:  Recent Labs   Lab 11/19/20  0300 11/20/20  0243 11/21/20  0622 03/11/21  1009 03/31/21  1808 09/15/21  0940 07/18/22  1251 08/05/22  1119 11/10/23  2146 11/11/23  0344 11/12/23  0408 11/13/23  0539 11/14/23  0532   Glucose 89 84 90 88 90 90 94   < > 90 82 77 97 100   Sodium 142 142 142 140 141 137 138   < > 139 138 137 136 139   Potassium 3.1 L 3.2 L 3.5 4.7 4.3 4.5 4.6   < > 4.3 4.1 4.5 4.2 3.6   BUN 16 14 13 27 H 16 15 21   < > 33 H 33 H 36 H 39 H 36 H   Creatinine 1.1 0.9 0.8 1.5 H 1.3 1.2 1.5 H   < > 1.9 H 1.8 H 2.1 H 2.1 H 1.8 H   eGFR if non African American >60.0 >60.0 >60.0 48 A 57.3 A >60.0 47.5 A  --   --   --   --   --   --    eGFR  --   --   --   --   --   --   --     < > 38 A 40 A 34 A 34 A 40 A   Calcium 8.7 8.8 9.0 9.1 9.0 9.4 9.5   < > 9.8 9.6 9.7 9.4 9.5   Magnesium 1.8 1.7 1.7  --   --   --   --   --  2.0  --   --   --   --     < > = values in this interval not displayed.         CARDIAC BIOMARKERS:  Recent Labs   Lab 11/10/23  2146 11/11/23  0344   Troponin I 0.014 0.028 H         COAGS:  Recent Labs   Lab 03/31/21 1808 04/02/21  1157 11/11/23  0344   INR 1.0 1.0 1.7 H         LIPIDS/LFTS:  Recent Labs   Lab 11/18/20  1058 03/31/21  1808 07/18/22  1251 10/14/22  1020 09/14/23  1625 11/10/23  2146 11/11/23  0344 11/12/23  0408 11/13/23  0539 11/14/23  0532   Cholesterol 143  --  148  --  103 L  --   --   --   --   --    Triglycerides 82  --  90  --  51  --   --   --   --   --    HDL 49  --  51  --  35 L  --   --   --   --   --    LDL Cholesterol 77.6  --  79.0  --  57.8 L  --   --   --   --   --    Non-HDL Cholesterol 94  --  97  --  68  --   --   --   --   --    AST 15   < > 15   < >  --  46 H 53 H 74 H 104 H 92 H   ALT 9 L   < > 9 L   < >  --  46 H 47 H 61 H 84 H 85 H    < > = values in this interval not displayed.         BNP:  Recent Labs   Lab 11/18/20  1058 08/25/23  0940 11/10/23  2146    H 525 H 1,592 H         TSH:  Recent Labs   Lab 05/12/21  0929 07/18/22  1251 09/14/23  1625   TSH 3.421 1.729 2.764         Free T4:            Diagnostic Results:  ECG (personally reviewed and interpreted tracing(s)):  11/10/23 1746 AS-BiV paced 66    Chest X-Ray (personally reviewed and interpreted image(s)): 11/10/23 CHF, CMeg, R PPM 3 leads    Echo: 11/12/23 (images prev personally reviewed and interpreted)  stable findings vs report 8/18/23    Left Ventricle: The left ventricle is severely dilated. Mildly increased wall thickness. There is mild concentric hypertrophy. Severe global hypokinesis present. There is severely reduced systolic function with a visually estimated ejection fraction of 10 -15%. Grade III diastolic dysfunction.    Right Ventricle: Severe right  ventricular enlargement. Systolic function is severely reduced.    Mitral Valve: There is no stenosis. There is mild to moderate regurgitation with a centrally directed jet.    Tricuspid Valve: There is mild to moderate regurgitation.    Pulmonary Artery: The estimated pulmonary artery systolic pressure is 67 mmHg.    Cath: 2/1/18    Normal coronary arteries.     Systolic dysfunction.     Low right and left Filling Pressures.     Mild Pulmonary Hypertension.     Assessment and Plan:     * Acute on chronic combined systolic and diastolic CHF (congestive heart failure)  Known severe NICM (neg cath 2018) s/p BiV ICD  Prev intol of ACE/ARB, entresto was too pricey  Now with progressive CHF and elev creat c/w cardiorenal syndrome  Neg 3L over last 24 hrs  Dec lasix 80mg iv bid  Stop metolazone  Consider lasix gtt +/-  as contingency  Eventual hydrala/Imdur if BP will allow  Hold BBL/aldactone  Consider as a candidate for CardioMEMS implant    NICM (nonischemic cardiomyopathy)  As above    Acute renal failure superimposed on stage 3a chronic kidney disease  As above  Creat stable at 2.1->1.8 today    Essential hypertension  As above    Hyperlipidemia  Cont statin    Biventricular ICD (implantable cardioverter-defibrillator) in place  As above, appears to be functioning normally    Advance care planning  Palliative care consult ordered.  Appreciate pall care eval.          VTE Risk Mitigation (From admission, onward)           Ordered     heparin (porcine) injection 5,000 Units  Every 8 hours         11/11/23 0913     IP VTE HIGH RISK PATIENT  Once         11/10/23 2117     Place sequential compression device  Until discontinued         11/10/23 2117                    Sridhar Hathaway MD  Cardiology  [facility]
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
## 2023-11-14 NOTE — SUBJECTIVE & OBJECTIVE
Interval History: Swelling is improving.     Medications:  Continuous Infusions:  Scheduled Meds:   amiodarone  200 mg Oral Daily    aspirin  81 mg Oral Daily    furosemide (LASIX) injection  80 mg Intravenous Q12H    heparin (porcine)  5,000 Units Subcutaneous Q8H    pantoprazole  40 mg Intravenous BID    pravastatin  80 mg Oral Daily     PRN Meds:acetaminophen, melatonin, ondansetron, prochlorperazine, senna-docusate 8.6-50 mg, sodium chloride 0.9%    Objective:     Vital Signs (Most Recent):  Temp: 97.7 °F (36.5 °C) (11/14/23 1207)  Pulse: 75 (11/14/23 1207)  Resp: (!) 21 (11/14/23 1207)  BP: 111/65 (11/14/23 1207)  SpO2: (!) 94 % (11/14/23 1207) Vital Signs (24h Range):  Temp:  [97.4 °F (36.3 °C)-97.9 °F (36.6 °C)] 97.7 °F (36.5 °C)  Pulse:  [61-75] 75  Resp:  [17-21] 21  SpO2:  [94 %-99 %] 94 %  BP: (101-111)/(64-78) 111/65     Weight: 125 kg (275 lb 9.2 oz)  Body mass index is 32.68 kg/m².       Physical Exam  Constitutional:       Comments: Sitting up in chair at bedside, polite, readily conversational    Cardiovascular:      Rate and Rhythm: Normal rate.   Pulmonary:      Effort: Pulmonary effort is normal.   Neurological:      General: No focal deficit present.      Mental Status: He is oriented to person, place, and time.       Significant Labs: All pertinent labs within the past 24 hours have been reviewed.  CBC:   Recent Labs   Lab 11/14/23  0532   WBC 5.73   HGB 12.1*   HCT 39.2*   MCV 74*   *     BMP:  Recent Labs   Lab 11/14/23  0532         K 3.6   CL 98   CO2 26   BUN 36*   CREATININE 1.8*   CALCIUM 9.5     LFT:  Lab Results   Component Value Date    AST 92 (H) 11/14/2023    ALKPHOS 95 11/14/2023    BILITOT 5.1 (H) 11/14/2023     Albumin:   Albumin   Date Value Ref Range Status   11/14/2023 3.1 (L) 3.5 - 5.2 g/dL Final     Protein:   Total Protein   Date Value Ref Range Status   11/14/2023 6.9 6.0 - 8.4 g/dL Final     Lactic acid:   Lab Results   Component Value Date    LACTATE  2.5 (H) 11/13/2023    LACTATE 3.5 (HH) 11/12/2023       Significant Imaging: I have reviewed all pertinent imaging results/findings within the past 24 hours.

## 2023-11-14 NOTE — ASSESSMENT & PLAN NOTE
- Likely cardiorenal syndrome; seems to be improving with diuresis. Should he worsen to the point of needing RRT, he would be willing to undergo this.   - Illness trajectory education provided to pt and his daughter

## 2023-11-14 NOTE — SUBJECTIVE & OBJECTIVE
Interval History: denies any shortness of breath, nausea, vomiting, abdominal pain. Still has leg swelling, less tense than prior.     Review of Systems   Constitutional:  Negative for chills and fever.   Respiratory:  Negative for cough, chest tightness, shortness of breath and wheezing.    Cardiovascular:  Positive for leg swelling. Negative for chest pain and palpitations.   Gastrointestinal:  Negative for abdominal distention, abdominal pain, blood in stool, constipation, diarrhea, nausea and vomiting.   Genitourinary:  Negative for difficulty urinating.   Psychiatric/Behavioral:  Negative for confusion.      Objective:     Vital Signs (Most Recent):  Temp: 97.7 °F (36.5 °C) (11/14/23 1207)  Pulse: 75 (11/14/23 1207)  Resp: (!) 21 (11/14/23 1207)  BP: 111/65 (11/14/23 1207)  SpO2: (!) 94 % (11/14/23 1207) Vital Signs (24h Range):  Temp:  [97.4 °F (36.3 °C)-97.9 °F (36.6 °C)] 97.7 °F (36.5 °C)  Pulse:  [61-75] 75  Resp:  [17-21] 21  SpO2:  [94 %-99 %] 94 %  BP: (101-111)/(64-78) 111/65     Weight: 125 kg (275 lb 9.2 oz)  Body mass index is 32.68 kg/m².    Intake/Output Summary (Last 24 hours) at 11/14/2023 1546  Last data filed at 11/14/2023 1207  Gross per 24 hour   Intake 840 ml   Output 3150 ml   Net -2310 ml           Physical Exam  Vitals and nursing note reviewed.   Constitutional:       General: He is not in acute distress.     Appearance: He is obese. He is ill-appearing (chronically). He is not toxic-appearing.   HENT:      Head: Normocephalic and atraumatic.      Nose: Nose normal.      Mouth/Throat:      Mouth: Mucous membranes are moist.   Eyes:      General: Scleral icterus present.   Cardiovascular:      Rate and Rhythm: Normal rate and regular rhythm.      Comments: R chest BiV ICD  Pulmonary:      Effort: Pulmonary effort is normal.      Breath sounds: No wheezing or rhonchi.      Comments: 3L NC  Abdominal:      General: Bowel sounds are normal. There is no distension.      Palpations: Abdomen is  soft. There is no mass.      Tenderness: There is no abdominal tenderness. There is no guarding or rebound.   Musculoskeletal:      Right lower leg: Edema present.      Left lower leg: Edema present.   Skin:     General: Skin is warm and dry.   Neurological:      Mental Status: He is alert. Mental status is at baseline.             Significant Labs: All pertinent labs within the past 24 hours have been reviewed.    Significant Imaging: I have reviewed all pertinent imaging results/findings within the past 24 hours.

## 2023-11-14 NOTE — PT/OT/SLP EVAL
"Occupational Therapy   Evaluation    Name: Papito Bhakta  MRN: [id number]  Admitting Diagnosis: Acute on chronic combined systolic and diastolic CHF (congestive heart failure)  Recent Surgery: * No surgery found *      Recommendations:     Discharge Recommendations: Low Intensity Therapy (w/ assistance)  Discharge Equipment Recommendations:  none  Barriers to discharge:  None    Assessment:     Papito Bhakta is a 68 y.o. male with a medical diagnosis of Acute on chronic combined systolic and diastolic CHF (congestive heart failure).   Performance deficits affecting function: weakness, impaired endurance, impaired self care skills, impaired functional mobility, gait instability, impaired balance, decreased lower extremity function, decreased safety awareness, impaired cardiopulmonary response to activity, edema.      Pt pleasant and willing to participate in tx session this date; pt was able to ambulate functional distances w/ CGA-SBA and use of personal crutches for completing transfers and ADLs. Pt w/ spO2 95% on RA; no SOB. Pt will benefit from LOW intensity therapy w/ caregiver support once d/c home.     Rehab Prognosis: Good; patient would benefit from acute skilled OT services to address these deficits and reach maximum level of function.       Plan:     Patient to be seen 3 x/week to address the above listed problems via self-care/home management, therapeutic activities, therapeutic exercises  Plan of Care Expires: 11/28/23  Plan of Care Reviewed with: patient    Subjective     Chief Complaint: Swelling in ankle  Patient/Family Comments/goals: "I am okay; nothing to brag on."     Occupational Profile:  Living Environment: Pt lives with his daughter and GS in a [facility] with no concerns.    Previous level of function: Patients level of function was Modified Independent with AC's for ambulation and ADL's   Roles and Routines: none stated   Equipment Used at Home: bath bench, crutches, axillary, grab bar, raised " toilet  Assistance upon Discharge: dtr    Pain/Comfort:  Pain Rating 1: 0/10  Pain Rating Post-Intervention 1: 0/10    Patients cultural, spiritual, Rastafarian conflicts given the current situation: no    Objective:     Communicated with: Nurse prior to session.  Patient found sitting edge of bed  w/ PT present and with oxygen, telemetry upon OT entry to room.    General Precautions: Standard, fall, respiratory  Orthopedic Precautions: N/A  Braces: N/A  Respiratory Status: Room air    Occupational Performance:    Bed Mobility:    Pt found sitting EOB w/ PT present.     Functional Mobility/Transfers:  Patient completed Sit <> Stand Transfer with stand by assistance and contact guard assistance  with  axillary crutches   Patient completed Bed <> Chair Transfer using Step Transfer technique with stand by assistance and contact guard assistance with axillary crutches  Functional Mobility: Pt was able to ambulate functional distances in room and unit w/ CGA-SBA and use of personal crutches. Refer to PT eval for assessment.     Activities of Daily Living:  Grooming: stand by assistance for washing hands and face at sink   Upper Body Dressing: minimum assistance for donning gown over back   Toileting: supervision for using urinal in standing    Cognitive/Visual Perceptual:  Cognitive/Psychosocial Skills:     -       Oriented to: Person, Place, Time, and Situation   -       Follows Commands/attention:Follows multistep  commands  -       Communication: clear/fluent  -       Memory: No Deficits noted  -       Safety awareness/insight to disability: intact     Physical Exam:  Balance:    -       good sitting balance; fair + standing   Postural examination/scapula alignment:    -       Rounded shoulders  -       Forward head  Skin integrity: Visible skin intact  Edema:  Mild RLE  Sensation:    -       Intact  Upper Extremity Range of Motion:     -       Right Upper Extremity: limited shoulder flexion   -       Left Upper  Extremity: WFL  Upper Extremity Strength:    -       Right Upper Extremity: grossly 3-/5 for shoulder; WFL distally   -       Left Upper Extremity: grossly 3-/5 for shoulder; WFL distally    Strength:    -       Right Upper Extremity: WFL  -       Left Upper Extremity: WFL    AMPAC 6 Click ADL:  AMPAC Total Score: 22    Treatment & Education:  -Initial eval complete.   -Pt educated on role of OT and POC.     Patient left up in chair with all lines intact and call button in reach    GOALS:   Multidisciplinary Problems       Occupational Therapy Goals          Problem: Occupational Therapy    Goal Priority Disciplines Outcome Interventions   Occupational Therapy Goal     OT, PT/OT Ongoing, Progressing    Description: Goals to be met by: 11/28/23     Patient will increase functional independence with ADLs by performing:    LE Dressing with Modified [place].  Grooming while standing at sink with Modified [place].  Toileting from toilet with Modified [place] for hygiene and clothing management.   Step transfer with Modified [place]  Toilet transfer to toilet with Modified [place].  Upper extremity exercise program x15 reps per handout, with independence.  Implementation of PLB and energy conservation strategies into daily routines w/ (I).                          History:     Past Medical History:   Diagnosis Date    RIGOBERTO (acute kidney injury) 10/7/2020    Anticoagulant long-term use     Aspirin    CHF (congestive heart failure)     Hyperlipidemia     Hypertension     NICM (nonischemic cardiomyopathy) 6/16/2020    Obesity     AMELIA (obstructive sleep apnea) 1/7/2022    Peripheral vascular disease, unspecified 2/1/2021         Past Surgical History:   Procedure Laterality Date    INSERTION OF BIVENTRICULAR IMPLANTABLE CARDIOVERTER-DEFIBRILLATOR (ICD) N/A 02/13/2019    Procedure: INSERTION, ICD, BIVENTRICULAR;  Surgeon: Shailesh Eng MD;  Location: [facility] CATH LAB;  Service: Cardiology;   Laterality: N/A;  RN PRE OP 2-6-19  1ST CASE PER  [facility]. NOTIFIED [facility] THAT ANESTHESIA IS NOT PITO FOR 1ST CASE START-LO    INSERTION OF BIVENTRICULAR IMPLANTABLE CARDIOVERTER-DEFIBRILLATOR (ICD) N/A 09/28/2020    Procedure: INSERTION, ICD, BIVENTRICULAR;  Surgeon: Jim Kwong MD;  Location: [facility] LAB;  Service: Cardiology;  Laterality: N/A;  NICM, CRT-D, SJM,, MAC, DM, 3 Prep*Wearing LifeVest*    oral extraction  11/2018    TESTICLE SURGERY         Time Tracking:     OT Date of Treatment: 11/14/23  OT Start Time: 1015  OT Stop Time: 1030  OT Total Time (min): 15 min    Billable Minutes:Evaluation 15  Total Time 15 (co-eval w/ PT)     11/14/2023

## 2023-11-14 NOTE — PT/OT/SLP EVAL
Physical Therapy Evaluation    Patient Name:  Papito Bhakta   MRN:  [id number]    Recommendations:     Discharge Recommendations: Low Intensity Therapy   Discharge Equipment Recommendations: none   Barriers to discharge:  None fro PT standpoint    Assessment:     Papito Bhakta is a 68 y.o. male admitted with a medical diagnosis of Acute on chronic combined systolic and diastolic CHF (congestive heart failure).  He presents with the following impairments/functional limitations: weakness, impaired balance, decreased safety awareness, impaired endurance, impaired self care skills, decreased coordination, impaired functional mobility, decreased upper extremity function, impaired coordination, gait instability .    Rehab Prognosis: Good; patient would benefit from acute skilled PT services to address these deficits and reach maximum level of function.    Recent Surgery: * No surgery found *      Plan:     During this hospitalization, patient to be seen  (3-5x/wk) to address the identified rehab impairments via gait training, therapeutic activities, therapeutic exercises and progress toward the following goals:    Plan of Care Expires:  11/21/23    Subjective     Chief Complaint: swelling in R ankle   Patient/Family Comments/goals: Pt agreeable to eval and to remain up in chair following.  Pt states that his swelling is much better today  Pain/Comfort:  Pain Rating 1: 0/10    Patients cultural, spiritual, Adventist conflicts given the current situation: no    Living Environment:  Pt lives with his daughter and GS in a [facility] with no concerns  Prior to admission, patients level of function was Modified Independent with AC's for ambulation and ADL's.  Equipment used at home: bath bench, crutches, axillary, grab bar, raised toilet.  DME owned (not currently used): rolling walker and rollator .  Upon discharge, patient will have assistance from Daughter.    Objective:     Communicated with nsg prior to session.  Patient found   slid down in bed with B LE's partially hanging over bed and touching foot board   with oxygen  upon PT entry to room.    General Precautions: Standard, fall  Orthopedic Precautions:N/A   Braces: N/A  Respiratory Status: Nasal cannula, flow 3 L/min    Exams:  Cognitive Exam:  Patient is oriented to Person, Place, Time, and Situation  Gross Motor Coordination:  impaired 22 weakness and deconditioning  Postural Exam:  Patient presented with the following abnormalities:    -       Rounded shoulders  -       Forward head  -       Abnormal trunk flexion  Sensation:    -       Intact  light/touch B LE's  Skin Integrity/Edema:      -       Skin integrity: Visible skin intact  -       Edema: Mild R LE  RLE ROM: WFL  RLE Strength: WFL  LLE ROM: WFL  LLE Strength: WFL    Functional Mobility:  Bed Mobility:     Scooting: stand by assistance  Supine to Sit: stand by assistance  Transfers:     Sit to Stand:  contact guard assistance and with Vc/TC for hand placement, crutch management/safety,  and forward weight shift over DANTE  with axillary crutches and from elevated bed  Gait: Gait training with AC's and CGA with VC/TC for increased trunk ext and crutch management/safety approx 85' total  Balance: Fair+ sit, Fair stand      AM-PAC 6 CLICK MOBILITY  Total Score:18       Treatment & Education:  Pt SOB upon sitting at EOB.  Educated pt on Pursed lip breathing technique.  SPO2 on 3L O2 at rest 100%, SPO2 on RA at rest 98%, SPO2 on RA with ambulation 95% with nursing notified.  Nursing OK'd pt to remain on RA after session.  Pt stood at commode to urinate with urinal and AC's with Supervision.  Pt performed hand hygiene with same standing at sink.      Patient left up in chair with call button in reach and nsg notified.    GOALS:   Multidisciplinary Problems       Physical Therapy Goals          Problem: Physical Therapy    Goal Priority Disciplines Outcome Goal Variances Interventions   Physical Therapy Goal     PT, PT/OT Ongoing,  Progressing     Description: Goals to be met by: 23     Patient will increase functional independence with mobility by performin. Supine to sit with Modified [place]  2. Sit to stand transfer with Modified [place]  3. Gait  x 75 feet with Modified [place] using RW vs Axillary crutches.   4. Lower extremity exercise program x10 reps per handout, with supervision                         History:     Past Medical History:   Diagnosis Date    RIGOBERTO (acute kidney injury) 10/7/2020    Anticoagulant long-term use     Aspirin    CHF (congestive heart failure)     Hyperlipidemia     Hypertension     NICM (nonischemic cardiomyopathy) 2020    Obesity     AMELIA (obstructive sleep apnea) 2022    Peripheral vascular disease, unspecified 2021       Past Surgical History:   Procedure Laterality Date    INSERTION OF BIVENTRICULAR IMPLANTABLE CARDIOVERTER-DEFIBRILLATOR (ICD) N/A 2019    Procedure: INSERTION, ICD, BIVENTRICULAR;  Surgeon: Shailesh Eng MD;  Location: [facility] CATH LAB;  Service: Cardiology;  Laterality: N/A;  RN PRE OP 19  1ST CASE PER  [facility]. NOTIFIED [facility] THAT ANESTHESIA IS NOT PITO FOR 1ST CASE START-LO    INSERTION OF BIVENTRICULAR IMPLANTABLE CARDIOVERTER-DEFIBRILLATOR (ICD) N/A 2020    Procedure: INSERTION, ICD, BIVENTRICULAR;  Surgeon: Jim Kwong MD;  Location: [facility] EP LAB;  Service: Cardiology;  Laterality: N/A;  NICM, CRT-D, SJM,, MAC, DM, 3 Prep*Wearing LifeVest*    oral extraction  2018    TESTICLE SURGERY         Time Tracking:     PT Received On: 23  PT Start Time: 1005     PT Stop Time: 1028  PT Total Time (min): 23 min     Billable Minutes: Evaluation 15 and Therapeutic Activity 8      2023

## 2023-11-14 NOTE — ASSESSMENT & PLAN NOTE
Presents with SOB and lower extremity edema, BNP 1200, chest x-ray with pulmonary vascular congestion. He reports he began adding salt to his food due to concern that he would lose all of his salt due to diuretic    Patient is identified as having Combined Systolic and Diastolic heart failure that is Acute on chronic. CHF is currently uncontrolled due to Continued edema of extremities, Dyspnea not returned to baseline after 1 doses of IV diuretic and Pulmonary edema/pleural effusion on CXR. Latest ECHO performed and demonstrates- Results for orders placed during the hospital encounter of 08/18/23    Echo    Interpretation Summary    Left Ventricle: The left ventricle is severely dilated. Increased ventricular mass. Normal wall thickness. Global hypokinesis present. There is severely reduced systolic function with a visually estimated ejection fraction of 15 - 20%. There is diastolic dysfunction.    Left Atrium: Left atrium is severely dilated.    Right Ventricle: Severe right ventricular enlargement. Wall thickness is normal. Systolic function is moderately reduced. Pacemaker lead present in the ventricle.    Right Atrium: Right atrium is dilated.    Mitral Valve: Mild mitral annular calcification. There is moderate regurgitation.    Tricuspid Valve: There is mild regurgitation.    Pulmonic Valve: There is mild regurgitation.    Pulmonary Artery: The estimated pulmonary artery systolic pressure is 59 mmHg.    IVC/SVC: Elevated venous pressure at 15 mmHg.  Monitor on telemetry. Patient is on CHF pathway.  Monitor strict Is&Os and daily weights.  Place on fluid restriction of 1.5 L. Cardiology has been consulted. Continue to stress to patient importance of self efficacy and  on diet for CHF. Last BNP reviewed- and noted below   Recent Labs   Lab 11/10/23  2146   BNP 1,592*       - with RIGOBERTO on CKD3a- cardiorenal syndrome  - with elevated TBili- due to hepatic congestion  - with lactic 3.5-- normotensive  cardiogenic shock  - hold BB, spironolactone. entresto was too expensive and did not tolerate ACEi/ARB. Consider bidil when improves  - increased lasix to 80mg IV TID on 11/12 and added metolazone 5mg daily on 11/13   - Cardiology following- next step would be dobutamine gtt in ICU  - Palliative consulted  -11/14: lasix dec to bid and metolazone d/c.

## 2023-11-14 NOTE — PLAN OF CARE
Problem: Occupational Therapy  Goal: Occupational Therapy Goal  Description: Goals to be met by: 11/28/23     Patient will increase functional independence with ADLs by performing:    LE Dressing with Modified [place].  Grooming while standing at sink with Modified [place].  Toileting from toilet with Modified [place] for hygiene and clothing management.   Step transfer with Modified [place]  Toilet transfer to toilet with Modified [place].  Upper extremity exercise program x15 reps per handout, with independence.  Implementation of PLB and energy conservation strategies into daily routines w/ (I).     Outcome: Ongoing, Progressing

## 2023-11-14 NOTE — ASSESSMENT & PLAN NOTE
Known severe NICM (neg cath 2018) s/p BiV ICD  Prev intol of ACE/ARB, entresto was too pricey  Now with progressive CHF and elev creat c/w cardiorenal syndrome  Neg 3L over last 24 hrs  Dec lasix 80mg iv bid  Stop metolazone  Consider lasix gtt +/-  as contingency  Eventual hydrala/Imdur if BP will allow  Hold BBL/aldactone  Consider as a candidate for CardioMEMS implant

## 2023-11-15 VITALS
HEART RATE: 73 BPM | OXYGEN SATURATION: 98 % | BODY MASS INDEX: 28.63 KG/M2 | HEIGHT: 77 IN | DIASTOLIC BLOOD PRESSURE: 65 MMHG | TEMPERATURE: 98 F | SYSTOLIC BLOOD PRESSURE: 101 MMHG | WEIGHT: 242.5 LBS | RESPIRATION RATE: 18 BRPM

## 2023-11-15 LAB
ALBUMIN SERPL BCP-MCNC: 3 G/DL (ref 3.5–5.2)
ALP SERPL-CCNC: 98 U/L (ref 55–135)
ALT SERPL W/O P-5'-P-CCNC: 75 U/L (ref 10–44)
ANION GAP SERPL CALC-SCNC: 14 MMOL/L (ref 8–16)
AST SERPL-CCNC: 71 U/L (ref 10–40)
BASOPHILS # BLD AUTO: 0.03 K/UL (ref 0–0.2)
BASOPHILS NFR BLD: 0.4 % (ref 0–1.9)
BILIRUB SERPL-MCNC: 5.4 MG/DL (ref 0.1–1)
BUN SERPL-MCNC: 31 MG/DL (ref 8–23)
CALCIUM SERPL-MCNC: 9.9 MG/DL (ref 8.7–10.5)
CHLORIDE SERPL-SCNC: 90 MMOL/L (ref 95–110)
CO2 SERPL-SCNC: 35 MMOL/L (ref 23–29)
CREAT SERPL-MCNC: 1.7 MG/DL (ref 0.5–1.4)
DIFFERENTIAL METHOD: ABNORMAL
EOSINOPHIL # BLD AUTO: 0 K/UL (ref 0–0.5)
EOSINOPHIL NFR BLD: 0.4 % (ref 0–8)
ERYTHROCYTE [DISTWIDTH] IN BLOOD BY AUTOMATED COUNT: 18.9 % (ref 11.5–14.5)
EST. GFR  (NO RACE VARIABLE): 43 ML/MIN/1.73 M^2
GLUCOSE SERPL-MCNC: 99 MG/DL (ref 70–110)
HCT VFR BLD AUTO: 40.1 % (ref 40–54)
HGB BLD-MCNC: 12.3 G/DL (ref 14–18)
IMM GRANULOCYTES # BLD AUTO: 0.03 K/UL (ref 0–0.04)
IMM GRANULOCYTES NFR BLD AUTO: 0.4 % (ref 0–0.5)
LYMPHOCYTES # BLD AUTO: 0.9 K/UL (ref 1–4.8)
LYMPHOCYTES NFR BLD: 13.4 % (ref 18–48)
MAGNESIUM SERPL-MCNC: 1.8 MG/DL (ref 1.6–2.6)
MCH RBC QN AUTO: 22.5 PG (ref 27–31)
MCHC RBC AUTO-ENTMCNC: 30.7 G/DL (ref 32–36)
MCV RBC AUTO: 73 FL (ref 82–98)
MONOCYTES # BLD AUTO: 1.3 K/UL (ref 0.3–1)
MONOCYTES NFR BLD: 19.2 % (ref 4–15)
NEUTROPHILS # BLD AUTO: 4.5 K/UL (ref 1.8–7.7)
NEUTROPHILS NFR BLD: 66.2 % (ref 38–73)
NRBC BLD-RTO: 0 /100 WBC
PLATELET # BLD AUTO: 131 K/UL (ref 150–450)
PMV BLD AUTO: ABNORMAL FL (ref 9.2–12.9)
POTASSIUM SERPL-SCNC: 3.4 MMOL/L (ref 3.5–5.1)
PROT SERPL-MCNC: 7.1 G/DL (ref 6–8.4)
RBC # BLD AUTO: 5.46 M/UL (ref 4.6–6.2)
SODIUM SERPL-SCNC: 139 MMOL/L (ref 136–145)
WBC # BLD AUTO: 6.77 K/UL (ref 3.9–12.7)

## 2023-11-15 PROCEDURE — 85025 COMPLETE CBC W/AUTO DIFF WBC: CPT | Performed by: HOSPITALIST

## 2023-11-15 PROCEDURE — 97535 SELF CARE MNGMENT TRAINING: CPT

## 2023-11-15 PROCEDURE — 97530 THERAPEUTIC ACTIVITIES: CPT

## 2023-11-15 PROCEDURE — 83735 ASSAY OF MAGNESIUM: CPT | Performed by: FAMILY MEDICINE

## 2023-11-15 PROCEDURE — 36415 COLL VENOUS BLD VENIPUNCTURE: CPT | Performed by: HOSPITALIST

## 2023-11-15 PROCEDURE — 25000003 PHARM REV CODE 250: Performed by: INTERNAL MEDICINE

## 2023-11-15 PROCEDURE — 25000003 PHARM REV CODE 250: Performed by: PHYSICIAN ASSISTANT

## 2023-11-15 PROCEDURE — C9113 INJ PANTOPRAZOLE SODIUM, VIA: HCPCS | Performed by: HOSPITALIST

## 2023-11-15 PROCEDURE — 25000003 PHARM REV CODE 250: Performed by: FAMILY MEDICINE

## 2023-11-15 PROCEDURE — 63600175 PHARM REV CODE 636 W HCPCS: Performed by: HOSPITALIST

## 2023-11-15 PROCEDURE — 25000003 PHARM REV CODE 250: Performed by: HOSPITALIST

## 2023-11-15 PROCEDURE — 99233 PR SUBSEQUENT HOSPITAL CARE,LEVL III: ICD-10-PCS | Mod: ,,, | Performed by: INTERNAL MEDICINE

## 2023-11-15 PROCEDURE — 99233 SBSQ HOSP IP/OBS HIGH 50: CPT | Mod: ,,, | Performed by: INTERNAL MEDICINE

## 2023-11-15 PROCEDURE — 80053 COMPREHEN METABOLIC PANEL: CPT | Performed by: HOSPITALIST

## 2023-11-15 PROCEDURE — 97110 THERAPEUTIC EXERCISES: CPT

## 2023-11-15 PROCEDURE — 27000221 HC OXYGEN, UP TO 24 HOURS

## 2023-11-15 RX ORDER — METOPROLOL SUCCINATE 25 MG/1
25 TABLET, EXTENDED RELEASE ORAL DAILY
Status: DISCONTINUED | OUTPATIENT
Start: 2023-11-15 | End: 2023-11-15 | Stop reason: HOSPADM

## 2023-11-15 RX ORDER — FUROSEMIDE 40 MG/1
40 TABLET ORAL 2 TIMES DAILY
Status: DISCONTINUED | OUTPATIENT
Start: 2023-11-15 | End: 2023-11-15 | Stop reason: HOSPADM

## 2023-11-15 RX ORDER — SPIRONOLACTONE 25 MG/1
25 TABLET ORAL DAILY
Status: DISCONTINUED | OUTPATIENT
Start: 2023-11-15 | End: 2023-11-15 | Stop reason: HOSPADM

## 2023-11-15 RX ORDER — POTASSIUM CHLORIDE 20 MEQ/1
40 TABLET, EXTENDED RELEASE ORAL ONCE
Status: COMPLETED | OUTPATIENT
Start: 2023-11-15 | End: 2023-11-15

## 2023-11-15 RX ADMIN — FUROSEMIDE 40 MG: 40 TABLET ORAL at 09:11

## 2023-11-15 RX ADMIN — PANTOPRAZOLE SODIUM 40 MG: 40 INJECTION, POWDER, FOR SOLUTION INTRAVENOUS at 09:11

## 2023-11-15 RX ADMIN — AMIODARONE HYDROCHLORIDE 200 MG: 200 TABLET ORAL at 09:11

## 2023-11-15 RX ADMIN — PRAVASTATIN SODIUM 80 MG: 40 TABLET ORAL at 09:11

## 2023-11-15 RX ADMIN — METOPROLOL SUCCINATE 25 MG: 25 TABLET, EXTENDED RELEASE ORAL at 09:11

## 2023-11-15 RX ADMIN — POTASSIUM CHLORIDE 40 MEQ: 1500 TABLET, EXTENDED RELEASE ORAL at 07:11

## 2023-11-15 RX ADMIN — HEPARIN SODIUM 5000 UNITS: 5000 INJECTION INTRAVENOUS; SUBCUTANEOUS at 06:11

## 2023-11-15 RX ADMIN — ASPIRIN 81 MG CHEWABLE TABLET 81 MG: 81 TABLET CHEWABLE at 09:11

## 2023-11-15 RX ADMIN — DICLOFENAC SODIUM 2 G: 10 GEL TOPICAL at 09:11

## 2023-11-15 RX ADMIN — SPIRONOLACTONE 25 MG: 25 TABLET ORAL at 09:11
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
## 2023-11-15 NOTE — ASSESSMENT & PLAN NOTE
Known severe NICM (neg cath 2018) s/p BiV ICD  Prev intol of ACE/ARB, entresto was too pricey  Now with progressive CHF and elev creat c/w cardiorenal syndrome  Neg 3L over last 24 hrs  Dec lasix 40mg po qd  Add aldactone 25mg qd  Add toprol 25mg qd  Eventual hydrala/Imdur if BP will allow  Consider as a candidate for CardioMEMS implant

## 2023-11-15 NOTE — PROGRESS NOTES
[facility]  Cardiology  Progress Note    Patient Name: Papito Bhakta  MRN: [id number]  Admission Date: 11/10/2023  Hospital Length of Stay: 3 days  Code Status: Full Code   Attending Physician: Serenity Baker MD   Primary Care Physician: Brynn To MD  Expected Discharge Date: 11/15/2023  Principal Problem:Acute on chronic combined systolic and diastolic CHF (congestive heart failure)    Subjective:     Interval Hx: no cp/sob.  Looks weak.  NSVT noted overnight.    Tele: VPR  70s (personally reviewed and interpreted)      Past Medical History:   Diagnosis Date    RIGOBERTO (acute kidney injury) 10/7/2020    Anticoagulant long-term use     Aspirin    CHF (congestive heart failure)     Hyperlipidemia     Hypertension     NICM (nonischemic cardiomyopathy) 6/16/2020    Obesity     AMELIA (obstructive sleep apnea) 1/7/2022    Peripheral vascular disease, unspecified 2/1/2021       Past Surgical History:   Procedure Laterality Date    INSERTION OF BIVENTRICULAR IMPLANTABLE CARDIOVERTER-DEFIBRILLATOR (ICD) N/A 02/13/2019    Procedure: INSERTION, ICD, BIVENTRICULAR;  Surgeon: Shailesh Eng MD;  Location: [facility] CATH LAB;  Service: Cardiology;  Laterality: N/A;  RN PRE OP 2-6-19  1ST CASE PER  RADHA. NOTIFIED RADHA THAT ANESTHESIA IS NOT PITO FOR 1ST CASE START-LO    INSERTION OF BIVENTRICULAR IMPLANTABLE CARDIOVERTER-DEFIBRILLATOR (ICD) N/A 09/28/2020    Procedure: INSERTION, ICD, BIVENTRICULAR;  Surgeon: Jim Kwong MD;  Location: [facility] EP LAB;  Service: Cardiology;  Laterality: N/A;  NICM, CRT-D, SJM,, MAC, DM, 3 Prep*Wearing LifeVest*    oral extraction  11/2018    TESTICLE SURGERY         Review of patient's allergies indicates:   Allergen Reactions    Ramipril      Leg swelling and gynecomastia     Losartan Rash       No current facility-administered medications on file prior to encounter.     Current Outpatient Medications on File Prior to Encounter   Medication Sig    acetaminophen (TYLENOL) 500 MG  tablet Take 2 tablets (1,000 mg total) by mouth every 8 (eight) hours as needed for Pain or Temperature greater than (100.5).    amiodarone (PACERONE) 200 MG Tab Take 1 tablet (200 mg total) by mouth once daily.    aspirin (ECOTRIN) 325 MG EC tablet Take 1 tablet (325 mg total) by mouth once daily.    empagliflozin (JARDIANCE) 10 mg tablet Take 1 tablet (10 mg total) by mouth once daily.    furosemide (LASIX) 80 MG tablet TAKE 1 TABLET EVERY DAY    gabapentin (NEURONTIN) 100 MG capsule Take 1 capsule (100 mg total) by mouth 3 (three) times daily. Take 100 mg by mouth 3 (three) times daily.    hydrocortisone 1 % cream Apply topically 2 (two) times daily.    losartan (COZAAR) 25 MG tablet Take 25 mg by mouth once daily.    meloxicam (MOBIC) 15 MG tablet Take 1 tablet (15 mg total) by mouth daily as needed for Pain.    methocarbamoL (ROBAXIN) 500 MG Tab Take 2 tablets (1,000 mg total) by mouth 3 (three) times daily as needed (Pain not improved by other medications).    metoprolol succinate (TOPROL-XL) 50 MG 24 hr tablet TAKE 1 TABLET EVERY DAY    potassium chloride (K-TAB) 20 mEq TAKE 1 TABLET EVERY DAY    pravastatin (PRAVACHOL) 80 MG tablet TAKE 1 TABLET EVERY DAY    spironolactone (ALDACTONE) 25 MG tablet TAKE 1/2 TABLET (12.5 MG TOTAL) ONCE DAILY. HOLD IF SYSTOLIC BLOOD PRESSURE IS LESS THAN 110    [DISCONTINUED] ramipril (ALTACE) 10 MG capsule Take 1 capsule (10 mg total) by mouth once daily.     Family History       Problem Relation (Age of Onset)    Epilepsy Mother          Tobacco Use    Smoking status: Former     Current packs/day: 0.00     Average packs/day: 0.5 packs/day for 40.0 years (20.0 ttl pk-yrs)     Types: Cigarettes, Cigars     Start date:      Quit date:      Years since quittin.8     Passive exposure: Current    Smokeless tobacco: Never   Substance and Sexual Activity    Alcohol use: Yes     Comment: once a month beer or liquor    Drug use: No    Sexual activity: Yes     Birth  control/protection: None     Comment: uses protection sometimes     Review of Systems   Gastrointestinal:  Negative for melena.   Genitourinary:  Negative for hematuria.     Objective:     Vital Signs (Most Recent):  Temp: 97.4 °F (36.3 °C) (11/15/23 0446)  Pulse: 78 (11/15/23 0446)  Resp: 19 (11/15/23 0446)  BP: 107/79 (11/15/23 0446)  SpO2: (!) 92 % (11/15/23 0446) Vital Signs (24h Range):  Temp:  [97.4 °F (36.3 °C)-98.1 °F (36.7 °C)] 97.4 °F (36.3 °C)  Pulse:  [] 78  Resp:  [17-21] 19  SpO2:  [92 %-99 %] 92 %  BP: (107-129)/(65-83) 107/79     Weight: 110 kg (242 lb 8.1 oz)  Body mass index is 28.76 kg/m².    SpO2: (!) 92 %         Intake/Output Summary (Last 24 hours) at 11/15/2023 0746  Last data filed at 11/15/2023 0645  Gross per 24 hour   Intake 720 ml   Output 3450 ml   Net -2730 ml         Lines/Drains/Airways       Peripheral Intravenous Line  Duration                  Peripheral IV - Single Lumen 11/14/23 0740 22 G Posterior;Right Hand 1 day                   Exam unchanged vs 11/14/23  Physical Exam  Constitutional:       General: He is not in acute distress.     Appearance: He is well-developed. He is obese. He is not ill-appearing, toxic-appearing or diaphoretic.   HENT:      Head: Normocephalic and atraumatic.   Eyes:      General: No scleral icterus.     Extraocular Movements: Extraocular movements intact.      Conjunctiva/sclera: Conjunctivae normal.      Pupils: Pupils are equal, round, and reactive to light.   Neck:      Thyroid: No thyromegaly.      Vascular: JVD present.      Trachea: No tracheal deviation.   Cardiovascular:      Rate and Rhythm: Normal rate and regular rhythm.      Heart sounds: S1 normal and S2 normal. Heart sounds are distant. No murmur heard.     No friction rub. No gallop.   Pulmonary:      Effort: Pulmonary effort is normal. No respiratory distress.      Breath sounds: Normal breath sounds. No stridor. No wheezing, rhonchi or rales.   Chest:      Chest wall: No  tenderness.   Abdominal:      General: There is no distension.      Palpations: Abdomen is soft.   Musculoskeletal:         General: No tenderness. Normal range of motion.      Cervical back: Normal range of motion and neck supple. No rigidity.      Right lower leg: Edema present.      Left lower leg: Edema present.   Skin:     General: Skin is warm and dry.      Coloration: Skin is not jaundiced.   Neurological:      General: No focal deficit present.      Mental Status: He is alert and oriented to person, place, and time.      Cranial Nerves: No cranial nerve deficit.   Psychiatric:         Mood and Affect: Mood normal.         Behavior: Behavior normal.          Current Medications:   amiodarone  200 mg Oral Daily    aspirin  81 mg Oral Daily    diclofenac sodium  2 g Topical (Top) BID    furosemide (LASIX) injection  80 mg Intravenous Q12H    heparin (porcine)  5,000 Units Subcutaneous Q8H    pantoprazole  40 mg Intravenous BID    potassium chloride  40 mEq Oral Once    pravastatin  80 mg Oral Daily       acetaminophen, melatonin, ondansetron, prochlorperazine, senna-docusate 8.6-50 mg, sodium chloride 0.9%    Laboratory (all labs reviewed):  CBC:  Recent Labs   Lab 11/10/23  2146 11/12/23  0408 11/13/23  0539 11/14/23  0532 11/15/23  0457   WBC 5.15 5.17 5.88 5.73 6.77   Hemoglobin 12.9 L 12.3 L 11.8 L 12.1 L 12.3 L   Hematocrit 41.6 41.5 37.5 L 39.2 L 40.1   Platelets 116 L 130 L 128 L 132 L 131 L         CHEMISTRIES:  Recent Labs   Lab 11/20/20  0243 11/21/20  0622 03/11/21  1009 03/31/21  1808 09/15/21  0940 07/18/22  1251 08/05/22  1119 11/10/23  2146 11/11/23  0344 11/12/23  0408 11/13/23  0539 11/14/23  0532 11/14/23  1526 11/15/23  0457   Glucose 84 90 88 90 90 94   < > 90   < > 77 97 100 101 99   Sodium 142 142 140 141 137 138   < > 139   < > 137 136 139 138 139   Potassium 3.2 L 3.5 4.7 4.3 4.5 4.6   < > 4.3   < > 4.5 4.2 3.6 3.6 3.4 L   BUN 14 13 27 H 16 15 21   < > 33 H   < > 36 H 39 H 36 H 34 H 31 H    Creatinine 0.9 0.8 1.5 H 1.3 1.2 1.5 H   < > 1.9 H   < > 2.1 H 2.1 H 1.8 H 1.7 H 1.7 H   eGFR if non African American >60.0 >60.0 48 A 57.3 A >60.0 47.5 A  --   --   --   --   --   --   --   --    eGFR  --   --   --   --   --   --    < > 38 A   < > 34 A 34 A 40 A 43 A 43 A   Calcium 8.8 9.0 9.1 9.0 9.4 9.5   < > 9.8   < > 9.7 9.4 9.5 9.9 9.9   Magnesium 1.7 1.7  --   --   --   --   --  2.0  --   --   --   --  1.8 1.8    < > = values in this interval not displayed.         CARDIAC BIOMARKERS:  Recent Labs   Lab 11/10/23  2146 11/11/23  0344   Troponin I 0.014 0.028 H         COAGS:  Recent Labs   Lab 03/31/21  1808 04/02/21  1157 11/11/23  0344   INR 1.0 1.0 1.7 H         LIPIDS/LFTS:  Recent Labs   Lab 11/18/20  1058 03/31/21  1808 07/18/22  1251 10/14/22  1020 09/14/23  1625 11/10/23  2146 11/11/23  0344 11/12/23  0408 11/13/23  0539 11/14/23  0532 11/15/23  0457   Cholesterol 143  --  148  --  103 L  --   --   --   --   --   --    Triglycerides 82  --  90  --  51  --   --   --   --   --   --    HDL 49  --  51  --  35 L  --   --   --   --   --   --    LDL Cholesterol 77.6  --  79.0  --  57.8 L  --   --   --   --   --   --    Non-HDL Cholesterol 94  --  97  --  68  --   --   --   --   --   --    AST 15   < > 15   < >  --    < > 53 H 74 H 104 H 92 H 71 H   ALT 9 L   < > 9 L   < >  --    < > 47 H 61 H 84 H 85 H 75 H    < > = values in this interval not displayed.         BNP:  Recent Labs   Lab 11/18/20  1058 08/25/23  0940 11/10/23  2146    H 525 H 1,592 H         TSH:  Recent Labs   Lab 05/12/21  0929 07/18/22  1251 09/14/23  1625   TSH 3.421 1.729 2.764         Free T4:            Diagnostic Results:  ECG (personally reviewed and interpreted tracing(s)):  11/10/23 1746 AS-BiV paced 66    Chest X-Ray (personally reviewed and interpreted image(s)): 11/10/23 CHF, CMeg, R PPM 3 leads    Echo: 11/12/23 (images prev personally reviewed and interpreted)  stable findings vs report 8/18/23    Left Ventricle: The  left ventricle is severely dilated. Mildly increased wall thickness. There is mild concentric hypertrophy. Severe global hypokinesis present. There is severely reduced systolic function with a visually estimated ejection fraction of 10 -15%. Grade III diastolic dysfunction.    Right Ventricle: Severe right ventricular enlargement. Systolic function is severely reduced.    Mitral Valve: There is no stenosis. There is mild to moderate regurgitation with a centrally directed jet.    Tricuspid Valve: There is mild to moderate regurgitation.    Pulmonary Artery: The estimated pulmonary artery systolic pressure is 67 mmHg.    Cath: 2/1/18    Normal coronary arteries.     Systolic dysfunction.     Low right and left Filling Pressures.     Mild Pulmonary Hypertension.     Assessment and Plan:     * Acute on chronic combined systolic and diastolic CHF (congestive heart failure)  Known severe NICM (neg cath 2018) s/p BiV ICD  Prev intol of ACE/ARB, entresto was too pricey  Now with progressive CHF and elev creat c/w cardiorenal syndrome  Neg 3L over last 24 hrs  Dec lasix 40mg po qd  Add aldactone 25mg qd  Add toprol 25mg qd  Eventual hydrala/Imdur if BP will allow  Consider as a candidate for CardioMEMS implant    NICM (nonischemic cardiomyopathy)  As above    Acute renal failure superimposed on stage 3a chronic kidney disease  As above  Creat stable at 2.1->1.8->1.7 today    Essential hypertension  As above    Hyperlipidemia  Cont statin    Biventricular ICD (implantable cardioverter-defibrillator) in place  As above, appears to be functioning normally    Advance care planning  Palliative care consult ordered.  Appreciate [facility] care eval.          VTE Risk Mitigation (From admission, onward)           Ordered     heparin (porcine) injection 5,000 Units  Every 8 hours         11/11/23 0913     IP VTE HIGH RISK PATIENT  Once         11/10/23 2117     Place sequential compression device  Until discontinued         11/10/23 2117                   Dispo planninag appropriate.    Sridhar Hathaway MD  Cardiology  [facility]

## 2023-11-15 NOTE — ASSESSMENT & PLAN NOTE
Body mass index is 28.76 kg/m². Morbid obesity complicates all aspects of disease management from diagnostic modalities to treatment. Weight loss encouraged and health benefits explained to patient.

## 2023-11-15 NOTE — NURSING
Dr. Thomas notified patient had 13 run of Vtach,per monitor tech. Patient denies any chest pain. MD ordered Magnesium lab draw. Plan of care ongoing.

## 2023-11-15 NOTE — NURSING
Received report care assumed. Patient lying in bed resting, NAD noted. Safety precautions maintained.    [facility] [place]  Nurses Note -- 4 Eyes      11/14/2023       Skin assessed on: Q Shift      [x] No Pressure Injuries Present    [x]Prevention Measures Documented    [] Yes LDA  for Pressure Injury Previously documented     [] Yes New Pressure Injury Discovered   [] LDA for New Pressure Injury Added      Attending RN:  Yvrose Brand LPN     Second RN:  Stacey Olsen LPN

## 2023-11-15 NOTE — ASSESSMENT & PLAN NOTE
Chronic, controlled. Latest blood pressure and vitals reviewed-     Temp:  [97.4 °F (36.3 °C)-98.1 °F (36.7 °C)]   Pulse:  []   Resp:  [17-21]   BP: (107-129)/(65-83)   SpO2:  [92 %-100 %] .   Home meds for hypertension were reviewed and noted below.   Hypertension Medications               furosemide (LASIX) 80 MG tablet TAKE 1 TABLET EVERY DAY    losartan (COZAAR) 25 MG tablet Take 25 mg by mouth once daily.    metoprolol succinate (TOPROL-XL) 50 MG 24 hr tablet TAKE 1 TABLET EVERY DAY    spironolactone (ALDACTONE) 25 MG tablet TAKE 1/2 TABLET (12.5 MG TOTAL) ONCE DAILY. HOLD IF SYSTOLIC BLOOD PRESSURE IS LESS THAN 110          - holding metoprolol with concerns for normotensive cardiogenic shock  - hold losartan and spironolactone in setting of RIGOBERTO on CKD  - changed PO lasix to IV for CHF exacerbation    Will utilize p.r.n. blood pressure medication only if patient's blood pressure greater than 180/110 and he develops symptoms such as worsening chest pain or shortness of breath.

## 2023-11-15 NOTE — PLAN OF CARE
11/15/23 1118   Medicare Message   Important Message from Medicare regarding Discharge Appeal Rights Given to patient/caregiver;Explained to patient/caregiver;Other (comments)  (Explained IMM to daughter. Daughter expressed understanding. Copy emailed to [email or web address])   Date IMM was signed 11/15/23   Time IMM was signed 1116

## 2023-11-15 NOTE — SUBJECTIVE & OBJECTIVE
Past Medical History:   Diagnosis Date    RIGOBERTO (acute kidney injury) 10/7/2020    Anticoagulant long-term use     Aspirin    CHF (congestive heart failure)     Hyperlipidemia     Hypertension     NICM (nonischemic cardiomyopathy) 6/16/2020    Obesity     AMELIA (obstructive sleep apnea) 1/7/2022    Peripheral vascular disease, unspecified 2/1/2021       Past Surgical History:   Procedure Laterality Date    INSERTION OF BIVENTRICULAR IMPLANTABLE CARDIOVERTER-DEFIBRILLATOR (ICD) N/A 02/13/2019    Procedure: INSERTION, ICD, BIVENTRICULAR;  Surgeon: Shailesh Eng MD;  Location: [facility] CATH LAB;  Service: Cardiology;  Laterality: N/A;  RN PRE OP 2-6-19  1ST CASE PER  RADHA. NOTIFIED RADHA THAT ANESTHESIA IS NOT PITO FOR 1ST CASE START-LO    INSERTION OF BIVENTRICULAR IMPLANTABLE CARDIOVERTER-DEFIBRILLATOR (ICD) N/A 09/28/2020    Procedure: INSERTION, ICD, BIVENTRICULAR;  Surgeon: Jim Kwong MD;  Location: [facility] EP LAB;  Service: Cardiology;  Laterality: N/A;  NICM, CRT-D, SJM,, MAC, DM, 3 Prep*Wearing LifeVest*    oral extraction  11/2018    TESTICLE SURGERY         Review of patient's allergies indicates:   Allergen Reactions    Ramipril      Leg swelling and gynecomastia     Losartan Rash       No current facility-administered medications on file prior to encounter.     Current Outpatient Medications on File Prior to Encounter   Medication Sig    acetaminophen (TYLENOL) 500 MG tablet Take 2 tablets (1,000 mg total) by mouth every 8 (eight) hours as needed for Pain or Temperature greater than (100.5).    amiodarone (PACERONE) 200 MG Tab Take 1 tablet (200 mg total) by mouth once daily.    aspirin (ECOTRIN) 325 MG EC tablet Take 1 tablet (325 mg total) by mouth once daily.    empagliflozin (JARDIANCE) 10 mg tablet Take 1 tablet (10 mg total) by mouth once daily.    furosemide (LASIX) 80 MG tablet TAKE 1 TABLET EVERY DAY    gabapentin (NEURONTIN) 100 MG capsule Take 1 capsule (100 mg total) by mouth 3 (three) times  daily. Take 100 mg by mouth 3 (three) times daily.    hydrocortisone 1 % cream Apply topically 2 (two) times daily.    losartan (COZAAR) 25 MG tablet Take 25 mg by mouth once daily.    meloxicam (MOBIC) 15 MG tablet Take 1 tablet (15 mg total) by mouth daily as needed for Pain.    methocarbamoL (ROBAXIN) 500 MG Tab Take 2 tablets (1,000 mg total) by mouth 3 (three) times daily as needed (Pain not improved by other medications).    metoprolol succinate (TOPROL-XL) 50 MG 24 hr tablet TAKE 1 TABLET EVERY DAY    potassium chloride (K-TAB) 20 mEq TAKE 1 TABLET EVERY DAY    pravastatin (PRAVACHOL) 80 MG tablet TAKE 1 TABLET EVERY DAY    spironolactone (ALDACTONE) 25 MG tablet TAKE 1/2 TABLET (12.5 MG TOTAL) ONCE DAILY. HOLD IF SYSTOLIC BLOOD PRESSURE IS LESS THAN 110    [DISCONTINUED] ramipril (ALTACE) 10 MG capsule Take 1 capsule (10 mg total) by mouth once daily.     Family History       Problem Relation (Age of Onset)    Epilepsy Mother          Tobacco Use    Smoking status: Former     Current packs/day: 0.00     Average packs/day: 0.5 packs/day for 40.0 years (20.0 ttl pk-yrs)     Types: Cigarettes, Cigars     Start date:      Quit date:      Years since quittin.8     Passive exposure: Current    Smokeless tobacco: Never   Substance and Sexual Activity    Alcohol use: Yes     Comment: once a month beer or liquor    Drug use: No    Sexual activity: Yes     Birth control/protection: None     Comment: uses protection sometimes     Review of Systems   Gastrointestinal:  Negative for melena.   Genitourinary:  Negative for hematuria.     Objective:     Vital Signs (Most Recent):  Temp: 97.4 °F (36.3 °C) (11/15/23 0446)  Pulse: 78 (11/15/23 0446)  Resp: 19 (11/15/23 0446)  BP: 107/79 (11/15/23 0446)  SpO2: (!) 92 % (11/15/23 0446) Vital Signs (24h Range):  Temp:  [97.4 °F (36.3 °C)-98.1 °F (36.7 °C)] 97.4 °F (36.3 °C)  Pulse:  [] 78  Resp:  [17-21] 19  SpO2:  [92 %-99 %] 92 %  BP: (107-129)/(65-83) 107/79      Weight: 110 kg (242 lb 8.1 oz)  Body mass index is 28.76 kg/m².    SpO2: (!) 92 %         Intake/Output Summary (Last 24 hours) at 11/15/2023 0746  Last data filed at 11/15/2023 0645  Gross per 24 hour   Intake 720 ml   Output 3450 ml   Net -2730 ml         Lines/Drains/Airways       Peripheral Intravenous Line  Duration                  Peripheral IV - Single Lumen 11/14/23 0740 22 G Posterior;Right Hand 1 day                   Exam unchanged vs 11/14/23  Physical Exam  Constitutional:       General: He is not in acute distress.     Appearance: He is well-developed. He is obese. He is not ill-appearing, toxic-appearing or diaphoretic.   HENT:      Head: Normocephalic and atraumatic.   Eyes:      General: No scleral icterus.     Extraocular Movements: Extraocular movements intact.      Conjunctiva/sclera: Conjunctivae normal.      Pupils: Pupils are equal, round, and reactive to light.   Neck:      Thyroid: No thyromegaly.      Vascular: JVD present.      Trachea: No tracheal deviation.   Cardiovascular:      Rate and Rhythm: Normal rate and regular rhythm.      Heart sounds: S1 normal and S2 normal. Heart sounds are distant. No murmur heard.     No friction rub. No gallop.   Pulmonary:      Effort: Pulmonary effort is normal. No respiratory distress.      Breath sounds: Normal breath sounds. No stridor. No wheezing, rhonchi or rales.   Chest:      Chest wall: No tenderness.   Abdominal:      General: There is no distension.      Palpations: Abdomen is soft.   Musculoskeletal:         General: No tenderness. Normal range of motion.      Cervical back: Normal range of motion and neck supple. No rigidity.      Right lower leg: Edema present.      Left lower leg: Edema present.   Skin:     General: Skin is warm and dry.      Coloration: Skin is not jaundiced.   Neurological:      General: No focal deficit present.      Mental Status: He is alert and oriented to person, place, and time.      Cranial Nerves: No  cranial nerve deficit.   Psychiatric:         Mood and Affect: Mood normal.         Behavior: Behavior normal.          Current Medications:   amiodarone  200 mg Oral Daily    aspirin  81 mg Oral Daily    diclofenac sodium  2 g Topical (Top) BID    furosemide (LASIX) injection  80 mg Intravenous Q12H    heparin (porcine)  5,000 Units Subcutaneous Q8H    pantoprazole  40 mg Intravenous BID    potassium chloride  40 mEq Oral Once    pravastatin  80 mg Oral Daily       acetaminophen, melatonin, ondansetron, prochlorperazine, senna-docusate 8.6-50 mg, sodium chloride 0.9%    Laboratory (all labs reviewed):  CBC:  Recent Labs   Lab 11/10/23  2146 11/12/23  0408 11/13/23  0539 11/14/23  0532 11/15/23  0457   WBC 5.15 5.17 5.88 5.73 6.77   Hemoglobin 12.9 L 12.3 L 11.8 L 12.1 L 12.3 L   Hematocrit 41.6 41.5 37.5 L 39.2 L 40.1   Platelets 116 L 130 L 128 L 132 L 131 L         CHEMISTRIES:  Recent Labs   Lab 11/20/20  0243 11/21/20  0622 03/11/21  1009 03/31/21  1808 09/15/21  0940 07/18/22  1251 08/05/22  1119 11/10/23  2146 11/11/23  0344 11/12/23  0408 11/13/23  0539 11/14/23  0532 11/14/23  1526 11/15/23  0457   Glucose 84 90 88 90 90 94   < > 90   < > 77 97 100 101 99   Sodium 142 142 140 141 137 138   < > 139   < > 137 136 139 138 139   Potassium 3.2 L 3.5 4.7 4.3 4.5 4.6   < > 4.3   < > 4.5 4.2 3.6 3.6 3.4 L   BUN 14 13 27 H 16 15 21   < > 33 H   < > 36 H 39 H 36 H 34 H 31 H   Creatinine 0.9 0.8 1.5 H 1.3 1.2 1.5 H   < > 1.9 H   < > 2.1 H 2.1 H 1.8 H 1.7 H 1.7 H   eGFR if non African American >60.0 >60.0 48 A 57.3 A >60.0 47.5 A  --   --   --   --   --   --   --   --    eGFR  --   --   --   --   --   --    < > 38 A   < > 34 A 34 A 40 A 43 A 43 A   Calcium 8.8 9.0 9.1 9.0 9.4 9.5   < > 9.8   < > 9.7 9.4 9.5 9.9 9.9   Magnesium 1.7 1.7  --   --   --   --   --  2.0  --   --   --   --  1.8 1.8    < > = values in this interval not displayed.         CARDIAC BIOMARKERS:  Recent Labs   Lab 11/10/23  2146 11/11/23  0341    Troponin I 0.014 0.028 H         COAGS:  Recent Labs   Lab 03/31/21  1808 04/02/21  1157 11/11/23  0344   INR 1.0 1.0 1.7 H         LIPIDS/LFTS:  Recent Labs   Lab 11/18/20  1058 03/31/21  1808 07/18/22  1251 10/14/22  1020 09/14/23  1625 11/10/23  2146 11/11/23  0344 11/12/23  0408 11/13/23  0539 11/14/23  0532 11/15/23  0457   Cholesterol 143  --  148  --  103 L  --   --   --   --   --   --    Triglycerides 82  --  90  --  51  --   --   --   --   --   --    HDL 49  --  51  --  35 L  --   --   --   --   --   --    LDL Cholesterol 77.6  --  79.0  --  57.8 L  --   --   --   --   --   --    Non-HDL Cholesterol 94  --  97  --  68  --   --   --   --   --   --    AST 15   < > 15   < >  --    < > 53 H 74 H 104 H 92 H 71 H   ALT 9 L   < > 9 L   < >  --    < > 47 H 61 H 84 H 85 H 75 H    < > = values in this interval not displayed.         BNP:  Recent Labs   Lab 11/18/20  1058 08/25/23  0940 11/10/23  2146    H 525 H 1,592 H         TSH:  Recent Labs   Lab 05/12/21  0929 07/18/22  1251 09/14/23  1625   TSH 3.421 1.729 2.764         Free T4:            Diagnostic Results:  ECG (personally reviewed and interpreted tracing(s)):  11/10/23 1746 AS-BiV paced 66    Chest X-Ray (personally reviewed and interpreted image(s)): 11/10/23 CHF, CMeg, R PPM 3 leads    Echo: 11/12/23 (images prev personally reviewed and interpreted)  stable findings vs report 8/18/23    Left Ventricle: The left ventricle is severely dilated. Mildly increased wall thickness. There is mild concentric hypertrophy. Severe global hypokinesis present. There is severely reduced systolic function with a visually estimated ejection fraction of 10 -15%. Grade III diastolic dysfunction.    Right Ventricle: Severe right ventricular enlargement. Systolic function is severely reduced.    Mitral Valve: There is no stenosis. There is mild to moderate regurgitation with a centrally directed jet.    Tricuspid Valve: There is mild to moderate regurgitation.     Pulmonary Artery: The estimated pulmonary artery systolic pressure is 67 mmHg.    Cath: 2/1/18    Normal coronary arteries.     Systolic dysfunction.     Low right and left Filling Pressures.     Mild Pulmonary Hypertension.

## 2023-11-15 NOTE — NURSING
[facility]  Nurses Note -- 4 Eyes      11/15/2023       Skin assessed on: Q Shift      [x] No Pressure Injuries Present    [x]Prevention Measures Documented    [] Yes LDA  for Pressure Injury Previously documented     [] Yes New Pressure Injury Discovered   [] LDA for New Pressure Injury Added      Attending RN:  Michael Huffman, RN     Second RN:  Yvrose

## 2023-11-15 NOTE — NURSING
[facility] MEWS TRIGGER FOLLOW UP       MEWS Monitoring, Score is: 3  Indication for review:     Pt HR rechecked at 78.

## 2023-11-15 NOTE — PLAN OF CARE
Re-Assessment    Brynn To MD    Plan A:  Home with family and home health  Plan B:  Same    Patient to return home with his daughter and grandson.  One more day of diuresing prior to discharge.  Sitting up in chair in room with legs elevated.    Will need pcp and cardiology follow up at discharge.  His daughter will drive him home.     11/14/23 1030   Discharge Reassessment   Assessment Type Discharge Planning Reassessment   Did the patient's condition or plan change since previous assessment? Yes   Discharge Plan discussed with: Patient   Discharge Plan A Home with family   Discharge Plan B Home with family   DME Needed Upon Discharge  none   Why the patient remains in the hospital Requires continued medical care   Post-Acute Status   Post-Acute Authorization Home Health   Home Health Status Pending medical clearance/testing   Discharge Delays None known at this time

## 2023-11-15 NOTE — ASSESSMENT & PLAN NOTE
Per chart review history of CKD. Cr 1.9 on admit from baseline of 1.4 to 1.5.     Creatine worsened from baseline. BMP reviewed- noted Estimated Creatinine Clearance: 57.4 mL/min (A) (based on SCr of 1.7 mg/dL (H)). according to latest data. Based on current GFR, CKD stage is stage 3 - GFR 30-59.  Monitor UOP and serial BMP and adjust therapy as needed. Renally dose meds. Avoid nephrotoxic medications and procedures.  - this is cardiorenal syndrome  - increased lasix to 80mg IV TID on 11/12 and added metolazone 5mg daily on 11/13   -   Now transitioned back to po

## 2023-11-15 NOTE — PLAN OF CARE
SW notified patient's daughter, Annie that patient refused home health. Annie stated that she understood and it was his choice. BARB explained that if home health is needed later, patient's PCP can coordinate it.

## 2023-11-15 NOTE — PLAN OF CARE
Problem: Adult Inpatient Plan of Care  Goal: Plan of Care Review  Outcome: Adequate for Care Transition  Goal: Patient-Specific Goal (Individualized)  Outcome: Adequate for Care Transition  Goal: Absence of Hospital-Acquired Illness or Injury  Outcome: Adequate for Care Transition  Goal: Optimal Comfort and Wellbeing  Outcome: Adequate for Care Transition  Goal: Readiness for Transition of Care  Outcome: Adequate for Care Transition     Problem: Fluid and Electrolyte Imbalance (Acute Kidney Injury/Impairment)  Goal: Fluid and Electrolyte Balance  Outcome: Adequate for Care Transition     Problem: Oral Intake Inadequate (Acute Kidney Injury/Impairment)  Goal: Optimal Nutrition Intake  Outcome: Adequate for Care Transition     Problem: Renal Function Impairment (Acute Kidney Injury/Impairment)  Goal: Effective Renal Function  Outcome: Adequate for Care Transition     Problem: Coping Ineffective  Goal: Effective Coping  Outcome: Adequate for Care Transition     Problem: Fall Injury Risk  Goal: Absence of Fall and Fall-Related Injury  Outcome: Adequate for Care Transition     Problem: Nausea and Vomiting  Goal: Fluid and Electrolyte Balance  Outcome: Adequate for Care Transition     Problem: Gas Exchange Impaired  Goal: Optimal Gas Exchange  Outcome: Adequate for Care Transition     Problem: Skin Injury Risk Increased  Goal: Skin Health and Integrity  Outcome: Adequate for Care Transition

## 2023-11-15 NOTE — ASSESSMENT & PLAN NOTE
Patient was found to have thrombocytopenia  No active bleeding  With elevated TBili and elevated INR, concern for congestive hepatopathy. Liver US shows hepatomegaly, no cirrhosis   The patient's platelet results have been reviewed and are listed below.  Recent Labs   Lab 11/15/23  0457   *     - monitor CBC

## 2023-11-15 NOTE — PLAN OF CARE
Problem: Occupational Therapy  Goal: Occupational Therapy Goal  Description: Goals to be met by: 11/28/23     Patient will increase functional independence with ADLs by performing:    LE Dressing with Modified [place].  Grooming while standing at sink with Modified [place].  Toileting from toilet with Modified [place] for hygiene and clothing management.   Step transfer with Modified [place]  Toilet transfer to toilet with Modified [place].  Upper extremity exercise program x15 reps per handout, with independence.  Implementation of PLB and energy conservation strategies into daily routines w/ (I).     Outcome: Ongoing, Progressing

## 2023-11-15 NOTE — ASSESSMENT & PLAN NOTE
Presents with SOB and lower extremity edema, BNP 1200, chest x-ray with pulmonary vascular congestion. He reports he began adding salt to his food due to concern that he would lose all of his salt due to diuretic    Patient is identified as having Combined Systolic and Diastolic heart failure that is Acute on chronic. CHF is currently uncontrolled due to Continued edema of extremities, Dyspnea not returned to baseline after 1 doses of IV diuretic and Pulmonary edema/pleural effusion on CXR. Latest ECHO performed and demonstrates- Results for orders placed during the hospital encounter of 08/18/23    Echo    Interpretation Summary    Left Ventricle: The left ventricle is severely dilated. Increased ventricular mass. Normal wall thickness. Global hypokinesis present. There is severely reduced systolic function with a visually estimated ejection fraction of 15 - 20%. There is diastolic dysfunction.    Left Atrium: Left atrium is severely dilated.    Right Ventricle: Severe right ventricular enlargement. Wall thickness is normal. Systolic function is moderately reduced. Pacemaker lead present in the ventricle.    Right Atrium: Right atrium is dilated.    Mitral Valve: Mild mitral annular calcification. There is moderate regurgitation.    Tricuspid Valve: There is mild regurgitation.    Pulmonic Valve: There is mild regurgitation.    Pulmonary Artery: The estimated pulmonary artery systolic pressure is 59 mmHg.    IVC/SVC: Elevated venous pressure at 15 mmHg.  Monitor on telemetry. Patient is on CHF pathway.  Monitor strict Is&Os and daily weights.  Place on fluid restriction of 1.5 L. Cardiology has been consulted. Continue to stress to patient importance of self efficacy and  on diet for CHF. Last BNP reviewed- and noted below   Recent Labs   Lab 11/10/23  2146   BNP 1,592*       - with RIGOBERTO on CKD3a- cardiorenal syndrome  - with elevated TBili- due to hepatic congestion  - with lactic 3.5-- normotensive  cardiogenic shock  - hold BB, spironolactone. entresto was too expensive and did not tolerate ACEi/ARB. Consider bidil when improves  - increased lasix to 80mg IV TID on 11/12 and added metolazone 5mg daily on 11/13   - Cardiology following- next step would be dobutamine gtt in ICU  - Palliative consulted  -11/14: lasix dec to bid and metolazone d/c.  11/15: transitioned back to po

## 2023-11-15 NOTE — NURSING
Tele box removed and sent to tele.      IV discontinued, pressure dressing applied.  Site free of redness, pain, edema.  Tip intact

## 2023-11-15 NOTE — NURSING
Patient discharge teaching performed by virtual nurse.  Patient and daughter verbalized adequate understanding.  Given opportunity to ask questions.      Patient stated that he received flu and pneumo shot already.

## 2023-11-15 NOTE — NURSING
Patient discharged to home with daughter by wheelchair.  Chest exp full, even.  Breaths unlabored.  No pain or acute distress noted.  Stable.

## 2023-11-15 NOTE — PLAN OF CARE
Problem: Physical Therapy  Goal: Physical Therapy Goal  Description: Goals to be met by: 23     Patient will increase functional independence with mobility by performin. Supine to sit with Modified [place]  2. Sit to stand transfer with Modified [place]  3. Gait  x 75 feet with Modified [place] using RW vs Axillary crutches.   4. Lower extremity exercise program x10 reps per handout, with supervision    Outcome: Adequate for Care Transition   Goals not met, but adequate for D/C with close supervision/assist PRN.  Low Intensity therapy recommended.

## 2023-11-15 NOTE — PLAN OF CARE
No acute distress noted, patient free from falls or injury this shift. Bed in low position, wheels locked, call light in reach for assistance, plan of care continued.      Problem: Fluid and Electrolyte Imbalance (Acute Kidney Injury/Impairment)  Goal: Fluid and Electrolyte Balance  Outcome: Ongoing, Progressing     Problem: Oral Intake Inadequate (Acute Kidney Injury/Impairment)  Goal: Optimal Nutrition Intake  Outcome: Ongoing, Progressing     Problem: Renal Function Impairment (Acute Kidney Injury/Impairment)  Goal: Effective Renal Function  Outcome: Ongoing, Progressing

## 2023-11-15 NOTE — PT/OT/SLP PROGRESS
"Occupational Therapy   Treatment    Name: Papito Bhakta  MRN: [id number]  Admitting Diagnosis:  Acute on chronic combined systolic and diastolic CHF (congestive heart failure)       Recommendations:     Discharge Recommendations: Low Intensity Therapy  Discharge Equipment Recommendations:  none  Barriers to discharge:  None    Assessment:     Papito Bhakta is a 68 y.o. male with a medical diagnosis of Acute on chronic combined systolic and diastolic CHF (congestive heart failure).   Performance deficits affecting function are weakness, impaired endurance, impaired self care skills, impaired functional mobility, gait instability, impaired balance, decreased lower extremity function, decreased safety awareness, decreased upper extremity function, impaired cardiopulmonary response to activity.     Rehab Prognosis:  Good; patient would benefit from acute skilled OT services to address these deficits and reach maximum level of function.       Plan:     Patient to be seen 3 x/week to address the above listed problems via self-care/home management, therapeutic activities, therapeutic exercises  Plan of Care Expires: 11/28/23  Plan of Care Reviewed with: patient    Subjective     Chief Complaint: "Last night was odd."   Patient/Family Comments/goals: "I am supposed to be going home."   Pain/Comfort:  Location 1: ankle  Pain Addressed 1: Reposition, Distraction, Cessation of Activity    Objective:     Communicated with: Nurse prior to session.  Patient found sitting edge of bed with oxygen, telemetry upon OT entry to room.    General Precautions: Standard, fall    Orthopedic Precautions:N/A  Braces: N/A  Respiratory Status: Nasal cannula, flow 3 L/min; spO2 on RA >89-95%     Occupational Performance:     Bed Mobility:    Patient completed Scooting hips to EOB  with stand by assistance     Functional Mobility/Transfers:  Patient completed Sit <> Stand Transfer x 1 trial from EOB with contact guard assistance  with  axillary " crutches   Functional Mobility: Pt was able to ambulate functional distances w/ close CGA and use of axillary crutches; pt w/ LOB but was able to self-recover. Pt w/ L ankle pain, resulting in mild instability.     Activities of Daily Living:  Grooming: stand by assistance for cleansing hands using  dispenser   Upper Body Dressing: minimum assistance for donning gown over back and front   Toileting: supervision for using urinal while seated     AMPAC 6 Click ADL: 21    Treatment & Education:  -Pt performed ADLs and functional mobility as noted above.   -Pt educated on importance of safe OOB activity w/ staff.   -Pt educated on energy conservation strategies per handout.   -Pt educated on importance of PLB technique per handout.   -Pt educated on BUE TE per handout for increasing overall strength and ROM for safe performance w/ ADLs and functional mobility.     Patient left sitting edge of bed with all lines intact and call button in reach    GOALS:   Multidisciplinary Problems       Occupational Therapy Goals          Problem: Occupational Therapy    Goal Priority Disciplines Outcome Interventions   Occupational Therapy Goal     OT, PT/OT Ongoing, Progressing    Description: Goals to be met by: 11/28/23     Patient will increase functional independence with ADLs by performing:    LE Dressing with Modified [place].  Grooming while standing at sink with Modified [place].  Toileting from toilet with Modified [place] for hygiene and clothing management.   Step transfer with Modified [place]  Toilet transfer to toilet with Modified [place].  Upper extremity exercise program x15 reps per handout, with independence.  Implementation of PLB and energy conservation strategies into daily routines w/ (I).                          Time Tracking:     OT Date of Treatment: 11/15/23  OT Start Time: 1020  OT Stop Time: 1046  OT Total Time (min): 26 min    Billable Minutes:Self Care/Home Management  10  Therapeutic Activity 16  Total Time 26    OT/DIONICIO: OT          11/15/2023

## 2023-11-15 NOTE — PT/OT/SLP PROGRESS
Physical Therapy Treatment/Discharge summary    Patient Name:  Papito Bhakta   MRN:  [id number]    Recommendations:     Discharge Recommendations: Low Intensity Therapy (close supervision/assist PRN)  Discharge Equipment Recommendations: none  Barriers to discharge:  None from PT standpoint    Assessment:     Papito Bhakta is a 68 y.o. male admitted with a medical diagnosis of Acute on chronic combined systolic and diastolic CHF (congestive heart failure).  He presents with the following impairments/functional limitations: weakness, impaired self care skills, impaired balance, decreased coordination, decreased safety awareness, impaired endurance, impaired functional mobility, decreased upper extremity function, impaired coordination, edema, gait instability Goals not met, but adequate for D/C with close supervision/assist PRN recommended.    Rehab Prognosis: Good; patient would benefit from Post acute skilled PT services to address these deficits and reach maximum level of function.    Recent Surgery: * No surgery found *      Plan:     During this hospitalization, patient to be seen  (N/A, pt to be D/C'd home today) to address the identified rehab impairments via  (N/A, pt to be D/C'd home today) and progress toward the following goals:    Plan of Care Expires:  11/15/23    Subjective     Chief Complaint: No c/o  Patient/Family Comments/goals: Pt declined gait training, stating that he already walked in the hallway with OT  Pain/Comfort:  Pain Rating 1: 0/10      Objective:     Communicated with nsg prior to session.  Patient found sitting edge of bed with oxygen, telemetry upon PT entry to room.     General Precautions: Standard, fall  Orthopedic Precautions: N/A  Braces: N/A  Respiratory Status: Room air     Functional Mobility:  Balance: Fair+ sit,       AM-PAC 6 CLICK MOBILITY  Turning over in bed (including adjusting bedclothes, sheets and blankets)?: 3  Sitting down on and standing up from a chair with arms  (e.g., wheelchair, bedside commode, etc.): 3  Moving from lying on back to sitting on the side of the bed?: 3  Moving to and from a bed to a chair (including a wheelchair)?: 3  Need to walk in hospital room?: 3  Climbing 3-5 steps with a railing?: 3  Basic Mobility Total Score: 18       Treatment & Education:  Handout reviewed for B AP's, TKE's, GS, and Hip ABd in supine and B AP's, FAq's, marching, Hip ADD squeeze, Hip ABd/Add in sitting to be performed 10x each, 2-3x/day.  Educated pt to perform Scap and cervical retractions as well.       Patient left sitting edge of bed with call button in reach..    GOALS:   Multidisciplinary Problems       Physical Therapy Goals          Problem: Physical Therapy    Goal Priority Disciplines Outcome Goal Variances Interventions   Physical Therapy Goal     PT, PT/OT Adequate for Care Transition     Description: Goals to be met by: 23     Patient will increase functional independence with mobility by performin. Supine to sit with Modified [place]  2. Sit to stand transfer with Modified [place]  3. Gait  x 75 feet with Modified [place] using RW vs Axillary crutches.   4. Lower extremity exercise program x10 reps per handout, with supervision                         Time Tracking:     PT Received On: 11/15/23  PT Start Time: 1136     PT Stop Time: 1147  PT Total Time (min): 11 min     Billable Minutes: Therapeutic Exercise 11    Treatment Type: Treatment  PT/PTA: PT     Number of PTA visits since last PT visit: 0     11/15/2023

## 2023-11-15 NOTE — NURSING
AVS virtually reviewed with patient and his daughter in its entirety with emphasis on medications, follow-up appointments and reasons to return to the ED or contact the [facility] On Call Nurse Care Line. Patient also encouraged to utilize their patient portal. Ease and convenience of use reiterated. Education complete and patient voiced understanding. All questions answered. Discharge teaching complete.

## 2023-11-15 NOTE — PLAN OF CARE
SW notified nurse that patient is ready for discharge from case management standpoint.        11/15/23 1159   Final Note   Assessment Type Final Discharge Note   Anticipated Discharge Disposition Home   What phone number can be called within the next 1-3 days to see how you are doing after discharge? [phone]   Hospital Resources/Appts/Education Provided Appointments scheduled and added to AVS   Post-Acute Status   Post-Acute Authorization Other   Other Status No Post-Acute Service Needs   Discharge Delays None known at this time

## 2023-11-17 ENCOUNTER — PATIENT OUTREACH (OUTPATIENT)
Dept: ADMINISTRATIVE | Facility: CLINIC | Age: 68
End: 2023-11-17
Payer: MEDICARE

## 2023-11-17 NOTE — PROGRESS NOTES
C3 nurse spoke with Papito Bhakta  for a TCC post hospital discharge follow up call. The patient has a scheduled HOSFU appointment with Brynn To MD on 11/20/2023 @ 2198.    Message sent to PCP staff.

## 2023-11-29 ENCOUNTER — PATIENT MESSAGE (OUTPATIENT)
Dept: FAMILY MEDICINE | Facility: CLINIC | Age: 68
End: 2023-11-29

## 2023-11-29 ENCOUNTER — OFFICE VISIT (OUTPATIENT)
Dept: FAMILY MEDICINE | Facility: CLINIC | Age: 68
End: 2023-11-29
Payer: MEDICARE

## 2023-11-29 ENCOUNTER — LAB VISIT (OUTPATIENT)
Dept: LAB | Facility: HOSPITAL | Age: 68
End: 2023-11-29
Payer: MEDICARE

## 2023-11-29 VITALS
OXYGEN SATURATION: 99 % | SYSTOLIC BLOOD PRESSURE: 90 MMHG | HEART RATE: 62 BPM | WEIGHT: 245.13 LBS | DIASTOLIC BLOOD PRESSURE: 58 MMHG | TEMPERATURE: 99 F | BODY MASS INDEX: 28.94 KG/M2 | RESPIRATION RATE: 16 BRPM | HEIGHT: 77 IN

## 2023-11-29 DIAGNOSIS — R74.8 ELEVATED LIVER ENZYMES: ICD-10-CM

## 2023-11-29 DIAGNOSIS — G89.29 CHRONIC PAIN OF BOTH KNEES: ICD-10-CM

## 2023-11-29 DIAGNOSIS — S81.801A UNSPECIFIED OPEN WOUND, RIGHT LOWER LEG, INITIAL ENCOUNTER: ICD-10-CM

## 2023-11-29 DIAGNOSIS — M25.561 CHRONIC PAIN OF BOTH KNEES: ICD-10-CM

## 2023-11-29 DIAGNOSIS — M25.562 CHRONIC PAIN OF BOTH KNEES: ICD-10-CM

## 2023-11-29 DIAGNOSIS — I50.42 CHRONIC COMBINED SYSTOLIC AND DIASTOLIC CONGESTIVE HEART FAILURE: ICD-10-CM

## 2023-11-29 DIAGNOSIS — I50.42 CHRONIC COMBINED SYSTOLIC AND DIASTOLIC CONGESTIVE HEART FAILURE: Primary | ICD-10-CM

## 2023-11-29 LAB
ALBUMIN SERPL BCP-MCNC: 2.9 G/DL (ref 3.5–5.2)
ALBUMIN SERPL BCP-MCNC: 2.9 G/DL (ref 3.5–5.2)
ALP SERPL-CCNC: 94 U/L (ref 55–135)
ALP SERPL-CCNC: 94 U/L (ref 55–135)
ALT SERPL W/O P-5'-P-CCNC: 20 U/L (ref 10–44)
ALT SERPL W/O P-5'-P-CCNC: 20 U/L (ref 10–44)
ANION GAP SERPL CALC-SCNC: 13 MMOL/L (ref 8–16)
AST SERPL-CCNC: 24 U/L (ref 10–40)
AST SERPL-CCNC: 24 U/L (ref 10–40)
BILIRUB DIRECT SERPL-MCNC: 3.5 MG/DL (ref 0.1–0.3)
BILIRUB SERPL-MCNC: 5.4 MG/DL (ref 0.1–1)
BILIRUB SERPL-MCNC: 5.4 MG/DL (ref 0.1–1)
BUN SERPL-MCNC: 32 MG/DL (ref 8–23)
CALCIUM SERPL-MCNC: 9.8 MG/DL (ref 8.7–10.5)
CHLORIDE SERPL-SCNC: 88 MMOL/L (ref 95–110)
CO2 SERPL-SCNC: 33 MMOL/L (ref 23–29)
CREAT SERPL-MCNC: 1.6 MG/DL (ref 0.5–1.4)
EST. GFR  (NO RACE VARIABLE): 47 ML/MIN/1.73 M^2
GLUCOSE SERPL-MCNC: 88 MG/DL (ref 70–110)
POTASSIUM SERPL-SCNC: 3.3 MMOL/L (ref 3.5–5.1)
PROT SERPL-MCNC: 7.5 G/DL (ref 6–8.4)
PROT SERPL-MCNC: 7.5 G/DL (ref 6–8.4)
SODIUM SERPL-SCNC: 134 MMOL/L (ref 136–145)

## 2023-11-29 PROCEDURE — 99214 PR OFFICE/OUTPT VISIT, EST, LEVL IV, 30-39 MIN: ICD-10-PCS | Mod: S$GLB,,, | Performed by: NURSE PRACTITIONER

## 2023-11-29 PROCEDURE — 3044F HG A1C LEVEL LT 7.0%: CPT | Mod: CPTII,S$GLB,, | Performed by: NURSE PRACTITIONER

## 2023-11-29 PROCEDURE — 3074F PR MOST RECENT SYSTOLIC BLOOD PRESSURE < 130 MM HG: ICD-10-PCS | Mod: CPTII,S$GLB,, | Performed by: NURSE PRACTITIONER

## 2023-11-29 PROCEDURE — 1159F PR MEDICATION LIST DOCUMENTED IN MEDICAL RECORD: ICD-10-PCS | Mod: CPTII,S$GLB,, | Performed by: NURSE PRACTITIONER

## 2023-11-29 PROCEDURE — 1160F PR REVIEW ALL MEDS BY PRESCRIBER/CLIN PHARMACIST DOCUMENTED: ICD-10-PCS | Mod: CPTII,S$GLB,, | Performed by: NURSE PRACTITIONER

## 2023-11-29 PROCEDURE — 1101F PT FALLS ASSESS-DOCD LE1/YR: CPT | Mod: CPTII,S$GLB,, | Performed by: NURSE PRACTITIONER

## 2023-11-29 PROCEDURE — 1125F PR PAIN SEVERITY QUANTIFIED, PAIN PRESENT: ICD-10-PCS | Mod: CPTII,S$GLB,, | Performed by: NURSE PRACTITIONER

## 2023-11-29 PROCEDURE — 1160F RVW MEDS BY RX/DR IN RCRD: CPT | Mod: CPTII,S$GLB,, | Performed by: NURSE PRACTITIONER

## 2023-11-29 PROCEDURE — 3288F FALL RISK ASSESSMENT DOCD: CPT | Mod: CPTII,S$GLB,, | Performed by: NURSE PRACTITIONER

## 2023-11-29 PROCEDURE — 80053 COMPREHEN METABOLIC PANEL: CPT | Performed by: NURSE PRACTITIONER

## 2023-11-29 PROCEDURE — 3008F BODY MASS INDEX DOCD: CPT | Mod: CPTII,S$GLB,, | Performed by: NURSE PRACTITIONER

## 2023-11-29 PROCEDURE — 3078F PR MOST RECENT DIASTOLIC BLOOD PRESSURE < 80 MM HG: ICD-10-PCS | Mod: CPTII,S$GLB,, | Performed by: NURSE PRACTITIONER

## 2023-11-29 PROCEDURE — 1111F DSCHRG MED/CURRENT MED MERGE: CPT | Mod: CPTII,S$GLB,, | Performed by: NURSE PRACTITIONER

## 2023-11-29 PROCEDURE — 3044F PR MOST RECENT HEMOGLOBIN A1C LEVEL <7.0%: ICD-10-PCS | Mod: CPTII,S$GLB,, | Performed by: NURSE PRACTITIONER

## 2023-11-29 PROCEDURE — 99214 OFFICE O/P EST MOD 30 MIN: CPT | Mod: S$GLB,,, | Performed by: NURSE PRACTITIONER

## 2023-11-29 PROCEDURE — 4010F ACE/ARB THERAPY RXD/TAKEN: CPT | Mod: CPTII,S$GLB,, | Performed by: NURSE PRACTITIONER

## 2023-11-29 PROCEDURE — 4010F PR ACE/ARB THEARPY RXD/TAKEN: ICD-10-PCS | Mod: CPTII,S$GLB,, | Performed by: NURSE PRACTITIONER

## 2023-11-29 PROCEDURE — 99999 PR PBB SHADOW E&M-EST. PATIENT-LVL V: ICD-10-PCS | Mod: PBBFAC,,, | Performed by: NURSE PRACTITIONER

## 2023-11-29 PROCEDURE — 1125F AMNT PAIN NOTED PAIN PRSNT: CPT | Mod: CPTII,S$GLB,, | Performed by: NURSE PRACTITIONER

## 2023-11-29 PROCEDURE — 1111F PR DISCHARGE MEDS RECONCILED W/ CURRENT OUTPATIENT MED LIST: ICD-10-PCS | Mod: CPTII,S$GLB,, | Performed by: NURSE PRACTITIONER

## 2023-11-29 PROCEDURE — 36415 COLL VENOUS BLD VENIPUNCTURE: CPT | Mod: PN | Performed by: NURSE PRACTITIONER

## 2023-11-29 PROCEDURE — 1159F MED LIST DOCD IN RCRD: CPT | Mod: CPTII,S$GLB,, | Performed by: NURSE PRACTITIONER

## 2023-11-29 PROCEDURE — 99999 PR PBB SHADOW E&M-EST. PATIENT-LVL V: CPT | Mod: PBBFAC,,, | Performed by: NURSE PRACTITIONER

## 2023-11-29 PROCEDURE — 3288F PR FALLS RISK ASSESSMENT DOCUMENTED: ICD-10-PCS | Mod: CPTII,S$GLB,, | Performed by: NURSE PRACTITIONER

## 2023-11-29 PROCEDURE — 3008F PR BODY MASS INDEX (BMI) DOCUMENTED: ICD-10-PCS | Mod: CPTII,S$GLB,, | Performed by: NURSE PRACTITIONER

## 2023-11-29 PROCEDURE — 3078F DIAST BP <80 MM HG: CPT | Mod: CPTII,S$GLB,, | Performed by: NURSE PRACTITIONER

## 2023-11-29 PROCEDURE — 1101F PR PT FALLS ASSESS DOC 0-1 FALLS W/OUT INJ PAST YR: ICD-10-PCS | Mod: CPTII,S$GLB,, | Performed by: NURSE PRACTITIONER

## 2023-11-29 PROCEDURE — 3074F SYST BP LT 130 MM HG: CPT | Mod: CPTII,S$GLB,, | Performed by: NURSE PRACTITIONER

## 2023-11-29 NOTE — PATIENT INSTRUCTIONS
Stop spironolactone for now    Please message with blood pressure readings over the next couple days

## 2023-11-29 NOTE — PROGRESS NOTES
Routine Office Visit    Patient Name: Papito Bhakta    : 1955  MRN: [id number]    Chief Complaint:  Hospital follow-up    Subjective:  Papito is a 68 y.o. male who presents today for a hospital follow-up.  Discharge summary is as follows in bold:    [facility]  Discharge Summary        Patient Name: Papito Bhakta  MRN: [id number]  GLORIA: [id number]  Patient Class: IP- Inpatient  Admission Date: 11/10/2023  Hospital Length of Stay: 3 days  Discharge Date and Time:  11/15/2023 11:49 AM  Attending Physician: Serenity Baker MD   Discharging Provider: Serenity Baker MD  Primary Care Provider: Brynn To MD     Primary Care Team: Networked reference to record PCT      HPI:   Papito Bhakta 68 y.o. male with HTN, HLD, CHF S/P AICD, on amiodarone for VT prevention since the hospital multiple complaints including shortness of breath lower extremity edema and painless nausea and vomiting.  Regarding his shortness breath lower extremity edema in the symptoms have been progressive for the last week.  He reports compliance with his home Lasix of 80 mg.  He reports he began to add salt to his food as he was concerned that he would lose all of his salt due to diuretic use.  He is also had painless nausea and vomiting that occur as occurred intermittently for the last 2 weeks.  Reports a grandson had similar symptoms.  There has been no pain with vomiting.  There has been no blood in the emesis.  He is not currently nauseated.     In the ED, afebrile without leukocytosis chest x-ray with cardiomegaly mild pulmonary vascular congestion troponin negative BNP 1270 creatinine 1.6 point of care bilirubin 4.1.     * No surgery found *       Hospital Course:   Mr Papito Bhakta was placed in observation with acute on chronic systolic/diastolic CHF associated with cardiorenal syndrome/RIGOBERTO on CKD and congestive hepatopathy/elevated Tbili. Cause is increased dietary salt intake. Started IV  lasix. Also with nausea, vomiting, with no abdominal pain/fever/diarrhea/hematemesis. Started PPI IV BID and antiemetics with improvement. Not diuresing well and renal/liver function worsening. Lactic up to 3.5, signifying developing cardiogenic shock. Cardiology following, increased diuretics. Renal function stable, Tbili downtrending.   11/14: dec Lasix iv to bid and d/c metolazone.   11/15: Now transitioned to Po lasix. Cards ok'd to d/c. Pt declines SNF or HH. Deemed stable to be d/c.       Patient reports today with his daughter Annie for a hospital follow-up.  Since being discharged from the hospital he reports no chest pain or shortness of breath.  Reports that he is feeling better with restarting his medications.  He is compliant with his Lasix, Jardiance, and spironolactone.  His weight when he went to the hospital was 275, now 245.    He would like a referral to orthopedics for chronic knee pain presumably due to arthritis.    He did develop a wound to the medial and lateral right ankle and would like to get this checked out.  He states this started when it was in the hospital.    He states that his eyes have become yellow since being discharged.  He does not report any abdominal pain.  Denies any nausea or vomiting.  Reviewed liver workup from hospital records.  Abdominal ultrasound then showed hepatomegaly.  Negative for hep panel.  Bilirubin was in the 5s.    Past Medical History  Past Medical History:   Diagnosis Date    RIGOBERTO (acute kidney injury) 10/7/2020    Anticoagulant long-term use     Aspirin    CHF (congestive heart failure)     Hyperlipidemia     Hypertension     NICM (nonischemic cardiomyopathy) 6/16/2020    Obesity     AMELIA (obstructive sleep apnea) 1/7/2022    Peripheral vascular disease, unspecified 2/1/2021       Family History  Family History   Problem Relation Age of Onset    Epilepsy Mother        Current Medications  Current Outpatient Medications on File Prior to Visit   Medication Sig  Dispense Refill    acetaminophen (TYLENOL) 500 MG tablet Take 2 tablets (1,000 mg total) by mouth every 8 (eight) hours as needed for Pain or Temperature greater than (100.5). 30 tablet 0    amiodarone (PACERONE) 200 MG Tab Take 1 tablet (200 mg total) by mouth once daily. 90 tablet 3    aspirin (ECOTRIN) 325 MG EC tablet Take 1 tablet (325 mg total) by mouth once daily. 90 tablet 3    empagliflozin (JARDIANCE) 10 mg tablet Take 1 tablet (10 mg total) by mouth once daily. 90 tablet 3    furosemide (LASIX) 80 MG tablet TAKE 1 TABLET EVERY DAY 90 tablet 1    hydrocortisone 1 % cream Apply topically 2 (two) times daily. 30 g 0    methocarbamoL (ROBAXIN) 500 MG Tab Take 2 tablets (1,000 mg total) by mouth 3 (three) times daily as needed (Pain not improved by other medications). 30 tablet 0    metoprolol succinate (TOPROL-XL) 50 MG 24 hr tablet TAKE 1 TABLET EVERY DAY 90 tablet 3    potassium chloride (K-TAB) 20 mEq TAKE 1 TABLET EVERY DAY 90 tablet 1    pravastatin (PRAVACHOL) 80 MG tablet TAKE 1 TABLET EVERY DAY 90 tablet 1    spironolactone (ALDACTONE) 25 MG tablet TAKE 1/2 TABLET (12.5 MG TOTAL) ONCE DAILY. HOLD IF SYSTOLIC BLOOD PRESSURE IS LESS THAN 110 45 tablet 5    gabapentin (NEURONTIN) 100 MG capsule Take 1 capsule (100 mg total) by mouth 3 (three) times daily. Take 100 mg by mouth 3 (three) times daily. 270 capsule 1    [DISCONTINUED] losartan (COZAAR) 25 MG tablet Take 25 mg by mouth once daily.      [DISCONTINUED] ramipril (ALTACE) 10 MG capsule Take 1 capsule (10 mg total) by mouth once daily. 90 capsule 3     No current facility-administered medications on file prior to visit.       Allergies   Review of patient's allergies indicates:   Allergen Reactions    Ramipril      Leg swelling and gynecomastia     Losartan Rash       Review of Systems (Pertinent positives)  Review of Systems   Constitutional:  Negative for chills and fever.   HENT: Negative.     Eyes: Negative.    Respiratory: Negative.    "  Cardiovascular:  Positive for leg swelling. Negative for chest pain, palpitations and orthopnea.   Gastrointestinal: Negative.    Genitourinary: Negative.    Musculoskeletal:  Positive for joint pain.   Skin:  Negative for itching and rash.   Neurological: Negative.    Endo/Heme/Allergies: Negative.    Psychiatric/Behavioral: Negative.         BP (!) 90/58 (BP Location: Left arm, Patient Position: Sitting, BP Method: Large (Manual))   Pulse 62   Temp 98.5 °F (36.9 °C) (Oral)   Resp 16   Ht 6' 5" (1.956 m)   Wt 111.2 kg (245 lb 2.4 oz)   SpO2 99%   BMI 29.07 kg/m²     Physical Exam  Vitals reviewed.   Constitutional:       General: He is not in acute distress.     Appearance: Normal appearance. He is not ill-appearing, toxic-appearing or diaphoretic.   HENT:      Head: Normocephalic and atraumatic.   Eyes:      General: Scleral icterus present.   Cardiovascular:      Rate and Rhythm: Normal rate and regular rhythm.      Pulses: Normal pulses.      Heart sounds: Normal heart sounds.      Comments: Mild trace lower extremity edema bilaterally  Pulmonary:      Effort: Pulmonary effort is normal. No respiratory distress.      Breath sounds: Normal breath sounds. No wheezing.   Abdominal:      General: Bowel sounds are normal. There is no distension.      Palpations: Abdomen is soft.      Tenderness: There is no abdominal tenderness.   Musculoskeletal:         General: Swelling present. No tenderness or deformity. Normal range of motion.   Skin:     General: Skin is warm and dry.      Capillary Refill: Capillary refill takes less than 2 seconds.      Comments: Pale open wounds noted to right medial and lateral ankle    Dry skin noted bilateral legs.  Some medial ankle hyperpigmentation   Neurological:      General: No focal deficit present.      Mental Status: He is alert and oriented to person, place, and time.   Psychiatric:         Mood and Affect: Mood normal.         Behavior: Behavior normal.        The " patient was assured that any pictures taken during this encounter are confidential parts of the medical record. Verbal consent was obtained from the patient to take pictures of the affected area for documentation.      L leg:      R lateral ankle:      R medial ankle:      Assessment/Plan:  Papito Bhakta is a 68 y.o. male who presents today for :    Papito was seen today for hospital follow up, wound check and referral.    Diagnoses and all orders for this visit:    Chronic combined systolic and diastolic congestive heart failure  -     COMPREHENSIVE METABOLIC PANEL; Future    Chronic pain of both knees  -     Ambulatory referral/consult to Orthopedics; Future    Unspecified open wound, right lower leg, initial encounter  -     Ambulatory referral/consult to Wound Clinic; Future    Elevated liver enzymes  -     HEPATIC FUNCTION PANEL; Future    I had a lengthy and detailed discussion with the patient regarding his symptoms and further workup going forward.      Will consult Wound Clinic for leg wounds.      Referral to orthopedics for chronic knee pain.  Presumably due to arthritis.      His BP is a bit low today.  90/58.  Recommended patient to hold spironolactone for now.  Continue Jardiance and Lasix for now.  Will recheck metabolic and hepatic function panel.  May need to consider referral to hepatology and Nephrology if needed.    I have asked the patient's daughter to monitor his blood pressure at home.  She will do this and message me with some readings over the next few days.  Can consider inpatient follow-up next week if needed.    Patient will need to follow up with his cardiologist.    All questions answered.        This office note has been dictated.  This dictation has been generated using M-Modal Fluency Direct dictation; some phonetic errors may occur.

## 2023-12-04 DIAGNOSIS — I50.42 CHRONIC COMBINED SYSTOLIC AND DIASTOLIC CONGESTIVE HEART FAILURE: Primary | ICD-10-CM

## 2023-12-04 DIAGNOSIS — K76.9 HEPATOPATHY: ICD-10-CM

## 2023-12-06 ENCOUNTER — HOSPITAL ENCOUNTER (EMERGENCY)
Facility: HOSPITAL | Age: 68
Discharge: HOME | End: 2023-12-06
Attending: STUDENT IN AN ORGANIZED HEALTH CARE EDUCATION/TRAINING PROGRAM
Payer: MEDICARE

## 2023-12-06 VITALS
BODY MASS INDEX: 28.69 KG/M2 | TEMPERATURE: 98 F | OXYGEN SATURATION: 99 % | RESPIRATION RATE: 16 BRPM | DIASTOLIC BLOOD PRESSURE: 64 MMHG | HEIGHT: 77 IN | SYSTOLIC BLOOD PRESSURE: 103 MMHG | WEIGHT: 243 LBS | HEART RATE: 60 BPM

## 2023-12-06 DIAGNOSIS — M79.89 LEG SWELLING: ICD-10-CM

## 2023-12-06 DIAGNOSIS — M25.569 KNEE PAIN, UNSPECIFIED CHRONICITY, UNSPECIFIED LATERALITY: Primary | ICD-10-CM

## 2023-12-06 DIAGNOSIS — I95.9 HYPOTENSION: ICD-10-CM

## 2023-12-06 DIAGNOSIS — M79.604 RIGHT LEG PAIN: Primary | ICD-10-CM

## 2023-12-06 LAB
ALBUMIN SERPL BCP-MCNC: 3 G/DL (ref 3.5–5.2)
ALLENS TEST: ABNORMAL
ALLENS TEST: ABNORMAL
ALLENS TEST: NORMAL
ALP SERPL-CCNC: 112 U/L (ref 55–135)
ALT SERPL W/O P-5'-P-CCNC: 16 U/L (ref 10–44)
ANION GAP SERPL CALC-SCNC: 16 MMOL/L (ref 8–16)
APTT PPP: 29.1 SEC (ref 21–32)
AST SERPL-CCNC: 26 U/L (ref 10–40)
BACTERIA #/AREA URNS AUTO: ABNORMAL /HPF
BASOPHILS # BLD AUTO: 0.05 K/UL (ref 0–0.2)
BASOPHILS NFR BLD: 1.1 % (ref 0–1.9)
BILIRUB SERPL-MCNC: 4 MG/DL (ref 0.1–1)
BILIRUB UR QL STRIP: NEGATIVE
BNP SERPL-MCNC: 1258 PG/ML (ref 0–99)
BUN SERPL-MCNC: 39 MG/DL (ref 8–23)
CALCIUM SERPL-MCNC: 9.7 MG/DL (ref 8.7–10.5)
CHLORIDE SERPL-SCNC: 94 MMOL/L (ref 95–110)
CLARITY UR REFRACT.AUTO: ABNORMAL
CO2 SERPL-SCNC: 26 MMOL/L (ref 23–29)
COLOR UR AUTO: YELLOW
CREAT SERPL-MCNC: 2 MG/DL (ref 0.5–1.4)
DIFFERENTIAL METHOD: ABNORMAL
EOSINOPHIL # BLD AUTO: 0.1 K/UL (ref 0–0.5)
EOSINOPHIL NFR BLD: 2.7 % (ref 0–8)
ERYTHROCYTE [DISTWIDTH] IN BLOOD BY AUTOMATED COUNT: 22.2 % (ref 11.5–14.5)
EST. GFR  (NO RACE VARIABLE): 35.7 ML/MIN/1.73 M^2
GLUCOSE SERPL-MCNC: 91 MG/DL (ref 70–110)
GLUCOSE UR QL STRIP: ABNORMAL
HCO3 UR-SCNC: 37.5 MMOL/L (ref 24–28)
HCT VFR BLD AUTO: 41.8 % (ref 40–54)
HCT VFR BLD CALC: 46 %PCV (ref 36–54)
HGB BLD-MCNC: 13.2 G/DL (ref 14–18)
HGB UR QL STRIP: NEGATIVE
HYALINE CASTS UR QL AUTO: 0 /LPF
IMM GRANULOCYTES # BLD AUTO: 0.01 K/UL (ref 0–0.04)
IMM GRANULOCYTES NFR BLD AUTO: 0.2 % (ref 0–0.5)
INR PPP: 1.2 (ref 0.8–1.2)
KETONES UR QL STRIP: NEGATIVE
LDH SERPL L TO P-CCNC: 1.71 MMOL/L (ref 0.5–2.2)
LDH SERPL L TO P-CCNC: 2.4 MMOL/L (ref 0.5–2.2)
LEUKOCYTE ESTERASE UR QL STRIP: NEGATIVE
LIPASE SERPL-CCNC: 23 U/L (ref 4–60)
LYMPHOCYTES # BLD AUTO: 1.4 K/UL (ref 1–4.8)
LYMPHOCYTES NFR BLD: 31.4 % (ref 18–48)
MAGNESIUM SERPL-MCNC: 2.6 MG/DL (ref 1.6–2.6)
MCH RBC QN AUTO: 23.1 PG (ref 27–31)
MCHC RBC AUTO-ENTMCNC: 31.6 G/DL (ref 32–36)
MCV RBC AUTO: 73 FL (ref 82–98)
MICROSCOPIC COMMENT: ABNORMAL
MONOCYTES # BLD AUTO: 0.5 K/UL (ref 0.3–1)
MONOCYTES NFR BLD: 10.9 % (ref 4–15)
NEUTROPHILS # BLD AUTO: 2.4 K/UL (ref 1.8–7.7)
NEUTROPHILS NFR BLD: 53.7 % (ref 38–73)
NITRITE UR QL STRIP: NEGATIVE
NRBC BLD-RTO: 0 /100 WBC
PCO2 BLDA: 62.2 MMHG (ref 35–45)
PH SMN: 7.39 [PH] (ref 7.35–7.45)
PH UR STRIP: 6 [PH] (ref 5–8)
PHOSPHATE SERPL-MCNC: 3.5 MG/DL (ref 2.7–4.5)
PLATELET # BLD AUTO: 175 K/UL (ref 150–450)
PMV BLD AUTO: 11.5 FL (ref 9.2–12.9)
PO2 BLDA: 16 MMHG (ref 40–60)
POC BE: 12 MMOL/L
POC SATURATED O2: 20 % (ref 95–100)
POC TCO2: 39 MMOL/L (ref 24–29)
POTASSIUM SERPL-SCNC: 4.1 MMOL/L (ref 3.5–5.1)
PROT SERPL-MCNC: 8.1 G/DL (ref 6–8.4)
PROT UR QL STRIP: ABNORMAL
PROTHROMBIN TIME: 12.4 SEC (ref 9–12.5)
RBC # BLD AUTO: 5.71 M/UL (ref 4.6–6.2)
RBC #/AREA URNS AUTO: 0 /HPF (ref 0–4)
SAMPLE: ABNORMAL
SAMPLE: ABNORMAL
SAMPLE: NORMAL
SITE: ABNORMAL
SITE: ABNORMAL
SITE: NORMAL
SODIUM SERPL-SCNC: 136 MMOL/L (ref 136–145)
SP GR UR STRIP: 1.02 (ref 1–1.03)
SQUAMOUS #/AREA URNS AUTO: 1 /HPF
TROPONIN I SERPL DL<=0.01 NG/ML-MCNC: 0.02 NG/ML (ref 0–0.03)
URN SPEC COLLECT METH UR: ABNORMAL
WBC # BLD AUTO: 4.39 K/UL (ref 3.9–12.7)
WBC #/AREA URNS AUTO: 6 /HPF (ref 0–5)
YEAST UR QL AUTO: ABNORMAL

## 2023-12-06 PROCEDURE — 25000003 PHARM REV CODE 250: Performed by: STUDENT IN AN ORGANIZED HEALTH CARE EDUCATION/TRAINING PROGRAM

## 2023-12-06 PROCEDURE — 82803 BLOOD GASES ANY COMBINATION: CPT

## 2023-12-06 PROCEDURE — 93010 EKG 12-LEAD: ICD-10-PCS | Mod: ,,, | Performed by: INTERNAL MEDICINE

## 2023-12-06 PROCEDURE — 85610 PROTHROMBIN TIME: CPT | Performed by: STUDENT IN AN ORGANIZED HEALTH CARE EDUCATION/TRAINING PROGRAM

## 2023-12-06 PROCEDURE — 83735 ASSAY OF MAGNESIUM: CPT | Performed by: STUDENT IN AN ORGANIZED HEALTH CARE EDUCATION/TRAINING PROGRAM

## 2023-12-06 PROCEDURE — 87040 BLOOD CULTURE FOR BACTERIA: CPT | Mod: 91 | Performed by: STUDENT IN AN ORGANIZED HEALTH CARE EDUCATION/TRAINING PROGRAM

## 2023-12-06 PROCEDURE — 93005 ELECTROCARDIOGRAM TRACING: CPT

## 2023-12-06 PROCEDURE — 83690 ASSAY OF LIPASE: CPT | Performed by: STUDENT IN AN ORGANIZED HEALTH CARE EDUCATION/TRAINING PROGRAM

## 2023-12-06 PROCEDURE — 99900035 HC TECH TIME PER 15 MIN (STAT)

## 2023-12-06 PROCEDURE — 81001 URINALYSIS AUTO W/SCOPE: CPT | Performed by: STUDENT IN AN ORGANIZED HEALTH CARE EDUCATION/TRAINING PROGRAM

## 2023-12-06 PROCEDURE — 85730 THROMBOPLASTIN TIME PARTIAL: CPT | Performed by: STUDENT IN AN ORGANIZED HEALTH CARE EDUCATION/TRAINING PROGRAM

## 2023-12-06 PROCEDURE — 99285 EMERGENCY DEPT VISIT HI MDM: CPT | Mod: 25

## 2023-12-06 PROCEDURE — 83605 ASSAY OF LACTIC ACID: CPT | Mod: 91

## 2023-12-06 PROCEDURE — 80053 COMPREHEN METABOLIC PANEL: CPT | Performed by: STUDENT IN AN ORGANIZED HEALTH CARE EDUCATION/TRAINING PROGRAM

## 2023-12-06 PROCEDURE — 83880 ASSAY OF NATRIURETIC PEPTIDE: CPT | Performed by: STUDENT IN AN ORGANIZED HEALTH CARE EDUCATION/TRAINING PROGRAM

## 2023-12-06 PROCEDURE — 93010 ELECTROCARDIOGRAM REPORT: CPT | Mod: ,,, | Performed by: INTERNAL MEDICINE

## 2023-12-06 PROCEDURE — 84100 ASSAY OF PHOSPHORUS: CPT | Performed by: STUDENT IN AN ORGANIZED HEALTH CARE EDUCATION/TRAINING PROGRAM

## 2023-12-06 PROCEDURE — 83605 ASSAY OF LACTIC ACID: CPT

## 2023-12-06 PROCEDURE — 85025 COMPLETE CBC W/AUTO DIFF WBC: CPT | Performed by: STUDENT IN AN ORGANIZED HEALTH CARE EDUCATION/TRAINING PROGRAM

## 2023-12-06 PROCEDURE — 84484 ASSAY OF TROPONIN QUANT: CPT | Performed by: STUDENT IN AN ORGANIZED HEALTH CARE EDUCATION/TRAINING PROGRAM

## 2023-12-06 RX ORDER — ACETAMINOPHEN 325 MG/1
650 TABLET ORAL
Status: COMPLETED | OUTPATIENT
Start: 2023-12-06 | End: 2023-12-06

## 2023-12-06 RX ADMIN — ACETAMINOPHEN 650 MG: 325 TABLET ORAL at 07:12

## 2023-12-06 NOTE — ED NOTES
Attempted to call U/S twice regarding pt wait time, no answer. Charge nurse notified and aware. MD notified and aware.

## 2023-12-06 NOTE — ED NOTES
C/o right leg pain and darkened area above right knee after rubbing some pain ointment on it this morning. Denies fever/chills, cough, worsening SOB or CP. States he noticed some ulcers to right inner leg after he was discharged from hospital previously

## 2023-12-06 NOTE — ED NOTES
Rounding on the patient has been done. The patient has been updated on the plan of care and current status. Pain was assessed and is currently a 0/10 Comfort positioning and restroom needs were addressed. Necessary items were placed with in reach and was advised when a reassessment would take place. The call bell remains at the bedside for any additional patient needs. The patient is resting comfortably on the stretcher, respirations are even and unlabored, skin warm and dry.

## 2023-12-06 NOTE — PROVIDER PROGRESS NOTES - EMERGENCY DEPT.
Encounter Date: 12/6/2023    ED Physician Progress Notes        Physician Note:   I received this patient in sign out from the previous team.  I have discussed the patient's history and presentation in the ED with Dr. Saavedra  The patient is a 68 y.o. male who presented to the ED with Leg Pain (Put pain ointment on r upper leg, )    Significant history and PE findings:   Bilateral lower extremity swelling with right greater than left, multiple medical comorbidities, chronic ankle wound that has been seen by wound care multiple times which the patient attributes to a prior yellow jacket sting over 30 years ago.  On Lasix.  Has CHF.  Also on spironolactone.  Reports compliance.  No chest pain or dyspnea  Significant diagnostic results: reviewed  Pending studies and/or consultations:   Pending ultrasound of the lower extremities  Disposition:  Will continue to monitor, update patient on results of testing and determine appropriate additional treatment for the duration of stay in ED.  Pertinent lab and imaging findings are below.  Dani Lundy, DO 3:04 PM 12/6/2023      Pending US.    6:20 PM  US aware.  This has been a long wait for ultrasound.  Disposition pending US as patient is adamant about not staying inpatient but would like the ultrasound results.    9:01 PM  Impression:     No evidence of deep venous thrombosis in either lower extremity.     Bilateral inguinal lymphadenopathy, follow-up recommended.     Primarily hypoechoic collection superior to the right knee.  This is in a region of subcutaneous edema and superficial skin bruising.  Findings may reflect hematoma in the setting of trauma however correlation and follow-up is advised.  Infected collection not excluded though no secondary findings to support the same.    9:04 PM   I looked the patient's leg.  There is an area of ecchymosis consistent with trauma.  He has no elevated white blood cell count and no fever.  I doubt infection.  There is no  evidence of DVT.  His edema in the lower extremities appear to be chronic,   Potentially from lymphedema versus poor venous flow.  He has chronic wounds.  I placed a consult for referral for wound care.  I talked to his daughter on the phone.  She will come and  the patient.  He was stable for discharge at this time as he is at his baseline otherwise.

## 2023-12-07 ENCOUNTER — OFFICE VISIT (OUTPATIENT)
Dept: INFECTIOUS DISEASES | Facility: CLINIC | Age: 68
End: 2023-12-07
Payer: MEDICARE

## 2023-12-07 ENCOUNTER — TELEPHONE (OUTPATIENT)
Dept: WOUND CARE | Facility: CLINIC | Age: 68
End: 2023-12-07
Payer: MEDICARE

## 2023-12-07 VITALS
DIASTOLIC BLOOD PRESSURE: 60 MMHG | WEIGHT: 246.5 LBS | BODY MASS INDEX: 29.11 KG/M2 | TEMPERATURE: 98 F | HEART RATE: 60 BPM | HEIGHT: 77 IN | SYSTOLIC BLOOD PRESSURE: 95 MMHG

## 2023-12-07 DIAGNOSIS — T14.8XXA WOUND INFECTION: ICD-10-CM

## 2023-12-07 DIAGNOSIS — L97.319 LOWER LIMB ULCER, ANKLE, RIGHT, WITH UNSPECIFIED SEVERITY: Primary | ICD-10-CM

## 2023-12-07 DIAGNOSIS — L08.9 WOUND INFECTION: ICD-10-CM

## 2023-12-07 PROCEDURE — 99999 PR PBB SHADOW E&M-EST. PATIENT-LVL V: ICD-10-PCS | Mod: PBBFAC,,, | Performed by: NURSE PRACTITIONER

## 2023-12-07 PROCEDURE — 3288F PR FALLS RISK ASSESSMENT DOCUMENTED: ICD-10-PCS | Mod: CPTII,S$GLB,, | Performed by: NURSE PRACTITIONER

## 2023-12-07 PROCEDURE — 99999 PR PBB SHADOW E&M-EST. PATIENT-LVL V: CPT | Mod: PBBFAC,,, | Performed by: NURSE PRACTITIONER

## 2023-12-07 PROCEDURE — 1111F DSCHRG MED/CURRENT MED MERGE: CPT | Mod: CPTII,S$GLB,, | Performed by: NURSE PRACTITIONER

## 2023-12-07 PROCEDURE — 3078F DIAST BP <80 MM HG: CPT | Mod: CPTII,S$GLB,, | Performed by: NURSE PRACTITIONER

## 2023-12-07 PROCEDURE — 3074F SYST BP LT 130 MM HG: CPT | Mod: CPTII,S$GLB,, | Performed by: NURSE PRACTITIONER

## 2023-12-07 PROCEDURE — 3044F HG A1C LEVEL LT 7.0%: CPT | Mod: CPTII,S$GLB,, | Performed by: NURSE PRACTITIONER

## 2023-12-07 PROCEDURE — 3044F PR MOST RECENT HEMOGLOBIN A1C LEVEL <7.0%: ICD-10-PCS | Mod: CPTII,S$GLB,, | Performed by: NURSE PRACTITIONER

## 2023-12-07 PROCEDURE — 99204 PR OFFICE/OUTPT VISIT, NEW, LEVL IV, 45-59 MIN: ICD-10-PCS | Mod: S$GLB,,, | Performed by: NURSE PRACTITIONER

## 2023-12-07 PROCEDURE — 1125F PR PAIN SEVERITY QUANTIFIED, PAIN PRESENT: ICD-10-PCS | Mod: CPTII,S$GLB,, | Performed by: NURSE PRACTITIONER

## 2023-12-07 PROCEDURE — 1160F PR REVIEW ALL MEDS BY PRESCRIBER/CLIN PHARMACIST DOCUMENTED: ICD-10-PCS | Mod: CPTII,S$GLB,, | Performed by: NURSE PRACTITIONER

## 2023-12-07 PROCEDURE — 3288F FALL RISK ASSESSMENT DOCD: CPT | Mod: CPTII,S$GLB,, | Performed by: NURSE PRACTITIONER

## 2023-12-07 PROCEDURE — 99204 OFFICE O/P NEW MOD 45 MIN: CPT | Mod: S$GLB,,, | Performed by: NURSE PRACTITIONER

## 2023-12-07 PROCEDURE — 1159F PR MEDICATION LIST DOCUMENTED IN MEDICAL RECORD: ICD-10-PCS | Mod: CPTII,S$GLB,, | Performed by: NURSE PRACTITIONER

## 2023-12-07 PROCEDURE — 4010F PR ACE/ARB THEARPY RXD/TAKEN: ICD-10-PCS | Mod: CPTII,S$GLB,, | Performed by: NURSE PRACTITIONER

## 2023-12-07 PROCEDURE — 1111F PR DISCHARGE MEDS RECONCILED W/ CURRENT OUTPATIENT MED LIST: ICD-10-PCS | Mod: CPTII,S$GLB,, | Performed by: NURSE PRACTITIONER

## 2023-12-07 PROCEDURE — 4010F ACE/ARB THERAPY RXD/TAKEN: CPT | Mod: CPTII,S$GLB,, | Performed by: NURSE PRACTITIONER

## 2023-12-07 PROCEDURE — 1125F AMNT PAIN NOTED PAIN PRSNT: CPT | Mod: CPTII,S$GLB,, | Performed by: NURSE PRACTITIONER

## 2023-12-07 PROCEDURE — 3008F BODY MASS INDEX DOCD: CPT | Mod: CPTII,S$GLB,, | Performed by: NURSE PRACTITIONER

## 2023-12-07 PROCEDURE — 1101F PR PT FALLS ASSESS DOC 0-1 FALLS W/OUT INJ PAST YR: ICD-10-PCS | Mod: CPTII,S$GLB,, | Performed by: NURSE PRACTITIONER

## 2023-12-07 PROCEDURE — 1101F PT FALLS ASSESS-DOCD LE1/YR: CPT | Mod: CPTII,S$GLB,, | Performed by: NURSE PRACTITIONER

## 2023-12-07 PROCEDURE — 3008F PR BODY MASS INDEX (BMI) DOCUMENTED: ICD-10-PCS | Mod: CPTII,S$GLB,, | Performed by: NURSE PRACTITIONER

## 2023-12-07 PROCEDURE — 3074F PR MOST RECENT SYSTOLIC BLOOD PRESSURE < 130 MM HG: ICD-10-PCS | Mod: CPTII,S$GLB,, | Performed by: NURSE PRACTITIONER

## 2023-12-07 PROCEDURE — 3078F PR MOST RECENT DIASTOLIC BLOOD PRESSURE < 80 MM HG: ICD-10-PCS | Mod: CPTII,S$GLB,, | Performed by: NURSE PRACTITIONER

## 2023-12-07 PROCEDURE — 1159F MED LIST DOCD IN RCRD: CPT | Mod: CPTII,S$GLB,, | Performed by: NURSE PRACTITIONER

## 2023-12-07 PROCEDURE — 1160F RVW MEDS BY RX/DR IN RCRD: CPT | Mod: CPTII,S$GLB,, | Performed by: NURSE PRACTITIONER

## 2023-12-07 RX ORDER — DOXYCYCLINE 100 MG/1
100 CAPSULE ORAL EVERY 12 HOURS
Qty: 20 CAPSULE | Refills: 0 | Status: SHIPPED | OUTPATIENT
Start: 2023-12-07 | End: 2023-12-17

## 2023-12-07 RX ORDER — DOXYCYCLINE HYCLATE 100 MG
100 TABLET ORAL EVERY 12 HOURS
Qty: 20 TABLET | Refills: 0 | Status: SHIPPED | OUTPATIENT
Start: 2023-12-07 | End: 2023-12-07 | Stop reason: CLARIF

## 2023-12-07 NOTE — DISCHARGE INSTRUCTIONS
You were evaluated in the emergency department today for leg pain.  Although there were no findings of concern to necessitate admission to the hospital or warrant immediate surgical intervention, disease exists on a spectrum and your disease process may progress.  If this is the case, please watch your symptoms and return to the emergency department if you feel worse and are unable to discuss care with your primary care doctor in follow up in the next several days.  Specific information regarding your complaint has been provided.  Thank you for choosing [facility]!    Please follow up with the [facility] wound care center for continued care long term as your wound or burn heals.  Follow their instructions closely.  Watch for worsening signs of infection: fever over 100.5F, increasing pain, red streaks around wound, swelling, or increasing drainage of pus and return to the ER promptly if you feel this is occurring.

## 2023-12-07 NOTE — TELEPHONE ENCOUNTER
----- Message from Terra Wall MA sent at 12/7/2023  9:19 AM CST -----  Jake Goldstein has a referral for Wound Care. He is seeing ID today however I think he meant to schedule with WC. Can someone see if we can get him in today for wound care? Any help is greatly appreciated, thank you!

## 2023-12-07 NOTE — PROGRESS NOTES
Subjective:      Patient ID: Papito Bhakta is a 68 y.o. male.    Chief Complaint:Wound Care      History of Present Illness      Mr. Papito Bhakta is a 68 year old with HTN, chronic systolic/diastolic CHF with  AICD (replaced 9/8/2020) on amiodarone for VT with recent hospital admission 11/13 for CHF exacerbation with RIGOBERTO and CKD (d/c 11/15), chronic lower extremity edema, and chronic recurring ankle wounds, who went to ED  yesterday with bruise/skin discoloration and leg pain on his right upper leg (US negative for DVT, thought to be hematoma), discharged from ED with wound care referral, who presents today looking for wound care for his ankle wounds.  Daughter made the appointment with us today, thinking we provided wound care.     Patient reports he has on and off wounds to his right ankle ever since he had a yellow jacket sting to the area years ago.  The wounds come and go.  Most recently have been present maybe for a couple of weeks?    He has chronic leg swelling BLE but R>Left .  Area of discoloration and pain in upper leg (reason for his ED visit yesterday) has completely resolved.       He does not regularly follow with wound care.  Chart review shows he has followed with Podiatry for feet and nail care, but do not appear to have addressed ankle wounds.  Previously saw JENNA Curiel for Wound Care of pacemaker pocket wound in 2020.       Saw PCP last week. Advised to hold spironolactone for low b/p.   Noted to have scleral icterus and persistently elevated bilirubin.  Evaluation underway.  Says he hasn't had Cardiology follow up but appears last seen by Cards in October.     Denies purulent drainage from wounds.  Denies fevers, chills, sweats.  Wound are tender when probed.  No warmth/heat or erythema noted.         Review of Systems   Constitutional: Positive for decreased appetite, malaise/fatigue and weight loss (fluid loss since increasing diuresis). Negative for chills, diaphoresis, fever, night sweats  and weight gain.   HENT:  Negative for congestion, ear pain, hearing loss, hoarse voice, sore throat and tinnitus.    Eyes:  Negative for blurred vision, redness and visual disturbance.   Cardiovascular:  Positive for leg swelling. Negative for chest pain and palpitations.   Respiratory:  Negative for cough, hemoptysis, shortness of breath, sputum production and wheezing.    Hematologic/Lymphatic: Negative for adenopathy. Does not bruise/bleed easily.   Skin:  Positive for suspicious lesions. Negative for dry skin, itching and rash.   Musculoskeletal:  Positive for joint pain. Negative for back pain, myalgias and neck pain.   Gastrointestinal:  Positive for constipation. Negative for abdominal pain, diarrhea, heartburn, nausea and vomiting.   Genitourinary:  Negative for dysuria, flank pain, frequency, hematuria, hesitancy and urgency.   Neurological:  Negative for dizziness, headaches, numbness, paresthesias and weakness.   Psychiatric/Behavioral:  Negative for depression and memory loss. The patient does not have insomnia and is not nervous/anxious.      Objective:   Physical Exam  Constitutional:       General: He is not in acute distress.     Appearance: He is not ill-appearing, toxic-appearing or diaphoretic.   HENT:      Head: Normocephalic.      Mouth/Throat:      Mouth: Mucous membranes are moist.   Eyes:      Conjunctiva/sclera: Conjunctivae normal.   Cardiovascular:      Rate and Rhythm: Normal rate and regular rhythm.      Heart sounds: No murmur heard.  Pulmonary:      Effort: Pulmonary effort is normal. No respiratory distress.      Breath sounds: No stridor. No wheezing.   Abdominal:      Palpations: Abdomen is soft.   Musculoskeletal:      Right lower leg: Edema present.      Left lower leg: Edema present.      Comments: Bilateral edema - R >L.   Chronic skin changes/xerosis.     Small wound to right lateral ankle and 4 small wounds to medial ankle. They do not probe to bone or tunnel on my exam.  No  "purulent drainage. No surrounding cellulitis.   Bases appear to be combination granular tissue/slough?   No warmth.  No signs of active infection.    Neurological:      Mental Status: He is alert.               Assessment:     1. Lower limb ulcer, ankle, right, with unspecified severity    2. Wound infection       Suspect chronic recurring venous stasis wounds associated with chronic lower extremity swelling.  Currently no signs of local or systemic infection.    Plan:   No signs of active infection.  Needs Wound Care evaluation.  Appt made for 12/11 at 0830.    Will send with "pill in pocket" antibiotic in event anything seems to worsen over the weekend, before seeing Wound Care.  Sent in Rx for doxycycline 100 mg q 12 hours to take only prn.   Take with small amount of food and full glass of water and stay upright for 30 minutes after taking.  Do not take with dairy products, vitamins with minerals, or antacids. Separate by at least 2 hours.  May cause sun sensitivity.  Avoid direct sun exposure.   Leg elevation to decrease swelling  If wounds worsen with associated fevers/chills, then come to ED   Keep area clean and dry.  Do not soak wounds.    Wound Care as scheduled  Needs follow up with PCP and Cardiology.   8.   Discussed with patient and daughter who agree to plan of care  Given my contact information  10.  ID follow up as needed after Wound Care evaluation           45 minutes of total time was spent on this encounter, which includes face to face time and non-face to face time preparing to see the patient (eg, review of tests), Obtaining and/or reviewing separately obtained history, documenting clinical information in the electronic or other health record, independently interpreting results (not separately reported) and communicating results to the patient/family/caregiver, or care coordination (not separately reported).       "

## 2023-12-07 NOTE — ED NOTES
Assumed care of pt.  PT resting comfortably in stretcher with side rails up, on continuous pulse ox with call bell in reach.  Pt given sandwich and juices.

## 2023-12-07 NOTE — PHARMACY MED REC
"Admission Medication History     The home medication history was taken by Tyrel Isabel.    You may go to "Admission" then "Reconcile Home Medications" tabs to review and/or act upon these items.     The home medication list has been updated by the Pharmacy department.   Please read ALL comments highlighted in yellow.   Please address this information as you see fit.    Feel free to contact us if you have any questions or require assistance.      The medications listed below were removed from the home medication list. Please reorder if appropriate:  Patient reports no longer taking the following medication(s):  GABAPENTIN 100 MG CAPSULE  HYDROCORTISONE 1% CREAM  METHOCARBAMOL 500 MG TABLET  RAMIPRIL 10 MG CAPSULE  LOSARTAN 25 MG TABLET      Medications listed below were obtained from: Patient/family and Analytic software- [employer]  Current Outpatient Medications on File Prior to Encounter   Medication Sig    acetaminophen (TYLENOL) 500 MG tablet     Take 2 tablets (1,000 mg total) by mouth every 8 (eight) hours as needed for pain or temperature greater than (100.5).    amiodarone (PACERONE) 200 MG Tab   Take 1 tablet (200 mg total) by mouth once daily.    aspirin (ECOTRIN) 325 MG EC tablet   Take 1 tablet (325 mg total) by mouth once daily.    empagliflozin (JARDIANCE) 10 mg tablet   Take 1 tablet (10 mg total) by mouth once daily.    furosemide (LASIX) 80 MG tablet   Take 1 tablet by mouth once daily.    metoprolol succinate (TOPROL-XL) 50 MG 24 hr tablet   Take 1 tablet by mouth once daily.    potassium chloride (K-TAB) 20 mEq   Take 1 tablet by mouth once daily.    pravastatin (PRAVACHOL) 80 MG tablet   Take 1 tablet by mouth once daily.    spironolactone (ALDACTONE) 25 MG tablet     Take 1/2 tablet by mouth once daily.     HOLD IF SYSTOLIC BLOOD PRESSURE IS LESS THAN 110   (Patient not taking:Due to low BP Reported on 12/6/2023)             Potential issues to be addressed PRIOR TO DISCHARGE  Patient requires " education regarding drug therapies     Tyrel Isabel  EXT [id number]                  .

## 2023-12-07 NOTE — PATIENT INSTRUCTIONS
I am going to get you into Wound Care - Appt made for Monday 12/11 at 0830.   The wounds do not look infected right now, but I am going to send you with a prescription for a pill antibiotic to use only in the event the wounds worsen, you develop redness around the wounds and increased drainage.  It is called doxycycline.  Take one twice a day.  Take with small amount of food and full glass of water and stay upright for 30 minutes after taking.  Do not take with dairy products, vitamins with minerals, or antacids. Separate by at least 2 hours.  May cause sun sensitivity.  Avoid direct sun exposure.   Make sure you elevate your legs to decrease swelling  If wounds worsen with associated fevers/chills, then come to ED   Follow up with PCP and Cardiology  Do not soak your wounds in any water.

## 2023-12-08 ENCOUNTER — TELEPHONE (OUTPATIENT)
Dept: FAMILY MEDICINE | Facility: CLINIC | Age: 68
End: 2023-12-08
Payer: MEDICARE

## 2023-12-08 NOTE — TELEPHONE ENCOUNTER
----- Message from Ozzy Martines NP sent at 12/4/2023  2:49 PM CST -----  Please call patient about his labs.  His kidney function is stable and his liver enzymes have improved, however his bilirubin is still high.  I would like to recheck these again in 2 weeks.  I have placed orders for a CBC, CMP, hepatic panel, and INR level.  Please call patient to schedule these.  Thank you

## 2023-12-10 ENCOUNTER — NURSE TRIAGE (OUTPATIENT)
Dept: ADMINISTRATIVE | Facility: CLINIC | Age: 68
End: 2023-12-10
Payer: MEDICARE

## 2023-12-10 NOTE — TELEPHONE ENCOUNTER
"Spoke with daughter of pt who reports that pt has been having constipation, and reports SOB. BP taken while on phone with nurse. At noted to be 83/67, HR 62 o2 sat 97%. States pt was recently in hospital. And had 30lbs of fluid removed. Advised for pt to be seen in ED. Verbalized understanding.    Reason for Disposition   [1] Fall in systolic BP > 20 mm Hg from normal AND [2] dizzy, lightheaded, or weak    Additional Information   Negative: Started suddenly after an allergic medicine, an allergic food, or bee sting   Negative: Shock suspected (e.g., cold/pale/clammy skin, too weak to stand, low BP, rapid pulse)   Negative: Difficult to awaken or acting confused (e.g., disoriented, slurred speech)   Negative: Fainted   Negative: [1] Systolic BP < 90 AND [2] dizzy, lightheaded, or weak   Negative: Chest pain   Negative: Bleeding (e.g., vomiting blood, rectal bleeding or tarry stools, severe vaginal bleeding)(Exception: Fainted from sight of small amount of blood; small cut or abrasion.)   Negative: Extra heartbeats, irregular heart beating, or heart is beating very fast  (i.e., "palpitations")   Negative: Sounds like a life-threatening emergency to the triager   Negative: [1] Systolic BP < 80 AND [2] NOT dizzy, lightheaded or weak   Negative: Abdominal pain   Negative: Fever > 100.4 F (38.0 C)   Negative: Major surgery in the past month   Negative: [1] Drinking very little AND [2] dehydration suspected (e.g., no urine > 12 hours, very dry mouth, very lightheaded)    Protocols used: Blood Pressure - Low-A-AH    "

## 2023-12-11 ENCOUNTER — OFFICE VISIT (OUTPATIENT)
Dept: WOUND CARE | Facility: CLINIC | Age: 68
End: 2023-12-11
Payer: MEDICARE

## 2023-12-11 VITALS
HEART RATE: 60 BPM | TEMPERATURE: 98 F | DIASTOLIC BLOOD PRESSURE: 71 MMHG | BODY MASS INDEX: 29.67 KG/M2 | SYSTOLIC BLOOD PRESSURE: 97 MMHG | WEIGHT: 250.25 LBS

## 2023-12-11 DIAGNOSIS — N17.9 ACUTE RENAL FAILURE SUPERIMPOSED ON STAGE 3A CHRONIC KIDNEY DISEASE, UNSPECIFIED ACUTE RENAL FAILURE TYPE: ICD-10-CM

## 2023-12-11 DIAGNOSIS — R60.0 BILATERAL LEG EDEMA: ICD-10-CM

## 2023-12-11 DIAGNOSIS — S81.801A MULTIPLE OPEN WOUNDS OF RIGHT LOWER EXTREMITY, INITIAL ENCOUNTER: Primary | ICD-10-CM

## 2023-12-11 DIAGNOSIS — N18.31 ACUTE RENAL FAILURE SUPERIMPOSED ON STAGE 3A CHRONIC KIDNEY DISEASE, UNSPECIFIED ACUTE RENAL FAILURE TYPE: ICD-10-CM

## 2023-12-11 DIAGNOSIS — I73.9 PERIPHERAL VASCULAR DISEASE, UNSPECIFIED: ICD-10-CM

## 2023-12-11 DIAGNOSIS — I50.42 CHRONIC COMBINED SYSTOLIC AND DIASTOLIC CONGESTIVE HEART FAILURE: ICD-10-CM

## 2023-12-11 DIAGNOSIS — I42.8 NICM (NONISCHEMIC CARDIOMYOPATHY): Chronic | ICD-10-CM

## 2023-12-11 LAB
BACTERIA BLD CULT: NORMAL
BACTERIA BLD CULT: NORMAL

## 2023-12-11 PROCEDURE — 99999 PR PBB SHADOW E&M-EST. PATIENT-LVL V: CPT | Mod: PBBFAC,,,

## 2023-12-11 PROCEDURE — 1125F PR PAIN SEVERITY QUANTIFIED, PAIN PRESENT: ICD-10-PCS | Mod: CPTII,S$GLB,,

## 2023-12-11 PROCEDURE — 1159F PR MEDICATION LIST DOCUMENTED IN MEDICAL RECORD: ICD-10-PCS | Mod: CPTII,S$GLB,,

## 2023-12-11 PROCEDURE — 3078F DIAST BP <80 MM HG: CPT | Mod: CPTII,S$GLB,,

## 2023-12-11 PROCEDURE — 3044F PR MOST RECENT HEMOGLOBIN A1C LEVEL <7.0%: ICD-10-PCS | Mod: CPTII,S$GLB,,

## 2023-12-11 PROCEDURE — 99214 PR OFFICE/OUTPT VISIT, EST, LEVL IV, 30-39 MIN: ICD-10-PCS | Mod: S$GLB,,,

## 2023-12-11 PROCEDURE — 3008F PR BODY MASS INDEX (BMI) DOCUMENTED: ICD-10-PCS | Mod: CPTII,S$GLB,,

## 2023-12-11 PROCEDURE — 3074F SYST BP LT 130 MM HG: CPT | Mod: CPTII,S$GLB,,

## 2023-12-11 PROCEDURE — 1111F DSCHRG MED/CURRENT MED MERGE: CPT | Mod: CPTII,S$GLB,,

## 2023-12-11 PROCEDURE — 3288F PR FALLS RISK ASSESSMENT DOCUMENTED: ICD-10-PCS | Mod: CPTII,S$GLB,,

## 2023-12-11 PROCEDURE — 3008F BODY MASS INDEX DOCD: CPT | Mod: CPTII,S$GLB,,

## 2023-12-11 PROCEDURE — 99999 PR PBB SHADOW E&M-EST. PATIENT-LVL V: ICD-10-PCS | Mod: PBBFAC,,,

## 2023-12-11 PROCEDURE — 1159F MED LIST DOCD IN RCRD: CPT | Mod: CPTII,S$GLB,,

## 2023-12-11 PROCEDURE — 3078F PR MOST RECENT DIASTOLIC BLOOD PRESSURE < 80 MM HG: ICD-10-PCS | Mod: CPTII,S$GLB,,

## 2023-12-11 PROCEDURE — 1111F PR DISCHARGE MEDS RECONCILED W/ CURRENT OUTPATIENT MED LIST: ICD-10-PCS | Mod: CPTII,S$GLB,,

## 2023-12-11 PROCEDURE — 1101F PR PT FALLS ASSESS DOC 0-1 FALLS W/OUT INJ PAST YR: ICD-10-PCS | Mod: CPTII,S$GLB,,

## 2023-12-11 PROCEDURE — 99214 OFFICE O/P EST MOD 30 MIN: CPT | Mod: S$GLB,,,

## 2023-12-11 PROCEDURE — 1125F AMNT PAIN NOTED PAIN PRSNT: CPT | Mod: CPTII,S$GLB,,

## 2023-12-11 PROCEDURE — 3288F FALL RISK ASSESSMENT DOCD: CPT | Mod: CPTII,S$GLB,,

## 2023-12-11 PROCEDURE — 3074F PR MOST RECENT SYSTOLIC BLOOD PRESSURE < 130 MM HG: ICD-10-PCS | Mod: CPTII,S$GLB,,

## 2023-12-11 PROCEDURE — 4010F PR ACE/ARB THEARPY RXD/TAKEN: ICD-10-PCS | Mod: CPTII,S$GLB,,

## 2023-12-11 PROCEDURE — 4010F ACE/ARB THERAPY RXD/TAKEN: CPT | Mod: CPTII,S$GLB,,

## 2023-12-11 PROCEDURE — 3044F HG A1C LEVEL LT 7.0%: CPT | Mod: CPTII,S$GLB,,

## 2023-12-11 PROCEDURE — 1101F PT FALLS ASSESS-DOCD LE1/YR: CPT | Mod: CPTII,S$GLB,,

## 2023-12-11 NOTE — TELEPHONE ENCOUNTER
Called pt daughter , asked if father went to ED . No l, asked if symptoms still occurring . No longer  having s.o.b , didn't know bp . Has constipation , current at wound care apt awaiting vitals to be taken .

## 2023-12-11 NOTE — PROGRESS NOTES
Subjective:       Patient ID: Papito Bhakta is a 68 y.o. male.    Chief Complaint: Wound Consult      Patient presents with his daughter for an evaluation of right lower extremity wounds. He reports that he had a bee sting to his right medial leg in 1986. Pt reports that he has had wounds off and on to his medial right lower extremity. Other providers have told him it is from his leg swelling, but patient reports it is from the bee sting. Pt notes a history of chronic leg swelling- right > left. He reports these wounds started a few weeks ago. Pt notes that this is the first time he has a right lateral lower extremity wound. Pt previously wore compression stockings regularly but has since stopped. Pt has been placing triple antibiotic cream to his wounds and leaving them open to air. When he is walking around his house, pt will cover his wounds with a shopping bag. He tried to cover his wounds with a sock, but at the time his wounds were draining. He reports his wounds no longer drain. Pt reports smoking about 1 ppd for about 20 years. Admits compliance with lasix. Pt saw ID on 12/6/23 who prescribed doxycyline as needed and referred him to wound care. Denies fever, chills, erythema, warmth, purulent drainage, or pain.      12/6/23 venous ultrasound:  No evidence of deep venous thrombosis in either lower extremity.        Review of Systems   Constitutional:  Negative for activity change, chills, diaphoresis, fatigue and fever.   Respiratory:  Negative for apnea, chest tightness and shortness of breath.    Cardiovascular:  Positive for leg swelling. Negative for chest pain and palpitations.   Musculoskeletal:  Negative for gait problem and joint swelling.   Skin:  Positive for wound. Negative for color change, pallor and rash.   Neurological:  Negative for syncope, weakness and numbness.   Psychiatric/Behavioral:  Negative for agitation. The patient is not nervous/anxious.    All other systems reviewed and are  negative.      Objective:      Physical Exam  Vitals reviewed.   Constitutional:       General: He is not in acute distress.     Appearance: Normal appearance.   Cardiovascular:      Pulses:           Dorsalis pedis pulses are detected w/ Doppler on the right side.        Posterior tibial pulses are 0 on the right side.   Pulmonary:      Effort: No respiratory distress.   Musculoskeletal:         General: No swelling.      Right lower leg: Edema present.      Left lower leg: Edema present.   Skin:     General: Skin is warm and dry.      Capillary Refill: Capillary refill takes more than 3 seconds.      Findings: Wound present. No erythema.          Neurological:      General: No focal deficit present.      Mental Status: He is alert and oriented to person, place, and time.   Psychiatric:         Mood and Affect: Mood normal.         Behavior: Behavior normal.         Thought Content: Thought content normal.         Judgment: Judgment normal.         Assessment:       1. Multiple open wounds of right lower extremity, initial encounter    2. Bilateral leg edema    3. Chronic combined systolic and diastolic congestive heart failure    4. NICM (nonischemic cardiomyopathy)    5. Peripheral vascular disease, unspecified    6. Acute renal failure superimposed on stage 3a chronic kidney disease, unspecified acute renal failure type           Altered Skin Integrity Right lower;medial Leg (Active)       Altered Skin Integrity Present on Admission - Did Patient arrive to the hospital with altered skin?:    Side: Right   Orientation: lower;medial   Location: Leg   Wound Number:    Is this injury device related?:    Primary Wound Type:    Description of Altered Skin Integrity:    Ankle-Brachial Index:    Pulses:    Removal Indication and Assessment:    Wound Outcome:    (Retired) Wound Length (cm):    (Retired) Wound Width (cm):    (Retired) Depth (cm):    Wound Description (Comments):    Removal Indications:    Wound Image    12/11/23 1344   Dressing Appearance Open to air 12/11/23 1344   Drainage Amount None 12/11/23 1344   Yellow (%), Wound Tissue Color 100 % 12/11/23 1344   Periwound Area Intact;Edematous 12/11/23 1344   Wound Length (cm) 9 cm 12/11/23 1344   Wound Width (cm) 6.5 cm 12/11/23 1344   Wound Depth (cm) 0.1 cm 12/11/23 1344   Wound Volume (cm^3) 5.85 cm^3 12/11/23 1344   Wound Surface Area (cm^2) 58.5 cm^2 12/11/23 1344   Care Cleansed with:;Soap and water 12/11/23 1344   Dressing Applied;Island/border;Hydrogel;Foam 12/11/23 1344   Periwound Care Skin barrier film applied 12/11/23 1344            Altered Skin Integrity Right lower;lateral Leg (Active)       Altered Skin Integrity Present on Admission - Did Patient arrive to the hospital with altered skin?:    Side: Right   Orientation: lower;lateral   Location: Leg   Wound Number:    Is this injury device related?:    Primary Wound Type:    Description of Altered Skin Integrity:    Ankle-Brachial Index:    Pulses:    Removal Indication and Assessment:    Wound Outcome:    (Retired) Wound Length (cm):    (Retired) Wound Width (cm):    (Retired) Depth (cm):    Wound Description (Comments):    Removal Indications:    Wound Image   12/11/23 1344   Dressing Appearance Open to air 12/11/23 1344   Drainage Amount None 12/11/23 1344   Yellow (%), Wound Tissue Color 100 % 12/11/23 1344   Periwound Area Intact;Edematous 12/11/23 1344   Wound Length (cm) 3 cm 12/11/23 1344   Wound Width (cm) 1.9 cm 12/11/23 1344   Wound Depth (cm) 0.1 cm 12/11/23 1344   Wound Volume (cm^3) 0.57 cm^3 12/11/23 1344   Wound Surface Area (cm^2) 5.7 cm^2 12/11/23 1344   Care Cleansed with:;Soap and water 12/11/23 1344   Dressing Applied;Hydrogel;Island/border;Foam 12/11/23 1344   Periwound Care Skin barrier film applied 12/11/23 1344       Papito was seen in the clinic room and placed in the supine position on the treatment table.  The area was cleansed with Easi-clense sponges and dried thoroughly. No  odor, erythema, or warmth noted. Deferred sharp debridement.     Plan of Care: Skin prep to periwounds, hydrogel to wound beds, hydrofera blue, and covered with border dressings.          Plan:       Orders Placed This Encounter   Procedures    VAS Ankle Brachial Indices Resting     Standing Status:   Future     Standing Expiration Date:   12/11/2024     Order Specific Question:   Release to patient     Answer:   Immediate       Right lower extremity was dressed as detailed above.  Patient was instructed to not get the dressings wet and to use cast covers for showering.  Should the dressing become wet, he is to remove it, place a moist dressing over the wound, cover with gauze and roll gauze and to secure bandages.  He should then notify this office as soon as possible to have a new dressing applied.    Ordered ABIs to assess vascular status. Will utilize compression wraps depending on results.    Discussed nutrition and the role of protein in wound healing with the patient. Instructed patient to optimize protein for wound healing.     Discussed bilateral lower extremity edema with patient. Instructed patient to elevate legs whenever sedentary, follow a low sodium diet, and to take lasix as prescribed.    Instructed patient to not leave wounds open to air.     Written and verbal instructions given to patient  RTC in 1 week will perform sharp debridement at that time.      Deborah Nicole PA-C

## 2023-12-12 ENCOUNTER — CLINICAL SUPPORT (OUTPATIENT)
Dept: CARDIOLOGY | Facility: HOSPITAL | Age: 68
End: 2023-12-12
Payer: MEDICARE

## 2023-12-12 ENCOUNTER — CLINICAL SUPPORT (OUTPATIENT)
Dept: CARDIOLOGY | Facility: HOSPITAL | Age: 68
End: 2023-12-12
Attending: INTERNAL MEDICINE
Payer: MEDICARE

## 2023-12-12 DIAGNOSIS — I50.9 HEART FAILURE, UNSPECIFIED: ICD-10-CM

## 2023-12-12 PROCEDURE — 93295 DEV INTERROG REMOTE 1/2/MLT: CPT | Mod: ,,, | Performed by: INTERNAL MEDICINE

## 2023-12-12 PROCEDURE — 93296 REM INTERROG EVL PM/IDS: CPT | Performed by: INTERNAL MEDICINE

## 2023-12-19 ENCOUNTER — OFFICE VISIT (OUTPATIENT)
Dept: ORTHOPEDICS | Facility: CLINIC | Age: 68
End: 2023-12-19
Attending: ORTHOPAEDIC SURGERY
Payer: MEDICARE

## 2023-12-19 VITALS — HEIGHT: 77 IN | WEIGHT: 250 LBS | BODY MASS INDEX: 29.52 KG/M2

## 2023-12-19 DIAGNOSIS — M17.11 PRIMARY OSTEOARTHRITIS OF RIGHT KNEE: Primary | ICD-10-CM

## 2023-12-19 DIAGNOSIS — M25.561 CHRONIC PAIN OF BOTH KNEES: ICD-10-CM

## 2023-12-19 DIAGNOSIS — M16.0 PRIMARY OSTEOARTHRITIS OF BOTH HIPS: ICD-10-CM

## 2023-12-19 DIAGNOSIS — M25.562 CHRONIC PAIN OF BOTH KNEES: ICD-10-CM

## 2023-12-19 DIAGNOSIS — G89.29 CHRONIC PAIN OF BOTH KNEES: ICD-10-CM

## 2023-12-19 PROCEDURE — 3044F HG A1C LEVEL LT 7.0%: CPT | Mod: CPTII,S$GLB,, | Performed by: ORTHOPAEDIC SURGERY

## 2023-12-19 PROCEDURE — 99999 PR PBB SHADOW E&M-EST. PATIENT-LVL III: ICD-10-PCS | Mod: PBBFAC,,, | Performed by: ORTHOPAEDIC SURGERY

## 2023-12-19 PROCEDURE — 20610 LARGE JOINT ASPIRATION/INJECTION: R KNEE: ICD-10-PCS | Mod: RT,S$GLB,, | Performed by: ORTHOPAEDIC SURGERY

## 2023-12-19 PROCEDURE — 99999 PR PBB SHADOW E&M-EST. PATIENT-LVL III: CPT | Mod: PBBFAC,,, | Performed by: ORTHOPAEDIC SURGERY

## 2023-12-19 PROCEDURE — 99204 PR OFFICE/OUTPT VISIT, NEW, LEVL IV, 45-59 MIN: ICD-10-PCS | Mod: 25,S$GLB,, | Performed by: ORTHOPAEDIC SURGERY

## 2023-12-19 PROCEDURE — 3008F PR BODY MASS INDEX (BMI) DOCUMENTED: ICD-10-PCS | Mod: CPTII,S$GLB,, | Performed by: ORTHOPAEDIC SURGERY

## 2023-12-19 PROCEDURE — 20610 DRAIN/INJ JOINT/BURSA W/O US: CPT | Mod: RT,S$GLB,, | Performed by: ORTHOPAEDIC SURGERY

## 2023-12-19 PROCEDURE — 3008F BODY MASS INDEX DOCD: CPT | Mod: CPTII,S$GLB,, | Performed by: ORTHOPAEDIC SURGERY

## 2023-12-19 PROCEDURE — 1125F PR PAIN SEVERITY QUANTIFIED, PAIN PRESENT: ICD-10-PCS | Mod: CPTII,S$GLB,, | Performed by: ORTHOPAEDIC SURGERY

## 2023-12-19 PROCEDURE — 3044F PR MOST RECENT HEMOGLOBIN A1C LEVEL <7.0%: ICD-10-PCS | Mod: CPTII,S$GLB,, | Performed by: ORTHOPAEDIC SURGERY

## 2023-12-19 PROCEDURE — 4010F PR ACE/ARB THEARPY RXD/TAKEN: ICD-10-PCS | Mod: CPTII,S$GLB,, | Performed by: ORTHOPAEDIC SURGERY

## 2023-12-19 PROCEDURE — 4010F ACE/ARB THERAPY RXD/TAKEN: CPT | Mod: CPTII,S$GLB,, | Performed by: ORTHOPAEDIC SURGERY

## 2023-12-19 PROCEDURE — 1159F MED LIST DOCD IN RCRD: CPT | Mod: CPTII,S$GLB,, | Performed by: ORTHOPAEDIC SURGERY

## 2023-12-19 PROCEDURE — 1159F PR MEDICATION LIST DOCUMENTED IN MEDICAL RECORD: ICD-10-PCS | Mod: CPTII,S$GLB,, | Performed by: ORTHOPAEDIC SURGERY

## 2023-12-19 PROCEDURE — 99204 OFFICE O/P NEW MOD 45 MIN: CPT | Mod: 25,S$GLB,, | Performed by: ORTHOPAEDIC SURGERY

## 2023-12-19 PROCEDURE — 1125F AMNT PAIN NOTED PAIN PRSNT: CPT | Mod: CPTII,S$GLB,, | Performed by: ORTHOPAEDIC SURGERY

## 2023-12-19 RX ORDER — TRIAMCINOLONE ACETONIDE 40 MG/ML
40 INJECTION, SUSPENSION INTRA-ARTICULAR; INTRAMUSCULAR
Status: DISCONTINUED | OUTPATIENT
Start: 2023-12-19 | End: 2023-12-19 | Stop reason: HOSPADM

## 2023-12-19 RX ADMIN — TRIAMCINOLONE ACETONIDE 40 MG: 40 INJECTION, SUSPENSION INTRA-ARTICULAR; INTRAMUSCULAR at 10:12

## 2023-12-19 NOTE — PROCEDURES
Large Joint Aspiration/Injection: R knee    Date/Time: 12/19/2023 10:00 AM    Performed by: Henok García MD  Authorized by: Henok García MD    Consent Done?:  Yes (Verbal)  Indications:  Arthritis and pain  Prep: patient was prepped and draped in usual sterile fashion      Local anesthesia used?: Yes    Anesthesia:  Local infiltration  Local anesthetic:  Topical anesthetic and lidocaine 1% without epinephrine    Details:  Needle Size:  22 G  Approach:  Anterolateral  Location:  Knee  Site:  R knee  Medications:  40 mg triamcinolone acetonide 40 mg/mL  Patient tolerance:  Patient tolerated the procedure well with no immediate complications

## 2023-12-19 NOTE — PROGRESS NOTES
NEW PATIENT ORTHOPAEDIC: Knee    PRIMARY CARE PHYSICIAN: Brynn To MD   REFERRING PROVIDER: Ozzy Martines, NP  [address] [place] [address]     ASSESSMENT & PLAN:    Impression:  Bilateral Knee Mild Osteoarthritis, Primary    Bilateral Hip Severe Osteoarthritis, Primary    Follow Up Plan: PRN     Injection:     Papito Bhakta has physical exam evidence of above and wishes to pursue an injection. We discussed alternative non operative modalities including physical therapy, anti-inflammatories, bracing, maintenance of appropriate weight, rest, ambulatory devices. We discussed the risk, benefits, and expectations regarding injection. They have elected to proceed with injection. Should injections fail next step would be PT. See procedure note for billing purposes.     Non operative care:    Papito Bhakta has physical exam evidence of above and wishes to pursue an non-operative care. I am recommending the following: Injection into right knee    Patient comes in with a long history of having bilateral knee pain as his primary complaint.  He associates this with debility after having a pacemaker removed and prolonged hospitalization.  He has been on crutches for the last 3 years relative to his lower extremity pains.  His chief complaint today is bilateral knee pain right worse than left.  I have new x-rays today which demonstrate some mild arthritis of his knees but nothing significant.  He does have a good amount of fluid retention and chronic venous stasis with open ulcerations to his bilateral lower extremities for which he is undergoing wound care and currently being worked up for vascular based etiology of this pain.  I reviewed hip radiographs that were taken from a few years ago he has end-stage arthritis of his left greater than right hip.  This may be in her playing into some of his knee pain.  On clinical exam he has some mild medial joint line tenderness but nothing specific to his knee  however when I rotate his right hip this does produce some groin and thigh pain so it may be contributing to his ongoing knee pain.  Does not have this same level of pain on his left side even though those x-rays are worse of his hip.  Ultimately again his complaint being knee pain I think it is easy to think about doing a steroid injection into this knee to see if this helps if it fails to alleviate his symptoms it is either more hip based pathology or more likely related to a vascular etiology and this chronic swelling.  He has not a diabetic so I think he can tolerate the steroid injections.  He has certainly not a good surgical candidate secondary to his open wounds, lymphedema or persistent swelling as well as his cardiac issues.  I do not think he would benefit from a knee replacement.  Could potentially consider hip replacement if that was found to be the source of his ongoing issues but even prior to that consideration likely need some type of intra-articular injection into his hip for diagnostic purposes.  But he does not complain about hip pain per se.  He also has some confounding lumbar etiology that is a player in this.    The patient has been ordered:  Office Intraarticular injection    CONSULTS:   None    ACTIVE PROBLEM LIST  Patient Active Problem List   Diagnosis    Essential hypertension    Chronic combined systolic and diastolic congestive heart failure    Hyperlipidemia    Biventricular ICD (implantable cardioverter-defibrillator) in place    Chronic left shoulder pain    Decreased range of motion of left shoulder    Erosion of pacemaker pocket due to and not concurrent with implantation of cardiac pacemaker    NICM (nonischemic cardiomyopathy)    Thrombocytopenia, unspecified    Peripheral vascular disease, unspecified    Pulmonary nodule    Advance care planning    Emphysema lung    AMELIA (obstructive sleep apnea)    Ventricular tachycardia    Class 1 obesity due to excess calories with serious  comorbidity in adult    Acute renal failure superimposed on stage 3a chronic kidney disease    Acute on chronic combined systolic and diastolic CHF (congestive heart failure)    Nausea & vomiting    Elevated INR    Elevated LFTs           SUBJECTIVE    CHIEF COMPLAINT: Knee Pain    HPI:   Papito Bhakta is a 68 y.o. male here for evaluation and management of bilateral knee pain, right worse than left. There is not a specific incident that brought about this pain. he has had progressive problems with the knee(s) starting 3 years ago but is now progressing to interfere with activities which include: walking, functional household ADL's, rising from a sitting position, standing for prolonged periods of time, getting in and out of a car, and climbing stairs     Currently the pain in the joint is rated at moderate with activity. The pain is intermittent and is located in the knee, located medially, located laterally, and located anterior. The pain is described as aching, stiffness, and throbbing. Relieving factors include ambulatory device, rest, ice or heat, over the counter medication , and repositioning.     There is associated Clicking and Popping.     Papito Bhakta has no additional complaints.     PROGRESSIVE SYMPTOMS:  Pain worsened by weight bearing  Pain effecting living situation    FUNCTIONAL STATUS:   Limited most or all of the time (uses scooter, mobility device)    PREVIOUS TREATMENTS:  Medical: Tylenol  Physical Therapy: Use of Ambulatory Aid and Activities Modified   Previous Orthopaedic Surgery: None    REVIEW OF SYSTEMS:  PAIN ASSESSMENT:  See HPI.  MUSCULOSKELETAL: See HPI.  OTHER 10 point review of systems is negative except as stated in HPI above    PAST MEDICAL HISTORY   has a past medical history of RIGOBERTO (acute kidney injury) (10/7/2020), Anticoagulant long-term use, CHF (congestive heart failure), Hyperlipidemia, Hypertension, NICM (nonischemic cardiomyopathy) (6/16/2020), Obesity, AMELIA (obstructive  sleep apnea) (1/7/2022), and Peripheral vascular disease, unspecified (2/1/2021).     PAST SURGICAL HISTORY   has a past surgical history that includes Testicle surgery; oral extraction (11/2018); Insertion of biventricular implantable cardioverter-defibrillator (ICD) (N/A, 02/13/2019); and Insertion of biventricular implantable cardioverter-defibrillator (ICD) (N/A, 09/28/2020).     FAMILY HISTORY  family history includes Epilepsy in his mother.     SOCIAL HISTORY   reports that he quit smoking about 9 years ago. His smoking use included cigarettes and cigars. He started smoking about 49 years ago. He has a 20.0 pack-year smoking history. He has been exposed to tobacco smoke. He has never used smokeless tobacco. He reports current alcohol use. He reports that he does not use drugs.     ALLERGIES   Review of patient's allergies indicates:   Allergen Reactions    Ramipril      Leg swelling and gynecomastia     Losartan Rash        MEDICATIONS  Current Outpatient Medications on File Prior to Visit   Medication Sig Dispense Refill    acetaminophen (TYLENOL) 500 MG tablet Take 2 tablets (1,000 mg total) by mouth every 8 (eight) hours as needed for Pain or Temperature greater than (100.5). 30 tablet 0    amiodarone (PACERONE) 200 MG Tab Take 1 tablet (200 mg total) by mouth once daily. 90 tablet 3    aspirin (ECOTRIN) 325 MG EC tablet Take 1 tablet (325 mg total) by mouth once daily. 90 tablet 3    empagliflozin (JARDIANCE) 10 mg tablet Take 1 tablet (10 mg total) by mouth once daily. 90 tablet 3    furosemide (LASIX) 80 MG tablet TAKE 1 TABLET EVERY DAY 90 tablet 1    metoprolol succinate (TOPROL-XL) 50 MG 24 hr tablet TAKE 1 TABLET EVERY DAY 90 tablet 3    potassium chloride (K-TAB) 20 mEq TAKE 1 TABLET EVERY DAY 90 tablet 1    pravastatin (PRAVACHOL) 80 MG tablet TAKE 1 TABLET EVERY DAY 90 tablet 1    spironolactone (ALDACTONE) 25 MG tablet TAKE 1/2 TABLET (12.5 MG TOTAL) ONCE DAILY. HOLD IF SYSTOLIC BLOOD PRESSURE IS  "LESS THAN 110 (Patient not taking: Reported on 12/11/2023) 45 tablet 5    [DISCONTINUED] ramipril (ALTACE) 10 MG capsule Take 1 capsule (10 mg total) by mouth once daily. 90 capsule 3     No current facility-administered medications on file prior to visit.          PHYSICAL EXAM   height is 6' 5" (1.956 m) and weight is 113.4 kg (250 lb).   Body mass index is 29.65 kg/m².      All other systems deferred.  GENERAL:  No acute distress  HABITUS: Normal  GAIT: Antalgic  SKIN:  Chronic venous stasis changes with open ulcerations of his bilateral lower extremities, lymphedema    KNEE EXAM:    bilateral:   Effusion: Moderate joint effusion  TTP: Yes over Medial Joint Line and Lateral Joint Line   No crepitus with passive knee ROM  Passive ROM: Extension 5, Flexion 115  No pain with manipulation of patella  Stable to varus/valgus stress. No increased laxity to anterior/posterior drawer testing  negative Johan's test  Yes pain with IR > ER rotation of the hip on right, not left  5/5 strength in knee flexion and extension, ankle plantarflexion and dorsiflexion  Neurovascular Status: Sensation intact to light touch in Sural, Saphenous, SPN, DPN, Tibial nerve distribution  2+ pulse DP/PT, normal capillary refill, foot has normal warmth    DATA:  Diagnostic tests reviewed for today's visit:     4v of the knee reveal Mild degenerative changes of the Medial, Lateral, and Patellofemoral compartment. There is evidence of advanced osteoarthritis changes with Joint space narrowing. The limb is in netural alignment. The patella is tracking midline.    The hip radiographs appears to have end-stage arthritis of his bilateral hips these images were obtained over 2 years ago.  Left worse than right.    "

## 2023-12-20 ENCOUNTER — OFFICE VISIT (OUTPATIENT)
Dept: WOUND CARE | Facility: CLINIC | Age: 68
End: 2023-12-20
Payer: MEDICARE

## 2023-12-20 ENCOUNTER — HOSPITAL ENCOUNTER (OUTPATIENT)
Dept: VASCULAR SURGERY | Facility: CLINIC | Age: 68
Discharge: HOME | End: 2023-12-20
Payer: MEDICARE

## 2023-12-20 VITALS
TEMPERATURE: 97 F | DIASTOLIC BLOOD PRESSURE: 61 MMHG | HEART RATE: 60 BPM | HEIGHT: 77 IN | SYSTOLIC BLOOD PRESSURE: 93 MMHG | BODY MASS INDEX: 29.86 KG/M2 | WEIGHT: 252.88 LBS

## 2023-12-20 DIAGNOSIS — R60.0 BILATERAL LEG EDEMA: ICD-10-CM

## 2023-12-20 DIAGNOSIS — I73.9 PAD (PERIPHERAL ARTERY DISEASE): ICD-10-CM

## 2023-12-20 DIAGNOSIS — I42.8 NICM (NONISCHEMIC CARDIOMYOPATHY): ICD-10-CM

## 2023-12-20 DIAGNOSIS — I73.9 PERIPHERAL VASCULAR DISEASE, UNSPECIFIED: ICD-10-CM

## 2023-12-20 DIAGNOSIS — S81.801A MULTIPLE OPEN WOUNDS OF RIGHT LOWER EXTREMITY, INITIAL ENCOUNTER: ICD-10-CM

## 2023-12-20 DIAGNOSIS — S81.801A MULTIPLE OPEN WOUNDS OF RIGHT LOWER EXTREMITY, INITIAL ENCOUNTER: Primary | ICD-10-CM

## 2023-12-20 DIAGNOSIS — N18.31 ACUTE RENAL FAILURE SUPERIMPOSED ON STAGE 3A CHRONIC KIDNEY DISEASE, UNSPECIFIED ACUTE RENAL FAILURE TYPE: ICD-10-CM

## 2023-12-20 DIAGNOSIS — I50.42 CHRONIC COMBINED SYSTOLIC AND DIASTOLIC CONGESTIVE HEART FAILURE: ICD-10-CM

## 2023-12-20 DIAGNOSIS — I73.9 PVD (PERIPHERAL VASCULAR DISEASE): Primary | ICD-10-CM

## 2023-12-20 DIAGNOSIS — N17.9 ACUTE RENAL FAILURE SUPERIMPOSED ON STAGE 3A CHRONIC KIDNEY DISEASE, UNSPECIFIED ACUTE RENAL FAILURE TYPE: ICD-10-CM

## 2023-12-20 PROCEDURE — 3078F PR MOST RECENT DIASTOLIC BLOOD PRESSURE < 80 MM HG: ICD-10-PCS | Mod: CPTII,S$GLB,,

## 2023-12-20 PROCEDURE — 3008F BODY MASS INDEX DOCD: CPT | Mod: CPTII,S$GLB,,

## 2023-12-20 PROCEDURE — 3074F PR MOST RECENT SYSTOLIC BLOOD PRESSURE < 130 MM HG: ICD-10-PCS | Mod: CPTII,S$GLB,,

## 2023-12-20 PROCEDURE — 1101F PR PT FALLS ASSESS DOC 0-1 FALLS W/OUT INJ PAST YR: ICD-10-PCS | Mod: CPTII,S$GLB,,

## 2023-12-20 PROCEDURE — 4010F PR ACE/ARB THEARPY RXD/TAKEN: ICD-10-PCS | Mod: CPTII,S$GLB,,

## 2023-12-20 PROCEDURE — 29581 APPL MULTLAYER CMPRN SYS LEG: CPT | Mod: RT,S$GLB,,

## 2023-12-20 PROCEDURE — 1125F AMNT PAIN NOTED PAIN PRSNT: CPT | Mod: CPTII,S$GLB,,

## 2023-12-20 PROCEDURE — 1101F PT FALLS ASSESS-DOCD LE1/YR: CPT | Mod: CPTII,S$GLB,,

## 2023-12-20 PROCEDURE — 3044F PR MOST RECENT HEMOGLOBIN A1C LEVEL <7.0%: ICD-10-PCS | Mod: CPTII,S$GLB,,

## 2023-12-20 PROCEDURE — 3288F PR FALLS RISK ASSESSMENT DOCUMENTED: ICD-10-PCS | Mod: CPTII,S$GLB,,

## 2023-12-20 PROCEDURE — 1125F PR PAIN SEVERITY QUANTIFIED, PAIN PRESENT: ICD-10-PCS | Mod: CPTII,S$GLB,,

## 2023-12-20 PROCEDURE — 99999 PR PBB SHADOW E&M-EST. PATIENT-LVL IV: ICD-10-PCS | Mod: PBBFAC,,,

## 2023-12-20 PROCEDURE — 3074F SYST BP LT 130 MM HG: CPT | Mod: CPTII,S$GLB,,

## 2023-12-20 PROCEDURE — 93923 PR NON-INVASIVE PHYSIOLOGIC STUDY EXTREMITY 3 LEVELS: ICD-10-PCS | Mod: S$GLB,,, | Performed by: SURGERY

## 2023-12-20 PROCEDURE — 3078F DIAST BP <80 MM HG: CPT | Mod: CPTII,S$GLB,,

## 2023-12-20 PROCEDURE — 3288F FALL RISK ASSESSMENT DOCD: CPT | Mod: CPTII,S$GLB,,

## 2023-12-20 PROCEDURE — 99999 PR PBB SHADOW E&M-EST. PATIENT-LVL IV: CPT | Mod: PBBFAC,,,

## 2023-12-20 PROCEDURE — 29581 PR APPL MLT-LAYER VENOUS WOUND COMPRESS BELOW KNEE: ICD-10-PCS | Mod: RT,S$GLB,,

## 2023-12-20 PROCEDURE — 1159F PR MEDICATION LIST DOCUMENTED IN MEDICAL RECORD: ICD-10-PCS | Mod: CPTII,S$GLB,,

## 2023-12-20 PROCEDURE — 4010F ACE/ARB THERAPY RXD/TAKEN: CPT | Mod: CPTII,S$GLB,,

## 2023-12-20 PROCEDURE — 99213 OFFICE O/P EST LOW 20 MIN: CPT | Mod: 25,S$GLB,,

## 2023-12-20 PROCEDURE — 3044F HG A1C LEVEL LT 7.0%: CPT | Mod: CPTII,S$GLB,,

## 2023-12-20 PROCEDURE — 3008F PR BODY MASS INDEX (BMI) DOCUMENTED: ICD-10-PCS | Mod: CPTII,S$GLB,,

## 2023-12-20 PROCEDURE — 99213 PR OFFICE/OUTPT VISIT, EST, LEVL III, 20-29 MIN: ICD-10-PCS | Mod: 25,S$GLB,,

## 2023-12-20 PROCEDURE — 1159F MED LIST DOCD IN RCRD: CPT | Mod: CPTII,S$GLB,,

## 2023-12-20 PROCEDURE — 93923 UPR/LXTR ART STDY 3+ LVLS: CPT | Mod: S$GLB,,, | Performed by: SURGERY

## 2023-12-20 NOTE — PROGRESS NOTES
Subjective:       Patient ID: Papito Bhakta is a 68 y.o. male.    Chief Complaint: Wound Check      Patient presents with his daughter for a re-evaluation of right lower extremity wounds. He reports that he had a bee sting to his right medial leg in 1986. Pt reports that he has had wounds off and on to his medial right lower extremity. Other providers have told him it is from his leg swelling, but patient reports it is from the bee sting. Pt notes a history of chronic leg swelling- right > left. He reports these wounds started a few weeks ago. Pt notes that this is the first time he has a right lateral lower extremity wound. Pt previously wore compression stockings regularly but has since stopped. Pt has been placing triple antibiotic cream to his wounds and leaving them open to air. When he is walking around his house, pt will cover his wounds with a shopping bag. He tried to cover his wounds with a sock, but at the time his wounds were draining. He reports his wounds no longer drain. Pt reports smoking about 1 ppd for about 20 years. Admits compliance with lasix. Pt saw ID on 12/6/23 who prescribed doxycyline as needed and referred him to wound care. He went went on a cruise last week. He removed the wound dressings on Sunday. Patient noted the dressing was completely saturated. He then left his wound open to air. Denies fever, chills, erythema, warmth, purulent drainage, or pain.    12/20/23 TBIs and ABIs:  Rt YONATHAN (rt dors ped A BP / max brach A BP) 1.26   Rt TBI (rt toe BP / max brach A BP)    0.84   Lt YONATHAN (lt post tibial A BP / max brach A BP)  1.09   Lt TBI (lt toe BP / max brach A BP)    0.15   Rt toe BP - PPG    77 mmHg   Rt toe digit waveform: critical   Lt toe BP - PPG    14 mmHg   Lt toe digit waveform: critical   Irregular cardiac rhythm can significantly affect accuracy of pressure measurements as well as result in artifact that renders   problematic PVR waveform interpretation.   Right Leg: Segmental  pressures and PVR waveforms suggest minimal peripheral arterial occlusive disease.   Rt lower digits: Toe PPG waveforms as described above. - TBI of 0.84 with a Great toe pressure of 77 mmHg is noted.   Left Leg: Segmental pressures and PVR waveforms suggest minimal peripheral arterial occlusive disease.   Lt lower digits: Toe PPG waveforms as described above. - TBI of 0.15 with a Great toe pressure of 14 mmHg is noted.       12/6/23 venous ultrasound:  No evidence of deep venous thrombosis in either lower extremity.        Review of Systems   Constitutional:  Negative for activity change, chills, diaphoresis, fatigue and fever.   Respiratory:  Negative for apnea, chest tightness and shortness of breath.    Cardiovascular:  Positive for leg swelling. Negative for chest pain and palpitations.   Musculoskeletal:  Negative for gait problem and joint swelling.   Skin:  Positive for wound. Negative for color change, pallor and rash.   Neurological:  Negative for syncope, weakness and numbness.   Psychiatric/Behavioral:  Negative for agitation. The patient is not nervous/anxious.    All other systems reviewed and are negative.      Objective:      Physical Exam  Vitals reviewed.   Constitutional:       General: He is not in acute distress.     Appearance: Normal appearance.   Cardiovascular:      Pulses:           Dorsalis pedis pulses are detected w/ Doppler on the right side.        Posterior tibial pulses are 0 on the right side.   Pulmonary:      Effort: No respiratory distress.   Musculoskeletal:         General: No swelling.      Right lower leg: Edema present.      Left lower leg: Edema present.   Skin:     General: Skin is warm and dry.      Capillary Refill: Capillary refill takes more than 3 seconds.      Findings: Wound present. No erythema.          Neurological:      General: No focal deficit present.      Mental Status: He is alert and oriented to person, place, and time.   Psychiatric:         Mood and  Affect: Mood normal.         Behavior: Behavior normal.         Thought Content: Thought content normal.         Judgment: Judgment normal.         Assessment:       1. Multiple open wounds of right lower extremity, initial encounter    2. Bilateral leg edema    3. Chronic combined systolic and diastolic congestive heart failure    4. NICM (nonischemic cardiomyopathy)    5. Peripheral vascular disease, unspecified    6. Acute renal failure superimposed on stage 3a chronic kidney disease, unspecified acute renal failure type    7. PAD (peripheral artery disease)           Altered Skin Integrity Right lower;medial Leg (Active)       Altered Skin Integrity Present on Admission - Did Patient arrive to the hospital with altered skin?:    Side: Right   Orientation: lower;medial   Location: Leg   Wound Number:    Is this injury device related?:    Primary Wound Type:    Description of Altered Skin Integrity:    Ankle-Brachial Index:    Pulses:    Removal Indication and Assessment:    Wound Outcome:    (Retired) Wound Length (cm):    (Retired) Wound Width (cm):    (Retired) Depth (cm):    Wound Description (Comments):    Removal Indications:    Wound Image   12/20/23 1243   Dressing Appearance Open to air 12/20/23 1243   Yellow (%), Wound Tissue Color 100 % 12/20/23 1243   Periwound Area Edematous;Intact;Hemosiderin Staining 12/20/23 1243   Wound Length (cm) 8.9 cm 12/20/23 1243   Wound Width (cm) 6.4 cm 12/20/23 1243   Wound Depth (cm) 0.1 cm 12/20/23 1243   Wound Volume (cm^3) 5.696 cm^3 12/20/23 1243   Wound Surface Area (cm^2) 56.96 cm^2 12/20/23 1243   Care Cleansed with:;Soap and water 12/20/23 1243   Dressing Applied;Compression wrap;Other (comment);Absorptive Pad 12/20/23 1243   Periwound Care Skin barrier film applied 12/20/23 1243   Compression Two layer compression 12/20/23 1243            Altered Skin Integrity Right lower;lateral Leg (Active)       Altered Skin Integrity Present on Admission - Did Patient  arrive to the hospital with altered skin?:    Side: Right   Orientation: lower;lateral   Location: Leg   Wound Number:    Is this injury device related?:    Primary Wound Type:    Description of Altered Skin Integrity:    Ankle-Brachial Index:    Pulses:    Removal Indication and Assessment:    Wound Outcome:    (Retired) Wound Length (cm):    (Retired) Wound Width (cm):    (Retired) Depth (cm):    Wound Description (Comments):    Removal Indications:    Wound Image   12/20/23 1243   Dressing Appearance Open to air 12/20/23 1243   Yellow (%), Wound Tissue Color 100 % 12/20/23 1243   Periwound Area Intact;Edematous;Hemosiderin Staining 12/20/23 1243   Wound Length (cm) 3 cm 12/20/23 1243   Wound Width (cm) 1.5 cm 12/20/23 1243   Wound Depth (cm) 0.1 cm 12/20/23 1243   Wound Volume (cm^3) 0.45 cm^3 12/20/23 1243   Wound Surface Area (cm^2) 4.5 cm^2 12/20/23 1243   Care Cleansed with:;Soap and water 12/20/23 1243   Dressing Applied;Compression wrap;Absorptive Pad;Other (comment) 12/20/23 1243   Periwound Care Skin barrier film applied 12/20/23 1243   Compression Two layer compression 12/20/23 1243       Papito was seen in the clinic room and placed in the supine position on the treatment table.  The area was cleansed with Easi-clense sponges and dried thoroughly. No odor, erythema, or warmth noted. Deferred sharp debridement.     Plan of Care: Drawtex to wound beds, mextra pads, and 2 layer calamine coflex compression wrap to bilateral lower extremities. The patient's foot was positioned at a 90 degree angle.  A compression wrap was applied using a spiral technique avoiding creases or folds.  The wrap was started behind the first metatarsal and ended below the tibial tubercle of the knee.  There was overlap of each turn half the width of the previous turn.            Plan:       Orders Placed This Encounter   Procedures    Ambulatory referral/consult to Interventional Cardiology     Standing Status:   Future      Standing Expiration Date:   1/20/2025     Referral Priority:   Routine     Referral Type:   Consultation     Referral Reason:   Specialty Services Required     Referred to Provider:   Reggie Mcocy MD PhD     Requested Specialty:   Interventional Cardiology     Number of Visits Requested:   1       Right lower extremity was dressed as detailed above.  Patient was instructed to not get the dressings wet and to use cast covers for showering.  Should the dressing become wet, he is to remove it, place a moist dressing over the wound, cover with gauze and roll gauze and to secure bandages.  He should then notify this office as soon as possible to have a new dressing applied.  Instructed patient to remove wrap if he notices tingling, pain, swelling, or coldness to his right lower extremity or if his toes become white, blue, or cold.    Discussed ABIs and TBIs- able to utilize compression wrap. Referral to vascular medicine placed to monitor PAD.    Discussed nutrition and the role of protein in wound healing with the patient. Instructed patient to optimize protein for wound healing.     Discussed bilateral lower extremity edema with patient. Instructed patient to elevate legs whenever sedentary, follow a low sodium diet, and to take lasix as prescribed.    Instructed patient to not leave wounds open to air.     Written and verbal instructions given to patient  RTC in 1 week     Deborah Nicole PA-C

## 2023-12-21 NOTE — ED PROVIDER NOTES
Encounter Date: 12/6/2023       History     Chief Complaint   Patient presents with    Leg Pain     Put pain ointment on r upper leg,      60-year-old male who presents with right leg swelling and concern for possible blood clot.  Patient has a history of heart failure and is compliant with his medications.  He is not sure if he has having an allergic response or not to the creams the put in his leg.  No nausea, vomiting, diarrhea, shortness a breath. No unilateral leg swelling, hemoptysis, recent surgery or trauma within the last 4 weeks requiring general anesthesia, prior pulmonary embolism or deep venous thrombosis, malignancy with treatment within the last 6 months, oral contraceptive, hormone replacement, or estrogenic hormone use. No diaphoresis, nausea, vomiting, radiation of pain or presence of numbness to the neck, jaw, or upper extremity. No exertional or syncopal components. No recent endoscopic instrumentation, repeated retching with subsequent severe chest pain, ingestion of foreign body, or known esophageal tumor or malignancy. No abrupt onset, severe chest, back, or abdominal pain with ripping or tearing sensation. No new diplopia, headache, confusion, numbness or decreased strength in an extremity.        Review of patient's allergies indicates:   Allergen Reactions    Ramipril      Leg swelling and gynecomastia     Losartan Rash     Past Medical History:   Diagnosis Date    RIGOBERTO (acute kidney injury) 10/7/2020    Anticoagulant long-term use     Aspirin    CHF (congestive heart failure)     Hyperlipidemia     Hypertension     NICM (nonischemic cardiomyopathy) 6/16/2020    Obesity     AMELIA (obstructive sleep apnea) 1/7/2022    Peripheral vascular disease, unspecified 2/1/2021     Past Surgical History:   Procedure Laterality Date    INSERTION OF BIVENTRICULAR IMPLANTABLE CARDIOVERTER-DEFIBRILLATOR (ICD) N/A 02/13/2019    Procedure: INSERTION, ICD, BIVENTRICULAR;  Surgeon: Shailesh Eng MD;   Location: [facility] CATH LAB;  Service: Cardiology;  Laterality: N/A;  RN PRE OP 2-6-19  1ST CASE PER  RADHA. NOTIFIED RADHA THAT ANESTHESIA IS NOT PITO FOR 1ST CASE START-LO    INSERTION OF BIVENTRICULAR IMPLANTABLE CARDIOVERTER-DEFIBRILLATOR (ICD) N/A 2020    Procedure: INSERTION, ICD, BIVENTRICULAR;  Surgeon: Jim Kwong MD;  Location: [facility] EP LAB;  Service: Cardiology;  Laterality: N/A;  NICM, CRT-D, SJM,, MAC, DM, 3 Prep*Wearing LifeVest*    oral extraction  2018    TESTICLE SURGERY       Family History   Problem Relation Age of Onset    Epilepsy Mother      Social History     Tobacco Use    Smoking status: Former     Current packs/day: 0.00     Average packs/day: 0.5 packs/day for 40.0 years (20.0 ttl pk-yrs)     Types: Cigarettes, Cigars     Start date:      Quit date:      Years since quittin.9     Passive exposure: Current    Smokeless tobacco: Never   Substance Use Topics    Alcohol use: Yes     Comment: once a month beer or liquor    Drug use: No     Review of Systems    Physical Exam     Initial Vitals [23 0906]   BP Pulse Resp Temp SpO2   (!) 89/61 60 20 97.4 °F (36.3 °C) 97 %      MAP       --         Physical Exam    Nursing note and vitals reviewed.  Constitutional: He appears well-developed and well-nourished.   HENT:   Head: Normocephalic and atraumatic.   Eyes: EOM are normal. Pupils are equal, round, and reactive to light.   Neck: Neck supple. No JVD present.   Normal range of motion.  Cardiovascular:  Normal rate and regular rhythm.           Pulmonary/Chest: Breath sounds normal. No stridor. No respiratory distress.   Abdominal: Abdomen is soft. There is no abdominal tenderness.   Musculoskeletal:         General: Edema present. No tenderness. Normal range of motion.      Cervical back: Normal range of motion and neck supple.      Comments: Right leg is mildly swollen when compared to the left.  There is cream to the right knee with dark discoloration around it that is  likely hyperpigmentation.  No signs for allergic response.     Neurological: He is alert and oriented to person, place, and time.   Skin: Skin is warm and dry. Capillary refill takes less than 2 seconds.   Psychiatric: He has a normal mood and affect. Thought content normal.         ED Course   Procedures  Labs Reviewed   CBC W/ AUTO DIFFERENTIAL - Abnormal; Notable for the following components:       Result Value    Hemoglobin 13.2 (*)     MCV 73 (*)     MCH 23.1 (*)     MCHC 31.6 (*)     RDW 22.2 (*)     All other components within normal limits   COMPREHENSIVE METABOLIC PANEL - Abnormal; Notable for the following components:    Chloride 94 (*)     BUN 39 (*)     Creatinine 2.0 (*)     Albumin 3.0 (*)     Total Bilirubin 4.0 (*)     eGFR 35.7 (*)     All other components within normal limits   URINALYSIS, REFLEX TO URINE CULTURE - Abnormal; Notable for the following components:    Appearance, UA Hazy (*)     Protein, UA 1+ (*)     Glucose, UA 3+ (*)     All other components within normal limits    Narrative:     Specimen Source->Urine   B-TYPE NATRIURETIC PEPTIDE - Abnormal; Notable for the following components:    BNP 1,258 (*)     All other components within normal limits   URINALYSIS MICROSCOPIC - Abnormal; Notable for the following components:    WBC, UA 6 (*)     All other components within normal limits    Narrative:     Specimen Source->Urine   ISTAT PROCEDURE - Abnormal; Notable for the following components:    POC PCO2 62.2 (*)     POC PO2 16 (*)     POC HCO3 37.5 (*)     POC BE 12 (*)     POC TCO2 39 (*)     All other components within normal limits   ISTAT LACTATE - Abnormal; Notable for the following components:    POC Lactate 2.40 (*)     All other components within normal limits   CULTURE, BLOOD    Narrative:     Aerobic and anaerobic   CULTURE, BLOOD    Narrative:     Aerobic and anaerobic   PROTIME-INR   LIPASE   TROPONIN I   MAGNESIUM   PHOSPHORUS   APTT   ISTAT LACTATE        ECG Results               EKG 12-lead (Final result)  Result time 12/06/23 10:13:59      Final result by Interface, Lab In [facility] (12/06/23 10:13:59)                   Narrative:    Test Reason : I95.9,    Vent. Rate : 060 BPM     Atrial Rate : 060 BPM     P-R Int : 248 ms          QRS Dur : 214 ms      QT Int : 570 ms       P-R-T Axes : 004 -64 111 degrees     QTc Int : 570 ms    Dual Pacer  Abnormal ECG  When compared with ECG of 10-NOV-2023 17:16,  A pacing now noted  Confirmed by Pasquale SARMIENTO MD ([id number]) on 12/6/2023 10:13:52 AM    Referred By: [email or web address]   SELF           Confirmed By:Pasquale SARMIENTO MD                                  Imaging Results              US Lower Extremity Veins Bilateral (Final result)  Result time 12/06/23 20:47:45      Final result by Keyshawn Ledezma MD (12/06/23 20:47:45)                   Impression:      No evidence of deep venous thrombosis in either lower extremity.    Bilateral inguinal lymphadenopathy, follow-up recommended.    Primarily hypoechoic collection superior to the right knee.  This is in a region of subcutaneous edema and superficial skin bruising.  Findings may reflect hematoma in the setting of trauma however correlation and follow-up is advised.  Infected collection not excluded though no secondary findings to support the same.      Electronically signed by: Keyshawn Ledezma MD  Date:    12/06/2023  Time:    20:47               Narrative:    EXAMINATION:  US LOWER EXTREMITY VEINS BILATERAL    CLINICAL HISTORY:  Other specified soft tissue disorders    TECHNIQUE:  Duplex and color flow Doppler and dynamic compression was performed of the bilateral lower extremity veins was performed.    COMPARISON:  None    FINDINGS:  Duplex and color flow Doppler evaluation does not reveal any evidence of acute venous thrombosis in the common femoral, superficial femoral, greater saphenous, popliteal, peroneal, anterior tibial and posterior tibial veins bilaterally.  There is no reflux to suggest valvular  incompetence.    Superior to the right knee, there is a primarily hypoechoic collection measuring approximately 4.3 x 1.2 x 3.1 cm.  There is overlying subcutaneous edema.  There is right inguinal lymphadenopathy.  There is left inguinal lymphadenopathy.                                       X-Ray Chest AP Portable (Final result)  Result time 12/06/23 11:43:59      Final result by Jm Roman MD (12/06/23 11:43:59)                   Impression:      Continued demonstration of marked cardiomegaly, but allowing for differences in projection and a poorer inspiratory depth level on the current examination, there has been no significant detrimental interval change in the appearance of the chest since 11/10/2023 at 17:02.      Electronically signed by: Jm Roman MD  Date:    12/06/2023  Time:    11:43               Narrative:    EXAMINATION:  XR CHEST AP PORTABLE    CLINICAL HISTORY:  Sepsis;    TECHNIQUE:  One view    COMPARISON:  Comparison is made to 11/10/2023 at 17:02.    FINDINGS:  Cardioverter/defibrillator is again observed.  Cardiomediastinal silhouette is again seen to be markedly prominent, representing cardiomegaly and/or pericardial fluid, but the degree of cardiomegaly and the appearance of the cardiomediastinal silhouette and pulmonary vascularity demonstrate no detrimental change since the examination referenced above.  Lung zones are stable, with no significant airspace consolidation or volume loss seen.  No pleural fluid of any substantial volume is noted on either side.  No pneumothorax.                                       Medications   acetaminophen tablet 650 mg (650 mg Oral Given 12/6/23 1923)     Medical Decision Making  Hemodynamically stable. Afebrile. Phonating and protecting the airway spontaneously. No clinical evidence for cardiovascular instability or impending airway compromise. Examination as above. Additional historians include family at bedside. Prior medical records reviewed.   Patient has history of systolic heart failure and diabetes. Current co-morbidities considered that will impact clinical decision making include as above.    Plan:  Cardiac labs, ultrasound, blood gas.      Amount and/or Complexity of Data Reviewed  Labs: ordered.  Radiology: ordered.    Risk  OTC drugs.               ED Course as of 12/20/23 2250   Wed Dec 06, 2023   1500 Labs reviewed. CBC negative. CMP with mild RIGOBERTO from baseline, will give gentle fluids given his hx of LVEF of 15%. Gas reviewed. CXR reviewed. Discussed with patient. He does not want to stay in the hospital at all. US pending. Will sign out to follow up on imaging.  [BG]      ED Course User Index  [BG] Emiliano Saavedra MD                           Clinical Impression:  Final diagnoses:  [I95.9] Hypotension  [M79.89] Leg swelling  [M79.604] Right leg pain (Primary)          ED Disposition Condition    Discharge Stable          ED Prescriptions    None       Follow-up Information       Follow up With Specialties Details Why Contact Info Additional Information    D[facility]ry nessa - Wound Care [facility] Schedule an appointment as soon as possible for a visit in 1 week  [address]  [facility] [address] [phone] Main Building, 5th Floor Please park in [facility] and use Clinic elevator    Brynn To MD Family Medicine Schedule an appointment as soon as possible for a visit in 1 week  [address] [facility]  [place] [address]  [phone]                Emiliano Saavedra MD  12/20/23 2251

## 2023-12-21 NOTE — ASSESSMENT & PLAN NOTE
--appreciate cardiology assistance  -- Dobutamine weaned to off. Re-introducing GDMT today..  -- Will need to discuss timing of BIV placement.      [FreeTextEntry1] : # Hypertrophic Cardiomyopathy s/p AICD for primary prevention  - H/O extensive myocardial scar, confirmed hypertrophic cardiomyopathy on MRI, and non-sustained VT.  - Device interrogated and reprogrammed as described in procedure. Device function normal. All parameters stable. - Optivol below threshold. - Patient with junctional tachycardia in office today. Differential includes AVNRT. I recommended that patient go to emergency room for treatment as he is not feeling well but he refuses. - Patient seen and examined with Dr. Sarabia. Offered to attempt to treat rhythm using therapy in device. Explained to patient that he may receive ICD shock if rhythm degrades to VT. He understands and would like to proceed. - Ramp therapy successful with immediate relief of symptoms. Reviewed vagal maneuvers with the patient; blowing into a straw. bearing down like having a BM and ice to face. Information printed from website as well regarding vagal maneuvers and given to patient. - Discussed role of EPS / ablation to treat arrhythmia. Patient would rather defer for now. We discussed that should this persist, then ablation is strongly recommended. - VT monitor zone added and therapy zone changed as above.    - Continue Toprol XL 25 mg BID  - He is on remote and transmitting   # Hypertension  - Patient's pressure is well-controlled.  - Continue Amlodipine 10 mg once a day.  - Continue Lisinopril 40 mg once a day.  - low sodium/sat fat diet  - F/U with cards as needed    RTO as scheduled in September, sooner if arrhythmia recurs.

## 2023-12-22 NOTE — ASSESSMENT & PLAN NOTE
Faxed Physician's Verification Statement to Nicholas Frazier on 12/21/2023 to Atten: Hiral Badillo to Fax number [phone] per providers order. Pt mother has been made aware that the paperwork has been faxed.   Severe biventricular dysfunction  Previously had refused biventricular ICD  Will benefit from low-dose inotrope

## 2023-12-28 ENCOUNTER — OFFICE VISIT (OUTPATIENT)
Dept: WOUND CARE | Facility: CLINIC | Age: 68
End: 2023-12-28
Payer: MEDICARE

## 2023-12-28 ENCOUNTER — TELEPHONE (OUTPATIENT)
Dept: FAMILY MEDICINE | Facility: CLINIC | Age: 68
End: 2023-12-28
Payer: MEDICARE

## 2023-12-28 VITALS — HEART RATE: 77 BPM | SYSTOLIC BLOOD PRESSURE: 106 MMHG | DIASTOLIC BLOOD PRESSURE: 73 MMHG

## 2023-12-28 DIAGNOSIS — N17.9 ACUTE RENAL FAILURE SUPERIMPOSED ON STAGE 3A CHRONIC KIDNEY DISEASE, UNSPECIFIED ACUTE RENAL FAILURE TYPE: ICD-10-CM

## 2023-12-28 DIAGNOSIS — I73.9 PAD (PERIPHERAL ARTERY DISEASE): ICD-10-CM

## 2023-12-28 DIAGNOSIS — N18.31 ACUTE RENAL FAILURE SUPERIMPOSED ON STAGE 3A CHRONIC KIDNEY DISEASE, UNSPECIFIED ACUTE RENAL FAILURE TYPE: ICD-10-CM

## 2023-12-28 DIAGNOSIS — S81.801D MULTIPLE OPEN WOUNDS OF RIGHT LOWER EXTREMITY, SUBSEQUENT ENCOUNTER: Primary | ICD-10-CM

## 2023-12-28 DIAGNOSIS — I42.8 NICM (NONISCHEMIC CARDIOMYOPATHY): ICD-10-CM

## 2023-12-28 DIAGNOSIS — R19.7 DIARRHEA, UNSPECIFIED TYPE: ICD-10-CM

## 2023-12-28 DIAGNOSIS — R11.10 VOMITING, UNSPECIFIED VOMITING TYPE, UNSPECIFIED WHETHER NAUSEA PRESENT: ICD-10-CM

## 2023-12-28 DIAGNOSIS — I73.9 PERIPHERAL VASCULAR DISEASE, UNSPECIFIED: ICD-10-CM

## 2023-12-28 DIAGNOSIS — I50.42 CHRONIC COMBINED SYSTOLIC AND DIASTOLIC CONGESTIVE HEART FAILURE: ICD-10-CM

## 2023-12-28 DIAGNOSIS — R60.0 BILATERAL LEG EDEMA: ICD-10-CM

## 2023-12-28 PROCEDURE — 99999 PR PBB SHADOW E&M-EST. PATIENT-LVL III: ICD-10-PCS | Mod: PBBFAC,,,

## 2023-12-28 PROCEDURE — 1125F AMNT PAIN NOTED PAIN PRSNT: CPT | Mod: CPTII,S$GLB,,

## 2023-12-28 PROCEDURE — 3078F PR MOST RECENT DIASTOLIC BLOOD PRESSURE < 80 MM HG: ICD-10-PCS | Mod: CPTII,S$GLB,,

## 2023-12-28 PROCEDURE — 4010F PR ACE/ARB THEARPY RXD/TAKEN: ICD-10-PCS | Mod: CPTII,S$GLB,,

## 2023-12-28 PROCEDURE — 29581 PR APPL MLT-LAYER VENOUS WOUND COMPRESS BELOW KNEE: ICD-10-PCS | Mod: RT,S$GLB,,

## 2023-12-28 PROCEDURE — 3074F SYST BP LT 130 MM HG: CPT | Mod: CPTII,S$GLB,,

## 2023-12-28 PROCEDURE — 3078F DIAST BP <80 MM HG: CPT | Mod: CPTII,S$GLB,,

## 2023-12-28 PROCEDURE — 99215 OFFICE O/P EST HI 40 MIN: CPT | Mod: 25,S$GLB,,

## 2023-12-28 PROCEDURE — 3044F HG A1C LEVEL LT 7.0%: CPT | Mod: CPTII,S$GLB,,

## 2023-12-28 PROCEDURE — 3044F PR MOST RECENT HEMOGLOBIN A1C LEVEL <7.0%: ICD-10-PCS | Mod: CPTII,S$GLB,,

## 2023-12-28 PROCEDURE — 29581 APPL MULTLAYER CMPRN SYS LEG: CPT | Mod: RT,S$GLB,,

## 2023-12-28 PROCEDURE — 1125F PR PAIN SEVERITY QUANTIFIED, PAIN PRESENT: ICD-10-PCS | Mod: CPTII,S$GLB,,

## 2023-12-28 PROCEDURE — 1159F MED LIST DOCD IN RCRD: CPT | Mod: CPTII,S$GLB,,

## 2023-12-28 PROCEDURE — 1159F PR MEDICATION LIST DOCUMENTED IN MEDICAL RECORD: ICD-10-PCS | Mod: CPTII,S$GLB,,

## 2023-12-28 PROCEDURE — 4010F ACE/ARB THERAPY RXD/TAKEN: CPT | Mod: CPTII,S$GLB,,

## 2023-12-28 PROCEDURE — 99999 PR PBB SHADOW E&M-EST. PATIENT-LVL III: CPT | Mod: PBBFAC,,,

## 2023-12-28 PROCEDURE — 99215 PR OFFICE/OUTPT VISIT, EST, LEVL V, 40-54 MIN: ICD-10-PCS | Mod: 25,S$GLB,,

## 2023-12-28 PROCEDURE — 3074F PR MOST RECENT SYSTOLIC BLOOD PRESSURE < 130 MM HG: ICD-10-PCS | Mod: CPTII,S$GLB,,

## 2023-12-28 NOTE — PROGRESS NOTES
Subjective:       Patient ID: Papito Bhakta is a 68 y.o. male.    Chief Complaint: Wound Check        Patient presents with his daughter for a re-evaluation of right lower extremity wounds. He reports that he had a bee sting to his right medial leg in 1986. Pt reports that he has had wounds off and on to his medial right lower extremity. Other providers have told him it is from his leg swelling, but patient reports it is from the bee sting. Pt notes a history of chronic leg swelling- right > left. He reports these wounds started a few weeks ago. Pt notes that this is the first time he has a right lateral lower extremity wound. Pt previously wore compression stockings regularly but has since stopped. Pt has been placing triple antibiotic cream to his wounds and leaving them open to air. When he is walking around his house, pt will cover his wounds with a shopping bag. He tried to cover his wounds with a sock, but at the time his wounds were draining. He reported his wounds no longer drain. Pt reports smoking about 1 ppd for about 20 years. Admits compliance with lasix. Pt saw ID on 12/6/23 who prescribed doxycyline as needed and referred him to wound care. He reports removing the compression wrap on 12/26 because it became too tight. He is unsure why he did not contact the office sooner. Denies compliance with leg elevations or following a low sodium or high protein diet. Patient has not been following with cardiology recently- has not found a provider that he trusts yet. Denies fever, chills, erythema, warmth, purulent drainage, or pain.    Pt reports vomiting, diarrhea, and decreased appetite since the end of October. His daughter reports he has only been able to keep down soup. She recently made gumbo, but usually, he eats [place] soup. His daughter reports that is why she brought him to the ED on 11/10 to figure this out, but it was not addressed.         12/20/23 TBIs and ABIs:  Rt YONATHAN (rt dors ped A BP / max  brach A BP) 1.26   Rt TBI (rt toe BP / max brach A BP)    0.84   Lt YONATHAN (lt post tibial A BP / max brach A BP)  1.09   Lt TBI (lt toe BP / max brach A BP)    0.15   Rt toe BP - PPG    77 mmHg   Rt toe digit waveform: critical   Lt toe BP - PPG    14 mmHg   Lt toe digit waveform: critical   Irregular cardiac rhythm can significantly affect accuracy of pressure measurements as well as result in artifact that renders   problematic PVR waveform interpretation.   Right Leg: Segmental pressures and PVR waveforms suggest minimal peripheral arterial occlusive disease.   Rt lower digits: Toe PPG waveforms as described above. - TBI of 0.84 with a Great toe pressure of 77 mmHg is noted.   Left Leg: Segmental pressures and PVR waveforms suggest minimal peripheral arterial occlusive disease.   Lt lower digits: Toe PPG waveforms as described above. - TBI of 0.15 with a Great toe pressure of 14 mmHg is noted.     12/6/23 venous ultrasound:  No evidence of deep venous thrombosis in either lower extremity.    12/6/23 BNP: 1,258    11/20/23 BNP: 1,592    8/25/23 BNP: 525      Review of Systems   Constitutional:  Negative for activity change, chills, diaphoresis, fatigue and fever.   Respiratory:  Negative for apnea, chest tightness and shortness of breath.    Cardiovascular:  Positive for leg swelling. Negative for chest pain and palpitations.   Gastrointestinal:  Positive for diarrhea and vomiting.   Musculoskeletal:  Negative for gait problem and joint swelling.   Skin:  Positive for wound. Negative for color change, pallor and rash.   Neurological:  Negative for syncope, weakness and numbness.   Psychiatric/Behavioral:  Negative for agitation. The patient is not nervous/anxious.    All other systems reviewed and are negative.      Objective:      Physical Exam  Vitals reviewed.   Constitutional:       General: He is not in acute distress.     Appearance: Normal appearance.   Cardiovascular:      Pulses:           Dorsalis pedis  pulses are detected w/ Doppler on the right side.        Posterior tibial pulses are 0 on the right side.   Pulmonary:      Effort: No respiratory distress.   Musculoskeletal:         General: No swelling.      Right lower leg: 3+ Pitting Edema present.      Left lower le+ Pitting Edema present.   Skin:     General: Skin is warm and dry.      Capillary Refill: Capillary refill takes more than 3 seconds.      Findings: Wound present. No erythema.          Neurological:      General: No focal deficit present.      Mental Status: He is alert and oriented to person, place, and time.   Psychiatric:         Mood and Affect: Mood normal.         Behavior: Behavior normal.         Thought Content: Thought content normal.         Judgment: Judgment normal.         Assessment:       1. Multiple open wounds of right lower extremity, subsequent encounter    2. Bilateral leg edema    3. Chronic combined systolic and diastolic congestive heart failure    4. NICM (nonischemic cardiomyopathy)    5. Peripheral vascular disease, unspecified    6. Acute renal failure superimposed on stage 3a chronic kidney disease, unspecified acute renal failure type    7. PAD (peripheral artery disease)    8. Vomiting, unspecified vomiting type, unspecified whether nausea present    9. Diarrhea, unspecified type           Altered Skin Integrity Right lower;medial Leg (Active)       Altered Skin Integrity Present on Admission - Did Patient arrive to the hospital with altered skin?:    Side: Right   Orientation: lower;medial   Location: Leg   Wound Number:    Is this injury device related?:    Primary Wound Type:    Description of Altered Skin Integrity:    Ankle-Brachial Index:    Pulses:    Removal Indication and Assessment:    Wound Outcome:    (Retired) Wound Length (cm):    (Retired) Wound Width (cm):    (Retired) Depth (cm):    Wound Description (Comments):    Removal Indications:    Wound Image   23 1316   Dressing Appearance  Dry;Intact;Clean;Moist drainage;Saturated 12/28/23 1316   Drainage Amount Copious 12/28/23 1316   Drainage Characteristics/Odor Serosanguineous 12/28/23 1316   Yellow (%), Wound Tissue Color 100 % 12/28/23 1316   Periwound Area Intact;Hemosiderin Staining;Edematous 12/28/23 1316   Wound Length (cm) 11.2 cm 12/28/23 1316   Wound Width (cm) 7.3 cm 12/28/23 1316   Wound Depth (cm) 0.1 cm 12/28/23 1316   Wound Volume (cm^3) 8.176 cm^3 12/28/23 1316   Wound Surface Area (cm^2) 81.76 cm^2 12/28/23 1316   Care Cleansed with:;Soap and water 12/28/23 1316   Dressing Applied;Compression wrap;Calcium alginate;Absorptive Pad;Other (comment) 12/28/23 1316   Periwound Care Skin barrier film applied 12/28/23 1316   Compression Two layer compression 12/28/23 1316            Altered Skin Integrity Right lower;lateral Leg (Active)       Altered Skin Integrity Present on Admission - Did Patient arrive to the hospital with altered skin?:    Side: Right   Orientation: lower;lateral   Location: Leg   Wound Number:    Is this injury device related?:    Primary Wound Type:    Description of Altered Skin Integrity:    Ankle-Brachial Index:    Pulses:    Removal Indication and Assessment:    Wound Outcome:    (Retired) Wound Length (cm):    (Retired) Wound Width (cm):    (Retired) Depth (cm):    Wound Description (Comments):    Removal Indications:    Wound Image   12/28/23 1316   Dressing Appearance Dry;Intact;Clean;Moist drainage 12/28/23 1316   Drainage Amount Large 12/28/23 1316   Drainage Characteristics/Odor Serosanguineous 12/28/23 1316   Red (%), Wound Tissue Color 50 % 12/28/23 1316   Yellow (%), Wound Tissue Color 50 % 12/28/23 1316   Periwound Area Intact;Hemosiderin Staining;Edematous 12/28/23 1316   Wound Length (cm) 9.8 cm 12/28/23 1316   Wound Width (cm) 6.4 cm 12/28/23 1316   Wound Depth (cm) 0.1 cm 12/28/23 1316   Wound Volume (cm^3) 6.272 cm^3 12/28/23 1316   Wound Surface Area (cm^2) 62.72 cm^2 12/28/23 1316   Care  Cleansed with:;Soap and water 12/28/23 1316   Dressing Applied;Compression wrap;Calcium alginate;Absorptive Pad;Other (comment) 12/28/23 1316   Periwound Care Skin barrier film applied 12/28/23 1316   Compression Two layer compression 12/28/23 1316       Papito was seen in the clinic room and placed in the supine position on the treatment table.  The area was cleansed with Easi-clense sponges and dried thoroughly. No odor, erythema, or warmth noted. Deferred sharp debridement.     Plan of Care: Drawtex to wound beds, aquacel, mextra pads, and 2 layer calamine coflex compression wrap to bilateral lower extremities. The patient's foot was positioned at a 90 degree angle.  A compression wrap was applied using a spiral technique avoiding creases or folds.  The wrap was started behind the first metatarsal and ended below the tibial tubercle of the knee.  There was overlap of each turn half the width of the previous turn.            Plan:       Orders Placed This Encounter   Procedures    Ambulatory referral/consult to [facility]     Standing Status:   Future     Standing Expiration Date:   1/28/2025     Referral Priority:   Routine     Referral Type:   Consultation     Referral Reason:   Specialty Services Required     Requested Specialty:   Cardiology     Number of Visits Requested:   1     Discussed patient with Vidya Traore PA-C. Heart failure causing worsened leg edema. Patient was recently admitted for uncontrolled heart failure. Referral placed.     Discussed vomiting, diarrhea, and decreased appetitie. Direct messaged PCP- appointment made for 12/29/23. Discussed monitoring the sodium and fluid intake in the canned soups.    Discussed drainage control. Discussed following 2x a week or ordering home health. Pt and daughter opted for 2x a week.    Right lower extremity was dressed as detailed above.  Patient was instructed to not get the dressings wet and to use cast covers for showering.   Should the dressing become wet, he is to remove it, place a moist dressing over the wound, cover with gauze and roll gauze and to secure bandages.  He should then notify this office as soon as possible to have a new dressing applied.  Instructed patient to remove wrap if he notices tingling, pain, swelling, or coldness to his right lower extremity or if his toes become white, blue, or cold.    Discussed ABIs and TBIs- able to utilize compression wrap. Appointment with vascular medicine to monitor PAD on 2/20/24.    Discussed nutrition and the role of protein in wound healing with the patient. Instructed patient to optimize protein for wound healing.     Discussed bilateral lower extremity edema with patient. Instructed patient to elevate legs whenever sedentary, follow a low sodium diet, and to take lasix as prescribed.    Instructed patient to not leave wounds open to air.     Written and verbal instructions given to patient  RTC in 1 week     Deborah Nicole PA-C                 79  minutes of total time spent on the encounter, which includes face to face time and non-face to face time preparing to see the patient (eg, review of tests), Obtaining and/or reviewing separately obtained history, Documenting clinical information in the electronic or other health record, Independently interpreting results (not separately reported) and communicating results to the patient/family/caregiver, or Care coordination (not separately reported).

## 2023-12-28 NOTE — TELEPHONE ENCOUNTER
Please see if we can put him down for appt Jan 8th(urgent)  or Jan 12th (hosf/u) with me; if those days don't work ok for him to see Erick    He has had poor appetite    Brynn To MD

## 2023-12-29 ENCOUNTER — HOSPITAL ENCOUNTER (INPATIENT)
Facility: HOSPITAL | Age: 68
LOS: 16 days | Discharge: HOSPICE | DRG: 291 | End: 2024-01-18
Attending: STUDENT IN AN ORGANIZED HEALTH CARE EDUCATION/TRAINING PROGRAM | Admitting: INTERNAL MEDICINE
Payer: MEDICARE

## 2023-12-29 DIAGNOSIS — R53.81 PHYSICAL DEBILITY: ICD-10-CM

## 2023-12-29 DIAGNOSIS — R07.9 CHEST PAIN: ICD-10-CM

## 2023-12-29 DIAGNOSIS — I10 ESSENTIAL HYPERTENSION: Chronic | ICD-10-CM

## 2023-12-29 DIAGNOSIS — R00.0 WIDE-COMPLEX TACHYCARDIA: ICD-10-CM

## 2023-12-29 DIAGNOSIS — R00.0 TACHYCARDIA: ICD-10-CM

## 2023-12-29 DIAGNOSIS — R06.82 TACHYPNEA: ICD-10-CM

## 2023-12-29 DIAGNOSIS — I50.9 CONGESTIVE HEART FAILURE, UNSPECIFIED HF CHRONICITY, UNSPECIFIED HEART FAILURE TYPE: Primary | ICD-10-CM

## 2023-12-29 DIAGNOSIS — R06.02 SHORTNESS OF BREATH: ICD-10-CM

## 2023-12-29 DIAGNOSIS — I49.9 CARDIAC RHYTHM DISORDER OR DISTURBANCE OR CHANGE: ICD-10-CM

## 2023-12-29 DIAGNOSIS — N18.32 STAGE 3B CHRONIC KIDNEY DISEASE: ICD-10-CM

## 2023-12-29 DIAGNOSIS — R13.10 DYSPHAGIA, UNSPECIFIED TYPE: ICD-10-CM

## 2023-12-29 DIAGNOSIS — R13.19 OTHER DYSPHAGIA: ICD-10-CM

## 2023-12-29 DIAGNOSIS — R11.2 NAUSEA AND VOMITING, UNSPECIFIED VOMITING TYPE: ICD-10-CM

## 2023-12-29 DIAGNOSIS — U07.1 COVID: ICD-10-CM

## 2023-12-29 DIAGNOSIS — R13.19 ESOPHAGEAL DYSPHAGIA: ICD-10-CM

## 2023-12-29 DIAGNOSIS — I50.20 HFREF (HEART FAILURE WITH REDUCED EJECTION FRACTION): ICD-10-CM

## 2023-12-29 PROBLEM — R17 SERUM TOTAL BILIRUBIN ELEVATED: Status: ACTIVE | Noted: 2023-12-29

## 2023-12-29 PROBLEM — H57.02 PUPIL ASYMMETRY: Status: ACTIVE | Noted: 2023-12-29

## 2023-12-29 LAB
ALBUMIN SERPL BCP-MCNC: 3 G/DL (ref 3.5–5.2)
ALP SERPL-CCNC: 91 U/L (ref 55–135)
ALT SERPL W/O P-5'-P-CCNC: 25 U/L (ref 10–44)
ANION GAP SERPL CALC-SCNC: 17 MMOL/L (ref 8–16)
AST SERPL-CCNC: 30 U/L (ref 10–40)
BASOPHILS # BLD AUTO: 0.01 K/UL (ref 0–0.2)
BASOPHILS NFR BLD: 0.2 % (ref 0–1.9)
BILIRUB SERPL-MCNC: 6 MG/DL (ref 0.1–1)
BNP SERPL-MCNC: 3067 PG/ML (ref 0–99)
BUN SERPL-MCNC: 54 MG/DL (ref 8–23)
CALCIUM SERPL-MCNC: 10.4 MG/DL (ref 8.7–10.5)
CHLORIDE SERPL-SCNC: 96 MMOL/L (ref 95–110)
CO2 SERPL-SCNC: 21 MMOL/L (ref 23–29)
CREAT SERPL-MCNC: 2.1 MG/DL (ref 0.5–1.4)
DIFFERENTIAL METHOD BLD: ABNORMAL
EOSINOPHIL # BLD AUTO: 0 K/UL (ref 0–0.5)
EOSINOPHIL NFR BLD: 0 % (ref 0–8)
ERYTHROCYTE [DISTWIDTH] IN BLOOD BY AUTOMATED COUNT: 21.9 % (ref 11.5–14.5)
EST. GFR  (NO RACE VARIABLE): 33.7 ML/MIN/1.73 M^2
GLUCOSE SERPL-MCNC: 93 MG/DL (ref 70–110)
HCT VFR BLD AUTO: 43 % (ref 40–54)
HGB BLD-MCNC: 13.8 G/DL (ref 14–18)
IMM GRANULOCYTES # BLD AUTO: 0.02 K/UL (ref 0–0.04)
IMM GRANULOCYTES NFR BLD AUTO: 0.3 % (ref 0–0.5)
LYMPHOCYTES # BLD AUTO: 1.3 K/UL (ref 1–4.8)
LYMPHOCYTES NFR BLD: 20.8 % (ref 18–48)
MCH RBC QN AUTO: 23.7 PG (ref 27–31)
MCHC RBC AUTO-ENTMCNC: 32.1 G/DL (ref 32–36)
MCV RBC AUTO: 74 FL (ref 82–98)
MONOCYTES # BLD AUTO: 0.6 K/UL (ref 0.3–1)
MONOCYTES NFR BLD: 9.5 % (ref 4–15)
NEUTROPHILS # BLD AUTO: 4.2 K/UL (ref 1.8–7.7)
NEUTROPHILS NFR BLD: 69.2 % (ref 38–73)
NRBC BLD-RTO: 0 /100 WBC
PLATELET # BLD AUTO: 123 K/UL (ref 150–450)
PMV BLD AUTO: ABNORMAL FL (ref 9.2–12.9)
POTASSIUM SERPL-SCNC: 4.8 MMOL/L (ref 3.5–5.1)
PROT SERPL-MCNC: 7.3 G/DL (ref 6–8.4)
RBC # BLD AUTO: 5.83 M/UL (ref 4.6–6.2)
SODIUM SERPL-SCNC: 134 MMOL/L (ref 136–145)
TROPONIN I SERPL DL<=0.01 NG/ML-MCNC: 0.03 NG/ML (ref 0–0.03)
TROPONIN I SERPL DL<=0.01 NG/ML-MCNC: 0.04 NG/ML (ref 0–0.03)
WBC # BLD AUTO: 6.1 K/UL (ref 3.9–12.7)

## 2023-12-29 PROCEDURE — 63600175 PHARM REV CODE 636 W HCPCS

## 2023-12-29 PROCEDURE — 93010 ELECTROCARDIOGRAM REPORT: CPT | Mod: ,,, | Performed by: INTERNAL MEDICINE

## 2023-12-29 PROCEDURE — 93005 ELECTROCARDIOGRAM TRACING: CPT | Performed by: INTERNAL MEDICINE

## 2023-12-29 PROCEDURE — 99285 EMERGENCY DEPT VISIT HI MDM: CPT | Mod: 25

## 2023-12-29 PROCEDURE — 36415 COLL VENOUS BLD VENIPUNCTURE: CPT

## 2023-12-29 PROCEDURE — 63600175 PHARM REV CODE 636 W HCPCS: Performed by: STUDENT IN AN ORGANIZED HEALTH CARE EDUCATION/TRAINING PROGRAM

## 2023-12-29 PROCEDURE — 96374 THER/PROPH/DIAG INJ IV PUSH: CPT

## 2023-12-29 PROCEDURE — 84484 ASSAY OF TROPONIN QUANT: CPT | Performed by: NURSE PRACTITIONER

## 2023-12-29 PROCEDURE — 85025 COMPLETE CBC W/AUTO DIFF WBC: CPT | Performed by: NURSE PRACTITIONER

## 2023-12-29 PROCEDURE — 93005 ELECTROCARDIOGRAM TRACING: CPT

## 2023-12-29 PROCEDURE — G0378 HOSPITAL OBSERVATION PER HR: HCPCS

## 2023-12-29 PROCEDURE — 83880 ASSAY OF NATRIURETIC PEPTIDE: CPT | Performed by: NURSE PRACTITIONER

## 2023-12-29 PROCEDURE — 80053 COMPREHEN METABOLIC PANEL: CPT | Performed by: NURSE PRACTITIONER

## 2023-12-29 PROCEDURE — 84484 ASSAY OF TROPONIN QUANT: CPT | Mod: 91

## 2023-12-29 PROCEDURE — 96372 THER/PROPH/DIAG INJ SC/IM: CPT

## 2023-12-29 PROCEDURE — 93010 ELECTROCARDIOGRAM REPORT: CPT | Mod: 76,,, | Performed by: INTERNAL MEDICINE

## 2023-12-29 RX ORDER — PRAVASTATIN SODIUM 40 MG/1
80 TABLET ORAL DAILY
Status: DISCONTINUED | OUTPATIENT
Start: 2023-12-30 | End: 2024-01-18 | Stop reason: HOSPADM

## 2023-12-29 RX ORDER — ASPIRIN 325 MG
325 TABLET, DELAYED RELEASE (ENTERIC COATED) ORAL DAILY
Status: DISCONTINUED | OUTPATIENT
Start: 2023-12-30 | End: 2024-01-18 | Stop reason: HOSPADM

## 2023-12-29 RX ORDER — FUROSEMIDE 10 MG/ML
40 INJECTION INTRAMUSCULAR; INTRAVENOUS
Status: DISCONTINUED | OUTPATIENT
Start: 2023-12-30 | End: 2023-12-29

## 2023-12-29 RX ORDER — FUROSEMIDE 10 MG/ML
80 INJECTION INTRAMUSCULAR; INTRAVENOUS
Status: DISCONTINUED | OUTPATIENT
Start: 2023-12-30 | End: 2024-01-03

## 2023-12-29 RX ORDER — HEPARIN SODIUM 5000 [USP'U]/ML
5000 INJECTION, SOLUTION INTRAVENOUS; SUBCUTANEOUS EVERY 8 HOURS
Status: DISCONTINUED | OUTPATIENT
Start: 2023-12-29 | End: 2024-01-18 | Stop reason: HOSPADM

## 2023-12-29 RX ORDER — FUROSEMIDE 10 MG/ML
100 INJECTION INTRAMUSCULAR; INTRAVENOUS
Status: COMPLETED | OUTPATIENT
Start: 2023-12-29 | End: 2023-12-29

## 2023-12-29 RX ORDER — SODIUM CHLORIDE 0.9 % (FLUSH) 0.9 %
10 SYRINGE (ML) INJECTION EVERY 12 HOURS PRN
Status: DISCONTINUED | OUTPATIENT
Start: 2023-12-29 | End: 2024-01-18 | Stop reason: HOSPADM

## 2023-12-29 RX ORDER — AMIODARONE HYDROCHLORIDE 200 MG/1
200 TABLET ORAL DAILY
Status: DISCONTINUED | OUTPATIENT
Start: 2023-12-30 | End: 2023-12-31

## 2023-12-29 RX ORDER — IBUPROFEN 200 MG
24 TABLET ORAL
Status: DISCONTINUED | OUTPATIENT
Start: 2023-12-29 | End: 2024-01-18 | Stop reason: HOSPADM

## 2023-12-29 RX ORDER — ACETAMINOPHEN 325 MG/1
650 TABLET ORAL EVERY 6 HOURS PRN
Status: DISCONTINUED | OUTPATIENT
Start: 2023-12-29 | End: 2024-01-18 | Stop reason: HOSPADM

## 2023-12-29 RX ORDER — GLUCAGON 1 MG
1 KIT INJECTION
Status: DISCONTINUED | OUTPATIENT
Start: 2023-12-29 | End: 2024-01-18 | Stop reason: HOSPADM

## 2023-12-29 RX ORDER — NALOXONE HCL 0.4 MG/ML
0.02 VIAL (ML) INJECTION
Status: DISCONTINUED | OUTPATIENT
Start: 2023-12-29 | End: 2024-01-18 | Stop reason: HOSPADM

## 2023-12-29 RX ORDER — IBUPROFEN 200 MG
16 TABLET ORAL
Status: DISCONTINUED | OUTPATIENT
Start: 2023-12-29 | End: 2024-01-18 | Stop reason: HOSPADM

## 2023-12-29 RX ADMIN — HEPARIN SODIUM 5000 UNITS: 5000 INJECTION INTRAVENOUS; SUBCUTANEOUS at 09:12

## 2023-12-29 RX ADMIN — FUROSEMIDE 100 MG: 10 INJECTION, SOLUTION INTRAMUSCULAR; INTRAVENOUS at 05:12

## 2023-12-29 NOTE — ASSESSMENT & PLAN NOTE
Creatinine 2.1 on admit, baseline around 1.4  Recent Labs     12/29/23  1604   BUN 54*   CREATININE 2.1*       Estimated Creatinine Clearance: 47 mL/min (A) (based on SCr of 2.1 mg/dL (H)).    Plan:   - Monitor urine output and serial BMP and adjust therapy as needed.   - Check urine lytes and renal/retroperitoneal ultrasound  - Check urine protein creatinine ratio.  - Strict I&Os and daily weights   - Avoid nephrotoxic agents such as NSAIDs, gadolinium and IV radiocontrast.  - Renally dose meds to current GFR.  - Maintain MAP > 65.

## 2023-12-29 NOTE — HPI
Papito Bhakta is a 68 y.o. male with NICM, combined CHF(11/2023 EF 10 -15%, G3DD) s/p ICD placement, CKD, HLD, HTN, and AMELIA who presents for bilateral lower extremity swelling and shortness of breath. Patient reports he has been having worsening shortness of breath for the past 6 months. He had acutely worsening SOB today where he described becoming profoundly dyspneic on exertion. He reported taking 2 of his 80 mg Lasix this morning with subsequent minimal urine output and minimal improvement in his SOB. He reports SOB aggravated with lying down and he requires frequent positional changes to keep comfortable. He reports additional poor appetite and urine output for the past week although he reports he has been drinking well. He had 2 episodes of nausea/vomiting this past week as well. He denies fever/chills, chest pain, abdominal pain, recent illness, medication changes. Patient reports adherence with all medications. He reports he lives with his daughter who helps him with his medications and healthcare.    Additionally he reports chronic leg pain from a chronic RLE wound. He went to wound care yesterday where dressings were changed and he was told they were healing well. He has bilateral lower extremity edema R>L that is typical for him and he denies recent worsening.

## 2023-12-29 NOTE — SUBJECTIVE & OBJECTIVE
Past Medical History:   Diagnosis Date    RIGOBERTO (acute kidney injury) 10/7/2020    Anticoagulant long-term use     Aspirin    CHF (congestive heart failure)     Hyperlipidemia     Hypertension     NICM (nonischemic cardiomyopathy) 6/16/2020    Obesity     AMELIA (obstructive sleep apnea) 1/7/2022    Peripheral vascular disease, unspecified 2/1/2021       Past Surgical History:   Procedure Laterality Date    INSERTION OF BIVENTRICULAR IMPLANTABLE CARDIOVERTER-DEFIBRILLATOR (ICD) N/A 02/13/2019    Procedure: INSERTION, ICD, BIVENTRICULAR;  Surgeon: Shailesh Eng MD;  Location: [facility] CATH LAB;  Service: Cardiology;  Laterality: N/A;  RN PRE OP 2-6-19  1ST CASE PER  RADHA. NOTIFIED [facility] THAT ANESTHESIA IS NOT PITO FOR 1ST CASE START-LO    INSERTION OF BIVENTRICULAR IMPLANTABLE CARDIOVERTER-DEFIBRILLATOR (ICD) N/A 09/28/2020    Procedure: INSERTION, ICD, BIVENTRICULAR;  Surgeon: Jim Kwong MD;  Location: [facility] EP LAB;  Service: Cardiology;  Laterality: N/A;  NICM, CRT-D, SJM,, MAC, DM, 3 Prep*Wearing LifeVest*    oral extraction  11/2018    TESTICLE SURGERY         Review of patient's allergies indicates:   Allergen Reactions    Ramipril      Leg swelling and gynecomastia     Losartan Rash       No current facility-administered medications on file prior to encounter.     Current Outpatient Medications on File Prior to Encounter   Medication Sig    amiodarone (PACERONE) 200 MG Tab Take 1 tablet (200 mg total) by mouth once daily.    aspirin (ECOTRIN) 325 MG EC tablet Take 1 tablet (325 mg total) by mouth once daily.    empagliflozin (JARDIANCE) 10 mg tablet Take 1 tablet (10 mg total) by mouth once daily.    furosemide (LASIX) 80 MG tablet TAKE 1 TABLET EVERY DAY    metoprolol succinate (TOPROL-XL) 50 MG 24 hr tablet TAKE 1 TABLET EVERY DAY    potassium chloride (K-TAB) 20 mEq TAKE 1 TABLET EVERY DAY    pravastatin (PRAVACHOL) 80 MG tablet TAKE 1 TABLET EVERY DAY    spironolactone (ALDACTONE) 25 MG tablet TAKE 1/2  TABLET (12.5 MG TOTAL) ONCE DAILY. HOLD IF SYSTOLIC BLOOD PRESSURE IS LESS THAN 110    acetaminophen (TYLENOL) 500 MG tablet Take 2 tablets (1,000 mg total) by mouth every 8 (eight) hours as needed for Pain or Temperature greater than (100.5).    [DISCONTINUED] ramipril (ALTACE) 10 MG capsule Take 1 capsule (10 mg total) by mouth once daily.     Family History       Problem Relation (Age of Onset)    Epilepsy Mother          Tobacco Use    Smoking status: Former     Current packs/day: 0.00     Average packs/day: 0.5 packs/day for 40.0 years (20.0 ttl pk-yrs)     Types: Cigarettes, Cigars     Start date:      Quit date:      Years since quittin.9     Passive exposure: Current    Smokeless tobacco: Never   Substance and Sexual Activity    Alcohol use: Yes     Comment: once a month beer or liquor    Drug use: No    Sexual activity: Not Currently     Birth control/protection: None     Comment: uses protection sometimes     Review of Systems   Constitutional:  Positive for appetite change. Negative for chills and fever.   Eyes:  Negative for visual disturbance.   Respiratory:  Positive for shortness of breath.    Cardiovascular:  Positive for leg swelling (chronic). Negative for chest pain.   Gastrointestinal:  Negative for abdominal pain, nausea and vomiting.   Genitourinary:  Positive for difficulty urinating.   Musculoskeletal:         Leg pain   Neurological:  Negative for light-headedness and headaches.     Objective:     Vital Signs (Most Recent):  Temp: 97.5 °F (36.4 °C) (23)  Pulse: 74 (23)  Resp: (!) 24 (23)  BP: 110/75 (23)  SpO2: 100 % (23) Vital Signs (24h Range):  Temp:  [97.5 °F (36.4 °C)-98 °F (36.7 °C)] 97.5 °F (36.4 °C)  Pulse:  [74-76] 74  Resp:  [16-24] 24  SpO2:  [98 %-100 %] 100 %  BP: (103-110)/(72-75) 110/75     Weight: 113.4 kg (250 lb)  Body mass index is 29.65 kg/m².     Physical Exam  Vitals and nursing note reviewed.    Constitutional:       General: He is not in acute distress.     Appearance: He is ill-appearing. He is not toxic-appearing or diaphoretic.   HENT:      Head: Normocephalic and atraumatic.      Right Ear: External ear normal.      Left Ear: External ear normal.      Mouth/Throat:      Mouth: Mucous membranes are moist.   Eyes:      General: Scleral icterus present.         Right eye: No discharge.         Left eye: No discharge.      Comments: Anisocoria   Cardiovascular:      Rate and Rhythm: Normal rate and regular rhythm.      Heart sounds: No murmur heard.     Gallop present.   Pulmonary:      Effort: Pulmonary effort is normal. No respiratory distress.      Breath sounds: No wheezing or rales.   Abdominal:      General: Abdomen is flat. Bowel sounds are normal. There is no distension.      Palpations: Abdomen is soft.      Tenderness: There is no abdominal tenderness. There is no guarding.   Musculoskeletal:         General: No deformity.      Cervical back: No rigidity.      Right lower leg: Edema present.      Left lower leg: Edema present.      Comments: RLE wound dressings in place   Skin:     General: Skin is dry.      Coloration: Skin is not jaundiced.      Comments: Cool extremities   Neurological:      Mental Status: He is alert and oriented to person, place, and time. Mental status is at baseline.   Psychiatric:         Mood and Affect: Mood normal.         Behavior: Behavior normal.                Significant Labs: All pertinent labs within the past 24 hours have been reviewed.  CBC:   Recent Labs   Lab 12/29/23  1604   WBC 6.10   HGB 13.8*   HCT 43.0   *     CMP:   Recent Labs   Lab 12/29/23  1604   *   K 4.8   CL 96   CO2 21*   GLU 93   BUN 54*   CREATININE 2.1*   CALCIUM 10.4   PROT 7.3   ALBUMIN 3.0*   BILITOT 6.0*   ALKPHOS 91   AST 30   ALT 25   ANIONGAP 17*       Significant Imaging: I have reviewed all pertinent imaging results/findings within the past 24 hours.

## 2023-12-29 NOTE — Clinical Note
Diagnosis: Congestive heart failure, unspecified HF chronicity, unspecified heart failure type [[id number]]   Future Attending Provider: EVELYN LYN [[id number]]   Admitting Provider:: EVELYN LYN [[id number]]   Special Needs:: No Special Needs [1]

## 2023-12-29 NOTE — ED PROVIDER NOTES
Encounter Date: 12/29/2023       History     Chief Complaint   Patient presents with    Leg Swelling     Fatigue and worsening lower extremity edema for past couple days, c exertional dyspnea, unable to walk now.     68 y.o. male with nonischemic cardiomyopathy, CKD, HLD, HTN presents for bilateral lower extremity swelling and shortness of breath.  Patient reports for the past several months he has been short of breath but it has been worsening over the past 2 weeks.  He reports exertional dyspnea where he is unable to even walk across the room.  He also reports bilateral lower extremity edema.  He reports chronic right leg swelling but he is now having swelling in the left leg as well.  He denies any chest pain, fever.  He does have a chronic wound to the right leg    The history is provided by the patient and medical records.     Review of patient's allergies indicates:   Allergen Reactions    Ramipril      Leg swelling and gynecomastia     Losartan Rash     Past Medical History:   Diagnosis Date    RIGOBERTO (acute kidney injury) 10/7/2020    Anticoagulant long-term use     Aspirin    CHF (congestive heart failure)     Hyperlipidemia     Hypertension     NICM (nonischemic cardiomyopathy) 6/16/2020    Obesity     AMELIA (obstructive sleep apnea) 1/7/2022    Peripheral vascular disease, unspecified 2/1/2021     Past Surgical History:   Procedure Laterality Date    INSERTION OF BIVENTRICULAR IMPLANTABLE CARDIOVERTER-DEFIBRILLATOR (ICD) N/A 02/13/2019    Procedure: INSERTION, ICD, BIVENTRICULAR;  Surgeon: Shailesh Eng MD;  Location: [facility] CATH LAB;  Service: Cardiology;  Laterality: N/A;  RN PRE OP 2-6-19  1ST CASE PER  RADHA. NOTIFIED RADHA THAT ANESTHESIA IS NOT PITO FOR 1ST CASE START-LO    INSERTION OF BIVENTRICULAR IMPLANTABLE CARDIOVERTER-DEFIBRILLATOR (ICD) N/A 09/28/2020    Procedure: INSERTION, ICD, BIVENTRICULAR;  Surgeon: Jim Kwong MD;  Location: [facility] EP LAB;  Service: Cardiology;  Laterality: N/A;  NICM,  CRT-D, SJM,, MAC, DM, 3 Prep*Wearing LifeVest*    oral extraction  2018    TESTICLE SURGERY       Family History   Problem Relation Age of Onset    Epilepsy Mother      Social History     Tobacco Use    Smoking status: Former     Current packs/day: 0.00     Average packs/day: 0.5 packs/day for 40.0 years (20.0 ttl pk-yrs)     Types: Cigarettes, Cigars     Start date:      Quit date:      Years since quittin.9     Passive exposure: Current    Smokeless tobacco: Never   Substance Use Topics    Alcohol use: Yes     Comment: once a month beer or liquor    Drug use: No     Review of Systems   Reason unable to perform ROS: See HPI for relevant ROS.       Physical Exam     Initial Vitals [23 1319]   BP Pulse Resp Temp SpO2   103/72 76 16 98 °F (36.7 °C) 98 %      MAP       --         Physical Exam    Nursing note and vitals reviewed.  Constitutional:   Alert, speaking full sentences, no acute distress   Eyes: Conjunctivae are normal. No scleral icterus.   Irregular, dilated left pupil   Cardiovascular:  Normal rate, regular rhythm and intact distal pulses.           Pulmonary/Chest:   Mild tachypnea, rales in bilateral bases   Abdominal: Abdomen is soft. He exhibits no distension. There is no abdominal tenderness.   Musculoskeletal:      Comments: Pitting edema to BLE, right greater than left     Neurological: He is alert and oriented to person, place, and time.   Skin: Skin is warm and dry.         ED Course   Procedures  Labs Reviewed   CBC W/ AUTO DIFFERENTIAL - Abnormal; Notable for the following components:       Result Value    Hemoglobin 13.8 (*)     MCV 74 (*)     MCH 23.7 (*)     RDW 21.9 (*)     Platelets 123 (*)     All other components within normal limits   COMPREHENSIVE METABOLIC PANEL - Abnormal; Notable for the following components:    Sodium 134 (*)     CO2 21 (*)     BUN 54 (*)     Creatinine 2.1 (*)     Albumin 3.0 (*)     Total Bilirubin 6.0 (*)     eGFR 33.7 (*)     Anion Gap 17  (*)     All other components within normal limits   TROPONIN I - Abnormal; Notable for the following components:    Troponin I 0.031 (*)     All other components within normal limits   B-TYPE NATRIURETIC PEPTIDE - Abnormal; Notable for the following components:    BNP 3,067 (*)     All other components within normal limits   SODIUM, URINE, RANDOM   CREATININE, URINE, RANDOM   PROTEIN / CREATININE RATIO, URINE     EKG Readings: (Independently Interpreted)   Paced rhythm with occasional native complex, regular, wide complex, rate of 69, Sgarbossa negative, overall similar to prior     ECG Results              EKG 12-lead (Final result)  Result time 12/29/23 15:21:49      Final result by Interface, Lab In [facility] (12/29/23 15:21:49)                   Narrative:    Test Reason : R06.02,    Vent. Rate : 080 BPM     Atrial Rate : 080 BPM     P-R Int : 092 ms          QRS Dur : 178 ms      QT Int : 482 ms       P-R-T Axes : 000 -58 114 degrees     QTc Int : 555 ms    Atrial-sensed ventricular-paced rhythm  Abnormal ECG  When compared with ECG of 06-DEC-2023 09:26,  A spikes not noted  Confirmed by Pasquale SARMIENTO MD ([id number]) on 12/29/2023 3:21:39 PM    Referred By: [email or web address]   SELF           Confirmed By:Pasquale SARMIENTO MD                                  Imaging Results              X-Ray Chest AP Portable (Final result)  Result time 12/29/23 15:20:19      Final result by Jm Mcmahan MD (12/29/23 15:20:19)                   Impression:      See above      Electronically signed by: mJ Mcmahan MD  Date:    12/29/2023  Time:    15:20               Narrative:    EXAMINATION:  XR CHEST AP PORTABLE    CLINICAL HISTORY:  CHF;    TECHNIQUE:  Single frontal view of the chest was performed.    COMPARISON:  N 12/06/2023 one    FINDINGS:  Pacemaker remain.  Cardiomegaly, pulmonary vascular congestion and bibasilar edema.  The lung apices are clear.  No significant pleural effusion.                                       Medications    pravastatin tablet 80 mg (has no administration in time range)   amiodarone tablet 200 mg (has no administration in time range)   empagliflozin (Jardiance) tablet 10 mg (has no administration in time range)   aspirin EC tablet 325 mg (has no administration in time range)   sodium chloride 0.9% flush 10 mL (has no administration in time range)   naloxone 0.4 mg/mL injection 0.02 mg (has no administration in time range)   glucose chewable tablet 16 g (has no administration in time range)   glucose chewable tablet 24 g (has no administration in time range)   glucagon (human recombinant) injection 1 mg (has no administration in time range)   heparin (porcine) injection 5,000 Units (5,000 Units Subcutaneous Given 12/29/23 2124)   dextrose 10% bolus 125 mL 125 mL (has no administration in time range)   dextrose 10% bolus 250 mL 250 mL (has no administration in time range)   furosemide injection 80 mg (has no administration in time range)   acetaminophen tablet 650 mg (has no administration in time range)   furosemide injection 100 mg (100 mg Intravenous Given 12/29/23 1722)     Medical Decision Making  68 y.o. male with nonischemic cardiomyopathy, CKD, HLD, HTN presents for bilateral lower extremity swelling and shortness of breath  Differentials include CHF exacerbation, pulmonary edema, RIGOBERTO, metabolic derangement, less likely ACS  EKG without evidence of ischemia, shows paced rhythm, Sgarbossa negative, patient denies any chest pain  Patient mildly tachypneic with rales and evidence of fluid overload, no respiratory distress, SpO2 in the mid to high 90s on room air  Patient does have a wound to the right lower leg that is being managed by wound care, he denies any new/worsening symptoms of the right leg but reports he is now having edema in the left leg  No fever/chills, patient nontoxic appearing  Patient given large dose of IV Lasix with minimal urine output, creatinine at baseline, however there is suspicion for  worsening kidney functioning context of decreased urine output despite high dose of Lasix, discussed with hospital medicine for admission and additional diuresis.  Of note, patient does have an irregular left pupil, he denies any pain or vision changes currently, no evidence to suggest intracranial pathology or ophthalmologic emergency, he is followed by Ophthalmology and will likely need to follow-up with them after discharge    Amount and/or Complexity of Data Reviewed  Labs:  Decision-making details documented in ED Course.  Radiology:  Decision-making details documented in ED Course.    Risk  Prescription drug management.               ED Course as of 12/29/23 2133   Fri Dec 29, 2023   1508 X-Ray Chest AP Portable  Findings, per independent interpretation:  Markedly enlarged cardiomediastinal silhouette, bilateral opacification concerning for pulmonary edema, slightly more prominent compared to prior [OK]   1715 BNP(!): 3,067 [OK]   1715 Troponin I(!): 0.031 [OK]      ED Course User Index  [OK] Michele Nichole MD                           Clinical Impression:  Final diagnoses:  [R06.02] Shortness of breath  [I50.9] Congestive heart failure, unspecified HF chronicity, unspecified heart failure type (Primary)          ED Disposition Condition    Observation Stable                Michele Nichole MD  12/29/23 2133

## 2023-12-29 NOTE — ED TRIAGE NOTES
Pt has chronic swelling to right leg which was dressed by wound care yesterday  Pt complains of swelling to left legfor about 2 weeks , started with shortness of breath  and  took 80 mg of Lasix with little urination

## 2023-12-29 NOTE — FIRST PROVIDER EVALUATION
Emergency Department TeleTriage Encounter Note      CHIEF COMPLAINT    Chief Complaint   Patient presents with    Leg Swelling     Fatigue and worsening lower extremity edema for past couple days, c exertional dyspnea, unable to walk now.       VITAL SIGNS   Initial Vitals [12/29/23 1319]   BP Pulse Resp Temp SpO2   103/72 76 16 98 °F (36.7 °C) 98 %      MAP       --            ALLERGIES    Review of patient's allergies indicates:   Allergen Reactions    Ramipril      Leg swelling and gynecomastia     Losartan Rash       PROVIDER TRIAGE NOTE  Verbal consent for the teletriage evaluation was given by the patient at the start of the evaluation.  All efforts will be made to maintain patient's privacy during the evaluation.      This is a teletriage evaluation of a 68 y.o. male presenting to the ED with c/o BLE swelling that started yesterday.  Patient also reports SOB that started yesterday.  Having difficulty walking. Limited physical exam via telehealth: The patient is awake, alert, answering questions appropriately and is not in respiratory distress.  As the Teletriage provider, I performed an initial assessment and ordered appropriate labs and imaging studies, if any, to facilitate the patient's care once placed in the ED. Once a room is available, care and a full evaluation will be completed by an alternate ED provider.  Any additional orders and the final disposition will be determined by that provider.  All imaging and labs will not be followed-up by the Teletriage Team, including myself.          ORDERS  Labs Reviewed - No data to display    ED Orders (720h ago, onward)      Start Ordered     Status Ordering Provider    12/29/23 1349 12/29/23 1348  Confirm Patient is not Eligible for Congestive Heart Failure Pathway  Until discontinued         Ordered JACQUE FLORES    12/29/23 1349 12/29/23 1348  Vital signs  Every 30 min         Ordered JACQUE FLORES    12/29/23 1349 12/29/23 1348  Cardiac Monitoring - Adult   Continuous        Comments: Notify Physician If:    Ordered JACQUE FLORES    12/29/23 1349 12/29/23 1348  Pulse Oximetry Continuous  Continuous         Ordered JACQUE FLORES    12/29/23 1349 12/29/23 1348  Insert peripheral IV  Once         Ordered JACQUE FLORES    12/29/23 1349 12/29/23 1348  CBC auto differential  STAT         Ordered JACQUE FLORES    12/29/23 1349 12/29/23 1348  Comprehensive metabolic panel  STAT         Ordered JACQUE FLORES    12/29/23 1349 12/29/23 1348  Troponin I  STAT         Ordered JACQUE FLORES    12/29/23 1349 12/29/23 1348  Brain natriuretic peptide  STAT         Ordered JACQUE FLORES    12/29/23 1349 12/29/23 1348  EKG 12-lead  Once         Ordered JACQUE FLORES    12/29/23 1349 12/29/23 1348  X-Ray Chest AP Portable  1 time imaging         Ordered JACQUE FLORES    12/29/23 1337 12/29/23 1336  EKG 12-lead (Syncope) Age >50  Once        Comments: If patient  is > 50 yrs.    Completed by SUZIE CARTAGENA on 12/29/2023 at  1:39 PM MIR HARDIN              Virtual Visit Note: The provider triage portion of this emergency department evaluation and documentation was performed via [employer], a HIPAA-compliant telemedicine application, in concert with a tele-presenter in the room. A face to face patient evaluation with one of my colleagues will occur once the patient is placed in an emergency department room.      DISCLAIMER: This note was prepared with [employer] voice recognition transcription software. Garbled syntax, mangled pronouns, and other bizarre constructions may be attributed to that software system.

## 2023-12-30 LAB
ALBUMIN SERPL BCP-MCNC: 2.8 G/DL (ref 3.5–5.2)
ALBUMIN SERPL BCP-MCNC: 2.9 G/DL (ref 3.5–5.2)
ALLENS TEST: ABNORMAL
ALP SERPL-CCNC: 93 U/L (ref 55–135)
ALP SERPL-CCNC: 94 U/L (ref 55–135)
ALT SERPL W/O P-5'-P-CCNC: 29 U/L (ref 10–44)
ALT SERPL W/O P-5'-P-CCNC: 32 U/L (ref 10–44)
ANION GAP SERPL CALC-SCNC: 22 MMOL/L (ref 8–16)
ANION GAP SERPL CALC-SCNC: 24 MMOL/L (ref 8–16)
AST SERPL-CCNC: 38 U/L (ref 10–40)
AST SERPL-CCNC: 48 U/L (ref 10–40)
BILIRUB DIRECT SERPL-MCNC: 4.5 MG/DL (ref 0.1–0.3)
BILIRUB SERPL-MCNC: 6.7 MG/DL (ref 0.1–1)
BILIRUB SERPL-MCNC: 7 MG/DL (ref 0.1–1)
BUN SERPL-MCNC: 59 MG/DL (ref 8–23)
BUN SERPL-MCNC: 64 MG/DL (ref 8–23)
CALCIUM SERPL-MCNC: 10.2 MG/DL (ref 8.7–10.5)
CALCIUM SERPL-MCNC: 10.7 MG/DL (ref 8.7–10.5)
CHLORIDE SERPL-SCNC: 95 MMOL/L (ref 95–110)
CHLORIDE SERPL-SCNC: 96 MMOL/L (ref 95–110)
CO2 SERPL-SCNC: 16 MMOL/L (ref 23–29)
CO2 SERPL-SCNC: 17 MMOL/L (ref 23–29)
CREAT SERPL-MCNC: 2.3 MG/DL (ref 0.5–1.4)
CREAT SERPL-MCNC: 2.4 MG/DL (ref 0.5–1.4)
CREAT UR-MCNC: 133 MG/DL (ref 23–375)
CREAT UR-MCNC: 133 MG/DL (ref 23–375)
DELSYS: ABNORMAL
ERYTHROCYTE [DISTWIDTH] IN BLOOD BY AUTOMATED COUNT: 22.5 % (ref 11.5–14.5)
EST. GFR  (NO RACE VARIABLE): 28.7 ML/MIN/1.73 M^2
EST. GFR  (NO RACE VARIABLE): 30.2 ML/MIN/1.73 M^2
FERRITIN SERPL-MCNC: 184 NG/ML (ref 20–300)
GLUCOSE SERPL-MCNC: 42 MG/DL (ref 70–110)
GLUCOSE SERPL-MCNC: 89 MG/DL (ref 70–110)
HCO3 UR-SCNC: 23.5 MMOL/L (ref 24–28)
HCT VFR BLD AUTO: 42.3 % (ref 40–54)
HGB BLD-MCNC: 13.8 G/DL (ref 14–18)
IRON SERPL-MCNC: 26 UG/DL (ref 45–160)
MAGNESIUM SERPL-MCNC: 2.1 MG/DL (ref 1.6–2.6)
MCH RBC QN AUTO: 23.4 PG (ref 27–31)
MCHC RBC AUTO-ENTMCNC: 32.6 G/DL (ref 32–36)
MCV RBC AUTO: 72 FL (ref 82–98)
PCO2 BLDA: 39.9 MMHG (ref 35–45)
PH SMN: 7.38 [PH] (ref 7.35–7.45)
PHOSPHATE SERPL-MCNC: 5.4 MG/DL (ref 2.7–4.5)
PLATELET # BLD AUTO: 123 K/UL (ref 150–450)
PMV BLD AUTO: ABNORMAL FL (ref 9.2–12.9)
PO2 BLDA: 23 MMHG (ref 40–60)
POC BE: -2 MMOL/L
POC SATURATED O2: 40 % (ref 95–100)
POC TCO2: 25 MMOL/L (ref 24–29)
POCT GLUCOSE: 42 MG/DL (ref 70–110)
POCT GLUCOSE: 78 MG/DL (ref 70–110)
POCT GLUCOSE: 83 MG/DL (ref 70–110)
POTASSIUM SERPL-SCNC: 4.5 MMOL/L (ref 3.5–5.1)
POTASSIUM SERPL-SCNC: 5 MMOL/L (ref 3.5–5.1)
PROT SERPL-MCNC: 6.7 G/DL (ref 6–8.4)
PROT SERPL-MCNC: 7.1 G/DL (ref 6–8.4)
PROT UR-MCNC: 96 MG/DL (ref 0–15)
PROT/CREAT UR: 0.72 MG/G{CREAT} (ref 0–0.2)
RBC # BLD AUTO: 5.89 M/UL (ref 4.6–6.2)
SAMPLE: ABNORMAL
SATURATED IRON: 8 % (ref 20–50)
SITE: ABNORMAL
SODIUM SERPL-SCNC: 135 MMOL/L (ref 136–145)
SODIUM SERPL-SCNC: 135 MMOL/L (ref 136–145)
SODIUM UR-SCNC: 14 MMOL/L (ref 20–250)
TOTAL IRON BINDING CAPACITY: 343 UG/DL (ref 250–450)
TRANSFERRIN SERPL-MCNC: 232 MG/DL (ref 200–375)
TRANSFERRIN SERPL-MCNC: 232 MG/DL (ref 200–375)
WBC # BLD AUTO: 11.25 K/UL (ref 3.9–12.7)

## 2023-12-30 PROCEDURE — G0378 HOSPITAL OBSERVATION PER HR: HCPCS

## 2023-12-30 PROCEDURE — 63600175 PHARM REV CODE 636 W HCPCS

## 2023-12-30 PROCEDURE — 82248 BILIRUBIN DIRECT: CPT | Performed by: STUDENT IN AN ORGANIZED HEALTH CARE EDUCATION/TRAINING PROGRAM

## 2023-12-30 PROCEDURE — 84100 ASSAY OF PHOSPHORUS: CPT | Performed by: STUDENT IN AN ORGANIZED HEALTH CARE EDUCATION/TRAINING PROGRAM

## 2023-12-30 PROCEDURE — 92610 EVALUATE SWALLOWING FUNCTION: CPT

## 2023-12-30 PROCEDURE — 82728 ASSAY OF FERRITIN: CPT | Performed by: STUDENT IN AN ORGANIZED HEALTH CARE EDUCATION/TRAINING PROGRAM

## 2023-12-30 PROCEDURE — 97535 SELF CARE MNGMENT TRAINING: CPT

## 2023-12-30 PROCEDURE — 85027 COMPLETE CBC AUTOMATED: CPT | Performed by: STUDENT IN AN ORGANIZED HEALTH CARE EDUCATION/TRAINING PROGRAM

## 2023-12-30 PROCEDURE — 82803 BLOOD GASES ANY COMBINATION: CPT

## 2023-12-30 PROCEDURE — 84156 ASSAY OF PROTEIN URINE: CPT

## 2023-12-30 PROCEDURE — 63600175 PHARM REV CODE 636 W HCPCS: Performed by: STUDENT IN AN ORGANIZED HEALTH CARE EDUCATION/TRAINING PROGRAM

## 2023-12-30 PROCEDURE — 25000242 PHARM REV CODE 250 ALT 637 W/ HCPCS

## 2023-12-30 PROCEDURE — 99900035 HC TECH TIME PER 15 MIN (STAT)

## 2023-12-30 PROCEDURE — 83735 ASSAY OF MAGNESIUM: CPT | Performed by: STUDENT IN AN ORGANIZED HEALTH CARE EDUCATION/TRAINING PROGRAM

## 2023-12-30 PROCEDURE — 80053 COMPREHEN METABOLIC PANEL: CPT | Mod: 91 | Performed by: STUDENT IN AN ORGANIZED HEALTH CARE EDUCATION/TRAINING PROGRAM

## 2023-12-30 PROCEDURE — 25000003 PHARM REV CODE 250

## 2023-12-30 PROCEDURE — 84300 ASSAY OF URINE SODIUM: CPT

## 2023-12-30 PROCEDURE — 83540 ASSAY OF IRON: CPT | Performed by: STUDENT IN AN ORGANIZED HEALTH CARE EDUCATION/TRAINING PROGRAM

## 2023-12-30 PROCEDURE — 96372 THER/PROPH/DIAG INJ SC/IM: CPT

## 2023-12-30 PROCEDURE — 36415 COLL VENOUS BLD VENIPUNCTURE: CPT | Mod: XB | Performed by: STUDENT IN AN ORGANIZED HEALTH CARE EDUCATION/TRAINING PROGRAM

## 2023-12-30 PROCEDURE — 80053 COMPREHEN METABOLIC PANEL: CPT

## 2023-12-30 RX ORDER — LORAZEPAM 2 MG/ML
0.5 INJECTION INTRAMUSCULAR ONCE
Status: COMPLETED | OUTPATIENT
Start: 2023-12-30 | End: 2023-12-30

## 2023-12-30 RX ADMIN — PRAVASTATIN SODIUM 80 MG: 40 TABLET ORAL at 09:12

## 2023-12-30 RX ADMIN — FUROSEMIDE 80 MG: 10 INJECTION, SOLUTION INTRAVENOUS at 09:12

## 2023-12-30 RX ADMIN — AMIODARONE HYDROCHLORIDE 200 MG: 200 TABLET ORAL at 09:12

## 2023-12-30 RX ADMIN — LORAZEPAM 0.5 MG: 2 INJECTION INTRAMUSCULAR; INTRAVENOUS at 09:12

## 2023-12-30 RX ADMIN — DEXTROSE MONOHYDRATE 250 ML: 100 INJECTION, SOLUTION INTRAVENOUS at 07:12

## 2023-12-30 RX ADMIN — ACETAMINOPHEN 650 MG: 325 TABLET ORAL at 09:12

## 2023-12-30 RX ADMIN — HEPARIN SODIUM 5000 UNITS: 5000 INJECTION INTRAVENOUS; SUBCUTANEOUS at 09:12

## 2023-12-30 RX ADMIN — ASPIRIN 325 MG: 325 TABLET, COATED ORAL at 09:12

## 2023-12-30 RX ADMIN — HEPARIN SODIUM 5000 UNITS: 5000 INJECTION INTRAVENOUS; SUBCUTANEOUS at 05:12

## 2023-12-30 RX ADMIN — HEPARIN SODIUM 5000 UNITS: 5000 INJECTION INTRAVENOUS; SUBCUTANEOUS at 02:12

## 2023-12-30 RX ADMIN — EMPAGLIFLOZIN 10 MG: 10 TABLET, FILM COATED ORAL at 09:12

## 2023-12-30 NOTE — HOSPITAL COURSE
Pt admitted to hospital medicine for acute heart failure exacerbation and inability to swallow solids for 3 months duration. Initiated on IV lasix. SLP evaluated the patient and determined that he could tolerate liquids. He had a new leukocytosis noted on 12/31, work up significant for COVID positive. He completed remdesivir, steroids held in order to prevent worsening fluid retention, and was able to be weaned to room oxygen by 1/4. Overnight 12/31, patient becane tachycardic and hypotensive (asymptomatic). Cardiology called and concluded that he was in atrial fibrillation with RVR and he was briefly changed from oral amiodarone to IV amiodarone for one day before resuming his oral dosing. PM interrogated, noted to be functioning accurately. Required transition to lasix gtt for continued hypervolemia. Noted to have chronic bilirubinemia, likely 2/2 congestive hepatopathy. RUQ US without biliary obstruction and hemolysis labs negative. Started on dobutamine gtt. After continued diuresis, was able to stop lasix gtt and transition to bumex. A tunneled line was placed for outpatient dobutamine infusion. EGD unremarkable, GI plans to continue dysphagia workup outpatient. Pt has been HDS for the last several days and has maintained euvolemia on current bumex dose. We are discharging him home on hospice with dobutamine gtt. Also has outpatient follow-up with GI, cardiology, and [facility].

## 2023-12-30 NOTE — NURSING
Notified Avni KHAN. lab called  critical value of BG 42, RN checked with glucometer again result is 42 as well, glucose tablet given per order and after pt chew on the  first tablet,  pt threw  up 240 mL and pt  said he can't  keep hard food  down for  3 months now,hold  the  glucose  tablet since  pt threw up, and Avni KHAN  is ok to  give dextrose 10% PRN order instead, recheck BG is 78 after dextrose  10%  and orange juice  given,and recheck BG at 0952, result is 83. Pt threw up again around  0950, lorazepam ordered and  given for N/V. Pt said he feel much  better after  lorazepam given.

## 2023-12-30 NOTE — NURSING
1300: Notified team at bedside pt urinated 200 mL after lasix, team  wants to bladder scan.    1330: Notified team scan before urinated was 122 mL, then pt urinated 100 mL right after that, post void bladder scan result is 18 mL.    1528: Notified team VBG is done.

## 2023-12-30 NOTE — ED NOTES
Pt identifiers Papito Bhakta were  and are   LOC: The patient is awake, alert, aware of environment with an appropriate affect. Oriented x4, speaking appropriately  APPEARANCE: Pt rates right ankle pain a 10/10 , in no acute distress, pt is clean and well groomed, clothing properly fastened  SKIN: Skin warm, dry and intact, normal skin turgor, moist mucus membranes  RESPIRATORY: Airway is open and patent, respirations are spontaneous, even and unlabored, normal effort and rate  CARDIAC: Normal rate and rhythm, 2 + peripheral edema noted, capillary refill < 3 seconds, bilateral radial pulses 2+  Pt is on a cardiac monitor and pulse oximetry   ABDOMEN: Soft, nontender, nondistended. Bowel sounds present   NEUROLOGIC: PERRL, facial expression is symmetrical, patient moving all extremities spontaneously, normal sensation in all extremities when touched with a finger.  Follows all commands appropriately  MUSCULOSKELETAL: No obvious deformities.      
Pt is on a cardiac monitor and pulse oximetry  
Telemetry Verification   Patient placed on Telemetry Box  Verified on ED monitor  Box [id number]   Monitor Tech Anies    Rate NSR   Rhythm 65        
complains of pain/discomfort

## 2023-12-30 NOTE — ASSESSMENT & PLAN NOTE
Patient is identified as having Combined Systolic and Diastolic heart failure that is Acute on chronic. CHF is currently uncontrolled due to Pulmonary edema/pleural effusion on CXR. Latest ECHO performed and demonstrates- Results for orders placed during the hospital encounter of 11/10/23    Echo    Interpretation Summary    Left Ventricle: The left ventricle is severely dilated. Mildly increased wall thickness. There is mild concentric hypertrophy. Severe global hypokinesis present. There is severely reduced systolic function with a visually estimated ejection fraction of 10 -15%. Grade III diastolic dysfunction.    Right Ventricle: Severe right ventricular enlargement. Systolic function is severely reduced.    Mitral Valve: There is no stenosis. There is mild to moderate regurgitation with a centrally directed jet.    Tricuspid Valve: There is mild to moderate regurgitation.    Pulmonary Artery: The estimated pulmonary artery systolic pressure is 67 mmHg.  .Last BNP reviewed- and noted below   Recent Labs   Lab 12/29/23  1604   BNP 3,067*         Presented for acute on chronic SOB with associated low UOP. Afebrile HDS. BNP 3,067, Troponin 0.031. CXR with cardiomegaly, vascular congestion, and edema. Received 100 of Lasix in the ED.    - IV lasix 80 BID. If urine output suboptimal consider increasing dose or additional diuretics in combination  - continue home Jardiance   - Beta blocker/aldactone held given soft pressures, resume when appropriate  - Cardiac diet with Fluid restriction at 1.5L with strict I/Os and daily STANDING weights  - Check Electrolytes, keep Mag >2 & K+ >4  - SCDs, Ambulate as tolerated    - Strict I&Os, Daily standing weights  - Review Low-salt diet and enforce medication adherence on discharge

## 2023-12-30 NOTE — ASSESSMENT & PLAN NOTE
Chronic, controlled. Latest blood pressure and vitals reviewed-     Temp:  [97.5 °F (36.4 °C)-98 °F (36.7 °C)]   Pulse:  [74-76]   Resp:  [16-24]   BP: (103-110)/(72-75)   SpO2:  [98 %-100 %] .   Home meds for hypertension were reviewed and noted below.   Hypertension Medications               furosemide (LASIX) 80 MG tablet TAKE 1 TABLET EVERY DAY    metoprolol succinate (TOPROL-XL) 50 MG 24 hr tablet TAKE 1 TABLET EVERY DAY    spironolactone (ALDACTONE) 25 MG tablet TAKE 1/2 TABLET (12.5 MG TOTAL) ONCE DAILY. HOLD IF SYSTOLIC BLOOD PRESSURE IS LESS THAN 110            While in the hospital, will manage blood pressure as follows; Adjust home antihypertensive regimen as follows- Holding in setting of borderline pressures in setting of new diuresis     Will utilize p.r.n. blood pressure medication only if patient's blood pressure greater than 180/110 and he develops symptoms such as worsening chest pain or shortness of breath.

## 2023-12-30 NOTE — AI DETERIORATION ALERT
"RAPID RESPONSE NURSE AI ALERT       AI alert received.    Chart Reviewed: 12/30/2023, 8:13 AM    MRN: [id number]  Bed: 331/331 A    Dx: Acute on chronic combined systolic and diastolic CHF (congestive heart failure)    Papito Bhakta has a past medical history of RIGOBERTO (acute kidney injury), Anticoagulant long-term use, CHF (congestive heart failure), Hyperlipidemia, Hypertension, NICM (nonischemic cardiomyopathy), Obesity, AMELIA (obstructive sleep apnea), and Peripheral vascular disease, unspecified.    Last VS: /62 (BP Location: Left arm, Patient Position: Sitting)   Pulse 72   Temp 98.1 °F (36.7 °C) (Oral)   Resp 18   Ht 6' 5" (1.956 m)   Wt 112.9 kg (248 lb 14.4 oz)   SpO2 97%   BMI 29.52 kg/m²     24H Vital Sign Range:  Temp:  [97.5 °F (36.4 °C)-98.1 °F (36.7 °C)]   Pulse:  [60-77]   Resp:  [16-24]   BP: (102-124)/(62-84)   SpO2:  [95 %-100 %]     Level of Consciousness (AVPU): responds to voice    Recent Labs     12/29/23  1604 12/30/23  0635   WBC 6.10 11.25   HGB 13.8* 13.8*   HCT 43.0 42.3   * 123*       Recent Labs     12/29/23  1604 12/30/23  0635   * 135*   K 4.8 5.0   CL 96 95   CO2 21* 16*   BUN 54* 59*   CREATININE 2.1* 2.4*   GLU 93 42*   PHOS  --  5.4*   MG  --  2.1        MEWS score: 2    Charge RN, Claudette  contacted. Glucose 42; treated with D10. Instructed to call [phone] for further concerns or assistance.    Misa Vaughan RN        "
no

## 2023-12-30 NOTE — ASSESSMENT & PLAN NOTE
Chronic, controlled. Latest blood pressure and vitals reviewed-     Temp:  [97.5 °F (36.4 °C)-98.1 °F (36.7 °C)]   Pulse:  [60-77]   Resp:  [16-24]   BP: (102-124)/(62-84)   SpO2:  [95 %-100 %] .   Home meds for hypertension were reviewed and noted below.   Hypertension Medications               furosemide (LASIX) 80 MG tablet TAKE 1 TABLET EVERY DAY    metoprolol succinate (TOPROL-XL) 50 MG 24 hr tablet TAKE 1 TABLET EVERY DAY    spironolactone (ALDACTONE) 25 MG tablet TAKE 1/2 TABLET (12.5 MG TOTAL) ONCE DAILY. HOLD IF SYSTOLIC BLOOD PRESSURE IS LESS THAN 110            While in the hospital, will manage blood pressure as follows; Adjust home antihypertensive regimen as follows- Holding in setting of borderline pressures in setting of new diuresis     Will utilize p.r.n. blood pressure medication only if patient's blood pressure greater than 180/110 and he develops symptoms such as worsening chest pain or shortness of breath.

## 2023-12-30 NOTE — NURSING
Nurses Note -- 4 Eyes      12/29/2023   9:58 PM      Skin assessed during: Admit      [] No Altered Skin Integrity Present    []Prevention Measures Documented      [x] Yes- Altered Skin Integrity Present or Discovered   [] LDA Added if Not in Epic (Describe Wound)   [x] New Altered Skin Integrity was Present on Admit and Documented in LDA   [] Wound Image Taken    Wound Care Consulted? Yes    Attending Nurse:  Alexx Panchal RN/Staff Member:  Bib

## 2023-12-30 NOTE — PT/OT/SLP EVAL
Speech Language Pathology Evaluation  Bedside Swallow    Patient Name:  Papito Bhakta   MRN:  [id number]  Admitting Diagnosis: Acute on chronic combined systolic and diastolic CHF (congestive heart failure)    Recommendations:                 General Recommendations:  Dysphagia therapy  Diet recommendations:   , Thin liquids - IDDSI Level 0   Aspiration Precautions: Small bites/sips   General Precautions: Standard, fall  Communication strategies:  none    Assessment:     Papito Bhakta is a 68 y.o. male with an SLP diagnosis of dysphagia.    History:     Past Medical History:   Diagnosis Date    RIGOBERTO (acute kidney injury) 10/7/2020    Anticoagulant long-term use     Aspirin    CHF (congestive heart failure)     Hyperlipidemia     Hypertension     NICM (nonischemic cardiomyopathy) 6/16/2020    Obesity     AMELIA (obstructive sleep apnea) 1/7/2022    Peripheral vascular disease, unspecified 2/1/2021       Past Surgical History:   Procedure Laterality Date    INSERTION OF BIVENTRICULAR IMPLANTABLE CARDIOVERTER-DEFIBRILLATOR (ICD) N/A 02/13/2019    Procedure: INSERTION, ICD, BIVENTRICULAR;  Surgeon: Shailesh Eng MD;  Location: [facility] CATH LAB;  Service: Cardiology;  Laterality: N/A;  RN PRE OP 2-6-19  1ST CASE PER  RADHA. NOTIFIED RADHA THAT ANESTHESIA IS NOT PITO FOR 1ST CASE START-LO    INSERTION OF BIVENTRICULAR IMPLANTABLE CARDIOVERTER-DEFIBRILLATOR (ICD) N/A 09/28/2020    Procedure: INSERTION, ICD, BIVENTRICULAR;  Surgeon: Jim Kwong MD;  Location: [facility] EP LAB;  Service: Cardiology;  Laterality: N/A;  NICM, CRT-D, SJM,, MAC, DM, 3 Prep*Wearing LifeVest*    oral extraction  11/2018    TESTICLE SURGERY         Social History: Patient lives with family.    Prior Intubation HX:  none this admit    Prior diet: Pt stated he has only been able to tolerate liquids for ~3 weeks 2/2 nausea/vomiting      Subjective     Awake/alert    Pain/Comfort:  Pain Rating 1: 0/10  Pain Rating Post-Intervention 1: 0/10    Respiratory  "Status: Room air    Objective:     Oral Musculature Evaluation  Oral Musculature: WFL  Dentition: present and adequate  Oral Labial Strength and Mobility: WFL  Lingual Strength and Mobility: WFL  Volitional Cough: adequate  Voice Prior to PO Intake: clear    Bedside Swallow Eval:   Consistencies Assessed:  Thin liquids x3 oz straw      Oral Phase:   WFL    Pharyngeal Phase:   no overt clinical signs/symptoms of aspiration      Treatment: Pt initially seen for swallow eval and pt stated he has been unable to "keep down" more than liquids for ~ 3 weeks. Prior to PO trials pt with spontaneous episode of emesis without PO intake. SLP returned to room in PM pt tolerated 3 oz thin liquids via straw without overt s/s of airway compromise. Overall oropharyngeal swallow deemed WFL. No puree/solid trials provided this date 2/2 nausea and earlier episode of vomiting. SLP spoke with primary team and GI to relay concerns. Pt's daughter at bedside during second session and confirmed pt's difficulty with "keeping anything other than liquids down."    Goals:   Multidisciplinary Problems       SLP Goals          Problem: SLP    Goal Priority Disciplines Outcome   SLP Goal     SLP    Description: Speech Language Pathology Goals  Goals expected to be met by 1/6    1. Pt will participate in ongoing swallow assessment                               Plan:     Patient to be seen:  3 x/week   Plan of Care reviewed with:  patient   SLP Follow-Up:  Yes       Discharge recommendations:    TBD    Time Tracking:     SLP Treatment Date:   12/30/23  Speech Start Time:  0920 (1315)  Speech Stop Time:  0929   (1326)  Speech Total Time (min):  9 min +11 min    Billable Minutes: Treatment Swallowing Dysfunction 11 and Eval Swallow and Oral Function 9      12/30/2023         "

## 2023-12-30 NOTE — ASSESSMENT & PLAN NOTE
Complaints of several day history of N/V on admission. Also noted 3 month history of difficulty swallowing solid foods.    - Consulted SLP  - Consider GI consult

## 2023-12-30 NOTE — ASSESSMENT & PLAN NOTE
Notable asymetry on pupils by patient. Chart review shows left orbital trauma in 2021 with notes concerned for dilation and vision changes. When talking to daughter, this is chronic. States no vision changes or neuro symptoms whatsoever.

## 2023-12-30 NOTE — ASSESSMENT & PLAN NOTE
Creatinine 2.1 on admit, baseline around 1.4. Likely prerenal etiology given urine sodium and FENa.  Recent Labs     12/29/23  1604 12/30/23  0635   BUN 54* 59*   CREATININE 2.1* 2.4*         Estimated Creatinine Clearance: 41.1 mL/min (A) (based on SCr of 2.4 mg/dL (H)).    - Renal US: renal cysts, no hydronephrosis  - Urine Na: 14, Pr/Cr: 0.72; FENa 0.2%  - Monitor urine output and serial BMP and adjust therapy as needed.   - Strict I&Os and daily weights   - Avoid nephrotoxic agents such as NSAIDs, gadolinium and IV radiocontrast.  - Renally dose meds to current GFR.  - Maintain MAP > 65.

## 2023-12-30 NOTE — PLAN OF CARE
Problem: Adult Inpatient Plan of Care  Goal: Plan of Care Review  Outcome: Ongoing, Progressing  Flowsheets (Taken 12/29/2023 2229)  Plan of Care Reviewed With: patient  Goal: Patient-Specific Goal (Individualized)  Outcome: Ongoing, Progressing  Goal: Absence of Hospital-Acquired Illness or Injury  Outcome: Ongoing, Progressing  Goal: Optimal Comfort and Wellbeing  Outcome: Ongoing, Progressing  Goal: Readiness for Transition of Care  Outcome: Ongoing, Progressing     Problem: Fluid and Electrolyte Imbalance (Acute Kidney Injury/Impairment)  Goal: Fluid and Electrolyte Balance  Outcome: Ongoing, Progressing     Problem: Oral Intake Inadequate (Acute Kidney Injury/Impairment)  Goal: Optimal Nutrition Intake  Outcome: Ongoing, Progressing     Problem: Renal Function Impairment (Acute Kidney Injury/Impairment)  Goal: Effective Renal Function  Outcome: Ongoing, Progressing     Problem: Impaired Wound Healing  Goal: Optimal Wound Healing  Outcome: Ongoing, Progressing     Problem: Adjustment to Illness (Heart Failure)  Goal: Optimal Coping  Outcome: Ongoing, Progressing     Problem: Cardiac Output Decreased (Heart Failure)  Goal: Optimal Cardiac Output  Outcome: Ongoing, Progressing     Problem: Dysrhythmia (Heart Failure)  Goal: Stable Heart Rate and Rhythm  Outcome: Ongoing, Progressing     Problem: Fluid Imbalance (Heart Failure)  Goal: Fluid Balance  Outcome: Ongoing, Progressing     Problem: Functional Ability Impaired (Heart Failure)  Goal: Optimal Functional Ability  Outcome: Ongoing, Progressing     Problem: Oral Intake Inadequate (Heart Failure)  Goal: Optimal Nutrition Intake  Outcome: Ongoing, Progressing     Problem: Respiratory Compromise (Heart Failure)  Goal: Effective Oxygenation and Ventilation  Outcome: Ongoing, Progressing     Problem: Sleep Disordered Breathing (Heart Failure)  Goal: Effective Breathing Pattern During Sleep  Outcome: Ongoing, Progressing

## 2023-12-30 NOTE — PROGRESS NOTES
[facility] - Cardiology [facility] Medicine  Progress Note    Patient Name: Papito Bhakta  MRN: [id number]  Patient Class: OP- Observation   Admission Date: 12/29/2023  Length of Stay: 0 days  Attending Physician: Milly Mendoza MD  Primary Care Provider: Brynn To MD        Subjective:     Principal Problem:Acute on chronic combined systolic and diastolic CHF (congestive heart failure)        HPI:  Papito Bhakta is a 68 y.o. male with NICM, combined CHF(11/2023 EF 10 -15%, G3DD) s/p ICD placement, CKD, HLD, HTN, and AMELIA who presents for bilateral lower extremity swelling and shortness of breath. Patient reports he has been having worsening shortness of breath for the past 6 months. He had acutely worsening SOB today where he described becoming profoundly dyspneic on exertion. He reported taking 2 of his 80 mg Lasix this morning with subsequent minimal urine output and minimal improvement in his SOB. He reports SOB aggravated with lying down and he requires frequent positional changes to keep comfortable. He reports additional poor appetite and urine output for the past week although he reports he has been drinking well. He had 2 episodes of nausea/vomiting this past week as well. He denies fever/chills, chest pain, abdominal pain, recent illness, medication changes. Patient reports adherence with all medications. He reports he lives with his daughter who helps him with his medications and healthcare.    Additionally he reports chronic leg pain from a chronic RLE wound. He went to wound care yesterday where dressings were changed and he was told they were healing well. He has bilateral lower extremity edema R>L that is typical for him and he denies recent worsening.    Overview/Hospital Course:  Pt admitted to hospital medicine for acute heart failure exacerbation. Continuing diuresis. Also complaining of inability to swallow for 3 months duration. Consulting SLP and GI today.    Interval History:  Nausea and vomiting this morning. Also noted a 3 month history of difficulty swallowing solid food. Afebrile overnight and HDS.     Review of Systems   Constitutional:  Positive for appetite change.   Respiratory:  Positive for shortness of breath.    Gastrointestinal:         Difficulty swallowing     Objective:     Vital Signs (Most Recent):  Temp: 98.1 °F (36.7 °C) (12/30/23 0750)  Pulse: 72 (12/30/23 0750)  Resp: 18 (12/30/23 0750)  BP: 102/62 (12/30/23 0750)  SpO2: 97 % (12/30/23 0750) Vital Signs (24h Range):  Temp:  [97.5 °F (36.4 °C)-98.1 °F (36.7 °C)] 98.1 °F (36.7 °C)  Pulse:  [60-77] 72  Resp:  [16-24] 18  SpO2:  [95 %-100 %] 97 %  BP: (102-124)/(62-84) 102/62     Weight: 112.9 kg (248 lb 14.4 oz)  Body mass index is 29.52 kg/m².    Intake/Output Summary (Last 24 hours) at 12/30/2023 1104  Last data filed at 12/30/2023 0940  Gross per 24 hour   Intake --   Output 600 ml   Net -600 ml         Physical Exam  Vitals and nursing note reviewed.   Constitutional:       Appearance: He is ill-appearing.   HENT:      Head: Normocephalic and atraumatic.      Mouth/Throat:      Mouth: Mucous membranes are moist.   Eyes:      General: Scleral icterus present.      Extraocular Movements: Extraocular movements intact.      Comments: Anisocoria   Cardiovascular:      Rate and Rhythm: Normal rate and regular rhythm.      Heart sounds:      Gallop present.   Pulmonary:      Effort: Pulmonary effort is normal. No respiratory distress.      Breath sounds: Normal breath sounds. No wheezing.   Abdominal:      General: Abdomen is flat. There is no distension.      Palpations: Abdomen is soft.      Tenderness: There is no abdominal tenderness.   Musculoskeletal:      Left lower leg: Tenderness present. Edema present.      Comments: Wound dressings in place on RLE   Skin:     General: Skin is warm and dry.   Neurological:      Mental Status: He is alert and oriented to person, place, and time.   Psychiatric:         Mood and  Affect: Mood normal.         Behavior: Behavior normal.             Significant Labs: All pertinent labs within the past 24 hours have been reviewed.    Significant Imaging: I have reviewed all pertinent imaging results/findings within the past 24 hours.    Assessment/Plan:      * Acute on chronic combined systolic and diastolic CHF (congestive heart failure)  Patient is identified as having Combined Systolic and Diastolic heart failure that is Acute on chronic. CHF is currently uncontrolled due to Pulmonary edema/pleural effusion on CXR. Latest ECHO performed and demonstrates- Results for orders placed during the hospital encounter of 11/10/23    Echo    Interpretation Summary    Left Ventricle: The left ventricle is severely dilated. Mildly increased wall thickness. There is mild concentric hypertrophy. Severe global hypokinesis present. There is severely reduced systolic function with a visually estimated ejection fraction of 10 -15%. Grade III diastolic dysfunction.    Right Ventricle: Severe right ventricular enlargement. Systolic function is severely reduced.    Mitral Valve: There is no stenosis. There is mild to moderate regurgitation with a centrally directed jet.    Tricuspid Valve: There is mild to moderate regurgitation.    Pulmonary Artery: The estimated pulmonary artery systolic pressure is 67 mmHg.  .Last BNP reviewed- and noted below   Recent Labs   Lab 12/29/23  1604   BNP 3,067*         Presented for acute on chronic SOB with associated low UOP. Afebrile HDS. BNP 3,067, Troponin 0.031. CXR with cardiomegaly, vascular congestion, and edema. Received 100 of Lasix in the ED.    - IV lasix 80 BID. If urine output suboptimal consider increasing dose or additional diuretics in combination  - continue home Jardiance   - Beta blocker/aldactone held given soft pressures, resume when appropriate  - Cardiac diet with Fluid restriction at 1.5L with strict I/Os and daily STANDING weights  - Check Electrolytes,  keep Mag >2 & K+ >4  - SCDs, Ambulate as tolerated    - Strict I&Os, Daily standing weights  - Review Low-salt diet and enforce medication adherence on discharge    Pupil asymmetry  Notable asymetry on pupils by patient. Chart review shows left orbital trauma in 2021 with notes concerned for dilation and vision changes. When talking to daughter, this is chronic. States no vision changes or neuro symptoms whatsoever.     Nausea and vomiting  Complaints of several day history of N/V on admission. Also noted 3 month history of difficulty swallowing solid foods.    - Consulted SLP  - Consider GI consult      Acute renal failure superimposed on stage 3a chronic kidney disease  Creatinine 2.1 on admit, baseline around 1.4. Likely prerenal etiology given urine sodium and FENa.  Recent Labs     12/29/23  1604 12/30/23  0635   BUN 54* 59*   CREATININE 2.1* 2.4*         Estimated Creatinine Clearance: 41.1 mL/min (A) (based on SCr of 2.4 mg/dL (H)).    - Renal US: renal cysts, no hydronephrosis  - Urine Na: 14, Pr/Cr: 0.72; FENa 0.2%  - Monitor urine output and serial BMP and adjust therapy as needed.   - Strict I&Os and daily weights   - Avoid nephrotoxic agents such as NSAIDs, gadolinium and IV radiocontrast.  - Renally dose meds to current GFR.  - Maintain MAP > 65.    AMELIA (obstructive sleep apnea)  CPAP QHS      Biventricular ICD (implantable cardioverter-defibrillator) in place  Stable no acute issues, he denies discharge. QTc chronically prolonged. Continue home amiodarone for NSVT prevention. Monitor on telemetry       Hyperlipidemia  Stable. Continue Home Pravastatin.        Essential hypertension  Chronic, controlled. Latest blood pressure and vitals reviewed-     Temp:  [97.5 °F (36.4 °C)-98.1 °F (36.7 °C)]   Pulse:  [60-77]   Resp:  [16-24]   BP: (102-124)/(62-84)   SpO2:  [95 %-100 %] .   Home meds for hypertension were reviewed and noted below.   Hypertension Medications               furosemide (LASIX) 80 MG  tablet TAKE 1 TABLET EVERY DAY    metoprolol succinate (TOPROL-XL) 50 MG 24 hr tablet TAKE 1 TABLET EVERY DAY    spironolactone (ALDACTONE) 25 MG tablet TAKE 1/2 TABLET (12.5 MG TOTAL) ONCE DAILY. HOLD IF SYSTOLIC BLOOD PRESSURE IS LESS THAN 110            While in the hospital, will manage blood pressure as follows; Adjust home antihypertensive regimen as follows- Holding in setting of borderline pressures in setting of new diuresis     Will utilize p.r.n. blood pressure medication only if patient's blood pressure greater than 180/110 and he develops symptoms such as worsening chest pain or shortness of breath.      VTE Risk Mitigation (From admission, onward)           Ordered     heparin (porcine) injection 5,000 Units  Every 8 hours         12/29/23 1759     IP VTE HIGH RISK PATIENT  Once         12/29/23 1759     Place sequential compression device  Until discontinued         12/29/23 1759                    Discharge Planning   MARIIA:      Code Status: Full Code   Is the patient medically ready for discharge?:     Reason for patient still in hospital (select all that apply): Treatment and Consult recommendations                     Deepa Holly MD  Department of Hospital Medicine   [facility] - Cardiology [place]

## 2023-12-30 NOTE — SUBJECTIVE & OBJECTIVE
Interval History: Nausea and vomiting this morning. Also noted a 3 month history of difficulty swallowing solid food. Afebrile overnight and HDS.     Review of Systems   Constitutional:  Positive for appetite change.   Respiratory:  Positive for shortness of breath.    Gastrointestinal:         Difficulty swallowing     Objective:     Vital Signs (Most Recent):  Temp: 98.1 °F (36.7 °C) (12/30/23 0750)  Pulse: 72 (12/30/23 0750)  Resp: 18 (12/30/23 0750)  BP: 102/62 (12/30/23 0750)  SpO2: 97 % (12/30/23 0750) Vital Signs (24h Range):  Temp:  [97.5 °F (36.4 °C)-98.1 °F (36.7 °C)] 98.1 °F (36.7 °C)  Pulse:  [60-77] 72  Resp:  [16-24] 18  SpO2:  [95 %-100 %] 97 %  BP: (102-124)/(62-84) 102/62     Weight: 112.9 kg (248 lb 14.4 oz)  Body mass index is 29.52 kg/m².    Intake/Output Summary (Last 24 hours) at 12/30/2023 1104  Last data filed at 12/30/2023 0940  Gross per 24 hour   Intake --   Output 600 ml   Net -600 ml         Physical Exam  Vitals and nursing note reviewed.   Constitutional:       Appearance: He is ill-appearing.   HENT:      Head: Normocephalic and atraumatic.      Mouth/Throat:      Mouth: Mucous membranes are moist.   Eyes:      General: Scleral icterus present.      Extraocular Movements: Extraocular movements intact.      Comments: Anisocoria   Cardiovascular:      Rate and Rhythm: Normal rate and regular rhythm.      Heart sounds:      Gallop present.   Pulmonary:      Effort: Pulmonary effort is normal. No respiratory distress.      Breath sounds: Normal breath sounds. No wheezing.   Abdominal:      General: Abdomen is flat. There is no distension.      Palpations: Abdomen is soft.      Tenderness: There is no abdominal tenderness.   Musculoskeletal:      Left lower leg: Tenderness present. Edema present.      Comments: Wound dressings in place on RLE   Skin:     General: Skin is warm and dry.   Neurological:      Mental Status: He is alert and oriented to person, place, and time.   Psychiatric:          Mood and Affect: Mood normal.         Behavior: Behavior normal.             Significant Labs: All pertinent labs within the past 24 hours have been reviewed.    Significant Imaging: I have reviewed all pertinent imaging results/findings within the past 24 hours.

## 2023-12-30 NOTE — H&P
[facility] - Emergency Dept  [facility] Medicine  History & Physical    Patient Name: Papito Bhakta  MRN: [id number]  Patient Class: OP- Observation  Admission Date: 12/29/2023  Attending Physician: Milly Mendoza MD   Primary Care Provider: Brynn To MD         Patient information was obtained from patient, past medical records, and ER records.     Subjective:     Principal Problem:Acute on chronic combined systolic and diastolic CHF (congestive heart failure)    Chief Complaint:   Chief Complaint   Patient presents with    Leg Swelling     Fatigue and worsening lower extremity edema for past couple days, c exertional dyspnea, unable to walk now.        HPI: Papito Bhakta is a 68 y.o. male with NICM, combined CHF(11/2023 EF 10 -15%, G3DD) s/p ICD placement, CKD, HLD, HTN, and AMELIA who presents for bilateral lower extremity swelling and shortness of breath. Patient reports he has been having worsening shortness of breath for the past 6 months. He had acutely worsening SOB today where he described becoming profoundly dyspneic on exertion. He reported taking 2 of his 80 mg Lasix this morning with subsequent minimal urine output and minimal improvement in his SOB. He reports SOB aggravated with lying down and he requires frequent positional changes to keep comfortable. He reports additional poor appetite and urine output for the past week although he reports he has been drinking well. He had 2 episodes of nausea/vomiting this past week as well. He denies fever/chills, chest pain, abdominal pain, recent illness, medication changes. Patient reports adherence with all medications. He reports he lives with his daughter who helps him with his medications and healthcare.    Additionally he reports chronic leg pain from a chronic RLE wound. He went to wound care yesterday where dressings were changed and he was told they were healing well. He has bilateral lower extremity edema R>L that is typical for him and he denies  recent worsening.    Past Medical History:   Diagnosis Date    RIGOBERTO (acute kidney injury) 10/7/2020    Anticoagulant long-term use     Aspirin    CHF (congestive heart failure)     Hyperlipidemia     Hypertension     NICM (nonischemic cardiomyopathy) 6/16/2020    Obesity     AMELIA (obstructive sleep apnea) 1/7/2022    Peripheral vascular disease, unspecified 2/1/2021       Past Surgical History:   Procedure Laterality Date    INSERTION OF BIVENTRICULAR IMPLANTABLE CARDIOVERTER-DEFIBRILLATOR (ICD) N/A 02/13/2019    Procedure: INSERTION, ICD, BIVENTRICULAR;  Surgeon: Shailesh Eng MD;  Location: [facility] CATH LAB;  Service: Cardiology;  Laterality: N/A;  RN PRE OP 2-6-19  1ST CASE PER  RADHA. NOTIFIED RADHA THAT ANESTHESIA IS NOT PITO FOR 1ST CASE START-LO    INSERTION OF BIVENTRICULAR IMPLANTABLE CARDIOVERTER-DEFIBRILLATOR (ICD) N/A 09/28/2020    Procedure: INSERTION, ICD, BIVENTRICULAR;  Surgeon: Jim Kwong MD;  Location: [facility] EP LAB;  Service: Cardiology;  Laterality: N/A;  NICM, CRT-D, SJM,, MAC, DM, 3 Prep*Wearing LifeVest*    oral extraction  11/2018    TESTICLE SURGERY         Review of patient's allergies indicates:   Allergen Reactions    Ramipril      Leg swelling and gynecomastia     Losartan Rash       No current facility-administered medications on file prior to encounter.     Current Outpatient Medications on File Prior to Encounter   Medication Sig    amiodarone (PACERONE) 200 MG Tab Take 1 tablet (200 mg total) by mouth once daily.    aspirin (ECOTRIN) 325 MG EC tablet Take 1 tablet (325 mg total) by mouth once daily.    empagliflozin (JARDIANCE) 10 mg tablet Take 1 tablet (10 mg total) by mouth once daily.    furosemide (LASIX) 80 MG tablet TAKE 1 TABLET EVERY DAY    metoprolol succinate (TOPROL-XL) 50 MG 24 hr tablet TAKE 1 TABLET EVERY DAY    potassium chloride (K-TAB) 20 mEq TAKE 1 TABLET EVERY DAY    pravastatin (PRAVACHOL) 80 MG tablet TAKE 1 TABLET EVERY DAY    spironolactone (ALDACTONE) 25 MG  tablet TAKE 1/2 TABLET (12.5 MG TOTAL) ONCE DAILY. HOLD IF SYSTOLIC BLOOD PRESSURE IS LESS THAN 110    acetaminophen (TYLENOL) 500 MG tablet Take 2 tablets (1,000 mg total) by mouth every 8 (eight) hours as needed for Pain or Temperature greater than (100.5).    [DISCONTINUED] ramipril (ALTACE) 10 MG capsule Take 1 capsule (10 mg total) by mouth once daily.     Family History       Problem Relation (Age of Onset)    Epilepsy Mother          Tobacco Use    Smoking status: Former     Current packs/day: 0.00     Average packs/day: 0.5 packs/day for 40.0 years (20.0 ttl pk-yrs)     Types: Cigarettes, Cigars     Start date:      Quit date:      Years since quittin.9     Passive exposure: Current    Smokeless tobacco: Never   Substance and Sexual Activity    Alcohol use: Yes     Comment: once a month beer or liquor    Drug use: No    Sexual activity: Not Currently     Birth control/protection: None     Comment: uses protection sometimes     Review of Systems   Constitutional:  Positive for appetite change. Negative for chills and fever.   Eyes:  Negative for visual disturbance.   Respiratory:  Positive for shortness of breath.    Cardiovascular:  Positive for leg swelling (chronic). Negative for chest pain.   Gastrointestinal:  Negative for abdominal pain, nausea and vomiting.   Genitourinary:  Positive for difficulty urinating.   Musculoskeletal:         Leg pain   Neurological:  Negative for light-headedness and headaches.     Objective:     Vital Signs (Most Recent):  Temp: 97.5 °F (36.4 °C) (23)  Pulse: 74 (23)  Resp: (!) 24 (23)  BP: 110/75 (23)  SpO2: 100 % (23) Vital Signs (24h Range):  Temp:  [97.5 °F (36.4 °C)-98 °F (36.7 °C)] 97.5 °F (36.4 °C)  Pulse:  [74-76] 74  Resp:  [16-24] 24  SpO2:  [98 %-100 %] 100 %  BP: (103-110)/(72-75) 110/75     Weight: 113.4 kg (250 lb)  Body mass index is 29.65 kg/m².     Physical Exam  Vitals and nursing note  reviewed.   Constitutional:       General: He is not in acute distress.     Appearance: He is ill-appearing. He is not toxic-appearing or diaphoretic.   HENT:      Head: Normocephalic and atraumatic.      Right Ear: External ear normal.      Left Ear: External ear normal.      Mouth/Throat:      Mouth: Mucous membranes are moist.   Eyes:      General: Scleral icterus present.         Right eye: No discharge.         Left eye: No discharge.      Comments: Anisocoria   Cardiovascular:      Rate and Rhythm: Normal rate and regular rhythm.      Heart sounds: No murmur heard.     Gallop present.   Pulmonary:      Effort: Pulmonary effort is normal. No respiratory distress.      Breath sounds: No wheezing or rales.   Abdominal:      General: Abdomen is flat. Bowel sounds are normal. There is no distension.      Palpations: Abdomen is soft.      Tenderness: There is no abdominal tenderness. There is no guarding.   Musculoskeletal:         General: No deformity.      Cervical back: No rigidity.      Right lower leg: Edema present.      Left lower leg: Edema present.      Comments: RLE wound dressings in place   Skin:     General: Skin is dry.      Coloration: Skin is not jaundiced.      Comments: Cool extremities   Neurological:      Mental Status: He is alert and oriented to person, place, and time. Mental status is at baseline.   Psychiatric:         Mood and Affect: Mood normal.         Behavior: Behavior normal.                Significant Labs: All pertinent labs within the past 24 hours have been reviewed.  CBC:   Recent Labs   Lab 12/29/23  1604   WBC 6.10   HGB 13.8*   HCT 43.0   *     CMP:   Recent Labs   Lab 12/29/23  1604   *   K 4.8   CL 96   CO2 21*   GLU 93   BUN 54*   CREATININE 2.1*   CALCIUM 10.4   PROT 7.3   ALBUMIN 3.0*   BILITOT 6.0*   ALKPHOS 91   AST 30   ALT 25   ANIONGAP 17*       Significant Imaging: I have reviewed all pertinent imaging results/findings within the past 24  hours.  Assessment/Plan:     * Acute on chronic combined systolic and diastolic CHF (congestive heart failure)  Patient is identified as having Combined Systolic and Diastolic heart failure that is Acute on chronic. CHF is currently uncontrolled due to Pulmonary edema/pleural effusion on CXR. Latest ECHO performed and demonstrates- Results for orders placed during the hospital encounter of 11/10/23    Echo    Interpretation Summary    Left Ventricle: The left ventricle is severely dilated. Mildly increased wall thickness. There is mild concentric hypertrophy. Severe global hypokinesis present. There is severely reduced systolic function with a visually estimated ejection fraction of 10 -15%. Grade III diastolic dysfunction.    Right Ventricle: Severe right ventricular enlargement. Systolic function is severely reduced.    Mitral Valve: There is no stenosis. There is mild to moderate regurgitation with a centrally directed jet.    Tricuspid Valve: There is mild to moderate regurgitation.    Pulmonary Artery: The estimated pulmonary artery systolic pressure is 67 mmHg.  .Last BNP reviewed- and noted below   Recent Labs   Lab 12/29/23  1604   BNP 3,067*       Presented for acute on chronic SOB with associated low UOP. Afebrile HDS. BNP 3,067, Troponin 0.031. CXR with cardiomegaly, vascular congestion, and edema. Received 100 of Lasix in the ED.    Plan:   - IV lasix 80 BID. If urine output suboptimal consider increasing dose or additional diuretics in combination  - continue home Jardiance   - Beta blocker/aldactone held given soft pressures, resume when appropriate  - Cardiac diet with Fluid restriction at 1.5L with strict I/Os and daily STANDING weights  - Check Electrolytes, keep Mag >2 & K+ >4  - SCDs, Ambulate as tolerated    - Strict I&Os, Daily standing weights  - Review Low-salt diet and enforce medication adherence on discharge    Acute renal failure superimposed on stage 3a chronic kidney disease  Creatinine  2.1 on admit, baseline around 1.4  Recent Labs     12/29/23  1604   BUN 54*   CREATININE 2.1*       Estimated Creatinine Clearance: 47 mL/min (A) (based on SCr of 2.1 mg/dL (H)).    Plan:   - Monitor urine output and serial BMP and adjust therapy as needed.   - Check urine lytes and renal/retroperitoneal ultrasound  - Check urine protein creatinine ratio.  - Strict I&Os and daily weights   - Avoid nephrotoxic agents such as NSAIDs, gadolinium and IV radiocontrast.  - Renally dose meds to current GFR.  - Maintain MAP > 65.          Pupil asymmetry  Notable asymetry on pupils by patient. Chart review shows left orbital trauma in 2021 with notes concerned for dilation and vision changes. When talking to daughter, this is chronic. States no vision changes or neuro symptoms whatsoever.     AMELIA (obstructive sleep apnea)  CPAP QHS      Biventricular ICD (implantable cardioverter-defibrillator) in place  Stable no acute issues, he denies discharge. QTc chronically prolonged. Continue home amiodarone for NSVT prevention. Monitor on telemetry       Hyperlipidemia  Stable. Continue Home Pravastatin.        Essential hypertension  Chronic, controlled. Latest blood pressure and vitals reviewed-     Temp:  [97.5 °F (36.4 °C)-98 °F (36.7 °C)]   Pulse:  [74-76]   Resp:  [16-24]   BP: (103-110)/(72-75)   SpO2:  [98 %-100 %] .   Home meds for hypertension were reviewed and noted below.   Hypertension Medications               furosemide (LASIX) 80 MG tablet TAKE 1 TABLET EVERY DAY    metoprolol succinate (TOPROL-XL) 50 MG 24 hr tablet TAKE 1 TABLET EVERY DAY    spironolactone (ALDACTONE) 25 MG tablet TAKE 1/2 TABLET (12.5 MG TOTAL) ONCE DAILY. HOLD IF SYSTOLIC BLOOD PRESSURE IS LESS THAN 110            While in the hospital, will manage blood pressure as follows; Adjust home antihypertensive regimen as follows- Holding in setting of borderline pressures in setting of new diuresis     Will utilize p.r.n. blood pressure medication only  if patient's blood pressure greater than 180/110 and he develops symptoms such as worsening chest pain or shortness of breath.      VTE Risk Mitigation (From admission, onward)           Ordered     heparin (porcine) injection 5,000 Units  Every 8 hours         12/29/23 1759     IP VTE HIGH RISK PATIENT  Once         12/29/23 1759     Place sequential compression device  Until discontinued         12/29/23 1759                           On 12/29/2023, patient should be placed in hospital observation services under my care in collaboration with Dr. Mendoza.         Chavo Alvarenga MD  Department of Hospital Medicine  [facility] - Emergency Dept

## 2023-12-30 NOTE — ASSESSMENT & PLAN NOTE
Patient is identified as having Combined Systolic and Diastolic heart failure that is Acute on chronic. CHF is currently uncontrolled due to Pulmonary edema/pleural effusion on CXR. Latest ECHO performed and demonstrates- Results for orders placed during the hospital encounter of 11/10/23    Echo    Interpretation Summary    Left Ventricle: The left ventricle is severely dilated. Mildly increased wall thickness. There is mild concentric hypertrophy. Severe global hypokinesis present. There is severely reduced systolic function with a visually estimated ejection fraction of 10 -15%. Grade III diastolic dysfunction.    Right Ventricle: Severe right ventricular enlargement. Systolic function is severely reduced.    Mitral Valve: There is no stenosis. There is mild to moderate regurgitation with a centrally directed jet.    Tricuspid Valve: There is mild to moderate regurgitation.    Pulmonary Artery: The estimated pulmonary artery systolic pressure is 67 mmHg.  .Last BNP reviewed- and noted below   Recent Labs   Lab 12/29/23  1604   BNP 3,067*       Presented for acute on chronic SOB with associated low UOP. Afebrile HDS. BNP 3,067, Troponin 0.031. CXR with cardiomegaly, vascular congestion, and edema. Received 100 of Lasix in the ED.    Plan:   - IV lasix 80 BID. If urine output suboptimal consider increasing dose or additional diuretics in combination  - continue home Jardiance   - Beta blocker/aldactone held given soft pressures, resume when appropriate  - Cardiac diet with Fluid restriction at 1.5L with strict I/Os and daily STANDING weights  - Check Electrolytes, keep Mag >2 & K+ >4  - SCDs, Ambulate as tolerated    - Strict I&Os, Daily standing weights  - Review Low-salt diet and enforce medication adherence on discharge

## 2023-12-31 PROBLEM — D50.9 MICROCYTIC ANEMIA: Status: ACTIVE | Noted: 2023-12-31

## 2023-12-31 PROBLEM — R13.10 DYSPHAGIA: Status: ACTIVE | Noted: 2023-12-31

## 2023-12-31 LAB
ADENOVIRUS: NOT DETECTED
ALBUMIN SERPL BCP-MCNC: 2.7 G/DL (ref 3.5–5.2)
ALP SERPL-CCNC: 93 U/L (ref 55–135)
ALT SERPL W/O P-5'-P-CCNC: 37 U/L (ref 10–44)
ANION GAP SERPL CALC-SCNC: 11 MMOL/L (ref 8–16)
ANION GAP SERPL CALC-SCNC: 14 MMOL/L (ref 8–16)
AST SERPL-CCNC: 53 U/L (ref 10–40)
BILIRUB SERPL-MCNC: 5.8 MG/DL (ref 0.1–1)
BORDETELLA PARAPERTUSSIS (IS1001): NOT DETECTED
BORDETELLA PERTUSSIS (PTXP): NOT DETECTED
BUN SERPL-MCNC: 61 MG/DL (ref 8–23)
BUN SERPL-MCNC: 69 MG/DL (ref 8–23)
CALCIUM SERPL-MCNC: 9.4 MG/DL (ref 8.7–10.5)
CALCIUM SERPL-MCNC: 9.8 MG/DL (ref 8.7–10.5)
CHLAMYDIA PNEUMONIAE: NOT DETECTED
CHLORIDE SERPL-SCNC: 100 MMOL/L (ref 95–110)
CHLORIDE SERPL-SCNC: 98 MMOL/L (ref 95–110)
CK SERPL-CCNC: 38 U/L (ref 20–200)
CO2 SERPL-SCNC: 24 MMOL/L (ref 23–29)
CO2 SERPL-SCNC: 24 MMOL/L (ref 23–29)
CORONAVIRUS 229E, COMMON COLD VIRUS: NOT DETECTED
CORONAVIRUS HKU1, COMMON COLD VIRUS: NOT DETECTED
CORONAVIRUS NL63, COMMON COLD VIRUS: NOT DETECTED
CORONAVIRUS OC43, COMMON COLD VIRUS: NOT DETECTED
CREAT SERPL-MCNC: 2 MG/DL (ref 0.5–1.4)
CREAT SERPL-MCNC: 2.4 MG/DL (ref 0.5–1.4)
ERYTHROCYTE [DISTWIDTH] IN BLOOD BY AUTOMATED COUNT: 21.8 % (ref 11.5–14.5)
EST. GFR  (NO RACE VARIABLE): 28.7 ML/MIN/1.73 M^2
EST. GFR  (NO RACE VARIABLE): 35.7 ML/MIN/1.73 M^2
FLUBV RNA NPH QL NAA+NON-PROBE: NOT DETECTED
GLUCOSE SERPL-MCNC: 119 MG/DL (ref 70–110)
GLUCOSE SERPL-MCNC: 122 MG/DL (ref 70–110)
HCT VFR BLD AUTO: 40.1 % (ref 40–54)
HGB BLD-MCNC: 13.5 G/DL (ref 14–18)
HPIV1 RNA NPH QL NAA+NON-PROBE: NOT DETECTED
HPIV2 RNA NPH QL NAA+NON-PROBE: NOT DETECTED
HPIV3 RNA NPH QL NAA+NON-PROBE: NOT DETECTED
HPIV4 RNA NPH QL NAA+NON-PROBE: NOT DETECTED
HUMAN METAPNEUMOVIRUS: NOT DETECTED
INFLUENZA A (SUBTYPES H1,H1-2009,H3): NOT DETECTED
MAGNESIUM SERPL-MCNC: 2.1 MG/DL (ref 1.6–2.6)
MAGNESIUM SERPL-MCNC: 2.2 MG/DL (ref 1.6–2.6)
MCH RBC QN AUTO: 23.8 PG (ref 27–31)
MCHC RBC AUTO-ENTMCNC: 33.7 G/DL (ref 32–36)
MCV RBC AUTO: 71 FL (ref 82–98)
MYCOPLASMA PNEUMONIAE: NOT DETECTED
PHOSPHATE SERPL-MCNC: 3.5 MG/DL (ref 2.7–4.5)
PLATELET # BLD AUTO: 135 K/UL (ref 150–450)
PMV BLD AUTO: ABNORMAL FL (ref 9.2–12.9)
POCT GLUCOSE: 124 MG/DL (ref 70–110)
POTASSIUM SERPL-SCNC: 3.8 MMOL/L (ref 3.5–5.1)
POTASSIUM SERPL-SCNC: 4 MMOL/L (ref 3.5–5.1)
PROT SERPL-MCNC: 6.6 G/DL (ref 6–8.4)
RBC # BLD AUTO: 5.67 M/UL (ref 4.6–6.2)
RESPIRATORY INFECTION PANEL SOURCE: ABNORMAL
RSV RNA NPH QL NAA+NON-PROBE: NOT DETECTED
RV+EV RNA NPH QL NAA+NON-PROBE: NOT DETECTED
SARS-COV-2 RNA RESP QL NAA+PROBE: DETECTED
SODIUM SERPL-SCNC: 133 MMOL/L (ref 136–145)
SODIUM SERPL-SCNC: 138 MMOL/L (ref 136–145)
TROPONIN I SERPL DL<=0.01 NG/ML-MCNC: 0.04 NG/ML (ref 0–0.03)
WBC # BLD AUTO: 14.95 K/UL (ref 3.9–12.7)

## 2023-12-31 PROCEDURE — 63600175 PHARM REV CODE 636 W HCPCS

## 2023-12-31 PROCEDURE — 96372 THER/PROPH/DIAG INJ SC/IM: CPT

## 2023-12-31 PROCEDURE — G0378 HOSPITAL OBSERVATION PER HR: HCPCS

## 2023-12-31 PROCEDURE — 84484 ASSAY OF TROPONIN QUANT: CPT | Performed by: INTERNAL MEDICINE

## 2023-12-31 PROCEDURE — 94660 CPAP INITIATION&MGMT: CPT

## 2023-12-31 PROCEDURE — 25000242 PHARM REV CODE 250 ALT 637 W/ HCPCS

## 2023-12-31 PROCEDURE — 87040 BLOOD CULTURE FOR BACTERIA: CPT | Mod: 59 | Performed by: STUDENT IN AN ORGANIZED HEALTH CARE EDUCATION/TRAINING PROGRAM

## 2023-12-31 PROCEDURE — 87798 DETECT AGENT NOS DNA AMP: CPT | Performed by: STUDENT IN AN ORGANIZED HEALTH CARE EDUCATION/TRAINING PROGRAM

## 2023-12-31 PROCEDURE — 36415 COLL VENOUS BLD VENIPUNCTURE: CPT | Mod: XB | Performed by: INTERNAL MEDICINE

## 2023-12-31 PROCEDURE — 93005 ELECTROCARDIOGRAM TRACING: CPT

## 2023-12-31 PROCEDURE — 25000003 PHARM REV CODE 250: Performed by: STUDENT IN AN ORGANIZED HEALTH CARE EDUCATION/TRAINING PROGRAM

## 2023-12-31 PROCEDURE — 27000190 HC CPAP FULL FACE MASK W/VALVE

## 2023-12-31 PROCEDURE — 80053 COMPREHEN METABOLIC PANEL: CPT | Performed by: STUDENT IN AN ORGANIZED HEALTH CARE EDUCATION/TRAINING PROGRAM

## 2023-12-31 PROCEDURE — 25000003 PHARM REV CODE 250

## 2023-12-31 PROCEDURE — 5A09357 ASSISTANCE WITH RESPIRATORY VENTILATION, LESS THAN 24 CONSECUTIVE HOURS, CONTINUOUS POSITIVE AIRWAY PRESSURE: ICD-10-PCS | Performed by: HOSPITALIST

## 2023-12-31 PROCEDURE — 93005 ELECTROCARDIOGRAM TRACING: CPT | Performed by: INTERNAL MEDICINE

## 2023-12-31 PROCEDURE — 27100171 HC OXYGEN HIGH FLOW UP TO 24 HOURS

## 2023-12-31 PROCEDURE — 99900035 HC TECH TIME PER 15 MIN (STAT)

## 2023-12-31 PROCEDURE — 84100 ASSAY OF PHOSPHORUS: CPT | Performed by: STUDENT IN AN ORGANIZED HEALTH CARE EDUCATION/TRAINING PROGRAM

## 2023-12-31 PROCEDURE — 83735 ASSAY OF MAGNESIUM: CPT | Mod: 91 | Performed by: INTERNAL MEDICINE

## 2023-12-31 PROCEDURE — 83735 ASSAY OF MAGNESIUM: CPT | Performed by: STUDENT IN AN ORGANIZED HEALTH CARE EDUCATION/TRAINING PROGRAM

## 2023-12-31 PROCEDURE — 80048 BASIC METABOLIC PNL TOTAL CA: CPT | Mod: XB | Performed by: INTERNAL MEDICINE

## 2023-12-31 PROCEDURE — 85027 COMPLETE CBC AUTOMATED: CPT | Performed by: STUDENT IN AN ORGANIZED HEALTH CARE EDUCATION/TRAINING PROGRAM

## 2023-12-31 PROCEDURE — 63600175 PHARM REV CODE 636 W HCPCS: Mod: JZ,TB | Performed by: STUDENT IN AN ORGANIZED HEALTH CARE EDUCATION/TRAINING PROGRAM

## 2023-12-31 PROCEDURE — 93010 ELECTROCARDIOGRAM REPORT: CPT | Mod: 76,,, | Performed by: INTERNAL MEDICINE

## 2023-12-31 PROCEDURE — 99223 1ST HOSP IP/OBS HIGH 75: CPT | Mod: ,,, | Performed by: INTERNAL MEDICINE

## 2023-12-31 PROCEDURE — 36415 COLL VENOUS BLD VENIPUNCTURE: CPT | Mod: XB | Performed by: STUDENT IN AN ORGANIZED HEALTH CARE EDUCATION/TRAINING PROGRAM

## 2023-12-31 PROCEDURE — 94761 N-INVAS EAR/PLS OXIMETRY MLT: CPT

## 2023-12-31 PROCEDURE — 82550 ASSAY OF CK (CPK): CPT | Performed by: INTERNAL MEDICINE

## 2023-12-31 RX ORDER — LANOLIN ALCOHOL/MO/W.PET/CERES
1 CREAM (GRAM) TOPICAL DAILY
Status: DISCONTINUED | OUTPATIENT
Start: 2023-12-31 | End: 2024-01-04

## 2023-12-31 RX ADMIN — FUROSEMIDE 80 MG: 10 INJECTION, SOLUTION INTRAVENOUS at 09:12

## 2023-12-31 RX ADMIN — AMIODARONE HYDROCHLORIDE 200 MG: 200 TABLET ORAL at 09:12

## 2023-12-31 RX ADMIN — REMDESIVIR 200 MG: 100 INJECTION, POWDER, LYOPHILIZED, FOR SOLUTION INTRAVENOUS at 04:12

## 2023-12-31 RX ADMIN — AMIODARONE HYDROCHLORIDE 1 MG/MIN: 1.8 INJECTION, SOLUTION INTRAVENOUS at 06:12

## 2023-12-31 RX ADMIN — FUROSEMIDE 80 MG: 10 INJECTION, SOLUTION INTRAVENOUS at 08:12

## 2023-12-31 RX ADMIN — HEPARIN SODIUM 5000 UNITS: 5000 INJECTION INTRAVENOUS; SUBCUTANEOUS at 05:12

## 2023-12-31 RX ADMIN — EMPAGLIFLOZIN 10 MG: 10 TABLET, FILM COATED ORAL at 09:12

## 2023-12-31 RX ADMIN — HEPARIN SODIUM 5000 UNITS: 5000 INJECTION INTRAVENOUS; SUBCUTANEOUS at 03:12

## 2023-12-31 RX ADMIN — FERROUS SULFATE TAB 325 MG (65 MG ELEMENTAL FE) 1 EACH: 325 (65 FE) TAB at 03:12

## 2023-12-31 RX ADMIN — ASPIRIN 325 MG: 325 TABLET, COATED ORAL at 09:12

## 2023-12-31 RX ADMIN — PRAVASTATIN SODIUM 80 MG: 40 TABLET ORAL at 09:12

## 2023-12-31 RX ADMIN — AMIODARONE HYDROCHLORIDE 150 MG: 1.5 INJECTION, SOLUTION INTRAVENOUS at 06:12

## 2023-12-31 RX ADMIN — HEPARIN SODIUM 5000 UNITS: 5000 INJECTION INTRAVENOUS; SUBCUTANEOUS at 10:12

## 2023-12-31 NOTE — ASSESSMENT & PLAN NOTE
Iron studies notable for Fe 26 and sat iron 8%. TIBC, ferritin, transferrin wnl. Likely iron deficiency anemia.    - Starting daily iron tablet  - Daily CBCs

## 2023-12-31 NOTE — ASSESSMENT & PLAN NOTE
Chronic, controlled. Latest blood pressure and vitals reviewed-     Temp:  [97 °F (36.1 °C)-98.2 °F (36.8 °C)]   Pulse:  [68-84]   Resp:  [18-20]   BP: ()/(60-73)   SpO2:  [93 %-99 %] .   Home meds for hypertension were reviewed and noted below.   Hypertension Medications               furosemide (LASIX) 80 MG tablet TAKE 1 TABLET EVERY DAY    metoprolol succinate (TOPROL-XL) 50 MG 24 hr tablet TAKE 1 TABLET EVERY DAY    spironolactone (ALDACTONE) 25 MG tablet TAKE 1/2 TABLET (12.5 MG TOTAL) ONCE DAILY. HOLD IF SYSTOLIC BLOOD PRESSURE IS LESS THAN 110            While in the hospital, will manage blood pressure as follows; Adjust home antihypertensive regimen as follows- Holding in setting of borderline pressures in setting of new diuresis     Will utilize p.r.n. blood pressure medication only if patient's blood pressure greater than 180/110 and he develops symptoms such as worsening chest pain or shortness of breath.

## 2023-12-31 NOTE — HPI
68 years old male patient with ongoing medical history of HFrEF s/p ICD placed is being consulted to gastroenterology for dysphagia from 3 months.    The patient is being seen and examined on the bedside , alert and oriented denied any nausea, vomiting, diarrhea and abdominal pain. The patient stated dysphagia initially with solids from last 3 months, now started having dysphagia with liquids associated with nausea and vomiting. The patient also stated dysphagia associated with weight loss. The patient has denied any NSAIDs use, bisphosphonate's, iron tablets and recent tetracycline antibiotic and anti-coagulants. The patient has denied any recent EGD, also being seen by the SLP for evaluation, mentioned no aspiration with liquids.   Past medical history is significant for CKD, HLD, HTN and AMELIA, currently on amiodarone, aspirin, jardiance, lasix and statin.   Colonoscopy 05/03/2005: Polyp in distal ascending colon and transverse colon size 25-30 cm.   The patient never had any repeat colonoscopy and also never had EGD. The patient has also denied any treatment for iron deficiency anemia.   During this hospitalization he is being treated for CHF exacerbation  Hbg trend 13.5, 13.8 BUN 69, 64. Bilirubin 5.8, AST 53, ALT 37.

## 2023-12-31 NOTE — SUBJECTIVE & OBJECTIVE
Interval History: NAEO. Afebrile and HDS on RA. Still having L leg pain with touch. Vomits up solid food.     Review of Systems   Gastrointestinal:  Positive for vomiting. Negative for nausea.     Objective:     Vital Signs (Most Recent):  Temp: 97.6 °F (36.4 °C) (12/31/23 1141)  Pulse: 75 (12/31/23 1141)  Resp: 20 (12/31/23 1141)  BP: 90/60 (12/31/23 1141)  SpO2: 97 % (12/31/23 1141) Vital Signs (24h Range):  Temp:  [97 °F (36.1 °C)-98.2 °F (36.8 °C)] 97.6 °F (36.4 °C)  Pulse:  [68-84] 75  Resp:  [18-20] 20  SpO2:  [93 %-99 %] 97 %  BP: ()/(60-73) 90/60     Weight: 108.7 kg (239 lb 10.2 oz)  Body mass index is 28.42 kg/m².    Intake/Output Summary (Last 24 hours) at 12/31/2023 1207  Last data filed at 12/31/2023 1009  Gross per 24 hour   Intake 718 ml   Output 1675 ml   Net -957 ml         Physical Exam  Vitals and nursing note reviewed.   Constitutional:       Appearance: He is ill-appearing.   HENT:      Head: Normocephalic and atraumatic.      Mouth/Throat:      Mouth: Mucous membranes are moist.   Eyes:      General: Scleral icterus present.      Extraocular Movements: Extraocular movements intact.      Comments: Anisocoria   Cardiovascular:      Rate and Rhythm: Normal rate and regular rhythm.      Heart sounds:      Gallop present.   Pulmonary:      Effort: Pulmonary effort is normal. No respiratory distress.      Breath sounds: Normal breath sounds. No wheezing.   Abdominal:      General: Abdomen is flat. There is no distension.      Palpations: Abdomen is soft.      Tenderness: There is no abdominal tenderness.   Musculoskeletal:      Left lower leg: Tenderness present. Edema present.      Comments: Wound dressings on RLE   Skin:     General: Skin is warm and dry.   Neurological:      Mental Status: He is alert and oriented to person, place, and time.   Psychiatric:         Mood and Affect: Mood normal.         Behavior: Behavior normal.             Significant Labs: All pertinent labs within the past  24 hours have been reviewed.    Significant Imaging: I have reviewed all pertinent imaging results/findings within the past 24 hours.

## 2023-12-31 NOTE — ASSESSMENT & PLAN NOTE
Patient is identified as having Combined Systolic and Diastolic heart failure that is Acute on chronic. CHF is currently uncontrolled due to Pulmonary edema/pleural effusion on CXR. Latest ECHO performed and demonstrates- Results for orders placed during the hospital encounter of 11/10/23    Echo    Interpretation Summary    Left Ventricle: The left ventricle is severely dilated. Mildly increased wall thickness. There is mild concentric hypertrophy. Severe global hypokinesis present. There is severely reduced systolic function with a visually estimated ejection fraction of 10 -15%. Grade III diastolic dysfunction.    Right Ventricle: Severe right ventricular enlargement. Systolic function is severely reduced.    Mitral Valve: There is no stenosis. There is mild to moderate regurgitation with a centrally directed jet.    Tricuspid Valve: There is mild to moderate regurgitation.    Pulmonary Artery: The estimated pulmonary artery systolic pressure is 67 mmHg.  .Last BNP reviewed- and noted below   Recent Labs   Lab 12/29/23  1604   BNP 3,067*         Presented for acute on chronic SOB with associated low UOP. Afebrile HDS. BNP 3,067, Troponin 0.031. CXR with cardiomegaly, vascular congestion, and edema. Received 100 of Lasix in the ED.    - RIP pending  - IV lasix 80 BID. If urine output suboptimal consider increasing dose or additional diuretics in combination  - continue home Jardiance   - Beta blocker/aldactone held given soft pressures, resume when appropriate  - Cardiac diet with Fluid restriction at 1.5L with strict I/Os and daily STANDING weights  - Check Electrolytes, keep Mag >2 & K+ >4  - SCDs, Ambulate as tolerated    - Strict I&Os, Daily standing weights  - Review Low-salt diet and enforce medication adherence on discharge

## 2023-12-31 NOTE — ASSESSMENT & PLAN NOTE
Creatinine 2.1 on admit, baseline around 1.4. Likely prerenal etiology given urine sodium and FENa.  Recent Labs     12/30/23  0635 12/30/23  1415 12/31/23  0443   BUN 59* 64* 69*   CREATININE 2.4* 2.3* 2.4*         Estimated Creatinine Clearance: 40.4 mL/min (A) (based on SCr of 2.4 mg/dL (H)).    - Renal US: renal cysts, no hydronephrosis  - Urine Na: 14, Pr/Cr: 0.72; FENa 0.2%  - Monitor urine output and serial BMP and adjust therapy as needed.   - Strict I&Os and daily weights   - Avoid nephrotoxic agents such as NSAIDs, gadolinium and IV radiocontrast.  - Renally dose meds to current GFR.  - Maintain MAP > 65.

## 2023-12-31 NOTE — SUBJECTIVE & OBJECTIVE
Past Medical History:   Diagnosis Date    RIGOBERTO (acute kidney injury) 10/7/2020    Anticoagulant long-term use     Aspirin    CHF (congestive heart failure)     Hyperlipidemia     Hypertension     NICM (nonischemic cardiomyopathy) 6/16/2020    Obesity     AMELIA (obstructive sleep apnea) 1/7/2022    Peripheral vascular disease, unspecified 2/1/2021       Past Surgical History:   Procedure Laterality Date    INSERTION OF BIVENTRICULAR IMPLANTABLE CARDIOVERTER-DEFIBRILLATOR (ICD) N/A 02/13/2019    Procedure: INSERTION, ICD, BIVENTRICULAR;  Surgeon: Shailesh Eng MD;  Location: [facility] CATH LAB;  Service: Cardiology;  Laterality: N/A;  RN PRE OP 2-6-19  1ST CASE PER  [facility]. NOTIFIED [facility] THAT ANESTHESIA IS NOT PITO FOR 1ST CASE START-LO    INSERTION OF BIVENTRICULAR IMPLANTABLE CARDIOVERTER-DEFIBRILLATOR (ICD) N/A 09/28/2020    Procedure: INSERTION, ICD, BIVENTRICULAR;  Surgeon: Jim Kwong MD;  Location: [facility] EP LAB;  Service: Cardiology;  Laterality: N/A;  NICM, CRT-D, SJM,, MAC, DM, 3 Prep*Wearing LifeVest*    oral extraction  11/2018    TESTICLE SURGERY         Review of patient's allergies indicates:   Allergen Reactions    Ramipril      Leg swelling and gynecomastia     Losartan Rash     Family History       Problem Relation (Age of Onset)    Epilepsy Mother          Tobacco Use    Smoking status: Former     Current packs/day: 0.00     Average packs/day: 0.5 packs/day for 40.0 years (20.0 ttl pk-yrs)     Types: Cigarettes, Cigars     Start date: 1974     Quit date: 2014     Years since quitting: 10.0     Passive exposure: Current    Smokeless tobacco: Never   Substance and Sexual Activity    Alcohol use: Yes     Comment: once a month beer or liquor    Drug use: No    Sexual activity: Not Currently     Birth control/protection: None     Comment: uses protection sometimes     Review of Systems   Constitutional:  Positive for appetite change and fatigue. Negative for activity change, chills and fever.    Respiratory:  Negative for cough, shortness of breath and stridor.    Cardiovascular:  Negative for chest pain.   Gastrointestinal:  Positive for nausea. Negative for abdominal distention, abdominal pain, blood in stool, constipation, diarrhea and vomiting.   Endocrine: Negative for cold intolerance, heat intolerance, polydipsia, polyphagia and polyuria.   Genitourinary:  Negative for dysuria and flank pain.   Neurological:  Negative for dizziness, syncope, light-headedness and headaches.   Psychiatric/Behavioral:  Negative for agitation, behavioral problems and confusion.      Objective:     Vital Signs (Most Recent):  Temp: 98.2 °F (36.8 °C) (12/31/23 0748)  Pulse: 84 (12/31/23 0948)  Resp: 20 (12/31/23 0748)  BP: 99/68 (12/31/23 0748)  SpO2: 95 % (12/31/23 0948) Vital Signs (24h Range):  Temp:  [97 °F (36.1 °C)-98.2 °F (36.8 °C)] 98.2 °F (36.8 °C)  Pulse:  [68-84] 84  Resp:  [18-20] 20  SpO2:  [93 %-99 %] 95 %  BP: ()/(60-73) 99/68     Weight: 108.7 kg (239 lb 10.2 oz) (12/31/23 0432)  Body mass index is 28.42 kg/m².      Intake/Output Summary (Last 24 hours) at 12/31/2023 1003  Last data filed at 12/31/2023 0458  Gross per 24 hour   Intake 478 ml   Output 1550 ml   Net -1072 ml       Lines/Drains/Airways       Peripheral Intravenous Line  Duration                  Peripheral IV - Single Lumen 12/29/23 1401 20 G Left;Posterior Wrist 1 day         Peripheral IV - Single Lumen 12/29/23 1603 20 G;1 in Posterior;Right Wrist 1 day                     Physical Exam  Constitutional:       Appearance: Normal appearance.   HENT:      Head: Normocephalic and atraumatic.   Cardiovascular:      Rate and Rhythm: Normal rate and regular rhythm.      Pulses: Normal pulses.      Heart sounds: Normal heart sounds.   Pulmonary:      Effort: Pulmonary effort is normal. No respiratory distress.      Breath sounds: Normal breath sounds. No stridor. No wheezing or rhonchi.   Abdominal:      General: Abdomen is flat. Bowel  sounds are normal. There is no distension.      Palpations: Abdomen is soft.      Tenderness: There is no abdominal tenderness. There is no guarding or rebound.   Musculoskeletal:         General: Normal range of motion.      Cervical back: Normal range of motion and neck supple.   Skin:     General: Skin is warm.   Neurological:      General: No focal deficit present.      Mental Status: He is alert and oriented to person, place, and time.   Psychiatric:         Mood and Affect: Mood normal.         Behavior: Behavior normal.          Significant Labs:  CBC:   Recent Labs   Lab 12/29/23  1604 12/30/23  0635 12/31/23  0443   WBC 6.10 11.25 14.95*   HGB 13.8* 13.8* 13.5*   HCT 43.0 42.3 40.1   * 123* 135*     CMP:   Recent Labs   Lab 12/31/23  0443   *   CALCIUM 9.8   ALBUMIN 2.7*   PROT 6.6   *   K 4.0   CO2 24   CL 98   BUN 69*   CREATININE 2.4*   ALKPHOS 93   ALT 37   AST 53*   BILITOT 5.8*       Significant Imaging:  Imaging results within the past 24 hours have been reviewed.

## 2023-12-31 NOTE — PROGRESS NOTES
[facility] - Cardiology [facility] Medicine  Progress Note    Patient Name: Papito Bhakta  MRN: [id number]  Patient Class: OP- Observation   Admission Date: 12/29/2023  Length of Stay: 0 days  Attending Physician: Milly Mendoza MD  Primary Care Provider: Brynn To MD        Subjective:     Principal Problem:Acute on chronic combined systolic and diastolic CHF (congestive heart failure)        HPI:  Papito Bhakta is a 68 y.o. male with NICM, combined CHF(11/2023 EF 10 -15%, G3DD) s/p ICD placement, CKD, HLD, HTN, and AMELIA who presents for bilateral lower extremity swelling and shortness of breath. Patient reports he has been having worsening shortness of breath for the past 6 months. He had acutely worsening SOB today where he described becoming profoundly dyspneic on exertion. He reported taking 2 of his 80 mg Lasix this morning with subsequent minimal urine output and minimal improvement in his SOB. He reports SOB aggravated with lying down and he requires frequent positional changes to keep comfortable. He reports additional poor appetite and urine output for the past week although he reports he has been drinking well. He had 2 episodes of nausea/vomiting this past week as well. He denies fever/chills, chest pain, abdominal pain, recent illness, medication changes. Patient reports adherence with all medications. He reports he lives with his daughter who helps him with his medications and healthcare.    Additionally he reports chronic leg pain from a chronic RLE wound. He went to wound care yesterday where dressings were changed and he was told they were healing well. He has bilateral lower extremity edema R>L that is typical for him and he denies recent worsening.    Overview/Hospital Course:  Pt admitted to hospital medicine for acute heart failure exacerbation. Continuing diuresis. Also complaining of inability to swallow for 3 months duration. SLP evaluated the patient and determined that he  could tolerate liquids. GI has been consulted, pending recommendations.     Interval History: NAEO. Afebrile and HDS on RA. Still having L leg pain with touch. Vomits up solid food.     Review of Systems   Gastrointestinal:  Positive for vomiting. Negative for nausea.     Objective:     Vital Signs (Most Recent):  Temp: 97.6 °F (36.4 °C) (12/31/23 1141)  Pulse: 75 (12/31/23 1141)  Resp: 20 (12/31/23 1141)  BP: 90/60 (12/31/23 1141)  SpO2: 97 % (12/31/23 1141) Vital Signs (24h Range):  Temp:  [97 °F (36.1 °C)-98.2 °F (36.8 °C)] 97.6 °F (36.4 °C)  Pulse:  [68-84] 75  Resp:  [18-20] 20  SpO2:  [93 %-99 %] 97 %  BP: ()/(60-73) 90/60     Weight: 108.7 kg (239 lb 10.2 oz)  Body mass index is 28.42 kg/m².    Intake/Output Summary (Last 24 hours) at 12/31/2023 1207  Last data filed at 12/31/2023 1009  Gross per 24 hour   Intake 718 ml   Output 1675 ml   Net -957 ml         Physical Exam  Vitals and nursing note reviewed.   Constitutional:       Appearance: He is ill-appearing.   HENT:      Head: Normocephalic and atraumatic.      Mouth/Throat:      Mouth: Mucous membranes are moist.   Eyes:      General: Scleral icterus present.      Extraocular Movements: Extraocular movements intact.      Comments: Anisocoria   Cardiovascular:      Rate and Rhythm: Normal rate and regular rhythm.      Heart sounds:      Gallop present.   Pulmonary:      Effort: Pulmonary effort is normal. No respiratory distress.      Breath sounds: Normal breath sounds. No wheezing.   Abdominal:      General: Abdomen is flat. There is no distension.      Palpations: Abdomen is soft.      Tenderness: There is no abdominal tenderness.   Musculoskeletal:      Left lower leg: Tenderness present. Edema present.      Comments: Wound dressings on RLE   Skin:     General: Skin is warm and dry.   Neurological:      Mental Status: He is alert and oriented to person, place, and time.   Psychiatric:         Mood and Affect: Mood normal.         Behavior:  Behavior normal.             Significant Labs: All pertinent labs within the past 24 hours have been reviewed.    Significant Imaging: I have reviewed all pertinent imaging results/findings within the past 24 hours.    Assessment/Plan:      * Acute on chronic combined systolic and diastolic CHF (congestive heart failure)  Patient is identified as having Combined Systolic and Diastolic heart failure that is Acute on chronic. CHF is currently uncontrolled due to Pulmonary edema/pleural effusion on CXR. Latest ECHO performed and demonstrates- Results for orders placed during the hospital encounter of 11/10/23    Echo    Interpretation Summary    Left Ventricle: The left ventricle is severely dilated. Mildly increased wall thickness. There is mild concentric hypertrophy. Severe global hypokinesis present. There is severely reduced systolic function with a visually estimated ejection fraction of 10 -15%. Grade III diastolic dysfunction.    Right Ventricle: Severe right ventricular enlargement. Systolic function is severely reduced.    Mitral Valve: There is no stenosis. There is mild to moderate regurgitation with a centrally directed jet.    Tricuspid Valve: There is mild to moderate regurgitation.    Pulmonary Artery: The estimated pulmonary artery systolic pressure is 67 mmHg.  .Last BNP reviewed- and noted below   Recent Labs   Lab 12/29/23  1604   BNP 3,067*         Presented for acute on chronic SOB with associated low UOP. Afebrile HDS. BNP 3,067, Troponin 0.031. CXR with cardiomegaly, vascular congestion, and edema. Received 100 of Lasix in the ED.    - RIP pending  - IV lasix 80 BID. If urine output suboptimal consider increasing dose or additional diuretics in combination  - continue home Jardiance   - Beta blocker/aldactone held given soft pressures, resume when appropriate  - Cardiac diet with Fluid restriction at 1.5L with strict I/Os and daily STANDING weights  - Check Electrolytes, keep Mag >2 & K+ >4  -  SCDs, Ambulate as tolerated    - Strict I&Os, Daily standing weights  - Review Low-salt diet and enforce medication adherence on discharge    Microcytic anemia  Iron studies notable for Fe 26 and sat iron 8%. TIBC, ferritin, transferrin wnl. Likely iron deficiency anemia.    - Starting daily iron tablet  - Daily CBCs    Dysphagia  Reports a 3 month history of inability to tolerate solid foods. SLP has assessed him, can tolerate liquids.     - GI consulted, appreciate recommendations  - Adding boost to meals      Pupil asymmetry  Notable asymetry on pupils by patient. Chart review shows left orbital trauma in 2021 with notes concerned for dilation and vision changes. When talking to daughter, this is chronic. States no vision changes or neuro symptoms whatsoever.     Serum total bilirubin elevated  Direct bili 4.5. No signs of hemolysis.    - Daily CMPs      Nausea and vomiting  Complaints of several day history of N/V on admission. Also noted 3 month history of difficulty swallowing solid foods.    - Consulted SLP, able to tolerate liquids  - Consulted GI, appreciate recommendations      Acute renal failure superimposed on stage 3a chronic kidney disease  Creatinine 2.1 on admit, baseline around 1.4. Likely prerenal etiology given urine sodium and FENa.  Recent Labs     12/30/23  0635 12/30/23  1415 12/31/23  0443   BUN 59* 64* 69*   CREATININE 2.4* 2.3* 2.4*         Estimated Creatinine Clearance: 40.4 mL/min (A) (based on SCr of 2.4 mg/dL (H)).    - Renal US: renal cysts, no hydronephrosis  - Urine Na: 14, Pr/Cr: 0.72; FENa 0.2%  - Monitor urine output and serial BMP and adjust therapy as needed.   - Strict I&Os and daily weights   - Avoid nephrotoxic agents such as NSAIDs, gadolinium and IV radiocontrast.  - Renally dose meds to current GFR.  - Maintain MAP > 65.    AMELIA (obstructive sleep apnea)  CPAP QHS      Biventricular ICD (implantable cardioverter-defibrillator) in place  Stable no acute issues, he denies  discharge. QTc chronically prolonged. Continue home amiodarone for NSVT prevention. Monitor on telemetry       Hyperlipidemia  Stable. Continue Home Pravastatin.        Essential hypertension  Chronic, controlled. Latest blood pressure and vitals reviewed-     Temp:  [97 °F (36.1 °C)-98.2 °F (36.8 °C)]   Pulse:  [68-84]   Resp:  [18-20]   BP: ()/(60-73)   SpO2:  [93 %-99 %] .   Home meds for hypertension were reviewed and noted below.   Hypertension Medications               furosemide (LASIX) 80 MG tablet TAKE 1 TABLET EVERY DAY    metoprolol succinate (TOPROL-XL) 50 MG 24 hr tablet TAKE 1 TABLET EVERY DAY    spironolactone (ALDACTONE) 25 MG tablet TAKE 1/2 TABLET (12.5 MG TOTAL) ONCE DAILY. HOLD IF SYSTOLIC BLOOD PRESSURE IS LESS THAN 110            While in the hospital, will manage blood pressure as follows; Adjust home antihypertensive regimen as follows- Holding in setting of borderline pressures in setting of new diuresis     Will utilize p.r.n. blood pressure medication only if patient's blood pressure greater than 180/110 and he develops symptoms such as worsening chest pain or shortness of breath.      VTE Risk Mitigation (From admission, onward)           Ordered     heparin (porcine) injection 5,000 Units  Every 8 hours         12/29/23 1759     IP VTE HIGH RISK PATIENT  Once         12/29/23 1759     Place sequential compression device  Until discontinued         12/29/23 1759                    Discharge Planning   MARIIA: 1/3/2024     Code Status: Full Code   Is the patient medically ready for discharge?: No    Reason for patient still in hospital (select all that apply): Treatment                     Deepa Holly MD  Department of Hospital Medicine   [facility] - Cardiology Stepdown

## 2023-12-31 NOTE — CONSULTS
[facility] - Cardiology Stepdown  Gastroenterology  Consult Note    Patient Name: Papito Bhakta  MRN: [id number]  Admission Date: 12/29/2023  Hospital Length of Stay: 0 days  Code Status: Full Code   Attending Provider: Milly Mendoza MD   Consulting Provider: Antonio Madrid MD  Primary Care Physician: Brynn To MD  Principal Problem:Acute on chronic combined systolic and diastolic CHF (congestive heart failure)    Inpatient consult to Gastroenterology  Consult performed by: Antonio Madrid MD  Consult ordered by: Deepa Holly MD        Subjective:     HPI:  68 years old male patient with ongoing medical history of HFrEF s/p ICD placed is being consulted to gastroenterology for dysphagia from 3 months.    The patient is being seen and examined on the bedside , alert and oriented denied any nausea, vomiting, diarrhea and abdominal pain. The patient stated dysphagia initially with solids from last 3 months, now started having dysphagia with liquids associated with nausea and vomiting. The patient also stated dysphagia associated with weight loss. The patient has denied any NSAIDs use, bisphosphonate's, iron tablets and recent tetracycline antibiotic and anti-coagulants. The patient has denied any recent EGD, also being seen by the SLP for evaluation, mentioned no aspiration with liquids.   Past medical history is significant for CKD, HLD, HTN and AMELIA, currently on amiodarone, aspirin, jardiance, lasix and statin.   Colonoscopy 05/03/2005: Polyp in distal ascending colon and transverse colon size 25-30 cm.   The patient never had any repeat colonoscopy and also never had EGD. The patient has also denied any treatment for iron deficiency anemia.   During this hospitalization he is being treated for CHF exacerbation  Hbg trend 13.5, 13.8 BUN 69, 64. Bilirubin 5.8, AST 53, ALT 37.     Past Medical History:   Diagnosis Date    RIGOBERTO (acute kidney injury) 10/7/2020    Anticoagulant long-term use     Aspirin    CHF  (congestive heart failure)     Hyperlipidemia     Hypertension     NICM (nonischemic cardiomyopathy) 6/16/2020    Obesity     AMELIA (obstructive sleep apnea) 1/7/2022    Peripheral vascular disease, unspecified 2/1/2021       Past Surgical History:   Procedure Laterality Date    INSERTION OF BIVENTRICULAR IMPLANTABLE CARDIOVERTER-DEFIBRILLATOR (ICD) N/A 02/13/2019    Procedure: INSERTION, ICD, BIVENTRICULAR;  Surgeon: Shailesh Eng MD;  Location: [facility] CATH LAB;  Service: Cardiology;  Laterality: N/A;  RN PRE OP 2-6-19  1ST CASE PER  RAD[facility]. NOTIFIED [facility] THAT ANESTHESIA IS NOT PITO FOR 1ST CASE START-LO    INSERTION OF BIVENTRICULAR IMPLANTABLE CARDIOVERTER-DEFIBRILLATOR (ICD) N/A 09/28/2020    Procedure: INSERTION, ICD, BIVENTRICULAR;  Surgeon: Jim Kwong MD;  Location: [facility] EP LAB;  Service: Cardiology;  Laterality: N/A;  NICM, CRT-D, SJM,, MAC, DM, 3 Prep*Wearing LifeVest*    oral extraction  11/2018    TESTICLE SURGERY         Review of patient's allergies indicates:   Allergen Reactions    Ramipril      Leg swelling and gynecomastia     Losartan Rash     Family History       Problem Relation (Age of Onset)    Epilepsy Mother          Tobacco Use    Smoking status: Former     Current packs/day: 0.00     Average packs/day: 0.5 packs/day for 40.0 years (20.0 ttl pk-yrs)     Types: Cigarettes, Cigars     Start date: 1974     Quit date: 2014     Years since quitting: 10.0     Passive exposure: Current    Smokeless tobacco: Never   Substance and Sexual Activity    Alcohol use: Yes     Comment: once a month beer or liquor    Drug use: No    Sexual activity: Not Currently     Birth control/protection: None     Comment: uses protection sometimes     Review of Systems   Constitutional:  Positive for appetite change and fatigue. Negative for activity change, chills and fever.   Respiratory:  Negative for cough, shortness of breath and stridor.    Cardiovascular:  Negative for chest pain.   Gastrointestinal:   Positive for nausea. Negative for abdominal distention, abdominal pain, blood in stool, constipation, diarrhea and vomiting.   Endocrine: Negative for cold intolerance, heat intolerance, polydipsia, polyphagia and polyuria.   Genitourinary:  Negative for dysuria and flank pain.   Neurological:  Negative for dizziness, syncope, light-headedness and headaches.   Psychiatric/Behavioral:  Negative for agitation, behavioral problems and confusion.      Objective:     Vital Signs (Most Recent):  Temp: 98.2 °F (36.8 °C) (12/31/23 0748)  Pulse: 84 (12/31/23 0948)  Resp: 20 (12/31/23 0748)  BP: 99/68 (12/31/23 0748)  SpO2: 95 % (12/31/23 0948) Vital Signs (24h Range):  Temp:  [97 °F (36.1 °C)-98.2 °F (36.8 °C)] 98.2 °F (36.8 °C)  Pulse:  [68-84] 84  Resp:  [18-20] 20  SpO2:  [93 %-99 %] 95 %  BP: ()/(60-73) 99/68     Weight: 108.7 kg (239 lb 10.2 oz) (12/31/23 0432)  Body mass index is 28.42 kg/m².      Intake/Output Summary (Last 24 hours) at 12/31/2023 1003  Last data filed at 12/31/2023 0458  Gross per 24 hour   Intake 478 ml   Output 1550 ml   Net -1072 ml       Lines/Drains/Airways       Peripheral Intravenous Line  Duration                  Peripheral IV - Single Lumen 12/29/23 1401 20 G Left;Posterior Wrist 1 day         Peripheral IV - Single Lumen 12/29/23 1603 20 G;1 in Posterior;Right Wrist 1 day                     Physical Exam  Constitutional:       Appearance: Normal appearance.   HENT:      Head: Normocephalic and atraumatic.   Cardiovascular:      Rate and Rhythm: Normal rate and regular rhythm.      Pulses: Normal pulses.      Heart sounds: Normal heart sounds.   Pulmonary:      Effort: Pulmonary effort is normal. No respiratory distress.      Breath sounds: Normal breath sounds. No stridor. No wheezing or rhonchi.   Abdominal:      General: Abdomen is flat. Bowel sounds are normal. There is no distension.      Palpations: Abdomen is soft.      Tenderness: There is no abdominal tenderness. There is no  guarding or rebound.   Musculoskeletal:         General: Normal range of motion.      Cervical back: Normal range of motion and neck supple.   Skin:     General: Skin is warm.   Neurological:      General: No focal deficit present.      Mental Status: He is alert and oriented to person, place, and time.   Psychiatric:         Mood and Affect: Mood normal.         Behavior: Behavior normal.          Significant Labs:  CBC:   Recent Labs   Lab 12/29/23  1604 12/30/23  0635 12/31/23  0443   WBC 6.10 11.25 14.95*   HGB 13.8* 13.8* 13.5*   HCT 43.0 42.3 40.1   * 123* 135*     CMP:   Recent Labs   Lab 12/31/23  0443   *   CALCIUM 9.8   ALBUMIN 2.7*   PROT 6.6   *   K 4.0   CO2 24   CL 98   BUN 69*   CREATININE 2.4*   ALKPHOS 93   ALT 37   AST 53*   BILITOT 5.8*       Significant Imaging:  Imaging results within the past 24 hours have been reviewed.  Assessment/Plan:     GI  Dysphagia  The patient is being consulted to gastroenterology for dysphagia to solids from last 3 months and now started having dysphagia with liquids.  Dysphagia is associated with nausea, vomiting and weight loss  Denied any history of asthma, and recent medication addition.  Being evaluated by SLP and tolerated liquids.  Never had any EGD, and last colonoscopy was in 2005 which showed polyps.  Denied any cough, choking with drinking and also denied an neck pain  During this hospitalization he is being treated for CHF exacerbation  Hbg trend 13.5, 13.8 BUN 69, 64. Bilirubin 5.8, AST 53, ALT 37.     Recommendations  Continue clear liquid diet  Inpatient EGD before discharge probably on Wednesday, NPO after midnight day before the procedure.  and he will get outpatient colonoscopy.   Hold anticoagulation unless indicated.       Thank you for your consult. I will follow-up with patient. Please contact us if you have any additional questions.    Antonio Madrid MD  Gastroenterology  [facility] - Cardiology Stepdown

## 2023-12-31 NOTE — ASSESSMENT & PLAN NOTE
Complaints of several day history of N/V on admission. Also noted 3 month history of difficulty swallowing solid foods.    - Consulted SLP, able to tolerate liquids  - Consulted GI, appreciate recommendations

## 2023-12-31 NOTE — ASSESSMENT & PLAN NOTE
Reports a 3 month history of inability to tolerate solid foods. SLP has assessed him, can tolerate liquids.     - GI consulted, appreciate recommendations  - Adding boost to meals

## 2023-12-31 NOTE — ASSESSMENT & PLAN NOTE
The patient is being consulted to gastroenterology for dysphagia to solids from last 3 months and now started having dysphagia with liquids.  Dysphagia is associated with nausea, vomiting and weight loss  Denied any history of asthma, and recent medication addition.  Being evaluated by SLP and tolerated liquids.  Never had any EGD, and last colonoscopy was in 2005 which showed polyps.  Denied any cough, choking with drinking and also denied an neck pain  During this hospitalization he is being treated for CHF exacerbation  Hbg trend 13.5, 13.8 BUN 69, 64. Bilirubin 5.8, AST 53, ALT 37.     Recommendations

## 2023-12-31 NOTE — PLAN OF CARE
BG monitored. VSS. Pt educated on fall risk and remained free from falls/trauma/injury. Denies chest pain, SOB, palpitations, dizziness, pain, or discomfort. Plan of care reviewed with pt, all questions answered. Bed locked in lowest position, call bell within reach, no acute distress noted, will continue to monitor.

## 2023-12-31 NOTE — NURSING
ISO has been  placed  up  for covid, patient and  daughter who was  at bedside have been updated on this information, will cont to la nena.

## 2024-01-01 PROBLEM — N18.9 CKD (CHRONIC KIDNEY DISEASE): Status: ACTIVE | Noted: 2024-01-01

## 2024-01-01 PROBLEM — U07.1 COVID-19: Status: ACTIVE | Noted: 2024-01-01

## 2024-01-01 LAB
ALBUMIN SERPL BCP-MCNC: 2.6 G/DL (ref 3.5–5.2)
ALP SERPL-CCNC: 94 U/L (ref 55–135)
ALT SERPL W/O P-5'-P-CCNC: 40 U/L (ref 10–44)
ANION GAP SERPL CALC-SCNC: 16 MMOL/L (ref 8–16)
AST SERPL-CCNC: 53 U/L (ref 10–40)
BILIRUB SERPL-MCNC: 5.2 MG/DL (ref 0.1–1)
BNP SERPL-MCNC: 2684 PG/ML (ref 0–99)
BUN SERPL-MCNC: 66 MG/DL (ref 8–23)
CALCIUM SERPL-MCNC: 9.6 MG/DL (ref 8.7–10.5)
CHLORIDE SERPL-SCNC: 98 MMOL/L (ref 95–110)
CO2 SERPL-SCNC: 24 MMOL/L (ref 23–29)
CREAT SERPL-MCNC: 2.1 MG/DL (ref 0.5–1.4)
ERYTHROCYTE [DISTWIDTH] IN BLOOD BY AUTOMATED COUNT: 21.8 % (ref 11.5–14.5)
EST. GFR  (NO RACE VARIABLE): 33.7 ML/MIN/1.73 M^2
GLUCOSE SERPL-MCNC: 124 MG/DL (ref 70–110)
HCT VFR BLD AUTO: 39.5 % (ref 40–54)
HGB BLD-MCNC: 13.4 G/DL (ref 14–18)
LACTATE SERPL-SCNC: 2.3 MMOL/L (ref 0.5–2.2)
LACTATE SERPL-SCNC: 3 MMOL/L (ref 0.5–2.2)
MAGNESIUM SERPL-MCNC: 2.2 MG/DL (ref 1.6–2.6)
MCH RBC QN AUTO: 23.5 PG (ref 27–31)
MCHC RBC AUTO-ENTMCNC: 33.9 G/DL (ref 32–36)
MCV RBC AUTO: 69 FL (ref 82–98)
OHS CV AF BURDEN PERCENT: < 1
OHS CV BIV PACING PERCENT: 91 %
OHS CV DC REMOTE DEVICE TYPE: NORMAL
PHOSPHATE SERPL-MCNC: 3.5 MG/DL (ref 2.7–4.5)
PLATELET # BLD AUTO: 117 K/UL (ref 150–450)
PMV BLD AUTO: ABNORMAL FL (ref 9.2–12.9)
POTASSIUM SERPL-SCNC: 3.9 MMOL/L (ref 3.5–5.1)
PROT SERPL-MCNC: 6.8 G/DL (ref 6–8.4)
RBC # BLD AUTO: 5.7 M/UL (ref 4.6–6.2)
SODIUM SERPL-SCNC: 138 MMOL/L (ref 136–145)
TROPONIN I SERPL DL<=0.01 NG/ML-MCNC: 0.04 NG/ML (ref 0–0.03)
TROPONIN I SERPL DL<=0.01 NG/ML-MCNC: 0.05 NG/ML (ref 0–0.03)
TROPONIN I SERPL DL<=0.01 NG/ML-MCNC: 0.06 NG/ML (ref 0–0.03)
TROPONIN I SERPL DL<=0.01 NG/ML-MCNC: 0.06 NG/ML (ref 0–0.03)
WBC # BLD AUTO: 11.02 K/UL (ref 3.9–12.7)

## 2024-01-01 PROCEDURE — 83880 ASSAY OF NATRIURETIC PEPTIDE: CPT | Performed by: INTERNAL MEDICINE

## 2024-01-01 PROCEDURE — 80053 COMPREHEN METABOLIC PANEL: CPT | Performed by: STUDENT IN AN ORGANIZED HEALTH CARE EDUCATION/TRAINING PROGRAM

## 2024-01-01 PROCEDURE — 63600175 PHARM REV CODE 636 W HCPCS

## 2024-01-01 PROCEDURE — 84484 ASSAY OF TROPONIN QUANT: CPT | Performed by: INTERNAL MEDICINE

## 2024-01-01 PROCEDURE — 25000003 PHARM REV CODE 250

## 2024-01-01 PROCEDURE — 84484 ASSAY OF TROPONIN QUANT: CPT | Mod: 91

## 2024-01-01 PROCEDURE — 96372 THER/PROPH/DIAG INJ SC/IM: CPT

## 2024-01-01 PROCEDURE — 63600175 PHARM REV CODE 636 W HCPCS: Mod: JZ,TB | Performed by: STUDENT IN AN ORGANIZED HEALTH CARE EDUCATION/TRAINING PROGRAM

## 2024-01-01 PROCEDURE — 25000003 PHARM REV CODE 250: Performed by: STUDENT IN AN ORGANIZED HEALTH CARE EDUCATION/TRAINING PROGRAM

## 2024-01-01 PROCEDURE — 83735 ASSAY OF MAGNESIUM: CPT | Performed by: STUDENT IN AN ORGANIZED HEALTH CARE EDUCATION/TRAINING PROGRAM

## 2024-01-01 PROCEDURE — G0378 HOSPITAL OBSERVATION PER HR: HCPCS

## 2024-01-01 PROCEDURE — 99213 OFFICE O/P EST LOW 20 MIN: CPT | Mod: ,,, | Performed by: INTERNAL MEDICINE

## 2024-01-01 PROCEDURE — 84100 ASSAY OF PHOSPHORUS: CPT | Performed by: STUDENT IN AN ORGANIZED HEALTH CARE EDUCATION/TRAINING PROGRAM

## 2024-01-01 PROCEDURE — 83605 ASSAY OF LACTIC ACID: CPT | Mod: 91

## 2024-01-01 PROCEDURE — 83605 ASSAY OF LACTIC ACID: CPT | Performed by: INTERNAL MEDICINE

## 2024-01-01 PROCEDURE — 85027 COMPLETE CBC AUTOMATED: CPT | Performed by: STUDENT IN AN ORGANIZED HEALTH CARE EDUCATION/TRAINING PROGRAM

## 2024-01-01 PROCEDURE — 36415 COLL VENOUS BLD VENIPUNCTURE: CPT | Performed by: INTERNAL MEDICINE

## 2024-01-01 PROCEDURE — 25000242 PHARM REV CODE 250 ALT 637 W/ HCPCS

## 2024-01-01 PROCEDURE — 36415 COLL VENOUS BLD VENIPUNCTURE: CPT | Mod: XB

## 2024-01-01 PROCEDURE — 27000221 HC OXYGEN, UP TO 24 HOURS

## 2024-01-01 RX ADMIN — AMIODARONE HYDROCHLORIDE 0.5 MG/MIN: 1.8 INJECTION, SOLUTION INTRAVENOUS at 01:01

## 2024-01-01 RX ADMIN — FUROSEMIDE 80 MG: 10 INJECTION, SOLUTION INTRAVENOUS at 09:01

## 2024-01-01 RX ADMIN — HEPARIN SODIUM 5000 UNITS: 5000 INJECTION INTRAVENOUS; SUBCUTANEOUS at 05:01

## 2024-01-01 RX ADMIN — HEPARIN SODIUM 5000 UNITS: 5000 INJECTION INTRAVENOUS; SUBCUTANEOUS at 01:01

## 2024-01-01 RX ADMIN — REMDESIVIR 100 MG: 100 INJECTION, POWDER, LYOPHILIZED, FOR SOLUTION INTRAVENOUS at 05:01

## 2024-01-01 RX ADMIN — ASPIRIN 325 MG: 325 TABLET, COATED ORAL at 09:01

## 2024-01-01 RX ADMIN — EMPAGLIFLOZIN 10 MG: 10 TABLET, FILM COATED ORAL at 09:01

## 2024-01-01 RX ADMIN — AMIODARONE HYDROCHLORIDE 0.5 MG/MIN: 1.8 INJECTION, SOLUTION INTRAVENOUS at 12:01

## 2024-01-01 RX ADMIN — PRAVASTATIN SODIUM 80 MG: 40 TABLET ORAL at 09:01

## 2024-01-01 RX ADMIN — HEPARIN SODIUM 5000 UNITS: 5000 INJECTION INTRAVENOUS; SUBCUTANEOUS at 09:01

## 2024-01-01 RX ADMIN — FERROUS SULFATE TAB 325 MG (65 MG ELEMENTAL FE) 1 EACH: 325 (65 FE) TAB at 09:01

## 2024-01-01 NOTE — ASSESSMENT & PLAN NOTE
COVID positive, noted 12/31  - Last remdesivir treatment today  - Hold steroids at this time, hesitant to exacerbate fluid retention

## 2024-01-01 NOTE — ASSESSMENT & PLAN NOTE
Chronic, controlled. Latest blood pressure and vitals reviewed-     Temp:  [97.5 °F (36.4 °C)-97.6 °F (36.4 °C)]   Pulse:  []   Resp:  [16-22]   BP: ()/(42-84)   SpO2:  [93 %-100 %] .   Home meds for hypertension were reviewed and noted below.   Hypertension Medications               furosemide (LASIX) 80 MG tablet TAKE 1 TABLET EVERY DAY    metoprolol succinate (TOPROL-XL) 50 MG 24 hr tablet TAKE 1 TABLET EVERY DAY    spironolactone (ALDACTONE) 25 MG tablet TAKE 1/2 TABLET (12.5 MG TOTAL) ONCE DAILY. HOLD IF SYSTOLIC BLOOD PRESSURE IS LESS THAN 110            While in the hospital, will manage blood pressure as follows; Adjust home antihypertensive regimen as follows- Holding in setting of borderline pressures in setting of new diuresis     Will utilize p.r.n. blood pressure medication only if patient's blood pressure greater than 180/110 and he develops symptoms such as worsening chest pain or shortness of breath.

## 2024-01-01 NOTE — CARE UPDATE
RAPID RESPONSE NURSE PROACTIVE ROUNDING NOTE       Time of Visit: 0745    Admit Date: 2023  LOS: 0  Code Status: Full Code   Date of Visit: 2023  : 1955  Age: 68 y.o.  Sex: male  Race: Black or   Bed: 331/331 A:   MRN: [id number]  Was the patient discharged from an ICU this admission? No   Was the patient discharged from a PACU within last 24 hours? No   Did the patient receive conscious sedation/general anesthesia in last 24 hours? No  Was the patient in the ED within the past 24 hours? No  Was the patient on NIPPV within the past 24 hours? No   Attending Physician: Milly Mendoza MD  Primary Service: [facility] MED 2   Time spent at the bedside: 15 -30 min    SITUATION    Notified by bedside RN via phone call.  Reason for alert: -160s, pt c/o of not feeling well and shortness of breath.   Called to evaluate the patient for Dysrythmia    BACKGROUND     Why is the patient in the hospital?: Acute on chronic combined systolic and diastolic CHF (congestive heart failure)    Patient has a past medical history of RIGOBERTO (acute kidney injury), Anticoagulant long-term use, CHF (congestive heart failure), Hyperlipidemia, Hypertension, Microcytic anemia, NICM (nonischemic cardiomyopathy), Obesity, AMELIA (obstructive sleep apnea), and Peripheral vascular disease, unspecified.    Last Vitals:  Temp: 97.5 °F (36.4 °C) ( 1511)  Pulse: 75 (2000)  Resp: 20 ( 1933)  BP: 109/84 (2000)  SpO2: 99 % (2000)    24 Hours Vitals Range:  Temp:  [97 °F (36.1 °C)-98.2 °F (36.8 °C)]   Pulse:  []   Resp:  [16-20]   BP: ()/(42-84)   SpO2:  [93 %-100 %]     Labs:  Recent Labs     23  1604 23  0635 23  0443   WBC 6.10 11.25 14.95*   HGB 13.8* 13.8* 13.5*   HCT 43.0 42.3 40.1   * 123* 135*       Recent Labs     23  0635 23  1415 23  0443 23  1804   * 135* 133* 138   K 5.0 4.5 4.0 3.8   CL 95 96 98 100   CO2 16* 17* 24 24    BUN 59* 64* 69* 61*   CREATININE 2.4* 2.3* 2.4* 2.0*   GLU 42* 89 122* 119*   PHOS 5.4*  --  3.5  --    MG 2.1  --  2.2 2.1        Recent Labs     12/30/23  1522   PH 7.379   PCO2 39.9   PO2 23*   HCO3 23.5*   POCSATURATED 40   BE -2        ASSESSMENT    Physical Exam  HENT:      Mouth/Throat:      Mouth: Mucous membranes are moist.   Cardiovascular:      Rate and Rhythm: Tachycardia present. Rhythm irregular.   Pulmonary:      Effort: Pulmonary effort is normal.      Breath sounds: Normal breath sounds.   Skin:     General: Skin is warm and dry.   Neurological:      Mental Status: He is alert and oriented to person, place, and time.      GCS: GCS eye subscore is 4. GCS verbal subscore is 5. GCS motor subscore is 6.   Psychiatric:         Mood and Affect: Mood normal.         Speech: Speech normal.         Behavior: Behavior is cooperative.         Thought Content: Thought content normal.         INTERVENTIONS    The patient was seen for Cardiac problem. Staff concerns included tachycardia. The following interventions were performed: EKG and continuous cardiac monitoring.    RECOMMENDATIONS    Continue to monitor pt for changes in HR. Ask MD to reorder Metoprolol home dose    PROVIDER ESCALATION    Yes/No  No    Orders received and case discussed with NA.    Disposition: Remain in room 331.    FOLLOW-UP    Charge Shira AGUILAR  updated on plan of care. Instructed to call the Rapid Response Nurse, Stefanie Paez RN at [phone] for additional questions or concerns.

## 2024-01-01 NOTE — ASSESSMENT & PLAN NOTE
Patient is identified as having Combined Systolic and Diastolic heart failure that is Acute on chronic. CHF is currently uncontrolled due to Pulmonary edema/pleural effusion on CXR. Latest ECHO performed and demonstrates- Results for orders placed during the hospital encounter of 11/10/23    Echo    Interpretation Summary    Left Ventricle: The left ventricle is severely dilated. Mildly increased wall thickness. There is mild concentric hypertrophy. Severe global hypokinesis present. There is severely reduced systolic function with a visually estimated ejection fraction of 10 -15%. Grade III diastolic dysfunction.    Right Ventricle: Severe right ventricular enlargement. Systolic function is severely reduced.    Mitral Valve: There is no stenosis. There is mild to moderate regurgitation with a centrally directed jet.    Tricuspid Valve: There is mild to moderate regurgitation.    Pulmonary Artery: The estimated pulmonary artery systolic pressure is 67 mmHg.  .Last BNP reviewed- and noted below   Recent Labs   Lab 01/01/24  0551   BNP 2,684*         Presented for acute on chronic SOB with associated low UOP. Afebrile HDS. BNP 3,067, Troponin 0.031. CXR with cardiomegaly, vascular congestion, and edema. Received 100 of Lasix in the ED.    - RIP pending  - IV lasix 80 BID. If urine output suboptimal consider increasing dose or additional diuretics in combination  - continue home Jardiance   - Beta blocker/aldactone held given soft pressures, resume when appropriate  - Cardiac diet with Fluid restriction at 1.5L with strict I/Os and daily STANDING weights  - Check Electrolytes, keep Mag >2 & K+ >4  - SCDs, Ambulate as tolerated    - Strict I&Os, Daily standing weights  - Review Low-salt diet and enforce medication adherence on discharge

## 2024-01-01 NOTE — ASSESSMENT & PLAN NOTE
Stable on admission, no acute issues, he denies discharge. QTc chronically prolonged. Continue home amiodarone for NSVT prevention. Monitor on telemetry   -- 12/31, noted to have AF RVR with hypotension. Started on amio gtt. Per report, pacemaker malfunctioning. EP consulted for formal evaluation

## 2024-01-01 NOTE — RESPIRATORY THERAPY
RAPID RESPONSE RESPIRATORY THERAPY NOTE             Code Status: Full Code   : 1955  Bed: 331/331 A:   MRN: [id number]  Time page Received:   Time Rapid Response RT at Bedside:   Time Rapid Response RT left Bedside:     SITUATION    Evaluated patient for: Cardiac    BACKGROUND    Why is the patient in the hospital?: Acute on chronic combined systolic and diastolic CHF (congestive heart failure)    Patient has a past medical history of RIGOBERTO (acute kidney injury), Anticoagulant long-term use, CHF (congestive heart failure), Hyperlipidemia, Hypertension, Microcytic anemia, NICM (nonischemic cardiomyopathy), Obesity, AMELIA (obstructive sleep apnea), and Peripheral vascular disease, unspecified.    24 Hours Vitals Range:  Temp:  [97 °F (36.1 °C)-98.2 °F (36.8 °C)]   Pulse:  []   Resp:  [16-20]   BP: ()/(42-73)   SpO2:  [93 %-100 %]     Labs:    Recent Labs     23  0635 23  1415 23  0443 23  1804   * 135* 133* 138   K 5.0 4.5 4.0 3.8   CL 95 96 98 100   CO2 16* 17* 24 24   BUN 59* 64* 69* 61*   CREATININE 2.4* 2.3* 2.4* 2.0*   GLU 42* 89 122* 119*   PHOS 5.4*  --  3.5  --    MG 2.1  --  2.2 2.1        Recent Labs     23  1522   PH 7.379   PCO2 39.9   PO2 23*   HCO3 23.5*   POCSATURATED 40   BE -2       ASSESSMENT/INTERVENTIONS  Pt in bed with MD and [place] nursing attending.    Last Vitals: Temp: 97.5 °F (36.4 °C) (1511)  Pulse: 115 (1818)  Resp: 18 (1809)  BP: 90/62 (1818)  SpO2: 97 % (1818)  Level of Consciousness: Level of Consciousness (AVPU): alert  Respiratory Effort: Respiratory Effort: Unlabored  Expansion/Accessory Muscle Usage:    All Lung Field Breath Sounds: All Lung Fields Breath Sounds: Anterior:, Posterior:  NILES Breath Sounds: clear  LLL Breath Sounds: diminished  RUL Breath Sounds: diminished  RML Breath Sounds: diminished  RLL Breath Sounds: diminished  O2 Device/Concentration: R.A.  NIPPV: No Surgical airway: No  Vent: No  ETCO2 monitored:    Ambu at bedside:      Active Orders   Respiratory Care    CPAP QHS     Frequency: QHS     Number of Occurrences: Until Specified     Order Questions:      Expiratory pressure: 5    POCT Venous Blood Gas     Frequency: Once     Number of Occurrences: 1 Occurrences     Order Comments: This test should be used for VBGs.  If using this order for other tests (K, creatinine, HCT, PT/INR, lactate etc)  ONLY do so in the case of an emergency or rapid response.Notify Physician if: see parameters below.         Order Questions:      Component: Blood Gas    Pulse Oximetry Continuous     Frequency: Continuous     Number of Occurrences: Until Specified       RECOMMENDATIONS  ?  We recommend: RRT Recs: EKG ordered Pt to be monitored 2    ESCALATION        FOLLOW-UP    Disposition: Remain in room 331.    Please call back the Rapid Response RTDaniel RRT at x [phone] for any questions or concerns.

## 2024-01-01 NOTE — CARE UPDATE
Called for arrhythmia seen on tele ~ 5:30pm after remdesivir was given. Patient was hemodynamically stable, hypotensive at baseline. No chest pain. Cardiology called and suspect Afib RVR. Amiodarone bolus and drip started.

## 2024-01-01 NOTE — ASSESSMENT & PLAN NOTE
ASA 325mg qd per home medication list. Reason for this dose unclear. Will confirm with cardiology  - Will discuss BB with cardiology while patient in on amio gtt

## 2024-01-01 NOTE — PROGRESS NOTES
[facility] - Cardiology [facility] Medicine  Progress Note    Patient Name: Papito Bhakta  MRN: [id number]  Patient Class: OP- Observation   Admission Date: 12/29/2023  Length of Stay: 0 days  Attending Physician: Milly Mendoza MD  Primary Care Provider: Brynn To MD        Subjective:     Principal Problem:Acute on chronic combined systolic and diastolic CHF (congestive heart failure)        HPI:  Papito Bhakta is a 68 y.o. male with NICM, combined CHF(11/2023 EF 10 -15%, G3DD) s/p ICD placement, CKD, HLD, HTN, and AMELIA who presents for bilateral lower extremity swelling and shortness of breath. Patient reports he has been having worsening shortness of breath for the past 6 months. He had acutely worsening SOB today where he described becoming profoundly dyspneic on exertion. He reported taking 2 of his 80 mg Lasix this morning with subsequent minimal urine output and minimal improvement in his SOB. He reports SOB aggravated with lying down and he requires frequent positional changes to keep comfortable. He reports additional poor appetite and urine output for the past week although he reports he has been drinking well. He had 2 episodes of nausea/vomiting this past week as well. He denies fever/chills, chest pain, abdominal pain, recent illness, medication changes. Patient reports adherence with all medications. He reports he lives with his daughter who helps him with his medications and healthcare.    Additionally he reports chronic leg pain from a chronic RLE wound. He went to wound care yesterday where dressings were changed and he was told they were healing well. He has bilateral lower extremity edema R>L that is typical for him and he denies recent worsening.    Overview/Hospital Course:  Pt admitted to hospital medicine for acute heart failure exacerbation. Continuing diuresis with lasix 80 IV BID. Also complaining of inability to swallow for 3 months duration. SLP evaluated the patient and  determined that he could tolerate liquids. GI has been consulted, planning for EGD. New leukocytosis noted 12/31, work up significant for COVID positive. Started remdesivir, steroids held in order to prevent worsening fluid retention. Overnight 12/31, noted to be tachycardic and hypotensive (asymptomatic). Cardiology called, thought to be AF RVR and po amio transitioned to amio gtt. EP consulted for device interrogation.    Interval History: Pt reports fatigue and overall feeling poorly today. AF RVR overnight, now on amio gtt. Cardiology consulted.     Review of Systems  Objective:     Vital Signs (Most Recent):  Temp: 97.5 °F (36.4 °C) (01/01/24 1150)  Pulse: 75 (01/01/24 1150)  Resp: 20 (01/01/24 1150)  BP: (!) 92/59 (01/01/24 1150)  SpO2: 99 % (01/01/24 1150) Vital Signs (24h Range):  Temp:  [97.5 °F (36.4 °C)-97.6 °F (36.4 °C)] 97.5 °F (36.4 °C)  Pulse:  [] 75  Resp:  [16-22] 20  SpO2:  [93 %-100 %] 99 %  BP: ()/(42-84) 92/59     Weight: 108.7 kg (239 lb 10.2 oz)  Body mass index is 28.42 kg/m².    Intake/Output Summary (Last 24 hours) at 1/1/2024 1248  Last data filed at 1/1/2024 1100  Gross per 24 hour   Intake 1518.12 ml   Output 1925 ml   Net -406.88 ml         Physical Exam  Vitals and nursing note reviewed.   Constitutional:       Appearance: He is ill-appearing.   HENT:      Head: Normocephalic and atraumatic.      Mouth/Throat:      Mouth: Mucous membranes are moist.   Eyes:      General: Scleral icterus present.      Extraocular Movements: Extraocular movements intact.      Comments: Anisocoria   Cardiovascular:      Rate and Rhythm: Normal rate and regular rhythm.      Heart sounds:      Gallop present.   Pulmonary:      Effort: Pulmonary effort is normal. No respiratory distress.      Breath sounds: Rales (bilateral) present. No wheezing.   Abdominal:      General: Abdomen is flat. There is no distension.      Palpations: Abdomen is soft.      Tenderness: There is no abdominal tenderness.    Musculoskeletal:      Left lower leg: Tenderness present. Edema present.      Comments: Wound dressings on RLE   Skin:     General: Skin is warm and dry.   Neurological:      Mental Status: He is alert and oriented to person, place, and time.   Psychiatric:         Mood and Affect: Mood normal.         Behavior: Behavior normal.             Significant Labs: All pertinent labs within the past 24 hours have been reviewed.    Significant Imaging: I have reviewed all pertinent imaging results/findings within the past 24 hours.    Assessment/Plan:      * Acute on chronic combined systolic and diastolic CHF (congestive heart failure)  Patient is identified as having Combined Systolic and Diastolic heart failure that is Acute on chronic. CHF is currently uncontrolled due to Pulmonary edema/pleural effusion on CXR. Latest ECHO performed and demonstrates- Results for orders placed during the hospital encounter of 11/10/23    Echo    Interpretation Summary    Left Ventricle: The left ventricle is severely dilated. Mildly increased wall thickness. There is mild concentric hypertrophy. Severe global hypokinesis present. There is severely reduced systolic function with a visually estimated ejection fraction of 10 -15%. Grade III diastolic dysfunction.    Right Ventricle: Severe right ventricular enlargement. Systolic function is severely reduced.    Mitral Valve: There is no stenosis. There is mild to moderate regurgitation with a centrally directed jet.    Tricuspid Valve: There is mild to moderate regurgitation.    Pulmonary Artery: The estimated pulmonary artery systolic pressure is 67 mmHg.  .Last BNP reviewed- and noted below   Recent Labs   Lab 01/01/24  0551   BNP 2,684*         Presented for acute on chronic SOB with associated low UOP. Afebrile HDS. BNP 3,067, Troponin 0.031. CXR with cardiomegaly, vascular congestion, and edema. Received 100 of Lasix in the ED.    - RIP pending  - IV lasix 80 BID. If urine output  suboptimal consider increasing dose or additional diuretics in combination  - continue home Jardiance   - Beta blocker/aldactone held given soft pressures, resume when appropriate  - Cardiac diet with Fluid restriction at 1.5L with strict I/Os and daily STANDING weights  - Check Electrolytes, keep Mag >2 & K+ >4  - SCDs, Ambulate as tolerated    - Strict I&Os, Daily standing weights  - Review Low-salt diet and enforce medication adherence on discharge    COVID-19  COVID positive, noted 12/31  - Will treat with remdesivir  - Hold steroids at this time, hesitant to exacerbate fluid retention     Microcytic anemia  Iron studies notable for Fe 26 and sat iron 8%. TIBC, ferritin, transferrin wnl. Likely iron deficiency anemia.    - Starting daily iron tablet  - Daily CBCs    Dysphagia  Reports a 3 month history of inability to tolerate solid foods. SLP has assessed him, can tolerate liquids.     - GI consulted, appreciate recommendations  - Adding boost to meals      Pupil asymmetry  Notable asymetry on pupils by patient. Chart review shows left orbital trauma in 2021 with notes concerned for dilation and vision changes. When talking to daughter, this is chronic. States no vision changes or neuro symptoms whatsoever.     Serum total bilirubin elevated  Direct bili 4.5. No signs of hemolysis.    - Daily CMPs      Nausea and vomiting  Complaints of several day history of N/V on admission. Also noted 3 month history of difficulty swallowing solid foods.    - Consulted SLP, able to tolerate liquids  - Consulted GI, appreciate recommendations      Acute renal failure superimposed on stage 3a chronic kidney disease  Creatinine 2.1 on admit, baseline around 1.4. Likely prerenal etiology given urine sodium and FENa vs progression of CKD. Given stability of Cr, likely new baseline.   Recent Labs     12/31/23  0443 12/31/23  1804 01/01/24  0551   BUN 69* 61* 66*   CREATININE 2.4* 2.0* 2.1*         Estimated Creatinine Clearance:  46.1 mL/min (A) (based on SCr of 2.1 mg/dL (H)).    - Renal US: renal cysts, no hydronephrosis  - Urine Na: 14, Pr/Cr: 0.72; FENa 0.2%  - Monitor urine output and serial BMP and adjust therapy as needed.   - Strict I&Os and daily weights   - Avoid nephrotoxic agents such as NSAIDs, gadolinium and IV radiocontrast.  - Renally dose meds to current GFR.  - Maintain MAP > 65.  - Nephrology referral outpatient    AMELIA (obstructive sleep apnea)  Carried diagnosis of AMELIA per chart, however he does not sleep with CPAP at home and declines while here      NICM (nonischemic cardiomyopathy)  ASA 325mg qd per home medication list. Reason for this dose unclear. Will confirm with cardiology  - Will discuss BB with cardiology while patient in on amio gtt      Biventricular ICD (implantable cardioverter-defibrillator) in place  Stable on admission, no acute issues, he denies discharge. QTc chronically prolonged. Continue home amiodarone for NSVT prevention. Monitor on telemetry   -- 12/31, noted to have AF RVR with hypotension. Started on amio gtt. Per report, pacemaker malfunctioning. EP consulted for formal evaluation     Hyperlipidemia  Stable. Continue Home Pravastatin.        Essential hypertension  Chronic, controlled. Latest blood pressure and vitals reviewed-     Temp:  [97.5 °F (36.4 °C)-97.6 °F (36.4 °C)]   Pulse:  []   Resp:  [16-22]   BP: ()/(42-84)   SpO2:  [93 %-100 %] .   Home meds for hypertension were reviewed and noted below.   Hypertension Medications               furosemide (LASIX) 80 MG tablet TAKE 1 TABLET EVERY DAY    metoprolol succinate (TOPROL-XL) 50 MG 24 hr tablet TAKE 1 TABLET EVERY DAY    spironolactone (ALDACTONE) 25 MG tablet TAKE 1/2 TABLET (12.5 MG TOTAL) ONCE DAILY. HOLD IF SYSTOLIC BLOOD PRESSURE IS LESS THAN 110            While in the hospital, will manage blood pressure as follows; Adjust home antihypertensive regimen as follows- Holding in setting of borderline pressures in  setting of new diuresis     Will utilize p.r.n. blood pressure medication only if patient's blood pressure greater than 180/110 and he develops symptoms such as worsening chest pain or shortness of breath.      VTE Risk Mitigation (From admission, onward)           Ordered     heparin (porcine) injection 5,000 Units  Every 8 hours         12/29/23 1759     IP VTE HIGH RISK PATIENT  Once         12/29/23 1759     Place sequential compression device  Until discontinued         12/29/23 1759                    Discharge Planning   MARIIA: 1/3/2024     Code Status: Full Code   Is the patient medically ready for discharge?: No    Reason for patient still in hospital (select all that apply): Patient unstable                     Jayda Jennings, DO  Department of Hospital Medicine   [facility] - Cardiology Stepdown

## 2024-01-01 NOTE — SUBJECTIVE & OBJECTIVE
Interval History: Pt reports fatigue and overall feeling poorly today. AF RVR overnight, now on amio gtt. Cardiology consulted.     Review of Systems  Objective:     Vital Signs (Most Recent):  Temp: 97.5 °F (36.4 °C) (01/01/24 1150)  Pulse: 75 (01/01/24 1150)  Resp: 20 (01/01/24 1150)  BP: (!) 92/59 (01/01/24 1150)  SpO2: 99 % (01/01/24 1150) Vital Signs (24h Range):  Temp:  [97.5 °F (36.4 °C)-97.6 °F (36.4 °C)] 97.5 °F (36.4 °C)  Pulse:  [] 75  Resp:  [16-22] 20  SpO2:  [93 %-100 %] 99 %  BP: ()/(42-84) 92/59     Weight: 108.7 kg (239 lb 10.2 oz)  Body mass index is 28.42 kg/m².    Intake/Output Summary (Last 24 hours) at 1/1/2024 1248  Last data filed at 1/1/2024 1100  Gross per 24 hour   Intake 1518.12 ml   Output 1925 ml   Net -406.88 ml         Physical Exam  Vitals and nursing note reviewed.   Constitutional:       Appearance: He is ill-appearing.   HENT:      Head: Normocephalic and atraumatic.      Mouth/Throat:      Mouth: Mucous membranes are moist.   Eyes:      General: Scleral icterus present.      Extraocular Movements: Extraocular movements intact.      Comments: Anisocoria   Cardiovascular:      Rate and Rhythm: Normal rate and regular rhythm.      Heart sounds:      Gallop present.   Pulmonary:      Effort: Pulmonary effort is normal. No respiratory distress.      Breath sounds: Rales (bilateral) present. No wheezing.   Abdominal:      General: Abdomen is flat. There is no distension.      Palpations: Abdomen is soft.      Tenderness: There is no abdominal tenderness.   Musculoskeletal:      Left lower leg: Tenderness present. Edema present.      Comments: Wound dressings on RLE   Skin:     General: Skin is warm and dry.   Neurological:      Mental Status: He is alert and oriented to person, place, and time.   Psychiatric:         Mood and Affect: Mood normal.         Behavior: Behavior normal.             Significant Labs: All pertinent labs within the past 24 hours have been  reviewed.    Significant Imaging: I have reviewed all pertinent imaging results/findings within the past 24 hours.

## 2024-01-01 NOTE — ASSESSMENT & PLAN NOTE
Carried diagnosis of AMELIA per chart, however he does not sleep with CPAP at home and declines while here

## 2024-01-01 NOTE — ASSESSMENT & PLAN NOTE
Creatinine 2.1 on admit, baseline around 1.4. Likely prerenal etiology given urine sodium and FENa vs progression of CKD. Given stability of Cr, likely new baseline.   Recent Labs     12/31/23  0443 12/31/23  1804 01/01/24  0551   BUN 69* 61* 66*   CREATININE 2.4* 2.0* 2.1*         Estimated Creatinine Clearance: 46.1 mL/min (A) (based on SCr of 2.1 mg/dL (H)).    - Renal US: renal cysts, no hydronephrosis  - Urine Na: 14, Pr/Cr: 0.72; FENa 0.2%  - Monitor urine output and serial BMP and adjust therapy as needed.   - Strict I&Os and daily weights   - Avoid nephrotoxic agents such as NSAIDs, gadolinium and IV radiocontrast.  - Renally dose meds to current GFR.  - Maintain MAP > 65.  - Nephrology referral outpatient

## 2024-01-01 NOTE — NURSING
17:21 Patient rhythm changed from atrial paced to VTAC/VFIB on  the heart monitor. Pads  placed on  patient,  EKG  and labs ordered, blood  pressure  80/40's  manually,  but no  complaints from  the  patient  at this time. Charge nurse, Med  team  called,  and rapid came  to  bedside  to  place eyes on the patient, once med team arrived they called  cardiology who looked  at the  patients rhythm  and decided  to start a Amio drip and ICD interrogation. Patients BP's are now 90/50's which  is more  baseline  for him, IV drip  has been started,  no complaints at this time, will  cont  to la nena.

## 2024-01-01 NOTE — CARE UPDATE
RAPID RESPONSE NURSE PROACTIVE ROUNDING NOTE       Time of Visit:     Admit Date: 2023  LOS: 0  Code Status: Full Code   Date of Visit: 2023  : 1955  Age: 68 y.o.  Sex: male  Race: Black or   Bed: 331/331 A:   MRN: [id number]  Was the patient discharged from an ICU this admission? No   Was the patient discharged from a PACU within last 24 hours? No   Did the patient receive conscious sedation/general anesthesia in last 24 hours? No  Was the patient in the ED within the past 24 hours? No  Was the patient on NIPPV within the past 24 hours? No   Attending Physician: Milly Mendoza MD  Primary Service: [facility]   Time spent at the bedside: < 15 min    SITUATION    Notified by charge RN via phone call.  Reason for alert: dysrythmia  Called to evaluate the patient for Circulatory    BACKGROUND     Why is the patient in the hospital?: Acute on chronic combined systolic and diastolic CHF (congestive heart failure)    Patient has a past medical history of RIGOBERTO (acute kidney injury), Anticoagulant long-term use, CHF (congestive heart failure), Hyperlipidemia, Hypertension, Microcytic anemia, NICM (nonischemic cardiomyopathy), Obesity, AMELIA (obstructive sleep apnea), and Peripheral vascular disease, unspecified.    Last Vitals:  Temp: 97.5 °F (36.4 °C) ( 151)  Pulse: 115 (1818)  Resp: 18 (1809)  BP: 90/62 (1818)  SpO2: 97 % (1818)    24 Hours Vitals Range:  Temp:  [97 °F (36.1 °C)-98.2 °F (36.8 °C)]   Pulse:  []   Resp:  [16-20]   BP: ()/(42-73)   SpO2:  [93 %-100 %]     Labs:  Recent Labs     23  1604 23  0635 23  0443   WBC 6.10 11.25 14.95*   HGB 13.8* 13.8* 13.5*   HCT 43.0 42.3 40.1   * 123* 135*       Recent Labs     23  0635 23  1415 23  0443 23  1804   * 135* 133* 138   K 5.0 4.5 4.0 3.8   CL 95 96 98 100   CO2 16* 17* 24 24   BUN 59* 64* 69* 61*   CREATININE 2.4* 2.3* 2.4* 2.0*    GLU 42* 89 122* 119*   PHOS 5.4*  --  3.5  --    MG 2.1  --  2.2 2.1        Recent Labs     12/30/23  1522   PH 7.379   PCO2 39.9   PO2 23*   HCO3 23.5*   POCSATURATED 40   BE -2        ASSESSMENT    Physical Exam    INTERVENTIONS    The patient was seen for Cardiac problem. Staff concerns included tachycardia. The following interventions were performed: EKG, continuous cardiac monitoring continued, and MD at bedside.    RECOMMENDATIONS    Patient resting comfortable.  Heart rate 105-118, see flowsheet.  12 Lead EKG obtained and labs ordered.  Cardiology notified for recommendations, Amiodarone ordered.    PROVIDER ESCALATION    Yes/No  MD at bedside    Orders received and case discussed with NA    Disposition: Remain in room 331.    FOLLOW-UP    Charge RN, Claudette  updated on plan of care. Instructed to call the Rapid Response Nurse, Rachelle Helton, RN at [phone] for additional questions or concerns.

## 2024-01-02 ENCOUNTER — DOCUMENTATION ONLY (OUTPATIENT)
Dept: ELECTROPHYSIOLOGY | Facility: CLINIC | Age: 69
End: 2024-01-02
Payer: MEDICARE

## 2024-01-02 ENCOUNTER — TELEPHONE (OUTPATIENT)
Dept: WOUND CARE | Facility: CLINIC | Age: 69
End: 2024-01-02
Payer: MEDICARE

## 2024-01-02 DIAGNOSIS — I50.42 CHRONIC COMBINED SYSTOLIC AND DIASTOLIC CONGESTIVE HEART FAILURE: Primary | ICD-10-CM

## 2024-01-02 LAB
ALBUMIN SERPL BCP-MCNC: 2.6 G/DL (ref 3.5–5.2)
ALP SERPL-CCNC: 97 U/L (ref 55–135)
ALT SERPL W/O P-5'-P-CCNC: 41 U/L (ref 10–44)
ANION GAP SERPL CALC-SCNC: 16 MMOL/L (ref 8–16)
AST SERPL-CCNC: 46 U/L (ref 10–40)
BILIRUB SERPL-MCNC: 5.4 MG/DL (ref 0.1–1)
BUN SERPL-MCNC: 65 MG/DL (ref 8–23)
CALCIUM SERPL-MCNC: 9.5 MG/DL (ref 8.7–10.5)
CHLORIDE SERPL-SCNC: 98 MMOL/L (ref 95–110)
CO2 SERPL-SCNC: 21 MMOL/L (ref 23–29)
CREAT SERPL-MCNC: 1.9 MG/DL (ref 0.5–1.4)
ERYTHROCYTE [DISTWIDTH] IN BLOOD BY AUTOMATED COUNT: 21.3 % (ref 11.5–14.5)
EST. GFR  (NO RACE VARIABLE): 37.9 ML/MIN/1.73 M^2
GLUCOSE SERPL-MCNC: 133 MG/DL (ref 70–110)
HCT VFR BLD AUTO: 40.9 % (ref 40–54)
HGB BLD-MCNC: 14.2 G/DL (ref 14–18)
MAGNESIUM SERPL-MCNC: 2.2 MG/DL (ref 1.6–2.6)
MCH RBC QN AUTO: 23.5 PG (ref 27–31)
MCHC RBC AUTO-ENTMCNC: 34.7 G/DL (ref 32–36)
MCV RBC AUTO: 68 FL (ref 82–98)
PHOSPHATE SERPL-MCNC: 3.1 MG/DL (ref 2.7–4.5)
PLATELET # BLD AUTO: 96 K/UL (ref 150–450)
PMV BLD AUTO: ABNORMAL FL (ref 9.2–12.9)
POTASSIUM SERPL-SCNC: 3.9 MMOL/L (ref 3.5–5.1)
PROT SERPL-MCNC: 6.9 G/DL (ref 6–8.4)
RBC # BLD AUTO: 6.05 M/UL (ref 4.6–6.2)
SODIUM SERPL-SCNC: 135 MMOL/L (ref 136–145)
WBC # BLD AUTO: 10.26 K/UL (ref 3.9–12.7)

## 2024-01-02 PROCEDURE — 25000003 PHARM REV CODE 250

## 2024-01-02 PROCEDURE — 63600175 PHARM REV CODE 636 W HCPCS: Mod: JZ,TB | Performed by: STUDENT IN AN ORGANIZED HEALTH CARE EDUCATION/TRAINING PROGRAM

## 2024-01-02 PROCEDURE — 85027 COMPLETE CBC AUTOMATED: CPT | Performed by: STUDENT IN AN ORGANIZED HEALTH CARE EDUCATION/TRAINING PROGRAM

## 2024-01-02 PROCEDURE — 63600175 PHARM REV CODE 636 W HCPCS

## 2024-01-02 PROCEDURE — 36415 COLL VENOUS BLD VENIPUNCTURE: CPT | Performed by: STUDENT IN AN ORGANIZED HEALTH CARE EDUCATION/TRAINING PROGRAM

## 2024-01-02 PROCEDURE — 25000003 PHARM REV CODE 250: Performed by: STUDENT IN AN ORGANIZED HEALTH CARE EDUCATION/TRAINING PROGRAM

## 2024-01-02 PROCEDURE — 84100 ASSAY OF PHOSPHORUS: CPT | Performed by: STUDENT IN AN ORGANIZED HEALTH CARE EDUCATION/TRAINING PROGRAM

## 2024-01-02 PROCEDURE — 96372 THER/PROPH/DIAG INJ SC/IM: CPT

## 2024-01-02 PROCEDURE — 27000207 HC ISOLATION

## 2024-01-02 PROCEDURE — 20600001 HC STEP DOWN PRIVATE ROOM

## 2024-01-02 PROCEDURE — 25000242 PHARM REV CODE 250 ALT 637 W/ HCPCS

## 2024-01-02 PROCEDURE — 80053 COMPREHEN METABOLIC PANEL: CPT | Performed by: STUDENT IN AN ORGANIZED HEALTH CARE EDUCATION/TRAINING PROGRAM

## 2024-01-02 PROCEDURE — 92526 ORAL FUNCTION THERAPY: CPT

## 2024-01-02 PROCEDURE — 83735 ASSAY OF MAGNESIUM: CPT | Performed by: STUDENT IN AN ORGANIZED HEALTH CARE EDUCATION/TRAINING PROGRAM

## 2024-01-02 RX ORDER — AMOXICILLIN 250 MG
1 CAPSULE ORAL DAILY
Status: DISCONTINUED | OUTPATIENT
Start: 2024-01-02 | End: 2024-01-14

## 2024-01-02 RX ADMIN — HEPARIN SODIUM 5000 UNITS: 5000 INJECTION INTRAVENOUS; SUBCUTANEOUS at 02:01

## 2024-01-02 RX ADMIN — FERROUS SULFATE TAB 325 MG (65 MG ELEMENTAL FE) 1 EACH: 325 (65 FE) TAB at 10:01

## 2024-01-02 RX ADMIN — FUROSEMIDE 80 MG: 10 INJECTION, SOLUTION INTRAVENOUS at 08:01

## 2024-01-02 RX ADMIN — HEPARIN SODIUM 5000 UNITS: 5000 INJECTION INTRAVENOUS; SUBCUTANEOUS at 05:01

## 2024-01-02 RX ADMIN — AMIODARONE HYDROCHLORIDE 0.5 MG/MIN: 1.8 INJECTION, SOLUTION INTRAVENOUS at 02:01

## 2024-01-02 RX ADMIN — EMPAGLIFLOZIN 10 MG: 10 TABLET, FILM COATED ORAL at 10:01

## 2024-01-02 RX ADMIN — FUROSEMIDE 80 MG: 10 INJECTION, SOLUTION INTRAVENOUS at 10:01

## 2024-01-02 RX ADMIN — ASPIRIN 325 MG: 325 TABLET, COATED ORAL at 10:01

## 2024-01-02 RX ADMIN — AMIODARONE HYDROCHLORIDE 0.5 MG/MIN: 1.8 INJECTION, SOLUTION INTRAVENOUS at 01:01

## 2024-01-02 RX ADMIN — REMDESIVIR 100 MG: 100 INJECTION, POWDER, LYOPHILIZED, FOR SOLUTION INTRAVENOUS at 10:01

## 2024-01-02 RX ADMIN — SENNOSIDES AND DOCUSATE SODIUM 1 TABLET: 8.6; 5 TABLET ORAL at 05:01

## 2024-01-02 RX ADMIN — HEPARIN SODIUM 5000 UNITS: 5000 INJECTION INTRAVENOUS; SUBCUTANEOUS at 10:01

## 2024-01-02 RX ADMIN — PRAVASTATIN SODIUM 80 MG: 40 TABLET ORAL at 10:01

## 2024-01-02 NOTE — SUBJECTIVE & OBJECTIVE
Interval History: Telemetry overnight with 1 episode of NSVT. Appears to be V paced.      Review of Systems   Constitutional: Negative for diaphoresis and fever.   Cardiovascular:  Negative for chest pain, dyspnea on exertion, leg swelling, near-syncope, orthopnea, palpitations, paroxysmal nocturnal dyspnea and syncope.   Respiratory:  Negative for cough and shortness of breath.    Gastrointestinal:  Negative for abdominal pain, diarrhea, nausea and vomiting.   Neurological:  Negative for light-headedness.   Psychiatric/Behavioral:  Negative for altered mental status and substance abuse.      Objective:     Vital Signs (Most Recent):  Temp: 99 °F (37.2 °C) (01/02/24 0815)  Pulse: 68 (01/02/24 1108)  Resp: 20 (01/02/24 0815)  BP: 97/62 (01/02/24 1025)  SpO2: 100 % (01/02/24 0815) Vital Signs (24h Range):  Temp:  [97.3 °F (36.3 °C)-99 °F (37.2 °C)] 99 °F (37.2 °C)  Pulse:  [65-77] 68  Resp:  [18-22] 20  SpO2:  [98 %-100 %] 100 %  BP: ()/(59-78) 97/62     Weight: 111.5 kg (245 lb 13 oz)  Body mass index is 29.15 kg/m².     SpO2: 100 %        Physical Exam  Constitutional:       General: He is not in acute distress.     Appearance: Normal appearance. He is well-developed.   HENT:      Mouth/Throat:      Mouth: Mucous membranes are moist.      Pharynx: Oropharynx is clear.   Cardiovascular:      Rate and Rhythm: Normal rate and regular rhythm.      Heart sounds: Normal heart sounds. No murmur heard.  Pulmonary:      Effort: Pulmonary effort is normal. No respiratory distress.      Breath sounds: Normal breath sounds.   Abdominal:      Palpations: Abdomen is soft.      Tenderness: There is no abdominal tenderness. There is no guarding.   Musculoskeletal:         General: No swelling. Normal range of motion.      Right lower leg: No edema.      Left lower leg: No edema.   Skin:     General: Skin is warm and dry.   Neurological:      Mental Status: He is alert and oriented to person, place, and time.   Psychiatric:          Mood and Affect: Mood normal.         Behavior: Behavior normal.            Significant Labs: All pertinent lab results from the last 24 hours have been reviewed.    Significant Imaging:  Reviewed    Cardiac Device check 12/12/23  Presenting EGM As/Ap - BVp. Battery status is OK. Measured values in normal range. No new events since last transmission. 0% AT/AF [place]     EKG 12/29/23  Atrial-sensed ventricular-paced rhythm      EKG 12/06/23  Dual Pacer      TTE 08/18/23    Left Ventricle: The left ventricle is severely dilated. Increased ventricular mass. Normal wall thickness. Global hypokinesis present. There is severely reduced systolic function with a visually estimated ejection fraction of 15 - 20%. There is diastolic dysfunction.    Left Atrium: Left atrium is severely dilated.    Right Ventricle: Severe right ventricular enlargement. Wall thickness is normal. Systolic function is moderately reduced. Pacemaker lead present in the ventricle.    Right Atrium: Right atrium is dilated.    Mitral Valve: Mild mitral annular calcification. There is moderate regurgitation.    Tricuspid Valve: There is mild regurgitation.    Pulmonic Valve: There is mild regurgitation.    Pulmonary Artery: The estimated pulmonary artery systolic pressure is 59 mmHg.    IVC/SVC: Elevated venous pressure at 15 mmHg.     CXR 12/31/23  - Lines and tubes: Right chest wall biventricular AICD.  - Heart and mediastinum: Enlarged.  - Pleura: Trace right pleural effusion.  No pneumothorax.  - Lungs: Lungs are well inflated. Interstitial pulmonary edema, similar to prior.  No new focal consolidation.  - Soft tissue/bone: Unchanged.

## 2024-01-02 NOTE — ASSESSMENT & PLAN NOTE
Reports a 3 month history of inability to tolerate solid foods. SLP has assessed him, can tolerate liquids.     - GI consulted, planning for EGD tomorrow  - Adding boost to meals

## 2024-01-02 NOTE — SUBJECTIVE & OBJECTIVE
Interval History: NAEO. Still having vomiting when trying to eat solid foods. Also noted b/l upper extremity weakness that has been present for at least a month. Had a 13 beat run of NSVT yesterday and some periods of bradycardia.     Review of Systems   Respiratory:  Negative for cough and shortness of breath.    Cardiovascular:  Negative for chest pain and palpitations.   Gastrointestinal:  Positive for vomiting. Negative for nausea.   Neurological:  Positive for weakness.     Objective:     Vital Signs (Most Recent):  Temp: 97.8 °F (36.6 °C) (01/02/24 1212)  Pulse: 60 (01/02/24 1212)  Resp: 18 (01/02/24 1212)  BP: (!) 99/59 (01/02/24 1212)  SpO2: 98 % (01/02/24 1212) Vital Signs (24h Range):  Temp:  [97.3 °F (36.3 °C)-99 °F (37.2 °C)] 97.8 °F (36.6 °C)  Pulse:  [60-77] 60  Resp:  [18-22] 18  SpO2:  [98 %-100 %] 98 %  BP: ()/(59-78) 99/59     Weight: 111.5 kg (245 lb 13 oz)  Body mass index is 29.15 kg/m².    Intake/Output Summary (Last 24 hours) at 1/2/2024 1234  Last data filed at 1/2/2024 1059  Gross per 24 hour   Intake 80.94 ml   Output 1350 ml   Net -1269.06 ml         Physical Exam  Vitals and nursing note reviewed.   Constitutional:       Appearance: He is ill-appearing.   HENT:      Head: Normocephalic and atraumatic.      Mouth/Throat:      Mouth: Mucous membranes are moist.   Eyes:      General: Scleral icterus present.      Extraocular Movements: Extraocular movements intact.      Comments: Anisocoria   Cardiovascular:      Rate and Rhythm: Normal rate and regular rhythm.      Heart sounds:      Gallop present.   Pulmonary:      Effort: Pulmonary effort is normal. No respiratory distress.      Breath sounds: Normal breath sounds. No wheezing.   Abdominal:      General: Abdomen is flat. There is no distension.      Palpations: Abdomen is soft.      Tenderness: There is no abdominal tenderness.   Musculoskeletal:         General: Tenderness present.      Right lower leg: Edema present.      Left  lower leg: Edema present.      Comments: Wound dressings on RLE and LLE   Skin:     General: Skin is warm and dry.   Neurological:      Mental Status: He is alert and oriented to person, place, and time.      Motor: Weakness (Difficulty with raising arms) present.   Psychiatric:         Mood and Affect: Mood normal.         Behavior: Behavior normal.             Significant Labs: All pertinent labs within the past 24 hours have been reviewed.    Significant Imaging: I have reviewed all pertinent imaging results/findings within the past 24 hours.

## 2024-01-02 NOTE — PLAN OF CARE
Problem: Adult Inpatient Plan of Care  Goal: Plan of Care Review  Outcome: Ongoing, Progressing  Goal: Patient-Specific Goal (Individualized)  Outcome: Ongoing, Progressing  Goal: Absence of Hospital-Acquired Illness or Injury  Outcome: Ongoing, Progressing  Goal: Optimal Comfort and Wellbeing  Outcome: Ongoing, Progressing  Goal: Readiness for Transition of Care  Outcome: Ongoing, Progressing     Problem: Fluid and Electrolyte Imbalance (Acute Kidney Injury/Impairment)  Goal: Fluid and Electrolyte Balance  Outcome: Ongoing, Progressing     Problem: Oral Intake Inadequate (Acute Kidney Injury/Impairment)  Goal: Optimal Nutrition Intake  Outcome: Ongoing, Progressing     Problem: Renal Function Impairment (Acute Kidney Injury/Impairment)  Goal: Effective Renal Function  Outcome: Ongoing, Progressing     Problem: Impaired Wound Healing  Goal: Optimal Wound Healing  Outcome: Ongoing, Progressing     Problem: Adjustment to Illness (Heart Failure)  Goal: Optimal Coping  Outcome: Ongoing, Progressing     Problem: Cardiac Output Decreased (Heart Failure)  Goal: Optimal Cardiac Output  Outcome: Ongoing, Progressing     Problem: Dysrhythmia (Heart Failure)  Goal: Stable Heart Rate and Rhythm  Outcome: Ongoing, Progressing     Problem: Fluid Imbalance (Heart Failure)  Goal: Fluid Balance  Outcome: Ongoing, Progressing     Problem: Functional Ability Impaired (Heart Failure)  Goal: Optimal Functional Ability  Outcome: Ongoing, Progressing     Problem: Oral Intake Inadequate (Heart Failure)  Goal: Optimal Nutrition Intake  Outcome: Ongoing, Progressing     Problem: Respiratory Compromise (Heart Failure)  Goal: Effective Oxygenation and Ventilation  Outcome: Ongoing, Progressing     Problem: Sleep Disordered Breathing (Heart Failure)  Goal: Effective Breathing Pattern During Sleep  Outcome: Ongoing, Progressing     Problem: Skin Injury Risk Increased  Goal: Skin Health and Integrity  Outcome: Ongoing, Progressing   Plan of  care discussed with patient. Patient is free of fall/trauma/injury. Denies CP, SOB, or pain/discomfort. All questions addressed. Will continue to monitor

## 2024-01-02 NOTE — CONSULTS
[facility] - Cardiology Stepdown  Wound Care    Patient Name:  Papito Bhakta   MRN:  [id number]  Date: 1/2/2024  Diagnosis: Acute on chronic combined systolic and diastolic CHF (congestive heart failure)    History:     Past Medical History:   Diagnosis Date    RIGOBERTO (acute kidney injury) 10/7/2020    Anticoagulant long-term use     Aspirin    CHF (congestive heart failure)     Hyperlipidemia     Hypertension     Microcytic anemia 12/31/2023    NICM (nonischemic cardiomyopathy) 6/16/2020    Obesity     AMELIA (obstructive sleep apnea) 1/7/2022    Peripheral vascular disease, unspecified 2/1/2021       Social History     Socioeconomic History    Marital status:    Tobacco Use    Smoking status: Former     Current packs/day: 0.00     Average packs/day: 0.5 packs/day for 40.0 years (20.0 ttl pk-yrs)     Types: Cigarettes, Cigars     Start date: 1974     Quit date: 2014     Years since quitting: 10.0     Passive exposure: Current    Smokeless tobacco: Never   Substance and Sexual Activity    Alcohol use: Yes     Comment: once a month beer or liquor    Drug use: No    Sexual activity: Not Currently     Birth control/protection: None     Comment: uses protection sometimes     Social Determinants of Health     Financial Resource Strain: Low Risk  (11/29/2023)    Overall Financial Resource Strain (CARDIA)     Difficulty of Paying Living Expenses: Not hard at all   Food Insecurity: No Food Insecurity (11/29/2023)    Hunger Vital Sign     Worried About Running Out of Food in the Last Year: Never true     Ran Out of Food in the Last Year: Never true   Transportation Needs: No Transportation Needs (11/29/2023)    PRAPARE - Transportation     Lack of Transportation (Medical): No     Lack of Transportation (Non-Medical): No   Physical Activity: Unknown (11/29/2023)    Exercise Vital Sign     Days of Exercise per Week: Patient declined     Minutes of Exercise per Session: 0 min   Social Connections: Unknown (11/29/2023)    Social  Connection and Isolation Panel [NHANES]     Frequency of Communication with Friends and Family: More than three times a week     Frequency of Social Gatherings with Friends and Family: More than three times a week     Active Member of Clubs or Organizations: No     Attends Club or Organization Meetings: Never     Marital Status:    Housing Stability: Low Risk  (11/29/2023)    Housing Stability Vital Sign     Unable to Pay for Housing in the Last Year: No     Number of Places Lived in the Last Year: 1     Unstable Housing in the Last Year: No       Precautions:     Allergies as of 12/29/2023 - Reviewed 12/29/2023   Allergen Reaction Noted    Ramipril  10/24/2023    Losartan Rash 09/13/2023       [facility] Assessment Details/Treatment   Patient seen for wound care consultation RLE.   Reviewed chart for this encounter.   See Flow Sheet for findings.      Per chart review. Papito Bhakta is a 68 y.o. male with NICM, combined CHF(11/2023 EF 10 -15%, G3DD) s/p ICD placement, CKD, HLD, HTN, and AMELIA who presents for bilateral lower extremity swelling and shortness of breath. Wound care cleansed left leg with normal saline before applying a Hydrofera Classic foam with a foam on top. Wound care cleansed right leg before applying a Hydrofera Classic foam before applying an abd pad on top and securing with a rolled gauze and tape. Pt denied any needs at this time. Supplies left at the bedside.    RECOMMENDATIONS:  - Bedside nurse to perform wound care to right leg:  Cleanse wound with normal saline  Pat dry.  Apply antibacterial foam dressing(Hydrofera Blue Classic) to wound bed  Cover with an abd pad.  Secure with a rolled gauze and tape.   Apply this every three days.  - Bedside nurse to perform wound care to left leg:  Cleanse wound with normal saline  Pat dry.  Apply antibacterial foam dressing(Hydrofera Blue Classic) to wound bed  Cover with a foam border dressing (Mepilex)   Apply this every three  days.      Recommendations made to primary team for above plan via secured chat. Wound Care to follow up. Orders placed.     Discussed POC with patient and primary RN.   See EMR for orders & patient education.  Discussed POC with primary team.    Nursing to continue care.  Nursing to maintain pressure injury prevention interventions.  Contact wound care for any further questions.         01/02/24 1500   WOCN Assessment   WOCN Total Time (mins) 45   Visit Date 01/02/24   Visit Time 1500   Consult Type New   WOCN Speciality Wound   Intervention assessed;changed;applied;chart review;coordination of care;orders   Teaching on-going        Altered Skin Integrity 12/30/23 0800 Left anterior;lower Leg Skin Tear Partial thickness tissue loss. Shallow open ulcer with a red or pink wound bed, without slough. Intact or Open/Ruptured Serum-filled blister.   Date First Assessed/Time First Assessed: 12/30/23 0800   Altered Skin Integrity Present on Admission - Did Patient arrive to the hospital with altered skin?: (c) yes  Side: Left  Orientation: anterior;lower  Location: Leg  Primary Wound Type: (c) Skin...   Wound Image    Dressing Appearance Intact;Clean;Moist drainage   Drainage Amount Moderate   Drainage Characteristics/Odor Clear;Yellow;No odor   Appearance Yellow;Moist   Tissue loss description Full thickness   Wound Length (cm) 1.4 cm   Wound Width (cm) 1.5 cm   Wound Depth (cm) 0.3 cm   Wound Volume (cm^3) 0.63 cm^3   Wound Surface Area (cm^2) 2.1 cm^2   Care Cleansed with:;Sterile normal saline   Dressing Applied;Methylene blue/gentian violet;Foam   Dressing Change Due 01/05/24        Altered Skin Integrity Right lower;medial Leg   No Date First Assessed or Time First Assessed found.   Side: Right  Orientation: lower;medial  Location: Leg   Wound Image    Dressing Appearance Dry;Intact;Clean   Drainage Amount Moderate   Drainage Characteristics/Odor Creamy;Purulent;Tan;Yellow;Malodorous   Appearance  Black;Yellow;Moist;Slough;Eschar   Tissue loss description Full thickness   Wound Length (cm) 8.2 cm   Wound Width (cm) 7.3 cm   Wound Depth (cm) 0.4 cm   Wound Volume (cm^3) 23.944 cm^3   Wound Surface Area (cm^2) 59.86 cm^2   Care Cleansed with:;Sterile normal saline   Dressing Applied;Methylene blue/gentian violet;Absorptive Pad;Rolled gauze   Dressing Change Due 01/05/24        Altered Skin Integrity Right lower;lateral Leg   No Date First Assessed or Time First Assessed found.   Side: Right  Orientation: lower;lateral  Location: Leg   Wound Image    Dressing Appearance Dry;Intact;Clean   Drainage Amount Moderate   Drainage Characteristics/Odor Serosanguineous;Malodorous   Appearance Red;Moist   Wound Length (cm) 13 cm   Wound Width (cm) 9 cm   Wound Depth (cm) 0.2 cm   Wound Volume (cm^3) 23.4 cm^3   Wound Surface Area (cm^2) 117 cm^2   Care Cleansed with:;Sterile normal saline   Dressing Applied;Methylene blue/gentian violet;Absorptive Pad;Rolled gauze   Dressing Change Due 01/05/24 01/02/2024

## 2024-01-02 NOTE — NURSING
Pt report given to receiving RN; Pt plan of care discussed; Pt VSS; Pt bed locked + lowered, Srx3, + call bell within reach.

## 2024-01-02 NOTE — PLAN OF CARE
Plan of Care Note    Device interrogation consistent with our interpretation of telemetry. Both indicate an irregular wide complex tachycardia consistent with a 2 hour period of atrial fibrillation versus V paced complexes in the setting of volume overload, COVID.    EP will sign off at this time. If these tachycardic episodes continue, would discuss with general cards for management.    Discussed with staff.    Connor Gillies, DO, PGY-IV  [facility] Fellow

## 2024-01-02 NOTE — PROGRESS NOTES
[facility] - Cardiology [facility] Medicine  Progress Note    Patient Name: Papito Bhakta  MRN: [id number]  Patient Class: IP- Inpatient   Admission Date: 12/29/2023  Length of Stay: 0 days  Attending Physician: Milly Mendoza MD  Primary Care Provider: Brynn To MD        Subjective:     Principal Problem:Acute on chronic combined systolic and diastolic CHF (congestive heart failure)        HPI:  Papito Bhakta is a 68 y.o. male with NICM, combined CHF(11/2023 EF 10 -15%, G3DD) s/p ICD placement, CKD, HLD, HTN, and AMELIA who presents for bilateral lower extremity swelling and shortness of breath. Patient reports he has been having worsening shortness of breath for the past 6 months. He had acutely worsening SOB today where he described becoming profoundly dyspneic on exertion. He reported taking 2 of his 80 mg Lasix this morning with subsequent minimal urine output and minimal improvement in his SOB. He reports SOB aggravated with lying down and he requires frequent positional changes to keep comfortable. He reports additional poor appetite and urine output for the past week although he reports he has been drinking well. He had 2 episodes of nausea/vomiting this past week as well. He denies fever/chills, chest pain, abdominal pain, recent illness, medication changes. Patient reports adherence with all medications. He reports he lives with his daughter who helps him with his medications and healthcare.    Additionally he reports chronic leg pain from a chronic RLE wound. He went to wound care yesterday where dressings were changed and he was told they were healing well. He has bilateral lower extremity edema R>L that is typical for him and he denies recent worsening.    Overview/Hospital Course:  Pt admitted to hospital medicine for acute heart failure exacerbation. Continuing diuresis with lasix 80 IV BID. Also complaining of inability to swallow for 3 months duration. SLP evaluated the patient and  determined that he could tolerate liquids. GI has been consulted, planning for EGD. New leukocytosis noted 12/31, work up significant for COVID positive. Started remdesivir, steroids held in order to prevent worsening fluid retention. Overnight 12/31, noted to be tachycardic and hypotensive (asymptomatic). Cardiology called, thought to be AF RVR and po amio transitioned to amio gtt. EP consulted for device interrogation. Planning for EGD tomorrow.    Interval History: NAEO. Still having vomiting when trying to eat solid foods. Also noted b/l upper extremity weakness that has been present for at least a month. Had a 13 beat run of NSVT yesterday and some periods of bradycardia.     Review of Systems   Respiratory:  Negative for cough and shortness of breath.    Cardiovascular:  Negative for chest pain and palpitations.   Gastrointestinal:  Positive for vomiting. Negative for nausea.   Neurological:  Positive for weakness.     Objective:     Vital Signs (Most Recent):  Temp: 97.8 °F (36.6 °C) (01/02/24 1212)  Pulse: 60 (01/02/24 1212)  Resp: 18 (01/02/24 1212)  BP: (!) 99/59 (01/02/24 1212)  SpO2: 98 % (01/02/24 1212) Vital Signs (24h Range):  Temp:  [97.3 °F (36.3 °C)-99 °F (37.2 °C)] 97.8 °F (36.6 °C)  Pulse:  [60-77] 60  Resp:  [18-22] 18  SpO2:  [98 %-100 %] 98 %  BP: ()/(59-78) 99/59     Weight: 111.5 kg (245 lb 13 oz)  Body mass index is 29.15 kg/m².    Intake/Output Summary (Last 24 hours) at 1/2/2024 1234  Last data filed at 1/2/2024 1059  Gross per 24 hour   Intake 80.94 ml   Output 1350 ml   Net -1269.06 ml         Physical Exam  Vitals and nursing note reviewed.   Constitutional:       Appearance: He is ill-appearing.   HENT:      Head: Normocephalic and atraumatic.      Mouth/Throat:      Mouth: Mucous membranes are moist.   Eyes:      General: Scleral icterus present.      Extraocular Movements: Extraocular movements intact.      Comments: Anisocoria   Cardiovascular:      Rate and Rhythm: Normal  rate and regular rhythm.      Heart sounds:      Gallop present.   Pulmonary:      Effort: Pulmonary effort is normal. No respiratory distress.      Breath sounds: Normal breath sounds. No wheezing.   Abdominal:      General: Abdomen is flat. There is no distension.      Palpations: Abdomen is soft.      Tenderness: There is no abdominal tenderness.   Musculoskeletal:         General: Tenderness present.      Right lower leg: Edema present.      Left lower leg: Edema present.      Comments: Wound dressings on RLE and LLE   Skin:     General: Skin is warm and dry.   Neurological:      Mental Status: He is alert and oriented to person, place, and time.      Motor: Weakness (Difficulty with raising arms) present.   Psychiatric:         Mood and Affect: Mood normal.         Behavior: Behavior normal.             Significant Labs: All pertinent labs within the past 24 hours have been reviewed.    Significant Imaging: I have reviewed all pertinent imaging results/findings within the past 24 hours.    Assessment/Plan:      * Acute on chronic combined systolic and diastolic CHF (congestive heart failure)  Patient is identified as having Combined Systolic and Diastolic heart failure that is Acute on chronic. CHF is currently uncontrolled due to Pulmonary edema/pleural effusion on CXR. Latest ECHO performed and demonstrates- Results for orders placed during the hospital encounter of 11/10/23    Echo    Interpretation Summary    Left Ventricle: The left ventricle is severely dilated. Mildly increased wall thickness. There is mild concentric hypertrophy. Severe global hypokinesis present. There is severely reduced systolic function with a visually estimated ejection fraction of 10 -15%. Grade III diastolic dysfunction.    Right Ventricle: Severe right ventricular enlargement. Systolic function is severely reduced.    Mitral Valve: There is no stenosis. There is mild to moderate regurgitation with a centrally directed jet.     Tricuspid Valve: There is mild to moderate regurgitation.    Pulmonary Artery: The estimated pulmonary artery systolic pressure is 67 mmHg.  .Last BNP reviewed- and noted below   Recent Labs   Lab 01/01/24  0551   BNP 2,684*         Presented for acute on chronic SOB with associated low UOP. Afebrile HDS. BNP 3,067, Troponin 0.031. CXR with cardiomegaly, vascular congestion, and edema. Received 100 of Lasix in the ED.    - RIP pending  - IV lasix 80 BID. If urine output suboptimal consider increasing dose or additional diuretics in combination  - continue home Jardiance   - Beta blocker/aldactone held given soft pressures, resume when appropriate  - Cardiac diet with Fluid restriction at 1.5L with strict I/Os and daily STANDING weights  - Check Electrolytes, keep Mag >2 & K+ >4  - SCDs, Ambulate as tolerated    - Strict I&Os, Daily standing weights  - Review Low-salt diet and enforce medication adherence on discharge    COVID-19  COVID positive, noted 12/31  - Last remdesivir treatment today  - Hold steroids at this time, hesitant to exacerbate fluid retention     Microcytic anemia  Iron studies notable for Fe 26 and sat iron 8%. TIBC, ferritin, transferrin wnl. Likely iron deficiency anemia.    - Daily iron tablet  - Daily CBCs    Dysphagia  Reports a 3 month history of inability to tolerate solid foods. SLP has assessed him, can tolerate liquids.     - GI consulted, planning for EGD tomorrow  - Adding boost to meals      Pupil asymmetry  Notable asymetry on pupils by patient. Chart review shows left orbital trauma in 2021 with notes concerned for dilation and vision changes. When talking to daughter, this is chronic. States no vision changes or neuro symptoms whatsoever.     Serum total bilirubin elevated  Direct bili 4.5. No signs of hemolysis.    - Daily CMPs      Nausea and vomiting  Complaints of several day history of N/V on admission. Also noted 3 month history of difficulty swallowing solid foods.    -  Consulted SLP, able to tolerate liquids  - Consulted GI, planning for EGD tomorrow      Acute renal failure superimposed on stage 3a chronic kidney disease  Creatinine 2.1 on admit, baseline around 1.4. Likely prerenal etiology given urine sodium and FENa vs progression of CKD. Given stability of Cr, likely new baseline.   Recent Labs     12/31/23  1804 01/01/24  0551 01/02/24  0406   BUN 61* 66* 65*   CREATININE 2.0* 2.1* 1.9*         Estimated Creatinine Clearance: 51.6 mL/min (A) (based on SCr of 1.9 mg/dL (H)).  Improving    - Renal US: renal cysts, no hydronephrosis  - Urine Na: 14, Pr/Cr: 0.72; FENa 0.2%  - Monitor urine output and serial BMP and adjust therapy as needed.   - Strict I&Os and daily weights   - Avoid nephrotoxic agents such as NSAIDs, gadolinium and IV radiocontrast.  - Renally dose meds to current GFR.  - Maintain MAP > 65.  - Nephrology referral outpatient    AMELIA (obstructive sleep apnea)  Carried diagnosis of AMELIA per chart, however he does not sleep with CPAP at home and declines while here      NICM (nonischemic cardiomyopathy)  ASA 325mg qd per home medication list. Reason for this dose unclear. Will confirm with cardiology    - Will discuss BB with cardiology while patient in on amio gtt      Biventricular ICD (implantable cardioverter-defibrillator) in place  Stable on admission, no acute issues, he denies discharge. QTc chronically prolonged. Continue home amiodarone for NSVT prevention. Monitor on telemetry   -- 12/31, noted to have AF RVR with hypotension. Started on amio gtt. Per report, pacemaker malfunctioning. EP consulted for formal evaluation     Hyperlipidemia  Stable. Continue Home Pravastatin.        Essential hypertension  Chronic, controlled. Latest blood pressure and vitals reviewed-     Temp:  [97.3 °F (36.3 °C)-99 °F (37.2 °C)]   Pulse:  [60-77]   Resp:  [18-22]   BP: ()/(59-78)   SpO2:  [98 %-100 %] .   Home meds for hypertension were reviewed and noted below.    Hypertension Medications               furosemide (LASIX) 80 MG tablet TAKE 1 TABLET EVERY DAY    metoprolol succinate (TOPROL-XL) 50 MG 24 hr tablet TAKE 1 TABLET EVERY DAY    spironolactone (ALDACTONE) 25 MG tablet TAKE 1/2 TABLET (12.5 MG TOTAL) ONCE DAILY. HOLD IF SYSTOLIC BLOOD PRESSURE IS LESS THAN 110            While in the hospital, will manage blood pressure as follows; Adjust home antihypertensive regimen as follows- Holding in setting of borderline pressures in setting of new diuresis     Will utilize p.r.n. blood pressure medication only if patient's blood pressure greater than 180/110 and he develops symptoms such as worsening chest pain or shortness of breath.      VTE Risk Mitigation (From admission, onward)           Ordered     heparin (porcine) injection 5,000 Units  Every 8 hours         12/29/23 1759     IP VTE HIGH RISK PATIENT  Once         12/29/23 1759     Place sequential compression device  Until discontinued         12/29/23 1759                    Discharge Planning   MARIIA: 1/3/2024     Code Status: Full Code   Is the patient medically ready for discharge?: No    Reason for patient still in hospital (select all that apply): Treatment                     Deepa Holly MD  Department of Hospital Medicine   [facility] - Cardiology Stepdown

## 2024-01-02 NOTE — CONSULTS
"[facility] - Cardiology Stepdown  Cardiac Electrophysiology  Consult Note    Admission Date: 12/29/2023  Code Status: Full Code   Attending Provider: Milly Mendoza MD  Consulting Provider: Damian Crowder MD  Principal Problem:Acute on chronic combined systolic and diastolic CHF (congestive heart failure)    Inpatient consult to Electrophysiology  Consult performed by: Damian Ordoñez MD  Consult ordered by: Jayda Jennings DO        Subjective:     Chief Complaint:  Tachycardia and bradycardia during hospitalization     HPI:   67yo M patient with HFrEF non-ischemic CMP (10-15%) on GDMT and s/p [facility] [facility] CRT-D (complicated by device infection 06/20, s/p removal and reimplantation 09/2020), mild-mod mityral valve regurgitation, HTN, HLD, PAD, CKD (Baseline Cr 1.7), AMELIA, who was admitted to [facility] on 12/29/23 with CC of bilateral LE swelling and shortness of breath. SOB has been ongoing for the last months and worsened in the past 2 weeks. He was found to have heart failure exacerbation, RIGOBERTO and COVID-19 and started diuresis with furosemide 80mg bid and remdesivir. He was also started on amiodarone on 12/30/23 for as outpatient for "VT prevention". Patient was improving with a total output of -2.237L, but overnight on 12/31/23 she had an episode of arrhythmia at 1730h after given remdesivir. had an episode of suspected Afib. Loaded with amiodarone and EP consulted.      Labs remarkable for Hb 13.4, Cr 2.1 (since admission 2.1-2.4), BNP 3067 and 2684, troponin of 0.036-0.044-0.058-0.047, and lactate of 3-2.3.     Past Medical History:   Diagnosis Date    RIGOBERTO (acute kidney injury) 10/7/2020    Anticoagulant long-term use     Aspirin    CHF (congestive heart failure)     Hyperlipidemia     Hypertension     Microcytic anemia 12/31/2023    NICM (nonischemic cardiomyopathy) 6/16/2020    Obesity     AMELIA (obstructive sleep apnea) 1/7/2022    Peripheral vascular disease, unspecified 2/1/2021 "       Past Surgical History:   Procedure Laterality Date    INSERTION OF BIVENTRICULAR IMPLANTABLE CARDIOVERTER-DEFIBRILLATOR (ICD) N/A 02/13/2019    Procedure: INSERTION, ICD, BIVENTRICULAR;  Surgeon: Shailesh Eng MD;  Location: [facility] CATH LAB;  Service: Cardiology;  Laterality: N/A;  RN PRE OP 2-6-19  1ST CASE PER  RADHA. NOTIFIED RADHA THAT ANESTHESIA IS NOT PITO FOR 1ST CASE START-LO    INSERTION OF BIVENTRICULAR IMPLANTABLE CARDIOVERTER-DEFIBRILLATOR (ICD) N/A 09/28/2020    Procedure: INSERTION, ICD, BIVENTRICULAR;  Surgeon: Jim Kwong MD;  Location: [facility] EP LAB;  Service: Cardiology;  Laterality: N/A;  NICM, CRT-D, SJM,, MAC, DM, 3 Prep*Wearing LifeVest*    oral extraction  11/2018    TESTICLE SURGERY         Review of patient's allergies indicates:   Allergen Reactions    Ramipril      Leg swelling and gynecomastia     Losartan Rash       No current facility-administered medications on file prior to encounter.     Current Outpatient Medications on File Prior to Encounter   Medication Sig    amiodarone (PACERONE) 200 MG Tab Take 1 tablet (200 mg total) by mouth once daily.    aspirin (ECOTRIN) 325 MG EC tablet Take 1 tablet (325 mg total) by mouth once daily.    empagliflozin (JARDIANCE) 10 mg tablet Take 1 tablet (10 mg total) by mouth once daily.    furosemide (LASIX) 80 MG tablet TAKE 1 TABLET EVERY DAY    metoprolol succinate (TOPROL-XL) 50 MG 24 hr tablet TAKE 1 TABLET EVERY DAY    potassium chloride (K-TAB) 20 mEq TAKE 1 TABLET EVERY DAY    pravastatin (PRAVACHOL) 80 MG tablet TAKE 1 TABLET EVERY DAY    spironolactone (ALDACTONE) 25 MG tablet TAKE 1/2 TABLET (12.5 MG TOTAL) ONCE DAILY. HOLD IF SYSTOLIC BLOOD PRESSURE IS LESS THAN 110    acetaminophen (TYLENOL) 500 MG tablet Take 2 tablets (1,000 mg total) by mouth every 8 (eight) hours as needed for Pain or Temperature greater than (100.5).    [DISCONTINUED] ramipril (ALTACE) 10 MG capsule Take 1 capsule (10 mg total) by mouth once daily.      Family History       Problem Relation (Age of Onset)    Epilepsy Mother          Tobacco Use    Smoking status: Former     Current packs/day: 0.00     Average packs/day: 0.5 packs/day for 40.0 years (20.0 ttl pk-yrs)     Types: Cigarettes, Cigars     Start date: 1974     Quit date: 2014     Years since quitting: 10.0     Passive exposure: Current    Smokeless tobacco: Never   Substance and Sexual Activity    Alcohol use: Yes     Comment: once a month beer or liquor    Drug use: No    Sexual activity: Not Currently     Birth control/protection: None     Comment: uses protection sometimes     Review of Systems   Constitutional: Negative.   HENT: Negative.     Cardiovascular:  Negative for chest pain, claudication, cyanosis, dyspnea on exertion, irregular heartbeat, leg swelling, near-syncope, orthopnea, palpitations and paroxysmal nocturnal dyspnea.   Respiratory: Negative.     Endocrine: Negative.    Musculoskeletal: Negative.    Gastrointestinal: Negative.    Genitourinary: Negative.    Neurological: Negative.      Objective:     Vital Signs (Most Recent):  Temp: 97.3 °F (36.3 °C) (01/01/24 1509)  Pulse: 70 (01/01/24 1512)  Resp: (!) 22 (01/01/24 1509)  BP: 94/68 (01/01/24 1509)  SpO2: 98 % (01/01/24 1509) Vital Signs (24h Range):  Temp:  [97.3 °F (36.3 °C)-97.6 °F (36.4 °C)] 97.3 °F (36.3 °C)  Pulse:  [] 70  Resp:  [17-22] 22  SpO2:  [93 %-100 %] 98 %  BP: ()/(59-84) 94/68       Weight: 108.7 kg (239 lb 10.2 oz)  Body mass index is 28.42 kg/m².    SpO2: 98 %        Physical Exam  Vitals and nursing note reviewed.   Constitutional:       General: He is not in acute distress.     Appearance: Normal appearance. He is normal weight. He is not ill-appearing or diaphoretic.   HENT:      Head: Normocephalic and atraumatic.   Eyes:      Pupils: Pupils are equal, round, and reactive to light.   Cardiovascular:      Rate and Rhythm: Normal rate and regular rhythm.      Pulses: Normal pulses.      Heart  sounds: Normal heart sounds.   Pulmonary:      Effort: Pulmonary effort is normal.      Breath sounds: Normal breath sounds.   Abdominal:      General: Abdomen is flat. Bowel sounds are normal. There is no distension.      Palpations: Abdomen is soft. There is no mass.      Tenderness: There is no abdominal tenderness.   Musculoskeletal:         General: Normal range of motion.   Skin:     General: Skin is warm.      Capillary Refill: Capillary refill takes less than 2 seconds.   Neurological:      General: No focal deficit present.      Mental Status: He is alert and oriented to person, place, and time.          Significant Labs:     Recent Labs   Lab 12/30/23  0635 12/31/23  0443 01/01/24  0551   WBC 11.25 14.95* 11.02   HGB 13.8* 13.5* 13.4*   HCT 42.3 40.1 39.5*   * 135* 117*       Recent Labs   Lab 12/30/23  0635 12/30/23  1415 12/31/23  0443 12/31/23  1804 01/01/24  0551   *   < > 133* 138 138   K 5.0   < > 4.0 3.8 3.9   CL 95   < > 98 100 98   CO2 16*   < > 24 24 24   BUN 59*   < > 69* 61* 66*   CREATININE 2.4*   < > 2.4* 2.0* 2.1*   CALCIUM 10.7*   < > 9.8 9.4 9.6   PHOS 5.4*  --  3.5  --  3.5    < > = values in this interval not displayed.       Recent Labs   Lab 12/30/23  1415 12/31/23 0443 01/01/24  0551   ALKPHOS 93 93 94   BILITOT 6.7* 5.8* 5.2*   BILIDIR 4.5*  --   --    PROT 6.7 6.6 6.8   ALT 32 37 40   AST 48* 53* 53*     Recent Labs   Lab 12/31/23  1804 12/31/23  2344 01/01/24  0551 01/01/24  0828 01/01/24  1129   CPK 38  --   --   --   --    TROPONINI 0.042*   < > 0.058* 0.060* 0.047*    < > = values in this interval not displayed.         Significant Imaging:     Cardiac Device check 12/12/23  Presenting EGM As/Ap - BVp. Battery status is OK. Measured values in normal range. No new events since last transmission. 0% AT/AF Clarence    EKG 12/29/23  Atrial-sensed ventricular-paced rhythm     EKG 12/06/23  Dual Pacer     TTE 08/18/23    Left Ventricle: The left ventricle is severely  dilated. Increased ventricular mass. Normal wall thickness. Global hypokinesis present. There is severely reduced systolic function with a visually estimated ejection fraction of 15 - 20%. There is diastolic dysfunction.    Left Atrium: Left atrium is severely dilated.    Right Ventricle: Severe right ventricular enlargement. Wall thickness is normal. Systolic function is moderately reduced. Pacemaker lead present in the ventricle.    Right Atrium: Right atrium is dilated.    Mitral Valve: Mild mitral annular calcification. There is moderate regurgitation.    Tricuspid Valve: There is mild regurgitation.    Pulmonic Valve: There is mild regurgitation.    Pulmonary Artery: The estimated pulmonary artery systolic pressure is 59 mmHg.    IVC/SVC: Elevated venous pressure at 15 mmHg.    CXR 12/31/23  - Lines and tubes: Right chest wall biventricular AICD.  - Heart and mediastinum: Enlarged.  - Pleura: Trace right pleural effusion.  No pneumothorax.  - Lungs: Lungs are well inflated. Interstitial pulmonary edema, similar to prior.  No new focal consolidation.  - Soft tissue/bone: Unchanged.    Telemetry     12/31/23 - 17:21h      01/01/24 - 1151h        01/01/24 - 1148h                  Assessment and Plan:     Biventricular ICD (implantable cardioverter-defibrillator) in place  - Follows with Dr. Kwong as outpatient  - Is on amiodarone to prevent episodes of VT  - Inpatient  he has had episodes of NSVT  - During episodes of bradycardia, patient is correctly paced  - Episode of tachycardia likely due to intrinsic rhythm/PVCs  - No significant atrioventricular rhtyhm noted  - 0% AT/AF burden  - No evidence of bradycardia as patient has PPM of 50 with appropriate capture    - Formal interrogation in the am        Thank you for your consult.     Damian Crowder MD  Cardiac Electrophysiology  [facility] - Cardiology Stepdown

## 2024-01-02 NOTE — TELEPHONE ENCOUNTER
Spoke to patient's daughter about wound care. Instructed patient's daughter I do not have privileges inpatient. The inpatient team will need to consult inpatient wound care for a dressing change. She voiced understanding.      Deborah Nicole PA-C

## 2024-01-02 NOTE — ASSESSMENT & PLAN NOTE
Chronic, controlled. Latest blood pressure and vitals reviewed-     Temp:  [97.3 °F (36.3 °C)-99 °F (37.2 °C)]   Pulse:  [60-77]   Resp:  [18-22]   BP: ()/(59-78)   SpO2:  [98 %-100 %] .   Home meds for hypertension were reviewed and noted below.   Hypertension Medications               furosemide (LASIX) 80 MG tablet TAKE 1 TABLET EVERY DAY    metoprolol succinate (TOPROL-XL) 50 MG 24 hr tablet TAKE 1 TABLET EVERY DAY    spironolactone (ALDACTONE) 25 MG tablet TAKE 1/2 TABLET (12.5 MG TOTAL) ONCE DAILY. HOLD IF SYSTOLIC BLOOD PRESSURE IS LESS THAN 110            While in the hospital, will manage blood pressure as follows; Adjust home antihypertensive regimen as follows- Holding in setting of borderline pressures in setting of new diuresis     Will utilize p.r.n. blood pressure medication only if patient's blood pressure greater than 180/110 and he develops symptoms such as worsening chest pain or shortness of breath.

## 2024-01-02 NOTE — HPI
"69yo M patient with HFrEF non-ischemic CMP (10-15%) on GDMT and s/p [facility] [facility] CRT-D (complicated by device infection 06/20, s/p removal and reimplantation 09/2020), mild-mod mityral valve regurgitation, HTN, HLD, PAD, CKD (Baseline Cr 1.7), AMELIA, who was admitted to [facility] on 12/29/23 with CC of bilateral LE swelling and shortness of breath. SOB has been ongoing for the last months and worsened in the past 2 weeks. He was found to have heart failure exacerbation, RIGOBERTO and COVID-19 and started diuresis with furosemide 80mg bid and remdesivir. He was also started on amiodarone on 12/30/23 for as outpatient for "VT prevention". Patient was improving with a total output of -2.237L, but overnight on 12/31/23 she had an episode of arrhythmia at 1730h after given remdesivir. had an episode of suspected Afib. Loaded with amiodarone and EP consulted.      Labs remarkable for Hb 13.4, Cr 2.1 (since admission 2.1-2.4), BNP 3067 and 2684, troponin of 0.036-0.044-0.058-0.047, and lactate of 3-2.3.   "

## 2024-01-02 NOTE — ASSESSMENT & PLAN NOTE
Complaints of several day history of N/V on admission. Also noted 3 month history of difficulty swallowing solid foods.    - Consulted SLP, able to tolerate liquids  - Consulted GI, planning for EGD tomorrow

## 2024-01-02 NOTE — ASSESSMENT & PLAN NOTE
Iron studies notable for Fe 26 and sat iron 8%. TIBC, ferritin, transferrin wnl. Likely iron deficiency anemia.    - Daily iron tablet  - Daily CBCs

## 2024-01-02 NOTE — SUBJECTIVE & OBJECTIVE
Past Medical History:   Diagnosis Date    RIGOBERTO (acute kidney injury) 10/7/2020    Anticoagulant long-term use     Aspirin    CHF (congestive heart failure)     Hyperlipidemia     Hypertension     Microcytic anemia 12/31/2023    NICM (nonischemic cardiomyopathy) 6/16/2020    Obesity     AMELIA (obstructive sleep apnea) 1/7/2022    Peripheral vascular disease, unspecified 2/1/2021       Past Surgical History:   Procedure Laterality Date    INSERTION OF BIVENTRICULAR IMPLANTABLE CARDIOVERTER-DEFIBRILLATOR (ICD) N/A 02/13/2019    Procedure: INSERTION, ICD, BIVENTRICULAR;  Surgeon: Shailesh Eng MD;  Location: [facility] CATH LAB;  Service: Cardiology;  Laterality: N/A;  RN PRE OP 2-6-19  1ST CASE PER  RADHA. NOTIFIED RADHA THAT ANESTHESIA IS NOT PITO FOR 1ST CASE START-LO    INSERTION OF BIVENTRICULAR IMPLANTABLE CARDIOVERTER-DEFIBRILLATOR (ICD) N/A 09/28/2020    Procedure: INSERTION, ICD, BIVENTRICULAR;  Surgeon: Jim Kwong MD;  Location: [facility] EP LAB;  Service: Cardiology;  Laterality: N/A;  NICM, CRT-D, SJM,, MAC, DM, 3 Prep*Wearing LifeVest*    oral extraction  11/2018    TESTICLE SURGERY         Review of patient's allergies indicates:   Allergen Reactions    Ramipril      Leg swelling and gynecomastia     Losartan Rash       No current facility-administered medications on file prior to encounter.     Current Outpatient Medications on File Prior to Encounter   Medication Sig    amiodarone (PACERONE) 200 MG Tab Take 1 tablet (200 mg total) by mouth once daily.    aspirin (ECOTRIN) 325 MG EC tablet Take 1 tablet (325 mg total) by mouth once daily.    empagliflozin (JARDIANCE) 10 mg tablet Take 1 tablet (10 mg total) by mouth once daily.    furosemide (LASIX) 80 MG tablet TAKE 1 TABLET EVERY DAY    metoprolol succinate (TOPROL-XL) 50 MG 24 hr tablet TAKE 1 TABLET EVERY DAY    potassium chloride (K-TAB) 20 mEq TAKE 1 TABLET EVERY DAY    pravastatin (PRAVACHOL) 80 MG tablet TAKE 1 TABLET EVERY DAY    spironolactone  (ALDACTONE) 25 MG tablet TAKE 1/2 TABLET (12.5 MG TOTAL) ONCE DAILY. HOLD IF SYSTOLIC BLOOD PRESSURE IS LESS THAN 110    acetaminophen (TYLENOL) 500 MG tablet Take 2 tablets (1,000 mg total) by mouth every 8 (eight) hours as needed for Pain or Temperature greater than (100.5).    [DISCONTINUED] ramipril (ALTACE) 10 MG capsule Take 1 capsule (10 mg total) by mouth once daily.     Family History       Problem Relation (Age of Onset)    Epilepsy Mother          Tobacco Use    Smoking status: Former     Current packs/day: 0.00     Average packs/day: 0.5 packs/day for 40.0 years (20.0 ttl pk-yrs)     Types: Cigarettes, Cigars     Start date: 1974     Quit date: 2014     Years since quitting: 10.0     Passive exposure: Current    Smokeless tobacco: Never   Substance and Sexual Activity    Alcohol use: Yes     Comment: once a month beer or liquor    Drug use: No    Sexual activity: Not Currently     Birth control/protection: None     Comment: uses protection sometimes     Review of Systems   Constitutional: Negative.   HENT: Negative.     Cardiovascular:  Negative for chest pain, claudication, cyanosis, dyspnea on exertion, irregular heartbeat, leg swelling, near-syncope, orthopnea, palpitations and paroxysmal nocturnal dyspnea.   Respiratory: Negative.     Endocrine: Negative.    Musculoskeletal: Negative.    Gastrointestinal: Negative.    Genitourinary: Negative.    Neurological: Negative.      Objective:     Vital Signs (Most Recent):  Temp: 97.3 °F (36.3 °C) (01/01/24 1509)  Pulse: 70 (01/01/24 1512)  Resp: (!) 22 (01/01/24 1509)  BP: 94/68 (01/01/24 1509)  SpO2: 98 % (01/01/24 1509) Vital Signs (24h Range):  Temp:  [97.3 °F (36.3 °C)-97.6 °F (36.4 °C)] 97.3 °F (36.3 °C)  Pulse:  [] 70  Resp:  [17-22] 22  SpO2:  [93 %-100 %] 98 %  BP: ()/(59-84) 94/68       Weight: 108.7 kg (239 lb 10.2 oz)  Body mass index is 28.42 kg/m².    SpO2: 98 %        Physical Exam  Vitals and nursing note reviewed.    Constitutional:       General: He is not in acute distress.     Appearance: Normal appearance. He is normal weight. He is not ill-appearing or diaphoretic.   HENT:      Head: Normocephalic and atraumatic.   Eyes:      Pupils: Pupils are equal, round, and reactive to light.   Cardiovascular:      Rate and Rhythm: Normal rate and regular rhythm.      Pulses: Normal pulses.      Heart sounds: Normal heart sounds.   Pulmonary:      Effort: Pulmonary effort is normal.      Breath sounds: Normal breath sounds.   Abdominal:      General: Abdomen is flat. Bowel sounds are normal. There is no distension.      Palpations: Abdomen is soft. There is no mass.      Tenderness: There is no abdominal tenderness.   Musculoskeletal:         General: Normal range of motion.   Skin:     General: Skin is warm.      Capillary Refill: Capillary refill takes less than 2 seconds.   Neurological:      General: No focal deficit present.      Mental Status: He is alert and oriented to person, place, and time.          Significant Labs:     Recent Labs   Lab 12/30/23  0635 12/31/23 0443 01/01/24  0551   WBC 11.25 14.95* 11.02   HGB 13.8* 13.5* 13.4*   HCT 42.3 40.1 39.5*   * 135* 117*       Recent Labs   Lab 12/30/23  0635 12/30/23  1415 12/31/23 0443 12/31/23 1804 01/01/24  0551   *   < > 133* 138 138   K 5.0   < > 4.0 3.8 3.9   CL 95   < > 98 100 98   CO2 16*   < > 24 24 24   BUN 59*   < > 69* 61* 66*   CREATININE 2.4*   < > 2.4* 2.0* 2.1*   CALCIUM 10.7*   < > 9.8 9.4 9.6   PHOS 5.4*  --  3.5  --  3.5    < > = values in this interval not displayed.       Recent Labs   Lab 12/30/23  1415 12/31/23 0443 01/01/24  0551   ALKPHOS 93 93 94   BILITOT 6.7* 5.8* 5.2*   BILIDIR 4.5*  --   --    PROT 6.7 6.6 6.8   ALT 32 37 40   AST 48* 53* 53*     Recent Labs   Lab 12/31/23 1804 12/31/23  2344 01/01/24  0551 01/01/24  0828 01/01/24  1129   CPK 38  --   --   --   --    TROPONINI 0.042*   < > 0.058* 0.060* 0.047*    < > = values in  this interval not displayed.         Significant Imaging:     Cardiac Device check 12/12/23  Presenting EGM As/Ap - BVp. Battery status is OK. Measured values in normal range. No new events since last transmission. 0% AT/AF [place]    EKG 12/29/23  Atrial-sensed ventricular-paced rhythm     EKG 12/06/23  Dual Pacer     TTE 08/18/23    Left Ventricle: The left ventricle is severely dilated. Increased ventricular mass. Normal wall thickness. Global hypokinesis present. There is severely reduced systolic function with a visually estimated ejection fraction of 15 - 20%. There is diastolic dysfunction.    Left Atrium: Left atrium is severely dilated.    Right Ventricle: Severe right ventricular enlargement. Wall thickness is normal. Systolic function is moderately reduced. Pacemaker lead present in the ventricle.    Right Atrium: Right atrium is dilated.    Mitral Valve: Mild mitral annular calcification. There is moderate regurgitation.    Tricuspid Valve: There is mild regurgitation.    Pulmonic Valve: There is mild regurgitation.    Pulmonary Artery: The estimated pulmonary artery systolic pressure is 59 mmHg.    IVC/SVC: Elevated venous pressure at 15 mmHg.    CXR 12/31/23  - Lines and tubes: Right chest wall biventricular AICD.  - Heart and mediastinum: Enlarged.  - Pleura: Trace right pleural effusion.  No pneumothorax.  - Lungs: Lungs are well inflated. Interstitial pulmonary edema, similar to prior.  No new focal consolidation.  - Soft tissue/bone: Unchanged.    Telemetry     12/31/23 - 17:21h      01/01/24 - 1151h        01/01/24 - 1148h

## 2024-01-02 NOTE — PLAN OF CARE
[facility] - Cardiology Stepdown  Initial Discharge Assessment       Primary Care Provider: Brynn To MD    Admission Diagnosis: Shortness of breath [R06.02]  Syncope [R55]  Chest pain [R07.9]  Congestive heart failure, unspecified HF chronicity, unspecified heart failure type [I50.9]    Admission Date: 12/29/2023  Expected Discharge Date: 1/3/2024    Transition of Care Barriers: None    Payor: HU[employer] MANAGED MEDICARE / Plan: HU[employer] MEDICARE HMO / Product Type: Capitation /     Extended Emergency Contact Information  Primary Emergency Contact: Annie Bhakta  Address: [address] [place] [address] [facility]  Home Phone: [phone]  Mobile Phone: [phone]  Relation: Daughter    Discharge Plan A: Home with family  Discharge Plan B: Home with family, [facility] - [facility] [address] [facility]  [address] [facility] [address]  Phone: [phone] Fax: [phone]    [facility] [address] [place] [address]  Phone: [phone] Fax: [phone]    [facility] [place] - [address]  [address]  [place] [address]  Phone: [phone] Fax: [phone]    [id number] [address] AT [facility] OF [address] [address]  Phone: [phone] Fax: [phone]    [id number] - [place]  [address]  AT [facility] [address]  [address] [place] [address]  Phone: [phone] Fax: [phone]    [id number] - [place] - [address] AT [facility] [address]  [address]  [place] [address]  Phone: [phone] Fax: [phone]      Initial Assessment (most recent)       Adult Discharge Assessment - 01/02/24 1336          Discharge Assessment    Assessment Type Discharge Planning Assessment     Source of Information patient;family      Communicated MARIIA with patient/caregiver Date not available/Unable to determine     Reason For Admission Acute on chronic systolic and diastolic CHF     People in Home child(sourav), adult;grandchild(sourav)     Facility Arrived From: home     Do you expect to return to your current living situation? Yes     Do you have help at home or someone to help you manage your care at home? Yes     Who are your caregiver(s) and their phone number(s)? Annie Bhakta (daughter) [phone]  son at bedside and also gave us his phone number [phone]     Prior to hospitilization cognitive status: Alert/Oriented     Current cognitive status: Alert/Oriented     Equipment Currently Used at Home rollator;other (see comments)   walker with extended area for arms.    Readmission within 30 days? No     Patient currently being followed by outpatient case management? No     Do you currently have service(s) that help you manage your care at home? No   declined home health last admit    Do you take prescription medications? Yes     Do you have prescription coverage? Yes     Coverage [employer] Medicare     Do you have any problems affording any of your prescribed medications? No     Is the patient taking medications as prescribed? yes     Who is going to help you get home at discharge? Annie Bhakta (daughter) [phone]     How do you get to doctors appointments? family or friend will provide     Are you on dialysis? No     Do you take coumadin? No     Discharge Plan A Home with family     Discharge Plan B Home with family;Home Health     DME Needed Upon Discharge  none     Discharge Plan discussed with: Adult children;Patient     Transition of Care Barriers None        OTHER    Name(s) of People in Home Annie Bhakta (daughter) [phone] and minor child                        CM met with the patient and son Papito ([phone])  at the bedside and discussed the discharge plan. Gave him the discharge booklet and placed contact numbers  on the white board in the room. Patient alert and sitting up on side of bed.  Patient verified his name , , PCP, Insurance and Pharmacy . Stated he lives with Annie Bhakta (daughter) [phone] and grandchild a single story house and has just the threshold to point of entry . He is/is not on coumadin nor is he a dialysis patient . DME's include:walker with the arm extension an roll ator.  Stated he takes  medication as prescribed and has no problems getting  medication. Patient interested in bed side delivery.    Discharge Plan A and Plan B have been determined by review of patient's clinical status, future medical and therapeutic needs, and coverage/benefits for post-acute care in coordination with multidisciplinary team members.    Epifanio Licona RN         [phone]

## 2024-01-02 NOTE — ASSESSMENT & PLAN NOTE
- Follows with Dr. Kwong as outpatient  - Is on amiodarone to prevent episodes of VT  - Inpatient  he has had episodes of NSVT  - During episodes of bradycardia, patient is correctly paced  - Episode of tachycardia likely due to intrinsic rhythm/PVCs  - No significant atrioventricular rhtyhm noted  - 0% AT/AF burden  - No evidence of bradycardia as patient has PPM of 50 with appropriate capture    - Formal interrogation in the am

## 2024-01-02 NOTE — PT/OT/SLP PROGRESS
Speech Language Pathology Treatment    Patient Name:  Papito Bhakta   MRN:  [id number]  Admitting Diagnosis: Acute on chronic combined systolic and diastolic CHF (congestive heart failure)    Recommendations:                 General Recommendations:  Dysphagia therapy  Diet recommendations: Pt safe for least restrictive diet  as tolerated but seems to be tolerating liquid diet without episodes of emesis at this time. Liquid Diet Level: Thin liquids - IDDSI Level 0   Aspiration Precautions: Standard aspiration precautions   General Precautions: Standard, fall  Communication strategies:  none    Assessment:     Papito Bhakta is a 68 y.o. male with an SLP diagnosis of Dysphagia.      Subjective     Awake/alert    Pain/Comfort:  Pain Rating 1: 0/10  Pain Rating Post-Intervention 1: 0/10    Respiratory Status: Room air    Objective:     Has the patient been evaluated by SLP for swallowing?   Yes  Keep patient NPO? No     Pt upright at EOB upon entry. He denied any difficulty tolerating liquid diet at this time. SLP educated pt on need for ongoing swallow assessment of purees and solids to assess swallow function. Pt tolerated bite of puree x1 and cracker x1 with no overt s/s of airway compromise; however pt with episode of emesis shortly after. Recommend pt tolerate least restrictive diet he can, no concerns noted from an oropharyngeal perspective. GI onboard. SLP reviewed recs and finding with team via secure chat.     Goals:   Multidisciplinary Problems       SLP Goals          Problem: SLP    Goal Priority Disciplines Outcome   SLP Goal     SLP    Description: Speech Language Pathology Goals  Goals expected to be met by 1/6 1. Pt will participate in ongoing swallow assessment                               Plan:     Patient to be seen:  3 x/week   Plan of Care reviewed with:  patient   SLP Follow-Up:  Yes       Discharge recommendations:    likely no ST       Time Tracking:     SLP Treatment Date:   01/02/24  Speech  Start Time:  1055  Speech Stop Time:  1102     Speech Total Time (min):  7 min    Billable Minutes: Treatment Swallowing Dysfunction 7    01/02/2024

## 2024-01-02 NOTE — PROGRESS NOTES
[facility] - Cardiology Stepdown  Cardiac Electrophysiology  Progress Note    Admission Date: 12/29/2023  Code Status: Full Code   Attending Physician: Milly Mendoza MD   Expected Discharge Date: 1/3/2024  Principal Problem:Acute on chronic combined systolic and diastolic CHF (congestive heart failure)    Subjective:     Interval History: Telemetry overnight with 1 episode of NSVT. Appears to be V paced.      Review of Systems   Constitutional: Negative for diaphoresis and fever.   Cardiovascular:  Negative for chest pain, dyspnea on exertion, leg swelling, near-syncope, orthopnea, palpitations, paroxysmal nocturnal dyspnea and syncope.   Respiratory:  Negative for cough and shortness of breath.    Gastrointestinal:  Negative for abdominal pain, diarrhea, nausea and vomiting.   Neurological:  Negative for light-headedness.   Psychiatric/Behavioral:  Negative for altered mental status and substance abuse.      Objective:     Vital Signs (Most Recent):  Temp: 99 °F (37.2 °C) (01/02/24 0815)  Pulse: 68 (01/02/24 1108)  Resp: 20 (01/02/24 0815)  BP: 97/62 (01/02/24 1025)  SpO2: 100 % (01/02/24 0815) Vital Signs (24h Range):  Temp:  [97.3 °F (36.3 °C)-99 °F (37.2 °C)] 99 °F (37.2 °C)  Pulse:  [65-77] 68  Resp:  [18-22] 20  SpO2:  [98 %-100 %] 100 %  BP: ()/(59-78) 97/62     Weight: 111.5 kg (245 lb 13 oz)  Body mass index is 29.15 kg/m².     SpO2: 100 %        Physical Exam  Constitutional:       General: He is not in acute distress.     Appearance: Normal appearance. He is well-developed.   HENT:      Mouth/Throat:      Mouth: Mucous membranes are moist.      Pharynx: Oropharynx is clear.   Cardiovascular:      Rate and Rhythm: Normal rate and regular rhythm.      Heart sounds: Normal heart sounds. No murmur heard.  Pulmonary:      Effort: Pulmonary effort is normal. No respiratory distress.      Breath sounds: Normal breath sounds.   Abdominal:      Palpations: Abdomen is soft.      Tenderness: There is no  abdominal tenderness. There is no guarding.   Musculoskeletal:         General: No swelling. Normal range of motion.      Right lower leg: No edema.      Left lower leg: No edema.   Skin:     General: Skin is warm and dry.   Neurological:      Mental Status: He is alert and oriented to person, place, and time.   Psychiatric:         Mood and Affect: Mood normal.         Behavior: Behavior normal.            Significant Labs: All pertinent lab results from the last 24 hours have been reviewed.    Significant Imaging:  Reviewed    Cardiac Device check 12/12/23  Presenting EGM As/Ap - BVp. Battery status is OK. Measured values in normal range. No new events since last transmission. 0% AT/AF [place]     EKG 12/29/23  Atrial-sensed ventricular-paced rhythm      EKG 12/06/23  Dual Pacer      TTE 08/18/23    Left Ventricle: The left ventricle is severely dilated. Increased ventricular mass. Normal wall thickness. Global hypokinesis present. There is severely reduced systolic function with a visually estimated ejection fraction of 15 - 20%. There is diastolic dysfunction.    Left Atrium: Left atrium is severely dilated.    Right Ventricle: Severe right ventricular enlargement. Wall thickness is normal. Systolic function is moderately reduced. Pacemaker lead present in the ventricle.    Right Atrium: Right atrium is dilated.    Mitral Valve: Mild mitral annular calcification. There is moderate regurgitation.    Tricuspid Valve: There is mild regurgitation.    Pulmonic Valve: There is mild regurgitation.    Pulmonary Artery: The estimated pulmonary artery systolic pressure is 59 mmHg.    IVC/SVC: Elevated venous pressure at 15 mmHg.     CXR 12/31/23  - Lines and tubes: Right chest wall biventricular AICD.  - Heart and mediastinum: Enlarged.  - Pleura: Trace right pleural effusion.  No pneumothorax.  - Lungs: Lungs are well inflated. Interstitial pulmonary edema, similar to prior.  No new focal consolidation.  - Soft  tissue/bone: Unchanged.  Assessment and Plan:     Biventricular ICD (implantable cardioverter-defibrillator) in place  - Follows with Dr. Kwong as outpatient  - Is on amiodarone to prevent episodes of VT  - Inpatient  he has had episodes of NSVT  - During episodes of bradycardia, patient is correctly paced  - Episode of tachycardia likely due to intrinsic rhythm/PVCs  - No significant atrioventricular rhtyhm noted  - 0% AT/AF burden  - No evidence of bradycardia as patient has PPM of 50 with appropriate capture    Recommendations:  - Further recommendations pending formal device interrogation (ordered)        Connor M Gillies, MD  Cardiac Electrophysiology  [facility] - Cardiology Stepdown

## 2024-01-02 NOTE — ASSESSMENT & PLAN NOTE
Creatinine 2.1 on admit, baseline around 1.4. Likely prerenal etiology given urine sodium and FENa vs progression of CKD. Given stability of Cr, likely new baseline.   Recent Labs     12/31/23  1804 01/01/24  0551 01/02/24  0406   BUN 61* 66* 65*   CREATININE 2.0* 2.1* 1.9*         Estimated Creatinine Clearance: 51.6 mL/min (A) (based on SCr of 1.9 mg/dL (H)).  Improving    - Renal US: renal cysts, no hydronephrosis  - Urine Na: 14, Pr/Cr: 0.72; FENa 0.2%  - Monitor urine output and serial BMP and adjust therapy as needed.   - Strict I&Os and daily weights   - Avoid nephrotoxic agents such as NSAIDs, gadolinium and IV radiocontrast.  - Renally dose meds to current GFR.  - Maintain MAP > 65.  - Nephrology referral outpatient

## 2024-01-02 NOTE — ASSESSMENT & PLAN NOTE
- Follows with Dr. Kwong as outpatient  - Is on amiodarone to prevent episodes of VT  - Inpatient  he has had episodes of NSVT  - During episodes of bradycardia, patient is correctly paced  - Episode of tachycardia likely due to intrinsic rhythm/PVCs  - No significant atrioventricular rhtyhm noted  - 0% AT/AF burden  - No evidence of bradycardia as patient has PPM of 50 with appropriate capture    Recommendations:  - Further recommendations pending formal device interrogation (ordered)

## 2024-01-03 ENCOUNTER — TELEPHONE (OUTPATIENT)
Dept: ENDOSCOPY | Facility: HOSPITAL | Age: 69
End: 2024-01-03
Payer: MEDICARE

## 2024-01-03 LAB
ALBUMIN SERPL BCP-MCNC: 2.6 G/DL (ref 3.5–5.2)
ALP SERPL-CCNC: 96 U/L (ref 55–135)
ALT SERPL W/O P-5'-P-CCNC: 41 U/L (ref 10–44)
ANION GAP SERPL CALC-SCNC: 17 MMOL/L (ref 8–16)
AST SERPL-CCNC: 39 U/L (ref 10–40)
BILIRUB SERPL-MCNC: 5.3 MG/DL (ref 0.1–1)
BUN SERPL-MCNC: 68 MG/DL (ref 8–23)
CALCIUM SERPL-MCNC: 9.7 MG/DL (ref 8.7–10.5)
CHLORIDE SERPL-SCNC: 97 MMOL/L (ref 95–110)
CO2 SERPL-SCNC: 23 MMOL/L (ref 23–29)
CREAT SERPL-MCNC: 1.9 MG/DL (ref 0.5–1.4)
ERYTHROCYTE [DISTWIDTH] IN BLOOD BY AUTOMATED COUNT: 22 % (ref 11.5–14.5)
EST. GFR  (NO RACE VARIABLE): 37.9 ML/MIN/1.73 M^2
GLUCOSE SERPL-MCNC: 113 MG/DL (ref 70–110)
HCT VFR BLD AUTO: 40.9 % (ref 40–54)
HGB BLD-MCNC: 14.1 G/DL (ref 14–18)
MAGNESIUM SERPL-MCNC: 2.2 MG/DL (ref 1.6–2.6)
MCH RBC QN AUTO: 23.1 PG (ref 27–31)
MCHC RBC AUTO-ENTMCNC: 34.5 G/DL (ref 32–36)
MCV RBC AUTO: 67 FL (ref 82–98)
PHOSPHATE SERPL-MCNC: 3.6 MG/DL (ref 2.7–4.5)
PLATELET # BLD AUTO: 88 K/UL (ref 150–450)
PMV BLD AUTO: ABNORMAL FL (ref 9.2–12.9)
POCT GLUCOSE: 119 MG/DL (ref 70–110)
POTASSIUM SERPL-SCNC: 3.7 MMOL/L (ref 3.5–5.1)
PROT SERPL-MCNC: 6.9 G/DL (ref 6–8.4)
RBC # BLD AUTO: 6.11 M/UL (ref 4.6–6.2)
SODIUM SERPL-SCNC: 137 MMOL/L (ref 136–145)
WBC # BLD AUTO: 9.63 K/UL (ref 3.9–12.7)

## 2024-01-03 PROCEDURE — 63600175 PHARM REV CODE 636 W HCPCS: Performed by: STUDENT IN AN ORGANIZED HEALTH CARE EDUCATION/TRAINING PROGRAM

## 2024-01-03 PROCEDURE — 84100 ASSAY OF PHOSPHORUS: CPT | Performed by: STUDENT IN AN ORGANIZED HEALTH CARE EDUCATION/TRAINING PROGRAM

## 2024-01-03 PROCEDURE — 63600175 PHARM REV CODE 636 W HCPCS

## 2024-01-03 PROCEDURE — 85027 COMPLETE CBC AUTOMATED: CPT | Performed by: STUDENT IN AN ORGANIZED HEALTH CARE EDUCATION/TRAINING PROGRAM

## 2024-01-03 PROCEDURE — 25000003 PHARM REV CODE 250

## 2024-01-03 PROCEDURE — 80053 COMPREHEN METABOLIC PANEL: CPT | Performed by: STUDENT IN AN ORGANIZED HEALTH CARE EDUCATION/TRAINING PROGRAM

## 2024-01-03 PROCEDURE — 25000003 PHARM REV CODE 250: Performed by: STUDENT IN AN ORGANIZED HEALTH CARE EDUCATION/TRAINING PROGRAM

## 2024-01-03 PROCEDURE — 20600001 HC STEP DOWN PRIVATE ROOM

## 2024-01-03 PROCEDURE — 27000207 HC ISOLATION

## 2024-01-03 PROCEDURE — 83735 ASSAY OF MAGNESIUM: CPT | Performed by: STUDENT IN AN ORGANIZED HEALTH CARE EDUCATION/TRAINING PROGRAM

## 2024-01-03 PROCEDURE — 25000242 PHARM REV CODE 250 ALT 637 W/ HCPCS

## 2024-01-03 PROCEDURE — 36415 COLL VENOUS BLD VENIPUNCTURE: CPT | Performed by: STUDENT IN AN ORGANIZED HEALTH CARE EDUCATION/TRAINING PROGRAM

## 2024-01-03 RX ORDER — FUROSEMIDE 10 MG/ML
80 INJECTION INTRAMUSCULAR; INTRAVENOUS 3 TIMES DAILY
Status: DISCONTINUED | OUTPATIENT
Start: 2024-01-03 | End: 2024-01-05

## 2024-01-03 RX ORDER — AMIODARONE HYDROCHLORIDE 200 MG/1
200 TABLET ORAL DAILY
Status: DISCONTINUED | OUTPATIENT
Start: 2024-01-03 | End: 2024-01-18 | Stop reason: HOSPADM

## 2024-01-03 RX ADMIN — PRAVASTATIN SODIUM 80 MG: 40 TABLET ORAL at 09:01

## 2024-01-03 RX ADMIN — AMIODARONE HYDROCHLORIDE 0.5 MG/MIN: 1.8 INJECTION, SOLUTION INTRAVENOUS at 02:01

## 2024-01-03 RX ADMIN — EMPAGLIFLOZIN 10 MG: 10 TABLET, FILM COATED ORAL at 09:01

## 2024-01-03 RX ADMIN — AMIODARONE HYDROCHLORIDE 200 MG: 200 TABLET ORAL at 02:01

## 2024-01-03 RX ADMIN — HEPARIN SODIUM 5000 UNITS: 5000 INJECTION INTRAVENOUS; SUBCUTANEOUS at 09:01

## 2024-01-03 RX ADMIN — HEPARIN SODIUM 5000 UNITS: 5000 INJECTION INTRAVENOUS; SUBCUTANEOUS at 05:01

## 2024-01-03 RX ADMIN — FERROUS SULFATE TAB 325 MG (65 MG ELEMENTAL FE) 1 EACH: 325 (65 FE) TAB at 09:01

## 2024-01-03 RX ADMIN — FUROSEMIDE 80 MG: 10 INJECTION, SOLUTION INTRAVENOUS at 09:01

## 2024-01-03 RX ADMIN — FUROSEMIDE 80 MG: 10 INJECTION, SOLUTION INTRAVENOUS at 02:01

## 2024-01-03 RX ADMIN — ASPIRIN 325 MG: 325 TABLET, COATED ORAL at 09:01

## 2024-01-03 RX ADMIN — HEPARIN SODIUM 5000 UNITS: 5000 INJECTION INTRAVENOUS; SUBCUTANEOUS at 02:01

## 2024-01-03 NOTE — H&P
Inpatient EGD for progressive dysphagia was cancelled today since patient's A. Fib is being managed with new medicines as of today/overnight; can have EGD once this has stabilized, possibly before discharge, with general GI service.

## 2024-01-03 NOTE — ASSESSMENT & PLAN NOTE
Chronic, controlled. Latest blood pressure and vitals reviewed-     Temp:  [97.3 °F (36.3 °C)-98.5 °F (36.9 °C)]   Pulse:  [56-77]   Resp:  [18-20]   BP: ()/(64-75)   SpO2:  [97 %-100 %] .   Home meds for hypertension were reviewed and noted below.   Hypertension Medications               furosemide (LASIX) 80 MG tablet TAKE 1 TABLET EVERY DAY    metoprolol succinate (TOPROL-XL) 50 MG 24 hr tablet TAKE 1 TABLET EVERY DAY    spironolactone (ALDACTONE) 25 MG tablet TAKE 1/2 TABLET (12.5 MG TOTAL) ONCE DAILY. HOLD IF SYSTOLIC BLOOD PRESSURE IS LESS THAN 110            While in the hospital, will manage blood pressure as follows; Adjust home antihypertensive regimen as follows- Holding in setting of borderline pressures in setting of new diuresis     Will utilize p.r.n. blood pressure medication only if patient's blood pressure greater than 180/110 and he develops symptoms such as worsening chest pain or shortness of breath.

## 2024-01-03 NOTE — ASSESSMENT & PLAN NOTE
Complaints of several day history of N/V on admission. Also noted 3 month history of difficulty swallowing solid foods.    - Consulted SLP, able to tolerate liquids  - Consulted GI, planning for EGD today

## 2024-01-03 NOTE — ASSESSMENT & PLAN NOTE
Stable on admission, no acute issues, he denies discharge. QTc chronically prolonged. Continue home amiodarone for NSVT prevention. Monitor on telemetry     - 12/31, noted to have AF RVR with hypotension. Started on amio gtt. Per report, pacemaker malfunctioning. EP consulted for formal evaluation   - Restarted on home amio  - Device interrogated, EP has signed off

## 2024-01-03 NOTE — NURSING
Unable to get airstrip documented d/t it shows general error, and paper strip printed in pt's chart.

## 2024-01-03 NOTE — TELEPHONE ENCOUNTER
"----- Message from Gi Phan sent at 2024  9:04 AM CST -----  Regarding: FW: Colonoscopy    ----- Message -----  From: Sarah Dougherty DO  Sent: 2023   2:20 PM CST  To: [facility]  Subject: Colonoscopy                                      Procedure: Colonoscopy    Diagnosis: Surveillance colonoscopy - Hx of colon polyps    Procedure Timin-12 weeks    #If within 4 weeks selected, please stephanie as high priority#    #If greater than 12 weeks, please select "5-12 weeks" and delay sending until 2 months prior to requested date#     Provider: Any GI provider    Location: [address]    Additional Scheduling Information: AICD in place and Diabetes    Prep Specifications:Extended/Constipation prep    Is the patient taking a GLP-1 Agonist:no    Have you attached a patient to this message: yes        "

## 2024-01-03 NOTE — ASSESSMENT & PLAN NOTE
Creatine stable for now. BMP reviewed- noted Estimated Creatinine Clearance: 51.5 mL/min (A) (based on SCr of 1.9 mg/dL (H)). according to latest data. Based on current GFR, CKD stage is stage 3 - GFR 30-59.  Monitor UOP and serial BMP and adjust therapy as needed. Renally dose meds. Avoid nephrotoxic medications and procedures.

## 2024-01-03 NOTE — PROGRESS NOTES
[facility] - Cardiology [facility] Medicine  Progress Note    Patient Name: Papito Bhakta  MRN: [id number]  Patient Class: IP- Inpatient   Admission Date: 12/29/2023  Length of Stay: 1 days  Attending Physician: Shell Medrano*  Primary Care Provider: Brynn To MD        Subjective:     Principal Problem:Acute on chronic combined systolic and diastolic CHF (congestive heart failure)        HPI:  Papito Bhakta is a 68 y.o. male with NICM, combined CHF(11/2023 EF 10 -15%, G3DD) s/p ICD placement, CKD, HLD, HTN, and AMELIA who presents for bilateral lower extremity swelling and shortness of breath. Patient reports he has been having worsening shortness of breath for the past 6 months. He had acutely worsening SOB today where he described becoming profoundly dyspneic on exertion. He reported taking 2 of his 80 mg Lasix this morning with subsequent minimal urine output and minimal improvement in his SOB. He reports SOB aggravated with lying down and he requires frequent positional changes to keep comfortable. He reports additional poor appetite and urine output for the past week although he reports he has been drinking well. He had 2 episodes of nausea/vomiting this past week as well. He denies fever/chills, chest pain, abdominal pain, recent illness, medication changes. Patient reports adherence with all medications. He reports he lives with his daughter who helps him with his medications and healthcare.    Additionally he reports chronic leg pain from a chronic RLE wound. He went to wound care yesterday where dressings were changed and he was told they were healing well. He has bilateral lower extremity edema R>L that is typical for him and he denies recent worsening.    Overview/Hospital Course:  Pt admitted to hospital medicine for acute heart failure exacerbation. Continuing diuresis with lasix 80 IV BID. Also complaining of inability to swallow for 3 months duration. SLP evaluated the patient and  determined that he could tolerate liquids. GI has been consulted, planning for EGD. New leukocytosis noted 12/31, work up significant for COVID positive. Started remdesivir, steroids held in order to prevent worsening fluid retention. Overnight 12/31, noted to be tachycardic and hypotensive (asymptomatic). Cardiology called, thought to be AF RVR and po amio transitioned to amio gtt. EP interrogated device. GI planning for EGD today.    Interval History: NAEO. Still requiring 2L O2. No complaints this morning.     Review of Systems   Respiratory:  Positive for cough and shortness of breath.      Objective:     Vital Signs (Most Recent):  Temp: 97.5 °F (36.4 °C) (01/03/24 1200)  Pulse: 75 (01/03/24 1200)  Resp: 18 (01/03/24 1200)  BP: 98/68 (01/03/24 1200)  SpO2: 99 % (01/03/24 1200) Vital Signs (24h Range):  Temp:  [97.3 °F (36.3 °C)-98.5 °F (36.9 °C)] 97.5 °F (36.4 °C)  Pulse:  [56-77] 75  Resp:  [18-20] 18  SpO2:  [97 %-100 %] 99 %  BP: ()/(64-75) 98/68     Weight: 110.9 kg (244 lb 7.8 oz)  Body mass index is 28.99 kg/m².    Intake/Output Summary (Last 24 hours) at 1/3/2024 1342  Last data filed at 1/3/2024 1229  Gross per 24 hour   Intake --   Output 1300 ml   Net -1300 ml         Physical Exam  Vitals and nursing note reviewed.   Constitutional:       Appearance: He is ill-appearing.   HENT:      Head: Normocephalic and atraumatic.      Mouth/Throat:      Mouth: Mucous membranes are moist.   Eyes:      General: Scleral icterus present.      Extraocular Movements: Extraocular movements intact.      Comments: Anisocoria   Cardiovascular:      Rate and Rhythm: Normal rate and regular rhythm.      Heart sounds:      Gallop present.   Pulmonary:      Effort: Pulmonary effort is normal. No respiratory distress.      Breath sounds: Normal breath sounds. No wheezing.   Abdominal:      General: Abdomen is flat. There is no distension.      Palpations: Abdomen is soft.      Tenderness: There is no abdominal  tenderness.   Musculoskeletal:         General: Tenderness (LLE tenderness) present.      Right lower leg: Edema present.      Left lower leg: Edema present.      Comments: Anasarca, wound dressings on RLE and LLE   Skin:     General: Skin is warm and dry.   Neurological:      Mental Status: He is alert and oriented to person, place, and time.      Motor: Weakness present.   Psychiatric:         Mood and Affect: Mood normal.         Behavior: Behavior normal.             Significant Labs: All pertinent labs within the past 24 hours have been reviewed.    Significant Imaging: I have reviewed all pertinent imaging results/findings within the past 24 hours.    Assessment/Plan:      * Acute on chronic combined systolic and diastolic CHF (congestive heart failure)  Patient is identified as having Combined Systolic and Diastolic heart failure that is Acute on chronic. CHF is currently uncontrolled due to Pulmonary edema/pleural effusion on CXR. Latest ECHO performed and demonstrates- Results for orders placed during the hospital encounter of 11/10/23    Echo    Interpretation Summary    Left Ventricle: The left ventricle is severely dilated. Mildly increased wall thickness. There is mild concentric hypertrophy. Severe global hypokinesis present. There is severely reduced systolic function with a visually estimated ejection fraction of 10 -15%. Grade III diastolic dysfunction.    Right Ventricle: Severe right ventricular enlargement. Systolic function is severely reduced.    Mitral Valve: There is no stenosis. There is mild to moderate regurgitation with a centrally directed jet.    Tricuspid Valve: There is mild to moderate regurgitation.    Pulmonary Artery: The estimated pulmonary artery systolic pressure is 67 mmHg.  .Last BNP reviewed- and noted below   Recent Labs   Lab 01/01/24  0551   BNP 2,684*         Presented for acute on chronic SOB with associated low UOP. Afebrile HDS. BNP 3,067, Troponin 0.031. CXR with  cardiomegaly, vascular congestion, and edema. Received 100 of Lasix in the ED.    - RIP positive for Covid  - IV lasix 80 BID. If urine output suboptimal consider increasing dose or additional diuretics in combination  - continue home Jardiance   - Beta blocker/aldactone held given soft pressures, resume when appropriate  - Cardiac diet with Fluid restriction at 1.5L with strict I/Os and daily STANDING weights  - Check Electrolytes, keep Mag >2 & K+ >4  - SCDs, Ambulate as tolerated    - Strict I&Os, Daily standing weights  - Review Low-salt diet and enforce medication adherence on discharge    CKD (chronic kidney disease)  Creatine stable for now. BMP reviewed- noted Estimated Creatinine Clearance: 51.5 mL/min (A) (based on SCr of 1.9 mg/dL (H)). according to latest data. Based on current GFR, CKD stage is stage 3 - GFR 30-59.  Monitor UOP and serial BMP and adjust therapy as needed. Renally dose meds. Avoid nephrotoxic medications and procedures.    COVID-19  COVID positive, noted 12/31    - Finished remdesivir course yesterday  - Hold steroids at this time, hesitant to exacerbate fluid retention     Microcytic anemia  Iron studies notable for Fe 26 and sat iron 8%. TIBC, ferritin, transferrin wnl. Likely iron deficiency anemia.    - Daily iron tablet  - Daily CBCs    Dysphagia  Reports a 3 month history of inability to tolerate solid foods. SLP has assessed him, can tolerate liquids.     - GI consulted, planning for EGD today  - Adding boost to meals      Pupil asymmetry  Notable asymetry on pupils by patient. Chart review shows left orbital trauma in 2021 with notes concerned for dilation and vision changes. When talking to daughter, this is chronic. States no vision changes or neuro symptoms whatsoever.     Serum total bilirubin elevated  Direct bili 4.5. No signs of hemolysis.    - Daily CMPs      Nausea and vomiting  Complaints of several day history of N/V on admission. Also noted 3 month history of  difficulty swallowing solid foods.    - Consulted SLP, able to tolerate liquids  - Consulted GI, planning for EGD today      Acute renal failure superimposed on stage 3a chronic kidney disease  Creatinine 2.1 on admit, baseline around 1.4. Likely prerenal etiology given urine sodium and FENa vs progression of CKD. Given stability of Cr, likely new baseline.   Recent Labs     01/01/24  0551 01/02/24  0406 01/03/24  0825   BUN 66* 65* 68*   CREATININE 2.1* 1.9* 1.9*         Estimated Creatinine Clearance: 51.5 mL/min (A) (based on SCr of 1.9 mg/dL (H)).  Improving    - Renal US: renal cysts, no hydronephrosis  - Urine Na: 14, Pr/Cr: 0.72; FENa 0.2%  - Monitor urine output and serial BMP and adjust therapy as needed.   - Strict I&Os and daily weights   - Avoid nephrotoxic agents such as NSAIDs, gadolinium and IV radiocontrast.  - Renally dose meds to current GFR.  - Maintain MAP > 65.  - Nephrology referral outpatient    AMELIA (obstructive sleep apnea)  Carried diagnosis of AMELIA per chart, however he does not sleep with CPAP at home and declines while here      NICM (nonischemic cardiomyopathy)  ASA 325mg qd per home medication list. Reason for this dose unclear.    - Restarting home amio       Biventricular ICD (implantable cardioverter-defibrillator) in place  Stable on admission, no acute issues, he denies discharge. QTc chronically prolonged. Continue home amiodarone for NSVT prevention. Monitor on telemetry     - 12/31, noted to have AF RVR with hypotension. Started on amio gtt. Per report, pacemaker malfunctioning. EP consulted for formal evaluation   - Restarted on home amio  - Device interrogated, EP has signed off    Hyperlipidemia  Stable. Continue Home Pravastatin.        Essential hypertension  Chronic, controlled. Latest blood pressure and vitals reviewed-     Temp:  [97.3 °F (36.3 °C)-98.5 °F (36.9 °C)]   Pulse:  [56-77]   Resp:  [18-20]   BP: ()/(64-75)   SpO2:  [97 %-100 %] .   Home meds for  hypertension were reviewed and noted below.   Hypertension Medications               furosemide (LASIX) 80 MG tablet TAKE 1 TABLET EVERY DAY    metoprolol succinate (TOPROL-XL) 50 MG 24 hr tablet TAKE 1 TABLET EVERY DAY    spironolactone (ALDACTONE) 25 MG tablet TAKE 1/2 TABLET (12.5 MG TOTAL) ONCE DAILY. HOLD IF SYSTOLIC BLOOD PRESSURE IS LESS THAN 110            While in the hospital, will manage blood pressure as follows; Adjust home antihypertensive regimen as follows- Holding in setting of borderline pressures in setting of new diuresis     Will utilize p.r.n. blood pressure medication only if patient's blood pressure greater than 180/110 and he develops symptoms such as worsening chest pain or shortness of breath.      VTE Risk Mitigation (From admission, onward)           Ordered     heparin (porcine) injection 5,000 Units  Every 8 hours         12/29/23 1759     IP VTE HIGH RISK PATIENT  Once         12/29/23 1759     Place sequential compression device  Until discontinued         12/29/23 1759                    Discharge Planning   MARIIA: 1/4/2024     Code Status: Full Code   Is the patient medically ready for discharge?: No    Reason for patient still in hospital (select all that apply): Treatment and Consult recommendations  Discharge Plan A: Home with family                  Deepa Holly MD  Department of Hospital Medicine   [facility] - Cardiology Stepdown

## 2024-01-03 NOTE — ASSESSMENT & PLAN NOTE
Reports a 3 month history of inability to tolerate solid foods. SLP has assessed him, can tolerate liquids.     - GI consulted, planning for EGD today  - Adding boost to meals

## 2024-01-03 NOTE — ASSESSMENT & PLAN NOTE
Patient is identified as having Combined Systolic and Diastolic heart failure that is Acute on chronic. CHF is currently uncontrolled due to Pulmonary edema/pleural effusion on CXR. Latest ECHO performed and demonstrates- Results for orders placed during the hospital encounter of 11/10/23    Echo    Interpretation Summary    Left Ventricle: The left ventricle is severely dilated. Mildly increased wall thickness. There is mild concentric hypertrophy. Severe global hypokinesis present. There is severely reduced systolic function with a visually estimated ejection fraction of 10 -15%. Grade III diastolic dysfunction.    Right Ventricle: Severe right ventricular enlargement. Systolic function is severely reduced.    Mitral Valve: There is no stenosis. There is mild to moderate regurgitation with a centrally directed jet.    Tricuspid Valve: There is mild to moderate regurgitation.    Pulmonary Artery: The estimated pulmonary artery systolic pressure is 67 mmHg.  .Last BNP reviewed- and noted below   Recent Labs   Lab 01/01/24  0551   BNP 2,684*         Presented for acute on chronic SOB with associated low UOP. Afebrile HDS. BNP 3,067, Troponin 0.031. CXR with cardiomegaly, vascular congestion, and edema. Received 100 of Lasix in the ED.    - RIP positive for Covid  - IV lasix 80 BID. If urine output suboptimal consider increasing dose or additional diuretics in combination  - continue home Jardiance   - Beta blocker/aldactone held given soft pressures, resume when appropriate  - Cardiac diet with Fluid restriction at 1.5L with strict I/Os and daily STANDING weights  - Check Electrolytes, keep Mag >2 & K+ >4  - SCDs, Ambulate as tolerated    - Strict I&Os, Daily standing weights  - Review Low-salt diet and enforce medication adherence on discharge

## 2024-01-03 NOTE — ASSESSMENT & PLAN NOTE
COVID positive, noted 12/31    - Finished remdesivir course yesterday  - Hold steroids at this time, hesitant to exacerbate fluid retention

## 2024-01-03 NOTE — ASSESSMENT & PLAN NOTE
Creatinine 2.1 on admit, baseline around 1.4. Likely prerenal etiology given urine sodium and FENa vs progression of CKD. Given stability of Cr, likely new baseline.   Recent Labs     01/01/24  0551 01/02/24  0406 01/03/24  0825   BUN 66* 65* 68*   CREATININE 2.1* 1.9* 1.9*         Estimated Creatinine Clearance: 51.5 mL/min (A) (based on SCr of 1.9 mg/dL (H)).  Improving    - Renal US: renal cysts, no hydronephrosis  - Urine Na: 14, Pr/Cr: 0.72; FENa 0.2%  - Monitor urine output and serial BMP and adjust therapy as needed.   - Strict I&Os and daily weights   - Avoid nephrotoxic agents such as NSAIDs, gadolinium and IV radiocontrast.  - Renally dose meds to current GFR.  - Maintain MAP > 65.  - Nephrology referral outpatient

## 2024-01-03 NOTE — SUBJECTIVE & OBJECTIVE
Interval History: NAEO. Still requiring 2L O2. No complaints this morning.     Review of Systems   Respiratory:  Positive for cough and shortness of breath.      Objective:     Vital Signs (Most Recent):  Temp: 97.5 °F (36.4 °C) (01/03/24 1200)  Pulse: 75 (01/03/24 1200)  Resp: 18 (01/03/24 1200)  BP: 98/68 (01/03/24 1200)  SpO2: 99 % (01/03/24 1200) Vital Signs (24h Range):  Temp:  [97.3 °F (36.3 °C)-98.5 °F (36.9 °C)] 97.5 °F (36.4 °C)  Pulse:  [56-77] 75  Resp:  [18-20] 18  SpO2:  [97 %-100 %] 99 %  BP: ()/(64-75) 98/68     Weight: 110.9 kg (244 lb 7.8 oz)  Body mass index is 28.99 kg/m².    Intake/Output Summary (Last 24 hours) at 1/3/2024 1342  Last data filed at 1/3/2024 1229  Gross per 24 hour   Intake --   Output 1300 ml   Net -1300 ml         Physical Exam  Vitals and nursing note reviewed.   Constitutional:       Appearance: He is ill-appearing.   HENT:      Head: Normocephalic and atraumatic.      Mouth/Throat:      Mouth: Mucous membranes are moist.   Eyes:      General: Scleral icterus present.      Extraocular Movements: Extraocular movements intact.      Comments: Anisocoria   Cardiovascular:      Rate and Rhythm: Normal rate and regular rhythm.      Heart sounds:      Gallop present.   Pulmonary:      Effort: Pulmonary effort is normal. No respiratory distress.      Breath sounds: Normal breath sounds. No wheezing.   Abdominal:      General: Abdomen is flat. There is no distension.      Palpations: Abdomen is soft.      Tenderness: There is no abdominal tenderness.   Musculoskeletal:         General: Tenderness (LLE tenderness) present.      Right lower leg: Edema present.      Left lower leg: Edema present.      Comments: Anasarca, wound dressings on RLE and LLE   Skin:     General: Skin is warm and dry.   Neurological:      Mental Status: He is alert and oriented to person, place, and time.      Motor: Weakness present.   Psychiatric:         Mood and Affect: Mood normal.         Behavior:  Behavior normal.             Significant Labs: All pertinent labs within the past 24 hours have been reviewed.    Significant Imaging: I have reviewed all pertinent imaging results/findings within the past 24 hours.

## 2024-01-03 NOTE — NURSING
Notified team pt's left pupil is much bigger than Rt pupil, but still see well, left size is 8 to 9, and Rt pupil is like 3, left pupil only shrink a little maybe to 7 when shine the light, team said it's baseline, no new order, WCTM.

## 2024-01-04 ENCOUNTER — TELEPHONE (OUTPATIENT)
Dept: ENDOSCOPY | Facility: HOSPITAL | Age: 69
End: 2024-01-04
Payer: MEDICARE

## 2024-01-04 LAB
ALBUMIN SERPL BCP-MCNC: 2.5 G/DL (ref 3.5–5.2)
ALP SERPL-CCNC: 102 U/L (ref 55–135)
ALT SERPL W/O P-5'-P-CCNC: 39 U/L (ref 10–44)
ANION GAP SERPL CALC-SCNC: 15 MMOL/L (ref 8–16)
ANION GAP SERPL CALC-SCNC: 15 MMOL/L (ref 8–16)
AST SERPL-CCNC: 41 U/L (ref 10–40)
BILIRUB SERPL-MCNC: 6.3 MG/DL (ref 0.1–1)
BUN SERPL-MCNC: 62 MG/DL (ref 8–23)
BUN SERPL-MCNC: 64 MG/DL (ref 8–23)
CALCIUM SERPL-MCNC: 9.1 MG/DL (ref 8.7–10.5)
CALCIUM SERPL-MCNC: 9.2 MG/DL (ref 8.7–10.5)
CHLORIDE SERPL-SCNC: 96 MMOL/L (ref 95–110)
CHLORIDE SERPL-SCNC: 97 MMOL/L (ref 95–110)
CO2 SERPL-SCNC: 25 MMOL/L (ref 23–29)
CO2 SERPL-SCNC: 27 MMOL/L (ref 23–29)
CREAT SERPL-MCNC: 1.5 MG/DL (ref 0.5–1.4)
CREAT SERPL-MCNC: 1.7 MG/DL (ref 0.5–1.4)
ERYTHROCYTE [DISTWIDTH] IN BLOOD BY AUTOMATED COUNT: 21.6 % (ref 11.5–14.5)
EST. GFR  (NO RACE VARIABLE): 43.4 ML/MIN/1.73 M^2
EST. GFR  (NO RACE VARIABLE): 50.4 ML/MIN/1.73 M^2
GLUCOSE SERPL-MCNC: 104 MG/DL (ref 70–110)
GLUCOSE SERPL-MCNC: 114 MG/DL (ref 70–110)
HCT VFR BLD AUTO: 40.8 % (ref 40–54)
HGB BLD-MCNC: 13.6 G/DL (ref 14–18)
MAGNESIUM SERPL-MCNC: 2.2 MG/DL (ref 1.6–2.6)
MCH RBC QN AUTO: 23.3 PG (ref 27–31)
MCHC RBC AUTO-ENTMCNC: 33.3 G/DL (ref 32–36)
MCV RBC AUTO: 70 FL (ref 82–98)
PHOSPHATE SERPL-MCNC: 2.9 MG/DL (ref 2.7–4.5)
PLATELET # BLD AUTO: 89 K/UL (ref 150–450)
PMV BLD AUTO: ABNORMAL FL (ref 9.2–12.9)
POTASSIUM SERPL-SCNC: 3.4 MMOL/L (ref 3.5–5.1)
POTASSIUM SERPL-SCNC: 4 MMOL/L (ref 3.5–5.1)
PROT SERPL-MCNC: 6.7 G/DL (ref 6–8.4)
RBC # BLD AUTO: 5.84 M/UL (ref 4.6–6.2)
SODIUM SERPL-SCNC: 137 MMOL/L (ref 136–145)
SODIUM SERPL-SCNC: 138 MMOL/L (ref 136–145)
WBC # BLD AUTO: 8.53 K/UL (ref 3.9–12.7)

## 2024-01-04 PROCEDURE — 80053 COMPREHEN METABOLIC PANEL: CPT | Performed by: STUDENT IN AN ORGANIZED HEALTH CARE EDUCATION/TRAINING PROGRAM

## 2024-01-04 PROCEDURE — 99232 SBSQ HOSP IP/OBS MODERATE 35: CPT | Mod: ,,,

## 2024-01-04 PROCEDURE — 84100 ASSAY OF PHOSPHORUS: CPT | Performed by: STUDENT IN AN ORGANIZED HEALTH CARE EDUCATION/TRAINING PROGRAM

## 2024-01-04 PROCEDURE — 85027 COMPLETE CBC AUTOMATED: CPT | Performed by: STUDENT IN AN ORGANIZED HEALTH CARE EDUCATION/TRAINING PROGRAM

## 2024-01-04 PROCEDURE — 36415 COLL VENOUS BLD VENIPUNCTURE: CPT | Mod: XB

## 2024-01-04 PROCEDURE — 63600175 PHARM REV CODE 636 W HCPCS: Mod: JZ,JG

## 2024-01-04 PROCEDURE — 63600175 PHARM REV CODE 636 W HCPCS: Performed by: STUDENT IN AN ORGANIZED HEALTH CARE EDUCATION/TRAINING PROGRAM

## 2024-01-04 PROCEDURE — 25000003 PHARM REV CODE 250: Performed by: STUDENT IN AN ORGANIZED HEALTH CARE EDUCATION/TRAINING PROGRAM

## 2024-01-04 PROCEDURE — 25000003 PHARM REV CODE 250

## 2024-01-04 PROCEDURE — 63600175 PHARM REV CODE 636 W HCPCS

## 2024-01-04 PROCEDURE — 25000242 PHARM REV CODE 250 ALT 637 W/ HCPCS

## 2024-01-04 PROCEDURE — 27000207 HC ISOLATION

## 2024-01-04 PROCEDURE — 80048 BASIC METABOLIC PNL TOTAL CA: CPT | Mod: XB

## 2024-01-04 PROCEDURE — 20600001 HC STEP DOWN PRIVATE ROOM

## 2024-01-04 PROCEDURE — 36415 COLL VENOUS BLD VENIPUNCTURE: CPT | Performed by: STUDENT IN AN ORGANIZED HEALTH CARE EDUCATION/TRAINING PROGRAM

## 2024-01-04 PROCEDURE — 83735 ASSAY OF MAGNESIUM: CPT | Performed by: STUDENT IN AN ORGANIZED HEALTH CARE EDUCATION/TRAINING PROGRAM

## 2024-01-04 PROCEDURE — 92526 ORAL FUNCTION THERAPY: CPT

## 2024-01-04 RX ADMIN — FUROSEMIDE 80 MG: 10 INJECTION, SOLUTION INTRAVENOUS at 09:01

## 2024-01-04 RX ADMIN — HEPARIN SODIUM 5000 UNITS: 5000 INJECTION INTRAVENOUS; SUBCUTANEOUS at 02:01

## 2024-01-04 RX ADMIN — HEPARIN SODIUM 5000 UNITS: 5000 INJECTION INTRAVENOUS; SUBCUTANEOUS at 06:01

## 2024-01-04 RX ADMIN — ASPIRIN 325 MG: 325 TABLET, COATED ORAL at 09:01

## 2024-01-04 RX ADMIN — FERUMOXYTOL 510 MG: 510 INJECTION INTRAVENOUS at 05:01

## 2024-01-04 RX ADMIN — POTASSIUM BICARBONATE 40 MEQ: 391 TABLET, EFFERVESCENT ORAL at 12:01

## 2024-01-04 RX ADMIN — FUROSEMIDE 80 MG: 10 INJECTION, SOLUTION INTRAVENOUS at 02:01

## 2024-01-04 RX ADMIN — HEPARIN SODIUM 5000 UNITS: 5000 INJECTION INTRAVENOUS; SUBCUTANEOUS at 09:01

## 2024-01-04 RX ADMIN — PRAVASTATIN SODIUM 80 MG: 40 TABLET ORAL at 09:01

## 2024-01-04 RX ADMIN — EMPAGLIFLOZIN 10 MG: 10 TABLET, FILM COATED ORAL at 09:01

## 2024-01-04 RX ADMIN — FERROUS SULFATE TAB 325 MG (65 MG ELEMENTAL FE) 1 EACH: 325 (65 FE) TAB at 09:01

## 2024-01-04 RX ADMIN — POTASSIUM BICARBONATE 40 MEQ: 391 TABLET, EFFERVESCENT ORAL at 09:01

## 2024-01-04 RX ADMIN — AMIODARONE HYDROCHLORIDE 200 MG: 200 TABLET ORAL at 09:01

## 2024-01-04 NOTE — PLAN OF CARE
VSS. Amiodarone gtt discontinued during the day. Pt educated on fall risk and remained free from falls/trauma/injury. Denies chest pain, SOB, palpitations, dizziness, pain, or discomfort. Plan of care reviewed with pt, all questions answered. Bed locked in lowest position, call bell within reach, no acute distress noted, will continue to monitor.

## 2024-01-04 NOTE — ASSESSMENT & PLAN NOTE
Direct bili 4.5. No signs of hemolysis.    - Daily CMPs  - Appears chronic since November. Previously reported as secondary to congestive hepatopathy however AST only mildly elevated and LFTS close to normal limits.  - US Liver with doppler showing: Bidirectional portal vein flow may be seen with right heart failure, tricuspid regurgitation, or portal hypertension. Evidence of volume overload with distended IVC and hepatic veins.  Pulsatile flow through the hepatic veins (also possibly suggesting tricuspid regurgitation). Suspected hepatic steatosis. No evidence of biliary obstruction.

## 2024-01-04 NOTE — PLAN OF CARE
K replaced during the day. VSS. Daughter at bedside. NPO start midnight for EGD tomorrow. Pt educated on fall risk and remained free from falls/trauma/injury. Denies chest pain, SOB, palpitations, dizziness, pain, or discomfort. Plan of care reviewed with pt, all questions answered. Bed locked in lowest position, call bell within reach, no acute distress noted, will continue to monitor.

## 2024-01-04 NOTE — ASSESSMENT & PLAN NOTE
Creatinine 2.1 on admit, baseline around 1.4. Likely prerenal etiology given urine sodium and FENa vs progression of CKD. Given stability of Cr, likely new baseline.   Recent Labs     01/02/24  0406 01/03/24  0825 01/04/24  0530   BUN 65* 68* 64*   CREATININE 1.9* 1.9* 1.7*         Estimated Creatinine Clearance: 57 mL/min (A) (based on SCr of 1.7 mg/dL (H)).  Improving    - Renal US: renal cysts, no hydronephrosis  - Urine Na: 14, Pr/Cr: 0.72; FENa 0.2%  - Monitor urine output and serial BMP and adjust therapy as needed.   - Strict I&Os and daily weights   - Avoid nephrotoxic agents such as NSAIDs, gadolinium and IV radiocontrast.  - Renally dose meds to current GFR.  - Maintain MAP > 65.  - Nephrology referral outpatient

## 2024-01-04 NOTE — TELEPHONE ENCOUNTER
"----- Message from Gi Phan sent at 2024  9:04 AM CST -----  Regarding: FW: Colonoscopy    ----- Message -----  From: Sarah Dougherty DO  Sent: 2023   2:20 PM CST  To: [facility]  Subject: Colonoscopy                                      Procedure: Colonoscopy    Diagnosis: Surveillance colonoscopy - Hx of colon polyps    Procedure Timin-12 weeks    #If within 4 weeks selected, please stephanie as high priority#    #If greater than 12 weeks, please select "5-12 weeks" and delay sending until 2 months prior to requested date#     Provider: Any GI provider    Location: [address]    Additional Scheduling Information: AICD in place and Diabetes    Prep Specifications:Extended/Constipation prep    Is the patient taking a GLP-1 Agonist:no    Have you attached a patient to this message: yes        "

## 2024-01-04 NOTE — PLAN OF CARE
Pt maintained free from falls/trauma/injuries and skin breakdown. Pt denied pain or discomfort. Plan of care reviewed. Pt verbalized understanding. All questions and concerns addressed. Will continue to monitor.  Problem: Adult Inpatient Plan of Care  Goal: Plan of Care Review  Outcome: Ongoing, Progressing  Goal: Patient-Specific Goal (Individualized)  Outcome: Ongoing, Progressing  Goal: Absence of Hospital-Acquired Illness or Injury  Outcome: Ongoing, Progressing  Goal: Optimal Comfort and Wellbeing  Outcome: Ongoing, Progressing  Goal: Readiness for Transition of Care  Outcome: Ongoing, Progressing     Problem: Fluid and Electrolyte Imbalance (Acute Kidney Injury/Impairment)  Goal: Fluid and Electrolyte Balance  Outcome: Ongoing, Progressing     Problem: Oral Intake Inadequate (Acute Kidney Injury/Impairment)  Goal: Optimal Nutrition Intake  Outcome: Ongoing, Progressing     Problem: Renal Function Impairment (Acute Kidney Injury/Impairment)  Goal: Effective Renal Function  Outcome: Ongoing, Progressing     Problem: Impaired Wound Healing  Goal: Optimal Wound Healing  Outcome: Ongoing, Progressing     Problem: Adjustment to Illness (Heart Failure)  Goal: Optimal Coping  Outcome: Ongoing, Progressing     Problem: Cardiac Output Decreased (Heart Failure)  Goal: Optimal Cardiac Output  Outcome: Ongoing, Progressing     Problem: Dysrhythmia (Heart Failure)  Goal: Stable Heart Rate and Rhythm  Outcome: Ongoing, Progressing     Problem: Fluid Imbalance (Heart Failure)  Goal: Fluid Balance  Outcome: Ongoing, Progressing     Problem: Functional Ability Impaired (Heart Failure)  Goal: Optimal Functional Ability  Outcome: Ongoing, Progressing     Problem: Oral Intake Inadequate (Heart Failure)  Goal: Optimal Nutrition Intake  Outcome: Ongoing, Progressing     Problem: Respiratory Compromise (Heart Failure)  Goal: Effective Oxygenation and Ventilation  Outcome: Ongoing, Progressing     Problem: Sleep Disordered Breathing  (Heart Failure)  Goal: Effective Breathing Pattern During Sleep  Outcome: Ongoing, Progressing     Problem: Skin Injury Risk Increased  Goal: Skin Health and Integrity  Outcome: Ongoing, Progressing

## 2024-01-04 NOTE — ASSESSMENT & PLAN NOTE
Reports a 3 month history of inability to tolerate solid foods. SLP has assessed him, can tolerate liquids.     - GI consulted, planning for EGD Friday  - Adding boost to meals after EGD, currently on clear liquid diet

## 2024-01-04 NOTE — ASSESSMENT & PLAN NOTE
Stable on admission, no acute issues, he denies discharge. QTc chronically prolonged. Continue home amiodarone for NSVT prevention. Monitor on telemetry     - 12/31, noted to have AF RVR with hypotension. Started on amio gtt. Per report, pacemaker malfunctioning. EP consulted for formal evaluation   - Restarted on home oral amio  - Device interrogated, EP has signed off

## 2024-01-04 NOTE — ASSESSMENT & PLAN NOTE
Complaints of several day history of N/V on admission. Also noted 3 month history of difficulty swallowing solid foods.    - Consulted SLP, able to tolerate liquids  - Consulted GI, planning for EGD Friday  - Continuing clear liquid diet as per SLP

## 2024-01-04 NOTE — ASSESSMENT & PLAN NOTE
The patient is being consulted to gastroenterology for dysphagia to solids from last 3 months and now started having dysphagia with liquids.  Dysphagia is associated with nausea, vomiting and weight loss  During this hospitalization he is being treated for CHF exacerbation  Hbg trend 13.5, 13.8 BUN 69, 64. Bilirubin 5.8, AST 53, ALT 37.     Recommendations  - EGD planned for tomorrow as long as patient remains hemodynamically stable.  - NPO after midnight.

## 2024-01-04 NOTE — ASSESSMENT & PLAN NOTE
Iron studies notable for Fe 26 and sat iron 8%. TIBC, ferritin, transferrin wnl. Likely iron deficiency anemia.    - Daily iron tablet  - Daily CBCs  - Consider IV iron supplementation

## 2024-01-04 NOTE — SUBJECTIVE & OBJECTIVE
Subjective:     Interval History: Followed up with patient for consideration of EGD due to his ongoing dysphagia. Tolerating thin liquids per SLP. Vitals stable overnight. Episode of questionable A.Fib vs. Paced ventricular tachycardia. Device interrogation scheduled for today. EGD planned for tomorrow.     Review of Systems   Constitutional:  Negative for activity change, appetite change, fatigue and fever.   HENT:  Positive for trouble swallowing. Negative for sore throat.    Gastrointestinal:  Negative for abdominal distention, abdominal pain, anal bleeding, blood in stool, constipation, diarrhea, nausea, rectal pain and vomiting.   Skin:  Negative for color change and pallor.   Neurological:  Negative for dizziness, syncope and weakness.     Objective:     Vital Signs (Most Recent):  Temp: 97.6 °F (36.4 °C) (01/04/24 0750)  Pulse: 70 (01/04/24 0750)  Resp: 18 (01/04/24 0750)  BP: 103/69 (01/04/24 0750)  SpO2: 95 % (01/04/24 0750) Vital Signs (24h Range):  Temp:  [97.2 °F (36.2 °C)-98.5 °F (36.9 °C)] 97.6 °F (36.4 °C)  Pulse:  [66-87] 70  Resp:  [18-20] 18  SpO2:  [95 %-100 %] 95 %  BP: ()/(64-71) 103/69     Weight: 108.6 kg (239 lb 6.7 oz) (01/04/24 0520)  Body mass index is 28.39 kg/m².      Intake/Output Summary (Last 24 hours) at 1/4/2024 0929  Last data filed at 1/4/2024 0445  Gross per 24 hour   Intake 360 ml   Output 1650 ml   Net -1290 ml       Lines/Drains/Airways       Peripheral Intravenous Line  Duration                  Peripheral IV - Single Lumen 12/31/23 1752 20 G Left;Posterior Forearm 3 days                     Physical Exam  Vitals reviewed.   Constitutional:       Appearance: He is normal weight. He is not ill-appearing.   HENT:      Mouth/Throat:      Mouth: Mucous membranes are dry.      Pharynx: Oropharynx is clear.   Eyes:      General: No scleral icterus.  Abdominal:      General: Bowel sounds are normal. There is no distension.      Palpations: Abdomen is soft. There is no mass.  "  Skin:     General: Skin is warm and dry.      Capillary Refill: Capillary refill takes less than 2 seconds.      Coloration: Skin is not jaundiced or pale.   Neurological:      Mental Status: He is alert and oriented to person, place, and time. Mental status is at baseline.          Significant Labs:  CBC:   Recent Labs   Lab 01/03/24  0825 01/04/24  0530   WBC 9.63 8.53   HGB 14.1 13.6*   HCT 40.9 40.8   PLT 88* 89*     CMP:   Recent Labs   Lab 01/04/24  0530      CALCIUM 9.1   ALBUMIN 2.5*   PROT 6.7      K 3.4*   CO2 27   CL 96   BUN 64*   CREATININE 1.7*   ALKPHOS 102   ALT 39   AST 41*   BILITOT 6.3*     Coagulation: No results for input(s): "PT", "INR", "APTT" in the last 48 hours.      Significant Imaging:  Imaging results within the past 24 hours have been reviewed.  "

## 2024-01-04 NOTE — ASSESSMENT & PLAN NOTE
Creatine stable for now. BMP reviewed- noted Estimated Creatinine Clearance: 57 mL/min (A) (based on SCr of 1.7 mg/dL (H)). according to latest data. Based on current GFR, CKD stage is stage 3 - GFR 30-59.  Monitor UOP and serial BMP and adjust therapy as needed. Renally dose meds. Avoid nephrotoxic medications and procedures.

## 2024-01-04 NOTE — PROGRESS NOTES
[facility] - Cardiology [facility] Medicine  Progress Note    Patient Name: Papito Bhakta  MRN: [id number]  Patient Class: IP- Inpatient   Admission Date: 12/29/2023  Length of Stay: 2 days  Attending Physician: Shell Medrano*  Primary Care Provider: Brynn To MD        Subjective:     Principal Problem:Acute on chronic combined systolic and diastolic CHF (congestive heart failure)        HPI:  Papito Bhakta is a 68 y.o. male with NICM, combined CHF(11/2023 EF 10 -15%, G3DD) s/p ICD placement, CKD, HLD, HTN, and AMELIA who presents for bilateral lower extremity swelling and shortness of breath. Patient reports he has been having worsening shortness of breath for the past 6 months. He had acutely worsening SOB today where he described becoming profoundly dyspneic on exertion. He reported taking 2 of his 80 mg Lasix this morning with subsequent minimal urine output and minimal improvement in his SOB. He reports SOB aggravated with lying down and he requires frequent positional changes to keep comfortable. He reports additional poor appetite and urine output for the past week although he reports he has been drinking well. He had 2 episodes of nausea/vomiting this past week as well. He denies fever/chills, chest pain, abdominal pain, recent illness, medication changes. Patient reports adherence with all medications. He reports he lives with his daughter who helps him with his medications and healthcare.    Additionally he reports chronic leg pain from a chronic RLE wound. He went to wound care yesterday where dressings were changed and he was told they were healing well. He has bilateral lower extremity edema R>L that is typical for him and he denies recent worsening.    Overview/Hospital Course:  Pt admitted to hospital medicine for acute heart failure exacerbation. He was diuresed with IV lasix 80 IV BID initially and required increased frequency to TID. Also complaining of inability to swallow for  3 months duration. SLP evaluated the patient and determined that he could tolerate liquids. GI has been consulted, planning for EGD. New leukocytosis noted 12/31, work up significant for COVID positive. Started remdesivir, steroids held in order to prevent worsening fluid retention. Overnight 12/31, noted to be tachycardic and hypotensive (asymptomatic). Cardiology called, thought to be AF RVR and po amio transitioned to amio gtt briefly before resuming oral amiodarone. EP interrogated device. Patient noted to have chronic bilirubinemia, likely secondary to congestive hepatopathy- hemolysis labs negative, recommending outpatient follow-up.     Interval History: No acute events overnight. Patient maintained a regular heart rhythm overnight with rates in the 70-80s. With increased IV lasix 80 mg TID he had increased urine output over past 24 hours. Will continue TID dosing as he still appears hypervolemic on physical exam. Labs notable for decrease in platelets, elevated bilirubin, and potassium of 3.4. Platelets and bilirubinemia appear chronic. Potassium repleted with 80 mEq and repeat BMP ordered in the afternoon to insure correction with increased lasix dosing. Patients sCr improving and is now at 1.7.GI planning EGD for tomorrow to investigate etiology of dysphagia, patient continuing on clear liquid diet today, and orders are placed for NPO at midnight for procedure.    Review of Systems   HENT:  Positive for trouble swallowing.    Respiratory:  Positive for cough and shortness of breath.      Objective:     Vital Signs (Most Recent):  Temp: 98.2 °F (36.8 °C) (01/04/24 1102)  Pulse: 75 (01/04/24 1118)  Resp: 20 (01/04/24 1102)  BP: 100/72 (01/04/24 1102)  SpO2: 100 % (01/04/24 1102) Vital Signs (24h Range):  Temp:  [97.2 °F (36.2 °C)-98.5 °F (36.9 °C)] 98.2 °F (36.8 °C)  Pulse:  [69-87] 75  Resp:  [18-20] 20  SpO2:  [95 %-100 %] 100 %  BP: ()/(65-72) 100/72     Weight: 108.6 kg (239 lb 6.7 oz)  Body mass  index is 28.39 kg/m².    Intake/Output Summary (Last 24 hours) at 1/4/2024 1145  Last data filed at 1/4/2024 0850  Gross per 24 hour   Intake 360 ml   Output 1950 ml   Net -1590 ml         Physical Exam  Vitals and nursing note reviewed.   Constitutional:       Appearance: He is ill-appearing.   HENT:      Head: Normocephalic and atraumatic.      Mouth/Throat:      Mouth: Mucous membranes are moist.   Eyes:      General: Scleral icterus present.      Extraocular Movements: Extraocular movements intact.      Comments: Anisocoria   Cardiovascular:      Rate and Rhythm: Normal rate and regular rhythm.      Heart sounds:      Gallop present.      Comments: JVD, JVP to level of mandible at 45 degrees.   Pulmonary:      Effort: Pulmonary effort is normal. No respiratory distress.      Breath sounds: Normal breath sounds.      Comments: Crackles bilaterally. Patient on 1L NC with saturation 93%.  Abdominal:      General: Abdomen is flat. There is no distension.      Palpations: Abdomen is soft.      Tenderness: There is no abdominal tenderness.   Musculoskeletal:         General: Tenderness (LLE tenderness) present.      Right lower leg: Edema present.      Left lower leg: Edema present.      Comments: Anasarca, wound dressings on RLE and LLE  +1 pitting edema on posterior thighs  +2 pitting edema B/L LE to mid shin   Skin:     General: Skin is warm and dry.   Neurological:      Mental Status: He is alert and oriented to person, place, and time.      Motor: Weakness present.   Psychiatric:         Mood and Affect: Mood normal.         Behavior: Behavior normal.             Significant Labs: All pertinent labs within the past 24 hours have been reviewed.  CBC:   Recent Labs   Lab 01/03/24  0825 01/04/24  0530   WBC 9.63 8.53   HGB 14.1 13.6*   HCT 40.9 40.8   PLT 88* 89*     CMP:   Recent Labs   Lab 01/03/24  0825 01/04/24  0530    138   K 3.7 3.4*   CL 97 96   CO2 23 27   * 104   BUN 68* 64*   CREATININE 1.9*  1.7*   CALCIUM 9.7 9.1   PROT 6.9 6.7   ALBUMIN 2.6* 2.5*   BILITOT 5.3* 6.3*   ALKPHOS 96 102   AST 39 41*   ALT 41 39   ANIONGAP 17* 15     Magnesium:   Recent Labs   Lab 01/03/24  0825 01/04/24  0530   MG 2.2 2.2       Significant Imaging: I have reviewed all pertinent imaging results/findings within the past 24 hours.    Assessment/Plan:      * Acute on chronic combined systolic and diastolic CHF (congestive heart failure)  Patient is identified as having Combined Systolic and Diastolic heart failure that is Acute on chronic. CHF is currently uncontrolled due to Pulmonary edema/pleural effusion on CXR. Latest ECHO performed and demonstrates- Results for orders placed during the hospital encounter of 11/10/23    Echo    Interpretation Summary    Left Ventricle: The left ventricle is severely dilated. Mildly increased wall thickness. There is mild concentric hypertrophy. Severe global hypokinesis present. There is severely reduced systolic function with a visually estimated ejection fraction of 10 -15%. Grade III diastolic dysfunction.    Right Ventricle: Severe right ventricular enlargement. Systolic function is severely reduced.    Mitral Valve: There is no stenosis. There is mild to moderate regurgitation with a centrally directed jet.    Tricuspid Valve: There is mild to moderate regurgitation.    Pulmonary Artery: The estimated pulmonary artery systolic pressure is 67 mmHg.  .Last BNP reviewed- and noted below   Recent Labs   Lab 01/01/24  0551   BNP 2,684*         Presented for acute on chronic SOB with associated low UOP. Afebrile HDS. BNP 3,067, Troponin 0.031. CXR with cardiomegaly, vascular congestion, and edema. Received 100 of Lasix in the ED.    - RIP positive for Covid  - IV lasix 80 TID.   - continue home Jardiance   - Beta blocker/aldactone held given soft pressures, resume when appropriate  - Cardiac diet with Fluid restriction at 1.5L with strict I/Os and daily STANDING weights  - Check  Electrolytes, keep Mag >2 & K+ >4  - SCDs, Ambulate as tolerated    - Strict I&Os, Daily standing weights  - Review Low-salt diet and enforce medication adherence on discharge    CKD (chronic kidney disease)  Creatine stable for now. BMP reviewed- noted Estimated Creatinine Clearance: 57 mL/min (A) (based on SCr of 1.7 mg/dL (H)). according to latest data. Based on current GFR, CKD stage is stage 3 - GFR 30-59.  Monitor UOP and serial BMP and adjust therapy as needed. Renally dose meds. Avoid nephrotoxic medications and procedures.    COVID-19  COVID positive, noted 12/31    - Finished remdesivir course yesterday  - Hold steroids at this time, hesitant to exacerbate fluid retention     Microcytic anemia  Iron studies notable for Fe 26 and sat iron 8%. TIBC, ferritin, transferrin wnl. Likely iron deficiency anemia.    - Daily iron tablet  - Daily CBCs  - Consider IV iron supplementation     Dysphagia  Reports a 3 month history of inability to tolerate solid foods. SLP has assessed him, can tolerate liquids.     - GI consulted, planning for EGD Friday  - Adding boost to meals after EGD, currently on clear liquid diet      Pupil asymmetry  Notable asymetry on pupils by patient. Chart review shows left orbital trauma in 2021 with notes concerned for dilation and vision changes. When talking to daughter, this is chronic. States no vision changes or neuro symptoms whatsoever.     Serum total bilirubin elevated  Direct bili 4.5. No signs of hemolysis.    - Daily CMPs  - Appears chronic since November. Previously reported as secondary to congestive hepatopathy however AST only mildly elevated and LFTS close to normal limits.  - US Liver with doppler showing: Bidirectional portal vein flow may be seen with right heart failure, tricuspid regurgitation, or portal hypertension. Evidence of volume overload with distended IVC and hepatic veins.  Pulsatile flow through the hepatic veins (also possibly suggesting tricuspid  regurgitation). Suspected hepatic steatosis. No evidence of biliary obstruction.      Nausea and vomiting  Complaints of several day history of N/V on admission. Also noted 3 month history of difficulty swallowing solid foods.    - Consulted SLP, able to tolerate liquids  - Consulted GI, planning for EGD Friday  - Continuing clear liquid diet as per SLP      Acute renal failure superimposed on stage 3a chronic kidney disease  Creatinine 2.1 on admit, baseline around 1.4. Likely prerenal etiology given urine sodium and FENa vs progression of CKD. Given stability of Cr, likely new baseline.   Recent Labs     01/02/24  0406 01/03/24  0825 01/04/24  0530   BUN 65* 68* 64*   CREATININE 1.9* 1.9* 1.7*         Estimated Creatinine Clearance: 57 mL/min (A) (based on SCr of 1.7 mg/dL (H)).  Improving    - Renal US: renal cysts, no hydronephrosis  - Urine Na: 14, Pr/Cr: 0.72; FENa 0.2%  - Monitor urine output and serial BMP and adjust therapy as needed.   - Strict I&Os and daily weights   - Avoid nephrotoxic agents such as NSAIDs, gadolinium and IV radiocontrast.  - Renally dose meds to current GFR.  - Maintain MAP > 65.  - Nephrology referral outpatient    AMELIA (obstructive sleep apnea)  Carried diagnosis of AMELIA per chart, however he does not sleep with CPAP at home and declines while here      NICM (nonischemic cardiomyopathy)  ASA 325mg qd per home medication list. Reason for this dose unclear.    - Restarting home amio       Biventricular ICD (implantable cardioverter-defibrillator) in place  Stable on admission, no acute issues, he denies discharge. QTc chronically prolonged. Continue home amiodarone for NSVT prevention. Monitor on telemetry     - 12/31, noted to have AF RVR with hypotension. Started on amio gtt. Per report, pacemaker malfunctioning. EP consulted for formal evaluation   - Restarted on home oral amio  - Device interrogated, EP has signed off    Hyperlipidemia  Stable. Continue Home  Pravastatin.        Essential hypertension  Chronic, controlled. Latest blood pressure and vitals reviewed-     Temp:  [97.2 °F (36.2 °C)-98.5 °F (36.9 °C)]   Pulse:  [69-87]   Resp:  [18-20]   BP: ()/(65-72)   SpO2:  [95 %-100 %] .   Home meds for hypertension were reviewed and noted below.   Hypertension Medications               furosemide (LASIX) 80 MG tablet TAKE 1 TABLET EVERY DAY    metoprolol succinate (TOPROL-XL) 50 MG 24 hr tablet TAKE 1 TABLET EVERY DAY    spironolactone (ALDACTONE) 25 MG tablet TAKE 1/2 TABLET (12.5 MG TOTAL) ONCE DAILY. HOLD IF SYSTOLIC BLOOD PRESSURE IS LESS THAN 110            While in the hospital, will manage blood pressure as follows; Adjust home antihypertensive regimen as follows- Holding in setting of borderline pressures in setting of new diuresis     Will utilize p.r.n. blood pressure medication only if patient's blood pressure greater than 180/110 and he develops symptoms such as worsening chest pain or shortness of breath.      VTE Risk Mitigation (From admission, onward)           Ordered     heparin (porcine) injection 5,000 Units  Every 8 hours         12/29/23 1759     IP VTE HIGH RISK PATIENT  Once         12/29/23 1759     Place sequential compression device  Until discontinued         12/29/23 1759                    Discharge Planning   MARIIA: 1/5/2024     Code Status: Full Code   Is the patient medically ready for discharge?: No    Reason for patient still in hospital (select all that apply): Treatment  Discharge Plan A: Home with family                  Rob Trujillo MD  Department of Hospital Medicine   [facility] - Cardiology Stepdown

## 2024-01-04 NOTE — PROGRESS NOTES
[facility] - Cardiology Stepdown  Gastroenterology  Progress Note    Patient Name: Papito Bhakta  MRN: [id number]  Admission Date: 12/29/2023  Hospital Length of Stay: 2 days  Code Status: Full Code   Attending Provider: Shell Medrano*  Consulting Provider: Rossi Maya NP  Primary Care Physician: Brynn To MD  Principal Problem: Acute on chronic combined systolic and diastolic CHF (congestive heart failure)    Subjective:     Interval History: Followed up with patient for consideration of EGD due to his ongoing dysphagia. Tolerating thin liquids per SLP. Vitals stable overnight. Episode of questionable A.Fib vs. Paced ventricular tachycardia. Device interrogation scheduled for today. EGD planned for tomorrow.     Review of Systems   Constitutional:  Negative for activity change, appetite change, fatigue and fever.   HENT:  Positive for trouble swallowing. Negative for sore throat.    Gastrointestinal:  Negative for abdominal distention, abdominal pain, anal bleeding, blood in stool, constipation, diarrhea, nausea, rectal pain and vomiting.   Skin:  Negative for color change and pallor.   Neurological:  Negative for dizziness, syncope and weakness.     Objective:     Vital Signs (Most Recent):  Temp: 97.6 °F (36.4 °C) (01/04/24 0750)  Pulse: 70 (01/04/24 0750)  Resp: 18 (01/04/24 0750)  BP: 103/69 (01/04/24 0750)  SpO2: 95 % (01/04/24 0750) Vital Signs (24h Range):  Temp:  [97.2 °F (36.2 °C)-98.5 °F (36.9 °C)] 97.6 °F (36.4 °C)  Pulse:  [66-87] 70  Resp:  [18-20] 18  SpO2:  [95 %-100 %] 95 %  BP: ()/(64-71) 103/69     Weight: 108.6 kg (239 lb 6.7 oz) (01/04/24 0520)  Body mass index is 28.39 kg/m².      Intake/Output Summary (Last 24 hours) at 1/4/2024 0963  Last data filed at 1/4/2024 0445  Gross per 24 hour   Intake 360 ml   Output 1650 ml   Net -1290 ml       Lines/Drains/Airways       Peripheral Intravenous Line  Duration                  Peripheral IV - Single Lumen 12/31/23 1752 20 G  "Left;Posterior Forearm 3 days                     Physical Exam  Vitals reviewed.   Constitutional:       Appearance: He is normal weight. He is not ill-appearing.   HENT:      Mouth/Throat:      Mouth: Mucous membranes are dry.      Pharynx: Oropharynx is clear.   Eyes:      General: No scleral icterus.  Abdominal:      General: Bowel sounds are normal. There is no distension.      Palpations: Abdomen is soft. There is no mass.   Skin:     General: Skin is warm and dry.      Capillary Refill: Capillary refill takes less than 2 seconds.      Coloration: Skin is not jaundiced or pale.   Neurological:      Mental Status: He is alert and oriented to person, place, and time. Mental status is at baseline.          Significant Labs:  CBC:   Recent Labs   Lab 01/03/24  0825 01/04/24  0530   WBC 9.63 8.53   HGB 14.1 13.6*   HCT 40.9 40.8   PLT 88* 89*     CMP:   Recent Labs   Lab 01/04/24  0530      CALCIUM 9.1   ALBUMIN 2.5*   PROT 6.7      K 3.4*   CO2 27   CL 96   BUN 64*   CREATININE 1.7*   ALKPHOS 102   ALT 39   AST 41*   BILITOT 6.3*     Coagulation: No results for input(s): "PT", "INR", "APTT" in the last 48 hours.      Significant Imaging:  Imaging results within the past 24 hours have been reviewed.  Assessment/Plan:     GI  Dysphagia  The patient is being consulted to gastroenterology for dysphagia to solids from last 3 months and now started having dysphagia with liquids.  Dysphagia is associated with nausea, vomiting and weight loss  During this hospitalization he is being treated for CHF exacerbation  Hbg trend 13.5, 13.8 BUN 69, 64. Bilirubin 5.8, AST 53, ALT 37.     Recommendations  - EGD planned for tomorrow as long as patient remains hemodynamically stable.  - NPO after midnight.        Thank you for your consult. I will follow-up with patient. Please contact us if you have any additional questions.    oRssi Maya NP  Gastroenterology  [facility]  "

## 2024-01-04 NOTE — ASSESSMENT & PLAN NOTE
Patient is identified as having Combined Systolic and Diastolic heart failure that is Acute on chronic. CHF is currently uncontrolled due to Pulmonary edema/pleural effusion on CXR. Latest ECHO performed and demonstrates- Results for orders placed during the hospital encounter of 11/10/23    Echo    Interpretation Summary    Left Ventricle: The left ventricle is severely dilated. Mildly increased wall thickness. There is mild concentric hypertrophy. Severe global hypokinesis present. There is severely reduced systolic function with a visually estimated ejection fraction of 10 -15%. Grade III diastolic dysfunction.    Right Ventricle: Severe right ventricular enlargement. Systolic function is severely reduced.    Mitral Valve: There is no stenosis. There is mild to moderate regurgitation with a centrally directed jet.    Tricuspid Valve: There is mild to moderate regurgitation.    Pulmonary Artery: The estimated pulmonary artery systolic pressure is 67 mmHg.  .Last BNP reviewed- and noted below   Recent Labs   Lab 01/01/24  0551   BNP 2,684*         Presented for acute on chronic SOB with associated low UOP. Afebrile HDS. BNP 3,067, Troponin 0.031. CXR with cardiomegaly, vascular congestion, and edema. Received 100 of Lasix in the ED.    - RIP positive for Covid  - IV lasix 80 TID.   - continue home Jardiance   - Beta blocker/aldactone held given soft pressures, resume when appropriate  - Cardiac diet with Fluid restriction at 1.5L with strict I/Os and daily STANDING weights  - Check Electrolytes, keep Mag >2 & K+ >4  - SCDs, Ambulate as tolerated    - Strict I&Os, Daily standing weights  - Review Low-salt diet and enforce medication adherence on discharge

## 2024-01-04 NOTE — PT/OT/SLP PROGRESS
Speech Language Pathology Treatment    Patient Name:  Papito Bhakta   MRN:  [id number]  Admitting Diagnosis: Acute on chronic combined systolic and diastolic CHF (congestive heart failure)    Recommendations:                 General Recommendations:  Dysphagia therapy  Diet recommendations:    Pt demonstrates oropharyngeal swallow function for support of a regular diet and thin liquids  However, given pt's preference of thin liquids due to ease of swallowing thins as well as high degree of esophageal-based complaints, pt would continue to benefit from full liquid diet  Consider oral nutritional supplements such as Boost/Ensure as apprropiate per medical team given concern for poor ability to meet nutritional needs on full liquid diet  Aspiration Precautions: Standard aspiration precautions   General Precautions: Standard, fall  Communication strategies:  none    Assessment:     Papito Bhakta is a 68 y.o. male with an SLP diagnosis of esophageal Dysphagia. He presents with ongoing need for esophageal work up. SLP to continue to follow.     Subjective     Spoke with nursing prior to session. Pt found resting in bed upon SLP entry into room.     Patient goals: to go home     Pain/Comfort:  Pain Rating 1: 0/10    Respiratory Status: Nasal cannula, flow 1 L/min    Objective:     Has the patient been evaluated by SLP for swallowing?   Yes  Keep patient NPO? No   Current Respiratory Status:        Pt seen for ongoing dysphagia therapy. Pt reported adequate tolerance of thin liquids from full liquid tray though endorsed minimal PO intake of purees including yogurt, grits, and cream of wheat. Pt verbalized frustration with current GI-based dysfunction and benefited from active listening. Discussed options for diet consistencies including remaining with full liquid diet to offer both thin and naturally pureed textures vs upgrading to regular solids within liquids and self-selection of foods. Reviewed pros and cons of upgrade  or remaining on current diet with ultimate decision to continue with full liquid diet. Discussed possibility of introduction of oral nutritional supplements such as Boost/Ensure per medical team discretion and pt stated he would be amenable to protein shakes. Pt ultimately verbalized understanding to all aspects of skilled speech education and had no additional questions or concerns upon SLP exit.      Goals:   Multidisciplinary Problems       SLP Goals          Problem: SLP    Goal Priority Disciplines Outcome   SLP Goal     SLP Ongoing, Progressing   Description: Speech Language Pathology Goals  Goals expected to be met by 1/6 1. Pt will participate in ongoing swallow assessment                               Plan:     Patient to be seen:  3 x/week   Plan of Care expires:     Plan of Care reviewed with:  patient   SLP Follow-Up:  Yes       Discharge recommendations:   (TBD)   Barriers to Discharge:  Level of Skilled Assistance Needed      Time Tracking:     SLP Treatment Date:   01/04/24  Speech Start Time:  1010  Speech Stop Time:  1022     Speech Total Time (min):  12 min    Billable Minutes: Self Care/Home Management Training 12      01/04/2024

## 2024-01-04 NOTE — SUBJECTIVE & OBJECTIVE
Interval History: No acute events overnight. Patient maintained a regular heart rhythm overnight with rates in the 70-80s. With increased IV lasix 80 mg TID he had increased urine output over past 24 hours. Will continue TID dosing as he still appears hypervolemic on physical exam. Labs notable for decrease in platelets, elevated bilirubin, and potassium of 3.4. Platelets and bilirubinemia appear chronic. Potassium repleted with 80 mEq and repeat BMP ordered in the afternoon to insure correction with increased lasix dosing. Patients sCr improving and is now at 1.7.GI planning EGD for tomorrow to investigate etiology of dysphagia, patient continuing on clear liquid diet today, and orders are placed for NPO at midnight for procedure.    Review of Systems   HENT:  Positive for trouble swallowing.    Respiratory:  Positive for cough and shortness of breath.      Objective:     Vital Signs (Most Recent):  Temp: 98.2 °F (36.8 °C) (01/04/24 1102)  Pulse: 75 (01/04/24 1118)  Resp: 20 (01/04/24 1102)  BP: 100/72 (01/04/24 1102)  SpO2: 100 % (01/04/24 1102) Vital Signs (24h Range):  Temp:  [97.2 °F (36.2 °C)-98.5 °F (36.9 °C)] 98.2 °F (36.8 °C)  Pulse:  [69-87] 75  Resp:  [18-20] 20  SpO2:  [95 %-100 %] 100 %  BP: ()/(65-72) 100/72     Weight: 108.6 kg (239 lb 6.7 oz)  Body mass index is 28.39 kg/m².    Intake/Output Summary (Last 24 hours) at 1/4/2024 1145  Last data filed at 1/4/2024 0850  Gross per 24 hour   Intake 360 ml   Output 1950 ml   Net -1590 ml         Physical Exam  Vitals and nursing note reviewed.   Constitutional:       Appearance: He is ill-appearing.   HENT:      Head: Normocephalic and atraumatic.      Mouth/Throat:      Mouth: Mucous membranes are moist.   Eyes:      General: Scleral icterus present.      Extraocular Movements: Extraocular movements intact.      Comments: Anisocoria   Cardiovascular:      Rate and Rhythm: Normal rate and regular rhythm.      Heart sounds:      Gallop present.       Comments: JVD, JVP to level of mandible at 45 degrees.   Pulmonary:      Effort: Pulmonary effort is normal. No respiratory distress.      Breath sounds: Normal breath sounds.      Comments: Crackles bilaterally. Patient on 1L NC with saturation 93%.  Abdominal:      General: Abdomen is flat. There is no distension.      Palpations: Abdomen is soft.      Tenderness: There is no abdominal tenderness.   Musculoskeletal:         General: Tenderness (LLE tenderness) present.      Right lower leg: Edema present.      Left lower leg: Edema present.      Comments: Anasarca, wound dressings on RLE and LLE  +1 pitting edema on posterior thighs  +2 pitting edema B/L LE to mid shin   Skin:     General: Skin is warm and dry.   Neurological:      Mental Status: He is alert and oriented to person, place, and time.      Motor: Weakness present.   Psychiatric:         Mood and Affect: Mood normal.         Behavior: Behavior normal.             Significant Labs: All pertinent labs within the past 24 hours have been reviewed.  CBC:   Recent Labs   Lab 01/03/24  0825 01/04/24  0530   WBC 9.63 8.53   HGB 14.1 13.6*   HCT 40.9 40.8   PLT 88* 89*     CMP:   Recent Labs   Lab 01/03/24  0825 01/04/24  0530    138   K 3.7 3.4*   CL 97 96   CO2 23 27   * 104   BUN 68* 64*   CREATININE 1.9* 1.7*   CALCIUM 9.7 9.1   PROT 6.9 6.7   ALBUMIN 2.6* 2.5*   BILITOT 5.3* 6.3*   ALKPHOS 96 102   AST 39 41*   ALT 41 39   ANIONGAP 17* 15     Magnesium:   Recent Labs   Lab 01/03/24  0825 01/04/24  0530   MG 2.2 2.2       Significant Imaging: I have reviewed all pertinent imaging results/findings within the past 24 hours.

## 2024-01-04 NOTE — ASSESSMENT & PLAN NOTE
Chronic, controlled. Latest blood pressure and vitals reviewed-     Temp:  [97.2 °F (36.2 °C)-98.5 °F (36.9 °C)]   Pulse:  [69-87]   Resp:  [18-20]   BP: ()/(65-72)   SpO2:  [95 %-100 %] .   Home meds for hypertension were reviewed and noted below.   Hypertension Medications               furosemide (LASIX) 80 MG tablet TAKE 1 TABLET EVERY DAY    metoprolol succinate (TOPROL-XL) 50 MG 24 hr tablet TAKE 1 TABLET EVERY DAY    spironolactone (ALDACTONE) 25 MG tablet TAKE 1/2 TABLET (12.5 MG TOTAL) ONCE DAILY. HOLD IF SYSTOLIC BLOOD PRESSURE IS LESS THAN 110            While in the hospital, will manage blood pressure as follows; Adjust home antihypertensive regimen as follows- Holding in setting of borderline pressures in setting of new diuresis     Will utilize p.r.n. blood pressure medication only if patient's blood pressure greater than 180/110 and he develops symptoms such as worsening chest pain or shortness of breath.

## 2024-01-04 NOTE — TELEPHONE ENCOUNTER
"----- Message from Gi Phan sent at 2024  9:04 AM CST -----  Regarding: FW: Colonoscopy    ----- Message -----  From: Sarah Dougherty DO  Sent: 2023   2:20 PM CST  To: [facility]  Subject: Colonoscopy                                      Procedure: Colonoscopy    Diagnosis: Surveillance colonoscopy - Hx of colon polyps    Procedure Timin-12 weeks    #If within 4 weeks selected, please stephanie as high priority#    #If greater than 12 weeks, please select "5-12 weeks" and delay sending until 2 months prior to requested date#     Provider: Any GI provider    Location: [address]    Additional Scheduling Information: AICD in place and Diabetes    Prep Specifications:Extended/Constipation prep    Is the patient taking a GLP-1 Agonist:no    Have you attached a patient to this message: yes        "

## 2024-01-05 LAB
ALBUMIN SERPL BCP-MCNC: 2.4 G/DL (ref 3.5–5.2)
ALP SERPL-CCNC: 104 U/L (ref 55–135)
ALT SERPL W/O P-5'-P-CCNC: 34 U/L (ref 10–44)
ANION GAP SERPL CALC-SCNC: 13 MMOL/L (ref 8–16)
ANION GAP SERPL CALC-SCNC: 15 MMOL/L (ref 8–16)
AST SERPL-CCNC: 40 U/L (ref 10–40)
BACTERIA BLD CULT: NORMAL
BACTERIA BLD CULT: NORMAL
BILIRUB SERPL-MCNC: 6.8 MG/DL (ref 0.1–1)
BUN SERPL-MCNC: 57 MG/DL (ref 8–23)
BUN SERPL-MCNC: 60 MG/DL (ref 8–23)
CALCIUM SERPL-MCNC: 8.9 MG/DL (ref 8.7–10.5)
CALCIUM SERPL-MCNC: 9 MG/DL (ref 8.7–10.5)
CHLORIDE SERPL-SCNC: 100 MMOL/L (ref 95–110)
CHLORIDE SERPL-SCNC: 98 MMOL/L (ref 95–110)
CO2 SERPL-SCNC: 25 MMOL/L (ref 23–29)
CO2 SERPL-SCNC: 25 MMOL/L (ref 23–29)
CREAT SERPL-MCNC: 1.6 MG/DL (ref 0.5–1.4)
CREAT SERPL-MCNC: 1.7 MG/DL (ref 0.5–1.4)
ERYTHROCYTE [DISTWIDTH] IN BLOOD BY AUTOMATED COUNT: 21.6 % (ref 11.5–14.5)
EST. GFR  (NO RACE VARIABLE): 43.4 ML/MIN/1.73 M^2
EST. GFR  (NO RACE VARIABLE): 46.6 ML/MIN/1.73 M^2
GLUCOSE SERPL-MCNC: 116 MG/DL (ref 70–110)
GLUCOSE SERPL-MCNC: 93 MG/DL (ref 70–110)
HCT VFR BLD AUTO: 38.7 % (ref 40–54)
HGB BLD-MCNC: 13.4 G/DL (ref 14–18)
MAGNESIUM SERPL-MCNC: 2.2 MG/DL (ref 1.6–2.6)
MCH RBC QN AUTO: 23.6 PG (ref 27–31)
MCHC RBC AUTO-ENTMCNC: 34.6 G/DL (ref 32–36)
MCV RBC AUTO: 68 FL (ref 82–98)
PHOSPHATE SERPL-MCNC: 2.2 MG/DL (ref 2.7–4.5)
PLATELET # BLD AUTO: 82 K/UL (ref 150–450)
PMV BLD AUTO: ABNORMAL FL (ref 9.2–12.9)
POTASSIUM SERPL-SCNC: 3.3 MMOL/L (ref 3.5–5.1)
POTASSIUM SERPL-SCNC: 3.9 MMOL/L (ref 3.5–5.1)
PROT SERPL-MCNC: 6.5 G/DL (ref 6–8.4)
RBC # BLD AUTO: 5.67 M/UL (ref 4.6–6.2)
SODIUM SERPL-SCNC: 138 MMOL/L (ref 136–145)
SODIUM SERPL-SCNC: 138 MMOL/L (ref 136–145)
WBC # BLD AUTO: 9 K/UL (ref 3.9–12.7)

## 2024-01-05 PROCEDURE — 27000207 HC ISOLATION

## 2024-01-05 PROCEDURE — 80048 BASIC METABOLIC PNL TOTAL CA: CPT | Mod: XB | Performed by: HOSPITALIST

## 2024-01-05 PROCEDURE — 63600175 PHARM REV CODE 636 W HCPCS

## 2024-01-05 PROCEDURE — 20600001 HC STEP DOWN PRIVATE ROOM

## 2024-01-05 PROCEDURE — 85027 COMPLETE CBC AUTOMATED: CPT | Performed by: STUDENT IN AN ORGANIZED HEALTH CARE EDUCATION/TRAINING PROGRAM

## 2024-01-05 PROCEDURE — 25000003 PHARM REV CODE 250: Performed by: STUDENT IN AN ORGANIZED HEALTH CARE EDUCATION/TRAINING PROGRAM

## 2024-01-05 PROCEDURE — 36415 COLL VENOUS BLD VENIPUNCTURE: CPT | Performed by: STUDENT IN AN ORGANIZED HEALTH CARE EDUCATION/TRAINING PROGRAM

## 2024-01-05 PROCEDURE — 25000003 PHARM REV CODE 250

## 2024-01-05 PROCEDURE — 84100 ASSAY OF PHOSPHORUS: CPT | Performed by: STUDENT IN AN ORGANIZED HEALTH CARE EDUCATION/TRAINING PROGRAM

## 2024-01-05 PROCEDURE — 63600175 PHARM REV CODE 636 W HCPCS: Performed by: HOSPITALIST

## 2024-01-05 PROCEDURE — 80053 COMPREHEN METABOLIC PANEL: CPT | Performed by: STUDENT IN AN ORGANIZED HEALTH CARE EDUCATION/TRAINING PROGRAM

## 2024-01-05 PROCEDURE — 25000242 PHARM REV CODE 250 ALT 637 W/ HCPCS

## 2024-01-05 PROCEDURE — 36415 COLL VENOUS BLD VENIPUNCTURE: CPT | Mod: XB | Performed by: HOSPITALIST

## 2024-01-05 PROCEDURE — 63600175 PHARM REV CODE 636 W HCPCS: Performed by: STUDENT IN AN ORGANIZED HEALTH CARE EDUCATION/TRAINING PROGRAM

## 2024-01-05 PROCEDURE — 83735 ASSAY OF MAGNESIUM: CPT | Performed by: STUDENT IN AN ORGANIZED HEALTH CARE EDUCATION/TRAINING PROGRAM

## 2024-01-05 RX ORDER — HALOPERIDOL 5 MG/ML
0.5 INJECTION INTRAMUSCULAR EVERY 10 MIN PRN
Status: CANCELLED | OUTPATIENT
Start: 2024-01-05

## 2024-01-05 RX ORDER — MUPIROCIN 20 MG/G
OINTMENT TOPICAL 2 TIMES DAILY
Status: DISCONTINUED | OUTPATIENT
Start: 2024-01-05 | End: 2024-01-10

## 2024-01-05 RX ORDER — GUAIFENESIN 100 MG/5ML
200 SOLUTION ORAL EVERY 4 HOURS PRN
Status: DISCONTINUED | OUTPATIENT
Start: 2024-01-05 | End: 2024-01-18 | Stop reason: HOSPADM

## 2024-01-05 RX ORDER — POTASSIUM CHLORIDE 7.45 MG/ML
10 INJECTION INTRAVENOUS
Status: DISPENSED | OUTPATIENT
Start: 2024-01-05 | End: 2024-01-05

## 2024-01-05 RX ORDER — SODIUM CHLORIDE 0.9 % (FLUSH) 0.9 %
10 SYRINGE (ML) INJECTION
Status: CANCELLED | OUTPATIENT
Start: 2024-01-05

## 2024-01-05 RX ADMIN — HEPARIN SODIUM 5000 UNITS: 5000 INJECTION INTRAVENOUS; SUBCUTANEOUS at 10:01

## 2024-01-05 RX ADMIN — AMIODARONE HYDROCHLORIDE 200 MG: 200 TABLET ORAL at 09:01

## 2024-01-05 RX ADMIN — POTASSIUM CHLORIDE 10 MEQ: 7.46 INJECTION, SOLUTION INTRAVENOUS at 10:01

## 2024-01-05 RX ADMIN — HEPARIN SODIUM 5000 UNITS: 5000 INJECTION INTRAVENOUS; SUBCUTANEOUS at 03:01

## 2024-01-05 RX ADMIN — SENNOSIDES AND DOCUSATE SODIUM 1 TABLET: 8.6; 5 TABLET ORAL at 09:01

## 2024-01-05 RX ADMIN — PRAVASTATIN SODIUM 80 MG: 40 TABLET ORAL at 09:01

## 2024-01-05 RX ADMIN — FUROSEMIDE 15 MG/HR: 10 INJECTION, SOLUTION INTRAMUSCULAR; INTRAVENOUS at 04:01

## 2024-01-05 RX ADMIN — POTASSIUM PHOSPHATE, MONOBASIC AND POTASSIUM PHOSPHATE, DIBASIC 15 MMOL: 224; 236 INJECTION, SOLUTION, CONCENTRATE INTRAVENOUS at 01:01

## 2024-01-05 RX ADMIN — FUROSEMIDE 80 MG: 10 INJECTION, SOLUTION INTRAVENOUS at 09:01

## 2024-01-05 RX ADMIN — HEPARIN SODIUM 5000 UNITS: 5000 INJECTION INTRAVENOUS; SUBCUTANEOUS at 06:01

## 2024-01-05 RX ADMIN — POTASSIUM BICARBONATE 50 MEQ: 978 TABLET, EFFERVESCENT ORAL at 04:01

## 2024-01-05 RX ADMIN — GUAIFENESIN 200 MG: 200 SOLUTION ORAL at 10:01

## 2024-01-05 RX ADMIN — ASPIRIN 325 MG: 325 TABLET, COATED ORAL at 09:01

## 2024-01-05 RX ADMIN — EMPAGLIFLOZIN 10 MG: 10 TABLET, FILM COATED ORAL at 09:01

## 2024-01-05 NOTE — PROGRESS NOTES
[facility] - Cardiology [facility] Medicine  Progress Note    Patient Name: Papito Bhakta  MRN: [id number]  Patient Class: IP- Inpatient   Admission Date: 12/29/2023  Length of Stay: 3 days  Attending Physician: Shell Medrano*  Primary Care Provider: Brynn To MD        Subjective:     Principal Problem:Acute on chronic combined systolic and diastolic CHF (congestive heart failure)        HPI:  Papito Bhakta is a 68 y.o. male with NICM, combined CHF(11/2023 EF 10 -15%, G3DD) s/p ICD placement, CKD, HLD, HTN, and AMELIA who presents for bilateral lower extremity swelling and shortness of breath. Patient reports he has been having worsening shortness of breath for the past 6 months. He had acutely worsening SOB today where he described becoming profoundly dyspneic on exertion. He reported taking 2 of his 80 mg Lasix this morning with subsequent minimal urine output and minimal improvement in his SOB. He reports SOB aggravated with lying down and he requires frequent positional changes to keep comfortable. He reports additional poor appetite and urine output for the past week although he reports he has been drinking well. He had 2 episodes of nausea/vomiting this past week as well. He denies fever/chills, chest pain, abdominal pain, recent illness, medication changes. Patient reports adherence with all medications. He reports he lives with his daughter who helps him with his medications and healthcare.    Additionally he reports chronic leg pain from a chronic RLE wound. He went to wound care yesterday where dressings were changed and he was told they were healing well. He has bilateral lower extremity edema R>L that is typical for him and he denies recent worsening.    Overview/Hospital Course:  Pt admitted to hospital medicine for acute heart failure exacerbation. He was diuresed with IV lasix 80 IV BID initially and required increased frequency to TID. Also complaining of inability to swallow for  3 months duration. SLP evaluated the patient and determined that he could tolerate liquids. New leukocytosis noted 12/31, work up significant for COVID positive. Started remdesivir, steroids held in order to prevent worsening fluid retention. Overnight 12/31, noted to be tachycardic and hypotensive (asymptomatic). Cardiology called, thought to be AF RVR and po amio transitioned to amio gtt briefly before resuming oral amiodarone. EP interrogated device. Patient noted to have chronic bilirubinemia, likely secondary to congestive hepatopathy- hemolysis labs negative, recommending outpatient follow-up. GI was consulted for EGD but it is pending until he has been diuresed.     Interval History:  No acute events overnight.  Patient continued to maintain a regular rhythm.  Patient had urine output of 2100 mL, but still has some appearance of hypovolemia on physical examination.  We will continue t.i.d. Lasix dosing.  Labs continue to show his chronic thrombocytopenia and bilirubinemia.  Potassium was decreased at 3.3 and he was given some repletion via IV prior to his EGD.  EGD was scheduled for this afternoon with the GI, and complaining of very dry mouth and lips due to NPO. EGD canceled and GI signing off until patient has further diuresis and is euvolemic.     Review of Systems   HENT:  Positive for trouble swallowing.    Respiratory:  Positive for cough and shortness of breath.      Objective:     Vital Signs (Most Recent):  Temp: 98.6 °F (37 °C) (01/05/24 0821)  Pulse: 77 (01/05/24 0821)  Resp: 18 (01/05/24 0821)  BP: 108/73 (01/05/24 0821)  SpO2: 98 % (01/05/24 0821) Vital Signs (24h Range):  Temp:  [97.5 °F (36.4 °C)-98.6 °F (37 °C)] 98.6 °F (37 °C)  Pulse:  [68-89] 77  Resp:  [18-20] 18  SpO2:  [95 %-100 %] 98 %  BP: ()/(66-75) 108/73     Weight: 112.9 kg (248 lb 14.4 oz)  Body mass index is 29.52 kg/m².    Intake/Output Summary (Last 24 hours) at 1/5/2024 0832  Last data filed at 1/5/2024 0531  Gross per  24 hour   Intake 600 ml   Output 2100 ml   Net -1500 ml         Physical Exam  Vitals and nursing note reviewed.   Constitutional:       Appearance: He is ill-appearing.   HENT:      Head: Normocephalic and atraumatic.      Mouth/Throat:      Mouth: Mucous membranes are dry.      Comments: Cracked lips and dry oral mucose  Eyes:      General: Scleral icterus present.      Extraocular Movements: Extraocular movements intact.      Comments: Anisocoria   Cardiovascular:      Rate and Rhythm: Normal rate and regular rhythm.      Heart sounds:      Gallop present.      Comments: JVD, JVP to level of mandible at 45 degrees.   Pulmonary:      Effort: Pulmonary effort is normal. No respiratory distress.      Breath sounds: Normal breath sounds.      Comments: Crackles bilaterally. Patient off nasal cannula, on room air.  Abdominal:      General: Abdomen is flat. There is no distension.      Palpations: Abdomen is soft.      Tenderness: There is no abdominal tenderness.   Musculoskeletal:         General: Tenderness (LLE tenderness) present.      Right lower leg: Edema present.      Left lower leg: Edema present.      Comments: Anasarca, wound dressings on RLE and LLE  +2 pitting edema on posterior thighs  +1 pitting edema B/L LE to mid shin   Skin:     General: Skin is warm and dry.   Neurological:      Mental Status: He is alert and oriented to person, place, and time.      Motor: Weakness present.   Psychiatric:         Mood and Affect: Mood normal.         Behavior: Behavior normal.             Significant Labs: All pertinent labs within the past 24 hours have been reviewed.  CBC:   Recent Labs   Lab 01/04/24  0530 01/05/24  0526   WBC 8.53 9.00   HGB 13.6* 13.4*   HCT 40.8 38.7*   PLT 89* 82*     CMP:   Recent Labs   Lab 01/04/24  0530 01/04/24  1332 01/05/24  0525    137 138   K 3.4* 4.0 3.3*   CL 96 97 98   CO2 27 25 25    114* 93   BUN 64* 62* 60*   CREATININE 1.7* 1.5* 1.6*   CALCIUM 9.1 9.2 9.0   PROT  6.7  --  6.5   ALBUMIN 2.5*  --  2.4*   BILITOT 6.3*  --  6.8*   ALKPHOS 102  --  104   AST 41*  --  40   ALT 39  --  34   ANIONGAP 15 15 15     Magnesium:   Recent Labs   Lab 01/04/24  0530 01/05/24  0525   MG 2.2 2.2       Significant Imaging: I have reviewed all pertinent imaging results/findings within the past 24 hours.    Assessment/Plan:      * Acute on chronic combined systolic and diastolic CHF (congestive heart failure)  Patient is identified as having Combined Systolic and Diastolic heart failure that is Acute on chronic. CHF is currently uncontrolled due to Pulmonary edema/pleural effusion on CXR. Latest ECHO performed and demonstrates- Results for orders placed during the hospital encounter of 11/10/23    Echo    Interpretation Summary    Left Ventricle: The left ventricle is severely dilated. Mildly increased wall thickness. There is mild concentric hypertrophy. Severe global hypokinesis present. There is severely reduced systolic function with a visually estimated ejection fraction of 10 -15%. Grade III diastolic dysfunction.    Right Ventricle: Severe right ventricular enlargement. Systolic function is severely reduced.    Mitral Valve: There is no stenosis. There is mild to moderate regurgitation with a centrally directed jet.    Tricuspid Valve: There is mild to moderate regurgitation.    Pulmonary Artery: The estimated pulmonary artery systolic pressure is 67 mmHg.  .Last BNP reviewed- and noted below   Recent Labs   Lab 01/01/24  0551   BNP 2,684*         Presented for acute on chronic SOB with associated low UOP. Afebrile HDS. BNP 3,067, Troponin 0.031. CXR with cardiomegaly, vascular congestion, and edema. Received 100 of Lasix in the ED.    - RIP positive for Covid, treatment completed.  - Good response with IV lasix 80 TID, continuing diuresis   - continue home Jardiance   - Beta blocker/aldactone held given soft pressures, resume when appropriate  - Cardiac diet with Fluid restriction at  1.5L with strict I/Os and daily STANDING weights  - Check Electrolytes, keep Mag >2 & K+ >4  - SCDs, Ambulate as tolerated    - Strict I&Os, Daily standing weights  - Review Low-salt diet and enforce medication adherence on discharge    CKD (chronic kidney disease)  Creatine stable for now. BMP reviewed- noted Estimated Creatinine Clearance: 57 mL/min (A) (based on SCr of 1.7 mg/dL (H)). according to latest data. Based on current GFR, CKD stage is stage 3 - GFR 30-59.  Monitor UOP and serial BMP and adjust therapy as needed. Renally dose meds. Avoid nephrotoxic medications and procedures.    COVID-19  COVID positive, noted 12/31    - Finished remdesivir course yesterday  - Hold steroids at this time, hesitant to exacerbate fluid retention     Microcytic anemia  Iron studies notable for Fe 26 and sat iron 8%. TIBC, ferritin, transferrin wnl. Likely iron deficiency anemia.    - Daily iron tablet  - Daily CBCs  - Consider IV iron supplementation     Dysphagia  Reports a 3 month history of inability to tolerate solid foods. SLP has assessed him, can tolerate liquids.     - Adding boost to meals, currently on liquid diet  - GI cancelled EGD and signing off. Will re-consult once patient is more euvolemic and hemodynamically stable.       Pupil asymmetry  Notable asymetry on pupils by patient. Chart review shows left orbital trauma in 2021 with notes concerned for dilation and vision changes. When talking to daughter, this is chronic. States no vision changes or neuro symptoms whatsoever.     Serum total bilirubin elevated  Direct bili 4.5. No signs of hemolysis.    - Daily CMPs  - Appears chronic since November. Previously reported as secondary to congestive hepatopathy however AST only mildly elevated and LFTS close to normal limits.  - US Liver with doppler showing: Bidirectional portal vein flow may be seen with right heart failure, tricuspid regurgitation, or portal hypertension. Evidence of volume overload with  distended IVC and hepatic veins.  Pulsatile flow through the hepatic veins (also possibly suggesting tricuspid regurgitation). Suspected hepatic steatosis. No evidence of biliary obstruction.      Nausea and vomiting  Complaints of several day history of N/V on admission. Also noted 3 month history of difficulty swallowing solid foods.    - Consulted SLP, able to tolerate liquids  - GI cancelled EGD and signing off. Will re-consult once patient is more euvolemic and hemodynamically stable.   - Continuing liquid diet as per SLP      Acute renal failure superimposed on stage 3a chronic kidney disease  Creatinine 2.1 on admit, baseline around 1.4. Likely prerenal etiology given urine sodium and FENa vs progression of CKD. Given stability of Cr, likely new baseline.   Recent Labs     01/02/24  0406 01/03/24  0825 01/04/24  0530   BUN 65* 68* 64*   CREATININE 1.9* 1.9* 1.7*         Estimated Creatinine Clearance: 57 mL/min (A) (based on SCr of 1.7 mg/dL (H)).  Improving    - Renal US: renal cysts, no hydronephrosis  - Urine Na: 14, Pr/Cr: 0.72; FENa 0.2%  - Monitor urine output and serial BMP and adjust therapy as needed.   - Strict I&Os and daily weights   - Avoid nephrotoxic agents such as NSAIDs, gadolinium and IV radiocontrast.  - Renally dose meds to current GFR.  - Maintain MAP > 65.  - Nephrology referral outpatient    AMELIA (obstructive sleep apnea)  Carried diagnosis of AMELIA per chart, however he does not sleep with CPAP at home and declines while here      NICM (nonischemic cardiomyopathy)  ASA 325mg qd per home medication list. Reason for this dose unclear.    - Restarting home amio       Biventricular ICD (implantable cardioverter-defibrillator) in place  Stable on admission, no acute issues, he denies discharge. QTc chronically prolonged. Continue home amiodarone for NSVT prevention. Monitor on telemetry     - 12/31, noted to have AF RVR with hypotension. Started on amio gtt. Per report, pacemaker  malfunctioning. EP consulted for formal evaluation   - Restarted on home oral amio  - Device interrogated, EP has signed off    Hyperlipidemia  Stable. Continue Home Pravastatin.        Essential hypertension  Chronic, controlled. Latest blood pressure and vitals reviewed-     Temp:  [97.3 °F (36.3 °C)-98.6 °F (37 °C)]   Pulse:  [70-89]   Resp:  [18-20]   BP: ()/(54-75)   SpO2:  [95 %-99 %] .   Home meds for hypertension were reviewed and noted below.   Hypertension Medications               furosemide (LASIX) 80 MG tablet TAKE 1 TABLET EVERY DAY    metoprolol succinate (TOPROL-XL) 50 MG 24 hr tablet TAKE 1 TABLET EVERY DAY    spironolactone (ALDACTONE) 25 MG tablet TAKE 1/2 TABLET (12.5 MG TOTAL) ONCE DAILY. HOLD IF SYSTOLIC BLOOD PRESSURE IS LESS THAN 110            While in the hospital, will manage blood pressure as follows; Adjust home antihypertensive regimen as follows- Holding in setting of borderline pressures in setting of new diuresis     Will utilize p.r.n. blood pressure medication only if patient's blood pressure greater than 180/110 and he develops symptoms such as worsening chest pain or shortness of breath.      VTE Risk Mitigation (From admission, onward)           Ordered     heparin (porcine) injection 5,000 Units  Every 8 hours         12/29/23 1759     IP VTE HIGH RISK PATIENT  Once         12/29/23 1759     Place sequential compression device  Until discontinued         12/29/23 1759                    Discharge Planning   MARIIA: 1/9/2024     Code Status: Full Code   Is the patient medically ready for discharge?: No    Reason for patient still in hospital (select all that apply): Treatment  Discharge Plan A: Home with family                  Rob Trujillo MD  Department of Hospital Medicine   [facility] - Cardiology Stepdown

## 2024-01-05 NOTE — ASSESSMENT & PLAN NOTE
Reports a 3 month history of inability to tolerate solid foods. SLP has assessed him, can tolerate liquids.     - Adding boost to meals, currently on liquid diet  - GI cancelled EGD and signing off. Will re-consult once patient is more euvolemic and hemodynamically stable.

## 2024-01-05 NOTE — SUBJECTIVE & OBJECTIVE
Interval History:  No acute events overnight.  Patient continued to maintain a regular rhythm.  Patient had urine output of 2100 mL, but still has some appearance of hypovolemia on physical examination.  We will continue t.i.d. Lasix dosing.  Labs continue to show his chronic thrombocytopenia and bilirubinemia.  Potassium was decreased at 3.3 and he was given some repletion via IV prior to his EGD.  EGD was scheduled for this afternoon with the GI, and complaining of very dry mouth and lips due to NPO. EGD canceled and GI signing off until patient has further diuresis and is euvolemic.     Review of Systems   HENT:  Positive for trouble swallowing.    Respiratory:  Positive for cough and shortness of breath.      Objective:     Vital Signs (Most Recent):  Temp: 98.6 °F (37 °C) (01/05/24 0821)  Pulse: 77 (01/05/24 0821)  Resp: 18 (01/05/24 0821)  BP: 108/73 (01/05/24 0821)  SpO2: 98 % (01/05/24 0821) Vital Signs (24h Range):  Temp:  [97.5 °F (36.4 °C)-98.6 °F (37 °C)] 98.6 °F (37 °C)  Pulse:  [68-89] 77  Resp:  [18-20] 18  SpO2:  [95 %-100 %] 98 %  BP: ()/(66-75) 108/73     Weight: 112.9 kg (248 lb 14.4 oz)  Body mass index is 29.52 kg/m².    Intake/Output Summary (Last 24 hours) at 1/5/2024 0832  Last data filed at 1/5/2024 0531  Gross per 24 hour   Intake 600 ml   Output 2100 ml   Net -1500 ml         Physical Exam  Vitals and nursing note reviewed.   Constitutional:       Appearance: He is ill-appearing.   HENT:      Head: Normocephalic and atraumatic.      Mouth/Throat:      Mouth: Mucous membranes are dry.      Comments: Cracked lips and dry oral mucose  Eyes:      General: Scleral icterus present.      Extraocular Movements: Extraocular movements intact.      Comments: Anisocoria   Cardiovascular:      Rate and Rhythm: Normal rate and regular rhythm.      Heart sounds:      Gallop present.      Comments: JVD, JVP to level of mandible at 45 degrees.   Pulmonary:      Effort: Pulmonary effort is normal. No  respiratory distress.      Breath sounds: Normal breath sounds.      Comments: Crackles bilaterally. Patient off nasal cannula, on room air.  Abdominal:      General: Abdomen is flat. There is no distension.      Palpations: Abdomen is soft.      Tenderness: There is no abdominal tenderness.   Musculoskeletal:         General: Tenderness (LLE tenderness) present.      Right lower leg: Edema present.      Left lower leg: Edema present.      Comments: Anasarca, wound dressings on RLE and LLE  +2 pitting edema on posterior thighs  +1 pitting edema B/L LE to mid shin   Skin:     General: Skin is warm and dry.   Neurological:      Mental Status: He is alert and oriented to person, place, and time.      Motor: Weakness present.   Psychiatric:         Mood and Affect: Mood normal.         Behavior: Behavior normal.             Significant Labs: All pertinent labs within the past 24 hours have been reviewed.  CBC:   Recent Labs   Lab 01/04/24  0530 01/05/24  0526   WBC 8.53 9.00   HGB 13.6* 13.4*   HCT 40.8 38.7*   PLT 89* 82*     CMP:   Recent Labs   Lab 01/04/24  0530 01/04/24  1332 01/05/24  0525    137 138   K 3.4* 4.0 3.3*   CL 96 97 98   CO2 27 25 25    114* 93   BUN 64* 62* 60*   CREATININE 1.7* 1.5* 1.6*   CALCIUM 9.1 9.2 9.0   PROT 6.7  --  6.5   ALBUMIN 2.5*  --  2.4*   BILITOT 6.3*  --  6.8*   ALKPHOS 102  --  104   AST 41*  --  40   ALT 39  --  34   ANIONGAP 15 15 15     Magnesium:   Recent Labs   Lab 01/04/24  0530 01/05/24  0525   MG 2.2 2.2       Significant Imaging: I have reviewed all pertinent imaging results/findings within the past 24 hours.

## 2024-01-05 NOTE — ASSESSMENT & PLAN NOTE
Patient is identified as having Combined Systolic and Diastolic heart failure that is Acute on chronic. CHF is currently uncontrolled due to Pulmonary edema/pleural effusion on CXR. Latest ECHO performed and demonstrates- Results for orders placed during the hospital encounter of 11/10/23    Echo    Interpretation Summary    Left Ventricle: The left ventricle is severely dilated. Mildly increased wall thickness. There is mild concentric hypertrophy. Severe global hypokinesis present. There is severely reduced systolic function with a visually estimated ejection fraction of 10 -15%. Grade III diastolic dysfunction.    Right Ventricle: Severe right ventricular enlargement. Systolic function is severely reduced.    Mitral Valve: There is no stenosis. There is mild to moderate regurgitation with a centrally directed jet.    Tricuspid Valve: There is mild to moderate regurgitation.    Pulmonary Artery: The estimated pulmonary artery systolic pressure is 67 mmHg.  .Last BNP reviewed- and noted below   Recent Labs   Lab 01/01/24  0551   BNP 2,684*         Presented for acute on chronic SOB with associated low UOP. Afebrile HDS. BNP 3,067, Troponin 0.031. CXR with cardiomegaly, vascular congestion, and edema. Received 100 of Lasix in the ED.    - RIP positive for Covid, treatment completed.  - Good response with IV lasix 80 TID, continuing diuresis   - continue home Jardiance   - Beta blocker/aldactone held given soft pressures, resume when appropriate  - Cardiac diet with Fluid restriction at 1.5L with strict I/Os and daily STANDING weights  - Check Electrolytes, keep Mag >2 & K+ >4  - SCDs, Ambulate as tolerated    - Strict I&Os, Daily standing weights  - Review Low-salt diet and enforce medication adherence on discharge

## 2024-01-05 NOTE — ASSESSMENT & PLAN NOTE
Patient with chronic lower leg wounds that recently established with wound care. He is set to follow up with vascular surgery outpatient at the end of February.    Appreciate wound care recommendations  Continue diuresis

## 2024-01-05 NOTE — ASSESSMENT & PLAN NOTE
Complaints of several day history of N/V on admission. Also noted 3 month history of difficulty swallowing solid foods.    - Consulted SLP, able to tolerate liquids  - GI cancelled EGD and signing off. Will re-consult once patient is more euvolemic and hemodynamically stable.   - Continuing liquid diet as per SLP

## 2024-01-05 NOTE — PLAN OF CARE
Pt maintained free from falls/trauma/injuries and skin breakdown. Pt denied pain or discomfort. Plan of care reviewed. Pt verbalized understanding. All questions and concerns addressed. Will continue to monitor.  Problem: Adult Inpatient Plan of Care  Goal: Plan of Care Review  1/4/2024 2146 by Lamin Roe RN  Outcome: Ongoing, Progressing  1/4/2024 2146 by Lamin Roe RN  Outcome: Ongoing, Progressing  Goal: Patient-Specific Goal (Individualized)  1/4/2024 2146 by Lamin Roe RN  Outcome: Ongoing, Progressing  1/4/2024 2146 by Lamin Roe RN  Outcome: Ongoing, Progressing  Goal: Absence of Hospital-Acquired Illness or Injury  1/4/2024 2146 by Lamin Roe RN  Outcome: Ongoing, Progressing  1/4/2024 2146 by Lamin Roe RN  Outcome: Ongoing, Progressing  Goal: Optimal Comfort and Wellbeing  1/4/2024 2146 by Lamin Roe RN  Outcome: Ongoing, Progressing  1/4/2024 2146 by Lamin Roe RN  Outcome: Ongoing, Progressing  Goal: Readiness for Transition of Care  1/4/2024 2146 by Lamin Roe RN  Outcome: Ongoing, Progressing  1/4/2024 2146 by Lamin Roe RN  Outcome: Ongoing, Progressing     Problem: Fluid and Electrolyte Imbalance (Acute Kidney Injury/Impairment)  Goal: Fluid and Electrolyte Balance  1/4/2024 2146 by Lamin Roe RN  Outcome: Ongoing, Progressing  1/4/2024 2146 by Lamin Roe RN  Outcome: Ongoing, Progressing     Problem: Oral Intake Inadequate (Acute Kidney Injury/Impairment)  Goal: Optimal Nutrition Intake  1/4/2024 2146 by Lamin Roe RN  Outcome: Ongoing, Progressing  1/4/2024 2146 by Lamin Roe RN  Outcome: Ongoing, Progressing     Problem: Renal Function Impairment (Acute Kidney Injury/Impairment)  Goal: Effective Renal Function  1/4/2024 2146 by Lamin Roe RN  Outcome: Ongoing, Progressing  1/4/2024 2146 by Lamin Roe RN  Outcome: Ongoing, Progressing     Problem: Impaired Wound Healing  Goal: Optimal Wound Healing  1/4/2024 2146 by  Lamin Roe RN  Outcome: Ongoing, Progressing  1/4/2024 2146 by Lamin Roe RN  Outcome: Ongoing, Progressing     Problem: Adjustment to Illness (Heart Failure)  Goal: Optimal Coping  1/4/2024 2146 by Lamin Roe RN  Outcome: Ongoing, Progressing  1/4/2024 2146 by Lamin Roe RN  Outcome: Ongoing, Progressing     Problem: Cardiac Output Decreased (Heart Failure)  Goal: Optimal Cardiac Output  1/4/2024 2146 by Lamin Roe RN  Outcome: Ongoing, Progressing  1/4/2024 2146 by Lamin Roe RN  Outcome: Ongoing, Progressing     Problem: Dysrhythmia (Heart Failure)  Goal: Stable Heart Rate and Rhythm  1/4/2024 2146 by Lamin Roe RN  Outcome: Ongoing, Progressing  1/4/2024 2146 by Lamin Roe RN  Outcome: Ongoing, Progressing     Problem: Fluid Imbalance (Heart Failure)  Goal: Fluid Balance  1/4/2024 2146 by Lamin Roe RN  Outcome: Ongoing, Progressing  1/4/2024 2146 by Lamin Roe RN  Outcome: Ongoing, Progressing     Problem: Functional Ability Impaired (Heart Failure)  Goal: Optimal Functional Ability  1/4/2024 2146 by Lamin Roe RN  Outcome: Ongoing, Progressing  1/4/2024 2146 by Lamin Roe RN  Outcome: Ongoing, Progressing     Problem: Oral Intake Inadequate (Heart Failure)  Goal: Optimal Nutrition Intake  1/4/2024 2146 by Lamin Roe RN  Outcome: Ongoing, Progressing  1/4/2024 2146 by Lamin Roe RN  Outcome: Ongoing, Progressing     Problem: Respiratory Compromise (Heart Failure)  Goal: Effective Oxygenation and Ventilation  1/4/2024 2146 by Lamin Roe RN  Outcome: Ongoing, Progressing  1/4/2024 2146 by Lamin Roe RN  Outcome: Ongoing, Progressing     Problem: Sleep Disordered Breathing (Heart Failure)  Goal: Effective Breathing Pattern During Sleep  1/4/2024 2146 by Lamin Roe RN  Outcome: Ongoing, Progressing  1/4/2024 2146 by Lamin Roe RN  Outcome: Ongoing, Progressing     Problem: Skin Injury Risk Increased  Goal: Skin Health and  Integrity  1/4/2024 2146 by Lamin Roe, RN  Outcome: Ongoing, Progressing  1/4/2024 2146 by Lamin Roe, RN  Outcome: Ongoing, Progressing

## 2024-01-05 NOTE — CARE UPDATE
Patient cancelled for endoscopic procedure today given significant hypervolemia and the need for aggressive diuresis. Discussed with primary team to reach back out to the GI team once he has been adequately diuresed and stable from a cardiac standpoint. We will sign-off.

## 2024-01-05 NOTE — ASSESSMENT & PLAN NOTE
Patient is identified as having Combined Systolic and Diastolic heart failure that is Acute on chronic. CHF is currently uncontrolled due to Pulmonary edema/pleural effusion on CXR. Latest ECHO performed and demonstrates- Results for orders placed during the hospital encounter of 11/10/23    Echo    Interpretation Summary    Left Ventricle: The left ventricle is severely dilated. Mildly increased wall thickness. There is mild concentric hypertrophy. Severe global hypokinesis present. There is severely reduced systolic function with a visually estimated ejection fraction of 10 -15%. Grade III diastolic dysfunction.    Right Ventricle: Severe right ventricular enlargement. Systolic function is severely reduced.    Mitral Valve: There is no stenosis. There is mild to moderate regurgitation with a centrally directed jet.    Tricuspid Valve: There is mild to moderate regurgitation.    Pulmonary Artery: The estimated pulmonary artery systolic pressure is 67 mmHg.  .Last BNP reviewed- and noted below   Recent Labs   Lab 01/01/24  0551   BNP 2,684*         Presented for acute on chronic SOB with associated low UOP. Afebrile HDS. BNP 3,067, Troponin 0.031. CXR with cardiomegaly, vascular congestion, and edema. Received 100 of Lasix in the ED.    - RIP positive for Covid, treatment completed.  - Good response with IV lasix 80 TID, now converted to lasix drip at 15mg/hour   - continue home Jardiance   - Beta blocker/aldactone held given soft pressures, resume when appropriate  - Cardiac diet with Fluid restriction at 1.5L with strict I/Os and daily STANDING weights  - Check Electrolytes, keep Mag >2 & K+ >4  - SCDs, Ambulate as tolerated    - Strict I&Os, Daily standing weights  - Review Low-salt diet and enforce medication adherence on discharge

## 2024-01-06 PROBLEM — R11.11 VOMITING WITHOUT NAUSEA: Status: ACTIVE | Noted: 2023-11-10

## 2024-01-06 PROBLEM — R11.11 VOMITING WITHOUT NAUSEA: Status: RESOLVED | Noted: 2023-11-10 | Resolved: 2024-01-06

## 2024-01-06 LAB
ALBUMIN SERPL BCP-MCNC: 2.4 G/DL (ref 3.5–5.2)
ALP SERPL-CCNC: 117 U/L (ref 55–135)
ALT SERPL W/O P-5'-P-CCNC: 39 U/L (ref 10–44)
ANION GAP SERPL CALC-SCNC: 13 MMOL/L (ref 8–16)
ANION GAP SERPL CALC-SCNC: 13 MMOL/L (ref 8–16)
AST SERPL-CCNC: 49 U/L (ref 10–40)
BACTERIA #/AREA URNS AUTO: ABNORMAL /HPF
BASOPHILS # BLD AUTO: 0.01 K/UL (ref 0–0.2)
BASOPHILS NFR BLD: 0.1 % (ref 0–1.9)
BILIRUB DIRECT SERPL-MCNC: 5.8 MG/DL (ref 0.1–0.3)
BILIRUB SERPL-MCNC: 7.2 MG/DL (ref 0.1–1)
BILIRUB UR QL STRIP: NEGATIVE
BUN SERPL-MCNC: 53 MG/DL (ref 8–23)
BUN SERPL-MCNC: 60 MG/DL (ref 8–23)
CALCIUM SERPL-MCNC: 8.8 MG/DL (ref 8.7–10.5)
CALCIUM SERPL-MCNC: 9 MG/DL (ref 8.7–10.5)
CHLORIDE SERPL-SCNC: 98 MMOL/L (ref 95–110)
CHLORIDE SERPL-SCNC: 99 MMOL/L (ref 95–110)
CLARITY UR REFRACT.AUTO: ABNORMAL
CO2 SERPL-SCNC: 26 MMOL/L (ref 23–29)
CO2 SERPL-SCNC: 27 MMOL/L (ref 23–29)
COLOR UR AUTO: YELLOW
CREAT SERPL-MCNC: 1.4 MG/DL (ref 0.5–1.4)
CREAT SERPL-MCNC: 1.6 MG/DL (ref 0.5–1.4)
DIFFERENTIAL METHOD BLD: ABNORMAL
EOSINOPHIL # BLD AUTO: 0 K/UL (ref 0–0.5)
EOSINOPHIL NFR BLD: 0 % (ref 0–8)
ERYTHROCYTE [DISTWIDTH] IN BLOOD BY AUTOMATED COUNT: 21.4 % (ref 11.5–14.5)
ERYTHROCYTE [DISTWIDTH] IN BLOOD BY AUTOMATED COUNT: 21.5 % (ref 11.5–14.5)
EST. GFR  (NO RACE VARIABLE): 46.6 ML/MIN/1.73 M^2
EST. GFR  (NO RACE VARIABLE): 54.7 ML/MIN/1.73 M^2
GGT SERPL-CCNC: 55 U/L (ref 8–55)
GLUCOSE SERPL-MCNC: 121 MG/DL (ref 70–110)
GLUCOSE SERPL-MCNC: 98 MG/DL (ref 70–110)
GLUCOSE UR QL STRIP: ABNORMAL
HAPTOGLOB SERPL-MCNC: 128 MG/DL (ref 30–250)
HCT VFR BLD AUTO: 39.5 % (ref 40–54)
HCT VFR BLD AUTO: 39.6 % (ref 40–54)
HGB BLD-MCNC: 13.5 G/DL (ref 14–18)
HGB BLD-MCNC: 13.7 G/DL (ref 14–18)
HGB UR QL STRIP: ABNORMAL
IMM GRANULOCYTES # BLD AUTO: 0.06 K/UL (ref 0–0.04)
IMM GRANULOCYTES NFR BLD AUTO: 0.6 % (ref 0–0.5)
KETONES UR QL STRIP: NEGATIVE
LDH SERPL L TO P-CCNC: 364 U/L (ref 110–260)
LEUKOCYTE ESTERASE UR QL STRIP: NEGATIVE
LYMPHOCYTES # BLD AUTO: 0.8 K/UL (ref 1–4.8)
LYMPHOCYTES NFR BLD: 8 % (ref 18–48)
MAGNESIUM SERPL-MCNC: 2.3 MG/DL (ref 1.6–2.6)
MCH RBC QN AUTO: 23.6 PG (ref 27–31)
MCH RBC QN AUTO: 23.7 PG (ref 27–31)
MCHC RBC AUTO-ENTMCNC: 34.2 G/DL (ref 32–36)
MCHC RBC AUTO-ENTMCNC: 34.6 G/DL (ref 32–36)
MCV RBC AUTO: 69 FL (ref 82–98)
MCV RBC AUTO: 69 FL (ref 82–98)
MICROSCOPIC COMMENT: ABNORMAL
MONOCYTES # BLD AUTO: 0.8 K/UL (ref 0.3–1)
MONOCYTES NFR BLD: 8.9 % (ref 4–15)
NEUTROPHILS # BLD AUTO: 7.7 K/UL (ref 1.8–7.7)
NEUTROPHILS NFR BLD: 82.4 % (ref 38–73)
NITRITE UR QL STRIP: NEGATIVE
NON-SQ EPI CELLS #/AREA URNS AUTO: 2 /HPF
NRBC BLD-RTO: 2 /100 WBC
PATH REV BLD -IMP: NORMAL
PH UR STRIP: 5 [PH] (ref 5–8)
PHOSPHATE SERPL-MCNC: 2.6 MG/DL (ref 2.7–4.5)
PLATELET # BLD AUTO: 82 K/UL (ref 150–450)
PLATELET # BLD AUTO: 83 K/UL (ref 150–450)
PMV BLD AUTO: ABNORMAL FL (ref 9.2–12.9)
PMV BLD AUTO: ABNORMAL FL (ref 9.2–12.9)
POTASSIUM SERPL-SCNC: 3.5 MMOL/L (ref 3.5–5.1)
POTASSIUM SERPL-SCNC: 3.7 MMOL/L (ref 3.5–5.1)
PROT SERPL-MCNC: 6.5 G/DL (ref 6–8.4)
PROT UR QL STRIP: ABNORMAL
RBC # BLD AUTO: 5.72 M/UL (ref 4.6–6.2)
RBC # BLD AUTO: 5.78 M/UL (ref 4.6–6.2)
RBC #/AREA URNS AUTO: 73 /HPF (ref 0–4)
SODIUM SERPL-SCNC: 138 MMOL/L (ref 136–145)
SODIUM SERPL-SCNC: 138 MMOL/L (ref 136–145)
SP GR UR STRIP: 1.01 (ref 1–1.03)
SQUAMOUS #/AREA URNS AUTO: 1 /HPF
TROPONIN I SERPL DL<=0.01 NG/ML-MCNC: 0.02 NG/ML (ref 0–0.03)
URN SPEC COLLECT METH UR: ABNORMAL
WBC # BLD AUTO: 9.33 K/UL (ref 3.9–12.7)
WBC # BLD AUTO: 9.51 K/UL (ref 3.9–12.7)
WBC #/AREA URNS AUTO: 16 /HPF (ref 0–5)
YEAST UR QL AUTO: ABNORMAL

## 2024-01-06 PROCEDURE — 83615 LACTATE (LD) (LDH) ENZYME: CPT

## 2024-01-06 PROCEDURE — 27000207 HC ISOLATION

## 2024-01-06 PROCEDURE — 84484 ASSAY OF TROPONIN QUANT: CPT

## 2024-01-06 PROCEDURE — 25000003 PHARM REV CODE 250: Performed by: INTERNAL MEDICINE

## 2024-01-06 PROCEDURE — 82248 BILIRUBIN DIRECT: CPT

## 2024-01-06 PROCEDURE — 81001 URINALYSIS AUTO W/SCOPE: CPT

## 2024-01-06 PROCEDURE — 84100 ASSAY OF PHOSPHORUS: CPT | Performed by: STUDENT IN AN ORGANIZED HEALTH CARE EDUCATION/TRAINING PROGRAM

## 2024-01-06 PROCEDURE — 20600001 HC STEP DOWN PRIVATE ROOM

## 2024-01-06 PROCEDURE — 93010 ELECTROCARDIOGRAM REPORT: CPT | Mod: ,,, | Performed by: INTERNAL MEDICINE

## 2024-01-06 PROCEDURE — 80053 COMPREHEN METABOLIC PANEL: CPT | Performed by: STUDENT IN AN ORGANIZED HEALTH CARE EDUCATION/TRAINING PROGRAM

## 2024-01-06 PROCEDURE — 82977 ASSAY OF GGT: CPT

## 2024-01-06 PROCEDURE — 51798 US URINE CAPACITY MEASURE: CPT

## 2024-01-06 PROCEDURE — 87077 CULTURE AEROBIC IDENTIFY: CPT

## 2024-01-06 PROCEDURE — 36415 COLL VENOUS BLD VENIPUNCTURE: CPT | Mod: XB

## 2024-01-06 PROCEDURE — 63600175 PHARM REV CODE 636 W HCPCS: Performed by: HOSPITALIST

## 2024-01-06 PROCEDURE — 25000003 PHARM REV CODE 250: Performed by: STUDENT IN AN ORGANIZED HEALTH CARE EDUCATION/TRAINING PROGRAM

## 2024-01-06 PROCEDURE — 83010 ASSAY OF HAPTOGLOBIN QUANT: CPT

## 2024-01-06 PROCEDURE — 87086 URINE CULTURE/COLONY COUNT: CPT

## 2024-01-06 PROCEDURE — 36415 COLL VENOUS BLD VENIPUNCTURE: CPT | Performed by: STUDENT IN AN ORGANIZED HEALTH CARE EDUCATION/TRAINING PROGRAM

## 2024-01-06 PROCEDURE — 25000003 PHARM REV CODE 250

## 2024-01-06 PROCEDURE — 80048 BASIC METABOLIC PNL TOTAL CA: CPT | Mod: XB

## 2024-01-06 PROCEDURE — 85060 BLOOD SMEAR INTERPRETATION: CPT | Mod: ,,, | Performed by: PATHOLOGY

## 2024-01-06 PROCEDURE — 25000003 PHARM REV CODE 250: Performed by: HOSPITALIST

## 2024-01-06 PROCEDURE — 85027 COMPLETE CBC AUTOMATED: CPT | Performed by: STUDENT IN AN ORGANIZED HEALTH CARE EDUCATION/TRAINING PROGRAM

## 2024-01-06 PROCEDURE — 87088 URINE BACTERIA CULTURE: CPT

## 2024-01-06 PROCEDURE — 83735 ASSAY OF MAGNESIUM: CPT | Performed by: STUDENT IN AN ORGANIZED HEALTH CARE EDUCATION/TRAINING PROGRAM

## 2024-01-06 PROCEDURE — 51702 INSERT TEMP BLADDER CATH: CPT

## 2024-01-06 PROCEDURE — 25000242 PHARM REV CODE 250 ALT 637 W/ HCPCS

## 2024-01-06 PROCEDURE — 87186 SC STD MICRODIL/AGAR DIL: CPT

## 2024-01-06 PROCEDURE — 85025 COMPLETE CBC W/AUTO DIFF WBC: CPT

## 2024-01-06 PROCEDURE — 93005 ELECTROCARDIOGRAM TRACING: CPT

## 2024-01-06 RX ADMIN — POTASSIUM BICARBONATE 40 MEQ: 391 TABLET, EFFERVESCENT ORAL at 06:01

## 2024-01-06 RX ADMIN — EMPAGLIFLOZIN 10 MG: 10 TABLET, FILM COATED ORAL at 09:01

## 2024-01-06 RX ADMIN — SENNOSIDES AND DOCUSATE SODIUM 1 TABLET: 8.6; 5 TABLET ORAL at 09:01

## 2024-01-06 RX ADMIN — PRAVASTATIN SODIUM 80 MG: 40 TABLET ORAL at 09:01

## 2024-01-06 RX ADMIN — POTASSIUM BICARBONATE 25 MEQ: 978 TABLET, EFFERVESCENT ORAL at 10:01

## 2024-01-06 RX ADMIN — ASPIRIN 325 MG: 325 TABLET, COATED ORAL at 09:01

## 2024-01-06 RX ADMIN — MUPIROCIN: 20 OINTMENT TOPICAL at 08:01

## 2024-01-06 RX ADMIN — AMIODARONE HYDROCHLORIDE 200 MG: 200 TABLET ORAL at 09:01

## 2024-01-06 RX ADMIN — MUPIROCIN: 20 OINTMENT TOPICAL at 09:01

## 2024-01-06 RX ADMIN — FUROSEMIDE 15 MG/HR: 10 INJECTION, SOLUTION INTRAMUSCULAR; INTRAVENOUS at 10:01

## 2024-01-06 RX ADMIN — POTASSIUM PHOSPHATE, MONOBASIC AND POTASSIUM PHOSPHATE, DIBASIC 20 MMOL: 224; 236 INJECTION, SOLUTION, CONCENTRATE INTRAVENOUS at 10:01

## 2024-01-06 NOTE — SUBJECTIVE & OBJECTIVE
Interval History: Patient with increasing shortness of breath and chest tightness today. EKG and troponin ordered. Trop negative, EKG without changes from prior. CXR pending. Evaluating for urinary retention. Large BM this morning    Review of Systems  Objective:     Vital Signs (Most Recent):  Temp: 98.7 °F (37.1 °C) (01/06/24 1123)  Pulse: 74 (01/06/24 1200)  Resp: 20 (01/06/24 1123)  BP: 100/77 (01/06/24 1123)  SpO2: (!) 93 % (01/06/24 1123) Vital Signs (24h Range):  Temp:  [97.4 °F (36.3 °C)-98.7 °F (37.1 °C)] 98.7 °F (37.1 °C)  Pulse:  [73-91] 74  Resp:  [18-22] 20  SpO2:  [93 %-100 %] 93 %  BP: ()/(58-77) 100/77     Weight: 107.1 kg (236 lb 1.8 oz)  Body mass index is 28 kg/m².    Intake/Output Summary (Last 24 hours) at 1/6/2024 1422  Last data filed at 1/6/2024 1211  Gross per 24 hour   Intake 830 ml   Output 675 ml   Net 155 ml         Physical Exam  Vitals and nursing note reviewed.   Constitutional:       Appearance: He is ill-appearing.   HENT:      Head: Normocephalic and atraumatic.      Mouth/Throat:      Mouth: Mucous membranes are dry.      Comments: Cracked lips and dry oral mucose  Eyes:      General: Scleral icterus present.      Extraocular Movements: Extraocular movements intact.      Comments: Anisocoria   Cardiovascular:      Rate and Rhythm: Normal rate and regular rhythm.      Heart sounds:      Gallop present.      Comments: JVD, JVP to level of mandible at 45 degrees.   Pulmonary:      Breath sounds: Normal breath sounds.      Comments: Increased work of breathing noted today  Accessory muscle use  2L NC  Lungs clear to auscultation   Abdominal:      General: Abdomen is flat. There is no distension.      Palpations: Abdomen is soft.      Tenderness: There is no abdominal tenderness.   Musculoskeletal:         General: Tenderness (LLE tenderness) present.      Cervical back: Normal range of motion.      Right lower leg: Edema present.      Left lower leg: Edema present.       Comments: Bilateral wrinkles of lower extremities   Skin:     General: Skin is warm and dry.   Neurological:      Mental Status: He is alert and oriented to person, place, and time.      Motor: Weakness present.   Psychiatric:         Mood and Affect: Mood normal.         Behavior: Behavior normal.             Significant Labs: All pertinent labs within the past 24 hours have been reviewed.    Significant Imaging: I have reviewed all pertinent imaging results/findings within the past 24 hours.

## 2024-01-06 NOTE — ASSESSMENT & PLAN NOTE
Chronic, controlled. Latest blood pressure and vitals reviewed-     Temp:  [97.4 °F (36.3 °C)-98.7 °F (37.1 °C)]   Pulse:  [73-91]   Resp:  [18-22]   BP: ()/(58-77)   SpO2:  [93 %-100 %] .   Home meds for hypertension were reviewed and noted below.   Hypertension Medications               furosemide (LASIX) 80 MG tablet TAKE 1 TABLET EVERY DAY    metoprolol succinate (TOPROL-XL) 50 MG 24 hr tablet TAKE 1 TABLET EVERY DAY    spironolactone (ALDACTONE) 25 MG tablet TAKE 1/2 TABLET (12.5 MG TOTAL) ONCE DAILY. HOLD IF SYSTOLIC BLOOD PRESSURE IS LESS THAN 110            While in the hospital, will manage blood pressure as follows; Adjust home antihypertensive regimen as follows- Holding in setting of borderline pressures in setting of new diuresis     Will utilize p.r.n. blood pressure medication only if patient's blood pressure greater than 180/110 and he develops symptoms such as worsening chest pain or shortness of breath.

## 2024-01-06 NOTE — ASSESSMENT & PLAN NOTE
Reports a 3 month history of inability to tolerate solid foods. Notable vomiting immediatly with swallowing food/liquid. No complaints of nausea. SLP has assessed him, can tolerate liquids.     - Continue liquid diet, Boost TID  - GI cancelled EGD and signing off. Will re-consult once patient is more euvolemic and hemodynamically stable.

## 2024-01-06 NOTE — PROGRESS NOTES
[facility] - Cardiology [facility] Medicine  Progress Note    Patient Name: Papito Bhakta  MRN: [id number]  Patient Class: IP- Inpatient   Admission Date: 12/29/2023  Length of Stay: 4 days  Attending Physician: Shell Medrano*  Primary Care Provider: Brynn To MD        Subjective:     Principal Problem:Acute hypoxemic respiratory failure        HPI:  Papito Bhakta is a 68 y.o. male with NICM, combined CHF(11/2023 EF 10 -15%, G3DD) s/p ICD placement, CKD, HLD, HTN, and AMELIA who presents for bilateral lower extremity swelling and shortness of breath. Patient reports he has been having worsening shortness of breath for the past 6 months. He had acutely worsening SOB today where he described becoming profoundly dyspneic on exertion. He reported taking 2 of his 80 mg Lasix this morning with subsequent minimal urine output and minimal improvement in his SOB. He reports SOB aggravated with lying down and he requires frequent positional changes to keep comfortable. He reports additional poor appetite and urine output for the past week although he reports he has been drinking well. He had 2 episodes of nausea/vomiting this past week as well. He denies fever/chills, chest pain, abdominal pain, recent illness, medication changes. Patient reports adherence with all medications. He reports he lives with his daughter who helps him with his medications and healthcare.    Additionally he reports chronic leg pain from a chronic RLE wound. He went to wound care yesterday where dressings were changed and he was told they were healing well. He has bilateral lower extremity edema R>L that is typical for him and he denies recent worsening.    Overview/Hospital Course:  Pt admitted to hospital medicine for acute heart failure exacerbation and inability to swallow solids for 3 months duration. Initiated on IV lasix. SLP evaluated the patient and determined that he could tolerate liquids. He had a new leukocytosis  noted on 12/31, work up significant for COVID positive. He completed remdesivir, steroids held in order to prevent worsening fluid retention, and was able to be weaned to room oxygen by 1/4. Overnight 12/31, patient becane tachycardic and hypotensive (asymptomatic). Cardiology called and concluded that he was in atrial fibrillation with RVR and he was briefly changed from oral amiodarone to IV amiodarone for one day before resuming his oral dosing. PM interrogated, noted to be functioning accurately. Signed off. GI was consulted for his ongoing dysphagia. With his ongoing aggressive diuresis, COVID infection, and episode of hemodynamic stability, the EGD was deferred until he becomes more euvolemic. IV lasix increased from pushes to gtt. Urine output did not significantly increase, bladder scan with 325ml urine. Mcmahon placed.     Transient episode of increased respiratory distress 1/6. EKG without significant changes from prior. Troponin negative. CXR pending.    During his admission, noted to have chronic bilirubinemia, assumed likely secondary to congestive hepatopathy. RUQ US without biliary obstruction. Hemolysis labs negative. Tbili continued to uptrend despite improvement in fluid status and renal function. Also noted to have worsening thrombocytopenia, despite normal LFTs. He has chronic lower extremity leg wounds secondary to peripheral vascular disease. Wound care was consulted and assisted in managing the wounds during his admission. He has outpatient follow-up scheduled with vascular surgery at the end of February    Interval History: Patient with increasing shortness of breath and chest tightness today. EKG and troponin ordered. Trop negative, EKG without changes from prior. CXR pending. Evaluating for urinary retention. Large BM this morning    Review of Systems  Objective:     Vital Signs (Most Recent):  Temp: 98.7 °F (37.1 °C) (01/06/24 1123)  Pulse: 74 (01/06/24 1200)  Resp: 20 (01/06/24 1123)  BP:  100/77 (01/06/24 1123)  SpO2: (!) 93 % (01/06/24 1123) Vital Signs (24h Range):  Temp:  [97.4 °F (36.3 °C)-98.7 °F (37.1 °C)] 98.7 °F (37.1 °C)  Pulse:  [73-91] 74  Resp:  [18-22] 20  SpO2:  [93 %-100 %] 93 %  BP: ()/(58-77) 100/77     Weight: 107.1 kg (236 lb 1.8 oz)  Body mass index is 28 kg/m².    Intake/Output Summary (Last 24 hours) at 1/6/2024 1422  Last data filed at 1/6/2024 1211  Gross per 24 hour   Intake 830 ml   Output 675 ml   Net 155 ml         Physical Exam  Vitals and nursing note reviewed.   Constitutional:       Appearance: He is ill-appearing.   HENT:      Head: Normocephalic and atraumatic.      Mouth/Throat:      Mouth: Mucous membranes are dry.      Comments: Cracked lips and dry oral mucose  Eyes:      General: Scleral icterus present.      Extraocular Movements: Extraocular movements intact.      Comments: Anisocoria   Cardiovascular:      Rate and Rhythm: Normal rate and regular rhythm.      Heart sounds:      Gallop present.      Comments: JVD, JVP to level of mandible at 45 degrees.   Pulmonary:      Breath sounds: Normal breath sounds.      Comments: Increased work of breathing noted today  Accessory muscle use  2L NC  Lungs clear to auscultation   Abdominal:      General: Abdomen is flat. There is no distension.      Palpations: Abdomen is soft.      Tenderness: There is no abdominal tenderness.   Musculoskeletal:         General: Tenderness (LLE tenderness) present.      Cervical back: Normal range of motion.      Right lower leg: Edema present.      Left lower leg: Edema present.      Comments: Bilateral wrinkles of lower extremities   Skin:     General: Skin is warm and dry.   Neurological:      Mental Status: He is alert and oriented to person, place, and time.      Motor: Weakness present.   Psychiatric:         Mood and Affect: Mood normal.         Behavior: Behavior normal.             Significant Labs: All pertinent labs within the past 24 hours have been  reviewed.    Significant Imaging: I have reviewed all pertinent imaging results/findings within the past 24 hours.    Assessment/Plan:      * Acute hypoxemic respiratory failure  Patient with Hypoxic Respiratory failure which is Acute.  he is not on home oxygen. Supplemental oxygen was provided and noted-      Patient with persistent O2 requirement of 2-3 LPM. Largest contribution likely due to fluid overload in setting of heart failure exacerbation and possible urinary retention. Also found to be COVID positive, s/p course of remdesivir. With his persistent hypoxia, possible that he is developing a post viral pneumonia, or an aspiration pneumonia in setting of dysphagia.   Troponin and repeat EKG WNL, concern for ACS low.   He does have baseline arrhythmia with PM in place, recently interrogated. No AS on recent TTE.    - Continue lasix gtt (see acute on chronic HF)  - F/u CXR  - Low threshold to add antibiotic coverage for PNA  - Wean O2 as tolerated  - PT/OT consulted, as deconditioning may also be contributing    Acute on chronic combined systolic and diastolic CHF (congestive heart failure)  Patient is identified as having Combined Systolic and Diastolic heart failure that is Acute on chronic. CHF is currently uncontrolled due to Pulmonary edema/pleural effusion on CXR. Latest ECHO performed and demonstrates- Results for orders placed during the hospital encounter of 11/10/23    Echo    Interpretation Summary    Left Ventricle: The left ventricle is severely dilated. Mildly increased wall thickness. There is mild concentric hypertrophy. Severe global hypokinesis present. There is severely reduced systolic function with a visually estimated ejection fraction of 10 -15%. Grade III diastolic dysfunction.    Right Ventricle: Severe right ventricular enlargement. Systolic function is severely reduced.    Mitral Valve: There is no stenosis. There is mild to moderate regurgitation with a centrally directed jet.     Tricuspid Valve: There is mild to moderate regurgitation.    Pulmonary Artery: The estimated pulmonary artery systolic pressure is 67 mmHg.  .Last BNP reviewed- and noted below   Recent Labs   Lab 01/01/24  0551   BNP 2,684*         Presented for acute on chronic SOB with associated low UOP. Afebrile HDS. BNP 3,067, Troponin 0.031. CXR with cardiomegaly, vascular congestion, and edema. Received 100 of Lasix in the ED.    - RIP positive for Covid, treatment completed.  - Good response with IV lasix 80 TID, now converted to lasix drip at 15mg/hour   - continue home Jardiance   - Beta blocker/aldactone held given soft pressures, resume when appropriate  - Cardiac diet with Fluid restriction at 1.5L with strict I/Os and daily STANDING weights  - Check Electrolytes, keep Mag >2 & K+ >4  - SCDs, Ambulate as tolerated    - Strict I&Os, Daily standing weights  - Review Low-salt diet and enforce medication adherence on discharge    Dysphagia  Reports a 3 month history of inability to tolerate solid foods. Notable vomiting immediatly with swallowing food/liquid. No complaints of nausea. SLP has assessed him, can tolerate liquids.     - Continue liquid diet, Boost TID  - GI cancelled EGD and signing off. Will re-consult once patient is more euvolemic and hemodynamically stable.       Acute renal failure superimposed on stage 3a chronic kidney disease  Creatinine 2.1 on admit, baseline around 1.4. Likely prerenal etiology given urine sodium and FENa vs progression of CKD. Improving with increased diuresis     Recent Labs     01/05/24  0525 01/05/24  1958 01/06/24  0630   BUN 60* 57* 53*   CREATININE 1.6* 1.7* 1.4         Estimated Creatinine Clearance: 68.8 mL/min (based on SCr of 1.4 mg/dL).  Improving    - Renal US: renal cysts, no hydronephrosis  - Urine Na: 14, Pr/Cr: 0.72; FENa 0.2%  - Monitor urine output and serial BMP and adjust therapy as needed.   - Strict I&Os and daily weights   - Avoid nephrotoxic agents such as  NSAIDs, gadolinium and IV radiocontrast.  - Renally dose meds to current GFR.  - Maintain MAP > 65.  - Nephrology referral outpatient    CKD (chronic kidney disease)  Creatine stable for now. BMP reviewed- noted Estimated Creatinine Clearance: 68.8 mL/min (based on SCr of 1.4 mg/dL). according to latest data. Based on current GFR, CKD stage is stage 3 - GFR 30-59.  Monitor UOP and serial BMP and adjust therapy as needed. Renally dose meds. Avoid nephrotoxic medications and procedures.    COVID-19  COVID positive, noted 12/31    - Finished remdesivir course yesterday  - Hold steroids at this time, hesitant to exacerbate fluid retention     Microcytic anemia  Iron studies notable for Fe 26 and sat iron 8%. TIBC, ferritin, transferrin wnl. Likely iron deficiency anemia.    - Daily iron tablet  - Daily CBCs  - Consider IV iron supplementation     Pupil asymmetry  Notable asymetry on pupils by patient. Chart review shows left orbital trauma in 2021 with notes concerned for dilation and vision changes. When talking to daughter, this is chronic. States no vision changes or neuro symptoms whatsoever.     Serum total bilirubin elevated  Continues to increase. Direct bilirubinemia. No signs of hemolysis.    - Daily CMPs  - Appears chronic since November. Previously reported as secondary to congestive hepatopathy however remaining LFTs likely WNL.   - US Liver with doppler showing: Bidirectional portal vein flow may be seen with right heart failure, tricuspid regurgitation, or portal hypertension. Evidence of volume overload with distended IVC and hepatic veins.  Pulsatile flow through the hepatic veins (also possibly suggesting tricuspid regurgitation). Suspected hepatic steatosis. No evidence of biliary obstruction.  Possible genetic condition      AMELIA (obstructive sleep apnea)  Carried diagnosis of AMELIA per chart, however he does not sleep with CPAP at home and declines while here      Peripheral vascular disease,  unspecified  Patient with chronic lower leg wounds that recently established with wound care. He is set to follow up with vascular surgery outpatient at the end of February.    Appreciate wound care recommendations  Continue diuresis       NICM (nonischemic cardiomyopathy)  ASA 325mg qd per home medication list. Reason for this dose unclear.      Biventricular ICD (implantable cardioverter-defibrillator) in place  Stable on admission, no acute issues, he denies discharge. QTc chronically prolonged. Continue home amiodarone for NSVT prevention. Monitor on telemetry     - 12/31, noted to have AF RVR with hypotension. Started on amio gtt. Per report, pacemaker malfunctioning. EP consulted for formal evaluation   - Restarted on home oral amio  - Device interrogated, no complications. EP has signed off    Hyperlipidemia  Stable. Continue Home Pravastatin.        Essential hypertension  Chronic, controlled. Latest blood pressure and vitals reviewed-     Temp:  [97.4 °F (36.3 °C)-98.7 °F (37.1 °C)]   Pulse:  [73-91]   Resp:  [18-22]   BP: ()/(58-77)   SpO2:  [93 %-100 %] .   Home meds for hypertension were reviewed and noted below.   Hypertension Medications               furosemide (LASIX) 80 MG tablet TAKE 1 TABLET EVERY DAY    metoprolol succinate (TOPROL-XL) 50 MG 24 hr tablet TAKE 1 TABLET EVERY DAY    spironolactone (ALDACTONE) 25 MG tablet TAKE 1/2 TABLET (12.5 MG TOTAL) ONCE DAILY. HOLD IF SYSTOLIC BLOOD PRESSURE IS LESS THAN 110            While in the hospital, will manage blood pressure as follows; Adjust home antihypertensive regimen as follows- Holding in setting of borderline pressures in setting of new diuresis     Will utilize p.r.n. blood pressure medication only if patient's blood pressure greater than 180/110 and he develops symptoms such as worsening chest pain or shortness of breath.      VTE Risk Mitigation (From admission, onward)           Ordered     heparin (porcine) injection 5,000 Units   Every 8 hours         12/29/23 1759     IP VTE HIGH RISK PATIENT  Once         12/29/23 1759     Place sequential compression device  Until discontinued         12/29/23 1759                    Discharge Planning   MARIIA: 1/9/2024     Code Status: Full Code   Is the patient medically ready for discharge?: No    Reason for patient still in hospital (select all that apply): Patient unstable and Patient trending condition  Discharge Plan A: Home with family                  Jayda Jennings DO  Department of Hospital Medicine   [facility] - Cardiology Stepdown

## 2024-01-06 NOTE — PLAN OF CARE
Problem: Adult Inpatient Plan of Care  Goal: Plan of Care Review  Outcome: Ongoing, Progressing  Goal: Patient-Specific Goal (Individualized)  Outcome: Ongoing, Progressing  Goal: Absence of Hospital-Acquired Illness or Injury  Outcome: Ongoing, Progressing  Goal: Optimal Comfort and Wellbeing  Outcome: Ongoing, Progressing  Goal: Readiness for Transition of Care  Outcome: Ongoing, Progressing     Problem: Fluid and Electrolyte Imbalance (Acute Kidney Injury/Impairment)  Goal: Fluid and Electrolyte Balance  Outcome: Ongoing, Progressing     Problem: Oral Intake Inadequate (Acute Kidney Injury/Impairment)  Goal: Optimal Nutrition Intake  Outcome: Ongoing, Progressing     Problem: Renal Function Impairment (Acute Kidney Injury/Impairment)  Goal: Effective Renal Function  Outcome: Ongoing, Progressing     Problem: Impaired Wound Healing  Goal: Optimal Wound Healing  Outcome: Ongoing, Progressing     Problem: Adjustment to Illness (Heart Failure)  Goal: Optimal Coping  Outcome: Ongoing, Progressing     Problem: Cardiac Output Decreased (Heart Failure)  Goal: Optimal Cardiac Output  Outcome: Ongoing, Progressing     Problem: Dysrhythmia (Heart Failure)  Goal: Stable Heart Rate and Rhythm  Outcome: Ongoing, Progressing     Problem: Fluid Imbalance (Heart Failure)  Goal: Fluid Balance  Outcome: Ongoing, Progressing     Problem: Functional Ability Impaired (Heart Failure)  Goal: Optimal Functional Ability  Outcome: Ongoing, Progressing     Problem: Oral Intake Inadequate (Heart Failure)  Goal: Optimal Nutrition Intake  Outcome: Ongoing, Progressing     Problem: Respiratory Compromise (Heart Failure)  Goal: Effective Oxygenation and Ventilation  Outcome: Ongoing, Progressing     Problem: Sleep Disordered Breathing (Heart Failure)  Goal: Effective Breathing Pattern During Sleep  Outcome: Ongoing, Progressing     Problem: Skin Injury Risk Increased  Goal: Skin Health and Integrity  Outcome: Ongoing, Progressing     Problem:  Infection  Goal: Absence of Infection Signs and Symptoms  Outcome: Ongoing, Progressing

## 2024-01-06 NOTE — NURSING
16 Polish Friend Catheter inserted at this time.  Pt educated on the reason for friend, the insertion process, infection control, and any possible side effects of having the friend.  Understanding verbalized and questions denied.  Hand hygiene performed, friend inserted aseptically and pt tolerated well.  Immediate return of dark arpita urine with sediment.  Dr Jennings notified of urine characteristics and UA ordered.  Pt tolerated procedure well, denies any pain or complaints.  Will continue to monitor

## 2024-01-06 NOTE — ASSESSMENT & PLAN NOTE
Creatinine 2.1 on admit, baseline around 1.4. Likely prerenal etiology given urine sodium and FENa vs progression of CKD. Improving with increased diuresis     Recent Labs     01/05/24  0525 01/05/24 1958 01/06/24  0630   BUN 60* 57* 53*   CREATININE 1.6* 1.7* 1.4         Estimated Creatinine Clearance: 68.8 mL/min (based on SCr of 1.4 mg/dL).  Improving    - Renal US: renal cysts, no hydronephrosis  - Urine Na: 14, Pr/Cr: 0.72; FENa 0.2%  - Monitor urine output and serial BMP and adjust therapy as needed.   - Strict I&Os and daily weights   - Avoid nephrotoxic agents such as NSAIDs, gadolinium and IV radiocontrast.  - Renally dose meds to current GFR.  - Maintain MAP > 65.  - Nephrology referral outpatient

## 2024-01-06 NOTE — ASSESSMENT & PLAN NOTE
Continues to increase. Direct bilirubinemia. No signs of hemolysis.    - Daily CMPs  - Appears chronic since November. Previously reported as secondary to congestive hepatopathy however remaining LFTs likely WNL.   - US Liver with doppler showing: Bidirectional portal vein flow may be seen with right heart failure, tricuspid regurgitation, or portal hypertension. Evidence of volume overload with distended IVC and hepatic veins.  Pulsatile flow through the hepatic veins (also possibly suggesting tricuspid regurgitation). Suspected hepatic steatosis. No evidence of biliary obstruction.  Possible genetic condition

## 2024-01-06 NOTE — PLAN OF CARE
Pt's vs remained wnl and pt is AAOx4. Pt understood to call for assistance. Call bell in reach, bed locked and in lowest position, and personal items within reach. Pt got up to side of bed with assistance. Pt's meds were given and pt monitored throughout night. Pt's right foot wound site was cleaned and site redressed. Pt remained free from pain and s/s of distress. Successful report given to LAUREN Ellison.     Problem: Adult Inpatient Plan of Care  Goal: Plan of Care Review  Outcome: Ongoing, Progressing  Goal: Patient-Specific Goal (Individualized)  Outcome: Ongoing, Progressing  Goal: Absence of Hospital-Acquired Illness or Injury  Outcome: Ongoing, Progressing  Goal: Optimal Comfort and Wellbeing  Outcome: Ongoing, Progressing  Goal: Readiness for Transition of Care  Outcome: Ongoing, Progressing     Problem: Fluid and Electrolyte Imbalance (Acute Kidney Injury/Impairment)  Goal: Fluid and Electrolyte Balance  Outcome: Ongoing, Progressing     Problem: Oral Intake Inadequate (Acute Kidney Injury/Impairment)  Goal: Optimal Nutrition Intake  Outcome: Ongoing, Progressing     Problem: Renal Function Impairment (Acute Kidney Injury/Impairment)  Goal: Effective Renal Function  Outcome: Ongoing, Progressing     Problem: Impaired Wound Healing  Goal: Optimal Wound Healing  Outcome: Ongoing, Progressing     Problem: Adjustment to Illness (Heart Failure)  Goal: Optimal Coping  Outcome: Ongoing, Progressing     Problem: Fluid Imbalance (Heart Failure)  Goal: Fluid Balance  Outcome: Ongoing, Progressing     Problem: Oral Intake Inadequate (Heart Failure)  Goal: Optimal Nutrition Intake  Outcome: Ongoing, Progressing     Problem: Respiratory Compromise (Heart Failure)  Goal: Effective Oxygenation and Ventilation  Outcome: Ongoing, Progressing     Problem: Sleep Disordered Breathing (Heart Failure)  Goal: Effective Breathing Pattern During Sleep  Outcome: Ongoing, Progressing

## 2024-01-06 NOTE — ASSESSMENT & PLAN NOTE
Patient with Hypoxic Respiratory failure which is Acute.  he is not on home oxygen. Supplemental oxygen was provided and noted-      Patient with persistent O2 requirement of 2-3 LPM. Largest contribution likely due to fluid overload in setting of heart failure exacerbation and possible urinary retention. Also found to be COVID positive, s/p course of remdesivir. With his persistent hypoxia, possible that he is developing a post viral pneumonia, or an aspiration pneumonia in setting of dysphagia.   Troponin and repeat EKG WNL, concern for ACS low.   He does have baseline arrhythmia with PM in place, recently interrogated. No AS on recent TTE.    - Continue lasix gtt (see acute on chronic HF)  - F/u CXR  - Low threshold to add antibiotic coverage for PNA  - Wean O2 as tolerated  - PT/OT consulted, as deconditioning may also be contributing

## 2024-01-06 NOTE — ASSESSMENT & PLAN NOTE
Stable on admission, no acute issues, he denies discharge. QTc chronically prolonged. Continue home amiodarone for NSVT prevention. Monitor on telemetry     - 12/31, noted to have AF RVR with hypotension. Started on amio gtt. Per report, pacemaker malfunctioning. EP consulted for formal evaluation   - Restarted on home oral amio  - Device interrogated, no complications. EP has signed off

## 2024-01-06 NOTE — ASSESSMENT & PLAN NOTE
Creatine stable for now. BMP reviewed- noted Estimated Creatinine Clearance: 68.8 mL/min (based on SCr of 1.4 mg/dL). according to latest data. Based on current GFR, CKD stage is stage 3 - GFR 30-59.  Monitor UOP and serial BMP and adjust therapy as needed. Renally dose meds. Avoid nephrotoxic medications and procedures.

## 2024-01-07 LAB
ALBUMIN SERPL BCP-MCNC: 2.4 G/DL (ref 3.5–5.2)
ALP SERPL-CCNC: 111 U/L (ref 55–135)
ALT SERPL W/O P-5'-P-CCNC: 36 U/L (ref 10–44)
ANION GAP SERPL CALC-SCNC: 15 MMOL/L (ref 8–16)
AST SERPL-CCNC: 43 U/L (ref 10–40)
BILIRUB SERPL-MCNC: 8.8 MG/DL (ref 0.1–1)
BNP SERPL-MCNC: 2503 PG/ML (ref 0–99)
BUN SERPL-MCNC: 56 MG/DL (ref 8–23)
CALCIUM SERPL-MCNC: 9 MG/DL (ref 8.7–10.5)
CHLORIDE SERPL-SCNC: 99 MMOL/L (ref 95–110)
CO2 SERPL-SCNC: 25 MMOL/L (ref 23–29)
CREAT SERPL-MCNC: 1.5 MG/DL (ref 0.5–1.4)
ERYTHROCYTE [DISTWIDTH] IN BLOOD BY AUTOMATED COUNT: 21.9 % (ref 11.5–14.5)
EST. GFR  (NO RACE VARIABLE): 50.4 ML/MIN/1.73 M^2
GLUCOSE SERPL-MCNC: 99 MG/DL (ref 70–110)
HCT VFR BLD AUTO: 41.6 % (ref 40–54)
HGB BLD-MCNC: 13.7 G/DL (ref 14–18)
LACTATE SERPL-SCNC: 2.1 MMOL/L (ref 0.5–2.2)
MAGNESIUM SERPL-MCNC: 2.3 MG/DL (ref 1.6–2.6)
MCH RBC QN AUTO: 22.8 PG (ref 27–31)
MCHC RBC AUTO-ENTMCNC: 32.9 G/DL (ref 32–36)
MCV RBC AUTO: 69 FL (ref 82–98)
PHOSPHATE SERPL-MCNC: 3.2 MG/DL (ref 2.7–4.5)
PLATELET # BLD AUTO: 91 K/UL (ref 150–450)
PMV BLD AUTO: ABNORMAL FL (ref 9.2–12.9)
POTASSIUM SERPL-SCNC: 3.4 MMOL/L (ref 3.5–5.1)
PROT SERPL-MCNC: 6.6 G/DL (ref 6–8.4)
RBC # BLD AUTO: 6 M/UL (ref 4.6–6.2)
SODIUM SERPL-SCNC: 139 MMOL/L (ref 136–145)
WBC # BLD AUTO: 8.88 K/UL (ref 3.9–12.7)

## 2024-01-07 PROCEDURE — 97162 PT EVAL MOD COMPLEX 30 MIN: CPT

## 2024-01-07 PROCEDURE — 97535 SELF CARE MNGMENT TRAINING: CPT

## 2024-01-07 PROCEDURE — 27000207 HC ISOLATION

## 2024-01-07 PROCEDURE — 97165 OT EVAL LOW COMPLEX 30 MIN: CPT

## 2024-01-07 PROCEDURE — 84100 ASSAY OF PHOSPHORUS: CPT | Performed by: STUDENT IN AN ORGANIZED HEALTH CARE EDUCATION/TRAINING PROGRAM

## 2024-01-07 PROCEDURE — 83735 ASSAY OF MAGNESIUM: CPT | Performed by: STUDENT IN AN ORGANIZED HEALTH CARE EDUCATION/TRAINING PROGRAM

## 2024-01-07 PROCEDURE — 36415 COLL VENOUS BLD VENIPUNCTURE: CPT | Performed by: HOSPITALIST

## 2024-01-07 PROCEDURE — 97530 THERAPEUTIC ACTIVITIES: CPT

## 2024-01-07 PROCEDURE — 20600001 HC STEP DOWN PRIVATE ROOM

## 2024-01-07 PROCEDURE — 25000242 PHARM REV CODE 250 ALT 637 W/ HCPCS

## 2024-01-07 PROCEDURE — 25000003 PHARM REV CODE 250: Performed by: STUDENT IN AN ORGANIZED HEALTH CARE EDUCATION/TRAINING PROGRAM

## 2024-01-07 PROCEDURE — 94761 N-INVAS EAR/PLS OXIMETRY MLT: CPT

## 2024-01-07 PROCEDURE — 97112 NEUROMUSCULAR REEDUCATION: CPT

## 2024-01-07 PROCEDURE — 25000003 PHARM REV CODE 250

## 2024-01-07 PROCEDURE — 25000003 PHARM REV CODE 250: Performed by: HOSPITALIST

## 2024-01-07 PROCEDURE — 83605 ASSAY OF LACTIC ACID: CPT | Performed by: HOSPITALIST

## 2024-01-07 PROCEDURE — 80053 COMPREHEN METABOLIC PANEL: CPT | Performed by: STUDENT IN AN ORGANIZED HEALTH CARE EDUCATION/TRAINING PROGRAM

## 2024-01-07 PROCEDURE — 85027 COMPLETE CBC AUTOMATED: CPT | Performed by: STUDENT IN AN ORGANIZED HEALTH CARE EDUCATION/TRAINING PROGRAM

## 2024-01-07 PROCEDURE — 83880 ASSAY OF NATRIURETIC PEPTIDE: CPT | Performed by: HOSPITALIST

## 2024-01-07 RX ADMIN — EMPAGLIFLOZIN 10 MG: 10 TABLET, FILM COATED ORAL at 08:01

## 2024-01-07 RX ADMIN — ASPIRIN 325 MG: 325 TABLET, COATED ORAL at 08:01

## 2024-01-07 RX ADMIN — POTASSIUM BICARBONATE 40 MEQ: 391 TABLET, EFFERVESCENT ORAL at 10:01

## 2024-01-07 RX ADMIN — AMIODARONE HYDROCHLORIDE 200 MG: 200 TABLET ORAL at 08:01

## 2024-01-07 RX ADMIN — MUPIROCIN: 20 OINTMENT TOPICAL at 09:01

## 2024-01-07 RX ADMIN — MUPIROCIN: 20 OINTMENT TOPICAL at 08:01

## 2024-01-07 RX ADMIN — SENNOSIDES AND DOCUSATE SODIUM 1 TABLET: 8.6; 5 TABLET ORAL at 08:01

## 2024-01-07 RX ADMIN — PRAVASTATIN SODIUM 80 MG: 40 TABLET ORAL at 08:01

## 2024-01-07 RX ADMIN — POTASSIUM BICARBONATE 40 MEQ: 391 TABLET, EFFERVESCENT ORAL at 02:01

## 2024-01-07 NOTE — PT/OT/SLP EVAL
Physical Therapy Co-Evaluation and Co-Treatment  Co-evaluation with OT for maximal pt participation, safety, and activity tolerance    Patient Name:  Papito Bhakta   MRN:  [id number]  Admit Date: 12/29/2023  Admitting Diagnosis:  Acute hypoxemic respiratory failure   Length of Stay: 5 days  Recent Surgery: Procedure(s) (LRB):  EGD (ESOPHAGOGASTRODUODENOSCOPY) (N/A) 4 Days Post-Op    Recommendations:     Discharge Recommendations:  High Intensity Therapy  Discharge Equipment Recommendations: none   Justification for Equipment: N/A  Barriers to discharge: Evolving Clinical Presentation    Assessment:     Papito Bhakta is a 68 y.o. male admitted with a medical diagnosis of Acute hypoxemic respiratory failure.  He presents with the following impairments/functional limitations: impaired functional mobility, impaired endurance, pain, gait instability, decreased safety awareness, impaired balance, impaired self care skills, decreased lower extremity function.     Pt agreeable to therapy, resistant to getting out of bed but wants to sit on EOB. Pt with good strength and mobility but limited by pain in RLE. After sitting EOB for a while patient agrees to single stand and tolerates well. Continue to encourage OOB mobility and maximum activity tolerance.    Rehab Prognosis: Good; patient would benefit from acute skilled PT services to address these deficits and reach maximum level of function.      Treatment Tolerated: Fairly Well    Highest level of mobility achieved this visit: stands at EOB from EOB with min A x2    Activity with RN/PCT: transfer with 1 person assist    Plan:     During this hospitalization, patient to be seen 4 x/week to address the identified rehab impairments via gait training, therapeutic activities, therapeutic exercises, neuromuscular re-education and progress towards the established goals.    Plan of Care Expires:  02/07/24    Subjective     LAUREN Moreira notified prior to session. No family present upon PT  "entrance into room.    Chief Complaint: pain  Patient/Family Comments/goals: "I already got up with the nurse"  Pain/Comfort:  Pain Rating 1:  (unrated chronic RLE pain)  Location - Side 1: Right  Location - Orientation 1: generalized  Location 1: leg  Pain Addressed 1: Reposition, Distraction, Cessation of Activity    Social History:  Residence: lives with their daughter 1-story house with 0 SHIRA.  Support available: daughter(s)  Equipment Used: rollator, crutches, axillary, walker, rolling, shower chair (upright walker)  Equipment Owned (not using): None  Prior level of function: independent  Work: Retired.   Drive: no.       Objective:     Additional staff present: OT Carmen    Patient found HOB elevated with: telemetry, friend catheter, peripheral IV     General Precautions: Standard, fall, airborne, contact, droplet   Orthopedic Precautions:N/A   Braces: N/A   Body mass index is 28 kg/m².  Oxygen Device: Room Air    Vitals: BP 90/65 (BP Location: Right arm, Patient Position: Lying)   Pulse 74   Temp 98.7 °F (37.1 °C) (Oral)   Resp 20   Ht 6' 5" (1.956 m)   Wt 107.1 kg (236 lb 1.8 oz)   SpO2 (!) 93%   BMI 28.00 kg/m²     Exams:  Cognition:   Alert  Command following: Follows one-step verbal commands  Fluency: clear/fluent  Hearing: Intact  Vision:  Intact  Skin Integrity: Intact dressing present RLE    Physical Exam:   Edema - None noted  ROM - DOROTHY LEs WFL  Strength - DOROTHY LEs WFL   Sensation - Intact to light touch  Coordination - No deficits noted    Outcome Measures:    AM-PAC 6 CLICK MOBILITY  Turning over in bed (including adjusting bedclothes, sheets and blankets)?: 3  Sitting down on and standing up from a chair with arms (e.g., wheelchair, bedside commode, etc.): 2  Moving from lying on back to sitting on the side of the bed?: 3  Moving to and from a bed to a chair (including a wheelchair)?: 3  Need to walk in hospital room?: 3  Climbing 3-5 steps with a railing?: 2  Basic Mobility Total Score: " 16     Functional Mobility:    Bed Mobility:   Scooting to HOB via supine bridge: moderate assistance of 2 persons  Supine to Sit: minimum assistance; from R side of bed  Scooting anteriorly to EOB to have both feet planted on floor: stand by assistance  Sit to Supine: moderate assistance; to L side of bed    Sitting Balance at Edge of Bed:  Assistance Level Required: Supervision  Time: 15 min  Postural deviations noted: no deviations noted    Transfers:   Sit <> Stand Transfer: minimum assistance of 2 persons with rolling walker  Stand <> Sit Transfer: minimum assistance with rolling walker  x1 trials from EOB    Standing Balance:  Assistance Level Required: Contact Guard Assistance  Patient used: rolling walker  Time: 1 min  Postural deviations noted: no deviations noted      Gait: Deferred, patient not agreeable to leaving bedside today    Neuromuscular Re-ed:  Static and dynamic sitting balance at EOB  Postural control  Sitting tolerance  Static and dynamic standing balance at EOB  Standing tolerance  Upright posture  Weight shifting    Education:  Time provided for education, counseling and discussion of health disposition in regards to patient's current status  All questions answered within PT scope of practice and to patient's satisfaction  PT role in POC to address current functional deficits  Pt educated on proper body mechanics, safety techniques, and energy conservation with PT facilitation and cueing throughout session    Patient left HOB elevated with all lines intact, call button in reach, and RN Lillie present.    GOALS:   Multidisciplinary Problems       Physical Therapy Goals          Problem: Physical Therapy    Goal Priority Disciplines Outcome Goal Variances Interventions   Physical Therapy Goal     PT, PT/OT Ongoing, Progressing     Description: Goals to met by 1/21/2024    1. Supine to sit with Stand-by Assistance  2. Sit to supine with Stand-by Assistance  3. Rolling to Left and Right with  Stand-by Assistance.  4. Sit to stand transfer with Stand-by Assistance  5. Bed to chair transfer with Stand-by Assistance using Rolling Walker  6. Gait  x 75 feet with Stand-by Assistance using Rolling Walker   7. Ascend/Descend 6 inch curb step with Contact Guard Assistance using Rolling Walker.  8. Stand for 5 minutes with Stand-by Assistance using Rolling Walker  9. Lower extremity exercise program x15 reps per Instruction, with assistance as needed in order to facilitate improved postural control and improvement in functional independence                       History:     Past Medical History:   Diagnosis Date    RIGOBERTO (acute kidney injury) 10/7/2020    Anticoagulant long-term use     Aspirin    CHF (congestive heart failure)     Hyperlipidemia     Hypertension     Microcytic anemia 12/31/2023    NICM (nonischemic cardiomyopathy) 6/16/2020    Obesity     AMELIA (obstructive sleep apnea) 1/7/2022    Peripheral vascular disease, unspecified 2/1/2021       Past Surgical History:   Procedure Laterality Date    ESOPHAGOGASTRODUODENOSCOPY N/A 1/3/2024    Procedure: EGD (ESOPHAGOGASTRODUODENOSCOPY);  Surgeon: Vaughn Oliver MD;  Location: [facility] ENDO ([facility] FLR);  Service: Endoscopy;  Laterality: N/A;    INSERTION OF BIVENTRICULAR IMPLANTABLE CARDIOVERTER-DEFIBRILLATOR (ICD) N/A 02/13/2019    Procedure: INSERTION, ICD, BIVENTRICULAR;  Surgeon: Shailesh Eng MD;  Location: [facility] CATH LAB;  Service: Cardiology;  Laterality: N/A;  RN PRE OP 2-6-19  1ST CASE PER  [facility]. NOTIFIED [facility] THAT ANESTHESIA IS NOT PITO FOR 1ST CASE START-LO    INSERTION OF BIVENTRICULAR IMPLANTABLE CARDIOVERTER-DEFIBRILLATOR (ICD) N/A 09/28/2020    Procedure: INSERTION, ICD, BIVENTRICULAR;  Surgeon: Jim Kwong MD;  Location: [facility] EP LAB;  Service: Cardiology;  Laterality: N/A;  NICM, CRT-D, SJM,, MAC, DM, 3 Prep*Wearing LifeVest*    oral extraction  11/2018    TESTICLE SURGERY         Time Tracking:     PT Received On: 01/07/24  PT Start Time:  1002     PT Stop Time: 1034  PT Total Time (min): 32 min     Billable Minutes: Evaluation 1 procedure, Therapeutic Activity 15 min, and Neuromuscular Re-education 8 min    Tre Jaimes, PT, DPT  1/7/2024

## 2024-01-07 NOTE — PLAN OF CARE
Problem: Occupational Therapy  Goal: Occupational Therapy Goal  Description: Goals to be met by: 2/7/24 (1 month)      Patient will increase functional independence with ADLs by performing:    UE Dressing with Supervision.  LE Dressing with Supervision.  Grooming while standing at sink with Modified [place].  Toileting from toilet with Modified [place] for hygiene and clothing management.   Rolling to Bilateral with [place].   Supine to sit with [place].  Step transfer with Modified [place]  Toilet transfer to toilet with Modified [place].    Evaluated pt and established OT POC. Continue OT as tolerated.  Carmen Jaimes OT  1/7/2024    Outcome: Ongoing, Progressing

## 2024-01-07 NOTE — PT/OT/SLP EVAL
Occupational Therapy   Co-Evaluation and Co-Treatment    Name: Papito Bhakta  MRN: [id number]  Admitting Diagnosis: Acute hypoxemic respiratory failure  Recent Surgery: Procedure(s) (LRB):  EGD (ESOPHAGOGASTRODUODENOSCOPY) (N/A) 4 Days Post-Op    Recommendations:     Discharge Recommendations: High Intensity Therapy  Discharge Equipment Recommendations:  none  Barriers to discharge:  None    Assessment:     Papito Bhakta is a 68 y.o. male with a medical diagnosis of Acute hypoxemic respiratory failure.  Pt presents with decreased endurance and impaired mobility performance as limited by cardiovascular status and generalized weakness. Pt found upright in bed and agreeable for therapy. Pt limited today 2/2 generalized weakness and BLE pain (R>LLE). Pt stood with min A x2 using a RW however was unable to take true steps in room today. PTA, pt was using  crutches for gait with recent hospital admission. At this time, pt is not at his baseline and is a fall risk. Patient presents with fair participation and motivation to return to prior level of function with high intensity therapy.  The patient demonstrates appropriate endurance, strength, and pain control to participate in up to 3 hours or 15hrs of combined therapy post acute.   Performance deficits affecting function: weakness, impaired functional mobility, impaired endurance, gait instability, impaired self care skills, decreased lower extremity function, decreased upper extremity function, impaired balance, impaired cardiopulmonary response to activity.      Rehab Prognosis: Good; patient would benefit from acute skilled OT services to address these deficits and reach maximum level of function.       Plan:     Patient to be seen 4 x/week to address the above listed problems via self-care/home management, therapeutic activities, therapeutic exercises  Plan of Care Expires: 02/07/24  Plan of Care Reviewed with: patient    Subjective     Chief Complaint: feeling  globally weak  Patient/Family Comments/goals: to return to bed     Occupational Profile:  Living Environment: Pt lives with daughter and 9 yr old grandson in a H with no SHIRA. Pt has a shower chair inside a shower/tub combo for bathing.  Previous level of function: Pt uses axillary crutches for gait   Roles and Routines: Home dwelling; not driving. Enjoys watching TV   Equipment Used at Home: rollator, crutches, axillary, walker, rolling, shower chair, cane, straight (platform RW)  Assistance upon Discharge: daughter     Pain/Comfort:  Pain Rating 1: other (see comments) (chronic RLE pain not ranked)  Location - Side 1: Right  Location - Orientation 1: generalized  Location 1: leg  Pain Addressed 1: Reposition, Distraction, Cessation of Activity    Patients cultural, spiritual, Denominational conflicts given the current situation: no    Objective:   Co-treatment with PT for maximal pt participation, safety, and activity tolerance     Communicated with: RN prior to session.  Patient found HOB elevated with telemetry, friend catheter, peripheral IV upon OT entry to room.    General Precautions: Standard, fall, airborne, contact, droplet  Orthopedic Precautions: N/A  Braces: N/A  Respiratory Status: Room air    Occupational Performance:    Bed Mobility:    Patient completed Rolling/Turning to Right with minimum assistance  Patient completed Scooting/Bridging with minimum assistance  Patient completed Supine to Sit with minimum assistance  Patient completed Sit to Supine with moderate assistance    Functional Mobility/Transfers:  Patient completed Sit <> Stand Transfer with minimum assistance and of 2 persons  with  rolling walker   Functional Mobility: Pt stood with min A x2 using a RW     Activities of Daily Living:  Grooming: stand by assistance washing face and brushing teeth at EOB   Upper Body Dressing: moderate assistance donning gown at EOB   Lower Body Dressing: total assistance donning  socks in bed      Cognitive/Visual Perceptual:  Cognitive/Psychosocial Skills:     -       Oriented to: Person, Place, Time, and Situation   -       Follows Commands/attention:Follows multistep  commands  -       Communication: clear/fluent  -       Memory: No Deficits noted  -       Safety awareness/insight to disability: intact   -       Mood/Affect/Coping skills/emotional control: Appropriate to situation    Physical Exam:  Balance:    -       demo good sitting balance, kyphotic standing balance using a RW   Upper Extremity Range of Motion:     -       Right Upper Extremity: WFL  -       Left Upper Extremity: WFL  Upper Extremity Strength:    -       Right Upper Extremity: WFL  -       Left Upper Extremity: WFL   Strength:    -       Right Upper Extremity: WFL  -       Left Upper Extremity: WFL    AMPAC 6 Click ADL:  AMPAC Total Score: 18    Treatment & Education:  Pt educated on role of occupational therapy, POC, and safety during ADLs and functional mobility. Pt and OT discussed importance of safe, continued mobility to optimize daily living skills. Pt verbalized understanding.     White board updated during session. Pt given instruction to call for medical staff/nurse for assistance.        Patient left HOB elevated with all lines intact, call button in reach, RN notified, and RN mehnaz  present    GOALS:   Multidisciplinary Problems       Occupational Therapy Goals          Problem: Occupational Therapy    Goal Priority Disciplines Outcome Interventions   Occupational Therapy Goal     OT, PT/OT Ongoing, Progressing    Description: Goals to be met by: 2/7/24 (1 month)      Patient will increase functional independence with ADLs by performing:    UE Dressing with Supervision.  LE Dressing with Supervision.  Grooming while standing at sink with Modified [place].  Toileting from toilet with Modified [place] for hygiene and clothing management.   Rolling to Bilateral with [place].   Supine to sit with  [place].  Step transfer with Modified [place]  Toilet transfer to toilet with Modified [place].                       History:     Past Medical History:   Diagnosis Date    RIGOBERTO (acute kidney injury) 10/7/2020    Anticoagulant long-term use     Aspirin    CHF (congestive heart failure)     Hyperlipidemia     Hypertension     Microcytic anemia 12/31/2023    NICM (nonischemic cardiomyopathy) 6/16/2020    Obesity     AMELIA (obstructive sleep apnea) 1/7/2022    Peripheral vascular disease, unspecified 2/1/2021         Past Surgical History:   Procedure Laterality Date    ESOPHAGOGASTRODUODENOSCOPY N/A 1/3/2024    Procedure: EGD (ESOPHAGOGASTRODUODENOSCOPY);  Surgeon: Vaughn Oliver MD;  [facility]  Service: Endoscopy;  Laterality: N/A;    INSERTION OF BIVENTRICULAR IMPLANTABLE CARDIOVERTER-DEFIBRILLATOR (ICD) N/A 02/13/2019    Procedure: INSERTION, ICD, BIVENTRICULAR;  Surgeon: Shailesh Eng MD;  Location: [facility] CATH LAB;  Service: Cardiology;  Laterality: N/A;  RN PRE OP 2-6-19  1ST CASE PER  [facility]. NOTIFIED [facility] THAT ANESTHESIA IS NOT PITO FOR 1ST CASE START-LO    INSERTION OF BIVENTRICULAR IMPLANTABLE CARDIOVERTER-DEFIBRILLATOR (ICD) N/A 09/28/2020    Procedure: INSERTION, ICD, BIVENTRICULAR;  Surgeon: Jim Kwong MD;  Location: [facility] EP LAB;  Service: Cardiology;  Laterality: N/A;  NICM, CRT-D, SJM,, MAC, DM, 3 Prep*Wearing LifeVest*    oral extraction  11/2018    TESTICLE SURGERY         Time Tracking:     OT Date of Treatment: 01/07/24  OT Start Time: 1002  OT Stop Time: 1034  OT Total Time (min): 32 min    Billable Minutes:Evaluation 9 min  Self Care/Home Management 23 min    1/7/2024

## 2024-01-07 NOTE — PROGRESS NOTES
Dmitry Colon - Cardiology [facility] Medicine  Progress Note    Patient Name: Papito Bhakta  MRN: [id number]  Patient Class: IP- Inpatient   Admission Date: 12/29/2023  Length of Stay: 5 days  Attending Physician: Shell Medrano*  Primary Care Provider: Brynn To MD        Subjective:     Principal Problem:Acute hypoxemic respiratory failure        HPI:  Papito Bhakta is a 68 y.o. male with NICM, combined CHF(11/2023 EF 10 -15%, G3DD) s/p ICD placement, CKD, HLD, HTN, and AMELIA who presents for bilateral lower extremity swelling and shortness of breath. Patient reports he has been having worsening shortness of breath for the past 6 months. He had acutely worsening SOB today where he described becoming profoundly dyspneic on exertion. He reported taking 2 of his 80 mg Lasix this morning with subsequent minimal urine output and minimal improvement in his SOB. He reports SOB aggravated with lying down and he requires frequent positional changes to keep comfortable. He reports additional poor appetite and urine output for the past week although he reports he has been drinking well. He had 2 episodes of nausea/vomiting this past week as well. He denies fever/chills, chest pain, abdominal pain, recent illness, medication changes. Patient reports adherence with all medications. He reports he lives with his daughter who helps him with his medications and healthcare.    Additionally he reports chronic leg pain from a chronic RLE wound. He went to wound care yesterday where dressings were changed and he was told they were healing well. He has bilateral lower extremity edema R>L that is typical for him and he denies recent worsening.    Overview/Hospital Course:  Pt admitted to hospital medicine for acute heart failure exacerbation and inability to swallow solids for 3 months duration. Initiated on IV lasix. SLP evaluated the patient and determined that he could tolerate liquids. He had a new leukocytosis  noted on 12/31, work up significant for COVID positive. He completed remdesivir, steroids held in order to prevent worsening fluid retention, and was able to be weaned to room oxygen by 1/4. Overnight 12/31, patient becane tachycardic and hypotensive (asymptomatic). Cardiology called and concluded that he was in atrial fibrillation with RVR and he was briefly changed from oral amiodarone to IV amiodarone for one day before resuming his oral dosing. PM interrogated, noted to be functioning accurately. Cardiology signed off. IV lasix increased from pushes to gtt. Urine output did not significantly increase, bladder scan with 325ml urine. Mcmahon placed.     GI was consulted for his ongoing dysphagia. With his ongoing aggressive diuresis, COVID infection, and episode of hemodynamic stability, the EGD was deferred until he becomes more euvolemic.     Transient episode of increased respiratory distress 1/6. EKG without significant changes from prior. Troponin negative. CXR showed stability from prior imaging.    During his admission, noted to have chronic bilirubinemia, assumed likely secondary to congestive hepatopathy. RUQ US without biliary obstruction. Hemolysis labs negative. Tbili continued to uptrend despite improvement in fluid status and renal function. Also noted to have worsening thrombocytopenia, despite normal LFTs. He has chronic lower extremity leg wounds secondary to peripheral vascular disease. Wound care was consulted and assisted in managing the wounds during his admission. He has outpatient follow-up scheduled with vascular surgery at the end of February    Interval History: Overnight patient had 14 beat run of Stamped at approximately 4:44am but remained asymptomatic. His potassium was 3.7 and he received repletion. He had good oxygen saturation 96-98% on room air overnight and says his cough is improving. Still has external IJ distension and pitting edema of both legs, but edema is improving with  increased wrinkles on both legs, R leg more swollen than left. He has some complaints of possible delirium stating he has to look out the window to reorient himself sometimes, so overnight delirium precautions were placed and window was opened. Continued improvement and patient had 2L urine output on lasix drip, bringing him to 7.6L net negative on admission thus far. He is tolerating the Mcmahon and output appears yellowish and transparent. Despite his aggressive diuresis and high dose lasix dosing, he still has clinical signs of fluid overload. Considering cardiology consult if there is not improvement tomorrow and improved UOP. BNP was repeated at 2503, essentially stable since admission. He appears extremely weak and debilitated, struggling to move his lower legs. He reports being independent prior to admission. He will need PT/OT consultation to determine disposition as he may now require acute therapies at discharge.     Review of Systems  Objective:     Vital Signs (Most Recent):  Temp: 97.4 °F (36.3 °C) (01/07/24 0228)  Pulse: 77 (01/07/24 0514)  Resp: 18 (01/07/24 0228)  BP: 94/62 (01/07/24 0228)  SpO2: 96 % (01/07/24 0228) Vital Signs (24h Range):  Temp:  [97.4 °F (36.3 °C)-98.7 °F (37.1 °C)] 97.4 °F (36.3 °C)  Pulse:  [72-91] 77  Resp:  [18-20] 18  SpO2:  [91 %-98 %] 96 %  BP: ()/(60-77) 94/62     Weight: 107.1 kg (236 lb 1.8 oz)  Body mass index is 28 kg/m².    Intake/Output Summary (Last 24 hours) at 1/7/2024 0818  Last data filed at 1/7/2024 0415  Gross per 24 hour   Intake 720 ml   Output 2000 ml   Net -1280 ml         Physical Exam  Vitals and nursing note reviewed.   Constitutional:       Appearance: He is ill-appearing.   HENT:      Head: Normocephalic and atraumatic.      Mouth/Throat:      Mouth: Mucous membranes are dry.      Comments: Cracked lips and dry oral mucose  Eyes:      General: Scleral icterus present.      Extraocular Movements: Extraocular movements intact.      Comments:  Anisocoria   Cardiovascular:      Rate and Rhythm: Normal rate and regular rhythm.      Heart sounds:      Gallop present.      Comments: External IJ distension  JVD  Pulmonary:      Effort: No respiratory distress.      Breath sounds: Normal breath sounds.      Comments: Lungs clear to auscultation   Abdominal:      General: Abdomen is flat. There is no distension.      Palpations: Abdomen is soft.      Tenderness: There is no abdominal tenderness.   Musculoskeletal:         General: Tenderness (LLE tenderness) present.      Cervical back: Normal range of motion.      Right lower leg: Edema present.      Left lower leg: Edema present.      Comments: Bilateral wrinkles of lower extremities.  Right leg more swollen than left   Skin:     General: Skin is warm and dry.      Comments: Left leg wound uncovered and scabbed over.  Right leg wound with fresh bandages.   Neurological:      Mental Status: He is alert and oriented to person, place, and time.      Motor: Weakness present.   Psychiatric:         Mood and Affect: Mood normal.         Behavior: Behavior normal.             Significant Labs: All pertinent labs within the past 24 hours have been reviewed.  CBC:   Recent Labs   Lab 01/06/24  0630 01/06/24  1244   WBC 9.51 9.33   HGB 13.7* 13.5*   HCT 39.6* 39.5*   PLT 82* 83*     CMP:   Recent Labs   Lab 01/05/24  1958 01/06/24  0630 01/06/24  1627    138 138   K 3.9 3.5 3.7    98 99   CO2 25 27 26   * 98 121*   BUN 57* 53* 60*   CREATININE 1.7* 1.4 1.6*   CALCIUM 8.9 9.0 8.8   PROT  --  6.5  --    ALBUMIN  --  2.4*  --    BILITOT  --  7.2*  --    ALKPHOS  --  117  --    AST  --  49*  --    ALT  --  39  --    ANIONGAP 13 13 13     Magnesium:   Recent Labs   Lab 01/06/24  0630   MG 2.3       Significant Imaging: I have reviewed all pertinent imaging results/findings within the past 24 hours.    Assessment/Plan:      * Acute hypoxemic respiratory failure  Patient with Hypoxic Respiratory failure  which is Acute.  he is not on home oxygen. Supplemental oxygen was provided and noted-      Patient with persistent O2 requirement of 2-3 LPM. Largest contribution likely due to fluid overload in setting of heart failure exacerbation and possible urinary retention. Also found to be COVID positive, s/p course of remdesivir. With his persistent hypoxia, possible that he is developing a post viral pneumonia, or an aspiration pneumonia in setting of dysphagia.   Troponin and repeat EKG WNL, concern for ACS low.   He does have baseline arrhythmia with PM in place, recently interrogated. No AS on recent TTE.      - Continue lasix gtt (see acute on chronic HF)  - CXR showing similar to prior, no significant worsening.   - Low threshold to add antibiotic coverage for PNA  - Wean O2 as tolerated  - BNP repeated and still elevated at 2503.  - Consider cardiology consult 1/8/23 if UOP does not improve and he does not improve clinically.   - PT/OT consulted, as deconditioning may also be contributing. Nursing communication placed to have patient out of bed and in chair with each meal      CKD (chronic kidney disease)  Creatine stable for now. BMP reviewed- noted Estimated Creatinine Clearance: 68.8 mL/min (based on SCr of 1.4 mg/dL). according to latest data. Based on current GFR, CKD stage is stage 3 - GFR 30-59.  Monitor UOP and serial BMP and adjust therapy as needed. Renally dose meds. Avoid nephrotoxic medications and procedures.    COVID-19  COVID positive, noted 12/31    - Finished remdesivir course yesterday  - Hold steroids at this time, hesitant to exacerbate fluid retention     Microcytic anemia  Iron studies notable for Fe 26 and sat iron 8%. TIBC, ferritin, transferrin wnl. Likely iron deficiency anemia.    - Daily iron tablet  - Daily CBCs  - Consider IV iron supplementation     Dysphagia  Reports a 3 month history of inability to tolerate solid foods. Notable vomiting immediatly with swallowing food/liquid. No  complaints of nausea. SLP has assessed him, can tolerate liquids.     - Continue liquid diet, Boost TID  - GI cancelled EGD and signing off. Will re-consult once patient is more euvolemic and hemodynamically stable.       Pupil asymmetry  Notable asymetry on pupils by patient. Chart review shows left orbital trauma in 2021 with notes concerned for dilation and vision changes. When talking to daughter, this is chronic. States no vision changes or neuro symptoms whatsoever.     Serum total bilirubin elevated  Continues to increase. Direct bilirubinemia. No signs of hemolysis.    - Daily CMPs  - Appears chronic since November. Previously reported as secondary to congestive hepatopathy however remaining LFTs likely WNL.   - US Liver with doppler showing: Bidirectional portal vein flow may be seen with right heart failure, tricuspid regurgitation, or portal hypertension. Evidence of volume overload with distended IVC and hepatic veins.  Pulsatile flow through the hepatic veins (also possibly suggesting tricuspid regurgitation). Suspected hepatic steatosis. No evidence of biliary obstruction.  Possible genetic condition      Acute on chronic combined systolic and diastolic CHF (congestive heart failure)  Patient is identified as having Combined Systolic and Diastolic heart failure that is Acute on chronic. CHF is currently uncontrolled due to Pulmonary edema/pleural effusion on CXR. Latest ECHO performed and demonstrates- Results for orders placed during the hospital encounter of 11/10/23    Echo    Interpretation Summary    Left Ventricle: The left ventricle is severely dilated. Mildly increased wall thickness. There is mild concentric hypertrophy. Severe global hypokinesis present. There is severely reduced systolic function with a visually estimated ejection fraction of 10 -15%. Grade III diastolic dysfunction.    Right Ventricle: Severe right ventricular enlargement. Systolic function is severely reduced.    Mitral  Valve: There is no stenosis. There is mild to moderate regurgitation with a centrally directed jet.    Tricuspid Valve: There is mild to moderate regurgitation.    Pulmonary Artery: The estimated pulmonary artery systolic pressure is 67 mmHg.  .Last BNP reviewed- and noted below   Recent Labs   Lab 01/01/24  0551   BNP 2,684*         Presented for acute on chronic SOB with associated low UOP. Afebrile HDS. BNP 3,067, Troponin 0.031. CXR with cardiomegaly, vascular congestion, and edema. Received 100 of Lasix in the ED.    - RIP positive for Covid, treatment completed.  - Good response with IV lasix 80 TID, now converted to lasix drip at 15mg/hour   - continue home Jardiance   - Beta blocker/aldactone held given soft pressures, resume when appropriate  - Cardiac diet with Fluid restriction at 1.5L with strict I/Os and daily STANDING weights  - Check Electrolytes, keep Mag >2 & K+ >4  - SCDs, Ambulate as tolerated    - Strict I&Os, Daily standing weights  - Review Low-salt diet and enforce medication adherence on discharge    Acute renal failure superimposed on stage 3a chronic kidney disease  Creatinine 2.1 on admit, baseline around 1.4. Likely prerenal etiology given urine sodium and FENa vs progression of CKD. Improving with increased diuresis     Recent Labs     01/05/24  0525 01/05/24 1958 01/06/24  0630   BUN 60* 57* 53*   CREATININE 1.6* 1.7* 1.4         Estimated Creatinine Clearance: 68.8 mL/min (based on SCr of 1.4 mg/dL).  Improving    - Renal US: renal cysts, no hydronephrosis  - Urine Na: 14, Pr/Cr: 0.72; FENa 0.2%  - Monitor urine output and serial BMP and adjust therapy as needed.   - Strict I&Os and daily weights   - Avoid nephrotoxic agents such as NSAIDs, gadolinium and IV radiocontrast.  - Renally dose meds to current GFR.  - Maintain MAP > 65.  - Nephrology referral outpatient    AMELIA (obstructive sleep apnea)  Carried diagnosis of AMELIA per chart, however he does not sleep with CPAP at home and  declines while here      Peripheral vascular disease, unspecified  Patient with chronic lower leg wounds that recently established with wound care. He is set to follow up with vascular surgery outpatient at the end of February.    Appreciate wound care recommendations  Continue diuresis       NICM (nonischemic cardiomyopathy)  ASA 325mg qd per home medication list. Reason for this dose unclear.      Biventricular ICD (implantable cardioverter-defibrillator) in place  Stable on admission, no acute issues, he denies discharge. QTc chronically prolonged. Continue home amiodarone for NSVT prevention. Monitor on telemetry     - 12/31, noted to have AF RVR with hypotension. Started on amio gtt. Per report, pacemaker malfunctioning. EP consulted for formal evaluation   - Restarted on home oral amio  - Device interrogated, no complications. EP has signed off    Hyperlipidemia  Stable. Continue Home Pravastatin.        Essential hypertension  Chronic, controlled. Latest blood pressure and vitals reviewed-     Temp:  [97.4 °F (36.3 °C)-98.7 °F (37.1 °C)]   Pulse:  [73-91]   Resp:  [18-22]   BP: ()/(58-77)   SpO2:  [93 %-100 %] .   Home meds for hypertension were reviewed and noted below.   Hypertension Medications               furosemide (LASIX) 80 MG tablet TAKE 1 TABLET EVERY DAY    metoprolol succinate (TOPROL-XL) 50 MG 24 hr tablet TAKE 1 TABLET EVERY DAY    spironolactone (ALDACTONE) 25 MG tablet TAKE 1/2 TABLET (12.5 MG TOTAL) ONCE DAILY. HOLD IF SYSTOLIC BLOOD PRESSURE IS LESS THAN 110            While in the hospital, will manage blood pressure as follows; Adjust home antihypertensive regimen as follows- Holding in setting of borderline pressures in setting of new diuresis     Will utilize p.r.n. blood pressure medication only if patient's blood pressure greater than 180/110 and he develops symptoms such as worsening chest pain or shortness of breath.      VTE Risk Mitigation (From admission, onward)            Ordered     heparin (porcine) injection 5,000 Units  Every 8 hours         12/29/23 1759     IP VTE HIGH RISK PATIENT  Once         12/29/23 1759     Place sequential compression device  Until discontinued         12/29/23 1759                    Discharge Planning   MARIIA: 1/9/2024     Code Status: Full Code   Is the patient medically ready for discharge?: No    Reason for patient still in hospital (select all that apply): Treatment  Discharge Plan A: Home with family                  Rob Trujillo MD  Department of Hospital Medicine   [facility] - Cardiology Stepdown

## 2024-01-07 NOTE — SUBJECTIVE & OBJECTIVE
Interval History: Overnight patient had 14 beat run of [employer] at approximately 4:44am but remained asymptomatic. His potassium was 3.7 and he received repletion. He had good oxygen saturation 96-98% on room air overnight and says his cough is improving. Still has external IJ distension and pitting edema of both legs, but edema is improving with increased wrinkles on both legs, R leg more swollen than left. He has some complaints of possible delirium stating he has to look out the window to reorient himself sometimes, so overnight delirium precautions were placed and window was opened. Continued improvement and patient had 2L urine output on lasix drip, bringing him to 7.6L net negative on admission thus far. He is tolerating the Mcmahon and output appears yellowish and transparent. Despite his aggressive diuresis and high dose lasix dosing, he still has clinical signs of fluid overload. Considering cardiology consult if there is not improvement tomorrow and improved UOP. BNP was repeated at 2503, essentially stable since admission. He appears extremely weak and debilitated, struggling to move his lower legs. He reports being independent prior to admission. He will need PT/OT consultation to determine disposition as he may now require acute therapies at discharge.     Review of Systems  Objective:     Vital Signs (Most Recent):  Temp: 97.4 °F (36.3 °C) (01/07/24 0228)  Pulse: 77 (01/07/24 0514)  Resp: 18 (01/07/24 0228)  BP: 94/62 (01/07/24 0228)  SpO2: 96 % (01/07/24 0228) Vital Signs (24h Range):  Temp:  [97.4 °F (36.3 °C)-98.7 °F (37.1 °C)] 97.4 °F (36.3 °C)  Pulse:  [72-91] 77  Resp:  [18-20] 18  SpO2:  [91 %-98 %] 96 %  BP: ()/(60-77) 94/62     Weight: 107.1 kg (236 lb 1.8 oz)  Body mass index is 28 kg/m².    Intake/Output Summary (Last 24 hours) at 1/7/2024 0818  Last data filed at 1/7/2024 0415  Gross per 24 hour   Intake 720 ml   Output 2000 ml   Net -1280 ml         Physical Exam  Vitals and nursing note  reviewed.   Constitutional:       Appearance: He is ill-appearing.   HENT:      Head: Normocephalic and atraumatic.      Mouth/Throat:      Mouth: Mucous membranes are dry.      Comments: Cracked lips and dry oral mucose  Eyes:      General: Scleral icterus present.      Extraocular Movements: Extraocular movements intact.      Comments: Anisocoria   Cardiovascular:      Rate and Rhythm: Normal rate and regular rhythm.      Heart sounds:      Gallop present.      Comments: External IJ distension  JVD  Pulmonary:      Effort: No respiratory distress.      Breath sounds: Normal breath sounds.      Comments: Lungs clear to auscultation   Abdominal:      General: Abdomen is flat. There is no distension.      Palpations: Abdomen is soft.      Tenderness: There is no abdominal tenderness.   Musculoskeletal:         General: Tenderness (LLE tenderness) present.      Cervical back: Normal range of motion.      Right lower leg: Edema present.      Left lower leg: Edema present.      Comments: Bilateral wrinkles of lower extremities.  Right leg more swollen than left   Skin:     General: Skin is warm and dry.      Comments: Left leg wound uncovered and scabbed over.  Right leg wound with fresh bandages.   Neurological:      Mental Status: He is alert and oriented to person, place, and time.      Motor: Weakness present.   Psychiatric:         Mood and Affect: Mood normal.         Behavior: Behavior normal.             Significant Labs: All pertinent labs within the past 24 hours have been reviewed.  CBC:   Recent Labs   Lab 01/06/24  0630 01/06/24  1244   WBC 9.51 9.33   HGB 13.7* 13.5*   HCT 39.6* 39.5*   PLT 82* 83*     CMP:   Recent Labs   Lab 01/05/24 1958 01/06/24  0630 01/06/24  1627    138 138   K 3.9 3.5 3.7    98 99   CO2 25 27 26   * 98 121*   BUN 57* 53* 60*   CREATININE 1.7* 1.4 1.6*   CALCIUM 8.9 9.0 8.8   PROT  --  6.5  --    ALBUMIN  --  2.4*  --    BILITOT  --  7.2*  --    ALKPHOS  --  117   --    AST  --  49*  --    ALT  --  39  --    ANIONGAP 13 13 13     Magnesium:   Recent Labs   Lab 01/06/24  0630   MG 2.3       Significant Imaging: I have reviewed all pertinent imaging results/findings within the past 24 hours.

## 2024-01-07 NOTE — ASSESSMENT & PLAN NOTE
Patient with Hypoxic Respiratory failure which is Acute.  he is not on home oxygen. Supplemental oxygen was provided and noted-      Patient with persistent O2 requirement of 2-3 LPM. Largest contribution likely due to fluid overload in setting of heart failure exacerbation and possible urinary retention. Also found to be COVID positive, s/p course of remdesivir. With his persistent hypoxia, possible that he is developing a post viral pneumonia, or an aspiration pneumonia in setting of dysphagia.   Troponin and repeat EKG WNL, concern for ACS low.   He does have baseline arrhythmia with PM in place, recently interrogated. No AS on recent TTE.      - Continue lasix gtt (see acute on chronic HF)  - CXR showing similar to prior, no significant worsening.   - Low threshold to add antibiotic coverage for PNA  - Wean O2 as tolerated  - BNP repeated and still elevated at 2503.  - Consider cardiology consult 1/8/23 if UOP does not improve and he does not improve clinically.   - PT/OT consulted, as deconditioning may also be contributing. Nursing communication placed to have patient out of bed and in chair with each meal

## 2024-01-07 NOTE — PLAN OF CARE
Problem: Physical Therapy  Goal: Physical Therapy Goal  Description: Goals to met by 1/21/2024    1. Supine to sit with Stand-by Assistance  2. Sit to supine with Stand-by Assistance  3. Rolling to Left and Right with Stand-by Assistance.  4. Sit to stand transfer with Stand-by Assistance  5. Bed to chair transfer with Stand-by Assistance using Rolling Walker  6. Gait  x 75 feet with Stand-by Assistance using Rolling Walker   7. Ascend/Descend 6 inch curb step with Contact Guard Assistance using Rolling Walker.  8. Stand for 5 minutes with Stand-by Assistance using Rolling Walker  9. Lower extremity exercise program x15 reps per Instruction, with assistance as needed in order to facilitate improved postural control and improvement in functional independence    PT Eval: 1/7/2024

## 2024-01-08 LAB
ALBUMIN SERPL BCP-MCNC: 2.3 G/DL (ref 3.5–5.2)
ALP SERPL-CCNC: 111 U/L (ref 55–135)
ALT SERPL W/O P-5'-P-CCNC: 34 U/L (ref 10–44)
ANION GAP SERPL CALC-SCNC: 15 MMOL/L (ref 8–16)
AST SERPL-CCNC: 39 U/L (ref 10–40)
BILIRUB SERPL-MCNC: 9.5 MG/DL (ref 0.1–1)
BUN SERPL-MCNC: 51 MG/DL (ref 8–23)
CALCIUM SERPL-MCNC: 9.1 MG/DL (ref 8.7–10.5)
CHLORIDE SERPL-SCNC: 97 MMOL/L (ref 95–110)
CO2 SERPL-SCNC: 24 MMOL/L (ref 23–29)
CREAT SERPL-MCNC: 1.4 MG/DL (ref 0.5–1.4)
ERYTHROCYTE [DISTWIDTH] IN BLOOD BY AUTOMATED COUNT: 22.5 % (ref 11.5–14.5)
EST. GFR  (NO RACE VARIABLE): 54.7 ML/MIN/1.73 M^2
GLUCOSE SERPL-MCNC: 91 MG/DL (ref 70–110)
HCT VFR BLD AUTO: 41.6 % (ref 40–54)
HGB BLD-MCNC: 13.8 G/DL (ref 14–18)
MAGNESIUM SERPL-MCNC: 2.3 MG/DL (ref 1.6–2.6)
MCH RBC QN AUTO: 23.1 PG (ref 27–31)
MCHC RBC AUTO-ENTMCNC: 33.2 G/DL (ref 32–36)
MCV RBC AUTO: 70 FL (ref 82–98)
PATH REV BLD -IMP: NORMAL
PHOSPHATE SERPL-MCNC: 2.9 MG/DL (ref 2.7–4.5)
PLATELET # BLD AUTO: 91 K/UL (ref 150–450)
PMV BLD AUTO: ABNORMAL FL (ref 9.2–12.9)
POTASSIUM SERPL-SCNC: 4.3 MMOL/L (ref 3.5–5.1)
PROT SERPL-MCNC: 6.4 G/DL (ref 6–8.4)
RBC # BLD AUTO: 5.97 M/UL (ref 4.6–6.2)
SODIUM SERPL-SCNC: 136 MMOL/L (ref 136–145)
WBC # BLD AUTO: 8.17 K/UL (ref 3.9–12.7)

## 2024-01-08 PROCEDURE — 63600175 PHARM REV CODE 636 W HCPCS: Performed by: HOSPITALIST

## 2024-01-08 PROCEDURE — 97535 SELF CARE MNGMENT TRAINING: CPT | Mod: CO

## 2024-01-08 PROCEDURE — 85027 COMPLETE CBC AUTOMATED: CPT | Performed by: STUDENT IN AN ORGANIZED HEALTH CARE EDUCATION/TRAINING PROGRAM

## 2024-01-08 PROCEDURE — 80053 COMPREHEN METABOLIC PANEL: CPT | Performed by: STUDENT IN AN ORGANIZED HEALTH CARE EDUCATION/TRAINING PROGRAM

## 2024-01-08 PROCEDURE — 51702 INSERT TEMP BLADDER CATH: CPT

## 2024-01-08 PROCEDURE — 99222 1ST HOSP IP/OBS MODERATE 55: CPT | Mod: GC,,, | Performed by: INTERNAL MEDICINE

## 2024-01-08 PROCEDURE — 36415 COLL VENOUS BLD VENIPUNCTURE: CPT | Performed by: STUDENT IN AN ORGANIZED HEALTH CARE EDUCATION/TRAINING PROGRAM

## 2024-01-08 PROCEDURE — 97110 THERAPEUTIC EXERCISES: CPT

## 2024-01-08 PROCEDURE — 84100 ASSAY OF PHOSPHORUS: CPT | Performed by: STUDENT IN AN ORGANIZED HEALTH CARE EDUCATION/TRAINING PROGRAM

## 2024-01-08 PROCEDURE — 27000207 HC ISOLATION

## 2024-01-08 PROCEDURE — 20600001 HC STEP DOWN PRIVATE ROOM

## 2024-01-08 PROCEDURE — 97530 THERAPEUTIC ACTIVITIES: CPT

## 2024-01-08 PROCEDURE — 25000242 PHARM REV CODE 250 ALT 637 W/ HCPCS

## 2024-01-08 PROCEDURE — 97530 THERAPEUTIC ACTIVITIES: CPT | Mod: CO

## 2024-01-08 PROCEDURE — 25000003 PHARM REV CODE 250: Performed by: STUDENT IN AN ORGANIZED HEALTH CARE EDUCATION/TRAINING PROGRAM

## 2024-01-08 PROCEDURE — 83735 ASSAY OF MAGNESIUM: CPT | Performed by: STUDENT IN AN ORGANIZED HEALTH CARE EDUCATION/TRAINING PROGRAM

## 2024-01-08 PROCEDURE — 63600175 PHARM REV CODE 636 W HCPCS

## 2024-01-08 PROCEDURE — 63600175 PHARM REV CODE 636 W HCPCS: Performed by: STUDENT IN AN ORGANIZED HEALTH CARE EDUCATION/TRAINING PROGRAM

## 2024-01-08 PROCEDURE — 25000003 PHARM REV CODE 250

## 2024-01-08 RX ORDER — FUROSEMIDE 10 MG/ML
80 INJECTION INTRAMUSCULAR; INTRAVENOUS ONCE
Status: COMPLETED | OUTPATIENT
Start: 2024-01-08 | End: 2024-01-08

## 2024-01-08 RX ORDER — DOBUTAMINE HYDROCHLORIDE 400 MG/100ML
2.5 INJECTION INTRAVENOUS CONTINUOUS
Status: DISCONTINUED | OUTPATIENT
Start: 2024-01-08 | End: 2024-01-18 | Stop reason: HOSPADM

## 2024-01-08 RX ADMIN — PRAVASTATIN SODIUM 80 MG: 40 TABLET ORAL at 09:01

## 2024-01-08 RX ADMIN — ACETAMINOPHEN 650 MG: 325 TABLET ORAL at 10:01

## 2024-01-08 RX ADMIN — HEPARIN SODIUM 5000 UNITS: 5000 INJECTION INTRAVENOUS; SUBCUTANEOUS at 05:01

## 2024-01-08 RX ADMIN — AMIODARONE HYDROCHLORIDE 200 MG: 200 TABLET ORAL at 09:01

## 2024-01-08 RX ADMIN — EMPAGLIFLOZIN 10 MG: 10 TABLET, FILM COATED ORAL at 09:01

## 2024-01-08 RX ADMIN — FUROSEMIDE 15 MG/HR: 10 INJECTION, SOLUTION INTRAMUSCULAR; INTRAVENOUS at 05:01

## 2024-01-08 RX ADMIN — MUPIROCIN: 20 OINTMENT TOPICAL at 09:01

## 2024-01-08 RX ADMIN — FUROSEMIDE 80 MG: 10 INJECTION, SOLUTION INTRAVENOUS at 05:01

## 2024-01-08 RX ADMIN — DOBUTAMINE HYDROCHLORIDE 2.5 MCG/KG/MIN: 400 INJECTION INTRAVENOUS at 05:01

## 2024-01-08 RX ADMIN — HEPARIN SODIUM 5000 UNITS: 5000 INJECTION INTRAVENOUS; SUBCUTANEOUS at 06:01

## 2024-01-08 RX ADMIN — ASPIRIN 325 MG: 325 TABLET, COATED ORAL at 09:01

## 2024-01-08 NOTE — PT/OT/SLP PROGRESS
Physical Therapy Co-Treatment    Patient Name:  Papito Bhakta   MRN:  [id number]    Recommendations:     Discharge Recommendations: High Intensity Therapy  Discharge Equipment Recommendations: wheelchair  Barriers to discharge:  inc level of assist required    Assessment:     Papito Bhakta is a 68 y.o. male admitted with a medical diagnosis of Acute hypoxemic respiratory failure.  He presents with the following impairments/functional limitations: weakness, impaired endurance, impaired self care skills, impaired functional mobility, impaired balance, gait instability, decreased upper extremity function, decreased lower extremity function, decreased safety awareness, pain, impaired cardiopulmonary response to activity. Pt able to tolerate more treatment today, progressed to ambulating 4' w/ assistance. Patient has demonstrated sufficient progression to warrant high intensity therapy evidenced by objectives noted below.    Rehab Prognosis: Good; patient would benefit from acute skilled PT services to address these deficits and reach maximum level of function.    Recent Surgery: Procedure(s) (LRB):  EGD (ESOPHAGOGASTRODUODENOSCOPY) (N/A) 5 Days Post-Op    Plan:     During this hospitalization, patient to be seen 4 x/week to address the identified rehab impairments via gait training, therapeutic activities, therapeutic exercises, neuromuscular re-education and progress toward the following goals:    Plan of Care Expires:  02/07/24    Subjective     Chief Complaint: pain in ankle  Patient/Family Comments/goals: pt and dtr upset about how sticky the floor of his room is as it makes it difficult to walk (RN and charge RN aware)  Pain/Comfort:  Pain Rating 1: 10/10  Location - Side 1: Right  Location 1: ankle  Pain Addressed 1: Reposition, Distraction  Pain Rating Post-Intervention 1: 10/10      Objective:     Communicated with RN prior to session.  Patient found HOB elevated with telemetry, pulse ox (continuous), friend  catheter, peripheral IV upon PT entry to room. Co-tx w/ OT 2/2 suspected pt complexity and requirement of 2 skilled therapists to assist in order to maximize pt treatment     General Precautions: Standard, airborne, contact, droplet, fall  Orthopedic Precautions: N/A  Braces: N/A  Respiratory Status: Room air     Functional Mobility:  Bed Mobility:     Supine to Sit: stand by assistance  Transfers:     Sit to Stand:  minimum assistance and of 2 persons with rolling walker  Bed to Chair: minimum assistance and of 2 persons with  rolling walker  using  Step Transfer  Gait: 4' w/ Kimo x2 RW w/ fwd flexed posture, inc distance from RW, and dec step length; Gait training w/ verbal cueing for proper use of AD, step length, postural control, safety awareness, width of DANTE, and proximity to AD   Balance: EOB sitting balance supervision      AM-PAC 6 CLICK MOBILITY  Turning over in bed (including adjusting bedclothes, sheets and blankets)?: 3  Sitting down on and standing up from a chair with arms (e.g., wheelchair, bedside commode, etc.): 2  Moving from lying on back to sitting on the side of the bed?: 3  Moving to and from a bed to a chair (including a wheelchair)?: 2  Need to walk in hospital room?: 2  Climbing 3-5 steps with a railing?: 1  Basic Mobility Total Score: 13       Treatment & Education:  Pt educated on PT POC/goals, d/c recs, and continued treatment. All questions answered and pt in agreement w/ POC.  Sitting balance w/ cueing for upright posture, midline orientation, and safety awareness while performing ADLs w/ OT    Patient left up in chair with all lines intact, call button in reach, RN notified, and dtr present..    GOALS:   Multidisciplinary Problems       Physical Therapy Goals          Problem: Physical Therapy    Goal Priority Disciplines Outcome Goal Variances Interventions   Physical Therapy Goal     PT, PT/OT Ongoing, Progressing     Description: Goals to met by 1/21/2024    1. Supine to sit with  Stand-by Assistance  2. Sit to supine with Stand-by Assistance  3. Rolling to Left and Right with Stand-by Assistance.  4. Sit to stand transfer with Stand-by Assistance  5. Bed to chair transfer with Stand-by Assistance using Rolling Walker  6. Gait  x 75 feet with Stand-by Assistance using Rolling Walker   7. Ascend/Descend 6 inch curb step with Contact Guard Assistance using Rolling Walker.  8. Stand for 5 minutes with Stand-by Assistance using Rolling Walker  9. Lower extremity exercise program x15 reps per Instruction, with assistance as needed in order to facilitate improved postural control and improvement in functional independence                       Time Tracking:     PT Received On: 01/08/24  PT Start Time: 0856     PT Stop Time: 0927  PT Total Time (min): 31 min     Billable Minutes: Therapeutic Activity 15 and Therapeutic Exercise 16    Treatment Type: Treatment  PT/PTA: PT     Number of PTA visits since last PT visit: 0     01/08/2024

## 2024-01-08 NOTE — ASSESSMENT & PLAN NOTE
Chronic, controlled. Latest blood pressure and vitals reviewed-     Temp:  [97.3 °F (36.3 °C)-98.7 °F (37.1 °C)]   Pulse:  [64-84]   Resp:  [18-20]   BP: ()/(50-73)   SpO2:  [93 %-98 %] .   Home meds for hypertension were reviewed and noted below.   Hypertension Medications               furosemide (LASIX) 80 MG tablet TAKE 1 TABLET EVERY DAY    metoprolol succinate (TOPROL-XL) 50 MG 24 hr tablet TAKE 1 TABLET EVERY DAY    spironolactone (ALDACTONE) 25 MG tablet TAKE 1/2 TABLET (12.5 MG TOTAL) ONCE DAILY. HOLD IF SYSTOLIC BLOOD PRESSURE IS LESS THAN 110            While in the hospital, will manage blood pressure as follows; Adjust home antihypertensive regimen as follows- Holding in setting of borderline pressures in setting of new diuresis     Will utilize p.r.n. blood pressure medication only if patient's blood pressure greater than 180/110 and he develops symptoms such as worsening chest pain or shortness of breath.

## 2024-01-08 NOTE — ASSESSMENT & PLAN NOTE
Continues to increase. Direct bilirubinemia. No signs of hemolysis.    - Consulting hepatology  - Daily CMPs  - Appears chronic since November. Previously reported as secondary to congestive hepatopathy however remaining LFTs likely WNL.   - US Liver with doppler showing: Bidirectional portal vein flow may be seen with right heart failure, tricuspid regurgitation, or portal hypertension. Evidence of volume overload with distended IVC and hepatic veins.  Pulsatile flow through the hepatic veins (also possibly suggesting tricuspid regurgitation). Suspected hepatic steatosis. No evidence of biliary obstruction.  Possible genetic condition

## 2024-01-08 NOTE — ASSESSMENT & PLAN NOTE
Patient is identified as having Combined Systolic and Diastolic heart failure that is Acute on chronic. CHF is currently uncontrolled due to Pulmonary edema/pleural effusion on CXR. Latest ECHO performed and demonstrates- Results for orders placed during the hospital encounter of 11/10/23    Echo    Interpretation Summary    Left Ventricle: The left ventricle is severely dilated. Mildly increased wall thickness. There is mild concentric hypertrophy. Severe global hypokinesis present. There is severely reduced systolic function with a visually estimated ejection fraction of 10 -15%. Grade III diastolic dysfunction.    Right Ventricle: Severe right ventricular enlargement. Systolic function is severely reduced.    Mitral Valve: There is no stenosis. There is mild to moderate regurgitation with a centrally directed jet.    Tricuspid Valve: There is mild to moderate regurgitation.    Pulmonary Artery: The estimated pulmonary artery systolic pressure is 67 mmHg.  .Last BNP reviewed- and noted below   Recent Labs   Lab 01/07/24  0755   BNP 2,503*         Presented for acute on chronic SOB with associated low UOP. Afebrile HDS. BNP 3,067, Troponin 0.031. CXR with cardiomegaly, vascular congestion, and edema. Received 100 of Lasix in the ED.    - Consulted cardiology for failure to improve despite diuresis, repeat BNP still elevated  - RIP positive for Covid, treatment completed.  - Good response with IV lasix 80 TID, now converted to lasix drip at 15mg/hour   - continue home Jardiance   - Beta blocker/aldactone held given soft pressures, resume when appropriate  - Cardiac diet with Fluid restriction at 1.5L with strict I/Os and daily STANDING weights  - Check Electrolytes, keep Mag >2 & K+ >4  - SCDs, Ambulate as tolerated    - Strict I&Os, Daily standing weights  - Review Low-salt diet and enforce medication adherence on discharge

## 2024-01-08 NOTE — PLAN OF CARE
Problem: Adult Inpatient Plan of Care  Goal: Plan of Care Review  Outcome: Ongoing, Progressing  Goal: Optimal Comfort and Wellbeing  Outcome: Ongoing, Progressing     Problem: Fluid and Electrolyte Imbalance (Acute Kidney Injury/Impairment)  Goal: Fluid and Electrolyte Balance  Outcome: Ongoing, Progressing     Problem: Oral Intake Inadequate (Acute Kidney Injury/Impairment)  Goal: Optimal Nutrition Intake  Outcome: Ongoing, Progressing     Problem: Impaired Wound Healing  Goal: Optimal Wound Healing  Outcome: Ongoing, Progressing     Problem: Adjustment to Illness (Heart Failure)  Goal: Optimal Coping  Outcome: Ongoing, Progressing     Problem: Fluid Imbalance (Heart Failure)  Goal: Fluid Balance  Outcome: Ongoing, Progressing     Problem: Skin Injury Risk Increased  Goal: Skin Health and Integrity  Outcome: Ongoing, Progressing     Problem: Infection  Goal: Absence of Infection Signs and Symptoms  Outcome: Ongoing, Progressing

## 2024-01-08 NOTE — ASSESSMENT & PLAN NOTE
Patient with Hypoxic Respiratory failure which is Acute.  he is not on home oxygen. Supplemental oxygen was provided and noted-      Patient with persistent O2 requirement of 2-3 LPM. Largest contribution likely due to fluid overload in setting of heart failure exacerbation and possible urinary retention. Also found to be COVID positive, s/p course of remdesivir. With his persistent hypoxia, possible that he is developing a post viral pneumonia, or an aspiration pneumonia in setting of dysphagia.   Troponin and repeat EKG WNL, concern for ACS low.   He does have baseline arrhythmia with PM in place, recently interrogated. No AS on recent TTE.    - Continue lasix gtt (see acute on chronic HF)  - CXR showing similar to prior, no significant worsening.   - Low threshold to add antibiotic coverage for PNA  - Wean O2 as tolerated  - BNP repeated and still elevated at 2503.  - Consulting cardiology for failure to improve despite diuresis  - PT/OT consulted, as deconditioning may also be contributing. Nursing communication placed to have patient out of bed and in chair with each meal

## 2024-01-08 NOTE — SUBJECTIVE & OBJECTIVE
Interval History: NAEO. AF. Soft BPs (90/50), otherwise HDS on RA. Pt and his daughter have been bothered by some of the care he has received. In particular, they were upset that his floor has been dirty and sticky for several days and has not been cleaned. He also expressed repeated frustration with getting clinic appointments in a timely manner.     Review of Systems   Respiratory:  Negative for cough and shortness of breath.      Objective:     Vital Signs (Most Recent):  Temp: 97.3 °F (36.3 °C) (01/08/24 0801)  Pulse: 79 (01/08/24 0801)  Resp: 18 (01/08/24 0801)  BP: 107/72 (01/08/24 0801)  SpO2: 98 % (01/08/24 0801) Vital Signs (24h Range):  Temp:  [97.3 °F (36.3 °C)-98.7 °F (37.1 °C)] 97.3 °F (36.3 °C)  Pulse:  [64-79] 79  Resp:  [18-20] 18  SpO2:  [93 %-98 %] 98 %  BP: ()/(50-73) 107/72     Weight: 106.7 kg (235 lb 3.7 oz)  Body mass index is 27.89 kg/m².    Intake/Output Summary (Last 24 hours) at 1/8/2024 0918  Last data filed at 1/7/2024 1554  Gross per 24 hour   Intake 740 ml   Output 1250 ml   Net -510 ml         Physical Exam  Vitals and nursing note reviewed.   Constitutional:       Appearance: He is ill-appearing.   HENT:      Head: Normocephalic and atraumatic.      Mouth/Throat:      Mouth: Mucous membranes are dry.   Eyes:      General: Scleral icterus present.      Extraocular Movements: Extraocular movements intact.      Comments: Anisocoria   Cardiovascular:      Rate and Rhythm: Normal rate and regular rhythm.      Heart sounds:      Gallop present.   Pulmonary:      Effort: Pulmonary effort is normal. No respiratory distress.      Breath sounds: Wheezing (expiratory) present.   Abdominal:      General: Abdomen is flat.      Palpations: Abdomen is soft.   Musculoskeletal:         General: Tenderness (b/l LE tenderness) present.      Right lower leg: Edema present.      Left lower leg: Edema present.   Skin:     General: Skin is warm and dry.      Comments: R leg wound with dressings    Neurological:      Mental Status: He is alert and oriented to person, place, and time.      Motor: Weakness present.   Psychiatric:         Mood and Affect: Mood normal.         Behavior: Behavior normal.             Significant Labs: All pertinent labs within the past 24 hours have been reviewed.    Significant Imaging: I have reviewed all pertinent imaging results/findings within the past 24 hours.

## 2024-01-08 NOTE — PT/OT/SLP PROGRESS
Occupational Therapy   Co-Treatment  Co-treatment performed due to patient's multiple deficits requiring two skilled therapists to appropriately and safely assess patient's strength and endurance while facilitating functional tasks in addition to accommodating for patient's activity tolerance.     Name: Papito Bhakta  MRN: [id number]  Admitting Diagnosis:  Acute hypoxemic respiratory failure  5 Days Post-Op    Recommendations:     Discharge Recommendations: High Intensity Therapy  Discharge Equipment Recommendations:  none  Barriers to discharge:  None    Assessment:     Papito Bhakta is a 68 y.o. male with a medical diagnosis of Acute hypoxemic respiratory failure.  He presents with elevated pain R ankle per pt report and pt willing to participate. Additional time also required to establish therapist-client rapport at beginning of session. He demonstrates unsafe technique with RW management during transfers as evidenced by learning anteriorly and B UEs propped on RW despite verbal cueing provided by therapists. Pt demonstrates good sitting balance EOB with no LOB. During functional transfers with AD, pt requires some additional time for completion and verbal cueing required to ensure upright posture in standing. Good performance and ability to complete ADLs in sitting via setup. Performance deficits affecting function are impaired endurance, impaired self care skills, impaired functional mobility, gait instability, impaired balance, decreased lower extremity function, impaired cardiopulmonary response to activity, decreased safety awareness, decreased ROM.     Rehab Prognosis:  Good; patient would benefit from acute skilled OT services to address these deficits and reach maximum level of function.       Plan:     Patient to be seen 4 x/week to address the above listed problems via self-care/home management, therapeutic activities, therapeutic exercises  Plan of Care Expires: 02/07/24  Plan of Care Reviewed with:  patient, daughter    Subjective     Chief Complaint: Pain R ankle   Patient/Family Comments/goals: Pt and pt's daughter reported concerns of stickiness on floors.   Pain/Comfort:  Pain Rating 1: 10/10  Location - Side 1: Right  Location 1: ankle  Pain Addressed 1: Reposition, Distraction    Objective:     Communicated with: Nurse prior to session.  Patient found supine with telemetry, pulse ox (continuous), friend catheter, peripheral IV, Other (comments) (Kerlex dressing on R ankle) upon OT entry to room. Pt's daughter   A client care conference was completed by the OTR and the DOMINGO prior to treatment by the DOMINGO to discuss the patient's POC and current status.   General Precautions: Standard, airborne, fall, droplet, contact    Orthopedic Precautions:N/A  Braces: N/A  Respiratory Status: Room air     Occupational Performance:     Bed Mobility:    Patient completed Scooting EOB with stand by assistance  Patient completed Supine to Sit with stand by assistance     Functional Mobility/Transfers:  Patient completed Sit <> Stand Transfer from EOB with minimum assistance and of 2 persons  with  rolling walker   Pt grabs RW with UE initiating into standing and requires verbal cueing for hand placement and ensure proper technique  Patient completed Bed <> Chair Transfer using Step Transfer technique with minimum assistance x 2 persons with rolling walker  Verbal cueing provided for RW management and safety of transfers    Activities of Daily Living:  Grooming: pt performed facial hygiene with washcloth and oral hygiene with supervision via setup while seated in bed side chair.  Breakfast tray setup while pt seated after completion of grooming tasks.       [facility] 6 Click ADL: 18    Treatment & Education:  Pt performed bed mobility, functional mobility, transfers and ADLs as documented above.  Patient left up in chair with R LE positioned on wedge for comfort and elevation. All lines intact, call button in reach, RN notified,  and daughter present. Pt appears in NAD.    Pt and pt's daughter educated and instructed on the following;  OT POC  Using call bell for assistance   Importance of sitting up and OOB mobility to improve sitting balance, endurance, and strength  Proper body mechanics and management of RW to ensure safety, proper technique, and direction following  Addressed questions and concerns within DOMINGO scope of practice  Pt and pt's daughter verbalized understanding    GOALS:   Multidisciplinary Problems       Occupational Therapy Goals          Problem: Occupational Therapy    Goal Priority Disciplines Outcome Interventions   Occupational Therapy Goal     OT, PT/OT Ongoing, Progressing    Description: Goals to be met by: 2/7/24 (1 month)      Patient will increase functional independence with ADLs by performing:    UE Dressing with Supervision.  LE Dressing with Supervision.  Grooming while standing at sink with Modified [place].  Toileting from toilet with Modified [place] for hygiene and clothing management.   Rolling to Bilateral with [place].   Supine to sit with [place].  Step transfer with Modified [place]  Toilet transfer to toilet with Modified [place].                       Time Tracking:     OT Date of Treatment: 01/08/24  OT Start Time: 0856  OT Stop Time: 0927  OT Total Time (min): 31 min    Billable Minutes:Self Care/Home Management 15  Therapeutic Activity 16    OT/DIONICIO: DIONICIO     Number of DIONICIO visits since last OT visit: 1 1/8/2024

## 2024-01-08 NOTE — ASSESSMENT & PLAN NOTE
NICM EF 10-15% currently admitted for CHF exacerbation, now net negative 8L since admit. Remains volume overloaded with minimal urine output on lasix ggt 15mg/hr.    Most recent TTE 11/12 with EF 10-15%, G3DD, severely dilated left ventricle, severe right ventricular enlargement with severely reduced systolic function. Cardiology consulted for persistent volume overload, no longer responding to current diuresis.     Recommendations:  - Start  ggt 2.5 mcg/kg/min  - IV lasix 80mg push then increase lasix ggt to 20mg/hr, monitor response.  Goal diuresis 1-2L/day  - Can consider PRN metolazone 5mg if UO < 100cc/hr  - Fluid restriction at 1500 cc with strict I/Os and daily weights   - History of intolerance to GDMT 2/2 hypotension, continue jardiance, once euvolemic will consider re-introduction of low dose GDMT as tolerated.    - Maintain on telemetry and daily EKGs   - Up to date risk stratification : TSH, Lipids, HbA1c with optimization of risk factors is necessary  - Check Electrolytes, keep Mag >2 & K+ >4  - SCDs, TEDs, Nursing communication to elevated LE   - Ambulate as tolerated

## 2024-01-08 NOTE — ASSESSMENT & PLAN NOTE
Creatine stable for now. BMP reviewed- noted Estimated Creatinine Clearance: 63.6 mL/min (based on SCr of 1.4 mg/dL). according to latest data. Based on current GFR, CKD stage is stage 3 - GFR 30-59.  Monitor UOP and serial BMP and adjust therapy as needed. Renally dose meds. Avoid nephrotoxic medications and procedures.

## 2024-01-08 NOTE — ASSESSMENT & PLAN NOTE
Stable on admission, no acute issues, he denies discharge. QTc chronically prolonged. Continue home amiodarone for NSVT prevention. Monitor on telemetry     - 12/31, noted to have AF RVR with hypotension. Started on amio gtt. Per report, pacemaker malfunctioning.  - Restarted on home oral amio  - Device interrogated, no complications. EP has signed off

## 2024-01-08 NOTE — PT/OT/SLP PROGRESS
Speech Language Pathology  Discharge    Papito Bhakta  MRN: [id number]    Patient with oropharyngeal swallow deemed WFL, but complicated by nausea/vomiting episodes post intake. He is able to tolerate liquids. GI consulted and pt scheduled for EGD mutliple times last week, but unable to complete. Continue least restrictive diet recs at this time. No further ST warranted.     1/8/2024

## 2024-01-08 NOTE — SUBJECTIVE & OBJECTIVE
Past Medical History:   Diagnosis Date    RIGOBERTO (acute kidney injury) 10/7/2020    Anticoagulant long-term use     Aspirin    CHF (congestive heart failure)     Hyperlipidemia     Hypertension     Microcytic anemia 12/31/2023    NICM (nonischemic cardiomyopathy) 6/16/2020    Obesity     AMELIA (obstructive sleep apnea) 1/7/2022    Peripheral vascular disease, unspecified 2/1/2021       Past Surgical History:   Procedure Laterality Date    ESOPHAGOGASTRODUODENOSCOPY N/A 1/3/2024    Procedure: EGD (ESOPHAGOGASTRODUODENOSCOPY);  Surgeon: Vaughn Oliver MD;  Location: [facility] ENDO (2ND FLR);  Service: Endoscopy;  Laterality: N/A;    INSERTION OF BIVENTRICULAR IMPLANTABLE CARDIOVERTER-DEFIBRILLATOR (ICD) N/A 02/13/2019    Procedure: INSERTION, ICD, BIVENTRICULAR;  Surgeon: Shailesh Eng MD;  Location: [facility] CATH LAB;  Service: Cardiology;  Laterality: N/A;  RN PRE OP 2-6-19  1ST CASE PER  RADHA. NOTIFIED RADHA THAT ANESTHESIA IS NOT PITO FOR 1ST CASE START-LO    INSERTION OF BIVENTRICULAR IMPLANTABLE CARDIOVERTER-DEFIBRILLATOR (ICD) N/A 09/28/2020    Procedure: INSERTION, ICD, BIVENTRICULAR;  Surgeon: Jim Kwong MD;  Location: [facility] EP LAB;  Service: Cardiology;  Laterality: N/A;  NICM, CRT-D, SJM,, MAC, DM, 3 Prep*Wearing LifeVest*    oral extraction  11/2018    TESTICLE SURGERY         Review of patient's allergies indicates:   Allergen Reactions    Ramipril      Leg swelling and gynecomastia     Losartan Rash       No current facility-administered medications on file prior to encounter.     Current Outpatient Medications on File Prior to Encounter   Medication Sig    amiodarone (PACERONE) 200 MG Tab Take 1 tablet (200 mg total) by mouth once daily.    furosemide (LASIX) 80 MG tablet TAKE 1 TABLET EVERY DAY    metoprolol succinate (TOPROL-XL) 50 MG 24 hr tablet TAKE 1 TABLET EVERY DAY    potassium chloride (K-TAB) 20 mEq TAKE 1 TABLET EVERY DAY    pravastatin (PRAVACHOL) 80 MG tablet TAKE 1 TABLET EVERY DAY     spironolactone (ALDACTONE) 25 MG tablet TAKE 1/2 TABLET (12.5 MG TOTAL) ONCE DAILY. HOLD IF SYSTOLIC BLOOD PRESSURE IS LESS THAN 110    aspirin (ECOTRIN) 325 MG EC tablet Take 1 tablet (325 mg total) by mouth once daily.    empagliflozin (JARDIANCE) 10 mg tablet Take 1 tablet (10 mg total) by mouth once daily.    [DISCONTINUED] ramipril (ALTACE) 10 MG capsule Take 1 capsule (10 mg total) by mouth once daily.     Family History       Problem Relation (Age of Onset)    Epilepsy Mother          Tobacco Use    Smoking status: Former     Current packs/day: 0.00     Average packs/day: 0.5 packs/day for 40.0 years (20.0 ttl pk-yrs)     Types: Cigarettes, Cigars     Start date: 1974     Quit date: 2014     Years since quitting: 10.0     Passive exposure: Current    Smokeless tobacco: Never   Substance and Sexual Activity    Alcohol use: Yes     Comment: once a month beer or liquor    Drug use: No    Sexual activity: Not Currently     Birth control/protection: None     Comment: uses protection sometimes     Review of Systems   Cardiovascular:  Positive for leg swelling and orthopnea. Negative for chest pain, near-syncope and palpitations.   Respiratory:  Positive for shortness of breath.      Objective:     Vital Signs (Most Recent):  Temp: 97.2 °F (36.2 °C) (01/08/24 1221)  Pulse: 73 (01/08/24 1619)  Resp: 20 (01/08/24 1221)  BP: 91/62 (01/08/24 1221)  SpO2: 97 % (01/08/24 1221) Vital Signs (24h Range):  Temp:  [97.2 °F (36.2 °C)-97.9 °F (36.6 °C)] 97.2 °F (36.2 °C)  Pulse:  [67-84] 73  Resp:  [18-20] 20  SpO2:  [97 %-98 %] 97 %  BP: ()/(50-72) 91/62     Weight: 106.7 kg (235 lb 3.7 oz)  Body mass index is 27.89 kg/m².    SpO2: 97 %         Intake/Output Summary (Last 24 hours) at 1/8/2024 1636  Last data filed at 1/8/2024 1421  Gross per 24 hour   Intake --   Output 650 ml   Net -650 ml       Lines/Drains/Airways       Drain  Duration                  Urethral Catheter 01/06/24 1300 Non-latex 16 Fr. 2 days               Peripheral Intravenous Line  Duration                  Peripheral IV - Single Lumen 01/05/24 1030 22 G Posterior;Right Hand 3 days         Peripheral IV - Single Lumen 01/05/24 1600 20 G Anterior;Left Upper Arm 3 days                     Physical Exam  Constitutional:       General: He is not in acute distress.     Appearance: He is ill-appearing.   Neck:      Comments: JVD present below ear  Cardiovascular:      Rate and Rhythm: Normal rate and regular rhythm.      Pulses: Normal pulses.      Heart sounds: No murmur heard.  Pulmonary:      Effort: Pulmonary effort is normal.      Breath sounds: Rhonchi present. No wheezing.   Musculoskeletal:      Right lower leg: Edema present.      Left lower leg: Edema present.      Comments: +3 pitting edema to thigh bilaterally   Skin:     General: Skin is warm.      Capillary Refill: Capillary refill takes less than 2 seconds.   Neurological:      General: No focal deficit present.      Mental Status: He is alert.          Significant Labs: All pertinent lab results from the last 24 hours have been reviewed.    Significant Imaging:  Reviewed

## 2024-01-08 NOTE — PROGRESS NOTES
[facility] - Cardiology [facility] [place] Medicine  Progress Note    Patient Name: Papito Bhakta  MRN: [id number]  Patient Class: IP- Inpatient   Admission Date: 12/29/2023  Length of Stay: 6 days  Attending Physician: Shell Medrano*  Primary Care Provider: Brynn To MD        Subjective:     Principal Problem:Acute hypoxemic respiratory failure        HPI:  Papito Bhakta is a 68 y.o. male with NICM, combined CHF(11/2023 EF 10 -15%, G3DD) s/p ICD placement, CKD, HLD, HTN, and AMELIA who presents for bilateral lower extremity swelling and shortness of breath. Patient reports he has been having worsening shortness of breath for the past 6 months. He had acutely worsening SOB today where he described becoming profoundly dyspneic on exertion. He reported taking 2 of his 80 mg Lasix this morning with subsequent minimal urine output and minimal improvement in his SOB. He reports SOB aggravated with lying down and he requires frequent positional changes to keep comfortable. He reports additional poor appetite and urine output for the past week although he reports he has been drinking well. He had 2 episodes of nausea/vomiting this past week as well. He denies fever/chills, chest pain, abdominal pain, recent illness, medication changes. Patient reports adherence with all medications. He reports he lives with his daughter who helps him with his medications and healthcare.    Additionally he reports chronic leg pain from a chronic RLE wound. He went to wound care yesterday where dressings were changed and he was told they were healing well. He has bilateral lower extremity edema R>L that is typical for him and he denies recent worsening.    Overview/Hospital Course:  Pt admitted to hospital medicine for acute heart failure exacerbation and inability to swallow solids for 3 months duration. Initiated on IV lasix. SLP evaluated the patient and determined that he could tolerate liquids. He had a new leukocytosis  noted on 12/31, work up significant for COVID positive. He completed remdesivir, steroids held in order to prevent worsening fluid retention, and was able to be weaned to room oxygen by 1/4. Overnight 12/31, patient becane tachycardic and hypotensive (asymptomatic). Cardiology called and concluded that he was in atrial fibrillation with RVR and he was briefly changed from oral amiodarone to IV amiodarone for one day before resuming his oral dosing. PM interrogated, noted to be functioning accurately. Cardiology signed off. IV lasix increased from pushes to gtt. Urine output did not significantly increase, bladder scan with 325ml urine. Mcmahon placed.     GI was consulted for his ongoing dysphagia. With his ongoing aggressive diuresis, COVID infection, and episode of hemodynamic stability, the EGD was deferred until he becomes more euvolemic.     Transient episode of increased respiratory distress 1/6. EKG without significant changes from prior. Troponin negative. CXR showed stability from prior imaging.    During his admission, noted to have chronic bilirubinemia, assumed likely secondary to congestive hepatopathy. RUQ US without biliary obstruction. Hemolysis labs negative. Tbili continued to uptrend despite improvement in fluid status and renal function. Also noted to have worsening thrombocytopenia, despite normal LFTs. He has chronic lower extremity leg wounds secondary to peripheral vascular disease. Wound care was consulted and assisted in managing the wounds during his admission. He has outpatient follow-up scheduled with vascular surgery at the end of February. Consulting cardiology and hepatology today.    Interval History: NAEO. AF. Soft BPs (90/50), otherwise HDS on RA. Pt and his daughter have been bothered by some of the care he has received. In particular, they were upset that his floor has been dirty and sticky for several days and has not been cleaned. He also expressed repeated frustration with  getting clinic appointments in a timely manner.     Review of Systems   Respiratory:  Negative for cough and shortness of breath.      Objective:     Vital Signs (Most Recent):  Temp: 97.3 °F (36.3 °C) (01/08/24 0801)  Pulse: 79 (01/08/24 0801)  Resp: 18 (01/08/24 0801)  BP: 107/72 (01/08/24 0801)  SpO2: 98 % (01/08/24 0801) Vital Signs (24h Range):  Temp:  [97.3 °F (36.3 °C)-98.7 °F (37.1 °C)] 97.3 °F (36.3 °C)  Pulse:  [64-79] 79  Resp:  [18-20] 18  SpO2:  [93 %-98 %] 98 %  BP: ()/(50-73) 107/72     Weight: 106.7 kg (235 lb 3.7 oz)  Body mass index is 27.89 kg/m².    Intake/Output Summary (Last 24 hours) at 1/8/2024 0918  Last data filed at 1/7/2024 1554  Gross per 24 hour   Intake 740 ml   Output 1250 ml   Net -510 ml         Physical Exam  Vitals and nursing note reviewed.   Constitutional:       Appearance: He is ill-appearing.   HENT:      Head: Normocephalic and atraumatic.      Mouth/Throat:      Mouth: Mucous membranes are dry.   Eyes:      General: Scleral icterus present.      Extraocular Movements: Extraocular movements intact.      Comments: Anisocoria   Cardiovascular:      Rate and Rhythm: Normal rate and regular rhythm.      Heart sounds:      Gallop present.   Pulmonary:      Effort: Pulmonary effort is normal. No respiratory distress.      Breath sounds: Wheezing (expiratory) present.   Abdominal:      General: Abdomen is flat.      Palpations: Abdomen is soft.   Musculoskeletal:         General: Tenderness (b/l LE tenderness) present.      Right lower leg: Edema present.      Left lower leg: Edema present.   Skin:     General: Skin is warm and dry.      Comments: R leg wound with dressings   Neurological:      Mental Status: He is alert and oriented to person, place, and time.      Motor: Weakness present.   Psychiatric:         Mood and Affect: Mood normal.         Behavior: Behavior normal.             Significant Labs: All pertinent labs within the past 24 hours have been  reviewed.    Significant Imaging: I have reviewed all pertinent imaging results/findings within the past 24 hours.    Assessment/Plan:      * Acute hypoxemic respiratory failure  Patient with Hypoxic Respiratory failure which is Acute.  he is not on home oxygen. Supplemental oxygen was provided and noted-      Patient with persistent O2 requirement of 2-3 LPM. Largest contribution likely due to fluid overload in setting of heart failure exacerbation and possible urinary retention. Also found to be COVID positive, s/p course of remdesivir. With his persistent hypoxia, possible that he is developing a post viral pneumonia, or an aspiration pneumonia in setting of dysphagia.   Troponin and repeat EKG WNL, concern for ACS low.   He does have baseline arrhythmia with PM in place, recently interrogated. No AS on recent TTE.    - Continue lasix gtt (see acute on chronic HF)  - CXR showing similar to prior, no significant worsening.   - Low threshold to add antibiotic coverage for PNA  - Wean O2 as tolerated  - BNP repeated and still elevated at 2503.  - Consulting cardiology for failure to improve despite diuresis  - PT/OT consulted, as deconditioning may also be contributing. Nursing communication placed to have patient out of bed and in chair with each meal      CKD (chronic kidney disease)  Creatine stable for now. BMP reviewed- noted Estimated Creatinine Clearance: 63.6 mL/min (based on SCr of 1.4 mg/dL). according to latest data. Based on current GFR, CKD stage is stage 3 - GFR 30-59.  Monitor UOP and serial BMP and adjust therapy as needed. Renally dose meds. Avoid nephrotoxic medications and procedures.    COVID-19  COVID positive, noted 12/31    - Finished remdesivir course 01/02/24  - Hold steroids at this time, hesitant to exacerbate fluid retention     Microcytic anemia  Iron studies notable for Fe 26 and sat iron 8%. TIBC, ferritin, transferrin wnl. Likely iron deficiency anemia.    - Daily CBCs  - Feraheme  given    Dysphagia  Reports a 3 month history of inability to tolerate solid foods. Notable vomiting immediatly with swallowing food/liquid. No complaints of nausea. SLP has assessed him, can tolerate liquids.     - Continue liquid diet, Boost TID  - GI cancelled EGD and signing off. Will re-consult once patient is more euvolemic and hemodynamically stable.       Pupil asymmetry  Notable asymetry on pupils by patient. Chart review shows left orbital trauma in 2021 with notes concerned for dilation and vision changes. When talking to daughter, this is chronic. States no vision changes or neuro symptoms whatsoever.     Serum total bilirubin elevated  Continues to increase. Direct bilirubinemia. No signs of hemolysis.    - Consulting hepatology  - Daily CMPs  - Appears chronic since November. Previously reported as secondary to congestive hepatopathy however remaining LFTs likely WNL.   - US Liver with doppler showing: Bidirectional portal vein flow may be seen with right heart failure, tricuspid regurgitation, or portal hypertension. Evidence of volume overload with distended IVC and hepatic veins.  Pulsatile flow through the hepatic veins (also possibly suggesting tricuspid regurgitation). Suspected hepatic steatosis. No evidence of biliary obstruction.  Possible genetic condition      Acute on chronic combined systolic and diastolic CHF (congestive heart failure)  Patient is identified as having Combined Systolic and Diastolic heart failure that is Acute on chronic. CHF is currently uncontrolled due to Pulmonary edema/pleural effusion on CXR. Latest ECHO performed and demonstrates- Results for orders placed during the hospital encounter of 11/10/23    Echo    Interpretation Summary    Left Ventricle: The left ventricle is severely dilated. Mildly increased wall thickness. There is mild concentric hypertrophy. Severe global hypokinesis present. There is severely reduced systolic function with a visually estimated  ejection fraction of 10 -15%. Grade III diastolic dysfunction.    Right Ventricle: Severe right ventricular enlargement. Systolic function is severely reduced.    Mitral Valve: There is no stenosis. There is mild to moderate regurgitation with a centrally directed jet.    Tricuspid Valve: There is mild to moderate regurgitation.    Pulmonary Artery: The estimated pulmonary artery systolic pressure is 67 mmHg.  .Last BNP reviewed- and noted below   Recent Labs   Lab 01/07/24  0755   BNP 2,503*         Presented for acute on chronic SOB with associated low UOP. Afebrile HDS. BNP 3,067, Troponin 0.031. CXR with cardiomegaly, vascular congestion, and edema. Received 100 of Lasix in the ED.    - Consulted cardiology for failure to improve despite diuresis, repeat BNP still elevated  - RIP positive for Covid, treatment completed.  - Good response with IV lasix 80 TID, now converted to lasix drip at 15mg/hour   - continue home Jardiance   - Beta blocker/aldactone held given soft pressures, resume when appropriate  - Cardiac diet with Fluid restriction at 1.5L with strict I/Os and daily STANDING weights  - Check Electrolytes, keep Mag >2 & K+ >4  - SCDs, Ambulate as tolerated    - Strict I&Os, Daily standing weights  - Review Low-salt diet and enforce medication adherence on discharge    Acute renal failure superimposed on stage 3a chronic kidney disease  Creatinine 2.1 on admit, baseline around 1.4. Likely prerenal etiology given urine sodium and FENa vs progression of CKD. Improving with increased diuresis     Recent Labs     01/06/24  1627 01/07/24  0755 01/08/24  0430   BUN 60* 56* 51*   CREATININE 1.6* 1.5* 1.4         Estimated Creatinine Clearance: 63.6 mL/min (based on SCr of 1.4 mg/dL).  Improving    - Renal US: renal cysts, no hydronephrosis  - Urine Na: 14, Pr/Cr: 0.72; FENa 0.2%  - Monitor urine output and serial BMP and adjust therapy as needed.   - Strict I&Os and daily weights   - Avoid nephrotoxic agents  such as NSAIDs, gadolinium and IV radiocontrast.  - Renally dose meds to current GFR.  - Maintain MAP > 65.  - Nephrology referral outpatient    AMELIA (obstructive sleep apnea)  Carried diagnosis of AMELIA per chart, however he does not sleep with CPAP at home and declines while here      Peripheral vascular disease, unspecified  Patient with chronic lower leg wounds that recently established with wound care. He is set to follow up with vascular surgery outpatient at the end of February.    Appreciate wound care recommendations  Continue diuresis       NICM (nonischemic cardiomyopathy)  ASA 325mg qd per home medication list. Reason for this dose unclear.      Biventricular ICD (implantable cardioverter-defibrillator) in place  Stable on admission, no acute issues, he denies discharge. QTc chronically prolonged. Continue home amiodarone for NSVT prevention. Monitor on telemetry     - 12/31, noted to have AF RVR with hypotension. Started on amio gtt. Per report, pacemaker malfunctioning.  - Restarted on home oral amio  - Device interrogated, no complications. EP has signed off    Hyperlipidemia  Stable. Continue Home Pravastatin.        Essential hypertension  Chronic, controlled. Latest blood pressure and vitals reviewed-     Temp:  [97.3 °F (36.3 °C)-98.7 °F (37.1 °C)]   Pulse:  [64-84]   Resp:  [18-20]   BP: ()/(50-73)   SpO2:  [93 %-98 %] .   Home meds for hypertension were reviewed and noted below.   Hypertension Medications               furosemide (LASIX) 80 MG tablet TAKE 1 TABLET EVERY DAY    metoprolol succinate (TOPROL-XL) 50 MG 24 hr tablet TAKE 1 TABLET EVERY DAY    spironolactone (ALDACTONE) 25 MG tablet TAKE 1/2 TABLET (12.5 MG TOTAL) ONCE DAILY. HOLD IF SYSTOLIC BLOOD PRESSURE IS LESS THAN 110            While in the hospital, will manage blood pressure as follows; Adjust home antihypertensive regimen as follows- Holding in setting of borderline pressures in setting of new diuresis     Will utilize  p.r.n. blood pressure medication only if patient's blood pressure greater than 180/110 and he develops symptoms such as worsening chest pain or shortness of breath.      VTE Risk Mitigation (From admission, onward)           Ordered     heparin (porcine) injection 5,000 Units  Every 8 hours         12/29/23 1759     IP VTE HIGH RISK PATIENT  Once         12/29/23 1759     Place sequential compression device  Until discontinued         12/29/23 1759                    Discharge Planning   MARIIA: 1/12/2024     Code Status: Full Code   Is the patient medically ready for discharge?: No    Reason for patient still in hospital (select all that apply): Treatment and Consult recommendations  Discharge Plan A: Home with family                  Deepa Holly MD  Department of Hospital Medicine   [facility] - Cardiology Stepdown

## 2024-01-08 NOTE — HPI
68 year old male with NICM EF 10-15% s/p ICD, HTN, HLD, and AMELIA. He is admitted for CHF exacerbation and RIGOBERTO initially treated with Lasix ggt 15mg/hr, currently net negative 8L since admit. Course has been complicated by asymptomatic COVID 19 and new onset Afib in which EP evaluated. Patient now on amiodarone but remains severely overloaded with decreased response to current diuretic regimen.

## 2024-01-08 NOTE — ASSESSMENT & PLAN NOTE
Creatinine 2.1 on admit, baseline around 1.4. Likely prerenal etiology given urine sodium and FENa vs progression of CKD. Improving with increased diuresis     Recent Labs     01/06/24  1627 01/07/24  0755 01/08/24  0430   BUN 60* 56* 51*   CREATININE 1.6* 1.5* 1.4         Estimated Creatinine Clearance: 63.6 mL/min (based on SCr of 1.4 mg/dL).  Improving    - Renal US: renal cysts, no hydronephrosis  - Urine Na: 14, Pr/Cr: 0.72; FENa 0.2%  - Monitor urine output and serial BMP and adjust therapy as needed.   - Strict I&Os and daily weights   - Avoid nephrotoxic agents such as NSAIDs, gadolinium and IV radiocontrast.  - Renally dose meds to current GFR.  - Maintain MAP > 65.  - Nephrology referral outpatient

## 2024-01-08 NOTE — ASSESSMENT & PLAN NOTE
Iron studies notable for Fe 26 and sat iron 8%. TIBC, ferritin, transferrin wnl. Likely iron deficiency anemia.    - Daily CBCs  - Feraheme given

## 2024-01-08 NOTE — ASSESSMENT & PLAN NOTE
COVID positive, noted 12/31    - Finished remdesivir course 01/02/24  - Hold steroids at this time, hesitant to exacerbate fluid retention

## 2024-01-08 NOTE — CONSULTS
[facility] Step[facility]  Cardiology  Consult Note    Patient Name: Papito Bhakta  MRN: [id number]  Admission Date: 12/29/2023  Hospital Length of Stay: 6 days  Code Status: Full Code   Attending Provider: Shell Medrano*   Consulting Provider: Montserrat Morton MD  Primary Care Physician: Brynn To MD  Principal Problem:Acute hypoxemic respiratory failure    Patient information was obtained from patient and ER records.     Inpatient consult to Cardiology  Consult performed by: Montserrat Morton MD  Consult ordered by: Deepa Holly MD        Subjective:     Chief Complaint:  SOB     HPI:   68 year old male with NICM EF 10-15% s/p ICD, HTN, HLD, and AMELIA. He is admitted for CHF exacerbation and RIGOBERTO initially treated with Lasix ggt 15mg/hr, currently net negative 8L since admit. Course has been complicated by asymptomatic COVID 19 and new onset Afib in which EP evaluated. Patient now on amiodarone but remains severely overloaded with decreased response to current diuretic regimen.     Past Medical History:   Diagnosis Date    RIGOBERTO (acute kidney injury) 10/7/2020    Anticoagulant long-term use     Aspirin    CHF (congestive heart failure)     Hyperlipidemia     Hypertension     Microcytic anemia 12/31/2023    NICM (nonischemic cardiomyopathy) 6/16/2020    Obesity     AMELIA (obstructive sleep apnea) 1/7/2022    Peripheral vascular disease, unspecified 2/1/2021       Past Surgical History:   Procedure Laterality Date    ESOPHAGOGASTRODUODENOSCOPY N/A 1/3/2024    Procedure: EGD (ESOPHAGOGASTRODUODENOSCOPY);  Surgeon: Vaughn Oliver MD;  Location: [address]);  Service: Endoscopy;  Laterality: N/A;    INSERTION OF BIVENTRICULAR IMPLANTABLE CARDIOVERTER-DEFIBRILLATOR (ICD) N/A 02/13/2019    Procedure: INSERTION, ICD, BIVENTRICULAR;  Surgeon: Shailesh Eng MD;  Location: [facility] CATH LAB;  Service: Cardiology;  Laterality: N/A;  RN PRE OP 2-6-19  1ST CASE PER  RADHA. NOTIFIED RADHA THAT ANESTHESIA IS NOT  PITO FOR 1ST CASE START-LO    INSERTION OF BIVENTRICULAR IMPLANTABLE CARDIOVERTER-DEFIBRILLATOR (ICD) N/A 09/28/2020    Procedure: INSERTION, ICD, BIVENTRICULAR;  Surgeon: Jim Kwong MD;  Location: [facility] EP LAB;  Service: Cardiology;  Laterality: N/A;  NICM, CRT-D, SJM,, MAC, DM, 3 Prep*Wearing LifeVest*    oral extraction  11/2018    TESTICLE SURGERY         Review of patient's allergies indicates:   Allergen Reactions    Ramipril      Leg swelling and gynecomastia     Losartan Rash       No current facility-administered medications on file prior to encounter.     Current Outpatient Medications on File Prior to Encounter   Medication Sig    amiodarone (PACERONE) 200 MG Tab Take 1 tablet (200 mg total) by mouth once daily.    furosemide (LASIX) 80 MG tablet TAKE 1 TABLET EVERY DAY    metoprolol succinate (TOPROL-XL) 50 MG 24 hr tablet TAKE 1 TABLET EVERY DAY    potassium chloride (K-TAB) 20 mEq TAKE 1 TABLET EVERY DAY    pravastatin (PRAVACHOL) 80 MG tablet TAKE 1 TABLET EVERY DAY    spironolactone (ALDACTONE) 25 MG tablet TAKE 1/2 TABLET (12.5 MG TOTAL) ONCE DAILY. HOLD IF SYSTOLIC BLOOD PRESSURE IS LESS THAN 110    aspirin (ECOTRIN) 325 MG EC tablet Take 1 tablet (325 mg total) by mouth once daily.    empagliflozin (JARDIANCE) 10 mg tablet Take 1 tablet (10 mg total) by mouth once daily.    [DISCONTINUED] ramipril (ALTACE) 10 MG capsule Take 1 capsule (10 mg total) by mouth once daily.     Family History       Problem Relation (Age of Onset)    Epilepsy Mother          Tobacco Use    Smoking status: Former     Current packs/day: 0.00     Average packs/day: 0.5 packs/day for 40.0 years (20.0 ttl pk-yrs)     Types: Cigarettes, Cigars     Start date: 1974     Quit date: 2014     Years since quitting: 10.0     Passive exposure: Current    Smokeless tobacco: Never   Substance and Sexual Activity    Alcohol use: Yes     Comment: once a month beer or liquor    Drug use: No    Sexual activity: Not Currently     Birth  control/protection: None     Comment: uses protection sometimes     Review of Systems   Cardiovascular:  Positive for leg swelling and orthopnea. Negative for chest pain, near-syncope and palpitations.   Respiratory:  Positive for shortness of breath.      Objective:     Vital Signs (Most Recent):  Temp: 97.2 °F (36.2 °C) (01/08/24 1221)  Pulse: 73 (01/08/24 1619)  Resp: 20 (01/08/24 1221)  BP: 91/62 (01/08/24 1221)  SpO2: 97 % (01/08/24 1221) Vital Signs (24h Range):  Temp:  [97.2 °F (36.2 °C)-97.9 °F (36.6 °C)] 97.2 °F (36.2 °C)  Pulse:  [67-84] 73  Resp:  [18-20] 20  SpO2:  [97 %-98 %] 97 %  BP: ()/(50-72) 91/62     Weight: 106.7 kg (235 lb 3.7 oz)  Body mass index is 27.89 kg/m².    SpO2: 97 %         Intake/Output Summary (Last 24 hours) at 1/8/2024 1636  Last data filed at 1/8/2024 1421  Gross per 24 hour   Intake --   Output 650 ml   Net -650 ml       Lines/Drains/Airways       Drain  Duration                  Urethral Catheter 01/06/24 1300 Non-latex 16 Fr. 2 days              Peripheral Intravenous Line  Duration                  Peripheral IV - Single Lumen 01/05/24 1030 22 G Posterior;Right Hand 3 days         Peripheral IV - Single Lumen 01/05/24 1600 20 G Anterior;Left Upper Arm 3 days                     Physical Exam  Constitutional:       General: He is not in acute distress.     Appearance: He is ill-appearing.   Neck:      Comments: JVD present below ear  Cardiovascular:      Rate and Rhythm: Normal rate and regular rhythm.      Pulses: Normal pulses.      Heart sounds: No murmur heard.  Pulmonary:      Effort: Pulmonary effort is normal.      Breath sounds: Rhonchi present. No wheezing.   Musculoskeletal:      Right lower leg: Edema present.      Left lower leg: Edema present.      Comments: +3 pitting edema to thigh bilaterally   Skin:     General: Skin is warm.      Capillary Refill: Capillary refill takes less than 2 seconds.   Neurological:      General: No focal deficit present.       Mental Status: He is alert.          Significant Labs: All pertinent lab results from the last 24 hours have been reviewed.    Significant Imaging:  Reviewed  Assessment and Plan:     NICM (nonischemic cardiomyopathy)  NICM EF 10-15% currently admitted for CHF exacerbation, now net negative 8L since admit. Remains volume overloaded with minimal urine output on lasix ggt 15mg/hr.    Most recent TTE 11/12 with EF 10-15%, G3DD, severely dilated left ventricle, severe right ventricular enlargement with severely reduced systolic function. Cardiology consulted for persistent volume overload, no longer responding to current diuresis.     Recommendations:  - Start  ggt 2.5 mcg/kg/min  - IV lasix 80mg push then increase lasix ggt to 20mg/hr, monitor response.  Goal diuresis 1-2L/day  - Can consider PRN metolazone 5mg if UO < 100cc/hr  - Fluid restriction at 1500 cc with strict I/Os and daily weights   - History of intolerance to GDMT 2/2 hypotension, continue jardiance, once euvolemic will consider re-introduction of low dose GDMT as tolerated.    - Maintain on telemetry and daily EKGs   - Up to date risk stratification : TSH, Lipids, HbA1c with optimization of risk factors is necessary  - Check Electrolytes, keep Mag >2 & K+ >4  - SCDs, TEDs, Nursing communication to elevated LE   - Ambulate as tolerated          VTE Risk Mitigation (From admission, onward)           Ordered     heparin (porcine) injection 5,000 Units  Every 8 hours         12/29/23 1759     IP VTE HIGH RISK PATIENT  Once         12/29/23 1759     Place sequential compression device  Until discontinued         12/29/23 1759                    Thank you for your consult. I will follow-up with patient. Please contact us if you have any additional questions.    Montserrat Morton MD  Cardiology   [facility] Step[facility]

## 2024-01-09 LAB
ALBUMIN SERPL BCP-MCNC: 2.1 G/DL (ref 3.5–5.2)
ALP SERPL-CCNC: 97 U/L (ref 55–135)
ALT SERPL W/O P-5'-P-CCNC: 30 U/L (ref 10–44)
ANION GAP SERPL CALC-SCNC: 11 MMOL/L (ref 8–16)
ANION GAP SERPL CALC-SCNC: 11 MMOL/L (ref 8–16)
AST SERPL-CCNC: 32 U/L (ref 10–40)
BACTERIA UR CULT: ABNORMAL
BILIRUB SERPL-MCNC: 9.8 MG/DL (ref 0.1–1)
BUN SERPL-MCNC: 46 MG/DL (ref 8–23)
BUN SERPL-MCNC: 49 MG/DL (ref 8–23)
CALCIUM SERPL-MCNC: 8.7 MG/DL (ref 8.7–10.5)
CALCIUM SERPL-MCNC: 8.8 MG/DL (ref 8.7–10.5)
CHLORIDE SERPL-SCNC: 96 MMOL/L (ref 95–110)
CHLORIDE SERPL-SCNC: 97 MMOL/L (ref 95–110)
CO2 SERPL-SCNC: 28 MMOL/L (ref 23–29)
CO2 SERPL-SCNC: 29 MMOL/L (ref 23–29)
CREAT SERPL-MCNC: 1.3 MG/DL (ref 0.5–1.4)
CREAT SERPL-MCNC: 1.4 MG/DL (ref 0.5–1.4)
ERYTHROCYTE [DISTWIDTH] IN BLOOD BY AUTOMATED COUNT: 22.3 % (ref 11.5–14.5)
EST. GFR  (NO RACE VARIABLE): 54.7 ML/MIN/1.73 M^2
EST. GFR  (NO RACE VARIABLE): 59.8 ML/MIN/1.73 M^2
GLUCOSE SERPL-MCNC: 105 MG/DL (ref 70–110)
GLUCOSE SERPL-MCNC: 94 MG/DL (ref 70–110)
HCT VFR BLD AUTO: 36.9 % (ref 40–54)
HGB BLD-MCNC: 12.7 G/DL (ref 14–18)
IGG SERPL-MCNC: 1777 MG/DL (ref 650–1600)
MAGNESIUM SERPL-MCNC: 2.2 MG/DL (ref 1.6–2.6)
MCH RBC QN AUTO: 23.7 PG (ref 27–31)
MCHC RBC AUTO-ENTMCNC: 34.4 G/DL (ref 32–36)
MCV RBC AUTO: 69 FL (ref 82–98)
PHOSPHATE SERPL-MCNC: 3.1 MG/DL (ref 2.7–4.5)
PLATELET # BLD AUTO: 89 K/UL (ref 150–450)
PMV BLD AUTO: ABNORMAL FL (ref 9.2–12.9)
POTASSIUM SERPL-SCNC: 3.2 MMOL/L (ref 3.5–5.1)
POTASSIUM SERPL-SCNC: 3.8 MMOL/L (ref 3.5–5.1)
PROT SERPL-MCNC: 5.9 G/DL (ref 6–8.4)
RBC # BLD AUTO: 5.36 M/UL (ref 4.6–6.2)
SODIUM SERPL-SCNC: 135 MMOL/L (ref 136–145)
SODIUM SERPL-SCNC: 137 MMOL/L (ref 136–145)
WBC # BLD AUTO: 8.08 K/UL (ref 3.9–12.7)

## 2024-01-09 PROCEDURE — 63600175 PHARM REV CODE 636 W HCPCS

## 2024-01-09 PROCEDURE — 25000003 PHARM REV CODE 250: Performed by: STUDENT IN AN ORGANIZED HEALTH CARE EDUCATION/TRAINING PROGRAM

## 2024-01-09 PROCEDURE — 86038 ANTINUCLEAR ANTIBODIES: CPT

## 2024-01-09 PROCEDURE — 86015 ACTIN ANTIBODY EACH: CPT

## 2024-01-09 PROCEDURE — 80053 COMPREHEN METABOLIC PANEL: CPT | Performed by: STUDENT IN AN ORGANIZED HEALTH CARE EDUCATION/TRAINING PROGRAM

## 2024-01-09 PROCEDURE — 80048 BASIC METABOLIC PNL TOTAL CA: CPT | Mod: XB

## 2024-01-09 PROCEDURE — 27000207 HC ISOLATION

## 2024-01-09 PROCEDURE — 84100 ASSAY OF PHOSPHORUS: CPT | Performed by: STUDENT IN AN ORGANIZED HEALTH CARE EDUCATION/TRAINING PROGRAM

## 2024-01-09 PROCEDURE — 25000003 PHARM REV CODE 250

## 2024-01-09 PROCEDURE — 83735 ASSAY OF MAGNESIUM: CPT | Performed by: STUDENT IN AN ORGANIZED HEALTH CARE EDUCATION/TRAINING PROGRAM

## 2024-01-09 PROCEDURE — 97110 THERAPEUTIC EXERCISES: CPT | Mod: CO

## 2024-01-09 PROCEDURE — 97116 GAIT TRAINING THERAPY: CPT

## 2024-01-09 PROCEDURE — 85027 COMPLETE CBC AUTOMATED: CPT | Performed by: STUDENT IN AN ORGANIZED HEALTH CARE EDUCATION/TRAINING PROGRAM

## 2024-01-09 PROCEDURE — 36415 COLL VENOUS BLD VENIPUNCTURE: CPT | Performed by: STUDENT IN AN ORGANIZED HEALTH CARE EDUCATION/TRAINING PROGRAM

## 2024-01-09 PROCEDURE — 86381 MITOCHONDRIAL ANTIBODY EACH: CPT

## 2024-01-09 PROCEDURE — 99223 1ST HOSP IP/OBS HIGH 75: CPT | Mod: GC,,, | Performed by: STUDENT IN AN ORGANIZED HEALTH CARE EDUCATION/TRAINING PROGRAM

## 2024-01-09 PROCEDURE — 86376 MICROSOMAL ANTIBODY EACH: CPT

## 2024-01-09 PROCEDURE — 82784 ASSAY IGA/IGD/IGG/IGM EACH: CPT

## 2024-01-09 PROCEDURE — 36415 COLL VENOUS BLD VENIPUNCTURE: CPT | Mod: XB

## 2024-01-09 PROCEDURE — 20600001 HC STEP DOWN PRIVATE ROOM

## 2024-01-09 PROCEDURE — 25000242 PHARM REV CODE 250 ALT 637 W/ HCPCS

## 2024-01-09 PROCEDURE — 97530 THERAPEUTIC ACTIVITIES: CPT | Mod: CO

## 2024-01-09 PROCEDURE — 97530 THERAPEUTIC ACTIVITIES: CPT

## 2024-01-09 RX ORDER — DEXAMETHASONE 0.5 MG/5ML
1 SOLUTION ORAL 4 TIMES DAILY
Status: DISCONTINUED | OUTPATIENT
Start: 2024-01-09 | End: 2024-01-10

## 2024-01-09 RX ADMIN — EMPAGLIFLOZIN 10 MG: 10 TABLET, FILM COATED ORAL at 08:01

## 2024-01-09 RX ADMIN — POTASSIUM BICARBONATE 40 MEQ: 391 TABLET, EFFERVESCENT ORAL at 02:01

## 2024-01-09 RX ADMIN — SENNOSIDES AND DOCUSATE SODIUM 1 TABLET: 8.6; 5 TABLET ORAL at 08:01

## 2024-01-09 RX ADMIN — ASPIRIN 325 MG: 325 TABLET, COATED ORAL at 08:01

## 2024-01-09 RX ADMIN — PRAVASTATIN SODIUM 80 MG: 40 TABLET ORAL at 08:01

## 2024-01-09 RX ADMIN — MUPIROCIN: 20 OINTMENT TOPICAL at 09:01

## 2024-01-09 RX ADMIN — POTASSIUM BICARBONATE 40 MEQ: 391 TABLET, EFFERVESCENT ORAL at 10:01

## 2024-01-09 RX ADMIN — DEXAMETHASONE 1 MG: 0.5 SOLUTION ORAL at 10:01

## 2024-01-09 RX ADMIN — DEXAMETHASONE 1 MG: 0.5 SOLUTION ORAL at 05:01

## 2024-01-09 RX ADMIN — AMIODARONE HYDROCHLORIDE 200 MG: 200 TABLET ORAL at 08:01

## 2024-01-09 RX ADMIN — DEXAMETHASONE 1 MG: 0.5 SOLUTION ORAL at 02:01

## 2024-01-09 RX ADMIN — HEPARIN SODIUM 5000 UNITS: 5000 INJECTION INTRAVENOUS; SUBCUTANEOUS at 09:01

## 2024-01-09 NOTE — ASSESSMENT & PLAN NOTE
Patient with chronic lower leg wounds that recently established with wound care. He is set to follow up with vascular surgery outpatient at the end of February.    Appreciate wound care recommendations  Continue diuresis      Stacy Lynn is a 51 y.o. female.   CC: Shortness of breath         Telephone office note.  Due to the pandemic of Covid-19 and social distancing restrictions, the patient has agreed to perform their visit via telephone conference.       HPI: Ms. Miller is a pleasant 51-year-old female who has a known history of chronic respiratory failure with hypoxemia, thrombocytopenia, sarcoidosis, PFO, morbidly obese leukopenia, fibromyalgia, anemia with CKD.  She was seen by Dr. Scherer last month with complaints of fatigue, occasional cough, shortness of breath, arthralgias, diaphoresis where she was reportedly feeling hot and fatigue.  She was sent for a CT of the chest to reevaluate the nodes.  Her methotrexate was placed on hold and she has remained off of that since that time.  He also repeated a chemistry and a CBC with differential.  CT of the chest shows that the lungs were clear with no suspicious pulmonary nodules.  A single enlarged subcarinal lymph node which is decreased in size compared to July 2018.  Other previously enlarged lymph nodes are now normal size.  Labs showed a creatinine of 1.27 with known chronic kidney disease.  CBC showed a white blood cell of 2.72, hemoglobin 9.6, hematocrit 30.2 and is stable.  She states she continues to have eye pain that comes and goes.  She reports is been a very long time since she has seen an eye doctor.  She continues to have an occasional cough and shortness of breath.     The following portions of the patient's history were reviewed and updated as appropriate: allergies, current medications, past family history, past medical history, past social history, past surgical history and problem list.     Medical History        Past Medical History:   Diagnosis Date   • Chest pain       NERVE PAIN IN LUNGS   • Chronic headaches     • Chronic pain     • Chronic respiratory failure (CMS/HCC)     • Colon polyp     • Degeneration of intervertebral disc of high cervical region      • Depression     • GERD (gastroesophageal reflux disease)     • Hypertension     • Hyperthyroidism     • Irritable bowel syndrome     • Kidney stones     • Macular degeneration     • Orthopnea     • Pancreatitis, chronic (CMS/HCC)     • Peripheral neuropathy     • PFO (patent foramen ovale) 09/2016   • PSVT (paroxysmal supraventricular tachycardia) (CMS/HCC)       s/p ablation per Dr. Diallo 4/2016   • Restless leg syndrome     • Sarcoidosis     • Scoliosis     • Septic joint (CMS/HCC)     • Venous insufficiency, peripheral       RIGHT LEG                  Family History   Problem Relation Age of Onset   • Arrhythmia Mother     • Heart disease Mother     • Kidney disease Mother     • Heart disease Father     • Diabetes Father     • Heart disease Brother     • Heart disease Maternal Aunt     • Heart disease Maternal Uncle     • Heart disease Paternal Aunt     • Heart disease Paternal Uncle     • Heart disease Maternal Grandmother     • Heart disease Maternal Grandfather     • Heart disease Paternal Grandmother     • Heart disease Paternal Grandfather     • Colon cancer Neg Hx     • Colon polyps Neg Hx           Social History   Social History            Socioeconomic History   • Marital status: Single       Spouse name: Not on file   • Number of children: Not on file   • Years of education: Not on file   • Highest education level: Not on file   Tobacco Use   • Smoking status: Never Smoker   • Smokeless tobacco: Never Used   Substance and Sexual Activity   • Alcohol use: No   • Drug use: No   • Sexual activity: Defer         Review of Systems - General ROS: positive for  - fatigue and diaphoresis   Respiratory ROS: positive for - cough (occasional )and shortness of breath  Musculoskeletal ROS: positive for - arthralgias with known fibromyalgia         Pulmonary Functions Testing Results:     No results found for this or any previous visit.     My interpretation: none     CT Chest 3/10/20  Findings:     Central  airways are clear. No consolidation or pleural effusion. No  suspicious pulmonary nodules identified. No new or enlarging thoracic  lymph nodes.      Enlarged subcarinal lymph node is decrease in size and measuring 1.6 cm  short axis dimension (1.9 cm short axis in 2018). Small nonenlarged 9 mm  RIGHT hilar lymph node previously measured 1.4 cm. LEFT paratracheal 9  mm short axis dimension lymph node on image 29 previously measured 1.4  cm in 2018.     Uniform thyroid. No central pulmonary artery filling defect. Main  pulmonary trunk is at the upper limits of normal in caliber. Thoracic  aorta is nonaneurysmal. No pericardial effusion.     Multiple low-density liver lesions are similar to prior exams, too small  to characterize, and likely represent small cysts. Gallbladder is  surgically absent. Mild biliary ductal dilatation is likely reservoir  phenomenon. Multilevel thoracic spine degenerative change. No aggressive  bone lesions.     IMPRESSION:  Impression:     1.  Lungs are clear. No suspicious pulmonary nodules.  2.  Single enlarged subcarinal lymph node which is decrease in size  compared to a study from July 2018. Other previously enlarged lymph  nodes on the July 2018 study are now normal size.  This report was finalized on 03/10/2020 16:09 by Dr. Bruno White MD.                Problem List Items Addressed This Visit                 Cardiovascular and Mediastinum      PFO (patent foramen ovale) - Primary      Overview        Per 9/2016 Echo                   Respiratory      Chronic respiratory failure with hypoxia (CMS/HCC)            Digestive      Morbidly obese (CMS/HCC)            Hematopoietic and Hemostatic      Thrombocytopenia (CMS/HCC)            Other      Sarcoidosis           Plan:      Reviewed CT results with patient. She will remain off of the Methotrexate. Discussed case with Dr. Scherer. We will plan a repeat CT of the chest and follow up appointment in 3-6 months. She is encouraged to  see her eye doctor when restrictions are lifted and she is able to go in. She is encouraged to keep her follow up appointment with hematology/ oncology.      Calista Phan, APRN  3/26/2020  14:25     No follow-ups on file.     This dictation was generated by voice recognition computer software. Although all attempts are made to edit dictation for accuracy, there may be errors in the transcription that are not intended.

## 2024-01-09 NOTE — ASSESSMENT & PLAN NOTE
Patient with Hypoxic Respiratory failure which is Acute.  he is not on home oxygen. Supplemental oxygen was provided and noted-      Patient with persistent O2 requirement of 2-3 LPM. Largest contribution likely due to fluid overload in setting of heart failure exacerbation and possible urinary retention. Also found to be COVID positive, s/p course of remdesivir. With his persistent hypoxia, possible that he is developing a post viral pneumonia, or an aspiration pneumonia in setting of dysphagia.   Troponin and repeat EKG WNL, concern for ACS low.   He does have baseline arrhythmia with PM in place, recently interrogated. No AS on recent TTE.    - Continue lasix gtt (see acute on chronic HF), adding dobutamine gtt  - CXR showing similar to prior, no significant worsening.   - Low threshold to add antibiotic coverage for PNA  - Wean O2 as tolerated  - BNP repeated and still elevated at 2503.  - Consulting cardiology for failure to improve despite diuresis  - PT/OT consulted, as deconditioning may also be contributing. Nursing communication placed to have patient out of bed and in chair with each meal

## 2024-01-09 NOTE — PT/OT/SLP PROGRESS
Physical Therapy Co-Treatment    Patient Name:  Papito Bhakta   MRN:  [id number]    Recommendations:     Discharge Recommendations: Moderate Intensity Therapy  Discharge Equipment Recommendations: wheelchair, bedside commode  Barriers to discharge:  inc level of assist required    Assessment:     Papito Bhakta is a 68 y.o. male admitted with a medical diagnosis of Serum total bilirubin elevated.  He presents with the following impairments/functional limitations: weakness, impaired endurance, impaired self care skills, impaired functional mobility, gait instability, impaired balance, decreased upper extremity function, decreased lower extremity function, decreased safety awareness, pain, impaired cardiopulmonary response to activity, edema. Pt able to ambulate further and w/ less assistance today, but still limited in ability to participate 2/2 pain and dec endurance. D/c rec changed to moderate intensity therapy because of this.    Rehab Prognosis: Good; patient would benefit from acute skilled PT services to address these deficits and reach maximum level of function.    Recent Surgery: Procedure(s) (LRB):  EGD (ESOPHAGOGASTRODUODENOSCOPY) (N/A) 6 Days Post-Op    Plan:     During this hospitalization, patient to be seen 4 x/week to address the identified rehab impairments via gait training, therapeutic activities, therapeutic exercises, neuromuscular re-education and progress toward the following goals:    Plan of Care Expires:  02/07/24    Subjective     Chief Complaint: pt reporting he is extremely tired 2/2 just waking up and would like to be seen later in the morning going forward  Patient/Family Comments/goals: pt wants to rest  Pain/Comfort:  Location 1:  (unrated R ankle and back pain)  Pain Addressed 1: Reposition, Distraction, Cessation of Activity      Objective:     Communicated with RN prior to session.  Patient found HOB elevated with telemetry, pulse ox (continuous), peripheral IV, friend catheter upon  PT entry to room. Co-tx w/ OT 2/2 suspected pt complexity and requirement of 2 skilled therapists to assist in order to maximize pt treatment     General Precautions: Standard, airborne, contact, droplet, fall  Orthopedic Precautions: N/A  Braces: N/A  Respiratory Status: Room air     Functional Mobility:  Bed Mobility:     Supine to Sit: minimum assistance  Transfers:     Sit to Stand:  minimum assistance and of 2 persons with rolling walker and bed elevated  Bed to Chair: minimum assistance with  rolling walker  using  Step Transfer  Gait: 14' w/ RW and CGA w/ one standing rest break where pt demonstrated unsafe utilization of RW by leaning forward resting B forearms on RW   Balance: EOB sitting supervision       AM-PAC 6 CLICK MOBILITY  Turning over in bed (including adjusting bedclothes, sheets and blankets)?: 3  Sitting down on and standing up from a chair with arms (e.g., wheelchair, bedside commode, etc.): 2  Moving from lying on back to sitting on the side of the bed?: 3  Moving to and from a bed to a chair (including a wheelchair)?: 3  Need to walk in hospital room?: 3  Climbing 3-5 steps with a railing?: 1  Basic Mobility Total Score: 15       Treatment & Education:  Pt educated on PT POC/goals, d/c recs, and continued treatment. All questions answered and pt in agreement w/ POC.  Pt educated on safe use of RW for transfers and ambulation as pt frequently grabbing walker by the front bar to try and pull himself up, as well as, leaning B forearms on walker to take breaks    Patient left up in chair with all lines intact, call button in reach, and RN notified..    GOALS:   Multidisciplinary Problems       Physical Therapy Goals          Problem: Physical Therapy    Goal Priority Disciplines Outcome Goal Variances Interventions   Physical Therapy Goal     PT, PT/OT Ongoing, Progressing     Description: Goals to met by 1/21/2024    1. Supine to sit with Stand-by Assistance  2. Sit to supine with Stand-by  Assistance  3. Rolling to Left and Right with Stand-by Assistance.  4. Sit to stand transfer with Stand-by Assistance  5. Bed to chair transfer with Stand-by Assistance using Rolling Walker  6. Gait  x 75 feet with Stand-by Assistance using Rolling Walker   7. Ascend/Descend 6 inch curb step with Contact Guard Assistance using Rolling Walker.  8. Stand for 5 minutes with Stand-by Assistance using Rolling Walker  9. Lower extremity exercise program x15 reps per Instruction, with assistance as needed in order to facilitate improved postural control and improvement in functional independence- MET  Update: w/ independence                       Time Tracking:     PT Received On: 01/09/24  PT Start Time: 0857     PT Stop Time: 0922  PT Total Time (min): 25 min     Billable Minutes: Gait Training 15 and Therapeutic Activity 10    Treatment Type: Treatment  PT/PTA: PT     Number of PTA visits since last PT visit: 0     01/09/2024

## 2024-01-09 NOTE — PT/OT/SLP PROGRESS
Occupational Therapy   Co-Treatment  Co-treatment performed due to patient's multiple deficits requiring two skilled therapists  to appropriately and safely assess patient's strength and endurance while facilitating functional tasks in addition to accommodating for patient's activity tolerance.      Name: Papito Bhakta  MRN: [id number]  Admitting Diagnosis:  Serum total bilirubin elevated  6 Days Post-Op    Recommendations:     Discharge Recommendations: Moderate Intensity Therapy  Discharge Equipment Recommendations:  wheelchair, bedside commode  Barriers to discharge:  None    Assessment:     Papito Bhakta is a 68 y.o. male with a medical diagnosis of Serum total bilirubin elevated.  He presents with agitation this morning possibly due to just waking up per pt report and displeased with his progress He did demonstrate improvement with functional mobility with AD in his room. Pt continues to demonstrate unsafe technique with RW during static standing and requires verbal cueing and pt-reducation to ensure safety and proper technique for RW management. Weakness and decreased AROM of B shoulders demonstrated  while seated in chair.   Performance deficits affecting function are weakness, impaired functional mobility, decreased safety awareness, impaired cardiopulmonary response to activity, impaired endurance, gait instability, impaired balance, impaired skin, decreased lower extremity function, decreased ROM, impaired self care skills, decreased upper extremity function.     Rehab Prognosis:  Good; patient would benefit from acute skilled OT services to address these deficits and reach maximum level of function.       Plan:     Patient to be seen 4 x/week to address the above listed problems via self-care/home management, therapeutic activities, therapeutic exercises  Plan of Care Expires: 02/07/24  Plan of Care Reviewed with: patient    Subjective     Chief Complaint: Just waking up and B UE weakness  Patient/Family  Comments/goals: Pt reports he prefers if therapy was performed later in the mornings  Pain/Comfort:  Pain Rating 1: other (see comments) (Did not specify)    Objective:     Communicated with: Nurse prior to session.  Patient found supine with telemetry, pulse ox (continuous), peripheral IV, friend catheter upon OT entry to room.    General Precautions: Standard, airborne, contact, droplet, fall    Orthopedic Precautions:N/A  Braces: N/A  Respiratory Status: Room air     Occupational Performance:     Bed Mobility:    Patient completed Scooting/Bridging with stand by assistance  Patient completed Supine to Sit with minimum assistance for R LE OOB    Functional Mobility/Transfers:  Patient completed Sit <> Stand Transfer with minimum assistance and of 2 persons  with  rolling walker   Functional Mobility: Pt performed functional mobility with RW CGA x 2 persons within the room to improve static/dynamic balance and simulate household and/or community distances to perform occupations of choice.  Pt took standing rest break with forward leaning/bending at waist to rest B UEs on RW. Verbal cueing provided to correct posture although pt adamant of taking static stand rest break his way although unsafe.  Stand>sit in bed side chair with RW CGA x 2 persons and verbal cueing to ensure safety and proper technique    Activities of Daily Living:  Grooming: Pt performed washing face via setup seated up in chair       [facility] 6 Click ADL: 18    Treatment & Education:  Pt performed bed mobility, functional mobility, transfers and ADLs as documented above.   Pt performed B UE exercises seated in chair: B elbow flexion/ext with pillow 2 sets x 10 reps and B scapula elevation/depression. Pt unable/self-limiting with pt participation during therex. Pt educated and instructed on the following: OT POC, towel slides to perform shoulder flexion in gravity decreased plane on bedside tray, B elbow flex/ext, and scap elevation/depression; using  call bell for assistance; proper body mechanics and RW management for safety of transfers. Pt verbalized understanding.      Patient left up in chair with all lines intact, call button in reach, and R LE elevated on wedge/pillows for comfort. Pt appears in NAD.    GOALS:   Multidisciplinary Problems       Occupational Therapy Goals          Problem: Occupational Therapy    Goal Priority Disciplines Outcome Interventions   Occupational Therapy Goal     OT, PT/OT Ongoing, Progressing    Description: Goals to be met by: 2/7/24 (1 month)      Patient will increase functional independence with ADLs by performing:    UE Dressing with Supervision.  LE Dressing with Supervision.  Grooming while standing at sink with Modified [place].  Toileting from toilet with Modified [place] for hygiene and clothing management.   Rolling to Bilateral with [place].   Supine to sit with [place].  Step transfer with Modified [place]  Toilet transfer to toilet with Modified [place].                       Time Tracking:     OT Date of Treatment: 01/09/24  OT Start Time: 0857  OT Stop Time: 0922  OT Total Time (min): 25 min    Billable Minutes:Therapeutic Activity 15  Therapeutic Exercise 10    OT/DIONICIO: DIONICIO     Number of DIONICIO visits since last OT visit: 2    1/9/2024   Walk in Private Auto

## 2024-01-09 NOTE — HPI
68M w/ NICM rEF 10-15%, ICD in place, mild-to-moderate mitral valve regurgitation, HTN, HLD, AMELIA, PVD, obesity, chronic lower extremity swelling with chronic recurring leg wounds who presented to the ED with multiple complaints, incl leg swelling, fatigue, shortwindedness and RIGOBERTO. Dieresis was initiated although patient not diuresing adequately, now requiring lasix and dobutamine gtt. Hepatology consulted for hyperbilirubinemia which has been worsening since admission. Of note patient has had hyperbilirubinemia which appears to correlate with admissions for CHF exacerbation.

## 2024-01-09 NOTE — CONSULTS
[facility] - [facility]iology Stepdown  Hepatology  Consult Note    Patient Name: Papito Bhakta  MRN: [id number]  Admission Date: 12/29/2023  Hospital Length of Stay: 7 days  Attending Provider: Shell Medrano*   Primary Care Physician: Brynn To MD  Principal Problem:Serum total bilirubin elevated    Consults  Subjective:     Transplant status: No    HPI:  68M w/ NICM rEF 10-15%, ICD in place, mild-to-moderate mitral valve regurgitation, HTN, HLD, AMELIA, PVD, obesity, chronic lower extremity swelling with chronic recurring leg wounds who presented to the ED with multiple complaints, incl leg swelling, fatigue, shortwindedness and RIGOBERTO. Dieresis was initiated although patient not diuresing adequately, now requiring lasix and dobutamine gtt. Hepatology consulted for hyperbilirubinemia which has been worsening since admission. Of note patient has had hyperbilirubinemia which appears to correlate with admissions for CHF exacerbation.    Review of Systems   Constitutional:  Negative for activity change, appetite change, chills, fatigue and fever.   Eyes:  Negative for photophobia and visual disturbance.   Respiratory:  Negative for cough, chest tightness, shortness of breath and wheezing.    Cardiovascular:  Negative for chest pain.   Gastrointestinal:  Negative for abdominal distention, abdominal pain, blood in stool, constipation, diarrhea, nausea and vomiting.   Genitourinary:  Negative for difficulty urinating and dysuria.   Musculoskeletal:  Negative for gait problem.   Skin:  Negative for color change.   Neurological:  Negative for dizziness, weakness, light-headedness and headaches.   Psychiatric/Behavioral:  Negative for agitation, behavioral problems and confusion.        Past Medical History:   Diagnosis Date    RIGOBERTO (acute kidney injury) 10/7/2020    Anticoagulant long-term use     Aspirin    CHF (congestive heart failure)     Hyperlipidemia     Hypertension     Microcytic anemia 12/31/2023    NICM  (nonischemic cardiomyopathy) 6/16/2020    Obesity     AMELIA (obstructive sleep apnea) 1/7/2022    Peripheral vascular disease, unspecified 2/1/2021       Past Surgical History:   Procedure Laterality Date    ESOPHAGOGASTRODUODENOSCOPY N/A 1/3/2024    Procedure: EGD (ESOPHAGOGASTRODUODENOSCOPY);  Surgeon: Vaughn Oliver MD;  Location: [facility] ENDO ([facility] FLR);  Service: Endoscopy;  Laterality: N/A;    INSERTION OF BIVENTRICULAR IMPLANTABLE CARDIOVERTER-DEFIBRILLATOR (ICD) N/A 02/13/2019    Procedure: INSERTION, ICD, BIVENTRICULAR;  Surgeon: Shailesh Eng MD;  Location: [facility] CATH LAB;  Service: Cardiology;  Laterality: N/A;  RN PRE OP 2-6-19  1ST CASE PER  [facility]. NOTIFIED [facility] THAT ANESTHESIA IS NOT PITO FOR 1ST CASE START-LO    INSERTION OF BIVENTRICULAR IMPLANTABLE CARDIOVERTER-DEFIBRILLATOR (ICD) N/A 09/28/2020    Procedure: INSERTION, ICD, BIVENTRICULAR;  Surgeon: Jim Kwong MD;  Location: [facility] EP LAB;  Service: Cardiology;  Laterality: N/A;  NICM, CRT-D, SJM,, MAC, DM, 3 Prep*Wearing LifeVest*    oral extraction  11/2018    TESTICLE SURGERY         Family history of liver disease: No    Review of patient's allergies indicates:   Allergen Reactions    Ramipril      Leg swelling and gynecomastia     Losartan Rash         Tobacco Use    Smoking status: Former     Current packs/day: 0.00     Average packs/day: 0.5 packs/day for 40.0 years (20.0 ttl pk-yrs)     Types: Cigarettes, Cigars     Start date: 1974     Quit date: 2014     Years since quitting: 10.0     Passive exposure: Current    Smokeless tobacco: Never   Substance and Sexual Activity    Alcohol use: Yes     Comment: once a month beer or liquor    Drug use: No    Sexual activity: Not Currently     Birth control/protection: None     Comment: uses protection sometimes       Medications Prior to Admission   Medication Sig Dispense Refill Last Dose    amiodarone (PACERONE) 200 MG Tab Take 1 tablet (200 mg total) by mouth once daily. 90 tablet 3 12/29/2023     furosemide (LASIX) 80 MG tablet TAKE 1 TABLET EVERY DAY 90 tablet 1 12/29/2023    metoprolol succinate (TOPROL-XL) 50 MG 24 hr tablet TAKE 1 TABLET EVERY DAY 90 tablet 3 12/29/2023    potassium chloride (K-TAB) 20 mEq TAKE 1 TABLET EVERY DAY 90 tablet 1 12/29/2023    pravastatin (PRAVACHOL) 80 MG tablet TAKE 1 TABLET EVERY DAY 90 tablet 1 12/29/2023    spironolactone (ALDACTONE) 25 MG tablet TAKE 1/2 TABLET (12.5 MG TOTAL) ONCE DAILY. HOLD IF SYSTOLIC BLOOD PRESSURE IS LESS THAN 110 45 tablet 5 12/29/2023    aspirin (ECOTRIN) 325 MG EC tablet Take 1 tablet (325 mg total) by mouth once daily. 90 tablet 3     empagliflozin (JARDIANCE) 10 mg tablet Take 1 tablet (10 mg total) by mouth once daily. 90 tablet 3        Objective:     Vital Signs (Most Recent):  Temp: 98.9 °F (37.2 °C) (01/09/24 0415)  Pulse: 83 (01/09/24 0416)  Resp: 18 (01/09/24 0415)  BP: (!) 96/57 (01/09/24 0416)  SpO2: (!) 93 % (01/09/24 0415) Vital Signs (24h Range):  Temp:  [97 °F (36.1 °C)-98.9 °F (37.2 °C)] 98.9 °F (37.2 °C)  Pulse:  [73-85] 83  Resp:  [18-20] 18  SpO2:  [93 %-99 %] 93 %  BP: ()/(57-75) 96/57     Weight: 108.5 kg (239 lb 3.2 oz) (01/09/24 0417)  Body mass index is 28.36 kg/m².       Physical Exam  Vitals and nursing note reviewed.   Constitutional:       General: He is not in acute distress.     Appearance: Normal appearance. He is ill-appearing.   HENT:      Head: Normocephalic and atraumatic.      Right Ear: External ear normal.      Left Ear: External ear normal.      Nose: Nose normal.   Eyes:      General: Scleral icterus present.      Conjunctiva/sclera: Conjunctivae normal.      Pupils: Pupils are equal, round, and reactive to light.   Cardiovascular:      Rate and Rhythm: Normal rate and regular rhythm.   Pulmonary:      Effort: Pulmonary effort is normal. No respiratory distress.   Abdominal:      General: Abdomen is flat. There is no distension.      Tenderness: There is no abdominal tenderness.    Musculoskeletal:         General: Normal range of motion.      Cervical back: Normal range of motion.      Right lower leg: Edema present.      Left lower leg: Edema present.   Skin:     Coloration: Skin is not jaundiced.      Findings: No bruising.   Neurological:      Mental Status: He is alert and oriented to person, place, and time. Mental status is at baseline.            MELD 3.0: 22 at 12/6/2023  9:32 AM  MELD-Na: 21 at 12/6/2023  9:32 AM  Calculated from:  Serum Creatinine: 2.0 mg/dL at 12/6/2023  9:32 AM  Serum Sodium: 136 mmol/L at 12/6/2023  9:32 AM  Total Bilirubin: 4.0 mg/dL at 12/6/2023  9:32 AM  Serum Albumin: 3.0 g/dL at 12/6/2023  9:32 AM  INR(ratio): 1.2 at 12/6/2023  9:32 AM  Age at listing (hypothetical): 68 years  Sex: Male at 12/6/2023  9:32 AM      Significant Labs:  CBC:   Recent Labs   Lab 01/09/24  0509   WBC 8.08   RBC 5.36   HGB 12.7*   HCT 36.9*   PLT 89*     CMP:   Recent Labs   Lab 01/09/24  0509   GLU 94   CALCIUM 8.7   ALBUMIN 2.1*   PROT 5.9*   *   K 3.2*   CO2 28   CL 96   BUN 49*   CREATININE 1.3   ALKPHOS 97   ALT 30   AST 32   BILITOT 9.8*       Significant Imaging:  US Liver with doppler showing: Bidirectional portal vein flow may be seen with right heart failure, tricuspid regurgitation, or portal hypertension. Evidence of volume overload with distended IVC and hepatic veins.  Pulsatile flow through the hepatic veins (also possibly suggesting tricuspid regurgitation). Suspected hepatic steatosis. No evidence of biliary obstruction   Assessment/Plan:     GI  * Serum total bilirubin elevated  68 year old M with significant cardiac hx, CHF with EF 10-15% with ICD. Hepatology consulted for increasing T bili since admission. Direct hyperbilirubinemia with no signs of hemolysis. Hyperbilirubinemia noted in November 2023 also when patient was admitted for CHF exacerbation. Liver US done showing: Bidirectional portal vein flow may be seen with right heart failure, tricuspid  regurgitation, or portal hypertension. Evidence of volume overload with distended IVC and hepatic veins.  Pulsatile flow through the hepatic veins (also possibly suggesting tricuspid regurgitation). Suspected hepatic steatosis. No evidence of biliary obstruction. Recent hepatitis panel negative. Elevated Tbili likely 2/2 congestive hepatopathy considering patient diuresis appeared to stall leading to patient requiring dobutamine gtt along with Lasix.    Recommendations:  - Continue diuresis per cardiology recommendations and monitor Tbili   - Will obtain serologic studies to assess for autoimmune hepatitis  - Do not recommend liver biopsy at this time unless it is planned for patient to get LVAD or heart transplant  - Rest of care per primary team        Thank you for your consult. I will follow-up with patient. Please contact us if you have any additional questions.    Tone Jasso MD  Hepatology  [facility] - Cardiology Stepdown

## 2024-01-09 NOTE — ASSESSMENT & PLAN NOTE
NICM EF 10-15% currently admitted for CHF exacerbation, now net negative 8L since admit. Remains volume overloaded with minimal urine output on lasix ggt 15mg/hr.    Most recent TTE 11/12 with EF 10-15%, G3DD, severely dilated left ventricle, severe right ventricular enlargement with severely reduced systolic function.    Recommendations:  - Continue  ggt 2.5 mcg/kg/min and lasix ggt 20mg/hr, monitor response.  Goal diuresis 1-2L/day  - Can consider PRN metolazone 5mg if UO < 100cc/hr  - Fluid restriction at 1500 cc with strict I/Os and daily weights   - History of intolerance to GDMT 2/2 hypotension, continue jardiance, once euvolemic will consider re-introduction of low dose GDMT as tolerated.    - Maintain on telemetry and daily EKGs   - Up to date risk stratification : TSH, Lipids, HbA1c with optimization of risk factors is necessary  - Check Electrolytes, keep Mag >2 & K+ >4  - SCDs, TEDs, Nursing communication to elevated LE   - Ambulate as tolerated

## 2024-01-09 NOTE — ASSESSMENT & PLAN NOTE
Continues to increase. Direct bilirubinemia. No signs of hemolysis.    - Hepatology recommending autoimmune hepatitis workup  - Daily CMPs  - Appears chronic since November. Previously reported as secondary to congestive hepatopathy however remaining LFTs likely WNL.   - US Liver with doppler showing: Bidirectional portal vein flow may be seen with right heart failure, tricuspid regurgitation, or portal hypertension. Evidence of volume overload with distended IVC and hepatic veins.  Pulsatile flow through the hepatic veins (also possibly suggesting tricuspid regurgitation). Suspected hepatic steatosis. No evidence of biliary obstruction.  Possible genetic condition

## 2024-01-09 NOTE — SUBJECTIVE & OBJECTIVE
Review of Systems   Constitutional:  Negative for activity change, appetite change, chills, fatigue and fever.   Eyes:  Negative for photophobia and visual disturbance.   Respiratory:  Negative for cough, chest tightness, shortness of breath and wheezing.    Cardiovascular:  Negative for chest pain.   Gastrointestinal:  Negative for abdominal distention, abdominal pain, blood in stool, constipation, diarrhea, nausea and vomiting.   Genitourinary:  Negative for difficulty urinating and dysuria.   Musculoskeletal:  Negative for gait problem.   Skin:  Negative for color change.   Neurological:  Negative for dizziness, weakness, light-headedness and headaches.   Psychiatric/Behavioral:  Negative for agitation, behavioral problems and confusion.        Past Medical History:   Diagnosis Date    RIGOBERTO (acute kidney injury) 10/7/2020    Anticoagulant long-term use     Aspirin    CHF (congestive heart failure)     Hyperlipidemia     Hypertension     Microcytic anemia 12/31/2023    NICM (nonischemic cardiomyopathy) 6/16/2020    Obesity     AMELIA (obstructive sleep apnea) 1/7/2022    Peripheral vascular disease, unspecified 2/1/2021       Past Surgical History:   Procedure Laterality Date    ESOPHAGOGASTRODUODENOSCOPY N/A 1/3/2024    Procedure: EGD (ESOPHAGOGASTRODUODENOSCOPY);  Surgeon: Vaughn Oliver MD;  Location: [facility] ENDO [address]);  Service: Endoscopy;  Laterality: N/A;    INSERTION OF BIVENTRICULAR IMPLANTABLE CARDIOVERTER-DEFIBRILLATOR (ICD) N/A 02/13/2019    Procedure: INSERTION, ICD, BIVENTRICULAR;  Surgeon: Shailesh Eng MD;  Location: [facility] CATH LAB;  Service: Cardiology;  Laterality: N/A;  RN PRE OP 2-6-19  1ST CASE PER  [facility]. NOTIFIED [facility] THAT ANESTHESIA IS NOT PITO FOR 1ST CASE START-LO    INSERTION OF BIVENTRICULAR IMPLANTABLE CARDIOVERTER-DEFIBRILLATOR (ICD) N/A 09/28/2020    Procedure: INSERTION, ICD, BIVENTRICULAR;  Surgeon: Jim Kwong MD;  Location: [facility] EP LAB;  Service: Cardiology;  Laterality:  N/A;  NICM, CRT-D, SJM,, MAC, DM, 3 Prep*Wearing LifeVest*    oral extraction  11/2018    TESTICLE SURGERY         Family history of liver disease: No    Review of patient's allergies indicates:   Allergen Reactions    Ramipril      Leg swelling and gynecomastia     Losartan Rash         Tobacco Use    Smoking status: Former     Current packs/day: 0.00     Average packs/day: 0.5 packs/day for 40.0 years (20.0 ttl pk-yrs)     Types: Cigarettes, Cigars     Start date: 1974     Quit date: 2014     Years since quitting: 10.0     Passive exposure: Current    Smokeless tobacco: Never   Substance and Sexual Activity    Alcohol use: Yes     Comment: once a month beer or liquor    Drug use: No    Sexual activity: Not Currently     Birth control/protection: None     Comment: uses protection sometimes       Medications Prior to Admission   Medication Sig Dispense Refill Last Dose    amiodarone (PACERONE) 200 MG Tab Take 1 tablet (200 mg total) by mouth once daily. 90 tablet 3 12/29/2023    furosemide (LASIX) 80 MG tablet TAKE 1 TABLET EVERY DAY 90 tablet 1 12/29/2023    metoprolol succinate (TOPROL-XL) 50 MG 24 hr tablet TAKE 1 TABLET EVERY DAY 90 tablet 3 12/29/2023    potassium chloride (K-TAB) 20 mEq TAKE 1 TABLET EVERY DAY 90 tablet 1 12/29/2023    pravastatin (PRAVACHOL) 80 MG tablet TAKE 1 TABLET EVERY DAY 90 tablet 1 12/29/2023    spironolactone (ALDACTONE) 25 MG tablet TAKE 1/2 TABLET (12.5 MG TOTAL) ONCE DAILY. HOLD IF SYSTOLIC BLOOD PRESSURE IS LESS THAN 110 45 tablet 5 12/29/2023    aspirin (ECOTRIN) 325 MG EC tablet Take 1 tablet (325 mg total) by mouth once daily. 90 tablet 3     empagliflozin (JARDIANCE) 10 mg tablet Take 1 tablet (10 mg total) by mouth once daily. 90 tablet 3        Objective:     Vital Signs (Most Recent):  Temp: 98.9 °F (37.2 °C) (01/09/24 0415)  Pulse: 83 (01/09/24 0416)  Resp: 18 (01/09/24 0415)  BP: (!) 96/57 (01/09/24 0416)  SpO2: (!) 93 % (01/09/24 0415) Vital Signs (24h Range):  Temp:   [97 °F (36.1 °C)-98.9 °F (37.2 °C)] 98.9 °F (37.2 °C)  Pulse:  [73-85] 83  Resp:  [18-20] 18  SpO2:  [93 %-99 %] 93 %  BP: ()/(57-75) 96/57     Weight: 108.5 kg (239 lb 3.2 oz) (01/09/24 0417)  Body mass index is 28.36 kg/m².       Physical Exam  Vitals and nursing note reviewed.   Constitutional:       General: He is not in acute distress.     Appearance: Normal appearance. He is ill-appearing.   HENT:      Head: Normocephalic and atraumatic.      Right Ear: External ear normal.      Left Ear: External ear normal.      Nose: Nose normal.   Eyes:      General: Scleral icterus present.      Conjunctiva/sclera: Conjunctivae normal.      Pupils: Pupils are equal, round, and reactive to light.   Cardiovascular:      Rate and Rhythm: Normal rate and regular rhythm.   Pulmonary:      Effort: Pulmonary effort is normal. No respiratory distress.   Abdominal:      General: Abdomen is flat. There is no distension.      Tenderness: There is no abdominal tenderness.   Musculoskeletal:         General: Normal range of motion.      Cervical back: Normal range of motion.      Right lower leg: Edema present.      Left lower leg: Edema present.   Skin:     Coloration: Skin is not jaundiced.      Findings: No bruising.   Neurological:      Mental Status: He is alert and oriented to person, place, and time. Mental status is at baseline.            MELD 3.0: 22 at 12/6/2023  9:32 AM  MELD-Na: 21 at 12/6/2023  9:32 AM  Calculated from:  Serum Creatinine: 2.0 mg/dL at 12/6/2023  9:32 AM  Serum Sodium: 136 mmol/L at 12/6/2023  9:32 AM  Total Bilirubin: 4.0 mg/dL at 12/6/2023  9:32 AM  Serum Albumin: 3.0 g/dL at 12/6/2023  9:32 AM  INR(ratio): 1.2 at 12/6/2023  9:32 AM  Age at listing (hypothetical): 68 years  Sex: Male at 12/6/2023  9:32 AM      Significant Labs:  CBC:   Recent Labs   Lab 01/09/24  0509   WBC 8.08   RBC 5.36   HGB 12.7*   HCT 36.9*   PLT 89*     CMP:   Recent Labs   Lab 01/09/24  0509   GLU 94   CALCIUM 8.7    ALBUMIN 2.1*   PROT 5.9*   *   K 3.2*   CO2 28   CL 96   BUN 49*   CREATININE 1.3   ALKPHOS 97   ALT 30   AST 32   BILITOT 9.8*       Significant Imaging:  US Liver with doppler showing: Bidirectional portal vein flow may be seen with right heart failure, tricuspid regurgitation, or portal hypertension. Evidence of volume overload with distended IVC and hepatic veins.  Pulsatile flow through the hepatic veins (also possibly suggesting tricuspid regurgitation). Suspected hepatic steatosis. No evidence of biliary obstruction

## 2024-01-09 NOTE — PROGRESS NOTES
Dmitry Colon - Cardiology [facility] Medicine  Progress Note    Patient Name: Papito Bhakta  MRN: [id number]  Patient Class: IP- Inpatient   Admission Date: 12/29/2023  Length of Stay: 7 days  Attending Physician: Shell Medrano*  Primary Care Provider: Brynn To MD        Subjective:     Principal Problem:Serum total bilirubin elevated        HPI:  Papito Bhakta is a 68 y.o. male with NICM, combined CHF(11/2023 EF 10 -15%, G3DD) s/p ICD placement, CKD, HLD, HTN, and AMELIA who presents for bilateral lower extremity swelling and shortness of breath. Patient reports he has been having worsening shortness of breath for the past 6 months. He had acutely worsening SOB today where he described becoming profoundly dyspneic on exertion. He reported taking 2 of his 80 mg Lasix this morning with subsequent minimal urine output and minimal improvement in his SOB. He reports SOB aggravated with lying down and he requires frequent positional changes to keep comfortable. He reports additional poor appetite and urine output for the past week although he reports he has been drinking well. He had 2 episodes of nausea/vomiting this past week as well. He denies fever/chills, chest pain, abdominal pain, recent illness, medication changes. Patient reports adherence with all medications. He reports he lives with his daughter who helps him with his medications and healthcare.    Additionally he reports chronic leg pain from a chronic RLE wound. He went to wound care yesterday where dressings were changed and he was told they were healing well. He has bilateral lower extremity edema R>L that is typical for him and he denies recent worsening.    Overview/Hospital Course:  Pt admitted to hospital medicine for acute heart failure exacerbation and inability to swallow solids for 3 months duration. Initiated on IV lasix. SLP evaluated the patient and determined that he could tolerate liquids. He had a new leukocytosis noted on  12/31, work up significant for COVID positive. He completed remdesivir, steroids held in order to prevent worsening fluid retention, and was able to be weaned to room oxygen by 1/4. Overnight 12/31, patient becane tachycardic and hypotensive (asymptomatic). Cardiology called and concluded that he was in atrial fibrillation with RVR and he was briefly changed from oral amiodarone to IV amiodarone for one day before resuming his oral dosing. PM interrogated, noted to be functioning accurately. Cardiology signed off. IV lasix increased from pushes to gtt. Urine output did not significantly increase, bladder scan with 325ml urine. Mcmahon placed.     GI was consulted for his ongoing dysphagia. With his ongoing aggressive diuresis, COVID infection, and episode of hemodynamic stability, the EGD was deferred until he becomes more euvolemic.     Transient episode of increased respiratory distress 1/6. EKG without significant changes from prior. Troponin negative. CXR showed stability from prior imaging.    During his admission, noted to have chronic bilirubinemia, assumed likely secondary to congestive hepatopathy. RUQ US without biliary obstruction. Hemolysis labs negative. Tbili continued to uptrend despite improvement in fluid status and renal function. Also noted to have worsening thrombocytopenia, despite normal LFTs. He has chronic lower extremity leg wounds secondary to peripheral vascular disease. Wound care was consulted and assisted in managing the wounds during his admission. He has outpatient follow-up scheduled with vascular surgery at the end of February. Cardiology recommending aggressive diuresis, started him on a dobutamine gtt. Hepatology consulted.    Interval History: Runs of NSVT overnight. Afebrile. Soft BPs, otherwise HDS. T. Bili continues to uptrend. Had 2.15L UOP, continuing aggressive diuresis.    Objective:     Vital Signs (Most Recent):  Temp: 97.2 °F (36.2 °C) (01/09/24 0749)  Pulse: 86 (01/09/24  0749)  Resp: 18 (01/09/24 0749)  BP: 101/61 (01/09/24 0749)  SpO2: 96 % (01/09/24 0749) Vital Signs (24h Range):  Temp:  [97 °F (36.1 °C)-98.9 °F (37.2 °C)] 97.2 °F (36.2 °C)  Pulse:  [73-86] 86  Resp:  [18-20] 18  SpO2:  [93 %-99 %] 96 %  BP: ()/(57-75) 101/61     Weight: 108.5 kg (239 lb 3.2 oz)  Body mass index is 28.36 kg/m².    Intake/Output Summary (Last 24 hours) at 1/9/2024 0890  Last data filed at 1/9/2024 0436  Gross per 24 hour   Intake --   Output 2150 ml   Net -2150 ml         Physical Exam  Vitals and nursing note reviewed.   Constitutional:       Appearance: He is ill-appearing.   HENT:      Head: Normocephalic and atraumatic.      Mouth/Throat:      Mouth: Mucous membranes are dry.   Eyes:      General: Scleral icterus present.      Extraocular Movements: Extraocular movements intact.      Comments: Anisocoria   Neck:      Vascular: JVD (to the ear) present.   Cardiovascular:      Rate and Rhythm: Normal rate and regular rhythm.      Heart sounds:      Gallop present.   Pulmonary:      Effort: Pulmonary effort is normal. No respiratory distress.      Breath sounds: Wheezing (expiratory) present.   Abdominal:      General: Abdomen is flat. There is no distension.      Palpations: Abdomen is soft.      Tenderness: There is no abdominal tenderness.   Musculoskeletal:         General: Tenderness (b/l LE) present.      Right lower leg: Edema present.      Left lower leg: Edema present.   Skin:     General: Skin is warm and dry.   Neurological:      General: No focal deficit present.      Mental Status: He is alert and oriented to person, place, and time.      Motor: Weakness present.   Psychiatric:         Mood and Affect: Mood normal.         Behavior: Behavior normal.             Significant Labs: All pertinent labs within the past 24 hours have been reviewed.    Significant Imaging: I have reviewed all pertinent imaging results/findings within the past 24 hours.    Assessment/Plan:      * Serum  total bilirubin elevated  Continues to increase. Direct bilirubinemia. No signs of hemolysis.    - Hepatology recommending autoimmune hepatitis workup  - Daily CMPs  - Appears chronic since November. Previously reported as secondary to congestive hepatopathy however remaining LFTs likely WNL.   - US Liver with doppler showing: Bidirectional portal vein flow may be seen with right heart failure, tricuspid regurgitation, or portal hypertension. Evidence of volume overload with distended IVC and hepatic veins.  Pulsatile flow through the hepatic veins (also possibly suggesting tricuspid regurgitation). Suspected hepatic steatosis. No evidence of biliary obstruction.  Possible genetic condition      CKD (chronic kidney disease)  Creatine stable for now. BMP reviewed- noted Estimated Creatinine Clearance: 74.5 mL/min (based on SCr of 1.3 mg/dL). according to latest data. Based on current GFR, CKD stage is stage 3 - GFR 30-59.  Monitor UOP and serial BMP and adjust therapy as needed. Renally dose meds. Avoid nephrotoxic medications and procedures.    COVID-19  COVID positive, noted 12/31    - Finished remdesivir course 01/02/24    Microcytic anemia  Iron studies notable for Fe 26 and sat iron 8%. TIBC, ferritin, transferrin wnl. Likely iron deficiency anemia.    - Daily CBCs  - Feraheme given    Dysphagia  Reports a 3 month history of inability to tolerate solid foods. Notable vomiting immediatly with swallowing food/liquid. No complaints of nausea. SLP has assessed him, can tolerate liquids.     - Continue liquid diet, Boost TID  - GI cancelled EGD and signing off. Will re-consult once patient is more euvolemic and hemodynamically stable.       Pupil asymmetry  Notable asymetry on pupils by patient. Chart review shows left orbital trauma in 2021 with notes concerned for dilation and vision changes. When talking to daughter, this is chronic. States no vision changes or neuro symptoms whatsoever.     Acute on chronic  combined systolic and diastolic CHF (congestive heart failure)  Patient is identified as having Combined Systolic and Diastolic heart failure that is Acute on chronic. CHF is currently uncontrolled due to Pulmonary edema/pleural effusion on CXR. Latest ECHO performed and demonstrates- Results for orders placed during the hospital encounter of 11/10/23    Echo    Interpretation Summary    Left Ventricle: The left ventricle is severely dilated. Mildly increased wall thickness. There is mild concentric hypertrophy. Severe global hypokinesis present. There is severely reduced systolic function with a visually estimated ejection fraction of 10 -15%. Grade III diastolic dysfunction.    Right Ventricle: Severe right ventricular enlargement. Systolic function is severely reduced.    Mitral Valve: There is no stenosis. There is mild to moderate regurgitation with a centrally directed jet.    Tricuspid Valve: There is mild to moderate regurgitation.    Pulmonary Artery: The estimated pulmonary artery systolic pressure is 67 mmHg.  .Last BNP reviewed- and noted below   Recent Labs   Lab 01/07/24  0755   BNP 2,503*         Presented for acute on chronic SOB with associated low UOP. Afebrile HDS. BNP 3,067, Troponin 0.031. CXR with cardiomegaly, vascular congestion, and edema. Received 100 of Lasix in the ED.    - Lasix gtt increased to 20, dobutamine gtt  - Cardiology following  - RIP positive for Covid, treatment completed.  - continue home Jardiance   - Beta blocker/aldactone held given soft pressures, resume when appropriate  - Cardiac diet with Fluid restriction at 1.5L with strict I/Os and daily STANDING weights  - Check Electrolytes, keep Mag >2 & K+ >4  - SCDs, Ambulate as tolerated    - Strict I&Os, Daily standing weights  - Review Low-salt diet and enforce medication adherence on discharge    Acute renal failure superimposed on stage 3a chronic kidney disease  Creatinine 2.1 on admit, baseline around 1.4. Likely  prerenal etiology given urine sodium and FENa vs progression of CKD. Improving with increased diuresis     Recent Labs     01/07/24  0755 01/08/24  0430 01/09/24  0509   BUN 56* 51* 49*   CREATININE 1.5* 1.4 1.3         Estimated Creatinine Clearance: 74.5 mL/min (based on SCr of 1.3 mg/dL).  Improving    - Renal US: renal cysts, no hydronephrosis  - Urine Na: 14, Pr/Cr: 0.72; FENa 0.2%  - Monitor urine output and serial BMP and adjust therapy as needed.   - Strict I&Os and daily weights   - Avoid nephrotoxic agents such as NSAIDs, gadolinium and IV radiocontrast.  - Renally dose meds to current GFR.  - Maintain MAP > 65.  - Nephrology referral outpatient    AMELIA (obstructive sleep apnea)  Carried diagnosis of AMELIA per chart, however he does not sleep with CPAP at home and declines while here      Peripheral vascular disease, unspecified  Patient with chronic lower leg wounds that recently established with wound care. He is set to follow up with vascular surgery outpatient at the end of February.    Appreciate wound care recommendations  Continue diuresis       NICM (nonischemic cardiomyopathy)  ASA 325mg qd per home medication list. Reason for this dose unclear.      Biventricular ICD (implantable cardioverter-defibrillator) in place  Stable on admission, no acute issues, he denies discharge. QTc chronically prolonged. Continue home amiodarone for NSVT prevention. Monitor on telemetry     - 12/31, noted to have AF RVR with hypotension. Started on amio gtt. Per report, pacemaker malfunctioning.  - Restarted on home oral amio  - Device interrogated, no complications. EP has signed off    Hyperlipidemia  Stable. Continue Home Pravastatin.        Acute hypoxemic respiratory failure  Patient with Hypoxic Respiratory failure which is Acute.  he is not on home oxygen. Supplemental oxygen was provided and noted-      Patient with persistent O2 requirement of 2-3 LPM. Largest contribution likely due to fluid overload in  setting of heart failure exacerbation and possible urinary retention. Also found to be COVID positive, s/p course of remdesivir. With his persistent hypoxia, possible that he is developing a post viral pneumonia, or an aspiration pneumonia in setting of dysphagia.   Troponin and repeat EKG WNL, concern for ACS low.   He does have baseline arrhythmia with PM in place, recently interrogated. No AS on recent TTE.    - Continue lasix gtt (see acute on chronic HF), adding dobutamine gtt  - CXR showing similar to prior, no significant worsening.   - Low threshold to add antibiotic coverage for PNA  - Wean O2 as tolerated  - BNP repeated and still elevated at 2503.  - Consulting cardiology for failure to improve despite diuresis  - PT/OT consulted, as deconditioning may also be contributing. Nursing communication placed to have patient out of bed and in chair with each meal      Essential hypertension  Chronic, controlled. Latest blood pressure and vitals reviewed-     Temp:  [97 °F (36.1 °C)-98.9 °F (37.2 °C)]   Pulse:  [73-86]   Resp:  [18-20]   BP: ()/(57-75)   SpO2:  [93 %-99 %] .   Home meds for hypertension were reviewed and noted below.   Hypertension Medications               furosemide (LASIX) 80 MG tablet TAKE 1 TABLET EVERY DAY    metoprolol succinate (TOPROL-XL) 50 MG 24 hr tablet TAKE 1 TABLET EVERY DAY    spironolactone (ALDACTONE) 25 MG tablet TAKE 1/2 TABLET (12.5 MG TOTAL) ONCE DAILY. HOLD IF SYSTOLIC BLOOD PRESSURE IS LESS THAN 110            While in the hospital, will manage blood pressure as follows; Adjust home antihypertensive regimen as follows- Holding in setting of borderline pressures in setting of new diuresis     Will utilize p.r.n. blood pressure medication only if patient's blood pressure greater than 180/110 and he develops symptoms such as worsening chest pain or shortness of breath.      VTE Risk Mitigation (From admission, onward)           Ordered     heparin (porcine) injection  5,000 Units  Every 8 hours         12/29/23 1759     IP VTE HIGH RISK PATIENT  Once         12/29/23 1759     Place sequential compression device  Until discontinued         12/29/23 1759                    Discharge Planning   MARIIA: 1/12/2024     Code Status: Full Code   Is the patient medically ready for discharge?: No    Reason for patient still in hospital (select all that apply): Treatment and Consult recommendations  Discharge Plan A: Home with family                  Deepa Holly MD  Department of Hospital Medicine   [facility] - Cardiology Stepdown

## 2024-01-09 NOTE — PLAN OF CARE
PT treatment complete, pt progressing appropriately w/ all goals  Problem: Physical Therapy  Goal: Physical Therapy Goal  Description: Goals to met by 1/21/2024    1. Supine to sit with Stand-by Assistance  2. Sit to supine with Stand-by Assistance  3. Rolling to Left and Right with Stand-by Assistance.  4. Sit to stand transfer with Stand-by Assistance  5. Bed to chair transfer with Stand-by Assistance using Rolling Walker  6. Gait  x 75 feet with Stand-by Assistance using Rolling Walker   7. Ascend/Descend 6 inch curb step with Contact Guard Assistance using Rolling Walker.  8. Stand for 5 minutes with Stand-by Assistance using Rolling Walker  9. Lower extremity exercise program x15 reps per Instruction, with assistance as needed in order to facilitate improved postural control and improvement in functional independence- MET  Update: w/ independence  Outcome: Ongoing, Progressing

## 2024-01-09 NOTE — PLAN OF CARE
Problem: Occupational Therapy  Goal: Occupational Therapy Goal  Description: Goals to be met by: 2/7/24 (1 month)      Patient will increase functional independence with ADLs by performing:    UE Dressing with Supervision.  LE Dressing with Supervision.  Grooming while standing at sink with Modified [place].  Toileting from toilet with Modified [place] for hygiene and clothing management.   Rolling to Bilateral with [place].   Supine to sit with [place].  Step transfer with Modified [place]  Toilet transfer to toilet with Modified [place].    Pt POC updated from high intensity to mod intensity at time of dc given pt's current impairments in tolerance. Continue OT as tolerated.  Carmen Jaimes OT  1/9/2024    Outcome: Ongoing, Progressing

## 2024-01-09 NOTE — SUBJECTIVE & OBJECTIVE
Interval History: Adequate response to  and lasix ggt. Denies chest pain, sob, palpitations. Net negative 2.1L over 24 hour period.    ROS  Objective:     Vital Signs (Most Recent):  Temp: 97.2 °F (36.2 °C) (01/09/24 0749)  Pulse: 86 (01/09/24 0749)  Resp: 18 (01/09/24 0749)  BP: 101/61 (01/09/24 0749)  SpO2: 96 % (01/09/24 0749) Vital Signs (24h Range):  Temp:  [97 °F (36.1 °C)-98.9 °F (37.2 °C)] 97.2 °F (36.2 °C)  Pulse:  [73-86] 86  Resp:  [18-20] 18  SpO2:  [93 %-99 %] 96 %  BP: ()/(57-75) 101/61     Weight: 108.5 kg (239 lb 3.2 oz)  Body mass index is 28.36 kg/m².     SpO2: 96 %         Intake/Output Summary (Last 24 hours) at 1/9/2024 0900  Last data filed at 1/9/2024 0436  Gross per 24 hour   Intake --   Output 2150 ml   Net -2150 ml       Lines/Drains/Airways       Drain  Duration                  Urethral Catheter 01/06/24 1300 Non-latex 16 Fr. 2 days              Peripheral Intravenous Line  Duration                  Peripheral IV - Single Lumen 01/05/24 1030 22 G Posterior;Right Hand 3 days                       Physical Exam  Neck:      Comments: JVD to mandible  Cardiovascular:      Rate and Rhythm: Normal rate and regular rhythm.      Pulses: Normal pulses.      Heart sounds: No murmur heard.  Pulmonary:      Effort: Pulmonary effort is normal. No respiratory distress.      Breath sounds: No wheezing.   Musculoskeletal:      Right lower leg: Edema present.      Left lower leg: Edema present.      Comments: +3 pitting edema   Skin:     Capillary Refill: Capillary refill takes less than 2 seconds.   Neurological:      General: No focal deficit present.      Mental Status: He is alert and oriented to person, place, and time.            Significant Labs: All pertinent lab results from the last 24 hours have been reviewed.    Significant Imaging:  Reviewed

## 2024-01-09 NOTE — ASSESSMENT & PLAN NOTE
Patient is identified as having Combined Systolic and Diastolic heart failure that is Acute on chronic. CHF is currently uncontrolled due to Pulmonary edema/pleural effusion on CXR. Latest ECHO performed and demonstrates- Results for orders placed during the hospital encounter of 11/10/23    Echo    Interpretation Summary    Left Ventricle: The left ventricle is severely dilated. Mildly increased wall thickness. There is mild concentric hypertrophy. Severe global hypokinesis present. There is severely reduced systolic function with a visually estimated ejection fraction of 10 -15%. Grade III diastolic dysfunction.    Right Ventricle: Severe right ventricular enlargement. Systolic function is severely reduced.    Mitral Valve: There is no stenosis. There is mild to moderate regurgitation with a centrally directed jet.    Tricuspid Valve: There is mild to moderate regurgitation.    Pulmonary Artery: The estimated pulmonary artery systolic pressure is 67 mmHg.  .Last BNP reviewed- and noted below   Recent Labs   Lab 01/07/24  0755   BNP 2,503*         Presented for acute on chronic SOB with associated low UOP. Afebrile HDS. BNP 3,067, Troponin 0.031. CXR with cardiomegaly, vascular congestion, and edema. Received 100 of Lasix in the ED.    - Lasix gtt increased to 20, dobutamine gtt  - Cardiology following  - RIP positive for Covid, treatment completed.  - continue home Jardiance   - Beta blocker/aldactone held given soft pressures, resume when appropriate  - Cardiac diet with Fluid restriction at 1.5L with strict I/Os and daily STANDING weights  - Check Electrolytes, keep Mag >2 & K+ >4  - SCDs, Ambulate as tolerated    - Strict I&Os, Daily standing weights  - Review Low-salt diet and enforce medication adherence on discharge

## 2024-01-09 NOTE — ASSESSMENT & PLAN NOTE
NICM EF 10-15% currently admitted for CHF exacerbation, now net negative 8L since admit. Remains volume overloaded with minimal urine output on lasix ggt 15mg/hr.    Most recent TTE 11/12 with EF 10-15%, G3DD, severely dilated left ventricle, severe right ventricular enlargement with severely reduced systolic function.    Recommendations:  - Continue  ggt 2.5 mcg/kg/min and lasix ggt 20mg/hr, monitor response.  Goal diuresis 1-2L/day  - Aggressive repletion of K, recommend scheduled K supplements while diuresing  - Can consider PRN metolazone 5mg if UO < 100cc/hr  - Fluid restriction at 1500 cc with strict I/Os and daily weights   - History of intolerance to GDMT 2/2 hypotension, continue jardiance, once euvolemic will consider re-introduction of low dose GDMT as tolerated.    - Maintain on telemetry and daily EKGs   - Up to date risk stratification : TSH, Lipids, HbA1c with optimization of risk factors is necessary  - SCDs, TEDs, Nursing communication to elevated LE   - Ambulate as tolerated

## 2024-01-09 NOTE — ASSESSMENT & PLAN NOTE
Chronic, controlled. Latest blood pressure and vitals reviewed-     Temp:  [97 °F (36.1 °C)-98.9 °F (37.2 °C)]   Pulse:  [73-86]   Resp:  [18-20]   BP: ()/(57-75)   SpO2:  [93 %-99 %] .   Home meds for hypertension were reviewed and noted below.   Hypertension Medications               furosemide (LASIX) 80 MG tablet TAKE 1 TABLET EVERY DAY    metoprolol succinate (TOPROL-XL) 50 MG 24 hr tablet TAKE 1 TABLET EVERY DAY    spironolactone (ALDACTONE) 25 MG tablet TAKE 1/2 TABLET (12.5 MG TOTAL) ONCE DAILY. HOLD IF SYSTOLIC BLOOD PRESSURE IS LESS THAN 110            While in the hospital, will manage blood pressure as follows; Adjust home antihypertensive regimen as follows- Holding in setting of borderline pressures in setting of new diuresis     Will utilize p.r.n. blood pressure medication only if patient's blood pressure greater than 180/110 and he develops symptoms such as worsening chest pain or shortness of breath.

## 2024-01-09 NOTE — PROGRESS NOTES
[facility] - [facility] Stepdown  Cardiology  Progress Note    Patient Name: Papito Bhakta  MRN: [id number]  Admission Date: 12/29/2023  Hospital Length of Stay: 7 days  Code Status: Full Code   Attending Physician: Shell Medrano*   Primary Care Physician: Brynn To MD  Expected Discharge Date: 1/12/2024  Principal Problem:Serum total bilirubin elevated    Subjective:       Interval History: Adequate response to  and lasix ggt. Denies chest pain, sob, palpitations. Net negative 2.1L over 24 hour period.    ROS  Objective:     Vital Signs (Most Recent):  Temp: 97.2 °F (36.2 °C) (01/09/24 0749)  Pulse: 86 (01/09/24 0749)  Resp: 18 (01/09/24 0749)  BP: 101/61 (01/09/24 0749)  SpO2: 96 % (01/09/24 0749) Vital Signs (24h Range):  Temp:  [97 °F (36.1 °C)-98.9 °F (37.2 °C)] 97.2 °F (36.2 °C)  Pulse:  [73-86] 86  Resp:  [18-20] 18  SpO2:  [93 %-99 %] 96 %  BP: ()/(57-75) 101/61     Weight: 108.5 kg (239 lb 3.2 oz)  Body mass index is 28.36 kg/m².     SpO2: 96 %         Intake/Output Summary (Last 24 hours) at 1/9/2024 0900  Last data filed at 1/9/2024 0436  Gross per 24 hour   Intake --   Output 2150 ml   Net -2150 ml       Lines/Drains/Airways       Drain  Duration                  Urethral Catheter 01/06/24 1300 Non-latex 16 Fr. 2 days              Peripheral Intravenous Line  Duration                  Peripheral IV - Single Lumen 01/05/24 1030 22 G Posterior;Right Hand 3 days                       Physical Exam  Neck:      Comments: JVD to mandible  Cardiovascular:      Rate and Rhythm: Normal rate and regular rhythm.      Pulses: Normal pulses.      Heart sounds: No murmur heard.  Pulmonary:      Effort: Pulmonary effort is normal. No respiratory distress.      Breath sounds: No wheezing.   Musculoskeletal:      Right lower leg: Edema present.      Left lower leg: Edema present.      Comments: +3 pitting edema   Skin:     Capillary Refill: Capillary refill takes less than 2 seconds.   Neurological:       General: No focal deficit present.      Mental Status: He is alert and oriented to person, place, and time.            Significant Labs: All pertinent lab results from the last 24 hours have been reviewed.    Significant Imaging:  Reviewed  Assessment and Plan:       NICM (nonischemic cardiomyopathy)  NICM EF 10-15% currently admitted for CHF exacerbation, now net negative 8L since admit. Remains volume overloaded with minimal urine output on lasix ggt 15mg/hr.    Most recent TTE 11/12 with EF 10-15%, G3DD, severely dilated left ventricle, severe right ventricular enlargement with severely reduced systolic function.    Recommendations:  - Continue  ggt 2.5 mcg/kg/min and lasix ggt 20mg/hr, monitor response.  Goal diuresis 1-2L/day  - Aggressive repletion of K, recommend scheduled K supplements while diuresing  - Can consider PRN metolazone 5mg if UO < 100cc/hr  - Fluid restriction at 1500 cc with strict I/Os and daily weights   - History of intolerance to GDMT 2/2 hypotension, continue jardiance, once euvolemic will consider re-introduction of low dose GDMT as tolerated.    - Maintain on telemetry and daily EKGs   - Up to date risk stratification : TSH, Lipids, HbA1c with optimization of risk factors is necessary  - SCDs, TEDs, Nursing communication to elevated LE   - Ambulate as tolerated        VTE Risk Mitigation (From admission, onward)           Ordered     heparin (porcine) injection 5,000 Units  Every 8 hours         12/29/23 1759     IP VTE HIGH RISK PATIENT  Once         12/29/23 1759     Place sequential compression device  Until discontinued         12/29/23 1759                    Montserrat Morton MD  Cardiology  [facility] Step[facility]

## 2024-01-09 NOTE — SUBJECTIVE & OBJECTIVE
Interval History: Runs of NSVT overnight. Afebrile. Soft BPs, otherwise HDS. GINNA Licea continues to uptrend. Had 2.15L UOP, continuing aggressive diuresis.    Objective:     Vital Signs (Most Recent):  Temp: 97.2 °F (36.2 °C) (01/09/24 0749)  Pulse: 86 (01/09/24 0749)  Resp: 18 (01/09/24 0749)  BP: 101/61 (01/09/24 0749)  SpO2: 96 % (01/09/24 0749) Vital Signs (24h Range):  Temp:  [97 °F (36.1 °C)-98.9 °F (37.2 °C)] 97.2 °F (36.2 °C)  Pulse:  [73-86] 86  Resp:  [18-20] 18  SpO2:  [93 %-99 %] 96 %  BP: ()/(57-75) 101/61     Weight: 108.5 kg (239 lb 3.2 oz)  Body mass index is 28.36 kg/m².    Intake/Output Summary (Last 24 hours) at 1/9/2024 0854  Last data filed at 1/9/2024 0436  Gross per 24 hour   Intake --   Output 2150 ml   Net -2150 ml         Physical Exam  Vitals and nursing note reviewed.   Constitutional:       Appearance: He is ill-appearing.   HENT:      Head: Normocephalic and atraumatic.      Mouth/Throat:      Mouth: Mucous membranes are dry.   Eyes:      General: Scleral icterus present.      Extraocular Movements: Extraocular movements intact.      Comments: Anisocoria   Neck:      Vascular: JVD (to the ear) present.   Cardiovascular:      Rate and Rhythm: Normal rate and regular rhythm.      Heart sounds:      Gallop present.   Pulmonary:      Effort: Pulmonary effort is normal. No respiratory distress.      Breath sounds: Wheezing (expiratory) present.   Abdominal:      General: Abdomen is flat. There is no distension.      Palpations: Abdomen is soft.      Tenderness: There is no abdominal tenderness.   Musculoskeletal:         General: Tenderness (b/l LE) present.      Right lower leg: Edema present.      Left lower leg: Edema present.   Skin:     General: Skin is warm and dry.   Neurological:      General: No focal deficit present.      Mental Status: He is alert and oriented to person, place, and time.      Motor: Weakness present.   Psychiatric:         Mood and Affect: Mood normal.          Behavior: Behavior normal.             Significant Labs: All pertinent labs within the past 24 hours have been reviewed.    Significant Imaging: I have reviewed all pertinent imaging results/findings within the past 24 hours.

## 2024-01-09 NOTE — PLAN OF CARE
Cardiology Update:    NICM EF 10-15% currently admitted for CHF exacerbation, now net negative 8L since admit. Remains volume overloaded with minimal urine output on lasix ggt 15mg/hr.     Most recent TTE 11/12 with EF 10-15%, G3DD, severely dilated left ventricle, severe right ventricular enlargement with severely reduced systolic function.     Recommendations:  - Continue  ggt 2.5 mcg/kg/min and lasix ggt 20mg/hr, monitor response.  Goal diuresis 1-2L/day  - Aggressive repletion of K, recommend scheduled K supplements while diuresing  - Can consider PRN metolazone 5mg if UO < 100cc/hr  - Fluid restriction at 1500 cc with strict I/Os and daily weights   - History of intolerance to GDMT 2/2 hypotension, continue jardiance, once euvolemic will consider re-introduction of low dose GDMT as tolerated.    - Maintain on telemetry and daily EKGs   - Up to date risk stratification : TSH, Lipids, HbA1c with optimization of risk factors is necessary  - SCDs, TEDs, Nursing communication to elevated LE   - Ambulate as tolerated    Discussed with cardiology staff.    Montserrat Morton, PGY2  Internal Medicine Resident   [facility]

## 2024-01-09 NOTE — ASSESSMENT & PLAN NOTE
68 year old M with significant cardiac hx, CHF with EF 10-15% with ICD. Hepatology consulted for increasing T bili since admission. Direct hyperbilirubinemia with no signs of hemolysis. Hyperbilirubinemia noted in November 2023 also when patient was admitted for CHF exacerbation. Liver US done showing: Bidirectional portal vein flow may be seen with right heart failure, tricuspid regurgitation, or portal hypertension. Evidence of volume overload with distended IVC and hepatic veins.  Pulsatile flow through the hepatic veins (also possibly suggesting tricuspid regurgitation). Suspected hepatic steatosis. No evidence of biliary obstruction. Recent hepatitis panel negative. Elevated Tbili likely 2/2 congestive hepatopathy considering patient diuresis appeared to stall leading to patient requiring dobutamine gtt along with Lasix.    Recommendations:  - Continue diuresis per cardiology recommendations and monitor Tbili   - Will obtain serologic studies to assess for autoimmune hepatitis  - Do not recommend liver biopsy at this time unless it is planned for patient to get LVAD or heart transplant  - Rest of care per primary team

## 2024-01-09 NOTE — ASSESSMENT & PLAN NOTE
Creatine stable for now. BMP reviewed- noted Estimated Creatinine Clearance: 74.5 mL/min (based on SCr of 1.3 mg/dL). according to latest data. Based on current GFR, CKD stage is stage 3 - GFR 30-59.  Monitor UOP and serial BMP and adjust therapy as needed. Renally dose meds. Avoid nephrotoxic medications and procedures.

## 2024-01-09 NOTE — ASSESSMENT & PLAN NOTE
Creatinine 2.1 on admit, baseline around 1.4. Likely prerenal etiology given urine sodium and FENa vs progression of CKD. Improving with increased diuresis     Recent Labs     01/07/24  0755 01/08/24  0430 01/09/24  0509   BUN 56* 51* 49*   CREATININE 1.5* 1.4 1.3         Estimated Creatinine Clearance: 74.5 mL/min (based on SCr of 1.3 mg/dL).  Improving    - Renal US: renal cysts, no hydronephrosis  - Urine Na: 14, Pr/Cr: 0.72; FENa 0.2%  - Monitor urine output and serial BMP and adjust therapy as needed.   - Strict I&Os and daily weights   - Avoid nephrotoxic agents such as NSAIDs, gadolinium and IV radiocontrast.  - Renally dose meds to current GFR.  - Maintain MAP > 65.  - Nephrology referral outpatient

## 2024-01-10 PROBLEM — Z71.89 ACP (ADVANCE CARE PLANNING): Status: ACTIVE | Noted: 2024-01-10

## 2024-01-10 PROBLEM — Z51.5 ENCOUNTER FOR PALLIATIVE CARE: Status: ACTIVE | Noted: 2024-01-10

## 2024-01-10 LAB
A1AT SERPL-MCNC: 209 MG/DL (ref 100–190)
ALBUMIN SERPL BCP-MCNC: 2.3 G/DL (ref 3.5–5.2)
ALP SERPL-CCNC: 105 U/L (ref 55–135)
ALT SERPL W/O P-5'-P-CCNC: 30 U/L (ref 10–44)
ANA SER QL IF: NORMAL
ANION GAP SERPL CALC-SCNC: 11 MMOL/L (ref 8–16)
ANION GAP SERPL CALC-SCNC: 9 MMOL/L (ref 8–16)
AST SERPL-CCNC: 35 U/L (ref 10–40)
BASOPHILS # BLD AUTO: 0.01 K/UL (ref 0–0.2)
BASOPHILS NFR BLD: 0.1 % (ref 0–1.9)
BILIRUB SERPL-MCNC: 10.3 MG/DL (ref 0.1–1)
BUN SERPL-MCNC: 40 MG/DL (ref 8–23)
BUN SERPL-MCNC: 42 MG/DL (ref 8–23)
CALCIUM SERPL-MCNC: 9.1 MG/DL (ref 8.7–10.5)
CALCIUM SERPL-MCNC: 9.1 MG/DL (ref 8.7–10.5)
CERULOPLASMIN SERPL-MCNC: 43 MG/DL (ref 15–45)
CHLORIDE SERPL-SCNC: 93 MMOL/L (ref 95–110)
CHLORIDE SERPL-SCNC: 95 MMOL/L (ref 95–110)
CO2 SERPL-SCNC: 29 MMOL/L (ref 23–29)
CO2 SERPL-SCNC: 33 MMOL/L (ref 23–29)
CREAT SERPL-MCNC: 1.3 MG/DL (ref 0.5–1.4)
CREAT SERPL-MCNC: 1.3 MG/DL (ref 0.5–1.4)
DIFFERENTIAL METHOD BLD: ABNORMAL
EOSINOPHIL # BLD AUTO: 0 K/UL (ref 0–0.5)
EOSINOPHIL NFR BLD: 0 % (ref 0–8)
ERYTHROCYTE [DISTWIDTH] IN BLOOD BY AUTOMATED COUNT: 22.9 % (ref 11.5–14.5)
EST. GFR  (NO RACE VARIABLE): 59.8 ML/MIN/1.73 M^2
EST. GFR  (NO RACE VARIABLE): 59.8 ML/MIN/1.73 M^2
GLUCOSE SERPL-MCNC: 138 MG/DL (ref 70–110)
GLUCOSE SERPL-MCNC: 99 MG/DL (ref 70–110)
HCT VFR BLD AUTO: 37.4 % (ref 40–54)
HGB BLD-MCNC: 12.6 G/DL (ref 14–18)
IMM GRANULOCYTES # BLD AUTO: 0.04 K/UL (ref 0–0.04)
IMM GRANULOCYTES NFR BLD AUTO: 0.4 % (ref 0–0.5)
LYMPHOCYTES # BLD AUTO: 0.8 K/UL (ref 1–4.8)
LYMPHOCYTES NFR BLD: 7.8 % (ref 18–48)
MAGNESIUM SERPL-MCNC: 2.2 MG/DL (ref 1.6–2.6)
MCH RBC QN AUTO: 23.3 PG (ref 27–31)
MCHC RBC AUTO-ENTMCNC: 33.7 G/DL (ref 32–36)
MCV RBC AUTO: 69 FL (ref 82–98)
MITOCHONDRIA AB TITR SER IF: NORMAL {TITER}
MONOCYTES # BLD AUTO: 0.9 K/UL (ref 0.3–1)
MONOCYTES NFR BLD: 9.7 % (ref 4–15)
NEUTROPHILS # BLD AUTO: 7.9 K/UL (ref 1.8–7.7)
NEUTROPHILS NFR BLD: 82 % (ref 38–73)
NRBC BLD-RTO: 0 /100 WBC
PHOSPHATE SERPL-MCNC: 3 MG/DL (ref 2.7–4.5)
PLATELET # BLD AUTO: 108 K/UL (ref 150–450)
PMV BLD AUTO: ABNORMAL FL (ref 9.2–12.9)
POTASSIUM SERPL-SCNC: 3.7 MMOL/L (ref 3.5–5.1)
POTASSIUM SERPL-SCNC: 3.7 MMOL/L (ref 3.5–5.1)
PROT SERPL-MCNC: 6.4 G/DL (ref 6–8.4)
RBC # BLD AUTO: 5.41 M/UL (ref 4.6–6.2)
SODIUM SERPL-SCNC: 135 MMOL/L (ref 136–145)
SODIUM SERPL-SCNC: 135 MMOL/L (ref 136–145)
WBC # BLD AUTO: 9.66 K/UL (ref 3.9–12.7)

## 2024-01-10 PROCEDURE — 20600001 HC STEP DOWN PRIVATE ROOM

## 2024-01-10 PROCEDURE — 80053 COMPREHEN METABOLIC PANEL: CPT

## 2024-01-10 PROCEDURE — 25000242 PHARM REV CODE 250 ALT 637 W/ HCPCS

## 2024-01-10 PROCEDURE — 99497 ADVNCD CARE PLAN 30 MIN: CPT | Mod: 25,,, | Performed by: STUDENT IN AN ORGANIZED HEALTH CARE EDUCATION/TRAINING PROGRAM

## 2024-01-10 PROCEDURE — 85025 COMPLETE CBC W/AUTO DIFF WBC: CPT

## 2024-01-10 PROCEDURE — 84100 ASSAY OF PHOSPHORUS: CPT

## 2024-01-10 PROCEDURE — 25000003 PHARM REV CODE 250: Performed by: STUDENT IN AN ORGANIZED HEALTH CARE EDUCATION/TRAINING PROGRAM

## 2024-01-10 PROCEDURE — 27000207 HC ISOLATION

## 2024-01-10 PROCEDURE — 63600175 PHARM REV CODE 636 W HCPCS: Performed by: STUDENT IN AN ORGANIZED HEALTH CARE EDUCATION/TRAINING PROGRAM

## 2024-01-10 PROCEDURE — 99223 1ST HOSP IP/OBS HIGH 75: CPT | Mod: ,,, | Performed by: STUDENT IN AN ORGANIZED HEALTH CARE EDUCATION/TRAINING PROGRAM

## 2024-01-10 PROCEDURE — 63600175 PHARM REV CODE 636 W HCPCS

## 2024-01-10 PROCEDURE — 25000003 PHARM REV CODE 250

## 2024-01-10 PROCEDURE — 82390 ASSAY OF CERULOPLASMIN: CPT | Performed by: STUDENT IN AN ORGANIZED HEALTH CARE EDUCATION/TRAINING PROGRAM

## 2024-01-10 PROCEDURE — 83735 ASSAY OF MAGNESIUM: CPT

## 2024-01-10 PROCEDURE — 82103 ALPHA-1-ANTITRYPSIN TOTAL: CPT | Performed by: STUDENT IN AN ORGANIZED HEALTH CARE EDUCATION/TRAINING PROGRAM

## 2024-01-10 PROCEDURE — 80048 BASIC METABOLIC PNL TOTAL CA: CPT | Mod: XB

## 2024-01-10 RX ORDER — SPIRONOLACTONE 25 MG/1
25 TABLET ORAL DAILY
Status: DISCONTINUED | OUTPATIENT
Start: 2024-01-10 | End: 2024-01-16

## 2024-01-10 RX ORDER — DOBUTAMINE HYDROCHLORIDE 400 MG/100ML
2.5 INJECTION INTRAVENOUS CONTINUOUS
Qty: 250 ML | Refills: 3 | Status: SHIPPED | OUTPATIENT
Start: 2024-01-10

## 2024-01-10 RX ORDER — GUAIFENESIN 100 MG/5ML
200 SOLUTION ORAL EVERY 4 HOURS PRN
Qty: 180 ML | Refills: 0 | Status: SHIPPED | OUTPATIENT
Start: 2024-01-10 | End: 2024-01-21

## 2024-01-10 RX ORDER — METOLAZONE 5 MG/1
5 TABLET ORAL DAILY PRN
Qty: 30 TABLET | Refills: 11 | Status: SHIPPED | OUTPATIENT
Start: 2024-01-10 | End: 2025-01-09

## 2024-01-10 RX ORDER — BUMETANIDE 2 MG/1
2 TABLET ORAL 2 TIMES DAILY
Qty: 60 TABLET | Refills: 11 | Status: SHIPPED | OUTPATIENT
Start: 2024-01-10 | End: 2025-01-09

## 2024-01-10 RX ORDER — AMOXICILLIN 250 MG
1 CAPSULE ORAL DAILY PRN
Qty: 30 TABLET | Refills: 3 | Status: SHIPPED | OUTPATIENT
Start: 2024-01-10

## 2024-01-10 RX ORDER — SPIRONOLACTONE 25 MG/1
25 TABLET ORAL DAILY
Qty: 45 TABLET | Refills: 5
Start: 2024-01-10 | End: 2024-01-18

## 2024-01-10 RX ADMIN — DEXAMETHASONE 1 MG: 0.5 SOLUTION ORAL at 12:01

## 2024-01-10 RX ADMIN — AMIODARONE HYDROCHLORIDE 200 MG: 200 TABLET ORAL at 09:01

## 2024-01-10 RX ADMIN — ASPIRIN 325 MG: 325 TABLET, COATED ORAL at 09:01

## 2024-01-10 RX ADMIN — ALUMINUM HYDROXIDE, MAGNESIUM HYDROXIDE, AND DIMETHICONE 10 ML: 400; 400; 40 SUSPENSION ORAL at 09:01

## 2024-01-10 RX ADMIN — FUROSEMIDE 20 MG/HR: 10 INJECTION, SOLUTION INTRAMUSCULAR; INTRAVENOUS at 09:01

## 2024-01-10 RX ADMIN — SENNOSIDES AND DOCUSATE SODIUM 1 TABLET: 8.6; 5 TABLET ORAL at 09:01

## 2024-01-10 RX ADMIN — SPIRONOLACTONE 25 MG: 25 TABLET ORAL at 11:01

## 2024-01-10 RX ADMIN — PRAVASTATIN SODIUM 80 MG: 40 TABLET ORAL at 09:01

## 2024-01-10 RX ADMIN — POTASSIUM BICARBONATE 25 MEQ: 978 TABLET, EFFERVESCENT ORAL at 09:01

## 2024-01-10 RX ADMIN — ALUMINUM HYDROXIDE, MAGNESIUM HYDROXIDE, AND DIMETHICONE 10 ML: 400; 400; 40 SUSPENSION ORAL at 05:01

## 2024-01-10 RX ADMIN — HEPARIN SODIUM 5000 UNITS: 5000 INJECTION INTRAVENOUS; SUBCUTANEOUS at 09:01

## 2024-01-10 RX ADMIN — EMPAGLIFLOZIN 10 MG: 10 TABLET, FILM COATED ORAL at 09:01

## 2024-01-10 NOTE — ASSESSMENT & PLAN NOTE
Creatine stable for now. BMP reviewed- noted Estimated Creatinine Clearance: 69.2 mL/min (based on SCr of 1.4 mg/dL). according to latest data. Based on current GFR, CKD stage is stage 3 - GFR 30-59.  Monitor UOP and serial BMP and adjust therapy as needed. Renally dose meds. Avoid nephrotoxic medications and procedures.

## 2024-01-10 NOTE — ASSESSMENT & PLAN NOTE
"Papito Bhakta is a 68-year-old man with a history of end stage NICM (EF 10-15%) s/p ICD, NYHA class IV who was admitted for severe heart failure exacerbation, solid-food dysphagia and found to be incidentally COVID positive. During this admission, he was started on long-term dobutamine. Palliative and Supportive Care was consulted to explore goals of care.    Advance Care Planning   Goals of Care  - Code status: updated to DNAR/DNI, ok to reverse during planned procedures  - Next of kin: two adult children, Annie (daughter) and Papito Draper (son)   - Lives with daughter Annie  - ACP documents: has [place], but will need updating to reflect DNR code status  - Patient has decision making capacity  - Prognosis: poor, NYHA class IV for end-stage heart failure  - Goals: spend more time at home, improvement in condition, would want a peaceful end of life  - Plans: agreed to continue current plan of care (dysphagia work up, continued diuresis). Would benefit from transparent expectations of what work-up would mean but also the realistic potential to find a curative solution for solid-food dysphagia    Goals of Care Conversation:  - 1/10/24: I met Mr. Bhakta in his room and he was talking on his cell phone with his daughter Annie. Annie was telling him to reconsider and to stay in the hospital in hopes to get better. He said, "Ok, I was thinking only about myself, I'll stay." He finally did hang up the phone and he was open to our encounter. 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 He nodded appreciatively, was appropriately tearful as he lamented that he wished he had done things "better" in the past. He shares that while he has been in the hospital, he feels like he is only getting worse. He also acknowledges that so many of the " "things that bother him are little in the hospital (like a non-functional catheter, uncomfortable hospital bed that deflates repeatedly, etc) but that "the little things add up" and he would rather be home. He is appropriately tearful as we talked about his end-stage heart failure, noting that he is on dobutamine which is a medicine that is artificially pumping his heart because his heart is too weak to do this on its own. I shared that dobutamine is by no means perfect, and will one day stop working in the future. We did talk about code status and I recommended that when the dobutamine stops working and his heart stops, that we allow him a peaceful, natural death and protect him from chest compressions/CPR which will not work. He was surprised by this recommendation when I told him that this is a DNR code status. I acknowledged that in the past, he was full code and it was appropriate because his heart was stronger. Now we are in a different place, and know that if even the current life saving measures he's on right now (dobutamine) stops working, it is a reflection that his heart is dying and that nothing more will actually help him. I shared that by performing CPR, we will knowingly inflict more pain/suffering without meaningful benefit. Acknowledged that he is a sukhdev human being, and it is our job to be thoughtful about how we help one another and also protect one another from things that will be harmful. I shared that often in the procedure room, DNR code statuses are reversed to full code, and will return back to DNR when he is done with the planned procedure, as the doctors would never send him to a procedure knowing he wouldn't survive. He expressed understanding and verbalized agreement to update his code status to DNR.  - He shared that his friend and his daughter will come later - he thinks they will talk about convincing him to stay a little longer. I shared that this is OK as it is apparent that he " makes decisions about his life, thinking about the people he loves. He nods, shared that Annie's mother had  in  from Lupus complications, and this painful experience is carried by their family. He tried to tell Annie that he wouldn't be living forever, but acknowledges that this is a difficult thing to accept. He says he would proceed with the doctor's recommendations. I shared with him that I agreed with him, that it is apparent in the last two weeks, nothing has truly helped him get better. Acknowledged that he came to the hospital with the hope to be able to walk away doing better or feeling better, but that doesn't mean as human beings that we can guarantee that as we are not God. He agreed. I shared my concerns that he will not get better, and during his last chapter, what would he want? He tells me that he wants to be at home. I reminded him that at any point he is realizing that he is wasting his time in the hospital when time is short, he can always tell his doctors that he'd like to focus his sukhdev time with the people he loves and in the comforts of home. He nodded in understanding, stating that he will take this one day at a time.

## 2024-01-10 NOTE — PLAN OF CARE
Pt decided he wanted to go home on palliative care but family talked him in to staying.  Plan of care reviewed with patient. Call light within reach, fall precautions maintained bed alarm set. Patient made aware. Nurse instructed patient to call for assistance. Patient verbalized understanding. Telemetry monitor throughout shift. NAD noted. Will continue to monitor and continue plan of care.       Problem: Adult Inpatient Plan of Care  Goal: Plan of Care Review  Outcome: Ongoing, Progressing  Goal: Patient-Specific Goal (Individualized)  Outcome: Ongoing, Progressing  Goal: Absence of Hospital-Acquired Illness or Injury  Outcome: Ongoing, Progressing  Goal: Optimal Comfort and Wellbeing  Outcome: Ongoing, Progressing  Goal: Readiness for Transition of Care  Outcome: Ongoing, Progressing     Problem: Fluid and Electrolyte Imbalance (Acute Kidney Injury/Impairment)  Goal: Fluid and Electrolyte Balance  Outcome: Ongoing, Progressing     Problem: Oral Intake Inadequate (Acute Kidney Injury/Impairment)  Goal: Optimal Nutrition Intake  Outcome: Ongoing, Progressing     Problem: Renal Function Impairment (Acute Kidney Injury/Impairment)  Goal: Effective Renal Function  Outcome: Ongoing, Progressing     Problem: Impaired Wound Healing  Goal: Optimal Wound Healing  Outcome: Ongoing, Progressing     Problem: Adjustment to Illness (Heart Failure)  Goal: Optimal Coping  Outcome: Ongoing, Progressing     Problem: Cardiac Output Decreased (Heart Failure)  Goal: Optimal Cardiac Output  Outcome: Ongoing, Progressing     Problem: Dysrhythmia (Heart Failure)  Goal: Stable Heart Rate and Rhythm  Outcome: Ongoing, Progressing     Problem: Fluid Imbalance (Heart Failure)  Goal: Fluid Balance  Outcome: Ongoing, Progressing     Problem: Functional Ability Impaired (Heart Failure)  Goal: Optimal Functional Ability  Outcome: Ongoing, Progressing     Problem: Oral Intake Inadequate (Heart Failure)  Goal: Optimal Nutrition Intake  Outcome:  Ongoing, Progressing     Problem: Respiratory Compromise (Heart Failure)  Goal: Effective Oxygenation and Ventilation  Outcome: Ongoing, Progressing     Problem: Sleep Disordered Breathing (Heart Failure)  Goal: Effective Breathing Pattern During Sleep  Outcome: Ongoing, Progressing     Problem: Skin Injury Risk Increased  Goal: Skin Health and Integrity  Outcome: Ongoing, Progressing     Problem: Infection  Goal: Absence of Infection Signs and Symptoms  Outcome: Ongoing, Progressing     Problem: Coping Ineffective  Goal: Effective Coping  Outcome: Ongoing, Progressing

## 2024-01-10 NOTE — PLAN OF CARE
Cardiology Update:     Adequate diuresis with current regimen, net negative 1.6L over past 24 hours. Informed by medicine team patient is interested in going home with hospice today.      Recommendations:  - Can discharge with palliative  ggt 2.5 mcg/kg/min   - Bumex 2mg BID  - Spironolactone 25mg  - Continue Jardiance  - PRN Metolazone 5mg    If remains inpatient, can continue  2.5mcg/kg/min and Lasix 20mg/hr with addition of spironolactone 25mg.      Discussed with cardiology staff. Cardiology will sign off at this time.     Montserrat Morton, PGY2  Internal Medicine Resident  [facility]

## 2024-01-10 NOTE — SUBJECTIVE & OBJECTIVE
Interval History: Met with Mr. Bhakta, emotional conversation held at bedside.    Past Medical History:   Diagnosis Date    RIGOBERTO (acute kidney injury) 10/7/2020    Anticoagulant long-term use     Aspirin    CHF (congestive heart failure)     Hyperlipidemia     Hypertension     Microcytic anemia 12/31/2023    NICM (nonischemic cardiomyopathy) 6/16/2020    Obesity     AMELIA (obstructive sleep apnea) 1/7/2022    Peripheral vascular disease, unspecified 2/1/2021       Past Surgical History:   Procedure Laterality Date    ESOPHAGOGASTRODUODENOSCOPY N/A 1/3/2024    Procedure: EGD (ESOPHAGOGASTRODUODENOSCOPY);  Surgeon: Vaughn Oliver MD;  Location: [facility] ENDO [address]);  Service: Endoscopy;  Laterality: N/A;    ESOPHAGOGASTRODUODENOSCOPY N/A 1/5/2024    Procedure: EGD (ESOPHAGOGASTRODUODENOSCOPY);  Surgeon: Faith Juares MD;  Location: [facility] ENDO [address]);  Service: Endoscopy;  Laterality: N/A;    INSERTION OF BIVENTRICULAR IMPLANTABLE CARDIOVERTER-DEFIBRILLATOR (ICD) N/A 02/13/2019    Procedure: INSERTION, ICD, BIVENTRICULAR;  Surgeon: Shailesh Eng MD;  Location: [facility] CATH LAB;  Service: Cardiology;  Laterality: N/A;  RN PRE OP 2-6-19  1ST CASE PER  RADHA. NOTIFIED RADHA THAT ANESTHESIA IS NOT PITO FOR 1ST CASE START-LO    INSERTION OF BIVENTRICULAR IMPLANTABLE CARDIOVERTER-DEFIBRILLATOR (ICD) N/A 09/28/2020    Procedure: INSERTION, ICD, BIVENTRICULAR;  Surgeon: Jim Kwong MD;  Location: [facility] EP LAB;  Service: Cardiology;  Laterality: N/A;  NICM, CRT-D, SJM,, MAC, DM, 3 Prep*Wearing LifeVest*    oral extraction  11/2018    TESTICLE SURGERY         Review of patient's allergies indicates:   Allergen Reactions    Ramipril      Leg swelling and gynecomastia     Losartan Rash       Medications:  Continuous Infusions:   DOBUTamine IV infusion (non-titrating) 2.5 mcg/kg/min (01/10/24 9822)    furosemide (LASIX) 500 mg in 50 mL infusion (conc: 10 mg/mL) Stopped (01/10/24 6583)     Scheduled Meds:   amiodarone  200  mg Oral Daily    aspirin  325 mg Oral Daily    dexAMETHasone  1 mg Oral QID    empagliflozin  10 mg Oral Daily    heparin (porcine)  5,000 Units Subcutaneous Q8H    potassium bicarbonate  25 mEq Oral BID    pravastatin  80 mg Oral Daily    senna-docusate 8.6-50 mg  1 tablet Oral Daily     PRN Meds:acetaminophen, dextrose 10%, dextrose 10%, glucagon (human recombinant), glucose, glucose, guaiFENesin 100 mg/5 ml, naloxone, sodium chloride 0.9%    Family History       Problem Relation (Age of Onset)    Epilepsy Mother          Tobacco Use    Smoking status: Former     Current packs/day: 0.00     Average packs/day: 0.5 packs/day for 40.0 years (20.0 ttl pk-yrs)     Types: Cigarettes, Cigars     Start date: 1974     Quit date: 2014     Years since quitting: 10.0     Passive exposure: Current    Smokeless tobacco: Never   Substance and Sexual Activity    Alcohol use: Yes     Comment: once a month beer or liquor    Drug use: No    Sexual activity: Not Currently     Birth control/protection: None     Comment: uses protection sometimes       Review of Systems   Constitutional:  Positive for activity change and fatigue.   HENT:  Positive for trouble swallowing.    Cardiovascular:  Positive for leg swelling.   Neurological:  Positive for weakness.     Objective:     Vital Signs (Most Recent):  Temp: 98.5 °F (36.9 °C) (01/10/24 0403)  Pulse: 82 (01/10/24 0900)  Resp: 18 (01/10/24 0900)  BP: 99/67 (01/10/24 0900)  SpO2: 98 % (01/10/24 0900) Vital Signs (24h Range):  Temp:  [98 °F (36.7 °C)-98.5 °F (36.9 °C)] 98.5 °F (36.9 °C)  Pulse:  [79-88] 82  Resp:  [16-18] 18  SpO2:  [95 %-98 %] 98 %  BP: ()/(62-70) 99/67     Weight:  (refusing care)  Body mass index is 28.36 kg/m².       Physical Exam  HENT:      Head: Normocephalic and atraumatic.      Right Ear: External ear normal.      Left Ear: External ear normal.      Nose: Nose normal.      Mouth/Throat:      Mouth: Mucous membranes are dry.   Eyes:      General: Scleral  icterus present.      Extraocular Movements: Extraocular movements intact.   Cardiovascular:      Rate and Rhythm: Normal rate.   Pulmonary:      Effort: Pulmonary effort is normal.      Breath sounds: Normal breath sounds.   Abdominal:      General: Bowel sounds are normal.      Palpations: Abdomen is soft.   Musculoskeletal:         General: Swelling present.   Lymphadenopathy:      Cervical: No cervical adenopathy.   Skin:     General: Skin is warm and dry.   Neurological:      Mental Status: He is alert and oriented to person, place, and time. Mental status is at baseline.   Psychiatric:         Mood and Affect: Mood normal.         Behavior: Behavior normal.            Review of Symptoms      Symptom Assessment (ESAS 0-10 Scale)  Pain:  0  Dyspnea:  0  Anxiety:  0  Nausea:  0  Depression:  0  Anorexia:  0  Fatigue:  0  Insomnia:  0  Restlessness:  0  Agitation:  0         Psychosocial/Cultural:   See Palliative Psychosocial Note: No  Lives with daughter Annie. , wife  from [place] . Has son named Papito Draper. Also lives with 9-year-old grandson, and has other grandchildren and great-grandchild.  **Primary  to Follow**  Palliative Care  Consult: No    Spiritual:  F - [place] and Belief:  Jewish  I - Importance:  Important  C - Community:  N/A  A - Address in Care:  Engage spiritual care        Advance Care Planning   Advance Directives:   Living Will: Yes    [place]POST: Yes    Do Not Resuscitate Status: Yes    Medical Power of : No      Decision Making:  Patient answered questions  Goals of Care: The patient endorses that what is most important right now is to focus on spending time at home and improvement in condition but with limits to invasive therapies    Accordingly, we have decided that the best plan to meet the patient's goals includes continuing with treatment         Significant Labs: CBC:   Recent Labs   Lab 24  0509   WBC 8.08   HGB 12.7*   HCT 36.9*    PLT 89*     CMP:   Recent Labs   Lab 01/09/24  0509 01/09/24  1601   * 137   K 3.2* 3.8   CL 96 97   CO2 28 29   GLU 94 105   BUN 49* 46*   CREATININE 1.3 1.4   CALCIUM 8.7 8.8   PROT 5.9*  --    ALBUMIN 2.1*  --    BILITOT 9.8*  --    ALKPHOS 97  --    AST 32  --    ALT 30  --    ANIONGAP 11 11     CBC:   Recent Labs   Lab 01/09/24  0509   WBC 8.08   HGB 12.7*   HCT 36.9*   MCV 69*   PLT 89*     BMP:  Recent Labs   Lab 01/09/24  1601         K 3.8   CL 97   CO2 29   BUN 46*   CREATININE 1.4   CALCIUM 8.8     LFT:  Lab Results   Component Value Date    AST 32 01/09/2024    GGT 55 01/06/2024    ALKPHOS 97 01/09/2024    BILITOT 9.8 (H) 01/09/2024     Albumin:   Albumin   Date Value Ref Range Status   01/09/2024 2.1 (L) 3.5 - 5.2 g/dL Final     Protein:   Total Protein   Date Value Ref Range Status   01/09/2024 5.9 (L) 6.0 - 8.4 g/dL Final     Lactic acid:   Lab Results   Component Value Date    LACTATE 2.1 01/07/2024    LACTATE 2.3 (H) 01/01/2024       Significant Imaging: I have reviewed all pertinent imaging results/findings within the past 24 hours.

## 2024-01-10 NOTE — ASSESSMENT & PLAN NOTE
Creatinine 2.1 on admit, baseline around 1.4. Likely prerenal etiology given urine sodium and FENa vs progression of CKD. Improving with increased diuresis     Recent Labs     01/08/24  0430 01/09/24  0509 01/09/24  1601   BUN 51* 49* 46*   CREATININE 1.4 1.3 1.4         Estimated Creatinine Clearance: 69.2 mL/min (based on SCr of 1.4 mg/dL).  RESOLVED    - Renal US: renal cysts, no hydronephrosis  - Urine Na: 14, Pr/Cr: 0.72; FENa 0.2%  - Monitor urine output and serial BMP and adjust therapy as needed.   - Strict I&Os and daily weights   - Avoid nephrotoxic agents such as NSAIDs, gadolinium and IV radiocontrast.  - Renally dose meds to current GFR.  - Maintain MAP > 65.  - Nephrology referral outpatient

## 2024-01-10 NOTE — HPI
Papito Bhakta is a 68-year-old man with a history of end stage NICM (EF 10-15%) s/p ICD, NYHA class IV who was admitted for severe heart failure exacerbation, solid-food dysphagia and found to be incidentally COVID positive. Palliative and Supportive Care was consulted to explore goals of care.

## 2024-01-10 NOTE — ASSESSMENT & PLAN NOTE
"1/10, patient stating desire to go home. He is tired of his prolonged hospital course and feeling defeated at the slow trajectory of his remaining course. States he wants to be comfortable at home, "if it's my time it's my time". We discussed benefits of staying, include improved quality of life if we are able to medically optimize  Palliative care consulted, greatly appreciate assistance. With this conversation, along with conversations with family, he has agreed to stay for now. Will continue to engage in GOC conversations.     DNR order placed per palliative  Need to assess utility of ICD    "

## 2024-01-10 NOTE — ASSESSMENT & PLAN NOTE
Stable on admission, no acute issues, he denies discharge. QTc chronically prolonged. Continue home amiodarone for NSVT prevention. Monitor on telemetry     - 12/31, noted to have AF RVR with hypotension. Started on amio gtt. Per report, pacemaker malfunctioning.  - Restarted on home oral amio  - Device interrogated, no complications. EP has signed off  - Consider deactivating in light of new DNR status, will discuss with patient

## 2024-01-10 NOTE — ASSESSMENT & PLAN NOTE
Chronic, controlled. Latest blood pressure and vitals reviewed-     Temp:  [98.1 °F (36.7 °C)-98.5 °F (36.9 °C)]   Pulse:  [79-88]   Resp:  [16-18]   BP: ()/(62-70)   SpO2:  [95 %-98 %] .   Home meds for hypertension were reviewed and noted below.   Hypertension Medications               furosemide (LASIX) 80 MG tablet TAKE 1 TABLET EVERY DAY    metoprolol succinate (TOPROL-XL) 50 MG 24 hr tablet TAKE 1 TABLET EVERY DAY    spironolactone (ALDACTONE) 25 MG tablet TAKE 1/2 TABLET (12.5 MG TOTAL) ONCE DAILY. HOLD IF SYSTOLIC BLOOD PRESSURE IS LESS THAN 110            While in the hospital, will manage blood pressure as follows; Adjust home antihypertensive regimen as follows- Holding in setting of borderline pressures in setting of new diuresis     Will utilize p.r.n. blood pressure medication only if patient's blood pressure greater than 180/110 and he develops symptoms such as worsening chest pain or shortness of breath.

## 2024-01-10 NOTE — SUBJECTIVE & OBJECTIVE
"Interval History: HDS on RA, UOP appropriate. Expressing desire to stop further treatment and to discharge to home. States he wishes to live comfortably at home and "if it's my time it's my time". Palliative care consulted for possible hospice discussion, however he is agreeing to stay and proceed with treatment at this time.     Review of Systems  Objective:     Vital Signs (Most Recent):  Temp: 98.1 °F (36.7 °C) (01/10/24 1143)  Pulse: 88 (01/10/24 1143)  Resp: 17 (01/10/24 1143)  BP: 98/64 (01/10/24 1143)  SpO2: 97 % (01/10/24 1143) Vital Signs (24h Range):  Temp:  [98.1 °F (36.7 °C)-98.5 °F (36.9 °C)] 98.1 °F (36.7 °C)  Pulse:  [79-88] 88  Resp:  [16-18] 17  SpO2:  [95 %-98 %] 97 %  BP: ()/(62-70) 98/64     Weight:  (refusing care)  Body mass index is 28.36 kg/m².    Intake/Output Summary (Last 24 hours) at 1/10/2024 1327  Last data filed at 1/10/2024 0830  Gross per 24 hour   Intake 1097.78 ml   Output 3500 ml   Net -2402.22 ml         Physical Exam  Vitals and nursing note reviewed.   Constitutional:       Appearance: He is ill-appearing.   HENT:      Head: Normocephalic and atraumatic.      Mouth/Throat:      Mouth: Mucous membranes are dry.   Eyes:      General: Scleral icterus present.      Extraocular Movements: Extraocular movements intact.      Comments: Anisocoria   Neck:      Vascular: JVD (to the ear) present.   Cardiovascular:      Rate and Rhythm: Normal rate and regular rhythm.      Heart sounds:      Gallop present.   Pulmonary:      Effort: Pulmonary effort is normal. No respiratory distress.      Breath sounds: Wheezing (expiratory) present.   Abdominal:      General: Abdomen is flat. There is no distension.      Palpations: Abdomen is soft.      Tenderness: There is no abdominal tenderness.   Musculoskeletal:         General: Tenderness (b/l LE) present.      Right lower leg: Edema present.      Left lower leg: Edema present.   Skin:     General: Skin is warm and dry.   Neurological:      " General: No focal deficit present.      Mental Status: He is alert and oriented to person, place, and time.      Motor: Weakness present.   Psychiatric:         Mood and Affect: Mood normal.         Behavior: Behavior normal.             Significant Labs: All pertinent labs within the past 24 hours have been reviewed.    Significant Imaging: I have reviewed all pertinent imaging results/findings within the past 24 hours.

## 2024-01-10 NOTE — CONSULTS
[facility] - Cardiology Stepdown  Palliative Medicine  Consult Note    Patient Name: Papito Bhakta  MRN: [id number]  Admission Date: 12/29/2023  Hospital Length of Stay: 8 days  Code Status: DNR   Attending Provider: Shell Medrano*  Consulting Provider: Massiel Wiggins MD  Primary Care Physician: Brynn To MD  Principal Problem:Serum total bilirubin elevated    Patient information was obtained from patient and primary team.      Inpatient consult to Palliative Care  Consult performed by: Massiel Wiggins MD  Consult ordered by: Jayda Jennings DO  Reason for consult: goals of care      Assessment/Plan:     Palliative Care  Encounter for palliative care  Papito Bhakta is a 68-year-old man with a history of end stage NICM (EF 10-15%) s/p ICD, NYHA class IV who was admitted for severe heart failure exacerbation, solid-food dysphagia and found to be incidentally COVID positive. During this admission, he was started on long-term dobutamine. Palliative and Supportive Care was consulted to explore goals of care.    Advance Care Planning  Goals of Care  - Code status: updated to DNAR/DNI, ok to reverse during planned procedures  - Next of kin: two adult children, Annie (daughter) and Papito Draper (son)   - Lives with daughter Annie  - ACP documents: has [place]POST, but will need updating to reflect DNR code status  - Patient has decision making capacity  - Prognosis: poor, NYHA class IV for end-stage heart failure  - Goals: spend more time at home, improvement in condition, would want a peaceful end of life  - Plans: agreed to continue current plan of care (dysphagia work up, continued diuresis). Would benefit from transparent expectations of what work-up would mean but also the realistic potential to find a curative solution for solid-food dysphagia    Goals of Care Conversation:  - 1/10/24: I met Mr. Bhakta in his room and he was talking on his cell phone with his daughter Annie. Annie was telling him to reconsider and to  "stay in the hospital in hopes to get better. He said, "Ok, I was thinking only about myself, I'll stay." He finally did hang up the phone and he was open to our encounter. 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 He nodded appreciatively, was appropriately tearful as he lamented that he wished he had done things "better" in the past. He shares that while he has been in the hospital, he feels like he is only getting worse. He also acknowledges that so many of the things that bother him are little in the hospital (like a non-functional catheter, uncomfortable hospital bed that deflates repeatedly, etc) but that "the little things add up" and he would rather be home. He is appropriately tearful as we talked about his end-stage heart failure, noting that he is on dobutamine which is a medicine that is artificially pumping his heart because his heart is too weak to do this on its own. I shared that dobutamine is by no means perfect, and will one day stop working in the future. We did talk about code status and I recommended that when the dobutamine stops working and his heart stops, that we allow him a peaceful, natural death and protect him from chest compressions/CPR which will not work. He was surprised by this recommendation when I told him that this is a DNR code status. I acknowledged that in the past, he was full code and it was appropriate because his heart was stronger. Now we are in a different place, and know that if even the current life saving measures he's on right now (dobutamine) stops working, it is a reflection that his heart is dying and that nothing more will actually help him. I shared that by performing CPR, we will knowingly inflict more pain/suffering without meaningful benefit. Acknowledged that he " is a sukhdev human being, and it is our job to be thoughtful about how we help one another and also protect one another from things that will be harmful. I shared that often in the procedure room, DNR code statuses are reversed to full code, and will return back to DNR when he is done with the planned procedure, as the doctors would never send him to a procedure knowing he wouldn't survive. He expressed understanding and verbalized agreement to update his code status to DNR.  - He shared that his friend and his daughter will come later - he thinks they will talk about convincing him to stay a little longer. I shared that this is OK as it is apparent that he makes decisions about his life, thinking about the people he loves. He nods, shared that Annie's mother had  in  from Lupus complications, and this painful experience is carried by their family. He tried to tell Annie that he wouldn't be living forever, but acknowledges that this is a difficult thing to accept. He says he would proceed with the doctor's recommendations. I shared with him that I agreed with him, that it is apparent in the last two weeks, nothing has truly helped him get better. Acknowledged that he came to the hospital with the hope to be able to walk away doing better or feeling better, but that doesn't mean as human beings that we can guarantee that as we are not God. He agreed. I shared my concerns that he will not get better, and during his last chapter, what would he want? He tells me that he wants to be at home. I reminded him that at any point he is realizing that he is wasting his time in the hospital when time is short, he can always tell his doctors that he'd like to focus his sukhdev time with the people he loves and in the comforts of home. He nodded in understanding, stating that he will take this one day at a time.             Thank you for your consult. I will follow-up with patient. Please contact us if you have any  additional questions.    Subjective:     HPI:   Papito Bhakta is a 68-year-old man with a history of end stage NICM (EF 10-15%) s/p ICD, NYHA class IV who was admitted for severe heart failure exacerbation, solid-food dysphagia and found to be incidentally COVID positive. Palliative and Supportive Care was consulted to explore goals of care.    Hospital Course:  No notes on file    Interval History: Met with Mr. Bhakta, emotional conversation held at bedside.    Past Medical History:   Diagnosis Date    RIGOBERTO (acute kidney injury) 10/7/2020    Anticoagulant long-term use     Aspirin    CHF (congestive heart failure)     Hyperlipidemia     Hypertension     Microcytic anemia 12/31/2023    NICM (nonischemic cardiomyopathy) 6/16/2020    Obesity     AMELIA (obstructive sleep apnea) 1/7/2022    Peripheral vascular disease, unspecified 2/1/2021       Past Surgical History:   Procedure Laterality Date    ESOPHAGOGASTRODUODENOSCOPY N/A 1/3/2024    Procedure: EGD (ESOPHAGOGASTRODUODENOSCOPY);  Surgeon: Vaughn Oliver MD;  Location: [facility] ([address] [place] [address]);  Service: Endoscopy;  Laterality: N/A;    ESOPHAGOGASTRODUODENOSCOPY N/A 1/5/2024    Procedure: EGD (ESOPHAGOGASTRODUODENOSCOPY);  Surgeon: Faith Juares MD;  Location: [facility] ([address] [place] [address]);  Service: Endoscopy;  Laterality: N/A;    INSERTION OF BIVENTRICULAR IMPLANTABLE CARDIOVERTER-DEFIBRILLATOR (ICD) N/A 02/13/2019    Procedure: INSERTION, ICD, BIVENTRICULAR;  Surgeon: Shailesh Eng MD;  Location: [facility] CATH LAB;  Service: Cardiology;  Laterality: N/A;  RN PRE OP 2-6-19  1ST CASE PER  RADHA. NOTIFIED RADHA THAT ANESTHESIA IS NOT PITO FOR 1ST CASE START-LO    INSERTION OF BIVENTRICULAR IMPLANTABLE CARDIOVERTER-DEFIBRILLATOR (ICD) N/A 09/28/2020    Procedure: INSERTION, ICD, BIVENTRICULAR;  Surgeon: Jim Kwong MD;  Location: [facility] EP LAB;  Service: Cardiology;  Laterality: N/A;  NICM, CRT-D, SJM,, MAC, DM, 3 Prep*Wearing LifeVest*    oral extraction   11/2018    TESTICLE SURGERY         Review of patient's allergies indicates:   Allergen Reactions    Ramipril      Leg swelling and gynecomastia     Losartan Rash       Medications:  Continuous Infusions:   DOBUTamine IV infusion (non-titrating) 2.5 mcg/kg/min (01/10/24 0522)    furosemide (LASIX) 500 mg in 50 mL infusion (conc: 10 mg/mL) Stopped (01/10/24 0849)     Scheduled Meds:   amiodarone  200 mg Oral Daily    aspirin  325 mg Oral Daily    dexAMETHasone  1 mg Oral QID    empagliflozin  10 mg Oral Daily    heparin (porcine)  5,000 Units Subcutaneous Q8H    potassium bicarbonate  25 mEq Oral BID    pravastatin  80 mg Oral Daily    senna-docusate 8.6-50 mg  1 tablet Oral Daily     PRN Meds:acetaminophen, dextrose 10%, dextrose 10%, glucagon (human recombinant), glucose, glucose, guaiFENesin 100 mg/5 ml, naloxone, sodium chloride 0.9%    Family History       Problem Relation (Age of Onset)    Epilepsy Mother          Tobacco Use    Smoking status: Former     Current packs/day: 0.00     Average packs/day: 0.5 packs/day for 40.0 years (20.0 ttl pk-yrs)     Types: Cigarettes, Cigars     Start date: 1974     Quit date: 2014     Years since quitting: 10.0     Passive exposure: Current    Smokeless tobacco: Never   Substance and Sexual Activity    Alcohol use: Yes     Comment: once a month beer or liquor    Drug use: No    Sexual activity: Not Currently     Birth control/protection: None     Comment: uses protection sometimes       Review of Systems   Constitutional:  Positive for activity change and fatigue.   HENT:  Positive for trouble swallowing.    Cardiovascular:  Positive for leg swelling.   Neurological:  Positive for weakness.     Objective:     Vital Signs (Most Recent):  Temp: 98.5 °F (36.9 °C) (01/10/24 0403)  Pulse: 82 (01/10/24 0900)  Resp: 18 (01/10/24 0900)  BP: 99/67 (01/10/24 0900)  SpO2: 98 % (01/10/24 0900) Vital Signs (24h Range):  Temp:  [98 °F (36.7 °C)-98.5 °F (36.9 °C)] 98.5 °F  (36.9 °C)  Pulse:  [79-88] 82  Resp:  [16-18] 18  SpO2:  [95 %-98 %] 98 %  BP: ()/(62-70) 99/67     Weight:  (refusing care)  Body mass index is 28.36 kg/m².       Physical Exam  HENT:      Head: Normocephalic and atraumatic.      Right Ear: External ear normal.      Left Ear: External ear normal.      Nose: Nose normal.      Mouth/Throat:      Mouth: Mucous membranes are dry.   Eyes:      General: Scleral icterus present.      Extraocular Movements: Extraocular movements intact.   Cardiovascular:      Rate and Rhythm: Normal rate.   Pulmonary:      Effort: Pulmonary effort is normal.      Breath sounds: Normal breath sounds.   Abdominal:      General: Bowel sounds are normal.      Palpations: Abdomen is soft.   Musculoskeletal:         General: Swelling present.   Lymphadenopathy:      Cervical: No cervical adenopathy.   Skin:     General: Skin is warm and dry.   Neurological:      Mental Status: He is alert and oriented to person, place, and time. Mental status is at baseline.   Psychiatric:         Mood and Affect: Mood normal.         Behavior: Behavior normal.            Review of Symptoms      Symptom Assessment (ESAS 0-10 Scale)  Pain:  0  Dyspnea:  0  Anxiety:  0  Nausea:  0  Depression:  0  Anorexia:  0  Fatigue:  0  Insomnia:  0  Restlessness:  0  Agitation:  0         Psychosocial/Cultural:   See Palliative Psychosocial Note: No  Lives with daughter Annie. , wife  from Lupus in . Has son named Papito Allan Also lives with 9-year-old grandson, and has other grandchildren and great-grandchild.  **Primary  to Follow**  Palliative Care  Consult: No    Spiritual:  F - Taylor and Belief:  Yarsani  I - Importance:  Important  C - Community:  N/A  A - Address in Care:  Engage spiritual care        Advance Care Planning  Advance Directives:   Living Will: Yes    [place]POST: Yes    Do Not Resuscitate Status: Yes    Medical Power of : No      Decision Making:  Patient  answered questions  Goals of Care: The patient endorses that what is most important right now is to focus on spending time at home and improvement in condition but with limits to invasive therapies    Accordingly, we have decided that the best plan to meet the patient's goals includes continuing with treatment         Significant Labs: CBC:   Recent Labs   Lab 01/09/24  0509   WBC 8.08   HGB 12.7*   HCT 36.9*   PLT 89*     CMP:   Recent Labs   Lab 01/09/24  0509 01/09/24  1601   * 137   K 3.2* 3.8   CL 96 97   CO2 28 29   GLU 94 105   BUN 49* 46*   CREATININE 1.3 1.4   CALCIUM 8.7 8.8   PROT 5.9*  --    ALBUMIN 2.1*  --    BILITOT 9.8*  --    ALKPHOS 97  --    AST 32  --    ALT 30  --    ANIONGAP 11 11     CBC:   Recent Labs   Lab 01/09/24  0509   WBC 8.08   HGB 12.7*   HCT 36.9*   MCV 69*   PLT 89*     BMP:  Recent Labs   Lab 01/09/24  1601         K 3.8   CL 97   CO2 29   BUN 46*   CREATININE 1.4   CALCIUM 8.8     LFT:  Lab Results   Component Value Date    AST 32 01/09/2024    GGT 55 01/06/2024    ALKPHOS 97 01/09/2024    BILITOT 9.8 (H) 01/09/2024     Albumin:   Albumin   Date Value Ref Range Status   01/09/2024 2.1 (L) 3.5 - 5.2 g/dL Final     Protein:   Total Protein   Date Value Ref Range Status   01/09/2024 5.9 (L) 6.0 - 8.4 g/dL Final     Lactic acid:   Lab Results   Component Value Date    LACTATE 2.1 01/07/2024    LACTATE 2.3 (H) 01/01/2024       Significant Imaging: I have reviewed all pertinent imaging results/findings within the past 24 hours.    I spent a total of 75 minutes on the day of the visit. This includes face to face time in discussion of goals of care, symptom assessment, coordination of care and emotional support. This also includes non-face to face time preparing to see the patient (eg, review of tests/imaging), obtaining and/or reviewing separately obtained history, documenting clinical information in the electronic or other health record, independently interpreting  results and communicating results to the patient/family/caregiver, or care coordinator. A total of 16 minutes was spent on advance care planning, goals of care discussion, emotional support, formulating and communicating prognosis and goals of care, exploring burden/benefit of various approaches of treatment. This discussion occurred on a fully voluntary basis with the verbal consent of the patient and/or family.      Massiel Wiggins MD  Palliative Medicine  [facility]

## 2024-01-10 NOTE — ASSESSMENT & PLAN NOTE
Patient is identified as having Combined Systolic and Diastolic heart failure that is Acute on chronic. CHF is currently uncontrolled due to Pulmonary edema/pleural effusion on CXR. Latest ECHO performed and demonstrates- Results for orders placed during the hospital encounter of 11/10/23    Echo    Interpretation Summary    Left Ventricle: The left ventricle is severely dilated. Mildly increased wall thickness. There is mild concentric hypertrophy. Severe global hypokinesis present. There is severely reduced systolic function with a visually estimated ejection fraction of 10 -15%. Grade III diastolic dysfunction.    Right Ventricle: Severe right ventricular enlargement. Systolic function is severely reduced.    Mitral Valve: There is no stenosis. There is mild to moderate regurgitation with a centrally directed jet.    Tricuspid Valve: There is mild to moderate regurgitation.    Pulmonary Artery: The estimated pulmonary artery systolic pressure is 67 mmHg.  .Last BNP reviewed- and noted below   Recent Labs   Lab 01/07/24  0755   BNP 2,503*         Presented for acute on chronic SOB with associated low UOP. Afebrile HDS. BNP 3,067, Troponin 0.031. CXR with cardiomegaly, vascular congestion, and edema. Received 100 of Lasix in the ED.    - Lasix gtt increased to 20, dobutamine gtt  - Cardiology following  - RIP positive for Covid, treatment completed.  - continue home Jardiance   - Restart spironolactone at 25mg qd  - Restart BB as tolerated  - Documented allergy to ACE/ARBs  - Cardiac diet with Fluid restriction at 1.5L with strict I/Os and daily STANDING weights  - Check Electrolytes, keep Mag >2 & K+ >4  - SCDs, Ambulate as tolerated    - Strict I&Os, Daily standing weights  - Review Low-salt diet and enforce medication adherence on discharge

## 2024-01-10 NOTE — PROGRESS NOTES
[facility] - Cardiology [facility] Medicine  Progress Note    Patient Name: Papito Bhakta  MRN: [id number]  Patient Class: IP- Inpatient   Admission Date: 12/29/2023  Length of Stay: 8 days  Attending Physician: Shell Medrano*  Primary Care Provider: Brynn To MD        Subjective:     Principal Problem:Serum total bilirubin elevated        HPI:  Papito Bhakta is a 68 y.o. male with NICM, combined CHF(11/2023 EF 10 -15%, G3DD) s/p ICD placement, CKD, HLD, HTN, and AMELIA who presents for bilateral lower extremity swelling and shortness of breath. Patient reports he has been having worsening shortness of breath for the past 6 months. He had acutely worsening SOB today where he described becoming profoundly dyspneic on exertion. He reported taking 2 of his 80 mg Lasix this morning with subsequent minimal urine output and minimal improvement in his SOB. He reports SOB aggravated with lying down and he requires frequent positional changes to keep comfortable. He reports additional poor appetite and urine output for the past week although he reports he has been drinking well. He had 2 episodes of nausea/vomiting this past week as well. He denies fever/chills, chest pain, abdominal pain, recent illness, medication changes. Patient reports adherence with all medications. He reports he lives with his daughter who helps him with his medications and healthcare.    Additionally he reports chronic leg pain from a chronic RLE wound. He went to wound care yesterday where dressings were changed and he was told they were healing well. He has bilateral lower extremity edema R>L that is typical for him and he denies recent worsening.    Overview/Hospital Course:  Pt admitted to hospital medicine for acute heart failure exacerbation and inability to swallow solids for 3 months duration. Initiated on IV lasix. SLP evaluated the patient and determined that he could tolerate liquids. He had a new leukocytosis noted on  "12/31, work up significant for COVID positive. He completed remdesivir, steroids held in order to prevent worsening fluid retention, and was able to be weaned to room oxygen by 1/4. Overnight 12/31, patient becane tachycardic and hypotensive (asymptomatic). Cardiology called and concluded that he was in atrial fibrillation with RVR and he was briefly changed from oral amiodarone to IV amiodarone for one day before resuming his oral dosing. PM interrogated, noted to be functioning accurately. Cardiology signed off. IV lasix increased from pushes to gtt. Urine output did not significantly increase, bladder scan with 325ml urine. Mcmahon placed.     GI was consulted for his ongoing dysphagia. With his ongoing aggressive diuresis, COVID infection, and episode of hemodynamic stability, the EGD was deferred until he becomes more euvolemic.     Transient episode of increased respiratory distress 1/6. EKG without significant changes from prior. Troponin negative. CXR showed stability from prior imaging.    During his admission, noted to have chronic bilirubinemia, assumed likely secondary to congestive hepatopathy. RUQ US without biliary obstruction. Hemolysis labs negative. Tbili continued to uptrend despite improvement in fluid status and renal function. Also noted to have worsening thrombocytopenia, despite normal LFTs. Hepatology consulted, who agree with congestive hepatopathy. Further work up pending. He has chronic lower extremity leg wounds secondary to peripheral vascular disease. Wound care was consulted and assisted in managing the wounds during his admission. He has outpatient follow-up scheduled with vascular surgery at the end of February. Cardiology recommending aggressive diuresis, started him on a dobutamine gtt and continued lasix gtt.    1/10 expressing desire to stop further treatment and to discharge to home. States he wishes to live comfortably at home and "if it's my time it's my time". Palliative care " "consulted.     Interval History: HDS on RA, UOP appropriate. Expressing desire to stop further treatment and to discharge to home. States he wishes to live comfortably at home and "if it's my time it's my time". Palliative care consulted for possible hospice discussion, however he is agreeing to stay and proceed with treatment at this time.     Review of Systems  Objective:     Vital Signs (Most Recent):  Temp: 98.1 °F (36.7 °C) (01/10/24 1143)  Pulse: 88 (01/10/24 1143)  Resp: 17 (01/10/24 1143)  BP: 98/64 (01/10/24 1143)  SpO2: 97 % (01/10/24 1143) Vital Signs (24h Range):  Temp:  [98.1 °F (36.7 °C)-98.5 °F (36.9 °C)] 98.1 °F (36.7 °C)  Pulse:  [79-88] 88  Resp:  [16-18] 17  SpO2:  [95 %-98 %] 97 %  BP: ()/(62-70) 98/64     Weight:  (refusing care)  Body mass index is 28.36 kg/m².    Intake/Output Summary (Last 24 hours) at 1/10/2024 1327  Last data filed at 1/10/2024 0830  Gross per 24 hour   Intake 1097.78 ml   Output 3500 ml   Net -2402.22 ml         Physical Exam  Vitals and nursing note reviewed.   Constitutional:       Appearance: He is ill-appearing.   HENT:      Head: Normocephalic and atraumatic.      Mouth/Throat:      Mouth: Mucous membranes are dry.   Eyes:      General: Scleral icterus present.      Extraocular Movements: Extraocular movements intact.      Comments: Anisocoria   Neck:      Vascular: JVD (to the ear) present.   Cardiovascular:      Rate and Rhythm: Normal rate and regular rhythm.      Heart sounds:      Gallop present.   Pulmonary:      Effort: Pulmonary effort is normal. No respiratory distress.      Breath sounds: Wheezing (expiratory) present.   Abdominal:      General: Abdomen is flat. There is no distension.      Palpations: Abdomen is soft.      Tenderness: There is no abdominal tenderness.   Musculoskeletal:         General: Tenderness (b/l LE) present.      Right lower leg: Edema present.      Left lower leg: Edema present.   Skin:     General: Skin is warm and dry. "   Neurological:      General: No focal deficit present.      Mental Status: He is alert and oriented to person, place, and time.      Motor: Weakness present.   Psychiatric:         Mood and Affect: Mood normal.         Behavior: Behavior normal.             Significant Labs: All pertinent labs within the past 24 hours have been reviewed.    Significant Imaging: I have reviewed all pertinent imaging results/findings within the past 24 hours.    Assessment/Plan:      * Serum total bilirubin elevated  Continues to increase. Direct bilirubinemia. No signs of hemolysis.    - Hepatology recommending autoimmune hepatitis workup  - Daily CMPs  - Appears chronic since November. Previously reported as secondary to congestive hepatopathy however remaining LFTs likely WNL.   - US Liver with doppler showing: Bidirectional portal vein flow may be seen with right heart failure, tricuspid regurgitation, or portal hypertension. Evidence of volume overload with distended IVC and hepatic veins.  Pulsatile flow through the hepatic veins (also possibly suggesting tricuspid regurgitation). Suspected hepatic steatosis. No evidence of biliary obstruction.  Possible genetic condition      Acute hypoxemic respiratory failure  Patient with Hypoxic Respiratory failure which is Acute.  he is not on home oxygen. Supplemental oxygen was provided and noted-      Patient with persistent O2 requirement of 2-3 LPM. Largest contribution likely due to fluid overload in setting of heart failure exacerbation and possible urinary retention. Also found to be COVID positive, s/p course of remdesivir. With his persistent hypoxia, possible that he is developing a post viral pneumonia, or an aspiration pneumonia in setting of dysphagia.   Troponin and repeat EKG WNL, concern for ACS low.   He does have baseline arrhythmia with PM in place, recently interrogated. No AS on recent TTE.    - Continue lasix gtt (see acute on chronic HF) and dobutamine gtt  -  Adding GDMT as tolerated  - Wean O2 as tolerated  - BNP repeated and still elevated at 2503.  - Consulting cardiology for failure to improve despite diuresis  - PT/OT consulted      Acute on chronic combined systolic and diastolic CHF (congestive heart failure)  Patient is identified as having Combined Systolic and Diastolic heart failure that is Acute on chronic. CHF is currently uncontrolled due to Pulmonary edema/pleural effusion on CXR. Latest ECHO performed and demonstrates- Results for orders placed during the hospital encounter of 11/10/23    Echo    Interpretation Summary    Left Ventricle: The left ventricle is severely dilated. Mildly increased wall thickness. There is mild concentric hypertrophy. Severe global hypokinesis present. There is severely reduced systolic function with a visually estimated ejection fraction of 10 -15%. Grade III diastolic dysfunction.    Right Ventricle: Severe right ventricular enlargement. Systolic function is severely reduced.    Mitral Valve: There is no stenosis. There is mild to moderate regurgitation with a centrally directed jet.    Tricuspid Valve: There is mild to moderate regurgitation.    Pulmonary Artery: The estimated pulmonary artery systolic pressure is 67 mmHg.  .Last BNP reviewed- and noted below   Recent Labs   Lab 01/07/24  0755   BNP 2,503*         Presented for acute on chronic SOB with associated low UOP. Afebrile HDS. BNP 3,067, Troponin 0.031. CXR with cardiomegaly, vascular congestion, and edema. Received 100 of Lasix in the ED.    - Lasix gtt increased to 20, dobutamine gtt  - Cardiology following  - RIP positive for Covid, treatment completed.  - continue home Jardiance   - Restart spironolactone at 25mg qd  - Restart BB as tolerated  - Documented allergy to ACE/ARBs  - Cardiac diet with Fluid restriction at 1.5L with strict I/Os and daily STANDING weights  - Check Electrolytes, keep Mag >2 & K+ >4  - SCDs, Ambulate as tolerated    - Strict I&Os,  "Daily standing weights  - Review Low-salt diet and enforce medication adherence on discharge    Dysphagia  Reports a 3 month history of inability to tolerate solid foods. Notable vomiting immediatly with swallowing food/liquid. No complaints of nausea. SLP has assessed him, can tolerate liquids.     - Continue liquid diet, Boost TID  - GI cancelled EGD and signing off. Will re-consult once patient is more euvolemic and hemodynamically stable.       Acute renal failure superimposed on stage 3a chronic kidney disease  Creatinine 2.1 on admit, baseline around 1.4. Likely prerenal etiology given urine sodium and FENa vs progression of CKD. Improving with increased diuresis     Recent Labs     01/08/24  0430 01/09/24  0509 01/09/24  1601   BUN 51* 49* 46*   CREATININE 1.4 1.3 1.4         Estimated Creatinine Clearance: 69.2 mL/min (based on SCr of 1.4 mg/dL).  RESOLVED    - Renal US: renal cysts, no hydronephrosis  - Urine Na: 14, Pr/Cr: 0.72; FENa 0.2%  - Monitor urine output and serial BMP and adjust therapy as needed.   - Strict I&Os and daily weights   - Avoid nephrotoxic agents such as NSAIDs, gadolinium and IV radiocontrast.  - Renally dose meds to current GFR.  - Maintain MAP > 65.  - Nephrology referral outpatient    ACP (advance care planning)  1/10, patient stating desire to go home. He is tired of his prolonged hospital course and feeling defeated at the slow trajectory of his remaining course. States he wants to be comfortable at home, "if it's my time it's my time". We discussed benefits of staying, include improved quality of life if we are able to medically optimize  Palliative care consulted, greatly appreciate assistance. With this conversation, along with conversations with family, he has agreed to stay for now. Will continue to engage in GOC conversations.     DNR order placed per palliative  Need to assess utility of ICD      CKD (chronic kidney disease)  Creatine stable for now. BMP reviewed- noted " Estimated Creatinine Clearance: 69.2 mL/min (based on SCr of 1.4 mg/dL). according to latest data. Based on current GFR, CKD stage is stage 3 - GFR 30-59.  Monitor UOP and serial BMP and adjust therapy as needed. Renally dose meds. Avoid nephrotoxic medications and procedures.    COVID-19  COVID positive, noted 12/31    - Finished remdesivir course 01/02/24    Microcytic anemia  Iron studies notable for Fe 26 and sat iron 8%. TIBC, ferritin, transferrin wnl. Likely iron deficiency anemia.    - Daily CBCs  - Feraheme given    Pupil asymmetry  Notable asymetry on pupils by patient. Chart review shows left orbital trauma in 2021 with notes concerned for dilation and vision changes. When talking to daughter, this is chronic. States no vision changes or neuro symptoms whatsoever.     AMELIA (obstructive sleep apnea)  Carried diagnosis of AMELIA per chart, however he does not sleep with CPAP at home and declines while here      Peripheral vascular disease, unspecified  Patient with chronic lower leg wounds that recently established with wound care. He is set to follow up with vascular surgery outpatient at the end of February.    Appreciate wound care recommendations  Continue diuresis       NICM (nonischemic cardiomyopathy)  ASA 325mg qd per home medication list. Reason for this dose unclear.      Biventricular ICD (implantable cardioverter-defibrillator) in place  Stable on admission, no acute issues, he denies discharge. QTc chronically prolonged. Continue home amiodarone for NSVT prevention. Monitor on telemetry     - 12/31, noted to have AF RVR with hypotension. Started on amio gtt. Per report, pacemaker malfunctioning.  - Restarted on home oral amio  - Device interrogated, no complications. EP has signed off  - Consider deactivating in light of new DNR status, will discuss with patient    Hyperlipidemia  Stable. Continue Home Pravastatin.        Essential hypertension  Chronic, controlled. Latest blood pressure and vitals  reviewed-     Temp:  [98.1 °F (36.7 °C)-98.5 °F (36.9 °C)]   Pulse:  [79-88]   Resp:  [16-18]   BP: ()/(62-70)   SpO2:  [95 %-98 %] .   Home meds for hypertension were reviewed and noted below.   Hypertension Medications               furosemide (LASIX) 80 MG tablet TAKE 1 TABLET EVERY DAY    metoprolol succinate (TOPROL-XL) 50 MG 24 hr tablet TAKE 1 TABLET EVERY DAY    spironolactone (ALDACTONE) 25 MG tablet TAKE 1/2 TABLET (12.5 MG TOTAL) ONCE DAILY. HOLD IF SYSTOLIC BLOOD PRESSURE IS LESS THAN 110            While in the hospital, will manage blood pressure as follows; Adjust home antihypertensive regimen as follows- Holding in setting of borderline pressures in setting of new diuresis     Will utilize p.r.n. blood pressure medication only if patient's blood pressure greater than 180/110 and he develops symptoms such as worsening chest pain or shortness of breath.      VTE Risk Mitigation (From admission, onward)           Ordered     heparin (porcine) injection 5,000 Units  Every 8 hours         12/29/23 1759     IP VTE HIGH RISK PATIENT  Once         12/29/23 1759     Place sequential compression device  Until discontinued         12/29/23 1759                    Discharge Planning   MARIIA: 1/12/2024     Code Status: DNR   Is the patient medically ready for discharge?: No    Reason for patient still in hospital (select all that apply): Patient trending condition  Discharge Plan A: Home with family                  Jayda Jennings,   Department of Hospital Medicine   [facility] - Cardiology Stepdown

## 2024-01-10 NOTE — ASSESSMENT & PLAN NOTE
Patient with Hypoxic Respiratory failure which is Acute.  he is not on home oxygen. Supplemental oxygen was provided and noted-      Patient with persistent O2 requirement of 2-3 LPM. Largest contribution likely due to fluid overload in setting of heart failure exacerbation and possible urinary retention. Also found to be COVID positive, s/p course of remdesivir. With his persistent hypoxia, possible that he is developing a post viral pneumonia, or an aspiration pneumonia in setting of dysphagia.   Troponin and repeat EKG WNL, concern for ACS low.   He does have baseline arrhythmia with PM in place, recently interrogated. No AS on recent TTE.    - Continue lasix gtt (see acute on chronic HF) and dobutamine gtt  - Adding GDMT as tolerated  - Wean O2 as tolerated  - BNP repeated and still elevated at 2503.  - Consulting cardiology for failure to improve despite diuresis  - PT/OT consulted

## 2024-01-11 LAB
ALBUMIN SERPL BCP-MCNC: 2.2 G/DL (ref 3.5–5.2)
ALP SERPL-CCNC: 99 U/L (ref 55–135)
ALT SERPL W/O P-5'-P-CCNC: 28 U/L (ref 10–44)
ANION GAP SERPL CALC-SCNC: 10 MMOL/L (ref 8–16)
ANION GAP SERPL CALC-SCNC: 8 MMOL/L (ref 8–16)
AST SERPL-CCNC: 35 U/L (ref 10–40)
BILIRUB SERPL-MCNC: 9 MG/DL (ref 0.1–1)
BUN SERPL-MCNC: 43 MG/DL (ref 8–23)
BUN SERPL-MCNC: 46 MG/DL (ref 8–23)
CALCIUM SERPL-MCNC: 8.9 MG/DL (ref 8.7–10.5)
CALCIUM SERPL-MCNC: 9 MG/DL (ref 8.7–10.5)
CHLORIDE SERPL-SCNC: 96 MMOL/L (ref 95–110)
CHLORIDE SERPL-SCNC: 96 MMOL/L (ref 95–110)
CO2 SERPL-SCNC: 32 MMOL/L (ref 23–29)
CO2 SERPL-SCNC: 32 MMOL/L (ref 23–29)
CREAT SERPL-MCNC: 1.3 MG/DL (ref 0.5–1.4)
CREAT SERPL-MCNC: 1.4 MG/DL (ref 0.5–1.4)
ERYTHROCYTE [DISTWIDTH] IN BLOOD BY AUTOMATED COUNT: 23.1 % (ref 11.5–14.5)
EST. GFR  (NO RACE VARIABLE): 54.7 ML/MIN/1.73 M^2
EST. GFR  (NO RACE VARIABLE): 59.8 ML/MIN/1.73 M^2
GLUCOSE SERPL-MCNC: 104 MG/DL (ref 70–110)
GLUCOSE SERPL-MCNC: 109 MG/DL (ref 70–110)
HCT VFR BLD AUTO: 35.3 % (ref 40–54)
HGB BLD-MCNC: 12.1 G/DL (ref 14–18)
LKM AB SER-ACNC: 1.5 UNITS
MAGNESIUM SERPL-MCNC: 2.3 MG/DL (ref 1.6–2.6)
MCH RBC QN AUTO: 23.6 PG (ref 27–31)
MCHC RBC AUTO-ENTMCNC: 34.3 G/DL (ref 32–36)
MCV RBC AUTO: 69 FL (ref 82–98)
PHOSPHATE SERPL-MCNC: 2.9 MG/DL (ref 2.7–4.5)
PLATELET # BLD AUTO: 103 K/UL (ref 150–450)
PMV BLD AUTO: ABNORMAL FL (ref 9.2–12.9)
POTASSIUM SERPL-SCNC: 3.5 MMOL/L (ref 3.5–5.1)
POTASSIUM SERPL-SCNC: 3.6 MMOL/L (ref 3.5–5.1)
PROT SERPL-MCNC: 6.3 G/DL (ref 6–8.4)
RBC # BLD AUTO: 5.13 M/UL (ref 4.6–6.2)
SODIUM SERPL-SCNC: 136 MMOL/L (ref 136–145)
SODIUM SERPL-SCNC: 138 MMOL/L (ref 136–145)
WBC # BLD AUTO: 7.73 K/UL (ref 3.9–12.7)

## 2024-01-11 PROCEDURE — 36415 COLL VENOUS BLD VENIPUNCTURE: CPT | Performed by: STUDENT IN AN ORGANIZED HEALTH CARE EDUCATION/TRAINING PROGRAM

## 2024-01-11 PROCEDURE — 84100 ASSAY OF PHOSPHORUS: CPT | Performed by: STUDENT IN AN ORGANIZED HEALTH CARE EDUCATION/TRAINING PROGRAM

## 2024-01-11 PROCEDURE — 80053 COMPREHEN METABOLIC PANEL: CPT | Performed by: STUDENT IN AN ORGANIZED HEALTH CARE EDUCATION/TRAINING PROGRAM

## 2024-01-11 PROCEDURE — 80048 BASIC METABOLIC PNL TOTAL CA: CPT | Mod: XB

## 2024-01-11 PROCEDURE — 20600001 HC STEP DOWN PRIVATE ROOM

## 2024-01-11 PROCEDURE — 25000003 PHARM REV CODE 250: Performed by: STUDENT IN AN ORGANIZED HEALTH CARE EDUCATION/TRAINING PROGRAM

## 2024-01-11 PROCEDURE — 99233 SBSQ HOSP IP/OBS HIGH 50: CPT | Mod: 95,,, | Performed by: STUDENT IN AN ORGANIZED HEALTH CARE EDUCATION/TRAINING PROGRAM

## 2024-01-11 PROCEDURE — 83735 ASSAY OF MAGNESIUM: CPT | Performed by: STUDENT IN AN ORGANIZED HEALTH CARE EDUCATION/TRAINING PROGRAM

## 2024-01-11 PROCEDURE — 25000242 PHARM REV CODE 250 ALT 637 W/ HCPCS

## 2024-01-11 PROCEDURE — 63600175 PHARM REV CODE 636 W HCPCS: Performed by: STUDENT IN AN ORGANIZED HEALTH CARE EDUCATION/TRAINING PROGRAM

## 2024-01-11 PROCEDURE — 36415 COLL VENOUS BLD VENIPUNCTURE: CPT | Mod: XB

## 2024-01-11 PROCEDURE — 25000003 PHARM REV CODE 250

## 2024-01-11 PROCEDURE — 97535 SELF CARE MNGMENT TRAINING: CPT

## 2024-01-11 PROCEDURE — 85027 COMPLETE CBC AUTOMATED: CPT | Performed by: STUDENT IN AN ORGANIZED HEALTH CARE EDUCATION/TRAINING PROGRAM

## 2024-01-11 PROCEDURE — 97530 THERAPEUTIC ACTIVITIES: CPT

## 2024-01-11 PROCEDURE — 63600175 PHARM REV CODE 636 W HCPCS

## 2024-01-11 PROCEDURE — 27000207 HC ISOLATION

## 2024-01-11 RX ADMIN — ASPIRIN 325 MG: 325 TABLET, COATED ORAL at 09:01

## 2024-01-11 RX ADMIN — ALUMINUM HYDROXIDE, MAGNESIUM HYDROXIDE, AND DIMETHICONE 10 ML: 400; 400; 40 SUSPENSION ORAL at 09:01

## 2024-01-11 RX ADMIN — POTASSIUM BICARBONATE 25 MEQ: 978 TABLET, EFFERVESCENT ORAL at 09:01

## 2024-01-11 RX ADMIN — SENNOSIDES AND DOCUSATE SODIUM 1 TABLET: 8.6; 5 TABLET ORAL at 09:01

## 2024-01-11 RX ADMIN — PRAVASTATIN SODIUM 80 MG: 40 TABLET ORAL at 09:01

## 2024-01-11 RX ADMIN — DOBUTAMINE HYDROCHLORIDE 2.5 MCG/KG/MIN: 400 INJECTION INTRAVENOUS at 03:01

## 2024-01-11 RX ADMIN — ACETAMINOPHEN 650 MG: 325 TABLET ORAL at 09:01

## 2024-01-11 RX ADMIN — HEPARIN SODIUM 5000 UNITS: 5000 INJECTION INTRAVENOUS; SUBCUTANEOUS at 01:01

## 2024-01-11 RX ADMIN — ALUMINUM HYDROXIDE, MAGNESIUM HYDROXIDE, AND DIMETHICONE 10 ML: 400; 400; 40 SUSPENSION ORAL at 05:01

## 2024-01-11 RX ADMIN — HEPARIN SODIUM 5000 UNITS: 5000 INJECTION INTRAVENOUS; SUBCUTANEOUS at 09:01

## 2024-01-11 RX ADMIN — FUROSEMIDE 20 MG/HR: 10 INJECTION, SOLUTION INTRAMUSCULAR; INTRAVENOUS at 09:01

## 2024-01-11 RX ADMIN — FUROSEMIDE 20 MG/HR: 10 INJECTION, SOLUTION INTRAMUSCULAR; INTRAVENOUS at 03:01

## 2024-01-11 RX ADMIN — SPIRONOLACTONE 25 MG: 25 TABLET ORAL at 09:01

## 2024-01-11 RX ADMIN — ALUMINUM HYDROXIDE, MAGNESIUM HYDROXIDE, AND DIMETHICONE 10 ML: 400; 400; 40 SUSPENSION ORAL at 01:01

## 2024-01-11 RX ADMIN — HEPARIN SODIUM 5000 UNITS: 5000 INJECTION INTRAVENOUS; SUBCUTANEOUS at 05:01

## 2024-01-11 RX ADMIN — EMPAGLIFLOZIN 10 MG: 10 TABLET, FILM COATED ORAL at 09:01

## 2024-01-11 RX ADMIN — AMIODARONE HYDROCHLORIDE 200 MG: 200 TABLET ORAL at 09:01

## 2024-01-11 NOTE — PT/OT/SLP PROGRESS
Occupational Therapy   Treatment    Name: Papito Bhakta  MRN: [id number]  Admitting Diagnosis:  Serum total bilirubin elevated  8 Days Post-Op    Recommendations:     Discharge Recommendations: Moderate Intensity Therapy  Discharge Equipment Recommendations:  bedside commode, wheelchair  Barriers to discharge:  None    Assessment:     Papito Bhakta is a 68 y.o. male with a medical diagnosis of Serum total bilirubin elevated.  He presents with the following performance deficits affecting function are weakness, impaired endurance, impaired self care skills, impaired functional mobility, gait instability, impaired balance, pain, decreased safety awareness, decreased lower extremity function, decreased ROM, impaired cardiopulmonary response to activity, decreased upper extremity function. Prior to performance of bed mobility, observed pt's IV dislodged at rest and RN notified. Pt remains limited in ADLs, functional mobility, and functional transfers and is currently not performing tasks at PLOF. Pt would continue to benefit from skilled OT services to maximize functional independence with ADLs and functional mobility, reduce caregiver burden, and facilitate safe discharge in the least restrictive environment. Patient continues to demonstrate the need for moderate intensity therapy on a daily basis post acute exhibited by decreased independence with self-care and functional mobility     Patient requires a hospital bed for positioning of the body in ways that are not feasible with an ordinary bed. The patient requires special positioning for pain relief, limited mobility, and/or being unable to independently make changes in body position without the use of a hospital bed. Pillows and wedges will not be adequate for resolving these positional issues.       Patient has a mobility limitation that significantly impairs their ability to participate in one or more mobility related activities of daily living, including toileting.  This deficit can be resolved by using a bedside commode. Patient demonstrates mobility limitations that will cause them to be confined to one room at home, and a bathroom will not be accessible. Using a bedside commode will greatly improve the patient's ability to participate in MRADLs.     Rehab Prognosis:  Good; patient would benefit from acute skilled OT services to address these deficits and reach maximum level of function.       Plan:     Patient to be seen 4 x/week to address the above listed problems via self-care/home management, therapeutic activities, therapeutic exercises, neuromuscular re-education  Plan of Care Expires: 02/07/24  Plan of Care Reviewed with: patient    Subjective     Chief Complaint: (R) wound drainage  Patient/Family Comments/goals: to get better  Pain/Comfort:  Pain Rating 1: 0/10  Pain Rating Post-Intervention 1: 0/10    Objective:     Communicated with: RN prior to session.  Patient found HOB elevated with PureWick, peripheral IV, telemetry upon OT entry to room.    General Precautions: Standard, fall, airborne, contact, droplet    Orthopedic Precautions:N/A  Braces: N/A  Respiratory Status: Room air     Occupational Performance:     Bed Mobility:    Patient completed Scooting/Bridging with moderate assistance  Patient completed Supine to Sit with minimum assistance  Patient completed Sit to Supine with minimum assistance     Functional Mobility/Transfers:  Patient completed Sit <> Stand Transfer with moderate assistance  with  rolling walker   Functional Mobility: Pt tolerated static standing for ~2 minutes with CGA and RW while completing grooming and linen change  Pt observed with forward flexed posture and attempting to rest forearms on RW  Verbal/tactile cues provided for hand placement, upright posture, and forward gaze    Activities of Daily Living:  Grooming: contact guard assistance for functional balance as pt washed face in standing with RW  Lower Body Dressing: total  assistance required to don (R) sock sitting EOB      [facility] 6 Click ADL: 16    Treatment & Education:  -Education on energy conservation and task modification to maximize safety and (I) during ADLs and mobility  -Education on importance of OOB activity to improve overall activity tolerance and promote recovery  -Pt educated to call for assistance and to transfer with hospital staff only  -Provided education regarding role of OT, POC, & discharge recommendations with pt verbalizing understanding.  Pt had no further questions & when asked whether there were any concerns pt reported none.     Patient left HOB elevated with all lines intact, call button in reach, and RN notified    GOALS:   Multidisciplinary Problems       Occupational Therapy Goals          Problem: Occupational Therapy    Goal Priority Disciplines Outcome Interventions   Occupational Therapy Goal     OT, PT/OT Ongoing, Progressing    Description: Goals to be met by: 2/7/24 (1 month)      Patient will increase functional independence with ADLs by performing:    UE Dressing with Supervision.  LE Dressing with Supervision.  Grooming while standing at sink with Modified [place].  Toileting from toilet with Modified [place] for hygiene and clothing management.   Rolling to Bilateral with [place].   Supine to sit with [place].  Step transfer with Modified [place]  Toilet transfer to toilet with Modified [place].                       Time Tracking:     OT Date of Treatment: 01/11/24  OT Start Time: 1338  OT Stop Time: 1408  OT Total Time (min): 30 min    Billable Minutes:Self Care/Home Management 15  Therapeutic Activity 15    OT/DIONICIO: OT     Number of DIONICIO visits since last OT visit: 2    1/11/2024

## 2024-01-11 NOTE — ASSESSMENT & PLAN NOTE
Patient is identified as having Combined Systolic and Diastolic heart failure that is Acute on chronic. CHF is currently uncontrolled due to Pulmonary edema/pleural effusion on CXR. Latest ECHO performed and demonstrates- Results for orders placed during the hospital encounter of 11/10/23    Echo    Interpretation Summary    Left Ventricle: The left ventricle is severely dilated. Mildly increased wall thickness. There is mild concentric hypertrophy. Severe global hypokinesis present. There is severely reduced systolic function with a visually estimated ejection fraction of 10 -15%. Grade III diastolic dysfunction.    Right Ventricle: Severe right ventricular enlargement. Systolic function is severely reduced.    Mitral Valve: There is no stenosis. There is mild to moderate regurgitation with a centrally directed jet.    Tricuspid Valve: There is mild to moderate regurgitation.    Pulmonary Artery: The estimated pulmonary artery systolic pressure is 67 mmHg.  .Last BNP reviewed- and noted below   Recent Labs   Lab 01/07/24  0755   BNP 2,503*         Presented for acute on chronic SOB with associated low UOP. Afebrile HDS. BNP 3,067, Troponin 0.031. CXR with cardiomegaly, vascular congestion, and edema. Received 100 of Lasix in the ED.    - Lasix gtt increased to 20, dobutamine gtt  - Cardiology following  - RIP positive for Covid, treatment completed.  - continue home Jardiance   - Restart spironolactone at 25mg qd  - Restart BB as tolerated  - Documented allergy to ACE/ARBs  - Cardiac diet with Fluid restriction at 1.5L with strict I/Os and daily STANDING weights  - Check Electrolytes, keep Mag >2 & K+ >4  - SCDs, Ambulate as tolerated    - Strict I&Os, Daily standing weights  - Review Low-salt diet and enforce medication adherence on discharge

## 2024-01-11 NOTE — PROGRESS NOTES
Dmitry Colon - Cardiology [facility] Medicine  Progress Note    Patient Name: Papito Bhakta  MRN: [id number]  Patient Class: IP- Inpatient   Admission Date: 12/29/2023  Length of Stay: 9 days  Attending Physician: Cayetano Somers MD  Primary Care Provider: Brynn To MD        Subjective:     Principal Problem:Serum total bilirubin elevated        HPI:  Papito Bhakta is a 68 y.o. male with NICM, combined CHF(11/2023 EF 10 -15%, G3DD) s/p ICD placement, CKD, HLD, HTN, and AMELIA who presents for bilateral lower extremity swelling and shortness of breath. Patient reports he has been having worsening shortness of breath for the past 6 months. He had acutely worsening SOB today where he described becoming profoundly dyspneic on exertion. He reported taking 2 of his 80 mg Lasix this morning with subsequent minimal urine output and minimal improvement in his SOB. He reports SOB aggravated with lying down and he requires frequent positional changes to keep comfortable. He reports additional poor appetite and urine output for the past week although he reports he has been drinking well. He had 2 episodes of nausea/vomiting this past week as well. He denies fever/chills, chest pain, abdominal pain, recent illness, medication changes. Patient reports adherence with all medications. He reports he lives with his daughter who helps him with his medications and healthcare.    Additionally he reports chronic leg pain from a chronic RLE wound. He went to wound care yesterday where dressings were changed and he was told they were healing well. He has bilateral lower extremity edema R>L that is typical for him and he denies recent worsening.    Overview/Hospital Course:  Pt admitted to hospital medicine for acute heart failure exacerbation and inability to swallow solids for 3 months duration. Initiated on IV lasix. SLP evaluated the patient and determined that he could tolerate liquids. He had a new leukocytosis noted on  "12/31, work up significant for COVID positive. He completed remdesivir, steroids held in order to prevent worsening fluid retention, and was able to be weaned to room oxygen by 1/4. Overnight 12/31, patient becane tachycardic and hypotensive (asymptomatic). Cardiology called and concluded that he was in atrial fibrillation with RVR and he was briefly changed from oral amiodarone to IV amiodarone for one day before resuming his oral dosing. PM interrogated, noted to be functioning accurately. Cardiology signed off. IV lasix increased from pushes to gtt. Urine output did not significantly increase, bladder scan with 325ml urine. Mcmahon placed.     GI was consulted for his ongoing dysphagia. With his ongoing aggressive diuresis, COVID infection, and episode of hemodynamic stability, the EGD was deferred until he becomes more euvolemic.     Transient episode of increased respiratory distress 1/6. EKG without significant changes from prior. Troponin negative. CXR showed stability from prior imaging.    During his admission, noted to have chronic bilirubinemia, assumed likely secondary to congestive hepatopathy. RUQ US without biliary obstruction. Hemolysis labs negative. Tbili continued to uptrend despite improvement in fluid status and renal function. Also noted to have worsening thrombocytopenia, despite normal LFTs. Hepatology consulted, who agree with congestive hepatopathy. Further work up pending. He has chronic lower extremity leg wounds secondary to peripheral vascular disease. Wound care was consulted and assisted in managing the wounds during his admission. He has outpatient follow-up scheduled with vascular surgery at the end of February. Cardiology recommending aggressive diuresis, started him on a dobutamine gtt and continued lasix gtt.    1/10 expressing desire to stop further treatment and to discharge to home. States he wishes to live comfortably at home and "if it's my time it's my time". Palliative care " consulted.     Interval History: A couple runs of NSVT overnight. Soft BPs. Afebrile and on RA. Interested in PT/OT to improve strength.    Objective:     Vital Signs (Most Recent):  Temp: 97.9 °F (36.6 °C) (01/11/24 0738)  Pulse: 77 (01/11/24 0738)  Resp: 16 (01/11/24 0738)  BP: (!) 87/56 (01/11/24 0738)  SpO2: 98 % (01/11/24 0738) Vital Signs (24h Range):  Temp:  [97.4 °F (36.3 °C)-98.2 °F (36.8 °C)] 97.9 °F (36.6 °C)  Pulse:  [75-91] 77  Resp:  [16-18] 16  SpO2:  [95 %-98 %] 98 %  BP: (86-99)/(56-67) 87/56     Weight: 100.2 kg (220 lb 14.4 oz)  Body mass index is 26.19 kg/m².    Intake/Output Summary (Last 24 hours) at 1/11/2024 0859  Last data filed at 1/11/2024 0529  Gross per 24 hour   Intake 120 ml   Output 2100 ml   Net -1980 ml         Physical Exam  Vitals and nursing note reviewed.   Constitutional:       Appearance: He is ill-appearing.   HENT:      Head: Normocephalic and atraumatic.      Mouth/Throat:      Mouth: Mucous membranes are dry.   Eyes:      General: Scleral icterus present.      Extraocular Movements: Extraocular movements intact.      Comments: Anisocoria   Neck:      Vascular: JVD (to the ear) present.   Cardiovascular:      Rate and Rhythm: Normal rate and regular rhythm.      Heart sounds:      Gallop present.   Pulmonary:      Effort: Pulmonary effort is normal. No respiratory distress.      Breath sounds: Wheezing present.   Abdominal:      General: Abdomen is flat. There is no distension.      Palpations: Abdomen is soft.      Tenderness: There is no abdominal tenderness.   Musculoskeletal:         General: Tenderness (b/l LE) present.      Right lower leg: Edema present.      Left lower leg: Edema present.   Skin:     General: Skin is warm and dry.   Neurological:      General: No focal deficit present.      Mental Status: He is alert and oriented to person, place, and time.      Motor: Weakness present.   Psychiatric:         Mood and Affect: Mood normal.         Behavior: Behavior  "normal.             Significant Labs: All pertinent labs within the past 24 hours have been reviewed.    Significant Imaging: I have reviewed all pertinent imaging results/findings within the past 24 hours.    Assessment/Plan:      * Serum total bilirubin elevated  Continues to increase. Direct bilirubinemia. No signs of hemolysis.    - Hepatology recommending autoimmune hepatitis workup  - Daily CMPs  - Appears chronic since November. Previously reported as secondary to congestive hepatopathy however remaining LFTs likely WNL.   - US Liver with doppler showing: Bidirectional portal vein flow may be seen with right heart failure, tricuspid regurgitation, or portal hypertension. Evidence of volume overload with distended IVC and hepatic veins.  Pulsatile flow through the hepatic veins (also possibly suggesting tricuspid regurgitation). Suspected hepatic steatosis. No evidence of biliary obstruction.  Possible genetic condition      ACP (advance care planning)  1/10, patient stating desire to go home. He is tired of his prolonged hospital course and feeling defeated at the slow trajectory of his remaining course. States he wants to be comfortable at home, "if it's my time it's my time". We discussed benefits of staying, include improved quality of life if we are able to medically optimize  Palliative care consulted, greatly appreciate assistance. With this conversation, along with conversations with family, he has agreed to stay for now. Will continue to engage in GOC conversations.     DNR order placed per palliative  Need to assess utility of ICD    1/11, patient now stating that he would like all interventions done if needed and does not want ICD turned off  Order placed to make patient Full Code       CKD (chronic kidney disease)  Creatine stable for now. BMP reviewed- noted Estimated Creatinine Clearance: 68.5 mL/min (based on SCr of 1.3 mg/dL). according to latest data. Based on current GFR, CKD stage is stage 3 " - GFR 30-59.  Monitor UOP and serial BMP and adjust therapy as needed. Renally dose meds. Avoid nephrotoxic medications and procedures.    COVID-19  COVID positive, noted 12/31    - Finished remdesivir course 01/02/24    Microcytic anemia  Iron studies notable for Fe 26 and sat iron 8%. TIBC, ferritin, transferrin wnl. Likely iron deficiency anemia.    - Daily CBCs  - Feraheme given    Dysphagia  Reports a 3 month history of inability to tolerate solid foods. Notable vomiting immediatly with swallowing food/liquid. No complaints of nausea. SLP has assessed him, can tolerate liquids.     - Continue liquid diet, Boost TID  - GI cancelled EGD and signing off. Will re-consult once patient is more euvolemic and hemodynamically stable.       Pupil asymmetry  Notable asymetry on pupils by patient. Chart review shows left orbital trauma in 2021 with notes concerned for dilation and vision changes. When talking to daughter, this is chronic. States no vision changes or neuro symptoms whatsoever.     Acute on chronic combined systolic and diastolic CHF (congestive heart failure)  Patient is identified as having Combined Systolic and Diastolic heart failure that is Acute on chronic. CHF is currently uncontrolled due to Pulmonary edema/pleural effusion on CXR. Latest ECHO performed and demonstrates- Results for orders placed during the hospital encounter of 11/10/23    Echo    Interpretation Summary    Left Ventricle: The left ventricle is severely dilated. Mildly increased wall thickness. There is mild concentric hypertrophy. Severe global hypokinesis present. There is severely reduced systolic function with a visually estimated ejection fraction of 10 -15%. Grade III diastolic dysfunction.    Right Ventricle: Severe right ventricular enlargement. Systolic function is severely reduced.    Mitral Valve: There is no stenosis. There is mild to moderate regurgitation with a centrally directed jet.    Tricuspid Valve: There is mild  to moderate regurgitation.    Pulmonary Artery: The estimated pulmonary artery systolic pressure is 67 mmHg.  .Last BNP reviewed- and noted below   Recent Labs   Lab 01/07/24  0755   BNP 2,503*         Presented for acute on chronic SOB with associated low UOP. Afebrile HDS. BNP 3,067, Troponin 0.031. CXR with cardiomegaly, vascular congestion, and edema. Received 100 of Lasix in the ED.    - Lasix gtt increased to 20, dobutamine gtt  - Cardiology following  - RIP positive for Covid, treatment completed.  - continue home Jardiance   - Restart spironolactone at 25mg qd  - Restart BB as tolerated  - Documented allergy to ACE/ARBs  - Cardiac diet with Fluid restriction at 1.5L with strict I/Os and daily STANDING weights  - Check Electrolytes, keep Mag >2 & K+ >4  - SCDs, Ambulate as tolerated    - Strict I&Os, Daily standing weights  - Review Low-salt diet and enforce medication adherence on discharge    Acute renal failure superimposed on stage 3a chronic kidney disease  Creatinine 2.1 on admit, baseline around 1.4. Likely prerenal etiology given urine sodium and FENa vs progression of CKD. Improving with increased diuresis     Recent Labs     01/10/24  1340 01/10/24  1835 01/11/24  0519   BUN 40* 42* 43*   CREATININE 1.3 1.3 1.3         Estimated Creatinine Clearance: 68.5 mL/min (based on SCr of 1.3 mg/dL).  RESOLVED    - Renal US: renal cysts, no hydronephrosis  - Urine Na: 14, Pr/Cr: 0.72; FENa 0.2%  - Monitor urine output and serial BMP and adjust therapy as needed.   - Strict I&Os and daily weights   - Avoid nephrotoxic agents such as NSAIDs, gadolinium and IV radiocontrast.  - Renally dose meds to current GFR.  - Maintain MAP > 65.  - Nephrology referral outpatient    AMELIA (obstructive sleep apnea)  Carried diagnosis of AMELIA per chart, however he does not sleep with CPAP at home and declines while here      Peripheral vascular disease, unspecified  Patient with chronic lower leg wounds that recently established  with wound care. He is set to follow up with vascular surgery outpatient at the end of February.    Appreciate wound care recommendations  Continue diuresis       NICM (nonischemic cardiomyopathy)  ASA 325mg qd per home medication list. Reason for this dose unclear.      Biventricular ICD (implantable cardioverter-defibrillator) in place  Stable on admission, no acute issues, he denies discharge. QTc chronically prolonged. Continue home amiodarone for NSVT prevention. Monitor on telemetry     - 12/31, noted to have AF RVR with hypotension. Started on amio gtt. Per report, pacemaker malfunctioning.  - Restarted on home oral amio  - Device interrogated, no complications. EP has signed off  - Consider deactivating in light of new DNR status, will discuss with patient    Hyperlipidemia  Stable. Continue Home Pravastatin.        Acute hypoxemic respiratory failure  Patient with Hypoxic Respiratory failure which is Acute.  he is not on home oxygen. Supplemental oxygen was provided and noted-      Patient with persistent O2 requirement of 2-3 LPM. Largest contribution likely due to fluid overload in setting of heart failure exacerbation and possible urinary retention. Also found to be COVID positive, s/p course of remdesivir. With his persistent hypoxia, possible that he is developing a post viral pneumonia, or an aspiration pneumonia in setting of dysphagia.   Troponin and repeat EKG WNL, concern for ACS low.   He does have baseline arrhythmia with PM in place, recently interrogated. No AS on recent TTE.    - Continue lasix gtt (see acute on chronic HF) and dobutamine gtt  - Adding GDMT as tolerated  - Wean O2 as tolerated  - BNP repeated and still elevated at 2503.  - Consulting cardiology for failure to improve despite diuresis  - PT/OT consulted    Essential hypertension  Chronic, controlled. Latest blood pressure and vitals reviewed-     Temp:  [97.4 °F (36.3 °C)-98.2 °F (36.8 °C)]   Pulse:  [75-91]   Resp:  [16-18]    BP: (86-99)/(56-64)   SpO2:  [95 %-98 %] .   Home meds for hypertension were reviewed and noted below.   Hypertension Medications               furosemide (LASIX) 80 MG tablet TAKE 1 TABLET EVERY DAY    metoprolol succinate (TOPROL-XL) 50 MG 24 hr tablet TAKE 1 TABLET EVERY DAY    spironolactone (ALDACTONE) 25 MG tablet TAKE 1/2 TABLET (12.5 MG TOTAL) ONCE DAILY. HOLD IF SYSTOLIC BLOOD PRESSURE IS LESS THAN 110            While in the hospital, will manage blood pressure as follows; Adjust home antihypertensive regimen as follows- Holding in setting of borderline pressures in setting of new diuresis     Will utilize p.r.n. blood pressure medication only if patient's blood pressure greater than 180/110 and he develops symptoms such as worsening chest pain or shortness of breath.      VTE Risk Mitigation (From admission, onward)           Ordered     heparin (porcine) injection 5,000 Units  Every 8 hours         12/29/23 1759     IP VTE HIGH RISK PATIENT  Once         12/29/23 1759     Place sequential compression device  Until discontinued         12/29/23 1759                    Discharge Planning   MARIIA: 1/12/2024     Code Status: Full Code   Is the patient medically ready for discharge?: No    Reason for patient still in hospital (select all that apply): Treatment  Discharge Plan A: Home with family                  Deepa Holly MD  Department of Hospital Medicine   [facility] - Cardiology Stepdown

## 2024-01-11 NOTE — PROGRESS NOTES
[facility] - Cardiology Stepdown  Palliative Medicine  Progress Note    Patient Name: Papito Bhakta  MRN: [id number]  Admission Date: 12/29/2023  Hospital Length of Stay: 9 days  Code Status: Full Code   Attending Provider: Cayetano Somers MD  Consulting Provider: Massiel Wiggins MD  Primary Care Physician: Brynn To MD  Principal Problem:Serum total bilirubin elevated    Patient information was obtained from patient and primary team.      Assessment/Plan:     Palliative Care  Encounter for palliative care  Papito Bhakta is a 68-year-old man with a history of end stage NICM (EF 10-15%) s/p ICD, NYHA class IV who was admitted for severe heart failure exacerbation, solid-food dysphagia and found to be incidentally COVID positive. During this admission, he was started on long-term dobutamine. Palliative and Supportive Care was consulted to explore goals of care.    Advance Care Planning  Goals of Care  - Code status: reversed to full code on 1/11/24  - Next of kin: two adult children, Annie (daughter) and Papito Draper (son)   - Lives with daughter Annie  - ACP documents: has [place], but will need updating to reflect DNR code status  - Patient has decision making capacity  - Prognosis: poor, NYHA class IV for end-stage heart failure  - Goals: spend more time at home, improvement in condition, would want a peaceful end of life  - Plans: agreed to continue current plan of care (dysphagia work up, continued diuresis). Would benefit from transparent expectations of what work-up would mean but also the realistic potential to find a curative solution for solid-food dysphagia    Goals of Care Conversation:  - 1/11/24: I met with Mr. Bhakta in his room alone, who is receptive of our encounter. Per primary team he had changed his code status to full code with the worries that without this full code status, care would be withheld from him and he desires full supportive care. He tells me that his daughter Annie is wanting him to  "stay in the hospital longer, hoping he will get better before he comes back home. I asked him if living with his son Papito Draper would be a possibility and he tells me "it wouldn't be the same" and he worries that he wouldn't get the care from his son like he was getting from his daughter. He sincerely wants to be able to live the rest of his days at home in peace, and thinks he needs to have another talk with Annie about this as she is not accepting that he is nearing his end of life. He shared that in 2019, he was on the ventilator and thankfully survived that hosptialization. But now he is in a different place with a different level of heart function, and is coming to terms that things are different now. He didn't understand what the plan of care was. I explained that the doctors told me that he came in with two major problems, and the threshold for discharge involves identifying whether these problems have solutions or will be chronic with no solutions before he is discharged. I told him that his two major problems was end stage heart failure exacerbation and solid-food dysphagia. Explained that's why he's on dobutamine and lasix drips to try to alleviate the fluid burden from his heart and lungs. Explained that his hospitalist doctors engaged the GI doctors to evaluate his upper GI tract with an endoscopy procedure, but this would require him to lay flat, which he cannot do right now due to the fluid overload. This is why he's getting aggressive diuresis. He expressed understanding. I shared that the endoscopy is an important test for the doctors with the best chance to find out what is going on, but this doesn't mean that it guarantees us a diagnosis or a solution. Discussed that this is truly the most helpful test, but set expectations that it doesn't guarantee answers that we seek. He nodded in understanding. He expressed gratefulness for this explanation.  - 1/10/24: I met Mr. Bhakta in his room and he was " "talking on his cell phone with his daughter Annie. Annie was telling him to reconsider and to stay in the hospital in hopes to get better. He said, "Ok, I was thinking only about myself, I'll stay." He finally did hang up the phone and he was open to our encounter. 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 He nodded appreciatively, was appropriately tearful as he lamented that he wished he had done things "better" in the past. He shares that while he has been in the hospital, he feels like he is only getting worse. He also acknowledges that so many of the things that bother him are little in the hospital (like a non-functional catheter, uncomfortable hospital bed that deflates repeatedly, etc) but that "the little things add up" and he would rather be home. He is appropriately tearful as we talked about his end-stage heart failure, noting that he is on dobutamine which is a medicine that is artificially pumping his heart because his heart is too weak to do this on its own. I shared that dobutamine is by no means perfect, and will one day stop working in the future. We did talk about code status and I recommended that when the dobutamine stops working and his heart stops, that we allow him a peaceful, natural death and protect him from chest compressions/CPR which will not work. He was surprised by this recommendation when I told him that this is a DNR code status. I acknowledged that in the past, he was full code and it was appropriate because his heart was stronger. Now we are in a different place, and know that if even the current life saving measures he's on right now (dobutamine) stops working, it is a reflection that his heart is dying and that nothing more will actually help him. I shared that by performing CPR, we " will knowingly inflict more pain/suffering without meaningful benefit. Acknowledged that he is a sukhdev human being, and it is our job to be thoughtful about how we help one another and also protect one another from things that will be harmful. I shared that often in the procedure room, DNR code statuses are reversed to full code, and will return back to DNR when he is done with the planned procedure, as the doctors would never send him to a procedure knowing he wouldn't survive. He expressed understanding and verbalized agreement to update his code status to DNR.  - He shared that his friend and his daughter will come later - he thinks they will talk about convincing him to stay a little longer. I shared that this is OK as it is apparent that he makes decisions about his life, thinking about the people he loves. He nods, shared that Annie's mother had  in  from Lupus complications, and this painful experience is carried by their family. He tried to tell Annie that he wouldn't be living forever, but acknowledges that this is a difficult thing to accept. He says he would proceed with the doctor's recommendations. I shared with him that I agreed with him, that it is apparent in the last two weeks, nothing has truly helped him get better. Acknowledged that he came to the hospital with the hope to be able to walk away doing better or feeling better, but that doesn't mean as human beings that we can guarantee that as we are not God. He agreed. I shared my concerns that he will not get better, and during his last chapter, what would he want? He tells me that he wants to be at home. I reminded him that at any point he is realizing that he is wasting his time in the hospital when time is short, he can always tell his doctors that he'd like to focus his sukhdev time with the people he loves and in the comforts of home. He nodded in understanding, stating that he will take this one day at a time.             I will  follow-up with patient. Please contact us if you have any additional questions.    Subjective:     Chief Complaint:   Chief Complaint   Patient presents with    Leg Swelling     Fatigue and worsening lower extremity edema for past couple days, c exertional dyspnea, unable to walk now.       HPI:   Papito Bhakta is a 68-year-old man with a history of end stage NICM (EF 10-15%) s/p ICD, NYHA class IV who was admitted for severe heart failure exacerbation, solid-food dysphagia and found to be incidentally COVID positive. Palliative and Supportive Care was consulted to explore goals of care.    Hospital Course:  No notes on file    Interval History: Supportive conversation with Mr. Bhakta at bedside.    Past Medical History:   Diagnosis Date    RIGOBERTO (acute kidney injury) 10/7/2020    Anticoagulant long-term use     Aspirin    CHF (congestive heart failure)     Hyperlipidemia     Hypertension     Microcytic anemia 12/31/2023    NICM (nonischemic cardiomyopathy) 6/16/2020    Obesity     AMELIA (obstructive sleep apnea) 1/7/2022    Peripheral vascular disease, unspecified 2/1/2021       Past Surgical History:   Procedure Laterality Date    ESOPHAGOGASTRODUODENOSCOPY N/A 1/3/2024    Procedure: EGD (ESOPHAGOGASTRODUODENOSCOPY);  Surgeon: Vaughn Oliver MD;  Location: [address]);  Service: Endoscopy;  Laterality: N/A;    ESOPHAGOGASTRODUODENOSCOPY N/A 1/5/2024    Procedure: EGD (ESOPHAGOGASTRODUODENOSCOPY);  Surgeon: Faith Juares MD;  Location: [address]);  Service: Endoscopy;  Laterality: N/A;    INSERTION OF BIVENTRICULAR IMPLANTABLE CARDIOVERTER-DEFIBRILLATOR (ICD) N/A 02/13/2019    Procedure: INSERTION, ICD, BIVENTRICULAR;  Surgeon: Shailesh Eng MD;  Location: [facility] CATH LAB;  Service: Cardiology;  Laterality: N/A;  RN PRE OP 2-6-19  1ST CASE PER  RAD[facility]. NOTIFIED RADHA THAT ANESTHESIA IS NOT PITO FOR 1ST CASE START-LO    INSERTION OF BIVENTRICULAR IMPLANTABLE CARDIOVERTER-DEFIBRILLATOR (ICD) N/A  09/28/2020    Procedure: INSERTION, ICD, BIVENTRICULAR;  Surgeon: Jim Kwong MD;  Location: [facility] EP LAB;  Service: Cardiology;  Laterality: N/A;  NICM, CRT-D, SJM,, MAC, DM, 3 Prep*Wearing LifeVest*    oral extraction  11/2018    TESTICLE SURGERY         Review of patient's allergies indicates:   Allergen Reactions    Ramipril      Leg swelling and gynecomastia     Losartan Rash       Medications:  Continuous Infusions:   DOBUTamine IV infusion (non-titrating) 2.5 mcg/kg/min (01/11/24 0330)    furosemide (LASIX) 500 mg in 50 mL infusion (conc: 10 mg/mL) 20 mg/hr (01/10/24 2109)     Scheduled Meds:   amiodarone  200 mg Oral Daily    aspirin  325 mg Oral Daily    duke's soln (benadryl 30 mL, mylanta 30 mL, LIDOcaine 30 mL, nystatin 30 mL) 120 mL  10 mL Oral QID    empagliflozin  10 mg Oral Daily    heparin (porcine)  5,000 Units Subcutaneous Q8H    potassium bicarbonate  25 mEq Oral BID    pravastatin  80 mg Oral Daily    senna-docusate 8.6-50 mg  1 tablet Oral Daily    spironolactone  25 mg Oral Daily     PRN Meds:acetaminophen, dextrose 10%, dextrose 10%, glucagon (human recombinant), glucose, glucose, guaiFENesin 100 mg/5 ml, naloxone, sodium chloride 0.9%    Family History       Problem Relation (Age of Onset)    Epilepsy Mother          Tobacco Use    Smoking status: Former     Current packs/day: 0.00     Average packs/day: 0.5 packs/day for 40.0 years (20.0 ttl pk-yrs)     Types: Cigarettes, Cigars     Start date: 1974     Quit date: 2014     Years since quitting: 10.0     Passive exposure: Current    Smokeless tobacco: Never   Substance and Sexual Activity    Alcohol use: Yes     Comment: once a month beer or liquor    Drug use: No    Sexual activity: Not Currently     Birth control/protection: None     Comment: uses protection sometimes       Review of Systems   Constitutional:  Positive for activity change and fatigue.   HENT:  Positive for trouble swallowing.    Cardiovascular:  Positive for leg  swelling.   Neurological:  Positive for weakness.     Objective:     Vital Signs (Most Recent):  Temp: 97.9 °F (36.6 °C) (24 07)  Pulse: 82 (24 1107)  Resp: 16 (24 07)  BP: (!) 87/56 (24 07)  SpO2: 98 % (24) Vital Signs (24h Range):  Temp:  [97.4 °F (36.3 °C)-98.2 °F (36.8 °C)] 97.9 °F (36.6 °C)  Pulse:  [75-91] 82  Resp:  [16-18] 16  SpO2:  [95 %-98 %] 98 %  BP: (86-99)/(56-60) 87/56     Weight: 100.2 kg (220 lb 14.4 oz)  Body mass index is 26.19 kg/m².       Physical Exam  HENT:      Head: Normocephalic and atraumatic.      Right Ear: External ear normal.      Left Ear: External ear normal.      Nose: Nose normal.      Mouth/Throat:      Mouth: Mucous membranes are dry.   Eyes:      General: Scleral icterus present.      Extraocular Movements: Extraocular movements intact.   Cardiovascular:      Rate and Rhythm: Normal rate.   Pulmonary:      Effort: Pulmonary effort is normal.      Breath sounds: Normal breath sounds.   Abdominal:      General: Bowel sounds are normal.      Palpations: Abdomen is soft.   Musculoskeletal:         General: Swelling present.   Lymphadenopathy:      Cervical: No cervical adenopathy.   Skin:     General: Skin is warm and dry.   Neurological:      Mental Status: He is alert and oriented to person, place, and time. Mental status is at baseline.   Psychiatric:         Mood and Affect: Mood normal.         Behavior: Behavior normal.            Review of Symptoms      Symptom Assessment (ESAS 0-10 Scale)  Pain:  0  Dyspnea:  0  Anxiety:  0  Nausea:  0  Depression:  0  Anorexia:  0  Fatigue:  0  Insomnia:  0  Restlessness:  0  Agitation:  0         Psychosocial/Cultural:   See Palliative Psychosocial Note: No  Lives with daughter Annie. , wife  from Lupus in . Has son named Papito Allan Also lives with 9-year-old grandson, and has other grandchildren and great-grandchild.  **Primary  to Follow**  Palliative Care   Consult: No    Spiritual:  F - Taylor and Belief:  Bahai  I - Importance:  Important  C - Community:  N/A  A - Address in Care:  Engage spiritual care        Advance Care Planning  Advance Directives:   Living Will: Yes    [place]POST: Yes    Do Not Resuscitate Status: Yes    Medical Power of : No      Decision Making:  Patient answered questions  Goals of Care: What is most important right now is to focus on spending time at home, improvement in condition but with limits to invasive therapies. Accordingly, we have decided that the best plan to meet the patient's goals includes continuing with treatment.         Significant Labs: CBC:   Recent Labs   Lab 01/10/24  1338 01/11/24 0519   WBC 9.66 7.73   HGB 12.6* 12.1*   HCT 37.4* 35.3*   * 103*       CMP:   Recent Labs   Lab 01/10/24  1340 01/10/24  1835 01/11/24 0519   * 135* 138   K 3.7 3.7 3.6   CL 93* 95 96   CO2 33* 29 32*   GLU 99 138* 104   BUN 40* 42* 43*   CREATININE 1.3 1.3 1.3   CALCIUM 9.1 9.1 8.9   PROT 6.4  --  6.3   ALBUMIN 2.3*  --  2.2*   BILITOT 10.3*  --  9.0*   ALKPHOS 105  --  99   AST 35  --  35   ALT 30  --  28   ANIONGAP 9 11 10       CBC:   Recent Labs   Lab 01/11/24 0519   WBC 7.73   HGB 12.1*   HCT 35.3*   MCV 69*   *       BMP:  Recent Labs   Lab 01/11/24 0519         K 3.6   CL 96   CO2 32*   BUN 43*   CREATININE 1.3   CALCIUM 8.9   MG 2.3       LFT:  Lab Results   Component Value Date    AST 35 01/11/2024    GGT 55 01/06/2024    ALKPHOS 99 01/11/2024    BILITOT 9.0 (H) 01/11/2024     Albumin:   Albumin   Date Value Ref Range Status   01/11/2024 2.2 (L) 3.5 - 5.2 g/dL Final     Protein:   Total Protein   Date Value Ref Range Status   01/11/2024 6.3 6.0 - 8.4 g/dL Final     Lactic acid:   Lab Results   Component Value Date    LACTATE 2.1 01/07/2024    LACTATE 2.3 (H) 01/01/2024       Significant Imaging: I have reviewed all pertinent imaging results/findings within the past 24 hours.    I spent a  total of 50 minutes on the day of the visit. This includes face to face time in discussion of goals of care, symptom assessment, coordination of care and emotional support. This also includes non-face to face time preparing to see the patient (eg, review of tests/imaging), obtaining and/or reviewing separately obtained history, documenting clinical information in the electronic or other health record, independently interpreting results and communicating results to the patient/family/caregiver, or care coordinator.    Massiel Wiggins MD  Palliative Medicine  [facility] - Cardiology Stepdown

## 2024-01-11 NOTE — ASSESSMENT & PLAN NOTE
Creatinine 2.1 on admit, baseline around 1.4. Likely prerenal etiology given urine sodium and FENa vs progression of CKD. Improving with increased diuresis     Recent Labs     01/10/24  1340 01/10/24  1835 01/11/24  0519   BUN 40* 42* 43*   CREATININE 1.3 1.3 1.3         Estimated Creatinine Clearance: 68.5 mL/min (based on SCr of 1.3 mg/dL).  RESOLVED    - Renal US: renal cysts, no hydronephrosis  - Urine Na: 14, Pr/Cr: 0.72; FENa 0.2%  - Monitor urine output and serial BMP and adjust therapy as needed.   - Strict I&Os and daily weights   - Avoid nephrotoxic agents such as NSAIDs, gadolinium and IV radiocontrast.  - Renally dose meds to current GFR.  - Maintain MAP > 65.  - Nephrology referral outpatient

## 2024-01-11 NOTE — SUBJECTIVE & OBJECTIVE
Interval History: A couple runs of NSVT overnight. Soft BPs. Afebrile and on RA. Interested in PT/OT to improve strength.    Objective:     Vital Signs (Most Recent):  Temp: 97.9 °F (36.6 °C) (01/11/24 0738)  Pulse: 77 (01/11/24 0738)  Resp: 16 (01/11/24 0738)  BP: (!) 87/56 (01/11/24 0738)  SpO2: 98 % (01/11/24 0738) Vital Signs (24h Range):  Temp:  [97.4 °F (36.3 °C)-98.2 °F (36.8 °C)] 97.9 °F (36.6 °C)  Pulse:  [75-91] 77  Resp:  [16-18] 16  SpO2:  [95 %-98 %] 98 %  BP: (86-99)/(56-67) 87/56     Weight: 100.2 kg (220 lb 14.4 oz)  Body mass index is 26.19 kg/m².    Intake/Output Summary (Last 24 hours) at 1/11/2024 0859  Last data filed at 1/11/2024 0529  Gross per 24 hour   Intake 120 ml   Output 2100 ml   Net -1980 ml         Physical Exam  Vitals and nursing note reviewed.   Constitutional:       Appearance: He is ill-appearing.   HENT:      Head: Normocephalic and atraumatic.      Mouth/Throat:      Mouth: Mucous membranes are dry.   Eyes:      General: Scleral icterus present.      Extraocular Movements: Extraocular movements intact.      Comments: Anisocoria   Neck:      Vascular: JVD (to the ear) present.   Cardiovascular:      Rate and Rhythm: Normal rate and regular rhythm.      Heart sounds:      Gallop present.   Pulmonary:      Effort: Pulmonary effort is normal. No respiratory distress.      Breath sounds: Wheezing present.   Abdominal:      General: Abdomen is flat. There is no distension.      Palpations: Abdomen is soft.      Tenderness: There is no abdominal tenderness.   Musculoskeletal:         General: Tenderness (b/l LE) present.      Right lower leg: Edema present.      Left lower leg: Edema present.   Skin:     General: Skin is warm and dry.   Neurological:      General: No focal deficit present.      Mental Status: He is alert and oriented to person, place, and time.      Motor: Weakness present.   Psychiatric:         Mood and Affect: Mood normal.         Behavior: Behavior normal.              Significant Labs: All pertinent labs within the past 24 hours have been reviewed.    Significant Imaging: I have reviewed all pertinent imaging results/findings within the past 24 hours.

## 2024-01-11 NOTE — ASSESSMENT & PLAN NOTE
"Papito Bhakta is a 68-year-old man with a history of end stage NICM (EF 10-15%) s/p ICD, NYHA class IV who was admitted for severe heart failure exacerbation, solid-food dysphagia and found to be incidentally COVID positive. During this admission, he was started on long-term dobutamine. Palliative and Supportive Care was consulted to explore goals of care.    Advance Care Planning   Goals of Care  - Code status: reversed to full code on 1/11/24  - Next of kin: two adult children, Annie (daughter) and Papito Draper (son)   - Lives with daughter Annie  - ACP documents: has [place], but will need updating to reflect DNR code status  - Patient has decision making capacity  - Prognosis: poor, NYHA class IV for end-stage heart failure  - Goals: spend more time at home, improvement in condition, would want a peaceful end of life  - Plans: agreed to continue current plan of care (dysphagia work up, continued diuresis). Would benefit from transparent expectations of what work-up would mean but also the realistic potential to find a curative solution for solid-food dysphagia    Goals of Care Conversation:  - 1/11/24: I met with Mr. Bhakta in his room alone, who is receptive of our encounter. Per primary team he had changed his code status to full code with the worries that without this full code status, care would be withheld from him and he desires full supportive care. He tells me that his daughter Annie is wanting him to stay in the hospital longer, hoping he will get better before he comes back home. I asked him if living with his son Papito Draper would be a possibility and he tells me "it wouldn't be the same" and he worries that he wouldn't get the care from his son like he was getting from his daughter. He sincerely wants to be able to live the rest of his days at home in peace, and thinks he needs to have another talk with Annie about this as she is not accepting that he is nearing his end of life. He shared that in 2019, he was " "on the ventilator and thankfully survived that hosptialization. But now he is in a different place with a different level of heart function, and is coming to terms that things are different now. He didn't understand what the plan of care was. I explained that the doctors told me that he came in with two major problems, and the threshold for discharge involves identifying whether these problems have solutions or will be chronic with no solutions before he is discharged. I told him that his two major problems was end stage heart failure exacerbation and solid-food dysphagia. Explained that's why he's on dobutamine and lasix drips to try to alleviate the fluid burden from his heart and lungs. Explained that his hospitalist doctors engaged the GI doctors to evaluate his upper GI tract with an endoscopy procedure, but this would require him to lay flat, which he cannot do right now due to the fluid overload. This is why he's getting aggressive diuresis. He expressed understanding. I shared that the endoscopy is an important test for the doctors with the best chance to find out what is going on, but this doesn't mean that it guarantees us a diagnosis or a solution. Discussed that this is truly the most helpful test, but set expectations that it doesn't guarantee answers that we seek. He nodded in understanding. He expressed gratefulness for this explanation.  - 1/10/24: I met Mr. Bhakta in his room and he was talking on his cell phone with his daughter Annie. Annie was telling him to reconsider and to stay in the hospital in hopes to get better. He said, "Ok, I was thinking only about myself, I'll stay." He finally did hang up the phone and he was open to our encounter. 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 He nodded " "appreciatively, was appropriately tearful as he lamented that he wished he had done things "better" in the past. He shares that while he has been in the hospital, he feels like he is only getting worse. He also acknowledges that so many of the things that bother him are little in the hospital (like a non-functional catheter, uncomfortable hospital bed that deflates repeatedly, etc) but that "the little things add up" and he would rather be home. He is appropriately tearful as we talked about his end-stage heart failure, noting that he is on dobutamine which is a medicine that is artificially pumping his heart because his heart is too weak to do this on its own. I shared that dobutamine is by no means perfect, and will one day stop working in the future. We did talk about code status and I recommended that when the dobutamine stops working and his heart stops, that we allow him a peaceful, natural death and protect him from chest compressions/CPR which will not work. He was surprised by this recommendation when I told him that this is a DNR code status. I acknowledged that in the past, he was full code and it was appropriate because his heart was stronger. Now we are in a different place, and know that if even the current life saving measures he's on right now (dobutamine) stops working, it is a reflection that his heart is dying and that nothing more will actually help him. I shared that by performing CPR, we will knowingly inflict more pain/suffering without meaningful benefit. Acknowledged that he is a sukhdev human being, and it is our job to be thoughtful about how we help one another and also protect one another from things that will be harmful. I shared that often in the procedure room, DNR code statuses are reversed to full code, and will return back to DNR when he is done with the planned procedure, as the doctors would never send him to a procedure knowing he wouldn't survive. He expressed understanding " and verbalized agreement to update his code status to DNR.  - He shared that his friend and his daughter will come later - he thinks they will talk about convincing him to stay a little longer. I shared that this is OK as it is apparent that he makes decisions about his life, thinking about the people he loves. He nods, shared that Annie's mother had  in  from Lupus complications, and this painful experience is carried by their family. He tried to tell Annie that he wouldn't be living forever, but acknowledges that this is a difficult thing to accept. He says he would proceed with the doctor's recommendations. I shared with him that I agreed with him, that it is apparent in the last two weeks, nothing has truly helped him get better. Acknowledged that he came to the hospital with the hope to be able to walk away doing better or feeling better, but that doesn't mean as human beings that we can guarantee that as we are not God. He agreed. I shared my concerns that he will not get better, and during his last chapter, what would he want? He tells me that he wants to be at home. I reminded him that at any point he is realizing that he is wasting his time in the hospital when time is short, he can always tell his doctors that he'd like to focus his sukhdev time with the people he loves and in the comforts of home. He nodded in understanding, stating that he will take this one day at a time.

## 2024-01-11 NOTE — PROGRESS NOTES
MD Capone made aware of patients midnights BP being 86/58 (66). Patient is currently asymptomatic. No new orders. Will continue to monitor.

## 2024-01-11 NOTE — PLAN OF CARE
Problem: Adult Inpatient Plan of Care  Goal: Plan of Care Review  1/11/2024 0316 by Shira Valadez RN  Outcome: Ongoing, Progressing  1/11/2024 0315 by Shira Valadez RN  Outcome: Ongoing, Progressing  Goal: Patient-Specific Goal (Individualized)  1/11/2024 0316 by Shira Valadez RN  Outcome: Ongoing, Progressing  1/11/2024 0315 by Shira Valadez RN  Outcome: Ongoing, Progressing  Goal: Absence of Hospital-Acquired Illness or Injury  1/11/2024 0316 by Shira Valadez RN  Outcome: Ongoing, Progressing  1/11/2024 0315 by Shira Valadez RN  Outcome: Ongoing, Progressing  Goal: Optimal Comfort and Wellbeing  1/11/2024 0316 by Shira Valadez RN  Outcome: Ongoing, Progressing  1/11/2024 0315 by Shira Valadez RN  Outcome: Ongoing, Progressing  Goal: Readiness for Transition of Care  1/11/2024 0316 by Shira Valadez RN  Outcome: Ongoing, Progressing  1/11/2024 0315 by Shira Valadez RN  Outcome: Ongoing, Progressing     Problem: Fluid and Electrolyte Imbalance (Acute Kidney Injury/Impairment)  Goal: Fluid and Electrolyte Balance  1/11/2024 0316 by Shira Valadez RN  Outcome: Ongoing, Progressing  1/11/2024 0315 by Shira Valadez RN  Outcome: Ongoing, Progressing     Problem: Oral Intake Inadequate (Acute Kidney Injury/Impairment)  Goal: Optimal Nutrition Intake  Outcome: Ongoing, Progressing     Problem: Renal Function Impairment (Acute Kidney Injury/Impairment)  Goal: Effective Renal Function  Outcome: Ongoing, Progressing     Problem: Impaired Wound Healing  Goal: Optimal Wound Healing  Outcome: Ongoing, Progressing     Problem: Adjustment to Illness (Heart Failure)  Goal: Optimal Coping  Outcome: Ongoing, Progressing     Problem: Cardiac Output Decreased (Heart Failure)  Goal: Optimal Cardiac Output  Outcome: Ongoing, Progressing     Problem: Dysrhythmia (Heart Failure)  Goal: Stable Heart Rate and Rhythm  Outcome: Ongoing, Progressing     Problem: Fluid Imbalance (Heart Failure)  Goal: Fluid  Balance  Outcome: Ongoing, Progressing     Problem: Functional Ability Impaired (Heart Failure)  Goal: Optimal Functional Ability  Outcome: Ongoing, Progressing     Problem: Oral Intake Inadequate (Heart Failure)  Goal: Optimal Nutrition Intake  Outcome: Ongoing, Progressing     Problem: Respiratory Compromise (Heart Failure)  Goal: Effective Oxygenation and Ventilation  Outcome: Ongoing, Progressing     Problem: Sleep Disordered Breathing (Heart Failure)  Goal: Effective Breathing Pattern During Sleep  Outcome: Ongoing, Progressing     Problem: Skin Injury Risk Increased  Goal: Skin Health and Integrity  Outcome: Ongoing, Progressing     Problem: Infection  Goal: Absence of Infection Signs and Symptoms  Outcome: Ongoing, Progressing     Problem: Coping Ineffective  Goal: Effective Coping  Outcome: Ongoing, Progressing

## 2024-01-11 NOTE — ACP (ADVANCE CARE PLANNING)
Advance Care Planning     Date: 01/11/2024    Code Status  In light of the patients advanced and life limiting illness,I engaged the the patient in a voluntary conversation about the patient's preferences for care  at the very end of life. The patient wishes to do all life-prolonging measures.   Along those lines, the patient does  wish to have CPR or other invasive treatments performed when his heart and/or breathing stops. Requested to reverse his code status. I communicated to the patient that his code status would be changed back to FULL CODE in his medical record to reflect this preference.  I spent a total of 20 minutes engaging the patient in this advance care planning discussion.

## 2024-01-11 NOTE — ASSESSMENT & PLAN NOTE
"1/10, patient stating desire to go home. He is tired of his prolonged hospital course and feeling defeated at the slow trajectory of his remaining course. States he wants to be comfortable at home, "if it's my time it's my time". We discussed benefits of staying, include improved quality of life if we are able to medically optimize  Palliative care consulted, greatly appreciate assistance. With this conversation, along with conversations with family, he has agreed to stay for now. Will continue to engage in GOC conversations.     DNR order placed per palliative  Need to assess utility of ICD    1/11, patient now stating that he would like all interventions done if needed and does not want ICD turned off  Order placed to make patient Full Code     "

## 2024-01-11 NOTE — ASSESSMENT & PLAN NOTE
Creatine stable for now. BMP reviewed- noted Estimated Creatinine Clearance: 68.5 mL/min (based on SCr of 1.3 mg/dL). according to latest data. Based on current GFR, CKD stage is stage 3 - GFR 30-59.  Monitor UOP and serial BMP and adjust therapy as needed. Renally dose meds. Avoid nephrotoxic medications and procedures.

## 2024-01-11 NOTE — SUBJECTIVE & OBJECTIVE
Interval History: Supportive conversation with Mr. Bhakta at bedside.    Past Medical History:   Diagnosis Date    RIGOBERTO (acute kidney injury) 10/7/2020    Anticoagulant long-term use     Aspirin    CHF (congestive heart failure)     Hyperlipidemia     Hypertension     Microcytic anemia 12/31/2023    NICM (nonischemic cardiomyopathy) 6/16/2020    Obesity     AMELIA (obstructive sleep apnea) 1/7/2022    Peripheral vascular disease, unspecified 2/1/2021       Past Surgical History:   Procedure Laterality Date    ESOPHAGOGASTRODUODENOSCOPY N/A 1/3/2024    Procedure: EGD (ESOPHAGOGASTRODUODENOSCOPY);  Surgeon: Vaughn Oliver MD;  Location: [address]);  Service: Endoscopy;  Laterality: N/A;    ESOPHAGOGASTRODUODENOSCOPY N/A 1/5/2024    Procedure: EGD (ESOPHAGOGASTRODUODENOSCOPY);  Surgeon: Faith Juares MD;  Location: [facility] ENDO [address]);  Service: Endoscopy;  Laterality: N/A;    INSERTION OF BIVENTRICULAR IMPLANTABLE CARDIOVERTER-DEFIBRILLATOR (ICD) N/A 02/13/2019    Procedure: INSERTION, ICD, BIVENTRICULAR;  Surgeon: Shailesh Eng MD;  Location: [facility] CATH LAB;  Service: Cardiology;  Laterality: N/A;  RN PRE OP 2-6-19  1ST CASE PER  RADHA. NOTIFIED RADHA THAT ANESTHESIA IS NOT PITO FOR 1ST CASE START-LO    INSERTION OF BIVENTRICULAR IMPLANTABLE CARDIOVERTER-DEFIBRILLATOR (ICD) N/A 09/28/2020    Procedure: INSERTION, ICD, BIVENTRICULAR;  Surgeon: Jim Kwong MD;  Location: [facility] EP LAB;  Service: Cardiology;  Laterality: N/A;  NICM, CRT-D, SJM,, MAC, DM, 3 Prep*Wearing LifeVest*    oral extraction  11/2018    TESTICLE SURGERY         Review of patient's allergies indicates:   Allergen Reactions    Ramipril      Leg swelling and gynecomastia     Losartan Rash       Medications:  Continuous Infusions:   DOBUTamine IV infusion (non-titrating) 2.5 mcg/kg/min (01/11/24 0330)    furosemide (LASIX) 500 mg in 50 mL infusion (conc: 10 mg/mL) 20 mg/hr (01/10/24 2109)     Scheduled Meds:   amiodarone  200 mg Oral  Daily    aspirin  325 mg Oral Daily    duke's soln (benadryl 30 mL, mylanta 30 mL, LIDOcaine 30 mL, nystatin 30 mL) 120 mL  10 mL Oral QID    empagliflozin  10 mg Oral Daily    heparin (porcine)  5,000 Units Subcutaneous Q8H    potassium bicarbonate  25 mEq Oral BID    pravastatin  80 mg Oral Daily    senna-docusate 8.6-50 mg  1 tablet Oral Daily    spironolactone  25 mg Oral Daily     PRN Meds:acetaminophen, dextrose 10%, dextrose 10%, glucagon (human recombinant), glucose, glucose, guaiFENesin 100 mg/5 ml, naloxone, sodium chloride 0.9%    Family History       Problem Relation (Age of Onset)    Epilepsy Mother          Tobacco Use    Smoking status: Former     Current packs/day: 0.00     Average packs/day: 0.5 packs/day for 40.0 years (20.0 ttl pk-yrs)     Types: Cigarettes, Cigars     Start date: 1974     Quit date: 2014     Years since quitting: 10.0     Passive exposure: Current    Smokeless tobacco: Never   Substance and Sexual Activity    Alcohol use: Yes     Comment: once a month beer or liquor    Drug use: No    Sexual activity: Not Currently     Birth control/protection: None     Comment: uses protection sometimes       Review of Systems   Constitutional:  Positive for activity change and fatigue.   HENT:  Positive for trouble swallowing.    Cardiovascular:  Positive for leg swelling.   Neurological:  Positive for weakness.     Objective:     Vital Signs (Most Recent):  Temp: 97.9 °F (36.6 °C) (01/11/24 0738)  Pulse: 82 (01/11/24 1107)  Resp: 16 (01/11/24 0738)  BP: (!) 87/56 (01/11/24 0738)  SpO2: 98 % (01/11/24 0738) Vital Signs (24h Range):  Temp:  [97.4 °F (36.3 °C)-98.2 °F (36.8 °C)] 97.9 °F (36.6 °C)  Pulse:  [75-91] 82  Resp:  [16-18] 16  SpO2:  [95 %-98 %] 98 %  BP: (86-99)/(56-60) 87/56     Weight: 100.2 kg (220 lb 14.4 oz)  Body mass index is 26.19 kg/m².       Physical Exam  HENT:      Head: Normocephalic and atraumatic.      Right Ear: External ear normal.      Left Ear: External ear normal.       Nose: Nose normal.      Mouth/Throat:      Mouth: Mucous membranes are dry.   Eyes:      General: Scleral icterus present.      Extraocular Movements: Extraocular movements intact.   Cardiovascular:      Rate and Rhythm: Normal rate.   Pulmonary:      Effort: Pulmonary effort is normal.      Breath sounds: Normal breath sounds.   Abdominal:      General: Bowel sounds are normal.      Palpations: Abdomen is soft.   Musculoskeletal:         General: Swelling present.   Lymphadenopathy:      Cervical: No cervical adenopathy.   Skin:     General: Skin is warm and dry.   Neurological:      Mental Status: He is alert and oriented to person, place, and time. Mental status is at baseline.   Psychiatric:         Mood and Affect: Mood normal.         Behavior: Behavior normal.            Review of Symptoms      Symptom Assessment (ESAS 0-10 Scale)  Pain:  0  Dyspnea:  0  Anxiety:  0  Nausea:  0  Depression:  0  Anorexia:  0  Fatigue:  0  Insomnia:  0  Restlessness:  0  Agitation:  0         Psychosocial/Cultural:   See Palliative Psychosocial Note: No  Lives with daughter Annie. , wife  from [place] in . Has son named Papito Allan Also lives with 9-year-old grandson, and has other grandchildren and great-grandchild.  **Primary  to Follow**  Palliative Care  Consult: No    Spiritual:  F - [place] and Belief:  Catholic  I - Importance:  Important  C - Community:  N/A  A - Address in Care:  Engage spiritual care        Advance Care Planning   Advance Directives:   Living Will: Yes    [place]POST: Yes    Do Not Resuscitate Status: Yes    Medical Power of : No      Decision Making:  Patient answered questions  Goals of Care: What is most important right now is to focus on spending time at home, improvement in condition but with limits to invasive therapies. Accordingly, we have decided that the best plan to meet the patient's goals includes continuing with treatment.         Significant Labs:  CBC:   Recent Labs   Lab 01/10/24  1338 01/11/24  0519   WBC 9.66 7.73   HGB 12.6* 12.1*   HCT 37.4* 35.3*   * 103*       CMP:   Recent Labs   Lab 01/10/24  1340 01/10/24  1835 01/11/24  0519   * 135* 138   K 3.7 3.7 3.6   CL 93* 95 96   CO2 33* 29 32*   GLU 99 138* 104   BUN 40* 42* 43*   CREATININE 1.3 1.3 1.3   CALCIUM 9.1 9.1 8.9   PROT 6.4  --  6.3   ALBUMIN 2.3*  --  2.2*   BILITOT 10.3*  --  9.0*   ALKPHOS 105  --  99   AST 35  --  35   ALT 30  --  28   ANIONGAP 9 11 10       CBC:   Recent Labs   Lab 01/11/24  0519   WBC 7.73   HGB 12.1*   HCT 35.3*   MCV 69*   *       BMP:  Recent Labs   Lab 01/11/24  0519         K 3.6   CL 96   CO2 32*   BUN 43*   CREATININE 1.3   CALCIUM 8.9   MG 2.3       LFT:  Lab Results   Component Value Date    AST 35 01/11/2024    GGT 55 01/06/2024    ALKPHOS 99 01/11/2024    BILITOT 9.0 (H) 01/11/2024     Albumin:   Albumin   Date Value Ref Range Status   01/11/2024 2.2 (L) 3.5 - 5.2 g/dL Final     Protein:   Total Protein   Date Value Ref Range Status   01/11/2024 6.3 6.0 - 8.4 g/dL Final     Lactic acid:   Lab Results   Component Value Date    LACTATE 2.1 01/07/2024    LACTATE 2.3 (H) 01/01/2024       Significant Imaging: I have reviewed all pertinent imaging results/findings within the past 24 hours.

## 2024-01-11 NOTE — ASSESSMENT & PLAN NOTE
Chronic, controlled. Latest blood pressure and vitals reviewed-     Temp:  [97.4 °F (36.3 °C)-98.2 °F (36.8 °C)]   Pulse:  [75-91]   Resp:  [16-18]   BP: (86-99)/(56-64)   SpO2:  [95 %-98 %] .   Home meds for hypertension were reviewed and noted below.   Hypertension Medications               furosemide (LASIX) 80 MG tablet TAKE 1 TABLET EVERY DAY    metoprolol succinate (TOPROL-XL) 50 MG 24 hr tablet TAKE 1 TABLET EVERY DAY    spironolactone (ALDACTONE) 25 MG tablet TAKE 1/2 TABLET (12.5 MG TOTAL) ONCE DAILY. HOLD IF SYSTOLIC BLOOD PRESSURE IS LESS THAN 110            While in the hospital, will manage blood pressure as follows; Adjust home antihypertensive regimen as follows- Holding in setting of borderline pressures in setting of new diuresis     Will utilize p.r.n. blood pressure medication only if patient's blood pressure greater than 180/110 and he develops symptoms such as worsening chest pain or shortness of breath.

## 2024-01-11 NOTE — PLAN OF CARE
01/11/24 1639   Discharge Reassessment   Assessment Type Discharge Planning Reassessment   Did the patient's condition or plan change since previous assessment? No   Discharge Plan discussed with: Patient   Communicated MARIIA with patient/caregiver Date not available/Unable to determine   Discharge Plan A Home with family;Home Health   Discharge Plan B Home with family   DME Needed Upon Discharge  other (see comments)  (TBD)   Transition of Care Barriers None   Why the patient remains in the hospital Requires continued medical care     Patient remains with continued medical care . Remains on airborne and contact and droplet isolation. Remains on iv dobutamine and IV lasix.     Epifanio Licona RN    [phone]      Discharge Plan A and Plan B have been determined by review of patient's clinical status, future medical and therapeutic needs, and coverage/benefits for post-acute care in coordination with multidisciplinary team members.

## 2024-01-12 LAB
ALBUMIN SERPL BCP-MCNC: 2.2 G/DL (ref 3.5–5.2)
ALP SERPL-CCNC: 102 U/L (ref 55–135)
ALT SERPL W/O P-5'-P-CCNC: 27 U/L (ref 10–44)
ANION GAP SERPL CALC-SCNC: 10 MMOL/L (ref 8–16)
ANION GAP SERPL CALC-SCNC: 9 MMOL/L (ref 8–16)
AST SERPL-CCNC: 29 U/L (ref 10–40)
BILIRUB SERPL-MCNC: 10 MG/DL (ref 0.1–1)
BUN SERPL-MCNC: 41 MG/DL (ref 8–23)
BUN SERPL-MCNC: 42 MG/DL (ref 8–23)
CALCIUM SERPL-MCNC: 8.9 MG/DL (ref 8.7–10.5)
CALCIUM SERPL-MCNC: 9.1 MG/DL (ref 8.7–10.5)
CHLORIDE SERPL-SCNC: 97 MMOL/L (ref 95–110)
CHLORIDE SERPL-SCNC: 97 MMOL/L (ref 95–110)
CO2 SERPL-SCNC: 30 MMOL/L (ref 23–29)
CO2 SERPL-SCNC: 31 MMOL/L (ref 23–29)
CREAT SERPL-MCNC: 1.2 MG/DL (ref 0.5–1.4)
CREAT SERPL-MCNC: 1.3 MG/DL (ref 0.5–1.4)
ERYTHROCYTE [DISTWIDTH] IN BLOOD BY AUTOMATED COUNT: 24.5 % (ref 11.5–14.5)
EST. GFR  (NO RACE VARIABLE): 59.8 ML/MIN/1.73 M^2
EST. GFR  (NO RACE VARIABLE): >60 ML/MIN/1.73 M^2
GLUCOSE SERPL-MCNC: 117 MG/DL (ref 70–110)
GLUCOSE SERPL-MCNC: 96 MG/DL (ref 70–110)
HCT VFR BLD AUTO: 37.7 % (ref 40–54)
HGB BLD-MCNC: 12.9 G/DL (ref 14–18)
MAGNESIUM SERPL-MCNC: 2.2 MG/DL (ref 1.6–2.6)
MCH RBC QN AUTO: 24 PG (ref 27–31)
MCHC RBC AUTO-ENTMCNC: 34.2 G/DL (ref 32–36)
MCV RBC AUTO: 70 FL (ref 82–98)
PHOSPHATE SERPL-MCNC: 2.5 MG/DL (ref 2.7–4.5)
PLATELET # BLD AUTO: 86 K/UL (ref 150–450)
PMV BLD AUTO: ABNORMAL FL (ref 9.2–12.9)
POCT GLUCOSE: 111 MG/DL (ref 70–110)
POCT GLUCOSE: 128 MG/DL (ref 70–110)
POTASSIUM SERPL-SCNC: 3.5 MMOL/L (ref 3.5–5.1)
POTASSIUM SERPL-SCNC: 3.8 MMOL/L (ref 3.5–5.1)
PROT SERPL-MCNC: 6.3 G/DL (ref 6–8.4)
RBC # BLD AUTO: 5.38 M/UL (ref 4.6–6.2)
SMOOTH MUSCLE AB TITR SER IF: ABNORMAL {TITER}
SODIUM SERPL-SCNC: 137 MMOL/L (ref 136–145)
SODIUM SERPL-SCNC: 137 MMOL/L (ref 136–145)
WBC # BLD AUTO: 11.69 K/UL (ref 3.9–12.7)

## 2024-01-12 PROCEDURE — 84100 ASSAY OF PHOSPHORUS: CPT | Performed by: STUDENT IN AN ORGANIZED HEALTH CARE EDUCATION/TRAINING PROGRAM

## 2024-01-12 PROCEDURE — 25000003 PHARM REV CODE 250: Performed by: STUDENT IN AN ORGANIZED HEALTH CARE EDUCATION/TRAINING PROGRAM

## 2024-01-12 PROCEDURE — 63600175 PHARM REV CODE 636 W HCPCS

## 2024-01-12 PROCEDURE — 25000003 PHARM REV CODE 250

## 2024-01-12 PROCEDURE — 0JH63XZ INSERTION OF TUNNELED VASCULAR ACCESS DEVICE INTO CHEST SUBCUTANEOUS TISSUE AND FASCIA, PERCUTANEOUS APPROACH: ICD-10-PCS | Performed by: STUDENT IN AN ORGANIZED HEALTH CARE EDUCATION/TRAINING PROGRAM

## 2024-01-12 PROCEDURE — 02HV33Z INSERTION OF INFUSION DEVICE INTO SUPERIOR VENA CAVA, PERCUTANEOUS APPROACH: ICD-10-PCS | Performed by: STUDENT IN AN ORGANIZED HEALTH CARE EDUCATION/TRAINING PROGRAM

## 2024-01-12 PROCEDURE — 85027 COMPLETE CBC AUTOMATED: CPT | Performed by: STUDENT IN AN ORGANIZED HEALTH CARE EDUCATION/TRAINING PROGRAM

## 2024-01-12 PROCEDURE — 27000207 HC ISOLATION

## 2024-01-12 PROCEDURE — 20600001 HC STEP DOWN PRIVATE ROOM

## 2024-01-12 PROCEDURE — 36415 COLL VENOUS BLD VENIPUNCTURE: CPT | Mod: XB

## 2024-01-12 PROCEDURE — 80048 BASIC METABOLIC PNL TOTAL CA: CPT | Mod: XB

## 2024-01-12 PROCEDURE — 80053 COMPREHEN METABOLIC PANEL: CPT | Performed by: STUDENT IN AN ORGANIZED HEALTH CARE EDUCATION/TRAINING PROGRAM

## 2024-01-12 PROCEDURE — 83735 ASSAY OF MAGNESIUM: CPT | Performed by: STUDENT IN AN ORGANIZED HEALTH CARE EDUCATION/TRAINING PROGRAM

## 2024-01-12 PROCEDURE — 99223 1ST HOSP IP/OBS HIGH 75: CPT | Mod: 25,,, | Performed by: PHYSICIAN ASSISTANT

## 2024-01-12 PROCEDURE — 36415 COLL VENOUS BLD VENIPUNCTURE: CPT | Performed by: STUDENT IN AN ORGANIZED HEALTH CARE EDUCATION/TRAINING PROGRAM

## 2024-01-12 PROCEDURE — 25000242 PHARM REV CODE 250 ALT 637 W/ HCPCS

## 2024-01-12 RX ADMIN — SENNOSIDES AND DOCUSATE SODIUM 1 TABLET: 8.6; 5 TABLET ORAL at 08:01

## 2024-01-12 RX ADMIN — POTASSIUM BICARBONATE 25 MEQ: 978 TABLET, EFFERVESCENT ORAL at 08:01

## 2024-01-12 RX ADMIN — ASPIRIN 325 MG: 325 TABLET, COATED ORAL at 08:01

## 2024-01-12 RX ADMIN — ALUMINUM HYDROXIDE, MAGNESIUM HYDROXIDE, AND DIMETHICONE 10 ML: 400; 400; 40 SUSPENSION ORAL at 08:01

## 2024-01-12 RX ADMIN — EMPAGLIFLOZIN 10 MG: 10 TABLET, FILM COATED ORAL at 08:01

## 2024-01-12 RX ADMIN — HEPARIN SODIUM 5000 UNITS: 5000 INJECTION INTRAVENOUS; SUBCUTANEOUS at 09:01

## 2024-01-12 RX ADMIN — HEPARIN SODIUM 5000 UNITS: 5000 INJECTION INTRAVENOUS; SUBCUTANEOUS at 05:01

## 2024-01-12 RX ADMIN — PRAVASTATIN SODIUM 80 MG: 40 TABLET ORAL at 08:01

## 2024-01-12 RX ADMIN — ALUMINUM HYDROXIDE, MAGNESIUM HYDROXIDE, AND DIMETHICONE 10 ML: 400; 400; 40 SUSPENSION ORAL at 09:01

## 2024-01-12 RX ADMIN — HEPARIN SODIUM 5000 UNITS: 5000 INJECTION INTRAVENOUS; SUBCUTANEOUS at 01:01

## 2024-01-12 RX ADMIN — AMIODARONE HYDROCHLORIDE 200 MG: 200 TABLET ORAL at 08:01

## 2024-01-12 RX ADMIN — ALUMINUM HYDROXIDE, MAGNESIUM HYDROXIDE, AND DIMETHICONE 10 ML: 400; 400; 40 SUSPENSION ORAL at 01:01

## 2024-01-12 RX ADMIN — SPIRONOLACTONE 25 MG: 25 TABLET ORAL at 08:01

## 2024-01-12 NOTE — ASSESSMENT & PLAN NOTE
Chronic, controlled. Latest blood pressure and vitals reviewed-     Temp:  [97.7 °F (36.5 °C)-97.8 °F (36.6 °C)]   Pulse:  [73-87]   Resp:  [17-19]   BP: ()/(54-71)   SpO2:  [90 %-96 %] .   Home meds for hypertension were reviewed and noted below.   Hypertension Medications               furosemide (LASIX) 80 MG tablet TAKE 1 TABLET EVERY DAY    metoprolol succinate (TOPROL-XL) 50 MG 24 hr tablet TAKE 1 TABLET EVERY DAY    spironolactone (ALDACTONE) 25 MG tablet TAKE 1/2 TABLET (12.5 MG TOTAL) ONCE DAILY. HOLD IF SYSTOLIC BLOOD PRESSURE IS LESS THAN 110            While in the hospital, will manage blood pressure as follows; Adjust home antihypertensive regimen as follows- Holding in setting of borderline pressures in setting of new diuresis     Will utilize p.r.n. blood pressure medication only if patient's blood pressure greater than 180/110 and he develops symptoms such as worsening chest pain or shortness of breath.

## 2024-01-12 NOTE — CONSULTS
Tunneled PICC Placement Consult Note  Interventional Radiology    Consult Requested By: Deepa Holly MD  Reason for Consult: Needing tunneled line for outpatient dobutamine    SUBJECTIVE:     Chief Complaint:  heart failure    History of Present Illness:  Papito Bhakta is a 68 y.o. male with a PMHx of NICM, combined CHF(11/2023 EF 10 -15%) s/p ICD placement, CKD, HLD, HTN, and AMELIA who was admitted on 12/29/23 for acute heart failure exacerbation. Hospital course notable for IV diuresis and initiation of dobutamine, COVID infection (positive result on 12/31), hyperbilirubinemia 2/2 congestive hepatopathy. Interventional Radiology has been consulted for tunneled PICC placement for home inotropes. Pt is not a candidate for PICC placement due to h/o CKD. WBC is 11.69, last set of blood cultures on 12/31 revealed NGTD. The pt is intermittently hypotensive (80-90s/50s), otherwise HDS. He does not take any blood thinners.    Scheduled Meds:   amiodarone  200 mg Oral Daily    aspirin  325 mg Oral Daily    duke's soln (benadryl 30 mL, mylanta 30 mL, LIDOcaine 30 mL, nystatin 30 mL) 120 mL  10 mL Oral QID    empagliflozin  10 mg Oral Daily    heparin (porcine)  5,000 Units Subcutaneous Q8H    potassium bicarbonate  25 mEq Oral BID    pravastatin  80 mg Oral Daily    senna-docusate 8.6-50 mg  1 tablet Oral Daily    spironolactone  25 mg Oral Daily     Continuous Infusions:   DOBUTamine IV infusion (non-titrating) 2.5 mcg/kg/min (01/11/24 0330)    furosemide (LASIX) 500 mg in 50 mL infusion (conc: 10 mg/mL) 20 mg/hr (01/11/24 2120)     PRN Meds:acetaminophen, dextrose 10%, dextrose 10%, glucagon (human recombinant), glucose, glucose, guaiFENesin 100 mg/5 ml, naloxone, sodium chloride 0.9%    Review of patient's allergies indicates:   Allergen Reactions    Ramipril      Leg swelling and gynecomastia     Losartan Rash       Past Medical History:   Diagnosis Date    RIGOBERTO (acute kidney injury) 10/7/2020    Anticoagulant long-term  use     Aspirin    CHF (congestive heart failure)     Hyperlipidemia     Hypertension     Microcytic anemia 12/31/2023    NICM (nonischemic cardiomyopathy) 6/16/2020    Obesity     AMELIA (obstructive sleep apnea) 1/7/2022    Peripheral vascular disease, unspecified 2/1/2021     Past Surgical History:   Procedure Laterality Date    ESOPHAGOGASTRODUODENOSCOPY N/A 1/3/2024    Procedure: EGD (ESOPHAGOGASTRODUODENOSCOPY);  Surgeon: Vaughn Oliver MD;  Location[facility] ([facility]  Service: Endoscopy;  Laterality: N/A;    ESOPHAGOGASTRODUODENOSCOPY N/A 1/5/2024    Procedure: EGD (ESOPHAGOGASTRODUODENOSCOPY);  Surgeon: Faith Juares MD;  Location: [facility] ENDO ([facility]);  Service: Endoscopy;  Laterality: N/A;    INSERTION OF BIVENTRICULAR IMPLANTABLE CARDIOVERTER-DEFIBRILLATOR (ICD) N/A 02/13/2019    Procedure: INSERTION, ICD, BIVENTRICULAR;  Surgeon: Shailesh Eng MD;  Location: [facility] CATH LAB;  Service: Cardiology;  Laterality: N/A;  RN PRE OP 2-6-19  1ST CASE PER  RADHA. NOTIFIED RADHA THAT ANESTHESIA IS NOT PITO FOR 1ST CASE START-LO    INSERTION OF BIVENTRICULAR IMPLANTABLE CARDIOVERTER-DEFIBRILLATOR (ICD) N/A 09/28/2020    Procedure: INSERTION, ICD, BIVENTRICULAR;  Surgeon: Jim Kwong MD;  Location: [facility] EP LAB;  Service: Cardiology;  Laterality: N/A;  NICM, CRT-D, SJM,, MAC, DM, 3 Prep*Wearing LifeVest*    oral extraction  11/2018    TESTICLE SURGERY       Family History   Problem Relation Age of Onset    Epilepsy Mother      Social History     Tobacco Use    Smoking status: Former     Current packs/day: 0.00     Average packs/day: 0.5 packs/day for 40.0 years (20.0 ttl pk-yrs)     Types: Cigarettes, Cigars     Start date: 1974     Quit date: 2014     Years since quitting: 10.0     Passive exposure: Current    Smokeless tobacco: Never   Substance Use Topics    Alcohol use: Yes     Comment: once a month beer or liquor    Drug use: No       OBJECTIVE:     Vital Signs (Most Recent)  Temp: 98.2 °F (36.8 °C)  "(01/12/24 1229)  Pulse: 80 (01/12/24 1229)  Resp: 20 (01/12/24 1229)  BP: 101/64 (01/12/24 1229)  SpO2: 95 % (01/12/24 1229)    Physical Exam:  Physical Exam  Vitals and nursing note reviewed.   Constitutional:       General: He is not in acute distress.     Appearance: He is ill-appearing.   HENT:      Head: Normocephalic and atraumatic.   Eyes:      General: Scleral icterus present.      Conjunctiva/sclera: Conjunctivae normal.      Pupils: Pupils are equal, round, and reactive to light.   Pulmonary:      Effort: Pulmonary effort is normal. No respiratory distress.   Abdominal:      General: Abdomen is flat. There is no distension.   Skin:     General: Skin is warm and dry.   Neurological:      General: No focal deficit present.      Mental Status: He is alert and oriented to person, place, and time.   Psychiatric:         Mood and Affect: Mood normal.         Behavior: Behavior normal.         Thought Content: Thought content normal.         Judgment: Judgment normal.         Laboratory  I have reviewed all pertinent lab results within the past 24 hours.  CBC:   Recent Labs   Lab 01/12/24  0651   WBC 11.69   RBC 5.38   HGB 12.9*   HCT 37.7*   PLT 86*   MCV 70*   MCH 24.0*   MCHC 34.2     BMP:   Recent Labs   Lab 01/12/24  0651   GLU 96      K 3.5   CL 97   CO2 30*   BUN 42*   CREATININE 1.3   CALCIUM 9.1   MG 2.2     CMP:   Recent Labs   Lab 01/12/24  0651   GLU 96   CALCIUM 9.1   ALBUMIN 2.2*   PROT 6.3      K 3.5   CO2 30*   CL 97   BUN 42*   CREATININE 1.3   ALKPHOS 102   ALT 27   AST 29   BILITOT 10.0*     LFTs:   Recent Labs   Lab 01/12/24  0651   ALT 27   AST 29   ALKPHOS 102   BILITOT 10.0*   PROT 6.3   ALBUMIN 2.2*     Coagulation: No results for input(s): "LABPROT", "INR", "APTT" in the last 168 hours.  Microbiology Results (last 7 days)       Procedure Component Value Units Date/Time    Urine culture [[id number]]  (Abnormal)  (Susceptibility) Collected: 01/06/24 1303    Order Status: " Completed Specimen: Urine Updated: 01/09/24 1049     Urine Culture, Routine ENTEROCOCCUS FAECALIS  10,000 - 49,999 cfu/ml      Narrative:      Specimen Source->Urine            ASA/Mallampati  ASA: 3  Mallampati: 2    Imaging:  Recent imaging studies reviewed.     ASSESSMENT/PLAN:     Assessment:  68 y.o. male with a PMHx of NICM, combined CHF(11/2023 EF 10 -15%) s/p ICD placement, CKD, HLD, HTN, and AMELIA who has been referred to IR for tunneled PICC placement for home inotropes. The procedure was discussed in great detail with the patient including thorough explanations of the potential risks and benefits of tunneled PICC placement. Risks include bleeding at the puncture site, infection, catheter related thrombus, catheter dysfunction, and central vein stenosis. The patient is a candidate for tunneled PICC placement under local anesthesia. Plan discussed with ordering physician.The pt verbalized understanding of the plan and would like to proceed.    Plan:  Will proceed with tunneled PICC placement under local anesthesia on 1/12/24  No need for NPO status  Anticoagulation history reviewed.  Coagulation labs reviewed.  Thank you for the consult. Please contact with questions via [email or web address] secure chat or spectra.    Deepa Hill PA-C  Interventional Radiology  Spectra: [phone]

## 2024-01-12 NOTE — SUBJECTIVE & OBJECTIVE
Interval History: NAEO. AF, HDS on RA. Still complaining of some L hand pain from where the PIV was for the dobutamine infusion.    Objective:     Vital Signs (Most Recent):  Temp: 97.8 °F (36.6 °C) (01/12/24 0302)  Pulse: 77 (01/12/24 1119)  Resp: 17 (01/12/24 0302)  BP: (!) 93/54 (01/12/24 0819)  SpO2: 95 % (01/12/24 0302) Vital Signs (24h Range):  Temp:  [97.7 °F (36.5 °C)-98.1 °F (36.7 °C)] 97.8 °F (36.6 °C)  Pulse:  [73-87] 77  Resp:  [17-19] 17  SpO2:  [90 %-99 %] 95 %  BP: ()/(54-71) 93/54     Weight: 101.2 kg (223 lb 1.7 oz)  Body mass index is 26.46 kg/m².    Intake/Output Summary (Last 24 hours) at 1/12/2024 1218  Last data filed at 1/12/2024 0604  Gross per 24 hour   Intake 1288.29 ml   Output 4300 ml   Net -3011.71 ml         Physical Exam  Vitals and nursing note reviewed.   Constitutional:       Appearance: He is ill-appearing.   HENT:      Head: Normocephalic and atraumatic.      Mouth/Throat:      Mouth: Mucous membranes are dry.   Eyes:      General: Scleral icterus present.      Extraocular Movements: Extraocular movements intact.      Comments: Anisocoria   Neck:      Vascular: JVD (to the ear) present.   Cardiovascular:      Rate and Rhythm: Normal rate and regular rhythm.      Heart sounds:      Gallop present.   Pulmonary:      Effort: Pulmonary effort is normal. No respiratory distress.      Breath sounds: Wheezing present. No rales.   Abdominal:      General: Abdomen is flat. There is no distension.      Palpations: Abdomen is soft.      Tenderness: There is no abdominal tenderness.   Musculoskeletal:         General: Tenderness (b/l LE) present.      Right lower leg: Edema present.      Left lower leg: Edema present.   Skin:     General: Skin is warm and dry.   Neurological:      General: No focal deficit present.      Mental Status: He is alert and oriented to person, place, and time.      Motor: Weakness present.   Psychiatric:         Mood and Affect: Mood normal.         Behavior:  Behavior normal.             Significant Labs: All pertinent labs within the past 24 hours have been reviewed.    Significant Imaging: I have reviewed all pertinent imaging results/findings within the past 24 hours.

## 2024-01-12 NOTE — PLAN OF CARE
Problem: Adult Inpatient Plan of Care  Goal: Plan of Care Review  Outcome: Ongoing, Progressing  Goal: Patient-Specific Goal (Individualized)  Outcome: Ongoing, Progressing  Goal: Absence of Hospital-Acquired Illness or Injury  Outcome: Ongoing, Progressing  Goal: Optimal Comfort and Wellbeing  Outcome: Ongoing, Progressing  Goal: Readiness for Transition of Care  Outcome: Ongoing, Progressing     Problem: Fluid and Electrolyte Imbalance (Acute Kidney Injury/Impairment)  Goal: Fluid and Electrolyte Balance  Outcome: Ongoing, Progressing     Problem: Oral Intake Inadequate (Acute Kidney Injury/Impairment)  Goal: Optimal Nutrition Intake  Outcome: Ongoing, Progressing     Problem: Renal Function Impairment (Acute Kidney Injury/Impairment)  Goal: Effective Renal Function  Outcome: Ongoing, Progressing     Problem: Impaired Wound Healing  Goal: Optimal Wound Healing  Outcome: Ongoing, Progressing     Problem: Adjustment to Illness (Heart Failure)  Goal: Optimal Coping  Outcome: Ongoing, Progressing     Problem: Cardiac Output Decreased (Heart Failure)  Goal: Optimal Cardiac Output  Outcome: Ongoing, Progressing     Problem: Dysrhythmia (Heart Failure)  Goal: Stable Heart Rate and Rhythm  Outcome: Ongoing, Progressing     Problem: Fluid Imbalance (Heart Failure)  Goal: Fluid Balance  Outcome: Ongoing, Progressing     Problem: Functional Ability Impaired (Heart Failure)  Goal: Optimal Functional Ability  Outcome: Ongoing, Progressing     Problem: Oral Intake Inadequate (Heart Failure)  Goal: Optimal Nutrition Intake  Outcome: Ongoing, Progressing     Problem: Respiratory Compromise (Heart Failure)  Goal: Effective Oxygenation and Ventilation  Outcome: Ongoing, Progressing     Problem: Sleep Disordered Breathing (Heart Failure)  Goal: Effective Breathing Pattern During Sleep  Outcome: Ongoing, Progressing     Problem: Skin Injury Risk Increased  Goal: Skin Health and Integrity  Outcome: Ongoing, Progressing     Problem:  Infection  Goal: Absence of Infection Signs and Symptoms  Outcome: Ongoing, Progressing     Problem: Coping Ineffective  Goal: Effective Coping  Outcome: Ongoing, Progressing     Patient alert and responsive to nursing care.  Vital signs WNL.  C/o of pain and discomfort.  PRN Tylenol given for pain  No cardiopulmomary distress.    PIV patent and saline locked.  Medication given per MD order.  Assistance with ADL care as needed.    Fall precaution maintained.  Call light and bedside table within reach. Plan of care reviewed and patient education provided. Will continue to monitor for changes of condition

## 2024-01-12 NOTE — PLAN OF CARE
Plan of care reviewed with patient. Call light within reach, fall precautions maintained bed alarm set. Patient made aware. Nurse instructed patient to call for assistance. Patient verbalized understanding. Telemetry monitor throughout shift. NAD noted. Will continue to monitor and continue plan of care.       Problem: Adult Inpatient Plan of Care  Goal: Plan of Care Review  Outcome: Ongoing, Progressing  Goal: Patient-Specific Goal (Individualized)  Outcome: Ongoing, Progressing  Goal: Absence of Hospital-Acquired Illness or Injury  Outcome: Ongoing, Progressing  Goal: Optimal Comfort and Wellbeing  Outcome: Ongoing, Progressing  Goal: Readiness for Transition of Care  Outcome: Ongoing, Progressing     Problem: Fluid and Electrolyte Imbalance (Acute Kidney Injury/Impairment)  Goal: Fluid and Electrolyte Balance  Outcome: Ongoing, Progressing     Problem: Oral Intake Inadequate (Acute Kidney Injury/Impairment)  Goal: Optimal Nutrition Intake  Outcome: Ongoing, Progressing     Problem: Renal Function Impairment (Acute Kidney Injury/Impairment)  Goal: Effective Renal Function  Outcome: Ongoing, Progressing     Problem: Impaired Wound Healing  Goal: Optimal Wound Healing  Outcome: Ongoing, Progressing     Problem: Adjustment to Illness (Heart Failure)  Goal: Optimal Coping  Outcome: Ongoing, Progressing     Problem: Cardiac Output Decreased (Heart Failure)  Goal: Optimal Cardiac Output  Outcome: Ongoing, Progressing     Problem: Dysrhythmia (Heart Failure)  Goal: Stable Heart Rate and Rhythm  Outcome: Ongoing, Progressing     Problem: Fluid Imbalance (Heart Failure)  Goal: Fluid Balance  Outcome: Ongoing, Progressing     Problem: Functional Ability Impaired (Heart Failure)  Goal: Optimal Functional Ability  Outcome: Ongoing, Progressing     Problem: Oral Intake Inadequate (Heart Failure)  Goal: Optimal Nutrition Intake  Outcome: Ongoing, Progressing     Problem: Respiratory Compromise (Heart Failure)  Goal: Effective  Oxygenation and Ventilation  Outcome: Ongoing, Progressing     Problem: Sleep Disordered Breathing (Heart Failure)  Goal: Effective Breathing Pattern During Sleep  Outcome: Ongoing, Progressing     Problem: Skin Injury Risk Increased  Goal: Skin Health and Integrity  Outcome: Ongoing, Progressing     Problem: Infection  Goal: Absence of Infection Signs and Symptoms  Outcome: Ongoing, Progressing     Problem: Coping Ineffective  Goal: Effective Coping  Outcome: Ongoing, Progressing

## 2024-01-12 NOTE — ASSESSMENT & PLAN NOTE
Continues to increase. Direct bilirubinemia. No signs of hemolysis. Likely 2/2 to congestive hepatopathy.     - Continuing to diurese  - Daily CMPs  - Appears chronic since November. Previously reported as secondary to congestive hepatopathy however remaining LFTs likely WNL.   - US Liver with doppler showing: Bidirectional portal vein flow may be seen with right heart failure, tricuspid regurgitation, or portal hypertension. Evidence of volume overload with distended IVC and hepatic veins.  Pulsatile flow through the hepatic veins (also possibly suggesting tricuspid regurgitation). Suspected hepatic steatosis. No evidence of biliary obstruction.  Possible genetic condition     Sent a 30 day supply to pharmacy of the finasteride.  Please call patient to schedule a fasting med check

## 2024-01-12 NOTE — ASSESSMENT & PLAN NOTE
Patient is identified as having Combined Systolic and Diastolic heart failure that is Acute on chronic. CHF is currently uncontrolled due to Pulmonary edema/pleural effusion on CXR. Latest ECHO performed and demonstrates- Results for orders placed during the hospital encounter of 11/10/23    Echo    Interpretation Summary    Left Ventricle: The left ventricle is severely dilated. Mildly increased wall thickness. There is mild concentric hypertrophy. Severe global hypokinesis present. There is severely reduced systolic function with a visually estimated ejection fraction of 10 -15%. Grade III diastolic dysfunction.    Right Ventricle: Severe right ventricular enlargement. Systolic function is severely reduced.    Mitral Valve: There is no stenosis. There is mild to moderate regurgitation with a centrally directed jet.    Tricuspid Valve: There is mild to moderate regurgitation.    Pulmonary Artery: The estimated pulmonary artery systolic pressure is 67 mmHg.  .Last BNP reviewed- and noted below   Recent Labs   Lab 01/07/24  0755   BNP 2,503*         Presented for acute on chronic SOB with associated low UOP. Afebrile HDS. BNP 3,067, Troponin 0.031. CXR with cardiomegaly, vascular congestion, and edema. Received 100 of Lasix in the ED.    - Lasix and dobutamine gtt  - Cardiology following  - RIP positive for Covid, treatment completed.  - continue home Jardiance   - Restart spironolactone at 25mg qd  - Restart BB as tolerated  - Documented allergy to ACE/ARBs  - Cardiac diet with Fluid restriction at 1.5L with strict I/Os and daily STANDING weights  - Check Electrolytes, keep Mag >2 & K+ >4  - SCDs, Ambulate as tolerated    - Strict I&Os, Daily standing weights  - Review Low-salt diet and enforce medication adherence on discharge

## 2024-01-12 NOTE — NURSING
Patient episode of nonsustaing vtach.  Patient indicates asymptomatic.  Vital signs WNL.  Paged on call for Med 2.  Received call from Dr Cris Capone.  Notify the nonsustained episode eight beats vtach.  No new orders.  Continue to monitor for change in condition

## 2024-01-12 NOTE — PT/OT/SLP PROGRESS
Physical Therapy      Patient Name:  Papito Bhakta   MRN:  [id number]    Patient not seen today secondary to Patient unwilling to participate, Patient fatigue (attempted to see pt in the late AM per pt request, but pt reported he was tired and didn't feel like participating in therapy today). Will follow-up as appropriate.  Jayda Cornejo, PT

## 2024-01-12 NOTE — PLAN OF CARE
Pt arrived to IR rm 190 for Tunneled PICC placement. Pt oriented to unit and staff, Pt safely transferred from stretcher to procedural table. Fall risk reviewed and comfort measures utilized with interventions. Safety strap applied, position pillows to minimize pressure points. Blankets applied. Pt prepped and draped utilizing standard sterile technique. Patient placed on continuous monitoring, as required by sedation policy. Timeouts implemented utilizing standard universal time-out per department and facility policy. Pt resting comfortably. Denies pain/discomfort. Will continue to monitor. See flow sheets for monitoring, medication administration, and updates. patient verbalizes understanding.

## 2024-01-12 NOTE — ASSESSMENT & PLAN NOTE
Patient with Hypoxic Respiratory failure which is Acute.  he is not on home oxygen. Supplemental oxygen was provided and noted-      Patient with persistent O2 requirement of 2-3 LPM. Largest contribution likely due to fluid overload in setting of heart failure exacerbation and possible urinary retention. Also found to be COVID positive, s/p course of remdesivir. With his persistent hypoxia, possible that he is developing a post viral pneumonia, or an aspiration pneumonia in setting of dysphagia.   Troponin and repeat EKG WNL, concern for ACS low.   He does have baseline arrhythmia with PM in place, recently interrogated. No AS on recent TTE.    - Continue lasix gtt (see acute on chronic HF) and dobutamine gtt  - Obtaining tunneled line for inpatient and outpatient dobutamine infusion  - Adding GDMT as tolerated  - Wean O2 as tolerated  - BNP repeated and still elevated at 2503.  - Consulting cardiology for failure to improve despite diuresis  - PT/OT consulted

## 2024-01-12 NOTE — ASSESSMENT & PLAN NOTE
Creatinine 2.1 on admit, baseline around 1.4. Likely prerenal etiology given urine sodium and FENa vs progression of CKD. Improving with increased diuresis     Recent Labs     01/11/24  0519 01/11/24  1637 01/12/24  0651   BUN 43* 46* 42*   CREATININE 1.3 1.4 1.3         Estimated Creatinine Clearance: 68.5 mL/min (based on SCr of 1.3 mg/dL).  RESOLVED    - Renal US: renal cysts, no hydronephrosis  - Urine Na: 14, Pr/Cr: 0.72; FENa 0.2%  - Monitor urine output and serial BMP and adjust therapy as needed.   - Strict I&Os and daily weights   - Avoid nephrotoxic agents such as NSAIDs, gadolinium and IV radiocontrast.  - Renally dose meds to current GFR.  - Maintain MAP > 65.  - Nephrology referral outpatient

## 2024-01-12 NOTE — PROGRESS NOTES
Dmitry Colon - Cardiology [facility] Medicine  Progress Note    Patient Name: Papito Bhakta  MRN: [id number]  Patient Class: IP- Inpatient   Admission Date: 12/29/2023  Length of Stay: 10 days  Attending Physician: Cayetano Somers MD  Primary Care Provider: Brynn To MD        Subjective:     Principal Problem:Serum total bilirubin elevated        HPI:  Papito Bhakta is a 68 y.o. male with NICM, combined CHF(11/2023 EF 10 -15%, G3DD) s/p ICD placement, CKD, HLD, HTN, and AMELIA who presents for bilateral lower extremity swelling and shortness of breath. Patient reports he has been having worsening shortness of breath for the past 6 months. He had acutely worsening SOB today where he described becoming profoundly dyspneic on exertion. He reported taking 2 of his 80 mg Lasix this morning with subsequent minimal urine output and minimal improvement in his SOB. He reports SOB aggravated with lying down and he requires frequent positional changes to keep comfortable. He reports additional poor appetite and urine output for the past week although he reports he has been drinking well. He had 2 episodes of nausea/vomiting this past week as well. He denies fever/chills, chest pain, abdominal pain, recent illness, medication changes. Patient reports adherence with all medications. He reports he lives with his daughter who helps him with his medications and healthcare.    Additionally he reports chronic leg pain from a chronic RLE wound. He went to wound care yesterday where dressings were changed and he was told they were healing well. He has bilateral lower extremity edema R>L that is typical for him and he denies recent worsening.    Overview/Hospital Course:  Pt admitted to hospital medicine for acute heart failure exacerbation and inability to swallow solids for 3 months duration. Initiated on IV lasix. SLP evaluated the patient and determined that he could tolerate liquids. He had a new leukocytosis noted on  "12/31, work up significant for COVID positive. He completed remdesivir, steroids held in order to prevent worsening fluid retention, and was able to be weaned to room oxygen by 1/4. Overnight 12/31, patient becane tachycardic and hypotensive (asymptomatic). Cardiology called and concluded that he was in atrial fibrillation with RVR and he was briefly changed from oral amiodarone to IV amiodarone for one day before resuming his oral dosing. PM interrogated, noted to be functioning accurately. Cardiology signed off. IV lasix increased from pushes to gtt. Urine output did not significantly increase, bladder scan with 325ml urine. Mcmahon placed.     GI was consulted for his ongoing dysphagia. With his ongoing aggressive diuresis, COVID infection, and episode of hemodynamic stability, the EGD was deferred until he becomes more euvolemic.     Transient episode of increased respiratory distress 1/6. EKG without significant changes from prior. Troponin negative. CXR showed stability from prior imaging.    During his admission, noted to have chronic bilirubinemia, assumed likely secondary to congestive hepatopathy. RUQ US without biliary obstruction. Hemolysis labs negative. Tbili continued to uptrend despite improvement in fluid status and renal function. Also noted to have worsening thrombocytopenia, despite normal LFTs. Hepatology consulted, who agree with congestive hepatopathy. Further work up pending. He has chronic lower extremity leg wounds secondary to peripheral vascular disease. Wound care was consulted and assisted in managing the wounds during his admission. He has outpatient follow-up scheduled with vascular surgery at the end of February. Cardiology recommending aggressive diuresis, started him on a dobutamine gtt and continued lasix gtt.    1/10 expressing desire to stop further treatment and to discharge to home. States he wishes to live comfortably at home and "if it's my time it's my time". Palliative care " consulted. 1/11 expressing desire to continue treatment and change back to full code.    Placing tunneled line for outpatient dobutamine.    Interval History: NAEO. AF, HDS on RA. Still complaining of some L hand pain from where the PIV was for the dobutamine infusion.    Objective:     Vital Signs (Most Recent):  Temp: 97.8 °F (36.6 °C) (01/12/24 0302)  Pulse: 77 (01/12/24 1119)  Resp: 17 (01/12/24 0302)  BP: (!) 93/54 (01/12/24 0819)  SpO2: 95 % (01/12/24 0302) Vital Signs (24h Range):  Temp:  [97.7 °F (36.5 °C)-98.1 °F (36.7 °C)] 97.8 °F (36.6 °C)  Pulse:  [73-87] 77  Resp:  [17-19] 17  SpO2:  [90 %-99 %] 95 %  BP: ()/(54-71) 93/54     Weight: 101.2 kg (223 lb 1.7 oz)  Body mass index is 26.46 kg/m².    Intake/Output Summary (Last 24 hours) at 1/12/2024 1218  Last data filed at 1/12/2024 0604  Gross per 24 hour   Intake 1288.29 ml   Output 4300 ml   Net -3011.71 ml         Physical Exam  Vitals and nursing note reviewed.   Constitutional:       Appearance: He is ill-appearing.   HENT:      Head: Normocephalic and atraumatic.      Mouth/Throat:      Mouth: Mucous membranes are dry.   Eyes:      General: Scleral icterus present.      Extraocular Movements: Extraocular movements intact.      Comments: Anisocoria   Neck:      Vascular: JVD (to the ear) present.   Cardiovascular:      Rate and Rhythm: Normal rate and regular rhythm.      Heart sounds:      Gallop present.   Pulmonary:      Effort: Pulmonary effort is normal. No respiratory distress.      Breath sounds: Wheezing present. No rales.   Abdominal:      General: Abdomen is flat. There is no distension.      Palpations: Abdomen is soft.      Tenderness: There is no abdominal tenderness.   Musculoskeletal:         General: Tenderness (b/l LE) present.      Right lower leg: Edema present.      Left lower leg: Edema present.   Skin:     General: Skin is warm and dry.   Neurological:      General: No focal deficit present.      Mental Status: He is alert  "and oriented to person, place, and time.      Motor: Weakness present.   Psychiatric:         Mood and Affect: Mood normal.         Behavior: Behavior normal.             Significant Labs: All pertinent labs within the past 24 hours have been reviewed.    Significant Imaging: I have reviewed all pertinent imaging results/findings within the past 24 hours.    Assessment/Plan:      * Serum total bilirubin elevated  Continues to increase. Direct bilirubinemia. No signs of hemolysis. Likely 2/2 to congestive hepatopathy.     - Continuing to diurese  - Daily CMPs  - Appears chronic since November. Previously reported as secondary to congestive hepatopathy however remaining LFTs likely WNL.   - US Liver with doppler showing: Bidirectional portal vein flow may be seen with right heart failure, tricuspid regurgitation, or portal hypertension. Evidence of volume overload with distended IVC and hepatic veins.  Pulsatile flow through the hepatic veins (also possibly suggesting tricuspid regurgitation). Suspected hepatic steatosis. No evidence of biliary obstruction.  Possible genetic condition      ACP (advance care planning)  1/10, patient stating desire to go home. He is tired of his prolonged hospital course and feeling defeated at the slow trajectory of his remaining course. States he wants to be comfortable at home, "if it's my time it's my time". We discussed benefits of staying, include improved quality of life if we are able to medically optimize  Palliative care consulted, greatly appreciate assistance. With this conversation, along with conversations with family, he has agreed to stay for now. Will continue to engage in GOC conversations.     DNR order placed per palliative  Need to assess utility of ICD    1/11, patient now stating that he would like all interventions done if needed and does not want ICD turned off  Order placed to make patient Full Code       CKD (chronic kidney disease)  Creatine stable for now. " BMP reviewed- noted Estimated Creatinine Clearance: 68.5 mL/min (based on SCr of 1.3 mg/dL). according to latest data. Based on current GFR, CKD stage is stage 3 - GFR 30-59.  Monitor UOP and serial BMP and adjust therapy as needed. Renally dose meds. Avoid nephrotoxic medications and procedures.    COVID-19  COVID positive, noted 12/31    - Finished remdesivir course 01/02/24    Microcytic anemia  Iron studies notable for Fe 26 and sat iron 8%. TIBC, ferritin, transferrin wnl. Likely iron deficiency anemia.    - Daily CBCs  - Feraheme given    Dysphagia  Reports a 3 month history of inability to tolerate solid foods. Notable vomiting immediatly with swallowing food/liquid. No complaints of nausea. SLP has assessed him, can tolerate liquids.     - Continue liquid diet, Boost TID  - GI cancelled EGD and signing off. Will re-consult once patient is more euvolemic and hemodynamically stable.       Pupil asymmetry  Notable asymetry on pupils by patient. Chart review shows left orbital trauma in 2021 with notes concerned for dilation and vision changes. When talking to daughter, this is chronic. States no vision changes or neuro symptoms whatsoever.     Acute on chronic combined systolic and diastolic CHF (congestive heart failure)  Patient is identified as having Combined Systolic and Diastolic heart failure that is Acute on chronic. CHF is currently uncontrolled due to Pulmonary edema/pleural effusion on CXR. Latest ECHO performed and demonstrates- Results for orders placed during the hospital encounter of 11/10/23    Echo    Interpretation Summary    Left Ventricle: The left ventricle is severely dilated. Mildly increased wall thickness. There is mild concentric hypertrophy. Severe global hypokinesis present. There is severely reduced systolic function with a visually estimated ejection fraction of 10 -15%. Grade III diastolic dysfunction.    Right Ventricle: Severe right ventricular enlargement. Systolic function is  severely reduced.    Mitral Valve: There is no stenosis. There is mild to moderate regurgitation with a centrally directed jet.    Tricuspid Valve: There is mild to moderate regurgitation.    Pulmonary Artery: The estimated pulmonary artery systolic pressure is 67 mmHg.  .Last BNP reviewed- and noted below   Recent Labs   Lab 01/07/24  0755   BNP 2,503*         Presented for acute on chronic SOB with associated low UOP. Afebrile HDS. BNP 3,067, Troponin 0.031. CXR with cardiomegaly, vascular congestion, and edema. Received 100 of Lasix in the ED.    - Lasix and dobutamine gtt  - Cardiology following  - RIP positive for Covid, treatment completed.  - continue home Jardiance   - Restart spironolactone at 25mg qd  - Restart BB as tolerated  - Documented allergy to ACE/ARBs  - Cardiac diet with Fluid restriction at 1.5L with strict I/Os and daily STANDING weights  - Check Electrolytes, keep Mag >2 & K+ >4  - SCDs, Ambulate as tolerated    - Strict I&Os, Daily standing weights  - Review Low-salt diet and enforce medication adherence on discharge    Acute renal failure superimposed on stage 3a chronic kidney disease  Creatinine 2.1 on admit, baseline around 1.4. Likely prerenal etiology given urine sodium and FENa vs progression of CKD. Improving with increased diuresis     Recent Labs     01/11/24  0519 01/11/24  1637 01/12/24  0651   BUN 43* 46* 42*   CREATININE 1.3 1.4 1.3         Estimated Creatinine Clearance: 68.5 mL/min (based on SCr of 1.3 mg/dL).  RESOLVED    - Renal US: renal cysts, no hydronephrosis  - Urine Na: 14, Pr/Cr: 0.72; FENa 0.2%  - Monitor urine output and serial BMP and adjust therapy as needed.   - Strict I&Os and daily weights   - Avoid nephrotoxic agents such as NSAIDs, gadolinium and IV radiocontrast.  - Renally dose meds to current GFR.  - Maintain MAP > 65.  - Nephrology referral outpatient    AMELIA (obstructive sleep apnea)  Carried diagnosis of AMELIA per chart, however he does not sleep with CPAP  at home and declines while here      Peripheral vascular disease, unspecified  Patient with chronic lower leg wounds that recently established with wound care. He is set to follow up with vascular surgery outpatient at the end of February.    Appreciate wound care recommendations  Continue diuresis       NICM (nonischemic cardiomyopathy)  ASA 325mg qd per home medication list. Reason for this dose unclear.      Biventricular ICD (implantable cardioverter-defibrillator) in place  Stable on admission, no acute issues, he denies discharge. QTc chronically prolonged. Continue home amiodarone for NSVT prevention. Monitor on telemetry     - 12/31, noted to have AF RVR with hypotension. Started on amio gtt. Per report, pacemaker malfunctioning.  - Restarted on home oral amio  - Device interrogated, no complications. EP has signed off    Hyperlipidemia  Stable. Continue Home Pravastatin.        Acute hypoxemic respiratory failure  Patient with Hypoxic Respiratory failure which is Acute.  he is not on home oxygen. Supplemental oxygen was provided and noted-      Patient with persistent O2 requirement of 2-3 LPM. Largest contribution likely due to fluid overload in setting of heart failure exacerbation and possible urinary retention. Also found to be COVID positive, s/p course of remdesivir. With his persistent hypoxia, possible that he is developing a post viral pneumonia, or an aspiration pneumonia in setting of dysphagia.   Troponin and repeat EKG WNL, concern for ACS low.   He does have baseline arrhythmia with PM in place, recently interrogated. No AS on recent TTE.    - Continue lasix gtt (see acute on chronic HF) and dobutamine gtt  - Obtaining tunneled line for inpatient and outpatient dobutamine infusion  - Adding GDMT as tolerated  - Wean O2 as tolerated  - BNP repeated and still elevated at 2503.  - Consulting cardiology for failure to improve despite diuresis  - PT/OT consulted    Essential hypertension  Chronic,  controlled. Latest blood pressure and vitals reviewed-     Temp:  [97.7 °F (36.5 °C)-97.8 °F (36.6 °C)]   Pulse:  [73-87]   Resp:  [17-19]   BP: ()/(54-71)   SpO2:  [90 %-96 %] .   Home meds for hypertension were reviewed and noted below.   Hypertension Medications               furosemide (LASIX) 80 MG tablet TAKE 1 TABLET EVERY DAY    metoprolol succinate (TOPROL-XL) 50 MG 24 hr tablet TAKE 1 TABLET EVERY DAY    spironolactone (ALDACTONE) 25 MG tablet TAKE 1/2 TABLET (12.5 MG TOTAL) ONCE DAILY. HOLD IF SYSTOLIC BLOOD PRESSURE IS LESS THAN 110            While in the hospital, will manage blood pressure as follows; Adjust home antihypertensive regimen as follows- Holding in setting of borderline pressures in setting of new diuresis     Will utilize p.r.n. blood pressure medication only if patient's blood pressure greater than 180/110 and he develops symptoms such as worsening chest pain or shortness of breath.      VTE Risk Mitigation (From admission, onward)           Ordered     heparin (porcine) injection 5,000 Units  Every 8 hours         12/29/23 1759     IP VTE HIGH RISK PATIENT  Once         12/29/23 1759     Place sequential compression device  Until discontinued         12/29/23 1759                    Discharge Planning   MARIIA: 1/16/2024     Code Status: Full Code   Is the patient medically ready for discharge?: No    Reason for patient still in hospital (select all that apply): Treatment  Discharge Plan A: Home with family, Home Health                  Deepa Holly MD  Department of Hospital Medicine   [facility] - Cardiology Stepdown

## 2024-01-13 LAB
ALBUMIN SERPL BCP-MCNC: 2.1 G/DL (ref 3.5–5.2)
ALP SERPL-CCNC: 102 U/L (ref 55–135)
ALT SERPL W/O P-5'-P-CCNC: 26 U/L (ref 10–44)
ANION GAP SERPL CALC-SCNC: 10 MMOL/L (ref 8–16)
AST SERPL-CCNC: 26 U/L (ref 10–40)
BILIRUB SERPL-MCNC: 9.6 MG/DL (ref 0.1–1)
BUN SERPL-MCNC: 40 MG/DL (ref 8–23)
CALCIUM SERPL-MCNC: 9 MG/DL (ref 8.7–10.5)
CHLORIDE SERPL-SCNC: 97 MMOL/L (ref 95–110)
CO2 SERPL-SCNC: 33 MMOL/L (ref 23–29)
CREAT SERPL-MCNC: 1.3 MG/DL (ref 0.5–1.4)
ERYTHROCYTE [DISTWIDTH] IN BLOOD BY AUTOMATED COUNT: 24.5 % (ref 11.5–14.5)
EST. GFR  (NO RACE VARIABLE): 59.8 ML/MIN/1.73 M^2
GLUCOSE SERPL-MCNC: 91 MG/DL (ref 70–110)
HCT VFR BLD AUTO: 35.9 % (ref 40–54)
HGB BLD-MCNC: 12.2 G/DL (ref 14–18)
MAGNESIUM SERPL-MCNC: 2.3 MG/DL (ref 1.6–2.6)
MCH RBC QN AUTO: 24.1 PG (ref 27–31)
MCHC RBC AUTO-ENTMCNC: 34 G/DL (ref 32–36)
MCV RBC AUTO: 71 FL (ref 82–98)
PHOSPHATE SERPL-MCNC: 2.6 MG/DL (ref 2.7–4.5)
PLATELET # BLD AUTO: 87 K/UL (ref 150–450)
PMV BLD AUTO: ABNORMAL FL (ref 9.2–12.9)
POTASSIUM SERPL-SCNC: 3.8 MMOL/L (ref 3.5–5.1)
PROT SERPL-MCNC: 6.4 G/DL (ref 6–8.4)
RBC # BLD AUTO: 5.06 M/UL (ref 4.6–6.2)
SODIUM SERPL-SCNC: 140 MMOL/L (ref 136–145)
WBC # BLD AUTO: 9.91 K/UL (ref 3.9–12.7)

## 2024-01-13 PROCEDURE — 36415 COLL VENOUS BLD VENIPUNCTURE: CPT | Performed by: STUDENT IN AN ORGANIZED HEALTH CARE EDUCATION/TRAINING PROGRAM

## 2024-01-13 PROCEDURE — 36415 COLL VENOUS BLD VENIPUNCTURE: CPT | Mod: XB

## 2024-01-13 PROCEDURE — 27000207 HC ISOLATION

## 2024-01-13 PROCEDURE — 63600175 PHARM REV CODE 636 W HCPCS

## 2024-01-13 PROCEDURE — 25000003 PHARM REV CODE 250: Performed by: STUDENT IN AN ORGANIZED HEALTH CARE EDUCATION/TRAINING PROGRAM

## 2024-01-13 PROCEDURE — 84100 ASSAY OF PHOSPHORUS: CPT | Performed by: STUDENT IN AN ORGANIZED HEALTH CARE EDUCATION/TRAINING PROGRAM

## 2024-01-13 PROCEDURE — 20600001 HC STEP DOWN PRIVATE ROOM

## 2024-01-13 PROCEDURE — 25000242 PHARM REV CODE 250 ALT 637 W/ HCPCS

## 2024-01-13 PROCEDURE — 85027 COMPLETE CBC AUTOMATED: CPT | Performed by: STUDENT IN AN ORGANIZED HEALTH CARE EDUCATION/TRAINING PROGRAM

## 2024-01-13 PROCEDURE — 80053 COMPREHEN METABOLIC PANEL: CPT | Performed by: STUDENT IN AN ORGANIZED HEALTH CARE EDUCATION/TRAINING PROGRAM

## 2024-01-13 PROCEDURE — 25000003 PHARM REV CODE 250

## 2024-01-13 PROCEDURE — 83735 ASSAY OF MAGNESIUM: CPT | Performed by: STUDENT IN AN ORGANIZED HEALTH CARE EDUCATION/TRAINING PROGRAM

## 2024-01-13 PROCEDURE — 63600175 PHARM REV CODE 636 W HCPCS: Performed by: STUDENT IN AN ORGANIZED HEALTH CARE EDUCATION/TRAINING PROGRAM

## 2024-01-13 RX ADMIN — HEPARIN SODIUM 5000 UNITS: 5000 INJECTION INTRAVENOUS; SUBCUTANEOUS at 05:01

## 2024-01-13 RX ADMIN — FUROSEMIDE 20 MG/HR: 10 INJECTION, SOLUTION INTRAMUSCULAR; INTRAVENOUS at 03:01

## 2024-01-13 RX ADMIN — ALUMINUM HYDROXIDE, MAGNESIUM HYDROXIDE, AND DIMETHICONE 10 ML: 400; 400; 40 SUSPENSION ORAL at 08:01

## 2024-01-13 RX ADMIN — EMPAGLIFLOZIN 10 MG: 10 TABLET, FILM COATED ORAL at 08:01

## 2024-01-13 RX ADMIN — AMIODARONE HYDROCHLORIDE 200 MG: 200 TABLET ORAL at 08:01

## 2024-01-13 RX ADMIN — ASPIRIN 325 MG: 325 TABLET, COATED ORAL at 08:01

## 2024-01-13 RX ADMIN — HEPARIN SODIUM 5000 UNITS: 5000 INJECTION INTRAVENOUS; SUBCUTANEOUS at 03:01

## 2024-01-13 RX ADMIN — HEPARIN SODIUM 5000 UNITS: 5000 INJECTION INTRAVENOUS; SUBCUTANEOUS at 09:01

## 2024-01-13 RX ADMIN — SPIRONOLACTONE 25 MG: 25 TABLET ORAL at 08:01

## 2024-01-13 RX ADMIN — PRAVASTATIN SODIUM 80 MG: 40 TABLET ORAL at 08:01

## 2024-01-13 RX ADMIN — ALUMINUM HYDROXIDE, MAGNESIUM HYDROXIDE, AND DIMETHICONE 10 ML: 400; 400; 40 SUSPENSION ORAL at 04:01

## 2024-01-13 RX ADMIN — POTASSIUM BICARBONATE 25 MEQ: 978 TABLET, EFFERVESCENT ORAL at 08:01

## 2024-01-13 RX ADMIN — SENNOSIDES AND DOCUSATE SODIUM 1 TABLET: 8.6; 5 TABLET ORAL at 08:01

## 2024-01-13 RX ADMIN — ALUMINUM HYDROXIDE, MAGNESIUM HYDROXIDE, AND DIMETHICONE 10 ML: 400; 400; 40 SUSPENSION ORAL at 01:01

## 2024-01-13 RX ADMIN — POTASSIUM BICARBONATE 25 MEQ: 978 TABLET, EFFERVESCENT ORAL at 09:01

## 2024-01-13 NOTE — ASSESSMENT & PLAN NOTE
Chronic, controlled. Latest blood pressure and vitals reviewed-     Temp:  [96.4 °F (35.8 °C)-98.2 °F (36.8 °C)]   Pulse:  [68-92]   Resp:  [13-22]   BP: ()/(58-85)   SpO2:  [93 %-100 %] .   Home meds for hypertension were reviewed and noted below.   Hypertension Medications               furosemide (LASIX) 80 MG tablet TAKE 1 TABLET EVERY DAY    metoprolol succinate (TOPROL-XL) 50 MG 24 hr tablet TAKE 1 TABLET EVERY DAY    spironolactone (ALDACTONE) 25 MG tablet TAKE 1/2 TABLET (12.5 MG TOTAL) ONCE DAILY. HOLD IF SYSTOLIC BLOOD PRESSURE IS LESS THAN 110            While in the hospital, will manage blood pressure as follows; Adjust home antihypertensive regimen as follows- Holding in setting of borderline pressures in setting of new diuresis     Will utilize p.r.n. blood pressure medication only if patient's blood pressure greater than 180/110 and he develops symptoms such as worsening chest pain or shortness of breath.

## 2024-01-13 NOTE — SUBJECTIVE & OBJECTIVE
Interval History: NAEO. Complained of some shortness of breath overnight, placed on 2L O2, not hypoxic. AF, HDS. Improving leg edema, appears to be approaching euvolemia. Dobutamine gtt now transfusing through tunneled line. No changes to diuresis.    Objective:     Vital Signs (Most Recent):  Temp: 96.4 °F (35.8 °C) (01/13/24 0743)  Pulse: 75 (01/13/24 0743)  Resp: 20 (01/13/24 0743)  BP: 103/66 (01/13/24 0807)  SpO2: 100 % (01/13/24 0743) Vital Signs (24h Range):  Temp:  [96.4 °F (35.8 °C)-98.2 °F (36.8 °C)] 96.4 °F (35.8 °C)  Pulse:  [68-92] 75  Resp:  [13-22] 20  SpO2:  [93 %-100 %] 100 %  BP: ()/(58-85) 103/66     Weight: 98.6 kg (217 lb 6 oz)  Body mass index is 25.78 kg/m².    Intake/Output Summary (Last 24 hours) at 1/13/2024 0855  Last data filed at 1/13/2024 0508  Gross per 24 hour   Intake 240 ml   Output 3350 ml   Net -3110 ml         Physical Exam  Vitals and nursing note reviewed.   Constitutional:       Appearance: He is ill-appearing.   HENT:      Head: Normocephalic and atraumatic.      Mouth/Throat:      Mouth: Mucous membranes are dry.   Eyes:      General: Scleral icterus present.      Extraocular Movements: Extraocular movements intact.      Comments: Anisocoria   Neck:      Vascular: JVD (7cm) present.   Cardiovascular:      Rate and Rhythm: Normal rate and regular rhythm.      Heart sounds:      Gallop present.   Pulmonary:      Effort: Pulmonary effort is normal. No respiratory distress.      Breath sounds: Rales present. No wheezing.   Abdominal:      General: Abdomen is flat. There is no distension.      Palpations: Abdomen is soft.      Tenderness: There is no abdominal tenderness.   Musculoskeletal:         General: Tenderness (b/l LE) present.      Right lower leg: Edema (improving) present.      Left lower leg: Edema (improving) present.   Skin:     General: Skin is warm and dry.   Neurological:      General: No focal deficit present.      Mental Status: He is alert and oriented  to person, place, and time.      Motor: Weakness present.   Psychiatric:         Mood and Affect: Mood normal.         Behavior: Behavior normal.             Significant Labs: All pertinent labs within the past 24 hours have been reviewed.    Significant Imaging: I have reviewed all pertinent imaging results/findings within the past 24 hours.

## 2024-01-13 NOTE — ASSESSMENT & PLAN NOTE
Creatinine 2.1 on admit, baseline around 1.4. Likely prerenal etiology given urine sodium and FENa vs progression of CKD. Improving with increased diuresis     Recent Labs     01/12/24  0651 01/12/24  1555 01/13/24  0425   BUN 42* 41* 40*   CREATININE 1.3 1.2 1.3         Estimated Creatinine Clearance: 68.5 mL/min (based on SCr of 1.3 mg/dL).  RESOLVED    - Renal US: renal cysts, no hydronephrosis  - Urine Na: 14, Pr/Cr: 0.72; FENa 0.2%  - Monitor urine output and serial BMP and adjust therapy as needed.   - Strict I&Os and daily weights   - Avoid nephrotoxic agents such as NSAIDs, gadolinium and IV radiocontrast.  - Renally dose meds to current GFR.  - Maintain MAP > 65.  - Nephrology referral outpatient

## 2024-01-13 NOTE — ASSESSMENT & PLAN NOTE
Patient is identified as having Combined Systolic and Diastolic heart failure that is Acute on chronic. CHF is currently uncontrolled due to Pulmonary edema/pleural effusion on CXR. Latest ECHO performed and demonstrates- Results for orders placed during the hospital encounter of 11/10/23    Echo    Interpretation Summary    Left Ventricle: The left ventricle is severely dilated. Mildly increased wall thickness. There is mild concentric hypertrophy. Severe global hypokinesis present. There is severely reduced systolic function with a visually estimated ejection fraction of 10 -15%. Grade III diastolic dysfunction.    Right Ventricle: Severe right ventricular enlargement. Systolic function is severely reduced.    Mitral Valve: There is no stenosis. There is mild to moderate regurgitation with a centrally directed jet.    Tricuspid Valve: There is mild to moderate regurgitation.    Pulmonary Artery: The estimated pulmonary artery systolic pressure is 67 mmHg.  .Last BNP reviewed- and noted below   Recent Labs   Lab 01/07/24  0755   BNP 2,503*         Presented for acute on chronic SOB with associated low UOP. Afebrile HDS. BNP 3,067, Troponin 0.031. CXR with cardiomegaly, vascular congestion, and edema. Received 100 of Lasix in the ED.    - Lasix and dobutamine gtt  - Cardiology following  - RIP positive for Covid, treatment completed.  - continue home Jardiance   - Spironolactone at 25mg qd  - Restart BB as tolerated  - Documented allergy to ACE/ARBs  - Cardiac diet with Fluid restriction at 1.5L with strict I/Os and daily STANDING weights  - Check Electrolytes, keep Mag >2 & K+ >4  - SCDs, Ambulate as tolerated    - Strict I&Os, Daily standing weights  - Review Low-salt diet and enforce medication adherence on discharge

## 2024-01-13 NOTE — PLAN OF CARE
Patient doing well today. Up in the chair most of the morning. Patient maintained on lasix drip and Dobutamine drip 2.5 mcg/kg/min. Patient complained of shortness of breath in the early part of the shift. SpO2 98% at the time. Patient asked for oxygen via nasal cannula for comfort. 2 LPM was applied and now off. Most recent SpO2 100% on room air. No complaints of pain or discomfort. Bilateral wound dressing intact with no change noted. Patient is aware of plan and status.   Problem: Adult Inpatient Plan of Care  Goal: Plan of Care Review  Outcome: Ongoing, Progressing  Goal: Patient-Specific Goal (Individualized)  Outcome: Ongoing, Progressing  Goal: Absence of Hospital-Acquired Illness or Injury  Outcome: Ongoing, Progressing  Goal: Optimal Comfort and Wellbeing  Outcome: Ongoing, Progressing  Goal: Readiness for Transition of Care  Outcome: Ongoing, Progressing     Problem: Fluid and Electrolyte Imbalance (Acute Kidney Injury/Impairment)  Goal: Fluid and Electrolyte Balance  Outcome: Ongoing, Progressing     Problem: Oral Intake Inadequate (Acute Kidney Injury/Impairment)  Goal: Optimal Nutrition Intake  Outcome: Ongoing, Progressing     Problem: Renal Function Impairment (Acute Kidney Injury/Impairment)  Goal: Effective Renal Function  Outcome: Ongoing, Progressing     Problem: Impaired Wound Healing  Goal: Optimal Wound Healing  Outcome: Ongoing, Progressing     Problem: Adjustment to Illness (Heart Failure)  Goal: Optimal Coping  Outcome: Ongoing, Progressing     Problem: Cardiac Output Decreased (Heart Failure)  Goal: Optimal Cardiac Output  Outcome: Ongoing, Progressing     Problem: Dysrhythmia (Heart Failure)  Goal: Stable Heart Rate and Rhythm  Outcome: Ongoing, Progressing     Problem: Fluid Imbalance (Heart Failure)  Goal: Fluid Balance  Outcome: Ongoing, Progressing     Problem: Functional Ability Impaired (Heart Failure)  Goal: Optimal Functional Ability  Outcome: Ongoing, Progressing     Problem: Oral  Intake Inadequate (Heart Failure)  Goal: Optimal Nutrition Intake  Outcome: Ongoing, Progressing     Problem: Respiratory Compromise (Heart Failure)  Goal: Effective Oxygenation and Ventilation  Outcome: Ongoing, Progressing     Problem: Sleep Disordered Breathing (Heart Failure)  Goal: Effective Breathing Pattern During Sleep  Outcome: Ongoing, Progressing     Problem: Skin Injury Risk Increased  Goal: Skin Health and Integrity  Outcome: Ongoing, Progressing     Problem: Infection  Goal: Absence of Infection Signs and Symptoms  Outcome: Ongoing, Progressing     Problem: Coping Ineffective  Goal: Effective Coping  Outcome: Ongoing, Progressing

## 2024-01-13 NOTE — PLAN OF CARE
Problem: Adult Inpatient Plan of Care  Goal: Plan of Care Review  Outcome: Ongoing, Progressing  Goal: Readiness for Transition of Care  Outcome: Ongoing, Progressing     Problem: Fluid and Electrolyte Imbalance (Acute Kidney Injury/Impairment)  Goal: Fluid and Electrolyte Balance  Outcome: Ongoing, Progressing     Problem: Impaired Wound Healing  Goal: Optimal Wound Healing  Outcome: Ongoing, Progressing     Problem: Adjustment to Illness (Heart Failure)  Goal: Optimal Coping  Outcome: Ongoing, Progressing     Problem: Fluid Imbalance (Heart Failure)  Goal: Fluid Balance  Outcome: Ongoing, Progressing

## 2024-01-13 NOTE — NURSING
Nurses Note -- 4 Eyes      7: 00 PM      Skin assessed during: Q Shift Change      [] No Altered Skin Integrity Present    []Prevention Measures Documented      [x] Yes- Altered Skin Integrity Present or Discovered   [] LDA Added if Not in Epic (Describe Wound)   [] New Altered Skin Integrity was Present on Admit and Documented in LDA   [] Wound Image Taken    Wound Care Consulted? No    Attending Nurse:  Nelly Lombardo RN    Second RN/Staff Member:  LAUREN Mahajan

## 2024-01-13 NOTE — PROGRESS NOTES
[facility]  Progress Note    Patient Name: Papito Bhakta  MRN: [id number]  Patient Class: IP- Inpatient   Admission Date: 12/29/2023  Length of Stay: 11 days  Attending Physician: Cayetano Somers MD  Primary Care Provider: Brynn To MD        Subjective:     Principal Problem:Serum total bilirubin elevated        HPI:  Papito Bhakta is a 68 y.o. male with NICM, combined CHF(11/2023 EF 10 -15%, G3DD) s/p ICD placement, CKD, HLD, HTN, and AMELIA who presents for bilateral lower extremity swelling and shortness of breath. Patient reports he has been having worsening shortness of breath for the past 6 months. He had acutely worsening SOB today where he described becoming profoundly dyspneic on exertion. He reported taking 2 of his 80 mg Lasix this morning with subsequent minimal urine output and minimal improvement in his SOB. He reports SOB aggravated with lying down and he requires frequent positional changes to keep comfortable. He reports additional poor appetite and urine output for the past week although he reports he has been drinking well. He had 2 episodes of nausea/vomiting this past week as well. He denies fever/chills, chest pain, abdominal pain, recent illness, medication changes. Patient reports adherence with all medications. He reports he lives with his daughter who helps him with his medications and healthcare.    Additionally he reports chronic leg pain from a chronic RLE wound. He went to wound care yesterday where dressings were changed and he was told they were healing well. He has bilateral lower extremity edema R>L that is typical for him and he denies recent worsening.    Overview/Hospital Course:  Pt admitted to hospital medicine for acute heart failure exacerbation and inability to swallow solids for 3 months duration. Initiated on IV lasix. SLP evaluated the patient and determined that he could tolerate liquids. He had a new leukocytosis noted on  "12/31, work up significant for COVID positive. He completed remdesivir, steroids held in order to prevent worsening fluid retention, and was able to be weaned to room oxygen by 1/4. Overnight 12/31, patient becane tachycardic and hypotensive (asymptomatic). Cardiology called and concluded that he was in atrial fibrillation with RVR and he was briefly changed from oral amiodarone to IV amiodarone for one day before resuming his oral dosing. PM interrogated, noted to be functioning accurately. Cardiology signed off. IV lasix increased from pushes to gtt. Urine output did not significantly increase, bladder scan with 325ml urine. Mcmahon placed.     GI was consulted for his ongoing dysphagia. With his ongoing aggressive diuresis, COVID infection, and episode of hemodynamic stability, the EGD was deferred until he becomes more euvolemic.     Transient episode of increased respiratory distress 1/6. EKG without significant changes from prior. Troponin negative. CXR showed stability from prior imaging.    During his admission, noted to have chronic bilirubinemia, assumed likely secondary to congestive hepatopathy. RUQ US without biliary obstruction. Hemolysis labs negative. Tbili continued to uptrend despite improvement in fluid status and renal function. Also noted to have worsening thrombocytopenia, despite normal LFTs. Hepatology consulted, who agree with congestive hepatopathy. Further work up pending. He has chronic lower extremity leg wounds secondary to peripheral vascular disease. Wound care was consulted and assisted in managing the wounds during his admission. He has outpatient follow-up scheduled with vascular surgery at the end of February. Cardiology recommending aggressive diuresis, started him on a dobutamine gtt and continued lasix gtt.    1/10 expressing desire to stop further treatment and to discharge to home. States he wishes to live comfortably at home and "if it's my time it's my time". Palliative care " consulted. 1/11 expressing desire to continue treatment and change back to full code.    Tunneled line placed for dobutamine infusion.    Interval History: NAEO. Complained of some shortness of breath overnight, placed on 2L O2, not hypoxic. AF, HDS. Improving leg edema, appears to be approaching euvolemia. Dobutamine gtt now transfusing through tunneled line. No changes to diuresis.    Objective:     Vital Signs (Most Recent):  Temp: 96.4 °F (35.8 °C) (01/13/24 0743)  Pulse: 75 (01/13/24 0743)  Resp: 20 (01/13/24 0743)  BP: 103/66 (01/13/24 0807)  SpO2: 100 % (01/13/24 0743) Vital Signs (24h Range):  Temp:  [96.4 °F (35.8 °C)-98.2 °F (36.8 °C)] 96.4 °F (35.8 °C)  Pulse:  [68-92] 75  Resp:  [13-22] 20  SpO2:  [93 %-100 %] 100 %  BP: ()/(58-85) 103/66     Weight: 98.6 kg (217 lb 6 oz)  Body mass index is 25.78 kg/m².    Intake/Output Summary (Last 24 hours) at 1/13/2024 0855  Last data filed at 1/13/2024 0508  Gross per 24 hour   Intake 240 ml   Output 3350 ml   Net -3110 ml         Physical Exam  Vitals and nursing note reviewed.   Constitutional:       Appearance: He is ill-appearing.   HENT:      Head: Normocephalic and atraumatic.      Mouth/Throat:      Mouth: Mucous membranes are dry.   Eyes:      General: Scleral icterus present.      Extraocular Movements: Extraocular movements intact.      Comments: Anisocoria   Neck:      Vascular: JVD (7cm) present.   Cardiovascular:      Rate and Rhythm: Normal rate and regular rhythm.      Heart sounds:      Gallop present.   Pulmonary:      Effort: Pulmonary effort is normal. No respiratory distress.      Breath sounds: Rales present. No wheezing.   Abdominal:      General: Abdomen is flat. There is no distension.      Palpations: Abdomen is soft.      Tenderness: There is no abdominal tenderness.   Musculoskeletal:         General: Tenderness (b/l LE) present.      Right lower leg: Edema (improving) present.      Left lower leg: Edema (improving) present.  "  Skin:     General: Skin is warm and dry.   Neurological:      General: No focal deficit present.      Mental Status: He is alert and oriented to person, place, and time.      Motor: Weakness present.   Psychiatric:         Mood and Affect: Mood normal.         Behavior: Behavior normal.             Significant Labs: All pertinent labs within the past 24 hours have been reviewed.    Significant Imaging: I have reviewed all pertinent imaging results/findings within the past 24 hours.    Assessment/Plan:      * Serum total bilirubin elevated  Continues to increase. Direct bilirubinemia. No signs of hemolysis. Likely 2/2 to congestive hepatopathy.     - Continuing to diurese  - Daily CMPs  - Appears chronic since November. Previously reported as secondary to congestive hepatopathy however remaining LFTs likely WNL.   - US Liver with doppler showing: Bidirectional portal vein flow may be seen with right heart failure, tricuspid regurgitation, or portal hypertension. Evidence of volume overload with distended IVC and hepatic veins.  Pulsatile flow through the hepatic veins (also possibly suggesting tricuspid regurgitation). Suspected hepatic steatosis. No evidence of biliary obstruction.  Possible genetic condition      ACP (advance care planning)  1/10, patient stating desire to go home. He is tired of his prolonged hospital course and feeling defeated at the slow trajectory of his remaining course. States he wants to be comfortable at home, "if it's my time it's my time". We discussed benefits of staying, include improved quality of life if we are able to medically optimize  Palliative care consulted, greatly appreciate assistance. With this conversation, along with conversations with family, he has agreed to stay for now. Will continue to engage in GOC conversations.     DNR order placed per palliative  Need to assess utility of ICD    1/11, patient now stating that he would like all interventions done if needed and " does not want ICD turned off  Order placed to make patient Full Code       CKD (chronic kidney disease)  Creatine stable for now. BMP reviewed- noted Estimated Creatinine Clearance: 68.5 mL/min (based on SCr of 1.3 mg/dL). according to latest data. Based on current GFR, CKD stage is stage 3 - GFR 30-59.  Monitor UOP and serial BMP and adjust therapy as needed. Renally dose meds. Avoid nephrotoxic medications and procedures.    COVID-19  COVID positive, noted 12/31    - Finished remdesivir course 01/02/24    Microcytic anemia  Iron studies notable for Fe 26 and sat iron 8%. TIBC, ferritin, transferrin wnl. Likely iron deficiency anemia.    - Daily CBCs  - Feraheme given    Dysphagia  Reports a 3 month history of inability to tolerate solid foods. Notable vomiting immediatly with swallowing food/liquid. No complaints of nausea. SLP has assessed him, can tolerate liquids.     - Continue liquid diet, Boost TID  - GI cancelled EGD and signing off. Will re-consult once patient is more euvolemic and hemodynamically stable.       Pupil asymmetry  Notable asymetry on pupils by patient. Chart review shows left orbital trauma in 2021 with notes concerned for dilation and vision changes. When talking to daughter, this is chronic. States no vision changes or neuro symptoms whatsoever.     Acute on chronic combined systolic and diastolic CHF (congestive heart failure)  Patient is identified as having Combined Systolic and Diastolic heart failure that is Acute on chronic. CHF is currently uncontrolled due to Pulmonary edema/pleural effusion on CXR. Latest ECHO performed and demonstrates- Results for orders placed during the hospital encounter of 11/10/23    Echo    Interpretation Summary    Left Ventricle: The left ventricle is severely dilated. Mildly increased wall thickness. There is mild concentric hypertrophy. Severe global hypokinesis present. There is severely reduced systolic function with a visually estimated ejection  fraction of 10 -15%. Grade III diastolic dysfunction.    Right Ventricle: Severe right ventricular enlargement. Systolic function is severely reduced.    Mitral Valve: There is no stenosis. There is mild to moderate regurgitation with a centrally directed jet.    Tricuspid Valve: There is mild to moderate regurgitation.    Pulmonary Artery: The estimated pulmonary artery systolic pressure is 67 mmHg.  .Last BNP reviewed- and noted below   Recent Labs   Lab 01/07/24  0755   BNP 2,503*         Presented for acute on chronic SOB with associated low UOP. Afebrile HDS. BNP 3,067, Troponin 0.031. CXR with cardiomegaly, vascular congestion, and edema. Received 100 of Lasix in the ED.    - Lasix and dobutamine gtt  - Cardiology following  - RIP positive for Covid, treatment completed.  - continue home Jardiance   - Spironolactone at 25mg qd  - Restart BB as tolerated  - Documented allergy to ACE/ARBs  - Cardiac diet with Fluid restriction at 1.5L with strict I/Os and daily STANDING weights  - Check Electrolytes, keep Mag >2 & K+ >4  - SCDs, Ambulate as tolerated    - Strict I&Os, Daily standing weights  - Review Low-salt diet and enforce medication adherence on discharge    Acute renal failure superimposed on stage 3a chronic kidney disease  Creatinine 2.1 on admit, baseline around 1.4. Likely prerenal etiology given urine sodium and FENa vs progression of CKD. Improving with increased diuresis     Recent Labs     01/12/24  0651 01/12/24  1555 01/13/24  0425   BUN 42* 41* 40*   CREATININE 1.3 1.2 1.3         Estimated Creatinine Clearance: 68.5 mL/min (based on SCr of 1.3 mg/dL).  RESOLVED    - Renal US: renal cysts, no hydronephrosis  - Urine Na: 14, Pr/Cr: 0.72; FENa 0.2%  - Monitor urine output and serial BMP and adjust therapy as needed.   - Strict I&Os and daily weights   - Avoid nephrotoxic agents such as NSAIDs, gadolinium and IV radiocontrast.  - Renally dose meds to current GFR.  - Maintain MAP > 65.  - Nephrology  referral outpatient    AMELIA (obstructive sleep apnea)  Carried diagnosis of AMELIA per chart, however he does not sleep with CPAP at home and declines while here      Peripheral vascular disease, unspecified  Patient with chronic lower leg wounds that recently established with wound care. He is set to follow up with vascular surgery outpatient at the end of February.    Appreciate wound care recommendations  Continue diuresis       NICM (nonischemic cardiomyopathy)  ASA 325mg qd per home medication list. Reason for this dose unclear.      Biventricular ICD (implantable cardioverter-defibrillator) in place  Stable on admission, no acute issues, he denies discharge. QTc chronically prolonged. Continue home amiodarone for NSVT prevention. Monitor on telemetry     - 12/31, noted to have AF RVR with hypotension. Started on amio gtt. Per report, pacemaker malfunctioning.  - Restarted on home oral amio  - Device interrogated, no complications. EP has signed off    Hyperlipidemia  Stable. Continue Home Pravastatin.        Acute hypoxemic respiratory failure  Patient with Hypoxic Respiratory failure which is Acute.  he is not on home oxygen. Supplemental oxygen was provided and noted-      Patient with persistent O2 requirement of 2-3 LPM. Largest contribution likely due to fluid overload in setting of heart failure exacerbation and possible urinary retention. Also found to be COVID positive, s/p course of remdesivir. With his persistent hypoxia, possible that he is developing a post viral pneumonia, or an aspiration pneumonia in setting of dysphagia.   Troponin and repeat EKG WNL, concern for ACS low.   He does have baseline arrhythmia with PM in place, recently interrogated. No AS on recent TTE.    - Continue lasix gtt (see acute on chronic HF) and dobutamine gtt  - Tunneled line placed 1/12/24 for dobutamine infusion  - Adding GDMT as tolerated  - Wean O2 as tolerated  - BNP repeated and still elevated at 2503.  - Consulting  cardiology for failure to improve despite diuresis  - PT/OT consulted    Essential hypertension  Chronic, controlled. Latest blood pressure and vitals reviewed-     Temp:  [96.4 °F (35.8 °C)-98.2 °F (36.8 °C)]   Pulse:  [68-92]   Resp:  [13-22]   BP: ()/(58-85)   SpO2:  [93 %-100 %] .   Home meds for hypertension were reviewed and noted below.   Hypertension Medications               furosemide (LASIX) 80 MG tablet TAKE 1 TABLET EVERY DAY    metoprolol succinate (TOPROL-XL) 50 MG 24 hr tablet TAKE 1 TABLET EVERY DAY    spironolactone (ALDACTONE) 25 MG tablet TAKE 1/2 TABLET (12.5 MG TOTAL) ONCE DAILY. HOLD IF SYSTOLIC BLOOD PRESSURE IS LESS THAN 110            While in the hospital, will manage blood pressure as follows; Adjust home antihypertensive regimen as follows- Holding in setting of borderline pressures in setting of new diuresis     Will utilize p.r.n. blood pressure medication only if patient's blood pressure greater than 180/110 and he develops symptoms such as worsening chest pain or shortness of breath.      VTE Risk Mitigation (From admission, onward)           Ordered     heparin (porcine) injection 5,000 Units  Every 8 hours         12/29/23 1759     IP VTE HIGH RISK PATIENT  Once         12/29/23 1759     Place sequential compression device  Until discontinued         12/29/23 1759                    Discharge Planning   MARIIA: 1/16/2024     Code Status: Full Code   Is the patient medically ready for discharge?: No    Reason for patient still in hospital (select all that apply): Treatment  Discharge Plan A: Home with family, Home Health                  Deepa Holly MD  Department of Hospital Medicine   [facility]

## 2024-01-13 NOTE — ASSESSMENT & PLAN NOTE
Patient with Hypoxic Respiratory failure which is Acute.  he is not on home oxygen. Supplemental oxygen was provided and noted-      Patient with persistent O2 requirement of 2-3 LPM. Largest contribution likely due to fluid overload in setting of heart failure exacerbation and possible urinary retention. Also found to be COVID positive, s/p course of remdesivir. With his persistent hypoxia, possible that he is developing a post viral pneumonia, or an aspiration pneumonia in setting of dysphagia.   Troponin and repeat EKG WNL, concern for ACS low.   He does have baseline arrhythmia with PM in place, recently interrogated. No AS on recent TTE.    - Continue lasix gtt (see acute on chronic HF) and dobutamine gtt  - Tunneled line placed 1/12/24 for dobutamine infusion  - Adding GDMT as tolerated  - Wean O2 as tolerated  - BNP repeated and still elevated at 2503.  - Consulting cardiology for failure to improve despite diuresis  - PT/OT consulted

## 2024-01-13 NOTE — PROCEDURES
IR procedure note        Pre Op Diagnosis:  heart failure  Post Op Diagnosis: Same     Procedure:  Tunneled central catheter placement     Procedure performed by:  Ángel Bernstein MD /  Luann Cote MD     Written Informed Consent Obtained: Yes  Specimen Removed: No  Estimated Blood Loss: Minimal     Findings:     Successful placement of 6 Fr double lumen tunneled catheter in the left IJ     Patient tolerated procedure well.     Recommendations:    Catheter can be used immediately.     Ángel Bernstein MD MSCR  PGY-5 Radiology Resident

## 2024-01-13 NOTE — ASSESSMENT & PLAN NOTE
Continues to increase. Direct bilirubinemia. No signs of hemolysis. Likely 2/2 to congestive hepatopathy.     - Continuing to diurese  - Daily CMPs  - Appears chronic since November. Previously reported as secondary to congestive hepatopathy however remaining LFTs likely WNL.   - US Liver with doppler showing: Bidirectional portal vein flow may be seen with right heart failure, tricuspid regurgitation, or portal hypertension. Evidence of volume overload with distended IVC and hepatic veins.  Pulsatile flow through the hepatic veins (also possibly suggesting tricuspid regurgitation). Suspected hepatic steatosis. No evidence of biliary obstruction.  Possible genetic condition

## 2024-01-14 LAB
ALBUMIN SERPL BCP-MCNC: 2.2 G/DL (ref 3.5–5.2)
ALP SERPL-CCNC: 133 U/L (ref 55–135)
ALT SERPL W/O P-5'-P-CCNC: 27 U/L (ref 10–44)
ANION GAP SERPL CALC-SCNC: 10 MMOL/L (ref 8–16)
ANION GAP SERPL CALC-SCNC: 14 MMOL/L (ref 8–16)
AST SERPL-CCNC: 42 U/L (ref 10–40)
BILIRUB SERPL-MCNC: 8.7 MG/DL (ref 0.1–1)
BUN SERPL-MCNC: 37 MG/DL (ref 8–23)
BUN SERPL-MCNC: 44 MG/DL (ref 8–23)
CALCIUM SERPL-MCNC: 9.1 MG/DL (ref 8.7–10.5)
CALCIUM SERPL-MCNC: 9.4 MG/DL (ref 8.7–10.5)
CHLORIDE SERPL-SCNC: 95 MMOL/L (ref 95–110)
CHLORIDE SERPL-SCNC: 95 MMOL/L (ref 95–110)
CO2 SERPL-SCNC: 30 MMOL/L (ref 23–29)
CO2 SERPL-SCNC: 33 MMOL/L (ref 23–29)
CREAT SERPL-MCNC: 1.3 MG/DL (ref 0.5–1.4)
CREAT SERPL-MCNC: 1.3 MG/DL (ref 0.5–1.4)
ERYTHROCYTE [DISTWIDTH] IN BLOOD BY AUTOMATED COUNT: 24.9 % (ref 11.5–14.5)
EST. GFR  (NO RACE VARIABLE): 59.8 ML/MIN/1.73 M^2
EST. GFR  (NO RACE VARIABLE): 59.8 ML/MIN/1.73 M^2
GLUCOSE SERPL-MCNC: 103 MG/DL (ref 70–110)
GLUCOSE SERPL-MCNC: 99 MG/DL (ref 70–110)
HCT VFR BLD AUTO: 37.1 % (ref 40–54)
HGB BLD-MCNC: 12.2 G/DL (ref 14–18)
MAGNESIUM SERPL-MCNC: 2.5 MG/DL (ref 1.6–2.6)
MCH RBC QN AUTO: 23.8 PG (ref 27–31)
MCHC RBC AUTO-ENTMCNC: 32.9 G/DL (ref 32–36)
MCV RBC AUTO: 72 FL (ref 82–98)
PHOSPHATE SERPL-MCNC: 2.6 MG/DL (ref 2.7–4.5)
PLATELET # BLD AUTO: 88 K/UL (ref 150–450)
PMV BLD AUTO: ABNORMAL FL (ref 9.2–12.9)
POTASSIUM SERPL-SCNC: 4.2 MMOL/L (ref 3.5–5.1)
POTASSIUM SERPL-SCNC: 4.6 MMOL/L (ref 3.5–5.1)
PROT SERPL-MCNC: 7 G/DL (ref 6–8.4)
RBC # BLD AUTO: 5.13 M/UL (ref 4.6–6.2)
SODIUM SERPL-SCNC: 138 MMOL/L (ref 136–145)
SODIUM SERPL-SCNC: 139 MMOL/L (ref 136–145)
WBC # BLD AUTO: 7.65 K/UL (ref 3.9–12.7)

## 2024-01-14 PROCEDURE — 63600175 PHARM REV CODE 636 W HCPCS: Performed by: STUDENT IN AN ORGANIZED HEALTH CARE EDUCATION/TRAINING PROGRAM

## 2024-01-14 PROCEDURE — 63600175 PHARM REV CODE 636 W HCPCS

## 2024-01-14 PROCEDURE — 25000003 PHARM REV CODE 250

## 2024-01-14 PROCEDURE — 84100 ASSAY OF PHOSPHORUS: CPT | Performed by: STUDENT IN AN ORGANIZED HEALTH CARE EDUCATION/TRAINING PROGRAM

## 2024-01-14 PROCEDURE — 80053 COMPREHEN METABOLIC PANEL: CPT | Performed by: STUDENT IN AN ORGANIZED HEALTH CARE EDUCATION/TRAINING PROGRAM

## 2024-01-14 PROCEDURE — 20600001 HC STEP DOWN PRIVATE ROOM

## 2024-01-14 PROCEDURE — 80048 BASIC METABOLIC PNL TOTAL CA: CPT | Mod: XB

## 2024-01-14 PROCEDURE — 36415 COLL VENOUS BLD VENIPUNCTURE: CPT | Performed by: STUDENT IN AN ORGANIZED HEALTH CARE EDUCATION/TRAINING PROGRAM

## 2024-01-14 PROCEDURE — 25000003 PHARM REV CODE 250: Performed by: STUDENT IN AN ORGANIZED HEALTH CARE EDUCATION/TRAINING PROGRAM

## 2024-01-14 PROCEDURE — 83735 ASSAY OF MAGNESIUM: CPT | Performed by: STUDENT IN AN ORGANIZED HEALTH CARE EDUCATION/TRAINING PROGRAM

## 2024-01-14 PROCEDURE — 85027 COMPLETE CBC AUTOMATED: CPT | Performed by: STUDENT IN AN ORGANIZED HEALTH CARE EDUCATION/TRAINING PROGRAM

## 2024-01-14 PROCEDURE — 36415 COLL VENOUS BLD VENIPUNCTURE: CPT | Mod: XB

## 2024-01-14 PROCEDURE — 25000242 PHARM REV CODE 250 ALT 637 W/ HCPCS

## 2024-01-14 PROCEDURE — 27000207 HC ISOLATION

## 2024-01-14 RX ORDER — SODIUM,POTASSIUM PHOSPHATES 280-250MG
2 POWDER IN PACKET (EA) ORAL EVERY 4 HOURS
Status: COMPLETED | OUTPATIENT
Start: 2024-01-14 | End: 2024-01-14

## 2024-01-14 RX ORDER — AMOXICILLIN 250 MG
1 CAPSULE ORAL EVERY OTHER DAY
Status: DISCONTINUED | OUTPATIENT
Start: 2024-01-16 | End: 2024-01-15

## 2024-01-14 RX ADMIN — AMIODARONE HYDROCHLORIDE 200 MG: 200 TABLET ORAL at 10:01

## 2024-01-14 RX ADMIN — ASPIRIN 325 MG: 325 TABLET, COATED ORAL at 10:01

## 2024-01-14 RX ADMIN — ALUMINUM HYDROXIDE, MAGNESIUM HYDROXIDE, AND DIMETHICONE 10 ML: 400; 400; 40 SUSPENSION ORAL at 01:01

## 2024-01-14 RX ADMIN — SENNOSIDES AND DOCUSATE SODIUM 1 TABLET: 8.6; 5 TABLET ORAL at 10:01

## 2024-01-14 RX ADMIN — SPIRONOLACTONE 25 MG: 25 TABLET ORAL at 10:01

## 2024-01-14 RX ADMIN — ALUMINUM HYDROXIDE, MAGNESIUM HYDROXIDE, AND DIMETHICONE 10 ML: 400; 400; 40 SUSPENSION ORAL at 06:01

## 2024-01-14 RX ADMIN — HEPARIN SODIUM 5000 UNITS: 5000 INJECTION INTRAVENOUS; SUBCUTANEOUS at 01:01

## 2024-01-14 RX ADMIN — EMPAGLIFLOZIN 10 MG: 10 TABLET, FILM COATED ORAL at 10:01

## 2024-01-14 RX ADMIN — ALUMINUM HYDROXIDE, MAGNESIUM HYDROXIDE, AND DIMETHICONE 10 ML: 400; 400; 40 SUSPENSION ORAL at 09:01

## 2024-01-14 RX ADMIN — PRAVASTATIN SODIUM 80 MG: 40 TABLET ORAL at 10:01

## 2024-01-14 RX ADMIN — DOBUTAMINE HYDROCHLORIDE 2.5 MCG/KG/MIN: 400 INJECTION INTRAVENOUS at 06:01

## 2024-01-14 RX ADMIN — POTASSIUM & SODIUM PHOSPHATES POWDER PACK 280-160-250 MG 2 PACKET: 280-160-250 PACK at 01:01

## 2024-01-14 RX ADMIN — ALUMINUM HYDROXIDE, MAGNESIUM HYDROXIDE, AND DIMETHICONE 10 ML: 400; 400; 40 SUSPENSION ORAL at 10:01

## 2024-01-14 RX ADMIN — POTASSIUM & SODIUM PHOSPHATES POWDER PACK 280-160-250 MG 2 PACKET: 280-160-250 PACK at 10:01

## 2024-01-14 RX ADMIN — POTASSIUM BICARBONATE 25 MEQ: 978 TABLET, EFFERVESCENT ORAL at 10:01

## 2024-01-14 RX ADMIN — HEPARIN SODIUM 5000 UNITS: 5000 INJECTION INTRAVENOUS; SUBCUTANEOUS at 10:01

## 2024-01-14 RX ADMIN — HEPARIN SODIUM 5000 UNITS: 5000 INJECTION INTRAVENOUS; SUBCUTANEOUS at 06:01

## 2024-01-14 RX ADMIN — FUROSEMIDE 20 MG/HR: 10 INJECTION, SOLUTION INTRAMUSCULAR; INTRAVENOUS at 11:01

## 2024-01-14 NOTE — ASSESSMENT & PLAN NOTE
Chronic, controlled. Latest blood pressure and vitals reviewed-     Temp:  [97.3 °F (36.3 °C)-98.7 °F (37.1 °C)]   Pulse:  [77-91]   Resp:  [14-20]   BP: (90-98)/(53-65)   SpO2:  [93 %-99 %] .   Home meds for hypertension were reviewed and noted below.   Hypertension Medications               furosemide (LASIX) 80 MG tablet TAKE 1 TABLET EVERY DAY    metoprolol succinate (TOPROL-XL) 50 MG 24 hr tablet TAKE 1 TABLET EVERY DAY    spironolactone (ALDACTONE) 25 MG tablet TAKE 1/2 TABLET (12.5 MG TOTAL) ONCE DAILY. HOLD IF SYSTOLIC BLOOD PRESSURE IS LESS THAN 110            While in the hospital, will manage blood pressure as follows; Adjust home antihypertensive regimen as follows- Holding in setting of borderline pressures in setting of new diuresis     Will utilize p.r.n. blood pressure medication only if patient's blood pressure greater than 180/110 and he develops symptoms such as worsening chest pain or shortness of breath.

## 2024-01-14 NOTE — NURSING
Nurses Note -- 4 Eyes      1/13/2024   7: 10 PM      Skin assessed during: Q Shift Change      [] No Altered Skin Integrity Present    []Prevention Measures Documented      [x] Yes- Altered Skin Integrity Present or Discovered   [] LDA Added if Not in Epic (Describe Wound)   [] New Altered Skin Integrity was Present on Admit and Documented in LDA   [] Wound Image Taken    Wound Care Consulted? Yes    Attending Nurse:  Nelly Lombardo RN    Second RN/Staff Member:  Keyshawn Tovar RN

## 2024-01-14 NOTE — TREATMENT PLAN
"Hepatology Treatment Plan    Papito Bhakta is a 68 y.o. male admitted to hospital 12/29/2023 (Hospital Day: 17) due to Serum total bilirubin elevated.     Interval History  Tbili downtrending  Slight increase in AST    Objective  Temp:  [96.5 °F (35.8 °C)-98.7 °F (37.1 °C)] 97.7 °F (36.5 °C) (01/14 0423)  Pulse:  [76-91] 79 (01/14 0600)  BP: (90-98)/(53-65) 95/56 (01/14 0423)  Resp:  [18-20] 20 (01/14 0423)  SpO2:  [93 %-100 %] 93 % (01/14 0423)    Laboratory    Recent Labs   Lab 01/14/24 0442   WBC 7.65   RBC 5.13   HGB 12.2*   HCT 37.1*   PLT 88*   MCV 72*   MCH 23.8*   MCHC 32.9      Recent Labs   Lab 01/14/24 0442   CALCIUM 9.4   ALBUMIN 2.2*   PROT 7.0      K 4.6   CO2 33*   CL 95   BUN 37*   CREATININE 1.3   ALKPHOS 133   ALT 27   AST 42*   BILITOT 8.7*      No results for input(s): "PT", "INR", "APTT" in the last 24 hours.     MELD 3.0: 22 at 12/6/2023  9:32 AM  MELD-Na: 21 at 12/6/2023  9:32 AM  Calculated from:  Serum Creatinine: 2.0 mg/dL at 12/6/2023  9:32 AM  Serum Sodium: 136 mmol/L at 12/6/2023  9:32 AM  Total Bilirubin: 4.0 mg/dL at 12/6/2023  9:32 AM  Serum Albumin: 3.0 g/dL at 12/6/2023  9:32 AM  INR(ratio): 1.2 at 12/6/2023  9:32 AM  Age at listing (hypothetical): 68 years  Sex: Male at 12/6/2023  9:32 AM     ASMA 1:80  NADINE negative  IgG mildly elevated  A1AT negative  Iron studies negative  Ceruloplasmin negative    Assessment and Plan    68 year old M with significant cardiac hx, CHF with EF 10-15% with ICD. Hepatology consulted for elevated T bili. Direct hyperbilirubinemia with no signs of hemolysis. Hyperbilirubinemia noted in November 2023 also when patient was admitted for CHF exacerbation. Liver US done showing: Bidirectional portal vein flow may be seen with right heart failure, tricuspid regurgitation, or portal hypertension. Evidence of volume overload with distended IVC and hepatic veins.  Pulsatile flow through the hepatic veins (also possibly suggesting tricuspid " regurgitation). Suspected hepatic steatosis. No evidence of biliary obstruction. Recent hepatitis panel negative. Serologic workup shows mildly positive ASMA, but still suspect elevated Tbili likely 2/2 congestive hepatopathy, now slowly improving with diuresis and ionotropes.    Problem List:  Congestive hepatopathy    Plan:  - Continue to monitor LFTs  - CHF management per primary team  - No plans for liver biopsy      - We continue to follow peripherally.    Thank you for involving us in the care of Papito Bhakta. Please call with any additional questions, concerns or changes in the patient's clinical status. Plan of care discussed with attending Dr. Ferraro.    Jhony oYo MD  Gastroenterology and Hepatology Fellow, PGY V

## 2024-01-14 NOTE — ASSESSMENT & PLAN NOTE
"Patient is identified as having Combined Systolic and Diastolic heart failure that is Acute on chronic. CHF is currently uncontrolled due to Pulmonary edema/pleural effusion on CXR. Latest ECHO performed and demonstrates- Results for orders placed during the hospital encounter of 11/10/23    Echo    Interpretation Summary    Left Ventricle: The left ventricle is severely dilated. Mildly increased wall thickness. There is mild concentric hypertrophy. Severe global hypokinesis present. There is severely reduced systolic function with a visually estimated ejection fraction of 10 -15%. Grade III diastolic dysfunction.    Right Ventricle: Severe right ventricular enlargement. Systolic function is severely reduced.    Mitral Valve: There is no stenosis. There is mild to moderate regurgitation with a centrally directed jet.    Tricuspid Valve: There is mild to moderate regurgitation.    Pulmonary Artery: The estimated pulmonary artery systolic pressure is 67 mmHg.  .Last BNP reviewed- and noted below   No results for input(s): "BNP", "BNPTRIAGEBLO" in the last 168 hours.      Presented for acute on chronic SOB with associated low UOP. Afebrile HDS. BNP 3,067, Troponin 0.031. CXR with cardiomegaly, vascular congestion, and edema. Received 100 of Lasix in the ED.    - Lasix and dobutamine gtt  - RIP positive for Covid, treatment completed.  - continue home Jardiance   - Spironolactone at 25mg qd  - Restart BB as tolerated  - Documented allergy to ACE/ARBs  - Cardiac diet with Fluid restriction at 1.5L with strict I/Os and daily STANDING weights  - Check Electrolytes, keep Mag >2 & K+ >4  - SCDs, Ambulate as tolerated    - Strict I&Os, Daily standing weights  - Review Low-salt diet and enforce medication adherence on discharge  "

## 2024-01-14 NOTE — ASSESSMENT & PLAN NOTE
Stable on admission, no acute issues, he denies discharge. QTc chronically prolonged. Continue home amiodarone for NSVT prevention. Monitor on telemetry     - 12/31, noted to have AF RVR with hypotension. Started on amio gtt. Per report, pacemaker malfunctioning.  - Device interrogated, no complications. EP has signed off and pt is back on home amio

## 2024-01-14 NOTE — ASSESSMENT & PLAN NOTE
Iron studies notable for Fe 26 and sat iron 8%. TIBC, ferritin, transferrin wnl. Likely iron deficiency anemia.    - Daily CBCs  - Feraheme given 1/4/24

## 2024-01-14 NOTE — PLAN OF CARE
Problem: Adult Inpatient Plan of Care  Goal: Plan of Care Review  Outcome: Ongoing, Progressing  Goal: Absence of Hospital-Acquired Illness or Injury  Outcome: Ongoing, Progressing  Goal: Optimal Comfort and Wellbeing  Outcome: Ongoing, Progressing     Problem: Adjustment to Illness (Heart Failure)  Goal: Optimal Coping  Outcome: Ongoing, Progressing     Problem: Fluid Imbalance (Heart Failure)  Goal: Fluid Balance  Outcome: Ongoing, Progressing

## 2024-01-14 NOTE — SUBJECTIVE & OBJECTIVE
Interval History: NAEO. On 2L O2. AF. No complaints this morning.     Objective:     Vital Signs (Most Recent):  Temp: 97.5 °F (36.4 °C) (01/14/24 1246)  Pulse: 81 (01/14/24 1246)  Resp: 18 (01/14/24 1246)  BP: 96/64 (01/14/24 1246)  SpO2: 99 % (01/14/24 1246) Vital Signs (24h Range):  Temp:  [97.3 °F (36.3 °C)-98.7 °F (37.1 °C)] 97.5 °F (36.4 °C)  Pulse:  [77-91] 81  Resp:  [14-20] 18  SpO2:  [93 %-99 %] 99 %  BP: (90-98)/(53-65) 96/64     Weight: 98.6 kg (217 lb 6 oz)  Body mass index is 25.78 kg/m².    Intake/Output Summary (Last 24 hours) at 1/14/2024 1256  Last data filed at 1/14/2024 1028  Gross per 24 hour   Intake 840 ml   Output 2060 ml   Net -1220 ml         Physical Exam  Vitals and nursing note reviewed.   Constitutional:       Appearance: He is ill-appearing.   HENT:      Head: Normocephalic and atraumatic.      Mouth/Throat:      Mouth: Mucous membranes are dry.   Eyes:      General: Scleral icterus present.      Extraocular Movements: Extraocular movements intact.      Comments: Anisocoria   Neck:      Vascular: JVD (6cm) present.   Cardiovascular:      Rate and Rhythm: Normal rate and regular rhythm.      Heart sounds:      Gallop present.   Pulmonary:      Effort: Pulmonary effort is normal. No respiratory distress.      Breath sounds: Normal breath sounds. No wheezing or rales.   Abdominal:      General: Abdomen is flat. There is no distension.      Palpations: Abdomen is soft.      Tenderness: There is no abdominal tenderness.   Musculoskeletal:         General: Tenderness (b/l LE) present.      Right lower leg: Edema (improving) present.      Left lower leg: Edema (improving) present.   Skin:     General: Skin is warm and dry.   Neurological:      General: No focal deficit present.      Mental Status: He is alert and oriented to person, place, and time.      Motor: Weakness present.   Psychiatric:         Mood and Affect: Mood normal.         Behavior: Behavior normal.             Significant  Labs: All pertinent labs within the past 24 hours have been reviewed.    Significant Imaging: I have reviewed all pertinent imaging results/findings within the past 24 hours.

## 2024-01-14 NOTE — ASSESSMENT & PLAN NOTE
Creatinine 2.1 on admit, baseline around 1.4. Likely prerenal etiology given urine sodium and FENa vs progression of CKD. Improving with increased diuresis     Recent Labs     01/12/24  1555 01/13/24  0425 01/14/24  0442   BUN 41* 40* 37*   CREATININE 1.2 1.3 1.3         Estimated Creatinine Clearance: 68.5 mL/min (based on SCr of 1.3 mg/dL).  RESOLVED    - Renal US: renal cysts, no hydronephrosis  - Urine Na: 14, Pr/Cr: 0.72; FENa 0.2%  - Monitor urine output and serial BMP and adjust therapy as needed.   - Strict I&Os and daily weights   - Avoid nephrotoxic agents such as NSAIDs, gadolinium and IV radiocontrast.  - Renally dose meds to current GFR.  - Maintain MAP > 65.  - Nephrology referral outpatient

## 2024-01-14 NOTE — PROGRESS NOTES
[facility]  Progress Note    Patient Name: Papito Bhakta  MRN: [id number]  Patient Class: IP- Inpatient   Admission Date: 12/29/2023  Length of Stay: 12 days  Attending Physician: Cayetano Somers MD  Primary Care Provider: Brynn To MD        Subjective:     Principal Problem:Serum total bilirubin elevated        HPI:  Papito Bhakta is a 68 y.o. male with NICM, combined CHF(11/2023 EF 10 -15%, G3DD) s/p ICD placement, CKD, HLD, HTN, and AMELIA who presents for bilateral lower extremity swelling and shortness of breath. Patient reports he has been having worsening shortness of breath for the past 6 months. He had acutely worsening SOB today where he described becoming profoundly dyspneic on exertion. He reported taking 2 of his 80 mg Lasix this morning with subsequent minimal urine output and minimal improvement in his SOB. He reports SOB aggravated with lying down and he requires frequent positional changes to keep comfortable. He reports additional poor appetite and urine output for the past week although he reports he has been drinking well. He had 2 episodes of nausea/vomiting this past week as well. He denies fever/chills, chest pain, abdominal pain, recent illness, medication changes. Patient reports adherence with all medications. He reports he lives with his daughter who helps him with his medications and healthcare.    Additionally he reports chronic leg pain from a chronic RLE wound. He went to wound care yesterday where dressings were changed and he was told they were healing well. He has bilateral lower extremity edema R>L that is typical for him and he denies recent worsening.    Overview/Hospital Course:  Pt admitted to hospital medicine for acute heart failure exacerbation and inability to swallow solids for 3 months duration. Initiated on IV lasix. SLP evaluated the patient and determined that he could tolerate liquids. He had a new leukocytosis noted on  "12/31, work up significant for COVID positive. He completed remdesivir, steroids held in order to prevent worsening fluid retention, and was able to be weaned to room oxygen by 1/4. Overnight 12/31, patient becane tachycardic and hypotensive (asymptomatic). Cardiology called and concluded that he was in atrial fibrillation with RVR and he was briefly changed from oral amiodarone to IV amiodarone for one day before resuming his oral dosing. PM interrogated, noted to be functioning accurately. Cardiology signed off. IV lasix increased from pushes to gtt. Urine output did not significantly increase, bladder scan with 325ml urine. Mcmahon placed.     GI was consulted for his ongoing dysphagia. With his ongoing aggressive diuresis, COVID infection, and episode of hemodynamic stability, the EGD was deferred until he becomes more euvolemic.     Transient episode of increased respiratory distress 1/6. EKG without significant changes from prior. Troponin negative. CXR showed stability from prior imaging.    During his admission, noted to have chronic bilirubinemia, assumed likely secondary to congestive hepatopathy. RUQ US without biliary obstruction. Hemolysis labs negative. Tbili continued to uptrend despite improvement in fluid status and renal function. Also noted to have worsening thrombocytopenia, despite normal LFTs. Hepatology consulted, who agree with congestive hepatopathy. Further work up pending. He has chronic lower extremity leg wounds secondary to peripheral vascular disease. Wound care was consulted and assisted in managing the wounds during his admission. He has outpatient follow-up scheduled with vascular surgery at the end of February. Cardiology recommending aggressive diuresis, started him on a dobutamine gtt and continued lasix gtt.    1/10 expressing desire to stop further treatment and to discharge to home. States he wishes to live comfortably at home and "if it's my time it's my time". Palliative care " consulted. 1/11 expressing desire to continue treatment and change back to full code.    Tunneled line placed for dobutamine infusion. Continuing diuresis with lasix gtt.    Interval History: NAEO. On 2L O2. AF. No complaints this morning.     Objective:     Vital Signs (Most Recent):  Temp: 97.5 °F (36.4 °C) (01/14/24 1246)  Pulse: 81 (01/14/24 1246)  Resp: 18 (01/14/24 1246)  BP: 96/64 (01/14/24 1246)  SpO2: 99 % (01/14/24 1246) Vital Signs (24h Range):  Temp:  [97.3 °F (36.3 °C)-98.7 °F (37.1 °C)] 97.5 °F (36.4 °C)  Pulse:  [77-91] 81  Resp:  [14-20] 18  SpO2:  [93 %-99 %] 99 %  BP: (90-98)/(53-65) 96/64     Weight: 98.6 kg (217 lb 6 oz)  Body mass index is 25.78 kg/m².    Intake/Output Summary (Last 24 hours) at 1/14/2024 1256  Last data filed at 1/14/2024 1028  Gross per 24 hour   Intake 840 ml   Output 2060 ml   Net -1220 ml         Physical Exam  Vitals and nursing note reviewed.   Constitutional:       Appearance: He is ill-appearing.   HENT:      Head: Normocephalic and atraumatic.      Mouth/Throat:      Mouth: Mucous membranes are dry.   Eyes:      General: Scleral icterus present.      Extraocular Movements: Extraocular movements intact.      Comments: Anisocoria   Neck:      Vascular: JVD (6cm) present.   Cardiovascular:      Rate and Rhythm: Normal rate and regular rhythm.      Heart sounds:      Gallop present.   Pulmonary:      Effort: Pulmonary effort is normal. No respiratory distress.      Breath sounds: Normal breath sounds. No wheezing or rales.   Abdominal:      General: Abdomen is flat. There is no distension.      Palpations: Abdomen is soft.      Tenderness: There is no abdominal tenderness.   Musculoskeletal:         General: Tenderness (b/l LE) present.      Right lower leg: Edema (improving) present.      Left lower leg: Edema (improving) present.   Skin:     General: Skin is warm and dry.   Neurological:      General: No focal deficit present.      Mental Status: He is alert and oriented  "to person, place, and time.      Motor: Weakness present.   Psychiatric:         Mood and Affect: Mood normal.         Behavior: Behavior normal.             Significant Labs: All pertinent labs within the past 24 hours have been reviewed.    Significant Imaging: I have reviewed all pertinent imaging results/findings within the past 24 hours.    Assessment/Plan:      * Serum total bilirubin elevated  Continues to increase. Direct bilirubinemia. No signs of hemolysis. Likely 2/2 to congestive hepatopathy.     - Continuing to diurese  - Daily CMPs  - Appears chronic since November. Previously reported as secondary to congestive hepatopathy however remaining LFTs likely WNL.   - US Liver with doppler showing: Bidirectional portal vein flow may be seen with right heart failure, tricuspid regurgitation, or portal hypertension. Evidence of volume overload with distended IVC and hepatic veins.  Pulsatile flow through the hepatic veins (also possibly suggesting tricuspid regurgitation). Suspected hepatic steatosis. No evidence of biliary obstruction.  Possible genetic condition      ACP (advance care planning)  1/10, patient stating desire to go home. He is tired of his prolonged hospital course and feeling defeated at the slow trajectory of his remaining course. States he wants to be comfortable at home, "if it's my time it's my time". We discussed benefits of staying, include improved quality of life if we are able to medically optimize  Palliative care consulted, greatly appreciate assistance. With this conversation, along with conversations with family, he has agreed to stay for now. Will continue to engage in GOC conversations.     DNR order placed per palliative  Need to assess utility of ICD    1/11, patient now stating that he would like all interventions done if needed and does not want ICD turned off  Order placed to make patient Full Code       CKD (chronic kidney disease)  Creatine stable for now. BMP reviewed- " noted Estimated Creatinine Clearance: 68.5 mL/min (based on SCr of 1.3 mg/dL). according to latest data. Based on current GFR, CKD stage is stage 3 - GFR 30-59.  Monitor UOP and serial BMP and adjust therapy as needed. Renally dose meds. Avoid nephrotoxic medications and procedures.    COVID-19  COVID positive, noted 12/31    - Finished remdesivir course 01/02/24    Microcytic anemia  Iron studies notable for Fe 26 and sat iron 8%. TIBC, ferritin, transferrin wnl. Likely iron deficiency anemia.    - Daily CBCs  - Feraheme given 1/4/24    Dysphagia  Reports a 3 month history of inability to tolerate solid foods. Notable vomiting immediatly with swallowing food/liquid. No complaints of nausea. SLP has assessed him, can tolerate liquids.     - Continue liquid diet, Boost TID  - GI cancelled EGD and signing off. Will re-consult once patient is more euvolemic and hemodynamically stable.       Pupil asymmetry  Notable asymetry on pupils by patient. Chart review shows left orbital trauma in 2021 with notes concerned for dilation and vision changes. When talking to daughter, this is chronic. States no vision changes or neuro symptoms whatsoever.     Acute on chronic combined systolic and diastolic CHF (congestive heart failure)  Patient is identified as having Combined Systolic and Diastolic heart failure that is Acute on chronic. CHF is currently uncontrolled due to Pulmonary edema/pleural effusion on CXR. Latest ECHO performed and demonstrates- Results for orders placed during the hospital encounter of 11/10/23    Echo    Interpretation Summary    Left Ventricle: The left ventricle is severely dilated. Mildly increased wall thickness. There is mild concentric hypertrophy. Severe global hypokinesis present. There is severely reduced systolic function with a visually estimated ejection fraction of 10 -15%. Grade III diastolic dysfunction.    Right Ventricle: Severe right ventricular enlargement. Systolic function is  "severely reduced.    Mitral Valve: There is no stenosis. There is mild to moderate regurgitation with a centrally directed jet.    Tricuspid Valve: There is mild to moderate regurgitation.    Pulmonary Artery: The estimated pulmonary artery systolic pressure is 67 mmHg.  .Last BNP reviewed- and noted below   No results for input(s): "BNP", "BNPTRIAGEBLO" in the last 168 hours.      Presented for acute on chronic SOB with associated low UOP. Afebrile HDS. BNP 3,067, Troponin 0.031. CXR with cardiomegaly, vascular congestion, and edema. Received 100 of Lasix in the ED.    - Lasix and dobutamine gtt  - RIP positive for Covid, treatment completed.  - continue home Jardiance   - Spironolactone at 25mg qd  - Restart BB as tolerated  - Documented allergy to ACE/ARBs  - Cardiac diet with Fluid restriction at 1.5L with strict I/Os and daily STANDING weights  - Check Electrolytes, keep Mag >2 & K+ >4  - SCDs, Ambulate as tolerated    - Strict I&Os, Daily standing weights  - Review Low-salt diet and enforce medication adherence on discharge    Acute renal failure superimposed on stage 3a chronic kidney disease  Creatinine 2.1 on admit, baseline around 1.4. Likely prerenal etiology given urine sodium and FENa vs progression of CKD. Improving with increased diuresis     Recent Labs     01/12/24  1555 01/13/24  0425 01/14/24  0442   BUN 41* 40* 37*   CREATININE 1.2 1.3 1.3         Estimated Creatinine Clearance: 68.5 mL/min (based on SCr of 1.3 mg/dL).  RESOLVED    - Renal US: renal cysts, no hydronephrosis  - Urine Na: 14, Pr/Cr: 0.72; FENa 0.2%  - Monitor urine output and serial BMP and adjust therapy as needed.   - Strict I&Os and daily weights   - Avoid nephrotoxic agents such as NSAIDs, gadolinium and IV radiocontrast.  - Renally dose meds to current GFR.  - Maintain MAP > 65.  - Nephrology referral outpatient    AMELIA (obstructive sleep apnea)  Carried diagnosis of AMELIA per chart, however he does not sleep with CPAP at home " and declines while here      Peripheral vascular disease, unspecified  Patient with chronic lower leg wounds that recently established with wound care. He is set to follow up with vascular surgery outpatient at the end of February.    Appreciate wound care recommendations  Continue diuresis       NICM (nonischemic cardiomyopathy)  ASA 325mg qd per home medication list. Reason for this dose unclear.      Biventricular ICD (implantable cardioverter-defibrillator) in place  Stable on admission, no acute issues, he denies discharge. QTc chronically prolonged. Continue home amiodarone for NSVT prevention. Monitor on telemetry     - 12/31, noted to have AF RVR with hypotension. Started on amio gtt. Per report, pacemaker malfunctioning.  - Device interrogated, no complications. EP has signed off and pt is back on home amio    Hyperlipidemia  Stable. Continue Home Pravastatin.        Acute hypoxemic respiratory failure  Patient with Hypoxic Respiratory failure which is Acute.  he is not on home oxygen. Supplemental oxygen was provided and noted-      Patient with persistent O2 requirement of 2-3 LPM. Largest contribution likely due to fluid overload in setting of heart failure exacerbation and possible urinary retention. Also found to be COVID positive, s/p course of remdesivir. With his persistent hypoxia, possible that he is developing a post viral pneumonia, or an aspiration pneumonia in setting of dysphagia.   Troponin and repeat EKG WNL, concern for ACS low.   He does have baseline arrhythmia with PM in place, recently interrogated. No AS on recent TTE.    - Continue lasix gtt (see acute on chronic HF) and dobutamine gtt  - Tunneled line placed 1/12/24 for dobutamine infusion  - Adding GDMT as tolerated  - Wean O2 as tolerated  - BNP repeated and still elevated at 2503.  - Consulting cardiology for failure to improve despite diuresis  - PT/OT consulted and following    Essential hypertension  Chronic, controlled.  Latest blood pressure and vitals reviewed-     Temp:  [97.3 °F (36.3 °C)-98.7 °F (37.1 °C)]   Pulse:  [77-91]   Resp:  [14-20]   BP: (90-98)/(53-65)   SpO2:  [93 %-99 %] .   Home meds for hypertension were reviewed and noted below.   Hypertension Medications               furosemide (LASIX) 80 MG tablet TAKE 1 TABLET EVERY DAY    metoprolol succinate (TOPROL-XL) 50 MG 24 hr tablet TAKE 1 TABLET EVERY DAY    spironolactone (ALDACTONE) 25 MG tablet TAKE 1/2 TABLET (12.5 MG TOTAL) ONCE DAILY. HOLD IF SYSTOLIC BLOOD PRESSURE IS LESS THAN 110            While in the hospital, will manage blood pressure as follows; Adjust home antihypertensive regimen as follows- Holding in setting of borderline pressures in setting of new diuresis     Will utilize p.r.n. blood pressure medication only if patient's blood pressure greater than 180/110 and he develops symptoms such as worsening chest pain or shortness of breath.      VTE Risk Mitigation (From admission, onward)           Ordered     heparin (porcine) injection 5,000 Units  Every 8 hours         12/29/23 1759     IP VTE HIGH RISK PATIENT  Once         12/29/23 1759     Place sequential compression device  Until discontinued         12/29/23 1759                    Discharge Planning   MARIIA: 1/16/2024     Code Status: Full Code   Is the patient medically ready for discharge?: No    Reason for patient still in hospital (select all that apply): Treatment  Discharge Plan A: Home with family, Home Health                  Deepa Holly MD  Department of Hospital Medicine   [facility] - Cardiology Stepdown

## 2024-01-15 LAB
ALBUMIN SERPL BCP-MCNC: 2.2 G/DL (ref 3.5–5.2)
ALP SERPL-CCNC: 112 U/L (ref 55–135)
ALT SERPL W/O P-5'-P-CCNC: 28 U/L (ref 10–44)
ANION GAP SERPL CALC-SCNC: 11 MMOL/L (ref 8–16)
ANION GAP SERPL CALC-SCNC: 15 MMOL/L (ref 8–16)
AST SERPL-CCNC: 42 U/L (ref 10–40)
BILIRUB SERPL-MCNC: 7.6 MG/DL (ref 0.1–1)
BUN SERPL-MCNC: 41 MG/DL (ref 8–23)
BUN SERPL-MCNC: 42 MG/DL (ref 8–23)
CALCIUM SERPL-MCNC: 9.2 MG/DL (ref 8.7–10.5)
CALCIUM SERPL-MCNC: 9.2 MG/DL (ref 8.7–10.5)
CHLORIDE SERPL-SCNC: 95 MMOL/L (ref 95–110)
CHLORIDE SERPL-SCNC: 97 MMOL/L (ref 95–110)
CO2 SERPL-SCNC: 23 MMOL/L (ref 23–29)
CO2 SERPL-SCNC: 30 MMOL/L (ref 23–29)
CREAT SERPL-MCNC: 1.3 MG/DL (ref 0.5–1.4)
CREAT SERPL-MCNC: 1.3 MG/DL (ref 0.5–1.4)
ERYTHROCYTE [DISTWIDTH] IN BLOOD BY AUTOMATED COUNT: 25.3 % (ref 11.5–14.5)
EST. GFR  (NO RACE VARIABLE): 59.8 ML/MIN/1.73 M^2
EST. GFR  (NO RACE VARIABLE): 59.8 ML/MIN/1.73 M^2
GLUCOSE SERPL-MCNC: 132 MG/DL (ref 70–110)
GLUCOSE SERPL-MCNC: 69 MG/DL (ref 70–110)
HCT VFR BLD AUTO: 39.3 % (ref 40–54)
HGB BLD-MCNC: 12.6 G/DL (ref 14–18)
MAGNESIUM SERPL-MCNC: 2.5 MG/DL (ref 1.6–2.6)
MCH RBC QN AUTO: 23.8 PG (ref 27–31)
MCHC RBC AUTO-ENTMCNC: 32.1 G/DL (ref 32–36)
MCV RBC AUTO: 74 FL (ref 82–98)
PHOSPHATE SERPL-MCNC: 3 MG/DL (ref 2.7–4.5)
PLATELET # BLD AUTO: 76 K/UL (ref 150–450)
PMV BLD AUTO: ABNORMAL FL (ref 9.2–12.9)
POTASSIUM SERPL-SCNC: 4.1 MMOL/L (ref 3.5–5.1)
POTASSIUM SERPL-SCNC: 4.8 MMOL/L (ref 3.5–5.1)
PROT SERPL-MCNC: 6.9 G/DL (ref 6–8.4)
RBC # BLD AUTO: 5.29 M/UL (ref 4.6–6.2)
SODIUM SERPL-SCNC: 135 MMOL/L (ref 136–145)
SODIUM SERPL-SCNC: 136 MMOL/L (ref 136–145)
WBC # BLD AUTO: 7.05 K/UL (ref 3.9–12.7)

## 2024-01-15 PROCEDURE — 94761 N-INVAS EAR/PLS OXIMETRY MLT: CPT

## 2024-01-15 PROCEDURE — 85027 COMPLETE CBC AUTOMATED: CPT | Performed by: STUDENT IN AN ORGANIZED HEALTH CARE EDUCATION/TRAINING PROGRAM

## 2024-01-15 PROCEDURE — 27000221 HC OXYGEN, UP TO 24 HOURS

## 2024-01-15 PROCEDURE — 36415 COLL VENOUS BLD VENIPUNCTURE: CPT | Mod: XB | Performed by: STUDENT IN AN ORGANIZED HEALTH CARE EDUCATION/TRAINING PROGRAM

## 2024-01-15 PROCEDURE — 25000003 PHARM REV CODE 250: Performed by: STUDENT IN AN ORGANIZED HEALTH CARE EDUCATION/TRAINING PROGRAM

## 2024-01-15 PROCEDURE — 63600175 PHARM REV CODE 636 W HCPCS: Performed by: STUDENT IN AN ORGANIZED HEALTH CARE EDUCATION/TRAINING PROGRAM

## 2024-01-15 PROCEDURE — 27000207 HC ISOLATION

## 2024-01-15 PROCEDURE — 36415 COLL VENOUS BLD VENIPUNCTURE: CPT

## 2024-01-15 PROCEDURE — 25000242 PHARM REV CODE 250 ALT 637 W/ HCPCS

## 2024-01-15 PROCEDURE — 83735 ASSAY OF MAGNESIUM: CPT | Performed by: STUDENT IN AN ORGANIZED HEALTH CARE EDUCATION/TRAINING PROGRAM

## 2024-01-15 PROCEDURE — 80053 COMPREHEN METABOLIC PANEL: CPT | Performed by: STUDENT IN AN ORGANIZED HEALTH CARE EDUCATION/TRAINING PROGRAM

## 2024-01-15 PROCEDURE — 84100 ASSAY OF PHOSPHORUS: CPT | Performed by: STUDENT IN AN ORGANIZED HEALTH CARE EDUCATION/TRAINING PROGRAM

## 2024-01-15 PROCEDURE — 20600001 HC STEP DOWN PRIVATE ROOM

## 2024-01-15 PROCEDURE — 80048 BASIC METABOLIC PNL TOTAL CA: CPT

## 2024-01-15 PROCEDURE — 25000003 PHARM REV CODE 250

## 2024-01-15 PROCEDURE — 63600175 PHARM REV CODE 636 W HCPCS

## 2024-01-15 RX ORDER — POLYETHYLENE GLYCOL 3350 17 G/17G
17 POWDER, FOR SOLUTION ORAL
Status: DISCONTINUED | OUTPATIENT
Start: 2024-01-15 | End: 2024-01-15

## 2024-01-15 RX ADMIN — POTASSIUM BICARBONATE 25 MEQ: 978 TABLET, EFFERVESCENT ORAL at 09:01

## 2024-01-15 RX ADMIN — PRAVASTATIN SODIUM 80 MG: 40 TABLET ORAL at 09:01

## 2024-01-15 RX ADMIN — ALUMINUM HYDROXIDE, MAGNESIUM HYDROXIDE, AND DIMETHICONE 10 ML: 400; 400; 40 SUSPENSION ORAL at 09:01

## 2024-01-15 RX ADMIN — HEPARIN SODIUM 5000 UNITS: 5000 INJECTION INTRAVENOUS; SUBCUTANEOUS at 10:01

## 2024-01-15 RX ADMIN — ALUMINUM HYDROXIDE, MAGNESIUM HYDROXIDE, AND DIMETHICONE 10 ML: 400; 400; 40 SUSPENSION ORAL at 05:01

## 2024-01-15 RX ADMIN — Medication 1 ENEMA: at 10:01

## 2024-01-15 RX ADMIN — POTASSIUM BICARBONATE 25 MEQ: 978 TABLET, EFFERVESCENT ORAL at 10:01

## 2024-01-15 RX ADMIN — SPIRONOLACTONE 25 MG: 25 TABLET ORAL at 09:01

## 2024-01-15 RX ADMIN — AMIODARONE HYDROCHLORIDE 200 MG: 200 TABLET ORAL at 09:01

## 2024-01-15 RX ADMIN — Medication 1 ENEMA: at 11:01

## 2024-01-15 RX ADMIN — EMPAGLIFLOZIN 10 MG: 10 TABLET, FILM COATED ORAL at 09:01

## 2024-01-15 RX ADMIN — ALUMINUM HYDROXIDE, MAGNESIUM HYDROXIDE, AND DIMETHICONE 10 ML: 400; 400; 40 SUSPENSION ORAL at 01:01

## 2024-01-15 RX ADMIN — FUROSEMIDE 20 MG/HR: 10 INJECTION, SOLUTION INTRAMUSCULAR; INTRAVENOUS at 10:01

## 2024-01-15 RX ADMIN — HEPARIN SODIUM 5000 UNITS: 5000 INJECTION INTRAVENOUS; SUBCUTANEOUS at 05:01

## 2024-01-15 RX ADMIN — ALUMINUM HYDROXIDE, MAGNESIUM HYDROXIDE, AND DIMETHICONE 10 ML: 400; 400; 40 SUSPENSION ORAL at 10:01

## 2024-01-15 RX ADMIN — HEPARIN SODIUM 5000 UNITS: 5000 INJECTION INTRAVENOUS; SUBCUTANEOUS at 01:01

## 2024-01-15 RX ADMIN — ASPIRIN 325 MG: 325 TABLET, COATED ORAL at 09:01

## 2024-01-15 NOTE — PT/OT/SLP PROGRESS
Occupational Therapy      Patient Name:  Papito Bhakta   MRN:  [id number]    Patient not seen today secondary to pt refused d/t not feeling well. Will follow-up tomorrow.    1/15/2024

## 2024-01-15 NOTE — ASSESSMENT & PLAN NOTE
Patient with Hypoxic Respiratory failure which is Acute.  he is not on home oxygen. Supplemental oxygen was provided and noted-      Patient with persistent O2 requirement of 2-3 LPM. Largest contribution likely due to fluid overload in setting of heart failure exacerbation and possible urinary retention. Also found to be COVID positive, s/p course of remdesivir. With his persistent hypoxia, possible that he is developing a post viral pneumonia, or an aspiration pneumonia in setting of dysphagia.   Troponin and repeat EKG WNL, concern for ACS low.   He does have baseline arrhythmia with PM in place, recently interrogated. No AS on recent TTE.    - Continue lasix gtt (see acute on chronic HF) and dobutamine gtt  - Tunneled line placed 1/12/24 for dobutamine infusion  - Adding GDMT as tolerated  - Wean O2 as tolerated  - BNP repeated and still elevated at 2503.  - Consulting cardiology for failure to improve despite diuresis  - PT/OT consulted and following

## 2024-01-15 NOTE — PLAN OF CARE
Recommendations  1. Continue Full liquid diet   2. Continue Boost Breeze TID for optimization of protein and calorie intake   3. RD following     Goals: Meet % of EEN/EPN by RD f/u date  Nutrition Goal Status: goal not met  Communication of RD Recs:POC

## 2024-01-15 NOTE — ASSESSMENT & PLAN NOTE
Continues to increase. Direct bilirubinemia. No signs of hemolysis. Likely 2/2 to congestive hepatopathy. Improving.    - Continuing to diurese  - Daily CMPs  - Appears chronic since November. Previously reported as secondary to congestive hepatopathy however remaining LFTs likely WNL.   - US Liver with doppler showing: Bidirectional portal vein flow may be seen with right heart failure, tricuspid regurgitation, or portal hypertension. Evidence of volume overload with distended IVC and hepatic veins.  Pulsatile flow through the hepatic veins (also possibly suggesting tricuspid regurgitation). Suspected hepatic steatosis. No evidence of biliary obstruction.  Possible genetic condition

## 2024-01-15 NOTE — ASSESSMENT & PLAN NOTE
Chronic, controlled. Latest blood pressure and vitals reviewed-     Temp:  [96.5 °F (35.8 °C)-97.8 °F (36.6 °C)]   Pulse:  [76-82]   Resp:  [18-22]   BP: ()/(53-71)   SpO2:  [94 %-100 %] .   Home meds for hypertension were reviewed and noted below.   Hypertension Medications               furosemide (LASIX) 80 MG tablet TAKE 1 TABLET EVERY DAY    metoprolol succinate (TOPROL-XL) 50 MG 24 hr tablet TAKE 1 TABLET EVERY DAY    spironolactone (ALDACTONE) 25 MG tablet TAKE 1/2 TABLET (12.5 MG TOTAL) ONCE DAILY. HOLD IF SYSTOLIC BLOOD PRESSURE IS LESS THAN 110            While in the hospital, will manage blood pressure as follows; Adjust home antihypertensive regimen as follows- Holding in setting of borderline pressures in setting of new diuresis     Will utilize p.r.n. blood pressure medication only if patient's blood pressure greater than 180/110 and he develops symptoms such as worsening chest pain or shortness of breath.

## 2024-01-15 NOTE — PROGRESS NOTES
[facility] - Cardiology [facility] Medicine  Progress Note    Patient Name: Papito Bhakta  MRN: [id number]  Patient Class: IP- Inpatient   Admission Date: 12/29/2023  Length of Stay: 13 days  Attending Physician: Cayetano Somers MD  Primary Care Provider: Brynn To MD        Subjective:     Principal Problem:Serum total bilirubin elevated        HPI:  Papito Bhakta is a 68 y.o. male with NICM, combined CHF(11/2023 EF 10 -15%, G3DD) s/p ICD placement, CKD, HLD, HTN, and AMELIA who presents for bilateral lower extremity swelling and shortness of breath. Patient reports he has been having worsening shortness of breath for the past 6 months. He had acutely worsening SOB today where he described becoming profoundly dyspneic on exertion. He reported taking 2 of his 80 mg Lasix this morning with subsequent minimal urine output and minimal improvement in his SOB. He reports SOB aggravated with lying down and he requires frequent positional changes to keep comfortable. He reports additional poor appetite and urine output for the past week although he reports he has been drinking well. He had 2 episodes of nausea/vomiting this past week as well. He denies fever/chills, chest pain, abdominal pain, recent illness, medication changes. Patient reports adherence with all medications. He reports he lives with his daughter who helps him with his medications and healthcare.    Additionally he reports chronic leg pain from a chronic RLE wound. He went to wound care yesterday where dressings were changed and he was told they were healing well. He has bilateral lower extremity edema R>L that is typical for him and he denies recent worsening.    Overview/Hospital Course:  Pt admitted to hospital medicine for acute heart failure exacerbation and inability to swallow solids for 3 months duration. Initiated on IV lasix. SLP evaluated the patient and determined that he could tolerate liquids. He had a new leukocytosis noted on  "12/31, work up significant for COVID positive. He completed remdesivir, steroids held in order to prevent worsening fluid retention, and was able to be weaned to room oxygen by 1/4. Overnight 12/31, patient becane tachycardic and hypotensive (asymptomatic). Cardiology called and concluded that he was in atrial fibrillation with RVR and he was briefly changed from oral amiodarone to IV amiodarone for one day before resuming his oral dosing. PM interrogated, noted to be functioning accurately. Cardiology signed off. IV lasix increased from pushes to gtt. Urine output did not significantly increase, bladder scan with 325ml urine. Mcmahon placed.     GI was consulted for his ongoing dysphagia. With his ongoing aggressive diuresis, COVID infection, and episode of hemodynamic stability, the EGD was deferred until he becomes more euvolemic.     Transient episode of increased respiratory distress 1/6. EKG without significant changes from prior. Troponin negative. CXR showed stability from prior imaging.    During his admission, noted to have chronic bilirubinemia, assumed likely secondary to congestive hepatopathy. RUQ US without biliary obstruction. Hemolysis labs negative. Tbili continued to uptrend despite improvement in fluid status and renal function. Also noted to have worsening thrombocytopenia, despite normal LFTs. Hepatology consulted, who agree with congestive hepatopathy. Further work up pending. He has chronic lower extremity leg wounds secondary to peripheral vascular disease. Wound care was consulted and assisted in managing the wounds during his admission. He has outpatient follow-up scheduled with vascular surgery at the end of February. Cardiology recommending aggressive diuresis, started him on a dobutamine gtt and continued lasix gtt.    1/10 expressing desire to stop further treatment and to discharge to home. States he wishes to live comfortably at home and "if it's my time it's my time". Palliative care " consulted. 1/11 expressing desire to continue treatment and change back to full code.    Tunneled line placed for dobutamine infusion. Continuing diuresis with lasix gtt. Planning for EGD this week.    Interval History: NAEO. He had a BM this morning and attempted to use a butter knife to further disimpact himself. No supplemental O2 this morning.     Objective:     Vital Signs (Most Recent):  Temp: 97.8 °F (36.6 °C) (01/15/24 1114)  Pulse: 80 (01/15/24 1114)  Resp: (!) 22 (01/15/24 1114)  BP: 90/60 (01/15/24 1114)  SpO2: 100 % (01/15/24 1114) Vital Signs (24h Range):  Temp:  [96.5 °F (35.8 °C)-97.8 °F (36.6 °C)] 97.8 °F (36.6 °C)  Pulse:  [76-82] 80  Resp:  [18-22] 22  SpO2:  [94 %-100 %] 100 %  BP: ()/(53-71) 90/60     Weight: 98.6 kg (217 lb 6 oz)  Body mass index is 25.78 kg/m².    Intake/Output Summary (Last 24 hours) at 1/15/2024 1307  Last data filed at 1/15/2024 1127  Gross per 24 hour   Intake 860 ml   Output 4300 ml   Net -3440 ml         Physical Exam  Vitals and nursing note reviewed.   Constitutional:       Appearance: He is ill-appearing.   HENT:      Head: Normocephalic and atraumatic.      Mouth/Throat:      Mouth: Mucous membranes are dry.   Eyes:      General: Scleral icterus present.      Extraocular Movements: Extraocular movements intact.      Comments: Anisocoria   Neck:      Vascular: JVD (5cm) present.   Cardiovascular:      Rate and Rhythm: Normal rate and regular rhythm.      Heart sounds:      Gallop present.   Pulmonary:      Effort: Pulmonary effort is normal. No respiratory distress.      Breath sounds: Normal breath sounds. No wheezing.   Abdominal:      General: Abdomen is flat. There is no distension.      Palpations: Abdomen is soft.      Tenderness: There is no abdominal tenderness.   Musculoskeletal:         General: Tenderness (b/l LE) present.      Right lower leg: Edema present.      Left lower leg: Edema present.   Skin:     General: Skin is warm and dry.   Neurological:  "     General: No focal deficit present.      Mental Status: He is alert and oriented to person, place, and time.      Motor: Weakness present.   Psychiatric:         Mood and Affect: Mood normal.         Behavior: Behavior normal.             Significant Labs: All pertinent labs within the past 24 hours have been reviewed.    Significant Imaging: I have reviewed all pertinent imaging results/findings within the past 24 hours.    Assessment/Plan:      * Serum total bilirubin elevated  Continues to increase. Direct bilirubinemia. No signs of hemolysis. Likely 2/2 to congestive hepatopathy. Improving.    - Continuing to diurese  - Daily CMPs  - Appears chronic since November. Previously reported as secondary to congestive hepatopathy however remaining LFTs likely WNL.   - US Liver with doppler showing: Bidirectional portal vein flow may be seen with right heart failure, tricuspid regurgitation, or portal hypertension. Evidence of volume overload with distended IVC and hepatic veins.  Pulsatile flow through the hepatic veins (also possibly suggesting tricuspid regurgitation). Suspected hepatic steatosis. No evidence of biliary obstruction.  Possible genetic condition      ACP (advance care planning)  1/10, patient stating desire to go home. He is tired of his prolonged hospital course and feeling defeated at the slow trajectory of his remaining course. States he wants to be comfortable at home, "if it's my time it's my time". We discussed benefits of staying, include improved quality of life if we are able to medically optimize  Palliative care consulted, greatly appreciate assistance. With this conversation, along with conversations with family, he has agreed to stay for now. Will continue to engage in GOC conversations.     DNR order placed per palliative  Need to assess utility of ICD    1/11, patient now stating that he would like all interventions done if needed and does not want ICD turned off  Order placed to " make patient Full Code       CKD (chronic kidney disease)  Creatine stable for now. BMP reviewed- noted Estimated Creatinine Clearance: 68.5 mL/min (based on SCr of 1.3 mg/dL). according to latest data. Based on current GFR, CKD stage is stage 3 - GFR 30-59.  Monitor UOP and serial BMP and adjust therapy as needed. Renally dose meds. Avoid nephrotoxic medications and procedures.    COVID-19  COVID positive, noted 12/31    - Finished remdesivir course 01/02/24    Microcytic anemia  Iron studies notable for Fe 26 and sat iron 8%. TIBC, ferritin, transferrin wnl. Likely iron deficiency anemia.    - Daily CBCs  - Feraheme given 1/4/24    Dysphagia  Reports a 3 month history of inability to tolerate solid foods. Notable vomiting immediatly with swallowing food/liquid. No complaints of nausea. SLP has assessed him, can tolerate liquids.     - Planning for EGD this week  - Continue liquid diet, Boost TID  - GI cancelled EGD and signing off. Will re-consult once patient is more euvolemic and hemodynamically stable.       Pupil asymmetry  Notable asymetry on pupils by patient. Chart review shows left orbital trauma in 2021 with notes concerned for dilation and vision changes. When talking to daughter, this is chronic. States no vision changes or neuro symptoms whatsoever.     Acute on chronic combined systolic and diastolic CHF (congestive heart failure)  Patient is identified as having Combined Systolic and Diastolic heart failure that is Acute on chronic. CHF is currently uncontrolled due to Pulmonary edema/pleural effusion on CXR. Latest ECHO performed and demonstrates- Results for orders placed during the hospital encounter of 11/10/23    Echo    Interpretation Summary    Left Ventricle: The left ventricle is severely dilated. Mildly increased wall thickness. There is mild concentric hypertrophy. Severe global hypokinesis present. There is severely reduced systolic function with a visually estimated ejection fraction of  "10 -15%. Grade III diastolic dysfunction.    Right Ventricle: Severe right ventricular enlargement. Systolic function is severely reduced.    Mitral Valve: There is no stenosis. There is mild to moderate regurgitation with a centrally directed jet.    Tricuspid Valve: There is mild to moderate regurgitation.    Pulmonary Artery: The estimated pulmonary artery systolic pressure is 67 mmHg.  .Last BNP reviewed- and noted below   No results for input(s): "BNP", "BNPTRIAGEBLO" in the last 168 hours.      Presented for acute on chronic SOB with associated low UOP. Afebrile HDS. BNP 3,067, Troponin 0.031. CXR with cardiomegaly, vascular congestion, and edema. Received 100 of Lasix in the ED.    - Lasix and dobutamine gtt  - RIP positive for Covid, treatment completed.  - continue home Jardiance   - Spironolactone at 25mg qd  - Restart BB as tolerated  - Documented allergy to ACE/ARBs  - Cardiac diet with Fluid restriction at 1.5L with strict I/Os and daily STANDING weights  - Check Electrolytes, keep Mag >2 & K+ >4  - SCDs, Ambulate as tolerated    - Strict I&Os, Daily standing weights  - Review Low-salt diet and enforce medication adherence on discharge    Acute renal failure superimposed on stage 3a chronic kidney disease  Creatinine 2.1 on admit, baseline around 1.4. Likely prerenal etiology given urine sodium and FENa vs progression of CKD. Improving with increased diuresis     Recent Labs     01/14/24  0442 01/14/24  1711 01/15/24  0447   BUN 37* 44* 41*   CREATININE 1.3 1.3 1.3         Estimated Creatinine Clearance: 68.5 mL/min (based on SCr of 1.3 mg/dL).  RESOLVED    - Renal US: renal cysts, no hydronephrosis  - Urine Na: 14, Pr/Cr: 0.72; FENa 0.2%  - Monitor urine output and serial BMP and adjust therapy as needed.   - Strict I&Os and daily weights   - Avoid nephrotoxic agents such as NSAIDs, gadolinium and IV radiocontrast.  - Renally dose meds to current GFR.  - Maintain MAP > 65.  - Nephrology referral " outpatient    AMELIA (obstructive sleep apnea)  Carried diagnosis of AMELIA per chart, however he does not sleep with CPAP at home and declines while here      Peripheral vascular disease, unspecified  Patient with chronic lower leg wounds that recently established with wound care. He is set to follow up with vascular surgery outpatient at the end of February.    Appreciate wound care recommendations  Continue diuresis       NICM (nonischemic cardiomyopathy)  ASA 325mg qd per home medication list. Reason for this dose unclear.      Biventricular ICD (implantable cardioverter-defibrillator) in place  Stable on admission, no acute issues, he denies discharge. QTc chronically prolonged. Continue home amiodarone for NSVT prevention. Monitor on telemetry     - 12/31, noted to have AF RVR with hypotension. Started on amio gtt. Per report, pacemaker malfunctioning.  - Device interrogated, no complications. EP has signed off and pt is back on home amio    Hyperlipidemia  Stable. Continue Home Pravastatin.        Acute hypoxemic respiratory failure  Patient with Hypoxic Respiratory failure which is Acute.  he is not on home oxygen. Supplemental oxygen was provided and noted-      Patient with persistent O2 requirement of 2-3 LPM. Largest contribution likely due to fluid overload in setting of heart failure exacerbation and possible urinary retention. Also found to be COVID positive, s/p course of remdesivir. With his persistent hypoxia, possible that he is developing a post viral pneumonia, or an aspiration pneumonia in setting of dysphagia.   Troponin and repeat EKG WNL, concern for ACS low.   He does have baseline arrhythmia with PM in place, recently interrogated. No AS on recent TTE.    - Continue lasix gtt (see acute on chronic HF) and dobutamine gtt  - Tunneled line placed 1/12/24 for dobutamine infusion  - Adding GDMT as tolerated  - Wean O2 as tolerated  - BNP repeated and still elevated at 2503.  - Consulting cardiology  for failure to improve despite diuresis  - PT/OT consulted and following    Essential hypertension  Chronic, controlled. Latest blood pressure and vitals reviewed-     Temp:  [96.5 °F (35.8 °C)-97.8 °F (36.6 °C)]   Pulse:  [76-82]   Resp:  [18-22]   BP: ()/(53-71)   SpO2:  [94 %-100 %] .   Home meds for hypertension were reviewed and noted below.   Hypertension Medications               furosemide (LASIX) 80 MG tablet TAKE 1 TABLET EVERY DAY    metoprolol succinate (TOPROL-XL) 50 MG 24 hr tablet TAKE 1 TABLET EVERY DAY    spironolactone (ALDACTONE) 25 MG tablet TAKE 1/2 TABLET (12.5 MG TOTAL) ONCE DAILY. HOLD IF SYSTOLIC BLOOD PRESSURE IS LESS THAN 110            While in the hospital, will manage blood pressure as follows; Adjust home antihypertensive regimen as follows- Holding in setting of borderline pressures in setting of new diuresis     Will utilize p.r.n. blood pressure medication only if patient's blood pressure greater than 180/110 and he develops symptoms such as worsening chest pain or shortness of breath.      VTE Risk Mitigation (From admission, onward)           Ordered     heparin (porcine) injection 5,000 Units  Every 8 hours         12/29/23 1759     IP VTE HIGH RISK PATIENT  Once         12/29/23 1759     Place sequential compression device  Until discontinued         12/29/23 1759                    Discharge Planning   MARIIA: 1/17/2024     Code Status: Full Code   Is the patient medically ready for discharge?: No    Reason for patient still in hospital (select all that apply): Treatment and Consult recommendations  Discharge Plan A: Home with family, Home Health                  Deepa Holly MD  Department of Hospital Medicine   [facility] - Cardiology Stepdown

## 2024-01-15 NOTE — PLAN OF CARE
Problem: Adult Inpatient Plan of Care  Goal: Plan of Care Review  Outcome: Ongoing, Progressing  Flowsheets (Taken 1/15/2024 1554)  Plan of Care Reviewed With: patient  Goal: Patient-Specific Goal (Individualized)  Outcome: Ongoing, Progressing  Goal: Absence of Hospital-Acquired Illness or Injury  Outcome: Ongoing, Progressing  Intervention: Identify and Manage Fall Risk  Flowsheets (Taken 1/15/2024 1554)  Safety Promotion/Fall Prevention: instructed to call staff for mobility  Intervention: Prevent Skin Injury  Flowsheets (Taken 1/15/2024 1554)  Body Position: position changed independently  Intervention: Prevent and Manage VTE (Venous Thromboembolism) Risk  Flowsheets (Taken 1/15/2024 1554)  Range of Motion: active ROM (range of motion) encouraged  Goal: Optimal Comfort and Wellbeing  Outcome: Ongoing, Progressing  Goal: Readiness for Transition of Care  Outcome: Ongoing, Progressing     Problem: Adjustment to Illness (Heart Failure)  Goal: Optimal Coping  Outcome: Ongoing, Progressing  Intervention: Support Psychosocial Response  Flowsheets (Taken 1/15/2024 1554)  Supportive Measures:   active listening utilized   counseling provided     Problem: Oral Intake Inadequate (Heart Failure)  Goal: Optimal Nutrition Intake  Outcome: Ongoing, Progressing     Problem: Respiratory Compromise (Heart Failure)  Goal: Effective Oxygenation and Ventilation  Outcome: Ongoing, Progressing     Problem: Sleep Disordered Breathing (Heart Failure)  Goal: Effective Breathing Pattern During Sleep  Outcome: Ongoing, Progressing

## 2024-01-15 NOTE — PLAN OF CARE
Problem: Renal Function Impairment (Acute Kidney Injury/Impairment)  Goal: Effective Renal Function  Outcome: Ongoing, Progressing     Problem: Impaired Wound Healing  Goal: Optimal Wound Healing  Outcome: Ongoing, Progressing     Problem: Adjustment to Illness (Heart Failure)  Goal: Optimal Coping  Outcome: Ongoing, Progressing     Problem: Cardiac Output Decreased (Heart Failure)  Goal: Optimal Cardiac Output  Outcome: Ongoing, Progressing     Problem: Fluid Imbalance (Heart Failure)  Goal: Fluid Balance  Outcome: Ongoing, Progressing     Problem: Infection  Goal: Absence of Infection Signs and Symptoms  Outcome: Ongoing, Progressing

## 2024-01-15 NOTE — ASSESSMENT & PLAN NOTE
Reports a 3 month history of inability to tolerate solid foods. Notable vomiting immediatly with swallowing food/liquid. No complaints of nausea. SLP has assessed him, can tolerate liquids.     - Planning for EGD this week  - Continue liquid diet, Boost TID  - GI cancelled EGD and signing off. Will re-consult once patient is more euvolemic and hemodynamically stable.

## 2024-01-15 NOTE — ASSESSMENT & PLAN NOTE
"Patient is identified as having Combined Systolic and Diastolic heart failure that is Acute on chronic. CHF is currently uncontrolled due to Pulmonary edema/pleural effusion on CXR. Latest ECHO performed and demonstrates- Results for orders placed during the hospital encounter of 11/10/23    Echo    Interpretation Summary    Left Ventricle: The left ventricle is severely dilated. Mildly increased wall thickness. There is mild concentric hypertrophy. Severe global hypokinesis present. There is severely reduced systolic function with a visually estimated ejection fraction of 10 -15%. Grade III diastolic dysfunction.    Right Ventricle: Severe right ventricular enlargement. Systolic function is severely reduced.    Mitral Valve: There is no stenosis. There is mild to moderate regurgitation with a centrally directed jet.    Tricuspid Valve: There is mild to moderate regurgitation.    Pulmonary Artery: The estimated pulmonary artery systolic pressure is 67 mmHg.  .Last BNP reviewed- and noted below   No results for input(s): "BNP", "BNPTRIAGEBLO" in the last 168 hours.      Presented for acute on chronic SOB with associated low UOP. Afebrile HDS. BNP 3,067, Troponin 0.031. CXR with cardiomegaly, vascular congestion, and edema. Received 100 of Lasix in the ED.    - Lasix and dobutamine gtt  - RIP positive for Covid, treatment completed.  - continue home Jardiance   - Spironolactone at 25mg qd  - Restart BB as tolerated  - Documented allergy to ACE/ARBs  - Cardiac diet with Fluid restriction at 1.5L with strict I/Os and daily STANDING weights  - Check Electrolytes, keep Mag >2 & K+ >4  - SCDs, Ambulate as tolerated    - Strict I&Os, Daily standing weights  - Review Low-salt diet and enforce medication adherence on discharge  "

## 2024-01-15 NOTE — PROGRESS NOTES
"[facility] - Cardiology Stepdown  Adult Nutrition  Progress Note    SUMMARY       Recommendations  1. Continue Full liquid diet   2. Continue Boost Breeze TID for optimization of protein and calorie intake   3. RD following    Goals: Meet % of EEN/EPN by RD f/u date  Nutrition Goal Status: goal not met  Communication of RD Recs:POC    Assessment and Plan    Nutrition Problem  Increased protein needs     Related to (etiology):   Physiological needs     Signs and Symptoms (as evidenced by):   Bilirubin elevated    Interventions (treatment strategy):  Collaboration of nutrition care w/ other providers     Nutrition Diagnosis Status:   New        Reason for Assessment    Reason For Assessment: other (see comments)  Diagnosis: other (see comments)  Relevant Medical History: HTN, obesity  Interdisciplinary Rounds: did not attend  General Information Comments: LOS. Pt consuming 50-75% meal consumption- consuming PO supplements. Pt does not meet criteria for malnutrition at this time.  Nutrition Discharge Planning: pending clinical course    Nutrition Risk Screen    Nutrition Risk Screen: no indicators present    Nutrition/Diet History    Spiritual, Cultural Beliefs, Latter-day Practices, Values that Affect Care: no  Food Allergies: other (see comments)    Anthropometrics    Temp: 98.6 °F (37 °C)  Height: 6' 5" (195.6 cm)  Height (inches): 77 in  Weight Method: Bed Scale  Weight: 98.6 kg (217 lb 6 oz)  Weight (lb): 217.38 lb  Ideal Body Weight (IBW), Male: 208 lb  % Ideal Body Weight, Male (lb): 119.66 %  BMI (Calculated): 25.8  BMI Grade: 25 - 29.9 - overweight       Lab/Procedures/Meds    Pertinent Labs Reviewed: reviewed  Pertinent Labs Comments: Sodium 135, Glucose 69, AST 42  Pertinent Medications Reviewed: reviewed  Pertinent Medications Comments: furosemide    Estimated/Assessed Needs    Weight Used For Calorie Calculations: 98.6 kg (217 lb 6 oz)  Energy Calorie Requirements (kcal): 1873  Energy Need Method: " Angi Ryan (PAL 1.0)  Protein Requirements: 98 g (1.0 g/kg)  Weight Used For Protein Calculations: 98.6 kg (217 lb 6 oz)        RDA Method (mL): 1873         Nutrition Prescription Ordered    Current Diet Order: Full liquid  Nutrition Order Comments: 1500 ml FR    Evaluation of Received Nutrient/Fluid Intake       % Intake of Estimated Energy Needs: 50 - 75 %  % Meal Intake: 50 - 75 %    Nutrition Risk    Level of Risk/Frequency of Follow-up: low (1x/week)    Monitor and Evaluation    Food and Nutrient Intake: energy intake, food and beverage intake  Food and Nutrient Adminstration: diet order  Knowledge/Beliefs/Attitudes: food and nutrition knowledge/skill, beliefs and attitudes  Physical Activity and Function: nutrition-related ADLs and IADLs, factors affecting access to physical activity  Anthropometric Measurements: height/length, weight, weight change, body mass index, growth pattern indices/percentile ranks  Biochemical Data, Medical Tests and Procedures: gastrointestinal profile, electrolyte and renal panel, glucose/endocrine profile, inflammatory profile, lipid profile  Nutrition-Focused Physical Findings: overall appearance, extremities, muscles and bones, head and eyes, skin     Nutrition Follow-Up    RD Follow-up?: Yes

## 2024-01-15 NOTE — TREATMENT PLAN
"Hepatology Treatment Plan    Papito Bhakta is a 68 y.o. male admitted to hospital 12/29/2023 (Hospital Day: 18) due to Serum total bilirubin elevated.     Interval History  Tbili downtrending 10>8  AST, ALT declining    Objective  Temp:  [96.5 °F (35.8 °C)-97.8 °F (36.6 °C)] 97.8 °F (36.6 °C) (01/15 1114)  Pulse:  [76-82] 80 (01/15 1114)  BP: ()/(53-71) 90/60 (01/15 1114)  Resp:  [18-22] 22 (01/15 1114)  SpO2:  [94 %-100 %] 100 % (01/15 1114)    Laboratory    Recent Labs   Lab 01/15/24  0447   WBC 7.05   RBC 5.29   HGB 12.6*   HCT 39.3*   PLT 76*   MCV 74*   MCH 23.8*   MCHC 32.1      Recent Labs   Lab 01/15/24  0447   CALCIUM 9.2   ALBUMIN 2.2*   PROT 6.9   *   K 4.8   CO2 23   CL 97   BUN 41*   CREATININE 1.3   ALKPHOS 112   ALT 28   AST 42*   BILITOT 7.6*      No results for input(s): "PT", "INR", "APTT" in the last 24 hours.     MELD 3.0: 22 at 12/6/2023  9:32 AM  MELD-Na: 21 at 12/6/2023  9:32 AM  Calculated from:  Serum Creatinine: 2.0 mg/dL at 12/6/2023  9:32 AM  Serum Sodium: 136 mmol/L at 12/6/2023  9:32 AM  Total Bilirubin: 4.0 mg/dL at 12/6/2023  9:32 AM  Serum Albumin: 3.0 g/dL at 12/6/2023  9:32 AM  INR(ratio): 1.2 at 12/6/2023  9:32 AM  Age at listing (hypothetical): 68 years  Sex: Male at 12/6/2023  9:32 AM     ASMA 1:80  NADINE negative  IgG mildly elevated  A1AT negative  Iron studies negative  Ceruloplasmin negative    Assessment and Plan    68 year old M with significant cardiac hx, CHF with EF 10-15% with ICD. Hepatology consulted for elevated T bili. Direct hyperbilirubinemia with no signs of hemolysis. Hyperbilirubinemia noted in November 2023 also when patient was admitted for CHF exacerbation. Liver US done showing: Bidirectional portal vein flow may be seen with right heart failure, tricuspid regurgitation, or portal hypertension. Evidence of volume overload with distended IVC and hepatic veins.  Pulsatile flow through the hepatic veins (also possibly suggesting tricuspid " regurgitation). Suspected hepatic steatosis. No evidence of biliary obstruction. Recent hepatitis panel negative. Serologic workup shows mildly positive ASMA, but still suspect elevated Tbili likely 2/2 congestive hepatopathy, now slowly improving with diuresis and ionotropes.    Problem List:  Congestive hepatopathy, improving    Plan:  - Continue to monitor LFTs  - CHF management per primary team  - No plans for liver biopsy      - We we will sign off.    Thank you for involving us in the care of Papito Bhakta. Please call with any additional questions, concerns or changes in the patient's clinical status. Plan of care discussed with attending Dr. Lay.    Silas Loomis MD  Gastroenterology and Hepatology Fellow, PGY V

## 2024-01-15 NOTE — ASSESSMENT & PLAN NOTE
Creatinine 2.1 on admit, baseline around 1.4. Likely prerenal etiology given urine sodium and FENa vs progression of CKD. Improving with increased diuresis     Recent Labs     01/14/24  0442 01/14/24  1711 01/15/24  0447   BUN 37* 44* 41*   CREATININE 1.3 1.3 1.3         Estimated Creatinine Clearance: 68.5 mL/min (based on SCr of 1.3 mg/dL).  RESOLVED    - Renal US: renal cysts, no hydronephrosis  - Urine Na: 14, Pr/Cr: 0.72; FENa 0.2%  - Monitor urine output and serial BMP and adjust therapy as needed.   - Strict I&Os and daily weights   - Avoid nephrotoxic agents such as NSAIDs, gadolinium and IV radiocontrast.  - Renally dose meds to current GFR.  - Maintain MAP > 65.  - Nephrology referral outpatient

## 2024-01-15 NOTE — SUBJECTIVE & OBJECTIVE
Interval History: NAEO. He had a BM this morning and attempted to use a butter knife to further disimpact himself. No supplemental O2 this morning.     Objective:     Vital Signs (Most Recent):  Temp: 97.8 °F (36.6 °C) (01/15/24 1114)  Pulse: 80 (01/15/24 1114)  Resp: (!) 22 (01/15/24 1114)  BP: 90/60 (01/15/24 1114)  SpO2: 100 % (01/15/24 1114) Vital Signs (24h Range):  Temp:  [96.5 °F (35.8 °C)-97.8 °F (36.6 °C)] 97.8 °F (36.6 °C)  Pulse:  [76-82] 80  Resp:  [18-22] 22  SpO2:  [94 %-100 %] 100 %  BP: ()/(53-71) 90/60     Weight: 98.6 kg (217 lb 6 oz)  Body mass index is 25.78 kg/m².    Intake/Output Summary (Last 24 hours) at 1/15/2024 1307  Last data filed at 1/15/2024 1127  Gross per 24 hour   Intake 860 ml   Output 4300 ml   Net -3440 ml         Physical Exam  Vitals and nursing note reviewed.   Constitutional:       Appearance: He is ill-appearing.   HENT:      Head: Normocephalic and atraumatic.      Mouth/Throat:      Mouth: Mucous membranes are dry.   Eyes:      General: Scleral icterus present.      Extraocular Movements: Extraocular movements intact.      Comments: Anisocoria   Neck:      Vascular: JVD (5cm) present.   Cardiovascular:      Rate and Rhythm: Normal rate and regular rhythm.      Heart sounds:      Gallop present.   Pulmonary:      Effort: Pulmonary effort is normal. No respiratory distress.      Breath sounds: Normal breath sounds. No wheezing.   Abdominal:      General: Abdomen is flat. There is no distension.      Palpations: Abdomen is soft.      Tenderness: There is no abdominal tenderness.   Musculoskeletal:         General: Tenderness (b/l LE) present.      Right lower leg: Edema present.      Left lower leg: Edema present.   Skin:     General: Skin is warm and dry.   Neurological:      General: No focal deficit present.      Mental Status: He is alert and oriented to person, place, and time.      Motor: Weakness present.   Psychiatric:         Mood and Affect: Mood normal.          Behavior: Behavior normal.             Significant Labs: All pertinent labs within the past 24 hours have been reviewed.    Significant Imaging: I have reviewed all pertinent imaging results/findings within the past 24 hours.

## 2024-01-16 LAB
ALBUMIN SERPL BCP-MCNC: 2.2 G/DL (ref 3.5–5.2)
ALP SERPL-CCNC: 106 U/L (ref 55–135)
ALT SERPL W/O P-5'-P-CCNC: 28 U/L (ref 10–44)
ANION GAP SERPL CALC-SCNC: 10 MMOL/L (ref 8–16)
ANION GAP SERPL CALC-SCNC: 11 MMOL/L (ref 8–16)
AST SERPL-CCNC: 35 U/L (ref 10–40)
BILIRUB SERPL-MCNC: 7.2 MG/DL (ref 0.1–1)
BUN SERPL-MCNC: 41 MG/DL (ref 8–23)
BUN SERPL-MCNC: 41 MG/DL (ref 8–23)
CALCIUM SERPL-MCNC: 9.2 MG/DL (ref 8.7–10.5)
CALCIUM SERPL-MCNC: 9.2 MG/DL (ref 8.7–10.5)
CHLORIDE SERPL-SCNC: 95 MMOL/L (ref 95–110)
CHLORIDE SERPL-SCNC: 97 MMOL/L (ref 95–110)
CO2 SERPL-SCNC: 30 MMOL/L (ref 23–29)
CO2 SERPL-SCNC: 32 MMOL/L (ref 23–29)
CREAT SERPL-MCNC: 1.3 MG/DL (ref 0.5–1.4)
CREAT SERPL-MCNC: 1.3 MG/DL (ref 0.5–1.4)
ERYTHROCYTE [DISTWIDTH] IN BLOOD BY AUTOMATED COUNT: 25 % (ref 11.5–14.5)
EST. GFR  (NO RACE VARIABLE): 59.8 ML/MIN/1.73 M^2
EST. GFR  (NO RACE VARIABLE): 59.8 ML/MIN/1.73 M^2
GLUCOSE SERPL-MCNC: 74 MG/DL (ref 70–110)
GLUCOSE SERPL-MCNC: 99 MG/DL (ref 70–110)
HCT VFR BLD AUTO: 36.1 % (ref 40–54)
HGB BLD-MCNC: 11.7 G/DL (ref 14–18)
MAGNESIUM SERPL-MCNC: 2.3 MG/DL (ref 1.6–2.6)
MCH RBC QN AUTO: 24 PG (ref 27–31)
MCHC RBC AUTO-ENTMCNC: 32.4 G/DL (ref 32–36)
MCV RBC AUTO: 74 FL (ref 82–98)
PHOSPHATE SERPL-MCNC: 3.3 MG/DL (ref 2.7–4.5)
PLATELET # BLD AUTO: 78 K/UL (ref 150–450)
PMV BLD AUTO: ABNORMAL FL (ref 9.2–12.9)
POTASSIUM SERPL-SCNC: 4.2 MMOL/L (ref 3.5–5.1)
POTASSIUM SERPL-SCNC: 4.3 MMOL/L (ref 3.5–5.1)
PROT SERPL-MCNC: 6.6 G/DL (ref 6–8.4)
RBC # BLD AUTO: 4.88 M/UL (ref 4.6–6.2)
SODIUM SERPL-SCNC: 137 MMOL/L (ref 136–145)
SODIUM SERPL-SCNC: 138 MMOL/L (ref 136–145)
WBC # BLD AUTO: 6.84 K/UL (ref 3.9–12.7)

## 2024-01-16 PROCEDURE — 25000003 PHARM REV CODE 250: Performed by: STUDENT IN AN ORGANIZED HEALTH CARE EDUCATION/TRAINING PROGRAM

## 2024-01-16 PROCEDURE — 36415 COLL VENOUS BLD VENIPUNCTURE: CPT | Mod: XB

## 2024-01-16 PROCEDURE — 25000003 PHARM REV CODE 250

## 2024-01-16 PROCEDURE — 25000242 PHARM REV CODE 250 ALT 637 W/ HCPCS

## 2024-01-16 PROCEDURE — 27000207 HC ISOLATION

## 2024-01-16 PROCEDURE — 85027 COMPLETE CBC AUTOMATED: CPT | Performed by: STUDENT IN AN ORGANIZED HEALTH CARE EDUCATION/TRAINING PROGRAM

## 2024-01-16 PROCEDURE — 84100 ASSAY OF PHOSPHORUS: CPT | Performed by: STUDENT IN AN ORGANIZED HEALTH CARE EDUCATION/TRAINING PROGRAM

## 2024-01-16 PROCEDURE — 36415 COLL VENOUS BLD VENIPUNCTURE: CPT | Performed by: STUDENT IN AN ORGANIZED HEALTH CARE EDUCATION/TRAINING PROGRAM

## 2024-01-16 PROCEDURE — 80048 BASIC METABOLIC PNL TOTAL CA: CPT | Mod: XB

## 2024-01-16 PROCEDURE — 99233 SBSQ HOSP IP/OBS HIGH 50: CPT | Mod: ,,, | Performed by: STUDENT IN AN ORGANIZED HEALTH CARE EDUCATION/TRAINING PROGRAM

## 2024-01-16 PROCEDURE — 94761 N-INVAS EAR/PLS OXIMETRY MLT: CPT

## 2024-01-16 PROCEDURE — 63600175 PHARM REV CODE 636 W HCPCS

## 2024-01-16 PROCEDURE — 80053 COMPREHEN METABOLIC PANEL: CPT | Performed by: STUDENT IN AN ORGANIZED HEALTH CARE EDUCATION/TRAINING PROGRAM

## 2024-01-16 PROCEDURE — 63600175 PHARM REV CODE 636 W HCPCS: Performed by: STUDENT IN AN ORGANIZED HEALTH CARE EDUCATION/TRAINING PROGRAM

## 2024-01-16 PROCEDURE — 83735 ASSAY OF MAGNESIUM: CPT | Performed by: STUDENT IN AN ORGANIZED HEALTH CARE EDUCATION/TRAINING PROGRAM

## 2024-01-16 PROCEDURE — 20600001 HC STEP DOWN PRIVATE ROOM

## 2024-01-16 RX ORDER — BUMETANIDE 1 MG/1
2 TABLET ORAL 2 TIMES DAILY
Status: DISCONTINUED | OUTPATIENT
Start: 2024-01-16 | End: 2024-01-18 | Stop reason: HOSPADM

## 2024-01-16 RX ORDER — SPIRONOLACTONE 25 MG/1
50 TABLET ORAL DAILY
Status: DISCONTINUED | OUTPATIENT
Start: 2024-01-17 | End: 2024-01-18 | Stop reason: HOSPADM

## 2024-01-16 RX ADMIN — HEPARIN SODIUM 5000 UNITS: 5000 INJECTION INTRAVENOUS; SUBCUTANEOUS at 10:01

## 2024-01-16 RX ADMIN — EMPAGLIFLOZIN 10 MG: 10 TABLET, FILM COATED ORAL at 09:01

## 2024-01-16 RX ADMIN — ALUMINUM HYDROXIDE, MAGNESIUM HYDROXIDE, AND DIMETHICONE 10 ML: 400; 400; 40 SUSPENSION ORAL at 05:01

## 2024-01-16 RX ADMIN — SPIRONOLACTONE 25 MG: 25 TABLET ORAL at 09:01

## 2024-01-16 RX ADMIN — BUMETANIDE 2 MG: 1 TABLET ORAL at 02:01

## 2024-01-16 RX ADMIN — HEPARIN SODIUM 5000 UNITS: 5000 INJECTION INTRAVENOUS; SUBCUTANEOUS at 06:01

## 2024-01-16 RX ADMIN — PRAVASTATIN SODIUM 80 MG: 40 TABLET ORAL at 09:01

## 2024-01-16 RX ADMIN — ALUMINUM HYDROXIDE, MAGNESIUM HYDROXIDE, AND DIMETHICONE 10 ML: 400; 400; 40 SUSPENSION ORAL at 09:01

## 2024-01-16 RX ADMIN — DOBUTAMINE HYDROCHLORIDE 2.5 MCG/KG/MIN: 400 INJECTION INTRAVENOUS at 05:01

## 2024-01-16 RX ADMIN — AMIODARONE HYDROCHLORIDE 200 MG: 200 TABLET ORAL at 09:01

## 2024-01-16 RX ADMIN — POTASSIUM BICARBONATE 25 MEQ: 978 TABLET, EFFERVESCENT ORAL at 09:01

## 2024-01-16 RX ADMIN — ALUMINUM HYDROXIDE, MAGNESIUM HYDROXIDE, AND DIMETHICONE 10 ML: 400; 400; 40 SUSPENSION ORAL at 01:01

## 2024-01-16 RX ADMIN — HEPARIN SODIUM 5000 UNITS: 5000 INJECTION INTRAVENOUS; SUBCUTANEOUS at 02:01

## 2024-01-16 RX ADMIN — ASPIRIN 325 MG: 325 TABLET, COATED ORAL at 09:01

## 2024-01-16 NOTE — PROGRESS NOTES
[facility] - Cardiology Stepdown  Palliative Medicine  Progress Note    Patient Name: Papito Bhakta  MRN: [id number]  Admission Date: 12/29/2023  Hospital Length of Stay: 14 days  Code Status: Full Code   Attending Provider: Cayetano Somers MD  Consulting Provider: Massiel Wiggins MD  Primary Care Physician: Brynn To MD  Principal Problem:Serum total bilirubin elevated    Patient information was obtained from patient and primary team.      Assessment/Plan:     Palliative Care  Encounter for palliative care  Papito Bhakta is a 68-year-old man with a history of end stage NICM (EF 10-15%) s/p ICD, NYHA class IV who was admitted for severe heart failure exacerbation, solid-food dysphagia and found to be incidentally COVID positive. During this admission, he was started on long-term dobutamine. Palliative and Supportive Care was consulted to explore goals of care.    Advance Care Planning  Goals of Care  - Code status: reversed to full code on 1/11/24  - Next of kin: two adult children, Annie (daughter) and Papito Draper (son)   - Lives with daughter Annie  - ACP documents: has [place], but will need updating to reflect DNR code status  - Patient has decision making capacity  - Prognosis: poor, NYHA class IV for end-stage heart failure  - Goals: spend more time at home, improvement in condition, would want a peaceful end of life  - Plans: agreed to continue current plan of care (dysphagia work up, continued diuresis). Would benefit from transparent expectations of what work-up would mean but also the realistic potential to find a curative solution for solid-food dysphagia. Desiring all efforts to improve condition including SNF    Goals of Care Conversation:  - 1/16/24: Supportive conversation held with Mr. Bhakta at bedside. He had just woken, noted how tired he had been. He expresses some frustration about his fatigue, feeling like he is not getting better or making the progress he hopes for. I asked him how his  "conversations with his daughter Annie have been and he nods his head in response, and tells me "She's going to have to accept it." I asked him to clarify what exactly he means, and he sighs in response, closes his eyes and stays in silence. Validated how frustrating it is to have to keep explaining himself and revisiting discussions that are difficult to endure. He nods in agreement. We make light conversation and eventually he tells me that he's had enough and is ready to rest again. I offered to call his daughter Annie and he tells me that he would prefer to talk to her himself. Acknowledged his need for privacy and to have these difficult conversations alone with his loved ones.   - 1/11/24: I met with Mr. Bhakta in his room alone, who is receptive of our encounter. Per primary team he had changed his code status to full code with the worries that without this full code status, care would be withheld from him and he desires full supportive care. He tells me that his daughter Annie is wanting him to stay in the hospital longer, hoping he will get better before he comes back home. I asked him if living with his son Papito Draper would be a possibility and he tells me "it wouldn't be the same" and he worries that he wouldn't get the care from his son like he was getting from his daughter. He sincerely wants to be able to live the rest of his days at home in peace, and thinks he needs to have another talk with Annie about this as she is not accepting that he is nearing his end of life. He shared that in 2019, he was on the ventilator and thankfully survived that hosptialization. But now he is in a different place with a different level of heart function, and is coming to terms that things are different now. He didn't understand what the plan of care was. I explained that the doctors told me that he came in with two major problems, and the threshold for discharge involves identifying whether these problems have solutions or " "will be chronic with no solutions before he is discharged. I told him that his two major problems was end stage heart failure exacerbation and solid-food dysphagia. Explained that's why he's on dobutamine and lasix drips to try to alleviate the fluid burden from his heart and lungs. Explained that his hospitalist doctors engaged the GI doctors to evaluate his upper GI tract with an endoscopy procedure, but this would require him to lay flat, which he cannot do right now due to the fluid overload. This is why he's getting aggressive diuresis. He expressed understanding. I shared that the endoscopy is an important test for the doctors with the best chance to find out what is going on, but this doesn't mean that it guarantees us a diagnosis or a solution. Discussed that this is truly the most helpful test, but set expectations that it doesn't guarantee answers that we seek. He nodded in understanding. He expressed gratefulness for this explanation.  - 1/10/24: I met Mr. Bhakta in his room and he was talking on his cell phone with his daughter Annie. Annie was telling him to reconsider and to stay in the hospital in hopes to get better. He said, "Ok, I was thinking only about myself, I'll stay." He finally did hang up the phone and he was open to our encounter. 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 He nodded appreciatively, was appropriately tearful as he lamented that he wished he had done things "better" in the past. He shares that while he has been in the hospital, he feels like he is only getting worse. He also acknowledges that so many of the things that bother him are little in the hospital (like a non-functional catheter, uncomfortable hospital bed that deflates repeatedly, etc) but that "the little things add up" " and he would rather be home. He is appropriately tearful as we talked about his end-stage heart failure, noting that he is on dobutamine which is a medicine that is artificially pumping his heart because his heart is too weak to do this on its own. I shared that dobutamine is by no means perfect, and will one day stop working in the future. We did talk about code status and I recommended that when the dobutamine stops working and his heart stops, that we allow him a peaceful, natural death and protect him from chest compressions/CPR which will not work. He was surprised by this recommendation when I told him that this is a DNR code status. I acknowledged that in the past, he was full code and it was appropriate because his heart was stronger. Now we are in a different place, and know that if even the current life saving measures he's on right now (dobutamine) stops working, it is a reflection that his heart is dying and that nothing more will actually help him. I shared that by performing CPR, we will knowingly inflict more pain/suffering without meaningful benefit. Acknowledged that he is a sukhdev human being, and it is our job to be thoughtful about how we help one another and also protect one another from things that will be harmful. I shared that often in the procedure room, DNR code statuses are reversed to full code, and will return back to DNR when he is done with the planned procedure, as the doctors would never send him to a procedure knowing he wouldn't survive. He expressed understanding and verbalized agreement to update his code status to DNR.  - He shared that his friend and his daughter will come later - he thinks they will talk about convincing him to stay a little longer. I shared that this is OK as it is apparent that he makes decisions about his life, thinking about the people he loves. He nods, shared that Annie's mother had  in  from Lupus complications, and this painful experience is  carried by their family. He tried to tell Annie that he wouldn't be living forever, but acknowledges that this is a difficult thing to accept. He says he would proceed with the doctor's recommendations. I shared with him that I agreed with him, that it is apparent in the last two weeks, nothing has truly helped him get better. Acknowledged that he came to the hospital with the hope to be able to walk away doing better or feeling better, but that doesn't mean as human beings that we can guarantee that as we are not God. He agreed. I shared my concerns that he will not get better, and during his last chapter, what would he want? He tells me that he wants to be at home. I reminded him that at any point he is realizing that he is wasting his time in the hospital when time is short, he can always tell his doctors that he'd like to focus his sukhdev time with the people he loves and in the comforts of home. He nodded in understanding, stating that he will take this one day at a time.             I will sign off. Please contact us if you have any additional questions. Will ensure a new patient appointment for Mr. Bhakta in palliative care clinic upon discharge as he was agreeable with this recommendation.    Subjective:     Chief Complaint:   Chief Complaint   Patient presents with    Leg Swelling     Fatigue and worsening lower extremity edema for past couple days, c exertional dyspnea, unable to walk now.       HPI:   Papito Bhakta is a 68-year-old man with a history of end stage NICM (EF 10-15%) s/p ICD, NYHA class IV who was admitted for severe heart failure exacerbation, solid-food dysphagia and found to be incidentally COVID positive. Palliative and Supportive Care was consulted to explore goals of care.    Hospital Course:  No notes on file    Interval History: Met with Mr. Bhakta, who had just woken. Supportive conversation held.    Past Medical History:   Diagnosis Date    RIGOBERTO (acute kidney injury) 10/7/2020     Anticoagulant long-term use     Aspirin    CHF (congestive heart failure)     Hyperlipidemia     Hypertension     Microcytic anemia 12/31/2023    NICM (nonischemic cardiomyopathy) 6/16/2020    Obesity     AMELIA (obstructive sleep apnea) 1/7/2022    Peripheral vascular disease, unspecified 2/1/2021       Past Surgical History:   Procedure Laterality Date    ESOPHAGOGASTRODUODENOSCOPY N/A 1/3/2024    Procedure: EGD (ESOPHAGOGASTRODUODENOSCOPY);  Surgeon: Vaughn Oliver MD;  Location: [facility] ENDO ([facility] FLR);  Service: Endoscopy;  Laterality: N/A;    ESOPHAGOGASTRODUODENOSCOPY N/A 1/5/2024    Procedure: EGD (ESOPHAGOGASTRODUODENOSCOPY);  Surgeon: Faith Juares MD;  [facility]  Service: Endoscopy;  Laterality: N/A;    INSERTION OF BIVENTRICULAR IMPLANTABLE CARDIOVERTER-DEFIBRILLATOR (ICD) N/A 02/13/2019    Procedure: INSERTION, ICD, BIVENTRICULAR;  Surgeon: Shailesh Eng MD;  Location: [facility] CATH LAB;  Service: Cardiology;  Laterality: N/A;  RN PRE OP 2-6-19  1ST CASE PER  RAD[facility]. NOTIFIED RADHA THAT ANESTHESIA IS NOT PITO FOR 1ST CASE START-LO    INSERTION OF BIVENTRICULAR IMPLANTABLE CARDIOVERTER-DEFIBRILLATOR (ICD) N/A 09/28/2020    Procedure: INSERTION, ICD, BIVENTRICULAR;  Surgeon: Jim Kwong MD;  Location: [facility] EP LAB;  Service: Cardiology;  Laterality: N/A;  NICM, CRT-D, SJM,, MAC, DM, 3 Prep*Wearing LifeVest*    oral extraction  11/2018    TESTICLE SURGERY         Review of patient's allergies indicates:   Allergen Reactions    Ramipril      Leg swelling and gynecomastia     Losartan Rash       Medications:  Continuous Infusions:   DOBUTamine IV infusion (non-titrating) 2.5 mcg/kg/min (01/14/24 0624)     Scheduled Meds:   amiodarone  200 mg Oral Daily    aspirin  325 mg Oral Daily    bumetanide  2 mg Oral BID loop    duke's soln (benadryl 30 mL, mylanta 30 mL, LIDOcaine 30 mL, nystatin 30 mL) 120 mL  10 mL Oral QID    empagliflozin  10 mg Oral Daily    heparin (porcine)  5,000 Units  Subcutaneous Q8H    pravastatin  80 mg Oral Daily    [START ON 1/17/2024] spironolactone  50 mg Oral Daily     PRN Meds:acetaminophen, dextrose 10%, dextrose 10%, glucagon (human recombinant), glucose, glucose, guaiFENesin 100 mg/5 ml, naloxone, sodium chloride 0.9%    Family History       Problem Relation (Age of Onset)    Epilepsy Mother          Tobacco Use    Smoking status: Former     Current packs/day: 0.00     Average packs/day: 0.5 packs/day for 40.0 years (20.0 ttl pk-yrs)     Types: Cigarettes, Cigars     Start date: 1974     Quit date: 2014     Years since quitting: 10.0     Passive exposure: Current    Smokeless tobacco: Never   Substance and Sexual Activity    Alcohol use: Yes     Comment: once a month beer or liquor    Drug use: No    Sexual activity: Not Currently     Birth control/protection: None     Comment: uses protection sometimes       Review of Systems   Constitutional:  Positive for activity change and fatigue.   HENT:  Positive for trouble swallowing.    Cardiovascular:  Positive for leg swelling.   Neurological:  Positive for weakness.     Objective:     Vital Signs (Most Recent):  Temp: 97.9 °F (36.6 °C) (01/16/24 1519)  Pulse: 74 (01/16/24 1519)  Resp: 18 (01/16/24 1519)  BP: 93/60 (01/16/24 1519)  SpO2: 98 % (01/16/24 1519) Vital Signs (24h Range):  Temp:  [97.4 °F (36.3 °C)-98.1 °F (36.7 °C)] 97.9 °F (36.6 °C)  Pulse:  [74-84] 74  Resp:  [17-20] 18  SpO2:  [98 %-100 %] 98 %  BP: ()/(54-64) 93/60     Weight: 98.6 kg (217 lb 6 oz)  Body mass index is 25.78 kg/m².       Physical Exam  HENT:      Head: Normocephalic and atraumatic.      Right Ear: External ear normal.      Left Ear: External ear normal.      Nose: Nose normal.      Mouth/Throat:      Mouth: Mucous membranes are dry.   Eyes:      General: Scleral icterus present.      Extraocular Movements: Extraocular movements intact.   Cardiovascular:      Rate and Rhythm: Normal rate.   Pulmonary:      Effort: Pulmonary effort is  normal.      Breath sounds: Normal breath sounds.   Abdominal:      General: Bowel sounds are normal.      Palpations: Abdomen is soft.   Musculoskeletal:         General: Swelling present.   Lymphadenopathy:      Cervical: No cervical adenopathy.   Skin:     General: Skin is warm and dry.   Neurological:      Mental Status: He is alert and oriented to person, place, and time. Mental status is at baseline.   Psychiatric:         Mood and Affect: Mood normal.         Behavior: Behavior normal.            Review of Symptoms      Symptom Assessment (ESAS 0-10 Scale)  Pain:  0  Dyspnea:  0  Anxiety:  0  Nausea:  0  Depression:  0  Anorexia:  0  Fatigue:  0  Insomnia:  0  Restlessness:  0  Agitation:  0         Psychosocial/Cultural:   See Palliative Psychosocial Note: No  Lives with daughter Annie. , wife  from [place] in . Has son named Papito Draper. Also lives with 9-year-old grandson, and has other grandchildren and great-grandchild.  **Primary  to Follow**  Palliative Care  Consult: No    Spiritual:  F - Taylor and Belief:  Restorationist  I - Importance:  Important  C - Community:  N/A  A - Address in Care:  Engage spiritual care        Advance Care Planning  Advance Directives:   Living Will: Yes    [place]POST: Yes    Do Not Resuscitate Status: Yes    Medical Power of : No      Decision Making:  Patient answered questions  Goals of Care: What is most important right now is to focus on spending time at home, improvement in condition but with limits to invasive therapies. Accordingly, we have decided that the best plan to meet the patient's goals includes continuing with treatment.         Significant Labs: CBC:   Recent Labs   Lab 01/15/24  0447 24  0350   WBC 7.05 6.84   HGB 12.6* 11.7*   HCT 39.3* 36.1*   PLT 76* 78*       CMP:   Recent Labs   Lab 01/15/24  0447 01/15/24  1530 24  0350   * 136 137   K 4.8 4.1 4.2   CL 97 95 97   CO2 23 30* 30*   GLU 69* 132* 74    BUN 41* 42* 41*   CREATININE 1.3 1.3 1.3   CALCIUM 9.2 9.2 9.2   PROT 6.9  --  6.6   ALBUMIN 2.2*  --  2.2*   BILITOT 7.6*  --  7.2*   ALKPHOS 112  --  106   AST 42*  --  35   ALT 28  --  28   ANIONGAP 15 11 10       CBC:   Recent Labs   Lab 01/16/24  0350   WBC 6.84   HGB 11.7*   HCT 36.1*   MCV 74*   PLT 78*       BMP:  Recent Labs   Lab 01/16/24  0350   GLU 74      K 4.2   CL 97   CO2 30*   BUN 41*   CREATININE 1.3   CALCIUM 9.2   MG 2.3       LFT:  Lab Results   Component Value Date    AST 35 01/16/2024    GGT 55 01/06/2024    ALKPHOS 106 01/16/2024    BILITOT 7.2 (H) 01/16/2024     Albumin:   Albumin   Date Value Ref Range Status   01/16/2024 2.2 (L) 3.5 - 5.2 g/dL Final     Protein:   Total Protein   Date Value Ref Range Status   01/16/2024 6.6 6.0 - 8.4 g/dL Final     Lactic acid:   Lab Results   Component Value Date    LACTATE 2.1 01/07/2024    LACTATE 2.3 (H) 01/01/2024       Significant Imaging: I have reviewed all pertinent imaging results/findings within the past 24 hours.    I spent a total of 50 minutes on the day of the visit. This includes face to face time in discussion of goals of care, symptom assessment, coordination of care and emotional support. This also includes non-face to face time preparing to see the patient (eg, review of tests/imaging), obtaining and/or reviewing separately obtained history, documenting clinical information in the electronic or other health record, independently interpreting results and communicating results to the patient/family/caregiver, or care coordinator.    Massiel Wiggins MD  Palliative Medicine  [facility] - Cardiology [facility]

## 2024-01-16 NOTE — SUBJECTIVE & OBJECTIVE
Interval History: NAEON. Patient had multiple BM following enema overnight. Lasix gtt stopped and patient transitioned to bumex 2 mg BID. Spironolactone increased to 50 mg QD for continued diuresis. Likely EGD on Thursday, will keep NPO @ [facility] in case.  i  Review of Systems   Constitutional:  Positive for fatigue. Negative for chills and fever.   HENT:  Positive for trouble swallowing.    Eyes:  Negative for visual disturbance.   Respiratory:  Negative for cough and shortness of breath.    Cardiovascular:  Positive for leg swelling. Negative for chest pain.   Gastrointestinal:  Negative for abdominal pain, diarrhea and nausea.   Genitourinary:  Negative for dysuria.   Neurological:  Positive for weakness. Negative for light-headedness and headaches.   Psychiatric/Behavioral:  Negative for agitation and confusion.      Objective:     Vital Signs (Most Recent):  Temp: 97.9 °F (36.6 °C) (01/16/24 1519)  Pulse: 74 (01/16/24 1519)  Resp: 18 (01/16/24 1519)  BP: 93/60 (01/16/24 1519)  SpO2: 98 % (01/16/24 1519) Vital Signs (24h Range):  Temp:  [97.4 °F (36.3 °C)-98.1 °F (36.7 °C)] 97.9 °F (36.6 °C)  Pulse:  [74-84] 74  Resp:  [17-20] 18  SpO2:  [98 %-100 %] 98 %  BP: ()/(54-64) 93/60     Weight: 98.6 kg (217 lb 6 oz)  Body mass index is 25.78 kg/m².    Intake/Output Summary (Last 24 hours) at 1/16/2024 1652  Last data filed at 1/16/2024 1430  Gross per 24 hour   Intake 702 ml   Output 2760 ml   Net -2058 ml         Physical Exam  Vitals and nursing note reviewed.   Constitutional:       Appearance: He is ill-appearing.   HENT:      Head: Normocephalic and atraumatic.      Mouth/Throat:      Mouth: Mucous membranes are dry.   Eyes:      Extraocular Movements: Extraocular movements intact.      Comments: Anisocoria   Neck:      Vascular: JVD (5cm) present.   Cardiovascular:      Rate and Rhythm: Normal rate and regular rhythm.   Pulmonary:      Effort: Pulmonary effort is normal. No respiratory distress.      Breath  sounds: Normal breath sounds. No wheezing.   Abdominal:      General: Abdomen is flat. There is no distension.      Palpations: Abdomen is soft.      Tenderness: There is no abdominal tenderness.   Musculoskeletal:      Right lower leg: Edema (improved) present.      Left lower leg: Edema (improved) present.   Skin:     General: Skin is warm and dry.   Neurological:      General: No focal deficit present.      Mental Status: He is alert and oriented to person, place, and time.      Motor: Weakness present.   Psychiatric:         Mood and Affect: Mood normal.         Behavior: Behavior normal.             Significant Labs: All pertinent labs within the past 24 hours have been reviewed.    Significant Imaging: I have reviewed all pertinent imaging results/findings within the past 24 hours.

## 2024-01-16 NOTE — PLAN OF CARE
NURSING HOME ORDERS    01/16/2024  [facility]  [facility] CARDIOLOGY [facility]PD[facility]  [address]  [facility] [address]  Dept: [phone]  Loc: [phone]     Admit to Nursing Home:  SNF    Diagnoses:  Active Hospital Problems    Diagnosis  POA    *Serum total bilirubin elevated [R17]  Yes    Encounter for palliative care [Z51.5]  Not Applicable    ACP (advance care planning) [Z71.89]  Not Applicable    COVID-19 [U07.1]  Yes    CKD (chronic kidney disease) [N18.9]  Yes    Dysphagia [R13.10]  Yes    Microcytic anemia [D50.9]  Yes    Pupil asymmetry [H57.02]  Yes    Acute on chronic combined systolic and diastolic CHF (congestive heart failure) [I50.43]  Yes    Acute renal failure superimposed on stage 3a chronic kidney disease [N17.9, N18.31]  Yes    AMELIA (obstructive sleep apnea) [G47.33]  Yes     The patient symptomatically has snoring, frequent awakening with findings of chf. This warrants further investigation for possible obstructive sleep apnea.  Patient declined at this time.  Per patient, prior sleep study at [facility] was normal.  Advise patient that his risk for amelia changes with aging and weight gain.  Aware of cardiovascular morbidities/mortality a/w untreated amelia.  Patient will contact patient should he changes his mind.         Peripheral vascular disease, unspecified [I73.9]  Yes    NICM (nonischemic cardiomyopathy) [I42.8]  Yes     Chronic     EF 10%.  S/p ppm and currently on amiodarone for vt.  Follow by Dr. Mckenzie      Congestive heart failure [I50.9]  Yes    Biventricular ICD (implantable cardioverter-defibrillator) in place [Z95.810]  Yes    Hyperlipidemia [E78.5]  Yes     Chronic    Acute hypoxemic respiratory failure [J96.01]  Yes    Essential hypertension [I10]  Yes     Chronic      Resolved Hospital Problems    Diagnosis Date Resolved POA    Vomiting without nausea [R11.11] 01/06/2024 Yes       Patient is homebound due to:  Serum total bilirubin  elevated    Allergies:  Review of patient's allergies indicates:   Allergen Reactions    Ramipril      Leg swelling and gynecomastia     Losartan Rash       Vitals:  Routine    Diet: fluid restriction: 1.5L    Activities:   Activity as tolerated    Goals of Care Treatment Preferences:  Code Status: Full Code    Living Will: Yes  LaPOST: Yes  What is most important right now is to focus on spending time at home, improvement in condition but with limits to invasive therapies.  Accordingly, we have decided that the best plan to meet the patient's goals includes continuing with treatment.      Labs:  as per facility    Nursing Precautions:  Aspiration  and Fall    Consults:   PT to evaluate and treat- 4 times a week and OT to evaluate and treat- 4 times a week     Miscellaneous Care: CHF Care: Daily Weight with notification of MD/NP of 2lb or > increase in 24 hours    v/s and O2 sat every shift    Oxygen as needed for sats <90%    Report abnormal breath sounds to MD/NP    Edema checks q shift- notify MD/NP of increased edema    Task segmentation by nursing for daily care to decrease exertion    Schedule appointment with cardiologist, at [facility] in 1 Weeks    CHF education to include diet ,medication, and CHF flags for MD notification                 Medications: Discontinue all previous medication orders, if any. See new list below.     Medication List        START taking these medications      bumetanide 2 MG tablet  Commonly known as: BUMEX  Take 1 tablet (2 mg total) by mouth 2 (two) times daily.     DOBUTamine 1,000 mg/250 mL (4,000 mcg/mL) infusion  Commonly known as: DOBUTREX  Inject 266.75 mcg/min into the vein continuous.     guaiFENesin 100 mg/5 ml 100 mg/5 mL syrup  Commonly known as: ROBITUSSIN  Take 10 mLs (200 mg total) by mouth every 4 (four) hours as needed for Cough or Congestion.     metOLazone 5 MG tablet  Commonly known as: ZAROXOLYN  Take 1 tablet (5 mg total) by mouth daily as needed  (Weight gain of greater than 3 lbs in one day or 5 lbs in 3 days).     senna-docusate 8.6-50 mg 8.6-50 mg per tablet  Commonly known as: PERICOLACE  Take 1 tablet by mouth daily as needed for Constipation.            CHANGE how you take these medications      spironolactone 25 MG tablet  Commonly known as: ALDACTONE  Take 1 tablet (25 mg total) by mouth once daily. HOLD IF SYSTOLIC BLOOD PRESSURE IS LESS THAN 110  What changed:   how much to take  how to take this  when to take this  additional instructions            CONTINUE taking these medications      amiodarone 200 MG Tab  Commonly known as: PACERONE  Take 1 tablet (200 mg total) by mouth once daily.     aspirin 325 MG EC tablet  Commonly known as: ECOTRIN  Take 1 tablet (325 mg total) by mouth once daily.     JARDIANCE 10 mg tablet  Generic drug: empagliflozin  Take 1 tablet (10 mg total) by mouth once daily.     metoprolol succinate 50 MG 24 hr tablet  Commonly known as: TOPROL-XL  TAKE 1 TABLET EVERY DAY     pravastatin 80 MG tablet  Commonly known as: PRAVACHOL  TAKE 1 TABLET EVERY DAY            STOP taking these medications      furosemide 80 MG tablet  Commonly known as: LASIX     potassium chloride 20 mEq  Commonly known as: K-TAB                Immunizations Administered as of 1/16/2024       Name Date Dose VIS Date Route Exp Date    COVID-19, MRNA, LN-S, PF (Moderna) 4/11/2022 0.25 mL -- Intramuscular --    Site: Left arm     Lot: [id number]     COVID-19, MRNA, LN-S, PF (Moderna) 3/26/2021 0.5 mL -- Intramuscular --    Site: Right deltoid     Lot: [id number]     COVID-19, MRNA, LN-S, PF (Moderna) 2/26/2021 0.5 mL -- Intramuscular --    Site: Left deltoid     Lot: [id number]     COVID-19, MRNA, LN-S, PF (Pfizer) (Purple Cap) 11/3/2022 0.5 mL -- Intramuscular --    Site: Left deltoid     Lot: [id number]             This patient has had both covid vaccinations    Some patients may experience side effects after vaccination.  These may include fever, headache, muscle  or joint aches.  Most symptoms resolve with 24-48 hours and do not require urgent medical evaluation unless they persist for more than 72 hours or symptoms are concerning for an unrelated medical condition.          _________________________________  Nova Malone MD  01/16/2024

## 2024-01-16 NOTE — ASSESSMENT & PLAN NOTE
"Papito Bhakta is a 68-year-old man with a history of end stage NICM (EF 10-15%) s/p ICD, NYHA class IV who was admitted for severe heart failure exacerbation, solid-food dysphagia and found to be incidentally COVID positive. During this admission, he was started on long-term dobutamine. Palliative and Supportive Care was consulted to explore goals of care.    Advance Care Planning   Goals of Care  - Code status: reversed to full code on 1/11/24  - Next of kin: two adult children, Annie (daughter) and Papito Draper (son)   - Lives with valeriy Valencia  - ACP documents: has LaPOST, but will need updating to reflect DNR code status  - Patient has decision making capacity  - Prognosis: poor, NYHA class IV for end-stage heart failure  - Goals: spend more time at home, improvement in condition, would want a peaceful end of life  - Plans: agreed to continue current plan of care (dysphagia work up, continued diuresis). Would benefit from transparent expectations of what work-up would mean but also the realistic potential to find a curative solution for solid-food dysphagia. Desiring all efforts to improve condition including SNF    Goals of Care Conversation:  - 1/16/24: Supportive conversation held with Mr. Bhakta at bedside. He had just woken, noted how tired he had been. He expresses some frustration about his fatigue, feeling like he is not getting better or making the progress he hopes for. I asked him how his conversations with his daughter Annie have been and he nods his head in response, and tells me "She's going to have to accept it." I asked him to clarify what exactly he means, and he sighs in response, closes his eyes and stays in silence. Validated how frustrating it is to have to keep explaining himself and revisiting discussions that are difficult to endure. He nods in agreement. We make light conversation and eventually he tells me that he's had enough and is ready to rest again. I offered to call his daughter " "Annie and he tells me that he would prefer to talk to her himself. Acknowledged his need for privacy and to have these difficult conversations alone with his loved ones.   - 1/11/24: I met with Mr. Bhakta in his room alone, who is receptive of our encounter. Per primary team he had changed his code status to full code with the worries that without this full code status, care would be withheld from him and he desires full supportive care. He tells me that his daughter Annie is wanting him to stay in the hospital longer, hoping he will get better before he comes back home. I asked him if living with his son Papito Draper would be a possibility and he tells me "it wouldn't be the same" and he worries that he wouldn't get the care from his son like he was getting from his daughter. He sincerely wants to be able to live the rest of his days at home in peace, and thinks he needs to have another talk with Annie about this as she is not accepting that he is nearing his end of life. He shared that in 2019, he was on the ventilator and thankfully survived that hosptialization. But now he is in a different place with a different level of heart function, and is coming to terms that things are different now. He didn't understand what the plan of care was. I explained that the doctors told me that he came in with two major problems, and the threshold for discharge involves identifying whether these problems have solutions or will be chronic with no solutions before he is discharged. I told him that his two major problems was end stage heart failure exacerbation and solid-food dysphagia. Explained that's why he's on dobutamine and lasix drips to try to alleviate the fluid burden from his heart and lungs. Explained that his hospitalist doctors engaged the GI doctors to evaluate his upper GI tract with an endoscopy procedure, but this would require him to lay flat, which he cannot do right now due to the fluid overload. This is why he's " "getting aggressive diuresis. He expressed understanding. I shared that the endoscopy is an important test for the doctors with the best chance to find out what is going on, but this doesn't mean that it guarantees us a diagnosis or a solution. Discussed that this is truly the most helpful test, but set expectations that it doesn't guarantee answers that we seek. He nodded in understanding. He expressed gratefulness for this explanation.  - 1/10/24: I met Mr. Bhakta in his room and he was talking on his cell phone with his daughter Annie. Annie was telling him to reconsider and to stay in the hospital in hopes to get better. He said, "Ok, I was thinking only about myself, I'll stay." He finally did hang up the phone and he was open to our encounter. 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 He nodded appreciatively, was appropriately tearful as he lamented that he wished he had done things "better" in the past. He shares that while he has been in the hospital, he feels like he is only getting worse. He also acknowledges that so many of the things that bother him are little in the hospital (like a non-functional catheter, uncomfortable hospital bed that deflates repeatedly, etc) but that "the little things add up" and he would rather be home. He is appropriately tearful as we talked about his end-stage heart failure, noting that he is on dobutamine which is a medicine that is artificially pumping his heart because his heart is too weak to do this on its own. I shared that dobutamine is by no means perfect, and will one day stop working in the future. We did talk about code status and I recommended that when the dobutamine stops working and his heart stops, that we allow him a peaceful, natural death and protect him from " chest compressions/CPR which will not work. He was surprised by this recommendation when I told him that this is a DNR code status. I acknowledged that in the past, he was full code and it was appropriate because his heart was stronger. Now we are in a different place, and know that if even the current life saving measures he's on right now (dobutamine) stops working, it is a reflection that his heart is dying and that nothing more will actually help him. I shared that by performing CPR, we will knowingly inflict more pain/suffering without meaningful benefit. Acknowledged that he is a sukhdev human being, and it is our job to be thoughtful about how we help one another and also protect one another from things that will be harmful. I shared that often in the procedure room, DNR code statuses are reversed to full code, and will return back to DNR when he is done with the planned procedure, as the doctors would never send him to a procedure knowing he wouldn't survive. He expressed understanding and verbalized agreement to update his code status to DNR.  - He shared that his friend and his daughter will come later - he thinks they will talk about convincing him to stay a little longer. I shared that this is OK as it is apparent that he makes decisions about his life, thinking about the people he loves. He nods, shared that Annie's mother had  in  from Lupus complications, and this painful experience is carried by their family. He tried to tell Annie that he wouldn't be living forever, but acknowledges that this is a difficult thing to accept. He says he would proceed with the doctor's recommendations. I shared with him that I agreed with him, that it is apparent in the last two weeks, nothing has truly helped him get better. Acknowledged that he came to the hospital with the hope to be able to walk away doing better or feeling better, but that doesn't mean as human beings that we can guarantee that as we are  not God. He agreed. I shared my concerns that he will not get better, and during his last chapter, what would he want? He tells me that he wants to be at home. I reminded him that at any point he is realizing that he is wasting his time in the hospital when time is short, he can always tell his doctors that he'd like to focus his sukhdev time with the people he loves and in the comforts of home. He nodded in understanding, stating that he will take this one day at a time.

## 2024-01-16 NOTE — ASSESSMENT & PLAN NOTE
Reports a 3 month history of inability to tolerate solid foods. Notable vomiting immediatly with swallowing food/liquid. No complaints of nausea. SLP has assessed him, can tolerate liquids.     - Planning for EGD this week  NPO @ [facility]  - Continue liquid diet, Boost TID  - GI cancelled EGD and signing off. Will re-consult once patient is more euvolemic and hemodynamically stable.

## 2024-01-16 NOTE — CARE UPDATE
At 4:17am I was notified that patient had an 11 beat run of Vtach. His morning labs were drawn and in process. Electrolytes from his last draw were reviewed and within normal limits.     I went to bedside to assess patient and he appeared asymptomatic. He denied any chest pain, shortness of breath, headache, changes in vision, chest tightness, palpitations, or nausea/vomiting.     Patient to remain on telemetry and will follow his labs to correct any electrolyte abnormalities.     Rob Trujillo MD

## 2024-01-16 NOTE — ASSESSMENT & PLAN NOTE
"Patient is identified as having Combined Systolic and Diastolic heart failure that is Acute on chronic. CHF is currently uncontrolled due to Pulmonary edema/pleural effusion on CXR. Latest ECHO performed and demonstrates- Results for orders placed during the hospital encounter of 11/10/23    Echo    Interpretation Summary    Left Ventricle: The left ventricle is severely dilated. Mildly increased wall thickness. There is mild concentric hypertrophy. Severe global hypokinesis present. There is severely reduced systolic function with a visually estimated ejection fraction of 10 -15%. Grade III diastolic dysfunction.    Right Ventricle: Severe right ventricular enlargement. Systolic function is severely reduced.    Mitral Valve: There is no stenosis. There is mild to moderate regurgitation with a centrally directed jet.    Tricuspid Valve: There is mild to moderate regurgitation.    Pulmonary Artery: The estimated pulmonary artery systolic pressure is 67 mmHg.  .Last BNP reviewed- and noted below   No results for input(s): "BNP", "BNPTRIAGEBLO" in the last 168 hours.      Presented for acute on chronic SOB with associated low UOP. Afebrile HDS. BNP 3,067, Troponin 0.031. CXR with cardiomegaly, vascular congestion, and edema. Received 100 of Lasix in the ED.    - Lasix and dobutamine gtt --> transitioned off lasix gtt on 1/16 to Bumex 2 mg BID  - RIP positive for Covid, treatment completed.  - continue home Jardiance   - Spironolactone at 25mg qd, increased to 50 QD  - Restart BB as tolerated  - Documented allergy to ACE/ARBs  - Cardiac diet with Fluid restriction at 1.5L with strict I/Os and daily STANDING weights  - Check Electrolytes, keep Mag >2 & K+ >4  - SCDs, Ambulate as tolerated    - Strict I&Os, Daily standing weights  - Review Low-salt diet and enforce medication adherence on discharge  "

## 2024-01-16 NOTE — NURSING
Notified by telemetry monitor team that the  patient had  an 11 beat  run  of Vtach  around 0400. When assessing him, he was  asymptomatic. Med 2 team member, Rob Trujillo MD  made aware. He states to  continue to monitor at  this time  and he will  be by to  assess the patient soon. Will  continue to monitor.

## 2024-01-16 NOTE — PLAN OF CARE
Problem: Adult Inpatient Plan of Care  Goal: Plan of Care Review  Outcome: Ongoing, Progressing  Flowsheets (Taken 1/16/2024 1529)  Plan of Care Reviewed With: patient  Goal: Absence of Hospital-Acquired Illness or Injury  Outcome: Ongoing, Progressing  Intervention: Identify and Manage Fall Risk  Flowsheets (Taken 1/16/2024 1529)  Safety Promotion/Fall Prevention: assistive device/personal item within reach  Intervention: Prevent Skin Injury  Flowsheets (Taken 1/16/2024 1529)  Body Position: position changed independently  Intervention: Prevent and Manage VTE (Venous Thromboembolism) Risk  Flowsheets (Taken 1/16/2024 1529)  Activity Management: Patient unable to perform activities     Problem: Fluid and Electrolyte Imbalance (Acute Kidney Injury/Impairment)  Goal: Fluid and Electrolyte Balance  Outcome: Ongoing, Progressing     Problem: Oral Intake Inadequate (Acute Kidney Injury/Impairment)  Goal: Optimal Nutrition Intake  Outcome: Ongoing, Progressing     Problem: Renal Function Impairment (Acute Kidney Injury/Impairment)  Goal: Effective Renal Function  Outcome: Ongoing, Progressing  Intervention: Monitor and Support Renal Function  Flowsheets (Taken 1/16/2024 1529)  Medication Review/Management: medications reviewed     Problem: Cardiac Output Decreased (Heart Failure)  Goal: Optimal Cardiac Output  Outcome: Ongoing, Progressing     Problem: Oral Intake Inadequate (Heart Failure)  Goal: Optimal Nutrition Intake  Outcome: Ongoing, Progressing  Intervention: Promote and Optimize Nutrition Intake  Flowsheets (Taken 1/16/2024 1529)  Oral Nutrition Promotion: calorie-dense foods provided     Problem: Respiratory Compromise (Heart Failure)  Goal: Effective Oxygenation and Ventilation  Outcome: Ongoing, Progressing

## 2024-01-16 NOTE — SUBJECTIVE & OBJECTIVE
Interval History: Met with Mr. Bhakta, who had just woken. Supportive conversation held.    Past Medical History:   Diagnosis Date    RIGOBERTO (acute kidney injury) 10/7/2020    Anticoagulant long-term use     Aspirin    CHF (congestive heart failure)     Hyperlipidemia     Hypertension     Microcytic anemia 12/31/2023    NICM (nonischemic cardiomyopathy) 6/16/2020    Obesity     AMELIA (obstructive sleep apnea) 1/7/2022    Peripheral vascular disease, unspecified 2/1/2021       Past Surgical History:   Procedure Laterality Date    ESOPHAGOGASTRODUODENOSCOPY N/A 1/3/2024    Procedure: EGD (ESOPHAGOGASTRODUODENOSCOPY);  Surgeon: Vaughn Oliver MD;  Location: [facility] ENDO ([address] [place] [address]);  Service: Endoscopy;  Laterality: N/A;    ESOPHAGOGASTRODUODENOSCOPY N/A 1/5/2024    Procedure: EGD (ESOPHAGOGASTRODUODENOSCOPY);  Surgeon: Faith Juares MD;  Location: [facility] ENDO [address]);  Service: Endoscopy;  Laterality: N/A;    INSERTION OF BIVENTRICULAR IMPLANTABLE CARDIOVERTER-DEFIBRILLATOR (ICD) N/A 02/13/2019    Procedure: INSERTION, ICD, BIVENTRICULAR;  Surgeon: Shailesh Eng MD;  Location: [facility] CATH LAB;  Service: Cardiology;  Laterality: N/A;  RN PRE OP 2-6-19  1ST CASE PER  RADHA. NOTIFIED RADHA THAT ANESTHESIA IS NOT PITO FOR 1ST CASE START-LO    INSERTION OF BIVENTRICULAR IMPLANTABLE CARDIOVERTER-DEFIBRILLATOR (ICD) N/A 09/28/2020    Procedure: INSERTION, ICD, BIVENTRICULAR;  Surgeon: Jim Kwong MD;  Location: [facility] EP LAB;  Service: Cardiology;  Laterality: N/A;  NICM, CRT-D, SJM,, MAC, DM, 3 Prep*Wearing LifeVest*    oral extraction  11/2018    TESTICLE SURGERY         Review of patient's allergies indicates:   Allergen Reactions    Ramipril      Leg swelling and gynecomastia     Losartan Rash       Medications:  Continuous Infusions:   DOBUTamine IV infusion (non-titrating) 2.5 mcg/kg/min (01/14/24 0624)     Scheduled Meds:   amiodarone  200 mg Oral Daily    aspirin  325 mg Oral Daily    bumetanide  2 mg Oral BID loop     duke's soln (benadryl 30 mL, mylanta 30 mL, LIDOcaine 30 mL, nystatin 30 mL) 120 mL  10 mL Oral QID    empagliflozin  10 mg Oral Daily    heparin (porcine)  5,000 Units Subcutaneous Q8H    pravastatin  80 mg Oral Daily    [START ON 1/17/2024] spironolactone  50 mg Oral Daily     PRN Meds:acetaminophen, dextrose 10%, dextrose 10%, glucagon (human recombinant), glucose, glucose, guaiFENesin 100 mg/5 ml, naloxone, sodium chloride 0.9%    Family History       Problem Relation (Age of Onset)    Epilepsy Mother          Tobacco Use    Smoking status: Former     Current packs/day: 0.00     Average packs/day: 0.5 packs/day for 40.0 years (20.0 ttl pk-yrs)     Types: Cigarettes, Cigars     Start date: 1974     Quit date: 2014     Years since quitting: 10.0     Passive exposure: Current    Smokeless tobacco: Never   Substance and Sexual Activity    Alcohol use: Yes     Comment: once a month beer or liquor    Drug use: No    Sexual activity: Not Currently     Birth control/protection: None     Comment: uses protection sometimes       Review of Systems   Constitutional:  Positive for activity change and fatigue.   HENT:  Positive for trouble swallowing.    Cardiovascular:  Positive for leg swelling.   Neurological:  Positive for weakness.     Objective:     Vital Signs (Most Recent):  Temp: 97.9 °F (36.6 °C) (01/16/24 1519)  Pulse: 74 (01/16/24 1519)  Resp: 18 (01/16/24 1519)  BP: 93/60 (01/16/24 1519)  SpO2: 98 % (01/16/24 1519) Vital Signs (24h Range):  Temp:  [97.4 °F (36.3 °C)-98.1 °F (36.7 °C)] 97.9 °F (36.6 °C)  Pulse:  [74-84] 74  Resp:  [17-20] 18  SpO2:  [98 %-100 %] 98 %  BP: ()/(54-64) 93/60     Weight: 98.6 kg (217 lb 6 oz)  Body mass index is 25.78 kg/m².       Physical Exam  HENT:      Head: Normocephalic and atraumatic.      Right Ear: External ear normal.      Left Ear: External ear normal.      Nose: Nose normal.      Mouth/Throat:      Mouth: Mucous membranes are dry.   Eyes:      General: Scleral  icterus present.      Extraocular Movements: Extraocular movements intact.   Cardiovascular:      Rate and Rhythm: Normal rate.   Pulmonary:      Effort: Pulmonary effort is normal.      Breath sounds: Normal breath sounds.   Abdominal:      General: Bowel sounds are normal.      Palpations: Abdomen is soft.   Musculoskeletal:         General: Swelling present.   Lymphadenopathy:      Cervical: No cervical adenopathy.   Skin:     General: Skin is warm and dry.   Neurological:      Mental Status: He is alert and oriented to person, place, and time. Mental status is at baseline.   Psychiatric:         Mood and Affect: Mood normal.         Behavior: Behavior normal.            Review of Symptoms      Symptom Assessment (ESAS 0-10 Scale)  Pain:  0  Dyspnea:  0  Anxiety:  0  Nausea:  0  Depression:  0  Anorexia:  0  Fatigue:  0  Insomnia:  0  Restlessness:  0  Agitation:  0         Psychosocial/Cultural:   See Palliative Psychosocial Note: No  Lives with daughter Annie. , wife  from [place] . Has son named Papito Allan Also lives with 9-year-old grandson, and has other grandchildren and great-grandchild.  **Primary  to Follow**  Palliative Care  Consult: No    Spiritual:  F - Taylor and Belief:  Anglican  I - Importance:  Important  C - Community:  N/A  A - Address in Care:  Engage spiritual care        Advance Care Planning   Advance Directives:   Living Will: Yes    LaPOST: Yes    Do Not Resuscitate Status: Yes    Medical Power of : No      Decision Making:  Patient answered questions  Goals of Care: What is most important right now is to focus on spending time at home, improvement in condition but with limits to invasive therapies. Accordingly, we have decided that the best plan to meet the patient's goals includes continuing with treatment.         Significant Labs: CBC:   Recent Labs   Lab 01/15/24  0447 24  0350   WBC 7.05 6.84   HGB 12.6* 11.7*   HCT 39.3* 36.1*    PLT 76* 78*       CMP:   Recent Labs   Lab 01/15/24  0447 01/15/24  1530 01/16/24  0350   * 136 137   K 4.8 4.1 4.2   CL 97 95 97   CO2 23 30* 30*   GLU 69* 132* 74   BUN 41* 42* 41*   CREATININE 1.3 1.3 1.3   CALCIUM 9.2 9.2 9.2   PROT 6.9  --  6.6   ALBUMIN 2.2*  --  2.2*   BILITOT 7.6*  --  7.2*   ALKPHOS 112  --  106   AST 42*  --  35   ALT 28  --  28   ANIONGAP 15 11 10       CBC:   Recent Labs   Lab 01/16/24  0350   WBC 6.84   HGB 11.7*   HCT 36.1*   MCV 74*   PLT 78*       BMP:  Recent Labs   Lab 01/16/24  0350   GLU 74      K 4.2   CL 97   CO2 30*   BUN 41*   CREATININE 1.3   CALCIUM 9.2   MG 2.3       LFT:  Lab Results   Component Value Date    AST 35 01/16/2024    GGT 55 01/06/2024    ALKPHOS 106 01/16/2024    BILITOT 7.2 (H) 01/16/2024     Albumin:   Albumin   Date Value Ref Range Status   01/16/2024 2.2 (L) 3.5 - 5.2 g/dL Final     Protein:   Total Protein   Date Value Ref Range Status   01/16/2024 6.6 6.0 - 8.4 g/dL Final     Lactic acid:   Lab Results   Component Value Date    LACTATE 2.1 01/07/2024    LACTATE 2.3 (H) 01/01/2024       Significant Imaging: I have reviewed all pertinent imaging results/findings within the past 24 hours.

## 2024-01-16 NOTE — PROGRESS NOTES
[facility] - Cardiology [facility] Medicine  Progress Note    Patient Name: Papito Bhakta  MRN: [id number]  Patient Class: IP- Inpatient   Admission Date: 12/29/2023  Length of Stay: 14 days  Attending Physician: Cayetano Somers MD  Primary Care Provider: Brynn To MD        Subjective:     Principal Problem:Serum total bilirubin elevated        HPI:  Papito Bhakta is a 68 y.o. male with NICM, combined CHF(11/2023 EF 10 -15%, G3DD) s/p ICD placement, CKD, HLD, HTN, and AMELIA who presents for bilateral lower extremity swelling and shortness of breath. Patient reports he has been having worsening shortness of breath for the past 6 months. He had acutely worsening SOB today where he described becoming profoundly dyspneic on exertion. He reported taking 2 of his 80 mg Lasix this morning with subsequent minimal urine output and minimal improvement in his SOB. He reports SOB aggravated with lying down and he requires frequent positional changes to keep comfortable. He reports additional poor appetite and urine output for the past week although he reports he has been drinking well. He had 2 episodes of nausea/vomiting this past week as well. He denies fever/chills, chest pain, abdominal pain, recent illness, medication changes. Patient reports adherence with all medications. He reports he lives with his daughter who helps him with his medications and healthcare.    Additionally he reports chronic leg pain from a chronic RLE wound. He went to wound care yesterday where dressings were changed and he was told they were healing well. He has bilateral lower extremity edema R>L that is typical for him and he denies recent worsening.    Overview/Hospital Course:  Pt admitted to hospital medicine for acute heart failure exacerbation and inability to swallow solids for 3 months duration. Initiated on IV lasix. SLP evaluated the patient and determined that he could tolerate liquids. He had a new leukocytosis noted on  "12/31, work up significant for COVID positive. He completed remdesivir, steroids held in order to prevent worsening fluid retention, and was able to be weaned to room oxygen by 1/4. Overnight 12/31, patient becane tachycardic and hypotensive (asymptomatic). Cardiology called and concluded that he was in atrial fibrillation with RVR and he was briefly changed from oral amiodarone to IV amiodarone for one day before resuming his oral dosing. PM interrogated, noted to be functioning accurately. Cardiology signed off. IV lasix increased from pushes to gtt. Urine output did not significantly increase, bladder scan with 325ml urine. Mcmahon placed.     GI was consulted for his ongoing dysphagia. With his ongoing aggressive diuresis, COVID infection, and episode of hemodynamic stability, the EGD was deferred until he becomes more euvolemic.     Transient episode of increased respiratory distress 1/6. EKG without significant changes from prior. Troponin negative. CXR showed stability from prior imaging.    During his admission, noted to have chronic bilirubinemia, assumed likely secondary to congestive hepatopathy. RUQ US without biliary obstruction. Hemolysis labs negative. Tbili continued to uptrend despite improvement in fluid status and renal function. Also noted to have worsening thrombocytopenia, despite normal LFTs. Hepatology consulted, who agree with congestive hepatopathy. Further work up pending. He has chronic lower extremity leg wounds secondary to peripheral vascular disease. Wound care was consulted and assisted in managing the wounds during his admission. He has outpatient follow-up scheduled with vascular surgery at the end of February. Cardiology recommending aggressive diuresis, started him on a dobutamine gtt and continued lasix gtt.    1/10 expressing desire to stop further treatment and to discharge to home. States he wishes to live comfortably at home and "if it's my time it's my time". Palliative care " consulted. 1/11 expressing desire to continue treatment and change back to full code.    Tunneled line placed for dobutamine infusion. Continuing diuresis with lasix gtt. Planning for EGD this week.    Interval History: NAEON. Patient had multiple BM following enema overnight. Lasix gtt stopped and patient transitioned to bumex 2 mg BID. Spironolactone increased to 50 mg QD for continued diuresis. Likely EGD on Thursday, will keep NPO @ [facility] in case.  i  Review of Systems   Constitutional:  Positive for fatigue. Negative for chills and fever.   HENT:  Positive for trouble swallowing.    Eyes:  Negative for visual disturbance.   Respiratory:  Negative for cough and shortness of breath.    Cardiovascular:  Positive for leg swelling. Negative for chest pain.   Gastrointestinal:  Negative for abdominal pain, diarrhea and nausea.   Genitourinary:  Negative for dysuria.   Neurological:  Positive for weakness. Negative for light-headedness and headaches.   Psychiatric/Behavioral:  Negative for agitation and confusion.      Objective:     Vital Signs (Most Recent):  Temp: 97.9 °F (36.6 °C) (01/16/24 1519)  Pulse: 74 (01/16/24 1519)  Resp: 18 (01/16/24 1519)  BP: 93/60 (01/16/24 1519)  SpO2: 98 % (01/16/24 1519) Vital Signs (24h Range):  Temp:  [97.4 °F (36.3 °C)-98.1 °F (36.7 °C)] 97.9 °F (36.6 °C)  Pulse:  [74-84] 74  Resp:  [17-20] 18  SpO2:  [98 %-100 %] 98 %  BP: ()/(54-64) 93/60     Weight: 98.6 kg (217 lb 6 oz)  Body mass index is 25.78 kg/m².    Intake/Output Summary (Last 24 hours) at 1/16/2024 1652  Last data filed at 1/16/2024 1430  Gross per 24 hour   Intake 702 ml   Output 2760 ml   Net -2058 ml         Physical Exam  Vitals and nursing note reviewed.   Constitutional:       Appearance: He is ill-appearing.   HENT:      Head: Normocephalic and atraumatic.      Mouth/Throat:      Mouth: Mucous membranes are dry.   Eyes:      Extraocular Movements: Extraocular movements intact.      Comments: Anisocoria    Neck:      Vascular: JVD (5cm) present.   Cardiovascular:      Rate and Rhythm: Normal rate and regular rhythm.   Pulmonary:      Effort: Pulmonary effort is normal. No respiratory distress.      Breath sounds: Normal breath sounds. No wheezing.   Abdominal:      General: Abdomen is flat. There is no distension.      Palpations: Abdomen is soft.      Tenderness: There is no abdominal tenderness.   Musculoskeletal:      Right lower leg: Edema (improved) present.      Left lower leg: Edema (improved) present.   Skin:     General: Skin is warm and dry.   Neurological:      General: No focal deficit present.      Mental Status: He is alert and oriented to person, place, and time.      Motor: Weakness present.   Psychiatric:         Mood and Affect: Mood normal.         Behavior: Behavior normal.             Significant Labs: All pertinent labs within the past 24 hours have been reviewed.    Significant Imaging: I have reviewed all pertinent imaging results/findings within the past 24 hours.    Assessment/Plan:      * Serum total bilirubin elevated  Continues to increase. Direct bilirubinemia. No signs of hemolysis. Likely 2/2 to congestive hepatopathy. Improving.    - Continuing to diurese  - Daily CMPs  - Appears chronic since November. Previously reported as secondary to congestive hepatopathy however remaining LFTs likely WNL.   - US Liver with doppler showing: Bidirectional portal vein flow may be seen with right heart failure, tricuspid regurgitation, or portal hypertension. Evidence of volume overload with distended IVC and hepatic veins.  Pulsatile flow through the hepatic veins (also possibly suggesting tricuspid regurgitation). Suspected hepatic steatosis. No evidence of biliary obstruction.  Possible genetic condition      ACP (advance care planning)  1/10, patient stating desire to go home. He is tired of his prolonged hospital course and feeling defeated at the slow trajectory of his remaining course.  "States he wants to be comfortable at home, "if it's my time it's my time". We discussed benefits of staying, include improved quality of life if we are able to medically optimize  Palliative care consulted, greatly appreciate assistance. With this conversation, along with conversations with family, he has agreed to stay for now. Will continue to engage in GOC conversations.     DNR order placed per palliative  Need to assess utility of ICD    1/11, patient now stating that he would like all interventions done if needed and does not want ICD turned off  Order placed to make patient Full Code       CKD (chronic kidney disease)  Creatine stable for now. BMP reviewed- noted Estimated Creatinine Clearance: 68.5 mL/min (based on SCr of 1.3 mg/dL). according to latest data. Based on current GFR, CKD stage is stage 3 - GFR 30-59.  Monitor UOP and serial BMP and adjust therapy as needed. Renally dose meds. Avoid nephrotoxic medications and procedures.    COVID-19  COVID positive, noted 12/31    - Finished remdesivir course 01/02/24  No residual symptoms    Microcytic anemia  Iron studies notable for Fe 26 and sat iron 8%. TIBC, ferritin, transferrin wnl. Likely iron deficiency anemia.    - Daily CBCs  - Feraheme given 1/4/24    Dysphagia  Reports a 3 month history of inability to tolerate solid foods. Notable vomiting immediatly with swallowing food/liquid. No complaints of nausea. SLP has assessed him, can tolerate liquids.     - Planning for EGD this week  NPO @ [facility]  - Continue liquid diet, Boost TID  - GI cancelled EGD and signing off. Will re-consult once patient is more euvolemic and hemodynamically stable.       Pupil asymmetry  Notable asymetry on pupils by patient. Chart review shows left orbital trauma in 2021 with notes concerned for dilation and vision changes. When talking to daughter, this is chronic. States no vision changes or neuro symptoms whatsoever.     Acute on chronic combined systolic and diastolic CHF " "(congestive heart failure)  Patient is identified as having Combined Systolic and Diastolic heart failure that is Acute on chronic. CHF is currently uncontrolled due to Pulmonary edema/pleural effusion on CXR. Latest ECHO performed and demonstrates- Results for orders placed during the hospital encounter of 11/10/23    Echo    Interpretation Summary    Left Ventricle: The left ventricle is severely dilated. Mildly increased wall thickness. There is mild concentric hypertrophy. Severe global hypokinesis present. There is severely reduced systolic function with a visually estimated ejection fraction of 10 -15%. Grade III diastolic dysfunction.    Right Ventricle: Severe right ventricular enlargement. Systolic function is severely reduced.    Mitral Valve: There is no stenosis. There is mild to moderate regurgitation with a centrally directed jet.    Tricuspid Valve: There is mild to moderate regurgitation.    Pulmonary Artery: The estimated pulmonary artery systolic pressure is 67 mmHg.  .Last BNP reviewed- and noted below   No results for input(s): "BNP", "BNPTRIAGEBLO" in the last 168 hours.      Presented for acute on chronic SOB with associated low UOP. Afebrile HDS. BNP 3,067, Troponin 0.031. CXR with cardiomegaly, vascular congestion, and edema. Received 100 of Lasix in the ED.    - Lasix and dobutamine gtt --> transitioned off lasix gtt on 1/16 to Bumex 2 mg BID  - RIP positive for Covid, treatment completed.  - continue home Jardiance   - Spironolactone at 25mg qd, increased to 50 QD  - Restart BB as tolerated  - Documented allergy to ACE/ARBs  - Cardiac diet with Fluid restriction at 1.5L with strict I/Os and daily STANDING weights  - Check Electrolytes, keep Mag >2 & K+ >4  - SCDs, Ambulate as tolerated    - Strict I&Os, Daily standing weights  - Review Low-salt diet and enforce medication adherence on discharge    Acute renal failure superimposed on stage 3a chronic kidney disease  Creatinine 2.1 on admit, " baseline around 1.4. Likely prerenal etiology given urine sodium and FENa vs progression of CKD. Improving with increased diuresis     Recent Labs     01/15/24  1530 01/16/24  0350 01/16/24  1547   BUN 42* 41* 41*   CREATININE 1.3 1.3 1.3         Estimated Creatinine Clearance: 68.5 mL/min (based on SCr of 1.3 mg/dL).  RESOLVED    - Renal US: renal cysts, no hydronephrosis  - Urine Na: 14, Pr/Cr: 0.72; FENa 0.2%  - Monitor urine output and serial BMP and adjust therapy as needed.   - Strict I&Os and daily weights   - Avoid nephrotoxic agents such as NSAIDs, gadolinium and IV radiocontrast.  - Renally dose meds to current GFR.  - Maintain MAP > 65.  - Nephrology referral outpatient    AMELIA (obstructive sleep apnea)  Carried diagnosis of AMELIA per chart, however he does not sleep with CPAP at home and declines while here      Peripheral vascular disease, unspecified  Patient with chronic lower leg wounds that recently established with wound care. He is set to follow up with vascular surgery outpatient at the end of February.    Appreciate wound care recommendations  Continue diuresis       NICM (nonischemic cardiomyopathy)  ASA 325mg qd per home medication list. Reason for this dose unclear.      Biventricular ICD (implantable cardioverter-defibrillator) in place  Stable on admission, no acute issues, he denies discharge. QTc chronically prolonged. Continue home amiodarone for NSVT prevention. Monitor on telemetry     - 12/31, noted to have AF RVR with hypotension. Started on amio gtt. Per report, pacemaker malfunctioning.  - Device interrogated, no complications. EP has signed off and pt is back on home amio    Hyperlipidemia  Stable. Continue Home Pravastatin.        Acute hypoxemic respiratory failure  Patient with Hypoxic Respiratory failure which is Acute.  he is not on home oxygen. Supplemental oxygen was provided and noted-      Patient with persistent O2 requirement of 2-3 LPM. Largest contribution likely due to  fluid overload in setting of heart failure exacerbation and possible urinary retention. Also found to be COVID positive, s/p course of remdesivir. With his persistent hypoxia, possible that he is developing a post viral pneumonia, or an aspiration pneumonia in setting of dysphagia.   Troponin and repeat EKG WNL, concern for ACS low.   He does have baseline arrhythmia with PM in place, recently interrogated. No AS on recent TTE.    - Continue lasix gtt (see acute on chronic HF) and dobutamine gtt  - Tunneled line placed 1/12/24 for dobutamine infusion  - Adding GDMT as tolerated  - Wean O2 as tolerated  - BNP repeated and still elevated at 2503.  - Consulting cardiology for failure to improve despite diuresis  - PT/OT consulted and following    Essential hypertension  Chronic, controlled. Latest blood pressure and vitals reviewed-     Temp:  [96.5 °F (35.8 °C)-97.8 °F (36.6 °C)]   Pulse:  [76-82]   Resp:  [18-22]   BP: ()/(53-71)   SpO2:  [94 %-100 %] .   Home meds for hypertension were reviewed and noted below.   Hypertension Medications               furosemide (LASIX) 80 MG tablet TAKE 1 TABLET EVERY DAY    metoprolol succinate (TOPROL-XL) 50 MG 24 hr tablet TAKE 1 TABLET EVERY DAY    spironolactone (ALDACTONE) 25 MG tablet TAKE 1/2 TABLET (12.5 MG TOTAL) ONCE DAILY. HOLD IF SYSTOLIC BLOOD PRESSURE IS LESS THAN 110            While in the hospital, will manage blood pressure as follows; Adjust home antihypertensive regimen as follows- Holding in setting of borderline pressures in setting of new diuresis     Will utilize p.r.n. blood pressure medication only if patient's blood pressure greater than 180/110 and he develops symptoms such as worsening chest pain or shortness of breath.      VTE Risk Mitigation (From admission, onward)           Ordered     heparin (porcine) injection 5,000 Units  Every 8 hours         12/29/23 1759     IP VTE HIGH RISK PATIENT  Once         12/29/23 1759     Place sequential  compression device  Until discontinued         12/29/23 1759                    Discharge Planning   MARIIA: 1/17/2024     Code Status: Full Code   Is the patient medically ready for discharge?: No    Reason for patient still in hospital (select all that apply): Patient trending condition and Treatment  Discharge Plan A: Home with family, Home Health                  Nova Malone MD  Department of Hospital Medicine   [facility] - Cardiology Stepdown

## 2024-01-16 NOTE — ASSESSMENT & PLAN NOTE
Creatinine 2.1 on admit, baseline around 1.4. Likely prerenal etiology given urine sodium and FENa vs progression of CKD. Improving with increased diuresis     Recent Labs     01/15/24  1530 01/16/24  0350 01/16/24  1547   BUN 42* 41* 41*   CREATININE 1.3 1.3 1.3         Estimated Creatinine Clearance: 68.5 mL/min (based on SCr of 1.3 mg/dL).  RESOLVED    - Renal US: renal cysts, no hydronephrosis  - Urine Na: 14, Pr/Cr: 0.72; FENa 0.2%  - Monitor urine output and serial BMP and adjust therapy as needed.   - Strict I&Os and daily weights   - Avoid nephrotoxic agents such as NSAIDs, gadolinium and IV radiocontrast.  - Renally dose meds to current GFR.  - Maintain MAP > 65.  - Nephrology referral outpatient

## 2024-01-17 ENCOUNTER — ANESTHESIA EVENT (OUTPATIENT)
Dept: ENDOSCOPY | Facility: HOSPITAL | Age: 69
DRG: 291 | End: 2024-01-17
Payer: MEDICARE

## 2024-01-17 LAB
ALBUMIN SERPL BCP-MCNC: 2 G/DL (ref 3.5–5.2)
ALP SERPL-CCNC: 100 U/L (ref 55–135)
ALT SERPL W/O P-5'-P-CCNC: 25 U/L (ref 10–44)
ANION GAP SERPL CALC-SCNC: 10 MMOL/L (ref 8–16)
ANION GAP SERPL CALC-SCNC: 11 MMOL/L (ref 8–16)
AST SERPL-CCNC: 27 U/L (ref 10–40)
BILIRUB SERPL-MCNC: 6.8 MG/DL (ref 0.1–1)
BUN SERPL-MCNC: 38 MG/DL (ref 8–23)
BUN SERPL-MCNC: 41 MG/DL (ref 8–23)
CALCIUM SERPL-MCNC: 9 MG/DL (ref 8.7–10.5)
CALCIUM SERPL-MCNC: 9.3 MG/DL (ref 8.7–10.5)
CHLORIDE SERPL-SCNC: 97 MMOL/L (ref 95–110)
CHLORIDE SERPL-SCNC: 98 MMOL/L (ref 95–110)
CO2 SERPL-SCNC: 29 MMOL/L (ref 23–29)
CO2 SERPL-SCNC: 30 MMOL/L (ref 23–29)
CREAT SERPL-MCNC: 1.3 MG/DL (ref 0.5–1.4)
CREAT SERPL-MCNC: 1.3 MG/DL (ref 0.5–1.4)
ERYTHROCYTE [DISTWIDTH] IN BLOOD BY AUTOMATED COUNT: 24.7 % (ref 11.5–14.5)
EST. GFR  (NO RACE VARIABLE): 59.8 ML/MIN/1.73 M^2
EST. GFR  (NO RACE VARIABLE): 59.8 ML/MIN/1.73 M^2
GLUCOSE SERPL-MCNC: 119 MG/DL (ref 70–110)
GLUCOSE SERPL-MCNC: 91 MG/DL (ref 70–110)
HCT VFR BLD AUTO: 35.3 % (ref 40–54)
HGB BLD-MCNC: 11.3 G/DL (ref 14–18)
MAGNESIUM SERPL-MCNC: 2.4 MG/DL (ref 1.6–2.6)
MCH RBC QN AUTO: 24 PG (ref 27–31)
MCHC RBC AUTO-ENTMCNC: 32 G/DL (ref 32–36)
MCV RBC AUTO: 75 FL (ref 82–98)
PHOSPHATE SERPL-MCNC: 3.4 MG/DL (ref 2.7–4.5)
PLATELET # BLD AUTO: 82 K/UL (ref 150–450)
PMV BLD AUTO: ABNORMAL FL (ref 9.2–12.9)
POTASSIUM SERPL-SCNC: 3.8 MMOL/L (ref 3.5–5.1)
POTASSIUM SERPL-SCNC: 3.9 MMOL/L (ref 3.5–5.1)
PROT SERPL-MCNC: 6.1 G/DL (ref 6–8.4)
RBC # BLD AUTO: 4.71 M/UL (ref 4.6–6.2)
SODIUM SERPL-SCNC: 137 MMOL/L (ref 136–145)
SODIUM SERPL-SCNC: 138 MMOL/L (ref 136–145)
WBC # BLD AUTO: 8.09 K/UL (ref 3.9–12.7)

## 2024-01-17 PROCEDURE — 25000003 PHARM REV CODE 250: Performed by: STUDENT IN AN ORGANIZED HEALTH CARE EDUCATION/TRAINING PROGRAM

## 2024-01-17 PROCEDURE — 85027 COMPLETE CBC AUTOMATED: CPT | Performed by: STUDENT IN AN ORGANIZED HEALTH CARE EDUCATION/TRAINING PROGRAM

## 2024-01-17 PROCEDURE — 36415 COLL VENOUS BLD VENIPUNCTURE: CPT | Performed by: STUDENT IN AN ORGANIZED HEALTH CARE EDUCATION/TRAINING PROGRAM

## 2024-01-17 PROCEDURE — 25000003 PHARM REV CODE 250

## 2024-01-17 PROCEDURE — 97530 THERAPEUTIC ACTIVITIES: CPT

## 2024-01-17 PROCEDURE — 80048 BASIC METABOLIC PNL TOTAL CA: CPT | Mod: XB

## 2024-01-17 PROCEDURE — 20600001 HC STEP DOWN PRIVATE ROOM

## 2024-01-17 PROCEDURE — 63600175 PHARM REV CODE 636 W HCPCS

## 2024-01-17 PROCEDURE — 36415 COLL VENOUS BLD VENIPUNCTURE: CPT | Mod: XB

## 2024-01-17 PROCEDURE — 25000242 PHARM REV CODE 250 ALT 637 W/ HCPCS

## 2024-01-17 PROCEDURE — 97535 SELF CARE MNGMENT TRAINING: CPT

## 2024-01-17 PROCEDURE — 83735 ASSAY OF MAGNESIUM: CPT | Performed by: STUDENT IN AN ORGANIZED HEALTH CARE EDUCATION/TRAINING PROGRAM

## 2024-01-17 PROCEDURE — 97110 THERAPEUTIC EXERCISES: CPT

## 2024-01-17 PROCEDURE — 80053 COMPREHEN METABOLIC PANEL: CPT | Performed by: STUDENT IN AN ORGANIZED HEALTH CARE EDUCATION/TRAINING PROGRAM

## 2024-01-17 PROCEDURE — 97116 GAIT TRAINING THERAPY: CPT

## 2024-01-17 PROCEDURE — 84100 ASSAY OF PHOSPHORUS: CPT | Performed by: STUDENT IN AN ORGANIZED HEALTH CARE EDUCATION/TRAINING PROGRAM

## 2024-01-17 RX ADMIN — SPIRONOLACTONE 50 MG: 25 TABLET ORAL at 11:01

## 2024-01-17 RX ADMIN — AMIODARONE HYDROCHLORIDE 200 MG: 200 TABLET ORAL at 11:01

## 2024-01-17 RX ADMIN — ALUMINUM HYDROXIDE, MAGNESIUM HYDROXIDE, AND DIMETHICONE 10 ML: 400; 400; 40 SUSPENSION ORAL at 09:01

## 2024-01-17 RX ADMIN — ALUMINUM HYDROXIDE, MAGNESIUM HYDROXIDE, AND DIMETHICONE 10 ML: 400; 400; 40 SUSPENSION ORAL at 01:01

## 2024-01-17 RX ADMIN — BUMETANIDE 2 MG: 1 TABLET ORAL at 06:01

## 2024-01-17 RX ADMIN — HEPARIN SODIUM 5000 UNITS: 5000 INJECTION INTRAVENOUS; SUBCUTANEOUS at 03:01

## 2024-01-17 RX ADMIN — HEPARIN SODIUM 5000 UNITS: 5000 INJECTION INTRAVENOUS; SUBCUTANEOUS at 05:01

## 2024-01-17 RX ADMIN — ALUMINUM HYDROXIDE, MAGNESIUM HYDROXIDE, AND DIMETHICONE 10 ML: 400; 400; 40 SUSPENSION ORAL at 06:01

## 2024-01-17 RX ADMIN — ASPIRIN 325 MG: 325 TABLET, COATED ORAL at 11:01

## 2024-01-17 RX ADMIN — HEPARIN SODIUM 5000 UNITS: 5000 INJECTION INTRAVENOUS; SUBCUTANEOUS at 09:01

## 2024-01-17 RX ADMIN — PRAVASTATIN SODIUM 80 MG: 40 TABLET ORAL at 11:01

## 2024-01-17 RX ADMIN — EMPAGLIFLOZIN 10 MG: 10 TABLET, FILM COATED ORAL at 11:01

## 2024-01-17 RX ADMIN — BUMETANIDE 2 MG: 1 TABLET ORAL at 11:01

## 2024-01-17 NOTE — PLAN OF CARE
[facility] - Cardiology Stepdown      HOME HEALTH ORDERS  FACE TO FACE ENCOUNTER    Patient Name: Papito Bhakta  YOB: 1955    PCP: Brynn To MD   PCP Address: [address] / [place] [address]  PCP Phone Number: [phone]  PCP Fax: [phone]    Encounter Date: 12/29/23    Admit to Home Health    Diagnoses:  Active Hospital Problems    Diagnosis  POA    *Serum total bilirubin elevated [R17]  Yes    Encounter for palliative care [Z51.5]  Not Applicable    ACP (advance care planning) [Z71.89]  Not Applicable    COVID-19 [U07.1]  Yes    CKD (chronic kidney disease) [N18.9]  Yes    Dysphagia [R13.10]  Yes    Microcytic anemia [D50.9]  Yes    Pupil asymmetry [H57.02]  Yes    Acute on chronic combined systolic and diastolic CHF (congestive heart failure) [I50.43]  Yes    Acute renal failure superimposed on stage 3a chronic kidney disease [N17.9, N18.31]  Yes    AMELIA (obstructive sleep apnea) [G47.33]  Yes     The patient symptomatically has snoring, frequent awakening with findings of chf. This warrants further investigation for possible obstructive sleep apnea.  Patient declined at this time.  Per patient, prior sleep study at [facility] was normal.  Advise patient that his risk for amelia changes with aging and weight gain.  Aware of cardiovascular morbidities/mortality a/w untreated amelia.  Patient will contact patient should he changes his mind.         Peripheral vascular disease, unspecified [I73.9]  Yes    NICM (nonischemic cardiomyopathy) [I42.8]  Yes     Chronic     EF 10%.  S/p ppm and currently on amiodarone for vt.  Follow by Dr. Mckenzie      Congestive heart failure [I50.9]  Yes    Biventricular ICD (implantable cardioverter-defibrillator) in place [Z95.810]  Yes    Hyperlipidemia [E78.5]  Yes     Chronic    Acute hypoxemic respiratory failure [J96.01]  Yes    Essential hypertension [I10]  Yes     Chronic      Resolved Hospital Problems    Diagnosis Date Resolved POA    Vomiting without nausea  [R11.11] 01/06/2024 Yes       Follow Up Appointments:  Future Appointments   Date Time Provider Department Center   1/19/2024 11:00 AM LAB, APPOINTMENT [place] [facility] LAB VNP [facility]   1/19/2024 11:30 AM Vidya Traore PA-C [facility]   1/19/2024 11:30 AM [facility] HEART FAILURE NURSE [facility]   2/20/2024 10:00 AM [facility]ccoy MD PhD [facility]   2/29/2024 10:00 AM EKG, APPT [facility] EKG [facility]   2/29/2024 10:20 AM COORDINATED DEVICE CHECK [facility] ARRHPRO [facility]   2/29/2024 11:00 AM Juliana Mckenzie NP [facility] ARRHYTH [facility]       Allergies:  Review of patient's allergies indicates:   Allergen Reactions    Ramipril      Leg swelling and gynecomastia     Losartan Rash       Medications: Review discharge medications with patient and family and provide education.    Current Facility-Administered Medications   Medication Dose Route Frequency Provider Last Rate Last Admin    acetaminophen tablet 650 mg  650 mg Oral Q6H PRN Chavo Alvarenga MD   650 mg at 01/11/24 2133    amiodarone tablet 200 mg  200 mg Oral Daily Deepa Holly MD   200 mg at 01/17/24 1116    aspirin EC tablet 325 mg  325 mg Oral Daily Chavo Alvarenga MD   325 mg at 01/17/24 1116    bumetanide tablet 2 mg  2 mg Oral BID loop Nova Malone MD   2 mg at 01/17/24 1110    dextrose 10% bolus 125 mL 125 mL  12.5 g Intravenous PRN Chavo Alvarenga MD        dextrose 10% bolus 250 mL 250 mL  25 g Intravenous PRN Chavo Alvarenga MD   Stopped at 12/30/23 0757    DOBUtamine 1000 mg in D5W 250 mL infusion  2.5 mcg/kg/min Intravenous Continuous Deepa Holly MD 4 mL/hr at 01/16/24 1748 2.5 mcg/kg/min at 01/16/24 1748    duke's soln (benadryl 30 mL, mylanta 30 mL, LIDOcaine 30 mL, nystatin 30 mL) 120 mL  10 mL Oral QID Jayda Jennings DO   10 mL at 01/17/24 1337    empagliflozin (Jardiance) tablet 10 mg  10 mg Oral Daily Chavo Alvarenga MD   10 mg at 01/17/24 1117    glucagon (human recombinant) injection 1 mg  1 mg  Intramuscular PRN Chavo Alvarenga MD        glucose chewable tablet 16 g  16 g Oral PRN Chavo Alvarenga MD        glucose chewable tablet 24 g  24 g Oral PRN Chavo Alvarenga MD        guaiFENesin 100 mg/5 ml syrup 200 mg  200 mg Oral Q4H PRN Selvin Bhagat MD   200 mg at 01/05/24 2258    heparin (porcine) injection 5,000 Units  5,000 Units Subcutaneous Q8H Chavo Alvarenga MD   5,000 Units at 01/17/24 1529    naloxone 0.4 mg/mL injection 0.02 mg  0.02 mg Intravenous PRN Chavo Alvarenga MD        pravastatin tablet 80 mg  80 mg Oral Daily Chavo Alvarenga MD   80 mg at 01/17/24 1110    sodium chloride 0.9% flush 10 mL  10 mL Intravenous Q12H PRN Chavo Alvarenga MD        spironolactone tablet 50 mg  50 mg Oral Daily Nova Malone MD   50 mg at 01/17/24 1109     Current Discharge Medication List        START taking these medications    Details   bumetanide (BUMEX) 2 MG tablet Take 1 tablet (2 mg total) by mouth 2 (two) times daily.  Qty: 60 tablet, Refills: 11    Comments: .      DOBUTamine (DOBUTREX) 1,000 mg/250 mL (4,000 mcg/mL) infusion Inject 266.75 mcg/min into the vein continuous.  Qty: 250 mL, Refills: 3      guaiFENesin 100 mg/5 ml (ROBITUSSIN) 100 mg/5 mL syrup Take 10 mLs (200 mg total) by mouth every 4 (four) hours as needed for Cough or Congestion.  Qty: 180 mL, Refills: 0      metOLazone (ZAROXOLYN) 5 MG tablet Take 1 tablet (5 mg total) by mouth daily as needed (Weight gain of greater than 3 lbs in one day or 5 lbs in 3 days).  Qty: 30 tablet, Refills: 11    Comments: .      senna-docusate 8.6-50 mg (PERICOLACE) 8.6-50 mg per tablet Take 1 tablet by mouth daily as needed for Constipation.  Qty: 30 tablet, Refills: 3           CONTINUE these medications which have CHANGED    Details   spironolactone (ALDACTONE) 25 MG tablet Take 1 tablet (25 mg total) by mouth once daily. HOLD IF SYSTOLIC BLOOD PRESSURE IS LESS THAN 110  Qty: 45 tablet, Refills: 5    Comments: .  Associated Diagnoses: Essential  hypertension           CONTINUE these medications which have NOT CHANGED    Details   amiodarone (PACERONE) 200 MG Tab Take 1 tablet (200 mg total) by mouth once daily.  Qty: 90 tablet, Refills: 3      metoprolol succinate (TOPROL-XL) 50 MG 24 hr tablet TAKE 1 TABLET EVERY DAY  Qty: 90 tablet, Refills: 3    Associated Diagnoses: Essential hypertension      pravastatin (PRAVACHOL) 80 MG tablet TAKE 1 TABLET EVERY DAY  Qty: 90 tablet, Refills: 1    Associated Diagnoses: Other hyperlipidemia      aspirin (ECOTRIN) 325 MG EC tablet Take 1 tablet (325 mg total) by mouth once daily.  Qty: 90 tablet, Refills: 3    Associated Diagnoses: Chronic combined systolic and diastolic congestive heart failure      empagliflozin (JARDIANCE) 10 mg tablet Take 1 tablet (10 mg total) by mouth once daily.  Qty: 90 tablet, Refills: 3    Associated Diagnoses: NICM (nonischemic cardiomyopathy); HFrEF (heart failure with reduced ejection fraction)           STOP taking these medications       furosemide (LASIX) 80 MG tablet Comments:   Reason for Stopping:         potassium chloride (K-TAB) 20 mEq Comments:   Reason for Stopping:                 I have seen and examined this patient within the last 30 days. My clinical findings that support the need for the home health skilled services and home bound status are the following:no   Weakness/numbness causing balance and gait disturbance due to Heart Failure and Weakness/Debility making it taxing to leave home.  Requiring assistive device to leave home due to unsteady gait caused by  Heart Failure and Weakness/Debility.     Diet:   cardiac diet    Labs:  None    Referrals/ Consults  Physical Therapy to evaluate and treat. Evaluate for home safety and equipment needs; Establish/upgrade home exercise program. Perform / instruct on therapeutic exercises, gait training, transfer training, and Range of Motion.  Occupational Therapy to evaluate and treat. Evaluate home environment for safety and  equipment needs. Perform/Instruct on transfers, ADL training, ROM, and therapeutic exercises.    Activities:   activity as tolerated    Nursing:   Agency to admit patient within 24 hours of hospital discharge unless specified on physician order or at patient request    SN to complete comprehensive assessment including routine vital signs. Instruct on disease process and s/s of complications to report to MD. Review/verify medication list sent home with the patient at time of discharge  and instruct patient/caregiver as needed. Frequency may be adjusted depending on start of care date.     Skilled nurse to perform up to 3 visits PRN for symptoms related to diagnosis    Notify MD if SBP > 160 or < 90; DBP > 90 or < 50; HR > 120 or < 50; Temp > 101; O2 < 88%; Other:   None    Ok to schedule additional visits based on staff availability and patient request on consecutive days within the home health episode.    When multiple disciplines ordered:    Start of Care occurs on Sunday - Wednesday schedule remaining discipline evaluations as ordered on separate consecutive days following the start of care.    Thursday SOC -schedule subsequent evaluations Friday and Monday the following week.     Friday - Saturday SOC - schedule subsequent discipline evaluations on consecutive days starting Monday of the following week.    For all post-discharge communication and subsequent orders please contact patient's primary care physician. If unable to reach primary care physician or do not receive response within 30 minutes, please contact Emergency Physician for clinical staff order clarification    Miscellaneous   Home Infusion Therapy:   SN to perform Infusion Therapy/Central Line Care.  Review Central Line Care & Central Line Flush with patient.    Administer (drug and dose): Dobutamine 2.5mcg/kg/min    Last dose given: 2.5mcg/kg/min                         Home dose due: Daily    Scrub the Hub: Prior to accessing the line, always  perform a 30 second alcohol scrub  Each lumen of the central line is to be flushed at least daily with 10 mL Normal Saline and 3 mL Heparin flush (10 units/mL)  Skilled Nurse (SN) may draw blood from IV access  Blood Draw Procedure:   - Aspirate at least 5 mL of blood   - Discard   - Obtain specimen   - Change injection cap   - Flush with 20 mL Normal Saline followed by a                 3-5 mL Heparin flush (10 units/mL)  Central :   - Sterile dressing changes are done weekly and as needed.   - Use chlor-hexadine scrub to cleanse site, apply Biopatch to insertion site,       apply securement device dressing   - Injection caps are changed weekly and after EVERY lab draw.   - If sterile gauze is under dressing to control oozing,                 dressing change must be performed every 24 hours until gauze is not needed.  Heart Failure:      SN to instruct on the following:    Instruct on the definition of CHF.   Instruct on the signs/sympoms of CHF to be reported.   Instruct on and monitor daily weights.   Instruct on factors that cause exacerbation.   Instruct on action, dose, schedule, and side effects of medications.   Instruct on diet as prescribed.   Instruct on activity allowed.   Instruct on life-style modifications for life long management of CHF   SN to assess compliance with daily weights, diet, medications, fluid retention,    safety precautions, activities permitted and life-style modifications.   Additional 1-2 SN visits per week as needed for signs and symptoms     of CHF exacerbation.      For Weight Gain > 2-3 lbs in 1 day or 4-6 lbs over 1 week notify PCP:  CHF DIURETIC SLIDING SCALE     Skilled nurse visits daily to instruct and monitor medication adherence until target weight. Then resume prior order frequency.     If weight gain exceeds 5 lbs over target weight, call MD  If weight gain of 3-4 lbs over target weight, then:     Increase Bumetanide - Current dose (mg/day) to 4mg in  morning, 2mg in afternoon  double dose  and frequency of daily until target weight achieved or maximum 3 days.     If already on max oral daily dose, see IV diuretic instructions.    After 3 days of increased oral diuretic dose, get BMP. If patient is on increased oral diuretic dose greater than 5 days, repeat BMP at day 7 of increased dose.     Potassium supplementation   Scr > 1.5 mg/dl  Scr  1.5 mg/dl    K < 3.0 - NOTIFY MD and 40 mEq bid  40 mEq tid   K- 3.1-3.3  20 mEq bid  20 mEq tid    K 3.4-3.7  10 mEq bid  10 mEq tid      If target weight not reached after 5 days of increased oral diuretic, proceed to IV diuretic with daily patient contact to include face-to-face visit and telephone encounters.       Home Health Aide:  Nursing Daily, Physical Therapy Three times weekly, and Occupational Therapy Three times weekly    Wound Care Orders  no    I certify that this patient is confined to his home and needs intermittent skilled nursing care, physical therapy, and occupational therapy.

## 2024-01-17 NOTE — PLAN OF CARE
Problem: Adult Inpatient Plan of Care  Goal: Plan of Care Review  Outcome: Ongoing, Progressing  Goal: Patient-Specific Goal (Individualized)  Outcome: Ongoing, Progressing  Goal: Absence of Hospital-Acquired Illness or Injury  Outcome: Ongoing, Progressing  Goal: Optimal Comfort and Wellbeing  Outcome: Ongoing, Progressing  Goal: Readiness for Transition of Care  Outcome: Ongoing, Progressing     Problem: Fluid and Electrolyte Imbalance (Acute Kidney Injury/Impairment)  Goal: Fluid and Electrolyte Balance  Outcome: Ongoing, Progressing     Problem: Oral Intake Inadequate (Acute Kidney Injury/Impairment)  Goal: Optimal Nutrition Intake  Outcome: Ongoing, Progressing     Problem: Renal Function Impairment (Acute Kidney Injury/Impairment)  Goal: Effective Renal Function  Outcome: Ongoing, Progressing     Problem: Impaired Wound Healing  Goal: Optimal Wound Healing  Outcome: Ongoing, Progressing     Problem: Adjustment to Illness (Heart Failure)  Goal: Optimal Coping  Outcome: Ongoing, Progressing     Problem: Cardiac Output Decreased (Heart Failure)  Goal: Optimal Cardiac Output  Outcome: Ongoing, Progressing     Problem: Dysrhythmia (Heart Failure)  Goal: Stable Heart Rate and Rhythm  Outcome: Ongoing, Progressing     Problem: Fluid Imbalance (Heart Failure)  Goal: Fluid Balance  Outcome: Ongoing, Progressing     Problem: Functional Ability Impaired (Heart Failure)  Goal: Optimal Functional Ability  Outcome: Ongoing, Progressing     Problem: Oral Intake Inadequate (Heart Failure)  Goal: Optimal Nutrition Intake  Outcome: Ongoing, Progressing     Problem: Respiratory Compromise (Heart Failure)  Goal: Effective Oxygenation and Ventilation  Outcome: Ongoing, Progressing     Problem: Sleep Disordered Breathing (Heart Failure)  Goal: Effective Breathing Pattern During Sleep  Outcome: Ongoing, Progressing     Problem: Skin Injury Risk Increased  Goal: Skin Health and Integrity  Outcome: Ongoing, Progressing     Problem:  Infection  Goal: Absence of Infection Signs and Symptoms  Outcome: Ongoing, Progressing     Problem: Coping Ineffective  Goal: Effective Coping  Outcome: Ongoing, Progressing

## 2024-01-17 NOTE — PLAN OF CARE
01/17/24 1254   Post-Acute Status   Post-Acute Authorization Home Health;IV Infusion   Home Health Status Referrals Sent   IV Infusion Status Referral(s) sent     Met with patient to review discharge recommendation of HH and IV Home infusion and is agreeable to plan    Patient/family provided list of facilities in-network with patient's payor plan. Providers that are owned, operated, or affiliated with [facility] are included on the list.     Notified that referral sent to below listed facilities from in-network list based on proximity to home/family support:   1.[facility]   2.[facility]   3.[facility]  4.  5. (can send more than 5)    Patient/family instructed to identify preference.    Preferred Facility: (if more than 1, listed in order of descending preference)  No preference    If an additional preferred facility not listed above is identified, additional referral to be sent. If above facilities unable to accept, will send additional referrals to in-network providers.      Also sent referral to [facility].     1:01 [facility] and [facility] cannot accept due to out of service area.     1:56 [facility] unable to accept because they do not do dobutamine patients. Other referrals sent out to :    1. [facility]      2. [facility]      3. [facility]      4. [facility]     5.[facility]     2:43 per care port , [facility].                                  Nydia Mack  Received a call from F[facility]zia [phone] and she stated not able to accept the patient at this time due to short staffing and no none to go in his area.                                    [facility] - Not able to see patient at this time I asked that the patient see me for an immediate exam if there is another episode of this problem. Slot is not until the 23rd. (Spoke with Heather [phone]).                                     [facility] - stated out of network with [employer] Plan.                                      "[facility] Unfortunately we are at quota at this time .     4:07 received a call from the [facility] earlier (spoke with Nadine at [phone])and they requested HH Orders to see if they are able to accept patient . Physician notified per chat and I did receive orders and added to care port and sent to the medical team.     4:08 Received a call from [facility] - spoke with Sheyla at [phone] ans she stated ," Patient extremely too high risk for them  to take care of him and he is more suitable for hospice. She stated to call if any concerns.       Epifanio Licona RN    [phone]    "

## 2024-01-17 NOTE — NURSING
Summons to patient's room voiced complaints that he had been sitting here waiting on someone to come in. Pca and writer in room to provide incontinent care and patient voiced that he knew we had something to hide and that something was going on. Attempt to redirect patient and observed that patient was having some intermittent confusion at this time. Patient was cleaned up and left resting in bed.

## 2024-01-17 NOTE — ASSESSMENT & PLAN NOTE
Patient with chronic lower leg wounds that recently established with wound care. He is set to follow up with vascular surgery outpatient at the end of February.    Appreciate wound care recommendations  Continue diuresis medications

## 2024-01-17 NOTE — ANESTHESIA PREPROCEDURE EVALUATION
**PAPER CONSENT IN CHART**    [facility]  Anesthesia Pre-Operative Evaluation    Patient Name: Papito Bhakta  YOB: 1955  MRN: [id number]    SUBJECTIVE:     Pre-operative evaluation for Procedure(s) (LRB):  EGD (ESOPHAGOGASTRODUODENOSCOPY) (N/A)    01/17/2024    Papito Bhakta is a 68 y.o. male with nonischemic cardiomyopathy EF 10-15% with grade 3 diastolic dysfunction on TTE 11/2023, s/p [facility] CRT-D device placement, complicated by device infection 6/2020 status post removal and reimplantation 9/2020, mild-to-moderate mitral valve regurgitation, hypertension, hyperlipidemia, AMELIA, peripheral vascular disease, obesity, chronic lower extremity swelling with chronic recurring leg wounds presented to ED with CHF exacerbation. Hospital course complicated by afib with rvr and started on amio gtt. Also complains of dysphagia so GI evaluated and will take for repeat EGD to evaluate cause of dysphagia.    Patient now presents for the above procedure(s).    TTE 2023    Left Ventricle: The left ventricle is severely dilated. Mildly increased wall thickness. There is mild concentric hypertrophy. Severe global hypokinesis present. There is severely reduced systolic function with a visually estimated ejection fraction of 10 -15%. Grade III diastolic dysfunction.    Right Ventricle: Severe right ventricular enlargement. Systolic function is severely reduced.    Mitral Valve: There is no stenosis. There is mild to moderate regurgitation with a centrally directed jet.    Tricuspid Valve: There is mild to moderate regurgitation.    Pulmonary Artery: The estimated pulmonary artery systolic pressure is 67 mmHg.      LDA: None documented.       Peripheral IV - Single Lumen 12/31/23 1752 20 G Left;Posterior Forearm (Active)   Site Assessment Clean;Dry;Intact;No redness;No swelling 01/04/24 0400   Extremity Assessment Distal to IV No abnormal discoloration;No redness;No swelling;No warmth 01/03/24 1920    Line Status Saline locked 01/04/24 0400   Dressing Status Clean;Dry;Intact 01/03/24 1920   Dressing Intervention Integrity maintained 01/04/24 0400   Dressing Change Due 01/04/24 01/03/24 1800   Site Change Due 01/04/24 01/03/24 0800   Reason Not Rotated Not due 01/03/24 1800   Number of days: 3       Prev airway: None documented.    Drips: None documented.   DOBUTamine IV infusion (non-titrating) 2.5 mcg/kg/min (01/16/24 7611)       Patient Active Problem List   Diagnosis    Essential hypertension    Chronic combined systolic and diastolic congestive heart failure    Acute hypoxemic respiratory failure    Hyperlipidemia    Biventricular ICD (implantable cardioverter-defibrillator) in place    Chronic left shoulder pain    Decreased range of motion of left shoulder    Congestive heart failure    Erosion of pacemaker pocket due to and not concurrent with implantation of cardiac pacemaker    NICM (nonischemic cardiomyopathy)    Thrombocytopenia, unspecified    Peripheral vascular disease, unspecified    Pulmonary nodule    Advance care planning    Emphysema lung    AMELIA (obstructive sleep apnea)    Ventricular tachycardia    Class 1 obesity due to excess calories with serious comorbidity in adult    Acute renal failure superimposed on stage 3a chronic kidney disease    Acute on chronic combined systolic and diastolic CHF (congestive heart failure)    Elevated INR    Elevated LFTs    PVD (peripheral vascular disease)    Serum total bilirubin elevated    Pupil asymmetry    Dysphagia    Microcytic anemia    COVID-19    CKD (chronic kidney disease)    Encounter for palliative care    ACP (advance care planning)       Review of patient's allergies indicates:   Allergen Reactions    Ramipril      Leg swelling and gynecomastia     Losartan Rash       Current Inpatient Medications:   amiodarone  200 mg Oral Daily    aspirin  325 mg Oral Daily    bumetanide  2 mg Oral BID loop    duke's soln (benadryl 30 mL, mylanta 30 mL,  LIDOcaine 30 mL, nystatin 30 mL) 120 mL  10 mL Oral QID    empagliflozin  10 mg Oral Daily    heparin (porcine)  5,000 Units Subcutaneous Q8H    pravastatin  80 mg Oral Daily    spironolactone  50 mg Oral Daily       Antibiotics (From admission, onward)      None            VTE Risk Mitigation (From admission, onward)           Ordered     heparin (porcine) injection 5,000 Units  Every 8 hours         12/29/23 1759     IP VTE HIGH RISK PATIENT  Once         12/29/23 1759     Place sequential compression device  Until discontinued         12/29/23 1759                    No current facility-administered medications on file prior to encounter.     Current Outpatient Medications on File Prior to Encounter   Medication Sig Dispense Refill    amiodarone (PACERONE) 200 MG Tab Take 1 tablet (200 mg total) by mouth once daily. 90 tablet 3    metoprolol succinate (TOPROL-XL) 50 MG 24 hr tablet TAKE 1 TABLET EVERY DAY 90 tablet 3    pravastatin (PRAVACHOL) 80 MG tablet TAKE 1 TABLET EVERY DAY 90 tablet 1    aspirin (ECOTRIN) 325 MG EC tablet Take 1 tablet (325 mg total) by mouth once daily. 90 tablet 3    empagliflozin (JARDIANCE) 10 mg tablet Take 1 tablet (10 mg total) by mouth once daily. 90 tablet 3    [DISCONTINUED] ramipril (ALTACE) 10 MG capsule Take 1 capsule (10 mg total) by mouth once daily. 90 capsule 3       Past Surgical History:   Procedure Laterality Date    ESOPHAGOGASTRODUODENOSCOPY N/A 1/3/2024    Procedure: EGD (ESOPHAGOGASTRODUODENOSCOPY);  Surgeon: Vaughn Oliver MD;  Location: [address]);  Service: Endoscopy;  Laterality: N/A;    ESOPHAGOGASTRODUODENOSCOPY N/A 1/5/2024    Procedure: EGD (ESOPHAGOGASTRODUODENOSCOPY);  Surgeon: Faith Juares MD;  Location: [facility] ([address] [place] [address]);  Service: Endoscopy;  Laterality: N/A;    INSERTION OF BIVENTRICULAR IMPLANTABLE CARDIOVERTER-DEFIBRILLATOR (ICD) N/A 02/13/2019    Procedure: INSERTION, ICD, BIVENTRICULAR;  Surgeon: Shailesh Eng MD;  Location:  [facility] CATH LAB;  Service: Cardiology;  Laterality: N/A;  RN PRE OP 2-6-19  1ST CASE PER  RADHA. NOTIFIED RADHA THAT ANESTHESIA IS NOT PITO FOR 1ST CASE START-LO    INSERTION OF BIVENTRICULAR IMPLANTABLE CARDIOVERTER-DEFIBRILLATOR (ICD) N/A 09/28/2020    Procedure: INSERTION, ICD, BIVENTRICULAR;  Surgeon: Jim Kwong MD;  Location: [facility] EP LAB;  Service: Cardiology;  Laterality: N/A;  NICM, CRT-D, SJM,, MAC, DM, 3 Prep*Wearing LifeVest*    oral extraction  11/2018    TESTICLE SURGERY         Social History     Socioeconomic History    Marital status:    Tobacco Use    Smoking status: Former     Current packs/day: 0.00     Average packs/day: 0.5 packs/day for 40.0 years (20.0 ttl pk-yrs)     Types: Cigarettes, Cigars     Start date: 1974     Quit date: 2014     Years since quitting: 10.0     Passive exposure: Current    Smokeless tobacco: Never   Substance and Sexual Activity    Alcohol use: Yes     Comment: once a month beer or liquor    Drug use: No    Sexual activity: Not Currently     Birth control/protection: None     Comment: uses protection sometimes     Social Determinants of Health     Financial Resource Strain: Low Risk  (11/29/2023)    Overall Financial Resource Strain (CARDIA)     Difficulty of Paying Living Expenses: Not hard at all   Food Insecurity: No Food Insecurity (11/29/2023)    Hunger Vital Sign     Worried About Running Out of Food in the Last Year: Never true     Ran Out of Food in the Last Year: Never true   Transportation Needs: No Transportation Needs (11/29/2023)    PRAPARE - Transportation     Lack of Transportation (Medical): No     Lack of Transportation (Non-Medical): No   Physical Activity: Unknown (11/29/2023)    Exercise Vital Sign     Days of Exercise per Week: Patient declined     Minutes of Exercise per Session: 0 min   Social Connections: Unknown (11/29/2023)    Social Connection and Isolation Panel [NHANES]     Frequency of Communication with Friends and Family: More  than three times a week     Frequency of Social Gatherings with Friends and Family: More than three times a week     Active Member of Clubs or Organizations: No     Attends Club or Organization Meetings: Never     Marital Status:    Housing Stability: Low Risk  (11/29/2023)    Housing Stability Vital Sign     Unable to Pay for Housing in the Last Year: No     Number of Places Lived in the Last Year: 1     Unstable Housing in the Last Year: No       OBJECTIVE:     Vital Signs Range (Last 24H):  Temp:  [36.4 °C (97.6 °F)-36.8 °C (98.2 °F)]   Pulse:  [71-84]   Resp:  [18-19]   BP: (91-98)/(54-65)   SpO2:  [94 %-99 %]       Significant Labs:  Lab Results   Component Value Date    WBC 8.09 01/17/2024    HGB 11.3 (L) 01/17/2024    HCT 35.3 (L) 01/17/2024    PLT 82 (L) 01/17/2024    CHOL 103 (L) 09/14/2023    TRIG 51 09/14/2023    HDL 35 (L) 09/14/2023    ALT 25 01/17/2024    AST 27 01/17/2024     01/17/2024    K 3.9 01/17/2024    CL 98 01/17/2024    CREATININE 1.3 01/17/2024    BUN 38 (H) 01/17/2024    CO2 30 (H) 01/17/2024    TSH 2.764 09/14/2023    INR 1.2 12/06/2023    HGBA1C 5.8 (H) 11/10/2023       Diagnostic Studies: No relevant studies.    EKG:   Results for orders placed or performed during the hospital encounter of 12/29/23   EKG 12-lead    Collection Time: 01/06/24 11:32 AM    Narrative    Test Reason : R06.82,    Vent. Rate : 080 BPM     Atrial Rate : 080 BPM     P-R Int : 260 ms          QRS Dur : 164 ms      QT Int : 424 ms       P-R-T Axes : 060 -42 127 degrees     QTc Int : 489 ms    Atrial-sensed ventricular-paced rhythm with prolonged AV conduction  Abnormal ECG  When compared with ECG of 31-DEC-2023 21:06,  No significant change was found  Confirmed by OMAIRA KHAN, HOMEYAR ([id number]) on 1/7/2024 9:14:11 AM    Referred By: EVELYN LYN           Confirmed By:JANETTE CASTANO MD       2D ECHO:  TTE:  Results for orders placed or performed during the hospital encounter of 11/10/23   Echo   Result  Value Ref Range    BSA 2.61 m2    LVOT stroke volume 48.05 cm3    LVIDd 7.84 (A) 3.5 - 6.0 cm    LV Systolic Volume 285.73 mL    LV Systolic Volume Index 111.6 mL/m2    LVIDs 7.36 (A) 2.1 - 4.0 cm    LV Diastolic Volume 329.37 mL    LV Diastolic Volume Index 128.66 mL/m2    IVS 1.38 (A) 0.6 - 1.1 cm    LVOT diameter 2.24 cm    LVOT area 3.9 cm2    FS 6 (A) 28 - 44 %    Left Ventricle Relative Wall Thickness 0.35 cm    Posterior Wall 1.39 (A) 0.6 - 1.1 cm    LV mass 593.40 g    LV Mass Index 232 g/m2    MV Peak E Nicola 1.55 m/s    TDI LATERAL 0.05 m/s    TDI SEPTAL 0.05 m/s    E/E' ratio 31.00 m/s    MV Peak A Nicola 0.51 m/s    TR Max Nicola 3.60 m/s    E/A ratio 3.04     E wave deceleration time 155.66 msec    LV SEPTAL E/E' RATIO 31.00 m/s    LV LATERAL E/E' RATIO 31.00 m/s    LVOT peak nicola 0.76 m/s    Left Ventricular Outflow Tract Mean Velocity 0.52 cm/s    Left Ventricular Outflow Tract Mean Gradient 1.25 mmHg    RVDD 6.42 cm    RV S' 8.67 cm/s    TAPSE 1.73 cm    LA size 5.60 cm    Left Atrium Minor Axis 8.97 cm    Left Atrium Major Axis 9.42 cm    RA Major Axis 7.17 cm    AV mean gradient 4 mmHg    AV peak gradient 7 mmHg    Ao peak nicola 1.35 m/s    Ao VTI 27.00 cm    LVOT peak VTI 12.20 cm    AV valve area 1.78 cm²    AV Velocity Ratio 0.56     AV index (prosthetic) 0.45     PITO by Velocity Ratio 2.22 cm²    MV stenosis pressure 1/2 time 45.14 ms    MV valve area p 1/2 method 4.87 cm2    Triscuspid Valve Regurgitation Peak Gradient 52 mmHg    PV PEAK VELOCITY 1.13 m/s    PV peak gradient 5 mmHg    Ao root annulus 3.69 cm    Sinus 3.59 cm    STJ 2.75 cm    Ascending aorta 3.04 cm    IVC diameter 3.10 cm    Mean e' 0.05 m/s    ZLVIDS -2.16     ZLVIDD -7.39     AORTIC VALVE CUSP SEPERATION 2.22 cm    LA Volume Index 119.6 mL/m2    LA volume 306.19 cm3    LA WIDTH 7.0 cm    RA Width 6.9 cm    TV resting pulmonary artery pressure 67 mmHg    RV TB RVSP 19 mmHg    Est. RA pres 15 mmHg    Narrative      Left Ventricle: The  left ventricle is severely dilated. Mildly   increased wall thickness. There is mild concentric hypertrophy. Severe   global hypokinesis present. There is severely reduced systolic function   with a visually estimated ejection fraction of 10 -15%. Grade III   diastolic dysfunction.    Right Ventricle: Severe right ventricular enlargement. Systolic   function is severely reduced.    Mitral Valve: There is no stenosis. There is mild to moderate   regurgitation with a centrally directed jet.    Tricuspid Valve: There is mild to moderate regurgitation.    Pulmonary Artery: The estimated pulmonary artery systolic pressure is   67 mmHg.         JASSON:  Results for orders placed or performed during the hospital encounter of 06/15/20   Transesophageal echo (JASSON)   Result Value Ref Range    BSA 2.81 m2    TV area PISA 0.41 cm2    PISA TR VN Nyquist 0.36 m/s    PISA TR Radius 0.8 cm    TR Max Nicola 3.50 m/s    Triscuspid Valve Regurgitation Peak Gradient 49 mmHg    Narrative    · Severely decreased left ventricular systolic function. The estimated   ejection fraction is 25%.  · Low normal right ventricular systolic function.  · Normal appearing left atrial appendage. No thrombus is present in the   appendage.  · RA and RV ICD leads visualized in the posterior SVC along the vessel   wall before extraction.  · S/p Successful device and lead extraction. No pericardial effusion   before or after procedure. No worsening TR post procedure. Normal SVC   anatomy post procedure. Wire seen in SVC post extraction is an amplatzer   wire.  · PISA radius 0.8 cm, EROA 41 mm2, RVOL 53mL, Systolic flow reversal in   Left superior pulmonary vein. Severe mitral regurgitation by PISA.  · Moderate tricuspid regurgitation.  · Mild to moderate pulmonic regurgitation.  · Pulmonary hypertension present.          ASSESSMENT/PLAN:           Pre-op Assessment    I have reviewed the Patient Summary Reports.     I have reviewed the Nursing Notes. I have  reviewed the NPO Status.   I have reviewed the Medications.     Review of Systems  Anesthesia Hx:             Denies Family Hx of Anesthesia complications.    Denies Personal Hx of Anesthesia complications.                    Cardiovascular:     Hypertension       CHF                                 Pulmonary:   COPD     Sleep Apnea                Renal/:  Chronic Renal Disease                    Physical Exam  General: Well nourished, Cooperative, Alert and Oriented    Airway:  Mallampati: I / I  Mouth Opening: Normal  TM Distance: Normal  Neck ROM: Normal ROM    Dental:        Anesthesia Plan  Type of Anesthesia, risks & benefits discussed:    Anesthesia Type: Gen Natural Airway, MAC  Intra-op Monitoring Plan: Standard ASA Monitors  Post Op Pain Control Plan: multimodal analgesia and IV/PO Opioids PRN  Induction:  IV  Airway Plan: Direct and Video  ASA Score: 3  Day of Surgery Review of History & Physical: H&P Update referred to the surgeon/provider.    Ready For Surgery From Anesthesia Perspective.     .

## 2024-01-17 NOTE — PROGRESS NOTES
[facility] - Cardiology [facility] Medicine  Progress Note    Patient Name: Papito Bhakta  MRN: [id number]  Patient Class: IP- Inpatient   Admission Date: 12/29/2023  Length of Stay: 15 days  Attending Physician: Cayetano Somers MD  Primary Care Provider: Brynn To MD        Subjective:     Principal Problem:Serum total bilirubin elevated        HPI:  Papito Bhakta is a 68 y.o. male with NICM, combined CHF(11/2023 EF 10 -15%, G3DD) s/p ICD placement, CKD, HLD, HTN, and AMELIA who presents for bilateral lower extremity swelling and shortness of breath. Patient reports he has been having worsening shortness of breath for the past 6 months. He had acutely worsening SOB today where he described becoming profoundly dyspneic on exertion. He reported taking 2 of his 80 mg Lasix this morning with subsequent minimal urine output and minimal improvement in his SOB. He reports SOB aggravated with lying down and he requires frequent positional changes to keep comfortable. He reports additional poor appetite and urine output for the past week although he reports he has been drinking well. He had 2 episodes of nausea/vomiting this past week as well. He denies fever/chills, chest pain, abdominal pain, recent illness, medication changes. Patient reports adherence with all medications. He reports he lives with his daughter who helps him with his medications and healthcare.    Additionally he reports chronic leg pain from a chronic RLE wound. He went to wound care yesterday where dressings were changed and he was told they were healing well. He has bilateral lower extremity edema R>L that is typical for him and he denies recent worsening.    Overview/Hospital Course:  Pt admitted to hospital medicine for acute heart failure exacerbation and inability to swallow solids for 3 months duration. Initiated on IV lasix. SLP evaluated the patient and determined that he could tolerate liquids. He had a new leukocytosis noted on  "12/31, work up significant for COVID positive. He completed remdesivir, steroids held in order to prevent worsening fluid retention, and was able to be weaned to room oxygen by 1/4. Overnight 12/31, patient becane tachycardic and hypotensive (asymptomatic). Cardiology called and concluded that he was in atrial fibrillation with RVR and he was briefly changed from oral amiodarone to IV amiodarone for one day before resuming his oral dosing. PM interrogated, noted to be functioning accurately. Cardiology signed off. IV lasix increased from pushes to gtt. Urine output did not significantly increase, bladder scan with 325ml urine. Mcmahon placed.     GI was consulted for his ongoing dysphagia. With his ongoing aggressive diuresis, COVID infection, and episode of hemodynamic stability, the EGD was deferred until he becomes more euvolemic.     Transient episode of increased respiratory distress 1/6. EKG without significant changes from prior. Troponin negative. CXR showed stability from prior imaging.    During his admission, noted to have chronic bilirubinemia, assumed likely secondary to congestive hepatopathy. RUQ US without biliary obstruction. Hemolysis labs negative. Tbili continued to uptrend despite improvement in fluid status and renal function. Also noted to have worsening thrombocytopenia, despite normal LFTs. Hepatology consulted, who agree with congestive hepatopathy. Further work up pending. He has chronic lower extremity leg wounds secondary to peripheral vascular disease. Wound care was consulted and assisted in managing the wounds during his admission. He has outpatient follow-up scheduled with vascular surgery at the end of February. Cardiology recommending aggressive diuresis, started him on a dobutamine gtt and continued lasix gtt.    1/10 expressing desire to stop further treatment and to discharge to home. States he wishes to live comfortably at home and "if it's my time it's my time". Palliative care " consulted. 1/11 expressing desire to continue treatment and change back to full code.    Tunneled line placed for dobutamine infusion. Diuresed with lasix gtt and transitioned to bumex 1/16/24. Planning for EGD tomorrow. Planning for discharge home with HH.    Interval History: Noted some confusion overnight, fully oriented this morning.. On 2L O2 NC. 2.56L UOP yesterday. T. Bilchrissie continues to downtrend. No complaints this morning. NPO at midnight for EGD tomorrow.    Objective:     Vital Signs (Most Recent):  Temp: 98.2 °F (36.8 °C) (01/17/24 0758)  Pulse: 79 (01/17/24 0758)  Resp: 18 (01/17/24 0758)  BP: 98/65 (01/17/24 0758)  SpO2: 98 % (01/17/24 0758) Vital Signs (24h Range):  Temp:  [97.6 °F (36.4 °C)-98.2 °F (36.8 °C)] 98.2 °F (36.8 °C)  Pulse:  [71-84] 79  Resp:  [17-19] 18  SpO2:  [94 %-100 %] 98 %  BP: (91-98)/(54-65) 98/65     Weight: 98.6 kg (217 lb 6 oz)  Body mass index is 25.78 kg/m².    Intake/Output Summary (Last 24 hours) at 1/17/2024 0805  Last data filed at 1/17/2024 0523  Gross per 24 hour   Intake 422 ml   Output 2360 ml   Net -1938 ml         Physical Exam  Vitals and nursing note reviewed.   Constitutional:       Appearance: He is ill-appearing.   HENT:      Head: Normocephalic and atraumatic.      Mouth/Throat:      Mouth: Mucous membranes are dry.   Eyes:      General: Scleral icterus present.      Extraocular Movements: Extraocular movements intact.      Comments: Anisocoria   Neck:      Vascular: JVD (5cm) present.   Cardiovascular:      Rate and Rhythm: Normal rate and regular rhythm.      Heart sounds:      Gallop present.   Pulmonary:      Effort: Pulmonary effort is normal. No respiratory distress.      Breath sounds: Normal breath sounds. No wheezing.   Abdominal:      General: Abdomen is flat. There is no distension.      Palpations: Abdomen is soft.      Tenderness: There is no abdominal tenderness.   Musculoskeletal:         General: Tenderness (b/l LE) present.      Right lower  "leg: Edema present.      Left lower leg: Edema present.   Skin:     General: Skin is warm and dry.   Neurological:      General: No focal deficit present.      Mental Status: He is alert and oriented to person, place, and time.      Motor: Weakness present.   Psychiatric:         Mood and Affect: Mood normal.         Behavior: Behavior normal.             Significant Labs: All pertinent labs within the past 24 hours have been reviewed.    Significant Imaging: I have reviewed all pertinent imaging results/findings within the past 24 hours.    Assessment/Plan:      * Serum total bilirubin elevated  Continues to increase. Direct bilirubinemia. No signs of hemolysis. Likely 2/2 to congestive hepatopathy. Improving.    - Daily CMPs  - Appears chronic since November. Previously reported as secondary to congestive hepatopathy however remaining LFTs likely WNL.   - US Liver with doppler showing: Bidirectional portal vein flow may be seen with right heart failure, tricuspid regurgitation, or portal hypertension. Evidence of volume overload with distended IVC and hepatic veins.  Pulsatile flow through the hepatic veins (also possibly suggesting tricuspid regurgitation). Suspected hepatic steatosis. No evidence of biliary obstruction.  Possible genetic condition      ACP (advance care planning)  1/10, patient stating desire to go home. He is tired of his prolonged hospital course and feeling defeated at the slow trajectory of his remaining course. States he wants to be comfortable at home, "if it's my time it's my time". We discussed benefits of staying, include improved quality of life if we are able to medically optimize  Palliative care consulted, greatly appreciate assistance. With this conversation, along with conversations with family, he has agreed to stay for now. Will continue to engage in GOC conversations.     DNR order placed per palliative  Need to assess utility of ICD    1/11, patient now stating that he would " like all interventions done if needed and does not want ICD turned off  Order placed to make patient Full Code       CKD (chronic kidney disease)  Creatine stable for now. BMP reviewed- noted Estimated Creatinine Clearance: 68.5 mL/min (based on SCr of 1.3 mg/dL). according to latest data. Based on current GFR, CKD stage is stage 3 - GFR 30-59.  Monitor UOP and serial BMP and adjust therapy as needed. Renally dose meds. Avoid nephrotoxic medications and procedures.    COVID-19  COVID positive, noted 12/31. No residual symptoms. No longer on contact precautions.    - Finished remdesivir course 01/02/24    Microcytic anemia  Iron studies notable for Fe 26 and sat iron 8%. TIBC, ferritin, transferrin wnl. Likely iron deficiency anemia.    - Daily CBCs  - Feraheme given 1/4/24    Dysphagia  Reports a 3 month history of inability to tolerate solid foods. Notable vomiting immediatly with swallowing food/liquid. No complaints of nausea. SLP has assessed him, can tolerate liquids.     - Planning for EGD tomorrow, NPO @ midnight  - Continue liquid diet, Boost TID  - GI cancelled EGD and signing off. Will re-consult once patient is more euvolemic and hemodynamically stable.       Pupil asymmetry  Notable asymetry on pupils by patient. Chart review shows left orbital trauma in 2021 with notes concerned for dilation and vision changes. When talking to daughter, this is chronic. States no vision changes or neuro symptoms whatsoever.     Acute on chronic combined systolic and diastolic CHF (congestive heart failure)  Patient is identified as having Combined Systolic and Diastolic heart failure that is Acute on chronic. CHF is currently uncontrolled due to Pulmonary edema/pleural effusion on CXR. Latest ECHO performed and demonstrates- Results for orders placed during the hospital encounter of 11/10/23    Echo    Interpretation Summary    Left Ventricle: The left ventricle is severely dilated. Mildly increased wall thickness.  "There is mild concentric hypertrophy. Severe global hypokinesis present. There is severely reduced systolic function with a visually estimated ejection fraction of 10 -15%. Grade III diastolic dysfunction.    Right Ventricle: Severe right ventricular enlargement. Systolic function is severely reduced.    Mitral Valve: There is no stenosis. There is mild to moderate regurgitation with a centrally directed jet.    Tricuspid Valve: There is mild to moderate regurgitation.    Pulmonary Artery: The estimated pulmonary artery systolic pressure is 67 mmHg.  .Last BNP reviewed- and noted below   No results for input(s): "BNP", "BNPTRIAGEBLO" in the last 168 hours.      Presented for acute on chronic SOB with associated low UOP. Afebrile HDS. BNP 3,067, Troponin 0.031. CXR with cardiomegaly, vascular congestion, and edema. Received 100 of Lasix in the ED.    - Lasix and dobutamine gtt --> transitioned off lasix gtt on 1/16 to Bumex 2 mg BID  - RIP positive for Covid, treatment completed.  - continue home Jardiance   - Spironolactone at 25mg qd, increased to 50 QD  - Restart BB as tolerated  - Documented allergy to ACE/ARBs  - Cardiac diet with Fluid restriction at 1.5L with strict I/Os and daily STANDING weights  - Check Electrolytes, keep Mag >2 & K+ >4  - SCDs, Ambulate as tolerated    - Strict I&Os, Daily standing weights  - Review Low-salt diet and enforce medication adherence on discharge    Acute renal failure superimposed on stage 3a chronic kidney disease  Creatinine 2.1 on admit, baseline around 1.4. Likely prerenal etiology given urine sodium and FENa vs progression of CKD. Improving with increased diuresis     Recent Labs     01/16/24  0350 01/16/24  1547 01/17/24  0636   BUN 41* 41* 38*   CREATININE 1.3 1.3 1.3         Estimated Creatinine Clearance: 68.5 mL/min (based on SCr of 1.3 mg/dL).  RESOLVED    - Renal US: renal cysts, no hydronephrosis  - Urine Na: 14, Pr/Cr: 0.72; FENa 0.2%  - Monitor urine output and " serial BMP and adjust therapy as needed.   - Strict I&Os and daily weights   - Avoid nephrotoxic agents such as NSAIDs, gadolinium and IV radiocontrast.  - Renally dose meds to current GFR.  - Maintain MAP > 65.  - Nephrology referral outpatient    AMELIA (obstructive sleep apnea)  Carried diagnosis of AMELIA per chart, however he does not sleep with CPAP at home and declines while here      Peripheral vascular disease, unspecified  Patient with chronic lower leg wounds that recently established with wound care. He is set to follow up with vascular surgery outpatient at the end of February.    Appreciate wound care recommendations  Continue diuresis medications      NICM (nonischemic cardiomyopathy)  ASA 325mg qd per home medication list. Reason for this dose unclear.      Biventricular ICD (implantable cardioverter-defibrillator) in place  Stable on admission, no acute issues, he denies discharge. QTc chronically prolonged. Continue home amiodarone for NSVT prevention. Monitor on telemetry     - 12/31, noted to have AF RVR with hypotension. Started on amio gtt. Per report, pacemaker malfunctioning.  - Device interrogated, no complications. EP has signed off and pt is back on home amio    Hyperlipidemia  Stable. Continue Home Pravastatin.        Acute hypoxemic respiratory failure  Patient with Hypoxic Respiratory failure which is Acute.  he is not on home oxygen. Supplemental oxygen was provided and noted-      Patient with persistent O2 requirement of 2-3 LPM. Largest contribution likely due to fluid overload in setting of heart failure exacerbation and possible urinary retention. Also found to be COVID positive, s/p course of remdesivir. With his persistent hypoxia, possible that he is developing a post viral pneumonia, or an aspiration pneumonia in setting of dysphagia.   Troponin and repeat EKG WNL, concern for ACS low.   He does have baseline arrhythmia with PM in place, recently interrogated. No AS on recent  TTE.    - Stopped lasix gtt, on bumex and  gtt  - Tunneled line placed 1/12/24 for dobutamine infusion  - Adding GDMT as tolerated  - Wean O2 as tolerated  - BNP repeated and still elevated at 2503.  - Consulting cardiology for failure to improve despite diuresis  - PT/OT consulted and following    Essential hypertension  Chronic, controlled. Latest blood pressure and vitals reviewed-     Temp:  [97.6 °F (36.4 °C)-98.2 °F (36.8 °C)]   Pulse:  [71-86]   Resp:  [16-19]   BP: (91-98)/(54-65)   SpO2:  [94 %-99 %] .   Home meds for hypertension were reviewed and noted below.   Hypertension Medications               furosemide (LASIX) 80 MG tablet TAKE 1 TABLET EVERY DAY    metoprolol succinate (TOPROL-XL) 50 MG 24 hr tablet TAKE 1 TABLET EVERY DAY    spironolactone (ALDACTONE) 25 MG tablet TAKE 1/2 TABLET (12.5 MG TOTAL) ONCE DAILY. HOLD IF SYSTOLIC BLOOD PRESSURE IS LESS THAN 110            While in the hospital, will manage blood pressure as follows; Adjust home antihypertensive regimen as follows- Holding in setting of borderline pressures in setting of new diuresis     Will utilize p.r.n. blood pressure medication only if patient's blood pressure greater than 180/110 and he develops symptoms such as worsening chest pain or shortness of breath.      VTE Risk Mitigation (From admission, onward)           Ordered     heparin (porcine) injection 5,000 Units  Every 8 hours         12/29/23 1759     IP VTE HIGH RISK PATIENT  Once         12/29/23 1759     Place sequential compression device  Until discontinued         12/29/23 1759                    Discharge Planning   MARIIA: 1/17/2024     Code Status: Full Code   Is the patient medically ready for discharge?: No    Reason for patient still in hospital (select all that apply): Treatment and Consult recommendations  Discharge Plan A: Home with family, Home Health                  Deepa Holly MD  Department of Hospital Medicine   [facility] - Cardiology Stepdown

## 2024-01-17 NOTE — ASSESSMENT & PLAN NOTE
"Patient is identified as having Combined Systolic and Diastolic heart failure that is Acute on chronic. CHF is currently uncontrolled due to Pulmonary edema/pleural effusion on CXR. Latest ECHO performed and demonstrates- Results for orders placed during the hospital encounter of 11/10/23    Echo    Interpretation Summary    Left Ventricle: The left ventricle is severely dilated. Mildly increased wall thickness. There is mild concentric hypertrophy. Severe global hypokinesis present. There is severely reduced systolic function with a visually estimated ejection fraction of 10 -15%. Grade III diastolic dysfunction.    Right Ventricle: Severe right ventricular enlargement. Systolic function is severely reduced.    Mitral Valve: There is no stenosis. There is mild to moderate regurgitation with a centrally directed jet.    Tricuspid Valve: There is mild to moderate regurgitation.    Pulmonary Artery: The estimated pulmonary artery systolic pressure is 67 mmHg.  .Last BNP reviewed- and noted below   No results for input(s): "BNP", "BNPTRIAGEBLO" in the last 168 hours.      Presented for acute on chronic SOB with associated low UOP. Afebrile HDS. BNP 3,067, Troponin 0.031. CXR with cardiomegaly, vascular congestion, and edema. Received 100 of Lasix in the ED.    - Lasix and dobutamine gtt --> transitioned off lasix gtt on 1/16 to Bumex 2 mg BID  - RIP positive for Covid, treatment completed.  - continue home Jardiance   - Spironolactone at 25mg qd, increased to 50 QD  - Restart BB as tolerated  - Documented allergy to ACE/ARBs  - Cardiac diet with Fluid restriction at 1.5L with strict I/Os and daily STANDING weights  - Check Electrolytes, keep Mag >2 & K+ >4  - SCDs, Ambulate as tolerated    - Strict I&Os, Daily standing weights  - Review Low-salt diet and enforce medication adherence on discharge  "

## 2024-01-17 NOTE — ASSESSMENT & PLAN NOTE
Chronic, controlled. Latest blood pressure and vitals reviewed-     Temp:  [97.6 °F (36.4 °C)-98.2 °F (36.8 °C)]   Pulse:  [71-86]   Resp:  [16-19]   BP: (91-98)/(54-65)   SpO2:  [94 %-99 %] .   Home meds for hypertension were reviewed and noted below.   Hypertension Medications               furosemide (LASIX) 80 MG tablet TAKE 1 TABLET EVERY DAY    metoprolol succinate (TOPROL-XL) 50 MG 24 hr tablet TAKE 1 TABLET EVERY DAY    spironolactone (ALDACTONE) 25 MG tablet TAKE 1/2 TABLET (12.5 MG TOTAL) ONCE DAILY. HOLD IF SYSTOLIC BLOOD PRESSURE IS LESS THAN 110            While in the hospital, will manage blood pressure as follows; Adjust home antihypertensive regimen as follows- Holding in setting of borderline pressures in setting of new diuresis     Will utilize p.r.n. blood pressure medication only if patient's blood pressure greater than 180/110 and he develops symptoms such as worsening chest pain or shortness of breath.

## 2024-01-17 NOTE — ASSESSMENT & PLAN NOTE
Continues to increase. Direct bilirubinemia. No signs of hemolysis. Likely 2/2 to congestive hepatopathy. Improving.    - Daily CMPs  - Appears chronic since November. Previously reported as secondary to congestive hepatopathy however remaining LFTs likely WNL.   - US Liver with doppler showing: Bidirectional portal vein flow may be seen with right heart failure, tricuspid regurgitation, or portal hypertension. Evidence of volume overload with distended IVC and hepatic veins.  Pulsatile flow through the hepatic veins (also possibly suggesting tricuspid regurgitation). Suspected hepatic steatosis. No evidence of biliary obstruction.  Possible genetic condition

## 2024-01-17 NOTE — ASSESSMENT & PLAN NOTE
COVID positive, noted 12/31. No residual symptoms. No longer on contact precautions.    - Finished remdesivir course 01/02/24   Home

## 2024-01-17 NOTE — PT/OT/SLP PROGRESS
Occupational Therapy   Co-Treatment  Co-treatment performed due to patient's multiple deficits requiring two skilled therapists to appropriately and safely assess patient's strength and endurance while facilitating functional tasks in addition to accommodating for patient's activity tolerance.      Name: Papito Bhakta  MRN: [id number]  Admitting Diagnosis:  Serum total bilirubin elevated  14 Days Post-Op    Recommendations:     Discharge Recommendations: Moderate Intensity Therapy  Discharge Equipment Recommendations:  bedside commode, wheelchair  Barriers to discharge:  None    Assessment:     Papito Bhakta is a 68 y.o. male with a medical diagnosis of Serum total bilirubin elevated.  He presents with the following performance deficits affecting function are weakness, impaired endurance, impaired self care skills, impaired functional mobility, gait instability, impaired balance, decreased lower extremity function, decreased safety awareness, pain, impaired skin. Pt demonstrated poor safety awareness during functional ambulation with verbal cues required for standing posture and AD management. Pt remains limited in ADLs, functional mobility, and functional transfers and is currently not performing tasks at OF. Pt would continue to benefit from skilled OT services to maximize functional independence with ADLs and functional mobility, reduce caregiver burden, and facilitate safe discharge in the least restrictive environment. Patient continues to demonstrate the need for moderate intensity therapy on a daily basis post acute exhibited by decreased independence with self-care and functional mobility     Rehab Prognosis:  Good; patient would benefit from acute skilled OT services to address these deficits and reach maximum level of function.       Plan:     Patient to be seen 3 x/week to address the above listed problems via self-care/home management, therapeutic activities, therapeutic exercises, neuromuscular  "re-education  Plan of Care Expires: 02/07/24  Plan of Care Reviewed with: patient    Subjective     Chief Complaint: weakness  Patient/Family Comments/goals: to stretch out his legs  Pain/Comfort:  Pain Rating 1: 0/10  Pain Rating Post-Intervention 1: 0/10    Objective:     Communicated with: RN prior to session.  Patient found HOB elevated with peripheral IV, telemetry, PureWick, bed alarm upon OT entry to room.    General Precautions: Standard, fall    Orthopedic Precautions:N/A  Braces: N/A  Respiratory Status: Room air     Occupational Performance:     Bed Mobility:    Patient completed Supine to Sit with stand by assistance     Functional Mobility/Transfers:  Patient completed Sit <> Stand Transfer with minimum assistance  with  rolling walker   Functional Mobility: Pt engaged in functional mobility to simulate household/community distances 12 ft with CGA and RW in order to maximize functional endurance and standing balance required for engagement in occupations of choice   Pt requested and tolerated static standing for ~5 minutes to "stretch out his legs" with CGA and RW  Initially observed with forward flexed posture with B forearms resting on RW and intermittent leaning on wall  Decreased safety awareness with verbal cues for upright posture and AD management    Activities of Daily Living:  Upper Body Dressing: minimum assistance to don hospital gown EOB due to IV line  Lower Body Dressing: total assistance required to don b/l socks EOB      [facility] 6 Click ADL: 16    Treatment & Education:  -Education on energy conservation and task modification to maximize safety and (I) during ADLs and mobility  -Education on importance of OOB activity to improve overall activity tolerance and promote recovery  -Pt educated to call for assistance and to transfer with hospital staff only  -Provided education regarding role of OT, POC, & discharge recommendations with pt verbalizing understanding.  Pt had no further questions " & when asked whether there were any concerns pt reported none.     Patient left up in chair with all lines intact, call button in reach, and RN notified    GOALS:   Multidisciplinary Problems       Occupational Therapy Goals          Problem: Occupational Therapy    Goal Priority Disciplines Outcome Interventions   Occupational Therapy Goal     OT, PT/OT Ongoing, Progressing    Description: Goals to be met by: 2/7/24 (1 month)      Patient will increase functional independence with ADLs by performing:    UE Dressing with Supervision.  LE Dressing with Supervision.  Grooming while standing at sink with Modified [place].  Toileting from toilet with Modified [place] for hygiene and clothing management.   Rolling to Bilateral with [place].   Supine to sit with [place].  Step transfer with Modified [place]  Toilet transfer to toilet with Modified [place].                       Time Tracking:     OT Date of Treatment: 01/17/24  OT Start Time: 1311  OT Stop Time: 1334  OT Total Time (min): 23 min    Billable Minutes:Self Care/Home Management 10  Therapeutic Activity 13    OT/DIONICIO: OT     Number of DIONICIO visits since last OT visit: 2    1/17/2024

## 2024-01-17 NOTE — NURSING
[facility],   Nurses Note -- 4 Eyes      1/16/2024       Skin assessed on: Q Shift      [x] No Pressure Injuries Present    []Prevention Measures Documented    [] Yes LDA  for Pressure Injury Previously documented     [] Yes New Pressure Injury Discovered   [] LDA for New Pressure Injury Added      Attending RN:  Soco Galvez RN     Second RN:  Claudette RN

## 2024-01-17 NOTE — PT/OT/SLP PROGRESS
Physical Therapy Co-Treatment    Patient Name:  Papito Bhakta   MRN:  [id number]  Admitting Diagnosis:  Serum total bilirubin elevated   Recent Surgery: Procedure(s) (LRB):  EGD (ESOPHAGOGASTRODUODENOSCOPY) (N/A) 14 Days Post-Op  Admit Date: 12/29/2023  Length of Stay: 15 days    Recommendations:     Discharge Recommendations:    Moderate Intensity Therapy   Discharge Equipment Recommendations: bedside commode, wheelchair, hospital bed, rolling walker  Barriers to discharge: Decreased caregiver support and increased need for caregiver assistance    Appropriate transfer level with nursing staff: Safe to transfer to bedside chair/bedside commode: stand pivot with 1 person with RW.    Plan:     During this hospitalization, patient to be seen 4 x/week to address the identified rehab impairments via gait training, therapeutic activities, therapeutic exercises, neuromuscular re-education and progress towards the established goals.  Plan of Care Expires:  02/07/24  Plan of Care Reviewed with: patient    Assessment:     Papito Bhakta is a 68 y.o. male admitted with a medical diagnosis of Serum total bilirubin elevated. Pt agreeable to therapy session at second attempt. Pt required decreased assistance with bed mobility and sit to stand transfers this session but continues to be limited by decreased endurance and decreased safety awareness limiting distance gait trained. Patient requires increased cueing for AD management and posture for safe utilization of RW. Patient would benefit from skilled therapy services to maximize safety and independence, increase activity tolerance, decrease fall risk, decrease caregiver burden, improve QOL, improve patient's functional mobility, and decrease risk of contractures and pressure sores. Patient continues to demonstrate the need for moderate intensity therapy on a daily basis post acute exhibited by decreased independence with functional mobility    DME Justifcation:    Patient has a  mobility limitation that significantly impairs their ability to participate in one or more mobility related activities of daily living, including toileting. This deficit can be resolved by using a bedside commode. Patient demonstrates mobility limitations that will cause them to be confined to one room at home, and a bathroom will not be accessible. Using a bedside commode will greatly improve the patient's ability to participate in MRADLs.    Patient requires a hospital bed for positioning of the body in ways that are not feasible with an ordinary bed. The patient requires special positioning for pain relief, limited mobility, and/or being unable to independently make changes in body position without the use of a hospital bed. Pillows and wedges will not be adequate for resolving these positional issues.     Patient has a mobility limitation that significantly impairs their ability to participate in one or more mobility related activities of daily living in customary locations in the home. The mobility limitation cannot be sufficiently resolved by the use of a cane or walker. The use of a manual wheelchair will greatly improve the patient's ability to participate in MRADLs. The patient will use the wheelchair on a regular basis at home. They have expressed their willingness to use a manual wheelchair in the home, and have a caregiver who is available and willing to assist with the wheelchair if needed.    Problem List: weakness, impaired endurance, impaired self care skills, impaired functional mobility, gait instability, impaired balance, decreased upper extremity function, decreased lower extremity function, decreased safety awareness, impaired cardiopulmonary response to activity.  Rehab Prognosis: Good; patient would benefit from acute skilled PT services to address these deficits and reach maximum level of function.      Goals:   Multidisciplinary Problems       Physical Therapy Goals          Problem:  "Physical Therapy    Goal Priority Disciplines Outcome Goal Variances Interventions   Physical Therapy Goal     PT, PT/OT Ongoing, Progressing     Description: Goals to met by 1/21/2024    1. Supine to sit with Stand-by Assistance  2. Sit to supine with Stand-by Assistance  3. Rolling to Left and Right with Stand-by Assistance.  4. Sit to stand transfer with Stand-by Assistance  5. Bed to chair transfer with Stand-by Assistance using Rolling Walker  6. Gait  x 75 feet with Stand-by Assistance using Rolling Walker   7. Ascend/Descend 6 inch curb step with Contact Guard Assistance using Rolling Walker.  8. Stand for 5 minutes with Stand-by Assistance using Rolling Walker  9. Lower extremity exercise program x15 reps per Instruction, with assistance as needed in order to facilitate improved postural control and improvement in functional independence- MET  Update: w/ independence                       Subjective     RN notified prior to session. No one present upon PT entrance into room. Patient agreeable to PT treatment session.    Chief Complaint: "Do you have 15 minutes so I can just stand here?"  Patient/Family Comments/goals: stand up longer  Pain/Comfort:  Pain Rating 1: 0/10  Pain Rating Post-Intervention 1: 0/10      Objective:     Additional staff present: OT; OT for cotx due to pt's multiple medical comorbidities and functional deficits req'ing two skilled therapists to appropriately maximize functional potential by progressing pt's musculoskeletal strength and endurance, facilitating neuromuscular postural balance and control ,and accommodating for patient's impaired cardiopulmonary tolerance to activities.     Patient found supine with: peripheral IV, telemetry   Cognition:   Alert, Distractible, and Cooperative  Patient is oriented to Person, Place, Time, Situation  General Precautions: Standard, Cardiac fall   Orthopedic Precautions:N/A   Braces: N/A   Body mass index is 25.41 kg/m².  Oxygen Device: Room " "Air  Vitals: BP 98/62   Pulse 80   Temp 97.8 °F (36.6 °C) (Oral)   Resp 16   Ht 6' 5" (1.956 m)   Wt 97.2 kg (214 lb 4.6 oz)   SpO2 99%   BMI 25.41 kg/m²     Outcome Measures:  AM-PAC 6 CLICK MOBILITY  Turning over in bed (including adjusting bedclothes, sheets and blankets)?: 3  Sitting down on and standing up from a chair with arms (e.g., wheelchair, bedside commode, etc.): 3  Moving from lying on back to sitting on the side of the bed?: 3  Moving to and from a bed to a chair (including a wheelchair)?: 3  Need to walk in hospital room?: 3  Climbing 3-5 steps with a railing?: 1  Basic Mobility Total Score: 16     Functional Mobility:    Bed Mobility:   Scooting with stand by assistance  Supine > Sit with stand by assistance    Transfers:   Sit <> Stand Transfer: minimum assistance with rolling walker     Balance:  Sitting Balance  Static: stand by assistance  Dynamic: stand by assistance  Standing:  Static: contact guard assistance for 5 minutes with RW  Pt requesting to stand for ~5 minutes to stretch out his legs. Pt with decreased safety awareness as he leaned B forearms onto RW for rest breaks x2 attempts during session despite max cueing to place hands on handgrips for safety.   Dynamic: contact guard assistance      Gait:  Patient ambulated: 12' in room   Patient required: contact guard  Patient used:  rolling walker   Gait Deviation(s): occasional unsteady gait, decreased step length, narrow base of support, flexed posture, and decreased roc  all lines remained intact throughout ambulation trial  Chair follow for patient safety  Comments: Patient with fwd lean onto RW and flexed B hips during gait trial requiring cueing for erect posture and fwd gaze.     Therapeutic exercise performed in the form of bed mobility, sitting balance, standing balance and transfers to increase patient's activity tolerance and postural control.     Education:  Time provided for education, counseling and discussion of " health disposition in regards to patient's current status  All questions answered within PT scope of practice and to patient's satisfaction  PT role in POC to address current functional deficits  Pt educated on proper body mechanics, safety techniques, and energy conservation with PT facilitation and cueing throughout session  Call nursing/pct to transfer to chair/use bathroom. Pt stated understanding.    Patient left up in chair with all lines intact, call button in reach, and RN and PCT notified.    Time Tracking:     PT Received On: 01/17/24  PT Start Time: 1311     PT Stop Time: 1334  PT Total Time (min): 23 min     Billable Minutes:   Gait Training 8 minutes and Therapeutic Exercise 15 minutes       PT/PTA: PT       1/17/2024

## 2024-01-17 NOTE — SUBJECTIVE & OBJECTIVE
Interval History: Noted some confusion overnight, fully oriented this morning.. On 2L O2 NC. 2.56L UOP yesterday. GINNA Licea continues to downtrend. No complaints this morning. NPO at midnight for EGD tomorrow.    Objective:     Vital Signs (Most Recent):  Temp: 98.2 °F (36.8 °C) (01/17/24 0758)  Pulse: 79 (01/17/24 0758)  Resp: 18 (01/17/24 0758)  BP: 98/65 (01/17/24 0758)  SpO2: 98 % (01/17/24 0758) Vital Signs (24h Range):  Temp:  [97.6 °F (36.4 °C)-98.2 °F (36.8 °C)] 98.2 °F (36.8 °C)  Pulse:  [71-84] 79  Resp:  [17-19] 18  SpO2:  [94 %-100 %] 98 %  BP: (91-98)/(54-65) 98/65     Weight: 98.6 kg (217 lb 6 oz)  Body mass index is 25.78 kg/m².    Intake/Output Summary (Last 24 hours) at 1/17/2024 0805  Last data filed at 1/17/2024 0523  Gross per 24 hour   Intake 422 ml   Output 2360 ml   Net -1938 ml         Physical Exam  Vitals and nursing note reviewed.   Constitutional:       Appearance: He is ill-appearing.   HENT:      Head: Normocephalic and atraumatic.      Mouth/Throat:      Mouth: Mucous membranes are dry.   Eyes:      General: Scleral icterus present.      Extraocular Movements: Extraocular movements intact.      Comments: Anisocoria   Neck:      Vascular: JVD (5cm) present.   Cardiovascular:      Rate and Rhythm: Normal rate and regular rhythm.      Heart sounds:      Gallop present.   Pulmonary:      Effort: Pulmonary effort is normal. No respiratory distress.      Breath sounds: Normal breath sounds. No wheezing.   Abdominal:      General: Abdomen is flat. There is no distension.      Palpations: Abdomen is soft.      Tenderness: There is no abdominal tenderness.   Musculoskeletal:         General: Tenderness (b/l LE) present.      Right lower leg: Edema present.      Left lower leg: Edema present.   Skin:     General: Skin is warm and dry.   Neurological:      General: No focal deficit present.      Mental Status: He is alert and oriented to person, place, and time.      Motor: Weakness present.    Psychiatric:         Mood and Affect: Mood normal.         Behavior: Behavior normal.             Significant Labs: All pertinent labs within the past 24 hours have been reviewed.    Significant Imaging: I have reviewed all pertinent imaging results/findings within the past 24 hours.

## 2024-01-17 NOTE — ASSESSMENT & PLAN NOTE
Creatinine 2.1 on admit, baseline around 1.4. Likely prerenal etiology given urine sodium and FENa vs progression of CKD. Improving with increased diuresis     Recent Labs     01/16/24  0350 01/16/24  1547 01/17/24  0636   BUN 41* 41* 38*   CREATININE 1.3 1.3 1.3         Estimated Creatinine Clearance: 68.5 mL/min (based on SCr of 1.3 mg/dL).  RESOLVED    - Renal US: renal cysts, no hydronephrosis  - Urine Na: 14, Pr/Cr: 0.72; FENa 0.2%  - Monitor urine output and serial BMP and adjust therapy as needed.   - Strict I&Os and daily weights   - Avoid nephrotoxic agents such as NSAIDs, gadolinium and IV radiocontrast.  - Renally dose meds to current GFR.  - Maintain MAP > 65.  - Nephrology referral outpatient

## 2024-01-17 NOTE — ASSESSMENT & PLAN NOTE
Reports a 3 month history of inability to tolerate solid foods. Notable vomiting immediatly with swallowing food/liquid. No complaints of nausea. SLP has assessed him, can tolerate liquids.     - Planning for EGD tomorrow, NPO @ midnight  - Continue liquid diet, Boost TID  - GI cancelled EGD and signing off. Will re-consult once patient is more euvolemic and hemodynamically stable.

## 2024-01-17 NOTE — ASSESSMENT & PLAN NOTE
Patient with Hypoxic Respiratory failure which is Acute.  he is not on home oxygen. Supplemental oxygen was provided and noted-      Patient with persistent O2 requirement of 2-3 LPM. Largest contribution likely due to fluid overload in setting of heart failure exacerbation and possible urinary retention. Also found to be COVID positive, s/p course of remdesivir. With his persistent hypoxia, possible that he is developing a post viral pneumonia, or an aspiration pneumonia in setting of dysphagia.   Troponin and repeat EKG WNL, concern for ACS low.   He does have baseline arrhythmia with PM in place, recently interrogated. No AS on recent TTE.    - Stopped lasix gtt, on bumex and  gtt  - Tunneled line placed 1/12/24 for dobutamine infusion  - Adding GDMT as tolerated  - Wean O2 as tolerated  - BNP repeated and still elevated at 2503.  - Consulting cardiology for failure to improve despite diuresis  - PT/OT consulted and following

## 2024-01-18 ENCOUNTER — ANESTHESIA (OUTPATIENT)
Dept: ENDOSCOPY | Facility: HOSPITAL | Age: 69
DRG: 291 | End: 2024-01-18
Payer: MEDICARE

## 2024-01-18 VITALS
HEART RATE: 80 BPM | SYSTOLIC BLOOD PRESSURE: 93 MMHG | OXYGEN SATURATION: 98 % | TEMPERATURE: 98 F | HEIGHT: 77 IN | WEIGHT: 214.31 LBS | RESPIRATION RATE: 18 BRPM | BODY MASS INDEX: 25.3 KG/M2 | DIASTOLIC BLOOD PRESSURE: 59 MMHG

## 2024-01-18 PROBLEM — I70.209 ATHEROSCLEROTIC PVD WITH ULCERATION: Status: ACTIVE | Noted: 2021-02-01

## 2024-01-18 PROBLEM — R06.03 ACUTE RESPIRATORY DISTRESS: Status: ACTIVE | Noted: 2019-01-05

## 2024-01-18 PROBLEM — L98.499 ATHEROSCLEROTIC PVD WITH ULCERATION: Status: ACTIVE | Noted: 2021-02-01

## 2024-01-18 LAB
ALBUMIN SERPL BCP-MCNC: 2 G/DL (ref 3.5–5.2)
ALP SERPL-CCNC: 94 U/L (ref 55–135)
ALT SERPL W/O P-5'-P-CCNC: 24 U/L (ref 10–44)
ANION GAP SERPL CALC-SCNC: 10 MMOL/L (ref 8–16)
ANION GAP SERPL CALC-SCNC: 13 MMOL/L (ref 8–16)
AST SERPL-CCNC: 26 U/L (ref 10–40)
BILIRUB SERPL-MCNC: 6.2 MG/DL (ref 0.1–1)
BUN SERPL-MCNC: 37 MG/DL (ref 8–23)
BUN SERPL-MCNC: 39 MG/DL (ref 8–23)
CALCIUM SERPL-MCNC: 9 MG/DL (ref 8.7–10.5)
CALCIUM SERPL-MCNC: 9.4 MG/DL (ref 8.7–10.5)
CHLORIDE SERPL-SCNC: 93 MMOL/L (ref 95–110)
CHLORIDE SERPL-SCNC: 95 MMOL/L (ref 95–110)
CO2 SERPL-SCNC: 29 MMOL/L (ref 23–29)
CO2 SERPL-SCNC: 30 MMOL/L (ref 23–29)
CREAT SERPL-MCNC: 1.3 MG/DL (ref 0.5–1.4)
CREAT SERPL-MCNC: 1.4 MG/DL (ref 0.5–1.4)
ERYTHROCYTE [DISTWIDTH] IN BLOOD BY AUTOMATED COUNT: 25.3 % (ref 11.5–14.5)
EST. GFR  (NO RACE VARIABLE): 54.7 ML/MIN/1.73 M^2
EST. GFR  (NO RACE VARIABLE): 59.8 ML/MIN/1.73 M^2
GLUCOSE SERPL-MCNC: 116 MG/DL (ref 70–110)
GLUCOSE SERPL-MCNC: 74 MG/DL (ref 70–110)
HCT VFR BLD AUTO: 34.5 % (ref 40–54)
HGB BLD-MCNC: 10.8 G/DL (ref 14–18)
MAGNESIUM SERPL-MCNC: 2.5 MG/DL (ref 1.6–2.6)
MCH RBC QN AUTO: 23.5 PG (ref 27–31)
MCHC RBC AUTO-ENTMCNC: 31.3 G/DL (ref 32–36)
MCV RBC AUTO: 75 FL (ref 82–98)
PHOSPHATE SERPL-MCNC: 3.1 MG/DL (ref 2.7–4.5)
PLATELET # BLD AUTO: 101 K/UL (ref 150–450)
PMV BLD AUTO: ABNORMAL FL (ref 9.2–12.9)
POCT GLUCOSE: 126 MG/DL (ref 70–110)
POTASSIUM SERPL-SCNC: 3.7 MMOL/L (ref 3.5–5.1)
POTASSIUM SERPL-SCNC: 4.3 MMOL/L (ref 3.5–5.1)
PROT SERPL-MCNC: 6.4 G/DL (ref 6–8.4)
RBC # BLD AUTO: 4.6 M/UL (ref 4.6–6.2)
SODIUM SERPL-SCNC: 135 MMOL/L (ref 136–145)
SODIUM SERPL-SCNC: 135 MMOL/L (ref 136–145)
WBC # BLD AUTO: 6.71 K/UL (ref 3.9–12.7)

## 2024-01-18 PROCEDURE — 94761 N-INVAS EAR/PLS OXIMETRY MLT: CPT

## 2024-01-18 PROCEDURE — D9220A PRA ANESTHESIA: Mod: ANES,,, | Performed by: ANESTHESIOLOGY

## 2024-01-18 PROCEDURE — 63600175 PHARM REV CODE 636 W HCPCS: Performed by: STUDENT IN AN ORGANIZED HEALTH CARE EDUCATION/TRAINING PROGRAM

## 2024-01-18 PROCEDURE — D9220A PRA ANESTHESIA: Mod: CRNA,,, | Performed by: STUDENT IN AN ORGANIZED HEALTH CARE EDUCATION/TRAINING PROGRAM

## 2024-01-18 PROCEDURE — 83735 ASSAY OF MAGNESIUM: CPT | Performed by: STUDENT IN AN ORGANIZED HEALTH CARE EDUCATION/TRAINING PROGRAM

## 2024-01-18 PROCEDURE — 43235 EGD DIAGNOSTIC BRUSH WASH: CPT | Mod: GC,,, | Performed by: STUDENT IN AN ORGANIZED HEALTH CARE EDUCATION/TRAINING PROGRAM

## 2024-01-18 PROCEDURE — 84100 ASSAY OF PHOSPHORUS: CPT | Performed by: STUDENT IN AN ORGANIZED HEALTH CARE EDUCATION/TRAINING PROGRAM

## 2024-01-18 PROCEDURE — 36415 COLL VENOUS BLD VENIPUNCTURE: CPT | Performed by: STUDENT IN AN ORGANIZED HEALTH CARE EDUCATION/TRAINING PROGRAM

## 2024-01-18 PROCEDURE — 25000003 PHARM REV CODE 250: Performed by: STUDENT IN AN ORGANIZED HEALTH CARE EDUCATION/TRAINING PROGRAM

## 2024-01-18 PROCEDURE — 43235 EGD DIAGNOSTIC BRUSH WASH: CPT | Performed by: STUDENT IN AN ORGANIZED HEALTH CARE EDUCATION/TRAINING PROGRAM

## 2024-01-18 PROCEDURE — 85027 COMPLETE CBC AUTOMATED: CPT | Performed by: STUDENT IN AN ORGANIZED HEALTH CARE EDUCATION/TRAINING PROGRAM

## 2024-01-18 PROCEDURE — 63600175 PHARM REV CODE 636 W HCPCS

## 2024-01-18 PROCEDURE — 37000009 HC ANESTHESIA EA ADD 15 MINS: Performed by: STUDENT IN AN ORGANIZED HEALTH CARE EDUCATION/TRAINING PROGRAM

## 2024-01-18 PROCEDURE — 25000242 PHARM REV CODE 250 ALT 637 W/ HCPCS

## 2024-01-18 PROCEDURE — 36415 COLL VENOUS BLD VENIPUNCTURE: CPT | Mod: XB

## 2024-01-18 PROCEDURE — 80053 COMPREHEN METABOLIC PANEL: CPT | Performed by: STUDENT IN AN ORGANIZED HEALTH CARE EDUCATION/TRAINING PROGRAM

## 2024-01-18 PROCEDURE — 37000008 HC ANESTHESIA 1ST 15 MINUTES: Performed by: STUDENT IN AN ORGANIZED HEALTH CARE EDUCATION/TRAINING PROGRAM

## 2024-01-18 PROCEDURE — 25000003 PHARM REV CODE 250

## 2024-01-18 PROCEDURE — 80048 BASIC METABOLIC PNL TOTAL CA: CPT | Mod: XB

## 2024-01-18 RX ORDER — HYDROMORPHONE HYDROCHLORIDE 1 MG/ML
0.2 INJECTION, SOLUTION INTRAMUSCULAR; INTRAVENOUS; SUBCUTANEOUS EVERY 5 MIN PRN
Status: DISCONTINUED | OUTPATIENT
Start: 2024-01-18 | End: 2024-01-18 | Stop reason: HOSPADM

## 2024-01-18 RX ORDER — ETOMIDATE 2 MG/ML
INJECTION INTRAVENOUS
Status: DISCONTINUED | OUTPATIENT
Start: 2024-01-18 | End: 2024-01-18

## 2024-01-18 RX ORDER — SPIRONOLACTONE 25 MG/1
50 TABLET ORAL DAILY
Qty: 45 TABLET | Refills: 5
Start: 2024-01-18

## 2024-01-18 RX ORDER — DEXMEDETOMIDINE HYDROCHLORIDE 100 UG/ML
INJECTION, SOLUTION INTRAVENOUS
Status: DISCONTINUED | OUTPATIENT
Start: 2024-01-18 | End: 2024-01-18

## 2024-01-18 RX ORDER — LORAZEPAM 2 MG/ML
0.25 INJECTION INTRAMUSCULAR ONCE AS NEEDED
Status: DISCONTINUED | OUTPATIENT
Start: 2024-01-18 | End: 2024-01-18 | Stop reason: HOSPADM

## 2024-01-18 RX ORDER — ONDANSETRON HYDROCHLORIDE 2 MG/ML
4 INJECTION, SOLUTION INTRAVENOUS ONCE AS NEEDED
Status: DISCONTINUED | OUTPATIENT
Start: 2024-01-18 | End: 2024-01-18 | Stop reason: HOSPADM

## 2024-01-18 RX ORDER — SODIUM CHLORIDE 9 MG/ML
INJECTION, SOLUTION INTRAVENOUS CONTINUOUS
Status: DISCONTINUED | OUTPATIENT
Start: 2024-01-18 | End: 2024-01-18 | Stop reason: HOSPADM

## 2024-01-18 RX ORDER — PROPOFOL 10 MG/ML
VIAL (ML) INTRAVENOUS CONTINUOUS PRN
Status: DISCONTINUED | OUTPATIENT
Start: 2024-01-18 | End: 2024-01-18

## 2024-01-18 RX ORDER — HALOPERIDOL 5 MG/ML
0.5 INJECTION INTRAMUSCULAR EVERY 10 MIN PRN
Status: DISCONTINUED | OUTPATIENT
Start: 2024-01-18 | End: 2024-01-18 | Stop reason: HOSPADM

## 2024-01-18 RX ORDER — SODIUM CHLORIDE 0.9 % (FLUSH) 0.9 %
3 SYRINGE (ML) INJECTION
Status: DISCONTINUED | OUTPATIENT
Start: 2024-01-18 | End: 2024-01-18 | Stop reason: HOSPADM

## 2024-01-18 RX ADMIN — ALUMINUM HYDROXIDE, MAGNESIUM HYDROXIDE, AND DIMETHICONE 10 ML: 400; 400; 40 SUSPENSION ORAL at 10:01

## 2024-01-18 RX ADMIN — HEPARIN SODIUM 5000 UNITS: 5000 INJECTION INTRAVENOUS; SUBCUTANEOUS at 05:01

## 2024-01-18 RX ADMIN — DEXMEDETOMIDINE 4 MCG: 100 INJECTION, SOLUTION, CONCENTRATE INTRAVENOUS at 08:01

## 2024-01-18 RX ADMIN — BUMETANIDE 2 MG: 1 TABLET ORAL at 04:01

## 2024-01-18 RX ADMIN — ETOMIDATE 4 MG: 2 INJECTION INTRAVENOUS at 08:01

## 2024-01-18 RX ADMIN — ASPIRIN 325 MG: 325 TABLET, COATED ORAL at 10:01

## 2024-01-18 RX ADMIN — SPIRONOLACTONE 50 MG: 25 TABLET ORAL at 10:01

## 2024-01-18 RX ADMIN — BUMETANIDE 2 MG: 1 TABLET ORAL at 10:01

## 2024-01-18 RX ADMIN — AMIODARONE HYDROCHLORIDE 200 MG: 200 TABLET ORAL at 10:01

## 2024-01-18 RX ADMIN — ALUMINUM HYDROXIDE, MAGNESIUM HYDROXIDE, AND DIMETHICONE 10 ML: 400; 400; 40 SUSPENSION ORAL at 04:01

## 2024-01-18 RX ADMIN — HEPARIN SODIUM 5000 UNITS: 5000 INJECTION INTRAVENOUS; SUBCUTANEOUS at 03:01

## 2024-01-18 RX ADMIN — PROPOFOL 20 MCG/KG/MIN: 10 INJECTION, EMULSION INTRAVENOUS at 08:01

## 2024-01-18 RX ADMIN — EMPAGLIFLOZIN 10 MG: 10 TABLET, FILM COATED ORAL at 10:01

## 2024-01-18 RX ADMIN — ALUMINUM HYDROXIDE, MAGNESIUM HYDROXIDE, AND DIMETHICONE 10 ML: 400; 400; 40 SUSPENSION ORAL at 01:01

## 2024-01-18 RX ADMIN — PRAVASTATIN SODIUM 80 MG: 40 TABLET ORAL at 10:01

## 2024-01-18 RX ADMIN — SODIUM CHLORIDE: 9 INJECTION, SOLUTION INTRAVENOUS at 08:01

## 2024-01-18 NOTE — ACP (ADVANCE CARE PLANNING)
Advance Care Planning     Date: 01/18/2024    Today a voluntary meeting took place: bedside    Patient Participation: Patient is able to participate     Attendees (Name and  Relationship to patient):  daughter    Staff attendees (Name and  Role): Xavier and MARIANNE.  SINTIA/Juliana DAY    ACP Conversation (General): Understanding of advance care planning and role of health care agent defined    Understanding of current condition needs a lot of home service that [employer] cannot provide and  agencies not accepting [employer] anyhow.  Patient does have a hospice qualifing diagnosis given his very advanced CHF on dobutamine drip    Code Status: Full Code     ACP Documents: Reviewed current existing digital forms    Goals of care: The patient endorses that what is most important right now is to focus on remaining as independent as possible and symptom/pain control    Accordingly, we have decided that the best plan to meet the patient's goals includes enrolling in hospice care and sue understands that he can revoke hospice care at anytime to pursue more invasive therapies if desired      Recommendations/  Follow-up tasks: Follow up family meeting planned: home hospice service       Length of ACP   conversation in minutes: 20 minutes

## 2024-01-18 NOTE — PLAN OF CARE
[facility] - Cardiology Stepdown  Discharge Reassessment    Primary Care Provider: Brynn To MD    Expected Discharge Date: 1/18/2024    Reassessment (most recent)       Discharge Reassessment - 01/18/24 1218          Discharge Reassessment    Assessment Type Discharge Planning Reassessment     Did the patient's condition or plan change since previous assessment? Yes     Discharge Plan discussed with: Patient;Adult children     Communicated MARIIA with patient/caregiver Yes     Discharge Plan A Home Health     Discharge Plan B Hospice/home     DME Needed Upon Discharge  hospital bed;walker, rolling;bedside commode     Transition of Care Barriers Mobility        Post-Acute Status    Post-Acute Authorization Hospice     Hospice Status Referrals Sent                 BARB, SINTIA Godfrey, and IM 2 team met with pt and daughter Annie at bedside to discuss discharge planning.  Discussed that pt will probably not be eligible for a bedside commode nor a hospital bed under [employer] and that we have been unable to find an accepting  provider, however pt would be eligible for all of the requested DME and can also get home nursing and therapy with hospice.  Annie voiced agreement for referral to be sent to [facility], who accepts pts on dobutamine.  Referral sent to [facility] of which liaison Dami was notified and stated that he would call Annie.    Discharge Plan A and Plan B have been determined by review of patient's clinical status, future medical and therapeutic needs, and coverage/benefits for post-acute care in coordination with multidisciplinary team members.      Juliana Barfield, ZEINAB  [facility]  [id number]

## 2024-01-18 NOTE — PHYSICIAN QUERY
PT Name: Papito Bhakta  MR #: [id number]     DOCUMENTATION CLARIFICATION     CDS/: Gela Thomas RN             Contact information: [email or web address]   This form is a permanent document in the medical record.     Query Date: January 18, 2024    By submitting this query, we are merely seeking further clarification of documentation.  Please utilize your independent clinical judgment when addressing the question(s) below.  The Medical Record contains the following   Indicators   Supporting Clinical Findings   Location in Medical Record   x Respiratory failure   Acute hypoxemic respiratory failure  Patient with Hypoxic Respiratory failure which is Acute.  he is not on home oxygen. Supplemental oxygen was provided and noted-      Patient with persistent O2 requirement of 2-3 LPM. Largest contribution likely due to fluid overload in setting of heart failure exacerbation and possible urinary retention. Also found to be COVID positive, s/p course of remdesivir. With his persistent  hypoxia, possible that he is developing a post viral pneumonia, or an aspiration pneumonia in setting of dysphagia.   PN 1/6       [facility]   x Subjective Respiratory Signs/Symptoms: SOB, KING, Cough, etc. Patient with increasing shortness of breath and chest tightness today. EKG and troponin ordered. Trop negative, EKG without changes from prior.      He had good oxygen saturation  96-98% on room air overnight and says his cough is improving.   PN 1/6       [facility]        PN 1/7      [facility]   x Objective Respiratory Signs/Symptoms: Respiratory distress, Accessory muscle use, tachypnea, wheezing, etc. Transient episode of increased respiratory distress 1/6. EKG without significant changes from prior. Troponin negative. CXR pending.    Breath sounds: Normal breath sounds.      Comments:    Increased work of breathing noted today  Accessory muscle use  2L NC  Lungs clear to auscultation      No accessory muscle use,  unlabored  unlabored  no use of accessory muscle     PN 1/6       [facility]      PN 1/6       [facility]              RN flow sheet  1/5 1/6   x RR         O2 sat         O2 use Resp:  [18-22] 20  SpO2:  [93 %-100 %] 93 %    Resp:  [18-20] 18  SpO2:  [91 %-98 %] 96 %    Oxygen   1L NC  Room Air  2L PN 1/6       [facility]    PN 1/7      [facility]    RN flow sheet  1/4- 1/6 1/7- 1/12 1/12    Blood Gas (ABG or VBG)      Hypoxia/Hypercapnia      BiPAP/Intubation/  Mechanical Ventilation      Home O2, Oxygen Dependence     x Radiology findings No significant detrimental change in cardiopulmonary status.  Continued bilateral coarsened interstitial attenuation/interstitial pulmonary edema.  No new focal consolidation.  No pneumothorax.  No significant pleural fluid.    CXR 1/6   x Acute/Chronic Illness NICM rEF 10-15%, ICD in place, mild-to-moderate mitral valve regurgitation, HTN, HLD, AMELIA, PVD, obesity, chronic lower extremity swelling with chronic recurring leg wounds  who presented to the ED with multiple complaints, incl leg swelling, fatigue, shortwindedness and RIGOBERTO.   PN 1/7       [facility]   x Treatment - Continue lasix gtt (see acute on chronic HF)  - F/u CXR  - Low threshold to add antibiotic coverage for PNA  - Wean O2 as tolerated  - PT/OT consulted, as deconditioning may also be contributing PN 1/6       [facility]    Other         The clinical guidelines noted are only a system guideline. It does not replace the providers clinical judgment.      [facility] Approved Diagnostic Criteria      Acute Respiratory Failure    Hypoxic: ABG pO2<60 mmHg or O2 sat of <91% on RA   AND/OR   Hypercapnic: ABG pCO2>50 mmHg with pH <7.35   AND   Respiratory symptoms documented (Subjective: SOB; Objective: Tachypnea, respiratory distress, increased work of breathing, unable to speak in complete sentences, labored breathing, use of accessory muscles, RR>26, cyanosis, dyspnea, wheezing, stridor, lethargy)      Chronic Respiratory  Failure   Hypoxic: Continuous home oxygen    AND/OR   Hypercapnic: Normal pH with high CO2 (ex. COPD)      Acute on Chronic Respiratory Failure   Hypoxic: ABG pO2>10mmHg below baseline OR ABG pO2<60 mmHg OR SpO2<91% on usual home O2  OR O2>2L/min over baseline home O2   AND/OR   Hypercapnic: ABG pCO2>50 mmHg OR pCO2>10mmHg over baseline and pH <7.35   AND   Respiratory symptoms documented      Acute Respiratory Distress Syndrome (ARDS) - an acute, diffuse, inflammatory form of lung injury. Suspect with progressive dyspnea, hypoxemic respiratory failure, and bilateral alveolar infiltrates on chest imaging within 6 to 72 hours of an inciting event.   Acute Respiratory Distress - Generally describes less severe respiratory symptoms (tachypnea, in respiratory distress, increased work of breathing, unable to speak in complete sentences, labored breathing, use of accessory muscles, RR> 24, cyanosis, dyspnea, wheezing, stridor, lethargy) without sufficient measurements (pO2, SpO2, pH, and pCO2) to meet criteria for respiratory failure)   Acute Respiratory Insufficiency - Generally describes less severe respiratory symptoms and measurements (pO2, SpO2, pH, and pCO2) not meeting criteria for respiratory failure         Due to conflicting clinical picture, please clarify the Respiratory Failure diagnosis or diagnoses associated with above clinical findings.     [ x   ] Acute Respiratory Failure with Hypoxia   Latest Reference Range & Units 12/30/23 15:22   POC PH 7.35 - 7.45  7.379   POC PCO2 35 - 45 mmHg 39.9   POC PO2 40 - 60 mmHg 23 (LL)   POC HCO3 24 - 28 mmol/L 23.5 (L)   (LL): Data is critically low  (L): Data is abnormally low    With SOB     [    ] Acute Respiratory Distress with Hypoxia     [    ] Other Respiratory Diagnosis (please specify): _________________         Please document in your progress notes daily for the duration of treatment until resolved and include in your discharge summary.      Reference:    PUSHPA Briones MD. (2020, March 13). Acute respiratory distress syndrome: Clinical features, diagnosis, and complications in adults ([id number] EMERSON Thompson MD & [id number] DAVIS Winn MD, Eds.). Retrieved November 13, 2020, from [email or web address]=search_[email or web address]=1~[email or web address]&usage_type=default&display_rank=1  Form No. 36055

## 2024-01-18 NOTE — PT/OT/SLP PROGRESS
Occupational Therapy      Patient Name:  Papito Bhakta   MRN:  [id number]    Patient not seen today secondary to MARLYN for EGD 1st attempt, pt up on BSC with hospital personnel and requesting therapy to return later on 2nd attempt, and pt refusal on 3rd attempt. Will follow-up as scheduled.    1/18/2024

## 2024-01-18 NOTE — PT/OT/SLP PROGRESS
Physical Therapy      Patient Name:  Papito Bhakta   MRN:  [id number]    Patient not seen today attempted to see pt x3 attempts today. 1st attempt 0834 pt MARLYN for EGD; 2nd attempt at 1254 pt in BR with hospital personnel in room requesting therapy come back; 3rd attempt 1338 pt refused session stating he was eating and has done too much today already. Will follow-up at next scheduled visit.

## 2024-01-18 NOTE — [employer]OVATION PATIENT INSTRUCTIONS
Discharge Summary/Instructions after an Endoscopic Procedure  Patient Name: Papito Kirkland  Patient MRN: [id number]  Patient YOB: 1955 Thursday, January 18, 2024  Viktor Norris MD  Dear patient,  As a result of recent federal legislation (The Federal Cures Act), you may   receive lab or pathology results from your procedure in your [facility]   account before your physician is able to contact you. Your physician or   their representative will relay the results to you with their   recommendations at their soonest availability.  Thank you,  RESTRICTIONS:  During your procedure today, you received medications for sedation.  These   medications may affect your judgment, balance and coordination.  Therefore,   for 24 hours, you have the following restrictions:   - DO NOT drive a car, operate machinery, make legal/financial decisions,   sign important papers or drink alcohol.    ACTIVITY:  Today: no heavy lifting, straining or running due to procedural   sedation/anesthesia.  The following day: return to full activity including work.  DIET:  Eat and drink normally unless instructed otherwise.     TREATMENT FOR COMMON SIDE EFFECTS:  - Mild abdominal pain, nausea, belching, bloating or excessive gas:  rest,   eat lightly and use a heating pad.  - Sore Throat: treat with throat lozenges and/or gargle with warm salt   water.  - Because air was used during the procedure, expelling large amounts of air   from your rectum or belching is normal.  - If a bowel prep was taken, you may not have a bowel movement for 1-3 days.    This is normal.  SYMPTOMS TO WATCH FOR AND REPORT TO YOUR PHYSICIAN:  1. Abdominal pain or bloating, other than gas cramps.  2. Chest pain.  3. Back pain.  4. Signs of infection such as: chills or fever occurring within 24 hours   after the procedure.  5. Rectal bleeding, which would show as bright red, maroon, or black stools.   (A tablespoon of blood from the rectum is not serious,  especially if   hemorrhoids are present.)  6. Vomiting.  7. Weakness or dizziness.  GO DIRECTLY TO THE NEAREST EMERGENCY ROOM IF YOU HAVE ANY OF THE FOLLOWING:      Difficulty breathing              Chills and/or fever over 101 F   Persistent vomiting and/or vomiting blood   Severe abdominal pain   Severe chest pain   Black, tarry stools   Bleeding- more than one tablespoon   Any other symptom or condition that you feel may need urgent attention  Your doctor recommends these additional instructions:  If any biopsies were taken, your doctors clinic will contact you in 1 to 2   weeks with any results.  - Return patient to hospital [facility] for ongoing care.   - Resume previous diet.   - Continue present medications.   - Return to GI clinic at appointment to be scheduled to be considered for   esohageal manometry study  For questions, problems or results please call your physician - Viktor Norris MD at Work:  [phone].  [facility] [place], EMERGENCY ROOM PHONE NUMBER: [phone]  IF A COMPLICATION OR EMERGENCY SITUATION ARISES AND YOU ARE UNABLE TO REACH   YOUR PHYSICIAN - GO DIRECTLY TO THE EMERGENCY ROOM.  Viktor Norris MD  1/18/2024 8:35:41 AM  This report has been verified and signed electronically.  Dear patient,  As a result of recent federal legislation (The Federal Cures Act), you may   receive lab or pathology results from your procedure in your [facility]   account before your physician is able to contact you. Your physician or   their representative will relay the results to you with their   recommendations at their soonest availability.  Thank you,  PROVATION

## 2024-01-18 NOTE — DISCHARGE SUMMARY
[facility] - Cardiology [facility] Medicine  Discharge Summary      Patient Name: Papito hBakta  MRN: [id number]  GLORIA: [id number]  Patient Class: IP- Inpatient  Admission Date: 12/29/2023  Hospital Length of Stay: 16 days  Discharge Date and Time:  01/18/2024 3:46 PM  Attending Physician: Reggie Park MD   Discharging Provider: Deepa Holly MD  Primary Care Provider: Brynn To MD  Hospital Medicine Team: [facility] HOSP MED 2 Deepa Holly MD  Primary Care Team: [facility] 2    HPI:   Papito Bhakta is a 68 y.o. male with NICM, combined CHF(11/2023 EF 10 -15%, G3DD) s/p ICD placement, CKD, HLD, HTN, and AMELIA who presents for bilateral lower extremity swelling and shortness of breath. Patient reports he has been having worsening shortness of breath for the past 6 months. He had acutely worsening SOB today where he described becoming profoundly dyspneic on exertion. He reported taking 2 of his 80 mg Lasix this morning with subsequent minimal urine output and minimal improvement in his SOB. He reports SOB aggravated with lying down and he requires frequent positional changes to keep comfortable. He reports additional poor appetite and urine output for the past week although he reports he has been drinking well. He had 2 episodes of nausea/vomiting this past week as well. He denies fever/chills, chest pain, abdominal pain, recent illness, medication changes. Patient reports adherence with all medications. He reports he lives with his daughter who helps him with his medications and healthcare.    Additionally he reports chronic leg pain from a chronic RLE wound. He went to wound care yesterday where dressings were changed and he was told they were healing well. He has bilateral lower extremity edema R>L that is typical for him and he denies recent worsening.    Procedure(s) (LRB):  EGD (ESOPHAGOGASTRODUODENOSCOPY) (N/A)      Hospital Course:   Pt admitted to hospital medicine for acute heart failure  exacerbation and inability to swallow solids for 3 months duration. Initiated on IV lasix. SLP evaluated the patient and determined that he could tolerate liquids. He had a new leukocytosis noted on 12/31, work up significant for COVID positive. He completed remdesivir, steroids held in order to prevent worsening fluid retention, and was able to be weaned to room oxygen by 1/4. Overnight 12/31, patient becane tachycardic and hypotensive (asymptomatic). Cardiology called and concluded that he was in atrial fibrillation with RVR and he was briefly changed from oral amiodarone to IV amiodarone for one day before resuming his oral dosing. PM interrogated, noted to be functioning accurately. Required transition to lasix gtt for continued hypervolemia. Noted to have chronic bilirubinemia, likely 2/2 congestive hepatopathy. RUQ US without biliary obstruction and hemolysis labs negative. Started on dobutamine gtt. After continued diuresis, was able to stop lasix gtt and transition to bumex. A tunneled line was placed for outpatient dobutamine infusion. EGD unremarkable, GI plans to continue dysphagia workup outpatient. Pt has been HDS for the last several days and has maintained euvolemia on current bumex dose. We are discharging him home on hospice with dobutamine gtt. Also has outpatient follow-up with GI, cardiology, and HFTCC.        Goals of Care Treatment Preferences:  Code Status: Full Code    Living Will: Yes  LaPOST: Yes  What is most important right now is to focus on remaining as independent as possible, symptom/pain control.  Accordingly, we have decided that the best plan to meet the patient's goals includes enrolling in hospice care, sue understands that he can revoke hospice care at anytime to pursue more invasive therapies if desired.    Vitals:    01/18/24 1528   BP: (!) 93/59   Pulse: 80   Resp: 18   Temp: 98.1 °F (36.7 °C)     Physical Exam  Vitals and nursing note reviewed.   Constitutional:        Appearance: He is ill-appearing.   HENT:      Head: Normocephalic and atraumatic.      Mouth/Throat:      Mouth: Mucous membranes are moist.   Eyes:      General: Scleral icterus present.      Extraocular Movements: Extraocular movements intact.      Comments: Anisocoria   Cardiovascular:      Rate and Rhythm: Normal rate and regular rhythm.      Heart sounds:      Gallop present.   Pulmonary:      Effort: Pulmonary effort is normal. No respiratory distress.      Breath sounds: Normal breath sounds. No wheezing.   Abdominal:      General: Abdomen is flat. There is no distension.      Palpations: Abdomen is soft.      Tenderness: There is no abdominal tenderness.   Musculoskeletal:         General: Tenderness (b/l LE) present.      Right lower leg: Edema (trace) present.      Left lower leg: Edema (trace) present.   Skin:     General: Skin is warm and dry.   Neurological:      General: No focal deficit present.      Mental Status: He is alert and oriented to person, place, and time.      Motor: Weakness present.   Psychiatric:         Mood and Affect: Mood normal.         Behavior: Behavior normal.        Consults:   Consults (From admission, onward)          Status Ordering Provider     Inpatient consult to Interventional Radiology  Once        Provider:  (Not yet assigned)    Completed MARKOS CHANDRA     Inpatient consult to Midline team  Once        Provider:  (Not yet assigned)    Completed FLOWER WALTERS     Inpatient consult to Midline team  Once        Provider:  (Not yet assigned)    Completed GINI VIDES     Inpatient consult to Palliative Care  Once        Provider:  (Not yet assigned)    Completed RAMYA PALACIOS     Inpatient consult to Cardiology  Once        Provider:  (Not yet assigned)    Completed MARKOS CHANDRA     Inpatient consult to Hepatology  Once        Provider:  (Not yet assigned)    Completed RAMYA PALACIOS     Inpatient consult to Electrophysiology  Once        Provider:   (Not yet assigned)    Completed RAMYA PALACIOS     Inpatient consult to Gastroenterology  Once        Provider:  (Not yet assigned)    Completed MARKOS CHANDRA            No new Assessment & Plan notes have been filed under this hospital service since the last note was generated.  Service: Hospital Medicine    Final Active Diagnoses:    Diagnosis Date Noted POA    PRINCIPAL PROBLEM:  Acute on chronic combined systolic and diastolic CHF (congestive heart failure) [I50.43] 11/10/2023 Yes    Encounter for palliative care [Z51.5] 01/10/2024 Not Applicable    ACP (advance care planning) [Z71.89] 01/10/2024 Not Applicable    COVID-19 [U07.1] 01/01/2024 Yes    Dysphagia [R13.10] 12/31/2023 Yes    Microcytic anemia [D50.9] 12/31/2023 Yes    Serum total bilirubin elevated [R17] 12/29/2023 Yes    Pupil asymmetry [H57.02] 12/29/2023 Yes    Acute renal failure superimposed on stage 3a chronic kidney disease [N17.9, N18.31] 11/02/2023 Yes    AMELIA (obstructive sleep apnea) [G47.33] 01/07/2022 Yes    Atherosclerotic PVD with ulceration [I70.209, L98.499] 02/01/2021 Yes    NICM (nonischemic cardiomyopathy) [I42.8] 06/16/2020 Yes     Chronic    Biventricular ICD (implantable cardioverter-defibrillator) in place [Z95.810] 05/14/2019 Yes    Hyperlipidemia [E78.5] 01/05/2019 Yes     Chronic    Acute respiratory failure with hypoxemia [J96.01] 01/05/2019 Yes    Essential hypertension [I10] 12/01/2017 Yes     Chronic      Problems Resolved During this Admission:       Discharged Condition: stable    Disposition: Hospice/Home    Follow Up:   Follow-up Information       [facility] - Cardiology - 3rd Fl. Call today.    Specialty: Cardiology  Contact information:  [address]  [facility] [address] [phone]  Additional information:  Cardiology Services Clinics - 3rd floor                         Patient Instructions:      HOSPITAL BED FOR HOME USE     Order Specific Question Answer Comments   Type: Semi-electric    Length  "of need (1-99 months): 6    Does patient have medical equipment at home? crutches, axillary    Does patient have medical equipment at home? rollator    Does patient have medical equipment at home? shower chair    Does patient have medical equipment at home? walker, rolling    Height: 6' 5" (1.956 m)    Weight: 97.2 kg (214 lb 4.6 oz)    Accessories: Gel overlay mattress    Please check all that apply: Patient requires positioning of the body in ways not feasible in an ordinary bed due to a medical condition which is expected to last at least one month.    Please check all that apply: Patient requires the head of bed to be elevated more than 30 degrees most of the time due to congestive heart failure, chronic pulmonary disease, or aspiration.  Pillows and wedges have been considered and ruled out.    Please check all that apply: Patient requires frequent changes in body position and/or has an immediate need for a change in body position.      COMMODE FOR HOME USE     Order Specific Question Answer Comments   Type: Standard    Height: 6' 5" (1.956 m)    Weight: 97.2 kg (214 lb 4.6 oz)    Does patient have medical equipment at home? crutches, axillary    Does patient have medical equipment at home? rollator    Does patient have medical equipment at home? shower chair    Does patient have medical equipment at home? walker, rolling    Length of need (1-99 months): 6      WALKER FOR HOME USE     Order Specific Question Answer Comments   Type of Walker: Adult (5'4"-6'6")    With wheels? Yes    Height: 6' 5" (1.956 m)    Weight: 97.2 kg (214 lb 4.6 oz)    Length of need (1-99 months): 6    Does patient have medical equipment at home? crutches, axillary    Does patient have medical equipment at home? rollator    Does patient have medical equipment at home? shower chair    Does patient have medical equipment at home? walker, rolling    Please check all that apply: Patient's condition impairs ambulation.    Please check all " that apply: Patient is unable to safely ambulate without equipment.    Please check all that apply: Walker will be used for gait training.      Ambulatory referral/consult to Gastroenterology   Standing Status: Future   Referral Priority: Routine Referral Type: Consultation   Referral Reason: Specialty Services Required   Requested Specialty: Gastroenterology   Number of Visits Requested: 1     Ambulatory referral/consult to [facility]   Standing Status: Future   Referral Priority: Routine Referral Type: Consultation   Referral Reason: Specialty Services Required   Requested Specialty: Cardiology   Number of Visits Requested: 1       Significant Diagnostic Studies: Labs: All labs within the past 24 hours have been reviewed    Pending Diagnostic Studies:       Procedure Component Value Units Date/Time    Basic metabolic panel [[id number]]     Order Status: Sent Lab Status: No result     Specimen: Blood     Basic metabolic panel [[id number]] Collected: 01/13/24 1953    Order Status: Sent Lab Status: In process Updated: 01/13/24 1953    Specimen: Blood            Medications:  Reconciled Home Medications:      Medication List        START taking these medications      bumetanide 2 MG tablet  Commonly known as: BUMEX  Take 1 tablet (2 mg total) by mouth 2 (two) times daily.     DOBUTamine 1,000 mg/250 mL (4,000 mcg/mL) infusion  Commonly known as: DOBUTREX  Inject 266.75 mcg/min into the vein continuous.     guaiFENesin 100 mg/5 ml 100 mg/5 mL syrup  Commonly known as: ROBITUSSIN  Take 10 mLs (200 mg total) by mouth every 4 (four) hours as needed for Cough or Congestion.     metOLazone 5 MG tablet  Commonly known as: ZAROXOLYN  Take 1 tablet (5 mg total) by mouth daily as needed (Weight gain of greater than 3 lbs in one day or 5 lbs in 3 days).     senna-docusate 8.6-50 mg 8.6-50 mg per tablet  Commonly known as: PERICOLACE  Take 1 tablet by mouth daily as needed for Constipation.             CHANGE how you take these medications      spironolactone 25 MG tablet  Commonly known as: ALDACTONE  Take 2 tablets (50 mg total) by mouth once daily. HOLD IF SYSTOLIC BLOOD PRESSURE IS LESS THAN 110  What changed:   how much to take  how to take this  when to take this  additional instructions            CONTINUE taking these medications      amiodarone 200 MG Tab  Commonly known as: PACERONE  Take 1 tablet (200 mg total) by mouth once daily.     aspirin 325 MG EC tablet  Commonly known as: ECOTRIN  Take 1 tablet (325 mg total) by mouth once daily.     JARDIANCE 10 mg tablet  Generic drug: empagliflozin  Take 1 tablet (10 mg total) by mouth once daily.     metoprolol succinate 50 MG 24 hr tablet  Commonly known as: TOPROL-XL  TAKE 1 TABLET EVERY DAY     pravastatin 80 MG tablet  Commonly known as: PRAVACHOL  TAKE 1 TABLET EVERY DAY            STOP taking these medications      furosemide 80 MG tablet  Commonly known as: LASIX     potassium chloride 20 mEq  Commonly known as: K-TAB              Indwelling Lines/Drains at time of discharge:   Lines/Drains/Airways       Central Venous Catheter Line  Duration             Tunneled Central Line Insertion/Assessment - Double Lumen  01/12/24 1707 Internal Jugular Left 5 days              Drain  Duration             Female External Urinary Catheter w/ Suction 01/10/24 8 days                    Time spent on the discharge of patient: 45 minutes         Deepa Holly MD  Department of Hospital Medicine  [facility] - Cardiology Stepdown

## 2024-01-18 NOTE — PLAN OF CARE
[facility]  Department of Hospital Medicine  [address] [place] [address]  [phone] after hours  [phone] fax    HOSPICE  ORDERS    01/18/2024    Admit to Hospice:  Home Service    Diagnoses:   Active Hospital Problems    Diagnosis  POA    *Acute on chronic combined systolic and diastolic CHF (congestive heart failure) [I50.43]  Yes    Encounter for palliative care [Z51.5]  Not Applicable    ACP (advance care planning) [Z71.89]  Not Applicable    COVID-19 [U07.1]  Yes    Dysphagia [R13.10]  Yes    Microcytic anemia [D50.9]  Yes    Serum total bilirubin elevated [R17]  Yes    Pupil asymmetry [H57.02]  Yes    Acute renal failure superimposed on stage 3a chronic kidney disease [N17.9, N18.31]  Yes    AMELIA (obstructive sleep apnea) [G47.33]  Yes     The patient symptomatically has snoring, frequent awakening with findings of chf. This warrants further investigation for possible obstructive sleep apnea.  Patient declined at this time.  Per patient, prior sleep study at [facility] was normal.  Advise patient that his risk for amelia changes with aging and weight gain.  Aware of cardiovascular morbidities/mortality a/w untreated amelia.  Patient will contact patient should he changes his mind.         Peripheral vascular disease, unspecified [I73.9]  Yes    NICM (nonischemic cardiomyopathy) [I42.8]  Yes     Chronic     EF 10%.  S/p ppm and currently on amiodarone for vt.  Follow by Dr. Mckenzie      Biventricular ICD (implantable cardioverter-defibrillator) in place [Z95.810]  Yes    Hyperlipidemia [E78.5]  Yes     Chronic    Acute hypoxemic respiratory failure [J96.01]  Yes    Essential hypertension [I10]  Yes     Chronic      Resolved Hospital Problems   No resolved problems to display.       Hospice Qualifying Diagnoses: HFrEF s/p ICD       Patient has a life expectancy < 6 months due to:  Primary Hospice Diagnosis:  HFrEF s/p ICD  Comorbid Conditions Contributing to Decline:  CKD3, HTN, AMELIA  Vital Signs: Routine per Hospice Protocol.    Code Status: Full    Allergies:   Review of patient's allergies indicates:   Allergen Reactions    Ramipril      Leg swelling and gynecomastia     Losartan Rash       Diet: Cardiac Diet     Activities: As tolerated    Goals of Care Treatment Preferences:  Code Status: Full Code    Living Will: Yes  LaPOST: Yes  What is most important right now is to focus on spending time at home, improvement in condition but with limits to invasive therapies.  Accordingly, we have decided that the best plan to meet the patient's goals includes continuing with treatment.      Nursing: Per Hospice Routine.     Routine Skin for Bedridden Patients: Apply moisture barrier cream to all skin folds and   wet areas in perineal area daily and after baths and all bowel movements.    PICC Care:   Scrub the Hub: Prior to accessing the line, always perform a 30 second alcohol scrub  Each lumen of the central line is to be flushed at least daily with 10 mL Normal Saline and 3 mL Heparin flush (100 units/mL)  Skilled Nurse (SN) may draw blood from IV access  Date of removal: N/A    Oxygen: N/A    Other Miscellaneous Care: PT/OT  Physical Therapy Three times weekly and Occupational Therapy Three times weekly    Medications:      Medication List        START taking these medications      bumetanide 2 MG tablet  Commonly known as: BUMEX  Take 1 tablet (2 mg total) by mouth 2 (two) times daily.     DOBUTamine 1,000 mg/250 mL (4,000 mcg/mL) infusion  Commonly known as: DOBUTREX  Inject 266.75 mcg/min into the vein continuous.     guaiFENesin 100 mg/5 ml 100 mg/5 mL syrup  Commonly known as: ROBITUSSIN  Take 10 mLs (200 mg total) by mouth every 4 (four) hours as needed for Cough or Congestion.     metOLazone 5 MG tablet  Commonly known as: ZAROXOLYN  Take 1 tablet (5 mg total) by mouth daily as needed (Weight gain of greater than 3 lbs in one day or 5 lbs in 3 days).     senna-docusate 8.6-50  mg 8.6-50 mg per tablet  Commonly known as: PERICOLACE  Take 1 tablet by mouth daily as needed for Constipation.            CHANGE how you take these medications      spironolactone 25 MG tablet  Commonly known as: ALDACTONE  Take 2 tablets (50 mg total) by mouth once daily. HOLD IF SYSTOLIC BLOOD PRESSURE IS LESS THAN 110  What changed:   how much to take  how to take this  when to take this  additional instructions            CONTINUE taking these medications      amiodarone 200 MG Tab  Commonly known as: PACERONE  Take 1 tablet (200 mg total) by mouth once daily.     aspirin 325 MG EC tablet  Commonly known as: ECOTRIN  Take 1 tablet (325 mg total) by mouth once daily.     JARDIANCE 10 mg tablet  Generic drug: empagliflozin  Take 1 tablet (10 mg total) by mouth once daily.     metoprolol succinate 50 MG 24 hr tablet  Commonly known as: TOPROL-XL  TAKE 1 TABLET EVERY DAY     pravastatin 80 MG tablet  Commonly known as: PRAVACHOL  TAKE 1 TABLET EVERY DAY            STOP taking these medications      furosemide 80 MG tablet  Commonly known as: LASIX     potassium chloride 20 mEq  Commonly known as: K-TAB                DIABETES CARE: N/A    Future Orders:  Hospice Medical Director may dictate new orders for comfortable care measures & sign death certificate.        _________________________________  Deepa Holly MD  01/18/2024

## 2024-01-18 NOTE — PLAN OF CARE
[facility] - Cardiology Stepdown  Discharge Final Note    Primary Care Provider: Brynn To MD    Expected Discharge Date: 1/18/2024    Final Discharge Note (most recent)       Final Note - 01/18/24 1621          Final Note    Assessment Type Final Discharge Note     Anticipated Discharge Disposition Hospice/Home        Post-Acute Status    Post-Acute Authorization Hospice;IV Infusion     Hospice Status Set-up Complete/Auth obtained     [facility] Infusion Status Set-up Complete/Auth obtained                   Per Jessica with [facility] they are set and DME will be delivered to the home, although pt does not need to wait for the DME to be delivered to discharge.  Per Veena with [facility] she is on her way to the bedside to hook up pt's home dobutamine.  RN Keyshawn and pt's daughter Annie updated.  SW confirmed with Annie at bedside that she will be driving pt home.      Juliana Barfield LMSW  [facility]  [id number]    Contact Info       [facility] - Cardiology - 3rd Fl   Specialty: Cardiology    [address]  [facility] [address]   Phone: [phone]       Next Steps: Call today

## 2024-01-18 NOTE — TREATMENT PLAN
GI Post Procedure Treatment Plan  Procedure Performed: EGD    Impression:    - Normal esophagus.                          - Normal stomach.                          - Normal examined duodenum.                          - No specimens collected.     Recommendation:                                 - Return patient to hospital forte for ongoing care.                          - Resume previous diet.                          - Continue present medications.                          - Return to GI clinic at appointment to be                          scheduled to be considered for esohageal manometry                          study    - We will sign-off.    Katherine Bravo MD  Gastroenterology, PGY-4

## 2024-01-18 NOTE — PLAN OF CARE
01/18/24 1006   Post-Acute Status   Post-Acute Authorization [facility]   Home Health Status Referrals Sent     Heather called from [facility] ([id number]) and stated their next available date is jan. 26th, 2024. Additional referral sent via care port to :   [facility] .     10:25 - spoke with Leia at [facility] and she stated :  [employer] [facility] [place] WE ARE NOT IN NETWORK WITH THIS PLAN AT THIS TIME. THANK YOU FOR THINKING OF US  (so they are unable to accept )    11:13 called and spoke with Antonietta ([phone] )at [facility] and she stated they are unable to accept patient .    Epifanio Licona RN CM   [phone]

## 2024-01-18 NOTE — PLAN OF CARE
Patient has been declined by all of the listed home health agencies :  [facility] stated unable to [facility] the [facility] .   [facility] -[employer] gold plus [employer] and heart HMO CSNP) we are not in network with this plan.   [facility]  Unable to initiate timely care at this time.  [facility] - We dont see IV dobutamine patients.  [facility] Unfortunately we are at our quota at this time.  [facility] -Unable to meet needs .  [facility] -we are out of network with his [employer] plan.  [facility]- patient extremely too high a risk for us to take care of him and he is more suitable for hospice .  [facility]-not able to accept patient at this time due to short staffing and no one to go to his area.   [facility]- unable to accept patient .  [place]- We are not in network with this plan ([employer] [facility] (HMO CSNP)  [facility] not able at this time next available date is January 26 , 2024.  [facility] - awaiting result   [facility] - awaiting result.    Epifanio Licona RN    [phone]

## 2024-01-18 NOTE — PLAN OF CARE
01/18/24 1329   Post-Acute Status   Post-Acute Authorization Hospice;IV Infusion   Home Health Status Discharge Plan Changed   Hospice Status Pending equipment/medication delivery   IV Infusion Status Referral(s) sent     Per Jessica with [facility] she met with family and they are in agreement with hospice.  Infusion referral sent to [facility] per the contact they have with [facility].  Liaison Veena notified of referral.  [facility] notified of change in discharge plan.       Juliana Barfield, ZEINAB  [facility]  [id number]

## 2024-01-18 NOTE — NURSING TRANSFER
Nursing Transfer Note      1/18/2024   9:41 AM    Reason patient is being transferred: Recovery criteria met, returning to IP room    PACU nurse giving handoff: LAUREN Muller    Nurse receiving handoff: LAUREN Mahajan    Transfer to 330    Transfer via stretcher    Transfer with cardiac monitoring    Transported by Patient Escort/Transport    Telemetry: Box # [id number] HR 75 V-Paced  Verified on & working by PACU RN: Yes    Transfer Vital Signs @ 0935  Temperature: 97.5  Blood Pressure: 102/69  Heart Rate: 75   O2 Sat:95% on RA  Respirations: 23    Medicines/Equipment sent with patient: Dobutamine infusing 4ml/hr per pump    Any special needs or follow-up needed: See GI notes/recs    Patient belongings transferred with patient: No    Chart send with patient: Yes    Notified: Patient declined    Patient reassessed prior to transfer at: 1/18/24 @ 0940

## 2024-01-18 NOTE — H&P
Short Stay Endoscopy History and Physical    PCP - Brynn To MD  Referring Physician - Cayetano Somers MD  [address] [place] [address]    Procedure - Endoscopy  ASA - per anesthesia  Mallampati - per anesthesia  History of Anesthesia problems - no  Family history Anesthesia problems -  no   Plan of anesthesia - General    HPI  68 y.o. male  Reason for procedure:   dysphagia    ROS:  Constitutional: No fevers, chills, No weight loss  CV: No chest pain  Pulm: No cough, No shortness of breath  GI: see HPI    Medical History:  has a past medical history of RIGOBERTO (acute kidney injury) (10/7/2020), Anticoagulant long-term use, CHF (congestive heart failure), Hyperlipidemia, Hypertension, Microcytic anemia (12/31/2023), NICM (nonischemic cardiomyopathy) (6/16/2020), Obesity, AMELIA (obstructive sleep apnea) (1/7/2022), and Peripheral vascular disease, unspecified (2/1/2021).    Surgical History:  has a past surgical history that includes Testicle surgery; oral extraction (11/2018); Insertion of biventricular implantable cardioverter-defibrillator (ICD) (N/A, 02/13/2019); Insertion of biventricular implantable cardioverter-defibrillator (ICD) (N/A, 09/28/2020); Esophagogastroduodenoscopy (N/A, 1/3/2024); and Esophagogastroduodenoscopy (N/A, 1/5/2024).    Family History: family history includes Epilepsy in his mother..    Social History:  reports that he quit smoking about 10 years ago. His smoking use included cigarettes and cigars. He started smoking about 50 years ago. He has a 20.0 pack-year smoking history. He has been exposed to tobacco smoke. He has never used smokeless tobacco. He reports current alcohol use. He reports that he does not use drugs.    Review of patient's allergies indicates:   Allergen Reactions    Ramipril      Leg swelling and gynecomastia     Losartan Rash       Medications:   Medications Prior to Admission   Medication Sig Dispense Refill Last Dose    amiodarone (PACERONE) 200  MG Tab Take 1 tablet (200 mg total) by mouth once daily. 90 tablet 3 12/29/2023    metoprolol succinate (TOPROL-XL) 50 MG 24 hr tablet TAKE 1 TABLET EVERY DAY 90 tablet 3 12/29/2023    pravastatin (PRAVACHOL) 80 MG tablet TAKE 1 TABLET EVERY DAY 90 tablet 1 12/29/2023    [DISCONTINUED] furosemide (LASIX) 80 MG tablet TAKE 1 TABLET EVERY DAY 90 tablet 1 12/29/2023    [DISCONTINUED] potassium chloride (K-TAB) 20 mEq TAKE 1 TABLET EVERY DAY 90 tablet 1 12/29/2023    [DISCONTINUED] spironolactone (ALDACTONE) 25 MG tablet TAKE 1/2 TABLET (12.5 MG TOTAL) ONCE DAILY. HOLD IF SYSTOLIC BLOOD PRESSURE IS LESS THAN 110 45 tablet 5 12/29/2023    aspirin (ECOTRIN) 325 MG EC tablet Take 1 tablet (325 mg total) by mouth once daily. 90 tablet 3     empagliflozin (JARDIANCE) 10 mg tablet Take 1 tablet (10 mg total) by mouth once daily. 90 tablet 3        Physical Exam:    Vital Signs:   Vitals:    01/18/24 0728   BP: 107/61   Pulse: 77   Resp: 18   Temp: 97.7 °F (36.5 °C)       General Appearance: Well appearing in no acute distress  Abdomen: Soft, non tender, non distended with normal bowel sounds, no masses    Labs:  Lab Results   Component Value Date    WBC 6.71 01/18/2024    HGB 10.8 (L) 01/18/2024    HCT 34.5 (L) 01/18/2024     (L) 01/18/2024    CHOL 103 (L) 09/14/2023    TRIG 51 09/14/2023    HDL 35 (L) 09/14/2023    ALT 24 01/18/2024    AST 26 01/18/2024     (L) 01/18/2024    K 3.7 01/18/2024    CL 95 01/18/2024    CREATININE 1.3 01/18/2024    BUN 37 (H) 01/18/2024    CO2 30 (H) 01/18/2024    TSH 2.764 09/14/2023    INR 1.2 12/06/2023    HGBA1C 5.8 (H) 11/10/2023       I have explained the risks and benefits of this endoscopic procedure to the patient including but not limited to bleeding, inflammation, infection, perforation, and death.      Ezio Glasgow MD

## 2024-01-18 NOTE — TRANSFER OF CARE
"Anesthesia Transfer of Care Note    Patient: Papito Bhakta    Procedure(s) Performed: Procedure(s) (LRB):  EGD (ESOPHAGOGASTRODUODENOSCOPY) (N/A)    Patient location: PACU    Anesthesia Type: general    Transport from OR: Transported from OR on 2-3 L/min O2 by NC with adequate spontaneous ventilation    Post pain: adequate analgesia    Post assessment: no apparent anesthetic complications    Post vital signs: stable    Level of consciousness: responds to stimulation    Nausea/Vomiting: no nausea/vomiting    Complications: none    Transfer of care protocol was followed      Last vitals: Visit Vitals  BP (!) 107/61 (BP Location: Left arm, Patient Position: Lying)   Pulse 76   Temp 36.3 °C (97.3 °F) (Temporal)   Resp 18   Ht 6' 5" (1.956 m)   Wt 97.2 kg (214 lb 4.6 oz)   SpO2 (!) 100%   BMI 25.41 kg/m²     "

## 2024-01-19 ENCOUNTER — OUTPATIENT CASE MANAGEMENT (OUTPATIENT)
Dept: ADMINISTRATIVE | Facility: OTHER | Age: 69
End: 2024-01-19
Payer: MEDICARE

## 2024-01-19 NOTE — PROGRESS NOTES
Outpatient Care Management  Patient Not Qualified    Patient: Papito Bhakta  MRN:  [id number]  Date of Service:  1/19/2024  Completed by:  Shannon Enriquez RN    Chief Complaint   Patient presents with    OPCM Chart Review    OPCM Enrollment Call    Case Closure       Patient Summary           Reason Not Qualified:  Other (see comment)    Per chart review pt discharged with hospice

## 2024-01-19 NOTE — ANESTHESIA POSTPROCEDURE EVALUATION
Anesthesia Post Evaluation    Patient: Papito Bhakta    Procedure(s) Performed: Procedure(s) (LRB):  EGD (ESOPHAGOGASTRODUODENOSCOPY) (N/A)    Final Anesthesia Type: general      Patient location during evaluation: PACU  Patient participation: Yes- Able to Participate  Level of consciousness: awake and alert and oriented  Post-procedure vital signs: reviewed and stable  Pain management: adequate  Airway patency: patent    PONV status at discharge: No PONV  Anesthetic complications: no      Cardiovascular status: hemodynamically stable  Respiratory status: unassisted, spontaneous ventilation and room air  Hydration status: euvolemic  Follow-up not needed.          Vital signs stable, no issues at this time.    Event Time   Out of Recovery 09:42:17         Pain/Shira Score: Shira Score: 10 (1/18/2024  9:35 AM)

## 2024-01-22 ENCOUNTER — TELEPHONE (OUTPATIENT)
Dept: CARDIOLOGY | Facility: CLINIC | Age: 69
End: 2024-01-22
Payer: MEDICARE

## 2024-01-22 NOTE — TELEPHONE ENCOUNTER
"[facility]    Attempted to call pt to complete 24-72hour post discharge "check in" call. Unable to reach pt at listed phone numbers.  Was able to leave message on voicemail [facility]..     Will continue to try to reach patient.    "

## 2024-01-26 ENCOUNTER — TELEPHONE (OUTPATIENT)
Dept: CARDIOLOGY | Facility: CLINIC | Age: 69
End: 2024-01-26

## 2024-01-26 DIAGNOSIS — I50.42 CHRONIC COMBINED SYSTOLIC AND DIASTOLIC CONGESTIVE HEART FAILURE: Primary | ICD-10-CM

## 2024-01-26 NOTE — TELEPHONE ENCOUNTER
Call to PT re missed appt today @ 11:30 and no return call from our message to check on PT when he left the Hospital. Daughter Annie informs me that her Father is on Hospice at Home. I apologize for the intrusion as I did not know and was concerned that he missed his appt.    Returned call @ 3:30 to advise Annie that a referral to AHF/HTS has been placed. Annie says PT is bed bound now but she will speak to him and if he wants to be seen in the HF/HTS Clinic she will get him here.

## 2024-01-30 ENCOUNTER — TELEPHONE (OUTPATIENT)
Dept: TRANSPLANT | Facility: CLINIC | Age: 69
End: 2024-01-30
Payer: MEDICARE

## 2024-02-19 ENCOUNTER — DOCUMENTATION ONLY (OUTPATIENT)
Dept: CARDIOLOGY | Facility: CLINIC | Age: 69
End: 2024-02-19
Payer: MEDICARE

## 2024-02-19 ENCOUNTER — CLINICAL SUPPORT (OUTPATIENT)
Dept: CARDIOLOGY | Facility: HOSPITAL | Age: 69
End: 2024-02-19
Payer: MEDICARE

## 2024-02-19 ENCOUNTER — CLINICAL SUPPORT (OUTPATIENT)
Dept: CARDIOLOGY | Facility: HOSPITAL | Age: 69
End: 2024-02-19
Attending: INTERNAL MEDICINE
Payer: MEDICARE

## 2024-02-19 ENCOUNTER — TELEPHONE (OUTPATIENT)
Dept: ELECTROPHYSIOLOGY | Facility: CLINIC | Age: 69
End: 2024-02-19
Payer: MEDICARE

## 2024-02-19 DIAGNOSIS — I50.9 HEART FAILURE, UNSPECIFIED: ICD-10-CM

## 2024-02-19 NOTE — TELEPHONE ENCOUNTER
Following Provider: Dr. Kwong    Pt discharged on 1/18/2024 on hospice.  Pt/family decided  to keep tachy therapies on for now, hospice plans to give family a magnet.  Instructed daughter to call with any questions or concerns.    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    Most recent BP: 93/59    No hx of VT ablation  Notable medications/anti-arrhythmics: Amiodarone 200 mg QD and Toprol-XL 50 mg QD  Last EF and date:11/12/23 10 - 15%    Last clinic visit: 8/1/2023  Next clinic visit:2/29/24

## 2024-02-19 NOTE — PROGRESS NOTES
Per conversation with HTS coordinator pt is receiving hospice care. Closing episode and dis enrolling.

## 2024-02-21 ENCOUNTER — TELEPHONE (OUTPATIENT)
Dept: ELECTROPHYSIOLOGY | Facility: CLINIC | Age: 69
End: 2024-02-21
Payer: MEDICARE

## 2024-02-21 NOTE — TELEPHONE ENCOUNTER
- Recent GIB on last admission with GI recommending 8 week course of Protonix s/p EGD with nonbleeding gastric ulcer. PPI end date 02/23/24 and follow up EGD planned at that time. Continue PPI while inpatient.    ----- Message from Chacha Sood RN sent at 2/21/2024  1:50 PM CST -----  Contact: Annie- [phone] daughter  Pt on hospice per chart  ----- Message -----  From: Reggie Mosquera MA  Sent: 2/21/2024   1:40 PM CST  To: Chacha Sood RN      ----- Message -----  From: Steph Rodríguez MA  Sent: 2/21/2024  12:22 PM CST  To: Elenita SEBASTIAN Staff     She is calling about her father who has a defib and want it cut off so it doesn't shock him. Please call Annie.

## 2024-02-21 NOTE — TELEPHONE ENCOUNTER
Returned the daughter's call.  Pt is now under Hospice care with [facility] and she asked about turning pt's ICD OFF (disable Tachy Therapy).  Informed Annie that the Hospice doctor would have to place and order and then contact [facility] who will send a Rep to the pt's home to disable therapies.  The daughter then stated the Hospice Nurse told her they have a magnet that can be used.  Informed her that is correct a magnet secured of the ICD will prevent therapy from being delivered.  Daughter then stated she will call [facility] as it relates to using a magnet.  Understanding was verbalized.  The daughter appreciated the call.

## 2024-02-22 ENCOUNTER — TELEPHONE (OUTPATIENT)
Dept: ELECTROPHYSIOLOGY | Facility: CLINIC | Age: 69
End: 2024-02-22
Payer: MEDICARE

## 2024-02-22 ENCOUNTER — CLINICAL SUPPORT (OUTPATIENT)
Dept: CARDIOLOGY | Facility: HOSPITAL | Age: 69
End: 2024-02-22
Attending: INTERNAL MEDICINE
Payer: MEDICARE

## 2024-02-22 ENCOUNTER — CLINICAL SUPPORT (OUTPATIENT)
Dept: CARDIOLOGY | Facility: HOSPITAL | Age: 69
End: 2024-02-22
Payer: MEDICARE

## 2024-02-22 DIAGNOSIS — I50.9 HEART FAILURE, UNSPECIFIED: ICD-10-CM

## 2024-02-22 NOTE — TELEPHONE ENCOUNTER
Remote alert received from Merlin for MMVT with ATP and shock on 2/21/2024 @ 1014.    Pt is currently in Hospice and his daughter Annie contacted our clinic to discuss disabling Tachytherapies. (See note from PUSHPA Tong 2/21/24 with plan.)       His daughter Annie will contact Hospice nurse and doctor with plan for Magnet application and order to disable tachy therapies for a rep from [employer] to perform.

## 2024-02-23 ENCOUNTER — TELEPHONE (OUTPATIENT)
Dept: CARDIOLOGY | Facility: HOSPITAL | Age: 69
End: 2024-02-23
Payer: MEDICARE

## 2024-02-23 NOTE — TELEPHONE ENCOUNTER
Received a call from Betzaida with [facility] regarding turning off tachy therapy as requested by patient and family.  Explained an order from the hospice physician is needed. Provided number to [facility] to coordinate.  Patient remains connected to remote home monitoring.  It would be appropriate to suspend home monitoring since patient is in hospice and wants therapy turned off so hospice nurse will discuss with patient and family.

## 2024-02-25 LAB
OHS CV AF BURDEN PERCENT: < 1
OHS CV AF BURDEN PERCENT: < 1
OHS CV BIV PACING PERCENT: 90 %
OHS CV BIV PACING PERCENT: 90 %
OHS CV DC REMOTE DEVICE TYPE: NORMAL
OHS CV DC REMOTE DEVICE TYPE: NORMAL
OHS CV ICD SHOCK: YES
OHS CV ICD SHOCK: YES

## 2024-02-26 ENCOUNTER — TELEPHONE (OUTPATIENT)
Dept: ELECTROPHYSIOLOGY | Facility: CLINIC | Age: 69
End: 2024-02-26
Payer: MEDICARE

## 2024-04-01 PROBLEM — N17.9 ACUTE RENAL FAILURE SUPERIMPOSED ON STAGE 3A CHRONIC KIDNEY DISEASE: Status: RESOLVED | Noted: 2023-11-02 | Resolved: 2024-04-01

## 2024-04-01 PROBLEM — N18.31 ACUTE RENAL FAILURE SUPERIMPOSED ON STAGE 3A CHRONIC KIDNEY DISEASE: Status: RESOLVED | Noted: 2023-11-02 | Resolved: 2024-04-01

## 2024-04-22 PROBLEM — J96.01 ACUTE RESPIRATORY FAILURE WITH HYPOXEMIA: Status: RESOLVED | Noted: 2019-01-05 | Resolved: 2024-04-22

## 2025-05-06 NOTE — ASSESSMENT & PLAN NOTE
Presents with intermittent nausea with vomiting for 2 weeks. TBili elevated at 4.9 on admit, direct predominance. With elevated INR and low plts.   - active vomiting on 11/11 AM with trying to eat grits. Improving on 11/12. Now resolved  - advance diet   - PPI IV BID  - PRN antiemetics     Confirm at time of implantation:

## 2025-07-17 NOTE — PROGRESS NOTES
Nutr F/u note re: TF initiation.  TF ordered yesterday per Pulm NP.  Pt likely to remain intubated today per RN as pt failed SBT this AM.  I spoke to Dr. Gray & recommended changing TF to Impact Peptide 1.5 as this would be more appropriate for this pt. He agreed & order changed w/ his approval.  See RD's consult note from yesterday for full nutr eval/assessment/recs.   RD to continue to f/u per protocol.    [Hyperlipidemia] : hyperlipidemia [Coronary Artery Disease] : coronary artery disease

## (undated) DEVICE — SLING SWATHE UNIVERSAL FOAM

## (undated) DEVICE — KIT BRIDGE ACCESSORY

## (undated) DEVICE — PAD DEFIB CADENCE ADULT R2

## (undated) DEVICE — GUIDEWIRE STD .035X180CM STR

## (undated) DEVICE — Device

## (undated) DEVICE — SHEATH SAFESHEATH 9FRX25CM

## (undated) DEVICE — PACK PACER PERMANENT

## (undated) DEVICE — CONVERTORS PACEMAKER SHEET

## (undated) DEVICE — CATH BLLN ATTAIN VENOGRAM

## (undated) DEVICE — SHEATH SAFE ULTRA 9FR

## (undated) DEVICE — INTRODUCER OPTISEAL 9F 13CM

## (undated) DEVICE — KIT PROBE COVER WITH GEL

## (undated) DEVICE — KIT LEAD LOCKING DEVICE ACC

## (undated) DEVICE — ELECTRODE REM PLYHSV RETURN 9

## (undated) DEVICE — ARROW BALLOON WEDGE PRESSURE CATHETER

## (undated) DEVICE — WIRE GUIDE WHOLEY MOD J .035

## (undated) DEVICE — SUT 2-0 VICRYL / SH (J417)

## (undated) DEVICE — ELECTRODE EKG QUICK COMBO

## (undated) DEVICE — ADHESIVE DERMABOND ADVANCED

## (undated) DEVICE — DRESSING TRANS 4X4 TEGADERM

## (undated) DEVICE — INTRODUCER OPTISEAL 7F 13CM

## (undated) DEVICE — SUT SILK 0 SH 30IN BLK BR

## (undated) DEVICE — GUIDEWIRE STD .035X180CM ANG

## (undated) DEVICE — WRENCH PM PINCH 6.5MM LEAD

## (undated) DEVICE — INTRODUCER HEMOSTASIS 7.5F

## (undated) DEVICE — SHEATH TIGHTRAL SUB-C 11FR

## (undated) DEVICE — SHEATH INTRODUCER 9FR 11CM

## (undated) DEVICE — OMNIPAQUE 300MG 50ML

## (undated) DEVICE — STYLET KIT 52CM

## (undated) DEVICE — CATH ELECTRD DECAPOLAR 6F

## (undated) DEVICE — NDL PERCUTANEOUS ENTRYBSDN 18

## (undated) DEVICE — WIRE GUIDE HI TRQ BAL

## (undated) DEVICE — GUIDEWIRE SAFE T J TIP 145X2.5

## (undated) DEVICE — DRESSING AQUACEL AG 3.5X10IN

## (undated) DEVICE — BLADE PLASMA WIDE SPATULA TIP

## (undated) DEVICE — SEE MEDLINE ITEM 154981

## (undated) DEVICE — PACK PM MEADOWCREST CUSTOM

## (undated) DEVICE — STYLET 52CM

## (undated) DEVICE — SHEATH SAFESHEATH II ULTRA 6FR

## (undated) DEVICE — KIT WRENCH

## (undated) DEVICE — VALVE CONTROL COPILOT

## (undated) DEVICE — CATH MPA2 INFINITI 4FR 100CM

## (undated) DEVICE — CUTTER LEAD

## (undated) DEVICE — DRAPE C-ARM FOR MOBILE XRAY

## (undated) DEVICE — CATH DXTERITY AL-I 100CM 6FR

## (undated) DEVICE — STYLET 65CM

## (undated) DEVICE — HAND CONTROL ELECTRODE PENCIL

## (undated) DEVICE — DEVICE LEAD EXTRACTION 1 65CM

## (undated) DEVICE — CABLE PACING ALLGTR CLIP 12

## (undated) DEVICE — SAFESHEATH WORLEY 9FR RIGHT

## (undated) DEVICE — INTRODUCER SAFESHEATH II 8FR

## (undated) DEVICE — DRAPE INCISE IOBAN 2 23X17IN

## (undated) DEVICE — SUT 4/0 18IN COATED VICRYL

## (undated) DEVICE — SAFESHEATH II 8FR 13CM

## (undated) DEVICE — LINE PRESSURE MONITORING 96IN

## (undated) DEVICE — WIRE WHISPER MS 014 X 190

## (undated) DEVICE — SAFESHEATH II LONG 6FR 23CM